# Patient Record
Sex: FEMALE | Race: WHITE | Employment: OTHER | URBAN - METROPOLITAN AREA
[De-identification: names, ages, dates, MRNs, and addresses within clinical notes are randomized per-mention and may not be internally consistent; named-entity substitution may affect disease eponyms.]

---

## 2017-01-14 ENCOUNTER — HOSPITAL ENCOUNTER (OUTPATIENT)
Dept: MAMMOGRAPHY | Age: 67
Discharge: HOME | End: 2017-01-14
Attending: FAMILY MEDICINE
Payer: MEDICARE

## 2017-01-14 DIAGNOSIS — Z12.31 ENCOUNTER FOR SCREENING MAMMOGRAM FOR MALIGNANT NEOPLASM OF BREAST: ICD-10-CM

## 2017-01-14 PROCEDURE — 77067 SCR MAMMO BI INCL CAD: CPT

## 2017-01-25 ENCOUNTER — HOSPITAL ENCOUNTER (OUTPATIENT)
Dept: MAMMOGRAPHY | Age: 67
Discharge: HOME | End: 2017-01-25
Attending: FAMILY MEDICINE
Payer: MEDICARE

## 2017-01-25 DIAGNOSIS — R92.8 ABNORMAL SCREENING MAMMOGRAM: ICD-10-CM

## 2017-01-25 PROCEDURE — 76642 ULTRASOUND BREAST LIMITED: CPT

## 2017-01-25 PROCEDURE — 77065 DX MAMMO INCL CAD UNI: CPT

## 2017-04-26 ENCOUNTER — APPOINTMENT (OUTPATIENT)
Dept: GENERAL RADIOLOGY | Age: 67
End: 2017-04-26
Attending: EMERGENCY MEDICINE
Payer: MEDICARE

## 2017-04-26 ENCOUNTER — HOSPITAL ENCOUNTER (EMERGENCY)
Age: 67
Discharge: HOME | End: 2017-04-26
Attending: EMERGENCY MEDICINE
Payer: MEDICARE

## 2017-04-26 VITALS
TEMPERATURE: 98 F | WEIGHT: 130 LBS | RESPIRATION RATE: 15 BRPM | OXYGEN SATURATION: 96 % | BODY MASS INDEX: 20.4 KG/M2 | HEART RATE: 70 BPM | HEIGHT: 67 IN | SYSTOLIC BLOOD PRESSURE: 128 MMHG | DIASTOLIC BLOOD PRESSURE: 72 MMHG

## 2017-04-26 DIAGNOSIS — R07.89 ATYPICAL CHEST PAIN: Primary | ICD-10-CM

## 2017-04-26 LAB
ALBUMIN SERPL BCP-MCNC: 3.5 G/DL (ref 3.2–4.6)
ALBUMIN/GLOB SERPL: 0.8 {RATIO} (ref 1.2–3.5)
ALP SERPL-CCNC: 82 U/L (ref 50–136)
ALT SERPL-CCNC: 24 U/L (ref 12–65)
ANION GAP BLD CALC-SCNC: 10 MMOL/L (ref 7–16)
AST SERPL W P-5'-P-CCNC: 18 U/L (ref 15–37)
ATRIAL RATE: 68 BPM
ATRIAL RATE: 88 BPM
BILIRUB SERPL-MCNC: 0.5 MG/DL (ref 0.2–1.1)
BUN SERPL-MCNC: 8 MG/DL (ref 8–23)
CALCIUM SERPL-MCNC: 8.6 MG/DL (ref 8.3–10.4)
CALCULATED P AXIS, ECG09: 73 DEGREES
CALCULATED P AXIS, ECG09: 91 DEGREES
CALCULATED R AXIS, ECG10: 78 DEGREES
CALCULATED R AXIS, ECG10: 79 DEGREES
CALCULATED T AXIS, ECG11: -3 DEGREES
CALCULATED T AXIS, ECG11: 8 DEGREES
CHLORIDE SERPL-SCNC: 102 MMOL/L (ref 98–107)
CO2 SERPL-SCNC: 28 MMOL/L (ref 21–32)
CREAT SERPL-MCNC: 0.77 MG/DL (ref 0.6–1)
DIAGNOSIS, 93000: NORMAL
DIAGNOSIS, 93000: NORMAL
ERYTHROCYTE [DISTWIDTH] IN BLOOD BY AUTOMATED COUNT: 14.5 % (ref 11.9–14.6)
GLOBULIN SER CALC-MCNC: 4.5 G/DL (ref 2.3–3.5)
GLUCOSE SERPL-MCNC: 130 MG/DL (ref 65–100)
HCT VFR BLD AUTO: 41.9 % (ref 35.8–46.3)
HGB BLD-MCNC: 14 G/DL (ref 11.7–15.4)
MCH RBC QN AUTO: 29.7 PG (ref 26.1–32.9)
MCHC RBC AUTO-ENTMCNC: 33.4 G/DL (ref 31.4–35)
MCV RBC AUTO: 88.8 FL (ref 79.6–97.8)
P-R INTERVAL, ECG05: 156 MS
P-R INTERVAL, ECG05: 160 MS
PLATELET # BLD AUTO: 269 K/UL (ref 150–450)
PMV BLD AUTO: 10.4 FL (ref 10.8–14.1)
POTASSIUM SERPL-SCNC: 3.5 MMOL/L (ref 3.5–5.1)
PROT SERPL-MCNC: 8 G/DL (ref 6.3–8.2)
Q-T INTERVAL, ECG07: 334 MS
Q-T INTERVAL, ECG07: 386 MS
QRS DURATION, ECG06: 78 MS
QRS DURATION, ECG06: 82 MS
QTC CALCULATION (BEZET), ECG08: 404 MS
QTC CALCULATION (BEZET), ECG08: 410 MS
RBC # BLD AUTO: 4.72 M/UL (ref 4.05–5.25)
SODIUM SERPL-SCNC: 140 MMOL/L (ref 136–145)
TROPONIN I BLD-MCNC: 0 NG/ML (ref 0–0.08)
TROPONIN I BLD-MCNC: 0 NG/ML (ref 0–0.08)
VENTRICULAR RATE, ECG03: 68 BPM
VENTRICULAR RATE, ECG03: 88 BPM
WBC # BLD AUTO: 6.8 K/UL (ref 4.3–11.1)

## 2017-04-26 PROCEDURE — 71010 XR CHEST PORT: CPT

## 2017-04-26 PROCEDURE — 99285 EMERGENCY DEPT VISIT HI MDM: CPT | Performed by: EMERGENCY MEDICINE

## 2017-04-26 PROCEDURE — 84484 ASSAY OF TROPONIN QUANT: CPT

## 2017-04-26 PROCEDURE — 74011250637 HC RX REV CODE- 250/637: Performed by: EMERGENCY MEDICINE

## 2017-04-26 PROCEDURE — 93005 ELECTROCARDIOGRAM TRACING: CPT | Performed by: EMERGENCY MEDICINE

## 2017-04-26 PROCEDURE — 85027 COMPLETE CBC AUTOMATED: CPT | Performed by: EMERGENCY MEDICINE

## 2017-04-26 PROCEDURE — 80053 COMPREHEN METABOLIC PANEL: CPT | Performed by: EMERGENCY MEDICINE

## 2017-04-26 RX ORDER — PREDNISONE 20 MG/1
40 TABLET ORAL DAILY
Qty: 8 TAB | Refills: 0 | Status: SHIPPED | OUTPATIENT
Start: 2017-04-26 | End: 2017-04-30

## 2017-04-26 RX ORDER — NITROGLYCERIN 0.4 MG/1
0.4 TABLET SUBLINGUAL
Status: DISCONTINUED | OUTPATIENT
Start: 2017-04-26 | End: 2017-04-26 | Stop reason: HOSPADM

## 2017-04-26 RX ORDER — GUAIFENESIN 100 MG/5ML
324 LIQUID (ML) ORAL
Status: COMPLETED | OUTPATIENT
Start: 2017-04-26 | End: 2017-04-26

## 2017-04-26 RX ADMIN — NITROGLYCERIN 0.4 MG: 0.4 TABLET SUBLINGUAL at 10:10

## 2017-04-26 RX ADMIN — ASPIRIN 81 MG 324 MG: 81 TABLET ORAL at 10:10

## 2017-04-26 NOTE — DISCHARGE INSTRUCTIONS
Chest Pain: Care Instructions  Your Care Instructions  There are many things that can cause chest pain. Some are not serious and will get better on their own in a few days. But some kinds of chest pain need more testing and treatment. Your doctor may have recommended a follow-up visit in the next 8 to 12 hours. If you are not getting better, you may need more tests or treatment. Even though your doctor has released you, you still need to watch for any problems. The doctor carefully checked you, but sometimes problems can develop later. If you have new symptoms or if your symptoms do not get better, get medical care right away. If you have worse or different chest pain or pressure that lasts more than 5 minutes or you passed out (lost consciousness), call 911 or seek other emergency help right away. A medical visit is only one step in your treatment. Even if you feel better, you still need to do what your doctor recommends, such as going to all suggested follow-up appointments and taking medicines exactly as directed. This will help you recover and help prevent future problems. How can you care for yourself at home? · Rest until you feel better. · Take your medicine exactly as prescribed. Call your doctor if you think you are having a problem with your medicine. · Do not drive after taking a prescription pain medicine. When should you call for help? Call 911 if:  · You passed out (lost consciousness). · You have severe difficulty breathing. · You have symptoms of a heart attack. These may include:  ¨ Chest pain or pressure, or a strange feeling in your chest.  ¨ Sweating. ¨ Shortness of breath. ¨ Nausea or vomiting. ¨ Pain, pressure, or a strange feeling in your back, neck, jaw, or upper belly or in one or both shoulders or arms. ¨ Lightheadedness or sudden weakness. ¨ A fast or irregular heartbeat.   After you call 911, the  may tell you to chew 1 adult-strength or 2 to 4 low-dose aspirin. Wait for an ambulance. Do not try to drive yourself. Call your doctor today if:  · You have any trouble breathing. · Your chest pain gets worse. · You are dizzy or lightheaded, or you feel like you may faint. · You are not getting better as expected. · You are having new or different chest pain. Where can you learn more? Go to http://tae-edel.info/. Enter A120 in the search box to learn more about \"Chest Pain: Care Instructions. \"  Current as of: May 27, 2016  Content Version: 11.2  © 2719-7335 [employer]. Care instructions adapted under license by [employer] (which disclaims liability or warranty for this information). If you have questions about a medical condition or this instruction, always ask your healthcare professional. [address] any warranty or liability for your use of this information. Pleurisy: Care Instructions  Your Care Instructions  Pleurisy is inflammation of the tissue that lines the inside of the chest and covers the lungs (pleura). Pleurisy is often caused by an infection, usually a virus. It also can be caused by other health problems, such as pneumonia or lupus. Pleurisy can cause sharp chest pain that gets worse when you cough or take a deep breath. You may need more tests to find out what is causing your pleurisy. Treatment depends on the cause. Pleurisy may come and go for a few days, or it may continue if the cause has not been treated. Home treatment can help ease symptoms. Follow-up care is a key part of your treatment and safety. Be sure to make and go to all appointments, and call your doctor if you are having problems. Its also a good idea to know your test results and keep a list of the medicines you take. How can you care for yourself at home? · Take an over-the-counter pain medicine, such as acetaminophen (Tylenol), ibuprofen (Advil, Motrin), or naproxen (Aleve).  Read and follow all instructions on the label. · Do not take two or more pain medicines at the same time unless the doctor told you to. Many pain medicines have acetaminophen, which is Tylenol. Too much acetaminophen (Tylenol) can be harmful. · If your doctor prescribed antibiotics, take them as directed. Do not stop taking them just because you feel better. You need to take the full course of antibiotics. · Take cough medicine as directed if your doctor recommends it. · Avoid activities that make the pain worse. When should you call for help? Call 911 anytime you think you may need emergency care. For example, call if:  · You have severe trouble breathing. · You have severe chest pain. · You passed out (lost consciousness). Call your doctor now or seek immediate medical care if:  · You have a new or higher fever. Watch closely for changes in your health, and be sure to contact your doctor if:  · You begin to cough up yellow or green mucus. · You cough up blood. · Your symptoms are not better in 3 or 4 days. Where can you learn more? Go to http://tae-edel.info/. Enter F346 in the search box to learn more about \"Pleurisy: Care Instructions. \"  Current as of: May 23, 2016  Content Version: 11.2  © 1258-1062 [employer]. Care instructions adapted under license by [employer] (which disclaims liability or warranty for this information). If you have questions about a medical condition or this instruction, always ask your healthcare professional. [place] [address] any warranty or liability for your use of this information.     Recent Results (from the past 8 hour(s))   POC TROPONIN-I    Collection Time: 04/26/17  9:15 AM   Result Value Ref Range    Troponin-I (POC) 0 0.0 - 0.08 ng/ml   CBC W/O DIFF    Collection Time: 04/26/17  9:18 AM   Result Value Ref Range    WBC 6.8 4.3 - 11.1 K/uL    RBC 4.72 4.05 - 5.25 M/uL    HGB 14.0 11.7 - 15.4 g/dL    HCT 41.9 35.8 - 46.3 %    MCV 88.8 79.6 - 97.8 FL    MCH 29.7 26.1 - 32.9 PG    MCHC 33.4 31.4 - 35.0 g/dL    RDW 14.5 11.9 - 14.6 %    PLATELET 034 762 - 733 K/uL    MPV 10.4 (L) 10.8 - 57.6 FL   METABOLIC PANEL, COMPREHENSIVE    Collection Time: 04/26/17  9:18 AM   Result Value Ref Range    Sodium 140 136 - 145 mmol/L    Potassium 3.5 3.5 - 5.1 mmol/L    Chloride 102 98 - 107 mmol/L    CO2 28 21 - 32 mmol/L    Anion gap 10 7 - 16 mmol/L    Glucose 130 (H) 65 - 100 mg/dL    BUN 8 8 - 23 MG/DL    Creatinine 0.77 0.6 - 1.0 MG/DL    GFR est AA >60 >60 ml/min/1.73m2    GFR est non-AA >60 >60 ml/min/1.73m2    Calcium 8.6 8.3 - 10.4 MG/DL    Bilirubin, total 0.5 0.2 - 1.1 MG/DL    ALT (SGPT) 24 12 - 65 U/L    AST (SGOT) 18 15 - 37 U/L    Alk. phosphatase 82 50 - 136 U/L    Protein, total 8.0 6.3 - 8.2 g/dL    Albumin 3.5 3.2 - 4.6 g/dL    Globulin 4.5 (H) 2.3 - 3.5 g/dL    A-G Ratio 0.8 (L) 1.2 - 3.5     EKG, 12 LEAD, INITIAL    Collection Time: 04/26/17  9:18 AM   Result Value Ref Range    Ventricular Rate 88 BPM    Atrial Rate 88 BPM    P-R Interval 156 ms    QRS Duration 78 ms    Q-T Interval 334 ms    QTC Calculation (Bezet) 404 ms    Calculated P Axis 91 degrees    Calculated R Axis 79 degrees    Calculated T Axis 8 degrees    Diagnosis       !! AGE AND GENDER SPECIFIC ECG ANALYSIS !!   Normal sinus rhythm  Biatrial enlargement  Cannot rule out Anterior infarct (cited on or before 19-AUG-2013)  ST & T wave abnormality, consider inferior ischemia  Abnormal ECG  When compared with ECG of 27-DEC-2013 03:59,  Premature supraventricular complexes are no longer Present  T wave inversion more evident in Anterior leads     POC TROPONIN-I    Collection Time: 04/26/17 10:55 AM   Result Value Ref Range    Troponin-I (POC) 0 0.0 - 0.08 ng/ml

## 2017-04-26 NOTE — ED PROVIDER NOTES
HPI     CDNotesTM Templates                            Emergency Department     Chief Complaint:  Chest pain  HPI:  41-year-old female with history of coronary disease status post stents presents to the emergency department with substernal chest pain. Patient states it feels like a goose egg in her chest.  Worse with deep breath. She also complains of pain in her jaws. She also reports that her left arm \"feels funny\"  Chest pain started yesterday, worse this morning  The location of the pain is substernal  Pain is described as a dull pain like something sitting in her chest  Severity of pain is moderate  Timing of onset of symptoms was gradual  Associated symptoms include none  Aggravating factors include none  Alleviating factors include none  Historian:     Review of Systems:  Include pertinent positives and negatives.     CONST:  Denies: fever, chills  ENT:  Denies: sore throat, nasal congestion  EYES:  Denies: vision changes  CARDIO: Chest pain, no palpitations  RESP:  No shortness of breath, no cough,  GI:  Denies: abdominal pain, nausea, vomiting, diarrhea  :  Denies: urinary symptoms  MUSC: Denies: muscle pain, joint pain  SKIN:  Denies: rash  NEURO: Denies: headache, weakness  Past Medical History:  Past Medical History:   Diagnosis Date    CAD (coronary artery disease) 2009, 8/19/2013    PCI,  Shabnam Snyder COPD     Dr. Mari Perez GERD (gastroesophageal reflux disease)     [facility] failure ([facility] [address]     Hypertension     STEMI (ST elevation myocardial infarction) ([facility] [address]     Thyroid disease      Past Surgical History:   Procedure Laterality Date    CARDIAC SURG PROCEDURE UNLIST  2009, 8/19/13    LAD    HX GYN      rebuilt cervix    HX TONSIL AND ADENOIDECTOMY       Social History   Substance Use Topics    Smoking status: Former Smoker     Packs/day: 0.25     Years: 45.00     Types: Cigarettes     Quit date: 8/19/2013    Smokeless tobacco: Never Used    Alcohol use 1.8 oz/week     1 Glasses of wine, 1 Cans of beer, 1 Shots of liquor per week      Comment: rare     Family History   Problem Relation Age of Onset    Heart Attack Father      Adoptive father    No Known Problems Mother      adopted     Previous Medications    ASPIRIN DELAYED-RELEASE 81 MG TABLET    Take  by mouth daily. ATORVASTATIN (LIPITOR) 80 MG TABLET        BUDESONIDE-FORMOTEROL (SYMBICORT) 160-4.5 MCG/ACTUATION HFA INHALER    Take 2 Puffs by inhalation two (2) times a day. CARVEDILOL (COREG) 3.125 MG TABLET    Take 1 Tab by mouth two (2) times daily (with meals). CETIRIZINE (ZYRTEC) 10 MG TABLET    Take  by mouth. CLOPIDOGREL (PLAVIX) 75 MG TABLET    Take 1 Tab by mouth daily. GUAIFENESIN ER (MUCINEX) 600 MG ER TABLET    Take 600 mg by mouth two (2) times a day. HYDROCORTISONE BUTYRATE (LOCOID) 0.1 % TOPICAL CREAM    Apply  to affected area two (2) times a day. LISINOPRIL (PRINIVIL, ZESTRIL) 2.5 MG TABLET    Take 1 Tab by mouth daily. MULTIVITAMIN (ONE A DAY) TABLET    Take 1 Tab by mouth daily. NITROGLYCERIN (NITROSTAT) 0.4 MG SL TABLET    1 Tab by SubLINGual route every five (5) minutes as needed for Chest Pain. PANTOPRAZOLE (PROTONIX) 40 MG TABLET    Take 1 Tab by mouth two (2) times a day. PROAIR HFA 90 MCG/ACTUATION INHALER    USE 2 PUFFS BY MOUTH EVERY 4 HOURS AS NEEDED FOR WHEEZING    TIOTROPIUM (SPIRIVA WITH HANDIHALER) 18 MCG INHALATION CAPSULE    Take 1 Cap by inhalation daily. Allergies as of 04/26/2017    (No Known Allergies)       Physical Exam:    Vital signs:     Vital signs were reviewed.     Pulse oximetry interpretation: 96% normal    General Appear: alert, no distress  Ears/Nose/Throat: mucous membranes moist, pharynx clear  Eyes:   PERRL, anicteric  Neck:   supple, FROM, no JVD  Cardiovascular: Regular without murmurs  Respiratory:  No wheezing rales or rhonchi  Abdomen:  soft, non-tender, non-distended, normo-active bowel sounds  Musculoskeletal: no edema  Skin:   dry, no rash  Neurologic:  alert, oriented, tongue and facial movements intact  _______________________________________________________________________    LABS/RADIOLOGY/EKG:    Labs:     Results for orders placed or performed during the hospital encounter of 04/26/17   CBC W/O DIFF   Result Value Ref Range    WBC 6.8 4.3 - 11.1 K/uL    RBC 4.72 4.05 - 5.25 M/uL    HGB 14.0 11.7 - 15.4 g/dL    HCT 41.9 35.8 - 46.3 %    MCV 88.8 79.6 - 97.8 FL    MCH 29.7 26.1 - 32.9 PG    MCHC 33.4 31.4 - 35.0 g/dL    RDW 14.5 11.9 - 14.6 %    PLATELET 681 942 - 727 K/uL    MPV 10.4 (L) 10.8 - 78.6 FL   METABOLIC PANEL, COMPREHENSIVE   Result Value Ref Range    Sodium 140 136 - 145 mmol/L    Potassium 3.5 3.5 - 5.1 mmol/L    Chloride 102 98 - 107 mmol/L    CO2 28 21 - 32 mmol/L    Anion gap 10 7 - 16 mmol/L    Glucose 130 (H) 65 - 100 mg/dL    BUN 8 8 - 23 MG/DL    Creatinine 0.77 0.6 - 1.0 MG/DL    GFR est AA >60 >60 ml/min/1.73m2    GFR est non-AA >60 >60 ml/min/1.73m2    Calcium 8.6 8.3 - 10.4 MG/DL    Bilirubin, total 0.5 0.2 - 1.1 MG/DL    ALT (SGPT) 24 12 - 65 U/L    AST (SGOT) 18 15 - 37 U/L    Alk. phosphatase 82 50 - 136 U/L    Protein, total 8.0 6.3 - 8.2 g/dL    Albumin 3.5 3.2 - 4.6 g/dL    Globulin 4.5 (H) 2.3 - 3.5 g/dL    A-G Ratio 0.8 (L) 1.2 - 3.5     POC TROPONIN-I   Result Value Ref Range    Troponin-I (POC) 0 0.0 - 0.08 ng/ml   EKG, 12 LEAD, INITIAL   Result Value Ref Range    Ventricular Rate 88 BPM    Atrial Rate 88 BPM    P-R Interval 156 ms    QRS Duration 78 ms    Q-T Interval 334 ms    QTC Calculation (Bezet) 404 ms    Calculated P Axis 91 degrees    Calculated R Axis 79 degrees    Calculated T Axis 8 degrees    Diagnosis       !! AGE AND GENDER SPECIFIC ECG ANALYSIS !!   Normal sinus rhythm  Biatrial enlargement  Cannot rule out Anterior infarct (cited on or before 19-AUG-2013)  ST & T wave abnormality, consider inferior ischemia  Abnormal ECG  When compared with ECG of 27-DEC-2013 03:59,  Premature supraventricular complexes are no longer Present  T wave inversion more evident in Anterior leads       Initial troponin 0    Labs were reviewed and interpreted by me. Radiology studies performed:    XR CHEST PORT   Final Result   IMPRESSION:    1. No acute cardiopulmonary process evident on single frontal view of the   chest.                 EKG interpretation:    Sinus rhythm, no ST or T-wave changes  _______________________________________________________________  Assessment/Plan:    89-year-old female with substernal chest pain. Differential includes pleurisy, pericarditis, pneumonia, coronary disease  ________________________________________________________________________  Progress:    Patient resting comfortably. Blood pressure came down from 180-125/71. She appears much more comfortable. She feels better. EKG at arrival and at 2 hours were unchanged. Troponins at arrival and 2 hours were 0. I do believe she is at some risk though I don't think this is an acute coronary syndrome. We'll refer her to stay cardiology for close follow-up. Aspirin: given    Nursing notes were reviewed: reviewed  [facility] cardiology referral number called and message left  _______________________________________________________________________    Condition:  improved  Disposition:  home  Diagnosis:  Atypical chest pain      Nasim Muniz M.D.      Noy Barahona; version 2.0; revised April, 2016. Review of Systems    There were no vitals filed for this visit.          Physical Exam     MDM  ED Course       Procedures

## 2017-04-27 ENCOUNTER — PATIENT OUTREACH (OUTPATIENT)
Dept: CASE MANAGEMENT | Age: 67
End: 2017-04-27

## 2017-04-27 PROBLEM — F41.9 CHRONIC ANXIETY: Status: ACTIVE | Noted: 2017-04-27

## 2017-04-28 NOTE — PROGRESS NOTES
Date/Time of Call:   04/27/2017 1:23 PM   What was the patient seen in the ED for? Patient was seen in ED for diagnosis of: Atypical chest pain   Does the patient understand his/her diagnosis and/or treatment and what happened during the ED visit? Spoke with patient who stated understanding of treatment and diagnosis. Patient states she is doing better. Did the patient receive discharge instructions from the ED? Patient stated discharge instructions were received from the ED. Review any discharge instructions (see notes in [facility]). Ask patient if they understand these. Do they have any questions? Patient and Care coordinator reviewed DC instructions. Patient stated understanding and no questions asked. Were home services ordered (nursing, PT, OT, ST, etc.)? No HH services ordered at D/C. If so, has the first visit occurred? If not, why? (Assist with coordination of services if necessary.) N/A   Was any DME ordered? No DME ordered at d/c. If so, has it been received? If not, why?  (Assist with coordination of arranging DME orders if necessary.) N/A   Complete a review of all medications (new, continued and discontinued meds per the D/C instructions and medication tab in [facility]). Review of medications has been completed. Prednisone prescribed at d/c. Were all new prescriptions filled? If not, why?  (Assist with obtainment of medications if necessary.) Yes. Does the patient understand the purpose and dosing instructions for all medications? (If patient has questions, provide explanation and education.) Patient stated understanding and purpose for instructions for medications. Does the patient have any problems in performing ADLs? (If patient is unable to perform ADLs  what is the limiting factor(s)?   Do they have a support system that can assist? If no support system is present, discuss possible assistance that they may be able to obtain.) Patient states she is independent with all ADLs. Does the patient have all follow-up appointments scheduled? Has transportation been arranged? [facility] follow-up should be within 7 days of discharge; all others should have PCP follow-up within 7 days of discharge; follow-ups with other specialists as appropriate or ordered.) Patient was advised, educated, and encouraged to schedule f/u appt. within 7 days of d/c. Patient declined assistance in scheduling states she will call to do so today. Patient states she will have transportation. Cardiology appt. Is 5/9/17. Any other questions or concerns expressed by the patient? No further questions asked or needs identified at this time. Engagement call #1 scheduled for 5/2/17. Will f/u with appt. scheduling at that time. JAVAN Call Completed By: Marilyn Sherman LPN   Care Coordinator  [facility]          This note will not be viewable in [address]

## 2017-05-02 ENCOUNTER — PATIENT OUTREACH (OUTPATIENT)
Dept: CASE MANAGEMENT | Age: 67
End: 2017-05-02

## 2017-05-02 NOTE — PROGRESS NOTES
Attempted call to patient no answer left vmail requesting return call for ff/u #1. Scheduled f/u #2 for  5/3/17. María Osorio LPN/ Care Coordinator  [address] [place] [address] / [place], [address] [facility]  [email or web address]. [facility] note will not be viewable in [address]

## 2017-05-03 ENCOUNTER — PATIENT OUTREACH (OUTPATIENT)
Dept: CASE MANAGEMENT | Age: 67
End: 2017-05-03

## 2017-05-03 NOTE — PROGRESS NOTES
Spoke to patient for f/u 2. Again advised, encouraged, and educated patient on importance of f/u appt. Patient stated understanding and gratitude for call. Will close case. María Osorio LPN/ Care Coordinator  [address], [address] / [place], [address]  [email or web address]. [facility] note will not be viewable in [address]

## 2017-05-09 PROBLEM — I49.3 VENTRICULAR ECTOPY: Status: ACTIVE | Noted: 2017-05-09

## 2018-09-19 ENCOUNTER — HOSPITAL ENCOUNTER (OUTPATIENT)
Dept: MAMMOGRAPHY | Age: 68
Discharge: HOME | End: 2018-09-19
Attending: FAMILY MEDICINE
Payer: MEDICARE

## 2018-09-19 DIAGNOSIS — Z12.39 SCREENING FOR BREAST CANCER: ICD-10-CM

## 2018-09-19 PROCEDURE — 77067 SCR MAMMO BI INCL CAD: CPT

## 2018-12-07 ENCOUNTER — HOSPITAL ENCOUNTER (OUTPATIENT)
Dept: GENERAL RADIOLOGY | Age: 68
Discharge: HOME | End: 2018-12-07
Payer: MEDICARE

## 2018-12-07 DIAGNOSIS — R63.4 WEIGHT LOSS: ICD-10-CM

## 2018-12-07 DIAGNOSIS — R05.9 COUGH: ICD-10-CM

## 2018-12-07 PROCEDURE — 71046 X-RAY EXAM CHEST 2 VIEWS: CPT

## 2019-04-17 ENCOUNTER — HOSPITAL ENCOUNTER (OUTPATIENT)
Dept: GENERAL RADIOLOGY | Age: 69
Discharge: HOME | End: 2019-04-17
Attending: FAMILY MEDICINE
Payer: MEDICARE

## 2019-04-17 DIAGNOSIS — M79.652 LEFT THIGH PAIN: ICD-10-CM

## 2019-04-17 PROCEDURE — 73552 X-RAY EXAM OF FEMUR 2/>: CPT

## 2019-04-20 ENCOUNTER — APPOINTMENT (OUTPATIENT)
Dept: GENERAL RADIOLOGY | Age: 69
End: 2019-04-20
Attending: EMERGENCY MEDICINE
Payer: MEDICARE

## 2019-04-20 ENCOUNTER — HOSPITAL ENCOUNTER (EMERGENCY)
Age: 69
Discharge: HOME | End: 2019-04-20
Attending: EMERGENCY MEDICINE
Payer: MEDICARE

## 2019-04-20 VITALS
HEIGHT: 67 IN | RESPIRATION RATE: 16 BRPM | OXYGEN SATURATION: 95 % | BODY MASS INDEX: 19.46 KG/M2 | DIASTOLIC BLOOD PRESSURE: 80 MMHG | WEIGHT: 124 LBS | TEMPERATURE: 98.2 F | SYSTOLIC BLOOD PRESSURE: 161 MMHG | HEART RATE: 99 BPM

## 2019-04-20 DIAGNOSIS — R09.82 POST-NASAL DRIP: ICD-10-CM

## 2019-04-20 DIAGNOSIS — J44.1 COPD EXACERBATION (HCC): Primary | ICD-10-CM

## 2019-04-20 DIAGNOSIS — S51.812A SKIN TEAR OF FOREARM WITHOUT COMPLICATION, LEFT, INITIAL ENCOUNTER: ICD-10-CM

## 2019-04-20 LAB — DEPRECATED S PYO AG THROAT QL EIA: NEGATIVE

## 2019-04-20 PROCEDURE — 96374 THER/PROPH/DIAG INJ IV PUSH: CPT | Performed by: EMERGENCY MEDICINE

## 2019-04-20 PROCEDURE — 71046 X-RAY EXAM CHEST 2 VIEWS: CPT

## 2019-04-20 PROCEDURE — 94640 AIRWAY INHALATION TREATMENT: CPT

## 2019-04-20 PROCEDURE — 87880 STREP A ASSAY W/OPTIC: CPT

## 2019-04-20 PROCEDURE — 87081 CULTURE SCREEN ONLY: CPT

## 2019-04-20 PROCEDURE — 74011000250 HC RX REV CODE- 250: Performed by: EMERGENCY MEDICINE

## 2019-04-20 PROCEDURE — 74011250636 HC RX REV CODE- 250/636: Performed by: EMERGENCY MEDICINE

## 2019-04-20 PROCEDURE — 99283 EMERGENCY DEPT VISIT LOW MDM: CPT | Performed by: EMERGENCY MEDICINE

## 2019-04-20 PROCEDURE — 94664 DEMO&/EVAL PT USE INHALER: CPT

## 2019-04-20 RX ORDER — PREDNISONE 50 MG/1
50 TABLET ORAL DAILY
Qty: 5 TAB | Refills: 0 | Status: SHIPPED | OUTPATIENT
Start: 2019-04-20 | End: 2019-04-25

## 2019-04-20 RX ORDER — MONTELUKAST SODIUM 10 MG/1
10 TABLET ORAL DAILY
Qty: 30 TAB | Refills: 0 | Status: SHIPPED | OUTPATIENT
Start: 2019-04-20 | End: 2019-05-06 | Stop reason: SDUPTHER

## 2019-04-20 RX ORDER — IPRATROPIUM BROMIDE AND ALBUTEROL SULFATE 2.5; .5 MG/3ML; MG/3ML
3 SOLUTION RESPIRATORY (INHALATION)
Status: COMPLETED | OUTPATIENT
Start: 2019-04-20 | End: 2019-04-20

## 2019-04-20 RX ADMIN — IPRATROPIUM BROMIDE AND ALBUTEROL SULFATE 3 ML: .5; 3 SOLUTION RESPIRATORY (INHALATION) at 16:01

## 2019-04-20 RX ADMIN — METHYLPREDNISOLONE SODIUM SUCCINATE 125 MG: 125 INJECTION, POWDER, FOR SOLUTION INTRAMUSCULAR; INTRAVENOUS at 15:36

## 2019-04-20 NOTE — ED PROVIDER NOTES
The patient is a 67-year-old female who presents the emergency department today complaining of shortness of breath which has progressively worsened over the last 2-3 days. Patient does have a history of allergic rhinitis with weather change. She has a history of COPD but is not currently on oxygen at home and does not have any nebulizers. She does use a rescue inhaler on an as-needed basis. The patient notes that she also started having a sore throat 2-3 days ago as well. It is painful to swallow. Patient denied any esophageal obstructions. The patient has not been having any fevers or chills. She is also waiting of an abrasion to the left forearm. Patient states any kind of trauma causes significant skin tear because of her Plavix. She said that on Thursday her dog scratched her trying to get her attention and she put a bandage over it that time and has not taken it off since. The patient is concerned that the wound may be getting infected. Past Medical History:  
Diagnosis Date  CAD (coronary artery disease) 2009, 8/19/2013 PCI,  [facility]  Chronic anxiety 4/27/2017  COPD Dr. Eduard Rodrigez  GERD (gastroesophageal reflux disease)  Heart failure ([facility]  Hypertension  Thyroid disease Past Surgical History:  
Procedure Laterality Date  CARDIAC SURG PROCEDURE UNLIST  2009, 8/19/13 LAD  HX GYN    
 rebuilt cervix  HX TONSIL AND ADENOIDECTOMY Family History:  
Problem Relation Age of Onset  Heart Attack Father Adoptive father  No Known Problems Mother   
     adopted Social History Socioeconomic History  Marital status:  Spouse name: Not on file  Number of children: Not on file  Years of education: Not on file  Highest education level: Not on file Occupational History  Occupation: [employer] Employer: SELF EMPLOYED Comment: home Social Needs  Financial resource strain: Not on file  Food insecurity:  
  Worry: Not on file Inability: Not on file  Transportation needs:  
  Medical: Not on file Non-medical: Not on file Tobacco Use  Smoking status: Former Smoker Packs/day: 0.25 Years: 45.00 Pack years: 11.25 Types: Cigarettes Last attempt to quit: 2013 Years since quittin.6  Smokeless tobacco: Never Used Substance and Sexual Activity  Alcohol use: Yes Alcohol/week: 1.8 oz Types: 1 Glasses of wine, 1 Cans of beer, 1 Shots of liquor per week Comment: rare  Drug use: No  
 Sexual activity: Not on file Lifestyle  Physical activity:  
  Days per week: Not on file Minutes per session: Not on file  Stress: Not on file Relationships  Social connections:  
  Talks on phone: Not on file Gets together: Not on file Attends [employer] service: Not on file Active member of club or organization: Not on file Attends meetings of clubs or organizations: Not on file Relationship status: Not on file  Intimate partner violence:  
  Fear of current or ex partner: Not on file Emotionally abused: Not on file Physically abused: Not on file Forced sexual activity: Not on file Other Topics Concern  Not on file Social History Narrative , has 2 children, 1 step child. 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 ALLERGIES: Patient has no known allergies. Review of Systems All other systems reviewed and are negative. Vitals:  
 19 1520 19 1558 BP: 158/80 Pulse: 100 Resp: 22 Temp: 98 °F (36.7 °C) SpO2: 91% 93% Weight: 56.2 kg (124 lb) Height: 5' 7\" (1.702 m) Physical Exam  
 
GENERAL:The patient is thin, and well-hydrated. VITAL SIGNS: Heart rate, blood pressure, respiratory rate reviewed as recorded in 
nurse's notes EYES: Pupils reactive. Extraocular motion intact.  No conjunctival redness or drainage. EARS: No external masses or lesions. Tympanic membranes are clear with no erythema or external auditory canal irritation. NOSE: clear nasal drainage appreciated without obstruction or epistaxis noted. MOUTH/THROAT: cobblestoning noted in the posterior pharynx without any enlargement of the tonsils. Uvula is midline and there are no exudates appreciated. For the mouth is soft. NECK: Supple, no meningeal signs. Trachea midline. Mild lymphadenopathy in the right submandibular region. LUNGS: Breath sounds clear and equal bilaterally no accessory muscle use CHEST: No deformity CARDIOVASCULAR: Regular rate and rhythm EXTREMITIES: patient is a skin tear to the dorsal aspect of the left forearm. It is certainly by some Bruising but no signs of cellulitis or erythema appreciated. NEUROLOGIC: Sensation is grossly intact. Cranial nerve exam reveals face is 
symmetrical, tongue is midline speech is clear. SKIN: No rash or petechiae. Good skin turgor palpated. PSYCHIATRIC: Alert and oriented. Appropriate behavior and judgment. MDM Number of Diagnoses or Management Options Diagnosis management comments: MEDICAL Skin tear, cellulitis, DVT, Thrombophlebitis, venous stasis, varicose veins, peripheral vascular disease, peripheral edema, peripheral arterial disease, arterial occlusion, peripheral neuropathy. .. TRAUMA Sprain, strain, tendon injury, contusion, Abrasion, laceration, neurovascular injury, foreign body Fracture, open fracture, dislocation, joint separation, articular surface injury, 
 
acute exacerbation asthma, COPD, CHF, Bronchitis, aspiration pneumonia, pneumonia, 
 
PE, rib fracture, rib dysfunction, pleurisy, pneumothorax, aspiration of foreign body Amount and/or Complexity of Data Reviewed Clinical lab tests: ordered and reviewed Tests in the radiology section of CPT®: ordered and reviewed Tests in the medicine section of CPT®: ordered and reviewed Obtain history from someone other than the patient: yes Review and summarize past medical records: yes Independent visualization of images, tracings, or specimens: yes ED Course as of Apr 20 1724 Sat Apr 20, 2019  
1605 IMPRESSION: 
No acute cardiopulmonary process. XR CHEST PA LAT [KH] 0224 The patient is feeling much better after the breathing treatment was given her in the emergency department. She does not have a nebulizer at home. I encouraged her to talk to her family doctor about possible use of a nebulizer if her symptoms persist.  She does have the Symbicort inhaler at home which I also encouraged her to start using up to 4 times a day as needed. I also encouraged her to use the Zyrtec daily for the next 7-10 days. Errol Kim 1722 Constipation with findings in the emergency department and wound care instructions. She was given additional supplies to change her dressing. Errol Kim ED Course User Index [KH] Marvel Hatfield, DO  
 
 
Procedures

## 2019-04-20 NOTE — ED NOTES
I have reviewed discharge instructions with the patient. The patient verbalized understanding. Patient left ED via Discharge Method: ambulatory to Home with family. Opportunity for questions and clarification provided. Patient given 2 scripts. To continue your aftercare when you leave the hospital, you may receive an automated call from our care team to check in on how you are doing. This is a free service and part of our promise to provide the best care and service to meet your aftercare needs.  If you have questions, or wish to unsubscribe from this service please call [phone]. Thank you for Choosing our [facility] Emergency Department.

## 2019-04-20 NOTE — ED TRIAGE NOTES
Pt presents to the ED with multiple complaints,  Reports SOB over the last week with sore throat,  Reports hx of COPD, also reports her own dog scratched her x 3 days ago on L arm,  Large abrasion noted.

## 2019-04-20 NOTE — DISCHARGE INSTRUCTIONS
Use your rescue inhaler as needed. Start taking Singulair one daily along with Zyrtec to see if this helps improve symptoms. Change your dressing every 24 hours and keep the area clean with soap and water.

## 2019-04-23 LAB
BACTERIA SPEC CULT: NORMAL
SERVICE CMNT-IMP: NORMAL

## 2019-05-01 ENCOUNTER — HOSPITAL ENCOUNTER (OUTPATIENT)
Dept: PHYSICAL THERAPY | Age: 69
Discharge: HOME | End: 2019-05-01
Attending: FAMILY MEDICINE
Payer: MEDICARE

## 2019-05-01 DIAGNOSIS — M79.652 LEFT THIGH PAIN: ICD-10-CM

## 2019-05-01 PROCEDURE — 97161 PT EVAL LOW COMPLEX 20 MIN: CPT

## 2019-05-01 PROCEDURE — 97110 THERAPEUTIC EXERCISES: CPT

## 2019-05-01 NOTE — THERAPY EVALUATION
Phil Rutherford  : 1950  [place] / [place] / Product Type: Medicare /  [id number] at [address] [facility]. [address] [place] [address], [facility], [address] [place] [address]  [phone]   [phone]         OUTPATIENT PHYSICAL THERAPY:Initial Assessment 2019   ICD-10: Treatment Diagnosis:   Pain in left hip (M25.552)            Precautions/Allergies:   Patient has no known allergies. MD Orders: Eval and Treat. MEDICAL/REFERRING DIAGNOSIS:  Left thigh pain [M79.652]   DATE OF ONSET: 19  REFERRING PHYSICIAN: Norm Pedraza MD  RETURN PHYSICIAN APPOINTMENT: Tmrw. INITIAL ASSESSMENT:  Ms. Pravin Carbajal presents with decreased postural and hip/core strength, ROM, joint mobility, flexibility, functional mobility, and increased pain. These S/S are consistent with *positive linh test .  Symptoms elicited/exacerbated with prone quad stretch and deep squat. She will benefit from hip flexor stretching and glute/core strengthening. Eval was somewhat limited as she was experiencing some chest discomfort. Phil Rutherford will benefit from skilled physical therapy (medically necessary) to address above deficits affecting participation in basic ADLs and functional mobility/tolerance. Patient will benefit from manual therapeutic techniques (stretching, joint mobilizations, soft tissue mobilization/myofascial release), therapeutic exercises and activities, postural strengthening/education, and comprehensive home exercises program to address current impairments and functional limitations. PROBLEM LIST (Impacting functional limitations):  1. Decreased Strength  2. Decreased ADL/Functional Activities  3. Decreased Ambulation Ability/Technique  4. Decreased Balance  5. Increased Pain  6. Decreased Activity Tolerance  7. Increased Fatigue  8. Decreased Flexibility/Joint Mobility  9.  Decreased [place] with Home Exercise Program INTERVENTIONS PLANNED: (Treatment may consist of any combination of the following)  1. Balance Exercise  2. Cold  3. Gait Training  4. Heat  5. Home Exercise Program (HEP)  6. Manual Therapy  7. Neuromuscular Re-education/Strengthening  8. Range of Motion (ROM)  9. Therapeutic Activites  10. Therapeutic Exercise/Strengthening  11. Transcutaneous Electrical Nerve Stimulation (TENS)  12. Ultrasound (US)   TREATMENT PLAN:  Effective Dates: 5/1/2019 TO 7/30/2019 (90 days). Frequency/Duration: 1 time a week for 90 Day(s)  GOALS: (Goals have been discussed and agreed upon with patient.)  Short Term Goals 6 weeks              1. Haley Cortes will be independent with HEP for strength and ROM              2. Haley Cortes will participate in LE strengthening exercises for hip, knee, ankle with weight as appropriate for 3 sets of [address] will report <=0/10 pain with transition from sit to stand and demonstrate minimal to no difficulty           4. Haley Cortes will demonstrate >45 deg on ham90/90 with stretch. 5. Haley Cortes will be able to negotiate stairs without difficulty. 6.           Haley Cortes to increase lower extremity functional scale by 10 points to show improvement in areas of difficulty      OUTCOME MEASURE:   Tool Used: Lower Extremity Functional Scale (LEFS)  Score:  Initial: 58/80 Most Recent: X/80 (Date: -- )   Interpretation of Score: 20 questions each scored on a 5 point scale with 0 representing \"extreme difficulty or unable to perform\" and 4 representing \"no difficulty\". The lower the score, the greater the functional disability. 80/80 represents no disability. Minimal detectable change is 9 points. MEDICAL NECESSITY:   · Skilled intervention continues to be required due to above deficits affecting participation in ADLs and overall QOL.   REASON FOR SERVICES/OTHER COMMENTS:  · Patient continues to require skilled intervention due to patient unable to attend/participate in therapy as expected  · . Total Duration:  PT Patient Time In/Time Out  Time In: 1430  Time Out: 1520    Rehabilitation Potential For Stated Goals: Good  Regarding Anand Simms therapy, I certify that the treatment plan above will be carried out by a therapist or under their direction. Thank you for this referral,  Karl Sofia DPT     Referring Physician Signature: Michaela Suarez MD _______________________________ Date _____________      PAIN/SUBJECTIVE:   Initial:   0/10 Post Session:  0/10   HISTORY:   History of Injury/Illness (Reason for Referral):  *I had thigh pain that started a couple of months ago. The pain hasn't resolved. THe pain isn't there all the time. \"I can be sleeping with legs slightly bent and I go to straighten my legs out and it's like rabago-haw. Getting in and out of the car. I'm not do any stretches at home. I used to be so limber. Past Medical History/Comorbidities:   Ms. Fidencio Bobby  has a past medical history of CAD (coronary artery disease) (2009, 8/19/2013), Chronic anxiety (4/27/2017), COPD, GERD (gastroesophageal reflux disease), Heart failure ([address] Hypertension, and Thyroid disease. Ms. Fidencio Bobby  has a past surgical history that includes pr cardiac surg procedure unlist (2009, 8/19/13); hx gyn; and hx tonsil and adenoidectomy.     Active Ambulatory Problems     Diagnosis Date Noted    CAD (coronary artery disease) 08/19/2013    Tobacco abuse 08/19/2013    History of MI (myocardial infarction) 08/19/2013    Pulmonary hemorrhage 08/20/2013    COPD (chronic obstructive pulmonary disease) ([address] 08/20/2013    Hypoxemia 08/25/2013    Hyperlipidemia 10/10/2016    Essential hypertension, benign 10/10/2016    Coronary artery disease involving native coronary artery of native heart without angina pectoris 10/10/2016    Chronic anxiety 04/27/2017    Ventricular ectopy 05/09/2017     Resolved Ambulatory Problems     Diagnosis Date Noted    [facility]ch[facility] disease) ([facility] 2013    Acute respiratory failure ([facility]     Acute systolic heart failure ([facility] 2013    Chronic obstructive pulmonary disease ([facility] 10/10/2016     Past Medical History:   Diagnosis Date    CAD (coronary artery disease) , 2013    Chronic anxiety 2017    COPD     GERD (gastroesophageal reflux disease)     Heart failure (HCC)     Hypertension     Thyroid disease        Social History/Living Environment:        Social History     Socioeconomic History    Marital status:      Spouse name: Not on file    Number of children: Not on file    Years of education: Not on file    Highest education level: Not on file   Occupational History    Occupation: [employer]     Employer: SELF EMPLOYED     Comment: home   Social Needs    Financial resource strain: Not on file    Food insecurity:     Worry: Not on file     Inability: Not on file    Transportation needs:     Medical: Not on file     Non-medical: Not on file   Tobacco Use    Smoking status: Former Smoker     Packs/day: 0.25     Years: 45.00     Pack years: 11.25     Types: Cigarettes     Last attempt to quit: 2013     Years since quittin.7    Smokeless tobacco: Never Used   Substance and Sexual Activity    Alcohol use:  Yes     Alcohol/week: 1.8 oz     Types: 1 Glasses of wine, 1 Cans of beer, 1 Shots of liquor per week     Comment: rare    Drug use: No    Sexual activity: Not on file   Lifestyle    Physical activity:     Days per week: Not on file     Minutes per session: Not on file    Stress: Not on file   Relationships    Social connections:     Talks on phone: Not on file     Gets together: Not on file     Attends Baptism service: Not on file     Active member of club or organization: Not on file     Attends meetings of clubs or organizations: Not on file     Relationship status: Not on file    Intimate partner violence:     Fear of current or ex partner: Not on file     Emotionally abused: Not on file     Physically abused: Not on file     Forced sexual activity: Not on file   Other Topics Concern    Not on file   Social History Narrative    , has 2 children, 1 step child. 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       Prior Level of Function/Work/Activity:  Limited activity due to COPD and breathing difficulty. Hamstrings limited 45 deg Bilaterally. Personal Factors:          Sex:  female        Age:  70 y.o. Ambulatory/Rehab Services H2 Model Falls Risk Assessment   Risk Factors:       No Risk Factors Identified Ability to Rise from Chair:       (0)  Ability to rise in a single movement   Falls Prevention Plan:       No modifications necessary   Total: (5 or greater = High Risk): 0   ©2010 [facility] [employer]. All Rights Reserved. [facility] Patent #[id number]. Federal Law prohibits the replication, distribution or use without written permission from [facility] [employer]   Current Medications:       Current Outpatient Medications:     montelukast (SINGULAIR) 10 mg tablet, Take 1 Tab by mouth daily. , Disp: 30 Tab, Rfl: 0    carvedilol (COREG) 3.125 mg tablet, TAKE 1 TABLET BY MOUTH TWICE DAILY WITH MEALS, Disp: 180 Tab, Rfl: 12    budesonide-formoterol (SYMBICORT) 160-4.5 mcg/actuation HFAA, Take 2 Puffs by inhalation two (2) times a day., Disp: , Rfl:     nitroglycerin (NITROSTAT) 0.4 mg SL tablet, 1 Tab by SubLINGual route every five (5) minutes as needed for Chest Pain., Disp: 1 Bottle, Rfl: 12    hydrocortisone butyrate (LOCOID) 0.1 % oint, Apply daily as directed, Disp: 45 g, Rfl: 12    tiotropium (SPIRIVA WITH HANDIHALER) 18 mcg inhalation capsule, Take 1 Cap by inhalation daily. , Disp: 90 Cap, Rfl: 3    albuterol (PROVENTIL HFA, VENTOLIN HFA, PROAIR HFA) 90 mcg/actuation inhaler, Take 1 Puff by inhalation every four (4) hours as needed for Wheezing., Disp: 3 Inhaler, Rfl: 3    lisinopril (PRINIVIL, ZESTRIL) 2.5 mg tablet, Take 1 Tab by mouth daily. , Disp: 90 Tab, Rfl: 3    atorvastatin (LIPITOR) 80 mg tablet, Take 1 Tab by mouth daily. , Disp: 90 Tab, Rfl: 3    clopidogrel (PLAVIX) 75 mg tab, Take 1 Tab by mouth daily. , Disp: 90 Tab, Rfl: 3    pantoprazole (PROTONIX) 40 mg tablet, Take 1 Tab by mouth two (2) times a day., Disp: 180 Tab, Rfl: 3    cetirizine (ZYRTEC) 10 mg tablet, Take  by mouth daily as needed. , Disp: , Rfl:     multivitamin (ONE A DAY) tablet, Take 1 Tab by mouth daily. , Disp: , Rfl:     guaiFENesin ER (MUCINEX) 600 mg ER tablet, Take 600 mg by mouth two (2) times daily as needed. , Disp: , Rfl:     aspirin delayed-release 81 mg tablet, Take  by mouth daily. , Disp: , Rfl:    Date Last Reviewed:  5/1/2019    Number of Personal Factors/Comorbidities that affect the Plan of Care: 1-2: MODERATE COMPLEXITY   EXAMINATION:   Observation/Orthostatic Postural Assessment:  No issues  · Gait= Slow gait, but normal  Palpation:  Assessed @ Initial Visit: Tenderness to hip flexor.      Special Tests: Assessed @ Initial Visit   · Michael's Test  · Scouring Test  · KLAUDIA  · SLR    Lower Quarter Screen Eval Date:  Re-Assess Date:  Re-Assess Date:    Active ROM of Lumbar Spine  RIGHT LEFT RIGHT LEFT RIGHT LEFT          Flexion WFL               Extension WFL               Side flexion [facility] WFL              Rotation [facility] WF       Deep Squat (for general function)         Single Leg Stance (Bilateral) or Sidelying Hip Abduction (L5, S1) Normal        Toe Walking (S1) Normal        Heel walking (L4,5) Normal        Resisted Hip Flexion (L1,2) 5/5 5/5       Resisted Knee Extension or Unilateral Sit to Stand (L3,4) 5/5 5/5       Great Toe Extension (L5)         Sensory Examination (EYES SHUT; dermatomes, use cotton ball on bare skin; L2 top of thigh, L3 Medial Knee; L4 Lateral Thigh, L5 Web space of great toe, S1 outside of foot, S2 inside of foot) Normal Normal       Reflex testing (0, 1+, 2+, 3+, 4+)              Patella 2+ 2+            Achilles 2+ 2+                       Neurological Screen: Patient demonstrates/reports no loss in sensation  Functional Mobility:  Affecting participation in ambulation and standing     Balance:    Single Leg Stance: R= >20/ L= >20   Body Structures Involved:  1. Nerves  2. Bones  3. Joints  4. Muscles  5. Ligaments Body Functions Affected:  1. Sensory/Pain  2. Cardio  3. Neuromusculoskeletal  4. Movement Related  5. Skin Related Activities and Participation Affected:  1. Learning and Applying Knowledge  2. General Tasks and Demands  3. Communication  4. Mobility  5.  Self Care   Number of elements (examined above) that affect the Plan of Care: 4+: HIGH COMPLEXITY   CLINICAL PRESENTATION:   Presentation: Stable and uncomplicated: LOW COMPLEXITY   CLINICAL DECISION MAKING:   Use of outcome tool(s) and clinical judgement create a POC that gives a: Clear prediction of patient's progress: LOW COMPLEXITY     Gib Chain, DPT   PT Patient Time In/Time Out  Time In: 1430  Time Out: 1520

## 2019-05-01 NOTE — PROGRESS NOTES
Blanca Huddleston  : 1950  [place] / [place] / Product Type: Medicare /  Maria L Tyrone at [address] [place] [address]. [facility], [address] [facility], [address] [place] [address]  [phone]   [phone]                                                          Mike Natarajan MD      OUTPATIENT PHYSICAL THERAPY: Daily Treatment Note 2019 Visit Count:  1    Pre-treatment Symptoms/Complaints:  L hip flexor tightness   Pain: Initial:   *0/10 Post Session:  0/10   Medications Last Reviewed:  2019   Updated Objective Findings:  Limited hip flexion (hamstring tightness), SOB in session. TREATMENT:   THERAPEUTIC EXERCISE: (25 minutes):  Exercises per grid below to improve mobility, strength and balance. Required minimal visual, verbal and manual cues to promote proper body alignment and promote proper body posture. Progressed resistance and complexity of movement as indicated. Date:  19 Date:   Date:     Activity/Exercise Parameters Parameters Parameters   Prone hip extension 2x10     Prone hip flexion/quad stretch 30\"x3     Hamstring stretch 30\"3                                   MANUAL THERAPY: (- minutes): Joint mobilization and Soft tissue mobilization was utilized and necessary because of the patient's restricted joint motion and restricted motion of soft tissue. [employer]  Treatment/Session Summary:    · Response to Treatment:  No increase in pain or adverse reactions  · Communication/Consultation:  Faxed initial evaluation to MD  · Equipment provided today:  HEP. Recommendations/Intent for next treatment session: Next visit will focus on flexibility and strength. Treatment Plan of Care Effective Dates: 19 TO 2019 (90 days).   Frequency/Duration: 2 times a week for 90 Days  Total Treatment Billable Duration:  25 Rx  PT Patient Time In/Time Out  Time In: 1430  Time Out: Abundio Lara DPT    Future Appointments Date Time Provider Jorgito White   5/2/2019  3:15 PM MD BAY Ross [facility]   5/8/2019  1:00 PM Roger Burger DPT [facility]   5/15/2019 10:45 AM Roger Burger DPT [facility]   5/22/2019 11:00 AM Roger Burger DPT [facility]   5/29/2019  2:30 PM [facility]   9/20/2019  9:00 AM Garry Beyer MD [facility]

## 2019-05-08 ENCOUNTER — APPOINTMENT (OUTPATIENT)
Dept: PHYSICAL THERAPY | Age: 69
End: 2019-05-08
Attending: FAMILY MEDICINE
Payer: MEDICARE

## 2019-05-22 ENCOUNTER — HOSPITAL ENCOUNTER (OUTPATIENT)
Dept: PHYSICAL THERAPY | Age: 69
Discharge: HOME | End: 2019-05-22
Attending: FAMILY MEDICINE
Payer: MEDICARE

## 2019-05-22 NOTE — THERAPY EVALUATION
Chetan Nielson  : 1950  [place] / [place] / Product Type: Medicare /  [id number] at [address] [facility]. [facility] [address] [place] [address], [facility], [address] [place] [address]  [phone]   [phone]         OUTPATIENT PHYSICAL THERAPY:Discharge Summary 2019   ICD-10: Treatment Diagnosis:   Pain in left hip (M25.552)            Precautions/Allergies:   Patient has no known allergies. MD Orders: Eval and Treat. MEDICAL/REFERRING DIAGNOSIS:  No admission diagnoses are documented for this encounter. DATE OF ONSET: 19  REFERRING PHYSICIAN: Jesusita Reynoso MD  RETURN PHYSICIAN APPOINTMENT: Tmrw. Chetan Nielson called office today and asked for case to be DC'd. She only came for 1 visit, . PROBLEM LIST (Impacting functional limitations):  1. Decreased Strength  2. Decreased ADL/Functional Activities  3. Decreased Ambulation Ability/Technique  4. Decreased Balance  5. Increased Pain  6. Decreased Activity Tolerance  7. Increased Fatigue  8. Decreased Flexibility/Joint Mobility  9. Decreased [place] with Home Exercise Program INTERVENTIONS PLANNED: (Treatment may consist of any combination of the following)  1. Balance Exercise  2. Cold  3. Gait Training  4. Heat  5. Home Exercise Program (HEP)  6. Manual Therapy  7. Neuromuscular Re-education/Strengthening  8. Range of Motion (ROM)  9. Therapeutic Activites  10. Therapeutic Exercise/Strengthening  11. Transcutaneous Electrical Nerve Stimulation (TENS)  12. Ultrasound (US)   TREATMENT PLAN:  Effective Dates: 2019 TO 2019 (90 days).   Frequency/Duration: 1 time a week for 90 Day(s)  NOT MET  GOALS: (Goals have been discussed and agreed upon with patient.)  Short Term Goals 6 weeks              1. Chetan Nielson will be independent with HEP for strength and ROM              2. Chetan Nielson will participate in LE strengthening exercises for hip, knee, ankle with weight as appropriate for 3 sets of 10.              3. Stacy Font will report <=0/10 pain with transition from sit to stand and demonstrate minimal to no difficulty           4. Stacy Font will demonstrate >45 deg on ham90/90 with stretch. 5. Stacy Font will be able to negotiate stairs without difficulty. 6.           Stacy Font to increase lower extremity functional scale by 10 points to show improvement in areas of difficulty      OUTCOME MEASURE:   Tool Used: Lower Extremity Functional Scale (LEFS)  Score:  Initial: 58/80 Most Recent: X/80 (Date: -- )   Interpretation of Score: 20 questions each scored on a 5 point scale with 0 representing \"extreme difficulty or unable to perform\" and 4 representing \"no difficulty\". The lower the score, the greater the functional disability. 80/80 represents no disability. Minimal detectable change is 9 points. MEDICAL NECESSITY:   · Skilled intervention continues to be required due to above deficits affecting participation in ADLs and overall QOL. REASON FOR SERVICES/OTHER COMMENTS:  · Patient continues to require skilled intervention due to patient unable to attend/participate in therapy as expected  · . Total Duration:       Rehabilitation Potential For Stated Goals: Good  Regarding Stacy Font therapy, I certify that the treatment plan above will be carried out by a therapist or under their direction.   Thank you for this referral,  Ashish Edouard DPT     Referring Physician Signature:         Ashish Edouard DPT

## 2019-05-29 ENCOUNTER — APPOINTMENT (OUTPATIENT)
Dept: PHYSICAL THERAPY | Age: 69
End: 2019-05-29
Attending: FAMILY MEDICINE
Payer: MEDICARE

## 2019-05-29 ENCOUNTER — HOSPITAL ENCOUNTER (OUTPATIENT)
Dept: LAB | Age: 69
Discharge: HOME | End: 2019-05-29
Payer: MEDICARE

## 2019-05-29 DIAGNOSIS — I25.10 CORONARY ARTERY DISEASE INVOLVING NATIVE CORONARY ARTERY OF NATIVE HEART WITHOUT ANGINA PECTORIS: ICD-10-CM

## 2019-05-29 LAB
ANION GAP SERPL CALC-SCNC: 7 MMOL/L (ref 7–16)
BASOPHILS # BLD: 0 K/UL (ref 0–0.2)
BASOPHILS NFR BLD: 1 % (ref 0–2)
BUN SERPL-MCNC: 12 MG/DL (ref 8–23)
CALCIUM SERPL-MCNC: 9.1 MG/DL (ref 8.3–10.4)
CHLORIDE SERPL-SCNC: 104 MMOL/L (ref 98–107)
CO2 SERPL-SCNC: 27 MMOL/L (ref 21–32)
CREAT SERPL-MCNC: 0.8 MG/DL (ref 0.6–1)
DIFFERENTIAL METHOD BLD: ABNORMAL
EOSINOPHIL # BLD: 0.1 K/UL (ref 0–0.8)
EOSINOPHIL NFR BLD: 2 % (ref 0.5–7.8)
ERYTHROCYTE [DISTWIDTH] IN BLOOD BY AUTOMATED COUNT: 15.5 % (ref 11.9–14.6)
GLUCOSE SERPL-MCNC: 61 MG/DL (ref 65–100)
HCT VFR BLD AUTO: 36.9 % (ref 35.8–46.3)
HGB BLD-MCNC: 12 G/DL (ref 11.7–15.4)
IMM GRANULOCYTES # BLD AUTO: 0 K/UL (ref 0–0.5)
IMM GRANULOCYTES NFR BLD AUTO: 0 % (ref 0–5)
INR PPP: 1.1
LYMPHOCYTES # BLD: 0.8 K/UL (ref 0.5–4.6)
LYMPHOCYTES NFR BLD: 13 % (ref 13–44)
MCH RBC QN AUTO: 29.1 PG (ref 26.1–32.9)
MCHC RBC AUTO-ENTMCNC: 32.5 G/DL (ref 31.4–35)
MCV RBC AUTO: 89.6 FL (ref 79.6–97.8)
MONOCYTES # BLD: 0.6 K/UL (ref 0.1–1.3)
MONOCYTES NFR BLD: 11 % (ref 4–12)
NEUTS SEG # BLD: 4.4 K/UL (ref 1.7–8.2)
NEUTS SEG NFR BLD: 74 % (ref 43–78)
NRBC # BLD: 0 K/UL (ref 0–0.2)
PLATELET # BLD AUTO: 239 K/UL (ref 150–450)
PMV BLD AUTO: 10.2 FL (ref 9.4–12.3)
POTASSIUM SERPL-SCNC: 4.2 MMOL/L (ref 3.5–5.1)
PROTHROMBIN TIME: 13.4 SEC (ref 11.7–14.5)
RBC # BLD AUTO: 4.12 M/UL (ref 4.05–5.2)
SODIUM SERPL-SCNC: 138 MMOL/L (ref 136–145)
WBC # BLD AUTO: 5.9 K/UL (ref 4.3–11.1)

## 2019-05-29 PROCEDURE — 85025 COMPLETE CBC W/AUTO DIFF WBC: CPT

## 2019-05-29 PROCEDURE — 36415 COLL VENOUS BLD VENIPUNCTURE: CPT

## 2019-05-29 PROCEDURE — 80048 BASIC METABOLIC PNL TOTAL CA: CPT

## 2019-05-29 PROCEDURE — 85610 PROTHROMBIN TIME: CPT

## 2019-05-30 NOTE — PROGRESS NOTES
Called to pre-assess for [facility] poss with Dr Tab Wu , Scheduled 5/31/19. No answer & message left.

## 2019-05-31 ENCOUNTER — HOSPITAL ENCOUNTER (OUTPATIENT)
Dept: CARDIAC CATH/INVASIVE PROCEDURES | Age: 69
Discharge: HOME | End: 2019-06-01
Attending: INTERNAL MEDICINE | Admitting: INTERNAL MEDICINE
Payer: MEDICARE

## 2019-05-31 PROBLEM — R07.9 CHEST PAIN: Status: ACTIVE | Noted: 2019-05-31

## 2019-05-31 PROBLEM — Z98.61 CAD S/P PERCUTANEOUS CORONARY ANGIOPLASTY: Status: ACTIVE | Noted: 2019-05-31

## 2019-05-31 PROBLEM — I25.10 CAD S/P PERCUTANEOUS CORONARY ANGIOPLASTY: Status: ACTIVE | Noted: 2019-05-31

## 2019-05-31 LAB
ACT BLD: 307 SECS (ref 70–128)
ATRIAL RATE: 69 BPM
ATRIAL RATE: 74 BPM
CALCULATED P AXIS, ECG09: 79 DEGREES
CALCULATED P AXIS, ECG09: 84 DEGREES
CALCULATED R AXIS, ECG10: 55 DEGREES
CALCULATED R AXIS, ECG10: 79 DEGREES
CALCULATED T AXIS, ECG11: 135 DEGREES
CALCULATED T AXIS, ECG11: 37 DEGREES
DIAGNOSIS, 93000: NORMAL
DIAGNOSIS, 93000: NORMAL
GLUCOSE BLD STRIP.AUTO-MCNC: 76 MG/DL (ref 65–100)
P-R INTERVAL, ECG05: 160 MS
P-R INTERVAL, ECG05: 172 MS
Q-T INTERVAL, ECG07: 384 MS
Q-T INTERVAL, ECG07: 430 MS
QRS DURATION, ECG06: 84 MS
QRS DURATION, ECG06: 88 MS
QTC CALCULATION (BEZET), ECG08: 426 MS
QTC CALCULATION (BEZET), ECG08: 460 MS
VENTRICULAR RATE, ECG03: 69 BPM
VENTRICULAR RATE, ECG03: 74 BPM

## 2019-05-31 PROCEDURE — C1894 INTRO/SHEATH, NON-LASER: HCPCS

## 2019-05-31 PROCEDURE — C1887 CATHETER, GUIDING: HCPCS

## 2019-05-31 PROCEDURE — 99152 MOD SED SAME PHYS/QHP 5/>YRS: CPT

## 2019-05-31 PROCEDURE — 99153 MOD SED SAME PHYS/QHP EA: CPT

## 2019-05-31 PROCEDURE — 92928 PRQ TCAT PLMT NTRAC ST 1 LES: CPT

## 2019-05-31 PROCEDURE — C1725 CATH, TRANSLUMIN NON-LASER: HCPCS

## 2019-05-31 PROCEDURE — 93458 L HRT ARTERY/VENTRICLE ANGIO: CPT

## 2019-05-31 PROCEDURE — 82962 GLUCOSE BLOOD TEST: CPT

## 2019-05-31 PROCEDURE — C1769 GUIDE WIRE: HCPCS

## 2019-05-31 PROCEDURE — 85347 COAGULATION TIME ACTIVATED: CPT

## 2019-05-31 PROCEDURE — 74011250637 HC RX REV CODE- 250/637: Performed by: INTERNAL MEDICINE

## 2019-05-31 PROCEDURE — 77030004534 HC CATH ANGI DX INFN CARD -A

## 2019-05-31 PROCEDURE — 93308 TTE F-UP OR LMTD: CPT

## 2019-05-31 PROCEDURE — C1753 CATH, INTRAVAS ULTRASOUND: HCPCS

## 2019-05-31 PROCEDURE — 74011250636 HC RX REV CODE- 250/636: Performed by: INTERNAL MEDICINE

## 2019-05-31 PROCEDURE — 77030029997 HC DEV COM RDL R BND TELE -B

## 2019-05-31 PROCEDURE — 74011250636 HC RX REV CODE- 250/636

## 2019-05-31 PROCEDURE — 74011000250 HC RX REV CODE- 250: Performed by: INTERNAL MEDICINE

## 2019-05-31 PROCEDURE — 93005 ELECTROCARDIOGRAM TRACING: CPT | Performed by: INTERNAL MEDICINE

## 2019-05-31 PROCEDURE — 92978 ENDOLUMINL IVUS OCT C 1ST: CPT

## 2019-05-31 PROCEDURE — C1874 STENT, COATED/COV W/DEL SYS: HCPCS

## 2019-05-31 PROCEDURE — 74011250637 HC RX REV CODE- 250/637

## 2019-05-31 PROCEDURE — 74011250637 HC RX REV CODE- 250/637: Performed by: NURSE PRACTITIONER

## 2019-05-31 PROCEDURE — 74011250636 HC RX REV CODE- 250/636: Performed by: NURSE PRACTITIONER

## 2019-05-31 PROCEDURE — 77010033678 HC OXYGEN DAILY

## 2019-05-31 PROCEDURE — 93005 ELECTROCARDIOGRAM TRACING: CPT | Performed by: NURSE PRACTITIONER

## 2019-05-31 PROCEDURE — 74011636320 HC RX REV CODE- 636/320: Performed by: INTERNAL MEDICINE

## 2019-05-31 PROCEDURE — 94760 N-INVAS EAR/PLS OXIMETRY 1: CPT

## 2019-05-31 RX ORDER — MIDAZOLAM HYDROCHLORIDE 1 MG/ML
.5-2 INJECTION, SOLUTION INTRAMUSCULAR; INTRAVENOUS
Status: DISCONTINUED | OUTPATIENT
Start: 2019-05-31 | End: 2019-05-31 | Stop reason: HOSPADM

## 2019-05-31 RX ORDER — PANTOPRAZOLE SODIUM 40 MG/1
40 TABLET, DELAYED RELEASE ORAL 2 TIMES DAILY
Status: DISCONTINUED | OUTPATIENT
Start: 2019-05-31 | End: 2019-06-01 | Stop reason: HOSPADM

## 2019-05-31 RX ORDER — NITROGLYCERIN 0.4 MG/1
TABLET SUBLINGUAL
Status: COMPLETED
Start: 2019-05-31 | End: 2019-05-31

## 2019-05-31 RX ORDER — ACETAMINOPHEN 325 MG/1
650 TABLET ORAL
Status: DISCONTINUED | OUTPATIENT
Start: 2019-05-31 | End: 2019-06-01 | Stop reason: HOSPADM

## 2019-05-31 RX ORDER — HEPARIN SODIUM 10000 [USP'U]/ML
1000-10000 INJECTION, SOLUTION INTRAVENOUS; SUBCUTANEOUS
Status: DISCONTINUED | OUTPATIENT
Start: 2019-05-31 | End: 2019-05-31 | Stop reason: HOSPADM

## 2019-05-31 RX ORDER — SODIUM CHLORIDE 9 MG/ML
75 INJECTION, SOLUTION INTRAVENOUS CONTINUOUS
Status: DISPENSED | OUTPATIENT
Start: 2019-05-31 | End: 2019-05-31

## 2019-05-31 RX ORDER — HEPARIN SODIUM 200 [USP'U]/100ML
3 INJECTION, SOLUTION INTRAVENOUS CONTINUOUS
Status: DISCONTINUED | OUTPATIENT
Start: 2019-05-31 | End: 2019-05-31 | Stop reason: HOSPADM

## 2019-05-31 RX ORDER — CLOPIDOGREL BISULFATE 75 MG/1
75 TABLET ORAL DAILY
Status: DISCONTINUED | OUTPATIENT
Start: 2019-06-01 | End: 2019-06-01 | Stop reason: HOSPADM

## 2019-05-31 RX ORDER — GUAIFENESIN 100 MG/5ML
81-324 LIQUID (ML) ORAL ONCE
Status: DISCONTINUED | OUTPATIENT
Start: 2019-05-31 | End: 2019-05-31

## 2019-05-31 RX ORDER — HYDROMORPHONE HYDROCHLORIDE 2 MG/ML
1 INJECTION, SOLUTION INTRAMUSCULAR; INTRAVENOUS; SUBCUTANEOUS
Status: DISCONTINUED | OUTPATIENT
Start: 2019-05-31 | End: 2019-05-31

## 2019-05-31 RX ORDER — ASPIRIN 81 MG/1
81 TABLET ORAL DAILY
Status: DISCONTINUED | OUTPATIENT
Start: 2019-06-01 | End: 2019-06-01 | Stop reason: HOSPADM

## 2019-05-31 RX ORDER — HYDROCODONE BITARTRATE AND ACETAMINOPHEN 5; 325 MG/1; MG/1
1 TABLET ORAL
Status: DISCONTINUED | OUTPATIENT
Start: 2019-05-31 | End: 2019-06-01 | Stop reason: HOSPADM

## 2019-05-31 RX ORDER — DIAZEPAM 5 MG/1
5 TABLET ORAL ONCE
Status: DISCONTINUED | OUTPATIENT
Start: 2019-05-31 | End: 2019-05-31 | Stop reason: HOSPADM

## 2019-05-31 RX ORDER — LIDOCAINE HYDROCHLORIDE 10 MG/ML
1-5 INJECTION INFILTRATION; PERINEURAL ONCE
Status: COMPLETED | OUTPATIENT
Start: 2019-05-31 | End: 2019-05-31

## 2019-05-31 RX ORDER — CLOPIDOGREL BISULFATE 75 MG/1
300 TABLET ORAL ONCE
Status: COMPLETED | OUTPATIENT
Start: 2019-05-31 | End: 2019-05-31

## 2019-05-31 RX ORDER — NITROGLYCERIN 0.4 MG/1
0.4 TABLET SUBLINGUAL AS NEEDED
Status: DISCONTINUED | OUTPATIENT
Start: 2019-05-31 | End: 2019-06-01 | Stop reason: HOSPADM

## 2019-05-31 RX ORDER — CARVEDILOL 3.12 MG/1
3.12 TABLET ORAL 2 TIMES DAILY WITH MEALS
Status: DISCONTINUED | OUTPATIENT
Start: 2019-05-31 | End: 2019-06-01 | Stop reason: HOSPADM

## 2019-05-31 RX ORDER — MORPHINE SULFATE 2 MG/ML
2 INJECTION, SOLUTION INTRAMUSCULAR; INTRAVENOUS
Status: DISCONTINUED | OUTPATIENT
Start: 2019-05-31 | End: 2019-06-01 | Stop reason: HOSPADM

## 2019-05-31 RX ORDER — ONDANSETRON 2 MG/ML
4 INJECTION INTRAMUSCULAR; INTRAVENOUS ONCE
Status: COMPLETED | OUTPATIENT
Start: 2019-05-31 | End: 2019-05-31

## 2019-05-31 RX ORDER — ONDANSETRON 2 MG/ML
4 INJECTION INTRAMUSCULAR; INTRAVENOUS AS NEEDED
Status: DISCONTINUED | OUTPATIENT
Start: 2019-05-31 | End: 2019-05-31 | Stop reason: HOSPADM

## 2019-05-31 RX ORDER — ATORVASTATIN CALCIUM 40 MG/1
80 TABLET, FILM COATED ORAL DAILY
Status: DISCONTINUED | OUTPATIENT
Start: 2019-06-01 | End: 2019-06-01 | Stop reason: HOSPADM

## 2019-05-31 RX ORDER — SODIUM CHLORIDE 9 MG/ML
75 INJECTION, SOLUTION INTRAVENOUS CONTINUOUS
Status: DISCONTINUED | OUTPATIENT
Start: 2019-05-31 | End: 2019-05-31

## 2019-05-31 RX ADMIN — MIDAZOLAM HYDROCHLORIDE 2 MG: 1 INJECTION, SOLUTION INTRAMUSCULAR; INTRAVENOUS at 11:28

## 2019-05-31 RX ADMIN — PANTOPRAZOLE SODIUM 40 MG: 40 TABLET, DELAYED RELEASE ORAL at 18:11

## 2019-05-31 RX ADMIN — IOPAMIDOL 260 ML: 755 INJECTION, SOLUTION INTRAVENOUS at 13:09

## 2019-05-31 RX ADMIN — HYDROMORPHONE HYDROCHLORIDE 1 MG: 2 INJECTION INTRAMUSCULAR; INTRAVENOUS; SUBCUTANEOUS at 12:07

## 2019-05-31 RX ADMIN — NITROGLYCERIN: 0.4 TABLET, ORALLY DISINTEGRATING SUBLINGUAL at 16:38

## 2019-05-31 RX ADMIN — ONDANSETRON 4 MG: 2 INJECTION INTRAMUSCULAR; INTRAVENOUS at 13:44

## 2019-05-31 RX ADMIN — HEPARIN SODIUM 2 ML: 10000 INJECTION INTRAVENOUS; SUBCUTANEOUS at 11:10

## 2019-05-31 RX ADMIN — ACETAMINOPHEN 650 MG: 325 TABLET, FILM COATED ORAL at 22:07

## 2019-05-31 RX ADMIN — HEPARIN SODIUM 2000 UNITS: 10000 INJECTION INTRAVENOUS; SUBCUTANEOUS at 12:49

## 2019-05-31 RX ADMIN — HYDROCODONE BITARTRATE AND ACETAMINOPHEN 1 TABLET: 5; 325 TABLET ORAL at 18:12

## 2019-05-31 RX ADMIN — SODIUM CHLORIDE 75 ML/HR: 900 INJECTION, SOLUTION INTRAVENOUS at 16:17

## 2019-05-31 RX ADMIN — MIDAZOLAM HYDROCHLORIDE 2 MG: 1 INJECTION, SOLUTION INTRAMUSCULAR; INTRAVENOUS at 11:09

## 2019-05-31 RX ADMIN — MIDAZOLAM HYDROCHLORIDE 2 MG: 1 INJECTION, SOLUTION INTRAMUSCULAR; INTRAVENOUS at 11:49

## 2019-05-31 RX ADMIN — SODIUM CHLORIDE 75 ML/HR: 900 INJECTION, SOLUTION INTRAVENOUS at 10:06

## 2019-05-31 RX ADMIN — ONDANSETRON 4 MG: 2 INJECTION INTRAMUSCULAR; INTRAVENOUS at 17:08

## 2019-05-31 RX ADMIN — HEPARIN SODIUM 3 ML/HR: 5000 INJECTION, SOLUTION INTRAVENOUS; SUBCUTANEOUS at 11:08

## 2019-05-31 RX ADMIN — CLOPIDOGREL BISULFATE 300 MG: 75 TABLET ORAL at 11:25

## 2019-05-31 RX ADMIN — LIDOCAINE HYDROCHLORIDE 3 ML: 10 INJECTION, SOLUTION INFILTRATION; PERINEURAL at 11:09

## 2019-05-31 RX ADMIN — HEPARIN SODIUM 5500 UNITS: 10000 INJECTION INTRAVENOUS; SUBCUTANEOUS at 11:20

## 2019-05-31 RX ADMIN — ONDANSETRON 4 MG: 2 INJECTION INTRAMUSCULAR; INTRAVENOUS at 13:18

## 2019-05-31 RX ADMIN — HEPARIN SODIUM 2000 UNITS: 10000 INJECTION INTRAVENOUS; SUBCUTANEOUS at 12:06

## 2019-05-31 NOTE — PROGRESS NOTES
Transported to room 2232. Remains slightly nauseated after transport. Right wrist slightly bruised with no active bleeding noted. TR band intact.

## 2019-05-31 NOTE — PROGRESS NOTES
Paged and spoke with Larry Aguila NP about patient having chest pain, nausea.   Ordered STAT EKG, nitro SQx3 as needed, and 4mg of Zofran

## 2019-05-31 NOTE — PROGRESS NOTES
TRANSFER - OUT REPORT:    Verbal report given to ALINE Ragsdale(name) on Renae Luna  being transferred to 223(unit) for routine post - op       Report consisted of patients Situation, Background, Assessment and   Recommendations(SBAR). Information from the following report(s) SBAR, Procedure Summary, Intake/Output, MAR and Cardiac Rhythm SB was reviewed with the receiving nurse. Lines:   Peripheral IV 05/31/19 Anterior;Proximal;Right Forearm (Active)   Site Assessment Clean, dry, & intact; Clean;Dry 5/31/2019  2:30 PM   Phlebitis Assessment 0 5/31/2019  2:30 PM   Infiltration Assessment 0 5/31/2019  2:30 PM   Dressing Status Clean, dry, & intact; Clean;Dry 5/31/2019  2:30 PM   Dressing Type Tape;Transparent 5/31/2019  2:30 PM   Hub Color/Line Status Patent;Capped;Pink 5/31/2019  2:30 PM        Opportunity for questions and clarification was provided.       Patient transported with:   Monitor  Registered Nurse

## 2019-05-31 NOTE — PROGRESS NOTES
TRANSFER - OUT REPORT:    R radial [facility] with Dr Abbie Weeks 6 mg  Dilaudid 1 mg  Plavix 300 mg  Heparin 11,000 units total  Stent to the RCA  TR band 10 ml at 1310  No bleeding or hematoma noted at site. Site soft    Verbal report given to Sophia(name) on Browning Minks  being transferred to CPRU(unit) for routine progression of care       Report consisted of patients Situation, Background, Assessment and   Recommendations(SBAR). Information from the following report(s) Procedure Summary was reviewed with the receiving nurse. Lines:   Peripheral IV 05/31/19 Anterior;Proximal;Right Forearm (Active)        Opportunity for questions and clarification was provided.       Patient transported with:   Registered Nurse

## 2019-05-31 NOTE — PROGRESS NOTES
Patient received to [address] [place] [address] # 11  Ambulatory from [facility]. Patient scheduled for [facility] today with Dr Travon Alston. Procedure reviewed & questions answered, voiced good understanding consent obtained & placed on chart. All medications and medical history reviewed. Will prep patient per orders. Patient & family updated on plan of care. The patient has a fraility score of 3-MANAGING WELL, based on patient A&Ox3, patient able to ambulate to room without difficulty.

## 2019-05-31 NOTE — PROGRESS NOTES
Report received from [address] [place] [address]. Procedural findings communicated. Intra procedural  medication administration reviewed. Progression of care discussed.      Patient received into [address] [facility] 1 post sheath removal.     Access site without bleeding or swelling yes    Dressing dry and intact yes    Patient instructed to limit movement to right upper extremity    Routine post procedural vital signs and site assessment initiated yes

## 2019-05-31 NOTE — PROCEDURES
Brief Cardiac Procedure Note    Patient: Ambrosio Bird MRN: [id number]  SSN: [id number]    YOB: 1950  Age: 70 y.o. Sex: female      Date of Procedure: 5/31/2019     Pre-procedure Diagnosis: Unstable Angina    Post-procedure Diagnosis: Unstable Angina    Reason for Procedure: Worsening Angina    Procedure: Left Heart Catheterization with Percutaneous Coronary Intervention    Brief Description of Procedure: rra    Performed By: Sudheer Case MD     Assistants:     Anesthesia: Moderate Sedation    Estimated Blood Loss: Less than 10 mL      Specimens: None    Implants: None    Findings:   Ef nml  Lm ok  Lad stents ok  lcx 100  rca stents 99% mid  Pci:  0% rca 26, 4.0 Aron      + heparin  Via rra    Complications: None    Recommendations: Continue medical therapy.     Signed By: Sudheer Case MD     May 31, 2019

## 2019-05-31 NOTE — PROGRESS NOTES
Problem: Falls - Risk of  Goal: *Absence of Falls  Description  Document Callum Alvarez Fall Risk and appropriate interventions in the flowsheet.   Outcome: Progressing Towards Goal  Note:   Fall Risk Interventions:  Mobility Interventions: Communicate number of staff needed for ambulation/transfer, Patient to call before getting OOB         Medication Interventions: Patient to call before getting OOB, Teach patient to arise slowly    Elimination Interventions: Call light in reach, Patient to call for help with toileting needs

## 2019-05-31 NOTE — PROGRESS NOTES
TRANSFER - IN REPORT:    Verbal report received from Sophia(name) on [facility]  being received from Cath Lab(unit) for routine progression of care      Report consisted of patients Situation, Background, Assessment and   Recommendations(SBAR). Information from the following report(s) SBAR, Kardex, Procedure Summary, Intake/Output, MAR, Recent Results and Cardiac Rhythm Sinus Bradycardia was reviewed with the receiving nurse. Opportunity for questions and clarification was provided. Assessment completed upon patients arrival to unit and care assumed. Dual skin assessment completed with Aleshia Burrows RN. No redness or breakdown noted. R radial cath site TR band in place with 10ml air. Slight sanguinous oozing with small hematoma. Fingers distal to right radial site purple and cool. Cap refill less than 3 seconds.

## 2019-05-31 NOTE — PROGRESS NOTES
Patient took Aspirin 324mg today at 0700 prior to arrival. Patient took plavix 75mg at 0700 prior to arrival

## 2019-05-31 NOTE — PROGRESS NOTES
Bedside shift report given to Allegra Duran, [facility]. Patient's R radial site has mild bruising, not currently bleeding. 2 ml left in TR band. BPs going Q 30. Pt resting quietly in bed, respirations present, no needs stated.

## 2019-05-31 NOTE — PROGRESS NOTES
After 1 nitro, patient's BP is 85/52, returned to laying position and BP is 77/47. Byron Phillips NP ordered 250ml bolus of 0.9% normal saline. Patient's BP is now 92/50.

## 2019-06-01 VITALS
BODY MASS INDEX: 21.83 KG/M2 | WEIGHT: 139.1 LBS | SYSTOLIC BLOOD PRESSURE: 134 MMHG | HEIGHT: 67 IN | DIASTOLIC BLOOD PRESSURE: 58 MMHG | OXYGEN SATURATION: 91 % | HEART RATE: 85 BPM | TEMPERATURE: 97.5 F | RESPIRATION RATE: 18 BRPM

## 2019-06-01 LAB
ANION GAP SERPL CALC-SCNC: 10 MMOL/L (ref 7–16)
BASOPHILS # BLD: 0 K/UL (ref 0–0.2)
BASOPHILS NFR BLD: 0 % (ref 0–2)
BUN SERPL-MCNC: 13 MG/DL (ref 8–23)
CALCIUM SERPL-MCNC: 8.2 MG/DL (ref 8.3–10.4)
CHLORIDE SERPL-SCNC: 109 MMOL/L (ref 98–107)
CO2 SERPL-SCNC: 21 MMOL/L (ref 21–32)
CREAT SERPL-MCNC: 0.61 MG/DL (ref 0.6–1)
DIFFERENTIAL METHOD BLD: ABNORMAL
EOSINOPHIL # BLD: 0 K/UL (ref 0–0.8)
EOSINOPHIL NFR BLD: 0 % (ref 0.5–7.8)
ERYTHROCYTE [DISTWIDTH] IN BLOOD BY AUTOMATED COUNT: 15.6 % (ref 11.9–14.6)
GLUCOSE SERPL-MCNC: 134 MG/DL (ref 65–100)
HCT VFR BLD AUTO: 31.3 % (ref 35.8–46.3)
HGB BLD-MCNC: 10.1 G/DL (ref 11.7–15.4)
IMM GRANULOCYTES # BLD AUTO: 0 K/UL (ref 0–0.5)
IMM GRANULOCYTES NFR BLD AUTO: 0 % (ref 0–5)
LYMPHOCYTES # BLD: 0.8 K/UL (ref 0.5–4.6)
LYMPHOCYTES NFR BLD: 9 % (ref 13–44)
MCH RBC QN AUTO: 29.2 PG (ref 26.1–32.9)
MCHC RBC AUTO-ENTMCNC: 32.3 G/DL (ref 31.4–35)
MCV RBC AUTO: 90.5 FL (ref 79.6–97.8)
MONOCYTES # BLD: 0.6 K/UL (ref 0.1–1.3)
MONOCYTES NFR BLD: 7 % (ref 4–12)
NEUTS SEG # BLD: 7.6 K/UL (ref 1.7–8.2)
NEUTS SEG NFR BLD: 84 % (ref 43–78)
NRBC # BLD: 0 K/UL (ref 0–0.2)
PLATELET # BLD AUTO: 228 K/UL (ref 150–450)
PMV BLD AUTO: 10.3 FL (ref 9.4–12.3)
POTASSIUM SERPL-SCNC: 3.8 MMOL/L (ref 3.5–5.1)
RBC # BLD AUTO: 3.46 M/UL (ref 4.05–5.2)
SODIUM SERPL-SCNC: 140 MMOL/L (ref 136–145)
WBC # BLD AUTO: 9.1 K/UL (ref 4.3–11.1)

## 2019-06-01 PROCEDURE — 74011250637 HC RX REV CODE- 250/637: Performed by: INTERNAL MEDICINE

## 2019-06-01 PROCEDURE — 80048 BASIC METABOLIC PNL TOTAL CA: CPT

## 2019-06-01 PROCEDURE — 85025 COMPLETE CBC W/AUTO DIFF WBC: CPT

## 2019-06-01 PROCEDURE — 74011250637 HC RX REV CODE- 250/637: Performed by: NURSE PRACTITIONER

## 2019-06-01 PROCEDURE — 36415 COLL VENOUS BLD VENIPUNCTURE: CPT

## 2019-06-01 RX ADMIN — PANTOPRAZOLE SODIUM 40 MG: 40 TABLET, DELAYED RELEASE ORAL at 08:43

## 2019-06-01 RX ADMIN — ACETAMINOPHEN 650 MG: 325 TABLET, FILM COATED ORAL at 03:18

## 2019-06-01 RX ADMIN — CLOPIDOGREL BISULFATE 75 MG: 75 TABLET ORAL at 08:44

## 2019-06-01 RX ADMIN — ASPIRIN 81 MG: 81 TABLET ORAL at 08:43

## 2019-06-01 RX ADMIN — HYDROCODONE BITARTRATE AND ACETAMINOPHEN 1 TABLET: 5; 325 TABLET ORAL at 00:37

## 2019-06-01 RX ADMIN — HYDROCODONE BITARTRATE AND ACETAMINOPHEN 1 TABLET: 5; 325 TABLET ORAL at 08:51

## 2019-06-01 NOTE — PROCEDURES
[facility]  CARDIAC CATH    Name:  Lang Pang  MR#:  [id number]  :  1950  ACCOUNT #:  [de-identified]  DATE OF SERVICE:  2019      PROCEDURES PERFORMED:  1. Left heart catheterization, left ventriculography, and coronary angiography. 2.  PCI, right coronary artery. PREOPERATIVE DIAGNOSES:  cad. POSTOPERATIVE DIAGNOSES:  cad. SURGEON:  Garo Puckett MD    ASSISTANT:  0    ESTIMATED BLOOD LOSS:  0.    SPECIMENS REMOVED:  0.    COMPLICATIONS:  0.    IMPLANTS: coronary stent. ANESTHESIA:  Conscious sedation. HISTORY:  This is a 34-year-old lady with coronary disease. She has had prior remote PCI. She has done well through the years until recently when she has had problems with worsening angina pectoris - new onset. A cardiac catheterization, possible angioplasty is recommended. PROCEDURE AND FINDINGS:  Via the right radial artery, a 5-Belgian multipurpose was advanced to the ascending aorta. Injection of the left coronary was then performed. This revealed normal left main. It divides into LAD and left circumflex. The LAD has been previously stented through its proximal segment with no significant restenosis. There is a jailed diagonal branch which has some ostial narrowing with no severe stenosis. Further middle and distal portion of the vessel had mild disease. The left circumflex has been stented from its proximal through its middle and into its distal segment. The stent is occluded near its origin in the proximal segment. There is not much in the way of distal collateral filling from the left coronary artery. The multipurpose was then placed in the left ventricle. Left ventriculography was performed. The overall left ventricular size is normal.  The ejection fraction is 65%. Left ventricular pressure was 10 mmHg and there was no gradient on pullback across the aortic valve. The right coronary artery was then injected with a 5-Belgian JR-5.   This reveals the right coronary artery had been stented from its proximal through its middle into its distal segment to the crux and into the posterior descending branch. The right coronary artery has high-grade subtotal occlusion over 99% in 2 spots in the middle segment. The distal vessel fills by antegrade filling. A PCI is embarked upon. She is anticoagulated with heparin. Using a sheathless technique, a 6-Lebanese AL-1 guide was advanced to the right coronary artery. The total occlusion was not easily crossed. It required an ultimate employment of a SuperCross catheter and a Confianza Pro. The Confianza Pro got through the total occlusion. The distal vessel was then navigated with a William wire. With the wire in a safe distal position and with the help of GuideLiner extra support, a 1.25 balloon was able to be advanced through the total occlusion inflated, followed by a 2.0 balloon. This William wire was then changed out for the Runthrough wire. IVUS was attempted, but would not cross until the stented segments received high-pressure 4.0 balloon inflation. Following this and following IVUS, the mid right coronary artery stenosis was stented with a 4.0 x 26 Aron stent postdilated high pressure 4.0 mm. The entire stented segment from the crux to the proximal segment received high-pressure 4.0 inflation. Final angiography showed a good result. There was no perforation. No complication. The final angiographic result showed no residual narrowing. The total occlusion has now gone. The distal vessels are atherosclerotic. There is diffuse calcium. There is now better filling of the collateral flow to the left circumflex. IMPRESSION:  1. Normal left ventricular function. 2.  Coronary artery disease as described. The left anterior descending has been previously stented through its proximal to mid left anterior descending segment, no restenosis.   The left circumflex has been previously stented from its proximal to the distal segment and it is chronically occluded. The right coronary artery has been previously stented from its proximal to its distal segment. There is a high-grade subtotal occlusion in the mid segment. 3.  Successful percutaneous coronary intervention with stenting of the right coronary artery performed as described above. Good result was obtained with aggressive 4.0 high-pressure balloon expansion along the entire stented segment and deployment of a 4.0 x 26 Michael stent in the area of occlusion.       Yuli Zimmer MD      [id number]  D:  05/31/2019 13:30  T:  05/31/2019 13:42  JOB #:  [id number]

## 2019-06-01 NOTE — PROGRESS NOTES
Bedside shift change report given to Venecia Rivera (oncoming nurse) by Janet Palacios (offgoing nurse). Report included the following information SBAR, Kardex, Intake/Output, MAR, Recent Results and Cardiac Rhythm NSR.

## 2019-06-01 NOTE — PROGRESS NOTES
[facility] CARDIOLOGY PROGRESS NOTE           6/1/2019 7:32 AM    Admit Date: 5/31/2019      Subjective:   Some chest soreness. ROS:  Cardiovascular:  As noted above    Objective:      Vitals:    05/31/19 2011 05/31/19 2041 05/31/19 2211 06/01/19 0315   BP: 121/61 120/61 114/54 119/57   Pulse: 73 79 82 91   Resp:   20 18   Temp:   98.2 °F (36.8 °C) 98.9 °F (37.2 °C)   SpO2:   95% 93%   Weight:    63.1 kg (139 lb 1.6 oz)   Height:           Physical Exam:  General-No Acute Distress  Neck- supple, no JVD  CV- regular rate and rhythm no MRG  Lung- clear bilaterally  Abd- soft, nontender, nondistended  Ext- no edema bilaterally. Skin- warm and dry    Data Review:   Recent Labs     06/01/19  0440 05/29/19  0852    138   K 3.8 4.2   BUN 13 12   CREA 0.61 0.80   * 61*   WBC 9.1 5.9   HGB 10.1* 12.0   HCT 31.3* 36.9    239   INR  --  1.1       Assessment/Plan:     Active Problems:    Chest pain (5/31/2019)          CAD S/P percutaneous coronary angioplasty (5/31/2019)      ///  OK post pci. Home today.           Kenny Vazquez MD  6/1/2019 7:32 AM

## 2019-06-01 NOTE — DISCHARGE SUMMARY
[facility] Cardiology Discharge Summary     Patient ID:   Saul Galvan  [id number]  70 y.o.  1950    Admit date: 5/31/2019    Discharge date:  6/1/2019    Admitting Physician: Deb Martins MD     Discharge Physician: Dr. Jacinto Weaver    Admission Diagnoses: CAD (coronary artery disease) [I25.10]  Chest pain [R07.9]  CAD S/P percutaneous coronary angioplasty [I25.10, Z98.61]    Discharge Diagnoses:    Diagnosis    Chest pain    CAD S/P percutaneous coronary angioplasty    Ventricular ectopy    Chronic anxiety    Hyperlipidemia    Essential hypertension, benign    COPD (chronic obstructive pulmonary disease)     CAD (coronary artery disease)    Tobacco abuse    History of MI (myocardial infarction)       Cardiology Procedures this admission:  Left heart catheterization with PCI  Consults: None    Hospital Course: Patient was seen at the office of [facility] Cardiology by Dr. Jacinto Weaver for complaints of worsening angina and was scheduled [place] [facility] [place] [facility] [place]. Patient underwent cardiac catheterization by Dr. Jacinto Weaver. Patient was found to have a 99% stenosis of the mRCA that was stented with a 4.0 x 26 mm [employer] ANNITA with 0% residual stenosis. Patient tolerated the procedure well and was taken to the telemetry floor for recovery. The following morning patient was up feeling well without any complaints of shortness of breath, but some c/o soreness in chest. Patient's right radial cath site was clean, dry and intact without hematoma or bruit. Patient's labs were stable, Hgb 10.1. Patient was seen and examined by Dr. Jacinto Weaver and determined stable and ready for discharge. Patient was instructed on the importance of medication compliance including taking Aspirin and Plavix everyday without missing a dose. After receiving drug eluting stents, the patient will remain on dual anti-platelet therapy for 1 year. For maximized medical therapy for CAD, patient will continue BB, ACE-I, and statin as well.  The patient will follow up with [facility] Cardiology and has been referred to cardiac rehab. DISPOSITION: The patient is being discharged home in stable condition on a low saturated fat, low cholesterol and low salt diet. The patient is instructed to advance activities as tolerated to the limit of fatigue or shortness of breath. The patient is instructed to avoid all heavy lifting, straining, stooping or squatting for 3-5 days. The patient is instructed to watch the cath site for bleeding/oozing; if seen, the patient is instructed to apply firm pressure with a clean cloth and call [facility] Cardiology at [phone]. The patient is instructed to watch for signs of infection which include: increasing area of redness, fever/hot to touch or purulent drainage at the catheterization site. The patient is instructed not to soak in a bathtub for 7-10 days, but is cleared to shower. The patient is instructed to call the office or return to the ER for immediate evaluation for any shortness of breath or chest pain not relieved by NTG. Discharge Exam:   Visit Vitals  /61   Pulse 79   Temp 97.7 °F (36.5 °C)   Resp 16   Ht 5' 7\" (1.702 m)   Wt 56.2 kg (124 lb)   SpO2 95%   Breastfeeding? No   BMI 19.42 kg/m²       Patient has been seen by Dr. Rosy Kelley: see his progress note for exam details.     Recent Results (from the past 24 hour(s))   EKG, 12 LEAD, INITIAL    Collection Time: 05/31/19  9:59 AM   Result Value Ref Range    Ventricular Rate 74 BPM    Atrial Rate 74 BPM    P-R Interval 160 ms    QRS Duration 88 ms    Q-T Interval 384 ms    QTC Calculation (Bezet) 426 ms    Calculated P Axis 84 degrees    Calculated R Axis 79 degrees    Calculated T Axis 37 degrees    Diagnosis       Normal sinus rhythm  Nonspecific ST abnormality  Abnormal ECG  When compared with ECG of 26-APR-2017 11:02,  Fusion complexes are no longer Present  Premature ventricular complexes are no longer Present  Premature supraventricular complexes are no longer Present  ST now depressed in Inferior leads  Nonspecific T wave abnormality no longer evident in Anterolateral leads  Confirmed by ISAMAR ZAMORA (), Wanda Grimes ([id number]) on 5/31/2019 11:32:21 AM     GLUCOSE, POC    Collection Time: 05/31/19 10:09 AM   Result Value Ref Range    Glucose (POC) 76 65 - 100 mg/dL   POC ACTIVATED CLOTTING TIME    Collection Time: 05/31/19 11:32 AM   Result Value Ref Range    Activated Clotting Time (POC) 307 (H) 70 - 128 SECS   EKG, 12 LEAD, INITIAL    Collection Time: 05/31/19  5:01 PM   Result Value Ref Range    Ventricular Rate 69 BPM    Atrial Rate 69 BPM    P-R Interval 172 ms    QRS Duration 84 ms    Q-T Interval 430 ms    QTC Calculation (Bezet) 460 ms    Calculated P Axis 79 degrees    Calculated R Axis 55 degrees    Calculated T Axis 135 degrees    Diagnosis       Normal sinus rhythm  T wave abnormality, consider anterolateral ischemia  Abnormal ECG  When compared with ECG of 31-MAY-2019 09:59,  T wave inversion now evident in Anterolateral leads  Confirmed by ST NEIL MAYES MD (), JULIA HINTON ([id number]) on 5/31/2019 5:57:17 PM           Patient Instructions:   Current Discharge Medication List      CONTINUE these medications which have NOT CHANGED    Details   penicillin v potassium (VEETID) 500 mg tablet Take 1,000 mg by mouth two (2) times a day. tiotropium (SPIRIVA WITH HANDIHALER) 18 mcg inhalation capsule Take 1 Cap by inhalation daily. Qty: 90 Cap, Refills: 3      lisinopril (PRINIVIL, ZESTRIL) 2.5 mg tablet Take 1 Tab by mouth daily. Qty: 80 Tab, Refills: 3    Associated Diagnoses: Coronary artery disease involving native coronary artery without angina pectoris      atorvastatin (LIPITOR) 80 mg tablet Take 1 Tab by mouth daily. Qty: 90 Tab, Refills: 3    Associated Diagnoses: Coronary artery disease involving native coronary artery without angina pectoris; Hyperlipidemia      clopidogrel (PLAVIX) 75 mg tab Take 1 Tab by mouth daily.   Qty: 90 Tab, Refills: 3    Associated Diagnoses: Coronary artery disease involving native coronary artery without angina pectoris      pantoprazole (PROTONIX) 40 mg tablet Take 1 Tab by mouth two (2) times a day. Qty: 180 Tab, Refills: 3      carvedilol (COREG) 3.125 mg tablet Take 1 Tab by mouth two (2) times daily (with meals). Qty: 180 Tab, Refills: 12    Comments: **Patient requests 90 days supply**      montelukast (SINGULAIR) 10 mg tablet Take 1 Tab by mouth daily. Qty: 90 Tab, Refills: 3      budesonide-formoterol (SYMBICORT) 160-4.5 mcg/actuation HFAA Take 2 Puffs by inhalation two (2) times a day. multivitamin (ONE A DAY) tablet Take 1 Tab by mouth daily. guaiFENesin ER (MUCINEX) 600 mg ER tablet Take 600 mg by mouth two (2) times daily as needed. aspirin delayed-release 81 mg tablet Take  by mouth daily. nitroglycerin (NITROSTAT) 0.4 mg SL tablet 1 Tab by SubLINGual route every five (5) minutes as needed for Chest Pain. Qty: 1 Bottle, Refills: 12    Associated Diagnoses: Coronary artery disease involving native coronary artery without angina pectoris      albuterol (PROVENTIL HFA, VENTOLIN HFA, PROAIR HFA) 90 mcg/actuation inhaler Take 1 Puff by inhalation every four (4) hours as needed for Wheezing. Qty: 3 Inhaler, Refills: 3      cetirizine (ZYRTEC) 10 mg tablet Take  by mouth daily as needed.          Anh Chacon PA-C

## 2019-06-01 NOTE — PROGRESS NOTES
Discharge instructions, follow up appointments and prescriptions reviewed with patient and family. Both verbalize understanding. All personal belongings taken with patient. Family member will drive patient home. Patient escorted to discharge area via wheelchair. Patient is stable at discharge. Patient's R radial site has small slow oozing. Tegaderm and gauze is still on area. Instructed patient to remove tomorrow and given extra sterile gauze if it's still oozing. Instructed to call 911 if it starts bleeding, hold arm in air and hold pressure right above site. MDs are also aware of chest soreness. Instructed patient to call 911 if worsens or to call office on Monday if soreness remains the same and does not dissipate. Removed IV and monitor.

## 2019-06-02 ENCOUNTER — APPOINTMENT (OUTPATIENT)
Dept: GENERAL RADIOLOGY | Age: 69
End: 2019-06-02
Attending: EMERGENCY MEDICINE
Payer: MEDICARE

## 2019-06-02 ENCOUNTER — HOSPITAL ENCOUNTER (EMERGENCY)
Age: 69
Discharge: HOME | End: 2019-06-02
Attending: EMERGENCY MEDICINE
Payer: MEDICARE

## 2019-06-02 VITALS
HEIGHT: 67 IN | TEMPERATURE: 98.1 F | SYSTOLIC BLOOD PRESSURE: 153 MMHG | HEART RATE: 92 BPM | WEIGHT: 124 LBS | RESPIRATION RATE: 18 BRPM | DIASTOLIC BLOOD PRESSURE: 73 MMHG | BODY MASS INDEX: 19.46 KG/M2 | OXYGEN SATURATION: 94 %

## 2019-06-02 DIAGNOSIS — R06.02 SOB (SHORTNESS OF BREATH): Primary | ICD-10-CM

## 2019-06-02 LAB
ALBUMIN SERPL-MCNC: 3.6 G/DL (ref 3.2–4.6)
ALBUMIN/GLOB SERPL: 0.8 {RATIO} (ref 1.2–3.5)
ALP SERPL-CCNC: 63 U/L (ref 50–136)
ALT SERPL-CCNC: 30 U/L (ref 12–65)
ANION GAP SERPL CALC-SCNC: 8 MMOL/L (ref 7–16)
AST SERPL-CCNC: 49 U/L (ref 15–37)
BASOPHILS # BLD: 0 K/UL (ref 0–0.2)
BASOPHILS NFR BLD: 1 % (ref 0–2)
BILIRUB SERPL-MCNC: 0.5 MG/DL (ref 0.2–1.1)
BUN SERPL-MCNC: 9 MG/DL (ref 8–23)
CALCIUM SERPL-MCNC: 9.2 MG/DL (ref 8.3–10.4)
CHLORIDE SERPL-SCNC: 105 MMOL/L (ref 98–107)
CO2 SERPL-SCNC: 27 MMOL/L (ref 21–32)
CREAT SERPL-MCNC: 0.64 MG/DL (ref 0.6–1)
DIFFERENTIAL METHOD BLD: ABNORMAL
EOSINOPHIL # BLD: 0.1 K/UL (ref 0–0.8)
EOSINOPHIL NFR BLD: 1 % (ref 0.5–7.8)
ERYTHROCYTE [DISTWIDTH] IN BLOOD BY AUTOMATED COUNT: 15.9 % (ref 11.9–14.6)
GLOBULIN SER CALC-MCNC: 4.5 G/DL (ref 2.3–3.5)
GLUCOSE SERPL-MCNC: 95 MG/DL (ref 65–100)
HCT VFR BLD AUTO: 31 % (ref 35.8–46.3)
HGB BLD-MCNC: 10.1 G/DL (ref 11.7–15.4)
IMM GRANULOCYTES # BLD AUTO: 0.1 K/UL (ref 0–0.5)
IMM GRANULOCYTES NFR BLD AUTO: 1 % (ref 0–5)
LYMPHOCYTES # BLD: 1 K/UL (ref 0.5–4.6)
LYMPHOCYTES NFR BLD: 11 % (ref 13–44)
MCH RBC QN AUTO: 29.6 PG (ref 26.1–32.9)
MCHC RBC AUTO-ENTMCNC: 32.6 G/DL (ref 31.4–35)
MCV RBC AUTO: 90.9 FL (ref 79.6–97.8)
MONOCYTES # BLD: 0.8 K/UL (ref 0.1–1.3)
MONOCYTES NFR BLD: 9 % (ref 4–12)
NEUTS SEG # BLD: 6.8 K/UL (ref 1.7–8.2)
NEUTS SEG NFR BLD: 78 % (ref 43–78)
NRBC # BLD: 0 K/UL (ref 0–0.2)
PLATELET # BLD AUTO: 247 K/UL (ref 150–450)
PMV BLD AUTO: 10.4 FL (ref 9.4–12.3)
POTASSIUM SERPL-SCNC: 4.3 MMOL/L (ref 3.5–5.1)
PROT SERPL-MCNC: 8.1 G/DL (ref 6.3–8.2)
RBC # BLD AUTO: 3.41 M/UL (ref 4.05–5.2)
SODIUM SERPL-SCNC: 140 MMOL/L (ref 136–145)
TROPONIN I SERPL-MCNC: 2.61 NG/ML (ref 0.02–0.05)
TROPONIN I SERPL-MCNC: 2.97 NG/ML (ref 0.02–0.05)
WBC # BLD AUTO: 8.7 K/UL (ref 4.3–11.1)

## 2019-06-02 PROCEDURE — 74011000250 HC RX REV CODE- 250: Performed by: EMERGENCY MEDICINE

## 2019-06-02 PROCEDURE — 93005 ELECTROCARDIOGRAM TRACING: CPT | Performed by: EMERGENCY MEDICINE

## 2019-06-02 PROCEDURE — 80053 COMPREHEN METABOLIC PANEL: CPT

## 2019-06-02 PROCEDURE — 74011250637 HC RX REV CODE- 250/637: Performed by: EMERGENCY MEDICINE

## 2019-06-02 PROCEDURE — 84484 ASSAY OF TROPONIN QUANT: CPT

## 2019-06-02 PROCEDURE — 94640 AIRWAY INHALATION TREATMENT: CPT

## 2019-06-02 PROCEDURE — 85025 COMPLETE CBC W/AUTO DIFF WBC: CPT

## 2019-06-02 PROCEDURE — 99284 EMERGENCY DEPT VISIT MOD MDM: CPT | Performed by: EMERGENCY MEDICINE

## 2019-06-02 PROCEDURE — 71046 X-RAY EXAM CHEST 2 VIEWS: CPT

## 2019-06-02 RX ORDER — GUAIFENESIN 100 MG/5ML
324 LIQUID (ML) ORAL ONCE
Status: COMPLETED | OUTPATIENT
Start: 2019-06-02 | End: 2019-06-02

## 2019-06-02 RX ORDER — IPRATROPIUM BROMIDE AND ALBUTEROL SULFATE 2.5; .5 MG/3ML; MG/3ML
3 SOLUTION RESPIRATORY (INHALATION)
Status: COMPLETED | OUTPATIENT
Start: 2019-06-02 | End: 2019-06-02

## 2019-06-02 RX ADMIN — ASPIRIN 81 MG 243 MG: 81 TABLET ORAL at 20:54

## 2019-06-02 RX ADMIN — IPRATROPIUM BROMIDE AND ALBUTEROL SULFATE 3 ML: .5; 3 SOLUTION RESPIRATORY (INHALATION) at 21:28

## 2019-06-02 RX ADMIN — NITROGLYCERIN 1 INCH: 20 OINTMENT TOPICAL at 20:54

## 2019-06-02 NOTE — ED TRIAGE NOTES
Patient complains of SHOB that began on Friday. States was here Friday and had cardiac stent placed.  Dr. Summer Avendano is her cardiologist.

## 2019-06-03 LAB
ATRIAL RATE: 94 BPM
CALCULATED P AXIS, ECG09: 80 DEGREES
CALCULATED R AXIS, ECG10: 83 DEGREES
CALCULATED T AXIS, ECG11: -143 DEGREES
DIAGNOSIS, 93000: NORMAL
P-R INTERVAL, ECG05: 164 MS
Q-T INTERVAL, ECG07: 242 MS
QRS DURATION, ECG06: 66 MS
QTC CALCULATION (BEZET), ECG08: 302 MS
VENTRICULAR RATE, ECG03: 94 BPM

## 2019-06-03 NOTE — ED PROVIDER NOTES
69-year-old lady presents with concerns about shortness of breath and chest soreness that has gradually gotten worse over the past couple of days. Patient says she was asked to seen here on Friday and had stents placed. She says she started to get some pain and shortness of breath at that time and over the past 48 hours it has worsened. She denies any fevers or chills and says she just feels that she cannot take a deep breath. She does have a history of previous stents but also history of COPD. Elements of this note were created using speech recognition software. As such, errors of speech recognition may be present.            Past Medical History:   Diagnosis Date    CAD (coronary artery disease) , 2013    PCI,  Chanelle Soto    Chronic anxiety 2017    COPD     Dr. Bakari Yang GERD (gastroesophageal reflux disease)     Hypertension     Nausea & vomiting     Thyroid disease        Past Surgical History:   Procedure Laterality Date    CARDIAC SURG PROCEDURE UNLIST  , 13    LAD    HX GYN      rebuilt cervix    HX TONSIL AND ADENOIDECTOMY           Family History:   Problem Relation Age of Onset    Heart Attack Father         Adoptive father    No Known Problems Mother         adopted       Social History     Socioeconomic History    Marital status:      Spouse name: Not on file    Number of children: Not on file    Years of education: Not on file    Highest education level: Not on file   Occupational History    Occupation: [employer]     Employer: SELF EMPLOYED     Comment: home   Social Needs    Financial resource strain: Not on file    Food insecurity:     Worry: Not on file     Inability: Not on file    Transportation needs:     Medical: Not on file     Non-medical: Not on file   Tobacco Use    Smoking status: Former Smoker     Years: 45.00     Types: Cigarettes     Last attempt to quit: 2013     Years since quittin.7    Smokeless tobacco: Never Used   Substance and Sexual Activity    Alcohol use: Yes     Alcohol/week: 1.8 oz     Types: 1 Glasses of wine, 1 Cans of beer, 1 Shots of liquor per week     Comment: rare    Drug use: No    Sexual activity: Not on file   Lifestyle    Physical activity:     Days per week: Not on file     Minutes per session: Not on file    Stress: Not on file   Relationships    Social connections:     Talks on phone: Not on file     Gets together: Not on file     Attends Mormon service: Not on file     Active member of club or organization: Not on file     Attends meetings of clubs or organizations: Not on file     Relationship status: Not on file    Intimate partner violence:     Fear of current or ex partner: Not on file     Emotionally abused: Not on file     Physically abused: Not on file     Forced sexual activity: Not on file   Other Topics Concern    Not on file   Social History Narrative    , has 2 children, 1 step child. 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          ALLERGIES: Phenergan [promethazine]    Review of Systems   Constitutional: Negative for chills, diaphoresis and fever. HENT: Negative for congestion, rhinorrhea and sore throat. Eyes: Negative for redness and visual disturbance. Respiratory: Positive for shortness of breath. Negative for cough, chest tightness and wheezing. Cardiovascular: Positive for chest pain. Negative for palpitations. Gastrointestinal: Negative for abdominal pain, blood in stool, diarrhea, nausea and vomiting. Endocrine: Negative for polydipsia and polyuria. Genitourinary: Negative for dysuria and hematuria. Musculoskeletal: Negative for arthralgias, myalgias and neck stiffness. Skin: Negative for rash. Allergic/Immunologic: Negative for environmental allergies and food allergies. Neurological: Negative for dizziness, weakness and headaches. Hematological: Negative for adenopathy. Does not bruise/bleed easily. Psychiatric/Behavioral: Negative for confusion and sleep disturbance. The patient is not nervous/anxious. Vitals:    06/02/19 1938   BP: 158/76   Pulse: (!) 106   Resp: 26   Temp: 98.1 °F (36.7 °C)   SpO2: 92%   Weight: 56.2 kg (124 lb)   Height: 5' 7\" (1.702 m)            Physical Exam   Constitutional: She is oriented to person, place, and time. She appears well-developed and well-nourished. HENT:   Head: Normocephalic and atraumatic. Mouth/Throat: Oropharynx is clear and moist.   Eyes: Pupils are equal, round, and reactive to light. Conjunctivae are normal. Right eye exhibits no discharge. Left eye exhibits no discharge. Neck: No thyromegaly present. Cardiovascular: Normal rate, regular rhythm and normal heart sounds. No murmur heard. Pulmonary/Chest: Effort normal. She has wheezes. Mild scattered wheezes   Abdominal: Soft. Bowel sounds are normal. There is no tenderness. There is no rebound and no guarding. Musculoskeletal: Normal range of motion. She exhibits no edema. Neurological: She is alert and oriented to person, place, and time. She exhibits normal muscle tone. Coordination normal.   Skin: Skin is warm and dry. Psychiatric: She has a normal mood and affect. Her behavior is normal.        MDM  Number of Diagnoses or Management Options  Diagnosis management comments: I will discuss the case with on-call for cardiology    9:12 PM  I discussed the case with Dr. Abel Warren from cardiology who advised that this could potentially be related to the stenting of the 99% occlusion. He did advise repeating it after an hour and a half or 2 hours to see how it is trending. In the meantime given her shortness of breath the patient has requested a breathing treatment because she does have a history of COPD. I think that is a reasonable treatment to try to see if that improves her symptoms and then we will recheck her troponin to see how it is trending.     10:09 PM  Patient said overall she is feeling much better after the breathing treatment. I will recheck the troponin. 11:31 PM  Patient troponin is trending down. He continues to feel much better. I will discharge her home.          Procedures

## 2019-06-03 NOTE — DISCHARGE INSTRUCTIONS
Return with any fevers, anginal pain, difficulty breathing, worsening symptoms, or additional concerns. Follow up with your cardiologist in 2 or 3 days for reevaluation.

## 2019-06-03 NOTE — ED NOTES
I have reviewed discharge instructions with the patient and spouse. The patient and spouse verbalized understanding. Patient left ED via Discharge Method: ambulatory to home with spouse    Opportunity for questions and clarification provided. Patient given 0 scripts. To continue your aftercare when you leave the hospital, you may receive an automated call from our care team to check in on how you are doing. This is a free service and part of our promise to provide the best care and service to meet your aftercare needs.  If you have questions, or wish to unsubscribe from this service please call [phone]. Thank you for Choosing our [facility] Emergency Department.

## 2019-06-06 ENCOUNTER — APPOINTMENT (OUTPATIENT)
Dept: GENERAL RADIOLOGY | Age: 69
DRG: 191 | End: 2019-06-06
Attending: EMERGENCY MEDICINE
Payer: MEDICARE

## 2019-06-06 ENCOUNTER — HOSPITAL ENCOUNTER (INPATIENT)
Age: 69
LOS: 1 days | Discharge: HOME | DRG: 191 | End: 2019-06-08
Attending: EMERGENCY MEDICINE | Admitting: INTERNAL MEDICINE
Payer: MEDICARE

## 2019-06-06 DIAGNOSIS — J44.9 CHRONIC OBSTRUCTIVE PULMONARY DISEASE, UNSPECIFIED COPD TYPE (HCC): Chronic | ICD-10-CM

## 2019-06-06 DIAGNOSIS — R07.9 CHEST PAIN, UNSPECIFIED TYPE: Primary | ICD-10-CM

## 2019-06-06 DIAGNOSIS — J96.11 CHRONIC RESPIRATORY FAILURE WITH HYPOXIA (HCC): ICD-10-CM

## 2019-06-06 LAB
ALBUMIN SERPL-MCNC: 3.5 G/DL (ref 3.2–4.6)
ALBUMIN/GLOB SERPL: 0.7 {RATIO} (ref 1.2–3.5)
ALP SERPL-CCNC: 69 U/L (ref 50–136)
ALT SERPL-CCNC: 22 U/L (ref 12–65)
ANION GAP SERPL CALC-SCNC: 6 MMOL/L (ref 7–16)
AST SERPL-CCNC: 21 U/L (ref 15–37)
BASOPHILS # BLD: 0 K/UL (ref 0–0.2)
BASOPHILS NFR BLD: 0 % (ref 0–2)
BILIRUB SERPL-MCNC: 0.7 MG/DL (ref 0.2–1.1)
BNP SERPL-MCNC: 387 PG/ML
BUN SERPL-MCNC: 12 MG/DL (ref 8–23)
CALCIUM SERPL-MCNC: 9.6 MG/DL (ref 8.3–10.4)
CHLORIDE SERPL-SCNC: 106 MMOL/L (ref 98–107)
CO2 SERPL-SCNC: 25 MMOL/L (ref 21–32)
CREAT SERPL-MCNC: 0.63 MG/DL (ref 0.6–1)
DIFFERENTIAL METHOD BLD: ABNORMAL
EOSINOPHIL # BLD: 0.2 K/UL (ref 0–0.8)
EOSINOPHIL NFR BLD: 2 % (ref 0.5–7.8)
ERYTHROCYTE [DISTWIDTH] IN BLOOD BY AUTOMATED COUNT: 15.6 % (ref 11.9–14.6)
GLOBULIN SER CALC-MCNC: 4.8 G/DL (ref 2.3–3.5)
GLUCOSE SERPL-MCNC: 85 MG/DL (ref 65–100)
HCT VFR BLD AUTO: 30.6 % (ref 35.8–46.3)
HGB BLD-MCNC: 10 G/DL (ref 11.7–15.4)
IMM GRANULOCYTES # BLD AUTO: 0 K/UL (ref 0–0.5)
IMM GRANULOCYTES NFR BLD AUTO: 0 % (ref 0–5)
LACTATE BLD-SCNC: 0.93 MMOL/L (ref 0.5–1.9)
LYMPHOCYTES # BLD: 0.9 K/UL (ref 0.5–4.6)
LYMPHOCYTES NFR BLD: 11 % (ref 13–44)
MCH RBC QN AUTO: 29.3 PG (ref 26.1–32.9)
MCHC RBC AUTO-ENTMCNC: 32.7 G/DL (ref 31.4–35)
MCV RBC AUTO: 89.7 FL (ref 79.6–97.8)
MONOCYTES # BLD: 0.6 K/UL (ref 0.1–1.3)
MONOCYTES NFR BLD: 8 % (ref 4–12)
NEUTS SEG # BLD: 6.2 K/UL (ref 1.7–8.2)
NEUTS SEG NFR BLD: 79 % (ref 43–78)
NRBC # BLD: 0 K/UL (ref 0–0.2)
PLATELET # BLD AUTO: 313 K/UL (ref 150–450)
PMV BLD AUTO: 9.7 FL (ref 9.4–12.3)
POTASSIUM SERPL-SCNC: 4.1 MMOL/L (ref 3.5–5.1)
PROCALCITONIN SERPL-MCNC: <0.1 NG/ML
PROT SERPL-MCNC: 8.3 G/DL (ref 6.3–8.2)
RBC # BLD AUTO: 3.41 M/UL (ref 4.05–5.2)
SODIUM SERPL-SCNC: 137 MMOL/L (ref 136–145)
TROPONIN I BLD-MCNC: 0.16 NG/ML (ref 0.02–0.05)
TROPONIN I BLD-MCNC: 0.17 NG/ML (ref 0.02–0.05)
WBC # BLD AUTO: 7.9 K/UL (ref 4.3–11.1)

## 2019-06-06 PROCEDURE — 93005 ELECTROCARDIOGRAM TRACING: CPT | Performed by: EMERGENCY MEDICINE

## 2019-06-06 PROCEDURE — 80053 COMPREHEN METABOLIC PANEL: CPT

## 2019-06-06 PROCEDURE — 71046 X-RAY EXAM CHEST 2 VIEWS: CPT

## 2019-06-06 PROCEDURE — 74011000250 HC RX REV CODE- 250: Performed by: EMERGENCY MEDICINE

## 2019-06-06 PROCEDURE — 85025 COMPLETE CBC W/AUTO DIFF WBC: CPT

## 2019-06-06 PROCEDURE — 99285 EMERGENCY DEPT VISIT HI MDM: CPT | Performed by: EMERGENCY MEDICINE

## 2019-06-06 PROCEDURE — 84145 PROCALCITONIN (PCT): CPT

## 2019-06-06 PROCEDURE — 74011250637 HC RX REV CODE- 250/637: Performed by: EMERGENCY MEDICINE

## 2019-06-06 PROCEDURE — 83605 ASSAY OF LACTIC ACID: CPT

## 2019-06-06 PROCEDURE — 99218 HC RM OBSERVATION: CPT

## 2019-06-06 PROCEDURE — 87040 BLOOD CULTURE FOR BACTERIA: CPT

## 2019-06-06 PROCEDURE — 84484 ASSAY OF TROPONIN QUANT: CPT

## 2019-06-06 PROCEDURE — 83880 ASSAY OF NATRIURETIC PEPTIDE: CPT

## 2019-06-06 PROCEDURE — 94640 AIRWAY INHALATION TREATMENT: CPT

## 2019-06-06 RX ORDER — IPRATROPIUM BROMIDE AND ALBUTEROL SULFATE 2.5; .5 MG/3ML; MG/3ML
3 SOLUTION RESPIRATORY (INHALATION)
Status: COMPLETED | OUTPATIENT
Start: 2019-06-06 | End: 2019-06-06

## 2019-06-06 RX ADMIN — NITROGLYCERIN 1 INCH: 20 OINTMENT TOPICAL at 20:11

## 2019-06-06 RX ADMIN — IPRATROPIUM BROMIDE AND ALBUTEROL SULFATE 3 ML: .5; 3 SOLUTION RESPIRATORY (INHALATION) at 20:02

## 2019-06-06 NOTE — ED TRIAGE NOTES
Pt is having midline chest pain that radiates to behind both ears since around 1700. States she has taken 2 nitro. Also having shob and bilateral lower extremity edema. States she had 1 stent placed on Friday but has \"quite a few others\". Sees Dr Radha Mariano. Denies n/v.  Sees Dr Nora Pisano for COPD.

## 2019-06-06 NOTE — ED PROVIDER NOTES
50-year-old female with history of CAD status post stent placement presented with complaint of midline chest pressure with radiation to the left jaw that started around 1700. Patient reports that his shortness of breath. Patient states she has taken 2 nitroglycerin. Denies nausea, vomiting, fever, chills, productive cough, hemoptysis, dizziness, weakness. Patient underwent stent placement on 5/31. The history is provided by the patient. No  was used. Chest Pain (Angina)    This is a new problem. The current episode started 3 to 5 hours ago. The problem has not changed since onset. The problem occurs daily. The quality of the pain is described as pressure-like. The pain radiates to the left jaw. Associated symptoms include lower extremity edema and shortness of breath. Pertinent negatives include no abdominal pain, no back pain, no claudication, no cough, no diaphoresis, no dizziness, no exertional chest pressure, no fever, no headaches, no hemoptysis, no irregular heartbeat, no leg pain, no malaise/fatigue, no nausea, no near-syncope, no numbness, no orthopnea, no palpitations, no PND, no sputum production, no vomiting and no weakness. She has tried nothing for the symptoms. The treatment provided no relief. Risk factors include hypertension and cardiac disease.         Past Medical History:   Diagnosis Date    CAD (coronary artery disease) 2009, 8/19/2013    PCI,  Moshe Martel    Chronic anxiety 4/27/2017    COPD     Dr. Barry Farias GERD (gastroesophageal reflux disease)     Hypertension     Nausea & vomiting     Thyroid disease        Past Surgical History:   Procedure Laterality Date    CARDIAC SURG PROCEDURE UNLIST  2009, 8/19/13    LAD    HX GYN      rebuilt cervix    HX TONSIL AND ADENOIDECTOMY           Family History:   Problem Relation Age of Onset    Heart Attack Father         Adoptive father    No Known Problems Mother         adopted       Social History Socioeconomic History    Marital status:      Spouse name: Not on file    Number of children: Not on file    Years of education: Not on file    Highest education level: Not on file   Occupational History    Occupation: [employer]     Employer: SELF EMPLOYED     Comment: home   Social Needs    Financial resource strain: Not on file    Food insecurity:     Worry: Not on file     Inability: Not on file    Transportation needs:     Medical: Not on file     Non-medical: Not on file   Tobacco Use    Smoking status: Former Smoker     Years: 45.00     Types: Cigarettes     Last attempt to quit: 2013     Years since quittin.8    Smokeless tobacco: Never Used   Substance and Sexual Activity    Alcohol use: Yes     Alcohol/week: 1.8 oz     Types: 1 Glasses of wine, 1 Cans of beer, 1 Shots of liquor per week     Comment: rare    Drug use: No    Sexual activity: Not on file   Lifestyle    Physical activity:     Days per week: Not on file     Minutes per session: Not on file    Stress: Not on file   Relationships    Social connections:     Talks on phone: Not on file     Gets together: Not on file     Attends Muslim service: Not on file     Active member of club or organization: Not on file     Attends meetings of clubs or organizations: Not on file     Relationship status: Not on file    Intimate partner violence:     Fear of current or ex partner: Not on file     Emotionally abused: Not on file     Physically abused: Not on file     Forced sexual activity: Not on file   Other Topics Concern    Not on file   Social History Narrative    , has 2 children, 1 step child. 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          ALLERGIES: Phenergan [promethazine]    Review of Systems   Constitutional: Negative for diaphoresis, fever and malaise/fatigue. HENT: Negative for congestion and rhinorrhea. Respiratory: Positive for shortness of breath.  Negative for cough, hemoptysis and sputum production. Cardiovascular: Positive for chest pain. Negative for palpitations, orthopnea, claudication, PND and near-syncope. Gastrointestinal: Negative for abdominal pain, nausea and vomiting. Genitourinary: Negative for dysuria and flank pain. Musculoskeletal: Negative for back pain and myalgias. Skin: Negative for rash and wound. Neurological: Negative for dizziness, weakness, numbness and headaches. Vitals:    06/06/19 1841   BP: (!) 193/91   Pulse: (!) 102   Resp: 20   Temp: 99.5 °F (37.5 °C)   SpO2: 95%   Weight: 54.4 kg (120 lb)   Height: 5' 7\" (1.702 m)            Physical Exam   Constitutional: She is oriented to person, place, and time. She appears well-developed. Patient well-appearing and in no acute distress. HENT:   Head: Normocephalic and atraumatic. MMM. No tonsillar erythema or exudate noted. Uvula midline. Eyes: Pupils are equal, round, and reactive to light. Conjunctivae and EOM are normal.   Neck: Neck supple. No JVD present. Cardiovascular: Normal rate, regular rhythm and normal heart sounds. Radial pulses 2+ and equal bilaterally. Pulmonary/Chest: Effort normal.   Mild wheezes noted bilaterally   Abdominal: Soft. Bowel sounds are normal. She exhibits no distension. There is no tenderness. Soft, NTND. No CVAT. No rebound or guarding. Musculoskeletal: Normal range of motion. Right lower leg: She exhibits edema. Left lower leg: She exhibits edema. 1+ pitting edema to bilateral LE. Neurological: She is alert and oriented to person, place, and time. No cranial nerve deficit or sensory deficit. Strength 5/5 throughout. Normal sensory exam. No facial droop. Skin: Skin is warm and dry. No rash. Psychiatric: She has a normal mood and affect. Nursing note and vitals reviewed.        MDM  Number of Diagnoses or Management Options  Chest pain, unspecified type: new and requires workup  Diagnosis management comments: 8:00 pm- cardiology consulted. Recommends repeat troponin.  =================  Repeat troponin 0.17. Patient with 100.2 F. Lactate normal.  White blood cell count normal.  Blood cultures obtained. Procal normal.  Cards called back in regards to troponin. States they will present to the bedside.  =====================  Cardiology requests hospitalist admission. State that they think the etiology of her symptoms is noncardiac. Hospitalist consulted for admission. Amount and/or Complexity of Data Reviewed  Clinical lab tests: ordered and reviewed  Tests in the radiology section of CPT®: ordered and reviewed  Tests in the medicine section of CPT®: ordered and reviewed  Review and summarize past medical records: yes  Discuss the patient with other providers: yes  Independent visualization of images, tracings, or specimens: yes    Risk of Complications, Morbidity, and/or Mortality  Presenting problems: moderate  Diagnostic procedures: moderate  Management options: moderate    Patient Progress  Patient progress: stable    ED Course as of Jun 06 1958   Thu Jun 06, 2019 1947 CXR IMPRESSION: Chronic interstitial changes. No acute abnormality evident. [DF]      ED Course User Index  [DF] Berta Puente MD       EKG  Date/Time: 6/6/2019 8:07 PM  Performed by: Berta Puente MD  Authorized by:  Berta Puente MD     ECG reviewed by ED Physician in the absence of a cardiologist: yes    Rate:     ECG rate:  91    ECG rate assessment: normal    Rhythm:     Rhythm: sinus rhythm    Ectopy:     Ectopy: none    QRS:     QRS axis:  Normal    QRS intervals:  Normal  Conduction:     Conduction: normal    ST segments:     ST segments:  Normal  T waves:     T waves: inverted      Inverted:  V4, V5 and V6            Results Include:    Recent Results (from the past 24 hour(s))   EKG, 12 LEAD, INITIAL    Collection Time: 06/06/19  6:46 PM   Result Value Ref Range    Ventricular Rate 91 BPM Atrial Rate 91 BPM    P-R Interval 154 ms    QRS Duration 88 ms    Q-T Interval 320 ms    QTC Calculation (Bezet) 393 ms    Calculated P Axis 88 degrees    Calculated R Axis 79 degrees    Calculated T Axis -143 degrees    Diagnosis       !! AGE AND GENDER SPECIFIC ECG ANALYSIS !! Sinus rhythm with Premature atrial complexes  Possible Anterior infarct , age undetermined  ST & T wave abnormality, consider lateral ischemia  Abnormal ECG  When compared with ECG of 02-JUN-2019 19:46,  Premature atrial complexes are now Present  T wave inversion more evident in Lateral leads  QT has lengthened     CBC WITH AUTOMATED DIFF    Collection Time: 06/06/19  6:57 PM   Result Value Ref Range    WBC 7.9 4.3 - 11.1 K/uL    RBC 3.41 (L) 4.05 - 5.2 M/uL    HGB 10.0 (L) 11.7 - 15.4 g/dL    HCT 30.6 (L) 35.8 - 46.3 %    MCV 89.7 79.6 - 97.8 FL    MCH 29.3 26.1 - 32.9 PG    MCHC 32.7 31.4 - 35.0 g/dL    RDW 15.6 (H) 11.9 - 14.6 %    PLATELET 144 752 - 088 K/uL    MPV 9.7 9.4 - 12.3 FL    ABSOLUTE NRBC 0.00 0.0 - 0.2 K/uL    DF AUTOMATED      NEUTROPHILS 79 (H) 43 - 78 %    LYMPHOCYTES 11 (L) 13 - 44 %    MONOCYTES 8 4.0 - 12.0 %    EOSINOPHILS 2 0.5 - 7.8 %    BASOPHILS 0 0.0 - 2.0 %    IMMATURE GRANULOCYTES 0 0.0 - 5.0 %    ABS. NEUTROPHILS 6.2 1.7 - 8.2 K/UL    ABS. LYMPHOCYTES 0.9 0.5 - 4.6 K/UL    ABS. MONOCYTES 0.6 0.1 - 1.3 K/UL    ABS. EOSINOPHILS 0.2 0.0 - 0.8 K/UL    ABS. BASOPHILS 0.0 0.0 - 0.2 K/UL    ABS. IMM.  GRANS. 0.0 0.0 - 0.5 K/UL   METABOLIC PANEL, COMPREHENSIVE    Collection Time: 06/06/19  6:57 PM   Result Value Ref Range    Sodium 137 136 - 145 mmol/L    Potassium 4.1 3.5 - 5.1 mmol/L    Chloride 106 98 - 107 mmol/L    CO2 25 21 - 32 mmol/L    Anion gap 6 (L) 7 - 16 mmol/L    Glucose 85 65 - 100 mg/dL    BUN 12 8 - 23 MG/DL    Creatinine 0.63 0.6 - 1.0 MG/DL    GFR est AA >60 >60 ml/min/1.73m2    GFR est non-AA >60 >60 ml/min/1.73m2    Calcium 9.6 8.3 - 10.4 MG/DL    Bilirubin, total 0.7 0.2 - 1.1 MG/DL    ALT (SGPT) 22 12 - 65 U/L    AST (SGOT) 21 15 - 37 U/L    Alk. phosphatase 69 50 - 136 U/L    Protein, total 8.3 (H) 6.3 - 8.2 g/dL    Albumin 3.5 3.2 - 4.6 g/dL    Globulin 4.8 (H) 2.3 - 3.5 g/dL    A-G Ratio 0.7 (L) 1.2 - 3.5     POC TROPONIN-I    Collection Time: 06/06/19  7:02 PM   Result Value Ref Range    Troponin-I (POC) 0.16 (H) 0.02 - 0.05 ng/ml              Khalif Peres MD; 6/6/2019 @8:05 PM Voice dictation software was used during the making of this note. This software is not perfect and grammatical and other typographical errors may be present.   This note has not been proofread for errors.  ===================================================================

## 2019-06-07 ENCOUNTER — APPOINTMENT (OUTPATIENT)
Dept: CT IMAGING | Age: 69
DRG: 191 | End: 2019-06-07
Attending: INTERNAL MEDICINE
Payer: MEDICARE

## 2019-06-07 LAB
ANION GAP SERPL CALC-SCNC: 9 MMOL/L (ref 7–16)
APPEARANCE UR: ABNORMAL
ATRIAL RATE: 91 BPM
ATRIAL RATE: 93 BPM
BACTERIA URNS QL MICRO: 0 /HPF
BASOPHILS # BLD: 0 K/UL (ref 0–0.2)
BASOPHILS NFR BLD: 0 % (ref 0–2)
BILIRUB UR QL: NEGATIVE
BUN SERPL-MCNC: 11 MG/DL (ref 8–23)
CALCIUM SERPL-MCNC: 8.4 MG/DL (ref 8.3–10.4)
CALCULATED P AXIS, ECG09: 69 DEGREES
CALCULATED P AXIS, ECG09: 88 DEGREES
CALCULATED R AXIS, ECG10: 67 DEGREES
CALCULATED R AXIS, ECG10: 79 DEGREES
CALCULATED T AXIS, ECG11: -143 DEGREES
CALCULATED T AXIS, ECG11: 83 DEGREES
CASTS URNS QL MICRO: ABNORMAL /LPF
CHLORIDE SERPL-SCNC: 105 MMOL/L (ref 98–107)
CO2 SERPL-SCNC: 22 MMOL/L (ref 21–32)
COLOR UR: YELLOW
CREAT SERPL-MCNC: 0.62 MG/DL (ref 0.6–1)
DIAGNOSIS, 93000: NORMAL
DIAGNOSIS, 93000: NORMAL
DIFFERENTIAL METHOD BLD: ABNORMAL
EOSINOPHIL # BLD: 0 K/UL (ref 0–0.8)
EOSINOPHIL NFR BLD: 1 % (ref 0.5–7.8)
EPI CELLS #/AREA URNS HPF: ABNORMAL /HPF
ERYTHROCYTE [DISTWIDTH] IN BLOOD BY AUTOMATED COUNT: 15.1 % (ref 11.9–14.6)
GLUCOSE SERPL-MCNC: 96 MG/DL (ref 65–100)
GLUCOSE UR STRIP.AUTO-MCNC: NEGATIVE MG/DL
HCT VFR BLD AUTO: 26.1 % (ref 35.8–46.3)
HGB BLD-MCNC: 8.6 G/DL (ref 11.7–15.4)
HGB UR QL STRIP: NEGATIVE
IMM GRANULOCYTES # BLD AUTO: 0 K/UL (ref 0–0.5)
IMM GRANULOCYTES NFR BLD AUTO: 0 % (ref 0–5)
KETONES UR QL STRIP.AUTO: 15 MG/DL
LEUKOCYTE ESTERASE UR QL STRIP.AUTO: ABNORMAL
LYMPHOCYTES # BLD: 0.8 K/UL (ref 0.5–4.6)
LYMPHOCYTES NFR BLD: 10 % (ref 13–44)
MCH RBC QN AUTO: 29.1 PG (ref 26.1–32.9)
MCHC RBC AUTO-ENTMCNC: 33 G/DL (ref 31.4–35)
MCV RBC AUTO: 88.2 FL (ref 79.6–97.8)
MONOCYTES # BLD: 0.7 K/UL (ref 0.1–1.3)
MONOCYTES NFR BLD: 9 % (ref 4–12)
NEUTS SEG # BLD: 6.6 K/UL (ref 1.7–8.2)
NEUTS SEG NFR BLD: 80 % (ref 43–78)
NITRITE UR QL STRIP.AUTO: NEGATIVE
NRBC # BLD: 0 K/UL (ref 0–0.2)
P-R INTERVAL, ECG05: 132 MS
P-R INTERVAL, ECG05: 154 MS
PH UR STRIP: 5.5 [PH] (ref 5–9)
PLATELET # BLD AUTO: 273 K/UL (ref 150–450)
PMV BLD AUTO: 10 FL (ref 9.4–12.3)
POTASSIUM SERPL-SCNC: 3.4 MMOL/L (ref 3.5–5.1)
PROT UR STRIP-MCNC: NEGATIVE MG/DL
Q-T INTERVAL, ECG07: 320 MS
Q-T INTERVAL, ECG07: 346 MS
QRS DURATION, ECG06: 82 MS
QRS DURATION, ECG06: 88 MS
QTC CALCULATION (BEZET), ECG08: 393 MS
QTC CALCULATION (BEZET), ECG08: 430 MS
RBC # BLD AUTO: 2.96 M/UL (ref 4.05–5.2)
RBC #/AREA URNS HPF: ABNORMAL /HPF
SODIUM SERPL-SCNC: 136 MMOL/L (ref 136–145)
SP GR UR REFRACTOMETRY: 1.02 (ref 1–1.02)
TROPONIN I SERPL-MCNC: 0.16 NG/ML (ref 0.02–0.05)
TROPONIN I SERPL-MCNC: 0.21 NG/ML (ref 0.02–0.05)
TROPONIN I SERPL-MCNC: 0.25 NG/ML (ref 0.02–0.05)
UROBILINOGEN UR QL STRIP.AUTO: 0.2 EU/DL (ref 0.2–1)
VENTRICULAR RATE, ECG03: 91 BPM
VENTRICULAR RATE, ECG03: 93 BPM
WBC # BLD AUTO: 8.2 K/UL (ref 4.3–11.1)
WBC URNS QL MICRO: ABNORMAL /HPF

## 2019-06-07 PROCEDURE — 77030013140 HC MSK NEB VYRM -A

## 2019-06-07 PROCEDURE — 74011250637 HC RX REV CODE- 250/637: Performed by: INTERNAL MEDICINE

## 2019-06-07 PROCEDURE — 94640 AIRWAY INHALATION TREATMENT: CPT

## 2019-06-07 PROCEDURE — 99218 HC RM OBSERVATION: CPT

## 2019-06-07 PROCEDURE — 74011000258 HC RX REV CODE- 258: Performed by: INTERNAL MEDICINE

## 2019-06-07 PROCEDURE — 74011250636 HC RX REV CODE- 250/636: Performed by: INTERNAL MEDICINE

## 2019-06-07 PROCEDURE — 74011250636 HC RX REV CODE- 250/636: Performed by: NURSE PRACTITIONER

## 2019-06-07 PROCEDURE — 81001 URINALYSIS AUTO W/SCOPE: CPT

## 2019-06-07 PROCEDURE — 84484 ASSAY OF TROPONIN QUANT: CPT

## 2019-06-07 PROCEDURE — 65660000000 HC RM CCU STEPDOWN

## 2019-06-07 PROCEDURE — 74011250637 HC RX REV CODE- 250/637: Performed by: NURSE PRACTITIONER

## 2019-06-07 PROCEDURE — 74011000250 HC RX REV CODE- 250: Performed by: INTERNAL MEDICINE

## 2019-06-07 PROCEDURE — 36415 COLL VENOUS BLD VENIPUNCTURE: CPT

## 2019-06-07 PROCEDURE — 74011636320 HC RX REV CODE- 636/320: Performed by: INTERNAL MEDICINE

## 2019-06-07 PROCEDURE — 71260 CT THORAX DX C+: CPT

## 2019-06-07 PROCEDURE — 85025 COMPLETE CBC W/AUTO DIFF WBC: CPT

## 2019-06-07 PROCEDURE — 77030020263 HC SOL INJ SOD CL0.9% LFCR 1000ML

## 2019-06-07 PROCEDURE — 93308 TTE F-UP OR LMTD: CPT

## 2019-06-07 PROCEDURE — 94760 N-INVAS EAR/PLS OXIMETRY 1: CPT

## 2019-06-07 PROCEDURE — 77010033678 HC OXYGEN DAILY

## 2019-06-07 PROCEDURE — 80048 BASIC METABOLIC PNL TOTAL CA: CPT

## 2019-06-07 RX ORDER — HYDROCODONE BITARTRATE AND ACETAMINOPHEN 5; 325 MG/1; MG/1
1 TABLET ORAL
Status: DISCONTINUED | OUTPATIENT
Start: 2019-06-07 | End: 2019-06-08 | Stop reason: HOSPADM

## 2019-06-07 RX ORDER — ALBUTEROL SULFATE 0.83 MG/ML
2.5 SOLUTION RESPIRATORY (INHALATION)
Status: DISCONTINUED | OUTPATIENT
Start: 2019-06-07 | End: 2019-06-08 | Stop reason: HOSPADM

## 2019-06-07 RX ORDER — LISINOPRIL 5 MG/1
10 TABLET ORAL DAILY
Status: DISCONTINUED | OUTPATIENT
Start: 2019-06-07 | End: 2019-06-07

## 2019-06-07 RX ORDER — SODIUM CHLORIDE 0.9 % (FLUSH) 0.9 %
5-40 SYRINGE (ML) INJECTION AS NEEDED
Status: DISCONTINUED | OUTPATIENT
Start: 2019-06-07 | End: 2019-06-08 | Stop reason: HOSPADM

## 2019-06-07 RX ORDER — MORPHINE SULFATE 2 MG/ML
2 INJECTION, SOLUTION INTRAMUSCULAR; INTRAVENOUS
Status: DISCONTINUED | OUTPATIENT
Start: 2019-06-07 | End: 2019-06-08 | Stop reason: HOSPADM

## 2019-06-07 RX ORDER — ATORVASTATIN CALCIUM 40 MG/1
80 TABLET, FILM COATED ORAL DAILY
Status: DISCONTINUED | OUTPATIENT
Start: 2019-06-07 | End: 2019-06-07

## 2019-06-07 RX ORDER — BUDESONIDE 0.5 MG/2ML
500 INHALANT ORAL
Status: DISCONTINUED | OUTPATIENT
Start: 2019-06-07 | End: 2019-06-08 | Stop reason: HOSPADM

## 2019-06-07 RX ORDER — ASPIRIN 81 MG/1
81 TABLET ORAL DAILY
Status: DISCONTINUED | OUTPATIENT
Start: 2019-06-07 | End: 2019-06-08 | Stop reason: HOSPADM

## 2019-06-07 RX ORDER — ISOSORBIDE MONONITRATE 30 MG/1
15 TABLET, EXTENDED RELEASE ORAL DAILY
Status: DISCONTINUED | OUTPATIENT
Start: 2019-06-08 | End: 2019-06-08 | Stop reason: HOSPADM

## 2019-06-07 RX ORDER — MONTELUKAST SODIUM 10 MG/1
10 TABLET ORAL DAILY
Status: DISCONTINUED | OUTPATIENT
Start: 2019-06-07 | End: 2019-06-08 | Stop reason: HOSPADM

## 2019-06-07 RX ORDER — HEPARIN SODIUM 5000 [USP'U]/ML
5000 INJECTION, SOLUTION INTRAVENOUS; SUBCUTANEOUS EVERY 8 HOURS
Status: DISCONTINUED | OUTPATIENT
Start: 2019-06-07 | End: 2019-06-08 | Stop reason: HOSPADM

## 2019-06-07 RX ORDER — CARVEDILOL 3.12 MG/1
3.12 TABLET ORAL 2 TIMES DAILY WITH MEALS
Status: DISCONTINUED | OUTPATIENT
Start: 2019-06-07 | End: 2019-06-07

## 2019-06-07 RX ORDER — KETOROLAC TROMETHAMINE 15 MG/ML
15 INJECTION, SOLUTION INTRAMUSCULAR; INTRAVENOUS
Status: COMPLETED | OUTPATIENT
Start: 2019-06-07 | End: 2019-06-07

## 2019-06-07 RX ORDER — ACETAMINOPHEN 325 MG/1
650 TABLET ORAL
Status: DISCONTINUED | OUTPATIENT
Start: 2019-06-07 | End: 2019-06-08 | Stop reason: HOSPADM

## 2019-06-07 RX ORDER — HYDRALAZINE HYDROCHLORIDE 20 MG/ML
10 INJECTION INTRAMUSCULAR; INTRAVENOUS
Status: DISCONTINUED | OUTPATIENT
Start: 2019-06-07 | End: 2019-06-08 | Stop reason: HOSPADM

## 2019-06-07 RX ORDER — CLOPIDOGREL BISULFATE 75 MG/1
75 TABLET ORAL DAILY
Status: DISCONTINUED | OUTPATIENT
Start: 2019-06-07 | End: 2019-06-08 | Stop reason: HOSPADM

## 2019-06-07 RX ORDER — CARVEDILOL 6.25 MG/1
6.25 TABLET ORAL 2 TIMES DAILY WITH MEALS
Status: DISCONTINUED | OUTPATIENT
Start: 2019-06-07 | End: 2019-06-07

## 2019-06-07 RX ORDER — SODIUM CHLORIDE 0.9 % (FLUSH) 0.9 %
10 SYRINGE (ML) INJECTION
Status: COMPLETED | OUTPATIENT
Start: 2019-06-07 | End: 2019-06-07

## 2019-06-07 RX ORDER — PANTOPRAZOLE SODIUM 40 MG/1
40 TABLET, DELAYED RELEASE ORAL 2 TIMES DAILY
Status: DISCONTINUED | OUTPATIENT
Start: 2019-06-07 | End: 2019-06-08 | Stop reason: HOSPADM

## 2019-06-07 RX ORDER — SODIUM CHLORIDE 0.9 % (FLUSH) 0.9 %
5-40 SYRINGE (ML) INJECTION EVERY 8 HOURS
Status: DISCONTINUED | OUTPATIENT
Start: 2019-06-07 | End: 2019-06-08 | Stop reason: HOSPADM

## 2019-06-07 RX ORDER — CARVEDILOL 6.25 MG/1
TABLET ORAL
Status: ACTIVE
Start: 2019-06-07 | End: 2019-06-07

## 2019-06-07 RX ORDER — NITROGLYCERIN 0.4 MG/1
0.4 TABLET SUBLINGUAL
Status: DISCONTINUED | OUTPATIENT
Start: 2019-06-07 | End: 2019-06-08 | Stop reason: HOSPADM

## 2019-06-07 RX ORDER — ATORVASTATIN CALCIUM 40 MG/1
80 TABLET, FILM COATED ORAL
Status: DISCONTINUED | OUTPATIENT
Start: 2019-06-07 | End: 2019-06-08 | Stop reason: HOSPADM

## 2019-06-07 RX ORDER — CARVEDILOL 6.25 MG/1
6.25 TABLET ORAL 2 TIMES DAILY WITH MEALS
Status: DISCONTINUED | OUTPATIENT
Start: 2019-06-08 | End: 2019-06-08 | Stop reason: HOSPADM

## 2019-06-07 RX ORDER — ISOSORBIDE MONONITRATE 30 MG/1
30 TABLET, EXTENDED RELEASE ORAL DAILY
Status: DISCONTINUED | OUTPATIENT
Start: 2019-06-07 | End: 2019-06-07

## 2019-06-07 RX ORDER — SODIUM CHLORIDE 9 MG/ML
50 INJECTION, SOLUTION INTRAVENOUS CONTINUOUS
Status: DISCONTINUED | OUTPATIENT
Start: 2019-06-07 | End: 2019-06-08

## 2019-06-07 RX ORDER — LISINOPRIL 5 MG/1
5 TABLET ORAL DAILY
Status: DISCONTINUED | OUTPATIENT
Start: 2019-06-08 | End: 2019-06-08 | Stop reason: HOSPADM

## 2019-06-07 RX ORDER — LISINOPRIL 5 MG/1
2.5 TABLET ORAL DAILY
Status: DISCONTINUED | OUTPATIENT
Start: 2019-06-07 | End: 2019-06-07

## 2019-06-07 RX ADMIN — CARVEDILOL 6.25 MG: 6.25 TABLET, FILM COATED ORAL at 02:28

## 2019-06-07 RX ADMIN — IOPAMIDOL 100 ML: 755 INJECTION, SOLUTION INTRAVENOUS at 19:34

## 2019-06-07 RX ADMIN — ATORVASTATIN CALCIUM 80 MG: 40 TABLET, FILM COATED ORAL at 22:29

## 2019-06-07 RX ADMIN — Medication 10 ML: at 19:34

## 2019-06-07 RX ADMIN — Medication 5 ML: at 02:29

## 2019-06-07 RX ADMIN — ALBUTEROL SULFATE 2.5 MG: 2.5 SOLUTION RESPIRATORY (INHALATION) at 16:07

## 2019-06-07 RX ADMIN — KETOROLAC TROMETHAMINE 15 MG: 15 INJECTION, SOLUTION INTRAMUSCULAR; INTRAVENOUS at 02:28

## 2019-06-07 RX ADMIN — Medication 5 ML: at 14:37

## 2019-06-07 RX ADMIN — ALBUTEROL SULFATE 2.5 MG: 2.5 SOLUTION RESPIRATORY (INHALATION) at 02:00

## 2019-06-07 RX ADMIN — ACETAMINOPHEN 650 MG: 325 TABLET, FILM COATED ORAL at 16:26

## 2019-06-07 RX ADMIN — BUDESONIDE 500 MCG: 0.5 INHALANT RESPIRATORY (INHALATION) at 07:01

## 2019-06-07 RX ADMIN — PANTOPRAZOLE SODIUM 40 MG: 40 TABLET, DELAYED RELEASE ORAL at 08:41

## 2019-06-07 RX ADMIN — HYDROCODONE BITARTRATE AND ACETAMINOPHEN 1 TABLET: 5; 325 TABLET ORAL at 10:41

## 2019-06-07 RX ADMIN — CARVEDILOL 6.25 MG: 6.25 TABLET, FILM COATED ORAL at 08:41

## 2019-06-07 RX ADMIN — PANTOPRAZOLE SODIUM 40 MG: 40 TABLET, DELAYED RELEASE ORAL at 17:27

## 2019-06-07 RX ADMIN — ISOSORBIDE MONONITRATE 30 MG: 30 TABLET, EXTENDED RELEASE ORAL at 08:40

## 2019-06-07 RX ADMIN — Medication 10 ML: at 22:29

## 2019-06-07 RX ADMIN — TIOTROPIUM BROMIDE 18 MCG: 18 CAPSULE ORAL; RESPIRATORY (INHALATION) at 07:03

## 2019-06-07 RX ADMIN — SODIUM CHLORIDE 50 ML/HR: 900 INJECTION, SOLUTION INTRAVENOUS at 14:37

## 2019-06-07 RX ADMIN — BUDESONIDE 500 MCG: 0.5 INHALANT RESPIRATORY (INHALATION) at 20:02

## 2019-06-07 RX ADMIN — SODIUM CHLORIDE 100 ML: 900 INJECTION, SOLUTION INTRAVENOUS at 19:34

## 2019-06-07 RX ADMIN — ASPIRIN 81 MG: 81 TABLET, COATED ORAL at 08:41

## 2019-06-07 RX ADMIN — ATORVASTATIN CALCIUM 80 MG: 40 TABLET, FILM COATED ORAL at 02:59

## 2019-06-07 RX ADMIN — LISINOPRIL 10 MG: 5 TABLET ORAL at 08:40

## 2019-06-07 RX ADMIN — ALBUTEROL SULFATE 2.5 MG: 2.5 SOLUTION RESPIRATORY (INHALATION) at 07:01

## 2019-06-07 RX ADMIN — ALBUTEROL SULFATE 2.5 MG: 2.5 SOLUTION RESPIRATORY (INHALATION) at 20:02

## 2019-06-07 RX ADMIN — CLOPIDOGREL BISULFATE 75 MG: 75 TABLET ORAL at 08:40

## 2019-06-07 NOTE — ED NOTES
TRANSFER - OUT REPORT:    Verbal report given to Radha Moore RN on [address]  being transferred to [facility] for routine progression of care       Report consisted of patients Situation, Background, Assessment and   Recommendations(SBAR). Information from the following report(s) SBAR, ED Summary, [facility] and Recent Results was reviewed with the receiving nurse. Lines:   Peripheral IV 06/06/19 Left Forearm (Active)   Site Assessment Clean, dry, & intact 6/6/2019  8:33 PM   Phlebitis Assessment 0 6/6/2019  8:33 PM   Infiltration Assessment 0 6/6/2019  8:33 PM   Dressing Status Clean, dry, & intact 6/6/2019  8:33 PM        Opportunity for questions and clarification was provided.       Patient transported with:   Monitor  O2 @ 2 liters  Registered Nurse

## 2019-06-07 NOTE — PROGRESS NOTES
Respiratory Care Services     Policy Number: -[id number]    Title: Oxygen Protocol    Effective Date: 01/1996    Revised Date: 06/2013, 02/29/2016    Reviewed Date: 05/2014, 03/2015, 06/2017     I. Policy: The Oxygen Protocol will be initiated for all patients upon written order from physician for administration of oxygen therapy or if patient is found to have an oxygen saturation of 88% or less. II. Purpose: To provide protocol driven respiratory therapy for the administration of oxygen at concentrations greater than that in ambient air with the intent of treating or preventing the symptoms and manifestations of hypoxia. III. Responsibility: Director Respiratory Care Services, all Respiratory Care Practitioners     IV. Indications:   A. Implement this protocol for all patients when physician orders oxygen to be administered or when patient is found to have an oxygen saturation of 88% or less. B. To assure routine monitoring of patient's oxygen saturation b.i.d. and to make appropriate adjustments in accordance with ordered oxygen saturation parameters. C. To assure continuity of respiratory care that meets [facility] Clinical Practice Guidelines. V. Assessment:  Assess the following parameters to determine the need to adjust oxygen:  A. Measurement of patient's oxygen saturation via pulse oximetry. B. Observation of patient's color, respiratory effort, and responsiveness. C. Measurement of heart rate and respiratory rate. VI. Initiation:  Upon receipt of an order for oxygen, the RCP will:   A. Verify order in the patient's EMR, which should include the desired oxygen saturation to be maintained. B. In the event that no saturation is specified, a saturation of 90% will be maintained for all patients with the exception of cardiac patients who will be maintained at 92%. C.  The patient will be placed on oxygen with humidity as ordered by the physician to achieve the prescribed oxygen saturation. D. Patients, who are found to have a SaO2 of 88% or less, may be started on supplemental oxygen as described above. E. The patient will be informed of the \"no smoking policy\" and instructed in the proper use of oxygen therapy. F. Once desired oxygen saturation has been achieved, the P will document FIO2 and oxygen saturation in the respiratory section of the patients EMR. VII. Maintenance:   A. 30-second oxygen saturation check will be taken to maintain the saturation ordered by the physician each day. B. Patients will be assessed each shift by pulse oximetry to determine if oxygen needs to be decreased, increased or discontinued. C. If changes in FIO2 are indicated, all changes will be documented in the respiratory section of the patients EMR. D. If no changes in FIO2 are required, the patient's oxygen flow rate and saturation will be recorded in the respiratory section of the patients EMR. E. Per [facility], patients who are receiving oxygen therapy but are not on oxygen at home, should be weaned off oxygen as soon as possible or when anticipated discharge becomes evident. [place] will be discontinued after oxygen saturation has been maintained for 24 hours on room air and documented in the patients EMR. G. Patients on the Inpatient Rehabilitation area on 9th floor will be exempt from having their oxygen discontinued per protocol. Oxygen may be weaned but will be changed to prn to meet the needs of the patient when exercising and participating in physical therapy. VIII. Safety: [facility] will address the following safety issues:  A. Identify patient using the two patient identifiers name and birth date via ID bracelet. B. Perform hand hygiene per hospital policy utilizing Standard Precautions for all patients and following transmission-based isolation as indicated per hospital policy. C. For cardiac patients, oxygen will be discontinued by order of physician. D.  If a patients FIO2 requirements necessitate changing oxygen delivery devices to a high concentration of oxygen, the ordering physician will be notified. E. If a patient has a hemoglobin level <8 mg. RCP will consult physician before discontinuing oxygen. F. Patients who have an oxygen saturation >95% may be weaned by increments of 10%. G. Patients who have an oxygen saturation <95% may be weaned by increments of 5%. IX. Interventions:   A. RCP will assess patient for signs of respiratory distress or suspicion of CO2 retention. B. An ABG may be obtained for patients exhibiting respiratory distress. C. An order should be entered into patients EMR for ABG under per protocol. X. Documentation  A. Document assessment findings in the respiratory section of the patients EMR. B. Document changes in therapy per protocol in the respiratory orders section and in the care plan section of the patients EMR. C. Document patient education in the patient education section of the patients EMR. XI. Reportable Conditions:  Report to the physician immediately:  A. Acute changes in patient's respiratory status. B. An oxygen saturation <85%. C. A change in oxygen delivery device to provide a high concentration of oxygen. XII. Patient Instructions: Review with Patient  A. Purpose of oxygen therapy  B. Proper technique for using oxygen  C.  No smoking policy    Approval: Pulmonary Committee (1-25-96)  Revision: Chest Committee (4-28-05)

## 2019-06-07 NOTE — H&P
Hospitalist H&P/Consult Note     Admit Date:  2019  6:37 PM   Name:  Lisa Christianson   Age:  70 y.o.  :  1950   MRN:  [id number]   PCP:  Colonel Bishop MD  Treatment Team: Primary Nurse: Rick Flores RN    HPI:   70years old F with PMH of CAD, HTN, anxiety, COPD presented to the hospital complaining of chest pain for 1 day. Patient reported pain is located in the middle of the chest, sharp in nature, radiated to her neck, alleviated by nitro,  not having chest pain at the moment. Patient reported she was recently admitted to the hospital for chest pain few days ago. Patient had stent placement and was discharged home. At home patient had chest pain and returned to the ED on . Patient reported he was discharged from ED but at home was still having symptoms. Patient denies dysuria, cough, rash, sick contacts at home, N/V/D. In the ED patient had temp 100.2. WBC 7.9. PCT <0.1. Troponin was elevated to 0.16. EKG TWI on lateral leads that resolved on repeated EKG. ED physician contacted Cardiology who recommended admission to hospitalist service, no acute intervention recommended. 10 systems reviewed and negative except as noted in HPI. Past Medical History:   Diagnosis Date    CAD (coronary artery disease) , 2013    PCI,  Dayanna Crandall    Chronic anxiety 2017    COPD     Dr. Mitali Vaughn GERD (gastroesophageal reflux disease)     Hypertension     Nausea & vomiting     Thyroid disease       Past Surgical History:   Procedure Laterality Date    CARDIAC SURG PROCEDURE UNLIST  , 13    LAD    HX GYN      rebuilt cervix    HX TONSIL AND ADENOIDECTOMY        Prior to Admission Medications   Prescriptions Last Dose Informant Patient Reported? Taking? albuterol (PROVENTIL HFA, VENTOLIN HFA, PROAIR HFA) 90 mcg/actuation inhaler   No No   Sig: Take 1 Puff by inhalation every four (4) hours as needed for Wheezing.    aspirin delayed-release 81 mg tablet Yes No   Sig: Take  by mouth daily. atorvastatin (LIPITOR) 80 mg tablet   No No   Sig: Take 1 Tab by mouth daily. budesonide-formoterol (SYMBICORT) 160-4.5 mcg/actuation HFAA   Yes No   Sig: Take 2 Puffs by inhalation two (2) times a day. carvedilol (COREG) 3.125 mg tablet   No No   Sig: Take 1 Tab by mouth two (2) times daily (with meals). cetirizine (ZYRTEC) 10 mg tablet   Yes No   Sig: Take  by mouth daily as needed. clopidogrel (PLAVIX) 75 mg tab   No No   Sig: Take 1 Tab by mouth daily. guaiFENesin ER (MUCINEX) 600 mg ER tablet   Yes No   Sig: Take 600 mg by mouth two (2) times daily as needed. lisinopril (PRINIVIL, ZESTRIL) 2.5 mg tablet   No No   Sig: Take 1 Tab by mouth daily. montelukast (SINGULAIR) 10 mg tablet   No No   Sig: Take 1 Tab by mouth daily. multivitamin (ONE A DAY) tablet   Yes No   Sig: Take 1 Tab by mouth daily. nitroglycerin (NITROSTAT) 0.4 mg SL tablet   No No   Si Tab by SubLINGual route every five (5) minutes as needed for Chest Pain.   pantoprazole (PROTONIX) 40 mg tablet   No No   Sig: Take 1 Tab by mouth two (2) times a day. penicillin v potassium (VEETID) 500 mg tablet   Yes No   Sig: Take 1,000 mg by mouth two (2) times a day. tiotropium (SPIRIVA WITH HANDIHALER) 18 mcg inhalation capsule   No No   Sig: Take 1 Cap by inhalation daily. Facility-Administered Medications: None     Allergies   Allergen Reactions    Phenergan [Promethazine] Nausea and Vomiting     Severe vomiting       Social History     Tobacco Use    Smoking status: Former Smoker     Years: 45.00     Types: Cigarettes     Last attempt to quit: 2013     Years since quittin.8    Smokeless tobacco: Never Used   Substance Use Topics    Alcohol use:  Yes     Alcohol/week: 1.8 oz     Types: 1 Glasses of wine, 1 Cans of beer, 1 Shots of liquor per week     Comment: rare      Family History   Problem Relation Age of Onset    Heart Attack Father         Adoptive father   Renee Griggs No Known Problems Mother         adopted      Immunization History   Administered Date(s) Administered    Influenza High Dose Vaccine PF 09/23/2014, 10/08/2015, 10/10/2016, 09/26/2017, 09/25/2018    Influenza Vaccine 10/01/2012, 09/27/2013    Pneumococcal Conjugate (PCV-13) 09/23/2014    Pneumococcal Polysaccharide (PPSV-23) 01/01/2008, 08/30/2018    Tdap 11/07/2018    Zoster Recombinant 04/11/2019       Objective:     Patient Vitals for the past 24 hrs:   Temp Pulse Resp BP SpO2   06/06/19 2231  91 26 161/70 94 %   06/06/19 2208     94 %   06/06/19 2131  87  158/76 93 %   06/06/19 2130 100.2 °F (37.9 °C)       06/06/19 2101  95 21 181/75 94 %   06/06/19 2030  85 30 160/76 95 %   06/06/19 2011  93  166/76    06/06/19 2002     96 %   06/06/19 1841 99.5 °F (37.5 °C) (!) 102 20 (!) 193/91 95 %     Oxygen Therapy  O2 Sat (%): 94 % (06/06/19 2231)  Pulse via Oximetry: 91 beats per minute (06/06/19 2231)  O2 Device: Nasal cannula (06/06/19 2208)  O2 Flow Rate (L/min): 2 l/min (06/06/19 2208)  No intake or output data in the 24 hours ending 06/06/19 2341    Physical Exam:  General:    Well nourished. Alert. Eyes:   Normal sclera. Extraocular movements intact. ENT:  Normocephalic, atraumatic. Moist mucous membranes  CV:   RRR. No murmur, rub, or gallop. Lungs:  CTAB. No wheezing, rhonchi, or rales. Abdomen: Soft, nontender, nondistended. Bowel sounds normal.   Extremities: Warm and dry. No cyanosis or edema. Neurologic: CN II-XII grossly intact. No gross focal deficits  Skin:     No rashes or jaundice. No wounds. Psych:  Normal mood and affect. I reviewed the labs,EKGs and other studies done this admission.   Data Review:   Recent Results (from the past 24 hour(s))   EKG, 12 LEAD, INITIAL    Collection Time: 06/06/19  6:46 PM   Result Value Ref Range    Ventricular Rate 91 BPM    Atrial Rate 91 BPM    P-R Interval 154 ms    QRS Duration 88 ms    Q-T Interval 320 ms    QTC Calculation (Bezet) 393 ms    Calculated P Axis 88 degrees    Calculated R Axis 79 degrees    Calculated T Axis -143 degrees    Diagnosis       !! AGE AND GENDER SPECIFIC ECG ANALYSIS !! Sinus rhythm with Premature atrial complexes  Possible Anterior infarct , age undetermined  ST & T wave abnormality, consider lateral ischemia  Abnormal ECG  When compared with ECG of 02-JUN-2019 19:46,  Premature atrial complexes are now Present  T wave inversion more evident in Lateral leads  QT has lengthened     CBC WITH AUTOMATED DIFF    Collection Time: 06/06/19  6:57 PM   Result Value Ref Range    WBC 7.9 4.3 - 11.1 K/uL    RBC 3.41 (L) 4.05 - 5.2 M/uL    HGB 10.0 (L) 11.7 - 15.4 g/dL    HCT 30.6 (L) 35.8 - 46.3 %    MCV 89.7 79.6 - 97.8 FL    MCH 29.3 26.1 - 32.9 PG    MCHC 32.7 31.4 - 35.0 g/dL    RDW 15.6 (H) 11.9 - 14.6 %    PLATELET 016 992 - 296 K/uL    MPV 9.7 9.4 - 12.3 FL    ABSOLUTE NRBC 0.00 0.0 - 0.2 K/uL    DF AUTOMATED      NEUTROPHILS 79 (H) 43 - 78 %    LYMPHOCYTES 11 (L) 13 - 44 %    MONOCYTES 8 4.0 - 12.0 %    EOSINOPHILS 2 0.5 - 7.8 %    BASOPHILS 0 0.0 - 2.0 %    IMMATURE GRANULOCYTES 0 0.0 - 5.0 %    ABS. NEUTROPHILS 6.2 1.7 - 8.2 K/UL    ABS. LYMPHOCYTES 0.9 0.5 - 4.6 K/UL    ABS. MONOCYTES 0.6 0.1 - 1.3 K/UL    ABS. EOSINOPHILS 0.2 0.0 - 0.8 K/UL    ABS. BASOPHILS 0.0 0.0 - 0.2 K/UL    ABS. IMM. GRANS. 0.0 0.0 - 0.5 K/UL   METABOLIC PANEL, COMPREHENSIVE    Collection Time: 06/06/19  6:57 PM   Result Value Ref Range    Sodium 137 136 - 145 mmol/L    Potassium 4.1 3.5 - 5.1 mmol/L    Chloride 106 98 - 107 mmol/L    CO2 25 21 - 32 mmol/L    Anion gap 6 (L) 7 - 16 mmol/L    Glucose 85 65 - 100 mg/dL    BUN 12 8 - 23 MG/DL    Creatinine 0.63 0.6 - 1.0 MG/DL    GFR est AA >60 >60 ml/min/1.73m2    GFR est non-AA >60 >60 ml/min/1.73m2    Calcium 9.6 8.3 - 10.4 MG/DL    Bilirubin, total 0.7 0.2 - 1.1 MG/DL    ALT (SGPT) 22 12 - 65 U/L    AST (SGOT) 21 15 - 37 U/L    Alk.  phosphatase 69 50 - 136 U/L    Protein, total 8.3 (H) 6.3 - 8.2 g/dL    Albumin 3.5 3.2 - 4.6 g/dL    Globulin 4.8 (H) 2.3 - 3.5 g/dL    A-G Ratio 0.7 (L) 1.2 - 3.5     BNP    Collection Time: 06/06/19  6:57 PM   Result Value Ref Range     (H) 0 pg/mL   PROCALCITONIN    Collection Time: 06/06/19  6:57 PM   Result Value Ref Range    Procalcitonin <0.1 ng/mL   POC TROPONIN-I    Collection Time: 06/06/19  7:02 PM   Result Value Ref Range    Troponin-I (POC) 0.16 (H) 0.02 - 0.05 ng/ml   POC LACTIC ACID    Collection Time: 06/06/19 10:00 PM   Result Value Ref Range    Lactic Acid (POC) 0.93 0.5 - 1.9 mmol/L   POC TROPONIN-I    Collection Time: 06/06/19 10:20 PM   Result Value Ref Range    Troponin-I (POC) 0.17 (H) 0.02 - 0.05 ng/ml   EKG, 12 LEAD, SUBSEQUENT    Collection Time: 06/06/19 10:39 PM   Result Value Ref Range    Ventricular Rate 93 BPM    Atrial Rate 93 BPM    P-R Interval 132 ms    QRS Duration 82 ms    Q-T Interval 346 ms    QTC Calculation (Bezet) 430 ms    Calculated P Axis 69 degrees    Calculated R Axis 67 degrees    Calculated T Axis 83 degrees    Diagnosis       !! AGE AND GENDER SPECIFIC ECG ANALYSIS !! Normal sinus rhythm  Possible Anterior infarct (cited on or before 06-JUN-2019)  Abnormal ECG  When compared with ECG of 06-JUN-2019 18:46,  Premature atrial complexes are no longer Present  Nonspecific T wave abnormality no longer evident in Inferior leads  T wave inversion no longer evident in Anterolateral leads         Imaging Studies:  CXR Results  (Last 48 hours)               06/06/19 1908  XR CHEST PA LAT Final result    Impression:  IMPRESSION: Chronic interstitial changes. No acute abnormality evident. Narrative:  PA AND LATERAL CHEST X-RAY. Clinical Indication: Chest pain       Comparison: Chest x-ray dated 6/2/2019       Findings: 2 views of the chest submitted demonstrate the cardiac silhouette and   mediastinum to be unremarkable. There is no pleural effusion or pneumothorax.    Chronic interstitial changes are noted throughout both lungs unchanged from the   prior exam. No confluent, dense airspace opacity appreciated. No pleural   effusion or pneumothorax. The cardiac silhouette and mediastinum are   unremarkable. CT Results  (Last 48 hours)    None          Assessment and Plan:     Hospital Problems as of 6/6/2019 Date Reviewed: 5/2/2019          Codes Class Noted - Resolved POA    * (Principal) Chest pain ICD-10-CM: R07.9  ICD-9-CM: 786.50  5/31/2019 - Present Unknown        CAD S/P percutaneous coronary angioplasty ICD-10-CM: I25.10, Z98.61  ICD-9-CM: 414.01, V45.82  5/31/2019 - Present Yes        Chronic anxiety ICD-10-CM: F41.9  ICD-9-CM: 300.00  4/27/2017 - Present Yes        Essential hypertension, benign ICD-10-CM: I10  ICD-9-CM: 401.1  10/10/2016 - Present Yes        COPD (chronic obstructive pulmonary disease) (HCC) (Chronic) ICD-10-CM: J44.9  ICD-9-CM: 496  8/20/2013 - Present Yes    Overview Signed 2/5/2014  9:08 AM by Ofelia Noble NP     Spirometry 2/5/14 fvc 2.28L, 67%; fev1 0.90L, 35%; FEV1/FVC 40%; severe obstruction with low FVC                     A/P:    -Chest pain  Slightly elevated troponin  Repeated EKG with resolution of TWI on lateral leads  CXR with no acute disease    Plan  Cont DAPT, statins and BB  Nitro PRN  Trend troponins  Get echocardiogram to evaluate for pericardial effusions  Monitor on telemetry floor  Cardiology evaluation    -elevated temperature  Temp 100.2  WBC 7.9 and PCT <0.1  CXR with no acute disease  Check UA   Hold on abxs for now as not infectious etiology identified so far  Follow up blood cultures    -COPD  No active wheezing  Restart home medications    Code status:Full  DVT ppx: heparin          Signed:   Angel Byrne MD

## 2019-06-07 NOTE — PROGRESS NOTES
Care Management Interventions  PCP Verified by CM: Yes(Last seen May 2019)  Palliative Care Criteria Met (RRAT>21 & CHF Dx)?: No(RRAT 21 Dx ANGELA )  Transition of Care Consult (CM Consult): Discharge Planning  Discharge Durable Medical Equipment: No(none)  Physical Therapy Consult: No  Occupational Therapy Consult: No  Speech Therapy Consult: No  Current Support Network: Lives Alone  Confirm Follow Up Transport: Self  Plan discussed with Pt/Family/Caregiver: Yes  Freedom of Choice Offered: Yes  Discharge Location  Discharge Placement: Home  Met with patient for d/c planning. Patient alert and oriented x 3, independent of ADL's and lives alone. She has 2 children that live in [place], [place] and step son in [place]. She requires no DME and has needed transportation and is able to drive. She has Medicare and [employer] and is able to obtain her medications without difficulty at [facility] on [address]. Current d/c plan is home when medically stable.

## 2019-06-07 NOTE — PROGRESS NOTES
TRANSFER - IN REPORT:    Verbal report received from Simin Batista RN on [address] being received from ED for routine progression of care      Report consisted of patients Situation, Background, Assessment and Recommendations(SBAR). Information from the following report(s) SBAR, [facility], [facility] and Recent Results was reviewed with the receiving nurse. Opportunity for questions and clarification was provided. Assessment completed upon patients arrival to unit and care assumed.

## 2019-06-07 NOTE — PROGRESS NOTES
In accordance with Medicare guidelines, a copy of the Important Letter from Medicare was presented to the patient. She was changed from observation to inpatient. One copy was given to the patient, and a signed copy was placed in the patients chart.

## 2019-06-07 NOTE — PROGRESS NOTES
Initial visit to assess spiritual needs. Pt was alone at time of visit and mention that she had family support during her hospital stay.  assured pt of spiritual support. Kippie Simmie Fleischer, M,Div.

## 2019-06-07 NOTE — ED NOTES
Report given to ALISTAIR MITCHELL [facility], RN and care transferred.

## 2019-06-07 NOTE — PROGRESS NOTES
Patient has complained of headache since Imdur was started. Tylenol nor Norco has relived her pain. Troponins have continued to decrease. Attempting to obtain IV site for CT scan but have been unsuccessful.

## 2019-06-07 NOTE — PROGRESS NOTES
Bedside and verbal report received from [facility], [facility]. Patient off floor for CT.

## 2019-06-07 NOTE — PROGRESS NOTES
Hospitalist Progress Note    2019  Admit Date: 2019  6:37 PM   NAME: Jamal Hubbard   :  1950   MRN:  [id number]   Attending: Mary Carmen Howard MD  PCP:  Kolby Yoder MD    SUBJECTIVE:   Patient is a 69 yo F admitted with CAD s/p recent PCI to RCA, COPD, and hypertension, who was placed in observation for ongoing chest pain and malignant hypertension. Troponin slightly elevated but decreased from recent admission for L heart cath. Cardiology evaluated and increased lisinopril (2.5--> 10 mg), carvedilol (5--> 10 mg), and started Imdur 30 mg. Now BP has dropped to low normal.  She denies significant shortness of breath at present but she states that gets short of breath when she has the chest pain. Chest pain described as sharp and starts under each arm and radiates up back. No further low grade fever and infectious work up negative.       Review of Systems negative with exception of pertinent positives noted above  PHYSICAL EXAM     Visit Vitals  BP 97/48 (BP 1 Location: Left arm, BP Patient Position: At rest;Sitting)   Pulse 82   Temp 98.3 °F (36.8 °C)   Resp 18   Ht 5' 7\" (1.702 m)   Wt 57.7 kg (127 lb 3.2 oz)   SpO2 97%   BMI 19.92 kg/m²      Temp (24hrs), Av.1 °F (37.3 °C), Min:98.3 °F (36.8 °C), Max:100.2 °F (37.9 °C)    Patient Vitals for the past 24 hrs:   Temp Pulse Resp BP SpO2   19 1340 98.3 °F (36.8 °C) 82 18 97/48 97 %   19 0916 98.5 °F (36.9 °C) 82 17 103/53 98 %   19 0704     94 %   19 0538 98.6 °F (37 °C) 79 18 106/59 97 %   19 0200     96 %   19 0106    191/88    19 0105 98.7 °F (37.1 °C) (!) 119 20 (!) 193/104 94 %   19 0043 100.2 °F (37.9 °C) 96 20 153/70 94 %   19 2231  91 26 161/70 94 %   19 2208     94 %   19 2131  87  158/76 93 %   19 2130 100.2 °F (37.9 °C)       19 2101  95 21 181/75 94 %   19  85 30 160/76 95 %   19  166/76    06/06/19 2002     96 %   06/06/19 1841 99.5 °F (37.5 °C) (!) 102 20 (!) 193/91 95 %       Oxygen Therapy  O2 Sat (%): 97 % (06/07/19 1340)  Pulse via Oximetry: 79 beats per minute (06/07/19 0704)  O2 Device: Nasal cannula (06/07/19 1210)  O2 Flow Rate (L/min): 2 l/min (06/07/19 1210)  No intake or output data in the 24 hours ending 06/07/19 1356   General: No acute distress    Lungs:  CTA Bilaterally. Heart:  Regular rate and rhythm,  No murmur, rub, or gallop  Abdomen: Soft, Non distended, Non tender, Positive bowel sounds  Extremities: No cyanosis, clubbing or edema  Neurologic:  No focal deficits    Recent Results (from the past 24 hour(s))   EKG, 12 LEAD, INITIAL    Collection Time: 06/06/19  6:46 PM   Result Value Ref Range    Ventricular Rate 91 BPM    Atrial Rate 91 BPM    P-R Interval 154 ms    QRS Duration 88 ms    Q-T Interval 320 ms    QTC Calculation (Bezet) 393 ms    Calculated P Axis 88 degrees    Calculated R Axis 79 degrees    Calculated T Axis -143 degrees    Diagnosis       !! AGE AND GENDER SPECIFIC ECG ANALYSIS !!   Sinus rhythm with Premature atrial complexes  Possible Anterior infarct , age undetermined  ST & T wave abnormality, consider lateral ischemia  Abnormal ECG  When compared with ECG of 02-JUN-2019 19:46,  Premature atrial complexes are now Present  T wave inversion more evident in Lateral leads  QT has lengthened  Confirmed by Eli Ash MD (), Bella VELASQUEZ ([id number]) on 6/7/2019 7:30:24 AM     CBC WITH AUTOMATED DIFF    Collection Time: 06/06/19  6:57 PM   Result Value Ref Range    WBC 7.9 4.3 - 11.1 K/uL    RBC 3.41 (L) 4.05 - 5.2 M/uL    HGB 10.0 (L) 11.7 - 15.4 g/dL    HCT 30.6 (L) 35.8 - 46.3 %    MCV 89.7 79.6 - 97.8 FL    MCH 29.3 26.1 - 32.9 PG    MCHC 32.7 31.4 - 35.0 g/dL    RDW 15.6 (H) 11.9 - 14.6 %    PLATELET 968 043 - 920 K/uL    MPV 9.7 9.4 - 12.3 FL    ABSOLUTE NRBC 0.00 0.0 - 0.2 K/uL    DF AUTOMATED      NEUTROPHILS 79 (H) 43 - 78 %    LYMPHOCYTES 11 (L) 13 - 44 %    MONOCYTES 8 4.0 - 12.0 %    EOSINOPHILS 2 0.5 - 7.8 %    BASOPHILS 0 0.0 - 2.0 %    IMMATURE GRANULOCYTES 0 0.0 - 5.0 %    ABS. NEUTROPHILS 6.2 1.7 - 8.2 K/UL    ABS. LYMPHOCYTES 0.9 0.5 - 4.6 K/UL    ABS. MONOCYTES 0.6 0.1 - 1.3 K/UL    ABS. EOSINOPHILS 0.2 0.0 - 0.8 K/UL    ABS. BASOPHILS 0.0 0.0 - 0.2 K/UL    ABS. IMM. GRANS. 0.0 0.0 - 0.5 K/UL   METABOLIC PANEL, COMPREHENSIVE    Collection Time: 06/06/19  6:57 PM   Result Value Ref Range    Sodium 137 136 - 145 mmol/L    Potassium 4.1 3.5 - 5.1 mmol/L    Chloride 106 98 - 107 mmol/L    CO2 25 21 - 32 mmol/L    Anion gap 6 (L) 7 - 16 mmol/L    Glucose 85 65 - 100 mg/dL    BUN 12 8 - 23 MG/DL    Creatinine 0.63 0.6 - 1.0 MG/DL    GFR est AA >60 >60 ml/min/1.73m2    GFR est non-AA >60 >60 ml/min/1.73m2    Calcium 9.6 8.3 - 10.4 MG/DL    Bilirubin, total 0.7 0.2 - 1.1 MG/DL    ALT (SGPT) 22 12 - 65 U/L    AST (SGOT) 21 15 - 37 U/L    Alk.  phosphatase 69 50 - 136 U/L    Protein, total 8.3 (H) 6.3 - 8.2 g/dL    Albumin 3.5 3.2 - 4.6 g/dL    Globulin 4.8 (H) 2.3 - 3.5 g/dL    A-G Ratio 0.7 (L) 1.2 - 3.5     BNP    Collection Time: 06/06/19  6:57 PM   Result Value Ref Range     (H) 0 pg/mL   PROCALCITONIN    Collection Time: 06/06/19  6:57 PM   Result Value Ref Range    Procalcitonin <0.1 ng/mL   POC TROPONIN-I    Collection Time: 06/06/19  7:02 PM   Result Value Ref Range    Troponin-I (POC) 0.16 (H) 0.02 - 0.05 ng/ml   POC LACTIC ACID    Collection Time: 06/06/19 10:00 PM   Result Value Ref Range    Lactic Acid (POC) 0.93 0.5 - 1.9 mmol/L   CULTURE, BLOOD    Collection Time: 06/06/19 10:16 PM   Result Value Ref Range    Special Requests: FOREARM      Culture result: NO GROWTH AFTER 8 HOURS     CULTURE, BLOOD    Collection Time: 06/06/19 10:16 PM   Result Value Ref Range    Special Requests: LEFT  FOREARM        Culture result: NO GROWTH AFTER 8 HOURS     POC TROPONIN-I    Collection Time: 06/06/19 10:20 PM   Result Value Ref Range    Troponin-I (POC) 0.17 (H) 0.02 - 0.05 ng/ml   EKG, 12 LEAD, SUBSEQUENT    Collection Time: 06/06/19 10:39 PM   Result Value Ref Range    Ventricular Rate 93 BPM    Atrial Rate 93 BPM    P-R Interval 132 ms    QRS Duration 82 ms    Q-T Interval 346 ms    QTC Calculation (Bezet) 430 ms    Calculated P Axis 69 degrees    Calculated R Axis 67 degrees    Calculated T Axis 83 degrees    Diagnosis       !! AGE AND GENDER SPECIFIC ECG ANALYSIS !!   Normal sinus rhythm  Possible Anterior infarct (cited on or before 06-JUN-2019)  Abnormal ECG  When compared with ECG of 06-JUN-2019 18:46,  Premature atrial complexes are no longer Present  Nonspecific T wave abnormality no longer evident in Inferior leads  T wave inversion no longer evident in Anterolateral leads  Confirmed by [facility]  MD (), Bella VELASQUEZ ([id number]) on 6/7/2019 7:30:27 AM     URINALYSIS W/ RFLX MICROSCOPIC    Collection Time: 06/07/19  1:20 AM   Result Value Ref Range    Color YELLOW      Appearance CLOUDY      Specific gravity 1.016 1.001 - 1.023      pH (UA) 5.5 5.0 - 9.0      Protein NEGATIVE  NEG mg/dL    Glucose NEGATIVE  mg/dL    Ketone 15 (A) NEG mg/dL    Bilirubin NEGATIVE  NEG      Blood NEGATIVE  NEG      Urobilinogen 0.2 0.2 - 1.0 EU/dL    Nitrites NEGATIVE  NEG      Leukocyte Esterase SMALL (A) NEG      WBC 3-5 0 /hpf    RBC 0-3 0 /hpf    Epithelial cells 0-3 0 /hpf    Bacteria 0 0 /hpf    Casts 0-3 0 /lpf   METABOLIC PANEL, BASIC    Collection Time: 06/07/19  4:52 AM   Result Value Ref Range    Sodium 136 136 - 145 mmol/L    Potassium 3.4 (L) 3.5 - 5.1 mmol/L    Chloride 105 98 - 107 mmol/L    CO2 22 21 - 32 mmol/L    Anion gap 9 7 - 16 mmol/L    Glucose 96 65 - 100 mg/dL    BUN 11 8 - 23 MG/DL    Creatinine 0.62 0.6 - 1.0 MG/DL    GFR est AA >60 >60 ml/min/1.73m2    GFR est non-AA >60 >60 ml/min/1.73m2    Calcium 8.4 8.3 - 10.4 MG/DL   CBC WITH AUTOMATED DIFF    Collection Time: 06/07/19  4:52 AM   Result Value Ref Range    WBC 8.2 4.3 - 11.1 K/uL    RBC 2.96 (L) 4.05 - 5.2 M/uL    HGB 8.6 (L) 11.7 - 15.4 g/dL    HCT 26.1 (L) 35.8 - 46.3 %    MCV 88.2 79.6 - 97.8 FL    MCH 29.1 26.1 - 32.9 PG    MCHC 33.0 31.4 - 35.0 g/dL    RDW 15.1 (H) 11.9 - 14.6 %    PLATELET 924 297 - 108 K/uL    MPV 10.0 9.4 - 12.3 FL    ABSOLUTE NRBC 0.00 0.0 - 0.2 K/uL    DF AUTOMATED      NEUTROPHILS 80 (H) 43 - 78 %    LYMPHOCYTES 10 (L) 13 - 44 %    MONOCYTES 9 4.0 - 12.0 %    EOSINOPHILS 1 0.5 - 7.8 %    BASOPHILS 0 0.0 - 2.0 %    IMMATURE GRANULOCYTES 0 0.0 - 5.0 %    ABS. NEUTROPHILS 6.6 1.7 - 8.2 K/UL    ABS. LYMPHOCYTES 0.8 0.5 - 4.6 K/UL    ABS. MONOCYTES 0.7 0.1 - 1.3 K/UL    ABS. EOSINOPHILS 0.0 0.0 - 0.8 K/UL    ABS. BASOPHILS 0.0 0.0 - 0.2 K/UL    ABS. IMM. GRANS. 0.0 0.0 - 0.5 K/UL   TROPONIN I    Collection Time: 19  4:52 AM   Result Value Ref Range    Troponin-I, Qt. 0.25 (HH) 0.02 - 0.05 NG/ML   TROPONIN I    Collection Time: 19 10:46 AM   Result Value Ref Range    Troponin-I, Qt. 0.21 (HH) 0.02 - 0.05 NG/ML     Imaging:    XR CHEST PA LAT   Final Result   IMPRESSION: Chronic interstitial changes. No acute abnormality evident. CT CHEST W CONT    (Results Pending)     Results for orders placed or performed during the hospital encounter of 19   2D ECHO LIMTED ADULT W OR WO CONTR    Narrative    [facility]  [address] [facility], [address] [facility]  [phone]    Transthoracic Echocardiogram  2D and Doppler    Patient: Yeison Cueto  MR #: [id number]  : 1950  Age: 70 years  Gender: Female  Study date: 2019  Account #: [de-identified]  Height: 67 in  Weight: 127 lb  BSA: 1.67 mï¾²  Status:Routine  Location: 304  BP: 106/ 59    Allergies: PROMETHAZINE    Sonographer:  Kyle Smith RDCS  Group:  [facility]  Referring Physician:  Joy Cook, NP  Reading Physician:  Efra Hazel. Vesna Pacheco MD [facility]    INDICATIONS: R/o Pericardial Effusion. PROCEDURE: This was a routine study.  A transthoracic echocardiogram was  performed. The study included limited 2D imaging and limited spectral   Doppler. Image quality was adequate. LEFT VENTRICLE: Systolic function was normal. Ejection fraction was estimated  in the range of 55 % to 60 %. PERICARDIUM: There was no pericardial effusion. SUMMARY:    -  Left ventricle: Systolic function was normal. Ejection fraction was  estimated in the range of 55 % to 60 %. Prepared and signed by    Edwardo Runner. Cleotilde Mcburney, MD [facility]  Signed 07-Jun-2019 12:28:15         ASSESSMENT      Hospital Problems as of 6/7/2019 Date Reviewed: 5/2/2019          Codes Class Noted - Resolved POA    * (Principal) Chest pain ICD-10-CM: R07.9  ICD-9-CM: 786.50  5/31/2019 - Present Unknown        CAD S/P percutaneous coronary angioplasty ICD-10-CM: I25.10, Z98.61  ICD-9-CM: 414.01, V45.82  5/31/2019 - Present Yes        Chronic anxiety ICD-10-CM: F41.9  ICD-9-CM: 300.00  4/27/2017 - Present Yes        Essential hypertension, benign ICD-10-CM: I10  ICD-9-CM: 401.1  10/10/2016 - Present Yes        COPD (chronic obstructive pulmonary disease) ([facility] [address] (Chronic) ICD-10-CM: J44.9  ICD-9-CM: 496  8/20/2013 - Present Yes    Overview Signed 2/5/2014  9:08 AM by Estuardo Rawls NP     Spirometry 2/5/14 fvc 2.28L, 67%; fev1 0.90L, 35%; FEV1/FVC 40%; severe obstruction with low FVC                   Plan:  · Malignant hypertension- improved with adjustment of bp meds, now low. · Hold evening dose of carvedilol  · Reduce lisinopril to 5 mg  · Reduce Imdur to 15 mg for tomorrow. · Chest pain-   · Obtain CT chest to rule out PE given sharp nature associated with shortness of breath and echo with reduce RV function on 5/31. · Imdur per cardiology    Given significant drop in blood pressure with med changes, will change to inpatient as she will require 2 midnight stay for close monitoring. DVT Prophylaxis: Heparin    Signed By: Eulalia Marshall MD     June 7, 2019

## 2019-06-07 NOTE — CONSULTS
[facility] Cardiology Consult                Date of  Admission: 6/6/2019  6:37 PM     Primary Care Physician: Dr. Breann Moore  Primary Cardiologist: Dr. Summer Avendano  Referring Physician: Dr. Mari Caballero Physician: Dr. Kaci Orozco    CC/Reason for consult: chest pain, shortness of breath      Nam Mclaughlin is a 70 y.o. female with past medical history CAD with remote PCI to LAD and recent PCI to [facility], tobacco abuse, COPD, dyslipidemia and HTN who presented to the ER with complaint of chest soreness and shortness of breath. Associated symptoms include radiation into both sides of her neck behind bilateral ears. Patient underwent LHC on 5/31 that showed patent previously placed stent in LAD, occluded left circumflex and 99% stenosis of mRCA that received a 4.0 x 26-mm Resolute [place] ANNITA. The following morning, patient continued to complain of chest soreness prior to being discharged home on 6/1. She returned to the ER on 6/2 with continued complaints of SHOB and chest soreness that continued to become worse despite recent stent placement. Serial cardiac enzymes were found to be 2.97 and 2.61. This was felt to be due to recent LHC and patient was discharged home. She called the office on 6/3 and was instructed to take 400mg Advil to see if this improved her soreness. She reports improvement in soreness but states that it do not help her \"anginal pain\". Tonight upon arrival to the ER, she was found to be significantly hypertensive with BP of 193/91 and . Temp also noted to be mildly elevated with T-max of 100.2. CXR shows chronic interstitial changes without acute abnormalities. Serial cardiac enzymes tonight were noted to be 0.16 and 0.17. She was given topical nitrates and duo-neb treatment while in the ER. She reports improvement with [place] after initial treatment as well. She was admitted to telemetry by hospital medicine.  We have been asked to see patient in consultation regarding continued chest soreness and elevated troponin. Social -- admits to smoking cessation approximately a year ago. Reports smoking 1 pack/week at the time of cessation.      Patient Active Problem List   Diagnosis Code    CAD (coronary artery disease) I25.10    Tobacco abuse Z72.0    History of MI (myocardial infarction) I25.2    Pulmonary hemorrhage R04.89    COPD (chronic obstructive pulmonary disease) ([facility] [address] J44.9    Hypoxemia R09.02    Hyperlipidemia E78.5    Essential hypertension, benign I10    Coronary artery disease involving native coronary artery of native heart without angina pectoris I25.10    Chronic anxiety F41.9    Ventricular ectopy I49.3    Chest pain R07.9    CAD S/P percutaneous coronary angioplasty I25.10, Z98.61       Past Medical History:   Diagnosis Date    CAD (coronary artery disease) 2009, 8/19/2013    PCI,  Javier Christina    Chronic anxiety 4/27/2017    COPD     Dr. Mica Mcleod GERD (gastroesophageal reflux disease)     Hypertension     Nausea & vomiting     Thyroid disease       Past Surgical History:   Procedure Laterality Date    CARDIAC SURG PROCEDURE UNLIST  2009, 8/19/13    LAD    HX GYN      rebuilt cervix    HX TONSIL AND ADENOIDECTOMY       Allergies   Allergen Reactions    Phenergan [Promethazine] Nausea and Vomiting     Severe vomiting       Family History   Problem Relation Age of Onset    Heart Attack Father         Adoptive father    No Known Problems Mother         adopted        Current Facility-Administered Medications   Medication Dose Route Frequency    sodium chloride (NS) flush 5-40 mL  5-40 mL IntraVENous Q8H    sodium chloride (NS) flush 5-40 mL  5-40 mL IntraVENous PRN    nitroglycerin (NITROSTAT) tablet 0.4 mg  0.4 mg SubLINGual Q5MIN PRN    morphine injection 2 mg  2 mg IntraVENous Q4H PRN    acetaminophen (TYLENOL) tablet 650 mg  650 mg Oral Q4H PRN    HYDROcodone-acetaminophen (NORCO) 5-325 mg per tablet 1 Tab  1 Tab Oral Q6H PRN    heparin (porcine) injection 5,000 Units  5,000 Units SubCUTAneous Q8H    albuterol (PROVENTIL VENTOLIN) nebulizer solution 2.5 mg  2.5 mg Nebulization Q6H PRN    aspirin delayed-release tablet 81 mg  81 mg Oral DAILY    atorvastatin (LIPITOR) tablet 80 mg  80 mg Oral DAILY    clopidogrel (PLAVIX) tablet 75 mg  75 mg Oral DAILY    montelukast (SINGULAIR) tablet 10 mg  10 mg Oral DAILY    pantoprazole (PROTONIX) tablet 40 mg  40 mg Oral BID    tiotropium (SPIRIVA) inhalation capsule 18 mcg  1 Cap Inhalation DAILY    budesonide (PULMICORT) 500 mcg/2 ml nebulizer suspension  500 mcg Nebulization BID RT    And    albuterol (PROVENTIL VENTOLIN) nebulizer solution 2.5 mg  2.5 mg Nebulization Q6HWA RT    carvedilol (COREG) tablet 6.25 mg  6.25 mg Oral BID WITH MEALS    lisinopril (PRINIVIL, ZESTRIL) tablet 10 mg  10 mg Oral DAILY    ketorolac (TORADOL) injection 15 mg  15 mg IntraVENous NOW    isosorbide mononitrate ER (IMDUR) tablet 30 mg  30 mg Oral DAILY    hydrALAZINE (APRESOLINE) 20 mg/mL injection 10 mg  10 mg IntraVENous Q6H PRN       Review of Systems   Respiratory: Positive for shortness of breath. Cardiovascular: Positive for chest pain. Gastrointestinal: Negative for nausea and vomiting. Musculoskeletal: Positive for neck pain. Physical Exam  Vitals:    06/06/19 2231 06/07/19 0043 06/07/19 0105 06/07/19 0106   BP: 161/70 153/70 (!) 193/104 191/88   Pulse: 91 96 (!) 119    Resp: 26 20 20    Temp:  100.2 °F (37.9 °C) 98.7 °F (37.1 °C)    SpO2: 94% 94% 94%    Weight:    57.7 kg (127 lb 3.2 oz)   Height:    5' 7\" (1.702 m)       Physical Exam:  Physical Exam   Constitutional: She is oriented to person, place, and time and well-developed, well-nourished, and in no distress. Cardiovascular: Regular rhythm. Pulmonary/Chest: She has no wheezes. + tachypnea. Does not appear to be moving much air from mid lungs down bilaterally. Abdominal: Soft. Bowel sounds are normal.   Musculoskeletal: Normal range of motion. Neurological: She is alert and oriented to person, place, and time. Skin: Skin is warm and dry. Psychiatric: Affect normal.       Cardiographics    Telemetry: normal sinus rhythm  ECG: normal EKG, normal sinus rhythm, unchanged from previous tracings  Echocardiogram: limited echo ordered for AM    Labs:   Recent Labs     06/06/19  1857      K 4.1   BUN 12   CREA 0.63   GLU 85   WBC 7.9   HGB 10.0*   HCT 30.6*           Assessment/Plan:     Assessment:        Chest pain -- soreness possibly due to recent coronary stretching with previous stent. Give 15mg IV toradol now. Not consistent with angina pain. No significant improvement after stenting. Start Imdur 30mg in the AM. No indication for repeat LHC at this time. COPD (chronic obstructive pulmonary disease) -- has not been seen by Dr. Juli Kincaid in many years. Schedule duo-neb treatment. Consider pulmonary consult this admission for outpatient planning. Overview: Spirometry 2/5/14 fvc 2.28L, 67%; fev1 0.90L, 35%; FEV1/FVC 40%; severe obstruction with low FVC          Essential hypertension, benign -- uncontrolled on this admission. Will increased Coreg to 6.25mg BID and lisinopril to 10mg daily. Add IV hydralazine 10mg Q6prn for Bp control. Monitor BP closely. Titrate medications as needed. Chronic anxiety -- per primary team      CAD S/P percutaneous coronary angioplasty -- recent LHC on 5/31 showed patent previous LAD stent, occluded left circumflex, and 99% stenosis mRCA with stent placement. Continue ASA, Plavix, BB, ACEi and statin. Thank you very much for this referral. We appreciate the opportunity to participate in this patient's care. We will follow along with above stated plan.     Allie Garcia NP  Consulting MD: Tammy Phillips

## 2019-06-08 VITALS
HEART RATE: 75 BPM | WEIGHT: 131 LBS | RESPIRATION RATE: 17 BRPM | BODY MASS INDEX: 20.56 KG/M2 | OXYGEN SATURATION: 98 % | TEMPERATURE: 97.5 F | HEIGHT: 67 IN | DIASTOLIC BLOOD PRESSURE: 69 MMHG | SYSTOLIC BLOOD PRESSURE: 118 MMHG

## 2019-06-08 PROBLEM — I16.0 HYPERTENSIVE URGENCY: Status: ACTIVE | Noted: 2019-06-08

## 2019-06-08 PROBLEM — J96.11 CHRONIC RESPIRATORY FAILURE WITH HYPOXIA (HCC): Status: ACTIVE | Noted: 2019-06-08

## 2019-06-08 LAB
ANION GAP SERPL CALC-SCNC: 7 MMOL/L (ref 7–16)
BUN SERPL-MCNC: 12 MG/DL (ref 8–23)
CALCIUM SERPL-MCNC: 8.7 MG/DL (ref 8.3–10.4)
CHLORIDE SERPL-SCNC: 107 MMOL/L (ref 98–107)
CO2 SERPL-SCNC: 23 MMOL/L (ref 21–32)
CREAT SERPL-MCNC: 0.62 MG/DL (ref 0.6–1)
GLUCOSE SERPL-MCNC: 85 MG/DL (ref 65–100)
POTASSIUM SERPL-SCNC: 3.3 MMOL/L (ref 3.5–5.1)
SODIUM SERPL-SCNC: 137 MMOL/L (ref 136–145)

## 2019-06-08 PROCEDURE — 94760 N-INVAS EAR/PLS OXIMETRY 1: CPT

## 2019-06-08 PROCEDURE — 94640 AIRWAY INHALATION TREATMENT: CPT

## 2019-06-08 PROCEDURE — 36415 COLL VENOUS BLD VENIPUNCTURE: CPT

## 2019-06-08 PROCEDURE — 77010033678 HC OXYGEN DAILY

## 2019-06-08 PROCEDURE — 99222 1ST HOSP IP/OBS MODERATE 55: CPT | Performed by: INTERNAL MEDICINE

## 2019-06-08 PROCEDURE — 74011000250 HC RX REV CODE- 250: Performed by: INTERNAL MEDICINE

## 2019-06-08 PROCEDURE — 74011250637 HC RX REV CODE- 250/637: Performed by: INTERNAL MEDICINE

## 2019-06-08 PROCEDURE — 80048 BASIC METABOLIC PNL TOTAL CA: CPT

## 2019-06-08 RX ORDER — CARVEDILOL 6.25 MG/1
6.25 TABLET ORAL 2 TIMES DAILY WITH MEALS
Qty: 180 TAB | Refills: 0 | Status: SHIPPED | OUTPATIENT
Start: 2019-06-08 | End: 2019-06-20

## 2019-06-08 RX ORDER — ALBUTEROL SULFATE 0.83 MG/ML
2.5 SOLUTION RESPIRATORY (INHALATION)
Qty: 120 EACH | Refills: 1 | Status: SHIPPED | OUTPATIENT
Start: 2019-06-08 | End: 2020-09-08 | Stop reason: SDUPTHER

## 2019-06-08 RX ORDER — ISOSORBIDE MONONITRATE 30 MG/1
15 TABLET, EXTENDED RELEASE ORAL DAILY
Qty: 30 TAB | Refills: 0 | Status: SHIPPED | OUTPATIENT
Start: 2019-06-09 | End: 2019-06-20

## 2019-06-08 RX ADMIN — BUDESONIDE 500 MCG: 0.5 INHALANT RESPIRATORY (INHALATION) at 07:14

## 2019-06-08 RX ADMIN — ASPIRIN 81 MG: 81 TABLET, COATED ORAL at 08:16

## 2019-06-08 RX ADMIN — CLOPIDOGREL BISULFATE 75 MG: 75 TABLET ORAL at 08:16

## 2019-06-08 RX ADMIN — Medication 5 ML: at 06:15

## 2019-06-08 RX ADMIN — ALBUTEROL SULFATE 2.5 MG: 2.5 SOLUTION RESPIRATORY (INHALATION) at 07:13

## 2019-06-08 RX ADMIN — CARVEDILOL 6.25 MG: 6.25 TABLET, FILM COATED ORAL at 08:16

## 2019-06-08 RX ADMIN — ACETAMINOPHEN 650 MG: 325 TABLET, FILM COATED ORAL at 00:59

## 2019-06-08 RX ADMIN — LISINOPRIL 5 MG: 5 TABLET ORAL at 08:17

## 2019-06-08 RX ADMIN — PANTOPRAZOLE SODIUM 40 MG: 40 TABLET, DELAYED RELEASE ORAL at 08:16

## 2019-06-08 RX ADMIN — TIOTROPIUM BROMIDE 18 MCG: 18 CAPSULE ORAL; RESPIRATORY (INHALATION) at 07:14

## 2019-06-08 RX ADMIN — MONTELUKAST SODIUM 10 MG: 10 TABLET, FILM COATED ORAL at 08:16

## 2019-06-08 RX ADMIN — ISOSORBIDE MONONITRATE 15 MG: 30 TABLET, EXTENDED RELEASE ORAL at 08:16

## 2019-06-08 NOTE — DISCHARGE INSTRUCTIONS
Patient Education        Learning About Emphysema  What is emphysema? Emphysema is damage to the air sacs in your lungs. In a healthy person, the tiny air sacs in the lungs are like balloons. As you breathe in and out, they get bigger and smaller to move air through your lungs. With emphysema, these air sacs lose their stretch. Less oxygen gets into your blood and you feel short of breath. Emphysema is a form of COPD (chronic obstructive pulmonary disease). Emphysema is usually caused by smoking. But chemical fumes, dust, or air pollution also can cause it over time. People who get it in their 35s or 45s may have a disorder that runs in families, called alpha-1 antitrypsin deficiency. But this is rare. What can you expect when you have emphysema? Emphysema gets worse over time. You cannot undo the damage to your lungs. Over time, you may find that:  · You get short of breath even when you do simple things like get dressed or fix a meal.  · It is hard to eat or exercise. · You lose weight and feel weaker. But there are things you can do to prevent more damage and feel better. What are the symptoms? The main symptoms of emphysema are:  · A cough that will not go away. · Mucus that comes up when you cough. · Shortness of breath that gets worse when you exercise. At times, your symptoms may suddenly flare up and get much worse. This is a called an exacerbation (say \"egg-ELVIS--BAY-ciera\"). When this happens, your usual symptoms quickly get worse and stay bad. This can be dangerous. You may have to go to the hospital.  How can you keep emphysema from getting worse? Don't smoke. That is the best way to keep emphysema from getting worse. If you already smoke, it is never too late to stop. If you need help quitting, talk to your doctor about stop-smoking programs and medicines. These can increase your chances of quitting for good.   You can do other things to keep emphysema from getting worse:  · Avoid bad air. Air pollution, chemical fumes, and dust also can make emphysema worse. · Get a flu shot every year. A shot may keep the flu from turning into something more serious, like pneumonia. A flu shot also may lower your chances of having a flare-up. · Get a pneumococcal shot. A shot can prevent some of the serious complications of pneumonia. Ask your doctor how often you should get this shot. How is emphysema treated? Emphysema is treated with medicines and oxygen. You also can take steps at home to stay healthy and keep your condition from getting worse. Medicines and oxygen therapy  · You may be taking medicines such as:  ? Bronchodilators. These help open your airways and make breathing easier. Bronchodilators are either short-acting (work for 6 to 9 hours) or long-acting (work for 24 hours). You inhale most bronchodilators, so they start to act quickly. Always carry your quick-relief inhaler with you in case you need it while you are away from home. ? Corticosteroids. These reduce airway inflammation. They come in pill or inhaled form. You must take these medicines every day for them to work well. ? Antibiotics. These medicines are used when you have a bacterial lung infection. · Take your medicines exactly as prescribed. Call your doctor if you think you are having a problem with your medicine. · Oxygen therapy boosts the amount of oxygen in your blood and helps you breathe easier. Use the flow rate your doctor has recommended, and do not change it without talking to your doctor first.  Other care at home  · If your doctor recommends it, get more exercise. Walking is a good choice. Bit by bit, increase the amount you walk every day. Try for at least 30 minutes on most days of the week. · Learn breathing methods--such as breathing through pursed lips--to help you become less short of breath.   · If your doctor has not set you up with a pulmonary rehabilitation program, talk to him or her about whether rehab is right for you. Rehab includes exercise programs, education about your disease and how to manage it, help with diet and other changes, and emotional support. · Eat regular, healthy meals. Use bronchodilators about 1 hour before you eat to make it easier to eat. Eat several small meals instead of three large ones. Drink beverages at the end of the meal. Avoid foods that are hard to chew. Follow-up care is a key part of your treatment and safety. Be sure to make and go to all appointments, and call your doctor if you are having problems. It's also a good idea to know your test results and keep a list of the medicines you take. Where can you learn more? Go to http://taeQuantiaMDedel.info/. Enter F664 in the search box to learn more about \"Learning About Emphysema. \"  Current as of: September 5, 2018  Content Version: 11.9  © 9616-4820 [employer]. Care instructions adapted under license by [employer] (which disclaims liability or warranty for this information). If you have questions about a medical condition or this instruction, always ask your healthcare professional. [place] [address] any warranty or liability for your use of this information. Patient Education        Oxygen Therapy: Care Instructions  Your Care Instructions    Oxygen therapy helps you get more oxygen into your lungs and bloodstream. You may use it if you have a disease that makes it hard to breathe, such as COPD, pulmonary fibrosis (scarring of the lungs), or heart failure. Oxygen therapy can make it easier for you to breathe and can reduce your heart's workload. Some people need extra oxygen all the time. Others need it from time to time throughout the day or overnight. A doctor will prescribe how much oxygen you need and how often to use it. To breathe the oxygen, most people use a nasal cannula (say \"SYLVIA-yuh-bárbara\").  This is a thin tube with two prongs that fit just inside your nose. People who need a lot of oxygen may need to use a mask that fits over the nose and mouth. Follow-up care is a key part of your treatment and safety. Be sure to make and go to all appointments, and call your doctor if you are having problems. It's also a good idea to know your test results and keep a list of the medicines you take. How can you care for yourself at home? To help yourself  · Using oxygen may dry out your nose or lips. Use water-based lubricants on your lips or nostrils. Do not use an oil-based product like petroleum jelly. · If you use a nasal cannula, the tubing may rub under your nostrils and around your ears. To keep your skin from getting sore, tuck some gauze under the tubing. Use a water-based lotion on rubbed areas. · Do not use alcohol or take drugs that relax you, because they will slow your breathing rate. · Keep track of how much oxygen is in the tank, and reorder before it runs out. If a holiday is coming up or you expect bad weather, order in advance or make your regular order larger. · You may need extra oxygen when you travel to high altitudes or travel by plane. Ask your doctor about this. · If you are getting oxygen directly to your windpipe through an opening in your neck, your doctor will teach you how to care for the equipment. To make sure oxygen is flowing  · Check the flow by holding your mask or cannula up to your ear and listening for the \"hiss\" of airflow. · If you have a nasal cannula, dip the prongs in a glass of water. If you see bubbles, oxygen is coming through. · Check your pressure gauge or contents indicator. · If you use an oxygen concentrator, make sure it is turned on and plugged in. If you use a cylinder, make sure the valve is open. · Look for kinks, blockages, or water in the tubing. Be sure the tubing is connected to the oxygen source. · Do not change your oxygen flow rate. Your doctor sets this at the correct level.  Higher flow rates usually do not help and can increase the risk of harmful carbon dioxide buildup in the blood. To be safe  · Do not leave cords or tubing running across an area where you or someone else may trip on it. · Do not let oxygen containers get hot. Store them in a cool place where there is airflow. Do not leave them in a car trunk or a hot vehicle. · Keep oxygen containers upright. Make sure they do not fall over and get damaged. Try securing the tanks in a sturdy container or securing them with a rope or a chain. · Watch for signs of oxygen leaks. If you hear a loud hissing from your container or if it empties too fast, stay away from the container. Open windows right away and call the company that brought the oxygen system to your home. · Do not use oxygen around anything that could spark or easily cause a fire. ? Do not smoke or let others smoke while you are using oxygen. Put up \"no smoking\" signs in your home. ? Do not use oxygen near open flames, such as candles, fireplaces, gas stoves, or hot water heaters. Do not use it near electric razors, hair dryers, heating pads, or anything that may spark. ? Keep a working fire extinguisher in your home where it is easy to get to.  ? If a fire starts, turn off the oxygen right away and leave the house. ? If you have an oxygen concentrator, do not use it if the cord looks damaged. Do not use an extension cord to plug it in. Do not plug it into an outlet that has other appliances plugged into it. To care for the equipment  · Follow the directions that come with the equipment for using and caring for it. · Wash your cannula or mask with a liquid soap and warm water 1 or 2 times a week. Replace them every 2 to 4 weeks. · If you have a cold, change the nasal prongs when your cold symptoms are done. · If you have an oxygen concentrator, unplug the unit and wipe down the cabinet with a damp cloth daily. Clean the air filter at least 2 times a week.   Where can you learn more?  Go to http://tae-edel.info/. Enter E117 in the search box to learn more about \"Oxygen Therapy: Care Instructions. \"  Current as of: September 5, 2018  Content Version: 11.9  © 0358-9844 [employer]. Care instructions adapted under license by [employer] (which disclaims liability or warranty for this information). If you have questions about a medical condition or this instruction, always ask your healthcare professional. [place] [address] any warranty or liability for your use of this information. Patient Education   Patient Education      Isosorbide Mononitrate (Imdur, Imdur ER, Ismo) - (By mouth)   Why this medicine is used:   Prevents angina. Contact a nurse or doctor right away if you have:  · Severe or ongoing dizziness, lightheadedness, fainting  · Slow heartbeat, new or increased chest pain     Common side effects:  · Mild dizziness, lightheadedness  · Headache  · Feeling of warmth, redness of the face, neck, arms, and upper chest  © 2017 [facility] Information is for End User's use only and may not be sold, redistributed or otherwise used for commercial purposes. Albuterol (By breathing)   Albuterol (al-BUE-ter-ol)  Treats or prevents bronchospasm. Brand Name(s): AccuNeb, ProAir HFA, ProAir RespiClick, Proventil HFA, Ventolin HFA   There may be other brand names for this medicine. When This Medicine Should Not Be Used: This medicine is not right for everyone. Do not use it if you had an allergic reaction to albuterol or milk proteins. How to Use This Medicine:   Aerosol, Powder Under Pressure, Suspension, Solution, Powder  · Your doctor will tell you how much medicine to use. Do not use more than directed. Ask your doctor or pharmacist if you have questions about how to use your inhaler, nebulizer, or medicine. · Solution:   ¨ You will use this medicine with an inhaler device called a nebulizer.  The nebulizer turns the medicine into a fine mist that you breathe in through your mouth and to your lungs. Your caregiver will show you how to use your nebulizer. ¨ Store unopened vials of this medicine at room temperature, away from heat and direct light. Do not freeze. An open vial of medicine must be used right away. · Aerosol inhaler:   ¨ Shake the inhaler well just before each use. Avoid spraying this medicine into your eyes. ¨ Test spray in the air before using for the first time or if the inhaler has not been used for a while. ¨ If you are supposed to use more than one puff, wait 1 to 2 minutes before inhaling the second puff. Repeat these steps for the next puff, starting with shaking the inhaler. ¨ Clean the inhaler mouthpiece at least once a week with warm running water for 30 seconds. Let it air dry completely. ¨ Store the canister at room temperature, away from heat and direct light. Do not freeze. Do not keep this medicine inside a car where it could be exposed to extreme heat or cold. Do not poke holes in the canister or throw it into a fire, even if the canister is empty. · ProAir® Respiclick® inhaler:   ¨ Make sure the cap is closed. Do not open the cap unless you are going to use the medicine. ¨ Hold the inhaler upright. Open the cap fully until you hear a click. Your inhaler is now ready to use. ¨ Close the cap firmly over the mouthpiece after each use. ¨ Keep the inhaler clean and dry at all times. Do not wash or put any part of the inhaler in water. ¨ To clean the mouthpiece, wipe it gently with a dry cloth or tissue. ¨ Keep the medicine in the foil pouch until you are ready to use it. Store at room temperature, away from heat and direct light. Do not freeze. · Read and follow the patient instructions that come with this medicine. Talk to your doctor or pharmacist if you have any questions. · Missed dose: Take a dose as soon as you remember.  If it is almost time for your next dose, wait until then and take a regular dose. Do not take extra medicine to make up for a missed dose. Drugs and Foods to Avoid:   Ask your doctor or pharmacist before using any other medicine, including over-the-counter medicines, vitamins, and herbal products. · Some foods and medicines can affect how albuterol works. Tell your doctor if you are using any of the following:  ¨ Digoxin  ¨ Beta-blocker medicine  ¨ Diuretic (water pill)  ¨ Medicine for depression or an MAO inhibitor within the past 2 weeks  Warnings While Using This Medicine:   · Tell your doctor if you are pregnant or breastfeeding, or if you have kidney disease, diabetes, heart disease, heart rhythm problems, high blood pressure, overactive thyroid, or a history of seizures. · This medicine may cause the following problems:   ¨ Paradoxical bronchospasm (increased trouble breathing right after use)  ¨ Low potassium levels in the blood  · If you use a corticosteroid medicine to control your asthma, keep using it as instructed by your doctor. · Call your doctor if your symptoms do not improve or if they get worse. · If any of your asthma medicines do not seem to be working as well as usual, call your doctor right away. Do not change your doses or stop using your medicines without asking your doctor. · Your doctor will check your progress and the effects of this medicine at regular visits. Keep all appointments. · Keep all medicine out of the reach of children. Never share your medicine with anyone.   Possible Side Effects While Using This Medicine:   Call your doctor right away if you notice any of these side effects:  · Allergic reaction: Itching or hives, swelling in your face or hands, swelling or tingling in your mouth or throat, chest tightness, trouble breathing  · Dry mouth, increased thirst, muscle cramps, nausea, vomiting  · Fast, pounding, or uneven heartbeat, chest pain  · Lightheadedness, dizziness, or fainting  · Trouble breathing, increased wheezing, cough, chest tightness  If you notice these less serious side effects, talk with your doctor:   · Cough, sore throat, runny or stuffy nose  · Headache  · Tremors, nervousness  If you notice other side effects that you think are caused by this medicine, tell your doctor. Call your doctor for medical advice about side effects. You may report side effects to FDA at 5-673-SSI-5772  © 2017 [facility] Information is for End User's use only and may not be sold, redistributed or otherwise used for commercial purposes. The above information is an  only. It is not intended as medical advice for individual conditions or treatments. Talk to your doctor, nurse or pharmacist before following any medical regimen to see if it is safe and effective for you. DISCHARGE SUMMARY from Nurse    PATIENT INSTRUCTIONS:    After general anesthesia or intravenous sedation, for 24 hours or while taking prescription Narcotics:  · Limit your activities  · Do not drive and operate hazardous machinery  · Do not make important personal or business decisions  · Do  not drink alcoholic beverages  · If you have not urinated within 8 hours after discharge, please contact your surgeon on call. Report the following to your surgeon:  · Excessive pain, swelling, redness or odor of or around the surgical area  · Temperature over 100.5  · Nausea and vomiting lasting longer than 4 hours or if unable to take medications  · Any signs of decreased circulation or nerve impairment to extremity: change in color, persistent  numbness, tingling, coldness or increase pain  · Any questions    What to do at Home:  Recommended activity: Activity as tolerated, and ambulate with oxygen    If you experience any of the following symptoms shortness of breath, please follow up with [facility] pulmonology. *  Please give a list of your current medications to your Primary Care Provider.     *  Please update this list whenever your medications are discontinued, doses are      changed, or new medications (including over-the-counter products) are added. *  Please carry medication information at all times in case of emergency situations. These are general instructions for a healthy lifestyle:    No smoking/ No tobacco products/ Avoid exposure to second hand smoke  Surgeon General's Warning:  Quitting smoking now greatly reduces serious risk to your health. Obesity, smoking, and sedentary lifestyle greatly increases your risk for illness    A healthy diet, regular physical exercise & weight monitoring are important for maintaining a healthy lifestyle    You may be retaining fluid if you have a history of heart failure or if you experience any of the following symptoms:  Weight gain of 3 pounds or more overnight or 5 pounds in a week, increased swelling in our hands or feet or shortness of breath while lying flat in bed. Please call your doctor as soon as you notice any of these symptoms; do not wait until your next office visit. Recognize signs and symptoms of STROKE:    F-face looks uneven    A-arms unable to move or move unevenly    S-speech slurred or non-existent    T-time-call 911 as soon as signs and symptoms begin-DO NOT go       Back to bed or wait to see if you get better-TIME IS BRAIN. Warning Signs of HEART ATTACK     Call 911 if you have these symptoms:   Chest discomfort. Most heart attacks involve discomfort in the center of the chest that lasts more than a few minutes, or that goes away and comes back. It can feel like uncomfortable pressure, squeezing, fullness, or pain.  Discomfort in other areas of the upper body. Symptoms can include pain or discomfort in one or both arms, the back, neck, jaw, or stomach.  Shortness of breath with or without chest discomfort.  Other signs may include breaking out in a cold sweat, nausea, or lightheadedness.   Don't wait more than five minutes to call 911 - MINUTES MATTER! Fast action can save your life. Calling 911 is almost always the fastest way to get lifesaving treatment. Emergency Medical Services staff can begin treatment when they arrive -- up to an hour sooner than if someone gets to the hospital by car. The discharge information has been reviewed with the patient. The patient verbalized understanding. Discharge medications reviewed with the patient and appropriate educational materials and side effects teaching were provided.   ___________________________________________________________________________________________________________________________________

## 2019-06-08 NOTE — PROGRESS NOTES
Pt is discharged home today with family as planned. CM made referral to [facility] for new home O2 set up and a nebulizer. No other needs expressed or orders received at this time.   Care Management Interventions  PCP Verified by CM: Yes(Last seen May 2019)  Palliative Care Criteria Met (RRAT>21 & CHF Dx)?: No(RRAT 21 Dx ANGELA )  Transition of Care Consult (CM Consult): Discharge Planning  Discharge Durable Medical Equipment: No(none)  Physical Therapy Consult: No  Occupational Therapy Consult: No  Speech Therapy Consult: No  Current Support Network: Lives Alone  Confirm Follow Up Transport: Self  Plan discussed with Pt/Family/Caregiver: Yes  Freedom of Choice Offered: Yes  Discharge Location  Discharge Placement: Home

## 2019-06-08 NOTE — DISCHARGE SUMMARY
Hospitalist Discharge Summary     Patient ID:  Lisa Christianson  [id number]  70 y.o.  1950  Admit date: 6/6/2019  6:37 PM  Discharge date and time: 6/8/2019  Attending: Gabino Favre, MD  PCP:  Colonel Bishop MD  Treatment Team: Attending Provider: Gabino Favre, MD; Consulting Provider: Yoly Meyers MD; Consulting Provider: Mayela Clement MD; Utilization Review: Oc Harris RN; Care Manager: Kenzie Seay RN    Principal Diagnosis Chest pain   Hospital Problems as of 6/8/2019 Date Reviewed: 5/2/2019          Codes Class Noted - Resolved POA    * (Principal) Chest pain ICD-10-CM: R07.9  ICD-9-CM: 786.50  5/31/2019 - Present Unknown        Hypertensive urgency ICD-10-CM: I16.0  ICD-9-CM: 401.9  6/8/2019 - Present Yes        Chronic respiratory failure with hypoxia ([facility] ICD-10-CM: J96.11  ICD-9-CM: 518.83, 799.02  6/8/2019 - Present Unknown        CAD S/P percutaneous coronary angioplasty ICD-10-CM: I25.10, Z98.61  ICD-9-CM: 414.01, V45.82  5/31/2019 - Present Yes        Chronic anxiety ICD-10-CM: F41.9  ICD-9-CM: 300.00  4/27/2017 - Present Yes        Essential hypertension, benign ICD-10-CM: I10  ICD-9-CM: 401.1  10/10/2016 - Present Yes        COPD (chronic obstructive pulmonary disease) ([facility] (Chronic) ICD-10-CM: J44.9  ICD-9-CM: 723  8/20/2013 - Present Yes    Overview Signed 2/5/2014  9:08 AM by Toma Delgado NP     Spirometry 2/5/14 fvc 2.28L, 67%; fev1 0.90L, 35%; FEV1/FVC 40%; severe obstruction with low FVC                        HPI:  '70years old F with PMH of CAD, HTN, anxiety, COPD presented to the hospital complaining of chest pain for 1 day. Patient reported pain is located in the middle of the chest, sharp in nature, radiated to her neck, alleviated by nitro,  not having chest pain at the moment. Patient reported she was recently admitted to the hospital for chest pain few days ago. Patient had stent placement and was discharged home. At home patient had chest pain and returned to the ED on 06/02. Patient reported he was discharged from ED but at home was still having symptoms. Patient denies dysuria, cough, rash, sick contacts at home, N/V/D.   In the ED patient had temp 100.2. WBC 7.9. PCT <0.1. Troponin was elevated to 0.16. EKG TWI on lateral leads that resolved on repeated EKG. ED physician contacted Cardiology who recommended admission to hospitalist service, no acute intervention recommended.'      Hospital Course:  Please refer to the admission H&P for details of presentation. In summary, the patient is a 69 yo F who was admitted for hypertensive urgency, chest pain, and dyspnea. She had recent RCA stent. Troponins were noted to be trending down from previous admission. Her bp meds titrated with carvedilol being increased to 6.25 mg and Imdur 15 mg started. BP came under great control. Due to chest pain and shortness of breath, CT chest obtained and showed no PE, but advanced emphysema. She had ambulating oxygen qualifier and she needs oxygen as below results:   Oxygen Qualifier          Room air: SpO2 with O2 and liter flow   Resting SpO2  99%  na   Ambulating SpO2  82% (after 30 seconds of ambulating) 80% on 1L  83% on 2L  85% on 3L  88% on 4L  90% on 5L       Due to subjective shortness of breath at rest, recommend 1-2 LPM at rest and 4-5 LPM with activity. On day of discharge, she is deemed stable to return home with oxygen being started.     Significant Diagnostic Studies:       Labs: Results:       Chemistry Recent Labs     06/08/19  0444 06/07/19 0452 06/06/19 1857   GLU 85 96 85    136 137   K 3.3* 3.4* 4.1    105 106   CO2 23 22 25   BUN 12 11 12   CREA 0.62 0.62 0.63   CA 8.7 8.4 9.6   AGAP 7 9 6*   AP  --   --  69   TP  --   --  8.3*   ALB  --   --  3.5   GLOB  --   --  4.8*   AGRAT  --   --  0.7*      CBC w/Diff Recent Labs     06/07/19 0452 06/06/19  1857   WBC 8.2 7.9   RBC 2.96* 3.41*   HGB 8.6* 10.0* HCT 26.1* 30.6*    313   GRANS 80* 79*   LYMPH 10* 11*   EOS 1 2      Cardiac Enzymes No results for input(s): CPK, CKND1, JOCE in the last 72 hours. No lab exists for component: CKRMB, TROIP   Coagulation No results for input(s): PTP, INR, APTT in the last 72 hours. No lab exists for component: INREXT, INREXT    Lipid Panel Lab Results   Component Value Date/Time    Cholesterol, total 143 2018 10:13 AM    HDL Cholesterol 50 2018 10:13 AM    LDL, calculated 75 2018 10:13 AM    VLDL, calculated 18 2018 10:13 AM    Triglyceride 90 2018 10:13 AM    CHOL/HDL Ratio 6.0 2013 04:31 PM      BNP No results for input(s): BNPP in the last 72 hours. Liver Enzymes Recent Labs     19  1857   TP 8.3*   ALB 3.5   AP 69   SGOT 21      Thyroid Studies Lab Results   Component Value Date/Time    TSH 1.500 2018 10:13 AM          Imaging:    CT CHEST W CONT   Final Result   IMPRESSION:     1. No evidence for pulmonary embolism. 2. Trace dependent left pleural effusion which can suggest early or mild fluid   overload. 3. Advanced emphysema. XR CHEST PA LAT   Final Result   IMPRESSION: Chronic interstitial changes. No acute abnormality evident. Results for orders placed or performed during the hospital encounter of 19   2D ECHO LIMTED ADULT W OR WO CONTR    Narrative    [facility]  [address], [facility] [address] [facility]  [phone]    Transthoracic Echocardiogram  2D and Doppler    Patient: Wanda Browne  MR #: [id number]  : 1950  Age: 70 years  Gender: Female  Study date: 2019  Account #: [de-identified]  Height: 67 in  Weight: 127 lb  BSA: 1.67 mï¾²  Status:Routine  Location: 304  BP: 106/ 59    Allergies: PROMETHAZINE    Sonographer:  Mini Jay RD  Group:  [facility]  Referring Physician:  Roseanna Lamas NP  Reading Physician:  Daron Luna.  Alejandra Salazar MD [facility]    INDICATIONS: R/o Pericardial Effusion. PROCEDURE: This was a routine study. A transthoracic echocardiogram was  performed. The study included limited 2D imaging and limited spectral   Doppler. Image quality was adequate. LEFT VENTRICLE: Systolic function was normal. Ejection fraction was estimated  in the range of 55 % to 60 %. PERICARDIUM: There was no pericardial effusion. SUMMARY:    -  Left ventricle: Systolic function was normal. Ejection fraction was  estimated in the range of 55 % to 60 %. Prepared and signed by    Marlee Iqbal. Meghan Villalta MD [facility]  Signed 07-Jun-2019 12:28:15       Discharge Exam:  Visit Vitals  /52 (BP 1 Location: Left arm, BP Patient Position: Sitting)   Pulse 73   Temp 98.5 °F (36.9 °C)   Resp 16   Ht 5' 7\" (1.702 m)   Wt 59.4 kg (131 lb)   SpO2 95%   BMI 20.52 kg/m²     Patient Vitals for the past 24 hrs:   Temp Pulse Resp BP SpO2   06/08/19 0916 98.5 °F (36.9 °C) 73 16 107/52 95 %   06/08/19 0816  77  117/51    06/08/19 0714     96 %   06/08/19 0544 97.8 °F (36.6 °C) 75 18 92/44 97 %   06/08/19 0047 99.3 °F (37.4 °C) 74 18 104/49 94 %   06/07/19 2100 98.6 °F (37 °C) 75 18 105/51 96 %   06/07/19 2002     96 %   06/07/19 1721 99.6 °F (37.6 °C) 80 19 106/54 97 %   06/07/19 1607     94 %   06/07/19 1340 98.3 °F (36.8 °C) 82 18 97/48 97 %       General appearance: alert, cooperative, no distress, appears stated age  Lungs: clear to auscultation bilaterally, breath sounds slightly distant but no wheeze  Heart: regular rate and rhythm, S1, S2 normal, no murmur, click, rub or gallop  Abdomen: soft, non-tender. Bowel sounds normal. No masses,  no organomegaly  Extremities: no cyanosis or edema  Neurologic: Grossly normal    Disposition: home  Discharge Condition: stable  Patient Instructions:   Current Discharge Medication List      START taking these medications    Details   isosorbide mononitrate ER (IMDUR) 30 mg tablet Take 0.5 Tabs by mouth daily. Indications: prevention of anginal chest pain associated with coronary artery disease  Qty: 30 Tab, Refills: 0      albuterol (PROVENTIL VENTOLIN) 2.5 mg /3 mL (0.083 %) nebulizer solution 3 mL by Nebulization route every six (6) hours as needed for Shortness of Breath. Indications: chronic obstructive lung disease  Qty: 120 Each, Refills: 1         CONTINUE these medications which have CHANGED    Details   carvedilol (COREG) 6.25 mg tablet Take 1 Tab by mouth two (2) times daily (with meals). Indications: high blood pressure  Qty: 180 Tab, Refills: 0    Comments: **Patient requests 90 days supply**         CONTINUE these medications which have NOT CHANGED    Details   nitroglycerin (NITROSTAT) 0.4 mg SL tablet 1 Tab by SubLINGual route every five (5) minutes as needed for Chest Pain. Qty: 1 Bottle, Refills: 12    Associated Diagnoses: Coronary artery disease involving native coronary artery without angina pectoris      tiotropium (SPIRIVA WITH HANDIHALER) 18 mcg inhalation capsule Take 1 Cap by inhalation daily. Qty: 90 Cap, Refills: 3      lisinopril (PRINIVIL, ZESTRIL) 2.5 mg tablet Take 1 Tab by mouth daily. Qty: 90 Tab, Refills: 3    Associated Diagnoses: Coronary artery disease involving native coronary artery without angina pectoris      clopidogrel (PLAVIX) 75 mg tab Take 1 Tab by mouth daily. Qty: 90 Tab, Refills: 3    Associated Diagnoses: Coronary artery disease involving native coronary artery without angina pectoris      pantoprazole (PROTONIX) 40 mg tablet Take 1 Tab by mouth two (2) times a day. Qty: 180 Tab, Refills: 3      albuterol (PROVENTIL HFA, VENTOLIN HFA, PROAIR HFA) 90 mcg/actuation inhaler Take 1 Puff by inhalation every four (4) hours as needed for Wheezing. Qty: 3 Inhaler, Refills: 3      cetirizine (ZYRTEC) 10 mg tablet Take  by mouth daily as needed. guaiFENesin ER (MUCINEX) 600 mg ER tablet Take 600 mg by mouth two (2) times daily as needed.       aspirin delayed-release 81 mg tablet Take  by mouth daily. atorvastatin (LIPITOR) 80 mg tablet Take 1 Tab by mouth daily. Qty: 90 Tab, Refills: 3    Associated Diagnoses: Coronary artery disease involving native coronary artery without angina pectoris; Hyperlipidemia      budesonide-formoterol (SYMBICORT) 160-4.5 mcg/actuation HFAA Take 2 Puffs by inhalation two (2) times a day. multivitamin (ONE A DAY) tablet Take 1 Tab by mouth daily. STOP taking these medications       montelukast (SINGULAIR) 10 mg tablet Comments:   Reason for Stopping:               Activity: Activity as tolerated  Diet: Cardiac Diet    Discharge plan discussed with patient and her daughter, Amanda Garner, at bedside. Follow-up      Follow-up Appointments   Procedures    FOLLOW UP VISIT Appointment in: Other ([facility]) 1. As scheduled with [facility] 2. [facility] (Dr. Eduard Rodrigez or partner) in 3-4 weeks (patient seen by [facility] in past and needs to re-establish) 3. Dr. Grayson Garcia (PCP) within one week for. .. 1. As scheduled with [facility]  2. [facility] (Dr. Eduard Rodrigez or partner) in 3-4 weeks (patient seen by [facility] in past and needs to re-establish)  3. Dr. Grayson Garcia (PCP) within one week for hospital follow up     Standing Status:   Standing     Number of Occurrences:   1     Order Specific Question:   Appointment in     Answer: Other (Specify)     ·   Time spent to discharge patient 31 minutes  Signed:  Dominique Ann.  Mirta Florez MD  6/8/2019  12:27 PM

## 2019-06-08 NOTE — PROGRESS NOTES
Problem: Falls - Risk of  Goal: *Absence of Falls  Description  Document Callum Miles Fall Risk and appropriate interventions in the flowsheet.   Outcome: Progressing Towards Goal     Problem: Patient Education: Go to Patient Education Activity  Goal: Patient/Family Education  Outcome: Progressing Towards Goal

## 2019-06-08 NOTE — PROGRESS NOTES
[facility] CARDIOLOGY PROGRESS NOTE           6/8/2019 8:11 AM    Admit Date: 6/6/2019         Subjective: presented with CP in the setting of elevated BP, now controlled    ROS:  Cardiovascular:  As noted above    Objective:      Vitals:    06/08/19 0047 06/08/19 0416 06/08/19 0544 06/08/19 0714   BP: 104/49  92/44    Pulse: 74  75    Resp: 18  18    Temp: 99.3 °F (37.4 °C)  97.8 °F (36.6 °C)    SpO2: 94%  97% 96%   Weight:  59.4 kg (131 lb)     Height:           Physical Exam:  General: Well Developed, Well Nourished, No Acute Distress, Alert & Oriented x 3, Appropriate mood  Neck: supple, no JVD  Heart: S1S2 with RRR without murmurs or gallops  Lungs: Clear throughout auscultation bilaterally without adventitious sounds  Abd: soft, nontender, nondistended, with good bowel sounds  Ext: no edema bilaterally  Skin: warm and dry      Data Review:   Recent Labs     06/08/19  0444 06/07/19  0452 06/06/19  1857    136 137   K 3.3* 3.4* 4.1   BUN 12 11 12   CREA 0.62 0.62 0.63   GLU 85 96 85   WBC  --  8.2 7.9   HGB  --  8.6* 10.0*   HCT  --  26.1* 30.6*   PLT  --  273 313       Recent Labs     06/07/19  1552 06/07/19  1046 06/07/19  0452 06/06/19  2220 06/06/19  1902   TNIPOC  --   --   --  0.17* 0.16*   TROIQ 0.16* 0.21* 0.25*  --   --          Assessment/Plan:     Principal Problem:    Chest pain (5/31/2019)    Active Problems:    COPD (chronic obstructive pulmonary disease) (H[facility]) (8/20/2013)      Overview: Spirometry 2/5/14 fvc 2.28L, 67%; fev1 0.90L, 35%; FEV1/FVC 40%; severe       obstruction with low FVC            Essential hypertension, benign (10/10/2016)      Chronic anxiety (4/27/2017)      CAD S/P percutaneous coronary angioplasty (5/31/2019)    A/P  1) BP better control now continue BB/ACE  2) CAD - DAPT, statin  3) Anxiety - per primary team      Anjelica Lopes MD  6/8/2019 8:11 AM

## 2019-06-08 NOTE — PROGRESS NOTES
Referral to [facility] for new home O2 set up and a nebulizer for home use in anticipation of pt discharge. Portable tank provided for pt transport home.

## 2019-06-08 NOTE — PROGRESS NOTES
Oxygen Qualifier       Room air: SpO2 with O2 and liter flow   Resting SpO2  99%  na   Ambulating SpO2  82% (after 30 seconds of ambulating) 80% on 1L  83% on 2L  85% on 3L  88% on 4L  90% on 5L       Completed by:    Lela Stnaley, RT

## 2019-06-08 NOTE — CONSULTS
CONSULT NOTE    Jeana Madrid    6/8/2019    Date of Admission:  6/6/2019    The patient's chart is reviewed and the patient is discussed with the staff. Subjective:     Patient is a 70 y.o.  female seen and evaluated at the request of Dr. Az Chatterjee. Patient has a history of anxiety, tobacco abuse, COPD, HTN, HL, and CAD (previous stents). She is followed by [facility] as an outpatient and was admitted 5/31/19 / 6/1/19 for [facility] with findings of 99% stenosis of the mRCA which was stented with 0% residual. She returned to the ER the following day with worsening chest pain and sob. Her case was discussed with Cardiology, troponin was trended, given neb tx. She improved, troponin trended downward and she was discharged home. She returned to the ER on 6/6 with c/o ongoing chest pain improved with NTG, sob, and BLE swelling. She was admitted by the Hospitalist, Cardiology was consulted with no further plans for intervention as pain not consistent with angina. BP meds were adjusted. Patient has been seen by [facility] in the past but not since 2014. She is currently maintained on Albuterol / symbicort / spiriva. Ambulating RA sat was monitored today and patient required O2 at 5 lpm to maintain sat in the 90s (did not previously require). We are consulted to see patient for hypoxia and to establish care for outpatient follow up. Review of Systems  A comprehensive review of systems was negative except for that written in the HPI.     Patient Active Problem List   Diagnosis Code    CAD (coronary artery disease) I25.10    Tobacco abuse Z72.0    History of MI (myocardial infarction) I25.2    Pulmonary hemorrhage R04.89    COPD (chronic obstructive pulmonary disease) ([address] J44.9    Hypoxemia R09.02    Hyperlipidemia E78.5    Essential hypertension, benign I10    Coronary artery disease involving native coronary artery of native heart without angina pectoris I25.10    Chronic anxiety F41.9    Ventricular ectopy I49.3    Chest pain R07.9    CAD S/P percutaneous coronary angioplasty I25.10, Z98.61    Hypertensive urgency I16.0    Chronic respiratory failure with hypoxia (HCC) J96.11           Prior to Admission Medications   Prescriptions Last Dose Informant Patient Reported? Taking? albuterol (PROVENTIL HFA, VENTOLIN HFA, PROAIR HFA) 90 mcg/actuation inhaler 2019 at Unknown time  No Yes   Sig: Take 1 Puff by inhalation every four (4) hours as needed for Wheezing. aspirin delayed-release 81 mg tablet 2019 at Unknown time  Yes Yes   Sig: Take  by mouth daily. atorvastatin (LIPITOR) 80 mg tablet 2019  No No   Sig: Take 1 Tab by mouth daily. Patient taking differently: Take 80 mg by mouth nightly. budesonide-formoterol (SYMBICORT) 160-4.5 mcg/actuation HFAA 2019  Yes No   Sig: Take 2 Puffs by inhalation two (2) times a day. carvedilol (COREG) 3.125 mg tablet 2019 at Unknown time  No No   Sig: Take 1 Tab by mouth two (2) times daily (with meals). cetirizine (ZYRTEC) 10 mg tablet 2019 at Unknown time  Yes Yes   Sig: Take  by mouth daily as needed. clopidogrel (PLAVIX) 75 mg tab 2019 at Unknown time  No Yes   Sig: Take 1 Tab by mouth daily. guaiFENesin ER (MUCINEX) 600 mg ER tablet 2019 at Unknown time  Yes Yes   Sig: Take 600 mg by mouth two (2) times daily as needed. lisinopril (PRINIVIL, ZESTRIL) 2.5 mg tablet 2019 at Unknown time  No Yes   Sig: Take 1 Tab by mouth daily. multivitamin (ONE A DAY) tablet   Yes No   Sig: Take 1 Tab by mouth daily. nitroglycerin (NITROSTAT) 0.4 mg SL tablet 2019 at Unknown time  No Yes   Si Tab by SubLINGual route every five (5) minutes as needed for Chest Pain.   pantoprazole (PROTONIX) 40 mg tablet 2019 at morning  No Yes   Sig: Take 1 Tab by mouth two (2) times a day.    tiotropium (SPIRIVA WITH HANDIHALER) 18 mcg inhalation capsule 2019 at Unknown time No Yes   Sig: Take 1 Cap by inhalation daily. Facility-Administered Medications: None       Past Medical History:   Diagnosis Date    CAD (coronary artery disease) , 2013    PCI,  Papi Abide    Chronic anxiety 2017    COPD     Dr. Roselyn Wolff GERD (gastroesophageal reflux disease)     Hypertension     Nausea & vomiting     Thyroid disease      Past Surgical History:   Procedure Laterality Date    CARDIAC SURG PROCEDURE UNLIST  , 13    LAD    HX GYN      rebuilt cervix    HX TONSIL AND ADENOIDECTOMY       Social History     Socioeconomic History    Marital status:      Spouse name: Not on file    Number of children: Not on file    Years of education: Not on file    Highest education level: Not on file   Occupational History    Occupation: [employer]     Employer: SELF EMPLOYED     Comment: home   Social Needs    Financial resource strain: Not on file    Food insecurity:     Worry: Not on file     Inability: Not on file    Transportation needs:     Medical: Not on file     Non-medical: Not on file   Tobacco Use    Smoking status: Former Smoker     Years: 45.00     Types: Cigarettes     Last attempt to quit: 2013     Years since quittin.8    Smokeless tobacco: Never Used   Substance and Sexual Activity    Alcohol use:  Yes     Alcohol/week: 1.8 oz     Types: 1 Glasses of wine, 1 Cans of beer, 1 Shots of liquor per week     Comment: rare    Drug use: No    Sexual activity: Not on file   Lifestyle    Physical activity:     Days per week: Not on file     Minutes per session: Not on file    Stress: Not on file   Relationships    Social connections:     Talks on phone: Not on file     Gets together: Not on file     Attends Holiness service: Not on file     Active member of club or organization: Not on file     Attends meetings of clubs or organizations: Not on file     Relationship status: Not on file    Intimate partner violence: Fear of current or ex partner: Not on file     Emotionally abused: Not on file     Physically abused: Not on file     Forced sexual activity: Not on file   Other Topics Concern    Not on file   Social History Narrative    , has 2 children, 1 step child. Lives alone.   Works as an analyst.      Family History   Problem Relation Age of Onset    Heart Attack Father         Adoptive father    No Known Problems Mother         adopted     Allergies   Allergen Reactions    Phenergan [Promethazine] Nausea and Vomiting     Severe vomiting        Current Facility-Administered Medications   Medication Dose Route Frequency    sodium chloride (NS) flush 5-40 mL  5-40 mL IntraVENous Q8H    sodium chloride (NS) flush 5-40 mL  5-40 mL IntraVENous PRN    nitroglycerin (NITROSTAT) tablet 0.4 mg  0.4 mg SubLINGual Q5MIN PRN    morphine injection 2 mg  2 mg IntraVENous Q4H PRN    acetaminophen (TYLENOL) tablet 650 mg  650 mg Oral Q4H PRN    HYDROcodone-acetaminophen (NORCO) 5-325 mg per tablet 1 Tab  1 Tab Oral Q6H PRN    heparin (porcine) injection 5,000 Units  5,000 Units SubCUTAneous Q8H    albuterol (PROVENTIL VENTOLIN) nebulizer solution 2.5 mg  2.5 mg Nebulization Q6H PRN    aspirin delayed-release tablet 81 mg  81 mg Oral DAILY    clopidogrel (PLAVIX) tablet 75 mg  75 mg Oral DAILY    montelukast (SINGULAIR) tablet 10 mg  10 mg Oral DAILY    pantoprazole (PROTONIX) tablet 40 mg  40 mg Oral BID    tiotropium (SPIRIVA) inhalation capsule 18 mcg  1 Cap Inhalation DAILY    budesonide (PULMICORT) 500 mcg/2 ml nebulizer suspension  500 mcg Nebulization BID RT    And    albuterol (PROVENTIL VENTOLIN) nebulizer solution 2.5 mg  2.5 mg Nebulization Q6HWA RT    hydrALAZINE (APRESOLINE) 20 mg/mL injection 10 mg  10 mg IntraVENous Q6H PRN    atorvastatin (LIPITOR) tablet 80 mg  80 mg Oral QHS    isosorbide mononitrate ER (IMDUR) tablet 15 mg  15 mg Oral DAILY    carvedilol (COREG) tablet 6.25 mg  6.25 mg Oral BID WITH MEALS    lisinopril (PRINIVIL, ZESTRIL) tablet 5 mg  5 mg Oral DAILY         Objective:     Vitals:    06/08/19 0714 06/08/19 0816 06/08/19 0916 06/08/19 1305   BP:  117/51 107/52 118/69   Pulse:  77 73 75   Resp:   16 17   Temp:   98.5 °F (36.9 °C) 97.5 °F (36.4 °C)   SpO2: 96%  95% 98%   Weight:       Height:           PHYSICAL EXAM     Constitutional:  the patient is well developed and in no acute distress  EENMT:  Sclera clear, pupils equal, oral mucosa moist  Respiratory: clear with decreased breath sounds  Cardiovascular:  RRR without M,G,R  Gastrointestinal: soft and non-tender; with positive bowel sounds. Musculoskeletal: warm without cyanosis. There is no lower extremity edema. Skin:  no jaundice or rashes, no wounds   Neurologic: no gross neuro deficits     Psychiatric:  alert and oriented x 3    CXR:    6/6/19            6/7      2/5/14        Recent Labs     06/07/19  0452 06/06/19  1857   WBC 8.2 7.9   HGB 8.6* 10.0*   HCT 26.1* 30.6*    313     Recent Labs     06/08/19  0444 06/07/19  1552 06/07/19  1046 06/07/19  0452 06/06/19  1857     --   --  136 137   K 3.3*  --   --  3.4* 4.1     --   --  105 106   GLU 85  --   --  96 85   CO2 23  --   --  22 25   BUN 12  --   --  11 12   CREA 0.62  --   --  0.62 0.63   CA 8.7  --   --  8.4 9.6   TROIQ  --  0.16* 0.21* 0.25*  --    ALB  --   --   --   --  3.5   TBILI  --   --   --   --  0.7   ALT  --   --   --   --  22   SGOT  --   --   --   --  21     No results for input(s): PH, PCO2, PO2, HCO3 in the last 72 hours. No results for input(s): LCAD, LAC in the last 72 hours.     Assessment:  (Medical Decision Making)     Hospital Problems  Date Reviewed: 6/8/2019          Codes Class Noted POA    * (Principal) Chest pain ICD-10-CM: R07.9  ICD-9-CM: 786.50  5/31/2019 Unknown    S/P mRCA stent placement 5/31/19  No plans for further intervention per Cardiology  BP control      Hypertensive urgency ICD-10-CM: I16.0  ICD-9-CM: 401.9 6/8/2019 Yes    meds adjusted      Chronic respiratory failure with hypoxia [facility]) ICD-10-CM: J96.11  ICD-9-CM: 518.83, 799.02  6/8/2019 Unknown    Ambulatory sat requiring O2 at 5 lpm      CAD S/P percutaneous coronary angioplasty ICD-10-CM: I25.10, Z98.61  ICD-9-CM: 414.01, V45.82  5/31/2019 Yes        Chronic anxiety ICD-10-CM: F41.9  ICD-9-CM: 300.00  4/27/2017 Yes        Essential hypertension, benign ICD-10-CM: I10  ICD-9-CM: 401.1  10/10/2016 Yes        COPD (chronic obstructive pulmonary disease) (HCC) (Chronic) ICD-10-CM: J44.9  ICD-9-CM: 496  8/20/2013 Yes     Spirometry 2/5/14 fvc 2.28L, 67%; fev1 0.90L, 35%; FEV1/FVC 40%; severe obstruction with low FVC                Plan:  (Medical Decision Making)     --Albuterol / Nga Borders / Seleta Lint / Deisy Cocks  --discharge home with o2 - required 5 lpm to maintain sats in the 90s with ambulation  --Follow up with [facility] in 2 weeks with PFTs and ambulating sat    More than 50% of the time documented was spent in face-to-face contact with the patient and in the care of the patient on the floor/unit where the patient is located. Thank you very much for this referral.  We appreciate the opportunity to participate in this patient's care. Will follow along with above stated plan. Dmitri Arana NP  Lungs:  Clear with decreased breath sounds  Heart:  RRR with no Murmur/Rubs/Gallops    Additional Comments: Will follow up in 2 weeks and reassess O2 needs,  Check pfts, and consider trial of trelegy    I have spoken with and examined the patient. I agree with the above assessment and plan as documented.     Lucina De La Torre MD

## 2019-06-11 LAB
BACTERIA SPEC CULT: NORMAL
BACTERIA SPEC CULT: NORMAL
SERVICE CMNT-IMP: NORMAL
SERVICE CMNT-IMP: NORMAL

## 2019-06-20 ENCOUNTER — HOSPITAL ENCOUNTER (OUTPATIENT)
Dept: LAB | Age: 69
Discharge: HOME | End: 2019-06-20
Payer: MEDICARE

## 2019-06-20 DIAGNOSIS — I25.10 CORONARY ARTERY DISEASE INVOLVING NATIVE CORONARY ARTERY OF NATIVE HEART WITHOUT ANGINA PECTORIS: Chronic | ICD-10-CM

## 2019-06-20 LAB
BASOPHILS # BLD: 0 K/UL (ref 0–0.2)
BASOPHILS NFR BLD: 1 % (ref 0–2)
DIFFERENTIAL METHOD BLD: ABNORMAL
EOSINOPHIL # BLD: 0.2 K/UL (ref 0–0.8)
EOSINOPHIL NFR BLD: 3 % (ref 0.5–7.8)
ERYTHROCYTE [DISTWIDTH] IN BLOOD BY AUTOMATED COUNT: 16.6 % (ref 11.9–14.6)
HCT VFR BLD AUTO: 32.3 % (ref 35.8–46.3)
HGB BLD-MCNC: 10 G/DL (ref 11.7–15.4)
IMM GRANULOCYTES # BLD AUTO: 0 K/UL (ref 0–0.5)
IMM GRANULOCYTES NFR BLD AUTO: 0 % (ref 0–5)
LYMPHOCYTES # BLD: 1 K/UL (ref 0.5–4.6)
LYMPHOCYTES NFR BLD: 15 % (ref 13–44)
MCH RBC QN AUTO: 28.7 PG (ref 26.1–32.9)
MCHC RBC AUTO-ENTMCNC: 31 G/DL (ref 31.4–35)
MCV RBC AUTO: 92.6 FL (ref 79.6–97.8)
MONOCYTES # BLD: 0.7 K/UL (ref 0.1–1.3)
MONOCYTES NFR BLD: 11 % (ref 4–12)
NEUTS SEG # BLD: 4.4 K/UL (ref 1.7–8.2)
NEUTS SEG NFR BLD: 71 % (ref 43–78)
NRBC # BLD: 0 K/UL (ref 0–0.2)
PLATELET # BLD AUTO: 374 K/UL (ref 150–450)
PMV BLD AUTO: 9.9 FL (ref 9.4–12.3)
RBC # BLD AUTO: 3.49 M/UL (ref 4.05–5.2)
WBC # BLD AUTO: 6.2 K/UL (ref 4.3–11.1)

## 2019-06-20 PROCEDURE — 36415 COLL VENOUS BLD VENIPUNCTURE: CPT

## 2019-06-20 PROCEDURE — 85025 COMPLETE CBC W/AUTO DIFF WBC: CPT

## 2019-06-26 ENCOUNTER — HOSPITAL ENCOUNTER (OUTPATIENT)
Dept: CARDIAC REHAB | Age: 69
Discharge: HOME | End: 2019-06-26

## 2019-06-26 RX ORDER — ATORVASTATIN CALCIUM 80 MG/1
80 TABLET, FILM COATED ORAL DAILY
COMMUNITY
End: 2019-06-27

## 2019-06-26 NOTE — PROGRESS NOTES
Dear Dr. Gwyn Hinojosa you for referring your patient, Jayne Lane ( 1950), to the Cardiopulmonary Rehabilitation Program at [facility]. She is a good candidate for the Cardiac Rehab Program and should see improvements with regular participation. We will be addressing appropriate interventions for modifiable risk factors with your patient during the next 12 weeks. We will contact you with any issues or concerns that may arise, or you can follow your patient's progress through [address] [place] [address] at any time. A final summary will be sent to you when the program is completed. Again, thank you for the referral. If we can be of further assistance, please feel free to contact the Cardiopulmonary Rehab staff at [phone].     Sincerely,    APRYL ZengN, RN  Cardiopulmonary Rehabilitation Nurse Liaison  [facility]

## 2019-07-01 ENCOUNTER — HOSPITAL ENCOUNTER (OUTPATIENT)
Dept: LAB | Age: 69
Discharge: HOME | End: 2019-07-01
Payer: MEDICARE

## 2019-07-01 DIAGNOSIS — D50.9 IRON DEFICIENCY ANEMIA, UNSPECIFIED IRON DEFICIENCY ANEMIA TYPE: ICD-10-CM

## 2019-07-01 DIAGNOSIS — I25.10 CORONARY ARTERY DISEASE INVOLVING NATIVE CORONARY ARTERY OF NATIVE HEART WITHOUT ANGINA PECTORIS: ICD-10-CM

## 2019-07-01 LAB
CHOLEST SERPL-MCNC: 151 MG/DL
FERRITIN SERPL-MCNC: 67 NG/ML (ref 8–388)
HDLC SERPL-MCNC: 65 MG/DL (ref 40–60)
HDLC SERPL: 2.3 {RATIO}
IRON SATN MFR SERPL: 19 %
IRON SERPL-MCNC: 55 UG/DL (ref 35–150)
LDLC SERPL CALC-MCNC: 72.8 MG/DL
LIPID PROFILE,FLP: ABNORMAL
TIBC SERPL-MCNC: 293 UG/DL (ref 250–450)
TRIGL SERPL-MCNC: 66 MG/DL (ref 35–150)
VLDLC SERPL CALC-MCNC: 13.2 MG/DL (ref 6–23)

## 2019-07-01 PROCEDURE — 80061 LIPID PANEL: CPT

## 2019-07-01 PROCEDURE — 36415 COLL VENOUS BLD VENIPUNCTURE: CPT

## 2019-07-01 PROCEDURE — 82728 ASSAY OF FERRITIN: CPT

## 2019-07-01 PROCEDURE — 83540 ASSAY OF IRON: CPT

## 2019-07-10 ENCOUNTER — HOSPITAL ENCOUNTER (OUTPATIENT)
Dept: CARDIAC REHAB | Age: 69
Discharge: HOME | End: 2019-07-10
Payer: MEDICARE

## 2019-07-10 VITALS — BODY MASS INDEX: 19.64 KG/M2 | HEIGHT: 68 IN | WEIGHT: 129.6 LBS

## 2019-07-10 PROCEDURE — 93798 PHYS/QHP OP CAR RHAB W/ECG: CPT

## 2019-07-12 ENCOUNTER — APPOINTMENT (OUTPATIENT)
Dept: CARDIAC REHAB | Age: 69
End: 2019-07-12
Payer: MEDICARE

## 2019-07-17 ENCOUNTER — HOSPITAL ENCOUNTER (OUTPATIENT)
Dept: CARDIAC REHAB | Age: 69
Discharge: HOME | End: 2019-07-17
Payer: MEDICARE

## 2019-07-17 VITALS — WEIGHT: 130 LBS | BODY MASS INDEX: 19.77 KG/M2

## 2019-07-17 PROCEDURE — 93798 PHYS/QHP OP CAR RHAB W/ECG: CPT

## 2019-07-19 ENCOUNTER — HOSPITAL ENCOUNTER (OUTPATIENT)
Dept: CARDIAC REHAB | Age: 69
Discharge: HOME | End: 2019-07-19
Payer: MEDICARE

## 2019-07-19 PROCEDURE — 93798 PHYS/QHP OP CAR RHAB W/ECG: CPT

## 2019-07-24 ENCOUNTER — HOSPITAL ENCOUNTER (OUTPATIENT)
Dept: CARDIAC REHAB | Age: 69
Discharge: HOME | End: 2019-07-24
Payer: MEDICARE

## 2019-07-24 VITALS — BODY MASS INDEX: 20.07 KG/M2 | WEIGHT: 132 LBS

## 2019-07-24 PROCEDURE — 93798 PHYS/QHP OP CAR RHAB W/ECG: CPT

## 2019-07-26 ENCOUNTER — HOSPITAL ENCOUNTER (OUTPATIENT)
Dept: CARDIAC REHAB | Age: 69
Discharge: HOME | End: 2019-07-26
Payer: MEDICARE

## 2019-07-26 PROCEDURE — 93798 PHYS/QHP OP CAR RHAB W/ECG: CPT

## 2019-07-31 ENCOUNTER — APPOINTMENT (OUTPATIENT)
Dept: CARDIAC REHAB | Age: 69
End: 2019-07-31
Payer: MEDICARE

## 2019-08-02 ENCOUNTER — HOSPITAL ENCOUNTER (OUTPATIENT)
Dept: CARDIAC REHAB | Age: 69
Discharge: HOME | End: 2019-08-02
Payer: MEDICARE

## 2019-08-02 PROCEDURE — 93798 PHYS/QHP OP CAR RHAB W/ECG: CPT

## 2019-08-07 ENCOUNTER — HOSPITAL ENCOUNTER (OUTPATIENT)
Dept: CARDIAC REHAB | Age: 69
Discharge: HOME | End: 2019-08-07
Payer: MEDICARE

## 2019-08-07 VITALS — BODY MASS INDEX: 20.31 KG/M2 | WEIGHT: 133.6 LBS

## 2019-08-07 PROCEDURE — 93798 PHYS/QHP OP CAR RHAB W/ECG: CPT

## 2019-08-09 ENCOUNTER — HOSPITAL ENCOUNTER (OUTPATIENT)
Dept: CARDIAC REHAB | Age: 69
Discharge: HOME | End: 2019-08-09
Payer: MEDICARE

## 2019-08-09 PROCEDURE — 93798 PHYS/QHP OP CAR RHAB W/ECG: CPT

## 2019-08-14 ENCOUNTER — HOSPITAL ENCOUNTER (OUTPATIENT)
Dept: CARDIAC REHAB | Age: 69
Discharge: HOME | End: 2019-08-14
Payer: MEDICARE

## 2019-08-14 VITALS — WEIGHT: 131.4 LBS | BODY MASS INDEX: 19.98 KG/M2

## 2019-08-14 PROCEDURE — 93798 PHYS/QHP OP CAR RHAB W/ECG: CPT

## 2019-08-16 ENCOUNTER — HOSPITAL ENCOUNTER (OUTPATIENT)
Dept: CARDIAC REHAB | Age: 69
Discharge: HOME | End: 2019-08-16
Payer: MEDICARE

## 2019-08-16 PROCEDURE — 93798 PHYS/QHP OP CAR RHAB W/ECG: CPT

## 2019-08-16 NOTE — PROGRESS NOTES
70year old female s/p PCI  enrolled in cardiac rehabilitation, seen today for initial nutrition counseling. States she fatigues easily, today exercise was difficult. No concerns with appetite, but has a difficult time eating the volume of food she needs to. Stated Nutrition Goals: To learn to cook healthy for one. Medical History: COPD, GERD, HTN, Anxiety, Anemia  Nutrition related medications/supplements: MVT, statin, betablocker, plavix, spiriva. Nutrition Related Labs: reviewed. BS/lipid WNL. Social History: Employed, works from home, she is  and lives alone. [facility] Physical Activity: Rehabilitation 2x per week, off days pt is not engaging in formal physical activity. Food and Nutrition Intake History:   Has received cardiac diet instructions in the past and has not made any significant changes. Presently consumes 0-1 cups of f/v per day, rarely consider sodium in her diet, and eats out 3x per week. Snacks include potatoe chips and cheese/crackers. She prefers fresh fruits and minimally processed meats and snack. States she had dental issues and avoids nuts and seeds. Stated 1 day diet recall includes   Breakfast: 1 egg, toast with butter \"of course\", + coffee  Lunch: Frozen meatloaf with mashed potatoes, completed 100% + coffee  Dinner: 1/2 tuna fish sandwich, with dill pickles. Beverages: milk, coffee, water  Alcohol use: daily 1 glass/drink. GI Hx: /Digestive Issues:     Anthropometric Data:  BMI:  19.98 kg/m² (boarderline underweight)  Height: 5' 8\" (1.727 m). Weight: 59.6 kg (131 lb 6.4 oz).; IBW = 140 (-10%)=126 pounds. Estimated Nutritional Needs:  Calories: MSJ x 1.2 (sedentary)  for weight maintenance: 1400kcal/day   Protein: 70-75 grams (20-25% of estimated energy low end needs)-  Stage of Behavior Change: preparation     Nutrition Diagnosis:    Inadequate intake of nutrients related to food choices/ food knowledge evidenced by recall.      Nutrition Intervention:  1. Nutrition Education: Educated patient and spouse on cardioprotective meal pattern/Mediterranean meal pattern including   Guidelines for saturated fat (<7% total calories), sodium ( < 2000mg/day or follow MD recommendations), and added sugars ( < 25 grams for women/<35 grams for men) and high sources of each reviewed   Meal planning- supported barriers to preparing meals at home. Reviewed tips to reduce convenience eating.  Reviewed heart healthy protein sources, sampled hemp seeds today. 2. Goals Setting: Set specific, proximal goals in collaboration with patient, including personalized plan that patient can achieve his/her goals during cardiac rehabilitation. 3  Optimize nutrition to maintain current body weight: Total Energy needs, and protein needs provided, protein food sources and adequacy reviewed to improve stamina and support rehab goals. Handouts provided for home use:    Heart healthy eating pattern with 5 day sample menu.  Heart healthy recipes, recipe modification tips.  Meal planning guide- low sodium ready to eat list of foods. Nutrition Goals:    Improve exercise tolerance by meeting nutritional needs for protein and calories x1 month. Monitoring/Evaluation: RD to follow up with participant during rehab sessions for questions and assessment of progression toward goals. Compliance: Pt agreeable to increasing protein and f/v intake.    Barriers: None identified at this time     Cris Riley, MS, RD, LD  Cardiac/Pulmonary Rehab Dietitian

## 2019-08-21 ENCOUNTER — HOSPITAL ENCOUNTER (OUTPATIENT)
Dept: CARDIAC REHAB | Age: 69
Discharge: HOME | End: 2019-08-21
Payer: MEDICARE

## 2019-08-21 VITALS — WEIGHT: 134 LBS | BODY MASS INDEX: 20.37 KG/M2

## 2019-08-21 PROCEDURE — 93798 PHYS/QHP OP CAR RHAB W/ECG: CPT

## 2019-08-23 ENCOUNTER — HOSPITAL ENCOUNTER (OUTPATIENT)
Dept: CARDIAC REHAB | Age: 69
Discharge: HOME | End: 2019-08-23
Payer: MEDICARE

## 2019-08-23 PROCEDURE — 93798 PHYS/QHP OP CAR RHAB W/ECG: CPT

## 2019-08-28 ENCOUNTER — HOSPITAL ENCOUNTER (OUTPATIENT)
Dept: CARDIAC REHAB | Age: 69
Discharge: HOME | End: 2019-08-28
Payer: MEDICARE

## 2019-08-28 VITALS — WEIGHT: 134.8 LBS | BODY MASS INDEX: 20.5 KG/M2

## 2019-08-28 PROCEDURE — 93798 PHYS/QHP OP CAR RHAB W/ECG: CPT

## 2019-08-30 ENCOUNTER — HOSPITAL ENCOUNTER (OUTPATIENT)
Dept: CARDIAC REHAB | Age: 69
Discharge: HOME | End: 2019-08-30
Payer: MEDICARE

## 2019-08-30 PROCEDURE — 93798 PHYS/QHP OP CAR RHAB W/ECG: CPT

## 2019-09-04 ENCOUNTER — HOSPITAL ENCOUNTER (OUTPATIENT)
Dept: CARDIAC REHAB | Age: 69
Discharge: HOME | End: 2019-09-04
Payer: MEDICARE

## 2019-09-04 VITALS — BODY MASS INDEX: 20.53 KG/M2 | WEIGHT: 135 LBS

## 2019-09-04 PROCEDURE — 93798 PHYS/QHP OP CAR RHAB W/ECG: CPT

## 2019-09-06 ENCOUNTER — HOSPITAL ENCOUNTER (OUTPATIENT)
Dept: CARDIAC REHAB | Age: 69
Discharge: HOME | End: 2019-09-06
Payer: MEDICARE

## 2019-09-06 PROCEDURE — 93798 PHYS/QHP OP CAR RHAB W/ECG: CPT

## 2019-09-11 ENCOUNTER — HOSPITAL ENCOUNTER (OUTPATIENT)
Dept: CARDIAC REHAB | Age: 69
Discharge: HOME | End: 2019-09-11
Payer: MEDICARE

## 2019-09-11 VITALS — WEIGHT: 136.6 LBS | BODY MASS INDEX: 20.77 KG/M2

## 2019-09-11 PROCEDURE — 93798 PHYS/QHP OP CAR RHAB W/ECG: CPT

## 2019-09-13 ENCOUNTER — HOSPITAL ENCOUNTER (OUTPATIENT)
Dept: CARDIAC REHAB | Age: 69
Discharge: HOME | End: 2019-09-13
Payer: MEDICARE

## 2019-09-13 PROCEDURE — 93798 PHYS/QHP OP CAR RHAB W/ECG: CPT

## 2019-09-18 ENCOUNTER — HOSPITAL ENCOUNTER (OUTPATIENT)
Dept: CARDIAC REHAB | Age: 69
Discharge: HOME | End: 2019-09-18
Payer: MEDICARE

## 2019-09-18 VITALS — WEIGHT: 135.4 LBS | BODY MASS INDEX: 20.59 KG/M2

## 2019-09-18 PROCEDURE — 93798 PHYS/QHP OP CAR RHAB W/ECG: CPT

## 2019-09-20 ENCOUNTER — APPOINTMENT (OUTPATIENT)
Dept: CARDIAC REHAB | Age: 69
End: 2019-09-20
Payer: MEDICARE

## 2019-09-25 ENCOUNTER — HOSPITAL ENCOUNTER (OUTPATIENT)
Dept: CARDIAC REHAB | Age: 69
Discharge: HOME | End: 2019-09-25
Payer: MEDICARE

## 2019-09-25 VITALS — WEIGHT: 135 LBS | BODY MASS INDEX: 20.53 KG/M2

## 2019-09-25 PROCEDURE — 93798 PHYS/QHP OP CAR RHAB W/ECG: CPT

## 2019-09-27 ENCOUNTER — HOSPITAL ENCOUNTER (OUTPATIENT)
Dept: CARDIAC REHAB | Age: 69
Discharge: HOME | End: 2019-09-27
Payer: MEDICARE

## 2019-09-27 PROCEDURE — 93798 PHYS/QHP OP CAR RHAB W/ECG: CPT

## 2019-10-02 ENCOUNTER — HOSPITAL ENCOUNTER (OUTPATIENT)
Dept: CARDIAC REHAB | Age: 69
Discharge: HOME | End: 2019-10-02
Payer: MEDICARE

## 2019-10-02 VITALS — WEIGHT: 135.2 LBS | BODY MASS INDEX: 20.56 KG/M2

## 2019-10-02 PROCEDURE — 93798 PHYS/QHP OP CAR RHAB W/ECG: CPT

## 2019-10-04 ENCOUNTER — HOSPITAL ENCOUNTER (OUTPATIENT)
Dept: CARDIAC REHAB | Age: 69
Discharge: HOME | End: 2019-10-04
Payer: MEDICARE

## 2019-10-04 PROCEDURE — 93798 PHYS/QHP OP CAR RHAB W/ECG: CPT

## 2019-10-09 ENCOUNTER — HOSPITAL ENCOUNTER (OUTPATIENT)
Dept: CARDIAC REHAB | Age: 69
Discharge: HOME | End: 2019-10-09
Payer: MEDICARE

## 2019-10-09 VITALS — BODY MASS INDEX: 20.65 KG/M2 | WEIGHT: 135.8 LBS

## 2019-10-09 PROCEDURE — 93798 PHYS/QHP OP CAR RHAB W/ECG: CPT

## 2019-10-11 ENCOUNTER — HOSPITAL ENCOUNTER (OUTPATIENT)
Dept: CARDIAC REHAB | Age: 69
Discharge: HOME | End: 2019-10-11
Payer: MEDICARE

## 2019-10-11 PROCEDURE — 93798 PHYS/QHP OP CAR RHAB W/ECG: CPT

## 2019-10-16 ENCOUNTER — HOSPITAL ENCOUNTER (OUTPATIENT)
Dept: CARDIAC REHAB | Age: 69
Discharge: HOME | End: 2019-10-16
Payer: MEDICARE

## 2019-10-16 VITALS — BODY MASS INDEX: 20.59 KG/M2 | WEIGHT: 135.4 LBS

## 2019-10-16 PROCEDURE — 93798 PHYS/QHP OP CAR RHAB W/ECG: CPT

## 2019-10-18 ENCOUNTER — HOSPITAL ENCOUNTER (OUTPATIENT)
Dept: CARDIAC REHAB | Age: 69
Discharge: HOME | End: 2019-10-18
Payer: MEDICARE

## 2019-10-18 PROCEDURE — 93798 PHYS/QHP OP CAR RHAB W/ECG: CPT

## 2019-10-23 ENCOUNTER — HOSPITAL ENCOUNTER (OUTPATIENT)
Dept: ULTRASOUND IMAGING | Age: 69
Discharge: HOME | End: 2019-10-23
Attending: FAMILY MEDICINE
Payer: MEDICARE

## 2019-10-23 ENCOUNTER — HOSPITAL ENCOUNTER (OUTPATIENT)
Dept: CARDIAC REHAB | Age: 69
End: 2019-10-23
Payer: MEDICARE

## 2019-10-23 DIAGNOSIS — I71.40 ABDOMINAL AORTIC ANEURYSM (AAA) WITHOUT RUPTURE: ICD-10-CM

## 2019-10-23 DIAGNOSIS — Z13.6 ENCOUNTER FOR SCREENING FOR CARDIOVASCULAR DISORDERS: ICD-10-CM

## 2019-10-23 DIAGNOSIS — R09.89 LEFT CAROTID BRUIT: ICD-10-CM

## 2019-10-23 PROCEDURE — 93978 VASCULAR STUDY: CPT

## 2019-10-23 PROCEDURE — 93880 EXTRACRANIAL BILAT STUDY: CPT

## 2019-10-23 NOTE — PROGRESS NOTES
Indeed there is significant blockages of the arteries in the neck placing you at increased risk of stroke. We need to refer you to vascular surgeons to discuss management.  I will initiate the referral.

## 2019-10-24 ENCOUNTER — PATIENT OUTREACH (OUTPATIENT)
Dept: CASE MANAGEMENT | Age: 69
End: 2019-10-24

## 2019-10-24 NOTE — PROGRESS NOTES
Complex Case Management  Initial Assessment  Addendum to Connect Care  Note  Utilize questions pertinent to patient    Referral Source & Reason CCM assignment list      Primary concerns per patient and/or pts family/caregiver patient needing some assistance with about who she should call for bad carotid screening      Recent Hospitalization(s)/ED Visits No last hospital visit was in June for chest pain    Current with Home Health?  - Agency? No     Social Needs:  - Able to afford medications? - Financial assessment?  - Access to care? Transportation? Yes   Medicare   No issues with transport  drives self    Nutritional Assessment  - Appetite? - Obesity?  - Failure to Thrive? - Bowels ? Denies any issues   58 136 pounds BMI 20.68  No   Denies any issues at this time   Cognitive Assessment  - History of dementia  - Health literacy    No    Appears to be very knowledgeable about diagnoses      Mobility/Activity Assessment  - Bed/chair bound? - Make use of assistive devices? - Does patient still drive? No   n/a  no    Plan/Interventions/Education  - Follow up Appointments  -   - Referrals   Patient states that cardiac rehab is going great  She stated that a nurse  called her about a bad carotid test she had yesterday and is upset that the PCP has not called her back yet  Instructed patient to call cardiologist and get further recommendations due to patients hx  Very appreciative of my call and stated she would phone her cardiologist  Will f/u with patient tomorrow to make sure she got assistance and advice about carotid test      This note will not be viewable in [address]

## 2019-10-25 ENCOUNTER — HOSPITAL ENCOUNTER (OUTPATIENT)
Dept: CARDIAC REHAB | Age: 69
Discharge: HOME | End: 2019-10-25
Payer: MEDICARE

## 2019-10-25 ENCOUNTER — PATIENT OUTREACH (OUTPATIENT)
Dept: CASE MANAGEMENT | Age: 69
End: 2019-10-25

## 2019-10-25 PROCEDURE — 93798 PHYS/QHP OP CAR RHAB W/ECG: CPT

## 2019-10-25 NOTE — PROGRESS NOTES
Outreached to f/u with patient   Patient stated that Dr. Eve Armenta did return her phone call yesterday afternoon   Patient reported that she went to cardiac rehab this morning  Very happy that Dr. Eve Armenta returned her call yesterday afternoon  Denies any issues at this time  PLAN:  Will outreach to patient again next week  This note will not be viewable in [address]

## 2019-10-29 ENCOUNTER — PATIENT OUTREACH (OUTPATIENT)
Dept: CASE MANAGEMENT | Age: 69
End: 2019-10-29

## 2019-10-29 NOTE — PROGRESS NOTES
Patient received referral to vascular surgeon from Dr. J Luis Trevino related to bad carotid duplex screening  Patient continuing with cardiac rehab  PLAN:  Will f/u with patient next for CCM  This note will not be viewable in [address]

## 2019-10-30 ENCOUNTER — HOSPITAL ENCOUNTER (OUTPATIENT)
Dept: CARDIAC REHAB | Age: 69
Discharge: HOME | End: 2019-10-30
Payer: MEDICARE

## 2019-10-30 VITALS — WEIGHT: 133.2 LBS | BODY MASS INDEX: 20.25 KG/M2

## 2019-10-30 PROCEDURE — 93798 PHYS/QHP OP CAR RHAB W/ECG: CPT

## 2019-11-01 ENCOUNTER — HOSPITAL ENCOUNTER (OUTPATIENT)
Dept: CARDIAC REHAB | Age: 69
Discharge: HOME | End: 2019-11-01
Payer: MEDICARE

## 2019-11-01 VITALS — WEIGHT: 133.2 LBS | BODY MASS INDEX: 20.25 KG/M2

## 2019-11-01 PROCEDURE — 93798 PHYS/QHP OP CAR RHAB W/ECG: CPT

## 2019-11-06 ENCOUNTER — HOSPITAL ENCOUNTER (OUTPATIENT)
Dept: CARDIAC REHAB | Age: 69
Discharge: HOME | End: 2019-11-06
Payer: MEDICARE

## 2019-11-06 ENCOUNTER — HOSPITAL ENCOUNTER (OUTPATIENT)
Dept: CT IMAGING | Age: 69
Discharge: HOME | End: 2019-11-06
Attending: FAMILY MEDICINE
Payer: MEDICARE

## 2019-11-06 VITALS — WEIGHT: 133 LBS | BODY MASS INDEX: 20.16 KG/M2 | HEIGHT: 68 IN

## 2019-11-06 VITALS — BODY MASS INDEX: 20.34 KG/M2 | WEIGHT: 133.8 LBS

## 2019-11-06 DIAGNOSIS — I65.23 BILATERAL CAROTID ARTERY STENOSIS: ICD-10-CM

## 2019-11-06 LAB — CREAT BLD-MCNC: 0.9 MG/DL (ref 0.8–1.5)

## 2019-11-06 PROCEDURE — 74011636320 HC RX REV CODE- 636/320: Performed by: FAMILY MEDICINE

## 2019-11-06 PROCEDURE — 93798 PHYS/QHP OP CAR RHAB W/ECG: CPT

## 2019-11-06 PROCEDURE — 82565 ASSAY OF CREATININE: CPT

## 2019-11-06 PROCEDURE — 74011000258 HC RX REV CODE- 258: Performed by: FAMILY MEDICINE

## 2019-11-06 PROCEDURE — 70498 CT ANGIOGRAPHY NECK: CPT

## 2019-11-06 RX ORDER — SODIUM CHLORIDE 0.9 % (FLUSH) 0.9 %
10 SYRINGE (ML) INJECTION
Status: COMPLETED | OUTPATIENT
Start: 2019-11-06 | End: 2019-11-06

## 2019-11-06 RX ADMIN — SODIUM CHLORIDE 100 ML: 900 INJECTION, SOLUTION INTRAVENOUS at 14:24

## 2019-11-06 RX ADMIN — IOPAMIDOL 80 ML: 755 INJECTION, SOLUTION INTRAVENOUS at 14:23

## 2019-11-06 RX ADMIN — Medication 10 ML: at 14:24

## 2019-11-07 NOTE — PROGRESS NOTES
Mrs. Ari Sep,  As anticipated the additional artery study confirms significant obstruction in the arteries to the brain. Surgery is not always required. I will try the referral to vascular specialist again. Ty Jeff, this requirement of a CTA before being seen is new. The referral is still active so I do not know how we make sure the patient is seen. Does your office have a mechanism in place to follow up on the referrals when the CTA was required?    Thanks,  Tlaita Vallecillo

## 2019-11-08 ENCOUNTER — PATIENT OUTREACH (OUTPATIENT)
Dept: CASE MANAGEMENT | Age: 69
End: 2019-11-08

## 2019-11-08 ENCOUNTER — HOSPITAL ENCOUNTER (OUTPATIENT)
Dept: CARDIAC REHAB | Age: 69
Discharge: HOME | End: 2019-11-08
Payer: MEDICARE

## 2019-11-08 PROCEDURE — 93798 PHYS/QHP OP CAR RHAB W/ECG: CPT

## 2019-11-08 NOTE — PROGRESS NOTES
Attempted outreach to f/u with patient - UTR at this time   Per connect care patient has apt with cardiac rehab and pulmonary today   PLAN:  Will attempt outreach to patient again next week  This note will not be viewable in [address]

## 2019-11-13 ENCOUNTER — HOSPITAL ENCOUNTER (OUTPATIENT)
Dept: CARDIAC REHAB | Age: 69
End: 2019-11-13
Payer: MEDICARE

## 2019-11-15 ENCOUNTER — PATIENT OUTREACH (OUTPATIENT)
Dept: CASE MANAGEMENT | Age: 69
End: 2019-11-15

## 2019-11-15 ENCOUNTER — HOSPITAL ENCOUNTER (OUTPATIENT)
Dept: CARDIAC REHAB | Age: 69
Discharge: HOME | End: 2019-11-15
Payer: MEDICARE

## 2019-11-15 VITALS — BODY MASS INDEX: 20.1 KG/M2 | WEIGHT: 132.2 LBS

## 2019-11-15 PROCEDURE — 93798 PHYS/QHP OP CAR RHAB W/ECG: CPT

## 2019-11-20 ENCOUNTER — HOSPITAL ENCOUNTER (OUTPATIENT)
Dept: CARDIAC REHAB | Age: 69
Discharge: HOME | End: 2019-11-20
Payer: MEDICARE

## 2019-11-20 VITALS — BODY MASS INDEX: 20.04 KG/M2 | WEIGHT: 131.8 LBS

## 2019-11-20 PROCEDURE — 93798 PHYS/QHP OP CAR RHAB W/ECG: CPT

## 2019-11-22 ENCOUNTER — HOSPITAL ENCOUNTER (OUTPATIENT)
Dept: CARDIAC REHAB | Age: 69
Discharge: HOME | End: 2019-11-22
Payer: MEDICARE

## 2019-11-22 PROCEDURE — 93798 PHYS/QHP OP CAR RHAB W/ECG: CPT

## 2019-11-25 ENCOUNTER — ANESTHESIA EVENT (OUTPATIENT)
Dept: SURGERY | Age: 69
DRG: 039 | End: 2019-11-25
Payer: MEDICARE

## 2019-11-25 ENCOUNTER — HOSPITAL ENCOUNTER (OUTPATIENT)
Dept: SURGERY | Age: 69
Discharge: HOME | End: 2019-11-25
Payer: MEDICARE

## 2019-11-25 VITALS
HEART RATE: 74 BPM | TEMPERATURE: 98.1 F | RESPIRATION RATE: 18 BRPM | SYSTOLIC BLOOD PRESSURE: 91 MMHG | DIASTOLIC BLOOD PRESSURE: 47 MMHG | WEIGHT: 133.19 LBS | OXYGEN SATURATION: 98 % | HEIGHT: 68 IN | BODY MASS INDEX: 20.18 KG/M2

## 2019-11-25 LAB
ANION GAP SERPL CALC-SCNC: 5 MMOL/L (ref 7–16)
BASOPHILS # BLD: 0 K/UL (ref 0–0.2)
BASOPHILS NFR BLD: 1 % (ref 0–2)
BUN SERPL-MCNC: 15 MG/DL (ref 8–23)
CALCIUM SERPL-MCNC: 9.3 MG/DL (ref 8.3–10.4)
CHLORIDE SERPL-SCNC: 103 MMOL/L (ref 98–107)
CO2 SERPL-SCNC: 30 MMOL/L (ref 21–32)
CREAT SERPL-MCNC: 0.84 MG/DL (ref 0.6–1)
DIFFERENTIAL METHOD BLD: ABNORMAL
EOSINOPHIL # BLD: 0.1 K/UL (ref 0–0.8)
EOSINOPHIL NFR BLD: 2 % (ref 0.5–7.8)
ERYTHROCYTE [DISTWIDTH] IN BLOOD BY AUTOMATED COUNT: 14.6 % (ref 11.9–14.6)
GLUCOSE SERPL-MCNC: 90 MG/DL (ref 65–100)
HCT VFR BLD AUTO: 37.8 % (ref 35.8–46.3)
HGB BLD-MCNC: 12.2 G/DL (ref 11.7–15.4)
IMM GRANULOCYTES # BLD AUTO: 0 K/UL (ref 0–0.5)
IMM GRANULOCYTES NFR BLD AUTO: 0 % (ref 0–5)
LYMPHOCYTES # BLD: 0.7 K/UL (ref 0.5–4.6)
LYMPHOCYTES NFR BLD: 12 % (ref 13–44)
MCH RBC QN AUTO: 29.2 PG (ref 26.1–32.9)
MCHC RBC AUTO-ENTMCNC: 32.3 G/DL (ref 31.4–35)
MCV RBC AUTO: 90.4 FL (ref 79.6–97.8)
MONOCYTES # BLD: 0.7 K/UL (ref 0.1–1.3)
MONOCYTES NFR BLD: 12 % (ref 4–12)
NEUTS SEG # BLD: 4.4 K/UL (ref 1.7–8.2)
NEUTS SEG NFR BLD: 74 % (ref 43–78)
NRBC # BLD: 0 K/UL (ref 0–0.2)
PLATELET # BLD AUTO: 263 K/UL (ref 150–450)
PMV BLD AUTO: 9.8 FL (ref 9.4–12.3)
POTASSIUM SERPL-SCNC: 4.7 MMOL/L (ref 3.5–5.1)
RBC # BLD AUTO: 4.18 M/UL (ref 4.05–5.2)
SODIUM SERPL-SCNC: 138 MMOL/L (ref 136–145)
WBC # BLD AUTO: 6 K/UL (ref 4.3–11.1)

## 2019-11-25 PROCEDURE — 85025 COMPLETE CBC W/AUTO DIFF WBC: CPT

## 2019-11-25 PROCEDURE — 80048 BASIC METABOLIC PNL TOTAL CA: CPT

## 2019-11-25 RX ORDER — MONTELUKAST SODIUM 10 MG/1
10 TABLET ORAL DAILY
COMMUNITY
End: 2020-04-22 | Stop reason: ALTCHOICE

## 2019-11-25 NOTE — PERIOP NOTES
Patient verified name and . Patient provided medical/health information and PTA medications to the best of their ability. TYPE  CASE:3  Order for consent  found in EHR and matches case posting. Labs per surgeon:cbc,bmp. Results: pending  Labs per anesthesia protocol: cbc,bmp T/S DOS. Results pending  EKG  :  19  Dr Alma Delia Frances in to see pt. Chart reviewed and pt approved for surgery. Patient provided with and instructed on education handouts including Guide to Surgery, blood transfusions, pain management, and hand hygiene for the family and community, and [facility] brochure. Hibiclens and instructions given per hospital policy. Instructed patient to continue previous medications as prescribed prior to surgery unless otherwise directed and to take the following medications the day of surgery according to anesthesia guidelines : Albuterol, ASA 81 mg, symbicort, metoprolol, Singular, Pantoprazole, diliresp, Spiriva . Instructed patient to hold  the following medications: Plavix per Dr Iain Hutner, multivitamin. .    Original medication prescription bottles not visualized during patient appointment. Patient teach back successful and patient demonstrates knowledge of instruction.

## 2019-11-25 NOTE — PERIOP NOTES
Recent Results (from the past 12 hour(s))   CBC WITH AUTOMATED DIFF    Collection Time: 11/25/19 12:04 PM   Result Value Ref Range    WBC 6.0 4.3 - 11.1 K/uL    RBC 4.18 4.05 - 5.2 M/uL    HGB 12.2 11.7 - 15.4 g/dL    HCT 37.8 35.8 - 46.3 %    MCV 90.4 79.6 - 97.8 FL    MCH 29.2 26.1 - 32.9 PG    MCHC 32.3 31.4 - 35.0 g/dL    RDW 14.6 11.9 - 14.6 %    PLATELET 871 546 - 820 K/uL    MPV 9.8 9.4 - 12.3 FL    ABSOLUTE NRBC 0.00 0.0 - 0.2 K/uL    DF AUTOMATED      NEUTROPHILS 74 43 - 78 %    LYMPHOCYTES 12 (L) 13 - 44 %    MONOCYTES 12 4.0 - 12.0 %    EOSINOPHILS 2 0.5 - 7.8 %    BASOPHILS 1 0.0 - 2.0 %    IMMATURE GRANULOCYTES 0 0.0 - 5.0 %    ABS. NEUTROPHILS 4.4 1.7 - 8.2 K/UL    ABS. LYMPHOCYTES 0.7 0.5 - 4.6 K/UL    ABS. MONOCYTES 0.7 0.1 - 1.3 K/UL    ABS. EOSINOPHILS 0.1 0.0 - 0.8 K/UL    ABS. BASOPHILS 0.0 0.0 - 0.2 K/UL    ABS. IMM.  GRANS. 0.0 0.0 - 0.5 K/UL   METABOLIC PANEL, BASIC    Collection Time: 11/25/19 12:04 PM   Result Value Ref Range    Sodium 138 136 - 145 mmol/L    Potassium 4.7 3.5 - 5.1 mmol/L    Chloride 103 98 - 107 mmol/L    CO2 30 21 - 32 mmol/L    Anion gap 5 (L) 7 - 16 mmol/L    Glucose 90 65 - 100 mg/dL    BUN 15 8 - 23 MG/DL    Creatinine 0.84 0.6 - 1.0 MG/DL    GFR est AA >60 >60 ml/min/1.73m2    GFR est non-AA >60 >60 ml/min/1.73m2    Calcium 9.3 8.3 - 10.4 MG/DL   Reviewed

## 2019-11-25 NOTE — ANESTHESIA PREPROCEDURE EVALUATION
Relevant Problems   No relevant active problems       Anesthetic History     PONV          Review of Systems / Medical History  Patient summary reviewed and pertinent labs reviewed    Pulmonary    COPD: moderate          Pertinent negatives: No smoker (prior)     Neuro/Psych         Psychiatric history     Cardiovascular    Hypertension          Past MI, CAD, PAD (carotid stenosis found by hearing a bruit) and cardiac stents    Exercise tolerance: >4 METS  Comments: Reported normal LV function with cath 5/31/19, new RCA stent then, prior >10 stents, occluded LCx, open LAD and RCA with multiple stents   GI/Hepatic/Renal                Endo/Other      Hypothyroidism       Other Findings            Physical Exam    Airway  Mallampati: II  TM Distance: 4 - 6 cm  Neck ROM: normal range of motion   Mouth opening: Normal     Cardiovascular    Rhythm: regular  Rate: normal         Dental    Dentition: Caps/crowns and Bridges     Pulmonary  Breath sounds clear to auscultation               Abdominal         Other Findings            Anesthetic Plan    ASA: 3  Anesthesia type: general    Monitoring Plan: Arterial line      Induction: Intravenous  Anesthetic plan and risks discussed with: Patient

## 2019-12-02 RX ORDER — OXYCODONE HYDROCHLORIDE 5 MG/1
10 TABLET ORAL
Status: CANCELLED | OUTPATIENT
Start: 2019-12-02 | End: 2019-12-03

## 2019-12-02 RX ORDER — HYDROMORPHONE HYDROCHLORIDE 2 MG/ML
0.5 INJECTION, SOLUTION INTRAMUSCULAR; INTRAVENOUS; SUBCUTANEOUS
Status: CANCELLED | OUTPATIENT
Start: 2019-12-02

## 2019-12-02 RX ORDER — SODIUM CHLORIDE, SODIUM LACTATE, POTASSIUM CHLORIDE, CALCIUM CHLORIDE 600; 310; 30; 20 MG/100ML; MG/100ML; MG/100ML; MG/100ML
100 INJECTION, SOLUTION INTRAVENOUS CONTINUOUS
Status: CANCELLED | OUTPATIENT
Start: 2019-12-02 | End: 2019-12-02

## 2019-12-03 ENCOUNTER — ANESTHESIA (OUTPATIENT)
Dept: SURGERY | Age: 69
DRG: 039 | End: 2019-12-03
Payer: MEDICARE

## 2019-12-03 ENCOUNTER — HOSPITAL ENCOUNTER (INPATIENT)
Age: 69
LOS: 1 days | Discharge: HOME | DRG: 039 | End: 2019-12-04
Attending: SURGERY | Admitting: SURGERY
Payer: MEDICARE

## 2019-12-03 DIAGNOSIS — Z98.890 S/P CAROTID ENDARTERECTOMY: Primary | ICD-10-CM

## 2019-12-03 PROBLEM — I65.21 CAROTID STENOSIS, RIGHT: Status: ACTIVE | Noted: 2019-12-03

## 2019-12-03 PROBLEM — I65.29 CAROTID STENOSIS: Status: ACTIVE | Noted: 2019-12-03

## 2019-12-03 LAB
ABO + RH BLD: NORMAL
ACT BLD: 235 SECS (ref 70–128)
BLOOD GROUP ANTIBODIES SERPL: NORMAL
SPECIMEN EXP DATE BLD: NORMAL

## 2019-12-03 PROCEDURE — 74011000250 HC RX REV CODE- 250

## 2019-12-03 PROCEDURE — 76060000037 HC ANESTHESIA 3 TO 3.5 HR: Performed by: SURGERY

## 2019-12-03 PROCEDURE — 77030002986 HC SUT PROL J&J -A: Performed by: SURGERY

## 2019-12-03 PROCEDURE — 74011250637 HC RX REV CODE- 250/637: Performed by: PHYSICIAN ASSISTANT

## 2019-12-03 PROCEDURE — 77030020245 HC SOL INJ 5% D/0.9%NACL

## 2019-12-03 PROCEDURE — 74011250636 HC RX REV CODE- 250/636: Performed by: SURGERY

## 2019-12-03 PROCEDURE — 77030018673: Performed by: SURGERY

## 2019-12-03 PROCEDURE — 77030040506 HC DRN WND MDII -A: Performed by: SURGERY

## 2019-12-03 PROCEDURE — 77030037088 HC TUBE ENDOTRACH ORAL NSL COVD-A: Performed by: ANESTHESIOLOGY

## 2019-12-03 PROCEDURE — 74011250637 HC RX REV CODE- 250/637: Performed by: SURGERY

## 2019-12-03 PROCEDURE — 77030010512 HC APPL CLP LIG J&J -C: Performed by: SURGERY

## 2019-12-03 PROCEDURE — 85347 COAGULATION TIME ACTIVATED: CPT

## 2019-12-03 PROCEDURE — 74011000250 HC RX REV CODE- 250: Performed by: SURGERY

## 2019-12-03 PROCEDURE — 77030018836 HC SOL IRR NACL ICUM -A: Performed by: SURGERY

## 2019-12-03 PROCEDURE — 77030040361 HC SLV COMPR DVT MDII -B: Performed by: SURGERY

## 2019-12-03 PROCEDURE — 77030002996 HC SUT SLK J&J -A: Performed by: SURGERY

## 2019-12-03 PROCEDURE — 77030002519 HC INSRT CLMP FIBRA STLTH AMR -B: Performed by: SURGERY

## 2019-12-03 PROCEDURE — 77030039425 HC BLD LARYNG TRULITE DISP TELE -A: Performed by: ANESTHESIOLOGY

## 2019-12-03 PROCEDURE — 74011250636 HC RX REV CODE- 250/636: Performed by: NURSE ANESTHETIST, CERTIFIED REGISTERED

## 2019-12-03 PROCEDURE — 76010000172 HC OR TIME 2.5 TO 3 HR INTENSV-TIER 1: Performed by: SURGERY

## 2019-12-03 PROCEDURE — 03UK0JZ SUPPLEMENT RIGHT INTERNAL CAROTID ARTERY WITH SYNTHETIC SUBSTITUTE, OPEN APPROACH: ICD-10-PCS | Performed by: SURGERY

## 2019-12-03 PROCEDURE — 77030039266 HC ADH SKN EXOFIN S2SG -A: Performed by: SURGERY

## 2019-12-03 PROCEDURE — 74011250636 HC RX REV CODE- 250/636: Performed by: ANESTHESIOLOGY

## 2019-12-03 PROCEDURE — 77030040922 HC BLNKT HYPOTHRM STRY -A: Performed by: ANESTHESIOLOGY

## 2019-12-03 PROCEDURE — 74011000258 HC RX REV CODE- 258: Performed by: SURGERY

## 2019-12-03 PROCEDURE — C1768 GRAFT, VASCULAR: HCPCS | Performed by: SURGERY

## 2019-12-03 PROCEDURE — 86900 BLOOD TYPING SEROLOGIC ABO: CPT

## 2019-12-03 PROCEDURE — 77030034888 HC SUT PROL 2 J&J -B: Performed by: SURGERY

## 2019-12-03 PROCEDURE — 03CK0ZZ EXTIRPATION OF MATTER FROM RIGHT INTERNAL CAROTID ARTERY, OPEN APPROACH: ICD-10-PCS | Performed by: SURGERY

## 2019-12-03 PROCEDURE — 77030019908 HC STETH ESOPH SIMS -A: Performed by: ANESTHESIOLOGY

## 2019-12-03 PROCEDURE — 77030031139 HC SUT VCRL2 J&J -A: Performed by: SURGERY

## 2019-12-03 PROCEDURE — 74011000250 HC RX REV CODE- 250: Performed by: NURSE ANESTHETIST, CERTIFIED REGISTERED

## 2019-12-03 PROCEDURE — 77030005401 HC CATH RAD ARRO -A: Performed by: ANESTHESIOLOGY

## 2019-12-03 PROCEDURE — 77030018824 HC SHNT CAR ARGY COVD -B: Performed by: SURGERY

## 2019-12-03 PROCEDURE — 77030013794 HC KT TRNSDUC BLD EDWD -B: Performed by: ANESTHESIOLOGY

## 2019-12-03 PROCEDURE — 77030040356 HC CORD BPLR FRCP COVD -A: Performed by: SURGERY

## 2019-12-03 PROCEDURE — 77030012407 HC DRN WND BARD -B: Performed by: SURGERY

## 2019-12-03 PROCEDURE — 77030020407 HC IV BLD WRMR ST 3M -A: Performed by: ANESTHESIOLOGY

## 2019-12-03 PROCEDURE — 77030013292 HC BOWL MX PRSM J&J -A: Performed by: ANESTHESIOLOGY

## 2019-12-03 PROCEDURE — 65610000006 HC RM INTENSIVE CARE

## 2019-12-03 DEVICE — XENOSURE BIOLOGIC PATCH, 0.8CM X 8CM, EIFU
Type: IMPLANTABLE DEVICE | Site: CAROTID | Status: FUNCTIONAL
Brand: XENOSURE BIOLOGIC PATCH

## 2019-12-03 RX ORDER — DEXTROSE MONOHYDRATE AND SODIUM CHLORIDE 5; .9 G/100ML; G/100ML
50 INJECTION, SOLUTION INTRAVENOUS CONTINUOUS
Status: DISCONTINUED | OUTPATIENT
Start: 2019-12-03 | End: 2019-12-04 | Stop reason: HOSPADM

## 2019-12-03 RX ORDER — PANTOPRAZOLE SODIUM 40 MG/1
40 TABLET, DELAYED RELEASE ORAL 2 TIMES DAILY
Status: DISCONTINUED | OUTPATIENT
Start: 2019-12-03 | End: 2019-12-04 | Stop reason: HOSPADM

## 2019-12-03 RX ORDER — OXYCODONE AND ACETAMINOPHEN 5; 325 MG/1; MG/1
1 TABLET ORAL
Status: DISCONTINUED | OUTPATIENT
Start: 2019-12-03 | End: 2019-12-03

## 2019-12-03 RX ORDER — CEFAZOLIN SODIUM/WATER 2 G/20 ML
2 SYRINGE (ML) INTRAVENOUS ONCE
Status: COMPLETED | OUTPATIENT
Start: 2019-12-03 | End: 2019-12-03

## 2019-12-03 RX ORDER — SODIUM CHLORIDE 0.9 % (FLUSH) 0.9 %
5-40 SYRINGE (ML) INJECTION EVERY 8 HOURS
Status: DISCONTINUED | OUTPATIENT
Start: 2019-12-03 | End: 2019-12-03 | Stop reason: SDUPTHER

## 2019-12-03 RX ORDER — DOPAMINE HYDROCHLORIDE 320 MG/100ML
5 INJECTION, SOLUTION INTRAVENOUS
Status: DISCONTINUED | OUTPATIENT
Start: 2019-12-03 | End: 2019-12-04 | Stop reason: HOSPADM

## 2019-12-03 RX ORDER — SODIUM CHLORIDE 0.9 % (FLUSH) 0.9 %
5-40 SYRINGE (ML) INJECTION AS NEEDED
Status: DISCONTINUED | OUTPATIENT
Start: 2019-12-03 | End: 2019-12-04 | Stop reason: HOSPADM

## 2019-12-03 RX ORDER — FENTANYL CITRATE 50 UG/ML
INJECTION, SOLUTION INTRAMUSCULAR; INTRAVENOUS AS NEEDED
Status: DISCONTINUED | OUTPATIENT
Start: 2019-12-03 | End: 2019-12-03 | Stop reason: HOSPADM

## 2019-12-03 RX ORDER — FENTANYL CITRATE 50 UG/ML
100 INJECTION, SOLUTION INTRAMUSCULAR; INTRAVENOUS ONCE
Status: ACTIVE | OUTPATIENT
Start: 2019-12-03 | End: 2019-12-03

## 2019-12-03 RX ORDER — ALBUTEROL SULFATE 0.83 MG/ML
2.5 SOLUTION RESPIRATORY (INHALATION)
Status: DISCONTINUED | OUTPATIENT
Start: 2019-12-03 | End: 2019-12-04 | Stop reason: HOSPADM

## 2019-12-03 RX ORDER — ONDANSETRON 2 MG/ML
4 INJECTION INTRAMUSCULAR; INTRAVENOUS
Status: DISCONTINUED | OUTPATIENT
Start: 2019-12-03 | End: 2019-12-04 | Stop reason: HOSPADM

## 2019-12-03 RX ORDER — LIDOCAINE HYDROCHLORIDE 20 MG/ML
INJECTION, SOLUTION EPIDURAL; INFILTRATION; INTRACAUDAL; PERINEURAL AS NEEDED
Status: DISCONTINUED | OUTPATIENT
Start: 2019-12-03 | End: 2019-12-03 | Stop reason: HOSPADM

## 2019-12-03 RX ORDER — CEFAZOLIN SODIUM/WATER 2 G/20 ML
2 SYRINGE (ML) INTRAVENOUS EVERY 8 HOURS
Status: COMPLETED | OUTPATIENT
Start: 2019-12-03 | End: 2019-12-04

## 2019-12-03 RX ORDER — ASPIRIN 81 MG/1
81 TABLET ORAL DAILY
Status: DISCONTINUED | OUTPATIENT
Start: 2019-12-03 | End: 2019-12-04 | Stop reason: HOSPADM

## 2019-12-03 RX ORDER — HEPARIN SODIUM 5000 [USP'U]/ML
INJECTION, SOLUTION INTRAVENOUS; SUBCUTANEOUS AS NEEDED
Status: DISCONTINUED | OUTPATIENT
Start: 2019-12-03 | End: 2019-12-03 | Stop reason: HOSPADM

## 2019-12-03 RX ORDER — LIDOCAINE HYDROCHLORIDE 10 MG/ML
INJECTION INFILTRATION; PERINEURAL AS NEEDED
Status: DISCONTINUED | OUTPATIENT
Start: 2019-12-03 | End: 2019-12-03 | Stop reason: HOSPADM

## 2019-12-03 RX ORDER — GLYCOPYRROLATE 0.2 MG/ML
INJECTION INTRAMUSCULAR; INTRAVENOUS AS NEEDED
Status: DISCONTINUED | OUTPATIENT
Start: 2019-12-03 | End: 2019-12-03 | Stop reason: HOSPADM

## 2019-12-03 RX ORDER — PROTAMINE SULFATE 10 MG/ML
INJECTION, SOLUTION INTRAVENOUS AS NEEDED
Status: DISCONTINUED | OUTPATIENT
Start: 2019-12-03 | End: 2019-12-03 | Stop reason: HOSPADM

## 2019-12-03 RX ORDER — NEOSTIGMINE METHYLSULFATE 1 MG/ML
INJECTION, SOLUTION INTRAVENOUS AS NEEDED
Status: DISCONTINUED | OUTPATIENT
Start: 2019-12-03 | End: 2019-12-03 | Stop reason: HOSPADM

## 2019-12-03 RX ORDER — EPHEDRINE SULFATE/0.9% NACL/PF 50 MG/5 ML
SYRINGE (ML) INTRAVENOUS AS NEEDED
Status: DISCONTINUED | OUTPATIENT
Start: 2019-12-03 | End: 2019-12-03 | Stop reason: HOSPADM

## 2019-12-03 RX ORDER — SODIUM CHLORIDE 9 MG/ML
INJECTION, SOLUTION INTRAVENOUS
Status: DISCONTINUED | OUTPATIENT
Start: 2019-12-03 | End: 2019-12-03 | Stop reason: HOSPADM

## 2019-12-03 RX ORDER — NICARDIPINE HYDROCHLORIDE 0.1 MG/ML
INJECTION INTRAVENOUS
Status: DISCONTINUED | OUTPATIENT
Start: 2019-12-03 | End: 2019-12-03 | Stop reason: HOSPADM

## 2019-12-03 RX ORDER — SODIUM CHLORIDE, SODIUM LACTATE, POTASSIUM CHLORIDE, CALCIUM CHLORIDE 600; 310; 30; 20 MG/100ML; MG/100ML; MG/100ML; MG/100ML
100 INJECTION, SOLUTION INTRAVENOUS CONTINUOUS
Status: DISCONTINUED | OUTPATIENT
Start: 2019-12-03 | End: 2019-12-03

## 2019-12-03 RX ORDER — MORPHINE SULFATE 10 MG/ML
5 INJECTION, SOLUTION INTRAMUSCULAR; INTRAVENOUS
Status: DISCONTINUED | OUTPATIENT
Start: 2019-12-03 | End: 2019-12-04 | Stop reason: HOSPADM

## 2019-12-03 RX ORDER — ATORVASTATIN CALCIUM 40 MG/1
80 TABLET, FILM COATED ORAL
Status: DISCONTINUED | OUTPATIENT
Start: 2019-12-03 | End: 2019-12-04 | Stop reason: HOSPADM

## 2019-12-03 RX ORDER — ACETAMINOPHEN 325 MG/1
650 TABLET ORAL
Status: DISCONTINUED | OUTPATIENT
Start: 2019-12-03 | End: 2019-12-04 | Stop reason: HOSPADM

## 2019-12-03 RX ORDER — PROPOFOL 10 MG/ML
INJECTION, EMULSION INTRAVENOUS AS NEEDED
Status: DISCONTINUED | OUTPATIENT
Start: 2019-12-03 | End: 2019-12-03 | Stop reason: HOSPADM

## 2019-12-03 RX ORDER — LIDOCAINE HYDROCHLORIDE 10 MG/ML
0.3 INJECTION INFILTRATION; PERINEURAL ONCE
Status: DISCONTINUED | OUTPATIENT
Start: 2019-12-03 | End: 2019-12-03

## 2019-12-03 RX ORDER — TRAMADOL HYDROCHLORIDE 50 MG/1
50 TABLET ORAL
Status: DISCONTINUED | OUTPATIENT
Start: 2019-12-03 | End: 2019-12-04 | Stop reason: HOSPADM

## 2019-12-03 RX ORDER — HEPARIN SODIUM 1000 [USP'U]/ML
INJECTION, SOLUTION INTRAVENOUS; SUBCUTANEOUS AS NEEDED
Status: DISCONTINUED | OUTPATIENT
Start: 2019-12-03 | End: 2019-12-03 | Stop reason: HOSPADM

## 2019-12-03 RX ORDER — DEXAMETHASONE SODIUM PHOSPHATE 4 MG/ML
INJECTION, SOLUTION INTRA-ARTICULAR; INTRALESIONAL; INTRAMUSCULAR; INTRAVENOUS; SOFT TISSUE AS NEEDED
Status: DISCONTINUED | OUTPATIENT
Start: 2019-12-03 | End: 2019-12-03 | Stop reason: HOSPADM

## 2019-12-03 RX ORDER — SODIUM CHLORIDE 0.9 % (FLUSH) 0.9 %
5-40 SYRINGE (ML) INJECTION AS NEEDED
Status: DISCONTINUED | OUTPATIENT
Start: 2019-12-03 | End: 2019-12-03 | Stop reason: SDUPTHER

## 2019-12-03 RX ORDER — BUPIVACAINE HYDROCHLORIDE 2.5 MG/ML
INJECTION, SOLUTION EPIDURAL; INFILTRATION; INTRACAUDAL AS NEEDED
Status: DISCONTINUED | OUTPATIENT
Start: 2019-12-03 | End: 2019-12-03 | Stop reason: HOSPADM

## 2019-12-03 RX ORDER — NALOXONE HYDROCHLORIDE 0.4 MG/ML
0.4 INJECTION, SOLUTION INTRAMUSCULAR; INTRAVENOUS; SUBCUTANEOUS AS NEEDED
Status: DISCONTINUED | OUTPATIENT
Start: 2019-12-03 | End: 2019-12-04 | Stop reason: HOSPADM

## 2019-12-03 RX ORDER — SODIUM CHLORIDE 0.9 % (FLUSH) 0.9 %
5-40 SYRINGE (ML) INJECTION EVERY 8 HOURS
Status: DISCONTINUED | OUTPATIENT
Start: 2019-12-03 | End: 2019-12-04 | Stop reason: HOSPADM

## 2019-12-03 RX ORDER — ONDANSETRON 2 MG/ML
INJECTION INTRAMUSCULAR; INTRAVENOUS
Status: ACTIVE
Start: 2019-12-03 | End: 2019-12-03

## 2019-12-03 RX ORDER — ROCURONIUM BROMIDE 10 MG/ML
INJECTION, SOLUTION INTRAVENOUS AS NEEDED
Status: DISCONTINUED | OUTPATIENT
Start: 2019-12-03 | End: 2019-12-03 | Stop reason: HOSPADM

## 2019-12-03 RX ORDER — ONDANSETRON 2 MG/ML
INJECTION INTRAMUSCULAR; INTRAVENOUS AS NEEDED
Status: DISCONTINUED | OUTPATIENT
Start: 2019-12-03 | End: 2019-12-03 | Stop reason: HOSPADM

## 2019-12-03 RX ADMIN — ATORVASTATIN CALCIUM 80 MG: 40 TABLET, FILM COATED ORAL at 20:58

## 2019-12-03 RX ADMIN — PROPOFOL 150 MG: 10 INJECTION, EMULSION INTRAVENOUS at 08:04

## 2019-12-03 RX ADMIN — NICARDIPINE HYDROCHLORIDE 5 MG/HR: 0.1 INJECTION INTRAVENOUS at 10:09

## 2019-12-03 RX ADMIN — ONDANSETRON 4 MG: 2 INJECTION INTRAMUSCULAR; INTRAVENOUS at 10:58

## 2019-12-03 RX ADMIN — Medication 10 ML: at 15:19

## 2019-12-03 RX ADMIN — Medication 2 G: at 08:29

## 2019-12-03 RX ADMIN — PANTOPRAZOLE SODIUM 40 MG: 40 TABLET, DELAYED RELEASE ORAL at 20:58

## 2019-12-03 RX ADMIN — DEXTRAN 40 50 ML/HR: 10 INJECTION, SOLUTION INTRAVENOUS at 08:20

## 2019-12-03 RX ADMIN — PHENYLEPHRINE HYDROCHLORIDE 10 MCG/MIN: 10 INJECTION INTRAVENOUS at 08:27

## 2019-12-03 RX ADMIN — FENTANYL CITRATE 50 MCG: 50 INJECTION INTRAMUSCULAR; INTRAVENOUS at 08:04

## 2019-12-03 RX ADMIN — DEXTROSE MONOHYDRATE AND SODIUM CHLORIDE 50 ML/HR: 5; .9 INJECTION, SOLUTION INTRAVENOUS at 10:52

## 2019-12-03 RX ADMIN — HEPARIN SODIUM 7000 UNITS: 1000 INJECTION INTRAVENOUS; SUBCUTANEOUS at 08:51

## 2019-12-03 RX ADMIN — ACETAMINOPHEN 650 MG: 325 TABLET, FILM COATED ORAL at 15:18

## 2019-12-03 RX ADMIN — DEXAMETHASONE SODIUM PHOSPHATE 4 MG: 4 INJECTION, SOLUTION INTRAMUSCULAR; INTRAVENOUS at 08:30

## 2019-12-03 RX ADMIN — SODIUM CHLORIDE, SODIUM LACTATE, POTASSIUM CHLORIDE, AND CALCIUM CHLORIDE 100 ML/HR: 600; 310; 30; 20 INJECTION, SOLUTION INTRAVENOUS at 06:30

## 2019-12-03 RX ADMIN — ROCURONIUM BROMIDE 10 MG: 10 INJECTION, SOLUTION INTRAVENOUS at 08:53

## 2019-12-03 RX ADMIN — SODIUM CHLORIDE: 900 INJECTION, SOLUTION INTRAVENOUS at 08:20

## 2019-12-03 RX ADMIN — LIDOCAINE HYDROCHLORIDE 60 MG: 20 INJECTION, SOLUTION EPIDURAL; INFILTRATION; INTRACAUDAL; PERINEURAL at 08:04

## 2019-12-03 RX ADMIN — Medication 10 MG: at 08:26

## 2019-12-03 RX ADMIN — ROCURONIUM BROMIDE 10 MG: 10 INJECTION, SOLUTION INTRAVENOUS at 08:30

## 2019-12-03 RX ADMIN — ACETAMINOPHEN 650 MG: 325 TABLET, FILM COATED ORAL at 11:20

## 2019-12-03 RX ADMIN — GLYCOPYRROLATE 0.4 MG: 0.2 INJECTION, SOLUTION INTRAMUSCULAR; INTRAVENOUS at 10:14

## 2019-12-03 RX ADMIN — Medication 10 ML: at 21:04

## 2019-12-03 RX ADMIN — TRAMADOL HYDROCHLORIDE 50 MG: 50 TABLET, FILM COATED ORAL at 20:58

## 2019-12-03 RX ADMIN — Medication 3 MG: at 10:14

## 2019-12-03 RX ADMIN — ONDANSETRON 4 MG: 2 INJECTION INTRAMUSCULAR; INTRAVENOUS at 10:14

## 2019-12-03 RX ADMIN — FENTANYL CITRATE 50 MCG: 50 INJECTION INTRAMUSCULAR; INTRAVENOUS at 08:33

## 2019-12-03 RX ADMIN — HEPARIN SODIUM 4000 UNITS: 1000 INJECTION INTRAVENOUS; SUBCUTANEOUS at 09:45

## 2019-12-03 RX ADMIN — SODIUM CHLORIDE, SODIUM LACTATE, POTASSIUM CHLORIDE, AND CALCIUM CHLORIDE: 600; 310; 30; 20 INJECTION, SOLUTION INTRAVENOUS at 08:41

## 2019-12-03 RX ADMIN — Medication 2 G: at 15:18

## 2019-12-03 RX ADMIN — PROTAMINE SULFATE 50 MG: 10 INJECTION, SOLUTION INTRAVENOUS at 09:57

## 2019-12-03 RX ADMIN — ROCURONIUM BROMIDE 40 MG: 10 INJECTION, SOLUTION INTRAVENOUS at 08:04

## 2019-12-03 NOTE — PROGRESS NOTES
TRANSFER - IN REPORT:    Verbal report received from 50 Camryn Mcmahon RN(name) on [facility]  being received from OR(unit) for routine post - op      Report consisted of patients Situation, Background, Assessment and   Recommendations(SBAR). Information from the following report(s) SBAR, Kardex, OR Summary, Procedure Summary, Intake/Output, MAR, Recent Results, Med Rec Status and Cardiac Rhythm NSR was reviewed with the receiving nurse. Opportunity for questions and clarification was provided. Assessment completed upon patients arrival to unit and care assumed.

## 2019-12-03 NOTE — BRIEF OP NOTE
BRIEF OPERATIVE NOTE    Date of Procedure: 12/3/2019   Preoperative Diagnosis: Carotid stenosis, right [I65.21]  Postoperative Diagnosis: Carotid stenosis, right [I65.21]    Procedure(s):  RIGHT CAROTID ARTERY ENDARTERECTOMY  Surgeon(s) and Role:     * Shirley Valdivia MD - Primary         Surgical Assistant:     Surgical Staff:  Circ-1: Jerrica Howell RN  Circ-Relief: Anderson Peguero RN  Scrub Tech-1: Jose A Luz  Scrub Tech-2: Destinee Narvaez  Scrub RN-1: Melba Bernard RN  Event Time In Time Out   Incision Start  8:36 AM    Incision Close       Anesthesia: General   Estimated Blood Loss: 50cc  Specimens: * No specimens in log *   Findings:    Complications: None  Implants:   Implant Name Type Inv.  Item Serial No.  Lot No. LRB No. Used [facility] VASC PERIPATCH [id number] -- Chinmay Jeffries - [id number]  PATCH VASC PERIPATCH 0.8X8CM -- [employer] [id number] Right 1 Implanted

## 2019-12-03 NOTE — PROGRESS NOTES
Dual skin assessment performed. Sacrum without injury or redness. Heels are without injury. Right neck incision CDI no breakthrough drainage or swelling noted. ABAD drain in place, Charged.  Overall benign skin assessment

## 2019-12-03 NOTE — ANESTHESIA PROCEDURE NOTES
Arterial Line Placement    Start time: 12/3/2019 7:54 AM  End time: 12/3/2019 8:00 AM  Performed by: Mitesh Ornelas CRNA  Authorized by: Mitesh Ornelas CRNA     Pre-Procedure  Indications:  Arterial pressure monitoring and blood sampling  Preanesthetic Checklist: patient identified, risks and benefits discussed, anesthesia consent, site marked, patient being monitored, timeout performed and patient being monitored    Timeout Time: 07:54        Procedure:   Prep:  Chlorhexidine  Seldinger Technique?: No    Orientation:  Left  Location:  Radial artery  Catheter size:  20 G  Number of attempts:  1  Cont Cardiac Output Sensor: No      Assessment:   Post-procedure:  Line secured and sterile dressing applied  Patient Tolerance:  Patient tolerated the procedure well with no immediate complications  Comment:   Fingers cold bilateral baseline. Doppler Ulnar flow and palmar flow after art line placed.

## 2019-12-04 VITALS
HEIGHT: 68 IN | BODY MASS INDEX: 19.94 KG/M2 | SYSTOLIC BLOOD PRESSURE: 118 MMHG | HEART RATE: 73 BPM | OXYGEN SATURATION: 96 % | RESPIRATION RATE: 28 BRPM | TEMPERATURE: 97.2 F | WEIGHT: 131.56 LBS | DIASTOLIC BLOOD PRESSURE: 58 MMHG

## 2019-12-04 PROCEDURE — 74011250637 HC RX REV CODE- 250/637: Performed by: SURGERY

## 2019-12-04 PROCEDURE — 94640 AIRWAY INHALATION TREATMENT: CPT

## 2019-12-04 PROCEDURE — 74011250636 HC RX REV CODE- 250/636: Performed by: SURGERY

## 2019-12-04 PROCEDURE — 74011000250 HC RX REV CODE- 250: Performed by: SURGERY

## 2019-12-04 RX ORDER — TRAMADOL HYDROCHLORIDE 50 MG/1
50 TABLET ORAL
Qty: 20 TAB | Refills: 0 | Status: SHIPPED | OUTPATIENT
Start: 2019-12-04 | End: 2019-12-09

## 2019-12-04 RX ADMIN — ALBUTEROL SULFATE 2.5 MG: 2.5 SOLUTION RESPIRATORY (INHALATION) at 06:41

## 2019-12-04 RX ADMIN — ACETAMINOPHEN 650 MG: 325 TABLET, FILM COATED ORAL at 07:13

## 2019-12-04 RX ADMIN — ACETAMINOPHEN 650 MG: 325 TABLET, FILM COATED ORAL at 01:30

## 2019-12-04 RX ADMIN — ASPIRIN 81 MG: 81 TABLET ORAL at 08:06

## 2019-12-04 RX ADMIN — PANTOPRAZOLE SODIUM 40 MG: 40 TABLET, DELAYED RELEASE ORAL at 08:06

## 2019-12-04 RX ADMIN — Medication 10 ML: at 06:00

## 2019-12-04 RX ADMIN — Medication 2 G: at 01:35

## 2019-12-04 NOTE — ANESTHESIA POSTPROCEDURE EVALUATION
Procedure(s):  RIGHT CAROTID ARTERY ENDARTERECTOMY. general    Anesthesia Post Evaluation      Multimodal analgesia: multimodal analgesia used between 6 hours prior to anesthesia start to PACU discharge  Patient location during evaluation: ICU  Patient participation: complete - patient participated  Level of consciousness: awake  Pain management: adequate  Airway patency: patent  Anesthetic complications: no  Cardiovascular status: acceptable  Respiratory status: acceptable  Hydration status: acceptable  Post anesthesia nausea and vomiting:  none      Vitals Value Taken Time   /60 12/3/2019  7:30 PM   Temp     Pulse 57 12/3/2019  7:33 PM   Resp 18 12/3/2019  7:33 PM   SpO2 93 % 12/3/2019  7:33 PM   Vitals shown include unvalidated device data.

## 2019-12-04 NOTE — PROGRESS NOTES
Problem: Falls - Risk of  Goal: *Absence of Falls  Description  Document [facility] Fall Risk and appropriate interventions in the flowsheet. Outcome: Progressing Towards Goal  Note: Fall Risk Interventions:  Mobility Interventions: Assess mobility with egress test, Patient to call before getting OOB         Medication Interventions: Bed/chair exit alarm, Evaluate medications/consider consulting pharmacy, Patient to call before getting OOB, Teach patient to arise slowly    Elimination Interventions: Bed/chair exit alarm, Call light in reach, Elevated toilet seat, Patient to call for help with toileting needs, Stay With Me (per policy), Toilet paper/wipes in reach, Toileting schedule/hourly rounds              Problem: Pressure Injury - Risk of  Goal: *Prevention of pressure injury  Description  Document Bobby Scale and appropriate interventions in the flowsheet. Outcome: Progressing Towards Goal  Note: Pressure Injury Interventions:             Activity Interventions: Increase time out of bed, Pressure redistribution bed/mattress(bed type), PT/OT evaluation    Mobility Interventions: Float heels, HOB 30 degrees or less, Pressure redistribution bed/mattress (bed type)    Nutrition Interventions: Document food/fluid/supplement intake                     Problem: Carotid Endarterectomy Pathway: Day of Surgery ([facility])  Goal: Activity/Safety  Outcome: Progressing Towards Goal  Goal: Nutrition/Diet  Outcome: Progressing Towards Goal  Goal: Medications  Outcome: Progressing Towards Goal  Goal: Respiratory  Outcome: Progressing Towards Goal  Goal: Treatments/Interventions/Procedures  Outcome: Progressing Towards Goal  Goal: *No signs and symptoms of infection or wound complications  Outcome: Progressing Towards Goal  Goal: *Optimal pain control at patient's stated goal  Outcome: Progressing Towards Goal  Goal: *Adequate urinary output (equal to or greater than 30 milliliters/hour)  Outcome: Progressing Towards Goal  Goal: *Hemodynamically stable  Outcome: Progressing Towards Goal  Goal: *Tolerating diet  Outcome: Progressing Towards Goal  Goal: *Demonstrates progressive activity  Outcome: Progressing Towards Goal

## 2019-12-04 NOTE — DISCHARGE INSTRUCTIONS
MD Instructions:  Wound care:  - Remove surgical bandaging on Thursday, 12/5/2019.  - Wash neck wounds daily with liquid antibacterial soap (for example, Dial); use fingers or soft washcloth gently then pat area dry. - Keep incision clean and dry, may cover with gauze. - Do not apply lotions, creams or ointments to incisions. - Tissue adhesive (\"skin glue\") used over incision will flake or peel off on its own. Diet:  - As tolerated before surgery. Activity:  - Don't overdo your activity throughout the day for the next 10-14 days - no prolonged standing, no lifting more than 8 lb. - Keep legs propped up when not walking.  - Do not drive or drink alcohol while taking narcotics. - Starting Thursday, you may shower - no tub baths, soaking or swimming. Medications:  - Use prescription pain medications if needed; if you do not wish to use narcotic pain medication or require less pain control, you may take acetaminophen (Tylenol, for example) or naproxen (Aleve, for example) following instructions on the label.  - Resume other home medications. Follow up in the office on 12/26/2019. You will first have a post-operative ultrasound at 9:00 AM then you will see Dr. Janneth Sandy at 9:30 AM.    If problems or questions arise, please call our office at [phone]. DISCHARGE SUMMARY from Nurse    PATIENT INSTRUCTIONS:    After general anesthesia or intravenous sedation, for 24 hours or while taking prescription Narcotics:  · Limit your activities  · Do not drive and operate hazardous machinery  · Do not make important personal or business decisions  · Do  not drink alcoholic beverages  · If you have not urinated within 8 hours after discharge, please contact your surgeon on call.     Report the following to your surgeon:  · Excessive pain, swelling, redness or odor of or around the surgical area  · Temperature over 100.5  · Nausea and vomiting lasting longer than 4 hours or if unable to take medications  · Any signs of decreased circulation or nerve impairment to extremity: change in color, persistent  numbness, tingling, coldness or increase pain  · Any questions    What to do at Home:  Recommended activity: Activity as tolerated and No driving for 2 weeks      *  Please give a list of your current medications to your Primary Care Provider. *  Please update this list whenever your medications are discontinued, doses are      changed, or new medications (including over-the-counter products) are added. *  Please carry medication information at all times in case of emergency situations. These are general instructions for a healthy lifestyle:    No smoking/ No tobacco products/ Avoid exposure to second hand smoke  Surgeon General's Warning:  Quitting smoking now greatly reduces serious risk to your health. Obesity, smoking, and sedentary lifestyle greatly increases your risk for illness    A healthy diet, regular physical exercise & weight monitoring are important for maintaining a healthy lifestyle    You may be retaining fluid if you have a history of heart failure or if you experience any of the following symptoms:  Weight gain of 3 pounds or more overnight or 5 pounds in a week, increased swelling in our hands or feet or shortness of breath while lying flat in bed. Please call your doctor as soon as you notice any of these symptoms; do not wait until your next office visit. The discharge information has been reviewed with the patient. The patient verbalized understanding. Discharge medications reviewed with the patient and appropriate educational materials and side effects teaching were provided.   ___________________________________________________________________________________________________________________________________

## 2019-12-04 NOTE — OP NOTES
[facility]  OPERATIVE REPORT    Name:  Michael Betts  MR#:  [id number]  :  1950  ACCOUNT #:  [de-identified]  DATE OF SERVICE:  2019    PREOPERATIVE DIAGNOSIS:  Right carotid stenosis. POSTOPERATIVE DIAGNOSIS:  Right carotid stenosis. PROCEDURE PERFORMED:  Right carotid endarterectomy with bovine pericardial patch angioplasty. SURGEON:  Marietta Doll MD    ASSISTANT:  None. ANESTHESIA:  General endotracheal.    COMPLICATIONS:  None. SPECIMENS REMOVED:  None. IMPLANTS:  See chart. ESTIMATED BLOOD LOSS:  50 mL. DRAINS:  #15 ABAD. DESCRIPTION OF THE PROCEDURE:  The patient was brought to the operating room and placed on the operating table in supine position. Following adequate general endotracheal anesthesia and time-out procedure, the right neck was draped and prepped in sterile fashion. An oblique incision was made along the anterior border of the sternocleidomastoid muscle. The wound was deepened using the Bovie to control bleeding. The platysma was divided throughout the length of the wound. The dissection was carried down to the level of the internal jugular vein. The facial branch of the internal jugular vein was then divided between silk ties. Next, the common carotid, internal carotid and external carotid arteries were identified, mobilized and encircled with vessel loops. The patient was then systemically heparinized. Next, the internal and external common carotid arteries were occluded sequentially. A longitudinal arteriotomy was performed and the common carotid artery extended across the bifurcation onto the internal carotid artery beyond the area of high-grade stenosis. A 10-Upper [place] shunt was then placed in the internal carotid artery and then proximally in the common carotid artery. Flow was confirmed. The plaque was then mobilized circumferentially at the proximal end of the arteriotomy and then divided.   The plaque was then elevated and mobilized from proximal to distal.  The plaque extending into the external carotid artery was removed via eversion technique. The remainder of the plaque extending into the internal carotid artery was removed with downward traction and smooth tapered endpoint was achieved. Three 7-0 tacking sutures were placed. Next, the endarterectomy site was debrided of all movable debris. With this completed, a bovine pericardial patch was sewn in position with a running 6-0 Prolene suture brought out from either corner. Prior to completion of the patch closure, the shunt was removed and the native vessels were flushed and backbled. The closure was then complete and flow was restored to the external carotid artery and then to the internal carotid artery. Flow was confirmed by Doppler. Protamine was administered to reverse the heparin effect. A 15 ABAD drain was placed. The wound was then closed in layers with Vicryl suture. Sterile dry dressings were applied. The patient was awakened from anesthesia and was able to move all extremities on command. The patient was then transported to the ICU in stable condition. No complications. All needle and sponge counts were correct.       MD PRITI [id number]  D:  12/03/2019 10:37  T:  12/03/2019 20:47  JOB #:  [id number]

## 2019-12-04 NOTE — PROGRESS NOTES
Chart screened by  for discharge planning. No CM needs identified at time of discharge. Milestones met.      Care Management Interventions  Transition of Care Consult (CM Consult): Discharge Planning  Discharge Location  Discharge Placement: Home

## 2019-12-04 NOTE — PROGRESS NOTES
Bedside verbal report received from shruti Adler RN. Incision site benign. Assisted pt to [facility] and back to bed. No needs at present.

## 2019-12-04 NOTE — PROGRESS NOTES
Progress Note    Patient: Kenny See MRN: [id number]  SSN: [id number]    YOB: 1950  Age: 78 y.o. Sex: female      Admission Date: 12/3/2019    LOS: 1 day     1 Day Post-Op    PROCEDURE(S):  Right carotid endarterectomy with bovine pericardial patch angioplasty    Subjective:     Patient has appropriate incisional pain, mild pain with swallowing, productive cough. Objective:     Vitals:    12/04/19 0600 12/04/19 0641 12/04/19 0700 12/04/19 0800   BP: 127/60  123/59 128/63   Pulse: (!) 58  66 71   Resp: 15  13 14   Temp:   97.2 °F (36.2 °C)    SpO2: 97% 97% 96%    Weight:       Height:            Physical Exam:   GENERAL: alert, cooperative, no distress  HEENT: facial symmetry noted, EOM intact, tongue midline  LUNG: coarse breath sounds bilaterally, normal respiratory effort  HEART: regular rate and rhythm  ABDOMEN: soft, nontender, nondistended, bowel sounds normoactive  EXTREMITIES: warm, normal ROM and strength, full and symmetric     Lab/Data Review: All lab results for the last 24 hours reviewed. Assessment / Plan / Recommendations / Medical Decision Making:     Principal Problem:    Carotid stenosis, right (12/3/2019)    Active Problems:    Carotid stenosis (12/3/2019)        D/C home  Follow up in office in 1 mo      Signed By: Leroy Kraus PA-C    December 4, 2019      Physician Assistant with [facility]  [facility].  Abdifatah Cooper MD / Marietta Doll MD

## 2019-12-04 NOTE — PROGRESS NOTES
Discharge instructions reviewed with patient. Prescriptions given for Ultram and med info sheets provided for all new medications. Opportunity for questions provided. Patient voiced understanding of all discharge instructions.    [facility] completed score 0   Dr. Elva Delacruz at bedside - OK to discharge

## 2019-12-10 ENCOUNTER — PATIENT OUTREACH (OUTPATIENT)
Dept: CASE MANAGEMENT | Age: 69
End: 2019-12-10

## 2019-12-10 NOTE — PROGRESS NOTES
· Outreached to f/u with patient and review chart   · Patient had RIGHT CAROTID ARTERY ENDARTERECTOMY. on 12/3/19 and is being followed closely by vascular surgeon and cardiology   · Patient was instructed today to wait another 2 weeks before getting her hair dyed (to allow time for incision to completely heal)  · Denies any issued with med or transport  PLAN:  · Will f/u with patient in 2 weeks  · Patient has my contact information   This note will not be viewable in [address]

## 2019-12-17 ENCOUNTER — TELEPHONE (OUTPATIENT)
Dept: CARDIAC REHAB | Age: 69
End: 2019-12-17

## 2019-12-20 ENCOUNTER — HOSPITAL ENCOUNTER (OUTPATIENT)
Dept: GENERAL RADIOLOGY | Age: 69
Discharge: HOME | End: 2019-12-20
Payer: MEDICARE

## 2019-12-20 DIAGNOSIS — R07.9 CHEST PAIN, UNSPECIFIED TYPE: ICD-10-CM

## 2019-12-20 PROBLEM — J43.2 CENTRILOBULAR EMPHYSEMA (HCC): Status: ACTIVE | Noted: 2019-12-20

## 2019-12-20 PROCEDURE — 71046 X-RAY EXAM CHEST 2 VIEWS: CPT

## 2020-01-22 NOTE — H&P (VIEW-ONLY)
[address] [place], [address] [facility] 
[phone] Patient Name:  Carmine Carrillo YOB: 1950 Office Visit 1/22/2020 CHIEF COMPLAINT:   
Chief Complaint Patient presents with  COPD HISTORY OF PRESENT ILLNESS:   
The patient is a 50-year-old white female who is seen for follow-up of very severe COPD with centrilobular emphysema. She recently had community-acquired pneumonia of left upper lobe and was treated with Levaquin and steroids. States her chest wall pain, cough, and wheezing have resolved. SPAULDING is still not quite back to baseline. She remains on O2 at 1 lpm with sleep. She has appointment at [facility] on 2/18/20 for lung transplant evaluation. Past Medical History:  
Diagnosis Date  CAD (coronary artery disease) 2009, 8/19/2013 PCI,  [facility]  Chronic anxiety 4/27/2017  COPD Dr. Ryan Garcia  GERD (gastroesophageal reflux disease)  Hypertension  Nausea & vomiting  Thyroid disease   
 pt denies Problem List  Date Reviewed: 12/26/2019 Codes Class Noted Chest pain ICD-10-CM: R07.9 ICD-9-CM: 786.50  5/31/2019 Centrilobular emphysema ([facility] [address] ICD-10-CM: J43.2 ICD-9-CM: 492.8  12/20/2019 Carotid stenosis ICD-10-CM: I65.29 ICD-9-CM: 433.10  12/3/2019 Carotid stenosis, right ICD-10-CM: L53.67 ICD-9-CM: 433.10  12/3/2019 Hypertensive urgency ICD-10-CM: I16.0 ICD-9-CM: 401.9  6/8/2019 Chronic respiratory failure with hypoxia [facility]) ICD-10-CM: J96.11 
ICD-9-CM: 518.83, 799.02  6/8/2019 CAD S/P percutaneous coronary angioplasty ICD-10-CM: I25.10, Z98.61 ICD-9-CM: 414.01, V45.82  5/31/2019 Ventricular ectopy ICD-10-CM: I49.3 ICD-9-CM: 427.69  5/9/2017 Chronic anxiety ICD-10-CM: F41.9 ICD-9-CM: 300.00  4/27/2017 Hyperlipidemia ICD-10-CM: E78.5 ICD-9-CM: 272.4  10/10/2016  Essential hypertension, benign ICD-10-CM: I10 
 ICD-9-CM: 401.1  10/10/2016 Coronary artery disease involving native coronary artery of native heart without angina pectoris ICD-10-CM: I25.10 ICD-9-CM: 414.01  10/10/2016 Hypoxemia ICD-10-CM: R09.02 
ICD-9-CM: 799.02  8/25/2013 Overview Signed 2/5/2014  9:08 AM by Jhonny Herrera NP  
  Sofia Reason 8/2013. Pt had over 2 hours 1/2 hours of desaturations at night. O2 was ordered for her to start at 2L in September, but she stated she was feeling better than when FILIBERTO was done and refused O2. FILIBERTO 12/2013: not performed secondary to patient factors. COPD, very severe ([address] ICD-10-CM: J44.9 ICD-9-CM: 560  8/20/2013 CAD (coronary artery disease) (Chronic) ICD-10-CM: I25.10 ICD-9-CM: 414.00  8/19/2013 Personal history of tobacco use ICD-10-CM: Z87.891 ICD-9-CM: V15.82  8/19/2013 History of MI (myocardial infarction) ICD-10-CM: I25.2 ICD-9-CM: 900  8/19/2013 Overview Addendum 8/30/2018  9:20 AM by Rylie Espinosa MD  
  STEMI 8/19/13:  Angioplasty for in-stent stenosis to LAD Past Surgical History:  
Procedure Laterality Date  CARDIAC SURG PROCEDURE UNLIST  HX GYN    
 rebuilt cervix  HX TONSIL AND ADENOIDECTOMY No flowsheet data found. Social History Socioeconomic History  Marital status:  Spouse name: Not on file  Number of children: Not on file  Years of education: Not on file  Highest education level: Not on file Occupational History  Occupation: [employer] Employer: SELF EMPLOYED Comment: home Social Needs  Financial resource strain: Not on file  Food insecurity:  
  Worry: Not on file Inability: Not on file  Transportation needs:  
  Medical: Not on file Non-medical: Not on file Tobacco Use  Smoking status: Former Smoker Packs/day: 0.75 Years: 53.00 Pack years: 39.75 Types: Cigarettes Last attempt to quit: 2018 Years since quittin.0  Smokeless tobacco: Never Used Substance and Sexual Activity  Alcohol use: Yes Alcohol/week: 3.0 standard drinks Types: 1 Glasses of wine, 1 Cans of beer, 1 Shots of liquor per week Comment: 3 drinks a week  Drug use: No  
 Sexual activity: Not on file Lifestyle  Physical activity:  
  Days per week: Not on file Minutes per session: Not on file  Stress: Not on file Relationships  Social connections:  
  Talks on phone: Not on file Gets together: Not on file Attends Druze service: Not on file Active member of club or organization: Not on file Attends meetings of clubs or organizations: Not on file Relationship status: Not on file  Intimate partner violence:  
  Fear of current or ex partner: Not on file Emotionally abused: Not on file Physically abused: Not on file Forced sexual activity: Not on file Other Topics Concern [address] Service Not Asked  Blood Transfusions Not Asked  Caffeine Concern Not Asked  Occupational Exposure Not Asked [place] Hazards Not Asked  Sleep Concern Not Asked  Stress Concern Not Asked  Weight Concern Not Asked  Special Diet Not Asked  Back Care Not Asked  Exercise Not Asked  Bike Helmet Not Asked  Seat Belt Not Asked  Self-Exams Not Asked Social History Narrative , has 2 children, 1 step child. 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   
 
 
 
Family History Problem Relation Age of Onset  Heart Attack Father Adoptive father  No Known Problems Mother   
     adopted Allergies Allergen Reactions  Phenergan [Promethazine] Nausea and Vomiting Severe vomiting Current Outpatient Medications Medication Sig  
 montelukast (SINGULAIR) 10 mg tablet Take 10 mg by mouth daily.  SPIRIVA WITH HANDIHALER 18 mcg inhalation capsule INHALE THE CONTENTS OF 1 CAPSULE VIA INHALATION DEVICE DAILY  albuterol (PROVENTIL HFA, VENTOLIN HFA, PROAIR HFA) 90 mcg/actuation inhaler Take 2 Puffs by inhalation every four (4) hours as needed for Wheezing or Shortness of Breath.  torsemide (DEMADEX) 10 mg tablet Take 1 Tab by mouth daily.  budesonide-formoterol (SYMBICORT) 160-4.5 mcg/actuation HFAA Take 2 Puffs by inhalation two (2) times a day.  roflumilast (DALIRESP) 250 mcg tab Take 250 mcg by mouth every other day.  ramipril (ALTACE) 5 mg capsule Take 1 Cap by mouth daily.  pantoprazole (PROTONIX) 40 mg tablet TAKE 1 TABLET BY MOUTH TWICE DAILY (Patient taking differently: 40 mg.)  clopidogrel (PLAVIX) 75 mg tab TAKE 1 TABLET BY MOUTH DAILY (Patient taking differently: Take 75 mg by mouth daily.)  atorvastatin (LIPITOR) 80 mg tablet TAKE 1 TABLET BY MOUTH DAILY (Patient taking differently: Take 80 mg by mouth daily.)  metoprolol succinate (TOPROL-XL) 100 mg tablet Take 1 Tab by mouth daily.  OXYGEN-AIR DELIVERY SYSTEMS by Does Not Apply route. 1lpm qhs  
 albuterol (PROVENTIL VENTOLIN) 2.5 mg /3 mL (0.083 %) nebulizer solution 3 mL by Nebulization route every six (6) hours as needed for Shortness of Breath. Indications: chronic obstructive lung disease  nitroglycerin (NITROSTAT) 0.4 mg SL tablet 1 Tab by SubLINGual route every five (5) minutes as needed for Chest Pain.  cetirizine (ZYRTEC) 10 mg tablet Take  by mouth daily as needed.  multivitamin (ONE A DAY) tablet Take 1 Tab by mouth daily.  guaiFENesin ER (MUCINEX) 600 mg ER tablet Take 600 mg by mouth two (2) times daily as needed.  aspirin delayed-release 81 mg tablet Take  by mouth daily. No current facility-administered medications for this visit. REVIEW OF SYSTEMS: 
Review of Systems Constitutional: Negative for chills, fever, malaise/fatigue and weight loss. Respiratory: Positive for shortness of breath. Cardiovascular: Negative for chest pain, palpitations and leg swelling. Gastrointestinal: Negative for abdominal pain, constipation, diarrhea, heartburn, nausea and vomiting. Neurological: Negative for dizziness, tremors, seizures, weakness and headaches. PHYSICAL EXAM: 
 
Vitals:  
 01/22/20 0825 BP: 118/57 BP 1 Location: Left arm BP Patient Position: Sitting Pulse: 62 Resp: 18 Temp: 97.5 °F (36.4 °C) TempSrc: Temporal  
SpO2: 96% Comment: room air Weight: 136 lb 6.4 oz (61.9 kg) Height: 5' 7.99\" (1.727 m) Body mass index is 20.74 kg/m². GENERAL APPEARANCE: 
 The patient is normal weight and in no respiratory distress. HEENT: 
 PERRL. Conjunctivae unremarkable. Nasal mucosa is without epistaxis, exudate, or polyps. Gums and dentition are unremarkable. There is no oropharyngeal narrowing. TMs are clear. NECK/LYMPHATIC: 
 Symmetrical with no elevation of jugular venous pulsation. Trachea midline. No thyroid enlargement. No cervical adenopathy. LUNGS: 
 Normal respiratory effort with symmetrical lung expansion. Breath sounds decreased bilaterally, but clear. HEART: 
 There is a regular rate and rhythm. No murmur, rub, or gallop. There is no edema in the lower extremities. ABDOMEN: 
 Soft and non-tender. No hepatosplenomegaly. Bowel sounds are normal.   
 SKIN: 
 There are no rashes, cyanosis, jaundice, or ecchymosis present. EXTREMITIES: 
 The extremities are unremarkable without clubbing, cyanosis, joint inflammation, degenerative, or ischemic change. MUSCULOSKELETAL: 
 There is no abnormal tone, muscle atrophy, or abnormal movement present. NEURO: 
 The patient is alert and oriented to person, place, and time. Memory appears intact and mood is normal.  No gross sensorimotor deficits are present. DIAGNOSTIC TESTS: 
 PCXR:  
Results for orders placed during the hospital encounter of 04/26/17 XR CHEST PORT Narrative CHEST X-RAY, single portable view  4/26/2017 History: Moderate chest pain and jaw pain since last night. Technique: Single frontal view of the chest. 
 
Comparison: Chest x-ray 12/27/2013 Findings: The cardiac silhouette is normal in respect to size. The lungs are expanded 
without evidence for pneumothorax. No consolidative airspace process or 
pleural effusion is seen. Impression IMPRESSION:  
1. No acute cardiopulmonary process evident on single frontal view of the 
chest. 
 
 
  
 
 
 CXR PA and lateral:   
Results for orders placed during the hospital encounter of 12/20/19 XR CHEST PA LAT Narrative PA LATERAL CHEST  12/20/2019 8:44 AM  
 
HISTORY:  sob, chest wall pain COMPARISON: June 6, 2019 FINDINGS:  The heart size is within normal limits. Consolidation is present in 
the left upper lobe. .  The lungs are hyperinflated with a flattened diaphragm. Coronary artery stents are present. Impression IMPRESSION:  
 
1. COPD. 2. Left upper lobe consolidation. CT of chest with contrast:   
Results for orders placed during the hospital encounter of 06/06/19 CT CHEST W CONT Narrative CT OF THE CHEST WITH INTRAVENOUS CONTRAST, 6/7/2019 Indication: Chest pain, and shortness of breath. Comparison: None Technique:   2.5 mm axial scans from above the aortic arch to the lung bases 
following the uneventful administration of 70 mL of Isovue-370. Intravenous 
contrast was given to evaluate for pulmonary embolism. All CT scans performed at 
this facility use one or all of the following: Automated exposure control, 
adjustment of the mA and/or kVp according to patient's size, iterative 
reconstruction. Findings: The base of the neck is unremarkable in appearance. No axillary, mediastinal, 
or hilar lymphadenopathy is seen. The thoracic aorta is normal in caliber. Opacification of the pulmonary arteries is good. No abnormal filling defects 
are seen to suggest pulmonary embolism. Evaluation with lung windows demonstrates advanced emphysematous changes of the 
lungs. No definite focal infiltrate is seen. A trace dependent left pleural 
effusion is seen. No pleural effusion is seen. Lungs are expanded without 
evidence for pneumothorax. No acute osseous abnormality is seen. Limited evaluation of the upper abdomen demonstrates no acute abnormality. Impression IMPRESSION:   
1. No evidence for pulmonary embolism. 2. Trace dependent left pleural effusion which can suggest early or mild fluid 
overload. 3. Advanced emphysema. Spirometry:  01/22/2020 ASSESSMENT:  (Medical Decision Making) ICD-10-CM ICD-9-CM 1. COPD, very severe ([address] GOLD stage 4--symptomatically stable J44.9 496 AMB POC SPIROMETRY  
   RT--OVERNIGHT OXIMETRY  
   CT CHEST WO CONT 2. Community acquired pneumonia of left upper lobe of lung ([address] Symptomatically improving J18.1 481 CT CHEST WO CONT  
   CANCELED: XR CHEST PA LAT 3. Nocturnal hypoxemia Unchanged G47.34 327.24 RT--OVERNIGHT OXIMETRY 4. Pre-transplant evaluation for lung transplant Z01.818 V72.83 CT CHEST WO CONT PLAN: 
Her [employer] company has not performed FILIBERTO yet as requested in November. We will re-submit the order. Discussed need for CXR in 2 weeks for follow up of pneumonia versus obtaining CT of chest for lung transplant evaluation to take with her to [facility]. She wishes to proceed with CT of chest and this will be arranged. The current medical regimen is effective;  continue present plan and medications. Orders Placed This Encounter  AMB POC SPIROMETRY  CT CHEST WO CONT  RT--OVERNIGHT OXIMETRY Follow up with us in 4 months. Collaborating physician is Dr. Usha Anderson. NT Over 50% of today's office visit was spent in face to face time reviewing test results/records, prognosis, importance of compliance, education about disease process, benefits of medications, instructions for management of acute flare-ups, and follow up plans. Total time spent was 27 minutes. Jamal Pike, NP Electronically signed Dictated using voice recognition software.   Proof read but unrecognized errors may exist.

## 2020-01-29 ENCOUNTER — HOSPITAL ENCOUNTER (OUTPATIENT)
Dept: CT IMAGING | Age: 70
Discharge: HOME | End: 2020-01-29
Attending: NURSE PRACTITIONER
Payer: MEDICARE

## 2020-01-29 DIAGNOSIS — Z01.818 PRE-TRANSPLANT EVALUATION FOR LUNG TRANSPLANT: ICD-10-CM

## 2020-01-29 DIAGNOSIS — J44.9 COPD, VERY SEVERE (HCC): ICD-10-CM

## 2020-01-29 DIAGNOSIS — J18.9 COMMUNITY ACQUIRED PNEUMONIA OF LEFT UPPER LOBE OF LUNG: ICD-10-CM

## 2020-01-29 PROCEDURE — 71250 CT THORAX DX C-: CPT

## 2020-01-30 NOTE — PROGRESS NOTES
Reviewed with Dr. Kavita Boone. Recommends PET scan and navigational bronchoscopy. Spoke with patient regarding CT results and plan. She agrees. Please arrange.

## 2020-02-09 ENCOUNTER — ANESTHESIA EVENT (OUTPATIENT)
Dept: ENDOSCOPY | Age: 70
End: 2020-02-09
Payer: MEDICARE

## 2020-02-10 RX ORDER — LIDOCAINE HYDROCHLORIDE 40 MG/ML
SOLUTION TOPICAL AS NEEDED
Status: CANCELLED | OUTPATIENT
Start: 2020-02-10

## 2020-02-10 RX ORDER — LIDOCAINE HYDROCHLORIDE 20 MG/ML
JELLY TOPICAL AS NEEDED
Status: CANCELLED | OUTPATIENT
Start: 2020-02-10

## 2020-02-10 NOTE — PROGRESS NOTES
Pt contacted regarding procedure time of 1000 with an arrival time of 0830. Pt verbalized understanding.

## 2020-02-11 ENCOUNTER — APPOINTMENT (OUTPATIENT)
Dept: GENERAL RADIOLOGY | Age: 70
End: 2020-02-11
Attending: INTERNAL MEDICINE
Payer: MEDICARE

## 2020-02-11 ENCOUNTER — PATIENT OUTREACH (OUTPATIENT)
Dept: CASE MANAGEMENT | Age: 70
End: 2020-02-11

## 2020-02-11 ENCOUNTER — HOSPITAL ENCOUNTER (OUTPATIENT)
Age: 70
Setting detail: OUTPATIENT SURGERY
Discharge: HOME | End: 2020-02-11
Attending: INTERNAL MEDICINE | Admitting: INTERNAL MEDICINE
Payer: MEDICARE

## 2020-02-11 ENCOUNTER — ANESTHESIA (OUTPATIENT)
Dept: ENDOSCOPY | Age: 70
End: 2020-02-11
Payer: MEDICARE

## 2020-02-11 VITALS
SYSTOLIC BLOOD PRESSURE: 124 MMHG | TEMPERATURE: 97.9 F | DIASTOLIC BLOOD PRESSURE: 64 MMHG | HEART RATE: 70 BPM | OXYGEN SATURATION: 99 % | RESPIRATION RATE: 15 BRPM

## 2020-02-11 DIAGNOSIS — R91.1 LUNG NODULE: ICD-10-CM

## 2020-02-11 PROCEDURE — 88305 TISSUE EXAM BY PATHOLOGIST: CPT

## 2020-02-11 PROCEDURE — 88112 CYTOPATH CELL ENHANCE TECH: CPT

## 2020-02-11 PROCEDURE — 31628 BRONCHOSCOPY/LUNG BX EACH: CPT | Performed by: INTERNAL MEDICINE

## 2020-02-11 PROCEDURE — 77030031404 HC PTCH SENS SD DISP SPDM -A: Performed by: INTERNAL MEDICINE

## 2020-02-11 PROCEDURE — 77030039425 HC BLD LARYNG TRULITE DISP TELE -A: Performed by: ANESTHESIOLOGY

## 2020-02-11 PROCEDURE — 77030028571 HC CATH ENDOBRNCH DISP BIOP KT SPMD -G1: Performed by: INTERNAL MEDICINE

## 2020-02-11 PROCEDURE — 94640 AIRWAY INHALATION TREATMENT: CPT

## 2020-02-11 PROCEDURE — 76040000026: Performed by: INTERNAL MEDICINE

## 2020-02-11 PROCEDURE — 74011000250 HC RX REV CODE- 250

## 2020-02-11 PROCEDURE — 31629 BRONCHOSCOPY/NEEDLE BX EACH: CPT | Performed by: INTERNAL MEDICINE

## 2020-02-11 PROCEDURE — 77030021922 HC FCPS BIOP SD DISP SPDM -D: Performed by: INTERNAL MEDICINE

## 2020-02-11 PROCEDURE — 31623 DX BRONCHOSCOPE/BRUSH: CPT | Performed by: INTERNAL MEDICINE

## 2020-02-11 PROCEDURE — 31654 BRONCH EBUS IVNTJ PERPH LES: CPT | Performed by: INTERNAL MEDICINE

## 2020-02-11 PROCEDURE — 76060000033 HC ANESTHESIA 1 TO 1.5 HR: Performed by: INTERNAL MEDICINE

## 2020-02-11 PROCEDURE — 77030037088 HC TUBE ENDOTRACH ORAL NSL COVD-A: Performed by: ANESTHESIOLOGY

## 2020-02-11 PROCEDURE — 77030031420 HC NDL TIP BRSH ASP SD SPDM -B: Performed by: INTERNAL MEDICINE

## 2020-02-11 PROCEDURE — 77030012699 HC VLV SUC CNTRL OCOA -A: Performed by: INTERNAL MEDICINE

## 2020-02-11 PROCEDURE — 74011250636 HC RX REV CODE- 250/636: Performed by: ANESTHESIOLOGY

## 2020-02-11 PROCEDURE — 74011250637 HC RX REV CODE- 250/637: Performed by: NURSE ANESTHETIST, CERTIFIED REGISTERED

## 2020-02-11 PROCEDURE — 31627 NAVIGATIONAL BRONCHOSCOPY: CPT | Performed by: INTERNAL MEDICINE

## 2020-02-11 PROCEDURE — 74011000250 HC RX REV CODE- 250: Performed by: NURSE ANESTHETIST, CERTIFIED REGISTERED

## 2020-02-11 PROCEDURE — 74011250636 HC RX REV CODE- 250/636: Performed by: NURSE ANESTHETIST, CERTIFIED REGISTERED

## 2020-02-11 RX ORDER — FLUMAZENIL 0.1 MG/ML
0.2 INJECTION INTRAVENOUS
Status: DISCONTINUED | OUTPATIENT
Start: 2020-02-11 | End: 2020-02-11 | Stop reason: HOSPADM

## 2020-02-11 RX ORDER — SODIUM CHLORIDE, SODIUM LACTATE, POTASSIUM CHLORIDE, CALCIUM CHLORIDE 600; 310; 30; 20 MG/100ML; MG/100ML; MG/100ML; MG/100ML
75 INJECTION, SOLUTION INTRAVENOUS CONTINUOUS
Status: DISCONTINUED | OUTPATIENT
Start: 2020-02-11 | End: 2020-02-11 | Stop reason: HOSPADM

## 2020-02-11 RX ORDER — SODIUM CHLORIDE, SODIUM LACTATE, POTASSIUM CHLORIDE, CALCIUM CHLORIDE 600; 310; 30; 20 MG/100ML; MG/100ML; MG/100ML; MG/100ML
100 INJECTION, SOLUTION INTRAVENOUS CONTINUOUS
Status: DISCONTINUED | OUTPATIENT
Start: 2020-02-11 | End: 2020-02-11 | Stop reason: HOSPADM

## 2020-02-11 RX ORDER — FENTANYL CITRATE 50 UG/ML
INJECTION, SOLUTION INTRAMUSCULAR; INTRAVENOUS AS NEEDED
Status: DISCONTINUED | OUTPATIENT
Start: 2020-02-11 | End: 2020-02-11 | Stop reason: HOSPADM

## 2020-02-11 RX ORDER — IPRATROPIUM BROMIDE AND ALBUTEROL SULFATE 2.5; .5 MG/3ML; MG/3ML
3 SOLUTION RESPIRATORY (INHALATION)
Status: COMPLETED | OUTPATIENT
Start: 2020-02-11 | End: 2020-02-11

## 2020-02-11 RX ORDER — NEOSTIGMINE METHYLSULFATE 1 MG/ML
INJECTION, SOLUTION INTRAVENOUS AS NEEDED
Status: DISCONTINUED | OUTPATIENT
Start: 2020-02-11 | End: 2020-02-11 | Stop reason: HOSPADM

## 2020-02-11 RX ORDER — ALBUTEROL SULFATE 90 UG/1
AEROSOL, METERED RESPIRATORY (INHALATION) AS NEEDED
Status: DISCONTINUED | OUTPATIENT
Start: 2020-02-11 | End: 2020-02-11 | Stop reason: HOSPADM

## 2020-02-11 RX ORDER — LIDOCAINE HYDROCHLORIDE 20 MG/ML
INJECTION, SOLUTION EPIDURAL; INFILTRATION; INTRACAUDAL; PERINEURAL AS NEEDED
Status: DISCONTINUED | OUTPATIENT
Start: 2020-02-11 | End: 2020-02-11 | Stop reason: HOSPADM

## 2020-02-11 RX ORDER — HYDROMORPHONE HYDROCHLORIDE 1 MG/ML
0.2 INJECTION, SOLUTION INTRAMUSCULAR; INTRAVENOUS; SUBCUTANEOUS
Status: DISCONTINUED | OUTPATIENT
Start: 2020-02-11 | End: 2020-02-11 | Stop reason: HOSPADM

## 2020-02-11 RX ORDER — LIDOCAINE HYDROCHLORIDE 10 MG/ML
0.1 INJECTION INFILTRATION; PERINEURAL AS NEEDED
Status: DISCONTINUED | OUTPATIENT
Start: 2020-02-11 | End: 2020-02-11 | Stop reason: HOSPADM

## 2020-02-11 RX ORDER — OXYCODONE HYDROCHLORIDE 5 MG/1
5 TABLET ORAL
Status: DISCONTINUED | OUTPATIENT
Start: 2020-02-11 | End: 2020-02-11 | Stop reason: HOSPADM

## 2020-02-11 RX ORDER — GLYCOPYRROLATE 0.2 MG/ML
INJECTION INTRAMUSCULAR; INTRAVENOUS AS NEEDED
Status: DISCONTINUED | OUTPATIENT
Start: 2020-02-11 | End: 2020-02-11 | Stop reason: HOSPADM

## 2020-02-11 RX ORDER — ONDANSETRON 2 MG/ML
INJECTION INTRAMUSCULAR; INTRAVENOUS AS NEEDED
Status: DISCONTINUED | OUTPATIENT
Start: 2020-02-11 | End: 2020-02-11 | Stop reason: HOSPADM

## 2020-02-11 RX ORDER — DEXAMETHASONE SODIUM PHOSPHATE 4 MG/ML
INJECTION, SOLUTION INTRA-ARTICULAR; INTRALESIONAL; INTRAMUSCULAR; INTRAVENOUS; SOFT TISSUE AS NEEDED
Status: DISCONTINUED | OUTPATIENT
Start: 2020-02-11 | End: 2020-02-11 | Stop reason: HOSPADM

## 2020-02-11 RX ORDER — ROCURONIUM BROMIDE 10 MG/ML
INJECTION, SOLUTION INTRAVENOUS AS NEEDED
Status: DISCONTINUED | OUTPATIENT
Start: 2020-02-11 | End: 2020-02-11 | Stop reason: HOSPADM

## 2020-02-11 RX ORDER — EPHEDRINE SULFATE 50 MG/ML
INJECTION, SOLUTION INTRAVENOUS AS NEEDED
Status: DISCONTINUED | OUTPATIENT
Start: 2020-02-11 | End: 2020-02-11 | Stop reason: HOSPADM

## 2020-02-11 RX ORDER — IPRATROPIUM BROMIDE AND ALBUTEROL SULFATE 2.5; .5 MG/3ML; MG/3ML
SOLUTION RESPIRATORY (INHALATION)
Status: COMPLETED
Start: 2020-02-11 | End: 2020-02-11

## 2020-02-11 RX ORDER — DIPHENHYDRAMINE HYDROCHLORIDE 50 MG/ML
12.5 INJECTION, SOLUTION INTRAMUSCULAR; INTRAVENOUS
Status: DISCONTINUED | OUTPATIENT
Start: 2020-02-11 | End: 2020-02-11 | Stop reason: HOSPADM

## 2020-02-11 RX ORDER — PROPOFOL 10 MG/ML
INJECTION, EMULSION INTRAVENOUS AS NEEDED
Status: DISCONTINUED | OUTPATIENT
Start: 2020-02-11 | End: 2020-02-11 | Stop reason: HOSPADM

## 2020-02-11 RX ORDER — NALOXONE HYDROCHLORIDE 0.4 MG/ML
0.1 INJECTION, SOLUTION INTRAMUSCULAR; INTRAVENOUS; SUBCUTANEOUS AS NEEDED
Status: DISCONTINUED | OUTPATIENT
Start: 2020-02-11 | End: 2020-02-11 | Stop reason: HOSPADM

## 2020-02-11 RX ADMIN — LIDOCAINE HYDROCHLORIDE 100 MG: 20 INJECTION, SOLUTION EPIDURAL; INFILTRATION; INTRACAUDAL; PERINEURAL at 10:31

## 2020-02-11 RX ADMIN — PHENYLEPHRINE HYDROCHLORIDE 50 MCG: 10 INJECTION INTRAVENOUS at 10:51

## 2020-02-11 RX ADMIN — Medication 3 MG: at 11:12

## 2020-02-11 RX ADMIN — IPRATROPIUM BROMIDE AND ALBUTEROL SULFATE 3 ML: .5; 3 SOLUTION RESPIRATORY (INHALATION) at 13:10

## 2020-02-11 RX ADMIN — EPHEDRINE SULFATE 10 MG: 50 INJECTION, SOLUTION INTRAVENOUS at 11:04

## 2020-02-11 RX ADMIN — ROCURONIUM BROMIDE 20 MG: 10 INJECTION, SOLUTION INTRAVENOUS at 10:32

## 2020-02-11 RX ADMIN — GLYCOPYRROLATE 0.4 MG: 0.2 INJECTION, SOLUTION INTRAMUSCULAR; INTRAVENOUS at 11:12

## 2020-02-11 RX ADMIN — SODIUM CHLORIDE, SODIUM LACTATE, POTASSIUM CHLORIDE, AND CALCIUM CHLORIDE: 600; 310; 30; 20 INJECTION, SOLUTION INTRAVENOUS at 10:21

## 2020-02-11 RX ADMIN — SODIUM CHLORIDE, SODIUM LACTATE, POTASSIUM CHLORIDE, AND CALCIUM CHLORIDE: 600; 310; 30; 20 INJECTION, SOLUTION INTRAVENOUS at 11:14

## 2020-02-11 RX ADMIN — ALBUTEROL SULFATE 6 PUFF: 90 AEROSOL, METERED RESPIRATORY (INHALATION) at 10:38

## 2020-02-11 RX ADMIN — PROPOFOL 160 MG: 10 INJECTION, EMULSION INTRAVENOUS at 10:31

## 2020-02-11 RX ADMIN — EPHEDRINE SULFATE 5 MG: 50 INJECTION, SOLUTION INTRAVENOUS at 10:53

## 2020-02-11 RX ADMIN — DEXAMETHASONE SODIUM PHOSPHATE 10 MG: 4 INJECTION, SOLUTION INTRAMUSCULAR; INTRAVENOUS at 10:38

## 2020-02-11 RX ADMIN — SODIUM CHLORIDE, SODIUM LACTATE, POTASSIUM CHLORIDE, AND CALCIUM CHLORIDE 75 ML/HR: 600; 310; 30; 20 INJECTION, SOLUTION INTRAVENOUS at 08:57

## 2020-02-11 RX ADMIN — PHENYLEPHRINE HYDROCHLORIDE 100 MCG: 10 INJECTION INTRAVENOUS at 11:04

## 2020-02-11 RX ADMIN — FENTANYL CITRATE 50 MCG: 50 INJECTION INTRAMUSCULAR; INTRAVENOUS at 11:19

## 2020-02-11 RX ADMIN — FENTANYL CITRATE 50 MCG: 50 INJECTION INTRAMUSCULAR; INTRAVENOUS at 10:31

## 2020-02-11 RX ADMIN — IPRATROPIUM BROMIDE AND ALBUTEROL SULFATE 3 ML: 2.5; .5 SOLUTION RESPIRATORY (INHALATION) at 13:10

## 2020-02-11 RX ADMIN — PHENYLEPHRINE HYDROCHLORIDE 50 MCG: 10 INJECTION INTRAVENOUS at 10:53

## 2020-02-11 RX ADMIN — EPHEDRINE SULFATE 5 MG: 50 INJECTION, SOLUTION INTRAVENOUS at 10:51

## 2020-02-11 RX ADMIN — ONDANSETRON 4 MG: 2 INJECTION INTRAMUSCULAR; INTRAVENOUS at 10:38

## 2020-02-11 NOTE — PROCEDURES
PROCEDURE  Bronchoscopy with endobronchial ultrasound guided fine needle aspiration of hilar/mediastinal lymph nodes and airway inspection and EMN aided transbronchial lung biopsy of peripheral lung nodule. EQUIPEMENT  Olympus T180 bronchoscope  Super Dimension EMN system  90 deg stearable sheath  Olympus [id number] EBUS scope  UM 17/20 Radial probe  Super D forceps, needle brush    INDICATION   Peripheral nodule suspicious for malignancy /staging of presumed malignancy    IMAGING  CT Chest 1/29/20        POST OP DIAGNOSIS:  Super Dimension EMN system was employed to identify and biopsy the CANDELARIO nodule seen on CT.  8 transbronchial lung biopsies, 1 brushings, 1 needle biopsies, and 1 BAL were performed with touch preparations revealing no malignant cells on CONNER. Histology from obtained biopsies is pending. Without diagnosis of cancer and no targets seen on CT no mediastinal EBUS was performed. Based on imaging and biopsies, pt is suspected of having a STAGE [id number] (IA2) non-small cell lung cancer. ANESTHESIA  General anesthesia and intubation provided by anesthesiology      AIRWAY INSPECTION  After obtaining informed consent, using a bite block, an Olympus Q 180 video bronchoscope was introduced into the trachea through the vocal cords without complication. RIGHT  LOCATION NORM/ABNORMAL DESCRIPTION   VOCAL CORDS ETT    TRACHEA NL    ANNA NL    RMSB NL    RUL NL    BI NL    RML NL    RLL NL    SUP SEGM RLL NL    MED BASAL NL    ANTERIOR BASAL NL    LATERAL BASAL NL    POSTERIOR BASAL NL            LEFT  LOCATION NORMAL/ABNORMAL TYPE   LMSB NL    CANDELARIO NL    LINGULA NL    LLL NL    SUPERIOR SEG LLL NL    KARMEN-MEDIAL LLL NL    LATERAL LLL NL    POSTERIOR LLL NL             Navigation    Olympus T 180 bronchoscope was introduced into the airways to identify and biopsy the CANDELARIO nodule with the aid of Super D EMN system and radial probe US.     CT images acquired with Super D protocol were used to plan the pathway to target lesion planned using Super D software. Planning data was then used to navigate to the nodule, with verification of location using radial US. Visibility with radial US was: moderate - abnormality seen to the side of the radial probe despite excellent location with Super D.   8 transbronchial lung biopsies were obtained and evaluated via touch prep and CONNER. Touch preps revealed no malignant cells. Histology from obtained tissue is currently pending. TOUCH PREP BIOPSY# MALIGNANT ATYPIA/SUSPICIOUS DIAGNOSIS   CANDELARIO 1 - - -    2 - - -    3 - - -    4 - - -    5 - - -    6 - - -    7 - - -    8 - - -       ADDITIONAL BIOPSIES  Needle aspiration from CANDELARIO nodule - cytology  Brushing from CANDELARIO nodule - cytology  Mini BAL from CANDELARIO nodule - cytology        EBL: <07YR    Complications: None    Diagnosis: CANDELARIO nodule suspicious for IA2 lung cancer, not yet proven. Plan: Will continue with plan to obtain PET scan to further evaluate this area. Will then present at tumor board to discuss next steps in case today's biopsies are non diagnostic. Had discussed placement of fiducial markers at this location with her prior to procedure but had agreed only to place them if CONNER showed malignant cells which it did not. No fiducial markers were placed. If final path does show malignancy may need to place under separate procedure given her severely reduced lung function.        Lola Bertrand MD

## 2020-02-11 NOTE — PROGRESS NOTES
Patient currently direct admit to hospital for endoscopy/bronchoscopy for pulmonary nodule   PLAN:  Will outreach to patient for JAVAN/CCM when discharged from hospital to home  This note will not be viewable in [address]

## 2020-02-11 NOTE — PROGRESS NOTES
Pt c/o short of breath when up to w/c for discharge, placed on pulse ox, RA SATS 86-91%. Lungs with exp wheeze noted. Dr Heena Eid notified and received order for duoneb treatment.   FIO2 placed @ 2L/NC and SATS to greater than 94%+

## 2020-02-11 NOTE — INTERVAL H&P NOTE
H&P Update:  Leon Dupree was seen and examined. History and physical has been reviewed. The patient has been examined.  There have been no significant clinical changes since the completion of the originally dated History and Physical.

## 2020-02-11 NOTE — DISCHARGE INSTRUCTIONS
RESPIRATORY CARE - BRONCHOSCOPY - DISCHARGE INSTRUCTIONS      You received a lot of numbing medication for your throat and nose, and you also received medication to make you sleepy during your procedure. Because of this and because the bronchoscopy may have irritated your airways, we ask that you follow these directions:    1. Do not eat or drink until  12:30 . After that, you may have what you please. You may want to try some liquids first, because your throat may be a little sore. 2. Medication may cause drowsiness for several hours, therefore:  · Do not drive or operate machinery for remainder of the day. · No alcohol today  · Do not make any important or legal decisions for 24 hours  · Do not sign any legal documents for 24 hours    3. You may cough up more mucus than usual and you may see some blood, but this is expected and should subside by the following day. 4. If severe throat irritation, coughing, or bleeding continue, call your doctor. 5.         If you run a fever greater than 102, call [facility] at [phone]. 6.         Dr. Javid Campo has asked that you:                A. Call the doctor's office at [phone] for any questions or concerns related to your procedure today. Demetrice Gutierrez follow up appointment with [facility] Pulmonary and the PET SCAN. The office will call you with final results from today. Discharge Medications:  Restart PLAVIX tomorrow, Resume all other medications and take caution in any medications that cause drowsiness.           Instructions given to [facility] and other family members    After general anesthesia or intravenous sedation, for 24 hours or while taking prescription Narcotics:  · Limit your activities  · A responsible adult needs to be with you for the next 24 hours  · Do not drive and operate hazardous machinery  · Do not make important personal or business decisions  · Do  not drink alcoholic beverages  · If you have not urinated within 8 hours after discharge, please contact your surgeon on call. · If you have sleep apnea and have a CPAP machine, please use it for all naps and sleeping. · Please use caution when taking narcotics and any of your home medications that may cause drowsiness. *  Please give a list of your current medications to your Primary Care Provider. *  Please update this list whenever your medications are discontinued, doses are      changed, or new medications (including over-the-counter products) are added. *  Please carry medication information at all times in case of emergency situations. These are general instructions for a healthy lifestyle:  No smoking/ No tobacco products/ Avoid exposure to second hand smoke  Surgeon General's Warning:  Quitting smoking now greatly reduces serious risk to your health. Obesity, smoking, and sedentary lifestyle greatly increases your risk for illness  A healthy diet, regular physical exercise & weight monitoring are important for maintaining a healthy lifestyle    You may be retaining fluid if you have a history of heart failure or if you experience any of the following symptoms:  Weight gain of 3 pounds or more overnight or 5 pounds in a week, increased swelling in our hands or feet or shortness of breath while lying flat in bed. Please call your doctor as soon as you notice any of these symptoms; do not wait until your next office visit.

## 2020-02-11 NOTE — PERIOP NOTES
Notified Dr Garcias Conception pt states feeling much better after neb treatment. Pt denies shortness of breath, RA SATS 99%, lungs clear. Received ok to d/c pt home.

## 2020-02-11 NOTE — ANESTHESIA PREPROCEDURE EVALUATION
Relevant Problems   No relevant active problems       Anesthetic History     PONV          Review of Systems / Medical History  Patient summary reviewed and pertinent labs reviewed    Pulmonary    COPD: moderate            Comments: Wears O2 at night     Lung nodule for bronch    Neuro/Psych         Psychiatric history (anxiety )    Comments: Carotid stenosis s/p CEA Cardiovascular    Hypertension          Past MI, CAD, PAD (carotid stenosis s/p CEA) and cardiac stents    Exercise tolerance: >4 METS  Comments: Reported normal LV function with cath 5/31/19, new RCA stent then, prior >10 stents, occluded LCx, open LAD and RCA with multiple stents    Echo 6/2019 - EF 55-60%   GI/Hepatic/Renal  Within defined limits              Endo/Other        Arthritis     Other Findings              Physical Exam    Airway  Mallampati: II  TM Distance: 4 - 6 cm  Neck ROM: normal range of motion   Mouth opening: Normal    Comments: Short chin    Prior Grade 2 view with DL Cardiovascular    Rhythm: regular  Rate: normal         Dental    Dentition: Caps/crowns and Bridges     Pulmonary  Breath sounds clear to auscultation               Abdominal  GI exam deferred       Other Findings            Anesthetic Plan    ASA: 3  Anesthesia type: general  8.5 ETT        Induction: Intravenous  Anesthetic plan and risks discussed with: Patient and Spouse

## 2020-02-11 NOTE — ANESTHESIA POSTPROCEDURE EVALUATION
Procedure(s):  BRONCHOSCOPY WITH RADIAL PROBE  BRONCHOSCOPY  BRONCHOSCOPY NAVIGATIONAL W C-ARM  TRANSBRONCHIAL BIOPSY  ENDOSCOPIC BRUSHING  FINE NEEDLE ASPIRATION. general    Anesthesia Post Evaluation        Patient location during evaluation: PACU  Patient participation: complete - patient participated  Level of consciousness: awake and alert  Pain management: adequate  Airway patency: patent  Anesthetic complications: no  Cardiovascular status: acceptable  Respiratory status: acceptable  Hydration status: acceptable  Post anesthesia nausea and vomiting:  controlled      Vitals Value Taken Time   /61 2/11/2020 12:01 PM   Temp 36.6 °C (97.9 °F) 2/11/2020 12:01 PM   Pulse 73 2/11/2020 12:02 PM   Resp 15 2/11/2020 12:01 PM   SpO2 99 % 2/11/2020 12:02 PM   Vitals shown include unvalidated device data.

## 2020-02-13 ENCOUNTER — PATIENT OUTREACH (OUTPATIENT)
Dept: CASE MANAGEMENT | Age: 70
End: 2020-02-13

## 2020-02-13 NOTE — PROGRESS NOTES
· Outreached to f/u with patient -s/p lung nodule biopsy on 2/11/20   · Patient had RIGHT CAROTID ARTERY ENDARTERECTOMY. on 12/3/19 and is being followed closely by vascular surgeon and cardiology   · Patient states that the biopsy was negative for malignancy  · She is waiting on further test results before proceeding with a referral to [facility]   · States that she is doing good at this time   · Independent with all ADL's and still drives and works  · Denies any issued with meds  PLAN:  · Will f/u with patient next week  · Patient has my contact information  This note will not be viewable in [address]

## 2020-02-20 ENCOUNTER — HOSPITAL ENCOUNTER (OUTPATIENT)
Dept: PET IMAGING | Age: 70
Discharge: HOME | End: 2020-02-20
Payer: MEDICARE

## 2020-02-20 ENCOUNTER — PATIENT OUTREACH (OUTPATIENT)
Dept: CASE MANAGEMENT | Age: 70
End: 2020-02-20

## 2020-02-20 VITALS — BODY MASS INDEX: 20.16 KG/M2 | HEIGHT: 68 IN | WEIGHT: 133 LBS

## 2020-02-20 DIAGNOSIS — R91.1 LUNG NODULE: ICD-10-CM

## 2020-02-20 PROCEDURE — 74011636320 HC RX REV CODE- 636/320: Performed by: NURSE PRACTITIONER

## 2020-02-20 PROCEDURE — A9552 F18 FDG: HCPCS

## 2020-02-20 RX ORDER — SODIUM CHLORIDE 0.9 % (FLUSH) 0.9 %
10 SYRINGE (ML) INJECTION
Status: COMPLETED | OUTPATIENT
Start: 2020-02-20 | End: 2020-02-20

## 2020-02-20 RX ADMIN — Medication 10 ML: at 10:50

## 2020-02-20 RX ADMIN — DIATRIZOATE MEGLUMINE AND DIATRIZOATE SODIUM 10 ML: 660; 100 LIQUID ORAL; RECTAL at 10:50

## 2020-02-20 NOTE — PROGRESS NOTES
Attempted outreach to f/u with patient - UTR at this time  Per [facility] care patient may possibly be at PET/CT scan  PLAN:  Will attempt outreach again tomorrow   This note will not be viewable in [address]

## 2020-02-20 NOTE — PROGRESS NOTES
Dr. Jr Gracia, is there anything to biopsy on the CANDELARIO? Also, the breast density appears to have been present since 2017. Alisa, OK to call patient with results once Dr. Jr Gracia reviews and makes recommendations. Thank you.

## 2020-02-21 ENCOUNTER — PATIENT OUTREACH (OUTPATIENT)
Dept: CASE MANAGEMENT | Age: 70
End: 2020-02-21

## 2020-02-21 NOTE — PROGRESS NOTES
· Outreached to f/u with patient -s/p lung nodule biopsy on 2/11/20   · Patient had RIGHT CAROTID ARTERY ENDARTERECTOMY. on 12/3/19 and is being followed closely by vascular surgeon and cardiology   · Patient states that the biopsy was negative for malignancy  · Patient is waiting for results from PET/CT scan on 2/20/20 before proceeding with a referral to [facility]   · States that she is doing good at this time   · Independent with all ADL's and still drives and works  · Denies any issued with meds  PLAN:  · Will f/u with patient next week  · Patient has my contact information  This note will not be viewable in [address]

## 2020-02-24 NOTE — PROGRESS NOTES
Patient has already had biopsy which was negative. She also has had evaluation at [facility]--recommendations in [facility]. I spoke with patient about results and recommendations--will further address at appt on 3/11/2020 (40 minute appt).

## 2020-03-11 ENCOUNTER — HOSPITAL ENCOUNTER (OUTPATIENT)
Dept: LAB | Age: 70
Discharge: HOME | End: 2020-03-11
Payer: MEDICARE

## 2020-03-11 LAB
ARTERIAL PATENCY WRIST A: YES
BASE DEFICIT BLD-SCNC: 1 MMOL/L
BDY SITE: ABNORMAL
CO2 BLD-SCNC: 23 MMOL/L
COLLECT TIME,HTIME: 915
GAS FLOW.O2 O2 DELIVERY SYS: ABNORMAL L/MIN
HCO3 BLD-SCNC: 22 MMOL/L (ref 22–26)
PCO2 BLD: 31.1 MMHG (ref 35–45)
PH BLD: 7.46 [PH] (ref 7.35–7.45)
PO2 BLD: 87 MMHG (ref 75–100)
SAO2 % BLD: 97 % (ref 95–98)
SERVICE CMNT-IMP: ABNORMAL
SERVICE CMNT-IMP: ABNORMAL
SPECIMEN TYPE: ABNORMAL

## 2020-03-11 PROCEDURE — 82803 BLOOD GASES ANY COMBINATION: CPT

## 2020-03-11 PROCEDURE — 36600 WITHDRAWAL OF ARTERIAL BLOOD: CPT

## 2020-03-12 NOTE — PROGRESS NOTES
Patient was notified of the results. She was told blood gas is normal. She verbalized understanding.

## 2020-03-18 ENCOUNTER — HOSPITAL ENCOUNTER (OUTPATIENT)
Dept: ULTRASOUND IMAGING | Age: 70
Discharge: HOME | End: 2020-03-18
Attending: NURSE PRACTITIONER
Payer: MEDICARE

## 2020-03-18 DIAGNOSIS — R09.89 BRUIT OF LEFT CAROTID ARTERY: ICD-10-CM

## 2020-03-18 PROCEDURE — 93880 EXTRACRANIAL BILAT STUDY: CPT

## 2020-03-18 PROCEDURE — 93882 EXTRACRANIAL UNI/LTD STUDY: CPT

## 2020-06-03 ENCOUNTER — HOSPITAL ENCOUNTER (OUTPATIENT)
Dept: NON INVASIVE DIAGNOSTICS | Age: 70
Discharge: HOME | End: 2020-06-03
Attending: NURSE PRACTITIONER
Payer: MEDICARE

## 2020-06-03 DIAGNOSIS — R06.09 DYSPNEA ON EXERTION: ICD-10-CM

## 2020-06-03 PROCEDURE — 93306 TTE W/DOPPLER COMPLETE: CPT

## 2020-06-04 NOTE — PROGRESS NOTES
Patient was notified that Echo has improved. She was encouraged to continue torsemide. She verbalized understanding.

## 2020-06-25 ENCOUNTER — HOSPITAL ENCOUNTER (OUTPATIENT)
Dept: CT IMAGING | Age: 70
Discharge: HOME | End: 2020-06-25
Attending: INTERNAL MEDICINE

## 2020-06-25 DIAGNOSIS — R91.1 PULMONARY NODULE: ICD-10-CM

## 2020-07-08 ENCOUNTER — PATIENT OUTREACH (OUTPATIENT)
Dept: CASE MANAGEMENT | Age: 70
End: 2020-07-08

## 2020-07-08 NOTE — PROGRESS NOTES
CCM case closed at this time due to patient is being case managed by [facility] and [facility]  This note will not be viewable in [address]

## 2020-09-21 PROBLEM — H92.01 RIGHT EAR PAIN: Status: ACTIVE | Noted: 2020-09-21

## 2020-09-21 PROBLEM — Z98.890 H/O CAROTID ENDARTERECTOMY: Status: ACTIVE | Noted: 2020-09-21

## 2020-09-25 ENCOUNTER — HOSPITAL ENCOUNTER (OUTPATIENT)
Dept: CT IMAGING | Age: 70
Discharge: HOME | End: 2020-09-25
Attending: INTERNAL MEDICINE
Payer: MEDICARE

## 2020-09-25 DIAGNOSIS — R91.1 PULMONARY NODULE: ICD-10-CM

## 2020-09-25 PROCEDURE — 71250 CT THORAX DX C-: CPT

## 2020-10-23 ENCOUNTER — TRANSCRIBE ORDER (OUTPATIENT)
Dept: SCHEDULING | Age: 70
End: 2020-10-23

## 2020-10-23 DIAGNOSIS — Z12.31 VISIT FOR SCREENING MAMMOGRAM: Primary | ICD-10-CM

## 2020-10-26 ENCOUNTER — HOSPITAL ENCOUNTER (OUTPATIENT)
Dept: MAMMOGRAPHY | Age: 70
Discharge: HOME | End: 2020-10-26
Attending: FAMILY MEDICINE
Payer: MEDICARE

## 2020-10-26 DIAGNOSIS — Z12.31 VISIT FOR SCREENING MAMMOGRAM: ICD-10-CM

## 2020-10-26 PROCEDURE — 77067 SCR MAMMO BI INCL CAD: CPT

## 2020-11-06 ENCOUNTER — HOSPITAL ENCOUNTER (OUTPATIENT)
Dept: MAMMOGRAPHY | Age: 70
Discharge: HOME | End: 2020-11-06
Attending: FAMILY MEDICINE
Payer: MEDICARE

## 2020-11-06 DIAGNOSIS — R92.8 ABNORMAL SCREENING MAMMOGRAM: ICD-10-CM

## 2020-11-06 PROCEDURE — 76642 ULTRASOUND BREAST LIMITED: CPT

## 2020-11-06 PROCEDURE — 77065 DX MAMMO INCL CAD UNI: CPT

## 2021-01-04 ENCOUNTER — HOSPITAL ENCOUNTER (OUTPATIENT)
Dept: CARDIAC REHAB | Age: 71
Discharge: HOME | End: 2021-01-04

## 2021-01-05 NOTE — CARDIO/PULMONARY
January 4, 2021 Dear Breanne Cantu: Thank you for referring your patient, Alan Alcaraz to the Pulmonary Rehabilitation Program at [facility]. Ms. Sri Urias is a good candidate for the program and should see improvements with regular participation. We will be working to increase your patient's endurance and self care over the next 12 weeks. We will contact you with any issues or concerns that may arise, or you can follow your patient's progress through [address] [place] [address] at any time. We will send you a final summary report when the program is completed. Again, thank you for your referral. If we can be of further assistance, please feel free to contact the Cardiopulmonary Rehab staff at [phone]. Sincerely, Alfredo Araiza, RRT, RCP Pulmonary Rehab Specialist  
[facility] CC: File

## 2021-01-12 ENCOUNTER — HOSPITAL ENCOUNTER (OUTPATIENT)
Dept: CARDIAC REHAB | Age: 71
End: 2021-01-12

## 2021-01-14 ENCOUNTER — APPOINTMENT (OUTPATIENT)
Dept: CARDIAC REHAB | Age: 71
End: 2021-01-14

## 2021-01-19 ENCOUNTER — APPOINTMENT (OUTPATIENT)
Dept: CARDIAC REHAB | Age: 71
End: 2021-01-19

## 2021-01-21 ENCOUNTER — APPOINTMENT (OUTPATIENT)
Dept: CARDIAC REHAB | Age: 71
End: 2021-01-21

## 2021-01-26 ENCOUNTER — APPOINTMENT (OUTPATIENT)
Dept: CARDIAC REHAB | Age: 71
End: 2021-01-26

## 2021-01-28 ENCOUNTER — APPOINTMENT (OUTPATIENT)
Dept: CARDIAC REHAB | Age: 71
End: 2021-01-28

## 2021-02-02 ENCOUNTER — APPOINTMENT (OUTPATIENT)
Dept: CARDIAC REHAB | Age: 71
End: 2021-02-02
Payer: MEDICARE

## 2021-02-04 ENCOUNTER — APPOINTMENT (OUTPATIENT)
Dept: CARDIAC REHAB | Age: 71
End: 2021-02-04
Payer: MEDICARE

## 2021-02-09 ENCOUNTER — APPOINTMENT (OUTPATIENT)
Dept: CARDIAC REHAB | Age: 71
End: 2021-02-09
Payer: MEDICARE

## 2021-02-09 ENCOUNTER — HOSPITAL ENCOUNTER (OUTPATIENT)
Dept: CARDIAC REHAB | Age: 71
Discharge: HOME | End: 2021-02-09
Payer: MEDICARE

## 2021-02-09 VITALS — HEIGHT: 68 IN | WEIGHT: 151 LBS | BODY MASS INDEX: 22.88 KG/M2

## 2021-02-09 PROCEDURE — G0239 OTH RESP PROC, GROUP: HCPCS

## 2021-02-11 ENCOUNTER — APPOINTMENT (OUTPATIENT)
Dept: CARDIAC REHAB | Age: 71
End: 2021-02-11
Payer: MEDICARE

## 2021-02-11 ENCOUNTER — HOSPITAL ENCOUNTER (OUTPATIENT)
Dept: CARDIAC REHAB | Age: 71
Discharge: HOME | End: 2021-02-11
Payer: MEDICARE

## 2021-02-11 PROCEDURE — G0239 OTH RESP PROC, GROUP: HCPCS

## 2021-02-16 ENCOUNTER — APPOINTMENT (OUTPATIENT)
Dept: CARDIAC REHAB | Age: 71
End: 2021-02-16
Payer: MEDICARE

## 2021-02-16 ENCOUNTER — HOSPITAL ENCOUNTER (OUTPATIENT)
Dept: CARDIAC REHAB | Age: 71
Discharge: HOME | End: 2021-02-16
Payer: MEDICARE

## 2021-02-16 VITALS — BODY MASS INDEX: 22.87 KG/M2 | WEIGHT: 150.4 LBS

## 2021-02-16 PROCEDURE — G0239 OTH RESP PROC, GROUP: HCPCS

## 2021-02-18 ENCOUNTER — APPOINTMENT (OUTPATIENT)
Dept: CARDIAC REHAB | Age: 71
End: 2021-02-18
Payer: MEDICARE

## 2021-02-23 ENCOUNTER — HOSPITAL ENCOUNTER (OUTPATIENT)
Dept: CARDIAC REHAB | Age: 71
Discharge: HOME | End: 2021-02-23
Payer: MEDICARE

## 2021-02-23 ENCOUNTER — APPOINTMENT (OUTPATIENT)
Dept: CARDIAC REHAB | Age: 71
End: 2021-02-23
Payer: MEDICARE

## 2021-02-23 VITALS — BODY MASS INDEX: 22.69 KG/M2 | WEIGHT: 149.2 LBS

## 2021-02-23 PROCEDURE — G0239 OTH RESP PROC, GROUP: HCPCS

## 2021-02-24 PROBLEM — I25.9 ISCHEMIC HEART DISEASE: Status: ACTIVE | Noted: 2021-02-24

## 2021-02-25 ENCOUNTER — APPOINTMENT (OUTPATIENT)
Dept: CARDIAC REHAB | Age: 71
End: 2021-02-25
Payer: MEDICARE

## 2021-02-25 ENCOUNTER — HOSPITAL ENCOUNTER (OUTPATIENT)
Dept: CARDIAC REHAB | Age: 71
Discharge: HOME | End: 2021-02-25
Payer: MEDICARE

## 2021-02-25 PROCEDURE — G0239 OTH RESP PROC, GROUP: HCPCS

## 2021-03-01 ENCOUNTER — HOSPITAL ENCOUNTER (OUTPATIENT)
Dept: CT IMAGING | Age: 71
Discharge: HOME | End: 2021-03-01
Attending: INTERNAL MEDICINE

## 2021-03-01 DIAGNOSIS — R91.1 LUNG NODULE: ICD-10-CM

## 2021-03-02 ENCOUNTER — APPOINTMENT (OUTPATIENT)
Dept: CARDIAC REHAB | Age: 71
End: 2021-03-02
Payer: MEDICARE

## 2021-03-04 ENCOUNTER — APPOINTMENT (OUTPATIENT)
Dept: CARDIAC REHAB | Age: 71
End: 2021-03-04
Payer: MEDICARE

## 2021-03-04 ENCOUNTER — HOSPITAL ENCOUNTER (OUTPATIENT)
Dept: CARDIAC REHAB | Age: 71
Discharge: HOME | End: 2021-03-04
Payer: MEDICARE

## 2021-03-04 VITALS — WEIGHT: 151.4 LBS | BODY MASS INDEX: 23.02 KG/M2

## 2021-03-04 PROCEDURE — G0239 OTH RESP PROC, GROUP: HCPCS

## 2021-03-09 ENCOUNTER — APPOINTMENT (OUTPATIENT)
Dept: CARDIAC REHAB | Age: 71
End: 2021-03-09
Payer: MEDICARE

## 2021-03-09 ENCOUNTER — HOSPITAL ENCOUNTER (OUTPATIENT)
Dept: CARDIAC REHAB | Age: 71
Discharge: HOME | End: 2021-03-09
Payer: MEDICARE

## 2021-03-09 VITALS — BODY MASS INDEX: 22.84 KG/M2 | WEIGHT: 150.2 LBS

## 2021-03-09 PROCEDURE — G0239 OTH RESP PROC, GROUP: HCPCS

## 2021-03-11 ENCOUNTER — APPOINTMENT (OUTPATIENT)
Dept: CARDIAC REHAB | Age: 71
End: 2021-03-11
Payer: MEDICARE

## 2021-03-11 ENCOUNTER — HOSPITAL ENCOUNTER (OUTPATIENT)
Dept: CARDIAC REHAB | Age: 71
Discharge: HOME | End: 2021-03-11
Payer: MEDICARE

## 2021-03-11 PROCEDURE — G0239 OTH RESP PROC, GROUP: HCPCS

## 2021-03-16 ENCOUNTER — HOSPITAL ENCOUNTER (OUTPATIENT)
Dept: CARDIAC REHAB | Age: 71
Discharge: HOME | End: 2021-03-16
Payer: MEDICARE

## 2021-03-16 ENCOUNTER — APPOINTMENT (OUTPATIENT)
Dept: CARDIAC REHAB | Age: 71
End: 2021-03-16
Payer: MEDICARE

## 2021-03-16 VITALS — BODY MASS INDEX: 22.53 KG/M2 | WEIGHT: 148.2 LBS

## 2021-03-16 PROCEDURE — G0239 OTH RESP PROC, GROUP: HCPCS

## 2021-03-18 ENCOUNTER — HOSPITAL ENCOUNTER (OUTPATIENT)
Dept: CARDIAC REHAB | Age: 71
Discharge: HOME | End: 2021-03-18
Payer: MEDICARE

## 2021-03-18 ENCOUNTER — APPOINTMENT (OUTPATIENT)
Dept: CARDIAC REHAB | Age: 71
End: 2021-03-18
Payer: MEDICARE

## 2021-03-18 PROCEDURE — G0239 OTH RESP PROC, GROUP: HCPCS

## 2021-03-23 ENCOUNTER — HOSPITAL ENCOUNTER (OUTPATIENT)
Dept: CARDIAC REHAB | Age: 71
Discharge: HOME | End: 2021-03-23
Payer: MEDICARE

## 2021-03-23 ENCOUNTER — APPOINTMENT (OUTPATIENT)
Dept: CARDIAC REHAB | Age: 71
End: 2021-03-23
Payer: MEDICARE

## 2021-03-23 VITALS — WEIGHT: 150 LBS | BODY MASS INDEX: 22.81 KG/M2

## 2021-03-23 PROCEDURE — G0239 OTH RESP PROC, GROUP: HCPCS

## 2021-03-25 ENCOUNTER — HOSPITAL ENCOUNTER (OUTPATIENT)
Dept: CARDIAC REHAB | Age: 71
Discharge: HOME | End: 2021-03-25
Payer: MEDICARE

## 2021-03-25 ENCOUNTER — APPOINTMENT (OUTPATIENT)
Dept: CARDIAC REHAB | Age: 71
End: 2021-03-25
Payer: MEDICARE

## 2021-03-25 PROCEDURE — G0239 OTH RESP PROC, GROUP: HCPCS

## 2021-03-30 ENCOUNTER — APPOINTMENT (OUTPATIENT)
Dept: CARDIAC REHAB | Age: 71
End: 2021-03-30
Payer: MEDICARE

## 2021-04-01 ENCOUNTER — APPOINTMENT (OUTPATIENT)
Dept: CARDIAC REHAB | Age: 71
End: 2021-04-01
Payer: MEDICARE

## 2021-04-02 ENCOUNTER — HOSPITAL ENCOUNTER (OUTPATIENT)
Dept: GENERAL RADIOLOGY | Age: 71
Discharge: HOME | End: 2021-04-02
Payer: MEDICARE

## 2021-04-02 DIAGNOSIS — R06.02 SOB (SHORTNESS OF BREATH): ICD-10-CM

## 2021-04-02 PROCEDURE — 71046 X-RAY EXAM CHEST 2 VIEWS: CPT

## 2021-04-06 ENCOUNTER — APPOINTMENT (OUTPATIENT)
Dept: CARDIAC REHAB | Age: 71
End: 2021-04-06
Payer: MEDICARE

## 2021-04-08 ENCOUNTER — APPOINTMENT (OUTPATIENT)
Dept: CARDIAC REHAB | Age: 71
End: 2021-04-08
Payer: MEDICARE

## 2021-04-13 ENCOUNTER — HOSPITAL ENCOUNTER (OUTPATIENT)
Dept: CARDIAC REHAB | Age: 71
Discharge: HOME | End: 2021-04-13
Payer: MEDICARE

## 2021-04-13 VITALS — BODY MASS INDEX: 22.87 KG/M2 | WEIGHT: 150.4 LBS

## 2021-04-13 PROCEDURE — G0239 OTH RESP PROC, GROUP: HCPCS

## 2021-04-15 ENCOUNTER — HOSPITAL ENCOUNTER (OUTPATIENT)
Dept: CARDIAC REHAB | Age: 71
Discharge: HOME | End: 2021-04-15
Payer: MEDICARE

## 2021-04-15 PROCEDURE — G0239 OTH RESP PROC, GROUP: HCPCS

## 2021-04-20 ENCOUNTER — HOSPITAL ENCOUNTER (OUTPATIENT)
Dept: CARDIAC REHAB | Age: 71
Discharge: HOME | End: 2021-04-20
Payer: MEDICARE

## 2021-04-20 VITALS — BODY MASS INDEX: 23.17 KG/M2 | WEIGHT: 152.4 LBS

## 2021-04-20 PROCEDURE — G0239 OTH RESP PROC, GROUP: HCPCS

## 2021-04-22 ENCOUNTER — HOSPITAL ENCOUNTER (OUTPATIENT)
Dept: CARDIAC REHAB | Age: 71
Discharge: HOME | End: 2021-04-22
Payer: MEDICARE

## 2021-04-22 PROCEDURE — G0239 OTH RESP PROC, GROUP: HCPCS

## 2021-04-27 ENCOUNTER — HOSPITAL ENCOUNTER (OUTPATIENT)
Dept: CARDIAC REHAB | Age: 71
Discharge: HOME | End: 2021-04-27
Payer: MEDICARE

## 2021-04-27 VITALS — BODY MASS INDEX: 22.69 KG/M2 | WEIGHT: 149.2 LBS

## 2021-04-27 PROCEDURE — G0239 OTH RESP PROC, GROUP: HCPCS

## 2021-04-29 ENCOUNTER — HOSPITAL ENCOUNTER (OUTPATIENT)
Dept: CARDIAC REHAB | Age: 71
Discharge: HOME | End: 2021-04-29
Payer: MEDICARE

## 2021-04-29 PROCEDURE — G0239 OTH RESP PROC, GROUP: HCPCS

## 2021-05-04 ENCOUNTER — HOSPITAL ENCOUNTER (OUTPATIENT)
Dept: CARDIAC REHAB | Age: 71
Discharge: HOME | End: 2021-05-04
Payer: MEDICARE

## 2021-05-04 VITALS — BODY MASS INDEX: 22.56 KG/M2 | WEIGHT: 148.4 LBS

## 2021-05-04 PROCEDURE — G0239 OTH RESP PROC, GROUP: HCPCS

## 2021-05-11 ENCOUNTER — HOSPITAL ENCOUNTER (OUTPATIENT)
Dept: CARDIAC REHAB | Age: 71
Discharge: HOME | End: 2021-05-11
Payer: MEDICARE

## 2021-05-11 PROCEDURE — G0239 OTH RESP PROC, GROUP: HCPCS

## 2021-05-13 ENCOUNTER — HOSPITAL ENCOUNTER (OUTPATIENT)
Dept: CARDIAC REHAB | Age: 71
Discharge: HOME | End: 2021-05-13
Payer: MEDICARE

## 2021-05-13 VITALS — BODY MASS INDEX: 23.14 KG/M2 | WEIGHT: 152.2 LBS

## 2021-05-13 PROCEDURE — G0239 OTH RESP PROC, GROUP: HCPCS

## 2021-05-18 ENCOUNTER — HOSPITAL ENCOUNTER (OUTPATIENT)
Dept: CARDIAC REHAB | Age: 71
Discharge: HOME | End: 2021-05-18
Payer: MEDICARE

## 2021-05-18 VITALS — WEIGHT: 149.2 LBS | BODY MASS INDEX: 22.69 KG/M2

## 2021-05-18 PROCEDURE — G0239 OTH RESP PROC, GROUP: HCPCS

## 2021-05-25 ENCOUNTER — APPOINTMENT (OUTPATIENT)
Dept: CARDIAC REHAB | Age: 71
End: 2021-05-25
Payer: MEDICARE

## 2021-05-27 ENCOUNTER — HOSPITAL ENCOUNTER (OUTPATIENT)
Dept: CARDIAC REHAB | Age: 71
Discharge: HOME | End: 2021-05-27
Payer: MEDICARE

## 2021-05-27 VITALS — BODY MASS INDEX: 22.9 KG/M2 | WEIGHT: 150.6 LBS

## 2021-05-27 PROCEDURE — G0239 OTH RESP PROC, GROUP: HCPCS

## 2021-06-01 ENCOUNTER — HOSPITAL ENCOUNTER (OUTPATIENT)
Dept: NON INVASIVE DIAGNOSTICS | Age: 71
Discharge: HOME | End: 2021-06-01
Attending: INTERNAL MEDICINE
Payer: MEDICARE

## 2021-06-01 DIAGNOSIS — R06.09 DOE (DYSPNEA ON EXERTION): ICD-10-CM

## 2021-06-01 PROCEDURE — 93306 TTE W/DOPPLER COMPLETE: CPT

## 2021-06-03 ENCOUNTER — HOSPITAL ENCOUNTER (OUTPATIENT)
Dept: CARDIAC REHAB | Age: 71
Discharge: HOME | End: 2021-06-03

## 2021-07-06 ENCOUNTER — HOSPITAL ENCOUNTER (OUTPATIENT)
Dept: GENERAL RADIOLOGY | Age: 71
Discharge: HOME | End: 2021-07-06
Payer: MEDICARE

## 2021-07-06 DIAGNOSIS — R06.02 SOB (SHORTNESS OF BREATH): ICD-10-CM

## 2021-07-06 DIAGNOSIS — R07.9 CHEST PAIN, UNSPECIFIED TYPE: ICD-10-CM

## 2021-07-06 PROCEDURE — 71046 X-RAY EXAM CHEST 2 VIEWS: CPT

## 2021-07-14 PROBLEM — Z51.5 PALLIATIVE CARE PATIENT: Status: ACTIVE | Noted: 2021-07-14

## 2021-09-03 ENCOUNTER — APPOINTMENT (OUTPATIENT)
Dept: GENERAL RADIOLOGY | Age: 71
End: 2021-09-03
Attending: EMERGENCY MEDICINE
Payer: MEDICARE

## 2021-09-03 ENCOUNTER — HOSPITAL ENCOUNTER (EMERGENCY)
Age: 71
Discharge: HOME | End: 2021-09-04
Attending: EMERGENCY MEDICINE
Payer: MEDICARE

## 2021-09-03 ENCOUNTER — APPOINTMENT (OUTPATIENT)
Dept: CT IMAGING | Age: 71
End: 2021-09-03
Attending: EMERGENCY MEDICINE
Payer: MEDICARE

## 2021-09-03 DIAGNOSIS — J44.1 ACUTE EXACERBATION OF CHRONIC OBSTRUCTIVE PULMONARY DISEASE (COPD) (HCC): Primary | ICD-10-CM

## 2021-09-03 LAB
ALBUMIN SERPL-MCNC: 3.6 G/DL (ref 3.2–4.6)
ALBUMIN/GLOB SERPL: 0.7 {RATIO} (ref 1.2–3.5)
ALP SERPL-CCNC: 69 U/L (ref 50–136)
ALT SERPL-CCNC: 27 U/L (ref 12–65)
ANION GAP SERPL CALC-SCNC: 6 MMOL/L (ref 7–16)
AST SERPL-CCNC: 26 U/L (ref 15–37)
BASOPHILS # BLD: 0.1 K/UL (ref 0–0.2)
BASOPHILS NFR BLD: 1 % (ref 0–2)
BILIRUB SERPL-MCNC: 0.2 MG/DL (ref 0.2–1.1)
BNP SERPL-MCNC: 682 PG/ML (ref 5–125)
BUN SERPL-MCNC: 28 MG/DL (ref 8–23)
CALCIUM SERPL-MCNC: 9.7 MG/DL (ref 8.3–10.4)
CHLORIDE SERPL-SCNC: 99 MMOL/L (ref 98–107)
CO2 SERPL-SCNC: 25 MMOL/L (ref 21–32)
CREAT SERPL-MCNC: 1.36 MG/DL (ref 0.6–1)
DIFFERENTIAL METHOD BLD: ABNORMAL
EOSINOPHIL # BLD: 0.1 K/UL (ref 0–0.8)
EOSINOPHIL NFR BLD: 1 % (ref 0.5–7.8)
ERYTHROCYTE [DISTWIDTH] IN BLOOD BY AUTOMATED COUNT: 14.1 % (ref 11.9–14.6)
GLOBULIN SER CALC-MCNC: 5.2 G/DL (ref 2.3–3.5)
GLUCOSE SERPL-MCNC: 107 MG/DL (ref 65–100)
HCT VFR BLD AUTO: 40.8 % (ref 35.8–46.3)
HGB BLD-MCNC: 13.3 G/DL (ref 11.7–15.4)
IMM GRANULOCYTES # BLD AUTO: 0 K/UL (ref 0–0.5)
IMM GRANULOCYTES NFR BLD AUTO: 0 % (ref 0–5)
LYMPHOCYTES # BLD: 0.5 K/UL (ref 0.5–4.6)
LYMPHOCYTES NFR BLD: 6 % (ref 13–44)
MCH RBC QN AUTO: 29.6 PG (ref 26.1–32.9)
MCHC RBC AUTO-ENTMCNC: 32.6 G/DL (ref 31.4–35)
MCV RBC AUTO: 90.9 FL (ref 79.6–97.8)
MONOCYTES # BLD: 0.4 K/UL (ref 0.1–1.3)
MONOCYTES NFR BLD: 5 % (ref 4–12)
NEUTS SEG # BLD: 8.2 K/UL (ref 1.7–8.2)
NEUTS SEG NFR BLD: 88 % (ref 43–78)
NRBC # BLD: 0 K/UL (ref 0–0.2)
PLATELET # BLD AUTO: 319 K/UL (ref 150–450)
PMV BLD AUTO: 9.3 FL (ref 9.4–12.3)
POTASSIUM SERPL-SCNC: 4.3 MMOL/L (ref 3.5–5.1)
PROT SERPL-MCNC: 8.8 G/DL (ref 6.3–8.2)
RBC # BLD AUTO: 4.49 M/UL (ref 4.05–5.2)
SODIUM SERPL-SCNC: 130 MMOL/L (ref 136–145)
WBC # BLD AUTO: 9.4 K/UL (ref 4.3–11.1)

## 2021-09-03 PROCEDURE — 74011000258 HC RX REV CODE- 258: Performed by: EMERGENCY MEDICINE

## 2021-09-03 PROCEDURE — 96360 HYDRATION IV INFUSION INIT: CPT

## 2021-09-03 PROCEDURE — 71260 CT THORAX DX C+: CPT

## 2021-09-03 PROCEDURE — 71046 X-RAY EXAM CHEST 2 VIEWS: CPT

## 2021-09-03 PROCEDURE — 96361 HYDRATE IV INFUSION ADD-ON: CPT

## 2021-09-03 PROCEDURE — 93005 ELECTROCARDIOGRAM TRACING: CPT | Performed by: EMERGENCY MEDICINE

## 2021-09-03 PROCEDURE — 80053 COMPREHEN METABOLIC PANEL: CPT

## 2021-09-03 PROCEDURE — 85025 COMPLETE CBC W/AUTO DIFF WBC: CPT

## 2021-09-03 PROCEDURE — 83880 ASSAY OF NATRIURETIC PEPTIDE: CPT

## 2021-09-03 PROCEDURE — 96374 THER/PROPH/DIAG INJ IV PUSH: CPT

## 2021-09-03 PROCEDURE — 84484 ASSAY OF TROPONIN QUANT: CPT

## 2021-09-03 PROCEDURE — 99285 EMERGENCY DEPT VISIT HI MDM: CPT

## 2021-09-03 PROCEDURE — 74011000636 HC RX REV CODE- 636: Performed by: EMERGENCY MEDICINE

## 2021-09-03 PROCEDURE — 74011250636 HC RX REV CODE- 250/636: Performed by: EMERGENCY MEDICINE

## 2021-09-03 RX ORDER — SODIUM CHLORIDE 0.9 % (FLUSH) 0.9 %
10 SYRINGE (ML) INJECTION
Status: COMPLETED | OUTPATIENT
Start: 2021-09-03 | End: 2021-09-03

## 2021-09-03 RX ADMIN — Medication 10 ML: at 23:47

## 2021-09-03 RX ADMIN — SODIUM CHLORIDE 100 ML: 900 INJECTION, SOLUTION INTRAVENOUS at 23:47

## 2021-09-03 RX ADMIN — SODIUM CHLORIDE 1000 ML: 900 INJECTION, SOLUTION INTRAVENOUS at 22:15

## 2021-09-03 RX ADMIN — IOPAMIDOL 75 ML: 755 INJECTION, SOLUTION INTRAVENOUS at 23:46

## 2021-09-03 NOTE — ED TRIAGE NOTES
Pt reports she had a D dimer drawn at her pCP and it was greater than 2. States increased shortness of breath for a couple of weeks. Also reports bilateral leg swelling for a couple of weeks as well. Pt in NAD in triage. Reports takes plavix.

## 2021-09-04 VITALS
WEIGHT: 165 LBS | TEMPERATURE: 98.1 F | OXYGEN SATURATION: 96 % | SYSTOLIC BLOOD PRESSURE: 124 MMHG | RESPIRATION RATE: 16 BRPM | BODY MASS INDEX: 25.01 KG/M2 | HEIGHT: 68 IN | DIASTOLIC BLOOD PRESSURE: 67 MMHG | HEART RATE: 115 BPM

## 2021-09-04 LAB
ATRIAL RATE: 86 BPM
CALCULATED P AXIS, ECG09: 88 DEGREES
CALCULATED R AXIS, ECG10: 83 DEGREES
CALCULATED T AXIS, ECG11: 83 DEGREES
DIAGNOSIS, 93000: NORMAL
P-R INTERVAL, ECG05: 148 MS
Q-T INTERVAL, ECG07: 362 MS
QRS DURATION, ECG06: 84 MS
QTC CALCULATION (BEZET), ECG08: 433 MS
TROPONIN-HIGH SENSITIVITY: 11.6 PG/ML (ref 0–14)
TROPONIN-HIGH SENSITIVITY: 11.8 PG/ML (ref 0–14)
VENTRICULAR RATE, ECG03: 86 BPM

## 2021-09-04 PROCEDURE — 74011250636 HC RX REV CODE- 250/636

## 2021-09-04 PROCEDURE — 74011000250 HC RX REV CODE- 250

## 2021-09-04 RX ORDER — IPRATROPIUM BROMIDE AND ALBUTEROL SULFATE 2.5; .5 MG/3ML; MG/3ML
3 SOLUTION RESPIRATORY (INHALATION)
Status: COMPLETED | OUTPATIENT
Start: 2021-09-04 | End: 2021-09-04

## 2021-09-04 RX ADMIN — METHYLPREDNISOLONE SODIUM SUCCINATE 125 MG: 125 INJECTION, POWDER, FOR SOLUTION INTRAMUSCULAR; INTRAVENOUS at 02:49

## 2021-09-04 RX ADMIN — IPRATROPIUM BROMIDE AND ALBUTEROL SULFATE 3 ML: .5; 3 SOLUTION RESPIRATORY (INHALATION) at 02:47

## 2021-09-04 NOTE — PROGRESS NOTES
Triage RN aware that pt needs a 20g iv in the [facility] [place] and needs fluids for Hydration due to labs for ct scan.

## 2021-09-04 NOTE — ED PROVIDER NOTES
2year-old female presents to the ER with concerns over elevated D-dimer. Patient has had several were weeks of increasing shortness of breath. Known history of COPD. Also has multiple stents. She is followed by [facility] pulmonary and [facility] cardiology for these issues. Patient saw her family doctor today lab work was done including a D-dimer. The D-dimer resulted around 2-1/2 today. Patient sent here to the ER for further evaluation    The history is provided by the patient. Shortness of Breath  This is a recurrent problem. The problem occurs frequently. The current episode started more than 1 week ago. The problem has been gradually worsening. Associated symptoms include cough and wheezing. Pertinent negatives include no fever, no headaches, no rhinorrhea, no ear pain, no neck pain, no hemoptysis, no chest pain, no vomiting, no abdominal pain, no rash and no leg pain. It is unknown what precipitated the problem. She has tried beta-agonist inhalers and ipratropium inhalers for the symptoms. The treatment provided mild relief. She has had prior hospitalizations. She has had prior ED visits. Associated medical issues include COPD and CAD. Associated medical issues do not include heart failure.         Past Medical History:   Diagnosis Date    Arrhythmia     Arthritis     CAD (coronary artery disease) 2009, 8/19/2013    PCI,  Jason Ambrose     Carotid stenosis, right     endarterectomy 11/25/19    Chronic anxiety 4/27/2017    COPD     recent referral to [address] for lung transplant    Emphy[facility] lung ([facility] [address]     GERD (gastroesophageal reflux disease)     controlled with medication    History of echocardiogram 06/07/2019    History of echocardiogram     echo 06/07/19 LVEF 55-60%    Hypertension     Nausea & vomiting     Oxygen dependent     1 liter at night    Pleurisy     Thyroid disease     pt denies       Past Surgical History:   Procedure Laterality Date    HX GYN      rebuilt cervix    HX HEART CATHETERIZATION  06/06/2019    last stent- per patient- 10 stents total    HX OTHER SURGICAL      HX TONSIL AND ADENOIDECTOMY      VASCULAR SURGERY PROCEDURE UNLIST Right 11/25/2019    carotid endarterectomy         Family History:   Problem Relation Age of Onset    No Known Problems Mother         adopted       Social History     Socioeconomic History    Marital status:      Spouse name: Not on file    Number of children: Not on file    Years of education: Not on file    Highest education level: Not on file   Occupational History    Occupation: [employer]     Employer: SELF EMPLOYED     Comment: home   Tobacco Use    Smoking status: Former Smoker     Packs/day: 0.75     Years: 53.00     Pack years: 39.75     Types: Cigarettes     Quit date: 2018     Years since quitting: 3.6    Smokeless tobacco: Never Used   Substance and Sexual Activity    Alcohol use: Yes     Comment: rarely    Drug use: No    Sexual activity: Not on file   Other Topics Concern     Service Not Asked    Blood Transfusions Not Asked    Caffeine Concern Not Asked    Occupational Exposure Not Asked    Hobby Hazards Not Asked    Sleep Concern Not Asked    Stress Concern Not Asked    Weight Concern Not Asked    Special Diet Not Asked    Back Care Not Asked    Exercise Not Asked    Bike Helmet Not Asked   [address] [facility] Not Asked    Self-Exams Not Asked   Social History Narrative    , has 2 children, 1 step child. 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      [facility] Strain:     Difficulty of Paying Living Expenses:    Food Insecurity:     Worried About [facility] in the Last Year:    [address] in the Last Year:    Transportation Needs:     Lack of Transportation (Medical):      Lack of Transportation (Non-Medical):    Physical Activity:     Days of Exercise per Week:     Minutes of Exercise per Session:    Stress:     Feeling of Stress :    Social Connections:     Frequency of Communication with Friends and Family:     Frequency of Social Gatherings with Friends and Family:     Attends Shinto Services:     Active Member of Clubs or Organizations:     Attends Club or Organization Meetings:     Marital Status:    Intimate Partner Violence:     Fear of Current or Ex-Partner:     Emotionally Abused:     Physically Abused:     Sexually Abused: ALLERGIES: Promethazine    Review of Systems   Constitutional: Negative for chills and fever. HENT: Negative for congestion, ear pain and rhinorrhea. Eyes: Negative for photophobia and discharge. Respiratory: Positive for cough, shortness of breath and wheezing. Negative for hemoptysis. Cardiovascular: Negative for chest pain and palpitations. Gastrointestinal: Negative for abdominal pain, constipation, diarrhea and vomiting. Endocrine: Negative for cold intolerance and heat intolerance. Genitourinary: Negative for dysuria and flank pain. Musculoskeletal: Positive for arthralgias. Negative for myalgias and neck pain. Skin: Negative for rash and wound. Allergic/Immunologic: Negative for environmental allergies and food allergies. Neurological: Negative for syncope and headaches. Hematological: Negative for adenopathy. Does not bruise/bleed easily. Psychiatric/Behavioral: Negative for dysphoric mood. The patient is not nervous/anxious. All other systems reviewed and are negative. Vitals:    09/03/21 1859 09/03/21 2056 09/03/21 2057   BP: (!) 117/55 (!) 128/59    Pulse: 85 80 80   Resp: 16 16    Temp: 98.1 °F (36.7 °C)     SpO2: 95% 96% 96%   Weight: 74.8 kg (165 lb)     Height: 5' 8\" (1.727 m)              Physical Exam  Vitals and nursing note reviewed. Constitutional:       General: She is in acute distress. Appearance: Normal appearance. She is well-developed and normal weight. HENT:      Head: Normocephalic and atraumatic. Right Ear: External ear normal.      Left Ear: External ear normal.      Mouth/Throat:      Pharynx: No oropharyngeal exudate. Eyes:      Extraocular Movements: Extraocular movements intact. Conjunctiva/sclera: Conjunctivae normal.      Pupils: Pupils are equal, round, and reactive to light. Neck:      Vascular: No JVD. Cardiovascular:      Rate and Rhythm: Normal rate and regular rhythm. Heart sounds: Normal heart sounds. No murmur heard. No friction rub. No gallop. Pulmonary:      Effort: Pulmonary effort is normal.      Breath sounds: Normal breath sounds. No decreased breath sounds or wheezing. Abdominal:      General: Bowel sounds are normal. There is no distension. Palpations: Abdomen is soft. There is no mass. Tenderness: There is no abdominal tenderness. Musculoskeletal:         General: No deformity. Normal range of motion. Cervical back: Normal range of motion and neck supple. Skin:     General: Skin is warm and dry. Capillary Refill: Capillary refill takes less than 2 seconds. Findings: No rash. Neurological:      General: No focal deficit present. Mental Status: She is alert and oriented to person, place, and time. Cranial Nerves: No cranial nerve deficit. Sensory: No sensory deficit. Gait: Gait normal.   Psychiatric:         Mood and Affect: Mood normal.         Speech: Speech normal.         Behavior: Behavior normal.         Thought Content: Thought content normal.         Judgment: Judgment normal.          MDM  Number of Diagnoses or Management Options  Diagnosis management comments: Outpatient labs reviewed    EKG reviewed      Patient did have an elevated D-dimer. She does have some mild renal insufficiency on today's labs as well.  We will start IV fluids obtain AC IV and proceed to CT PE protocol    CT returns no acute process No PE    Delay in getting second troponin but it is normal.    Patient SaO2 staying at 100% on 1 L of oxygen. Patient has oxygen at home which she can use. Patient denies fevers or chills. Patient recently was started on diuretics Aldactone she is only taken a few days. Appears on her lab test with elevated BNP this could be adding to her COPD causing to have more exertional dyspnea. We discussed adding a additional or increasing her diuretics however she states she just started on them and they have not had time to really take effect yet. We will allow her cardiologist to decide any further medication changes. Amount and/or Complexity of Data Reviewed  Clinical lab tests: ordered and reviewed  Tests in the radiology section of CPT®: ordered  Tests in the medicine section of CPT®: ordered and reviewed  Decide to obtain previous medical records or to obtain history from someone other than the patient: yes  Review and summarize past medical records: yes  Independent visualization of images, tracings, or specimens: yes    Risk of Complications, Morbidity, and/or Mortality  Presenting problems: moderate  Diagnostic procedures: moderate  Management options: moderate  General comments: Elements of this note have been dictated via voice recognition software. Text and phrases may be limited by the accuracy of the software. The chart has been reviewed, but errors may still be present.              EKG    Date/Time: 9/3/2021 9:59 PM  Performed by: Rodolfo Galaviz MD  Authorized by: Rodolfo Galaviz MD     ECG reviewed by ED Physician in the absence of a cardiologist: yes    Previous ECG:     Previous ECG:  Compared to current    Similarity:  No change  Interpretation:     Interpretation: non-specific    Rate:     ECG rate assessment: normal    Rhythm:     Rhythm: sinus rhythm    Ectopy:     Ectopy: none    QRS:     QRS axis:  Normal    QRS intervals:  Normal  Conduction:     Conduction: normal    ST segments:     ST segments:  Normal  T waves:     T waves: normal    Comments:      No acute ischemic changes  Pulmonary strain pattern

## 2021-09-04 NOTE — ED NOTES
I have reviewed discharge instructions with the patient. The patient verbalized understanding. Patient left ED via Discharge Method: ambulatory to Home with self. Opportunity for questions and clarification provided. Patient given 0 scripts. To continue your aftercare when you leave the hospital, you may receive an automated call from our care team to check in on how you are doing. This is a free service and part of our promise to provide the best care and service to meet your aftercare needs.  If you have questions, or wish to unsubscribe from this service please call [phone]. Thank you for Choosing our [facility] Emergency Department.

## 2021-09-04 NOTE — ED NOTES
Call received from CT requesting AC IV on pt. Unable to obtain with initial attempt in triage. Only able to place 22g to left forearm. CT also requesting administration of IVFs prior to CT. Charge nurse charan aware.

## 2021-10-08 ENCOUNTER — TRANSCRIBE ORDER (OUTPATIENT)
Dept: SCHEDULING | Age: 71
End: 2021-10-08

## 2021-10-08 ENCOUNTER — HOSPITAL ENCOUNTER (OUTPATIENT)
Dept: CT IMAGING | Age: 71
Discharge: HOME | End: 2021-10-08
Attending: INTERNAL MEDICINE

## 2021-10-08 DIAGNOSIS — I71.40 ABDOMINAL ANEURYSM: Primary | ICD-10-CM

## 2021-10-08 DIAGNOSIS — E28.319 ASYMPTOMATIC PREMATURE MENOPAUSE: ICD-10-CM

## 2021-10-08 DIAGNOSIS — Z13.820 SCREENING FOR OSTEOPOROSIS: Primary | ICD-10-CM

## 2021-10-08 DIAGNOSIS — Z87.891 PERSONAL HISTORY OF TOBACCO USE: ICD-10-CM

## 2021-10-08 DIAGNOSIS — Z12.31 ENCOUNTER FOR SCREENING MAMMOGRAM FOR MALIGNANT NEOPLASM OF BREAST: Primary | ICD-10-CM

## 2021-10-08 DIAGNOSIS — R91.1 PULMONARY NODULE: ICD-10-CM

## 2021-11-09 PROBLEM — M54.50 ACUTE BILATERAL LOW BACK PAIN WITHOUT SCIATICA: Status: ACTIVE | Noted: 2021-11-09

## 2021-11-09 PROBLEM — Z66 DNR (DO NOT RESUSCITATE): Status: ACTIVE | Noted: 2021-11-09

## 2022-01-01 ENCOUNTER — TELEPHONE (OUTPATIENT)
Dept: ONCOLOGY | Age: 72
End: 2022-01-01

## 2022-01-01 ENCOUNTER — HOSPITAL ENCOUNTER (INPATIENT)
Age: 72
LOS: 2 days | DRG: 871 | End: 2023-01-01
Attending: EMERGENCY MEDICINE | Admitting: INTERNAL MEDICINE
Payer: MEDICARE

## 2022-01-01 ENCOUNTER — HOSPITAL ENCOUNTER (OUTPATIENT)
Dept: INFUSION THERAPY | Age: 72
End: 2022-01-01

## 2022-01-01 ENCOUNTER — TELEPHONE (OUTPATIENT)
Dept: PULMONOLOGY | Age: 72
End: 2022-01-01

## 2022-01-01 ENCOUNTER — APPOINTMENT (OUTPATIENT)
Dept: GENERAL RADIOLOGY | Age: 72
DRG: 871 | End: 2022-01-01
Payer: MEDICARE

## 2022-01-01 DIAGNOSIS — J44.1 COPD EXACERBATION (HCC): Primary | ICD-10-CM

## 2022-01-01 DIAGNOSIS — J96.21 ACUTE ON CHRONIC RESPIRATORY FAILURE WITH HYPOXIA (HCC): ICD-10-CM

## 2022-01-01 DIAGNOSIS — R06.03 ACUTE RESPIRATORY DISTRESS: ICD-10-CM

## 2022-01-01 LAB
ALBUMIN SERPL-MCNC: 3.1 G/DL (ref 3.2–4.6)
ALBUMIN/GLOB SERPL: 0.7 (ref 0.4–1.6)
ALP SERPL-CCNC: 62 U/L (ref 50–136)
ALT SERPL-CCNC: 23 U/L (ref 12–65)
ANION GAP SERPL CALC-SCNC: 6 MMOL/L (ref 2–11)
ANION GAP SERPL CALC-SCNC: 7 MMOL/L (ref 2–11)
APPEARANCE UR: ABNORMAL
ARTERIAL PATENCY WRIST A: ABNORMAL
ARTERIAL PATENCY WRIST A: POSITIVE
AST SERPL-CCNC: 20 U/L (ref 15–37)
BACTERIA URNS QL MICRO: 0 /HPF
BASE EXCESS BLD CALC-SCNC: 3.8 MMOL/L
BASE EXCESS BLDV CALC-SCNC: 1.5 MMOL/L
BASE EXCESS BLDV CALC-SCNC: 3.7 MMOL/L
BASOPHILS # BLD: 0 K/UL (ref 0–0.2)
BASOPHILS # BLD: 0 K/UL (ref 0–0.2)
BASOPHILS NFR BLD: 0 % (ref 0–2)
BASOPHILS NFR BLD: 0 % (ref 0–2)
BDY SITE: ABNORMAL
BDY SITE: ABNORMAL
BILIRUB SERPL-MCNC: 0.5 MG/DL (ref 0.2–1.1)
BILIRUB UR QL: ABNORMAL
BUN SERPL-MCNC: 30 MG/DL (ref 8–23)
BUN SERPL-MCNC: 9 MG/DL (ref 8–23)
CALCIUM SERPL-MCNC: 8.9 MG/DL (ref 8.3–10.4)
CALCIUM SERPL-MCNC: 9.2 MG/DL (ref 8.3–10.4)
CHLORIDE SERPL-SCNC: 100 MMOL/L (ref 101–110)
CHLORIDE SERPL-SCNC: 102 MMOL/L (ref 101–110)
CO2 SERPL-SCNC: 29 MMOL/L (ref 21–32)
CO2 SERPL-SCNC: 29 MMOL/L (ref 21–32)
COLOR UR: ABNORMAL
CREAT SERPL-MCNC: 0.7 MG/DL (ref 0.6–1)
CREAT SERPL-MCNC: 1.2 MG/DL (ref 0.6–1)
DIFFERENTIAL METHOD BLD: ABNORMAL
DIFFERENTIAL METHOD BLD: ABNORMAL
EKG ATRIAL RATE: 104 BPM
EKG ATRIAL RATE: 144 BPM
EKG DIAGNOSIS: NORMAL
EKG DIAGNOSIS: NORMAL
EKG P AXIS: 104 DEGREES
EKG P-R INTERVAL: 128 MS
EKG Q-T INTERVAL: 342 MS
EKG Q-T INTERVAL: 372 MS
EKG QRS DURATION: 78 MS
EKG QRS DURATION: 84 MS
EKG QTC CALCULATION (BAZETT): 449 MS
EKG QTC CALCULATION (BAZETT): 525 MS
EKG R AXIS: 51 DEGREES
EKG R AXIS: 74 DEGREES
EKG T AXIS: 103 DEGREES
EKG T AXIS: 91 DEGREES
EKG VENTRICULAR RATE: 104 BPM
EKG VENTRICULAR RATE: 120 BPM
EOSINOPHIL # BLD: 0 K/UL (ref 0–0.8)
EOSINOPHIL # BLD: 0 K/UL (ref 0–0.8)
EOSINOPHIL NFR BLD: 0 % (ref 0.5–7.8)
EOSINOPHIL NFR BLD: 0 % (ref 0.5–7.8)
EPI CELLS #/AREA URNS HPF: ABNORMAL /HPF
ERYTHROCYTE [DISTWIDTH] IN BLOOD BY AUTOMATED COUNT: 17 % (ref 11.9–14.6)
ERYTHROCYTE [DISTWIDTH] IN BLOOD BY AUTOMATED COUNT: 17.2 % (ref 11.9–14.6)
GAS FLOW.O2 O2 DELIVERY SYS: ABNORMAL
GAS FLOW.O2 O2 DELIVERY SYS: ABNORMAL
GLOBULIN SER CALC-MCNC: 4.2 G/DL (ref 2.8–4.5)
GLUCOSE SERPL-MCNC: 136 MG/DL (ref 65–100)
GLUCOSE SERPL-MCNC: 92 MG/DL (ref 65–100)
GLUCOSE UR STRIP.AUTO-MCNC: NEGATIVE MG/DL
HCO3 BLD-SCNC: 31.8 MMOL/L (ref 22–26)
HCO3 BLDV-SCNC: 30.8 MMOL/L (ref 23–28)
HCO3 BLDV-SCNC: 32 MMOL/L (ref 23–28)
HCT VFR BLD AUTO: 29.8 % (ref 35.8–46.3)
HCT VFR BLD AUTO: 32.6 % (ref 35.8–46.3)
HGB BLD-MCNC: 10.5 G/DL (ref 11.7–15.4)
HGB BLD-MCNC: 9.1 G/DL (ref 11.7–15.4)
HGB UR QL STRIP: ABNORMAL
IMM GRANULOCYTES # BLD AUTO: 0 K/UL (ref 0–0.5)
IMM GRANULOCYTES # BLD AUTO: 0 K/UL (ref 0–0.5)
IMM GRANULOCYTES NFR BLD AUTO: 0 % (ref 0–5)
IMM GRANULOCYTES NFR BLD AUTO: 0 % (ref 0–5)
INSPIRATION.DURATION SETTING TIME VENT: 0.85 SEC
IPAP/PIP/HIGH PEEP: 16
KETONES UR QL STRIP.AUTO: NEGATIVE MG/DL
LACTATE SERPL-SCNC: 1.1 MMOL/L (ref 0.4–2)
LEUKOCYTE ESTERASE UR QL STRIP.AUTO: ABNORMAL
LYMPHOCYTES # BLD: 0.1 K/UL (ref 0.5–4.6)
LYMPHOCYTES # BLD: 0.2 K/UL (ref 0.5–4.6)
LYMPHOCYTES NFR BLD: 1 % (ref 13–44)
LYMPHOCYTES NFR BLD: 2 % (ref 13–44)
MAGNESIUM SERPL-MCNC: 2.1 MG/DL (ref 1.8–2.4)
MCH RBC QN AUTO: 32.4 PG (ref 26.1–32.9)
MCH RBC QN AUTO: 32.7 PG (ref 26.1–32.9)
MCHC RBC AUTO-ENTMCNC: 30.5 G/DL (ref 31.4–35)
MCHC RBC AUTO-ENTMCNC: 32.2 G/DL (ref 31.4–35)
MCV RBC AUTO: 101.6 FL (ref 82–102)
MCV RBC AUTO: 106 FL (ref 82–102)
MONOCYTES # BLD: 0.3 K/UL (ref 0.1–1.3)
MONOCYTES # BLD: 0.7 K/UL (ref 0.1–1.3)
MONOCYTES NFR BLD: 3 % (ref 4–12)
MONOCYTES NFR BLD: 8 % (ref 4–12)
NEUTS SEG # BLD: 8.5 K/UL (ref 1.7–8.2)
NEUTS SEG # BLD: 9.4 K/UL (ref 1.7–8.2)
NEUTS SEG NFR BLD: 90 % (ref 43–78)
NEUTS SEG NFR BLD: 96 % (ref 43–78)
NITRITE UR QL STRIP.AUTO: POSITIVE
NRBC # BLD: 0 K/UL (ref 0–0.2)
NRBC # BLD: 0 K/UL (ref 0–0.2)
NT PRO BNP: ABNORMAL PG/ML (ref 5–125)
O2/TOTAL GAS SETTING VFR VENT: 100 %
O2/TOTAL GAS SETTING VFR VENT: 40 %
OTHER OBSERVATIONS: ABNORMAL
PCO2 BLD: 63.8 MMHG (ref 35–45)
PCO2 BLDV: 67.9 MMHG (ref 41–51)
PCO2 BLDV: 74.2 MMHG (ref 41–51)
PEEP RESPIRATORY: 6 CMH2O
PH BLD: 7.31 (ref 7.35–7.45)
PH BLDV: 7.23 (ref 7.32–7.42)
PH BLDV: 7.28 (ref 7.32–7.42)
PH UR STRIP: 5.5 (ref 5–9)
PLATELET # BLD AUTO: 200 K/UL (ref 150–450)
PLATELET # BLD AUTO: 224 K/UL (ref 150–450)
PMV BLD AUTO: 9.6 FL (ref 9.4–12.3)
PMV BLD AUTO: 9.7 FL (ref 9.4–12.3)
PO2 BLD: 558 MMHG (ref 75–100)
PO2 BLDV: 40 MMHG
PO2 BLDV: 47 MMHG
POTASSIUM SERPL-SCNC: 4 MMOL/L (ref 3.5–5.1)
POTASSIUM SERPL-SCNC: 4.7 MMOL/L (ref 3.5–5.1)
PRESSURE SUPPORT SETTING VENT: 10 CMH2O
PROCALCITONIN SERPL-MCNC: <0.05 NG/ML (ref 0–0.49)
PROT SERPL-MCNC: 7.3 G/DL (ref 6.3–8.2)
PROT UR STRIP-MCNC: 300 MG/DL
RBC # BLD AUTO: 2.81 M/UL (ref 4.05–5.2)
RBC # BLD AUTO: 3.21 M/UL (ref 4.05–5.2)
RBC #/AREA URNS HPF: >100 /HPF
RESPIRATORY RATE, POC: 18 (ref 5–40)
SAO2 % BLD: 100 % (ref 95–98)
SAO2 % BLDV: 65.5 % (ref 65–88)
SAO2 % BLDV: 71.9 % (ref 65–88)
SERVICE CMNT-IMP: 8.7
SERVICE CMNT-IMP: ABNORMAL
SODIUM SERPL-SCNC: 135 MMOL/L (ref 133–143)
SODIUM SERPL-SCNC: 138 MMOL/L (ref 133–143)
SP GR UR REFRACTOMETRY: 1.01 (ref 1–1.02)
SPECIMEN TYPE: ABNORMAL
TROPONIN I SERPL HS-MCNC: 70 PG/ML (ref 0–14)
UROBILINOGEN UR QL STRIP.AUTO: 0.2 EU/DL (ref 0.2–1)
VENTILATION MODE VENT: ABNORMAL
VT SETTING VENT: 674 ML
WBC # BLD AUTO: 9.5 K/UL (ref 4.3–11.1)
WBC # BLD AUTO: 9.8 K/UL (ref 4.3–11.1)
WBC URNS QL MICRO: ABNORMAL /HPF

## 2022-01-01 PROCEDURE — 6360000002 HC RX W HCPCS: Performed by: INTERNAL MEDICINE

## 2022-01-01 PROCEDURE — 84484 ASSAY OF TROPONIN QUANT: CPT

## 2022-01-01 PROCEDURE — 94660 CPAP INITIATION&MGMT: CPT

## 2022-01-01 PROCEDURE — 6360000002 HC RX W HCPCS

## 2022-01-01 PROCEDURE — 6370000000 HC RX 637 (ALT 250 FOR IP): Performed by: INTERNAL MEDICINE

## 2022-01-01 PROCEDURE — 2580000003 HC RX 258: Performed by: INTERNAL MEDICINE

## 2022-01-01 PROCEDURE — 5A09457 ASSISTANCE WITH RESPIRATORY VENTILATION, 24-96 CONSECUTIVE HOURS, CONTINUOUS POSITIVE AIRWAY PRESSURE: ICD-10-PCS | Performed by: INTERNAL MEDICINE

## 2022-01-01 PROCEDURE — 99285 EMERGENCY DEPT VISIT HI MDM: CPT

## 2022-01-01 PROCEDURE — 96366 THER/PROPH/DIAG IV INF ADDON: CPT

## 2022-01-01 PROCEDURE — 96375 TX/PRO/DX INJ NEW DRUG ADDON: CPT

## 2022-01-01 PROCEDURE — 99223 1ST HOSP IP/OBS HIGH 75: CPT | Performed by: INTERNAL MEDICINE

## 2022-01-01 PROCEDURE — 2100000000 HC CCU R&B

## 2022-01-01 PROCEDURE — 83605 ASSAY OF LACTIC ACID: CPT

## 2022-01-01 PROCEDURE — 80053 COMPREHEN METABOLIC PANEL: CPT

## 2022-01-01 PROCEDURE — 2580000003 HC RX 258

## 2022-01-01 PROCEDURE — 96365 THER/PROPH/DIAG IV INF INIT: CPT

## 2022-01-01 PROCEDURE — 2500000003 HC RX 250 WO HCPCS

## 2022-01-01 PROCEDURE — 93005 ELECTROCARDIOGRAM TRACING: CPT

## 2022-01-01 PROCEDURE — 87040 BLOOD CULTURE FOR BACTERIA: CPT

## 2022-01-01 PROCEDURE — 94640 AIRWAY INHALATION TREATMENT: CPT

## 2022-01-01 PROCEDURE — 87086 URINE CULTURE/COLONY COUNT: CPT

## 2022-01-01 PROCEDURE — 6370000000 HC RX 637 (ALT 250 FOR IP)

## 2022-01-01 PROCEDURE — 36600 WITHDRAWAL OF ARTERIAL BLOOD: CPT

## 2022-01-01 PROCEDURE — 99291 CRITICAL CARE FIRST HOUR: CPT | Performed by: INTERNAL MEDICINE

## 2022-01-01 PROCEDURE — 80048 BASIC METABOLIC PNL TOTAL CA: CPT

## 2022-01-01 PROCEDURE — 96376 TX/PRO/DX INJ SAME DRUG ADON: CPT

## 2022-01-01 PROCEDURE — 85025 COMPLETE CBC W/AUTO DIFF WBC: CPT

## 2022-01-01 PROCEDURE — 84145 PROCALCITONIN (PCT): CPT

## 2022-01-01 PROCEDURE — 83880 ASSAY OF NATRIURETIC PEPTIDE: CPT

## 2022-01-01 PROCEDURE — 36415 COLL VENOUS BLD VENIPUNCTURE: CPT

## 2022-01-01 PROCEDURE — 83735 ASSAY OF MAGNESIUM: CPT

## 2022-01-01 PROCEDURE — 82803 BLOOD GASES ANY COMBINATION: CPT

## 2022-01-01 PROCEDURE — 81001 URINALYSIS AUTO W/SCOPE: CPT

## 2022-01-01 PROCEDURE — 71045 X-RAY EXAM CHEST 1 VIEW: CPT

## 2022-01-01 PROCEDURE — 94664 DEMO&/EVAL PT USE INHALER: CPT

## 2022-01-01 RX ORDER — GLYCOPYRROLATE 0.2 MG/ML
0.1 INJECTION INTRAMUSCULAR; INTRAVENOUS EVERY 6 HOURS PRN
Status: DISCONTINUED | OUTPATIENT
Start: 2022-01-01 | End: 2023-01-02 | Stop reason: HOSPADM

## 2022-01-01 RX ORDER — LORAZEPAM 2 MG/ML
2 INJECTION INTRAMUSCULAR
Status: DISCONTINUED | OUTPATIENT
Start: 2022-01-01 | End: 2023-01-02 | Stop reason: HOSPADM

## 2022-01-01 RX ORDER — ENOXAPARIN SODIUM 100 MG/ML
40 INJECTION SUBCUTANEOUS EVERY 24 HOURS
Status: DISCONTINUED | OUTPATIENT
Start: 2022-01-01 | End: 2022-01-01

## 2022-01-01 RX ORDER — ALBUTEROL SULFATE 2.5 MG/3ML
2.5 SOLUTION RESPIRATORY (INHALATION) EVERY 4 HOURS PRN
Status: DISCONTINUED | OUTPATIENT
Start: 2022-01-01 | End: 2023-01-02 | Stop reason: HOSPADM

## 2022-01-01 RX ORDER — CLOPIDOGREL BISULFATE 75 MG/1
75 TABLET ORAL DAILY
Status: DISCONTINUED | OUTPATIENT
Start: 2022-01-01 | End: 2022-01-01

## 2022-01-01 RX ORDER — ESCITALOPRAM OXALATE 10 MG/1
10 TABLET ORAL DAILY
Status: DISCONTINUED | OUTPATIENT
Start: 2022-01-01 | End: 2022-01-01

## 2022-01-01 RX ORDER — METHIMAZOLE 5 MG/1
5 TABLET ORAL DAILY
Status: DISCONTINUED | OUTPATIENT
Start: 2022-01-01 | End: 2022-01-01

## 2022-01-01 RX ORDER — MORPHINE SULFATE 2 MG/ML
2 INJECTION, SOLUTION INTRAMUSCULAR; INTRAVENOUS ONCE
Status: COMPLETED | OUTPATIENT
Start: 2022-01-01 | End: 2022-01-01

## 2022-01-01 RX ORDER — METOPROLOL SUCCINATE 50 MG/1
100 TABLET, EXTENDED RELEASE ORAL DAILY
Status: DISCONTINUED | OUTPATIENT
Start: 2022-01-01 | End: 2022-01-01

## 2022-01-01 RX ORDER — SODIUM CHLORIDE FOR INHALATION 3 %
4 VIAL, NEBULIZER (ML) INHALATION 2 TIMES DAILY
Qty: 240 ML | Refills: 11 | Status: SHIPPED | OUTPATIENT
Start: 2022-01-01

## 2022-01-01 RX ORDER — EZETIMIBE 10 MG/1
10 TABLET ORAL NIGHTLY
Status: DISCONTINUED | OUTPATIENT
Start: 2022-01-01 | End: 2022-01-01

## 2022-01-01 RX ORDER — METOPROLOL TARTRATE 5 MG/5ML
5 INJECTION INTRAVENOUS
Status: COMPLETED | OUTPATIENT
Start: 2022-01-01 | End: 2022-01-01

## 2022-01-01 RX ORDER — 0.9 % SODIUM CHLORIDE 0.9 %
500 INTRAVENOUS SOLUTION INTRAVENOUS ONCE
Status: COMPLETED | OUTPATIENT
Start: 2022-01-01 | End: 2022-01-01

## 2022-01-01 RX ORDER — ROFLUMILAST 500 UG/1
500 TABLET ORAL NIGHTLY
Status: DISCONTINUED | OUTPATIENT
Start: 2022-01-01 | End: 2022-01-01

## 2022-01-01 RX ORDER — ONDANSETRON 2 MG/ML
4 INJECTION INTRAMUSCULAR; INTRAVENOUS EVERY 6 HOURS PRN
Status: DISCONTINUED | OUTPATIENT
Start: 2022-01-01 | End: 2023-01-02 | Stop reason: HOSPADM

## 2022-01-01 RX ORDER — DOCUSATE SODIUM 100 MG/1
200 CAPSULE, LIQUID FILLED ORAL NIGHTLY
Status: DISCONTINUED | OUTPATIENT
Start: 2022-01-01 | End: 2022-01-01

## 2022-01-01 RX ORDER — LORAZEPAM 1 MG/1
1 TABLET ORAL EVERY 6 HOURS PRN
Status: DISCONTINUED | OUTPATIENT
Start: 2022-01-01 | End: 2022-01-01

## 2022-01-01 RX ORDER — ACETAMINOPHEN 650 MG/1
650 SUPPOSITORY RECTAL EVERY 6 HOURS PRN
Status: DISCONTINUED | OUTPATIENT
Start: 2022-01-01 | End: 2022-01-01

## 2022-01-01 RX ORDER — MORPHINE SULFATE 4 MG/ML
4 INJECTION INTRAVENOUS ONCE
Status: COMPLETED | OUTPATIENT
Start: 2022-01-01 | End: 2022-01-01

## 2022-01-01 RX ORDER — PREDNISONE 20 MG/1
40 TABLET ORAL DAILY
Status: DISCONTINUED | OUTPATIENT
Start: 2023-01-01 | End: 2022-01-01

## 2022-01-01 RX ORDER — FUROSEMIDE 10 MG/ML
20 INJECTION INTRAMUSCULAR; INTRAVENOUS ONCE
Status: COMPLETED | OUTPATIENT
Start: 2022-01-01 | End: 2022-01-01

## 2022-01-01 RX ORDER — PANTOPRAZOLE SODIUM 40 MG/1
40 TABLET, DELAYED RELEASE ORAL 2 TIMES DAILY
Status: DISCONTINUED | OUTPATIENT
Start: 2022-01-01 | End: 2022-01-01

## 2022-01-01 RX ORDER — SCOLOPAMINE TRANSDERMAL SYSTEM 1 MG/1
1 PATCH, EXTENDED RELEASE TRANSDERMAL
Status: DISCONTINUED | OUTPATIENT
Start: 2022-01-01 | End: 2023-01-02 | Stop reason: HOSPADM

## 2022-01-01 RX ORDER — MORPHINE SULFATE 2 MG/ML
2 INJECTION, SOLUTION INTRAMUSCULAR; INTRAVENOUS
Status: DISCONTINUED | OUTPATIENT
Start: 2022-01-01 | End: 2023-01-02 | Stop reason: HOSPADM

## 2022-01-01 RX ORDER — SODIUM CHLORIDE 0.9 % (FLUSH) 0.9 %
5-40 SYRINGE (ML) INJECTION EVERY 12 HOURS SCHEDULED
Status: DISCONTINUED | OUTPATIENT
Start: 2022-01-01 | End: 2023-01-02 | Stop reason: HOSPADM

## 2022-01-01 RX ORDER — SODIUM CHLORIDE 9 MG/ML
INJECTION, SOLUTION INTRAVENOUS PRN
Status: DISCONTINUED | OUTPATIENT
Start: 2022-01-01 | End: 2022-01-01

## 2022-01-01 RX ORDER — SODIUM CHLORIDE 0.9 % (FLUSH) 0.9 %
5-40 SYRINGE (ML) INJECTION PRN
Status: DISCONTINUED | OUTPATIENT
Start: 2022-01-01 | End: 2023-01-02 | Stop reason: HOSPADM

## 2022-01-01 RX ORDER — ACETAMINOPHEN 325 MG/1
650 TABLET ORAL EVERY 6 HOURS PRN
Status: DISCONTINUED | OUTPATIENT
Start: 2022-01-01 | End: 2022-01-01

## 2022-01-01 RX ORDER — IPRATROPIUM BROMIDE AND ALBUTEROL SULFATE 2.5; .5 MG/3ML; MG/3ML
1 SOLUTION RESPIRATORY (INHALATION)
Status: COMPLETED | OUTPATIENT
Start: 2022-01-01 | End: 2022-01-01

## 2022-01-01 RX ORDER — IPRATROPIUM BROMIDE AND ALBUTEROL SULFATE 2.5; .5 MG/3ML; MG/3ML
1 SOLUTION RESPIRATORY (INHALATION)
Status: DISCONTINUED | OUTPATIENT
Start: 2022-01-01 | End: 2022-01-01

## 2022-01-01 RX ORDER — LOSARTAN POTASSIUM 50 MG/1
25 TABLET ORAL DAILY
Status: DISCONTINUED | OUTPATIENT
Start: 2022-01-01 | End: 2022-01-01

## 2022-01-01 RX ORDER — DEXMEDETOMIDINE HYDROCHLORIDE 4 UG/ML
.1-1.5 INJECTION, SOLUTION INTRAVENOUS CONTINUOUS
Status: DISCONTINUED | OUTPATIENT
Start: 2022-01-01 | End: 2022-01-01

## 2022-01-01 RX ORDER — BUDESONIDE 0.5 MG/2ML
0.5 INHALANT ORAL 2 TIMES DAILY
Status: DISCONTINUED | OUTPATIENT
Start: 2022-01-01 | End: 2022-01-01

## 2022-01-01 RX ORDER — ATORVASTATIN CALCIUM 40 MG/1
80 TABLET, FILM COATED ORAL NIGHTLY
Status: DISCONTINUED | OUTPATIENT
Start: 2022-01-01 | End: 2022-01-01

## 2022-01-01 RX ORDER — MORPHINE SULFATE 2 MG/ML
2 INJECTION, SOLUTION INTRAMUSCULAR; INTRAVENOUS
Status: DISCONTINUED | OUTPATIENT
Start: 2022-01-01 | End: 2022-01-01

## 2022-01-01 RX ORDER — ONDANSETRON 4 MG/1
4 TABLET, ORALLY DISINTEGRATING ORAL EVERY 8 HOURS PRN
Status: DISCONTINUED | OUTPATIENT
Start: 2022-01-01 | End: 2023-01-02 | Stop reason: HOSPADM

## 2022-01-01 RX ORDER — POLYETHYLENE GLYCOL 3350 17 G/17G
17 POWDER, FOR SOLUTION ORAL DAILY PRN
Status: DISCONTINUED | OUTPATIENT
Start: 2022-01-01 | End: 2022-01-01

## 2022-01-01 RX ORDER — NOREPINEPHRINE BIT/0.9 % NACL 16MG/250ML
1-100 INFUSION BOTTLE (ML) INTRAVENOUS CONTINUOUS
Status: DISCONTINUED | OUTPATIENT
Start: 2022-01-01 | End: 2022-01-01

## 2022-01-01 RX ORDER — ASPIRIN 81 MG/1
81 TABLET, CHEWABLE ORAL DAILY
Status: DISCONTINUED | OUTPATIENT
Start: 2022-01-01 | End: 2022-01-01

## 2022-01-01 RX ORDER — METHYLPREDNISOLONE SODIUM SUCCINATE 40 MG/ML
40 INJECTION, POWDER, LYOPHILIZED, FOR SOLUTION INTRAMUSCULAR; INTRAVENOUS EVERY 6 HOURS
Status: DISCONTINUED | OUTPATIENT
Start: 2022-01-01 | End: 2022-01-01

## 2022-01-01 RX ADMIN — AMIODARONE HYDROCHLORIDE 0.5 MG/MIN: 50 INJECTION, SOLUTION INTRAVENOUS at 07:59

## 2022-01-01 RX ADMIN — MORPHINE SULFATE 2 MG: 2 INJECTION, SOLUTION INTRAMUSCULAR; INTRAVENOUS at 16:51

## 2022-01-01 RX ADMIN — BUDESONIDE 500 MCG: 0.5 INHALANT RESPIRATORY (INHALATION) at 07:54

## 2022-01-01 RX ADMIN — BUDESONIDE 500 MCG: 0.5 INHALANT RESPIRATORY (INHALATION) at 19:32

## 2022-01-01 RX ADMIN — MORPHINE SULFATE 2 MG: 2 INJECTION, SOLUTION INTRAMUSCULAR; INTRAVENOUS at 21:23

## 2022-01-01 RX ADMIN — AMIODARONE HYDROCHLORIDE 1 MG/MIN: 50 INJECTION, SOLUTION INTRAVENOUS at 23:46

## 2022-01-01 RX ADMIN — BUDESONIDE 500 MCG: 0.5 INHALANT RESPIRATORY (INHALATION) at 08:36

## 2022-01-01 RX ADMIN — MORPHINE SULFATE 2 MG: 2 INJECTION, SOLUTION INTRAMUSCULAR; INTRAVENOUS at 00:40

## 2022-01-01 RX ADMIN — AZITHROMYCIN MONOHYDRATE 500 MG: 500 INJECTION, POWDER, LYOPHILIZED, FOR SOLUTION INTRAVENOUS at 13:00

## 2022-01-01 RX ADMIN — MORPHINE SULFATE 2 MG: 2 INJECTION, SOLUTION INTRAMUSCULAR; INTRAVENOUS at 07:30

## 2022-01-01 RX ADMIN — PANTOPRAZOLE SODIUM 40 MG: 40 TABLET, DELAYED RELEASE ORAL at 13:21

## 2022-01-01 RX ADMIN — CEFTRIAXONE SODIUM 1000 MG: 1 INJECTION, POWDER, FOR SOLUTION INTRAMUSCULAR; INTRAVENOUS at 08:52

## 2022-01-01 RX ADMIN — IPRATROPIUM BROMIDE AND ALBUTEROL SULFATE 1 AMPULE: .5; 3 SOLUTION RESPIRATORY (INHALATION) at 19:32

## 2022-01-01 RX ADMIN — METHYLPREDNISOLONE SODIUM SUCCINATE 40 MG: 40 INJECTION, POWDER, FOR SOLUTION INTRAMUSCULAR; INTRAVENOUS at 11:53

## 2022-01-01 RX ADMIN — MORPHINE SULFATE 2 MG: 2 INJECTION, SOLUTION INTRAMUSCULAR; INTRAVENOUS at 16:26

## 2022-01-01 RX ADMIN — METHYLPREDNISOLONE SODIUM SUCCINATE 40 MG: 40 INJECTION, POWDER, FOR SOLUTION INTRAMUSCULAR; INTRAVENOUS at 00:40

## 2022-01-01 RX ADMIN — ONDANSETRON 4 MG: 2 INJECTION INTRAMUSCULAR; INTRAVENOUS at 21:30

## 2022-01-01 RX ADMIN — CLOPIDOGREL BISULFATE 75 MG: 75 TABLET ORAL at 13:21

## 2022-01-01 RX ADMIN — DEXMEDETOMIDINE 0.6 MCG/KG/HR: 100 INJECTION, SOLUTION INTRAVENOUS at 11:54

## 2022-01-01 RX ADMIN — METOPROLOL TARTRATE 5 MG: 5 INJECTION, SOLUTION INTRAVENOUS at 22:30

## 2022-01-01 RX ADMIN — ESCITALOPRAM OXALATE 10 MG: 10 TABLET ORAL at 13:20

## 2022-01-01 RX ADMIN — METHIMAZOLE 5 MG: 5 TABLET ORAL at 13:21

## 2022-01-01 RX ADMIN — FUROSEMIDE 20 MG: 10 INJECTION, SOLUTION INTRAMUSCULAR; INTRAVENOUS at 06:30

## 2022-01-01 RX ADMIN — AZITHROMYCIN MONOHYDRATE 500 MG: 500 INJECTION, POWDER, LYOPHILIZED, FOR SOLUTION INTRAVENOUS at 12:00

## 2022-01-01 RX ADMIN — SODIUM CHLORIDE, PRESERVATIVE FREE 10 ML: 5 INJECTION INTRAVENOUS at 08:22

## 2022-01-01 RX ADMIN — METHYLPREDNISOLONE SODIUM SUCCINATE 40 MG: 40 INJECTION, POWDER, FOR SOLUTION INTRAMUSCULAR; INTRAVENOUS at 06:02

## 2022-01-01 RX ADMIN — MORPHINE SULFATE 2 MG: 2 INJECTION, SOLUTION INTRAMUSCULAR; INTRAVENOUS at 14:17

## 2022-01-01 RX ADMIN — LORAZEPAM 2 MG: 2 INJECTION INTRAMUSCULAR; INTRAVENOUS at 18:15

## 2022-01-01 RX ADMIN — MORPHINE SULFATE 2 MG: 2 INJECTION, SOLUTION INTRAMUSCULAR; INTRAVENOUS at 18:15

## 2022-01-01 RX ADMIN — CEFTRIAXONE SODIUM 1000 MG: 1 INJECTION, POWDER, FOR SOLUTION INTRAMUSCULAR; INTRAVENOUS at 08:11

## 2022-01-01 RX ADMIN — ASPIRIN 81 MG: 81 TABLET, CHEWABLE ORAL at 13:21

## 2022-01-01 RX ADMIN — MORPHINE SULFATE 2 MG: 2 INJECTION, SOLUTION INTRAMUSCULAR; INTRAVENOUS at 09:07

## 2022-01-01 RX ADMIN — LORAZEPAM 2 MG: 2 INJECTION INTRAMUSCULAR; INTRAVENOUS at 19:19

## 2022-01-01 RX ADMIN — METOPROLOL SUCCINATE 100 MG: 50 TABLET, EXTENDED RELEASE ORAL at 13:21

## 2022-01-01 RX ADMIN — MORPHINE SULFATE 2 MG: 2 INJECTION, SOLUTION INTRAMUSCULAR; INTRAVENOUS at 12:04

## 2022-01-01 RX ADMIN — MORPHINE SULFATE 2 MG: 2 INJECTION, SOLUTION INTRAMUSCULAR; INTRAVENOUS at 03:10

## 2022-01-01 RX ADMIN — IPRATROPIUM BROMIDE AND ALBUTEROL SULFATE 1 AMPULE: .5; 3 SOLUTION RESPIRATORY (INHALATION) at 07:54

## 2022-01-01 RX ADMIN — SODIUM CHLORIDE 500 ML: 9 INJECTION, SOLUTION INTRAVENOUS at 22:52

## 2022-01-01 RX ADMIN — LORAZEPAM 2 MG: 2 INJECTION INTRAMUSCULAR; INTRAVENOUS at 22:30

## 2022-01-01 RX ADMIN — IPRATROPIUM BROMIDE AND ALBUTEROL SULFATE 1 AMPULE: .5; 3 SOLUTION RESPIRATORY (INHALATION) at 12:03

## 2022-01-01 RX ADMIN — MORPHINE SULFATE 2 MG: 2 INJECTION, SOLUTION INTRAMUSCULAR; INTRAVENOUS at 06:40

## 2022-01-01 RX ADMIN — METHYLPREDNISOLONE SODIUM SUCCINATE 40 MG: 40 INJECTION, POWDER, FOR SOLUTION INTRAMUSCULAR; INTRAVENOUS at 13:19

## 2022-01-01 RX ADMIN — IPRATROPIUM BROMIDE AND ALBUTEROL SULFATE 1 AMPULE: .5; 3 SOLUTION RESPIRATORY (INHALATION) at 15:09

## 2022-01-01 RX ADMIN — IPRATROPIUM BROMIDE AND ALBUTEROL SULFATE 1 AMPULE: 2.5; .5 SOLUTION RESPIRATORY (INHALATION) at 03:05

## 2022-01-01 RX ADMIN — ENOXAPARIN SODIUM 40 MG: 100 INJECTION SUBCUTANEOUS at 13:22

## 2022-01-01 RX ADMIN — IPRATROPIUM BROMIDE AND ALBUTEROL SULFATE 1 AMPULE: .5; 3 SOLUTION RESPIRATORY (INHALATION) at 15:15

## 2022-01-01 RX ADMIN — ONDANSETRON 4 MG: 2 INJECTION INTRAMUSCULAR; INTRAVENOUS at 16:24

## 2022-01-01 RX ADMIN — METHYLPREDNISOLONE SODIUM SUCCINATE 40 MG: 40 INJECTION, POWDER, FOR SOLUTION INTRAMUSCULAR; INTRAVENOUS at 18:25

## 2022-01-01 RX ADMIN — NOREPINEPHRINE BITARTRATE 5 MCG/MIN: 1 SOLUTION INTRAVENOUS at 02:05

## 2022-01-01 RX ADMIN — LORAZEPAM 2 MG: 2 INJECTION INTRAMUSCULAR; INTRAVENOUS at 18:36

## 2022-01-01 RX ADMIN — MORPHINE SULFATE 2 MG: 2 INJECTION, SOLUTION INTRAMUSCULAR; INTRAVENOUS at 18:35

## 2022-01-01 RX ADMIN — MORPHINE SULFATE 4 MG: 4 INJECTION INTRAVENOUS at 04:43

## 2022-01-01 RX ADMIN — PIPERACILLIN AND TAZOBACTAM 4500 MG: 4; .5 INJECTION, POWDER, LYOPHILIZED, FOR SOLUTION INTRAVENOUS at 04:45

## 2022-01-01 RX ADMIN — DEXMEDETOMIDINE 0.2 MCG/KG/HR: 100 INJECTION, SOLUTION INTRAVENOUS at 01:16

## 2022-01-01 RX ADMIN — MORPHINE SULFATE 2 MG: 2 INJECTION, SOLUTION INTRAMUSCULAR; INTRAVENOUS at 22:30

## 2022-01-01 RX ADMIN — LOSARTAN POTASSIUM 25 MG: 50 TABLET, FILM COATED ORAL at 13:21

## 2022-01-01 RX ADMIN — ENOXAPARIN SODIUM 40 MG: 100 INJECTION SUBCUTANEOUS at 14:30

## 2022-01-01 RX ADMIN — SODIUM CHLORIDE, PRESERVATIVE FREE 10 ML: 5 INJECTION INTRAVENOUS at 21:52

## 2022-01-01 RX ADMIN — MORPHINE SULFATE 2 MG: 2 INJECTION, SOLUTION INTRAMUSCULAR; INTRAVENOUS at 09:55

## 2022-01-01 ASSESSMENT — ENCOUNTER SYMPTOMS
EYE REDNESS: 0
SORE THROAT: 0
EYE DISCHARGE: 0
TROUBLE SWALLOWING: 0
SPUTUM PRODUCTION: 1
BACK PAIN: 0
NAUSEA: 0
ABDOMINAL PAIN: 0
SINUS PAIN: 0
COUGH: 0
RHINORRHEA: 0
SHORTNESS OF BREATH: 1
EYE PAIN: 0
VOMITING: 0
COLOR CHANGE: 0
CHEST TIGHTNESS: 1
SINUS PRESSURE: 0

## 2022-01-01 ASSESSMENT — PAIN DESCRIPTION - LOCATION
LOCATION: BACK;GROIN
LOCATION: BACK
LOCATION: BACK;GROIN
LOCATION: BACK

## 2022-01-01 ASSESSMENT — PAIN SCALES - GENERAL
PAINLEVEL_OUTOF10: 10
PAINLEVEL_OUTOF10: 0
PAINLEVEL_OUTOF10: 6
PAINLEVEL_OUTOF10: 7
PAINLEVEL_OUTOF10: 7
PAINLEVEL_OUTOF10: 1

## 2022-01-01 ASSESSMENT — PAIN DESCRIPTION - DESCRIPTORS
DESCRIPTORS: BURNING;SHARP
DESCRIPTORS: ACHING
DESCRIPTORS: ACHING;BURNING
DESCRIPTORS: ACHING

## 2022-01-01 ASSESSMENT — PAIN - FUNCTIONAL ASSESSMENT: PAIN_FUNCTIONAL_ASSESSMENT: NONE - DENIES PAIN

## 2022-01-26 ENCOUNTER — TRANSCRIBE ORDER (OUTPATIENT)
Dept: SCHEDULING | Age: 72
End: 2022-01-26

## 2022-01-26 DIAGNOSIS — M46.1 SACROILIAC INFLAMMATION (HCC): ICD-10-CM

## 2022-01-26 DIAGNOSIS — G89.29 CHRONIC LOW BACK PAIN WITH SCIATICA, SCIATICA LATERALITY UNSPECIFIED, UNSPECIFIED BACK PAIN LATERALITY: Primary | ICD-10-CM

## 2022-01-26 DIAGNOSIS — M54.40 CHRONIC LOW BACK PAIN WITH SCIATICA, SCIATICA LATERALITY UNSPECIFIED, UNSPECIFIED BACK PAIN LATERALITY: Primary | ICD-10-CM

## 2022-01-27 ENCOUNTER — HOSPITAL ENCOUNTER (OUTPATIENT)
Dept: MRI IMAGING | Age: 72
Discharge: HOME | End: 2022-01-27
Attending: FAMILY MEDICINE
Payer: MEDICARE

## 2022-01-27 DIAGNOSIS — M46.1 SACROILIAC INFLAMMATION (HCC): ICD-10-CM

## 2022-01-27 DIAGNOSIS — M54.40 CHRONIC LOW BACK PAIN WITH SCIATICA, SCIATICA LATERALITY UNSPECIFIED, UNSPECIFIED BACK PAIN LATERALITY: ICD-10-CM

## 2022-01-27 DIAGNOSIS — G89.29 CHRONIC LOW BACK PAIN WITH SCIATICA, SCIATICA LATERALITY UNSPECIFIED, UNSPECIFIED BACK PAIN LATERALITY: ICD-10-CM

## 2022-01-27 PROCEDURE — 72148 MRI LUMBAR SPINE W/O DYE: CPT

## 2022-02-03 ENCOUNTER — HOSPITAL ENCOUNTER (INPATIENT)
Age: 72
LOS: 6 days | Discharge: NURSING, REHAB OR LONG-TERM CARE | DRG: 480 | End: 2022-02-10
Attending: EMERGENCY MEDICINE | Admitting: HOSPITALIST
Payer: MEDICARE

## 2022-02-03 ENCOUNTER — APPOINTMENT (OUTPATIENT)
Dept: GENERAL RADIOLOGY | Age: 72
DRG: 480 | End: 2022-02-03
Attending: EMERGENCY MEDICINE
Payer: MEDICARE

## 2022-02-03 DIAGNOSIS — Z87.891 PERSONAL HISTORY OF TOBACCO USE: ICD-10-CM

## 2022-02-03 DIAGNOSIS — R91.8 PULMONARY MASS: ICD-10-CM

## 2022-02-03 DIAGNOSIS — Z66 DNR (DO NOT RESUSCITATE): ICD-10-CM

## 2022-02-03 DIAGNOSIS — I25.10 CAD S/P PERCUTANEOUS CORONARY ANGIOPLASTY: ICD-10-CM

## 2022-02-03 DIAGNOSIS — I10 ESSENTIAL HYPERTENSION, BENIGN: ICD-10-CM

## 2022-02-03 DIAGNOSIS — J44.9 COPD, VERY SEVERE (HCC): ICD-10-CM

## 2022-02-03 DIAGNOSIS — E78.2 MIXED HYPERLIPIDEMIA: ICD-10-CM

## 2022-02-03 DIAGNOSIS — R07.9 CHEST PAIN, UNSPECIFIED TYPE: ICD-10-CM

## 2022-02-03 DIAGNOSIS — E78.5 HYPERLIPIDEMIA, UNSPECIFIED HYPERLIPIDEMIA TYPE: ICD-10-CM

## 2022-02-03 DIAGNOSIS — D50.0 IRON DEFICIENCY ANEMIA DUE TO CHRONIC BLOOD LOSS: Chronic | ICD-10-CM

## 2022-02-03 DIAGNOSIS — J96.11 CHRONIC RESPIRATORY FAILURE WITH HYPOXIA (HCC): ICD-10-CM

## 2022-02-03 DIAGNOSIS — J96.01 ACUTE RESPIRATORY FAILURE WITH HYPOXIA (HCC): ICD-10-CM

## 2022-02-03 DIAGNOSIS — Z98.61 CAD S/P PERCUTANEOUS CORONARY ANGIOPLASTY: ICD-10-CM

## 2022-02-03 DIAGNOSIS — I34.0 NONRHEUMATIC MITRAL VALVE REGURGITATION: Chronic | ICD-10-CM

## 2022-02-03 DIAGNOSIS — S72.001A CLOSED FRACTURE OF RIGHT HIP, INITIAL ENCOUNTER (HCC): Primary | ICD-10-CM

## 2022-02-03 DIAGNOSIS — M54.50 ACUTE BILATERAL LOW BACK PAIN WITHOUT SCIATICA: ICD-10-CM

## 2022-02-03 LAB
ERYTHROCYTE [DISTWIDTH] IN BLOOD BY AUTOMATED COUNT: 14.1 % (ref 11.9–14.6)
HCT VFR BLD AUTO: 35.8 % (ref 35.8–46.3)
HGB BLD-MCNC: 11.5 G/DL (ref 11.7–15.4)
MCH RBC QN AUTO: 29.6 PG (ref 26.1–32.9)
MCHC RBC AUTO-ENTMCNC: 32.1 G/DL (ref 31.4–35)
MCV RBC AUTO: 92.3 FL (ref 79.6–97.8)
NRBC # BLD: 0 K/UL (ref 0–0.2)
PLATELET # BLD AUTO: 221 K/UL (ref 150–450)
PMV BLD AUTO: 9.7 FL (ref 9.4–12.3)
RBC # BLD AUTO: 3.88 M/UL (ref 4.05–5.2)
WBC # BLD AUTO: 7.2 K/UL (ref 4.3–11.1)

## 2022-02-03 PROCEDURE — 4A023N7 MEASUREMENT OF CARDIAC SAMPLING AND PRESSURE, LEFT HEART, PERCUTANEOUS APPROACH: ICD-10-PCS | Performed by: INTERNAL MEDICINE

## 2022-02-03 PROCEDURE — 73502 X-RAY EXAM HIP UNI 2-3 VIEWS: CPT

## 2022-02-03 PROCEDURE — 80053 COMPREHEN METABOLIC PANEL: CPT

## 2022-02-03 PROCEDURE — 71045 X-RAY EXAM CHEST 1 VIEW: CPT

## 2022-02-03 PROCEDURE — 0QS606Z REPOSITION RIGHT UPPER FEMUR WITH INTRAMEDULLARY INTERNAL FIXATION DEVICE, OPEN APPROACH: ICD-10-PCS | Performed by: ORTHOPAEDIC SURGERY

## 2022-02-03 PROCEDURE — 85027 COMPLETE CBC AUTOMATED: CPT

## 2022-02-03 PROCEDURE — 99285 EMERGENCY DEPT VISIT HI MDM: CPT

## 2022-02-03 PROCEDURE — 73552 X-RAY EXAM OF FEMUR 2/>: CPT

## 2022-02-03 PROCEDURE — B2111ZZ FLUOROSCOPY OF MULTIPLE CORONARY ARTERIES USING LOW OSMOLAR CONTRAST: ICD-10-PCS | Performed by: INTERNAL MEDICINE

## 2022-02-03 PROCEDURE — 84484 ASSAY OF TROPONIN QUANT: CPT

## 2022-02-03 PROCEDURE — 87635 SARS-COV-2 COVID-19 AMP PRB: CPT

## 2022-02-03 PROCEDURE — 027035Z DILATION OF CORONARY ARTERY, ONE ARTERY WITH TWO DRUG-ELUTING INTRALUMINAL DEVICES, PERCUTANEOUS APPROACH: ICD-10-PCS | Performed by: INTERNAL MEDICINE

## 2022-02-03 PROCEDURE — 93005 ELECTROCARDIOGRAM TRACING: CPT | Performed by: EMERGENCY MEDICINE

## 2022-02-03 PROCEDURE — B2151ZZ FLUOROSCOPY OF LEFT HEART USING LOW OSMOLAR CONTRAST: ICD-10-PCS | Performed by: INTERNAL MEDICINE

## 2022-02-03 PROCEDURE — 02703ZZ DILATION OF CORONARY ARTERY, ONE ARTERY, PERCUTANEOUS APPROACH: ICD-10-PCS | Performed by: INTERNAL MEDICINE

## 2022-02-03 RX ORDER — ONDANSETRON 2 MG/ML
4 INJECTION INTRAMUSCULAR; INTRAVENOUS
Status: DISCONTINUED | OUTPATIENT
Start: 2022-02-03 | End: 2022-02-10 | Stop reason: HOSPADM

## 2022-02-03 NOTE — Clinical Note
TRANSFER - OUT REPORT:     Verbal report given to: CPRU RN. Report consisted of patient's Situation, Background, Assessment and   Recommendations(SBAR). Opportunity for questions and clarification was provided. Patient transported to: recovery.

## 2022-02-03 NOTE — Clinical Note
Balloon catheter removed. Balloon/Stent status: undeployed. Suspected rupture, unsuccessful angioplasty.

## 2022-02-04 ENCOUNTER — APPOINTMENT (OUTPATIENT)
Dept: GENERAL RADIOLOGY | Age: 72
DRG: 480 | End: 2022-02-04
Attending: STUDENT IN AN ORGANIZED HEALTH CARE EDUCATION/TRAINING PROGRAM
Payer: MEDICARE

## 2022-02-04 ENCOUNTER — ANESTHESIA EVENT (OUTPATIENT)
Dept: SURGERY | Age: 72
DRG: 480 | End: 2022-02-04
Payer: MEDICARE

## 2022-02-04 ENCOUNTER — ANESTHESIA (OUTPATIENT)
Dept: SURGERY | Age: 72
DRG: 480 | End: 2022-02-04
Payer: MEDICARE

## 2022-02-04 ENCOUNTER — APPOINTMENT (OUTPATIENT)
Dept: GENERAL RADIOLOGY | Age: 72
DRG: 480 | End: 2022-02-04
Attending: ORTHOPAEDIC SURGERY
Payer: MEDICARE

## 2022-02-04 PROBLEM — S72.001A CLOSED RIGHT HIP FRACTURE (HCC): Status: ACTIVE | Noted: 2022-02-04

## 2022-02-04 PROBLEM — S72.8X1A OTHER FRACTURE OF RIGHT FEMUR, INITIAL ENCOUNTER FOR CLOSED FRACTURE (HCC): Status: ACTIVE | Noted: 2022-02-04

## 2022-02-04 LAB
ABO + RH BLD: NORMAL
ALBUMIN SERPL-MCNC: 3.2 G/DL (ref 3.2–4.6)
ALBUMIN/GLOB SERPL: 0.9 {RATIO} (ref 1.2–3.5)
ALP SERPL-CCNC: 79 U/L (ref 50–136)
ALT SERPL-CCNC: 26 U/L (ref 12–65)
ANION GAP SERPL CALC-SCNC: 7 MMOL/L (ref 7–16)
AST SERPL-CCNC: 29 U/L (ref 15–37)
ATRIAL RATE: 67 BPM
BACTERIA SPEC CULT: NORMAL
BILIRUB SERPL-MCNC: 0.3 MG/DL (ref 0.2–1.1)
BLOOD GROUP ANTIBODIES SERPL: NORMAL
BUN SERPL-MCNC: 15 MG/DL (ref 8–23)
CALCIUM SERPL-MCNC: 8.9 MG/DL (ref 8.3–10.4)
CALCULATED P AXIS, ECG09: 86 DEGREES
CALCULATED R AXIS, ECG10: 75 DEGREES
CHLORIDE SERPL-SCNC: 105 MMOL/L (ref 98–107)
CO2 SERPL-SCNC: 28 MMOL/L (ref 21–32)
COVID-19 RAPID TEST, COVR: NOT DETECTED
CREAT SERPL-MCNC: 0.9 MG/DL (ref 0.6–1)
DIAGNOSIS, 93000: NORMAL
GLOBULIN SER CALC-MCNC: 3.4 G/DL (ref 2.3–3.5)
GLUCOSE SERPL-MCNC: 97 MG/DL (ref 65–100)
HGB BLD-MCNC: 9.7 G/DL (ref 11.7–15.4)
P-R INTERVAL, ECG05: 172 MS
POTASSIUM SERPL-SCNC: 3.6 MMOL/L (ref 3.5–5.1)
PROT SERPL-MCNC: 6.6 G/DL (ref 6.3–8.2)
Q-T INTERVAL, ECG07: 383 MS
QRS DURATION, ECG06: 91 MS
QTC CALCULATION (BEZET), ECG08: 405 MS
SERVICE CMNT-IMP: NORMAL
SODIUM SERPL-SCNC: 140 MMOL/L (ref 136–145)
SOURCE, COVRS: NORMAL
SPECIMEN EXP DATE BLD: NORMAL
TROPONIN-HIGH SENSITIVITY: 22.2 PG/ML (ref 0–14)
TROPONIN-HIGH SENSITIVITY: 42.3 PG/ML (ref 0–14)
VENTRICULAR RATE, ECG03: 67 BPM

## 2022-02-04 PROCEDURE — 77030021107 HC SHFT RMR MOD DISP STRY -D: Performed by: ORTHOPAEDIC SURGERY

## 2022-02-04 PROCEDURE — 77030040830 HC CATH URETH FOL MDII -A

## 2022-02-04 PROCEDURE — 77030002933 HC SUT MCRYL J&J -A: Performed by: ORTHOPAEDIC SURGERY

## 2022-02-04 PROCEDURE — 86580 TB INTRADERMAL TEST: CPT | Performed by: HOSPITALIST

## 2022-02-04 PROCEDURE — 2709999900 HC NON-CHARGEABLE SUPPLY

## 2022-02-04 PROCEDURE — 74011250636 HC RX REV CODE- 250/636: Performed by: ORTHOPAEDIC SURGERY

## 2022-02-04 PROCEDURE — 27245 TREAT THIGH FRACTURE: CPT | Performed by: ORTHOPAEDIC SURGERY

## 2022-02-04 PROCEDURE — 74011000250 HC RX REV CODE- 250: Performed by: ORTHOPAEDIC SURGERY

## 2022-02-04 PROCEDURE — 86900 BLOOD TYPING SEROLOGIC ABO: CPT

## 2022-02-04 PROCEDURE — 73552 X-RAY EXAM OF FEMUR 2/>: CPT

## 2022-02-04 PROCEDURE — 36415 COLL VENOUS BLD VENIPUNCTURE: CPT

## 2022-02-04 PROCEDURE — 74011636637 HC RX REV CODE- 636/637: Performed by: HOSPITALIST

## 2022-02-04 PROCEDURE — 77030018673: Performed by: ORTHOPAEDIC SURGERY

## 2022-02-04 PROCEDURE — 74011250636 HC RX REV CODE- 250/636: Performed by: HOSPITALIST

## 2022-02-04 PROCEDURE — 77030008846 HC WRE K STRY -C: Performed by: ORTHOPAEDIC SURGERY

## 2022-02-04 PROCEDURE — 74011250637 HC RX REV CODE- 250/637: Performed by: ORTHOPAEDIC SURGERY

## 2022-02-04 PROCEDURE — C1713 ANCHOR/SCREW BN/BN,TIS/BN: HCPCS | Performed by: ORTHOPAEDIC SURGERY

## 2022-02-04 PROCEDURE — 76210000006 HC OR PH I REC 0.5 TO 1 HR: Performed by: ORTHOPAEDIC SURGERY

## 2022-02-04 PROCEDURE — 77030040922 HC BLNKT HYPOTHRM STRY -A: Performed by: ANESTHESIOLOGY

## 2022-02-04 PROCEDURE — 94640 AIRWAY INHALATION TREATMENT: CPT

## 2022-02-04 PROCEDURE — 94760 N-INVAS EAR/PLS OXIMETRY 1: CPT

## 2022-02-04 PROCEDURE — 74011250636 HC RX REV CODE- 250/636: Performed by: ANESTHESIOLOGY

## 2022-02-04 PROCEDURE — 77030010509 HC AIRWY LMA MSK TELE -A: Performed by: ANESTHESIOLOGY

## 2022-02-04 PROCEDURE — 87641 MR-STAPH DNA AMP PROBE: CPT

## 2022-02-04 PROCEDURE — 74011250637 HC RX REV CODE- 250/637: Performed by: HOSPITALIST

## 2022-02-04 PROCEDURE — 76060000033 HC ANESTHESIA 1 TO 1.5 HR: Performed by: ORTHOPAEDIC SURGERY

## 2022-02-04 PROCEDURE — 74011250636 HC RX REV CODE- 250/636: Performed by: NURSE ANESTHETIST, CERTIFIED REGISTERED

## 2022-02-04 PROCEDURE — 77030017016 HC DSG ANTIMIC BARR2 S&N -B: Performed by: ORTHOPAEDIC SURGERY

## 2022-02-04 PROCEDURE — 77030027138 HC INCENT SPIROMETER -A

## 2022-02-04 PROCEDURE — 77030036696 HC BOOT TRACT BUCKS S2SG -A

## 2022-02-04 PROCEDURE — 74011250636 HC RX REV CODE- 250/636: Performed by: EMERGENCY MEDICINE

## 2022-02-04 PROCEDURE — 77030019908 HC STETH ESOPH SIMS -A: Performed by: ANESTHESIOLOGY

## 2022-02-04 PROCEDURE — 74011250637 HC RX REV CODE- 250/637: Performed by: PHYSICIAN ASSISTANT

## 2022-02-04 PROCEDURE — 76010000161 HC OR TIME 1 TO 1.5 HR INTENSV-TIER 1: Performed by: ORTHOPAEDIC SURGERY

## 2022-02-04 PROCEDURE — 94664 DEMO&/EVAL PT USE INHALER: CPT

## 2022-02-04 PROCEDURE — 85018 HEMOGLOBIN: CPT

## 2022-02-04 PROCEDURE — 77010033678 HC OXYGEN DAILY

## 2022-02-04 PROCEDURE — 65270000029 HC RM PRIVATE

## 2022-02-04 PROCEDURE — 74011000250 HC RX REV CODE- 250: Performed by: ANESTHESIOLOGY

## 2022-02-04 PROCEDURE — 72170 X-RAY EXAM OF PELVIS: CPT

## 2022-02-04 PROCEDURE — 77030040361 HC SLV COMPR DVT MDII -B

## 2022-02-04 PROCEDURE — 99222 1ST HOSP IP/OBS MODERATE 55: CPT | Performed by: ORTHOPAEDIC SURGERY

## 2022-02-04 PROCEDURE — 74011000250 HC RX REV CODE- 250: Performed by: HOSPITALIST

## 2022-02-04 PROCEDURE — C1769 GUIDE WIRE: HCPCS | Performed by: ORTHOPAEDIC SURGERY

## 2022-02-04 PROCEDURE — 2709999900 HC NON-CHARGEABLE SUPPLY: Performed by: ORTHOPAEDIC SURGERY

## 2022-02-04 DEVICE — TROCHANTERIC NAIL KIT, TI
Type: IMPLANTABLE DEVICE | Site: HIP | Status: FUNCTIONAL
Brand: GAMMA

## 2022-02-04 DEVICE — LOCKING SCREW, FULLY THREADED: Type: IMPLANTABLE DEVICE | Site: HIP | Status: FUNCTIONAL

## 2022-02-04 DEVICE — LAG SCREW, TI
Type: IMPLANTABLE DEVICE | Site: HIP | Status: FUNCTIONAL
Brand: GAMMA

## 2022-02-04 RX ORDER — SODIUM CHLORIDE, SODIUM LACTATE, POTASSIUM CHLORIDE, CALCIUM CHLORIDE 600; 310; 30; 20 MG/100ML; MG/100ML; MG/100ML; MG/100ML
100 INJECTION, SOLUTION INTRAVENOUS CONTINUOUS
Status: DISCONTINUED | OUTPATIENT
Start: 2022-02-04 | End: 2022-02-04 | Stop reason: HOSPADM

## 2022-02-04 RX ORDER — FENTANYL CITRATE 50 UG/ML
100 INJECTION, SOLUTION INTRAMUSCULAR; INTRAVENOUS ONCE
Status: DISCONTINUED | OUTPATIENT
Start: 2022-02-04 | End: 2022-02-04 | Stop reason: HOSPADM

## 2022-02-04 RX ORDER — SODIUM CHLORIDE 0.9 % (FLUSH) 0.9 %
5-40 SYRINGE (ML) INJECTION AS NEEDED
Status: DISCONTINUED | OUTPATIENT
Start: 2022-02-04 | End: 2022-02-10 | Stop reason: HOSPADM

## 2022-02-04 RX ORDER — HYDROMORPHONE HYDROCHLORIDE 1 MG/ML
0.5 INJECTION, SOLUTION INTRAMUSCULAR; INTRAVENOUS; SUBCUTANEOUS
Status: DISCONTINUED | OUTPATIENT
Start: 2022-02-04 | End: 2022-02-04 | Stop reason: HOSPADM

## 2022-02-04 RX ORDER — CEFAZOLIN SODIUM/WATER 2 G/20 ML
2 SYRINGE (ML) INTRAVENOUS ONCE
Status: DISCONTINUED | OUTPATIENT
Start: 2022-02-04 | End: 2022-02-04 | Stop reason: HOSPADM

## 2022-02-04 RX ORDER — MIDAZOLAM HYDROCHLORIDE 1 MG/ML
2 INJECTION, SOLUTION INTRAMUSCULAR; INTRAVENOUS ONCE
Status: DISCONTINUED | OUTPATIENT
Start: 2022-02-04 | End: 2022-02-04 | Stop reason: HOSPADM

## 2022-02-04 RX ORDER — NALOXONE HYDROCHLORIDE 0.4 MG/ML
0.1 INJECTION, SOLUTION INTRAMUSCULAR; INTRAVENOUS; SUBCUTANEOUS AS NEEDED
Status: DISCONTINUED | OUTPATIENT
Start: 2022-02-04 | End: 2022-02-04 | Stop reason: HOSPADM

## 2022-02-04 RX ORDER — PREDNISONE 10 MG/1
10 TABLET ORAL
Status: DISCONTINUED | OUTPATIENT
Start: 2022-02-04 | End: 2022-02-07

## 2022-02-04 RX ORDER — ONDANSETRON 2 MG/ML
4 INJECTION INTRAMUSCULAR; INTRAVENOUS ONCE
Status: DISCONTINUED | OUTPATIENT
Start: 2022-02-04 | End: 2022-02-04 | Stop reason: HOSPADM

## 2022-02-04 RX ORDER — SODIUM CHLORIDE 0.9 % (FLUSH) 0.9 %
5-40 SYRINGE (ML) INJECTION AS NEEDED
Status: DISCONTINUED | OUTPATIENT
Start: 2022-02-04 | End: 2022-02-04 | Stop reason: HOSPADM

## 2022-02-04 RX ORDER — BUDESONIDE AND FORMOTEROL FUMARATE DIHYDRATE 160; 4.5 UG/1; UG/1
2 AEROSOL RESPIRATORY (INHALATION)
Status: DISCONTINUED | OUTPATIENT
Start: 2022-02-04 | End: 2022-02-10 | Stop reason: HOSPADM

## 2022-02-04 RX ORDER — ALBUTEROL SULFATE 0.83 MG/ML
2.5 SOLUTION RESPIRATORY (INHALATION) AS NEEDED
Status: DISCONTINUED | OUTPATIENT
Start: 2022-02-04 | End: 2022-02-04 | Stop reason: HOSPADM

## 2022-02-04 RX ORDER — SODIUM CHLORIDE 0.9 % (FLUSH) 0.9 %
5-40 SYRINGE (ML) INJECTION EVERY 8 HOURS
Status: DISCONTINUED | OUTPATIENT
Start: 2022-02-04 | End: 2022-02-04 | Stop reason: HOSPADM

## 2022-02-04 RX ORDER — FENTANYL CITRATE 50 UG/ML
INJECTION, SOLUTION INTRAMUSCULAR; INTRAVENOUS AS NEEDED
Status: DISCONTINUED | OUTPATIENT
Start: 2022-02-04 | End: 2022-02-04 | Stop reason: HOSPADM

## 2022-02-04 RX ORDER — METOPROLOL SUCCINATE 100 MG/1
100 TABLET, EXTENDED RELEASE ORAL DAILY
Status: DISCONTINUED | OUTPATIENT
Start: 2022-02-04 | End: 2022-02-07

## 2022-02-04 RX ORDER — NALOXONE HYDROCHLORIDE 0.4 MG/ML
.2-.4 INJECTION, SOLUTION INTRAMUSCULAR; INTRAVENOUS; SUBCUTANEOUS
Status: DISCONTINUED | OUTPATIENT
Start: 2022-02-04 | End: 2022-02-10 | Stop reason: HOSPADM

## 2022-02-04 RX ORDER — OXYCODONE HYDROCHLORIDE 5 MG/1
10 TABLET ORAL
Status: DISCONTINUED | OUTPATIENT
Start: 2022-02-04 | End: 2022-02-09

## 2022-02-04 RX ORDER — SODIUM CHLORIDE 0.9 % (FLUSH) 0.9 %
5-40 SYRINGE (ML) INJECTION EVERY 8 HOURS
Status: DISCONTINUED | OUTPATIENT
Start: 2022-02-04 | End: 2022-02-07

## 2022-02-04 RX ORDER — ACETAMINOPHEN 325 MG/1
975 TABLET ORAL ONCE
Status: COMPLETED | OUTPATIENT
Start: 2022-02-04 | End: 2022-02-04

## 2022-02-04 RX ORDER — LIDOCAINE HYDROCHLORIDE 20 MG/ML
INJECTION, SOLUTION EPIDURAL; INFILTRATION; INTRACAUDAL; PERINEURAL AS NEEDED
Status: DISCONTINUED | OUTPATIENT
Start: 2022-02-04 | End: 2022-02-04 | Stop reason: HOSPADM

## 2022-02-04 RX ORDER — TORSEMIDE 20 MG/1
10 TABLET ORAL DAILY
Status: DISCONTINUED | OUTPATIENT
Start: 2022-02-04 | End: 2022-02-06

## 2022-02-04 RX ORDER — OXYCODONE HYDROCHLORIDE 5 MG/1
10 TABLET ORAL
Status: DISCONTINUED | OUTPATIENT
Start: 2022-02-04 | End: 2022-02-04 | Stop reason: HOSPADM

## 2022-02-04 RX ORDER — DIPHENHYDRAMINE HYDROCHLORIDE 50 MG/ML
12.5 INJECTION, SOLUTION INTRAMUSCULAR; INTRAVENOUS
Status: DISCONTINUED | OUTPATIENT
Start: 2022-02-04 | End: 2022-02-04 | Stop reason: HOSPADM

## 2022-02-04 RX ORDER — PANTOPRAZOLE SODIUM 40 MG/1
40 TABLET, DELAYED RELEASE ORAL 2 TIMES DAILY
Status: DISCONTINUED | OUTPATIENT
Start: 2022-02-04 | End: 2022-02-10 | Stop reason: HOSPADM

## 2022-02-04 RX ORDER — DEXAMETHASONE SODIUM PHOSPHATE 100 MG/10ML
10 INJECTION INTRAMUSCULAR; INTRAVENOUS ONCE
Status: COMPLETED | OUTPATIENT
Start: 2022-02-05 | End: 2022-02-05

## 2022-02-04 RX ORDER — SODIUM CHLORIDE 9 MG/ML
75 INJECTION, SOLUTION INTRAVENOUS CONTINUOUS
Status: DISCONTINUED | OUTPATIENT
Start: 2022-02-04 | End: 2022-02-04 | Stop reason: HOSPADM

## 2022-02-04 RX ORDER — HYDROMORPHONE HYDROCHLORIDE 2 MG/ML
0.5 INJECTION, SOLUTION INTRAMUSCULAR; INTRAVENOUS; SUBCUTANEOUS
Status: DISCONTINUED | OUTPATIENT
Start: 2022-02-04 | End: 2022-02-04 | Stop reason: HOSPADM

## 2022-02-04 RX ORDER — AMOXICILLIN 250 MG
2 CAPSULE ORAL DAILY
Status: DISCONTINUED | OUTPATIENT
Start: 2022-02-05 | End: 2022-02-10 | Stop reason: HOSPADM

## 2022-02-04 RX ORDER — ACETAMINOPHEN 650 MG/1
650 SUPPOSITORY RECTAL ONCE
Status: COMPLETED | OUTPATIENT
Start: 2022-02-04 | End: 2022-02-04

## 2022-02-04 RX ORDER — ACETAMINOPHEN 500 MG
1000 TABLET ORAL EVERY 6 HOURS
Status: DISCONTINUED | OUTPATIENT
Start: 2022-02-05 | End: 2022-02-07

## 2022-02-04 RX ORDER — LIDOCAINE HYDROCHLORIDE 10 MG/ML
0.1 INJECTION INFILTRATION; PERINEURAL AS NEEDED
Status: DISCONTINUED | OUTPATIENT
Start: 2022-02-04 | End: 2022-02-04 | Stop reason: HOSPADM

## 2022-02-04 RX ORDER — LISINOPRIL 5 MG/1
5 TABLET ORAL DAILY
Status: DISCONTINUED | OUTPATIENT
Start: 2022-02-04 | End: 2022-02-07

## 2022-02-04 RX ORDER — ONDANSETRON 2 MG/ML
INJECTION INTRAMUSCULAR; INTRAVENOUS AS NEEDED
Status: DISCONTINUED | OUTPATIENT
Start: 2022-02-04 | End: 2022-02-04 | Stop reason: HOSPADM

## 2022-02-04 RX ORDER — EPHEDRINE SULFATE/0.9% NACL/PF 50 MG/5 ML
SYRINGE (ML) INTRAVENOUS AS NEEDED
Status: DISCONTINUED | OUTPATIENT
Start: 2022-02-04 | End: 2022-02-04 | Stop reason: HOSPADM

## 2022-02-04 RX ORDER — SODIUM CHLORIDE 9 MG/ML
100 INJECTION, SOLUTION INTRAVENOUS CONTINUOUS
Status: DISPENSED | OUTPATIENT
Start: 2022-02-04 | End: 2022-02-06

## 2022-02-04 RX ORDER — ACETAMINOPHEN 325 MG/1
650 TABLET ORAL EVERY 8 HOURS
Status: DISCONTINUED | OUTPATIENT
Start: 2022-02-04 | End: 2022-02-04 | Stop reason: HOSPADM

## 2022-02-04 RX ORDER — MORPHINE SULFATE 4 MG/ML
4 INJECTION INTRAVENOUS ONCE
Status: COMPLETED | OUTPATIENT
Start: 2022-02-04 | End: 2022-02-04

## 2022-02-04 RX ORDER — NITROGLYCERIN 0.4 MG/1
0.4 TABLET SUBLINGUAL
Qty: 60 EACH | Refills: 11 | Status: SHIPPED | OUTPATIENT
Start: 2022-02-04

## 2022-02-04 RX ORDER — MIDAZOLAM HYDROCHLORIDE 1 MG/ML
2 INJECTION, SOLUTION INTRAMUSCULAR; INTRAVENOUS
Status: DISCONTINUED | OUTPATIENT
Start: 2022-02-04 | End: 2022-02-04 | Stop reason: HOSPADM

## 2022-02-04 RX ORDER — CEFAZOLIN SODIUM/WATER 2 G/20 ML
SYRINGE (ML) INTRAVENOUS AS NEEDED
Status: DISCONTINUED | OUTPATIENT
Start: 2022-02-04 | End: 2022-02-04 | Stop reason: HOSPADM

## 2022-02-04 RX ORDER — ASPIRIN 81 MG/1
81 TABLET ORAL DAILY
Status: DISCONTINUED | OUTPATIENT
Start: 2022-02-04 | End: 2022-02-10 | Stop reason: HOSPADM

## 2022-02-04 RX ORDER — PROPOFOL 10 MG/ML
INJECTION, EMULSION INTRAVENOUS AS NEEDED
Status: DISCONTINUED | OUTPATIENT
Start: 2022-02-04 | End: 2022-02-04 | Stop reason: HOSPADM

## 2022-02-04 RX ORDER — OXYCODONE HYDROCHLORIDE 5 MG/1
5 TABLET ORAL
Status: DISCONTINUED | OUTPATIENT
Start: 2022-02-04 | End: 2022-02-04 | Stop reason: HOSPADM

## 2022-02-04 RX ADMIN — SODIUM CHLORIDE, PRESERVATIVE FREE 10 ML: 5 INJECTION INTRAVENOUS at 14:00

## 2022-02-04 RX ADMIN — OXYCODONE HYDROCHLORIDE 10 MG: 5 TABLET ORAL at 08:20

## 2022-02-04 RX ADMIN — BUDESONIDE AND FORMOTEROL FUMARATE DIHYDRATE 2 PUFF: 160; 4.5 AEROSOL RESPIRATORY (INHALATION) at 07:21

## 2022-02-04 RX ADMIN — SODIUM CHLORIDE 75 ML/HR: 900 INJECTION, SOLUTION INTRAVENOUS at 04:33

## 2022-02-04 RX ADMIN — SODIUM CHLORIDE, SODIUM LACTATE, POTASSIUM CHLORIDE, AND CALCIUM CHLORIDE 100 ML/HR: 600; 310; 30; 20 INJECTION, SOLUTION INTRAVENOUS at 10:17

## 2022-02-04 RX ADMIN — BUDESONIDE AND FORMOTEROL FUMARATE DIHYDRATE 2 PUFF: 160; 4.5 AEROSOL RESPIRATORY (INHALATION) at 19:43

## 2022-02-04 RX ADMIN — TIOTROPIUM BROMIDE INHALATION SPRAY 2 PUFF: 3.12 SPRAY, METERED RESPIRATORY (INHALATION) at 07:21

## 2022-02-04 RX ADMIN — FENTANYL CITRATE 50 MCG: 50 INJECTION INTRAMUSCULAR; INTRAVENOUS at 10:51

## 2022-02-04 RX ADMIN — Medication 10 MG: at 11:35

## 2022-02-04 RX ADMIN — SODIUM CHLORIDE, PRESERVATIVE FREE 10 ML: 5 INJECTION INTRAVENOUS at 20:20

## 2022-02-04 RX ADMIN — PANTOPRAZOLE SODIUM 40 MG: 40 TABLET, DELAYED RELEASE ORAL at 08:20

## 2022-02-04 RX ADMIN — MORPHINE SULFATE 4 MG: 4 INJECTION INTRAVENOUS at 02:41

## 2022-02-04 RX ADMIN — HYDROMORPHONE HYDROCHLORIDE 0.5 MG: 2 INJECTION, SOLUTION INTRAMUSCULAR; INTRAVENOUS; SUBCUTANEOUS at 12:10

## 2022-02-04 RX ADMIN — LISINOPRIL 5 MG: 5 TABLET ORAL at 08:29

## 2022-02-04 RX ADMIN — Medication 10 MG: at 11:40

## 2022-02-04 RX ADMIN — Medication 10 MG: at 11:52

## 2022-02-04 RX ADMIN — Medication 1 AMPULE: at 10:17

## 2022-02-04 RX ADMIN — LIDOCAINE HYDROCHLORIDE 40 MG: 20 INJECTION, SOLUTION EPIDURAL; INFILTRATION; INTRACAUDAL; PERINEURAL at 10:51

## 2022-02-04 RX ADMIN — ACETAMINOPHEN 975 MG: 325 TABLET ORAL at 14:50

## 2022-02-04 RX ADMIN — PHENYLEPHRINE HYDROCHLORIDE 120 MCG: 10 INJECTION INTRAVENOUS at 10:55

## 2022-02-04 RX ADMIN — Medication 10 MG: at 11:10

## 2022-02-04 RX ADMIN — Medication 2 G: at 11:04

## 2022-02-04 RX ADMIN — PANTOPRAZOLE SODIUM 40 MG: 40 TABLET, DELAYED RELEASE ORAL at 17:00

## 2022-02-04 RX ADMIN — TUBERCULIN PURIFIED PROTEIN DERIVATIVE 5 UNITS: 5 INJECTION INTRADERMAL at 03:01

## 2022-02-04 RX ADMIN — Medication 1 AMPULE: at 20:19

## 2022-02-04 RX ADMIN — ACETAMINOPHEN 650 MG: 325 TABLET ORAL at 05:57

## 2022-02-04 RX ADMIN — SODIUM CHLORIDE 100 ML/HR: 900 INJECTION, SOLUTION INTRAVENOUS at 21:36

## 2022-02-04 RX ADMIN — HYDROMORPHONE HYDROCHLORIDE 0.5 MG: 1 INJECTION, SOLUTION INTRAMUSCULAR; INTRAVENOUS; SUBCUTANEOUS at 04:33

## 2022-02-04 RX ADMIN — Medication 10 MG: at 11:53

## 2022-02-04 RX ADMIN — ONDANSETRON 4 MG: 2 INJECTION INTRAMUSCULAR; INTRAVENOUS at 12:16

## 2022-02-04 RX ADMIN — Medication 1 AMPULE: at 14:53

## 2022-02-04 RX ADMIN — ONDANSETRON 4 MG: 2 INJECTION INTRAMUSCULAR; INTRAVENOUS at 11:17

## 2022-02-04 RX ADMIN — TORSEMIDE 10 MG: 20 TABLET ORAL at 08:28

## 2022-02-04 RX ADMIN — METOPROLOL SUCCINATE 100 MG: 100 TABLET, EXTENDED RELEASE ORAL at 08:20

## 2022-02-04 RX ADMIN — PROPOFOL 100 MG: 10 INJECTION, EMULSION INTRAVENOUS at 10:51

## 2022-02-04 RX ADMIN — ASPIRIN 81 MG: 81 TABLET ORAL at 08:20

## 2022-02-04 RX ADMIN — OXYCODONE HYDROCHLORIDE 10 MG: 5 TABLET ORAL at 20:19

## 2022-02-04 RX ADMIN — Medication 10 MG: at 10:57

## 2022-02-04 RX ADMIN — Medication 10 MG: at 11:30

## 2022-02-04 RX ADMIN — ONDANSETRON 4 MG: 2 INJECTION INTRAMUSCULAR; INTRAVENOUS at 16:02

## 2022-02-04 NOTE — PROGRESS NOTES
Consult Received:       Closed right intertrochanteric hip fracture. I have notified Dr. Dee Rausch. Will tentatively plan for surgery today. Keep NPO. Hold blood thinners.

## 2022-02-04 NOTE — PROGRESS NOTES
Problem: Pressure Injury - Risk of  Goal: *Prevention of pressure injury  Description: Document Bobby Scale and appropriate interventions in the flowsheet. Outcome: Progressing Towards Goal  Note: Pressure Injury Interventions:             Activity Interventions: Chair cushion,Pressure redistribution bed/mattress(bed type),PT/OT evaluation    Mobility Interventions: Pressure redistribution bed/mattress (bed type),PT/OT evaluation    Nutrition Interventions: Document food/fluid/supplement intake    Friction and Shear Interventions: Foam dressings/transparent film/skin sealants,HOB 30 degrees or less,Sit at 90-degree angle

## 2022-02-04 NOTE — PROGRESS NOTES
Problem: Falls - Risk of  Goal: *Absence of Falls  Description: Document Darby Marking Fall Risk and appropriate interventions in the flowsheet. Outcome: Progressing Towards Goal  Note: Fall Risk Interventions:            Medication Interventions: Bed/chair exit alarm    Elimination Interventions: Call light in reach    History of Falls Interventions: Bed/chair exit alarm,Investigate reason for fall         Problem: Patient Education: Go to Patient Education Activity  Goal: Patient/Family Education  Outcome: Progressing Towards Goal     Problem: Pressure Injury - Risk of  Goal: *Prevention of pressure injury  Description: Document Bobby Scale and appropriate interventions in the flowsheet. Outcome: Progressing Towards Goal  Note: Pressure Injury Interventions: Activity Interventions: Assess need for specialty bed,Increase time out of bed,Pressure redistribution bed/mattress(bed type)    Mobility Interventions: Assess need for specialty bed,Pressure redistribution bed/mattress (bed type),Turn and reposition approx.  every two hours(pillow and wedges),PT/OT evaluation,HOB 30 degrees or less    Nutrition Interventions: Document food/fluid/supplement intake    Friction and Shear Interventions: Foam dressings/transparent film/skin sealants,HOB 30 degrees or less,Sit at 90-degree angle                Problem: Patient Education: Go to Patient Education Activity  Goal: Patient/Family Education  Outcome: Progressing Towards Goal

## 2022-02-04 NOTE — PROGRESS NOTES
Physical Therapy Note:    Holding PT per RN request as pt just arrived to floor and has low BP. Will try back at later time/ date pending pt status and availability.      Thank Ashley Alvarado PT, DPT, CSCS

## 2022-02-04 NOTE — PROGRESS NOTES
02/04/22 0433   Dual Skin Pressure Injury Assessment   Dual Skin Pressure Injury Assessment X   Reddness to outter L big toe  Reddness to elbows  Raised area to back  Scattered brusies    Done with [facility] RN

## 2022-02-04 NOTE — ANESTHESIA PREPROCEDURE EVALUATION
Relevant Problems   RESPIRATORY SYSTEM   (+) COPD, very severe (HCC)   (+) Centrilobular emphysema (HCC)      CARDIOVASCULAR   (+) CAD S/P percutaneous coronary angioplasty   (+) Coronary artery disease involving native coronary artery of native heart without angina pectoris   (+) Essential hypertension, benign   (+) Hypertensive urgency   (+) Ventricular ectopy       Anesthetic History     PONV          Review of Systems / Medical History  Patient summary reviewed and pertinent labs reviewed    Pulmonary    COPD: moderate            Comments: Wears O2 at night     Lung nodule for bronch    Neuro/Psych         Psychiatric history (anxiety )    Comments: Carotid stenosis s/p CEA Cardiovascular    Hypertension          Past MI, CAD, PAD (carotid stenosis s/p CEA) and cardiac stents    Exercise tolerance: >4 METS  Comments: Reported normal LV function with cath 5/31/19, new RCA stent then, prior >10 stents, occluded LCx, open LAD and RCA with multiple stents    Echo 6/2019 - EF 55-60%   GI/Hepatic/Renal  Within defined limits              Endo/Other        Arthritis     Other Findings              Physical Exam    Airway  Mallampati: II  TM Distance: 4 - 6 cm  Neck ROM: normal range of motion   Mouth opening: Normal    Comments: Short chin    Prior Grade 2 view with DL Cardiovascular    Rhythm: regular  Rate: normal         Dental    Dentition: Caps/crowns and Bridges     Pulmonary  Breath sounds clear to auscultation               Abdominal  GI exam deferred       Other Findings            Anesthetic Plan    ASA: 3  Anesthesia type: general  8.5 ETT        Induction: Intravenous  Anesthetic plan and risks discussed with: Patient and Spouse      Off plavix 2 days

## 2022-02-04 NOTE — DISCHARGE INSTRUCTIONS
[facility]      Patient Discharge Instructions    Booker Randall / [id number] : 1950    Admitted 2/3/2022 Discharged: 2022     IF YOU HAVE ANY PROBLEMS ONCE YOU ARE  [address] FOLLOWING NUMBERS:   Main office number: [phone] ask for Lesa Gaffney (medical assistant with Dr. Nima Haas)  Office Address: [facility] [address] [facility], [address] [facility]        Weight bearing status: As tolerated with walker and assistance        Diet  · Resume regular diet  · If experiencing nausea and vomiting, call your doctor. Medications    · The medications you are to continue on are listed on the medication reconciliation sheet. · Narcotic pain medications as well as supplemental iron can cause constipation. If this occurs try stopping the narcotic pain medication and/or the iron. · It is important that you take the medication exactly as they are prescribed. · Keep your medication in the bottles provided by the pharmacist and keep a list of the medication names, dosages, and times to be taken in your wallet. · Do not take other medications without consulting your doctor. Other Important Information    Do NOT smoke as this will greatly increase your risk of infection! Do not drive and operate hazardous machinery until being cleared to do so by your doctor     Swelling and warmth is normal for 6 months after surgery. If you experience swelling in your leg elevate you leg while laying down with your toes above your heart. If you have sudden onset severe swelling with leg pain call our office. The stitches deep inside take approximately 6 months to dissolve. There will be sharp shooting, stinging and burning pain. This is normal and will resolve between 3-6 months after surgery. Difficulty sleeping is normal following surgery. You may try melatonin, an over-the-counter sleep aid or benadryl to help with sleep.  Most patients will resume sleeping through the night 5-8 weeks after surgery. Home Physical Therapy is arranged. Home Health will contact you within 48 hrs of discharge that you have chosen. If you have not received a call within this time frame please contact that provider you chose. You should be given this information before you leave the hospital.     You are at a risk for falls. Use the rolling walker when walking. Patients should not drive if they are still taking narcotic pain mediation during the daytime hours. Most patients wean themselves off of pain medication within 2-5 weeks after surgery. Please give a list of your current medications to your Primary Care Provider and update this list whenever your medications are discontinued, doses are changed, or new medications (including over-the-counter products) are added. Please carry medication information at all times in case of emergency situations. You may be retaining fluid if you have a history of heart failure or if you experience any of the following symptoms:  Weight gain of 3 pounds or more overnight or 5 pounds in a week, increased swelling in our hands or feet or shortness of breath while lying flat in bed. Please call your doctor as soon as you notice any of these symptoms; do not wait until your next office visit. If you have sleep apnea and have a CPAP machine, please use it for all naps and sleeping. When to Call the office    - If you have a temperature greater then 101  - Excessive bleeding that does not stop after holding pressure over the area  - Excessive redness, swelling or bruising, and/ or green or yellow, smelly discharge from incision  - Uncontrolled vomiting   - Loose control of your bladder or bowel function  - Need a pain medication refill   - Need to change your follow up appointment     Information obtained by :  I understand that if any problems occur once I am at home I am to contact my physician.     I understand and acknowledge receipt of the instructions indicated above.                                                                                                                                            Physician's or R.N.'s Signature                                                                  Date/Time                                                                                                                                              Patient or Representative Signature                                                          Date/Time

## 2022-02-04 NOTE — H&P
[facility] Initial Cardiac Evaluation                 Date of  Admission: 2/3/2022 10:44 PM     Primary Care Physician: Renzo Banks MD  Primary Cardiologist: Dr Miguelina Valdes  Referring Physician: Dr Tri Chanel  Attending Physician: Dr Suh Older: elevated troponin      Jere Ordoñez is a 66 y.o. female admitted for Other fracture of right femur, initial encounter for closed fracture ([address] Joe Younger. She has a h/o COPD on 1L O2 at night (smoked 1/2 ppd from age 11 until quit 2019),lymph edema (resolved w lymph massage) and CAD w multiple prior stents, [facility] 5-2019 w patent stents in LAD, chronically occluded L circ, subtotal occlusion of stents in RCA s/p PCI. She was admitted 2-3 after a fall w a R hip fracture. Pt has not missed any meds. She has a T12 cx fx that is very painful to her, particularly with bending over. The pain from her back radiates to her axilla, is a \"cold feeling\", without SOB, n/v or diaphoresis, not exertional, but gets better w nitro which she is using once a day. She has rare dizziness w standing, better when she bends her head forward. No syncope. No symptoms at the time of fall, she was bending over to  a coffee cup and lurched when she almost dropped it and fell on her R leg. No LE edema or weight gain. In ER WBC 7, hgb 11.5, , K 3.6, cr .90, HS trop 22 and 42, /103, rapid covid negative, CXR COPD and CAD, EKG NSR w rate 85 w NSST changes unchanged from prior EKG. Currently on prednisone for COPD. .    Soc: From [place], [place], worked in [employer], smoked 1/2 ppd from age 11 until quit 2019  FH: Adopted- unknown  COVID: vax x 2 w booster     Patient Active Problem List   Diagnosis Code    Personal history of tobacco use Z87.891    History of MI (myocardial infarction) I25.2    COPD, very severe ([address] J44.9    Hypoxemia R09.02    Hyperlipidemia E78.5    Essential hypertension, benign I10    Coronary artery disease involving native coronary artery of native heart without angina pectoris I25.10    Chronic anxiety F41.9    Ventricular ectopy I49.3    Chest pain R07.9    CAD S/P percutaneous coronary angioplasty I25.10, Z98.61    Hypertensive urgency I16.0    Chronic respiratory failure with hypoxia (HCC) J96.11    Carotid stenosis I65.29    Carotid stenosis, right I65.21    Centrilobular emphysema ([facility]) J43.2    Lung nodule R91.1    H/O carotid endarterectomy Z98.890    Right ear pain H92.01    Ischemic heart disease, hx pci, cath/pci last 5/2019 I25.9    Palliative care patient Z51.5    DNR (do not resuscitate) Z66    Acute bilateral low back pain without sciatica M54.50    Closed right hip fracture ([facility]) S72.001A    Other fracture of right femur, initial encounter for closed fracture ([facility] [address] S17.4H4M       Past Medical History:   Diagnosis Date    Arrhythmia     Arthritis     CAD (coronary artery disease) 2009, 8/19/2013    PCI,  Marsha Shepherdff     Carotid stenosis, right     endarterectomy 11/25/19    Chronic anxiety 4/27/2017    COPD     recent referral to [facility] for lung transplant    Emphysema lung ([facility] [address]     GERD (gastroesophageal reflux disease)     controlled with medication    History of echocardiogram 06/07/2019    History of echocardiogram     echo 06/07/19 LVEF 55-60%    Hypertension     Nausea & vomiting     Oxygen dependent     1 liter at night    Pleurisy     Thyroid disease     pt denies      Past Surgical History:   Procedure Laterality Date    HX GYN      rebuilt cervix    HX HEART CATHETERIZATION  06/06/2019    last stent- per patient- 10 stents total    HX OTHER SURGICAL      HX TONSIL AND ADENOIDECTOMY      VASCULAR SURGERY PROCEDURE UNLIST Right 11/25/2019    carotid endarterectomy     Allergies   Allergen Reactions    Promethazine Nausea and Vomiting and Other (comments)     Severe vomiting       Family History   Problem Relation Age of Onset    No Known Problems Mother         adopted Social History     Tobacco Use    Smoking status: Former Smoker     Packs/day: 0.75     Years: 53.00     Pack years: 39.75     Types: Cigarettes     Quit date: 2018     Years since quittin.0    Smokeless tobacco: Never Used   Substance Use Topics    Alcohol use: Yes     Comment: rarely        Current Facility-Administered Medications   Medication Dose Route Frequency    0.9% sodium chloride infusion  75 mL/hr IntraVENous CONTINUOUS    sodium chloride 0.9 % bolus infusion 250 mL  250 mL IntraVENous CONTINUOUS    sodium chloride (NS) flush 5-40 mL  5-40 mL IntraVENous Q8H    sodium chloride (NS) flush 5-40 mL  5-40 mL IntraVENous PRN    acetaminophen (TYLENOL) tablet 650 mg  650 mg Oral Q8H    tuberculin injection 5 Units  5 Units IntraDERMal ONCE    oxyCODONE IR (ROXICODONE) tablet 10 mg  10 mg Oral Q3H PRN    HYDROmorphone (DILAUDID) injection 0.5 mg  0.5 mg IntraVENous Q6H PRN    aspirin delayed-release tablet 81 mg  81 mg Oral DAILY    budesonide-formoteroL (SYMBICORT) 160-4.5 mcg/actuation HFA inhaler 2 Puff  2 Puff Inhalation BID RT    metoprolol succinate (TOPROL-XL) XL tablet 100 mg  100 mg Oral DAILY    pantoprazole (PROTONIX) tablet 40 mg  40 mg Oral BID    lisinopriL (PRINIVIL, ZESTRIL) tablet 5 mg  5 mg Oral DAILY    tiotropium bromide (SPIRIVA RESPIMAT) 2.5 mcg /actuation  2 Puff Inhalation DAILY    torsemide (DEMADEX) tablet 10 mg  10 mg Oral DAILY    predniSONE (DELTASONE) tablet 10 mg  10 mg Oral DAILY WITH BREAKFAST    ondansetron (ZOFRAN) injection 4 mg  4 mg IntraVENous Q4H PRN       Review of Symptoms:  General: no weight change,  no weakness, fever or chills  Skin: no rashes, lumps, or other skin changes  HEENT: no headache, dizziness rarely w standing   Neck: no swollen glands, goiter, pain or stiffness  Respiratory: no cough, sputum, hemoptysis, no dyspnea, wheezing  Cardiovascular: + as per HPI  Gastrointestinal: no GERD, constipation, diarrhea, liver problems, or h/o GI bleed  Urinary: no frequency, urgency , hematuria, burning/pain with urination, recent flank pain, polyuria, nocturia, or difficulty urinating  Peripheral Vascular: no claudication, leg cramps, prior DVTs, swelling of calves, legs, or feet, color change, or swelling with redness or tenderness  Musculoskeletal: + back pain w compression fx  Psychiatric: no depression or excessive stress  Neurological: no sensory or motor loss, seizures, syncope, tremors, numbness, no dementia  Hematologic: no anemia, easy bruising or bleeding  Endocrine: no thyroid problems, heat or cold intolerance, excessive sweating, polyuria, polydipsia, no  diabetes.        Physical Exam  Vitals:    02/04/22 0104 02/04/22 0245 02/04/22 0430 02/04/22 0724   BP:  (!) 117/52 129/64    Pulse:  66 72    Resp:  15 16    Temp:   97.4 °F (36.3 °C)    SpO2: 97% 98% 96% 98%   Weight:       Height:           Physical Exam:  General: Well Developed, Well Nourished, No Acute Distress  Head: normocephalic atraumatic   ENT: pupils equal and round, no abnormalities noted  Neck: supple, no JVD, no carotid bruits  Heart: S1S2 with RRR without murmurs   Lungs: Clear throughout auscultation bilaterally without adventitious sounds  Abd: soft, nontender, nondistended, with good bowel sounds  Ext: warm, no edema LLE, RLE in hip stabilizer   Skin: warm and dry  Psychiatric: Normal mood and affect  Neurologic: Alert and oriented X 3    Labs:   Recent Labs     02/03/22  2300      K 3.6   BUN 15   CREA 0.90   GLU 97   WBC 7.2   HGB 11.5*   HCT 35.8           Assessment/Plan:     Assessment:   Other fracture of right femur, initial encounter for closed fracture (HCC) (2/4/2022)- mild risk from CV standpoint for complication with non elective surgery due to underlying CAD, no further evaluation needed prior to proceeding with hip surgery    COPD, very severe ([facility] [address] (8/20/2013)- 1L O2 at night, cont current meds    Hyperlipidemia (10/10/2016)- statin    Essential hypertension, benign (10/10/2016)- ACE, BB    CAD S/P percutaneous coronary angioplasty (5/31/2019)- [facility] 5-2019 w patent stents in LAD, chronically occluded L circ, subtotal occlusion of stents in RCA s/p PCI   - ok to hold plavix, cont ASA   - cont BB, ACE, statin   - needs refill on nitro on d/c     Thank you very much for this referral. We appreciate the opportunity to participate in this patient's care. We will follow along with above stated plan.     Trinh Ferrell PA-C  Consulting MD: [place]

## 2022-02-04 NOTE — H&P
[facility]ist History and Physical       Name:  Ehsan Dominguez  Age:71 y.o. Sex:female   :  1950    MRN:  [id number]   PCP:  Grzegorz Bustos MD      Admit Date:  2/3/2022 10:44 PM   Chief Complaint: \"Pt arrives to ED via EMS coming from home. Pt reports bending over to to  water and lost her balance falling to the ground injuring her the middle to lower part of right leg. Pt denies any LOC or dizziness. Pt also denies SOB, CP, N/V. EMS administered 2 doses of 5mg morphine and one dose of 4mg Zofran\"    Reason for Admission:     Closed right hip fracture ([facility] [address] () 65 yo woman, h/o oxygen and steroid dependent COPD, CAD s/p PCI, HTN, presents after fall at home, now with right hip fracture    Assessment & Plan:     Principal Problem:    Closed right hip fracture ([address] (2022)        Active Problems:    COPD, very severe ([address] (2013)          Hyperlipidemia (10/10/2016)          Essential hypertension, benign (10/10/2016)          CAD S/P percutaneous coronary angioplasty (2019)            PLAN:  1) Admit Med Bed, Inpatient status due to medical complexity, need for close cardiopulmonary monitoring given multiple comorbidities, & need for services/meds not readily available   2) Will consult Orthopedic NP/MD in AM  3) Will keep NPO after MN in anticipation of surgery  4) resume home meds as appropriate. Resume plavix post op when ok with ortho  5) SCDs for DVT prophy until post op, then lovenox  6) plan of care discussed with patient and family   7) Place on remote telemetry due to h/o CAD. Her Cardiologist is Dr. Saji Bashir if needed. Closely monitor cardiac status  8) Continue with home oxygen requirements, steroids, and nebs/MDIs. Dr. Oracio Urban is her pulmonologist if needed.  Closely monitor pulmonary status          Disposition/Expected LOS: 3  Diet: DIET NPO  VTE ppx: SCD  GI ppx:   Code status: Prior  Surrogate decision-maker:       History of Presenting Illness: Pernell Begum is a 66 y.o. female with medical history of oxygen and steroid dependent COPD, CAD s/p PCI in 2019 (stable cardiac status since that time),  HTN, who presented to ED after she bent over and lost her balance. She ended up falling backwards, hitting her hip. Patient has severe right hip and thigh pain. She was unable to ambulate. EMS has brought her for evaluation and is given her a total of 10 of morphine prior to arrival.  Patient denies headache head injury neck pain or back pain. Isolated injury to right hip and thigh area. Workup in ER revealed impacted right femur intertrochanteric fracture. Other labs look stable  She denies any other symptoms at this time, specifically denies CP or SOB. NO kidney or liver disease. She does use oxygen especially at night. She is dependent on steroids    Guevara -->Pulm  Shipman-- > Card    Review of Systems:  A 14 point review of systems was taken and pertinent positive as per HPI.         Past Medical History:   Diagnosis Date    Arrhythmia     Arthritis     CAD (coronary artery disease) 2009, 8/19/2013    PCI,  Bong Hannah     Carotid stenosis, right     endarterectomy 11/25/19    Chronic anxiety 4/27/2017    COPD     recent referral to [address] for lung transplant    Emphysema lung ([facility] [address]     GERD (gastroesophageal reflux disease)     controlled with medication    History of echocardiogram 06/07/2019    History of echocardiogram     echo 06/07/19 LVEF 55-60%    Hypertension     Nausea & vomiting     Oxygen dependent     1 liter at night    Pleurisy     Thyroid disease     pt denies       Past Surgical History:   Procedure Laterality Date    HX GYN      rebuilt cervix    HX HEART CATHETERIZATION  06/06/2019    last stent- per patient- 10 stents total    HX OTHER SURGICAL      HX TONSIL AND ADENOIDECTOMY      VASCULAR SURGERY PROCEDURE UNLIST Right 11/25/2019    carotid endarterectomy       Family History : reviewed  Family History Problem Relation Age of Onset    No Known Problems Mother         adopted        Social History     Tobacco Use    Smoking status: Former Smoker     Packs/day: 0.75     Years: 53.00     Pack years: 39.75     Types: Cigarettes     Quit date:      Years since quittin.0    Smokeless tobacco: Never Used   Substance Use Topics    Alcohol use: Yes     Comment: rarely       Allergies   Allergen Reactions    Promethazine Nausea and Vomiting and Other (comments)     Severe vomiting        Immunization History   Administered Date(s) Administered    COVID-19, Pfizer Purple top, DILUTE for use, 12+ yrs, 30mcg/0.3mL dose 2021, 2021, 2021    Influenza High Dose Vaccine PF 2014, 10/08/2015, 10/10/2016, 2017, 2018    Influenza Vaccine 10/01/2012, 2013    Influenza Vaccine (Tri) Adjuvanted (>65 Yrs FLUAD TRI 35192) 2019    Influenza, High-dose, Quadrivalent (>65 Yrs Fluzone High Dose Quad 54252) 10/06/2020, 2021    Pneumococcal Conjugate (PCV-13) 2014    Pneumococcal Polysaccharide (PPSV-23) 2008, 2014, 2018    Tdap 2018    Zoster Recombinant 2019, 2019         PTA Medications:  Current Outpatient Medications   Medication Instructions    albuterol (PROVENTIL HFA, VENTOLIN HFA, PROAIR HFA) 90 mcg/actuation inhaler 2 Puffs, Inhalation, EVERY 4 HOURS AS NEEDED    albuterol (PROVENTIL VENTOLIN) 2.5 mg, Nebulization, EVERY 6 HOURS AS NEEDED, ICD-10-J44.9, please bill to Med B    aspirin delayed-release 81 mg tablet DAILY    atorvastatin (LIPITOR) 80 mg tablet TAKE 1 TABLET BY MOUTH DAILY    azithromycin (ZITHROMAX) 500 mg tab TAKE 1 TABLET BY MOUTH EVERY MONDAY, WEDNESDAY, AND FRIDAY    budesonide-formoteroL (Symbicort) 160-4.5 mcg/actuation HFAA 2 Puffs, Inhalation, 2 TIMES DAILY    calcium citrate-vitamin D3 (Calcitrate-Vitamin D) tablet Oral, 2 TIMES DAILY    cetirizine (ZYRTEC) 10 mg tablet Oral, DAILY AS NEEDED    clopidogreL (PLAVIX) 75 mg tab TAKE 1 TABLET BY MOUTH DAILY    ezetimibe (ZETIA) 10 mg, Oral, DAILY    guaiFENesin ER (MUCINEX) 600 mg, Oral, 2 TIMES DAILY AS NEEDED    metoprolol succinate (TOPROL-XL) 100 mg, Oral, DAILY    multivitamin (ONE A DAY) tablet 1 Tablet, DAILY    multivitamin/folic acid/biotin (HAIR-SKIN-NAILS, MV-FA-BIOTIN, PO) Oral    nitroglycerin (NITROSTAT) 0.4 mg, SubLINGual, EVERY 5 MIN AS NEEDED    nitroglycerin (NITROSTAT) 0.4 mg, SubLINGual, 3 TIMES DAILY AS NEEDED, Up to 3 doses.  OTHER Handicap placard    OXYGEN-AIR DELIVERY SYSTEMS Does Not Apply, 1lpm qhs    pantoprazole (PROTONIX) 40 mg tablet TAKE 1 TABLET BY MOUTH TWICE DAILY    predniSONE (DELTASONE) 20 mg tablet Prednisone 40 mg every day x 3 days, 30 mg every day x 3 days, 20 mg every day x 3 days, 10 mg every day x 3 days, and then 10 mg every other day x 1 week    predniSONE (DELTASONE) 20 mg tablet 2 tabs po qd x 3 days, 1 and 1/2 tabs po qd x 3 days, 1 tab po qd x 3 days, 1/2 tab po qd x 3 days, or as directed.  predniSONE (DELTASONE) 5 mg, Oral, 2 TIMES DAILY    ramipriL (ALTACE) 5 mg, Oral, DAILY    roflumilast (DALIRESP) 500 mcg, Oral, DAILY    spironolactone (ALDACTONE) 25 mg, Oral, DAILY    tiotropium (SPIRIVA WITH HANDIHALER) 18 mcg, Inhalation, DAILY    torsemide (DEMADEX) 10 mg, Oral, DAILY       Objective:     Patient Vitals for the past 24 hrs:   Temp Pulse Resp BP SpO2   02/04/22 0104 -- -- -- -- 97 %   02/04/22 0102 -- 62 -- -- --   02/04/22 0100 -- -- 17 (!) 135/50 --   02/04/22 0000 -- 65 14 (!) 126/55 96 %   02/03/22 2335 -- -- -- -- (!) 87 %   02/03/22 2249 98.7 °F (37.1 °C) 76 16 (!) 140/103 95 %       Oxygen Therapy  O2 Sat (%): 97 % (02/04/22 0104)  Pulse via Oximetry: 59 beats per minute (02/04/22 0104)  O2 Device: Nasal cannula (02/03/22 2337)  O2 Flow Rate (L/min): 2 l/min (02/03/22 2337)    Body mass index is 22.81 kg/m².     Physical Exam:    General:  Alert and oriented x 3, Mild acute distress due to pain, speaking in full sentences  HEENT:  Pupils equal and reactive to light and accommodation, oropharynx is clear   Neck:   Supple, no lymphadenopathy, no JVD   Lungs:  Clear to auscultation bilaterally   CV:   Regular rate, regular rhythm, with normal S1 and S2   Abdomen:  Soft, nontender, nondistended, normoactive bowel sounds   Extremities:  No cyanosis clubbing or edema. Right leg in splint, shortened and rotated  Neuro:  Nonfocal, A&O x3   Psych:  Normal mood and affect       Data Reviewed: I have reviewed all labs, meds, and studies. Recent Results (from the past 24 hour(s))   CBC W/O DIFF    Collection Time: 02/03/22 11:00 PM   Result Value Ref Range    WBC 7.2 4.3 - 11.1 K/uL    RBC 3.88 (L) 4.05 - 5.2 M/uL    HGB 11.5 (L) 11.7 - 15.4 g/dL    HCT 35.8 35.8 - 46.3 %    MCV 92.3 79.6 - 97.8 FL    MCH 29.6 26.1 - 32.9 PG    MCHC 32.1 31.4 - 35.0 g/dL    RDW 14.1 11.9 - 14.6 %    PLATELET 884 217 - 917 K/uL    MPV 9.7 9.4 - 12.3 FL    ABSOLUTE NRBC 0.00 0.0 - 0.2 K/uL   METABOLIC PANEL, COMPREHENSIVE    Collection Time: 02/03/22 11:00 PM   Result Value Ref Range    Sodium 140 136 - 145 mmol/L    Potassium 3.6 3.5 - 5.1 mmol/L    Chloride 105 98 - 107 mmol/L    CO2 28 21 - 32 mmol/L    Anion gap 7 7 - 16 mmol/L    Glucose 97 65 - 100 mg/dL    BUN 15 8 - 23 MG/DL    Creatinine 0.90 0.6 - 1.0 MG/DL    GFR est AA >60 >60 ml/min/1.73m2    GFR est non-AA >60 >60 ml/min/1.73m2    Calcium 8.9 8.3 - 10.4 MG/DL    Bilirubin, total 0.3 0.2 - 1.1 MG/DL    ALT (SGPT) 26 12 - 65 U/L    AST (SGOT) 29 15 - 37 U/L    Alk.  phosphatase 79 50 - 136 U/L    Protein, total 6.6 6.3 - 8.2 g/dL    Albumin 3.2 3.2 - 4.6 g/dL    Globulin 3.4 2.3 - 3.5 g/dL    A-G Ratio 0.9 (L) 1.2 - 3.5     TROPONIN-HIGH SENSITIVITY    Collection Time: 02/03/22 11:00 PM   Result Value Ref Range    Troponin-High Sensitivity 22.2 (H) 0 - 14 pg/mL   COVID-19 RAPID TEST    Collection Time: 02/03/22 11:43 PM   Result Value Ref Range    Specimen source NASAL      COVID-19 rapid test Not detected NOTD         EKG Results     Procedure 720 Value Units Date/Time    EKG, 12 LEAD, INITIAL [[id number]]     Order Status: Completed           All Micro Results     Procedure Component Value Units Date/Time    COVID-19 RAPID TEST [[id number]] Collected: 02/03/22 2343    Order Status: Completed Specimen: Nasopharyngeal Updated: 02/04/22 0037     Specimen source NASAL        COVID-19 rapid test Not detected        Comment:      The specimen is NEGATIVE for SARS-CoV-2, the novel coronavirus associated with COVID-19. A negative result does not rule out COVID-19. This test has been authorized by the FDA under an Emergency Use Authorization (EUA) for use by authorized laboratories. Fact sheet for Healthcare Providers: ConventionAlsy[employer] Technologiesdate.co.nz  Fact sheet for Patients: ConventionUpdate.co.nz       Methodology: Isothermal Nucleic Acid Amplification               Other Studies:  XR CHEST SNGL V    Result Date: 2/3/2022  EXAM: Chest x-ray. INDICATION: Presurgical evaluation. Hip fracture. COMPARISON: Prior CT chest on October 8, 2021. TECHNIQUE: 2 frontal views of the chest were obtained. FINDINGS: Again noted are emphysematous changes in the lungs with stable scarring or nodularity in the left midlung. No acute infiltrate is seen. The heart size, mediastinal contour and pulmonary vasculature are normal. There are coronary artery calcifications or stents. No pneumothorax, pleural effusion or acute displaced fracture is seen. 1. No acute process. 2. Emphysema and coronary artery disease. XR HIP RT W OR WO PELV 2-3 VWS    Result Date: 2/3/2022  EXAM: Pelvis and right hip x-rays. INDICATION: Pain, injury. COMPARISON: None. TECHNIQUE: A frontal view of the pelvis was supplemented with 2 dedicated views of the right hip joint. FINDINGS: There is an impacted right femur intertrochanteric fracture.  No other fractures are identified. The pelvic ring is intact. There is moderate right and mild left hip joint osteoarthritis. Right femur intertrochanteric fracture. XR FEMUR RT 2 VS    Result Date: 2/3/2022  EXAM: Right femur x-rays. INDICATION: Pain, injury. COMPARISON: Subsequent dedicated right hip x-rays. TECHNIQUE: 4 views. FINDINGS: As noted on hip x-ray report, there is an impacted right femur intertrochanteric fracture. No fractures are seen in the more distal right femur. Vascular calcifications are noted in the soft tissues. As above.          Medications:  Medications Administered            Signed By: Balwinder Cordero MD   [facility] Hospitalist [facility]    February 4, 2022   1:35 AM

## 2022-02-04 NOTE — PROGRESS NOTES
Report called to this RN at 1240. Pt arrived to room 732, via bed at 1345. Pt c/o mild nausea. Gingerale given to pt at this time, blood pressure low, IV fluids resumed. Dressing clean, dry and intact to right posterior hip.

## 2022-02-04 NOTE — ANESTHESIA POSTPROCEDURE EVALUATION
Procedure(s):  RIGHT GAMMA NAIL INSERTION. general    Anesthesia Post Evaluation      Multimodal analgesia: multimodal analgesia used between 6 hours prior to anesthesia start to PACU discharge  Patient location during evaluation: PACU  Patient participation: complete - patient participated  Level of consciousness: awake and awake and alert  Pain management: adequate  Airway patency: patent  Anesthetic complications: no  Cardiovascular status: acceptable  Respiratory status: acceptable  Hydration status: acceptable  Post anesthesia nausea and vomiting:  controlled      INITIAL Post-op Vital signs:   Vitals Value Taken Time   BP 85/49 02/04/22 1310   Temp 37.1 °C (98.7 °F) 02/04/22 1231   Pulse 80 02/04/22 1318   Resp 18 02/04/22 1255   SpO2 99 % 02/04/22 1318   Vitals shown include unvalidated device data.

## 2022-02-04 NOTE — OP NOTES
Operative Report    Patient: Teresa Nath MRN: [id number]  SSN: [id number]    YOB: 1950  Age: 66 y.o. Sex: female       Date of Surgery: 2/4/2022     Preoperative Diagnosis: RIGHT HIP FX     Postoperative Diagnosis: Right intertrochanteric femur fracture     Surgeon(s) and Role:     Gino Sanchez MD - Primary    Anesthesia: Spinal     Procedure: Procedure(s):  RIGHT GAMMA NAIL INSERTION     Procedure in Detail: Patient was taken to the operating room and placed under general anesthesia. He was placed supine on the fracture table. The left foot placed in the well-leg mancia. The right foot placed in the traction device. The right intertrochanteric hip fracture was then reduced under fluoroscopy. After adequate reduction was performed we prepped and draped the right hip in sterile fashion. I then made a centimeter incision longitudinally just proximal to the greater trochanter of the right hip. Size down to the fascia. I then split the fascia digitally. I then used the awl to make our starting point on the greater trochanter for the gamma nail. The awl was inserted we placed the guidewire through the awl across the fracture down the canal of the femur. This was confirmed on AP and lateral fluoroscopy. The awl was then removed. We reamed distally just to 12 mm to accommodate the gamma nail distally. We then reamed proximally to open the canal.  We then placed the 11 mm x 180 mm gamma nail over the guidewire. Using the insertion handle we placed the lag screw across the fracture into the femoral head in standard fashion. Again this was confirmed on AP and lateral fluoroscopy. We then used the insertion handle for the distal locking screw. Gave us excellent fixation and excellent reduction. Therefore we placed the set screw in the superior portion of the nail. Irrigated the wounds closed this with 2-0 Vicryls and staples.   This was covered with a sterile dressing. The patient was then taken to the operating room in stable condition. There were no complications. Estimated Blood Loss: Minimal    Tourniquet Time: * No tourniquets in log *      Implants:   Implant Name Type Inv. Item Serial No.  Lot No. LRB No. Used Action   NAIL KT TROCH 125D [id number] TI -- STRL GAMMA 3 - [id number]  NAIL KT TROCH 125D [id number] TI -- STRL GAMMA 3  [employer] [id number] Right 1 Implanted   SCR BNE LAG GAMMA 3 [id number] -- TI STRL - [id number]  SCR BNE LAG GAMMA 3 [id number] -- TI STRL  [employer] Prism [employer]yl[employer] [id number] Right 1 Implanted   SCR BNE LCK T2 FT 5X32.5MM TI -- STRL - [id number]  SCR BNE LCK T2 FT 5X32.5MM TI -- STRL  [employer] [id number] Right 1 Implanted               Specimens: * No specimens in log *        Drains: None                Complications: None    Counts: Sponge and needle counts were correct times two.     Signed By:  Chris Rivera MD     February 4, 2022

## 2022-02-04 NOTE — PROGRESS NOTES
Hospitalist Progress Note   Admit Date:  2/3/2022 10:44 PM   Name:  Booker Randall   Age:  66 y.o. Sex:  female  :  1950   MRN:  [id number]   Room:  2/    Presenting Complaint: Hip Injury    Reason(s) for Admission: Other fracture of right femur, initial encounter for closed fracture [facility]) [address] Interval History:   Booker Randall is a 66 y.o. female with medical history of oxygen and steroid dependent COPD, CAD s/p PCI in 2019 (stable cardiac status since that time),  HTN, who presented to ED after she bent over and lost her balance. She ended up falling backwards, hitting her hip. Patient has severe right hip and thigh pain.  She was unable to ambulate.  EMS has brought her for evaluation and is given her a total of 10 of morphine prior to arrival.  Patient denies headache head injury neck pain or back pain.  Isolated injury to right hip and thigh area.   Workup in ER revealed impacted right femur intertrochanteric fracture. Other labs look stable  She denies any other symptoms at this time, specifically denies CP or SOB. NO kidney or liver disease. She does use oxygen especially at night. She is dependent on steroids    Subjective (22): Patient was seen and examined at the bedside. Patient expected to go to the OR today for right femur fracture.     Assessment & Plan:   Other fracture of right femur, initial encounter for closed fracture [facility]) (2022)  -Patient admitted to medical bed due to medical complexity  -Cardiology consulted for clearance for surgery  -Orthopedic consulted  -N.p.o. after midnight  -Resume Plavix postop per Ortho recommendations  -SCDs for DVT prophylaxis  -Remote telemetry due to history of coronary artery disease    COPD  -Continue with home oxygen requirements  -Continue steroids  -Continue nebs  -Dr. Jr Gracia pulmonologist if needed    Hyperlipidemia  -Continue home medication    Essential hypertension  -Continue home medication    Coronary artery disease status post percutaneous coronary angioplasty  -Plavix on hold, continue aspirin  -Continue beta-blocker, ACE, statin      Dispo/Discharge Planning:    Pending clinical course. PT/OT.   Possible discharge next 24 hours    Diet:  ADULT DIET Regular  DVT PPx: Per Ortho  Code status: Full Code    Hospital Problems as of 2/4/2022 Date Reviewed: 11/9/2021          Codes Class Noted - Resolved POA    Closed right hip fracture (RUST 75.) ICD-10-CM: S72.001A  ICD-9-CM: 820.8  2/4/2022 - Present Unknown        * (Principal) Other fracture of right femur, initial encounter for closed fracture [facility]) ICD-10-CM: O01.3K9B  ICD-9-CM: 821.00  2/4/2022 - Present Unknown        CAD S/P percutaneous coronary angioplasty ICD-10-CM: I25.10, Z98.61  ICD-9-CM: 414.01, V45.82  5/31/2019 - Present Yes        Hyperlipidemia ICD-10-CM: E78.5  ICD-9-CM: 272.4  10/10/2016 - Present Yes        Essential hypertension, benign ICD-10-CM: I10  ICD-9-CM: 401.1  10/10/2016 - Present Yes        COPD, very severe (RUST 75.) ICD-10-CM: J44.9  ICD-9-CM: 235  8/20/2013 - Present Yes              Objective:     Patient Vitals for the past 24 hrs:   Temp Pulse Resp BP SpO2   02/04/22 1410 -- 73 -- (!) 93/40 97 %   02/04/22 1405 97.6 °F (36.4 °C) 77 15 (!) 84/45 97 %   02/04/22 1255 -- 75 18 (!) 100/49 100 %   02/04/22 1251 -- 77 18 (!) 95/46 100 %   02/04/22 1246 -- 77 18 (!) 102/51 100 %   02/04/22 1241 -- 81 16 (!) 98/50 99 %   02/04/22 1236 -- 74 18 (!) 95/49 99 %   02/04/22 1231 98.7 °F (37.1 °C) 80 18 (!) 96/48 99 %   02/04/22 1226 -- 91 18 (!) 98/50 99 %   02/04/22 1221 -- 87 18 (!) 98/50 99 %   02/04/22 1216 -- 90 16 (!) 100/51 99 %   02/04/22 1211 -- 88 16 (!) 111/53 99 %   02/04/22 1204 -- 90 15 (!) 113/56 97 %   02/04/22 1159 -- 82 15 (!) 112/54 94 %   02/04/22 1157 98.8 °F (37.1 °C) 80 16 (!) 112/54 98 %   02/04/22 0954 98.1 °F (36.7 °C) 65 18 (!) 106/53 99 %   02/04/22 0853 98.8 °F (37.1 °C) 69 16 (!) 97/52 100 % 02/04/22 0724 -- -- -- -- 98 %   02/04/22 0430 97.4 °F (36.3 °C) 72 16 129/64 96 %   02/04/22 0245 -- 66 15 (!) 117/52 98 %   02/04/22 0104 -- -- -- -- 97 %   02/04/22 0102 -- 62 -- -- --   02/04/22 0100 -- -- 17 (!) 135/50 --   02/04/22 0000 -- 65 14 (!) 126/55 96 %   02/03/22 2335 -- -- -- -- (!) 87 %   02/03/22 2249 98.7 °F (37.1 °C) 76 16 (!) 140/103 95 %     Oxygen Therapy  O2 Sat (%): 97 % (02/04/22 1410)  Pulse via Oximetry: 77 beats per minute (02/04/22 1405)  O2 Device: Nasal cannula (02/04/22 1231)  O2 Flow Rate (L/min): 3 l/min (02/04/22 1231)    Estimated body mass index is 22.81 kg/m² as calculated from the following:    Height as of this encounter: 5' 8\" (1.727 m). Weight as of this encounter: 68 kg (150 lb). Intake/Output Summary (Last 24 hours) at 2/4/2022 1532  Last data filed at 2/4/2022 1150  Gross per 24 hour   Intake 900 ml   Output 810 ml   Net 90 ml         Physical Exam:   Blood pressure (!) 93/40, pulse 73, temperature 97.6 °F (36.4 °C), resp. rate 15, height 5' 8\" (1.727 m), weight 68 kg (150 lb), SpO2 97 %. General:    Well nourished. No overt distress  Head:  Normocephalic, atraumatic  Eyes:  Sclerae appear normal.  Pupils equally round. ENT:  Nares appear normal, no drainage. Moist oral mucosa  Neck:  No restricted ROM. Trachea midline   CV:   RRR. No m/r/g. No jugular venous distension. Lungs:   CTAB. No wheezing, rhonchi, or rales. Respirations even, unlabored  Abdomen: Bowel sounds present. Soft, nontender, nondistended. Extremities: No cyanosis or clubbing. No edema  Skin:     No rashes and normal coloration. Warm and dry. Neuro:  CN II-XII grossly intact. Sensation intact. A&Ox3  Psych:  Normal mood and affect.       I have reviewed ordered lab tests and independently visualized imaging below:    Recent Labs:  Recent Results (from the past 48 hour(s))   CBC W/O DIFF    Collection Time: 02/03/22 11:00 PM   Result Value Ref Range    WBC 7.2 4.3 - 11.1 K/uL    RBC 3.88 (L) 4.05 - 5.2 M/uL    HGB 11.5 (L) 11.7 - 15.4 g/dL    HCT 35.8 35.8 - 46.3 %    MCV 92.3 79.6 - 97.8 FL    MCH 29.6 26.1 - 32.9 PG    MCHC 32.1 31.4 - 35.0 g/dL    RDW 14.1 11.9 - 14.6 %    PLATELET 690 475 - 670 K/uL    MPV 9.7 9.4 - 12.3 FL    ABSOLUTE NRBC 0.00 0.0 - 0.2 K/uL   METABOLIC PANEL, COMPREHENSIVE    Collection Time: 02/03/22 11:00 PM   Result Value Ref Range    Sodium 140 136 - 145 mmol/L    Potassium 3.6 3.5 - 5.1 mmol/L    Chloride 105 98 - 107 mmol/L    CO2 28 21 - 32 mmol/L    Anion gap 7 7 - 16 mmol/L    Glucose 97 65 - 100 mg/dL    BUN 15 8 - 23 MG/DL    Creatinine 0.90 0.6 - 1.0 MG/DL    GFR est AA >60 >60 ml/min/1.73m2    GFR est non-AA >60 >60 ml/min/1.73m2    Calcium 8.9 8.3 - 10.4 MG/DL    Bilirubin, total 0.3 0.2 - 1.1 MG/DL    ALT (SGPT) 26 12 - 65 U/L    AST (SGOT) 29 15 - 37 U/L    Alk.  phosphatase 79 50 - 136 U/L    Protein, total 6.6 6.3 - 8.2 g/dL    Albumin 3.2 3.2 - 4.6 g/dL    Globulin 3.4 2.3 - 3.5 g/dL    A-G Ratio 0.9 (L) 1.2 - 3.5     TROPONIN-HIGH SENSITIVITY    Collection Time: 02/03/22 11:00 PM   Result Value Ref Range    Troponin-High Sensitivity 22.2 (H) 0 - 14 pg/mL   COVID-19 RAPID TEST    Collection Time: 02/03/22 11:43 PM   Result Value Ref Range    Specimen source NASAL      COVID-19 rapid test Not detected NOTD     EKG, 12 LEAD, INITIAL    Collection Time: 02/03/22 11:47 PM   Result Value Ref Range    Ventricular Rate 67 BPM    Atrial Rate 67 BPM    P-R Interval 172 ms    QRS Duration 91 ms    Q-T Interval 383 ms    QTC Calculation (Bezet) 405 ms    Calculated P Axis 86 degrees    Calculated R Axis 75 degrees    Diagnosis       Sinus rhythm  EDUARDO, consider biatrial enlargement  Anteroseptal infarct, old  Repol abnrm, severe global ischemia (LM/MVD)     TROPONIN-HIGH SENSITIVITY    Collection Time: 02/04/22  1:53 AM   Result Value Ref Range    Troponin-High Sensitivity 42.3 (H) 0 - 14 pg/mL   MSSA/MRSA SC BY PCR, NASAL SWAB    Collection Time: 02/04/22  3:15 AM    Specimen: Nasal swab   Result Value Ref Range    Special Requests: NO SPECIAL REQUESTS      Culture result:        SA target not detected. A MRSA NEGATIVE, SA NEGATIVE test result does not preclude MRSA or SA nasal colonization. TYPE & SCREEN    Collection Time: 02/04/22  6:14 AM   Result Value Ref Range    Crossmatch Expiration 02/07/2022,2359     ABO/Rh(D) A NEGATIVE     Antibody screen NEG        All Micro Results     Procedure Component Value Units Date/Time    MSSA/MRSA SC BY PCR, NASAL SWAB [[id number]] Collected: 02/04/22 0315    Order Status: Completed Specimen: Nasal swab Updated: 02/04/22 0433     Special Requests: NO SPECIAL REQUESTS        Culture result:       SA target not detected. A MRSA NEGATIVE, SA NEGATIVE test result does not preclude MRSA or SA nasal colonization. COVID-19 RAPID TEST [[id number]] Collected: 02/03/22 2343    Order Status: Completed Specimen: Nasopharyngeal Updated: 02/04/22 0037     Specimen source NASAL        COVID-19 rapid test Not detected        Comment:      The specimen is NEGATIVE for SARS-CoV-2, the novel coronavirus associated with COVID-19. A negative result does not rule out COVID-19. This test has been authorized by the FDA under an Emergency Use Authorization (EUA) for use by authorized laboratories. Fact sheet for Healthcare Providers: ConventionUpdate.co.nz  Fact sheet for Patients: ConventionUpdate.co.nz       Methodology: Isothermal Nucleic Acid Amplification               Other Studies:  XR CHEST SNGL V    Result Date: 2/3/2022  EXAM: Chest x-ray. INDICATION: Presurgical evaluation. Hip fracture. COMPARISON: Prior CT chest on October 8, 2021. TECHNIQUE: 2 frontal views of the chest were obtained. FINDINGS: Again noted are emphysematous changes in the lungs with stable scarring or nodularity in the left midlung.  No acute infiltrate is seen. The heart size, mediastinal contour and pulmonary vasculature are normal. There are coronary artery calcifications or stents. No pneumothorax, pleural effusion or acute displaced fracture is seen. 1. No acute process. 2. Emphysema and coronary artery disease. XR PELV AP ONLY    Result Date: 2/4/2022  SINGLE VIEW PORTABLE PELVIS, February 4, 2022 at 1232 hours: CLINICAL HISTORY:  Followup right hip repair today. COMPARISON:  Intraoperative images today and injury yesterday. FINDINGS:  AP radiograph of the lower pelvis and hips demonstrates right lateral skin staples and soft tissue gas. Intramedullary nathaniel and compression screw transfix the right intertrochanteric fracture. Multiple fragments are in adequate alignment in the AP projection. No new fracture, malalignment, or aggressive bone destruction is evident. RIGHT INTERTROCHANTERIC FRACTURE STATUS POST ORIF. XR HIP RT W OR WO PELV 2-3 VWS    Result Date: 2/3/2022  EXAM: Pelvis and right hip x-rays. INDICATION: Pain, injury. COMPARISON: None. TECHNIQUE: A frontal view of the pelvis was supplemented with 2 dedicated views of the right hip joint. FINDINGS: There is an impacted right femur intertrochanteric fracture. No other fractures are identified. The pelvic ring is intact. There is moderate right and mild left hip joint osteoarthritis. Right femur intertrochanteric fracture. XR FEMUR RT 2 VS    Result Date: 2/4/2022  4 VIEW PORTABLE RIGHT FEMUR[de-identified] CLINICAL HISTORY:  Intraoperative evaluation. TECHNIQUE:  Examination was monitored in the OR by Dr. Yareli Blanchard. Fluoroscopy time was 32 seconds with 4 spot images presented. COMPARISON:  Right hip and femur yesterday. Sukh Allison FINDINGS:  These images are presented for review at 1250 hours. They demonstrate placement of an intramedullary nathaniel and compression screw transfixing the intertrochanteric fracture. Principal fragments are in adequate alignment.  Fine bony detail is not rendered on these portable images. INTERTROCHANTERIC FRACTURE STATUS POST ORIF. XR FEMUR RT 2 VS    Result Date: 2/3/2022  EXAM: Right femur x-rays. INDICATION: Pain, injury. COMPARISON: Subsequent dedicated right hip x-rays. TECHNIQUE: 4 views. FINDINGS: As noted on hip x-ray report, there is an impacted right femur intertrochanteric fracture. No fractures are seen in the more distal right femur. Vascular calcifications are noted in the soft tissues. As above.        Current Meds:  Current Facility-Administered Medications   Medication Dose Route Frequency    tuberculin injection 5 Units  5 Units IntraDERMal ONCE    aspirin delayed-release tablet 81 mg  81 mg Oral DAILY    budesonide-formoteroL (SYMBICORT) 160-4.5 mcg/actuation HFA inhaler 2 Puff  2 Puff Inhalation BID RT    metoprolol succinate (TOPROL-XL) XL tablet 100 mg  100 mg Oral DAILY    pantoprazole (PROTONIX) tablet 40 mg  40 mg Oral BID    lisinopriL (PRINIVIL, ZESTRIL) tablet 5 mg  5 mg Oral DAILY    tiotropium bromide (SPIRIVA RESPIMAT) 2.5 mcg /actuation  2 Puff Inhalation DAILY    torsemide (DEMADEX) tablet 10 mg  10 mg Oral DAILY    predniSONE (DELTASONE) tablet 10 mg  10 mg Oral DAILY WITH BREAKFAST    alcohol 62% (NOZIN) nasal  1 Ampule  1 Ampule Topical Q12H    0.9% sodium chloride infusion  100 mL/hr IntraVENous CONTINUOUS    sodium chloride (NS) flush 5-40 mL  5-40 mL IntraVENous Q8H    sodium chloride (NS) flush 5-40 mL  5-40 mL IntraVENous PRN    [START ON 2/5/2022] acetaminophen (TYLENOL) tablet 1,000 mg  1,000 mg Oral Q6H    oxyCODONE IR (ROXICODONE) tablet 10 mg  10 mg Oral Q4H PRN    naloxone (NARCAN) injection 0.2-0.4 mg  0.2-0.4 mg IntraVENous Q10MIN PRN    [START ON 2/5/2022] senna-docusate (PERICOLACE) 8.6-50 mg per tablet 2 Tablet  2 Tablet Oral DAILY    [START ON 2/5/2022] dexamethasone (DECADRON) 10 mg/mL injection 10 mg  10 mg IntraVENous ONCE    ondansetron (ZOFRAN) injection 4 mg  4 mg IntraVENous Q4H PRN       Signed:  Jacob Contreras MD    Part of this note may have been written by using a voice dictation software. The note has been proof read but may still contain some grammatical/other typographical errors.

## 2022-02-04 NOTE — ED PROVIDER NOTES
Patient bent over and lost her balance and ended up falling backwards and ending up with a severe right hip and thigh pain. She was unable to ambulate. EMS has brought her for evaluation and is given her a total of 10 of morphine prior to arrival.  Patient denies headache head injury neck pain or back pain. Isolated injury to right hip and thigh area. The history is provided by the patient. Hip Injury   This is a new problem. The current episode started less than 1 hour ago. The problem occurs constantly. The problem has not changed since onset. The pain is present in the right hip. The pain is severe. Pertinent negatives include no numbness, no back pain and no neck pain. The symptoms are aggravated by contact and movement. She has tried nothing for the symptoms. There has been a history of trauma.         Past Medical History:   Diagnosis Date    Arrhythmia     Arthritis     CAD (coronary artery disease) 2009, 8/19/2013    PCI,  Meera Roberts     Carotid stenosis, right     endarterectomy 11/25/19    Chronic anxiety 4/27/2017    COPD     recent referral to [facility]ay[facility] for lung transplant    Emphysema lung ([facility] [address]     GERD (gastroesophageal reflux disease)     controlled with medication    History of echocardiogram 06/07/2019    History of echocardiogram     echo 06/07/19 LVEF 55-60%    Hypertension     Nausea & vomiting     Oxygen dependent     1 liter at night    Pleurisy     Thyroid disease     pt denies       Past Surgical History:   Procedure Laterality Date    HX GYN      rebuilt cervix    HX HEART CATHETERIZATION  06/06/2019    last stent- per patient- 10 stents total    HX OTHER SURGICAL      HX TONSIL AND ADENOIDECTOMY      VASCULAR SURGERY PROCEDURE UNLIST Right 11/25/2019    carotid endarterectomy         Family History:   Problem Relation Age of Onset    No Known Problems Mother         adopted       Social History     Socioeconomic History    Marital status:      Spouse name: Not on file    Number of children: Not on file    Years of education: Not on file    Highest education level: Not on file   Occupational History    Occupation: [employer]     Employer: SELF EMPLOYED     Comment: home   Tobacco Use    Smoking status: Former Smoker     Packs/day: 0.75     Years: 53.00     Pack years: 39.75     Types: Cigarettes     Quit date:      Years since quittin.0    Smokeless tobacco: Never Used   Substance and Sexual Activity    Alcohol use: Yes     Comment: rarely    Drug use: No    Sexual activity: Not on file   Other Topics Concern   [address] Service Not Asked    Blood Transfusions Not Asked    Caffeine Concern Not Asked    Occupational Exposure Not Asked    Hobby Hazards Not Asked    Sleep Concern Not Asked    Stress Concern Not Asked    Weight Concern Not Asked    Special Diet Not Asked    Back Care Not Asked    Exercise Not Asked    Bike Helmet Not Asked    [address] Not Asked    Self-Exams Not Asked   Social History Narrative    , has 2 children, 1 step child. 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      Social Determinants of Health     Financial Resource Strain:     Difficulty of Paying Living Expenses: Not on file   Food Insecurity:     Worried About [address] in the Last Year: Not on file    [address] in the Last Year: Not on file   Transportation Needs:     Lack of Transportation (Medical): Not on file    Lack of Transportation (Non-Medical):  Not on file   Physical Activity:     Days of Exercise per Week: Not on file    Minutes of Exercise per Session: Not on file   Stress:     Feeling of Stress : Not on file   Social Connections:     Frequency of Communication with Friends and Family: Not on file    Frequency of Social Gatherings with Friends and Family: Not on file    Attends Confucianism Services: Not on file    Active Member of Clubs or Organizations: Not on file    Attends Club or Organization Meetings: Not on file    Marital Status: Not on file   Intimate Partner Violence:     Fear of Current or Ex-Partner: Not on file    Emotionally Abused: Not on file    Physically Abused: Not on file    Sexually Abused: Not on file   Housing Stability:     Unable to Pay for Housing in the Last Year: Not on file    Number of [place] in the Last Year: Not on file    Unstable Housing in the Last Year: Not on file         ALLERGIES: Promethazine    Review of Systems   Constitutional: Negative for chills and fever. HENT: Negative for congestion and rhinorrhea. Respiratory: Negative for cough and shortness of breath. Cardiovascular: Positive for leg swelling. Negative for chest pain. Gastrointestinal: Negative for abdominal pain, diarrhea, nausea and vomiting. Genitourinary: Negative for dysuria and hematuria. Musculoskeletal: Negative for back pain and neck pain. Neurological: Negative for weakness and numbness. Psychiatric/Behavioral: Negative for confusion. All other systems reviewed and are negative. Vitals:    02/03/22 2249   BP: (!) 140/103   Pulse: 76   Resp: 16   Temp: 98.7 °F (37.1 °C)   SpO2: 95%   Weight: 68 kg (150 lb)   Height: 5' 8\" (1.727 m)            Physical Exam  Vitals and nursing note reviewed. Constitutional:       General: She is not in acute distress. Appearance: She is well-developed. HENT:      Head: Normocephalic. Mouth/Throat:      Mouth: Mucous membranes are dry. Eyes:      Pupils: Pupils are equal, round, and reactive to light. Neck:      Comments: No midline cervical spine tenderness or pain with range of motion  Cardiovascular:      Rate and Rhythm: Normal rate and regular rhythm. Heart sounds: Normal heart sounds. Pulmonary:      Effort: Pulmonary effort is normal.      Breath sounds: Normal breath sounds. Abdominal:      General: There is no distension. Palpations: Abdomen is soft. There is no mass. Tenderness:  There is no abdominal tenderness. There is no guarding or rebound. Musculoskeletal:         General: Tenderness ( Tenderness right proximal anterior thigh and right lateral thigh proximally, no obvious deformity. Neurovascularly intact) present. Normal range of motion. Cervical back: No tenderness. Comments: Pelvis stable and nontender   Lymphadenopathy:      Cervical: No cervical adenopathy. Skin:     General: Skin is warm and dry. Neurological:      Mental Status: She is alert. MDM  Number of Diagnoses or Management Options  Diagnosis management comments: X-rays to evaluate for fracture. Basic labs for now. EKG if fracture found. Patient is vaccinated and boosted for Covid and has no Covid symptoms    11:34 PM  EKG ordered and chest x-ray reviewed as patient does have a right intertrochanteric fracture.   Plan admission to the hospitalist with orthopedic consult       Amount and/or Complexity of Data Reviewed  Clinical lab tests: ordered and reviewed (Results for orders placed or performed during the hospital encounter of 02/03/22  -COVID-19 RAPID TEST:        Result                      Value             Ref Range           Specimen source             NASAL                                 COVID-19 rapid test         Not detected      NOTD           -CBC W/O DIFF:        Result                      Value             Ref Range           WBC                         7.2               4.3 - 11.1 K*       RBC                         3.88 (L)          4.05 - 5.2 M*       HGB                         11.5 (L)          11.7 - 15.4 *       HCT                         35.8              35.8 - 46.3 %       MCV                         92.3              79.6 - 97.8 *       MCH                         29.6              26.1 - 32.9 *       MCHC                        32.1              31.4 - 35.0 *       RDW                         14.1              11.9 - 14.6 %       PLATELET                    221 150 - 450 K/*       MPV                         9.7               9.4 - 12.3 FL       ABSOLUTE NRBC               0.00              0.0 - 0.2 K/*  -METABOLIC PANEL, COMPREHENSIVE:        Result                      Value             Ref Range           Sodium                      140               136 - 145 mm*       Potassium                   3.6               3.5 - 5.1 mm*       Chloride                    105               98 - 107 mmo*       CO2                         28                21 - 32 mmol*       Anion gap                   7                 7 - 16 mmol/L       Glucose                     97                65 - 100 mg/*       BUN                         15                8 - 23 MG/DL        Creatinine                  0.90              0.6 - 1.0 MG*       GFR est AA                  >60               >60 ml/min/1*       GFR est non-AA              >60               >60 ml/min/1*       Calcium                     8.9               8.3 - 10.4 M*       Bilirubin, total            0.3               0.2 - 1.1 MG*       ALT (SGPT)                  26                12 - 65 U/L         AST (SGOT)                  29                15 - 37 U/L         Alk. phosphatase            79                50 - 136 U/L        Protein, total              6.6               6.3 - 8.2 g/*       Albumin                     3.2               3.2 - 4.6 g/*       Globulin                    3.4               2.3 - 3.5 g/*       A-G Ratio                   0.9 (L)           1.2 - 3.5      -TROPONIN-HIGH SENSITIVITY:        Result                      Value             Ref Range           Troponin-High Sensitiv*     22.2 (H)          0 - 14 pg/mL   )  Tests in the radiology section of CPT®: ordered and reviewed (XR CHEST SNGL V    Result Date: 2/3/2022  EXAM: Chest x-ray. INDICATION: Presurgical evaluation. Hip fracture. COMPARISON: Prior CT chest on October 8, 2021. TECHNIQUE: 2 frontal views of the chest were obtained. FINDINGS: Again noted are emphysematous changes in the lungs with stable scarring or nodularity in the left midlung. No acute infiltrate is seen. The heart size, mediastinal contour and pulmonary vasculature are normal. There are coronary artery calcifications or stents. No pneumothorax, pleural effusion or acute displaced fracture is seen. 1. No acute process. 2. Emphysema and coronary artery disease. XR HIP RT W OR WO PELV 2-3 VWS    Result Date: 2/3/2022  EXAM: Pelvis and right hip x-rays. INDICATION: Pain, injury. COMPARISON: None. TECHNIQUE: A frontal view of the pelvis was supplemented with 2 dedicated views of the right hip joint. FINDINGS: There is an impacted right femur intertrochanteric fracture. No other fractures are identified. The pelvic ring is intact. There is moderate right and mild left hip joint osteoarthritis. Right femur intertrochanteric fracture. XR FEMUR RT 2 VS    Result Date: 2/3/2022  EXAM: Right femur x-rays. INDICATION: Pain, injury. COMPARISON: Subsequent dedicated right hip x-rays. TECHNIQUE: 4 views. FINDINGS: As noted on hip x-ray report, there is an impacted right femur intertrochanteric fracture. No fractures are seen in the more distal right femur. Vascular calcifications are noted in the soft tissues.      As above.     )  Tests in the medicine section of CPT®: ordered and reviewed  Independent visualization of images, tracings, or specimens: yes (EKG interpretation in absence of cardiology  EKG shows normal sinus rhythm rate of 67, normal axis, biatrial enlargement, lateral and inferior ST depression which is unchanged from an EKG from 3 September in 2021  Consider cardiac ischemia  Interpreted by ED physician Dr. Sultana Jimenez)    Risk of Complications, Morbidity, and/or Mortality  Presenting problems: moderate  Diagnostic procedures: low  Management options: moderate    Patient Progress  Patient progress: stable         Procedures

## 2022-02-04 NOTE — PROGRESS NOTES
Pt to Pre-op via bed accompanied by transport. Report called to Bonifacio using SBAR out. TRANSFER - OUT REPORT:    Verbal report given to Bonifacio RN(name) on [facility]  being transferred to Pre-op(unit) for routine progression of care       Report consisted of patients Situation, Background, Assessment and   Recommendations(SBAR). Information from the following report(s) Kardex, MAR, Recent Results and Med Rec Status was reviewed with the receiving nurse. Lines:   Peripheral IV 02/03/22 Right Antecubital (Active)   Site Assessment Clean, dry, & intact 02/04/22 1157   Phlebitis Assessment 0 02/04/22 1157   Infiltration Assessment 0 02/04/22 1157   Dressing Status Clean, dry, & intact 02/04/22 1157   Dressing Type Tape;Transparent 02/04/22 1157   Hub Color/Line Status Patent 02/04/22 1157        Opportunity for questions and clarification was provided.       Patient transported with:   O2 @ 2 liters

## 2022-02-04 NOTE — ED NOTES
TRANSFER - OUT REPORT:    Verbal report given to [facility] CHUY RN on [address]  being transferred to 6th floor bed 732 for routine progression of care       Report consisted of patients Situation, Background, Assessment and   Recommendations(SBAR). Information from the following report(s) SBAR and ED Summary was reviewed with the receiving nurse. Lines:   Peripheral IV 02/03/22 Right Antecubital (Active)        Opportunity for questions and clarification was provided.       Patient transported with:   O2 @ 2 liters  Tech

## 2022-02-04 NOTE — PERIOP NOTES
TRANSFER - OUT REPORT:    Verbal report given to Ale RN on [facility] [facility]  being transferred to [phone] for routine post - op       Report consisted of patients Situation, Background, Assessment and   Recommendations(SBAR). Information from the following report(s) SBAR, OR Summary, Procedure Summary, Intake/Output and MAR was reviewed with the receiving nurse. Lines:   Peripheral IV 02/03/22 Right Antecubital (Active)   Site Assessment Clean, dry, & intact 02/04/22 1157   Phlebitis Assessment 0 02/04/22 1157   Infiltration Assessment 0 02/04/22 1157   Dressing Status Clean, dry, & intact 02/04/22 1157   Dressing Type Tape;Transparent 02/04/22 1157   Hub Color/Line Status Patent 02/04/22 1157        Opportunity for questions and clarification was provided. Patient transported with:   O2 @ 3 liters    VTE prophylaxis orders have been written for [address] [facility]. Patient and family given floor number and nurses name. Family updated re: pt status after security code verified.

## 2022-02-04 NOTE — PROGRESS NOTES
visit attempted. Patient went to surgery this morning according to nurse. No emotional/spiriitula needs are known. Chaplains remain available as needed.      Nissa Darnell MDiv, [facility]

## 2022-02-04 NOTE — CONSULTS
Patient was interviewed and examined by me personally. She has a minimally displaced right intertrochanteric hip fracture. I plan a gamma nail insertion. She is aware of the risks and benefits and wishes to proceed. Samreen Rebolledo MD          [facility] [place]/[facility]  Consultation Note    Patient ID:  Name: Tiago Curry  MRN: [id number]  AGE: 66 y.o.  : 1950    Date of Consultation:  2022  Referring Physician: Hospitalist    Subjective: Pt complains of right thigh pain after sustaining a ground-level fall last night attempting to reach her cup of coffee offered table. She has been recovering from a T12 compression fracture that she thinks may have a occurred in November and has been seeing a spine provider at [facility] for this. They presented to the [facility] Emergency Dept last night. They were found to have closed right intertrochanteric hip fracture. They were admitted by the hospitalist. They localizes their pain to the right thigh. They have no other orthopedic concerns at this time. She takes Plavix for heart stents and has seen her cardiologist this morning but her Plavix has been held since yesterday.      Past Medical History Includes:   Past Medical History:   Diagnosis Date    Arrhythmia     Arthritis     CAD (coronary artery disease) , 2013    PCI,  Radha Carmona     Carotid stenosis, right     endarterectomy 19    Chronic anxiety 2017    COPD     recent referral to [address] for lung transplant    Emphysema lung ([facility] [address]     GERD (gastroesophageal reflux disease)     controlled with medication    History of echocardiogram 2019    History of echocardiogram     echo 19 LVEF 55-60%    Hypertension     Nausea & vomiting     Oxygen dependent     1 liter at night    Pleurisy     Thyroid disease     pt denies   ,   Past Surgical History:   Procedure Laterality Date    HX GYN      rebuilt cervix    HX HEART CATHETERIZATION  2019    last stent- per patient- 10 stents total    HX OTHER SURGICAL      HX TONSIL AND ADENOIDECTOMY      VASCULAR SURGERY PROCEDURE UNLIST Right 2019    carotid endarterectomy     Family History:   Family History   Problem Relation Age of Onset    No Known Problems Mother         adopted      Social History:   Social History     Tobacco Use    Smoking status: Former Smoker     Packs/day: 0.75     Years: 53.00     Pack years: 39.75     Types: Cigarettes     Quit date:      Years since quittin.0    Smokeless tobacco: Never Used   Substance Use Topics    Alcohol use: Yes     Comment: rarely       ALLERGIES:   Allergies   Allergen Reactions    Promethazine Nausea and Vomiting and Other (comments)     Severe vomiting         Patient Medications    Current Facility-Administered Medications   Medication Dose Route Frequency    0.9% sodium chloride infusion  75 mL/hr IntraVENous CONTINUOUS    sodium chloride 0.9 % bolus infusion 250 mL  250 mL IntraVENous CONTINUOUS    sodium chloride (NS) flush 5-40 mL  5-40 mL IntraVENous Q8H    sodium chloride (NS) flush 5-40 mL  5-40 mL IntraVENous PRN    acetaminophen (TYLENOL) tablet 650 mg  650 mg Oral Q8H    tuberculin injection 5 Units  5 Units IntraDERMal ONCE    oxyCODONE IR (ROXICODONE) tablet 10 mg  10 mg Oral Q3H PRN    HYDROmorphone (DILAUDID) injection 0.5 mg  0.5 mg IntraVENous Q6H PRN    aspirin delayed-release tablet 81 mg  81 mg Oral DAILY    budesonide-formoteroL (SYMBICORT) 160-4.5 mcg/actuation HFA inhaler 2 Puff  2 Puff Inhalation BID RT    metoprolol succinate (TOPROL-XL) XL tablet 100 mg  100 mg Oral DAILY    pantoprazole (PROTONIX) tablet 40 mg  40 mg Oral BID    lisinopriL (PRINIVIL, ZESTRIL) tablet 5 mg  5 mg Oral DAILY    tiotropium bromide (SPIRIVA RESPIMAT) 2.5 mcg /actuation  2 Puff Inhalation DAILY    torsemide [facility]) tablet 10 mg  10 mg Oral DAILY    predniSONE (DELTASONE) tablet 10 mg  10 mg Oral DAILY WITH BREAKFAST    ondansetron (ZOFRAN) injection 4 mg  4 mg IntraVENous Q4H PRN         Review of Systems:  A comprehensive review of systems was negative except for that written in the HPI. Physical Exam:      General: NAD, Alert, Oriented x 3   Mental Status: Appropriate   Psych: Normal Affect, Normal Mood    HEENT: Normal Cephalic/Atraumatic, PERRL   Lungs: Respirations even and unlabored, Breath Sounds were clear, no respiratory distress   Heart: Regular Rate and Rhythm   Vascular: Distal pulses intact, good capillary refill   Skin: No redness, No Rashes, Skin is dry. No wounds. Musculoskeletal: Guarded right lower extremity. Leg lengths appear equal.  Range of motion not assessed due to fracture. No distal edema.   Lymphatic: No lympahdenopathy, No distal edema   Neuro: No gross deficits   Abdomen: Soft, Non tender, No distension      VITALS:   Patient Vitals for the past 8 hrs:   BP Temp Pulse Resp SpO2   22 0853 (!) 97/52 98.8 °F (37.1 °C) 69 16 100 %   22 0724 -- -- -- -- 98 %   22 0430 129/64 97.4 °F (36.3 °C) 72 16 96 %   22 0245 (!) 117/52 -- 66 15 98 %    , Temp (24hrs), Av.3 °F (36.8 °C), Min:97.4 °F (36.3 °C), Max:98.8 °F (37.1 °C)           Diagnosis   Patient Active Problem List   Diagnosis Code    Personal history of tobacco use Z87.891    History of MI (myocardial infarction) I25.2    COPD, very severe (HCC) J44.9    Hypoxemia R09.02    Hyperlipidemia E78.5    Essential hypertension, benign I10    Coronary artery disease involving native coronary artery of native heart without angina pectoris I25.10    Chronic anxiety F41.9    Ventricular ectopy I49.3    Chest pain R07.9    CAD S/P percutaneous coronary angioplasty I25.10, Z98.61    Hypertensive urgency I16.0    Chronic respiratory failure with hypoxia (HCC) J96.11    Carotid stenosis I65.29    Carotid stenosis, right I65.21    Centrilobular emphysema (HCC) J43.2    Lung nodule R91.1    H/O carotid endarterectomy Z98.890    Right ear pain H92.01    Ischemic heart disease, hx pci, cath/pci last 5/2019 I25.9    Palliative care patient Z51.5    DNR (do not resuscitate) Z66    Acute bilateral low back pain without sciatica M54.50    Closed right hip fracture ([address] S72.001A    Other fracture of right femur, initial encounter for closed fracture [facility]) S72.8X1A          Assessment     Closed right intertrochanteric hip fracture    Medical Decision Making:     X-rays and chart have been reviewed. The patient was discussed with Dr. Cherylene Latus. I discussed treatment option with the patient and briefly explained explained the surgical plan. She is ready to proceed with surgery. Dr. Cherylene Latus will discuss this further with the patient. We will plan for surgery this morning. Keep NPO. Continue to hold blood thinners.                REKHA Flores  2/4/2022,  9:32 AM

## 2022-02-05 LAB
ALBUMIN SERPL-MCNC: 2.6 G/DL (ref 3.2–4.6)
ALBUMIN/GLOB SERPL: 0.8 {RATIO} (ref 1.2–3.5)
ALP SERPL-CCNC: 60 U/L (ref 50–136)
ALT SERPL-CCNC: 18 U/L (ref 12–65)
ANION GAP SERPL CALC-SCNC: 5 MMOL/L (ref 7–16)
AST SERPL-CCNC: 18 U/L (ref 15–37)
BILIRUB SERPL-MCNC: 0.4 MG/DL (ref 0.2–1.1)
BUN SERPL-MCNC: 11 MG/DL (ref 8–23)
CALCIUM SERPL-MCNC: 8.6 MG/DL (ref 8.3–10.4)
CHLORIDE SERPL-SCNC: 106 MMOL/L (ref 98–107)
CO2 SERPL-SCNC: 28 MMOL/L (ref 21–32)
CREAT SERPL-MCNC: 0.8 MG/DL (ref 0.6–1)
ERYTHROCYTE [DISTWIDTH] IN BLOOD BY AUTOMATED COUNT: 14.2 % (ref 11.9–14.6)
GLOBULIN SER CALC-MCNC: 3.3 G/DL (ref 2.3–3.5)
GLUCOSE SERPL-MCNC: 109 MG/DL (ref 65–100)
HCT VFR BLD AUTO: 28.1 % (ref 35.8–46.3)
HGB BLD-MCNC: 8.8 G/DL (ref 11.7–15.4)
MCH RBC QN AUTO: 29 PG (ref 26.1–32.9)
MCHC RBC AUTO-ENTMCNC: 31.3 G/DL (ref 31.4–35)
MCV RBC AUTO: 92.7 FL (ref 79.6–97.8)
MM INDURATION POC: 0 MM (ref 0–5)
NRBC # BLD: 0 K/UL (ref 0–0.2)
PLATELET # BLD AUTO: 172 K/UL (ref 150–450)
PMV BLD AUTO: 9.9 FL (ref 9.4–12.3)
POTASSIUM SERPL-SCNC: 3.2 MMOL/L (ref 3.5–5.1)
PPD POC: NEGATIVE NEGATIVE
PROT SERPL-MCNC: 5.9 G/DL (ref 6.3–8.2)
RBC # BLD AUTO: 3.03 M/UL (ref 4.05–5.2)
SODIUM SERPL-SCNC: 139 MMOL/L (ref 136–145)
WBC # BLD AUTO: 7.6 K/UL (ref 4.3–11.1)

## 2022-02-05 PROCEDURE — 36415 COLL VENOUS BLD VENIPUNCTURE: CPT

## 2022-02-05 PROCEDURE — 74011250637 HC RX REV CODE- 250/637: Performed by: INTERNAL MEDICINE

## 2022-02-05 PROCEDURE — 2709999900 HC NON-CHARGEABLE SUPPLY

## 2022-02-05 PROCEDURE — 99232 SBSQ HOSP IP/OBS MODERATE 35: CPT | Performed by: INTERNAL MEDICINE

## 2022-02-05 PROCEDURE — 77030019905 HC CATH URETH INTMIT MDII -A

## 2022-02-05 PROCEDURE — 51798 US URINE CAPACITY MEASURE: CPT

## 2022-02-05 PROCEDURE — 97535 SELF CARE MNGMENT TRAINING: CPT

## 2022-02-05 PROCEDURE — 74011000250 HC RX REV CODE- 250: Performed by: ORTHOPAEDIC SURGERY

## 2022-02-05 PROCEDURE — 80053 COMPREHEN METABOLIC PANEL: CPT

## 2022-02-05 PROCEDURE — 97161 PT EVAL LOW COMPLEX 20 MIN: CPT

## 2022-02-05 PROCEDURE — 97530 THERAPEUTIC ACTIVITIES: CPT

## 2022-02-05 PROCEDURE — 97112 NEUROMUSCULAR REEDUCATION: CPT

## 2022-02-05 PROCEDURE — 74011250636 HC RX REV CODE- 250/636: Performed by: ORTHOPAEDIC SURGERY

## 2022-02-05 PROCEDURE — 65270000029 HC RM PRIVATE

## 2022-02-05 PROCEDURE — 77010033678 HC OXYGEN DAILY

## 2022-02-05 PROCEDURE — 94760 N-INVAS EAR/PLS OXIMETRY 1: CPT

## 2022-02-05 PROCEDURE — 74011250637 HC RX REV CODE- 250/637: Performed by: ORTHOPAEDIC SURGERY

## 2022-02-05 PROCEDURE — 85027 COMPLETE CBC AUTOMATED: CPT

## 2022-02-05 PROCEDURE — 74011250637 HC RX REV CODE- 250/637: Performed by: HOSPITALIST

## 2022-02-05 PROCEDURE — 94640 AIRWAY INHALATION TREATMENT: CPT

## 2022-02-05 PROCEDURE — 74011636637 HC RX REV CODE- 636/637: Performed by: HOSPITALIST

## 2022-02-05 PROCEDURE — 97165 OT EVAL LOW COMPLEX 30 MIN: CPT

## 2022-02-05 PROCEDURE — 83735 ASSAY OF MAGNESIUM: CPT

## 2022-02-05 RX ORDER — ATORVASTATIN CALCIUM 40 MG/1
40 TABLET, FILM COATED ORAL
Status: DISCONTINUED | OUTPATIENT
Start: 2022-02-05 | End: 2022-02-05

## 2022-02-05 RX ORDER — ATORVASTATIN CALCIUM 80 MG/1
80 TABLET, FILM COATED ORAL
Status: DISCONTINUED | OUTPATIENT
Start: 2022-02-05 | End: 2022-02-10 | Stop reason: HOSPADM

## 2022-02-05 RX ORDER — POTASSIUM CHLORIDE 20 MEQ/1
40 TABLET, EXTENDED RELEASE ORAL
Status: COMPLETED | OUTPATIENT
Start: 2022-02-05 | End: 2022-02-05

## 2022-02-05 RX ORDER — NITROGLYCERIN 0.4 MG/1
0.4 TABLET SUBLINGUAL AS NEEDED
Status: DISCONTINUED | OUTPATIENT
Start: 2022-02-05 | End: 2022-02-10 | Stop reason: HOSPADM

## 2022-02-05 RX ADMIN — BUDESONIDE AND FORMOTEROL FUMARATE DIHYDRATE 2 PUFF: 160; 4.5 AEROSOL RESPIRATORY (INHALATION) at 19:46

## 2022-02-05 RX ADMIN — Medication 1 AMPULE: at 21:20

## 2022-02-05 RX ADMIN — Medication 1 AMPULE: at 10:01

## 2022-02-05 RX ADMIN — NITROGLYCERIN 0.4 MG: 0.4 TABLET SUBLINGUAL at 15:44

## 2022-02-05 RX ADMIN — ACETAMINOPHEN 1000 MG: 500 TABLET, FILM COATED ORAL at 00:38

## 2022-02-05 RX ADMIN — DEXAMETHASONE SODIUM PHOSPHATE 10 MG: 10 INJECTION INTRAMUSCULAR; INTRAVENOUS at 12:20

## 2022-02-05 RX ADMIN — TIOTROPIUM BROMIDE INHALATION SPRAY 2 PUFF: 3.12 SPRAY, METERED RESPIRATORY (INHALATION) at 07:31

## 2022-02-05 RX ADMIN — ACETAMINOPHEN 1000 MG: 500 TABLET, FILM COATED ORAL at 05:04

## 2022-02-05 RX ADMIN — PREDNISONE 10 MG: 10 TABLET ORAL at 10:02

## 2022-02-05 RX ADMIN — ACETAMINOPHEN 1000 MG: 500 TABLET, FILM COATED ORAL at 12:20

## 2022-02-05 RX ADMIN — NITROGLYCERIN 0.4 MG: 0.4 TABLET SUBLINGUAL at 12:53

## 2022-02-05 RX ADMIN — BUDESONIDE AND FORMOTEROL FUMARATE DIHYDRATE 2 PUFF: 160; 4.5 AEROSOL RESPIRATORY (INHALATION) at 07:31

## 2022-02-05 RX ADMIN — PANTOPRAZOLE SODIUM 40 MG: 40 TABLET, DELAYED RELEASE ORAL at 18:04

## 2022-02-05 RX ADMIN — POTASSIUM CHLORIDE 40 MEQ: 20 TABLET, EXTENDED RELEASE ORAL at 12:20

## 2022-02-05 RX ADMIN — OXYCODONE HYDROCHLORIDE 10 MG: 5 TABLET ORAL at 04:01

## 2022-02-05 RX ADMIN — SODIUM CHLORIDE, PRESERVATIVE FREE 10 ML: 5 INJECTION INTRAVENOUS at 13:09

## 2022-02-05 RX ADMIN — ASPIRIN 81 MG: 81 TABLET ORAL at 10:02

## 2022-02-05 RX ADMIN — SODIUM CHLORIDE, PRESERVATIVE FREE 10 ML: 5 INJECTION INTRAVENOUS at 21:21

## 2022-02-05 RX ADMIN — NITROGLYCERIN 0.4 MG: 0.4 TABLET SUBLINGUAL at 18:29

## 2022-02-05 RX ADMIN — PANTOPRAZOLE SODIUM 40 MG: 40 TABLET, DELAYED RELEASE ORAL at 10:01

## 2022-02-05 RX ADMIN — ATORVASTATIN CALCIUM 80 MG: 80 TABLET, FILM COATED ORAL at 21:20

## 2022-02-05 RX ADMIN — SENNOSIDES AND DOCUSATE SODIUM 2 TABLET: 8.6; 5 TABLET ORAL at 10:00

## 2022-02-05 RX ADMIN — ACETAMINOPHEN 1000 MG: 500 TABLET, FILM COATED ORAL at 18:04

## 2022-02-05 NOTE — PROGRESS NOTES
Pt reports relief of nausea at this time, and relief of headache. Report given to oncoming RN, pt to have cano removed. Pt didn't want to have it removed until nausea subsided. Denies pain at this time. Right hip dressing remains clean, dry and intact.

## 2022-02-05 NOTE — PROGRESS NOTES
Pt is a 67 yo female admitted for surgical repair of a right intertroch hip fracture she sustained in a fall. Pt underwent an ORIF/TFN on 2/4/22. PT/OT evals complete with the recommendation for STR at dc. SW attempted to meet with pt to discuss rehab options at dc but she was sleeping. SW did not attempt to awaken the pt. No family at bedside. Per PT, pt is agreeable to rehab at [facility]. Physiatrist consulted to evaluate for admission to [facility]. Dr. Pavan Garcia notified of consult. PPD placed 2/4/22 in the event SNF rehab is needed. CM will request a COVID PCR test to be administered on Sunday for possible dc on Monday. CM following to assist with pt's dc needs. Care Management Interventions  PCP Verified by CM: Yes  Last Visit to PCP: 01/04/22  Mode of Transport at Discharge:  Other (see comment) (family)  Transition of Care Consult (CM Consult): Discharge Planning  Discharge Durable Medical Equipment: No  Physical Therapy Consult: Yes  Occupational Therapy Consult: Yes  Speech Therapy Consult: No  Support Systems: Spouse/Significant Other,Child(raghavendra),Friend/Neighbor  Confirm Follow Up Transport: Family  The Plan for Transition of Care is Related to the Following Treatment Goals : STR to improve pt's strength and functional abilities for a safe transition to home  The Patient and/or Patient Representative was Provided with a Choice of Provider and Agrees with the Discharge Plan?: Yes  Freedom of Choice List was Provided with Basic Dialogue that Supports the Patient's Individualized Plan of Care/Goals, Treatment Preferences and Shares the Quality Data Associated with the Providers?: Yes  Discharge Location  Patient Expects to be Discharged to[de-identified] Unable to determine at this time

## 2022-02-05 NOTE — PROGRESS NOTES
ACUTE PHYSICAL THERAPY GOALS:  (Developed with and agreed upon by patient and/or caregiver.)  1. Pt will perform bed mobility Mod (I) c use of rails PRN in 7 therapy sessions. 2. Pt will perform sit-to-stand/ stand-to-sit transfers Mod (I) c use of LRAD in 7 therapy sessions. 3. Pt will ambulate 100 ft under (S) with use of LRAD and breaks as needed in 7 therapy sessions. 4. Pt will perform standing balance activities with minimal postural sway in 7 therapy sessions. 5. Pt will tolerate multiple sets and reps of BLE exercises in 7 therapy sessions. PHYSICAL THERAPY: Daily Note and PM Treatment Day # 1     WBAT RLE    Shaquille Teixeira is a 66 y.o. female   PRIMARY DIAGNOSIS: Other fracture of right femur, initial encounter for closed fracture ([address]  Other fracture of right femur, initial encounter for closed fracture ([facility] [address] [S72.8X1A]  Procedure(s) (LRB):  RIGHT GAMMA NAIL INSERTION (Right)  1 Day Post-Op    ASSESSMENT:     REHAB RECOMMENDATIONS: CURRENT LEVEL OF FUNCTION:  (Most Recently Demonstrated)   Recommendation to date pending progress:  [address] [place] [address]  Equipment:    Rolling Walker Bed Mobility:   Moderate Assistance  Sit to Stand:   Moderate Assistance  Transfers:   Minimal Assistance  Gait/Mobility:   Minimal Assistance     ASSESSMENT:  Ms. Waldemar Hodgkins was seated in chair on 2 LNC upon entering the room and agreeable to therapy. This PM, pt demonstrates slow progress toward established goals. Pt required Mod (A) for sit-to-supine/ sit-to-stand and Min (A) c use of RW for transfer to bed/ brief 3-4 steps. Upon standing, pt reporting dizziness that was unchanged c inc time in standing; pt able to remain steady within RW and RN present at time of transfer. This session, pt was able to take 3-4 steps although she again elected to use heel-to-toe pivot strategy vs taking actual steps. She remains significantly limited in her ability to tolerate WB on RLE d/t pain.  Pt requiring intermittent Min (A) for AD negotiation when turning to sit on EOB. Pt then (A) back to supine c mod (A) at BLE. At this time, pt remains a good candidate for skilled PT and will continue to benefit from advancing POC. At end of session, pt was positioned to comfort in bed with all needs in reach and alarm activated. RN was made aware of pt performance.         SUBJECTIVE:   Ms. Vanegas Bearded states, \"Thank you\"    SOCIAL HISTORY/ LIVING ENVIRONMENT: See Eval  Home Environment: Private residence  One/Two Story Residence: One story  Living Alone: No  Support Systems: Spouse/Significant Other,Child(raghavendra),Friend/Neighbor  OBJECTIVE:     PAIN: VITAL SIGNS: LINES/DRAINS:   Pre Treatment: Pain Screen  Pain Scale 1: Numeric (0 - 10)  Pain Intensity 1: 0  Pain Onset 1: post op  Pain Location 1: Hip  Pain Orientation 1: Right  Pain Description 1: Aching  Pain Intervention(s) 1: Ambulation/Increased Activity  Post Treatment: 0 Vital Signs  O2 Sat (%): 95 %  O2 Device: Nasal cannula  O2 Flow Rate (L/min): 2 l/min IV  O2 Device: Nasal cannula     MOBILITY: I Mod I S SBA CGA Min Mod Max Total  NT x2 Comments:   Bed Mobility    Rolling [] [] [] [] [] [] [] [] [] [x] []    Supine to Sit [] [] [] [] [] [] [] [] [] [x] []    Scooting [] [] [] [] [] [x] [] [] [] [] []    Sit to Supine [] [] [] [] [] [] [x] [] [] [] []    Transfers    Sit to Stand [] [] [] [] [] [] [x] [] [] [] []    Bed to Chair [] [] [] [] [] [x] [] [] [] [] []    Stand to Sit [] [] [] [] [] [] [x] [] [] [] []    I=Independent, Mod I=Modified Independent, S=Supervision, SBA=Standby Assistance, CGA=Contact Guard Assistance,   Min=Minimal Assistance, Mod=Moderate Assistance, Max=Maximal Assistance, Total=Total Assistance, NT=Not Tested    BALANCE: Good Fair+ Fair Fair- Poor NT Comments   Sitting Static [] [x] [] [] [] []    Sitting Dynamic [] [] [x] [] [] []              Standing Static [] [] [x] [] [] []    Standing Dynamic [] [] [] [x] [] []      GAIT: I Mod I S SBA CGA Min Mod Max Total  NT x2 Comments:   Level of Assistance [] [] [] [] [] [x] [] [] [] [] []    Distance 3-4 steps    DME Rolling Walker    Gait Quality Heel-toe-pivot; slow     Weightbearing  Status WBAT, RLE     I=Independent, Mod I=Modified Independent, S=Supervision, SBA=Standby Assistance, CGA=Contact Guard Assistance,   Min=Minimal Assistance, Mod=Moderate Assistance, Max=Maximal Assistance, Total=Total Assistance, NT=Not Tested    PLAN:   FREQUENCY/DURATION: PT Plan of Care: BID for duration of hospital stay or until stated goals are met, whichever comes first.  TREATMENT:     TREATMENT:   ($$ Therapeutic Activity: 8-22 mins    )  Therapeutic Activity (13 Minutes): Therapeutic activity included Scooting, Transfer Training, Ambulation on level ground, Sitting balance  and Standing balance to improve functional Mobility, Strength, ROM and Activity tolerance.     TREATMENT GRID:  N/A    AFTER TREATMENT POSITION/PRECAUTIONS:  Alarm Activated, Bed, Needs within reach and RN notified    INTERDISCIPLINARY COLLABORATION:  RN/PCT and PT/PTA    TOTAL TREATMENT DURATION:  PT Patient Time In/Time Out  Time In: 1321  Time Out: 220 Marge Cain, PT

## 2022-02-05 NOTE — PROGRESS NOTES
[facility] FRACTURE PROGRESS NOTE    2022  Admit Date:   2/3/2022    Post Op day: 1 Day Post-Op    Subjective:     Ramirez Porter      PT/OT:   Gait:                    Vital Signs:    Patient Vitals for the past 8 hrs:   BP Temp Pulse Resp SpO2   22 0359 (!) 109/49 98.1 °F (36.7 °C) 84 18 97 %   22 0106 -- -- 82 -- --   22 0004 (!) 101/48 -- -- -- --   22 2340 (!) 88/53 98.3 °F (36.8 °C) 84 -- 97 %     Temp (24hrs), Av.3 °F (36.8 °C), Min:97.6 °F (36.4 °C), Max:98.8 °F (37.1 °C)      Pain Control:   Pain Assessment  Pain Scale 1: FLACC  Pain Intensity 1: 0  Pain Onset 1: post op  Pain Location 1: Hip  Pain Orientation 1: Right  Pain Description 1: Constant  Pain Intervention(s) 1: Medication (see MAR)    Meds:    Current Facility-Administered Medications   Medication Dose Route Frequency    aspirin delayed-release tablet 81 mg  81 mg Oral DAILY    budesonide-formoteroL (SYMBICORT) 160-4.5 mcg/actuation HFA inhaler 2 Puff  2 Puff Inhalation BID RT    metoprolol succinate (TOPROL-XL) XL tablet 100 mg  100 mg Oral DAILY    pantoprazole (PROTONIX) tablet 40 mg  40 mg Oral BID    lisinopriL (PRINIVIL, ZESTRIL) tablet 5 mg  5 mg Oral DAILY    tiotropium bromide (SPIRIVA RESPIMAT) 2.5 mcg /actuation  2 Puff Inhalation DAILY    torsemide (DEMADEX) tablet 10 mg  10 mg Oral DAILY    predniSONE (DELTASONE) tablet 10 mg  10 mg Oral DAILY WITH BREAKFAST    alcohol 62% (NOZIN) nasal  1 Ampule  1 Ampule Topical Q12H    0.9% sodium chloride infusion  100 mL/hr IntraVENous CONTINUOUS    sodium chloride (NS) flush 5-40 mL  5-40 mL IntraVENous Q8H    sodium chloride (NS) flush 5-40 mL  5-40 mL IntraVENous PRN    acetaminophen (TYLENOL) tablet 1,000 mg  1,000 mg Oral Q6H    oxyCODONE IR (ROXICODONE) tablet 10 mg  10 mg Oral Q4H PRN    naloxone (NARCAN) injection 0.2-0.4 mg  0.2-0.4 mg IntraVENous Q10MIN PRN    senna-docusate (PERICOLACE) 8.6-50 mg per tablet 2 Tablet 2 Tablet Oral DAILY    dexamethasone (DECADRON) 10 mg/mL injection 10 mg  10 mg IntraVENous ONCE    ondansetron (ZOFRAN) injection 4 mg  4 mg IntraVENous Q4H PRN       LAB:    Recent Labs     02/05/22  0453   HCT 28.1*   HGB 8.8*       24 Hour Assessment Issues:    Oriented    Discharge Planning: SNF    Transfuse PRBC's:      Assessment & Physician's Comment:  Neurovascular checks within normal limits    Principal Problem:    Other fracture of right femur, initial encounter for closed fracture ([address] (2/4/2022)    Active Problems:    COPD, very severe ([address] (8/20/2013)      Hyperlipidemia (10/10/2016)      Essential hypertension, benign (10/10/2016)      CAD S/P percutaneous coronary angioplasty (5/31/2019)      Closed right hip fracture ([address] (2/4/2022)        Plan:  PT today as tolerated    Raina Bailey MD

## 2022-02-05 NOTE — PROGRESS NOTES
ACUTE OT GOALS:  (Developed with and agreed upon by patient and/or caregiver.)  1. Patient will complete upper body bathing and dressing with SET UP and adaptive equipment as needed. 2. Patient will complete lower body bathing and dressing with MIN A and adaptive equipment as needed. 2. Patient will complete toileting with MIN A.   3. Patient will complete grooming ADL standing at sink with CGA. 4. Patient will tolerate 25 minutes of OT treatment with 1-2 rest breaks to increase activity tolerance for ADLs. 5. Patient will complete functional transfers with SBA and adaptive equipment as needed. 6. Patient will tolerate 10 minutes BUE exercises to increase strength for safe, functional transfers.      Timeframe: 7 visits   WBAT RLE  OCCUPATIONAL THERAPY ASSESSMENT: Initial Assessment and Daily Note OT Treatment Day # 1    Johanna Ferrer is a 66 y.o. female   PRIMARY DIAGNOSIS: Other fracture of right femur, initial encounter for closed fracture ([facility]  Other fracture of right femur, initial encounter for closed fracture ([facility] [address] [S72.8X1A]  Procedure(s) (LRB):  RIGHT GAMMA NAIL INSERTION (Right)  1 Day Post-Op  Reason for Referral:    ICD-10: Treatment Diagnosis: Pain in Right Hip (M25.551)  Stiffness of Right Hip, Not elsewhere classified (M25.651)  Difficulty in walking, Not elsewhere classified (R26.2)  Other abnormalities of gait and mobility (R26.89)  History of falling (Z91.81)  INPATIENT: [place] / [place] / Product Type: Medicare /   ASSESSMENT:     REHAB RECOMMENDATIONS:   Recommendation to date pending progress:  [address] [place] [address]  Equipment:    3 in 1 Bedside Commode   Rolling Walker     PRIOR LEVEL OF FUNCTION:  (Prior to Hospitalization)  INITIAL/CURRENT LEVEL OF FUNCTION:  (Based on today's evaluation)   Bathing:   Modified Independent  Dressing:   Independent  Feeding/Grooming:   Independent  Toileting:   Independent  Functional Mobility:   Independent Bathing:   Moderate Assistance  Dressing:   Moderate Assistance  Feeding/Grooming:   Minimal Assistance  Toileting:   Maximal Assistance  Functional Mobility:   Minimal Assistance x 2     ASSESSMENT:  Ms. Romina Morrissey is a 65 y/o female presents with R hip fx after a fall and is POD 1 R gamma nail insertion. Pt is WBAT RLE. Today pt presents with decreased activity tolerance, balance and mobility impacting ADLs. Pt had previous T12 fx prior to this hospital admission and still c/o back pain today. Pt overall min A x2 RW and increased time for functional transfers in prep for ADLs. Pt max A to don socks and brief EOB and in standing. Pt is currently limited by pain and fear of putting weight through RLE Pt requires max encouragement for every step and movement taken while in standing with RW. Pt is currently functioning below baseline and would benefit from skilled OT services to address OT goals and plan of care. Rec STR at d/c.      SUBJECTIVE:   Ms. Romina Morrissey states, \"Give me time\"    SOCIAL HISTORY/LIVING ENVIRONMENT: lives with friend, one level home, 1 KADY, 1 recent fall that brought her here, walk in shower w/ [place], drives, has [facility] at home but does not use it  Home Environment: Private residence  One/Two Story Residence: One story  Living Alone: No  Support Systems: Spouse/Significant Other,Child(raghavendra),Friend/Neighbor    OBJECTIVE:     PAIN: VITAL SIGNS: LINES/DRAINS:   Pre Treatment: Pain Screen  Pain Scale 1: Numeric (0 - 10)  Pain Intensity 1: 5  Pain Onset 1: post op  Pain Location 1: Hip  Pain Orientation 1: Right  Pain Description 1: Aching  Pain Intervention(s) 1: Repositioned; Ambulation/Increased Activity  Post Treatment: 0 Vital Signs  O2 Sat (%): 90 %  O2 Device: None (Room air) IV  O2 Device: None (Room air)     GROSS EVALUATION:  BUE Within Functional Limits Abnormal/ Functional Abnormal/ Non-Functional (see comments) Not Tested Comments:   AROM [x] [] [] []    PROM [] [] [] [x]    Strength [x] [] [] []    Balance [] [x] [] [] Sitting: fair+  Standing: fair   Posture [x] [] [] []    Sensation [x] [] [] []    Coordination [x] [] [] []    Tone [] [] [] [x]    Edema [] [] [] [x]    Activity Tolerance [] [x] [] []     [] [] [] []      COGNITION/  PERCEPTION: Intact Impaired   (see comments) Comments:   Orientation [x] []    Vision [x] []    Hearing [x] []    Judgment/ Insight [x] []    Attention [x] []    Memory [x] []    Command Following [x] []    Emotional Regulation [x] []     [] []      ACTIVITIES OF DAILY LIVING: I Mod I S SBA CGA Min Mod Max Total NT Comments   BASIC ADLs:              Bathing/ Showering [] [] [] [] [] [] [] [] [] [x]    Toileting [] [] [] [] [] [] [] [] [] [x]    Dressing [] [] [] [] [] [] [] [x] [] [] Donning socks and brief   Feeding [] [] [] [] [] [] [] [] [] [x]    Grooming [] [] [] [] [] [] [] [] [] [x]    Personal Device Care [] [] [] [] [] [] [] [] [] [x]    Functional Mobility [] [] [] [] [] [x] [] [] [] [] x2 RW and increased time   I=Independent, Mod I=Modified Independent, S=Supervision, SBA=Standby Assistance, CGA=Contact Guard Assistance,   Min=Minimal Assistance, Mod=Moderate Assistance, Max=Maximal Assistance, Total=Total Assistance, NT=Not Tested    MOBILITY: I Mod I S SBA CGA Min Mod Max Total  NT x2 Comments:   Supine to sit [] [] [] [] [] [] [] [x] [] [] [x]    Sit to supine [] [] [] [] [] [] [] [] [] [x] []    Sit to stand [] [] [] [] [] [x] [] [] [] [] [x] RW and increased time   Bed to chair [] [] [] [] [] [x] [] [] [] [] [x] RW and increased time   I=Independent, Mod I=Modified Independent, S=Supervision, SBA=Standby Assistance, CGA=Contact Guard Assistance,   Min=Minimal Assistance, Mod=Moderate Assistance, Max=Maximal Assistance, Total=Total Assistance, NT=Not Tested    [address] [facility] [address] [address] AM-PAC 6 Clicks   Daily Activity Inpatient Short Form        How much help from another person does the patient currently need. ..  Total A Lot A Little None   1. Putting on and taking off regular lower body clothing? [] 1   [x] 2   [] 3   [] 4   2. Bathing (including washing, rinsing, drying)? [] 1   [x] 2   [] 3   [] 4   3. Toileting, which includes using toilet, bedpan or urinal?   [] 1   [x] 2   [] 3   [] 4   4. Putting on and taking off regular upper body clothing? [] 1   [] 2   [x] 3   [] 4   5. Taking care of personal grooming such as brushing teeth? [] 1   [] 2   [x] 3   [] 4   6. Eating meals? [] 1   [] 2   [] 3   [x] 4   © 2007, Trustees of [address] [place] [address] [address] [address], under license to [employer]. All rights reserved     Score:  Initial: 16 Most Recent: X (Date: -- )   Interpretation of Tool:  Represents activities that are increasingly more difficult (i.e. Bed mobility, Transfers, Gait). PLAN:   FREQUENCY/DURATION: OT Plan of Care: 5 times/week for duration of hospital stay or until stated goals are met, whichever comes first.    PROBLEM LIST:   (Skilled intervention is medically necessary to address:)  1. Decreased ADL/Functional Activities  2. Decreased Activity Tolerance  3. Decreased Balance  4. Decreased Gait Ability  5. Decreased Transfer Abilities  6. Increased Pain   INTERVENTIONS PLANNED:   (Benefits and precautions of occupational therapy have been discussed with the patient.)  1. Self Care Training  2. Therapeutic Activity  3. Therapeutic Exercise/HEP  4. Neuromuscular Re-education  5.  Education     TREATMENT:     EVALUATION: Low Complexity : (Untimed Charge)    TREATMENT:   ($$ Self Care/Home Management: 8-22 mins$$ Neuromuscular Re-Education: 8-22 mins   )  Co-Treatment PT/OT necessary due to patient's decreased overall endurance/tolerance levels, as well as need for high level skilled assistance to complete functional transfers/mobility and functional tasks  Self Care (10 Minutes): Self care including Lower Body Dressing, ADL Adaptive Equipment Training, Energy Conservation Training and functional transfers in prep for ADLs to increase independence and decrease level of assistance required. Neuromuscular Re-education (13 Minutes): Neuromuscular Re-education included Balance Training, Functional mobility with facilitation, Postural training, Sitting balance training and Standing balance training to improve Balance, Functional Mobility and Postural Control.     TREATMENT GRID:  N/A    AFTER TREATMENT POSITION/PRECAUTIONS:  Alarm Activated, Chair, Needs within reach and RN notified    INTERDISCIPLINARY COLLABORATION:  RN/PCT, PT/PTA and OT/YOUNG    TOTAL TREATMENT DURATION:  OT Patient Time In/Time Out  Time In: 1117  Time Out: [facility], OT

## 2022-02-05 NOTE — PROGRESS NOTES
[facility] CARDIOLOGY PROGRESS NOTE           2/5/2022 11:39 AM    Admit Date: 2/3/2022      Subjective:     No chest discomfort. Underwent right hip fracture surgery on 7/3/3215 with no complications. ROS:  Cardiovascular:  As noted above    Objective:      Vitals:    02/05/22 0803 02/05/22 0906 02/05/22 0907 02/05/22 1139   BP: (!) 90/45   (!) 92/51   Pulse: 77   90   Resp: 18   18   Temp: 98.2 °F (36.8 °C)   97.7 °F (36.5 °C)   SpO2: 93% 90% 90% 93%   Weight:       Height:           Physical Exam:  General-No Acute Distress  Neck- supple, no JVD  CV- regular rate and rhythm no MRG  Lung- clear bilaterally  Abd- soft, nontender, nondistended  Ext- no edema bilaterally. Skin- warm and dry    Data Review:   Recent Labs     02/05/22  0453 02/04/22 2025 02/03/22  2300 02/03/22  2300     --   --  140   K 3.2*  --   --  3.6   BUN 11  --   --  15   CREA 0.80  --   --  0.90   *  --   --  97   WBC 7.6  --   --  7.2   HGB 8.8* 9.7*   < > 11.5*   HCT 28.1*  --   --  35.8     --   --  221    < > = values in this interval not displayed. Assessment/Plan:     Principal Problem:    Other fracture of right femur, initial encounter for closed fracture ([facility] [address] (2/4/2022)  -Management per orthopedic surgery    Active Problems:    COPD, very severe ([address] (8/20/2013)  -Per primary      Hyperlipidemia (10/10/2016)  -resume statin therapy      Essential hypertension, benign (10/10/2016)  -controlled        Hypokalemia  -Replete      CAD S/P percutaneous coronary angioplasty (5/31/2019)  -Noted PCI to the RCA from 5/2019; chronically occluded left circumflex with collaterals and patent LAD. -Continue aspirin. Okay to hold off restarting Plavix with PCI from over 1 yr ago  -Resume high intensity statin; appears normally on Lipitor 80 mg daily at home    Not much further to add from a cardiac standpoint.   Will sign off      Tip Rasmussen MD  2/5/2022 11:39 AM

## 2022-02-05 NOTE — PROGRESS NOTES
Problem: Pressure Injury - Risk of  Goal: *Prevention of pressure injury  Description: Document Bobby Scale and appropriate interventions in the flowsheet. Outcome: Progressing Towards Goal  Note: Pressure Injury Interventions:             Activity Interventions: Assess need for specialty bed    Mobility Interventions: Assess need for specialty bed    Nutrition Interventions: Document food/fluid/supplement intake,Offer support with meals,snacks and hydration    Friction and Shear Interventions: HOB 30 degrees or less,Sit at 90-degree angle,Foam dressings/transparent film/skin sealants                Problem: Patient Education: Go to Patient Education Activity  Goal: Patient/Family Education  Outcome: Progressing Towards Goal

## 2022-02-05 NOTE — PROGRESS NOTES
Hospitalist Progress Note   Admit Date:  2/3/2022 10:44 PM   Name:  Dolores Loza   Age:  66 y.o. Sex:  female  :  1950   MRN:  [id number]   Room:  2/    Presenting Complaint: Hip Injury    Reason(s) for Admission: Other fracture of right femur, initial encounter for closed fracture [facility]) [address] Interval History:   Dolores Loza is a 66 y.o. female with medical history of oxygen and steroid dependent COPD, CAD s/p PCI in 2019 (stable cardiac status since that time),  HTN, who presented to ED after she bent over and lost her balance. She ended up falling backwards, hitting her hip. Patient has severe right hip and thigh pain.  She was unable to ambulate.  EMS has brought her for evaluation and is given her a total of 10 of morphine prior to arrival.  Patient denies headache head injury neck pain or back pain.  Isolated injury to right hip and thigh area.   Workup in ER revealed impacted right femur intertrochanteric fracture. Other labs look stable  She denies any other symptoms at this time, specifically denies CP or SOB. NO kidney or liver disease. She does use oxygen especially at night. She is dependent on steroids    Subjective (22): Patient was seen and examined at the bedside. Patient expected to go to the OR today for right femur fracture.     Assessment & Plan:   Other fracture of right femur, initial encounter for closed fracture [facility]) (2022)  2022  Patient is status post surgery on 2022  Further management per orthopedics  Patient being evaluated by PT and OT already  A PPD has been placed  Will consult case management for discharge planning  Resume Plavix postop per Ortho recommendations  Continue remote telemetry due to history of coronary artery disease    COPD  2022  Continue with home oxygen requirements  Continue steroids  Continue nebs  Dr. Raven Fink pulmonologist if needed    Hyperlipidemia  2/5/2022  Continue home medication    Essential hypertension  2/5/2022  Continue home medication    Coronary artery disease status post percutaneous coronary angioplasty  2/5/2022  Plavix on hold, continue aspirin  Reinstate Plavix when okay per Ortho  Continue beta-blocker, ACE, statin  Restart her home as needed nitroglycerin      Dispo/Discharge Planning:    Pending clinical course. PT/OT. Discharge to rehab per the recommendations.     Diet:  ADULT DIET Regular  DVT PPx: Per Ortho  Code status: Full Code    Hospital Problems as of 2/5/2022 Date Reviewed: 11/9/2021          Codes Class Noted - Resolved POA    Closed right hip fracture [facility]) ICD-10-CM: S72.001A  ICD-9-CM: 820.8  2/4/2022 - Present Unknown        * (Principal) Other fracture of right femur, initial encounter for closed fracture [facility]) ICD-10-CM: U74.8Y5F  ICD-9-CM: 821.00  2/4/2022 - Present Unknown        CAD S/P percutaneous coronary angioplasty ICD-10-CM: I25.10, Z98.61  ICD-9-CM: 414.01, V45.82  5/31/2019 - Present Yes        Hyperlipidemia ICD-10-CM: E78.5  ICD-9-CM: 272.4  10/10/2016 - Present Yes        Essential hypertension, benign ICD-10-CM: I10  ICD-9-CM: 401.1  10/10/2016 - Present Yes        COPD, very severe ([facility] [address] ICD-10-CM: J44.9  ICD-9-CM: 501  8/20/2013 - Present Yes              Objective:     Patient Vitals for the past 24 hrs:   Temp Pulse Resp BP SpO2   02/05/22 0733 -- -- -- -- 98 %   02/05/22 0359 98.1 °F (36.7 °C) 84 18 (!) 109/49 97 %   02/05/22 0106 -- 82 -- -- --   02/05/22 0004 -- -- -- (!) 101/48 --   02/04/22 2340 98.3 °F (36.8 °C) 84 -- (!) 88/53 97 %   02/04/22 1943 -- -- -- -- 98 %   02/04/22 1932 98.5 °F (36.9 °C) 80 18 (!) 116/50 97 %   02/04/22 1648 98.2 °F (36.8 °C) 77 (!) 7 (!) 98/50 100 %   02/04/22 1410 -- 73 -- (!) 93/40 97 %   02/04/22 1405 97.6 °F (36.4 °C) 77 15 (!) 84/45 97 %   02/04/22 1255 -- 75 18 (!) 100/49 100 %   02/04/22 1251 -- 77 18 (!) 95/46 100 %   02/04/22 1246 -- 77 18 (!) 102/51 100 %   02/04/22 1241 -- 81 16 (!) 98/50 99 %   02/04/22 1236 -- 74 18 (!) 95/49 99 %   02/04/22 1231 98.7 °F (37.1 °C) 80 18 (!) 96/48 99 %   02/04/22 1226 -- 91 18 (!) 98/50 99 %   02/04/22 1221 -- 87 18 (!) 98/50 99 %   02/04/22 1216 -- 90 16 (!) 100/51 99 %   02/04/22 1211 -- 88 16 (!) 111/53 99 %   02/04/22 1204 -- 90 15 (!) 113/56 97 %   02/04/22 1159 -- 82 15 (!) 112/54 94 %   02/04/22 1157 98.8 °F (37.1 °C) 80 16 (!) 112/54 98 %   02/04/22 0954 98.1 °F (36.7 °C) 65 18 (!) 106/53 99 %   02/04/22 0853 98.8 °F (37.1 °C) 69 16 (!) 97/52 100 %     Oxygen Therapy  O2 Sat (%): 98 % (02/05/22 0733)  Pulse via Oximetry: 84 beats per minute (02/05/22 0733)  O2 Device: None (Room air) (02/05/22 0733)  O2 Flow Rate (L/min): 0 l/min (02/05/22 0733)    Estimated body mass index is 22.81 kg/m² as calculated from the following:    Height as of this encounter: 5' 8\" (1.727 m). Weight as of this encounter: 68 kg (150 lb). Intake/Output Summary (Last 24 hours) at 2/5/2022 0738  Last data filed at 2/4/2022 1907  Gross per 24 hour   Intake 900 ml   Output 1910 ml   Net -1010 ml         Physical Exam:   Blood pressure (!) 109/49, pulse 84, temperature 98.1 °F (36.7 °C), resp. rate 18, height 5' 8\" (1.727 m), weight 68 kg (150 lb), SpO2 98 %. General:    Well nourished. No overt distress  Head:  Normocephalic, atraumatic  Eyes:  Sclerae appear normal.  Pupils equally round. ENT:  Nares appear normal, no drainage. Moist oral mucosa  Neck:  No restricted ROM. Trachea midline   CV:   RRR. No m/r/g. No jugular venous distension. Lungs:   CTAB. No wheezing, rhonchi, or rales. Respirations even, unlabored  Abdomen: Bowel sounds present. Soft, nontender, nondistended. Extremities: No cyanosis or clubbing. No edema  Skin:     No rashes and normal coloration. Warm and dry. Neuro:  CN II-XII grossly intact. Sensation intact. A&Ox3  Psych:  Normal mood and affect.       I have reviewed ordered lab tests and independently visualized imaging below:    Recent Labs:  Recent Results (from the past 48 hour(s))   CBC W/O DIFF    Collection Time: 02/03/22 11:00 PM   Result Value Ref Range    WBC 7.2 4.3 - 11.1 K/uL    RBC 3.88 (L) 4.05 - 5.2 M/uL    HGB 11.5 (L) 11.7 - 15.4 g/dL    HCT 35.8 35.8 - 46.3 %    MCV 92.3 79.6 - 97.8 FL    MCH 29.6 26.1 - 32.9 PG    MCHC 32.1 31.4 - 35.0 g/dL    RDW 14.1 11.9 - 14.6 %    PLATELET 496 811 - 249 K/uL    MPV 9.7 9.4 - 12.3 FL    ABSOLUTE NRBC 0.00 0.0 - 0.2 K/uL   METABOLIC PANEL, COMPREHENSIVE    Collection Time: 02/03/22 11:00 PM   Result Value Ref Range    Sodium 140 136 - 145 mmol/L    Potassium 3.6 3.5 - 5.1 mmol/L    Chloride 105 98 - 107 mmol/L    CO2 28 21 - 32 mmol/L    Anion gap 7 7 - 16 mmol/L    Glucose 97 65 - 100 mg/dL    BUN 15 8 - 23 MG/DL    Creatinine 0.90 0.6 - 1.0 MG/DL    GFR est AA >60 >60 ml/min/1.73m2    GFR est non-AA >60 >60 ml/min/1.73m2    Calcium 8.9 8.3 - 10.4 MG/DL    Bilirubin, total 0.3 0.2 - 1.1 MG/DL    ALT (SGPT) 26 12 - 65 U/L    AST (SGOT) 29 15 - 37 U/L    Alk.  phosphatase 79 50 - 136 U/L    Protein, total 6.6 6.3 - 8.2 g/dL    Albumin 3.2 3.2 - 4.6 g/dL    Globulin 3.4 2.3 - 3.5 g/dL    A-G Ratio 0.9 (L) 1.2 - 3.5     TROPONIN-HIGH SENSITIVITY    Collection Time: 02/03/22 11:00 PM   Result Value Ref Range    Troponin-High Sensitivity 22.2 (H) 0 - 14 pg/mL   COVID-19 RAPID TEST    Collection Time: 02/03/22 11:43 PM   Result Value Ref Range    Specimen source NASAL      COVID-19 rapid test Not detected NOTD     EKG, 12 LEAD, INITIAL    Collection Time: 02/03/22 11:47 PM   Result Value Ref Range    Ventricular Rate 67 BPM    Atrial Rate 67 BPM    P-R Interval 172 ms    QRS Duration 91 ms    Q-T Interval 383 ms    QTC Calculation (Bezet) 405 ms    Calculated P Axis 86 degrees    Calculated R Axis 75 degrees    Diagnosis       Sinus rhythm  EDUARDO, consider biatrial enlargement  Anteroseptal infarct, old  ST depression in inferolateral leads- consider ischemia    Confirmed by Dave Vallecillo MD (), RJ HOOKS ([id number]) on 2/4/2022 4:53:47 PM     TROPONIN-HIGH SENSITIVITY    Collection Time: 02/04/22  1:53 AM   Result Value Ref Range    Troponin-High Sensitivity 42.3 (H) 0 - 14 pg/mL   MSSA/MRSA SC BY PCR, NASAL SWAB    Collection Time: 02/04/22  3:15 AM    Specimen: Nasal swab   Result Value Ref Range    Special Requests: NO SPECIAL REQUESTS      Culture result:        SA target not detected. A MRSA NEGATIVE, SA NEGATIVE test result does not preclude MRSA or SA nasal colonization.    TYPE & SCREEN    Collection Time: 02/04/22  6:14 AM   Result Value Ref Range    Crossmatch Expiration 02/07/2022,2359     ABO/Rh(D) A NEGATIVE     Antibody screen NEG    HEMOGLOBIN    Collection Time: 02/04/22  8:25 PM   Result Value Ref Range    HGB 9.7 (L) 11.7 - 15.4 g/dL   PLEASE READ & DOCUMENT PPD TEST IN 24 HRS    Collection Time: 02/05/22  3:01 AM   Result Value Ref Range    PPD Negative Negative    mm Induration 0 0 - 5 mm   CBC W/O DIFF    Collection Time: 02/05/22  4:53 AM   Result Value Ref Range    WBC 7.6 4.3 - 11.1 K/uL    RBC 3.03 (L) 4.05 - 5.2 M/uL    HGB 8.8 (L) 11.7 - 15.4 g/dL    HCT 28.1 (L) 35.8 - 46.3 %    MCV 92.7 79.6 - 97.8 FL    MCH 29.0 26.1 - 32.9 PG    MCHC 31.3 (L) 31.4 - 35.0 g/dL    RDW 14.2 11.9 - 14.6 %    PLATELET 572 079 - 056 K/uL    MPV 9.9 9.4 - 12.3 FL    ABSOLUTE NRBC 0.00 0.0 - 0.2 K/uL   METABOLIC PANEL, COMPREHENSIVE    Collection Time: 02/05/22  4:53 AM   Result Value Ref Range    Sodium 139 136 - 145 mmol/L    Potassium 3.2 (L) 3.5 - 5.1 mmol/L    Chloride 106 98 - 107 mmol/L    CO2 28 21 - 32 mmol/L    Anion gap 5 (L) 7 - 16 mmol/L    Glucose 109 (H) 65 - 100 mg/dL    BUN 11 8 - 23 MG/DL    Creatinine 0.80 0.6 - 1.0 MG/DL    GFR est AA >60 >60 ml/min/1.73m2    GFR est non-AA >60 >60 ml/min/1.73m2    Calcium 8.6 8.3 - 10.4 MG/DL    Bilirubin, total 0.4 0.2 - 1.1 MG/DL    ALT (SGPT) 18 12 - 65 U/L    AST (SGOT) 18 15 - 37 U/L    Alk. phosphatase 60 50 - 136 U/L    Protein, total 5.9 (L) 6.3 - 8.2 g/dL    Albumin 2.6 (L) 3.2 - 4.6 g/dL    Globulin 3.3 2.3 - 3.5 g/dL    A-G Ratio 0.8 (L) 1.2 - 3.5         All Micro Results     Procedure Component Value Units Date/Time    MSSA/MRSA SC BY PCR, NASAL SWAB [[id number]] Collected: 02/04/22 0315    Order Status: Completed Specimen: Nasal swab Updated: 02/04/22 2778     Special Requests: NO SPECIAL REQUESTS        Culture result:       SA target not detected. A MRSA NEGATIVE, SA NEGATIVE test result does not preclude MRSA or SA nasal colonization. COVID-19 RAPID TEST [[id number]] Collected: 02/03/22 2343    Order Status: Completed Specimen: Nasopharyngeal Updated: 02/04/22 0037     Specimen source NASAL        COVID-19 rapid test Not detected        Comment:      The specimen is NEGATIVE for SARS-CoV-2, the novel coronavirus associated with COVID-19. A negative result does not rule out COVID-19. This test has been authorized by the FDA under an Emergency Use Authorization (EUA) for use by authorized laboratories. Fact sheet for Healthcare Providers: ConventionUpdate.co.nz  Fact sheet for Patients: ConventionUpdate.co.nz       Methodology: Isothermal Nucleic Acid Amplification               Other Studies:  XR PELV AP ONLY    Result Date: 2/4/2022  SINGLE VIEW PORTABLE PELVIS, February 4, 2022 at 1232 hours: CLINICAL HISTORY:  Followup right hip repair today. COMPARISON:  Intraoperative images today and injury yesterday. FINDINGS:  AP radiograph of the lower pelvis and hips demonstrates right lateral skin staples and soft tissue gas. Intramedullary nathaniel and compression screw transfix the right intertrochanteric fracture. Multiple fragments are in adequate alignment in the AP projection. No new fracture, malalignment, or aggressive bone destruction is evident.      RIGHT INTERTROCHANTERIC FRACTURE STATUS POST ORIF. XR FEMUR RT 2 VS    Result Date: 2/4/2022  4 VIEW PORTABLE RIGHT FEMUR[de-identified] CLINICAL HISTORY:  Intraoperative evaluation. TECHNIQUE:  Examination was monitored in the OR by Dr. Nina Sosa. Fluoroscopy time was 32 seconds with 4 spot images presented. COMPARISON:  Right hip and femur yesterday. Colleen Ward FINDINGS:  These images are presented for review at 1250 hours. They demonstrate placement of an intramedullary nathaniel and compression screw transfixing the intertrochanteric fracture. Principal fragments are in adequate alignment. Fine bony detail is not rendered on these portable images. INTERTROCHANTERIC FRACTURE STATUS POST ORIF. NC XR TECHNOLOGIST SERVICE    Result Date: 2/4/2022  Administrative Report Fluoroscopy was provided in the operating room. Images may have been saved as a reference for the surgical procedure. The operative report can be viewed in [address] [facility] and/or retrieved by the Medical Records department.      A Radiologist has not reviewed images associated with this exam.      Current Meds:  Current Facility-Administered Medications   Medication Dose Route Frequency    aspirin delayed-release tablet 81 mg  81 mg Oral DAILY    budesonide-formoteroL (SYMBICORT) 160-4.5 mcg/actuation HFA inhaler 2 Puff  2 Puff Inhalation BID RT    metoprolol succinate (TOPROL-XL) XL tablet 100 mg  100 mg Oral DAILY    pantoprazole (PROTONIX) tablet 40 mg  40 mg Oral BID    lisinopriL (PRINIVIL, ZESTRIL) tablet 5 mg  5 mg Oral DAILY    tiotropium bromide (SPIRIVA RESPIMAT) 2.5 mcg /actuation  2 Puff Inhalation DAILY    torsemide (DEMADEX) tablet 10 mg  10 mg Oral DAILY    predniSONE (DELTASONE) tablet 10 mg  10 mg Oral DAILY WITH BREAKFAST    alcohol 62% (NOZIN) nasal  1 Ampule  1 Ampule Topical Q12H    0.9% sodium chloride infusion  100 mL/hr IntraVENous CONTINUOUS    sodium chloride (NS) flush 5-40 mL  5-40 mL IntraVENous Q8H    sodium chloride (NS) flush 5-40 mL  5-40 mL IntraVENous PRN    acetaminophen (TYLENOL) tablet 1,000 mg  1,000 mg Oral Q6H    oxyCODONE IR (ROXICODONE) tablet 10 mg  10 mg Oral Q4H PRN    naloxone (NARCAN) injection 0.2-0.4 mg  0.2-0.4 mg IntraVENous Q10MIN PRN    senna-docusate (PERICOLACE) 8.6-50 mg per tablet 2 Tablet  2 Tablet Oral DAILY    dexamethasone (DECADRON) 10 mg/mL injection 10 mg  10 mg IntraVENous ONCE    ondansetron (ZOFRAN) injection 4 mg  4 mg IntraVENous Q4H PRN       Signed:  Waqar Dillon MD    Part of this note may have been written by using a voice dictation software. The note has been proof read but may still contain some grammatical/other typographical errors.

## 2022-02-05 NOTE — PROGRESS NOTES
ACUTE PHYSICAL THERAPY GOALS:  (Developed with and agreed upon by patient and/or caregiver.)  1. Pt will perform bed mobility Mod (I) c use of rails PRN in 7 therapy sessions. 2. Pt will perform sit-to-stand/ stand-to-sit transfers Mod (I) c use of LRAD in 7 therapy sessions. 3. Pt will ambulate 100 ft under (S) with use of LRAD and breaks as needed in 7 therapy sessions. 4. Pt will perform standing balance activities with minimal postural sway in 7 therapy sessions. 5. Pt will tolerate multiple sets and reps of BLE exercises in 7 therapy sessions. PHYSICAL THERAPY ASSESSMENT: Initial Assessment and AM PT Treatment Day # 1      Mariana Benson is a 66 y.o. female   PRIMARY DIAGNOSIS: Other fracture of right femur, initial encounter for closed fracture ([facility] [address]  Other fracture of right femur, initial encounter for closed fracture ([facility] [address] [S72.8X1A]  Procedure(s) (LRB):  RIGHT GAMMA NAIL INSERTION (Right)  1 Day Post-Op  Reason for Referral:    ICD-10: Treatment Diagnosis: Pain in Right Hip (M25.551)  Stiffness of Right Hip, Not elsewhere classified (M25.651)  [place]PAT[place]: [place] / [place] / Product Type: Medicare /     ASSESSMENT:     REHAB RECOMMENDATIONS:   Recommendation to date pending progress:  [address] [place] [address]  Equipment:    Rolling Walker     PRIOR LEVEL OF FUNCTION:  (Prior to Hospitalization) INITIAL/CURRENT LEVEL OF FUNCTION:  (Most Recently Demonstrated)   Bed Mobility:   Independent  Sit to Stand:   Independent  Transfers:   Independent  Gait/Mobility:   Independent Bed Mobility:   Moderate Assistance  Sit to Stand:   Moderate Assistance x 2  Transfers:   Minimal Assistance x 2  Gait/Mobility:   Minimal Assistance x 2     ASSESSMENT:  Ms. Priya Gonzales Is a 66 y.o F presenting to PT POD 1 s/p R gamma nail insertion. PTA pt lives with her friend Warner Oneil in a single-level home and she is functionally (I) at baseline for all needs.  At time of initial evaluation, pt presents below baseline LOF with deficits in bed mobility, transfers, balance, gait and activity tolerance limiting her overall functional mobility. Today, pt performed bed mobility with Mod (A) while requiring Mod (A)x2 for sit-to-stand and Min (A)x2 c RW for transfers/ ambulation. Of note, pt moving extremely slow d/t pain in R hip and requires constant encouragement to continue mobility. Pt was intially able to take 2-3 steps within RW but then switched to heel-toe pivot strategy on LLE in order to offload RLE while advancing L foot. Pt showed good ability to use BUE on RW but did require intermittent min (A) in order to properly negotiate RW. Ultimately, chair was moved closer to pt as she was not able to tolerate ambulating the full 5' distance to chair. Pt's gait is slow, antalgic, shuffling and somewhat unsteady; she is primarily limited by pain. At this time, pt is an appropriate candidate for skilled PT and will benefit from POC designed to address the aforementioned deficits. Upon completion of treatment, pt was positioned to comfort in chair with needs in reach josseline alarm activated. RN was made aware of pt performance. DC Recommendation: IRC      SUBJECTIVE:   Ms. Dano Fuller states, \"I'm just being a baby\"    SOCIAL HISTORY/LIVING ENVIRONMENT:  lives with her friend Josiah Oscar in a single-level home and she is functionally (I) at baseline for all needs.  Owns cane  Home Environment: Private residence  One/Two Story Residence: One story  Living Alone: No  Support Systems: Spouse/Significant Other,Child(raghavendra),Friend/Neighbor  OBJECTIVE:     PAIN: VITAL SIGNS: LINES/DRAINS:   Pre Treatment: Pain Screen  Pain Scale 1: Numeric (0 - 10)  Pain Intensity 1: 5  Pain Onset 1: post op  Pain Location 1: Hip  Pain Orientation 1: Right  Pain Description 1: Aching  Pain Intervention(s) 1: Ambulation/Increased Activity  Post Treatment: 5 Vital Signs  O2 Sat (%): 90 %  O2 Device: None (Room air) None  O2 Device: None (Room air)     GROSS EVALUATION:  BLE Within Functional Limits Abnormal/ Functional Abnormal/ Non-Functional (see comments) Not Tested Comments:   AROM [] [] [x] [] RLE limited    PROM [] [] [] [x]    Strength [] [x] [] [] RLE deficits    Balance [] [x] [] []    Posture [x] [] [] []    Sensation [x] [] [] []    Coordination [x] [] [] []    Tone [] [] [] []    Edema [] [] [] []    Activity Tolerance [] [x] [] [] Significantly below baseline     [] [] [] []      COGNITION/  PERCEPTION: Intact Impaired   (see comments) Comments:   Orientation [] [] Mixing up the days   Vision [x] []    Hearing [x] []    Command Following [x] []    Safety Awareness [x] []     [] []      MOBILITY: I Mod I S SBA CGA Min Mod Max Total  NT x2 Comments:   Bed Mobility    Rolling [] [] [] [] [] [] [] [] [] [x] []    Supine to Sit [] [] [] [] [] [] [x] [] [] [] []    Scooting [] [] [] [] [] [] [x] [] [] [] []    Sit to Supine [] [] [] [] [] [] [] [] [] [x] []    Transfers    Sit to Stand [] [] [] [] [] [] [x] [] [] [] [x]    Bed to Chair [] [] [] [] [] [x] [] [] [] [] [x]    Stand to Sit [] [] [] [] [] [] [x] [] [] [] [x]    I=Independent, Mod I=Modified Independent, S=Supervision, SBA=Standby Assistance, CGA=Contact Guard Assistance,   Min=Minimal Assistance, Mod=Moderate Assistance, Max=Maximal Assistance, Total=Total Assistance, NT=Not Tested  GAIT: I Mod I S SBA CGA Min Mod Max Total  NT x2 Comments:   Level of Assistance [] [] [] [] [] [x] [] [] [] [] [x]    Distance 3'    DME Rolling Walker    Gait Quality SLOW, shuffling, minimal WB tolerance on RLE, unsteady    Weightbearing Status WBAT, RLE     I=Independent, Mod I=Modified Independent, S=Supervision, SBA=Standby Assistance, CGA=Contact Guard Assistance,   Min=Minimal Assistance, Mod=Moderate Assistance, Max=Maximal Assistance, Total=Total Assistance, NT=Not Tested    [address] [facility] [address] [id number] AM-PAC 6 Clicks   Basic Mobility Inpatient Short Form       How much difficulty does the patient currently have. .. Unable A Lot A Little None   1. Turning over in bed (including adjusting bedclothes, sheets and blankets)? [] 1   [] 2   [x] 3   [] 4   2. Sitting down on and standing up from a chair with arms ( e.g., wheelchair, bedside commode, etc.)   [] 1   [x] 2   [] 3   [] 4   3. Moving from lying on back to sitting on the side of the bed? [] 1   [x] 2   [] 3   [] 4   How much help from another person does the patient currently need. .. Total A Lot A Little None   4. Moving to and from a bed to a chair (including a wheelchair)? [] 1   [x] 2   [] 3   [] 4   5. Need to walk in hospital room? [] 1   [x] 2   [] 3   [] 4   6. Climbing 3-5 steps with a railing? [x] 1   [] 2   [] 3   [] 4   © 2007, Trustees of [facility] [employer], under license to [employer]. All rights reserved     Score:  Initial: 14 Most Recent: X (Date: -- )    Interpretation of Tool:  Represents activities that are increasingly more difficult (i.e. Bed mobility, Transfers, Gait). PLAN:   FREQUENCY/DURATION: PT Plan of Care: BID for duration of hospital stay or until stated goals are met, whichever comes first.    PROBLEM LIST:   (Skilled intervention is medically necessary to address:)  1. Decreased ADL/Functional Activities  2. Decreased Activity Tolerance  3. Decreased AROM/PROM  4. Decreased Balance  5. Decreased Gait Ability  6. Decreased Strength  7. Decreased Transfer Abilities  8. Increased Pain   INTERVENTIONS PLANNED:   (Benefits and precautions of physical therapy have been discussed with the patient.)  1. Therapeutic Activity  2. Therapeutic Exercise/HEP  3. Neuromuscular Re-education  4. Gait Training  5. Manual Therapy  6. Education     TREATMENT:     EVALUATION: Low Complexity : (Untimed Charge)    TREATMENT:   ($$ Therapeutic Activity: 23-37 mins    )  Therapeutic Activity (25 Minutes):  Therapeutic activity included Supine to Sit, Scooting, Transfer Training, Ambulation on level ground, Sitting balance  and Standing balance to improve functional Mobility, Strength and Activity tolerance.     TREATMENT GRID:  N/A    AFTER TREATMENT POSITION/PRECAUTIONS:  Alarm Activated, Chair, Needs within reach and RN notified    INTERDISCIPLINARY COLLABORATION:  RN/PCT, PT/PTA and OT/YOUNG    TOTAL TREATMENT DURATION:  PT Patient Time In/Time Out  Time In: 8118  Time Out: 2460 [facility],

## 2022-02-06 ENCOUNTER — APPOINTMENT (OUTPATIENT)
Dept: NON INVASIVE DIAGNOSTICS | Age: 72
DRG: 480 | End: 2022-02-06
Attending: INTERNAL MEDICINE
Payer: MEDICARE

## 2022-02-06 ENCOUNTER — APPOINTMENT (OUTPATIENT)
Dept: GENERAL RADIOLOGY | Age: 72
DRG: 480 | End: 2022-02-06
Attending: NURSE PRACTITIONER
Payer: MEDICARE

## 2022-02-06 ENCOUNTER — APPOINTMENT (OUTPATIENT)
Dept: CT IMAGING | Age: 72
DRG: 480 | End: 2022-02-06
Attending: INTERNAL MEDICINE
Payer: MEDICARE

## 2022-02-06 PROBLEM — R91.8 PULMONARY MASS: Status: ACTIVE | Noted: 2020-02-11

## 2022-02-06 LAB
ALBUMIN SERPL-MCNC: 2.8 G/DL (ref 3.2–4.6)
ALBUMIN/GLOB SERPL: 0.8 {RATIO} (ref 1.2–3.5)
ALP SERPL-CCNC: 66 U/L (ref 50–136)
ALT SERPL-CCNC: 17 U/L (ref 12–65)
ANION GAP SERPL CALC-SCNC: 10 MMOL/L (ref 7–16)
ANION GAP SERPL CALC-SCNC: 6 MMOL/L (ref 7–16)
APTT PPP: 28.8 SEC (ref 24.1–35.1)
ARTERIAL PATENCY WRIST A: POSITIVE
AST SERPL-CCNC: 27 U/L (ref 15–37)
ATRIAL RATE: 112 BPM
ATRIAL RATE: 154 BPM
B PERT DNA SPEC QL NAA+PROBE: NOT DETECTED
BASE DEFICIT BLD-SCNC: 2.4 MMOL/L
BASOPHILS # BLD: 0 K/UL (ref 0–0.2)
BASOPHILS NFR BLD: 0 % (ref 0–2)
BDY SITE: ABNORMAL
BILIRUB SERPL-MCNC: 0.4 MG/DL (ref 0.2–1.1)
BNP SERPL-MCNC: ABNORMAL PG/ML (ref 5–125)
BORDETELLA PARAPERTUSSIS PCR, BORPAR: NOT DETECTED
BUN SERPL-MCNC: 10 MG/DL (ref 8–23)
BUN SERPL-MCNC: 9 MG/DL (ref 8–23)
C PNEUM DNA SPEC QL NAA+PROBE: NOT DETECTED
CALCIUM SERPL-MCNC: 8.8 MG/DL (ref 8.3–10.4)
CALCIUM SERPL-MCNC: 9 MG/DL (ref 8.3–10.4)
CALCULATED P AXIS, ECG09: 77 DEGREES
CALCULATED P AXIS, ECG09: 85 DEGREES
CALCULATED R AXIS, ECG10: 59 DEGREES
CALCULATED R AXIS, ECG10: 60 DEGREES
CALCULATED T AXIS, ECG11: -130 DEGREES
CALCULATED T AXIS, ECG11: -134 DEGREES
CHLORIDE SERPL-SCNC: 108 MMOL/L (ref 98–107)
CHLORIDE SERPL-SCNC: 109 MMOL/L (ref 98–107)
CO2 SERPL-SCNC: 24 MMOL/L (ref 21–32)
CO2 SERPL-SCNC: 26 MMOL/L (ref 21–32)
CREAT SERPL-MCNC: 0.7 MG/DL (ref 0.6–1)
CREAT SERPL-MCNC: 0.8 MG/DL (ref 0.6–1)
D DIMER PPP FEU-MCNC: 7.02 UG/ML(FEU)
DIAGNOSIS, 93000: NORMAL
DIAGNOSIS, 93000: NORMAL
DIFFERENTIAL METHOD BLD: ABNORMAL
ECHO AO ASC DIAM: 3 CM
ECHO AO ASCENDING AORTA INDEX: 1.66 CM/M2
ECHO AO ROOT DIAM: 3 CM
ECHO AO ROOT INDEX: 1.66 CM/M2
ECHO AR MAX VEL PISA: 3.7 M/S
ECHO AV AREA PEAK VELOCITY: 2 CM2
ECHO AV AREA VTI: 2.1 CM2
ECHO AV AREA/BSA PEAK VELOCITY: 1.1 CM2/M2
ECHO AV AREA/BSA VTI: 1.2 CM2/M2
ECHO AV MEAN GRADIENT: 3 MMHG
ECHO AV MEAN VELOCITY: 0.8 M/S
ECHO AV PEAK GRADIENT: 6 MMHG
ECHO AV PEAK VELOCITY: 1.2 M/S
ECHO AV REGURGITANT PHT: 244 MS
ECHO AV VELOCITY RATIO: 0.75
ECHO AV VTI: 21.5 CM
ECHO EST RA PRESSURE: 3 MMHG
ECHO LA AREA 2C: 15.6 CM2
ECHO LA AREA 4C: 13 CM2
ECHO LA DIAMETER INDEX: 1.38 CM/M2
ECHO LA DIAMETER: 2.5 CM
ECHO LA MAJOR AXIS: 4.3 CM
ECHO LA MINOR AXIS: 5.1 CM
ECHO LA TO AORTIC ROOT RATIO: 0.83
ECHO LA VOL 2C: 38 ML (ref 22–52)
ECHO LA VOL 4C: 31 ML (ref 22–52)
ECHO LA VOL BP: 37 ML (ref 22–52)
ECHO LA VOL/BSA BIPLANE: 20 ML/M2 (ref 16–34)
ECHO LA VOLUME INDEX A2C: 21 ML/M2 (ref 16–34)
ECHO LA VOLUME INDEX A4C: 17 ML/M2 (ref 16–34)
ECHO LV E' LATERAL VELOCITY: 7 CM/S
ECHO LV E' SEPTAL VELOCITY: 6 CM/S
ECHO LV EDV A2C: 66 ML
ECHO LV EDV A4C: 66 ML
ECHO LV EDV BP: 66 ML (ref 56–104)
ECHO LV EDV INDEX A4C: 36 ML/M2
ECHO LV EDV INDEX BP: 36 ML/M2
ECHO LV EDV NDEX A2C: 36 ML/M2
ECHO LV EJECTION FRACTION A2C: 51 %
ECHO LV EJECTION FRACTION A4C: 55 %
ECHO LV EJECTION FRACTION BIPLANE: 52 % (ref 55–100)
ECHO LV ESV A2C: 33 ML
ECHO LV ESV A4C: 30 ML
ECHO LV ESV INDEX A2C: 18 ML/M2
ECHO LV ESV INDEX A4C: 17 ML/M2
ECHO LV FRACTIONAL SHORTENING: 30 % (ref 28–44)
ECHO LV INTERNAL DIMENSION DIASTOLE INDEX: 2.38 CM/M2
ECHO LV INTERNAL DIMENSION DIASTOLIC: 4.3 CM (ref 3.9–5.3)
ECHO LV INTERNAL DIMENSION SYSTOLIC INDEX: 1.66 CM/M2
ECHO LV INTERNAL DIMENSION SYSTOLIC: 3 CM
ECHO LV IVSD: 0.8 CM (ref 0.6–0.9)
ECHO LV MASS 2D: 132.7 G (ref 67–162)
ECHO LV MASS INDEX 2D: 73.3 G/M2 (ref 43–95)
ECHO LV POSTERIOR WALL DIASTOLIC: 1.1 CM (ref 0.6–0.9)
ECHO LV RELATIVE WALL THICKNESS RATIO: 0.51
ECHO LVOT AREA: 2.8 CM2
ECHO LVOT AV VTI INDEX: 0.75
ECHO LVOT DIAM: 1.9 CM
ECHO LVOT MEAN GRADIENT: 1 MMHG
ECHO LVOT PEAK GRADIENT: 3 MMHG
ECHO LVOT PEAK VELOCITY: 0.9 M/S
ECHO LVOT STROKE VOLUME INDEX: 25.4 ML/M2
ECHO LVOT SV: 45.9 ML
ECHO LVOT VTI: 16.2 CM
ECHO MV A VELOCITY: 0.52 M/S
ECHO MV E DECELERATION TIME (DT): 114 MS
ECHO MV E VELOCITY: 1.07 M/S
ECHO MV E/A RATIO: 2.06
ECHO MV E/E' LATERAL: 15.29
ECHO MV E/E' RATIO (AVERAGED): 16.56
ECHO MV E/E' SEPTAL: 17.83
ECHO MV EROA PISA: 7 CM2
ECHO MV REGURGITANT ALIASING (NYQUIST) VELOCITY: 37 CM/S
ECHO MV REGURGITANT PEAK GRADIENT: 112 MMHG
ECHO MV REGURGITANT PEAK VELOCITY: 5.3 M/S
ECHO MV REGURGITANT RADIUS PISA: 0.4 CM
ECHO MV REGURGITANT VOLUME PISA: 1129.36 ML
ECHO MV REGURGITANT VTIA: 161 CM
ECHO RIGHT VENTRICULAR SYSTOLIC PRESSURE (RVSP): 26 MMHG
ECHO RV INTERNAL DIMENSION: 2 CM
ECHO RV TAPSE: 1.8 CM (ref 1.5–2)
ECHO TV REGURGITANT MAX VELOCITY: 2.38 M/S
ECHO TV REGURGITANT PEAK GRADIENT: 23 MMHG
EOSINOPHIL # BLD: 0 K/UL (ref 0–0.8)
EOSINOPHIL # BLD: 0.2 K/UL (ref 0–0.8)
EOSINOPHIL # BLD: 0.2 K/UL (ref 0–0.8)
EOSINOPHIL NFR BLD: 0 % (ref 0.5–7.8)
EOSINOPHIL NFR BLD: 2 % (ref 0.5–7.8)
EOSINOPHIL NFR BLD: 2 % (ref 0.5–7.8)
ERYTHROCYTE [DISTWIDTH] IN BLOOD BY AUTOMATED COUNT: 14.2 % (ref 11.9–14.6)
ERYTHROCYTE [DISTWIDTH] IN BLOOD BY AUTOMATED COUNT: 14.3 % (ref 11.9–14.6)
FLUAV SUBTYP SPEC NAA+PROBE: NOT DETECTED
FLUBV RNA SPEC QL NAA+PROBE: NOT DETECTED
GAS FLOW.O2 O2 DELIVERY SYS: ABNORMAL L/MIN
GLOBULIN SER CALC-MCNC: 3.7 G/DL (ref 2.3–3.5)
GLUCOSE SERPL-MCNC: 157 MG/DL (ref 65–100)
GLUCOSE SERPL-MCNC: 195 MG/DL (ref 65–100)
HADV DNA SPEC QL NAA+PROBE: NOT DETECTED
HCO3 BLD-SCNC: 23.8 MMOL/L (ref 22–26)
HCOV 229E RNA SPEC QL NAA+PROBE: NOT DETECTED
HCOV HKU1 RNA SPEC QL NAA+PROBE: NOT DETECTED
HCOV NL63 RNA SPEC QL NAA+PROBE: NOT DETECTED
HCOV OC43 RNA SPEC QL NAA+PROBE: NOT DETECTED
HCT VFR BLD AUTO: 24.5 % (ref 35.8–46.3)
HCT VFR BLD AUTO: 24.6 % (ref 35.8–46.3)
HCT VFR BLD AUTO: 26.7 % (ref 35.8–46.3)
HCT VFR BLD AUTO: 28.3 % (ref 35.8–46.3)
HGB BLD-MCNC: 7.8 G/DL (ref 11.7–15.4)
HGB BLD-MCNC: 7.8 G/DL (ref 11.7–15.4)
HGB BLD-MCNC: 8.4 G/DL (ref 11.7–15.4)
HGB BLD-MCNC: 9 G/DL (ref 11.7–15.4)
HGB BLD-MCNC: 9 G/DL (ref 11.7–15.4)
HMPV RNA SPEC QL NAA+PROBE: NOT DETECTED
HPIV1 RNA SPEC QL NAA+PROBE: NOT DETECTED
HPIV2 RNA SPEC QL NAA+PROBE: NOT DETECTED
HPIV3 RNA SPEC QL NAA+PROBE: NOT DETECTED
HPIV4 RNA SPEC QL NAA+PROBE: NOT DETECTED
IMM GRANULOCYTES # BLD AUTO: 0 K/UL (ref 0–0.5)
IMM GRANULOCYTES # BLD AUTO: 0 K/UL (ref 0–0.5)
IMM GRANULOCYTES # BLD AUTO: 0.1 K/UL (ref 0–0.5)
IMM GRANULOCYTES NFR BLD AUTO: 0 % (ref 0–5)
IMM GRANULOCYTES NFR BLD AUTO: 0 % (ref 0–5)
IMM GRANULOCYTES NFR BLD AUTO: 1 % (ref 0–5)
LACTATE SERPL-SCNC: 2.6 MMOL/L (ref 0.4–2)
LYMPHOCYTES # BLD: 0.2 K/UL (ref 0.5–4.6)
LYMPHOCYTES # BLD: 0.3 K/UL (ref 0.5–4.6)
LYMPHOCYTES # BLD: 0.5 K/UL (ref 0.5–4.6)
LYMPHOCYTES NFR BLD: 2 % (ref 13–44)
LYMPHOCYTES NFR BLD: 3 % (ref 13–44)
LYMPHOCYTES NFR BLD: 4 % (ref 13–44)
M PNEUMO DNA SPEC QL NAA+PROBE: NOT DETECTED
MAGNESIUM SERPL-MCNC: 1.7 MG/DL (ref 1.6–2.3)
MCH RBC QN AUTO: 29.3 PG (ref 26.1–32.9)
MCH RBC QN AUTO: 29.4 PG (ref 26.1–32.9)
MCHC RBC AUTO-ENTMCNC: 31.5 G/DL (ref 31.4–35)
MCHC RBC AUTO-ENTMCNC: 31.7 G/DL (ref 31.4–35)
MCHC RBC AUTO-ENTMCNC: 31.8 G/DL (ref 31.4–35)
MCHC RBC AUTO-ENTMCNC: 31.8 G/DL (ref 31.4–35)
MCV RBC AUTO: 92.1 FL (ref 79.6–97.8)
MCV RBC AUTO: 92.5 FL (ref 79.6–97.8)
MCV RBC AUTO: 92.8 FL (ref 79.6–97.8)
MCV RBC AUTO: 93.4 FL (ref 79.6–97.8)
MM INDURATION POC: 0 MM (ref 0–5)
MONOCYTES # BLD: 0.3 K/UL (ref 0.1–1.3)
MONOCYTES # BLD: 0.3 K/UL (ref 0.1–1.3)
MONOCYTES # BLD: 0.8 K/UL (ref 0.1–1.3)
MONOCYTES NFR BLD: 3 % (ref 4–12)
MONOCYTES NFR BLD: 3 % (ref 4–12)
MONOCYTES NFR BLD: 8 % (ref 4–12)
NEUTS SEG # BLD: 8.5 K/UL (ref 1.7–8.2)
NEUTS SEG # BLD: 8.8 K/UL (ref 1.7–8.2)
NEUTS SEG # BLD: 9.3 K/UL (ref 1.7–8.2)
NEUTS SEG NFR BLD: 87 % (ref 43–78)
NEUTS SEG NFR BLD: 92 % (ref 43–78)
NEUTS SEG NFR BLD: 93 % (ref 43–78)
NRBC # BLD: 0 K/UL (ref 0–0.2)
O2/TOTAL GAS SETTING VFR VENT: 100 %
P-R INTERVAL, ECG05: 150 MS
P-R INTERVAL, ECG05: 162 MS
PCO2 BLD: 46.3 MMHG (ref 35–45)
PH BLD: 7.32 [PH] (ref 7.35–7.45)
PLATELET # BLD AUTO: 180 K/UL (ref 150–450)
PLATELET # BLD AUTO: 187 K/UL (ref 150–450)
PLATELET # BLD AUTO: 189 K/UL (ref 150–450)
PLATELET # BLD AUTO: 214 K/UL (ref 150–450)
PMV BLD AUTO: 10 FL (ref 9.4–12.3)
PMV BLD AUTO: 10.4 FL (ref 9.4–12.3)
PMV BLD AUTO: 9.8 FL (ref 9.4–12.3)
PMV BLD AUTO: 9.9 FL (ref 9.4–12.3)
PO2 BLD: 95 MMHG (ref 75–100)
POTASSIUM SERPL-SCNC: 3.6 MMOL/L (ref 3.5–5.1)
POTASSIUM SERPL-SCNC: 3.7 MMOL/L (ref 3.5–5.1)
PPD POC: NEGATIVE NEGATIVE
PROCALCITONIN SERPL-MCNC: 1.06 NG/ML (ref 0–0.49)
PROT SERPL-MCNC: 6.5 G/DL (ref 6.3–8.2)
Q-T INTERVAL, ECG07: 288 MS
Q-T INTERVAL, ECG07: 354 MS
QRS DURATION, ECG06: 82 MS
QRS DURATION, ECG06: 94 MS
QTC CALCULATION (BEZET), ECG08: 461 MS
QTC CALCULATION (BEZET), ECG08: 483 MS
RBC # BLD AUTO: 2.65 M/UL (ref 4.05–5.2)
RBC # BLD AUTO: 2.66 M/UL (ref 4.05–5.2)
RBC # BLD AUTO: 2.86 M/UL (ref 4.05–5.2)
RBC # BLD AUTO: 3.06 M/UL (ref 4.05–5.2)
RSV RNA SPEC QL NAA+PROBE: NOT DETECTED
RV+EV RNA SPEC QL NAA+PROBE: NOT DETECTED
SAO2 % BLD: 96.7 % (ref 95–98)
SARS-COV-2 PCR, COVPCR: NOT DETECTED
SERVICE CMNT-IMP: ABNORMAL
SERVICE CMNT-IMP: ABNORMAL
SODIUM SERPL-SCNC: 141 MMOL/L (ref 136–145)
SODIUM SERPL-SCNC: 142 MMOL/L (ref 136–145)
SPECIMEN TYPE: ABNORMAL
TOTAL RESP. RATE, ITRR: 20
TROPONIN-HIGH SENSITIVITY: 676.4 PG/ML (ref 0–14)
TROPONIN-HIGH SENSITIVITY: 7394 PG/ML (ref 0–14)
TROPONIN-HIGH SENSITIVITY: ABNORMAL PG/ML (ref 0–14)
VENTRICULAR RATE, ECG03: 112 BPM
VENTRICULAR RATE, ECG03: 154 BPM
WBC # BLD AUTO: 10.7 K/UL (ref 4.3–11.1)
WBC # BLD AUTO: 12.1 K/UL (ref 4.3–11.1)
WBC # BLD AUTO: 9.2 K/UL (ref 4.3–11.1)
WBC # BLD AUTO: 9.5 K/UL (ref 4.3–11.1)

## 2022-02-06 PROCEDURE — 99223 1ST HOSP IP/OBS HIGH 75: CPT | Performed by: INTERNAL MEDICINE

## 2022-02-06 PROCEDURE — 84484 ASSAY OF TROPONIN QUANT: CPT

## 2022-02-06 PROCEDURE — 77030040830 HC CATH URETH FOL MDII -A

## 2022-02-06 PROCEDURE — 93306 TTE W/DOPPLER COMPLETE: CPT

## 2022-02-06 PROCEDURE — 99222 1ST HOSP IP/OBS MODERATE 55: CPT | Performed by: PHYSICIAN ASSISTANT

## 2022-02-06 PROCEDURE — 83735 ASSAY OF MAGNESIUM: CPT

## 2022-02-06 PROCEDURE — 85730 THROMBOPLASTIN TIME PARTIAL: CPT

## 2022-02-06 PROCEDURE — 87040 BLOOD CULTURE FOR BACTERIA: CPT

## 2022-02-06 PROCEDURE — 74011250636 HC RX REV CODE- 250/636: Performed by: INTERNAL MEDICINE

## 2022-02-06 PROCEDURE — 74011000250 HC RX REV CODE- 250: Performed by: NURSE PRACTITIONER

## 2022-02-06 PROCEDURE — 36600 WITHDRAWAL OF ARTERIAL BLOOD: CPT

## 2022-02-06 PROCEDURE — 85379 FIBRIN DEGRADATION QUANT: CPT

## 2022-02-06 PROCEDURE — 74011250637 HC RX REV CODE- 250/637: Performed by: INTERNAL MEDICINE

## 2022-02-06 PROCEDURE — 84145 PROCALCITONIN (PCT): CPT

## 2022-02-06 PROCEDURE — 93005 ELECTROCARDIOGRAM TRACING: CPT | Performed by: INTERNAL MEDICINE

## 2022-02-06 PROCEDURE — 71260 CT THORAX DX C+: CPT

## 2022-02-06 PROCEDURE — 51798 US URINE CAPACITY MEASURE: CPT

## 2022-02-06 PROCEDURE — 74011250637 HC RX REV CODE- 250/637: Performed by: HOSPITALIST

## 2022-02-06 PROCEDURE — 74011000258 HC RX REV CODE- 258: Performed by: INTERNAL MEDICINE

## 2022-02-06 PROCEDURE — 94640 AIRWAY INHALATION TREATMENT: CPT

## 2022-02-06 PROCEDURE — 85025 COMPLETE CBC W/AUTO DIFF WBC: CPT

## 2022-02-06 PROCEDURE — 94761 N-INVAS EAR/PLS OXIMETRY MLT: CPT

## 2022-02-06 PROCEDURE — 85027 COMPLETE CBC AUTOMATED: CPT

## 2022-02-06 PROCEDURE — 0202U NFCT DS 22 TRGT SARS-COV-2: CPT

## 2022-02-06 PROCEDURE — 83605 ASSAY OF LACTIC ACID: CPT

## 2022-02-06 PROCEDURE — 83880 ASSAY OF NATRIURETIC PEPTIDE: CPT

## 2022-02-06 PROCEDURE — 65660000000 HC RM CCU STEPDOWN

## 2022-02-06 PROCEDURE — 94760 N-INVAS EAR/PLS OXIMETRY 1: CPT

## 2022-02-06 PROCEDURE — 80053 COMPREHEN METABOLIC PANEL: CPT

## 2022-02-06 PROCEDURE — 74011000250 HC RX REV CODE- 250: Performed by: ORTHOPAEDIC SURGERY

## 2022-02-06 PROCEDURE — 74011250637 HC RX REV CODE- 250/637: Performed by: ORTHOPAEDIC SURGERY

## 2022-02-06 PROCEDURE — 74011000250 HC RX REV CODE- 250: Performed by: INTERNAL MEDICINE

## 2022-02-06 PROCEDURE — 94660 CPAP INITIATION&MGMT: CPT

## 2022-02-06 PROCEDURE — 2709999900 HC NON-CHARGEABLE SUPPLY

## 2022-02-06 PROCEDURE — 74011250636 HC RX REV CODE- 250/636: Performed by: NURSE PRACTITIONER

## 2022-02-06 PROCEDURE — 74011250637 HC RX REV CODE- 250/637: Performed by: NURSE PRACTITIONER

## 2022-02-06 PROCEDURE — 82803 BLOOD GASES ANY COMBINATION: CPT

## 2022-02-06 PROCEDURE — 77030038269 HC DRN EXT URIN PURWCK BARD -A

## 2022-02-06 PROCEDURE — 71045 X-RAY EXAM CHEST 1 VIEW: CPT

## 2022-02-06 PROCEDURE — 76937 US GUIDE VASCULAR ACCESS: CPT

## 2022-02-06 PROCEDURE — 77010033678 HC OXYGEN DAILY

## 2022-02-06 PROCEDURE — 36415 COLL VENOUS BLD VENIPUNCTURE: CPT

## 2022-02-06 PROCEDURE — 85018 HEMOGLOBIN: CPT

## 2022-02-06 PROCEDURE — 77010033711 HC HIGH FLOW OXYGEN

## 2022-02-06 PROCEDURE — 74011000636 HC RX REV CODE- 636: Performed by: INTERNAL MEDICINE

## 2022-02-06 PROCEDURE — 77030041974 HC CATH SYS PERIPH TELE -B

## 2022-02-06 PROCEDURE — C1751 CATH, INF, PER/CENT/MIDLINE: HCPCS

## 2022-02-06 PROCEDURE — 74011636637 HC RX REV CODE- 636/637: Performed by: HOSPITALIST

## 2022-02-06 RX ORDER — HEPARIN SODIUM 1000 [USP'U]/ML
60 INJECTION, SOLUTION INTRAVENOUS; SUBCUTANEOUS ONCE
Status: COMPLETED | OUTPATIENT
Start: 2022-02-06 | End: 2022-02-06

## 2022-02-06 RX ORDER — ASPIRIN 325 MG
325 TABLET ORAL ONCE
Status: COMPLETED | OUTPATIENT
Start: 2022-02-06 | End: 2022-02-06

## 2022-02-06 RX ORDER — FUROSEMIDE 10 MG/ML
40 INJECTION INTRAMUSCULAR; INTRAVENOUS ONCE
Status: COMPLETED | OUTPATIENT
Start: 2022-02-07 | End: 2022-02-07

## 2022-02-06 RX ORDER — DILTIAZEM HYDROCHLORIDE 5 MG/ML
10 INJECTION INTRAVENOUS ONCE
Status: COMPLETED | OUTPATIENT
Start: 2022-02-06 | End: 2022-02-06

## 2022-02-06 RX ORDER — SODIUM CHLORIDE 0.9 % (FLUSH) 0.9 %
10 SYRINGE (ML) INJECTION
Status: COMPLETED | OUTPATIENT
Start: 2022-02-06 | End: 2022-02-06

## 2022-02-06 RX ORDER — LORAZEPAM 2 MG/ML
1 INJECTION INTRAMUSCULAR ONCE
Status: COMPLETED | OUTPATIENT
Start: 2022-02-06 | End: 2022-02-06

## 2022-02-06 RX ORDER — ALBUTEROL SULFATE 0.83 MG/ML
2.5 SOLUTION RESPIRATORY (INHALATION)
Status: DISCONTINUED | OUTPATIENT
Start: 2022-02-06 | End: 2022-02-07

## 2022-02-06 RX ORDER — IPRATROPIUM BROMIDE AND ALBUTEROL SULFATE 2.5; .5 MG/3ML; MG/3ML
3 SOLUTION RESPIRATORY (INHALATION)
Status: DISCONTINUED | OUTPATIENT
Start: 2022-02-06 | End: 2022-02-10 | Stop reason: HOSPADM

## 2022-02-06 RX ORDER — GUAIFENESIN 600 MG/1
1200 TABLET, EXTENDED RELEASE ORAL EVERY 12 HOURS
Status: DISCONTINUED | OUTPATIENT
Start: 2022-02-06 | End: 2022-02-10 | Stop reason: HOSPADM

## 2022-02-06 RX ORDER — CLONAZEPAM 1 MG/1
0.5 TABLET ORAL
Status: DISCONTINUED | OUTPATIENT
Start: 2022-02-06 | End: 2022-02-09

## 2022-02-06 RX ORDER — HEPARIN SODIUM 5000 [USP'U]/100ML
12-25 INJECTION, SOLUTION INTRAVENOUS
Status: DISCONTINUED | OUTPATIENT
Start: 2022-02-06 | End: 2022-02-07

## 2022-02-06 RX ORDER — LEVOFLOXACIN 5 MG/ML
750 INJECTION, SOLUTION INTRAVENOUS EVERY 24 HOURS
Status: DISCONTINUED | OUTPATIENT
Start: 2022-02-06 | End: 2022-02-08

## 2022-02-06 RX ORDER — MORPHINE SULFATE 4 MG/ML
2 INJECTION INTRAVENOUS ONCE
Status: COMPLETED | OUTPATIENT
Start: 2022-02-06 | End: 2022-02-06

## 2022-02-06 RX ORDER — FUROSEMIDE 10 MG/ML
40 INJECTION INTRAMUSCULAR; INTRAVENOUS ONCE
Status: COMPLETED | OUTPATIENT
Start: 2022-02-06 | End: 2022-02-06

## 2022-02-06 RX ORDER — METOPROLOL SUCCINATE 50 MG/1
50 TABLET, EXTENDED RELEASE ORAL ONCE
Status: COMPLETED | OUTPATIENT
Start: 2022-02-06 | End: 2022-02-06

## 2022-02-06 RX ORDER — IPRATROPIUM BROMIDE AND ALBUTEROL SULFATE 2.5; .5 MG/3ML; MG/3ML
3 SOLUTION RESPIRATORY (INHALATION)
Status: DISCONTINUED | OUTPATIENT
Start: 2022-02-06 | End: 2022-02-07

## 2022-02-06 RX ORDER — BUDESONIDE 0.5 MG/2ML
1000 INHALANT ORAL
Status: DISCONTINUED | OUTPATIENT
Start: 2022-02-06 | End: 2022-02-10

## 2022-02-06 RX ORDER — ASPIRIN 325 MG
325 TABLET ORAL DAILY
Status: DISCONTINUED | OUTPATIENT
Start: 2022-02-06 | End: 2022-02-06

## 2022-02-06 RX ORDER — LORAZEPAM 1 MG/1
1 TABLET ORAL ONCE
Status: COMPLETED | OUTPATIENT
Start: 2022-02-06 | End: 2022-02-06

## 2022-02-06 RX ORDER — LEVALBUTEROL INHALATION SOLUTION 1.25 MG/3ML
1.25 SOLUTION RESPIRATORY (INHALATION)
Status: COMPLETED | OUTPATIENT
Start: 2022-02-06 | End: 2022-02-06

## 2022-02-06 RX ADMIN — PANTOPRAZOLE SODIUM 40 MG: 40 TABLET, DELAYED RELEASE ORAL at 09:26

## 2022-02-06 RX ADMIN — SODIUM CHLORIDE, PRESERVATIVE FREE 10 ML: 5 INJECTION INTRAVENOUS at 22:41

## 2022-02-06 RX ADMIN — HEPARIN SODIUM 12 UNITS/KG/HR: 5000 INJECTION, SOLUTION INTRAVENOUS at 22:55

## 2022-02-06 RX ADMIN — NITROGLYCERIN 0.4 MG: 0.4 TABLET SUBLINGUAL at 04:56

## 2022-02-06 RX ADMIN — SODIUM CHLORIDE, PRESERVATIVE FREE 10 ML: 5 INJECTION INTRAVENOUS at 13:50

## 2022-02-06 RX ADMIN — SODIUM CHLORIDE 100 ML: 900 INJECTION, SOLUTION INTRAVENOUS at 12:19

## 2022-02-06 RX ADMIN — LEVOFLOXACIN 750 MG: 5 INJECTION, SOLUTION INTRAVENOUS at 16:43

## 2022-02-06 RX ADMIN — NITROGLYCERIN 0.4 MG: 0.4 TABLET SUBLINGUAL at 12:49

## 2022-02-06 RX ADMIN — PREDNISONE 10 MG: 10 TABLET ORAL at 09:26

## 2022-02-06 RX ADMIN — FUROSEMIDE 40 MG: 10 INJECTION, SOLUTION INTRAMUSCULAR; INTRAVENOUS at 06:36

## 2022-02-06 RX ADMIN — LEVALBUTEROL HYDROCHLORIDE 1.25 MG: 1.25 SOLUTION RESPIRATORY (INHALATION) at 06:14

## 2022-02-06 RX ADMIN — METHYLPREDNISOLONE SODIUM SUCCINATE 40 MG: 40 INJECTION, POWDER, FOR SOLUTION INTRAMUSCULAR; INTRAVENOUS at 13:50

## 2022-02-06 RX ADMIN — LORAZEPAM 1 MG: 2 INJECTION INTRAMUSCULAR; INTRAVENOUS at 06:22

## 2022-02-06 RX ADMIN — GUAIFENESIN 1200 MG: 600 TABLET ORAL at 22:33

## 2022-02-06 RX ADMIN — Medication 1 AMPULE: at 22:33

## 2022-02-06 RX ADMIN — HEPARIN SODIUM 4080 UNITS: 1000 INJECTION INTRAVENOUS; SUBCUTANEOUS at 06:43

## 2022-02-06 RX ADMIN — DILTIAZEM HYDROCHLORIDE 10 MG: 5 INJECTION INTRAVENOUS at 06:52

## 2022-02-06 RX ADMIN — LORAZEPAM 1 MG: 1 TABLET ORAL at 01:15

## 2022-02-06 RX ADMIN — ACETAMINOPHEN 1000 MG: 500 TABLET, FILM COATED ORAL at 13:50

## 2022-02-06 RX ADMIN — NITROGLYCERIN 0.4 MG: 0.4 TABLET SUBLINGUAL at 02:18

## 2022-02-06 RX ADMIN — IOPAMIDOL 100 ML: 755 INJECTION, SOLUTION INTRAVENOUS at 12:19

## 2022-02-06 RX ADMIN — ASPIRIN 325 MG ORAL TABLET 325 MG: 325 PILL ORAL at 05:53

## 2022-02-06 RX ADMIN — METOPROLOL SUCCINATE 50 MG: 50 TABLET, FILM COATED, EXTENDED RELEASE ORAL at 09:24

## 2022-02-06 RX ADMIN — Medication 1 AMPULE: at 09:26

## 2022-02-06 RX ADMIN — NITROGLYCERIN 0.4 MG: 0.4 TABLET SUBLINGUAL at 13:39

## 2022-02-06 RX ADMIN — GUAIFENESIN 1200 MG: 600 TABLET ORAL at 13:50

## 2022-02-06 RX ADMIN — NITROGLYCERIN 0.4 MG: 0.4 TABLET SUBLINGUAL at 00:42

## 2022-02-06 RX ADMIN — SODIUM CHLORIDE, PRESERVATIVE FREE 5 ML: 5 INJECTION INTRAVENOUS at 05:23

## 2022-02-06 RX ADMIN — MORPHINE SULFATE 2 MG: 4 INJECTION INTRAVENOUS at 05:23

## 2022-02-06 RX ADMIN — ALBUTEROL SULFATE 2.5 MG: 2.5 SOLUTION RESPIRATORY (INHALATION) at 14:41

## 2022-02-06 RX ADMIN — CLONAZEPAM 0.5 MG: 0.5 TABLET ORAL at 13:50

## 2022-02-06 RX ADMIN — IPRATROPIUM BROMIDE AND ALBUTEROL SULFATE 3 ML: .5; 3 SOLUTION RESPIRATORY (INHALATION) at 01:10

## 2022-02-06 RX ADMIN — BUDESONIDE 1000 MCG: 0.5 INHALANT RESPIRATORY (INHALATION) at 19:53

## 2022-02-06 RX ADMIN — ATORVASTATIN CALCIUM 80 MG: 80 TABLET, FILM COATED ORAL at 22:34

## 2022-02-06 RX ADMIN — OXYCODONE HYDROCHLORIDE 10 MG: 5 TABLET ORAL at 04:49

## 2022-02-06 RX ADMIN — IPRATROPIUM BROMIDE AND ALBUTEROL SULFATE 3 ML: .5; 3 SOLUTION RESPIRATORY (INHALATION) at 19:53

## 2022-02-06 RX ADMIN — Medication 10 ML: at 12:19

## 2022-02-06 RX ADMIN — ACETAMINOPHEN 1000 MG: 500 TABLET, FILM COATED ORAL at 00:41

## 2022-02-06 RX ADMIN — METHYLPREDNISOLONE SODIUM SUCCINATE 40 MG: 40 INJECTION, POWDER, FOR SOLUTION INTRAMUSCULAR; INTRAVENOUS at 22:34

## 2022-02-06 NOTE — CONSULTS
Sulaiman Valdez MD  Medical Director  [address] [place] [address], [address] [facility]  Tel: [phone]       Physical Medicine & Rehabilitation Consult    Subjective:     Date of Consultation:  February 6, 2022  Referring Provider: Peace Galaviz MD / Hospitalist service    Shaquille Teixeira is a 66 y.o. white female who is being evaluated at the request of Hospitalist service for consideration for inpatient rehabilitation following femur fracture. HPI: The patient is a right-hand dominant, previously functionally independent 66yo WF with PMH including CAD s/p PCI, HTN, O2- and steroid-dependent COPD, GERD, DDD with T12 compression fracture (11/2021), past tobacco use. She presented to the ED 2/3/2022 after mechanical fall with subsequent right hip pain and inability to bear weight. XR noted right femur intertrochanteric fracture. Cardiology cleared patient for surgery. On 2/4, she underwent gamma nail insertion by Adalberto Lea MD (WBAT). She was evaluated by PT / OT on POD #1 and was found to have mobility and self-care deficits. Early POD #2, she experienced respiratory distress, tachycardia and substernal chest discomfort initially improved by NTG and BiPAP. CXR unrevealing, ECG not consistent with acute STEMI. Cardiology opined likely supply-demand mismatch in setting of tachycardia and hypoxemic respiratory failure and recommended TTE and PE work-up (ongoing).      PLOF: independent with ADLS; mobilizes without AD in home, community ambulation limited first by COPD, then by pain from T12 compression fracture  Per PT: mod A for bed mobility and sit-to-stand, min A for transfers and gait / mobility, able to take 3-4 heel-to-toe pivot steps with RW   Per OT: min A for feeding / grooming, mod A for bathing and UB dressing, max A for LB dressing and toileting  Home environment: lives with significant other, Ayde Estrada, in 1-level home with 1 KADY      Principal Problem:    Other fracture of right femur, initial encounter for closed fracture ([facility] (2022)    Active Problems:    COPD, very severe ([address] (2013)      Hyperlipidemia (10/10/2016)      Essential hypertension, benign (10/10/2016)      CAD S/P percutaneous coronary angioplasty (2019)      Closed right hip fracture ([facility] (2022)      Past Medical History:   Diagnosis Date    Arrhythmia     Arthritis     CAD (coronary artery disease) , 2013    PCI,  Bianca Townsend     Carotid stenosis, right     endarterectomy 19    Chronic anxiety 2017    COPD     recent referral to [address] for lung transplant    [facility]     GERD (gastroesophageal reflux disease)     controlled with medication    History of echocardiogram 2019    History of echocardiogram     echo 19 LVEF 55-60%    Hypertension     Nausea & vomiting     Oxygen dependent     1 liter at night    Pleurisy     Thyroid disease     pt denies      Past Surgical History:   Procedure Laterality Date    HX GYN      rebuilt cervix    HX HEART CATHETERIZATION  2019    last stent- per patient- 10 stents total    HX OTHER SURGICAL      HX TONSIL AND ADENOIDECTOMY      VASCULAR SURGERY PROCEDURE UNLIST Right 2019    carotid endarterectomy      Family History   Problem Relation Age of Onset    No Known Problems Mother         adopted      Social History     Tobacco Use    Smoking status: Former Smoker     Packs/day: 0.75     Years: 53.00     Pack years: 39.75     Types: Cigarettes     Quit date:      Years since quittin.1    Smokeless tobacco: Never Used   Substance Use Topics    Alcohol use: Yes     Comment: rarely     Prior to Admission medications    Medication Sig Start Date End Date Taking? Authorizing Provider   nitroglycerin (NITROSTAT) 0.4 mg SL tablet 1 Tablet by SubLINGual route three (3) times daily as needed for Chest Pain. Up to 3 doses.  22  Yes REKHA Ocampo   azithromycin (ZITHROMAX) 500 mg tab TAKE 1 TABLET BY MOUTH EVERY MONDAY, WEDNESDAY, AND FRIDAY 1/9/22  Yes Art Brady NP   roflumilast (Daliresp) 500 mcg tab tablet Take 1 Tablet by mouth daily. 11/30/21  Yes Art Brady NP   ezetimibe (ZETIA) 10 mg tablet Take 1 Tablet by mouth daily. 10/7/21  Yes West Thayer MD   budesonide-formoteroL (Symbicort) 160-4.5 mcg/actuation HFAA Take 2 Puffs by inhalation two (2) times a day. 10/7/21  Yes Art Brady NP   metoprolol succinate (TOPROL-XL) 100 mg tablet Take 1 Tablet by mouth daily. 9/27/21  Yes West Thayer MD   clopidogreL (PLAVIX) 75 mg tab TAKE 1 TABLET BY MOUTH DAILY 9/27/21  Yes West Thayer MD   torsemide [facility]) 10 mg tablet Take 1 Tablet by mouth daily. 6/4/21  Yes Pedro Davis MD   tiotropium (Spiriva with HandiHaler) 18 mcg inhalation capsule Take 1 Cap by inhalation daily. 2/24/21  Yes Art Brady NP   multivitamin/folic acid/biotin (HAIR-SKIN-NAILS, MV-FA-BIOTIN, PO) Take  by mouth. Yes Provider, Historical   pantoprazole (PROTONIX) 40 mg tablet TAKE 1 TABLET BY MOUTH TWICE DAILY 9/26/19  Yes Kayleigh Lim MD   nitroglycerin (NITROSTAT) 0.4 mg SL tablet 1 Tab by SubLINGual route every five (5) minutes as needed for Chest Pain. 5/6/19  Yes Kayleigh Lim MD   cetirizine (ZYRTEC) 10 mg tablet Take  by mouth daily as needed. Yes Provider, Historical   multivitamin (ONE A DAY) tablet Take 1 Tablet by mouth daily. Yes Provider, Historical   guaiFENesin ER (MUCINEX) 600 mg ER tablet Take 600 mg by mouth two (2) times daily as needed. Yes Provider, Historical   aspirin delayed-release 81 mg tablet Take  by mouth daily. Yes Provider, Historical   predniSONE (DELTASONE) 20 mg tablet 2 tabs po qd x 3 days, 1 and 1/2 tabs po qd x 3 days, 1 tab po qd x 3 days, 1/2 tab po qd x 3 days, or as directed.   Patient taking differently: 2 tabs po qd x 3 days, 1 and 1/2 tabs po qd x 3 days, 1 tab po qd x 3 days, 1/2 tab po qd x 3 days, or as directed. Pt is taking ten mg a day 12/17/21   Etelvina Flannery MD   atorvastatin (LIPITOR) 80 mg tablet TAKE 1 TABLET BY MOUTH DAILY 9/27/21   Mary Avendaño MD   ramipriL (ALTACE) 5 mg capsule Take 1 Capsule by mouth daily. 9/27/21   Mary Avendaño MD   albuterol (PROVENTIL HFA, VENTOLIN HFA, PROAIR HFA) 90 mcg/actuation inhaler Take 2 Puffs by inhalation every four (4) hours as needed for Wheezing or Shortness of Breath. 8/1/21   Arlyn Loo NP   OTHER Handicap placard 1/25/21   Arlyn Loo NP   albuterol (PROVENTIL VENTOLIN) 2.5 mg /3 mL (0.083 %) nebu 3 mL by Nebulization route every six (6) hours as needed for Shortness of Breath. ICD-10-J44.9, please bill to Med B 9/8/20   Arlyn Loo NP   OXYGEN-AIR DELIV[employer] by Does Not Apply route.  1lpm qhs     Provider, Historical     Allergies   Allergen Reactions    Promethazine Nausea and Vomiting and Other (comments)     Severe vomiting         Review of Systems:   Constitutional: positive for fatigue, negative for fevers, chills and malaise  Eyes: positive for cataract (left), negative for visual disturbance  Ears, nose, mouth, throat, and face: negative for hearing loss, epistaxis, sore throat and hoarseness  Respiratory: positive for dyspnea on exertion, negative for cough or hemoptysis  Cardiovascular: positive for chest pain, dyspnea on exertion, negative for palpitations, near-syncope  Gastrointestinal: positive for nausea, negative for dysphagia, odynophagia, vomiting, change in bowel habits and abdominal pain  Genitourinary: negative for frequency, dysuria, hesitancy and hematuria  Musculoskeletal: positive for arthralgias, back pain and muscle weakness, negative for neck pain  Neurological: positive for coordination problems, gait problems and weakness, negative for headaches, dizziness, seizures, memory problems, speech problems and paresthesia    Objective:     Vitals:  Blood pressure (!) 110/56, pulse (!) 102, temperature 98.3 °F (36.8 °C), resp. rate 19, height 5' 8\" (1.727 m), weight 150 lb (68 kg), SpO2 95 %.   Temp (24hrs), Av.8 °F (36.6 °C), Min:97.4 °F (36.3 °C), Max:98.3 °F (36.8 °C)    Intake and Output:   1901 -  0700  In: -   Out: 1500 [Urine:1500]    Physical Exam:  General: Drowsy but cooperative WF in no acute distress   Skin:  Warm, moist with texture appropriate for age, numerous ecchymoses on B UE  Eyes:  PERRL, sclera are clear, extraocular muscles intact  HENT:  Normocephalic; nares patent, oral mucosa moist, neck supple; trachea midline  Respiratory: Limited breath sounds bilaterally, breathing is non-labored   CV:  Rapid rate, regular underlying rhythm, no appreciable murmur  Abdomen: Soft, non-tender, not distended; bowel sounds normoactive   Extremities: UE strength at biceps, triceps and  5/5 and symmetric; R LE MMT limited by pain and much apprehension; patient lifts L LE off bed against gravity, strength at hip flexion 3/5, knee flexion 3+/5 (R), 4+/5 (L), ankle DF/PF 4+/5 (R), 5/5 (L); no edema, cyanosis, clubbing  Neurological: Oriented to person, year, city, president; drowsy from eventful night and minimal sleep but fair attention and focus; no dysarthria or facial weakness; visual fields are full to finger confrontation; symmetric shoulder shrug; able to read, perform simple math, identify common object and state its use      Labs/Studies:  Recent Results (from the past 72 hour(s))   CBC W/O DIFF    Collection Time: 22 11:00 PM   Result Value Ref Range    WBC 7.2 4.3 - 11.1 K/uL    RBC 3.88 (L) 4.05 - 5.2 M/uL    HGB 11.5 (L) 11.7 - 15.4 g/dL    HCT 35.8 35.8 - 46.3 %    MCV 92.3 79.6 - 97.8 FL    MCH 29.6 26.1 - 32.9 PG    MCHC 32.1 31.4 - 35.0 g/dL    RDW 14.1 11.9 - 14.6 %    PLATELET 022 502 - 009 K/uL    MPV 9.7 9.4 - 12.3 FL    ABSOLUTE NRBC 0.00 0.0 - 0.2 K/uL   METABOLIC PANEL, COMPREHENSIVE    Collection Time: 22 11:00 PM Result Value Ref Range    Sodium 140 136 - 145 mmol/L    Potassium 3.6 3.5 - 5.1 mmol/L    Chloride 105 98 - 107 mmol/L    CO2 28 21 - 32 mmol/L    Anion gap 7 7 - 16 mmol/L    Glucose 97 65 - 100 mg/dL    BUN 15 8 - 23 MG/DL    Creatinine 0.90 0.6 - 1.0 MG/DL    GFR est AA >60 >60 ml/min/1.73m2    GFR est non-AA >60 >60 ml/min/1.73m2    Calcium 8.9 8.3 - 10.4 MG/DL    Bilirubin, total 0.3 0.2 - 1.1 MG/DL    ALT (SGPT) 26 12 - 65 U/L    AST (SGOT) 29 15 - 37 U/L    Alk. phosphatase 79 50 - 136 U/L    Protein, total 6.6 6.3 - 8.2 g/dL    Albumin 3.2 3.2 - 4.6 g/dL    Globulin 3.4 2.3 - 3.5 g/dL    A-G Ratio 0.9 (L) 1.2 - 3.5     TROPONIN-HIGH SENSITIVITY    Collection Time: 02/03/22 11:00 PM   Result Value Ref Range    Troponin-High Sensitivity 22.2 (H) 0 - 14 pg/mL   COVID-19 RAPID TEST    Collection Time: 02/03/22 11:43 PM   Result Value Ref Range    Specimen source NASAL      COVID-19 rapid test Not detected NOTD     EKG, 12 LEAD, INITIAL    Collection Time: 02/03/22 11:47 PM   Result Value Ref Range    Ventricular Rate 67 BPM    Atrial Rate 67 BPM    P-R Interval 172 ms    QRS Duration 91 ms    Q-T Interval 383 ms    QTC Calculation (Bezet) 405 ms    Calculated P Axis 86 degrees    Calculated R Axis 75 degrees    Diagnosis       Sinus rhythm  EDUARDO, consider biatrial enlargement  Anteroseptal infarct, old  ST depression in inferolateral leads- consider ischemia    Confirmed by [facility]  MD ()RJ ([id number]) on 2/4/2022 4:53:47 PM     TROPONIN-HIGH SENSITIVITY    Collection Time: 02/04/22  1:53 AM   Result Value Ref Range    Troponin-High Sensitivity 42.3 (H) 0 - 14 pg/mL   MSSA/MRSA SC BY PCR, NASAL SWAB    Collection Time: 02/04/22  3:15 AM    Specimen: Nasal swab   Result Value Ref Range    Special Requests: NO SPECIAL REQUESTS      Culture result:        SA target not detected. A MRSA NEGATIVE, SA NEGATIVE test result does not preclude MRSA or SA nasal colonization. TYPE & SCREEN    Collection Time: 02/04/22  6:14 AM   Result Value Ref Range    Crossmatch Expiration 02/07/2022,2359     ABO/Rh(D) A NEGATIVE     Antibody screen NEG    HEMOGLOBIN    Collection Time: 02/04/22  8:25 PM   Result Value Ref Range    HGB 9.7 (L) 11.7 - 15.4 g/dL   PLEASE READ & DOCUMENT PPD TEST IN 24 HRS    Collection Time: 02/05/22  3:01 AM   Result Value Ref Range    PPD Negative Negative    mm Induration 0 0 - 5 mm   CBC W/O DIFF    Collection Time: 02/05/22  4:53 AM   Result Value Ref Range    WBC 7.6 4.3 - 11.1 K/uL    RBC 3.03 (L) 4.05 - 5.2 M/uL    HGB 8.8 (L) 11.7 - 15.4 g/dL    HCT 28.1 (L) 35.8 - 46.3 %    MCV 92.7 79.6 - 97.8 FL    MCH 29.0 26.1 - 32.9 PG    MCHC 31.3 (L) 31.4 - 35.0 g/dL    RDW 14.2 11.9 - 14.6 %    PLATELET 552 591 - 827 K/uL    MPV 9.9 9.4 - 12.3 FL    ABSOLUTE NRBC 0.00 0.0 - 0.2 K/uL   METABOLIC PANEL, COMPREHENSIVE    Collection Time: 02/05/22  4:53 AM   Result Value Ref Range    Sodium 139 136 - 145 mmol/L    Potassium 3.2 (L) 3.5 - 5.1 mmol/L    Chloride 106 98 - 107 mmol/L    CO2 28 21 - 32 mmol/L    Anion gap 5 (L) 7 - 16 mmol/L    Glucose 109 (H) 65 - 100 mg/dL    BUN 11 8 - 23 MG/DL    Creatinine 0.80 0.6 - 1.0 MG/DL    GFR est AA >60 >60 ml/min/1.73m2    GFR est non-AA >60 >60 ml/min/1.73m2    Calcium 8.6 8.3 - 10.4 MG/DL    Bilirubin, total 0.4 0.2 - 1.1 MG/DL    ALT (SGPT) 18 12 - 65 U/L    AST (SGOT) 18 15 - 37 U/L    Alk.  phosphatase 60 50 - 136 U/L    Protein, total 5.9 (L) 6.3 - 8.2 g/dL    Albumin 2.6 (L) 3.2 - 4.6 g/dL    Globulin 3.3 2.3 - 3.5 g/dL    A-G Ratio 0.8 (L) 1.2 - 3.5     PLEASE READ & DOCUMENT PPD TEST IN 48 HRS    Collection Time: 02/06/22  3:50 AM   Result Value Ref Range    PPD Negative Negative    mm Induration 0 0 - 5 mm   HEMOGLOBIN    Collection Time: 02/06/22  4:09 AM   Result Value Ref Range    HGB 9.0 (L) 11.7 - 48.0 g/dL   METABOLIC PANEL, COMPREHENSIVE    Collection Time: 02/06/22  4:09 AM   Result Value Ref Range    Sodium 142 136 - 145 mmol/L    Potassium 3.6 3.5 - 5.1 mmol/L    Chloride 108 (H) 98 - 107 mmol/L    CO2 24 21 - 32 mmol/L    Anion gap 10 7 - 16 mmol/L    Glucose 157 (H) 65 - 100 mg/dL    BUN 9 8 - 23 MG/DL    Creatinine 0.70 0.6 - 1.0 MG/DL    GFR est AA >60 >60 ml/min/1.73m2    GFR est non-AA >60 >60 ml/min/1.73m2    Calcium 8.8 8.3 - 10.4 MG/DL    Bilirubin, total 0.4 0.2 - 1.1 MG/DL    ALT (SGPT) 17 12 - 65 U/L    AST (SGOT) 27 15 - 37 U/L    Alk.  phosphatase 66 50 - 136 U/L    Protein, total 6.5 6.3 - 8.2 g/dL    Albumin 2.8 (L) 3.2 - 4.6 g/dL    Globulin 3.7 (H) 2.3 - 3.5 g/dL    A-G Ratio 0.8 (L) 1.2 - 3.5     TROPONIN-HIGH SENSITIVITY    Collection Time: 02/06/22  5:29 AM   Result Value Ref Range    Troponin-High Sensitivity 676.4 (HH) 0 - 14 pg/mL   EKG, 12 LEAD, INITIAL    Collection Time: 02/06/22  5:31 AM   Result Value Ref Range    Ventricular Rate 154 BPM    Atrial Rate 154 BPM    P-R Interval 162 ms    QRS Duration 94 ms    Q-T Interval 288 ms    QTC Calculation (Bezet) 461 ms    Calculated P Axis 77 degrees    Calculated R Axis 59 degrees    Calculated T Axis -130 degrees    Diagnosis       Sinus tachycardia  ST depression, consider subendocardial injury  Abnormal ECG    Confirmed by Alejandrina Garza ([id number]) on 2/6/2022 8:24:42 AM     CBC W/O DIFF    Collection Time: 02/06/22  6:36 AM   Result Value Ref Range    WBC 12.1 (H) 4.3 - 11.1 K/uL    RBC 3.06 (L) 4.05 - 5.2 M/uL    HGB 9.0 (L) 11.7 - 15.4 g/dL    HCT 28.3 (L) 35.8 - 46.3 %    MCV 92.5 79.6 - 97.8 FL    MCH 29.4 26.1 - 32.9 PG    MCHC 31.8 31.4 - 35.0 g/dL    RDW 14.2 11.9 - 14.6 %    PLATELET 031 531 - 824 K/uL    MPV 9.9 9.4 - 12.3 FL    ABSOLUTE NRBC 0.00 0.0 - 0.2 K/uL   D DIMER    Collection Time: 02/06/22  6:36 AM   Result Value Ref Range    D DIMER 7.02 (H) <0.56 ug/ml(FEU)   METABOLIC PANEL, BASIC    Collection Time: 02/06/22  6:36 AM   Result Value Ref Range    Sodium 141 136 - 145 mmol/L    Potassium 3.7 3.5 - 5.1 mmol/L    Chloride 109 (H) 98 - 107 mmol/L    CO2 26 21 - 32 mmol/L    Anion gap 6 (L) 7 - 16 mmol/L    Glucose 195 (H) 65 - 100 mg/dL    BUN 10 8 - 23 MG/DL    Creatinine 0.80 0.6 - 1.0 MG/DL    GFR est AA >60 >60 ml/min/1.73m2    GFR est non-AA >60 >60 ml/min/1.73m2    Calcium 9.0 8.3 - 10.4 MG/DL   BLOOD GAS, ARTERIAL POC    Collection Time: 02/06/22  6:54 AM   Result Value Ref Range    Device: Non rebreather      FIO2 (POC) 100 %    pH (POC) 7.32 (L) 7.35 - 7.45      pCO2 (POC) 46.3 (H) 35 - 45 MMHG    pO2 (POC) 95 75 - 100 MMHG    HCO3 (POC) 23.8 22 - 26 MMOL/L    sO2 (POC) 96.7 95 - 98 %    Base deficit (POC) 2.4 mmol/L    Allens test (POC) Positive      Total resp.  rate 20      Site RIGHT RADIAL      Specimen type (POC) ARTERIAL      Performed by [employer]     Respiratory comment: 15L    EKG, 12 LEAD, SUBSEQUENT    Collection Time: 02/06/22  7:24 AM   Result Value Ref Range    Ventricular Rate 112 BPM    Atrial Rate 112 BPM    P-R Interval 150 ms    QRS Duration 82 ms    Q-T Interval 354 ms    QTC Calculation (Bezet) 483 ms    Calculated P Axis 85 degrees    Calculated R Axis 60 degrees    Calculated T Axis -134 degrees    Diagnosis       Sinus tachycardia  Marked ST abnormality, possible inferior subendocardial injury  Abnormal ECG    Confirmed by Connor Mejia ([id number]) on 2/6/2022 8:25:13 AM     ECHO ADULT COMPLETE    Collection Time: 02/06/22  9:02 AM   Result Value Ref Range    LV EDV A2C 66 mL    LV EDV A4C 66 mL    LV EDV BP 66 56 - 104 mL    LV ESV A2C 33 mL    LV ESV A4C 30 mL    IVSd 0.8 0.6 - 0.9 cm    LVIDd 4.3 3.9 - 5.3 cm    LVIDs 3.0 cm    LVOT Diameter 1.9 cm    LVOT Mean Gradient 1 mmHg    LVOT VTI 16.2 cm    LVOT Peak Velocity 0.9 m/s    LVOT Peak Gradient 3 mmHg    LVPWd 1.1 (A) 0.6 - 0.9 cm    LV E' Lateral Velocity 7 cm/s    LV E' Septal Velocity 6 cm/s    LV Ejection Fraction A2C 51 %    LV Ejection Fraction A4C 55 %    EF BP 52 (A) 55 - 100 %    LVOT Area 2.8 cm2    LVOT SV 45.9 ml    LA Minor Axis 5.1 cm    LA Major [place] 4.3 cm    LA Area 2C 15.6 cm2    LA Area 4C 13.0 cm2    LA Volume BP 37 22 - 52 mL    LA Diameter 2.5 cm    AR .0 ms    AR Max Velocity PISA 3.7 m/s    AV Peak Velocity 1.2 m/s    AV Peak Gradient 6 mmHg    AV Mean Velocity 0.8 m/s    AV Mean Gradient 3 mmHg    AV VTI 21.5 cm    AV Area by VTI 2.1 cm2    AV Area by Peak Velocity 2.0 cm2    Aortic Root 3.0 cm    Ascending Aorta 3.0 cm    MV Nyquist Velocity 37 cm/s    MR Radius PISA 0.40 cm    MR .0 cm    MR Peak Velocity 5.3 m/s    MR Peak Gradient 112 mmHg    MV E Wave Deceleration Time 114.0 ms    MV A Velocity 0.52 m/s    MV E Velocity 1.07 m/s    MV EROA PISA 7.0 cm2    RVIDd 2.0 cm    TAPSE 1.8 1.5 - 2.0 cm    TR Max Velocity 2.38 m/s    TR Peak Gradient 23 mmHg    Fractional Shortening 2D 30 28 - 44 %    LV EDV Index BP 36 mL/m2    LV ESV Index A4C 17 mL/m2    LV EDV Index A4C 36 mL/m2    LV ESV Index A2C 18 mL/m2    LV EDV Index A2C 36 mL/m2    LVIDd Index 2.38 cm/m2    LVIDs Index 1.66 cm/m2    LV RWT Ratio 0.51     LV Mass 2D 132.7 67 - 162 g    LV Mass 2D Index 73.3 43 - 95 g/m2    MV E/A 2.06     E/E' Ratio (Averaged) 16.56     E/E' Lateral 15.29     E/E' Septal 17.83     LA Volume Index BP 20 16 - 34 ml/m2    LVOT Stroke Volume Index 25.4 mL/m2    LA Size Index 1.38 cm/m2    LA/AO Root Ratio 0.83     Ao Root Index 1.66 cm/m2    Ascending Aorta Index 1.66 cm/m2    AV Velocity Ratio 0.75     LVOT:AV VTI Index 0.75     CHITO/BSA VTI 1.2 cm2/m2    CHITO/BSA Peak Velocity 1.1 cm2/m2    MR Regurg Volume PISA 1,129.36 mL    Est. RA Pressure 3 mmHg    RVSP 26 mmHg    LA Volume 2C 38 22 - 52 mL    LA Volume Index 2C 21 16 - 34 mL/m2    LA Volume 4C 31 22 - 52 mL    LA Volume Index 4C 17 16 - 34 mL/m2       Functional Exam:   Per PT: Pt required Mod (A) for sit-to-supine/ sit-to-stand and Min (A) c use of RW for transfer to bed/ brief 3-4 steps.  Upon standing, pt reporting dizziness that was unchanged c inc time in standing; pt able to remain steady within RW and RN present at time of transfer. This session, pt was able to take 3-4 steps although she again elected to use heel-to-toe pivot strategy vs taking actual steps. She remains significantly limited in her ability to tolerate WB on R LE d/t pain. Pt requiring intermittent Min (A) for AD negotiation when turning to sit on EOB. Pt then (A) back to supine c mod (A) at B LE. Per OT: Pt overall min A x2 RW and increased time for functional transfers in prep for ADLs. Pt max A to don socks and brief EOB and in standing. Pt is currently limited by pain and fear of putting weight through R LE. Pt requires max encouragement for every step and movement taken while in standing with RW. Functional Assessment:  Functional Assessment  Baseline Functional Status: Ambulated independently  Fall in Past 12 Months: Yes  Fall With Injury: Yes (Describe)  Number of Falls Within 12 Months: 1  Decline in Gait/Transfer/Balance: Yes (comment) (compression fx, sob)  Decline in Capacity to Feed/Dress/Bathe: No  Developmental Delay: No  Chewing/Swallowing Problems: No  Difficulty with Secretions: No  Speech Slurred/Thick/Garbled: No     Ambulation:  Activity and Safety  Activity Level:  Up with Assistance  Ambulate: No (Comment)  Activity: In bed,Resting quietly  Activity Assistance: Partial (two people)  Weight Bearing Status: WBAT (Weight Bearing as Tolerated)  Mode of Transportation: Stretcher  Patient Turned: Turns self  Head of Bed Elevated: Self regulated  Assistive Device: Fall prevention device  Safety Measures: Bed/Chair alarm on,Bed/Chair-Wheels locked,Bed in low position,Call light within reach,Fall prevention (comment),Gripper socks,Side rails X2     Impression / Assessment:     Principal Problem:    Other fracture of right femur, initial encounter for closed fracture ([address] (2/4/2022)    Active Problems:    COPD, very severe ([address] (8/20/2013)      Hyperlipidemia (10/10/2016)      Essential hypertension, benign (10/10/2016)      CAD S/P percutaneous coronary angioplasty (5/31/2019)      Closed right hip fracture ([address] (2/4/2022)      Plan / Recommendations / Medical Decision Making:     Recommendations:   Continue Acute Rehab Program  Coordination of rehab / medical care  Counseling of PM&R care issues management  Monitoring and management of medical conditions per plan of care / orders     · Patient with right femur intertrochanteric fracture now s/p gamma nail insertion (2/4/2022, WBAT) on background of CAD s/p PCI, O2- and steroid-dependent COPD, HTN and DDD with recent T12 compression fracture  · Ongoing work-up for POD #2 symptoms of respiratory distress, tachycardia and substernal chest discomfort; pending TTE, PE evaluation  · Will discuss patient with Jennifer  Director and admissions coordinators    Discussion with Family / Caregiver / Staff    Reviewed Therapies / Mazin Elias / Mehran Alba / Records    Thank you very much for this referral. We appreciate the opportunity to participate in this patient's care. We will continue to follow. Chantel Davidson PA-C  Physician Assistant with [facility]  Anh Burrell MD, Medical Director

## 2022-02-06 NOTE — PROGRESS NOTES
Pt. Requested nitroglycerin - entered room to find pt. Stating, \"I'm having trouble breathing. \" O2 86-88% on 1L, turned up to 10L and O2 now 94. HR in 120's. /75. Nitro given at 3941. Pt. Requesting breathing treatment. Pt. states, \"albuterol should do the trick. \" Pt. Encouraged to take deep breaths as anxiety seems to currently be a factor. Albuterol 2.5mg ordered per Errol Hdez MD order. Respiratory at bedside to administer. 1mg ativan PO one time dose given for anxiety.

## 2022-02-06 NOTE — PROGRESS NOTES
Physical Therapy Note:     AM: PT on hold per RN as patient is currently requiring BIPAP at rest. Will try back at later time/ date pending pt status and availability.      PM: Continuing to hold PT at time of PM attempt d/t ongoing chest pain and discomfort at rest.    Thank You,  Trip Brown PT, DPT, CSCS

## 2022-02-06 NOTE — PROGRESS NOTES
OT attempted to see pt in am.  Pt was on bi-pap and having chest pain. OT attempted in pm. Pt was put on hold due to chest pain. Will attempt at another date/time as schedule permits.      Thank you   HANNAH Huertas/MARIUSZ

## 2022-02-06 NOTE — CONSULTS
History of present illness:71 y.o. female history of coronary artery disease COPD status post right hip fracture with right gamma nail insertion 2/4/2022. Patient began to have respiratory distress as well as complaints of substernal chest discomfort 2/6/2022. She was assessed by her primary team with ECG showing sinus tachycardia with ST depression. Chest radiograph revealing no significant infiltrate. Patient was placed on noninvasive ventilation with improvement of symptoms and tachycardia. At the time of exam patient on BiPAP therapy. 1. History of PCI to RCA 2019  2. Echocardiogram 6/2021 ejection fraction 50 to 55% no major valvular disease  3. EKG 2/6/2022 sinus tachycardia horizontal ST depression noted in lateral leads        Assessment  Hypoxemic respiratory failure   1. patient with improvement on BiPAP therapy. Initially there was concern for acute coronary syndrome at the time of patient's initial presentation this evening. She was given aspirin weight-based bolus of heparin therapy with additional IV Lasix. 2. ECG reviewed with ST depression noted. EKG not consistent with acute ST elevation MI.  3. Discussed differential diagnosis with primary team of concern is pulmonary embolus due to relatively normal chest radiograph and hypoxia tachycardia etc.   4. ASAP echocardiogram will be performed as well. Coronary disease  1. ECG abnormalities described above likely supply demand mismatch in the setting of tachycardia and hypoxemic respiratory failure  2. Patient will be monitored closely echocardiogram as above  3. No indications for urgent cardiac catheterization at this time    Sinus tachycardia  1. ECG with no evidence of atrial fibrillation or atrial flutter at this time  2. Rates have improved with initiation of positive pressure ventilation                  Review of Systems   Constitutional: Negative for fever.      Past Medical History:   Diagnosis Date    Arrhythmia     Arthritis  CAD (coronary artery disease) , 2013    PCI,  Mary Galindo     Carotid stenosis, right     endarterectomy 19    Chronic anxiety 2017    COPD     recent referral to [facility] Dr Uli Zayas for lung transplant    Emphysema lung ([address]     GERD (gastroesophageal reflux disease)     controlled with medication    History of echocardiogram 2019    History of echocardiogram     echo 19 LVEF 55-60%    Hypertension     Nausea & vomiting     Oxygen dependent     1 liter at night    Pleurisy     Thyroid disease     pt denies     Past Surgical History:   Procedure Laterality Date    HX GYN      rebuilt cervix    HX HEART CATHETERIZATION  2019    last stent- per patient- 10 stents total    HX OTHER SURGICAL      HX TONSIL AND ADENOIDECTOMY      VASCULAR SURGERY PROCEDURE UNLIST Right 2019    carotid endarterectomy     Family History   Problem Relation Age of Onset    No Known Problems Mother         adopted     Social History     Socioeconomic History    Marital status:      Spouse name: Not on file    Number of children: Not on file    Years of education: Not on file    Highest education level: Not on file   Occupational History    Occupation: [employer]     Employer: SELF EMPLOYED     Comment: home   Tobacco Use    Smoking status: Former Smoker     Packs/day: 0.75     Years: 53.00     Pack years: 39.75     Types: Cigarettes     Quit date: 2018     Years since quittin.1    Smokeless tobacco: Never Used   Substance and Sexual Activity    Alcohol use: Yes     Comment: rarely    Drug use: No    Sexual activity: Not on file   Other Topics Concern     Service Not Asked    Blood Transfusions Not Asked    Caffeine Concern Not Asked    Occupational Exposure Not Asked    Hobby Hazards Not Asked    Sleep Concern Not Asked    Stress Concern Not Asked    Weight Concern Not Asked    Special Diet Not Asked    Back Care Not Asked    Exercise Not Asked    Bike Helmet Not Asked   [address] Not Asked    Self-Exams Not Asked   Social History Narrative    , has 2 children, 1 step child. 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      Social Determinants of Health     Financial Resource Strain:     Difficulty of Paying Living Expenses: Not on file   Food Insecurity:     Worried About [address] in the Last Year: Not on file    [address] in the Last Year: Not on file   Transportation Needs:     Lack of Transportation (Medical): Not on file    Lack of Transportation (Non-Medical):  Not on file   Physical Activity:     Days of Exercise per Week: Not on file    Minutes of Exercise per Session: Not on file   Stress:     Feeling of Stress : Not on file   Social Connections:     Frequency of Communication with Friends and Family: Not on file    Frequency of Social Gatherings with Friends and Family: Not on file    Attends Hoahaoism Services: Not on file    Active Member of [address] [place] [address] or Organizations: Not on file    Attends Club or Organization Meetings: Not on file    Marital Status: Not on file   Intimate Partner Violence:     Fear of Current or Ex-Partner: Not on file    Emotionally Abused: Not on file    Physically Abused: Not on file    Sexually Abused: Not on file   Housing Stability:     Unable to Pay for Housing in the Last Year: Not on file    Number of Places Lived in the Last Year: Not on file    Unstable Housing in the Last Year: Not on file     Current Facility-Administered Medications   Medication Dose Route Frequency    albuterol-ipratropium (DUO-NEB) 2.5 MG-0.5 MG/3 ML  3 mL Nebulization Q6H PRN    nitroglycerin (NITROSTAT) tablet 0.4 mg  0.4 mg SubLINGual PRN    atorvastatin (LIPITOR) tablet 80 mg  80 mg Oral QHS    aspirin delayed-release tablet 81 mg  81 mg Oral DAILY    budesonide-formoteroL (SYMBICORT) 160-4.5 mcg/actuation HFA inhaler 2 Puff  2 Puff Inhalation BID RT    metoprolol succinate (TOPROL-XL) XL tablet 100 mg  100 mg Oral DAILY    pantoprazole (PROTONIX) tablet 40 mg  40 mg Oral BID    lisinopriL (PRINIVIL, ZESTRIL) tablet 5 mg  5 mg Oral DAILY    tiotropium bromide (SPIRIVA RESPIMAT) 2.5 mcg /actuation  2 Puff Inhalation DAILY    torsemide (DEMADEX) tablet 10 mg  10 mg Oral DAILY    predniSONE (DELTASONE) tablet 10 mg  10 mg Oral DAILY WITH BREAKFAST    alcohol 62% (NOZIN) nasal  1 Ampule  1 Ampule Topical Q12H    sodium chloride (NS) flush 5-40 mL  5-40 mL IntraVENous Q8H    sodium chloride (NS) flush 5-40 mL  5-40 mL IntraVENous PRN    acetaminophen (TYLENOL) tablet 1,000 mg  1,000 mg Oral Q6H    oxyCODONE IR (ROXICODONE) tablet 10 mg  10 mg Oral Q4H PRN    naloxone (NARCAN) injection 0.2-0.4 mg  0.2-0.4 mg IntraVENous Q10MIN PRN    senna-docusate (PERICOLACE) 8.6-50 mg per tablet 2 Tablet  2 Tablet Oral DAILY    ondansetron (ZOFRAN) injection 4 mg  4 mg IntraVENous Q4H PRN     Visit Vitals  BP (!) 120/58   Pulse (!) 110   Temp 98.3 °F (36.8 °C)   Resp 18   Ht 5' 8\" (1.727 m)   Wt 150 lb (68 kg)   SpO2 100%   BMI 22.81 kg/m²     Physical Exam  Constitutional:       Appearance: She is well-developed. She is not diaphoretic. HENT:      Head: Normocephalic and atraumatic. Mouth/Throat:      Pharynx: No oropharyngeal exudate. Eyes:      General: No scleral icterus. Right eye: No discharge. Left eye: No discharge. Conjunctiva/sclera: Conjunctivae normal.      Pupils: Pupils are equal, round, and reactive to light. Neck:      Vascular: No JVD. Trachea: No tracheal deviation. Cardiovascular:      Rate and Rhythm: Tachycardia present. Heart sounds: Normal heart sounds. No murmur heard. Pulmonary:      Effort: Pulmonary effort is normal.      Breath sounds: No wheezing. Abdominal:      General: There is no distension. Palpations: There is no mass. Tenderness: There is no abdominal tenderness.  There is no guarding or rebound. Musculoskeletal:         General: No tenderness. Normal range of motion. Cervical back: Normal range of motion. Skin:     General: Skin is warm and dry. Coloration: Skin is not pale. Findings: No rash. Neurological:      General: No focal deficit present. Mental Status: She is alert. Cranial Nerves: No cranial nerve deficit. Intake/Output Summary (Last 24 hours) at 2/6/2022 0719  Last data filed at 2/5/2022 1549  Gross per 24 hour   Intake --   Output 400 ml   Net -400 ml     Lab Results   Component Value Date/Time    Sodium 142 02/06/2022 04:09 AM    Potassium 3.6 02/06/2022 04:09 AM    Chloride 108 (H) 02/06/2022 04:09 AM    CO2 24 02/06/2022 04:09 AM    Anion gap 10 02/06/2022 04:09 AM    Glucose 157 (H) 02/06/2022 04:09 AM    BUN 9 02/06/2022 04:09 AM    Creatinine 0.70 02/06/2022 04:09 AM    BUN/Creatinine ratio 12 08/30/2018 10:13 AM    GFR est AA >60 02/06/2022 04:09 AM    GFR est non-AA >60 02/06/2022 04:09 AM    Calcium 8.8 02/06/2022 04:09 AM     Lab Results   Component Value Date/Time    WBC 12.1 (H) 02/06/2022 06:36 AM    HGB (POC) 12.7 10/03/2016 08:44 AM    HGB 9.0 (L) 02/06/2022 06:36 AM    HCT (POC) 38.9 10/03/2016 08:44 AM    HCT 28.3 (L) 02/06/2022 06:36 AM    PLATELET 921 68/91/5053 06:36 AM    MCV 92.5 02/06/2022 06:36 AM     Lab Results   Component Value Date/Time    Troponin-I, Qt. 0.16 (HH) 06/07/2019 03:52 PM     (H) 06/06/2019 06:57 PM     08/21/2013 06:25 PM     No results found for: HBA1C, NFG4BLUC, [id number]      Assessment:    ICD-10-CM ICD-9-CM    1. Closed fracture of right [facility]  S72.001A 820.8    2. CAD S/P percutaneous coronary angioplasty  I25.10 414.01     Z98.61 V45.82    3. Essential hypertension, benign  I10 401.1    4. Mixed hyperlipidemia  E78.2 272.2    5.  COPD, very severe ([facility]  J44.9 496        Recommendations:

## 2022-02-06 NOTE — PROGRESS NOTES
Hospitalist Progress Note   Admit Date:  2/3/2022 10:44 PM   Name:  Jimmy Sarabia   Age:  66 y.o. Sex:  female  :  1950   MRN:  [id number]   Room:  732/01    Presenting Complaint: Hip Injury    Reason(s) for Admission: Other fracture of right femur, initial encounter for closed fracture [facility]) [address] Interval History:   Jimmy Sarabia is a 66 y.o. female with medical history of oxygen and steroid dependent COPD, CAD s/p PCI in 2019 (stable cardiac status since that time),  HTN, who presented to ED after she bent over and lost her balance. She ended up falling backwards, hitting her hip. Patient has severe right hip and thigh pain.  She was unable to ambulate.  EMS has brought her for evaluation and is given her a total of 10 of morphine prior to arrival.  Patient denies headache head injury neck pain or back pain.  Isolated injury to right hip and thigh area.   Workup in ER revealed impacted right femur intertrochanteric fracture. Other labs look stable  She denies any other symptoms at this time, specifically denies CP or SOB. NO kidney or liver disease. She does use oxygen especially at night. She is dependent on steroids    Subjective (22): Patient seen and evaluated. Overnight events noted. Patient is currently doing well on BiPAP  Lethargic and sedated from Ativan &  Morphine  Per the patient she been having chest pain all night long. EKG was done by the on-call showed ST depression that is not new. Seen by cardiology and suspect demand ischemia possible concern for PE as well given the dyspnea. She was placed on oxygen for the work of breathing. Concern for anxiety as well. Troponins are elevated and being trended  Her blood pressure medications were held yesterday secondary to soft blood pressures per nursing.     Assessment & Plan:   Other fracture of right femur, initial encounter for closed fracture ([address] (2/4/2022)  2/6/2022  Patient is status post surgery on February 4, 2022  Further management per orthopedics  Patient being evaluated by PT and OT already  A PPD has been placed  Will consult case management for discharge planning  Resume Plavix postop per Ortho recommendations  Continue remote telemetry due to history of coronary artery disease    COPD  2/6/2022  Continue with home oxygen requirements  Continue steroids  Continue nebs  Dr. Queen Garcia pulmonologist if needed    Hyperlipidemia  2/6/2022  Continue home medication    Essential hypertension  2/6/2022  Continue home medication    Coronary artery disease status post percutaneous coronary angioplasty  2/6/2022  Plavix on hold, continue aspirin  Reinstate Plavix when okay per Ortho  Continue beta-blocker, ACE, statin  Restart her home as needed nitroglycerin      Chest pain this a.m. with concern for acute coronary syndrome versus a PE versus anxiety  Patient seen and evaluated by cardiology-there are less concern for ACS as opposed to PE or underlying anxiety  Continue on remote telemetry for now  Continue to monitor her troponins  Continue current medical management-patient got Cardizem this a.m.; blood pressures are soft-so Toprol 50 mg p.o. daily given instead of 100mg p.o. daily  Continue BiPAP and wean off as tolerated-the BiPAP was started because of her work of breathing not secondary to actual hypoxia. CT chest for PE pending  Echocardiogram is pending    Sinus tachycardia  Patient was given Cardizem  Home Lopressor reinstated          Dispo/Discharge Planning:    Pending clinical course. PT/OT. Discharge to rehab per the recommendations.     Diet:  ADULT DIET Regular  DVT PPx: Per Ortho  Code status: Full Code    Hospital Problems as of 2/6/2022 Date Reviewed: 11/9/2021          Codes Class Noted - Resolved POA    Closed right hip fracture [facility]) ICD-10-CM: S72.001A  ICD-9-CM: 820.8  2/4/2022 - Present Unknown        * (Principal) Other fracture of right femur, initial encounter for closed fracture [facility]) ICD-10-CM: J88.7Q9E  ICD-9-CM: 821.00  2/4/2022 - Present Unknown        CAD S/P percutaneous coronary angioplasty ICD-10-CM: I25.10, Z98.61  ICD-9-CM: 414.01, V45.82  5/31/2019 - Present Yes        Hyperlipidemia ICD-10-CM: E78.5  ICD-9-CM: 272.4  10/10/2016 - Present Yes        Essential hypertension, benign ICD-10-CM: I10  ICD-9-CM: 401.1  10/10/2016 - Present Yes        COPD, very severe ([facility] [address] ICD-10-CM: J44.9  ICD-9-CM: 009  8/20/2013 - Present Yes              Objective:     Patient Vitals for the past 24 hrs:   Temp Pulse Resp BP SpO2   02/06/22 1059 97.9 °F (36.6 °C) 96 22 (!) 90/59 100 %   02/06/22 0946 -- -- -- -- 95 %   02/06/22 0901 -- -- -- (!) 110/56 --   02/06/22 0829 -- (!) 102 19 (!) 110/56 100 %   02/06/22 0707 -- -- -- -- 100 %   02/06/22 0659 -- (!) 110 -- (!) 120/58 --   02/06/22 0656 -- (!) 108 -- (!) 105/55 --   02/06/22 0652 -- (!) 125 -- 118/70 --   02/06/22 0614 -- -- -- -- 100 %   02/06/22 0553 -- -- -- -- 100 %   02/06/22 0456 -- (!) 146 -- (!) 118/99 --   02/06/22 0331 98.3 °F (36.8 °C) (!) 107 18 134/81 96 %   02/06/22 0116 -- -- -- 138/78 98 %   02/06/22 0110 -- -- -- -- 99 %   02/06/22 0000 -- (!) 111 -- (!) 147/75 --   02/05/22 2344 97.4 °F (36.3 °C) 96 16 124/66 96 %   02/05/22 2000 -- 100 -- -- --   02/05/22 1946 -- -- -- -- 98 %   02/05/22 1927 97.5 °F (36.4 °C) 93 16 114/60 99 %   02/05/22 1827 -- 98 -- (!) 151/79 100 %   02/05/22 1554 98.2 °F (36.8 °C) 98 18 (!) 99/55 94 %   02/05/22 1321 -- -- -- -- 95 %   02/05/22 1241 -- 89 -- (!) 127/58 100 %   02/05/22 1139 97.7 °F (36.5 °C) 90 18 (!) 92/51 93 %     Oxygen Therapy  O2 Sat (%): 100 % (02/06/22 1059)  Pulse via Oximetry: 87 beats per minute (02/06/22 0946)  O2 Device: Hi flow nasal cannula (02/06/22 0946)  Skin Assessment: Clean, dry, & intact (02/06/22 0946)  O2 Flow Rate (L/min): 4 l/min (02/06/22 0946)  FIO2 (%): 70 % (02/06/22 0707)    Estimated body mass index is 22.81 kg/m² as calculated from the following:    Height as of this encounter: 5' 8\" (1.727 m). Weight as of this encounter: 68 kg (150 lb). Intake/Output Summary (Last 24 hours) at 2/6/2022 1118  Last data filed at 2/5/2022 1549  Gross per 24 hour   Intake --   Output 400 ml   Net -400 ml         Physical Exam:   Blood pressure (!) 90/59, pulse 96, temperature 97.9 °F (36.6 °C), resp. rate 22, height 5' 8\" (1.727 m), weight 68 kg (150 lb), SpO2 100 %. General:    Well nourished. No overt distress  Head:  Normocephalic, atraumatic  Eyes:  Sclerae appear normal.  Pupils equally round. ENT:  Nares appear normal, no drainage. Moist oral mucosa  Neck:  No restricted ROM. Trachea midline   CV:   RRR. No m/r/g. No jugular venous distension. Lungs:   CTAB. No wheezing, rhonchi, or rales. Respirations even, unlabored  Abdomen: Bowel sounds present. Soft, nontender, nondistended. Extremities: No cyanosis or clubbing. No edema  Skin:     No rashes and normal coloration. Warm and dry. Neuro:  CN II-XII grossly intact. Sensation intact. A&Ox3  Psych:  Normal mood and affect.       I have reviewed ordered lab tests and independently visualized imaging below:    Recent Labs:  Recent Results (from the past 48 hour(s))   HEMOGLOBIN    Collection Time: 02/04/22  8:25 PM   Result Value Ref Range    HGB 9.7 (L) 11.7 - 15.4 g/dL   PLEASE READ & DOCUMENT PPD TEST IN 24 HRS    Collection Time: 02/05/22  3:01 AM   Result Value Ref Range    PPD Negative Negative    mm Induration 0 0 - 5 mm   CBC W/O DIFF    Collection Time: 02/05/22  4:53 AM   Result Value Ref Range    WBC 7.6 4.3 - 11.1 K/uL    RBC 3.03 (L) 4.05 - 5.2 M/uL    HGB 8.8 (L) 11.7 - 15.4 g/dL    HCT 28.1 (L) 35.8 - 46.3 %    MCV 92.7 79.6 - 97.8 FL    MCH 29.0 26.1 - 32.9 PG    MCHC 31.3 (L) 31.4 - 35.0 g/dL    RDW 14.2 11.9 - 14.6 %    PLATELET 521 644 - 721 K/uL    MPV 9.9 9.4 - 12.3 FL    ABSOLUTE NRBC 0.00 0.0 - 0.2 K/uL   METABOLIC PANEL, COMPREHENSIVE    Collection Time: 02/05/22  4:53 AM   Result Value Ref Range    Sodium 139 136 - 145 mmol/L    Potassium 3.2 (L) 3.5 - 5.1 mmol/L    Chloride 106 98 - 107 mmol/L    CO2 28 21 - 32 mmol/L    Anion gap 5 (L) 7 - 16 mmol/L    Glucose 109 (H) 65 - 100 mg/dL    BUN 11 8 - 23 MG/DL    Creatinine 0.80 0.6 - 1.0 MG/DL    GFR est AA >60 >60 ml/min/1.73m2    GFR est non-AA >60 >60 ml/min/1.73m2    Calcium 8.6 8.3 - 10.4 MG/DL    Bilirubin, total 0.4 0.2 - 1.1 MG/DL    ALT (SGPT) 18 12 - 65 U/L    AST (SGOT) 18 15 - 37 U/L    Alk. phosphatase 60 50 - 136 U/L    Protein, total 5.9 (L) 6.3 - 8.2 g/dL    Albumin 2.6 (L) 3.2 - 4.6 g/dL    Globulin 3.3 2.3 - 3.5 g/dL    A-G Ratio 0.8 (L) 1.2 - 3.5     PLEASE READ & DOCUMENT PPD TEST IN 48 HRS    Collection Time: 02/06/22  3:50 AM   Result Value Ref Range    PPD Negative Negative    mm Induration 0 0 - 5 mm   HEMOGLOBIN    Collection Time: 02/06/22  4:09 AM   Result Value Ref Range    HGB 9.0 (L) 11.7 - 40.5 g/dL   METABOLIC PANEL, COMPREHENSIVE    Collection Time: 02/06/22  4:09 AM   Result Value Ref Range    Sodium 142 136 - 145 mmol/L    Potassium 3.6 3.5 - 5.1 mmol/L    Chloride 108 (H) 98 - 107 mmol/L    CO2 24 21 - 32 mmol/L    Anion gap 10 7 - 16 mmol/L    Glucose 157 (H) 65 - 100 mg/dL    BUN 9 8 - 23 MG/DL    Creatinine 0.70 0.6 - 1.0 MG/DL    GFR est AA >60 >60 ml/min/1.73m2    GFR est non-AA >60 >60 ml/min/1.73m2    Calcium 8.8 8.3 - 10.4 MG/DL    Bilirubin, total 0.4 0.2 - 1.1 MG/DL    ALT (SGPT) 17 12 - 65 U/L    AST (SGOT) 27 15 - 37 U/L    Alk.  phosphatase 66 50 - 136 U/L    Protein, total 6.5 6.3 - 8.2 g/dL    Albumin 2.8 (L) 3.2 - 4.6 g/dL    Globulin 3.7 (H) 2.3 - 3.5 g/dL    A-G Ratio 0.8 (L) 1.2 - 3.5     TROPONIN-HIGH SENSITIVITY    Collection Time: 02/06/22  5:29 AM   Result Value Ref Range    Troponin-High Sensitivity 676.4 (HH) 0 - 14 pg/mL   EKG, 12 LEAD, INITIAL    Collection Time: 02/06/22  5:31 AM   Result Value Ref Range    Ventricular Rate 154 BPM    Atrial Rate 154 BPM    P-R Interval 162 ms    QRS Duration 94 ms    Q-T Interval 288 ms    QTC Calculation (Bezet) 461 ms    Calculated P Axis 77 degrees    Calculated R Axis 59 degrees    Calculated T Axis -130 degrees    Diagnosis       Sinus tachycardia  ST depression, consider subendocardial injury  Abnormal ECG    Confirmed by Praveen Alfaro ([id number]) on 2/6/2022 8:24:42 AM     CBC W/O DIFF    Collection Time: 02/06/22  6:36 AM   Result Value Ref Range    WBC 12.1 (H) 4.3 - 11.1 K/uL    RBC 3.06 (L) 4.05 - 5.2 M/uL    HGB 9.0 (L) 11.7 - 15.4 g/dL    HCT 28.3 (L) 35.8 - 46.3 %    MCV 92.5 79.6 - 97.8 FL    MCH 29.4 26.1 - 32.9 PG    MCHC 31.8 31.4 - 35.0 g/dL    RDW 14.2 11.9 - 14.6 %    PLATELET 371 736 - 989 K/uL    MPV 9.9 9.4 - 12.3 FL    ABSOLUTE NRBC 0.00 0.0 - 0.2 K/uL   D DIMER    Collection Time: 02/06/22  6:36 AM   Result Value Ref Range    D DIMER 7.02 (H) <0.56 ug/ml(FEU)   METABOLIC PANEL, BASIC    Collection Time: 02/06/22  6:36 AM   Result Value Ref Range    Sodium 141 136 - 145 mmol/L    Potassium 3.7 3.5 - 5.1 mmol/L    Chloride 109 (H) 98 - 107 mmol/L    CO2 26 21 - 32 mmol/L    Anion gap 6 (L) 7 - 16 mmol/L    Glucose 195 (H) 65 - 100 mg/dL    BUN 10 8 - 23 MG/DL    Creatinine 0.80 0.6 - 1.0 MG/DL    GFR est AA >60 >60 ml/min/1.73m2    GFR est non-AA >60 >60 ml/min/1.73m2    Calcium 9.0 8.3 - 10.4 MG/DL   BLOOD GAS, ARTERIAL POC    Collection Time: 02/06/22  6:54 AM   Result Value Ref Range    Device: Non rebreather      FIO2 (POC) 100 %    pH (POC) 7.32 (L) 7.35 - 7.45      pCO2 (POC) 46.3 (H) 35 - 45 MMHG    pO2 (POC) 95 75 - 100 MMHG    HCO3 (POC) 23.8 22 - 26 MMOL/L    sO2 (POC) 96.7 95 - 98 %    Base deficit (POC) 2.4 mmol/L    Allens test (POC) Positive      Total resp.  rate 20      Site RIGHT RADIAL      Specimen type (POC) ARTERIAL      Performed by Joel Cedillo     Respiratory comment: 15L    EKG, 12 LEAD, SUBSEQUENT    Collection Time: 02/06/22  7:24 AM   Result Value Ref Range    Ventricular Rate 112 BPM    Atrial Rate 112 BPM    P-R Interval 150 ms    QRS Duration 82 ms    Q-T Interval 354 ms    QTC Calculation (Bezet) 483 ms    Calculated P Axis 85 degrees    Calculated R Axis 60 degrees    Calculated T Axis -134 degrees    Diagnosis       Sinus tachycardia  Marked ST abnormality, possible inferior subendocardial injury  Abnormal ECG    Confirmed by Ana M Mendoza ([id number]) on 2/6/2022 8:25:13 AM     ECHO ADULT COMPLETE    Collection Time: 02/06/22  9:02 AM   Result Value Ref Range    LV EDV A2C 66 mL    LV EDV A4C 66 mL    LV EDV BP 66 56 - 104 mL    LV ESV A2C 33 mL    LV ESV A4C 30 mL    IVSd 0.8 0.6 - 0.9 cm    LVIDd 4.3 3.9 - 5.3 cm    LVIDs 3.0 cm    LVOT Diameter 1.9 cm    LVOT Mean Gradient 1 mmHg    LVOT VTI 16.2 cm    LVOT Peak Velocity 0.9 m/s    LVOT Peak Gradient 3 mmHg    LVPWd 1.1 (A) 0.6 - 0.9 cm    LV E' Lateral Velocity 7 cm/s    LV E' Septal Velocity 6 cm/s    LV Ejection Fraction A2C 51 %    LV Ejection Fraction A4C 55 %    EF BP 52 (A) 55 - 100 %    LVOT Area 2.8 cm2    LVOT SV 45.9 ml    LA Minor Axis 5.1 cm    LA Major [place] 4.3 cm    LA Area 2C 15.6 cm2    LA Area 4C 13.0 cm2    LA Volume BP 37 22 - 52 mL    LA Diameter 2.5 cm    AR .0 ms    AR Max Velocity PISA 3.7 m/s    AV Peak Velocity 1.2 m/s    AV Peak Gradient 6 mmHg    AV Mean Velocity 0.8 m/s    AV Mean Gradient 3 mmHg    AV VTI 21.5 cm    AV Area by VTI 2.1 cm2    AV Area by Peak Velocity 2.0 cm2    Aortic Root 3.0 cm    Ascending Aorta 3.0 cm    MV Nyquist Velocity 37 cm/s    MR Radius PISA 0.40 cm    MR .0 cm    MR Peak Velocity 5.3 m/s    MR Peak Gradient 112 mmHg    MV E Wave Deceleration Time 114.0 ms    MV A Velocity 0.52 m/s    MV E Velocity 1.07 m/s    MV EROA PISA 7.0 cm2    RVIDd 2.0 cm    TAPSE 1.8 1.5 - 2.0 cm    TR Max Velocity 2.38 m/s    TR Peak Gradient 23 mmHg    Fractional Shortening 2D 30 28 - 44 %    LV EDV Index BP 36 mL/m2    LV ESV Index A4C 17 mL/m2    LV EDV Index A4C 36 mL/m2    LV ESV Index A2C 18 mL/m2    LV EDV Index A2C 36 mL/m2    LVIDd Index 2.38 cm/m2    LVIDs Index 1.66 cm/m2    LV RWT Ratio 0.51     LV Mass 2D 132.7 67 - 162 g    LV Mass 2D Index 73.3 43 - 95 g/m2    MV E/A 2.06     E/E' Ratio (Averaged) 16.56     E/E' Lateral 15.29     E/E' Septal 17.83     LA Volume Index BP 20 16 - 34 ml/m2    LVOT Stroke Volume Index 25.4 mL/m2    LA Size Index 1.38 cm/m2    LA/AO Root Ratio 0.83     Ao Root Index 1.66 cm/m2    Ascending Aorta Index 1.66 cm/m2    AV Velocity Ratio 0.75     LVOT:AV VTI Index 0.75     CHITO/BSA VTI 1.2 cm2/m2    CHITO/BSA Peak Velocity 1.1 cm2/m2    MR Regurg Volume PISA 1,129.36 mL    Est. RA Pressure 3 mmHg    RVSP 26 mmHg    LA Volume 2C 38 22 - 52 mL    LA Volume Index 2C 21 16 - 34 mL/m2    LA Volume 4C 31 22 - 52 mL    LA Volume Index 4C 17 16 - 34 mL/m2       All Micro Results     Procedure Component Value Units Date/Time    MSSA/MRSA SC BY PCR, NASAL SWAB [[id number]] Collected: 02/04/22 0315    Order Status: Completed Specimen: Nasal swab Updated: 02/04/22 9993     Special Requests: NO SPECIAL REQUESTS        Culture result:       SA target not detected. A MRSA NEGATIVE, SA NEGATIVE test result does not preclude MRSA or SA nasal colonization. COVID-19 RAPID TEST [[id number]] Collected: 02/03/22 2343    Order Status: Completed Specimen: Nasopharyngeal Updated: 02/04/22 0037     Specimen source NASAL        COVID-19 rapid test Not detected        Comment:      The specimen is NEGATIVE for SARS-CoV-2, the novel coronavirus associated with COVID-19. A negative result does not rule out COVID-19. This test has been authorized by the FDA under an Emergency Use Authorization (EUA) for use by authorized laboratories.         Fact sheet for Healthcare Providers: ConventionUpdate.co.nz  Fact sheet for Patients: ConventionUpdate.co.nz       Methodology: Isothermal Nucleic Acid Amplification               Other Studies:  XR CHEST SNGL V    Result Date: 2/6/2022   Portable view of the chest COMPARISON: February. CLINICAL HISTORY: Wheezing. FINDINGS: There are bilateral interstitial densities, appearing chronic. There is small nodular density in the left midlung. There is emphysema. No pneumothorax or large pleural effusion. Cardiac mediastinal silhouette is within normal limits. Surrounding bones are intact. 1. Emphysema. No evidence of pneumonia or pulmonary edema. 2. Increased interstitial prominence, appearing chronic. 3. Nodular density in the left midlung, appearing similar to CT of October 8, 2021. Consider follow-up chest CT study.       Current Meds:  Current Facility-Administered Medications   Medication Dose Route Frequency    albuterol-ipratropium (DUO-NEB) 2.5 MG-0.5 MG/3 ML  3 mL Nebulization Q6H PRN    nitroglycerin (NITROSTAT) tablet 0.4 mg  0.4 mg SubLINGual PRN    atorvastatin (LIPITOR) tablet 80 mg  80 mg Oral QHS    aspirin delayed-release tablet 81 mg  81 mg Oral DAILY    budesonide-formoteroL (SYMBICORT) 160-4.5 mcg/actuation HFA inhaler 2 Puff  2 Puff Inhalation BID RT    metoprolol succinate (TOPROL-XL) XL tablet 100 mg  100 mg Oral DAILY    pantoprazole (PROTONIX) tablet 40 mg  40 mg Oral BID    lisinopriL (PRINIVIL, ZESTRIL) tablet 5 mg  5 mg Oral DAILY    tiotropium bromide (SPIRIVA RESPIMAT) 2.5 mcg /actuation  2 Puff Inhalation DAILY    torsemide (DEMADEX) tablet 10 mg  10 mg Oral DAILY    predniSONE (DELTASONE) tablet 10 mg  10 mg Oral DAILY WITH BREAKFAST    alcohol 62% (NOZIN) nasal  1 Ampule  1 Ampule Topical Q12H    sodium chloride (NS) flush 5-40 mL  5-40 mL IntraVENous Q8H    sodium chloride (NS) flush 5-40 mL  5-40 mL IntraVENous PRN    acetaminophen (TYLENOL) tablet 1,000 mg  1,000 mg Oral Q6H    oxyCODONE IR (ROXICODONE) tablet 10 mg  10 mg Oral Q4H PRN    naloxone (NARCAN) injection 0.2-0.4 mg  0.2-0.4 mg IntraVENous Q10MIN PRN    senna-docusate (PERICOLACE) 8.6-50 mg per tablet 2 Tablet  2 Tablet Oral DAILY    ondansetron (ZOFRAN) injection 4 mg  4 mg IntraVENous Q4H PRN       Signed:  Frantz Thomas MD    Part of this note may have been written by using a voice dictation software. The note has been proof read but may still contain some grammatical/other typographical errors.

## 2022-02-06 NOTE — PROGRESS NOTES
TRANSFER - OUT REPORT:    Verbal report given to ALINE Burton(name) on Cristian Caraballo  being transferred to [facility](unit) for routine progression of care       Report consisted of patients Situation, Background, Assessment and   Recommendations(SBAR). Information from the following report(s) SBAR was reviewed with the receiving nurse. Lines:   Peripheral IV 02/03/22 Right Antecubital (Active)   Site Assessment Clean, dry, & intact 02/06/22 0722   Phlebitis Assessment 0 02/06/22 0722   Infiltration Assessment 0 02/06/22 0722   Dressing Status Clean, dry, & intact 02/06/22 0722   Dressing Type Transparent;Tape 02/06/22 0722   Hub Color/Line Status Pink;Capped 02/06/22 0722   Alcohol Cap Used No 02/04/22 1231        Opportunity for questions and clarification was provided.

## 2022-02-06 NOTE — CONSULTS
History and Physical Initial Visit NOTE           2/6/2022    Jami Madrid                        Date of Admission:  2/3/2022    The patient's chart is reviewed and the patient is discussed with the staff. Subjective:     Patient is a 66 y.o.  female seen and evaluated at the request of Dr. Sudhir Sanchez for hypoxia and concerns for PE. Pt just had CT scan completed, which was negative for PE, but does show some irregular spiculated 2.8 cm lesion in LLL. Pt is known to us in the office for very severe COPD with centrilobular emphysema. She was referred to [facility] for possible lung transplant but was denied. She had a navigation bronchoscopy with attempts at biopsy of a CANDELARIO nodule 2/11/20 which was difficult to visualize with radial probe and ultimately was non diagnostic. PET scan showed this area to have an SUV of 2.1. She was to have a bronchoscopy attempt at [facility] in July but repeat imaging performed here showed improvement in this area so the bronchoscopy has been cancelled. Using symbicort, spiriva, daliresp and azithromycin OP. Has completed pulmonary rehab. She wears O2 at night on 1 L. Recently completed steroid taper. She was admitted for R hip fracture and underwent surgery for repair on 2/4/22  This morning had an acute change in her O2 needs requiring high FiO2 on bipap. She was given 40mg lasix IV and sent for CTA chest due to concerns for PE. Pt has now been weaned to 4L NC, but still having increased work of breathing. She is wheezing and coughing, although not productive. Denies fevers. Per nursing, cano removed several days ago and has had issues with retention vs low UOP since. Bladder scans with up to 500ml in bladder requiring in and out caths. Review of Systems  A comprehensive review of systems was negative except for that written in the HPI. Prior to Admission Medications   Prescriptions Last Dose Informant Patient Reported? Taking? OTHER   No No   Sig: Handicap placard   OXYGEN-AIR DELIVERY SYSTEMS   Yes No   Sig: by Does Not Apply route. 1lpm qhs    albuterol (PROVENTIL HFA, VENTOLIN HFA, PROAIR HFA) 90 mcg/actuation inhaler   No No   Sig: Take 2 Puffs by inhalation every four (4) hours as needed for Wheezing or Shortness of Breath. albuterol (PROVENTIL VENTOLIN) 2.5 mg /3 mL (0.083 %) nebu Unknown at Unknown time  No No   Sig: 3 mL by Nebulization route every six (6) hours as needed for Shortness of Breath. ICD-10-J44.9, please bill to Med B   aspirin delayed-release 81 mg tablet 2/3/2022 at Unknown time  Yes Yes   Sig: Take  by mouth daily. atorvastatin (LIPITOR) 80 mg tablet Unknown at Unknown time  No No   Sig: TAKE 1 TABLET BY MOUTH DAILY   azithromycin (ZITHROMAX) 500 mg tab 2022 at Unknown time  No Yes   Sig: TAKE 1 TABLET BY MOUTH EVERY MONDAY, WEDNESDAY, AND FRIDAY   budesonide-formoteroL (Symbicort) 160-4.5 mcg/actuation HFAA 2/3/2022 at Unknown time  No Yes   Sig: Take 2 Puffs by inhalation two (2) times a day. cetirizine (ZYRTEC) 10 mg tablet 2/3/2022 at Unknown time  Yes Yes   Sig: Take  by mouth daily as needed. clopidogreL (PLAVIX) 75 mg tab 2/3/2022 at Unknown time  No Yes   Sig: TAKE 1 TABLET BY MOUTH DAILY   ezetimibe (ZETIA) 10 mg tablet 2022 at Unknown time  No Yes   Sig: Take 1 Tablet by mouth daily. guaiFENesin ER (MUCINEX) 600 mg ER tablet 2022 at Unknown time  Yes Yes   Sig: Take 600 mg by mouth two (2) times daily as needed. metoprolol succinate (TOPROL-XL) 100 mg tablet 2/3/2022 at Unknown time  No Yes   Sig: Take 1 Tablet by mouth daily. multivitamin (ONE A DAY) tablet 2/3/2022 at Unknown time  Yes Yes   Sig: Take 1 Tablet by mouth daily. multivitamin/folic acid/biotin (HAIR-SKIN-NAILS, MV-FA-BIOTIN, PO) 2/3/2022 at Unknown time  Yes Yes   Sig: Take  by mouth.    nitroglycerin (NITROSTAT) 0.4 mg SL tablet 2/3/2022 at Unknown time  No Yes   Si Tab by SubLINGual route every five (5) minutes as needed for Chest Pain. nitroglycerin (NITROSTAT) 0.4 mg SL tablet   No No   Si Tablet by SubLINGual route three (3) times daily as needed for Chest Pain. Up to 3 doses. pantoprazole (PROTONIX) 40 mg tablet 2/3/2022 at Unknown time  No Yes   Sig: TAKE 1 TABLET BY MOUTH TWICE DAILY   predniSONE (DELTASONE) 20 mg tablet   No No   Si tabs po qd x 3 days, 1 and 1/2 tabs po qd x 3 days, 1 tab po qd x 3 days, 1/2 tab po qd x 3 days, or as directed. Patient taking differently: 2 tabs po qd x 3 days, 1 and 1/2 tabs po qd x 3 days, 1 tab po qd x 3 days, 1/2 tab po qd x 3 days, or as directed. Pt is taking ten mg a day   ramipriL (ALTACE) 5 mg capsule   No No   Sig: Take 1 Capsule by mouth daily. roflumilast (Daliresp) 500 mcg tab tablet 2022 at Unknown time  No Yes   Sig: Take 1 Tablet by mouth daily. tiotropium (Spiriva with HandiHaler) 18 mcg inhalation capsule 2/3/2022 at Unknown time  No Yes   Sig: Take 1 Cap by inhalation daily. torsemide (DEMADEX) 10 mg tablet 2/3/2022 at Unknown time  No Yes   Sig: Take 1 Tablet by mouth daily.       Facility-Administered Medications: None     Past Medical History:   Diagnosis Date    Arrhythmia     Arthritis     CAD (coronary artery disease) , 2013    PCI,  Elizabeth Carias     Carotid stenosis, right     endarterectomy 19    Chronic anxiety 2017    COPD     recent referral to [facility] for lung transplant    Emphyse[facility] lung ([facility] [address]     GERD (gastroesophageal reflux disease)     controlled with medication    History of echocardiogram 2019    History of echocardiogram     echo 19 LVEF 55-60%    Hypertension     Nausea & vomiting     Oxygen dependent     1 liter at night    Pleurisy     Thyroid disease     pt denies     Past Surgical History:   Procedure Laterality Date    HX GYN      rebuilt cervix    HX HEART CATHETERIZATION  2019    last stent- per patient- 10 stents total    HX OTHER SURGICAL      HX TONSIL AND ADENOIDECTOMY      VASCULAR SURGERY PROCEDURE UNLIST Right 2019    carotid endarterectomy     Social History     Socioeconomic History    Marital status:      Spouse name: Not on file    Number of children: Not on file    Years of education: Not on file    Highest education level: Not on file   Occupational History    Occupation: [employer]     Employer: SELF EMPLOYED     Comment: home   Tobacco Use    Smoking status: Former Smoker     Packs/day: 0.75     Years: 53.00     Pack years: 39.75     Types: Cigarettes     Quit date:      Years since quittin.1    Smokeless tobacco: Never Used   Substance and Sexual Activity    Alcohol use: Yes     Comment: rarely    Drug use: No    Sexual activity: Not on file   Other Topics Concern   [address] Service Not Asked    Blood Transfusions Not Asked    Caffeine Concern Not Asked    Occupational Exposure Not Asked    Hobby Hazards Not Asked    Sleep Concern Not Asked    Stress Concern Not Asked    Weight Concern Not Asked    Special Diet Not Asked    Back Care Not Asked    Exercise Not Asked    Bike Helmet Not Asked    [address] Not Asked    Self-Exams Not Asked   Social History Narrative    , has 2 children, 1 step child. 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      Social Determinants of Health     Financial Resource Strain:     Difficulty of Paying Living Expenses: Not on file   Food Insecurity:     Worried About [address] in the Last Year: Not on file    [address] in the Last Year: Not on file   Transportation Needs:     Lack of Transportation (Medical): Not on file    Lack of Transportation (Non-Medical):  Not on file   Physical Activity:     Days of Exercise per Week: Not on file    Minutes of Exercise per Session: Not on file   Stress:     Feeling of Stress : Not on file   Social Connections:     Frequency of Communication with Friends and Family: Not on file    Frequency of Social Gatherings with Friends and Family: Not on file    Attends Rastafarian Services: Not on file    Active Member of Clubs or Organizations: Not on file    Attends Club or Organization Meetings: Not on file    Marital Status: Not on file   Intimate Partner Violence:     Fear of Current or Ex-Partner: Not on file    Emotionally Abused: Not on file    Physically Abused: Not on file    Sexually Abused: Not on file   Housing Stability:     Unable to Pay for Housing in the Last Year: Not on file    Number of Places Lived in the Last Year: Not on file    Unstable Housing in the Last Year: Not on file     Family History   Problem Relation Age of Onset    No Known Problems Mother         adopted     Allergies   Allergen Reactions    Promethazine Nausea and Vomiting and Other (comments)     Severe vomiting      Current Facility-Administered Medications   Medication Dose Route Frequency    albuterol-ipratropium (DUO-NEB) 2.5 MG-0.5 MG/3 ML  3 mL Nebulization Q6H PRN    nitroglycerin (NITROSTAT) tablet 0.4 mg  0.4 mg SubLINGual PRN    atorvastatin (LIPITOR) tablet 80 mg  80 mg Oral QHS    aspirin delayed-release tablet 81 mg  81 mg Oral DAILY    budesonide-formoteroL (SYMBICORT) 160-4.5 mcg/actuation HFA inhaler 2 Puff  2 Puff Inhalation BID RT    metoprolol succinate (TOPROL-XL) XL tablet 100 mg  100 mg Oral DAILY    pantoprazole (PROTONIX) tablet 40 mg  40 mg Oral BID    lisinopriL (PRINIVIL, ZESTRIL) tablet 5 mg  5 mg Oral DAILY    tiotropium bromide (SPIRIVA RESPIMAT) 2.5 mcg /actuation  2 Puff Inhalation DAILY    torsemide (DEMADEX) tablet 10 mg  10 mg Oral DAILY    predniSONE (DELTASONE) tablet 10 mg  10 mg Oral DAILY WITH BREAKFAST    alcohol 62% (NOZIN) nasal  1 Ampule  1 Ampule Topical Q12H    sodium chloride (NS) flush 5-40 mL  5-40 mL IntraVENous Q8H    sodium chloride (NS) flush 5-40 mL  5-40 mL IntraVENous PRN    acetaminophen (TYLENOL) tablet 1,000 mg  1,000 mg Oral Q6H    oxyCODONE IR (ROXICODONE) tablet 10 mg  10 mg Oral Q4H PRN    naloxone (NARCAN) injection 0.2-0.4 mg  0.2-0.4 mg IntraVENous Q10MIN PRN    senna-docusate (PERICOLACE) 8.6-50 mg per tablet 2 Tablet  2 Tablet Oral DAILY    ondansetron (ZOFRAN) injection 4 mg  4 mg IntraVENous Q4H PRN     Objective:   Blood pressure 133/68, pulse (!) 112, temperature 97.9 °F (36.6 °C), resp. rate 22, height 5' 8\" (1.727 m), weight 150 lb (68 kg), SpO2 90 %. Intake/Output Summary (Last 24 hours) at 2/6/2022 1254  Last data filed at 2/5/2022 1549  Gross per 24 hour   Intake --   Output 400 ml   Net -400 ml     PHYSICAL EXAM   Constitutional:  the patient is well developed and in no acute distress  EENMT:  Sclera clear, pupils equal, oral mucosa moist  Respiratory: coarse bilaterally on 4L NC; increased work of breathing and use of accessory muscles. Cardiovascular:  RRR without M,G,R  Gastrointestinal: soft and non-tender; with positive bowel sounds. Musculoskeletal: warm without cyanosis. There is 1+ lower extremity edema.   Skin:  no jaundice or rashes, right hip surgical wound  Neurologic: no gross neuro deficits     Psychiatric:  alert and oriented x 4    CT Chest:   2/6/22              Recent Labs     02/06/22  1032 02/06/22  0636 02/06/22  0529 02/06/22  0409 02/05/22  0453 02/05/22  0453 02/04/22  2025 02/04/22  0153 02/03/22  2300 02/03/22  2300   WBC 10.7 12.1*  --   --   --  7.6  --   --    < > 7.2   HGB 8.4* 9.0*  --  9.0*   < > 8.8*   < >  --   --  11.5*   HCT 26.7* 28.3*  --   --   --  28.1*  --   --    < > 35.8    214  --   --   --  172  --   --    < > 221   NA  --  141  --  142  --  139  --   --    < > 140   K  --  3.7  --  3.6  --  3.2*  --   --    < > 3.6   CL  --  109*  --  108*  --  106  --   --    < > 105   CO2  --  26  --  24  --  28  --   --    < > 28   GLU  --  195*  --  157*  --  109*  --   --    < > 97   BUN  --  10  --  9  --  11  --   --    < > 15   CREA  --  0.80  --  0.70  --  0.80 --   --    < > 0.90   CA  --  9.0  --  8.8  --  8.6  --   --    < > 8.9   ALB  --   --   --  2.8*  --  2.6*  --   --   --  3.2   AST  --   --   --  27  --  18  --   --   --  29   ALT  --   --   --  17  --  18  --   --   --  26   AP  --   --   --  66  --  60  --   --   --  79   TROPHS 7,394.0*  --  676.4*  --   --   --   --  42.3*   < > 22.2*    < > = values in this interval not displayed. ECHO: Results from [place] encounter on 02/03/22    ECHO ADULT COMPLETE    Interpretation Summary    Left Ventricle: Left ventricle size is normal. Normal wall thickness. Normal wall motion. Low normal left ventricular systolic function. EF by 2D Simpsons Biplane is 52%. Normal diastolic function.   Aortic Valve: Mildly thickened cusps.   Mitral Valve: Mildly thickened leaflets. Mildly calcified anterior leaflet. Moderate to severe transvalvular regurgitation with an eccentrically directed jet and may underestimate severity. Transesophageal echo  recommended for further evaluation of potentially severe mitral regurgitation.   Aorta: Dilated annulus. Results     Procedure Component Value Units Date/Time    MSSA/MRSA SC BY PCR, NASAL SWAB [[id number]] Collected: 02/04/22 0315    Order Status: Completed Specimen: Nasal swab Updated: 02/04/22 3307     Special Requests: NO SPECIAL REQUESTS        Culture result:       SA target not detected. A MRSA NEGATIVE, SA NEGATIVE test result does not preclude MRSA or SA nasal colonization. COVID-19 RAPID TEST [[id number]] Collected: 02/03/22 2343    Order Status: Completed Specimen: Nasopharyngeal Updated: 02/04/22 0037     Specimen source NASAL        COVID-19 rapid test Not detected        Comment:      The specimen is NEGATIVE for SARS-CoV-2, the novel coronavirus associated with COVID-19. A negative result does not rule out COVID-19.        This test has been authorized by the FDA under an Emergency Use Authorization (EUA) for use by authorized laboratories. Fact sheet for Healthcare Providers: ConventionUpdate.co.nz  Fact sheet for Patients: ConventionUpdate.co.nz       Methodology: Isothermal Nucleic Acid Amplification             Inpat Anti-Infectives (From admission, onward)    None        Assessment and Plan:  (Medical Decision Making)   Principal Problem:    Other fracture of right femur, [facility] (2/4/2022)   --per ortho    Acute Respiratory failure with hypoxia  --on 4L NC with increased work of breathing.  --ct shows newer nodules, but this doesn't account for the acute change in her breathing. Does show increased interstitial prominence which could be edema post surgery and bc she had IVF infusing. Could likely tolerate another dose of lasix. Add BNP to labs  --could also be infectious concerns in RLL, so add procal and lactic to blood work and add sputum and blood cultures; Negative WBC and afebrile. --spiculated lesion can be evaluated further once acute respiratory issues resolved, but last CT in Sept 2021 without lesion in this area, so likely not cancerous given significant growth in short period. --now with elevated troponins and cardiology following. EKG with ST depression. Repeat echo shows EF 52%, but mod to severe transvalvular regurg on mitral valve. Active Problems:    COPD, very severe ([facility] (8/20/2013)   --Increased work of breathing currently. On 4 L NC; can use bipap if needed for work of breathing.  --with wheezing, will change PO prednisone to solumedrol 40 mg IV q8.    --change albuterol to q6 hours       Hyperlipidemia (10/10/2016)    Per hospitalist/cardiology      Essential hypertension, benign (10/10/2016)    --had episode of hypotension this am and torsemide was held.       CAD S/P percutaneous coronary angioplasty (5/31/2019)       Full Code    More than 50% of the time documented was spent in face-to-face contact with the patient and in the care of the patient on the floor/unit where the patient is located. Thank you very much for this referral.  We appreciate the opportunity to participate in this patient's care. Will follow along with above stated plan. Rylan Angel NP       I have spoken with and examined the patient. I agree with the above assessment and plan as documented. Gen: NAD, sitting up in bed  Lungs:  Decreased but clear  Heart:  RRR with no Murmur/Rubs/Gallops  Abd: soft, nt, nd, nabs  Ext: no le edema    Patient with known very severe COPD evaluated and turned down from 2 transplant centers. Also with history of CAD s/p stenting in 2013. This morning developed chest pain. She states she has had bouts of this type of pain in the past and that nitro resolves it. However this morning did not get nitro quickly and it progressed. Does not sound like she had any wheezing at the time. Is being seen by cardiology but I think it is possible this was cardiac related rather than pulmonary driven. Her pulmonary status otherwise seems at baseline. TTE showed moderate to severe mitral regurgitation. Would not be opposed to cath if it would be considered. CT without PE. She has a history of poorly defined lung nodules that were previously evaluated by navigation bronchoscopy which did not show signs of cancer and have been followed radiographically with stability or reduction in size. Now she has a new, similar area in the lower left lung. Last scan was just 4 months ago so suspicion for malignancy here is low. Will add levaquin to cover possible PNA and already getting steroids and would repeat a CT scan in 6-8 weeks. Will change inhalers to nebulized forms for ease of use. Pulmonary will continue to follow.      Sadie Byers MD

## 2022-02-06 NOTE — PROGRESS NOTES
[facility] CRITICAL CARE OUTREACH NURSE PROGRESS REPORT      SUBJECTIVE: Called to assess patient secondary to respiratory distress as well as complaints of chest discomfort. MEWS Score: 2 (02/06/22 0331)  Vitals:    02/06/22 0707 02/06/22 0829 02/06/22 0901 02/06/22 0946   BP:  (!) 110/56 (!) 110/56    Pulse:  (!) 102     Resp:  19     Temp:       SpO2: 100% 100%  95%   Weight:   68 kg (150 lb)    Height:   5' 8\" (1.727 m)       EKG: Sinus tach with ST depression. LAB DATA:    Recent Labs     02/06/22  0636 02/06/22 0409 02/05/22 0453 02/03/22 2300 02/03/22 2300    142 139   < > 140   K 3.7 3.6 3.2*   < > 3.6   * 108* 106   < > 105   CO2 26 24 28   < > 28   AGAP 6* 10 5*   < > 7   * 157* 109*   < > 97   BUN 10 9 11   < > 15   CREA 0.80 0.70 0.80   < > 0.90   GFRAA >60 >60 >60   < > >60   GFRNA >60 >60 >60   < > >60   CA 9.0 8.8 8.6   < > 8.9   ALB  --  2.8* 2.6*  --  3.2   TP  --  6.5 5.9*  --  6.6   GLOB  --  3.7* 3.3  --  3.4   AGRAT  --  0.8* 0.8*  --  0.9*   ALT  --  17 18  --  26    < > = values in this interval not displayed. Recent Labs     02/06/22 0636 02/06/22 0409 02/05/22 0453 02/04/22 2025 02/03/22 2300   WBC 12.1*  --  7.6  --  7.2   HGB 9.0* 9.0* 8.8*   < > 11.5*   HCT 28.3*  --  28.1*  --  35.8     --  172  --  221    < > = values in this interval not displayed. OBJECTIVE: On arrival to room, I found patient to be resting on NRB with O2 sats showing 96. HR sinus tach in 130s on lifepack confirmed on 12 lead. MAP stable at 75-80. Pain Assessment  Pain Intensity 1: 0 (02/05/22 2120)  Pain Location 1: Chest  Pain Intervention(s) 1: Medication (see MAR)  Patient Stated Pain Goal: 0                                 ASSESSMENT:  Bilateral lung sounds coarse. Patient responds to voice and states she does not feel well. PLAN:  Per Cardiology NP 10mg IV cardizem push now, and 40 of IV lasix.  Patient to be placed on BIPAP for increased work of breathing for 2 hours. Primary RN to call with any concerns. HR down to 110s following IV cardizem. PT states she feels much better following cardizem and BIPAP.

## 2022-02-06 NOTE — PROGRESS NOTES
Torrie [facility] MIDLINE Placement Note    PRE-PROCEDURE VERIFICATION  PROCEDURE DETAIL  Time out completed with Fatmata Garcia RN and everyone in agreement with procedure. A single lumen Midline was attempted in the left basilic for vascular access but was unsuccessful so 20g 8cm EDC lot [id number] was placed in the left brachial vein.  The following documentation is in addition to the Midline properties in the lines/airways flowsheet :  Lot #: [id number]  Xylocaine used: yes  Mid-Arm Circumference: 30.5 (cm)

## 2022-02-06 NOTE — PROGRESS NOTES
Problem: Falls - Risk of  Goal: *Absence of Falls  Description: Document Lettie Duverney Fall Risk and appropriate interventions in the flowsheet. Outcome: Progressing Towards Goal  Note: Fall Risk Interventions:  Mobility Interventions: Bed/chair exit alarm,Utilize walker, cane, or other assistive device,Patient to call before getting OOB         Medication Interventions: Bed/chair exit alarm,Patient to call before getting OOB,Teach patient to arise slowly    Elimination Interventions: Bed/chair exit alarm,Call light in reach,Patient to call for help with toileting needs    History of Falls Interventions: Bed/chair exit alarm         Problem: Patient Education: Go to Patient Education Activity  Goal: Patient/Family Education  Outcome: Progressing Towards Goal     Problem: Pressure Injury - Risk of  Goal: *Prevention of pressure injury  Description: Document Bobby Scale and appropriate interventions in the flowsheet.   Outcome: Progressing Towards Goal  Note: Pressure Injury Interventions:  Sensory Interventions: Assess changes in LOC    Moisture Interventions: Absorbent underpads,Limit adult briefs,Maintain skin hydration (lotion/cream)    Activity Interventions: PT/OT evaluation,Increase time out of bed,Pressure redistribution bed/mattress(bed type)    Mobility Interventions: PT/OT evaluation,HOB 30 degrees or less,Pressure redistribution bed/mattress (bed type)    Nutrition Interventions: Offer support with meals,snacks and hydration,Document food/fluid/supplement intake    Friction and Shear Interventions: HOB 30 degrees or less,Minimize layers                Problem: Patient Education: Go to Patient Education Activity  Goal: Patient/Family Education  Outcome: Progressing Towards Goal     Problem: Patient Education: Go to Patient Education Activity  Goal: Patient/Family Education  Outcome: Progressing Towards Goal     Problem: Patient Education: Go to Patient Education Activity  Goal: Patient/Family Education  Outcome: Progressing Towards Goal

## 2022-02-06 NOTE — PROGRESS NOTES
ORTHO PROGRESS NOTE    2022  Admit Date:   2/3/2022    Post Op day: 2 Days Post-Op    Subjective: Siri Porter status post a right hip gamma nail on 2022. Resting quietly in bed. Denies any significant pain. There was some issues with shortness of breath and being placed on BiPAP along with tachycardia. Looks as though cardiology has been consulted. Getting a echo and work-up for PE today.     PT/OT:   Gait:   Progressing                 Vital Signs:    Patient Vitals for the past 8 hrs:   BP Temp Pulse Resp SpO2 Height Weight   22 0946 -- -- -- -- 95 % -- --   22 0901 (!) 110/56 -- -- -- -- 5' 8\" (1.727 m) 150 lb (68 kg)   22 0829 (!) 110/56 -- (!) 102 19 100 % -- --   22 0707 -- -- -- -- 100 % -- --   22 0659 (!) 120/58 -- (!) 110 -- -- -- --   22 0656 (!) 105/55 -- (!) 108 -- -- -- --   22 0652 118/70 -- (!) 125 -- -- -- --   22 0614 -- -- -- -- 100 % -- --   22 0553 -- -- -- -- 100 % -- --   22 0456 (!) 118/99 -- (!) 146 -- -- -- --   22 0331 134/81 98.3 °F (36.8 °C) (!) 107 18 96 % -- --     Temp (24hrs), Av.8 °F (36.6 °C), Min:97.4 °F (36.3 °C), Max:98.3 °F (36.8 °C)      Pain Control:   Pain Assessment  Pain Scale 1: Numeric (0 - 10)  Pain Intensity 1: 0  Pain Onset 1: acute  Pain Location 1: Chest  Pain Orientation 1: Mid  Pain Description 1: Sharp  Pain Intervention(s) 1: Medication (see MAR)    Meds:    Current Facility-Administered Medications   Medication Dose Route Frequency    albuterol-ipratropium (DUO-NEB) 2.5 MG-0.5 MG/3 ML  3 mL Nebulization Q6H PRN    nitroglycerin (NITROSTAT) tablet 0.4 mg  0.4 mg SubLINGual PRN    atorvastatin (LIPITOR) tablet 80 mg  80 mg Oral QHS    aspirin delayed-release tablet 81 mg  81 mg Oral DAILY    budesonide-formoteroL (SYMBICORT) 160-4.5 mcg/actuation HFA inhaler 2 Puff  2 Puff Inhalation BID RT    metoprolol succinate (TOPROL-XL) XL tablet 100 mg  100 mg Oral DAILY    pantoprazole (PROTONIX) tablet 40 mg  40 mg Oral BID    lisinopriL (PRINIVIL, ZESTRIL) tablet 5 mg  5 mg Oral DAILY    tiotropium bromide (SPIRIVA RESPIMAT) 2.5 mcg /actuation  2 Puff Inhalation DAILY    torsemide (DEMADEX) tablet 10 mg  10 mg Oral DAILY    predniSONE (DELTASONE) tablet 10 mg  10 mg Oral DAILY WITH BREAKFAST    alcohol 62% (NOZIN) nasal  1 Ampule  1 Ampule Topical Q12H    sodium chloride (NS) flush 5-40 mL  5-40 mL IntraVENous Q8H    sodium chloride (NS) flush 5-40 mL  5-40 mL IntraVENous PRN    acetaminophen (TYLENOL) tablet 1,000 mg  1,000 mg Oral Q6H    oxyCODONE IR (ROXICODONE) tablet 10 mg  10 mg Oral Q4H PRN    naloxone (NARCAN) injection 0.2-0.4 mg  0.2-0.4 mg IntraVENous Q10MIN PRN    senna-docusate (PERICOLACE) 8.6-50 mg per tablet 2 Tablet  2 Tablet Oral DAILY    ondansetron (ZOFRAN) injection 4 mg  4 mg IntraVENous Q4H PRN       LAB:    Recent Labs     02/06/22  0636   HCT 28.3*   HGB 9.0*       24 Hour Assessment Issues:    Oriented    Discharge Planning: SNF    Transfuse PRBC's:      Assessment & Physician's Comment:  Dressing is clean, dry, and intact  Neurovascular checks within normal limits  Orientation:  Oriented    Principal Problem:    Other fracture of right femur, initial encounter for closed fracture ([facility] [address] (2/4/2022)    Active Problems:    COPD, very severe ([address] (8/20/2013)      Hyperlipidemia (10/10/2016)      Essential hypertension, benign (10/10/2016)      CAD S/P percutaneous coronary angioplasty (5/31/2019)      Closed right hip fracture ([address] (2/4/2022)        Plan:  Continue PT/OT.     Medical issues per hospitalist and cardiology    Harrison Dillon NP

## 2022-02-06 NOTE — PROGRESS NOTES
Date of Outreach Update:  Gabbi Fajardo was seen and assessed. MEWS Score: 3 (02/06/22 1059)  Vitals:    02/06/22 1059 02/06/22 1249 02/06/22 1339 02/06/22 1441   BP: (!) 90/59 133/68 (!) 123/100    Pulse: 96 (!) 112 (!) 130    Resp: 22      Temp: 97.9 °F (36.6 °C)      SpO2: 100% 90%  100%   Weight:       Height:             Pain Assessment  Pain Intensity 1: 0 (02/06/22 0722)  Pain Location 1: Chest  Pain Intervention(s) 1: Medication (see MAR)  Patient Stated Pain Goal: 0      Previous Outreach assessment has been reviewed. There have been no significant clinical changes since the completion of the last dated Outreach assessment.  Patients HR     Signed ByNayely Villalta Standard    February 6, 2022 3:54 PM

## 2022-02-06 NOTE — PROGRESS NOTES
1. Patient with elevated troponin respiratory event this am. Echo with possible severe MR. Heparin infusion. 2. Move to 3rd floor telemetry.

## 2022-02-06 NOTE — PROGRESS NOTES
I got perfect served by RN that patient is complaining of chest pain. EKG obtained showing ST depressions on multiple leads. Possible ST elevations on aVR and V1. Nitroglycerin as needed given. Aspirin 325 mg p.o. given. Troponin pending. Called STEMI MD but no answer. Spoke to cardiology NP Tani Fearing. She will go see the patient. Will consider heparin gtt pending troponin and stat evaluation by cardiology.

## 2022-02-07 LAB
25(OH)D3 SERPL-MCNC: 30.1 NG/ML (ref 30–100)
ACT BLD: 267 SECS (ref 70–128)
ACT BLD: 279 SECS (ref 70–128)
ALBUMIN SERPL-MCNC: 2.5 G/DL (ref 3.2–4.6)
ALBUMIN/GLOB SERPL: 0.7 {RATIO} (ref 1.2–3.5)
ALP SERPL-CCNC: 64 U/L (ref 50–136)
ALT SERPL-CCNC: 29 U/L (ref 12–65)
ANION GAP SERPL CALC-SCNC: 4 MMOL/L (ref 7–16)
AST SERPL-CCNC: 147 U/L (ref 15–37)
BASOPHILS # BLD: 0 K/UL (ref 0–0.2)
BASOPHILS NFR BLD: 0 % (ref 0–2)
BILIRUB SERPL-MCNC: 0.3 MG/DL (ref 0.2–1.1)
BUN SERPL-MCNC: 11 MG/DL (ref 8–23)
CALCIUM SERPL-MCNC: 9.1 MG/DL (ref 8.3–10.4)
CHLORIDE SERPL-SCNC: 106 MMOL/L (ref 98–107)
CO2 SERPL-SCNC: 29 MMOL/L (ref 21–32)
CREAT SERPL-MCNC: 0.7 MG/DL (ref 0.6–1)
DIFFERENTIAL METHOD BLD: ABNORMAL
EOSINOPHIL # BLD: 0.3 K/UL (ref 0–0.8)
EOSINOPHIL NFR BLD: 3 % (ref 0.5–7.8)
ERYTHROCYTE [DISTWIDTH] IN BLOOD BY AUTOMATED COUNT: 14.3 % (ref 11.9–14.6)
GLOBULIN SER CALC-MCNC: 3.7 G/DL (ref 2.3–3.5)
GLUCOSE SERPL-MCNC: 185 MG/DL (ref 65–100)
HCT VFR BLD AUTO: 23.7 % (ref 35.8–46.3)
HGB BLD-MCNC: 7.6 G/DL (ref 11.7–15.4)
IMM GRANULOCYTES # BLD AUTO: 0 K/UL (ref 0–0.5)
IMM GRANULOCYTES NFR BLD AUTO: 0 % (ref 0–5)
LYMPHOCYTES # BLD: 0.3 K/UL (ref 0.5–4.6)
LYMPHOCYTES NFR BLD: 3 % (ref 13–44)
MCH RBC QN AUTO: 29.2 PG (ref 26.1–32.9)
MCHC RBC AUTO-ENTMCNC: 32.1 G/DL (ref 31.4–35)
MCV RBC AUTO: 91.2 FL (ref 79.6–97.8)
MM INDURATION POC: 0 MM (ref 0–5)
MONOCYTES # BLD: 0.4 K/UL (ref 0.1–1.3)
MONOCYTES NFR BLD: 4 % (ref 4–12)
NEUTS SEG # BLD: 7.9 K/UL (ref 1.7–8.2)
NEUTS SEG NFR BLD: 90 % (ref 43–78)
NRBC # BLD: 0 K/UL (ref 0–0.2)
PLATELET # BLD AUTO: 201 K/UL (ref 150–450)
PMV BLD AUTO: 10.4 FL (ref 9.4–12.3)
POTASSIUM SERPL-SCNC: 3.9 MMOL/L (ref 3.5–5.1)
PPD POC: NEGATIVE NEGATIVE
PROT SERPL-MCNC: 6.2 G/DL (ref 6.3–8.2)
RBC # BLD AUTO: 2.6 M/UL (ref 4.05–5.2)
SODIUM SERPL-SCNC: 139 MMOL/L (ref 136–145)
T4 FREE SERPL-MCNC: 2.4 NG/DL (ref 0.78–1.46)
TSH SERPL DL<=0.005 MIU/L-ACNC: 0.1 UIU/ML (ref 0.36–3.74)
UFH PPP CHRO-ACNC: 0.51 IU/ML (ref 0.3–0.7)
WBC # BLD AUTO: 8.9 K/UL (ref 4.3–11.1)

## 2022-02-07 PROCEDURE — C1887 CATHETER, GUIDING: HCPCS | Performed by: INTERNAL MEDICINE

## 2022-02-07 PROCEDURE — 74011250636 HC RX REV CODE- 250/636: Performed by: NURSE PRACTITIONER

## 2022-02-07 PROCEDURE — 74011000250 HC RX REV CODE- 250: Performed by: INTERNAL MEDICINE

## 2022-02-07 PROCEDURE — 93458 L HRT ARTERY/VENTRICLE ANGIO: CPT | Performed by: INTERNAL MEDICINE

## 2022-02-07 PROCEDURE — 92928 PRQ TCAT PLMT NTRAC ST 1 LES: CPT | Performed by: INTERNAL MEDICINE

## 2022-02-07 PROCEDURE — 74011250636 HC RX REV CODE- 250/636: Performed by: INTERNAL MEDICINE

## 2022-02-07 PROCEDURE — 74011250637 HC RX REV CODE- 250/637: Performed by: INTERNAL MEDICINE

## 2022-02-07 PROCEDURE — 84443 ASSAY THYROID STIM HORMONE: CPT

## 2022-02-07 PROCEDURE — 92920 PRQ TRLUML C ANGIOP 1ART&/BR: CPT | Performed by: INTERNAL MEDICINE

## 2022-02-07 PROCEDURE — 74011250637 HC RX REV CODE- 250/637: Performed by: NURSE PRACTITIONER

## 2022-02-07 PROCEDURE — C1894 INTRO/SHEATH, NON-LASER: HCPCS | Performed by: INTERNAL MEDICINE

## 2022-02-07 PROCEDURE — C1769 GUIDE WIRE: HCPCS | Performed by: INTERNAL MEDICINE

## 2022-02-07 PROCEDURE — C1725 CATH, TRANSLUMIN NON-LASER: HCPCS | Performed by: INTERNAL MEDICINE

## 2022-02-07 PROCEDURE — 74011250637 HC RX REV CODE- 250/637: Performed by: ORTHOPAEDIC SURGERY

## 2022-02-07 PROCEDURE — 99152 MOD SED SAME PHYS/QHP 5/>YRS: CPT | Performed by: INTERNAL MEDICINE

## 2022-02-07 PROCEDURE — C1874 STENT, COATED/COV W/DEL SYS: HCPCS | Performed by: INTERNAL MEDICINE

## 2022-02-07 PROCEDURE — 92921 HC PTCA SNGL MAJOR COR VESL/BRNCH EA ADD RC: CPT | Performed by: INTERNAL MEDICINE

## 2022-02-07 PROCEDURE — 94640 AIRWAY INHALATION TREATMENT: CPT

## 2022-02-07 PROCEDURE — 77030042317 HC BND COMPR HEMSTAT -B: Performed by: INTERNAL MEDICINE

## 2022-02-07 PROCEDURE — 94760 N-INVAS EAR/PLS OXIMETRY 1: CPT

## 2022-02-07 PROCEDURE — 74011000250 HC RX REV CODE- 250: Performed by: ORTHOPAEDIC SURGERY

## 2022-02-07 PROCEDURE — 74011250637 HC RX REV CODE- 250/637: Performed by: HOSPITALIST

## 2022-02-07 PROCEDURE — 82306 VITAMIN D 25 HYDROXY: CPT

## 2022-02-07 PROCEDURE — 77010033678 HC OXYGEN DAILY

## 2022-02-07 PROCEDURE — 99153 MOD SED SAME PHYS/QHP EA: CPT | Performed by: INTERNAL MEDICINE

## 2022-02-07 PROCEDURE — 80053 COMPREHEN METABOLIC PANEL: CPT

## 2022-02-07 PROCEDURE — 77030016699 HC CATH ANGI DX INFN1 CARD -A: Performed by: INTERNAL MEDICINE

## 2022-02-07 PROCEDURE — 85520 HEPARIN ASSAY: CPT

## 2022-02-07 PROCEDURE — 65660000000 HC RM CCU STEPDOWN

## 2022-02-07 PROCEDURE — 83735 ASSAY OF MAGNESIUM: CPT

## 2022-02-07 PROCEDURE — 99232 SBSQ HOSP IP/OBS MODERATE 35: CPT | Performed by: INTERNAL MEDICINE

## 2022-02-07 PROCEDURE — 92941 PRQ TRLML REVSC TOT OCCL AMI: CPT | Performed by: INTERNAL MEDICINE

## 2022-02-07 PROCEDURE — 77030012468 HC VLV BLEEDBK CNTRL ABBT -B: Performed by: INTERNAL MEDICINE

## 2022-02-07 PROCEDURE — 74011000636 HC RX REV CODE- 636: Performed by: INTERNAL MEDICINE

## 2022-02-07 PROCEDURE — 85025 COMPLETE CBC W/AUTO DIFF WBC: CPT

## 2022-02-07 PROCEDURE — 85347 COAGULATION TIME ACTIVATED: CPT

## 2022-02-07 PROCEDURE — 77030011222 HC DEV INFL PTCA BSC -B: Performed by: INTERNAL MEDICINE

## 2022-02-07 PROCEDURE — 84439 ASSAY OF FREE THYROXINE: CPT

## 2022-02-07 DEVICE — EVEROLIMUS-ELUTING PLATINUM CHROMIUM CORONARY STENT SYSTEM
Type: IMPLANTABLE DEVICE | Site: HEART | Status: FUNCTIONAL
Brand: SYNERGY™ XD

## 2022-02-07 RX ORDER — MIDAZOLAM HYDROCHLORIDE 1 MG/ML
INJECTION, SOLUTION INTRAMUSCULAR; INTRAVENOUS AS NEEDED
Status: DISCONTINUED | OUTPATIENT
Start: 2022-02-07 | End: 2022-02-07 | Stop reason: HOSPADM

## 2022-02-07 RX ORDER — PRASUGREL 10 MG/1
60 TABLET, FILM COATED ORAL ONCE
Status: DISCONTINUED | OUTPATIENT
Start: 2022-02-07 | End: 2022-02-07

## 2022-02-07 RX ORDER — MORPHINE SULFATE 4 MG/ML
2 INJECTION INTRAVENOUS
Status: DISCONTINUED | OUTPATIENT
Start: 2022-02-07 | End: 2022-02-10 | Stop reason: HOSPADM

## 2022-02-07 RX ORDER — LIDOCAINE 4 G/100G
1 PATCH TOPICAL EVERY 24 HOURS
Status: DISCONTINUED | OUTPATIENT
Start: 2022-02-07 | End: 2022-02-10 | Stop reason: HOSPADM

## 2022-02-07 RX ORDER — ALBUTEROL SULFATE 0.83 MG/ML
2.5 SOLUTION RESPIRATORY (INHALATION)
Status: DISCONTINUED | OUTPATIENT
Start: 2022-02-07 | End: 2022-02-10

## 2022-02-07 RX ORDER — HEPARIN SODIUM 10000 [USP'U]/ML
INJECTION, SOLUTION INTRAVENOUS; SUBCUTANEOUS AS NEEDED
Status: DISCONTINUED | OUTPATIENT
Start: 2022-02-07 | End: 2022-02-07 | Stop reason: HOSPADM

## 2022-02-07 RX ORDER — METOPROLOL TARTRATE 50 MG/1
50 TABLET ORAL EVERY 6 HOURS
Status: DISCONTINUED | OUTPATIENT
Start: 2022-02-07 | End: 2022-02-10 | Stop reason: HOSPADM

## 2022-02-07 RX ORDER — PRASUGREL 10 MG/1
TABLET, FILM COATED ORAL AS NEEDED
Status: DISCONTINUED | OUTPATIENT
Start: 2022-02-07 | End: 2022-02-07 | Stop reason: HOSPADM

## 2022-02-07 RX ORDER — ACETAMINOPHEN 325 MG/1
650 TABLET ORAL EVERY 8 HOURS
Status: DISCONTINUED | OUTPATIENT
Start: 2022-02-07 | End: 2022-02-10 | Stop reason: HOSPADM

## 2022-02-07 RX ORDER — ALBUTEROL SULFATE 0.83 MG/ML
2.5 SOLUTION RESPIRATORY (INHALATION)
Status: DISCONTINUED | OUTPATIENT
Start: 2022-02-07 | End: 2022-02-10 | Stop reason: HOSPADM

## 2022-02-07 RX ORDER — HYDROMORPHONE HYDROCHLORIDE 2 MG/ML
INJECTION, SOLUTION INTRAMUSCULAR; INTRAVENOUS; SUBCUTANEOUS AS NEEDED
Status: DISCONTINUED | OUTPATIENT
Start: 2022-02-07 | End: 2022-02-07 | Stop reason: HOSPADM

## 2022-02-07 RX ORDER — METOPROLOL TARTRATE 5 MG/5ML
INJECTION INTRAVENOUS AS NEEDED
Status: DISCONTINUED | OUTPATIENT
Start: 2022-02-07 | End: 2022-02-07 | Stop reason: HOSPADM

## 2022-02-07 RX ORDER — LIDOCAINE HYDROCHLORIDE 10 MG/ML
INJECTION INFILTRATION; PERINEURAL AS NEEDED
Status: DISCONTINUED | OUTPATIENT
Start: 2022-02-07 | End: 2022-02-07 | Stop reason: HOSPADM

## 2022-02-07 RX ORDER — PRASUGREL 10 MG/1
10 TABLET, FILM COATED ORAL DAILY
Status: DISCONTINUED | OUTPATIENT
Start: 2022-02-08 | End: 2022-02-10 | Stop reason: HOSPADM

## 2022-02-07 RX ADMIN — NITROGLYCERIN 0.4 MG: 0.4 TABLET SUBLINGUAL at 07:41

## 2022-02-07 RX ADMIN — NITROGLYCERIN 1 INCH: 20 OINTMENT TOPICAL at 09:02

## 2022-02-07 RX ADMIN — METOPROLOL SUCCINATE 100 MG: 100 TABLET, EXTENDED RELEASE ORAL at 08:29

## 2022-02-07 RX ADMIN — SENNOSIDES AND DOCUSATE SODIUM 2 TABLET: 8.6; 5 TABLET ORAL at 08:29

## 2022-02-07 RX ADMIN — SODIUM CHLORIDE, PRESERVATIVE FREE 10 ML: 5 INJECTION INTRAVENOUS at 21:02

## 2022-02-07 RX ADMIN — METHYLPREDNISOLONE SODIUM SUCCINATE 40 MG: 40 INJECTION, POWDER, FOR SOLUTION INTRAMUSCULAR; INTRAVENOUS at 05:10

## 2022-02-07 RX ADMIN — GUAIFENESIN 1200 MG: 600 TABLET ORAL at 08:29

## 2022-02-07 RX ADMIN — LEVOFLOXACIN 750 MG: 5 INJECTION, SOLUTION INTRAVENOUS at 17:09

## 2022-02-07 RX ADMIN — Medication 1 AMPULE: at 21:03

## 2022-02-07 RX ADMIN — IPRATROPIUM BROMIDE AND ALBUTEROL SULFATE 3 ML: .5; 3 SOLUTION RESPIRATORY (INHALATION) at 13:20

## 2022-02-07 RX ADMIN — PANTOPRAZOLE SODIUM 40 MG: 40 TABLET, DELAYED RELEASE ORAL at 17:09

## 2022-02-07 RX ADMIN — Medication 1 AMPULE: at 08:29

## 2022-02-07 RX ADMIN — ACETAMINOPHEN 1000 MG: 500 TABLET, FILM COATED ORAL at 05:08

## 2022-02-07 RX ADMIN — SODIUM CHLORIDE, PRESERVATIVE FREE 10 ML: 5 INJECTION INTRAVENOUS at 17:11

## 2022-02-07 RX ADMIN — OXYCODONE HYDROCHLORIDE 10 MG: 5 TABLET ORAL at 23:54

## 2022-02-07 RX ADMIN — SODIUM CHLORIDE, PRESERVATIVE FREE 30 ML: 5 INJECTION INTRAVENOUS at 05:10

## 2022-02-07 RX ADMIN — METOPROLOL TARTRATE 50 MG: 50 TABLET, FILM COATED ORAL at 17:09

## 2022-02-07 RX ADMIN — GUAIFENESIN 1200 MG: 600 TABLET ORAL at 21:03

## 2022-02-07 RX ADMIN — ASPIRIN 81 MG: 81 TABLET ORAL at 08:29

## 2022-02-07 RX ADMIN — MORPHINE SULFATE 2 MG: 4 INJECTION INTRAVENOUS at 09:49

## 2022-02-07 RX ADMIN — NITROGLYCERIN 0.4 MG: 0.4 TABLET SUBLINGUAL at 08:28

## 2022-02-07 RX ADMIN — ALBUTEROL SULFATE 2.5 MG: 2.5 SOLUTION RESPIRATORY (INHALATION) at 16:36

## 2022-02-07 RX ADMIN — ATORVASTATIN CALCIUM 80 MG: 80 TABLET, FILM COATED ORAL at 21:03

## 2022-02-07 RX ADMIN — ACETAMINOPHEN 650 MG: 325 TABLET ORAL at 21:03

## 2022-02-07 RX ADMIN — ACETAMINOPHEN 1000 MG: 500 TABLET, FILM COATED ORAL at 00:56

## 2022-02-07 RX ADMIN — ALBUTEROL SULFATE 2.5 MG: 2.5 SOLUTION RESPIRATORY (INHALATION) at 20:16

## 2022-02-07 RX ADMIN — NITROGLYCERIN 0.4 MG: 0.4 TABLET SUBLINGUAL at 08:51

## 2022-02-07 RX ADMIN — LISINOPRIL 5 MG: 5 TABLET ORAL at 08:29

## 2022-02-07 RX ADMIN — FUROSEMIDE 40 MG: 10 INJECTION, SOLUTION INTRAMUSCULAR; INTRAVENOUS at 08:28

## 2022-02-07 RX ADMIN — METHYLPREDNISOLONE SODIUM SUCCINATE 30 MG: 40 INJECTION, POWDER, FOR SOLUTION INTRAMUSCULAR; INTRAVENOUS at 21:03

## 2022-02-07 RX ADMIN — PANTOPRAZOLE SODIUM 40 MG: 40 TABLET, DELAYED RELEASE ORAL at 08:29

## 2022-02-07 RX ADMIN — BUDESONIDE 500 MCG: 0.5 INHALANT RESPIRATORY (INHALATION) at 20:16

## 2022-02-07 NOTE — PROGRESS NOTES
0740    pt complains of cp and requesting sublingual nitro. Nitroglycerin given x3 per order. VSS. Pt currently on 2L nasal cannula. Alison Severance NP notified    0900 Pt continues to complain of chest pain. Orders given for nitrobid BID    0940 Pt still complaining of cp 10/10. Orders received for morphine 2mg. Pt complaining of nausea as well . Stat ekg ordered and cath lab notified.     1005 cath lab to take pt down for procedure

## 2022-02-07 NOTE — PROGRESS NOTES
Hospitalist Progress Note   Admit Date:  2/3/2022 10:44 PM   Name:  Corby Vasquez   Age:  66 y.o. Sex:  female  :  1950   MRN:  [id number]   Room:  325/01    Presenting Complaint: Hip Injury    Reason(s) for Admission: Other fracture of right femur, initial encounter for closed fracture [facility]) [address] Interval History:   Corby Vasquez is a 66 y.o. female with medical history of oxygen and steroid dependent COPD, CAD s/p PCI in 2019 (stable cardiac status since that time),  HTN, who presented to ED after she bent over and lost her balance. She ended up falling backwards, hitting her hip. Patient has severe right hip and thigh pain.  She was unable to ambulate.  EMS has brought her for evaluation and is given her a total of 10 of morphine prior to arrival.  Patient denies headache head injury neck pain or back pain.  Isolated injury to right hip and thigh area.   Workup in ER revealed impacted right femur intertrochanteric fracture. Other labs look stable  She denies any other symptoms at this time, specifically denies CP or SOB. NO kidney or liver disease. She does use oxygen especially at night. She is dependent on steroids    Subjective    :  Patient seen and evaluated. Overnight events noted. Patient is currently doing well on BiPAP  Lethargic and sedated from Ativan &  Morphine  Per the patient she been having chest pain all night long. EKG was done by the on-call showed ST depression that is not new. Seen by cardiology and suspect demand ischemia possible concern for PE as well given the dyspnea. She was placed on oxygen for the work of breathing. Concern for anxiety as well. Troponins are elevated and being trended  Her blood pressure medications were held yesterday secondary to soft blood pressures per nursing. : Patient having chest pain since morning. Was given nitro and currently on Nitropatch.   Cardiology managing chest pain at bedside and plan is to do cardiac catheterization today. Other than that per patient she is feeling all right. She is still has hip pain after surgery. Denies any nausea, vomiting, palpitations. As per her, her pain is sharp and is in the center of the chest and she feels cold radiation to bilateral hands    Assessment & Plan:   Other fracture of right femur, initial encounter for closed fracture ([address] (2/4/2022)  Patient is status post surgery on February 4, 2022  Further management per orthopedics  Patient being evaluated by PT and OT already  A PPD has been placed  Will consult case management for discharge planning  Resume Plavix postop per Ortho recommendations  Continue remote telemetry due to history of coronary artery disease    February 7: TSH and vitamin D level ordered. Decrease the dose of Tylenol to keep it less than 3 g in 24 hours. Lidocaine patch ordered. Defer resumption of Plavix to cardiology. Chest pain: Unclear etiology. Plan for cardiac catheterization today. CT negative for pulmonary embolism. Abnormal CT/pneumonia: Patient has new spot on her lung but timing does not correlate with malignancy. Pulmonary on board. On levofloxacin for pneumonia treatment. COPD exacerbation  Continue with home oxygen requirements  Continue steroids  Continue nebs  Dr. Mariah Aranda pulmonologist if needed    February 7: Appreciate pulmonary recs. Continue steroid and levofloxacin. Hyperlipidemia  Continue home medication    Essential hypertension  Continue home medication    Coronary artery disease status post percutaneous coronary angioplasty  Plavix on hold, continue aspirin  Reinstate Plavix when okay per Ortho  Continue beta-blocker, ACE, statin  Restart her home as needed nitroglycerin    Sinus tachycardia  Patient was given Cardizem  Home Lopressor reinstated          Dispo/Discharge Planning:    Pending clinical course. PT/OT.   Discharge to rehab per the recommendations. Diet:  DIET NPO  DVT PPx: On heparin drip at present. Code status: Full Code     Patient is AOx3. She wants Vick Hind significant other to be power of  and wants to be full code. I offered and per her, her family will be here shortly. She wants to update family by herself and she will asked her nurse to page me if she or her family has any questions. She verbalized understanding that her condition meditated in spite of treatment. I have left my card with contact information and goals  during hospitalization for the patient.     Hospital Problems as of 2/7/2022 Date Reviewed: 11/9/2021          Codes Class Noted - Resolved POA    Closed right hip fracture [facility]) ICD-10-CM: S72.001A  ICD-9-CM: 820.8  2/4/2022 - Present Unknown        * (Principal) Other fracture of right femur, initial encounter for closed fracture [facility]) ICD-10-CM: X02.6O1V  ICD-9-CM: 821.00  2/4/2022 - Present Unknown        Pulmonary mass ICD-10-CM: R91.8  ICD-9-CM: 786.6  2/11/2020 - Present Unknown        CAD S/P percutaneous coronary angioplasty ICD-10-CM: I25.10, Z98.61  ICD-9-CM: 414.01, V45.82  5/31/2019 - Present Yes        Hyperlipidemia ICD-10-CM: E78.5  ICD-9-CM: 272.4  10/10/2016 - Present Yes        Essential hypertension, benign ICD-10-CM: I10  ICD-9-CM: 401.1  10/10/2016 - Present Yes        Acute respiratory failure with hypoxia [facility]) ICD-10-CM: J96.01  ICD-9-CM: 518.81  8/22/2013 - Present Unknown        COPD, very severe ([facility] ICD-10-CM: J44.9  ICD-9-CM: 917  8/20/2013 - Present Yes              Objective:     Patient Vitals for the past 24 hrs:   Temp Pulse Resp BP SpO2   02/07/22 0937 -- (!) 110 -- (!) 144/81 95 %   02/07/22 0851 -- (!) 114 -- 130/79 --   02/07/22 0741 98.1 °F (36.7 °C) (!) 113 18 127/83 99 %   02/07/22 0405 98.2 °F (36.8 °C) 93 16 (!) 122/48 99 %   02/07/22 0016 98 °F (36.7 °C) 94 18 116/68 99 %   02/06/22 1955 -- -- -- -- 90 %   02/06/22 1906 97.5 °F (36.4 °C) (!) 104 20 128/61 97 %   02/06/22 1718 99.1 °F (37.3 °C) (!) 102 22 (!) 113/58 96 %   02/06/22 1441 -- -- -- -- 100 %   02/06/22 1339 -- (!) 130 -- (!) 123/100 --   02/06/22 1249 -- (!) 112 -- 133/68 90 %   02/06/22 1059 97.9 °F (36.6 °C) 96 22 (!) 90/59 100 %     Oxygen Therapy  O2 Sat (%): 95 % (02/07/22 0937)  Pulse via Oximetry: 113 beats per minute (02/07/22 0741)  O2 Device: Nasal cannula (02/07/22 0741)  Skin Assessment: Clean, dry, & intact (02/07/22 0741)  O2 Flow Rate (L/min): 3 l/min (02/07/22 0741)  FIO2 (%): 70 % (02/06/22 0707)    Estimated body mass index is 22.94 kg/m² as calculated from the following:    Height as of this encounter: 5' 8\" (1.727 m). Weight as of this encounter: 68.4 kg (150 lb 14.4 oz). Intake/Output Summary (Last 24 hours) at 2/7/2022 0947  Last data filed at 2/7/2022 0405  Gross per 24 hour   Intake 826 ml   Output 1200 ml   Net -374 ml         Physical Exam:   Blood pressure (!) 144/81, pulse (!) 110, temperature 98.1 °F (36.7 °C), resp. rate 18, height 5' 8\" (1.727 m), weight 68.4 kg (150 lb 14.4 oz), SpO2 95 %. General:    Chronically ill-appearing. Tachypneic. Head:  Normocephalic, atraumatic  Eyes:  Sclerae appear normal.  Pupils equally round. ENT:     Moist oral mucosa  Neck:   Supple  CV:   RRR. No m/r/g. No jugular venous distension. Lungs:   Coarse breath sound otherwise CTAB. No wheezing, rhonchi, or rales. Respirations even, unlabored  Abdomen: Bowel sounds present. Soft, nontender, nondistended. Extremities: No cyanosis or clubbing. No edema  Skin:     No rashes and normal coloration. Warm and dry. Neuro:  AOx3, moving all 4 extremities, speech normal  Psych:  Normal mood and affect.       I have reviewed ordered lab tests and independently visualized imaging below:    Recent Labs:    Procedures done this admission:  Procedure(s):  RIGHT GAMMA NAIL INSERTION    All Micro Results     Procedure Component Value Units Date/Time    CULTURE, BLOOD [[id number]] Collected: 02/06/22 1400    Order Status: Completed Specimen: Blood Updated: 02/07/22 0656     Special Requests: --        RIGHT  HAND       Culture result: NO GROWTH AFTER 15 HOURS       CULTURE, BLOOD [[id number]] Collected: 02/06/22 1954    Order Status: Completed Specimen: Blood Updated: 02/07/22 0656     Special Requests: LEFT PICC LINE        Culture result: NO GROWTH AFTER 9 HOURS       RESPIRATORY VIRUS PANEL W/COVID-19, PCR [[id number]] Collected: 02/06/22 1844    Order Status: Completed Specimen: Nasopharyngeal Updated: 02/06/22 2135     Adenovirus NOT DETECTED        Coronavirus 229E NOT DETECTED        Coronavirus HKU1 NOT DETECTED        Coronavirus CVNL63 NOT DETECTED        Coronavirus OC43 NOT DETECTED        SARS-CoV-2, PCR NOT DETECTED        Metapneumovirus NOT DETECTED        Rhinovirus and Enterovirus NOT DETECTED        Influenza A NOT DETECTED        Influenza B NOT DETECTED        Parainfluenza 1 NOT DETECTED        Parainfluenza 2 NOT DETECTED        Parainfluenza 3 NOT DETECTED        Parainfluenza virus 4 NOT DETECTED        RSV by PCR NOT DETECTED        B. parapertussis, PCR NOT DETECTED        Bordetella pertussis - PCR NOT DETECTED        Chlamydophila pneumoniae DNA, QL, PCR NOT DETECTED        Mycoplasma pneumoniae DNA, QL, PCR NOT DETECTED       CULTURE, RESPIRATORY/SPUTUM/BRONCH Reynaldo Cap [[id number]]     Order Status: Sent Specimen: Sputum     MSSA/MRSA SC BY PCR, NASAL SWAB [[id number]] Collected: 02/04/22 0315    Order Status: Completed Specimen: Nasal swab Updated: 02/04/22 0640     Special Requests: NO SPECIAL REQUESTS        Culture result:       SA target not detected. A MRSA NEGATIVE, SA NEGATIVE test result does not preclude MRSA or SA nasal colonization.           COVID-19 RAPID TEST [[id number]] Collected: 02/03/22 2343    Order Status: Completed Specimen: Nasopharyngeal Updated: 02/04/22 0037     Specimen source NASAL        COVID-19 rapid test Not detected        Comment:      The specimen is NEGATIVE for SARS-CoV-2, the novel coronavirus associated with COVID-19. A negative result does not rule out COVID-19. This test has been authorized by the FDA under an Emergency Use Authorization (EUA) for use by authorized laboratories. Fact sheet for Healthcare Providers: Convention[employer] Uniqdate.co.nz  Fact sheet for Patients: [employer] Uniq[employer]co.nz       Methodology: Isothermal Nucleic Acid Amplification               SARS-CoV-2 Lab Results  \"Novel Coronavirus\" Test: No results found for: COV2NT   \"Emergent Disease\" Test: No results found for: EDPR  \"SARS-COV-2\" Test: No results found for: XGCOVT  \"Precision Labs\" Test: No results found for: RSLT  Rapid Test:   Lab Results   Component Value Date/Time    COVR Not detected 02/03/2022 11:43 PM            Labs: Results:       BMP, Mg, Phos Recent Labs     02/07/22  0501 02/06/22  0636 02/06/22  0409 02/05/22  0453 02/05/22  0453    141 142   < > 139   K 3.9 3.7 3.6   < > 3.2*    109* 108*   < > 106   CO2 29 26 24   < > 28   AGAP 4* 6* 10   < > 5*   BUN 11 10 9   < > 11   CREA 0.70 0.80 0.70   < > 0.80   CA 9.1 9.0 8.8   < > 8.6   * 195* 157*   < > 109*   MG  --   --   --   --  1.7    < > = values in this interval not displayed.       CBC Recent Labs     02/07/22  0501 02/06/22 1954 02/06/22  1846   WBC 8.9 9.2 9.5   RBC 2.60* 2.66* 2.65*   HGB 7.6* 7.8* 7.8*   HCT 23.7* 24.5* 24.6*    187 180   GRANS 90* 92* 93*   LYMPH 3* 3* 2*   EOS 3 2 2   MONOS 4 3* 3*   BASOS 0 0 0   IG 0 0 0   ANEU 7.9 8.5* 8.8*   ABL 0.3* 0.3* 0.2*   SANDHYA 0.3 0.2 0.2   ABM 0.4 0.3 0.3   ABB 0.0 0.0 0.0   AIG 0.0 0.0 0.0      LFT Recent Labs     02/07/22  0501 02/06/22  0409 02/05/22  0453   ALT 29 17 18   AP 64 66 60   TP 6.2* 6.5 5.9*   ALB 2.5* 2.8* 2.6*   GLOB 3.7* 3.7* 3.3   AGRAT 0.7* 0.8* 0.8*      Cardiac Testing Lab Results   Component Value Date/Time     (H) 06/06/2019 06:57 PM     08/21/2013 06:25 PM    BNP 59 08/19/2013 04:31 PM    BNP 24 08/19/2013 10:50 AM    Troponin-I, Qt. 0.16 () 06/07/2019 03:52 PM    Troponin-I, Qt. 0.21 () 06/07/2019 10:46 AM    Troponin-I, Qt. 0.25 () 06/07/2019 04:52 AM      Coagulation Tests Lab Results   Component Value Date/Time    Prothrombin time 13.4 05/29/2019 08:52 AM    INR 1.1 05/29/2019 08:52 AM    aPTT 28.8 02/06/2022 06:46 PM      A1c No results found for: HBA1C, [id number]   Lipid Panel Lab Results   Component Value Date/Time    Cholesterol, total 151 07/01/2019 07:20 AM    HDL Cholesterol 65 (H) 07/01/2019 07:20 AM    LDL, calculated 72.8 07/01/2019 07:20 AM    VLDL, calculated 13.2 07/01/2019 07:20 AM    Triglyceride 66 07/01/2019 07:20 AM    CHOL/HDL Ratio 2.3 07/01/2019 07:20 AM      Thyroid Panel Lab Results   Component Value Date/Time    TSH 1.500 08/30/2018 10:13 AM    TSH 2.740 10/03/2016 08:44 AM    T4, Free 1.35 10/08/2015 12:03 PM        Most Recent UA Lab Results   Component Value Date/Time    Color YELLOW 06/07/2019 01:20 AM    Appearance CLOUDY 06/07/2019 01:20 AM    Specific gravity 1.016 06/07/2019 01:20 AM    pH (UA) 5.5 06/07/2019 01:20 AM    Protein NEGATIVE  06/07/2019 01:20 AM    Glucose NEGATIVE  06/07/2019 01:20 AM    Ketone 15 (A) 06/07/2019 01:20 AM    Bilirubin NEGATIVE  06/07/2019 01:20 AM    Blood NEGATIVE  06/07/2019 01:20 AM    Urobilinogen 0.2 06/07/2019 01:20 AM    Nitrites NEGATIVE  06/07/2019 01:20 AM    Leukocyte Esterase SMALL (A) 06/07/2019 01:20 AM    WBC 3-5 06/07/2019 01:20 AM    RBC 0-3 06/07/2019 01:20 AM    Epithelial cells 0-3 06/07/2019 01:20 AM    Bacteria 0 06/07/2019 01:20 AM    Casts 0-3 06/07/2019 01:20 AM            All Micro Results     Procedure Component Value Units Date/Time    CULTURE, BLOOD [[id number]] Collected: 02/06/22 1400    Order Status: Completed Specimen: Blood Updated: 02/07/22 0656     Special Requests: --        RIGHT  HAND       Culture result: NO GROWTH AFTER 15 HOURS       CULTURE, BLOOD [[id number]] Collected: 02/06/22 1954    Order Status: Completed Specimen: Blood Updated: 02/07/22 0656     Special Requests: LEFT PICC LINE        Culture result: NO GROWTH AFTER 9 HOURS       RESPIRATORY VIRUS PANEL W/COVID-19, PCR [[id number]] Collected: 02/06/22 1844    Order Status: Completed Specimen: Nasopharyngeal Updated: 02/06/22 2135     Adenovirus NOT DETECTED        Coronavirus 229E NOT DETECTED        Coronavirus HKU1 NOT DETECTED        Coronavirus CVNL63 NOT DETECTED        Coronavirus OC43 NOT DETECTED        SARS-CoV-2, PCR NOT DETECTED        Metapneumovirus NOT DETECTED        Rhinovirus and Enterovirus NOT DETECTED        Influenza A NOT DETECTED        Influenza B NOT DETECTED        Parainfluenza 1 NOT DETECTED        Parainfluenza 2 NOT DETECTED        Parainfluenza 3 NOT DETECTED        Parainfluenza virus 4 NOT DETECTED        RSV by PCR NOT DETECTED        B. parapertussis, PCR NOT DETECTED        Bordetella pertussis - PCR NOT DETECTED        Chlamydophila pneumoniae DNA, QL, PCR NOT DETECTED        Mycoplasma pneumoniae DNA, QL, PCR NOT DETECTED       CULTURE, RESPIRATORY/SPUTUM/BRONCH Alisa Booze [[id number]]     Order Status: Sent Specimen: Sputum     MSSA/MRSA SC BY PCR, NASAL SWAB [[id number]] Collected: 02/04/22 0315    Order Status: Completed Specimen: Nasal swab Updated: 02/04/22 0466     Special Requests: NO SPECIAL REQUESTS        Culture result:       SA target not detected. A MRSA NEGATIVE, SA NEGATIVE test result does not preclude MRSA or SA nasal colonization. COVID-19 RAPID TEST [[id number]] Collected: 02/03/22 2343    Order Status: Completed Specimen: Nasopharyngeal Updated: 02/04/22 0037     Specimen source NASAL        COVID-19 rapid test Not detected        Comment:      The specimen is NEGATIVE for SARS-CoV-2, the novel coronavirus associated with COVID-19.   A negative result does not rule out COVID-19. This test has been authorized by the FDA under an Emergency Use Authorization (EUA) for use by authorized laboratories. Fact sheet for Healthcare Providers: ConventionUpdate.co.nz  Fact sheet for Patients: ConventionUpdate.co.nz       Methodology: Isothermal Nucleic Acid Amplification               Other Studies:  CT CHEST PULMONARY EMBOLISM    Result Date: 2/7/2022  CT CHEST WITH CONTRAST  2/6/2022 12:25 PM HISTORY:  ? PE; Recent hip surgery -Sudden shortness of breath and chest pain TECHNIQUE: The patient received 100 mL Isovue-370 nonionic IV contrast and axial images were obtained through the chest. Coronal reformatted images were generated. All CT scans at this facility used dose modulation, interactive reconstruction and/or weight based dosing when appropriate to reduce radiation dose to as low as reasonably achievable. COMPARISON:  10/8/2021 FINDINGS: There are no filling defects within the main or proximal segmental pulmonary artery suggestive of emboli. The thoracic aorta is well-opacified without dissection. There is no thoracic adenopathy. Small pleural effusions are present. There is lower lobe atelectasis. Diffuse centrilobular emphysematous changes are present. There is an irregular parenchymal opacity in the left lower lobe with tenting of the adjacent pleura. This has an aggregate diameter of 2.8 cm today (image 69). A small airway extends up to this lesion. A second irregular opacity in the left upper lobe (image 45) measures 13 mm in diameter and is similar in appearance to a October 2021 CT. A third indeterminate irregular lesion in the left lower lobe measures 12 mm in diameter (image 56) compared to approximate 7 mm at a comparable level on the previous study. Included portions of the upper abdomen demonstrate normal-appearing adrenal glands. Bone windows demonstrate no aggressive osseous lesions.  Age-indeterminate lower thoracic and upper lumbar compression deformities are new compared to the October 2021 scan. 1. No pulmonary embolism. 2. Irregular spiculated 2.8 cm lesion in the left lower lobe. The appearance is concerning for a primary lung neoplasm. Pulmonology consultation is recommended. Further evaluation with PET/CT or biopsy is recommended. 3. COPD 4. Irregular opacities in left upper lobe and left lower lobe in addition to the lesion identified is impression number 2. These are indeterminate lesions. Notification was sent to the ordering provider by secure text. This study was referred to the radiology navigator for follow-up assistance.        Current Meds:  Current Facility-Administered Medications   Medication Dose Route Frequency    nitroglycerin (NITROBID) 2 % ointment 1 Inch  1 Inch Topical BID    morphine injection 2 mg  2 mg IntraVENous Q4H PRN    albuterol-ipratropium (DUO-NEB) 2.5 MG-0.5 MG/3 ML  3 mL Nebulization Q6H PRN    albuterol (PROVENTIL VENTOLIN) nebulizer solution 2.5 mg  2.5 mg Nebulization Q6H RT    clonazePAM (KlonoPIN) tablet 0.5 mg  0.5 mg Oral Q6H PRN    methylPREDNISolone (PF) (SOLU-MEDROL) injection 40 mg  40 mg IntraVENous Q8H    guaiFENesin ER (MUCINEX) tablet 1,200 mg  1,200 mg Oral Q12H    budesonide (PULMICORT) 500 mcg/2 ml nebulizer suspension  1,000 mcg Nebulization BID RT    albuterol-ipratropium (DUO-NEB) 2.5 MG-0.5 MG/3 ML  3 mL Nebulization QID RT    levoFLOXacin (LEVAQUIN) 750 mg in D5W IVPB  750 mg IntraVENous Q24H    heparin 25,000 units in dextrose 500 mL infusion  12-25 Units/kg/hr IntraVENous TITRATE    nitroglycerin (NITROSTAT) tablet 0.4 mg  0.4 mg SubLINGual PRN    atorvastatin (LIPITOR) tablet 80 mg  80 mg Oral QHS    aspirin delayed-release tablet 81 mg  81 mg Oral DAILY    [Held by provider] budesonide-formoteroL (SYMBICORT) 160-4.5 mcg/actuation HFA inhaler 2 Puff  2 Puff Inhalation BID RT    metoprolol succinate (TOPROL-XL) XL tablet 100 mg  100 mg Oral DAILY  pantoprazole (PROTONIX) tablet 40 mg  40 mg Oral BID    lisinopriL (PRINIVIL, ZESTRIL) tablet 5 mg  5 mg Oral DAILY    [Held by provider] tiotropium bromide (SPIRIVA RESPIMAT) 2.5 mcg /actuation  2 Puff Inhalation DAILY    [Held by provider] predniSONE (DELTASONE) tablet 10 mg  10 mg Oral DAILY WITH BREAKFAST    alcohol 62% (NOZIN) nasal  1 Ampule  1 Ampule Topical Q12H    sodium chloride (NS) flush 5-40 mL  5-40 mL IntraVENous Q8H    sodium chloride (NS) flush 5-40 mL  5-40 mL IntraVENous PRN    acetaminophen (TYLENOL) tablet 1,000 mg  1,000 mg Oral Q6H    oxyCODONE IR (ROXICODONE) tablet 10 mg  10 mg Oral Q4H PRN    naloxone (NARCAN) injection 0.2-0.4 mg  0.2-0.4 mg IntraVENous Q10MIN PRN    senna-docusate (PERICOLACE) 8.6-50 mg per tablet 2 Tablet  2 Tablet Oral DAILY    ondansetron (ZOFRAN) injection 4 mg  4 mg IntraVENous Q4H PRN       Signed:  Markos Garcia MD    Part of this note may have been written by using a voice dictation software. The note has been proof read but may still contain some grammatical/other typographical errors.

## 2022-02-07 NOTE — PROGRESS NOTES
CM attempted to see pt for rounds but pt off the floor in CV lab. CM will continue to follow pt for discharge needs.

## 2022-02-07 NOTE — PROGRESS NOTES
OT daily note    Patient remains a HOLD this AM due to continued complaint of chest pain and heart cath scheduled for later today. Will check back on patient at a later date/time as schedule allows and is appropriate.         Thank you   HANNAH Nance/MARIUSZ

## 2022-02-07 NOTE — PROCEDURES
Stat cardiac catheterization with PCI to the LAD diagonal and to the distal right coronary artery was performed. See the [facility] report for details.

## 2022-02-07 NOTE — PROGRESS NOTES
February 7, 2022         Post Op day: 3 Days Post-Op Procedure(s) (LRB):  RIGHT GAMMA NAIL INSERTION (Right)      Admit Date: 2/3/2022  Admit Diagnosis: Other fracture of right femur, initial encounter for closed fracture ([facility] [address] [S72.8X1A]       Principle Problem: Other fracture of right femur, initial encounter for closed fracture ([address] Subjective: Doing ok, says she is scheduled for a heart cath today.  She says she has a history of stage IV emphysema and is always SOB, No new orthopedic complaints     Objective:   Vital Signs are Stable, No Acute Distress, Alert,  Dressing is Dry,  Neurovascular exam is normal.     Assessment / Plan :  Patient Active Problem List   Diagnosis Code    Personal history of tobacco use Z87.891    History of MI (myocardial infarction) I25.2    COPD, very severe ([address] J44.9    Acute respiratory failure with hypoxia ([address] J96.01    Hypoxemia R09.02    Hyperlipidemia E78.5    Essential hypertension, benign I10    Coronary artery disease involving native coronary artery of native heart without angina pectoris I25.10    Chronic anxiety F41.9    Ventricular ectopy I49.3    Chest pain R07.9    CAD S/P percutaneous coronary angioplasty I25.10, Z98.61    Hypertensive urgency I16.0    Chronic respiratory failure with hypoxia (HCC) J96.11    Carotid stenosis I65.29    Carotid stenosis, right I65.21    Centrilobular emphysema (HCC) J43.2    Pulmonary mass R91.8    H/O carotid endarterectomy Z98.890    Right ear pain H92.01    Ischemic heart disease, hx pci, cath/pci last 5/2019 I25.9    Palliative care patient Z51.5    DNR (do not resuscitate) Z66    Acute bilateral low back pain without sciatica M54.50    Closed right hip fracture ([address] S72.001A    Other fracture of right femur, initial encounter for closed fracture ([address] S72.8X1A      Patient Vitals for the past 8 hrs:   BP Temp Pulse Resp SpO2   02/07/22 0851 130/79 -- (!) 114 -- --   02/07/22 0741 127/83 98.1 °F (36.7 °C) (!) 113 18 99 %   22 0405 (!) 122/48 98.2 °F (36.8 °C) 93 16 99 %    Temp (24hrs), Av.1 °F (36.7 °C), Min:97.5 °F (36.4 °C), Max:99.1 °F (37.3 °C)    Body mass index is 22.94 kg/m².     Lab Results   Component Value Date/Time    HGB 7.6 (L) 2022 05:01 AM          S/P Procedure(s) (LRB):  RIGHT GAMMA NAIL INSERTION (Right)     Pain control right hip: consider changing pain medication after heart cath     Awaiting heart cath for today   Medical Mgmt per hospitalist  Anticoagulation plan: on heparin now  Continue PT  Fall Precautions  DC disp: per SW   F/U: 2 weeks postop for wound check and staple removal        Signed By: REKHA Beltran  2022,  9:15 AM

## 2022-02-07 NOTE — ROUTINE PROCESS
TRANSFER - IN REPORT:    Verbal report received from [facility] on Robertha Scales being received from [address] [place] [address] for routine progression of care      Report consisted of patients Situation, Background, Assessment and Recommendations(SBAR). Information from the following report(s) Procedure Summary was reviewed with the receiving nurse. Opportunity for questions and clarification was provided. Assessment completed upon patients arrival to unit and care assumed.

## 2022-02-07 NOTE — PROGRESS NOTES
Report received from [facility] RN. Procedural findings communicated. Intra procedural  medication administration reviewed. Progression of care discussed. Patient received into [id number] [facility] 2 post sheath removal.     Access site without bleeding or swelling yes    Dressing dry and intact yes    Patient instructed to limit movement to right upper extremity    Routine post procedural vital signs and site assessment initiated yes      Patient audibly wheezing, RT called to get a respiratory tx.

## 2022-02-07 NOTE — ROUTINE PROCESS
Bedside and Verbal shift change report given to self (oncoming nurse) by Mary Deal RN and [employer], RN (offgoing nurse). Report included the following information SBAR, Kardex, Intake/Output, MAR and Recent Results.

## 2022-02-07 NOTE — PROGRESS NOTES
PT note:     Patient remains a HOLD this AM due to continued complaint of chest pain and heart cath scheduled for later today. Will check back on patient at a later date/time as schedule allows and is appropriate. PM: Attempted to again to see patient per BID plan of care. Patient was still off the floor at time of attempt.      Biju Dorsey, PTA

## 2022-02-07 NOTE — PROGRESS NOTES
Luba Shirin [address]  Admission Date: 2/3/2022         Daily Progress Note: 2/7/2022    The patient's chart is reviewed and the patient is discussed with the staff. Background:     Patient is a 66 y.o.  female seen and evaluated at the request of Dr. Amairani Wood for hypoxia and concerns for PE. Pt just had CT scan completed, which was negative for PE, but does show some irregular spiculated 2.8 cm lesion in LLL. Pt is known to us in the office for very severe COPD with centrilobular emphysema. She was referred to [facility] for possible lung transplant but was denied. She had a navigation bronchoscopy with attempts at biopsy of a CANDELARIO nodule 2/11/20 which was difficult to visualize with radial probe and ultimately was non diagnostic. PET scan showed this area to have an SUV of 2.1. She was to have a bronchoscopy attempt at [facility] in July but repeat imaging performed here showed improvement in this area so the bronchoscopy has been cancelled. Using symbicort, spiriva, daliresp and azithromycin OP. Has completed pulmonary rehab. She wears O2 at night on 1 L. Recently completed steroid taper. She was admitted for R hip fracture and underwent surgery for repair on 2/4/22  This morning had an acute change in her O2 needs requiring high FiO2 on bipap. She was given 40mg lasix IV and sent for CTA chest due to concerns for PE. Pt has now been weaned to 4L NC, but still having increased work of breathing. She is wheezing and coughing, although not productive. Denies fevers. Per nursing, cano removed several days ago and has had issues with retention vs low UOP since. Bladder scans with up to 500ml in bladder requiring in and out caths. Subjective:     Currently on 5L with sat of 100%. NT-pro BNP elevated yesterday. PCT also mildly elevated and LVQ added. Had PCI today with LAD and distal RCA fixed. Feels much better post PCI.      Current Facility-Administered Medications   Medication Dose Route Frequency    nitroglycerin (NITROBID) 2 % ointment 1 Inch  1 Inch Topical BID    morphine injection 2 mg  2 mg IntraVENous Q4H PRN    acetaminophen (TYLENOL) tablet 650 mg  650 mg Oral Q8H    lidocaine 4 % patch 1 Patch  1 Patch TransDERmal Q24H    prasugreL (EFFIENT) tablet 60 mg  60 mg Oral ONCE    albuterol-ipratropium (DUO-NEB) 2.5 MG-0.5 MG/3 ML  3 mL Nebulization Q6H PRN    albuterol (PROVENTIL VENTOLIN) nebulizer solution 2.5 mg  2.5 mg Nebulization Q6H RT    clonazePAM (KlonoPIN) tablet 0.5 mg  0.5 mg Oral Q6H PRN    methylPREDNISolone (PF) (SOLU-MEDROL) injection 40 mg  40 mg IntraVENous Q8H    guaiFENesin ER (MUCINEX) tablet 1,200 mg  1,200 mg Oral Q12H    budesonide (PULMICORT) 500 mcg/2 ml nebulizer suspension  1,000 mcg Nebulization BID RT    albuterol-ipratropium (DUO-NEB) 2.5 MG-0.5 MG/3 ML  3 mL Nebulization QID RT    levoFLOXacin (LEVAQUIN) 750 mg in D5W IVPB  750 mg IntraVENous Q24H    heparin 25,000 units in dextrose 500 mL infusion  12-25 Units/kg/hr IntraVENous TITRATE    nitroglycerin (NITROSTAT) tablet 0.4 mg  0.4 mg SubLINGual PRN    atorvastatin (LIPITOR) tablet 80 mg  80 mg Oral QHS    aspirin delayed-release tablet 81 mg  81 mg Oral DAILY    [Held by provider] budesonide-formoteroL (SYMBICORT) 160-4.5 mcg/actuation HFA inhaler 2 Puff  2 Puff Inhalation BID RT    metoprolol succinate (TOPROL-XL) XL tablet 100 mg  100 mg Oral DAILY    pantoprazole (PROTONIX) tablet 40 mg  40 mg Oral BID    lisinopriL (PRINIVIL, ZESTRIL) tablet 5 mg  5 mg Oral DAILY    [Held by provider] tiotropium bromide (SPIRIVA RESPIMAT) 2.5 mcg /actuation  2 Puff Inhalation DAILY    [Held by provider] predniSONE (DELTASONE) tablet 10 mg  10 mg Oral DAILY WITH BREAKFAST    alcohol 62% (NOZIN) nasal  1 Ampule  1 Ampule Topical Q12H    sodium chloride (NS) flush 5-40 mL  5-40 mL IntraVENous Q8H    sodium chloride (NS) flush 5-40 mL  5-40 mL IntraVENous PRN    oxyCODONE IR (ROXICODONE) tablet 10 mg  10 mg Oral Q4H PRN    naloxone (NARCAN) injection 0.2-0.4 mg  0.2-0.4 mg IntraVENous Q10MIN PRN    senna-docusate (PERICOLACE) 8.6-50 mg per tablet 2 Tablet  2 Tablet Oral DAILY    ondansetron (ZOFRAN) injection 4 mg  4 mg IntraVENous Q4H PRN     Review of Systems    Constitutional: negative for fever, chills, sweats  Cardiovascular: negative for chest pain, palpitations, syncope, edema  Gastrointestinal:  negative for dysphagia, reflux, vomiting, diarrhea, abdominal pain, or melena  Neurologic:  negative for focal weakness, numbness, headache    Objective:     Vitals:    02/07/22 1315 02/07/22 1330 02/07/22 1345 02/07/22 1400   BP:  103/62 99/68 103/64   Pulse: 95 94 91 90   Resp:       Temp:       SpO2: 100% 100% 98% 100%   Weight:       Height:           Intake/Output Summary (Last 24 hours) at 2/7/2022 1413  Last data filed at 2/7/2022 0959  Gross per 24 hour   Intake 276 ml   Output 1900 ml   Net -1624 ml     Physical Exam:   Constitution:  the patient is well developed and in no acute distress  HEENT:  Sclera clear, pupils equal, oral mucosa moist  Respiratory: few scattered crackles  Cardiovascular:  RRR without M,G,R  Gastrointestinal: soft and non-tender; with positive bowel sounds. Musculoskeletal: warm without cyanosis. There is +1 lower extremity edema. Skin:  no jaundice or rashes  Neurologic: no gross neuro deficits     Psychiatric:  alert and oriented x 3    CXR:         IMPRESSION     1. Emphysema. No evidence of pneumonia or pulmonary edema.     2. Increased interstitial prominence, appearing chronic.     3. Nodular density in the left midlung, appearing similar to CT of October 8, 2021. Consider follow-up chest CT study. Chest CT 2/6/22: IMPRESSION        1. No pulmonary embolism.     2. Irregular spiculated 2.8 cm lesion in the left lower lobe. The appearance is  concerning for a primary lung neoplasm. Pulmonology consultation is recommended.   Further evaluation with PET/CT or biopsy is recommended.     3. COPD     4. Irregular opacities in left upper lobe and left lower lobe in addition to the  lesion identified is impression number 2. These are indeterminate lesions. LAB:  Recent Labs     02/07/22  0501 02/06/22 1954 02/06/22  1846   WBC 8.9 9.2 9.5   HGB 7.6* 7.8* 7.8*   HCT 23.7* 24.5* 24.6*    187 180   PCT  --   --  1.06*   LAC  --   --  2.6*     Recent Labs     02/07/22  0501 02/06/22  0636 02/06/22  0409 02/05/22  0453 02/05/22  0453    141 142   < > 139   K 3.9 3.7 3.6   < > 3.2*    109* 108*   < > 106   CO2 29 26 24   < > 28   * 195* 157*   < > 109*   BUN 11 10 9   < > 11   CREA 0.70 0.80 0.70   < > 0.80   MG  --   --   --   --  1.7   CA 9.1 9.0 8.8   < > 8.6   ALB 2.5*  --  2.8*  --  2.6*   *  --  27  --  18   ALT 29  --  17  --  18   AP 64  --  66  --  60    < > = values in this interval not displayed. Recent Labs     02/06/22  1846 02/06/22  1303 02/06/22  1032 02/06/22  0529   LAC 2.6*  --   --   --    TROPHS  --  11,008.4* 7,394.0* 676.4*   BNPNT 10,504*  --   --   --      Recent Labs     02/06/22  0654   PHI 7.32*   PCO2I 46.3*   PO2I 95   HCO3I 23.8     Recent Labs     02/06/22 1954 02/06/22  1400   CULT NO GROWTH AFTER 9 HOURS NO GROWTH AFTER 15 HOURS     Assessment and Plan:  (Medical Decision Making)   Principal Problem:    Other fracture of right femur, initial encounter for closed fracture ([facility] (2/4/2022)    Per primary    Active Problems:    COPD, very severe ([facility] (8/20/2013)    No bronchospasm at present. Acute respiratory failure with hypoxia ([address] (8/22/2013)    Can wean O2. Hyperlipidemia (10/10/2016)    Per primary      Essential hypertension, benign (10/10/2016)    Per primary      CAD S/P percutaneous coronary angioplasty (5/31/2019)    Now post PCI to LAD and RCA      Pulmonary mass (2/11/2020)    Will continue to follow. On LVQ for possible infection. Follow.       Closed right hip fracture ([facility] [address] (2/4/2022)    Per primary      More than 50% of the time documented was spent in face-to-face contact with the patient and in the care of the patient on the floor/unit where the patient is located.     Ernesto Noyola MD

## 2022-02-07 NOTE — PROGRESS NOTES
TRANSFER - OUT REPORT:    Verbal report given to [facility] RN on Corby Vasquez  being transferred to [facility](unit) for routine progression of care       Report consisted of patients Situation, Background, Assessment and   Recommendations(SBAR). Information from the following report(s) Procedure Summary was reviewed with the receiving nurse. Lines:   Peripheral IV 02/03/22 Right Antecubital (Active)   Site Assessment Clean, dry, & intact 02/07/22 0741   Phlebitis Assessment 0 02/07/22 0741   Infiltration Assessment 0 02/07/22 0741   Dressing Status Clean, dry, & intact 02/07/22 0741   Dressing Type Transparent;Tape 02/07/22 0741   Hub Color/Line Status Patent 02/07/22 0741   Alcohol Cap Used No 02/07/22 0741       Peripheral IV 02/06/22 Left;Upper Arm (Active)   Site Assessment Clean, dry, & intact 02/07/22 0741   Phlebitis Assessment 0 02/07/22 0741   Infiltration Assessment 0 02/07/22 0741   Dressing Status Clean, dry, & intact 02/07/22 0741   Dressing Type Transparent;Tape 02/07/22 0741   Hub Color/Line Status Patent; Infusing 02/07/22 0741        Opportunity for questions and clarification was provided.       Patient transported with:   Registered Nurse

## 2022-02-07 NOTE — PROGRESS NOTES
TRANSFER - OUT REPORT:    [facility]  RRA  2 stents Diag, POBA RCA  Versed 6 mg  Dilaudid 1 mg  Heparin 10,000 units  Effient 60 mg  Lopressor 10 mg  Band 15 ml  No bleeding/hematoma    Verbal report given to Juana(name) on Booker Randall  being transferred to [facility](unit) for routine progression of care       Report consisted of patients Situation, Background, Assessment and   Recommendations(SBAR). Information from the following report(s) SBAR and Procedure Summary was reviewed with the receiving nurse. Lines:   Peripheral IV 02/03/22 Right Antecubital (Active)   Site Assessment Clean, dry, & intact 02/07/22 0741   Phlebitis Assessment 0 02/07/22 0741   Infiltration Assessment 0 02/07/22 0741   Dressing Status Clean, dry, & intact 02/07/22 0741   Dressing Type Transparent;Tape 02/07/22 0741   Hub Color/Line Status Patent 02/07/22 0741   Alcohol Cap Used No 02/07/22 0741       Peripheral IV 02/06/22 Left;Upper Arm (Active)   Site Assessment Clean, dry, & intact 02/07/22 0741   Phlebitis Assessment 0 02/07/22 0741   Infiltration Assessment 0 02/07/22 0741   Dressing Status Clean, dry, & intact 02/07/22 0741   Dressing Type Transparent;Tape 02/07/22 0741   Hub Color/Line Status Patent; Infusing 02/07/22 0741        Opportunity for questions and clarification was provided.

## 2022-02-07 NOTE — PROGRESS NOTES
Radial compression band removed at 1815 after slowly reducing air from 15 cc to zero as per hospital protocol. No bleeding or hematoma noted. 2 x 2 gauze with tegaderm placed over puncture site. The affected extremity is warm and dry to the touch. Frequent vital signs printed and placed on bedside chart. Patient instructed to call if any bleeding noted on gauze. Patient verbalized understanding the nursing instructions.

## 2022-02-08 ENCOUNTER — APPOINTMENT (OUTPATIENT)
Dept: ULTRASOUND IMAGING | Age: 72
DRG: 480 | End: 2022-02-08
Attending: INTERNAL MEDICINE
Payer: MEDICARE

## 2022-02-08 PROBLEM — I34.0 NONRHEUMATIC MITRAL VALVE REGURGITATION: Chronic | Status: ACTIVE | Noted: 2022-02-08

## 2022-02-08 PROBLEM — D50.0 IRON DEFICIENCY ANEMIA DUE TO CHRONIC BLOOD LOSS: Chronic | Status: ACTIVE | Noted: 2022-02-08

## 2022-02-08 LAB
ALBUMIN SERPL-MCNC: 2.5 G/DL (ref 3.2–4.6)
ALBUMIN/GLOB SERPL: 0.7 {RATIO} (ref 1.2–3.5)
ALP SERPL-CCNC: 57 U/L (ref 50–136)
ALT SERPL-CCNC: 29 U/L (ref 12–65)
ANION GAP SERPL CALC-SCNC: 5 MMOL/L (ref 7–16)
AST SERPL-CCNC: 94 U/L (ref 15–37)
BILIRUB DIRECT SERPL-MCNC: <0.1 MG/DL
BILIRUB SERPL-MCNC: 0.3 MG/DL (ref 0.2–1.1)
BUN SERPL-MCNC: 18 MG/DL (ref 8–23)
CALCIUM SERPL-MCNC: 9.2 MG/DL (ref 8.3–10.4)
CHLORIDE SERPL-SCNC: 105 MMOL/L (ref 98–107)
CO2 SERPL-SCNC: 30 MMOL/L (ref 21–32)
CREAT SERPL-MCNC: 0.8 MG/DL (ref 0.6–1)
ERYTHROCYTE [DISTWIDTH] IN BLOOD BY AUTOMATED COUNT: 14.4 % (ref 11.9–14.6)
GLOBULIN SER CALC-MCNC: 3.5 G/DL (ref 2.3–3.5)
GLUCOSE SERPL-MCNC: 152 MG/DL (ref 65–100)
HCT VFR BLD AUTO: 22.8 % (ref 35.8–46.3)
HCT VFR BLD AUTO: 27.7 % (ref 35.8–46.3)
HGB BLD-MCNC: 7.1 G/DL (ref 11.7–15.4)
HGB BLD-MCNC: 8.9 G/DL (ref 11.7–15.4)
HISTORY CHECKED?,CKHIST: NORMAL
MAGNESIUM SERPL-MCNC: 1.9 MG/DL (ref 1.6–2.3)
MCH RBC QN AUTO: 29 PG (ref 26.1–32.9)
MCHC RBC AUTO-ENTMCNC: 31.1 G/DL (ref 31.4–35)
MCV RBC AUTO: 93.1 FL (ref 79.6–97.8)
NRBC # BLD: 0 K/UL (ref 0–0.2)
PLATELET # BLD AUTO: 214 K/UL (ref 150–450)
PMV BLD AUTO: 10 FL (ref 9.4–12.3)
POTASSIUM SERPL-SCNC: 4 MMOL/L (ref 3.5–5.1)
PROT SERPL-MCNC: 6 G/DL (ref 6.3–8.2)
RBC # BLD AUTO: 2.45 M/UL (ref 4.05–5.2)
SODIUM SERPL-SCNC: 140 MMOL/L (ref 136–145)
WBC # BLD AUTO: 10 K/UL (ref 4.3–11.1)

## 2022-02-08 PROCEDURE — 97110 THERAPEUTIC EXERCISES: CPT

## 2022-02-08 PROCEDURE — 74011250636 HC RX REV CODE- 250/636: Performed by: INTERNAL MEDICINE

## 2022-02-08 PROCEDURE — 77010033678 HC OXYGEN DAILY

## 2022-02-08 PROCEDURE — 74011250637 HC RX REV CODE- 250/637: Performed by: INTERNAL MEDICINE

## 2022-02-08 PROCEDURE — 86902 BLOOD TYPE ANTIGEN DONOR EA: CPT

## 2022-02-08 PROCEDURE — 85018 HEMOGLOBIN: CPT

## 2022-02-08 PROCEDURE — 80076 HEPATIC FUNCTION PANEL: CPT

## 2022-02-08 PROCEDURE — 99232 SBSQ HOSP IP/OBS MODERATE 35: CPT | Performed by: INTERNAL MEDICINE

## 2022-02-08 PROCEDURE — 99231 SBSQ HOSP IP/OBS SF/LOW 25: CPT | Performed by: PHYSICAL MEDICINE & REHABILITATION

## 2022-02-08 PROCEDURE — 74011000250 HC RX REV CODE- 250: Performed by: INTERNAL MEDICINE

## 2022-02-08 PROCEDURE — 94760 N-INVAS EAR/PLS OXIMETRY 1: CPT

## 2022-02-08 PROCEDURE — 85027 COMPLETE CBC AUTOMATED: CPT

## 2022-02-08 PROCEDURE — 80048 BASIC METABOLIC PNL TOTAL CA: CPT

## 2022-02-08 PROCEDURE — 36430 TRANSFUSION BLD/BLD COMPNT: CPT

## 2022-02-08 PROCEDURE — 86922 COMPATIBILITY TEST ANTIGLOB: CPT

## 2022-02-08 PROCEDURE — 82746 ASSAY OF FOLIC ACID SERUM: CPT

## 2022-02-08 PROCEDURE — 94640 AIRWAY INHALATION TREATMENT: CPT

## 2022-02-08 PROCEDURE — 83540 ASSAY OF IRON: CPT

## 2022-02-08 PROCEDURE — 97530 THERAPEUTIC ACTIVITIES: CPT

## 2022-02-08 PROCEDURE — 82607 VITAMIN B-12: CPT

## 2022-02-08 PROCEDURE — 65660000000 HC RM CCU STEPDOWN

## 2022-02-08 PROCEDURE — 97535 SELF CARE MNGMENT TRAINING: CPT

## 2022-02-08 PROCEDURE — 82728 ASSAY OF FERRITIN: CPT

## 2022-02-08 PROCEDURE — 86921 COMPATIBILITY TEST INCUBATE: CPT

## 2022-02-08 PROCEDURE — 86900 BLOOD TYPING SEROLOGIC ABO: CPT

## 2022-02-08 PROCEDURE — 76536 US EXAM OF HEAD AND NECK: CPT

## 2022-02-08 PROCEDURE — 87070 CULTURE OTHR SPECIMN AEROBIC: CPT

## 2022-02-08 PROCEDURE — 97116 GAIT TRAINING THERAPY: CPT

## 2022-02-08 PROCEDURE — 36592 COLLECT BLOOD FROM PICC: CPT

## 2022-02-08 PROCEDURE — P9016 RBC LEUKOCYTES REDUCED: HCPCS

## 2022-02-08 PROCEDURE — 86920 COMPATIBILITY TEST SPIN: CPT

## 2022-02-08 PROCEDURE — 97112 NEUROMUSCULAR REEDUCATION: CPT

## 2022-02-08 RX ORDER — HEPARIN SODIUM 5000 [USP'U]/ML
5000 INJECTION, SOLUTION INTRAVENOUS; SUBCUTANEOUS EVERY 8 HOURS
Status: DISCONTINUED | OUTPATIENT
Start: 2022-02-08 | End: 2022-02-10 | Stop reason: HOSPADM

## 2022-02-08 RX ORDER — ALBUTEROL SULFATE 90 UG/1
2 AEROSOL, METERED RESPIRATORY (INHALATION)
Status: DISCONTINUED | OUTPATIENT
Start: 2022-02-08 | End: 2022-02-10 | Stop reason: HOSPADM

## 2022-02-08 RX ORDER — METHIMAZOLE 5 MG/1
10 TABLET ORAL DAILY
Status: DISCONTINUED | OUTPATIENT
Start: 2022-02-08 | End: 2022-02-10 | Stop reason: HOSPADM

## 2022-02-08 RX ORDER — SODIUM CHLORIDE 9 MG/ML
250 INJECTION, SOLUTION INTRAVENOUS AS NEEDED
Status: DISCONTINUED | OUTPATIENT
Start: 2022-02-08 | End: 2022-02-10 | Stop reason: HOSPADM

## 2022-02-08 RX ADMIN — HEPARIN SODIUM 5000 UNITS: 5000 INJECTION INTRAVENOUS; SUBCUTANEOUS at 18:08

## 2022-02-08 RX ADMIN — METHYLPREDNISOLONE SODIUM SUCCINATE 30 MG: 40 INJECTION, POWDER, FOR SOLUTION INTRAMUSCULAR; INTRAVENOUS at 05:44

## 2022-02-08 RX ADMIN — OXYCODONE HYDROCHLORIDE 10 MG: 5 TABLET ORAL at 10:27

## 2022-02-08 RX ADMIN — BUDESONIDE 1000 MCG: 0.5 INHALANT RESPIRATORY (INHALATION) at 09:01

## 2022-02-08 RX ADMIN — ALBUTEROL SULFATE 2.5 MG: 2.5 SOLUTION RESPIRATORY (INHALATION) at 12:42

## 2022-02-08 RX ADMIN — ACETAMINOPHEN 650 MG: 325 TABLET ORAL at 13:41

## 2022-02-08 RX ADMIN — ASPIRIN 81 MG: 81 TABLET ORAL at 09:20

## 2022-02-08 RX ADMIN — CLONAZEPAM 0.5 MG: 0.5 TABLET ORAL at 21:07

## 2022-02-08 RX ADMIN — GUAIFENESIN 1200 MG: 600 TABLET ORAL at 21:07

## 2022-02-08 RX ADMIN — GUAIFENESIN 1200 MG: 600 TABLET ORAL at 09:20

## 2022-02-08 RX ADMIN — HEPARIN SODIUM 5000 UNITS: 5000 INJECTION INTRAVENOUS; SUBCUTANEOUS at 23:52

## 2022-02-08 RX ADMIN — ACETAMINOPHEN 650 MG: 325 TABLET ORAL at 21:07

## 2022-02-08 RX ADMIN — METOPROLOL TARTRATE 50 MG: 50 TABLET, FILM COATED ORAL at 00:00

## 2022-02-08 RX ADMIN — METOPROLOL TARTRATE 50 MG: 50 TABLET, FILM COATED ORAL at 05:44

## 2022-02-08 RX ADMIN — ALBUTEROL SULFATE 2.5 MG: 2.5 SOLUTION RESPIRATORY (INHALATION) at 15:38

## 2022-02-08 RX ADMIN — SODIUM CHLORIDE, PRESERVATIVE FREE 10 ML: 5 INJECTION INTRAVENOUS at 21:08

## 2022-02-08 RX ADMIN — METHYLPREDNISOLONE SODIUM SUCCINATE 30 MG: 40 INJECTION, POWDER, FOR SOLUTION INTRAMUSCULAR; INTRAVENOUS at 13:41

## 2022-02-08 RX ADMIN — METOPROLOL TARTRATE 50 MG: 50 TABLET, FILM COATED ORAL at 13:41

## 2022-02-08 RX ADMIN — METHYLPREDNISOLONE SODIUM SUCCINATE 30 MG: 40 INJECTION, POWDER, FOR SOLUTION INTRAMUSCULAR; INTRAVENOUS at 21:07

## 2022-02-08 RX ADMIN — TIOTROPIUM BROMIDE INHALATION SPRAY 2 PUFF: 3.12 SPRAY, METERED RESPIRATORY (INHALATION) at 09:01

## 2022-02-08 RX ADMIN — ALBUTEROL SULFATE 2.5 MG: 2.5 SOLUTION RESPIRATORY (INHALATION) at 05:51

## 2022-02-08 RX ADMIN — LEVOFLOXACIN 750 MG: 500 TABLET, FILM COATED ORAL at 18:08

## 2022-02-08 RX ADMIN — METHIMAZOLE 10 MG: 5 TABLET ORAL at 09:19

## 2022-02-08 RX ADMIN — ALBUTEROL SULFATE 2.5 MG: 2.5 SOLUTION RESPIRATORY (INHALATION) at 09:01

## 2022-02-08 RX ADMIN — PRASUGREL 10 MG: 10 TABLET, FILM COATED ORAL at 13:56

## 2022-02-08 RX ADMIN — ATORVASTATIN CALCIUM 80 MG: 80 TABLET, FILM COATED ORAL at 21:07

## 2022-02-08 RX ADMIN — BUDESONIDE 500 MCG: 0.5 INHALANT RESPIRATORY (INHALATION) at 19:52

## 2022-02-08 RX ADMIN — ALBUTEROL SULFATE 2.5 MG: 2.5 SOLUTION RESPIRATORY (INHALATION) at 19:51

## 2022-02-08 RX ADMIN — PANTOPRAZOLE SODIUM 40 MG: 40 TABLET, DELAYED RELEASE ORAL at 18:08

## 2022-02-08 RX ADMIN — METOPROLOL TARTRATE 50 MG: 50 TABLET, FILM COATED ORAL at 23:52

## 2022-02-08 RX ADMIN — Medication 1 AMPULE: at 21:07

## 2022-02-08 RX ADMIN — METOPROLOL TARTRATE 50 MG: 50 TABLET, FILM COATED ORAL at 18:08

## 2022-02-08 RX ADMIN — PANTOPRAZOLE SODIUM 40 MG: 40 TABLET, DELAYED RELEASE ORAL at 09:20

## 2022-02-08 RX ADMIN — Medication 1 AMPULE: at 09:20

## 2022-02-08 RX ADMIN — ACETAMINOPHEN 650 MG: 325 TABLET ORAL at 05:44

## 2022-02-08 NOTE — ROUTINE PROCESS
Bedside and Verbal shift change report given to Monroe Alcazar, RN and Melissa Shukla RN (oncoming nurse) by [facility]). Report included the following information SBAR, Kardex, Intake/Output, MAR and Recent Results.

## 2022-02-08 NOTE — ROUTINE PROCESS
Bedside and Verbal shift change report given to self (oncoming nurse) by Rahul Quevedo RN (offgoing nurse). Report included the following information SBAR, Kardex, Procedure Summary, Intake/Output, MAR, Recent Results and Cardiac Rhythm SR/ST.

## 2022-02-08 NOTE — PROGRESS NOTES
ACUTE OT GOALS:  (Developed with and agreed upon by patient and/or caregiver.)  1. Patient will complete upper body bathing and dressing with SET UP and adaptive equipment as needed. 2. Patient will complete lower body bathing and dressing with MIN A and adaptive equipment as needed. 2. Patient will complete toileting with MIN A.   3. Patient will complete grooming ADL standing at sink with CGA. 4. Patient will tolerate 25 minutes of OT treatment with 1-2 rest breaks to increase activity tolerance for ADLs. 5. Patient will complete functional transfers with SBA and adaptive equipment as needed. 6. Patient will tolerate 10 minutes BUE exercises to increase strength for safe, functional transfers.      Timeframe: 7 visits     OCCUPATIONAL THERAPY: Daily Note OT Treatment Day # 2    Dolores Loza is a 66 y.o. female   PRIMARY DIAGNOSIS: Other fracture of right femur, initial encounter for closed fracture ([facility] [address]  Other fracture of right femur, initial encounter for closed fracture ([facility] [address] [S72.8X1A]  Procedure(s) (LRB):  LEFT HEART CATH / CORONARY ANGIOGRAPHY (N/A)  PERCUTANEOUS CORONARY INTERVENTION (N/A)  1 Day Post-Op  [place] / [place] / Product Type: Medicare /   ASSESSMENT:     REHAB RECOMMENDATIONS: CURRENT LEVEL OF FUNCTION:  (Most Recently Demonstrated)   Recommendation to date pending progress:  Setting:   Short-term Rehab  Equipment:    To Be Determined Bathing:   Not tested  Dressing:   Not tested  Feeding/Grooming:   Standby Assistance  Toileting:   Maximal Assistance  Functional Mobility:   Minimal Assistance x 2 x RW     ASSESSMENT:  Ms. Johnny Bates continues to present with deficits in overall strength, activity tolerance, ADL performance, and functional mobility s/p R hip fracture. WBAT in RLE. Presents sitting on [facility] upon arrival. Patient perseverating on needing to be able to void today despite additional time required.  Required max to total A for antoine-care in static standing. Patient demonstrating good standing balance while antoine-care was performed. Min A x 2 x RW x additional verbal cues for steps to bedside chair. Patient is slow moving and anxious. Slowly progressing towards goals. Will continue OT efforts as indicated. SUBJECTIVE:   Ms. [facility] states, \"I need to concentrate.  \"    SOCIAL HISTORY/LIVING ENVIRONMENT:   Home Environment: Private residence  One/Two Story Residence: One story  Living Alone: No  Support Systems: Spouse/Significant Other,Child(raghavendra),Friend/Neighbor    OBJECTIVE:     PAIN: VITAL SIGNS: LINES/DRAINS:   Pre Treatment: Pain Screen  Pain Scale 1: Numeric (0 - 10)  Pain Intensity 1: 0  Post Treatment: 0   none  O2 Device: Nasal cannula     ACTIVITIES OF DAILY LIVING: I Mod I S SBA CGA Min Mod Max Total NT Comments   BASIC ADLs:              Bathing/ Showering [] [] [] [] [] [] [] [] [] [x]    Toileting [] [] [] [] [] [] [] [] [x] [] In static standing   Dressing [] [] [] [] [] [] [] [] [] [x]    Feeding [] [] [] [] [] [] [] [] [] [x]    Grooming [] [] [] [x] [] [] [] [] [] []    Personal Device Care [] [] [] [] [] [] [] [] [] []    Functional Mobility [] [] [] [] [] [x] [x] [] [] [] X 2 x RW   I=Independent, Mod I=Modified Independent, S=Supervision, SBA=Standby Assistance, CGA=Contact Guard Assistance,   Min=Minimal Assistance, Mod=Moderate Assistance, Max=Maximal Assistance, Total=Total Assistance, NT=Not Tested    MOBILITY: I Mod I S SBA CGA Min Mod Max Total  NT x2 Comments:   Supine to sit [] [] [] [] [] [] [] [] [] [x] []    Sit to supine [] [] [] [] [] [] [] [] [] [x] []    Sit to stand [] [] [] [] [] [x] [] [] [] [] [x]    Bed to chair [] [] [] [] [] [x] [] [] [] [] [x] X RW   I=Independent, Mod I=Modified Independent, S=Supervision, SBA=Standby Assistance, CGA=Contact Guard Assistance,   Min=Minimal Assistance, Mod=Moderate Assistance, Max=Maximal Assistance, Total=Total Assistance, NT=Not Tested    BALANCE: Good Fair+ Fair Fair- Poor NT Comments   Sitting Static [x] [] [] [] [] []    Sitting Dynamic [] [x] [] [] [] []              Standing Static [] [] [x] [] [] []    Standing Dynamic [] [] [x] [] [] []      PLAN:   FREQUENCY/DURATION: OT Plan of Care: 5 times/week for duration of hospital stay or until stated goals are met, whichever comes first.    TREATMENT:   TREATMENT:   ($$ Self Care/Home Management: 23-37 mins$$ Neuromuscular Re-Education: 8-22 mins   )  Co-Treatment PT/OT necessary due to patient's decreased overall endurance/tolerance levels, as well as need for high level skilled assistance to complete functional transfers/mobility and functional tasks  Self Care (25 Minutes): Self care including Toileting, Grooming and Energy Conservation Training to increase independence and decrease level of assistance required. Neuromuscular Re-education (15 Minutes): Neuromuscular Re-education included Balance Training, Coordination training, Postural training, Sitting balance training and Standing balance training to improve Balance, Coordination and Postural Control.   W/ transfers and steps from [facility] to bedside chair  TREATMENT GRID:  N/A    AFTER TREATMENT POSITION/PRECAUTIONS:  Chair, Needs within reach and RN notified    INTERDISCIPLINARY COLLABORATION:  RN/PCT, PT/PTA and OT/YOUNG    TOTAL TREATMENT DURATION:  OT Patient Time In/Time Out  Time In: 1041  Time Out: [address], OT

## 2022-02-08 NOTE — PROGRESS NOTES
Hospitalist Progress Note   Admit Date:  2/3/2022 10:44 PM   Name:  Jodie Mayo   Age:  66 y.o. Sex:  female  :  1950   MRN:  [id number]   Room:  [facility]/01    Presenting Complaint: Hip Injury    Reason(s) for Admission: Other fracture of right femur, initial encounter for closed fracture [facility]) [address] Interval History:   Jodie Mayo is a 66 y.o. female with medical history of oxygen and steroid dependent COPD, CAD s/p PCI in 2019 (stable cardiac status since that time),  HTN, who presented to ED after she bent over and lost her balance. She ended up falling backwards, hitting her hip. Patient has severe right hip and thigh pain.  She was unable to ambulate.  EMS has brought her for evaluation and is given her a total of 10 of morphine prior to arrival.  Patient denies headache head injury neck pain or back pain.  Isolated injury to right hip and thigh area.   Workup in ER revealed impacted right femur intertrochanteric fracture. Other labs look stable  She denies any other symptoms at this time, specifically denies CP or SOB. NO kidney or liver disease. She does use oxygen especially at night. She is dependent on steroids    Brief Hospital course: 65-year-old female with history of COPD, chronic hypoxic respiratory failure, coronary artery disease, hypertension admitted after mechanical fall and found to have hip fracture. She is status post surgery. After surgery, she started having chest pain. Ultimately she underwent cardiac catheterization on  and she is status post PCI. Also, her CT PE was negative for pulmonary embolism but showed pulmonary lesions. Given the timing of pulmonary reason, there is concern about pneumonia and she was started on Levaquin. Given patient fracture and ongoing anxiety/chest pain, thyroid profile was checked and she was found to have hyperthyroidism. : Feeling much better than yesterday.   Chest pain improved. Denies any shortness of breath, palpitation. Assessment & Plan:   Other fracture of right femur, initial encounter for closed fracture ([address] (2/4/2022)  Patient is status post surgery on February 4, 2022  On pain control. Rehab placement. .       Chest pain: Secondary to coronary artery disease status post PCI. On dual antiplatelet therapy and high-intensive statin. Appreciate cardiology recs. Abnormal CT/pneumonia: Patient has new spot on her lung but timing does not correlate with malignancy. Pulmonary on board. On levofloxacin for pneumonia treatment. COPD exacerbation  Continue steroid and Levaquin as per pulmonary provider. Anemia: No active bleeding. Given her recent acute coronary syndrome and PCI yesterday, 1 unit of PRBC ordered. Side effects benefit discussed with the patient. Patient is aware that it may take days for her to get blood transfusion. Will assess on daily basis based on patient clinical course. Iron studies, B59 and folic acid level ordered. Hyperthyroidism : Low TSH with elevated free T4 consistent with hyperthyroidism. As per patient she has history of intermittent hyperthyroidism. TSI and thyroid ultrasound ordered. Side effects and benefit discussed with the patient started patient on methimazole and she is aware of side effects of that medication as she has taken it in the past.  Will need endocrinology follow-up as outpatient         Essential hypertension  Continue home medication           Dispo/Discharge Planning:    Pending clinical course. PT/OT. Discharge to rehab per the recommendations. Diet:  ADULT DIET Regular; No Salt Added (3-4 gm)  DVT PPx: On heparin drip at present. Code status: Full Code     Patient is AOx3. She wants Lynnette significant other to be power of  and wants to be full code. I offered and per her, her family will be here shortly.   She wants to update family by herself and she will asked her nurse to page me if she or her family has any questions. She verbalized understanding that her condition meditated in spite of treatment. She already has my card provided during hospitalization.      Hospital Problems as of 2/8/2022 Date Reviewed: 11/9/2021          Codes Class Noted - Resolved POA    Nonrheumatic mitral valve regurgitation (Chronic) ICD-10-CM: I34.0  ICD-9-CM: 424.0  2/8/2022 - Present Unknown        Iron deficiency anemia due to chronic blood loss (Chronic) ICD-10-CM: D50.0  ICD-9-CM: 280.0  2/8/2022 - Present Unknown        Closed right hip fracture (HCC) ICD-10-CM: S72.001A  ICD-9-CM: 820.8  2/4/2022 - Present Unknown        * (Principal) Other fracture of right femur, initial encounter for closed fracture [facility]) ICD-10-CM: S66.7T3Y  ICD-9-CM: 821.00  2/4/2022 - Present Unknown        Pulmonary mass ICD-10-CM: R91.8  ICD-9-CM: 786.6  2/11/2020 - Present Unknown        CAD S/P percutaneous coronary angioplasty ICD-10-CM: I25.10, Z98.61  ICD-9-CM: 414.01, V45.82  5/31/2019 - Present Yes        Hyperlipidemia ICD-10-CM: E78.5  ICD-9-CM: 272.4  10/10/2016 - Present Yes        Essential hypertension, benign ICD-10-CM: I10  ICD-9-CM: 401.1  10/10/2016 - Present Yes        Acute respiratory failure with hypoxia [facility]) ICD-10-CM: J96.01  ICD-9-CM: 518.81  8/22/2013 - Present Unknown        COPD, very severe ([facility] [address] ICD-10-CM: J44.9  ICD-9-CM: 709  8/20/2013 - Present Yes              Objective:     Patient Vitals for the past 24 hrs:   Temp Pulse Resp BP SpO2   02/08/22 1358 -- -- -- (!) 107/52 --   02/08/22 1243 -- -- -- -- 98 %   02/08/22 0902 -- -- -- -- 97 %   02/08/22 0717 97.1 °F (36.2 °C) 87 20 (!) 96/57 98 %   02/08/22 0552 -- -- -- -- 94 %   02/08/22 0544 -- 89 -- (!) 101/55 --   02/08/22 0426 98.2 °F (36.8 °C) 83 20 (!) 100/54 95 %   02/08/22 0348 -- 86 -- -- --   02/07/22 2355 98 °F (36.7 °C) 63 22 (!) 113/58 98 %   02/07/22 2350 -- -- -- -- 99 %   02/07/22 2016 97.4 °F (36.3 °C) 94 20 (!) 104/58 97 % 02/07/22 2012 -- -- -- -- 99 %   02/07/22 2000 -- 94 -- -- --   02/07/22 1637 -- -- -- -- 100 %   02/07/22 1615 97.9 °F (36.6 °C) 85 20 (!) 102/54 100 %     Oxygen Therapy  O2 Sat (%): 98 % (02/08/22 1243)  Pulse via Oximetry: 86 beats per minute (02/08/22 1243)  O2 Device: Nasal cannula (02/08/22 1243)  Skin Assessment: Clean, dry, & intact (02/08/22 0845)  O2 Flow Rate (L/min): 2 l/min (02/08/22 1243)  FIO2 (%): 70 % (02/06/22 0707)    Estimated body mass index is 22.94 kg/m² as calculated from the following:    Height as of this encounter: 5' 8\" (1.727 m). Weight as of this encounter: 68.4 kg (150 lb 14.4 oz). Intake/Output Summary (Last 24 hours) at 2/8/2022 1459  Last data filed at 2/8/2022 0348  Gross per 24 hour   Intake 440 ml   Output 1025 ml   Net -585 ml         Physical Exam:   Blood pressure (!) 107/52, pulse 87, temperature 97.1 °F (36.2 °C), resp. rate 20, height 5' 8\" (1.727 m), weight 68.4 kg (150 lb 14.4 oz), SpO2 98 %. General:    Chronically ill-appearing. Tachypneic. Head:  Normocephalic, atraumatic  Eyes:  Sclerae appear normal.  Pupils equally round. ENT:     Moist oral mucosa  Neck:   Supple  CV:   RRR. No m/r/g. No jugular venous distension. Lungs:   Coarse breath sound otherwise CTAB. No wheezing, rhonchi, or rales. Respirations even, unlabored  Abdomen: Bowel sounds present. Soft, nontender, nondistended. Extremities: No cyanosis or clubbing. No edema  Skin:     No rashes and normal coloration. Warm and dry. Neuro:  AOx3, moving all 4 extremities, speech normal  Psych:  Normal mood and affect.       I have reviewed ordered lab tests and independently visualized imaging below:    Recent Labs:    Procedures done this admission:  Procedure(s):  LEFT HEART CATH / CORONARY ANGIOGRAPHY  PERCUTANEOUS CORONARY INTERVENTION    All Micro Results     Procedure Component Value Units Date/Time    CULTURE, RESPIRATORY/SPUTUM/BRONCH Loreto Reyes [[id number]] Collected: 02/08/22 7278    Order Status: Completed Specimen: Sputum Updated: 02/08/22 1228     Special Requests: NO SPECIAL REQUESTS        GRAM STAIN 0 TO 3 WBCS PER OIF      0 TO 1  EPITHELIAL CELLS SEEN            MODERATE GRAM POSITIVE COCCI            4+ MUCUS PRESENT        Culture result:       NO GROWTH AFTER SHORT PERIOD OF INCUBATION. FURTHER RESULTS TO FOLLOW AFTER OVERNIGHT INCUBATION. CULTURE, BLOOD [[id number]] Collected: 02/06/22 1400    Order Status: Completed Specimen: Blood Updated: 02/08/22 0628     Special Requests: --        RIGHT  HAND       Culture result: NO GROWTH 2 DAYS       CULTURE, BLOOD [[id number]] Collected: 02/06/22 1954    Order Status: Completed Specimen: Blood Updated: 02/08/22 0628     Special Requests: LEFT PICC LINE        Culture result: NO GROWTH 2 DAYS       RESPIRATORY VIRUS PANEL W/COVID-19, PCR [[id number]] Collected: 02/06/22 1844    Order Status: Completed Specimen: Nasopharyngeal Updated: 02/06/22 2135     Adenovirus NOT DETECTED        Coronavirus 229E NOT DETECTED        Coronavirus HKU1 NOT DETECTED        Coronavirus CVNL63 NOT DETECTED        Coronavirus OC43 NOT DETECTED        SARS-CoV-2, PCR NOT DETECTED        Metapneumovirus NOT DETECTED        Rhinovirus and Enterovirus NOT DETECTED        Influenza A NOT DETECTED        Influenza B NOT DETECTED        Parainfluenza 1 NOT DETECTED        Parainfluenza 2 NOT DETECTED        Parainfluenza 3 NOT DETECTED        Parainfluenza virus 4 NOT DETECTED        RSV by PCR NOT DETECTED        B. parapertussis, PCR NOT DETECTED        Bordetella pertussis - PCR NOT DETECTED        Chlamydophila pneumoniae DNA, QL, PCR NOT DETECTED        Mycoplasma pneumoniae DNA, QL, PCR NOT DETECTED       MSSA/MRSA SC BY PCR, NASAL SWAB [[id number]] Collected: 02/04/22 0315    Order Status: Completed Specimen: Nasal swab Updated: 02/04/22 0648     Special Requests: NO SPECIAL REQUESTS        Culture result:       SA target not detected. A MRSA NEGATIVE, SA NEGATIVE test result does not preclude MRSA or SA nasal colonization. COVID-19 RAPID TEST [[id number]] Collected: 02/03/22 2343    Order Status: Completed Specimen: Nasopharyngeal Updated: 02/04/22 0037     Specimen source NASAL        COVID-19 rapid test Not detected        Comment:      The specimen is NEGATIVE for SARS-CoV-2, the novel coronavirus associated with COVID-19. A negative result does not rule out COVID-19. This test has been authorized by the FDA under an Emergency Use Authorization (EUA) for use by authorized laboratories. Fact sheet for Healthcare Providers: [employer]  Fact sheet for Patients: [employer]       Methodology: Isothermal Nucleic Acid Amplification               SARS-CoV-2 Lab Results  \"Novel Coronavirus\" Test: No results found for: COV2NT   \"Emergent Disease\" Test: No results found for: EDPR  \"SARS-COV-2\" Test: No results found for: XGCOVT  \"Precision Labs\" Test: No results found for: RSLT  Rapid Test:   Lab Results   Component Value Date/Time    COVR Not detected 02/03/2022 11:43 PM            Labs: Results:       BMP, Mg, Phos Recent Labs     02/08/22 0422 02/07/22  0501 02/06/22  0636 02/06/22  0409 02/06/22  0409    139 141   < > 142   K 4.0 3.9 3.7   < > 3.6    106 109*   < > 108*   CO2 30 29 26   < > 24   AGAP 5* 4* 6*   < > 10   BUN 18 11 10   < > 9   CREA 0.80 0.70 0.80   < > 0.70   CA 9.2 9.1 9.0   < > 8.8   * 185* 195*   < > 157*   MG  --   --   --   --  1.9    < > = values in this interval not displayed.       CBC Recent Labs     02/08/22 0422 02/07/22  0501 02/06/22 1954 02/06/22  1846 02/06/22  1846   WBC 10.0 8.9 9.2   < > 9.5   RBC 2.45* 2.60* 2.66*   < > 2.65*   HGB 7.1* 7.6* 7.8*   < > 7.8*   HCT 22.8* 23.7* 24.5*   < > 24.6*    201 187   < > 180   GRANS  --  90* 92*  --  93*   LYMPH  --  3* 3*  --  2*   EOS  --  3 2  -- 2   MONOS  --  4 3*  --  3*   BASOS  --  0 0  --  0   IG  --  0 0  --  0   ANEU  --  7.9 8.5*  --  8.8*   ABL  --  0.3* 0.3*  --  0.2*   SANDHYA  --  0.3 0.2  --  0.2   ABM  --  0.4 0.3  --  0.3   ABB  --  0.0 0.0  --  0.0   AIG  --  0.0 0.0  --  0.0    < > = values in this interval not displayed.       LFT Recent Labs     02/08/22  0422 02/07/22  0501 02/06/22  0409   ALT 29 29 17   AP 57 64 66   TP 6.0* 6.2* 6.5   ALB 2.5* 2.5* 2.8*   GLOB 3.5 3.7* 3.7*   AGRAT 0.7* 0.7* 0.8*      Cardiac Testing Lab Results   Component Value Date/Time     (H) 06/06/2019 06:57 PM     08/21/2013 06:25 PM    BNP 59 08/19/2013 04:31 PM    BNP 24 08/19/2013 10:50 AM    Troponin-I, Qt. 0.16 () 06/07/2019 03:52 PM    Troponin-I, Qt. 0.21 () 06/07/2019 10:46 AM    Troponin-I, Qt. 0.25 () 06/07/2019 04:52 AM      Coagulation Tests Lab Results   Component Value Date/Time    Prothrombin time 13.4 05/29/2019 08:52 AM    INR 1.1 05/29/2019 08:52 AM    aPTT 28.8 02/06/2022 06:46 PM      A1c No results found for: HBA1C, USH9VRRJ, BEW1SWRS   Lipid Panel Lab Results   Component Value Date/Time    Cholesterol, total 151 07/01/2019 07:20 AM    HDL Cholesterol 65 (H) 07/01/2019 07:20 AM    LDL, calculated 72.8 07/01/2019 07:20 AM    VLDL, calculated 13.2 07/01/2019 07:20 AM    Triglyceride 66 07/01/2019 07:20 AM    CHOL/HDL Ratio 2.3 07/01/2019 07:20 AM      Thyroid Panel Lab Results   Component Value Date/Time    TSH 0.101 (L) 02/07/2022 05:07 PM    TSH 1.500 08/30/2018 10:13 AM    T4, Free 2.4 (H) 02/07/2022 05:07 PM    T4, Free 1.35 10/08/2015 12:03 PM        Most Recent UA Lab Results   Component Value Date/Time    Color YELLOW 06/07/2019 01:20 AM    Appearance CLOUDY 06/07/2019 01:20 AM    Specific gravity 1.016 06/07/2019 01:20 AM    pH (UA) 5.5 06/07/2019 01:20 AM    Protein NEGATIVE  06/07/2019 01:20 AM    Glucose NEGATIVE  06/07/2019 01:20 AM    Ketone 15 (A) 06/07/2019 01:20 AM    Bilirubin NEGATIVE  06/07/2019 01:20 AM Blood NEGATIVE  06/07/2019 01:20 AM    Urobilinogen 0.2 06/07/2019 01:20 AM    Nitrites NEGATIVE  06/07/2019 01:20 AM    Leukocyte Esterase SMALL (A) 06/07/2019 01:20 AM    WBC 3-5 06/07/2019 01:20 AM    RBC 0-3 06/07/2019 01:20 AM    Epithelial cells 0-3 06/07/2019 01:20 AM    Bacteria 0 06/07/2019 01:20 AM    Casts 0-3 06/07/2019 01:20 AM            All Micro Results     Procedure Component Value Units Date/Time    CULTURE, RESPIRATORY/SPUTUM/BRONCH Erby Davey STAIN [[id number]] Collected: 02/08/22 0233    Order Status: Completed Specimen: Sputum Updated: 02/08/22 1228     Special Requests: NO SPECIAL REQUESTS        GRAM STAIN 0 TO 3 WBCS PER OIF      0 TO 1  EPITHELIAL CELLS SEEN            MODERATE GRAM POSITIVE COCCI            4+ MUCUS PRESENT        Culture result:       NO GROWTH AFTER SHORT PERIOD OF INCUBATION. FURTHER RESULTS TO FOLLOW AFTER OVERNIGHT INCUBATION.           CULTURE, BLOOD [[id number]] Collected: 02/06/22 1400    Order Status: Completed Specimen: Blood Updated: 02/08/22 0628     Special Requests: --        RIGHT  HAND       Culture result: NO GROWTH 2 DAYS       CULTURE, BLOOD [[id number]] Collected: 02/06/22 1954    Order Status: Completed Specimen: Blood Updated: 02/08/22 0628     Special Requests: LEFT PICC LINE        Culture result: NO GROWTH 2 DAYS       RESPIRATORY VIRUS PANEL W/COVID-19, PCR [[id number]] Collected: 02/06/22 1844    Order Status: Completed Specimen: Nasopharyngeal Updated: 02/06/22 2135     Adenovirus NOT DETECTED        Coronavirus 229E NOT DETECTED        Coronavirus HKU1 NOT DETECTED        Coronavirus CVNL63 NOT DETECTED        Coronavirus OC43 NOT DETECTED        SARS-CoV-2, PCR NOT DETECTED        Metapneumovirus NOT DETECTED        Rhinovirus and Enterovirus NOT DETECTED        Influenza A NOT DETECTED        Influenza B NOT DETECTED        Parainfluenza 1 NOT DETECTED        Parainfluenza 2 NOT DETECTED        Parainfluenza 3 NOT DETECTED        Parainfluenza virus 4 NOT DETECTED        RSV by PCR NOT DETECTED        B. parapertussis, PCR NOT DETECTED        Bordetella pertussis - PCR NOT DETECTED        Chlamydophila pneumoniae DNA, QL, PCR NOT DETECTED        Mycoplasma pneumoniae DNA, QL, PCR NOT DETECTED       MSSA/MRSA SC BY PCR, NASAL SWAB [[id number]] Collected: 02/04/22 0315    Order Status: Completed Specimen: Nasal swab Updated: 02/04/22 1960     Special Requests: NO SPECIAL REQUESTS        Culture result:       SA target not detected. A MRSA NEGATIVE, SA NEGATIVE test result does not preclude MRSA or SA nasal colonization. COVID-19 RAPID TEST [[id number]] Collected: 02/03/22 2343    Order Status: Completed Specimen: Nasopharyngeal Updated: 02/04/22 0037     Specimen source NASAL        COVID-19 rapid test Not detected        Comment:      The specimen is NEGATIVE for SARS-CoV-2, the novel coronavirus associated with COVID-19. A negative result does not rule out COVID-19. This test has been authorized by the FDA under an Emergency Use Authorization (EUA) for use by authorized laboratories. Fact sheet for Healthcare Providers: [employer]  Fact sheet for Patients: [employer]       Methodology: Isothermal Nucleic Acid Amplification               Other Studies:  US THYROID/PARATHYROID/SOFT TISS    Result Date: 2/8/2022  Exam: Thyroid ultrasound. Indication: Hyperthyroidism Comparison: None. Findings: Right Lobe: The right lobe of the thyroid is normal in echogenicity and measures 3.8 x 1.1 x 1.6 cm. Subcentimeter spongiform benign nodule in the upper pole measuring up to 0.3 cm. No additional thyroid nodule in the right lobe. Left Lobe: The left lobe of the thyroid is normal in echogenicity and measures 4.1 x 1.4 x 2.0 cm. No thyroid nodule identified. Isthmus: The isthmus measure 0.3 cm. No thyroid nodule identified.      Subcentimeter benign spongiform nodule in the right lobe measuring up to 0.3 cm. Otherwise, unremarkable thyroid ultrasound.        Current Meds:  Current Facility-Administered Medications   Medication Dose Route Frequency    0.9% sodium chloride infusion 250 mL  250 mL IntraVENous PRN    methIMAzole (TAPAZOLE) tablet 10 mg  10 mg Oral DAILY    albuterol (PROVENTIL HFA, VENTOLIN HFA, PROAIR HFA) inhaler 2 Puff  2 Puff Inhalation Q6H PRN    levoFLOXacin (LEVAQUIN) tablet 750 mg  750 mg Oral Q24H    nitroglycerin (NITROBID) 2 % ointment 1 Inch  1 Inch Topical BID    morphine injection 2 mg  2 mg IntraVENous Q4H PRN    acetaminophen (TYLENOL) tablet 650 mg  650 mg Oral Q8H    lidocaine 4 % patch 1 Patch  1 Patch TransDERmal Q24H    metoprolol tartrate (LOPRESSOR) tablet 50 mg  50 mg Oral Q6H    prasugreL (EFFIENT) tablet 10 mg  10 mg Oral DAILY    methylPREDNISolone (PF) (SOLU-MEDROL) injection 30 mg  30 mg IntraVENous Q8H    albuterol (PROVENTIL VENTOLIN) nebulizer solution 2.5 mg  2.5 mg Nebulization QID RT    albuterol (PROVENTIL VENTOLIN) nebulizer solution 2.5 mg  2.5 mg Nebulization Q4H PRN    albuterol-ipratropium (DUO-NEB) 2.5 MG-0.5 MG/3 ML  3 mL Nebulization Q6H PRN    clonazePAM (KlonoPIN) tablet 0.5 mg  0.5 mg Oral Q6H PRN    guaiFENesin ER (MUCINEX) tablet 1,200 mg  1,200 mg Oral Q12H    budesonide (PULMICORT) 500 mcg/2 ml nebulizer suspension  1,000 mcg Nebulization BID RT    nitroglycerin (NITROSTAT) tablet 0.4 mg  0.4 mg SubLINGual PRN    atorvastatin (LIPITOR) tablet 80 mg  80 mg Oral QHS    aspirin delayed-release tablet 81 mg  81 mg Oral DAILY    [Held by provider] budesonide-formoteroL (SYMBICORT) 160-4.5 mcg/actuation HFA inhaler 2 Puff  2 Puff Inhalation BID RT    pantoprazole (PROTONIX) tablet 40 mg  40 mg Oral BID    tiotropium bromide (SPIRIVA RESPIMAT) 2.5 mcg /actuation  2 Puff Inhalation DAILY    alcohol 62% (NOZIN) nasal  1 Ampule  1 Ampule Topical Q12H    sodium chloride (NS) flush 5-40 mL  5-40 mL IntraVENous PRN    oxyCODONE IR (ROXICODONE) tablet 10 mg  10 mg Oral Q4H PRN    naloxone (NARCAN) injection 0.2-0.4 mg  0.2-0.4 mg IntraVENous Q10MIN PRN    senna-docusate (PERICOLACE) 8.6-50 mg per tablet 2 Tablet  2 Tablet Oral DAILY    ondansetron (ZOFRAN) injection 4 mg  4 mg IntraVENous Q4H PRN       Signed:  Kezia Siegel MD    Part of this note may have been written by using a voice dictation software. The note has been proof read but may still contain some grammatical/other typographical errors.

## 2022-02-08 NOTE — ROUTINE PROCESS
Bedside shift change report received from [facility], [facility].

## 2022-02-08 NOTE — PROGRESS NOTES
[facility] CARDIOLOGY PROGRESS NOTE           2/8/2022 10:03 AM    Admit Date: 2/3/2022      Subjective:   Patient remains frail and weak on high flow O2. She is status post PCI and stenting of the LAD and right coronary artery. ROS:  Cardiovascular:  As noted above    Objective:      Vitals:    02/08/22 0544 02/08/22 0552 02/08/22 0717 02/08/22 0902   BP: (!) 101/55  (!) 96/57    Pulse: 89  87    Resp:   20    Temp:   97.1 °F (36.2 °C)    SpO2:  94% 98% 97%   Weight:       Height:           Physical Exam:  General-No Acute Distress  Neck- supple, no JVD  CV- regular rate and rhythm 3/6 at apex. Lung-diminished bilaterally  Abd- soft, nontender, nondistended  Ext- no edema bilaterally. Skin- warm and dry    Data Review:   Recent Labs     02/08/22  0422 02/07/22  0501 02/06/22  0636 02/06/22  0409    139   < > 142   K 4.0 3.9   < > 3.6   MG  --   --   --  1.9   BUN 18 11   < > 9   CREA 0.80 0.70   < > 0.70   * 185*   < > 157*   WBC 10.0 8.9   < >  --    HGB 7.1* 7.6*   < > 9.0*   HCT 22.8* 23.7*   < >  --     201   < >  --     < > = values in this interval not displayed. Interpretation Summary         Left Ventricle: Left ventricle size is normal. Normal wall thickness. Normal wall motion. Low normal left ventricular systolic function. EF by 2D Simpsons Biplane is 52%. Normal diastolic function.   Aortic Valve: Mildly thickened cusps.   Mitral Valve: Mildly thickened leaflets. Mildly calcified anterior leaflet. Moderate to severe transvalvular regurgitation with an eccentrically directed jet and may underestimate severity. Transesophageal echo  recommended for further evaluation of potentially severe mitral regurgitation.   Aorta: Dilated annulus.       Assessment/Plan:     Principal Problem:    Other fracture of right femur, initial encounter for closed fracture ([address] (2/4/2022)  Managed per primary team     Active Problems:    COPD, very severe ([address] (8/20/201)  Patient on 2-5 L with nasal cannula oxygen. Management of COPD per pulmonary      Acute respiratory failure with hypoxia ([address] (8/22/2013)  Management per pulmonary. Patient does not appear markedly volume overloaded. She does have likely severe eccentric mitral regurgitation. This will need to be evaluated at some point time when respiratory status is stable. Hyperlipidemia (10/10/2016)  Stable on atorvastatin. Essential hypertension, benign (10/10/2016)  Blood pressure stable with metoprolol 50 mg every 6 hours. Will change to 100 mg every 12 hours. We will not add any additional therapy as patient's blood pressure is well controlled. CAD S/P percutaneous coronary angioplasty (5/31/2019)  Patient status post stenting yesterday of LAD and right coronary artery. She is on aspirin and Effient. Hemoglobin remains severely reduced and will need to monitor closely on ongoing dual antiplatelet therapy. Pulmonary mass (2/11/2020)  Managed per pulmonary      Closed right hip fracture (HCC) (2/4/2022)  Managed per primary team      Nonrheumatic mitral valve regurgitation (2/8/2022)  Moderate to severe mitral regurgitation on surface echocardiogram.  Would consider transesophageal echocardiogram either prior to discharge or as outpatient once patient's respiratory status has improved. Given her multiple medical conditions I doubt she is a surgical candidate. Iron deficiency anemia due to chronic blood loss (2/8/2022)  Hemoglobin 7.0. Given patient's hypoxia, CAD and mitral vegetation could consider transfusion to keep hemoglobin greater than 9. Likely require Lasix between units if this is planned.           Raheem Smart MD  2/8/2022 10:03 AM

## 2022-02-08 NOTE — PROGRESS NOTES
Respiratory Care Services     Policy Number:  [id number]    Title: Respiratory Care COPD Protocol    Effective Date: 1/12/14    Reviewed Date: 05/2018, 11/2020    Revised: 07/2019      I. Policy:   1. All patients who are ordered oxygen, inhaled or nebulized bronchodilators may be reviewed for a history of Chronic Obstructive Pulmonary Disease (COPD) and smoking status. The Respiratory Education Team will provide comprehensive COPD education to patients with a COPD diagnosis, and smoking cessation education to any patient who is a current smoker. 2.  Respiratory Care Services (RCS) will establish a standard of care for patients admitted with an exacerbation of COPD by creating a chronic disease management program to improve patient outcomes and reduce healthcare utilization. This shall include the use of heated high flow aerosol therapy for patients admitted with an acute exacerbation of COPD for the first 24-48 hours of admission. 3.  If the patient has a history of COPD, the physician should order the COPD protocol. The COPD protocol will allow the RCP (Respiratory Care Practitioner) to perform a detailed assessment of the patient and review medications to manage their disease. The goals of COPD assessment are to determine the severity of the disease, its impact on the patient's health status and the risk of future events (such as exacerbations, hospital admissions or death), in order to eventually, guide therapy. 4. RCS will follow best clinical practice guidelines using the LOLLY. GAURAV beltrán Ponaimee Guidelines from the [facility] for Chronic Obstructive Lung Disease (GOLD). II. Definition: An Acute Exacerbation of COPD is an acute event characterized by a worsening of the patient's respiratory symptoms that is beyond normal day-to-day variations and leads to a change in medication. Currently, the diagnosis of an exacerbation relies exclusively on the clinical presentation. III. Responsibility: Director of [address] [place] [address], Respiratory Care Practitioners and Respiratory Education Team.    IV.  Procedure for patients admitted with an Exacerbation of COPD  A. Responsibilities for Respiratory care practitioners assigned to the floors:   1. Once the COPD protocol is ordered through [address] [place] [address] will acknowledge and respond to the order. 2. The RCP shall place the patient on the heated high flow oxygen therapy unit to maintain SpO2 of > 90% for a period of 24-48 hours as tolerated. 3. Wean FIO2 and flow as appropriate based on above oxygen saturation maintenance and patient requirements. Perform daily room air oxygen saturation checks for all COPD patients who are currently receiving oxygen and are not on home oxygen therapy. 4. The RCP will next complete the Patient Assessment tool in [facility] to determine the appropriate frequency of the bronchodilator therapy. Changes will be made to the bronchodilator frequency based on the patient's results. Refer to Policy #[id number] for procedure. 5. A three step ambulatory oxygen saturation test will be completed within 24-48 hours of discharge as ordered. B. Responsibilities for Respiratory Education Team  1. The RCP will perform an FEV1 on the patient to determine the severity of the COPD. (See Table 2.4)    2. The RCP will complete a COPD Assessment Test (CAT). a. The CAT score is done once per admission. b.  The COPD Assessment Test is an 8-item one-dimensional measure of health impairment in COPD. (See Table 2.3). 3. The RCP will ask the patient the number of COPD exacerbations the patient has had within the previous 12 months and the RCP will perform a chart review as well. 4. The RCP will take the score from the CAT test and the number of exacerbation within the previous 12 months to determine the proper group placement for medication administration.    5. An understanding of the impact of COPD on an individual patient combines the symptomatic assessment (CAT) the patient's spirometric classification and/or exacerbation history. (This approach of combined assessment is illustrated in Figure 2.4). 6. The Patient Category Status is based on the number of COPD exacerbations in one year's time that which would correlate with a CAT Score level. (See Figure 4). 7. As illustrated in Figure 7, the GOLD- Initial Pharmacologic Management of COPD guideline outline suggests medications for each patient category broken down into 3 categories;    a. Recommended First Choice  b. Alternative Choice  c. Other Possible Treatments   8. RCP will review patient's ordered respiratory medications to verify consistency with GOLD guidelines. a. The inpatient regimen will be evaluated and compared to their outpatient     home regimen. b. Recommendations for adjustments to any of these medications for appropriate treatment at patient's stage of COPD will be discussed with patient's physician.  c. Reasons for recommendations may include patient's inability to properly perform inhalation device technique as identified in education sessions with RCP and/or inconsistencies between stage of COPD and therapy based on GOLD guidelines (2017). 9. The Respiratory Education Team will provide comprehensive COPD education to patients with a diagnosis of COPD to:   a. Increase awareness among COPD patients   b. Improve their abilities to better control COPD symptoms  c. Recognize COPD exacerbations   d. Prevent/reduce hospitalization  10. The following topics will be addressed:  a. Disease Pathophysiology   b. Medication Management  c. Identification and Treatment of AECOPD  d. Breathing Retraining  e. Risk Factor Reduction  f. Improving Energy and Pacing Activity   g. Controlling stress  h. Oxygen Use  i. Smoking cessation  11.  Education will be provided using tools such as:   a. A COPD booklet identifying the above topics with questions at the end of each section to assess learning.   b. A COPD DVD will be shown to reinforce material covered in the booklet. c. Patients will be provided an aerochamber and instructed in its use if they are ordered MDI therapy while in the hospital or at home. d. The RCP shall instruct the patient on their respiratory discharge medications prior to discharge if needed. 12. Smoking cessation education will be provided to patients as needed covering the following topics:  a. Preparing to quit  b. Planning for challenges  c. Available resources for help  d. Quitting worksheet  13. DVD Follow-up  a. A DVD will be shown to the patient that gives powerful tips to help work towards a quit day and dispels myths and explains withdrawal.   b. Strategies to help a patient quit smoking will be addressed using the Gold Guidelines. (See Figure 8). c. If patient is ready to quit smoking and agrees, an order will be requested for a Nicoderm patch. 14. Prior to discharge, the patient will be given a COPD Action Plan for these purposes:  a. To help them manage their COPD   b. To enhance medication adherence by patient   c. To address any issues with medication administration technique. d. To know when to call for help.                   Figure 3: GOLD Combined Assessment of COPD   Symptoms:  Less Symptoms    (CAT less than 10): patient is (A) or (C)  More Symptoms (CAT greater than or equal to 10): patient is (B) or (D)     Airflow Limitation:  Low Risk (GOLD 1 or 2): patient is (A) or (B)  High Risk (GOLD 3 or 4): patient is (C) or (D)     Exacerbations:  Low Risk (less than or equal to 1 per year): patient is (A) or (B)  High Risk (greater than or equal to 2 per year): patient is (C) or (D)    Figure 4  Patient Category Exacerbations / Year CAT Score     A Less than or equal to 1 Less than 10     B Less than or equal to 1 Greater than or equal to 10     C Greater than or equal to 2 Less than 10     D Greater than or equal to 2 Greater than or equal to 10       Figure 5                                       The CJ Index  Variable Points on CJ  Index    0 1 2 3   FEV1 ( %  Predicted) > 65 50-64 36-49 <35   6-Minute Walk Test (meters) >350 250-349 150-249 <149   MMRC Dyspnea Scale 0-1 2 3 4   Body Mass Index >21 <21     Figure 6  Results:     Total Criteria Point Count:  _______   Approximate 4 Year Survival Interpretation  0-2 points:             80%   3-4 Points:             67%   5-6 Points:             57%   7-10 Points:           18%           Figure 7: GOLD-Initial Pharmacologic Management of COPD*    Patient Group Recommended   First Choice Alternative Choice Other Possible Treatments**       A   Short -acting anticholinergic prn  or  Short-acting beta2-agonist prn Long-acting anticholinergic  or  Long-acting beta2-agonist  or   Short-acting beta2-agonist and short-acting anticholinergic          Theophylline     B  Long-acting anticholinergic   or  Long-acting beta2-agonist Long-acting anticholinergic and long-acting beta2-agonist Short-acting beta2-agonist  and/or Short-acting anticholinergic  Theophylline       C       Inhaled corticosteroid +   long-acting beta2-agonist  or  Long-acting anticholinergic Long-acting anticholinergic and long-acting beta2-agonist  or  Long-acting anticholinergic and phosphodiesterase-4 inhibitor  or   Long-acting beta2-agonist and phosphodiesterase-4 inhibitor   Short-acting beta2-agonist  and/or  Short-acting anticholinergic    Theophylline           D         Inhaled corticosteroid + long-acting beta2-agonist  and/or  Long-acting anticholinergic Inhaled corticosteroid + long-acting beta2-agonist and long-acting anticholinergic  or  Inhaled corticosteroid + long-acting beta2-agonist and phosphodiesterase-4 inhibitor  or  Long-acting anticholinergic and long-acting beta2-agonist  or   Long-acting anticholinergic and phosphodiesterase- inhibitor             Carbocysteine    Short-acting beta2-agonist  and/or  Short-acting anticholinergic    Theophylline      *Medications in each box are mentioned in alphabetical order, and therefore not necessarily in order of preference. ** Medications in this column can be used alone or in combination with other options in the Recommended First Choice and Alternative Choice columns. Figure 8. Brief Strategies to Help the Patient Willing to Quit Smoking     1. ASK: Systematically identify all tobacco users at every visit. Implement a hospital wide system that ensures that for every patient a tobacco-use status is identified and documented. 2. ADVISE: Strongly urge all tobacco users to quit. In a clear, strong, and personalized manner, urge every tobacco user to quit. 3. ASSESS: Determine willingness to make a quit attempt. Ask every tobacco user if he or she is willing to make a quit attempt at this time. (Eg.Within the next 30 days). 4. ASSIST: Aid the patient in quitting. Help the patient with a quit plan, provide practical counseling, provide intra-treatment, social support, and help the patient obtain extra-treatment social support, recommend use of approved pharmacotherapy except in special circumstances, and provide supplementary materials. 5. ARRANGE: Schedule follow-up contact either in person or via telephone. References:  [place] [facility] Best Practices. AromatherapyCrystals.be. COPD GOLD Initiative  Global Initiative for Chronic Obstructive Lung Disease. Global Strategy for the Diagnosis, Management, and Prevention of Chronic Obstructive Pulmonary Disease. Revised 2017. Respiratory Care Services   Policy Number:  [id number]-    Title: Asthma Treatment Protocol ER    Effective Date: 04/09/2012    Revised Date: 06/06/12, 11/15/2017    Reviewed Date: 06/13, 05/12/14, 03/10/15, 03/16, 06/17, 05/18, 07/19, 11/2020   I.   Policy: The respiratory care practitioner (RCP) shall utilize the following protocol to initiate therapy to the patient presenting with asthmatic symptoms in the ER.    II. Purpose: The Asthma Treatment Protocol ER is designed to allow for identification of the severity of the patient's asthma exacerbation, to expedite the delivery of pulmonary medications to break the exacerbation and prevent the need for hospitalization, and to identify those patients who will require hospitalization for further treatment. III. Responsibility: Director of Emergency Room, Director of [address] [place] [address],         ER Nurses and [address] [place] [address]. The Respiratory Care Practitioner (RCP) will be primarily responsible for managing the protocol.  .   IV. Indications:  Patients 15years of age and older that have been triaged or diagnosed with   asthma exacerbation shall be indicated for the ER Asthma Treatment Protocol. A physician order will be required to initiate the protocol. V. Hazards / Complications: Any patient adverse reaction during treatment shall be reported to the physician who will decide whether to continue the protocol. VI. Initiation of Protocol:   A. The Nurse, RCP or Physician will enter an order for RT Consult with a comment that reads Asthma Protocol  B. The RCP will assess the patient in Triage and implement the correct plan of action. Assessment shall include the followin. brief history and physical examination  2. level of consciousness  3. auscultation of breath sounds   4. use of respiratory accessory muscles   5. color  6. heart rate   7. SpO2 on room air   8. respiratory rate and pattern  9. FEV1- use nomogram to calculate the patient's predicted  10. determination of last asthma exacerbation  C. Document the pre and post FEV1 in the Progress Notes. D. The order for Albuterol and appropriate dose will be entered per protocol by the RCP and the order choice should be Albuterol Concentrate for asthma protocol. VII. Treatment Procedure:    A.  Initial Treatment is based on FEV1- use nomogram to calculate patient's predicted FEV1.    1.  Patients with FEV1 > 50% predicted with mild to moderate symptoms shall receive:  a. Oxygen to maintain SpO2 >90%. b. 5 mg. diluted albuterol in the AeroEclipse or Aerogen nebulizer up to 3 doses in first hour. c. Consider heliox therapy. d. Assess patient response 10 - 15 minutes after each treatment including:  i. FEV1  ii. Vital signs  iii. Breath sounds  iv. Accessory muscle use  e. SpO2  2. Patients with FEV1 < 50% predicted   a. Oxygen to maintain SpO2 >90%. b.   administer Heliox UA  therapy  c.   UA with high dose (15 mg) diluted albuterol in AeroEclipse or Aerogen           nebulizer. d.   oral prednisone 60 mg. (by RN)   e. Notify physician if condition worsens. f.   Assess patient response 10-15 minutes after treatment:  i.  FEV1  ii. vital signs  iii. breath sounds  iv. accessory muscle use  v. SpO2  3. Patients assessed to be in impending or actual respiratory failure:  a. Notify physician and RN immediately. b. Resuscitate with ACLS Guidelines. c. Give high dose albuterol (15 mg) in Aeroeclipse or Aerogen nebulizer. d. Likely ICU admission  e. Treat per physician order. B. Repeat assessment in 1 hour after last treatment. 1. FEV1  2. breath sounds  3. accessory muscle use  4. SpO2  5. Communicate findings to team  6. Time to decide Admit or Discharge. C. A Good Response would include the followin. FEV1 >70% predicted  2. Response is sustained 1 hour after last therapy. 3. No distress and normal physical exam.  D. Report condition  7. Discuss with physician and nurse. 8. Develop plan of care. 9. Time to [address] - Discharge  E. An Incomplete Response would include the followin. FEV1 > 50% but <70% predicted   2. mild to moderate symptoms  F. Repeat Treatment. 1.  Administer 5 mg undiluted albuterol in Aeroeclipse or Aerogen up to 3 doses.   G. Assess patient response 10-15 minutes after each treatment. a. FEV1  b. breath sounds  c. accessory muscle use  d. SpO2  e. HR, RR  H. Report condition to physician or RN  1. Discuss and develop plan of care. 2. Time to [address] or Discharge  H. A Poor Response would include the followin. FEV1 < 50%  and PCO2 >44 mm hg (unless patient is CO2 retainer)  3. severe persistent symptoms  4. confused or drowsy  5. Report condition to physician and nurse  6. Discuss and develop plan of care. 7. Time to [address] I. Plan of Care  5. If patient is being admitted, discuss treatment plan with team and communicate with patient/family. 6. If patient is to be discharged, RT will complete Patient Education process and instruct the patient in the use of their respiratory medications and therapy. VIII. Documentation  A. Document the following in the patient's EMR in [facility]:  1. Document Pre and Post FEV1 in the Progress notes section. 2.   Document UA therapy in the respiratory procedure section. 7. Document Patient and Family education in the Patient Education section. References:     1. National Asthma Education and Prevention Program - Expert Panel Report III, 2007.   _

## 2022-02-08 NOTE — PROGRESS NOTES
Problem: Falls - Risk of  Goal: *Absence of Falls  Description: Document Josestevie Croft Fall Risk and appropriate interventions in the flowsheet. Outcome: Progressing Towards Goal  Note: Fall Risk Interventions:  Mobility Interventions: Bed/chair exit alarm,Communicate number of staff needed for ambulation/transfer,Patient to call before getting OOB         Medication Interventions: Assess postural VS orthostatic hypotension,Teach patient to arise slowly,Patient to call before getting OOB    Elimination Interventions: Call light in reach,Patient to call for help with toileting needs,Toileting schedule/hourly rounds    History of Falls Interventions: Investigate reason for fall,Room close to nurse's station,Bed/chair exit alarm         Problem: Pressure Injury - Risk of  Goal: *Prevention of pressure injury  Description: Document Bobby Scale and appropriate interventions in the flowsheet.   Outcome: Progressing Towards Goal  Note: Pressure Injury Interventions:  Sensory Interventions: Assess changes in LOC,Minimize linen layers,Pressure redistribution bed/mattress (bed type)    Moisture Interventions: Absorbent underpads,Minimize layers,Internal/External urinary devices    Activity Interventions: Pressure redistribution bed/mattress(bed type),Increase time out of bed,PT/OT evaluation    Mobility Interventions: Pressure redistribution bed/mattress (bed type)    Nutrition Interventions: Document food/fluid/supplement intake    Friction and Shear Interventions: Lift team/patient mobility team,Minimize layers

## 2022-02-08 NOTE — PROGRESS NOTES
Luba Ripper [address]  Admission Date: 2/3/2022         Daily Progress Note: 2/8/2022    The patient's chart is reviewed and the patient is discussed with the staff. Background:   Patient is a 66 y.o.  female seen and evaluated at the request of Dr. Amairani Wood for hypoxia and concerns for PE. Pt just had CT scan completed, which was negative for PE, but does show some irregular spiculated 2.8 cm lesion in LLL. Pt is known to us in the office for very severe COPD with centrilobular emphysema. She was referred to [facility] for possible lung transplant but was denied. She had a navigation bronchoscopy with attempts at biopsy of a CANDELARIO nodule 2/11/20 which was difficult to visualize with radial probe and ultimately was non diagnostic. PET scan showed this area to have an SUV of 2.1. She was to have a bronchoscopy attempt at [facility] in July but repeat imaging performed here showed improvement in this area so the bronchoscopy has been cancelled. Using symbicort, spiriva, daliresp and azithromycin OP. Has completed pulmonary rehab. She wears O2 at night on 1 L. Recently completed steroid taper. She was admitted for R hip fracture and underwent surgery for repair on 2/4/22  This morning had an acute change in her O2 needs requiring high FiO2 on bipap. She was given 40mg lasix IV and sent for CTA chest due to concerns for PE. Pt has now been weaned to 4L NC, but still having increased work of breathing. She is wheezing and coughing, although not productive. Denies fevers. Per nursing, cano removed several days ago and has had issues with retention vs low UOP since. Bladder scans with up to 500ml in bladder requiring in and out caths. Subjective:     Patient had cath performed yesterday with PCI and POBA performed. This morning she is on 2L O2 and states that she has not had any further chest pains since her cath. About to go for AYALA.      Current Facility-Administered Medications Medication Dose Route Frequency    0.9% sodium chloride infusion 250 mL  250 mL IntraVENous PRN    methIMAzole (TAPAZOLE) tablet 10 mg  10 mg Oral DAILY    nitroglycerin (NITROBID) 2 % ointment 1 Inch  1 Inch Topical BID    morphine injection 2 mg  2 mg IntraVENous Q4H PRN    acetaminophen (TYLENOL) tablet 650 mg  650 mg Oral Q8H    lidocaine 4 % patch 1 Patch  1 Patch TransDERmal Q24H    metoprolol tartrate (LOPRESSOR) tablet 50 mg  50 mg Oral Q6H    prasugreL (EFFIENT) tablet 10 mg  10 mg Oral DAILY    methylPREDNISolone (PF) (SOLU-MEDROL) injection 30 mg  30 mg IntraVENous Q8H    albuterol (PROVENTIL VENTOLIN) nebulizer solution 2.5 mg  2.5 mg Nebulization QID RT    albuterol (PROVENTIL VENTOLIN) nebulizer solution 2.5 mg  2.5 mg Nebulization Q4H PRN    albuterol-ipratropium (DUO-NEB) 2.5 MG-0.5 MG/3 ML  3 mL Nebulization Q6H PRN    clonazePAM (KlonoPIN) tablet 0.5 mg  0.5 mg Oral Q6H PRN    guaiFENesin ER (MUCINEX) tablet 1,200 mg  1,200 mg Oral Q12H    budesonide (PULMICORT) 500 mcg/2 ml nebulizer suspension  1,000 mcg Nebulization BID RT    levoFLOXacin (LEVAQUIN) 750 mg in D5W IVPB  750 mg IntraVENous Q24H    nitroglycerin (NITROSTAT) tablet 0.4 mg  0.4 mg SubLINGual PRN    atorvastatin (LIPITOR) tablet 80 mg  80 mg Oral QHS    aspirin delayed-release tablet 81 mg  81 mg Oral DAILY    [Held by provider] budesonide-formoteroL (SYMBICORT) 160-4.5 mcg/actuation HFA inhaler 2 Puff  2 Puff Inhalation BID RT    pantoprazole (PROTONIX) tablet 40 mg  40 mg Oral BID    tiotropium bromide (SPIRIVA RESPIMAT) 2.5 mcg /actuation  2 Puff Inhalation DAILY    alcohol 62% (NOZIN) nasal  1 Ampule  1 Ampule Topical Q12H    sodium chloride (NS) flush 5-40 mL  5-40 mL IntraVENous PRN    oxyCODONE IR (ROXICODONE) tablet 10 mg  10 mg Oral Q4H PRN    naloxone (NARCAN) injection 0.2-0.4 mg  0.2-0.4 mg IntraVENous Q10MIN PRN    senna-docusate (PERICOLACE) 8.6-50 mg per tablet 2 Tablet  2 Tablet Oral DAILY    ondansetron (ZOFRAN) injection 4 mg  4 mg IntraVENous Q4H PRN     Review of Systems  Constitutional: negative for fever, chills, sweats  Cardiovascular: negative for chest pain, palpitations, syncope, edema  Gastrointestinal:  negative for dysphagia, reflux, vomiting, diarrhea, abdominal pain, or melena  Neurologic:  negative for focal weakness, numbness, headache  Objective:     Vitals:    02/08/22 0544 02/08/22 0552 02/08/22 0717 02/08/22 0902   BP: (!) 101/55  (!) 96/57    Pulse: 89  87    Resp:   20    Temp:   97.1 °F (36.2 °C)    SpO2:  94% 98% 97%   Weight:       Height:           Intake/Output Summary (Last 24 hours) at 2/8/2022 1138  Last data filed at 2/8/2022 0348  Gross per 24 hour   Intake 440 ml   Output 1025 ml   Net -585 ml     Physical Exam:   Constitution:  the patient is well developed and in no acute distress  HEENT:  Sclera clear, pupils equal, oral mucosa moist  Respiratory: Decreased but clear bilaterally  Cardiovascular:  RRR without M,G,R  Gastrointestinal: soft and non-tender; with positive bowel sounds. Musculoskeletal: warm without cyanosis. There is trace to 1+ lower extremity edema. Skin:  no jaundice or rashes, no wounds   Neurologic: no gross neuro deficits     Psychiatric:  alert and oriented x ppt    CXR:   No new imaging      LAB:  Recent Labs     02/08/22  0422 02/07/22  0501 02/06/22  1954 02/06/22  1846 02/06/22  1846   WBC 10.0 8.9 9.2   < > 9.5   HGB 7.1* 7.6* 7.8*   < > 7.8*   HCT 22.8* 23.7* 24.5*   < > 24.6*    201 187   < > 180   PCT  --   --   --   --  1.06*   LAC  --   --   --   --  2.6*    < > = values in this interval not displayed.      Recent Labs     02/08/22  0422 02/07/22  0501 02/06/22  0636 02/06/22  0409 02/06/22  0409    139 141   < > 142   K 4.0 3.9 3.7   < > 3.6    106 109*   < > 108*   CO2 30 29 26   < > 24   * 185* 195*   < > 157*   BUN 18 11 10   < > 9   CREA 0.80 0.70 0.80   < > 0.70   MG  --   --   --   --  1.9 CA 9.2 9.1 9.0   < > 8.8   ALB 2.5* 2.5*  --   --  2.8*   AST 94* 147*  --   --  27   ALT 29 29  --   --  17   AP 57 64  --   --  66    < > = values in this interval not displayed. Recent Labs     02/06/22  1846 02/06/22  1303 02/06/22  1032 02/06/22  0529   LAC 2.6*  --   --   --    TROPHS  --  11,008.4* 7,394.0* 676.4*   BNPNT 10,504*  --   --   --      Recent Labs     02/06/22  0654   PHI 7.32*   PCO2I 46.3*   PO2I 95   HCO3I 23.8     Recent Labs     02/08/22  0233 02/06/22  1954 02/06/22  1400   CULT NO GROWTH AFTER SHORT PERIOD OF INCUBATION. FURTHER RESULTS TO FOLLOW AFTER OVERNIGHT INCUBATION. NO GROWTH 2 DAYS NO GROWTH 2 DAYS     Assessment and Plan:  (Medical Decision Making)   Principal Problem:    Other fracture of right femur, initial encounter for closed fracture ([address] (2/4/2022)        Active Problems:    COPD, very severe ([address] (8/20/2013)    Known and turned down from transplant. Does not seem to be in exacerbation at present. Acute respiratory failure with hypoxia ([address] (8/22/2013)    Currently on 2L O2. Wean as sats allow. Hyperlipidemia (10/10/2016)          Essential hypertension, benign (10/10/2016)          CAD S/P percutaneous coronary angioplasty (5/31/2019)          Pulmonary mass (2/11/2020)    Infiltrative appearing nodules. Has had similar ones in the past eval with whit bronch and benign. Treating with levaquin and outpatient follow up imaging. Closed right hip fracture ([address] (2/4/2022)          Nonrheumatic mitral valve regurgitation (2/8/2022)          Iron deficiency anemia due to chronic blood loss (2/8/2022)          More than 50% of the time documented was spent in face-to-face contact with the patient and in the care of the patient on the floor/unit where the patient is located.     Ling Herrmann MD

## 2022-02-08 NOTE — PROGRESS NOTES
Diane Mcwilliams MD  Medical Director  [address] [facility], [address]  Tel: [phone]       Physical Medicine & Rehab Consult Progress Note      Patient: Teresa Nath  Admit Date: 2/3/2022  Admit Diagnosis: Other fracture of right femur, initial encounter for closed fracture [facility]) [S72.8X1A]    Recommendations:   Maria R Alejandre 1696, Continue acute rehabilitation program, Coordination of rehab / medical care, Counseling of PM&R care issues management  -Plans for [facility] Wed pending medical clearance. No cp since stents placed. AYALA today. Down to 2L O2 per NC with sats> 92%. Participating and progressing with Therapies  -remains anxious and perseverative which are barriers to progress. Max assist for all mobility today per PT due to anxiety and SOB. History / Subjective / Complaint:     Patient seen and examined, interim EMR reviewed. Sitting on bsc. \"come join the party!  We are getting margaritas\"     Allergies   Allergen Reactions    Promethazine Nausea and Vomiting and Other (comments)     Severe vomiting      Current Facility-Administered Medications   Medication Dose Route Frequency    0.9% sodium chloride infusion 250 mL  250 mL IntraVENous PRN    methIMAzole (TAPAZOLE) tablet 10 mg  10 mg Oral DAILY    albuterol (PROVENTIL HFA, VENTOLIN HFA, PROAIR HFA) inhaler 2 Puff  2 Puff Inhalation Q6H PRN    levoFLOXacin (LEVAQUIN) tablet 750 mg  750 mg Oral Q24H    nitroglycerin (NITROBID) 2 % ointment 1 Inch  1 Inch Topical BID    morphine injection 2 mg  2 mg IntraVENous Q4H PRN    acetaminophen (TYLENOL) tablet 650 mg  650 mg Oral Q8H    lidocaine 4 % patch 1 Patch  1 Patch TransDERmal Q24H    metoprolol tartrate (LOPRESSOR) tablet 50 mg  50 mg Oral Q6H    prasugreL (EFFIENT) tablet 10 mg  10 mg Oral DAILY    methylPREDNISolone (PF) (SOLU-MEDROL) injection 30 mg  30 mg IntraVENous Q8H    albuterol (PROVENTIL VENTOLIN) nebulizer solution 2.5 mg  2.5 mg Nebulization QID RT    albuterol (PROVENTIL VENTOLIN) nebulizer solution 2.5 mg  2.5 mg Nebulization Q4H PRN    albuterol-ipratropium (DUO-NEB) 2.5 MG-0.5 MG/3 ML  3 mL Nebulization Q6H PRN    clonazePAM (KlonoPIN) tablet 0.5 mg  0.5 mg Oral Q6H PRN    guaiFENesin ER (MUCINEX) tablet 1,200 mg  1,200 mg Oral Q12H    budesonide (PULMICORT) 500 mcg/2 ml nebulizer suspension  1,000 mcg Nebulization BID RT    nitroglycerin (NITROSTAT) tablet 0.4 mg  0.4 mg SubLINGual PRN    atorvastatin (LIPITOR) tablet 80 mg  80 mg Oral QHS    aspirin delayed-release tablet 81 mg  81 mg Oral DAILY    [Held by provider] budesonide-formoteroL (SYMBICORT) 160-4.5 mcg/actuation HFA inhaler 2 Puff  2 Puff Inhalation BID RT    pantoprazole (PROTONIX) tablet 40 mg  40 mg Oral BID    tiotropium bromide (SPIRIVA RESPIMAT) 2.5 mcg /actuation  2 Puff Inhalation DAILY    alcohol 62% (NOZIN) nasal  1 Ampule  1 Ampule Topical Q12H    sodium chloride (NS) flush 5-40 mL  5-40 mL IntraVENous PRN    oxyCODONE IR (ROXICODONE) tablet 10 mg  10 mg Oral Q4H PRN    naloxone (NARCAN) injection 0.2-0.4 mg  0.2-0.4 mg IntraVENous Q10MIN PRN    senna-docusate (PERICOLACE) 8.6-50 mg per tablet 2 Tablet  2 Tablet Oral DAILY    ondansetron (ZOFRAN) injection 4 mg  4 mg IntraVENous Q4H PRN       Objective:     Vitals:  Patient Vitals for the past 8 hrs:   BP Temp Pulse Resp SpO2   02/08/22 1358 (!) 107/52 -- -- -- --   02/08/22 1243 -- -- -- -- 98 %   02/08/22 0902 -- -- -- -- 97 %   02/08/22 0717 (!) 96/57 97.1 °F (36.2 °C) 87 20 98 %      Intake and Output:  02/06 1901 - 02/08 0700  In: 440 [P.O.:440]  Out: 2125 [Urine:2125]    Physical Exam:  No significant changes    Incision(s)/Wound(s):   Incision 02/04/22 Leg Right (Active)   Dressing Status Clean;Dry; Intact 02/06/22 0722   Number of days: 4          Pain 1  Pain Scale 1: Numeric (0 - 10) (02/08/22 1042)  Pain Intensity 1: 0 (02/08/22 1042)  Patient Stated Pain Goal: 0 (02/08/22 0845)  Pain Reassessment 1: Yes (02/08/22 0348)  Pain Onset 1: post op (02/08/22 0022)  Pain Location 1: Hip (02/08/22 0022)  Pain Orientation 1: Right (02/08/22 0022)  Pain Description 1: Aching (02/08/22 0022)  Pain Intervention(s) 1: Repositioned (02/08/22 0022)     Functional Assessment:                                                  Labs/Studies:  Recent Results (from the past 67 hour(s))   PLEASE READ & DOCUMENT PPD TEST IN 48 HRS    Collection Time: 02/06/22  3:50 AM   Result Value Ref Range    PPD Negative Negative    mm Induration 0 0 - 5 mm   HEMOGLOBIN    Collection Time: 02/06/22  4:09 AM   Result Value Ref Range    HGB 9.0 (L) 11.7 - 46.4 g/dL   METABOLIC PANEL, COMPREHENSIVE    Collection Time: 02/06/22  4:09 AM   Result Value Ref Range    Sodium 142 136 - 145 mmol/L    Potassium 3.6 3.5 - 5.1 mmol/L    Chloride 108 (H) 98 - 107 mmol/L    CO2 24 21 - 32 mmol/L    Anion gap 10 7 - 16 mmol/L    Glucose 157 (H) 65 - 100 mg/dL    BUN 9 8 - 23 MG/DL    Creatinine 0.70 0.6 - 1.0 MG/DL    GFR est AA >60 >60 ml/min/1.73m2    GFR est non-AA >60 >60 ml/min/1.73m2    Calcium 8.8 8.3 - 10.4 MG/DL    Bilirubin, total 0.4 0.2 - 1.1 MG/DL    ALT (SGPT) 17 12 - 65 U/L    AST (SGOT) 27 15 - 37 U/L    Alk.  phosphatase 66 50 - 136 U/L    Protein, total 6.5 6.3 - 8.2 g/dL    Albumin 2.8 (L) 3.2 - 4.6 g/dL    Globulin 3.7 (H) 2.3 - 3.5 g/dL    A-G Ratio 0.8 (L) 1.2 - 3.5     MAGNESIUM    Collection Time: 02/06/22  4:09 AM   Result Value Ref Range    Magnesium 1.9 1.6 - 2.3 mg/dL   TROPONIN-HIGH SENSITIVITY    Collection Time: 02/06/22  5:29 AM   Result Value Ref Range    Troponin-High Sensitivity 676.4 (HH) 0 - 14 pg/mL   EKG, 12 LEAD, INITIAL    Collection Time: 02/06/22  5:31 AM   Result Value Ref Range    Ventricular Rate 154 BPM    Atrial Rate 154 BPM    P-R Interval 162 ms    QRS Duration 94 ms    Q-T Interval 288 ms    QTC Calculation (Bezet) 461 ms    Calculated P Axis 77 degrees    Calculated R Axis 59 degrees    Calculated T Axis -130 degrees    Diagnosis       Sinus tachycardia  ST depression, consider subendocardial injury  Abnormal ECG    Confirmed by Nicko Wheat ([id number]) on 2/6/2022 8:24:42 AM     CBC W/O DIFF    Collection Time: 02/06/22  6:36 AM   Result Value Ref Range    WBC 12.1 (H) 4.3 - 11.1 K/uL    RBC 3.06 (L) 4.05 - 5.2 M/uL    HGB 9.0 (L) 11.7 - 15.4 g/dL    HCT 28.3 (L) 35.8 - 46.3 %    MCV 92.5 79.6 - 97.8 FL    MCH 29.4 26.1 - 32.9 PG    MCHC 31.8 31.4 - 35.0 g/dL    RDW 14.2 11.9 - 14.6 %    PLATELET 845 417 - 764 K/uL    MPV 9.9 9.4 - 12.3 FL    ABSOLUTE NRBC 0.00 0.0 - 0.2 K/uL   D DIMER    Collection Time: 02/06/22  6:36 AM   Result Value Ref Range    D DIMER 7.02 (H) <0.56 ug/ml(FEU)   METABOLIC PANEL, BASIC    Collection Time: 02/06/22  6:36 AM   Result Value Ref Range    Sodium 141 136 - 145 mmol/L    Potassium 3.7 3.5 - 5.1 mmol/L    Chloride 109 (H) 98 - 107 mmol/L    CO2 26 21 - 32 mmol/L    Anion gap 6 (L) 7 - 16 mmol/L    Glucose 195 (H) 65 - 100 mg/dL    BUN 10 8 - 23 MG/DL    Creatinine 0.80 0.6 - 1.0 MG/DL    GFR est AA >60 >60 ml/min/1.73m2    GFR est non-AA >60 >60 ml/min/1.73m2    Calcium 9.0 8.3 - 10.4 MG/DL   BLOOD GAS, ARTERIAL POC    Collection Time: 02/06/22  6:54 AM   Result Value Ref Range    Device: Non rebreather      FIO2 (POC) 100 %    pH (POC) 7.32 (L) 7.35 - 7.45      pCO2 (POC) 46.3 (H) 35 - 45 MMHG    pO2 (POC) 95 75 - 100 MMHG    HCO3 (POC) 23.8 22 - 26 MMOL/L    sO2 (POC) 96.7 95 - 98 %    Base deficit (POC) 2.4 mmol/L    Allens test (POC) Positive      Total resp.  rate 20      Site RIGHT RADIAL      Specimen type (POC) ARTERIAL      Performed by Davida Anne     Respiratory comment: 15L    EKG, 12 LEAD, SUBSEQUENT    Collection Time: 02/06/22  7:24 AM   Result Value Ref Range    Ventricular Rate 112 BPM    Atrial Rate 112 BPM    P-R Interval 150 ms    QRS Duration 82 ms    Q-T Interval 354 ms    QTC Calculation (Bezet) 483 ms    Calculated P Axis 85 degrees    Calculated R Axis 60 degrees    Calculated T Axis -134 degrees    Diagnosis       Sinus tachycardia  Marked ST abnormality, possible inferior subendocardial injury  Abnormal ECG    Confirmed by Sangeeta Damon ([id number]) on 2/6/2022 8:25:13 AM     ECHO ADULT COMPLETE    Collection Time: 02/06/22  9:02 AM   Result Value Ref Range    LV EDV A2C 66 mL    LV EDV A4C 66 mL    LV EDV BP 66 56 - 104 mL    LV ESV A2C 33 mL    LV ESV A4C 30 mL    IVSd 0.8 0.6 - 0.9 cm    LVIDd 4.3 3.9 - 5.3 cm    LVIDs 3.0 cm    LVOT Diameter 1.9 cm    LVOT Mean Gradient 1 mmHg    LVOT VTI 16.2 cm    LVOT Peak Velocity 0.9 m/s    LVOT Peak Gradient 3 mmHg    LVPWd 1.1 (A) 0.6 - 0.9 cm    LV E' Lateral Velocity 7 cm/s    LV E' Septal Velocity 6 cm/s    LV Ejection Fraction A2C 51 %    LV Ejection Fraction A4C 55 %    EF BP 52 (A) 55 - 100 %    LVOT Area 2.8 cm2    LVOT SV 45.9 ml    LA Minor Axis 5.1 cm    LA Major Cecil 4.3 cm    LA Area 2C 15.6 cm2    LA Area 4C 13.0 cm2    LA Volume BP 37 22 - 52 mL    LA Diameter 2.5 cm    AR .0 ms    AR Max Velocity PISA 3.7 m/s    AV Peak Velocity 1.2 m/s    AV Peak Gradient 6 mmHg    AV Mean Velocity 0.8 m/s    AV Mean Gradient 3 mmHg    AV VTI 21.5 cm    AV Area by VTI 2.1 cm2    AV Area by Peak Velocity 2.0 cm2    Aortic Root 3.0 cm    Ascending Aorta 3.0 cm    MV Nyquist Velocity 37 cm/s    MR Radius PISA 0.40 cm    MR .0 cm    MR Peak Velocity 5.3 m/s    MR Peak Gradient 112 mmHg    MV E Wave Deceleration Time 114.0 ms    MV A Velocity 0.52 m/s    MV E Velocity 1.07 m/s    MV EROA PISA 7.0 cm2    RVIDd 2.0 cm    TAPSE 1.8 1.5 - 2.0 cm    TR Max Velocity 2.38 m/s    TR Peak Gradient 23 mmHg    Fractional Shortening 2D 30 28 - 44 %    LV EDV Index BP 36 mL/m2    LV ESV Index A4C 17 mL/m2    LV EDV Index A4C 36 mL/m2    LV ESV Index A2C 18 mL/m2    LV EDV Index A2C 36 mL/m2    LVIDd Index 2.38 cm/m2    LVIDs Index 1.66 cm/m2    LV RWT Ratio 0.51     LV Mass 2D 132.7 67 - 162 g LV Mass 2D Index 73.3 43 - 95 g/m2    MV E/A 2.06     E/E' Ratio (Averaged) 16.56     E/E' Lateral 15.29     E/E' Septal 17.83     LA Volume Index BP 20 16 - 34 ml/m2    LVOT Stroke Volume Index 25.4 mL/m2    LA Size Index 1.38 cm/m2    LA/AO Root Ratio 0.83     Ao Root Index 1.66 cm/m2    Ascending Aorta Index 1.66 cm/m2    AV Velocity Ratio 0.75     LVOT:AV VTI Index 0.75     CHITO/BSA VTI 1.2 cm2/m2    CHITO/BSA Peak Velocity 1.1 cm2/m2    MR Regurg Volume PISA 1,129.36 mL    Est. RA Pressure 3 mmHg    RVSP 26 mmHg    LA Volume 2C 38 22 - 52 mL    LA Volume Index 2C 21 16 - 34 mL/m2    LA Volume 4C 31 22 - 52 mL    LA Volume Index 4C 17 16 - 34 mL/m2   CBC WITH AUTOMATED DIFF    Collection Time: 02/06/22 10:32 AM   Result Value Ref Range    WBC 10.7 4.3 - 11.1 K/uL    RBC 2.86 (L) 4.05 - 5.2 M/uL    HGB 8.4 (L) 11.7 - 15.4 g/dL    HCT 26.7 (L) 35.8 - 46.3 %    MCV 93.4 79.6 - 97.8 FL    MCH 29.4 26.1 - 32.9 PG    MCHC 31.5 31.4 - 35.0 g/dL    RDW 14.2 11.9 - 14.6 %    PLATELET 648 813 - 954 K/uL    MPV 10.4 9.4 - 12.3 FL    ABSOLUTE NRBC 0.00 0.0 - 0.2 K/uL    DF AUTOMATED      NEUTROPHILS 87 (H) 43 - 78 %    LYMPHOCYTES 4 (L) 13 - 44 %    MONOCYTES 8 4.0 - 12.0 %    EOSINOPHILS 0 (L) 0.5 - 7.8 %    BASOPHILS 0 0.0 - 2.0 %    IMMATURE GRANULOCYTES 1 0.0 - 5.0 %    ABS. NEUTROPHILS 9.3 (H) 1.7 - 8.2 K/UL    ABS. LYMPHOCYTES 0.5 0.5 - 4.6 K/UL    ABS. MONOCYTES 0.8 0.1 - 1.3 K/UL    ABS. EOSINOPHILS 0.0 0.0 - 0.8 K/UL    ABS. BASOPHILS 0.0 0.0 - 0.2 K/UL    ABS. IMM.  GRANS. 0.1 0.0 - 0.5 K/UL   TROPONIN-HIGH SENSITIVITY    Collection Time: 02/06/22 10:32 AM   Result Value Ref Range    Troponin-High Sensitivity 7,394.0 (HH) 0 - 14 pg/mL   TROPONIN-HIGH SENSITIVITY    Collection Time: 02/06/22  1:03 PM   Result Value Ref Range    Troponin-High Sensitivity 11,008.4 (HH) 0 - 14 pg/mL   CULTURE, BLOOD    Collection Time: 02/06/22  2:00 PM    Specimen: Blood   Result Value Ref Range    Special Requests: RIGHT  HAND Culture result: NO GROWTH 2 DAYS     RESPIRATORY VIRUS PANEL W/COVID-19, PCR    Collection Time: 02/06/22  6:44 PM    Specimen: Nasopharyngeal   Result Value Ref Range    Adenovirus NOT DETECTED NOTDET      Coronavirus 229E NOT DETECTED NOTDET      Coronavirus HKU1 NOT DETECTED NOTDET      Coronavirus CVNL63 NOT DETECTED NOTDET      Coronavirus OC43 NOT DETECTED NOTDET      SARS-CoV-2, PCR NOT DETECTED NOTDET      Metapneumovirus NOT DETECTED NOTDET      Rhinovirus and Enterovirus NOT DETECTED NOTDET      Influenza A NOT DETECTED NOTDET      Influenza B NOT DETECTED NOTDET      Parainfluenza 1 NOT DETECTED NOTDET      Parainfluenza 2 NOT DETECTED NOTDET      Parainfluenza 3 NOT DETECTED NOTDET      Parainfluenza virus 4 NOT DETECTED NOTDET      RSV by PCR NOT DETECTED NOTDET      B. parapertussis, PCR NOT DETECTED NOTDET      Bordetella pertussis - PCR NOT DETECTED NOTDET      Chlamydophila pneumoniae DNA, QL, PCR NOT DETECTED NOTDET      Mycoplasma pneumoniae DNA, QL, PCR NOT DETECTED NOTDET     NT-PRO BNP    Collection Time: 02/06/22  6:46 PM   Result Value Ref Range    NT pro-BNP 10,504 (H) 5 - 125 PG/ML   LACTIC ACID    Collection Time: 02/06/22  6:46 PM   Result Value Ref Range    Lactic acid 2.6 (H) 0.4 - 2.0 MMOL/L   PROCALCITONIN    Collection Time: 02/06/22  6:46 PM   Result Value Ref Range    Procalcitonin 1.06 (H) 0.00 - 0.49 ng/mL   PTT    Collection Time: 02/06/22  6:46 PM   Result Value Ref Range    aPTT 28.8 24.1 - 35.1 SEC   CBC WITH AUTOMATED DIFF    Collection Time: 02/06/22  6:46 PM   Result Value Ref Range    WBC 9.5 4.3 - 11.1 K/uL    RBC 2.65 (L) 4.05 - 5.2 M/uL    HGB 7.8 (L) 11.7 - 15.4 g/dL    HCT 24.6 (L) 35.8 - 46.3 %    MCV 92.8 79.6 - 97.8 FL    MCH 29.4 26.1 - 32.9 PG    MCHC 31.7 31.4 - 35.0 g/dL    RDW 14.2 11.9 - 14.6 %    PLATELET 473 497 - 621 K/uL    MPV 9.8 9.4 - 12.3 FL    ABSOLUTE NRBC 0.00 0.0 - 0.2 K/uL    DF AUTOMATED      NEUTROPHILS 93 (H) 43 - 78 %    LYMPHOCYTES 2 (L) 13 - 44 %    MONOCYTES 3 (L) 4.0 - 12.0 %    EOSINOPHILS 2 0.5 - 7.8 %    BASOPHILS 0 0.0 - 2.0 %    IMMATURE GRANULOCYTES 0 0.0 - 5.0 %    ABS. NEUTROPHILS 8.8 (H) 1.7 - 8.2 K/UL    ABS. LYMPHOCYTES 0.2 (L) 0.5 - 4.6 K/UL    ABS. MONOCYTES 0.3 0.1 - 1.3 K/UL    ABS. EOSINOPHILS 0.2 0.0 - 0.8 K/UL    ABS. BASOPHILS 0.0 0.0 - 0.2 K/UL    ABS. IMM. GRANS. 0.0 0.0 - 0.5 K/UL   CULTURE, BLOOD    Collection Time: 02/06/22  7:54 PM    Specimen: Blood   Result Value Ref Range    Special Requests: LEFT PICC LINE      Culture result: NO GROWTH 2 DAYS     CBC WITH AUTOMATED DIFF    Collection Time: 02/06/22  7:54 PM   Result Value Ref Range    WBC 9.2 4.3 - 11.1 K/uL    RBC 2.66 (L) 4.05 - 5.2 M/uL    HGB 7.8 (L) 11.7 - 15.4 g/dL    HCT 24.5 (L) 35.8 - 46.3 %    MCV 92.1 79.6 - 97.8 FL    MCH 29.3 26.1 - 32.9 PG    MCHC 31.8 31.4 - 35.0 g/dL    RDW 14.3 11.9 - 14.6 %    PLATELET 051 612 - 245 K/uL    MPV 10.0 9.4 - 12.3 FL    ABSOLUTE NRBC 0.00 0.0 - 0.2 K/uL    DF AUTOMATED      NEUTROPHILS 92 (H) 43 - 78 %    LYMPHOCYTES 3 (L) 13 - 44 %    MONOCYTES 3 (L) 4.0 - 12.0 %    EOSINOPHILS 2 0.5 - 7.8 %    BASOPHILS 0 0.0 - 2.0 %    IMMATURE GRANULOCYTES 0 0.0 - 5.0 %    ABS. NEUTROPHILS 8.5 (H) 1.7 - 8.2 K/UL    ABS. LYMPHOCYTES 0.3 (L) 0.5 - 4.6 K/UL    ABS. MONOCYTES 0.3 0.1 - 1.3 K/UL    ABS. EOSINOPHILS 0.2 0.0 - 0.8 K/UL    ABS. BASOPHILS 0.0 0.0 - 0.2 K/UL    ABS. IMM.  GRANS. 0.0 0.0 - 0.5 K/UL   PLEASE READ & DOCUMENT PPD TEST IN 72 HRS    Collection Time: 02/07/22  3:10 AM   Result Value Ref Range    PPD Negative Negative    mm Induration 0 0 - 5 mm   METABOLIC PANEL, COMPREHENSIVE    Collection Time: 02/07/22  5:01 AM   Result Value Ref Range    Sodium 139 136 - 145 mmol/L    Potassium 3.9 3.5 - 5.1 mmol/L    Chloride 106 98 - 107 mmol/L    CO2 29 21 - 32 mmol/L    Anion gap 4 (L) 7 - 16 mmol/L    Glucose 185 (H) 65 - 100 mg/dL    BUN 11 8 - 23 MG/DL    Creatinine 0.70 0.6 - 1.0 MG/DL    GFR est AA >60 >60 ml/min/1.73m2 GFR est non-AA >60 >60 ml/min/1.73m2    Calcium 9.1 8.3 - 10.4 MG/DL    Bilirubin, total 0.3 0.2 - 1.1 MG/DL    ALT (SGPT) 29 12 - 65 U/L    AST (SGOT) 147 (H) 15 - 37 U/L    Alk. phosphatase 64 50 - 136 U/L    Protein, total 6.2 (L) 6.3 - 8.2 g/dL    Albumin 2.5 (L) 3.2 - 4.6 g/dL    Globulin 3.7 (H) 2.3 - 3.5 g/dL    A-G Ratio 0.7 (L) 1.2 - 3.5     CBC WITH AUTOMATED DIFF    Collection Time: 02/07/22  5:01 AM   Result Value Ref Range    WBC 8.9 4.3 - 11.1 K/uL    RBC 2.60 (L) 4.05 - 5.2 M/uL    HGB 7.6 (L) 11.7 - 15.4 g/dL    HCT 23.7 (L) 35.8 - 46.3 %    MCV 91.2 79.6 - 97.8 FL    MCH 29.2 26.1 - 32.9 PG    MCHC 32.1 31.4 - 35.0 g/dL    RDW 14.3 11.9 - 14.6 %    PLATELET 263 526 - 813 K/uL    MPV 10.4 9.4 - 12.3 FL    ABSOLUTE NRBC 0.00 0.0 - 0.2 K/uL    DF AUTOMATED      NEUTROPHILS 90 (H) 43 - 78 %    LYMPHOCYTES 3 (L) 13 - 44 %    MONOCYTES 4 4.0 - 12.0 %    EOSINOPHILS 3 0.5 - 7.8 %    BASOPHILS 0 0.0 - 2.0 %    IMMATURE GRANULOCYTES 0 0.0 - 5.0 %    ABS. NEUTROPHILS 7.9 1.7 - 8.2 K/UL    ABS. LYMPHOCYTES 0.3 (L) 0.5 - 4.6 K/UL    ABS. MONOCYTES 0.4 0.1 - 1.3 K/UL    ABS. EOSINOPHILS 0.3 0.0 - 0.8 K/UL    ABS. BASOPHILS 0.0 0.0 - 0.2 K/UL    ABS. IMM.  GRANS. 0.0 0.0 - 0.5 K/UL   HEPARIN XA UFH    Collection Time: 02/07/22  5:01 AM   Result Value Ref Range    Heparin Xa UFH 0.51 0.3 - 0.7 IU/mL   POC ACTIVATED CLOTTING TIME    Collection Time: 02/07/22 10:40 AM   Result Value Ref Range    Activated Clotting Time (POC) 279 (H) 70 - 128 SECS   POC ACTIVATED CLOTTING TIME    Collection Time: 02/07/22 11:57 AM   Result Value Ref Range    Activated Clotting Time (POC) 267 (H) 70 - 128 SECS   TSH 3RD GENERATION    Collection Time: 02/07/22  5:07 PM   Result Value Ref Range    TSH 0.101 (L) 0.358 - 3.740 uIU/mL   T4, FREE    Collection Time: 02/07/22  5:07 PM   Result Value Ref Range    T4, Free 2.4 (H) 0.78 - 1.46 NG/DL   VITAMIN D, 25 HYDROXY    Collection Time: 02/07/22  5:07 PM   Result Value Ref Range    Vitamin D 25-Hydroxy 30.1 30.0 - 100.0 ng/mL   CULTURE, RESPIRATORY/SPUTUM/BRONCH W GRAM STAIN    Collection Time: 02/08/22  2:33 AM    Specimen: Sputum   Result Value Ref Range    Special Requests: NO SPECIAL REQUESTS      GRAM STAIN 0 TO 3 WBCS PER OIF     GRAM STAIN 0 TO 1  EPITHELIAL CELLS SEEN     GRAM STAIN MODERATE GRAM POSITIVE COCCI      GRAM STAIN 4+ MUCUS PRESENT      Culture result:        NO GROWTH AFTER SHORT PERIOD OF INCUBATION. FURTHER RESULTS TO FOLLOW AFTER OVERNIGHT INCUBATION. METABOLIC PANEL, BASIC    Collection Time: 02/08/22  4:22 AM   Result Value Ref Range    Sodium 140 136 - 145 mmol/L    Potassium 4.0 3.5 - 5.1 mmol/L    Chloride 105 98 - 107 mmol/L    CO2 30 21 - 32 mmol/L    Anion gap 5 (L) 7 - 16 mmol/L    Glucose 152 (H) 65 - 100 mg/dL    BUN 18 8 - 23 MG/DL    Creatinine 0.80 0.6 - 1.0 MG/DL    GFR est AA >60 >60 ml/min/1.73m2    GFR est non-AA >60 >60 ml/min/1.73m2    Calcium 9.2 8.3 - 10.4 MG/DL   CBC W/O DIFF    Collection Time: 02/08/22  4:22 AM   Result Value Ref Range    WBC 10.0 4.3 - 11.1 K/uL    RBC 2.45 (L) 4.05 - 5.2 M/uL    HGB 7.1 (L) 11.7 - 15.4 g/dL    HCT 22.8 (L) 35.8 - 46.3 %    MCV 93.1 79.6 - 97.8 FL    MCH 29.0 26.1 - 32.9 PG    MCHC 31.1 (L) 31.4 - 35.0 g/dL    RDW 14.4 11.9 - 14.6 %    PLATELET 758 797 - 155 K/uL    MPV 10.0 9.4 - 12.3 FL    ABSOLUTE NRBC 0.00 0.0 - 0.2 K/uL   HEPATIC FUNCTION PANEL    Collection Time: 02/08/22  4:22 AM   Result Value Ref Range    Protein, total 6.0 (L) 6.3 - 8.2 g/dL    Albumin 2.5 (L) 3.2 - 4.6 g/dL    Globulin 3.5 2.3 - 3.5 g/dL    A-G Ratio 0.7 (L) 1.2 - 3.5      Bilirubin, total 0.3 0.2 - 1.1 MG/DL    Bilirubin, direct <0.1 <0.4 MG/DL    Alk. phosphatase 57 50 - 136 U/L    AST (SGOT) 94 (H) 15 - 37 U/L    ALT (SGPT) 29 12 - 65 U/L   RBC, ALLOCATE    Collection Time: 02/08/22  7:45 AM   Result Value Ref Range    HISTORY CHECKED?  Historical check performed    TYPE & SCREEN    Collection Time: 02/08/22 11:36 AM   Result Value Ref Range    Crossmatch Expiration 02/11/2022,2359     ABO/Rh(D) A NEGATIVE     Antibody screen NEG         Assessment:     Principal Problem:    Other fracture of right femur, initial encounter for closed fracture ([address] (2/4/2022)    Active Problems:    COPD, very severe ([address] (8/20/2013)      Acute respiratory failure with hypoxia ([address] (8/22/2013)      Hyperlipidemia (10/10/2016)      Essential hypertension, benign (10/10/2016)      CAD S/P percutaneous coronary angioplasty (5/31/2019)      Pulmonary mass (2/11/2020)      Closed right hip fracture ([facility] (2/4/2022)      Nonrheumatic mitral valve regurgitation (2/8/2022)      Iron deficiency anemia due to chronic blood loss (2/8/2022)        Plan / Recommendations / Medical Decision Making:     Recommendations:   Continue acute rehabilitation program  Coordination of rehab / medical care  Counseling of PM&R care issues management  Monitoring and management of medical conditions per plan of care / orders    Discussion with Family / Caregiver / Staff    Reviewed Flori Costa / Kaiden Culp / Medications / Records

## 2022-02-08 NOTE — PROGRESS NOTES
ACUTE PHYSICAL THERAPY GOALS:  (Developed with and agreed upon by patient and/or caregiver.)  1. Pt will perform bed mobility Mod (I) c use of rails PRN in 7 therapy sessions. 2. Pt will perform sit-to-stand/ stand-to-sit transfers Mod (I) c use of LRAD in 7 therapy sessions. 3. Pt will ambulate 100 ft under (S) with use of LRAD and breaks as needed in 7 therapy sessions. 4. Pt will perform standing balance activities with minimal postural sway in 7 therapy sessions. 5. Pt will tolerate multiple sets and reps of BLE exercises in 7 therapy sessions. PHYSICAL THERAPY: Daily Note and PM Treatment Day # 2     WBAT ISATU More is a 66 y.o. female   PRIMARY DIAGNOSIS: Other fracture of right femur, initial encounter for closed fracture ([facility] [address]  Other fracture of right femur, initial encounter for closed fracture ([facility] [address] [S72.8X1A]  Procedure(s) (LRB):  LEFT HEART CATH / CORONARY ANGIOGRAPHY (N/A)  PERCUTANEOUS CORONARY INTERVENTION (N/A)  1 Day Post-Op    ASSESSMENT:     REHAB RECOMMENDATIONS: CURRENT LEVEL OF FUNCTION:  (Most Recently Demonstrated)   Recommendation to date pending progress:  [address] [place] [address]  Equipment:    Rolling Walker Bed Mobility:   Moderate Assistance x 2  Sit to Stand:   Minimal Assistance  Transfers:   Minimal Assistance x 2  Gait/Mobility:   Minimal Assistance x 2     ASSESSMENT:  Ms. Max Elder has just gotten settled back in bed upon contact, but is agreeable to supine exercises. Max additional time for exercises with cueing and assist for almost all mobility, including initiation on the non-surgical side. Good command following and participation, but limited by pain and the fear of pain. Slow progress toward goals.         SUBJECTIVE:   Ms. Max Elder states, \"It wasn't too bad\"    SOCIAL HISTORY/ LIVING ENVIRONMENT: See Eval  Home Environment: Private residence  One/Two Story Residence: One story  Living Alone: No  Support Systems: Spouse/Significant Other,Child(raghavendra),Friend/Neighbor  OBJECTIVE:     PAIN: VITAL SIGNS: LINES/DRAINS:   Pre Treatment: Pain Screen  Pain Scale 1: Numeric (0 - 10)  Pain Intensity 1: 0  Post Treatment: 0      O2 Device: Nasal cannula     MOBILITY: I Mod I S SBA CGA Min Mod Max Total  NT x2 Comments:   Bed Mobility    Rolling [] [] [] [] [] [] [] [] [] [x] []    Supine to Sit [] [] [] [] [] [] [] [] [] [x] []    Scooting [] [] [] [] [] [] [] [] [] [x] []    Sit to Supine [] [] [] [] [] [] [] [] [] [x] []    Transfers    Sit to Stand [] [] [] [] [] [] [] [] [] [x] []    Bed to Chair [] [] [] [] [] [] [] [] [] [x] []    Stand to Sit [] [] [] [] [] [] [] [] [] [x] []    I=Independent, Mod I=Modified Independent, S=Supervision, SBA=Standby Assistance, CGA=Contact Guard Assistance,   Min=Minimal Assistance, Mod=Moderate Assistance, Max=Maximal Assistance, Total=Total Assistance, NT=Not Tested    BALANCE: Good Fair+ Fair Fair- Poor NT Comments   Sitting Static [] [] [] [] [] [x]    Sitting Dynamic [] [] [] [] [] [x]              Standing Static [] [] [] [] [] [x]    Standing Dynamic [] [] [] [] [] [x]      GAIT: I Mod I S SBA CGA Min Mod Max Total  NT x2 Comments:   Level of Assistance [] [] [] [] [] [] [] [] [] [x] []    Distance     DME Rolling Walker    Gait Quality Heel-toe-pivot; slow     Weightbearing  Status WBAT, RLE     I=Independent, Mod I=Modified Independent, S=Supervision, SBA=Standby Assistance, CGA=Contact Guard Assistance,   Min=Minimal Assistance, Mod=Moderate Assistance, Max=Maximal Assistance, Total=Total Assistance, NT=Not Tested    PLAN:   FREQUENCY/DURATION: PT Plan of Care: BID for duration of hospital stay or until stated goals are met, whichever comes first.  TREATMENT:     TREATMENT:   ($$ Gait Trainin-22 mins  $$ Therapeutic Activity: 38-52 mins  $$ Therapeutic Exercises: 53-67 mins    )  Therapeutic Exercise (54 Minutes):  Therapeutic exercises noted below to improve functional activity tolerance, AROM, strength, mobility and anxiety.      TREATMENT GRID:   Date:  2/8 Date:   Date:     Activity/Exercise Parameters Parameters Parameters   Ankle pumps x10 B     Heel slides x10 AAB     abduction x10 AAB     Glut sets x10     Quad sets x10     SLR x10 AAB                 AFTER TREATMENT POSITION/PRECAUTIONS:  Bed, Needs within reach and RN notified    INTERDISCIPLINARY COLLABORATION:  RN/PCT and PT/PTA    TOTAL TREATMENT DURATION:  PT Patient Time In/Time Out  Time In: 1407  Time Out: [address] [facility],  [id number], PTA

## 2022-02-08 NOTE — ROUTINE PROCESS
Cardiac Rehab: Spoke with patient regarding referral to cardiac rehab. Patient meets admission criteria based on NSTEMI with PCI  (2/7/22). Written information about Cardiac Rehab given and reviewed with patient. Discussed lifestyle modifications to promote cardiac wellness. Pt states she has participated and enjoyed Cardiac Rehab in the past. Patient indicated that she may want to participate in the cardiac rehab program when appropriate. She is currently recovering from a hip fracture repair. She states she will call to schedule orientation when appropriate. Her Cardiologist is Dr. Tosin Bullock.       Thank you,  APRYL GleasonN, RN  Cardiopulmonary Rehabilitation Nurse Liaison  [facility]

## 2022-02-08 NOTE — PROGRESS NOTES
ACUTE PHYSICAL THERAPY GOALS:  (Developed with and agreed upon by patient and/or caregiver.)  1. Pt will perform bed mobility Mod (I) c use of rails PRN in 7 therapy sessions. 2. Pt will perform sit-to-stand/ stand-to-sit transfers Mod (I) c use of LRAD in 7 therapy sessions. 3. Pt will ambulate 100 ft under (S) with use of LRAD and breaks as needed in 7 therapy sessions. 4. Pt will perform standing balance activities with minimal postural sway in 7 therapy sessions. 5. Pt will tolerate multiple sets and reps of BLE exercises in 7 therapy sessions. PHYSICAL THERAPY: Daily Note and AM Treatment Day # 2     WBAT ISATU Dominguez is a 66 y.o. female   PRIMARY DIAGNOSIS: Other fracture of right femur, initial encounter for closed fracture ([facility] [address]  Other fracture of right femur, initial encounter for closed fracture ([facility] [address] [S72.8X1A]  Procedure(s) (LRB):  LEFT HEART CATH / CORONARY ANGIOGRAPHY (N/A)  PERCUTANEOUS CORONARY INTERVENTION (N/A)  1 Day Post-Op    ASSESSMENT:     REHAB RECOMMENDATIONS: CURRENT LEVEL OF FUNCTION:  (Most Recently Demonstrated)   Recommendation to date pending progress:  [address] [place] [address]  Equipment:    Rolling Walker Bed Mobility:   Moderate Assistance x 2  Sit to Stand:   Minimal Assistance  Transfers:   Minimal Assistance x 2  Gait/Mobility:   Minimal Assistance x 2     ASSESSMENT:  Ms. Aubrey Thomas is making slow progress toward goals. Today she required max additional time for all mobility due to increased anxiety and SOB. Bed mobility with mod Ax2 for mobilizing LE and trunk. Improved once EOB with good sitting balance and STS/SPT to the [facility] and then to the recliner. Gait is slow and patient is continuing to use pivoting technique instead of taking true steps despite cueing and instruction. Comfortable in the recliner post treatment with needs in reach.         SUBJECTIVE:   Ms. Aubrey Thomas states, \"You all did such a good job\"    SOCIAL HISTORY/ LIVING ENVIRONMENT: See Eval  Home Environment: Private residence  One/Two Story Residence: One story  Living Alone: No  Support Systems: Spouse/Significant Other,Child(raghavendra),Friend/Neighbor  OBJECTIVE:     PAIN: VITAL SIGNS: LINES/DRAINS:   Pre Treatment: Pain Screen  Pain Scale 1: Numeric (0 - 10)  Pain Intensity 1: 0 (att rest)  Post Treatment: 0      O2 Device: Nasal cannula     MOBILITY: I Mod I S SBA CGA Min Mod Max Total  NT x2 Comments:   Bed Mobility    Rolling [] [] [] [] [] [] [] [] [] [x] []    Supine to Sit [] [] [] [] [] [] [x] [] [] [] [x]    Scooting [] [] [] [] [] [x] [] [] [] [] [x]    Sit to Supine [] [] [] [] [] [] [] [] [] [x] []    Transfers    Sit to Stand [] [] [] [] [] [x] [] [] [] [] [x]    Bed to Chair [] [] [] [] [] [x] [] [] [] [] [x]    Stand to Sit [] [] [] [] [] [x] [] [] [] [] [x]    I=Independent, Mod I=Modified Independent, S=Supervision, SBA=Standby Assistance, CGA=Contact Guard Assistance,   Min=Minimal Assistance, Mod=Moderate Assistance, Max=Maximal Assistance, Total=Total Assistance, NT=Not Tested    BALANCE: Good Fair+ Fair Fair- Poor NT Comments   Sitting Static [] [x] [] [] [] []    Sitting Dynamic [] [] [x] [] [] []              Standing Static [] [] [x] [] [] []    Standing Dynamic [] [] [x] [] [] []      GAIT: I Mod I S SBA CGA Min Mod Max Total  NT x2 Comments:   Level of Assistance [] [] [] [] [] [x] [] [] [] [] [x]    Distance 3-4 steps x2    DME Rolling Walker    Gait Quality Heel-toe-pivot; slow     Weightbearing  Status WBAT, RLE     I=Independent, Mod I=Modified Independent, S=Supervision, SBA=Standby Assistance, CGA=Contact Guard Assistance,   Min=Minimal Assistance, Mod=Moderate Assistance, Max=Maximal Assistance, Total=Total Assistance, NT=Not Tested    PLAN:   FREQUENCY/DURATION: PT Plan of Care: BID for duration of hospital stay or until stated goals are met, whichever comes first.  TREATMENT:     TREATMENT:   ($$ Gait Trainin-22 mins  $$ Therapeutic Activity: 38-52 mins    )  Co-Treatment PT/OT necessary due to patient's decreased overall endurance/tolerance levels, as well as need for high level skilled assistance to complete functional transfers/mobility and functional tasks  Therapeutic Activity (50 Minutes): Therapeutic activity included Supine to Sit, Scooting, Transfer Training, Sitting balance , Standing balance and sitting tolerance to improve functional Mobility, Strength and Activity tolerance. Gait Training (17 Minutes): Gait training for 5'x2 feet utilizing [address] [facility]. Patient required Manual, Tactile and Verbal cueing to improve Activity Pacing, Assistive Device Utilization, Dynamic Standing Balance and Gait Mechanics.      TREATMENT GRID:  N/A    AFTER TREATMENT POSITION/PRECAUTIONS:  Alarm Activated, Chair, Needs within reach and RN notified    INTERDISCIPLINARY COLLABORATION:  RN/PCT, PT/PTA and OT/YOUNG    TOTAL TREATMENT DURATION:  PT Patient Time In/Time Out  Time In: 1015  Time Out: 60 [facility], [facility]

## 2022-02-09 ENCOUNTER — APPOINTMENT (OUTPATIENT)
Dept: GENERAL RADIOLOGY | Age: 72
DRG: 480 | End: 2022-02-09
Attending: INTERNAL MEDICINE
Payer: MEDICARE

## 2022-02-09 LAB
ABO + RH BLD: NORMAL
ANION GAP SERPL CALC-SCNC: 6 MMOL/L (ref 7–16)
ANTIGENS PRESENT RBC DONR: NORMAL
ATRIAL RATE: 97 BPM
BASOPHILS # BLD: 0 K/UL (ref 0–0.2)
BASOPHILS NFR BLD: 0 % (ref 0–2)
BLD PROD TYP BPU: NORMAL
BLOOD GROUP ANTIBODIES SERPL: NORMAL
BNP SERPL-MCNC: ABNORMAL PG/ML (ref 5–125)
BPU ID: NORMAL
BUN SERPL-MCNC: 19 MG/DL (ref 8–23)
CALCIUM SERPL-MCNC: 9.4 MG/DL (ref 8.3–10.4)
CALCULATED P AXIS, ECG09: 86 DEGREES
CALCULATED R AXIS, ECG10: 69 DEGREES
CALCULATED T AXIS, ECG11: 92 DEGREES
CHLORIDE SERPL-SCNC: 103 MMOL/L (ref 98–107)
CO2 SERPL-SCNC: 28 MMOL/L (ref 21–32)
CREAT SERPL-MCNC: 0.8 MG/DL (ref 0.6–1)
CROSSMATCH RESULT,%XM: NORMAL
DIAGNOSIS, 93000: NORMAL
DIFFERENTIAL METHOD BLD: ABNORMAL
EOSINOPHIL # BLD: 0 K/UL (ref 0–0.8)
EOSINOPHIL NFR BLD: 0 % (ref 0.5–7.8)
ERYTHROCYTE [DISTWIDTH] IN BLOOD BY AUTOMATED COUNT: 15 % (ref 11.9–14.6)
FERRITIN SERPL-MCNC: 228 NG/ML (ref 8–388)
FOLATE SERPL-MCNC: 16.2 NG/ML (ref 3.1–17.5)
GLUCOSE SERPL-MCNC: 182 MG/DL (ref 65–100)
HCT VFR BLD AUTO: 28.1 % (ref 35.8–46.3)
HGB BLD-MCNC: 9.2 G/DL (ref 11.7–15.4)
IMM GRANULOCYTES # BLD AUTO: 0.1 K/UL (ref 0–0.5)
IMM GRANULOCYTES NFR BLD AUTO: 1 % (ref 0–5)
IRON SATN MFR SERPL: 21 %
IRON SERPL-MCNC: 49 UG/DL (ref 35–150)
LYMPHOCYTES # BLD: 0.5 K/UL (ref 0.5–4.6)
LYMPHOCYTES NFR BLD: 5 % (ref 13–44)
MAGNESIUM SERPL-MCNC: 2.1 MG/DL (ref 1.6–2.3)
MCH RBC QN AUTO: 29.4 PG (ref 26.1–32.9)
MCHC RBC AUTO-ENTMCNC: 32.7 G/DL (ref 31.4–35)
MCV RBC AUTO: 89.8 FL (ref 79.6–97.8)
MONOCYTES # BLD: 0.5 K/UL (ref 0.1–1.3)
MONOCYTES NFR BLD: 5 % (ref 4–12)
NEUTS SEG # BLD: 8.9 K/UL (ref 1.7–8.2)
NEUTS SEG NFR BLD: 89 % (ref 43–78)
NRBC # BLD: 0.04 K/UL (ref 0–0.2)
P-R INTERVAL, ECG05: 150 MS
PLATELET # BLD AUTO: 242 K/UL (ref 150–450)
PMV BLD AUTO: 10.1 FL (ref 9.4–12.3)
POTASSIUM SERPL-SCNC: 4 MMOL/L (ref 3.5–5.1)
Q-T INTERVAL, ECG07: 348 MS
QRS DURATION, ECG06: 78 MS
QTC CALCULATION (BEZET), ECG08: 441 MS
RBC # BLD AUTO: 3.13 M/UL (ref 4.05–5.2)
SODIUM SERPL-SCNC: 137 MMOL/L (ref 136–145)
SPECIMEN EXP DATE BLD: NORMAL
STATUS OF UNIT,%ST: NORMAL
TIBC SERPL-MCNC: 235 UG/DL (ref 250–450)
TROPONIN-HIGH SENSITIVITY: 3869.5 PG/ML (ref 0–14)
UNIT DIVISION, %UDIV: 0
VENTRICULAR RATE, ECG03: 97 BPM
VIT B12 SERPL-MCNC: 663 PG/ML (ref 193–986)
WBC # BLD AUTO: 9.9 K/UL (ref 4.3–11.1)

## 2022-02-09 PROCEDURE — 74011000250 HC RX REV CODE- 250: Performed by: INTERNAL MEDICINE

## 2022-02-09 PROCEDURE — 74011250636 HC RX REV CODE- 250/636: Performed by: INTERNAL MEDICINE

## 2022-02-09 PROCEDURE — 74011250637 HC RX REV CODE- 250/637: Performed by: INTERNAL MEDICINE

## 2022-02-09 PROCEDURE — 36592 COLLECT BLOOD FROM PICC: CPT

## 2022-02-09 PROCEDURE — 84484 ASSAY OF TROPONIN QUANT: CPT

## 2022-02-09 PROCEDURE — 94640 AIRWAY INHALATION TREATMENT: CPT

## 2022-02-09 PROCEDURE — 74011250637 HC RX REV CODE- 250/637: Performed by: NURSE PRACTITIONER

## 2022-02-09 PROCEDURE — 97110 THERAPEUTIC EXERCISES: CPT

## 2022-02-09 PROCEDURE — 93005 ELECTROCARDIOGRAM TRACING: CPT | Performed by: INTERNAL MEDICINE

## 2022-02-09 PROCEDURE — 2709999900 HC NON-CHARGEABLE SUPPLY

## 2022-02-09 PROCEDURE — 97530 THERAPEUTIC ACTIVITIES: CPT

## 2022-02-09 PROCEDURE — 71045 X-RAY EXAM CHEST 1 VIEW: CPT

## 2022-02-09 PROCEDURE — 65660000000 HC RM CCU STEPDOWN

## 2022-02-09 PROCEDURE — 85025 COMPLETE CBC W/AUTO DIFF WBC: CPT

## 2022-02-09 PROCEDURE — 77010033678 HC OXYGEN DAILY

## 2022-02-09 PROCEDURE — 74011636637 HC RX REV CODE- 636/637: Performed by: INTERNAL MEDICINE

## 2022-02-09 PROCEDURE — 94760 N-INVAS EAR/PLS OXIMETRY 1: CPT

## 2022-02-09 PROCEDURE — 80048 BASIC METABOLIC PNL TOTAL CA: CPT

## 2022-02-09 PROCEDURE — 99231 SBSQ HOSP IP/OBS SF/LOW 25: CPT | Performed by: PHYSICAL MEDICINE & REHABILITATION

## 2022-02-09 PROCEDURE — 99232 SBSQ HOSP IP/OBS MODERATE 35: CPT | Performed by: INTERNAL MEDICINE

## 2022-02-09 PROCEDURE — 83880 ASSAY OF NATRIURETIC PEPTIDE: CPT

## 2022-02-09 RX ORDER — HYDRALAZINE HYDROCHLORIDE 20 MG/ML
20 INJECTION INTRAMUSCULAR; INTRAVENOUS
Status: DISCONTINUED | OUTPATIENT
Start: 2022-02-09 | End: 2022-02-09

## 2022-02-09 RX ORDER — LORAZEPAM 0.5 MG/1
0.5 TABLET ORAL ONCE
Status: COMPLETED | OUTPATIENT
Start: 2022-02-09 | End: 2022-02-09

## 2022-02-09 RX ORDER — LORAZEPAM 0.5 MG/1
0.5 TABLET ORAL
Status: DISCONTINUED | OUTPATIENT
Start: 2022-02-09 | End: 2022-02-10 | Stop reason: HOSPADM

## 2022-02-09 RX ORDER — LABETALOL HYDROCHLORIDE 5 MG/ML
20 INJECTION, SOLUTION INTRAVENOUS
Status: DISCONTINUED | OUTPATIENT
Start: 2022-02-09 | End: 2022-02-10 | Stop reason: HOSPADM

## 2022-02-09 RX ORDER — FUROSEMIDE 10 MG/ML
40 INJECTION INTRAMUSCULAR; INTRAVENOUS 2 TIMES DAILY
Status: DISCONTINUED | OUTPATIENT
Start: 2022-02-09 | End: 2022-02-09

## 2022-02-09 RX ORDER — LISINOPRIL 5 MG/1
10 TABLET ORAL DAILY
Status: DISCONTINUED | OUTPATIENT
Start: 2022-02-09 | End: 2022-02-10 | Stop reason: HOSPADM

## 2022-02-09 RX ORDER — PREDNISONE 20 MG/1
40 TABLET ORAL
Status: DISCONTINUED | OUTPATIENT
Start: 2022-02-09 | End: 2022-02-10 | Stop reason: HOSPADM

## 2022-02-09 RX ADMIN — ACETAMINOPHEN 650 MG: 325 TABLET ORAL at 13:50

## 2022-02-09 RX ADMIN — ONDANSETRON 4 MG: 2 INJECTION INTRAMUSCULAR; INTRAVENOUS at 03:42

## 2022-02-09 RX ADMIN — NITROGLYCERIN 1 INCH: 20 OINTMENT TOPICAL at 05:30

## 2022-02-09 RX ADMIN — OXYCODONE HYDROCHLORIDE 10 MG: 5 TABLET ORAL at 08:05

## 2022-02-09 RX ADMIN — LORAZEPAM 0.5 MG: 0.5 TABLET ORAL at 21:33

## 2022-02-09 RX ADMIN — METOPROLOL TARTRATE 50 MG: 50 TABLET, FILM COATED ORAL at 17:01

## 2022-02-09 RX ADMIN — METOPROLOL TARTRATE 50 MG: 50 TABLET, FILM COATED ORAL at 23:33

## 2022-02-09 RX ADMIN — HEPARIN SODIUM 5000 UNITS: 5000 INJECTION INTRAVENOUS; SUBCUTANEOUS at 08:06

## 2022-02-09 RX ADMIN — FUROSEMIDE 40 MG: 10 INJECTION, SOLUTION INTRAMUSCULAR; INTRAVENOUS at 14:55

## 2022-02-09 RX ADMIN — BUDESONIDE 500 MCG: 0.5 INHALANT RESPIRATORY (INHALATION) at 23:22

## 2022-02-09 RX ADMIN — GUAIFENESIN 1200 MG: 600 TABLET ORAL at 21:30

## 2022-02-09 RX ADMIN — ACETAMINOPHEN 650 MG: 325 TABLET ORAL at 21:30

## 2022-02-09 RX ADMIN — LISINOPRIL 10 MG: 5 TABLET ORAL at 12:12

## 2022-02-09 RX ADMIN — IPRATROPIUM BROMIDE AND ALBUTEROL SULFATE 3 ML: .5; 3 SOLUTION RESPIRATORY (INHALATION) at 02:02

## 2022-02-09 RX ADMIN — METHYLPREDNISOLONE SODIUM SUCCINATE 30 MG: 40 INJECTION, POWDER, FOR SOLUTION INTRAMUSCULAR; INTRAVENOUS at 05:30

## 2022-02-09 RX ADMIN — ATORVASTATIN CALCIUM 80 MG: 80 TABLET, FILM COATED ORAL at 21:30

## 2022-02-09 RX ADMIN — NITROGLYCERIN 0.4 MG: 0.4 TABLET SUBLINGUAL at 04:27

## 2022-02-09 RX ADMIN — NITROGLYCERIN 1 INCH: 20 OINTMENT TOPICAL at 17:02

## 2022-02-09 RX ADMIN — METOPROLOL TARTRATE 50 MG: 50 TABLET, FILM COATED ORAL at 05:30

## 2022-02-09 RX ADMIN — LEVOFLOXACIN 750 MG: 500 TABLET, FILM COATED ORAL at 17:01

## 2022-02-09 RX ADMIN — ACETAMINOPHEN 650 MG: 325 TABLET ORAL at 05:30

## 2022-02-09 RX ADMIN — PRASUGREL 10 MG: 10 TABLET, FILM COATED ORAL at 09:00

## 2022-02-09 RX ADMIN — LORAZEPAM 0.5 MG: 0.5 TABLET ORAL at 12:12

## 2022-02-09 RX ADMIN — Medication 1 AMPULE: at 08:21

## 2022-02-09 RX ADMIN — SODIUM CHLORIDE, PRESERVATIVE FREE 5 ML: 5 INJECTION INTRAVENOUS at 21:30

## 2022-02-09 RX ADMIN — MORPHINE SULFATE 2 MG: 4 INJECTION INTRAVENOUS at 04:47

## 2022-02-09 RX ADMIN — ASPIRIN 81 MG: 81 TABLET ORAL at 08:06

## 2022-02-09 RX ADMIN — METHIMAZOLE 10 MG: 5 TABLET ORAL at 08:21

## 2022-02-09 RX ADMIN — MORPHINE SULFATE 2 MG: 4 INJECTION INTRAVENOUS at 08:12

## 2022-02-09 RX ADMIN — PREDNISONE 40 MG: 20 TABLET ORAL at 12:12

## 2022-02-09 RX ADMIN — CLONAZEPAM 0.5 MG: 0.5 TABLET ORAL at 08:05

## 2022-02-09 RX ADMIN — ALBUTEROL SULFATE 2 PUFF: 90 AEROSOL, METERED RESPIRATORY (INHALATION) at 09:09

## 2022-02-09 RX ADMIN — NITROGLYCERIN 1 INCH: 20 OINTMENT TOPICAL at 08:21

## 2022-02-09 RX ADMIN — Medication 1 AMPULE: at 21:30

## 2022-02-09 RX ADMIN — ALBUTEROL SULFATE 2.5 MG: 2.5 SOLUTION RESPIRATORY (INHALATION) at 23:22

## 2022-02-09 RX ADMIN — HEPARIN SODIUM 5000 UNITS: 5000 INJECTION INTRAVENOUS; SUBCUTANEOUS at 23:33

## 2022-02-09 RX ADMIN — TIOTROPIUM BROMIDE INHALATION SPRAY 2 PUFF: 3.12 SPRAY, METERED RESPIRATORY (INHALATION) at 08:57

## 2022-02-09 RX ADMIN — GUAIFENESIN 1200 MG: 600 TABLET ORAL at 08:06

## 2022-02-09 RX ADMIN — HEPARIN SODIUM 5000 UNITS: 5000 INJECTION INTRAVENOUS; SUBCUTANEOUS at 17:01

## 2022-02-09 RX ADMIN — SENNOSIDES AND DOCUSATE SODIUM 2 TABLET: 8.6; 5 TABLET ORAL at 08:06

## 2022-02-09 RX ADMIN — NITROGLYCERIN 0.4 MG: 0.4 TABLET SUBLINGUAL at 04:22

## 2022-02-09 RX ADMIN — PANTOPRAZOLE SODIUM 40 MG: 40 TABLET, DELAYED RELEASE ORAL at 08:06

## 2022-02-09 RX ADMIN — METOPROLOL TARTRATE 50 MG: 50 TABLET, FILM COATED ORAL at 12:13

## 2022-02-09 RX ADMIN — SODIUM CHLORIDE, PRESERVATIVE FREE 10 ML: 5 INJECTION INTRAVENOUS at 05:31

## 2022-02-09 RX ADMIN — ONDANSETRON 4 MG: 2 INJECTION INTRAMUSCULAR; INTRAVENOUS at 08:15

## 2022-02-09 RX ADMIN — PANTOPRAZOLE SODIUM 40 MG: 40 TABLET, DELAYED RELEASE ORAL at 17:01

## 2022-02-09 NOTE — PROGRESS NOTES
Pt complaining of nausea and later CP 10/10 squeezing midsternal. 2 SL nitros administered and pt reports pain 8/10. 2mg morphine given and pt reports pain 8/10. 12-lead ECG completed. Pt already on 2L oxygen. Pt had LHC 2/7 with stents to RCA and diag and on Effient. Pt has been anxious all night and unable to sleep despite 0.5mg clonazepam.     MD Blank and NP Guicho Power notified. Nitropaste ordered per NP Guicho Power. Will continue to  Monitor.

## 2022-02-09 NOTE — PROGRESS NOTES
Problem: Anxiety  Goal: *Alleviation of anxiety  Outcome: Progressing Towards Goal  Goal: *Alleviation of anxiety (Palliative Care)  Outcome: Progressing Towards Goal     Problem: Patient Education: Go to Patient Education Activity  Goal: Patient/Family Education  Outcome: Progressing Towards Goal     Problem: Patient Education: Go to Patient Education Activity  Goal: Patient/Family Education  Outcome: Progressing Towards Goal     Problem: Hip Fracture:Day of Admission Pre-op  Goal: Off Pathway (Use only if patient is Off Pathway)  Outcome: Progressing Towards Goal  Goal: Activity/Safety  Outcome: Progressing Towards Goal  Goal: Consults, if ordered  Outcome: Progressing Towards Goal  Goal: Diagnostic Test/Procedures  Outcome: Progressing Towards Goal  Goal: Nutrition/Diet  Outcome: Progressing Towards Goal  Goal: Medications  Outcome: Progressing Towards Goal  Goal: Respiratory  Outcome: Progressing Towards Goal  Goal: Treatments/Interventions/Procedures  Outcome: Progressing Towards Goal  Goal: Psychosocial  Outcome: Progressing Towards Goal  Goal: *Labs/Tests Within Defined Limits in Preparation for Surgery  Outcome: Progressing Towards Goal  Goal: *Optimal pain control at patient's stated goal  Outcome: Progressing Towards Goal  Goal: *Hemodynamically stable  Outcome: Progressing Towards Goal     Problem: Hip Fracture: Day of Surgery Post-op Care  Goal: Off Pathway (Use only if patient is Off Pathway)  Outcome: Progressing Towards Goal  Goal: Activity/Safety  Outcome: Progressing Towards Goal  Goal: Consults, if ordered  Outcome: Progressing Towards Goal  Goal: Diagnostic Test/Procedures  Outcome: Progressing Towards Goal  Goal: Nutrition/Diet  Outcome: Progressing Towards Goal  Goal: Medications  Outcome: Progressing Towards Goal  Goal: Respiratory  Outcome: Progressing Towards Goal  Goal: Treatments/Interventions/Procedures  Outcome: Progressing Towards Goal  Goal: Psychosocial  Outcome: Progressing Towards Goal  Goal: *Absence of skin breakdown  Outcome: Progressing Towards Goal  Goal: *Optimal pain control at patient's stated goal  Outcome: Progressing Towards Goal  Goal: *Hemodynamically stable  Outcome: Progressing Towards Goal     Problem: Hip Fracture: Post-Op Day 1  Goal: Off Pathway (Use only if patient is Off Pathway)  Outcome: Progressing Towards Goal  Goal: Activity/Safety  Outcome: Progressing Towards Goal  Goal: Diagnostic Test/Procedures  Outcome: Progressing Towards Goal  Goal: Nutrition/Diet  Outcome: Progressing Towards Goal  Goal: Medications  Outcome: Progressing Towards Goal  Goal: Respiratory  Outcome: Progressing Towards Goal  Goal: Treatments/Interventions/Procedures  Outcome: Progressing Towards Goal  Goal: Psychosocial  Outcome: Progressing Towards Goal  Goal: Discharge Planning  Outcome: Progressing Towards Goal  Goal: *Demonstrates progressive activity  Outcome: Progressing Towards Goal  Goal: *Optimal pain control at patient's stated goal  Outcome: Progressing Towards Goal  Goal: *Hemodynamically stable  Outcome: Progressing Towards Goal  Goal: *Discharge plan identified  Outcome: Progressing Towards Goal  Goal: *Absence of skin breakdown  Outcome: Progressing Towards Goal     Problem: Hip Fracture: Post-Op Day 2  Goal: Off Pathway (Use only if patient is Off Pathway)  Outcome: Progressing Towards Goal  Goal: Activity/Safety  Outcome: Progressing Towards Goal  Goal: Diagnostic Test/Procedures  Outcome: Progressing Towards Goal  Goal: Nutrition/Diet  Outcome: Progressing Towards Goal  Goal: Medications  Outcome: Progressing Towards Goal  Goal: Respiratory  Outcome: Progressing Towards Goal  Goal: Treatments/Interventions/Procedures  Outcome: Progressing Towards Goal  Goal: Psychosocial  Outcome: Progressing Towards Goal  Goal: Discharge Planning  Outcome: Progressing Towards Goal  Goal: *Optimal pain control at patient's stated goal  Outcome: Progressing Towards Goal  Goal: *Hemodynamically stable  Outcome: Progressing Towards Goal  Goal: *Adequate oxygenation  Outcome: Progressing Towards Goal  Goal: *Tolerates increased activity  Outcome: Progressing Towards Goal  Goal: *Tolerates nutrition therapy  Outcome: Progressing Towards Goal  Goal: *Absence of skin breakdown  Outcome: Progressing Towards Goal     Problem: Hip Fracture: Post-Op Day 3  Goal: Off Pathway (Use only if patient is Off Pathway)  Outcome: Progressing Towards Goal  Goal: Activity/Safety  Outcome: Progressing Towards Goal  Goal: Diagnostic Test/Procedures  Outcome: Progressing Towards Goal  Goal: Nutrition/Diet  Outcome: Progressing Towards Goal  Goal: Medications  Outcome: Progressing Towards Goal  Goal: Respiratory  Outcome: Progressing Towards Goal  Goal: Treatments/Interventions/Procedures  Outcome: Progressing Towards Goal  Goal: Psychosocial  Outcome: Progressing Towards Goal  Goal: Discharge Planning  Outcome: Progressing Towards Goal  Goal: *Met physical therapy criteria for discharge to next level of care  Outcome: Progressing Towards Goal  Goal: *Optimal pain control at patient's stated goal  Outcome: Progressing Towards Goal  Goal: *Hemodynamically stable  Outcome: Progressing Towards Goal  Goal: *Tolerating diet  Outcome: Progressing Towards Goal  Goal: *Active bowel function  Outcome: Progressing Towards Goal  Goal: *Adequate urinary output  Outcome: Progressing Towards Goal  Goal: *Absence of skin breakdown  Outcome: Progressing Towards Goal  Goal: *Patient verbalizes understanding of discharge instructions  Outcome: Progressing Towards Goal     Problem: Hip Fracture: Post-Op Day 4  Goal: Off Pathway (Use only if patient is Off Pathway)  Outcome: Progressing Towards Goal  Goal: Activity/Safety  Outcome: Progressing Towards Goal  Goal: Diagnostic Test/Procedures  Outcome: Progressing Towards Goal  Goal: Nutrition/Diet  Outcome: Progressing Towards Goal  Goal: Medications  Outcome: Progressing Towards Goal  Goal: Respiratory  Outcome: Progressing Towards Goal  Goal: Treatments/Interventions/Procedures  Outcome: Progressing Towards Goal  Goal: Psychosocial  Outcome: Progressing Towards Goal  Goal: Discharge Planning  Outcome: Progressing Towards Goal  Goal: *Met physical therapy criteria for discharge to next level of care  Outcome: Progressing Towards Goal  Goal: *Optimal pain control at patient's stated goal  Outcome: Progressing Towards Goal  Goal: *Hemodynamically stable  Outcome: Progressing Towards Goal  Goal: *Tolerating diet  Outcome: Progressing Towards Goal  Goal: *Active bowel function  Outcome: Progressing Towards Goal  Goal: *Adequate urinary output  Outcome: Progressing Towards Goal  Goal: *Absence of skin breakdown  Outcome: Progressing Towards Goal  Goal: *Patient verbalizes understanding of discharge instructions  Outcome: Progressing Towards Goal     Problem: Falls - Risk of  Goal: *Absence of Falls  Description: Document Joyce Fall Risk and appropriate interventions in the flowsheet.   Outcome: Progressing Towards Goal  Note: Fall Risk Interventions:  Mobility Interventions: Bed/chair exit alarm,Communicate number of staff needed for ambulation/transfer,PT Consult for mobility concerns,PT Consult for assist device competence,Utilize walker, cane, or other assistive device,Patient to call before getting OOB         Medication Interventions: Bed/chair exit alarm,Evaluate medications/consider consulting pharmacy,Patient to call before getting OOB,Teach patient to arise slowly    Elimination Interventions: Bed/chair exit alarm,Call light in reach,Patient to call for help with toileting needs    History of Falls Interventions: Bed/chair exit alarm,Evaluate medications/consider consulting pharmacy

## 2022-02-09 NOTE — PROGRESS NOTES
Hospitalist Progress Note   Admit Date:  2/3/2022 10:44 PM   Name:  Tiago Curry   Age:  66 y.o. Sex:  female  :  1950   MRN:  [id number]   Room:  [facility]/01    Presenting Complaint: Hip Injury    Reason(s) for Admission: Other fracture of right femur, initial encounter for closed fracture [facility]) [address] Interval History:   Brief Hospital course: 67-year-old female with history of COPD, chronic hypoxic respiratory failure, coronary artery disease, hypertension admitted after mechanical fall and found to have hip fracture. She is status post surgery. After surgery, she started having chest pain. Ultimately she underwent cardiac catheterization on  and she is status post PCI. Also, her CT PE was negative for pulmonary embolism but showed pulmonary lesions. Given the timing of pulmonary reason, there is concern about pneumonia and she was started on Levaquin. Given patient fracture and ongoing anxiety/chest pain, thyroid profile was checked and she was found to have hyperthyroidism. Subjective/24hr Events (22):  Pt describing some pain undernearth left ribs, described in her words as \"encroachment\" type pain. Not worse with deep inspiration. I doubt this is cardiac. No wheezing, lungs sound clear but poor insp effort. Heart sounds normal on exam.    Assessment & Plan: Will check CXR, cardiac enzymes, BNP. May need lasix     Need better BP control; may be driving symptoms. Start lisinopril and PRN labetalol    Will give low dose ativan x1 for possible anxiety component also. Can go to [facility] for rehab when life-threatening issues ruled out. Also oxygenation lneeds improvement    I spent 35 minutes of time caring for this patient at bedside or nearby on the unit, more than 50 percent of which was spent on coordination of care and/or counseling regarding the disease process, treatment options, and treatment plan.     Diet:  ADULT DIET Regular; No Salt Added (3-4 gm)  DVT PPx: heparin  Code status: Full Code    Hospital Problems as of 2/9/2022 Date Reviewed: 11/9/2021          Codes Class Noted - Resolved POA    Nonrheumatic mitral valve regurgitation (Chronic) ICD-10-CM: I34.0  ICD-9-CM: 424.0  2/8/2022 - Present Unknown        Iron deficiency anemia due to chronic blood loss (Chronic) ICD-10-CM: D50.0  ICD-9-CM: 280.0  2/8/2022 - Present Unknown        Closed right hip fracture ([facility] ICD-10-CM: S72.001A  ICD-9-CM: 820.8  2/4/2022 - Present Unknown        * (Principal) Other fracture of right femur, initial encounter for closed fracture [facility]) ICD-10-CM: Y46.1L6P  ICD-9-CM: 821.00  2/4/2022 - Present Unknown        Pulmonary mass ICD-10-CM: R91.8  ICD-9-CM: 786.6  2/11/2020 - Present Unknown        CAD S/P percutaneous coronary angioplasty ICD-10-CM: I25.10, Z98.61  ICD-9-CM: 414.01, V45.82  5/31/2019 - Present Yes        Hyperlipidemia ICD-10-CM: E78.5  ICD-9-CM: 272.4  10/10/2016 - Present Yes        Essential hypertension, benign ICD-10-CM: I10  ICD-9-CM: 401.1  10/10/2016 - Present Yes        Acute respiratory failure with hypoxia [facility]) ICD-10-CM: J96.01  ICD-9-CM: 518.81  8/22/2013 - Present Unknown        COPD, very severe ([facility] ICD-10-CM: J44.9  ICD-9-CM: 553  8/20/2013 - Present Yes              Objective:     Patient Vitals for the past 24 hrs:   Temp Pulse Resp BP SpO2   02/09/22 0909 -- -- -- -- 93 %   02/09/22 0805 97.9 °F (36.6 °C) 100 20 (!) 176/104 93 %   02/09/22 0443 97.5 °F (36.4 °C) (!) 111 20 (!) 170/85 98 %   02/09/22 0433 -- 86 -- (!) 156/91 --   02/09/22 0427 -- 96 -- (!) 152/80 --   02/09/22 0425 -- -- -- (!) 168/103 --   02/09/22 0420 -- 99 -- (!) 171/78 --   02/09/22 0203 -- -- -- -- 94 %   02/09/22 0023 98 °F (36.7 °C) 88 20 (!) 164/90 97 %   02/08/22 2352 -- 89 -- -- --   02/08/22 2351 -- -- -- (!) 164/90 --   02/08/22 1952 -- -- -- -- 97 %   02/08/22 1902 97.8 °F (36.6 °C) 89 17 138/85 96 %   02/08/22 1820 98.6 °F (37 °C) 90 19 (!) 141/74 100 %   02/08/22 1720 98.2 °F (36.8 °C) 92 19 (!) 142/77 97 %   02/08/22 1620 98.4 °F (36.9 °C) 83 20 132/77 98 %   02/08/22 1600 97.5 °F (36.4 °C) 90 20 123/78 94 %   02/08/22 1539 -- -- -- -- 98 %   02/08/22 1520 97.9 °F (36.6 °C) 84 19 125/64 98 %   02/08/22 1505 97.8 °F (36.6 °C) 95 20 116/64 97 %   02/08/22 1358 -- -- -- (!) 107/52 --   02/08/22 1243 -- -- -- -- 98 %     Oxygen Therapy  O2 Sat (%): 93 % (02/09/22 0909)  Pulse via Oximetry: 93 beats per minute (02/09/22 0909)  O2 Device: Nasal cannula (02/09/22 0909)  Skin Assessment: Clean, dry, & intact (02/09/22 0400)  Skin Protection for O2 Device: No (02/09/22 0400)  O2 Flow Rate (L/min): 3 l/min (02/09/22 0909)  FIO2 (%): 70 % (02/06/22 0707)    Estimated body mass index is 23.31 kg/m² as calculated from the following:    Height as of this encounter: 5' 8\" (1.727 m). Weight as of this encounter: 69.5 kg (153 lb 4.8 oz). Intake/Output Summary (Last 24 hours) at 2/9/2022 1143  Last data filed at 2/9/2022 0400  Gross per 24 hour   Intake 597.5 ml   Output 650 ml   Net -52.5 ml         Physical Exam:   Blood pressure (!) 176/104, pulse 100, temperature 97.9 °F (36.6 °C), resp. rate 20, height 5' 8\" (1.727 m), weight 69.5 kg (153 lb 4.8 oz), SpO2 93 %. General:    Well nourished. No overt distress  Head:  Normocephalic, atraumatic  Eyes:  Sclerae appear normal.  Pupils equally round. ENT:  Nares appear normal, no drainage. Moist oral mucosa  Neck:  No restricted ROM. Trachea midline   CV:   RRR. No m/r/g. No jugular venous distension. Lungs:   CTAB. No wheezing, rhonchi, or rales. Respirations even, unlabored  Abdomen:   nondistended. Extremities: No cyanosis or clubbing. No edema  Skin:     No rashes and normal coloration. Warm and dry. Neuro:  CN II-XII grossly intact. Sensation intact. A&Ox3  Psych:  Normal mood and affect.       I have reviewed ordered lab tests and independently visualized imaging below:    Recent Labs:  Recent Results (from the past 48 hour(s))   POC ACTIVATED CLOTTING TIME    Collection Time: 02/07/22 11:57 AM   Result Value Ref Range    Activated Clotting Time (POC) 267 (H) 70 - 128 SECS   TSH 3RD GENERATION    Collection Time: 02/07/22  5:07 PM   Result Value Ref Range    TSH 0.101 (L) 0.358 - 3.740 uIU/mL   T4, FREE    Collection Time: 02/07/22  5:07 PM   Result Value Ref Range    T4, Free 2.4 (H) 0.78 - 1.46 NG/DL   VITAMIN D, 25 HYDROXY    Collection Time: 02/07/22  5:07 PM   Result Value Ref Range    Vitamin D 25-Hydroxy 30.1 30.0 - 100.0 ng/mL   MAGNESIUM    Collection Time: 02/07/22  5:07 PM   Result Value Ref Range    Magnesium 2.1 1.6 - 2.3 mg/dL   CULTURE, RESPIRATORY/SPUTUM/BRONCH W GRAM STAIN    Collection Time: 02/08/22  2:33 AM    Specimen: Sputum   Result Value Ref Range    Special Requests: NO SPECIAL REQUESTS      GRAM STAIN 0 TO 3 WBCS PER OIF     GRAM STAIN 0 TO 1  EPITHELIAL CELLS SEEN     GRAM STAIN MODERATE GRAM POSITIVE COCCI      GRAM STAIN 4+ MUCUS PRESENT      Culture result: MODERATE NORMAL RESPIRATORY KELLY     METABOLIC PANEL, BASIC    Collection Time: 02/08/22  4:22 AM   Result Value Ref Range    Sodium 140 136 - 145 mmol/L    Potassium 4.0 3.5 - 5.1 mmol/L    Chloride 105 98 - 107 mmol/L    CO2 30 21 - 32 mmol/L    Anion gap 5 (L) 7 - 16 mmol/L    Glucose 152 (H) 65 - 100 mg/dL    BUN 18 8 - 23 MG/DL    Creatinine 0.80 0.6 - 1.0 MG/DL    GFR est AA >60 >60 ml/min/1.73m2    GFR est non-AA >60 >60 ml/min/1.73m2    Calcium 9.2 8.3 - 10.4 MG/DL   CBC W/O DIFF    Collection Time: 02/08/22  4:22 AM   Result Value Ref Range    WBC 10.0 4.3 - 11.1 K/uL    RBC 2.45 (L) 4.05 - 5.2 M/uL    HGB 7.1 (L) 11.7 - 15.4 g/dL    HCT 22.8 (L) 35.8 - 46.3 %    MCV 93.1 79.6 - 97.8 FL    MCH 29.0 26.1 - 32.9 PG    MCHC 31.1 (L) 31.4 - 35.0 g/dL    RDW 14.4 11.9 - 14.6 %    PLATELET 856 871 - 711 K/uL    MPV 10.0 9.4 - 12.3 FL    ABSOLUTE NRBC 0.00 0.0 - 0.2 K/uL   HEPATIC FUNCTION PANEL    Collection Time: 02/08/22  4:22 AM   Result Value Ref Range    Protein, total 6.0 (L) 6.3 - 8.2 g/dL    Albumin 2.5 (L) 3.2 - 4.6 g/dL    Globulin 3.5 2.3 - 3.5 g/dL    A-G Ratio 0.7 (L) 1.2 - 3.5      Bilirubin, total 0.3 0.2 - 1.1 MG/DL    Bilirubin, direct <0.1 <0.4 MG/DL    Alk. phosphatase 57 50 - 136 U/L    AST (SGOT) 94 (H) 15 - 37 U/L    ALT (SGPT) 29 12 - 65 U/L   RBC, ALLOCATE    Collection Time: 02/08/22  7:45 AM   Result Value Ref Range    HISTORY CHECKED?  Historical check performed    TYPE & SCREEN    Collection Time: 02/08/22 11:36 AM   Result Value Ref Range    Crossmatch Expiration 02/11/2022,2359     ABO/Rh(D) A NEGATIVE     Antibody screen NEG     Unit number [id number]     Blood component type [facility]     Unit division 00     Status of unit TRANSFUSED     ANTIGEN/ANTIBODY INFO Jk(A) NEGATIVE,  KIMI NEGATIVE,       Crossmatch result Compatible    FERRITIN    Collection Time: 02/08/22  3:15 PM   Result Value Ref Range    Ferritin 228 8 - 388 NG/ML   TRANSFERRIN SATURATION    Collection Time: 02/08/22  3:15 PM   Result Value Ref Range    Iron 49 35 - 150 ug/dL    TIBC 235 (L) 250 - 450 ug/dL    Transferrin Saturation 21 >20 %   VITAMIN B12    Collection Time: 02/08/22  3:15 PM   Result Value Ref Range    Vitamin B12 663 193 - 986 pg/mL   FOLATE    Collection Time: 02/08/22  3:15 PM   Result Value Ref Range    Folate 16.2 3.1 - 17.5 ng/mL   HGB & HCT    Collection Time: 02/08/22 10:44 PM   Result Value Ref Range    HGB 8.9 (L) 11.7 - 15.4 g/dL    HCT 27.7 (L) 35.8 - 46.3 %   CBC WITH AUTOMATED DIFF    Collection Time: 02/09/22  3:55 AM   Result Value Ref Range    WBC 9.9 4.3 - 11.1 K/uL    RBC 3.13 (L) 4.05 - 5.2 M/uL    HGB 9.2 (L) 11.7 - 15.4 g/dL    HCT 28.1 (L) 35.8 - 46.3 %    MCV 89.8 79.6 - 97.8 FL    MCH 29.4 26.1 - 32.9 PG    MCHC 32.7 31.4 - 35.0 g/dL    RDW 15.0 (H) 11.9 - 14.6 %    PLATELET 447 959 - 492 K/uL    MPV 10.1 9.4 - 12.3 FL    ABSOLUTE NRBC 0.04 0.0 - 0.2 K/uL    DF AUTOMATED      NEUTROPHILS 89 (H) 43 - 78 %    LYMPHOCYTES 5 (L) 13 - 44 %    MONOCYTES 5 4.0 - 12.0 %    EOSINOPHILS 0 (L) 0.5 - 7.8 %    BASOPHILS 0 0.0 - 2.0 %    IMMATURE GRANULOCYTES 1 0.0 - 5.0 %    ABS. NEUTROPHILS 8.9 (H) 1.7 - 8.2 K/UL    ABS. LYMPHOCYTES 0.5 0.5 - 4.6 K/UL    ABS. MONOCYTES 0.5 0.1 - 1.3 K/UL    ABS. EOSINOPHILS 0.0 0.0 - 0.8 K/UL    ABS. BASOPHILS 0.0 0.0 - 0.2 K/UL    ABS. IMM.  GRANS. 0.1 0.0 - 0.5 K/UL   METABOLIC PANEL, BASIC    Collection Time: 02/09/22  3:55 AM   Result Value Ref Range    Sodium 137 136 - 145 mmol/L    Potassium 4.0 3.5 - 5.1 mmol/L    Chloride 103 98 - 107 mmol/L    CO2 28 21 - 32 mmol/L    Anion gap 6 (L) 7 - 16 mmol/L    Glucose 182 (H) 65 - 100 mg/dL    BUN 19 8 - 23 MG/DL    Creatinine 0.80 0.6 - 1.0 MG/DL    GFR est AA >60 >60 ml/min/1.73m2    GFR est non-AA >60 >60 ml/min/1.73m2    Calcium 9.4 8.3 - 10.4 MG/DL   EKG, 12 LEAD, INITIAL    Collection Time: 02/09/22  4:45 AM   Result Value Ref Range    Ventricular Rate 97 BPM    Atrial Rate 97 BPM    P-R Interval 150 ms    QRS Duration 78 ms    Q-T Interval 348 ms    QTC Calculation (Bezet) 441 ms    Calculated P Axis 86 degrees    Calculated R Axis 69 degrees    Calculated T Axis 92 degrees    Diagnosis       Sinus rhythm with Premature atrial complexes  Abnormal ECG  When compared with ECG of 09-FEB-2022 04:43,  Fusion complexes are no longer Present  Premature ventricular complexes are no longer Present  Premature atrial complexes are now Present  Confirmed by Shereen Mahan ([id number]) on 2/9/2022 7:05:33 AM         All Micro Results     Procedure Component Value Units Date/Time    CULTURE, RESPIRATORY/SPUTUM/BRONCH [address] STAIN [[id number]] Collected: 02/08/22 023    Order Status: Completed Specimen: Sputum Updated: 02/09/22 7458     Special Requests: NO SPECIAL REQUESTS        GRAM STAIN 0 TO 3 WBCS PER OIF      0 TO 1  EPITHELIAL CELLS SEEN            MODERATE GRAM POSITIVE COCCI            4+ MUCUS PRESENT Culture result:       MODERATE NORMAL RESPIRATORY KELLY          CULTURE, BLOOD [[id number]] Collected: 02/06/22 1400    Order Status: Completed Specimen: Blood Updated: 02/09/22 0727     Special Requests: --        RIGHT  HAND       Culture result: NO GROWTH 3 DAYS       CULTURE, BLOOD [[id number]] Collected: 02/06/22 1954    Order Status: Completed Specimen: Blood Updated: 02/09/22 0727     Special Requests: LEFT PICC LINE        Culture result: NO GROWTH 3 DAYS       RESPIRATORY VIRUS PANEL W/COVID-19, PCR [[id number]] Collected: 02/06/22 1844    Order Status: Completed Specimen: Nasopharyngeal Updated: 02/06/22 2135     Adenovirus NOT DETECTED        Coronavirus 229E NOT DETECTED        Coronavirus HKU1 NOT DETECTED        Coronavirus CVNL63 NOT DETECTED        Coronavirus OC43 NOT DETECTED        SARS-CoV-2, PCR NOT DETECTED        Metapneumovirus NOT DETECTED        Rhinovirus and Enterovirus NOT DETECTED        Influenza A NOT DETECTED        Influenza B NOT DETECTED        Parainfluenza 1 NOT DETECTED        Parainfluenza 2 NOT DETECTED        Parainfluenza 3 NOT DETECTED        Parainfluenza virus 4 NOT DETECTED        RSV by PCR NOT DETECTED        B. parapertussis, PCR NOT DETECTED        Bordetella pertussis - PCR NOT DETECTED        Chlamydophila pneumoniae DNA, QL, PCR NOT DETECTED        Mycoplasma pneumoniae DNA, QL, PCR NOT DETECTED       MSSA/MRSA SC BY PCR, NASAL SWAB [[id number]] Collected: 02/04/22 0315    Order Status: Completed Specimen: Nasal swab Updated: 02/04/22 0648     Special Requests: NO SPECIAL REQUESTS        Culture result:       SA target not detected. A MRSA NEGATIVE, SA NEGATIVE test result does not preclude MRSA or SA nasal colonization.           COVID-19 RAPID TEST [[id number]] Collected: 02/03/22 2343    Order Status: Completed Specimen: Nasopharyngeal Updated: 02/04/22 0037     Specimen source NASAL        COVID-19 rapid test Not detected Comment:      The specimen is NEGATIVE for SARS-CoV-2, the novel coronavirus associated with COVID-19. A negative result does not rule out COVID-19. This test has been authorized by the FDA under an Emergency Use Authorization (EUA) for use by authorized laboratories. Fact sheet for Healthcare Providers: Conventionrelocalitydate.co.nz  Fact sheet for Patients: [facility]       Methodology: Isothermal Nucleic Acid Amplification               Other Studies:  US THYROID/PARATHYROID/SOFT TISS    Result Date: 2/8/2022  Exam: Thyroid ultrasound. Indication: Hyperthyroidism Comparison: None. Findings: Right Lobe: The right lobe of the thyroid is normal in echogenicity and measures 3.8 x 1.1 x 1.6 cm. Subcentimeter spongiform benign nodule in the upper pole measuring up to 0.3 cm. No additional thyroid nodule in the right lobe. Left Lobe: The left lobe of the thyroid is normal in echogenicity and measures 4.1 x 1.4 x 2.0 cm. No thyroid nodule identified. Isthmus: The isthmus measure 0.3 cm. No thyroid nodule identified. Subcentimeter benign spongiform nodule in the right lobe measuring up to 0.3 cm. Otherwise, unremarkable thyroid ultrasound.        Current Meds:  Current Facility-Administered Medications   Medication Dose Route Frequency    hydrALAZINE (APRESOLINE) 20 mg/mL injection 20 mg  20 mg IntraVENous Q4H PRN    predniSONE (DELTASONE) tablet 40 mg  40 mg Oral DAILY WITH LUNCH    lisinopriL (PRINIVIL, ZESTRIL) tablet 10 mg  10 mg Oral DAILY    LORazepam (ATIVAN) tablet 0.5 mg  0.5 mg Oral Q4H PRN    LORazepam (ATIVAN) tablet 0.5 mg  0.5 mg Oral ONCE    0.9% sodium chloride infusion 250 mL  250 mL IntraVENous PRN    methIMAzole (TAPAZOLE) tablet 10 mg  10 mg Oral DAILY    albuterol (PROVENTIL HFA, VENTOLIN HFA, PROAIR HFA) inhaler 2 Puff  2 Puff Inhalation Q6H PRN    levoFLOXacin (LEVAQUIN) tablet 750 mg  750 mg Oral Q24H    heparin (porcine) injection 5,000 Units  5,000 Units SubCUTAneous Q8H    nitroglycerin (NITROBID) 2 % ointment 1 Inch  1 Inch Topical BID    morphine injection 2 mg  2 mg IntraVENous Q4H PRN    acetaminophen (TYLENOL) tablet 650 mg  650 mg Oral Q8H    lidocaine 4 % patch 1 Patch  1 Patch TransDERmal Q24H    metoprolol tartrate (LOPRESSOR) tablet 50 mg  50 mg Oral Q6H    prasugreL (EFFIENT) tablet 10 mg  10 mg Oral DAILY    albuterol (PROVENTIL VENTOLIN) nebulizer solution 2.5 mg  2.5 mg Nebulization QID RT    albuterol (PROVENTIL VENTOLIN) nebulizer solution 2.5 mg  2.5 mg Nebulization Q4H PRN    albuterol-ipratropium (DUO-NEB) 2.5 MG-0.5 MG/3 ML  3 mL Nebulization Q6H PRN    guaiFENesin ER (MUCINEX) tablet 1,200 mg  1,200 mg Oral Q12H    budesonide (PULMICORT) 500 mcg/2 ml nebulizer suspension  1,000 mcg Nebulization BID RT    nitroglycerin (NITROSTAT) tablet 0.4 mg  0.4 mg SubLINGual PRN    atorvastatin (LIPITOR) tablet 80 mg  80 mg Oral QHS    aspirin delayed-release tablet 81 mg  81 mg Oral DAILY    [Held by provider] budesonide-formoteroL (SYMBICORT) 160-4.5 mcg/actuation HFA inhaler 2 Puff  2 Puff Inhalation BID RT    pantoprazole (PROTONIX) tablet 40 mg  40 mg Oral BID    tiotropium bromide (SPIRIVA RESPIMAT) 2.5 mcg /actuation  2 Puff Inhalation DAILY    alcohol 62% (NOZIN) nasal  1 Ampule  1 Ampule Topical Q12H    sodium chloride (NS) flush 5-40 mL  5-40 mL IntraVENous PRN    naloxone (NARCAN) injection 0.2-0.4 mg  0.2-0.4 mg IntraVENous Q10MIN PRN    senna-docusate (PERICOLACE) 8.6-50 mg per tablet 2 Tablet  2 Tablet Oral DAILY    ondansetron (ZOFRAN) injection 4 mg  4 mg IntraVENous Q4H PRN       Signed:  Ibis Hermosillo MD    Part of this note may have been written by using a voice dictation software. The note has been proof read but may still contain some grammatical/other typographical errors.

## 2022-02-09 NOTE — PROGRESS NOTES
Pt complaining of nausea and CP 10/10. 2 SL nitros administered 5 minutes apart and pt reports pain 8/10. 12-lead ECG done at bedside and placed in chart with charge RN and 2 RNs assistingh. Morphine administered and pt reports pain 8/10. Pt already on 2L oxygen. NP Sandra Carranza notified. Will continue to monitor.

## 2022-02-09 NOTE — PROGRESS NOTES
CM met with pt this AM. Pt had complained of SOB/pain. O2 at 3L. B/P medications adjusted. Labs drawn. CM spoke with Patricia Rincon with [facility]. Pt not medically stable to discharge this day. CM will continue to follow to update DCP.

## 2022-02-09 NOTE — PROGRESS NOTES
[facility] [address]  Admission Date: 2/3/2022         Daily Progress Note: 2/9/2022    The patient's chart is reviewed and the patient is discussed with the staff. Background:   Patient is a 66 y.o.  female seen and evaluated at the request of Dr. Madisyn Alan for hypoxia and concerns for PE. Pt just had CT scan completed, which was negative for PE, but does show some irregular spiculated 2.8 cm lesion in LLL. Pt is known to us in the office for very severe COPD with centrilobular emphysema. She was referred to [facility] for possible lung transplant but was denied. She had a navigation bronchoscopy with attempts at biopsy of a CANDELARIO nodule 2/11/20 which was difficult to visualize with radial probe and ultimately was non diagnostic. PET scan showed this area to have an SUV of 2.1. She was to have a bronchoscopy attempt at [facility] in July but repeat imaging performed here showed improvement in this area so the bronchoscopy has been cancelled. Using symbicort, spiriva, daliresp and azithromycin OP. Has completed pulmonary rehab. She wears O2 at night on 1 L. Recently completed steroid taper. She was admitted for R hip fracture and underwent surgery for repair on 2/4/22  This morning had an acute change in her O2 needs requiring high FiO2 on bipap. She was given 40mg lasix IV and sent for CTA chest due to concerns for PE. Pt has now been weaned to 4L NC, but still having increased work of breathing. She is wheezing and coughing, although not productive. Denies fevers. Per nursing, cano removed several days ago and has had issues with retention vs low UOP since. Bladder scans with up to 500ml in bladder requiring in and out caths.     Subjective:     S/P PCI on 2/7/22  Now on 2L O2 NC  Having issues with chest pain    Current Facility-Administered Medications   Medication Dose Route Frequency    predniSONE (DELTASONE) tablet 40 mg  40 mg Oral DAILY WITH LUNCH    lisinopriL (PRINIVIL, ZESTRIL) tablet 10 mg  10 mg Oral DAILY    LORazepam (ATIVAN) tablet 0.5 mg  0.5 mg Oral Q4H PRN    LORazepam (ATIVAN) tablet 0.5 mg  0.5 mg Oral ONCE    labetaloL (NORMODYNE;TRANDATE) injection 20 mg  20 mg IntraVENous Q4H PRN    0.9% sodium chloride infusion 250 mL  250 mL IntraVENous PRN    methIMAzole (TAPAZOLE) tablet 10 mg  10 mg Oral DAILY    albuterol (PROVENTIL HFA, VENTOLIN HFA, PROAIR HFA) inhaler 2 Puff  2 Puff Inhalation Q6H PRN    levoFLOXacin (LEVAQUIN) tablet 750 mg  750 mg Oral Q24H    heparin (porcine) injection 5,000 Units  5,000 Units SubCUTAneous Q8H    nitroglycerin (NITROBID) 2 % ointment 1 Inch  1 Inch Topical BID    morphine injection 2 mg  2 mg IntraVENous Q4H PRN    acetaminophen (TYLENOL) tablet 650 mg  650 mg Oral Q8H    lidocaine 4 % patch 1 Patch  1 Patch TransDERmal Q24H    metoprolol tartrate (LOPRESSOR) tablet 50 mg  50 mg Oral Q6H    prasugreL (EFFIENT) tablet 10 mg  10 mg Oral DAILY    albuterol (PROVENTIL VENTOLIN) nebulizer solution 2.5 mg  2.5 mg Nebulization QID RT    albuterol (PROVENTIL VENTOLIN) nebulizer solution 2.5 mg  2.5 mg Nebulization Q4H PRN    albuterol-ipratropium (DUO-NEB) 2.5 MG-0.5 MG/3 ML  3 mL Nebulization Q6H PRN    guaiFENesin ER (MUCINEX) tablet 1,200 mg  1,200 mg Oral Q12H    budesonide (PULMICORT) 500 mcg/2 ml nebulizer suspension  1,000 mcg Nebulization BID RT    nitroglycerin (NITROSTAT) tablet 0.4 mg  0.4 mg SubLINGual PRN    atorvastatin (LIPITOR) tablet 80 mg  80 mg Oral QHS    aspirin delayed-release tablet 81 mg  81 mg Oral DAILY    [Held by provider] budesonide-formoteroL (SYMBICORT) 160-4.5 mcg/actuation HFA inhaler 2 Puff  2 Puff Inhalation BID RT    pantoprazole (PROTONIX) tablet 40 mg  40 mg Oral BID    tiotropium bromide (SPIRIVA RESPIMAT) 2.5 mcg /actuation  2 Puff Inhalation DAILY    alcohol 62% (NOZIN) nasal  1 Ampule  1 Ampule Topical Q12H    sodium chloride (NS) flush 5-40 mL  5-40 mL IntraVENous PRN    naloxone (NARCAN) injection 0.2-0.4 mg  0.2-0.4 mg IntraVENous Q10MIN PRN    senna-docusate (PERICOLACE) 8.6-50 mg per tablet 2 Tablet  2 Tablet Oral DAILY    ondansetron (ZOFRAN) injection 4 mg  4 mg IntraVENous Q4H PRN     Review of Systems  Constitutional: negative for fever, chills, sweats  Cardiovascular: negative for chest pain, palpitations, syncope, edema  Gastrointestinal:  negative for dysphagia, reflux, vomiting, diarrhea, abdominal pain, or melena  Neurologic:  negative for focal weakness, numbness, headache    Objective:     Vitals:    02/09/22 0443 02/09/22 0805 02/09/22 0909 02/09/22 1157   BP: (!) 170/85 (!) 176/104  (!) 163/85   Pulse: (!) 111 100  91   Resp: 20 20  20   Temp: 97.5 °F (36.4 °C) 97.9 °F (36.6 °C)  98.8 °F (37.1 °C)   SpO2: 98% 93% 93% 93%   Weight: 153 lb 4.8 oz (69.5 kg)      Height:           Intake/Output Summary (Last 24 hours) at 2/9/2022 1203  Last data filed at 2/9/2022 0400  Gross per 24 hour   Intake 597.5 ml   Output 650 ml   Net -52.5 ml     Physical Exam:   Constitution:  the patient is well developed and in no acute distress  HEENT:  Sclera clear, pupils equal, oral mucosa moist  Respiratory: On 2L NC, no wheezing  Cardiovascular:  RRR without M,G,R  Gastrointestinal: soft and non-tender; with positive bowel sounds. Musculoskeletal: warm without cyanosis. There is trace to 1+ lower extremity edema.   Skin:  no jaundice or rashes, no wounds   Neurologic: no gross neuro deficits     Psychiatric:  alert and oriented x ppt    CXR:  Pending      LAB:  Recent Labs     02/09/22  0355 02/08/22  2244 02/08/22  0422 02/07/22  0501 02/07/22  0501 02/06/22  1954 02/06/22  1846   WBC 9.9  --  10.0  --  8.9   < > 9.5   HGB 9.2* 8.9* 7.1*   < > 7.6*   < > 7.8*   HCT 28.1* 27.7* 22.8*   < > 23.7*   < > 24.6*     --  214  --  201   < > 180   PCT  --   --   --   --   --   --  1.06*   LAC  --   --   --   --   --   --  2.6*    < > = values in this interval not displayed. Recent Labs     02/09/22  0355 02/08/22  0422 02/07/22  1707 02/07/22  0501    140  --  139   K 4.0 4.0  --  3.9    105  --  106   CO2 28 30  --  29   * 152*  --  185*   BUN 19 18  --  11   CREA 0.80 0.80  --  0.70   MG  --   --  2.1  --    CA 9.4 9.2  --  9.1   ALB  --  2.5*  --  2.5*   AST  --  94*  --  147*   ALT  --  29  --  29   AP  --  57  --  64     Recent Labs     02/06/22  1846 02/06/22  1303   LAC 2.6*  --    TROPHS  --  11,008.4*   BNPNT 10,504*  --      No results for input(s): PH, PCO2, PO2, HCO3, PHI, PCO2I, PO2I, HCO3I in the last 72 hours. Recent Labs     02/08/22  0233 02/06/22  1954 02/06/22  1400   CULT MODERATE NORMAL RESPIRATORY KELLY NO GROWTH 3 DAYS NO GROWTH 3 DAYS     Assessment and Plan:  (Medical Decision Making)   Principal Problem:    Other fracture of right femur, initial encounter for closed fracture ([address] (2/4/2022)        Active Problems:    COPD, very severe ([address] (8/20/2013)    Known and turned down from transplant. Does not seem to be in exacerbation at present. Continue budesonide nebulizers and Spiriva inhaler. Acute respiratory failure with hypoxia ([address] (8/22/2013)    Currently on 2L O2. Wean as tolerated. Hyperlipidemia (10/10/2016)          Essential hypertension, benign (10/10/2016)          CAD S/P percutaneous coronary angioplasty (5/31/2019)          Pulmonary mass (2/11/2020)    Infiltrative appearing nodules. Has had similar ones in the past eval with whit bronch and benign. Treating with levaquin and outpatient follow up imaging. Closed right hip fracture ([address] (2/4/2022)          Nonrheumatic mitral valve regurgitation (2/8/2022)          Iron deficiency anemia due to chronic blood loss (2/8/2022)          More than 50% of the time documented was spent in face-to-face contact with the patient and in the care of the patient on the floor/unit where the patient is located.     Carlos Malcolm NP    I have spoken with and examined the patient. I agree with the above assessment and plan as documented.     Gen: awake  Lungs:  diminihsed  Heart:  RRR with no Murmur/Rubs/Gallops  Abd:soft, NT  Ext: plus 1 edema    Continue LEvaquin ,needs follow up Ct in 6-8 weeks with follow up  Appointment     Andrew Parisi MD

## 2022-02-09 NOTE — PROGRESS NOTES
ACUTE PHYSICAL THERAPY GOALS:  (Developed with and agreed upon by patient and/or caregiver.)  1. Pt will perform bed mobility Mod (I) c use of rails PRN in 7 therapy sessions. 2. Pt will perform sit-to-stand/ stand-to-sit transfers Mod (I) c use of LRAD in 7 therapy sessions. 3. Pt will ambulate 100 ft under (S) with use of LRAD and breaks as needed in 7 therapy sessions. 4. Pt will perform standing balance activities with minimal postural sway in 7 therapy sessions. 5. Pt will tolerate multiple sets and reps of BLE exercises in 7 therapy sessions. PHYSICAL THERAPY: Daily Note and PM Treatment Day # 3     WBAT ISATU More is a 66 y.o. female   PRIMARY DIAGNOSIS: Other fracture of right femur, initial encounter for closed fracture ([facility]  Other fracture of right femur, initial encounter for closed fracture ([facility] [S72.8X1A]  Procedure(s) (LRB):  LEFT HEART CATH / CORONARY ANGIOGRAPHY (N/A)  PERCUTANEOUS CORONARY INTERVENTION (N/A)  2 Days Post-Op    ASSESSMENT:     REHAB RECOMMENDATIONS: CURRENT LEVEL OF FUNCTION:  (Most Recently Demonstrated)   Recommendation to date pending progress:  [address] [place] [address]  Equipment:    Rolling Walker Bed Mobility:   Not tested  Sit to Stand:   Not tested  Transfers:   Not tested  Gait/Mobility:   Not tested     ASSESSMENT:  Ms. Max Elder has recently gotten up to the chair with OT. She is very tired and falling asleep during conversation. She did perform LE exercises as below with increased cues to stay awake and for technique. She was left up in chair with friend attending. Will continue with POC.       SUBJECTIVE:   Ms. Max Elder states, \"I'm just so tired\"    SOCIAL HISTORY/ LIVING ENVIRONMENT: See Eval  Home Environment: Private residence  One/Two Story Residence: One story  Living Alone: No  Support Systems: Spouse/Significant Other,Child(raghavendra),Friend/Neighbor  OBJECTIVE:     PAIN: VITAL SIGNS: LINES/DRAINS:   Pre Treatment:    Post Treatment: 0      O2 Device: Nasal cannula     MOBILITY: I Mod I S SBA CGA Min Mod Max Total  NT x2 Comments:   Bed Mobility    Rolling [] [] [] [] [] [] [] [] [] [x] []    Supine to Sit [] [] [] [] [] [] [] [] [] [x] []    Scooting [] [] [] [] [] [] [] [] [] [x] []    Sit to Supine [] [] [] [] [] [] [] [] [] [x] []    Transfers    Sit to Stand [] [] [] [] [] [] [] [] [] [x] []    Bed to Chair [] [] [] [] [] [] [] [] [] [x] []    Stand to Sit [] [] [] [] [] [] [] [] [] [x] []    I=Independent, Mod I=Modified Independent, S=Supervision, SBA=Standby Assistance, CGA=Contact Guard Assistance,   Min=Minimal Assistance, Mod=Moderate Assistance, Max=Maximal Assistance, Total=Total Assistance, NT=Not Tested    BALANCE: Good Fair+ Fair Fair- Poor NT Comments   Sitting Static [] [] [] [] [] [x]    Sitting Dynamic [] [] [] [] [] [x]              Standing Static [] [] [] [] [] [x]    Standing Dynamic [] [] [] [] [] [x]      GAIT: I Mod I S SBA CGA Min Mod Max Total  NT x2 Comments:   Level of Assistance [] [] [] [] [] [] [] [] [] [x] []    Distance     DME Rolling Walker    Gait Quality Heel-toe-pivot; slow     Weightbearing  Status WBAT, RLE     I=Independent, Mod I=Modified Independent, S=Supervision, SBA=Standby Assistance, CGA=Contact Guard Assistance,   Min=Minimal Assistance, Mod=Moderate Assistance, Max=Maximal Assistance, Total=Total Assistance, NT=Not Tested    PLAN:   FREQUENCY/DURATION: PT Plan of Care: BID for duration of hospital stay or until stated goals are met, whichever comes first.  TREATMENT:     TREATMENT:   ($$ Therapeutic Exercises: 8-22 mins    )  Therapeutic Exercise (15 Minutes): Therapeutic exercises noted below to improve functional activity tolerance, AROM, strength, mobility and anxiety.      TREATMENT GRID:   Date:  2/8 Date:  2/9/22 Date:     Activity/Exercise Parameters Parameters Parameters   Ankle pumps x10 B X 15 B    Heel slides x10 AAB     abduction x10 AAB X 10 B AA Glut sets x10     Quad sets x10 X 10 B    SLR x10 AAB     LAQ  X 10 B AA-R    Hip flexion  X 10 B AA          AFTER TREATMENT POSITION/PRECAUTIONS:  Chair, Needs within reach, RN notified and Visitors at bedside    INTERDISCIPLINARY COLLABORATION:  RN/PCT and PT/PTA    TOTAL TREATMENT DURATION:  PT Patient Time In/Time Out  Time In: 0345  Time Out: [id number]  [address], PTA

## 2022-02-09 NOTE — PROGRESS NOTES
Erika Tong MD  Medical Director  [address] [facility], [address]  Tel: [phone]       Physical Medicine & Rehab Consult Progress Note      Patient: Jimmy Dorado  Admit Date: 2/3/2022  Admit Diagnosis: Other fracture of right femur, initial encounter for closed fracture [facility]) [S72.8X1A]    Recommendations:   Hospital Inpatient Rehab, Continue acute rehabilitation program, Coordination of rehab / medical care, Counseling of PM&R care issues management  -SOB with some cp earlier req nitro paste. EKG with no acute changes. Trending troponins. -will cont to follow. History / Subjective / Complaint:     Patient seen and examined, interim EMR reviewed. Appears SOB in bed. Cannot lay down bc of SOB. O2 sats stable on 2L O2 per NC  Pain was beneath rib margin on the left ant chest, now left of the midline of sternum. No nausea.  No radiating pain  Allergies   Allergen Reactions    Promethazine Nausea and Vomiting and Other (comments)     Severe vomiting      Current Facility-Administered Medications   Medication Dose Route Frequency    hydrALAZINE (APRESOLINE) 20 mg/mL injection 20 mg  20 mg IntraVENous Q4H PRN    0.9% sodium chloride infusion 250 mL  250 mL IntraVENous PRN    methIMAzole (TAPAZOLE) tablet 10 mg  10 mg Oral DAILY    albuterol (PROVENTIL HFA, VENTOLIN HFA, PROAIR HFA) inhaler 2 Puff  2 Puff Inhalation Q6H PRN    levoFLOXacin (LEVAQUIN) tablet 750 mg  750 mg Oral Q24H    heparin (porcine) injection 5,000 Units  5,000 Units SubCUTAneous Q8H    nitroglycerin (NITROBID) 2 % ointment 1 Inch  1 Inch Topical BID    morphine injection 2 mg  2 mg IntraVENous Q4H PRN    acetaminophen (TYLENOL) tablet 650 mg  650 mg Oral Q8H    lidocaine 4 % patch 1 Patch  1 Patch TransDERmal Q24H    metoprolol tartrate (LOPRESSOR) tablet 50 mg  50 mg Oral Q6H    prasugreL (EFFIENT) tablet 10 mg  10 mg Oral DAILY    methylPREDNISolone (PF) (SOLU-MEDROL) injection 30 mg  30 mg IntraVENous Q8H    albuterol (PROVENTIL VENTOLIN) nebulizer solution 2.5 mg  2.5 mg Nebulization QID RT    albuterol (PROVENTIL VENTOLIN) nebulizer solution 2.5 mg  2.5 mg Nebulization Q4H PRN    albuterol-ipratropium (DUO-NEB) 2.5 MG-0.5 MG/3 ML  3 mL Nebulization Q6H PRN    guaiFENesin ER (MUCINEX) tablet 1,200 mg  1,200 mg Oral Q12H    budesonide (PULMICORT) 500 mcg/2 ml nebulizer suspension  1,000 mcg Nebulization BID RT    nitroglycerin (NITROSTAT) tablet 0.4 mg  0.4 mg SubLINGual PRN    atorvastatin (LIPITOR) tablet 80 mg  80 mg Oral QHS    aspirin delayed-release tablet 81 mg  81 mg Oral DAILY    [Held by provider] budesonide-formoteroL (SYMBICORT) 160-4.5 mcg/actuation HFA inhaler 2 Puff  2 Puff Inhalation BID RT    pantoprazole (PROTONIX) tablet 40 mg  40 mg Oral BID    tiotropium bromide (SPIRIVA RESPIMAT) 2.5 mcg /actuation  2 Puff Inhalation DAILY    alcohol 62% (NOZIN) nasal  1 Ampule  1 Ampule Topical Q12H    sodium chloride (NS) flush 5-40 mL  5-40 mL IntraVENous PRN    naloxone (NARCAN) injection 0.2-0.4 mg  0.2-0.4 mg IntraVENous Q10MIN PRN    senna-docusate (PERICOLACE) 8.6-50 mg per tablet 2 Tablet  2 Tablet Oral DAILY    ondansetron (ZOFRAN) injection 4 mg  4 mg IntraVENous Q4H PRN       Objective:     Vitals:  Patient Vitals for the past 8 hrs:   BP Temp Pulse Resp SpO2 Weight   02/09/22 0909 -- -- -- -- 93 % --   02/09/22 0805 (!) 176/104 97.9 °F (36.6 °C) 100 20 93 % --   02/09/22 0443 (!) 170/85 97.5 °F (36.4 °C) (!) 111 20 98 % 153 lb 4.8 oz (69.5 kg)   02/09/22 0433 (!) 156/91 -- 86 -- -- --   02/09/22 0427 (!) 152/80 -- 96 -- -- --   02/09/22 0425 (!) 168/103 -- -- -- -- --   02/09/22 0420 (!) 171/78 -- 99 -- -- --      Intake and Output:  02/07 1901 - 02/09 0700  In: 1037.5 [P.O.:840]  Out: 925 [Urine:925]    Physical Exam:  No significant changes    Incision(s)/Wound(s):   Incision 02/04/22 Leg Right (Active)   Dressing Status Clean;Dry; Intact 02/09/22 0400   Number of days: 5          Pain 1  Pain Scale 1: Visual (02/09/22 0855)  Pain Intensity 1: 0 (02/09/22 0855)  Patient Stated Pain Goal: 0 (02/09/22 0855)  Pain Reassessment 1: Yes (02/09/22 0855)  Pain Onset 1: acute (02/09/22 0805)  Pain Location 1: Chest (02/09/22 0805)  Pain Orientation 1: Anterior;Mid (02/09/22 0805)  Pain Description 1: Pressure; Sharp (02/09/22 0805)  Pain Intervention(s) 1: Medication (see MAR);MD notified (comment) (02/09/22 0805)     Functional Assessment:                                                  Labs/Studies:  Recent Results (from the past 67 hour(s))   TROPONIN-HIGH SENSITIVITY    Collection Time: 02/06/22  1:03 PM   Result Value Ref Range    Troponin-High Sensitivity 11,008.4 (HH) 0 - 14 pg/mL   CULTURE, BLOOD    Collection Time: 02/06/22  2:00 PM    Specimen: Blood   Result Value Ref Range    Special Requests: RIGHT  HAND        Culture result: NO GROWTH 3 DAYS     RESPIRATORY VIRUS PANEL W/COVID-19, PCR    Collection Time: 02/06/22  6:44 PM    Specimen: Nasopharyngeal   Result Value Ref Range    Adenovirus NOT DETECTED NOTDET      Coronavirus 229E NOT DETECTED NOTDET      Coronavirus HKU1 NOT DETECTED NOTDET      Coronavirus CVNL63 NOT DETECTED NOTDET      Coronavirus OC43 NOT DETECTED NOTDET      SARS-CoV-2, PCR NOT DETECTED NOTDET      Metapneumovirus NOT DETECTED NOTDET      Rhinovirus and Enterovirus NOT DETECTED NOTDET      Influenza A NOT DETECTED NOTDET      Influenza B NOT DETECTED NOTDET      Parainfluenza 1 NOT DETECTED NOTDET      Parainfluenza 2 NOT DETECTED NOTDET      Parainfluenza 3 NOT DETECTED NOTDET      Parainfluenza virus 4 NOT DETECTED NOTDET      RSV by PCR NOT DETECTED NOTDET      B. parapertussis, PCR NOT DETECTED NOTDET      Bordetella pertussis - PCR NOT DETECTED NOTDET      Chlamydophila pneumoniae DNA, QL, PCR NOT DETECTED NOTDET      Mycoplasma pneumoniae DNA, QL, PCR NOT DETECTED NOTDET     NT-PRO BNP    Collection Time: 02/06/22  6:46 PM   Result Value Ref Range    NT pro-BNP 10,504 (H) 5 - 125 PG/ML   LACTIC ACID    Collection Time: 02/06/22  6:46 PM   Result Value Ref Range    Lactic acid 2.6 (H) 0.4 - 2.0 MMOL/L   PROCALCITONIN    Collection Time: 02/06/22  6:46 PM   Result Value Ref Range    Procalcitonin 1.06 (H) 0.00 - 0.49 ng/mL   PTT    Collection Time: 02/06/22  6:46 PM   Result Value Ref Range    aPTT 28.8 24.1 - 35.1 SEC   CBC WITH AUTOMATED DIFF    Collection Time: 02/06/22  6:46 PM   Result Value Ref Range    WBC 9.5 4.3 - 11.1 K/uL    RBC 2.65 (L) 4.05 - 5.2 M/uL    HGB 7.8 (L) 11.7 - 15.4 g/dL    HCT 24.6 (L) 35.8 - 46.3 %    MCV 92.8 79.6 - 97.8 FL    MCH 29.4 26.1 - 32.9 PG    MCHC 31.7 31.4 - 35.0 g/dL    RDW 14.2 11.9 - 14.6 %    PLATELET 466 972 - 018 K/uL    MPV 9.8 9.4 - 12.3 FL    ABSOLUTE NRBC 0.00 0.0 - 0.2 K/uL    DF AUTOMATED      NEUTROPHILS 93 (H) 43 - 78 %    LYMPHOCYTES 2 (L) 13 - 44 %    MONOCYTES 3 (L) 4.0 - 12.0 %    EOSINOPHILS 2 0.5 - 7.8 %    BASOPHILS 0 0.0 - 2.0 %    IMMATURE GRANULOCYTES 0 0.0 - 5.0 %    ABS. NEUTROPHILS 8.8 (H) 1.7 - 8.2 K/UL    ABS. LYMPHOCYTES 0.2 (L) 0.5 - 4.6 K/UL    ABS. MONOCYTES 0.3 0.1 - 1.3 K/UL    ABS. EOSINOPHILS 0.2 0.0 - 0.8 K/UL    ABS. BASOPHILS 0.0 0.0 - 0.2 K/UL    ABS. IMM.  GRANS. 0.0 0.0 - 0.5 K/UL   CULTURE, BLOOD    Collection Time: 02/06/22  7:54 PM    Specimen: Blood   Result Value Ref Range    Special Requests: LEFT PICC LINE      Culture result: NO GROWTH 3 DAYS     CBC WITH AUTOMATED DIFF    Collection Time: 02/06/22  7:54 PM   Result Value Ref Range    WBC 9.2 4.3 - 11.1 K/uL    RBC 2.66 (L) 4.05 - 5.2 M/uL    HGB 7.8 (L) 11.7 - 15.4 g/dL    HCT 24.5 (L) 35.8 - 46.3 %    MCV 92.1 79.6 - 97.8 FL    MCH 29.3 26.1 - 32.9 PG    MCHC 31.8 31.4 - 35.0 g/dL    RDW 14.3 11.9 - 14.6 %    PLATELET 017 706 - 636 K/uL    MPV 10.0 9.4 - 12.3 FL    ABSOLUTE NRBC 0.00 0.0 - 0.2 K/uL    DF AUTOMATED      NEUTROPHILS 92 (H) 43 - 78 %    LYMPHOCYTES 3 (L) 13 - 44 %    MONOCYTES 3 (L) 4.0 - 12.0 %    EOSINOPHILS 2 0.5 - 7.8 %    BASOPHILS 0 0.0 - 2.0 %    IMMATURE GRANULOCYTES 0 0.0 - 5.0 %    ABS. NEUTROPHILS 8.5 (H) 1.7 - 8.2 K/UL    ABS. LYMPHOCYTES 0.3 (L) 0.5 - 4.6 K/UL    ABS. MONOCYTES 0.3 0.1 - 1.3 K/UL    ABS. EOSINOPHILS 0.2 0.0 - 0.8 K/UL    ABS. BASOPHILS 0.0 0.0 - 0.2 K/UL    ABS. IMM. GRANS. 0.0 0.0 - 0.5 K/UL   PLEASE READ & DOCUMENT PPD TEST IN 72 HRS    Collection Time: 02/07/22  3:10 AM   Result Value Ref Range    PPD Negative Negative    mm Induration 0 0 - 5 mm   METABOLIC PANEL, COMPREHENSIVE    Collection Time: 02/07/22  5:01 AM   Result Value Ref Range    Sodium 139 136 - 145 mmol/L    Potassium 3.9 3.5 - 5.1 mmol/L    Chloride 106 98 - 107 mmol/L    CO2 29 21 - 32 mmol/L    Anion gap 4 (L) 7 - 16 mmol/L    Glucose 185 (H) 65 - 100 mg/dL    BUN 11 8 - 23 MG/DL    Creatinine 0.70 0.6 - 1.0 MG/DL    GFR est AA >60 >60 ml/min/1.73m2    GFR est non-AA >60 >60 ml/min/1.73m2    Calcium 9.1 8.3 - 10.4 MG/DL    Bilirubin, total 0.3 0.2 - 1.1 MG/DL    ALT (SGPT) 29 12 - 65 U/L    AST (SGOT) 147 (H) 15 - 37 U/L    Alk. phosphatase 64 50 - 136 U/L    Protein, total 6.2 (L) 6.3 - 8.2 g/dL    Albumin 2.5 (L) 3.2 - 4.6 g/dL    Globulin 3.7 (H) 2.3 - 3.5 g/dL    A-G Ratio 0.7 (L) 1.2 - 3.5     CBC WITH AUTOMATED DIFF    Collection Time: 02/07/22  5:01 AM   Result Value Ref Range    WBC 8.9 4.3 - 11.1 K/uL    RBC 2.60 (L) 4.05 - 5.2 M/uL    HGB 7.6 (L) 11.7 - 15.4 g/dL    HCT 23.7 (L) 35.8 - 46.3 %    MCV 91.2 79.6 - 97.8 FL    MCH 29.2 26.1 - 32.9 PG    MCHC 32.1 31.4 - 35.0 g/dL    RDW 14.3 11.9 - 14.6 %    PLATELET 295 774 - 092 K/uL    MPV 10.4 9.4 - 12.3 FL    ABSOLUTE NRBC 0.00 0.0 - 0.2 K/uL    DF AUTOMATED      NEUTROPHILS 90 (H) 43 - 78 %    LYMPHOCYTES 3 (L) 13 - 44 %    MONOCYTES 4 4.0 - 12.0 %    EOSINOPHILS 3 0.5 - 7.8 %    BASOPHILS 0 0.0 - 2.0 %    IMMATURE GRANULOCYTES 0 0.0 - 5.0 %    ABS. NEUTROPHILS 7.9 1.7 - 8.2 K/UL    ABS.  LYMPHOCYTES 0.3 (L) 0.5 - 4.6 K/UL    ABS. MONOCYTES 0.4 0.1 - 1.3 K/UL    ABS. EOSINOPHILS 0.3 0.0 - 0.8 K/UL    ABS. BASOPHILS 0.0 0.0 - 0.2 K/UL    ABS. IMM.  GRANS. 0.0 0.0 - 0.5 K/UL   HEPARIN XA UFH    Collection Time: 02/07/22  5:01 AM   Result Value Ref Range    Heparin Xa UFH 0.51 0.3 - 0.7 IU/mL   POC ACTIVATED CLOTTING TIME    Collection Time: 02/07/22 10:40 AM   Result Value Ref Range    Activated Clotting Time (POC) 279 (H) 70 - 128 SECS   POC ACTIVATED CLOTTING TIME    Collection Time: 02/07/22 11:57 AM   Result Value Ref Range    Activated Clotting Time (POC) 267 (H) 70 - 128 SECS   TSH 3RD GENERATION    Collection Time: 02/07/22  5:07 PM   Result Value Ref Range    TSH 0.101 (L) 0.358 - 3.740 uIU/mL   T4, FREE    Collection Time: 02/07/22  5:07 PM   Result Value Ref Range    T4, Free 2.4 (H) 0.78 - 1.46 NG/DL   VITAMIN D, 25 HYDROXY    Collection Time: 02/07/22  5:07 PM   Result Value Ref Range    Vitamin D 25-Hydroxy 30.1 30.0 - 100.0 ng/mL   MAGNESIUM    Collection Time: 02/07/22  5:07 PM   Result Value Ref Range    Magnesium 2.1 1.6 - 2.3 mg/dL   CULTURE, RESPIRATORY/SPUTUM/BRONCH W GRAM STAIN    Collection Time: 02/08/22  2:33 AM    Specimen: Sputum   Result Value Ref Range    Special Requests: NO SPECIAL REQUESTS      GRAM STAIN 0 TO 3 WBCS PER OIF     GRAM STAIN 0 TO 1  EPITHELIAL CELLS SEEN     GRAM STAIN MODERATE GRAM POSITIVE COCCI      GRAM STAIN 4+ MUCUS PRESENT      Culture result: MODERATE NORMAL RESPIRATORY KELLY     METABOLIC PANEL, BASIC    Collection Time: 02/08/22  4:22 AM   Result Value Ref Range    Sodium 140 136 - 145 mmol/L    Potassium 4.0 3.5 - 5.1 mmol/L    Chloride 105 98 - 107 mmol/L    CO2 30 21 - 32 mmol/L    Anion gap 5 (L) 7 - 16 mmol/L    Glucose 152 (H) 65 - 100 mg/dL    BUN 18 8 - 23 MG/DL    Creatinine 0.80 0.6 - 1.0 MG/DL    GFR est AA >60 >60 ml/min/1.73m2    GFR est non-AA >60 >60 ml/min/1.73m2    Calcium 9.2 8.3 - 10.4 MG/DL   CBC W/O DIFF    Collection Time: 02/08/22  4:22 AM Result Value Ref Range    WBC 10.0 4.3 - 11.1 K/uL    RBC 2.45 (L) 4.05 - 5.2 M/uL    HGB 7.1 (L) 11.7 - 15.4 g/dL    HCT 22.8 (L) 35.8 - 46.3 %    MCV 93.1 79.6 - 97.8 FL    MCH 29.0 26.1 - 32.9 PG    MCHC 31.1 (L) 31.4 - 35.0 g/dL    RDW 14.4 11.9 - 14.6 %    PLATELET 910 842 - 582 K/uL    MPV 10.0 9.4 - 12.3 FL    ABSOLUTE NRBC 0.00 0.0 - 0.2 K/uL   HEPATIC FUNCTION PANEL    Collection Time: 02/08/22  4:22 AM   Result Value Ref Range    Protein, total 6.0 (L) 6.3 - 8.2 g/dL    Albumin 2.5 (L) 3.2 - 4.6 g/dL    Globulin 3.5 2.3 - 3.5 g/dL    A-G Ratio 0.7 (L) 1.2 - 3.5      Bilirubin, total 0.3 0.2 - 1.1 MG/DL    Bilirubin, direct <0.1 <0.4 MG/DL    Alk. phosphatase 57 50 - 136 U/L    AST (SGOT) 94 (H) 15 - 37 U/L    ALT (SGPT) 29 12 - 65 U/L   RBC, ALLOCATE    Collection Time: 02/08/22  7:45 AM   Result Value Ref Range    HISTORY CHECKED?  Historical check performed    TYPE & SCREEN    Collection Time: 02/08/22 11:36 AM   Result Value Ref Range    Crossmatch Expiration 02/11/2022,2359     ABO/Rh(D) A NEGATIVE     Antibody screen NEG     Unit number [id number]     Blood component type [facility]     Unit division 00     Status of unit TRANSFUSED     ANTIGEN/ANTIBODY INFO Jk(A) NEGATIVE,  KIMI NEGATIVE,       Crossmatch result Compatible    FERRITIN    Collection Time: 02/08/22  3:15 PM   Result Value Ref Range    Ferritin 228 8 - 388 NG/ML   TRANSFERRIN SATURATION    Collection Time: 02/08/22  3:15 PM   Result Value Ref Range    Iron 49 35 - 150 ug/dL    TIBC 235 (L) 250 - 450 ug/dL    Transferrin Saturation 21 >20 %   VITAMIN B12    Collection Time: 02/08/22  3:15 PM   Result Value Ref Range    Vitamin B12 663 193 - 986 pg/mL   FOLATE    Collection Time: 02/08/22  3:15 PM   Result Value Ref Range    Folate 16.2 3.1 - 17.5 ng/mL   HGB & HCT    Collection Time: 02/08/22 10:44 PM   Result Value Ref Range    HGB 8.9 (L) 11.7 - 15.4 g/dL    HCT 27.7 (L) 35.8 - 46.3 %   CBC WITH AUTOMATED DIFF    Collection Time: 02/09/22  3:55 AM   Result Value Ref Range    WBC 9.9 4.3 - 11.1 K/uL    RBC 3.13 (L) 4.05 - 5.2 M/uL    HGB 9.2 (L) 11.7 - 15.4 g/dL    HCT 28.1 (L) 35.8 - 46.3 %    MCV 89.8 79.6 - 97.8 FL    MCH 29.4 26.1 - 32.9 PG    MCHC 32.7 31.4 - 35.0 g/dL    RDW 15.0 (H) 11.9 - 14.6 %    PLATELET 892 049 - 797 K/uL    MPV 10.1 9.4 - 12.3 FL    ABSOLUTE NRBC 0.04 0.0 - 0.2 K/uL    DF AUTOMATED      NEUTROPHILS 89 (H) 43 - 78 %    LYMPHOCYTES 5 (L) 13 - 44 %    MONOCYTES 5 4.0 - 12.0 %    EOSINOPHILS 0 (L) 0.5 - 7.8 %    BASOPHILS 0 0.0 - 2.0 %    IMMATURE GRANULOCYTES 1 0.0 - 5.0 %    ABS. NEUTROPHILS 8.9 (H) 1.7 - 8.2 K/UL    ABS. LYMPHOCYTES 0.5 0.5 - 4.6 K/UL    ABS. MONOCYTES 0.5 0.1 - 1.3 K/UL    ABS. EOSINOPHILS 0.0 0.0 - 0.8 K/UL    ABS. BASOPHILS 0.0 0.0 - 0.2 K/UL    ABS. IMM.  GRANS. 0.1 0.0 - 0.5 K/UL   METABOLIC PANEL, BASIC    Collection Time: 02/09/22  3:55 AM   Result Value Ref Range    Sodium 137 136 - 145 mmol/L    Potassium 4.0 3.5 - 5.1 mmol/L    Chloride 103 98 - 107 mmol/L    CO2 28 21 - 32 mmol/L    Anion gap 6 (L) 7 - 16 mmol/L    Glucose 182 (H) 65 - 100 mg/dL    BUN 19 8 - 23 MG/DL    Creatinine 0.80 0.6 - 1.0 MG/DL    GFR est AA >60 >60 ml/min/1.73m2    GFR est non-AA >60 >60 ml/min/1.73m2    Calcium 9.4 8.3 - 10.4 MG/DL   EKG, 12 LEAD, INITIAL    Collection Time: 02/09/22  4:45 AM   Result Value Ref Range    Ventricular Rate 97 BPM    Atrial Rate 97 BPM    P-R Interval 150 ms    QRS Duration 78 ms    Q-T Interval 348 ms    QTC Calculation (Bezet) 441 ms    Calculated P Axis 86 degrees    Calculated R Axis 69 degrees    Calculated T Axis 92 degrees    Diagnosis       Sinus rhythm with Premature atrial complexes  Abnormal ECG  When compared with ECG of 09-FEB-2022 04:43,  Fusion complexes are no longer Present  Premature ventricular complexes are no longer Present  Premature atrial complexes are now Present  Confirmed by Ayde Beatty ([id number]) on 2/9/2022 7:05:33 AM          Assessment:     Principal Problem:    Other fracture of right femur, initial encounter for closed fracture ([address] (2/4/2022)    Active Problems:    COPD, very severe ([address] (8/20/2013)      Acute respiratory failure with hypoxia ([address] (8/22/2013)      Hyperlipidemia (10/10/2016)      Essential hypertension, benign (10/10/2016)      CAD S/P percutaneous coronary angioplasty (5/31/2019)      Pulmonary mass (2/11/2020)      Closed right hip fracture ([address] (2/4/2022)      Nonrheumatic mitral valve regurgitation (2/8/2022)      Iron deficiency anemia due to chronic blood loss (2/8/2022)        Plan / Recommendations / Medical Decision Making:     Recommendations:   Continue acute rehabilitation program  Coordination of rehab / medical care  Counseling of PM&R care issues management  Monitoring and management of medical conditions per plan of care / orders    Discussion with Family / Caregiver / Staff    Reviewed Bk Smoke / Eugune Reyna / Medications / Records

## 2022-02-09 NOTE — ROUTINE PROCESS
Bedside and Verbal shift change report given to Comfort Alcala RN (oncoming nurse) by self Lynne Holliday nurseJourdan. Report included the following information SBAR, Kardex, Intake/Output, MAR, Recent Results and Cardiac Rhythm SR/ST.

## 2022-02-09 NOTE — PROGRESS NOTES
Patient having chest pain mid sternal and under left rib 10/10, described as pressure and sharp. Dr. Nevaeh Howard notified. See MAR for medication administration.

## 2022-02-09 NOTE — PROGRESS NOTES
PT NOTE:    Will hold PT this am.  Pt has been having chest pain, being checked out by MD.  Will check back this afternoon.     Esmer Kraus, PTA

## 2022-02-09 NOTE — PROGRESS NOTES
ACUTE OT GOALS:  (Developed with and agreed upon by patient and/or caregiver.)  1. Patient will complete upper body bathing and dressing with SET UP and adaptive equipment as needed. 2. Patient will complete lower body bathing and dressing with MIN A and adaptive equipment as needed. 2. Patient will complete toileting with MIN A.   3. Patient will complete grooming ADL standing at sink with CGA. 4. Patient will tolerate 25 minutes of OT treatment with 1-2 rest breaks to increase activity tolerance for ADLs. 5. Patient will complete functional transfers with SBA and adaptive equipment as needed. 6. Patient will tolerate 10 minutes BUE exercises to increase strength for safe, functional transfers.      Timeframe: 7 visits     OCCUPATIONAL THERAPY: Daily Note OT Treatment Day # 3    Teresa Nath is a 66 y.o. female   PRIMARY DIAGNOSIS: Other fracture of right femur, initial encounter for closed fracture ([facility]  Other fracture of right femur, initial encounter for closed fracture ([facility] [S72.8X1A]  Procedure(s) (LRB):  LEFT HEART CATH / CORONARY ANGIOGRAPHY (N/A)  PERCUTANEOUS CORONARY INTERVENTION (N/A)  2 Days Post-Op  [place] / [place] / Product Type: Medicare /   ASSESSMENT:     REHAB RECOMMENDATIONS: CURRENT LEVEL OF FUNCTION:  (Most Recently Demonstrated)   Recommendation to date pending progress:  Setting:   Short-term Rehab  Equipment:    To Be Determined Bathing:   Not tested  Dressing:   Not tested  Feeding/Grooming:   Set Up  Toileting:   Not tested  Functional Mobility:   Minimal Assistance x RW     ASSESSMENT:  Ms. Jean Claude Yost continues to present with deficits in overall strength, activity tolerance, ADL performance, and functional mobility s/p R hip fracture. WBAT in RLE. Presents supine in bed upon arrival. Patient very anxious about her breathing despite being on 2L 02 with Sp02 WFL.  Very slow moving today and particular on how she wants to move. Min to mod A for bed mobility; Fair + EOB sitting balance. Worked a lot on pursed lip breathing and relaxation techniques while sitting EOB. Min A x RW for steps to chair. Set-up for self-grooming and feeding tasks today. Progressing slowly towards goals. Will continue OT efforts as indicated. SUBJECTIVE:   Ms. Isa Reynolds states, \"I need to concentrate.  \"    SOCIAL HISTORY/LIVING ENVIRONMENT:   Home Environment: Private residence  One/Two Story Residence: One story  Living Alone: No  Support Systems: Spouse/Significant Other,Child(raghavendra),Friend/Neighbor    OBJECTIVE:     PAIN: VITAL SIGNS: LINES/DRAINS:   Pre Treatment: Pain Screen  Pain Scale 1: Numeric (0 - 10)  Pain Intensity 1: 0  Post Treatment: 0   none  O2 Device: Nasal cannula     ACTIVITIES OF DAILY LIVING: I Mod I S SBA CGA Min Mod Max Total NT Comments   BASIC ADLs:              Bathing/ Showering [] [] [] [] [] [] [] [] [] [x]    Toileting [] [] [] [] [] [] [] [] [] [x]    Dressing [] [] [] [] [] [] [] [] [] [x]    Feeding [] [] [] [] [] [] [] [] [] [x]    Grooming [] [] [x] [] [] [] [] [] [] []    Personal Device Care [] [] [] [] [] [] [] [] [] []    Functional Mobility [] [] [] [] [] [x] [] [] [] []  x RW   I=Independent, Mod I=Modified Independent, S=Supervision, SBA=Standby Assistance, CGA=Contact Guard Assistance,   Min=Minimal Assistance, Mod=Moderate Assistance, Max=Maximal Assistance, Total=Total Assistance, NT=Not Tested    MOBILITY: I Mod I S SBA CGA Min Mod Max Total  NT x2 Comments:   Supine to sit [] [] [] [] [] [x] [x] [] [] [] []    Sit to supine [] [] [] [] [] [] [] [] [] [x] []    Sit to stand [] [] [] [] [] [x] [x] [] [] [] []    Bed to chair [] [] [] [] [] [x] [] [] [] [] [] X RW   I=Independent, Mod I=Modified Independent, S=Supervision, SBA=Standby Assistance, CGA=Contact Guard Assistance,   Min=Minimal Assistance, Mod=Moderate Assistance, Max=Maximal Assistance, Total=Total Assistance, NT=Not Tested    BALANCE: Good Fair+ Fair Fair- Poor NT Comments   Sitting Static [x] [] [] [] [] []    Sitting Dynamic [] [x] [] [] [] []              Standing Static [] [] [x] [] [] []    Standing Dynamic [] [] [x] [] [] []      PLAN:   FREQUENCY/DURATION: OT Plan of Care: 5 times/week for duration of hospital stay or until stated goals are met, whichever comes first.    TREATMENT:   TREATMENT:   ( $$ Therapeutic Activity: 23-37 mins   )  Co-Treatment PT/OT necessary due to patient's decreased overall endurance/tolerance levels, as well as need for high level skilled assistance to complete functional transfers/mobility and functional tasks  Therapeutic Activity (23 Minutes): Therapeutic activity included Rolling, Supine to Sit, Sit to Supine, Scooting, Transfer Training, Ambulation on level ground, Sitting balance  and Standing balance to improve functional Mobility, Strength and Activity tolerance.     TREATMENT GRID:  N/A    AFTER TREATMENT POSITION/PRECAUTIONS:  Chair, Needs within reach and RN notified    INTERDISCIPLINARY COLLABORATION:  RN/PCT, PT/PTA and OT/YOUNG    TOTAL TREATMENT DURATION:  OT Patient Time In/Time Out  Time In: 7478  Time Out: [address], OT

## 2022-02-09 NOTE — PROGRESS NOTES
[facility] [[email or web address]] CARDIOLOGY PROGRESS NOTE           2/9/2022 8:23 AM    Admit Date: 2/3/2022      Subjective:   She does not feel well. Lots of pain initially low substernal then now has settled left lower chest mid axillary line. Objective:      Vitals:    02/09/22 0425 02/09/22 0427 02/09/22 0433 02/09/22 0443   BP: (!) 168/103 (!) 152/80 (!) 156/91 (!) 170/85   Pulse:  96 86 (!) 111   Resp:    20   Temp:    97.5 °F (36.4 °C)   SpO2:    98%   Weight:    153 lb 4.8 oz (69.5 kg)   Height:           Physical Exam:  General- in distress from pain  Neck- supple, no JVD  CV- regular rate and rhythm no MRG  Lung- no wheezes, +tachypnea  Abd- soft, nontender, nondistended  Ext- no edema bilaterally. Skin- warm and dry    Data Review:   Recent Labs     02/09/22  0355 02/08/22 2244 02/08/22 0422 02/08/22 0422     --   --  140   K 4.0  --   --  4.0   BUN 19  --   --  18   CREA 0.80  --   --  0.80   *  --   --  152*   WBC 9.9  --   --  10.0   HGB 9.2* 8.9*   < > 7.1*   HCT 28.1* 27.7*   < > 22.8*     --   --  214    < > = values in this interval not displayed. Ekg: No acute change    Assessment/Plan:     Principal Problem:    Other fracture of right femur, initial encounter for closed fracture ([address] (2/4/2022)        Active Problems:    COPD, very severe ([address] (8/20/2013)          Acute respiratory failure with hypoxia ([address] (8/22/2013)          Hyperlipidemia (10/10/2016)          Essential hypertension, benign (10/10/2016)          CAD S/P percutaneous coronary angioplasty (5/31/2019)          Pulmonary mass (2/11/2020)          Closed right hip fracture ([address] (2/4/2022)          Nonrheumatic mitral valve regurgitation (2/8/2022)          Iron deficiency anemia due to chronic blood loss (2/8/2022)      ////    She is in a lot of pain, I doubt cardiac but is possible. I called her nurse and we discussed pain management. Will repeat cardiac biomarkers.           [address], MD  2/9/2022 8:23 AM

## 2022-02-09 NOTE — PROGRESS NOTES
Follow up. Reviewed CXR (no acute pathology), BNP (doubled from prior), troponin (down from prior)    Will defer to cardiology on lasix and the above results.   I told nursing this just recently

## 2022-02-10 ENCOUNTER — HOSPITAL ENCOUNTER (INPATIENT)
Age: 72
LOS: 15 days | Discharge: HOME HEALTH | DRG: 560 | End: 2022-02-25
Attending: PHYSICAL MEDICINE & REHABILITATION | Admitting: PHYSICAL MEDICINE & REHABILITATION
Payer: MEDICARE

## 2022-02-10 VITALS
WEIGHT: 155.4 LBS | HEIGHT: 68 IN | BODY MASS INDEX: 23.55 KG/M2 | HEART RATE: 76 BPM | DIASTOLIC BLOOD PRESSURE: 66 MMHG | SYSTOLIC BLOOD PRESSURE: 112 MMHG | OXYGEN SATURATION: 93 % | RESPIRATION RATE: 20 BRPM | TEMPERATURE: 97.5 F

## 2022-02-10 DIAGNOSIS — Z98.61 CAD S/P PERCUTANEOUS CORONARY ANGIOPLASTY: ICD-10-CM

## 2022-02-10 DIAGNOSIS — E78.2 MIXED HYPERLIPIDEMIA: Chronic | ICD-10-CM

## 2022-02-10 DIAGNOSIS — S72.001A CLOSED FRACTURE OF RIGHT HIP, INITIAL ENCOUNTER (HCC): ICD-10-CM

## 2022-02-10 DIAGNOSIS — J96.11 CHRONIC RESPIRATORY FAILURE WITH HYPOXIA (HCC): Chronic | ICD-10-CM

## 2022-02-10 DIAGNOSIS — S72.001S CLOSED FRACTURE OF RIGHT HIP, SEQUELA: ICD-10-CM

## 2022-02-10 DIAGNOSIS — J96.01 ACUTE RESPIRATORY FAILURE WITH HYPOXIA (HCC): ICD-10-CM

## 2022-02-10 DIAGNOSIS — J43.2 CENTRILOBULAR EMPHYSEMA (HCC): ICD-10-CM

## 2022-02-10 DIAGNOSIS — R07.89 OTHER CHEST PAIN: ICD-10-CM

## 2022-02-10 DIAGNOSIS — S72.91XA CLOSED FRACTURE OF RIGHT FEMUR, UNSPECIFIED FRACTURE MORPHOLOGY, UNSPECIFIED PORTION OF FEMUR, INITIAL ENCOUNTER (HCC): Primary | ICD-10-CM

## 2022-02-10 DIAGNOSIS — I20.8 STABLE ANGINA PECTORIS (HCC): ICD-10-CM

## 2022-02-10 DIAGNOSIS — I25.10 CAD S/P PERCUTANEOUS CORONARY ANGIOPLASTY: ICD-10-CM

## 2022-02-10 DIAGNOSIS — M54.50 ACUTE BILATERAL LOW BACK PAIN WITHOUT SCIATICA: ICD-10-CM

## 2022-02-10 DIAGNOSIS — I25.10 CORONARY ARTERY DISEASE INVOLVING NATIVE CORONARY ARTERY OF NATIVE HEART WITHOUT ANGINA PECTORIS: ICD-10-CM

## 2022-02-10 DIAGNOSIS — F41.9 CHRONIC ANXIETY: ICD-10-CM

## 2022-02-10 PROBLEM — I34.0 MODERATE TO SEVERE MITRAL REGURGITATION: Status: ACTIVE | Noted: 2022-02-08

## 2022-02-10 PROBLEM — J44.0 COPD WITH ACUTE LOWER RESPIRATORY INFECTION (HCC): Status: ACTIVE | Noted: 2022-02-10

## 2022-02-10 LAB
ANION GAP SERPL CALC-SCNC: 1 MMOL/L (ref 7–16)
ATRIAL RATE: 84 BPM
BACTERIA SPEC CULT: NORMAL
BASOPHILS # BLD: 0 K/UL (ref 0–0.2)
BASOPHILS NFR BLD: 0 % (ref 0–2)
BUN SERPL-MCNC: 24 MG/DL (ref 8–23)
CALCIUM SERPL-MCNC: 9 MG/DL (ref 8.3–10.4)
CALCULATED P AXIS, ECG09: 83 DEGREES
CALCULATED R AXIS, ECG10: 71 DEGREES
CALCULATED T AXIS, ECG11: 90 DEGREES
CHLORIDE SERPL-SCNC: 102 MMOL/L (ref 98–107)
CO2 SERPL-SCNC: 32 MMOL/L (ref 21–32)
COVID-19 RAPID TEST, COVR: NOT DETECTED
CREAT SERPL-MCNC: 0.8 MG/DL (ref 0.6–1)
DIAGNOSIS, 93000: NORMAL
DIFFERENTIAL METHOD BLD: ABNORMAL
EOSINOPHIL # BLD: 0 K/UL (ref 0–0.8)
EOSINOPHIL NFR BLD: 0 % (ref 0.5–7.8)
ERYTHROCYTE [DISTWIDTH] IN BLOOD BY AUTOMATED COUNT: 14.9 % (ref 11.9–14.6)
GLUCOSE SERPL-MCNC: 121 MG/DL (ref 65–100)
GRAM STN SPEC: NORMAL
HCT VFR BLD AUTO: 28.1 % (ref 35.8–46.3)
HGB BLD-MCNC: 8.8 G/DL (ref 11.7–15.4)
IMM GRANULOCYTES # BLD AUTO: 0.1 K/UL (ref 0–0.5)
IMM GRANULOCYTES NFR BLD AUTO: 1 % (ref 0–5)
LYMPHOCYTES # BLD: 0.5 K/UL (ref 0.5–4.6)
LYMPHOCYTES NFR BLD: 6 % (ref 13–44)
MCH RBC QN AUTO: 28.9 PG (ref 26.1–32.9)
MCHC RBC AUTO-ENTMCNC: 31.3 G/DL (ref 31.4–35)
MCV RBC AUTO: 92.1 FL (ref 79.6–97.8)
MONOCYTES # BLD: 0.8 K/UL (ref 0.1–1.3)
MONOCYTES NFR BLD: 9 % (ref 4–12)
NEUTS SEG # BLD: 6.7 K/UL (ref 1.7–8.2)
NEUTS SEG NFR BLD: 83 % (ref 43–78)
NRBC # BLD: 0.07 K/UL (ref 0–0.2)
P-R INTERVAL, ECG05: 146 MS
PLATELET # BLD AUTO: 252 K/UL (ref 150–450)
PMV BLD AUTO: 10.1 FL (ref 9.4–12.3)
POTASSIUM SERPL-SCNC: 4.3 MMOL/L (ref 3.5–5.1)
Q-T INTERVAL, ECG07: 380 MS
QRS DURATION, ECG06: 82 MS
QTC CALCULATION (BEZET), ECG08: 449 MS
RBC # BLD AUTO: 3.05 M/UL (ref 4.05–5.2)
SERVICE CMNT-IMP: NORMAL
SODIUM SERPL-SCNC: 135 MMOL/L (ref 136–145)
SOURCE, COVRS: NORMAL
VENTRICULAR RATE, ECG03: 84 BPM
WBC # BLD AUTO: 8 K/UL (ref 4.3–11.1)

## 2022-02-10 PROCEDURE — 93010 ELECTROCARDIOGRAM REPORT: CPT | Performed by: INTERNAL MEDICINE

## 2022-02-10 PROCEDURE — 74011250636 HC RX REV CODE- 250/636: Performed by: INTERNAL MEDICINE

## 2022-02-10 PROCEDURE — 74011250637 HC RX REV CODE- 250/637: Performed by: INTERNAL MEDICINE

## 2022-02-10 PROCEDURE — 94640 AIRWAY INHALATION TREATMENT: CPT

## 2022-02-10 PROCEDURE — 97110 THERAPEUTIC EXERCISES: CPT

## 2022-02-10 PROCEDURE — 99223 1ST HOSP IP/OBS HIGH 75: CPT | Performed by: PHYSICAL MEDICINE & REHABILITATION

## 2022-02-10 PROCEDURE — 74011000250 HC RX REV CODE- 250: Performed by: INTERNAL MEDICINE

## 2022-02-10 PROCEDURE — 97530 THERAPEUTIC ACTIVITIES: CPT

## 2022-02-10 PROCEDURE — 93005 ELECTROCARDIOGRAM TRACING: CPT | Performed by: INTERNAL MEDICINE

## 2022-02-10 PROCEDURE — 94760 N-INVAS EAR/PLS OXIMETRY 1: CPT

## 2022-02-10 PROCEDURE — 74011250637 HC RX REV CODE- 250/637: Performed by: PHYSICAL MEDICINE & REHABILITATION

## 2022-02-10 PROCEDURE — 2709999900 HC NON-CHARGEABLE SUPPLY

## 2022-02-10 PROCEDURE — 99232 SBSQ HOSP IP/OBS MODERATE 35: CPT | Performed by: INTERNAL MEDICINE

## 2022-02-10 PROCEDURE — 77010033678 HC OXYGEN DAILY

## 2022-02-10 PROCEDURE — 85025 COMPLETE CBC W/AUTO DIFF WBC: CPT

## 2022-02-10 PROCEDURE — 80048 BASIC METABOLIC PNL TOTAL CA: CPT

## 2022-02-10 PROCEDURE — 87635 SARS-COV-2 COVID-19 AMP PRB: CPT

## 2022-02-10 PROCEDURE — 65310000000 HC RM PRIVATE REHAB

## 2022-02-10 PROCEDURE — 74011636637 HC RX REV CODE- 636/637: Performed by: INTERNAL MEDICINE

## 2022-02-10 RX ORDER — METOPROLOL TARTRATE 50 MG/1
50 TABLET ORAL EVERY 6 HOURS
Status: DISCONTINUED | OUTPATIENT
Start: 2022-02-11 | End: 2022-02-23

## 2022-02-10 RX ORDER — ALBUTEROL SULFATE 90 UG/1
2 AEROSOL, METERED RESPIRATORY (INHALATION)
Status: DISCONTINUED | OUTPATIENT
Start: 2022-02-10 | End: 2022-02-25 | Stop reason: HOSPADM

## 2022-02-10 RX ORDER — PRASUGREL 10 MG/1
10 TABLET, FILM COATED ORAL DAILY
Status: CANCELLED | OUTPATIENT
Start: 2022-02-11

## 2022-02-10 RX ORDER — NITROGLYCERIN 0.4 MG/1
0.4 TABLET SUBLINGUAL AS NEEDED
Status: DISCONTINUED | OUTPATIENT
Start: 2022-02-10 | End: 2022-02-25 | Stop reason: HOSPADM

## 2022-02-10 RX ORDER — GUAIFENESIN 600 MG/1
1200 TABLET, EXTENDED RELEASE ORAL EVERY 12 HOURS
Status: DISCONTINUED | OUTPATIENT
Start: 2022-02-10 | End: 2022-02-25 | Stop reason: HOSPADM

## 2022-02-10 RX ORDER — ACETAMINOPHEN 325 MG/1
650 TABLET ORAL EVERY 8 HOURS
Status: DISCONTINUED | OUTPATIENT
Start: 2022-02-10 | End: 2022-02-25 | Stop reason: HOSPADM

## 2022-02-10 RX ORDER — ASPIRIN 81 MG/1
81 TABLET ORAL DAILY
Status: CANCELLED | OUTPATIENT
Start: 2022-02-11

## 2022-02-10 RX ORDER — NITROGLYCERIN 0.4 MG/1
0.4 TABLET SUBLINGUAL AS NEEDED
Status: CANCELLED | OUTPATIENT
Start: 2022-02-10

## 2022-02-10 RX ORDER — ALBUTEROL SULFATE 0.83 MG/ML
2.5 SOLUTION RESPIRATORY (INHALATION)
Status: CANCELLED | OUTPATIENT
Start: 2022-02-10

## 2022-02-10 RX ORDER — METHIMAZOLE 5 MG/1
10 TABLET ORAL DAILY
Status: DISCONTINUED | OUTPATIENT
Start: 2022-02-11 | End: 2022-02-25 | Stop reason: HOSPADM

## 2022-02-10 RX ORDER — PREDNISONE 20 MG/1
40 TABLET ORAL
Status: CANCELLED | OUTPATIENT
Start: 2022-02-11

## 2022-02-10 RX ORDER — GUAIFENESIN 600 MG/1
1200 TABLET, EXTENDED RELEASE ORAL EVERY 12 HOURS
Status: CANCELLED | OUTPATIENT
Start: 2022-02-10

## 2022-02-10 RX ORDER — POLYETHYLENE GLYCOL 3350 17 G/17G
17 POWDER, FOR SOLUTION ORAL DAILY
Status: DISCONTINUED | OUTPATIENT
Start: 2022-02-11 | End: 2022-02-25 | Stop reason: HOSPADM

## 2022-02-10 RX ORDER — HEPARIN SODIUM 5000 [USP'U]/ML
5000 INJECTION, SOLUTION INTRAVENOUS; SUBCUTANEOUS EVERY 8 HOURS
Status: CANCELLED | OUTPATIENT
Start: 2022-02-10

## 2022-02-10 RX ORDER — SODIUM CHLORIDE 9 MG/ML
250 INJECTION, SOLUTION INTRAVENOUS AS NEEDED
Status: CANCELLED | OUTPATIENT
Start: 2022-02-10

## 2022-02-10 RX ORDER — METHIMAZOLE 5 MG/1
10 TABLET ORAL DAILY
Status: CANCELLED | OUTPATIENT
Start: 2022-02-11

## 2022-02-10 RX ORDER — LIDOCAINE 4 G/100G
1 PATCH TOPICAL EVERY 24 HOURS
Status: DISCONTINUED | OUTPATIENT
Start: 2022-02-11 | End: 2022-02-25 | Stop reason: HOSPADM

## 2022-02-10 RX ORDER — LIDOCAINE 4 G/100G
1 PATCH TOPICAL EVERY 24 HOURS
Status: CANCELLED | OUTPATIENT
Start: 2022-02-11

## 2022-02-10 RX ORDER — SODIUM CHLORIDE 0.9 % (FLUSH) 0.9 %
5-40 SYRINGE (ML) INJECTION AS NEEDED
Status: DISCONTINUED | OUTPATIENT
Start: 2022-02-10 | End: 2022-02-25 | Stop reason: HOSPADM

## 2022-02-10 RX ORDER — ALBUTEROL SULFATE 0.83 MG/ML
2.5 SOLUTION RESPIRATORY (INHALATION)
Status: DISCONTINUED | OUTPATIENT
Start: 2022-02-10 | End: 2022-02-11

## 2022-02-10 RX ORDER — PRASUGREL 10 MG/1
10 TABLET, FILM COATED ORAL DAILY
Qty: 30 TABLET | Refills: 1 | Status: ON HOLD
Start: 2022-02-11 | End: 2022-02-24 | Stop reason: SDUPTHER

## 2022-02-10 RX ORDER — FACIAL-BODY WIPES
10 EACH TOPICAL DAILY PRN
Status: DISCONTINUED | OUTPATIENT
Start: 2022-02-10 | End: 2022-02-25 | Stop reason: HOSPADM

## 2022-02-10 RX ORDER — LEVOFLOXACIN 750 MG/1
750 TABLET ORAL EVERY 24 HOURS
Qty: 3 TABLET | Refills: 0 | Status: SHIPPED
Start: 2022-02-10 | End: 2022-02-25

## 2022-02-10 RX ORDER — METHIMAZOLE 10 MG/1
10 TABLET ORAL DAILY
Qty: 30 TABLET | Refills: 1 | Status: ON HOLD
Start: 2022-02-11 | End: 2022-02-24 | Stop reason: SDUPTHER

## 2022-02-10 RX ORDER — AMOXICILLIN 250 MG
2 CAPSULE ORAL DAILY
Status: DISCONTINUED | OUTPATIENT
Start: 2022-02-11 | End: 2022-02-25 | Stop reason: HOSPADM

## 2022-02-10 RX ORDER — METOPROLOL TARTRATE 50 MG/1
50 TABLET ORAL EVERY 6 HOURS
Status: CANCELLED | OUTPATIENT
Start: 2022-02-10

## 2022-02-10 RX ORDER — ASPIRIN 81 MG/1
81 TABLET ORAL DAILY
Status: DISCONTINUED | OUTPATIENT
Start: 2022-02-11 | End: 2022-02-25 | Stop reason: HOSPADM

## 2022-02-10 RX ORDER — OXYCODONE HYDROCHLORIDE 5 MG/1
10 TABLET ORAL
Status: DISCONTINUED | OUTPATIENT
Start: 2022-02-10 | End: 2022-02-10 | Stop reason: HOSPADM

## 2022-02-10 RX ORDER — TRAZODONE HYDROCHLORIDE 50 MG/1
25 TABLET ORAL
Status: DISCONTINUED | OUTPATIENT
Start: 2022-02-10 | End: 2022-02-25 | Stop reason: HOSPADM

## 2022-02-10 RX ORDER — PREDNISONE 20 MG/1
40 TABLET ORAL
Status: DISCONTINUED | OUTPATIENT
Start: 2022-02-11 | End: 2022-02-18

## 2022-02-10 RX ORDER — AMOXICILLIN 250 MG
1 CAPSULE ORAL
Status: CANCELLED | OUTPATIENT
Start: 2022-02-10

## 2022-02-10 RX ORDER — HEPARIN SODIUM 5000 [USP'U]/ML
5000 INJECTION, SOLUTION INTRAVENOUS; SUBCUTANEOUS EVERY 8 HOURS
Status: DISCONTINUED | OUTPATIENT
Start: 2022-02-11 | End: 2022-02-11

## 2022-02-10 RX ORDER — ONDANSETRON 4 MG/1
4 TABLET, ORALLY DISINTEGRATING ORAL
Status: DISCONTINUED | OUTPATIENT
Start: 2022-02-10 | End: 2022-02-25 | Stop reason: HOSPADM

## 2022-02-10 RX ORDER — ATORVASTATIN CALCIUM 80 MG/1
80 TABLET, FILM COATED ORAL
Status: DISCONTINUED | OUTPATIENT
Start: 2022-02-10 | End: 2022-02-25 | Stop reason: HOSPADM

## 2022-02-10 RX ORDER — PREDNISONE 20 MG/1
TABLET ORAL
Qty: 10 TABLET | Refills: 0 | Status: SHIPPED
Start: 2022-02-10 | End: 2022-02-25

## 2022-02-10 RX ORDER — ATORVASTATIN CALCIUM 80 MG/1
80 TABLET, FILM COATED ORAL
Status: CANCELLED | OUTPATIENT
Start: 2022-02-10

## 2022-02-10 RX ORDER — PRASUGREL 10 MG/1
10 TABLET, FILM COATED ORAL DAILY
Status: DISCONTINUED | OUTPATIENT
Start: 2022-02-11 | End: 2022-02-25 | Stop reason: HOSPADM

## 2022-02-10 RX ORDER — PANTOPRAZOLE SODIUM 40 MG/1
40 TABLET, DELAYED RELEASE ORAL 2 TIMES DAILY
Status: CANCELLED | OUTPATIENT
Start: 2022-02-10

## 2022-02-10 RX ORDER — PANTOPRAZOLE SODIUM 40 MG/1
40 TABLET, DELAYED RELEASE ORAL 2 TIMES DAILY
Status: DISCONTINUED | OUTPATIENT
Start: 2022-02-11 | End: 2022-02-25 | Stop reason: HOSPADM

## 2022-02-10 RX ORDER — FUROSEMIDE 40 MG/1
40 TABLET ORAL ONCE
Status: DISCONTINUED | OUTPATIENT
Start: 2022-02-10 | End: 2022-02-10

## 2022-02-10 RX ORDER — ALBUTEROL SULFATE 90 UG/1
2 AEROSOL, METERED RESPIRATORY (INHALATION)
Status: CANCELLED | OUTPATIENT
Start: 2022-02-10

## 2022-02-10 RX ORDER — LORAZEPAM 0.5 MG/1
0.5 TABLET ORAL
Status: CANCELLED | OUTPATIENT
Start: 2022-02-10

## 2022-02-10 RX ORDER — SODIUM CHLORIDE 9 MG/ML
250 INJECTION, SOLUTION INTRAVENOUS AS NEEDED
Status: DISCONTINUED | OUTPATIENT
Start: 2022-02-10 | End: 2022-02-25 | Stop reason: HOSPADM

## 2022-02-10 RX ORDER — LORAZEPAM 0.5 MG/1
0.5 TABLET ORAL
Status: DISCONTINUED | OUTPATIENT
Start: 2022-02-10 | End: 2022-02-25 | Stop reason: HOSPADM

## 2022-02-10 RX ORDER — FACIAL-BODY WIPES
10 EACH TOPICAL DAILY PRN
Status: CANCELLED | OUTPATIENT
Start: 2022-02-10

## 2022-02-10 RX ORDER — AMOXICILLIN 250 MG
1 CAPSULE ORAL
Status: DISCONTINUED | OUTPATIENT
Start: 2022-02-10 | End: 2022-02-25 | Stop reason: HOSPADM

## 2022-02-10 RX ORDER — OXYCODONE HYDROCHLORIDE 5 MG/1
10 TABLET ORAL
Status: DISCONTINUED | OUTPATIENT
Start: 2022-02-10 | End: 2022-02-11

## 2022-02-10 RX ORDER — ACETAMINOPHEN 325 MG/1
650 TABLET ORAL EVERY 8 HOURS
Status: CANCELLED | OUTPATIENT
Start: 2022-02-10

## 2022-02-10 RX ORDER — TRAZODONE HYDROCHLORIDE 50 MG/1
25 TABLET ORAL
Status: CANCELLED | OUTPATIENT
Start: 2022-02-10

## 2022-02-10 RX ORDER — IPRATROPIUM BROMIDE AND ALBUTEROL SULFATE 2.5; .5 MG/3ML; MG/3ML
3 SOLUTION RESPIRATORY (INHALATION)
Status: DISCONTINUED | OUTPATIENT
Start: 2022-02-10 | End: 2022-02-25 | Stop reason: HOSPADM

## 2022-02-10 RX ORDER — POLYETHYLENE GLYCOL 3350 17 G/17G
17 POWDER, FOR SOLUTION ORAL DAILY
Status: CANCELLED | OUTPATIENT
Start: 2022-02-11

## 2022-02-10 RX ORDER — SODIUM CHLORIDE 0.9 % (FLUSH) 0.9 %
5-40 SYRINGE (ML) INJECTION AS NEEDED
Status: CANCELLED | OUTPATIENT
Start: 2022-02-10

## 2022-02-10 RX ORDER — IPRATROPIUM BROMIDE AND ALBUTEROL SULFATE 2.5; .5 MG/3ML; MG/3ML
3 SOLUTION RESPIRATORY (INHALATION)
Status: CANCELLED | OUTPATIENT
Start: 2022-02-10

## 2022-02-10 RX ORDER — ONDANSETRON 8 MG/1
4 TABLET, ORALLY DISINTEGRATING ORAL
Status: CANCELLED | OUTPATIENT
Start: 2022-02-10

## 2022-02-10 RX ORDER — AMOXICILLIN 250 MG
2 CAPSULE ORAL DAILY
Status: CANCELLED | OUTPATIENT
Start: 2022-02-11

## 2022-02-10 RX ADMIN — LISINOPRIL 10 MG: 5 TABLET ORAL at 08:04

## 2022-02-10 RX ADMIN — PANTOPRAZOLE SODIUM 40 MG: 40 TABLET, DELAYED RELEASE ORAL at 08:04

## 2022-02-10 RX ADMIN — ASPIRIN 81 MG: 81 TABLET ORAL at 08:04

## 2022-02-10 RX ADMIN — PRASUGREL 10 MG: 10 TABLET, FILM COATED ORAL at 09:00

## 2022-02-10 RX ADMIN — ACETAMINOPHEN 650 MG: 325 TABLET ORAL at 14:12

## 2022-02-10 RX ADMIN — METOPROLOL TARTRATE 50 MG: 50 TABLET, FILM COATED ORAL at 14:03

## 2022-02-10 RX ADMIN — GUAIFENESIN 1200 MG: 600 TABLET ORAL at 08:04

## 2022-02-10 RX ADMIN — ACETAMINOPHEN 650 MG: 325 TABLET ORAL at 21:32

## 2022-02-10 RX ADMIN — GUAIFENESIN 1200 MG: 600 TABLET ORAL at 21:32

## 2022-02-10 RX ADMIN — LEVOFLOXACIN 750 MG: 500 TABLET, FILM COATED ORAL at 17:28

## 2022-02-10 RX ADMIN — ACETAMINOPHEN 650 MG: 325 TABLET ORAL at 05:20

## 2022-02-10 RX ADMIN — ATORVASTATIN CALCIUM 80 MG: 80 TABLET, FILM COATED ORAL at 21:31

## 2022-02-10 RX ADMIN — BUDESONIDE 1000 MCG: 0.5 INHALANT RESPIRATORY (INHALATION) at 09:03

## 2022-02-10 RX ADMIN — SODIUM CHLORIDE, PRESERVATIVE FREE 10 ML: 5 INJECTION INTRAVENOUS at 05:21

## 2022-02-10 RX ADMIN — HEPARIN SODIUM 5000 UNITS: 5000 INJECTION INTRAVENOUS; SUBCUTANEOUS at 08:02

## 2022-02-10 RX ADMIN — METHIMAZOLE 10 MG: 5 TABLET ORAL at 08:03

## 2022-02-10 RX ADMIN — Medication 1 AMPULE: at 08:03

## 2022-02-10 RX ADMIN — NITROGLYCERIN 1 INCH: 20 OINTMENT TOPICAL at 08:03

## 2022-02-10 RX ADMIN — HEPARIN SODIUM 5000 UNITS: 5000 INJECTION INTRAVENOUS; SUBCUTANEOUS at 17:28

## 2022-02-10 RX ADMIN — ONDANSETRON 4 MG: 2 INJECTION INTRAMUSCULAR; INTRAVENOUS at 10:21

## 2022-02-10 RX ADMIN — PANTOPRAZOLE SODIUM 40 MG: 40 TABLET, DELAYED RELEASE ORAL at 17:28

## 2022-02-10 RX ADMIN — MORPHINE SULFATE 2 MG: 4 INJECTION INTRAVENOUS at 09:22

## 2022-02-10 RX ADMIN — ALBUTEROL SULFATE 2.5 MG: 2.5 SOLUTION RESPIRATORY (INHALATION) at 09:03

## 2022-02-10 RX ADMIN — METOPROLOL TARTRATE 50 MG: 50 TABLET, FILM COATED ORAL at 05:20

## 2022-02-10 RX ADMIN — PREDNISONE 40 MG: 20 TABLET ORAL at 14:04

## 2022-02-10 RX ADMIN — SENNOSIDES AND DOCUSATE SODIUM 2 TABLET: 8.6; 5 TABLET ORAL at 08:04

## 2022-02-10 RX ADMIN — LORAZEPAM 0.5 MG: 0.5 TABLET ORAL at 21:32

## 2022-02-10 RX ADMIN — ALBUTEROL SULFATE 2 PUFF: 90 AEROSOL, METERED RESPIRATORY (INHALATION) at 21:41

## 2022-02-10 RX ADMIN — Medication 1 AMPULE: at 21:32

## 2022-02-10 RX ADMIN — SODIUM CHLORIDE, PRESERVATIVE FREE 10 ML: 5 INJECTION INTRAVENOUS at 13:17

## 2022-02-10 NOTE — ROUTINE PROCESS
Gave report to Shahbaz Irizarry RN on 9th floor. Will put transport in for patient to be taken to 9th.

## 2022-02-10 NOTE — ROUTINE PROCESS
Bedside shift change report given to Dl Garcia RN (oncoming nurse) by Bud Cummings RN (offgoing nurse). Report included the following information SBAR, Kardex, ED Summary, Procedure Summary, Intake/Output, MAR and Recent Results.

## 2022-02-10 NOTE — PROGRESS NOTES
Problem: Falls - Risk of  Goal: *Absence of Falls  Description: Document Leila Serrano Fall Risk and appropriate interventions in the flowsheet. Outcome: Progressing Towards Goal  Note: Fall Risk Interventions:  Mobility Interventions: Bed/chair exit alarm,Communicate number of staff needed for ambulation/transfer,Patient to call before getting OOB,PT Consult for mobility concerns,PT Consult for assist device competence,Utilize walker, cane, or other assistive device         Medication Interventions: Bed/chair exit alarm,Evaluate medications/consider consulting pharmacy,Patient to call before getting OOB,Teach patient to arise slowly    Elimination Interventions: Bed/chair exit alarm,Call light in reach,Patient to call for help with toileting needs,Toilet paper/wipes in reach    History of Falls Interventions: Bed/chair exit alarm,Door open when patient unattended,Evaluate medications/consider consulting pharmacy         Problem: Patient Education: Go to Patient Education Activity  Goal: Patient/Family Education  Outcome: Progressing Towards Goal     Problem: Falls - Risk of  Goal: *Absence of Falls  Description: Document Leila Serrano Fall Risk and appropriate interventions in the flowsheet.   Outcome: Progressing Towards Goal  Note: Fall Risk Interventions:  Mobility Interventions: Bed/chair exit alarm,Communicate number of staff needed for ambulation/transfer,Patient to call before getting OOB,PT Consult for mobility concerns,PT Consult for assist device competence,Utilize walker, cane, or other assistive device         Medication Interventions: Bed/chair exit alarm,Evaluate medications/consider consulting pharmacy,Patient to call before getting OOB,Teach patient to arise slowly    Elimination Interventions: Bed/chair exit alarm,Call light in reach,Patient to call for help with toileting needs,Toilet paper/wipes in reach    History of Falls Interventions: Bed/chair exit alarm,Door open when patient unattended,Evaluate medications/consider consulting pharmacy         Problem: Patient Education: Go to Patient Education Activity  Goal: Patient/Family Education  Outcome: Progressing Towards Goal     Problem: Pressure Injury - Risk of  Goal: *Prevention of pressure injury  Description: Document Bobby Scale and appropriate interventions in the flowsheet.   Outcome: Progressing Towards Goal  Note: Pressure Injury Interventions:  Sensory Interventions: Assess changes in LOC,Keep linens dry and wrinkle-free    Moisture Interventions: Absorbent underpads,Minimize layers    Activity Interventions: Increase time out of bed,Pressure redistribution bed/mattress(bed type),PT/OT evaluation    Mobility Interventions: Pressure redistribution bed/mattress (bed type),PT/OT evaluation    Nutrition Interventions: Document food/fluid/supplement intake    Friction and Shear Interventions: Minimize layers                Problem: Patient Education: Go to Patient Education Activity  Goal: Patient/Family Education  Outcome: Progressing Towards Goal     Problem: Patient Education: Go to Patient Education Activity  Goal: Patient/Family Education  Outcome: Progressing Towards Goal     Problem: Patient Education: Go to Patient Education Activity  Goal: Patient/Family Education  Outcome: Progressing Towards Goal     Problem: Patient Education: Go to Patient Education Activity  Goal: Patient/Family Education  Outcome: Progressing Towards Goal     Problem: Cath Lab Procedures: Pre-Procedure  Goal: Off Pathway (Use only if patient is Off Pathway)  Outcome: Progressing Towards Goal  Goal: Activity/Safety  Outcome: Progressing Towards Goal  Goal: Consults, if ordered  Outcome: Progressing Towards Goal  Goal: Diagnostic Test/Procedures  Outcome: Progressing Towards Goal  Goal: Nutrition/Diet  Outcome: Progressing Towards Goal  Goal: Discharge Planning  Outcome: Progressing Towards Goal  Goal: Medications  Outcome: Progressing Towards Goal  Goal: Respiratory  Outcome: Progressing Towards Goal  Goal: Treatments/Interventions/Procedures  Outcome: Progressing Towards Goal  Goal: Psychosocial  Outcome: Progressing Towards Goal  Goal: *Verbalize description of procedure  Outcome: Progressing Towards Goal  Goal: *Consent signed  Outcome: Progressing Towards Goal     Problem: Cath Lab Procedures: Post-Cath Day of Procedure (Initiate SCIP Measures for Post-Op Care)  Goal: Off Pathway (Use only if patient is Off Pathway)  Outcome: Progressing Towards Goal  Goal: Activity/Safety  Outcome: Progressing Towards Goal  Goal: Consults, if ordered  Outcome: Progressing Towards Goal  Goal: Diagnostic Test/Procedures  Outcome: Progressing Towards Goal  Goal: Nutrition/Diet  Outcome: Progressing Towards Goal  Goal: Discharge Planning  Outcome: Progressing Towards Goal  Goal: Medications  Outcome: Progressing Towards Goal  Goal: Respiratory  Outcome: Progressing Towards Goal  Goal: Treatments/Interventions/Procedures  Outcome: Progressing Towards Goal  Goal: Psychosocial  Outcome: Progressing Towards Goal  Goal: *Procedure site is without bleeding and signs of infection six hours post sheath removal  Outcome: Progressing Towards Goal  Goal: *Hemodynamically stable  Outcome: Progressing Towards Goal  Goal: *Optimal pain control at patient's stated goal  Outcome: Progressing Towards Goal     Problem: Cath Lab Procedures: Post-Cath Day 1  Goal: Off Pathway (Use only if patient is Off Pathway)  Outcome: Progressing Towards Goal  Goal: Activity/Safety  Outcome: Progressing Towards Goal  Goal: Diagnostic Test/Procedures  Outcome: Progressing Towards Goal  Goal: Nutrition/Diet  Outcome: Progressing Towards Goal  Goal: Discharge Planning  Outcome: Progressing Towards Goal  Goal: Medications  Outcome: Progressing Towards Goal  Goal: Respiratory  Outcome: Progressing Towards Goal  Goal: Treatments/Interventions/Procedures  Outcome: Progressing Towards Goal  Goal: Psychosocial  Outcome: Progressing Towards Goal     Problem: Cath Lab Procedures: Discharge Outcomes  Goal: *Stable cardiac rhythm  Outcome: Progressing Towards Goal  Goal: *Hemodynamically stable  Outcome: Progressing Towards Goal  Goal: *Optimal pain control at patient's stated goal  Outcome: Progressing Towards Goal  Goal: *Pulses palpable, skin color within defined limits, skin temperature warm  Outcome: Progressing Towards Goal  Goal: *Lungs clear or at baseline  Outcome: Progressing Towards Goal  Goal: *Demonstrates ability to perform prescribed activity without shortness of breath or discomfort  Outcome: Progressing Towards Goal  Goal: *Verbalizes home exercise program, activity guidelines, cardiac precautions  Outcome: Progressing Towards Goal  Goal: *Verbalizes understanding and describes prescribed diet  Outcome: Progressing Towards Goal  Goal: *Verbalizes understanding and describes medication purposes and frequencies  Outcome: Progressing Towards Goal  Goal: *Identifies cardiac risk factors  Outcome: Progressing Towards Goal  Goal: *No signs and symptoms of infection or wound complications  Outcome: Progressing Towards Goal  Goal: *Anxiety reduced or absent  Outcome: Progressing Towards Goal  Goal: *Verbalizes and demonstrates incision care  Outcome: Progressing Towards Goal  Goal: *Understands and describes signs and symptoms to report to providers(Stroke Metric)  Outcome: Progressing Towards Goal  Goal: *Describes follow-up/return visits to physicians  Outcome: Progressing Towards Goal  Goal: *Describes available resources and support systems  Outcome: Progressing Towards Goal  Goal: *Influenza immunization  Outcome: Progressing Towards Goal  Goal: *Pneumococcal immunization  Outcome: Progressing Towards Goal     Problem: Impaired Skin Integrity/Pressure Injury Treatment  Goal: *Improvement of Existing Pressure Injury  Outcome: Progressing Towards Goal  Goal: *Prevention of pressure injury  Description: Document Bobby Scale and appropriate interventions in the flowsheet.   Outcome: Progressing Towards Goal  Note: Pressure Injury Interventions:  Sensory Interventions: Assess changes in LOC,Keep linens dry and wrinkle-free    Moisture Interventions: Absorbent underpads,Minimize layers    Activity Interventions: Increase time out of bed,Pressure redistribution bed/mattress(bed type),PT/OT evaluation    Mobility Interventions: Pressure redistribution bed/mattress (bed type),PT/OT evaluation    Nutrition Interventions: Document food/fluid/supplement intake    Friction and Shear Interventions: Minimize layers                Problem: Patient Education: Go to Patient Education Activity  Goal: Patient/Family Education  Outcome: Progressing Towards Goal

## 2022-02-10 NOTE — PROGRESS NOTES
[address] [address]  Admission Date: 2/3/2022         Daily Progress Note: 2/10/2022    The patient's chart is reviewed and the patient is discussed with the staff. Background:   Patient is a 66 y.o.  female seen and evaluated at the request of Dr. Rafaela Charles for hypoxia and concerns for PE. Pt just had CT scan completed, which was negative for PE, but does show some irregular spiculated 2.8 cm lesion in LLL. Pt is known to us in the office for very severe COPD with centrilobular emphysema. She was referred to [facility] for possible lung transplant but was denied. She had a navigation bronchoscopy with attempts at biopsy of a CANDELARIO nodule 2/11/20 which was difficult to visualize with radial probe and ultimately was non diagnostic. PET scan showed this area to have an SUV of 2.1. She was to have a bronchoscopy attempt at [facility] in July but repeat imaging performed here showed improvement in this area so the bronchoscopy has been cancelled. Using symbicort, spiriva, daliresp and azithromycin OP. Has completed pulmonary rehab. She wears O2 at night on 1 L. Recently completed steroid taper. She was admitted for R hip fracture and underwent surgery for repair on 2/4/22  This morning had an acute change in her O2 needs requiring high FiO2 on bipap. She was given 40mg lasix IV and sent for CTA chest due to concerns for PE. Pt has now been weaned to 4L NC, but still having increased work of breathing. She is wheezing and coughing, although not productive. Denies fevers. Per nursing, cano removed several days ago and has had issues with retention vs low UOP since. Bladder scans with up to 500ml in bladder requiring in and out caths.     Subjective:     Going to rehab today     Current Facility-Administered Medications   Medication Dose Route Frequency    furosemide (LASIX) tablet 40 mg  40 mg Oral ONCE    predniSONE (DELTASONE) tablet 40 mg  40 mg Oral DAILY WITH LUNCH    lisinopriL (PRINIVIL, ZESTRIL) tablet 10 mg  10 mg Oral DAILY    LORazepam (ATIVAN) tablet 0.5 mg  0.5 mg Oral Q4H PRN    labetaloL (NORMODYNE;TRANDATE) injection 20 mg  20 mg IntraVENous Q4H PRN    0.9% sodium chloride infusion 250 mL  250 mL IntraVENous PRN    methIMAzole (TAPAZOLE) tablet 10 mg  10 mg Oral DAILY    albuterol (PROVENTIL HFA, VENTOLIN HFA, PROAIR HFA) inhaler 2 Puff  2 Puff Inhalation Q6H PRN    levoFLOXacin (LEVAQUIN) tablet 750 mg  750 mg Oral Q24H    heparin (porcine) injection 5,000 Units  5,000 Units SubCUTAneous Q8H    nitroglycerin (NITROBID) 2 % ointment 1 Inch  1 Inch Topical BID    morphine injection 2 mg  2 mg IntraVENous Q4H PRN    acetaminophen (TYLENOL) tablet 650 mg  650 mg Oral Q8H    lidocaine 4 % patch 1 Patch  1 Patch TransDERmal Q24H    metoprolol tartrate (LOPRESSOR) tablet 50 mg  50 mg Oral Q6H    prasugreL (EFFIENT) tablet 10 mg  10 mg Oral DAILY    albuterol (PROVENTIL VENTOLIN) nebulizer solution 2.5 mg  2.5 mg Nebulization QID RT    albuterol (PROVENTIL VENTOLIN) nebulizer solution 2.5 mg  2.5 mg Nebulization Q4H PRN    albuterol-ipratropium (DUO-NEB) 2.5 MG-0.5 MG/3 ML  3 mL Nebulization Q6H PRN    guaiFENesin ER (MUCINEX) tablet 1,200 mg  1,200 mg Oral Q12H    budesonide (PULMICORT) 500 mcg/2 ml nebulizer suspension  1,000 mcg Nebulization BID RT    nitroglycerin (NITROSTAT) tablet 0.4 mg  0.4 mg SubLINGual PRN    atorvastatin (LIPITOR) tablet 80 mg  80 mg Oral QHS    aspirin delayed-release tablet 81 mg  81 mg Oral DAILY    [Held by provider] budesonide-formoteroL (SYMBICORT) 160-4.5 mcg/actuation HFA inhaler 2 Puff  2 Puff Inhalation BID RT    pantoprazole (PROTONIX) tablet 40 mg  40 mg Oral BID    tiotropium bromide (SPIRIVA RESPIMAT) 2.5 mcg /actuation  2 Puff Inhalation DAILY    alcohol 62% (NOZIN) nasal  1 Ampule  1 Ampule Topical Q12H    sodium chloride (NS) flush 5-40 mL  5-40 mL IntraVENous PRN    naloxone (NARCAN) injection 0.2-0.4 mg 0.2-0.4 mg IntraVENous Q10MIN PRN    senna-docusate (PERICOLACE) 8.6-50 mg per tablet 2 Tablet  2 Tablet Oral DAILY    ondansetron (ZOFRAN) injection 4 mg  4 mg IntraVENous Q4H PRN     Review of Systems  Constitutional: negative for fever, chills, sweats  Cardiovascular: negative for chest pain, palpitations, syncope, edema  Gastrointestinal:  negative for dysphagia, reflux, vomiting, diarrhea, abdominal pain, or melena  Neurologic:  negative for focal weakness, numbness, headache    Objective:     Vitals:    02/10/22 0439 02/10/22 0732 02/10/22 0903 02/10/22 1146   BP: 131/70 132/74  128/60   Pulse: 78 74  78   Resp: 20 20  20   Temp: 97.4 °F (36.3 °C) 97.4 °F (36.3 °C)  97.7 °F (36.5 °C)   SpO2: 96% 99% 94% 96%   Weight: 155 lb 6.4 oz (70.5 kg)      Height:           Intake/Output Summary (Last 24 hours) at 2/10/2022 1309  Last data filed at 2/10/2022 1020  Gross per 24 hour   Intake 370 ml   Output 400 ml   Net -30 ml     Physical Exam:   Constitution:  the patient is well developed and in no acute distress  HEENT:  Sclera clear, pupils equal, oral mucosa moist  Respiratory: On 2L NC, no wheezing  Cardiovascular:  RRR without M,G,R  Gastrointestinal: soft and non-tender; with positive bowel sounds. Musculoskeletal: warm without cyanosis. There is trace to 1+ lower extremity edema. Skin:  no jaundice or rashes, no wounds   Neurologic: no gross neuro deficits     Psychiatric:  alert and oriented x ppt    CXR:  Pending      LAB:  Recent Labs     02/10/22  0306 02/09/22  0355 02/08/22  2244 02/08/22 0422 02/08/22 0422   WBC 8.0 9.9  --   --  10.0   HGB 8.8* 9.2* 8.9*   < > 7.1*   HCT 28.1* 28.1* 27.7*   < > 22.8*    242  --   --  214    < > = values in this interval not displayed.      Recent Labs     02/10/22  0306 02/09/22  0355 02/08/22  0422 02/07/22  1707   * 137 140  --    K 4.3 4.0 4.0  --     103 105  --    CO2 32 28 30  --    * 182* 152*  --    BUN 24* 19 18  --    CREA 0.80 0. 80 0.80  --    MG  --   --   --  2.1   CA 9.0 9.4 9.2  --    ALB  --   --  2.5*  --    AST  --   --  94*  --    ALT  --   --  29  --    AP  --   --  57  --      Recent Labs     02/09/22  1111   TROPHS 3,869.5*   BNPNT 21,522*     No results for input(s): PH, PCO2, PO2, HCO3, PHI, PCO2I, PO2I, HCO3I in the last 72 hours. Recent Labs     02/08/22  0233   CULT MODERATE NORMAL RESPIRATORY KELLY     Assessment and Plan:  (Medical Decision Making)   Principal Problem:    Other fracture of right femur, initial encounter for closed fracture ([address] (2/4/2022)        Active Problems:    COPD, very severe ([address] (8/20/2013)    Known and turned down from transplant. Does not seem to be in exacerbation at present. Continue budesonide nebulizers and Spiriva inhaler. Acute respiratory failure with hypoxia ([address] (8/22/2013)    Currently on 2L O2. Wean as tolerated. Hyperlipidemia (10/10/2016)          Essential hypertension, benign (10/10/2016)          CAD S/P percutaneous coronary angioplasty (5/31/2019)          Pulmonary mass (2/11/2020)    Infiltrative appearing nodules. Has had similar ones in the past eval with whit bronch and benign. Treating with levaquin and outpatient follow up imaging. Closed right hip fracture ([address] (2/4/2022)          Nonrheumatic mitral valve regurgitation (2/8/2022)          Iron deficiency anemia due to chronic blood loss (2/8/2022)        Finish levaquin 7 days, follow up CT scan in 6-8 weeks with follow up appointment       More than 50% of the time documented was spent in face-to-face contact with the patient and in the care of the patient on the floor/unit where the patient is located.     Jaron Michael MD

## 2022-02-10 NOTE — ROUTINE PROCESS
Bedside and Verbal shift change report given to myself (oncoming nurse) by [id number] (offgoing nurse). Report included the following information SBAR, Kardex, Intake/Output, MAR, Recent Results and Med Rec Status.

## 2022-02-10 NOTE — ROUTINE PROCESS
Patient c/o CP again. EKG done by this RN. Showed NSR w/ PAC, as it did yesterday. REKHA Roberts, verified EKG with me. Patient was given Morphine and is on 1.5L of oxygen. Will continue to monitor. Family at bedside.

## 2022-02-10 NOTE — PROGRESS NOTES
2/10/2022 3:22 PM    Admit Date: 2/3/2022    Admit Diagnosis: Other fracture of right femur, initial encounter for closed fracture ([facility] [S72.8X1A]      Subjective:   No further cp- feels better      Objective:      Visit Vitals  BP (!) 88/47 (BP 1 Location: Right upper arm, BP Patient Position: Sitting)   Pulse 85   Temp 97.7 °F (36.5 °C)   Resp 20   Ht 5' 8\" (1.727 m)   Wt 155 lb 6.4 oz (70.5 kg)   SpO2 96%   BMI 23.63 kg/m²       Physical Exam:  [place], Well Nourished, No Acute Distress, Alert & Oriented x 3, appropriate mood. Neck- supple, no JVD  CV- regular rate and rhythm no MRG  Lung- clear bilaterally  Abd- soft, nontender, nondistended  Ext- no edema bilaterally. Skin- warm and dry        Data Review:   Recent Labs     02/10/22  0306   *   K 4.3   BUN 24*   CREA 0.80   WBC 8.0   HGB 8.8*   HCT 28.1*          Assessment/Plan:     Principal Problem:    Other fracture of right femur, initial encounter for closed fracture ([facility] (2/4/2022)    Active Problems:    COPD, very severe ([address] (8/20/2013)      Acute respiratory failure with hypoxia ([address] (8/22/2013)      Hyperlipidemia (10/10/2016)      Essential hypertension, benign (10/10/2016)      CAD S/P percutaneous coronary angioplasty (5/31/2019)Improved with current therapy.  Will continue medications  Trop trending down      Chronic respiratory failure with hypoxia ([address] (6/8/2019)      Pulmonary mass (2/11/2020)      Closed right hip fracture (HCC) (2/4/2022)      Moderate to severe mitral regurgitation (2/8/2022)      Iron deficiency anemia due to chronic blood loss (2/8/2022)      COPD with acute lower respiratory infection ([address] (2/10/2022)     Hypotension- bp low - stop ntg paste and hold ace

## 2022-02-10 NOTE — H&P
Umer Solorio MD  Medical Director  [address] [facility], [address] [facility]  Tel: [phone]       Admission History and Physical Exam  Mackenzie Lopez Date: 2/101022  Surgeon: Dr Jyothi Mon  Primary Care Provider: Josh Herring MD  Specialty Group / Referring Service: Hospitalist, Cardiology, Pulmonary    Chief Complaint : \" I feel funky \"  Admitting Diagnosis:   rt hip fracture    Active Problems  S/p Right gamma nail insertion for a right IT femur fx  COPD with acute lower resp infxn and exacerbation  Chronic respiratory failure with hypoxia  CAD s/p PCA stent x 2  YULISA   Moderate to severe MV regurgitation  Severe COPD; O2 and Steroid dependent  Orthostatic hypotension      Acute Rehab Dx:  Gait impairment / gait dysfunction  Debility  Deconditioning  Mobility and ambulation deficits  Self care / ADL deficits    Medical Dx:  Past Medical History:   Diagnosis Date    Arrhythmia     Arthritis     CAD (coronary artery disease) 2009, 8/19/2013    PCI,  Bala Varinder     Carotid stenosis, right     endarterectomy 11/25/19    Chronic anxiety 4/27/2017    COPD     recent referral to [facility] Uli Schro[facility] for lung transplant    Emphysema lung ([facility] [address]     GERD (gastroesophageal reflux disease)     controlled with medication    History of echocardiogram 06/07/2019    History of echocardiogram     echo 06/07/19 LVEF 55-60%    Hypertension     Nausea & vomiting     Oxygen dependent     1 liter at night    Pleurisy     Thyroid disease     pt denies       Date of Evaluation: February 10, 2022    Andrew Carrillo is a 66 y.o. female patient at [address] [place] [address] who was admitted to [address] [place] [id number] (Not on file) by Umer Solorio MD of Physical Medicine and Rehab service with below-mentioned medical history.     HPI: The patient is a right-hand dominant, previously functionally independent 66yo WF with PMH including CAD s/p PCI, HTN, O2- and steroid-dependent COPD, GERD, DDD with T12 compression fracture (11/2021), past tobacco use. She presented to the ED 2/3/2022 after mechanical fall with subsequent right hip pain and inability to bear weight. XR noted right femur intertrochanteric fracture. Cardiology cleared patient for surgery. On 2/4, she underwent gamma nail insertion by Wing Parveen MD ([facility]). She was evaluated by PT / OT on POD #1 and was found to have mobility and self-care deficits. Early POD #2, she experienced respiratory distress, tachycardia and substernal chest discomfort initially improved by NTG and BiPAP. CXR unrevealing, ECG not consistent with acute STEMI. Cardiology opined likely supply-demand mismatch in setting of tachycardia and hypoxemic respiratory failure and recommended TTE and PE work-up (ongoing). 2/6 she had an acute change in her O2 needs requiring high FiO2 on bipap. She was given 40mg lasix IV and sent for CTA chest due to concerns for PE. Pt had been weaned to 4L NC, but still having increased work of breathing. She was wheezing and coughing, although not productive. Denied fevers.  ct showed newer nodules, but this didn't  account for the acute change in her breathing. It did show increased interstitial prominence which could be edema post surgery and bc she had IVF infusing. Pulmonary was consulted. Po Pred was changed to IV solumedrol. They added BNP to labs, pro carlos and lactic acid to r/o infxn as well as spt and blood cxs. She was  Found to have elevated troponins. Repeat ECHO with EF of 52% but mod to severe mitral regurgitation. EKG then showed ST depression. She was placed on BiPaP and improved. On 2/7 she had severe cp and underwent a Stat cardiac catheterization with PCI to the LAD diagonal and to the distal right coronary artery was performed. The following day, she had no cp and was back on 2L O2.  She had further chest pain on 2/9 . troponins were 3869.5 and NT ProBNp 21,522. ntg paste added but not thought to be an acute cardiac event. Today, 2/10, she is on 2L O2. Without cp. He had hyptension with sbp in the 80s after transferring from chair back to be. ACE inhibitor and nitro paste discontinued. Physical Medicine and Rehab service was consulted, and patient was initially evaluated by Dr. Arnaldo Vazquez / REKHA Patel on 2/6     During reevaluation earlier today, patient shows improvement from initial consult but still shows significant functional deficits. We recommend inpatient hospital rehabilitation as reasonable and necessary due to ongoing acute medical issues which have not changed since initial pre-admission evaluation. We reviewed the preadmission screening and have approved the patient for admission to the [facility]. Attending service cleared patient for transfer to rehab today. Patient continues to have ongoing medical issues outlined above requiring physician / PA medical supervision and has functional deficits which would benefit from continued intensive therapies. Current Level of Function: (evaluated by acute therapy staff, with bed mobility, transfers, balance personally observed post-admission in the [facility] setting minutes prior to submission of document)   PT ; max assist with bed mobility , min/mod assist STS, min assist transfers, min assist for walking 3ft with RW.   OT; no recent ADLs recorded x set up for grooming and feeding. Prior Home Situation:   Home Environment: Private residence  One/Two Story Residence: One story with one step to enter  Living Alone: No  Support Systems: Significant other Timmy Other,Child(raghavendra),Friend/Neighbor      Previous Level of Function / Work / Activity:   Premorbidly functionally independent without AD.  O2 dependent; community ambulation limited first by COPD, then by pain from T12 compression fracture        Past Medical History:   Diagnosis Date    Arrhythmia     Arthritis     CAD (coronary artery disease) , 2013    PCIJeffrey     Carotid stenosis, right     endarterectomy 19    Chronic anxiety 2017    COPD     recent referral to [facility] Dr Uli Zayas for lung transplant    Emphysema lung ([facility] [address]     GERD (gastroesophageal reflux disease)     controlled with medication    History of echocardiogram 2019    History of echocardiogram     echo 19 LVEF 55-60%    Hypertension     Nausea & vomiting     Oxygen dependent     1 liter at night    Pleurisy     Thyroid disease     pt denies      Past Surgical History:   Procedure Laterality Date    HX GYN      rebuilt cervix    HX HEART CATHETERIZATION  2019    last stent- per patient- 10 stents total    HX OTHER SURGICAL      HX TONSIL AND ADENOIDECTOMY      VASCULAR SURGERY PROCEDURE UNLIST Right 2019    carotid endarterectomy     Allergies   Allergen Reactions    Promethazine Nausea and Vomiting and Other (comments)     Severe vomiting       Family History   Problem Relation Age of Onset    No Known Problems Mother         adopted      Social History     Tobacco Use    Smoking status: Former Smoker     Packs/day: 0.75     Years: 53.00     Pack years: 39.75     Types: Cigarettes     Quit date:      Years since quittin.1    Smokeless tobacco: Never Used   Substance Use Topics    Alcohol use: Yes     Comment: rarely      Cannot display prior to admission medications because the patient has not been admitted in this contact.      Current Facility-Administered Medications on File Prior to Encounter   Medication Dose Route Frequency Provider Last Rate Last Admin    predniSONE (DELTASONE) tablet 40 mg  40 mg Oral DAILY WITH LUNCH Vista Bamberger, MD   40 mg at 02/10/22 1404    [Held by provider] lisinopriL (PRINIVIL, ZESTRIL) tablet 10 mg  10 mg Oral DAILY Vista Bamberger, MD   10 mg at 02/10/22 0804    LORazepam (ATIVAN) tablet 0.5 mg  0.5 mg Oral Q4H PRN Vista Bamberger, MD   0.5 mg at 22 0585  labetaloL (NORMODYNE;TRANDATE) injection 20 mg  20 mg IntraVENous Q4H PRN Shital Waterman MD        0.9% sodium chloride infusion 250 mL  250 mL IntraVENous PRN Malu Haile MD        methIMAzole (TAPAZOLE) tablet 10 mg  10 mg Oral DAILY Malu Haile MD   10 mg at 02/10/22 0803    albuterol (PROVENTIL HFA, VENTOLIN HFA, PROAIR HFA) inhaler 2 Puff  2 Puff Inhalation Q6H PRN Alisa Zepeda MD   2 Puff at 02/09/22 0909    levoFLOXacin (LEVAQUIN) tablet 750 mg  750 mg Oral Q24H Faustino Orr MD   750 mg at 02/10/22 1728    heparin (porcine) injection 5,000 Units  5,000 Units SubCUTAneous Q8H Malu Haile MD   5,000 Units at 02/10/22 1728    morphine injection 2 mg  2 mg IntraVENous Q4H PRN Steffanie Lieberman MD   2 mg at 02/10/22 5810    acetaminophen (TYLENOL) tablet 650 mg  650 mg Oral Q8H Steffanie Lieberman MD   650 mg at 02/10/22 1412    lidocaine 4 % patch 1 Patch  1 Patch TransDERmal Q24H Steffanie Lieberman MD   1 Patch at 02/10/22 1021    metoprolol tartrate (LOPRESSOR) tablet 50 mg  50 mg Oral Q6H Steffanie Lieberman MD   50 mg at 02/10/22 1403    prasugreL (EFFIENT) tablet 10 mg  10 mg Oral DAILY Steffanie Lieberman MD   10 mg at 02/10/22 0900    albuterol (PROVENTIL VENTOLIN) nebulizer solution 2.5 mg  2.5 mg Nebulization Q4H PRN Micah Delgadillo MD   2.5 mg at 02/08/22 0551    albuterol-ipratropium (DUO-NEB) 2.5 MG-0.5 MG/3 ML  3 mL Nebulization Q6H PRN Steffanie Lieberman MD   3 mL at 02/09/22 0202    guaiFENesin ER (MUCINEX) tablet 1,200 mg  1,200 mg Oral Q12H Steffanie Lieberman MD   1,200 mg at 02/10/22 0804    nitroglycerin (NITROSTAT) tablet 0.4 mg  0.4 mg SubLINGual PRN Steffanie Lieberman MD   0.4 mg at 02/09/22 0427    atorvastatin (LIPITOR) tablet 80 mg  80 mg Oral QHS Steffanie Lieberman MD   80 mg at 02/09/22 2130    aspirin delayed-release tablet 81 mg  81 mg Oral DAILY Steffanie Lieberman MD   81 mg at 02/10/22 0804    budesonide-formoteroL (SYMBICORT) 160-4.5 mcg/actuation HFA inhaler 2 Puff  2 Puff Inhalation BID RT Steffanie Lieberman MD   2 Puff at 02/05/22 1946    pantoprazole (PROTONIX) tablet 40 mg  40 mg Oral BID Steffanie Lieberman MD   40 mg at 02/10/22 1728    tiotropium bromide (SPIRIVA RESPIMAT) 2.5 mcg /actuation  2 Puff Inhalation DAILY Steffanie Lieberman MD   2 Puff at 02/09/22 0857    alcohol 62% (NOZIN) nasal  1 Ampule  1 Ampule Topical Q12H Steffanie Lieberman MD   1 Ampule at 02/10/22 0803    sodium chloride (NS) flush 5-40 mL  5-40 mL IntraVENous PRN Steffanie Lieberman MD   10 mL at 02/10/22 1317    naloxone [facility]) injection 0.2-0.4 mg  0.2-0.4 mg IntraVENous Q10MIN PRN Steffanie Lieberman MD        senna-docusate (PERICOLACE) 8.6-50 mg per tablet 2 Tablet  2 Tablet Oral DAILY Steffanie Lieberman MD   2 Tablet at 02/10/22 0804    ondansetron [facility]) injection 4 mg  4 mg IntraVENous Q4H PRN Steffanie Lieberman MD   4 mg at 02/10/22 1021     Current Outpatient Medications on File Prior to Encounter   Medication Sig Dispense Refill    [START ON 2/11/2022] methIMAzole (TAPAZOLE) 10 mg tablet Take 1 Tablet by mouth daily. Indications: overactive thyroid gland 30 Tablet 1    levoFLOXacin (LEVAQUIN) 750 mg tablet Take 1 Tablet by mouth every twenty-four (24) hours for 3 doses. 3 Tablet 0    predniSONE (DELTASONE) 20 mg tablet Take 40mg qday x3d, then 20mg qday x3d, then 10mg x2d, then stop. 10 Tablet 0    [START ON 2/11/2022] prasugreL (EFFIENT) 10 mg tablet Take 1 Tablet by mouth daily. 30 Tablet 1    nitroglycerin (NITROSTAT) 0.4 mg SL tablet 1 Tablet by SubLINGual route three (3) times daily as needed for Chest Pain. Up to 3 doses. 60 Each 11    azithromycin (ZITHROMAX) 500 mg tab TAKE 1 TABLET BY MOUTH EVERY MONDAY, WEDNESDAY, AND FRIDAY 12 Tablet 5    predniSONE (DELTASONE) 20 mg tablet 2 tabs po qd x 3 days, 1 and 1/2 tabs po qd x 3 days, 1 tab po qd x 3 days, 1/2 tab po qd x 3 days, or as directed.  (Patient not taking: Reported on 2/8/2022) 15 Tablet 0    roflumilast (Daliresp) 500 mcg tab tablet Take 1 Tablet by mouth daily. 90 Tablet 3    ezetimibe (ZETIA) 10 mg tablet Take 1 Tablet by mouth daily. 90 Tablet 3    budesonide-formoteroL (Symbicort) 160-4.5 mcg/actuation HFAA Take 2 Puffs by inhalation two (2) times a day. 3 Each 3    metoprolol succinate (TOPROL-XL) 100 mg tablet Take 1 Tablet by mouth daily. 90 Tablet 3    clopidogreL (PLAVIX) 75 mg tab TAKE 1 TABLET BY MOUTH DAILY (Patient not taking: Reported on 2/9/2022) 90 Tablet 3    atorvastatin (LIPITOR) 80 mg tablet TAKE 1 TABLET BY MOUTH DAILY 90 Tablet 3    ramipriL (ALTACE) 5 mg capsule Take 1 Capsule by mouth daily. 90 Capsule 3    albuterol (PROVENTIL HFA, VENTOLIN HFA, PROAIR HFA) 90 mcg/actuation inhaler Take 2 Puffs by inhalation every four (4) hours as needed for Wheezing or Shortness of Breath. 3 Inhaler 3    torsemide (DEMADEX) 10 mg tablet Take 1 Tablet by mouth daily. 30 Tablet 5    tiotropium (Spiriva with HandiHaler) 18 mcg inhalation capsule Take 1 Cap by inhalation daily. 90 Cap 3    OTHER Handicap placard 1 Each 0    albuterol (PROVENTIL VENTOLIN) 2.5 mg /3 mL (0.083 %) nebu 3 mL by Nebulization route every six (6) hours as needed for Shortness of Breath. ICD-10-J44.9, please bill to Med B 360 mL 11    multivitamin/folic acid/biotin (HAIR-SKIN-NAILS, MV-FA-BIOTIN, PO) Take  by mouth.  pantoprazole (PROTONIX) 40 mg tablet TAKE 1 TABLET BY MOUTH TWICE DAILY 180 Tab 0    OXYGEN-AIR DELIVERY SYSTEMS by Does Not Apply route. 1lpm qhs       nitroglycerin (NITROSTAT) 0.4 mg SL tablet 1 Tab by SubLINGual route every five (5) minutes as needed for Chest Pain. 1 Bottle 12    cetirizine (ZYRTEC) 10 mg tablet Take  by mouth daily as needed.  multivitamin (ONE A DAY) tablet Take 1 Tablet by mouth daily.  guaiFENesin ER (MUCINEX) 600 mg ER tablet Take 600 mg by mouth two (2) times daily as needed.  aspirin delayed-release 81 mg tablet Take  by mouth daily.        No current facility-administered medications for this encounter. Current Outpatient Medications   Medication Sig    [START ON 2/11/2022] methIMAzole (TAPAZOLE) 10 mg tablet Take 1 Tablet by mouth daily. Indications: overactive thyroid gland    levoFLOXacin (LEVAQUIN) 750 mg tablet Take 1 Tablet by mouth every twenty-four (24) hours for 3 doses.  predniSONE (DELTASONE) 20 mg tablet Take 40mg qday x3d, then 20mg qday x3d, then 10mg x2d, then stop.  [START ON 2/11/2022] prasugreL (EFFIENT) 10 mg tablet Take 1 Tablet by mouth daily.  nitroglycerin (NITROSTAT) 0.4 mg SL tablet 1 Tablet by SubLINGual route three (3) times daily as needed for Chest Pain. Up to 3 doses.      Facility-Administered Medications Ordered in Other Encounters   Medication Dose Route Frequency    predniSONE (DELTASONE) tablet 40 mg  40 mg Oral DAILY WITH LUNCH    [Held by provider] lisinopriL (PRINIVIL, ZESTRIL) tablet 10 mg  10 mg Oral DAILY    LORazepam (ATIVAN) tablet 0.5 mg  0.5 mg Oral Q4H PRN    labetaloL (NORMODYNE;TRANDATE) injection 20 mg  20 mg IntraVENous Q4H PRN    0.9% sodium chloride infusion 250 mL  250 mL IntraVENous PRN    methIMAzole (TAPAZOLE) tablet 10 mg  10 mg Oral DAILY    albuterol (PROVENTIL HFA, VENTOLIN HFA, PROAIR HFA) inhaler 2 Puff  2 Puff Inhalation Q6H PRN    levoFLOXacin (LEVAQUIN) tablet 750 mg  750 mg Oral Q24H    heparin (porcine) injection 5,000 Units  5,000 Units SubCUTAneous Q8H    morphine injection 2 mg  2 mg IntraVENous Q4H PRN    acetaminophen (TYLENOL) tablet 650 mg  650 mg Oral Q8H    lidocaine 4 % patch 1 Patch  1 Patch TransDERmal Q24H    metoprolol tartrate (LOPRESSOR) tablet 50 mg  50 mg Oral Q6H    prasugreL (EFFIENT) tablet 10 mg  10 mg Oral DAILY    albuterol (PROVENTIL VENTOLIN) nebulizer solution 2.5 mg  2.5 mg Nebulization Q4H PRN    albuterol-ipratropium (DUO-NEB) 2.5 MG-0.5 MG/3 ML  3 mL Nebulization Q6H PRN    guaiFENesin ER (MUCINEX) tablet 1,200 mg  1,200 mg Oral Q12H    nitroglycerin (NITROSTAT) tablet 0.4 mg  0.4 mg SubLINGual PRN    atorvastatin (LIPITOR) tablet 80 mg  80 mg Oral QHS    aspirin delayed-release tablet 81 mg  81 mg Oral DAILY    budesonide-formoteroL (SYMBICORT) 160-4.5 mcg/actuation HFA inhaler 2 Puff  2 Puff Inhalation BID RT    pantoprazole (PROTONIX) tablet 40 mg  40 mg Oral BID    tiotropium bromide (SPIRIVA RESPIMAT) 2.5 mcg /actuation  2 Puff Inhalation DAILY    alcohol 62% (NOZIN) nasal  1 Ampule  1 Ampule Topical Q12H    sodium chloride (NS) flush 5-40 mL  5-40 mL IntraVENous PRN    naloxone (NARCAN) injection 0.2-0.4 mg  0.2-0.4 mg IntraVENous Q10MIN PRN    senna-docusate (PERICOLACE) 8.6-50 mg per tablet 2 Tablet  2 Tablet Oral DAILY    ondansetron (ZOFRAN) injection 4 mg  4 mg IntraVENous Q4H PRN       Review of Systems:  General: Denies: fevers, chills, sweats,  +fatigue, malaise, anorexia, weight loss   Eyes:  Denies: blurry vision, loss of vision, eye pain, photophobia   HENT:  Denies: hearing loss, tinnitus, earache, epistaxis, sore throat, hoarseness  Lungs: Denies: cough, wheeze, asthma, hemoptysis+ O2 dependent and steroid dependent COPD, + dyspnea on exertion  CV:  Denies: chest pain, palpitations, syncope, orthopnea, paroxysmal nocturnal dyspnea, claudication + recent cp s/p cath with stent  GI:  Denies: dysphagia, odynophagia, nausea, vomiting, diarrhea, constipation, abdominal pain, jaundice, melena   :  Denies: frequency, dysuria, nocturia, urinary incontinence, stones, hematuria + retention  Endocrine: Denies: polydipsia/polyuria, skin changes, temperature intolerance, unexpected weight gain or loss  MSK:  Denies: back pain, joint pain, joint swelling, + muscle pain, muscle weakness   Heme:  Denies: bleeding problems, blood transfusions, bruising, pallor, swollen lymph nodes + chronic anemia  Neuro:  Denies: headache, dysarthria, blurred vision, diplopia, seizure, memory problems, speech problems, +gait problems, coordination problems      Objective: There were no vitals taken for this visit. Intake and Output:  No intake/output data recorded. Physical Exam:   General:  Alert, appears stated age. Fatigued appearing. anxious  No acute distress. HEENT:  Normocephalic, EOM intact, facial symmetry noted. Nares patent, oral mucosa moist without lesions. Lungs:  Clear to auscultation bilaterally. No adventitious bs  Respirations even and unlabored. Heart:  Regular rate and underlying rhythm, S1, S2. No obvious murmur, click, rub or gallop. Genitourinary: Deferred. Abdomen:  Soft, non-tender, not distended. Bowel sounds normoactive. No obvious masses or organomegaly palpated. Neuromuscular:  Generalized weakness. Right hip flexion hindered by pain     Skin:  Intact, dry, good skin turgor, age related changes present   Edema: trace   Incision:  Right hip inc c/d/i     Psych: Patient was oriented to person, place, and time. Without hallucinations, abnormal affect or abnormal behaviors during the examination.       Recent Results (from the past 72 hour(s))   TSH 3RD GENERATION    Collection Time: 02/07/22  5:07 PM   Result Value Ref Range    TSH 0.101 (L) 0.358 - 3.740 uIU/mL   T4, FREE    Collection Time: 02/07/22  5:07 PM   Result Value Ref Range    T4, Free 2.4 (H) 0.78 - 1.46 NG/DL   VITAMIN D, 25 HYDROXY    Collection Time: 02/07/22  5:07 PM   Result Value Ref Range    Vitamin D 25-Hydroxy 30.1 30.0 - 100.0 ng/mL   MAGNESIUM    Collection Time: 02/07/22  5:07 PM   Result Value Ref Range    Magnesium 2.1 1.6 - 2.3 mg/dL   CULTURE, RESPIRATORY/SPUTUM/BRONCH W GRAM STAIN    Collection Time: 02/08/22  2:33 AM    Specimen: Sputum   Result Value Ref Range    Special Requests: NO SPECIAL REQUESTS      GRAM STAIN 0 TO 3 WBCS PER OIF     GRAM STAIN 0 TO 1  EPITHELIAL CELLS SEEN     GRAM STAIN MODERATE GRAM POSITIVE COCCI      GRAM STAIN 4+ MUCUS PRESENT      Culture result: MODERATE NORMAL RESPIRATORY KELLY     METABOLIC PANEL, BASIC    Collection Time: 02/08/22  4:22 AM   Result Value Ref Range    Sodium 140 136 - 145 mmol/L    Potassium 4.0 3.5 - 5.1 mmol/L    Chloride 105 98 - 107 mmol/L    CO2 30 21 - 32 mmol/L    Anion gap 5 (L) 7 - 16 mmol/L    Glucose 152 (H) 65 - 100 mg/dL    BUN 18 8 - 23 MG/DL    Creatinine 0.80 0.6 - 1.0 MG/DL    GFR est AA >60 >60 ml/min/1.73m2    GFR est non-AA >60 >60 ml/min/1.73m2    Calcium 9.2 8.3 - 10.4 MG/DL   CBC W/O DIFF    Collection Time: 02/08/22  4:22 AM   Result Value Ref Range    WBC 10.0 4.3 - 11.1 K/uL    RBC 2.45 (L) 4.05 - 5.2 M/uL    HGB 7.1 (L) 11.7 - 15.4 g/dL    HCT 22.8 (L) 35.8 - 46.3 %    MCV 93.1 79.6 - 97.8 FL    MCH 29.0 26.1 - 32.9 PG    MCHC 31.1 (L) 31.4 - 35.0 g/dL    RDW 14.4 11.9 - 14.6 %    PLATELET 291 022 - 066 K/uL    MPV 10.0 9.4 - 12.3 FL    ABSOLUTE NRBC 0.00 0.0 - 0.2 K/uL   HEPATIC FUNCTION PANEL    Collection Time: 02/08/22  4:22 AM   Result Value Ref Range    Protein, total 6.0 (L) 6.3 - 8.2 g/dL    Albumin 2.5 (L) 3.2 - 4.6 g/dL    Globulin 3.5 2.3 - 3.5 g/dL    A-G Ratio 0.7 (L) 1.2 - 3.5      Bilirubin, total 0.3 0.2 - 1.1 MG/DL    Bilirubin, direct <0.1 <0.4 MG/DL    Alk. phosphatase 57 50 - 136 U/L    AST (SGOT) 94 (H) 15 - 37 U/L    ALT (SGPT) 29 12 - 65 U/L   RBC, ALLOCATE    Collection Time: 02/08/22  7:45 AM   Result Value Ref Range    HISTORY CHECKED?  Historical check performed    TYPE & SCREEN    Collection Time: 02/08/22 11:36 AM   Result Value Ref Range    Crossmatch Expiration 02/11/2022,1918     ABO/Rh(D) A NEGATIVE     Antibody screen NEG     Unit number [id number]     Blood component type [facility]     Unit division 00     Status of unit TRANSFUSED     ANTIGEN/ANTIBODY INFO Rita(A) NEGATIVE,  KIMI NEGATIVE,       Crossmatch result Compatible    FERRITIN    Collection Time: 02/08/22  3:15 PM   Result Value Ref Range    Ferritin 228 8 - 388 NG/ML   TRANSFERRIN SATURATION    Collection Time: 02/08/22  3:15 PM   Result Value Ref Range    Iron 49 35 - 150 ug/dL    TIBC 235 (L) 250 - 450 ug/dL    Transferrin Saturation 21 >20 %   VITAMIN B12    Collection Time: 02/08/22  3:15 PM   Result Value Ref Range    Vitamin B12 663 193 - 986 pg/mL   FOLATE    Collection Time: 02/08/22  3:15 PM   Result Value Ref Range    Folate 16.2 3.1 - 17.5 ng/mL   HGB & HCT    Collection Time: 02/08/22 10:44 PM   Result Value Ref Range    HGB 8.9 (L) 11.7 - 15.4 g/dL    HCT 27.7 (L) 35.8 - 46.3 %   CBC WITH AUTOMATED DIFF    Collection Time: 02/09/22  3:55 AM   Result Value Ref Range    WBC 9.9 4.3 - 11.1 K/uL    RBC 3.13 (L) 4.05 - 5.2 M/uL    HGB 9.2 (L) 11.7 - 15.4 g/dL    HCT 28.1 (L) 35.8 - 46.3 %    MCV 89.8 79.6 - 97.8 FL    MCH 29.4 26.1 - 32.9 PG    MCHC 32.7 31.4 - 35.0 g/dL    RDW 15.0 (H) 11.9 - 14.6 %    PLATELET 735 855 - 732 K/uL    MPV 10.1 9.4 - 12.3 FL    ABSOLUTE NRBC 0.04 0.0 - 0.2 K/uL    DF AUTOMATED      NEUTROPHILS 89 (H) 43 - 78 %    LYMPHOCYTES 5 (L) 13 - 44 %    MONOCYTES 5 4.0 - 12.0 %    EOSINOPHILS 0 (L) 0.5 - 7.8 %    BASOPHILS 0 0.0 - 2.0 %    IMMATURE GRANULOCYTES 1 0.0 - 5.0 %    ABS. NEUTROPHILS 8.9 (H) 1.7 - 8.2 K/UL    ABS. LYMPHOCYTES 0.5 0.5 - 4.6 K/UL    ABS. MONOCYTES 0.5 0.1 - 1.3 K/UL    ABS. EOSINOPHILS 0.0 0.0 - 0.8 K/UL    ABS. BASOPHILS 0.0 0.0 - 0.2 K/UL    ABS. IMM.  GRANS. 0.1 0.0 - 0.5 K/UL   METABOLIC PANEL, BASIC    Collection Time: 02/09/22  3:55 AM   Result Value Ref Range    Sodium 137 136 - 145 mmol/L    Potassium 4.0 3.5 - 5.1 mmol/L    Chloride 103 98 - 107 mmol/L    CO2 28 21 - 32 mmol/L    Anion gap 6 (L) 7 - 16 mmol/L    Glucose 182 (H) 65 - 100 mg/dL    BUN 19 8 - 23 MG/DL    Creatinine 0.80 0.6 - 1.0 MG/DL    GFR est AA >60 >60 ml/min/1.73m2    GFR est non-AA >60 >60 ml/min/1.73m2    Calcium 9.4 8.3 - 10.4 MG/DL   EKG, 12 LEAD, INITIAL    Collection Time: 02/09/22  4:45 AM   Result Value Ref Range    Ventricular Rate 97 BPM    Atrial Rate 97 BPM    P-R Interval 150 ms    QRS Duration 78 ms    Q-T Interval 348 ms    QTC Calculation (Bezet) 441 ms    Calculated P Axis 86 degrees    Calculated R Axis 69 degrees    Calculated T Axis 92 degrees    Diagnosis       Sinus rhythm with Premature atrial complexes  Abnormal ECG  When compared with ECG of 09-FEB-2022 04:43,  Fusion complexes are no longer Present  Premature ventricular complexes are no longer Present  Premature atrial complexes are now Present  Confirmed by Mohamud Sands [id number]) on 2/9/2022 7:05:33 AM     TROPONIN-HIGH SENSITIVITY    Collection Time: 02/09/22 11:11 AM   Result Value Ref Range    Troponin-High Sensitivity 3,869.5 (HH) 0 - 14 pg/mL   NT-PRO BNP    Collection Time: 02/09/22 11:11 AM   Result Value Ref Range    NT pro-BNP 21,522 (H) 5 - 125 PG/ML   CBC WITH AUTOMATED DIFF    Collection Time: 02/10/22  3:06 AM   Result Value Ref Range    WBC 8.0 4.3 - 11.1 K/uL    RBC 3.05 (L) 4.05 - 5.2 M/uL    HGB 8.8 (L) 11.7 - 15.4 g/dL    HCT 28.1 (L) 35.8 - 46.3 %    MCV 92.1 79.6 - 97.8 FL    MCH 28.9 26.1 - 32.9 PG    MCHC 31.3 (L) 31.4 - 35.0 g/dL    RDW 14.9 (H) 11.9 - 14.6 %    PLATELET 050 036 - 863 K/uL    MPV 10.1 9.4 - 12.3 FL    ABSOLUTE NRBC 0.07 0.0 - 0.2 K/uL    DF AUTOMATED      NEUTROPHILS 83 (H) 43 - 78 %    LYMPHOCYTES 6 (L) 13 - 44 %    MONOCYTES 9 4.0 - 12.0 %    EOSINOPHILS 0 (L) 0.5 - 7.8 %    BASOPHILS 0 0.0 - 2.0 %    IMMATURE GRANULOCYTES 1 0.0 - 5.0 %    ABS. NEUTROPHILS 6.7 1.7 - 8.2 K/UL    ABS. LYMPHOCYTES 0.5 0.5 - 4.6 K/UL    ABS. MONOCYTES 0.8 0.1 - 1.3 K/UL    ABS. EOSINOPHILS 0.0 0.0 - 0.8 K/UL    ABS. BASOPHILS 0.0 0.0 - 0.2 K/UL    ABS. IMM.  GRANS. 0.1 0.0 - 0.5 K/UL   METABOLIC PANEL, BASIC    Collection Time: 02/10/22  3:06 AM   Result Value Ref Range    Sodium 135 (L) 136 - 145 mmol/L    Potassium 4.3 3.5 - 5.1 mmol/L    Chloride 102 98 - 107 mmol/L    CO2 32 21 - 32 mmol/L    Anion gap 1 (L) 7 - 16 mmol/L    Glucose 121 (H) 65 - 100 mg/dL    BUN 24 (H) 8 - 23 MG/DL    Creatinine 0.80 0.6 - 1.0 MG/DL    GFR est AA >60 >60 ml/min/1.73m2 GFR est non-AA >60 >60 ml/min/1.73m2    Calcium 9.0 8.3 - 10.4 MG/DL   EKG, 12 LEAD, INITIAL    Collection Time: 02/10/22  9:31 AM   Result Value Ref Range    Ventricular Rate 84 BPM    Atrial Rate 84 BPM    P-R Interval 146 ms    QRS Duration 82 ms    Q-T Interval 380 ms    QTC Calculation (Bezet) 449 ms    Calculated P Axis 83 degrees    Calculated R Axis 71 degrees    Calculated T Axis 90 degrees    Diagnosis       Sinus rhythm with Premature atrial complexes  Marked ST abnormality, possible inferior subendocardial injury  Abnormal ECG  When compared with ECG of 09-FEB-2022 04:45,  No significant change was found  Confirmed by Karolyn Cobb ([id number]) on 2/10/2022 10:52:43 AM     COVID-19 RAPID TEST    Collection Time: 02/10/22  2:09 PM   Result Value Ref Range    Specimen source NASAL      COVID-19 rapid test Not detected NOTD           Condition on Admission: good        Assessment:     The Post Assessment Physician Evaluation (JEANNIE) found the current functional status to be comparable with the pre-admission screening. The patient is a good candidate for acute inpatient rehabilitation. Nothing since the pre-admission screen has changed that determination. Rehabilitation Plan  The patient has shown the ability to tolerate and benefit from 3 hours of therapy daily and is being admitted to a comprehensive acute inpatient rehabilitation program consisting of at least 3 hours of combined physical and occupational therapies. - Begin intensive Physical Therapy for a minimum of 1.5 hours a day, at least 5 out of 7 days per week to address bed mobility, transfers, ambulation, strengthening, balance, and endurance. - Begin intensive Occupational Therapy for a minimum of 1.5 hours a day, at least 5 out of 7 days per week to address ADLs (bathing, LE dressing, toileting) and adaptive equipment as needed. -  therapy schedule may be adjusted by MD based on patient's needs.   Each of these therapies will be continued as above for the duration of the inpatient rehab stay. The patient will also require 24-hour skilled rehabilitation nursing for bowel and bladder management, skin care for decubitus ulcer prevention, pain management and ongoing medication administration. S/p right IT fx s/p Gamma nail fixation with post op acute hypoxic respiratory failure , CAD s/p cardiac cath and stent  Plan / Recommendations / Medical Decision Making:     Daily physician / PA medical management:    rt hip fracture - PT/OT /wbat    thyroid profile was checked and she was found to have hyperthyroidism. Started on methimazole. CAD with acute chest pain; cardiac catheterization on February 7 and she is status post PCI to the LAD diagonal and to the distal right coronary artery. severe MR; follow up with Cardiology; cont Effient    Chronic anemia with ABLA; hgb 8.8< 9.2>8.9>7.1; stable    HLD ;cont zetia/ lipitor 80 mg daily     Severe copd; cont steroids ( 40 mg x 3d, then 20mg daily x 3d, then 10mg x 2d then stop ) and O2 supplementation; cont nebs; cont mucines    Hypertension - BP fluctuating, managed medically. Hypotensive earlier today; nitro past and ace inhibitor discontinued. Pain management - . Will require regular pain assessment and comprehensive pain management. Pneumonia prophylaxis - incentive spirometer every hour while awake. On Levaquin until 2/13; CXR 2/9 without acute findings. DVT risk / DVT prophylaxis - daily physician / PA exam to assess as patient is at increased risk for of thromboembolism. Mobilize as tolerated. Sequential pneumatic compression devices (SCDs) when in bed; thigh-high or knee-high thromboembolic deterrent hose when out of bed. On Effient    GI prophylaxis - resume PPI. At times may need additional antacids, Maalox prn. Depression - ? Pt with anxiety that is apparent.  Prn benzo    General skin care / wound prevention - monitor right incision and general skin wound status daily per staff and physician / PA. At risk for failure due to impaired mobility    Bladder program / urinary retention / neurogenic bladder - schedule voids q 6-8 hrs. Check post-void residual every shift and as needed; in-and-out catheter if post-void residual is more than 400ml. Bowel program - at risk for constipation as a side effect of opioids, other medications, impaired mobility, etc. MiraLAX daily for regularity, Marisela-Colace for stool softener. PRN MOM, bisacodyl suppository or tablets for constipation. Disposition: The patient's prognosis for significant practical improvement within a reasonable period of time appears good and the estimated length of stay is 14 days; patient is expected to return home with spouse / family supervision and continued rehabilitation with home health therapy. Given the patient's complex neurologic / medical condition, risks of further medical complications include but are not limited to: thromboembolism / pulmonary embolism, skin breakdown, pneumonia due to decreased mobility, CVA, MI, cardiac arrhythmias due to HTN, postural hypotension. For these ongoing medical issues, rehabilitation services could not be safely provided at a lower level of care such as a skilled nursing facility or nursing home.         Signed By: Harris Vaughn MD     February 10, 2022

## 2022-02-10 NOTE — DISCHARGE SUMMARY
Hospitalist Discharge Summary   Admit Date:  2/3/2022 10:44 PM   DC Note date: 2/10/2022  Name:  Damian Castillo   Age:  66 y.o.   Sex:  female  :  1950   MRN:  [id number]   Room:  [facility]  PCP:  Christiano Varner MD    Presenting Complaint: Hip Injury    Initial Admission Diagnosis: Other fracture of right femur, initial encounter for closed fracture [facility]) [S72.8X1A]     Problem List for this Hospitalization:  Hospital Problems as of 2/10/2022 Date Reviewed: 2022          Codes Class Noted - Resolved POA    COPD with acute lower respiratory infection ([facility] ICD-10-CM: J44.0  ICD-9-CM: 496, 519.8  2/10/2022 - Present Yes        Moderate to severe mitral regurgitation ICD-10-CM: I34.0  ICD-9-CM: 424.0  2022 - Present Yes        Iron deficiency anemia due to chronic blood loss (Chronic) ICD-10-CM: D50.0  ICD-9-CM: 280.0  2022 - Present Yes        Closed right hip fracture ([facility] ICD-10-CM: S72.001A  ICD-9-CM: 820.8  2022 - Present Yes        * (Principal) Other fracture of right femur, initial encounter for closed fracture ([facility] ICD-10-CM: W35.8J6B  ICD-9-CM: 821.00  2022 - Present Yes        Pulmonary mass ICD-10-CM: R91.8  ICD-9-CM: 786.6  2020 - Present Yes        Chronic respiratory failure with hypoxia (HCC) (Chronic) ICD-10-CM: J96.11  ICD-9-CM: 518.83, 799.02  2019 - Present Yes        CAD S/P percutaneous coronary angioplasty ICD-10-CM: I25.10, Z98.61  ICD-9-CM: 414.01, V45.82  2019 - Present Yes        Hyperlipidemia (Chronic) ICD-10-CM: E78.5  ICD-9-CM: 272.4  10/10/2016 - Present Yes        Essential hypertension, benign (Chronic) ICD-10-CM: I10  ICD-9-CM: 401.1  10/10/2016 - Present Yes        Acute respiratory failure with hypoxia (HCC) ICD-10-CM: J96.01  ICD-9-CM: 518.81  2013 - Present Yes        COPD, very severe ([facility] (Chronic) ICD-10-CM: J44.9  ICD-9-CM: 496  2013 - Present Yes            Did Patient have Sepsis (YES OR NO): no    Admission HPI from 2/3/2022:  \" Andre Wood is a 66 y.o. female admitted for Other fracture of right femur, initial encounter for closed fracture ([address] [place]. She has a h/o COPD on 1L O2 at night (smoked 1/2 ppd from age 11 until quit 2019),lymph edema (resolved w lymph massage) and CAD w multiple prior stents, [facility] 5-2019 w patent stents in LAD, chronically occluded L circ, subtotal occlusion of stents in RCA s/p PCI. She was admitted 2-3 after a fall w a R hip fracture. Pt has not missed any meds. She has a T12 cx fx that is very painful to her, particularly with bending over. The pain from her back radiates to her axilla, is a \"cold feeling\", without SOB, n/v or diaphoresis, not exertional, but gets better w nitro which she is using once a day. She has rare dizziness w standing, better when she bends her head forward. No syncope. No symptoms at the time of fall, she was bending over to  a coffee cup and lurched when she almost dropped it and fell on her R leg. No LE edema or weight gain.      In ER WBC 7, hgb 11.5, , K 3.6, cr .90, HS trop 22 and 42, /103, rapid covid negative, CXR COPD and CAD, EKG NSR w rate 85 w NSST changes unchanged from prior EKG. Currently on prednisone for COPD. .    Soc: From [place], [place], worked in [employer], smoked 1/2 ppd from age 11 until quit 2019  FH: Adopted- unknown  COVID: vax x 2 w booster  \"    Hospital Course:  admitted after mechanical fall and found to have hip fracture. After surgery, she started having chest pain. Ultimately she underwent cardiac catheterization on February 7 and she is status post PCI to the LAD diagonal and to the distal right coronary artery. CT PE was negative for pulmonary embolism but showed pulmonary lesions. Given the timing of pulmonary reason, there is concern about pneumonia and she was started on Levaquin. She has had some productive cough supporting this.        thyroid profile was checked and she was found to have hyperthyroidism. Started on methimazole. She had some SOB yesterday which delayed discharge. Repeat CXR clear. Troponin downtrending. BNP elevated but sometimes this does not correlate clinically. She does not appear overloaded. No CHF on echo, just significant MR. I wonder if her episode was triggered by her elevated BP (better after lisinopril 10mg given; on altace at home), or perhaps by anxiety. She is feeling better today and o2 weaned down to baseline without lasix or other intervention. She does take demadex at home; will resume that and give her a dose of lasix 40mg here before going up to 9th floor rehab. Would repeat CT scan of chest in 1-2 months to re-assess possible pulmonary mass, after resolution of current PNA and discharge from rehab    Disposition: Rehab Facility  Diet: ADULT DIET Regular; No Salt Added (3-4 gm)  Code Status: Full Code    Follow Up Orders: Follow-up Appointments   Procedures    FOLLOW UP VISIT Appointment in: Two Weeks Staple removal in office. Call  [phone] for appointment     Staple removal in office. Call  [phone] for appointment     Standing Status:   Standing     Number of Occurrences:   1     Order Specific Question:   Appointment in     Answer: Two Weeks       Follow-up Information     Follow up With Specialties Details Why Contact Info    [facility]  Please call to schedule your orientation to the [place] [id number] program when you determine you can participate. [address] [place] [address], [address], [address]   [address] [facility]  [phone]            Follow up labs/diagnostics (ultimately defer to outpatient provider):  CBC, BMP, Mg    Time spent in patient discharge and coordination 35 minutes. Plan was discussed with pt. All questions answered.   Patient was stable at time of discharge. Instructions given to call a physician or return if any concerns. Discharge Info:   Current Discharge Medication List      START taking these medications    Details   methIMAzole (TAPAZOLE) 10 mg tablet Take 1 Tablet by mouth daily. Indications: overactive thyroid gland  Qty: 30 Tablet, Refills: 1  Start date: 2/11/2022      levoFLOXacin (LEVAQUIN) 750 mg tablet Take 1 Tablet by mouth every twenty-four (24) hours for 3 doses. Qty: 3 Tablet, Refills: 0  Start date: 2/10/2022, End date: 2/13/2022      prasugreL (EFFIENT) 10 mg tablet Take 1 Tablet by mouth daily. Qty: 30 Tablet, Refills: 1  Start date: 2/11/2022         CONTINUE these medications which have CHANGED    Details   predniSONE (DELTASONE) 20 mg tablet Take 40mg qday x3d, then 20mg qday x3d, then 10mg x2d, then stop. Qty: 10 Tablet, Refills: 0  Start date: 2/10/2022      !! nitroglycerin (NITROSTAT) 0.4 mg SL tablet 1 Tablet by SubLINGual route three (3) times daily as needed for Chest Pain. Up to 3 doses. Qty: 61 Each, Refills: 11  Start date: 2/4/2022       !! - Potential duplicate medications found. Please discuss with provider. CONTINUE these medications which have NOT CHANGED    Details   roflumilast (Daliresp) 500 mcg tab tablet Take 1 Tablet by mouth daily. Qty: 90 Tablet, Refills: 3      ezetimibe (ZETIA) 10 mg tablet Take 1 Tablet by mouth daily. Qty: 90 Tablet, Refills: 3    Associated Diagnoses: Ischemic heart disease      budesonide-formoteroL (Symbicort) 160-4.5 mcg/actuation HFAA Take 2 Puffs by inhalation two (2) times a day. Qty: 3 Each, Refills: 3      metoprolol succinate (TOPROL-XL) 100 mg tablet Take 1 Tablet by mouth daily. Qty: 90 Tablet, Refills: 3    Associated Diagnoses: Ischemic heart disease      ramipriL (ALTACE) 5 mg capsule Take 1 Capsule by mouth daily.   Qty: 90 Capsule, Refills: 3    Associated Diagnoses: Ischemic heart disease      albuterol (PROVENTIL HFA, VENTOLIN HFA, PROAIR HFA) 90 mcg/actuation inhaler Take 2 Puffs by inhalation every four (4) hours as needed for Wheezing or Shortness of Breath. Qty: 3 Inhaler, Refills: 3      torsemide (DEMADEX) 10 mg tablet Take 1 Tablet by mouth daily. Qty: 30 Tablet, Refills: 5      tiotropium (Spiriva with HandiHaler) 18 mcg inhalation capsule Take 1 Cap by inhalation daily. Qty: 90 Cap, Refills: 3      OTHER Handicap placard  Qty: 1 Each, Refills: 0      multivitamin/folic acid/biotin (HAIR-SKIN-NAILS, MV-FA-BIOTIN, PO) Take  by mouth.      pantoprazole (PROTONIX) 40 mg tablet TAKE 1 TABLET BY MOUTH TWICE DAILY  Qty: 180 Tab, Refills: 0      OXYGEN-AIR DELIVERY SYSTEMS by Does Not Apply route. 1lpm qhs       !! nitroglycerin (NITROSTAT) 0.4 mg SL tablet 1 Tab by SubLINGual route every five (5) minutes as needed for Chest Pain. Qty: 1 Bottle, Refills: 12    Associated Diagnoses: Coronary artery disease involving native coronary artery without angina pectoris      multivitamin (ONE A DAY) tablet Take 1 Tablet by mouth daily. guaiFENesin ER (MUCINEX) 600 mg ER tablet Take 600 mg by mouth two (2) times daily as needed. aspirin delayed-release 81 mg tablet Take  by mouth daily. atorvastatin (LIPITOR) 80 mg tablet TAKE 1 TABLET BY MOUTH DAILY  Qty: 90 Tablet, Refills: 3    Associated Diagnoses: Ischemic heart disease      albuterol (PROVENTIL VENTOLIN) 2.5 mg /3 mL (0.083 %) nebu 3 mL by Nebulization route every six (6) hours as needed for Shortness of Breath. ICD-10-J44.9, please bill to Med B  Qty: 360 mL, Refills: 11      cetirizine (ZYRTEC) 10 mg tablet Take  by mouth daily as needed. !! - Potential duplicate medications found. Please discuss with provider.       STOP taking these medications       azithromycin (ZITHROMAX) 500 mg tab Comments:   Reason for Stopping:         clopidogreL (PLAVIX) 75 mg tab Comments:   Reason for Stopping:               Procedures done this admission:  Procedure(s):  LEFT HEART CATH / CORONARY ANGIOGRAPHY  PERCUTANEOUS CORONARY INTERVENTION    Consults this admission:  IP CONSULT TO ORTHOPEDIC SURGERY  IP CONSULT TO CARDIOLOGY  IP CONSULT TO CARDIOLOGY  IP CONSULT TO PHYSIATRIST(REHAB MEDICINE)  IP CONSULT TO PULMONOLOGY    Echocardiogram/EKG results:  Results from Hospital Encounter encounter on 02/03/22    ECHO ADULT COMPLETE    Interpretation Summary    Left Ventricle: Left ventricle size is normal. Normal wall thickness. Normal wall motion. Low normal left ventricular systolic function. EF by 2D Simpsons Biplane is 52%. Normal diastolic function.   Aortic Valve: Mildly thickened cusps.   Mitral Valve: Mildly thickened leaflets. Mildly calcified anterior leaflet. Moderate to severe transvalvular regurgitation with an eccentrically directed jet and may underestimate severity. Transesophageal echo  recommended for further evaluation of potentially severe mitral regurgitation.   Aorta: Dilated annulus.        EKG Results     Procedure 720 Value Units Date/Time    EKG, 12 LEAD, INITIAL [[id number]] Collected: 02/10/22 0931    Order Status: Completed Updated: 02/10/22 1052     Ventricular Rate 84 BPM      Atrial Rate 84 BPM      P-R Interval 146 ms      QRS Duration 82 ms      Q-T Interval 380 ms      QTC Calculation (Bezet) 449 ms      Calculated P Axis 83 degrees      Calculated R Axis 71 degrees      Calculated T Axis 90 degrees      Diagnosis --     Sinus rhythm with Premature atrial complexes  Marked ST abnormality, possible inferior subendocardial injury  Abnormal ECG  When compared with ECG of 09-FEB-2022 04:45,  No significant change was found  Confirmed by Katie Cervantes ([id number]) on 2/10/2022 10:52:43 AM      EKG, 12 LEAD, INITIAL [[id number]] Collected: 02/09/22 0445    Order Status: Completed Updated: 02/09/22 0705     Ventricular Rate 97 BPM      Atrial Rate 97 BPM      P-R Interval 150 ms      QRS Duration 78 ms      Q-T Interval 348 ms      QTC Calculation (Bezet) 441 ms Calculated P Axis 86 degrees      Calculated R Axis 69 degrees      Calculated T Axis 92 degrees      Diagnosis --     Sinus rhythm with Premature atrial complexes  Abnormal ECG  When compared with ECG of 09-FEB-2022 04:43,  Fusion complexes are no longer Present  Premature ventricular complexes are no longer Present  Premature atrial complexes are now Present  Confirmed by Alma Dias ([id number]) on 2/9/2022 7:05:33 AM      EKG, 12 LEAD, SUBSEQUENT [[id number]]     Order Status: Canceled     EKG, 12 LEAD, SUBSEQUENT [[id number]] Collected: 02/06/22 0724    Order Status: Completed Updated: 02/06/22 0825     Ventricular Rate 112 BPM      Atrial Rate 112 BPM      P-R Interval 150 ms      QRS Duration 82 ms      Q-T Interval 354 ms      QTC Calculation (Bezet) 483 ms      Calculated P Axis 85 degrees      Calculated R Axis 60 degrees      Calculated T Axis -134 degrees      Diagnosis --     Sinus tachycardia  Marked ST abnormality, possible inferior subendocardial injury  Abnormal ECG    Confirmed by Alma Dias ([id number]) on 2/6/2022 8:25:13 AM      EKG, 12 LEAD, INITIAL [[id number]] Collected: 02/06/22 0531    Order Status: Completed Updated: 02/06/22 0824     Ventricular Rate 154 BPM      Atrial Rate 154 BPM      P-R Interval 162 ms      QRS Duration 94 ms      Q-T Interval 288 ms      QTC Calculation (Bezet) 461 ms      Calculated P Axis 77 degrees      Calculated R Axis 59 degrees      Calculated T Axis -130 degrees      Diagnosis --     Sinus tachycardia  ST depression, consider subendocardial injury  Abnormal ECG    Confirmed by Alma Dias ([id number]) on 2/6/2022 8:24:42 AM      EKG, 12 LEAD, INITIAL [[id number]] Collected: 02/03/22 2347    Order Status: Completed Updated: 02/04/22 1653     Ventricular Rate 67 BPM      Atrial Rate 67 BPM      P-R Interval 172 ms      QRS Duration 91 ms      Q-T Interval 383 ms      QTC Calculation (Bezet) 405 ms      Calculated P Axis 86 degrees      Calculated R Axis 75 degrees Diagnosis --     Sinus rhythm  EDUARDO, consider biatrial enlargement  Anteroseptal infarct, old  ST depression in inferolateral leads- consider ischemia    Confirmed by Sander Hernandez MD (), RJ HOOKS ([id number]) on 2/4/2022 4:53:47 PM            Diagnostic Imaging/Tests:   XR CHEST SNGL V    Result Date: 2/3/2022  EXAM: Chest x-ray. INDICATION: Presurgical evaluation. Hip fracture. COMPARISON: Prior CT chest on October 8, 2021. TECHNIQUE: 2 frontal views of the chest were obtained. FINDINGS: Again noted are emphysematous changes in the lungs with stable scarring or nodularity in the left midlung. No acute infiltrate is seen. The heart size, mediastinal contour and pulmonary vasculature are normal. There are coronary artery calcifications or stents. No pneumothorax, pleural effusion or acute displaced fracture is seen. 1. No acute process. 2. Emphysema and coronary artery disease. XR PELV AP ONLY    Result Date: 2/4/2022  SINGLE VIEW PORTABLE PELVIS, February 4, 2022 at 1232 hours: CLINICAL HISTORY:  Followup right hip repair today. COMPARISON:  Intraoperative images today and injury yesterday. FINDINGS:  AP radiograph of the lower pelvis and hips demonstrates right lateral skin staples and soft tissue gas. Intramedullary nathaniel and compression screw transfix the right intertrochanteric fracture. Multiple fragments are in adequate alignment in the AP projection. No new fracture, malalignment, or aggressive bone destruction is evident. RIGHT INTERTROCHANTERIC FRACTURE STATUS POST ORIF. XR HIP RT W OR WO PELV 2-3 VWS    Result Date: 2/3/2022  EXAM: Pelvis and right hip x-rays. INDICATION: Pain, injury. COMPARISON: None. TECHNIQUE: A frontal view of the pelvis was supplemented with 2 dedicated views of the right hip joint. FINDINGS: There is an impacted right femur intertrochanteric fracture. No other fractures are identified. The pelvic ring is intact.  There is moderate right and mild left hip joint osteoarthritis. Right femur intertrochanteric fracture. XR FEMUR RT 2 VS    Result Date: 2/4/2022  4 VIEW PORTABLE RIGHT FEMUR[de-identified] CLINICAL HISTORY:  Intraoperative evaluation. TECHNIQUE:  Examination was monitored in the OR by Dr. Kristina Gomez. Fluoroscopy time was 32 seconds with 4 spot images presented. COMPARISON:  Right hip and femur yesterday. Carl Orellana FINDINGS:  These images are presented for review at 1250 hours. They demonstrate placement of an intramedullary nathaniel and compression screw transfixing the intertrochanteric fracture. Principal fragments are in adequate alignment. Fine bony detail is not rendered on these portable images. INTERTROCHANTERIC FRACTURE STATUS POST ORIF. XR FEMUR RT 2 VS    Result Date: 2/3/2022  EXAM: Right femur x-rays. INDICATION: Pain, injury. COMPARISON: Subsequent dedicated right hip x-rays. TECHNIQUE: 4 views. FINDINGS: As noted on hip x-ray report, there is an impacted right femur intertrochanteric fracture. No fractures are seen in the more distal right femur. Vascular calcifications are noted in the soft tissues. As above. [place] XR TECHNOLOGIST SERVICE    Result Date: 2/4/2022  Administrative Report Fluoroscopy was provided in the operating room. Images may have been saved as a reference for the surgical procedure. The operative report can be viewed in [address] [place] [address] and/or retrieved by the Medical Records department.      A Radiologist has not reviewed images associated with this exam.      All Micro Results     Procedure Component Value Units Date/Time    CULTURE, BLOOD [[id number]] Collected: 02/06/22 1954    Order Status: Completed Specimen: Blood Updated: 02/10/22 0742     Special Requests: LEFT PICC LINE        Culture result: NO GROWTH 4 DAYS       CULTURE, BLOOD [[id number]] Collected: 02/06/22 1400    Order Status: Completed Specimen: Blood Updated: 02/10/22 0742     Special Requests: --        RIGHT  HAND       Culture result: NO GROWTH 4 DAYS       CULTURE, RESPIRATORY/SPUTUM/BRONCH [address] STAIN [[id number]] Collected: 02/08/22 0233    Order Status: Completed Specimen: Sputum Updated: 02/10/22 0731     Special Requests: NO SPECIAL REQUESTS        GRAM STAIN 0 TO 3 WBCS PER OIF      0 TO 1  EPITHELIAL CELLS SEEN            MODERATE GRAM POSITIVE COCCI            4+ MUCUS PRESENT        Culture result:       MODERATE NORMAL RESPIRATORY KELLY          RESPIRATORY VIRUS PANEL W/COVID-19, PCR [[id number]] Collected: 02/06/22 1844    Order Status: Completed Specimen: Nasopharyngeal Updated: 02/06/22 2135     Adenovirus NOT DETECTED        Coronavirus 229E NOT DETECTED        Coronavirus HKU1 NOT DETECTED        Coronavirus CVNL63 NOT DETECTED        Coronavirus OC43 NOT DETECTED        SARS-CoV-2, PCR NOT DETECTED        Metapneumovirus NOT DETECTED        Rhinovirus and Enterovirus NOT DETECTED        Influenza A NOT DETECTED        Influenza B NOT DETECTED        Parainfluenza 1 NOT DETECTED        Parainfluenza 2 NOT DETECTED        Parainfluenza 3 NOT DETECTED        Parainfluenza virus 4 NOT DETECTED        RSV by PCR NOT DETECTED        B. parapertussis, PCR NOT DETECTED        Bordetella pertussis - PCR NOT DETECTED        Chlamydophila pneumoniae DNA, QL, PCR NOT DETECTED        Mycoplasma pneumoniae DNA, QL, PCR NOT DETECTED       MSSA/MRSA SC BY PCR, NASAL SWAB [[id number]] Collected: 02/04/22 0315    Order Status: Completed Specimen: Nasal swab Updated: 02/04/22 4851     Special Requests: NO SPECIAL REQUESTS        Culture result:       SA target not detected. A MRSA NEGATIVE, SA NEGATIVE test result does not preclude MRSA or SA nasal colonization.           COVID-19 RAPID TEST [[id number]] Collected: 02/03/22 2343    Order Status: Completed Specimen: Nasopharyngeal Updated: 02/04/22 0037     Specimen source NASAL        COVID-19 rapid test Not detected        Comment:      The specimen is NEGATIVE for SARS-CoV-2, the novel coronavirus associated with COVID-19. A negative result does not rule out COVID-19. This test has been authorized by the FDA under an Emergency Use Authorization (EUA) for use by authorized laboratories. Fact sheet for Healthcare Providers: ConventionUpdate.co.nz  Fact sheet for Patients: ConventionUpdate.co.nz       Methodology: Isothermal Nucleic Acid Amplification               Labs: Results:       BMP, Mg, Phos Recent Labs     02/10/22  0306 02/09/22  0355 02/08/22  0422 02/07/22  1707   * 137 140  --    K 4.3 4.0 4.0  --     103 105  --    CO2 32 28 30  --    AGAP 1* 6* 5*  --    BUN 24* 19 18  --    CREA 0.80 0.80 0.80  --    CA 9.0 9.4 9.2  --    * 182* 152*  --    MG  --   --   --  2.1      CBC Recent Labs     02/10/22  0306 02/09/22  0355 02/08/22  2244 02/08/22  0422 02/08/22  0422   WBC 8.0 9.9  --   --  10.0   RBC 3.05* 3.13*  --   --  2.45*   HGB 8.8* 9.2* 8.9*   < > 7.1*   HCT 28.1* 28.1* 27.7*   < > 22.8*    242  --   --  214   GRANS 83* 89*  --   --   --    LYMPH 6* 5*  --   --   --    EOS 0* 0*  --   --   --    MONOS 9 5  --   --   --    BASOS 0 0  --   --   --    IG 1 1  --   --   --    ANEU 6.7 8.9*  --   --   --    ABL 0.5 0.5  --   --   --    SANDHYA 0.0 0.0  --   --   --    ABM 0.8 0.5  --   --   --    ABB 0.0 0.0  --   --   --    AIG 0.1 0.1  --   --   --     < > = values in this interval not displayed.       LFT Recent Labs     02/08/22 0422   ALT 29   AP 57   TP 6.0*   ALB 2.5*   GLOB 3.5   AGRAT 0.7*      Cardiac Testing Lab Results   Component Value Date/Time     (H) 06/06/2019 06:57 PM     08/21/2013 06:25 PM    BNP 59 08/19/2013 04:31 PM    BNP 24 08/19/2013 10:50 AM    Troponin-I, Qt. 0.16 () 06/07/2019 03:52 PM    Troponin-I, Qt. 0.21 () 06/07/2019 10:46 AM    Troponin-I, Qt. 0.25 () 06/07/2019 04:52 AM      Coagulation Tests Lab Results   Component Value Date/Time    Prothrombin time 13.4 05/29/2019 08:52 AM    INR 1.1 05/29/2019 08:52 AM    aPTT 28.8 02/06/2022 06:46 PM      A1c No results found for: HBA1C, TPV8ADTT, JCH2ZQVY   Lipid Panel Lab Results   Component Value Date/Time    Cholesterol, total 151 07/01/2019 07:20 AM    HDL Cholesterol 65 (H) 07/01/2019 07:20 AM    LDL, calculated 72.8 07/01/2019 07:20 AM    VLDL, calculated 13.2 07/01/2019 07:20 AM    Triglyceride 66 07/01/2019 07:20 AM    CHOL/HDL Ratio 2.3 07/01/2019 07:20 AM      Thyroid Panel Lab Results   Component Value Date/Time    TSH 0.101 (L) 02/07/2022 05:07 PM    TSH 1.500 08/30/2018 10:13 AM    T4, Free 2.4 (H) 02/07/2022 05:07 PM    T4, Free 1.35 10/08/2015 12:03 PM        Most Recent UA Lab Results   Component Value Date/Time    Color YELLOW 06/07/2019 01:20 AM    Appearance CLOUDY 06/07/2019 01:20 AM    Specific gravity 1.016 06/07/2019 01:20 AM    pH (UA) 5.5 06/07/2019 01:20 AM    Protein NEGATIVE  06/07/2019 01:20 AM    Glucose NEGATIVE  06/07/2019 01:20 AM    Ketone 15 (A) 06/07/2019 01:20 AM    Bilirubin NEGATIVE  06/07/2019 01:20 AM    Blood NEGATIVE  06/07/2019 01:20 AM    Urobilinogen 0.2 06/07/2019 01:20 AM    Nitrites NEGATIVE  06/07/2019 01:20 AM    Leukocyte Esterase SMALL (A) 06/07/2019 01:20 AM    WBC 3-5 06/07/2019 01:20 AM    RBC 0-3 06/07/2019 01:20 AM    Epithelial cells 0-3 06/07/2019 01:20 AM    Bacteria 0 06/07/2019 01:20 AM    Casts 0-3 06/07/2019 01:20 AM          All Labs from Last 24 Hrs:  Recent Results (from the past 24 hour(s))   CBC WITH AUTOMATED DIFF    Collection Time: 02/10/22  3:06 AM   Result Value Ref Range    WBC 8.0 4.3 - 11.1 K/uL    RBC 3.05 (L) 4.05 - 5.2 M/uL    HGB 8.8 (L) 11.7 - 15.4 g/dL    HCT 28.1 (L) 35.8 - 46.3 %    MCV 92.1 79.6 - 97.8 FL    MCH 28.9 26.1 - 32.9 PG    MCHC 31.3 (L) 31.4 - 35.0 g/dL    RDW 14.9 (H) 11.9 - 14.6 %    PLATELET 041 896 - 550 K/uL    MPV 10.1 9.4 - 12.3 FL    ABSOLUTE NRBC 0.07 0.0 - 0.2 K/uL    DF AUTOMATED      NEUTROPHILS 83 (H) 43 - 78 %    LYMPHOCYTES 6 (L) 13 - 44 %    MONOCYTES 9 4.0 - 12.0 %    EOSINOPHILS 0 (L) 0.5 - 7.8 %    BASOPHILS 0 0.0 - 2.0 %    IMMATURE GRANULOCYTES 1 0.0 - 5.0 %    ABS. NEUTROPHILS 6.7 1.7 - 8.2 K/UL    ABS. LYMPHOCYTES 0.5 0.5 - 4.6 K/UL    ABS. MONOCYTES 0.8 0.1 - 1.3 K/UL    ABS. EOSINOPHILS 0.0 0.0 - 0.8 K/UL    ABS. BASOPHILS 0.0 0.0 - 0.2 K/UL    ABS. IMM.  GRANS. 0.1 0.0 - 0.5 K/UL   METABOLIC PANEL, BASIC    Collection Time: 02/10/22  3:06 AM   Result Value Ref Range    Sodium 135 (L) 136 - 145 mmol/L    Potassium 4.3 3.5 - 5.1 mmol/L    Chloride 102 98 - 107 mmol/L    CO2 32 21 - 32 mmol/L    Anion gap 1 (L) 7 - 16 mmol/L    Glucose 121 (H) 65 - 100 mg/dL    BUN 24 (H) 8 - 23 MG/DL    Creatinine 0.80 0.6 - 1.0 MG/DL    GFR est AA >60 >60 ml/min/1.73m2    GFR est non-AA >60 >60 ml/min/1.73m2    Calcium 9.0 8.3 - 10.4 MG/DL   EKG, 12 LEAD, INITIAL    Collection Time: 02/10/22  9:31 AM   Result Value Ref Range    Ventricular Rate 84 BPM    Atrial Rate 84 BPM    P-R Interval 146 ms    QRS Duration 82 ms    Q-T Interval 380 ms    QTC Calculation (Bezet) 449 ms    Calculated P Axis 83 degrees    Calculated R Axis 71 degrees    Calculated T Axis 90 degrees    Diagnosis       Sinus rhythm with Premature atrial complexes  Marked ST abnormality, possible inferior subendocardial injury  Abnormal ECG  When compared with ECG of 09-FEB-2022 04:45,  No significant change was found  Confirmed by Deanna Pryor ([id number]) on 2/10/2022 10:52:43 AM         Current Med List in Hospital:   Current Facility-Administered Medications   Medication Dose Route Frequency    furosemide (LASIX) tablet 40 mg  40 mg Oral ONCE    predniSONE (DELTASONE) tablet 40 mg  40 mg Oral DAILY WITH LUNCH    lisinopriL (PRINIVIL, ZESTRIL) tablet 10 mg  10 mg Oral DAILY    LORazepam (ATIVAN) tablet 0.5 mg  0.5 mg Oral Q4H PRN    labetaloL (NORMODYNE;TRANDATE) injection 20 mg  20 mg IntraVENous Q4H PRN    0.9% sodium chloride infusion 250 mL  250 mL IntraVENous PRN    methIMAzole (TAPAZOLE) tablet 10 mg  10 mg Oral DAILY    albuterol (PROVENTIL HFA, VENTOLIN HFA, PROAIR HFA) inhaler 2 Puff  2 Puff Inhalation Q6H PRN    levoFLOXacin (LEVAQUIN) tablet 750 mg  750 mg Oral Q24H    heparin (porcine) injection 5,000 Units  5,000 Units SubCUTAneous Q8H    nitroglycerin (NITROBID) 2 % ointment 1 Inch  1 Inch Topical BID    morphine injection 2 mg  2 mg IntraVENous Q4H PRN    acetaminophen (TYLENOL) tablet 650 mg  650 mg Oral Q8H    lidocaine 4 % patch 1 Patch  1 Patch TransDERmal Q24H    metoprolol tartrate (LOPRESSOR) tablet 50 mg  50 mg Oral Q6H    prasugreL (EFFIENT) tablet 10 mg  10 mg Oral DAILY    albuterol (PROVENTIL VENTOLIN) nebulizer solution 2.5 mg  2.5 mg Nebulization QID RT    albuterol (PROVENTIL VENTOLIN) nebulizer solution 2.5 mg  2.5 mg Nebulization Q4H PRN    albuterol-ipratropium (DUO-NEB) 2.5 MG-0.5 MG/3 ML  3 mL Nebulization Q6H PRN    guaiFENesin ER (MUCINEX) tablet 1,200 mg  1,200 mg Oral Q12H    budesonide (PULMICORT) 500 mcg/2 ml nebulizer suspension  1,000 mcg Nebulization BID RT    nitroglycerin (NITROSTAT) tablet 0.4 mg  0.4 mg SubLINGual PRN    atorvastatin (LIPITOR) tablet 80 mg  80 mg Oral QHS    aspirin delayed-release tablet 81 mg  81 mg Oral DAILY    [Held by provider] budesonide-formoteroL (SYMBICORT) 160-4.5 mcg/actuation HFA inhaler 2 Puff  2 Puff Inhalation BID RT    pantoprazole (PROTONIX) tablet 40 mg  40 mg Oral BID    tiotropium bromide (SPIRIVA RESPIMAT) 2.5 mcg /actuation  2 Puff Inhalation DAILY    alcohol 62% (NOZIN) nasal  1 Ampule  1 Ampule Topical Q12H    sodium chloride (NS) flush 5-40 mL  5-40 mL IntraVENous PRN    naloxone (NARCAN) injection 0.2-0.4 mg  0.2-0.4 mg IntraVENous Q10MIN PRN    senna-docusate (PERICOLACE) 8.6-50 mg per tablet 2 Tablet  2 Tablet Oral DAILY    ondansetron (ZOFRAN) injection 4 mg  4 mg IntraVENous Q4H PRN       Allergies   Allergen Reactions    Promethazine Nausea and Vomiting and Other (comments)     Severe vomiting      Immunization History   Administered Date(s) Administered    COVID-19, Pfizer Purple top, DILUTE for use, 12+ yrs, 30mcg/0.3mL dose 01/28/2021, 02/18/2021, 09/25/2021    Influenza High Dose Vaccine PF 09/23/2014, 10/08/2015, 10/10/2016, 09/26/2017, 09/25/2018    Influenza Vaccine 10/01/2012, 09/27/2013    Influenza Vaccine (Tri) Adjuvanted (>65 Yrs FLUAD TRI 65014) 09/20/2019    Influenza, High-dose, Quadrivalent (>65 Yrs Fluzone High Dose Quad 51837) 10/06/2020, 09/14/2021    Pneumococcal Conjugate (PCV-13) 09/23/2014    Pneumococcal Polysaccharide (PPSV-23) 01/01/2008, 09/23/2014, 08/30/2018    TB Skin Test (PPD) Intradermal 02/04/2022    Tdap 11/07/2018    Zoster Recombinant 03/27/2019, 06/18/2019       Recent Vital Data:  Patient Vitals for the past 24 hrs:   Temp Pulse Resp BP SpO2   02/10/22 1146 97.7 °F (36.5 °C) 78 20 128/60 96 %   02/10/22 0903 -- -- -- -- 94 %   02/10/22 0732 97.4 °F (36.3 °C) 74 20 132/74 99 %   02/10/22 0439 97.4 °F (36.3 °C) 78 20 131/70 96 %   02/10/22 0040 97.6 °F (36.4 °C) 80 20 126/66 100 %   02/09/22 2333 -- 74 -- 130/76 --   02/09/22 2328 -- -- -- -- 100 %   02/09/22 2035 97.8 °F (36.6 °C) 74 20 124/77 99 %   02/09/22 1745 97.7 °F (36.5 °C) 76 20 (!) 140/80 99 %     Oxygen Therapy  O2 Sat (%): 96 % (02/10/22 1146)  Pulse via Oximetry: 90 beats per minute (02/10/22 0903)  O2 Device: Nasal cannula (02/10/22 0903)  Skin Assessment: Clean, dry, & intact (02/10/22 0453)  Skin Protection for O2 Device: No (02/10/22 0453)  O2 Flow Rate (L/min): 2 l/min (02/10/22 0903)  FIO2 (%): 32 % (02/09/22 8177)    Estimated body mass index is 23.63 kg/m² as calculated from the following:    Height as of this encounter: 5' 8\" (1.727 m). Weight as of this encounter: 70.5 kg (155 lb 6.4 oz).     Intake/Output Summary (Last 24 hours) at 2/10/2022 1212  Last data filed at 2/10/2022 1020  Gross per 24 hour   Intake 370 ml   Output 400 ml   Net -30 ml         Physical Exam:  General:    Well nourished. No overt distress  Head:  Normocephalic, atraumatic  Eyes:  Sclerae appear normal.  Pupils equally round. HENT:  Nares appear normal, no drainage. Moist mucous membranes  Neck:  No restricted ROM. Trachea midline  CV:   RRR. No JVD  Lungs:   Diminished bilat. No wheezing. Even, unlabored  Abdomen:   Nondistended. Extremities: No cyanosis or clubbing. No edema. Skin:     No rashes. Normal coloration  Neuro:  CN II-XII grossly intact. Psych:  Normal mood and affect. Signed:  Nav Gold MD    Part of this note may have been written by using a voice dictation software. The note has been proof read but may still contain some grammatical/other typographical errors.

## 2022-02-10 NOTE — PROGRESS NOTES
ACUTE PHYSICAL THERAPY GOALS:  (Developed with and agreed upon by patient and/or caregiver.)  1. Pt will perform bed mobility Mod (I) c use of rails PRN in 7 therapy sessions. 2. Pt will perform sit-to-stand/ stand-to-sit transfers Mod (I) c use of LRAD in 7 therapy sessions. 3. Pt will ambulate 100 ft under (S) with use of LRAD and breaks as needed in 7 therapy sessions. 4. Pt will perform standing balance activities with minimal postural sway in 7 therapy sessions. 5. Pt will tolerate multiple sets and reps of BLE exercises in 7 therapy sessions. PHYSICAL THERAPY: Daily Note and AM Treatment Day # 4     WBAT ISATU Skinner is a 66 y.o. female   PRIMARY DIAGNOSIS: Other fracture of right femur, initial encounter for closed fracture ([facility] [address]  Other fracture of right femur, initial encounter for closed fracture ([facility] [address] [S72.8X1A]  Procedure(s) (LRB):  LEFT HEART CATH / CORONARY ANGIOGRAPHY (N/A)  PERCUTANEOUS CORONARY INTERVENTION (N/A)  3 Days Post-Op    ASSESSMENT:     REHAB RECOMMENDATIONS: CURRENT LEVEL OF FUNCTION:  (Most Recently Demonstrated)   Recommendation to date pending progress:  [address] [place] [address]  Equipment:    Rolling Walker Bed Mobility:   Maximal Assistance for R LE only  Sit to Stand:   Minimal Assistance to mod assist  Transfers:   Minimal Assistance  Gait/Mobility:   Minimal Assistance     ASSESSMENT:  Ms. Steph Carnes presents supine in bed, family present, and agrees to therapy. She performed bed mobility with min assist for R LE. She stood with min/mod assist and was able to stand a couple minutes then sat back down. She stood again and ambulated 3' to chair using rolling walker and min assist.  Cues for pushing thru UEs and pt was able to take a few steps vs shuffling/pivoting her foot. Slow pace for ambulation with rest break. She sat in chair and performed ankle pumps and LAQ. She requires additional time for all mobility.   She was left up in chair with needs in reach, family attending. She is making progress towards goals. Will continue with POC.       SUBJECTIVE:   Ms. Steph Carnes states, \"It feels good to sit up\"    SOCIAL HISTORY/ LIVING ENVIRONMENT: See [address]: Private residence  One/Two Story Residence: One story  Living Alone: No  Support Systems: Spouse/Significant Other,Child(raghavendra),Friend/Neighbor  OBJECTIVE:     PAIN: VITAL SIGNS: LINES/DRAINS:   Pre Treatment:    Post Treatment: 0      O2 Device: Nasal cannula     MOBILITY: I Mod I S SBA CGA Min Mod Max Total  NT x2 Comments:   Bed Mobility    Rolling [] [] [] [] [] [] [] [] [] [] []    Supine to Sit [] [] [] [] [] [x] [] [] [] [] []    Scooting [] [] [] [] [] [x] [] [] [] [] []    Sit to Supine [] [] [] [] [] [] [] [] [] [] []    Transfers    Sit to Stand [] [] [] [] [] [x] [x] [] [] [] []    Bed to Chair [] [] [] [] [] [x] [x] [] [] [] []    Stand to Sit [] [] [] [] [] [x] [x] [] [] [] []    I=Independent, Mod I=Modified Independent, S=Supervision, SBA=Standby Assistance, CGA=Contact Guard Assistance,   Min=Minimal Assistance, Mod=Moderate Assistance, Max=Maximal Assistance, Total=Total Assistance, NT=Not Tested    BALANCE: Good Fair+ Fair Fair- Poor NT Comments   Sitting Static [x] [] [] [] [] []    Sitting Dynamic [] [x] [] [] [] []              Standing Static [] [x] [] [] [] []    Standing Dynamic [] [x] [x] [] [] []      GAIT: I Mod I S SBA CGA Min Mod Max Total  NT x2 Comments:   Level of Assistance [] [] [] [] [] [x] [] [] [] [] []    Distance 3'    DME Rolling Walker    Gait Quality Slow pace, cues for pushing thru UEs, walker management      Weightbearing  Status WBAT, RLE     I=Independent, Mod I=Modified Independent, S=Supervision, SBA=Standby Assistance, CGA=Contact Guard Assistance,   Min=Minimal Assistance, Mod=Moderate Assistance, Max=Maximal Assistance, Total=Total Assistance, NT=Not Tested    PLAN:   FREQUENCY/DURATION: PT Plan of Care: BID for duration of hospital stay or until stated goals are met, whichever comes first.  TREATMENT:     TREATMENT:   ($$ Therapeutic Activity: 23-37 mins    )  Therapeutic Activity (27 Minutes): Therapeutic activity included Supine to Sit, Scooting, Transfer Training, Ambulation on level ground, Sitting balance , Standing balance and LE exs to improve functional Mobility, Strength, ROM and Activity tolerance.     TREATMENT GRID:   Date:  2/8 Date:  2/9/22 Date:  2/10/22   Activity/Exercise Parameters Parameters Parameters   Ankle pumps x10 B X 15 B X 15 B   Heel slides x10 AAB     abduction x10 AAB X 10 B AA    Glut sets x10     Quad sets x10 X 10 B    SLR x10 AAB     LAQ  X 10 B AA-R X 10 B   Hip flexion  X 10 B AA          AFTER TREATMENT POSITION/PRECAUTIONS:  Chair, Needs within reach, RN notified and Visitors at bedside    INTERDISCIPLINARY COLLABORATION:  RN/PCT and PT/PTA    TOTAL TREATMENT DURATION:  PT Patient Time In/Time Out  Time In: 1005  Time Out: [facility], PTA

## 2022-02-10 NOTE — PROGRESS NOTES
CM attempted to meet with pt but pt indisposed with therapy. CM will f/u therapy recommendations. Pt's primary states pt continues to c/o of chest discomfort. Addendum:  1300 CM received a call from Roger Later of [address]. Pt is accepted to [address] today after covid testing. Pt has requested family to be educated by Chris Argueta with [address] when they arrive. CM ordered rapid covid for placement. [phone] Pt and family met with Donita from [address]. Family toured the facility. Pt is agreeable to discharge to [address]. Covid test pending in lab. DCP: EYAD.

## 2022-02-11 PROBLEM — S72.91XA FEMUR FRACTURE, RIGHT (HCC): Status: ACTIVE | Noted: 2022-02-11

## 2022-02-11 LAB
ALBUMIN SERPL-MCNC: 2.6 G/DL (ref 3.2–4.6)
ALBUMIN/GLOB SERPL: 0.8 {RATIO} (ref 1.2–3.5)
ALP SERPL-CCNC: 56 U/L (ref 50–136)
ALT SERPL-CCNC: 25 U/L (ref 12–65)
ANION GAP SERPL CALC-SCNC: 2 MMOL/L (ref 7–16)
AST SERPL-CCNC: 32 U/L (ref 15–37)
BACTERIA SPEC CULT: NORMAL
BACTERIA SPEC CULT: NORMAL
BILIRUB SERPL-MCNC: 0.6 MG/DL (ref 0.2–1.1)
BUN SERPL-MCNC: 18 MG/DL (ref 8–23)
CALCIUM SERPL-MCNC: 8.7 MG/DL (ref 8.3–10.4)
CHLORIDE SERPL-SCNC: 104 MMOL/L (ref 98–107)
CO2 SERPL-SCNC: 29 MMOL/L (ref 21–32)
CREAT SERPL-MCNC: 0.5 MG/DL (ref 0.6–1)
ERYTHROCYTE [DISTWIDTH] IN BLOOD BY AUTOMATED COUNT: 14.7 % (ref 11.9–14.6)
GLOBULIN SER CALC-MCNC: 3.3 G/DL (ref 2.3–3.5)
GLUCOSE SERPL-MCNC: 97 MG/DL (ref 65–100)
HCT VFR BLD AUTO: 28.1 % (ref 35.8–46.3)
HGB BLD-MCNC: 8.8 G/DL (ref 11.7–15.4)
MCH RBC QN AUTO: 29.2 PG (ref 26.1–32.9)
MCHC RBC AUTO-ENTMCNC: 31.3 G/DL (ref 31.4–35)
MCV RBC AUTO: 93.4 FL (ref 79.6–97.8)
NRBC # BLD: 0 K/UL (ref 0–0.2)
PLATELET # BLD AUTO: 258 K/UL (ref 150–450)
PMV BLD AUTO: 9.9 FL (ref 9.4–12.3)
POTASSIUM SERPL-SCNC: 3.5 MMOL/L (ref 3.5–5.1)
PROT SERPL-MCNC: 5.9 G/DL (ref 6.3–8.2)
RBC # BLD AUTO: 3.01 M/UL (ref 4.05–5.2)
SERVICE CMNT-IMP: NORMAL
SERVICE CMNT-IMP: NORMAL
SODIUM SERPL-SCNC: 135 MMOL/L (ref 136–145)
WBC # BLD AUTO: 9 K/UL (ref 4.3–11.1)

## 2022-02-11 PROCEDURE — 97162 PT EVAL MOD COMPLEX 30 MIN: CPT

## 2022-02-11 PROCEDURE — 85027 COMPLETE CBC AUTOMATED: CPT

## 2022-02-11 PROCEDURE — 74011636637 HC RX REV CODE- 636/637: Performed by: PHYSICAL MEDICINE & REHABILITATION

## 2022-02-11 PROCEDURE — 99232 SBSQ HOSP IP/OBS MODERATE 35: CPT | Performed by: PHYSICAL MEDICINE & REHABILITATION

## 2022-02-11 PROCEDURE — 65310000000 HC RM PRIVATE REHAB

## 2022-02-11 PROCEDURE — 80053 COMPREHEN METABOLIC PANEL: CPT

## 2022-02-11 PROCEDURE — 74011250636 HC RX REV CODE- 250/636: Performed by: PHYSICIAN ASSISTANT

## 2022-02-11 PROCEDURE — 74011000250 HC RX REV CODE- 250: Performed by: PHYSICAL MEDICINE & REHABILITATION

## 2022-02-11 PROCEDURE — 94640 AIRWAY INHALATION TREATMENT: CPT

## 2022-02-11 PROCEDURE — 74011250637 HC RX REV CODE- 250/637: Performed by: PHYSICAL MEDICINE & REHABILITATION

## 2022-02-11 PROCEDURE — 97530 THERAPEUTIC ACTIVITIES: CPT

## 2022-02-11 PROCEDURE — 36415 COLL VENOUS BLD VENIPUNCTURE: CPT

## 2022-02-11 PROCEDURE — 92610 EVALUATE SWALLOWING FUNCTION: CPT

## 2022-02-11 PROCEDURE — 94760 N-INVAS EAR/PLS OXIMETRY 1: CPT

## 2022-02-11 PROCEDURE — 83735 ASSAY OF MAGNESIUM: CPT

## 2022-02-11 PROCEDURE — 97116 GAIT TRAINING THERAPY: CPT

## 2022-02-11 PROCEDURE — 77010033678 HC OXYGEN DAILY

## 2022-02-11 PROCEDURE — 97166 OT EVAL MOD COMPLEX 45 MIN: CPT

## 2022-02-11 PROCEDURE — 97110 THERAPEUTIC EXERCISES: CPT

## 2022-02-11 PROCEDURE — 97535 SELF CARE MNGMENT TRAINING: CPT

## 2022-02-11 PROCEDURE — 51798 US URINE CAPACITY MEASURE: CPT

## 2022-02-11 RX ORDER — NYSTATIN 100000 [USP'U]/ML
500000 SUSPENSION ORAL 4 TIMES DAILY
Status: DISCONTINUED | OUTPATIENT
Start: 2022-02-11 | End: 2022-02-25 | Stop reason: HOSPADM

## 2022-02-11 RX ORDER — TRAMADOL HYDROCHLORIDE 50 MG/1
50 TABLET ORAL
Status: DISCONTINUED | OUTPATIENT
Start: 2022-02-11 | End: 2022-02-25 | Stop reason: HOSPADM

## 2022-02-11 RX ORDER — OXYCODONE HYDROCHLORIDE 5 MG/1
10 TABLET ORAL
Status: DISCONTINUED | OUTPATIENT
Start: 2022-02-11 | End: 2022-02-14

## 2022-02-11 RX ORDER — HEPARIN SODIUM 5000 [USP'U]/ML
5000 INJECTION, SOLUTION INTRAVENOUS; SUBCUTANEOUS EVERY 8 HOURS
Status: DISCONTINUED | OUTPATIENT
Start: 2022-02-11 | End: 2022-02-14

## 2022-02-11 RX ORDER — ALBUTEROL SULFATE 0.83 MG/ML
2.5 SOLUTION RESPIRATORY (INHALATION)
Status: DISCONTINUED | OUTPATIENT
Start: 2022-02-11 | End: 2022-02-25 | Stop reason: HOSPADM

## 2022-02-11 RX ADMIN — PRASUGREL 10 MG: 10 TABLET, FILM COATED ORAL at 09:00

## 2022-02-11 RX ADMIN — METOPROLOL TARTRATE 50 MG: 50 TABLET, FILM COATED ORAL at 18:27

## 2022-02-11 RX ADMIN — LORAZEPAM 0.5 MG: 0.5 TABLET ORAL at 12:33

## 2022-02-11 RX ADMIN — HEPARIN SODIUM 5000 UNITS: 5000 INJECTION INTRAVENOUS; SUBCUTANEOUS at 06:29

## 2022-02-11 RX ADMIN — OXYCODONE 10 MG: 5 TABLET ORAL at 13:27

## 2022-02-11 RX ADMIN — ASPIRIN 81 MG: 81 TABLET ORAL at 10:09

## 2022-02-11 RX ADMIN — ATORVASTATIN CALCIUM 80 MG: 80 TABLET, FILM COATED ORAL at 21:44

## 2022-02-11 RX ADMIN — LEVOFLOXACIN 750 MG: 500 TABLET, FILM COATED ORAL at 17:44

## 2022-02-11 RX ADMIN — LORAZEPAM 0.5 MG: 0.5 TABLET ORAL at 17:58

## 2022-02-11 RX ADMIN — PREDNISONE 40 MG: 20 TABLET ORAL at 12:33

## 2022-02-11 RX ADMIN — METOPROLOL TARTRATE 50 MG: 50 TABLET, FILM COATED ORAL at 00:18

## 2022-02-11 RX ADMIN — HEPARIN SODIUM 5000 UNITS: 5000 INJECTION INTRAVENOUS; SUBCUTANEOUS at 21:44

## 2022-02-11 RX ADMIN — TIOTROPIUM BROMIDE INHALATION SPRAY 2 PUFF: 3.12 SPRAY, METERED RESPIRATORY (INHALATION) at 07:24

## 2022-02-11 RX ADMIN — LORAZEPAM 0.5 MG: 0.5 TABLET ORAL at 21:53

## 2022-02-11 RX ADMIN — PANTOPRAZOLE SODIUM 40 MG: 40 TABLET, DELAYED RELEASE ORAL at 10:09

## 2022-02-11 RX ADMIN — GUAIFENESIN 1200 MG: 600 TABLET ORAL at 10:09

## 2022-02-11 RX ADMIN — METOPROLOL TARTRATE 50 MG: 50 TABLET, FILM COATED ORAL at 06:29

## 2022-02-11 RX ADMIN — ACETAMINOPHEN 650 MG: 325 TABLET ORAL at 21:44

## 2022-02-11 RX ADMIN — ACETAMINOPHEN 650 MG: 325 TABLET ORAL at 13:27

## 2022-02-11 RX ADMIN — ALBUTEROL SULFATE 2.5 MG: 2.5 SOLUTION RESPIRATORY (INHALATION) at 19:46

## 2022-02-11 RX ADMIN — GUAIFENESIN 1200 MG: 600 TABLET ORAL at 21:44

## 2022-02-11 RX ADMIN — ACETAMINOPHEN 650 MG: 325 TABLET ORAL at 06:29

## 2022-02-11 RX ADMIN — NYSTATIN 500000 UNITS: 100000 SUSPENSION ORAL at 17:44

## 2022-02-11 RX ADMIN — TRAMADOL HYDROCHLORIDE 50 MG: 50 TABLET, COATED ORAL at 10:15

## 2022-02-11 RX ADMIN — HEPARIN SODIUM 5000 UNITS: 5000 INJECTION INTRAVENOUS; SUBCUTANEOUS at 13:27

## 2022-02-11 RX ADMIN — SENNOSIDES AND DOCUSATE SODIUM 2 TABLET: 8.6; 5 TABLET ORAL at 10:08

## 2022-02-11 RX ADMIN — METOPROLOL TARTRATE 50 MG: 50 TABLET, FILM COATED ORAL at 12:33

## 2022-02-11 RX ADMIN — Medication 1 AMPULE: at 10:09

## 2022-02-11 RX ADMIN — PANTOPRAZOLE SODIUM 40 MG: 40 TABLET, DELAYED RELEASE ORAL at 17:44

## 2022-02-11 RX ADMIN — METHIMAZOLE 10 MG: 5 TABLET ORAL at 10:08

## 2022-02-11 NOTE — PROGRESS NOTES
STG: Pt will tolerate easy to chew consistencies/thin liquids without overt signs/sx of aspiration with 100% accuracy  STG: Pt will demonstrate understanding and demonstration of safe swallowing strategies Shasha  STG: Pt will participate with trials of regular textures as respiratory status improves and 02 demands decrease  LTG: Pt will tolerate the least restrictive diet at discharge without respiratory compromise      INITIAL BEDSIDE SWALLOWING ASSESSMENT     02/11/22 1154   Time Spent With Patient   Time In 1123   Time Out 1140      02/11/22 1152   Mental Status   Neurologic State Alert   Orientation Level Oriented X4   Cognition Follows commands   Safety/Judgement Awareness of environment;Home safety   Oral Assessment   Labial No impairment   Dentition Intact   Lingual No impairment  (mild thrush)   Velum No impairment   PO Trials   Assessment Method(s) Observation   Vocal Quality No impairment   Consistency Presented Solid; Thin liquid   How Presented Self-fed/presented;Spoon   Bolus Acceptance No impairment   Bolus Formation/Control Impaired   Type of Impairment Delayed;Mastication   Oral Residue None   Initiation of Swallow No impairment   Aspiration Signs/Symptoms None   Pharyngeal Phase Characteristics Easily fatigued ; Poor endurance   Effective Modifications Alternate liquids/solids;  (slow rate)     Patient presents with mild oropharyngeal dysphagia mostly related to decreased breath/swallow coordination with COPD exacerbation. Attests to easily fatiguing with meals and decreased overall endurance. Complains of xerostomia and sensation of stasis with drier foods. No overt signs/sx of aspiration with limited trials assessed during lunch. Patient reported it was early at she had a few things she wanted to do before eating lunch but was agreeable to attempt a few bites/sips for assessment.   Mild thrush noted on oral motor exam.  Patient is very cognizant of need to fully masticate food with increased oral prep with solid trials. Tolerated thin liquids via the straw without overt signs/sx of aspiration. Recommend downgrade diet to easy to chew consistencies/thin liquids. Slow rate. Alternate solids/liquids. Pills one at a time with a liquid wash. Added orders for nutritional supplements; patient was very receptive to trying.     Valeri White MS, CCC-SLP

## 2022-02-11 NOTE — PROGRESS NOTES
PHYSICAL THERAPY EXAMINATION    Patient Name: Cristian Caraballo  Patient Age: 66 y.o. Past Medical History:   Past Medical History:   Diagnosis Date    Arrhythmia     Arthritis     CAD (coronary artery disease) 2009, 8/19/2013    PCI,  Mary Galindo     Carotid stenosis, right     endarterectomy 11/25/19    Chronic anxiety 4/27/2017    COPD     recent [facility] to [facility]    Emphyse[facility] lung ([facility] [address]     GERD (gastroesophageal reflux disease)     controlled with medication    History of echocardiogram 06/07/2019    History of echocardiogram     echo 06/07/19 LVEF 55-60%    Hypertension     Nausea & vomiting     Oxygen dependent     1 liter at night    Pleurisy     Thyroid disease     pt denies       Medical Diagnosis:  Femur fracture, right ([facility] [address] [S72.91XA] <principal problem not specified>    Precautions at Admission: Other (comment) (respiratory and hip)    Therapy Diagnosis:   Difficulty with bed mobility  [x]     Difficulty with functional transfers  [x]     Difficulty with ambulation  [x]     Difficulty with stair negotiations  [x]       Problem List:    Decreased strength B LE  [x]     Decreased strength trunk/core  [x]     Decreased AROM   [x]     Decreased PROM  [x]    Decreased endurance  [x]     Decreased balance sitting  [x]     Decreased balance standing  [x]     Pain   [x]     Slow ambulation velocity  [x]    Decreased coordination  [x]    Decreased safety awareness  [x]      Functional Limitations:   Decreased independence with bed mobility  [x]     Decreased independence with functional transfers  [x]     Decreased independence with ambulation  [x]     Decreased independence with stair negotiation  [x]       Previous Functional Level:  Independent with her mobility without an assistive device    Home Environment: Support Systems: Spouse/Significant Other  Patient Expects to be Discharged to[de-identified] Home with home health (anticipated plan )         Outcome Measures: Vital Signs: Pain level: Patient complained of RLE hip pain and generalized pain to touch and movement. She had pain medication on board. Patient education: Importance of moving her RLE and fall prevention during mobility    Interdisciplinary Communication: Discussed LOS with OT    Cognition: Alert, oriented and able to follow commands. Anxious about movement and her breathing. Engages in conversation appropriately and stays on task during therapy.       MMT Initial Asssessment   Right Lower Extremity Left Lower Extremity   Hip Flexion 3- 4+   Knee Extension 3 4+   Knee Flexion 3 4+   Ankle Dorsiflexion 3+ 4+   0/5 No palpable muscle contraction  1/5 Palpable muscle contraction, no joint movement  2-/5 Less than full range of motion in gravity eliminated position  2/5 Able to complete full range of motion in gravity eliminated position  2+/5 Able to initiate movement against gravity  3-/5 More than half but not full range of motion against gravity  3/5 Able to complete full range of motion against gravity  3+/5 Completes full range of motion against gravity with minimal resistance  4-/5 Completes full range of motion against gravity with minimal-moderate resistance  4/5 Completes full range of motion against gravity with moderate resistance  4+/5 Completes full range of motion against gravity with moderate-maximum resistance  5/5 Completes full range of motion against gravity with maximum resistance     AROM: BLE AROM is WFLs but her RLE is weaker and more painful with movement secondary to her right hip fracture    PRIMARY MODE OF LOCOMOTION: Ambulatory for short distances and w/c for community secondary to her respiratory status    BED/CHAIR/WHEELCHAIR TRANSFERS Initial Assessment   Rolling Right 0 (Not tested)   Rolling Left 0 (Not tested)   Supine to Sit 3 (Moderate assistance)   Sit to Stand Minimal assistance   Sit to Supine 0 (Not tested)   Transfer Type SPT with walker   Comments  additional time and effort needed to complete the task. Verbal cues for pushing up to stand and stepping rather than shuffle/scooting her RLE during transition.    Car Transfer Not tested   Car Type       WHEELCHAIR MOBILITY/MANAGEMENT Initial Assessment   Able to Propel     W/C Assistance     Curbs/ramps assistance required 0 (Not tested)   Wheelchair set up assistance required     Wheelchair management       WALKING INDEPENDENCE Initial Assessment   Assistive device Other (comment) (Parallel bars)   Ambulation assistance - level surface 4 (Minimal assistance)   WBAT on the RLE   Distance 3 Feet (ft)   Comments  Additional time and effort needed to manage 3 steps in the parallel bars with improved RLE step while in the bars   Ambulation assistance - unlevel surface 0 (Not tested)       STEPS/STAIRS Initial Assessment   Steps/Stairs ambulated 0   Stairs Assistance     Rail Use     Comments     Curbs/Ramps         QUALITY INDICATOR ASSIST COMMENTS   Roll right (&return to back) Not Tested: Not attempted due to medical concerns    Roll left (& return to back) Not Tested: Not attempted due to medical concerns    Supine to sit 3: Partial/Moderate A    Sit to stand 3: Partial/Moderate A    Chair/bed-to-chair transfer Not Tested: Not attempted due to medical concerns and safety concerns    Walk 10 feet Not Tested: Not attempted due to medical concerns and safety concerns    Walk 50 feet with 2 turns Not Tested: Not attempted due to medical concerns and safety concerns    Walk 150 feet Not Tested: Not attempted due to medical concerns and safety concerns    Walk 10 feet on uneven  Not Tested: Not attempted due to medical concerns and safety concerns    1 step/curb Not Tested: Not attempted due to medical concerns and safety concerns    4 steps Not Tested: Not attempted due to medical concerns and safety concerns    12 steps Not Tested: Not attempted due to medical concerns and safety concerns     object Not Tested: Not attempted due to medical concerns and safety concerns    Wheel 48' w/2 turns Not Tested: Not attempted due to medical concerns and safety concerns    Wheel 150' Not Tested: Not attempted due to medical concerns and safety concerns    Car Transfer Not Tested: Not attempted due to medical concerns and safety concerns             PHYSICAL THERAPY PLAN OF CARE    Therapy Diagnosis:   Please see table above    Order received from MD for physical therapy services and chart reviewed. Pt to be seen at least 5 times per week for at least 1.5 hours of physical therapy per day for 2 weeks. Thank you for the referral.    LTGs:  Patient will transfer supine to sit with modified independence in 2 weeks  Patient will transfer sit to stand and SPT with RW with supervision in 2 weeks  Patient will ambulate 150' with RW with supervision in 2 weeks  Patient will ambulated up/down 4 steps with bilateral rails with supervision in 2 weeks  Patient will propel her w/c 150' with Reny Bent with supervision in 2 weeks      Pt would benefit from skilled physical therapy in order to improve independent functional mobility within the home. Interventions may include range of motion (AROM, PROM B LE/trunk), motor function (B LE/trunk strengthening/coordination), activity tolerance (vitals, oxygen saturation levels), bed mobility training, balance activities, gait training (progressive ambulation program), and functional transfer training. Please see IRC; Interdisciplinary Eval, Care Plan, and Patient Education for further information regarding physical therapy examination and plan of care.      Mic Saavedra  2/11/2022          ;

## 2022-02-11 NOTE — PROGRESS NOTES
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 She remains sensitive to touch and movement    Other (comment) (respiratory and hip)    COGNITION Daily Assessment    Alert, oriented and able to follow commands. Engages in conversation and was less anxious this afternoon after having pain and anxiety medications. She was kind and cooperative with therapy. TRANSFERS Daily Assessment   Verbal cues and for bending forward and additional time for set up needed. Transfer Type: SPT with walker  Sit to Stand Assistance: Minimal assistance  Car Transfers: Not tested     GAIT Daily Assessment   Managed only 5 steps with rolling walker which was an improvement from her wiggle steps on initial eval.  Low confidence with gait and increased anxiety with gait. Amount of Assistance: 4 (Contact guard assistance)  Distance (ft): 5 Feet (ft)  Assistive Device: Walker, rolling     BALANCE Daily Assessment    Sitting - Static: Good (unsupported)  Sitting - Dynamic: Good (unsupported)  Standing - Static: Fair;Constant support  Standing - Dynamic : Impaired     LOWER EXTREMITY EXERCISES Daily Assessment    Extremity: Both  Exercise Type #1: Seated lower extremity strengthening  Sets Performed: 2  Reps Performed: 10  Level of Assist: Supervision  Exercise Type #2: Seated lower extremity strengthening (motomed x 10 level 0)  Level of Assist: Supervision          Assessment: Patient participated and tolerated therapy better this afternoon. Less anxious with medication on board. She was on 2L of O2 and continues to require frequent rest breaks throughout the session. She tolerated the motomed better than anticipated.  She self limits her mobility out of fear and anxiety. 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 Her call light was left in reach. Plan of Care: Continue to treat and progress as indicated.      Thao Gaitan [place]  2/11/2022

## 2022-02-11 NOTE — PROGRESS NOTES
BSN, CM met with pt at bedside wearing the appropriate PPE with social distance. Pt sitting in w/c paying bills online. Pt alert and oriented to person, place, time, and situation. Pt verified demographic information. PCP verified as Dr. Cade Padgett and pt was last seen by PCP within last 6 months. Pt lives in 1 story home with ex-spouse, 1 steps to enter into the home. Pt reports being independent with ADLs but having problems with bathing r/t SOB. Pt has to take frequent breaks. Pt driving prior to admission. Pt reports having DMEs such as cane and oxygen (portable and concentrator) from [employer]. Pt uses 1L oxygen via NC at home at night but in recent week using oxygen during the day. Pt family able to transport home, to doctor apt,  medications, and get groceries. Pt primary pharmacy is [facility] on [address]. Pt reports being able to afford medications. Pt reports no availability for family to be at home with pt 24 hours. Pt ex-spouse works from home 3-11 pm but has to go to office DT for double shift on Thursdays. Pt reports plans to discharge home. Pt has never used HH or been to SNF. Pt has used outpatient therapy with [facility] in the past. Pt aware of Wednesday Team meetings and would like family updated about meeting. CM to continue to follow and monitor for any needs that may occur. Care Management Interventions  PCP Verified by CM: Yes (Dr. Cade Padgett)  Mode of Transport at Discharge:  Other (see comment) (family )  Transition of Care Consult (CM Consult): Discharge Planning  Discharge Durable Medical Equipment: No  Physical Therapy Consult: Yes  Occupational Therapy Consult: Yes  Speech Therapy Consult: Yes  Support Systems: Spouse/Significant Other  Confirm Follow Up Transport: Family  The Plan for Transition of Care is Related to the Following Treatment Goals : Return to baseline  [employer] Provided?: No  Discharge Location  Patient Expects to be Discharged to[de-identified] Home with home health (anticipated plan )

## 2022-02-11 NOTE — PROGRESS NOTES
[facility] OCCUPATIONAL THERAPY INITIAL EVALUATION    Time In: 0826  Time Out: 1000    Precautions: Falls and Oxygen and Anxiety    Pain: Pt had some pain in L hip that was addressed with repositioning. History of Presenting Illness (per previous reports): The patient is a right-hand dominant, previously functionally independent 66yo WF with PMH including CAD s/p PCI, HTN, O2- and steroid-dependent COPD, GERD, DDD with T12 compression fracture (11/2021), past tobacco use. She presented to the ED 2/3/2022 after mechanical fall with subsequent right hip pain and inability to bear weight. XR noted right femur intertrochanteric fracture. Cardiology cleared patient for surgery. On 2/4, she underwent gamma nail insertion by Theodore Hurley MD ([facility]). She was evaluated by PT / OT on POD #1 and was found to have mobility and self-care deficits. Early POD #2, she experienced respiratory distress, tachycardia and substernal chest discomfort initially improved by NTG and BiPAP. CXR unrevealing, ECG not consistent with acute STEMI. Cardiology opined likely supply-demand mismatch in setting of tachycardia and hypoxemic respiratory failure and recommended TTE and PE work-up (ongoing). 2/6 she had an acute change in her O2 needs requiring high FiO2 on bipap.  She was given 40mg lasix IV and sent for CTA chest due to concerns for PE.  Pt had been weaned to 4L NC, but still having increased work of breathing. Belynda Hum was wheezing and coughing, although not productive.  Denied fevers.  ct showed newer nodules, but this didn't  account for the acute change in her breathing. It did show increased interstitial prominence which could be edema post surgery and bc she had IVF infusing. Pulmonary was consulted. Po Pred was changed to IV solumedrol. They added BNP to labs, pro carlos and lactic acid to r/o infxn as well as spt and blood cxs. She was  Found to have elevated troponins.  Repeat ECHO with EF of 52% but mod to severe mitral regurgitation. EKG then showed ST depression. She was placed on BiPaP and improved. On 2/7 she had severe cp and underwent a Stat cardiac catheterization with PCI to the LAD diagonal and to the distal right coronary artery was performed. The following day, she had no cp and was back on 2L O2. She had further chest pain on 2/9 . troponins were 3869.5 and NT ProBNp 21,522. ntg paste added but not thought to be an acute cardiac event. Today, 2/10, she is on 2L O2. Without cp. He had hyptension with sbp in the 80s after transferring from chair back to be. ACE inhibitor and nitro paste discontinued. Past Medical History/ Co-morbidities:   Past Medical History:   Diagnosis Date    Arrhythmia     Arthritis     CAD (coronary artery disease) 2009, 8/19/2013    PCI,  Jacobson Saadwarner     Carotid stenosis, right     endarterectomy 11/25/19    Chronic anxiety 4/27/2017    COPD     recent referral to [facility] for lung transplant    Emphysema lung ([facility] [address]     GERD (gastroesophageal reflux disease)     controlled with medication    History of echocardiogram 06/07/2019    History of echocardiogram     echo 06/07/19 LVEF 55-60%    Hypertension     Nausea & vomiting     Oxygen dependent     1 liter at night    Pleurisy     Thyroid disease     pt denies       Patient's Goal: \"to be functional\"    Previous Level of Function: Pt was I with self-care including socks and shoes, but was struggling with bathing due to breathing. Pt was driving and not using oxygen during the day. Pt did not use DME to walk. [address] Situation Private residence   Lives Alone No   Support Systems Spouse/Significant Other   Current DME Shower chair,Cane, straight   Stairs to Enter 1     Upper Extremity Function    Not formally tested secondary to time limitations, but no issues noted.        Functional Mobility   Score Comments   Supine to Sit 3: Partial/Moderate A A for RLE   Sit to Supine 3: Partial/Moderate A A for RLE Sit to Stand 4: Supervision or touching A CGA   Transfer Assist 4: Supervision or touching A Transfer Type: SPT   Equipment: N/A   Comments: CGA     Activities of Daily Living    Score Comments   Eating 4: Supervision or touching A Pt reported she was choking when therapist entered   Bathing 4: Supervision or touching A Type of Shower: Bath Pack  Position: Standing PRN and Unsupported Sitting   Adaptive  Equipment: Walker  Comments: Cueing to get all parts   Upper Body  Dressing 5: S/U or clean-up assist Items Applied: Pullover  Position: Unsupported Sitting  Comments: S/U   Lower Body Dressing 2: Substantial/Maximal A Items Applied: Underwear and Elastic pants  Position: Standing PRN and Unsupported Sitting  Adaptive Equipment: RW  Comments: Pt threaded underwear   Donning/[place] Footwear 3: Partial/Moderate A Items Applied: Socks  Adaptive Equipment: N/A  Comments: Pt donned L sock   Toilet Transfer 4: Supervision or touching A Transfer Type: SPT   Equipment: Lorena Fleischer   Comments: [facility] Hygiene 4: Supervision or touching A Output: Urine and BM  Comments: CGA     Cognition: Formal testing needs to be completed. No memory issues noted. Vision/Perception: No deficits noted. Session: Pt was in bed and agreeable to tx. Pt's performance with ADL is reflected in above chart. Session took increased time secondary to pt reporting she was having trouble breathing. O2 saturation was between 92-94% every time when checked, but vitals did not appear to comfort patient. Pt was left with ALINE Goldberg. Interdisciplinary Communication: PT Raghavendra Lyles on LOS     Patient/Family Education: Patient was/were educated On the role of OT, On POC and On IRC expectations.      Problem List: Activity Tolerance, Safety Awareness, Strength, Standing Balance and Cognition    Functional Limitations: ADL, IADL, Functional Transfers and Functional Mobility    Goals:     STG 1: Pt will be set-up with toileting by 2/18/22 to prevent skin breakdown. LTG 1: Pt will be independent with toileting by 2/25/22 to prevent skin breakdown. STG 2: Pt will be moderate A with LB dressing by 2/18/22 to reduce risk of falls. LTG 2: Pt will be set-up with LB dressing by 2/25/22 to reduce risk of falls. STG 3: Pt will be set-up with bathing by 2/18/22 to promote good skin integrity. LTG 3: Pt will be independent with bathing by 2/25/22 to promote good skin integrity. STG 4: Pt will be able to get a snack from the fridge with touching A by 2/18/22 to promote proper nutrition and hydration. LTG 4: Pt will be able to get a snack from the fridge with independent by 2/25/22 to promote proper nutrition and hydration. LTG 5: Pt/caregiver will verbalize  understanding of OT recommendations regarding ADL status, functional transfer status, home safety, DME, AE, energy conservation techniques, safety awareness, activity tolerance, and/or follow-up therapy to increase safety with functional tasks upon discharge. OT order received and chart reviewed. OT orders have been acknowledged. Patient will benefit from skilled OT services to address ADL, functional transfers, UE strength, balance, cognition and activity tolerance to maximize functional performance with daily self-care tasks and functional mobility. Patient will be seen for 1.5-2 hours of skilled OT services 5-6 days a week as appropriate. Initate POC.      Carmela Falcon OT   2/11/2022

## 2022-02-11 NOTE — ROUTINE PROCESS
Called daughter, Luciano Coreas, and informed her that patient is being discharged and moved into room 909. She verbalized understanding.

## 2022-02-11 NOTE — PROGRESS NOTES
Sandy Varela MD  Medical Director  [address] [facility], [address] [facility] [place]  Tel: [phone]       [facility] PROGRESS NOTE    Ram[facility]mez [facility]  Admit Date: 2/10/2022  Admit Diagnosis:   Femur fracture, right ([address] [S72.91XA]    Subjective     Patient seen and examined. Doing fine. \"it is what it is\" . Comfortable breathing right now. Less anxious. Tells me she has felt for awhile now that she is declining in health. No cp, cough.  Slept on and off  Objective:     Current Facility-Administered Medications   Medication Dose Route Frequency    heparin (porcine) injection 5,000 Units  5,000 Units SubCUTAneous Q8H    oxyCODONE IR (ROXICODONE) tablet 10 mg  10 mg Oral Q4H PRN    0.9% sodium chloride infusion 250 mL  250 mL IntraVENous PRN    acetaminophen (TYLENOL) tablet 650 mg  650 mg Oral Q8H    senna-docusate (PERICOLACE) 8.6-50 mg per tablet 2 Tablet  2 Tablet Oral DAILY    sodium chloride (NS) flush 5-40 mL  5-40 mL IntraVENous PRN    alcohol 62% (NOZIN) nasal  1 Ampule  1 Ampule Topical Q12H    albuterol (PROVENTIL HFA, VENTOLIN HFA, PROAIR HFA) inhaler 2 Puff  2 Puff Inhalation Q6H PRN    albuterol (PROVENTIL VENTOLIN) nebulizer solution 2.5 mg  2.5 mg Nebulization Q4H PRN    albuterol-ipratropium (DUO-NEB) 2.5 MG-0.5 MG/3 ML  3 mL Nebulization Q6H PRN    aspirin delayed-release tablet 81 mg  81 mg Oral DAILY    atorvastatin (LIPITOR) tablet 80 mg  80 mg Oral QHS    bisacodyL (DULCOLAX) suppository 10 mg  10 mg Rectal DAILY PRN    guaiFENesin ER (MUCINEX) tablet 1,200 mg  1,200 mg Oral Q12H    levoFLOXacin (LEVAQUIN) tablet 750 mg  750 mg Oral Q24H    lidocaine 4 % patch 1 Patch  1 Patch TransDERmal Q24H    LORazepam (ATIVAN) tablet 0.5 mg  0.5 mg Oral Q4H PRN    methIMAzole (TAPAZOLE) tablet 10 mg  10 mg Oral DAILY    metoprolol tartrate (LOPRESSOR) tablet 50 mg  50 mg Oral Q6H    nitroglycerin (NITROSTAT) tablet 0.4 mg  0.4 mg SubLINGual PRN    ondansetron (ZOFRAN ODT) tablet 4 mg  4 mg Oral Q6H PRN    pantoprazole (PROTONIX) tablet 40 mg  40 mg Oral BID    polyethylene glycol (MIRALAX) packet 17 g  17 g Oral DAILY    prasugreL (EFFIENT) tablet 10 mg  10 mg Oral DAILY    predniSONE (DELTASONE) tablet 40 mg  40 mg Oral DAILY WITH LUNCH    senna-docusate (PERICOLACE) 8.6-50 mg per tablet 1 Tablet  1 Tablet Oral BID PRN    tiotropium bromide (SPIRIVA RESPIMAT) 2.5 mcg /actuation  2 Puff Inhalation DAILY    traZODone (DESYREL) tablet 25 mg  25 mg Oral QHS PRN       Review of Systems:   Denies chest pain, shortness of breath, cough, headache, visual problems, abdominal pain, dysuria, calf pain. Pertinent positives are as noted in the HPI, ROS unremarkable otherwise. Visit Vitals  /68   Pulse 77   Temp 98.2 °F (36.8 °C)   Resp 18   SpO2 96%        Physical Exam:   General: Alert and age appropriately oriented. No acute cardiorespiratory distress. HEENT: Normocephalic, no scleral icterus. Oral mucosa moist without cyanosis. Lungs: Clear to auscultation bilaterally dec at bases. Shallow breaths  Respiration even and unlabored. No adventitious breath sounds   Heart: Regular rate and rhythm, S1, S2. No murmurs, clicks, rub or gallops. Abdomen: Soft, non-tender, not distended. Bowel sounds normoactive. No organomegaly. Genitourinary: Deferred. Neuromuscular:      No gross focal motor deficits noted. Generalized prox>distal weakness   Skin/extremity: No rashes, no erythema. No calf tenderness B LE. No edema; right hip inc healing well.                                                                                  Joyce Fall Risk Assessment:  Magen Smith Fall Risk  Mobility: Ambulates or transfers with assist devices or assistance (02/10/22 2030)  Mobility Interventions: Patient to call before getting OOB (02/10/22 2030)  Mentation: Alert, oriented x 3 (02/10/22 2030)  Medication: Patient receiving anticonvulsants, sedatives(tranquilizers), psychotropics or hypnotics, hypoglycemics, narcotics, sleep aids, antihypertensives, laxatives, or diuretics (02/10/22 2030)  Medication Interventions: Patient to call before getting OOB (02/10/22 2030)  Elimination: Needs assistance with toileting (02/10/22 2030)  Elimination Interventions: Call light in reach (02/10/22 2030)  Prior Fall History: Before admission in past 12 months _home or previous inpatient care) (02/10/22 2030)  History of Falls Interventions: Door open when patient unattended (02/10/22 2030)  Total Score: 4 (02/10/22 2030)  High Fall Risk: Yes (02/10/22 2030)     Labs/Studies:  Recent Results (from the past 72 hour(s))   RBC, ALLOCATE    Collection Time: 02/08/22  7:45 AM   Result Value Ref Range    HISTORY CHECKED?  Historical check performed    TYPE & SCREEN    Collection Time: 02/08/22 11:36 AM   Result Value Ref Range    Crossmatch Expiration 02/11/2022,2359     ABO/Rh(D) A NEGATIVE     Antibody screen NEG     Unit number [id number]     Blood component type  LR     Unit division 00     Status of unit TRANSFUSED     ANTIGEN/ANTIBODY INFO Jk(A) NEGATIVE,  KIMI NEGATIVE,       Crossmatch result Compatible    FERRITIN    Collection Time: 02/08/22  3:15 PM   Result Value Ref Range    Ferritin 228 8 - 388 NG/ML   TRANSFERRIN SATURATION    Collection Time: 02/08/22  3:15 PM   Result Value Ref Range    Iron 49 35 - 150 ug/dL    TIBC 235 (L) 250 - 450 ug/dL    Transferrin Saturation 21 >20 %   VITAMIN B12    Collection Time: 02/08/22  3:15 PM   Result Value Ref Range    Vitamin B12 663 193 - 986 pg/mL   FOLATE    Collection Time: 02/08/22  3:15 PM   Result Value Ref Range    Folate 16.2 3.1 - 17.5 ng/mL   HGB & HCT    Collection Time: 02/08/22 10:44 PM   Result Value Ref Range    HGB 8.9 (L) 11.7 - 15.4 g/dL    HCT 27.7 (L) 35.8 - 46.3 %   CBC WITH AUTOMATED DIFF    Collection Time: 02/09/22  3:55 AM   Result Value Ref Range    WBC 9.9 4.3 - 11.1 K/uL RBC 3.13 (L) 4.05 - 5.2 M/uL    HGB 9.2 (L) 11.7 - 15.4 g/dL    HCT 28.1 (L) 35.8 - 46.3 %    MCV 89.8 79.6 - 97.8 FL    MCH 29.4 26.1 - 32.9 PG    MCHC 32.7 31.4 - 35.0 g/dL    RDW 15.0 (H) 11.9 - 14.6 %    PLATELET 403 214 - 310 K/uL    MPV 10.1 9.4 - 12.3 FL    ABSOLUTE NRBC 0.04 0.0 - 0.2 K/uL    DF AUTOMATED      NEUTROPHILS 89 (H) 43 - 78 %    LYMPHOCYTES 5 (L) 13 - 44 %    MONOCYTES 5 4.0 - 12.0 %    EOSINOPHILS 0 (L) 0.5 - 7.8 %    BASOPHILS 0 0.0 - 2.0 %    IMMATURE GRANULOCYTES 1 0.0 - 5.0 %    ABS. NEUTROPHILS 8.9 (H) 1.7 - 8.2 K/UL    ABS. LYMPHOCYTES 0.5 0.5 - 4.6 K/UL    ABS. MONOCYTES 0.5 0.1 - 1.3 K/UL    ABS. EOSINOPHILS 0.0 0.0 - 0.8 K/UL    ABS. BASOPHILS 0.0 0.0 - 0.2 K/UL    ABS. IMM.  GRANS. 0.1 0.0 - 0.5 K/UL   METABOLIC PANEL, BASIC    Collection Time: 02/09/22  3:55 AM   Result Value Ref Range    Sodium 137 136 - 145 mmol/L    Potassium 4.0 3.5 - 5.1 mmol/L    Chloride 103 98 - 107 mmol/L    CO2 28 21 - 32 mmol/L    Anion gap 6 (L) 7 - 16 mmol/L    Glucose 182 (H) 65 - 100 mg/dL    BUN 19 8 - 23 MG/DL    Creatinine 0.80 0.6 - 1.0 MG/DL    GFR est AA >60 >60 ml/min/1.73m2    GFR est non-AA >60 >60 ml/min/1.73m2    Calcium 9.4 8.3 - 10.4 MG/DL   EKG, 12 LEAD, INITIAL    Collection Time: 02/09/22  4:45 AM   Result Value Ref Range    Ventricular Rate 97 BPM    Atrial Rate 97 BPM    P-R Interval 150 ms    QRS Duration 78 ms    Q-T Interval 348 ms    QTC Calculation (Bezet) 441 ms    Calculated P Axis 86 degrees    Calculated R Axis 69 degrees    Calculated T Axis 92 degrees    Diagnosis       Sinus rhythm with Premature atrial complexes  Abnormal ECG  When compared with ECG of 09-FEB-2022 04:43,  Fusion complexes are no longer Present  Premature ventricular complexes are no longer Present  Premature atrial complexes are now Present  Confirmed by Mike Lantigua ([id number]) on 2/9/2022 7:05:33 AM     TROPONIN-HIGH SENSITIVITY    Collection Time: 02/09/22 11:11 AM   Result Value Ref Range Troponin-High Sensitivity 3,869.5 (HH) 0 - 14 pg/mL   NT-PRO BNP    Collection Time: 02/09/22 11:11 AM   Result Value Ref Range    NT pro-BNP 21,522 (H) 5 - 125 PG/ML   CBC WITH AUTOMATED DIFF    Collection Time: 02/10/22  3:06 AM   Result Value Ref Range    WBC 8.0 4.3 - 11.1 K/uL    RBC 3.05 (L) 4.05 - 5.2 M/uL    HGB 8.8 (L) 11.7 - 15.4 g/dL    HCT 28.1 (L) 35.8 - 46.3 %    MCV 92.1 79.6 - 97.8 FL    MCH 28.9 26.1 - 32.9 PG    MCHC 31.3 (L) 31.4 - 35.0 g/dL    RDW 14.9 (H) 11.9 - 14.6 %    PLATELET 944 389 - 548 K/uL    MPV 10.1 9.4 - 12.3 FL    ABSOLUTE NRBC 0.07 0.0 - 0.2 K/uL    DF AUTOMATED      NEUTROPHILS 83 (H) 43 - 78 %    LYMPHOCYTES 6 (L) 13 - 44 %    MONOCYTES 9 4.0 - 12.0 %    EOSINOPHILS 0 (L) 0.5 - 7.8 %    BASOPHILS 0 0.0 - 2.0 %    IMMATURE GRANULOCYTES 1 0.0 - 5.0 %    ABS. NEUTROPHILS 6.7 1.7 - 8.2 K/UL    ABS. LYMPHOCYTES 0.5 0.5 - 4.6 K/UL    ABS. MONOCYTES 0.8 0.1 - 1.3 K/UL    ABS. EOSINOPHILS 0.0 0.0 - 0.8 K/UL    ABS. BASOPHILS 0.0 0.0 - 0.2 K/UL    ABS. IMM.  GRANS. 0.1 0.0 - 0.5 K/UL   METABOLIC PANEL, BASIC    Collection Time: 02/10/22  3:06 AM   Result Value Ref Range    Sodium 135 (L) 136 - 145 mmol/L    Potassium 4.3 3.5 - 5.1 mmol/L    Chloride 102 98 - 107 mmol/L    CO2 32 21 - 32 mmol/L    Anion gap 1 (L) 7 - 16 mmol/L    Glucose 121 (H) 65 - 100 mg/dL    BUN 24 (H) 8 - 23 MG/DL    Creatinine 0.80 0.6 - 1.0 MG/DL    GFR est AA >60 >60 ml/min/1.73m2    GFR est non-AA >60 >60 ml/min/1.73m2    Calcium 9.0 8.3 - 10.4 MG/DL   EKG, 12 LEAD, INITIAL    Collection Time: 02/10/22  9:31 AM   Result Value Ref Range    Ventricular Rate 84 BPM    Atrial Rate 84 BPM    P-R Interval 146 ms    QRS Duration 82 ms    Q-T Interval 380 ms    QTC Calculation (Bezet) 449 ms    Calculated P Axis 83 degrees    Calculated R Axis 71 degrees    Calculated T Axis 90 degrees    Diagnosis       Sinus rhythm with Premature atrial complexes  Marked ST abnormality, possible inferior subendocardial injury  Abnormal ECG  When compared with ECG of 09-FEB-2022 04:45,  No significant change was found  Confirmed by Ples Manisha ([id number]) on 2/10/2022 10:52:43 AM     COVID-19 RAPID TEST    Collection Time: 02/10/22  2:09 PM   Result Value Ref Range    Specimen source NASAL      COVID-19 rapid test Not detected NOTD     CBC W/O DIFF    Collection Time: 02/11/22  5:25 AM   Result Value Ref Range    WBC 9.0 4.3 - 11.1 K/uL    RBC 3.01 (L) 4.05 - 5.2 M/uL    HGB 8.8 (L) 11.7 - 15.4 g/dL    HCT 28.1 (L) 35.8 - 46.3 %    MCV 93.4 79.6 - 97.8 FL    MCH 29.2 26.1 - 32.9 PG    MCHC 31.3 (L) 31.4 - 35.0 g/dL    RDW 14.7 (H) 11.9 - 14.6 %    PLATELET 822 612 - 316 K/uL    MPV 9.9 9.4 - 12.3 FL    ABSOLUTE NRBC 0.00 0.0 - 0.2 K/uL   METABOLIC PANEL, COMPREHENSIVE    Collection Time: 02/11/22  5:25 AM   Result Value Ref Range    Sodium 135 (L) 136 - 145 mmol/L    Potassium 3.5 3.5 - 5.1 mmol/L    Chloride 104 98 - 107 mmol/L    CO2 29 21 - 32 mmol/L    Anion gap 2 (L) 7 - 16 mmol/L    Glucose 97 65 - 100 mg/dL    BUN 18 8 - 23 MG/DL    Creatinine 0.50 (L) 0.6 - 1.0 MG/DL    GFR est AA >60 >60 ml/min/1.73m2    GFR est non-AA >60 >60 ml/min/1.73m2    Calcium 8.7 8.3 - 10.4 MG/DL    Bilirubin, total 0.6 0.2 - 1.1 MG/DL    ALT (SGPT) 25 12 - 65 U/L    AST (SGOT) 32 15 - 37 U/L    Alk.  phosphatase 56 50 - 136 U/L    Protein, total 5.9 (L) 6.3 - 8.2 g/dL    Albumin 2.6 (L) 3.2 - 4.6 g/dL    Globulin 3.3 2.3 - 3.5 g/dL    A-G Ratio 0.8 (L) 1.2 - 3.5         Assessment:     Problem List as of 2/11/2022 Date Reviewed: 2/9/2022          Codes Class Noted - Resolved    Chest pain ICD-10-CM: R07.9  ICD-9-CM: 786.50  5/31/2019 - Present        Femur fracture, right ([facility] ICD-10-CM: F74.48HO  ICD-9-CM: 821.00  2/11/2022 - Present        COPD with acute lower respiratory infection ([facility] ICD-10-CM: J44.0  ICD-9-CM: 496, 519.8  2/10/2022 - Present        Moderate to severe mitral regurgitation ICD-10-CM: I34.0  ICD-9-CM: 424.0  2/8/2022 - Present        Iron deficiency anemia due to chronic blood loss (Chronic) ICD-10-CM: D50.0  ICD-9-CM: 280.0  2/8/2022 - Present        Closed right hip fracture ([facility] [address] [address] ICD-10-CM: S72.001A  ICD-9-CM: 820.8  2/4/2022 - Present        Other fracture of right femur, initial encounter for closed fracture [facility]) ICD-10-CM: O38.7Y0A  ICD-9-CM: 821.00  2/4/2022 - Present        DNR (do not resuscitate) ICD-10-CM: Z66  ICD-9-CM: V49.86  11/9/2021 - Present    Overview Signed 11/9/2021 12:25 PM by Vijaya Strickland NP     POST form completed - DNR but full treatment, including intubation. No TRACH. No PEG.               Acute bilateral low back pain without sciatica ICD-10-CM: M54.50  ICD-9-CM: 724.2, 338.19  11/9/2021 - Present        Palliative care patient ICD-10-CM: Z51.5  ICD-9-CM: V66.7  7/14/2021 - Present        Ischemic heart disease, hx pci, cath/pci last 5/2019 ICD-10-CM: I25.9  ICD-9-CM: 414.9  2/24/2021 - Present        H/O carotid endarterectomy ICD-10-CM: Z98.890  ICD-9-CM: V45.89  9/21/2020 - Present        Right ear pain ICD-10-CM: H92.01  ICD-9-CM: 388.70  9/21/2020 - Present        Pulmonary mass ICD-10-CM: R91.8  ICD-9-CM: 786.6  2/11/2020 - Present        Centrilobular emphysema ([facility] [address] ICD-10-CM: J43.2  ICD-9-CM: 492.8  12/20/2019 - Present        Carotid stenosis ICD-10-CM: I65.29  ICD-9-CM: 433.10  12/3/2019 - Present        Carotid stenosis, right ICD-10-CM: I65.21  ICD-9-CM: 433.10  12/3/2019 - Present        Hypertensive urgency ICD-10-CM: I16.0  ICD-9-CM: 401.9  6/8/2019 - Present        Chronic respiratory failure with hypoxia (HCC) (Chronic) ICD-10-CM: J96.11  ICD-9-CM: 518.83, 799.02  6/8/2019 - Present        CAD S/P percutaneous coronary angioplasty ICD-10-CM: I25.10, Z98.61  ICD-9-CM: 414.01, V45.82  5/31/2019 - Present        Ventricular ectopy ICD-10-CM: I49.3  ICD-9-CM: 427.69  5/9/2017 - Present        Chronic anxiety ICD-10-CM: F41.9  ICD-9-CM: 300.00  4/27/2017 - Present        Hyperlipidemia (Chronic) ICD-10-CM: E78.5  ICD-9-CM: 272.4  10/10/2016 - Present        Essential hypertension, benign (Chronic) ICD-10-CM: I10  ICD-9-CM: 401.1  10/10/2016 - Present        Coronary artery disease involving native coronary artery of native heart without angina pectoris ICD-10-CM: I25.10  ICD-9-CM: 414.01  10/10/2016 - Present        Hypoxemia ICD-10-CM: R09.02  ICD-9-CM: 799.02  8/25/2013 - Present    Overview Signed 2/5/2014  9:08 AM by Susan Copeland NP     FILIBERTO 8/2013. Pt had over 2 hours 1/2 hours of desaturations at night. O2 was ordered for her to start at 2L in September, but she stated she was feeling better than when FILIBERTO was done and refused O2. FILIBERTO 12/2013: not performed secondary to patient factors.               Acute respiratory failure with hypoxia (HCC) ICD-10-CM: J96.01  ICD-9-CM: 518.81  8/22/2013 - Present        COPD, very severe (HCC) (Chronic) ICD-10-CM: J44.9  ICD-9-CM: 496  8/20/2013 - Present        Personal history of tobacco use ICD-10-CM: K50.904  ICD-9-CM: V15.82  8/19/2013 - Present        History of MI (myocardial infarction) ICD-10-CM: I25.2  ICD-9-CM: 208  8/19/2013 - Present    Overview Addendum 8/30/2018  9:20 AM by Grzegorz Bustos MD     STEMI 8/19/13:  Angioplasty for in-stent stenosis to LAD             RESOLVED: Chronic obstructive pulmonary disease ([facility] [address] ICD-10-CM: J44.9  ICD-9-CM: 496  10/10/2016 - 8/30/2018        RESOLVED: Acute systolic heart failure ([facility] [address] ICD-10-CM: I50.21  ICD-9-CM: 428.21  8/22/2013 - 8/30/2018        RESOLVED: Pulmonary hemorrhage ICD-10-CM: R04.89  ICD-9-CM: 786.30  8/20/2013 - 6/25/2019        RESOLVED: CAD (coronary artery disease) (Chronic) ICD-10-CM: I25.10  ICD-9-CM: 414.00  8/19/2013 - 8/3/2021        RESOLVED: COPD (chronic obstructive pulmonary disease) (HCC) (Chronic) ICD-10-CM: J44.9  ICD-9-CM: 496  8/19/2013 - 8/20/2013              S/p right IT fx s/p Gamma nail fixation with post op acute hypoxic respiratory failure , CAD s/p cardiac cath and stent  Plan / Recommendations / Medical Decision Making:      Daily physician / PA medical management:     rt hip fracture - PT/OT /wbat     thyroid profile was checked and she was found to have hyperthyroidism.  Started on methimazole.       CAD with acute chest pain; cardiac catheterization on  and she is status post PCI to the LAD diagonal and to the distal right coronary artery.  severe MR; follow up with Cardiology; cont Effient  - remote tele x 72hrs; if no cp and no events, will dc tele     Chronic anemia with ABLA; hgb 8.8< 9.2>8.9>7.1; stable; iron studies wnl.      HLD ;cont zetia/ lipitor 80 mg daily      Severe copd; cont steroids ( 40 mg x 3d, then 20mg daily x 3d, then 10mg x 2d then stop ) and O2 supplementation; cont nebs; cont mucinex  - on 1.5L O2 this a.m. Sats 96% at rest     Hypertension - BP fluctuating, managed medically. Hypotensive earlier today; nitro past and ace inhibitor discontinued.      Pain management - . Will require regular pain assessment and comprehensive pain management.   -tylenol and louis prn     Pneumonia prophylaxis - incentive spirometer every hour while awake. On Levaquin until ; CXR  without acute findings.      DVT risk / DVT prophylaxis - daily physician / PA exam to assess as patient is at increased risk for of thromboembolism. Mobilize as tolerated. Sequential pneumatic compression devices (SCDs) when in bed; thigh-high or knee-high thromboembolic deterrent hose when out of bed. On Effient     GI prophylaxis - resume PPI. At times may need additional antacids, Maalox prn.     Depression - ? Pt with anxiety that is apparent. Prn benzo  - pt is very realistic about her health and decline. She speaks calmly about having her  arrangements made and that is \"is what it is\". Very much at peace.  Encouraged her to participate and allow self to heal in the best way possible     General skin care / wound prevention - monitor right incision and general skin wound status daily per staff and physician / PA. At risk for failure due to impaired mobility  -2/11 POD #7 inc c/d/i     Bladder program / urinary retention / neurogenic bladder - schedule voids q 6-8 hrs. Check post-void residual every shift and as needed; in-and-out catheter if post-void residual is more than 400ml.     Bowel program - at risk for constipation as a side effect of opioids, other medications, impaired mobility, etc. MiraLAX daily for regularity, Marisela-Colace for stool softener. PRN MOM, bisacodyl suppository or tablets for constipation.                   Time spent was 25 minutes with over 1/2 in direct patient care/examination, consultation and coordination of care.      Signed By: Ryan Hassan MD     February 11, 2022

## 2022-02-12 LAB — MAGNESIUM SERPL-MCNC: 2.1 MG/DL (ref 1.6–2.3)

## 2022-02-12 PROCEDURE — 97116 GAIT TRAINING THERAPY: CPT

## 2022-02-12 PROCEDURE — 65310000000 HC RM PRIVATE REHAB

## 2022-02-12 PROCEDURE — 77010033678 HC OXYGEN DAILY

## 2022-02-12 PROCEDURE — 94640 AIRWAY INHALATION TREATMENT: CPT

## 2022-02-12 PROCEDURE — 97530 THERAPEUTIC ACTIVITIES: CPT

## 2022-02-12 PROCEDURE — 97112 NEUROMUSCULAR REEDUCATION: CPT

## 2022-02-12 PROCEDURE — 94760 N-INVAS EAR/PLS OXIMETRY 1: CPT

## 2022-02-12 PROCEDURE — 2709999900 HC NON-CHARGEABLE SUPPLY

## 2022-02-12 PROCEDURE — 74011250637 HC RX REV CODE- 250/637: Performed by: PHYSICAL MEDICINE & REHABILITATION

## 2022-02-12 PROCEDURE — 74011250636 HC RX REV CODE- 250/636: Performed by: PHYSICIAN ASSISTANT

## 2022-02-12 PROCEDURE — 74011636637 HC RX REV CODE- 636/637: Performed by: PHYSICAL MEDICINE & REHABILITATION

## 2022-02-12 PROCEDURE — 99232 SBSQ HOSP IP/OBS MODERATE 35: CPT | Performed by: PHYSICAL MEDICINE & REHABILITATION

## 2022-02-12 PROCEDURE — 74011000250 HC RX REV CODE- 250: Performed by: PHYSICAL MEDICINE & REHABILITATION

## 2022-02-12 PROCEDURE — 97110 THERAPEUTIC EXERCISES: CPT

## 2022-02-12 PROCEDURE — 97535 SELF CARE MNGMENT TRAINING: CPT

## 2022-02-12 RX ADMIN — METOPROLOL TARTRATE 50 MG: 50 TABLET, FILM COATED ORAL at 18:07

## 2022-02-12 RX ADMIN — PRASUGREL 10 MG: 10 TABLET, FILM COATED ORAL at 09:00

## 2022-02-12 RX ADMIN — SENNOSIDES AND DOCUSATE SODIUM 2 TABLET: 8.6; 5 TABLET ORAL at 09:02

## 2022-02-12 RX ADMIN — LEVOFLOXACIN 750 MG: 500 TABLET, FILM COATED ORAL at 18:07

## 2022-02-12 RX ADMIN — Medication 1 AMPULE: at 21:00

## 2022-02-12 RX ADMIN — HEPARIN SODIUM 5000 UNITS: 5000 INJECTION INTRAVENOUS; SUBCUTANEOUS at 05:50

## 2022-02-12 RX ADMIN — TRAMADOL HYDROCHLORIDE 50 MG: 50 TABLET, COATED ORAL at 20:55

## 2022-02-12 RX ADMIN — LORAZEPAM 0.5 MG: 0.5 TABLET ORAL at 21:34

## 2022-02-12 RX ADMIN — ONDANSETRON 4 MG: 4 TABLET, ORALLY DISINTEGRATING ORAL at 10:22

## 2022-02-12 RX ADMIN — GUAIFENESIN 1200 MG: 600 TABLET ORAL at 20:57

## 2022-02-12 RX ADMIN — HEPARIN SODIUM 5000 UNITS: 5000 INJECTION INTRAVENOUS; SUBCUTANEOUS at 21:36

## 2022-02-12 RX ADMIN — NITROGLYCERIN 0.4 MG: 0.4 TABLET SUBLINGUAL at 06:39

## 2022-02-12 RX ADMIN — OXYCODONE 10 MG: 5 TABLET ORAL at 09:00

## 2022-02-12 RX ADMIN — NYSTATIN 500000 UNITS: 100000 SUSPENSION ORAL at 22:00

## 2022-02-12 RX ADMIN — PANTOPRAZOLE SODIUM 40 MG: 40 TABLET, DELAYED RELEASE ORAL at 09:01

## 2022-02-12 RX ADMIN — TIOTROPIUM BROMIDE INHALATION SPRAY 2 PUFF: 3.12 SPRAY, METERED RESPIRATORY (INHALATION) at 07:49

## 2022-02-12 RX ADMIN — METHIMAZOLE 10 MG: 5 TABLET ORAL at 09:01

## 2022-02-12 RX ADMIN — ALBUTEROL SULFATE 2.5 MG: 2.5 SOLUTION RESPIRATORY (INHALATION) at 07:49

## 2022-02-12 RX ADMIN — PANTOPRAZOLE SODIUM 40 MG: 40 TABLET, DELAYED RELEASE ORAL at 18:07

## 2022-02-12 RX ADMIN — PREDNISONE 40 MG: 20 TABLET ORAL at 13:24

## 2022-02-12 RX ADMIN — ASPIRIN 81 MG: 81 TABLET ORAL at 09:02

## 2022-02-12 RX ADMIN — ACETAMINOPHEN 650 MG: 325 TABLET ORAL at 05:48

## 2022-02-12 RX ADMIN — LORAZEPAM 0.5 MG: 0.5 TABLET ORAL at 01:52

## 2022-02-12 RX ADMIN — ACETAMINOPHEN 650 MG: 325 TABLET ORAL at 20:57

## 2022-02-12 RX ADMIN — ACETAMINOPHEN 650 MG: 325 TABLET ORAL at 13:24

## 2022-02-12 RX ADMIN — METOPROLOL TARTRATE 50 MG: 50 TABLET, FILM COATED ORAL at 05:48

## 2022-02-12 RX ADMIN — METOPROLOL TARTRATE 50 MG: 50 TABLET, FILM COATED ORAL at 13:24

## 2022-02-12 RX ADMIN — ALBUTEROL SULFATE 2.5 MG: 2.5 SOLUTION RESPIRATORY (INHALATION) at 18:30

## 2022-02-12 RX ADMIN — NITROGLYCERIN 0.4 MG: 0.4 TABLET SUBLINGUAL at 05:53

## 2022-02-12 RX ADMIN — Medication 1 AMPULE: at 09:03

## 2022-02-12 RX ADMIN — ATORVASTATIN CALCIUM 80 MG: 80 TABLET, FILM COATED ORAL at 20:57

## 2022-02-12 RX ADMIN — HEPARIN SODIUM 5000 UNITS: 5000 INJECTION INTRAVENOUS; SUBCUTANEOUS at 13:23

## 2022-02-12 RX ADMIN — GUAIFENESIN 1200 MG: 600 TABLET ORAL at 09:02

## 2022-02-12 NOTE — PROGRESS NOTES
Dat Echeverria MD  Medical Director  [address] [facility], [address] [facility]  Tel: [phone]       [facility] PROGRESS NOTE    Ani Li [facility]  Admit Date: 2/10/2022  Admit Diagnosis:   Femur fracture, right ([address] [S72.91XA]    Subjective     Patient seen and examined. Complained of CP early this a.m. resolved with 2 sublinqual NTG . Thinks it may have been heartburn. hadnt received her protonix yet. A bit tearful this morning. Feels like she is just a burden on her family. Feels as if she is declining quickly and will die soon. Stated \" when I fell and broke my hip, I wish I would have hit my head and be done with it\". Denies suicidal ideation or plan, \"just really tired of not being able to breath\". Affect flat.  Frail appearing    Objective:     Current Facility-Administered Medications   Medication Dose Route Frequency    heparin (porcine) injection 5,000 Units  5,000 Units SubCUTAneous Q8H    albuterol (PROVENTIL VENTOLIN) nebulizer solution 2.5 mg  2.5 mg Nebulization BID RT    traMADoL (ULTRAM) tablet 50 mg  50 mg Oral Q6H PRN    oxyCODONE IR (ROXICODONE) tablet 10 mg  10 mg Oral Q4H PRN    nystatin (MYCOSTATIN) 100,000 unit/mL oral suspension 500,000 Units  500,000 Units Oral QID    0.9% sodium chloride infusion 250 mL  250 mL IntraVENous PRN    acetaminophen (TYLENOL) tablet 650 mg  650 mg Oral Q8H    senna-docusate (PERICOLACE) 8.6-50 mg per tablet 2 Tablet  2 Tablet Oral DAILY    sodium chloride (NS) flush 5-40 mL  5-40 mL IntraVENous PRN    alcohol 62% (NOZIN) nasal  1 Ampule  1 Ampule Topical Q12H    albuterol (PROVENTIL HFA, VENTOLIN HFA, PROAIR HFA) inhaler 2 Puff  2 Puff Inhalation Q6H PRN    albuterol-ipratropium (DUO-NEB) 2.5 MG-0.5 MG/3 ML  3 mL Nebulization Q6H PRN    aspirin delayed-release tablet 81 mg  81 mg Oral DAILY    atorvastatin (LIPITOR) tablet 80 mg  80 mg Oral QHS    bisacodyL (DULCOLAX) suppository 10 mg  10 mg Rectal DAILY PRN    guaiFENesin ER (MUCINEX) tablet 1,200 mg  1,200 mg Oral Q12H    levoFLOXacin (LEVAQUIN) tablet 750 mg  750 mg Oral Q24H    lidocaine 4 % patch 1 Patch  1 Patch TransDERmal Q24H    LORazepam (ATIVAN) tablet 0.5 mg  0.5 mg Oral Q4H PRN    methIMAzole (TAPAZOLE) tablet 10 mg  10 mg Oral DAILY    metoprolol tartrate (LOPRESSOR) tablet 50 mg  50 mg Oral Q6H    nitroglycerin (NITROSTAT) tablet 0.4 mg  0.4 mg SubLINGual PRN    ondansetron (ZOFRAN ODT) tablet 4 mg  4 mg Oral Q6H PRN    pantoprazole (PROTONIX) tablet 40 mg  40 mg Oral BID    polyethylene glycol (MIRALAX) packet 17 g  17 g Oral DAILY    prasugreL (EFFIENT) tablet 10 mg  10 mg Oral DAILY    predniSONE (DELTASONE) tablet 40 mg  40 mg Oral DAILY WITH LUNCH    senna-docusate (PERICOLACE) 8.6-50 mg per tablet 1 Tablet  1 Tablet Oral BID PRN    tiotropium bromide (SPIRIVA RESPIMAT) 2.5 mcg /actuation  2 Puff Inhalation DAILY    traZODone (DESYREL) tablet 25 mg  25 mg Oral QHS PRN       Review of Systems:   Denies  headache, visual problems, abdominal pain, dysuria, calf pain. Pertinent positives are as noted in the HPI, ROS unremarkable otherwise.   + cough, sob; cp resolved; c/o sore mouth but refuses Magic mw or nystatin bc they taste bad. Visit Vitals  BP (!) 141/76   Pulse 80   Temp 97.6 °F (36.4 °C)   Resp 16   SpO2 98%        Physical Exam:   General: Alert and age appropriately oriented. No acute cardiorespiratory distress. Shallow breathing. Chronically ill appearing. HEENT: Normocephalic, no scleral icterus. Oral mucosa dry, white coated   Lungs: Clear to auscultation bilaterally. Respiration even and unlabored but poor inspiratory effort,prolonged expiration. No adventitious bs   Heart: Regular rate and rhythm, S1, S2. No murmurs, clicks, rub or gallops. Abdomen: Soft, non-tender, not distended. Bowel sounds normoactive. No organomegaly. Genitourinary: Deferred. Neuromuscular:      Generalized , diffuse weakness. Right hip flexion hindered by pain  Sensation intact   Skin/extremity: No rashes, no erythema. No calf tenderness B LE.   No edema                                                                              Functional Assessment:          Balance  Sitting - Static: Good (unsupported) (02/11/22 1700)  Sitting - Dynamic: Good (unsupported) (02/11/22 1700)  Standing - Static: Fair;Constant support (02/11/22 1700)  Standing - Dynamic : Impaired (02/11/22 1700)                     Magen Smith Fall Risk Assessment:  Magen Smith Fall Risk  Mobility: Ambulates or transfers with assist devices or assistance (02/12/22 0758)  Mobility Interventions: Patient to call before getting OOB (02/10/22 2030)  Mentation: Alert, oriented x 3 (02/10/22 2030)  Medication: Patient receiving anticonvulsants, sedatives(tranquilizers), psychotropics or hypnotics, hypoglycemics, narcotics, sleep aids, antihypertensives, laxatives, or diuretics (02/10/22 2030)  Medication Interventions: Patient to call before getting OOB (02/10/22 2030)  Elimination: Needs assistance with toileting (02/10/22 2030)  Elimination Interventions: Call light in reach (02/10/22 2030)  Prior Fall History: Before admission in past 12 months _home or previous inpatient care) (02/10/22 2030)  History of Falls Interventions: Door open when patient unattended (02/10/22 2030)  Total Score: 4 (02/10/22 2030)  High Fall Risk: Yes (02/10/22 2030)     Speech Assessment:  Aspiration Signs/Symptoms: None (02/11/22 1152)      Ambulation:  Gait  Distance (ft): 5 Feet (ft) (02/11/22 1700)  Assistive Device: Walker, rolling (02/11/22 1700)     Labs/Studies:  Recent Results (from the past 72 hour(s))   TROPONIN-HIGH SENSITIVITY    Collection Time: 02/09/22 11:11 AM   Result Value Ref Range    Troponin-High Sensitivity 3,869.5 (HH) 0 - 14 pg/mL   NT-PRO BNP    Collection Time: 02/09/22 11:11 AM   Result Value Ref Range    NT pro-BNP 21,522 (H) 5 - 125 PG/ML   CBC WITH AUTOMATED DIFF    Collection Time: 02/10/22  3:06 AM   Result Value Ref Range    WBC 8.0 4.3 - 11.1 K/uL    RBC 3.05 (L) 4.05 - 5.2 M/uL    HGB 8.8 (L) 11.7 - 15.4 g/dL    HCT 28.1 (L) 35.8 - 46.3 %    MCV 92.1 79.6 - 97.8 FL    MCH 28.9 26.1 - 32.9 PG    MCHC 31.3 (L) 31.4 - 35.0 g/dL    RDW 14.9 (H) 11.9 - 14.6 %    PLATELET 718 925 - 055 K/uL    MPV 10.1 9.4 - 12.3 FL    ABSOLUTE NRBC 0.07 0.0 - 0.2 K/uL    DF AUTOMATED      NEUTROPHILS 83 (H) 43 - 78 %    LYMPHOCYTES 6 (L) 13 - 44 %    MONOCYTES 9 4.0 - 12.0 %    EOSINOPHILS 0 (L) 0.5 - 7.8 %    BASOPHILS 0 0.0 - 2.0 %    IMMATURE GRANULOCYTES 1 0.0 - 5.0 %    ABS. NEUTROPHILS 6.7 1.7 - 8.2 K/UL    ABS. LYMPHOCYTES 0.5 0.5 - 4.6 K/UL    ABS. MONOCYTES 0.8 0.1 - 1.3 K/UL    ABS. EOSINOPHILS 0.0 0.0 - 0.8 K/UL    ABS. BASOPHILS 0.0 0.0 - 0.2 K/UL    ABS. IMM.  GRANS. 0.1 0.0 - 0.5 K/UL   METABOLIC PANEL, BASIC    Collection Time: 02/10/22  3:06 AM   Result Value Ref Range    Sodium 135 (L) 136 - 145 mmol/L    Potassium 4.3 3.5 - 5.1 mmol/L    Chloride 102 98 - 107 mmol/L    CO2 32 21 - 32 mmol/L    Anion gap 1 (L) 7 - 16 mmol/L    Glucose 121 (H) 65 - 100 mg/dL    BUN 24 (H) 8 - 23 MG/DL    Creatinine 0.80 0.6 - 1.0 MG/DL    GFR est AA >60 >60 ml/min/1.73m2    GFR est non-AA >60 >60 ml/min/1.73m2    Calcium 9.0 8.3 - 10.4 MG/DL   EKG, 12 LEAD, INITIAL    Collection Time: 02/10/22  9:31 AM   Result Value Ref Range    Ventricular Rate 84 BPM    Atrial Rate 84 BPM    P-R Interval 146 ms    QRS Duration 82 ms    Q-T Interval 380 ms    QTC Calculation (Bezet) 449 ms    Calculated P Axis 83 degrees    Calculated R Axis 71 degrees    Calculated T Axis 90 degrees    Diagnosis       Sinus rhythm with Premature atrial complexes  Marked ST abnormality, possible inferior subendocardial injury  Abnormal ECG  When compared with ECG of 09-FEB-2022 04:45,  No significant change was found  Confirmed by Fritz Ayoub ([id number]) on 2/10/2022 10:52:43 AM COVID-19 RAPID TEST    Collection Time: 02/10/22  2:09 PM   Result Value Ref Range    Specimen source NASAL      COVID-19 rapid test Not detected NOTD     CBC W/O DIFF    Collection Time: 02/11/22  5:25 AM   Result Value Ref Range    WBC 9.0 4.3 - 11.1 K/uL    RBC 3.01 (L) 4.05 - 5.2 M/uL    HGB 8.8 (L) 11.7 - 15.4 g/dL    HCT 28.1 (L) 35.8 - 46.3 %    MCV 93.4 79.6 - 97.8 FL    MCH 29.2 26.1 - 32.9 PG    MCHC 31.3 (L) 31.4 - 35.0 g/dL    RDW 14.7 (H) 11.9 - 14.6 %    PLATELET 899 475 - 802 K/uL    MPV 9.9 9.4 - 12.3 FL    ABSOLUTE NRBC 0.00 0.0 - 0.2 K/uL   METABOLIC PANEL, COMPREHENSIVE    Collection Time: 02/11/22  5:25 AM   Result Value Ref Range    Sodium 135 (L) 136 - 145 mmol/L    Potassium 3.5 3.5 - 5.1 mmol/L    Chloride 104 98 - 107 mmol/L    CO2 29 21 - 32 mmol/L    Anion gap 2 (L) 7 - 16 mmol/L    Glucose 97 65 - 100 mg/dL    BUN 18 8 - 23 MG/DL    Creatinine 0.50 (L) 0.6 - 1.0 MG/DL    GFR est AA >60 >60 ml/min/1.73m2    GFR est non-AA >60 >60 ml/min/1.73m2    Calcium 8.7 8.3 - 10.4 MG/DL    Bilirubin, total 0.6 0.2 - 1.1 MG/DL    ALT (SGPT) 25 12 - 65 U/L    AST (SGOT) 32 15 - 37 U/L    Alk.  phosphatase 56 50 - 136 U/L    Protein, total 5.9 (L) 6.3 - 8.2 g/dL    Albumin 2.6 (L) 3.2 - 4.6 g/dL    Globulin 3.3 2.3 - 3.5 g/dL    A-G Ratio 0.8 (L) 1.2 - 3.5     MAGNESIUM    Collection Time: 02/11/22  5:25 AM   Result Value Ref Range    Magnesium 2.1 1.6 - 2.3 mg/dL       Assessment:     Problem List as of 2/12/2022 Date Reviewed: 2/9/2022          Codes Class Noted - Resolved    Chest pain ICD-10-CM: R07.9  ICD-9-CM: 786.50  5/31/2019 - Present        Femur fracture, right ([facility] ICD-10-CM: F14.00JS  ICD-9-CM: 821.00  2/11/2022 - Present        COPD with acute lower respiratory infection ([facility] ICD-10-CM: J44.0  ICD-9-CM: 496, 519.8  2/10/2022 - Present        Moderate to severe mitral regurgitation ICD-10-CM: I34.0  ICD-9-CM: 424.0  2/8/2022 - Present        Iron deficiency anemia due to chronic blood loss (Chronic) ICD-10-CM: D50.0  ICD-9-CM: 280.0  2/8/2022 - Present        Closed right hip fracture ([facility] ICD-10-CM: S72.001A  ICD-9-CM: 820.8  2/4/2022 - Present        Other fracture of right femur, initial encounter for closed fracture [facility]) ICD-10-CM: H46.7T7Z  ICD-9-CM: 821.00  2/4/2022 - Present        DNR (do not resuscitate) ICD-10-CM: Z66  ICD-9-CM: V49.86  11/9/2021 - Present    Overview Signed 11/9/2021 12:25 PM by Bhavya Salazar NP     POST form completed - DNR but full treatment, including intubation. No TRACH. No PEG.               Acute bilateral low back pain without sciatica ICD-10-CM: M54.50  ICD-9-CM: 724.2, 338.19  11/9/2021 - Present        Palliative care patient ICD-10-CM: Z51.5  ICD-9-CM: V66.7  7/14/2021 - Present        Ischemic heart disease, hx pci, cath/pci last 5/2019 ICD-10-CM: I25.9  ICD-9-CM: 414.9  2/24/2021 - Present        H/O carotid endarterectomy ICD-10-CM: Z98.890  ICD-9-CM: V45.89  9/21/2020 - Present        Right ear pain ICD-10-CM: H92.01  ICD-9-CM: 388.70  9/21/2020 - Present        Pulmonary mass ICD-10-CM: R91.8  ICD-9-CM: 786.6  2/11/2020 - Present        Centrilobular emphysema ([facility] ICD-10-CM: J43.2  ICD-9-CM: 492.8  12/20/2019 - Present        Carotid stenosis ICD-10-CM: I65.29  ICD-9-CM: 433.10  12/3/2019 - Present        Carotid stenosis, right ICD-10-CM: I65.21  ICD-9-CM: 433.10  12/3/2019 - Present        Hypertensive urgency ICD-10-CM: I16.0  ICD-9-CM: 401.9  6/8/2019 - Present        Chronic respiratory failure with hypoxia (HCC) (Chronic) ICD-10-CM: J96.11  ICD-9-CM: 518.83, 799.02  6/8/2019 - Present        CAD S/P percutaneous coronary angioplasty ICD-10-CM: I25.10, Z98.61  ICD-9-CM: 414.01, V45.82  5/31/2019 - Present        Ventricular ectopy ICD-10-CM: I49.3  ICD-9-CM: 427.69  5/9/2017 - Present        Chronic anxiety ICD-10-CM: F41.9  ICD-9-CM: 300.00  4/27/2017 - Present        Hyperlipidemia (Chronic) ICD-10-CM: E78.5  ICD-9-CM: 272.4  10/10/2016 - Present        Essential hypertension, benign (Chronic) ICD-10-CM: I10  ICD-9-CM: 401.1  10/10/2016 - Present        Coronary artery disease involving native coronary artery of native heart without angina pectoris ICD-10-CM: I25.10  ICD-9-CM: 414.01  10/10/2016 - Present        Hypoxemia ICD-10-CM: R09.02  ICD-9-CM: 799.02  8/25/2013 - Present    Overview Signed 2/5/2014  9:08 AM by Akua Ghosh NP     FILIBERTO 8/2013. Pt had over 2 hours 1/2 hours of desaturations at night. O2 was ordered for her to start at 2L in September, but she stated she was feeling better than when FILIBERTO was done and refused O2. FILIBERTO 12/2013: not performed secondary to patient factors.               Acute respiratory failure with hypoxia (HCC) ICD-10-CM: J96.01  ICD-9-CM: 518.81  8/22/2013 - Present        COPD, very severe (HCC) (Chronic) ICD-10-CM: J44.9  ICD-9-CM: 496  8/20/2013 - Present        Personal history of tobacco use ICD-10-CM: B53.953  ICD-9-CM: V15.82  8/19/2013 - Present        History of MI (myocardial infarction) ICD-10-CM: I25.2  ICD-9-CM: 479  8/19/2013 - Present    Overview Addendum 8/30/2018  9:20 AM by James Marerro MD     STEMI 8/19/13:  Angioplasty for in-stent stenosis to LAD             RESOLVED: Chronic obstructive pulmonary disease ([facility] [address] ICD-10-CM: J44.9  ICD-9-CM: 496  10/10/2016 - 8/30/2018        RESOLVED: Acute systolic heart failure ([facility] [address] ICD-10-CM: I50.21  ICD-9-CM: 428.21  8/22/2013 - 8/30/2018        RESOLVED: Pulmonary hemorrhage ICD-10-CM: R04.89  ICD-9-CM: 786.30  8/20/2013 - 6/25/2019        RESOLVED: CAD (coronary artery disease) (Chronic) ICD-10-CM: I25.10  ICD-9-CM: 414.00  8/19/2013 - 8/3/2021        RESOLVED: COPD (chronic obstructive pulmonary disease) (HCC) (Chronic) ICD-10-CM: J44.9  ICD-9-CM: 496  8/19/2013 - 8/20/2013                S/p right IT fx s/p Gamma nail fixation with post op acute hypoxic respiratory failure , CAD s/p cardiac cath and stent  Plan / Recommendations / Medical Decision Making:      Daily physician / PA medical management:     rt hip fracture - PT/OT /wbat; SOB is barrier to progress. Poor endurance     thyroid profile was checked and she was found to have hyperthyroidism.  Started on methimazole.       CAD with acute chest pain; cardiac catheterization on  and she is status post PCI to the LAD diagonal and to the distal right coronary artery.  severe MR; follow up with Cardiology; cont Effient  - remote tele x 72hrs; if no cp and no events, will dc tele  -212 cp this a.m, monitor did not  a change. ? Indigestion. ? ranexa     Chronic anemia with ABLA; hgb 8.8< 9.2>8.9>7.1; stable; iron studies wnl.      HLD ;cont zetia/ lipitor 80 mg daily      Severe copd; cont steroids ( 40 mg x 3d, then 20mg daily x 3d, then 10mg x 2d then stop ) and O2 supplementation; cont nebs; cont mucinex  - on 1.5L O2 this a.m. Sats 96% at rest;  stable     Hypertension - BP fluctuating, managed medically. Hypotensive earlier today; nitro past and ace inhibitor discontinued.    /76 - 151/75 consider low dose ACE inhib     Pain management - . Will require regular pain assessment and comprehensive pain management.   -tylenol and louis prn     Pneumonia prophylaxis - incentive spirometer every hour while awake. On Levaquin until ; CXR  without acute findings.      DVT risk / DVT prophylaxis - daily physician / PA exam to assess as patient is at increased risk for of thromboembolism. Mobilize as tolerated. Sequential pneumatic compression devices (SCDs) when in bed; thigh-high or knee-high thromboembolic deterrent hose when out of bed. On Effient     GI prophylaxis - resume PPI. At times may need additional antacids, Maalox prn.     Depression - ? Pt with anxiety that is apparent. Prn benzo  - pt is very realistic about her health and decline. She speaks calmly about having her  arrangements made and that is \"is what it is\". Very much at peace. Encouraged her to participate and allow self to heal in the best way possible     General skin care / wound prevention - monitor right incision and general skin wound status daily per staff and physician / PA. At risk for failure due to impaired mobility  -2/11 POD #7 inc c/d/i     Bladder program / urinary retention / neurogenic bladder - schedule voids q 6-8 hrs. Check post-void residual every shift and as needed; in-and-out catheter if post-void residual is more than 400ml.     Bowel program - at risk for constipation as a side effect of opioids, other medications, impaired mobility, etc. MiraLAX daily for regularity, Marisela-Colace for stool softener. PRN MOM, bisacodyl suppository or tablets for constipation.             Time spent was 25 minutes with over 1/2 in direct patient care/examination, consultation and coordination of care.      Signed By: Sandy Varela MD     February 12, 2022

## 2022-02-12 NOTE — PROGRESS NOTES
PHYSICAL THERAPY DAILY NOTE  Time In: 0845  Time Out: 0940  Patient Seen For: AM;Balance activities;Gait training;Patient education; Therapeutic exercise;Transfer training; Wheelchair mobility    Subjective: Patient agreeable to physical therapy treatment today. Has C/O pain to the generalized body area. Pain rating 6/10 before treatment and 6/10 with activities. Medication taken about 1 hour ago per RN. Objective:  Patient up sitting in wc. SPT from  to [facility] with RW and CGA/Mark. Orientation Assessment :   Alert and age appropriately oriented. Affect/Behavior:   Appropriate and Cooperative. Basic Command Following:   intact. Safety/Judgment:   Intact. Pain Present:   Yes. O2 98%    Other (comment) (respiratory and hip)  GROSS ASSESSMENT Daily Assessment   BLEs generally decreased functionally         BED/MAT MOBILITY Daily Assessment   Verbal cues and additional time needed. Supine to Sit : 3 (Moderate assistance)  Sit to Supine : 0 (Not tested)       TRANSFERS Daily Assessment   Verbal cues and additional time needed. Transfer Type: SPT with walker  Transfer Assistance : 4 (Minimal assistance)  Sit to Stand Assistance: Minimal assistance  Car Transfers: Not tested       GAIT Daily Assessment   Base of Support: Narrowed; Center of Gravity altered. Speed/Deepika: Slow;Shuffled;Fluctuations; delayed. Step Length: Right shortened;Left shortened  Gait Abnormalities: Trunk sway increased; Decreased step clearance; Path deviations; Step to gait. Distance (ft): 10 Feet (ft). Assistive Device: Gait belt;Walker, rolling (WBAT). Ambulation - Level of assistance: CGA/Mark. Interventions: Safety awareness training; Tactile cues; Verbal cues Amount of Assistance: 4 (Contact guard assistance) (Mark RW control)  Distance (ft): 10 Feet (ft)  Assistive Device: Walker, rolling       COGNITION Daily Assessment   Communication: extra time needed to understand requests.   Social Interaction: patient presents appropriate, cooperating and participates in treatment. Problem Solving: Verbal cues to teach techniques for completing tasks. Memory: Executing requests without distractions. Communication:  Social Interaction:  Problem Solving:  Memory:     STEPS or STAIRS Daily Assessment   NT Steps/Stairs Ambulated (#): 0       BALANCE Daily Assessment   Standing balance activities of various stance positions, eyes open/closed, 15 seconds each with CGA. Sitting - Static: Good (unsupported)  Sitting - Dynamic: Good (unsupported)  Standing - Static: Fair;Poor  Standing - Dynamic : Impaired       WHEELCHAIR MOBILITY Daily Assessment    Curbs/Ramps Assist Required (FIM Score): 0 (Not tested)  Wheelchair Setup Assist Required : 3 (Moderate assistance)  Wheelchair Management: Manages left brake;Manages right brake       LOWER EXTREMITY EXERCISES Daily Assessment    Extremity: Both  Exercise Type #1: Seated lower extremity strengthening  Sets Performed: 1  Reps Performed: 10  Level of Assist: Supervision  Exercise Type #2: Other (comment) ([employer])  Sets Performed:  (level 0)  Reps Performed:  (10 minutes)  Level of Assist: Supervision          Assessment: Education:  Teaching Method:   Demonstration, Explanation. PT Impairments or Limitations:   Ambulation deficits continue with a 5 ft distance increase, Balance deficits mainly dynamically, Endurance deficits, Pain limiting function, Strength and coordination deficits, Transfer deficits. Rehab Potential Physical Therapy:   Good. Grossly no focal motor deficits noted. No rashes, no erythema. No calf tenderness BLE. Patient performed all given exercises listed. Patient was taken back to room. Left in sitting position in wc, call bell and necessities in reach. Nurse notified of completion of treatment. Plan of Care:  The patient has shown the ability to tolerate and benefit from physical therapy daily in a comprehensive inpatient rehabilitation program.  Continue intensive Physical Therapy  to address bed mobility, transfers, ambulation, strengthening, balance, and endurance. Continue with plan of care and focus on goals.      Ethan Maravilla, PTA  2/12/2022

## 2022-02-12 NOTE — PROGRESS NOTES
02/12/22 1400   Time Spent With Patient   Time In 1038   Time Out 1157   Patient Seen For: AM;ADLs     Time In 1038   Time Out 1157     Mobility   Score Comments   Sit to Stand 4: Supervision or touching A Min A    Transfer Assist 4: Supervision or touching A Transfer Type: LPT  Equipment: Grab Bars   Comments: min A for hand placement      Activities of Daily Living    Score Comments   Oral Hyigene 5: S/U or clean-up assist Set up      Bathing 3: Partial/Moderate A Type of Shower: Bath Pack  Position: Supported sitting and Standing PRN   Adaptive  Equipment: N/A  Comments: washed at sink. Initially much prompting need. LB completed with mod A. Mod A for sequencing of activity   Upper Body  Dressing 4: Supervision or touching A Items Applied: Pullover  Position: Supported Sitting  Comments: assist for pull down and assist over arm with IV    Lower Body Dressing 3: Partial/Moderate A Items Applied: Underwear and Elastic pants  Position: Supported sitting and Standing PRN  Adaptive Equipment: N/A  Comments: Assist with threading. Patient able to pull up with assistance over buttocks    Donning/[place] Footwear 1: Dependent Items Applied: Socks  Adaptive Equipment: N/A  Comments: dependent due to time and patient being extremely fatigued   Education  Placed many ADL on shelf so patient can initiate completing activities      Patient need much coaxing to wash up but did agree to take a sink bath and stated she felt much better. Did not want to wash hair with shampoo hat. Did comb. Patient needed several breaks for rest.   Plan:  Continue OT POC with focus on ADL/IADL skills, functional transfers, functional mobility, coordination, strength, static and dynamic balance, and activity tolerance to maximize independence with ADLs and functional transfers.      CARLOS Paris  2/12/2022

## 2022-02-12 NOTE — PROGRESS NOTES
Pt complained of difficulty breathing o2 stats at 96% pt used inhaler and seemed to breathing easier.   HOB up and call light at hand daughter at bedside

## 2022-02-12 NOTE — PROGRESS NOTES
Pt complained of chest pain and requested nitro. one 0.4 mg tablet given sublingual.  See flow sheet for vital signs. Call light at hand.

## 2022-02-13 PROCEDURE — 94660 CPAP INITIATION&MGMT: CPT

## 2022-02-13 PROCEDURE — 77010033678 HC OXYGEN DAILY

## 2022-02-13 PROCEDURE — 94640 AIRWAY INHALATION TREATMENT: CPT

## 2022-02-13 PROCEDURE — 74011636637 HC RX REV CODE- 636/637: Performed by: PHYSICAL MEDICINE & REHABILITATION

## 2022-02-13 PROCEDURE — 74011250637 HC RX REV CODE- 250/637: Performed by: PHYSICAL MEDICINE & REHABILITATION

## 2022-02-13 PROCEDURE — 74011250636 HC RX REV CODE- 250/636: Performed by: PHYSICIAN ASSISTANT

## 2022-02-13 PROCEDURE — 74011000250 HC RX REV CODE- 250: Performed by: PHYSICAL MEDICINE & REHABILITATION

## 2022-02-13 PROCEDURE — 65310000000 HC RM PRIVATE REHAB

## 2022-02-13 PROCEDURE — 94760 N-INVAS EAR/PLS OXIMETRY 1: CPT

## 2022-02-13 RX ADMIN — NITROGLYCERIN 0.4 MG: 0.4 TABLET SUBLINGUAL at 06:21

## 2022-02-13 RX ADMIN — LEVOFLOXACIN 750 MG: 500 TABLET, FILM COATED ORAL at 17:50

## 2022-02-13 RX ADMIN — ALBUTEROL SULFATE 2.5 MG: 2.5 SOLUTION RESPIRATORY (INHALATION) at 19:24

## 2022-02-13 RX ADMIN — Medication 1 AMPULE: at 09:03

## 2022-02-13 RX ADMIN — ACETAMINOPHEN 650 MG: 325 TABLET ORAL at 05:54

## 2022-02-13 RX ADMIN — PREDNISONE 40 MG: 20 TABLET ORAL at 14:19

## 2022-02-13 RX ADMIN — ATORVASTATIN CALCIUM 80 MG: 80 TABLET, FILM COATED ORAL at 21:52

## 2022-02-13 RX ADMIN — METOPROLOL TARTRATE 50 MG: 50 TABLET, FILM COATED ORAL at 00:04

## 2022-02-13 RX ADMIN — NYSTATIN 500000 UNITS: 100000 SUSPENSION ORAL at 21:52

## 2022-02-13 RX ADMIN — LORAZEPAM 0.5 MG: 0.5 TABLET ORAL at 21:52

## 2022-02-13 RX ADMIN — METOPROLOL TARTRATE 50 MG: 50 TABLET, FILM COATED ORAL at 14:18

## 2022-02-13 RX ADMIN — GUAIFENESIN 1200 MG: 600 TABLET ORAL at 21:49

## 2022-02-13 RX ADMIN — PANTOPRAZOLE SODIUM 40 MG: 40 TABLET, DELAYED RELEASE ORAL at 09:04

## 2022-02-13 RX ADMIN — NYSTATIN 500000 UNITS: 100000 SUSPENSION ORAL at 09:14

## 2022-02-13 RX ADMIN — ACETAMINOPHEN 650 MG: 325 TABLET ORAL at 21:52

## 2022-02-13 RX ADMIN — METOPROLOL TARTRATE 50 MG: 50 TABLET, FILM COATED ORAL at 06:00

## 2022-02-13 RX ADMIN — NYSTATIN 500000 UNITS: 100000 SUSPENSION ORAL at 17:50

## 2022-02-13 RX ADMIN — ASPIRIN 81 MG: 81 TABLET ORAL at 09:04

## 2022-02-13 RX ADMIN — GUAIFENESIN 1200 MG: 600 TABLET ORAL at 09:03

## 2022-02-13 RX ADMIN — HEPARIN SODIUM 5000 UNITS: 5000 INJECTION INTRAVENOUS; SUBCUTANEOUS at 05:54

## 2022-02-13 RX ADMIN — NITROGLYCERIN 0.4 MG: 0.4 TABLET SUBLINGUAL at 06:14

## 2022-02-13 RX ADMIN — METOPROLOL TARTRATE 50 MG: 50 TABLET, FILM COATED ORAL at 17:50

## 2022-02-13 RX ADMIN — HEPARIN SODIUM 5000 UNITS: 5000 INJECTION INTRAVENOUS; SUBCUTANEOUS at 14:18

## 2022-02-13 RX ADMIN — ALBUTEROL SULFATE 2.5 MG: 2.5 SOLUTION RESPIRATORY (INHALATION) at 07:40

## 2022-02-13 RX ADMIN — METHIMAZOLE 10 MG: 5 TABLET ORAL at 09:04

## 2022-02-13 RX ADMIN — TRAMADOL HYDROCHLORIDE 50 MG: 50 TABLET, COATED ORAL at 06:08

## 2022-02-13 RX ADMIN — SENNOSIDES AND DOCUSATE SODIUM 2 TABLET: 8.6; 5 TABLET ORAL at 09:03

## 2022-02-13 RX ADMIN — NYSTATIN 500000 UNITS: 100000 SUSPENSION ORAL at 14:18

## 2022-02-13 RX ADMIN — PANTOPRAZOLE SODIUM 40 MG: 40 TABLET, DELAYED RELEASE ORAL at 17:50

## 2022-02-13 RX ADMIN — Medication 1 AMPULE: at 21:53

## 2022-02-13 RX ADMIN — PRASUGREL 10 MG: 10 TABLET, FILM COATED ORAL at 09:00

## 2022-02-13 RX ADMIN — HEPARIN SODIUM 5000 UNITS: 5000 INJECTION INTRAVENOUS; SUBCUTANEOUS at 21:52

## 2022-02-13 RX ADMIN — ACETAMINOPHEN 650 MG: 325 TABLET ORAL at 14:19

## 2022-02-13 NOTE — PROGRESS NOTES
Problem: Falls - Risk of  Goal: *Absence of Falls  Description: Document Ace Croft Fall Risk and appropriate interventions in the flowsheet.   Outcome: Progressing Towards Goal  Note: Fall Risk Interventions:  Mobility Interventions: Bed/chair exit alarm,Communicate number of staff needed for ambulation/transfer,Patient to call before getting OOB,Strengthening exercises (ROM-active/passive),Utilize walker, cane, or other assistive device         Medication Interventions: Evaluate medications/consider consulting pharmacy,Patient to call before getting OOB,Teach patient to arise slowly    Elimination Interventions: Call light in reach,Patient to call for help with toileting needs,Toilet paper/wipes in reach    History of Falls Interventions: Consult care management for discharge planning,Door open when patient unattended,Evaluate medications/consider consulting pharmacy

## 2022-02-14 LAB
ANION GAP SERPL CALC-SCNC: 5 MMOL/L (ref 7–16)
BUN SERPL-MCNC: 10 MG/DL (ref 8–23)
CALCIUM SERPL-MCNC: 8.8 MG/DL (ref 8.3–10.4)
CHLORIDE SERPL-SCNC: 103 MMOL/L (ref 98–107)
CO2 SERPL-SCNC: 28 MMOL/L (ref 21–32)
CREAT SERPL-MCNC: 0.5 MG/DL (ref 0.6–1)
ERYTHROCYTE [DISTWIDTH] IN BLOOD BY AUTOMATED COUNT: 15.9 % (ref 11.9–14.6)
GLUCOSE SERPL-MCNC: 99 MG/DL (ref 65–100)
HCT VFR BLD AUTO: 28.2 % (ref 35.8–46.3)
HGB BLD-MCNC: 8.9 G/DL (ref 11.7–15.4)
MCH RBC QN AUTO: 29.3 PG (ref 26.1–32.9)
MCHC RBC AUTO-ENTMCNC: 31.6 G/DL (ref 31.4–35)
MCV RBC AUTO: 92.8 FL (ref 79.6–97.8)
NRBC # BLD: 0 K/UL (ref 0–0.2)
PLATELET # BLD AUTO: 317 K/UL (ref 150–450)
PMV BLD AUTO: 9.9 FL (ref 9.4–12.3)
POTASSIUM SERPL-SCNC: 3.8 MMOL/L (ref 3.5–5.1)
RBC # BLD AUTO: 3.04 M/UL (ref 4.05–5.2)
SODIUM SERPL-SCNC: 136 MMOL/L (ref 136–145)
WBC # BLD AUTO: 7.7 K/UL (ref 4.3–11.1)

## 2022-02-14 PROCEDURE — 74011250637 HC RX REV CODE- 250/637: Performed by: PHYSICIAN ASSISTANT

## 2022-02-14 PROCEDURE — 36415 COLL VENOUS BLD VENIPUNCTURE: CPT

## 2022-02-14 PROCEDURE — 74011250636 HC RX REV CODE- 250/636: Performed by: PHYSICIAN ASSISTANT

## 2022-02-14 PROCEDURE — 65310000000 HC RM PRIVATE REHAB

## 2022-02-14 PROCEDURE — 97535 SELF CARE MNGMENT TRAINING: CPT

## 2022-02-14 PROCEDURE — 85027 COMPLETE CBC AUTOMATED: CPT

## 2022-02-14 PROCEDURE — 92523 SPEECH SOUND LANG COMPREHEN: CPT

## 2022-02-14 PROCEDURE — 92526 ORAL FUNCTION THERAPY: CPT

## 2022-02-14 PROCEDURE — 97116 GAIT TRAINING THERAPY: CPT

## 2022-02-14 PROCEDURE — 97110 THERAPEUTIC EXERCISES: CPT

## 2022-02-14 PROCEDURE — 80048 BASIC METABOLIC PNL TOTAL CA: CPT

## 2022-02-14 PROCEDURE — 97530 THERAPEUTIC ACTIVITIES: CPT

## 2022-02-14 PROCEDURE — 94760 N-INVAS EAR/PLS OXIMETRY 1: CPT

## 2022-02-14 PROCEDURE — 74011000250 HC RX REV CODE- 250: Performed by: PHYSICAL MEDICINE & REHABILITATION

## 2022-02-14 PROCEDURE — 99232 SBSQ HOSP IP/OBS MODERATE 35: CPT | Performed by: PHYSICAL MEDICINE & REHABILITATION

## 2022-02-14 PROCEDURE — 74011250637 HC RX REV CODE- 250/637: Performed by: PHYSICAL MEDICINE & REHABILITATION

## 2022-02-14 PROCEDURE — 77010033678 HC OXYGEN DAILY

## 2022-02-14 PROCEDURE — 74011636637 HC RX REV CODE- 636/637: Performed by: PHYSICAL MEDICINE & REHABILITATION

## 2022-02-14 PROCEDURE — 94640 AIRWAY INHALATION TREATMENT: CPT

## 2022-02-14 RX ORDER — OXYCODONE HYDROCHLORIDE 5 MG/1
5-10 TABLET ORAL
Status: DISCONTINUED | OUTPATIENT
Start: 2022-02-14 | End: 2022-02-25 | Stop reason: HOSPADM

## 2022-02-14 RX ADMIN — PREDNISONE 40 MG: 20 TABLET ORAL at 12:32

## 2022-02-14 RX ADMIN — PANTOPRAZOLE SODIUM 40 MG: 40 TABLET, DELAYED RELEASE ORAL at 17:12

## 2022-02-14 RX ADMIN — METOPROLOL TARTRATE 50 MG: 50 TABLET, FILM COATED ORAL at 17:12

## 2022-02-14 RX ADMIN — ALBUTEROL SULFATE 2 PUFF: 90 AEROSOL, METERED RESPIRATORY (INHALATION) at 20:11

## 2022-02-14 RX ADMIN — PRASUGREL 10 MG: 10 TABLET, FILM COATED ORAL at 09:00

## 2022-02-14 RX ADMIN — IPRATROPIUM BROMIDE AND ALBUTEROL SULFATE 3 ML: .5; 3 SOLUTION RESPIRATORY (INHALATION) at 04:44

## 2022-02-14 RX ADMIN — NYSTATIN 500000 UNITS: 100000 SUSPENSION ORAL at 08:55

## 2022-02-14 RX ADMIN — LORAZEPAM 0.5 MG: 0.5 TABLET ORAL at 21:21

## 2022-02-14 RX ADMIN — LORAZEPAM 0.5 MG: 0.5 TABLET ORAL at 09:34

## 2022-02-14 RX ADMIN — NYSTATIN 500000 UNITS: 100000 SUSPENSION ORAL at 12:34

## 2022-02-14 RX ADMIN — METOPROLOL TARTRATE 50 MG: 50 TABLET, FILM COATED ORAL at 12:32

## 2022-02-14 RX ADMIN — ACETAMINOPHEN 650 MG: 325 TABLET ORAL at 05:01

## 2022-02-14 RX ADMIN — ACETAMINOPHEN 650 MG: 325 TABLET ORAL at 14:22

## 2022-02-14 RX ADMIN — GUAIFENESIN 1200 MG: 600 TABLET ORAL at 21:16

## 2022-02-14 RX ADMIN — HEPARIN SODIUM 5000 UNITS: 5000 INJECTION INTRAVENOUS; SUBCUTANEOUS at 05:00

## 2022-02-14 RX ADMIN — SENNOSIDES AND DOCUSATE SODIUM 2 TABLET: 8.6; 5 TABLET ORAL at 08:55

## 2022-02-14 RX ADMIN — ASPIRIN 81 MG: 81 TABLET ORAL at 08:55

## 2022-02-14 RX ADMIN — TRAMADOL HYDROCHLORIDE 50 MG: 50 TABLET, COATED ORAL at 04:51

## 2022-02-14 RX ADMIN — METOPROLOL TARTRATE 50 MG: 50 TABLET, FILM COATED ORAL at 05:01

## 2022-02-14 RX ADMIN — Medication 1 AMPULE: at 21:16

## 2022-02-14 RX ADMIN — Medication 1 AMPULE: at 08:57

## 2022-02-14 RX ADMIN — ALBUTEROL SULFATE 2.5 MG: 2.5 SOLUTION RESPIRATORY (INHALATION) at 09:10

## 2022-02-14 RX ADMIN — METOPROLOL TARTRATE 50 MG: 50 TABLET, FILM COATED ORAL at 01:00

## 2022-02-14 RX ADMIN — TRAMADOL HYDROCHLORIDE 50 MG: 50 TABLET, COATED ORAL at 14:22

## 2022-02-14 RX ADMIN — GUAIFENESIN 1200 MG: 600 TABLET ORAL at 08:55

## 2022-02-14 RX ADMIN — OXYCODONE HYDROCHLORIDE 5 MG: 5 TABLET ORAL at 11:40

## 2022-02-14 RX ADMIN — PANTOPRAZOLE SODIUM 40 MG: 40 TABLET, DELAYED RELEASE ORAL at 08:56

## 2022-02-14 RX ADMIN — ATORVASTATIN CALCIUM 80 MG: 80 TABLET, FILM COATED ORAL at 21:16

## 2022-02-14 RX ADMIN — ACETAMINOPHEN 650 MG: 325 TABLET ORAL at 21:16

## 2022-02-14 RX ADMIN — METHIMAZOLE 10 MG: 5 TABLET ORAL at 08:55

## 2022-02-14 NOTE — PROGRESS NOTES
Time In 0919   Time Out 1047     Mobility   Score Comments   Supine to Sit 4: Supervision or touching A S   Sit to Stand 4: Supervision or touching A CGA   Transfer Assist 4: Supervision or touching A Transfer Type: SPT   Equipment: Rolling Walker   Comments: CGA     Activities of Daily Living    Score Comments   Oral Hygiene 6: Independent I   Bathing 4: Supervision or touching A Type of Shower: Bath Pack  Position: Standing PRN and Unsupported Sitting   Adaptive  Equipment: Walker  Comments: Cueing to get all parts   Upper Body  Dressing 5: S/U or clean-up assist Items Applied: Pullover  Position: Unsupported Sitting  Comments: S/U   Lower Body Dressing 4: Supervision or touching A Items Applied: Underwear and Elastic pants  Position: Standing PRN and Unsupported Sitting  Adaptive Equipment: RW  Comments: Cueing for techniques    [place] Footwear 1: Dependent Items Applied: Socks and Shoes with fasteners   Adaptive Equipment: N/A  Comments: A for all parts   Education  Dressing strategies     Pt was in bed and agreeable to tx. Pt's performance with ADL is reflected in above chart. Pt demonstrated increased speed and decreased anxiety today, but still significant anxiety. Pt needs to work on downward reach to work on socks and shoes. Pt reports trouble with L knee, but unknown reasons. Pt responds well to distraction. Pt worked on downward reach x35 with fair performance. More practice is recommended. Pt completed 5 minutes on the ergometer backwards with moderate resistance to increase UB strength and activity tolerance for integration into ADL. Pt reported pain in R thigh and ice pack was applied. Pt was left in w/c with all needs within reach.        Isael Carpenter OT   2/14/2022

## 2022-02-14 NOTE — PROGRESS NOTES
Nela Horan MD  Medical Director  [address] [facility], [address] [facility]  Tel: [phone]       [facility] PROGRESS NOTE    [facility]  Admit Date: 2/10/2022  Admit Diagnosis:   Femur fracture, right ([address] [S72.91XA]    Subjective     Patient seen and examined before AM therapies. Had CP yesterday AM, none today. Very anxious, which seems to negatively overshadow all of her other health issues. Inquired about any past treatments; patient states she was possibly on Lexapro in distant past but unsure of effects. Discussed team approach to her care in [facility]; will review her med list, observe her in therapy and discuss any plans before making changes. Attempted to validate her concerns and reassure her that she is safe and monitored. AVSS, O2 at 1.5L NC.  Hgb 8.9, low but stable, BMP normal.    Objective:     Current Facility-Administered Medications   Medication Dose Route Frequency    heparin (porcine) injection 5,000 Units  5,000 Units SubCUTAneous Q8H    albuterol (PROVENTIL VENTOLIN) nebulizer solution 2.5 mg  2.5 mg Nebulization BID RT    traMADoL (ULTRAM) tablet 50 mg  50 mg Oral Q6H PRN    oxyCODONE IR (ROXICODONE) tablet 10 mg  10 mg Oral Q4H PRN    nystatin (MYCOSTATIN) 100,000 unit/mL oral suspension 500,000 Units  500,000 Units Oral QID    0.9% sodium chloride infusion 250 mL  250 mL IntraVENous PRN    acetaminophen (TYLENOL) tablet 650 mg  650 mg Oral Q8H    senna-docusate (PERICOLACE) 8.6-50 mg per tablet 2 Tablet  2 Tablet Oral DAILY    sodium chloride (NS) flush 5-40 mL  5-40 mL IntraVENous PRN    alcohol 62% (NOZIN) nasal  1 Ampule  1 Ampule Topical Q12H    albuterol (PROVENTIL HFA, VENTOLIN HFA, PROAIR HFA) inhaler 2 Puff  2 Puff Inhalation Q6H PRN    albuterol-ipratropium (DUO-NEB) 2.5 MG-0.5 MG/3 ML  3 mL Nebulization Q6H PRN    aspirin delayed-release tablet 81 mg  81 mg Oral DAILY    atorvastatin (LIPITOR) tablet 80 mg  80 mg Oral QHS    bisacodyL (DULCOLAX) suppository 10 mg  10 mg Rectal DAILY PRN    guaiFENesin ER (MUCINEX) tablet 1,200 mg  1,200 mg Oral Q12H    lidocaine 4 % patch 1 Patch  1 Patch TransDERmal Q24H    LORazepam (ATIVAN) tablet 0.5 mg  0.5 mg Oral Q4H PRN    methIMAzole (TAPAZOLE) tablet 10 mg  10 mg Oral DAILY    metoprolol tartrate (LOPRESSOR) tablet 50 mg  50 mg Oral Q6H    nitroglycerin (NITROSTAT) tablet 0.4 mg  0.4 mg SubLINGual PRN    ondansetron (ZOFRAN ODT) tablet 4 mg  4 mg Oral Q6H PRN    pantoprazole (PROTONIX) tablet 40 mg  40 mg Oral BID    polyethylene glycol (MIRALAX) packet 17 g  17 g Oral DAILY    prasugreL (EFFIENT) tablet 10 mg  10 mg Oral DAILY    predniSONE (DELTASONE) tablet 40 mg  40 mg Oral DAILY WITH LUNCH    senna-docusate (PERICOLACE) 8.6-50 mg per tablet 1 Tablet  1 Tablet Oral BID PRN    tiotropium bromide (SPIRIVA RESPIMAT) 2.5 mcg /actuation  2 Puff Inhalation DAILY    traZODone (DESYREL) tablet 25 mg  25 mg Oral QHS PRN       Review of Systems:   Admits to baseline dyspnea, worse with anxiety; admits to cough. Denies chest pain (today), headache, visual problems, abdominal pain, dysuria, calf pain. Pertinent positives are as noted in the HPI, ROS unremarkable otherwise. Visit Vitals  /72 (BP 1 Location: Left upper arm, BP Patient Position: Sitting)   Pulse 73   Temp 97.5 °F (36.4 °C)   Resp 18   SpO2 98%        Physical Exam:   General: Dozing but alerts easily, appropriately oriented. Sitting up in bed after partial breakfast.    HEENT: Normocephalic, EOM intact. Oral mucosa moist.   Lungs: Quiet breath sounds to auscultation bilaterally. Respiration even but prolonged expiration. Heart: Regular rate and rhythm, normal S1, S2. No appreciable murmur. Abdomen: Soft, non-tender, not distended. Bowel sounds normoactive, no organomegaly. Genitourinary: Deferred.    Neuromuscular:      UE strength at biceps, triceps and  full and symmetric. LE with proximal weakness, R>>L, right HF limited by direct pain, left HF impaired by pain referred to right side; improved strength distally, L>>R. Sensation intact. Skin/extremity: No rashes, no erythema. Calves soft, non-tender B LE.  Mild pedal edema to distal leg, R>>L. Functional Assessment:  Balance  Sitting - Static: Good (unsupported) (02/12/22 1200)  Sitting - Dynamic: Good (unsupported) (02/12/22 1200)  Standing - Static: Fair;Poor (02/12/22 1200)  Standing - Dynamic : Impaired (02/12/22 1200)       Abbey Khalil Fall Risk Assessment:  Abbey Khalil Fall Risk  Mobility: Ambulates or transfers with assist devices or assistance (02/13/22 1858)  Mobility Interventions: Bed/chair exit alarm; Patient to call before getting OOB;Utilize walker, cane, or other assistive device (02/13/22 1858)  Mentation: Alert, oriented x 3 (02/13/22 1858)  Medication: Patient receiving anticonvulsants, sedatives(tranquilizers), psychotropics or hypnotics, hypoglycemics, narcotics, sleep aids, antihypertensives, laxatives, or diuretics (02/13/22 1858)  Medication Interventions: Patient to call before getting OOB; Teach patient to arise slowly (02/13/22 1858)  Elimination: Needs assistance with toileting (02/13/22 1858)  Elimination Interventions: Call light in reach; Patient to call for help with toileting needs; Toileting schedule/hourly rounds (02/13/22 1858)  Prior Fall History: Before admission in past 12 months _home or previous inpatient care) (02/13/22 1858)  History of Falls Interventions: Consult care management for discharge planning (02/13/22 1858)  Total Score: 4 (02/13/22 1858)  High Fall Risk: Yes (02/13/22 1858)     Speech Assessment:  Aspiration Signs/Symptoms: None (02/11/22 1152)      Ambulation:  Gait  Distance (ft): 10 Feet (ft) (02/12/22 1200)  Assistive Device: Walker, rolling (02/12/22 1200)     Labs/Studies:  Recent Results (from the past 72 hour(s))   CBC W/O DIFF Collection Time: 02/14/22  3:32 AM   Result Value Ref Range    WBC 7.7 4.3 - 11.1 K/uL    RBC 3.04 (L) 4.05 - 5.2 M/uL    HGB 8.9 (L) 11.7 - 15.4 g/dL    HCT 28.2 (L) 35.8 - 46.3 %    MCV 92.8 79.6 - 97.8 FL    MCH 29.3 26.1 - 32.9 PG    MCHC 31.6 31.4 - 35.0 g/dL    RDW 15.9 (H) 11.9 - 14.6 %    PLATELET 813 856 - 967 K/uL    MPV 9.9 9.4 - 12.3 FL    ABSOLUTE NRBC 0.00 0.0 - 0.2 K/uL   METABOLIC PANEL, BASIC    Collection Time: 02/14/22  3:32 AM   Result Value Ref Range    Sodium 136 136 - 145 mmol/L    Potassium 3.8 3.5 - 5.1 mmol/L    Chloride 103 98 - 107 mmol/L    CO2 28 21 - 32 mmol/L    Anion gap 5 (L) 7 - 16 mmol/L    Glucose 99 65 - 100 mg/dL    BUN 10 8 - 23 MG/DL    Creatinine 0.50 (L) 0.6 - 1.0 MG/DL    GFR est AA >60 >60 ml/min/1.73m2    GFR est non-AA >60 >60 ml/min/1.73m2    Calcium 8.8 8.3 - 10.4 MG/DL       Assessment:     Problem List as of 2/14/2022 Date Reviewed: 2/14/2022          Codes Class Noted - Resolved    Chest pain ICD-10-CM: R07.9  ICD-9-CM: 786.50  5/31/2019 - Present        * (Principal) Femur fracture, right ([facility] ICD-10-CM: C50.17FR  ICD-9-CM: 821.00  2/11/2022 - Present        COPD with acute lower respiratory infection ([facility] ICD-10-CM: J44.0  ICD-9-CM: 496, 519.8  2/10/2022 - Present        Moderate to severe mitral regurgitation ICD-10-CM: I34.0  ICD-9-CM: 424.0  2/8/2022 - Present        Iron deficiency anemia due to chronic blood loss (Chronic) ICD-10-CM: D50.0  ICD-9-CM: 280.0  2/8/2022 - Present        Closed right hip fracture (HCC) ICD-10-CM: S72.001A  ICD-9-CM: 820.8  2/4/2022 - Present        Other fracture of right femur, initial encounter for closed fracture ([facility] ICD-10-CM: C11.9B8I  ICD-9-CM: 821.00  2/4/2022 - Present        DNR (do not resuscitate) ICD-10-CM: Z66  ICD-9-CM: V49.86  11/9/2021 - Present    Overview Signed 11/9/2021 12:25 PM by Shmuel Moser NP     POST form completed - DNR but full treatment, including intubation. No TRACH. No PEG. Acute bilateral low back pain without sciatica ICD-10-CM: M54.50  ICD-9-CM: 724.2, 338.19  11/9/2021 - Present        Palliative care patient ICD-10-CM: Z51.5  ICD-9-CM: V66.7  7/14/2021 - Present        Ischemic heart disease, hx pci, cath/pci last 5/2019 ICD-10-CM: I25.9  ICD-9-CM: 414.9  2/24/2021 - Present        H/O carotid endarterectomy ICD-10-CM: Z98.890  ICD-9-CM: V45.89  9/21/2020 - Present        Right ear pain ICD-10-CM: H92.01  ICD-9-CM: 388.70  9/21/2020 - Present        Pulmonary mass ICD-10-CM: R91.8  ICD-9-CM: 786.6  2/11/2020 - Present        Centrilobular emphysema ([address] ICD-10-CM: J43.2  ICD-9-CM: 492.8  12/20/2019 - Present        Carotid stenosis ICD-10-CM: I65.29  ICD-9-CM: 433.10  12/3/2019 - Present        Carotid stenosis, right ICD-10-CM: I65.21  ICD-9-CM: 433.10  12/3/2019 - Present        Hypertensive urgency ICD-10-CM: I16.0  ICD-9-CM: 401.9  6/8/2019 - Present        Chronic respiratory failure with hypoxia (HCC) (Chronic) ICD-10-CM: J96.11  ICD-9-CM: 518.83, 799.02  6/8/2019 - Present        CAD S/P percutaneous coronary angioplasty ICD-10-CM: I25.10, Z98.61  ICD-9-CM: 414.01, V45.82  5/31/2019 - Present        Ventricular ectopy ICD-10-CM: I49.3  ICD-9-CM: 427.69  5/9/2017 - Present        Chronic anxiety ICD-10-CM: F41.9  ICD-9-CM: 300.00  4/27/2017 - Present        Hyperlipidemia (Chronic) ICD-10-CM: E78.5  ICD-9-CM: 272.4  10/10/2016 - Present        Essential hypertension, benign (Chronic) ICD-10-CM: I10  ICD-9-CM: 401.1  10/10/2016 - Present        Coronary artery disease involving native coronary artery of native heart without angina pectoris ICD-10-CM: I25.10  ICD-9-CM: 414.01  10/10/2016 - Present        Hypoxemia ICD-10-CM: R09.02  ICD-9-CM: 799.02  8/25/2013 - Present    Overview Signed 2/5/2014  9:08 AM by Jaclyn Garcia NP     FILBIERTO 8/2013. Pt had over 2 hours 1/2 hours of desaturations at night.  O2 was ordered for her to start at 2L in September, but she stated she was feeling better than when FILIBERTO was done and refused O2. FILIBERTO 12/2013: not performed secondary to patient factors. Acute respiratory failure with hypoxia (HCC) ICD-10-CM: J96.01  ICD-9-CM: 518.81  8/22/2013 - Present        COPD, very severe (HCC) (Chronic) ICD-10-CM: J44.9  ICD-9-CM: 496  8/20/2013 - Present        Personal history of tobacco use ICD-10-CM: K64.425  ICD-9-CM: V15.82  8/19/2013 - Present        History of MI (myocardial infarction) ICD-10-CM: I25.2  ICD-9-CM: 008  8/19/2013 - Present    Overview Addendum 8/30/2018  9:20 AM by Kendra Ryder MD     STEMI 8/19/13:  Angioplasty for in-stent stenosis to LAD             RESOLVED: Chronic obstructive pulmonary disease ([facility] ICD-10-CM: J44.9  ICD-9-CM: 496  10/10/2016 - 8/30/2018        RESOLVED: Acute systolic heart failure ([facility] ICD-10-CM: I50.21  ICD-9-CM: 428.21  8/22/2013 - 8/30/2018        RESOLVED: Pulmonary hemorrhage ICD-10-CM: R04.89  ICD-9-CM: 786.30  8/20/2013 - 6/25/2019        RESOLVED: CAD (coronary artery disease) (Chronic) ICD-10-CM: I25.10  ICD-9-CM: 414.00  8/19/2013 - 8/3/2021        RESOLVED: COPD (chronic obstructive pulmonary disease) (HCC) (Chronic) ICD-10-CM: J44.9  ICD-9-CM: 496  8/19/2013 - 8/20/2013          Right IT fracture s/p Gamma nail fixation (2/4/2022, Chelsea Hinojosa MD // Daniela Xiong) with post-op acute hypoxic respiratory failure, CAD s/p cardiac catheterization and stent placement    Plan / Recommendations / Medical Decision Making:      Daily physician / PA medical management:     Right hip fracture - PT / OT // WBAT; dyspnea is barrier to progress. Poor endurance.     Hyperthyroidism - thyroid profile checked 2/7, found to be hyperthyroid. Started on methimazole.     CAD with acute chest pain - cardiac catheterization 2/7/2022, she is s/p PCI to LAD diagonal and to distal RCA. Severe MR; follow up with Cardiology.  Continue Effient.  -2/11 remote telemetry x 72hrs; if no chest pain and no events, will d/c telemetry  - chest pain this AM, tele monitor did not  a change ? indigestion ? Ranexa?  - no chest pain this AM, recognizes CP likely anxiety-related, no events on telemetry --> d/c telemetry    Chronic anemia with ABLA - Hgb 8.8< 9.2>8.9>7.1; stable; iron studies wnl.  - Hgb 8.9, low but stable    COPD, severe - continue PO steroids (40 mg x 3d, then 20mg daily x 3d, then 10mg x 2d then stop) and O2 supplementation; continue nebs; continue Mucinex  - on 1.5L O2 this AM, sats 96% at rest  - stable     Hypertension - BP fluctuating, managed medically. Hypotensive earlier today; nitro past and ACE inhibitor discontinued.   - /76 - 151/75, consider low dose ACE inhibitor  - normotensive x 48h, no changes     Hyperlipidemia - continue Zetia / Lipitor 80mg daily. Pain management - will require regular pain assessment and comprehensive pain management. Has Tylenol and oxycodone PRN.    Pneumonia prophylaxis - incentive spirometer every hour while awake. On Levaquin until ; CXR  without acute findings.      DVT risk / DVT prophylaxis - daily physician / PA exam to assess as patient is at increased risk for of thromboembolism. Mobilize as tolerated. Sequential pneumatic compression devices (SCDs) when in bed; thigh-high or knee-high thromboembolic deterrent hose when out of bed. On Effient.     GI prophylaxis - resume PPI. At times may need additional antacids, Maalox prn.     Depression - ? Pt with anxiety that is apparent. PRN benzodiazepines.  - pt is very realistic about her health and decline. She speaks calmly about having her  arrangements made and that it \"is what it is. \" Very much at peace.  Encouraged her to participate and allow self to heal in the best way possible  - anxiety overshadowing her health overall, open to trying antidepressant / anxiolytic, has used Lexapro in past but does not recall +/- effects; revisit tomorrow     General skin care / wound prevention - monitor right incision and general skin wound status daily per staff and physician / PA. At risk for failure due to impaired mobility  -2/11 POD #7 incision c/d/i     Bladder program / urinary retention - schedule voids q 6-8 hrs. Check post-void residual every shift and as needed; in-and-out catheter if post-void residual is more than 400ml.     Bowel program - at risk for constipation as a side effect of opioids, other medications, impaired mobility, etc. MiraLAX daily for regularity, Marisela-Colace for stool softener. PRN MOM, bisacodyl suppository or tablets for constipation.             Time spent was 25 minutes with over 1/2 in direct patient care/examination, consultation and coordination of care. Signed By: Anum Carroll PA-C    February 14, 2022      Physician Assistant with [facility]  Anh Germain MD, Medical Director

## 2022-02-14 NOTE — PROGRESS NOTES
INITIAL COGNITIVE ASSESSMENT AND DYSPHAGIA TREATMENT     02/14/22 1327   Time Spent With Patient   Time In [address]   Time Out 1322   Mental Status   Neurologic State Alert   Orientation Level Oriented X4   Cognition Follows commands; Appropriate decision making   Perception Appears intact   Perseveration No perseveration noted   Safety/Judgement Awareness of environment   Psychosocial   Purposeful Interaction Yes        02/14/22 1328   Verbal Expression   Primary Mode of Expression Verbal   Initiation No impairment   Repetition No impairment   Naming No impairment   Sentence Formulation No impairment   Conversation No impairment   Neuro-Linguistics/Cognitive Function   Reasoning  No impairment   Problem Solving No impairment   Mathematics No impairment  (required additional time)   Memory  Short-term   Attention  No impairment   Pragmatics   Pragmatics Impairment No impairment          02/14/22 1330   PO Trials   Consistency Presented Thin liquid; Solid; Regular   How Presented Self-fed/presented   Bolus Formation/Control No impairment   Propulsion No impairment   Oral Residue None   Initiation of Swallow No impairment   Aspiration Signs/Symptoms None   Pharyngeal Phase Characteristics No impairment, issues, or problems      Patient was observed with her lunch. Still with decreased appetite but improved breath/swallow coordination without becoming so easily fatigued and reports decreased discomfort when swallowing as thrush improves. Recommend advancement to regular textures/thin liquids. Continue pills whole one at a time. Reports she has been drinking the supplements after her meals. Patient was assessed with the ACEIII to determine need for additional cognitive therapy. She denies cognitive changes overall but does attest to the fact that she gets \"foggy\" after taking pain medications. Overall score of 91/100 with a norm score greater than 88 and breakdown as follows:   Attention: 18/18, Memory 21/26, Fluency 13/14, Language 26/26, Visuospatial 13/16. Patient near cognitive baseline with some mild fluctuations reported related to administration of pain medications. No further ST indicated at this time.       Sandra Hernandez MS, CCC-SLP

## 2022-02-14 NOTE — PROGRESS NOTES
PHYSICAL THERAPY DAILY NOTE  Time In: 6663  Time Out: 4119  Patient Seen For: PM;Gait training;Patient education; Therapeutic exercise;Transfer training; Other (see progress notes)    Subjective: Patient reporting she is hurting and does not think she can walk much. Reports she has 02 at home but does not use it all the time. Objective:Vital Signs:02 at 1 lpm during treatment. 02 sat 92 to 97%  Patient Vitals for the past 12 hrs:   Temp Pulse Resp BP SpO2   02/14/22 1602 98.8 °F (37.1 °C) 74 -- 107/78 100 %   02/14/22 1231 -- 88 -- 130/76 --   02/14/22 0910 -- -- -- -- 94 %   02/14/22 0803 97.5 °F (36.4 °C) 73 18 133/72 98 %     Pain level: 4 to 8 out of 10  Pain location:right hip  Pain interventions:Medication per order, rest, positioning,relaxation, WBAT RLE, body mechanics      Patient education:,transfer training, gait training, balance training,fall precautions, body mechanics,activity pacing, WBAT RLE precautions,Patient verbalizing understanding and demonstrating partial understanding of patient education. Recommend follow up education.         Interdisciplinary Communication:spoke with RN regarding pain level and patient request for pain medication    Other (comment) (respiratory and hip)  GROSS ASSESSMENT Daily Assessment     NA       COGNITION Daily Assessment    Alert, able to follow commands,cooperating,participating, motivated, anxious regarding pain in right hip         BED/MAT MOBILITY Daily Assessment      Rolling Right : 0 (Not tested)  Rolling Left : 0 (Not tested)  Supine to Sit : 0 (Not tested)  Sit to Supine : 0 (Not tested)       TRANSFERS Daily Assessment   Increased time and effort to complete with cues for body mechanics   Transfer Type: SPT with walker  Transfer Assistance : 4 (Minimal assistance)  Sit to Stand Assistance: Minimal assistance  Car Transfers: Not tested       GAIT Daily Assessment   Distance limited due to fatigue and pain in right hip Amount of Assistance: 4 (Minimal assistance)  Distance (ft): 10 Feet (ft) (10ft x 2 in II bars with 10 minute rest break)  Assistive Device: Other (comment) (II [facility])   Gait training in II [facility] demonstrating slow start/stop step to antalgic gait pattern with decreased stance phase R LE, decreased step length, decreased step clearance and flexed posture. Verbal and visual cues to correct gait deviations and for posture correction      STEPS or STAIRS Daily Assessment    Steps/Stairs Ambulated (#): 0  Level of Assist : 0 (Not tested)       BALANCE Daily Assessment   CGA to min assist for balance control during SPT and gait in II [facility]   Sitting - Static: Good (unsupported)  Sitting - Dynamic: Good (unsupported)  Standing - Static: Fair;Constant support (in II [facility])  Standing - Dynamic : Impaired       WHEELCHAIR MOBILITY Daily Assessment    Curbs/Ramps Assist Required (FIM Score): 0 (Not tested)  Wheelchair Setup Assist Required : 0 (Not tested)       LOWER EXTREMITY EXERCISES Daily Assessment   Increased time and effort to complete with multiple and frequent rest breaks. Cues for correct form   Extremity: Both  Exercise Type #1: Seated lower extremity strengthening (ankle DF, ankle PF, hip add isometric,hip ABD with t-band)  Sets Performed: 3  Reps Performed: 10  Level of Assist: Minimal assistance              Assessment: Progress with gait will be slow due to pain level and lung status with decreased functional endurance. Patient return to room at end of treatment and remained up in wheelchair with needs in reach, 02 at 1.5 lpm    Plan of Care: Continue with POC and progress as tolerated.      Emmie Funk, PT  2/14/2022

## 2022-02-14 NOTE — PROGRESS NOTES
PHYSICAL THERAPY DAILY NOTE  Time In: 6674  Time Out: 9681  Patient Seen For: AM;Balance activities;Gait training;Patient education; Therapeutic exercise;Transfer training; Other (see progress notes)    Subjective: Patient reporting her hip is hurting more this AM. Reports she has an ice pack on her right hip. Requesting pain medication         Objective:Vital Signs:02 at 1 lpm during treatment. 02 sat 93 to 100%  Patient Vitals for the past 12 hrs:   Temp Pulse Resp BP SpO2   02/14/22 1231 -- 88 -- 130/76 --   02/14/22 0910 -- -- -- -- 94 %   02/14/22 0803 97.5 °F (36.4 °C) 73 18 133/72 98 %   02/14/22 0444 -- 74 18 134/72 95 %   02/14/22 0100 -- 85 -- 110/60 --     Pain level: 4 to 10 out of 10  Pain location:right hip  Pain interventions:Medication per order, rest, positioning,relaxation, WBAT RLE, body mechanics      Patient education:Bed mobility training,transfer training, gait training, balance training,fall precautions, body mechanics,activity pacing, WBAT RLE precautions,Patient verbalizing understanding and demonstrating partial understanding of patient education. Recommend follow up education. Interdisciplinary Communication:spoke with RN regarding pain level and patient request for pain medication    Other (comment) (respiratory and hip)  GROSS ASSESSMENT Daily Assessment     NA       COGNITION Daily Assessment    Alert, able to follow commands,cooperating,participating, motivated, anxious regarding pain in right hip         BED/MAT MOBILITY Daily Assessment   Increased time and effort to complete with cues for body mechanics   Rolling Right : 0 (Not tested)  Rolling Left : 0 (Not tested)  Supine to Sit : 3 (Moderate assistance)  Sit to Supine : 3 (Moderate assistance)       TRANSFERS Daily Assessment   Increased time and effort to complete with cues for body mechanics   Transfer Type: SPT with walker  Transfer Assistance : 4 (Minimal assistance)  Sit to Stand Assistance:  Moderate assistance  Car Transfers: Not tested       GAIT Daily Assessment   Distance limited due to fatigue and pain in right hip Amount of Assistance: 4 (Minimal assistance)  Distance (ft): 5 Feet (ft) (5 ft x 2)  Assistive Device: Walker, rolling   Gait training with RW demonstrating slow start/stop step to antalgic gait pattern with decreased stance phase R LE, decreased step length, decreased step clearance and flexed posture. Verbal and visual cues to correct gait deviations and for posture correction      STEPS or STAIRS Daily Assessment    Steps/Stairs Ambulated (#): 0  Level of Assist : 0 (Not tested)       BALANCE Daily Assessment   CGA to min assist for balance control during SPT and gait with RW. CGA for balance control while managing clothing during toileting  Sitting - Static: Good (unsupported)  Sitting - Dynamic: Good (unsupported)  Standing - Static: Fair;Constant support (with RW)  Standing - Dynamic : Impaired       WHEELCHAIR MOBILITY Daily Assessment    Curbs/Ramps Assist Required (FIM Score): 0 (Not tested)  Wheelchair Setup Assist Required : 0 (Not tested)       LOWER EXTREMITY EXERCISES Daily Assessment   Increased time and effort to complete with multiple and frequent rest breaks. Cues for correct form   Extremity: Right  Exercise Type #1: Supine lower extremity strengthening  Sets Performed: 2  Reps Performed: 10  Level of Assist: Moderate assistance     SUPINE EXERCISES Sets Reps Comments   Ankle Pumps 2 10    Quad Sets 2 10    Glut Sets 2 10    Heel Slides 2 10    Hip Abduction 2 10 With skate   Short Arc Quad 2 10             Assessment: Progressing towards goals slowly. Body mechanics with supine<>sit<>stand slowly improving. Requires multiple and frequent rest breaks to complete a functionala ctivity due to S[place]ULD[place]. 02 sat stable on 02 at 1.5 lpm during treatment.  Tolerating TDWB to [employer] [facility] RLE       Patient return to room at end of treatment and remained up in wheelchair with needs in reach, 02 at 1.5 lpm    Plan of Care: Continue with POC and progress as tolerated.      Juan F Villalta, PT  2/14/2022

## 2022-02-15 PROBLEM — R94.31 EKG, ABNORMAL: Status: ACTIVE | Noted: 2022-02-15

## 2022-02-15 LAB
ATRIAL RATE: 75 BPM
ATRIAL RATE: 90 BPM
CALCULATED P AXIS, ECG09: 79 DEGREES
CALCULATED R AXIS, ECG10: -39 DEGREES
CALCULATED R AXIS, ECG10: 47 DEGREES
CALCULATED T AXIS, ECG11: 122 DEGREES
CALCULATED T AXIS, ECG11: 83 DEGREES
DIAGNOSIS, 93000: NORMAL
DIAGNOSIS, 93000: NORMAL
P-R INTERVAL, ECG05: 158 MS
Q-T INTERVAL, ECG07: 334 MS
Q-T INTERVAL, ECG07: 408 MS
QRS DURATION, ECG06: 68 MS
QRS DURATION, ECG06: 78 MS
QTC CALCULATION (BEZET), ECG08: 415 MS
QTC CALCULATION (BEZET), ECG08: 455 MS
VENTRICULAR RATE, ECG03: 75 BPM
VENTRICULAR RATE, ECG03: 93 BPM

## 2022-02-15 PROCEDURE — 93005 ELECTROCARDIOGRAM TRACING: CPT | Performed by: PHYSICAL MEDICINE & REHABILITATION

## 2022-02-15 PROCEDURE — 77010033678 HC OXYGEN DAILY

## 2022-02-15 PROCEDURE — 94760 N-INVAS EAR/PLS OXIMETRY 1: CPT

## 2022-02-15 PROCEDURE — 97116 GAIT TRAINING THERAPY: CPT

## 2022-02-15 PROCEDURE — 97110 THERAPEUTIC EXERCISES: CPT

## 2022-02-15 PROCEDURE — 97535 SELF CARE MNGMENT TRAINING: CPT

## 2022-02-15 PROCEDURE — 99232 SBSQ HOSP IP/OBS MODERATE 35: CPT | Performed by: PHYSICAL MEDICINE & REHABILITATION

## 2022-02-15 PROCEDURE — 74011250637 HC RX REV CODE- 250/637: Performed by: PHYSICAL MEDICINE & REHABILITATION

## 2022-02-15 PROCEDURE — 94640 AIRWAY INHALATION TREATMENT: CPT

## 2022-02-15 PROCEDURE — 65310000000 HC RM PRIVATE REHAB

## 2022-02-15 PROCEDURE — 74011636637 HC RX REV CODE- 636/637: Performed by: PHYSICAL MEDICINE & REHABILITATION

## 2022-02-15 PROCEDURE — 97530 THERAPEUTIC ACTIVITIES: CPT

## 2022-02-15 PROCEDURE — 74011250637 HC RX REV CODE- 250/637: Performed by: PHYSICIAN ASSISTANT

## 2022-02-15 PROCEDURE — 99233 SBSQ HOSP IP/OBS HIGH 50: CPT | Performed by: INTERNAL MEDICINE

## 2022-02-15 PROCEDURE — 2709999900 HC NON-CHARGEABLE SUPPLY

## 2022-02-15 PROCEDURE — 74011000250 HC RX REV CODE- 250: Performed by: PHYSICAL MEDICINE & REHABILITATION

## 2022-02-15 RX ADMIN — NYSTATIN 500000 UNITS: 100000 SUSPENSION ORAL at 08:36

## 2022-02-15 RX ADMIN — PRASUGREL 10 MG: 10 TABLET, FILM COATED ORAL at 09:00

## 2022-02-15 RX ADMIN — SENNOSIDES AND DOCUSATE SODIUM 2 TABLET: 8.6; 5 TABLET ORAL at 12:19

## 2022-02-15 RX ADMIN — METOPROLOL TARTRATE 50 MG: 50 TABLET, FILM COATED ORAL at 00:25

## 2022-02-15 RX ADMIN — LORAZEPAM 0.5 MG: 0.5 TABLET ORAL at 21:56

## 2022-02-15 RX ADMIN — TIOTROPIUM BROMIDE INHALATION SPRAY 2 PUFF: 3.12 SPRAY, METERED RESPIRATORY (INHALATION) at 09:00

## 2022-02-15 RX ADMIN — NYSTATIN 500000 UNITS: 100000 SUSPENSION ORAL at 12:21

## 2022-02-15 RX ADMIN — NITROGLYCERIN 0.4 MG: 0.4 TABLET SUBLINGUAL at 07:25

## 2022-02-15 RX ADMIN — ALBUTEROL SULFATE 2.5 MG: 2.5 SOLUTION RESPIRATORY (INHALATION) at 23:14

## 2022-02-15 RX ADMIN — IPRATROPIUM BROMIDE AND ALBUTEROL SULFATE 3 ML: .5; 3 SOLUTION RESPIRATORY (INHALATION) at 04:42

## 2022-02-15 RX ADMIN — ACETAMINOPHEN 650 MG: 325 TABLET ORAL at 05:50

## 2022-02-15 RX ADMIN — ACETAMINOPHEN 650 MG: 325 TABLET ORAL at 21:55

## 2022-02-15 RX ADMIN — METOPROLOL TARTRATE 50 MG: 50 TABLET, FILM COATED ORAL at 12:18

## 2022-02-15 RX ADMIN — METOPROLOL TARTRATE 50 MG: 50 TABLET, FILM COATED ORAL at 17:01

## 2022-02-15 RX ADMIN — GUAIFENESIN 1200 MG: 600 TABLET ORAL at 08:38

## 2022-02-15 RX ADMIN — TRAMADOL HYDROCHLORIDE 50 MG: 50 TABLET, COATED ORAL at 17:01

## 2022-02-15 RX ADMIN — ATORVASTATIN CALCIUM 80 MG: 80 TABLET, FILM COATED ORAL at 21:55

## 2022-02-15 RX ADMIN — METOPROLOL TARTRATE 50 MG: 50 TABLET, FILM COATED ORAL at 21:58

## 2022-02-15 RX ADMIN — GUAIFENESIN 1200 MG: 600 TABLET ORAL at 21:56

## 2022-02-15 RX ADMIN — PREDNISONE 40 MG: 20 TABLET ORAL at 12:18

## 2022-02-15 RX ADMIN — Medication 1 AMPULE: at 08:36

## 2022-02-15 RX ADMIN — PANTOPRAZOLE SODIUM 40 MG: 40 TABLET, DELAYED RELEASE ORAL at 17:01

## 2022-02-15 RX ADMIN — PANTOPRAZOLE SODIUM 40 MG: 40 TABLET, DELAYED RELEASE ORAL at 08:37

## 2022-02-15 RX ADMIN — ASPIRIN 81 MG: 81 TABLET ORAL at 08:37

## 2022-02-15 RX ADMIN — Medication 1 AMPULE: at 21:55

## 2022-02-15 RX ADMIN — METHIMAZOLE 10 MG: 5 TABLET ORAL at 08:37

## 2022-02-15 RX ADMIN — OXYCODONE HYDROCHLORIDE 5 MG: 5 TABLET ORAL at 08:44

## 2022-02-15 RX ADMIN — ACETAMINOPHEN 650 MG: 325 TABLET ORAL at 14:28

## 2022-02-15 RX ADMIN — OXYCODONE HYDROCHLORIDE 5 MG: 5 TABLET ORAL at 12:27

## 2022-02-15 RX ADMIN — METOPROLOL TARTRATE 50 MG: 50 TABLET, FILM COATED ORAL at 05:50

## 2022-02-15 RX ADMIN — NYSTATIN 500000 UNITS: 100000 SUSPENSION ORAL at 21:55

## 2022-02-15 RX ADMIN — ALBUTEROL SULFATE 2.5 MG: 2.5 SOLUTION RESPIRATORY (INHALATION) at 09:14

## 2022-02-15 NOTE — PROGRESS NOTES
AM Physical Therapy: Patient on medical hold this AM. Check status in PM    Frantz Wilkerson, PT  2/15/2022

## 2022-02-15 NOTE — PROGRESS NOTES
Bedside report received from Pia Samson RN. No distress on 1L via NC. Resp even and unlabored with eyes closed.   Call light within reach, bed alarm on at present, SR up x 3, bed low locked,

## 2022-02-15 NOTE — PROGRESS NOTES
Ismael Camargo MD  Medical Director  [address] [facility], [address] [facility]  Tel: [phone]       [facility] PROGRESS NOTE    [facility]  Admit Date: 2/10/2022  Admit Diagnosis:   Femur fracture, right ([address] [S72.91XA]    Subjective     Patient seen and examined. Upset about her spiriva being discontinued. I told her that I would look into it. Says that when she leans forward she has burning mid sternal; refuses anything for indigestion besides protonix bid. Did well with OT and when on commode, started complaining of cp. BP elevated. Pt appeared anxious but atypical cp. Rn gave her Ntg with immediate relief. Since recent cardiac cath requiring stents,pt with hi anxiety with cp.  No sob. sats 98% 2 L    Objective:     Current Facility-Administered Medications   Medication Dose Route Frequency    oxyCODONE IR (ROXICODONE) tablet 5-10 mg  5-10 mg Oral Q4H PRN    albuterol (PROVENTIL VENTOLIN) nebulizer solution 2.5 mg  2.5 mg Nebulization BID RT    traMADoL (ULTRAM) tablet 50 mg  50 mg Oral Q6H PRN    nystatin (MYCOSTATIN) 100,000 unit/mL oral suspension 500,000 Units  500,000 Units Oral QID    0.9% sodium chloride infusion 250 mL  250 mL IntraVENous PRN    acetaminophen (TYLENOL) tablet 650 mg  650 mg Oral Q8H    senna-docusate (PERICOLACE) 8.6-50 mg per tablet 2 Tablet  2 Tablet Oral DAILY    sodium chloride (NS) flush 5-40 mL  5-40 mL IntraVENous PRN    alcohol 62% (NOZIN) nasal  1 Ampule  1 Ampule Topical Q12H    albuterol (PROVENTIL HFA, VENTOLIN HFA, PROAIR HFA) inhaler 2 Puff  2 Puff Inhalation Q6H PRN    albuterol-ipratropium (DUO-NEB) 2.5 MG-0.5 MG/3 ML  3 mL Nebulization Q6H PRN    aspirin delayed-release tablet 81 mg  81 mg Oral DAILY    atorvastatin (LIPITOR) tablet 80 mg  80 mg Oral QHS    bisacodyL (DULCOLAX) suppository 10 mg  10 mg Rectal DAILY PRN    guaiFENesin ER (MUCINEX) tablet 1,200 mg 1,200 mg Oral Q12H    lidocaine 4 % patch 1 Patch  1 Patch TransDERmal Q24H    LORazepam (ATIVAN) tablet 0.5 mg  0.5 mg Oral Q4H PRN    methIMAzole (TAPAZOLE) tablet 10 mg  10 mg Oral DAILY    metoprolol tartrate (LOPRESSOR) tablet 50 mg  50 mg Oral Q6H    nitroglycerin (NITROSTAT) tablet 0.4 mg  0.4 mg SubLINGual PRN    ondansetron (ZOFRAN ODT) tablet 4 mg  4 mg Oral Q6H PRN    pantoprazole (PROTONIX) tablet 40 mg  40 mg Oral BID    polyethylene glycol (MIRALAX) packet 17 g  17 g Oral DAILY    prasugreL (EFFIENT) tablet 10 mg  10 mg Oral DAILY    predniSONE (DELTASONE) tablet 40 mg  40 mg Oral DAILY WITH LUNCH    senna-docusate (PERICOLACE) 8.6-50 mg per tablet 1 Tablet  1 Tablet Oral BID PRN    tiotropium bromide (SPIRIVA RESPIMAT) 2.5 mcg /actuation  2 Puff Inhalation DAILY    traZODone (DESYREL) tablet 25 mg  25 mg Oral QHS PRN       Review of Systems:   Denies chest pain, shortness of breath, cough, headache, visual problems, abdominal pain, dysuria, calf pain. Pertinent positives are as noted in the HPI, ROS unremarkable otherwise. Visit Vitals  /70   Pulse 71   Temp 98.5 °F (36.9 °C)   Resp 17   SpO2 98%        Physical Exam:   General: Alert and age appropriately oriented. Affect blunted  No acute cardiorespiratory distress. HEENT: Normocephalic, no scleral icterus. Oral mucosa moist without cyanosis. Lungs: Clear to auscultation bilaterally. Respiration even and unlabored. Heart: Regular rate and rhythm, S1, S2. No murmurs, clicks, rub or gallops. Abdomen: Soft, non-tender, not distended. Bowel sounds normoactive. No organomegaly. Genitourinary: Deferred. Neuromuscular:      No gross focal motor deficits noted. Generalized prox>distal weakness  Effort dependent   Skin/extremity: No rashes, no erythema.  No calf tenderness B LE.  Mild pedal edema r>L                                                                              Functional Assessment: Balance  Sitting - Static: Good (unsupported) (02/14/22 1600)  Sitting - Dynamic: Good (unsupported) (02/14/22 1600)  Standing - Static: Fair;Constant support (in II bars) (02/14/22 1600)  Standing - Dynamic : Impaired (02/14/22 1600)                     Maykel Golden Fall Risk Assessment:  Maykel Golden Fall Risk  Mobility: Ambulates or transfers with assist devices or assistance (02/15/22 0700)  Mobility Interventions: Patient to call before getting OOB;Utilize walker, cane, or other assistive device (02/15/22 0700)  Mentation: Alert, oriented x 3 (02/15/22 0700)  Medication: Patient receiving anticonvulsants, sedatives(tranquilizers), psychotropics or hypnotics, hypoglycemics, narcotics, sleep aids, antihypertensives, laxatives, or diuretics (02/15/22 0700)  Medication Interventions: Patient to call before getting OOB (02/15/22 0700)  Elimination: Needs assistance with toileting (02/15/22 0700)  Elimination Interventions: Call light in reach; Patient to call for help with toileting needs (02/15/22 0700)  Prior Fall History: Before admission in past 12 months _home or previous inpatient care) (02/15/22 0700)  History of Falls Interventions: Consult care management for discharge planning;Door open when patient unattended;Evaluate medications/consider consulting pharmacy (02/15/22 0700)  Total Score: 4 (02/15/22 0700)  High Fall Risk: Yes (02/15/22 0700)     Speech Assessment:  Aspiration Signs/Symptoms: None (02/14/22 1330)      Ambulation:  Gait  Distance (ft): 10 Feet (ft) (10ft x 2 in II bars with 10 minute rest break) (02/14/22 1600)  Assistive Device: Other (comment) (II bars) (02/14/22 1600)     Labs/Studies:  Recent Results (from the past 72 hour(s))   CBC W/O DIFF    Collection Time: 02/14/22  3:32 AM   Result Value Ref Range    WBC 7.7 4.3 - 11.1 K/uL    RBC 3.04 (L) 4.05 - 5.2 M/uL    HGB 8.9 (L) 11.7 - 15.4 g/dL    HCT 28.2 (L) 35.8 - 46.3 %    MCV 92.8 79.6 - 97.8 FL    MCH 29.3 26.1 - 32.9 PG    MCHC 31.6 31.4 - 35.0 g/dL    RDW 15.9 (H) 11.9 - 14.6 %    PLATELET 457 053 - 185 K/uL    MPV 9.9 9.4 - 12.3 FL    ABSOLUTE NRBC 0.00 0.0 - 0.2 K/uL   METABOLIC PANEL, BASIC    Collection Time: 02/14/22  3:32 AM   Result Value Ref Range    Sodium 136 136 - 145 mmol/L    Potassium 3.8 3.5 - 5.1 mmol/L    Chloride 103 98 - 107 mmol/L    CO2 28 21 - 32 mmol/L    Anion gap 5 (L) 7 - 16 mmol/L    Glucose 99 65 - 100 mg/dL    BUN 10 8 - 23 MG/DL    Creatinine 0.50 (L) 0.6 - 1.0 MG/DL    GFR est AA >60 >60 ml/min/1.73m2    GFR est non-AA >60 >60 ml/min/1.73m2    Calcium 8.8 8.3 - 10.4 MG/DL   EKG, 12 LEAD, INITIAL    Collection Time: 02/15/22  7:43 AM   Result Value Ref Range    Ventricular Rate 93 BPM    Atrial Rate 90 BPM    QRS Duration 68 ms    Q-T Interval 334 ms    QTC Calculation (Bezet) 415 ms    Calculated R Axis -39 degrees    Calculated T Axis 83 degrees    Diagnosis       Probable sinus rhyhtm with PACs-significant artifact  Left axis deviation  Cannot rule out Anterior infarct , age undetermined  Abnormal ECG  When compared with ECG of 10-FEB-2022 09:31,  significant artifact  Confirmed by Deirdre Ibarra MD (), KITTY ([id number]) on 2/15/2022 10:47:30 AM     EKG, 12 LEAD, INITIAL    Collection Time: 02/15/22 11:02 AM   Result Value Ref Range    Ventricular Rate 75 BPM    Atrial Rate 75 BPM    P-R Interval 158 ms    QRS Duration 78 ms    Q-T Interval 408 ms    QTC Calculation (Bezet) 455 ms    Calculated P Axis 79 degrees    Calculated R Axis 47 degrees    Calculated T Axis 122 degrees    Diagnosis       Normal sinus rhythm  Nonspecific T wave abnormality  Abnormal ECG  When compared with ECG of 15-FEB-2022 07:43,  Previous ECG has undetermined rhythm, needs review  Minimal criteria for Anterior infarct are no longer Present  ST no longer depressed in Inferior leads  T wave inversion no longer evident in Inferior leads  Nonspecific T wave abnormality now evident in Lateral leads         Assessment:     Problem List as of 2/15/2022 Date Reviewed: 2/14/2022          Codes Class Noted - Resolved    Chest pain ICD-10-CM: R07.9  ICD-9-CM: 786.50  5/31/2019 - Present        EKG, abnormal ICD-10-CM: R94.31  ICD-9-CM: 794.31  2/15/2022 - Present        * (Principal) Femur fracture, right ([facility] ICD-10-CM: Z50.92EB  ICD-9-CM: 821.00  2/11/2022 - Present        COPD with acute lower respiratory infection ([facility] ICD-10-CM: J44.0  ICD-9-CM: 496, 519.8  2/10/2022 - Present        Moderate to severe mitral regurgitation ICD-10-CM: I34.0  ICD-9-CM: 424.0  2/8/2022 - Present        Iron deficiency anemia due to chronic blood loss (Chronic) ICD-10-CM: D50.0  ICD-9-CM: 280.0  2/8/2022 - Present        Closed right hip fracture ([facility] ICD-10-CM: S72.001A  ICD-9-CM: 820.8  2/4/2022 - Present        Other fracture of right femur, initial encounter for closed fracture ([facility] ICD-10-CM: Z76.0I7A  ICD-9-CM: 821.00  2/4/2022 - Present        DNR (do not resuscitate) ICD-10-CM: Z66  ICD-9-CM: V49.86  11/9/2021 - Present    Overview Signed 11/9/2021 12:25 PM by Verner Plum, NP     POST form completed - DNR but full treatment, including intubation. No TRACH. No PEG.               Acute bilateral low back pain without sciatica ICD-10-CM: M54.50  ICD-9-CM: 724.2, 338.19  11/9/2021 - Present        Palliative care patient ICD-10-CM: Z51.5  ICD-9-CM: V66.7  7/14/2021 - Present        Ischemic heart disease, hx pci, cath/pci last 5/2019 ICD-10-CM: I25.9  ICD-9-CM: 414.9  2/24/2021 - Present        H/O carotid endarterectomy ICD-10-CM: Z98.890  ICD-9-CM: V45.89  9/21/2020 - Present        Right ear pain ICD-10-CM: H92.01  ICD-9-CM: 388.70  9/21/2020 - Present        Pulmonary mass ICD-10-CM: R91.8  ICD-9-CM: 786.6  2/11/2020 - Present        Centrilobular emphysema ([facility] [address] ICD-10-CM: J43.2  ICD-9-CM: 492.8  12/20/2019 - Present        Carotid stenosis ICD-10-CM: I65.29  ICD-9-CM: 433.10  12/3/2019 - Present        Carotid stenosis, right ICD-10-CM: M92.67  ICD-9-CM: 433.10 12/3/2019 - Present        Hypertensive urgency ICD-10-CM: I16.0  ICD-9-CM: 401.9  6/8/2019 - Present        Chronic respiratory failure with hypoxia (HCC) (Chronic) ICD-10-CM: J96.11  ICD-9-CM: 518.83, 799.02  6/8/2019 - Present        CAD S/P percutaneous coronary angioplasty ICD-10-CM: I25.10, Z98.61  ICD-9-CM: 414.01, V45.82  5/31/2019 - Present        Ventricular ectopy ICD-10-CM: I49.3  ICD-9-CM: 427.69  5/9/2017 - Present        Chronic anxiety ICD-10-CM: F41.9  ICD-9-CM: 300.00  4/27/2017 - Present        Hyperlipidemia (Chronic) ICD-10-CM: E78.5  ICD-9-CM: 272.4  10/10/2016 - Present        Essential hypertension, benign (Chronic) ICD-10-CM: I10  ICD-9-CM: 401.1  10/10/2016 - Present        Coronary artery disease involving native coronary artery of native heart without angina pectoris ICD-10-CM: I25.10  ICD-9-CM: 414.01  10/10/2016 - Present        Hypoxemia ICD-10-CM: R09.02  ICD-9-CM: 799.02  8/25/2013 - Present    Overview Signed 2/5/2014  9:08 AM by Yuly Dykes NP     FILIBERTO 8/2013. Pt had over 2 hours 1/2 hours of desaturations at night. O2 was ordered for her to start at 2L in September, but she stated she was feeling better than when FILIBERTO was done and refused O2. FILIBERTO 12/2013: not performed secondary to patient factors.               Acute respiratory failure with hypoxia (HCC) ICD-10-CM: J96.01  ICD-9-CM: 518.81  8/22/2013 - Present        COPD, very severe (HCC) (Chronic) ICD-10-CM: J44.9  ICD-9-CM: 496  8/20/2013 - Present        Personal history of tobacco use ICD-10-CM: N62.250  ICD-9-CM: V15.82  8/19/2013 - Present        History of MI (myocardial infarction) ICD-10-CM: I25.2  ICD-9-CM: 563  8/19/2013 - Present    Overview Addendum 8/30/2018  9:20 AM by Vitor Clinton MD     STEMI 8/19/13:  Angioplasty for in-stent stenosis to LAD             RESOLVED: Chronic obstructive pulmonary disease ([facility] [address] ICD-10-CM: J44.9  ICD-9-CM: 496  10/10/2016 - 8/30/2018        RESOLVED: Acute systolic heart failure ([facility] ICD-10-CM: I50.21  ICD-9-CM: 428.21  8/22/2013 - 8/30/2018        RESOLVED: Pulmonary hemorrhage ICD-10-CM: R04.89  ICD-9-CM: 786.30  8/20/2013 - 6/25/2019        RESOLVED: CAD (coronary artery disease) (Chronic) ICD-10-CM: I25.10  ICD-9-CM: 414.00  8/19/2013 - 8/3/2021        RESOLVED: COPD (chronic obstructive pulmonary disease) (HCC) (Chronic) ICD-10-CM: J44.9  ICD-9-CM: 496  8/19/2013 - 8/20/2013               Right IT fracture s/p Gamma nail fixation (2/4/2022, Elías Morrison MD // Linda Montez) with post-op acute hypoxic respiratory failure, CAD s/p cardiac catheterization and stent placement     Plan / Recommendations / Medical Decision Making:      Daily physician / PA medical management:     Right hip fracture - PT / OT // WBAT; dyspnea is barrier to progress. Poor endurance.     Hyperthyroidism - thyroid profile checked 2/7, found to be hyperthyroid. Started on methimazole.     CAD with acute chest pain - cardiac catheterization 2/7/2022, she is s/p PCI to LAD diagonal and to distal RCA. Severe MR; follow up with Cardiology. Continue Effient.  -2/11 remote telemetry x 72hrs; if no chest pain and no events, will d/c telemetry  -2/12 chest pain this AM, tele monitor did not  a change ? indigestion ? Ranexa?  -2/14 no chest pain this AM, recognizes CP likely anxiety-related, no events on telemetry --> d/c telemetry  --2/15 cp did not appear cardiac in nature. Prior to episode she was having indigestion and wanted something other than what pharmacy provided. On Protonix bid already. EKG stable at 11 am, earlier pt was hypertensive, anxious.  Artifact on a.m. ekg.      Chronic anemia with ABLA - Hgb 8.8< 9.2>8.9>7.1; stable; iron studies wnl.  -2/14 Hgb 8.9, low but stable     COPD, severe - continue PO steroids (40 mg x 3d, then 20mg daily x 3d, then 10mg x 2d then stop) and O2 supplementation; continue nebs; continue Mucinex  -2/11 on 1.5L O2 this AM, sats 96% at rest  -2/12 stable     Hypertension - BP fluctuating, managed medically. Hypotensive earlier today; nitro past and ACE inhibitor discontinued.   - /76 - 151/75, consider low dose ACE inhibitor  - normotensive x 48h, no changes  -2/15 /103; at time pt anxious. Repeated 110/70     Hyperlipidemia - continue Zetia / Lipitor 80mg daily.     Pain management - will require regular pain assessment and comprehensive pain management. Has Tylenol and oxycodone PRN.    Pneumonia prophylaxis - incentive spirometer every hour while awake. On Levaquin until ; CXR  without acute findings.      DVT risk / DVT prophylaxis - daily physician / PA exam to assess as patient is at increased risk for of thromboembolism. Mobilize as tolerated. Sequential pneumatic compression devices (SCDs) when in bed; thigh-high or knee-high thromboembolic deterrent hose when out of bed. On Effient.     GI prophylaxis - resume PPI. At times may need additional antacids, Maalox prn.     Depression - ? Pt with anxiety that is apparent. PRN benzodiazepines.  - pt is very realistic about her health and decline. She speaks calmly about having her  arrangements made and that it \"is what it is. \" Very much at peace. Encouraged her to participate and allow self to heal in the best way possible  - anxiety overshadowing her health overall, open to trying antidepressant / anxiolytic, has used Lexapro in past but does not recall +/- effects; revisit tomorrow     General skin care / wound prevention - monitor right incision and general skin wound status daily per staff and physician / PA. At risk for failure due to impaired mobility  - POD #7 incision c/d/i     Bladder program / urinary retention - schedule voids q 6-8 hrs.  Check post-void residual every shift and as needed; in-and-out catheter if post-void residual is more than 400ml.     Bowel program - at risk for constipation as a side effect of opioids, other medications, impaired mobility, etc. MiraLAX daily for regularity, Marisela-Colace for stool softener. PRN MOM, bisacodyl suppository or tablets for constipation.       Time spent was 25 minutes with over 1/2 in direct patient care/examination, consultation and coordination of care.      Signed By: Oc Call MD     February 15, 2022

## 2022-02-15 NOTE — H&P
[facility] Cardiology Initial Cardiac Evaluation                 Date of  Admission: 2/10/2022  7:50 PM     Primary Care Physician: Dr. General Hernandez  Primary Cardiologist: Dr. Leonora Rodas   Referring Physician:  Dr. Anderson Trujillo Cardiologist: Dr. Benjamín Kraus     Reason for cardiac evaluation: chest pain and abnormal EKG      Cristian Caraballo is a 66 y.o. female with prior h/o COPD on 1L O2 at night (smoked 1/2 ppd from age 11 until quit 2019),lymph edema (resolved w lymph massage) and CAD w multiple prior stents, LHC 5-2019 w patent stents in LAD, chronically occluded L circ, subtotal occlusion of stents in RCA s/p PCI. She was admitted 2/3/2022 after a fall w a R hip fracture. She underwent gamma nail insertion on 2/4. Cardiology was consulted for elevated trop/EKG changes prior to surgery. It was felt likely demand ischemia but her troponin increased to over 11K. It was felt likely NSTEMI. She underwent LHC on 2/7 (diagonal dissection had to be covered with the two 5 x 22 stent. Leading edge of the stent into the LAD a 3.0 x 12 and POBA to dRCA by Dr. Leonora Rodas. This am she had a stressful event and B/P went to 164/103. She developed sharp mid sternal chest pain lasting about 10-15 minutes. Nurse gave NTG x1 and few seconds later chest pain resolved. An EKG was ordered and very poor tracing. Her EKG improved to 125/73 as well. Cardiology was consulted for chest pain and change in EKG. Labs reviewed today and hgb 8.9. Currently sitting up in chair, chest pain free. She reports chronic LE edema just right leg worse now since hip repair.        Patient Active Problem List   Diagnosis Code    Personal history of tobacco use Z87.891    History of MI (myocardial infarction) I25.2    COPD, very severe ([address] J44.9    Acute respiratory failure with hypoxia ([facility] [address] J96.01    Hypoxemia R09.02    Hyperlipidemia E78.5    Essential hypertension, benign I10    Coronary artery disease involving native coronary artery of native heart without angina pectoris I25.10    Chronic anxiety F41.9    Ventricular ectopy I49.3    Chest pain R07.9    CAD S/P percutaneous coronary angioplasty I25.10, Z98.61    Hypertensive urgency I16.0    Chronic respiratory failure with hypoxia ([facility]) J96.11    Carotid stenosis I65.29    Carotid stenosis, right I65.21    Centrilobular emphysema ([facility]) J43.2    Pulmonary mass R91.8    H/O carotid endarterectomy Z98.890    Right ear pain H92.01    Ischemic heart disease, hx pci, cath/pci last 5/2019 I25.9    Palliative care patient Z51.5    DNR (do not resuscitate) Z66    Acute bilateral low back pain without sciatica M54.50    Closed right hip fracture ([facility]) S72.001A    Other fracture of right femur, initial encounter for closed fracture ([facility] [address] S72.8X1A    Moderate to severe mitral regurgitation I34.0    Iron deficiency anemia due to chronic blood loss D50.0    COPD with acute lower respiratory infection ([facility]) J44.0    Femur fracture, right ([facility]) S72.91XA    EKG, abnormal R94.31       Past Medical History:   Diagnosis Date    Arrhythmia     Arthritis     CAD (coronary artery disease) 2009, 8/19/2013    PCI,  Radha Carmona     Carotid stenosis, right     endarterectomy 11/25/19    Chronic anxiety 4/27/2017    COPD     recent referral to [facility] for lung transplant    Emphysema lung ([facility] [address]     GERD (gastroesophageal reflux disease)     controlled with medication    History of echocardiogram 06/07/2019    History of echocardiogram     echo 06/07/19 LVEF 55-60%    Hypertension     Nausea & vomiting     Oxygen dependent     1 liter at night    Pleurisy     Thyroid disease     pt denies      Past Surgical History:   Procedure Laterality Date    HX GYN      rebuilt cervix    HX HEART CATHETERIZATION  06/06/2019    last stent- per patient- 10 stents total    HX OTHER SURGICAL      HX TONSIL AND ADENOIDECTOMY      VASCULAR SURGERY PROCEDURE UNLIST Right 11/25/2019    carotid endarterectomy     Allergies   Allergen Reactions    Promethazine Nausea and Vomiting and Other (comments)     Severe vomiting       Family History   Problem Relation Age of Onset    No Known Problems Mother         adopted        Current Facility-Administered Medications   Medication Dose Route Frequency    oxyCODONE IR (ROXICODONE) tablet 5-10 mg  5-10 mg Oral Q4H PRN    albuterol (PROVENTIL VENTOLIN) nebulizer solution 2.5 mg  2.5 mg Nebulization BID RT    traMADoL (ULTRAM) tablet 50 mg  50 mg Oral Q6H PRN    nystatin (MYCOSTATIN) 100,000 unit/mL oral suspension 500,000 Units  500,000 Units Oral QID    0.9% sodium chloride infusion 250 mL  250 mL IntraVENous PRN    acetaminophen (TYLENOL) tablet 650 mg  650 mg Oral Q8H    senna-docusate (PERICOLACE) 8.6-50 mg per tablet 2 Tablet  2 Tablet Oral DAILY    sodium chloride (NS) flush 5-40 mL  5-40 mL IntraVENous PRN    alcohol 62% (NOZIN) nasal  1 Ampule  1 Ampule Topical Q12H    albuterol (PROVENTIL HFA, VENTOLIN HFA, PROAIR HFA) inhaler 2 Puff  2 Puff Inhalation Q6H PRN    albuterol-ipratropium (DUO-NEB) 2.5 MG-0.5 MG/3 ML  3 mL Nebulization Q6H PRN    aspirin delayed-release tablet 81 mg  81 mg Oral DAILY    atorvastatin (LIPITOR) tablet 80 mg  80 mg Oral QHS    bisacodyL (DULCOLAX) suppository 10 mg  10 mg Rectal DAILY PRN    guaiFENesin ER (MUCINEX) tablet 1,200 mg  1,200 mg Oral Q12H    lidocaine 4 % patch 1 Patch  1 Patch TransDERmal Q24H    LORazepam (ATIVAN) tablet 0.5 mg  0.5 mg Oral Q4H PRN    methIMAzole (TAPAZOLE) tablet 10 mg  10 mg Oral DAILY    metoprolol tartrate (LOPRESSOR) tablet 50 mg  50 mg Oral Q6H    nitroglycerin (NITROSTAT) tablet 0.4 mg  0.4 mg SubLINGual PRN    ondansetron (ZOFRAN ODT) tablet 4 mg  4 mg Oral Q6H PRN    pantoprazole (PROTONIX) tablet 40 mg  40 mg Oral BID    polyethylene glycol (MIRALAX) packet 17 g  17 g Oral DAILY    prasugreL (EFFIENT) tablet 10 mg  10 mg Oral DAILY    predniSONE (DELTASONE) tablet 40 mg  40 mg Oral DAILY WITH LUNCH    senna-docusate (PERICOLACE) 8.6-50 mg per tablet 1 Tablet  1 Tablet Oral BID PRN    tiotropium bromide (SPIRIVA RESPIMAT) 2.5 mcg /actuation  2 Puff Inhalation DAILY    traZODone (DESYREL) tablet 25 mg  25 mg Oral QHS PRN       Review of Systems   Constitutional: Negative for diaphoresis and malaise/fatigue. HENT: Negative for congestion. Cardiovascular: Positive for chest pain and leg swelling (chronic ). Negative for claudication, cyanosis, dyspnea on exertion, irregular heartbeat, near-syncope, orthopnea, palpitations, paroxysmal nocturnal dyspnea and syncope. Respiratory: Negative for cough, shortness of breath and wheezing. Endocrine: Negative for cold intolerance and heat intolerance. Hematologic/Lymphatic: Does not bruise/bleed easily. Skin: Negative for nail changes. Neurological: Negative for dizziness, headaches and weakness. Physical Exam  Vitals:    02/15/22 0442 02/15/22 0726 02/15/22 0853 02/15/22 0915   BP:  (!) 164/103 125/73    Pulse:  84 78    Resp:   18    Temp:   97.8 °F (36.6 °C)    SpO2: 98%  93% 90%       Physical Exam:  General: Well Developed, Well Nourished, No Acute Distress  HEENT: pupils equal and round, no abnormalities noted  Neck: supple, no JVD, no carotid bruits  Heart: S1S2 with RRR without murmurs or gallops  Lungs: Clear throughout auscultation bilaterally without adventitious sounds  Abd: soft, nontender, nondistended, with good bowel sounds  Ext: warm, right LE with 3+ and left LE 2+ edema, calves supple/nontender, pulses 2+ bilaterally  Skin: warm and dry  Psychiatric: Normal mood and affect  Neurologic: Alert and oriented X 3    Cardiographics    Telemetry: SR  ECG: EKG post PCIs showed improvement in ST depression. This am EKG repeat and poor tracing. Echocardiogram: 2/6 showed  Left Ventricle: Left ventricle size is normal. Normal wall thickness. Normal wall motion.  Low normal left ventricular systolic function. EF by 2D [employer]psons Biplane is 52%. Normal diastolic function.   Aortic Valve: Mildly thickened cusps.   Mitral Valve: Mildly thickened leaflets. Mildly calcified anterior leaflet. Moderate to severe transvalvular regurgitation with an eccentrically directed jet and may underestimate severity. Transesophageal echo  recommended for further evaluation of potentially severe mitral regurgitation.   Aorta: Dilated annulus. Labs:   Recent Labs     02/14/22  0332      K 3.8   BUN 10   CREA 0.50*   GLU 99   WBC 7.7   HGB 8.9*   HCT 28.2*           Assessment/Plan:     Assessment:      Principal Problem:    Femur fracture, right (HCC) (2/11/2022)- POD # 11; per primary team     Active Problems:    Chest pain (5/31/2019)- very atypical noted after a stressful event with extremely elevated B/P. CP and B/P improved post NTG x1. Currently chest pain free. Continue ASA/effient, BB and high intensity statin. Repeat EKG showed SR with no acute ischemia. Will follow again tomorrow. Personal history of tobacco use (8/19/2013)      History of MI (myocardial infarction) (8/19/2013)      Overview: STEMI 8/19/13:  Angioplasty for in-stent stenosis to LAD      Acute respiratory failure with hypoxia ([facility] [address] (8/22/2013)      Essential hypertension, benign (10/10/2016)      Chronic anxiety (4/27/2017)      Hypertensive urgency (6/8/2019)      Centrilobular emphysema ([facility] [address] (12/20/2019)      Acute bilateral low back pain without sciatica (11/9/2021)      Iron deficiency anemia due to chronic blood loss (2/8/2022)      EKG, abnormal (2/15/2022)- repeat EKG showed no acute ischemia. NSTEMI on 2/7 s/p LHC showing (diagonal dissection had to be covered with the two 5 x 22 stent. Leading edge of the stent into the LAD a 3.0 x 12 and POBA to dRCA by Dr. Mohsen Pierson. Continue ASA/effient, high intensity statin, BB.ACE held on 2/10 due to hypotension.        We appreciate the opportunity to participate in this patient's care.      Yariel Malik NP  Supervising MD: Dr. Ahsan Del Real

## 2022-02-15 NOTE — PROGRESS NOTES
OT Daily Note  Time In 0916   Time Out 0944     Pain: Patient had no complaint of pain. Self Care   Pt donned shirt and bra and completed grooming. During the course of the session therapist reviewed EKG results and discussed with Dr. Batool Mata and pt was decided to be on therapy hold. Session was terminated. Education   Therapy hold     Interdisciplinary Communication: Dr. Batool Mata on EKG results    Plan: Continue with POC. Pt was left in w/c with all needs within reach.      Lincoln Alvarenga OTR/L  2/15/2022

## 2022-02-15 NOTE — PROGRESS NOTES
Time In 0659   Time Out 0735     Mobility   Score Comments   Supine to Sit 4: Supervision or touching A S with encouragement   Sit to Stand 4: Supervision or touching A S   Transfer Assist 4: Supervision or touching A Transfer Type: SPT   Equipment: Rolling Walker   Comments: S     Activities of Daily Living    Score Comments   Bathing 4: Supervision or touching A Type of Shower: Bath Pack  Position: Supported sitting   Adaptive  Equipment: N/A  Comments: Just washed legs   Lower Body Dressing 4: Supervision or touching A Items Applied: Underwear and Elastic pants  Position: Standing PRN and Unsupported Sitting  Adaptive Equipment: RW  Comments: S   Toilet Transfer 3: Partial/Moderate A Transfer Type: SPT   Equipment: Grab Bars   Comments: Lifting A   Toileting Hygiene 4: Supervision or touching A Output: Urine  Comments: CGA   Education  Improvements made     Pt was in bed and agreeable to tx. Pt's performance with ADL is reflected in above chart. Pt was on toilet and she c/o chest pain. ALINE Stapleton was notified and brought a nitroglycerin pill. Session was terminated early secondary to stat EKG. Pt was left with EKG tech.      Wes Rogers OT   2/15/2022

## 2022-02-15 NOTE — PROGRESS NOTES
Report given to Ramy Enciso RN. No distress. Pt call light within reach. Aware to use call light should needs arise.

## 2022-02-16 PROCEDURE — 74011000250 HC RX REV CODE- 250: Performed by: PHYSICAL MEDICINE & REHABILITATION

## 2022-02-16 PROCEDURE — 97530 THERAPEUTIC ACTIVITIES: CPT

## 2022-02-16 PROCEDURE — 97535 SELF CARE MNGMENT TRAINING: CPT

## 2022-02-16 PROCEDURE — 99232 SBSQ HOSP IP/OBS MODERATE 35: CPT | Performed by: PHYSICAL MEDICINE & REHABILITATION

## 2022-02-16 PROCEDURE — 74011250637 HC RX REV CODE- 250/637: Performed by: PHYSICIAN ASSISTANT

## 2022-02-16 PROCEDURE — 74011250637 HC RX REV CODE- 250/637: Performed by: INTERNAL MEDICINE

## 2022-02-16 PROCEDURE — 99232 SBSQ HOSP IP/OBS MODERATE 35: CPT | Performed by: INTERNAL MEDICINE

## 2022-02-16 PROCEDURE — 74011250637 HC RX REV CODE- 250/637: Performed by: PHYSICAL MEDICINE & REHABILITATION

## 2022-02-16 PROCEDURE — 74011636637 HC RX REV CODE- 636/637: Performed by: PHYSICAL MEDICINE & REHABILITATION

## 2022-02-16 PROCEDURE — 97110 THERAPEUTIC EXERCISES: CPT

## 2022-02-16 PROCEDURE — 94760 N-INVAS EAR/PLS OXIMETRY 1: CPT

## 2022-02-16 PROCEDURE — 65310000000 HC RM PRIVATE REHAB

## 2022-02-16 PROCEDURE — 94640 AIRWAY INHALATION TREATMENT: CPT

## 2022-02-16 PROCEDURE — 77010033678 HC OXYGEN DAILY

## 2022-02-16 PROCEDURE — 97116 GAIT TRAINING THERAPY: CPT

## 2022-02-16 RX ORDER — AMLODIPINE BESYLATE 5 MG/1
5 TABLET ORAL DAILY
Status: DISCONTINUED | OUTPATIENT
Start: 2022-02-16 | End: 2022-02-16

## 2022-02-16 RX ORDER — AMLODIPINE BESYLATE 5 MG/1
7.5 TABLET ORAL DAILY
Status: DISCONTINUED | OUTPATIENT
Start: 2022-02-17 | End: 2022-02-23

## 2022-02-16 RX ORDER — BUDESONIDE AND FORMOTEROL FUMARATE DIHYDRATE 80; 4.5 UG/1; UG/1
2 AEROSOL RESPIRATORY (INHALATION)
Status: DISCONTINUED | OUTPATIENT
Start: 2022-02-16 | End: 2022-02-25 | Stop reason: HOSPADM

## 2022-02-16 RX ORDER — ESCITALOPRAM OXALATE 10 MG/1
10 TABLET ORAL DAILY
Status: DISCONTINUED | OUTPATIENT
Start: 2022-02-17 | End: 2022-02-25 | Stop reason: HOSPADM

## 2022-02-16 RX ADMIN — LORAZEPAM 0.5 MG: 0.5 TABLET ORAL at 21:42

## 2022-02-16 RX ADMIN — METOPROLOL TARTRATE 50 MG: 50 TABLET, FILM COATED ORAL at 07:00

## 2022-02-16 RX ADMIN — METHIMAZOLE 10 MG: 5 TABLET ORAL at 08:35

## 2022-02-16 RX ADMIN — METOPROLOL TARTRATE 50 MG: 50 TABLET, FILM COATED ORAL at 23:00

## 2022-02-16 RX ADMIN — ALBUTEROL SULFATE 2.5 MG: 2.5 SOLUTION RESPIRATORY (INHALATION) at 08:20

## 2022-02-16 RX ADMIN — GUAIFENESIN 1200 MG: 600 TABLET ORAL at 08:35

## 2022-02-16 RX ADMIN — ASPIRIN 81 MG: 81 TABLET ORAL at 08:35

## 2022-02-16 RX ADMIN — SENNOSIDES AND DOCUSATE SODIUM 2 TABLET: 8.6; 5 TABLET ORAL at 08:35

## 2022-02-16 RX ADMIN — GUAIFENESIN 1200 MG: 600 TABLET ORAL at 20:20

## 2022-02-16 RX ADMIN — PANTOPRAZOLE SODIUM 40 MG: 40 TABLET, DELAYED RELEASE ORAL at 08:35

## 2022-02-16 RX ADMIN — METOPROLOL TARTRATE 50 MG: 50 TABLET, FILM COATED ORAL at 12:09

## 2022-02-16 RX ADMIN — ACETAMINOPHEN 650 MG: 325 TABLET ORAL at 21:42

## 2022-02-16 RX ADMIN — ATORVASTATIN CALCIUM 80 MG: 80 TABLET, FILM COATED ORAL at 21:42

## 2022-02-16 RX ADMIN — ACETAMINOPHEN 650 MG: 325 TABLET ORAL at 07:00

## 2022-02-16 RX ADMIN — NYSTATIN 500000 UNITS: 100000 SUSPENSION ORAL at 21:42

## 2022-02-16 RX ADMIN — TIOTROPIUM BROMIDE INHALATION SPRAY 2 PUFF: 3.12 SPRAY, METERED RESPIRATORY (INHALATION) at 08:20

## 2022-02-16 RX ADMIN — PREDNISONE 40 MG: 20 TABLET ORAL at 12:08

## 2022-02-16 RX ADMIN — OXYCODONE HYDROCHLORIDE 5 MG: 5 TABLET ORAL at 14:09

## 2022-02-16 RX ADMIN — PRASUGREL 10 MG: 10 TABLET, FILM COATED ORAL at 09:00

## 2022-02-16 RX ADMIN — Medication 1 AMPULE: at 20:20

## 2022-02-16 RX ADMIN — AMLODIPINE BESYLATE 5 MG: 5 TABLET ORAL at 09:24

## 2022-02-16 RX ADMIN — OXYCODONE HYDROCHLORIDE 5 MG: 5 TABLET ORAL at 09:24

## 2022-02-16 RX ADMIN — ALBUTEROL SULFATE 2.5 MG: 2.5 SOLUTION RESPIRATORY (INHALATION) at 19:50

## 2022-02-16 RX ADMIN — METOPROLOL TARTRATE 50 MG: 50 TABLET, FILM COATED ORAL at 17:23

## 2022-02-16 RX ADMIN — Medication 1 AMPULE: at 08:35

## 2022-02-16 RX ADMIN — PANTOPRAZOLE SODIUM 40 MG: 40 TABLET, DELAYED RELEASE ORAL at 17:23

## 2022-02-16 NOTE — PROGRESS NOTES
Problem: Falls - Risk of  Goal: *Absence of Falls  Description: Document [place] Fall Risk and appropriate interventions in the flowsheet.   Outcome: Progressing Towards Goal  Note: Fall Risk Interventions:  Mobility Interventions: Utilize walker, cane, or other assistive device         Medication Interventions: Patient to call before getting OOB    Elimination Interventions: Call light in reach    History of Falls Interventions: Door open when patient unattended         Problem: Patient Education: Go to Patient Education Activity  Goal: Patient/Family Education  Outcome: Progressing Towards Goal

## 2022-02-16 NOTE — PROGRESS NOTES
2/16/2022 12:23 PM    Admit Date: 2/10/2022    Admit Diagnosis: Femur fracture, right (HCC) [S72.91XA]      Subjective:   No further cp- no sob      Objective:      Visit Vitals  BP (!) 167/81 (BP 1 Location: Left lower arm, BP Patient Position: At rest)   Pulse 80   Temp 98.1 °F (36.7 °C)   Resp 18   SpO2 95%       Physical Exam:  [facility], Well Nourished, No Acute Distress, Alert & Oriented x 3, appropriate mood. Neck- supple, no JVD  CV- regular rate and rhythm no MRG  Lung- clear bilaterally  Abd- soft, nontender, nondistended  Ext- no edema bilaterally. Skin- warm and dry        Data Review:   Recent Labs     02/14/22  0332      K 3.8   BUN 10   CREA 0.50*   WBC 7.7   HGB 8.9*   HCT 28.2*          Assessment/Plan:     Principal Problem:    Femur fracture, right ([address] (2/11/2022)    Active Problems:    Chest pain (5/31/2019) no further chest pain symptoms. Protonix added yesterday. Will work on improved blood pressure control.       Personal history of tobacco use (8/19/2013)      History of MI (myocardial infarction) (8/19/2013)      Overview: STEMI 8/19/13:  Angioplasty for in-stent stenosis to LAD      Acute respiratory failure with hypoxia ([address] (8/22/2013)      Essential hypertension, benign (10/10/2016)worse- add norvasc      Chronic anxiety (4/27/2017)      Hypertensive urgency (6/8/2019)      Centrilobular emphysema ([address] (12/20/2019)      Acute bilateral low back pain without sciatica (11/9/2021)      Iron deficiency anemia due to chronic blood loss (2/8/2022)      EKG, abnormal (2/15/2022)

## 2022-02-16 NOTE — PROGRESS NOTES
PHYSICAL THERAPY DAILY NOTE  Time In: 1102  Time Out: 1902  Patient Seen For: AM;Gait training;Patient education;Transfer training; Other (see progress notes)    Subjective: Patient reporting she sleeps in a recliner at home. Reports she is thinking about getting an adjustable bed at home. Reports she is having difficulty getting comfortable in the bed at night. Reports she has not been able to get onto her left side. Objective:Vital Signs:02 at 1.5 lpm.   Patient Vitals for the past 12 hrs:   Temp Pulse Resp BP SpO2   02/16/22 0820 -- -- -- -- 95 %   02/16/22 0700 98.1 °F (36.7 °C) 80 18 (!) 167/81 97 %     Pain level:4 to 8 out of 10  Pain location:right hip, increases with supine<>sit<>stand and in left side lying  Pain interventions:Medication per order, rest, positioning,relaxation, body mechanics      Patient education:Bed mobility training,transfer training, gait training, balance training,fall precautions, body mechanics,activity pacing, energy conservation,WBAT RLE precautions,Patient verbalizing understanding and demonstrating understanding of patient education. Recommend follow up education. Interdisciplinary Communication:spoke with OT regarding D/C plans    Other (comment) (respiratory and hip)  GROSS ASSESSMENT Daily Assessment     NA       COGNITION Daily Assessment    Alert, able to follow commands,cooperating,participating, motivated, anxious regarding pain in RLE         BED/MAT MOBILITY Daily Assessment   Increased time and effort to complete with cues for body mechanics  Using bed rail for rolling and using UEs to assist RLE    Instructed patient on how to position in left sidelying with pillows between knees to keep hip in neutral rotation and pillow behind back. Able to tolerate left sidelying x 10 minutes.  Rolling Right : 0 (Not tested)  Rolling Left : 4 (Minimal assistance)  Supine to Sit : 5 (Stand-by assistance)  Sit to Supine : 4 (Minimal assistance)       TRANSFERS Daily Assessment   Increased time and effort to complete with cues for body mechanics    BSC transfers with RW and CGA   Transfer Type: SPT with walker  Transfer Assistance : 4 (Contact guard assistance)  Sit to Stand Assistance: Stand-by assistance (from w/c, bed and recliner with ROHO in recliner,BS)  Car Transfers: Not tested       GAIT Daily Assessment    Amount of Assistance: 4 (Contact guard assistance)  Distance (ft): 10 Feet (ft) (10ft x 1)  Assistive Device: Walker, rolling   Gait training with RW demonstrating slow start/stop step to antalgic gait pattern with decreased stance phase  RLE, decreased step length, decreased step clearance and flexed posture. Verbal and visual cues to correct gait deviations. No loss of balance      STEPS or STAIRS Daily Assessment    Steps/Stairs Ambulated (#): 0  Level of Assist : 0 (Not tested)       BALANCE Daily Assessment   Static standing without a device while managing clothing during toileting. No loss of balance. Able to manage lower body clothing with cues. CGA to SBA with gait balance and SPT Sitting - Static: Good (unsupported)  Sitting - Dynamic: Good (unsupported)  Standing - Static: Fair (with RW)  Standing - Dynamic : Impaired       WHEELCHAIR MOBILITY Daily Assessment    Curbs/Ramps Assist Required (FIM Score): 0 (Not tested)  Wheelchair Setup Assist Required : 0 (Not tested)       LOWER EXTREMITY EXERCISES Daily Assessment     NA          Assessment: Bed mobility and transfers slowly improving. Gait distance slow to improve due to respiratory status. Body mechanics with SPT and bed mobility improving. Remains anxious regarding functional mobility requiring multiple and frequent rest breaks during mobility training. Patient remained in  room at end of treatment and remained up in recliner with LEs elevated and with needs in reach. Plan of Care: Continue with POC and progress as tolerated.      Zhanna Crump, PT  2/16/2022

## 2022-02-16 NOTE — PROGRESS NOTES
Time In 0837   Time Out 0958     Mobility   Score Comments   Supine to Sit 6: Independent I   Sit to Stand 4: Supervision or touching A CGA   Transfer Assist 4: Supervision or touching A Transfer Type: SPT   Equipment: Rolling Walker   Comments: S     Activities of Daily Living    Score Comments   Oral Hygiene 6: Independent I   Bathing 4: Supervision or touching A Type of Shower: Bath Pack  Position: Standing PRN and Unsupported Sitting   Adaptive  Equipment: Tub Transfer Bench, Grab Bars and Walker  Comments: S for safety   Upper Body  Dressing 5: S/U or clean-up assist Items Applied: Pullover  Position: Unsupported Sitting  Comments: S/U   Lower Body Dressing 4: Supervision or touching A Items Applied: Underwear and Elastic pants  Position: Standing PRN and Unsupported Sitting  Adaptive Equipment: RW  Comments: S   Donning/Archie Footwear 3: Partial/Moderate A Items Applied: Socks and Shoes with fasteners   Adaptive Equipment: N/A  Comments: Cueing for strategies   Education  Dressing strategies      Pt was in bed and agreeable to tx. Pt's performance with ADL is reflected in above chart. Pt requires multiple rest breaks. Worked on downward reach in various ways to improve donning shoes and socks in the gym with fair performance, but limited by anxiety. Pt was left in room with all needs within reach.      Leandro Jesus, OT   2/16/2022

## 2022-02-16 NOTE — PROGRESS NOTES
Alyse Velázquez MD  Medical Director  [address] [facility], [address] [facility]  Tel: [phone]       [facility] PROGRESS NOTE    Mohit Clifford [facility]  Admit Date: 2/10/2022  Admit Diagnosis:   Femur fracture, right ([address] [S72.91XA]    Subjective     Patient seen and examined. Not sleeping well. Seems anxious this morning about team conferences. \" I am not ready to go home. Please dont send me this week or even next week. \" many questions about meds. She is willing to start Lexapro. Has been on it before and feels she needs it now. No chest pain since yesterday a.m   Pt states that she has had lymphedema treatment in the past and feels she needs it now. Wants manual retrograde massage but doesn't want compression wraps.    Objective:     Current Facility-Administered Medications   Medication Dose Route Frequency    amLODIPine (NORVASC) tablet 5 mg  5 mg Oral DAILY    oxyCODONE IR (ROXICODONE) tablet 5-10 mg  5-10 mg Oral Q4H PRN    albuterol (PROVENTIL VENTOLIN) nebulizer solution 2.5 mg  2.5 mg Nebulization BID RT    traMADoL (ULTRAM) tablet 50 mg  50 mg Oral Q6H PRN    nystatin (MYCOSTATIN) 100,000 unit/mL oral suspension 500,000 Units  500,000 Units Oral QID    0.9% sodium chloride infusion 250 mL  250 mL IntraVENous PRN    acetaminophen (TYLENOL) tablet 650 mg  650 mg Oral Q8H    senna-docusate (PERICOLACE) 8.6-50 mg per tablet 2 Tablet  2 Tablet Oral DAILY    sodium chloride (NS) flush 5-40 mL  5-40 mL IntraVENous PRN    alcohol 62% (NOZIN) nasal  1 Ampule  1 Ampule Topical Q12H    albuterol (PROVENTIL HFA, VENTOLIN HFA, PROAIR HFA) inhaler 2 Puff  2 Puff Inhalation Q6H PRN    albuterol-ipratropium (DUO-NEB) 2.5 MG-0.5 MG/3 ML  3 mL Nebulization Q6H PRN    aspirin delayed-release tablet 81 mg  81 mg Oral DAILY    atorvastatin (LIPITOR) tablet 80 mg  80 mg Oral QHS    bisacodyL (DULCOLAX) suppository 10 mg  10 mg Rectal DAILY PRN    guaiFENesin ER (MUCINEX) tablet 1,200 mg  1,200 mg Oral Q12H    lidocaine 4 % patch 1 Patch  1 Patch TransDERmal Q24H    LORazepam (ATIVAN) tablet 0.5 mg  0.5 mg Oral Q4H PRN    methIMAzole (TAPAZOLE) tablet 10 mg  10 mg Oral DAILY    metoprolol tartrate (LOPRESSOR) tablet 50 mg  50 mg Oral Q6H    nitroglycerin (NITROSTAT) tablet 0.4 mg  0.4 mg SubLINGual PRN    ondansetron (ZOFRAN ODT) tablet 4 mg  4 mg Oral Q6H PRN    pantoprazole (PROTONIX) tablet 40 mg  40 mg Oral BID    polyethylene glycol (MIRALAX) packet 17 g  17 g Oral DAILY    prasugreL (EFFIENT) tablet 10 mg  10 mg Oral DAILY    predniSONE (DELTASONE) tablet 40 mg  40 mg Oral DAILY WITH LUNCH    senna-docusate (PERICOLACE) 8.6-50 mg per tablet 1 Tablet  1 Tablet Oral BID PRN    tiotropium bromide (SPIRIVA RESPIMAT) 2.5 mcg /actuation  2 Puff Inhalation DAILY    traZODone (DESYREL) tablet 25 mg  25 mg Oral QHS PRN       Review of Systems:   Denies chest pain, cough, headache, visual problems, abdominal pain, dysuria, calf pain. Pertinent positives are as noted in the HPI, ROS unremarkable otherwise.   + SPAULDING  Visit Vitals  BP (!) 167/81 (BP 1 Location: Left lower arm, BP Patient Position: At rest)   Pulse 80   Temp 98.1 °F (36.7 °C)   Resp 18   SpO2 95%        Physical Exam:   General: Alert and age appropriately oriented. No acute cardiorespiratory distress. HEENT: Normocephalic, no scleral icterus. Oral mucosa moist without cyanosis. Lungs: Clear to auscultation bilaterally, dec bases . No adventitious bs  Respiration even and unlabored. Heart: Regular rate and rhythm, S1, S2. No murmurs, clicks, rub or gallops. Abdomen: Soft, non-tender, not distended. Bowel sounds normoactive. No organomegaly. Genitourinary: Deferred. Neuromuscular:      No gross focal motor deficits noted. Generalized prox>distal weakness  Effort dependent   Skin/extremity: No rashes, no erythema. No calf tenderness B LE.   Pitting edema r> L 1+ . Functional Assessment:          Balance  Sitting - Static: Good (unsupported) (02/15/22 2100)  Sitting - Dynamic: Good (unsupported) (02/15/22 2100)  Standing - Static: Fair (with RW) (02/15/22 2100)  Standing - Dynamic : Impaired (02/15/22 2100)                     Darby Marking Fall Risk Assessment:  Darby Marking Fall Risk  Mobility: Ambulates or transfers with assist devices or assistance (02/16/22 0844)  Mobility Interventions: Communicate number of staff needed for ambulation/transfer;Patient to call before getting OOB;Utilize walker, cane, or other assistive device (02/16/22 0844)  Mentation: Alert, oriented x 3 (02/16/22 0844)  Medication: Patient receiving anticonvulsants, sedatives(tranquilizers), psychotropics or hypnotics, hypoglycemics, narcotics, sleep aids, antihypertensives, laxatives, or diuretics (02/16/22 0844)  Medication Interventions: Patient to call before getting OOB; Teach patient to arise slowly (02/16/22 5807)  Elimination: Needs assistance with toileting (02/16/22 0844)  Elimination Interventions: Call light in reach; Patient to call for help with toileting needs; Toileting schedule/hourly rounds (02/16/22 0844)  Prior Fall History: Before admission in past 12 months _home or previous inpatient care) (02/16/22 0844)  History of Falls Interventions:  Investigate reason for fall;Room close to nurse's station (02/16/22 0844)  Total Score: 4 (02/16/22 0844)  Standard Fall Precautions: Yes (02/16/22 0844)  High Fall Risk: Yes (02/16/22 0844)     Speech Assessment:  Aspiration Signs/Symptoms: None (02/14/22 1330)      Ambulation:  Gait  Distance (ft): 10 Feet (ft) (02/15/22 2100)  Assistive Device: Waynetta Bears, rolling (02/15/22 2100)     Labs/Studies:  Recent Results (from the past 72 hour(s))   CBC W/O DIFF    Collection Time: 02/14/22  3:32 AM   Result Value Ref Range    WBC 7.7 4.3 - 11.1 K/uL    RBC 3.04 (L) 4.05 - 5.2 M/uL HGB 8.9 (L) 11.7 - 15.4 g/dL    HCT 28.2 (L) 35.8 - 46.3 %    MCV 92.8 79.6 - 97.8 FL    MCH 29.3 26.1 - 32.9 PG    MCHC 31.6 31.4 - 35.0 g/dL    RDW 15.9 (H) 11.9 - 14.6 %    PLATELET 740 371 - 823 K/uL    MPV 9.9 9.4 - 12.3 FL    ABSOLUTE NRBC 0.00 0.0 - 0.2 K/uL   METABOLIC PANEL, BASIC    Collection Time: 02/14/22  3:32 AM   Result Value Ref Range    Sodium 136 136 - 145 mmol/L    Potassium 3.8 3.5 - 5.1 mmol/L    Chloride 103 98 - 107 mmol/L    CO2 28 21 - 32 mmol/L    Anion gap 5 (L) 7 - 16 mmol/L    Glucose 99 65 - 100 mg/dL    BUN 10 8 - 23 MG/DL    Creatinine 0.50 (L) 0.6 - 1.0 MG/DL    GFR est AA >60 >60 ml/min/1.73m2    GFR est non-AA >60 >60 ml/min/1.73m2    Calcium 8.8 8.3 - 10.4 MG/DL   EKG, 12 LEAD, INITIAL    Collection Time: 02/15/22  7:43 AM   Result Value Ref Range    Ventricular Rate 93 BPM    Atrial Rate 90 BPM    QRS Duration 68 ms    Q-T Interval 334 ms    QTC Calculation (Bezet) 415 ms    Calculated R Axis -39 degrees    Calculated T Axis 83 degrees    Diagnosis       Probable sinus rhyhtm with PACs-significant artifact  Left axis deviation  Cannot rule out Anterior infarct , age undetermined  Abnormal ECG  When compared with ECG of 10-FEB-2022 09:31,  significant artifact  Confirmed by Rosey Phelps MD (), KITTY ([id number]) on 2/15/2022 10:47:30 AM     EKG, 12 LEAD, INITIAL    Collection Time: 02/15/22 11:02 AM   Result Value Ref Range    Ventricular Rate 75 BPM    Atrial Rate 75 BPM    P-R Interval 158 ms    QRS Duration 78 ms    Q-T Interval 408 ms    QTC Calculation (Bezet) 455 ms    Calculated P Axis 79 degrees    Calculated R Axis 47 degrees    Calculated T Axis 122 degrees    Diagnosis       Normal sinus rhythm  Nonspecific T wave abnormality  Abnormal ECG  Confirmed by ST NEIL MAYES MD ()JULIA ([id number]) on 2/15/2022 12:14:27 PM         Assessment:     Problem List as of 2/16/2022 Date Reviewed: 2/14/2022          Codes Class Noted - Resolved    Chest pain ICD-10-CM: R07.9  ICD-9-CM: 786.50 5/31/2019 - Present        EKG, abnormal ICD-10-CM: R94.31  ICD-9-CM: 794.31  2/15/2022 - Present        * (Principal) Femur fracture, right ([facility] ICD-10-CM: K17.24GS  ICD-9-CM: 821.00  2/11/2022 - Present        COPD with acute lower respiratory infection ([facility] [address] ICD-10-CM: J44.0  ICD-9-CM: 496, 519.8  2/10/2022 - Present        Moderate to severe mitral regurgitation ICD-10-CM: I34.0  ICD-9-CM: 424.0  2/8/2022 - Present        Iron deficiency anemia due to chronic blood loss (Chronic) ICD-10-CM: D50.0  ICD-9-CM: 280.0  2/8/2022 - Present        Closed right hip fracture ([facility] [address] ICD-10-CM: S72.001A  ICD-9-CM: 820.8  2/4/2022 - Present        Other fracture of right femur, initial encounter for closed fracture ([facility] [address] ICD-10-CM: P03.5X0E  ICD-9-CM: 821.00  2/4/2022 - Present        DNR (do not resuscitate) ICD-10-CM: Z66  ICD-9-CM: V49.86  11/9/2021 - Present    Overview Signed 11/9/2021 12:25 PM by Felicita Scheuermann, NP     POST form completed - DNR but full treatment, including intubation. No TRACH. No PEG.               Acute bilateral low back pain without sciatica ICD-10-CM: M54.50  ICD-9-CM: 724.2, 338.19  11/9/2021 - Present        Palliative care patient ICD-10-CM: Z51.5  ICD-9-CM: V66.7  7/14/2021 - Present        Ischemic heart disease, hx pci, cath/pci last 5/2019 ICD-10-CM: I25.9  ICD-9-CM: 414.9  2/24/2021 - Present        H/O carotid endarterectomy ICD-10-CM: Z98.890  ICD-9-CM: V45.89  9/21/2020 - Present        Right ear pain ICD-10-CM: H92.01  ICD-9-CM: 388.70  9/21/2020 - Present        Pulmonary mass ICD-10-CM: R91.8  ICD-9-CM: 786.6  2/11/2020 - Present        Centrilobular emphysema ([facility] [address] ICD-10-CM: J43.2  ICD-9-CM: 492.8  12/20/2019 - Present        Carotid stenosis ICD-10-CM: I65.29  ICD-9-CM: 433.10  12/3/2019 - Present        Carotid stenosis, right ICD-10-CM: I65.21  ICD-9-CM: 433.10  12/3/2019 - Present        Hypertensive urgency ICD-10-CM: I16.0  ICD-9-CM: 401.9  6/8/2019 - Present Chronic respiratory failure with hypoxia (HCC) (Chronic) ICD-10-CM: J96.11  ICD-9-CM: 518.83, 799.02  6/8/2019 - Present        CAD S/P percutaneous coronary angioplasty ICD-10-CM: I25.10, Z98.61  ICD-9-CM: 414.01, V45.82  5/31/2019 - Present        Ventricular ectopy ICD-10-CM: I49.3  ICD-9-CM: 427.69  5/9/2017 - Present        Chronic anxiety ICD-10-CM: F41.9  ICD-9-CM: 300.00  4/27/2017 - Present        Hyperlipidemia (Chronic) ICD-10-CM: E78.5  ICD-9-CM: 272.4  10/10/2016 - Present        Essential hypertension, benign (Chronic) ICD-10-CM: I10  ICD-9-CM: 401.1  10/10/2016 - Present        Coronary artery disease involving native coronary artery of native heart without angina pectoris ICD-10-CM: I25.10  ICD-9-CM: 414.01  10/10/2016 - Present        Hypoxemia ICD-10-CM: R09.02  ICD-9-CM: 799.02  8/25/2013 - Present    Overview Signed 2/5/2014  9:08 AM by Jaclyn Garcia NP     FILIBERTO 8/2013. Pt had over 2 hours 1/2 hours of desaturations at night. O2 was ordered for her to start at 2L in September, but she stated she was feeling better than when FILIBERTO was done and refused O2. FILIBERTO 12/2013: not performed secondary to patient factors.               Acute respiratory failure with hypoxia (HCC) ICD-10-CM: J96.01  ICD-9-CM: 518.81  8/22/2013 - Present        COPD, very severe (HCC) (Chronic) ICD-10-CM: J44.9  ICD-9-CM: 496  8/20/2013 - Present        Personal history of tobacco use ICD-10-CM: Y79.064  ICD-9-CM: V15.82  8/19/2013 - Present        History of MI (myocardial infarction) ICD-10-CM: I25.2  ICD-9-CM: 362  8/19/2013 - Present    Overview Addendum 8/30/2018  9:20 AM by Jesica Chong MD     STEMI 8/19/13:  Angioplasty for in-stent stenosis to LAD             RESOLVED: Chronic obstructive pulmonary disease ([facility] ICD-10-CM: J44.9  ICD-9-CM: 496  10/10/2016 - 8/30/2018        RESOLVED: Acute systolic heart failure ([facility] ICD-10-CM: I50.21  ICD-9-CM: 428.21  8/22/2013 - 8/30/2018        RESOLVED: Pulmonary hemorrhage ICD-10-CM: R04.89  ICD-9-CM: 786.30  8/20/2013 - 6/25/2019        RESOLVED: CAD (coronary artery disease) (Chronic) ICD-10-CM: I25.10  ICD-9-CM: 414.00  8/19/2013 - 8/3/2021        RESOLVED: COPD (chronic obstructive pulmonary disease) (HCC) (Chronic) ICD-10-CM: J44.9  ICD-9-CM: 496  8/19/2013 - 8/20/2013            Right IT fracture s/p Gamma nail fixation (2/4/2022, Kaushik Edwards MD // Yohana Peraza post-op acute hypoxic respiratory failure, CAD s/p cardiac catheterization and stent placement     Plan / Recommendations / Medical Decision Making:      Daily physician / PA medical management:     Right hip fracture - PT / OT // WBAT; dyspnea is barrier to progress. Poor endurance.     Hyperthyroidism - thyroid profile checked 2/7, found to be hyperthyroid. Started on methimazole.     CAD with acute chest pain - cardiac catheterization 2/7/2022, she is s/p PCI to LAD diagonal and to distal RCA. Severe MR; follow up with Cardiology. Continue Effient.  -2/11 remote telemetry x 72hrs; if no chest pain and no events, will d/c telemetry  -2/12 chest pain this AM, tele monitor did not  a change ? indigestion ? Ranexa?  -2/14 no chest pain this AM, recognizes CP likely anxiety-related, no events on telemetry --> d/c telemetry  --2/15 cp did not appear cardiac in nature. Prior to episode she was having indigestion and wanted something other than what pharmacy provided. On Protonix bid already. EKG stable at 11 am, earlier pt was hypertensive, anxious.  Artifact on a.m. ekg.      Chronic anemia with ABLA - Hgb 8.8< 9.2>8.9>7.1; stable; iron studies wnl.  -2/14 Hgb 8.9, low but stable     COPD, severe - continue PO steroids (40 mg x 3d, then 20mg daily x 3d, then 10mg x 2d then stop) and O2 supplementation; continue nebs; continue Mucinex  -2/11 on 1.5L O2 this AM, sats 96% at rest  -2/16 stable; insistent on having Symbicort; decrease deltasone to 20 mg; was not weaning as planned      Hypertension - BP fluctuating, managed medically. Hypotensive earlier today; nitro past and ACE inhibitor discontinued.   - /76 - 151/75, consider low dose ACE inhibitor  - normotensive x 48h, no changes  -2/15 /103; at time pt anxious. Repeated 110/70  - /81 with anxiety. On Norvasc 5mg, Lopressor 50mg q 6hrs. Inc norvasc to 7.5 mg     Hyperlipidemia - continue Zetia / Lipitor 80mg daily.     Pain management - will require regular pain assessment and comprehensive pain management. Has Tylenol and oxycodone PRN.    Pneumonia prophylaxis - incentive spirometer every hour while awake. On Levaquin until ; CXR  without acute findings.      DVT risk / DVT prophylaxis - daily physician / PA exam to assess as patient is at increased risk for of thromboembolism. Mobilize as tolerated. Sequential pneumatic compression devices (SCDs) when in bed; thigh-high or knee-high thromboembolic deterrent hose when out of bed. On Effient.     GI prophylaxis - resume PPI. At times may need additional antacids, Maalox prn.     Depression - ? Pt with anxiety that is apparent. PRN benzodiazepines.  - pt is very realistic about her health and decline. She speaks calmly about having her  arrangements made and that it \"is what it is. \" Very much at peace. Encouraged her to participate and allow self to heal in the best way possible  - anxiety overshadowing her health overall, open to trying antidepressant / anxiolytic, has used Lexapro in past but does not recall +/- effects; revisit tomorrow  - agreeable to Lexapro 10mg daily     General skin care / wound prevention - monitor right incision and general skin wound status daily per staff and physician / PA. At risk for failure due to impaired mobility  - POD #7 incision c/d/i;  Dc staples      Bladder program / urinary retention - schedule voids q 6-8 hrs.  Check post-void residual every shift and as needed; in-and-out catheter if post-void residual is more than 400ml.     Bowel program - at risk for constipation as a side effect of opioids, other medications, impaired mobility, etc. MiraLAX daily for regularity, Marisela-Colace for stool softener. PRN MOM, bisacodyl suppository or tablets for constipation.     Time spent was 25 minutes with over 1/2 in direct patient care/examination, consultation and coordination of care.      Signed By: Ha Perera MD     February 16, 2022

## 2022-02-16 NOTE — PROGRESS NOTES
PHYSICAL THERAPY DAILY NOTE  Time In: 6721  Time Out: 8702  Patient Seen For: PM;Balance activities;Gait training;Patient education; Therapeutic exercise;Transfer training; Other (see progress notes)    Subjective: Patient reporting she is frustrated regarding D/C date of next week. Reports she thinks she needs more time in rehab. Reports she will not have much help at home          Objective:Vital Signs:02 at 1.5 lpm.   Patient Vitals for the past 12 hrs:   Temp Pulse Resp BP SpO2   02/16/22 0820 -- -- -- -- 95 %   02/16/22 0700 98.1 °F (36.7 °C) 80 18 (!) 167/81 97 %     Pain level:4 to 8 out of 10  Pain location:right hip  Pain interventions:Medication per order, rest, positioning,relaxation, body mechanics      Patient education:Bed mobility training,transfer training, gait training, balance training,fall precautions, body mechanics,activity pacing, energy conservation,WBAT RLE precautions,Patient verbalizing understanding and demonstrating understanding of patient education. Recommend follow up education. Interdisciplinary Communication:spoke with RN regarding pain medication schedule    Other (comment) (respiratory and hip)  GROSS ASSESSMENT Daily Assessment     NA       COGNITION Daily Assessment    Alert, able to follow commands,cooperating,participating, motivated, anxious regarding pain in RLE         BED/MAT MOBILITY Daily Assessment   Increased time and effort to complete with cues for body mechanics      Instructed patient on how to use leg  for RLE for supine<>sit Rolling Right : 0 (Not tested)  Rolling Left : 0 (Not tested)  Supine to Sit : 5 (Stand-by assistance)  Sit to Supine : 4 (Minimal assistance)       TRANSFERS Daily Assessment   Increased time and effort to complete with cues for body mechanics     Transfer Type: SPT with walker  Other: SPT with RW recliner to [facility] with CGA.  Able to manage clothing and hygiene  Transfer Assistance : 4 (Contact guard assistance)  Sit to Stand Assistance: Stand-by assistance (with RW)  Car Transfers: Not tested       GAIT Daily Assessment    Amount of Assistance: 4 (Contact guard assistance)  Distance (ft): 20 Feet (ft)  Assistive Device: Walker, rolling   Gait training with RW demonstrating slow start/stop step to antalgic gait pattern with decreased stance phase  RLE, decreased step length, decreased step clearance and flexed posture. Verbal and visual cues to correct gait deviations. No loss of balance      STEPS or STAIRS Daily Assessment    Steps/Stairs Ambulated (#): 0  Level of Assist : 0 (Not tested)       BALANCE Daily Assessment   Static standing without a device while managing clothing during toileting. No loss of balance. Able to manage lower body clothing with cues. CGA to SBA with gait balance and SPT Sitting - Static: Good (unsupported)  Sitting - Dynamic: Good (unsupported)  Standing - Static: Fair (with RW)  Standing - Dynamic : Impaired       WHEELCHAIR MOBILITY Daily Assessment    Curbs/Ramps Assist Required (FIM Score): 0 (Not tested)  Wheelchair Setup Assist Required : 0 (Not tested)       LOWER EXTREMITY EXERCISES Daily Assessment   Increased time and effort to complete with multiple and frequent rest breaks. Cues for correct form   Extremity: Right  Exercise Type #1: Supine lower extremity strengthening  Sets Performed: 3  Reps Performed: 10  Level of Assist: Moderate assistance     SUPINE EXERCISES Sets Reps Comments   Ankle Pumps 3 10    Quad Sets 3 10    Glut Sets 3 10    Heel Slides 3 10    Hip Abduction 3 10    Short Arc Quad 3 10             Assessment: Gait distance improved this PM with improved functional endurance. Tolerance to supine LE exercises improving       Patient remained in  room at end of treatment and remained up in recliner with LEs elevated and with needs in reach. Plan of Care: Continue with POC and progress as tolerated.      Frantz Wilkerson, PT  2/16/2022

## 2022-02-16 NOTE — PROGRESS NOTES
PHYSICAL THERAPY DAILY NOTE  Time In: 1302  Time Out: 5747  Patient Seen For: PM;Gait training;Patient education; Therapeutic exercise;Transfer training; Other (see progress notes)    Subjective: Patient reporting cardiologist said everything was OK. Reports she had pain medication this AM and again at lunch. Reports pain is a little better. Objective:Vital Signs:02 at 1 lpm during treatment. 02 sat 92 to 98%  Patient Vitals for the past 12 hrs:   Temp Pulse Resp BP SpO2   02/15/22 1916 97.8 °F (36.6 °C) 74 18 139/83 99 %   02/15/22 1529 -- 79 18 (!) 127/93 96 %   02/15/22 1136 98.5 °F (36.9 °C) 71 17 110/70 98 %     Pain level: 4 to 8 out of 10  Pain location:right hip  Pain interventions:Medication per order, rest, positioning,relaxation, WBAT RLE, body mechanics      Patient education:Bed mobility training,transfer training, gait training, balance training,fall precautions, body mechanics,activity pacing, WBAT RLE precautions,Patient verbalizing understanding and demonstrating partial understanding of patient education. Recommend follow up education.         Interdisciplinary Communication:spoke with RN regarding cardiologist assessment    Other (comment) (respiratory and hip)  GROSS ASSESSMENT Daily Assessment     NA       COGNITION Daily Assessment    Alert, able to follow commands,cooperating,participating, motivated, anxious regarding pain in right hip         BED/MAT MOBILITY Daily Assessment   Increased time and effort to complete with cues for body mechanics   Rolling Right : 0 (Not tested)  Rolling Left : 0 (Not tested)  Supine to Sit : 4 (Minimal assistance)  Sit to Supine : 3(moderate assist)       TRANSFERS Daily Assessment   Increased time and effort to complete with cues for body mechanics   Transfer Type: SPT with walker  Transfer Assistance : 4 (Minimal assistance)  Sit to Stand Assistance: Minimal assistance  Car Transfers: Not tested       GAIT Daily Assessment   Distance limited due to fatigue and pain in right hip Amount of Assistance: 4 (Minimal assistance)  Distance (ft): 10 Feet (ft)  Assistive Device: Walker, rolling   Gait training with RW demonstrating slow start/stop step to antalgic gait pattern with decreased stance phase R LE, decreased step length, decreased step clearance and flexed posture. Verbal and visual cues to correct gait deviations and for posture correction      STEPS or STAIRS Daily Assessment    Steps/Stairs Ambulated (#): 0  Level of Assist : 0 (Not tested)       BALANCE Daily Assessment   CGA to min assist for balance control during SPT and gait with RW. Sitting - Static: Good (unsupported)  Sitting - Dynamic: Good (unsupported)  Standing - Static: Fair (with RW)  Standing - Dynamic : Impaired       WHEELCHAIR MOBILITY Daily Assessment    Curbs/Ramps Assist Required (FIM Score): 0 (Not tested)  Wheelchair Setup Assist Required : 0 (Not tested)       LOWER EXTREMITY EXERCISES Daily Assessment   Increased time and effort to complete with multiple and frequent rest breaks. Cues for correct form   Extremity: Right  Exercise Type #1: Supine lower extremity strengthening  Sets Performed: 3  Reps Performed: 10  Level of Assist: Moderate assistance     SUPINE EXERCISES Sets Reps Comments   Ankle Pumps 3 10    Quad Sets 3 10    Glut Sets 3 10    Heel Slides 3 10    Hip Abduction 3 10 With skate   Short Arc Quad 3 10             Assessment: Improved tolerance to treatment this PM with less muscle guarding during RLE exercises and improved tolerance to wt bearing on RLE during gait training. Gait distance limited due to right hip pain, SOB and fatigue after 10 ft       Patient return to room at end of treatment and remained up in wheelchair with needs in reach, 02 at 1.0 lpm    Plan of Care: Continue with POC and progress as tolerated.      Morenita Cervantes, PT  2/15/2022

## 2022-02-17 PROCEDURE — 74011636637 HC RX REV CODE- 636/637: Performed by: PHYSICAL MEDICINE & REHABILITATION

## 2022-02-17 PROCEDURE — 77010033678 HC OXYGEN DAILY

## 2022-02-17 PROCEDURE — 97530 THERAPEUTIC ACTIVITIES: CPT

## 2022-02-17 PROCEDURE — 2709999900 HC NON-CHARGEABLE SUPPLY

## 2022-02-17 PROCEDURE — 97116 GAIT TRAINING THERAPY: CPT

## 2022-02-17 PROCEDURE — 94760 N-INVAS EAR/PLS OXIMETRY 1: CPT

## 2022-02-17 PROCEDURE — 65310000000 HC RM PRIVATE REHAB

## 2022-02-17 PROCEDURE — 74011250637 HC RX REV CODE- 250/637: Performed by: PHYSICIAN ASSISTANT

## 2022-02-17 PROCEDURE — 99232 SBSQ HOSP IP/OBS MODERATE 35: CPT | Performed by: PHYSICAL MEDICINE & REHABILITATION

## 2022-02-17 PROCEDURE — 97110 THERAPEUTIC EXERCISES: CPT

## 2022-02-17 PROCEDURE — 74011000250 HC RX REV CODE- 250: Performed by: PHYSICAL MEDICINE & REHABILITATION

## 2022-02-17 PROCEDURE — 94640 AIRWAY INHALATION TREATMENT: CPT

## 2022-02-17 PROCEDURE — 97535 SELF CARE MNGMENT TRAINING: CPT

## 2022-02-17 PROCEDURE — 74011250637 HC RX REV CODE- 250/637: Performed by: PHYSICAL MEDICINE & REHABILITATION

## 2022-02-17 RX ORDER — TORSEMIDE 20 MG/1
10 TABLET ORAL DAILY
Status: DISCONTINUED | OUTPATIENT
Start: 2022-02-17 | End: 2022-02-20

## 2022-02-17 RX ORDER — LOPERAMIDE HYDROCHLORIDE 2 MG/1
4 CAPSULE ORAL
Status: DISCONTINUED | OUTPATIENT
Start: 2022-02-17 | End: 2022-02-25 | Stop reason: HOSPADM

## 2022-02-17 RX ADMIN — GUAIFENESIN 1200 MG: 600 TABLET ORAL at 08:39

## 2022-02-17 RX ADMIN — TRAMADOL HYDROCHLORIDE 50 MG: 50 TABLET, COATED ORAL at 22:19

## 2022-02-17 RX ADMIN — OXYCODONE HYDROCHLORIDE 10 MG: 5 TABLET ORAL at 11:12

## 2022-02-17 RX ADMIN — SENNOSIDES AND DOCUSATE SODIUM 2 TABLET: 8.6; 5 TABLET ORAL at 08:38

## 2022-02-17 RX ADMIN — METOPROLOL TARTRATE 50 MG: 50 TABLET, FILM COATED ORAL at 14:23

## 2022-02-17 RX ADMIN — ALBUTEROL SULFATE 2.5 MG: 2.5 SOLUTION RESPIRATORY (INHALATION) at 07:50

## 2022-02-17 RX ADMIN — ATORVASTATIN CALCIUM 80 MG: 80 TABLET, FILM COATED ORAL at 22:20

## 2022-02-17 RX ADMIN — GUAIFENESIN 1200 MG: 600 TABLET ORAL at 22:19

## 2022-02-17 RX ADMIN — OXYCODONE HYDROCHLORIDE 10 MG: 5 TABLET ORAL at 15:05

## 2022-02-17 RX ADMIN — LOPERAMIDE HYDROCHLORIDE 4 MG: 2 CAPSULE ORAL at 14:30

## 2022-02-17 RX ADMIN — PANTOPRAZOLE SODIUM 40 MG: 40 TABLET, DELAYED RELEASE ORAL at 18:11

## 2022-02-17 RX ADMIN — IPRATROPIUM BROMIDE AND ALBUTEROL SULFATE 3 ML: .5; 3 SOLUTION RESPIRATORY (INHALATION) at 14:35

## 2022-02-17 RX ADMIN — ESCITALOPRAM OXALATE 10 MG: 10 TABLET, FILM COATED ORAL at 08:39

## 2022-02-17 RX ADMIN — ALBUTEROL SULFATE 2.5 MG: 2.5 SOLUTION RESPIRATORY (INHALATION) at 19:38

## 2022-02-17 RX ADMIN — BUDESONIDE AND FORMOTEROL FUMARATE DIHYDRATE 2 PUFF: 80; 4.5 AEROSOL RESPIRATORY (INHALATION) at 07:49

## 2022-02-17 RX ADMIN — TORSEMIDE 10 MG: 20 TABLET ORAL at 08:48

## 2022-02-17 RX ADMIN — AMLODIPINE BESYLATE 7.5 MG: 5 TABLET ORAL at 08:39

## 2022-02-17 RX ADMIN — ONDANSETRON 4 MG: 4 TABLET, ORALLY DISINTEGRATING ORAL at 14:29

## 2022-02-17 RX ADMIN — ACETAMINOPHEN 650 MG: 325 TABLET ORAL at 14:23

## 2022-02-17 RX ADMIN — METOPROLOL TARTRATE 50 MG: 50 TABLET, FILM COATED ORAL at 18:11

## 2022-02-17 RX ADMIN — PREDNISONE 40 MG: 20 TABLET ORAL at 14:23

## 2022-02-17 RX ADMIN — ACETAMINOPHEN 650 MG: 325 TABLET ORAL at 05:13

## 2022-02-17 RX ADMIN — PRASUGREL 10 MG: 10 TABLET, FILM COATED ORAL at 09:00

## 2022-02-17 RX ADMIN — BUDESONIDE AND FORMOTEROL FUMARATE DIHYDRATE 2 PUFF: 80; 4.5 AEROSOL RESPIRATORY (INHALATION) at 19:38

## 2022-02-17 RX ADMIN — ACETAMINOPHEN 650 MG: 325 TABLET ORAL at 22:19

## 2022-02-17 RX ADMIN — PANTOPRAZOLE SODIUM 40 MG: 40 TABLET, DELAYED RELEASE ORAL at 08:39

## 2022-02-17 RX ADMIN — TIOTROPIUM BROMIDE INHALATION SPRAY 2 PUFF: 3.12 SPRAY, METERED RESPIRATORY (INHALATION) at 09:00

## 2022-02-17 RX ADMIN — METHIMAZOLE 10 MG: 5 TABLET ORAL at 08:38

## 2022-02-17 RX ADMIN — LORAZEPAM 0.5 MG: 0.5 TABLET ORAL at 22:19

## 2022-02-17 RX ADMIN — ASPIRIN 81 MG: 81 TABLET ORAL at 08:38

## 2022-02-17 RX ADMIN — METOPROLOL TARTRATE 50 MG: 50 TABLET, FILM COATED ORAL at 05:13

## 2022-02-17 NOTE — PROGRESS NOTES
Spiritual Care Visit, initial visit. Visited with patient at bedside. Assisted with completion of Advance Directive. Patient's daughter had downloaded document and had essentially completed it;  obtained witnesses and patient signed the document. Daughter was to give RN a copy for patient's chart. Prayed for patient's healing and health. Visit by Clarissa Nagy, Staff .  Joleen, Faisal.MONSTER., B.A.

## 2022-02-17 NOTE — PROGRESS NOTES
Daughter requesting to see CM. Contacted Dr. René Rey and she reports discussion with pt and pt will d/c home on 2/25. After discussion pt agreeable per Dr. René Rey.  aware of POA request and referral placed. Update 0930: Met with daughter and she is agreeable to d/c home after Dr. René Rey spoke to pt. Daughter has purchased RW for pt. Discussed hospital bed and pt does not have room but plans to continue using her recliner. Pt will need w/c and will order once measurement received from therapy. Discussed family training and daughter going out of town Saturday but she report pt's ex-spouse, Alpesh Hollins, will come.  in room to assist with POA. Update [id number]: Alpesh Hollins and family training set for Thursday, 2/24 at 0830.

## 2022-02-17 NOTE — PROGRESS NOTES
[facility] CARDIOLOGY PROGRESS NOTE           2/17/2022 6:33 PM    Admit Date: 2/10/2022      Subjective:   No cp or inc sob. Some diarrhea. Objective:      Vitals:    02/17/22 1110 02/17/22 1300 02/17/22 1435 02/17/22 1810   BP: 118/75 (!) 155/77  129/64   Pulse: 75 74  74   Resp:    18   Temp:       SpO2: 96% 94% 98% 92%       Physical Exam:  General-No Acute Distress    Data Review:   No results for input(s): NA, K, MG, BUN, CREA, GLU, WBC, HGB, HCT, PLT, INR, TROIQ, CHOL, LDLC, HDL, HGBEXT, HCTEXT, PLTEXT, INREXT in the last 72 hours. No lab exists for component: TROIP, TGL, HDLC, TRP    Assessment/Plan:     Principal Problem:    Femur fracture, right ([address] (2/11/2022)        Active Problems:    Chest pain (5/31/2019)          Personal history of tobacco use (8/19/2013)          History of MI (myocardial infarction) (8/19/2013)          Acute respiratory failure with hypoxia ([address] (8/22/2013)          Essential hypertension, benign (10/10/2016)          Chronic anxiety (4/27/2017)          Hypertensive urgency (6/8/2019)          Centrilobular emphysema ([address] (12/20/2019)          Acute bilateral low back pain without sciatica (11/9/2021)          Iron deficiency anemia due to chronic blood loss (2/8/2022)        EKG, abnormal (2/15/2022)      ////    Her CV status is stable. Will follow from a distance.           Hang Adams MD  2/17/2022 6:33 PM

## 2022-02-17 NOTE — PROGRESS NOTES
OT Daily Note  Time In 1350   Time Out 1428     Pain: Pt reported no pain at the moment. Functional Mobility      Score Comments   Sit to Stand 4: Supervision or touching A S   Transfer Assist 4: Supervision or touching A Transfer Type: SPT   Equipment: Rolling Walker   Comments: S        Activity Tolerance   Pt stood for 3.5 minutes while engaging before having to stop due to toileting. Self-Care   Pt toileted with S. Pt required encouragement. Pt had runny stool. Pt walked across room with S, but poor quality gait. Education   People have good and bad days     Interdisciplinary Communication: RN Diaz [place] on [employer]    Plan: Continue with POC. Pt was left in recliner with all needs within reach.      Lauren More OTR/L  2/17/2022

## 2022-02-17 NOTE — PROGRESS NOTES
PHYSICAL THERAPY DAILY NOTE  Time In: 4683  Time Out: 1753  Patient Seen For: AM;Gait training;Patient education; Therapeutic exercise;Transfer training; Other (see progress notes)    Subjective: Patient reporting she is not doing as well this AM. Reports she is dizzy in standing, her pain level is 9 out of 10 and she is tired from AM ADLs. Reports she is not sure if she can walk         Objective:Vital Signs:02 at 1 lpm.   Patient Vitals for the past 12 hrs:   Temp Pulse Resp BP SpO2   02/17/22 1110 -- 75 -- 118/75 96 %   02/17/22 0855 97.7 °F (36.5 °C) 86 18 (!) 159/84 96 %   02/17/22 0751 -- -- -- -- 98 %     Pain level:4 to 9 out of 10  Pain location:right hip  Pain interventions:Medication per order, rest, positioning,relaxation, body mechanics      Patient education:,transfer training, gait training, balance training,fall precautions, body mechanics,activity pacing, energy conservation,WBAT RLE precautions,Patient verbalizing understanding and demonstrating understanding of patient education. Recommend follow up education.     Interdisciplinary Communication:spoke with RN regarding pain medication schedule    Other (comment) (respiratory and hip)  GROSS ASSESSMENT Daily Assessment     NA       COGNITION Daily Assessment    Alert, able to follow commands,cooperating,participating, motivated, anxious regarding pain in RLE         BED/MAT MOBILITY Daily Assessment   Increased time and effort to complete with cues for body mechanics       Rolling Right : 0 (Not tested)  Rolling Left : 0 (Not tested)  Supine to Sit : 0 (Not tested)  Sit to Supine : 0 (Not tested)       TRANSFERS Daily Assessment   Increased time and effort to complete with cues for body mechanics     Transfer Type: SPT with walker  Transfer Assistance : 4 (Contact guard assistance)  Sit to Stand Assistance: Stand-by assistance  Car Transfers: Not tested       GAIT Daily Assessment    Amount of Assistance: 4 (Contact guard assistance)  Distance (ft): 12 Feet (ft)  Assistive Device: Walker, rolling   Gait training with RW demonstrating slow start/stop step to antalgic gait pattern with decreased stance phase  RLE, decreased step length, decreased step clearance and flexed posture. Verbal and visual cues to correct gait deviations. No loss of balance      STEPS or STAIRS Daily Assessment    Steps/Stairs Ambulated (#): 0  Level of Assist : 0 (Not tested)       BALANCE Daily Assessment   Static standing without a device while managing clothing during toileting. No loss of balance. Able to manage lower body clothing with cues. CGA to SBA with gait balance and SPT Sitting - Static: Good (unsupported)  Sitting - Dynamic: Good (unsupported)  Standing - Static: Fair (with RW)  Standing - Dynamic : Impaired       WHEELCHAIR MOBILITY Daily Assessment    Curbs/Ramps Assist Required (FIM Score): 0 (Not tested)  Wheelchair Setup Assist Required : 0 (Not tested)       LOWER EXTREMITY EXERCISES Daily Assessment   Increased time and effort to complete with multiple and frequent rest breaks. Cues for correct form   Extremity: Both  Exercise Type #1: Seated lower extremity strengthening  Sets Performed: 3  Reps Performed: 10  Level of Assist: Minimal assistance     SEATED EXERCISES Sets Reps Comments   Ankle Pumps 3 10    Long Arc Quads 3 10    Hip Adduction/Ball Squeeze 3 10    Hip Abduction with yellow Theraband 1 10    Hamstring Curls with yellow Theraband 3 10               Assessment: Decline in tolerance to treatment due to patient's comments above. BP HR and 02 sat stable. Patient with nausea but no vomiting. Pain schedule did not allow for patient to get adequate pain relief prior to PT treatment. May need to have pain medication scheduled to allow for improved pain relief prior to PT. Patient cont to be anxious during treatment limiting ability to participate with exercises and functional mobility training.        Patient remained in  room at end of treatment and remained up in recliner with LEs elevated and with needs in reach. 02 at 1 lpm    Plan of Care: Continue with POC and progress as tolerated.      Carlos Villela, PT  2/17/2022

## 2022-02-17 NOTE — PROGRESS NOTES
Umer Solorio MD  Medical Director  [address] [facility], [address] [facility]  Tel: [phone]       [facility] PROGRESS NOTE    Deion Eid [facility]  Admit Date: 2/10/2022  Admit Diagnosis:   Femur fracture, right ([address] [S72.91XA]    Subjective     Patient seen and examined. Reports being on Demadex at home which helped with her edema. Wanting to be back on that. Pt reports that she doesn't feel ready to dc2/25, she is SBA/CGA and should be mod I by then. She worries bc her  works . Wants to stay until 3/5 when her dtg comes back in town. Told her that I could not justify that.      Objective:     Current Facility-Administered Medications   Medication Dose Route Frequency    torsemide (DEMADEX) tablet 10 mg  10 mg Oral DAILY    budesonide-formoterol (SYMBICORT) 80-4.5 mcg inhaler  2 Puff Inhalation BID RT    escitalopram oxalate (LEXAPRO) tablet 10 mg  10 mg Oral DAILY    amLODIPine (NORVASC) tablet 7.5 mg  7.5 mg Oral DAILY    oxyCODONE IR (ROXICODONE) tablet 5-10 mg  5-10 mg Oral Q4H PRN    albuterol (PROVENTIL VENTOLIN) nebulizer solution 2.5 mg  2.5 mg Nebulization BID RT    traMADoL (ULTRAM) tablet 50 mg  50 mg Oral Q6H PRN    nystatin (MYCOSTATIN) 100,000 unit/mL oral suspension 500,000 Units  500,000 Units Oral QID    0.9% sodium chloride infusion 250 mL  250 mL IntraVENous PRN    acetaminophen (TYLENOL) tablet 650 mg  650 mg Oral Q8H    senna-docusate (PERICOLACE) 8.6-50 mg per tablet 2 Tablet  2 Tablet Oral DAILY    sodium chloride (NS) flush 5-40 mL  5-40 mL IntraVENous PRN    alcohol 62% (NOZIN) nasal  1 Ampule  1 Ampule Topical Q12H    albuterol (PROVENTIL HFA, VENTOLIN HFA, PROAIR HFA) inhaler 2 Puff  2 Puff Inhalation Q6H PRN    albuterol-ipratropium (DUO-NEB) 2.5 MG-0.5 MG/3 ML  3 mL Nebulization Q6H PRN    aspirin delayed-release tablet 81 mg  81 mg Oral DAILY    atorvastatin (LIPITOR) tablet 80 mg  80 mg Oral QHS    bisacodyL (DULCOLAX) suppository 10 mg  10 mg Rectal DAILY PRN    guaiFENesin ER (MUCINEX) tablet 1,200 mg  1,200 mg Oral Q12H    lidocaine 4 % patch 1 Patch  1 Patch TransDERmal Q24H    LORazepam (ATIVAN) tablet 0.5 mg  0.5 mg Oral Q4H PRN    methIMAzole (TAPAZOLE) tablet 10 mg  10 mg Oral DAILY    metoprolol tartrate (LOPRESSOR) tablet 50 mg  50 mg Oral Q6H    nitroglycerin (NITROSTAT) tablet 0.4 mg  0.4 mg SubLINGual PRN    ondansetron (ZOFRAN ODT) tablet 4 mg  4 mg Oral Q6H PRN    pantoprazole (PROTONIX) tablet 40 mg  40 mg Oral BID    polyethylene glycol (MIRALAX) packet 17 g  17 g Oral DAILY    prasugreL (EFFIENT) tablet 10 mg  10 mg Oral DAILY    predniSONE (DELTASONE) tablet 40 mg  40 mg Oral DAILY WITH LUNCH    senna-docusate (PERICOLACE) 8.6-50 mg per tablet 1 Tablet  1 Tablet Oral BID PRN    tiotropium bromide (SPIRIVA RESPIMAT) 2.5 mcg /actuation  2 Puff Inhalation DAILY    traZODone (DESYREL) tablet 25 mg  25 mg Oral QHS PRN       Review of Systems:   Denies chest pain, shortness of breath, cough, headache, visual problems, abdominal pain, dysuria, calf pain. Pertinent positives are as noted in the HPI, ROS unremarkable otherwise. Visit Vitals  BP (!) 148/80 (BP 1 Location: Right lower arm, BP Patient Position: Reclining)   Pulse 80   Temp 97.3 °F (36.3 °C)   Resp 16   SpO2 98%        Physical Exam:   General: Alert and age appropriately oriented. No acute cardiorespiratory distress. HEENT: Normocephalic, no scleral icterus. Oral mucosa moist without cyanosis. Lungs: Clear to auscultation bilaterally, decreased at bases. No wheezing  Respiration even and unlabored. Heart: Regular rate and rhythm, S1, S2. No murmurs, clicks, rub or gallops. Abdomen: Soft, non-tender, not distended. Bowel sounds normoactive. No organomegaly. Genitourinary: Deferred. Neuromuscular:      No gross focal motor deficits noted.   Generalized prox>distal weakness  Effort dependent   Skin/extremity: No rashes, no erythema. No calf tenderness B LE. No edema                                                                              Functional Assessment:          Balance  Sitting - Static: Good (unsupported) (02/16/22 1600)  Sitting - Dynamic: Good (unsupported) (02/16/22 1600)  Standing - Static: Fair (with RW) (02/16/22 1600)  Standing - Dynamic : Impaired (02/16/22 1600)                     Lettie Duverney Fall Risk Assessment:  Lettie Duverney Fall Risk  Mobility: Ambulates or transfers with assist devices or assistance (02/17/22 0854)  Mobility Interventions: Communicate number of staff needed for ambulation/transfer;Patient to call before getting OOB;Utilize walker, cane, or other assistive device (02/16/22 0844)  Mentation: Alert, oriented x 3 (02/16/22 0844)  Medication: Patient receiving anticonvulsants, sedatives(tranquilizers), psychotropics or hypnotics, hypoglycemics, narcotics, sleep aids, antihypertensives, laxatives, or diuretics (02/16/22 0844)  Medication Interventions: Patient to call before getting OOB; Teach patient to arise slowly (02/16/22 2022)  Elimination: Needs assistance with toileting (02/16/22 0844)  Elimination Interventions: Call light in reach; Patient to call for help with toileting needs; Toileting schedule/hourly rounds (02/16/22 0844)  Prior Fall History: Before admission in past 12 months _home or previous inpatient care) (02/16/22 0844)  History of Falls Interventions:  Investigate reason for fall;Room close to nurse's station (02/16/22 0844)  Total Score: 4 (02/16/22 0844)  Standard Fall Precautions: Yes (02/16/22 0844)  High Fall Risk: Yes (02/16/22 0844)     Speech Assessment:  Aspiration Signs/Symptoms: None (02/14/22 1330)      Ambulation:  Gait  Distance (ft): 20 Feet (ft) (02/16/22 1600)  Assistive Device: Walker, rolling (02/16/22 1600)     Labs/Studies:  Recent Results (from the past 72 hour(s))   EKG, 12 LEAD, INITIAL    Collection Time: 02/15/22  7:43 AM Result Value Ref Range    Ventricular Rate 93 BPM    Atrial Rate 90 BPM    QRS Duration 68 ms    Q-T Interval 334 ms    QTC Calculation (Bezet) 415 ms    Calculated R Axis -39 degrees    Calculated T Axis 83 degrees    Diagnosis       Probable sinus rhyhtm with PACs-significant artifact  Left axis deviation  Cannot rule out Anterior infarct , age undetermined  Abnormal ECG  When compared with ECG of 10-FEB-2022 09:31,  significant artifact  Confirmed by Grisel Hankins MD (), KITTY ([id number]) on 2/15/2022 10:47:30 AM     EKG, 12 LEAD, INITIAL    Collection Time: 02/15/22 11:02 AM   Result Value Ref Range    Ventricular Rate 75 BPM    Atrial Rate 75 BPM    P-R Interval 158 ms    QRS Duration 78 ms    Q-T Interval 408 ms    QTC Calculation (Bezet) 455 ms    Calculated P Axis 79 degrees    Calculated R Axis 47 degrees    Calculated T Axis 122 degrees    Diagnosis       Normal sinus rhythm  Nonspecific T wave abnormality  Abnormal ECG  Confirmed by ST NEIL MAYES MD (), JULIA HINTON ([id number]) on 2/15/2022 12:14:27 PM         Assessment:     Problem List as of 2/17/2022 Date Reviewed: 2/14/2022          Codes Class Noted - Resolved    Chest pain ICD-10-CM: R07.9  ICD-9-CM: 786.50  5/31/2019 - Present        EKG, abnormal ICD-10-CM: R94.31  ICD-9-CM: 794.31  2/15/2022 - Present        * (Principal) Femur fracture, right (HCC) ICD-10-CM: Z77.50XL  ICD-9-CM: 821.00  2/11/2022 - Present        COPD with acute lower respiratory infection (HCC) ICD-10-CM: J44.0  ICD-9-CM: 496, 519.8  2/10/2022 - Present        Moderate to severe mitral regurgitation ICD-10-CM: I34.0  ICD-9-CM: 424.0  2/8/2022 - Present        Iron deficiency anemia due to chronic blood loss (Chronic) ICD-10-CM: D50.0  ICD-9-CM: 280.0  2/8/2022 - Present        Closed right hip fracture (HCC) ICD-10-CM: S72.001A  ICD-9-CM: 820.8  2/4/2022 - Present        Other fracture of right femur, initial encounter for closed fracture ([facility] ICD-10-CM: I41.0X1F  ICD-9-CM: 821.00 2/4/2022 - Present        DNR (do not resuscitate) ICD-10-CM: Z66  ICD-9-CM: V49.86  11/9/2021 - Present    Overview Signed 11/9/2021 12:25 PM by Mira Marroquin NP     POST form completed - DNR but full treatment, including intubation. No TRACH. No PEG.               Acute bilateral low back pain without sciatica ICD-10-CM: M54.50  ICD-9-CM: 724.2, 338.19  11/9/2021 - Present        Palliative care patient ICD-10-CM: Z51.5  ICD-9-CM: V66.7  7/14/2021 - Present        Ischemic heart disease, hx pci, cath/pci last 5/2019 ICD-10-CM: I25.9  ICD-9-CM: 414.9  2/24/2021 - Present        H/O carotid endarterectomy ICD-10-CM: Z98.890  ICD-9-CM: V45.89  9/21/2020 - Present        Right ear pain ICD-10-CM: H92.01  ICD-9-CM: 388.70  9/21/2020 - Present        Pulmonary mass ICD-10-CM: R91.8  ICD-9-CM: 786.6  2/11/2020 - Present        Centrilobular emphysema ([address] ICD-10-CM: J43.2  ICD-9-CM: 492.8  12/20/2019 - Present        Carotid stenosis ICD-10-CM: I65.29  ICD-9-CM: 433.10  12/3/2019 - Present        Carotid stenosis, right ICD-10-CM: I65.21  ICD-9-CM: 433.10  12/3/2019 - Present        Hypertensive urgency ICD-10-CM: I16.0  ICD-9-CM: 401.9  6/8/2019 - Present        Chronic respiratory failure with hypoxia (HCC) (Chronic) ICD-10-CM: J96.11  ICD-9-CM: 518.83, 799.02  6/8/2019 - Present        CAD S/P percutaneous coronary angioplasty ICD-10-CM: I25.10, Z98.61  ICD-9-CM: 414.01, V45.82  5/31/2019 - Present        Ventricular ectopy ICD-10-CM: I49.3  ICD-9-CM: 427.69  5/9/2017 - Present        Chronic anxiety ICD-10-CM: F41.9  ICD-9-CM: 300.00  4/27/2017 - Present        Hyperlipidemia (Chronic) ICD-10-CM: E78.5  ICD-9-CM: 272.4  10/10/2016 - Present        Essential hypertension, benign (Chronic) ICD-10-CM: I10  ICD-9-CM: 401.1  10/10/2016 - Present        Coronary artery disease involving native coronary artery of native heart without angina pectoris ICD-10-CM: I25.10  ICD-9-CM: 414.01  10/10/2016 - Present Hypoxemia ICD-10-CM: R09.02  ICD-9-CM: 799.02  8/25/2013 - Present    Overview Signed 2/5/2014  9:08 AM by Kashmir Woods NP     FILIBERTO 8/2013. Pt had over 2 hours 1/2 hours of desaturations at night. O2 was ordered for her to start at 2L in September, but she stated she was feeling better than when FILIBERTO was done and refused O2. FILIBERTO 12/2013: not performed secondary to patient factors. Acute respiratory failure with hypoxia (HCC) ICD-10-CM: J96.01  ICD-9-CM: 518.81  8/22/2013 - Present        COPD, very severe (HCC) (Chronic) ICD-10-CM: J44.9  ICD-9-CM: 496  8/20/2013 - Present        Personal history of tobacco use ICD-10-CM: A11.221  ICD-9-CM: V15.82  8/19/2013 - Present        History of MI (myocardial infarction) ICD-10-CM: I25.2  ICD-9-CM: 686  8/19/2013 - Present    Overview Addendum 8/30/2018  9:20 AM by Venice Arora MD     STEMI 8/19/13:  Angioplasty for in-stent stenosis to LAD             RESOLVED: Chronic obstructive pulmonary disease ([facility] ICD-10-CM: J44.9  ICD-9-CM: 496  10/10/2016 - 8/30/2018        RESOLVED: Acute systolic heart failure ([facility] [address] ICD-10-CM: I50.21  ICD-9-CM: 428.21  8/22/2013 - 8/30/2018        RESOLVED: Pulmonary hemorrhage ICD-10-CM: R04.89  ICD-9-CM: 786.30  8/20/2013 - 6/25/2019        RESOLVED: CAD (coronary artery disease) (Chronic) ICD-10-CM: I25.10  ICD-9-CM: 414.00  8/19/2013 - 8/3/2021        RESOLVED: COPD (chronic obstructive pulmonary disease) (HCC) (Chronic) ICD-10-CM: J44.9  ICD-9-CM: 496  8/19/2013 - 8/20/2013               Right IT fracture s/p Gamma nail fixation (2/4/2022, Stan Magaña MD // Elin Linton post-op acute hypoxic respiratory failure, CAD s/p cardiac catheterization and stent placement     Plan / Recommendations / Medical Decision Making:      Daily physician / PA medical management:     Right hip fracture - PT / OT // WBAT; dyspnea is barrier to progress.  Poor endurance.     Hyperthyroidism - thyroid profile checked 2/7, found to be hyperthyroid. Started on methimazole.     CAD with acute chest pain - cardiac catheterization 2/7/2022, she is s/p PCI to LAD diagonal and to distal RCA. Severe MR; follow up with Cardiology. Continue Effient.  -2/11 remote telemetry x 72hrs; if no chest pain and no events, will d/c telemetry  -2/12 chest pain this AM, tele monitor did not  a change ? indigestion ? Ranexa?  -2/14 no chest pain this AM, recognizes CP likely anxiety-related, no events on telemetry --> d/c telemetry  --2/15 cp did not appear cardiac in nature. Prior to episode she was having indigestion and wanted something other than what pharmacy provided. On Protonix bid already. EKG stable at 11 am, earlier pt was hypertensive, anxious. Artifact on a.m. ekg.  -2/17 no further cp      Chronic anemia with ABLA - Hgb 8.8< 9.2>8.9>7.1; stable; iron studies wnl.  -2/14 Hgb 8.9, low but stable     COPD, severe - continue PO steroids (40 mg x 3d, then 20mg daily x 3d, then 10mg x 2d then stop) and O2 supplementation; continue nebs; continue Mucinex  -2/11 on 1.5L O2 this AM, sats 96% at rest  -2/16 stable; insistent on having Symbicort; decrease deltasone to 20 mg; was not weaning as planned      Hypertension - BP fluctuating, managed medically. Hypotensive earlier today; nitro past and ACE inhibitor discontinued.   -2/12 /76 - 151/75, consider low dose ACE inhibitor  -2/14 normotensive x 48h, no changes  -2/15 /103; at time pt anxious. Repeated 110/70  -2/16 /81 with anxiety. On Norvasc 5mg, Lopressor 50mg q 6hrs. Inc norvasc to 7.5 mg  -2/17 BP poorly controlled. Dr Nancy Blum following . Iconsider increasing norvasc to 10mg daily; pt reports being on 10 mg demadex at home due to her edema; will restart     Hyperlipidemia - continue Zetia / Lipitor 80mg daily.     Pain management - will require regular pain assessment and comprehensive pain management. Has Tylenol and oxycodone PRN.      Pneumonia prophylaxis - incentive spirometer every hour while awake. On Levaquin until ; CXR  without acute findings.      DVT risk / DVT prophylaxis - daily physician / PA exam to assess as patient is at increased risk for of thromboembolism. Mobilize as tolerated. Sequential pneumatic compression devices (SCDs) when in bed; thigh-high or knee-high thromboembolic deterrent hose when out of bed. On Effient.     GI prophylaxis - resume PPI. At times may need additional antacids, Maalox prn.     Depression - ? Pt with anxiety that is apparent. PRN benzodiazepines.  - pt is very realistic about her health and decline. She speaks calmly about having her  arrangements made and that it \"is what it is. \" Very much at peace. Encouraged her to participate and allow self to heal in the best way possible  - anxiety overshadowing her health overall, open to trying antidepressant / anxiolytic, has used Lexapro in past but does not recall +/- effects; revisit tomorrow  - agreeable to Lexapro 10mg daily     General skin care / wound prevention - monitor right incision and general skin wound status daily per staff and physician / PA. At risk for failure due to impaired mobility  - POD #7 incision c/d/i;  Dc staples      Bladder program / urinary retention - schedule voids q 6-8 hrs. Check post-void residual every shift and as needed; in-and-out catheter if post-void residual is more than 400ml.     Bowel program - at risk for constipation as a side effect of opioids, other medications, impaired mobility, etc. MiraLAX daily for regularity, Marisela-Colace for stool softener. PRN MOM, bisacodyl suppository or tablets for constipation. Time spent was 25 minutes with over 1/2 in direct patient care/examination, consultation and coordination of care.      Signed By: Marcie Smith MD     2022

## 2022-02-17 NOTE — PROGRESS NOTES
Team conference today with discharge set for Friday, 2/25. BSN, CM met with pt at bedside and updated on d/c date. HH needs include RN/PT/OT/aide. DME needs include hospital bed, RW, and w/c. Pt has home oxygen. Pt reports she does not want to go home as daughter is going back to [place] on Saturday to have surgery and will not return till 3/2/2022. Pt aware [facility] cannot hold her till this date. Pt refusing to return home without daughter. Pt requested SNF. Discussed options and possible out of pocket needs pending length of stay. Pt aware if SNF pursued will likely d/c to SNF sooner then 2/25. Pt reports being anxious about d/c home with ex-spouse and wants daughters support. Pt would like [facility] but wants daughter to be contacted first. Daughter contacted CM shortly after visit with pt. Daughter would like pt to stay till 3/2. Discussed pt current progress with daughter. Agreed to discuss with Dr. Raina Mathews. Daughter also wants to complete POA and living will. Daughter aware  can asisst with POA but living will cannot be complete by hospital staff. Daughter plans to arrive around 1000 tomorrow. Contacted Dr. Raina Mathews and she plans to meet with pt in am to discuss. CM to continue to follow and monitor for any further needs.

## 2022-02-17 NOTE — PROGRESS NOTES
OT WEEKLY PROGRESS NOTE    Time In: 0830  Time Out: 0916    Goals:      STG 1: Pt will be set-up with toileting by 2/18/22 to prevent skin breakdown. 2/17/2022 Pt is CGA; extend goal until d/c  LTG 1: Pt will be independent with toileting by 2/25/22 to prevent skin breakdown.       STG 2: Pt will be moderate A with LB dressing by 2/18/22 to reduce risk of falls. 2/17/2022 Goal met   LTG 2: Pt will be set-up with LB dressing by 2/25/22 to reduce risk of falls.       STG 3: Pt will be set-up with bathing by 2/18/22 to promote good skin integrity. 2/17/2022 Goal met   LTG 3: Pt will be independent with bathing by 2/25/22 to promote good skin integrity.       STG 4: Pt will be able to get a snack from the fridge with touching A by 2/18/22 to promote proper nutrition and hydration. 2/17/2022 Goal met   LTG 4: Pt will be able to get a snack from the fridge with independent by 2/25/22 to promote proper nutrition and hydration.       LTG 5: Pt/caregiver will verbalize  understanding of OT recommendations regarding ADL status, functional transfer status, home safety, DME, AE, energy conservation techniques, safety awareness, activity tolerance, and/or follow-up therapy to increase safety with functional tasks upon discharge. 2/17/2022 Goal ongoing    Mobility   Score Comments   Supine to Sit 6: Independent I   Sit to Stand 4: Supervision or touching A S   Transfer Assist 4: Supervision or touching A Transfer Type: SPT   Equipment: Rolling Walker   Comments: S     Activities of Daily Living    Score Comments   Eating 6: Independent I   Oral Hyigene 6: Independent I   Bathing 4: Supervision or touching A Type of Shower: Bath Pack  Position: Standing PRN and Unsupported Sitting   Adaptive  Equipment: Walker  Comments: Cueing to wash all body parts   Upper Body  Dressing 5: S/U or clean-up assist Items Applied: Pullover  Position: Unsupported Sitting  Comments: S/U   Lower Body Dressing 4: Supervision or touching A Items Applied: Underwear and Elastic pants  Position: Standing PRN and Unsupported Sitting  Adaptive Equipment: N/A  Comments: S   Donning/[place] Footwear 3: Partial/Moderate A Items Applied: Socks and Shoes with fasteners   Adaptive Equipment: N/A  Comments: Usually A for R side; today A for all parts secondary to time limitations   Toilet Transfer 4: Supervision or touching A Transfer Type: SPT   Equipment: Rolling Walker   Comments: [facility] 4: Supervision or touching A Output: Urine and BM  Comments: CGA   Education  Discharge date   Family Training: Not indicated at this time    Summary of Progress: Pt is making improvements in pain management, compensatory strategies, and balance allowing for improvements in bathing, dressing, and transfers. Pt's barriers are anxiety. Pt would benefit from continued skilled occupational therapy. Summary of Session: Pt was in bed and agreeable to tx. Pt's performance with ADL is reflected in above chart. Pt had a good performance this morning. Pt was left in recliner with all needs within reach.      Beatrice Connor OT  2/17/2022

## 2022-02-18 PROCEDURE — 97530 THERAPEUTIC ACTIVITIES: CPT

## 2022-02-18 PROCEDURE — 97150 GROUP THERAPEUTIC PROCEDURES: CPT

## 2022-02-18 PROCEDURE — 74011250637 HC RX REV CODE- 250/637: Performed by: PHYSICAL MEDICINE & REHABILITATION

## 2022-02-18 PROCEDURE — 2709999900 HC NON-CHARGEABLE SUPPLY

## 2022-02-18 PROCEDURE — 99232 SBSQ HOSP IP/OBS MODERATE 35: CPT | Performed by: PHYSICIAN ASSISTANT

## 2022-02-18 PROCEDURE — 77030008031

## 2022-02-18 PROCEDURE — 94760 N-INVAS EAR/PLS OXIMETRY 1: CPT

## 2022-02-18 PROCEDURE — 94640 AIRWAY INHALATION TREATMENT: CPT

## 2022-02-18 PROCEDURE — 97535 SELF CARE MNGMENT TRAINING: CPT

## 2022-02-18 PROCEDURE — 65310000000 HC RM PRIVATE REHAB

## 2022-02-18 PROCEDURE — 77010033678 HC OXYGEN DAILY

## 2022-02-18 PROCEDURE — 74011636637 HC RX REV CODE- 636/637: Performed by: PHYSICIAN ASSISTANT

## 2022-02-18 PROCEDURE — 97110 THERAPEUTIC EXERCISES: CPT

## 2022-02-18 PROCEDURE — 74011250637 HC RX REV CODE- 250/637: Performed by: PHYSICIAN ASSISTANT

## 2022-02-18 PROCEDURE — 74011000250 HC RX REV CODE- 250: Performed by: PHYSICAL MEDICINE & REHABILITATION

## 2022-02-18 PROCEDURE — 97116 GAIT TRAINING THERAPY: CPT

## 2022-02-18 RX ADMIN — BUDESONIDE AND FORMOTEROL FUMARATE DIHYDRATE 2 PUFF: 80; 4.5 AEROSOL RESPIRATORY (INHALATION) at 07:22

## 2022-02-18 RX ADMIN — METOPROLOL TARTRATE 50 MG: 50 TABLET, FILM COATED ORAL at 17:51

## 2022-02-18 RX ADMIN — PREDNISONE 30 MG: 20 TABLET ORAL at 12:38

## 2022-02-18 RX ADMIN — TORSEMIDE 10 MG: 20 TABLET ORAL at 09:18

## 2022-02-18 RX ADMIN — AMLODIPINE BESYLATE 7.5 MG: 5 TABLET ORAL at 09:17

## 2022-02-18 RX ADMIN — LORAZEPAM 0.5 MG: 0.5 TABLET ORAL at 21:55

## 2022-02-18 RX ADMIN — METHIMAZOLE 10 MG: 5 TABLET ORAL at 09:18

## 2022-02-18 RX ADMIN — PANTOPRAZOLE SODIUM 40 MG: 40 TABLET, DELAYED RELEASE ORAL at 06:30

## 2022-02-18 RX ADMIN — ALBUTEROL SULFATE 2.5 MG: 2.5 SOLUTION RESPIRATORY (INHALATION) at 22:36

## 2022-02-18 RX ADMIN — BUDESONIDE AND FORMOTEROL FUMARATE DIHYDRATE 2 PUFF: 80; 4.5 AEROSOL RESPIRATORY (INHALATION) at 22:37

## 2022-02-18 RX ADMIN — OXYCODONE HYDROCHLORIDE 5 MG: 5 TABLET ORAL at 06:15

## 2022-02-18 RX ADMIN — ASPIRIN 81 MG: 81 TABLET ORAL at 09:18

## 2022-02-18 RX ADMIN — PANTOPRAZOLE SODIUM 40 MG: 40 TABLET, DELAYED RELEASE ORAL at 17:51

## 2022-02-18 RX ADMIN — METOPROLOL TARTRATE 50 MG: 50 TABLET, FILM COATED ORAL at 23:34

## 2022-02-18 RX ADMIN — TIOTROPIUM BROMIDE INHALATION SPRAY 2 PUFF: 3.12 SPRAY, METERED RESPIRATORY (INHALATION) at 07:22

## 2022-02-18 RX ADMIN — METOPROLOL TARTRATE 50 MG: 50 TABLET, FILM COATED ORAL at 12:39

## 2022-02-18 RX ADMIN — GUAIFENESIN 1200 MG: 600 TABLET ORAL at 09:18

## 2022-02-18 RX ADMIN — ESCITALOPRAM OXALATE 10 MG: 10 TABLET, FILM COATED ORAL at 09:18

## 2022-02-18 RX ADMIN — ALBUTEROL SULFATE 2.5 MG: 2.5 SOLUTION RESPIRATORY (INHALATION) at 07:19

## 2022-02-18 RX ADMIN — ACETAMINOPHEN 650 MG: 325 TABLET ORAL at 14:06

## 2022-02-18 RX ADMIN — ACETAMINOPHEN 650 MG: 325 TABLET ORAL at 06:15

## 2022-02-18 RX ADMIN — ACETAMINOPHEN 650 MG: 325 TABLET ORAL at 21:55

## 2022-02-18 RX ADMIN — GUAIFENESIN 1200 MG: 600 TABLET ORAL at 21:55

## 2022-02-18 RX ADMIN — ATORVASTATIN CALCIUM 80 MG: 80 TABLET, FILM COATED ORAL at 21:55

## 2022-02-18 RX ADMIN — METOPROLOL TARTRATE 50 MG: 50 TABLET, FILM COATED ORAL at 06:15

## 2022-02-18 RX ADMIN — PRASUGREL 10 MG: 10 TABLET, FILM COATED ORAL at 09:00

## 2022-02-18 RX ADMIN — OXYCODONE HYDROCHLORIDE 10 MG: 5 TABLET ORAL at 14:05

## 2022-02-18 NOTE — PROGRESS NOTES
Problem: Falls - Risk of  Goal: *Absence of Falls  Description: Document Ace Croft Fall Risk and appropriate interventions in the flowsheet.   Outcome: Progressing Towards Goal  Note: Fall Risk Interventions:  Mobility Interventions: Patient to call before getting OOB,Utilize walker, cane, or other assistive device         Medication Interventions: Patient to call before getting OOB,Teach patient to arise slowly    Elimination Interventions: Call light in reach,Patient to call for help with toileting needs,Toileting schedule/hourly rounds    History of Falls Interventions: Consult care management for discharge planning,Door open when patient unattended         Problem: Patient Education: Go to Patient Education Activity  Goal: Patient/Family Education  Outcome: Progressing Towards Goal

## 2022-02-18 NOTE — PROGRESS NOTES
PHYSICAL THERAPY WEEKLY PROGRESS NOTE  Time In: 0193  Time Out: 3363  Patient Seen For: AM;Gait training;Patient education; Therapeutic exercise;Transfer training; Other (see progress notes)    Subjective: Patient reporting she is still hurting despite having pain medication. Reports she walked a long distance with OT and she is sore         Objective:Vital Signs:02 at 1.5 lpm.   Patient Vitals for the past 12 hrs:   Temp Pulse Resp BP SpO2   02/18/22 0745 98.3 °F (36.8 °C) 64 18 107/61 92 %   02/18/22 0724 -- -- -- -- 97 %   02/18/22 0615 98.1 °F (36.7 °C) 75 18 132/64 97 %     Pain level:4 to 8 out of 10  Pain location:right hip  Pain interventions:Medication per order, rest, positioning,relaxation, body mechanics      Patient education:bed mobility training,transfer training, gait training, balance training,fall precautions, body mechanics,activity pacing, energy conservation,WBAT RLE precautions,Patient verbalizing understanding and demonstrating understanding of patient education. Recommend follow up education. Interdisciplinary Communication:spoke with RN regarding pain medication schedule    Other (comment) (respiratory and hip)  GROSS ASSESSMENT Daily Assessment     NA       COGNITION Daily Assessment    Alert, able to follow commands,cooperating,participating, motivated, anxious regarding pain in RLE         BED/MAT MOBILITY Daily Assessment   Increased time and effort to complete with cues for body mechanics       Rolling Right : 0 (Not tested)  Rolling Left : 0 (Not tested)  Supine to Sit : 5 (Stand-by assistance)  Sit to Supine : 4 (Minimal assistance) (with RLE)       TRANSFERS Daily Assessment   Increased time and effort to complete with cues for body mechanics     Transfer Type: SPT with walker  Other: SPT with RW to [facility] and BSC to recliner with SBA and cues.  Independent with hygiene and clothing management during toileting   Transfer Assistance : 5 (Stand-by assistance)  Sit to Stand Assistance: Stand-by assistance  Car Transfers: Not tested       GAIT Daily Assessment    Amount of Assistance: 5 (Stand-by assistance)  Distance (ft): 30 Feet (ft)  Assistive Device: Walker, rolling   Gait training with RW demonstrating slow start/stop step to antalgic gait pattern with decreased stance phase  RLE, decreased step length, decreased step clearance and flexed posture. Verbal and visual cues to correct gait deviations. No loss of balance      STEPS or STAIRS Daily Assessment    Steps/Stairs Ambulated (#): 0  Level of Assist : 0 (Not tested)       BALANCE Daily Assessment   Static standing without a device while managing clothing during toileting. No loss of balance. Able to manage lower body clothing with cues. CGA to SBA with gait balance and SPT Sitting - Static: Good (unsupported)  Sitting - Dynamic: Good (unsupported)  Standing - Static: Fair (with RW)  Standing - Dynamic : Impaired       WHEELCHAIR MOBILITY Daily Assessment    Curbs/Ramps Assist Required (FIM Score): 0 (Not tested)  Wheelchair Setup Assist Required : 0 (Not tested)       LOWER EXTREMITY EXERCISES Daily Assessment   Increased time and effort to complete with multiple and frequent rest breaks. Cues for correct form   Extremity: Both  Exercise Type #1: Seated lower extremity strengthening  Sets Performed: 3  Reps Performed: 10  Level of Assist: Minimal assistance     SEATED EXERCISES Sets Reps Comments   Ankle Pumps 3 10    Long Arc Quads 3 10    Hip Adduction/Ball Squeeze 3 10    Hip Abduction with yellow Theraband 1 10    Hamstring Curls with yellow Theraband 3 10               Assessment: Gait distance and functional endurance improving slowly. Improved scheduling of pain medication prior to PT treatment with improved tolerance to treatment       Patient remained in  room at end of treatment and remained up in recliner with LEs elevated and with needs in reach. 02 at 1 lpm    Plan of Care: Continue with POC and progress as tolerated. Laura Springer, PT  2/18/2022

## 2022-02-18 NOTE — PROGRESS NOTES
[facility] Physical Therapy: Attempted to see patient for scheduled treatment time. Patient reporting onset of diarrhea during OT treatment. Reports she is now having nausea and feels cold and shaky. Reports she is afraid to participate with PT due to diarrhea. Requesting to defer treatment. Treatment deferred per patient request. Attempt to resume treatment tomorrow.   Mandi Sprague, PT  2/17/2022

## 2022-02-18 NOTE — PROGRESS NOTES
PHYSICAL THERAPY DAILY NOTE  Time In: 4777  Time Out: 1761  Patient Seen For: PM;Gait training;Patient education; Therapeutic exercise;Transfer training; Other (see progress notes)    Subjective: Patient reporting the exercises are getting a little easier. Reports she does not think she can walk that far         Objective:Vital Signs:02 at 1.5 lpm.   Patient Vitals for the past 12 hrs:   Temp Pulse Resp BP SpO2   02/18/22 1612 97.6 °F (36.4 °C) 66 16 119/61 93 %   02/18/22 1237 -- 74 18 (!) 127/58 97 %   02/18/22 0745 98.3 °F (36.8 °C) 64 18 107/61 92 %   02/18/22 0724 -- -- -- -- 97 %   02/18/22 0615 98.1 °F (36.7 °C) 75 18 132/64 97 %     Pain level:4 to 8 out of 10  Pain location:right hip  Pain interventions:Medication per order, rest, positioning,relaxation, body mechanics      Patient education:Bed mobility training,transfer training, gait training, balance training,fall precautions, body mechanics,activity pacing, energy conservation,WBAT RLE precautions,Patient verbalizing understanding and demonstrating understanding of patient education. Recommend follow up education.     Interdisciplinary Communication:spoke with RN regarding pain medication schedule    Other (comment) (respiratory and hip)  GROSS ASSESSMENT Daily Assessment     NA       COGNITION Daily Assessment    Alert, able to follow commands,cooperating,participating, motivated, anxious regarding pain in RLE         BED/MAT MOBILITY Daily Assessment   Increased time and effort to complete with cues for body mechanics     Rolling Right : 0 (Not tested)  Rolling Left : 0 (Not tested)  Supine to Sit : 5 (Stand-by assistance)  Sit to Supine : 4 (Minimal assistance) (with RLE)       TRANSFERS Daily Assessment   Increased time and effort to complete with cues for body mechanics     Transfer Type: SPT with walker  Transfer Assistance : 5 (Stand-by assistance)  Sit to Stand Assistance: Stand-by assistance  Car Transfers: Not tested       GAIT Daily Assessment Amount of Assistance: 5 (Stand-by assistance)  Distance (ft): 40 Feet (ft)  Assistive Device: Walker, rolling   Gait training with RW demonstrating slow start/stop step to antalgic gait pattern with decreased stance phase  RLE, decreased step length, decreased step clearance and flexed posture. Verbal and visual cues to correct gait deviations. No loss of balance      STEPS or STAIRS Daily Assessment    Steps/Stairs Ambulated (#): 0  Level of Assist : 0 (Not tested)       BALANCE Daily Assessment   No loss of balance during gait training or transfer training Sitting - Static: Good (unsupported)  Sitting - Dynamic: Good (unsupported)  Standing - Static: Fair (wi RW)  Standing - Dynamic : Impaired       WHEELCHAIR MOBILITY Daily Assessment    Curbs/Ramps Assist Required (FIM Score): 0 (Not tested)  Wheelchair Setup Assist Required : 0 (Not tested)       LOWER EXTREMITY EXERCISES Daily Assessment   Increased time and effort to complete with multiple and frequent rest breaks. Cues for correct form   Extremity: Right  Exercise Type #1: Supine lower extremity strengthening  Sets Performed: 3  Reps Performed: 10  Level of Assist: Minimal assistance     SUPINE EXERCISES Sets Reps Comments   Ankle Pumps 3 10    Quad Sets 3 10    Glut Sets 3 10    Heel Slides 3 10    Hip Abduction 3 10    Short Arc Quad 3 10             Assessment: Gait distance improved this PM with improved functional endurance. Tolerance to supine LE exercises improving       Patient remained in  room at end of treatment and remained up in recliner with LEs elevated and with needs in reach. Plan of Care: Continue with POC and progress as tolerated.      Laura Springer, PT  2/18/2022

## 2022-02-18 NOTE — PROGRESS NOTES
Ismael Camargo MD  Medical Director  [address] [facility], [address] [facility]  Tel: [phone] [facility] PROGRESS NOTE    [facility]  Admit Date: 2/10/2022  Admit Diagnosis:   Femur fracture, right ([address] [S72.91XA]    Subjective     Patient seen and examined before AM therapies, just now eating breakfast, as she was dozing when tray delivered. Diarrhea has resolved. Pleased that B LE edema is much less after diuretic + compression stockings. No medical complaints.     Objective:     Current Facility-Administered Medications   Medication Dose Route Frequency    torsemide (DEMADEX) tablet 10 mg  10 mg Oral DAILY    loperamide (IMODIUM) capsule 4 mg  4 mg Oral QID PRN    budesonide-formoterol (SYMBICORT) 80-4.5 mcg inhaler  2 Puff Inhalation BID RT    escitalopram oxalate (LEXAPRO) tablet 10 mg  10 mg Oral DAILY    amLODIPine (NORVASC) tablet 7.5 mg  7.5 mg Oral DAILY    oxyCODONE IR (ROXICODONE) tablet 5-10 mg  5-10 mg Oral Q4H PRN    albuterol (PROVENTIL VENTOLIN) nebulizer solution 2.5 mg  2.5 mg Nebulization BID RT    traMADoL (ULTRAM) tablet 50 mg  50 mg Oral Q6H PRN    nystatin (MYCOSTATIN) 100,000 unit/mL oral suspension 500,000 Units  500,000 Units Oral QID    0.9% sodium chloride infusion 250 mL  250 mL IntraVENous PRN    acetaminophen (TYLENOL) tablet 650 mg  650 mg Oral Q8H    senna-docusate (PERICOLACE) 8.6-50 mg per tablet 2 Tablet  2 Tablet Oral DAILY    sodium chloride (NS) flush 5-40 mL  5-40 mL IntraVENous PRN    alcohol 62% (NOZIN) nasal  1 Ampule  1 Ampule Topical Q12H    albuterol (PROVENTIL HFA, VENTOLIN HFA, PROAIR HFA) inhaler 2 Puff  2 Puff Inhalation Q6H PRN    albuterol-ipratropium (DUO-NEB) 2.5 MG-0.5 MG/3 ML  3 mL Nebulization Q6H PRN    aspirin delayed-release tablet 81 mg  81 mg Oral DAILY    atorvastatin (LIPITOR) tablet 80 mg  80 mg Oral QHS    bisacodyL (DULCOLAX) suppository 10 mg  10 mg Rectal DAILY PRN    guaiFENesin ER (MUCINEX) tablet 1,200 mg  1,200 mg Oral Q12H    lidocaine 4 % patch 1 Patch  1 Patch TransDERmal Q24H    LORazepam (ATIVAN) tablet 0.5 mg  0.5 mg Oral Q4H PRN    methIMAzole (TAPAZOLE) tablet 10 mg  10 mg Oral DAILY    metoprolol tartrate (LOPRESSOR) tablet 50 mg  50 mg Oral Q6H    nitroglycerin (NITROSTAT) tablet 0.4 mg  0.4 mg SubLINGual PRN    ondansetron (ZOFRAN ODT) tablet 4 mg  4 mg Oral Q6H PRN    pantoprazole (PROTONIX) tablet 40 mg  40 mg Oral BID    polyethylene glycol (MIRALAX) packet 17 g  17 g Oral DAILY    prasugreL (EFFIENT) tablet 10 mg  10 mg Oral DAILY    predniSONE (DELTASONE) tablet 40 mg  40 mg Oral DAILY WITH LUNCH    senna-docusate (PERICOLACE) 8.6-50 mg per tablet 1 Tablet  1 Tablet Oral BID PRN    tiotropium bromide (SPIRIVA RESPIMAT) 2.5 mcg /actuation  2 Puff Inhalation DAILY    traZODone (DESYREL) tablet 25 mg  25 mg Oral QHS PRN       Review of Systems:   Denies chest pain, shortness of breath, cough, headache, visual problems, abdominal pain, dysuria, calf pain. Pertinent positives are as noted in the HPI, ROS unremarkable otherwise. Visit Vitals  /61   Pulse 64   Temp 98.3 °F (36.8 °C)   Resp 18   SpO2 92%        Physical Exam:   General: Alert, appropriately oriented. Sitting up in bed with breakfast.   HEENT: Normocephalic, EOM intact. Oral mucosa moist.   Lungs: Decreased breath sounds at bases, otherwise clear to auscultation. Respirations even and unlabored on 1L NC, prolonged expiration. Heart: Regular rate and rhythm, normal S1, S2. No appreciable murmur. Abdomen: Soft, non-tender, not distended. Bowel sounds normoactive, no organomegaly. Genitourinary: Deferred. Neuromuscular:      UE strength and ROM appear full and unencumbered.   Unable to lift R LE off bed against gravity due to pain and weakness, strength improved distally; L LE weak proximally, distal strength intact. Sensation intact B LE. Skin/extremity: No rashes, no erythema. Calves soft, non-tender B LE. Trace right pedal edema, none on left. Functional Assessment:  Balance  Sitting - Static: Good (unsupported) (02/17/22 1200)  Sitting - Dynamic: Good (unsupported) (02/17/22 1200)  Standing - Static: Fair (with RW) (02/17/22 1200)  Standing - Dynamic : Impaired (02/17/22 1200)       Salma Kaur Fall Risk Assessment:  Salma Kaur Fall Risk  Mobility: Ambulates or transfers with assist devices or assistance (02/18/22 0736)  Mobility Interventions: Patient to call before getting OOB;Utilize walker, cane, or other assistive device (02/17/22 0854)  Mentation: Alert, oriented x 3 (02/17/22 2020)  Medication: Patient receiving anticonvulsants, sedatives(tranquilizers), psychotropics or hypnotics, hypoglycemics, narcotics, sleep aids, antihypertensives, laxatives, or diuretics (02/17/22 2020)  Medication Interventions: Patient to call before getting OOB; Teach patient to arise slowly (02/17/22 2100)  Elimination: Needs assistance with toileting (02/17/22 2020)  Elimination Interventions: Call light in reach; Patient to call for help with toileting needs; Toileting schedule/hourly rounds (02/17/22 0854)  Prior Fall History: Before admission in past 12 months _home or previous inpatient care) (02/17/22 2020)  History of Falls Interventions: Consult care management for discharge planning;Door open when patient unattended (02/17/22 0854)  Total Score: 4 (02/17/22 2020)  Standard Fall Precautions: Yes (02/16/22 0844)  High Fall Risk: Yes (02/17/22 2020)     Speech Assessment:  Aspiration Signs/Symptoms: None (02/14/22 1330)      Ambulation:  Gait  Distance (ft): 12 Feet (ft) (02/17/22 1200)  Assistive Device: Walker, rolling (02/17/22 1200)     Labs/Studies:  Recent Results (from the past 72 hour(s))   EKG, 12 LEAD, INITIAL    Collection Time: 02/15/22 11:02 AM   Result Value Ref Range    Ventricular Rate 75 BPM    Atrial Rate 75 BPM    P-R Interval 158 ms    QRS Duration 78 ms    Q-T Interval 408 ms    QTC Calculation (Bezet) 455 ms    Calculated P Axis 79 degrees    Calculated R Axis 47 degrees    Calculated T Axis 122 degrees    Diagnosis       Normal sinus rhythm  Nonspecific T wave abnormality  Abnormal ECG  Confirmed by ST NEIL MAYES MD (), JULIA HINTON ([id number]) on 2/15/2022 12:14:27 PM         Assessment:     Problem List as of 2/18/2022 Date Reviewed: 2/14/2022          Codes Class Noted - Resolved    Chest pain ICD-10-CM: R07.9  ICD-9-CM: 786.50  5/31/2019 - Present        EKG, abnormal ICD-10-CM: R94.31  ICD-9-CM: 794.31  2/15/2022 - Present        * (Principal) Femur fracture, right ([facility] ICD-10-CM: L18.10NN  ICD-9-CM: 821.00  2/11/2022 - Present        COPD with acute lower respiratory infection ([facility] ICD-10-CM: J44.0  ICD-9-CM: 496, 519.8  2/10/2022 - Present        Moderate to severe mitral regurgitation ICD-10-CM: I34.0  ICD-9-CM: 424.0  2/8/2022 - Present        Iron deficiency anemia due to chronic blood loss (Chronic) ICD-10-CM: D50.0  ICD-9-CM: 280.0  2/8/2022 - Present        Closed right hip fracture ([facility] ICD-10-CM: S72.001A  ICD-9-CM: 820.8  2/4/2022 - Present        Other fracture of right femur, initial encounter for closed fracture ([facility] ICD-10-CM: N61.9B8K  ICD-9-CM: 821.00  2/4/2022 - Present        DNR (do not resuscitate) ICD-10-CM: Z66  ICD-9-CM: V49.86  11/9/2021 - Present    Overview Signed 11/9/2021 12:25 PM by Verner Plum, NP     POST form completed - DNR but full treatment, including intubation. No TRACH. No PEG.               Acute bilateral low back pain without sciatica ICD-10-CM: M54.50  ICD-9-CM: 724.2, 338.19  11/9/2021 - Present        Palliative care patient ICD-10-CM: Z51.5  ICD-9-CM: V66.7  7/14/2021 - Present        Ischemic heart disease, hx pci, cath/pci last 5/2019 ICD-10-CM: I25.9  ICD-9-CM: 414.9  2/24/2021 - Present        H/O carotid endarterectomy ICD-10-CM: Z98.890  ICD-9-CM: V45.89 9/21/2020 - Present        Right ear pain ICD-10-CM: H92.01  ICD-9-CM: 388.70  9/21/2020 - Present        Pulmonary mass ICD-10-CM: R91.8  ICD-9-CM: 786.6  2/11/2020 - Present        Centrilobular emphysema ([address] ICD-10-CM: J43.2  ICD-9-CM: 492.8  12/20/2019 - Present        Carotid stenosis ICD-10-CM: I65.29  ICD-9-CM: 433.10  12/3/2019 - Present        Carotid stenosis, right ICD-10-CM: I65.21  ICD-9-CM: 433.10  12/3/2019 - Present        Hypertensive urgency ICD-10-CM: I16.0  ICD-9-CM: 401.9  6/8/2019 - Present        Chronic respiratory failure with hypoxia (HCC) (Chronic) ICD-10-CM: J96.11  ICD-9-CM: 518.83, 799.02  6/8/2019 - Present        CAD S/P percutaneous coronary angioplasty ICD-10-CM: I25.10, Z98.61  ICD-9-CM: 414.01, V45.82  5/31/2019 - Present        Ventricular ectopy ICD-10-CM: I49.3  ICD-9-CM: 427.69  5/9/2017 - Present        Chronic anxiety ICD-10-CM: F41.9  ICD-9-CM: 300.00  4/27/2017 - Present        Hyperlipidemia (Chronic) ICD-10-CM: E78.5  ICD-9-CM: 272.4  10/10/2016 - Present        Essential hypertension, benign (Chronic) ICD-10-CM: I10  ICD-9-CM: 401.1  10/10/2016 - Present        Coronary artery disease involving native coronary artery of native heart without angina pectoris ICD-10-CM: I25.10  ICD-9-CM: 414.01  10/10/2016 - Present        Hypoxemia ICD-10-CM: R09.02  ICD-9-CM: 799.02  8/25/2013 - Present    Overview Signed 2/5/2014  9:08 AM by Oniel Bravo NP     FILIBERTO 8/2013. Pt had over 2 hours 1/2 hours of desaturations at night. O2 was ordered for her to start at 2L in September, but she stated she was feeling better than when FILIBERTO was done and refused O2. FILIBERTO 12/2013: not performed secondary to patient factors.               Acute respiratory failure with hypoxia [facility]) ICD-10-CM: J96.01  ICD-9-CM: 518.81  8/22/2013 - Present        COPD, very severe (HCC) (Chronic) ICD-10-CM: J44.9  ICD-9-CM: 496  8/20/2013 - Present        Personal history of tobacco use ICD-10-CM: I62.423  ICD-9-CM: V15.82  8/19/2013 - Present        History of MI (myocardial infarction) ICD-10-CM: I25.2  ICD-9-CM: 111  8/19/2013 - Present    Overview Addendum 8/30/2018  9:20 AM by Elias Gallegos MD     STEMI 8/19/13:  Angioplasty for in-stent stenosis to LAD             RESOLVED: Chronic obstructive pulmonary disease ([facility] ICD-10-CM: J44.9  ICD-9-CM: 496  10/10/2016 - 8/30/2018        RESOLVED: Acute systolic heart failure ([facility] ICD-10-CM: I50.21  ICD-9-CM: 428.21  8/22/2013 - 8/30/2018        RESOLVED: Pulmonary hemorrhage ICD-10-CM: R04.89  ICD-9-CM: 786.30  8/20/2013 - 6/25/2019        RESOLVED: CAD (coronary artery disease) (Chronic) ICD-10-CM: I25.10  ICD-9-CM: 414.00  8/19/2013 - 8/3/2021        RESOLVED: COPD (chronic obstructive pulmonary disease) (HCC) (Chronic) ICD-10-CM: J44.9  ICD-9-CM: 496  8/19/2013 - 8/20/2013            Right IT fracture s/p Gamma nail fixation (2/4/2022, Samreen Rebolledo MD // Radha Stokes post-op acute hypoxic respiratory failure, CAD s/p cardiac catheterization and stent placement     Plan / Recommendations / Medical Decision Making:      Daily physician / PA medical management:     Right hip fracture - PT / OT // WBAT; dyspnea is barrier to progress. Poor endurance.     Hyperthyroidism - thyroid profile checked 2/7, found to be hyperthyroid. Started on methimazole.     CAD with acute chest pain - cardiac catheterization 2/7/2022, she is s/p PCI to LAD diagonal and to distal RCA. Severe MR; follow up with Cardiology.  Continue Effient.  -2/11 remote telemetry x 72hrs; if no chest pain and no events, will d/c telemetry  -2/12 chest pain this AM, tele monitor did not  a change ? indigestion ? Ranexa?  -2/14 no chest pain this AM, recognizes CP likely anxiety-related, no events on telemetry --> d/c telemetry  -2/15 CP did not appear cardiac in nature; prior to episode, she was having indigestion and wanted something other than what pharmacy provided; on Protonix BID already; ECG stable at 11 am, earlier patient was hypertensive, anxious; artifact on AM ECG  -2/17 no further CP     Chronic anemia with ABLA - Hgb 8.8< 9.2>8.9>7.1; stable; iron studies wnl.  -2/14 Hgb 8.9, low but stable     COPD, severe - continue PO steroids (40 mg x 3d, then 20mg daily x 3d, then 10mg x 2d then stop) and O2 supplementation; continue nebs; continue Mucinex  -2/11 on 1.5L O2 this AM, sats 96% at rest  -2/16 stable; insistent on having Symbicort; decrease prednisone to 20 mg; was not weaning as planned   -2/18 wean not yet initiated; decrease prednisone to 30mg at lunch x 3d then 20mg x 3d, etc,. starting today     Hypertension - BP fluctuating, managed medically. Hypotensive earlier today; nitro past and ACE inhibitor discontinued.   -2/12 /76 - 151/75, consider low dose ACE inhibitor  -2/14 normotensive x 48h, no changes  -2/15 /103, patient anxious at time; repeated 110/70  -2/16 /81 with anxiety; on Norvasc 5mg, Lopressor 50mg q6h; increase Norvasc to 7.5mg  -2/17 BP poorly controlled, Dr. Sam Miller following; consider increasing Norvasc to 10mg daily; patient reports being on 10mg Demadex at home due to her edema; will restart  -2/18 /61 this AM, much better BP controlled with diuretic on board     Hyperlipidemia - continue Zetia / Lipitor 80mg daily.     Pain management - will require regular pain assessment and comprehensive pain management. Has Tylenol and oxycodone PRN.    Pneumonia prophylaxis - incentive spirometer every hour while awake. On Levaquin until 2/13; CXR 2/9 without acute findings.      DVT risk / DVT prophylaxis - daily physician / PA exam to assess as patient is at increased risk for of thromboembolism. Mobilize as tolerated. Sequential pneumatic compression devices (SCDs) when in bed; thigh-high or knee-high thromboembolic deterrent hose when out of bed. On Effient.     GI prophylaxis - resume PPI.  At times may need additional antacids, Maalox prn.     Depression - ? Pt with anxiety that is apparent. PRN benzodiazepines.  - pt is very realistic about her health and decline. She speaks calmly about having her  arrangements made and that it \"is what it is. \" Very much at peace. Encouraged her to participate and allow self to heal in the best way possible  - anxiety overshadowing her health overall, open to trying antidepressant / anxiolytic, has used Lexapro in past but does not recall +/- effects; revisit tomorrow  - agreeable to Lexapro 10mg daily     General skin care / wound prevention - monitor right incision and general skin wound status daily per staff and physician / PA. At risk for failure due to impaired mobility  - POD #7 incision c/d/i  - d/c staples      Bladder program / urinary retention - schedule voids q 6-8 hrs. Check post-void residual every shift and as needed; in-and-out catheter if post-void residual is more than 400ml.     Bowel program - at risk for constipation as a side effect of opioids, other medications, impaired mobility, etc. MiraLAX daily for regularity, Marisela-Colace for stool softener. PRN MOM, bisacodyl suppository or tablets for constipation. Time spent was 25 minutes with over 1/2 in direct patient care/examination, consultation and coordination of care. Signed By: Sara Zaragoza PA-C    2022      Physician Assistant with [facility]  Anh Sinclair MD, Medical Director

## 2022-02-18 NOTE — PROGRESS NOTES
Chart reviewed. No new needs noted. Pt continues to be anxious about d/c home with out daughter. Will follow up next week to assure plan remain the same. CM to continue to follow and monitor for any further needs.

## 2022-02-18 NOTE — PROGRESS NOTES
Time In 0833   Time Out 1003     Mobility   Score Comments   Supine to Sit 6: Independent I   Sit to Stand 4: Supervision or touching A S   Transfer Assist 4: Supervision or touching A Transfer Type: SPT   Equipment: Rolling Walker   Comments: S for safety     Activities of Daily Living    Score Comments   Eating 6: Independent I   Oral Hygiene 6: Independent I   Bathing 4: Supervision or touching A Type of Shower: Bath Pack  Position: Standing PRN and Unsupported Sitting   Adaptive  Equipment: N/A  Comments: Pt only washed upper half; cueing to wash entire body   Upper Body  Dressing 5: S/U or clean-up assist Items Applied: Pullover  Position: Unsupported Sitting  Comments: S/U   Lower Body Dressing 4: Supervision or touching A Items Applied: Underwear and Elastic pants  Position: Standing PRN and Unsupported Sitting  Adaptive Equipment: RW  Comments: S for safety   Donning/[place] Footwear 4: Supervision or touching A Items Applied: Socks and Shoes with fasteners   Adaptive Equipment: N/A  Comments: Cueing for strategies    Education  Improvements made     Pt was in bed and agreeable to tx. Pt's performance with ADL is reflected in above chart. Pt stood for 16 minutes while putting a puzzle together. Pt walked approximately 36' with RW and S. Pt reported dizziness in standing. BP was 145/54 when taken with pt reporting resolving of the symptoms. Pt was left in room with all needs within reach.      Wes Rogers OT   2/18/2022

## 2022-02-19 PROCEDURE — 74011000250 HC RX REV CODE- 250: Performed by: PHYSICAL MEDICINE & REHABILITATION

## 2022-02-19 PROCEDURE — 97530 THERAPEUTIC ACTIVITIES: CPT

## 2022-02-19 PROCEDURE — 77010033678 HC OXYGEN DAILY

## 2022-02-19 PROCEDURE — 2709999900 HC NON-CHARGEABLE SUPPLY

## 2022-02-19 PROCEDURE — 74011250637 HC RX REV CODE- 250/637: Performed by: PHYSICAL MEDICINE & REHABILITATION

## 2022-02-19 PROCEDURE — 74011250637 HC RX REV CODE- 250/637: Performed by: PHYSICIAN ASSISTANT

## 2022-02-19 PROCEDURE — 65310000000 HC RM PRIVATE REHAB

## 2022-02-19 PROCEDURE — 97535 SELF CARE MNGMENT TRAINING: CPT

## 2022-02-19 PROCEDURE — 94760 N-INVAS EAR/PLS OXIMETRY 1: CPT

## 2022-02-19 PROCEDURE — 74011636637 HC RX REV CODE- 636/637: Performed by: PHYSICIAN ASSISTANT

## 2022-02-19 PROCEDURE — 99231 SBSQ HOSP IP/OBS SF/LOW 25: CPT | Performed by: PHYSICAL MEDICINE & REHABILITATION

## 2022-02-19 PROCEDURE — 97116 GAIT TRAINING THERAPY: CPT

## 2022-02-19 PROCEDURE — 97110 THERAPEUTIC EXERCISES: CPT

## 2022-02-19 PROCEDURE — 94640 AIRWAY INHALATION TREATMENT: CPT

## 2022-02-19 RX ADMIN — ALBUTEROL SULFATE 2 PUFF: 90 AEROSOL, METERED RESPIRATORY (INHALATION) at 08:13

## 2022-02-19 RX ADMIN — ASPIRIN 81 MG: 81 TABLET ORAL at 08:15

## 2022-02-19 RX ADMIN — BUDESONIDE AND FORMOTEROL FUMARATE DIHYDRATE 2 PUFF: 80; 4.5 AEROSOL RESPIRATORY (INHALATION) at 21:45

## 2022-02-19 RX ADMIN — ESCITALOPRAM OXALATE 10 MG: 10 TABLET, FILM COATED ORAL at 08:15

## 2022-02-19 RX ADMIN — ALBUTEROL SULFATE 2.5 MG: 2.5 SOLUTION RESPIRATORY (INHALATION) at 21:45

## 2022-02-19 RX ADMIN — GUAIFENESIN 1200 MG: 600 TABLET ORAL at 08:16

## 2022-02-19 RX ADMIN — PANTOPRAZOLE SODIUM 40 MG: 40 TABLET, DELAYED RELEASE ORAL at 17:13

## 2022-02-19 RX ADMIN — ACETAMINOPHEN 650 MG: 325 TABLET ORAL at 14:10

## 2022-02-19 RX ADMIN — ACETAMINOPHEN 650 MG: 325 TABLET ORAL at 21:59

## 2022-02-19 RX ADMIN — PRASUGREL 10 MG: 10 TABLET, FILM COATED ORAL at 09:00

## 2022-02-19 RX ADMIN — METOPROLOL TARTRATE 50 MG: 50 TABLET, FILM COATED ORAL at 17:13

## 2022-02-19 RX ADMIN — TRAMADOL HYDROCHLORIDE 50 MG: 50 TABLET, COATED ORAL at 13:35

## 2022-02-19 RX ADMIN — METHIMAZOLE 10 MG: 5 TABLET ORAL at 08:15

## 2022-02-19 RX ADMIN — TORSEMIDE 10 MG: 20 TABLET ORAL at 08:16

## 2022-02-19 RX ADMIN — BUDESONIDE AND FORMOTEROL FUMARATE DIHYDRATE 2 PUFF: 80; 4.5 AEROSOL RESPIRATORY (INHALATION) at 08:33

## 2022-02-19 RX ADMIN — OXYCODONE HYDROCHLORIDE 5 MG: 5 TABLET ORAL at 09:57

## 2022-02-19 RX ADMIN — PREDNISONE 30 MG: 20 TABLET ORAL at 12:27

## 2022-02-19 RX ADMIN — METOPROLOL TARTRATE 50 MG: 50 TABLET, FILM COATED ORAL at 12:27

## 2022-02-19 RX ADMIN — METOPROLOL TARTRATE 50 MG: 50 TABLET, FILM COATED ORAL at 22:12

## 2022-02-19 RX ADMIN — ACETAMINOPHEN 650 MG: 325 TABLET ORAL at 06:04

## 2022-02-19 RX ADMIN — ALBUTEROL SULFATE 2.5 MG: 2.5 SOLUTION RESPIRATORY (INHALATION) at 08:00

## 2022-02-19 RX ADMIN — LORAZEPAM 0.5 MG: 0.5 TABLET ORAL at 21:59

## 2022-02-19 RX ADMIN — AMLODIPINE BESYLATE 7.5 MG: 5 TABLET ORAL at 08:15

## 2022-02-19 RX ADMIN — PANTOPRAZOLE SODIUM 40 MG: 40 TABLET, DELAYED RELEASE ORAL at 08:16

## 2022-02-19 RX ADMIN — METOPROLOL TARTRATE 50 MG: 50 TABLET, FILM COATED ORAL at 06:04

## 2022-02-19 RX ADMIN — NYSTATIN 500000 UNITS: 100000 SUSPENSION ORAL at 22:11

## 2022-02-19 RX ADMIN — ATORVASTATIN CALCIUM 80 MG: 80 TABLET, FILM COATED ORAL at 21:59

## 2022-02-19 RX ADMIN — GUAIFENESIN 1200 MG: 600 TABLET ORAL at 21:59

## 2022-02-19 RX ADMIN — SENNOSIDES AND DOCUSATE SODIUM 2 TABLET: 8.6; 5 TABLET ORAL at 08:15

## 2022-02-19 RX ADMIN — TIOTROPIUM BROMIDE INHALATION SPRAY 2 PUFF: 3.12 SPRAY, METERED RESPIRATORY (INHALATION) at 08:34

## 2022-02-19 RX ADMIN — OXYCODONE HYDROCHLORIDE 5 MG: 5 TABLET ORAL at 22:00

## 2022-02-19 NOTE — PROGRESS NOTES
02/19/22 1300   Time Spent With Patient   Time In 0816   Time Out 0856   Patient Seen For: AM;ADLs   OT Daily Note  Subjective: I am not going to take a shower. I will not go to the bathroom but use a bedside chair   Pain: none reported  Interdisciplinary Communication: discuss with PT use of bedside commode  Precautions: Other (comment) (respiratory and hip)  Location on arrival: patient sitting edge of bed. Self-Care   Patient refused to take a shower and refused to go to the bathroom using toilet in the bathroom instead of BSC. Patient transferred to [facility] with minimal assist and able to clean self after urine output,    Patient was able to wash self and dress self with CGA for most items including LE pants       Assessment: Patient stated that she only had four more days to be \"Babied\" so she was going to take advantage of it. See above   Plan: Continue OT POC with focus on ADL/IADL skills, functional transfers, functional mobility, coordination, strength, static and dynamic balance, and activity tolerance to maximize safety and independence with ADLs and functional transfers. Patient was left in bed with call light/all needs in reach and in no distress.      Tremayne Phillips  2/19/2022

## 2022-02-19 NOTE — PROGRESS NOTES
Milka Barney MD  Medical Director  [address] [facility], [address] [facility]  Tel: [phone] PROGRESS NOTE    [facility]  Admit Date: 2/10/2022  Admit Diagnosis:   Femur fracture, right ([address] [S72.91XA]    Subjective     Patient seen and examined before AM therapies, just now eating breakfast, as she was dozing when tray delivered. Diarrhea has resolved. Pleased that B LE edema is much less after diuretic + compression stockings. No medical complaints. Patient seen and examined. R hip incision healing with steri-strips. No pain rest. Decreased LE edema. Denies SOB, lightheadedness or nausea. Participating in therapy.     Objective:     Current Facility-Administered Medications   Medication Dose Route Frequency    predniSONE (DELTASONE) tablet 30 mg  30 mg Oral DAILY WITH LUNCH    torsemide (DEMADEX) tablet 10 mg  10 mg Oral DAILY    loperamide (IMODIUM) capsule 4 mg  4 mg Oral QID PRN    budesonide-formoterol (SYMBICORT) 80-4.5 mcg inhaler  2 Puff Inhalation BID RT    escitalopram oxalate (LEXAPRO) tablet 10 mg  10 mg Oral DAILY    amLODIPine (NORVASC) tablet 7.5 mg  7.5 mg Oral DAILY    oxyCODONE IR (ROXICODONE) tablet 5-10 mg  5-10 mg Oral Q4H PRN    albuterol (PROVENTIL VENTOLIN) nebulizer solution 2.5 mg  2.5 mg Nebulization BID RT    traMADoL (ULTRAM) tablet 50 mg  50 mg Oral Q6H PRN    nystatin (MYCOSTATIN) 100,000 unit/mL oral suspension 500,000 Units  500,000 Units Oral QID    0.9% sodium chloride infusion 250 mL  250 mL IntraVENous PRN    acetaminophen (TYLENOL) tablet 650 mg  650 mg Oral Q8H    senna-docusate (PERICOLACE) 8.6-50 mg per tablet 2 Tablet  2 Tablet Oral DAILY    sodium chloride (NS) flush 5-40 mL  5-40 mL IntraVENous PRN    alcohol 62% (NOZIN) nasal  1 Ampule  1 Ampule Topical Q12H    albuterol (PROVENTIL HFA, VENTOLIN HFA, PROAIR HFA) inhaler 2 Puff  2 Puff Inhalation Q6H PRN    albuterol-ipratropium (DUO-NEB) 2.5 MG-0.5 MG/3 ML  3 mL Nebulization Q6H PRN    aspirin delayed-release tablet 81 mg  81 mg Oral DAILY    atorvastatin (LIPITOR) tablet 80 mg  80 mg Oral QHS    bisacodyL (DULCOLAX) suppository 10 mg  10 mg Rectal DAILY PRN    guaiFENesin ER (MUCINEX) tablet 1,200 mg  1,200 mg Oral Q12H    lidocaine 4 % patch 1 Patch  1 Patch TransDERmal Q24H    LORazepam (ATIVAN) tablet 0.5 mg  0.5 mg Oral Q4H PRN    methIMAzole (TAPAZOLE) tablet 10 mg  10 mg Oral DAILY    metoprolol tartrate (LOPRESSOR) tablet 50 mg  50 mg Oral Q6H    nitroglycerin (NITROSTAT) tablet 0.4 mg  0.4 mg SubLINGual PRN    ondansetron (ZOFRAN ODT) tablet 4 mg  4 mg Oral Q6H PRN    pantoprazole (PROTONIX) tablet 40 mg  40 mg Oral BID    polyethylene glycol (MIRALAX) packet 17 g  17 g Oral DAILY    prasugreL (EFFIENT) tablet 10 mg  10 mg Oral DAILY    senna-docusate (PERICOLACE) 8.6-50 mg per tablet 1 Tablet  1 Tablet Oral BID PRN    tiotropium bromide (SPIRIVA RESPIMAT) 2.5 mcg /actuation  2 Puff Inhalation DAILY    traZODone (DESYREL) tablet 25 mg  25 mg Oral QHS PRN       Review of Systems:   Denies chest pain, shortness of breath, cough, headache, visual problems, abdominal pain, dysuria, calf pain. Pertinent positives are as noted in the HPI, ROS unremarkable otherwise. Visit Vitals  /82 (BP 1 Location: Left lower arm)   Pulse 69   Temp 98.3 °F (36.8 °C)   Resp 16   SpO2 97%      Physical Exam:   General: Alert, appropriately oriented. Sitting up in bed with breakfast.   HEENT: Normocephalic, EOM intact. Oral mucosa moist.   Lungs: Decreased breath sounds at bases, otherwise clear to auscultation. Respirations even and unlabored on 1L NC, prolonged expiration. Heart: Regular rate and rhythm, normal S1, S2. No appreciable murmur. Abdomen: Soft, non-tender, not distended. Bowel sounds normoactive, no organomegaly. Genitourinary: Deferred.    Neuromuscular: UE strength and ROM appear full and unencumbered. Unable to lift R LE off bed against gravity due to pain and weakness, strength improved distally; L LE weak proximally, distal strength intact. Sensation intact B LE. Skin/extremity: No rashes, no erythema. Calves soft, non-tender B LE. Trace right pedal edema, none on left. Functional Assessment:  Balance  Sitting - Static: Good (unsupported) (02/18/22 1600)  Sitting - Dynamic: Good (unsupported) (02/18/22 1600)  Standing - Static: Fair (wih RW) (02/18/22 1600)  Standing - Dynamic : Impaired (02/18/22 1600)       Clearence Skates Fall Risk Assessment:  Clearence Skates Fall Risk  Mobility: Ambulates or transfers with assist devices or assistance (02/19/22 6480)  Mobility Interventions: Patient to call before getting OOB;Utilize walker, cane, or other assistive device (02/19/22 0728)  Mentation: Alert, oriented x 3 (02/19/22 0728)  Medication: Patient receiving anticonvulsants, sedatives(tranquilizers), psychotropics or hypnotics, hypoglycemics, narcotics, sleep aids, antihypertensives, laxatives, or diuretics (02/19/22 6882)  Medication Interventions: Evaluate medications/consider consulting pharmacy; Patient to call before getting OOB (02/19/22 6191)  Elimination: Needs assistance with toileting (02/19/22 5416)  Elimination Interventions: Call light in reach; Patient to call for help with toileting needs (02/19/22 0728)  Prior Fall History: Before admission in past 12 months _home or previous inpatient care) (02/19/22 2982)  History of Falls Interventions: Consult care management for discharge planning;Door open when patient unattended;Evaluate medications/consider consulting pharmacy (02/19/22 0728)  Total Score: 4 (02/19/22 0728)  Standard Fall Precautions: Yes (02/18/22 2044)  High Fall Risk: Yes (02/19/22 0728)     Speech Assessment:  Aspiration Signs/Symptoms: None (02/14/22 1330)      Ambulation:  Gait  Distance (ft): 40 Feet (ft) (02/18/22 1600)  Assistive Device: jeevan Marley (02/18/22 1600)     Labs/Studies:  No results found for this or any previous visit (from the past 72 hour(s)). Assessment:     Problem List as of 2/19/2022 Date Reviewed: 2/14/2022          Codes Class Noted - Resolved    Chest pain ICD-10-CM: R07.9  ICD-9-CM: 786.50  5/31/2019 - Present        EKG, abnormal ICD-10-CM: R94.31  ICD-9-CM: 794.31  2/15/2022 - Present        * (Principal) Femur fracture, right ([facility] [address] ICD-10-CM: M73.03MU  ICD-9-CM: 821.00  2/11/2022 - Present        COPD with acute lower respiratory infection ([facility] [address] ICD-10-CM: J44.0  ICD-9-CM: 496, 519.8  2/10/2022 - Present        Moderate to severe mitral regurgitation ICD-10-CM: I34.0  ICD-9-CM: 424.0  2/8/2022 - Present        Iron deficiency anemia due to chronic blood loss (Chronic) ICD-10-CM: D50.0  ICD-9-CM: 280.0  2/8/2022 - Present        Closed right hip fracture ([facility] [address] ICD-10-CM: S72.001A  ICD-9-CM: 820.8  2/4/2022 - Present        Other fracture of right femur, initial encounter for closed fracture ([facility] [address] ICD-10-CM: B83.2W6U  ICD-9-CM: 821.00  2/4/2022 - Present        DNR (do not resuscitate) ICD-10-CM: Z66  ICD-9-CM: V49.86  11/9/2021 - Present    Overview Signed 11/9/2021 12:25 PM by Paty Mortensen NP     POST form completed - DNR but full treatment, including intubation. No TRACH. No PEG.               Acute bilateral low back pain without sciatica ICD-10-CM: M54.50  ICD-9-CM: 724.2, 338.19  11/9/2021 - Present        Palliative care patient ICD-10-CM: Z51.5  ICD-9-CM: V66.7  7/14/2021 - Present        Ischemic heart disease, hx pci, cath/pci last 5/2019 ICD-10-CM: I25.9  ICD-9-CM: 414.9  2/24/2021 - Present        H/O carotid endarterectomy ICD-10-CM: Z98.890  ICD-9-CM: V45.89  9/21/2020 - Present        Right ear pain ICD-10-CM: H92.01  ICD-9-CM: 388.70  9/21/2020 - Present        Pulmonary mass ICD-10-CM: R91.8  ICD-9-CM: 786.6  2/11/2020 - Present        Centrilobular emphysema ([facility] [address] ICD-10-CM: J43.2  ICD-9-CM: 492.8  12/20/2019 - Present        Carotid stenosis ICD-10-CM: I65.29  ICD-9-CM: 433.10  12/3/2019 - Present        Carotid stenosis, right ICD-10-CM: I65.21  ICD-9-CM: 433.10  12/3/2019 - Present        Hypertensive urgency ICD-10-CM: I16.0  ICD-9-CM: 401.9  6/8/2019 - Present        Chronic respiratory failure with hypoxia (HCC) (Chronic) ICD-10-CM: J96.11  ICD-9-CM: 518.83, 799.02  6/8/2019 - Present        CAD S/P percutaneous coronary angioplasty ICD-10-CM: I25.10, Z98.61  ICD-9-CM: 414.01, V45.82  5/31/2019 - Present        Ventricular ectopy ICD-10-CM: I49.3  ICD-9-CM: 427.69  5/9/2017 - Present        Chronic anxiety ICD-10-CM: F41.9  ICD-9-CM: 300.00  4/27/2017 - Present        Hyperlipidemia (Chronic) ICD-10-CM: E78.5  ICD-9-CM: 272.4  10/10/2016 - Present        Essential hypertension, benign (Chronic) ICD-10-CM: I10  ICD-9-CM: 401.1  10/10/2016 - Present        Coronary artery disease involving native coronary artery of native heart without angina pectoris ICD-10-CM: I25.10  ICD-9-CM: 414.01  10/10/2016 - Present        Hypoxemia ICD-10-CM: R09.02  ICD-9-CM: 799.02  8/25/2013 - Present    Overview Signed 2/5/2014  9:08 AM by Veronica Terry NP     FILIBERTO 8/2013. Pt had over 2 hours 1/2 hours of desaturations at night. O2 was ordered for her to start at 2L in September, but she stated she was feeling better than when FILIBERTO was done and refused O2. FILIBERTO 12/2013: not performed secondary to patient factors.               Acute respiratory failure with hypoxia [facility]) ICD-10-CM: J96.01  ICD-9-CM: 518.81  8/22/2013 - Present        COPD, very severe (HCC) (Chronic) ICD-10-CM: J44.9  ICD-9-CM: 496  8/20/2013 - Present        Personal history of tobacco use ICD-10-CM: A81.639  ICD-9-CM: V15.82  8/19/2013 - Present        History of MI (myocardial infarction) ICD-10-CM: I25.2  ICD-9-CM: 412  8/19/2013 - Present    Overview Addendum 8/30/2018  9:20 AM by Anirudh Galindo MD     STEMI 8/19/13:  Angioplasty for in-stent stenosis to LAD             RESOLVED: Chronic obstructive pulmonary disease (HCC) ICD-10-CM: J44.9  ICD-9-CM: 496  10/10/2016 - 8/30/2018        RESOLVED: Acute systolic heart failure ([address] ICD-10-CM: I50.21  ICD-9-CM: 428.21  8/22/2013 - 8/30/2018        RESOLVED: Pulmonary hemorrhage ICD-10-CM: R04.89  ICD-9-CM: 786.30  8/20/2013 - 6/25/2019        RESOLVED: CAD (coronary artery disease) (Chronic) ICD-10-CM: I25.10  ICD-9-CM: 414.00  8/19/2013 - 8/3/2021        RESOLVED: COPD (chronic obstructive pulmonary disease) (HCC) (Chronic) ICD-10-CM: J44.9  ICD-9-CM: 496  8/19/2013 - 8/20/2013            Right IT fracture s/p Gamma nail fixation (2/4/2022, Adalberto Lea MD // Wilfredo Gallagher post-op acute hypoxic respiratory failure, CAD s/p cardiac catheterization and stent placement     Plan / Recommendations / Medical Decision Making:      Daily physician / PA medical management:     Right hip fracture - PT / OT // WBAT; dyspnea is barrier to progress. Poor endurance.     Hyperthyroidism - thyroid profile checked 2/7, found to be hyperthyroid. Started on methimazole.     CAD with acute chest pain - cardiac catheterization 2/7/2022, she is s/p PCI to LAD diagonal and to distal RCA. Severe MR; follow up with Cardiology.  Continue Effient.  -2/11 remote telemetry x 72hrs; if no chest pain and no events, will d/c telemetry  -2/12 chest pain this AM, tele monitor did not  a change ? indigestion ? Ranexa?  -2/14 no chest pain this AM, recognizes CP likely anxiety-related, no events on telemetry --> d/c telemetry  -2/15 CP did not appear cardiac in nature; prior to episode, she was having indigestion and wanted something other than what pharmacy provided; on Protonix BID already; ECG stable at 11 am, earlier patient was hypertensive, anxious; artifact on AM ECG  -2/17 no further CP     Chronic anemia with ABLA - Hgb 8.8< 9.2>8.9>7.1; stable; iron studies wnl.  -2/14 Hgb 8.9, low but stable     COPD, severe - continue PO steroids (40 mg x 3d, then 20mg daily x 3d, then 10mg x 2d then stop) and O2 supplementation; continue nebs; continue Mucinex  - on 1.5L O2 this AM, sats 96% at rest  - stable; insistent on having Symbicort; decrease prednisone to 20 mg; was not weaning as planned   - wean not yet initiated; decrease prednisone to 30mg at lunch x 3d then 20mg x 3d, etc,. starting today     Hypertension - BP fluctuating, managed medically. Hypotensive earlier today; nitro past and ACE inhibitor discontinued.   - /76 - 151/75, consider low dose ACE inhibitor  - normotensive x 48h, no changes  -2/15 /103, patient anxious at time; repeated 110/70  - /81 with anxiety; on Norvasc 5mg, Lopressor 50mg q6h; increase Norvasc to 7.5mg  - BP poorly controlled, Dr. Dre Irving following; consider increasing Norvasc to 10mg daily; patient reports being on 10mg Demadex at home due to her edema; will restart  - /61 this AM, much better BP controlled with diuretic on board  -  HR and BP acceptable, 131/82     Hyperlipidemia - continue Zetia / Lipitor 80mg daily.     Pain management - will require regular pain assessment and comprehensive pain management. Has Tylenol and oxycodone PRN.    Pneumonia prophylaxis - incentive spirometer every hour while awake. On Levaquin until ; CXR  without acute findings.      DVT risk / DVT prophylaxis - daily physician / PA exam to assess as patient is at increased risk for of thromboembolism. Mobilize as tolerated. Sequential pneumatic compression devices (SCDs) when in bed; thigh-high or knee-high thromboembolic deterrent hose when out of bed. On Effient.     GI prophylaxis - resume PPI. At times may need additional antacids, Maalox prn.     Depression - ? Pt with anxiety that is apparent. PRN benzodiazepines.  - pt is very realistic about her health and decline.  She speaks calmly about having her  arrangements made and that it \"is what it is. \" Very much at peace. Encouraged her to participate and allow self to heal in the best way possible  -2/14 anxiety overshadowing her health overall, open to trying antidepressant / anxiolytic, has used Lexapro in past but does not recall +/- effects; revisit tomorrow  -2/16 agreeable to Lexapro 10mg daily     General skin care / wound prevention - monitor right incision and general skin wound status daily per staff and physician / PA. At risk for failure due to impaired mobility  -2/11 POD #7 incision c/d/i  -2/16 d/c staples 2/18     Bladder program / urinary retention - schedule voids q 6-8 hrs. Check post-void residual every shift and as needed; in-and-out catheter if post-void residual is more than 400ml.     Bowel program - at risk for constipation as a side effect of opioids, other medications, impaired mobility, etc. MiraLAX daily for regularity, Marisela-Colace for stool softener. PRN MOM, bisacodyl suppository or tablets for constipation. Time spent was 15 minutes with over 1/2 in direct patient care/examination, consultation and coordination of care.      Signed By: SHANON Tafoya    February 19, 2022

## 2022-02-19 NOTE — PROGRESS NOTES
PHYSICAL THERAPY DAILY NOTE  Time In: 2841  Time Out: 1106  Patient Seen For: AM;Gait training;Patient education; Therapeutic exercise;Transfer training; Other (see progress notes)    Subjective: Patient reporting she feels OK. Reports her right knee is stiff and sore. Reports he legs are still swollen but are improving. Objective:Vital Signs:02 at 1.5 lpm.   Patient Vitals for the past 12 hrs:   Temp Pulse Resp BP SpO2   02/19/22 1226 -- 69 -- (!) 146/56 97 %   02/19/22 0834 -- -- -- -- 97 %   02/19/22 0812 98.3 °F (36.8 °C) 69 16 131/82 96 %   02/19/22 0600 -- 68 16 127/71 --     Pain level:3to 7 out of 10  Pain location:right hip  Pain interventions:Medication per order, rest, positioning,relaxation, body mechanics, WBAT RLE      Patient education:Bed mobility training,transfer training, gait training, balance training,fall precautions, body mechanics,activity pacing, energy conservation,WBAT RLE precautions,Patient verbalizing understanding and demonstrating understanding of patient education. Recommend follow up education.     Interdisciplinary Communication:spoke with RN regarding pain medication schedule    Other (comment) (respiratory and hip)  GROSS ASSESSMENT Daily Assessment     NA       COGNITION Daily Assessment    Alert, able to follow commands,cooperating,participating, motivated, anxious regarding pain in RLE         BED/MAT MOBILITY Daily Assessment   Increased time and effort to complete with cues for body mechanics       Rolling Right : 0 (Not tested)  Rolling Left : 0 (Not tested)  Supine to Sit : 5 (Stand-by assistance)  Sit to Supine : 4 (Minimal assistance) (with RLE)       TRANSFERS Daily Assessment   Increased time and effort to complete with cues for body mechanics     Transfer Type: SPT with walker  Transfer Assistance : 5 (Stand-by assistance)  Sit to Stand Assistance: Stand-by assistance  Car Transfers: Not tested       GAIT Daily Assessment    Amount of Assistance: 5 (Stand-by assistance)  Distance (ft): 50 Feet (ft)  Assistive Device: Walker, rolling   Gait training with RW demonstrating slow start/stop step to antalgic gait pattern with decreased stance phase  RLE, decreased step length, decreased step clearance and flexed posture. Verbal and visual cues to correct gait deviations. No loss of balance      STEPS or STAIRS Daily Assessment    Steps/Stairs Ambulated (#): 0  Level of Assist : 0 (Not tested)       BALANCE Daily Assessment   No loss of balance during gait or transfer training Sitting - Static: Good (unsupported)  Sitting - Dynamic: Good (unsupported)  Standing - Static: Fair (with RW WBAT RLE)  Standing - Dynamic : Impaired       WHEELCHAIR MOBILITY Daily Assessment    Curbs/Ramps Assist Required (FIM Score): 0 (Not tested)  Wheelchair Setup Assist Required : 0 (Not tested)       LOWER EXTREMITY EXERCISES Daily Assessment   Increased time and effort to complete with multiple and frequent rest breaks. Cues for correct form   Extremity: Right  Exercise Type #1: Supine lower extremity strengthening  Sets Performed: 3  Reps Performed: 10  Level of Assist: Minimal assistance     SUPINE EXERCISES Sets Reps Comments   Ankle Pumps 3 10    Quad Sets 3 10    Glut Sets 3 10    Heel Slides 3 10    Hip Abduction 3 10    Short Arc Quad 3 10             Assessment: Gait distance improving daily with improved tolerance to weight bearing on RLE. Functional endurance improving. Body mechanics with bed mobility and transfers improving. Patient remained in  room at end of treatment and remained up in recliner with LEs elevated and with needs in reach. Plan of Care: Continue with POC and progress as tolerated.      Bridgette Hernandez, PT  2/19/2022

## 2022-02-19 NOTE — PROGRESS NOTES
Purposeful rounding completed during this shift. Needs met. No acute changes noted. Safety precautions remains in place. Bed low and locked. Call bell within reach. Problem: Falls - Risk of  Goal: *Absence of Falls  Description: Document Mica Martins Fall Risk and appropriate interventions in the flowsheet.   Outcome: Progressing Towards Goal  Note: Fall Risk Interventions:  Mobility Interventions: Communicate number of staff needed for ambulation/transfer,Patient to call before getting OOB,Utilize walker, cane, or other assistive device         Medication Interventions: Teach patient to arise slowly,Patient to call before getting OOB    Elimination Interventions: Call light in reach,Patient to call for help with toileting needs    History of Falls Interventions: Consult care management for discharge planning,Door open when patient unattended         Problem: Patient Education: Go to Patient Education Activity  Goal: Patient/Family Education  Outcome: Progressing Towards Goal

## 2022-02-20 PROCEDURE — 94760 N-INVAS EAR/PLS OXIMETRY 1: CPT

## 2022-02-20 PROCEDURE — 74011636637 HC RX REV CODE- 636/637: Performed by: PHYSICIAN ASSISTANT

## 2022-02-20 PROCEDURE — 99231 SBSQ HOSP IP/OBS SF/LOW 25: CPT | Performed by: PHYSICAL MEDICINE & REHABILITATION

## 2022-02-20 PROCEDURE — 2709999900 HC NON-CHARGEABLE SUPPLY

## 2022-02-20 PROCEDURE — 74011250637 HC RX REV CODE- 250/637: Performed by: PHYSICIAN ASSISTANT

## 2022-02-20 PROCEDURE — 77010033678 HC OXYGEN DAILY

## 2022-02-20 PROCEDURE — 74011000250 HC RX REV CODE- 250: Performed by: PHYSICAL MEDICINE & REHABILITATION

## 2022-02-20 PROCEDURE — 65310000000 HC RM PRIVATE REHAB

## 2022-02-20 PROCEDURE — 94640 AIRWAY INHALATION TREATMENT: CPT

## 2022-02-20 PROCEDURE — 74011250637 HC RX REV CODE- 250/637: Performed by: PHYSICAL MEDICINE & REHABILITATION

## 2022-02-20 RX ORDER — TORSEMIDE 20 MG/1
5 TABLET ORAL DAILY
Status: DISCONTINUED | OUTPATIENT
Start: 2022-02-21 | End: 2022-02-25 | Stop reason: HOSPADM

## 2022-02-20 RX ADMIN — LORAZEPAM 0.5 MG: 0.5 TABLET ORAL at 21:59

## 2022-02-20 RX ADMIN — METOPROLOL TARTRATE 50 MG: 50 TABLET, FILM COATED ORAL at 21:58

## 2022-02-20 RX ADMIN — GUAIFENESIN 1200 MG: 600 TABLET ORAL at 08:45

## 2022-02-20 RX ADMIN — ACETAMINOPHEN 650 MG: 325 TABLET ORAL at 12:57

## 2022-02-20 RX ADMIN — METHIMAZOLE 10 MG: 5 TABLET ORAL at 08:45

## 2022-02-20 RX ADMIN — PREDNISONE 30 MG: 20 TABLET ORAL at 12:41

## 2022-02-20 RX ADMIN — TIOTROPIUM BROMIDE INHALATION SPRAY 2 PUFF: 3.12 SPRAY, METERED RESPIRATORY (INHALATION) at 08:45

## 2022-02-20 RX ADMIN — BUDESONIDE AND FORMOTEROL FUMARATE DIHYDRATE 2 PUFF: 80; 4.5 AEROSOL RESPIRATORY (INHALATION) at 21:42

## 2022-02-20 RX ADMIN — TRAMADOL HYDROCHLORIDE 50 MG: 50 TABLET, COATED ORAL at 08:45

## 2022-02-20 RX ADMIN — METOPROLOL TARTRATE 50 MG: 50 TABLET, FILM COATED ORAL at 17:33

## 2022-02-20 RX ADMIN — GUAIFENESIN 1200 MG: 600 TABLET ORAL at 21:58

## 2022-02-20 RX ADMIN — ASPIRIN 81 MG: 81 TABLET ORAL at 08:46

## 2022-02-20 RX ADMIN — ACETAMINOPHEN 650 MG: 325 TABLET ORAL at 21:57

## 2022-02-20 RX ADMIN — ALBUTEROL SULFATE 2.5 MG: 2.5 SOLUTION RESPIRATORY (INHALATION) at 21:43

## 2022-02-20 RX ADMIN — METOPROLOL TARTRATE 50 MG: 50 TABLET, FILM COATED ORAL at 06:33

## 2022-02-20 RX ADMIN — OXYCODONE HYDROCHLORIDE 5 MG: 5 TABLET ORAL at 12:57

## 2022-02-20 RX ADMIN — ACETAMINOPHEN 650 MG: 325 TABLET ORAL at 06:33

## 2022-02-20 RX ADMIN — ESCITALOPRAM OXALATE 10 MG: 10 TABLET, FILM COATED ORAL at 08:46

## 2022-02-20 RX ADMIN — ALBUTEROL SULFATE 2.5 MG: 2.5 SOLUTION RESPIRATORY (INHALATION) at 08:45

## 2022-02-20 RX ADMIN — ATORVASTATIN CALCIUM 80 MG: 80 TABLET, FILM COATED ORAL at 21:57

## 2022-02-20 RX ADMIN — AMLODIPINE BESYLATE 7.5 MG: 5 TABLET ORAL at 08:46

## 2022-02-20 RX ADMIN — SENNOSIDES AND DOCUSATE SODIUM 2 TABLET: 8.6; 5 TABLET ORAL at 08:45

## 2022-02-20 RX ADMIN — TORSEMIDE 10 MG: 20 TABLET ORAL at 08:45

## 2022-02-20 RX ADMIN — BUDESONIDE AND FORMOTEROL FUMARATE DIHYDRATE 2 PUFF: 80; 4.5 AEROSOL RESPIRATORY (INHALATION) at 08:45

## 2022-02-20 RX ADMIN — PANTOPRAZOLE SODIUM 40 MG: 40 TABLET, DELAYED RELEASE ORAL at 08:46

## 2022-02-20 RX ADMIN — PANTOPRAZOLE SODIUM 40 MG: 40 TABLET, DELAYED RELEASE ORAL at 17:33

## 2022-02-20 RX ADMIN — PRASUGREL 10 MG: 10 TABLET, FILM COATED ORAL at 09:00

## 2022-02-20 RX ADMIN — TRAMADOL HYDROCHLORIDE 50 MG: 50 TABLET, COATED ORAL at 21:59

## 2022-02-20 NOTE — PROGRESS NOTES
Simi Rivas MD  Medical Director  [address] [facility], [address]  Tel: [phone] J[address] Elan PROGRESS NOTE    Jami Gastelum [facility]  Admit Date: 2/10/2022  Admit Diagnosis:   Femur fracture, right ([address] [S72.91XA]    Subjective     Patient seen and examined before AM therapies, just now eating breakfast, as she was dozing when tray delivered. Diarrhea has resolved. Pleased that B LE edema is much less after diuretic + compression stockings. No medical complaints. Patient seen and examined. Denies pain at rest, lightheadedness, SOB or nausea. Participating in therapy.     Objective:     Current Facility-Administered Medications   Medication Dose Route Frequency    predniSONE (DELTASONE) tablet 30 mg  30 mg Oral DAILY WITH LUNCH    torsemide (DEMADEX) tablet 10 mg  10 mg Oral DAILY    loperamide (IMODIUM) capsule 4 mg  4 mg Oral QID PRN    budesonide-formoterol (SYMBICORT) 80-4.5 mcg inhaler  2 Puff Inhalation BID RT    escitalopram oxalate (LEXAPRO) tablet 10 mg  10 mg Oral DAILY    amLODIPine (NORVASC) tablet 7.5 mg  7.5 mg Oral DAILY    oxyCODONE IR (ROXICODONE) tablet 5-10 mg  5-10 mg Oral Q4H PRN    albuterol (PROVENTIL VENTOLIN) nebulizer solution 2.5 mg  2.5 mg Nebulization BID RT    traMADoL (ULTRAM) tablet 50 mg  50 mg Oral Q6H PRN    nystatin (MYCOSTATIN) 100,000 unit/mL oral suspension 500,000 Units  500,000 Units Oral QID    0.9% sodium chloride infusion 250 mL  250 mL IntraVENous PRN    acetaminophen (TYLENOL) tablet 650 mg  650 mg Oral Q8H    senna-docusate (PERICOLACE) 8.6-50 mg per tablet 2 Tablet  2 Tablet Oral DAILY    sodium chloride (NS) flush 5-40 mL  5-40 mL IntraVENous PRN    alcohol 62% (NOZIN) nasal  1 Ampule  1 Ampule Topical Q12H    albuterol (PROVENTIL HFA, VENTOLIN HFA, PROAIR HFA) inhaler 2 Puff  2 Puff Inhalation Q6H PRN    albuterol-ipratropium (DUO-NEB) 2.5 MG-0.5 MG/3 ML  3 mL Nebulization Q6H PRN    aspirin delayed-release tablet 81 mg  81 mg Oral DAILY    atorvastatin (LIPITOR) tablet 80 mg  80 mg Oral QHS    bisacodyL (DULCOLAX) suppository 10 mg  10 mg Rectal DAILY PRN    guaiFENesin ER (MUCINEX) tablet 1,200 mg  1,200 mg Oral Q12H    lidocaine 4 % patch 1 Patch  1 Patch TransDERmal Q24H    LORazepam (ATIVAN) tablet 0.5 mg  0.5 mg Oral Q4H PRN    methIMAzole (TAPAZOLE) tablet 10 mg  10 mg Oral DAILY    metoprolol tartrate (LOPRESSOR) tablet 50 mg  50 mg Oral Q6H    nitroglycerin (NITROSTAT) tablet 0.4 mg  0.4 mg SubLINGual PRN    ondansetron (ZOFRAN ODT) tablet 4 mg  4 mg Oral Q6H PRN    pantoprazole (PROTONIX) tablet 40 mg  40 mg Oral BID    polyethylene glycol (MIRALAX) packet 17 g  17 g Oral DAILY    prasugreL (EFFIENT) tablet 10 mg  10 mg Oral DAILY    senna-docusate (PERICOLACE) 8.6-50 mg per tablet 1 Tablet  1 Tablet Oral BID PRN    tiotropium bromide (SPIRIVA RESPIMAT) 2.5 mcg /actuation  2 Puff Inhalation DAILY    traZODone (DESYREL) tablet 25 mg  25 mg Oral QHS PRN       Review of Systems:   Denies chest pain, shortness of breath, cough, headache, visual problems, abdominal pain, dysuria, calf pain. Pertinent positives are as noted in the HPI, ROS unremarkable otherwise. Visit Vitals  /70 (BP 1 Location: Left upper arm, BP Patient Position: Sitting)   Pulse 63   Temp 98.3 °F (36.8 °C)   Resp 18   SpO2 98%      Physical Exam:   General: Alert, appropriately oriented. Sitting up in bed with breakfast.   HEENT: Normocephalic, EOM intact. Oral mucosa moist.   Lungs: Decreased breath sounds at bases, otherwise clear to auscultation. Respirations even and unlabored on 1L NC, prolonged expiration. Heart: Regular rate and rhythm, normal S1, S2. No appreciable murmur. Abdomen: Soft, non-tender, not distended. Bowel sounds normoactive, no organomegaly. Genitourinary: Deferred.    Neuromuscular:      UE strength and ROM appear full and unencumbered. Unable to lift R LE off bed against gravity due to pain and weakness, strength improved distally; L LE weak proximally, distal strength intact. Sensation intact B LE. Skin/extremity: No rashes, no erythema. Calves soft, non-tender B LE. Trace right pedal edema, none on left. Functional Assessment:  Balance  Sitting - Static: Good (unsupported) (02/19/22 1300)  Sitting - Dynamic: Good (unsupported) (02/19/22 1300)  Standing - Static: Fair (with RW WBAT RLE) (02/19/22 1300)  Standing - Dynamic : Impaired (02/19/22 1300)       Clearence Skates Fall Risk Assessment:  Clearence SkAskem Fall Risk  Mobility: Ambulates or transfers with assist devices or assistance (02/20/22 0730)  Mobility Interventions: Patient to call before getting OOB;Utilize walker, cane, or other assistive device (02/20/22 0730)  Mentation: Alert, oriented x 3 (02/20/22 0730)  Medication: Patient receiving anticonvulsants, sedatives(tranquilizers), psychotropics or hypnotics, hypoglycemics, narcotics, sleep aids, antihypertensives, laxatives, or diuretics (02/20/22 0730)  Medication Interventions: Evaluate medications/consider consulting pharmacy; Patient to call before getting OOB; Teach patient to arise slowly (02/20/22 0730)  Elimination: Needs assistance with toileting (02/20/22 0730)  Elimination Interventions: Call light in reach; Patient to call for help with toileting needs (02/20/22 0730)  Prior Fall History: Before admission in past 12 months _home or previous inpatient care) (02/20/22 0730)  History of Falls Interventions: Consult care management for discharge planning;Door open when patient unattended;Evaluate medications/consider consulting pharmacy (02/20/22 0730)  Total Score: 4 (02/20/22 0730)  Standard Fall Precautions: Yes (02/18/22 2044)  High Fall Risk: Yes (02/20/22 0730)     Speech Assessment:  Aspiration Signs/Symptoms: None (02/14/22 1330)      Ambulation:  Gait  Distance (ft): 50 Feet (ft) (02/19/22 1300)  Assistive Device: jeevan Marley (02/19/22 1300)     Labs/Studies:  No results found for this or any previous visit (from the past 67 hour(s)). Assessment:     Problem List as of 2/20/2022 Date Reviewed: 2/14/2022          Codes Class Noted - Resolved    Chest pain ICD-10-CM: R07.9  ICD-9-CM: 786.50  5/31/2019 - Present        EKG, abnormal ICD-10-CM: R94.31  ICD-9-CM: 794.31  2/15/2022 - Present        * (Principal) Femur fracture, right ([facility] [address] ICD-10-CM: H17.71OW  ICD-9-CM: 821.00  2/11/2022 - Present        COPD with acute lower respiratory infection ([facility] [address] ICD-10-CM: J44.0  ICD-9-CM: 496, 519.8  2/10/2022 - Present        Moderate to severe mitral regurgitation ICD-10-CM: I34.0  ICD-9-CM: 424.0  2/8/2022 - Present        Iron deficiency anemia due to chronic blood loss (Chronic) ICD-10-CM: D50.0  ICD-9-CM: 280.0  2/8/2022 - Present        Closed right hip fracture ([facility] [address] ICD-10-CM: S72.001A  ICD-9-CM: 820.8  2/4/2022 - Present        Other fracture of right femur, initial encounter for closed fracture ([facility] [address] ICD-10-CM: Y33.0Y0A  ICD-9-CM: 821.00  2/4/2022 - Present        DNR (do not resuscitate) ICD-10-CM: Z66  ICD-9-CM: V49.86  11/9/2021 - Present    Overview Signed 11/9/2021 12:25 PM by Paola Damico NP     POST form completed - DNR but full treatment, including intubation. No TRACH. No PEG.               Acute bilateral low back pain without sciatica ICD-10-CM: M54.50  ICD-9-CM: 724.2, 338.19  11/9/2021 - Present        Palliative care patient ICD-10-CM: Z51.5  ICD-9-CM: V66.7  7/14/2021 - Present        Ischemic heart disease, hx pci, cath/pci last 5/2019 ICD-10-CM: I25.9  ICD-9-CM: 414.9  2/24/2021 - Present        H/O carotid endarterectomy ICD-10-CM: Z98.890  ICD-9-CM: V45.89  9/21/2020 - Present        Right ear pain ICD-10-CM: H92.01  ICD-9-CM: 388.70  9/21/2020 - Present        Pulmonary mass ICD-10-CM: R91.8  ICD-9-CM: 786.6  2/11/2020 - Present        Centrilobular emphysema ([facility] [address] ICD-10-CM: J43.2  ICD-9-CM: 492.8  12/20/2019 - Present        Carotid stenosis ICD-10-CM: I65.29  ICD-9-CM: 433.10  12/3/2019 - Present        Carotid stenosis, right ICD-10-CM: I65.21  ICD-9-CM: 433.10  12/3/2019 - Present        Hypertensive urgency ICD-10-CM: I16.0  ICD-9-CM: 401.9  6/8/2019 - Present        Chronic respiratory failure with hypoxia (HCC) (Chronic) ICD-10-CM: J96.11  ICD-9-CM: 518.83, 799.02  6/8/2019 - Present        CAD S/P percutaneous coronary angioplasty ICD-10-CM: I25.10, Z98.61  ICD-9-CM: 414.01, V45.82  5/31/2019 - Present        Ventricular ectopy ICD-10-CM: I49.3  ICD-9-CM: 427.69  5/9/2017 - Present        Chronic anxiety ICD-10-CM: F41.9  ICD-9-CM: 300.00  4/27/2017 - Present        Hyperlipidemia (Chronic) ICD-10-CM: E78.5  ICD-9-CM: 272.4  10/10/2016 - Present        Essential hypertension, benign (Chronic) ICD-10-CM: I10  ICD-9-CM: 401.1  10/10/2016 - Present        Coronary artery disease involving native coronary artery of native heart without angina pectoris ICD-10-CM: I25.10  ICD-9-CM: 414.01  10/10/2016 - Present        Hypoxemia ICD-10-CM: R09.02  ICD-9-CM: 799.02  8/25/2013 - Present    Overview Signed 2/5/2014  9:08 AM by Will Ayoub NP     FILIBERTO 8/2013. Pt had over 2 hours 1/2 hours of desaturations at night. O2 was ordered for her to start at 2L in September, but she stated she was feeling better than when FILIBERTO was done and refused O2. FILIBERTO 12/2013: not performed secondary to patient factors.               Acute respiratory failure with hypoxia [facility]) ICD-10-CM: J96.01  ICD-9-CM: 518.81  8/22/2013 - Present        COPD, very severe (HCC) (Chronic) ICD-10-CM: J44.9  ICD-9-CM: 496  8/20/2013 - Present        Personal history of tobacco use ICD-10-CM: Z95.577  ICD-9-CM: V15.82  8/19/2013 - Present        History of MI (myocardial infarction) ICD-10-CM: I25.2  ICD-9-CM: 412  8/19/2013 - Present    Overview Addendum 8/30/2018  9:20 AM by Deepthi Burton MD     STEMI 8/19/13:  Angioplasty for in-stent stenosis to LAD             RESOLVED: Chronic obstructive pulmonary disease (HCC) ICD-10-CM: J44.9  ICD-9-CM: 496  10/10/2016 - 8/30/2018        RESOLVED: Acute systolic heart failure ([address] ICD-10-CM: I50.21  ICD-9-CM: 428.21  8/22/2013 - 8/30/2018        RESOLVED: Pulmonary hemorrhage ICD-10-CM: R04.89  ICD-9-CM: 786.30  8/20/2013 - 6/25/2019        RESOLVED: CAD (coronary artery disease) (Chronic) ICD-10-CM: I25.10  ICD-9-CM: 414.00  8/19/2013 - 8/3/2021        RESOLVED: COPD (chronic obstructive pulmonary disease) (HCC) (Chronic) ICD-10-CM: J44.9  ICD-9-CM: 496  8/19/2013 - 8/20/2013            Right IT fracture s/p Gamma nail fixation (2/4/2022, Zechariah Lucio MD // Stiven Mendoza post-op acute hypoxic respiratory failure, CAD s/p cardiac catheterization and stent placement     Plan / Recommendations / Medical Decision Making:      Daily physician / PA medical management:     Right hip fracture - PT / OT // WBAT; dyspnea is barrier to progress. Poor endurance.      Hyperthyroidism - thyroid profile checked 2/7, found to be hyperthyroid. Started on methimazole.     CAD with acute chest pain - cardiac catheterization 2/7/2022, she is s/p PCI to LAD diagonal and to distal RCA. Severe MR; follow up with Cardiology.  Continue Effient.  -2/11 remote telemetry x 72hrs; if no chest pain and no events, will d/c telemetry  -2/12 chest pain this AM, tele monitor did not  a change ? indigestion ? Ranexa?  -2/14 no chest pain this AM, recognizes CP likely anxiety-related, no events on telemetry --> d/c telemetry  -2/15 CP did not appear cardiac in nature; prior to episode, she was having indigestion and wanted something other than what pharmacy provided; on Protonix BID already; ECG stable at 11 am, earlier patient was hypertensive, anxious; artifact on AM ECG  -2/17 no further CP     Chronic anemia with ABLA - Hgb 8.8< 9.2>8.9>7.1; stable; iron studies wnl.  -2/14 Hgb 8.9, low but stable     COPD, severe - continue PO steroids (40 mg x 3d, then 20mg daily x 3d, then 10mg x 2d then stop) and O2 supplementation; continue nebs; continue Mucinex  - on 1.5L O2 this AM, sats 96% at rest  - stable; insistent on having Symbicort; decrease prednisone to 20 mg; was not weaning as planned   - wean not yet initiated; decrease prednisone to 30mg at lunch x 3d then 20mg x 3d, etc,. starting today     Hypertension - BP fluctuating, managed medically. Hypotensive earlier today; nitro past and ACE inhibitor discontinued.   - /76 - 151/75, consider low dose ACE inhibitor  - normotensive x 48h, no changes  -2/15 /103, patient anxious at time; repeated 110/70  - /81 with anxiety; on Norvasc 5mg, Lopressor 50mg q6h; increase Norvasc to 7.5mg  - BP poorly controlled, Dr. Jenni Jiménez following; consider increasing Norvasc to 10mg daily; patient reports being on 10mg Demadex at home due to her edema; will restart  - /61 this AM, much better BP controlled with diuretic on board  -  HR and BP acceptable, 112/70     Hyperlipidemia - continue Zetia / Lipitor 80mg daily.     Pain management - will require regular pain assessment and comprehensive pain management. Has Tylenol and oxycodone PRN.    Pneumonia prophylaxis - incentive spirometer every hour while awake. On Levaquin until ; CXR  without acute findings.      DVT risk / DVT prophylaxis - daily physician / PA exam to assess as patient is at increased risk for of thromboembolism. Mobilize as tolerated. Sequential pneumatic compression devices (SCDs) when in bed; thigh-high or knee-high thromboembolic deterrent hose when out of bed. On Effient.     GI prophylaxis - resume PPI. At times may need additional antacids, Maalox prn.     Depression - ? Pt with anxiety that is apparent. PRN benzodiazepines.  - pt is very realistic about her health and decline.  She speaks calmly about having her  arrangements made and that it \"is what it is. \" Very much at peace. Encouraged her to participate and allow self to heal in the best way possible  -2/14 anxiety overshadowing her health overall, open to trying antidepressant / anxiolytic, has used Lexapro in past but does not recall +/- effects; revisit tomorrow  -2/16 agreeable to Lexapro 10mg daily     General skin care / wound prevention - monitor right incision and general skin wound status daily per staff and physician / PA. At risk for failure due to impaired mobility  -2/11 POD #7 incision c/d/i  -2/16 d/c staples 2/18  - 2/20 R hip incision healing with steri-strips    Bladder program / urinary retention - schedule voids q 6-8 hrs. Check post-void residual every shift and as needed; in-and-out catheter if post-void residual is more than 400ml.     Bowel program - at risk for constipation as a side effect of opioids, other medications, impaired mobility, etc. MiraLAX daily for regularity, Marisela-Colace for stool softener. PRN MOM, bisacodyl suppository or tablets for constipation. Time spent was 15 minutes with over 1/2 in direct patient care/examination, consultation and coordination of care.      Signed By: SHANON Hylton    February 20, 2022

## 2022-02-20 NOTE — PROGRESS NOTES
Problem: Falls - Risk of  Goal: *Absence of Falls  Description: Document Darby Marking Fall Risk and appropriate interventions in the flowsheet.   Outcome: Progressing Towards Goal  Note: Fall Risk Interventions:  Mobility Interventions: Patient to call before getting OOB,Utilize walker, cane, or other assistive device         Medication Interventions: Evaluate medications/consider consulting pharmacy,Patient to call before getting OOB    Elimination Interventions: Call light in reach,Patient to call for help with toileting needs    History of Falls Interventions: Consult care management for discharge planning,Door open when patient unattended,Evaluate medications/consider consulting pharmacy         Problem: Patient Education: Go to Patient Education Activity  Goal: Patient/Family Education  Outcome: Progressing Towards Goal

## 2022-02-21 ENCOUNTER — HOME HEALTH ADMISSION (OUTPATIENT)
Dept: HOME HEALTH SERVICES | Facility: HOME HEALTH | Age: 72
End: 2022-02-21
Payer: MEDICARE

## 2022-02-21 PROCEDURE — 74011250637 HC RX REV CODE- 250/637: Performed by: PHYSICAL MEDICINE & REHABILITATION

## 2022-02-21 PROCEDURE — 99232 SBSQ HOSP IP/OBS MODERATE 35: CPT | Performed by: PHYSICAL MEDICINE & REHABILITATION

## 2022-02-21 PROCEDURE — 77010033678 HC OXYGEN DAILY

## 2022-02-21 PROCEDURE — 97535 SELF CARE MNGMENT TRAINING: CPT

## 2022-02-21 PROCEDURE — 74011000250 HC RX REV CODE- 250: Performed by: PHYSICAL MEDICINE & REHABILITATION

## 2022-02-21 PROCEDURE — 74011636637 HC RX REV CODE- 636/637: Performed by: PHYSICAL MEDICINE & REHABILITATION

## 2022-02-21 PROCEDURE — 97530 THERAPEUTIC ACTIVITIES: CPT

## 2022-02-21 PROCEDURE — 97150 GROUP THERAPEUTIC PROCEDURES: CPT

## 2022-02-21 PROCEDURE — 94640 AIRWAY INHALATION TREATMENT: CPT

## 2022-02-21 PROCEDURE — 94760 N-INVAS EAR/PLS OXIMETRY 1: CPT

## 2022-02-21 PROCEDURE — 65310000000 HC RM PRIVATE REHAB

## 2022-02-21 PROCEDURE — 74011250637 HC RX REV CODE- 250/637: Performed by: PHYSICIAN ASSISTANT

## 2022-02-21 PROCEDURE — 97110 THERAPEUTIC EXERCISES: CPT

## 2022-02-21 PROCEDURE — 97116 GAIT TRAINING THERAPY: CPT

## 2022-02-21 RX ORDER — PREDNISONE 20 MG/1
20 TABLET ORAL
Status: COMPLETED | OUTPATIENT
Start: 2022-02-21 | End: 2022-02-23

## 2022-02-21 RX ORDER — PREDNISONE 10 MG/1
10 TABLET ORAL
Status: DISCONTINUED | OUTPATIENT
Start: 2022-02-21 | End: 2022-02-21

## 2022-02-21 RX ORDER — PREDNISONE 10 MG/1
10 TABLET ORAL
Status: DISCONTINUED | OUTPATIENT
Start: 2022-02-24 | End: 2022-02-25 | Stop reason: HOSPADM

## 2022-02-21 RX ORDER — PREDNISONE 20 MG/1
20 TABLET ORAL
Status: DISCONTINUED | OUTPATIENT
Start: 2022-02-21 | End: 2022-02-21

## 2022-02-21 RX ADMIN — ESCITALOPRAM OXALATE 10 MG: 10 TABLET, FILM COATED ORAL at 08:01

## 2022-02-21 RX ADMIN — PANTOPRAZOLE SODIUM 40 MG: 40 TABLET, DELAYED RELEASE ORAL at 08:02

## 2022-02-21 RX ADMIN — ACETAMINOPHEN 650 MG: 325 TABLET ORAL at 20:43

## 2022-02-21 RX ADMIN — ASPIRIN 81 MG: 81 TABLET ORAL at 08:02

## 2022-02-21 RX ADMIN — METOPROLOL TARTRATE 50 MG: 50 TABLET, FILM COATED ORAL at 11:43

## 2022-02-21 RX ADMIN — BUDESONIDE AND FORMOTEROL FUMARATE DIHYDRATE 2 PUFF: 80; 4.5 AEROSOL RESPIRATORY (INHALATION) at 21:00

## 2022-02-21 RX ADMIN — GUAIFENESIN 1200 MG: 600 TABLET ORAL at 20:45

## 2022-02-21 RX ADMIN — ALBUTEROL SULFATE 2.5 MG: 2.5 SOLUTION RESPIRATORY (INHALATION) at 21:00

## 2022-02-21 RX ADMIN — ACETAMINOPHEN 650 MG: 325 TABLET ORAL at 05:29

## 2022-02-21 RX ADMIN — OXYCODONE HYDROCHLORIDE 5 MG: 5 TABLET ORAL at 14:16

## 2022-02-21 RX ADMIN — ATORVASTATIN CALCIUM 80 MG: 80 TABLET, FILM COATED ORAL at 20:45

## 2022-02-21 RX ADMIN — PREDNISONE 20 MG: 20 TABLET ORAL at 11:42

## 2022-02-21 RX ADMIN — TORSEMIDE 5 MG: 20 TABLET ORAL at 08:01

## 2022-02-21 RX ADMIN — BUDESONIDE AND FORMOTEROL FUMARATE DIHYDRATE 2 PUFF: 80; 4.5 AEROSOL RESPIRATORY (INHALATION) at 08:00

## 2022-02-21 RX ADMIN — ACETAMINOPHEN 650 MG: 325 TABLET ORAL at 14:13

## 2022-02-21 RX ADMIN — PANTOPRAZOLE SODIUM 40 MG: 40 TABLET, DELAYED RELEASE ORAL at 17:07

## 2022-02-21 RX ADMIN — AMLODIPINE BESYLATE 7.5 MG: 5 TABLET ORAL at 08:02

## 2022-02-21 RX ADMIN — TIOTROPIUM BROMIDE INHALATION SPRAY 2 PUFF: 3.12 SPRAY, METERED RESPIRATORY (INHALATION) at 08:04

## 2022-02-21 RX ADMIN — METOPROLOL TARTRATE 50 MG: 50 TABLET, FILM COATED ORAL at 17:07

## 2022-02-21 RX ADMIN — OXYCODONE HYDROCHLORIDE 5 MG: 5 TABLET ORAL at 09:41

## 2022-02-21 RX ADMIN — TRAMADOL HYDROCHLORIDE 50 MG: 50 TABLET, COATED ORAL at 07:58

## 2022-02-21 RX ADMIN — METOPROLOL TARTRATE 50 MG: 50 TABLET, FILM COATED ORAL at 05:30

## 2022-02-21 RX ADMIN — LORAZEPAM 0.5 MG: 0.5 TABLET ORAL at 22:20

## 2022-02-21 RX ADMIN — PRASUGREL 10 MG: 10 TABLET, FILM COATED ORAL at 09:00

## 2022-02-21 RX ADMIN — METHIMAZOLE 10 MG: 5 TABLET ORAL at 08:01

## 2022-02-21 RX ADMIN — ALBUTEROL SULFATE 2 PUFF: 90 AEROSOL, METERED RESPIRATORY (INHALATION) at 08:04

## 2022-02-21 RX ADMIN — GUAIFENESIN 1200 MG: 600 TABLET ORAL at 08:02

## 2022-02-21 NOTE — PROGRESS NOTES
PHYSICAL THERAPY DAILY NOTE  Time In: 7911  Time Out: 8861  Patient Seen For: PM;Gait training;Patient education; Therapeutic exercise;Transfer training; Other (see progress notes)    Subjective: Patient reporting she had pain medication after lunch and was able to rest for over 3 hours. Reports pain is better for now         Objective:Vital Signs:02 at 1.5 lpm.  02 sat 94 to 98%  Patient Vitals for the past 12 hrs:   Temp Pulse Resp BP SpO2   02/21/22 1604 97.2 °F (36.2 °C) (!) 59 16 (!) 114/59 98 %   02/21/22 0819 97.4 °F (36.3 °C) 64 16 128/61 97 %   02/21/22 0809 -- -- -- -- 96 %   02/21/22 0529 -- 66 -- (!) 140/56 --     Pain level:2 to 5 out of 10  Pain location:right hip  Pain interventions:Medication per order, rest, positioning,relaxation, body mechanics, WBAT RLE      Patient education:Bed mobility training,transfer training, gait training, balance training,fall precautions, body mechanics,activity pacing, energy conservation,WBAT RLE precautions,Patient verbalizing understanding and demonstrating understanding of patient education. Recommend follow up education.     Interdisciplinary Communication:spoke with RN regarding pain medication schedule    Other (comment) (respiratory and hip)  GROSS ASSESSMENT Daily Assessment     NA       COGNITION Daily Assessment    Alert, able to follow commands,cooperating,participating, motivated, anxious regarding pain in RLE         BED/MAT MOBILITY Daily Assessment   Increased time and effort to complete with cues for body mechanics       Rolling Right : 0 (Not tested)  Rolling Left : 0 (Not tested)  Supine to Sit : 5 (Supervision)  Sit to Supine : 5 (Supervision)       TRANSFERS Daily Assessment   Increased time and effort to complete with cues for body mechanics     Transfer Type: SPT with walker  Transfer Assistance : 5 (Supervision/setup)  Sit to Stand Assistance: Supervision  Car Transfers: Not tested       GAIT Daily Assessment    Amount of Assistance: 5 (Supervision/setup)  Distance (ft): 60 Feet (ft)  Assistive Device: Walker, rolling   Gait training with RW demonstrating slow start/stop step to antalgic gait pattern with decreased stance phase  RLE, decreased step length, decreased step clearance and flexed posture. Verbal and visual cues to correct gait deviations. No loss of balance. Progressing towards partial step through gait pattern with improved stance phase RLE and improved step length LLE      STEPS or STAIRS Daily Assessment    NT       BALANCE Daily Assessment   No loss of balance during gait or transfer training Sitting - Static: Good (unsupported)  Sitting - Dynamic: Good (unsupported)  Standing - Static: Fair (with RW)  Standing - Dynamic : Impaired       WHEELCHAIR MOBILITY Daily Assessment    Curbs/Ramps Assist Required (FIM Score): 0 (Not tested)  Wheelchair Setup Assist Required : 0 (Not tested)       LOWER EXTREMITY EXERCISES Daily Assessment   Increased time and effort to complete with multiple and frequent rest breaks. Cues for correct form   Extremity: Right  Exercise Type #1: Supine lower extremity strengthening  Sets Performed: 3  Reps Performed: 10  Level of Assist: Minimal assistance     SUPINE EXERCISES Sets Reps Comments   Ankle Pumps 3 10    Quad Sets 3 10    Glut Sets 3 10    Heel Slides 3 10    Hip Abduction 3 10    Short Arc Quad 3 10             Assessment: Gait distance improving daily with improved tolerance to weight bearing on RLE. Functional endurance improving. Body mechanics with bed mobility and transfers improving. Progressing towards cont step through gait pattern. Patient remained in  room at end of treatment and remained up in recliner with LEs elevated and with needs in reach. Plan of Care: Continue with POC and progress as tolerated.      Vanessa Dumont, PT  2/21/2022

## 2022-02-21 NOTE — PROGRESS NOTES
Spiritual Care Visit, initial visit. Visited with patient at bedside to clarify issues about advance directive. .      Visit by Clair Roman, Staff .  Hailey., Faisal.MONSTER., B.A.

## 2022-02-21 NOTE — PROGRESS NOTES
PHYSICAL THERAPY DAILY NOTE  Time In: 1046  Time Out: 1133  Patient Seen For: AM;Family training;Gait training;Patient education; Therapeutic exercise;Transfer training; Other (see progress notes)    Subjective: Patient reporting she feels OK. Reports her right knee is stiff and sore. Reports he legs are still swollen but are improving. Objective:Vital Signs:02 at 1.5 lpm.   Patient Vitals for the past 12 hrs:   Temp Pulse Resp BP SpO2   02/21/22 0819 97.4 °F (36.3 °C) 64 16 128/61 97 %   02/21/22 0809 -- -- -- -- 96 %   02/21/22 0529 -- 66 -- (!) 140/56 --     Pain level:3to 7 out of 10  Pain location:right hip  Pain interventions:Medication per order, rest, positioning,relaxation, body mechanics, WBAT RLE      Patient education:Bed mobility training,transfer training, gait training, balance training,fall precautions, body mechanics,activity pacing, energy conservation,WBAT RLE precautions,Patient verbalizing understanding and demonstrating understanding of patient education. Recommend follow up education.     Interdisciplinary Communication:spoke with RN regarding pain medication schedule    Other (comment) (respiratory and hip)  GROSS ASSESSMENT Daily Assessment     NA       COGNITION Daily Assessment    Alert, able to follow commands,cooperating,participating, motivated, anxious regarding pain in RLE         BED/MAT MOBILITY Daily Assessment   Increased time and effort to complete with cues for body mechanics       Rolling Right : 0 (Not tested)  Rolling Left : 0 (Not tested)  Supine to Sit : 5 (Supervision)  Sit to Supine : 5 (Supervision)       TRANSFERS Daily Assessment   Increased time and effort to complete with cues for body mechanics     Transfer Type: SPT with walker  Transfer Assistance : 5 (Supervision/setup)  Sit to Stand Assistance: Supervision  Car Transfers: Not tested       GAIT Daily Assessment    Amount of Assistance: 5 (Supervision/setup)  Distance (ft): 50 Feet (ft)  Assistive Device: Walker, rolling   Gait training with RW demonstrating slow start/stop step to antalgic gait pattern with decreased stance phase  RLE, decreased step length, decreased step clearance and flexed posture. Verbal and visual cues to correct gait deviations. No loss of balance      STEPS or STAIRS Daily Assessment    Steps/Stairs Ambulated (#): 0  Level of Assist : 0 (Not tested)       BALANCE Daily Assessment   No loss of balance during gait or transfer training Sitting - Static: Good (unsupported)  Sitting - Dynamic: Good (unsupported)  Standing - Static: Fair (with RW)  Standing - Dynamic : Impaired       WHEELCHAIR MOBILITY Daily Assessment    Curbs/Ramps Assist Required (FIM Score): 0 (Not tested)  Wheelchair Setup Assist Required : 0 (Not tested)       LOWER EXTREMITY EXERCISES Daily Assessment   Increased time and effort to complete with multiple and frequent rest breaks. Cues for correct form   Extremity: Right  Exercise Type #1: Supine lower extremity strengthening  Sets Performed: 3  Reps Performed: 10  Level of Assist: Minimal assistance     SUPINE EXERCISES Sets Reps Comments   Ankle Pumps 3 10    Quad Sets 3 10    Glut Sets 3 10    Heel Slides 3 10    Hip Abduction 3 10    Short Arc Quad 3 10             Assessment: Gait distance improving daily with improved tolerance to weight bearing on RLE. Functional endurance improving. Body mechanics with bed mobility and transfers improving. Patient remained in  room at end of treatment and remained up in recliner with LEs elevated and with needs in reach. Plan of Care: Continue with POC and progress as tolerated.      Chelle Dobbins, PT  2/21/2022

## 2022-02-21 NOTE — PROGRESS NOTES
Problem: Falls - Risk of  Goal: *Absence of Falls  Description: Document Mirta Vasques Fall Risk and appropriate interventions in the flowsheet.   Outcome: Progressing Towards Goal  Note: Fall Risk Interventions:  Mobility Interventions: Communicate number of staff needed for ambulation/transfer,OT consult for ADLs,Patient to call before getting OOB,PT Consult for mobility concerns,PT Consult for assist device competence,Strengthening exercises (ROM-active/passive),Utilize walker, cane, or other assistive device         Medication Interventions: Assess postural VS orthostatic hypotension,Evaluate medications/consider consulting pharmacy,Patient to call before getting OOB,Teach patient to arise slowly    Elimination Interventions: Call light in reach,Elevated toilet seat,Patient to call for help with toileting needs,Stay With Me (per policy)    History of Falls Interventions: Consult care management for discharge planning,Door open when patient unattended,Evaluate medications/consider consulting pharmacy

## 2022-02-21 NOTE — ACP (ADVANCE CARE PLANNING)
Advance Care Planning     Advance Care Planning (ACP) Physician/NP/PA Conversation      Date of Conversation: 2/10/2022  Conducted with: Laurie Johnston and Cyndi Dillon.      Healthcare Decision Maker:     Primary: Orlando Hardy - [phone]    Secondary: Cyndi Dillon - [phone]          Diana Vaughn

## 2022-02-21 NOTE — PROGRESS NOTES
Heike Morales MD  Medical Director  [address] [facility], [place], [address] [facility]  Tel: [phone]       [facility] PROGRESS NOTE    [facility]  Admit Date: 2/10/2022  Admit Diagnosis:   Femur fracture, right ([address] [S72.91XA]    Subjective     Patient seen and examined before lunch, relieved that steroid taper issue has been cleared up. Reports more right hip soreness today but attributes that to dramatic change in activity (states, \"I pretty much slept all day yesterday\"). Pleased with decreased LE edema, notes she is able to don compression socks on her own. Admits to being both excited and anxious about discharge Friday. No medical complaints.     Objective:     Current Facility-Administered Medications   Medication Dose Route Frequency    predniSONE (DELTASONE) tablet 20 mg  20 mg Oral DAILY WITH LUNCH    [START ON 2/24/2022] predniSONE (DELTASONE) tablet 10 mg  10 mg Oral DAILY WITH LUNCH    torsemide (DEMADEX) tablet 5 mg  5 mg Oral DAILY    loperamide (IMODIUM) capsule 4 mg  4 mg Oral QID PRN    budesonide-formoterol (SYMBICORT) 80-4.5 mcg inhaler  2 Puff Inhalation BID RT    escitalopram oxalate (LEXAPRO) tablet 10 mg  10 mg Oral DAILY    amLODIPine (NORVASC) tablet 7.5 mg  7.5 mg Oral DAILY    oxyCODONE IR (ROXICODONE) tablet 5-10 mg  5-10 mg Oral Q4H PRN    albuterol (PROVENTIL VENTOLIN) nebulizer solution 2.5 mg  2.5 mg Nebulization BID RT    traMADoL (ULTRAM) tablet 50 mg  50 mg Oral Q6H PRN    nystatin (MYCOSTATIN) 100,000 unit/mL oral suspension 500,000 Units  500,000 Units Oral QID    0.9% sodium chloride infusion 250 mL  250 mL IntraVENous PRN    acetaminophen (TYLENOL) tablet 650 mg  650 mg Oral Q8H    senna-docusate (PERICOLACE) 8.6-50 mg per tablet 2 Tablet  2 Tablet Oral DAILY    sodium chloride (NS) flush 5-40 mL  5-40 mL IntraVENous PRN    alcohol 62% (NOZIN) nasal  1 Ampule  1 Ampule Topical Q12H    albuterol (PROVENTIL HFA, VENTOLIN HFA, PROAIR HFA) inhaler 2 Puff  2 Puff Inhalation Q6H PRN    albuterol-ipratropium (DUO-NEB) 2.5 MG-0.5 MG/3 ML  3 mL Nebulization Q6H PRN    aspirin delayed-release tablet 81 mg  81 mg Oral DAILY    atorvastatin (LIPITOR) tablet 80 mg  80 mg Oral QHS    bisacodyL (DULCOLAX) suppository 10 mg  10 mg Rectal DAILY PRN    guaiFENesin ER (MUCINEX) tablet 1,200 mg  1,200 mg Oral Q12H    lidocaine 4 % patch 1 Patch  1 Patch TransDERmal Q24H    LORazepam (ATIVAN) tablet 0.5 mg  0.5 mg Oral Q4H PRN    methIMAzole (TAPAZOLE) tablet 10 mg  10 mg Oral DAILY    metoprolol tartrate (LOPRESSOR) tablet 50 mg  50 mg Oral Q6H    nitroglycerin (NITROSTAT) tablet 0.4 mg  0.4 mg SubLINGual PRN    ondansetron (ZOFRAN ODT) tablet 4 mg  4 mg Oral Q6H PRN    pantoprazole (PROTONIX) tablet 40 mg  40 mg Oral BID    polyethylene glycol (MIRALAX) packet 17 g  17 g Oral DAILY    prasugreL (EFFIENT) tablet 10 mg  10 mg Oral DAILY    senna-docusate (PERICOLACE) 8.6-50 mg per tablet 1 Tablet  1 Tablet Oral BID PRN    tiotropium bromide (SPIRIVA RESPIMAT) 2.5 mcg /actuation  2 Puff Inhalation DAILY    traZODone (DESYREL) tablet 25 mg  25 mg Oral QHS PRN       Review of Systems:   Denies chest pain, shortness of breath, cough, headache, visual problems, abdominal pain, dysuria, calf pain. Pertinent positives are as noted in the HPI, ROS unremarkable otherwise. Visit Vitals  /61 (BP 1 Location: Right upper arm, BP Patient Position: Sitting)   Pulse 64   Temp 97.4 °F (36.3 °C)   Resp 16   SpO2 97%      Physical Exam:   General: Alert, appropriately oriented. OOB in recliner with B LE elevated. HEENT: Normocephalic, EOM intact. Oral mucosa moist.   Lungs: Decreased breath sounds, otherwise clear to auscultation. Respirations even and unlabored on 1L NC, prolonged expiration. Heart: Regular rate and rhythm, normal S1, S2. No appreciable murmur.    Abdomen: Soft, non-tender, not distended. Bowel sounds normoactive, no organomegaly. Genitourinary: Deferred. Neuromuscular:      UE strength and ROM appear full and unencumbered. Unable to lift R LE off bed against gravity due to pain and weakness, strength improved distally; L LE weak proximally, distal strength intact. Sensation intact B LE. Skin/extremity: No rashes, no erythema. Calves soft, non-tender B LE. No pedal edema. Functional Assessment:  Balance  Sitting - Static: Good (unsupported) (02/19/22 1300)  Sitting - Dynamic: Good (unsupported) (02/19/22 1300)  Standing - Static: Fair (with RW WBAT RLE) (02/19/22 1300)  Standing - Dynamic : Impaired (02/19/22 1300)       Sadi Storey Fall Risk Assessment:  Sadi Storey Fall Risk  Mobility: Ambulates or transfers with assist devices or assistance (02/21/22 0809)  Mobility Interventions: Communicate number of staff needed for ambulation/transfer;OT consult for ADLs; Patient to call before getting OOB;PT Consult for mobility concerns;PT Consult for assist device competence;Strengthening exercises (ROM-active/passive); Utilize walker, cane, or other assistive device (02/20/22 2340)  Mentation: Alert, oriented x 3 (02/21/22 0809)  Medication: Patient receiving anticonvulsants, sedatives(tranquilizers), psychotropics or hypnotics, hypoglycemics, narcotics, sleep aids, antihypertensives, laxatives, or diuretics (02/21/22 0809)  Medication Interventions: Assess postural VS orthostatic hypotension; Evaluate medications/consider consulting pharmacy; Patient to call before getting OOB; Teach patient to arise slowly (02/20/22 2340)  Elimination: Needs assistance with toileting (02/21/22 0809)  Elimination Interventions: Call light in reach;Elevated toilet seat;Patient to call for help with toileting needs;Stay With Me (per policy) (33/03/94 3718)  Prior Fall History: Before admission in past 12 months _home or previous inpatient care) (02/21/22 0809)  History of Falls Interventions: Consult care management for discharge planning;Door open when patient unattended;Evaluate medications/consider consulting pharmacy (02/20/22 2340)  Total Score: 4 (02/21/22 0809)  Standard Fall Precautions: Yes (02/21/22 0809)  High Fall Risk: Yes (02/21/22 0809)     Speech Assessment:  Aspiration Signs/Symptoms: None (02/14/22 1330)      Ambulation:  Gait  Distance (ft): 50 Feet (ft) (02/19/22 1300)  Assistive Device: Walker, rolling (02/19/22 1300)     Labs/Studies:  No results found for this or any previous visit (from the past 72 hour(s)). Assessment:     Problem List as of 2/21/2022 Date Reviewed: 2/14/2022          Codes Class Noted - Resolved    Chest pain ICD-10-CM: R07.9  ICD-9-CM: 786.50  5/31/2019 - Present        EKG, abnormal ICD-10-CM: R94.31  ICD-9-CM: 794.31  2/15/2022 - Present        * (Principal) Femur fracture, right ([facility] ICD-10-CM: J30.68CU  ICD-9-CM: 821.00  2/11/2022 - Present        COPD with acute lower respiratory infection ([facility] ICD-10-CM: J44.0  ICD-9-CM: 496, 519.8  2/10/2022 - Present        Moderate to severe mitral regurgitation ICD-10-CM: I34.0  ICD-9-CM: 424.0  2/8/2022 - Present        Iron deficiency anemia due to chronic blood loss (Chronic) ICD-10-CM: D50.0  ICD-9-CM: 280.0  2/8/2022 - Present        Closed right hip fracture ([facility] ICD-10-CM: S72.001A  ICD-9-CM: 820.8  2/4/2022 - Present        Other fracture of right femur, initial encounter for closed fracture ([facility] ICD-10-CM: J61.6H5X  ICD-9-CM: 821.00  2/4/2022 - Present        DNR (do not resuscitate) ICD-10-CM: Z66  ICD-9-CM: V49.86  11/9/2021 - Present    Overview Signed 11/9/2021 12:25 PM by Zoya Uribe, NP     POST form completed - DNR but full treatment, including intubation. No TRACH. No PEG.               Acute bilateral low back pain without sciatica ICD-10-CM: M54.50  ICD-9-CM: 724.2, 338.19  11/9/2021 - Present        Palliative care patient ICD-10-CM: Z51.5  ICD-9-CM: V66.7  7/14/2021 - Present        Ischemic heart disease, hx pci, cath/pci last 5/2019 ICD-10-CM: I25.9  ICD-9-CM: 414.9  2/24/2021 - Present        H/O carotid endarterectomy ICD-10-CM: Z98.890  ICD-9-CM: V45.89  9/21/2020 - Present        Right ear pain ICD-10-CM: H92.01  ICD-9-CM: 388.70  9/21/2020 - Present        Pulmonary mass ICD-10-CM: R91.8  ICD-9-CM: 786.6  2/11/2020 - Present        Centrilobular emphysema ([address] ICD-10-CM: J43.2  ICD-9-CM: 492.8  12/20/2019 - Present        Carotid stenosis ICD-10-CM: I65.29  ICD-9-CM: 433.10  12/3/2019 - Present        Carotid stenosis, right ICD-10-CM: I65.21  ICD-9-CM: 433.10  12/3/2019 - Present        Hypertensive urgency ICD-10-CM: I16.0  ICD-9-CM: 401.9  6/8/2019 - Present        Chronic respiratory failure with hypoxia (HCC) (Chronic) ICD-10-CM: J96.11  ICD-9-CM: 518.83, 799.02  6/8/2019 - Present        CAD S/P percutaneous coronary angioplasty ICD-10-CM: I25.10, Z98.61  ICD-9-CM: 414.01, V45.82  5/31/2019 - Present        Ventricular ectopy ICD-10-CM: I49.3  ICD-9-CM: 427.69  5/9/2017 - Present        Chronic anxiety ICD-10-CM: F41.9  ICD-9-CM: 300.00  4/27/2017 - Present        Hyperlipidemia (Chronic) ICD-10-CM: E78.5  ICD-9-CM: 272.4  10/10/2016 - Present        Essential hypertension, benign (Chronic) ICD-10-CM: I10  ICD-9-CM: 401.1  10/10/2016 - Present        Coronary artery disease involving native coronary artery of native heart without angina pectoris ICD-10-CM: I25.10  ICD-9-CM: 414.01  10/10/2016 - Present        Hypoxemia ICD-10-CM: R09.02  ICD-9-CM: 799.02  8/25/2013 - Present    Overview Signed 2/5/2014  9:08 AM by Charles Johnson NP     FILIBERTO 8/2013. Pt had over 2 hours 1/2 hours of desaturations at night. O2 was ordered for her to start at 2L in September, but she stated she was feeling better than when FILIBERTO was done and refused O2. FILIBERTO 12/2013: not performed secondary to patient factors.               Acute respiratory failure with hypoxia ([facility] ICD-10-CM: J96.01  ICD-9-CM: 518.81  8/22/2013 - Present        COPD, very severe ([facility] (Chronic) ICD-10-CM: J44.9  ICD-9-CM: 355  8/20/2013 - Present        Personal history of tobacco use ICD-10-CM: Z87.891  ICD-9-CM: V15.82  8/19/2013 - Present        History of MI (myocardial infarction) ICD-10-CM: I25.2  ICD-9-CM: 354  8/19/2013 - Present    Overview Addendum 8/30/2018  9:20 AM by Jeimy Miller MD     STEMI 8/19/13:  Angioplasty for in-stent stenosis to LAD             RESOLVED: Chronic obstructive pulmonary disease ([facility] ICD-10-CM: J44.9  ICD-9-CM: 496  10/10/2016 - 8/30/2018        RESOLVED: Acute systolic heart failure ([facility] ICD-10-CM: I50.21  ICD-9-CM: 428.21  8/22/2013 - 8/30/2018        RESOLVED: Pulmonary hemorrhage ICD-10-CM: R04.89  ICD-9-CM: 786.30  8/20/2013 - 6/25/2019        RESOLVED: CAD (coronary artery disease) (Chronic) ICD-10-CM: I25.10  ICD-9-CM: 414.00  8/19/2013 - 8/3/2021        RESOLVED: COPD (chronic obstructive pulmonary disease) (HCC) (Chronic) ICD-10-CM: J44.9  ICD-9-CM: 496  8/19/2013 - 8/20/2013            Right IT fracture s/p Gamma nail fixation (2/4/2022, Mica Chacon MD // [facility] post-op acute hypoxic respiratory failure, CAD s/p cardiac catheterization and stent placement     Plan / Recommendations / Medical Decision Making:      Daily physician / PA medical management:     Right hip fracture - PT / OT // WBAT; dyspnea is barrier to progress. Poor endurance.   -2/21 very slow but steady improvement     Hyperthyroidism - thyroid profile checked 2/7, found to be hyperthyroid. Started on methimazole.     CAD with acute chest pain - cardiac catheterization 2/7/2022, she is s/p PCI to LAD diagonal and to distal RCA. Severe MR; follow up with Cardiology.  Continue Effient.  -2/11 remote telemetry x 72hrs; if no chest pain and no events, will d/c telemetry  -2/12 chest pain this AM, tele monitor did not  a change ? indigestion ? Ranexa?  -2/14 no chest pain this AM, recognizes CP likely anxiety-related, no events on telemetry --> d/c telemetry  -2/15 CP did not appear cardiac in nature; prior to episode, she was having indigestion and wanted something other than what pharmacy provided; on Protonix BID already; ECG stable at 11 am, earlier patient was hypertensive, anxious; artifact on AM ECG  -2/17 no further CP     Chronic anemia with ABLA - Hgb 8.8< 9.2>8.9>7.1; stable; iron studies wnl.  -2/14 Hgb 8.9, low but stable     COPD, severe - continue PO steroids (40 mg x 3d, then 20mg daily x 3d, then 10mg x 2d then stop) and O2 supplementation; continue nebs; continue Mucinex  -2/11 on 1.5L O2 this AM, sats 96% at rest  -2/16 stable; insistent on having Symbicort; decrease prednisone to 20 mg; was not weaning as planned   -2/18 wean not yet initiated; decrease prednisone to 30mg at lunch x 3d then 20mg x 3d, etc, starting today  -2/21 starting prednisone 20mg daily x 3d today     Hypertension - BP fluctuating, managed medically. Hypotensive earlier today; nitro past and ACE inhibitor discontinued.   -2/12 /76 - 151/75, consider low dose ACE inhibitor  -2/14 normotensive x 48h, no changes  -2/15 /103, patient anxious at time; repeated 110/70  -2/16 /81 with anxiety; on Norvasc 5mg, Lopressor 50mg q6h; increase Norvasc to 7.5mg  -2/17 BP poorly controlled, Dr. Alexandro Ortiz following; consider increasing Norvasc to 10mg daily; patient reports being on 10mg Demadex at home due to her edema; will restart  -2/18 /61 this AM, much better BP controlled with diuretic on board  -2/20 HR and BP acceptable, 112/70  -2/21 /61, HR stays in 60s; continue current regimen     Hyperlipidemia - continue Zetia / Lipitor 80mg daily.     Pain management - will require regular pain assessment and comprehensive pain management. Has Tylenol and oxycodone PRN.      Pneumonia prophylaxis - incentive spirometer every hour while awake. On Levaquin until 2/13; CXR 2/9 without acute findings.      DVT risk / DVT prophylaxis - daily physician / PA exam to assess as patient is at increased risk for of thromboembolism. Mobilize as tolerated. Sequential pneumatic compression devices (SCDs) when in bed; thigh-high or knee-high thromboembolic deterrent hose when out of bed. On Effient.     GI prophylaxis - resume PPI. At times may need additional antacids, Maalox prn.     Depression - ? patient with anxiety that is apparent. PRN benzodiazepines.  - pt is very realistic about her health and decline. She speaks calmly about having her  arrangements made and that it \"is what it is. \" Very much at peace. Encouraged her to participate and allow self to heal in the best way possible  - anxiety overshadowing her health overall, open to trying antidepressant / anxiolytic, has used Lexapro in past but does not recall +/- effects; revisit tomorrow  - agreeable to Lexapro 10mg daily     General skin care / wound prevention - monitor right incision and general skin wound status daily per staff and physician / PA. At risk for failure due to impaired mobility  - POD #7 incision c/d/i  - d/c staples   - right hip incision healing with steri-strips    Bladder program / urinary retention - schedule voids q 6-8 hrs. Check post-void residual every shift and as needed; in-and-out catheter if post-void residual is more than 400ml.     Bowel program - at risk for constipation as a side effect of opioids, other medications, impaired mobility, etc. MiraLAX daily for regularity, Marisela-Colace for stool softener. PRN MOM, bisacodyl suppository or tablets for constipation. Time spent was 25 minutes with over 1/2 in direct patient care/examination, consultation and coordination of care. Signed By: Domenic Wolf PA-C    2022      Physician Assistant with [facility]  Anh Cano MD, Medical Director

## 2022-02-21 NOTE — PROGRESS NOTES
Chart reviewed. Met with pt and ex- at bedside. HH choices discussed and pt would like to use [facility]. Referral placed and liaison aware. DME needs for wheelchair order with [employer] and requested delivery by Thursday 2/24. CM to continue to follow and monitor for any further needs.

## 2022-02-21 NOTE — PROGRESS NOTES
Time In 0658   Time Out 0745     Mobility   Score Comments   Supine to Sit 6: Independent I   Sit to Stand 6: Independent I   Transfer Assist 4: Supervision or touching A Transfer Type: SPT   Equipment: Rolling Walker   Comments: S     Activities of Daily Living    Score Comments   Bathing 5: S/U or clean-up assist Type of Shower: Bath Pack  Position: Standing PRN and Unsupported Sitting   Adaptive  Equipment: Walker  Comments: S/U   Upper Body  Dressing 5: S/U or clean-up assist Items Applied: Pullover  Position: Unsupported Sitting  Comments: S/U   Lower Body Dressing 5: S/U or clean-up assist Items Applied: Underwear and Elastic pants  Position: Standing PRN and Unsupported Sitting  Adaptive Equipment: RW  Comments: S/U   Donning/[employer] Footwear 3: Partial/Moderate A Items Applied: Socks  Adaptive Equipment: N/A  Comments: A for L compression sock   Toilet Transfer 4: Supervision or touching A Transfer Type: SPT   Equipment: Rolling Walker   Comments: [id number] [facility] 6: Independent Output: None  Comments: I   Education  Toileting equipment for home; S/O issued handout so he can order one off [address] [facility] [address]. Pt was in bed and agreeable to tx. Pt's performance with ADL is reflected in above chart. Pt was able to apply one compression sock, but needed A for L side still. Pt was left at EOB with all needs within reach.      Elías Harris OT   2/21/2022

## 2022-02-21 NOTE — PROGRESS NOTES
OT Daily Note  Time In 1302   Time Out 1345     Pain: Patient had no complaint of pain. Functional Mobility      Score Comments   Sit to Stand 4: Supervision or touching A S   Transfer Assist 4: Supervision or touching A Transfer Type: SPT   Equipment: Rolling Walker   Comments: CGA   Pt walked across room. Pt c/o dizziness and started shaking while walking, but demonstrated good quality with sitting. Activity Tolerance   Pt stood 2x once for 13 minutes and once for approximately 4 minutes while engaging in one handed activity with no dizziness or LOB. Education   Showering     Interdisciplinary Communication: PT Marly Median on pt's DME    Plan: Continue with POC. Pt was left with PT Marly Median.      Zev Keller OTR/L  2/21/2022

## 2022-02-22 PROCEDURE — 97110 THERAPEUTIC EXERCISES: CPT

## 2022-02-22 PROCEDURE — 74011250637 HC RX REV CODE- 250/637: Performed by: PHYSICAL MEDICINE & REHABILITATION

## 2022-02-22 PROCEDURE — 74011636637 HC RX REV CODE- 636/637: Performed by: PHYSICAL MEDICINE & REHABILITATION

## 2022-02-22 PROCEDURE — 97535 SELF CARE MNGMENT TRAINING: CPT

## 2022-02-22 PROCEDURE — 97530 THERAPEUTIC ACTIVITIES: CPT

## 2022-02-22 PROCEDURE — 94640 AIRWAY INHALATION TREATMENT: CPT

## 2022-02-22 PROCEDURE — 65310000000 HC RM PRIVATE REHAB

## 2022-02-22 PROCEDURE — 74011000250 HC RX REV CODE- 250: Performed by: PHYSICAL MEDICINE & REHABILITATION

## 2022-02-22 PROCEDURE — 99232 SBSQ HOSP IP/OBS MODERATE 35: CPT | Performed by: PHYSICAL MEDICINE & REHABILITATION

## 2022-02-22 PROCEDURE — 97116 GAIT TRAINING THERAPY: CPT

## 2022-02-22 PROCEDURE — 2709999900 HC NON-CHARGEABLE SUPPLY

## 2022-02-22 PROCEDURE — 74011250637 HC RX REV CODE- 250/637: Performed by: PHYSICIAN ASSISTANT

## 2022-02-22 RX ADMIN — METOPROLOL TARTRATE 50 MG: 50 TABLET, FILM COATED ORAL at 00:00

## 2022-02-22 RX ADMIN — ATORVASTATIN CALCIUM 80 MG: 80 TABLET, FILM COATED ORAL at 20:36

## 2022-02-22 RX ADMIN — SENNOSIDES AND DOCUSATE SODIUM 2 TABLET: 8.6; 5 TABLET ORAL at 08:25

## 2022-02-22 RX ADMIN — OXYCODONE HYDROCHLORIDE 5 MG: 5 TABLET ORAL at 09:52

## 2022-02-22 RX ADMIN — PRASUGREL 10 MG: 10 TABLET, FILM COATED ORAL at 08:27

## 2022-02-22 RX ADMIN — TORSEMIDE 5 MG: 20 TABLET ORAL at 08:24

## 2022-02-22 RX ADMIN — PREDNISONE 20 MG: 20 TABLET ORAL at 13:02

## 2022-02-22 RX ADMIN — BUDESONIDE AND FORMOTEROL FUMARATE DIHYDRATE 2 PUFF: 80; 4.5 AEROSOL RESPIRATORY (INHALATION) at 08:03

## 2022-02-22 RX ADMIN — TIOTROPIUM BROMIDE INHALATION SPRAY 2 PUFF: 3.12 SPRAY, METERED RESPIRATORY (INHALATION) at 08:03

## 2022-02-22 RX ADMIN — ALBUTEROL SULFATE 2.5 MG: 2.5 SOLUTION RESPIRATORY (INHALATION) at 19:56

## 2022-02-22 RX ADMIN — BUDESONIDE AND FORMOTEROL FUMARATE DIHYDRATE 2 PUFF: 80; 4.5 AEROSOL RESPIRATORY (INHALATION) at 19:57

## 2022-02-22 RX ADMIN — GUAIFENESIN 1200 MG: 600 TABLET ORAL at 20:36

## 2022-02-22 RX ADMIN — PANTOPRAZOLE SODIUM 40 MG: 40 TABLET, DELAYED RELEASE ORAL at 17:34

## 2022-02-22 RX ADMIN — AMLODIPINE BESYLATE 7.5 MG: 5 TABLET ORAL at 08:24

## 2022-02-22 RX ADMIN — ESCITALOPRAM OXALATE 10 MG: 10 TABLET, FILM COATED ORAL at 08:25

## 2022-02-22 RX ADMIN — LORAZEPAM 0.5 MG: 0.5 TABLET ORAL at 20:41

## 2022-02-22 RX ADMIN — ASPIRIN 81 MG: 81 TABLET ORAL at 08:25

## 2022-02-22 RX ADMIN — NYSTATIN 500000 UNITS: 100000 SUSPENSION ORAL at 08:24

## 2022-02-22 RX ADMIN — ALBUTEROL SULFATE 2.5 MG: 2.5 SOLUTION RESPIRATORY (INHALATION) at 08:02

## 2022-02-22 RX ADMIN — ACETAMINOPHEN 650 MG: 325 TABLET ORAL at 05:55

## 2022-02-22 RX ADMIN — OXYCODONE HYDROCHLORIDE 5 MG: 5 TABLET ORAL at 15:33

## 2022-02-22 RX ADMIN — TRAMADOL HYDROCHLORIDE 50 MG: 50 TABLET, COATED ORAL at 20:41

## 2022-02-22 RX ADMIN — ACETAMINOPHEN 650 MG: 325 TABLET ORAL at 13:01

## 2022-02-22 RX ADMIN — METHIMAZOLE 10 MG: 5 TABLET ORAL at 08:25

## 2022-02-22 RX ADMIN — Medication 1 AMPULE: at 20:40

## 2022-02-22 RX ADMIN — GUAIFENESIN 1200 MG: 600 TABLET ORAL at 08:24

## 2022-02-22 RX ADMIN — POLYETHYLENE GLYCOL 3350 17 G: 17 POWDER, FOR SOLUTION ORAL at 08:26

## 2022-02-22 RX ADMIN — METOPROLOL TARTRATE 50 MG: 50 TABLET, FILM COATED ORAL at 05:55

## 2022-02-22 RX ADMIN — ACETAMINOPHEN 650 MG: 325 TABLET ORAL at 20:38

## 2022-02-22 RX ADMIN — METOPROLOL TARTRATE 50 MG: 50 TABLET, FILM COATED ORAL at 23:00

## 2022-02-22 RX ADMIN — PANTOPRAZOLE SODIUM 40 MG: 40 TABLET, DELAYED RELEASE ORAL at 08:24

## 2022-02-22 RX ADMIN — NYSTATIN 500000 UNITS: 100000 SUSPENSION ORAL at 20:41

## 2022-02-22 NOTE — PROGRESS NOTES
PHYSICAL THERAPY DAILY NOTE  Time In: 2296  Time Out: 5214  Patient Seen For: AM;Gait training;Patient education; Therapeutic exercise;Transfer training; Other (see progress notes)    Subjective: Patient reporting she is tired from AM ADLs. Reports she had her pain medication 1 hour before PT but she is still hurting. Reports her dizziness ins standing was worse. Objective:Vital Signs:02 at 1.5 lpm.  02 sat 94 to 98%  Patient Vitals for the past 12 hrs:   Temp Pulse Resp BP SpO2   02/22/22 0904 97.5 °F (36.4 °C) 96 20 (!) 119/42 95 %   02/22/22 0810 -- -- -- -- 95 %     Pain level:2 to 7 out of 10  Pain location:right hip  Pain interventions:Medication per order, rest, positioning,relaxation, body mechanics, WBAT RLE      Patient education:Bed mobility training,transfer training, gait training, balance training,fall precautions, body mechanics,activity pacing, energy conservation,WBAT RLE precautions,Patient verbalizing understanding and demonstrating understanding of patient education. Recommend follow up education.     Interdisciplinary Communication:spoke with RN regarding pain medication schedule    Other (comment) (respiratory and hip)  GROSS ASSESSMENT Daily Assessment     NA       COGNITION Daily Assessment    Alert, able to follow commands,cooperating,participating, motivated, anxious regarding pain in RLE         BED/MAT MOBILITY Daily Assessment   Increased time and effort to complete with cues for body mechanics       Rolling Right : 0 (Not tested)  Rolling Left : 0 (Not tested)  Supine to Sit : 5 (Supervision)  Sit to Supine : 5 (Supervision)       TRANSFERS Daily Assessment   Increased time and effort to complete with cues for body mechanics     Transfer Type: SPT with walker  Transfer Assistance : 5 (Supervision/setup)  Sit to Stand Assistance: Supervision  Car Transfers: Not tested       GAIT Daily Assessment    Amount of Assistance: 5 (Supervision/setup)  Distance (ft): 60 Feet (ft)  Assistive Device: Walker, rolling   Gait training with RW demonstrating slow start/stop step to antalgic gait pattern with decreased stance phase  RLE, decreased step length, decreased step clearance and flexed posture. Verbal and visual cues to correct gait deviations. No loss of balance. Progressing towards partial step through gait pattern with improved stance phase RLE and improved step length LLE      STEPS or STAIRS Daily Assessment    NT       BALANCE Daily Assessment   No loss of balance during gait or transfer training Sitting - Static: Good (unsupported)  Sitting - Dynamic: Good (unsupported)  Standing - Static: Fair (with RW)  Standing - Dynamic : Impaired       WHEELCHAIR MOBILITY Daily Assessment    Curbs/Ramps Assist Required (FIM Score): 0 (Not tested)  Wheelchair Setup Assist Required : 0 (Not tested)       LOWER EXTREMITY EXERCISES Daily Assessment   Increased time and effort to complete with multiple and frequent rest breaks. Cues for correct form   Extremity: Right  Exercise Type #1: Supine lower extremity strengthening  Sets Performed: 3  Reps Performed: 10  Level of Assist: Minimal assistance     SUPINE EXERCISES Sets Reps Comments   Ankle Pumps 3 10    Quad Sets 3 10    Glut Sets 3 10    Heel Slides 3 10    Hip Abduction 3 10    Short Arc Quad 3 10             Assessment: progressing towards a cont step through gait pattern with improved tolerance to wt bearing on RLE. Progressing towards set up assist with LE HEP. RLE AROM and strength improving. Patient remained in  room at end of treatment and remained up in recliner with LEs elevated and with needs in reach. 02 at 1.5 lpm    Plan of Care: Continue with POC and progress as tolerated.      Brett Menard, PT  2/22/2022

## 2022-02-22 NOTE — PROGRESS NOTES
Time In 0826   Time Out 0958     Mobility   Score Comments   Sit to Stand 4: Supervision or touching A S   Transfer Assist 4: Supervision or touching A Transfer Type: SPT   Equipment: Rolling Walker   Comments: S to manage O2     Activities of Daily Living    Score Comments   Oral Hygiene 6: Independent I   Bathing 4: Supervision or touching A Type of Shower: Shower  Position: Supported sitting and Standing PRN   Adaptive  Equipment: Tub Transfer Bench, Grab Bars and Walker  Comments: Only washed bottom and hair; CGA in standing   Upper Body  Dressing 5: S/U or clean-up assist Items Applied: Pullover  Position: Unsupported Sitting  Comments: S/U   Lower Body Dressing 5: S/U or clean-up assist Items Applied: Underwear and Elastic pants  Position: Unsupported Sitting  Adaptive Equipment: RW  Comments: S/U   Donning/[place] Footwear 4: Supervision or touching A Items Applied: Socks and Shoes with fasteners   Adaptive Equipment: N/A  Comments: Cueing   Education  Improvements made     Pt was in bed and agreeable to tx. Pt's performance with ADL is reflected in above chart. Pt got anxious in the shower and was only able to wash hair and bottom. Pt was able to don compression socks for the first time. Pt completed chair yoga exercises to hopefully decrease back pain. Pt was left in recliner with all needs within reach.      Susan Cortes, OT   2/22/2022

## 2022-02-22 NOTE — PROGRESS NOTES
PHYSICAL THERAPY DAILY NOTE  Time In: 1601  Time Out: 0571  Patient Seen For: PM;Gait training;Patient education; Therapeutic exercise;Transfer training; Other (see progress notes)    Subjective: Patient reporting she had her pain medication about 25 minutes ago. Reports she is hurting more this PM and she is still having dizziness in standing. Objective:Vital Signs:02 at 1.5 lpm.  02 sat 95 to 98%  Patient Vitals for the past 12 hrs:   Temp Pulse Resp BP SpO2   02/22/22 1550 98.1 °F (36.7 °C) 65 18 (!) 117/46 97 %   02/22/22 1308 -- -- -- (!) 126/57 --   02/22/22 0904 97.5 °F (36.4 °C) 96 20 (!) 119/42 95 %   02/22/22 0810 -- -- -- -- 95 %     Pain level:2 to 7 out of 10  Pain location:right hip  Pain interventions:Medication per order, rest, positioning,relaxation, body mechanics, WBAT RLE      Patient education:Bed mobility training,transfer training, gait training, balance training,fall precautions, body mechanics,activity pacing, energy conservation,WBAT RLE precautions,Patient verbalizing understanding and demonstrating understanding of patient education. Recommend follow up education.     Interdisciplinary Communication:spoke with RN regarding pain medication schedule    Other (comment) (respiratory and hip)  GROSS ASSESSMENT Daily Assessment     NA       COGNITION Daily Assessment    Alert, able to follow commands,cooperating,participating, motivated, anxious regarding pain in RLE         BED/MAT MOBILITY Daily Assessment   Increased time and effort to complete with cues for body mechanics       Rolling Right : 0 (Not tested)  Rolling Left : 0 (Not tested)  Supine to Sit : 5 (Supervision)  Sit to Supine : 5 (Supervision)       TRANSFERS Daily Assessment   Increased time and effort to complete with cues for body mechanics     Transfer Type: SPT with walker  Transfer Assistance : 5 (Supervision/setup)  Sit to Stand Assistance: Supervision  Car Transfers: Not tested       GAIT Daily Assessment    Amount of Assistance: 5 (Supervision/setup)  Distance (ft): 60 Feet (ft)  Assistive Device: Walker, rolling   Gait training with RW demonstrating slow start/stop step to antalgic gait pattern with decreased stance phase  RLE, decreased step length, decreased step clearance and flexed posture. Verbal and visual cues to correct gait deviations. No loss of balance. Progressing towards partial step through gait pattern with improved stance phase RLE and improved step length LLE      STEPS or STAIRS Daily Assessment    NT       BALANCE Daily Assessment   No loss of balance during gait or transfer training Sitting - Static: Good (unsupported)  Sitting - Dynamic: Good (unsupported)  Standing - Static: Fair (with RW)  Standing - Dynamic : Impaired       WHEELCHAIR MOBILITY Daily Assessment    Curbs/Ramps Assist Required (FIM Score): 0 (Not tested)  Wheelchair Setup Assist Required : 0 (Not tested)       LOWER EXTREMITY EXERCISES Daily Assessment   Increased time and effort to complete with multiple and frequent rest breaks. Cues for correct form   Extremity: Right  Exercise Type #1: Supine lower extremity strengthening  Sets Performed: 3  Reps Performed: 10  Level of Assist: Minimal assistance     SUPINE EXERCISES Sets Reps Comments   Ankle Pumps 3 10    Quad Sets 3 10    Glut Sets 3 10    Heel Slides 3 10    Hip Abduction 3 10    Short Arc Quad 3 10             Assessment: Cont to have orthostatic hypotension limiting standing time. Gait distance limited due to fatigue. Tolerance to wt bearing on RLE slowly improving. Progressing towards a cont step through gait pattern       Patient remained in  room at end of treatment and remained up in recliner with LEs elevated and with needs in reach. 02 at 1.5 lpm    Plan of Care: Continue with POC and progress as tolerated.      Laura Springer, PT  2/22/2022

## 2022-02-22 NOTE — PROGRESS NOTES
Patient sitting in recliner watching tv. She did get up to use the restroom while I was in there. She ambulates well with her walker. She has no complaints at this time. Continue to monitor.

## 2022-02-22 NOTE — PROGRESS NOTES
Inge Garcia MD  Medical Director  [address] [facility], [address] [facility]  Tel: [phone]       [facility] PROGRESS NOTE    [facility]  Admit Date: 2/10/2022  Admit Diagnosis:   Femur fracture, right ([address] [S72.91XA]    Subjective     Patient seen and examined. Looks good. Feeling well. Affect so much brighter. Much less anxiety. Denies cp, sob, therapies are going well.  Feeling MUCH more confident about dc late this week    Objective:     Current Facility-Administered Medications   Medication Dose Route Frequency    predniSONE (DELTASONE) tablet 20 mg  20 mg Oral DAILY WITH LUNCH    [START ON 2/24/2022] predniSONE (DELTASONE) tablet 10 mg  10 mg Oral DAILY WITH LUNCH    torsemide (DEMADEX) tablet 5 mg  5 mg Oral DAILY    loperamide (IMODIUM) capsule 4 mg  4 mg Oral QID PRN    budesonide-formoterol (SYMBICORT) 80-4.5 mcg inhaler  2 Puff Inhalation BID RT    escitalopram oxalate (LEXAPRO) tablet 10 mg  10 mg Oral DAILY    amLODIPine (NORVASC) tablet 7.5 mg  7.5 mg Oral DAILY    oxyCODONE IR (ROXICODONE) tablet 5-10 mg  5-10 mg Oral Q4H PRN    albuterol (PROVENTIL VENTOLIN) nebulizer solution 2.5 mg  2.5 mg Nebulization BID RT    traMADoL (ULTRAM) tablet 50 mg  50 mg Oral Q6H PRN    nystatin (MYCOSTATIN) 100,000 unit/mL oral suspension 500,000 Units  500,000 Units Oral QID    0.9% sodium chloride infusion 250 mL  250 mL IntraVENous PRN    acetaminophen (TYLENOL) tablet 650 mg  650 mg Oral Q8H    senna-docusate (PERICOLACE) 8.6-50 mg per tablet 2 Tablet  2 Tablet Oral DAILY    sodium chloride (NS) flush 5-40 mL  5-40 mL IntraVENous PRN    alcohol 62% (NOZIN) nasal  1 Ampule  1 Ampule Topical Q12H    albuterol (PROVENTIL HFA, VENTOLIN HFA, PROAIR HFA) inhaler 2 Puff  2 Puff Inhalation Q6H PRN    albuterol-ipratropium (DUO-NEB) 2.5 MG-0.5 MG/3 ML  3 mL Nebulization Q6H PRN    aspirin delayed-release tablet 81 mg  81 mg Oral DAILY    atorvastatin (LIPITOR) tablet 80 mg  80 mg Oral QHS    bisacodyL (DULCOLAX) suppository 10 mg  10 mg Rectal DAILY PRN    guaiFENesin ER (MUCINEX) tablet 1,200 mg  1,200 mg Oral Q12H    lidocaine 4 % patch 1 Patch  1 Patch TransDERmal Q24H    LORazepam (ATIVAN) tablet 0.5 mg  0.5 mg Oral Q4H PRN    methIMAzole (TAPAZOLE) tablet 10 mg  10 mg Oral DAILY    metoprolol tartrate (LOPRESSOR) tablet 50 mg  50 mg Oral Q6H    nitroglycerin (NITROSTAT) tablet 0.4 mg  0.4 mg SubLINGual PRN    ondansetron (ZOFRAN ODT) tablet 4 mg  4 mg Oral Q6H PRN    pantoprazole (PROTONIX) tablet 40 mg  40 mg Oral BID    polyethylene glycol (MIRALAX) packet 17 g  17 g Oral DAILY    prasugreL (EFFIENT) tablet 10 mg  10 mg Oral DAILY    senna-docusate (PERICOLACE) 8.6-50 mg per tablet 1 Tablet  1 Tablet Oral BID PRN    tiotropium bromide (SPIRIVA RESPIMAT) 2.5 mcg /actuation  2 Puff Inhalation DAILY    traZODone (DESYREL) tablet 25 mg  25 mg Oral QHS PRN       Review of Systems:   Denies chest pain, shortness of breath, cough, headache, visual problems, abdominal pain, dysuria, calf pain. Pertinent positives are as noted in the HPI, ROS unremarkable otherwise. Visit Vitals  BP (!) 119/42 (BP 1 Location: Right arm)   Pulse 96   Temp 97.5 °F (36.4 °C)   Resp 20   SpO2 95%    On 1L O2    Physical Exam:   General: Alert and age appropriately oriented. No acute cardiorespiratory distress. HEENT: Normocephalic, no scleral icterus. Oral mucosa moist without cyanosis. Lungs: Clear to auscultation bilaterally. Respiration even and unlabored. Heart: Regular rate and rhythm, S1, S2. No murmurs, clicks, rub or gallops. Abdomen: Soft, non-tender, not distended. Bowel sounds normoactive. No organomegaly. Genitourinary: Deferred. Neuromuscular:      Proximal generalized weakness  RLE hip flexion 2 due to hip pain   Skin/extremity: No rashes, no erythema. No calf tenderness B LE.   Right hip healing well.  Edema in LEs resolved                                                                              Functional Assessment:          Balance  Sitting - Static: Good (unsupported) (02/21/22 1700)  Sitting - Dynamic: Good (unsupported) (02/21/22 1700)  Standing - Static: Fair (with RW) (02/21/22 1700)  Standing - Dynamic : Impaired (02/21/22 1700)                     [facility] Fall Risk Assessment:  [facility] Fall Risk  Mobility: Ambulates or transfers with assist devices or assistance (02/22/22 1023)  Mobility Interventions: Communicate number of staff needed for ambulation/transfer (02/22/22 1023)  Mentation: Alert, oriented x 3 (02/22/22 1023)  Medication: Patient receiving anticonvulsants, sedatives(tranquilizers), psychotropics or hypnotics, hypoglycemics, narcotics, sleep aids, antihypertensives, laxatives, or diuretics (02/22/22 1023)  Medication Interventions: Patient to call before getting OOB (02/22/22 1023)  Elimination: Needs assistance with toileting (02/22/22 1023)  Elimination Interventions: Call light in reach (02/22/22 1023)  Prior Fall History: Before admission in past 12 months _home or previous inpatient care) (02/22/22 1023)  History of Falls Interventions: Consult care management for discharge planning (02/22/22 1023)  Total Score: 4 (02/22/22 1023)  Standard Fall Precautions: Yes (02/22/22 1023)  High Fall Risk: Yes (02/22/22 1023)     Speech Assessment:  Aspiration Signs/Symptoms: None (02/14/22 1330)      Ambulation:  Gait  Distance (ft): 60 Feet (ft) (02/21/22 1700)  Assistive Device: Walker, rolling (02/21/22 1700)  Rail Use:  (with RW) (02/21/22 1700)     Labs/Studies:  No results found for this or any previous visit (from the past 72 hour(s)).     Assessment:     Problem List as of 2/22/2022 Date Reviewed: 2/14/2022          Codes Class Noted - Resolved    Chest pain ICD-10-CM: R07.9  ICD-9-CM: 786.50  5/31/2019 - Present        EKG, abnormal ICD-10-CM: R94.31  ICD-9-CM: 794.31  2/15/2022 - Present        * (Principal) Femur fracture, right ([facility] [address] ICD-10-CM: F64.51EQ  ICD-9-CM: 821.00  2/11/2022 - Present        COPD with acute lower respiratory infection ([facility] ICD-10-CM: J44.0  ICD-9-CM: 496, 519.8  2/10/2022 - Present        Moderate to severe mitral regurgitation ICD-10-CM: I34.0  ICD-9-CM: 424.0  2/8/2022 - Present        Iron deficiency anemia due to chronic blood loss (Chronic) ICD-10-CM: D50.0  ICD-9-CM: 280.0  2/8/2022 - Present        Closed right hip fracture ([facility] ICD-10-CM: S72.001A  ICD-9-CM: 820.8  2/4/2022 - Present        Other fracture of right femur, initial encounter for closed fracture ([facility] ICD-10-CM: F94.5D2Y  ICD-9-CM: 821.00  2/4/2022 - Present        DNR (do not resuscitate) ICD-10-CM: Z66  ICD-9-CM: V49.86  11/9/2021 - Present    Overview Signed 11/9/2021 12:25 PM by Valery Rivera NP     POST form completed - DNR but full treatment, including intubation. No TRACH. No PEG.               Acute bilateral low back pain without sciatica ICD-10-CM: M54.50  ICD-9-CM: 724.2, 338.19  11/9/2021 - Present        Palliative care patient ICD-10-CM: Z51.5  ICD-9-CM: V66.7  7/14/2021 - Present        Ischemic heart disease, hx pci, cath/pci last 5/2019 ICD-10-CM: I25.9  ICD-9-CM: 414.9  2/24/2021 - Present        H/O carotid endarterectomy ICD-10-CM: Z98.890  ICD-9-CM: V45.89  9/21/2020 - Present        Right ear pain ICD-10-CM: H92.01  ICD-9-CM: 388.70  9/21/2020 - Present        Pulmonary mass ICD-10-CM: R91.8  ICD-9-CM: 786.6  2/11/2020 - Present        Centrilobular emphysema ([address] ICD-10-CM: J43.2  ICD-9-CM: 492.8  12/20/2019 - Present        Carotid stenosis ICD-10-CM: I65.29  ICD-9-CM: 433.10  12/3/2019 - Present        Carotid stenosis, right ICD-10-CM: I65.21  ICD-9-CM: 433.10  12/3/2019 - Present        Hypertensive urgency ICD-10-CM: I16.0  ICD-9-CM: 401.9  6/8/2019 - Present        Chronic respiratory failure with hypoxia (HCC) (Chronic) ICD-10-CM: J96.11  ICD-9-CM: 518.83, 799.02  6/8/2019 - Present        CAD S/P percutaneous coronary angioplasty ICD-10-CM: I25.10, Z98.61  ICD-9-CM: 414.01, V45.82  5/31/2019 - Present        Ventricular ectopy ICD-10-CM: I49.3  ICD-9-CM: 427.69  5/9/2017 - Present        Chronic anxiety ICD-10-CM: F41.9  ICD-9-CM: 300.00  4/27/2017 - Present        Hyperlipidemia (Chronic) ICD-10-CM: E78.5  ICD-9-CM: 272.4  10/10/2016 - Present        Essential hypertension, benign (Chronic) ICD-10-CM: I10  ICD-9-CM: 401.1  10/10/2016 - Present        Coronary artery disease involving native coronary artery of native heart without angina pectoris ICD-10-CM: I25.10  ICD-9-CM: 414.01  10/10/2016 - Present        Hypoxemia ICD-10-CM: R09.02  ICD-9-CM: 799.02  8/25/2013 - Present    Overview Signed 2/5/2014  9:08 AM by Lisbeth Bradley NP     FILIBERTO 8/2013. Pt had over 2 hours 1/2 hours of desaturations at night. O2 was ordered for her to start at 2L in September, but she stated she was feeling better than when FILIBERTO was done and refused O2. FILIBERTO 12/2013: not performed secondary to patient factors.               Acute respiratory failure with hypoxia (HCC) ICD-10-CM: J96.01  ICD-9-CM: 518.81  8/22/2013 - Present        COPD, very severe (HCC) (Chronic) ICD-10-CM: J44.9  ICD-9-CM: 496  8/20/2013 - Present        Personal history of tobacco use ICD-10-CM: B08.019  ICD-9-CM: V15.82  8/19/2013 - Present        History of MI (myocardial infarction) ICD-10-CM: I25.2  ICD-9-CM: 412  8/19/2013 - Present    Overview Addendum 8/30/2018  9:20 AM by Neli Streeter MD     STEMI 8/19/13:  Angioplasty for in-stent stenosis to LAD             RESOLVED: Chronic obstructive pulmonary disease ([facility] ICD-10-CM: J44.9  ICD-9-CM: 496  10/10/2016 - 8/30/2018        RESOLVED: Acute systolic heart failure ([facility] ICD-10-CM: I50.21  ICD-9-CM: 428.21  8/22/2013 - 8/30/2018        RESOLVED: Pulmonary hemorrhage ICD-10-CM: R04.89  ICD-9-CM: 786.30  8/20/2013 - 6/25/2019        RESOLVED: CAD (coronary artery disease) (Chronic) ICD-10-CM: I25.10  ICD-9-CM: 414.00  8/19/2013 - 8/3/2021        RESOLVED: COPD (chronic obstructive pulmonary disease) (HCC) (Chronic) ICD-10-CM: J44.9  ICD-9-CM: 496  8/19/2013 - 8/20/2013            Right IT fracture s/p Gamma nail fixation (2/4/2022, Wing Parveen MD // Dae Perez post-op acute hypoxic respiratory failure, CAD s/p cardiac catheterization and stent placement     Plan / Recommendations / Medical Decision Making:      Daily physician / PA medical management:     Right hip fracture - PT / OT // WBAT; dyspnea is barrier to progress. Poor endurance.   -2/21 very slow but steady improvement     Hyperthyroidism - thyroid profile checked 2/7, found to be hyperthyroid. Started on methimazole.     CAD with acute chest pain - cardiac catheterization 2/7/2022, she is s/p PCI to LAD diagonal and to distal RCA. Severe MR; follow up with Cardiology.  Continue Effient.  -2/11 remote telemetry x 72hrs; if no chest pain and no events, will d/c telemetry  -2/12 chest pain this AM, tele monitor did not  a change ? indigestion ? Ranexa?  -2/14 no chest pain this AM, recognizes CP likely anxiety-related, no events on telemetry --> d/c telemetry  -2/15 CP did not appear cardiac in nature; prior to episode, she was having indigestion and wanted something other than what pharmacy provided; on Protonix BID already; ECG stable at 11 am, earlier patient was hypertensive, anxious; artifact on AM ECG  -2/17 no further CP     Chronic anemia with ABLA - Hgb 8.8< 9.2>8.9>7.1; stable; iron studies wnl.  -2/14 Hgb 8.9, low but stable  -2/22 recheck labs in a.m.     COPD, severe - continue PO steroids (40 mg x 3d, then 20mg daily x 3d, then 10mg x 2d then stop) and O2 supplementation; continue nebs; continue Mucinex  -2/11 on 1.5L O2 this AM, sats 96% at rest  -2/16 stable; insistent on having Symbicort; decrease prednisone to 20 mg; was not weaning as planned   -2/18 wean not yet initiated; decrease prednisone to 30mg at lunch x 3d then 20mg x 3d, etc, starting today  - starting prednisone 20mg daily x 3d today     Hypertension - BP fluctuating, managed medically. Hypotensive earlier today; nitro past and ACE inhibitor discontinued.   - /76 - 151/75, consider low dose ACE inhibitor  - normotensive x 48h, no changes  -2/15 /103, patient anxious at time; repeated 110/70  - /81 with anxiety; on Norvasc 5mg, Lopressor 50mg q6h; increase Norvasc to 7.5mg  - BP poorly controlled, Dr. Jana Tello following; consider increasing Norvasc to 10mg daily; patient reports being on 10mg Demadex at home due to her edema; will restart  - /61 this AM, much better BP controlled with diuretic on board  - HR and BP acceptable, 112/70  - /61, HR stays in 60s; continue current regimen  -VSS     Hyperlipidemia - continue Zetia / Lipitor 80mg daily.     Pain management - will require regular pain assessment and comprehensive pain management. Has Tylenol and oxycodone PRN.    Pneumonia prophylaxis - incentive spirometer every hour while awake. On Levaquin until ; CXR  without acute findings.      DVT risk / DVT prophylaxis - daily physician / PA exam to assess as patient is at increased risk for of thromboembolism. Mobilize as tolerated. Sequential pneumatic compression devices (SCDs) when in bed; thigh-high or knee-high thromboembolic deterrent hose when out of bed. On Effient.     GI prophylaxis - resume PPI. At times may need additional antacids, Maalox prn.     Depression - ? patient with anxiety that is apparent. PRN benzodiazepines.  - pt is very realistic about her health and decline. She speaks calmly about having her  arrangements made and that it \"is what it is. \" Very much at peace.  Encouraged her to participate and allow self to heal in the best way possible  - anxiety overshadowing her health overall, open to trying antidepressant / anxiolytic, has used Lexapro in past but does not recall +/- effects; revisit tomorrow  -2/16 agreeable to Lexapro 10mg daily  -2/22 doing so much better, decreased anxiety     General skin care / wound prevention - monitor right incision and general skin wound status daily per staff and physician / PA. At risk for failure due to impaired mobility  -2/11 POD #7 incision c/d/i  -2/16 d/c staples 2/18  -2/20 right hip incision healing with steri-strips     Bladder program / urinary retention - schedule voids q 6-8 hrs. Check post-void residual every shift and as needed; in-and-out catheter if post-void residual is more than 400ml.     Bowel program - at risk for constipation as a side effect of opioids, other medications, impaired mobility, etc. MiraLAX daily for regularity, Marisela-Colace for stool softener. PRN MOM, bisacodyl suppository or tablets for constipation.            Time spent was 25 minutes with over 1/2 in direct patient care/examination, consultation and coordination of care.      Signed By: Oc Call MD     February 22, 2022

## 2022-02-23 LAB
ANION GAP SERPL CALC-SCNC: 5 MMOL/L (ref 7–16)
BUN SERPL-MCNC: 13 MG/DL (ref 8–23)
CALCIUM SERPL-MCNC: 9.1 MG/DL (ref 8.3–10.4)
CHLORIDE SERPL-SCNC: 94 MMOL/L (ref 98–107)
CO2 SERPL-SCNC: 33 MMOL/L (ref 21–32)
CREAT SERPL-MCNC: 0.7 MG/DL (ref 0.6–1)
ERYTHROCYTE [DISTWIDTH] IN BLOOD BY AUTOMATED COUNT: 19.2 % (ref 11.9–14.6)
GLUCOSE SERPL-MCNC: 85 MG/DL (ref 65–100)
HCT VFR BLD AUTO: 31.1 % (ref 35.8–46.3)
HGB BLD-MCNC: 10.1 G/DL (ref 11.7–15.4)
MCH RBC QN AUTO: 30.4 PG (ref 26.1–32.9)
MCHC RBC AUTO-ENTMCNC: 32.5 G/DL (ref 31.4–35)
MCV RBC AUTO: 93.7 FL (ref 79.6–97.8)
NRBC # BLD: 0 K/UL (ref 0–0.2)
PLATELET # BLD AUTO: 173 K/UL (ref 150–450)
PMV BLD AUTO: 10.4 FL (ref 9.4–12.3)
POTASSIUM SERPL-SCNC: 3.2 MMOL/L (ref 3.5–5.1)
RBC # BLD AUTO: 3.32 M/UL (ref 4.05–5.2)
SODIUM SERPL-SCNC: 132 MMOL/L (ref 136–145)
WBC # BLD AUTO: 6.8 K/UL (ref 4.3–11.1)

## 2022-02-23 PROCEDURE — 99232 SBSQ HOSP IP/OBS MODERATE 35: CPT | Performed by: PHYSICAL MEDICINE & REHABILITATION

## 2022-02-23 PROCEDURE — 77010033678 HC OXYGEN DAILY

## 2022-02-23 PROCEDURE — 94760 N-INVAS EAR/PLS OXIMETRY 1: CPT

## 2022-02-23 PROCEDURE — 97530 THERAPEUTIC ACTIVITIES: CPT

## 2022-02-23 PROCEDURE — 97116 GAIT TRAINING THERAPY: CPT

## 2022-02-23 PROCEDURE — 74011250637 HC RX REV CODE- 250/637: Performed by: PHYSICIAN ASSISTANT

## 2022-02-23 PROCEDURE — 74011000250 HC RX REV CODE- 250: Performed by: PHYSICAL MEDICINE & REHABILITATION

## 2022-02-23 PROCEDURE — 97535 SELF CARE MNGMENT TRAINING: CPT

## 2022-02-23 PROCEDURE — 80048 BASIC METABOLIC PNL TOTAL CA: CPT

## 2022-02-23 PROCEDURE — 74011250637 HC RX REV CODE- 250/637: Performed by: PHYSICAL MEDICINE & REHABILITATION

## 2022-02-23 PROCEDURE — 97110 THERAPEUTIC EXERCISES: CPT

## 2022-02-23 PROCEDURE — 74011636637 HC RX REV CODE- 636/637: Performed by: PHYSICAL MEDICINE & REHABILITATION

## 2022-02-23 PROCEDURE — 94640 AIRWAY INHALATION TREATMENT: CPT

## 2022-02-23 PROCEDURE — 85027 COMPLETE CBC AUTOMATED: CPT

## 2022-02-23 PROCEDURE — 36415 COLL VENOUS BLD VENIPUNCTURE: CPT

## 2022-02-23 PROCEDURE — 65310000000 HC RM PRIVATE REHAB

## 2022-02-23 RX ORDER — AMLODIPINE BESYLATE 5 MG/1
5 TABLET ORAL DAILY
Status: DISCONTINUED | OUTPATIENT
Start: 2022-02-23 | End: 2022-02-25 | Stop reason: HOSPADM

## 2022-02-23 RX ORDER — METOPROLOL TARTRATE 50 MG/1
50 TABLET ORAL EVERY 8 HOURS
Status: DISCONTINUED | OUTPATIENT
Start: 2022-02-23 | End: 2022-02-25 | Stop reason: HOSPADM

## 2022-02-23 RX ADMIN — ALBUTEROL SULFATE 2.5 MG: 2.5 SOLUTION RESPIRATORY (INHALATION) at 08:27

## 2022-02-23 RX ADMIN — GUAIFENESIN 1200 MG: 600 TABLET ORAL at 20:13

## 2022-02-23 RX ADMIN — PRASUGREL 10 MG: 10 TABLET, FILM COATED ORAL at 10:51

## 2022-02-23 RX ADMIN — TRAMADOL HYDROCHLORIDE 50 MG: 50 TABLET, COATED ORAL at 20:13

## 2022-02-23 RX ADMIN — ACETAMINOPHEN 650 MG: 325 TABLET ORAL at 05:22

## 2022-02-23 RX ADMIN — ATORVASTATIN CALCIUM 80 MG: 80 TABLET, FILM COATED ORAL at 22:00

## 2022-02-23 RX ADMIN — BUDESONIDE AND FORMOTEROL FUMARATE DIHYDRATE 2 PUFF: 80; 4.5 AEROSOL RESPIRATORY (INHALATION) at 20:00

## 2022-02-23 RX ADMIN — ESCITALOPRAM OXALATE 10 MG: 10 TABLET, FILM COATED ORAL at 08:32

## 2022-02-23 RX ADMIN — GUAIFENESIN 1200 MG: 600 TABLET ORAL at 08:33

## 2022-02-23 RX ADMIN — PREDNISONE 20 MG: 20 TABLET ORAL at 13:27

## 2022-02-23 RX ADMIN — ALBUTEROL SULFATE 2.5 MG: 2.5 SOLUTION RESPIRATORY (INHALATION) at 20:00

## 2022-02-23 RX ADMIN — Medication 1 AMPULE: at 22:29

## 2022-02-23 RX ADMIN — ACETAMINOPHEN 650 MG: 325 TABLET ORAL at 13:27

## 2022-02-23 RX ADMIN — PANTOPRAZOLE SODIUM 40 MG: 40 TABLET, DELAYED RELEASE ORAL at 08:33

## 2022-02-23 RX ADMIN — TRAMADOL HYDROCHLORIDE 50 MG: 50 TABLET, COATED ORAL at 05:27

## 2022-02-23 RX ADMIN — METOPROLOL TARTRATE 50 MG: 50 TABLET, FILM COATED ORAL at 05:22

## 2022-02-23 RX ADMIN — AMLODIPINE BESYLATE 5 MG: 5 TABLET ORAL at 08:33

## 2022-02-23 RX ADMIN — POLYETHYLENE GLYCOL 3350 17 G: 17 POWDER, FOR SOLUTION ORAL at 08:31

## 2022-02-23 RX ADMIN — BUDESONIDE AND FORMOTEROL FUMARATE DIHYDRATE 2 PUFF: 80; 4.5 AEROSOL RESPIRATORY (INHALATION) at 08:28

## 2022-02-23 RX ADMIN — METOPROLOL TARTRATE 50 MG: 50 TABLET, FILM COATED ORAL at 23:07

## 2022-02-23 RX ADMIN — ACETAMINOPHEN 650 MG: 325 TABLET ORAL at 23:06

## 2022-02-23 RX ADMIN — SENNOSIDES AND DOCUSATE SODIUM 2 TABLET: 8.6; 5 TABLET ORAL at 08:32

## 2022-02-23 RX ADMIN — TORSEMIDE 5 MG: 20 TABLET ORAL at 08:33

## 2022-02-23 RX ADMIN — ASPIRIN 81 MG: 81 TABLET ORAL at 08:33

## 2022-02-23 RX ADMIN — METHIMAZOLE 10 MG: 5 TABLET ORAL at 10:52

## 2022-02-23 RX ADMIN — OXYCODONE HYDROCHLORIDE 5 MG: 5 TABLET ORAL at 23:40

## 2022-02-23 RX ADMIN — PANTOPRAZOLE SODIUM 40 MG: 40 TABLET, DELAYED RELEASE ORAL at 17:24

## 2022-02-23 RX ADMIN — TIOTROPIUM BROMIDE INHALATION SPRAY 2 PUFF: 3.12 SPRAY, METERED RESPIRATORY (INHALATION) at 08:28

## 2022-02-23 RX ADMIN — OXYCODONE HYDROCHLORIDE 5 MG: 5 TABLET ORAL at 13:27

## 2022-02-23 RX ADMIN — OXYCODONE HYDROCHLORIDE 5 MG: 5 TABLET ORAL at 08:32

## 2022-02-23 RX ADMIN — LORAZEPAM 0.5 MG: 0.5 TABLET ORAL at 23:06

## 2022-02-23 RX ADMIN — METOPROLOL TARTRATE 50 MG: 50 TABLET, FILM COATED ORAL at 13:27

## 2022-02-23 NOTE — PROGRESS NOTES
Bassem Horowitz MD  Medical Director  [address] [facility], [address] [facility]  Tel: [phone]       [facility] PROGRESS NOTE    [facility]  Admit Date: 2/10/2022  Admit Diagnosis:   Femur fracture, right ([address] [S72.91XA]    Subjective     Patient seen and examined. Reporting a few day hx of back pain. Specifically , right flank area. Lidocaine patch not as effective. Hurts most with forward flexion. No radiation of pain. Orthostatic in therapies yesterday. 142/64 supine/ 90/58 standing and symptomatic , dizziness.  No significant change in HR 66-79    Objective:     Current Facility-Administered Medications   Medication Dose Route Frequency    amLODIPine (NORVASC) tablet 5 mg  5 mg Oral DAILY    metoprolol tartrate (LOPRESSOR) tablet 50 mg  50 mg Oral Q8H    predniSONE (DELTASONE) tablet 20 mg  20 mg Oral DAILY WITH LUNCH    [START ON 2/24/2022] predniSONE (DELTASONE) tablet 10 mg  10 mg Oral DAILY WITH LUNCH    torsemide (DEMADEX) tablet 5 mg  5 mg Oral DAILY    loperamide (IMODIUM) capsule 4 mg  4 mg Oral QID PRN    budesonide-formoterol (SYMBICORT) 80-4.5 mcg inhaler  2 Puff Inhalation BID RT    escitalopram oxalate (LEXAPRO) tablet 10 mg  10 mg Oral DAILY    oxyCODONE IR (ROXICODONE) tablet 5-10 mg  5-10 mg Oral Q4H PRN    albuterol (PROVENTIL VENTOLIN) nebulizer solution 2.5 mg  2.5 mg Nebulization BID RT    traMADoL (ULTRAM) tablet 50 mg  50 mg Oral Q6H PRN    nystatin (MYCOSTATIN) 100,000 unit/mL oral suspension 500,000 Units  500,000 Units Oral QID    0.9% sodium chloride infusion 250 mL  250 mL IntraVENous PRN    acetaminophen (TYLENOL) tablet 650 mg  650 mg Oral Q8H    senna-docusate (PERICOLACE) 8.6-50 mg per tablet 2 Tablet  2 Tablet Oral DAILY    sodium chloride (NS) flush 5-40 mL  5-40 mL IntraVENous PRN    alcohol 62% (NOZIN) nasal  1 Ampule  1 Ampule Topical Q12H    albuterol (PROVENTIL HFA, VENTOLIN HFA, PROAIR HFA) inhaler 2 Puff  2 Puff Inhalation Q6H PRN    albuterol-ipratropium (DUO-NEB) 2.5 MG-0.5 MG/3 ML  3 mL Nebulization Q6H PRN    aspirin delayed-release tablet 81 mg  81 mg Oral DAILY    atorvastatin (LIPITOR) tablet 80 mg  80 mg Oral QHS    bisacodyL (DULCOLAX) suppository 10 mg  10 mg Rectal DAILY PRN    guaiFENesin ER (MUCINEX) tablet 1,200 mg  1,200 mg Oral Q12H    lidocaine 4 % patch 1 Patch  1 Patch TransDERmal Q24H    LORazepam (ATIVAN) tablet 0.5 mg  0.5 mg Oral Q4H PRN    methIMAzole (TAPAZOLE) tablet 10 mg  10 mg Oral DAILY    nitroglycerin (NITROSTAT) tablet 0.4 mg  0.4 mg SubLINGual PRN    ondansetron (ZOFRAN ODT) tablet 4 mg  4 mg Oral Q6H PRN    pantoprazole (PROTONIX) tablet 40 mg  40 mg Oral BID    polyethylene glycol (MIRALAX) packet 17 g  17 g Oral DAILY    prasugreL (EFFIENT) tablet 10 mg  10 mg Oral DAILY    senna-docusate (PERICOLACE) 8.6-50 mg per tablet 1 Tablet  1 Tablet Oral BID PRN    tiotropium bromide (SPIRIVA RESPIMAT) 2.5 mcg /actuation  2 Puff Inhalation DAILY    traZODone (DESYREL) tablet 25 mg  25 mg Oral QHS PRN       Review of Systems:   Denies chest pain, shortness of breath, cough, headache, visual problems, abdominal pain, dysuria, calf pain. Pertinent positives are as noted in the HPI, ROS unremarkable otherwise. Visit Vitals  BP (!) 121/47 (BP 1 Location: Left upper arm)   Pulse 68   Temp 98.3 °F (36.8 °C)   Resp 18   SpO2 97%        Physical Exam:   General: Alert and age appropriately oriented. No acute cardiorespiratory distress. HEENT: Normocephalic, no scleral icterus. Oral mucosa moist without cyanosis. Lungs: Clear to auscultation bilaterally. Respiration even and unlabored. Heart: Regular rate and rhythm, S1, S2. No murmurs, clicks, rub or gallops. Abdomen: Soft, non-tender, not distended. Bowel sounds normoactive. No organomegaly. Genitourinary: Deferred.    Neuromuscular:      No gross focal motor deficits noted. Hip flexion 2+ due to pain from fx  msk right flank pain. Myofacial band palpated and very tender. Didn't tolerate manual massage to break m fibers up. No pain with UE movements but forward flexion very limited due to pain   Skin/extremity: No rashes, no erythema. No calf tenderness B LE. Right hip inc healing well  No edema                                                                              Functional Assessment:          Balance  Sitting - Static: Good (unsupported) (02/22/22 1800)  Sitting - Dynamic: Good (unsupported) (02/22/22 1800)  Standing - Static: Fair (with RW) (02/22/22 1800)  Standing - Dynamic : Impaired (02/22/22 1800)                     Obdulio Fortunato Fall Risk Assessment:  Mona Fortunato Fall Risk  Mobility: Ambulates or transfers with assist devices or assistance (02/23/22 0300)  Mobility Interventions: Communicate number of staff needed for ambulation/transfer;Patient to call before getting OOB; Strengthening exercises (ROM-active/passive); Utilize walker, cane, or other assistive device (02/23/22 0300)  Mentation: Alert, oriented x 3 (02/23/22 0300)  Medication: Patient receiving anticonvulsants, sedatives(tranquilizers), psychotropics or hypnotics, hypoglycemics, narcotics, sleep aids, antihypertensives, laxatives, or diuretics (02/23/22 0300)  Medication Interventions: Patient to call before getting OOB; Teach patient to arise slowly (02/23/22 0300)  Elimination: Needs assistance with toileting (02/23/22 0300)  Elimination Interventions: Call light in reach; Patient to call for help with toileting needs; Toilet paper/wipes in reach (02/23/22 0300)  Prior Fall History: Before admission in past 12 months _home or previous inpatient care) (02/23/22 0300)  History of Falls Interventions: Consult care management for discharge planning;Door open when patient unattended;Evaluate medications/consider consulting pharmacy (02/23/22 0300)  Total Score: 4 (02/23/22 0300)  Standard Fall Precautions:  Yes (02/23/22 0300)  High Fall Risk: Yes (02/23/22 0300)     Speech Assessment:  Aspiration Signs/Symptoms: None (02/14/22 1330)      Ambulation:  Gait  Distance (ft): 60 Feet (ft) (02/22/22 1800)  Assistive Device: Walker, rolling (02/22/22 1800)  Rail Use:  (with RW) (02/21/22 1700)     Labs/Studies:  No results found for this or any previous visit (from the past 67 hour(s)). Assessment:     Problem List as of 2/23/2022 Date Reviewed: 2/14/2022          Codes Class Noted - Resolved    Chest pain ICD-10-CM: R07.9  ICD-9-CM: 786.50  5/31/2019 - Present        EKG, abnormal ICD-10-CM: R94.31  ICD-9-CM: 794.31  2/15/2022 - Present        * (Principal) Femur fracture, right ([facility] ICD-10-CM: H11.51XV  ICD-9-CM: 821.00  2/11/2022 - Present        COPD with acute lower respiratory infection ([facility] ICD-10-CM: J44.0  ICD-9-CM: 496, 519.8  2/10/2022 - Present        Moderate to severe mitral regurgitation ICD-10-CM: I34.0  ICD-9-CM: 424.0  2/8/2022 - Present        Iron deficiency anemia due to chronic blood loss (Chronic) ICD-10-CM: D50.0  ICD-9-CM: 280.0  2/8/2022 - Present        Closed right hip fracture ([facility] ICD-10-CM: S72.001A  ICD-9-CM: 820.8  2/4/2022 - Present        Other fracture of right femur, initial encounter for closed fracture ([facility] ICD-10-CM: J72.1K0X  ICD-9-CM: 821.00  2/4/2022 - Present        DNR (do not resuscitate) ICD-10-CM: Z66  ICD-9-CM: V49.86  11/9/2021 - Present    Overview Signed 11/9/2021 12:25 PM by John Roman NP     POST form completed - DNR but full treatment, including intubation. No TRACH. No PEG.               Acute bilateral low back pain without sciatica ICD-10-CM: M54.50  ICD-9-CM: 724.2, 338.19  11/9/2021 - Present        Palliative care patient ICD-10-CM: Z51.5  ICD-9-CM: V66.7  7/14/2021 - Present        Ischemic heart disease, hx pci, cath/pci last 5/2019 ICD-10-CM: I25.9  ICD-9-CM: 414.9  2/24/2021 - Present        H/O carotid endarterectomy ICD-10-CM: Z98.890  ICD-9-CM: V45.89  9/21/2020 - Present        Right ear pain ICD-10-CM: H92.01  ICD-9-CM: 388.70  9/21/2020 - Present        Pulmonary mass ICD-10-CM: R91.8  ICD-9-CM: 786.6  2/11/2020 - Present        Centrilobular emphysema ([address] ICD-10-CM: J43.2  ICD-9-CM: 492.8  12/20/2019 - Present        Carotid stenosis ICD-10-CM: I65.29  ICD-9-CM: 433.10  12/3/2019 - Present        Carotid stenosis, right ICD-10-CM: I65.21  ICD-9-CM: 433.10  12/3/2019 - Present        Hypertensive urgency ICD-10-CM: I16.0  ICD-9-CM: 401.9  6/8/2019 - Present        Chronic respiratory failure with hypoxia (HCC) (Chronic) ICD-10-CM: J96.11  ICD-9-CM: 518.83, 799.02  6/8/2019 - Present        CAD S/P percutaneous coronary angioplasty ICD-10-CM: I25.10, Z98.61  ICD-9-CM: 414.01, V45.82  5/31/2019 - Present        Ventricular ectopy ICD-10-CM: I49.3  ICD-9-CM: 427.69  5/9/2017 - Present        Chronic anxiety ICD-10-CM: F41.9  ICD-9-CM: 300.00  4/27/2017 - Present        Hyperlipidemia (Chronic) ICD-10-CM: E78.5  ICD-9-CM: 272.4  10/10/2016 - Present        Essential hypertension, benign (Chronic) ICD-10-CM: I10  ICD-9-CM: 401.1  10/10/2016 - Present        Coronary artery disease involving native coronary artery of native heart without angina pectoris ICD-10-CM: I25.10  ICD-9-CM: 414.01  10/10/2016 - Present        Hypoxemia ICD-10-CM: R09.02  ICD-9-CM: 799.02  8/25/2013 - Present    Overview Signed 2/5/2014  9:08 AM by Lonny Rangel NP     FILIBERTO 8/2013. Pt had over 2 hours 1/2 hours of desaturations at night. O2 was ordered for her to start at 2L in September, but she stated she was feeling better than when FILIBERTO was done and refused O2. FILIBERTO 12/2013: not performed secondary to patient factors.               Acute respiratory failure with hypoxia [facility]) ICD-10-CM: J96.01  ICD-9-CM: 518.81  8/22/2013 - Present        COPD, very severe (HCC) (Chronic) ICD-10-CM: J44.9  ICD-9-CM: 496  8/20/2013 - Present        Personal history of tobacco use ICD-10-CM: L91.796  ICD-9-CM: V15.82  8/19/2013 - Present        History of MI (myocardial infarction) ICD-10-CM: I25.2  ICD-9-CM: 845  8/19/2013 - Present    Overview Addendum 8/30/2018  9:20 AM by Aishwarya Cortes MD     STEMI 8/19/13:  Angioplasty for in-stent stenosis to LAD             RESOLVED: Chronic obstructive pulmonary disease ([address] ICD-10-CM: J44.9  ICD-9-CM: 496  10/10/2016 - 8/30/2018        RESOLVED: Acute systolic heart failure ([address] ICD-10-CM: I50.21  ICD-9-CM: 428.21  8/22/2013 - 8/30/2018        RESOLVED: Pulmonary hemorrhage ICD-10-CM: R04.89  ICD-9-CM: 786.30  8/20/2013 - 6/25/2019        RESOLVED: CAD (coronary artery disease) (Chronic) ICD-10-CM: I25.10  ICD-9-CM: 414.00  8/19/2013 - 8/3/2021        RESOLVED: COPD (chronic obstructive pulmonary disease) (HCC) (Chronic) ICD-10-CM: J44.9  ICD-9-CM: 496  8/19/2013 - 8/20/2013              Right IT fracture s/p Gamma nail fixation (2/4/2022, Zechariah Lucio MD // St[facility] Mendoza post-op acute hypoxic respiratory failure, CAD s/p cardiac catheterization and stent placement     Plan / Recommendations / Medical Decision Making:      Daily physician / PA medical management:     Right hip fracture - PT / OT // WBAT; dyspnea is barrier to progress. Poor endurance.   -2/21 very slow but steady improvement     Hyperthyroidism - thyroid profile checked 2/7, found to be hyperthyroid. Started on methimazole.     CAD with acute chest pain - cardiac catheterization 2/7/2022, she is s/p PCI to LAD diagonal and to distal RCA. Severe MR; follow up with Cardiology.  Continue Effient.  -2/11 remote telemetry x 72hrs; if no chest pain and no events, will d/c telemetry  -2/12 chest pain this AM, tele monitor did not  a change ? indigestion ? Ranexa?  -2/14 no chest pain this AM, recognizes CP likely anxiety-related, no events on telemetry --> d/c telemetry  -2/15 CP did not appear cardiac in nature; prior to episode, she was having indigestion and wanted something other than what pharmacy provided; on Protonix BID already; ECG stable at 11 am, earlier patient was hypertensive, anxious; artifact on AM ECG  -2/17 no further CP     Chronic anemia with ABLA - Hgb 8.8< 9.2>8.9>7.1; stable; iron studies wnl.  -2/14 Hgb 8.9, low but stable  -2/22 recheck labs in a.m.; 2/23 pending     COPD, severe - continue PO steroids (40 mg x 3d, then 20mg daily x 3d, then 10mg x 2d then stop) and O2 supplementation; continue nebs; continue Mucinex  -2/11 on 1.5L O2 this AM, sats 96% at rest  -2/16 stable; insistent on having Symbicort; decrease prednisone to 20 mg; was not weaning as planned   -2/18 wean not yet initiated; decrease prednisone to 30mg at lunch x 3d then 20mg x 3d, etc, starting today  -2/21 starting prednisone 20mg daily x 3d today     Hypertension - BP fluctuating, managed medically. Hypotensive earlier today; nitro past and ACE inhibitor discontinued.   -2/12 /76 - 151/75, consider low dose ACE inhibitor  -2/14 normotensive x 48h, no changes  -2/15 /103, patient anxious at time; repeated 110/70  -2/16 /81 with anxiety; on Norvasc 5mg, Lopressor 50mg q6h; increase Norvasc to 7.5mg  -2/17 BP poorly controlled, Dr. Byron Coburn following; consider increasing Norvasc to 10mg daily; patient reports being on 10mg Demadex at home due to her edema; will restart  -2/18 /61 this AM, much better BP controlled with diuretic on board  -2/20 HR and BP acceptable, 112/70  -2/21 /61, HR stays in 60s; continue current regimen  -2/23 orthostatic hypotension yesterday. Has diuresed significantly. Likely dec in vascular pressure. Will dec Norvasc to 5mg from 7.5 and dec Lopressor to 50 mg q 8hrs rather than q 6. Cont low dose demadex     Hyperlipidemia - continue Zetia / Lipitor 80mg daily.     Pain management - will require regular pain assessment and comprehensive pain management. Has Tylenol and oxycodone PRN. -2/23 chronic back pain. Worse past few days.  Has louis 5-10mg q 4hrs ordered. Using lidocaine patch but biofreeze may be more effective given msk component. Cont modalities, consider dry needling      Pneumonia prophylaxis - incentive spirometer every hour while awake. On Levaquin until ; CXR  without acute findings.      DVT risk / DVT prophylaxis - daily physician / PA exam to assess as patient is at increased risk for of thromboembolism. Mobilize as tolerated. Sequential pneumatic compression devices (SCDs) when in bed; thigh-high or knee-high thromboembolic deterrent hose when out of bed. On Effient.     GI prophylaxis - resume PPI. At times may need additional antacids, Maalox prn.     Depression - ? patient with anxiety that is apparent. PRN benzodiazepines.  - pt is very realistic about her health and decline. She speaks calmly about having her  arrangements made and that it \"is what it is. \" Very much at peace. Encouraged her to participate and allow self to heal in the best way possible  - anxiety overshadowing her health overall, open to trying antidepressant / anxiolytic, has used Lexapro in past but does not recall +/- effects; revisit tomorrow  - agreeable to Lexapro 10mg daily  - doing so much better, decreased anxiety     General skin care / wound prevention - monitor right incision and general skin wound status daily per staff and physician / PA. At risk for failure due to impaired mobility  - POD #7 incision c/d/i  - d/c staples   - right hip incision healing with steri-strips     Bladder program / urinary retention - schedule voids q 6-8 hrs. Check post-void residual every shift and as needed; in-and-out catheter if post-void residual is more than 400ml.     Bowel program - at risk for constipation as a side effect of opioids, other medications, impaired mobility, etc. MiraLAX daily for regularity, Marisela-Colace for stool softener.  PRN MOM, bisacodyl suppository or tablets for constipation.       Time spent was 25 minutes with over 1/2 in direct patient care/examination, consultation and coordination of care.      Signed By: Cuate Bass MD     February 23, 2022

## 2022-02-23 NOTE — PROGRESS NOTES
PHYSICAL THERAPY DAILY NOTE  Time In: 3149  Time Out: 1275  Patient Seen For: PM;Gait training;Patient education; Therapeutic exercise;Transfer training; Other (see progress notes)    Subjective: Patient reporting she feels a little better. Reports her low back was stopped hurting but reporting at end of treatment slow onset of low back pain. Reports right hip pain is less. Reports she took pain medication about an hour before PT         Objective:Vital Signs:  Patient Vitals for the past 12 hrs:   Temp Pulse Resp BP SpO2   02/23/22 1629 98.4 °F (36.9 °C) 61 16 (!) 107/53 97 %   02/23/22 1246 97.3 °F (36.3 °C) 63 16 (!) 106/55 96 %   02/23/22 1150 -- 63 -- (!) 130/56 --   02/23/22 0831 -- 65 -- 134/63 --   02/23/22 0828 -- -- -- -- 95 %   02/23/22 0521 98.3 °F (36.8 °C) 68 -- (!) 121/47 --     Pain level:3 to 7out of 10  Pain location:right hip, right side of low back  Pain interventions:Medication per order, rest, positioning,relaxation, body mechanics, WBAT RLE, biofreeze massage to low back      Patient education:Bed mobility training,transfer training, gait training, balance training,fall precautions, body mechanics,activity pacing, energy conservation,WBAT RLE precautions,Patient verbalizing understanding and demonstrating understanding of patient education. Recommend follow up education.     Interdisciplinary Communication:spoke with RN regarding pain medication schedule    Other (comment) (respiratory and hip)  GROSS ASSESSMENT Daily Assessment     NA       COGNITION Daily Assessment    Alert, able to follow commands,cooperating,participating, motivated, anxious regarding pain in RLE         BED/MAT MOBILITY Daily Assessment   Increased time and effort to complete        Rolling Right : 0 (Not tested)  Rolling Left : 0 (Not tested)  Supine to Sit : 6 (Modified independent)  Sit to Supine : 6 (Modified independent)       TRANSFERS Daily Assessment   Increased time and effort to complete     Transfer Type: SPT with walker  Transfer Assistance : 6 (Modified independent)  Sit to Stand Assistance: Modified independent  Car Transfers: Not tested       GAIT Daily Assessment    Amount of Assistance: 5 (Supervision/setup)  Distance (ft): 60 Feet (ft)  Assistive Device: Walker, rolling   Gait training with RW demonstrating slow start/stop step to antalgic gait pattern with decreased stance phase  RLE, decreased step length, decreased step clearance and flexed posture. Verbal and visual cues to correct gait deviations. No loss of balance. Progressing towards partial step through gait pattern with improved stance phase RLE and improved step length LLE      STEPS or STAIRS Daily Assessment    NT       BALANCE Daily Assessment   No loss of balance during gait or transfer training Sitting - Static: Good (unsupported)  Sitting - Dynamic: Good (unsupported)  Standing - Static: Fair (with RW)  Standing - Dynamic : Impaired       WHEELCHAIR MOBILITY Daily Assessment    Curbs/Ramps Assist Required (FIM Score): 0 (Not tested)  Wheelchair Setup Assist Required : 0 (Not tested)       LOWER EXTREMITY EXERCISES Daily Assessment   Increased time and effort to complete with multiple and frequent rest breaks. Cues for correct form   Extremity: Right  Exercise Type #1: Supine lower extremity strengthening  Sets Performed: 3  Reps Performed: 10  Level of Assist: Supervision (set up assist with cues)     SUPINE EXERCISES Sets Reps Comments   Ankle Pumps 3 10    Quad Sets 3 10    Glut Sets 3 10    Heel Slides 3 10    Hip Abduction 3 10 With skate   Short Arc Quad 3 10             Assessment: Improved tolerance to treatment with no signs of orthostatic hypotension. Gait pattern and gait distance slowly improving. Gait distance limited due to decreased endurance and right hip pain. Patient remained in  room at end of treatment and remained up in recliner with LEs elevated and with needs in reach. 02 at 1.5 lpm    Plan of Care: Continue with POC and progress as tolerated.      Mary Carr, PT  2/23/2022

## 2022-02-23 NOTE — PROGRESS NOTES
Team conference today with [address] set for Friday, 2/25. BSN, CM met with pt at bedside and pt agreeable with date. HH needs include PT/OT/aide, referral already placed as requested to [facility] and liaison aware no RN service needed now per Dr. Hever Motta. DME needs include w/c and provided by Chris already at bedside. Pt purchased RW own own. Pt declined hospital bed and plans to continue using recliner at home. Family training set for tomorrow at 0830.  CM to continue to follow and monitor for any further needs.

## 2022-02-23 NOTE — PROGRESS NOTES
OT Daily Note  Time In 0751   Time Out 0826     Pain: Pt had more pain in lower back. Pt was able to tolerate activity. Functional Mobility      Score Comments   Sit to Stand 4: Supervision or touching A S   Transfer Assist 4: Supervision or touching A Transfer Type: SPT   Equipment: Rolling Walker   Comments: S        Self-Care   Pt brushed teeth with I.      Strengthening   Pt completed 5 minutes on the ergometer frontwards and backwards with moderate resistance to increase UB strength and activity tolerance for integration into functional mobility. Attempted to isolate back pain, but was unsuccessful. Education   Improvements made     Interdisciplinary Communication: PT Roslyn Cloud on pt's care    Plan: Continue with POC. Pt was left in recliner with all needs within reach.      Adria Mohan OTR/L  2/23/2022

## 2022-02-23 NOTE — PROGRESS NOTES
OT Daily Note  Time In 1311   Time Out 1345     Pain: Pt reported 10/10 pain. ALINE Goldberg adminster lidocaine patch and pain medication. Pt refused ice pack. Pt's daughter was having a procedure done which may have produced anxiety that expressed as pain. Functional Mobility      Score Comments   Sit to Stand 4: Supervision or touching A S   Transfer Assist 4: Supervision or touching A Transfer Type: SPT   Equipment: N/A   Comments: S   Pt walked across room 2x with cueing for O2 management. Pt stood for 5 minutes with a one handed activity. Education   Pain management strategies. Interdisciplinary Communication: ALINE Goldberg on pain    Plan: Continue with POC. Pt was left in recliner with all needs within reach.      Gabriel Martinez OTR/L  2/23/2022

## 2022-02-23 NOTE — PROGRESS NOTES
PHYSICAL THERAPY DAILY NOTE  Time In: 1116  Time Out: 4915  Patient Seen For: AM;Gait training;Patient education; Therapeutic exercise;Transfer training; Other (see progress notes)    Subjective: Patient reporting she had her pain medication about 60 minutes ago. Reports she is hurting more this AM and she is still having dizziness in standing. Reports her low back is hurting more than her right hip         Objective:Vital Signs:02 at 1.5 lpm.  02 sat 95 to 98%  Patient Vitals for the past 12 hrs:   Temp Pulse Resp BP SpO2   02/23/22 1246 97.3 °F (36.3 °C) 63 16 (!) 106/55 96 %   02/23/22 0831 -- 65 -- 134/63 --   02/23/22 0828 -- -- -- -- 95 %   02/23/22 0521 98.3 °F (36.8 °C) 68 -- (!) 121/47 --     Pain level:4 to 9 out of 10  Pain location:right hip, right side of low back  Pain interventions:Medication per order, rest, positioning,relaxation, body mechanics, WBAT RLE, biofreeze massage to low back      Patient education:Bed mobility training,transfer training, gait training, balance training,fall precautions, body mechanics,activity pacing, energy conservation,WBAT RLE precautions,Patient verbalizing understanding and demonstrating understanding of patient education. Recommend follow up education.     Interdisciplinary Communication:spoke with RN regarding pain medication schedule    Other (comment) (respiratory and hip)  GROSS ASSESSMENT Daily Assessment     NA       COGNITION Daily Assessment    Alert, able to follow commands,cooperating,participating, motivated, anxious regarding pain in RLE         BED/MAT MOBILITY Daily Assessment   Increased time and effort to complete with cues for body mechanics       Rolling Right : 0 (Not tested)  Rolling Left : 0 (Not tested)  Supine to Sit : 5 (Supervision)  Sit to Supine : 5 (Supervision)       TRANSFERS Daily Assessment   Increased time and effort to complete with cues for body mechanics     Transfer Type: SPT with walker  Transfer Assistance : 5 (Supervision/setup)  Sit to Stand Assistance: Supervision  Car Transfers: Not tested       GAIT Daily Assessment    Amount of Assistance: 5 (Supervision/setup)  Distance (ft): 50 Feet (ft)  Assistive Device: Walker, rolling   Gait training with RW demonstrating slow start/stop step to antalgic gait pattern with decreased stance phase  RLE, decreased step length, decreased step clearance and flexed posture. Verbal and visual cues to correct gait deviations. No loss of balance. Progressing towards partial step through gait pattern with improved stance phase RLE and improved step length LLE      STEPS or STAIRS Daily Assessment    NT       BALANCE Daily Assessment   No loss of balance during gait or transfer training Sitting - Static: Good (unsupported)  Sitting - Dynamic: Good (unsupported)  Standing - Static: Fair (with RW)  Standing - Dynamic : Impaired       WHEELCHAIR MOBILITY Daily Assessment    Curbs/Ramps Assist Required (FIM Score): 0 (Not tested)  Wheelchair Setup Assist Required : 0 (Not tested)       LOWER EXTREMITY EXERCISES Daily Assessment   Increased time and effort to complete with multiple and frequent rest breaks. Cues for correct form   Extremity: Right  Exercise Type #1: Supine lower extremity strengthening  Sets Performed: 3  Reps Performed: 10  Level of Assist: Supervision (set up assist with cues)     SUPINE EXERCISES Sets Reps Comments   Ankle Pumps 3 10    Quad Sets 3 10    Glut Sets 3 10    Heel Slides 3 10    Hip Abduction 3 10 With skate   Short Arc Quad 3 10             Assessment: Cont to have orthostatic hypotension limiting but improved side yesterday. Increased pain with decreased tolerance to treatment requiring multiple and frequent rest breaks for pain control       Patient remained in  room at end of treatment and remained up in recliner with LEs elevated and with needs in reach. 02 at 1.5 lpm    Plan of Care: Continue with POC and progress as tolerated.      Laura Springer, PT  2/23/2022

## 2022-02-24 PROCEDURE — 74011250637 HC RX REV CODE- 250/637: Performed by: PHYSICAL MEDICINE & REHABILITATION

## 2022-02-24 PROCEDURE — 97535 SELF CARE MNGMENT TRAINING: CPT

## 2022-02-24 PROCEDURE — 94640 AIRWAY INHALATION TREATMENT: CPT

## 2022-02-24 PROCEDURE — 97530 THERAPEUTIC ACTIVITIES: CPT

## 2022-02-24 PROCEDURE — 97110 THERAPEUTIC EXERCISES: CPT

## 2022-02-24 PROCEDURE — 77010033678 HC OXYGEN DAILY

## 2022-02-24 PROCEDURE — 97116 GAIT TRAINING THERAPY: CPT

## 2022-02-24 PROCEDURE — 74011250637 HC RX REV CODE- 250/637: Performed by: PHYSICIAN ASSISTANT

## 2022-02-24 PROCEDURE — 74011636637 HC RX REV CODE- 636/637: Performed by: PHYSICAL MEDICINE & REHABILITATION

## 2022-02-24 PROCEDURE — 65310000000 HC RM PRIVATE REHAB

## 2022-02-24 PROCEDURE — 94760 N-INVAS EAR/PLS OXIMETRY 1: CPT

## 2022-02-24 PROCEDURE — 99232 SBSQ HOSP IP/OBS MODERATE 35: CPT | Performed by: PHYSICAL MEDICINE & REHABILITATION

## 2022-02-24 PROCEDURE — 74011000250 HC RX REV CODE- 250: Performed by: PHYSICAL MEDICINE & REHABILITATION

## 2022-02-24 RX ORDER — OXYCODONE HYDROCHLORIDE 5 MG/1
5-10 TABLET ORAL
Qty: 36 TABLET | Refills: 0 | Status: SHIPPED | OUTPATIENT
Start: 2022-02-24 | End: 2022-02-27

## 2022-02-24 RX ORDER — TRAMADOL HYDROCHLORIDE 50 MG/1
50 TABLET ORAL
Qty: 20 TABLET | Refills: 0 | Status: SHIPPED | OUTPATIENT
Start: 2022-02-24 | End: 2022-02-28

## 2022-02-24 RX ORDER — ACETAMINOPHEN 325 MG/1
650 TABLET ORAL EVERY 8 HOURS
Qty: 40 TABLET | Status: SHIPPED | OUTPATIENT
Start: 2022-02-24 | End: 2022-03-31

## 2022-02-24 RX ORDER — LORAZEPAM 0.5 MG/1
0.5 TABLET ORAL
Qty: 15 TABLET | Refills: 0 | Status: ON HOLD | OUTPATIENT
Start: 2022-02-24 | End: 2022-02-28 | Stop reason: SDUPTHER

## 2022-02-24 RX ORDER — AMLODIPINE BESYLATE 5 MG/1
5 TABLET ORAL DAILY
Qty: 30 TABLET | Refills: 0 | Status: SHIPPED | OUTPATIENT
Start: 2022-02-25 | End: 2022-02-28

## 2022-02-24 RX ORDER — PRASUGREL 10 MG/1
10 TABLET, FILM COATED ORAL DAILY
Qty: 30 TABLET | Refills: 0 | Status: SHIPPED | OUTPATIENT
Start: 2022-02-24 | End: 2022-02-28

## 2022-02-24 RX ORDER — METHIMAZOLE 10 MG/1
10 TABLET ORAL DAILY
Qty: 30 TABLET | Refills: 0 | Status: ON HOLD | OUTPATIENT
Start: 2022-02-24 | End: 2022-03-30 | Stop reason: SDUPTHER

## 2022-02-24 RX ORDER — POTASSIUM CHLORIDE 20 MEQ/1
20 TABLET, EXTENDED RELEASE ORAL 2 TIMES DAILY
Qty: 6 TABLET | Refills: 0 | Status: SHIPPED | OUTPATIENT
Start: 2022-02-24 | End: 2022-02-28

## 2022-02-24 RX ORDER — AMOXICILLIN 250 MG
2 CAPSULE ORAL DAILY
Qty: 60 TABLET | Status: SHIPPED | OUTPATIENT
Start: 2022-02-25 | End: 2022-03-31

## 2022-02-24 RX ORDER — LIDOCAINE 4 G/100G
PATCH TOPICAL
Qty: 30 PATCH | Refills: 0 | Status: SHIPPED | OUTPATIENT
Start: 2022-02-24 | End: 2022-03-18

## 2022-02-24 RX ORDER — ESCITALOPRAM OXALATE 10 MG/1
10 TABLET ORAL DAILY
Qty: 30 TABLET | Refills: 1 | Status: ON HOLD | OUTPATIENT
Start: 2022-02-25 | End: 2022-03-30 | Stop reason: SDUPTHER

## 2022-02-24 RX ORDER — TRAZODONE HYDROCHLORIDE 50 MG/1
25 TABLET ORAL
Qty: 15 TABLET | Refills: 0 | Status: SHIPPED | OUTPATIENT
Start: 2022-02-24 | End: 2022-03-31

## 2022-02-24 RX ORDER — PREDNISONE 10 MG/1
10 TABLET ORAL
Qty: 1 TABLET | Refills: 0 | Status: SHIPPED | OUTPATIENT
Start: 2022-02-25 | End: 2022-02-26

## 2022-02-24 RX ORDER — POTASSIUM CHLORIDE 20 MEQ/1
40 TABLET, EXTENDED RELEASE ORAL DAILY
Status: DISCONTINUED | OUTPATIENT
Start: 2022-02-24 | End: 2022-02-25 | Stop reason: HOSPADM

## 2022-02-24 RX ORDER — POLYETHYLENE GLYCOL 3350 17 G/17G
17 POWDER, FOR SOLUTION ORAL DAILY
Qty: 289 G | Status: SHIPPED
Start: 2022-02-24 | End: 2022-03-31

## 2022-02-24 RX ADMIN — ACETAMINOPHEN 650 MG: 325 TABLET ORAL at 16:13

## 2022-02-24 RX ADMIN — SENNOSIDES AND DOCUSATE SODIUM 2 TABLET: 8.6; 5 TABLET ORAL at 08:17

## 2022-02-24 RX ADMIN — POTASSIUM CHLORIDE 40 MEQ: 20 TABLET, EXTENDED RELEASE ORAL at 12:09

## 2022-02-24 RX ADMIN — OXYCODONE HYDROCHLORIDE 5 MG: 5 TABLET ORAL at 16:13

## 2022-02-24 RX ADMIN — POLYETHYLENE GLYCOL 3350 17 G: 17 POWDER, FOR SOLUTION ORAL at 08:18

## 2022-02-24 RX ADMIN — METOPROLOL TARTRATE 50 MG: 50 TABLET, FILM COATED ORAL at 16:13

## 2022-02-24 RX ADMIN — ALBUTEROL SULFATE 2.5 MG: 2.5 SOLUTION RESPIRATORY (INHALATION) at 08:12

## 2022-02-24 RX ADMIN — ALBUTEROL SULFATE 2.5 MG: 2.5 SOLUTION RESPIRATORY (INHALATION) at 20:00

## 2022-02-24 RX ADMIN — PRASUGREL 10 MG: 10 TABLET, FILM COATED ORAL at 09:00

## 2022-02-24 RX ADMIN — ATORVASTATIN CALCIUM 80 MG: 80 TABLET, FILM COATED ORAL at 22:15

## 2022-02-24 RX ADMIN — ALBUTEROL SULFATE 2.5 MG: 2.5 SOLUTION RESPIRATORY (INHALATION) at 14:50

## 2022-02-24 RX ADMIN — AMLODIPINE BESYLATE 5 MG: 5 TABLET ORAL at 08:16

## 2022-02-24 RX ADMIN — GUAIFENESIN 1200 MG: 600 TABLET ORAL at 22:16

## 2022-02-24 RX ADMIN — TRAMADOL HYDROCHLORIDE 50 MG: 50 TABLET, COATED ORAL at 13:31

## 2022-02-24 RX ADMIN — GUAIFENESIN 1200 MG: 600 TABLET ORAL at 08:16

## 2022-02-24 RX ADMIN — ASPIRIN 81 MG: 81 TABLET ORAL at 08:16

## 2022-02-24 RX ADMIN — OXYCODONE HYDROCHLORIDE 5 MG: 5 TABLET ORAL at 08:14

## 2022-02-24 RX ADMIN — PANTOPRAZOLE SODIUM 40 MG: 40 TABLET, DELAYED RELEASE ORAL at 08:16

## 2022-02-24 RX ADMIN — PANTOPRAZOLE SODIUM 40 MG: 40 TABLET, DELAYED RELEASE ORAL at 18:27

## 2022-02-24 RX ADMIN — METHIMAZOLE 10 MG: 5 TABLET ORAL at 08:15

## 2022-02-24 RX ADMIN — LORAZEPAM 0.5 MG: 0.5 TABLET ORAL at 22:15

## 2022-02-24 RX ADMIN — ACETAMINOPHEN 650 MG: 325 TABLET ORAL at 06:25

## 2022-02-24 RX ADMIN — OXYCODONE HYDROCHLORIDE 5 MG: 5 TABLET ORAL at 12:09

## 2022-02-24 RX ADMIN — ESCITALOPRAM OXALATE 10 MG: 10 TABLET, FILM COATED ORAL at 08:16

## 2022-02-24 RX ADMIN — TIOTROPIUM BROMIDE INHALATION SPRAY 2 PUFF: 3.12 SPRAY, METERED RESPIRATORY (INHALATION) at 08:13

## 2022-02-24 RX ADMIN — OXYCODONE HYDROCHLORIDE 5 MG: 5 TABLET ORAL at 22:21

## 2022-02-24 RX ADMIN — BUDESONIDE AND FORMOTEROL FUMARATE DIHYDRATE 2 PUFF: 80; 4.5 AEROSOL RESPIRATORY (INHALATION) at 08:12

## 2022-02-24 RX ADMIN — TORSEMIDE 5 MG: 20 TABLET ORAL at 08:16

## 2022-02-24 RX ADMIN — ACETAMINOPHEN 650 MG: 325 TABLET ORAL at 22:16

## 2022-02-24 RX ADMIN — PREDNISONE 10 MG: 10 TABLET ORAL at 12:09

## 2022-02-24 NOTE — PROGRESS NOTES
PHYSICAL THERAPY DAILY NOTE  Time In: 1974  Time Out: 0011  Patient Seen For: AM;Family training;Gait training;Patient education;Transfer training; Other (see progress notes)    Subjective: Patient reporting her significant other is here for family training again. Objective:Vital Signs:  Patient Vitals for the past 12 hrs:   Temp Pulse Resp BP SpO2   02/24/22 0813 -- -- -- -- 95 %   02/24/22 0738 98.1 °F (36.7 °C) 60 18 128/66 95 %   02/24/22 0649 98.3 °F (36.8 °C) 61 16 (!) 124/49 97 %     Pain level:2 to 4 out of 10  Pain location:right hip  Pain interventions:Medication per order, rest, positioning,relaxation, WBAT RLE, body mechanics      Patient education:,transfer training, gait training, balance training,fall precautions, body mechanics,activity pacing,  w/c parts management, Patient verbalizing understanding and demonstrating understanding of patient education. Recommend follow up education. Interdisciplinary Communication:spoke with OT regarding family training    Other (comment) (respiratory and hip)  GROSS ASSESSMENT Daily Assessment     NA       COGNITION Daily Assessment    Alert, able to follow commands,cooperating,participating, motivated,          BED/MAT MOBILITY Daily Assessment    Rolling Right : 0 (Not tested)  Rolling Left : 0 (Not tested)  Supine to Sit : 0 (Not tested)  Sit to Supine : 0 (Not tested)       TRANSFERS Daily Assessment   Increased time and effort to complete with cues for body mechanics  Instructed patient's family member on how to assist patient with SPT with RW and car transfers as noted.      Transfer Type: SPT with walker  Transfer Assistance : 6 (Modified independent)  Sit to Stand Assistance: Modified independent (with RW)  Car Transfers:  (SBA with SPT with RW)  Car Type: patient's [employer]       GAIT Daily Assessment    Amount of Assistance: 5 (Supervision/setup)  Distance (ft): 60 Feet (ft)  Assistive Device: Walker, rolling   Gait training facilitating cont partial step through gait pattern with improved stance phase RLE with improved step length LLE. STEPS or STAIRS Daily Assessment    Steps/Stairs Ambulated (#): 0  Level of Assist : 0 (Not tested)       BALANCE Daily Assessment   Instructed patient's family member on how to assist patient with balance during SPT with RW     Sitting - Static: Good (unsupported)  Sitting - Dynamic: Good (unsupported)  Standing - Static: Fair (with RW)  Standing - Dynamic : Impaired       WHEELCHAIR MOBILITY Daily Assessment   Instructed patient's family member on how to assist patient with w/c parts management including getting w/c in and out of car. Made recommendations for Using blanket method for getting w/c in and out of car. Family member able to complete loading/unloading w/c with min assist and cues from PT   Curbs/Ramps Assist Required (FIM Score): 0 (Not tested)  Wheelchair Setup Assist Required : 5 (Supervision/setup)  Wheelchair Management: Manages left brake;Manages right brake;Manages left footrest;Manages right footrest       LOWER EXTREMITY EXERCISES Daily Assessment     NA          Assessment: Patient and family member verbalizing and demonstrating understanding of patient /family education/training noted above         Patient returned to room at end of treatment and remained up in recliner with LEs elevated and with needs in reach. 02 at 1.5 lpm  Plan of Care: Continue with POC and progress as tolerated.      Morenita Cervantes, PT  2/24/2022

## 2022-02-24 NOTE — PROGRESS NOTES
Yoan Solano MD  Medical Director  [address] [facility], [address] [facility]  Tel: [phone]       [facility] PROGRESS NOTE    [facility]  Admit Date: 2/10/2022  Admit Diagnosis:   Femur fracture, right ([address] [S72.91XA]    Subjective     Patient seen and examined. Doing well. Back pain persist. No cough. No sob. States that her diaphragm hurts.     Objective:     Current Facility-Administered Medications   Medication Dose Route Frequency    amLODIPine (NORVASC) tablet 5 mg  5 mg Oral DAILY    metoprolol tartrate (LOPRESSOR) tablet 50 mg  50 mg Oral Q8H    predniSONE (DELTASONE) tablet 10 mg  10 mg Oral DAILY WITH LUNCH    torsemide (DEMADEX) tablet 5 mg  5 mg Oral DAILY    loperamide (IMODIUM) capsule 4 mg  4 mg Oral QID PRN    budesonide-formoterol (SYMBICORT) 80-4.5 mcg inhaler  2 Puff Inhalation BID RT    escitalopram oxalate (LEXAPRO) tablet 10 mg  10 mg Oral DAILY    oxyCODONE IR (ROXICODONE) tablet 5-10 mg  5-10 mg Oral Q4H PRN    albuterol (PROVENTIL VENTOLIN) nebulizer solution 2.5 mg  2.5 mg Nebulization BID RT    traMADoL (ULTRAM) tablet 50 mg  50 mg Oral Q6H PRN    nystatin (MYCOSTATIN) 100,000 unit/mL oral suspension 500,000 Units  500,000 Units Oral QID    0.9% sodium chloride infusion 250 mL  250 mL IntraVENous PRN    acetaminophen (TYLENOL) tablet 650 mg  650 mg Oral Q8H    senna-docusate (PERICOLACE) 8.6-50 mg per tablet 2 Tablet  2 Tablet Oral DAILY    sodium chloride (NS) flush 5-40 mL  5-40 mL IntraVENous PRN    alcohol 62% (NOZIN) nasal  1 Ampule  1 Ampule Topical Q12H    albuterol (PROVENTIL HFA, VENTOLIN HFA, PROAIR HFA) inhaler 2 Puff  2 Puff Inhalation Q6H PRN    albuterol-ipratropium (DUO-NEB) 2.5 MG-0.5 MG/3 ML  3 mL Nebulization Q6H PRN    aspirin delayed-release tablet 81 mg  81 mg Oral DAILY    atorvastatin (LIPITOR) tablet 80 mg  80 mg Oral QHS    bisacodyL (DULCOLAX) suppository 10 mg  10 mg Rectal DAILY PRN    guaiFENesin ER (MUCINEX) tablet 1,200 mg  1,200 mg Oral Q12H    lidocaine 4 % patch 1 Patch  1 Patch TransDERmal Q24H    LORazepam (ATIVAN) tablet 0.5 mg  0.5 mg Oral Q4H PRN    methIMAzole (TAPAZOLE) tablet 10 mg  10 mg Oral DAILY    nitroglycerin (NITROSTAT) tablet 0.4 mg  0.4 mg SubLINGual PRN    ondansetron (ZOFRAN ODT) tablet 4 mg  4 mg Oral Q6H PRN    pantoprazole (PROTONIX) tablet 40 mg  40 mg Oral BID    polyethylene glycol (MIRALAX) packet 17 g  17 g Oral DAILY    prasugreL (EFFIENT) tablet 10 mg  10 mg Oral DAILY    senna-docusate (PERICOLACE) 8.6-50 mg per tablet 1 Tablet  1 Tablet Oral BID PRN    tiotropium bromide (SPIRIVA RESPIMAT) 2.5 mcg /actuation  2 Puff Inhalation DAILY    traZODone (DESYREL) tablet 25 mg  25 mg Oral QHS PRN       Review of Systems:   Denies chest pain, shortness of breath, cough, headache, visual problems, abdominal pain, dysuria, calf pain. Pertinent positives are as noted in the HPI, ROS unremarkable otherwise. Visit Vitals  /66   Pulse 60   Temp 98.1 °F (36.7 °C)   Resp 18   Ht 5' 8\" (1.727 m)   SpO2 95%   BMI 23.63 kg/m²        Physical Exam:   General: Alert and age appropriately oriented. No acute cardiorespiratory distress. HEENT: Normocephalic, no scleral icterus. Oral mucosa moist without cyanosis. Lungs: Clear to auscultation bilaterally. Respiration even and unlabored. Heart: Regular rate and rhythm, S1, S2. No murmurs, clicks, rub or gallops. Abdomen: Soft, non-tender, not distended. Bowel sounds normoactive. No organomegaly. Genitourinary: Deferred. Neuromuscular:      No gross focal motor deficits noted. Hip flexion 2+ due to pain from fx  msk right flank pain; less m spasm this morning     Skin/extremity: No rashes, no erythema. No calf tenderness B LE.   No edema                                                                              Functional Assessment: Balance  Sitting - Static: Good (unsupported) (02/23/22 1700)  Sitting - Dynamic: Good (unsupported) (02/23/22 1700)  Standing - Static: Fair (with RW) (02/23/22 1700)  Standing - Dynamic : Impaired (02/23/22 1700)                     Clearence Skates Fall Risk Assessment:  Clearence Skhakeem Fall Risk  Mobility: Ambulates or transfers with assist devices or assistance (02/24/22 0739)  Mobility Interventions: Utilize walker, cane, or other assistive device (02/23/22 2001)  Mentation: Alert, oriented x 3 (02/23/22 2001)  Medication: Patient receiving anticonvulsants, sedatives(tranquilizers), psychotropics or hypnotics, hypoglycemics, narcotics, sleep aids, antihypertensives, laxatives, or diuretics (02/23/22 2001)  Medication Interventions: Evaluate medications/consider consulting pharmacy; Patient to call before getting OOB (02/23/22 0756)  Elimination: Needs assistance with toileting (02/23/22 2001)  Elimination Interventions: Call light in reach; Patient to call for help with toileting needs (02/23/22 0756)  Prior Fall History: Before admission in past 12 months _home or previous inpatient care) (02/23/22 2001)  History of Falls Interventions: Consult care management for discharge planning;Door open when patient unattended;Evaluate medications/consider consulting pharmacy (02/23/22 0756)  Total Score: 4 (02/23/22 2001)  Standard Fall Precautions: Yes (02/23/22 0300)  High Fall Risk: Yes (02/23/22 2001)     Speech Assessment:  Aspiration Signs/Symptoms: None (02/14/22 1330)      Ambulation:  Gait  Distance (ft): 60 Feet (ft) (02/23/22 1700)  Assistive Device: Walker, rolling (02/23/22 1700)  Rail Use:  (with RW) (02/21/22 1700)     Labs/Studies:  Recent Results (from the past 72 hour(s))   CBC W/O DIFF    Collection Time: 02/23/22  8:49 AM   Result Value Ref Range    WBC 6.8 4.3 - 11.1 K/uL    RBC 3.32 (L) 4.05 - 5.2 M/uL    HGB 10.1 (L) 11.7 - 15.4 g/dL    HCT 31.1 (L) 35.8 - 46.3 %    MCV 93.7 79.6 - 97.8 FL    MCH 30.4 26.1 - 32.9 PG    MCHC 32.5 31.4 - 35.0 g/dL    RDW 19.2 (H) 11.9 - 14.6 %    PLATELET 160 885 - 847 K/uL    MPV 10.4 9.4 - 12.3 FL    ABSOLUTE NRBC 0.00 0.0 - 0.2 K/uL   METABOLIC PANEL, BASIC    Collection Time: 02/23/22  8:49 AM   Result Value Ref Range    Sodium 132 (L) 136 - 145 mmol/L    Potassium 3.2 (L) 3.5 - 5.1 mmol/L    Chloride 94 (L) 98 - 107 mmol/L    CO2 33 (H) 21 - 32 mmol/L    Anion gap 5 (L) 7 - 16 mmol/L    Glucose 85 65 - 100 mg/dL    BUN 13 8 - 23 MG/DL    Creatinine 0.70 0.6 - 1.0 MG/DL    GFR est AA >60 >60 ml/min/1.73m2    GFR est non-AA >60 >60 ml/min/1.73m2    Calcium 9.1 8.3 - 10.4 MG/DL       Assessment:     Problem List as of 2/24/2022 Date Reviewed: 2/14/2022          Codes Class Noted - Resolved    Chest pain ICD-10-CM: R07.9  ICD-9-CM: 786.50  5/31/2019 - Present        EKG, abnormal ICD-10-CM: R94.31  ICD-9-CM: 794.31  2/15/2022 - Present        * (Principal) Femur fracture, right (HCC) ICD-10-CM: J55.45YD  ICD-9-CM: 821.00  2/11/2022 - Present        COPD with acute lower respiratory infection (HCC) ICD-10-CM: J44.0  ICD-9-CM: 496, 519.8  2/10/2022 - Present        Moderate to severe mitral regurgitation ICD-10-CM: I34.0  ICD-9-CM: 424.0  2/8/2022 - Present        Iron deficiency anemia due to chronic blood loss (Chronic) ICD-10-CM: D50.0  ICD-9-CM: 280.0  2/8/2022 - Present        Closed right hip fracture (HCC) ICD-10-CM: S72.001A  ICD-9-CM: 820.8  2/4/2022 - Present        Other fracture of right femur, initial encounter for closed fracture ([facility] ICD-10-CM: K90.5M4A  ICD-9-CM: 821.00  2/4/2022 - Present        DNR (do not resuscitate) ICD-10-CM: Z66  ICD-9-CM: V49.86  11/9/2021 - Present    Overview Signed 11/9/2021 12:25 PM by Jamir Amaro NP     POST form completed - DNR but full treatment, including intubation. No TRACH. No PEG.               Acute bilateral low back pain without sciatica ICD-10-CM: M54.50  ICD-9-CM: 724.2, 338.19  11/9/2021 - Present        Palliative care patient ICD-10-CM: Z51.5  ICD-9-CM: V66.7  7/14/2021 - Present        Ischemic heart disease, hx pci, cath/pci last 5/2019 ICD-10-CM: I25.9  ICD-9-CM: 414.9  2/24/2021 - Present        H/O carotid endarterectomy ICD-10-CM: Z98.890  ICD-9-CM: V45.89  9/21/2020 - Present        Right ear pain ICD-10-CM: H92.01  ICD-9-CM: 388.70  9/21/2020 - Present        Pulmonary mass ICD-10-CM: R91.8  ICD-9-CM: 786.6  2/11/2020 - Present        Centrilobular emphysema ([address] ICD-10-CM: J43.2  ICD-9-CM: 492.8  12/20/2019 - Present        Carotid stenosis ICD-10-CM: I65.29  ICD-9-CM: 433.10  12/3/2019 - Present        Carotid stenosis, right ICD-10-CM: I65.21  ICD-9-CM: 433.10  12/3/2019 - Present        Hypertensive urgency ICD-10-CM: I16.0  ICD-9-CM: 401.9  6/8/2019 - Present        Chronic respiratory failure with hypoxia (HCC) (Chronic) ICD-10-CM: J96.11  ICD-9-CM: 518.83, 799.02  6/8/2019 - Present        CAD S/P percutaneous coronary angioplasty ICD-10-CM: I25.10, Z98.61  ICD-9-CM: 414.01, V45.82  5/31/2019 - Present        Ventricular ectopy ICD-10-CM: I49.3  ICD-9-CM: 427.69  5/9/2017 - Present        Chronic anxiety ICD-10-CM: F41.9  ICD-9-CM: 300.00  4/27/2017 - Present        Hyperlipidemia (Chronic) ICD-10-CM: E78.5  ICD-9-CM: 272.4  10/10/2016 - Present        Essential hypertension, benign (Chronic) ICD-10-CM: I10  ICD-9-CM: 401.1  10/10/2016 - Present        Coronary artery disease involving native coronary artery of native heart without angina pectoris ICD-10-CM: I25.10  ICD-9-CM: 414.01  10/10/2016 - Present        Hypoxemia ICD-10-CM: R09.02  ICD-9-CM: 799.02  8/25/2013 - Present    Overview Signed 2/5/2014  9:08 AM by Lisbeth Bradley NP     FILIBERTO 8/2013. Pt had over 2 hours 1/2 hours of desaturations at night. O2 was ordered for her to start at 2L in September, but she stated she was feeling better than when FILIBERTO was done and refused O2. FILIBERTO 12/2013: not performed secondary to patient factors.               Acute respiratory failure with hypoxia ([facility] ICD-10-CM: J96.01  ICD-9-CM: 518.81  8/22/2013 - Present        COPD, very severe (HCC) (Chronic) ICD-10-CM: J44.9  ICD-9-CM: 496  8/20/2013 - Present        Personal history of tobacco use ICD-10-CM: O43.847  ICD-9-CM: V15.82  8/19/2013 - Present        History of MI (myocardial infarction) ICD-10-CM: I25.2  ICD-9-CM: 170  8/19/2013 - Present    Overview Addendum 8/30/2018  9:20 AM by Angela Kincaid MD     STEMI 8/19/13:  Angioplasty for in-stent stenosis to LAD             RESOLVED: Chronic obstructive pulmonary disease ([facility] ICD-10-CM: J44.9  ICD-9-CM: 496  10/10/2016 - 8/30/2018        RESOLVED: Acute systolic heart failure ([facility] ICD-10-CM: I50.21  ICD-9-CM: 428.21  8/22/2013 - 8/30/2018        RESOLVED: Pulmonary hemorrhage ICD-10-CM: R04.89  ICD-9-CM: 786.30  8/20/2013 - 6/25/2019        RESOLVED: CAD (coronary artery disease) (Chronic) ICD-10-CM: I25.10  ICD-9-CM: 414.00  8/19/2013 - 8/3/2021        RESOLVED: COPD (chronic obstructive pulmonary disease) (HCC) (Chronic) ICD-10-CM: J44.9  ICD-9-CM: 496  8/19/2013 - 8/20/2013              Right IT fracture s/p Gamma nail fixation (2/4/2022, Adalberto Lea MD // Wilfredo Gallagher post-op acute hypoxic respiratory failure, CAD s/p cardiac catheterization and stent placement     Plan / Recommendations / Medical Decision Making:      Daily physician / PA medical management:     Right hip fracture - PT / OT // WBAT; dyspnea is barrier to progress. Poor endurance.   -2/21 very slow but steady improvement     Hyperthyroidism - thyroid profile checked 2/7, found to be hyperthyroid. Started on methimazole.     CAD with acute chest pain - cardiac catheterization 2/7/2022, she is s/p PCI to LAD diagonal and to distal RCA. Severe MR; follow up with Cardiology.  Continue Effient.  -2/11 remote telemetry x 72hrs; if no chest pain and no events, will d/c telemetry  -2/12 chest pain this AM, tele monitor did not  a change ? indigestion ? Ranexa?  -2/14 no chest pain this AM, recognizes CP likely anxiety-related, no events on telemetry --> d/c telemetry  -2/15 CP did not appear cardiac in nature; prior to episode, she was having indigestion and wanted something other than what pharmacy provided; on Protonix BID already; ECG stable at 11 am, earlier patient was hypertensive, anxious; artifact on AM ECG  -2/17 no further CP     Chronic anemia with ABLA - Hgb 8.8< 9.2>8.9>7.1; stable; iron studies wnl.  -2/14 Hgb 8.9, low but stable  -2/22 recheck labs in a.m.; 2/23 10.1    Hypokalemia 2/24 K 3.2 ; klor con 40meq daily x 3d     COPD, severe - continue PO steroids (40 mg x 3d, then 20mg daily x 3d, then 10mg x 2d then stop) and O2 supplementation; continue nebs; continue Mucinex  -2/11 on 1.5L O2 this AM, sats 96% at rest  -2/16 stable; insistent on having Symbicort; decrease prednisone to 20 mg; was not weaning as planned   -2/18 wean not yet initiated; decrease prednisone to 30mg at lunch x 3d then 20mg x 3d, etc, starting today  -2/21 starting prednisone 20mg daily x 3d today  -2/24 drop prednisone to 10 mg daily x 3d, then stop     Hypertension - BP fluctuating, managed medically. Hypotensive earlier today; nitro past and ACE inhibitor discontinued.   -2/12 /76 - 151/75, consider low dose ACE inhibitor  -2/14 normotensive x 48h, no changes  -2/15 /103, patient anxious at time; repeated 110/70  -2/16 /81 with anxiety; on Norvasc 5mg, Lopressor 50mg q6h; increase Norvasc to 7.5mg  -2/17 BP poorly controlled, Dr. Rebolledo [facility] following; consider increasing Norvasc to 10mg daily; patient reports being on 10mg Demadex at home due to her edema; will restart  -2/18 /61 this AM, much better BP controlled with diuretic on board  -2/20 HR and BP acceptable, 112/70  -2/21 /61, HR stays in 60s; continue current regimen  -2/23 orthostatic hypotension yesterday. Has diuresed significantly. Likely dec in vascular pressure. Will dec Norvasc to 5mg from 7.5 and dec Lopressor to 50 mg q 8hrs rather than q 6. Cont low dose demadex  - still orthostatic in PT but improved. -134/55-66; cont current meds     Hyperlipidemia - continue Zetia / Lipitor 80mg daily.     Pain management - will require regular pain assessment and comprehensive pain management. Has Tylenol and oxycodone PRN. - chronic back pain. Worse past few days. Has louis 5-10mg q 4hrs ordered. Using lidocaine patch but biofreeze may be more effective given msk component. Cont modalities, consider dry needling ;  a bit better today     Pneumonia prophylaxis - incentive spirometer every hour while awake. On Levaquin until ; CXR  without acute findings.      DVT risk / DVT prophylaxis - daily physician / PA exam to assess as patient is at increased risk for of thromboembolism. Mobilize as tolerated. Sequential pneumatic compression devices (SCDs) when in bed; thigh-high or knee-high thromboembolic deterrent hose when out of bed. On Effient.     GI prophylaxis - resume PPI. At times may need additional antacids, Maalox prn.     Depression - ? patient with anxiety that is apparent. PRN benzodiazepines.  - pt is very realistic about her health and decline. She speaks calmly about having her  arrangements made and that it \"is what it is. \" Very much at peace. Encouraged her to participate and allow self to heal in the best way possible  - anxiety overshadowing her health overall, open to trying antidepressant / anxiolytic, has used Lexapro in past but does not recall +/- effects; revisit tomorrow  - agreeable to Lexapro 10mg daily  - doing so much better, decreased anxiety     General skin care / wound prevention - monitor right incision and general skin wound status daily per staff and physician / PA.  At risk for failure due to impaired mobility  - POD #7 incision c/d/i  - d/c staples   - right hip incision healing with steri-strips     Bladder program / urinary retention - schedule voids q 6-8 hrs. Check post-void residual every shift and as needed; in-and-out catheter if post-void residual is more than 400ml.     Bowel program - at risk for constipation as a side effect of opioids, other medications, impaired mobility, etc. MiraLAX daily for regularity, Marisela-Colace for stool softener. PRN MOM, bisacodyl suppository or tablets for constipation.       Time spent was 25 minutes with over 1/2 in direct patient care/examination, consultation and coordination of care.      Signed By: Saida Larson MD     February 24, 2022

## 2022-02-24 NOTE — PROGRESS NOTES
OT Daily Note  Time In 1320   Time Out 1400   Pain: Pt c/o pain 8/10. RN Grace Wolf adminstered medication. Functional Mobility      Score Comments   Sit to Stand 4: Supervision or touching A S   Transfer Assist 4: Supervision or touching A Transfer Type: SPT   Equipment: Rolling Walker   Comments: CGA   Pt got dizzy. Self-Care   Pt transferred on toilet with S. Activity Tolerance   Pt did 48 pc puzzle with 1 lb B distal wrist weights to improve shoulder stabilization for walking. Pt did not get to finish due to time limitations, but was demonstrating good progress. Pt completed 5 minutes on the ergometer frontwards and backwards with moderate resistance to increase UB strength and activity tolerance for integration into functional mobility. Pt initially had pain with movement, but wanted to keep doing it to stretch out. Interdisciplinary Communication: PT Nancy Manrique on pt's performance    Plan: Continue with POC. Pt was left in bathroom with PT Nancy Manrique.      Selene Counts OTR/L  2/24/2022

## 2022-02-24 NOTE — PROGRESS NOTES
OT DISCHARGE REPORT    Time In: 0827  Time Out: 0915    Goals:      STG 1: Pt will be set-up with toileting by 2/18/22 to prevent skin breakdown. 2/17/2022 Pt is CGA; extend goal until d/c  LTG 1: Pt will be independent with toileting by 2/25/22 to prevent skin breakdown.    2/24/2022 Pt is S secondary to poor line management     STG 2: Pt will be moderate A with LB dressing by 2/18/22 to reduce risk of falls. 2/17/2022 Goal met   LTG 2: Pt will be set-up with LB dressing by 2/25/22 to reduce risk of falls.    2/24/2022 Goal met      STG 3: Pt will be set-up with bathing by 2/18/22 to promote good skin integrity. 2/17/2022 Goal met   LTG 3: Pt will be independent with bathing by 2/25/22 to promote good skin integrity. 2/24/2022 Pt is S/U      STG 4: Pt will be able to get a snack from the fridge with touching A by 2/18/22 to promote proper nutrition and hydration. 2/17/2022 Goal met   LTG 4: Pt will be able to get a snack from the fridge with independent by 2/25/22 to promote proper nutrition and hydration.    2/24/2022 Pt is S due to safety     LTG 5: Pt/caregiver will verbalize  understanding of OT recommendations regarding ADL status, functional transfer status, home safety, DME, AE, energy conservation techniques, safety awareness, activity tolerance, and/or follow-up therapy to increase safety with functional tasks upon discharge. 2/17/2022 Goal ongoing  2/24/2022 Goal met   Mobility   Usual Performance Score Comments   Supine to Sit 6: Independent I   Sit to Stand 6: Independent I   Transfer Assist 4: Supervision or touching A Transfer Type: SPT   Equipment: Rolling Walker   Comments: S with RW     Activities of Daily Living    Usual Performance Score Comments   Eating 6: Independent I   Oral Hygiene 6: Independent I   Bathing 5: S/U or clean-up assist Type of Shower: Bath Pack  Position: Supported sitting and Standing PRN   Adaptive  Equipment: Walker  Comments: S/U   Upper Body  Dressing 5: S/U or clean-up assist Items Applied: Pullover  Position: Unsupported Sitting  Comments: S/U   Lower Body Dressing 4: Supervision or touching A Items Applied: Underwear and Elastic pants  Position: Standing PRN and Unsupported Sitting  Adaptive Equipment: N/A  Comments: S   Donning/Matheny Footwear 4: Supervision or touching A Items Applied: Socks and Shoes with fasteners   Adaptive Equipment: N/A  Comments: Cueing   Toilet Transfer 4: Supervision or touching A Transfer Type: SPT   Equipment: Rolling Walker   Comments: [address] [place] [address] 4: Supervision or touching A Output: N/A  Comments: S   Education  Dressing strategies     Plan of Care: Please see Care Plan for progress towards goals during stay  Precautions at Discharge: Falls  Discharge Location: Home with family  Safety/Supervision Recommendations/Functional Level: Pt needs occasional A with ADL and IADL. Family Training: Completed with S/O    Recommended Continuing Therapy: HH OT  Residual Deficits: Balance, activity tolerance, and anxiety  Progress over LOS: Pt made improvements in balance, strength, activity tolerance, and compensatory strategies allowing pt to decrease level of assist in bathing, dressing, and toileting allowing the pt to return home with S/O. Pt's biggest barrier was the biggest barrier to recovery. Summary of Session: Pt was in bed and agreeable to tx. Pt's performance with ADL is reflected in above chart. S/O Tayo Mac was present for family training and verbalized understanding and no concerns.      Grey Walker, OT   2/24/2022

## 2022-02-24 NOTE — PROGRESS NOTES
Comprehensive Nutrition Assessment    Type and Reason for Visit: Initial,RD nutrition re-screen/LOS    Nutrition Recommendations/Plan:   Meals and Snacks:  Continue current diet. Offer foods per preferences. Nutrition Supplement Therapy:   Medical food supplement therapy:  Continue Ensure Enlive three times per day (this provides 350 kcal and 20 grams protein per bottle)- no strawberry     Malnutrition Assessment:  Malnutrition Status: At risk for malnutrition (specify) (prefers foods at home, not hospital foods)    Nutrition Assessment:   Nutrition History: Pt with variable appetite. Cooks for self and s/o at home eating 2 meals per day with snacks. States in adulthood was always thin, but has gained 15 pounds over the past several years. Is now in a healthful BMI range. Pt would prefer to lose a \"few pounds\". Nutrition Background: Admitted to acute rehab with R femur fracture. Recent dx mitral valve regurgitation, iron deficiency anemia and COPD. Nutrition Interval:  Pt seen sitting in chair after lunch. Pt able to order foods off menu. Is excited to go home soon and prepare meals- or have significant other prepare meals with her instructions.      Nutrition Related Findings:   No overt s/s wasting      Current Nutrition Therapies:  ADULT ORAL NUTRITION SUPPLEMENT Breakfast, Lunch, Dinner; Standard High Calorie/High Protein  ADULT DIET Regular    Current Intake:   Average Meal Intake: 51-75% Average Supplement Intake: Unable to assess (no intake documented, pt states she drinks some. )      Anthropometric Measures:  Height: 5' 8\" (172.7 cm)  Current Body Wt: 70.5 kg (155 lb 6.8 oz) (2/10), Weight source: Bed scale  BMI: 23.6, Normal weight (BMI 22.0-24.9) age over 72  Admission Body Weight: 150 lb 14.4 oz (2/6/22 via bed)  Ideal Body Weight (lbs) (Calculated): 140 lbs (64 kg), 111 %  Usual Body Wt:  (150-155# for past year per EMR), Percent weight change:            Edema: No data recorded   Estimated Daily Nutrient Needs:  Energy (kcal/day): 5146-7023 (Kcal/kg (20-25), Weight Used: Current (70.5 kg))  Protein (g/day): 71-85 (1-1.2) Weight Used: (Current)  Fluid (ml/day):   (1 ml/kcal)    Nutrition Diagnosis:   · Inadequate oral intake related to  (decreased appetite, foods not the same as home) as evidenced by intake 51-75%    Nutrition Interventions:   Food and/or Nutrient Delivery: Continue current diet,Continue oral nutrition supplement  Nutrition Education/Counseling: Survival skills/brief education completed (Discussed benefit of having extra protein stores/wt when ill)  Coordination of Nutrition Care: Continue to monitor while inpatient  Plan of Care discussed with patient, Brittany Martinez RN. Goals:       Active Goal: Meet at least 75% of estimated needs by next RD assessment    Nutrition Monitoring and Evaluation:      Food/Nutrient Intake Outcomes: Food and nutrient intake,Supplement intake       Discharge Planning:    Continue oral nutrition supplement (If unable to consume adequate orally at home. )    Faustino Smith, [address] [place] [address],   Contact: [phone]

## 2022-02-24 NOTE — PROGRESS NOTES
Problem: Falls - Risk of  Goal: *Absence of Falls  Description: Document Ace Croft Fall Risk and appropriate interventions in the flowsheet.   Outcome: Progressing Towards Goal  Note: Fall Risk Interventions:  Mobility Interventions: Utilize walker, cane, or other assistive device         Medication Interventions: Evaluate medications/consider consulting pharmacy,Patient to call before getting OOB    Elimination Interventions: Call light in reach,Patient to call for help with toileting needs    History of Falls Interventions: Consult care management for discharge planning,Door open when patient unattended,Evaluate medications/consider consulting pharmacy         Problem: Patient Education: Go to Patient Education Activity  Goal: Patient/Family Education  Outcome: Progressing Towards Goal

## 2022-02-25 VITALS
TEMPERATURE: 97.7 F | RESPIRATION RATE: 16 BRPM | HEIGHT: 68 IN | DIASTOLIC BLOOD PRESSURE: 62 MMHG | SYSTOLIC BLOOD PRESSURE: 133 MMHG | BODY MASS INDEX: 23.63 KG/M2 | OXYGEN SATURATION: 98 % | HEART RATE: 80 BPM

## 2022-02-25 PROCEDURE — 74011250637 HC RX REV CODE- 250/637: Performed by: PHYSICAL MEDICINE & REHABILITATION

## 2022-02-25 PROCEDURE — 74011000250 HC RX REV CODE- 250: Performed by: PHYSICAL MEDICINE & REHABILITATION

## 2022-02-25 PROCEDURE — 94640 AIRWAY INHALATION TREATMENT: CPT

## 2022-02-25 PROCEDURE — 97530 THERAPEUTIC ACTIVITIES: CPT

## 2022-02-25 PROCEDURE — 99239 HOSP IP/OBS DSCHRG MGMT >30: CPT | Performed by: PHYSICAL MEDICINE & REHABILITATION

## 2022-02-25 PROCEDURE — 94760 N-INVAS EAR/PLS OXIMETRY 1: CPT

## 2022-02-25 PROCEDURE — 97116 GAIT TRAINING THERAPY: CPT

## 2022-02-25 PROCEDURE — 74011250637 HC RX REV CODE- 250/637: Performed by: PHYSICIAN ASSISTANT

## 2022-02-25 PROCEDURE — 74011636637 HC RX REV CODE- 636/637: Performed by: PHYSICAL MEDICINE & REHABILITATION

## 2022-02-25 PROCEDURE — 97110 THERAPEUTIC EXERCISES: CPT

## 2022-02-25 PROCEDURE — 77010033678 HC OXYGEN DAILY

## 2022-02-25 RX ORDER — NYSTATIN 100000 [USP'U]/ML
500000 SUSPENSION ORAL 4 TIMES DAILY
Qty: 473 ML | Refills: 0 | Status: SHIPPED | OUTPATIENT
Start: 2022-02-25 | End: 2022-03-18

## 2022-02-25 RX ADMIN — POTASSIUM CHLORIDE 40 MEQ: 20 TABLET, EXTENDED RELEASE ORAL at 08:43

## 2022-02-25 RX ADMIN — PREDNISONE 10 MG: 10 TABLET ORAL at 11:40

## 2022-02-25 RX ADMIN — OXYCODONE HYDROCHLORIDE 5 MG: 5 TABLET ORAL at 11:40

## 2022-02-25 RX ADMIN — ALBUTEROL SULFATE 2.5 MG: 2.5 SOLUTION RESPIRATORY (INHALATION) at 08:00

## 2022-02-25 RX ADMIN — ASPIRIN 81 MG: 81 TABLET ORAL at 08:43

## 2022-02-25 RX ADMIN — IPRATROPIUM BROMIDE AND ALBUTEROL SULFATE 3 ML: .5; 3 SOLUTION RESPIRATORY (INHALATION) at 06:18

## 2022-02-25 RX ADMIN — PRASUGREL 10 MG: 10 TABLET, FILM COATED ORAL at 09:00

## 2022-02-25 RX ADMIN — PANTOPRAZOLE SODIUM 40 MG: 40 TABLET, DELAYED RELEASE ORAL at 08:43

## 2022-02-25 RX ADMIN — GUAIFENESIN 1200 MG: 600 TABLET ORAL at 08:43

## 2022-02-25 RX ADMIN — SENNOSIDES AND DOCUSATE SODIUM 2 TABLET: 8.6; 5 TABLET ORAL at 08:45

## 2022-02-25 RX ADMIN — ESCITALOPRAM OXALATE 10 MG: 10 TABLET, FILM COATED ORAL at 08:45

## 2022-02-25 RX ADMIN — BUDESONIDE AND FORMOTEROL FUMARATE DIHYDRATE 2 PUFF: 80; 4.5 AEROSOL RESPIRATORY (INHALATION) at 08:00

## 2022-02-25 RX ADMIN — METHIMAZOLE 10 MG: 5 TABLET ORAL at 08:43

## 2022-02-25 RX ADMIN — OXYCODONE HYDROCHLORIDE 5 MG: 5 TABLET ORAL at 06:13

## 2022-02-25 RX ADMIN — TIOTROPIUM BROMIDE INHALATION SPRAY 2 PUFF: 3.12 SPRAY, METERED RESPIRATORY (INHALATION) at 09:00

## 2022-02-25 RX ADMIN — AMLODIPINE BESYLATE 5 MG: 5 TABLET ORAL at 08:45

## 2022-02-25 RX ADMIN — ACETAMINOPHEN 650 MG: 325 TABLET ORAL at 05:58

## 2022-02-25 NOTE — PROGRESS NOTES
Hourly rounding completed on this shift. PRN pain medication given and RT aware pt requesting breathing tx. No respiratory stress noted. All needs met. Pt is currently resting in bed. Will give report to oncoming nurse.

## 2022-02-25 NOTE — PROGRESS NOTES
All discharge paperwork reviewed with pt and pt's spouse including appointments and medications. Pt verbalized understanding about all discharge instructions. Pt discharged to private vehicle with spouse.

## 2022-02-25 NOTE — PROGRESS NOTES
PHYSICAL THERAPY DAILY NOTE  Time In: 0926  Time Out: 6022  Patient Seen For: AM;Gait training;Patient education; Therapeutic exercise;Transfer training; Other (see progress notes)    Subjective: Patient reporting she is going home today         Objective:Vital Signs:  Patient Vitals for the past 12 hrs:   Temp Pulse Resp BP SpO2   02/25/22 0722 97.7 °F (36.5 °C) 80 16 133/62 98 %   02/25/22 0713 -- -- -- (!) 116/50 --   02/25/22 0709 97.9 °F (36.6 °C) 78 16 (!) 121/37 96 %   02/25/22 0621 -- -- -- -- 98 %   02/25/22 0557 97.9 °F (36.6 °C) 80 18 (!) 112/44 98 %     Pain level:3 to 7 out of 10  Pain location:right hip, right side of low back  Pain interventions:Medication per order, rest, positioning,relaxation, body mechanics, WBAT RLE, lidocaine       Patient education:Bed mobility training,transfer training, gait training, balance training,fall precautions, body mechanics,activity pacing, energy conservation,WBAT RLE precautions,Patient verbalizing understanding and demonstrating understanding of patient education.  Recommend follow up education with [facility] PT    Interdisciplinary Communication:spoke with RN regarding pain medication schedule    Other (comment) (respiratory and hip)  GROSS ASSESSMENT Daily Assessment     NA       COGNITION Daily Assessment    Alert, able to follow commands,cooperating,participating, motivated, anxious regarding pain in RLE, low back and SOB with gait         BED/MAT MOBILITY Daily Assessment   Increased time and effort to complete        Rolling Right : 0 (Not tested)  Rolling Left : 0 (Not tested)  Supine to Sit : 6 (Modified independent)  Sit to Supine : 6 (Modified independent)       TRANSFERS Daily Assessment   Increased time and effort to complete     Transfer Type: SPT with walker  Transfer Assistance : 6 (Modified independent)  Sit to Stand Assistance: Modified independent (with RW)  Car Transfers: Not tested       GAIT Daily Assessment    Amount of Assistance: 5 (Supervision/setup)  Distance (ft): 70 Feet (ft)  Assistive Device: Walker, rolling   Gait training with RW demonstrating slow start/stop step to antalgic gait pattern with decreased stance phase  RLE, decreased step length, decreased step clearance and flexed posture. Verbal and visual cues to correct gait deviations. No loss of balance. Progressing towards partial step through gait pattern with improved stance phase RLE and improved step length LLE      STEPS or STAIRS Daily Assessment    NT       BALANCE Daily Assessment   No loss of balance during gait or transfer training Sitting - Static: Good (unsupported)  Sitting - Dynamic: Good (unsupported)  Standing - Static: Fair (with RW)  Standing - Dynamic : Impaired       WHEELCHAIR MOBILITY Daily Assessment    Curbs/Ramps Assist Required (FIM Score): 0 (Not tested)  Wheelchair Setup Assist Required : 5 (Supervision/setup)  Wheelchair Management: Manages left brake;Manages right brake;Manages left footrest;Manages right footrest       LOWER EXTREMITY EXERCISES Daily Assessment   Increased time and effort to complete with multiple and frequent rest breaks. Cues for correct form   Extremity: Right  Exercise Type #1: Supine lower extremity strengthening  Sets Performed: 3  Reps Performed: 10  Level of Assist: Minimal assistance (with hip ABD with skate)     SUPINE EXERCISES Sets Reps Comments   Ankle Pumps 3 10    Quad Sets 3 10    Glut Sets 3 10    Heel Slides 3 10    Hip Abduction 3 10 With skate   Short Arc Quad 3 10             Assessment:Gait distance and gait pattern cont to slowly improve       Patient remained in  room at end of treatment and remained up in recliner with LEs elevated and with needs in reach. 02 at 1.5 lpm    Plan of Care: D/C home with family    Polo Moser, PT  2/25/2022

## 2022-02-25 NOTE — PROGRESS NOTES
PHYSICAL THERAPY DISCHARGE SUMMARY   TIME IN 1517  TIME OUT 1605   Precautions at discharge: Other (comment); Limited or no weight-bearing (specify) (Fall precautions, WBAT RLE)    Problem List:    Decreased strength B LE  [x]     Decreased strength trunk/core  [x]     Decreased AROM   [x]     Decreased PROM  [x]     Decreased balance sitting  []     Decreased balance standing  [x]     Decreased endurance  [x]     Pain  [x]       Functional Limitations:   Decreased independence with bed mobility  [x]     Decreased independence with functional transfers  [x]     Decreased independence with ambulation  [x]     Decreased independence with stair negotiation  [x]            Outcome Measures: Vital Signs:02 at 1.5 lpm, 02 sat 92 to 97% during assessment  Patient Vitals for the past 12 hrs:   Temp Pulse Resp BP SpO2   02/24/22 2108 98.1 °F (36.7 °C) 68 18 (!) 104/57 94 %   02/24/22 2050 -- -- -- -- 96 %   02/24/22 1506 97.7 °F (36.5 °C) 74 18 (!) 141/63 95 %   02/24/22 1450 -- -- -- -- 98 %     Pain level:2 to 8 out of 10  Pain location:right hip, right side of low back  Pain interventions:Medication per order, rest, positioning,relaxation, WBAT RLE, body mechanics    Patient education:Bed mobility training,transfer training, gait training, balance training,fall precautions, body mechanics,activity pacing, WBAT RLE precautions, w/c parts management, Patient verbalizing understanding and demonstrating understanding of patient education.  Recommend follow up education with [facility] PT      Interdisciplinary Communication:spoke with RN case manager regarding D/C plans      Cognition: Alert, able to follow commands,cooperating,participating, motivated, anxious during functional mobility secondary to COPD status       MMT Initial Assessment   Right Lower Extremity Left Lower Extremity   Hip Flexion 3- 4+   Knee Extension 3 4+   Knee Flexion 3 4+   Ankle Dorsiflexion 3+ 4+      MMT Discharge Assessment   Right Lower Extremity Left Lower Extremity   Hip Flexion 2+ 4   Knee Extension 4 5   Knee Flexion 4- 4+   Ankle Dorsiflexion 4+ 5   0/5 No palpable muscle contraction  1/5 Palpable muscle contraction, no joint movement  2-/5 Less than full range of motion in gravity eliminated position  2/5 Able to complete full range of motion in gravity eliminated position  2+/5 Able to initiate movement against gravity  3-/5 More than half but not full range of motion against gravity  3/5 Able to complete full range of motion against gravity  3+/5 Completes full range of motion against gravity with minimal resistance  4-/5 Completes full range of motion against gravity with minimal-moderate resistance  4/5 Completes full range of motion against gravity with moderate resistance  4+/5 Completes full range of motion against gravity with moderate-maximum resistance  5/5 Completes full range of motion against gravity with maximum resistance     AROM: LLE generally decreased, functional, RLE ankle WFL, knee 0 to 95, hip ABD 10, flex 60, ext 0    PRIMARY MODE OF LOCOMOTION: ambulation  Please see [facility] Interdisciplinary Eval: Coordination/Balance Section for details regarding FIM score description.     BED/CHAIR/WHEELCHAIR TRANSFERS Initial Assessment Discharge Assessment   Rolling Right 0 (Not tested) 6 (Modified independent)   Rolling Left 0 (Not tested) 6 (Modified independent)   Supine to Sit 3 (Moderate assistance) 6 (Modified independent)   Sit to Stand Minimal assistance Modified independent (with RW)   Sit to Supine 0 (Not tested) 6 (Modified independent)   Transfer Type SPT with walker SPT with walker   Comments Increased time and effort to complete bed mobility and transfers, altered body mechanics secondary to right hip pain, weakness and limited ROM Increased time and effort to complete bed mobility and transfers, altered body mechanics secondary to right hip pain, weakness and limited ROM   Car Transfer Not tested  (SBA with SPT with RW in/out of car)   Car Type patient's Deven Versa patient's Deven Versa       WHEELCHAIR MOBILITY/MANAGEMENT Initial Assessment Discharge Assessment   Able to Propel       Functional Level       Curbs/ramps assistance required 0 (Not tested) 0 (Not tested)   Wheelchair set up assistance required 3 (Moderate assistance) 5 (Supervision/setup)   Wheelchair management Manages left brake,Manages right brake Manages left brake;Manages right brake;Manages left footrest;Manages right footrest       WALKING INDEPENDENCE Initial Assessment Discharge Assessment   Assistive device Other (comment) (Parallel bars) Walker, rolling   Ambulation assistance - level surface 4 (Minimal assistance) 5 (Supervision)   Distance 3 Feet (ft) 60 Feet (ft)   Comments slow start/stop partial step through gait pattern progressing to slow cont partial step through gait pattern. Decreased stance phase RLE with decreased step length LLE slow start/stop partial step through gait pattern progressing to slow cont partial step through gait pattern. Decreased stance phase RLE with decreased step length LLE   Ambulation assistance - unlevel surface 0 (Not tested) 0 (Not tested)       STEPS/STAIRS Initial Assessment Discharge Assessment   Steps/Stairs ambulated 0 1 (3 inch step)   Rail Use  (with RW)  (with RW)   Functional Level       Comments Up/down 3 inch step with RW and SBA, leading up with LLE and down with RLE. Increased time and effort to complete with cues for gait sequence Up/down 3 inch step with RW and SBA, leading up with LLE and down with RLE. Increased time and effort to complete with cues for gait sequence   Curbs/Ramps 0 (Not tested) 0 (Not tested)       QUALITY INDICATOR ASSIST COMMENTS   Roll right (&return to back) 6: Independent    Roll left (& return to back) 6:  Independent    Supine to sit 6: Independent    Sit to stand 6: Independent    Chair/bed-to-chair transfer 6: Independent    Walk 10 feet 4: Supervision or touching A    Walk 50 feet with 2 turns 4: Supervision or touching A    Walk 150 feet Not Tested: Not attempted due to decreased endurance with SOB and fatigue after 60ft    Walk 10 feet on uneven  Not Tested: Not attempted due to right hip pain and impaired balance    1 step/curb 4: Supervision or touching A    4 steps Not Tested: Not attempted due to right hip pain, RLE weakness, decreased endurance    12 steps Not Tested: Not attempted due to decreased endurance, right hip pain and weakness     object 4: Supervision or touching A Using reacher   Wheel 50' w/2 turns Not Tested: Not applicable secondary to pt not completing activity previously    Wheel 150' Not Tested: Not applicable secondary to pt not completing activity previously    Car Transfer 4: Supervision or touching A             PHYSICAL THERAPY PLAN OF CARE    LTGs: patient met 3 out of 6 goals per eval. Refer to care plan for details    Pt would benefit from continued skilled physical therapy in order to improve independent functional mobility within the home with use of least restrictive device. Interventions may include range of motion (AROM, PROM B LE/trunk), motor function (B LE/trunk strengthening/coordination), activity tolerance (vitals, oxygen saturation levels), bed mobility training, balance activities, gait training (progressive ambulation program), and functional transfer training. HEP handout:issued written LE HEP. Min assist and cues to complete  SUPINE EXERCISES Sets Reps Comments   Ankle Pumps 1 30    Quad Sets 1 30    Glut Sets 1 30    Heel Slides 3 10    Hip Abduction 3 10 Min assist   Short Arc Quad 3 10          Pt to be discharged 02/25/22 with assistance provided by significant other. family training completed this AM with patient and her . Refer to AM note  Therapy Recommendations upon discharge: Andry Lan needs at discharge: SUZANNELOLLY IZQUIERDO [facility] provided wheelchair, Patient purchased a RW      Please see IRC;  Interdisciplinary Eval, Care Plan, and Patient Education for further information regarding physical therapy discharge summary and plan of care. Patient returned to room at end of treatment and remained up in recliner with LEs elevated and with needs in reach. 02 at 1.5 lpm    Bridgette Hernandez, PT  2/24/2022

## 2022-02-25 NOTE — DISCHARGE SUMMARY
Chema Jones MD  Medical Director  [facility], [address] [facility]  Tel: [phone]       PHYSICAL MEDICINE & REHABILITATION DISCHARGE SUMMARY     Date: 2/25/2022  Admission Date: 2/10/2022  Discharge Date: 2/25/2022    Surgeon: Dr Mohini Alas  Primary Care Provider: James Marrero MD    Admission Condition: good  Discharged Condition: good    Active Problems  S/p Right gamma nail insertion for a right IT femur fx  COPD with acute lower resp infxn and exacerbation  Chronic respiratory failure with hypoxia  CAD s/p PCA stent x 2  YULISA   Moderate to severe MV regurgitation  Severe COPD; O2 and Steroid dependent  Orthostatic hypotension        Acute Rehab Dx:  Gait impairment / gait dysfunction  Debility  Deconditioning  Mobility and ambulation deficits  Self care / ADL deficits    Hospital Course: The patient was admitted to [address] [place] [address] by Anh De La Rosa MD on 2/10/2022 s/p Right IT fx s/p gamma nail insertion with post op NSTEMI and COPD exacerbation    HPI: The patient is a right-hand dominant, previously functionally independent 64yo WF with PMH including CAD s/p PCI, HTN, O2- and steroid-dependent COPD, GERD, DDD with T12 compression fracture (11/2021), past tobacco use. She presented to the ED 2/3/2022 after mechanical fall with subsequent right hip pain and inability to bear weight. XR noted right femur intertrochanteric fracture. Cardiology cleared patient for surgery. On 2/4, she underwent gamma nail insertion by Theodore Hurley MD (WBAT). She was evaluated by PT / OT on POD #1 and was found to have mobility and self-care deficits. Early POD #2, she experienced respiratory distress, tachycardia and substernal chest discomfort initially improved by NTG and BiPAP. CXR unrevealing, ECG not consistent with acute STEMI.  Cardiology opined likely supply-demand mismatch in setting of tachycardia and hypoxemic respiratory failure and recommended TTE and PE work-up (ongoing). 2/6 she had an acute change in her O2 needs requiring high FiO2 on bipap.  She was given 40mg lasix IV and sent for CTA chest due to concerns for PE.  Pt had been weaned to 4L NC, but still having increased work of breathing. Jackson Sullivan was wheezing and coughing, although not productive.  Denied fevers.  ct showed newer nodules, but this didn't  account for the acute change in her breathing. It did show increased interstitial prominence which could be edema post surgery and bc she had IVF infusing. Pulmonary was consulted. Po Pred was changed to IV solumedrol. They added BNP to labs, pro carlos and lactic acid to r/o infxn as well as spt and blood cxs. She was  Found to have elevated troponins. Repeat ECHO with EF of 52% but mod to severe mitral regurgitation. EKG then showed ST depression. She was placed on BiPaP and improved. On 2/7 she had severe cp and underwent a Stat cardiac catheterization with PCI to the LAD diagonal and to the distal right coronary artery was performed. The following day, she had no cp and was back on 2L O2. She had further chest pain on 2/9 . troponins were 3869.5 and NT ProBNp 21,522. ntg paste added but not thought to be an acute cardiac event. Today, 2/10, she is on 2L O2. Without cp. He had hyptension with sbp in the 80s after transferring from chair back to be. ACE inhibitor and nitro paste discontinued. Physical Medicine and Rehab service was consulted, and patient was initially evaluated by Dr. Simi Clarke / REKHA Lehman on 2/6        REHABILITATION COURSE:   Right IT fracture s/p Gamma nail fixation (2/4/2022, Barry Conner MD // Yuki Jimenez post-op acute hypoxic respiratory failure, CAD s/p cardiac catheterization and stent placement     Plan / Recommendations / Medical Decision Making:      Daily physician / PA medical management:     Right hip fracture - PT / OT // WBAT; dyspnea is barrier to progress.  Poor endurance.   -2/21 very slow but steady improvement     Hyperthyroidism - thyroid profile checked 2/7, found to be hyperthyroid. Started on methimazole.     CAD with acute chest pain - cardiac catheterization 2/7/2022, she is s/p PCI to LAD diagonal and to distal RCA. Severe MR; follow up with Cardiology.  Continue Effient.  -2/11 remote telemetry x 72hrs; if no chest pain and no events, will d/c telemetry  -2/12 chest pain this AM, tele monitor did not  a change ? indigestion ? Ranexa?  -2/14 no chest pain this AM, recognizes CP likely anxiety-related, no events on telemetry --> d/c telemetry  -2/15 CP did not appear cardiac in nature; prior to episode, she was having indigestion and wanted something other than what pharmacy provided; on Protonix BID already; ECG stable at 11 am, earlier patient was hypertensive, anxious; artifact on AM ECG  -2/17 no further CP     Chronic anemia with ABLA - Hgb 8.8< 9.2>8.9>7.1; stable; iron studies wnl.  -2/14 Hgb 8.9, low but stable  -2/22 recheck labs in a.m.; 2/23 10.1     Hypokalemia 2/24 K 3.2 ; klor con 40meq daily x 3d     COPD, severe - continue PO steroids (40 mg x 3d, then 20mg daily x 3d, then 10mg x 2d then stop) and O2 supplementation; continue nebs; continue Mucinex  -2/11 on 1.5L O2 this AM, sats 96% at rest  -2/16 stable; insistent on having Symbicort; decrease prednisone to 20 mg; was not weaning as planned   -2/18 wean not yet initiated; decrease prednisone to 30mg at lunch x 3d then 20mg x 3d, etc, starting today  -2/21 starting prednisone 20mg daily x 3d today  -2/24 drop prednisone to 10 mg daily x 3d, then stop     Hypertension - BP fluctuating, managed medically. Hypotensive earlier today; nitro past and ACE inhibitor discontinued.   -2/12 /76 - 151/75, consider low dose ACE inhibitor  -2/14 normotensive x 48h, no changes  -2/15 /103, patient anxious at time; repeated 110/70  -2/16 /81 with anxiety; on Norvasc 5mg, Lopressor 50mg q6h; increase Norvasc to 7.5mg  - BP poorly controlled, Dr. Fritz Linker following; consider increasing Norvasc to 10mg daily; patient reports being on 10mg Demadex at home due to her edema; will restart  - /61 this AM, much better BP controlled with diuretic on board  - HR and BP acceptable, 112/70  - /61, HR stays in 60s; continue current regimen  - orthostatic hypotension yesterday. Has diuresed significantly. Likely dec in vascular pressure. Will dec Norvasc to 5mg from 7.5 and dec Lopressor to 50 mg q 8hrs rather than q 6. Cont low dose demadex  - still orthostatic in PT but improved. -134/55-66; cont current meds  - [phone]; f/u with Cardiology; Dr Emerson Arellano      Hyperlipidemia -Zuri Pravin / Lipitor 80mg daily.     Pain management - will require regular pain assessment and comprehensive pain management. Has Tylenol and oxycodone PRN. - chronic back pain. Worse past few days. Has louis 5-10mg q 4hrs ordered. Using lidocaine patch but biofreeze may be more effective given msk component. Cont modalities, consider dry needling ;  a bit better today     Pneumonia prophylaxis - incentive spirometer every hour while awake. On Levaquin until ; CXR  without acute findings.      DVT risk / DVT prophylaxis - daily physician / PA exam to assess as patient is at increased risk for of thromboembolism. Mobilize as tolerated. Sequential pneumatic compression devices (SCDs) when in bed; thigh-high or knee-high thromboembolic deterrent hose when out of bed. On Effient.     GI prophylaxis - resume PPI. At times may need additional antacids, Maalox prn.     Depression - ? patient with anxiety that is apparent. PRN benzodiazepines.  - pt is very realistic about her health and decline. She speaks calmly about having her  arrangements made and that it \"is what it is. \" Very much at peace.  Encouraged her to participate and allow self to heal in the best way possible  -2/14 anxiety overshadowing her health overall, open to trying antidepressant / anxiolytic, has used Lexapro in past but does not recall +/- effects; revisit tomorrow  -2/16 agreeable to Lexapro 10mg daily  -2/22 doing so much better, decreased anxiety     General skin care / wound prevention - monitor right incision and general skin wound status daily per staff and physician / PA. At risk for failure due to impaired mobility  -2/11 POD #7 incision c/d/i  -2/16 d/c staples 2/18  -2/20 right hip incision healing with steri-strips     Bladder program / urinary retention - schedule voids q 6-8 hrs. Check post-void residual every shift and as needed; in-and-out catheter if post-void residual is more than 400ml.     Bowel program - at risk for constipation as a side effect of opioids, other medications, impaired mobility, etc. MiraLAX daily for regularity, Marisela-Colace for stool softener. PRN MOM, bisacodyl suppository or tablets for constipation. FUNCTIONAL LEVEL ON ADMISSION:  PT ; max assist with bed mobility , min/mod assist STS, min assist transfers, min assist for walking 3ft with RW.   OT; no recent ADLs recorded x set up for grooming and feeding. FUNCTIONAL LEVEL AT DISCHARGE:  PHYSICAL THERAPY DISCHARGE SUMMARY   TIME IN 1517  TIME OUT 1605   Precautions at discharge: Other (comment); Limited or no weight-bearing (specify) (Fall precautions, WBAT RLE)     Problem List:    Decreased strength B LE  [x]? Decreased strength trunk/core  [x]? Decreased AROM   [x]? Decreased PROM  [x]? Decreased balance sitting  []? Decreased balance standing  [x]? Decreased endurance  [x]? Pain  [x]?        Functional Limitations:   Decreased independence with bed mobility  [x]? Decreased independence with functional transfers  [x]? Decreased independence with ambulation  [x]? Decreased independence with stair negotiation  [x]?                Outcome Measures: Vital Signs:02 at 1.5 lpm, 02 sat 92 to 97% during assessment  Patient Vitals for the past 12 hrs:    Temp Pulse Resp BP SpO2   02/24/22 2108 98.1 °F (36.7 °C) 68 18 (!) 104/57 94 %   02/24/22 2050 -- -- -- -- 96 %   02/24/22 1506 97.7 °F (36.5 °C) 74 18 (!) 141/63 95 %   02/24/22 1450 -- -- -- -- 98 %      Pain level:2 to 8 out of 10  Pain location:right hip, right side of low back  Pain interventions:Medication per order, rest, positioning,relaxation, WBAT RLE, body mechanics     Patient education:Bed mobility training,transfer training, gait training, balance training,fall precautions, body mechanics,activity pacing, WBAT RLE precautions, w/c parts management, Patient verbalizing understanding and demonstrating understanding of patient education.  Recommend follow up education with [facility] PT        Interdisciplinary Communication:spoke with RN case manager regarding D/C plans        Cognition: Alert, able to follow commands,cooperating,participating, motivated, anxious during functional mobility secondary to COPD status                    MMT Initial Assessment    Right Lower Extremity Left Lower Extremity   Hip Flexion 3- 4+   Knee Extension 3 4+   Knee Flexion 3 4+   Ankle Dorsiflexion 3+ 4+                   MMT Discharge Assessment    Right Lower Extremity Left Lower Extremity   Hip Flexion 2+ 4   Knee Extension 4 5   Knee Flexion 4- 4+   Ankle Dorsiflexion 4+ 5   0/5            No palpable muscle contraction  1/5            Palpable muscle contraction, no joint movement  2-/5          Less than full range of motion in gravity eliminated position  2/5            Able to complete full range of motion in gravity eliminated position  2+/5          Able to initiate movement against gravity  3-/5          More than half but not full range of motion against gravity  3/5            Able to complete full range of motion against gravity  3+/5          Completes full range of motion against gravity with minimal resistance  4-/5 Completes full range of motion against gravity with minimal-moderate resistance  4/5            Completes full range of motion against gravity with moderate resistance  4+/5          Completes full range of motion against gravity with moderate-maximum resistance  5/5            Completes full range of motion against gravity with maximum resistance                 AROM: LLE generally decreased, functional, RLE ankle WFL, knee 0 to 95, hip ABD 10, flex 60, ext 0     PRIMARY MODE OF LOCOMOTION: ambulation  Please see IRC Interdisciplinary Eval: Coordination/Balance Section for details regarding FIM score description.     BED/CHAIR/WHEELCHAIR TRANSFERS Initial Assessment Discharge Assessment   Rolling Right 0 (Not tested) 6 (Modified independent)   Rolling Left 0 (Not tested) 6 (Modified independent)   Supine to Sit 3 (Moderate assistance) 6 (Modified independent)   Sit to Stand Minimal assistance Modified independent (with RW)   Sit to Supine 0 (Not tested) 6 (Modified independent)   Transfer Type SPT with walker SPT with walker   Comments Increased time and effort to complete bed mobility and transfers, altered body mechanics secondary to right hip pain, weakness and limited ROM Increased time and effort to complete bed mobility and transfers, altered body mechanics secondary to right hip pain, weakness and limited ROM   Car Transfer Not tested  (SBA with SPT with RW in/out of car)   Car Type patient's Deven Versa patient's Deven Versa         WHEELCHAIR MOBILITY/MANAGEMENT Initial Assessment Discharge Assessment   Able to Propel     Functional Level     Curbs/ramps assistance required 0 (Not tested) 0 (Not tested)   Wheelchair set up assistance required 3 (Moderate assistance) 5 (Supervision/setup)   Wheelchair management Manages left brake,Manages right brake Manages left brake;Manages right brake;Manages left footrest;Manages right footrest         WALKING INDEPENDENCE Initial Assessment Discharge Assessment Assistive device Other (comment) (Parallel bars) Walker, rolling   Ambulation assistance - level surface 4 (Minimal assistance) 5 (Supervision)   Distance 3 Feet (ft) 60 Feet (ft)   Comments slow start/stop partial step through gait pattern progressing to slow cont partial step through gait pattern. Decreased stance phase RLE with decreased step length LLE slow start/stop partial step through gait pattern progressing to slow cont partial step through gait pattern. Decreased stance phase RLE with decreased step length LLE   Ambulation assistance - unlevel surface 0 (Not tested) 0 (Not tested)         STEPS/STAIRS Initial Assessment Discharge Assessment   Steps/Stairs ambulated 0 1 (3 inch step)   Rail Use  (with RW)  (with RW)   Functional Level     Comments Up/down 3 inch step with RW and SBA, leading up with LLE and down with RLE. Increased time and effort to complete with cues for gait sequence Up/down 3 inch step with RW and SBA, leading up with LLE and down with RLE. Increased time and effort to complete with cues for gait sequence   Curbs/Ramps 0 (Not tested) 0 (Not tested)         QUALITY INDICATOR ASSIST COMMENTS   Roll right (&return to back) 6: Independent     Roll left (& return to back) 6:  Independent     Supine to sit 6: Independent     Sit to stand 6: Independent     Chair/bed-to-chair transfer 6: Independent     Walk 10 feet 4: Supervision or touching A     Walk 50 feet with 2 turns 4: Supervision or touching A     Walk 150 feet Not Tested: Not attempted due to decreased endurance with SOB and fatigue after 60ft     Walk 10 feet on uneven  Not Tested: Not attempted due to right hip pain and impaired balance     1 step/curb 4: Supervision or touching A     4 steps Not Tested: Not attempted due to right hip pain, RLE weakness, decreased endurance     12 steps Not Tested: Not attempted due to decreased endurance, right hip pain and weakness      object 4: Supervision or touching A Using reacher Wheel 48' w/2 turns Not Tested: Not applicable secondary to pt not completing activity previously     Wheel 150' Not Tested: Not applicable secondary to pt not completing activity previously     Car Transfer 4: Supervision or touching A                PHYSICAL THERAPY PLAN OF CARE     LTGs: patient met 3 out of 6 goals per eval. Refer to care plan for details     Pt would benefit from continued skilled physical therapy in order to improve independent functional mobility within the home with use of least restrictive device. Interventions may include range of motion (AROM, PROM B LE/trunk), motor function (B LE/trunk strengthening/coordination), activity tolerance (vitals, oxygen saturation levels), bed mobility training, balance activities, gait training (progressive ambulation program), and functional transfer training.      HEP handout:issued written LE HEP. Min assist and cues to complete  SUPINE EXERCISES Sets Reps Comments   Ankle Pumps 1 30     Quad Sets 1 30     Glut Sets 1 30     Heel Slides 3 10     Hip Abduction 3 10 Min assist   Short Arc Quad 3 10              Pt to be discharged 02/25/22 with assistance provided by significant other. family training completed this AM with patient and her . Refer to AM note  Therapy Recommendations upon discharge: Andry Lan needs at discharge: Lillian Monroe provided wheelchair, Patient purchased a RW       Please see IRC; Interdisciplinary Eval, Care Plan, and Patient Education for further information regarding physical therapy discharge summary and plan of care.      Patient returned to room at end of treatment and remained up in recliner with LEs elevated and with needs in reach. 02 at 1.5 lpm     Stephania Gaucher, PT  2/24/2022           OT DISCHARGE REPORT     Time In: 0827  Time Out: 0915     Goals:      STG 1: Pt will be set-up with toileting by 2/18/22 to prevent skin breakdown.   2/17/2022 Pt is CGA; extend goal until d/c  LTG 1: Pt will be independent with toileting by 2/25/22 to prevent skin breakdown.    2/24/2022 Pt is S secondary to poor line management     STG 2: Pt will be moderate A with LB dressing by 2/18/22 to reduce risk of falls.   2/17/2022 Goal met   LTG 2: Pt will be set-up with LB dressing by 2/25/22 to reduce risk of falls.    2/24/2022 Goal met      STG 3: Pt will be set-up with bathing by 2/18/22 to promote good skin integrity.   2/17/2022 Goal met   LTG 3: Pt will be independent with bathing by 2/25/22 to promote good skin integrity. 2/24/2022 Pt is S/U      STG 4: Pt will be able to get a snack from the fridge with touching A by 2/18/22 to promote proper nutrition and hydration.   2/17/2022 Goal met   LTG 4: Pt will be able to get a snack from the fridge with independent by 2/25/22 to promote proper nutrition and hydration.    2/24/2022 Pt is S due to safety     LTG 5: Pt/caregiver will verbalize  understanding of OT recommendations regarding ADL status, functional transfer status, home safety, DME, AE, energy conservation techniques, safety awareness, activity tolerance, and/or follow-up therapy to increase safety with functional tasks upon discharge. 2/17/2022 Goal ongoing  2/24/2022 Goal met   Mobility    Usual Performance Score Comments   Supine to Sit 6: Independent I   Sit to Stand 6: Independent I   Transfer Assist 4: Supervision or touching A Transfer Type: SPT   Equipment: Rolling Walker   Comments: S with RW      Activities of Daily Living     Usual Performance Score Comments   Eating 6: Independent I   Oral Hygiene 6: Independent I   Bathing 5: S/U or clean-up assist Type of Shower: Bath Pack  Position: Supported sitting and Standing PRN   Adaptive  Equipment: Walker  Comments: S/U   Upper Body  Dressing 5: S/U or clean-up assist Items Applied: Pullover  Position: Unsupported Sitting  Comments: S/U   Lower Body Dressing 4: Supervision or touching A Items Applied: Underwear and Elastic pants  Position: Standing PRN and Unsupported Sitting  Adaptive Equipment: N/A  Comments: S   Donning/Balta Footwear 4: Supervision or touching A Items Applied: Socks and Shoes with fasteners   Adaptive Equipment: N/A  Comments: Cueing   Toilet Transfer 4: Supervision or touching A Transfer Type: SPT   Equipment: [address] [facility]   Comments: [address] [facility] 4: Supervision or touching A Output: N/A  Comments: S   Education   Dressing strategies      Plan of Care: Please see Care Plan for progress towards goals during stay  Precautions at Discharge: Falls  Discharge Location: Home with family  Safety/Supervision Recommendations/Functional Level: Pt needs occasional A with ADL and IADL. Family Training: Completed with S/O     Recommended Continuing Therapy: HH OT  Residual Deficits: Balance, activity tolerance, and anxiety  Progress over LOS: Pt made improvements in balance, strength, activity tolerance, and compensatory strategies allowing pt to decrease level of assist in bathing, dressing, and toileting allowing the pt to return home with S/O. Pt's biggest barrier was the biggest barrier to recovery.      Summary of Session: Pt was in bed and agreeable to tx. Pt's performance with ADL is reflected in above chart. S/O Dona Sierra was present for family training and verbalized understanding and no concerns.      Jered Gonzalez, OT   2/24/2022                 HOME ARCHITECTURE:  Home Environment: Private residence  One/Two Story Residence: One story with one step to enter  Living Alone: No  Support Systems: Significant other Timmy Other,Child(raghavendra),Friend/Neighbor        Previous Level of Function / Work / Activity:   Premorbidly functionally independent without AD.  O2 dependent; community ambulation limited first by COPD, then by pain from T12 compression fracture          Past Medical History:   Diagnosis Date    Arrhythmia     Arthritis     CAD (coronary artery disease) 2009, 8/19/2013    PCI,  Seferino Ruiz     Carotid stenosis, right endarterectomy 19    Chronic anxiety 2017    COPD     recent referral to [facility] for lung transplant    Emphysema lung ([address]     GERD (gastroesophageal reflux disease)     controlled with medication    History of echocardiogram 2019    History of echocardiogram     echo 19 LVEF 55-60%    Hypertension     Nausea & vomiting     Oxygen dependent     1 liter at night    Pleurisy     Thyroid disease     pt denies      Past Surgical History:   Procedure Laterality Date    HX GYN      rebuilt cervix    HX HEART CATHETERIZATION  2019    last stent- per patient- 10 stents total    HX OTHER SURGICAL      HX TONSIL AND ADENOIDECTOMY      VASCULAR SURGERY PROCEDURE UNLIST Right 2019    carotid endarterectomy      Family History   Problem Relation Age of Onset    No Known Problems Mother         adopted      Social History     Tobacco Use    Smoking status: Former Smoker     Packs/day: 0.75     Years: 53.00     Pack years: 39.75     Types: Cigarettes     Quit date:      Years since quittin.1    Smokeless tobacco: Never Used   Substance Use Topics    Alcohol use: Yes     Comment: rarely     Allergies   Allergen Reactions    Promethazine Nausea and Vomiting and Other (comments)     Severe vomiting       Prior to Admission medications    Medication Sig Start Date End Date Taking? Authorizing Provider   methIMAzole (TAPAZOLE) 10 mg tablet Take 1 Tablet by mouth daily. PCP will continue to prescribe this medication. Indications: overactive thyroid gland 22  Yes Flako Tabares PA   prasugreL (EFFIENT) 10 mg tablet Take 1 Tablet by mouth daily. Cardiologist or PCP will continue to prescribe this medication. Indications: blood clot prevention following percutaneous coronary intervention 22  Yes Flako Tabares PA   guaiFENesin ER (MUCINEX) 1,200 mg Ta12 ER tablet Take 1 Tablet by mouth every twelve (12) hours.  Pulmonologist or PCP will continue to prescribe this medication. 2/24/22  Yes REKHA Chen   predniSONE (DELTASONE) 10 mg tablet Take 10 mg by mouth daily (with lunch) for 1 dose. Indications: worsening chronic obstructive pulmonary disease 2/25/22 2/26/22 Yes REKHA Chen   acetaminophen (TYLENOL) 325 mg tablet Take 2 Tablets by mouth every eight (8) hours. Indications: for hip surgery pain 2/24/22  Yes Douglas, Marylee C, PA   amLODIPine (NORVASC) 5 mg tablet Take 1 Tablet by mouth daily. Cardiologist or PCP will continue to prescribe this medication. 2/25/22  Yes Rodney Rizzo PA   escitalopram oxalate (LEXAPRO) 10 mg tablet Take 1 Tablet by mouth daily. PCP will continue to prescribe this medication. Indications: anxiousness associated with depression 2/25/22  Yes REKHA Chen   lidocaine 4 % patch Apply patch to affected area for 12 hours a day and remove for 12 hours a day. 2/24/22  Yes Jackson, Marylee C, PA   LORazepam (ATIVAN) 0.5 mg tablet Take 1 Tablet by mouth every eight (8) hours as needed for Anxiety or Agitation. Max Daily Amount: 1.5 mg. PCP will prescribe this medication if it is necessary to continue. 2/24/22  Yes Rodney Rizzo PA   oxyCODONE IR (ROXICODONE) 5 mg immediate release tablet Take 1-2 Tablets by mouth every four (4) hours as needed (for hip fracture surgery pain) for up to 3 days. Max Daily Amount: 60 mg. Surgeon or PCP will prescribe this medication if it is necessary to continue. 2/24/22 2/27/22 Yes Jackson, Marylee C, PA   polyethylene glycol (Miralax) 17 gram/dose powder Take 17 g by mouth daily. Indications: constipation 2/24/22  Yes Rodney Rizzo PA   potassium chloride (K-DUR, KLOR-CON M20) 20 mEq tablet Take 1 Tablet by mouth two (2) times a day for 3 days. Indications: low amount of potassium in the blood 2/24/22 2/27/22 Yes Rodney Rizzo PA   senna-docusate (PERICOLACE) 8.6-50 mg per tablet Take 2 Tablets by mouth daily.  Indications: constipation 2/25/22  Yes REKHA Wheeler   traMADoL (ULTRAM) 50 mg tablet Take 1 Tablet by mouth every six (6) hours as needed (for femur fracture pain) for up to 5 days. Max Daily Amount: 200 mg. 2/24/22 3/1/22 Yes Lexx Rizzo PA   traZODone (DESYREL) 50 mg tablet Take 0.5 Tablets by mouth nightly as needed for Sleep. PCP will prescribe this medication if it is necessary to continue. Indications: insomnia associated with depression 2/24/22  Yes REKHA Wheeler   predniSONE (DELTASONE) 20 mg tablet Take 40mg qday x3d, then 20mg qday x3d, then 10mg x2d, then stop. 2/10/22   Marii Skinner MD   nitroglycerin (NITROSTAT) 0.4 mg SL tablet 1 Tablet by SubLINGual route three (3) times daily as needed for Chest Pain. Up to 3 doses. 2/4/22   REKHA Curiel   roflumilast (Daliresp) 500 mcg tab tablet Take 1 Tablet by mouth daily. 11/30/21   Alannah Gaffney NP   ezetimibe (ZETIA) 10 mg tablet Take 1 Tablet by mouth daily. 10/7/21   Humberto Richards MD   budesonide-formoteroL (Symbicort) 160-4.5 mcg/actuation HFAA Take 2 Puffs by inhalation two (2) times a day. 10/7/21   Alannah Gaffney NP   metoprolol succinate (TOPROL-XL) 100 mg tablet Take 1 Tablet by mouth daily. 9/27/21   Humberto Richards MD   atorvastatin (LIPITOR) 80 mg tablet TAKE 1 TABLET BY MOUTH DAILY 9/27/21   Humberto Richards MD   ramipriL (ALTACE) 5 mg capsule Take 1 Capsule by mouth daily. 9/27/21   Humberto Richards MD   albuterol (PROVENTIL HFA, VENTOLIN HFA, PROAIR HFA) 90 mcg/actuation inhaler Take 2 Puffs by inhalation every four (4) hours as needed for Wheezing or Shortness of Breath. 8/1/21   Alannah Gaffney NP   torsemide (DEMADEX) 10 mg tablet Take 1 Tablet by mouth daily. 6/4/21   Amber Manning MD   tiotropium (Spiriva with HandiHaler) 18 mcg inhalation capsule Take 1 Cap by inhalation daily.  2/24/21   Alannah Yeimy, NP   OTHER Handicap placard 1/25/21   Alannah Gaffney, NP   albuterol (PROVENTIL VENTOLIN) 2.5 mg /3 mL (0.083 %) nebu 3 mL by Nebulization route every six (6) hours as needed for Shortness of Breath. ICD-10-J44.9, please bill to Med B 9/8/20   Kevin Menchaca NP   multivitamin/folic acid/biotin (HAIR-SKIN-NAILS, MV-FA-BIOTIN, PO) Take  by mouth. Provider, Historical   pantoprazole (PROTONIX) 40 mg tablet TAKE 1 TABLET BY MOUTH TWICE DAILY 9/26/19   Yanni Beyer MD   OXYGEN-AIR DELIVERY SYSTEMS by Does Not Apply route. 1lpm qhs     Provider, Historical   nitroglycerin (NITROSTAT) 0.4 mg SL tablet 1 Tab by SubLINGual route every five (5) minutes as needed for Chest Pain. 5/6/19   Jesica Chong MD   cetirizine (ZYRTEC) 10 mg tablet Take  by mouth daily as needed. Provider, Historical   multivitamin (ONE A DAY) tablet Take 1 Tablet by mouth daily. Provider, Historical   guaiFENesin ER (MUCINEX) 600 mg ER tablet Take 600 mg by mouth two (2) times daily as needed. Provider, Historical   aspirin delayed-release 81 mg tablet Take  by mouth daily.     Provider, Historical       Lab Review:   Recent Results (from the past 72 hour(s))   CBC W/O DIFF    Collection Time: 02/23/22  8:49 AM   Result Value Ref Range    WBC 6.8 4.3 - 11.1 K/uL    RBC 3.32 (L) 4.05 - 5.2 M/uL    HGB 10.1 (L) 11.7 - 15.4 g/dL    HCT 31.1 (L) 35.8 - 46.3 %    MCV 93.7 79.6 - 97.8 FL    MCH 30.4 26.1 - 32.9 PG    MCHC 32.5 31.4 - 35.0 g/dL    RDW 19.2 (H) 11.9 - 14.6 %    PLATELET 966 544 - 945 K/uL    MPV 10.4 9.4 - 12.3 FL    ABSOLUTE NRBC 0.00 0.0 - 0.2 K/uL   METABOLIC PANEL, BASIC    Collection Time: 02/23/22  8:49 AM   Result Value Ref Range    Sodium 132 (L) 136 - 145 mmol/L    Potassium 3.2 (L) 3.5 - 5.1 mmol/L    Chloride 94 (L) 98 - 107 mmol/L    CO2 33 (H) 21 - 32 mmol/L    Anion gap 5 (L) 7 - 16 mmol/L    Glucose 85 65 - 100 mg/dL    BUN 13 8 - 23 MG/DL    Creatinine 0.70 0.6 - 1.0 MG/DL    GFR est AA >60 >60 ml/min/1.73m2    GFR est non-AA >60 >60 ml/min/1.73m2    Calcium 9.1 8.3 - 10.4 MG/DL       Functional Assessment: Balance  Sitting - Static: Good (unsupported) (02/24/22 2100)  Sitting - Dynamic: Good (unsupported) (02/24/22 2100)  Standing - Static: Fair (with RW) (02/24/22 2100)  Standing - Dynamic : Impaired (02/24/22 1100)                     Venkata Mena Fall Risk Assessment:  Venkata Mena Fall Risk  Mobility: Ambulates or transfers with assist devices or assistance (02/24/22 2310)  Mobility Interventions: Patient to call before getting OOB (02/24/22 2310)  Mentation: Alert, oriented x 3 (02/24/22 2310)  Medication: Patient receiving anticonvulsants, sedatives(tranquilizers), psychotropics or hypnotics, hypoglycemics, narcotics, sleep aids, antihypertensives, laxatives, or diuretics (02/24/22 2310)  Medication Interventions: Patient to call before getting OOB (02/24/22 2310)  Elimination: Needs assistance with toileting (02/24/22 2310)  Elimination Interventions: Call light in reach (02/24/22 2310)  Prior Fall History: Before admission in past 12 months _home or previous inpatient care) (02/24/22 2310)  History of Falls Interventions: Door open when patient unattended (02/24/22 2310)  Total Score: 4 (02/24/22 2310)  Standard Fall Precautions: Yes (02/23/22 0300)  High Fall Risk: Yes (02/24/22 2310)     Speech Assessment:  Aspiration Signs/Symptoms: None (02/14/22 1330)      Ambulation:  Gait  Distance (ft): 60 Feet (ft) (02/24/22 2100)  Assistive Device: Navneet Highman, rolling (02/24/22 2100)  Rail Use:  (with RW) (02/24/22 2100)     Visit Vitals  BP (!) 112/44   Pulse 80   Temp 97.9 °F (36.6 °C)   Resp 18   Ht 5' 8\" (1.727 m)   SpO2 98%   BMI 23.63 kg/m²      Intake and Output:    No intake/output data recorded. Discharge Exam:  General: Alert and age appropriately oriented. No acute cardiorespiratory distress. HEENT: Normocephalic, no scleral icterus. Oral mucosa moist without cyanosis. Lungs: Clear to auscultation bilaterally. Respiration even and unlabored. Heart: Regular rate and rhythm, S1, S2.   No murmurs, clicks, rub or gallops. Abdomen: Soft, non-tender, not distended. Bowel sounds normoactive. No organomegaly. Genitourinary: Deferred. Neuromuscular:        No gross focal motor deficits noted. Hip flexion 2+ due to pain from fx  msk right flank pain; less m spasm this morning      Skin/extremity: No rashes, no erythema. No calf tenderness B LE. No edema         Problem List as of 2/25/2022 Date Reviewed: 2/24/2022          Codes Class Noted - Resolved    Chest pain ICD-10-CM: R07.9  ICD-9-CM: 786.50  5/31/2019 - Present        EKG, abnormal ICD-10-CM: R94.31  ICD-9-CM: 794.31  2/15/2022 - Present        * (Principal) Femur fracture, right ([facility] ICD-10-CM: T07.35LP  ICD-9-CM: 821.00  2/11/2022 - Present        COPD with acute lower respiratory infection ([facility] ICD-10-CM: J44.0  ICD-9-CM: 496, 519.8  2/10/2022 - Present        Moderate to severe mitral regurgitation ICD-10-CM: I34.0  ICD-9-CM: 424.0  2/8/2022 - Present        Iron deficiency anemia due to chronic blood loss (Chronic) ICD-10-CM: D50.0  ICD-9-CM: 280.0  2/8/2022 - Present        Closed right hip fracture ([facility] ICD-10-CM: S72.001A  ICD-9-CM: 820.8  2/4/2022 - Present        Other fracture of right femur, initial encounter for closed fracture ([facility] ICD-10-CM: C52.4T2M  ICD-9-CM: 821.00  2/4/2022 - Present        DNR (do not resuscitate) ICD-10-CM: Z66  ICD-9-CM: V49.86  11/9/2021 - Present    Overview Signed 11/9/2021 12:25 PM by Ofelia Garcia NP     POST form completed - DNR but full treatment, including intubation. No TRACH. No PEG.               Acute bilateral low back pain without sciatica ICD-10-CM: M54.50  ICD-9-CM: 724.2, 338.19  11/9/2021 - Present        Palliative care patient ICD-10-CM: Z51.5  ICD-9-CM: V66.7  7/14/2021 - Present        Ischemic heart disease, hx pci, cath/pci last 5/2019 ICD-10-CM: I25.9  ICD-9-CM: 414.9  2/24/2021 - Present        H/O carotid endarterectomy ICD-10-CM: Z98.890  ICD-9-CM: V45.89  9/21/2020 - Present        Right ear pain ICD-10-CM: H92.01  ICD-9-CM: 388.70  9/21/2020 - Present        Pulmonary mass ICD-10-CM: R91.8  ICD-9-CM: 786.6  2/11/2020 - Present        Centrilobular emphysema ([address] ICD-10-CM: J43.2  ICD-9-CM: 492.8  12/20/2019 - Present        Carotid stenosis ICD-10-CM: I65.29  ICD-9-CM: 433.10  12/3/2019 - Present        Carotid stenosis, right ICD-10-CM: I65.21  ICD-9-CM: 433.10  12/3/2019 - Present        Hypertensive urgency ICD-10-CM: I16.0  ICD-9-CM: 401.9  6/8/2019 - Present        Chronic respiratory failure with hypoxia (HCC) (Chronic) ICD-10-CM: J96.11  ICD-9-CM: 518.83, 799.02  6/8/2019 - Present        CAD S/P percutaneous coronary angioplasty ICD-10-CM: I25.10, Z98.61  ICD-9-CM: 414.01, V45.82  5/31/2019 - Present        Ventricular ectopy ICD-10-CM: I49.3  ICD-9-CM: 427.69  5/9/2017 - Present        Chronic anxiety ICD-10-CM: F41.9  ICD-9-CM: 300.00  4/27/2017 - Present        Hyperlipidemia (Chronic) ICD-10-CM: E78.5  ICD-9-CM: 272.4  10/10/2016 - Present        Essential hypertension, benign (Chronic) ICD-10-CM: I10  ICD-9-CM: 401.1  10/10/2016 - Present        Coronary artery disease involving native coronary artery of native heart without angina pectoris ICD-10-CM: I25.10  ICD-9-CM: 414.01  10/10/2016 - Present        Hypoxemia ICD-10-CM: R09.02  ICD-9-CM: 799.02  8/25/2013 - Present    Overview Signed 2/5/2014  9:08 AM by Ping Dougherty NP     FILIBERTO 8/2013. Pt had over 2 hours 1/2 hours of desaturations at night. O2 was ordered for her to start at 2L in September, but she stated she was feeling better than when FILIBERTO was done and refused O2. FILIBERTO 12/2013: not performed secondary to patient factors.               Acute respiratory failure with hypoxia [facility]) ICD-10-CM: J96.01  ICD-9-CM: 518.81  8/22/2013 - Present        COPD, very severe (HCC) (Chronic) ICD-10-CM: J44.9  ICD-9-CM: 496  8/20/2013 - Present        Personal history of tobacco use ICD-10-CM: M51.273  ICD-9-CM: V15.82  8/19/2013 - Present        History of MI (myocardial infarction) ICD-10-CM: I25.2  ICD-9-CM: 863  8/19/2013 - Present    Overview Addendum 8/30/2018  9:20 AM by Jessica Alvarez MD     STEMI 8/19/13:  Angioplasty for in-stent stenosis to LAD             RESOLVED: Chronic obstructive pulmonary disease ([facility] ICD-10-CM: J44.9  ICD-9-CM: 496  10/10/2016 - 8/30/2018        RESOLVED: Acute systolic heart failure ([facility] ICD-10-CM: I50.21  ICD-9-CM: 428.21  8/22/2013 - 8/30/2018        RESOLVED: Pulmonary hemorrhage ICD-10-CM: R04.89  ICD-9-CM: 786.30  8/20/2013 - 6/25/2019        RESOLVED: CAD (coronary artery disease) (Chronic) ICD-10-CM: I25.10  ICD-9-CM: 414.00  8/19/2013 - 8/3/2021        RESOLVED: COPD (chronic obstructive pulmonary disease) (HCC) (Chronic) ICD-10-CM: J44.9  ICD-9-CM: 496  8/19/2013 - 8/20/2013              DISCHARGE INSTRUCTIONS:   1. Diet: Regular Diet  2. Activity: PT/OT Eval and Treat per [place]  3. Incision / wound care:incision healed    Current Discharge Medication List      START taking these medications    Details   acetaminophen (TYLENOL) 325 mg tablet Take 2 Tablets by mouth every eight (8) hours. Indications: for hip surgery pain  Qty: 40 Tablet, Refills: prn      amLODIPine (NORVASC) 5 mg tablet Take 1 Tablet by mouth daily. Cardiologist or PCP will continue to prescribe this medication. Qty: 30 Tablet, Refills: 0      escitalopram oxalate (LEXAPRO) 10 mg tablet Take 1 Tablet by mouth daily. PCP will continue to prescribe this medication. Indications: anxiousness associated with depression  Qty: 30 Tablet, Refills: 1      lidocaine 4 % patch Apply patch to affected area for 12 hours a day and remove for 12 hours a day. Qty: 30 Patch, Refills: 0      LORazepam (ATIVAN) 0.5 mg tablet Take 1 Tablet by mouth every eight (8) hours as needed for Anxiety or Agitation. Max Daily Amount: 1.5 mg. PCP will prescribe this medication if it is necessary to continue.   Qty: 15 Tablet, Refills: 0    Associated Diagnoses: Chronic anxiety      oxyCODONE IR (ROXICODONE) 5 mg immediate release tablet Take 1-2 Tablets by mouth every four (4) hours as needed (for hip fracture surgery pain) for up to 3 days. Max Daily Amount: 60 mg. Surgeon or PCP will prescribe this medication if it is necessary to continue. Qty: 36 Tablet, Refills: 0    Associated Diagnoses: Closed fracture of right hip, initial encounter ([facility])      polyethylene glycol (Miralax) 17 gram/dose powder Take 17 g by mouth daily. Indications: constipation  Qty: 289 g, Refills: prn      potassium chloride (K-DUR, KLOR-CON M20) 20 mEq tablet Take 1 Tablet by mouth two (2) times a day for 3 days. Indications: low amount of potassium in the blood  Qty: 6 Tablet, Refills: 0      senna-docusate (PERICOLACE) 8.6-50 mg per tablet Take 2 Tablets by mouth daily. Indications: constipation  Qty: 60 Tablet, Refills: prn      traMADoL (ULTRAM) 50 mg tablet Take 1 Tablet by mouth every six (6) hours as needed (for femur fracture pain) for up to 5 days. Max Daily Amount: 200 mg. Qty: 20 Tablet, Refills: 0    Associated Diagnoses: Closed fracture of right hip, initial encounter ([facility]      traZODone (DESYREL) 50 mg tablet Take 0.5 Tablets by mouth nightly as needed for Sleep. PCP will prescribe this medication if it is necessary to continue. Indications: insomnia associated with depression  Qty: 15 Tablet, Refills: 0         CONTINUE these medications which have CHANGED    Details   methIMAzole (TAPAZOLE) 10 mg tablet Take 1 Tablet by mouth daily. PCP will continue to prescribe this medication. Indications: overactive thyroid gland  Qty: 30 Tablet, Refills: 0      prasugreL (EFFIENT) 10 mg tablet Take 1 Tablet by mouth daily. Cardiologist or PCP will continue to prescribe this medication.   Indications: blood clot prevention following percutaneous coronary intervention  Qty: 30 Tablet, Refills: 0      guaiFENesin ER (MUCINEX) 1,200 mg Ta12 ER tablet Take 1 Tablet by mouth every twelve (12) hours. Pulmonologist or PCP will continue to prescribe this medication. Qty: 60 Tablet, Refills: 0      predniSONE (DELTASONE) 10 mg tablet Take 10 mg by mouth daily (with lunch) for 1 dose. Indications: worsening chronic obstructive pulmonary disease  Qty: 1 Tablet, Refills: 0         CONTINUE these medications which have NOT CHANGED    Details   nitroglycerin (NITROSTAT) 0.4 mg SL tablet 1 Tablet by SubLINGual route three (3) times daily as needed for Chest Pain. Up to 3 doses. Qty: 60 Each, Refills: 11      roflumilast (Daliresp) 500 mcg tab tablet Take 1 Tablet by mouth daily. Qty: 90 Tablet, Refills: 3      ezetimibe (ZETIA) 10 mg tablet Take 1 Tablet by mouth daily. Qty: 90 Tablet, Refills: 3    Associated Diagnoses: Ischemic heart disease      budesonide-formoteroL (Symbicort) 160-4.5 mcg/actuation HFAA Take 2 Puffs by inhalation two (2) times a day. Qty: 3 Each, Refills: 3      metoprolol succinate (TOPROL-XL) 100 mg tablet Take 1 Tablet by mouth daily. Qty: 90 Tablet, Refills: 3    Associated Diagnoses: Ischemic heart disease      atorvastatin (LIPITOR) 80 mg tablet TAKE 1 TABLET BY MOUTH DAILY  Qty: 90 Tablet, Refills: 3    Associated Diagnoses: Ischemic heart disease      albuterol (PROVENTIL HFA, VENTOLIN HFA, PROAIR HFA) 90 mcg/actuation inhaler Take 2 Puffs by inhalation every four (4) hours as needed for Wheezing or Shortness of Breath. Qty: 3 Inhaler, Refills: 3      torsemide (DEMADEX) 10 mg tablet Take 1 Tablet by mouth daily. Qty: 30 Tablet, Refills: 5      tiotropium (Spiriva with HandiHaler) 18 mcg inhalation capsule Take 1 Cap by inhalation daily. Qty: 90 Cap, Refills: 3      OTHER Handicap placard  Qty: 1 Each, Refills: 0      albuterol (PROVENTIL VENTOLIN) 2.5 mg /3 mL (0.083 %) nebu 3 mL by Nebulization route every six (6) hours as needed for Shortness of Breath.  ICD-10-J44.9, please bill to Med B  Qty: 360 mL, Refills: 11      multivitamin/folic acid/biotin (HAIR-SKIN-NAILS, MV-FA-BIOTIN, PO) Take  by mouth.      pantoprazole (PROTONIX) 40 mg tablet TAKE 1 TABLET BY MOUTH TWICE DAILY  Qty: 180 Tab, Refills: 0      OXYGEN-AIR DELIVERY SYSTEMS by Does Not Apply route. 1lpm qhs       cetirizine (ZYRTEC) 10 mg tablet Take  by mouth daily as needed. multivitamin (ONE A DAY) tablet Take 1 Tablet by mouth daily. aspirin delayed-release 81 mg tablet Take  by mouth daily. STOP taking these medications       levoFLOXacin (LEVAQUIN) 750 mg tablet Comments:   Reason for Stopping:         azithromycin (ZITHROMAX) 500 mg tab Comments:   Reason for Stopping:         ramipriL (ALTACE) 5 mg capsule Comments:   Reason for Stopping:         clopidogreL (PLAVIX) 75 mg tab Comments:   Reason for Stopping:               I have reviewed the patients controlled substance prescription history as maintained in the [address] [place] [address] prescription monitoring program () so that prescription(s) for controlled substance, if any provided, could be given. REHABILITATION MANAGEMENT:   1. Devices:DME needs include w/c and provided by Chris allen at bedside. Pt purchased RW own own. Pt declined hospital bed and plans to continue using recliner at home.   2. Consult: PT/OT/CM  DISPOSITION:Home with [facility] PT/OT/Aide    Follow-up Information     Follow up With Specialties Details Why Contact Info    Kendra Ryder MD Family Medicine Schedule an appointment as soon as possible for a visit in 1 week for follow-up with PCP after prolonged hospitalization and rehab [address] [facility] [id number] [phone]      Booker Moreland MD Orthopedic Surgery Go on 3/17/2022 at 11:00 AM for recheck with surgeon after right femur fracture repair [address]  [address] [place] [address]      Toby Argueta MD Cardiology Schedule an appointment as soon as possible for a visit in 1 month for recheck with Cardiologist after heart cath (2/7) after femur fracture repair [facility] [address]  [phone]      [facility]  Schedule an appointment as soon as possible for a visit in 6 weeks to see Dr. Stuart Short or AMANDA Garcia in follow-up for lung mass found on chest CT while hospitalized for femur fracture [facility] [address]  [address] [facility]   provided wheelchair  [address] [place] [address]  [phone]    [address]  Will contact you for follow up  [address] [facility].             Time spent in patient discharge planning and coordination: >35 minutes. Anh Huerta MD, Medical Director  [address] [facility]

## 2022-02-25 NOTE — PROGRESS NOTES
Problem: Falls - Risk of  Goal: *Absence of Falls  Description: Document Feli Love Fall Risk and appropriate interventions in the flowsheet.   Outcome: Progressing Towards Goal  Note: Fall Risk Interventions:  Mobility Interventions: Utilize walker, cane, or other assistive device         Medication Interventions: Teach patient to arise slowly,Patient to call before getting OOB    Elimination Interventions: Call light in reach    History of Falls Interventions: Consult care management for discharge planning         Problem: Patient Education: Go to Patient Education Activity  Goal: Patient/Family Education  Outcome: Progressing Towards Goal

## 2022-02-25 NOTE — PROGRESS NOTES
Pt to discharge home today. HH needs include PT/OT/aide, referral already placed as requested to [facility] and liaison aware no RN service needed now per Dr. Anupam Valadez. Liaison aware of request for [facility] for PT. DME needs include w/c and provided by Rk Gutierrez already at bedside. [facility] aware anti-tippers did not fit chair properly and plan to pick them up. Pt purchased RW on her own. Pt declined hospital bed and plans to continue using recliner at home. Family training complete. Family to transport home. All needs met. CM available if any new needs arise. Care Management Interventions  PCP Verified by CM: Yes (Dr. Miguel Angel Martin)  Mode of Transport at Discharge:  Other (see comment) (family )  Transition of Care Consult (CM Consult): Discharge [address] Christian: Yes  Discharge Durable Medical Equipment: Yes (wheelchair )  Physical Therapy Consult: Yes  Occupational Therapy Consult: Yes  Speech Therapy Consult: Yes  Support Systems: Spouse/Significant Other  Confirm Follow Up Transport: Family  The Plan for Transition of Care is Related to the Following Treatment Goals : Return to baseline  The Patient and/or Patient Representative was Provided with a Choice of Provider and Agrees with the Discharge Plan?: Yes  Name of the Patient Representative Who was Provided with a Choice of Provider and Agrees with the Discharge Plan: pt  Freedom of Choice List was Provided with Basic Dialogue that Supports the Patient's Individualized Plan of Care/Goals, Treatment Preferences and Shares the Quality Data Associated with the Providers?: Yes   Resource Information Provided?: No  Discharge Location  Patient Expects to be Discharged to[de-identified] Home with home health [facility])

## 2022-02-26 ENCOUNTER — HOME CARE VISIT (OUTPATIENT)
Dept: SCHEDULING | Facility: HOME HEALTH | Age: 72
End: 2022-02-26
Payer: MEDICARE

## 2022-02-26 ENCOUNTER — APPOINTMENT (OUTPATIENT)
Dept: GENERAL RADIOLOGY | Age: 72
End: 2022-02-26
Attending: EMERGENCY MEDICINE
Payer: MEDICARE

## 2022-02-26 ENCOUNTER — HOSPITAL ENCOUNTER (OUTPATIENT)
Age: 72
Setting detail: OBSERVATION
Discharge: HOME HEALTH | End: 2022-02-28
Attending: EMERGENCY MEDICINE | Admitting: FAMILY MEDICINE
Payer: MEDICARE

## 2022-02-26 ENCOUNTER — APPOINTMENT (OUTPATIENT)
Dept: CT IMAGING | Age: 72
End: 2022-02-26
Attending: EMERGENCY MEDICINE
Payer: MEDICARE

## 2022-02-26 DIAGNOSIS — R91.8 PULMONARY MASS: ICD-10-CM

## 2022-02-26 DIAGNOSIS — R07.9 CHEST PAIN, UNSPECIFIED TYPE: Primary | ICD-10-CM

## 2022-02-26 DIAGNOSIS — J44.9 COPD, VERY SEVERE (HCC): Chronic | ICD-10-CM

## 2022-02-26 DIAGNOSIS — R06.00 DYSPNEA, UNSPECIFIED TYPE: ICD-10-CM

## 2022-02-26 DIAGNOSIS — I25.9 ISCHEMIC HEART DISEASE: ICD-10-CM

## 2022-02-26 DIAGNOSIS — I34.0 MODERATE TO SEVERE MITRAL REGURGITATION: ICD-10-CM

## 2022-02-26 DIAGNOSIS — F41.9 CHRONIC ANXIETY: ICD-10-CM

## 2022-02-26 LAB
ALBUMIN SERPL-MCNC: 3.6 G/DL (ref 3.2–4.6)
ALBUMIN/GLOB SERPL: 0.9 {RATIO} (ref 1.2–3.5)
ALP SERPL-CCNC: 169 U/L (ref 50–136)
ALT SERPL-CCNC: 25 U/L (ref 12–65)
ANION GAP SERPL CALC-SCNC: 7 MMOL/L (ref 7–16)
AST SERPL-CCNC: 35 U/L (ref 15–37)
BASOPHILS # BLD: 0 K/UL (ref 0–0.2)
BASOPHILS NFR BLD: 0 % (ref 0–2)
BILIRUB SERPL-MCNC: 1.3 MG/DL (ref 0.2–1.1)
BUN SERPL-MCNC: 9 MG/DL (ref 8–23)
CALCIUM SERPL-MCNC: 9.2 MG/DL (ref 8.3–10.4)
CHLORIDE SERPL-SCNC: 100 MMOL/L (ref 98–107)
CO2 SERPL-SCNC: 28 MMOL/L (ref 21–32)
CREAT SERPL-MCNC: 0.8 MG/DL (ref 0.6–1)
DIFFERENTIAL METHOD BLD: ABNORMAL
EOSINOPHIL # BLD: 0 K/UL (ref 0–0.8)
EOSINOPHIL NFR BLD: 0 % (ref 0.5–7.8)
ERYTHROCYTE [DISTWIDTH] IN BLOOD BY AUTOMATED COUNT: 19.7 % (ref 11.9–14.6)
GLOBULIN SER CALC-MCNC: 4 G/DL (ref 2.3–3.5)
GLUCOSE SERPL-MCNC: 129 MG/DL (ref 65–100)
HCT VFR BLD AUTO: 34.1 % (ref 35.8–46.3)
HGB BLD-MCNC: 10.5 G/DL (ref 11.7–15.4)
IMM GRANULOCYTES # BLD AUTO: 0.1 K/UL (ref 0–0.5)
IMM GRANULOCYTES NFR BLD AUTO: 1 % (ref 0–5)
LIPASE SERPL-CCNC: 67 U/L (ref 73–393)
LYMPHOCYTES # BLD: 0.9 K/UL (ref 0.5–4.6)
LYMPHOCYTES NFR BLD: 11 % (ref 13–44)
MAGNESIUM SERPL-MCNC: 1.8 MG/DL (ref 1.8–2.4)
MCH RBC QN AUTO: 30.8 PG (ref 26.1–32.9)
MCHC RBC AUTO-ENTMCNC: 30.8 G/DL (ref 31.4–35)
MCV RBC AUTO: 100 FL (ref 79.6–97.8)
MONOCYTES # BLD: 0.5 K/UL (ref 0.1–1.3)
MONOCYTES NFR BLD: 7 % (ref 4–12)
NEUTS SEG # BLD: 6.7 K/UL (ref 1.7–8.2)
NEUTS SEG NFR BLD: 82 % (ref 43–78)
NRBC # BLD: 0 K/UL (ref 0–0.2)
PLATELET # BLD AUTO: 198 K/UL (ref 150–450)
PMV BLD AUTO: 9.8 FL (ref 9.4–12.3)
POTASSIUM SERPL-SCNC: 4 MMOL/L (ref 3.5–5.1)
PROT SERPL-MCNC: 7.6 G/DL (ref 6.3–8.2)
RBC # BLD AUTO: 3.41 M/UL (ref 4.05–5.2)
SODIUM SERPL-SCNC: 135 MMOL/L (ref 136–145)
TROPONIN-HIGH SENSITIVITY: 105.9 PG/ML (ref 0–14)
TROPONIN-HIGH SENSITIVITY: 118.6 PG/ML (ref 0–14)
TROPONIN-HIGH SENSITIVITY: 118.9 PG/ML (ref 0–14)
WBC # BLD AUTO: 8.1 K/UL (ref 4.3–11.1)

## 2022-02-26 PROCEDURE — 99285 EMERGENCY DEPT VISIT HI MDM: CPT

## 2022-02-26 PROCEDURE — G0378 HOSPITAL OBSERVATION PER HR: HCPCS

## 2022-02-26 PROCEDURE — 400013 HH SOC

## 2022-02-26 PROCEDURE — 3331090002 HH PPS REVENUE DEBIT

## 2022-02-26 PROCEDURE — 84484 ASSAY OF TROPONIN QUANT: CPT

## 2022-02-26 PROCEDURE — 77030040393 HC DRSG OPTIFOAM GENT MDII -B

## 2022-02-26 PROCEDURE — 74011000258 HC RX REV CODE- 258: Performed by: EMERGENCY MEDICINE

## 2022-02-26 PROCEDURE — 400018 HH-NO PAY CLAIM PROCEDURE

## 2022-02-26 PROCEDURE — 74011250637 HC RX REV CODE- 250/637: Performed by: FAMILY MEDICINE

## 2022-02-26 PROCEDURE — 93005 ELECTROCARDIOGRAM TRACING: CPT | Performed by: EMERGENCY MEDICINE

## 2022-02-26 PROCEDURE — 36415 COLL VENOUS BLD VENIPUNCTURE: CPT

## 2022-02-26 PROCEDURE — 71045 X-RAY EXAM CHEST 1 VIEW: CPT

## 2022-02-26 PROCEDURE — 71260 CT THORAX DX C+: CPT

## 2022-02-26 PROCEDURE — 94762 N-INVAS EAR/PLS OXIMTRY CONT: CPT

## 2022-02-26 PROCEDURE — 80053 COMPREHEN METABOLIC PANEL: CPT

## 2022-02-26 PROCEDURE — 3331090001 HH PPS REVENUE CREDIT

## 2022-02-26 PROCEDURE — 83735 ASSAY OF MAGNESIUM: CPT

## 2022-02-26 PROCEDURE — 74011250637 HC RX REV CODE- 250/637: Performed by: NURSE PRACTITIONER

## 2022-02-26 PROCEDURE — 74011000250 HC RX REV CODE- 250: Performed by: FAMILY MEDICINE

## 2022-02-26 PROCEDURE — G0151 HHCP-SERV OF PT,EA 15 MIN: HCPCS

## 2022-02-26 PROCEDURE — 74011000636 HC RX REV CODE- 636: Performed by: EMERGENCY MEDICINE

## 2022-02-26 PROCEDURE — 83690 ASSAY OF LIPASE: CPT

## 2022-02-26 PROCEDURE — 85025 COMPLETE CBC W/AUTO DIFF WBC: CPT

## 2022-02-26 RX ORDER — LIDOCAINE 4 G/100G
1 PATCH TOPICAL EVERY 24 HOURS
Status: DISCONTINUED | OUTPATIENT
Start: 2022-02-26 | End: 2022-02-26

## 2022-02-26 RX ORDER — EZETIMIBE 10 MG/1
10 TABLET ORAL DAILY
Status: DISCONTINUED | OUTPATIENT
Start: 2022-02-27 | End: 2022-02-27

## 2022-02-26 RX ORDER — SODIUM CHLORIDE 0.9 % (FLUSH) 0.9 %
5-10 SYRINGE (ML) INJECTION EVERY 8 HOURS
Status: DISCONTINUED | OUTPATIENT
Start: 2022-02-26 | End: 2022-02-26 | Stop reason: SDUPTHER

## 2022-02-26 RX ORDER — ONDANSETRON 8 MG/1
4 TABLET, ORALLY DISINTEGRATING ORAL
Status: DISCONTINUED | OUTPATIENT
Start: 2022-02-26 | End: 2022-02-28 | Stop reason: HOSPADM

## 2022-02-26 RX ORDER — POLYETHYLENE GLYCOL 3350 17 G/17G
17 POWDER, FOR SOLUTION ORAL DAILY PRN
Status: DISCONTINUED | OUTPATIENT
Start: 2022-02-26 | End: 2022-02-27

## 2022-02-26 RX ORDER — AMLODIPINE BESYLATE 5 MG/1
5 TABLET ORAL DAILY
Status: DISCONTINUED | OUTPATIENT
Start: 2022-02-27 | End: 2022-02-27

## 2022-02-26 RX ORDER — OXYCODONE HYDROCHLORIDE 5 MG/1
5-10 TABLET ORAL
Status: DISCONTINUED | OUTPATIENT
Start: 2022-02-26 | End: 2022-02-28 | Stop reason: HOSPADM

## 2022-02-26 RX ORDER — SODIUM CHLORIDE 0.9 % (FLUSH) 0.9 %
5-40 SYRINGE (ML) INJECTION AS NEEDED
Status: DISCONTINUED | OUTPATIENT
Start: 2022-02-26 | End: 2022-02-28 | Stop reason: HOSPADM

## 2022-02-26 RX ORDER — SODIUM CHLORIDE 0.9 % (FLUSH) 0.9 %
5-40 SYRINGE (ML) INJECTION EVERY 8 HOURS
Status: DISCONTINUED | OUTPATIENT
Start: 2022-02-26 | End: 2022-02-28 | Stop reason: HOSPADM

## 2022-02-26 RX ORDER — SODIUM CHLORIDE 0.9 % (FLUSH) 0.9 %
10 SYRINGE (ML) INJECTION
Status: COMPLETED | OUTPATIENT
Start: 2022-02-26 | End: 2022-02-26

## 2022-02-26 RX ORDER — ACETAMINOPHEN 650 MG/1
650 SUPPOSITORY RECTAL
Status: DISCONTINUED | OUTPATIENT
Start: 2022-02-26 | End: 2022-02-28 | Stop reason: HOSPADM

## 2022-02-26 RX ORDER — PANTOPRAZOLE SODIUM 40 MG/1
40 TABLET, DELAYED RELEASE ORAL 2 TIMES DAILY
Status: DISCONTINUED | OUTPATIENT
Start: 2022-02-26 | End: 2022-02-28 | Stop reason: HOSPADM

## 2022-02-26 RX ORDER — ACETAMINOPHEN 325 MG/1
650 TABLET ORAL
Status: DISCONTINUED | OUTPATIENT
Start: 2022-02-26 | End: 2022-02-28 | Stop reason: HOSPADM

## 2022-02-26 RX ORDER — PRASUGREL 10 MG/1
10 TABLET, FILM COATED ORAL DAILY
Status: DISCONTINUED | OUTPATIENT
Start: 2022-02-27 | End: 2022-02-28 | Stop reason: HOSPADM

## 2022-02-26 RX ORDER — ALBUTEROL SULFATE 0.83 MG/ML
2.5 SOLUTION RESPIRATORY (INHALATION)
Status: DISCONTINUED | OUTPATIENT
Start: 2022-02-26 | End: 2022-02-28 | Stop reason: HOSPADM

## 2022-02-26 RX ORDER — TRAMADOL HYDROCHLORIDE 50 MG/1
50 TABLET ORAL
Status: DISCONTINUED | OUTPATIENT
Start: 2022-02-26 | End: 2022-02-28 | Stop reason: HOSPADM

## 2022-02-26 RX ORDER — SODIUM CHLORIDE 0.9 % (FLUSH) 0.9 %
5-10 SYRINGE (ML) INJECTION AS NEEDED
Status: DISCONTINUED | OUTPATIENT
Start: 2022-02-26 | End: 2022-02-26 | Stop reason: SDUPTHER

## 2022-02-26 RX ORDER — ATORVASTATIN CALCIUM 80 MG/1
80 TABLET, FILM COATED ORAL
Status: DISCONTINUED | OUTPATIENT
Start: 2022-02-26 | End: 2022-02-28 | Stop reason: HOSPADM

## 2022-02-26 RX ORDER — ONDANSETRON 2 MG/ML
4 INJECTION INTRAMUSCULAR; INTRAVENOUS
Status: DISCONTINUED | OUTPATIENT
Start: 2022-02-26 | End: 2022-02-28 | Stop reason: HOSPADM

## 2022-02-26 RX ORDER — TORSEMIDE 20 MG/1
10 TABLET ORAL DAILY
Status: DISCONTINUED | OUTPATIENT
Start: 2022-02-27 | End: 2022-02-28 | Stop reason: HOSPADM

## 2022-02-26 RX ORDER — BUDESONIDE AND FORMOTEROL FUMARATE DIHYDRATE 160; 4.5 UG/1; UG/1
2 AEROSOL RESPIRATORY (INHALATION) 2 TIMES DAILY
Status: DISCONTINUED | OUTPATIENT
Start: 2022-02-26 | End: 2022-02-26

## 2022-02-26 RX ORDER — METOPROLOL SUCCINATE 100 MG/1
100 TABLET, EXTENDED RELEASE ORAL DAILY
Status: DISCONTINUED | OUTPATIENT
Start: 2022-02-27 | End: 2022-02-27

## 2022-02-26 RX ORDER — ASPIRIN 81 MG/1
81 TABLET ORAL DAILY
Status: DISCONTINUED | OUTPATIENT
Start: 2022-02-27 | End: 2022-02-28 | Stop reason: HOSPADM

## 2022-02-26 RX ORDER — GUAIFENESIN 100 MG/5ML
324 LIQUID (ML) ORAL
Status: COMPLETED | OUTPATIENT
Start: 2022-02-26 | End: 2022-02-26

## 2022-02-26 RX ORDER — GUAIFENESIN 600 MG/1
1200 TABLET, EXTENDED RELEASE ORAL EVERY 12 HOURS
Status: DISCONTINUED | OUTPATIENT
Start: 2022-02-26 | End: 2022-02-27

## 2022-02-26 RX ORDER — POLYETHYLENE GLYCOL 3350 17 G/17G
17 POWDER, FOR SOLUTION ORAL DAILY
Status: DISCONTINUED | OUTPATIENT
Start: 2022-02-27 | End: 2022-02-27

## 2022-02-26 RX ORDER — ESCITALOPRAM OXALATE 10 MG/1
10 TABLET ORAL DAILY
Status: DISCONTINUED | OUTPATIENT
Start: 2022-02-27 | End: 2022-02-28 | Stop reason: HOSPADM

## 2022-02-26 RX ORDER — NYSTATIN 100000 [USP'U]/ML
500000 SUSPENSION ORAL 4 TIMES DAILY
Status: DISCONTINUED | OUTPATIENT
Start: 2022-02-26 | End: 2022-02-28 | Stop reason: HOSPADM

## 2022-02-26 RX ORDER — BUDESONIDE AND FORMOTEROL FUMARATE DIHYDRATE 160; 4.5 UG/1; UG/1
2 AEROSOL RESPIRATORY (INHALATION)
Status: DISCONTINUED | OUTPATIENT
Start: 2022-02-27 | End: 2022-02-28 | Stop reason: HOSPADM

## 2022-02-26 RX ORDER — LIDOCAINE 4 G/100G
1 PATCH TOPICAL EVERY 24 HOURS
Status: DISCONTINUED | OUTPATIENT
Start: 2022-02-26 | End: 2022-02-28 | Stop reason: HOSPADM

## 2022-02-26 RX ORDER — TRAZODONE HYDROCHLORIDE 50 MG/1
25 TABLET ORAL
Status: DISCONTINUED | OUTPATIENT
Start: 2022-02-26 | End: 2022-02-28 | Stop reason: HOSPADM

## 2022-02-26 RX ORDER — LORAZEPAM 0.5 MG/1
0.5 TABLET ORAL
Status: DISCONTINUED | OUTPATIENT
Start: 2022-02-26 | End: 2022-02-28 | Stop reason: HOSPADM

## 2022-02-26 RX ORDER — PRASUGREL 10 MG/1
10 TABLET, FILM COATED ORAL ONCE
Status: COMPLETED | OUTPATIENT
Start: 2022-02-26 | End: 2022-02-26

## 2022-02-26 RX ORDER — AMOXICILLIN 250 MG
2 CAPSULE ORAL DAILY
Status: DISCONTINUED | OUTPATIENT
Start: 2022-02-27 | End: 2022-02-27

## 2022-02-26 RX ORDER — METHIMAZOLE 10 MG/1
10 TABLET ORAL DAILY
Status: DISCONTINUED | OUTPATIENT
Start: 2022-02-27 | End: 2022-02-28 | Stop reason: HOSPADM

## 2022-02-26 RX ADMIN — GUAIFENESIN 1200 MG: 600 TABLET ORAL at 20:35

## 2022-02-26 RX ADMIN — Medication 10 ML: at 17:05

## 2022-02-26 RX ADMIN — ASPIRIN 324 MG: 81 TABLET, CHEWABLE ORAL at 16:09

## 2022-02-26 RX ADMIN — PRASUGREL 10 MG: 10 TABLET, FILM COATED ORAL at 21:00

## 2022-02-26 RX ADMIN — ATORVASTATIN CALCIUM 80 MG: 80 TABLET, FILM COATED ORAL at 22:50

## 2022-02-26 RX ADMIN — SODIUM CHLORIDE, PRESERVATIVE FREE 10 ML: 5 INJECTION INTRAVENOUS at 22:00

## 2022-02-26 RX ADMIN — SODIUM CHLORIDE 50 ML: 900 INJECTION, SOLUTION INTRAVENOUS at 17:05

## 2022-02-26 RX ADMIN — PANTOPRAZOLE SODIUM 40 MG: 40 TABLET, DELAYED RELEASE ORAL at 20:36

## 2022-02-26 RX ADMIN — OXYCODONE 10 MG: 5 TABLET ORAL at 20:35

## 2022-02-26 RX ADMIN — IOPAMIDOL 100 ML: 755 INJECTION, SOLUTION INTRAVENOUS at 17:04

## 2022-02-26 RX ADMIN — LORAZEPAM 0.5 MG: 0.5 TABLET ORAL at 20:36

## 2022-02-26 NOTE — H&P
Hospitalist Admission History and Physical     NAME:  Hayley Solis   Age:  66 y.o.  :   1950   MRN:   [id number]  PCP: Kayleigh Lim MD  Consulting MD:  Treatment Team: Attending Provider: Debbie Cobb DO; Nurse Practitioner: Ryan Motley NP; Primary Nurse: Toy Toussaint RN    Chief Complaint   Patient presents with    Chest Pain         HPI:   Patient is a 66 y.o. female who presented to the ED for cc chest pain. Hx of CAD s/p PCI, COPD on chronic steroids wearing 1L NC at home, anxiety, GERD, HTN, hyperthyroidism, left mid lung mass--Follows Dr. Patricio Jewell pulmonology. Was just discharged from rehab on  after she had right hip repair on  with post op NSTEMI s/p PCI to LAD and distal right coronary artery on , and COPD exacerbation. ECHO showed no CHF but does have significant mitral regurg. EF 52%. Chest pain centrally located and does not radiate. Nothing seems to make better or worse. Feels palpitations. Admits to increased anxiety. Vitals - , BP elevated. Labs- troponin 118 but was as high as 11,000 earlier this month. CT PE protocol showed 2 ill-defined masslike areas in the left lung, concerning for malignancy.  T12 and L1 compression fractures, likely subacute, and NO PE    EKG with ST depressions at inferior/lateral leads  Past Medical History:   Diagnosis Date    Arrhythmia     Arthritis     CAD (coronary artery disease) , 2013    PCI,  Bowen Brown     Carotid stenosis, right     endarterectomy 19    Chronic anxiety 2017    COPD     recent referral to [address] for lung transplant    Emphysema lung ([facility] [address]     GERD (gastroesophageal reflux disease)     controlled with medication    History of echocardiogram 2019    History of echocardiogram     echo 19 LVEF 55-60%    Hypertension     Nausea & vomiting     Oxygen dependent     1 liter at night    Pleurisy     Thyroid disease     pt denies        Past Surgical History:   Procedure Laterality Date    HX GYN      rebuilt cervix    HX HEART CATHETERIZATION  2019    last stent- per patient- 10 stents total    HX OTHER SURGICAL      HX TONSIL AND ADENOIDECTOMY      VASCULAR SURGERY PROCEDURE UNLIST Right 2019    carotid endarterectomy        Family History   Problem Relation Age of Onset    No Known Problems Mother         adopted       Social History     Social History Narrative    , has 2 children, 1 step child. 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         Social History     Tobacco Use    Smoking status: Former Smoker     Packs/day: 0.75     Years: 53.00     Pack years: 39.75     Types: Cigarettes     Quit date:      Years since quittin.1    Smokeless tobacco: Never Used   Substance Use Topics    Alcohol use: Yes     Comment: rarely        Social History     Substance and Sexual Activity   Drug Use No         Allergies   Allergen Reactions    Promethazine Nausea and Vomiting and Other (comments)     Severe vomiting        Prior to Admission medications    Medication Sig Start Date End Date Taking? Authorizing Provider   nystatin (MYCOSTATIN) 100,000 unit/mL suspension Take 5 mL by mouth four (4) times daily. swish and spit  Indications: thrush 22   REKHA Leos   methIMAzole (TAPAZOLE) 10 mg tablet Take 1 Tablet by mouth daily. PCP will continue to prescribe this medication. Indications: overactive thyroid gland 22   REKHA Leos   prasugreL (EFFIENT) 10 mg tablet Take 1 Tablet by mouth daily. Cardiologist or PCP will continue to prescribe this medication. Indications: blood clot prevention following percutaneous coronary intervention 22   REKHA Leos   guaiFENesin ER (MUCINEX) 1,200 mg Ta12 ER tablet Take 1 Tablet by mouth every twelve (12) hours. Pulmonologist or PCP will continue to prescribe this medication.  22   REKHA Leos   predniSONE (DELTASONE) 10 mg tablet Take 10 mg by mouth daily (with lunch) for 1 dose. Indications: worsening chronic obstructive pulmonary disease 2/25/22 2/26/22  REKHA Lewis   acetaminophen (TYLENOL) 325 mg tablet Take 2 Tablets by mouth every eight (8) hours. Indications: for hip surgery pain 2/24/22   REKHA Lewis   amLODIPine (NORVASC) 5 mg tablet Take 1 Tablet by mouth daily. Cardiologist or PCP will continue to prescribe this medication. 2/25/22   REKHA Lewis   escitalopram oxalate (LEXAPRO) 10 mg tablet Take 1 Tablet by mouth daily. PCP will continue to prescribe this medication. Indications: anxiousness associated with depression 2/25/22   REKHA Lewis   lidocaine 4 % patch Apply patch to affected area for 12 hours a day and remove for 12 hours a day. 2/24/22   REKHA Lewis   LORazepam (ATIVAN) 0.5 mg tablet Take 1 Tablet by mouth every eight (8) hours as needed for Anxiety or Agitation. Max Daily Amount: 1.5 mg. PCP will prescribe this medication if it is necessary to continue. 2/24/22   REKHA Lewis   oxyCODONE IR (ROXICODONE) 5 mg immediate release tablet Take 1-2 Tablets by mouth every four (4) hours as needed (for hip fracture surgery pain) for up to 3 days. Max Daily Amount: 60 mg. Surgeon or PCP will prescribe this medication if it is necessary to continue. 2/24/22 2/27/22  REKHA Lewis   polyethylene glycol (Miralax) 17 gram/dose powder Take 17 g by mouth daily. Indications: constipation 2/24/22   REKHA Lewis   potassium chloride (K-DUR, KLOR-CON M20) 20 mEq tablet Take 1 Tablet by mouth two (2) times a day for 3 days. Indications: low amount of potassium in the blood 2/24/22 2/27/22  REKHA Lewis   senna-docusate (PERICOLACE) 8.6-50 mg per tablet Take 2 Tablets by mouth daily.  Indications: constipation 2/25/22   REKHA Lewis   traMADoL Loman Mettle) 50 mg tablet Take 1 Tablet by mouth every six (6) hours as needed (for femur fracture pain) for up to 5 days. Max Daily Amount: 200 mg. 2/24/22 3/1/22  REKHA Leos   traZODone (DESYREL) 50 mg tablet Take 0.5 Tablets by mouth nightly as needed for Sleep. PCP will prescribe this medication if it is necessary to continue. Indications: insomnia associated with depression 2/24/22   REKHA Leos   nitroglycerin (NITROSTAT) 0.4 mg SL tablet 1 Tablet by SubLINGual route three (3) times daily as needed for Chest Pain. Up to 3 doses. 2/4/22   REKHA De La Rosa   roflumilast (Daliresp) 500 mcg tab tablet Take 1 Tablet by mouth daily. 11/30/21   Ryan Du NP   ezetimibe (ZETIA) 10 mg tablet Take 1 Tablet by mouth daily. 10/7/21   Hernesto Ro MD   budesonide-formoteroL (Symbicort) 160-4.5 mcg/actuation HFAA Take 2 Puffs by inhalation two (2) times a day. 10/7/21   Ryan Du NP   metoprolol succinate (TOPROL-XL) 100 mg tablet Take 1 Tablet by mouth daily. 9/27/21   Hernesto Ro MD   atorvastatin (LIPITOR) 80 mg tablet TAKE 1 TABLET BY MOUTH DAILY 9/27/21   Hernesto Ro MD   albuterol (PROVENTIL HFA, VENTOLIN HFA, PROAIR HFA) 90 mcg/actuation inhaler Take 2 Puffs by inhalation every four (4) hours as needed for Wheezing or Shortness of Breath. 8/1/21   Ryan Du NP   torsemide (DEMADEX) 10 mg tablet Take 1 Tablet by mouth daily. 6/4/21   Craig Naranjo MD   tiotropium (Spiriva with HandiHaler) 18 mcg inhalation capsule Take 1 Cap by inhalation daily. 2/24/21   Ryan Du NP   OTHER Handicap placard 1/25/21   Ryan Du NP   albuterol (PROVENTIL VENTOLIN) 2.5 mg /3 mL (0.083 %) nebu 3 mL by Nebulization route every six (6) hours as needed for Shortness of Breath. ICD-10-J44.9, please bill to Med B 9/8/20   Ryan Plants, NP   multivitamin/folic acid/biotin (HAIR-SKIN-NAILS, MV-FA-BIOTIN, PO) Take  by mouth.     Provider, Historical   pantoprazole (PROTONIX) 40 mg tablet TAKE 1 TABLET BY MOUTH TWICE DAILY 9/26/19   Felipe Kincaid MD OXYGEN-AIR DELIVERY SYSTEMS by Does Not Apply route. 1lpm qhs     Provider, Historical   cetirizine (ZYRTEC) 10 mg tablet Take  by mouth daily as needed. Provider, Historical   multivitamin (ONE A DAY) tablet Take 1 Tablet by mouth daily. Provider, Historical   aspirin delayed-release 81 mg tablet Take  by mouth daily. Provider, Historical           Review of Systems    Constitutional: well nourished male in NAD  Eyes:  no change in visual acuity, no photophobia  Ears, nose, mouth, throat, and face: no  Odynphagia, dysphagia, no thrush or exudate, negative for chronic sinus congestion, recurrent headaches  Respiratory: Chronic SOB  Cardiovascular: central chest pain, palpitations   Gastrointestinal:mild diarrhea today  Genitourinary: no urgency, frequency, or dysuria, no nocturia  Integument/breast: negative for skin rash or skin lesions  Hematologic/lymphatic: negative for known bleeding disorder  Musculoskeletal:difficulty with ambulation due to recent hip fracture  Neurological: negative for lightheadedness, syncope or presyncopal events, no seizure or CVA history  Behavioral/Psych: increased anxiety,   Endocrine: negative for polydyspia, polyuria or intolerance to heat or cold  Allergic/Immunologic: negative for chronic allergic rhinitis, or known connective tissue disorder      Objective:     Visit Vitals  BP (!) 114/54   Pulse 82   Temp 97.8 °F (36.6 °C)   Resp 16   Ht 5' 8\" (1.727 m)   Wt 68 kg (150 lb)   SpO2 98%   BMI 22.81 kg/m²        No intake/output data recorded. No intake/output data recorded.     Data Review:   Recent Results (from the past 24 hour(s))   TROPONIN-HIGH SENSITIVITY    Collection Time: 02/26/22  3:39 PM   Result Value Ref Range    Troponin-High Sensitivity 118.6 (HH) 0 - 14 pg/mL   CBC WITH AUTOMATED DIFF    Collection Time: 02/26/22  3:39 PM   Result Value Ref Range    WBC 8.1 4.3 - 11.1 K/uL    RBC 3.41 (L) 4.05 - 5.2 M/uL    HGB 10.5 (L) 11.7 - 15.4 g/dL    HCT 34.1 (L) 35.8 - 46.3 %    .0 (H) 79.6 - 97.8 FL    MCH 30.8 26.1 - 32.9 PG    MCHC 30.8 (L) 31.4 - 35.0 g/dL    RDW 19.7 (H) 11.9 - 14.6 %    PLATELET 140 749 - 254 K/uL    MPV 9.8 9.4 - 12.3 FL    ABSOLUTE NRBC 0.00 0.0 - 0.2 K/uL    DF AUTOMATED      NEUTROPHILS 82 (H) 43 - 78 %    LYMPHOCYTES 11 (L) 13 - 44 %    MONOCYTES 7 4.0 - 12.0 %    EOSINOPHILS 0 (L) 0.5 - 7.8 %    BASOPHILS 0 0.0 - 2.0 %    IMMATURE GRANULOCYTES 1 0.0 - 5.0 %    ABS. NEUTROPHILS 6.7 1.7 - 8.2 K/UL    ABS. LYMPHOCYTES 0.9 0.5 - 4.6 K/UL    ABS. MONOCYTES 0.5 0.1 - 1.3 K/UL    ABS. EOSINOPHILS 0.0 0.0 - 0.8 K/UL    ABS. BASOPHILS 0.0 0.0 - 0.2 K/UL    ABS. IMM. GRANS. 0.1 0.0 - 0.5 K/UL   METABOLIC PANEL, COMPREHENSIVE    Collection Time: 02/26/22  3:39 PM   Result Value Ref Range    Sodium 135 (L) 136 - 145 mmol/L    Potassium 4.0 3.5 - 5.1 mmol/L    Chloride 100 98 - 107 mmol/L    CO2 28 21 - 32 mmol/L    Anion gap 7 7 - 16 mmol/L    Glucose 129 (H) 65 - 100 mg/dL    BUN 9 8 - 23 MG/DL    Creatinine 0.80 0.6 - 1.0 MG/DL    GFR est AA >60 >60 ml/min/1.73m2    GFR est non-AA >60 >60 ml/min/1.73m2    Calcium 9.2 8.3 - 10.4 MG/DL    Bilirubin, total 1.3 (H) 0.2 - 1.1 MG/DL    ALT (SGPT) 25 12 - 65 U/L    AST (SGOT) 35 15 - 37 U/L    Alk. phosphatase 169 (H) 50 - 136 U/L    Protein, total 7.6 6.3 - 8.2 g/dL    Albumin 3.6 3.2 - 4.6 g/dL    Globulin 4.0 (H) 2.3 - 3.5 g/dL    A-G Ratio 0.9 (L) 1.2 - 3.5     LIPASE    Collection Time: 02/26/22  3:39 PM   Result Value Ref Range    Lipase 67 (L) 73 - 393 U/L   MAGNESIUM    Collection Time: 02/26/22  3:39 PM   Result Value Ref Range    Magnesium 1.8 1.8 - 2.4 mg/dL   TROPONIN-HIGH SENSITIVITY    Collection Time: 02/26/22  5:44 PM   Result Value Ref Range    Troponin-High Sensitivity 118.9 (HH) 0 - 14 pg/mL       Physical Exam:     General:  Alert, cooperative, anxious. Eyes:  Conjunctivae/corneas clear. PERRL   Ears:  Normal TMs and external ear canals both ears.    Nose: Wearing  mask Mouth/Throat: Wearing mask   Neck: no JVD. Back:   deferred   Lungs:   Clear to auscultation bilaterally. Heart:  Sinus tachycardia    Abdomen:   Soft, non-tender. Bowel sounds normal. No masses,  No organomegaly. Extremities: Extremities normal, atraumatic, no cyanosis or edema. Pulses: 2+ and symmetric all extremities. Skin: Right lateral hip incision with good healing   Lymph nodes: Cervical, supraclavicular, and axillary nodes normal.   Neurologic: CNII-XII intact. Limited ROM in bed. Assessment and Plan     Principal Problem:    Chest pain (5/31/2019)    Active Problems:    COPD, very severe ([address] (8/20/2013)      Chronic anxiety (4/27/2017)      Pulmonary mass (2/11/2020)      H/O carotid endarterectomy (9/21/2020)      Other fracture of right femur, initial encounter for closed fracture ([facility] [address] (2/4/2022)      Moderate to severe mitral regurgitation (2/8/2022)      EKG, abnormal (2/15/2022)    Chest pain - I do not think this is cardiac etiology, but due to recent PCIs and ST depressions on EKG, will admit and have cardiology to see. She did miss one dose of Effient. Continue ASA and Effient. Trend troponin, cardiac monitor. CAD s/p recent PCI - ASA/statin/Effient    COPD with known pulmonary mass - Does not seem in acute exacerbation. Symbicort. Mucinex. Prednisone. Spiriva  Daliresp. Follow up with Dr. Sterling Long after discharge. Hyperthyroidsim - Methimazole just started on last admission. HTN- BB    Anxiety - PRN ativan. Recent right hip fracture - PT/OT. PRN oxycodone     tachycardia likely reactive. Control pain and anxiety     Place as obs.  Likely can dc on 2/27    DVT prophylaxis - SCDs    Signed By: Erin Wilson DO   February 26, 2022

## 2022-02-26 NOTE — ED TRIAGE NOTES
Pt arrived via EMS from home c/o cp X2 hours. Pt had 2 stents placed 2 weeks ago when she was in the hospital for a right hip replacement. Today, about 2 hours ago, pt c/o cp and nausea. EMS states that pt's HR was 140bp on arrival and pt was given 500ml NS.  Pt denies cp at time of arrival

## 2022-02-26 NOTE — ED PROVIDER NOTES
HPI   65-year-old female significant for CAD, stent placement 2 weeks ago, COPD, GERD, hypertension, thyroid disease, arrhythmia; presents to the emergency department with complaint of chest pain and shortness of breath, beginning approximately 1 hour prior to ED arrival.  Patient states that she was seated in her wheelchair when she began feeling substernal chest pain and pain beneath both axilla. States that shortness of breath increased from her baseline to accompany the chest pain. Reports that she noted heart palpitations that she described as \"like a freight train driving to my chest.\"  EMS was called, they report patient was tachycardic between 130 and 140, they provided IV fluids. On arrival in the ED, patient heart rate improved though tachycardic at 110. States that chest pain has completely resolved and she is no longer short of breath. Endorses pain at the right posterior ribs at this time, no other pain complaint. Denies dizziness, diaphoresis, nausea or vomiting, syncope or near syncope, injury or activity change in all other complaint. No other therapeutic measures known, knows of nothing to make symptoms worse or better. Patient was discharged from this hospital yesterday, where she had a lengthy admission after fall with right hip fracture. Had surgical repair and cardiac stents placed x2. Patient is conversant at length without increased effort.       Past Medical History:   Diagnosis Date    Arrhythmia     Arthritis     CAD (coronary artery disease) 2009, 8/19/2013    PCI,  Yash Sawyer     Carotid stenosis, right     endarterectomy 11/25/19    Chronic anxiety 4/27/2017    COPD     recent referral to [address] for lung transplant    Emphysema lung ([facility] [address]     GERD (gastroesophageal reflux disease)     controlled with medication    History of echocardiogram 06/07/2019    History of echocardiogram     echo 06/07/19 LVEF 55-60%    Hypertension     Nausea & vomiting     Oxygen dependent 1 liter at night    Pleurisy     Thyroid disease     pt denies       Past Surgical History:   Procedure Laterality Date    HX GYN      rebuilt cervix    HX HEART CATHETERIZATION  2019    last stent- per patient- 10 stents total    HX OTHER SURGICAL      HX TONSIL AND ADENOIDECTOMY      VASCULAR SURGERY PROCEDURE UNLIST Right 2019    carotid endarterectomy         Family History:   Problem Relation Age of Onset    No Known Problems Mother         adopted       Social History     Socioeconomic History    Marital status:      Spouse name: Not on file    Number of children: Not on file    Years of education: Not on file    Highest education level: Not on file   Occupational History    Occupation: [employer]     Employer: SELF EMPLOYED     Comment: home   Tobacco Use    Smoking status: Former Smoker     Packs/day: 0.75     Years: 53.00     Pack years: 39.75     Types: Cigarettes     Quit date:      Years since quittin.1    Smokeless tobacco: Never Used   Substance and Sexual Activity    Alcohol use: Yes     Comment: rarely    Drug use: No    Sexual activity: Not on file   Other Topics Concern     Service Not Asked    Blood Transfusions Not Asked    Caffeine Concern Not Asked    Occupational Exposure Not Asked    Hobby Hazards Not Asked    Sleep Concern Not Asked    Stress Concern Not Asked    Weight Concern Not Asked    Special Diet Not Asked    Back Care Not Asked    Exercise Not Asked    Bike Helmet Not Asked    [facility] Not Asked    Self-Exams Not Asked   Social History Narrative    , has 2 children, 1 step child. Lives alone.   Works as an analyst.      Social Determinants of Health     Financial Resource Strain:     Difficulty of Paying Living Expenses: Not on file   Food Insecurity:     Worried About [address] in the Last Year: Not on file    Vishal of Food in the Last Year: Not on XIOMARA Loyd Needs:     Lack of Transportation (Medical): Not on file    Lack of Transportation (Non-Medical): Not on file   Physical Activity:     Days of Exercise per Week: Not on file    Minutes of Exercise per Session: Not on file   Stress:     Feeling of Stress : Not on file   Social Connections:     Frequency of Communication with Friends and Family: Not on file    Frequency of Social Gatherings with Friends and Family: Not on file    Attends Evangelical Services: Not on file    Active Member of [address] [place] [address] or Organizations: Not on file    Attends Club or Organization Meetings: Not on file    Marital Status: Not on file   Intimate Partner Violence:     Fear of Current or Ex-Partner: Not on file    Emotionally Abused: Not on file    Physically Abused: Not on file    Sexually Abused: Not on file   Housing Stability:     Unable to Pay for Housing in the Last Year: Not on file    Number of [place] in the Last Year: Not on file    Unstable Housing in the Last Year: Not on file         ALLERGIES: Promethazine    Review of Systems  Constitutional: Negative for fever. Negative for appetite change, chills, diaphoresis and unexpected weight change. HENT: Negative     Eyes: Negative   Respiratory: As in HPI  Cardiovascular: As in HPI  Musculoskeletal: As in hpi  skin: Negative     Allergic/Immunologic: Negative  Neurological: Negative                          Vitals:    02/26/22 1532 02/26/22 1533   BP:  (!) 145/61   Pulse:  (!) 106   Resp: 20 20   Temp:  97.8 °F (36.6 °C)   SpO2:  97%   Weight: 68 kg (150 lb)    Height: 5' 8\" (1.727 m)             Physical Exam   Constitutional: Oriented to person, place, and time. Appears well-developed and well-nourished. No distress. HENT:    Head: Normocephalic and atraumatic   Right Ear: External ear normal.    Left Ear: External ear normal.     Nose: Nose normal.   Mouth/Throat: Mouth normal.    Eyes: Conjunctivae are normal.   Neck: Supple. No tracheal deviation. Cardiovascular: Normal rate, intact distal pulses. Brisk capillary refill intact, less than 2 seconds. Regular rhythm present. No pitting edema. Pulmonary/Chest: Lungs are clear & equal bilaterally. No adventitious sounds. No respiratory distress. Anterior chest wall tenderness, no increased pain with respiration. Abdominal: Soft. There is no tenderness, tension, guarding, rebound or rigidity. Normoactive bowel sounds. Musculoskeletal: No obvious deformity, erythema, edema. Neurological: Alert and oriented to person, place, and time. No numbness/tingling. No loss of sensation. Positive PMS ×4. GCS= 15. Skin: Skin is warm and dry. Capillary refill takes less than 2 seconds. No abrasion, no lesion, no petechiae and no rash noted. Not diaphoretic. No cyanosis, erythema, or pallor. Psychiatric: Normal mood and affect. Behavior is normal.    Nursing note and vitals reviewed. MDM   69-year-old female in the ED with chest pain and difficulty breathing. As in HPI. Recent stent placements, orthopedic surgery. Discharged home from hospital yesterday, returning today for abrupt onset of chest pain while at rest that was accompanied by increased dyspnea. Free from chest pain on arrival, the reports some posterior right rib pain. Mildly tachycardic on arrival, improved from elevated rate reported by EMS. EKG was obtained upon arrival, compared with prior and there are significant changes noted today with diffuse T wave depression. Will obtain troponins, labs. Will obtain CT angiogram of the chest with concern for pulmonary embolism. Low clinical suspicion of pneumothorax, dissection. Patient remains without chest pain while in the ED. Does endorse shortness of breath when toileting. There are T wave changes on EKG, interpreted with attending, who compared to prior tracings. Plan for admission, will consult cardiology.   I discussed the patient with on-call Cardiologist, Dr. Maranda Morris, who took report. States he is quite familiar with this patient, having evaluated her for a similar episode while she was admitted hospital earlier this month. States that having had such recent heart cath and stent placement does not feel further intervention will be necessary at this time, states that he attributed her prior and likely current EKG changes to COPD exacerbation. Recommends hospitalist admission and states his service will assist as needed. Initial troponin elevated, though significantly lower than those measurements obtained at recent admission. CT angiogram findings reassuring for no PE or other acute emergent process, left lung lesion noted as in prior imaging. I have consulted the hospitalist, discussed the patient, and they will admit. thank you. Procedures  EKG: Interpreted by myself. Interpreted separately by ED attending in absence of cardiologist.  Sinus rhythm. Diffuse T wave depression.

## 2022-02-26 NOTE — ED NOTES
TRANSFER - OUT REPORT:    Verbal report given to omer Quintana(name) on Emmy Chahal  being transferred to [facility](unit) for routine progression of care       Report consisted of patients Situation, Background, Assessment and   Recommendations(SBAR). Information from the following report(s) SBAR was reviewed with the receiving nurse. Lines:   Peripheral IV 02/26/22 Right Forearm (Active)   Site Assessment Clean, dry, & intact 02/26/22 1538   Phlebitis Assessment 0 02/26/22 1538   Infiltration Assessment 0 02/26/22 1538   Dressing Status Clean, dry, & intact 02/26/22 1538       Peripheral IV 02/26/22 Left Antecubital (Active)   Site Assessment Clean, dry, & intact 02/26/22 1619   Phlebitis Assessment 0 02/26/22 1619   Infiltration Assessment 0 02/26/22 1619   Dressing Status Clean, dry, & intact 02/26/22 1619        Opportunity for questions and clarification was provided.       Patient transported with:   [employer]

## 2022-02-26 NOTE — ACP (ADVANCE CARE PLANNING)
[facility] Hospitalist Service  At the heart of better care     Advance Care Planning   Admit Date:  2022  3:29 PM   Name:  Andre Wood   Age:  66 y.o. Sex:  female  :  1950   MRN:  [id number]   Room:  [place] [address] Camryn Madrid is able to make her own decisions: Yes    Patient / surrogate decision-maker directed:  Code Status: FULL CODE -full aggressive medical and surgical interventions, including intubations, resuscitations, pressors, artificial tube feeding      Patient or surrogate consented to discussion of the current conditions, workup, management plans, prognosis, and understand the risk for further deterioration. Time spent: 17 minutes in direct discussion (face to face and/or over phone).     Signed:  Yary Mccray DO

## 2022-02-26 NOTE — ED NOTES
Pt assisted to bedside commode and c/o cp and more sob. RA sat at 91% and work of breathing increased.  Pt placed on 2L NC for comfort

## 2022-02-27 VITALS
OXYGEN SATURATION: 98 % | RESPIRATION RATE: 18 BRPM | DIASTOLIC BLOOD PRESSURE: 80 MMHG | TEMPERATURE: 98.4 F | HEART RATE: 120 BPM | SYSTOLIC BLOOD PRESSURE: 148 MMHG

## 2022-02-27 LAB
ALBUMIN SERPL-MCNC: 3.1 G/DL (ref 3.2–4.6)
ALBUMIN/GLOB SERPL: 0.9 {RATIO} (ref 1.2–3.5)
ALP SERPL-CCNC: 141 U/L (ref 50–136)
ALT SERPL-CCNC: 23 U/L (ref 12–65)
ANION GAP SERPL CALC-SCNC: 6 MMOL/L (ref 7–16)
AST SERPL-CCNC: 31 U/L (ref 15–37)
ATRIAL RATE: 75 BPM
BILIRUB SERPL-MCNC: 1 MG/DL (ref 0.2–1.1)
BUN SERPL-MCNC: 9 MG/DL (ref 8–23)
CALCIUM SERPL-MCNC: 9.1 MG/DL (ref 8.3–10.4)
CALCULATED P AXIS, ECG09: 83 DEGREES
CALCULATED R AXIS, ECG10: 63 DEGREES
CALCULATED T AXIS, ECG11: -119 DEGREES
CHLORIDE SERPL-SCNC: 103 MMOL/L (ref 98–107)
CO2 SERPL-SCNC: 29 MMOL/L (ref 21–32)
CREAT SERPL-MCNC: 0.5 MG/DL (ref 0.6–1)
DIAGNOSIS, 93000: NORMAL
ERYTHROCYTE [DISTWIDTH] IN BLOOD BY AUTOMATED COUNT: 20 % (ref 11.9–14.6)
GLOBULIN SER CALC-MCNC: 3.4 G/DL (ref 2.3–3.5)
GLUCOSE SERPL-MCNC: 81 MG/DL (ref 65–100)
HCT VFR BLD AUTO: 29.3 % (ref 35.8–46.3)
HGB BLD-MCNC: 9.1 G/DL (ref 11.7–15.4)
MCH RBC QN AUTO: 30.8 PG (ref 26.1–32.9)
MCHC RBC AUTO-ENTMCNC: 31.1 G/DL (ref 31.4–35)
MCV RBC AUTO: 99.3 FL (ref 79.6–97.8)
NRBC # BLD: 0 K/UL (ref 0–0.2)
P-R INTERVAL, ECG05: 130 MS
PLATELET # BLD AUTO: 177 K/UL (ref 150–450)
PMV BLD AUTO: 10 FL (ref 9.4–12.3)
POTASSIUM SERPL-SCNC: 4.1 MMOL/L (ref 3.5–5.1)
PROT SERPL-MCNC: 6.5 G/DL (ref 6.3–8.2)
Q-T INTERVAL, ECG07: 398 MS
QRS DURATION, ECG06: 82 MS
QTC CALCULATION (BEZET), ECG08: 444 MS
RBC # BLD AUTO: 2.95 M/UL (ref 4.05–5.2)
SODIUM SERPL-SCNC: 138 MMOL/L (ref 136–145)
VENTRICULAR RATE, ECG03: 75 BPM
WBC # BLD AUTO: 4.9 K/UL (ref 4.3–11.1)

## 2022-02-27 PROCEDURE — 85027 COMPLETE CBC AUTOMATED: CPT

## 2022-02-27 PROCEDURE — 99213 OFFICE O/P EST LOW 20 MIN: CPT | Performed by: INTERNAL MEDICINE

## 2022-02-27 PROCEDURE — 36415 COLL VENOUS BLD VENIPUNCTURE: CPT

## 2022-02-27 PROCEDURE — 74011636637 HC RX REV CODE- 636/637: Performed by: FAMILY MEDICINE

## 2022-02-27 PROCEDURE — 94760 N-INVAS EAR/PLS OXIMETRY 1: CPT

## 2022-02-27 PROCEDURE — 94640 AIRWAY INHALATION TREATMENT: CPT

## 2022-02-27 PROCEDURE — 77010033678 HC OXYGEN DAILY

## 2022-02-27 PROCEDURE — 93005 ELECTROCARDIOGRAM TRACING: CPT | Performed by: PHYSICIAN ASSISTANT

## 2022-02-27 PROCEDURE — A9270 NON-COVERED ITEM OR SERVICE: HCPCS | Performed by: FAMILY MEDICINE

## 2022-02-27 PROCEDURE — G0378 HOSPITAL OBSERVATION PER HR: HCPCS

## 2022-02-27 PROCEDURE — 3331090001 HH PPS REVENUE CREDIT

## 2022-02-27 PROCEDURE — 97162 PT EVAL MOD COMPLEX 30 MIN: CPT

## 2022-02-27 PROCEDURE — 3331090002 HH PPS REVENUE DEBIT

## 2022-02-27 PROCEDURE — 74011250637 HC RX REV CODE- 250/637: Performed by: INTERNAL MEDICINE

## 2022-02-27 PROCEDURE — 74011250637 HC RX REV CODE- 250/637: Performed by: FAMILY MEDICINE

## 2022-02-27 PROCEDURE — 94664 DEMO&/EVAL PT USE INHALER: CPT

## 2022-02-27 PROCEDURE — 74011250637 HC RX REV CODE- 250/637: Performed by: HOSPITALIST

## 2022-02-27 PROCEDURE — 80053 COMPREHEN METABOLIC PANEL: CPT

## 2022-02-27 PROCEDURE — 74011000250 HC RX REV CODE- 250: Performed by: FAMILY MEDICINE

## 2022-02-27 PROCEDURE — 86580 TB INTRADERMAL TEST: CPT | Performed by: FAMILY MEDICINE

## 2022-02-27 PROCEDURE — 97530 THERAPEUTIC ACTIVITIES: CPT

## 2022-02-27 RX ORDER — AMOXICILLIN 250 MG
2 CAPSULE ORAL
Status: DISCONTINUED | OUTPATIENT
Start: 2022-02-27 | End: 2022-02-28 | Stop reason: HOSPADM

## 2022-02-27 RX ORDER — GUAIFENESIN 600 MG/1
1200 TABLET, EXTENDED RELEASE ORAL
Status: DISCONTINUED | OUTPATIENT
Start: 2022-02-27 | End: 2022-02-28 | Stop reason: HOSPADM

## 2022-02-27 RX ORDER — POLYETHYLENE GLYCOL 3350 17 G/17G
17 POWDER, FOR SOLUTION ORAL DAILY PRN
Status: DISCONTINUED | OUTPATIENT
Start: 2022-02-27 | End: 2022-02-28 | Stop reason: HOSPADM

## 2022-02-27 RX ORDER — METOPROLOL SUCCINATE 25 MG/1
50 TABLET, EXTENDED RELEASE ORAL DAILY
Status: DISCONTINUED | OUTPATIENT
Start: 2022-02-27 | End: 2022-02-28 | Stop reason: HOSPADM

## 2022-02-27 RX ADMIN — SODIUM CHLORIDE, PRESERVATIVE FREE 10 ML: 5 INJECTION INTRAVENOUS at 21:57

## 2022-02-27 RX ADMIN — PREDNISONE 15 MG: 5 TABLET ORAL at 11:55

## 2022-02-27 RX ADMIN — METOPROLOL SUCCINATE 50 MG: 25 TABLET, EXTENDED RELEASE ORAL at 09:03

## 2022-02-27 RX ADMIN — TIOTROPIUM BROMIDE INHALATION SPRAY 2 PUFF: 3.12 SPRAY, METERED RESPIRATORY (INHALATION) at 08:45

## 2022-02-27 RX ADMIN — METHIMAZOLE 10 MG: 10 TABLET ORAL at 09:03

## 2022-02-27 RX ADMIN — PANTOPRAZOLE SODIUM 40 MG: 40 TABLET, DELAYED RELEASE ORAL at 09:03

## 2022-02-27 RX ADMIN — BUDESONIDE AND FORMOTEROL FUMARATE DIHYDRATE 2 PUFF: 160; 4.5 AEROSOL RESPIRATORY (INHALATION) at 08:44

## 2022-02-27 RX ADMIN — OXYCODONE 10 MG: 5 TABLET ORAL at 02:32

## 2022-02-27 RX ADMIN — OXYCODONE 5 MG: 5 TABLET ORAL at 08:44

## 2022-02-27 RX ADMIN — PANTOPRAZOLE SODIUM 40 MG: 40 TABLET, DELAYED RELEASE ORAL at 17:25

## 2022-02-27 RX ADMIN — OXYCODONE 5 MG: 5 TABLET ORAL at 17:25

## 2022-02-27 RX ADMIN — GUAIFENESIN 1200 MG: 600 TABLET ORAL at 13:03

## 2022-02-27 RX ADMIN — BUDESONIDE AND FORMOTEROL FUMARATE DIHYDRATE 2 PUFF: 160; 4.5 AEROSOL RESPIRATORY (INHALATION) at 20:26

## 2022-02-27 RX ADMIN — ATORVASTATIN CALCIUM 80 MG: 80 TABLET, FILM COATED ORAL at 21:53

## 2022-02-27 RX ADMIN — OXYCODONE 10 MG: 5 TABLET ORAL at 21:53

## 2022-02-27 RX ADMIN — SODIUM CHLORIDE, PRESERVATIVE FREE 10 ML: 5 INJECTION INTRAVENOUS at 06:07

## 2022-02-27 RX ADMIN — LORAZEPAM 0.5 MG: 0.5 TABLET ORAL at 21:56

## 2022-02-27 RX ADMIN — PRASUGREL 10 MG: 10 TABLET, FILM COATED ORAL at 09:09

## 2022-02-27 RX ADMIN — ASPIRIN 81 MG: 81 TABLET ORAL at 09:03

## 2022-02-27 RX ADMIN — TORSEMIDE 10 MG: 20 TABLET ORAL at 09:03

## 2022-02-27 RX ADMIN — TUBERCULIN PURIFIED PROTEIN DERIVATIVE 5 UNITS: 5 INJECTION, SOLUTION INTRADERMAL at 09:05

## 2022-02-27 RX ADMIN — ESCITALOPRAM OXALATE 10 MG: 10 TABLET ORAL at 09:03

## 2022-02-27 RX ADMIN — OXYCODONE 5 MG: 5 TABLET ORAL at 13:05

## 2022-02-27 NOTE — PROGRESS NOTES
ACUTE PHYSICAL THERAPY GOALS:  (Developed with and agreed upon by patient and/or caregiver. )  LTG:  (1.)Ms. Hansa Luke will move from supine to sit and sit to supine , scoot up and down and roll side to side in bed with INDEPENDENT within 7 treatment day(s). (2.)Ms. Hansa Luke will transfer from bed to chair and chair to bed with MODIFIED INDEPENDENCE using the least restrictive device within 7 treatment day(s). (3.)Ms. Porter will ambulate with MODIFIED INDEPENDENCE for 250+ feet with the least restrictive device within 7 treatment day(s) while maintaining normal vital signs. (4.)Ms. Hansa Luke will be independent with HEP for R LE there ex within 7 treatment days for increased AROM and strength.   ________________________________________________________________________________________________       PHYSICAL THERAPY ASSESSMENT: Initial Assessment and AM PT Treatment Day # 1      Keith Huitron is a 66 y.o. female   PRIMARY DIAGNOSIS: Chest pain  Chest pain [R07.9]       Reason for Referral:    ICD-10: Treatment Diagnosis: Difficulty in walking, Not elsewhere classified (R26.2)  OBSERVATION: [place] / [place] PART A [place] B / Product Type: Medicare /     ASSESSMENT:     REHAB RECOMMENDATIONS:   Recommendation to date pending progress:  [address] [place] [address]  Equipment:    None     PRIOR LEVEL OF FUNCTION:  (Prior to Hospitalization) INITIAL/CURRENT LEVEL OF FUNCTION:  (Most Recently Demonstrated)   Bed Mobility:   Modified Independent  Sit to Stand:   Modified Independent  Transfers:   Modified Independent  Gait/Mobility:   Modified Independent Bed Mobility:   Modified Independent  Sit to Stand:  [facility] [employer] Assistance  Transfers:   Contact Guard Assistance  Gait/Mobility:   Contact Guard Assistance     ASSESSMENT:  Ms. Hansa Luke presents with decreased transfers, ambulation, balance, activity tolerance and overall general functional mobility s/p hospital admission with CP at home, now resolved per pt. Pt familiar to PT from previous hospital admission, R hip surgery on 2/4/22 WBAT, rehab at [facility], recently discharged home 2/25/22. Pt to ED on 2/26/22 with CP. Pt on RA, states was doing well in rehab, ambulating with RW ~70-80'. Pt today on RA on arrival, stats 93%, HR 77, no pain. Pt MOD I to sit to EOB, SBA transfers. Once standing, pt increased HR to 90s, states dizzy, needs to sit, O2 low 80s. Pt placed on 2 L O2, stats immediately increased to 94% once pt settled. Pt then able to ambulate with RW for 50' on 1 L O2 for comfort, O2 stats >90%. Pt anxious with mobility, requires additional time for activity but overall MOD I to CGA. Pt should be ok to return home with HHPT and family assist at discharge.       SUBJECTIVE:   Ms. Juana Wilson states, \"I know I have a problem with anxiety\"    SOCIAL HISTORY/LIVING ENVIRONMENT: lives with significant other; RW since R hip sx, WBAT; some assist ADLs, mod I toileting, 1 L O2 for comfort  Home Environment: Private residence  # Steps to Enter: 1  One/Two Story Residence: One story  Living Alone: No  Support Systems: Spouse/Significant Other  OBJECTIVE:     PAIN: VITAL SIGNS: LINES/DRAINS:   Pre Treatment: Pain Screen  Pain Scale 1: Numeric (0 - 10)  Pain Intensity 1: 0  Post Treatment: 0 Vital Signs  O2 Sat (%): 94 %  O2 Device: None (Room air) none  O2 Device: None (Room air)     GROSS EVALUATION:  B LE Within Functional Limits Abnormal/ Functional Abnormal/ Non-Functional (see comments) Not Tested Comments:   AROM [] [x] [] [] R LE   PROM [x] [] [] []    Strength [] [x] [] [] R LE s/p hip sx 2/4   Balance [x] [] [] []    Posture [x] [] [] []    Sensation [x] [] [] []    Coordination [x] [] [] []    Tone [x] [] [] []    Edema [] [x] [] []    Activity Tolerance [] [x] [] []     [] [] [] []      COGNITION/  PERCEPTION: Intact Impaired   (see comments) Comments:   Orientation [x] []    Vision [x] []    Hearing [x] []    Command Following [x] []    Safety Awareness [x] []     [] []      MOBILITY: I Mod I S SBA CGA Min Mod Max Total  NT x2 Comments:   Bed Mobility    Rolling [] [] [] [] [] [] [] [] [] [x] []    Supine to Sit [] [x] [] [] [] [] [] [] [] [] []    Scooting [] [x] [] [] [] [] [] [] [] [] []    Sit to Supine [] [] [] [] [] [] [] [] [] [x] [] chair   Transfers    Sit to Stand [] [] [] [x] [] [] [] [] [] [] []    Bed to Chair [] [] [] [] [x] [] [] [] [] [] []    Stand to Sit [] [] [] [x] [] [] [] [] [] [] []    I=Independent, Mod I=Modified Independent, S=Supervision, SBA=Standby Assistance, CGA=Contact Guard Assistance,   Min=Minimal Assistance, Mod=Moderate Assistance, Max=Maximal Assistance, Total=Total Assistance, NT=Not Tested  GAIT: I Mod I S SBA CGA Min Mod Max Total  NT x2 Comments:   Level of Assistance [] [] [] [x] [x] [] [] [] [] [] []    Distance 50    DME Rolling Walker    Gait Quality Slow sushant, cues for breathing    Weightbearing Status WBAT     I=Independent, Mod I=Modified Independent, S=Supervision, SBA=Standby Assistance, CGA=Contact Guard Assistance,   Min=Minimal Assistance, Mod=Moderate Assistance, Max=Maximal Assistance, Total=Total Assistance, NT=Not Tested    [address] [place] [address] [address] AM-PAC 6 Clicks   Basic Mobility Inpatient Short Form       How much difficulty does the patient currently have. .. Unable A Lot A Little None   1. Turning over in bed (including adjusting bedclothes, sheets and blankets)? [] 1   [] 2   [x] 3   [] 4   2. Sitting down on and standing up from a chair with arms ( e.g., wheelchair, bedside commode, etc.)   [] 1   [] 2   [] 3   [x] 4   3. Moving from lying on back to sitting on the side of the bed? [] 1   [] 2   [] 3   [x] 4   How much help from another person does the patient currently need. .. Total A Lot A Little None   4. Moving to and from a bed to a chair (including a wheelchair)? [] 1   [] 2   [x] 3   [] 4   5. Need to walk in hospital room?    [] 1   [] 2   [x] 3   [] 4   6.  Climbing 3-5 steps with a railing? [] 1   [] 2   [x] 3   [] 4   © 2007, Trustees of [address] [place] [address] [address] [address], under license to [employer]. All rights reserved     Score:  Initial: 20 Most Recent: X (Date: -- )    Interpretation of Tool:  Represents activities that are increasingly more difficult (i.e. Bed mobility, Transfers, Gait). PLAN:   FREQUENCY/DURATION: PT Plan of Care: 3 times/week for duration of hospital stay or until stated goals are met, whichever comes first.    PROBLEM LIST:   (Skilled intervention is medically necessary to address:)  1. Decreased ADL/Functional Activities  2. Decreased Activity Tolerance  3. Decreased AROM/PROM  4. Decreased Balance  5. Decreased Gait Ability  6. Decreased Strength  7. Decreased Transfer Abilities   INTERVENTIONS PLANNED:   (Benefits and precautions of physical therapy have been discussed with the patient.)  1. Therapeutic Activity  2. Therapeutic Exercise/HEP  3. Neuromuscular Re-education  4. Gait Training  5. Manual Therapy  6. Education     TREATMENT:     EVALUATION: Moderate Complexity : (Untimed Charge)    TREATMENT:   (     )  Therapeutic Activity (23 Minutes): Therapeutic activity included Supine to Sit, Scooting, Transfer Training, Ambulation on level ground, Sitting balance  and Standing balance to improve functional Mobility, Strength, Activity tolerance and balance.     TREATMENT GRID:  N/A    AFTER TREATMENT POSITION/PRECAUTIONS:  Chair, Needs within reach and RN notified    INTERDISCIPLINARY COLLABORATION:  RN/PCT and PT/PTA    TOTAL TREATMENT DURATION:  PT Patient Time In/Time Out  Time In: 1050  Time Out: [address], PT

## 2022-02-27 NOTE — PROGRESS NOTES
Hospitalist    NAME:  Andre Wood   Age:  66 y.o.  :   1950   MRN:   [id number]  PCP: Jesica Chong MD  Consulting MD:  Treatment Team: Attending Provider: Charlean Olszewski, MD; Hospitalist: Charlean Olszewski, MD; Consulting Provider: Nick Norman MD; Utilization Review: Jareth Solorio RN      HPI:     t73 y.o. female who presented to the ED for cc chest pain. Hx of CAD s/p PCI, COPD on chronic steroids wearing 1L NC at home, anxiety, GERD, HTN, hyperthyroidism, left mid lung mass--Follows Dr. Shaye Martinez pulmonology. Was just discharged from rehab on  after she had right hip repair on  with post op NSTEMI s/p PCI to LAD and distal right coronary artery on , and COPD exacerbation. ECHO showed no CHF but does have significant mitral regurg. EF 52%. Chest pain centrally located and does not radiate. Nothing seems to make better or worse. Feels palpitations. Admits to increased anxiety. Vitals - , BP elevated. Labs- troponin 118 but was as high as 11,000 earlier this month. CT PE protocol showed 2 ill-defined masslike areas in the left lung, concerning for malignancy.  T12 and L1 compression fractures, likely subacute, and NO PE  EKG with ST depressions at inferior/lateral leads    Today, no CP/SOB  Or cough, sit allan chair, anxious    Objective:     Visit Vitals  BP (!) 95/50 (BP 1 Location: Left upper arm, BP Patient Position: Sitting)   Pulse 79   Temp 98.3 °F (36.8 °C)   Resp 18   Ht 5' 8\" (1.727 m)   Wt 68 kg (150 lb)   SpO2 95%   BMI 22.81 kg/m²      Data Review:   Recent Results (from the past 24 hour(s))   TROPONIN-HIGH SENSITIVITY    Collection Time: 22  3:39 PM   Result Value Ref Range    Troponin-High Sensitivity 118.6 (HH) 0 - 14 pg/mL   CBC WITH AUTOMATED DIFF    Collection Time: 22  3:39 PM   Result Value Ref Range    WBC 8.1 4.3 - 11.1 K/uL    RBC 3.41 (L) 4.05 - 5.2 M/uL    HGB 10.5 (L) 11.7 - 15.4 g/dL    HCT 34.1 (L) 35.8 - 46.3 %    .0 (H) 79.6 - 97.8 FL    MCH 30.8 26.1 - 32.9 PG    MCHC 30.8 (L) 31.4 - 35.0 g/dL    RDW 19.7 (H) 11.9 - 14.6 %    PLATELET 599 363 - 569 K/uL    MPV 9.8 9.4 - 12.3 FL    ABSOLUTE NRBC 0.00 0.0 - 0.2 K/uL    DF AUTOMATED      NEUTROPHILS 82 (H) 43 - 78 %    LYMPHOCYTES 11 (L) 13 - 44 %    MONOCYTES 7 4.0 - 12.0 %    EOSINOPHILS 0 (L) 0.5 - 7.8 %    BASOPHILS 0 0.0 - 2.0 %    IMMATURE GRANULOCYTES 1 0.0 - 5.0 %    ABS. NEUTROPHILS 6.7 1.7 - 8.2 K/UL    ABS. LYMPHOCYTES 0.9 0.5 - 4.6 K/UL    ABS. MONOCYTES 0.5 0.1 - 1.3 K/UL    ABS. EOSINOPHILS 0.0 0.0 - 0.8 K/UL    ABS. BASOPHILS 0.0 0.0 - 0.2 K/UL    ABS. IMM. GRANS. 0.1 0.0 - 0.5 K/UL   METABOLIC PANEL, COMPREHENSIVE    Collection Time: 02/26/22  3:39 PM   Result Value Ref Range    Sodium 135 (L) 136 - 145 mmol/L    Potassium 4.0 3.5 - 5.1 mmol/L    Chloride 100 98 - 107 mmol/L    CO2 28 21 - 32 mmol/L    Anion gap 7 7 - 16 mmol/L    Glucose 129 (H) 65 - 100 mg/dL    BUN 9 8 - 23 MG/DL    Creatinine 0.80 0.6 - 1.0 MG/DL    GFR est AA >60 >60 ml/min/1.73m2    GFR est non-AA >60 >60 ml/min/1.73m2    Calcium 9.2 8.3 - 10.4 MG/DL    Bilirubin, total 1.3 (H) 0.2 - 1.1 MG/DL    ALT (SGPT) 25 12 - 65 U/L    AST (SGOT) 35 15 - 37 U/L    Alk.  phosphatase 169 (H) 50 - 136 U/L    Protein, total 7.6 6.3 - 8.2 g/dL    Albumin 3.6 3.2 - 4.6 g/dL    Globulin 4.0 (H) 2.3 - 3.5 g/dL    A-G Ratio 0.9 (L) 1.2 - 3.5     LIPASE    Collection Time: 02/26/22  3:39 PM   Result Value Ref Range    Lipase 67 (L) 73 - 393 U/L   MAGNESIUM    Collection Time: 02/26/22  3:39 PM   Result Value Ref Range    Magnesium 1.8 1.8 - 2.4 mg/dL   TROPONIN-HIGH SENSITIVITY    Collection Time: 02/26/22  5:44 PM   Result Value Ref Range    Troponin-High Sensitivity 118.9 (HH) 0 - 14 pg/mL   TROPONIN-HIGH SENSITIVITY    Collection Time: 02/26/22  8:23 PM   Result Value Ref Range    Troponin-High Sensitivity 105.9 (HH) 0 - 14 pg/mL   METABOLIC PANEL, COMPREHENSIVE    Collection Time: 02/27/22  5:04 AM   Result Value Ref Range    Sodium 138 136 - 145 mmol/L    Potassium 4.1 3.5 - 5.1 mmol/L    Chloride 103 98 - 107 mmol/L    CO2 29 21 - 32 mmol/L    Anion gap 6 (L) 7 - 16 mmol/L    Glucose 81 65 - 100 mg/dL    BUN 9 8 - 23 MG/DL    Creatinine 0.50 (L) 0.6 - 1.0 MG/DL    GFR est AA >60 >60 ml/min/1.73m2    GFR est non-AA >60 >60 ml/min/1.73m2    Calcium 9.1 8.3 - 10.4 MG/DL    Bilirubin, total 1.0 0.2 - 1.1 MG/DL    ALT (SGPT) 23 12 - 65 U/L    AST (SGOT) 31 15 - 37 U/L    Alk.  phosphatase 141 (H) 50 - 136 U/L    Protein, total 6.5 6.3 - 8.2 g/dL    Albumin 3.1 (L) 3.2 - 4.6 g/dL    Globulin 3.4 2.3 - 3.5 g/dL    A-G Ratio 0.9 (L) 1.2 - 3.5     CBC W/O DIFF    Collection Time: 02/27/22  5:04 AM   Result Value Ref Range    WBC 4.9 4.3 - 11.1 K/uL    RBC 2.95 (L) 4.05 - 5.2 M/uL    HGB 9.1 (L) 11.7 - 15.4 g/dL    HCT 29.3 (L) 35.8 - 46.3 %    MCV 99.3 (H) 79.6 - 97.8 FL    MCH 30.8 26.1 - 32.9 PG    MCHC 31.1 (L) 31.4 - 35.0 g/dL    RDW 20.0 (H) 11.9 - 14.6 %    PLATELET 081 744 - 044 K/uL    MPV 10.0 9.4 - 12.3 FL    ABSOLUTE NRBC 0.00 0.0 - 0.2 K/uL   EKG, 12 LEAD, SUBSEQUENT    Collection Time: 02/27/22 10:03 AM   Result Value Ref Range    Ventricular Rate 75 BPM    Atrial Rate 75 BPM    P-R Interval 130 ms    QRS Duration 82 ms    Q-T Interval 398 ms    QTC Calculation (Bezet) 444 ms    Calculated P Axis 83 degrees    Calculated R Axis 63 degrees    Calculated T Axis -119 degrees    Diagnosis       Normal sinus rhythm  ST & T wave abnormality, consider anterolateral ischemia  When compared with ECG of 15-FEB-2022 11:02,  Nonspecific T wave abnormality now evident in Inferior leads  T wave inversion now evident in Anterior leads  Confirmed by JULIA BANKS ([id number]) on 2/27/2022 1:46:11 PM         Physical Exam:     Gen: AAO3, anxious, pale  CV: RRR, S1S2, no murmurs  Lungs: CTA, no wheez  Abd: soft nt nd, +bs  Neuro grossly intact      Assessment and Plan       Chest pain, resolved, a/w chronic anxiety, seen by Cardiology, hx of CAD s/p recent PCI - ASA/statin/Effient      Hx of COPD, very severe ([address] (8/20/2013), stable, use 1L O2 in pm      Hx of carotid endarterectomy (9/21/2020)      Moderate to severe mitral regurgitation (2/8/2022)    Rx:  Continue medical Rx  Telemetry, in NSR  Benzo prn, work well per patient    Dispo: home, likely tomorrow    Signed By: Stefanie Martinez MD   February 27, 2022

## 2022-02-27 NOTE — PROGRESS NOTES
Problem: Falls - Risk of  Goal: *Absence of Falls  Description: Document [place] Fall Risk and appropriate interventions in the flowsheet. Outcome: Progressing Towards Goal  Note: Fall Risk Interventions:  Mobility Interventions: Patient to call before getting OOB         Medication Interventions: Patient to call before getting OOB,Teach patient to arise slowly    Elimination Interventions: Call light in reach,Patient to call for help with toileting needs    History of Falls Interventions:  Investigate reason for fall

## 2022-02-27 NOTE — PROGRESS NOTES
TRANSFER - IN REPORT:    Verbal report received from [place], [facility] (name) on Mariana Benson  being received from ED (unit) for routine progression of care      Report consisted of patients Situation, Background, Assessment and   Recommendations(SBAR). Information from the following report(s) SBAR, Kardex, ED Summary, Intake/Output, MAR and Recent Results was reviewed with the receiving nurse. Opportunity for questions and clarification was provided. Assessment completed upon patients arrival to unit and care assumed. Pt. Skin is warm, dry, and flaky. Pt has scattered bruises to Bilateral Upper Extremities and chest area. Pt has excoriation to L butt cheek Allevyn was placed on sacrum. Heels are intact bilaterally no redness.

## 2022-02-27 NOTE — PROGRESS NOTES
Bedside and Verbal shift change report given to Mingo Escudero RN (oncoming nurse) by self (offgoing nurse). Report included the following information SBAR, Kardex, ED Summary, Intake/Output, MAR and Recent Results.

## 2022-02-27 NOTE — H&P
[facility] Cardiology Initial Cardiac Evaluation      Date of  Admission: 2/26/2022  3:29 PM     Primary Care Physician: Kendra Ryder MD  Primary Cardiologist: Dr Cole Nelson  Referring Physician: Dr Melly Benavidez  Supervising Physician: Dr eBcca Aragon    CC/Reason for evaluation: chest pain with recent PCI 2/7, missed last dose of Effient    HPI:  Lev Palumbo is a 66 y.o. female with PMH of CAD s/p multiple stents, [facility] 2/7/22 with two vessel complex PCI (diagonal dissection had to be covered with two 5 x 22 stent. Leading edge of the stent into the LAD a 3.0 x 12 and POBA to dRCA by Dr. Cole Nelson), moderate to severe MR, HTN, HLD, AAA, bilateral carotid stenosis s/p right carotid endarterectomy 12/2019, severe COPD on 1L O2 at night (smoked 1/2 ppd from age 11 until quit 2019) and former smoker who presented to [facility] ED on 2/26 with complaint of palpitations and chest pain. Patient recently admitted to [facility] for right hip fracture s/p repair. During that admission cardiology was consulted for elevated trop/EKG changes prior to surgery. It was felt likely demand ischemia but her troponin increased to over 11K and was then felt likely NSTEMI. Patient underwent complex PCI by Dr Cole Nelson on Feb 7th. Patient was discharged to rehab on 2/10. Stayed in rehab from 2/10-2/25/2022. On Friday evening after returning home from rehab patient noticed sudden onset of heart racing. Reports heart rate was running in 120's. No aggravating or alleviating factors. On the following day patient was seen by home health nurse who felt heart rate was irregular and tachycardic therefore EMS was called. Patient reports elevated heart rate constant since Fridayevening. Had 3-4 episodes of chest discomfort. Describes chest pain as substernal burning sensation that lasted less than one minute. No aggravating or alleviating factors. Admitted to increased [facility]. EMS reported heart rate running 130-140's. Patient was treated with 500 NS bolus by EMS.  Upon arrival to ED, patient was mildly tachycardic with heart rate of 106. Labs showed WBC 8.1, H/H 10.5/34.1, Ptl 198, Na 135, K 4.0, BUN/Cr 9/0.80, and hs trop 118 -118. CXR showed left mid lung mass. CT chest showed 2 ill defined masslike areas in left lung concerning for malignancy, T12 and L1 compression fractures likely subacute, and no evidence of pulmonary embolus. Patient was admitted to medicine service for chest pain. Cardiology consulted for further evaluation. Patient reports no chest pain since arrival to ED. Patient reports not getting to pharmacy yet to  Effient. Patient missed Effient dose Saturday morning but received it in ED.       Past Medical History:   Diagnosis Date    Arrhythmia     Arthritis     CAD (coronary artery disease) 2009, 8/19/2013    PCI,  [place]     Carotid stenosis, right     endarterectomy 11/25/19    Chronic anxiety 4/27/2017    COPD     recent referral to [address] for lung transplant    Emphysema lung ([facility] [address]     GERD (gastroesophageal reflux disease)     controlled with medication    History of echocardiogram 06/07/2019    History of echocardiogram     echo 06/07/19 LVEF 55-60%    Hypertension     Nausea & vomiting     Oxygen dependent     1 liter at night    Pleurisy     Thyroid disease     pt denies      Past Surgical History:   Procedure Laterality Date    HX GYN      rebuilt cervix    HX HEART CATHETERIZATION  06/06/2019    last stent- per patient- 10 stents total    HX OTHER SURGICAL      HX TONSIL AND ADENOIDECTOMY      VASCULAR SURGERY PROCEDURE UNLIST Right 11/25/2019    carotid endarterectomy       Allergies   Allergen Reactions    Promethazine Nausea and Vomiting and Other (comments)     Severe vomiting       Social History     Socioeconomic History    Marital status:      Spouse name: Not on file    Number of children: Not on file    Years of education: Not on file    Highest education level: Not on file   Occupational History    Occupation: [employer]     Employer: SELF EMPLOYED     Comment: home   Tobacco Use    Smoking status: Former Smoker     Packs/day: 0.75     Years: 53.00     Pack years: 39.75     Types: Cigarettes     Quit date: 2018     Years since quittin.1    Smokeless tobacco: Never Used   Substance and Sexual Activity    Alcohol use: Yes     Comment: rarely    Drug use: No    Sexual activity: Not on file   Other Topics Concern     Service Not Asked    Blood Transfusions Not Asked    Caffeine Concern Not Asked    Occupational Exposure Not Asked    Hobby Hazards Not Asked    Sleep Concern Not Asked    Stress Concern Not Asked    Weight Concern Not Asked    Special Diet Not Asked    Back Care Not Asked    Exercise Not Asked    Bike Helmet Not Asked    [address] Not Asked    Self-Exams Not Asked   Social History Narrative    , has 2 children, 1 step child. 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      Social Determinants of Health     Financial Resource Strain:     Difficulty of Paying Living Expenses: Not on file   Food Insecurity:     Worried About [address] in the Last Year: Not on file    [address] in the Last Year: Not on file   Transportation Needs:     Lack of Transportation (Medical): Not on file    Lack of Transportation (Non-Medical):  Not on file   Physical Activity:     Days of Exercise per Week: Not on file    Minutes of Exercise per Session: Not on file   Stress:     Feeling of Stress : Not on file   Social Connections:     Frequency of Communication with Friends and Family: Not on file    Frequency of Social Gatherings with Friends and Family: Not on file    Attends Muslim Services: Not on file    Active Member of Clubs or Organizations: Not on file    Attends Club or Organization Meetings: Not on file    Marital Status: Not on file   Intimate Partner Violence:     Fear of Current or Ex-Partner: Not on file   [employer] Abused: Not on file    Physically Abused: Not on file    Sexually Abused: Not on file   Housing Stability:     Unable to Pay for Housing in the Last Year: Not on file    Number of Places Lived in the Last Year: Not on file    Unstable Housing in the Last Year: Not on file     Family History   Problem Relation Age of Onset    No Known Problems Mother         adopted        Current Facility-Administered Medications   Medication Dose Route Frequency    guaiFENesin ER (MUCINEX) tablet 1,200 mg  1,200 mg Oral BID PRN    metoprolol succinate (TOPROL-XL) XL tablet 50 mg  50 mg Oral DAILY    polyethylene glycol (MIRALAX) packet 17 g  17 g Oral DAILY PRN    senna-docusate (PERICOLACE) 8.6-50 mg per tablet 2 Tablet  2 Tablet Oral BID PRN    sodium chloride (NS) flush 5-40 mL  5-40 mL IntraVENous Q8H    sodium chloride (NS) flush 5-40 mL  5-40 mL IntraVENous PRN    acetaminophen (TYLENOL) tablet 650 mg  650 mg Oral Q6H PRN    Or    acetaminophen (TYLENOL) suppository 650 mg  650 mg Rectal Q6H PRN    ondansetron (ZOFRAN ODT) tablet 4 mg  4 mg Oral Q8H PRN    Or    ondansetron (ZOFRAN) injection 4 mg  4 mg IntraVENous Q6H PRN    albuterol (PROVENTIL VENTOLIN) nebulizer solution 2.5 mg  2.5 mg Nebulization Q4H PRN    aspirin delayed-release tablet 81 mg  81 mg Oral DAILY    atorvastatin (LIPITOR) tablet 80 mg  80 mg Oral QHS    escitalopram oxalate (LEXAPRO) tablet 10 mg  10 mg Oral DAILY    LORazepam (ATIVAN) tablet 0.5 mg  0.5 mg Oral Q8H PRN    methIMAzole (TAPAZOLE) tablet 10 mg  10 mg Oral DAILY    nystatin (MYCOSTATIN) 100,000 unit/mL oral suspension 500,000 Units  500,000 Units Oral QID    oxyCODONE IR (ROXICODONE) tablet 5-10 mg  5-10 mg Oral Q4H PRN    pantoprazole (PROTONIX) tablet 40 mg  40 mg Oral BID    prasugreL (EFFIENT) tablet 10 mg  10 mg Oral DAILY    predniSONE (DELTASONE) tablet 15 mg  15 mg Oral DAILY WITH LUNCH    roflumilast (DALIRESP) tablet 500 mcg- pt to supply (Patient Supplied)  500 mcg Oral DAILY    tiotropium bromide (SPIRIVA RESPIMAT) 2.5 mcg /actuation  2 Puff Inhalation DAILY    torsemide (DEMADEX) tablet 10 mg  10 mg Oral DAILY    traMADoL (ULTRAM) tablet 50 mg  50 mg Oral Q6H PRN    traZODone (DESYREL) tablet 25 mg  25 mg Oral QHS PRN    tuberculin injection 5 Units  5 Units IntraDERMal ONCE    budesonide-formoteroL (SYMBICORT) 160-4.5 mcg/actuation HFA inhaler 2 Puff  2 Puff Inhalation BID RT    lidocaine 4 % patch 1 Patch  1 Patch TransDERmal Q24H     Review of Symptoms:  General: No weight changes, weakness, fever or chills  Skin: no rashes, lumps, or other skin changes  HEENT: no headache, dizziness, lightheadedness, vision changes, hearing changes, tinnitus, vertigo, sinus pressure/pain, bleeding gums, sore throat, or hoarseness  Neck: no swollen glands, goiter, pain or stiffness  Respiratory: Positive for dyspnea. No cough, sputum, hemoptysis, wheezing  Cardiovascular: + as per HPI  Gastrointestinal: Positive for GERD, diarrhea. No constipation, liver problems, or h/o GI bleed  Urinary: no frequency, urgency , hematuria, burning/pain with urination, recent flank pain, polyuria, nocturia, or difficulty urinating  Peripheral Vascular: no claudication, leg cramps, prior DVTs, swelling of calves, legs, or feet, color change, or swelling with redness or tenderness  Musculoskeletal: no muscle or joint pain/stiffness, joint swelling, erythema of joints, or back pain  Psychiatric: no depression or excessive stress  Neurological: no sensory or motor loss, seizures, syncope, tremors, numbness, no dementia  Hematologic: no anemia, easy bruising or bleeding  Endocrine: Positive for thyroid problems. No heat or cold intolerance, excessive sweating, polyuria, polydipsia, diabetes.        Subjective:     Physical Exam:    Vitals:    02/27/22 0029 02/27/22 0528 02/27/22 0804 02/27/22 0845   BP: (!) 124/55 (!) 117/55 129/71    Pulse: 69 66 78    Resp: 20 16 18    Temp: 98.3 °F (36.8 °C) 98 °F (36.7 °C) 97.9 °F (36.6 °C)    SpO2: 98% 98% 96% 96%   Weight:       Height:         General: Well Developed, Well Nourished, No Acute Distress  HEENT: pupils equal and round, no abnormalities noted  Neck: supple, no JVD, no carotid bruits  Heart: S1S2 with RRR without murmurs or gallops  Lungs: Clear throughout auscultation bilaterally without adventitious sounds  Abd: soft, nontender, nondistended, with good bowel sounds  Ext: warm, no edema, calves supple/nontender, pulses 2+ bilaterally  Skin: warm and dry  Psychiatric: Normal mood and affect  Neurologic: Alert and oriented X 3    Cardiographics    Telemetry: normal sinus rhythm  ECG: ordered   Echocardiogram: 2/6/2022  Left Ventricle Left ventricle size is normal. Normal wall thickness. Normal wall motion. Low normal left ventricular systolic function. EF by 2D [employer]psons Biplane is 52%. Normal diastolic function. Left Atrium Left atrium size is normal.   Right Ventricle Right ventricle size is normal. Normal systolic function. Right Atrium Right atrium size is normal.   Aortic Valve Mildly thickened cusps. No transvalvular regurgitation. No stenosis. Mitral Valve Mildly thickened leaflets. Mildly calcified anterior leaflet. Moderate to severe transvalvular regurgitation with an eccentrically directed jet and may underestimate severity. Transesophageal echo  recommended for further evaluation of potentially severe mitral regurgitation. No stenosis. Tricuspid Valve Valve structure is normal. Mild transvalvular regurgitation. No stenosis. Pulmonic Valve The pulmonic valve was not well visualized. Mild transvalvular regurgitation. No stenosis. Aorta Dilated annulus. IVC/Hepatic Veins IVC diameter is less than or equal to 21 mm and decreases greater than 50% during inspiration; therefore the estimated right atrial pressure is normal (~3 mmHg). Pericardium No pericardial effusion.      Labs:   Recent Results (from the past 24 hour(s)) TROPONIN-HIGH SENSITIVITY    Collection Time: 02/26/22  3:39 PM   Result Value Ref Range    Troponin-High Sensitivity 118.6 (HH) 0 - 14 pg/mL   CBC WITH AUTOMATED DIFF    Collection Time: 02/26/22  3:39 PM   Result Value Ref Range    WBC 8.1 4.3 - 11.1 K/uL    RBC 3.41 (L) 4.05 - 5.2 M/uL    HGB 10.5 (L) 11.7 - 15.4 g/dL    HCT 34.1 (L) 35.8 - 46.3 %    .0 (H) 79.6 - 97.8 FL    MCH 30.8 26.1 - 32.9 PG    MCHC 30.8 (L) 31.4 - 35.0 g/dL    RDW 19.7 (H) 11.9 - 14.6 %    PLATELET 137 889 - 825 K/uL    MPV 9.8 9.4 - 12.3 FL    ABSOLUTE NRBC 0.00 0.0 - 0.2 K/uL    DF AUTOMATED      NEUTROPHILS 82 (H) 43 - 78 %    LYMPHOCYTES 11 (L) 13 - 44 %    MONOCYTES 7 4.0 - 12.0 %    EOSINOPHILS 0 (L) 0.5 - 7.8 %    BASOPHILS 0 0.0 - 2.0 %    IMMATURE GRANULOCYTES 1 0.0 - 5.0 %    ABS. NEUTROPHILS 6.7 1.7 - 8.2 K/UL    ABS. LYMPHOCYTES 0.9 0.5 - 4.6 K/UL    ABS. MONOCYTES 0.5 0.1 - 1.3 K/UL    ABS. EOSINOPHILS 0.0 0.0 - 0.8 K/UL    ABS. BASOPHILS 0.0 0.0 - 0.2 K/UL    ABS. IMM. GRANS. 0.1 0.0 - 0.5 K/UL   METABOLIC PANEL, COMPREHENSIVE    Collection Time: 02/26/22  3:39 PM   Result Value Ref Range    Sodium 135 (L) 136 - 145 mmol/L    Potassium 4.0 3.5 - 5.1 mmol/L    Chloride 100 98 - 107 mmol/L    CO2 28 21 - 32 mmol/L    Anion gap 7 7 - 16 mmol/L    Glucose 129 (H) 65 - 100 mg/dL    BUN 9 8 - 23 MG/DL    Creatinine 0.80 0.6 - 1.0 MG/DL    GFR est AA >60 >60 ml/min/1.73m2    GFR est non-AA >60 >60 ml/min/1.73m2    Calcium 9.2 8.3 - 10.4 MG/DL    Bilirubin, total 1.3 (H) 0.2 - 1.1 MG/DL    ALT (SGPT) 25 12 - 65 U/L    AST (SGOT) 35 15 - 37 U/L    Alk.  phosphatase 169 (H) 50 - 136 U/L    Protein, total 7.6 6.3 - 8.2 g/dL    Albumin 3.6 3.2 - 4.6 g/dL    Globulin 4.0 (H) 2.3 - 3.5 g/dL    A-G Ratio 0.9 (L) 1.2 - 3.5     LIPASE    Collection Time: 02/26/22  3:39 PM   Result Value Ref Range    Lipase 67 (L) 73 - 393 U/L   MAGNESIUM    Collection Time: 02/26/22  3:39 PM   Result Value Ref Range    Magnesium 1.8 1.8 - 2.4 mg/dL TROPONIN-HIGH SENSITIVITY    Collection Time: 02/26/22  5:44 PM   Result Value Ref Range    Troponin-High Sensitivity 118.9 (HH) 0 - 14 pg/mL   TROPONIN-HIGH SENSITIVITY    Collection Time: 02/26/22  8:23 PM   Result Value Ref Range    Troponin-High Sensitivity 105.9 (HH) 0 - 14 pg/mL   METABOLIC PANEL, COMPREHENSIVE    Collection Time: 02/27/22  5:04 AM   Result Value Ref Range    Sodium 138 136 - 145 mmol/L    Potassium 4.1 3.5 - 5.1 mmol/L    Chloride 103 98 - 107 mmol/L    CO2 29 21 - 32 mmol/L    Anion gap 6 (L) 7 - 16 mmol/L    Glucose 81 65 - 100 mg/dL    BUN 9 8 - 23 MG/DL    Creatinine 0.50 (L) 0.6 - 1.0 MG/DL    GFR est AA >60 >60 ml/min/1.73m2    GFR est non-AA >60 >60 ml/min/1.73m2    Calcium 9.1 8.3 - 10.4 MG/DL    Bilirubin, total 1.0 0.2 - 1.1 MG/DL    ALT (SGPT) 23 12 - 65 U/L    AST (SGOT) 31 15 - 37 U/L    Alk.  phosphatase 141 (H) 50 - 136 U/L    Protein, total 6.5 6.3 - 8.2 g/dL    Albumin 3.1 (L) 3.2 - 4.6 g/dL    Globulin 3.4 2.3 - 3.5 g/dL    A-G Ratio 0.9 (L) 1.2 - 3.5     CBC W/O DIFF    Collection Time: 02/27/22  5:04 AM   Result Value Ref Range    WBC 4.9 4.3 - 11.1 K/uL    RBC 2.95 (L) 4.05 - 5.2 M/uL    HGB 9.1 (L) 11.7 - 15.4 g/dL    HCT 29.3 (L) 35.8 - 46.3 %    MCV 99.3 (H) 79.6 - 97.8 FL    MCH 30.8 26.1 - 32.9 PG    MCHC 31.1 (L) 31.4 - 35.0 g/dL    RDW 20.0 (H) 11.9 - 14.6 %    PLATELET 110 913 - 473 K/uL    MPV 10.0 9.4 - 12.3 FL    ABSOLUTE NRBC 0.00 0.0 - 0.2 K/uL   EKG, 12 LEAD, SUBSEQUENT    Collection Time: 02/27/22 10:03 AM   Result Value Ref Range    Ventricular Rate 75 BPM    Atrial Rate 75 BPM    P-R Interval 130 ms    QRS Duration 82 ms    Q-T Interval 398 ms    QTC Calculation (Bezet) 444 ms    Calculated P Axis 83 degrees    Calculated R Axis 63 degrees    Calculated T Axis -119 degrees    Diagnosis       Normal sinus rhythm  ST & T wave abnormality, consider anterolateral ischemia  Abnormal ECG  When compared with ECG of 15-FEB-2022 11:02,  Nonspecific T wave abnormality now evident in Inferior leads  T wave inversion now evident in Anterior leads       Pt has been seen and examined by Dr. Becca Aragon. He agrees with the following assessment and plan. Assessment/Plan:      Principal Problem:    Chest pain (5/31/2019)  - chest pain free since admission   - hs trop (118 - 118 -105) flat, was 01630 on 2/6  - continue current medication   - monitor on telemetry for arrhythmia     Active Problems:    CAD with hx multiple PCI  - complex PCI 2/7 to LAD diagonal and dRCA  - continue ASA, Effient, BB, atorvastatin      COPD, very severe ([address] (8/20/2013)  - on 1L  - per primary       Chronic anxiety (4/27/2017)  - per primary       Pulmonary mass (2/11/2020)  - followed outpatient by pulmonary   - per primary       H/O carotid endarterectomy (9/21/2020)  - per primary       Moderate to severe mitral regurgitation (2/8/2022)  - found on prior admission ECHO. Transesophageal ECHO recommended for further evaluation. Not done prior admission due to respiratory status. Thank you for requesting cardiac evaluation and allowing us to participate in the care of this patient. We will continue to follow along with you. Kim Sanz PA-C  Supervising Physician: Dr Becca Aragon     Attending Addendum    Patient independently seen and examined by me. Agree with above note by physician extender with the following additions and exceptions:  66 y.o. female with PMH of CAD s/p multiple stents, [facility] 2/7/22 with two vessel complex PCI, moderate to severe MR, HTN, HLD, AAA, bilateral carotid stenosis s/p right carotid endarterectomy 12/2019, severe COPD on 1L O2 at night admitted with chest pain and complaints of tachycardia.     Key findings are:  No CP or SPAULDING  CV- RRR without murmur  Lungs- decreased BS diffusely  Ext- no edema    Plan: chest pain not consistent with ACS, currently pain free       --cont telemetry to evaluate for arrhythmia       --hypoxic with PT today, may need to consider more home O2       --cont meds, further plan as noted above and pending clinical course    [place] HOOK.  [address] [facility]

## 2022-02-28 ENCOUNTER — HOME CARE VISIT (OUTPATIENT)
Dept: HOME HEALTH SERVICES | Facility: HOME HEALTH | Age: 72
End: 2022-02-28
Payer: MEDICARE

## 2022-02-28 VITALS
RESPIRATION RATE: 18 BRPM | HEIGHT: 68 IN | BODY MASS INDEX: 22.73 KG/M2 | TEMPERATURE: 97.9 F | SYSTOLIC BLOOD PRESSURE: 132 MMHG | WEIGHT: 150 LBS | DIASTOLIC BLOOD PRESSURE: 47 MMHG | HEART RATE: 83 BPM | OXYGEN SATURATION: 97 %

## 2022-02-28 LAB
ANION GAP SERPL CALC-SCNC: 9 MMOL/L (ref 7–16)
BASOPHILS # BLD: 0 K/UL (ref 0–0.2)
BASOPHILS NFR BLD: 0 % (ref 0–2)
BUN SERPL-MCNC: 16 MG/DL (ref 8–23)
CALCIUM SERPL-MCNC: 8.8 MG/DL (ref 8.3–10.4)
CHLORIDE SERPL-SCNC: 100 MMOL/L (ref 98–107)
CO2 SERPL-SCNC: 27 MMOL/L (ref 21–32)
CREAT SERPL-MCNC: 0.6 MG/DL (ref 0.6–1)
DIFFERENTIAL METHOD BLD: ABNORMAL
EOSINOPHIL # BLD: 0.1 K/UL (ref 0–0.8)
EOSINOPHIL NFR BLD: 1 % (ref 0.5–7.8)
ERYTHROCYTE [DISTWIDTH] IN BLOOD BY AUTOMATED COUNT: 19.5 % (ref 11.9–14.6)
GLUCOSE SERPL-MCNC: 85 MG/DL (ref 65–100)
HCT VFR BLD AUTO: 29.9 % (ref 35.8–46.3)
HGB BLD-MCNC: 9.7 G/DL (ref 11.7–15.4)
IMM GRANULOCYTES # BLD AUTO: 0.1 K/UL (ref 0–0.5)
IMM GRANULOCYTES NFR BLD AUTO: 1 % (ref 0–5)
LYMPHOCYTES # BLD: 1.1 K/UL (ref 0.5–4.6)
LYMPHOCYTES NFR BLD: 19 % (ref 13–44)
MCH RBC QN AUTO: 31.2 PG (ref 26.1–32.9)
MCHC RBC AUTO-ENTMCNC: 32.4 G/DL (ref 31.4–35)
MCV RBC AUTO: 96.1 FL (ref 79.6–97.8)
MM INDURATION POC: NORMAL (ref 0–5)
MONOCYTES # BLD: 0.7 K/UL (ref 0.1–1.3)
MONOCYTES NFR BLD: 12 % (ref 4–12)
NEUTS SEG # BLD: 3.8 K/UL (ref 1.7–8.2)
NEUTS SEG NFR BLD: 67 % (ref 43–78)
NRBC # BLD: 0 K/UL (ref 0–0.2)
PLATELET # BLD AUTO: 215 K/UL (ref 150–450)
PMV BLD AUTO: 9.5 FL (ref 9.4–12.3)
POTASSIUM SERPL-SCNC: 3.4 MMOL/L (ref 3.5–5.1)
PPD POC: NORMAL
RBC # BLD AUTO: 3.11 M/UL (ref 4.05–5.2)
SODIUM SERPL-SCNC: 136 MMOL/L (ref 136–145)
WBC # BLD AUTO: 5.7 K/UL (ref 4.3–11.1)

## 2022-02-28 PROCEDURE — 99231 SBSQ HOSP IP/OBS SF/LOW 25: CPT | Performed by: INTERNAL MEDICINE

## 2022-02-28 PROCEDURE — 77010033678 HC OXYGEN DAILY

## 2022-02-28 PROCEDURE — 97530 THERAPEUTIC ACTIVITIES: CPT

## 2022-02-28 PROCEDURE — 94640 AIRWAY INHALATION TREATMENT: CPT

## 2022-02-28 PROCEDURE — 74011250637 HC RX REV CODE- 250/637: Performed by: HOSPITALIST

## 2022-02-28 PROCEDURE — 97112 NEUROMUSCULAR REEDUCATION: CPT

## 2022-02-28 PROCEDURE — 74011250637 HC RX REV CODE- 250/637: Performed by: INTERNAL MEDICINE

## 2022-02-28 PROCEDURE — 85025 COMPLETE CBC W/AUTO DIFF WBC: CPT

## 2022-02-28 PROCEDURE — 3331090002 HH PPS REVENUE DEBIT

## 2022-02-28 PROCEDURE — 3331090001 HH PPS REVENUE CREDIT

## 2022-02-28 PROCEDURE — 94760 N-INVAS EAR/PLS OXIMETRY 1: CPT

## 2022-02-28 PROCEDURE — 94664 DEMO&/EVAL PT USE INHALER: CPT

## 2022-02-28 PROCEDURE — 97165 OT EVAL LOW COMPLEX 30 MIN: CPT

## 2022-02-28 PROCEDURE — G0378 HOSPITAL OBSERVATION PER HR: HCPCS

## 2022-02-28 PROCEDURE — 36415 COLL VENOUS BLD VENIPUNCTURE: CPT

## 2022-02-28 PROCEDURE — 74011000250 HC RX REV CODE- 250: Performed by: FAMILY MEDICINE

## 2022-02-28 PROCEDURE — 74011250637 HC RX REV CODE- 250/637: Performed by: FAMILY MEDICINE

## 2022-02-28 PROCEDURE — 80048 BASIC METABOLIC PNL TOTAL CA: CPT

## 2022-02-28 RX ORDER — LORAZEPAM 0.5 MG/1
0.5 TABLET ORAL
Qty: 20 TABLET | Refills: 0 | Status: ON HOLD | OUTPATIENT
Start: 2022-02-28 | End: 2022-03-30 | Stop reason: SDUPTHER

## 2022-02-28 RX ORDER — POTASSIUM CHLORIDE 20 MEQ/1
40 TABLET, EXTENDED RELEASE ORAL DAILY
Status: DISCONTINUED | OUTPATIENT
Start: 2022-02-28 | End: 2022-02-28 | Stop reason: HOSPADM

## 2022-02-28 RX ORDER — LANOLIN ALCOHOL/MO/W.PET/CERES
400 CREAM (GRAM) TOPICAL DAILY
Qty: 20 TABLET | Refills: 0 | Status: SHIPPED | OUTPATIENT
Start: 2022-03-01 | End: 2022-03-31

## 2022-02-28 RX ORDER — CLOPIDOGREL BISULFATE 75 MG/1
75 TABLET ORAL DAILY
Qty: 90 TABLET | Refills: 0 | Status: SHIPPED | OUTPATIENT
Start: 2022-02-28 | End: 2022-03-15 | Stop reason: SINTOL

## 2022-02-28 RX ORDER — METOPROLOL SUCCINATE 100 MG/1
100 TABLET, EXTENDED RELEASE ORAL DAILY
Qty: 90 TABLET | Refills: 0 | Status: ON HOLD | OUTPATIENT
Start: 2022-02-28 | End: 2022-03-30 | Stop reason: SDUPTHER

## 2022-02-28 RX ORDER — LANOLIN ALCOHOL/MO/W.PET/CERES
400 CREAM (GRAM) TOPICAL DAILY
Status: DISCONTINUED | OUTPATIENT
Start: 2022-02-28 | End: 2022-02-28 | Stop reason: HOSPADM

## 2022-02-28 RX ADMIN — SODIUM CHLORIDE, PRESERVATIVE FREE 10 ML: 5 INJECTION INTRAVENOUS at 06:08

## 2022-02-28 RX ADMIN — METOPROLOL SUCCINATE 50 MG: 25 TABLET, EXTENDED RELEASE ORAL at 08:18

## 2022-02-28 RX ADMIN — BUDESONIDE AND FORMOTEROL FUMARATE DIHYDRATE 2 PUFF: 160; 4.5 AEROSOL RESPIRATORY (INHALATION) at 07:15

## 2022-02-28 RX ADMIN — TIOTROPIUM BROMIDE INHALATION SPRAY 2 PUFF: 3.12 SPRAY, METERED RESPIRATORY (INHALATION) at 07:15

## 2022-02-28 RX ADMIN — METHIMAZOLE 10 MG: 10 TABLET ORAL at 08:19

## 2022-02-28 RX ADMIN — POTASSIUM CHLORIDE 40 MEQ: 20 TABLET, EXTENDED RELEASE ORAL at 09:01

## 2022-02-28 RX ADMIN — ALBUTEROL SULFATE 2.5 MG: 2.5 SOLUTION RESPIRATORY (INHALATION) at 04:25

## 2022-02-28 RX ADMIN — ASPIRIN 81 MG: 81 TABLET ORAL at 08:19

## 2022-02-28 RX ADMIN — Medication 400 MG: at 09:01

## 2022-02-28 RX ADMIN — TORSEMIDE 10 MG: 20 TABLET ORAL at 08:21

## 2022-02-28 RX ADMIN — OXYCODONE 10 MG: 5 TABLET ORAL at 04:11

## 2022-02-28 RX ADMIN — PRASUGREL 10 MG: 10 TABLET, FILM COATED ORAL at 09:00

## 2022-02-28 RX ADMIN — IVABRADINE 5 MG: 5 TABLET, FILM COATED ORAL at 08:19

## 2022-02-28 RX ADMIN — PANTOPRAZOLE SODIUM 40 MG: 40 TABLET, DELAYED RELEASE ORAL at 08:18

## 2022-02-28 RX ADMIN — ESCITALOPRAM OXALATE 10 MG: 10 TABLET ORAL at 08:19

## 2022-02-28 RX ADMIN — OXYCODONE 10 MG: 5 TABLET ORAL at 09:01

## 2022-02-28 NOTE — ROUTINE PROCESS
Walking oxygen assessment performed. Pt started at 97% sitting in the chair on 1L. Pt walked from the chair to the door way and by the time we got to the door her oxygen was 89% with the oxygen. She felt dizzy and too tired to go on. We walked back to the bad and she sat there for several minutes. Her O2 went back up to 98%. I then had her take her oxygen off and stay sitting. Her oxygen began dropping and was 89%, after standing it dropped down to 85%. She sat back down and that concluded the assessment.

## 2022-02-28 NOTE — DISCHARGE SUMMARY
Physician Discharge Summary     Patient ID:  Zoey Samuels  [id number]  77 y.o.  1950    Admit date: 2/26/2022    Discharge date: 2-    Diagnosis:  1- Chest pain, resolved, associated with chronic anxiety, seen by Cardiology, hx of CAD s/p recent PCI - ASA/statin/Effient. Added higher dose of BBL and Ativan low dose as needed. 2- Hx of COPD, severe, stable, steroid dependent, use 1L O2 in pm  3- Hx of carotid endarterectomy, hypertension, moderate to severe mitral regurgitation. Stable. 4- T12-L1 compression fractures, no history of recent injuries, pain controlled. CT Chest:  - LUNGS: There is a stable 2.8 cm spiculated mass in the left perihilar lower  lobe, likely primary lung cancer. There is also an ill-defined density in the  posterior left lower lobe which is stable compared to the most recent CT,  slightly more prominent than on 10/08/2021. It could also represent malignancy. There is stable scarring in the lung apices. No new lung masses or  infiltrates. - HEART/VESSELS: Normal enhancement of pulmonary arteries and aorta. There is  extensive vascular calcification. Heart size is normal.  - MEDIASTINUM/AXILLA: No significant adenopathy.  - CHEST WALL/BONES: There are T12 and L1 compression fractures which are present  in the most recent study, but are new compared with 10/08/2021. There is also  an old T7 compression fracture. - UPPER ABDOMEN: No acute findings. IMPRESSION  1.  2 ill-defined masslike areas in the left lung, concerning for malignancy. 2.  T12 and L1 compression fractures, likely subacute. 3.  No evidence of pulmonary embolus. Hospital course:   66 y.o. female who presented to the ED for cc chest pain. Hx of CAD s/p PCI, COPD on chronic steroids wearing 1L NC at home, anxiety, GERD, HTN, hyperthyroidism, left mid lung mass--Follows Dr. Dafne Nunn pulmonology.  Was just discharged from rehab on 2/25 after she had right hip repair on 2/4 with post op NSTEMI s/p PCI to LAD and distal right coronary artery on 2/7, and COPD exacerbation. ECHO showed no CHF but does have significant mitral regurg. EF 52%. Chest pain centrally located and does not radiate. Nothing seems to make better or worse. Feels palpitations. Admits to increased anxiety. Vitals - , BP elevated. Labs- troponin 118 but was as high as 11,000 earlier this month. CT PE protocol showed 2 ill-defined masslike areas in the left lung, concerning for malignancy. T12 and L1 compression fractures, likely subacute, and NO PE  EKG with ST depressions at inferior/lateral leads  Patient admitted and treated as above. On day of discharge, patient exam showed no chest pain, sob or cough. Back pain well controlled. PCP: Amy Acosta MD    Patient Instructions:   Current Discharge Medication List      START taking these medications    Details   magnesium oxide (MAG-OX) 400 mg tablet Take 1 Tablet by mouth daily for 20 doses. Qty: 20 Tablet, Refills: 0         clopidogreL (Plavix) 75 mg tab Take 1 Tablet by mouth daily. Qty: 90 Tablet, Refills: 0         CONTINUE these medications which have CHANGED    Details   LORazepam (ATIVAN) 0.5 mg tablet Take 1 Tablet by mouth every eight (8) hours as needed for Anxiety or Agitation. Max Daily Amount: 1.5 mg. PCP will prescribe this medication if it is necessary to continue. Qty: 20 Tablet, Refills: 0    Associated Diagnoses: Chronic anxiety         CONTINUE these medications which have NOT CHANGED    Details   methIMAzole (TAPAZOLE) 10 mg tablet Take 1 Tablet by mouth daily. PCP will continue to prescribe this medication. Indications: overactive thyroid gland  Qty: 30 Tablet, Refills: 0      guaiFENesin ER (MUCINEX) 1,200 mg Ta12 ER tablet Take 1 Tablet by mouth every twelve (12) hours. Pulmonologist or PCP will continue to prescribe this medication.   Qty: 60 Tablet, Refills: 0      !! acetaminophen (TYLENOL) 325 mg tablet Take 2 Tablets by mouth every eight (8) hours. Indications: for hip surgery pain  Qty: 40 Tablet, Refills: prn      escitalopram oxalate (LEXAPRO) 10 mg tablet Take 1 Tablet by mouth daily. PCP will continue to prescribe this medication. Indications: anxiousness associated with depression  Qty: 30 Tablet, Refills: 1      polyethylene glycol (Miralax) 17 gram/dose powder Take 17 g by mouth daily. Indications: constipation  Qty: 289 g, Refills: prn      senna-docusate (PERICOLACE) 8.6-50 mg per tablet Take 2 Tablets by mouth daily. Indications: constipation  Qty: 60 Tablet, Refills: prn      nitroglycerin (NITROSTAT) 0.4 mg SL tablet 1 Tablet by SubLINGual route three (3) times daily as needed for Chest Pain. Up to 3 doses. Qty: 60 Each, Refills: 11      ezetimibe (ZETIA) 10 mg tablet Take 1 Tablet by mouth daily. Qty: 90 Tablet, Refills: 3    Associated Diagnoses: Ischemic heart disease      budesonide-formoteroL (Symbicort) 160-4.5 mcg/actuation HFAA Take 2 Puffs by inhalation two (2) times a day. Qty: 3 Each, Refills: 3      atorvastatin (LIPITOR) 80 mg tablet TAKE 1 TABLET BY MOUTH DAILY  Qty: 90 Tablet, Refills: 3    Associated Diagnoses: Ischemic heart disease      pantoprazole (PROTONIX) 40 mg tablet TAKE 1 TABLET BY MOUTH TWICE DAILY  Qty: 180 Tab, Refills: 0      cetirizine (ZYRTEC) 10 mg tablet Take 10 mg by mouth daily as needed for Allergies. aspirin delayed-release 81 mg tablet Take  by mouth daily. !! acetaminophen (TYLENOL) 500 mg tablet Take 1,000 mg by mouth every eight (8) hours as needed for Pain. predniSONE (DELTASONE) 5 mg tablet Take 15 mg by mouth daily. nystatin (MYCOSTATIN) 100,000 unit/mL suspension Take 5 mL by mouth four (4) times daily. swish and spit  Indications: thrush  Qty: 473 mL, Refills: 0      lidocaine 4 % patch Apply patch to affected area for 12 hours a day and remove for 12 hours a day.   Qty: 30 Patch, Refills: 0      traZODone (DESYREL) 50 mg tablet Take 0.5 Tablets by mouth nightly as needed for Sleep. PCP will prescribe this medication if it is necessary to continue. Indications: insomnia associated with depression  Qty: 15 Tablet, Refills: 0      roflumilast (Daliresp) 500 mcg tab tablet Take 1 Tablet by mouth daily. Qty: 90 Tablet, Refills: 3      tiotropium (Spiriva with HandiHaler) 18 mcg inhalation capsule Take 1 Cap by inhalation daily. Qty: 90 Cap, Refills: 3      OTHER Handicap placard  Qty: 1 Each, Refills: 0      albuterol (PROVENTIL VENTOLIN) 2.5 mg /3 mL (0.083 %) nebu 3 mL by Nebulization route every six (6) hours as needed for Shortness of Breath. ICD-10-J44.9, please bill to Med B  Qty: 360 mL, Refills: 11      OXYGEN-AIR DELIVERY SYSTEMS 1 L/min by Nasal route continuous. multivitamin (ONE A DAY) tablet Take 1 Tablet by mouth daily. !! - Potential duplicate medications found. Please discuss with provider. STOP taking these medications       amLODIPine (NORVASC) 5 mg tablet Comments:   Reason for Stopping:         oxyCODONE IR (ROXICODONE) 5 mg immediate release tablet Comments:   Reason for Stopping:         traMADoL (ULTRAM) 50 mg tablet Comments:   Reason for Stopping:         metoprolol succinate (TOPROL-XL) 100 mg tablet Comments:   Reason for Stopping:         albuterol (PROVENTIL HFA, VENTOLIN HFA, PROAIR HFA) 90 mcg/actuation inhaler Comments:   Reason for Stopping:         torsemide (DEMADEX) 10 mg tablet Comments:   Reason for Stopping:         prasugreL (EFFIENT) 10 mg tablet Comments:   Reason for Stopping:         potassium chloride (K-DUR, KLOR-CON M20) 20 mEq tablet Comments:   Reason for Stopping:         multivitamin/folic acid/biotin (HAIR-SKIN-NAILS, MV-FA-BIOTIN, PO) Comments:   Reason for Stopping:               Instructions:     Cardiology as set up, PCP in 1-2 weeks, cardiac diet, activity as tolerated    Time 35 min  Please send copy to primary physician.     Signed:  Adonis Favre, MD  2/28/2022  10:44 AM

## 2022-02-28 NOTE — PROGRESS NOTES
Bedside and Verbal shift change report given to self (oncoming nurse) by Lesvia Saha RN (offgoing nurse). Report included the following information SBAR, Kardex, Intake/Output, MAR and Recent Results.

## 2022-02-28 NOTE — PROGRESS NOTES
Spiritual Care visit, Follow up visit. Patient was happy--said that she was to be discharged soon.     Visit by Masoud Navarro M.Ed., Th.B. ,Staff

## 2022-02-28 NOTE — PROGRESS NOTES
Problem: Falls - Risk of  Goal: *Absence of Falls  Description: Document Aniyah Hernandez Fall Risk and appropriate interventions in the flowsheet. Outcome: Progressing Towards Goal  Note: Fall Risk Interventions:  Mobility Interventions: Communicate number of staff needed for ambulation/transfer,Patient to call before getting OOB         Medication Interventions: Teach patient to arise slowly,Patient to call before getting OOB    Elimination Interventions: Call light in reach,Patient to call for help with toileting needs    History of Falls Interventions:  Investigate reason for fall         Problem: Patient Education: Go to Patient Education Activity  Goal: Patient/Family Education  Outcome: Progressing Towards Goal     Problem: Patient Education: Go to Patient Education Activity  Goal: Patient/Family Education  Outcome: Progressing Towards Goal

## 2022-02-28 NOTE — PROGRESS NOTES
[facility] CARDIOLOGY PROGRESS NOTE           2/28/2022 7:42 AM    Admit Date: 2/26/2022      Subjective:   No further cp or inc sob. Objective:      Vitals:    02/28/22 0036 02/28/22 0425 02/28/22 0459 02/28/22 0717   BP: 122/60  (!) 153/64    Pulse: 74  80    Resp: 19  19    Temp: 97.2 °F (36.2 °C)  97.3 °F (36.3 °C)    SpO2: 99% 96% 97% 98%   Weight:       Height:           Physical Exam:  General-No Acute Distress  Neck- supple, no JVD  CV- regular rate and rhythm no MRG  Lung- dec bilaterally  Abd- soft, nontender, nondistended  Ext- no edema bilaterally. Skin- warm and dry    Data Review:   Recent Labs     02/28/22  0602 02/27/22  0504 02/26/22  1539 02/26/22  1539    138   < > 135*   K 3.4* 4.1   < > 4.0   MG  --   --   --  1.8   BUN 16 9   < > 9   CREA 0.60 0.50*   < > 0.80   GLU 85 81   < > 129*   WBC 5.7 4.9   < > 8.1   HGB 9.7* 9.1*   < > 10.5*   HCT 29.9* 29.3*   < > 34.1*    177   < > 198    < > = values in this interval not displayed. Assessment/Plan:     Principal Problem:    Chest pain (5/31/2019)        Active Problems:    COPD, very severe ([address] (8/20/2013)          Chronic anxiety (4/27/2017)          Pulmonary mass (2/11/2020)          H/O carotid endarterectomy (9/21/2020)          Other fracture of right femur, initial encounter for closed fracture ([address] (2/4/2022)          Moderate to severe mitral regurgitation (2/8/2022)          EKG, abnormal (2/15/2022)      ////  Corlanor 5 bid is added to block her tendency to symptomatic sinus tachycardia. Increasing her beta blocker may worsen her COPD  She is stable  OK for discharge.           Reanna Vieyra MD  2/28/2022 7:42 AM

## 2022-02-28 NOTE — PROGRESS NOTES
Discharge order is in. Pt presented to ED c/o chest pain with recent PCI on 2/7/22. Pt was discharged on 2/25 from [facility] with [facility]. BHASKAR sent to [facility] for RN/PT/OT. Pt currently on 1L supplemental; she reported that she wears it QHS. However, pts nurse performed a walk test:    Walking oxygen assessment performed. Pt started at 97% sitting in the chair on 1L. Pt walked from the chair to the door way and by the time we got to the door her oxygen was 89% with the oxygen. She felt dizzy and too tired to go on. We walked back to the bad and she sat there for several minutes. Her O2 went back up to 98%. I then had her take her oxygen off and stay sitting. Her oxygen began dropping and was 89%, after standing it dropped down to 85%. She sat back down and that concluded the assessment. She will require continuous oxygen. CM sent O2 qualifier to [facility] and placed portable tank at bedside. She has a concentrator already set up through them. No additional discharge needs identified. Tx goals have been met. Care Management Interventions  PCP Verified by CM: Yes (Kayleen)  Mode of Transport at Discharge:  Other (see comment) (Family)  Transition of Care Consult (CM Consult): Discharge [address] Yevgeniy Christian: Yes  Discharge Durable Medical Equipment: No  Physical Therapy Consult: Yes  Occupational Therapy Consult: Yes  Speech Therapy Consult: No  Support Systems: Spouse/Significant Other,Child(raghavendra),Other Family Member(s),[employer],Friend/Neighbor  Confirm Follow Up Transport: Family  The Plan for Transition of Care is Related to the Following Treatment Goals : Return home and back to her baseline  The Patient and/or Patient Representative was Provided with a Choice of Provider and Agrees with the Discharge Plan?: Yes  Name of the Patient Representative Who was Provided with a Choice of Provider and Agrees with the Discharge Plan: Patient  Freedom of Choice List was Provided with Basic Dialogue that Supports the Patient's Individualized Plan of Care/Goals, Treatment Preferences and Shares the Quality Data Associated with the Providers?: Yes   Resource Information Provided?: No  Discharge Location  Patient Expects to be Discharged to[de-identified] Home with home health ([facility])

## 2022-02-28 NOTE — PROGRESS NOTES
ACUTE PHYSICAL THERAPY GOALS:  (Developed with and agreed upon by patient and/or caregiver. )  LTG:  (1.)Ms. Evelin Eason will move from supine to sit and sit to supine , scoot up and down and roll side to side in bed with INDEPENDENT within 7 treatment day(s). (2.)Ms. Evelin Eason will transfer from bed to chair and chair to bed with MODIFIED INDEPENDENCE using the least restrictive device within 7 treatment day(s). (3.)Ms. Porter will ambulate with MODIFIED INDEPENDENCE for 250+ feet with the least restrictive device within 7 treatment day(s) while maintaining normal vital signs. (4.)Ms. Evelin Eason will be independent with HEP for R LE there ex within 7 treatment days for increased AROM and strength. PHYSICAL THERAPY: Daily Note and AM Treatment Day # 2    Cristian Caraballo is a 66 y.o. female   PRIMARY DIAGNOSIS: Chest pain  Chest pain [R07.9]         ASSESSMENT:     REHAB RECOMMENDATIONS: CURRENT LEVEL OF FUNCTION:  (Most Recently Demonstrated)   Recommendation to date pending progress:  [address] [place] [address]  Equipment:    To Be Determined Bed Mobility:   Standby Assistance  Sit to Stand:   Standby Assistance  Transfers:   Standby Assistance  Gait/Mobility:   Standby Assistance     ASSESSMENT:  Ms. Evelin Eason is making steady progress towards PT goals. Patient ambulated 100' with rolling walker, SBA and several short standing rest breaks due to fatigue and shortness of breath. O2 sats 92% throughout treatment. Will continue efforts.      SUBJECTIVE:   Ms. Evelin Eason states, \"When I talk in 1 word sentences then you know I'm short of breath\"    SOCIAL HISTORY/ LIVING ENVIRONMENT: See initial evaluation  Home Environment: Private residence  # Steps to Enter: 1  One/Two Story Residence: One story  Living Alone: No  Support Systems: Spouse/Significant Other  OBJECTIVE:     PAIN: VITAL SIGNS: LINES/DRAINS:   Pre Treatment:   0Post Treatment: 0   None  O2 Device: Nasal cannula MOBILITY: I Mod I S SBA CGA Min Mod Max Total  NT x2 Comments:   Bed Mobility    Rolling [] [] [] [] [] [] [] [] [] [] []    Supine to Sit [] [] [] [x] [] [] [] [] [] [] []    Scooting [] [] [] [] [] [] [] [] [] [] []    Sit to Supine [] [] [] [] [] [] [] [] [] [] []    Transfers    Sit to Stand [] [] [] [x] [] [] [] [] [] [] []    Bed to Chair [] [] [] [x] [] [] [] [] [] [] []    Stand to Sit [] [] [] [x] [] [] [] [] [] [] []    I=Independent, Mod I=Modified Independent, S=Supervision, SBA=Standby Assistance, CGA=Contact Guard Assistance,   Min=Minimal Assistance, Mod=Moderate Assistance, Max=Maximal Assistance, Total=Total Assistance, NT=Not Tested    BALANCE: Good Fair+ Fair Fair- Poor NT Comments   Sitting Static [x] [] [] [] [] []    Sitting Dynamic [x] [] [] [] [] []              Standing Static [x] [] [] [] [] []    Standing Dynamic [] [x] [] [] [] []      GAIT: I Mod I S SBA CGA Min Mod Max Total  NT x2 Comments:   Level of Assistance [] [] [] [x] [] [] [] [] [] [] []    Distance 100'    DME Rolling Walker    Gait Quality     Weightbearing  Status WBAT     I=Independent, Mod I=Modified Independent, S=Supervision, SBA=Standby Assistance, CGA=Contact Guard Assistance,   Min=Minimal Assistance, Mod=Moderate Assistance, Max=Maximal Assistance, Total=Total Assistance, NT=Not Tested    PLAN:   FREQUENCY/DURATION: PT Plan of Care: 3 times/week for duration of hospital stay or until stated goals are met, whichever comes first.  TREATMENT:     TREATMENT:   (     )  Co-Treatment PT/OT necessary due to patient's decreased overall endurance/tolerance levels, as well as need for high level skilled assistance to complete functional transfers/mobility and functional tasks  Therapeutic Activity (25 Minutes):  Therapeutic activity included Supine to Sit, Scooting, Transfer Training, Ambulation on level ground, Sitting balance , Standing balance and review of pursed lipped breathing and LE exercises to improve functional Mobility, Strength and Activity tolerance.     TREATMENT GRID:  N/A    AFTER TREATMENT POSITION/PRECAUTIONS:  Chair, Needs within reach, RN notified and Visitors at bedside    INTERDISCIPLINARY COLLABORATION:  RN/PCT, PT/PTA and OT/YOUNG    TOTAL TREATMENT DURATION:  PT Patient Time In/Time Out  Time In: 1025  Time Out: [id number]  27 PANCHO Patel

## 2022-02-28 NOTE — PROGRESS NOTES
ACUTE OT GOALS:  (Developed with and agreed upon by patient and/or caregiver.)  1. Patient will complete lower body bathing and dressing with MOD I and adaptive equipment as needed. 2. Patient will complete toileting with MOD I.   3. Patient will tolerate 30 minutes of OT treatment with 1-2 rest breaks to increase activity tolerance for ADLs. 4. Patient will complete functional transfers with MOD I and adaptive equipment as needed. 5. Patient will complete functional mobility for household distances with MOD I and adaptive equipment as needed. 6. Patient will complete self-grooming while standing edge of sink with MOD I and adaptive equipment as needed. Timeframe: 7 visits         OCCUPATIONAL THERAPY ASSESSMENT: Initial Assessment and Daily Note OT Treatment Day # 1    Elis Solorzano is a 66 y.o. female   PRIMARY DIAGNOSIS: Chest pain  Chest pain [R07.9]       Reason for Referral:   ICD-10: Treatment Diagnosis: Generalized Muscle Weakness (M62.81)  OBSERVATION: [place] / [place] B / Product Type: Medicare /   ASSESSMENT:     REHAB RECOMMENDATIONS:   Recommendation to date pending progress:  [address] [place] [address]  (wants to resume her [facility] services which she was current with prior to hospitalization)  Equipment:    None     PRIOR LEVEL OF FUNCTION:  (Prior to Hospitalization)  INITIAL/CURRENT LEVEL OF FUNCTION:  (Based on today's evaluation)   Bathing:   Supervision  Dressing:   Supervision  Feeding/Grooming:   Set Up  Toileting:   Supervision  Functional Mobility:   Standby Assistance x RW Bathing:   Minimal Assistance  Dressing:   Minimal Assistance  Feeding/Grooming:   Set Up  Toileting:   Minimal Assistance  Functional Mobility:   Contact Guard Assistance x RW     ASSESSMENT:  Ms. Isa Reynolds presents with deficits in overall strength, activity tolerance, ADL performance and functional mobility.  Patient was recently discharged for R hip repair from [address] and was home for 1 day before being re-admitted for elevated HR. BUE AROM and strength (4/5) are generally decreased but WFL. SBA for functional bed mobility/transfers; good EOB sitting balance. CGA for sit <> stand; CGA x RW for functional mobility for household distances. Ambulated on 1.5L 02; SOB with exertion. OOB to chair. At this time, Cristian Caraballo is functioning below baseline for ADLs and functional mobility. Pt would benefit from skilled OT services to address OT goals and and plan of care. .     SUBJECTIVE:   Ms. Evelin Eason states, \"I am like a different person now then when I was here last.\"    SOCIAL HISTORY/LIVING ENVIRONMENT: Recently discharged from [address] for 1 day before being readmitted for elevated HR. Has been using RW for mobility and mostly completing her ADLs with supervision.    Home Environment: Private residence  # Steps to Enter: 1  One/Two Story Residence: One story  Living Alone: No  Support Systems: Spouse/Significant Other    OBJECTIVE:     PAIN: VITAL SIGNS: LINES/DRAINS:   Pre Treatment: Pain Screen  Pain Scale 1: Numeric (0 - 10)  Pain Intensity 1: 0  Post Treatment: 0   None  O2 Device: Nasal cannula     GROSS EVALUATION:  BUEs Within Functional Limits Abnormal/ Functional Abnormal/ Non-Functional (see comments) Not Tested Comments:   AROM [x] [] [] []    PROM [] [] [] []    Strength [x] [] [] []    Balance [] [x] [] []    Posture [x] [] [] []    Sensation [x] [] [] []    Coordination [x] [] [] []    Tone [x] [] [] []    Edema [x] [] [] []    Activity Tolerance [] [x] [] []  Dyspnea on exertion; on 1.5 L 02    [] [] [] []      COGNITION/  PERCEPTION: Intact Impaired   (see comments) Comments:   Orientation [x] []    Vision [x] []    Hearing [x] []    Judgment/ Insight [x] []    Attention [x] []    Memory [x] []    Command Following [x] []    Emotional Regulation [x] []     [] []      ACTIVITIES OF DAILY LIVING: I Mod I S SBA CGA Min Mod Max Total NT Comments   BASIC ADLs:              Bathing/ Showering [] [] [] [] [] [] [] [] [] [x]    Toileting [] [] [] [] [] [] [] [] [] [x]    Dressing [] [] [] [] [] [] [] [] [] [x]    Feeding [] [] [] [] [] [] [] [] [] [x]    Grooming [] [] [] [] [] [] [] [] [] [x]    Personal Device Care [] [] [] [] [] [] [] [] [] [x]    Functional Mobility [] [] [] [x] [] [] [] [] [] []  x RW   I=Independent, Mod I=Modified Independent, S=Supervision, SBA=Standby Assistance, CGA=Contact Guard Assistance,   Min=Minimal Assistance, Mod=Moderate Assistance, Max=Maximal Assistance, Total=Total Assistance, NT=Not Tested    MOBILITY: I Mod I S SBA CGA Min Mod Max Total  NT x2 Comments:   Supine to sit [] [] [] [x] [] [] [] [] [] [] []    Sit to supine [] [] [] [] [] [] [] [] [] [x] []    Sit to stand [] [] [] [x] [x] [] [] [] [] [] []    Bed to chair [] [] [] [] [x] [] [] [] [] [] [] X RW   I=Independent, Mod I=Modified Independent, S=Supervision, SBA=Standby Assistance, CGA=Contact Guard Assistance,   Min=Minimal Assistance, Mod=Moderate Assistance, Max=Maximal Assistance, Total=Total Assistance, NT=Not Tested    [address]\"" [address] [id number] AM-PAC 6 Clicks   Daily Activity Inpatient Short Form        How much help from another person does the patient currently need. .. Total A Lot A Little None   1. Putting on and taking off regular lower body clothing? [] 1   [] 2   [x] 3   [] 4   2. Bathing (including washing, rinsing, drying)? [] 1   [] 2   [x] 3   [] 4   3. Toileting, which includes using toilet, bedpan or urinal?   [] 1   [] 2   [x] 3   [] 4   4. Putting on and taking off regular upper body clothing? [] 1   [] 2   [x] 3   [] 4   5. Taking care of personal grooming such as brushing teeth? [] 1   [] 2   [x] 3   [] 4   6. Eating meals? [] 1   [] 2   [x] 3   [] 4   © 2007, Trustees of [address] [place] [address] [address] [address], under license to [employer].  All rights reserved     Score:  Initial: 18 Most Recent: X (Date: -- )   Interpretation of Tool: Represents activities that are increasingly more difficult (i.e. Bed mobility, Transfers, Gait). PLAN:   FREQUENCY/DURATION: OT Plan of Care: 3 times/week for duration of hospital stay or until stated goals are met, whichever comes first.    PROBLEM LIST:   (Skilled intervention is medically necessary to address:)  1. Decreased ADL/Functional Activities  2. Decreased Activity Tolerance  3. Decreased AROM/PROM  4. Decreased Balance  5. Decreased Coordination  6. Decreased Gait Ability  7. Decreased Strength  8. Decreased Transfer Abilities   INTERVENTIONS PLANNED:   (Benefits and precautions of occupational therapy have been discussed with the patient.)  1. Self Care Training  2. Therapeutic Activity  3. Therapeutic Exercise/HEP  4. Neuromuscular Re-education  5. Education     TREATMENT:     EVALUATION: Low Complexity : (Untimed Charge)    TREATMENT:   (     ) - charge capture  Neuromuscular Re-education (23 Minutes): Neuromuscular Re-education included Balance Training, Coordination training, Postural training, Sitting balance training and Standing balance training to improve Balance, Coordination and Postural Control.     TREATMENT GRID:  N/A    AFTER TREATMENT POSITION/PRECAUTIONS:  Chair, Needs within reach, RN notified and Visitors at bedside    INTERDISCIPLINARY COLLABORATION:  RN/PCT, PT/PTA and OT/YOUNG    TOTAL TREATMENT DURATION:  OT Patient Time In/Time Out  Time In: 1025  Time Out: [facility]

## 2022-02-28 NOTE — PROGRESS NOTES
Problem: Falls - Risk of  Goal: *Absence of Falls  Description: Document Huong Embs Fall Risk and appropriate interventions in the flowsheet. Outcome: Resolved/Met  Note: Fall Risk Interventions:  Mobility Interventions: Patient to call before getting OOB         Medication Interventions: Teach patient to arise slowly,Patient to call before getting OOB    Elimination Interventions: Call light in reach    History of Falls Interventions:  Investigate reason for fall         Problem: Patient Education: Go to Patient Education Activity  Goal: Patient/Family Education  Outcome: Resolved/Met     Problem: Patient Education: Go to Patient Education Activity  Goal: Patient/Family Education  Outcome: Resolved/Met     Problem: Patient Education: Go to Patient Education Activity  Goal: Patient/Family Education  Outcome: Resolved/Met     Problem: Unstable angina/NSTEMI: Day of Admission/Day 1  Goal: Off Pathway (Use only if patient is Off Pathway)  Outcome: Resolved/Met  Goal: Activity/Safety  Outcome: Resolved/Met  Goal: Consults, if ordered  Outcome: Resolved/Met  Goal: Diagnostic Test/Procedures  Outcome: Resolved/Met  Goal: Nutrition/Diet  Outcome: Resolved/Met  Goal: Discharge Planning  Outcome: Resolved/Met  Goal: Medications  Outcome: Resolved/Met  Goal: Respiratory  Outcome: Resolved/Met  Goal: Treatments/Interventions/Procedures  Outcome: Resolved/Met  Goal: Psychosocial  Outcome: Resolved/Met  Goal: *Hemodynamically stable  Outcome: Resolved/Met  Goal: *Optimal pain control at patient's stated goal  Outcome: Resolved/Met  Goal: *Lungs clear or at baseline  Outcome: Resolved/Met     Problem: Unstable angina/NSTEMI: Day 2  Goal: Off Pathway (Use only if patient is Off Pathway)  Outcome: Resolved/Met  Goal: Activity/Safety  Outcome: Resolved/Met  Goal: Consults, if ordered  Outcome: Resolved/Met  Goal: Diagnostic Test/Procedures  Outcome: Resolved/Met  Goal: Nutrition/Diet  Outcome: Resolved/Met  Goal: Discharge Planning  Outcome: Resolved/Met  Goal: Medications  Outcome: Resolved/Met  Goal: Respiratory  Outcome: Resolved/Met  Goal: Treatments/Interventions/Procedures  Outcome: Resolved/Met  Goal: Psychosocial  Outcome: Resolved/Met  Goal: *Hemodynamically stable  Outcome: Resolved/Met  Goal: *Optimal pain control at patient's stated goal  Outcome: Resolved/Met  Goal: *Lungs clear or at baseline  Outcome: Resolved/Met     Problem: Unstable Angina/NSTEMI: Discharge Outcomes  Goal: *Hemodynamically stable  Outcome: Resolved/Met  Goal: *Stable cardiac rhythm  Outcome: Resolved/Met  Goal: *Lungs clear or at baseline  Outcome: Resolved/Met  Goal: *Optimal pain control at patient's stated goal  Outcome: Resolved/Met  Goal: *Identifies cardiac risk factors  Outcome: Resolved/Met  Goal: *Verbalizes home exercise program, activity guidelines, cardiac precautions  Outcome: Resolved/Met  Goal: *Verbalizes understanding and describes prescribed diet  Outcome: Resolved/Met  Goal: *Verbalizes name, dosage, time, side effects, and number of days to continue medications  Outcome: Resolved/Met  Goal: *Anxiety reduced or absent  Outcome: Resolved/Met  Goal: *Understands and describes signs and symptoms to report to providers(Stroke Metric)  Outcome: Resolved/Met  Goal: *Describes follow-up/return visits to physicians  Outcome: Resolved/Met  Goal: *Describes available resources and support systems  Outcome: Resolved/Met  Goal: *Influenza immunization  Outcome: Resolved/Met  Goal: *Pneumococcal immunization  Outcome: Resolved/Met  Goal: *Describes smoking cessation resources  Outcome: Resolved/Met

## 2022-03-01 PROCEDURE — 3331090001 HH PPS REVENUE CREDIT

## 2022-03-01 PROCEDURE — 3331090002 HH PPS REVENUE DEBIT

## 2022-03-02 ENCOUNTER — HOME CARE VISIT (OUTPATIENT)
Dept: SCHEDULING | Facility: HOME HEALTH | Age: 72
End: 2022-03-02
Payer: MEDICARE

## 2022-03-02 VITALS
RESPIRATION RATE: 18 BRPM | SYSTOLIC BLOOD PRESSURE: 124 MMHG | DIASTOLIC BLOOD PRESSURE: 68 MMHG | HEART RATE: 78 BPM | OXYGEN SATURATION: 97 % | TEMPERATURE: 97.3 F

## 2022-03-02 PROCEDURE — G0157 HHC PT ASSISTANT EA 15: HCPCS

## 2022-03-02 PROCEDURE — 3331090002 HH PPS REVENUE DEBIT

## 2022-03-02 PROCEDURE — 3331090001 HH PPS REVENUE CREDIT

## 2022-03-03 PROCEDURE — 3331090001 HH PPS REVENUE CREDIT

## 2022-03-03 PROCEDURE — 3331090002 HH PPS REVENUE DEBIT

## 2022-03-04 ENCOUNTER — HOME CARE VISIT (OUTPATIENT)
Dept: SCHEDULING | Facility: HOME HEALTH | Age: 72
End: 2022-03-04
Payer: MEDICARE

## 2022-03-04 VITALS
OXYGEN SATURATION: 97 % | RESPIRATION RATE: 19 BRPM | TEMPERATURE: 97.3 F | HEART RATE: 85 BPM | DIASTOLIC BLOOD PRESSURE: 68 MMHG | SYSTOLIC BLOOD PRESSURE: 126 MMHG

## 2022-03-04 PROCEDURE — 3331090002 HH PPS REVENUE DEBIT

## 2022-03-04 PROCEDURE — 3331090001 HH PPS REVENUE CREDIT

## 2022-03-04 PROCEDURE — G0157 HHC PT ASSISTANT EA 15: HCPCS

## 2022-03-05 PROCEDURE — 3331090001 HH PPS REVENUE CREDIT

## 2022-03-05 PROCEDURE — 3331090002 HH PPS REVENUE DEBIT

## 2022-03-06 PROCEDURE — 3331090002 HH PPS REVENUE DEBIT

## 2022-03-06 PROCEDURE — 3331090001 HH PPS REVENUE CREDIT

## 2022-03-07 PROCEDURE — 3331090002 HH PPS REVENUE DEBIT

## 2022-03-07 PROCEDURE — 3331090001 HH PPS REVENUE CREDIT

## 2022-03-08 ENCOUNTER — HOME CARE VISIT (OUTPATIENT)
Dept: SCHEDULING | Facility: HOME HEALTH | Age: 72
End: 2022-03-08
Payer: MEDICARE

## 2022-03-08 VITALS
OXYGEN SATURATION: 96 % | DIASTOLIC BLOOD PRESSURE: 70 MMHG | SYSTOLIC BLOOD PRESSURE: 128 MMHG | RESPIRATION RATE: 18 BRPM | TEMPERATURE: 97.5 F | HEART RATE: 88 BPM

## 2022-03-08 PROCEDURE — 3331090001 HH PPS REVENUE CREDIT

## 2022-03-08 PROCEDURE — 3331090002 HH PPS REVENUE DEBIT

## 2022-03-08 PROCEDURE — G0157 HHC PT ASSISTANT EA 15: HCPCS

## 2022-03-09 PROCEDURE — 3331090001 HH PPS REVENUE CREDIT

## 2022-03-09 PROCEDURE — 3331090002 HH PPS REVENUE DEBIT

## 2022-03-10 ENCOUNTER — HOME CARE VISIT (OUTPATIENT)
Dept: SCHEDULING | Facility: HOME HEALTH | Age: 72
End: 2022-03-10
Payer: MEDICARE

## 2022-03-10 VITALS
TEMPERATURE: 97.2 F | HEART RATE: 80 BPM | OXYGEN SATURATION: 97 % | RESPIRATION RATE: 18 BRPM | SYSTOLIC BLOOD PRESSURE: 130 MMHG | DIASTOLIC BLOOD PRESSURE: 70 MMHG

## 2022-03-10 PROCEDURE — 3331090002 HH PPS REVENUE DEBIT

## 2022-03-10 PROCEDURE — G0157 HHC PT ASSISTANT EA 15: HCPCS

## 2022-03-10 PROCEDURE — 3331090001 HH PPS REVENUE CREDIT

## 2022-03-11 PROCEDURE — 3331090002 HH PPS REVENUE DEBIT

## 2022-03-11 PROCEDURE — 3331090001 HH PPS REVENUE CREDIT

## 2022-03-12 PROCEDURE — 3331090001 HH PPS REVENUE CREDIT

## 2022-03-12 PROCEDURE — 3331090002 HH PPS REVENUE DEBIT

## 2022-03-13 PROCEDURE — 3331090002 HH PPS REVENUE DEBIT

## 2022-03-13 PROCEDURE — 3331090001 HH PPS REVENUE CREDIT

## 2022-03-14 PROCEDURE — 3331090001 HH PPS REVENUE CREDIT

## 2022-03-14 PROCEDURE — 3331090002 HH PPS REVENUE DEBIT

## 2022-03-15 ENCOUNTER — HOSPITAL ENCOUNTER (INPATIENT)
Age: 72
LOS: 3 days | Discharge: NURSING, REHAB OR LONG-TERM CARE | DRG: 086 | End: 2022-03-18
Attending: EMERGENCY MEDICINE | Admitting: FAMILY MEDICINE
Payer: MEDICARE

## 2022-03-15 ENCOUNTER — APPOINTMENT (OUTPATIENT)
Dept: GENERAL RADIOLOGY | Age: 72
DRG: 086 | End: 2022-03-15
Attending: EMERGENCY MEDICINE
Payer: MEDICARE

## 2022-03-15 ENCOUNTER — HOME CARE VISIT (OUTPATIENT)
Dept: SCHEDULING | Facility: HOME HEALTH | Age: 72
End: 2022-03-15
Payer: MEDICARE

## 2022-03-15 ENCOUNTER — APPOINTMENT (OUTPATIENT)
Dept: CT IMAGING | Age: 72
DRG: 086 | End: 2022-03-15
Attending: EMERGENCY MEDICINE
Payer: MEDICARE

## 2022-03-15 VITALS
OXYGEN SATURATION: 97 % | DIASTOLIC BLOOD PRESSURE: 68 MMHG | RESPIRATION RATE: 18 BRPM | TEMPERATURE: 97.2 F | HEART RATE: 84 BPM | SYSTOLIC BLOOD PRESSURE: 126 MMHG

## 2022-03-15 DIAGNOSIS — J43.2 CENTRILOBULAR EMPHYSEMA (HCC): ICD-10-CM

## 2022-03-15 DIAGNOSIS — S72.001S CLOSED FRACTURE OF RIGHT HIP, SEQUELA: ICD-10-CM

## 2022-03-15 DIAGNOSIS — I25.10 CAD S/P PERCUTANEOUS CORONARY ANGIOPLASTY: ICD-10-CM

## 2022-03-15 DIAGNOSIS — S06.5XAA SDH (SUBDURAL HEMATOMA): Primary | ICD-10-CM
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
## 2022-03-15 DIAGNOSIS — Z98.61 CAD S/P PERCUTANEOUS CORONARY ANGIOPLASTY: ICD-10-CM

## 2022-03-15 DIAGNOSIS — E87.6 HYPOKALEMIA: ICD-10-CM

## 2022-03-15 DIAGNOSIS — M25.551 RIGHT HIP PAIN: ICD-10-CM

## 2022-03-15 DIAGNOSIS — J96.11 CHRONIC RESPIRATORY FAILURE WITH HYPOXIA (HCC): Chronic | ICD-10-CM

## 2022-03-15 PROBLEM — E87.3 HYPOKALEMIC ALKALOSIS: Status: ACTIVE | Noted: 2022-03-15

## 2022-03-15 PROBLEM — F41.9 ANXIETY: Status: ACTIVE | Noted: 2021-05-06

## 2022-03-15 PROBLEM — M51.36 DDD (DEGENERATIVE DISC DISEASE), LUMBAR: Status: ACTIVE | Noted: 2022-02-02

## 2022-03-15 PROBLEM — S32.82XA MULTIPLE CLOSED ANTERIOR-POSTERIOR COMPRESSION FRACTURES OF PELVIS (HCC): Status: ACTIVE | Noted: 2022-02-02

## 2022-03-15 PROBLEM — F32.A DEPRESSION: Status: ACTIVE | Noted: 2021-05-06

## 2022-03-15 LAB
ALBUMIN SERPL-MCNC: 3.5 G/DL (ref 3.2–4.6)
ALBUMIN/GLOB SERPL: 0.9 {RATIO} (ref 1.2–3.5)
ALP SERPL-CCNC: 136 U/L (ref 50–136)
ALT SERPL-CCNC: 22 U/L (ref 12–65)
ANION GAP SERPL CALC-SCNC: 7 MMOL/L (ref 7–16)
APTT PPP: 27.8 SEC (ref 24.1–35.1)
AST SERPL-CCNC: 18 U/L (ref 15–37)
BASOPHILS # BLD: 0 K/UL (ref 0–0.2)
BASOPHILS NFR BLD: 0 % (ref 0–2)
BILIRUB SERPL-MCNC: 0.6 MG/DL (ref 0.2–1.1)
BUN SERPL-MCNC: 13 MG/DL (ref 8–23)
CALCIUM SERPL-MCNC: 9.4 MG/DL (ref 8.3–10.4)
CHLORIDE SERPL-SCNC: 97 MMOL/L (ref 98–107)
CO2 SERPL-SCNC: 34 MMOL/L (ref 21–32)
CREAT SERPL-MCNC: 0.9 MG/DL (ref 0.6–1)
DIFFERENTIAL METHOD BLD: ABNORMAL
EOSINOPHIL # BLD: 0 K/UL (ref 0–0.8)
EOSINOPHIL NFR BLD: 0 % (ref 0.5–7.8)
ERYTHROCYTE [DISTWIDTH] IN BLOOD BY AUTOMATED COUNT: 15.4 % (ref 11.9–14.6)
ERYTHROCYTE [DISTWIDTH] IN BLOOD BY AUTOMATED COUNT: 15.5 % (ref 11.9–14.6)
GLOBULIN SER CALC-MCNC: 3.9 G/DL (ref 2.3–3.5)
GLUCOSE SERPL-MCNC: 112 MG/DL (ref 65–100)
HCT VFR BLD AUTO: 31.5 % (ref 35.8–46.3)
HCT VFR BLD AUTO: 33.6 % (ref 35.8–46.3)
HGB BLD-MCNC: 10.6 G/DL (ref 11.7–15.4)
HGB BLD-MCNC: 9.9 G/DL (ref 11.7–15.4)
IMM GRANULOCYTES # BLD AUTO: 0 K/UL (ref 0–0.5)
IMM GRANULOCYTES NFR BLD AUTO: 1 % (ref 0–5)
INR PPP: 1
LYMPHOCYTES # BLD: 0.6 K/UL (ref 0.5–4.6)
LYMPHOCYTES NFR BLD: 7 % (ref 13–44)
MAGNESIUM SERPL-MCNC: 1.8 MG/DL (ref 1.8–2.4)
MCH RBC QN AUTO: 30.5 PG (ref 26.1–32.9)
MCH RBC QN AUTO: 30.7 PG (ref 26.1–32.9)
MCHC RBC AUTO-ENTMCNC: 31.4 G/DL (ref 31.4–35)
MCHC RBC AUTO-ENTMCNC: 31.5 G/DL (ref 31.4–35)
MCV RBC AUTO: 96.8 FL (ref 79.6–97.8)
MCV RBC AUTO: 97.5 FL (ref 79.6–97.8)
MONOCYTES # BLD: 0.3 K/UL (ref 0.1–1.3)
MONOCYTES NFR BLD: 4 % (ref 4–12)
NEUTS SEG # BLD: 7.9 K/UL (ref 1.7–8.2)
NEUTS SEG NFR BLD: 89 % (ref 43–78)
NRBC # BLD: 0 K/UL (ref 0–0.2)
NRBC # BLD: 0 K/UL (ref 0–0.2)
PLATELET # BLD AUTO: 245 K/UL (ref 150–450)
PLATELET # BLD AUTO: 272 K/UL (ref 150–450)
PMV BLD AUTO: 9.4 FL (ref 9.4–12.3)
PMV BLD AUTO: 9.7 FL (ref 9.4–12.3)
POTASSIUM SERPL-SCNC: 2.6 MMOL/L (ref 3.5–5.1)
PROT SERPL-MCNC: 7.4 G/DL (ref 6.3–8.2)
PROTHROMBIN TIME: 13 SEC (ref 12.6–14.5)
RBC # BLD AUTO: 3.23 M/UL (ref 4.05–5.2)
RBC # BLD AUTO: 3.47 M/UL (ref 4.05–5.2)
SODIUM SERPL-SCNC: 138 MMOL/L (ref 136–145)
WBC # BLD AUTO: 11.3 K/UL (ref 4.3–11.1)
WBC # BLD AUTO: 8.8 K/UL (ref 4.3–11.1)

## 2022-03-15 PROCEDURE — 65610000001 HC ROOM ICU GENERAL

## 2022-03-15 PROCEDURE — 85025 COMPLETE CBC W/AUTO DIFF WBC: CPT

## 2022-03-15 PROCEDURE — 83735 ASSAY OF MAGNESIUM: CPT

## 2022-03-15 PROCEDURE — 70450 CT HEAD/BRAIN W/O DYE: CPT

## 2022-03-15 PROCEDURE — 99285 EMERGENCY DEPT VISIT HI MDM: CPT

## 2022-03-15 PROCEDURE — 85027 COMPLETE CBC AUTOMATED: CPT

## 2022-03-15 PROCEDURE — 73502 X-RAY EXAM HIP UNI 2-3 VIEWS: CPT

## 2022-03-15 PROCEDURE — 85610 PROTHROMBIN TIME: CPT

## 2022-03-15 PROCEDURE — 72125 CT NECK SPINE W/O DYE: CPT

## 2022-03-15 PROCEDURE — 94762 N-INVAS EAR/PLS OXIMTRY CONT: CPT

## 2022-03-15 PROCEDURE — 74011250636 HC RX REV CODE- 250/636: Performed by: EMERGENCY MEDICINE

## 2022-03-15 PROCEDURE — 74011250636 HC RX REV CODE- 250/636: Performed by: FAMILY MEDICINE

## 2022-03-15 PROCEDURE — 36415 COLL VENOUS BLD VENIPUNCTURE: CPT

## 2022-03-15 PROCEDURE — 93005 ELECTROCARDIOGRAM TRACING: CPT | Performed by: EMERGENCY MEDICINE

## 2022-03-15 PROCEDURE — 85730 THROMBOPLASTIN TIME PARTIAL: CPT

## 2022-03-15 PROCEDURE — 3331090001 HH PPS REVENUE CREDIT

## 2022-03-15 PROCEDURE — 3331090002 HH PPS REVENUE DEBIT

## 2022-03-15 PROCEDURE — 74011250637 HC RX REV CODE- 250/637: Performed by: FAMILY MEDICINE

## 2022-03-15 PROCEDURE — 80053 COMPREHEN METABOLIC PANEL: CPT

## 2022-03-15 PROCEDURE — 77030040393 HC DRSG OPTIFOAM GENT MDII -B

## 2022-03-15 PROCEDURE — G0157 HHC PT ASSISTANT EA 15: HCPCS

## 2022-03-15 PROCEDURE — 94640 AIRWAY INHALATION TREATMENT: CPT

## 2022-03-15 PROCEDURE — 2709999900 HC NON-CHARGEABLE SUPPLY

## 2022-03-15 RX ORDER — ACETAMINOPHEN 325 MG/1
650 TABLET ORAL
Status: DISCONTINUED | OUTPATIENT
Start: 2022-03-15 | End: 2022-03-18 | Stop reason: HOSPADM

## 2022-03-15 RX ORDER — MAGNESIUM SULFATE HEPTAHYDRATE 40 MG/ML
2 INJECTION, SOLUTION INTRAVENOUS ONCE
Status: COMPLETED | OUTPATIENT
Start: 2022-03-15 | End: 2022-03-16

## 2022-03-15 RX ORDER — POTASSIUM CHLORIDE 20 MEQ/1
40 TABLET, EXTENDED RELEASE ORAL EVERY 6 HOURS
Status: COMPLETED | OUTPATIENT
Start: 2022-03-15 | End: 2022-03-16

## 2022-03-15 RX ORDER — POLYETHYLENE GLYCOL 3350 17 G/17G
17 POWDER, FOR SOLUTION ORAL DAILY
Status: DISCONTINUED | OUTPATIENT
Start: 2022-03-16 | End: 2022-03-18 | Stop reason: HOSPADM

## 2022-03-15 RX ORDER — ONDANSETRON 2 MG/ML
4 INJECTION INTRAMUSCULAR; INTRAVENOUS
Status: DISCONTINUED | OUTPATIENT
Start: 2022-03-15 | End: 2022-03-18 | Stop reason: HOSPADM

## 2022-03-15 RX ORDER — TRAZODONE HYDROCHLORIDE 50 MG/1
50 TABLET ORAL
Status: DISCONTINUED | OUTPATIENT
Start: 2022-03-15 | End: 2022-03-18 | Stop reason: HOSPADM

## 2022-03-15 RX ORDER — PREDNISONE 10 MG/1
15 TABLET ORAL
Status: DISCONTINUED | OUTPATIENT
Start: 2022-03-16 | End: 2022-03-18 | Stop reason: HOSPADM

## 2022-03-15 RX ORDER — GABAPENTIN 100 MG/1
100 CAPSULE ORAL 3 TIMES DAILY
Status: DISCONTINUED | OUTPATIENT
Start: 2022-03-15 | End: 2022-03-18 | Stop reason: HOSPADM

## 2022-03-15 RX ORDER — IPRATROPIUM BROMIDE AND ALBUTEROL SULFATE 2.5; .5 MG/3ML; MG/3ML
3 SOLUTION RESPIRATORY (INHALATION)
Status: DISCONTINUED | OUTPATIENT
Start: 2022-03-15 | End: 2022-03-18 | Stop reason: HOSPADM

## 2022-03-15 RX ORDER — POTASSIUM CHLORIDE 14.9 MG/ML
20 INJECTION INTRAVENOUS ONCE
Status: COMPLETED | OUTPATIENT
Start: 2022-03-15 | End: 2022-03-15

## 2022-03-15 RX ORDER — LORAZEPAM 0.5 MG/1
0.5 TABLET ORAL
Status: DISCONTINUED | OUTPATIENT
Start: 2022-03-15 | End: 2022-03-18 | Stop reason: HOSPADM

## 2022-03-15 RX ORDER — ACETAMINOPHEN 650 MG/1
650 SUPPOSITORY RECTAL
Status: DISCONTINUED | OUTPATIENT
Start: 2022-03-15 | End: 2022-03-18 | Stop reason: HOSPADM

## 2022-03-15 RX ORDER — DILTIAZEM HYDROCHLORIDE 30 MG/1
30 TABLET, FILM COATED ORAL EVERY 6 HOURS
Status: DISPENSED | OUTPATIENT
Start: 2022-03-15 | End: 2022-03-17

## 2022-03-15 RX ORDER — SODIUM CHLORIDE 9 MG/ML
75 INJECTION, SOLUTION INTRAVENOUS CONTINUOUS
Status: DISPENSED | OUTPATIENT
Start: 2022-03-15 | End: 2022-03-16

## 2022-03-15 RX ORDER — METOPROLOL SUCCINATE 50 MG/1
100 TABLET, EXTENDED RELEASE ORAL DAILY
Status: DISCONTINUED | OUTPATIENT
Start: 2022-03-16 | End: 2022-03-18 | Stop reason: HOSPADM

## 2022-03-15 RX ORDER — ATORVASTATIN CALCIUM 80 MG/1
80 TABLET, FILM COATED ORAL
Status: DISCONTINUED | OUTPATIENT
Start: 2022-03-15 | End: 2022-03-18 | Stop reason: HOSPADM

## 2022-03-15 RX ORDER — ATORVASTATIN CALCIUM 40 MG/1
40 TABLET, FILM COATED ORAL
Status: DISCONTINUED | OUTPATIENT
Start: 2022-03-15 | End: 2022-03-15

## 2022-03-15 RX ORDER — AMOXICILLIN 250 MG
2 CAPSULE ORAL
Status: DISCONTINUED | OUTPATIENT
Start: 2022-03-15 | End: 2022-03-18 | Stop reason: HOSPADM

## 2022-03-15 RX ORDER — NICARDIPINE HYDROCHLORIDE 0.1 MG/ML
5-15 INJECTION INTRAVENOUS
Status: DISCONTINUED | OUTPATIENT
Start: 2022-03-15 | End: 2022-03-15 | Stop reason: SDUPTHER

## 2022-03-15 RX ORDER — LABETALOL HYDROCHLORIDE 5 MG/ML
20 INJECTION, SOLUTION INTRAVENOUS
Status: DISCONTINUED | OUTPATIENT
Start: 2022-03-15 | End: 2022-03-18 | Stop reason: HOSPADM

## 2022-03-15 RX ORDER — POTASSIUM CHLORIDE 29.8 MG/ML
20 INJECTION INTRAVENOUS
Status: DISCONTINUED | OUTPATIENT
Start: 2022-03-15 | End: 2022-03-15 | Stop reason: SDUPTHER

## 2022-03-15 RX ORDER — NITROGLYCERIN 0.4 MG/1
0.4 TABLET SUBLINGUAL AS NEEDED
Status: DISCONTINUED | OUTPATIENT
Start: 2022-03-15 | End: 2022-03-18 | Stop reason: HOSPADM

## 2022-03-15 RX ORDER — ESCITALOPRAM OXALATE 10 MG/1
10 TABLET ORAL DAILY
Status: DISCONTINUED | OUTPATIENT
Start: 2022-03-16 | End: 2022-03-18 | Stop reason: HOSPADM

## 2022-03-15 RX ORDER — OXYCODONE HYDROCHLORIDE 5 MG/1
5 TABLET ORAL
Status: DISCONTINUED | OUTPATIENT
Start: 2022-03-15 | End: 2022-03-18 | Stop reason: HOSPADM

## 2022-03-15 RX ORDER — EZETIMIBE 10 MG/1
10 TABLET ORAL DAILY
Status: DISCONTINUED | OUTPATIENT
Start: 2022-03-16 | End: 2022-03-18 | Stop reason: HOSPADM

## 2022-03-15 RX ORDER — BISACODYL 5 MG
5 TABLET, DELAYED RELEASE (ENTERIC COATED) ORAL DAILY PRN
Status: DISCONTINUED | OUTPATIENT
Start: 2022-03-15 | End: 2022-03-18 | Stop reason: HOSPADM

## 2022-03-15 RX ORDER — PANTOPRAZOLE SODIUM 40 MG/1
40 TABLET, DELAYED RELEASE ORAL
Status: DISCONTINUED | OUTPATIENT
Start: 2022-03-15 | End: 2022-03-18 | Stop reason: HOSPADM

## 2022-03-15 RX ORDER — BUDESONIDE AND FORMOTEROL FUMARATE DIHYDRATE 160; 4.5 UG/1; UG/1
2 AEROSOL RESPIRATORY (INHALATION)
Status: DISCONTINUED | OUTPATIENT
Start: 2022-03-15 | End: 2022-03-18 | Stop reason: HOSPADM

## 2022-03-15 RX ORDER — METHIMAZOLE 5 MG/1
10 TABLET ORAL DAILY
Status: DISCONTINUED | OUTPATIENT
Start: 2022-03-16 | End: 2022-03-18 | Stop reason: HOSPADM

## 2022-03-15 RX ORDER — AZITHROMYCIN 250 MG/1
500 TABLET, FILM COATED ORAL
Status: DISCONTINUED | OUTPATIENT
Start: 2022-03-16 | End: 2022-03-18 | Stop reason: HOSPADM

## 2022-03-15 RX ADMIN — MAGNESIUM SULFATE HEPTAHYDRATE 2 G: 40 INJECTION, SOLUTION INTRAVENOUS at 23:36

## 2022-03-15 RX ADMIN — SODIUM CHLORIDE 75 ML/HR: 900 INJECTION, SOLUTION INTRAVENOUS at 17:25

## 2022-03-15 RX ADMIN — ATORVASTATIN CALCIUM 80 MG: 80 TABLET, FILM COATED ORAL at 21:01

## 2022-03-15 RX ADMIN — GABAPENTIN 100 MG: 100 CAPSULE ORAL at 21:01

## 2022-03-15 RX ADMIN — BUDESONIDE AND FORMOTEROL FUMARATE DIHYDRATE 2 PUFF: 160; 4.5 AEROSOL RESPIRATORY (INHALATION) at 20:08

## 2022-03-15 RX ADMIN — DILTIAZEM HYDROCHLORIDE 30 MG: 30 TABLET, FILM COATED ORAL at 18:18

## 2022-03-15 RX ADMIN — POTASSIUM CHLORIDE 40 MEQ: 20 TABLET, EXTENDED RELEASE ORAL at 17:26

## 2022-03-15 RX ADMIN — OXYCODONE 5 MG: 5 TABLET ORAL at 22:09

## 2022-03-15 RX ADMIN — PANTOPRAZOLE SODIUM 40 MG: 40 TABLET, DELAYED RELEASE ORAL at 17:26

## 2022-03-15 RX ADMIN — POTASSIUM CHLORIDE 20 MEQ: 14.9 INJECTION, SOLUTION INTRAVENOUS at 17:26

## 2022-03-15 NOTE — PROGRESS NOTES
NEUROSURGERY PLAN OF CARE NOTE:     Chart and Imaging Reviewed: Patient Discussed with Dr. Beth Alarcon 66 y.o. female presented to [facility] following fell forward after tripping over her oxygen tubing. I have independently reviewed and interpreted the CT Head WO Contrast which demonstrates a small left temporal convexity subdural hematoma on the right without any evidence of brain compression or mid-line shift. No acute neurosurgical intervention necessary at this time. Please hold anti-coagulation and anti-platelets for now. Recommend repeat CT Head WO contrast in the am. Please call with questions or concerns. I spent a total of 5-10 minutes of medical consultative discussion and review. Brianna Kulkarni.  Christopher Cotto [address]

## 2022-03-15 NOTE — H&P
Hospitalist History and Physical   Admit Date:  3/15/2022  2:01 PM   Name:  Ehsan Dominguez   Age:  66 y.o. Sex:  female  :  1950   MRN:  [id number]   Room:  [facility]    Presenting Complaint: Fall (Pt reports fall today at home on hardwood, tripped over 02 cord. Hit head. Denies any LOC. AAOX4. PERRLA. Pain to R Hip. No bleeding. Pt does take blood thinner. )    Reason(s) for Admission: Subdural hemorrhage following injury ([facility] [S06.5X9A]     History of Present Illness:   Ehsan Dominguez is a 66 y.o. female with medical history of  of R femur fracture s/p repair in 2022, HTN, CAD, COPD who presents right hip pain and right scalp hematoma s/p mechanical fall and hit right side of head and right hip. Fall caused by tripping over 02 tubing. She is on plavix for CAD. Per ED note, thought to be on apixaban OP but per record review this medicine is not found. Work up significant for  CT [facility] which demonstrates a small left temporal convexity subdural hematoma on the right without any evidence of brain compression or mid-line shift abd Hip XRPostoperative changes from repair of subacute right hip fracture and Medial displacement of lesser trochanter. No acute neuro intervention necessary at this time per NSGY recommend repeat CTHw/o contrast in AM and hold all AC/antiplatents. ED d/w hip XR w/ ortho who suspect findings are chronic and not new. Labs significant for Labs: K 2.6 HCO3 34 Scr 0.9 INR 1.0. rec'd 40 meq IV kcl in ED. Hospitalist consulted to admit. She c/o mild HA, severe back pain. She denies focal weakness, sensory changes, vision changes or new neuro deficits. She reports taking oxycodone 5 mg @ home for back pain with relief. Is being referred to pain management for further refills per her report. Denies h/o n/v or diarrhea. Has been taking lasix for ankle swelling which is now improved.      Review of Systems:  10 systems reviewed and negative except as noted in HPI. Assessment & Plan:     Post-traumatic SDH 2/2 mechanical fall on Apixaban   Admit to ICU   - hold AC and antiplatelet   - CT brain non-contrast in AM   - SBP goal 160-140. IV labetalol 10 mg or nicardipine gtt to titration of blood pressure. BP soft so will hold off on initiation PO meds. - airway protection as noted below  - neurochekcs per ICU protocol  - head of bed at 30 degrees and elevated to decrease arterial pressure and promote venous drainage  - neurosurgery on consult; appreciate assistance and recommendations    Hypokalemic alkalosis - diuretic induced. K 2.6. s/p kcl 40 IV in ED. Start PO 40 meq q6h x 4 doses. Add on mag to labs. Monitor on telemetry. Very severe COPD // Chronic hypoxic respiratory failure - stable on home o2, 1 L. Chronic prednisone 15 mg daily. LAMA/LABA/ICS. Prn duonebs. Azithromycin MWF. R femur fracture s/p repair in Feb 2022 - ortho consulted by ED for medial displacement noted on XR. PT/OT. left mid lung mass--Follows Dr. Kelsea Albert pulmonology    Ischemic CM / CAD - s/p PCI x 2 stents on 2/7/22. Hold antiplatelets. Cont statin, BB. Severe MR - cardiology FU OP     Osteoporosis of multiple sites // chronic back pain - T7, T12 and L1 compression fractures, recent femur fracture s/p surgery. Pain medicine. Trial low do se gabapentin     Left lung mass // pulmonary nodules - concerning for malignancy. previously underwent PET 2/2020 and bronch negative for malignant cells. Hyperthyroidism - TSH low, FT4 elevated 2/2022 repeat TSH. Resume methimazole     Anemia - chronic. Monitor h/h     Patient is critically ill. Without intervention, there is a high probability of acute organ impairment or life-threatening deterioration in the patient's condition from: ICH, severe hypokalemia, seizures, respiratory failure     Total critical care time spent: 44 minutes. Time is indicative of direct patient attendance at bedside and on the patient's floor nearby. Includes time spent at bedside performing history and exam, performing chart review, discussing findings and treatment plan with patient and/or family, discussing patient with consultants and colleagues, ordering and reviewing pertinent laboratory and radiographic evaluations, and discussing patient with nursing staff. Time excludes procedures. Dispo/Discharge Planning:     Admit ICU     Diet: ADULT DIET Regular  VTE ppx: SCDs  Code status: DNR    Hospital Problems as of 3/15/2022 Date Reviewed: 2/24/2022          Codes Class Noted - Resolved POA    * (Principal) Post[facility]traumatic subd[facility] hematoma ([facility] ICD-10-CM: N79.7J8P  ICD-9-CM: 852.20  3/15/2022 - Present Yes        Hypokalemic alkalosis ICD-10-CM: E87.3  ICD-9-CM: 276.3  3/15/2022 - Present Yes        Closed right hip fracture ([facility] ICD-10-CM: S72.001A  ICD-9-CM: 820.8  2/4/2022 - Present Yes        DNR (do not resuscitate) ICD-10-CM: Z66  ICD-9-CM: V49.86  11/9/2021 - Present Yes    Overview Signed 11/9/2021 12:25 PM by Sri Rodriguez NP     POST form completed - DNR but full treatment, including intubation. No TRACH. No PEG.               Centrilobular emphysema ([facility] ICD-10-CM: J43.2  ICD-9-CM: 492.8  12/20/2019 - Present Yes        Chronic respiratory failure with hypoxia (HCC) (Chronic) ICD-10-CM: J96.11  ICD-9-CM: 518.83, 799.02  6/8/2019 - Present Yes        CAD S/P percutaneous coronary angioplasty ICD-10-CM: I25.10, Z98.61  ICD-9-CM: 414.01, V45.82  5/31/2019 - Present Yes        COPD, very severe ([facility] (Chronic) ICD-10-CM: J44.9  ICD-9-CM: 496  8/20/2013 - Present Yes              Past History:  Past Medical History:   Diagnosis Date    Arrhythmia     Arthritis     CAD (coronary artery disease) 2009, 8/19/2013    PCI,  Nolan Nolan     Carotid stenosis, right     endarterectomy 11/25/19    Chronic anxiety 4/27/2017    COPD     recent referral to [address] for lung transplant    Depression 5/6/2021    Emphysema lung ([facility]     GERD (gastroesophageal reflux disease)     controlled with medication    History of echocardiogram 2019    History of echocardiogram     echo 19 LVEF 55-60%    Hypertension     Nausea & vomiting     Oxygen dependent     1 liter at night    Pleurisy     Thyroid disease     pt denies     Past Surgical History:   Procedure Laterality Date    HX GYN      rebuilt cervix    HX HEART CATHETERIZATION  2019    last stent- per patient- 10 stents total    HX OTHER SURGICAL      HX TONSIL AND ADENOIDECTOMY      VASCULAR SURGERY PROCEDURE UNLIST Right 2019    carotid endarterectomy      Allergies   Allergen Reactions    Promethazine Nausea and Vomiting and Other (comments)     Severe vomiting       Social History     Tobacco Use    Smoking status: Former Smoker     Packs/day: 0.75     Years: 53.00     Pack years: 39.75     Types: Cigarettes     Quit date:      Years since quittin.2    Smokeless tobacco: Never Used   Substance Use Topics    Alcohol use: Yes     Comment: rarely      Family History   Problem Relation Age of Onset    No Known Problems Mother         adopted      Family history reviewed and negative except as noted above.     Immunization History   Administered Date(s) Administered    COVID-19, Pfizer Purple top, DILUTE for use, 12+ yrs, 30mcg/0.3mL dose 2021, 2021, 2021    Influenza High Dose Vaccine PF 2014, 10/08/2015, 10/10/2016, 2017, 2018    Influenza Vaccine 10/01/2012, 2013    Influenza Vaccine (Tri) Adjuvanted (>65 Yrs FLUAD TRI [id number]) 2019    Influenza, High-dose, Quadrivalent (>65 Yrs Fluzone High Dose Quad 18914) 10/06/2020, 2021    Pneumococcal Conjugate (PCV-13) 2014    Pneumococcal Polysaccharide (PPSV-23) 2008, 2014, 2018    TB Skin Test (PPD) Intradermal 2022, 2022    Tdap 2018    Zoster Recombinant 2019, 2019     Prior to Admit Medications:  Current Outpatient Medications   Medication Instructions    acetaminophen (TYLENOL) 650 mg, Oral, EVERY 8 HOURS    acetaminophen (TYLENOL) 1,000 mg, Oral, EVERY 8 HOURS AS NEEDED    albuterol (PROVENTIL VENTOLIN) 2.5 mg, Nebulization, EVERY 6 HOURS AS NEEDED, ICD-10-J44.9, please bill to Med B    aspirin delayed-release 81 mg, Oral, DAILY    atorvastatin (LIPITOR) 80 mg tablet TAKE 1 TABLET BY MOUTH DAILY    azithromycin (ZITHROMAX) 500 mg, Oral, 3 TIMES A WEEK (MON,WED & FRI)    budesonide-formoteroL (Symbicort) 160-4.5 mcg/actuation HFAA 2 Puffs, Inhalation, 2 TIMES DAILY    cetirizine (ZYRTEC) 10 mg, Oral, DAILY AS NEEDED    escitalopram oxalate (LEXAPRO) 10 mg, Oral, DAILY, PCP will continue to prescribe this medication.  ezetimibe (ZETIA) 10 mg, Oral, DAILY    guaiFENesin (MUCINEX) 1,200 mg, Oral, EVERY 12 HOURS, Pulmonologist or PCP will continue to prescribe this medication.  lidocaine 4 % patch Apply patch to affected area for 12 hours a day and remove for 12 hours a day.  LORazepam (ATIVAN) 0.5 mg, Oral, EVERY 8 HOURS AS NEEDED, PCP will prescribe this medication if it is necessary to continue.  magnesium oxide (MAG-OX) 400 mg, Oral, DAILY    methIMAzole (TAPAZOLE) 10 mg, Oral, DAILY, PCP will continue to prescribe this medication.  metoprolol succinate (TOPROL-XL) 100 mg, Oral, DAILY    multivitamin (ONE A DAY) tablet 1 Tablet, Oral, DAILY    nitroglycerin (NITROSTAT) 0.4 mg, SubLINGual, 3 TIMES DAILY AS NEEDED, Up to 3 doses.     nystatin (MYCOSTATIN) 500,000 Units, Oral, 4 TIMES DAILY, swish and spit    OTHER Handicap placard    OXYGEN-AIR DELIVERY SYSTEMS 1 L/min, Nasal, CONTINUOUS    pantoprazole (PROTONIX) 40 mg tablet TAKE 1 TABLET BY MOUTH TWICE DAILY    polyethylene glycol (MIRALAX) 17 g, Oral, DAILY    predniSONE (DELTASONE) 15 mg, Oral, DAILY    roflumilast (DALIRESP) 500 mcg, Oral, DAILY    senna-docusate (PERICOLACE) 8.6-50 mg per tablet 2 Tablets, Oral, DAILY    tiotropium (SPIRIVA WITH HANDIHALER) 18 mcg, Inhalation, DAILY    traZODone (DESYREL) 25 mg, Oral, BEDTIME PRN, PCP will prescribe this medication if it is necessary to continue. Objective:     Patient Vitals for the past 24 hrs:   Temp Pulse Resp BP SpO2   03/15/22 1845  68 (!) 33  99 %   03/15/22 1830  66 (!) 44 (!) 128/58 100 %   03/15/22 1815  65 (!) 33  99 %   03/15/22 1800  72 21 (!) 121/57 99 %   03/15/22 1730  70 19 129/78 99 %   03/15/22 1715  69 18 (!) 123/56 99 %   03/15/22 1700  64 18 (!) 130/58 99 %   03/15/22 1545  67 17 (!) 117/57 99 %   03/15/22 1515  67 18 122/60 98 %   03/15/22 1409     97 %   03/15/22 1405 98.5 °F (36.9 °C) 72 16 (!) 119/54 97 %     Oxygen Therapy  O2 Sat (%): 99 % (03/15/22 1845)  Pulse via Oximetry: 69 beats per minute (03/15/22 1845)  O2 Device: Nasal cannula (03/15/22 1715)  Skin Assessment: Clean, dry, & intact (03/15/22 1409)  Skin Protection for O2 Device: No (03/15/22 1409)  O2 Flow Rate (L/min): 1 l/min (03/15/22 1715)    Estimated body mass index is 20.63 kg/m² as calculated from the following:    Height as of this encounter: 5' 8\" (1.727 m). Weight as of this encounter: 61.6 kg (135 lb 11.2 oz). No intake or output data in the 24 hours ending 03/15/22 1936      Physical Exam:    Blood pressure (!) 128/58, pulse 68, temperature 98.5 °F (36.9 °C), resp. rate (!) 33, height 5' 8\" (1.727 m), weight 61.6 kg (135 lb 11.2 oz), SpO2 99 %. General:    NAD. Chronically ill appearing   Head:  Normocephalic, atraumatic  Eyes:  Sclerae appear normal.  Pupils equally round. EOMI   ENT:  Nares appear normal, no drainage. Moist oral mucosa  Neck:  No restricted ROM. Trachea midline   CV:   RRR. No m/r/g. No jugular venous distension. Lungs:   No increased Wob; diminished breath sounds throughout   Abdomen: Bowel sounds present. Soft, nontender, nondistended. Extremities: No cyanosis or clubbing.   No edema  Skin:     No rashes and normal coloration. Warm and dry. Neuro:  CN II-XII grossly intact. Moving all extremities Sensation intact. A&Ox3  Psych:  Normal mood and affect. I have reviewed ordered lab tests and independently visualized imaging below:    Last 24hr Labs:  Recent Results (from the past 24 hour(s))   CBC WITH AUTOMATED DIFF    Collection Time: 03/15/22  3:02 PM   Result Value Ref Range    WBC 8.8 4.3 - 11.1 K/uL    RBC 3.47 (L) 4.05 - 5.2 M/uL    HGB 10.6 (L) 11.7 - 15.4 g/dL    HCT 33.6 (L) 35.8 - 46.3 %    MCV 96.8 79.6 - 97.8 FL    MCH 30.5 26.1 - 32.9 PG    MCHC 31.5 31.4 - 35.0 g/dL    RDW 15.5 (H) 11.9 - 14.6 %    PLATELET 754 524 - 835 K/uL    MPV 9.7 9.4 - 12.3 FL    ABSOLUTE NRBC 0.00 0.0 - 0.2 K/uL    DF AUTOMATED      NEUTROPHILS 89 (H) 43 - 78 %    LYMPHOCYTES 7 (L) 13 - 44 %    MONOCYTES 4 4.0 - 12.0 %    EOSINOPHILS 0 (L) 0.5 - 7.8 %    BASOPHILS 0 0.0 - 2.0 %    IMMATURE GRANULOCYTES 1 0.0 - 5.0 %    ABS. NEUTROPHILS 7.9 1.7 - 8.2 K/UL    ABS. LYMPHOCYTES 0.6 0.5 - 4.6 K/UL    ABS. MONOCYTES 0.3 0.1 - 1.3 K/UL    ABS. EOSINOPHILS 0.0 0.0 - 0.8 K/UL    ABS. BASOPHILS 0.0 0.0 - 0.2 K/UL    ABS. IMM. GRANS. 0.0 0.0 - 0.5 K/UL   METABOLIC PANEL, COMPREHENSIVE    Collection Time: 03/15/22  3:02 PM   Result Value Ref Range    Sodium 138 136 - 145 mmol/L    Potassium 2.6 (L) 3.5 - 5.1 mmol/L    Chloride 97 (L) 98 - 107 mmol/L    CO2 34 (H) 21 - 32 mmol/L    Anion gap 7 7 - 16 mmol/L    Glucose 112 (H) 65 - 100 mg/dL    BUN 13 8 - 23 MG/DL    Creatinine 0.90 0.6 - 1.0 MG/DL    GFR est AA >60 >60 ml/min/1.73m2    GFR est non-AA >60 >60 ml/min/1.73m2    Calcium 9.4 8.3 - 10.4 MG/DL    Bilirubin, total 0.6 0.2 - 1.1 MG/DL    ALT (SGPT) 22 12 - 65 U/L    AST (SGOT) 18 15 - 37 U/L    Alk.  phosphatase 136 50 - 136 U/L    Protein, total 7.4 6.3 - 8.2 g/dL    Albumin 3.5 3.2 - 4.6 g/dL    Globulin 3.9 (H) 2.3 - 3.5 g/dL    A-G Ratio 0.9 (L) 1.2 - 3.5     PROTHROMBIN TIME + INR    Collection Time: 03/15/22  3:02 PM Result Value Ref Range    Prothrombin time 13.0 12.6 - 14.5 sec    INR 1.0     PTT    Collection Time: 03/15/22  3:02 PM   Result Value Ref Range    aPTT 27.8 24.1 - 35.1 SEC   MAGNESIUM    Collection Time: 03/15/22  3:02 PM   Result Value Ref Range    Magnesium 1.8 1.8 - 2.4 mg/dL       All Micro Results     None          Other Studies:  XR HIP RT W OR WO PELV 2-3 VWS    Result Date: 3/15/2022  RIGHT HIP SERIES HISTORY:  Hip pain after fall COMPARISON: 2/4/2022 AP pelvis FINDINGS: Hardware secures a subacute intertrochanteric fracture of the right hip. There is medial displacement of the lesser trochanter since the prior postoperative film. Callus formation is present. There is no new displaced pelvic fracture. Postoperative changes from repair of subacute right hip fracture. Medial displacement of lesser trochanter. CT HEAD WO CONT    Result Date: 3/15/2022  HEAD CT WITHOUT CONTRAST  3/15/2022 HISTORY:   Fall; closed head injury  Fall hit right forehead-Denies dizziness at the time -Neck pain on right side TECHNIQUE: Noncontrast axial images were obtained through the brain. All CT scans at this facility used dose modulation, interactive reconstruction and/or weight based dosing when appropriate to reduce radiation dose to as low as reasonably achievable. COMPARISON: None FINDINGS: A thin linear hyperdensity is present along the right temporal convexity. This may be a focal area of dural thickening or a thin smear subdural hematoma. There is no appreciable mass effect 1 the brain. . A right frontal scalp hematoma is present. There is no hydrocephalus , intra-axial mass. There are no displaced skull fractures. The mastoid air cells and paranasal sinuses are clear where imaged. 1. Right frontal scalp hematoma. 2. Thin extra-axial structure along the right frontal convexity measuring approximately 2 mm in thickness. This may be a very small subdural hematoma.  The findings were called to Dr. Chang Ruelas on 3/15/2022 at 2:48 PM by Dr. Shawn[facility] Maria. [facility]    Result Date: 3/15/2022  CT CERVICAL SPINE WITHOUT CONTRAST HISTORY:  s/p fall; Fall hit right forehead-Denies dizziness at the time -Neck pain on right side TECHNIQUE: Noncontrast axial images were obtained from the craniocervical junction through T2. Multiplanar reformatted images were generated. All CT scans at this facility used dose modulation, iterative reconstruction and/or weight based dosing when appropriate to reduce radiation dose to as low as reasonably achievable. COMPARISON:  None FINDINGS:  The cervical vertebrae are normal in height and alignment. There is no prevertebral soft tissue swelling. The articular facets overlap appropriately. Axial images demonstrate no displaced cervical spine fracture. Included portions of the lung  apices demonstrate centrilobular emphysematous disease. Biapical pleural parenchymal scarring is present. Degenerative spondylosis is present with uncovertebral osteophytes. Foraminal stenosis is present at the C5-C6 and C6-C7 levels. 1. No acute findings in the cervical spine. 2. Foraminal stenosis and degenerative spondylosis at C5-C6 and C6-C7. Signed:  Nga Dias DO    Part of this note may have been written by using a voice dictation software. The note has been proof read but may still contain some grammatical/other typographical errors.

## 2022-03-15 NOTE — PROGRESS NOTES
Pt arrived from the ER. A&O X 4, NIHSS 0. Skin clean, dry, and intact, with scattered bruising. NS and potassium infusing.

## 2022-03-15 NOTE — ED PROVIDER NOTES
18-year-old female with extensive medical history including of CAD s/p PCI, COPD on chronic steroids wearing 1L NC at home, anxiety, GERD, HTN, hyperthyroidism, left mid lung mass, history of right femur fracture status post gamma nail insertion on 2/4/22 tenths with report of mechanical trip and fall tripping over her O2 tubing landing on her head and her right hip. Reports hitting right forehead on unknown object. Denies LOC. States moderate to severe pain localized to right hip. Reports increased swelling and tenderness to palpation. States that she has been unable to ambulate on affected extremity without assistance. Denies numbness, tingling, weakness. Denies neck pain, back pain, chest pain, shortness of breath, nausea, vomiting, fever, chills, urinary complaints. States that she previously was anticoagulated on Plavix but is currently taking Eliquis. The history is provided by the patient and the EMS personnel. No  was used. Fall  The accident occurred less than 1 hour ago. The fall occurred while walking. She fell from a height of ground level. Impact surface: unknown  There was no blood loss. The point of impact was the head and right hip. The pain is present in the head and right hip. The pain is at a severity of 6/10. The pain is moderate. She was not ambulatory at the scene. There was no entrapment after the fall. There was no drug use involved in the accident. There was no alcohol use involved in the accident. Associated symptoms include headaches. Pertinent negatives include no fever, no numbness, no abdominal pain, no nausea, no vomiting, no extremity weakness, no loss of consciousness, no tingling and no laceration.         Past Medical History:   Diagnosis Date    Arrhythmia     Arthritis     CAD (coronary artery disease) 2009, 8/19/2013    PCI,  Nettie Collins     Carotid stenosis, right     endarterectomy 11/25/19    Chronic anxiety 4/27/2017    COPD     recent referral to [facility] for lung transplant    Emphysema lung ([facility] [address]     GERD (gastroesophageal reflux disease)     controlled with medication    History of echocardiogram 2019    History of echocardiogram     echo 19 LVEF 55-60%    Hypertension     Nausea & vomiting     Oxygen dependent     1 liter at night    Pleurisy     Thyroid disease     pt denies       Past Surgical History:   Procedure Laterality Date    HX GYN      rebuilt cervix    HX HEART CATHETERIZATION  2019    last stent- per patient- 10 stents total    HX OTHER SURGICAL      HX TONSIL AND ADENOIDECTOMY      VASCULAR SURGERY PROCEDURE UNLIST Right 2019    carotid endarterectomy         Family History:   Problem Relation Age of Onset    No Known Problems Mother         adopted       Social History     Socioeconomic History    Marital status:      Spouse name: Not on file    Number of children: Not on file    Years of education: Not on file    Highest education level: Not on file   Occupational History    Occupation: [employer]     Employer: SELF EMPLOYED     Comment: home   Tobacco Use    Smoking status: Former Smoker     Packs/day: 0.75     Years: 53.00     Pack years: 39.75     Types: Cigarettes     Quit date:      Years since quittin.2    Smokeless tobacco: Never Used   Substance and Sexual Activity    Alcohol use: Yes     Comment: rarely    Drug use: No    Sexual activity: Not on file   Other Topics Concern     Service Not Asked    Blood Transfusions Not Asked    Caffeine Concern Not Asked    Occupational Exposure Not Asked    Hobby Hazards Not Asked    Sleep Concern Not Asked    Stress Concern Not Asked    Weight Concern Not Asked    Special Diet Not Asked    Back Care Not Asked    Exercise Not Asked    Bike Helmet Not Asked    [address] Not Asked    Self-Exams Not Asked   Social History Narrative    , has 2 children, 1 step child.   Lives alone.  Works as an analyst.      Social Determinants of Health     Financial Resource Strain:     Difficulty of Paying Living Expenses: Not on file   Food Insecurity:     Worried About [address] in the Last Year: Not on file    [address] in the Last Year: Not on file   Transportation Needs:     Lack of Transportation (Medical): Not on file    Lack of Transportation (Non-Medical): Not on file   Physical Activity:     Days of Exercise per Week: Not on file    Minutes of Exercise per Session: Not on file   Stress:     Feeling of Stress : Not on file   Social Connections:     Frequency of Communication with Friends and Family: Not on file    Frequency of Social Gatherings with Friends and Family: Not on file    Attends Mu-ism Services: Not on file    Active Member of [address] [place] [address] or Organizations: Not on file    Attends Club or Organization Meetings: Not on file    Marital Status: Not on file   Intimate Partner Violence:     Fear of Current or Ex-Partner: Not on file    Emotionally Abused: Not on file    Physically Abused: Not on file    Sexually Abused: Not on file   Housing Stability:     Unable to Pay for Housing in the Last Year: Not on file    Number of [place] in the Last Year: Not on file    Unstable Housing in the Last Year: Not on file         ALLERGIES: Promethazine    Review of Systems   Constitutional: Negative for chills, diaphoresis, fatigue and fever. HENT: Negative for congestion and nosebleeds. Eyes: Negative for redness and visual disturbance. Respiratory: Negative for cough and shortness of breath. Cardiovascular: Negative for chest pain and palpitations. Gastrointestinal: Negative for abdominal pain, constipation, diarrhea, nausea and vomiting. Genitourinary: Negative for dysuria and flank pain. Musculoskeletal: Positive for arthralgias, gait problem and joint swelling.  Negative for back pain, extremity weakness, myalgias, neck pain and neck stiffness. Skin: Negative for color change and pallor. Neurological: Positive for headaches. Negative for dizziness, tingling, seizures, loss of consciousness, syncope, weakness, light-headedness and numbness. Hematological: Does not bruise/bleed easily. Psychiatric/Behavioral: Negative for confusion. Vitals:    03/15/22 1405 03/15/22 1409   BP: (!) 119/54    Pulse: 72    Resp: 16    Temp: 98.5 °F (36.9 °C)    SpO2: 97% 97%   Weight: 61.6 kg (135 lb 11.2 oz)    Height: 5' 8\" (1.727 m)             Physical Exam  Vitals and nursing note reviewed. HENT:      Head: Normocephalic. Comments: Right forehead scalp hematoma. No laceration present. No findings suggestive of basilar skull fracture. Nose: Nose normal.      Mouth/Throat:      Mouth: Mucous membranes are moist.   Eyes:      Extraocular Movements: Extraocular movements intact. Pupils: Pupils are equal, round, and reactive to light. Neck:      Comments: FROM. No midline C-spine TTP. Cardiovascular:      Rate and Rhythm: Normal rate. Pulses: Normal pulses. Heart sounds: Normal heart sounds. Pulmonary:      Effort: Pulmonary effort is normal.      Breath sounds: Normal breath sounds. Abdominal:      General: Bowel sounds are normal.      Palpations: Abdomen is soft. Tenderness: There is no abdominal tenderness. There is no guarding or rebound. Musculoskeletal:         General: Swelling and tenderness present. Cervical back: Normal range of motion. Comments: No midline T-spine, L-spine tenderness palpation. No step-off. Moderate swelling and tenderness localized to right hip. S/p R gamma nail insertion; surgical site c/d/i. Skin:     General: Skin is warm. Findings: No erythema, laceration or rash. Neurological:      General: No focal deficit present. Mental Status: She is alert and oriented to person, place, and time. Cranial Nerves: No cranial nerve deficit.       Sensory: No sensory deficit. Motor: No weakness. Comments: No focal deficits. Strength 5 out of 5 throughout. Normal sensory exam.  No saddle anesthesia. MDM  Number of Diagnoses or Management Options  Fall, initial encounter: new and requires workup  Hypokalemia: new and requires workup  Right hip pain  SDH (subdural hematoma) [facility]): new and requires workup  Diagnosis management comments: Discussed x-ray with Ortho. States lesser trochanteric displacement is likely chronically displaced. CT head with right frontal scalp hematoma and findings concerning for small subdural hematoma. Patient is anticoagulated on Eliquis at this time. Neurosurgery consulted. Awaiting callback  ===============================  Dr. Rico Dasilva recommends hospitalist admit for observation overnight and repeat scan in a.m. Recommends holding antiplatelet and anticoagulation at this time. Will consult hospitalist for admission. Labs unremarkable with exception of potassium of 2.6. Will replete IV.        Amount and/or Complexity of Data Reviewed  Clinical lab tests: ordered and reviewed  Tests in the radiology section of CPT®: ordered and reviewed  Tests in the medicine section of CPT®: ordered and reviewed  Review and summarize past medical records: yes  Discuss the patient with other providers: yes  Independent visualization of images, tracings, or specimens: yes    Risk of Complications, Morbidity, and/or Mortality  Presenting problems: high  Diagnostic procedures: high  Management options: high  General comments: Results Include:    Recent Results (from the past 24 hour(s))  -CBC WITH AUTOMATED DIFF:   Collection Time: 03/15/22  3:02 PM       Result                      Value             Ref Range           WBC                         8.8               4.3 - 11.1 K*       RBC                         3.47 (L)          4.05 - 5.2 M*       HGB                         10.6 (L)          11.7 - 15.4 *       HCT 33.6 (L)          35.8 - 46.3 %       MCV                         96.8              79.6 - 97.8 *       MCH                         30.5              26.1 - 32.9 *       MCHC                        31.5              31.4 - 35.0 *       RDW                         15.5 (H)          11.9 - 14.6 %       PLATELET                    272               150 - 450 K/*       MPV                         9.7               9.4 - 12.3 FL       ABSOLUTE NRBC               0.00              0.0 - 0.2 K/*       DF                          AUTOMATED                             NEUTROPHILS                 89 (H)            43 - 78 %           LYMPHOCYTES                 7 (L)             13 - 44 %           MONOCYTES                   4                 4.0 - 12.0 %        EOSINOPHILS                 0 (L)             0.5 - 7.8 %         BASOPHILS                   0                 0.0 - 2.0 %         IMMATURE GRANULOCYTES       1                 0.0 - 5.0 %         ABS. NEUTROPHILS            7.9               1.7 - 8.2 K/*       ABS. LYMPHOCYTES            0.6               0.5 - 4.6 K/*       ABS. MONOCYTES              0.3               0.1 - 1.3 K/*       ABS. EOSINOPHILS            0.0               0.0 - 0.8 K/*       ABS. BASOPHILS              0.0               0.0 - 0.2 K/*       ABS. IMM.  GRANS.            0.0               0.0 - 0.5 K/*  -METABOLIC PANEL, COMPREHENSIVE:   Collection Time: 03/15/22  3:02 PM       Result                      Value             Ref Range           Sodium                      138               136 - 145 mm*       Potassium                   2.6 (L)           3.5 - 5.1 mm*       Chloride                    97 (L)            98 - 107 mmo*       CO2                         34 (H)            21 - 32 mmol*       Anion gap                   7                 7 - 16 mmol/L       Glucose                     112 (H)           65 - 100 mg/*       BUN                         13                8 - 23 MG/DL        Creatinine                  0.90              0.6 - 1.0 MG*       GFR est AA                  >60               >60 ml/min/1*       GFR est non-AA              >60               >60 ml/min/1*       Calcium                     9.4               8.3 - 10.4 M*       Bilirubin, total            0.6               0.2 - 1.1 MG*       ALT (SGPT)                  22                12 - 65 U/L         AST (SGOT)                  18                15 - 37 U/L         Alk. phosphatase            136               50 - 136 U/L        Protein, total              7.4               6.3 - 8.2 g/*       Albumin                     3.5               3.2 - 4.6 g/*       Globulin                    3.9 (H)           2.3 - 3.5 g/*       A-G Ratio                   0.9 (L)           1.2 - 3.5      -PROTHROMBIN TIME + INR:   Collection Time: 03/15/22  3:02 PM       Result                      Value             Ref Range           Prothrombin time            13.0              12.6 - 14.5 *       INR                         1.0                              -PTT:   Collection Time: 03/15/22  3:02 PM       Result                      Value             Ref Range           aPTT                        27.8              24.1 - 35.1 *      Patient Progress  Patient progress: stable    ED Course as of 03/15/22 1604   Tue Mar 15, 2022   1448 XR R hip FINDINGS: Hardware secures a subacute intertrochanteric fracture of the right  hip. There is medial displacement of the lesser trochanter since the prior  postoperative film. Callus formation is present. There is no new displaced  pelvic fracture.     IMPRESSION  Postoperative changes from repair of subacute right hip fracture. Medial displacement of lesser trochanter. [DF]   1489 CT C-spine   FINDINGS:  The cervical vertebrae are normal in height and alignment. There is  no prevertebral soft tissue swelling. The articular facets overlap  appropriately.  Axial images demonstrate no displaced cervical spine fracture. Included portions of the lung  apices demonstrate centrilobular emphysematous  disease. Biapical pleural parenchymal scarring is present.     Degenerative spondylosis is present with uncovertebral osteophytes. Foraminal  stenosis is present at the C5-C6 and C6-C7 levels.     IMPRESSION     1. No acute findings in the cervical spine.     2. Foraminal stenosis and degenerative spondylosis at C5-C6 and C6-C7. [DF]   3369 CT head FINDINGS: A thin linear hyperdensity is present along the right temporal  convexity. This may be a focal area of dural thickening or a thin smear subdural  hematoma. There is no appreciable mass effect 1 the brain. .     A right frontal scalp hematoma is present.     There is no hydrocephalus , intra-axial mass. There are no displaced skull  fractures. The mastoid air cells and paranasal sinuses are clear where imaged.     IMPRESSION     1. Right frontal scalp hematoma.     2. Thin extra-axial structure along the right frontal convexity measuring  approximately 2 mm in thickness. This may be a very small subdural hematoma. [DF]   4872 Potassium(!): 2.6 [DF]      ED Course User Index  [DF] Octavio Barrera MD       Critical Care  Performed by: Octavio Barrera MD  Authorized by:  Octavio Barrera MD     Critical care provider statement:     Critical care time (minutes):  35    Critical care time was exclusive of:  Separately billable procedures and treating other patients    Critical care was necessary to treat or prevent imminent or life-threatening deterioration of the following conditions:  CNS failure or compromise and metabolic crisis    Critical care was time spent personally by me on the following activities:  Blood draw for specimens, development of treatment plan with patient or surrogate, discussions with primary provider, discussions with consultants, evaluation of patient's response to treatment, examination of patient, ordering and performing treatments and interventions, ordering and review of laboratory studies, ordering and review of radiographic studies, pulse oximetry, re-evaluation of patient's condition, review of old charts and obtaining history from patient or surrogate    I assumed direction of critical care for this patient from another provider in my specialty: yes    Comments:      Patient with subdural hematoma, hypokalemia    EKG    Date/Time: 3/15/2022 4:09 PM  Performed by: Polo Sharma MD  Authorized by: Polo Sharma MD     ECG reviewed by ED Physician in the absence of a cardiologist: yes    Rate:     ECG rate:  66    ECG rate assessment: normal    Rhythm:     Rhythm: sinus rhythm    QRS:     QRS axis:  Normal    QRS intervals:  Normal  Conduction:     Conduction: normal    ST segments:     ST segments:  Normal  T waves:     T waves: inverted      Inverted:  II and III                   Khalif Butts MD; 3/15/2022 @2:33 PM Voice dictation software was used during the making of this note. This software is not perfect and grammatical and other typographical errors may be present.   This note has not been proofread for errors.  ===================================================================

## 2022-03-16 ENCOUNTER — APPOINTMENT (OUTPATIENT)
Dept: CT IMAGING | Age: 72
DRG: 086 | End: 2022-03-16
Attending: FAMILY MEDICINE
Payer: MEDICARE

## 2022-03-16 LAB
ANION GAP SERPL CALC-SCNC: 5 MMOL/L (ref 7–16)
BUN SERPL-MCNC: 12 MG/DL (ref 8–23)
CALCIUM SERPL-MCNC: 9.3 MG/DL (ref 8.3–10.4)
CHLORIDE SERPL-SCNC: 105 MMOL/L (ref 98–107)
CO2 SERPL-SCNC: 27 MMOL/L (ref 21–32)
CREAT SERPL-MCNC: 0.7 MG/DL (ref 0.6–1)
ERYTHROCYTE [DISTWIDTH] IN BLOOD BY AUTOMATED COUNT: 15.6 % (ref 11.9–14.6)
GLUCOSE SERPL-MCNC: 117 MG/DL (ref 65–100)
HCT VFR BLD AUTO: 30.6 % (ref 35.8–46.3)
HGB BLD-MCNC: 9.4 G/DL (ref 11.7–15.4)
MAGNESIUM SERPL-MCNC: 2.6 MG/DL (ref 1.8–2.4)
MCH RBC QN AUTO: 30.6 PG (ref 26.1–32.9)
MCHC RBC AUTO-ENTMCNC: 30.7 G/DL (ref 31.4–35)
MCV RBC AUTO: 99.7 FL (ref 79.6–97.8)
NRBC # BLD: 0 K/UL (ref 0–0.2)
PLATELET # BLD AUTO: 238 K/UL (ref 150–450)
PMV BLD AUTO: 9.9 FL (ref 9.4–12.3)
POTASSIUM SERPL-SCNC: 5 MMOL/L (ref 3.5–5.1)
RBC # BLD AUTO: 3.07 M/UL (ref 4.05–5.2)
SODIUM SERPL-SCNC: 137 MMOL/L (ref 136–145)
TSH SERPL DL<=0.005 MIU/L-ACNC: 0.02 UIU/ML (ref 0.36–3.74)
WBC # BLD AUTO: 5.9 K/UL (ref 4.3–11.1)

## 2022-03-16 PROCEDURE — 77010033678 HC OXYGEN DAILY

## 2022-03-16 PROCEDURE — 97112 NEUROMUSCULAR REEDUCATION: CPT

## 2022-03-16 PROCEDURE — 97165 OT EVAL LOW COMPLEX 30 MIN: CPT

## 2022-03-16 PROCEDURE — 97161 PT EVAL LOW COMPLEX 20 MIN: CPT

## 2022-03-16 PROCEDURE — 74011636637 HC RX REV CODE- 636/637: Performed by: FAMILY MEDICINE

## 2022-03-16 PROCEDURE — 97530 THERAPEUTIC ACTIVITIES: CPT

## 2022-03-16 PROCEDURE — 3331090001 HH PPS REVENUE CREDIT

## 2022-03-16 PROCEDURE — 80048 BASIC METABOLIC PNL TOTAL CA: CPT

## 2022-03-16 PROCEDURE — 77030040361 HC SLV COMPR DVT MDII -B

## 2022-03-16 PROCEDURE — 2709999900 HC NON-CHARGEABLE SUPPLY

## 2022-03-16 PROCEDURE — 77030040393 HC DRSG OPTIFOAM GENT MDII -B

## 2022-03-16 PROCEDURE — 3331090002 HH PPS REVENUE DEBIT

## 2022-03-16 PROCEDURE — 99221 1ST HOSP IP/OBS SF/LOW 40: CPT | Performed by: PHYSICAL MEDICINE & REHABILITATION

## 2022-03-16 PROCEDURE — 94640 AIRWAY INHALATION TREATMENT: CPT

## 2022-03-16 PROCEDURE — 97535 SELF CARE MNGMENT TRAINING: CPT

## 2022-03-16 PROCEDURE — 85027 COMPLETE CBC AUTOMATED: CPT

## 2022-03-16 PROCEDURE — 70450 CT HEAD/BRAIN W/O DYE: CPT

## 2022-03-16 PROCEDURE — 36415 COLL VENOUS BLD VENIPUNCTURE: CPT

## 2022-03-16 PROCEDURE — 83735 ASSAY OF MAGNESIUM: CPT

## 2022-03-16 PROCEDURE — 65660000000 HC RM CCU STEPDOWN

## 2022-03-16 PROCEDURE — 74011250637 HC RX REV CODE- 250/637: Performed by: FAMILY MEDICINE

## 2022-03-16 PROCEDURE — 99223 1ST HOSP IP/OBS HIGH 75: CPT | Performed by: INTERNAL MEDICINE

## 2022-03-16 PROCEDURE — 84443 ASSAY THYROID STIM HORMONE: CPT

## 2022-03-16 PROCEDURE — 92610 EVALUATE SWALLOWING FUNCTION: CPT

## 2022-03-16 RX ADMIN — POTASSIUM CHLORIDE 40 MEQ: 20 TABLET, EXTENDED RELEASE ORAL at 06:21

## 2022-03-16 RX ADMIN — LORAZEPAM 0.5 MG: 1 TABLET ORAL at 20:05

## 2022-03-16 RX ADMIN — PANTOPRAZOLE SODIUM 40 MG: 40 TABLET, DELAYED RELEASE ORAL at 16:12

## 2022-03-16 RX ADMIN — OXYCODONE 5 MG: 5 TABLET ORAL at 09:39

## 2022-03-16 RX ADMIN — AZITHROMYCIN 500 MG: 250 TABLET, FILM COATED ORAL at 20:05

## 2022-03-16 RX ADMIN — METOPROLOL SUCCINATE 100 MG: 25 TABLET, EXTENDED RELEASE ORAL at 08:36

## 2022-03-16 RX ADMIN — PREDNISONE 15 MG: 5 TABLET ORAL at 08:36

## 2022-03-16 RX ADMIN — GABAPENTIN 100 MG: 100 CAPSULE ORAL at 21:43

## 2022-03-16 RX ADMIN — DILTIAZEM HYDROCHLORIDE 30 MG: 30 TABLET, FILM COATED ORAL at 00:21

## 2022-03-16 RX ADMIN — OXYCODONE 5 MG: 5 TABLET ORAL at 05:45

## 2022-03-16 RX ADMIN — DILTIAZEM HYDROCHLORIDE 30 MG: 30 TABLET, FILM COATED ORAL at 23:52

## 2022-03-16 RX ADMIN — EZETIMIBE 10 MG: 10 TABLET ORAL at 08:36

## 2022-03-16 RX ADMIN — METHIMAZOLE 10 MG: 10 TABLET ORAL at 08:36

## 2022-03-16 RX ADMIN — OXYCODONE 5 MG: 5 TABLET ORAL at 19:34

## 2022-03-16 RX ADMIN — BUDESONIDE AND FORMOTEROL FUMARATE DIHYDRATE 2 PUFF: 160; 4.5 AEROSOL RESPIRATORY (INHALATION) at 08:40

## 2022-03-16 RX ADMIN — ESCITALOPRAM OXALATE 10 MG: 10 TABLET ORAL at 08:36

## 2022-03-16 RX ADMIN — PANTOPRAZOLE SODIUM 40 MG: 40 TABLET, DELAYED RELEASE ORAL at 08:37

## 2022-03-16 RX ADMIN — BUDESONIDE AND FORMOTEROL FUMARATE DIHYDRATE 2 PUFF: 160; 4.5 AEROSOL RESPIRATORY (INHALATION) at 19:20

## 2022-03-16 RX ADMIN — DILTIAZEM HYDROCHLORIDE 30 MG: 30 TABLET, FILM COATED ORAL at 06:21

## 2022-03-16 RX ADMIN — GABAPENTIN 100 MG: 100 CAPSULE ORAL at 08:36

## 2022-03-16 RX ADMIN — POTASSIUM CHLORIDE 40 MEQ: 20 TABLET, EXTENDED RELEASE ORAL at 00:21

## 2022-03-16 RX ADMIN — OXYCODONE 5 MG: 5 TABLET ORAL at 23:52

## 2022-03-16 RX ADMIN — DILTIAZEM HYDROCHLORIDE 30 MG: 30 TABLET, FILM COATED ORAL at 17:29

## 2022-03-16 RX ADMIN — OXYCODONE 5 MG: 5 TABLET ORAL at 14:53

## 2022-03-16 RX ADMIN — ATORVASTATIN CALCIUM 80 MG: 80 TABLET, FILM COATED ORAL at 21:43

## 2022-03-16 RX ADMIN — TIOTROPIUM BROMIDE INHALATION SPRAY 2 PUFF: 3.12 SPRAY, METERED RESPIRATORY (INHALATION) at 08:40

## 2022-03-16 RX ADMIN — DILTIAZEM HYDROCHLORIDE 30 MG: 30 TABLET, FILM COATED ORAL at 12:29

## 2022-03-16 RX ADMIN — POTASSIUM CHLORIDE 40 MEQ: 20 TABLET, EXTENDED RELEASE ORAL at 12:29

## 2022-03-16 NOTE — PROGRESS NOTES
Bedside and VERBAL SHIFT change report given to ALINE ALMONTE (oncoming nurse) by Lorna Prieto RN (offgoing nurse). Report included the following information SBAR, Kardex, ED Summary, OR Summary, Procedure Summary, Intake/Output, MAR, Cardiac Rhythm NSR and Dual Neuro Assessment.

## 2022-03-16 NOTE — PROGRESS NOTES
completed initial visit with patient.  was present and supportive. Patient and  expressed positive affect and positive outlook.  provided pastoral presence, prayer and empathetic listening. Signed by  Diego Woodward M.Div.

## 2022-03-16 NOTE — CONSULTS
Stefany Stevens MD  Medical Director  [address] [place] [address], [address] [facility]  Tel: [phone]       Physical Medicine & Rehabilitation Consult    Subjective:     Date of Consultation:  March 16, 2022  Referring Provider: Dr Lorri Mcdaniel is a 66 y.o. female who is being evaluated at the request of the hospitalist service for consideration for inpatient rehabilitation following admission with a small SDH.     HPI: The patient is a r-hand dominant, previously modified indep 66yo female known to me from [facility] stay in Feb s/p hip fx. She has a PMH of severe O2 dependent COPD,CAD status post PCI, chronic respiratory failure with hypoxia, centrilobular emphysema, hypertension, dependent at 1 L at night, pleurisy, thyroid disease, anxiety, arthritis, GERD, fall status post right femur fracture February 2022 and right carotid stenosis status post atherectomy in 2019.  Patient had a fall and presents to the emergency department with a right scalp hematoma.  The fall was reported as mechanical hitting the right side of the head and right hip.  Patient states she fell while on oxygen and tripped over tubing. CT of the head without contrast showed a small left temporal convexity with a subdural hematoma in the right without any evidence of brain compression or midline shift.  Right hip shows repaired subacute right hip fracture and a medial displacement of the lesser trochanter.  Neurosurgery was consulted with no acute intervention necessary with recommendation of repeat head CTs without contrast and to hold all anticoagulant antiplatelets.  Patient denied any neuro changes at that time.  Repeat CT of the head this morning showed no change from previous CT.    Cardiology was consulted due to being on Plavix and ASA s/p recent NSTEM with complicated stenting to the LAD.  They recommended cont ASA and only holding Plavix for less than 3-4d.   F/u Head CT; Unchanged thin right subdural hematoma, with no mass effect or  midline shift. Pt receiving K for hypokalemia with a K of 2. 6.   hgb 9.8 WBC 11.3, creat . 9    Principal Problem:    Post-traumatic subdural hematoma ([address] (3/15/2022)    Active Problems:    COPD, very severe ([address] (8/20/2013)      CAD S/P percutaneous coronary angioplasty (5/31/2019)      Chronic respiratory failure with hypoxia ([address] (6/8/2019)      Centrilobular emphysema ([address] (12/20/2019)      DNR (do not resuscitate) (11/9/2021)      Overview: POST form completed - DNR but full treatment, including intubation. No       TRACH. No PEG.        Closed right hip fracture ([address] (2/4/2022)      Hypokalemic alkalosis (3/15/2022)      Past Medical History:   Diagnosis Date    Arrhythmia     Arthritis     CAD (coronary artery disease) 2009, 8/19/2013    PCI,  Abisai Chou     Carotid stenosis, right     endarterectomy 11/25/19    Chronic anxiety 4/27/2017    COPD     recent referral to [address] for lung transplant    Depression 5/6/2021    Emphysema lung ([address]     GERD (gastroesophageal reflux disease)     controlled with medication    History of echocardiogram 06/07/2019    History of echocardiogram     echo 06/07/19 LVEF 55-60%    Hypertension     Nausea & vomiting     Oxygen dependent     1 liter at night    Pleurisy     Thyroid disease     pt denies      Past Surgical History:   Procedure Laterality Date    HX GYN      rebuilt cervix    HX HEART CATHETERIZATION  06/06/2019    last stent- per patient- 10 stents total    HX OTHER SURGICAL      HX TONSIL AND ADENOIDECTOMY      VASCULAR SURGERY PROCEDURE UNLIST Right 11/25/2019    carotid endarterectomy      Family History   Problem Relation Age of Onset    No Known Problems Mother         adopted      Social History     Tobacco Use    Smoking status: Former Smoker     Packs/day: 0.75     Years: 53.00     Pack years: 39.75     Types: Cigarettes     Quit date: 2018     Years since quittin.2    Smokeless tobacco: Never Used   Substance Use Topics    Alcohol use: Yes     Comment: rarely     Prior to Admission medications    Medication Sig Start Date End Date Taking? Authorizing Provider   azithromycin (ZITHROMAX) 500 mg tab Take 1 Tablet by mouth every Monday, Wednesday, Friday. 3/9/22   David Montero MD   LORazepam (ATIVAN) 0.5 mg tablet Take 1 Tablet by mouth every eight (8) hours as needed for Anxiety or Agitation. Max Daily Amount: 1.5 mg. PCP will prescribe this medication if it is necessary to continue. 22   Jonas Post MD   magnesium oxide (MAG-OX) 400 mg tablet Take 1 Tablet by mouth daily for 20 doses. 3/1/22 3/21/22  Jonas Post MD   metoprolol succinate (TOPROL-XL) 100 mg tablet Take 1 Tablet by mouth daily. 22   Jonas Post MD   acetaminophen (TYLENOL) 500 mg tablet Take 1,000 mg by mouth every eight (8) hours as needed for Pain. Provider, Historical   predniSONE (DELTASONE) 5 mg tablet Take 15 mg by mouth daily. Provider, Historical   nystatin (MYCOSTATIN) 100,000 unit/mL suspension Take 5 mL by mouth four (4) times daily. swish and spit  Indications: thrush  Patient not taking: Reported on 2022   REKHA Beauchamp   methIMAzole (TAPAZOLE) 10 mg tablet Take 1 Tablet by mouth daily. PCP will continue to prescribe this medication. Indications: overactive thyroid gland 22   REKHA Christiansen   guaiFENesin ER (MUCINEX) 1,200 mg Ta12 ER tablet Take 1 Tablet by mouth every twelve (12) hours. Pulmonologist or PCP will continue to prescribe this medication. 22   REKHA Christiansen   acetaminophen (TYLENOL) 325 mg tablet Take 2 Tablets by mouth every eight (8) hours. Indications: for hip surgery pain  Patient not taking: Reported on 2022   REKHA Christiansen   escitalopram oxalate (LEXAPRO) 10 mg tablet Take 1 Tablet by mouth daily. PCP will continue to prescribe this medication.   Indications: anxiousness associated with depression 2/25/22   REKHA Bro   lidocaine 4 % patch Apply patch to affected area for 12 hours a day and remove for 12 hours a day. Patient not taking: Reported on 2/26/2022 2/24/22   REKHA Bro   polyethylene glycol (Miralax) 17 gram/dose powder Take 17 g by mouth daily. Indications: constipation  Patient not taking: Reported on 2/26/2022 2/24/22   REKHA Bro   senna-docusate (PERICOLACE) 8.6-50 mg per tablet Take 2 Tablets by mouth daily. Indications: constipation  Patient not taking: Reported on 2/26/2022 2/25/22   REKHA Bro   traZODone (DESYREL) 50 mg tablet Take 0.5 Tablets by mouth nightly as needed for Sleep. PCP will prescribe this medication if it is necessary to continue. Indications: insomnia associated with depression 2/24/22   REKHA Bro   nitroglycerin (NITROSTAT) 0.4 mg SL tablet 1 Tablet by SubLINGual route three (3) times daily as needed for Chest Pain. Up to 3 doses. Patient taking differently: 1 Tablet by SubLINGual route three (3) times daily as needed for Chest Pain. Up to 3 doses for chest pain. then call 911 2/4/22   REKHA Smith   roflumilast (Daliresp) 500 mcg tab tablet Take 1 Tablet by mouth daily. 11/30/21   Arlyn Loo NP   ezetimibe (ZETIA) 10 mg tablet Take 1 Tablet by mouth daily. 10/7/21   Mary Avendaño MD   budesonide-formoteroL (Symbicort) 160-4.5 mcg/actuation HFAA Take 2 Puffs by inhalation two (2) times a day. 10/7/21   Arlyn Loo NP   atorvastatin (LIPITOR) 80 mg tablet TAKE 1 TABLET BY MOUTH DAILY 9/27/21   Mary Avendaño MD   tiotropium (Spiriva with HandiHaler) 18 mcg inhalation capsule Take 1 Cap by inhalation daily. 2/24/21   Arlyn Loo NP   OTHER Handicap placard 1/25/21   Arlyn Loo NP   albuterol (PROVENTIL VENTOLIN) 2.5 mg /3 mL (0.083 %) nebu 3 mL by Nebulization route every six (6) hours as needed for Shortness of Breath.  ICD-10-J44.9, please bill to Med B 20   Isaac Guevara, AMANDA   pantoprazole (PROTONIX) 40 mg tablet TAKE 1 TABLET BY MOUTH TWICE DAILY 19   Renzo Banks MD   OXYGEN-AIR DELIVERY SYSTEMS 1 L/min by Nasal route continuous. Provider, Historical   cetirizine (ZYRTEC) 10 mg tablet Take 10 mg by mouth daily as needed for Allergies. Provider, Historical   multivitamin (ONE A DAY) tablet Take 1 Tablet by mouth daily. Provider, Historical   aspirin delayed-release 81 mg tablet Take 81 mg by mouth daily. Provider, Historical     Allergies   Allergen Reactions    Promethazine Nausea and Vomiting and Other (comments)     Severe vomiting         Review of Systems:  A comprehensive review of systems was negative except for: Constitutional: positive for fatigue  Ears, nose, mouth, throat, and face: positive for nasal congestion  Respiratory: positive for cough, wheezing, dyspnea on exertion or emphysema  Cardiovascular: positive for chest pressure/discomfort, fatigue, dyspnea on exertion  Musculoskeletal: positive for stiff joints, back pain and muscle weakness    Objective:     Vitals:  Blood pressure (!) 117/58, pulse 64, temperature 98.1 °F (36.7 °C), resp. rate 24, height 5' 8\" (1.727 m), weight 135 lb 11.2 oz (61.6 kg), SpO2 100 %. Temp (24hrs), Av.1 °F (36.7 °C), Min:97.7 °F (36.5 °C), Max:98.6 °F (37 °C)      Intake and Output:   1901 -  0700  In: 1156.3 [P.O.:200; I.V.:956.3]  Out: -     Physical Exam:  General:  Alert, oriented and mood affect appropriate   Lungs:   Clear to auscultation bilaterally. Heart:  Regular rate and rhythm, S1, S2 stable, no murmur, click, rub or gallop. Abdomen:   Soft, non-tender. Bowel sounds present. No masses,  No organomegaly. Genitourinary: defer   Neuro Muscular: Non focal. prox >distal weakness  Sensation intact  PERRLA EOMI, visual field full  No nystagmus. Skin:  No rashes, lesions, or signs/symptoms or infection.        Labs/Studies:  Recent Results (from the past 72 hour(s))   CBC WITH AUTOMATED DIFF    Collection Time: 03/15/22  3:02 PM   Result Value Ref Range    WBC 8.8 4.3 - 11.1 K/uL    RBC 3.47 (L) 4.05 - 5.2 M/uL    HGB 10.6 (L) 11.7 - 15.4 g/dL    HCT 33.6 (L) 35.8 - 46.3 %    MCV 96.8 79.6 - 97.8 FL    MCH 30.5 26.1 - 32.9 PG    MCHC 31.5 31.4 - 35.0 g/dL    RDW 15.5 (H) 11.9 - 14.6 %    PLATELET 969 343 - 571 K/uL    MPV 9.7 9.4 - 12.3 FL    ABSOLUTE NRBC 0.00 0.0 - 0.2 K/uL    DF AUTOMATED      NEUTROPHILS 89 (H) 43 - 78 %    LYMPHOCYTES 7 (L) 13 - 44 %    MONOCYTES 4 4.0 - 12.0 %    EOSINOPHILS 0 (L) 0.5 - 7.8 %    BASOPHILS 0 0.0 - 2.0 %    IMMATURE GRANULOCYTES 1 0.0 - 5.0 %    ABS. NEUTROPHILS 7.9 1.7 - 8.2 K/UL    ABS. LYMPHOCYTES 0.6 0.5 - 4.6 K/UL    ABS. MONOCYTES 0.3 0.1 - 1.3 K/UL    ABS. EOSINOPHILS 0.0 0.0 - 0.8 K/UL    ABS. BASOPHILS 0.0 0.0 - 0.2 K/UL    ABS. IMM. GRANS. 0.0 0.0 - 0.5 K/UL   METABOLIC PANEL, COMPREHENSIVE    Collection Time: 03/15/22  3:02 PM   Result Value Ref Range    Sodium 138 136 - 145 mmol/L    Potassium 2.6 (L) 3.5 - 5.1 mmol/L    Chloride 97 (L) 98 - 107 mmol/L    CO2 34 (H) 21 - 32 mmol/L    Anion gap 7 7 - 16 mmol/L    Glucose 112 (H) 65 - 100 mg/dL    BUN 13 8 - 23 MG/DL    Creatinine 0.90 0.6 - 1.0 MG/DL    GFR est AA >60 >60 ml/min/1.73m2    GFR est non-AA >60 >60 ml/min/1.73m2    Calcium 9.4 8.3 - 10.4 MG/DL    Bilirubin, total 0.6 0.2 - 1.1 MG/DL    ALT (SGPT) 22 12 - 65 U/L    AST (SGOT) 18 15 - 37 U/L    Alk.  phosphatase 136 50 - 136 U/L    Protein, total 7.4 6.3 - 8.2 g/dL    Albumin 3.5 3.2 - 4.6 g/dL    Globulin 3.9 (H) 2.3 - 3.5 g/dL    A-G Ratio 0.9 (L) 1.2 - 3.5     PROTHROMBIN TIME + INR    Collection Time: 03/15/22  3:02 PM   Result Value Ref Range    Prothrombin time 13.0 12.6 - 14.5 sec    INR 1.0     PTT    Collection Time: 03/15/22  3:02 PM   Result Value Ref Range    aPTT 27.8 24.1 - 35.1 SEC   MAGNESIUM    Collection Time: 03/15/22  3:02 PM   Result Value Ref Range    Magnesium 1.8 1.8 - 2.4 mg/dL   CBC W/O DIFF    Collection Time: 03/15/22  7:13 PM   Result Value Ref Range    WBC 11.3 (H) 4.3 - 11.1 K/uL    RBC 3.23 (L) 4.05 - 5.2 M/uL    HGB 9.9 (L) 11.7 - 15.4 g/dL    HCT 31.5 (L) 35.8 - 46.3 %    MCV 97.5 79.6 - 97.8 FL    MCH 30.7 26.1 - 32.9 PG    MCHC 31.4 31.4 - 35.0 g/dL    RDW 15.4 (H) 11.9 - 14.6 %    PLATELET 314 806 - 325 K/uL    MPV 9.4 9.4 - 12.3 FL    ABSOLUTE NRBC 0.00 0.0 - 0.2 K/uL         Functional Assessment:  Functional Assessment  Baseline Functional Status: Ambulated with assistive devices  Fall in Past 12 Months: Yes  Fall With Injury: Yes (Describe)  Number of Falls Within 12 Months:  (2)  Approximate Date of Fall:  (03/15/2022)  Decline in Gait/Transfer/Balance: Yes (comment)  Developmental Delay: No  Chewing/Swallowing Problems: No  Difficulty with Secretions: No  Speech Slurred/Thick/Garbled: No     Jeffrey Score:        Speech Assessment:  Dysphagia Screening  Vocal Quality/Secretions: Normal  History of Dysphagia: No  O2 Saturation: Normal  Alertness: Normal  Pre-Swallow Assessment Score: 0  Purees: No difficulty noted  Water by Cup: No difficulty noted  Water by Straw: No difficulty noted                Ambulation:  Activity and Safety  Activity Level: Bed Rest  Ambulate: No (Comment)  Lucy's Egress Test: Pass  Activity: In bed,Watching t.v.  Activity Assistance: Partial (one person)  Weight Bearing Status: WBAT (Weight Bearing as Tolerated)  Mode of Transportation: Stretcher,Oxygen,IV equipment  Repositioned: Head of bed elevated (degrees)  Patient Turned: Up in Chair  Head of Bed Elevated: Self regulated  Activity Response:  Tolerated well  Assistive Device: Fall prevention device  Safety Measures: Bed/Chair alarm on,Bed/Chair-Wheels locked,Bed in low position,Call light within reach     Impression / Assessment:     Principal Problem:    Post-traumatic subdural hematoma (HCC) (3/15/2022)    Active Problems:    COPD, very severe ([address] (8/20/2013)      CAD S/P percutaneous coronary angioplasty (5/31/2019)      Chronic respiratory failure with hypoxia ([facility] (6/8/2019)      Centrilobular emphysema ([facility] (12/20/2019)      DNR (do not resuscitate) (11/9/2021)      Overview: POST form completed - DNR but full treatment, including intubation. No       TRACH. No PEG. Closed right hip fracture (HCC) (2/4/2022)      Hypokalemic alkalosis (3/15/2022)    Small traumatic SDH s/p recent right hip fx and NSTEMI s/p stent Feb 2022    Plan / Recommendations / Medical Decision Making:     Recommendations:   Continue Acute Rehab Program  Coordination of rehab / medical care  Counseling of PM&R care issues management  Monitoring and management of medical conditions per plan of care / orders  -will plan on a second [facility] admission . She if functioning below baseline. Was still receiving HH PT at time of this admission. I think she would benefit from some reconditioning. She has severe COPD, O2 dependent and rehab would be beneficial to inc endurance  -SDH; small. F/u CT stable  -CAD s/p stent; cont ASA, restart Plavix as soon as deemed safe to do so; defer to cardiology and NS  Discussion with Family / Caregiver / Staff    Reviewed Therapies / Abraham Tang / Patria Bey / Records      Thank you very much for this referral. We appreciate the opportunity to participate in this patient's care. We will continue to follow.

## 2022-03-16 NOTE — CONSULTS
Comprehensive Nutrition Assessment    Type and Reason for Visit: Initial,Consult  General nutrition management/ other reason Suppplements (Hospitalist)    Nutrition Recommendations/Plan:    Continue current diet. Meal preferences obtained. Malnutrition Assessment:  Malnutrition Status: At risk for malnutrition (specify) (poor PO intake inpt, low BMI for age)    Nutrition Assessment:   Nutrition History: Patient states she eats 2 meals at baseline and denies any changes to PO prior to intake. She states that her weight varies from ~128-148# due to fluid status. Nutrition Background: Patient with PMH significant for CAD, COPD, HTN, recent femur fracture (Feb 2022). She presented presented with hip pain and right scalp hematoma s/p fall. She was admitted to ICU. Nutrition Interval:  Patient seen sitting up in chair with lunch tray present. Observed and only 1 bite taken of dessert item. She states that she eats well when she is home, but typically does not eat well inpatient. She declines nutrition supplements stating that she does not like them. She asks if she could have a  salad with no ham, [place] dressing, crackers, and dessert for dinner. Already ordered in [address] when RD went to order. Nutrition Related Findings:   No lew wasting, thin appearing.       Current Nutrition Therapies:  ADULT DIET Regular    Current Intake:   Average Meal Intake: 1-25% Average Supplement Intake: None ordered      Anthropometric Measures:  Height: 5' 8\" (172.7 cm)  Current Body Wt: 61.6 kg (135 lb 12.9 oz) (3/15), Weight source: Bed scale  BMI: 20.7, Underweight (BMI less than 22) age over 72  Admission Body Weight: 135 lb 12.9 oz (3/15)  Ideal Body Weight (lbs) (Calculated): 140 lbs (64 kg), 97 %  Usual Body Wt:  (128-148# per pt, consistent with EMR), Percent weight change:            Edema: No data recorded   Estimated Daily Nutrient Needs:  Energy (kcal/day): 1379-1630 (Kcal/kg (25-30), Weight Used: Current (64.6 kg (3/15)))  Protein (g/day): 73-80 (1.2-1.3 g/kg) Weight Used: (Current)  Fluid (ml/day):   (1 ml/kcal)    Nutrition Diagnosis:   · Inadequate oral intake related to  (food preferences) as evidenced by  (reported barrier to PO in hospital)    Nutrition Interventions:   Food and/or Nutrient Delivery: Continue current diet     Coordination of Nutrition Care: Continue to monitor while inpatient    Goals:       Active Goal: Meet at least 50% nutrition needs by RD follow-up    Nutrition Monitoring and Evaluation:      Food/Nutrient Intake Outcomes: Food and nutrient intake  Physical Signs/Symptoms Outcomes: Fluid status or edema,Meal time behavior,Skin    Discharge Planning:    Continue current diet    [address], , LD on 3/16/2022 at 2:25 PM  Contact: [phone]

## 2022-03-16 NOTE — PROGRESS NOTES
SPEECH LANGUAGE PATHOLOGY: DYSPHAGIA  Initial Assessment    NAME/AGE/GENDER: Betty Woods is a 66 y.o. female  DATE: 3/16/2022  PRIMARY DIAGNOSIS: Subdural hemorrhage following injury ([address] [S06.5X9A]      ICD-10: Treatment Diagnosis: R13.12 Dysphagia, Oropharyngeal Phase    RECOMMENDATIONS   DIET:    Regular Consistency   Thin Liquids    MEDICATIONS: With liquid     ASPIRATION PRECAUTIONS  · Slow rate of intake  · Small bites/sips  · Upright at 90 degrees during meal     COMPENSATORY STRATEGIES/MODIFICATIONS  · None     EDUCATION:  · Recommendations discussed with Nursing  · Patient     RECOMMENDATIONS for CONTINUED SPEECH THERAPY: YES: Anticipate need for ongoing speech therapy for possible cognitive-linguistic evaluation pending clinicial course. .     ASSESSMENT   Patient presents with oropharyngeal swallow function that is within normal limits. No s/sx of dysphagia identified with thin liquids, puree, or solid textures. Recommend continue regular diet/thin liquids. Medications with liquid wash. No dysphagia treatment indicated. Patient's speech 100% intelligible at the conversational level. Appropriate word findings and command following with unstructured task. Good recall of recent and remote events. She denies any acute speech, language, or cognitive linguistic changes. Possible cognitive-linguistic evaluation pending clinical course. Do not anticipate need for ongoing speech therapy upon discharge. REHABILITATION POTENTIAL FOR STATED GOALS: Excellent    PLAN    FREQUENCY/DURATION: Possible cognitive-linguistic assessment pending clinical coarse and imaging results      SUBJECTIVE   Patient upright in bed eating AM meal. Cooperative.  A/O x4  History of Present Injury/Illness: Ms. Keyon Hunt  has a past medical history of Arrhythmia, Arthritis, CAD (coronary artery disease) (2009, 8/19/2013), Carotid stenosis, right, Chronic anxiety (4/27/2017), COPD, Depression (5/6/2021), Emphysema lung ([address] GERD (gastroesophageal reflux disease), History of echocardiogram (06/07/2019), History of echocardiogram, Hypertension, Nausea & vomiting, Oxygen dependent, Pleurisy, and Thyroid disease. She has no past medical history of Aneurysm ([address] Asthma, Autoimmune disease ([address] Cancer ([address] Chronic kidney disease, Chronic pain, Coagulation disorder ([address] Diabetes ([address] Endocarditis, Heart failure ([address] Ill-defined condition, Liver disease, Morbid obesity ([address] PUD (peptic ulcer disease), Rheumatic fever, Seizures ([address] Sleep apnea, Stroke ([facility] [address] [facility] or Thromboembolus ([address] . She also  has a past surgical history that includes hx tonsil and adenoidectomy; hx gyn; vascular surgery procedure unlist (Right, 11/25/2019); hx heart catheterization (06/06/2019); and hx other surgical.     Problem List:  (Impairments causing functional limitations):  1. Oropharyngeal dysphagia- No symptoms identified  2. Previous Dysphagia: Brief need for easy to chew diet during recent hospitalization. She was able to advance to regular diet  Diet Prior to Evaluation: Regular diet/thin liquids    Orientation:   Person  Place  Time  Situation    Pain: Pain Scale 1: Numeric (0 - 10)  Pain Intensity 1: 0  Pain Location 1: Head  Pain Intervention(s) 1: Medication (see MAR)    OBJECTIVE   Oral Motor:   · Labial: No impairment  · Dentition: Intact  · Oral Hygiene: Adequate  · Lingual: No impairment    Swallow evaluation:  Patient presented with thin liquid via cup and straw, puree, mixed, and solid consistencies. Appropriate oral prep with all textures. Timely swallow initiation, and single swallows upon palpation. Adequate oral clearing. No overt signs or symptoms of airway compromise observed with liquid or solid textures.        INTERDISCIPLINARY COLLABORATION: Registered Nurse  PRECAUTIONS/ALLERGIES: Promethazine     Tool Used: Dysphagia Outcome and Severity Scale (BENNY)    Score Comments   Normal Diet  [] 7 With no strategies or extra time needed   Functional Swallow  [] 6 May have mild oral or pharyngeal delay   Mild Dysphagia  [] 5 Which may require one diet consistency restricted    Mild-Moderate Dysphagia  [] 4 With 1-2 diet consistencies restricted   Moderate Dysphagia  [] 3 With 2 or more diet consistencies restricted   Moderate-Severe Dysphagia  [] 2 With partial PO strategies (trials with ST only)   Severe Dysphagia  [] 1 With inability to tolerate any PO safely      Score:  Initial: 7 Most Recent: x (Date 03/16/22 )   Interpretation of Tool: The Dysphagia Outcome and Severity Scale (BENNY) is a simple, easy-to-use, 7-point scale developed to systematically rate the functional severity of dysphagia based on objective assessment and make recommendations for diet level, independence level, and type of nutrition. Current Medications:   No current facility-administered medications on file prior to encounter. Current Outpatient Medications on File Prior to Encounter   Medication Sig Dispense Refill    azithromycin (ZITHROMAX) 500 mg tab Take 1 Tablet by mouth every Monday, Wednesday, Friday. 36 Tablet 3    LORazepam (ATIVAN) 0.5 mg tablet Take 1 Tablet by mouth every eight (8) hours as needed for Anxiety or Agitation. Max Daily Amount: 1.5 mg. PCP will prescribe this medication if it is necessary to continue. 20 Tablet 0    magnesium oxide (MAG-OX) 400 mg tablet Take 1 Tablet by mouth daily for 20 doses. 20 Tablet 0    metoprolol succinate (TOPROL-XL) 100 mg tablet Take 1 Tablet by mouth daily. 90 Tablet 0    acetaminophen (TYLENOL) 500 mg tablet Take 1,000 mg by mouth every eight (8) hours as needed for Pain.  predniSONE (DELTASONE) 5 mg tablet Take 15 mg by mouth daily.  nystatin (MYCOSTATIN) 100,000 unit/mL suspension Take 5 mL by mouth four (4) times daily.  swish and spit  Indications: thrush (Patient not taking: Reported on 2/26/2022) 473 mL 0    methIMAzole (TAPAZOLE) 10 mg tablet Take 1 Tablet by mouth daily. PCP will continue to prescribe this medication. Indications: overactive thyroid gland 30 Tablet 0    guaiFENesin ER (MUCINEX) 1,200 mg Ta12 ER tablet Take 1 Tablet by mouth every twelve (12) hours. Pulmonologist or PCP will continue to prescribe this medication. 60 Tablet 0    acetaminophen (TYLENOL) 325 mg tablet Take 2 Tablets by mouth every eight (8) hours. Indications: for hip surgery pain (Patient not taking: Reported on 2/26/2022) 40 Tablet prn    escitalopram oxalate (LEXAPRO) 10 mg tablet Take 1 Tablet by mouth daily. PCP will continue to prescribe this medication. Indications: anxiousness associated with depression 30 Tablet 1    lidocaine 4 % patch Apply patch to affected area for 12 hours a day and remove for 12 hours a day. (Patient not taking: Reported on 2/26/2022) 30 Patch 0    polyethylene glycol (Miralax) 17 gram/dose powder Take 17 g by mouth daily. Indications: constipation (Patient not taking: Reported on 2/26/2022) 289 g prn    senna-docusate (PERICOLACE) 8.6-50 mg per tablet Take 2 Tablets by mouth daily. Indications: constipation (Patient not taking: Reported on 2/26/2022) 60 Tablet prn    traZODone (DESYREL) 50 mg tablet Take 0.5 Tablets by mouth nightly as needed for Sleep. PCP will prescribe this medication if it is necessary to continue. Indications: insomnia associated with depression 15 Tablet 0    nitroglycerin (NITROSTAT) 0.4 mg SL tablet 1 Tablet by SubLINGual route three (3) times daily as needed for Chest Pain. Up to 3 doses. (Patient taking differently: 1 Tablet by SubLINGual route three (3) times daily as needed for Chest Pain. Up to 3 doses for chest pain. then call 911) 60 Each 11    roflumilast (Daliresp) 500 mcg tab tablet Take 1 Tablet by mouth daily. 90 Tablet 3    ezetimibe (ZETIA) 10 mg tablet Take 1 Tablet by mouth daily. 90 Tablet 3    budesonide-formoteroL (Symbicort) 160-4.5 mcg/actuation HFAA Take 2 Puffs by inhalation two (2) times a day.  3 Each 3    atorvastatin (LIPITOR) 80 mg tablet TAKE 1 TABLET BY MOUTH DAILY 90 Tablet 3    tiotropium (Spiriva with HandiHaler) 18 mcg inhalation capsule Take 1 Cap by inhalation daily. 90 Cap 3    OTHER Handicap placard 1 Each 0    albuterol (PROVENTIL VENTOLIN) 2.5 mg /3 mL (0.083 %) nebu 3 mL by Nebulization route every six (6) hours as needed for Shortness of Breath. ICD-10-J44.9, please bill to Med B 360 mL 11    pantoprazole (PROTONIX) 40 mg tablet TAKE 1 TABLET BY MOUTH TWICE DAILY 180 Tab 0    OXYGEN-AIR DELIVERY SYSTEMS 1 L/min by Nasal route continuous.  cetirizine (ZYRTEC) 10 mg tablet Take 10 mg by mouth daily as needed for Allergies.  multivitamin (ONE A DAY) tablet Take 1 Tablet by mouth daily.  aspirin delayed-release 81 mg tablet Take 81 mg by mouth daily.          SAFETY:  After treatment position/precautions:  · Upright in bed  · RN notified  · Call light within reach    Total Treatment Duration:   Time In: 0705  Time Out: [address] [place] [address] 43., CCC-SLP  Speech Language Pathologist  Acute Rehabilitation Services  Contact: Tee

## 2022-03-16 NOTE — PROGRESS NOTES
ACUTE OT GOALS:  (Developed with and agreed upon by patient and/or caregiver.)  1. Patient will complete lower body bathing and dressing with MOD I and adaptive equipment as needed. 2. Patient will complete toileting with MOD I.   3. Patient will tolerate 30 minutes of OT treatment with 1-2 rest breaks to increase activity tolerance for ADLs. 4. Patient will complete functional transfers with MOD I and adaptive equipment as needed. 5. Patient will complete functional mobility for household distances with MOD I and adaptive equipment as needed. 6. Patient will complete self-grooming while standing edge of sink with MOD I and adaptive equipment as needed. Timeframe: 7 visits         OCCUPATIONAL THERAPY ASSESSMENT: Initial Assessment and Daily Note OT Treatment Day # 1    Johanna Ferrer is a 66 y.o. female   PRIMARY DIAGNOSIS: Post-traumatic subdural hematoma (HCC)  Subdural hemorrhage following injury ([facility] [address] [S06.5X9A]       Reason for Referral:   ICD-10: Treatment Diagnosis: Generalized Muscle Weakness (M62.81)  [place] / [place] / Product Type: Medicare /   ASSESSMENT:     REHAB RECOMMENDATIONS:   Recommendation to date pending progress:  Setting:   Inpatient Rehab Facility  Equipment:    To Be Determined     PRIOR LEVEL OF FUNCTION:  (Prior to Hospitalization)  INITIAL/CURRENT LEVEL OF FUNCTION:  (Based on today's evaluation)   Bathing:   Independent  Dressing:   Independent  Feeding/Grooming:   Independent  Toileting:   Independent  Functional Mobility:   Modified Independent x RW Bathing:   Moderate Assistance  Dressing:   Moderate Assistance  Feeding/Grooming:   Standby Assistance  Toileting:   Minimal Assistance  Functional Mobility:   Minimal Assistance x RW     ASSESSMENT:  Ms. Jarod Ham presents with deficits in overall strength, activity tolerance, ADL performance and functional mobility.  Presents in ICU for subdural hematoma s/p fall in the home. Patient with multiple hospitalizations over the last few months with hip fracture requiring operative repair and elevated HR. Has since been home and doing well per patient report. BUE AROM and strength (4/5) are generally decreased but WFL; coordination and sensation are intact. No visual deficits noted today. Min A for functional bed mobility/transfers. Min A for sit <> stand; use of RW for mobility in room. Min A for bathroom mobility; SBA for toileting hygiene. Noted decrease in activity tolerance while performing functional tasks today with Multiple rest breaks provided during session. Cues for pacing and safety awareness. At this time, Александр Lange is functioning below baseline for ADLs and functional mobility. Pt would benefit from skilled OT services to address OT goals and and plan of care. .     SUBJECTIVE:   Ms. Amarilis Nunez states, \"I just can't get away from this place. .\"    SOCIAL HISTORY/LIVING ENVIRONMENT: Lives with friend in [facility] with 1 KADY. Independent at baseline; however had hip fracture requiring operative repair in February. Has since been home and doing well; using RW for mobility at home. Home Environment: Private residence  # Steps to Enter: 1  One/Two Story Residence: One story  Living Alone: No  Support Systems: Spouse/Significant Other    OBJECTIVE:     PAIN: VITAL SIGNS: LINES/DRAINS:   Pre Treatment: Pain Screen  Pain Scale 1: Numeric (0 - 10)  Pain Intensity 1: 0  Post Treatment: 0   IV  O2 Device: Nasal cannula     GROSS EVALUATION:  BUEs Within Functional Limits Abnormal/ Functional Abnormal/ Non-Functional (see comments) Not Tested Comments:   AROM [] [x] [] []    PROM [] [] [] []    Strength [] [x] [] [] Generally weak    Balance [] [x] [] [] Intact sitting balance. Fair (+) standing balance with use of RW.    Posture [x] [] [] []    Sensation [x] [] [] []    Coordination [x] [] [] []    Tone [x] [] [] []    Edema [x] [] [] []    Activity Tolerance [] [x] [] [] Deconditioned; resting on 2L     [] [] [] []      COGNITION/  PERCEPTION: Intact Impaired   (see comments) Comments:   Orientation [x] []    Vision [x] []    Hearing [x] []    Judgment/ Insight [x] []    Attention [x] []    Memory [x] []    Command Following [x] []    Emotional Regulation [x] []     [] []      ACTIVITIES OF DAILY LIVING: I Mod I S SBA CGA Min Mod Max Total NT Comments   BASIC ADLs:              Bathing/ Showering [] [] [] [] [] [] [] [] [] [x]    Toileting [] [] [] [x] [] [] [] [] [] []    Dressing [] [] [] [] [] [] [] [] [] [x]    Feeding [] [] [] [] [] [] [] [] [] [x]    Grooming [] [] [] [x] [] [] [] [] [] []    Personal Device Care [] [] [] [] [] [] [] [] [] [x]    Functional Mobility [] [] [] [] [] [x] [] [] [] [] X RW   I=Independent, Mod I=Modified Independent, S=Supervision, SBA=Standby Assistance, CGA=Contact Guard Assistance,   Min=Minimal Assistance, Mod=Moderate Assistance, Max=Maximal Assistance, Total=Total Assistance, NT=Not Tested    MOBILITY: I Mod I S SBA CGA Min Mod Max Total  NT x2 Comments:   Supine to sit [] [] [] [] [] [x] [] [] [] [] []    Sit to supine [] [] [] [] [] [] [] [] [] [x] []    Sit to stand [] [] [] [] [] [x] [] [] [] [] []    Bed to chair [] [] [] [] [] [x] [] [] [] [] [] X RW   I=Independent, Mod I=Modified Independent, S=Supervision, SBA=Standby Assistance, CGA=Contact Guard Assistance,   Min=Minimal Assistance, Mod=Moderate Assistance, Max=Maximal Assistance, Total=Total Assistance, NT=Not [facility] 6 Clicks   Daily Activity Inpatient Short Form        How much help from another person does the patient currently need. .. Total A Lot A Little None   1. Putting on and taking off regular lower body clothing? [] 1   [x] 2   [] 3   [] 4   2. Bathing (including washing, rinsing, drying)? [] 1   [x] 2   [] 3   [] 4   3. Toileting, which includes using toilet, bedpan or urinal?   [] 1   [] 2   [x] 3   [] 4   4. Putting on and taking off regular upper body clothing? [] 1   [] 2   [x] 3   [] 4   5. Taking care of personal grooming such as brushing teeth? [] 1   [] 2   [x] 3   [] 4   6. Eating meals? [] 1   [] 2   [x] 3   [] 4   © 2007, Trustees of [address] [place] [address] [address] [id number], under license to [employer]. All rights reserved     Score:  Initial: 16 Most Recent: X (Date: -- )   Interpretation of Tool:  Represents activities that are increasingly more difficult (i.e. Bed mobility, Transfers, Gait). PLAN:   FREQUENCY/DURATION: OT Plan of Care: 3 times/week for duration of hospital stay or until stated goals are met, whichever comes first.    PROBLEM LIST:   (Skilled intervention is medically necessary to address:)  1. Decreased ADL/Functional Activities  2. Decreased Activity Tolerance  3. Decreased AROM/PROM  4. Decreased Balance  5. Decreased Coordination  6. Decreased Gait Ability  7. Decreased Strength  8. Decreased Transfer Abilities   INTERVENTIONS PLANNED:   (Benefits and precautions of occupational therapy have been discussed with the patient.)  1. Self Care Training  2. Therapeutic Activity  3. Therapeutic Exercise/HEP  4. Neuromuscular Re-education  5. Education     TREATMENT:     EVALUATION: Low Complexity : (Untimed Charge)    TREATMENT:   ($$ Self Care/Home Management: 8-22 mins$$ Neuromuscular Re-Education: 8-22 mins   )  Self Care (15 Minutes): Self care including Toileting, Grooming and Energy Conservation Training to increase independence and decrease level of assistance required. Neuromuscular Re-education (10 Minutes): Neuromuscular Re-education included Balance Training, Coordination training, Postural training, Sitting balance training and Standing balance training to improve Balance, Coordination and Postural Control.     TREATMENT GRID:  N/A    AFTER TREATMENT POSITION/PRECAUTIONS:  Chair, Needs within reach, RN notified and Visitors at bedside    INTERDISCIPLINARY COLLABORATION:  RN/PCT, PT/PTA and OT/YOUNG    TOTAL TREATMENT DURATION:  OT Patient Time In/Time Out  Time In: 1008  Time Out: [address],

## 2022-03-16 NOTE — PROGRESS NOTES
Bedside, Verbal and Written shift change report given to Naa Walton [facility] (oncoming nurse) by Vianey Palm RN (offgoing nurse). Report included the following information SBAR, Kardex, Intake/Output, MAR, Accordion, Recent Results, Cardiac Rhythm NSR, Alarm Parameters  and Dual Neuro Assessment. Dual neuro assessment performed, NIH of zero. NS running at 75. Dual skin assessment performed, no pressure injury noted.

## 2022-03-16 NOTE — H&P
[facility] Cardiology Initial Cardiac Evaluation                                                             Date of  Admission: 3/15/2022  2:01 PM             PCP: Rishi Jamison MD  Requested by: Dr Ashkan Ham  Primary Cardiologist: Dr Brennen Kay Attending: Dr Marco A Herring     Reason for Evaluation: On DAPT        Rivera Joe is a 66 y.o. female with hx of COPD, CAD status post PCI, chronic respiratory failure with hypoxia, centrilobular emphysema, hypertension, dependent at 1 L at night, pleurisy, thyroid disease, anxiety, arthritis, GERD, fall status post right femur fracture February 2022 and right carotid stenosis status post atherectomy in 2019. Patient had a fall and presents to the emergency department with a right scalp hematoma. The fall was reported as mechanical hitting the right side of the head and right hip. Patient states she fell while on oxygen and tripped over tubing. Patient last had a PCI on 2/7/2022 for non-STEMI requiring multiple stents after a fall with a femur fracture requiring repair. Patient had a repeat admission 02/26/2022 for chest pain and was changed from Effient to Plavix at discharge. CT of the head without contrast showed a small left temporal convexity with a subdural hematoma in the right without any evidence of brain compression or midline shift. Right hip shows repaired subacute right hip fracture and a medial displacement of the lesser trochanter. Neurosurgery was consulted with no acute intervention necessary with recommendation of repeat head CTs without contrast and to hold all anticoagulant antiplatelets. Patient denied any neuro changes at that time. Repeat CT of the head this morning showed no change from previous CT. Recent Cardiac Synopsis  LHC/NST: 2/7/2022 1. Normal left ventricular systolic function  2. Coronary artery disease as described below  3. Successful two-vessel complex PCI is performed as described above.   A good result was obtained on the large LAD diagonal with 2 Synergy stents and a high-pressure 2.5/3.0 culotte postdilatation. .  The distal right coronary artery was successfully treated with 2.5 mm POBA. Echo: 2/6/2022   Left Ventricle: Left ventricle size is normal. Normal wall thickness. Normal wall motion. Low normal left ventricular systolic function. EF by 2D Simpsons Biplane is 52%. Normal diastolic function. Aortic Valve: Mildly thickened cusps. Mitral Valve: Mildly thickened leaflets. Mildly calcified anterior leaflet. Moderate to severe transvalvular regurgitation with an eccentrically directed jet and may underestimate severity. Transesophageal echo  recommended for further evaluation of potentially severe mitral regurgitation. Aorta: Dilated annulus.      EKG: NSR with new T wave changes with inversions in the lateral Leads             Past Medical History:   Diagnosis Date    Arrhythmia      Arthritis      CAD (coronary artery disease) 2009, 8/19/2013     PCI,  Bianca Townsend     Carotid stenosis, right       endarterectomy 11/25/19    Chronic anxiety 4/27/2017    COPD       recent referral to [facility] Dr Uli Zayas for lung transplant    Depression 5/6/2021    Emphysema lung ([address]      GERD (gastroesophageal reflux disease)       controlled with medication    History of echocardiogram 06/07/2019    History of echocardiogram       echo 06/07/19 LVEF 55-60%    Hypertension      Nausea & vomiting      Oxygen dependent       1 liter at night    Pleurisy      Thyroid disease       pt denies            Past Surgical History:   Procedure Laterality Date    HX GYN         rebuilt cervix    HX HEART CATHETERIZATION   06/06/2019     last stent- per patient- 10 stents total    HX OTHER SURGICAL        HX TONSIL AND ADENOIDECTOMY        VASCULAR SURGERY PROCEDURE UNLIST Right 11/25/2019     carotid endarterectomy            Allergies   Allergen Reactions    Promethazine Nausea and Vomiting and Other (comments)       Severe vomiting Family History   Problem Relation Age of Onset    No Known Problems Mother           adopted      Social History   Social History            Socioeconomic History    Marital status:        Spouse name: Not on file    Number of children: Not on file    Years of education: Not on file    Highest education level: Not on file   Occupational History    Occupation: [employer]       Employer: SELF EMPLOYED       Comment: home   Tobacco Use    Smoking status: Former Smoker       Packs/day: 0.75       Years: 53.00       Pack years: 39.75       Types: Cigarettes       Quit date:        Years since quittin.2    Smokeless tobacco: Never Used   Substance and Sexual Activity    Alcohol use: Yes       Comment: rarely    Drug use: No    Sexual activity: Not on file   Other Topics Concern     Service Not Asked    Blood Transfusions Not Asked    Caffeine Concern Not Asked    Occupational Exposure Not Asked    Hobby Hazards Not Asked    Sleep Concern Not Asked    Stress Concern Not Asked    Weight Concern Not Asked    Special Diet Not Asked    Back Care Not Asked    Exercise Not Asked    Bike Helmet Not Asked    Seat Belt Not Asked    Self-Exams Not Asked   Social History Narrative     , has 2 children, 1 step child. 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       Social Determinants of Health          Financial Resource Strain:     Difficulty of Paying Living Expenses: Not on file   Food Insecurity:     Worried About [address] in the Last Year: Not on file    Vishal of Food in the Last Year: Not on file   Transportation Needs:     Lack of Transportation (Medical): Not on file    Lack of Transportation (Non-Medical):  Not on file   Physical Activity:     Days of Exercise per Week: Not on file    Minutes of Exercise per Session: Not on file   Stress:     Feeling of Stress : Not on file   Social Connections:     Frequency of Communication with Friends and Family: Not on file Frequency of Social Gatherings with Friends and Family: Not on file    Attends Anglican Services: Not on file    Active Member of Clubs or Organizations: Not on file    Attends Club or Organization Meetings: Not on file    Marital Status: Not on file   Intimate Partner Violence:     Fear of Current or Ex-Partner: Not on file    Emotionally Abused: Not on file    Physically Abused: Not on file    Sexually Abused: Not on file   Housing Stability:     Unable to Pay for Housing in the Last Year: Not on file    Number of [place] in the Last Year: Not on file    Unstable Housing in the Last Year: Not on file            Prior to Admission Medications   Prescriptions Last Dose Informant Patient Reported? Taking? LORazepam (ATIVAN) 0.5 mg tablet     No No   Sig: Take 1 Tablet by mouth every eight (8) hours as needed for Anxiety or Agitation. Max Daily Amount: 1.5 mg. PCP will prescribe this medication if it is necessary to continue. OTHER     No No   Sig: Handicap placard   OXYGEN-AIR DELIVERY SYSTEMS     Yes No   Si L/min by Nasal route continuous. acetaminophen (TYLENOL) 325 mg tablet     No No   Sig: Take 2 Tablets by mouth every eight (8) hours. Indications: for hip surgery pain   Patient not taking: Reported on 2022   acetaminophen (TYLENOL) 500 mg tablet     Yes No   Sig: Take 1,000 mg by mouth every eight (8) hours as needed for Pain. albuterol (PROVENTIL VENTOLIN) 2.5 mg /3 mL (0.083 %) nebu     No No   Sig: 3 mL by Nebulization route every six (6) hours as needed for Shortness of Breath. ICD-10-J44.9, please bill to Med B   aspirin delayed-release 81 mg tablet     Yes No   Sig: Take 81 mg by mouth daily. atorvastatin (LIPITOR) 80 mg tablet     No No   Sig: TAKE 1 TABLET BY MOUTH DAILY   azithromycin (ZITHROMAX) 500 mg tab     No No   Sig: Take 1 Tablet by mouth every Monday, Wednesday, Friday.    budesonide-formoteroL (Symbicort) 160-4.5 mcg/actuation HFAA     No No   Sig: Take 2 Puffs by inhalation two (2) times a day. cetirizine (ZYRTEC) 10 mg tablet     Yes No   Sig: Take 10 mg by mouth daily as needed for Allergies. escitalopram oxalate (LEXAPRO) 10 mg tablet     No No   Sig: Take 1 Tablet by mouth daily. PCP will continue to prescribe this medication. Indications: anxiousness associated with depression   ezetimibe (ZETIA) 10 mg tablet     No No   Sig: Take 1 Tablet by mouth daily. guaiFENesin ER (MUCINEX) 1,200 mg Ta12 ER tablet     No No   Sig: Take 1 Tablet by mouth every twelve (12) hours. Pulmonologist or PCP will continue to prescribe this medication. lidocaine 4 % patch     No No   Sig: Apply patch to affected area for 12 hours a day and remove for 12 hours a day. Patient not taking: Reported on 2022   magnesium oxide (MAG-OX) 400 mg tablet     No No   Sig: Take 1 Tablet by mouth daily for 20 doses. methIMAzole (TAPAZOLE) 10 mg tablet     No No   Sig: Take 1 Tablet by mouth daily. PCP will continue to prescribe this medication. Indications: overactive thyroid gland   metoprolol succinate (TOPROL-XL) 100 mg tablet     No No   Sig: Take 1 Tablet by mouth daily. multivitamin (ONE A DAY) tablet     Yes No   Sig: Take 1 Tablet by mouth daily. nitroglycerin (NITROSTAT) 0.4 mg SL tablet     No No   Si Tablet by SubLINGual route three (3) times daily as needed for Chest Pain. Up to 3 doses. Patient taking differently: 1 Tablet by SubLINGual route three (3) times daily as needed for Chest Pain. Up to 3 doses for chest pain. then call 911   nystatin (MYCOSTATIN) 100,000 unit/mL suspension     No No   Sig: Take 5 mL by mouth four (4) times daily. swish and spit  Indications: thrush   Patient not taking: Reported on 2022   pantoprazole (PROTONIX) 40 mg tablet     No No   Sig: TAKE 1 TABLET BY MOUTH TWICE DAILY   polyethylene glycol (Miralax) 17 gram/dose powder     No No   Sig: Take 17 g by mouth daily.  Indications: constipation   Patient not taking: Reported on 2/26/2022   predniSONE (DELTASONE) 5 mg tablet     Yes No   Sig: Take 15 mg by mouth daily. roflumilast (Daliresp) 500 mcg tab tablet     No No   Sig: Take 1 Tablet by mouth daily. senna-docusate (PERICOLACE) 8.6-50 mg per tablet     No No   Sig: Take 2 Tablets by mouth daily. Indications: constipation   Patient not taking: Reported on 2/26/2022   tiotropium (Spiriva with HandiHaler) 18 mcg inhalation capsule     No No   Sig: Take 1 Cap by inhalation daily. traZODone (DESYREL) 50 mg tablet     No No   Sig: Take 0.5 Tablets by mouth nightly as needed for Sleep. PCP will prescribe this medication if it is necessary to continue.   Indications: insomnia associated with depression      Facility-Administered Medications: None                Current Facility-Administered Medications   Medication Dose Route Frequency    potassium chloride (K-DUR, KLOR-CON M20) SR tablet 40 mEq  40 mEq Oral Q6H    PHARMACY INFORMATION NOTE - Nursing IV Fluid Modifications Based On Serum Potassium  Lab Results  1 Each Other DAILY    ondansetron (ZOFRAN) injection 4 mg  4 mg IntraVENous Q6H PRN    acetaminophen (TYLENOL) tablet 650 mg  650 mg Oral Q4H PRN    acetaminophen (TYLENOL) suppository 650 mg  650 mg Rectal Q4H PRN    senna-docusate (PERICOLACE) 8.6-50 mg per tablet 2 Tablet  2 Tablet Oral QHS    bisacodyL (DULCOLAX) tablet 5 mg  5 mg Oral DAILY PRN    labetaloL (NORMODYNE;TRANDATE) injection 20 mg  20 mg IntraVENous Q4H PRN    0.9% sodium chloride infusion  75 mL/hr IntraVENous CONTINUOUS    LORazepam (ATIVAN) tablet 0.5 mg  0.5 mg Oral Q8H PRN    metoprolol succinate (TOPROL-XL) XL tablet 100 mg  100 mg Oral DAILY    polyethylene glycol (MIRALAX) packet 17 g  17 g Oral DAILY    nitroglycerin (NITROSTAT) tablet 0.4 mg  0.4 mg SubLINGual PRN    traZODone (DESYREL) tablet 50 mg  50 mg Oral QHS PRN    ezetimibe (ZETIA) tablet 10 mg  10 mg Oral DAILY    albuterol-ipratropium (DUO-NEB) 2.5 MG-0.5 MG/3 ML  3 mL Nebulization Q4H PRN tiotropium bromide (SPIRIVA RESPIMAT) 2.5 mcg /actuation  2 Puff Inhalation DAILY    budesonide-formoteroL (SYMBICORT) 160-4.5 mcg/actuation HFA inhaler 2 Puff  2 Puff Inhalation BID RT    atorvastatin (LIPITOR) tablet 80 mg  80 mg Oral QHS    escitalopram oxalate (LEXAPRO) tablet 10 mg  10 mg Oral DAILY    pantoprazole (PROTONIX) tablet 40 mg  40 mg Oral ACB&D    oxyCODONE IR (ROXICODONE) tablet 5 mg  5 mg Oral Q4H PRN    gabapentin (NEURONTIN) capsule 100 mg  100 mg Oral TID    dilTIAZem IR (CARDIZEM) tablet 30 mg  30 mg Oral Q6H    predniSONE (DELTASONE) tablet 15 mg  15 mg Oral DAILY WITH BREAKFAST    methIMAzole (TAPAZOLE) tablet 10 mg  10 mg Oral DAILY    azithromycin (ZITHROMAX) tablet 500 mg  500 mg Oral Q MON, WED & FRI    niCARdipine (CARDENE) 25 mg in 0.9% sodium chloride 250 mL ([id number])  5-15 mg/hr IntraVENous TITRATE         Review of Systems   Constitutional: Negative for weight gain and weight loss. HENT: Negative. Eyes: Negative. Cardiovascular: Negative for chest pain (currently pain free), claudication, cyanosis, dyspnea on exertion, irregular heartbeat, leg swelling, near-syncope, orthopnea, palpitations, paroxysmal nocturnal dyspnea and syncope. Respiratory: Negative for cough, shortness of breath and wheezing. Endocrine: Negative. Skin: Negative. Musculoskeletal: Positive for falls. Gastrointestinal: Negative for nausea and vomiting. Genitourinary: Negative. Neurological: Negative for dizziness. Psychiatric/Behavioral: Negative. Allergic/Immunologic: Negative.            Physical Exam         Vitals:     03/16/22 0621 03/16/22 0630 03/16/22 0645 03/16/22 0700   BP: (!) 108/54 (!) 103/51 (!) 113/57     Pulse: 68 66 72 66   Resp:   21 (!) 43 22   Temp:           SpO2:   98% 95% 96%   Weight:           Height:                     Last 3 Recorded Weights in this Encounter     03/15/22 1405   Weight: 61.6 kg (135 lb 11.2 oz)            Intake/Output Summary (Last 24 hours) at 3/16/2022 0840  Last data filed at 3/16/2022 0530      Gross per 24 hour   Intake 1156.25 ml   Output --   Net 1156.25 ml         Net IO Since Admission: 1,156.25 mL [03/16/22 0840]        Physical Exam:  General: Well Developed, Well Nourished, No Acute Distress  HEENT: pupils equal and round, no abnormalities noted  Neck: supple, no JVD, no carotid bruits  Heart: S1S2 with RRR without murmurs or gallops  Lungs: Clear throughout auscultation bilaterally without adventitious sounds  Abd: soft, nontender, nondistended, with good bowel sounds  Ext: warm, no edema, calves supple/nontender, pulses 2+ bilaterally  Skin: warm and dry  Psychiatric: Normal mood and affect  Neurologic: Alert and oriented X 3        Recent Results         Recent Results (from the past 24 hour(s))   CBC WITH AUTOMATED DIFF     Collection Time: 03/15/22  3:02 PM   Result Value Ref Range     WBC 8.8 4.3 - 11.1 K/uL     RBC 3.47 (L) 4.05 - 5.2 M/uL     HGB 10.6 (L) 11.7 - 15.4 g/dL     HCT 33.6 (L) 35.8 - 46.3 %     MCV 96.8 79.6 - 97.8 FL     MCH 30.5 26.1 - 32.9 PG     MCHC 31.5 31.4 - 35.0 g/dL     RDW 15.5 (H) 11.9 - 14.6 %     PLATELET 901 230 - 748 K/uL     MPV 9.7 9.4 - 12.3 FL     ABSOLUTE NRBC 0.00 0.0 - 0.2 K/uL     DF AUTOMATED       NEUTROPHILS 89 (H) 43 - 78 %     LYMPHOCYTES 7 (L) 13 - 44 %     MONOCYTES 4 4.0 - 12.0 %     EOSINOPHILS 0 (L) 0.5 - 7.8 %     BASOPHILS 0 0.0 - 2.0 %     IMMATURE GRANULOCYTES 1 0.0 - 5.0 %     ABS. NEUTROPHILS 7.9 1.7 - 8.2 K/UL     ABS. LYMPHOCYTES 0.6 0.5 - 4.6 K/UL     ABS. MONOCYTES 0.3 0.1 - 1.3 K/UL     ABS. EOSINOPHILS 0.0 0.0 - 0.8 K/UL     ABS. BASOPHILS 0.0 0.0 - 0.2 K/UL     ABS. IMM.  GRANS. 0.0 0.0 - 0.5 K/UL   METABOLIC PANEL, COMPREHENSIVE     Collection Time: 03/15/22  3:02 PM   Result Value Ref Range     Sodium 138 136 - 145 mmol/L     Potassium 2.6 (L) 3.5 - 5.1 mmol/L     Chloride 97 (L) 98 - 107 mmol/L     CO2 34 (H) 21 - 32 mmol/L     Anion gap 7 7 - 16 mmol/L     Glucose 112 (H) 65 - 100 mg/dL     BUN 13 8 - 23 MG/DL     Creatinine 0.90 0.6 - 1.0 MG/DL     GFR est AA >60 >60 ml/min/1.73m2     GFR est non-AA >60 >60 ml/min/1.73m2     Calcium 9.4 8.3 - 10.4 MG/DL     Bilirubin, total 0.6 0.2 - 1.1 MG/DL     ALT (SGPT) 22 12 - 65 U/L     AST (SGOT) 18 15 - 37 U/L     Alk. phosphatase 136 50 - 136 U/L     Protein, total 7.4 6.3 - 8.2 g/dL     Albumin 3.5 3.2 - 4.6 g/dL     Globulin 3.9 (H) 2.3 - 3.5 g/dL     A-G Ratio 0.9 (L) 1.2 - 3.5     PROTHROMBIN TIME + INR     Collection Time: 03/15/22  3:02 PM   Result Value Ref Range     Prothrombin time 13.0 12.6 - 14.5 sec     INR 1.0     PTT     Collection Time: 03/15/22  3:02 PM   Result Value Ref Range     aPTT 27.8 24.1 - 35.1 SEC   MAGNESIUM     Collection Time: 03/15/22  3:02 PM   Result Value Ref Range     Magnesium 1.8 1.8 - 2.4 mg/dL   CBC W/O DIFF     Collection Time: 03/15/22  7:13 PM   Result Value Ref Range     WBC 11.3 (H) 4.3 - 11.1 K/uL     RBC 3.23 (L) 4.05 - 5.2 M/uL     HGB 9.9 (L) 11.7 - 15.4 g/dL     HCT 31.5 (L) 35.8 - 46.3 %     MCV 97.5 79.6 - 97.8 FL     MCH 30.7 26.1 - 32.9 PG     MCHC 31.4 31.4 - 35.0 g/dL     RDW 15.4 (H) 11.9 - 14.6 %     PLATELET 131 016 - 316 K/uL     MPV 9.4 9.4 - 12.3 FL     ABSOLUTE NRBC 0.00 0.0 - 0.2 K/uL            XR HIP RT W OR WO PELV 2-3 VWS     Result Date: 3/15/2022  Postoperative changes from repair of subacute right hip fracture. Medial displacement of lesser trochanter. CT HEAD WO CONT     Result Date: 3/16/2022  Unchanged thin right subdural hematoma, with no mass effect or midline shift. CT HEAD WO CONT     Result Date: 3/15/2022  1. Right frontal scalp hematoma. 2. Thin extra-axial structure along the right frontal convexity measuring approximately 2 mm in thickness. This may be a very small subdural hematoma. The findings were called to Dr. Bee Reilly on 3/15/2022 at 2:48 PM by Dr. Cynthia Barragan. [address] WO CONT     Result Date: 3/15/2022  1.  No acute findings in the cervical spine. 2. Foraminal stenosis and degenerative spondylosis at C5-C6 and C6-C7. Echo Results  (Last 48 hours)     None                Initial Recommendations:      Cardiac Problems:     Falls: Patient may need home health assessment with 2 recent falls resulting in fractured femur and now subdural hematoma. Subdural hematoma: Secondary to mechanical fall per primary team,     CAD: Patient's recently post non-STEMI with complicated stenting to the LAD. Would continue 81 mg aspirin daily if okay with neurosurgery. Probably would only hold Plavix less than 3 to 4 days but restart as soon as possible in the setting of subdural hematoma. Review of imaging by radiology shows unchanged subdural hematoma from the previous day. Thank you very much for requesting a Cardiology Evaluation. We appreciate the opportunity to participate in this patient's care. We will follow along with above stated plan. This is initial management plan but please see attendings consult note for full plan of care. Jerilyn Rosas NP AGACNP-BC    ATTENDING ADDENDUM:    Patient seen and examined by me. Agree with above note by physician extender. Key findings are:  No CP, CHF, or arrhythmia symptoms at present. She unfortunately is in a difficult situation with recent complex stenting of the LAD/diagonal bifurcation with synergy drug-eluting stent culotte and balloon angioplasty to restenosis in the distal right coronary 5 weeks ago. She now has a mechanical induced subdural hematoma after a fall, tripping over her oxygen cable. Had multiple falls in the past including a recent fall resulting in a hip fracture. She is debilitated and weak and most likely needs longer term rehab and potentially assisted living/nursing home placement. We will defer to primary physicians for disposition.   From an antiplatelet standpoint, it is fine to hold aspirin for the next 48 to 72 hours but ideally I would like to resume this as soon as possible from a neurosurgical/intracranial bleeding standpoint. Her last Effient was yesterday morning. I would not resume this, but I would resume clopidogrel 75 mg daily as soon as possible from a cardiac perspective to prevent LAD/diagonal stent thrombosis. Ideally I would like to start back dual antiplatelet therapy (using aspirin and clopidogrel) in 3 to 4 days if possible, but we will wait to see what serial CT scanning shows for enlargement of the subdural hematoma and follow her clinical status closely. She understands her risk for progression of intracranial bleeding with dual antiplatelet therapy, and also understands the risk for acute stent thrombosis and anterior myocardial infarction or inferior infarction with cessation of dual antiplatelet therapy. We will follow closely with you. CV- RRR without murmur, no S3, no JVD at 45 degrees  Lungs- Clear bilaterally apically, decreased bibasilar but no crackles or wheezing, superficial bruises on all 4 extremities  Abd- soft, nontender, nondistended  Ext- no edema    Assessment and plan:    Recent non-STEMI/CAD--recent balloon dilatation to the distal right coronary and carotid bifurcation stenting with synergy drug-eluting stents to the LAD/diagonal bifurcation 5 weeks ago. See above regarding recommendations for dual antiplatelet therapy. Ideally would like to start back low-dose aspirin and 75 mg clopidogrel as soon as is safe from a neurosurgical/neurologic standpoint. Her last Effient was yesterday. We will not resume this but convert over to clopidogrel when okay with neurosurgery. Subdural hematoma-stable clinically after a fall yesterday, tripped over oxygen cable. Follow closely with serial CT imaging per neurosurgery recommendations. Lucid and appropriate at the present time. Debility-recurrent falls, fractured femur, now with intracranial hemorrhage. Needs disposition to a nursing home or assisted living/rehab. Defer to primary physicians. Dyslipidemia-max dose statin as tolerated    Hypertension-stable, continue meds. Permissive hypertension okay with me to prevent postural hypotension/falls    COPD-stable at the present time with no increased work of breathing and clear lungs without wheezing or crackles. Follow clinically on home meds.       Luda See MD  [facility] Cardiology  Pager [phone]

## 2022-03-16 NOTE — PROGRESS NOTES
CM reviewed patient medical chart. Patient was just resently d/c from [facility] in February. Patient lives in [place] home with ex-spouse, with one step to enter into the home. Pt reports being independent with ADLs but having problems with bathing r/t SOB. Pt has to take frequent breaks. Pt driving prior to admission. Pt reports having DMEs such as cane and oxygen (portable and concentrator) from [facility]. Pt uses 1L oxygen via [place] at home at night but in recent week using oxygen during the day. Pt family able to transport home, to doctor apt,  medications, and get groceries. Pt primary pharmacy is [facility] on [address]. Pt reports being able to afford medications. Pt reports no availability for family to be at home with pt 25 hours. Pt ex-spouse works from home 3-11 pm but has to go to office DT for double shift on Thursdays.  CM to continue to follow and monitor for any needs that may occur.       Care Management Interventions  PCP Verified by CM: Yes  Mode of Transport at Discharge:  Other (see comment)  Transition of Care Consult (CM Consult): Discharge Planning  Discharge Durable Medical Equipment: No  Physical Therapy Consult: Yes  Occupational Therapy Consult: Yes  Speech Therapy Consult: No  Support Systems: Spouse/Significant Other  Confirm Follow Up Transport: Family  The Patient and/or Patient Representative was Provided with a Choice of Provider and Agrees with the Discharge Plan?: No  Freedom of Choice List was Provided with Basic Dialogue that Supports the Patient's Individualized Plan of Care/Goals, Treatment Preferences and Shares the Quality Data Associated with the Providers?: No  [place] Resource Information Provided?: No  Discharge Location  Patient Expects to be Discharged to[de-identified] Unable to determine at this time

## 2022-03-16 NOTE — PROGRESS NOTES
Hospitalist Progress Note   Admit Date:  3/15/2022  2:01 PM   Name:  Linda Palumbo   Age:  66 y.o. Sex:  female  :  1950   MRN:  [id number]   Room:  [facility]    Presenting Complaint: Fall (Pt reports fall today at home on hardwood, tripped over 02 cord. Hit head. Denies any LOC. AAOX4. PERRLA. Pain to R Hip. No bleeding. Pt does take blood thinner. )    Reason(s) for Admission: Subdural hemorrhage following injury [facility]) [facility] Course & Interval History:     Ms. Karolyn Osman is a 65 yo female with PMH of CAD on asa, effient ([facility] with stent/ PCI ), right hip fracture repair, COPD on home O2 at 1 L NC, admitted s/p trip over O2 tubing and fall. CT head shows  \"    IMPRESSION     1. Right frontal scalp hematoma.     2. Thin extra-axial structure along the right frontal convexity measuring  approximately 2 mm in thickness. This may be a very small subdural hematoma.:    CT cspine shows DJD. followup CT head no changes. Per neurosurgery, no intervention needed. Xray right hip shows   \"  IMPRESSION  Postoperative changes from repair of subacute right hip fracture. Medial displacement of lesser trochanter. \"      No intervention per orthopedics. She has been admitted to ICU for neuro checks. She is requesting 9th floor rehab.        Subjective/24hr Events (22):      Up to chair, has right hip pain s/p fall, ate ok, no BM, no dyspnea or cough,       Assessment & Plan:     Principal Problem:    Post-traumatic subdural hematoma (HCC)   ·  24 hours of ICU monitoring   · Goal BP < 140/90  · PT,OT  · 9th floor [facility] consult       Active Problems:    COPD, very severe (HCC)     Chronic respiratory failure with hypoxia (HCC)  · Stable   · On chronic azithromycin, prednisone      HTN:  · Continued metoprolol, cardizem       Closed right hip fracture (HCC) s/p repair:  · Ortho to evaluate per her request with abnormal xray      Anemia:  · Trend HGB      CAD:  · plavix on hold  · Cardiology consult  · Continued lipitor, zetia      Hypomagnesemia:  · Replaced and repeat lab      Dispo/Discharge Planning:      Pending     Diet:  ADULT DIET Regular  DVT PPx:  SCD  Code status: DNR    Hospital Problems as of 3/16/2022 Date Reviewed: 2/24/2022          Codes Class Noted - Resolved POA    * (Principal) Post-traumatic subdural hematoma ([facility] ICD-10-CM: T64.6W4J  ICD-9-CM: 852.20  3/15/2022 - Present Yes        Hypokalemic alkalosis ICD-10-CM: E87.3  ICD-9-CM: 276.3  3/15/2022 - Present Yes        Closed right hip fracture ([facility] [address] ICD-10-CM: S72.001A  ICD-9-CM: 820.8  2/4/2022 - Present Yes        DNR (do not resuscitate) ICD-10-CM: Z66  ICD-9-CM: V49.86  11/9/2021 - Present Yes    Overview Signed 11/9/2021 12:25 PM by Janine Esquivel NP     POST form completed - DNR but full treatment, including intubation. No TRACH. No PEG.               [facility]rilobular emphysema ([facility] ICD-10-CM: J43.2  ICD-9-CM: 492.8  12/20/2019 - Present Yes        Chronic respiratory failure with hypoxia (HCC) (Chronic) ICD-10-CM: J96.11  ICD-9-CM: 518.83, 799.02  6/8/2019 - Present Yes        CAD S/P percutaneous coronary angioplasty ICD-10-CM: I25.10, Z98.61  ICD-9-CM: 414.01, V45.82  5/31/2019 - Present Yes        COPD, very severe (HCC) (Chronic) ICD-10-CM: J44.9  ICD-9-CM: 496  8/20/2013 - Present Yes              Objective:     Patient Vitals for the past 24 hrs:   Temp Pulse Resp BP SpO2   03/16/22 1415  64   100 %   03/16/22 1400  67  (!) 117/58 99 %   03/16/22 1345  63 24  100 %   03/16/22 1330  62 (!) 44 (!) 117/56 100 %   03/16/22 1315  65 16  100 %   03/16/22 1300  66 19 119/60 100 %   03/16/22 1245  73 26  100 %   03/16/22 1230  72 (!) 37 (!) 104/54 99 %   03/16/22 1215  70 24  100 %   03/16/22 1200  65 18 (!) 116/56 100 %   03/16/22 1145  66 20  100 %   03/16/22 1130  68 18 (!) 114/56 100 %   03/16/22 1115  74 24  100 %   03/16/22 1100 98.1 °F (36.7 °C) 72 19 (!) 99/48 99 %   03/16/22 1045  78 25 (!) 156/67 99 %   03/16/22 1015  77 24  97 %   03/16/22 1000  71 22 118/60 96 %   03/16/22 0945  70 28  95 %   03/16/22 0930  71 28  97 %   03/16/22 0915  75 23  96 %   03/16/22 0900  69 24 120/63 96 %   03/16/22 0845  69 26  96 %   03/16/22 0840     97 %   03/16/22 0830  71 (!) 34  97 %   03/16/22 0815  67 24  98 %   03/16/22 0800  64 24 (!) 115/58 98 %   03/16/22 0745  66 21  98 %   03/16/22 0730  65 (!) 34  98 %   03/16/22 0715  65 28  97 %   03/16/22 0700 97.9 °F (36.6 °C) 66 22 (!) 113/57 96 %   03/16/22 0645  72 (!) 43 (!) 113/57 95 %   03/16/22 0630  66 21 (!) 103/51 98 %   03/16/22 0621  68  (!) 108/54    03/16/22 0615  65 21 (!) 108/54 99 %   03/16/22 0530  63 (!) 34 (!) 111/56 99 %   03/16/22 0515  63 20  98 %   03/16/22 0500  66 19 (!) 108/53 95 %   03/16/22 0445  67 21  97 %   03/16/22 0430  68 13 (!) 118/58 98 %   03/16/22 0415  68 17  98 %   03/16/22 0400  62 17 (!) 110/56 99 %   03/16/22 0345  61 18  100 %   03/16/22 0330  66 (!) 31 (!) 113/56 100 %   03/16/22 0315  62 25  100 %   03/16/22 0300 98 °F (36.7 °C) 63 (!) 33 (!) 117/58 100 %   03/16/22 0245  62 (!) 32  100 %   03/16/22 0230  62 (!) 31 (!) 114/58 99 %   03/16/22 0215  61 (!) 39  100 %   03/16/22 0200  61 (!) 35 135/64 100 %   03/16/22 0145  60 27  100 %   03/16/22 0130  (!) 58 26 124/60 100 %   03/16/22 0115  (!) 59 (!) 33  99 %   03/16/22 0100  60 (!) 32 (!) 110/56 98 %   03/16/22 0045  64 17  99 %   03/16/22 0030  67 29 (!) 112/52 99 %   03/16/22 0021  68  (!) 112/52    03/16/22 0015  62 27 (!) 117/57 99 %   03/16/22 0000  61 (!) 36 (!) 118/59 100 %   03/15/22 2333  62 (!) 35 (!) 115/58 100 %   03/15/22 2303 97.7 °F (36.5 °C) 65 18 (!) 107/53 99 %   03/15/22 2233  67 24 (!) 108/53 98 %   03/15/22 2209  74 (!) 35  98 %   03/15/22 2203  71 30 (!) 100/50 98 %   03/15/22 2133  69 22 (!) 92/44 99 %   03/15/22 2103  64 (!) 34 (!) 113/56 99 %   03/15/22 2033  69 (!) 34 (!) 108/51 100 %   03/15/22 2008     100 %   03/15/22 2003  68 (!) 40 (!) 123/58 99 %   03/15/22 1904   17     03/15/22 1903 98.6 °F (37 °C) 66 18 (!) 119/56 100 %   03/15/22 1900  69  (!) 108/51 99 %   03/15/22 1859   20     03/15/22 1845  68 (!) 33  99 %   03/15/22 1830  66 (!) 44 (!) 128/58 100 %   03/15/22 1815  65 (!) 33  99 %   03/15/22 1800  72 21 (!) 121/57 99 %   03/15/22 1730  70 19 129/78 99 %   03/15/22 1715  69 18 (!) 123/56 99 %   03/15/22 1700  64 18 (!) 130/58 99 %   03/15/22 1545  67 17 (!) 117/57 99 %     Oxygen Therapy  O2 Sat (%): 100 % (03/16/22 1415)  Pulse via Oximetry: 64 beats per minute (03/16/22 1415)  O2 Device: Nasal cannula (03/16/22 1100)  Skin Assessment: Clean, dry, & intact (03/16/22 1100)  Skin Protection for O2 Device: No (03/16/22 1100)  O2 Flow Rate (L/min): 2 l/min (03/16/22 1100)    Estimated body mass index is 20.63 kg/m² as calculated from the following:    Height as of this encounter: 5' 8\" (1.727 m). Weight as of this encounter: 61.6 kg (135 lb 11.2 oz). Intake/Output Summary (Last 24 hours) at 3/16/2022 1544  Last data filed at 3/16/2022 0956  Gross per 24 hour   Intake 1281.25 ml   Output    Net 1281.25 ml         Physical Exam:     Blood pressure (!) 117/58, pulse 64, temperature 98.1 °F (36.7 °C), resp. rate 24, height 5' 8\" (1.727 m), weight 61.6 kg (135 lb 11.2 oz), SpO2 100 %. General:    Well nourished. No overt distress  CV:   RRR. No m/r/g. No jugular venous distension. No edema   Lungs:   CTAB. No wheezing, rhonchi, or rales. Respirations even, unlabored anterior   Abdomen: Bowel sounds present. Soft, nontender, nondistended. Extremities: No cyanosis or clubbing. No edema  Skin:     No rashes and normal coloration. Warm and dry. Neuro:  grossly intact. Psych:  Normal mood and affect.       I have reviewed ordered lab tests and independently visualized imaging below:    Recent Labs:  Recent Results (from the past 48 hour(s))   CBC WITH AUTOMATED DIFF    Collection Time: 03/15/22  3:02 PM   Result Value Ref Range    WBC 8.8 4.3 - 11.1 K/uL    RBC 3.47 (L) 4.05 - 5.2 M/uL    HGB 10.6 (L) 11.7 - 15.4 g/dL    HCT 33.6 (L) 35.8 - 46.3 %    MCV 96.8 79.6 - 97.8 FL    MCH 30.5 26.1 - 32.9 PG    MCHC 31.5 31.4 - 35.0 g/dL    RDW 15.5 (H) 11.9 - 14.6 %    PLATELET 191 606 - 851 K/uL    MPV 9.7 9.4 - 12.3 FL    ABSOLUTE NRBC 0.00 0.0 - 0.2 K/uL    DF AUTOMATED      NEUTROPHILS 89 (H) 43 - 78 %    LYMPHOCYTES 7 (L) 13 - 44 %    MONOCYTES 4 4.0 - 12.0 %    EOSINOPHILS 0 (L) 0.5 - 7.8 %    BASOPHILS 0 0.0 - 2.0 %    IMMATURE GRANULOCYTES 1 0.0 - 5.0 %    ABS. NEUTROPHILS 7.9 1.7 - 8.2 K/UL    ABS. LYMPHOCYTES 0.6 0.5 - 4.6 K/UL    ABS. MONOCYTES 0.3 0.1 - 1.3 K/UL    ABS. EOSINOPHILS 0.0 0.0 - 0.8 K/UL    ABS. BASOPHILS 0.0 0.0 - 0.2 K/UL    ABS. IMM. GRANS. 0.0 0.0 - 0.5 K/UL   METABOLIC PANEL, COMPREHENSIVE    Collection Time: 03/15/22  3:02 PM   Result Value Ref Range    Sodium 138 136 - 145 mmol/L    Potassium 2.6 (L) 3.5 - 5.1 mmol/L    Chloride 97 (L) 98 - 107 mmol/L    CO2 34 (H) 21 - 32 mmol/L    Anion gap 7 7 - 16 mmol/L    Glucose 112 (H) 65 - 100 mg/dL    BUN 13 8 - 23 MG/DL    Creatinine 0.90 0.6 - 1.0 MG/DL    GFR est AA >60 >60 ml/min/1.73m2    GFR est non-AA >60 >60 ml/min/1.73m2    Calcium 9.4 8.3 - 10.4 MG/DL    Bilirubin, total 0.6 0.2 - 1.1 MG/DL    ALT (SGPT) 22 12 - 65 U/L    AST (SGOT) 18 15 - 37 U/L    Alk.  phosphatase 136 50 - 136 U/L    Protein, total 7.4 6.3 - 8.2 g/dL    Albumin 3.5 3.2 - 4.6 g/dL    Globulin 3.9 (H) 2.3 - 3.5 g/dL    A-G Ratio 0.9 (L) 1.2 - 3.5     PROTHROMBIN TIME + INR    Collection Time: 03/15/22  3:02 PM   Result Value Ref Range    Prothrombin time 13.0 12.6 - 14.5 sec    INR 1.0     PTT    Collection Time: 03/15/22  3:02 PM   Result Value Ref Range    aPTT 27.8 24.1 - 35.1 SEC   MAGNESIUM    Collection Time: 03/15/22  3:02 PM   Result Value Ref Range    Magnesium 1.8 1.8 - 2.4 mg/dL   CBC W/O DIFF    Collection Time: 03/15/22  7:13 PM   Result Value Ref Range    WBC 11.3 (H) 4.3 - 11.1 K/uL    RBC 3.23 (L) 4.05 - 5.2 M/uL    HGB 9.9 (L) 11.7 - 15.4 g/dL    HCT 31.5 (L) 35.8 - 46.3 %    MCV 97.5 79.6 - 97.8 FL    MCH 30.7 26.1 - 32.9 PG    MCHC 31.4 31.4 - 35.0 g/dL    RDW 15.4 (H) 11.9 - 14.6 %    PLATELET 401 842 - 612 K/uL    MPV 9.4 9.4 - 12.3 FL    ABSOLUTE NRBC 0.00 0.0 - 0.2 K/uL       All Micro Results     None          Other Studies:  CT HEAD WO CONT    Result Date: 3/16/2022  EXAM: Noncontrast CT head. INDICATION: Follow-up intracranial hemorrhage. COMPARISON: Yesterday's CT head. TECHNIQUE: Axial noncontrast CT images of the head were obtained. Radiation dose reduction techniques were used for this study. Our CT scanners use one or all of the following:  Automated exposure control, adjustment of the mA and/or kV according to patient size, iterative reconstruction. FINDINGS: A 2 mm thick subdural hematoma along the outer margin of the right frontal lobe is unchanged. No new hemorrhage is identified. There is no acute infarct, mass effect or midline shift. The skull and skull base are intact. There is a persistent right scalp hematoma. Unchanged thin right subdural hematoma, with no mass effect or midline shift.        Current Meds:  Current Facility-Administered Medications   Medication Dose Route Frequency    PHARMACY INFORMATION NOTE - Nursing IV Fluid Modifications Based On Serum Potassium  Lab Results  1 Each Other DAILY    ondansetron (ZOFRAN) injection 4 mg  4 mg IntraVENous Q6H PRN    acetaminophen (TYLENOL) tablet 650 mg  650 mg Oral Q4H PRN    acetaminophen (TYLENOL) suppository 650 mg  650 mg Rectal Q4H PRN    senna-docusate (PERICOLACE) 8.6-50 mg per tablet 2 Tablet  2 Tablet Oral QHS    bisacodyL (DULCOLAX) tablet 5 mg  5 mg Oral DAILY PRN    labetaloL (NORMODYNE;TRANDATE) injection 20 mg  20 mg IntraVENous Q4H PRN    0.9% sodium chloride infusion  75 mL/hr IntraVENous CONTINUOUS    LORazepam (ATIVAN) tablet 0.5 mg  0.5 mg Oral Q8H PRN    metoprolol succinate (TOPROL-XL) XL tablet 100 mg  100 mg Oral DAILY    polyethylene glycol (MIRALAX) packet 17 g  17 g Oral DAILY    nitroglycerin (NITROSTAT) tablet 0.4 mg  0.4 mg SubLINGual PRN    traZODone (DESYREL) tablet 50 mg  50 mg Oral QHS PRN    ezetimibe (ZETIA) tablet 10 mg  10 mg Oral DAILY    albuterol-ipratropium (DUO-NEB) 2.5 MG-0.5 MG/3 ML  3 mL Nebulization Q4H PRN    tiotropium bromide (SPIRIVA RESPIMAT) 2.5 mcg /actuation  2 Puff Inhalation DAILY    budesonide-formoteroL (SYMBICORT) 160-4.5 mcg/actuation HFA inhaler 2 Puff  2 Puff Inhalation BID RT    atorvastatin (LIPITOR) tablet 80 mg  80 mg Oral QHS    escitalopram oxalate (LEXAPRO) tablet 10 mg  10 mg Oral DAILY    pantoprazole (PROTONIX) tablet 40 mg  40 mg Oral ACB&D    oxyCODONE IR (ROXICODONE) tablet 5 mg  5 mg Oral Q4H PRN    gabapentin (NEURONTIN) capsule 100 mg  100 mg Oral TID    dilTIAZem IR (CARDIZEM) tablet 30 mg  30 mg Oral Q6H    predniSONE (DELTASONE) tablet 15 mg  15 mg Oral DAILY WITH BREAKFAST    methIMAzole (TAPAZOLE) tablet 10 mg  10 mg Oral DAILY    azithromycin (ZITHROMAX) tablet 500 mg  500 mg Oral Q MON, WED & FRI    niCARdipine (CARDENE) 25 mg in 0.9% sodium chloride 250 mL ([id number])  5-15 mg/hr IntraVENous TITRATE       Signed:  Barbie Birmingham MD    Part of this note may have been written by using a voice dictation software. The note has been proof read but may still contain some grammatical/other typographical errors.

## 2022-03-16 NOTE — DISCHARGE SUMMARY
[facility] Cardiology Initial Cardiac Evaluation                Date of  Admission: 3/15/2022  2:01 PM     PCP: Jesica Chong MD  Requested by: Dr Reddy Mary Beth  Primary Cardiologist: Dr Isabel Posey Attending: Dr Paolo Mathews    Reason for Evaluation: On DAPT      Andre Wood is a 66 y.o. female with hx of COPD, CAD status post PCI, chronic respiratory failure with hypoxia, centrilobular emphysema, hypertension, dependent at 1 L at night, pleurisy, thyroid disease, anxiety, arthritis, GERD, fall status post right femur fracture February 2022 and right carotid stenosis status post atherectomy in 2019. Patient had a fall and presents to the emergency department with a right scalp hematoma. The fall was reported as mechanical hitting the right side of the head and right hip. Patient states she fell while on oxygen and tripped over tubing. Patient last had a PCI on 2/7/2022 for non-STEMI requiring multiple stents after a fall with a femur fracture requiring repair. Patient had a repeat admission 02/26/2022 for chest pain and was changed from Effient to Plavix at discharge. CT of the head without contrast showed a small left temporal convexity with a subdural hematoma in the right without any evidence of brain compression or midline shift. Right hip shows repaired subacute right hip fracture and a medial displacement of the lesser trochanter. Neurosurgery was consulted with no acute intervention necessary with recommendation of repeat head CTs without contrast and to hold all anticoagulant antiplatelets. Patient denied any neuro changes at that time. Repeat CT of the head this morning showed no change from previous CT. Recent Cardiac Synopsis  LHC/NST: 2/7/2022 1. Normal left ventricular systolic function  2. Coronary artery disease as described below  3. Successful two-vessel complex PCI is performed as described above.   A good result was obtained on the large LAD diagonal with 2 Synergy stents and a high-pressure 2.5/3.0 culotte postdilatation. .  The distal right coronary artery was successfully treated with 2.5 mm POBA. Echo: 2/6/2022   Left Ventricle: Left ventricle size is normal. Normal wall thickness. Normal wall motion. Low normal left ventricular systolic function. EF by 2D Simpsons Biplane is 52%. Normal diastolic function.   Aortic Valve: Mildly thickened cusps.   Mitral Valve: Mildly thickened leaflets. Mildly calcified anterior leaflet. Moderate to severe transvalvular regurgitation with an eccentrically directed jet and may underestimate severity. Transesophageal echo  recommended for further evaluation of potentially severe mitral regurgitation.   Aorta: Dilated annulus.     EKG: NSR with new T wave changes with inversions in the lateral Leads       Past Medical History:   Diagnosis Date    Arrhythmia     Arthritis     CAD (coronary artery disease) 2009, 8/19/2013    PCI,  Sveta Main     Carotid stenosis, right     endarterectomy 11/25/19    Chronic anxiety 4/27/2017    COPD     recent referral to [address] for lung transplant    Depression 5/6/2021    Emphysema lung ([address]     GERD (gastroesophageal reflux disease)     controlled with medication    History of echocardiogram 06/07/2019    History of echocardiogram     echo 06/07/19 LVEF 55-60%    Hypertension     Nausea & vomiting     Oxygen dependent     1 liter at night    Pleurisy     Thyroid disease     pt denies      Past Surgical History:   Procedure Laterality Date    HX GYN      rebuilt cervix    HX HEART CATHETERIZATION  06/06/2019    last stent- per patient- 10 stents total    HX OTHER SURGICAL      HX TONSIL AND ADENOIDECTOMY      VASCULAR SURGERY PROCEDURE UNLIST Right 11/25/2019    carotid endarterectomy     Allergies   Allergen Reactions    Promethazine Nausea and Vomiting and Other (comments)     Severe vomiting       Family History   Problem Relation Age of Onset    No Known Problems Mother         adopted Social History     Socioeconomic History    Marital status:      Spouse name: Not on file    Number of children: Not on file    Years of education: Not on file    Highest education level: Not on file   Occupational History    Occupation: [employer]     Employer: SELF EMPLOYED     Comment: home   Tobacco Use    Smoking status: Former Smoker     Packs/day: 0.75     Years: 53.00     Pack years: 39.75     Types: Cigarettes     Quit date: 2018     Years since quittin.2    Smokeless tobacco: Never Used   Substance and Sexual Activity    Alcohol use: Yes     Comment: rarely    Drug use: No    Sexual activity: Not on file   Other Topics Concern   [email or web address] Service Not Asked    Blood Transfusions Not Asked    Caffeine Concern Not Asked    Occupational Exposure Not Asked    Hobby Hazards Not Asked    Sleep Concern Not Asked    Stress Concern Not Asked    Weight Concern Not Asked    Special Diet Not Asked    Back Care Not Asked    Exercise Not Asked    Bike Helmet Not Asked    [address] Not Asked    Self-Exams Not Asked   Social History Narrative    , has 2 children, 1 step child. 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      Social Determinants of Health     Financial Resource Strain:     Difficulty of Paying Living Expenses: Not on file   Food Insecurity:     Worried About [address] in the Last Year: Not on file    [address] in the Last Year: Not on file   Transportation Needs:     Lack of Transportation (Medical): Not on file    Lack of Transportation (Non-Medical):  Not on file   Physical Activity:     Days of Exercise per Week: Not on file    Minutes of Exercise per Session: Not on file   Stress:     Feeling of Stress : Not on file   Social Connections:     Frequency of Communication with Friends and Family: Not on file    Frequency of Social Gatherings with Friends and Family: Not on file    Attends Religion Services: Not on file  Active Member of Clubs or Organizations: Not on file    Attends Club or Organization Meetings: Not on file    Marital Status: Not on file   Intimate Partner Violence:     Fear of Current or Ex-Partner: Not on file    Emotionally Abused: Not on file    Physically Abused: Not on file    Sexually Abused: Not on file   Housing Stability:     Unable to Pay for Housing in the Last Year: Not on file    Number of [place] in the Last Year: Not on file    Unstable Housing in the Last Year: Not on file       Prior to Admission Medications   Prescriptions Last Dose Informant Patient Reported? Taking? LORazepam (ATIVAN) 0.5 mg tablet   No No   Sig: Take 1 Tablet by mouth every eight (8) hours as needed for Anxiety or Agitation. Max Daily Amount: 1.5 mg. PCP will prescribe this medication if it is necessary to continue. OTHER   No No   Sig: Handicap placard   OXYGEN-AIR DELIVERY SYSTEMS   Yes No   Si L/min by Nasal route continuous. acetaminophen (TYLENOL) 325 mg tablet   No No   Sig: Take 2 Tablets by mouth every eight (8) hours. Indications: for hip surgery pain   Patient not taking: Reported on 2022   acetaminophen (TYLENOL) 500 mg tablet   Yes No   Sig: Take 1,000 mg by mouth every eight (8) hours as needed for Pain. albuterol (PROVENTIL VENTOLIN) 2.5 mg /3 mL (0.083 %) nebu   No No   Sig: 3 mL by Nebulization route every six (6) hours as needed for Shortness of Breath. ICD-10-J44.9, please bill to Med B   aspirin delayed-release 81 mg tablet   Yes No   Sig: Take 81 mg by mouth daily. atorvastatin (LIPITOR) 80 mg tablet   No No   Sig: TAKE 1 TABLET BY MOUTH DAILY   azithromycin (ZITHROMAX) 500 mg tab   No No   Sig: Take 1 Tablet by mouth every Monday, Wednesday, Friday. budesonide-formoteroL (Symbicort) 160-4.5 mcg/actuation HFAA   No No   Sig: Take 2 Puffs by inhalation two (2) times a day.    cetirizine (ZYRTEC) 10 mg tablet   Yes No   Sig: Take 10 mg by mouth daily as needed for Allergies. escitalopram oxalate (LEXAPRO) 10 mg tablet   No No   Sig: Take 1 Tablet by mouth daily. PCP will continue to prescribe this medication. Indications: anxiousness associated with depression   ezetimibe (ZETIA) 10 mg tablet   No No   Sig: Take 1 Tablet by mouth daily. guaiFENesin ER (MUCINEX) 1,200 mg Ta12 ER tablet   No No   Sig: Take 1 Tablet by mouth every twelve (12) hours. Pulmonologist or PCP will continue to prescribe this medication. lidocaine 4 % patch   No No   Sig: Apply patch to affected area for 12 hours a day and remove for 12 hours a day. Patient not taking: Reported on 2022   magnesium oxide (MAG-OX) 400 mg tablet   No No   Sig: Take 1 Tablet by mouth daily for 20 doses. methIMAzole (TAPAZOLE) 10 mg tablet   No No   Sig: Take 1 Tablet by mouth daily. PCP will continue to prescribe this medication. Indications: overactive thyroid gland   metoprolol succinate (TOPROL-XL) 100 mg tablet   No No   Sig: Take 1 Tablet by mouth daily. multivitamin (ONE A DAY) tablet   Yes No   Sig: Take 1 Tablet by mouth daily. nitroglycerin (NITROSTAT) 0.4 mg SL tablet   No No   Si Tablet by SubLINGual route three (3) times daily as needed for Chest Pain. Up to 3 doses. Patient taking differently: 1 Tablet by SubLINGual route three (3) times daily as needed for Chest Pain. Up to 3 doses for chest pain. then call 911   nystatin (MYCOSTATIN) 100,000 unit/mL suspension   No No   Sig: Take 5 mL by mouth four (4) times daily. swish and spit  Indications: thrush   Patient not taking: Reported on 2022   pantoprazole (PROTONIX) 40 mg tablet   No No   Sig: TAKE 1 TABLET BY MOUTH TWICE DAILY   polyethylene glycol (Miralax) 17 gram/dose powder   No No   Sig: Take 17 g by mouth daily. Indications: constipation   Patient not taking: Reported on 2022   predniSONE (DELTASONE) 5 mg tablet   Yes No   Sig: Take 15 mg by mouth daily.    roflumilast (Daliresp) 500 mcg tab tablet   No No   Sig: Take 1 Tablet by mouth daily. senna-docusate (PERICOLACE) 8.6-50 mg per tablet   No No   Sig: Take 2 Tablets by mouth daily. Indications: constipation   Patient not taking: Reported on 2/26/2022   tiotropium (Spiriva with HandiHaler) 18 mcg inhalation capsule   No No   Sig: Take 1 Cap by inhalation daily. traZODone (DESYREL) 50 mg tablet   No No   Sig: Take 0.5 Tablets by mouth nightly as needed for Sleep. PCP will prescribe this medication if it is necessary to continue.   Indications: insomnia associated with depression      Facility-Administered Medications: None       Current Facility-Administered Medications   Medication Dose Route Frequency    potassium chloride (K-DUR, KLOR-CON M20) SR tablet 40 mEq  40 mEq Oral Q6H    PHARMACY INFORMATION NOTE - Nursing IV Fluid Modifications Based On Serum Potassium  Lab Results  1 Each Other DAILY    ondansetron (ZOFRAN) injection 4 mg  4 mg IntraVENous Q6H PRN    acetaminophen (TYLENOL) tablet 650 mg  650 mg Oral Q4H PRN    acetaminophen (TYLENOL) suppository 650 mg  650 mg Rectal Q4H PRN    senna-docusate (PERICOLACE) 8.6-50 mg per tablet 2 Tablet  2 Tablet Oral QHS    bisacodyL (DULCOLAX) tablet 5 mg  5 mg Oral DAILY PRN    labetaloL (NORMODYNE;TRANDATE) injection 20 mg  20 mg IntraVENous Q4H PRN    0.9% sodium chloride infusion  75 mL/hr IntraVENous CONTINUOUS    LORazepam (ATIVAN) tablet 0.5 mg  0.5 mg Oral Q8H PRN    metoprolol succinate (TOPROL-XL) XL tablet 100 mg  100 mg Oral DAILY    polyethylene glycol (MIRALAX) packet 17 g  17 g Oral DAILY    nitroglycerin (NITROSTAT) tablet 0.4 mg  0.4 mg SubLINGual PRN    traZODone (DESYREL) tablet 50 mg  50 mg Oral QHS PRN    ezetimibe (ZETIA) tablet 10 mg  10 mg Oral DAILY    albuterol-ipratropium (DUO-NEB) 2.5 MG-0.5 MG/3 ML  3 mL Nebulization Q4H PRN    tiotropium bromide (SPIRIVA RESPIMAT) 2.5 mcg /actuation  2 Puff Inhalation DAILY    budesonide-formoteroL (SYMBICORT) 160-4.5 mcg/actuation HFA inhaler 2 Puff  2 Puff Inhalation BID RT    atorvastatin (LIPITOR) tablet 80 mg  80 mg Oral QHS    escitalopram oxalate (LEXAPRO) tablet 10 mg  10 mg Oral DAILY    pantoprazole (PROTONIX) tablet 40 mg  40 mg Oral ACB&D    oxyCODONE IR (ROXICODONE) tablet 5 mg  5 mg Oral Q4H PRN    gabapentin (NEURONTIN) capsule 100 mg  100 mg Oral TID    dilTIAZem IR (CARDIZEM) tablet 30 mg  30 mg Oral Q6H    predniSONE (DELTASONE) tablet 15 mg  15 mg Oral DAILY WITH BREAKFAST    methIMAzole (TAPAZOLE) tablet 10 mg  10 mg Oral DAILY    azithromycin (ZITHROMAX) tablet 500 mg  500 mg Oral Q MON, WED & FRI    niCARdipine (CARDENE) 25 mg in 0.9% sodium chloride 250 mL ([id number])  5-15 mg/hr IntraVENous TITRATE       Review of Systems   Constitutional: Negative for weight gain and weight loss. HENT: Negative. Eyes: Negative. Cardiovascular: Negative for chest pain (currently pain free), claudication, cyanosis, dyspnea on exertion, irregular heartbeat, leg swelling, near-syncope, orthopnea, palpitations, paroxysmal nocturnal dyspnea and syncope. Respiratory: Negative for cough, shortness of breath and wheezing. Endocrine: Negative. Skin: Negative. Musculoskeletal: Positive for falls. Gastrointestinal: Negative for nausea and vomiting. Genitourinary: Negative. Neurological: Negative for dizziness. Psychiatric/Behavioral: Negative. Allergic/Immunologic: Negative.          Physical Exam  Vitals:    03/16/22 0621 03/16/22 0630 03/16/22 0645 03/16/22 0700   BP: (!) 108/54 (!) 103/51 (!) 113/57    Pulse: 68 66 72 66   Resp:  21 (!) 43 22   Temp:       SpO2:  98% 95% 96%   Weight:       Height:           Last 3 Recorded Weights in this Encounter    03/15/22 1405   Weight: 61.6 kg (135 lb 11.2 oz)         Intake/Output Summary (Last 24 hours) at 3/16/2022 0840  Last data filed at 3/16/2022 0530  Gross per 24 hour   Intake 1156.25 ml   Output    Net 1156.25 ml       Net IO Since Admission: 1,156.25 mL [03/16/22 0840]      Physical Exam:  General: Well Developed, Well Nourished, No Acute Distress  HEENT: pupils equal and round, no abnormalities noted  Neck: supple, no JVD, no carotid bruits  Heart: S1S2 with RRR without murmurs or gallops  Lungs: Clear throughout auscultation bilaterally without adventitious sounds  Abd: soft, nontender, nondistended, with good bowel sounds  Ext: warm, no edema, calves supple/nontender, pulses 2+ bilaterally  Skin: warm and dry  Psychiatric: Normal mood and affect  Neurologic: Alert and oriented X 3      Recent Results (from the past 24 hour(s))   CBC WITH AUTOMATED DIFF    Collection Time: 03/15/22  3:02 PM   Result Value Ref Range    WBC 8.8 4.3 - 11.1 K/uL    RBC 3.47 (L) 4.05 - 5.2 M/uL    HGB 10.6 (L) 11.7 - 15.4 g/dL    HCT 33.6 (L) 35.8 - 46.3 %    MCV 96.8 79.6 - 97.8 FL    MCH 30.5 26.1 - 32.9 PG    MCHC 31.5 31.4 - 35.0 g/dL    RDW 15.5 (H) 11.9 - 14.6 %    PLATELET 932 976 - 617 K/uL    MPV 9.7 9.4 - 12.3 FL    ABSOLUTE NRBC 0.00 0.0 - 0.2 K/uL    DF AUTOMATED      NEUTROPHILS 89 (H) 43 - 78 %    LYMPHOCYTES 7 (L) 13 - 44 %    MONOCYTES 4 4.0 - 12.0 %    EOSINOPHILS 0 (L) 0.5 - 7.8 %    BASOPHILS 0 0.0 - 2.0 %    IMMATURE GRANULOCYTES 1 0.0 - 5.0 %    ABS. NEUTROPHILS 7.9 1.7 - 8.2 K/UL    ABS. LYMPHOCYTES 0.6 0.5 - 4.6 K/UL    ABS. MONOCYTES 0.3 0.1 - 1.3 K/UL    ABS. EOSINOPHILS 0.0 0.0 - 0.8 K/UL    ABS. BASOPHILS 0.0 0.0 - 0.2 K/UL    ABS. IMM.  GRANS. 0.0 0.0 - 0.5 K/UL   METABOLIC PANEL, COMPREHENSIVE    Collection Time: 03/15/22  3:02 PM   Result Value Ref Range    Sodium 138 136 - 145 mmol/L    Potassium 2.6 (L) 3.5 - 5.1 mmol/L    Chloride 97 (L) 98 - 107 mmol/L    CO2 34 (H) 21 - 32 mmol/L    Anion gap 7 7 - 16 mmol/L    Glucose 112 (H) 65 - 100 mg/dL    BUN 13 8 - 23 MG/DL    Creatinine 0.90 0.6 - 1.0 MG/DL    GFR est AA >60 >60 ml/min/1.73m2    GFR est non-AA >60 >60 ml/min/1.73m2    Calcium 9.4 8.3 - 10.4 MG/DL    Bilirubin, total 0.6 0.2 - 1.1 MG/DL    ALT (SGPT) 22 12 - 65 U/L    AST (SGOT) 18 15 - 37 U/L    Alk. phosphatase 136 50 - 136 U/L    Protein, total 7.4 6.3 - 8.2 g/dL    Albumin 3.5 3.2 - 4.6 g/dL    Globulin 3.9 (H) 2.3 - 3.5 g/dL    A-G Ratio 0.9 (L) 1.2 - 3.5     PROTHROMBIN TIME + INR    Collection Time: 03/15/22  3:02 PM   Result Value Ref Range    Prothrombin time 13.0 12.6 - 14.5 sec    INR 1.0     PTT    Collection Time: 03/15/22  3:02 PM   Result Value Ref Range    aPTT 27.8 24.1 - 35.1 SEC   MAGNESIUM    Collection Time: 03/15/22  3:02 PM   Result Value Ref Range    Magnesium 1.8 1.8 - 2.4 mg/dL   CBC W/O DIFF    Collection Time: 03/15/22  7:13 PM   Result Value Ref Range    WBC 11.3 (H) 4.3 - 11.1 K/uL    RBC 3.23 (L) 4.05 - 5.2 M/uL    HGB 9.9 (L) 11.7 - 15.4 g/dL    HCT 31.5 (L) 35.8 - 46.3 %    MCV 97.5 79.6 - 97.8 FL    MCH 30.7 26.1 - 32.9 PG    MCHC 31.4 31.4 - 35.0 g/dL    RDW 15.4 (H) 11.9 - 14.6 %    PLATELET 285 126 - 813 K/uL    MPV 9.4 9.4 - 12.3 FL    ABSOLUTE NRBC 0.00 0.0 - 0.2 K/uL        XR HIP RT W OR WO PELV 2-3 VWS    Result Date: 3/15/2022  Postoperative changes from repair of subacute right hip fracture. Medial displacement of lesser trochanter. CT HEAD WO CONT    Result Date: 3/16/2022  Unchanged thin right subdural hematoma, with no mass effect or midline shift. CT HEAD WO CONT    Result Date: 3/15/2022  1. Right frontal scalp hematoma. 2. Thin extra-axial structure along the right frontal convexity measuring approximately 2 mm in thickness. This may be a very small subdural hematoma. The findings were called to Dr. Jose Huitron on 3/15/2022 at 2:48 PM by Dr. Sumi Toledo. [facility] WO CONT    Result Date: 3/15/2022  1. No acute findings in the cervical spine. 2. Foraminal stenosis and degenerative spondylosis at C5-C6 and C6-C7.       Echo Results  (Last 48 hours)    None            Initial Recommendations:      Cardiac Problems:    Falls: Patient may need home health assessment with 2 recent falls resulting in fractured femur and now subdural hematoma. Subdural hematoma: Secondary to mechanical fall per primary team,    CAD: Patient's recently post non-STEMI with complicated stenting to the LAD. Would continue 81 mg aspirin daily if okay with neurosurgery. Probably would only hold Plavix less than 3 to 4 days but restart as soon as possible in the setting of subdural hematoma. Review of imaging by radiology shows unchanged subdural hematoma from the previous day. Thank you very much for requesting a Cardiology Evaluation. We appreciate the opportunity to participate in this patient's care. We will follow along with above stated plan. This is initial management plan but please see attendings consult note for full plan of care.     Collette Fuller NP AGACNP-BC

## 2022-03-16 NOTE — PROGRESS NOTES
Consult Received:     Avulsion of the lesser trochanter in patient with prior ORIF w/ short IM nail. X-rays reviewed. Patient may be WBAT with assistance. Will see patient tomorrow morning to discuss.

## 2022-03-16 NOTE — PROGRESS NOTES
ACUTE PHYSICAL THERAPY GOALS:  (Developed with and agreed upon by patient and/or caregiver. )  LTG:  (1.)Ms. Steph Carnes will move from supine to sit and sit to supine , scoot up and down and roll side to side in bed with MODIFIED INDEPENDENCE within 7 treatment day(s). (2.)Ms. Steph Carnes will transfer from bed to chair and chair to bed with MODIFIED INDEPENDENCE using the least restrictive device within 7 treatment day(s). (3.)Ms. Steph Carnes will ambulate with SUPERVISION for 500 feet with the least restrictive device within 7 treatment day(s). (4.)Ms. Steph Carnes will participate in therapeutic activity/exercises x 25 minutes for increased strength within 7 treatment days.     ________________________________________________________________________________________________          PHYSICAL THERAPY ASSESSMENT: Initial Assessment and AM PT Treatment Day # 1      Micah Skinner is a 66 y.o. female   PRIMARY DIAGNOSIS: Post-traumatic subdural hematoma (HCC)  Subdural hemorrhage following [facility] ([facility] [S06.5X9A]       Reason for Referral:    ICD-10: Treatment Diagnosis: Generalized Muscle Weakness (M62.81)  Difficulty in walking, Not elsewhere classified (R26.2)  History of falling (Z91.81)  [place]PAT[place]: [place] / [place] / Product Type: Medicare /     ASSESSMENT:     REHAB RECOMMENDATIONS:   Recommendation to date pending progress:  [address] [place] [address]  Equipment:    To Be Determined     PRIOR LEVEL OF FUNCTION:  (Prior to Hospitalization) INITIAL/CURRENT LEVEL OF FUNCTION:  (Most Recently Demonstrated)   Bed Mobility:   Independent  Sit to Stand:   Modified Independent  Transfers:   Modified Independent  Gait/Mobility:   Modified Independent Bed Mobility:   Minimal Assistance  Sit to Stand:   Minimal Assistance x 2  Transfers:   Minimal Assistance x 2  Gait/Mobility:   Contact Guard Assistance     ASSESSMENT:  Ms. Steph Carnes presents to PT with WFL AROM and decreased strength in B LEs. Pt was here recently for hip fracture and is back after tripping on O2 cord at home falling. Pt with SBP parameters today of 140-160. She was given Mark for bed mobility and Mark x 2 for STS with RW. Pt ambulated around room with CGA/RW and decreased gait speed. Pt fatigued quickly with activity and SpO2 noted at 88%. RN alerted and pt placed on 2 L/min after session. Ms. Eduar Marie could benefit from skilled PT as she is currently functioning below her baseline.         SUBJECTIVE:   Ms. Eduar Marie states, \"I'm back\"    SOCIAL HISTORY/LIVING ENVIRONMENT: Lives with friend and PTA using RW for ambulating; recent falls  Home Environment: Private residence  # Steps to Enter: 1  One/Two Story Residence: One story  Living Alone: No  Support Systems: Spouse/Significant Other  OBJECTIVE:     PAIN: VITAL SIGNS: LINES/DRAINS:   Pre Treatment: Pain Screen  Pain Scale 1: Numeric (0 - 10)  Pain Intensity 1: 0  Post Treatment: 6   Continuous Pulse Oximetry and IV  O2 Device: Nasal cannula     GROSS EVALUATION:  B LE Within Functional Limits Abnormal/ Functional Abnormal/ Non-Functional (see comments) Not Tested Comments:   AROM [x] [] [] []    PROM [] [] [] []    Strength [] [x] [] []    Balance [] [x] [] []    Posture [] [] [] []    Sensation [] [] [] []    Coordination [] [] [] []    Tone [] [] [] []    Edema [] [] [] []    Activity Tolerance [] [x] [] []     [] [] [] []      COGNITION/  PERCEPTION: Intact Impaired   (see comments) Comments:   Orientation [x] []    Vision [] []    Hearing [] []    Command Following [x] []    Safety Awareness [x] []     [] []      MOBILITY: I Mod I S SBA CGA Min Mod Max Total  NT x2 Comments:   Bed Mobility    Rolling [] [] [] [] [] [x] [] [] [] [] []    Supine to Sit [] [] [] [] [] [x] [] [] [] [] []    Scooting [] [] [] [] [] [] [] [] [] [] []    Sit to Supine [] [] [] [] [] [] [] [] [] [] []    Transfers    Sit to Stand [] [] [] [] [] [x] [] [] [] [] [x]    Bed to Chair [] [] [] [] [x] [x] [] [] [] [] []    Stand to Sit [] [] [] [] [] [x] [] [] [] [] [x]    I=Independent, Mod I=Modified Independent, S=Supervision, SBA=Standby Assistance, CGA=Contact Guard Assistance,   Min=Minimal Assistance, Mod=Moderate Assistance, Max=Maximal Assistance, Total=Total Assistance, NT=Not Tested  GAIT: I Mod I S SBA CGA Min Mod Max Total  NT x2 Comments:   Level of Assistance [] [] [] [] [x] [x] [] [] [] [] []    Distance 15 ft    DME Rolling Walker    Gait Quality Decreased gait speed    Weightbearing Status N/A     I=Independent, Mod I=Modified Independent, S=Supervision, SBA=Standby Assistance, CGA=Contact Guard Assistance,   Min=Minimal Assistance, Mod=Moderate Assistance, Max=Maximal Assistance, Total=Total Assistance, NT=Not Tested    [address] [place] [address] AM-[facility]       How much difficulty does the patient currently have. .. Unable A Lot A Little None   1. Turning over in bed (including adjusting bedclothes, sheets and blankets)? [] 1   [] 2   [x] 3   [] 4   2. Sitting down on and standing up from a chair with arms ( e.g., wheelchair, bedside commode, etc.)   [] 1   [] 2   [x] 3   [] 4   3. Moving from lying on back to sitting on the side of the bed? [] 1   [] 2   [x] 3   [] 4   How much help from another person does the patient currently need. .. Total A Lot A Little None   4. Moving to and from a bed to a chair (including a wheelchair)? [] 1   [] 2   [x] 3   [] 4   5. Need to walk in hospital room? [] 1   [] 2   [x] 3   [] 4   6. Climbing 3-5 steps with a railing? [] 1   [x] 2   [] 3   [] 4   © 2007, Trustees of [address] [place] [address], under license to [employer]. All rights reserved     Score:  Initial: 17 Most Recent: X (Date: -- )    Interpretation of Tool:  Represents activities that are increasingly more difficult (i.e. Bed mobility, Transfers, Gait).     PLAN:   FREQUENCY/DURATION: PT Plan of Care: 3 times/week for duration of hospital stay or until stated goals are met, whichever comes first.    PROBLEM LIST:   (Skilled intervention is medically necessary to address:)  1. Decreased ADL/Functional Activities  2. Decreased Activity Tolerance  3. Decreased Balance  4. Decreased Gait Ability  5. Decreased Strength  6. Decreased Transfer Abilities  7. Increased Pain   INTERVENTIONS PLANNED:   (Benefits and precautions of physical therapy have been discussed with the patient.)  1. Therapeutic Activity  2. Therapeutic Exercise/HEP  3. Neuromuscular Re-education  4. Gait Training  5. Manual Therapy  6. Education     TREATMENT:     EVALUATION: Low Complexity : (Untimed Charge)    TREATMENT:   ($$ Therapeutic Activity: 23-37 mins    )  Co-Treatment PT/OT necessary due to patient's decreased overall endurance/tolerance levels, as well as need for high level skilled assistance to complete functional transfers/mobility and functional tasks  Therapeutic Activity (23 Minutes): Therapeutic activity included Rolling, Supine to Sit, Scooting, Transfer Training, Ambulation on level ground, Sitting balance  and Standing balance to improve functional Mobility, Strength and Activity tolerance.     TREATMENT GRID:  N/A    AFTER TREATMENT POSITION/PRECAUTIONS:  Chair, Needs within reach, RN notified and Visitors at bedside    INTERDISCIPLINARY COLLABORATION:  RN/PCT, PT/PTA and OT/YOUNG    TOTAL TREATMENT DURATION:  PT Patient Time In/Time Out  Time In: 1008  Time Out: [address], PT, DPT

## 2022-03-17 ENCOUNTER — HOME CARE VISIT (OUTPATIENT)
Dept: SCHEDULING | Facility: HOME HEALTH | Age: 72
End: 2022-03-17
Payer: MEDICARE

## 2022-03-17 LAB
ERYTHROCYTE [DISTWIDTH] IN BLOOD BY AUTOMATED COUNT: 15.4 % (ref 11.9–14.6)
HCT VFR BLD AUTO: 29.8 % (ref 35.8–46.3)
HGB BLD-MCNC: 9.3 G/DL (ref 11.7–15.4)
MCH RBC QN AUTO: 30.7 PG (ref 26.1–32.9)
MCHC RBC AUTO-ENTMCNC: 31.2 G/DL (ref 31.4–35)
MCV RBC AUTO: 98.3 FL (ref 79.6–97.8)
NRBC # BLD: 0 K/UL (ref 0–0.2)
PLATELET # BLD AUTO: 240 K/UL (ref 150–450)
PMV BLD AUTO: 9.8 FL (ref 9.4–12.3)
RBC # BLD AUTO: 3.03 M/UL (ref 4.05–5.2)
WBC # BLD AUTO: 7.1 K/UL (ref 4.3–11.1)

## 2022-03-17 PROCEDURE — 99222 1ST HOSP IP/OBS MODERATE 55: CPT | Performed by: PHYSICIAN ASSISTANT

## 2022-03-17 PROCEDURE — 3331090001 HH PPS REVENUE CREDIT

## 2022-03-17 PROCEDURE — 36415 COLL VENOUS BLD VENIPUNCTURE: CPT

## 2022-03-17 PROCEDURE — 94640 AIRWAY INHALATION TREATMENT: CPT

## 2022-03-17 PROCEDURE — 3331090002 HH PPS REVENUE DEBIT

## 2022-03-17 PROCEDURE — 94760 N-INVAS EAR/PLS OXIMETRY 1: CPT

## 2022-03-17 PROCEDURE — 92523 SPEECH SOUND LANG COMPREHEN: CPT

## 2022-03-17 PROCEDURE — 99232 SBSQ HOSP IP/OBS MODERATE 35: CPT | Performed by: INTERNAL MEDICINE

## 2022-03-17 PROCEDURE — 77030040393 HC DRSG OPTIFOAM GENT MDII -B

## 2022-03-17 PROCEDURE — 85027 COMPLETE CBC AUTOMATED: CPT

## 2022-03-17 PROCEDURE — 77010033678 HC OXYGEN DAILY

## 2022-03-17 PROCEDURE — 74011636637 HC RX REV CODE- 636/637: Performed by: FAMILY MEDICINE

## 2022-03-17 PROCEDURE — 74011250637 HC RX REV CODE- 250/637: Performed by: FAMILY MEDICINE

## 2022-03-17 PROCEDURE — 99231 SBSQ HOSP IP/OBS SF/LOW 25: CPT | Performed by: PHYSICAL MEDICINE & REHABILITATION

## 2022-03-17 PROCEDURE — 65660000000 HC RM CCU STEPDOWN

## 2022-03-17 RX ADMIN — BUDESONIDE AND FORMOTEROL FUMARATE DIHYDRATE 2 PUFF: 160; 4.5 AEROSOL RESPIRATORY (INHALATION) at 18:35

## 2022-03-17 RX ADMIN — ATORVASTATIN CALCIUM 80 MG: 80 TABLET, FILM COATED ORAL at 21:18

## 2022-03-17 RX ADMIN — EZETIMIBE 10 MG: 10 TABLET ORAL at 08:59

## 2022-03-17 RX ADMIN — TIOTROPIUM BROMIDE INHALATION SPRAY 2 PUFF: 3.12 SPRAY, METERED RESPIRATORY (INHALATION) at 08:00

## 2022-03-17 RX ADMIN — LORAZEPAM 0.5 MG: 1 TABLET ORAL at 11:28

## 2022-03-17 RX ADMIN — PANTOPRAZOLE SODIUM 40 MG: 40 TABLET, DELAYED RELEASE ORAL at 05:26

## 2022-03-17 RX ADMIN — DILTIAZEM HYDROCHLORIDE 30 MG: 30 TABLET, FILM COATED ORAL at 11:28

## 2022-03-17 RX ADMIN — OXYCODONE 5 MG: 5 TABLET ORAL at 17:13

## 2022-03-17 RX ADMIN — METHIMAZOLE 10 MG: 10 TABLET ORAL at 08:58

## 2022-03-17 RX ADMIN — LORAZEPAM 0.5 MG: 1 TABLET ORAL at 21:30

## 2022-03-17 RX ADMIN — GABAPENTIN 100 MG: 100 CAPSULE ORAL at 17:13

## 2022-03-17 RX ADMIN — METOPROLOL SUCCINATE 100 MG: 25 TABLET, EXTENDED RELEASE ORAL at 08:59

## 2022-03-17 RX ADMIN — GABAPENTIN 100 MG: 100 CAPSULE ORAL at 21:18

## 2022-03-17 RX ADMIN — PREDNISONE 15 MG: 5 TABLET ORAL at 08:59

## 2022-03-17 RX ADMIN — ESCITALOPRAM OXALATE 10 MG: 10 TABLET ORAL at 09:00

## 2022-03-17 RX ADMIN — BUDESONIDE AND FORMOTEROL FUMARATE DIHYDRATE 2 PUFF: 160; 4.5 AEROSOL RESPIRATORY (INHALATION) at 08:00

## 2022-03-17 RX ADMIN — PANTOPRAZOLE SODIUM 40 MG: 40 TABLET, DELAYED RELEASE ORAL at 17:13

## 2022-03-17 RX ADMIN — GABAPENTIN 100 MG: 100 CAPSULE ORAL at 08:59

## 2022-03-17 RX ADMIN — OXYCODONE 5 MG: 5 TABLET ORAL at 22:46

## 2022-03-17 NOTE — PROGRESS NOTES
03/17/22 1910   NIH Stroke Scale   Interval Handoff/Transfer  (Dual [facility] w/ [facility] Orta, ALINE)   Level of Conciousness (1a) 0   LOC Questions (1b) 0   LOC Commands (1c) 0   Best Gaze (2) 0   Visual (3) 0   Facial Palsy (4) 0   Motor Arm, Left (5a) 0   Motor Arm, Right (5b) 0   Motor Leg, Left (6a) 0   Motor Leg, Right (6b) 0   Limb Ataxia (7) 0   Sensory (8) 0   Best Language (9) 0   Dysarthria (10) 0   Extinction and Inattention (11) 0   Total 0

## 2022-03-17 NOTE — CONSULTS
[id number] [facility]  Consultation Note    Patient ID:  Name: Teresa Nath  MRN: [id number]  AGE: 66 y.o.  : 1950    Date of Consultation:  2022  Referring Physician:  Hospitalist    Subjective: Pt complains of right hip pain after sustaining a ground level fall tripping over her oxygen tubing on 3/15 . They presented to the [facility] Emergency Dept. They were found to have a sub dural hematoma. They were admitted by the hospitalist.     During her admission she reported hip pain. X-rays were obtained that demonstrated an avulsion of the lesser trochanter. The patient had a right hip gamma nail placed by Dr. Cherylene Latus on 22. The patient has started PT/OT and is walking with assistnace. They have no other orthopedic concerns at this time.       Past Medical History Includes:   Past Medical History:   Diagnosis Date    Arrhythmia     Arthritis     CAD (coronary artery disease) , 2013    PCI,  Mario Laguerress     Carotid stenosis, right     endarterectomy 19    Chronic anxiety 2017    COPD     recent referral to [facility] for lung transplant    Depression 2021    Emphysema lung ([address]     GERD (gastroesophageal reflux disease)     controlled with medication    History of echocardiogram 2019    History of echocardiogram     echo 19 LVEF 55-60%    Hypertension     Nausea & vomiting     Oxygen dependent     1 liter at night    Pleurisy     Thyroid disease     pt denies   ,   Past Surgical History:   Procedure Laterality Date    HX GYN      rebuilt cervix    HX HEART CATHETERIZATION  2019    last stent- per patient- 10 stents total    HX OTHER SURGICAL      HX TONSIL AND ADENOIDECTOMY      VASCULAR SURGERY PROCEDURE UNLIST Right 2019    carotid endarterectomy     Family History:   Family History   Problem Relation Age of Onset    No Known Problems Mother adopted      Social History:   Social History     Tobacco Use    Smoking status: Former Smoker     Packs/day: 0.75     Years: 53.00     Pack years: 39.75     Types: Cigarettes     Quit date: 2018     Years since quittin.2    Smokeless tobacco: Never Used   Substance Use Topics    Alcohol use: Yes     Comment: rarely       ALLERGIES:   Allergies   Allergen Reactions    Promethazine Nausea and Vomiting and Other (comments)     Severe vomiting         Patient Medications    Current Facility-Administered Medications   Medication Dose Route Frequency    ondansetron (ZOFRAN) injection 4 mg  4 mg IntraVENous Q6H PRN    acetaminophen (TYLENOL) tablet 650 mg  650 mg Oral Q4H PRN    acetaminophen (TYLENOL) suppository 650 mg  650 mg Rectal Q4H PRN    senna-docusate (PERICOLACE) 8.6-50 mg per tablet 2 Tablet  2 Tablet Oral QHS    bisacodyL (DULCOLAX) tablet 5 mg  5 mg Oral DAILY PRN    labetaloL (NORMODYNE;TRANDATE) injection 20 mg  20 mg IntraVENous Q4H PRN    LORazepam (ATIVAN) tablet 0.5 mg  0.5 mg Oral Q8H PRN    metoprolol succinate (TOPROL-XL) XL tablet 100 mg  100 mg Oral DAILY    polyethylene glycol (MIRALAX) packet 17 g  17 g Oral DAILY    nitroglycerin (NITROSTAT) tablet 0.4 mg  0.4 mg SubLINGual PRN    traZODone (DESYREL) tablet 50 mg  50 mg Oral QHS PRN    ezetimibe (ZETIA) tablet 10 mg  10 mg Oral DAILY    albuterol-ipratropium (DUO-NEB) 2.5 MG-0.5 MG/3 ML  3 mL Nebulization Q4H PRN    tiotropium bromide (SPIRIVA RESPIMAT) 2.5 mcg /actuation  2 Puff Inhalation DAILY    budesonide-formoteroL (SYMBICORT) 160-4.5 mcg/actuation HFA inhaler 2 Puff  2 Puff Inhalation BID RT    atorvastatin (LIPITOR) tablet 80 mg  80 mg Oral QHS    escitalopram oxalate (LEXAPRO) tablet 10 mg  10 mg Oral DAILY    pantoprazole (PROTONIX) tablet 40 mg  40 mg Oral ACB&D    oxyCODONE IR (ROXICODONE) tablet 5 mg  5 mg Oral Q4H PRN    gabapentin (NEURONTIN) capsule 100 mg  100 mg Oral TID    dilTIAZem IR (CARDIZEM) tablet 30 mg  30 mg Oral Q6H    predniSONE (DELTASONE) tablet 15 mg  15 mg Oral DAILY WITH BREAKFAST    methIMAzole (TAPAZOLE) tablet 10 mg  10 mg Oral DAILY    azithromycin (ZITHROMAX) tablet 500 mg  500 mg Oral Q MON, WED & FRI    niCARdipine (CARDENE) 25 mg in 0.9% sodium chloride 250 mL ([id number])  5-15 mg/hr IntraVENous TITRATE         Review of Systems:  Pertinent items are noted in HPI. Physical Exam:      General: NAD, Alert, Oriented x 3   Mental Status: Appropriate   Psych: Normal Affect, Normal Mood    HEENT: Normal Cephalic/Atraumatic, PERRL   Lungs: Respirations even and unlabored, Breath Sounds were clear, no respiratory distress   Heart: Regular Rate and Rhythm   Vascular: Distal pulses intact, good capillary refill   Skin: No redness, No Rashes, Skin is dry   Musculoskeletal: tenderness over the lateral right hip. Fullness consistent with a hematoma. NV intact.  Calfs are soft and non-tender  Lymphatic: No lympahdenopathy, No distal edema   Neuro: No gross deficits   Abdomen: Soft, Non tender, No distension      VITALS:   Patient Vitals for the past 8 hrs:   BP Temp Pulse Resp SpO2   22 0803 121/60 97.6 °F (36.4 °C) 68 18 97 %   22 0800     (!) 87 %   22 0526   (!) 62     22 0400 (!) 127/57 97.3 °F (36.3 °C) 66 17 98 %    , Temp (24hrs), Av.7 °F (36.5 °C), Min:97.3 °F (36.3 °C), Max:98.1 °F (36.7 °C)           Diagnosis   Patient Active Problem List   Diagnosis Code    Personal history of tobacco use Z87.891    History of MI (myocardial infarction) I25.2    COPD, very severe (HCC) J44.9    Acute respiratory failure with hypoxia (HCC) J96.01    Hypoxemia R09.02    Hyperlipidemia E78.5    Essential hypertension, benign I10    Coronary artery disease involving native coronary artery of native heart without angina pectoris I25.10    Chronic anxiety F41.9    Ventricular ectopy I49.3    Chest pain R07.9    CAD S/P percutaneous coronary angioplasty I25.10, Z98.61    Hypertensive urgency I16.0    Chronic respiratory failure with hypoxia ([facility]) J96.11    Carotid stenosis I65.29    Carotid stenosis, right I65.21    Centrilobular emphysema ([facility]) J43.2    Pulmonary mass R91.8    H/O carotid endarterectomy Z98.890    Right ear pain H92.01    Ischemic heart disease, hx pci, cath/pci last 5/2019 I25.9    Palliative care patient Z51.5    DNR (do not resuscitate) Z66    Acute bilateral low back pain without sciatica M54.50    Closed right hip fracture ([facility]) S72.001A    Other fracture of right femur, initial encounter for closed fracture ([facility] S72.8X1A    Moderate to severe mitral regurgitation I34.0    Iron deficiency anemia due to chronic blood loss D50.0    COPD with acute lower respiratory infection ([facility]) J44.0    Femur fracture, right ([facility]) S72.91XA    EKG, abnormal R94.31    Post-traumatic subdural hematoma ([facility]) S06.5X9A    Hypokalemic alkalosis E87.3    Multiple closed anterior-posterior compression fractures of pelvis ([facility]) S32.82XA    Depression F32. A    DDD (degenerative disc disease), lumbar M51.36    Anxiety F41.9          Assessment      Right hip hematoma   Avulsion fracture of the lesser trochanter of the right hip  Hx of recent right hip gamma nail    Medical Decision Making:     X-rays and chart have been reviewed. The patient has a hematoma of the right hip and  Avulsion fracture of the lesser trochanter of the right hip. This is a stable fracture and she may be WBAT with walker and assistance. She may ice right hip as needed. Her pain is under control. We will see her for follow up in 2 weeks in the office or sooner if needed.            REKHA Shah  3/17/2022,  9:26 AM

## 2022-03-17 NOTE — PROGRESS NOTES
03/16/22 2110   NIH Stroke Scale   Interval Handoff/Transfer  (Dual [facility] with ALINE Hernandez )   Level of Conciousness (1a) 0   LOC Questions (1b) 0   LOC Commands (1c) 0   Best Gaze (2) 0   Visual (3) 0   Facial Palsy (4) 0   Motor Arm, Left (5a) 0   Motor Arm, Right (5b) 0   Motor Leg, Left (6a) 0   Motor Leg, Right (6b) 0   Limb Ataxia (7) 0   Sensory (8) 0   Best Language (9) 0   Dysarthria (10) 0   Extinction and Inattention (11) 0   Total 0

## 2022-03-17 NOTE — PROGRESS NOTES
Ismael Camargo MD  Medical Director  [address] [facility], [address] [facility]  Tel: [phone]       Physical Medicine & Rehab Consult Progress Note      Patient: Jere Ordoñez  Admit Date: 3/15/2022  Admit Diagnosis: Subdural hemorrhage following injury ([facility] [address] [S06.5X9A]  SDH s/p fall  Recommendations:   Hospital Inpatient Rehab, Continue acute rehabilitation program, Coordination of rehab / medical care, Counseling of PM&R care issues management  -[facility] admission Friday 3/18 MORNING. -no plans for any surgical intervention of the avulsion of the right lesser trochanter . Remains WBAT  -neurologically intact  -do not anticipate a long rehab stay    History / Subjective / Complaint:     Patient seen and examined, interim EMR reviewed. Pt asleep when I entered. Was talking in her sleep and waving. When she awoke, she stated that she was dreaming that she was sitting at a table that her family was at and she was greeting them.  Pt somewhat confused upon awakening but oriented  Allergies   Allergen Reactions    Promethazine Nausea and Vomiting and Other (comments)     Severe vomiting      Current Facility-Administered Medications   Medication Dose Route Frequency    ondansetron (ZOFRAN) injection 4 mg  4 mg IntraVENous Q6H PRN    acetaminophen (TYLENOL) tablet 650 mg  650 mg Oral Q4H PRN    acetaminophen (TYLENOL) suppository 650 mg  650 mg Rectal Q4H PRN    senna-docusate (PERICOLACE) 8.6-50 mg per tablet 2 Tablet  2 Tablet Oral QHS    bisacodyL (DULCOLAX) tablet 5 mg  5 mg Oral DAILY PRN    labetaloL (NORMODYNE;TRANDATE) injection 20 mg  20 mg IntraVENous Q4H PRN    LORazepam (ATIVAN) tablet 0.5 mg  0.5 mg Oral Q8H PRN    metoprolol succinate (TOPROL-XL) XL tablet 100 mg  100 mg Oral DAILY    polyethylene glycol (MIRALAX) packet 17 g  17 g Oral DAILY    nitroglycerin (NITROSTAT) tablet 0.4 mg  0.4 mg SubLINGual PRN    traZODone (DESYREL) tablet 50 mg  50 mg Oral QHS PRN    ezetimibe (ZETIA) tablet 10 mg  10 mg Oral DAILY    albuterol-ipratropium (DUO-NEB) 2.5 MG-0.5 MG/3 ML  3 mL Nebulization Q4H PRN    tiotropium bromide (SPIRIVA RESPIMAT) 2.5 mcg /actuation  2 Puff Inhalation DAILY    budesonide-formoteroL (SYMBICORT) 160-4.5 mcg/actuation HFA inhaler 2 Puff  2 Puff Inhalation BID RT    atorvastatin (LIPITOR) tablet 80 mg  80 mg Oral QHS    escitalopram oxalate (LEXAPRO) tablet 10 mg  10 mg Oral DAILY    pantoprazole (PROTONIX) tablet 40 mg  40 mg Oral ACB&D    oxyCODONE IR (ROXICODONE) tablet 5 mg  5 mg Oral Q4H PRN    gabapentin (NEURONTIN) capsule 100 mg  100 mg Oral TID    dilTIAZem IR (CARDIZEM) tablet 30 mg  30 mg Oral Q6H    predniSONE (DELTASONE) tablet 15 mg  15 mg Oral DAILY WITH BREAKFAST    methIMAzole (TAPAZOLE) tablet 10 mg  10 mg Oral DAILY    azithromycin (ZITHROMAX) tablet 500 mg  500 mg Oral Q MON, WED & FRI       Objective:     Vitals:  Patient Vitals for the past 8 hrs:   BP Temp Pulse Resp SpO2   03/17/22 1600 113/60 98 °F (36.7 °C) 72 18 96 %   03/17/22 1200 (!) 112/54 97.3 °F (36.3 °C) 67 18 98 %   03/17/22 1140    18 98 %      Intake and Output:  03/15 1901 - 03/17 0700  In: 8905 [P.O.:685; I.V.:1050]  Out: -     Physical Exam:  No significant changes    Incision(s)/Wound(s):   Incision 02/04/22 Leg Right (Active)   Dressing/Treatment Steri-strips 03/17/22 0800   Closure Steri-strips 03/15/22 1005   Margins Approximated 03/15/22 1005   Drainage Amount None 03/15/22 1005   Wound Odor None 03/15/22 1005   Number of days: 41          Pain 1  Pain Scale 1: Numeric (0 - 10) (03/17/22 1713)  Pain Intensity 1: 4 (03/17/22 1713)  Patient Stated Pain Goal: 0 (03/17/22 1713)  Pain Reassessment 1: Yes (03/17/22 0038)  Pain Onset 1: chronic/acute (03/17/22 1713)  Pain Location 1: Back;Hip (03/17/22 1713)  Pain Orientation 1: Right (03/17/22 1713)  Pain Description 1: Aching (03/17/22 1713)  Pain Intervention(s) 1: Medication (see MAR) (03/17/22 6785)     Functional Assessment:                                                  Labs/Studies:  Recent Results (from the past 72 hour(s))   CBC WITH AUTOMATED DIFF    Collection Time: 03/15/22  3:02 PM   Result Value Ref Range    WBC 8.8 4.3 - 11.1 K/uL    RBC 3.47 (L) 4.05 - 5.2 M/uL    HGB 10.6 (L) 11.7 - 15.4 g/dL    HCT 33.6 (L) 35.8 - 46.3 %    MCV 96.8 79.6 - 97.8 FL    MCH 30.5 26.1 - 32.9 PG    MCHC 31.5 31.4 - 35.0 g/dL    RDW 15.5 (H) 11.9 - 14.6 %    PLATELET 065 035 - 338 K/uL    MPV 9.7 9.4 - 12.3 FL    ABSOLUTE NRBC 0.00 0.0 - 0.2 K/uL    DF AUTOMATED      NEUTROPHILS 89 (H) 43 - 78 %    LYMPHOCYTES 7 (L) 13 - 44 %    MONOCYTES 4 4.0 - 12.0 %    EOSINOPHILS 0 (L) 0.5 - 7.8 %    BASOPHILS 0 0.0 - 2.0 %    IMMATURE GRANULOCYTES 1 0.0 - 5.0 %    ABS. NEUTROPHILS 7.9 1.7 - 8.2 K/UL    ABS. LYMPHOCYTES 0.6 0.5 - 4.6 K/UL    ABS. MONOCYTES 0.3 0.1 - 1.3 K/UL    ABS. EOSINOPHILS 0.0 0.0 - 0.8 K/UL    ABS. BASOPHILS 0.0 0.0 - 0.2 K/UL    ABS. IMM. GRANS. 0.0 0.0 - 0.5 K/UL   METABOLIC PANEL, COMPREHENSIVE    Collection Time: 03/15/22  3:02 PM   Result Value Ref Range    Sodium 138 136 - 145 mmol/L    Potassium 2.6 (L) 3.5 - 5.1 mmol/L    Chloride 97 (L) 98 - 107 mmol/L    CO2 34 (H) 21 - 32 mmol/L    Anion gap 7 7 - 16 mmol/L    Glucose 112 (H) 65 - 100 mg/dL    BUN 13 8 - 23 MG/DL    Creatinine 0.90 0.6 - 1.0 MG/DL    GFR est AA >60 >60 ml/min/1.73m2    GFR est non-AA >60 >60 ml/min/1.73m2    Calcium 9.4 8.3 - 10.4 MG/DL    Bilirubin, total 0.6 0.2 - 1.1 MG/DL    ALT (SGPT) 22 12 - 65 U/L    AST (SGOT) 18 15 - 37 U/L    Alk.  phosphatase 136 50 - 136 U/L    Protein, total 7.4 6.3 - 8.2 g/dL    Albumin 3.5 3.2 - 4.6 g/dL    Globulin 3.9 (H) 2.3 - 3.5 g/dL    A-G Ratio 0.9 (L) 1.2 - 3.5     PROTHROMBIN TIME + INR    Collection Time: 03/15/22  3:02 PM   Result Value Ref Range    Prothrombin time 13.0 12.6 - 14.5 sec    INR 1.0     PTT    Collection Time: 03/15/22  3:02 PM   Result Value Ref Range    aPTT 27.8 24.1 - 35.1 SEC   MAGNESIUM    Collection Time: 03/15/22  3:02 PM   Result Value Ref Range    Magnesium 1.8 1.8 - 2.4 mg/dL   CBC W/O DIFF    Collection Time: 03/15/22  7:13 PM   Result Value Ref Range    WBC 11.3 (H) 4.3 - 11.1 K/uL    RBC 3.23 (L) 4.05 - 5.2 M/uL    HGB 9.9 (L) 11.7 - 15.4 g/dL    HCT 31.5 (L) 35.8 - 46.3 %    MCV 97.5 79.6 - 97.8 FL    MCH 30.7 26.1 - 32.9 PG    MCHC 31.4 31.4 - 35.0 g/dL    RDW 15.4 (H) 11.9 - 14.6 %    PLATELET 279 655 - 886 K/uL    MPV 9.4 9.4 - 12.3 FL    ABSOLUTE NRBC 0.00 0.0 - 0.2 K/uL   METABOLIC PANEL, BASIC    Collection Time: 03/16/22 12:15 PM   Result Value Ref Range    Sodium 137 136 - 145 mmol/L    Potassium 5.0 3.5 - 5.1 mmol/L    Chloride 105 98 - 107 mmol/L    CO2 27 21 - 32 mmol/L    Anion gap 5 (L) 7 - 16 mmol/L    Glucose 117 (H) 65 - 100 mg/dL    BUN 12 8 - 23 MG/DL    Creatinine 0.70 0.6 - 1.0 MG/DL    GFR est AA >60 >60 ml/min/1.73m2    GFR est non-AA >60 >60 ml/min/1.73m2    Calcium 9.3 8.3 - 10.4 MG/DL   MAGNESIUM    Collection Time: 03/16/22 12:15 PM   Result Value Ref Range    Magnesium 2.6 (H) 1.8 - 2.4 mg/dL   TSH 3RD GENERATION    Collection Time: 03/16/22 12:15 PM   Result Value Ref Range    TSH 0.017 (L) 0.358 - 3.740 uIU/mL   CBC W/O DIFF    Collection Time: 03/16/22  8:17 PM   Result Value Ref Range    WBC 5.9 4.3 - 11.1 K/uL    RBC 3.07 (L) 4.05 - 5.2 M/uL    HGB 9.4 (L) 11.7 - 15.4 g/dL    HCT 30.6 (L) 35.8 - 46.3 %    MCV 99.7 (H) 79.6 - 97.8 FL    MCH 30.6 26.1 - 32.9 PG    MCHC 30.7 (L) 31.4 - 35.0 g/dL    RDW 15.6 (H) 11.9 - 14.6 %    PLATELET 380 676 - 532 K/uL    MPV 9.9 9.4 - 12.3 FL    ABSOLUTE NRBC 0.00 0.0 - 0.2 K/uL        Assessment:     Principal Problem:    Post-traumatic subdural hematoma (HCC) (3/15/2022)    Active Problems:    COPD, very severe (HCC) (8/20/2013)      CAD S/P percutaneous coronary angioplasty (5/31/2019)      Chronic respiratory failure with hypoxia ([facility] [address] (6/8/2019) Centrilobular emphysema ([facility] [address] (12/20/2019)      DNR (do not resuscitate) (11/9/2021)      Overview: POST form completed - DNR but full treatment, including intubation. No       TRACH. No PEG.        Closed right hip fracture ([facility] [address] (2/4/2022)      Hypokalemic alkalosis (3/15/2022)        Plan / Recommendations / Medical Decision Making:     Recommendations:   Continue acute rehabilitation program  Coordination of rehab / medical care  Counseling of PM&R care issues management  Monitoring and management of medical conditions per plan of care / orders    Discussion with Family / Caregiver / Staff    Reviewed Therapies / Nicole Busby / Medications / Records

## 2022-03-17 NOTE — PROGRESS NOTES
Problem: Falls - Risk of  Goal: *Absence of Falls  Description: Document Darmirtha Columbia Fall Risk and appropriate interventions in the flowsheet.   Outcome: Progressing Towards Goal  Note: Fall Risk Interventions:  Mobility Interventions: Bed/chair exit alarm,OT consult for ADLs,Patient to call before getting OOB,PT Consult for mobility concerns         Medication Interventions: Bed/chair exit alarm,Evaluate medications/consider consulting pharmacy,Patient to call before getting OOB,Teach patient to arise slowly    Elimination Interventions: Call light in reach,Bed/chair exit alarm,Patient to call for help with toileting needs,Stay With Me (per policy)    History of Falls Interventions: Bed/chair exit alarm,Door open when patient unattended,Investigate reason for fall         Problem: Patient Education: Go to Patient Education Activity  Goal: Patient/Family Education  Outcome: Progressing Towards Goal     Problem: Patient Education: Go to Patient Education Activity  Goal: Patient/Family Education  Outcome: Progressing Towards Goal     Problem: TIA/CVA Stroke: 0-24 hours  Goal: Activity/Safety  Outcome: Progressing Towards Goal  Goal: Consults, if ordered  Outcome: Progressing Towards Goal  Goal: Diagnostic Test/Procedures  Outcome: Progressing Towards Goal  Goal: Nutrition/Diet  Outcome: Progressing Towards Goal  Goal: Discharge Planning  Outcome: Progressing Towards Goal  Goal: Medications  Outcome: Progressing Towards Goal  Goal: Respiratory  Outcome: Progressing Towards Goal  Goal: Treatments/Interventions/Procedures  Outcome: Progressing Towards Goal  Goal: Minimize risk of bleeding post-thrombolytic infusion  Outcome: Progressing Towards Goal  Goal: Monitor for complications post-thrombolytic infusion  Outcome: Progressing Towards Goal  Goal: Psychosocial  Outcome: Progressing Towards Goal  Goal: *Hemodynamically stable  Outcome: Progressing Towards Goal  Goal: *Neurologically stable  Description: Absence of additional neurological deficits    Outcome: Progressing Towards Goal  Goal: *Verbalizes anxiety and depression are reduced or absent  Outcome: Progressing Towards Goal  Goal: *Absence of Signs of Aspiration on Current Diet  Outcome: Progressing Towards Goal  Goal: *Absence of deep venous thrombosis signs and symptoms(Stroke Metric)  Outcome: Progressing Towards Goal  Goal: *Ability to perform ADLs and demonstrates progressive mobility and function  Outcome: Progressing Towards Goal  Goal: *Stroke education started(Stroke Metric)  Outcome: Progressing Towards Goal  Goal: *Dysphagia screen performed(Stroke Metric)  Outcome: Progressing Towards Goal  Goal: *Rehab consulted(Stroke Metric)  Outcome: Progressing Towards Goal

## 2022-03-17 NOTE — PROGRESS NOTES
Breanna [facility] CRITICAL CARE OUTREACH NURSE PROGRESS REPORT      SUBJECTIVE: Called to assess patient secondary to transfer from critical care. MEWS Score: 1 (03/17/22 0000)  Vitals:    03/16/22 2000 03/16/22 2030 03/16/22 2116 03/17/22 0000   BP: (!) 122/59 (!) 105/53 127/60 134/60   Pulse: 61 (!) 58 65 72   Resp: 16 16 18 19   Temp:   97.6 °F (36.4 °C) 97.7 °F (36.5 °C)   SpO2: 99% 100% 98% 99%   Weight:       Height:          LAB DATA:    Recent Labs     03/16/22  1215 03/15/22  1502    138   K 5.0 2.6*    97*   CO2 27 34*   AGAP 5* 7   * 112*   BUN 12 13   CREA 0.70 0.90   GFRAA >60 >60   GFRNA >60 >60   CA 9.3 9.4   MG 2.6* 1.8   ALB  --  3.5   TP  --  7.4   GLOB  --  3.9*   AGRAT  --  0.9*   ALT  --  22        Recent Labs     03/16/22  2017 03/15/22  1913 03/15/22  1502   WBC 5.9 11.3* 8.8   HGB 9.4* 9.9* 10.6*   HCT 30.6* 31.5* 33.6*    245 272          OBJECTIVE: On arrival to room, I found patient to be in bed, watching television. ASSESSMENT:  Pt alert and oriented, denies any changes in vision or sensation. NIH 0. C/O periodic h/a but states that her PRN pain medicine gives her relief. VS, labs, and progress notes reviewed. Primary RN without any additional needs/concerns at this time. PLAN:  Will continue to follow per outreach protocol.

## 2022-03-17 NOTE — PROGRESS NOTES
03/16/22 2110   Dual Skin Pressure Injury Assessment   Dual Skin Pressure Injury Assessment X   Second Care Provider (Based on [address] [place] [address]) Kathy Kidd RN    Buttocks/Ishium  Bilateral   Skin Integumentary   Skin Integumentary (WDL) X    Pressure  Injury Documentation Pressure Injury Noted-See Wound LDA to Document   Skin Color Appropriate for ethnicity; Ecchymosis (comment); Red  (R heel; sacrum )   Skin Condition/Temp Warm;Dry;Flaky   Skin Integrity Abrasion; Excoriation  (R elbow; sacrum )   Wound Prevention and Protection Methods   Orientation of Wound Prevention Posterior   Location of Wound Prevention Sacrum/Coccyx   Dressing Present  Yes   Dressing Status Changed   Wound Offloading (Prevention Methods) Bed, pressure reduction mattress;Repositioning;Pillows; Foam silicone     Pt's right heel with small red area, covered with mepilex. R elbow covered with gauze and hydrocolloid. Sacrum reddened with excoriation, peeling bilaterally.

## 2022-03-17 NOTE — PROGRESS NOTES
Bedside and verbal shift change report received from  [facility], [facility] (offgoing nurse). Report included the following information SBAR, Kardex, ED Summary, Procedure Summary, Intake/Output, MAR, Recent Results, Med Rec Status, Cardiac Rhythm NSR, Alarm Parameters , Pre Procedure Checklist, Procedure Verification, Quality Measures and Dual Neuro Assessment.      Dual skin assessment completed at bedside: right heel blanchable redness and right elbow abrasion (list pertinent skin assessment findings)    Dual verification of gtts completed (name of gtts verified): NA

## 2022-03-17 NOTE — PROGRESS NOTES
TRANSFER - IN REPORT:    Verbal report received from ALINE Hernandez (name) on Andre Wood  being received from ICU (unit) for routine progression of care      Report consisted of patients Situation, Background, Assessment and   Recommendations(SBAR). Information from the following report(s) SBAR, Kardex, Intake/Output, MAR and Recent Results was reviewed with the receiving nurse. Opportunity for questions and clarification was provided. Assessment to be completed upon patients arrival to unit and care assumed.

## 2022-03-17 NOTE — PROGRESS NOTES
Hospitalist Progress Note   Admit Date:  3/15/2022  2:01 PM   Name:  Pernell Begum   Age:  66 y.o. Sex:  female  :  1950   MRN:  [id number]   Room:  [facility]/    Presenting Complaint: Fall (Pt reports fall today at home on hardwood, tripped over 02 cord. Hit head. Denies any LOC. AAOX4. PERRLA. Pain to R Hip. No bleeding. Pt does take blood thinner. )    Reason(s) for Admission: Subdural hemorrhage following injury [facility]) [place] [facility] Course & Interval History:   Ms. Loki Allen is a 67 yo female with PMH of CAD on asa, effient ([facility] with stent/ PCI ), right hip fracture repair, COPD on home O2 at 1 L NC, admitted s/p trip over O2 tubing and fall. CT Head WO Contrast demonstrated a small left temporal convexity subdural hematoma on the right without any evidence of brain compression or mid-line shift. Hip XR showing postoperative changes from repair of subacute right hip fracture and medial displacement of lesser trochanter. Labs: K 2.6 HCO3 34 Scr 0.9 INR 1.0. rec'd 40 meq IV kcl in ED       Subjective/24hr Events (22): Denies HA, visual disturbances, dizziness, N/V, AMS, slurred speech     ROS:  10 systems reviewed and negative except as noted above. Assessment & Plan:     Principal Problem:  Post-traumatic subdural hematoma ([address] (3/15/2022)  3/17/22  -CT Head with unchanged thin right subdural hematoma, with no mass effect or midline shift. 3/16/22  -Continue holding blood thinners, plavix for now.  Reintroduce per neurosurgery  -Neurosurgery following; appreciate assistance   - neurochekcs   - head of bed at least 30 degrees to decrease arterial pressure and promote venous drainage    Active Problems:  CAD S/P percutaneous coronary angioplasty (2019)  Recent non-STEMI   Severe MR  3/17/22  -Recent balloon dilatation to the distal right coronary and carotid bifurcation stenting with synergy drug-eluting stents to the LAD/diagonal bifurcation 5 weeks ago -Cardiology following   -Continue statin/BB; holding plavix/ASA pending neurosurgery recs    Chronic respiratory failure with hypoxia ([facility] (6/8/2019)  -On 2L     Centrilobular emphysema ([facility] (12/20/2019)  Lung Mass  -Stable; Continue home meds   -Follows Dr. Irvin Canales           Closed right hip fracture ([facility] [facility] (2/4/2022)  3/17/22  -S/P repair 2/2022  -Stable avulsion of the lesser trochanter on XR 3/15/22   -WBAT with walker for assistance    Hypertension  -Stable; continue current meds     Debility   3/17/22  -Therapy with recs for IP rehab     Hypothyroid  -Methimazole           Dispo/Discharge Planning:      TBD    Diet:  ADULT DIET Regular  DVT PPx: SCD  Code status: DNR    Hospital Problems as of 3/17/2022 Date Reviewed: 2/24/2022          Codes Class Noted - Resolved POA    * (Principal) Post-traumatic subdural hematoma ([facility] [address] ICD-10-CM: M22.5K9N  ICD-9-CM: 852.20  3/15/2022 - Present Yes        Hypokalemic alkalosis ICD-10-CM: E87.3  ICD-9-CM: 276.3  3/15/2022 - Present Yes        Closed right hip fracture ([facility] ICD-10-CM: S72.001A  ICD-9-CM: 820.8  2/4/2022 - Present Yes        DNR (do not resuscitate) ICD-10-CM: Z66  ICD-9-CM: V49.86  11/9/2021 - Present Yes    Overview Signed 11/9/2021 12:25 PM by Paty Mortensen NP     POST form completed - DNR but full treatment, including intubation. No TRACH. No PEG.               Centrilobular emphysema ([facility] ICD-10-CM: J43.2  ICD-9-CM: 492.8  12/20/2019 - Present Yes        Chronic respiratory failure with hypoxia (HCC) (Chronic) ICD-10-CM: J96.11  ICD-9-CM: 518.83, 799.02  6/8/2019 - Present Yes        CAD S/P percutaneous coronary angioplasty ICD-10-CM: I25.10, Z98.61  ICD-9-CM: 414.01, V45.82  5/31/2019 - Present Yes        COPD, very severe ([facility] (Chronic) ICD-10-CM: J44.9  ICD-9-CM: 496  8/20/2013 - Present Yes              Objective:     Patient Vitals for the past 24 hrs:   Temp Pulse Resp BP SpO2   03/17/22 1200 97.3 °F (36.3 °C) 67 18 (!) 112/54 98 %   03/17/22 2000 [facility] 18  98 %   03/17/22 0800 97.6 °F (36.4 °C) 68 18 121/60 97 %   03/17/22 0526  (!) 57      03/17/22 0400 97.3 °F (36.3 °C) 66 17 (!) 127/57 98 %   03/17/22 0000 97.7 °F (36.5 °C) 72 19 134/60 99 %   03/16/22 2116 97.6 °F (36.4 °C) 65 18 127/60 98 %   03/16/22 2030  (!) 58 16 (!) 105/53 100 %   03/16/22 2000  61 16 (!) 122/59 99 %   03/16/22 1930  66 17 (!) 107/53 99 %   03/16/22 1920     100 %   03/16/22 1900 98 °F (36.7 °C) 60 (!) 31 (!) 109/55 100 %   03/16/22 1830  62 (!) 31 (!) 117/56 100 %   03/16/22 1815  71 (!) 33  99 %   03/16/22 1800  73 (!) 41 (!) 143/64 98 %   03/16/22 1745  69 (!) 34  100 %   03/16/22 1730  (!) 55 27 (!) 117/56 100 %   03/16/22 1729  (!) 54  (!) 116/55    03/16/22 1715  (!) 55 25  100 %   03/16/22 1700  (!) 53 22 (!) 116/58 100 %   03/16/22 1645  (!) 57 26  100 %   03/16/22 1630  (!) 59 22 (!) 116/58 100 %   03/16/22 1615  64 27  100 %   03/16/22 1600  (!) 57 22 (!) 123/58 100 %   03/16/22 1545  68 20  98 %   03/16/22 1530  60 20 (!) 104/52 100 %   03/16/22 1515  (!) 58 20  100 %   03/16/22 1500 97.5 °F (36.4 °C) 63 26 (!) 106/53 96 %     Oxygen Therapy  O2 Sat (%): 98 % (03/17/22 1200)  Pulse via Oximetry: 70 beats per minute (03/17/22 0800)  O2 Device: Nasal cannula (03/17/22 1140)  Skin Assessment: Clean, dry, & intact (03/16/22 1900)  Skin Protection for O2 Device: N/A (03/16/22 1900)  O2 Flow Rate (L/min): 2 l/min (weaned to 1L) (03/17/22 1140)    Estimated body mass index is 20.63 kg/m² as calculated from the following:    Height as of this encounter: 5' 8\" (1.727 m). Weight as of this encounter: 61.6 kg (135 lb 11.2 oz). Intake/Output Summary (Last 24 hours) at 3/17/2022 1446  Last data filed at 3/16/2022 1705  Gross per 24 hour   Intake 453.75 ml   Output    Net 453.75 ml         Physical Exam:     Blood pressure (!) 112/54, pulse 67, temperature 97.3 °F (36.3 °C), resp.  rate 18, height 5' 8\" (1.727 m), weight 61.6 kg (135 lb 11.2 oz), SpO2 98 %. General:    Well nourished. No overt distress  Head:  Normocephalic, atraumatic  Eyes:  Sclerae appear normal.  Pupils equally round. ENT:  Nares appear normal, no drainage. Moist oral mucosa  Neck:  No restricted ROM. Trachea midline   CV:   RRR. No m/r/g. No jugular venous distension. Lungs:   CTAB. No wheezing, rhonchi, or rales. Respirations even, unlabored  Abdomen: Bowel sounds present. Soft, nontender, nondistended. Extremities: No cyanosis or clubbing. No edema  Skin:     No rashes and normal coloration. Warm and dry. Neuro:  CN II-XII grossly intact. A&Ox3  Psych:  Normal mood and affect. I have reviewed ordered lab tests and independently visualized imaging below:    Recent Labs:  Recent Results (from the past 48 hour(s))   CBC WITH AUTOMATED DIFF    Collection Time: 03/15/22  3:02 PM   Result Value Ref Range    WBC 8.8 4.3 - 11.1 K/uL    RBC 3.47 (L) 4.05 - 5.2 M/uL    HGB 10.6 (L) 11.7 - 15.4 g/dL    HCT 33.6 (L) 35.8 - 46.3 %    MCV 96.8 79.6 - 97.8 FL    MCH 30.5 26.1 - 32.9 PG    MCHC 31.5 31.4 - 35.0 g/dL    RDW 15.5 (H) 11.9 - 14.6 %    PLATELET 774 807 - 446 K/uL    MPV 9.7 9.4 - 12.3 FL    ABSOLUTE NRBC 0.00 0.0 - 0.2 K/uL    DF AUTOMATED      NEUTROPHILS 89 (H) 43 - 78 %    LYMPHOCYTES 7 (L) 13 - 44 %    MONOCYTES 4 4.0 - 12.0 %    EOSINOPHILS 0 (L) 0.5 - 7.8 %    BASOPHILS 0 0.0 - 2.0 %    IMMATURE GRANULOCYTES 1 0.0 - 5.0 %    ABS. NEUTROPHILS 7.9 1.7 - 8.2 K/UL    ABS. LYMPHOCYTES 0.6 0.5 - 4.6 K/UL    ABS. MONOCYTES 0.3 0.1 - 1.3 K/UL    ABS. EOSINOPHILS 0.0 0.0 - 0.8 K/UL    ABS. BASOPHILS 0.0 0.0 - 0.2 K/UL    ABS. IMM.  GRANS. 0.0 0.0 - 0.5 K/UL   METABOLIC PANEL, COMPREHENSIVE    Collection Time: 03/15/22  3:02 PM   Result Value Ref Range    Sodium 138 136 - 145 mmol/L    Potassium 2.6 (L) 3.5 - 5.1 mmol/L    Chloride 97 (L) 98 - 107 mmol/L    CO2 34 (H) 21 - 32 mmol/L    Anion gap 7 7 - 16 mmol/L    Glucose 112 (H) 65 - 100 mg/dL    BUN 13 8 - 23 MG/DL    Creatinine 0.90 0.6 - 1.0 MG/DL    GFR est AA >60 >60 ml/min/1.73m2    GFR est non-AA >60 >60 ml/min/1.73m2    Calcium 9.4 8.3 - 10.4 MG/DL    Bilirubin, total 0.6 0.2 - 1.1 MG/DL    ALT (SGPT) 22 12 - 65 U/L    AST (SGOT) 18 15 - 37 U/L    Alk.  phosphatase 136 50 - 136 U/L    Protein, total 7.4 6.3 - 8.2 g/dL    Albumin 3.5 3.2 - 4.6 g/dL    Globulin 3.9 (H) 2.3 - 3.5 g/dL    A-G Ratio 0.9 (L) 1.2 - 3.5     PROTHROMBIN TIME + INR    Collection Time: 03/15/22  3:02 PM   Result Value Ref Range    Prothrombin time 13.0 12.6 - 14.5 sec    INR 1.0     PTT    Collection Time: 03/15/22  3:02 PM   Result Value Ref Range    aPTT 27.8 24.1 - 35.1 SEC   MAGNESIUM    Collection Time: 03/15/22  3:02 PM   Result Value Ref Range    Magnesium 1.8 1.8 - 2.4 mg/dL   CBC W/O DIFF    Collection Time: 03/15/22  7:13 PM   Result Value Ref Range    WBC 11.3 (H) 4.3 - 11.1 K/uL    RBC 3.23 (L) 4.05 - 5.2 M/uL    HGB 9.9 (L) 11.7 - 15.4 g/dL    HCT 31.5 (L) 35.8 - 46.3 %    MCV 97.5 79.6 - 97.8 FL    MCH 30.7 26.1 - 32.9 PG    MCHC 31.4 31.4 - 35.0 g/dL    RDW 15.4 (H) 11.9 - 14.6 %    PLATELET 016 169 - 232 K/uL    MPV 9.4 9.4 - 12.3 FL    ABSOLUTE NRBC 0.00 0.0 - 0.2 K/uL   METABOLIC PANEL, BASIC    Collection Time: 03/16/22 12:15 PM   Result Value Ref Range    Sodium 137 136 - 145 mmol/L    Potassium 5.0 3.5 - 5.1 mmol/L    Chloride 105 98 - 107 mmol/L    CO2 27 21 - 32 mmol/L    Anion gap 5 (L) 7 - 16 mmol/L    Glucose 117 (H) 65 - 100 mg/dL    BUN 12 8 - 23 MG/DL    Creatinine 0.70 0.6 - 1.0 MG/DL    GFR est AA >60 >60 ml/min/1.73m2    GFR est non-AA >60 >60 ml/min/1.73m2    Calcium 9.3 8.3 - 10.4 MG/DL   MAGNESIUM    Collection Time: 03/16/22 12:15 PM   Result Value Ref Range    Magnesium 2.6 (H) 1.8 - 2.4 mg/dL   TSH 3RD GENERATION    Collection Time: 03/16/22 12:15 PM   Result Value Ref Range    TSH 0.017 (L) 0.358 - 3.740 uIU/mL   CBC W/O DIFF    Collection Time: 03/16/22  8:17 PM   Result Value Ref Range    WBC 5.9 4.3 - 11.1 K/uL    RBC 3.07 (L) 4.05 - 5.2 M/uL    HGB 9.4 (L) 11.7 - 15.4 g/dL    HCT 30.6 (L) 35.8 - 46.3 %    MCV 99.7 (H) 79.6 - 97.8 FL    MCH 30.6 26.1 - 32.9 PG    MCHC 30.7 (L) 31.4 - 35.0 g/dL    RDW 15.6 (H) 11.9 - 14.6 %    PLATELET 374 471 - 048 K/uL    MPV 9.9 9.4 - 12.3 FL    ABSOLUTE NRBC 0.00 0.0 - 0.2 K/uL       All Micro Results     None          Other Studies:  No results found.     Current Meds:  Current Facility-Administered Medications   Medication Dose Route Frequency    ondansetron (ZOFRAN) injection 4 mg  4 mg IntraVENous Q6H PRN    acetaminophen (TYLENOL) tablet 650 mg  650 mg Oral Q4H PRN    acetaminophen (TYLENOL) suppository 650 mg  650 mg Rectal Q4H PRN    senna-docusate (PERICOLACE) 8.6-50 mg per tablet 2 Tablet  2 Tablet Oral QHS    bisacodyL (DULCOLAX) tablet 5 mg  5 mg Oral DAILY PRN    labetaloL (NORMODYNE;TRANDATE) injection 20 mg  20 mg IntraVENous Q4H PRN    LORazepam (ATIVAN) tablet 0.5 mg  0.5 mg Oral Q8H PRN    metoprolol succinate (TOPROL-XL) XL tablet 100 mg  100 mg Oral DAILY    polyethylene glycol (MIRALAX) packet 17 g  17 g Oral DAILY    nitroglycerin (NITROSTAT) tablet 0.4 mg  0.4 mg SubLINGual PRN    traZODone (DESYREL) tablet 50 mg  50 mg Oral QHS PRN    ezetimibe (ZETIA) tablet 10 mg  10 mg Oral DAILY    albuterol-ipratropium (DUO-NEB) 2.5 MG-0.5 MG/3 ML  3 mL Nebulization Q4H PRN    tiotropium bromide (SPIRIVA RESPIMAT) 2.5 mcg /actuation  2 Puff Inhalation DAILY    budesonide-formoteroL (SYMBICORT) 160-4.5 mcg/actuation HFA inhaler 2 Puff  2 Puff Inhalation BID RT    atorvastatin (LIPITOR) tablet 80 mg  80 mg Oral QHS    escitalopram oxalate (LEXAPRO) tablet 10 mg  10 mg Oral DAILY    pantoprazole (PROTONIX) tablet 40 mg  40 mg Oral ACB&D    oxyCODONE IR (ROXICODONE) tablet 5 mg  5 mg Oral Q4H PRN    gabapentin (NEURONTIN) capsule 100 mg  100 mg Oral TID    dilTIAZem IR (CARDIZEM) tablet 30 mg  30 mg Oral Q6H    predniSONE (DELTASONE) tablet 15 mg  15 mg Oral DAILY WITH BREAKFAST    methIMAzole (TAPAZOLE) tablet 10 mg  10 mg Oral DAILY    azithromycin (ZITHROMAX) tablet 500 mg  500 mg Oral Q MON, WED & FRI       Signed:  Jaja Friday, AMANDA    Part of this note may have been written by using a voice dictation software. The note has been proof read but may still contain some grammatical/other typographical errors.

## 2022-03-17 NOTE — PROGRESS NOTES
03/17/22 0709   NIH Stroke Scale   Interval Handoff/Transfer  ([facility] with [address], RN )   Level of Conciousness (1a) 0   LOC Questions (1b) 0   LOC Commands (1c) 0   Best Gaze (2) 0   Visual (3) 0   Facial Palsy (4) 0   Motor Arm, Left (5a) 0   Motor Arm, Right (5b) 0   Motor Leg, Left (6a) 0   Motor Leg, Right (6b) 0   Limb Ataxia (7) 0   Sensory (8) 0   Best Language (9) 0   Dysarthria (10) 0   Extinction and Inattention (11) 0   Total 0

## 2022-03-17 NOTE — PROGRESS NOTES
Date of Outreach Update:  Dolores Loza was seen and assessed. MEWS Score: 1 (03/17/22 0400)  Vitals:    03/16/22 2116 03/17/22 0000 03/17/22 0400 03/17/22 0526   BP: 127/60 134/60 (!) 127/57    Pulse:  72 66 (!) 57   Resp: 18 19 17    Temp: 97.6 °F (36.4 °C) 97.7 °F (36.5 °C) 97.3 °F (36.3 °C)    SpO2: 98% 99% 98%    Weight:       Height:             Pain Assessment  Pain Intensity 1: 0 (03/17/22 0038)  Pain Location 1: Back,Head  Pain Intervention(s) 1: Medication (see MAR)  Patient Stated Pain Goal: 0      Pt resting quietly in bed, respirations even and unlabored. Previous Outreach assessment has been reviewed. There have been no significant clinical changes since the completion of the last dated Outreach assessment. Will continue to follow up per outreach protocol.     Signed By:   Sampson Mendosa    March 17, 2022 6:39 AM '

## 2022-03-17 NOTE — PROGRESS NOTES
[facility] CARDIOLOGY PROGRESS NOTE    3/17/2022 9:06 AM    Admit Date: 3/15/2022        Subjective:   Stable overnight without angina, CHF, or palpitations. Vitals stable and controlled. Mild headache overnight but no change compared to yesterday, CT head yesterday unchanged. No other complaints overnight. Tolerating meds well. Objective:      Vitals:    03/17/22 0400 03/17/22 0526 03/17/22 0800 03/17/22 0803   BP: (!) 127/57   121/60   Pulse: 66 (!) 57  68   Resp: 17   18   Temp: 97.3 °F (36.3 °C)   97.6 °F (36.4 °C)   SpO2: 98%  (!) 87% 97%   Weight:       Height:           Physical Exam:  Neck- supple, no JVD  CV- regular rate and rhythm no MRG  Lung- clear bilaterally  Abd- soft, nontender, nondistended  Ext- no edema  Skin- warm and dry    Data Review:   Recent Labs     03/16/22 2017 03/16/22  1215 03/15/22  1913 03/15/22  1502 03/15/22  1502   NA  --  137  --   --  138   K  --  5.0  --   --  2.6*   MG  --  2.6*  --   --  1.8   BUN  --  12  --   --  13   CREA  --  0.70  --   --  0.90   GLU  --  117*  --   --  112*   WBC 5.9  --  11.3*   < > 8.8   HGB 9.4*  --  9.9*   < > 10.6*   HCT 30.6*  --  31.5*   < > 33.6*     --  245   < > 272   INR  --   --   --   --  1.0    < > = values in this interval not displayed. Assessment and Plan:       Recent non-STEMI/CAD--recent balloon dilatation to the distal right coronary and carotid bifurcation stenting with synergy drug-eluting stents to the LAD/diagonal bifurcation 5 weeks ago. See above regarding recommendations for dual antiplatelet therapy. Ideally would like to start back low-dose aspirin and 75 mg clopidogrel as soon as is safe from a neurosurgical/neurologic standpoint. Her last Effient was 2 days ago. We will not resume this but convert over to clopidogrel/low dose ASA only when okay with neurosurgery.     Subdural hematoma-stable clinically after a fall yesterday, tripped over oxygen cable.   Follow closely with serial CT imaging per neurosurgery recommendations. Lucid and appropriate at the present time.     Debility-recurrent falls, fractured femur, now with intracranial hemorrhage. Needs disposition to a nursing home or assisted living/rehab. Defer to primary physicians.     Dyslipidemia-max dose statin as tolerated     Hypertension-stable, continue meds. Permissive hypertension okay with me to prevent postural hypotension/falls     COPD-stable at the present time with no increased work of breathing and clear lungs without wheezing or crackles. Follow clinically on home meds    AMelchor Jolly MD  [facility] Cardiology  Pager [phone]

## 2022-03-17 NOTE — PROGRESS NOTES
TRANSFER - OUT REPORT:    Verbal report given to Melinda Tan RN (name) on Damian Castillo  being transferred to [facility] (unit) for routine progression of care       Report consisted of patients Situation, Background, Assessment and   Recommendations(SBAR). Information from the following report(s) SBAR, Kardex, ED Summary, Procedure Summary, Intake/Output, MAR, Recent Results, Med Rec Status, Cardiac Rhythm NSR, Alarm Parameters , Pre Procedure Checklist, Procedure Verification, Quality Measures and Dual Neuro Assessment was reviewed with the receiving nurse. Lines:   Peripheral IV 03/15/22 Right Antecubital (Active)   Site Assessment Clean, dry, & intact 03/16/22 1900   Phlebitis Assessment 0 03/16/22 1900   Infiltration Assessment 0 03/16/22 1900   Dressing Status Clean, dry, & intact 03/16/22 1900   Dressing Type Transparent;Tape 03/16/22 1900   Hub Color/Line Status Patent; Flushed 03/16/22 1900   Action Taken Open ports on tubing capped 03/16/22 0700   Alcohol Cap Used Yes 03/16/22 1900        Opportunity for questions and clarification was provided.       Patient transported with:   Monitor  O2 @ 2 liters  Registered Nurse

## 2022-03-17 NOTE — PROGRESS NOTES
SPEECH LANGUAGE PATHOLOGY: SPEECH-LANGUAGE/COGNITION: Initial Assessment and Discharge    NAME/AGE/GENDER: Tiago Curry is a 66 y.o. female  DATE: 3/17/2022  PRIMARY DIAGNOSIS: Subdural hemorrhage following injury ([facility] [address] [S06.5X9A]      ICD-10: Treatment Diagnosis: R41.841 Cognitive-Communication Deficit    RECOMMENDATIONS   TREATMENT RECOMMENDATIONS: none     STRATEGIES:    Compensatory memory strategies: Write it down     EDUCATION: Recommendations discussed with Patient     Continuation of Skilled Services/Medical Necessity:No cognitive linguistic treatment indicated    RECOMMENDATIONS for CONTINUED SPEECH THERAPY: No further speech therapy indicated at this time. ASSESSMENT   Patient presents with functional cognitive linguistic abilities. Overall score 29/30 on SLUMS. Reports has been taking \"prevagen\" for memory and feels it helps. Recommend no further speech therapy as patient functioning near cognitive baseline. REHABILITATION POTENTIAL FOR STATED GOALS: n/a    PLAN    FREQUENCY/DURATION: No further speech therapy indicated at this time as oropharyngeal swallow function is within normal limits. - Recommendations for next treatment session: No additional speech therapy indicated at this time. SUBJECTIVE   Upright in bed, alert and agreeable to participate. History of Present Injury/Illness: Ms. Cristina Oneal  has a past medical history of Arrhythmia, Arthritis, CAD (coronary artery disease) (2009, 8/19/2013), Carotid stenosis, right, Chronic anxiety (4/27/2017), COPD, Depression (5/6/2021), Emphysema lung ([address] GERD (gastroesophageal reflux disease), History of echocardiogram (06/07/2019), History of echocardiogram, Hypertension, Nausea & vomiting, Oxygen dependent, Pleurisy, and Thyroid disease. She has no past medical history of Aneurysm ([address] Asthma, Autoimmune disease ([address] Cancer ([address] Chronic kidney disease, Chronic pain, Coagulation disorder ([address] Diabetes ([address] Endocarditis, Heart failure ([facility] [address] Ill-defined condition, Liver disease, Morbid obesity ([facility] [address] PUD (peptic ulcer disease), Rheumatic fever, Seizures ([facility] [address] Sleep apnea, Stroke ([facility] [address] or Thromboembolus ([facility] [address] . She also  has a past surgical history that includes hx tonsil and adenoidectomy; hx gyn; vascular surgery procedure unlist (Right, 2019); hx heart catheterization (2019); and hx other surgical.     Problem List:  (Impairments causing functional limitations):  1. Subdural hematoma     Previous Speech Therapy: 22 during inpatient rehab stay cognitive linguistic evaluation completed  Patient was assessed with the ACEIII to determine need for additional cognitive therapy. She denies cognitive changes overall but does attest to the fact that she gets \"foggy\" after taking pain medications. Overall score of 91/100 with a norm score greater than 88 and breakdown as follows: Attention: , Memory , Fluency , Language , Visuospatial . Patient near cognitive baseline with some mild fluctuations reported related to administration of pain medications. No further ST indicated at this time. Orientation:   Person  Place  Time  Situation    Pain: Pain Scale 1: Numeric (0 - 10)  Pain Intensity 1: 0  Pain Location 1: Back;Head  Pain Intervention(s) 1: Medication (see MAR)    OBJECTIVE   Patient seen for language/cognitive linguistic evaluation:  SLUMS and other measures completed to evaluate cognitive linguistic functioning: Total score: 29/30  Highest level of education: \"some college\"  Orientation-3/3  Recall(5 words)- immediate: /5; delayed:5 (/5 with category cue)  Problem solving:3/3  Divergent naming(concrete category): 3/3 (16 words in 1 minute, repeated 2 words)  Digit manipulation: 2/2  Visuospatial: 2/2  Clock drawin/4  Immediate recall (passage)-    Patient reports she writes down information, appointments to aid in recall.    Very good insight and independently verbalized compensatory memory strategies she uses at home. Tool Used: MODIFIED SANDRA SCALE (mRS)   Score   No Symptoms  [] 0   No significant disability despite symptoms; able to carry out all usual duties and activities  [] 1   Slight disability; unable to carry out all previous activities but able to look after own affairs without assistance. [] 2   Moderate disability; requiring some help but able to walk without assistance  [] 3   Moderately severe disability; unable to walk without assistance and unable to attend to own bodily needs without assistance  [] 4   Severe disability; bedridden, incontinent, and requiring constant nursing care and attention  [] 5      Score:  Initial: 1    Interpretation of Tool: The Modified Gautier Scale is a 7-point scaled used to quantify level of disability as it relates to a patient's functional abilities. Current Medications:   No current facility-administered medications on file prior to encounter. Current Outpatient Medications on File Prior to Encounter   Medication Sig Dispense Refill    azithromycin (ZITHROMAX) 500 mg tab Take 1 Tablet by mouth every Monday, Wednesday, Friday. 36 Tablet 3    LORazepam (ATIVAN) 0.5 mg tablet Take 1 Tablet by mouth every eight (8) hours as needed for Anxiety or Agitation. Max Daily Amount: 1.5 mg. PCP will prescribe this medication if it is necessary to continue. 20 Tablet 0    magnesium oxide (MAG-OX) 400 mg tablet Take 1 Tablet by mouth daily for 20 doses. 20 Tablet 0    metoprolol succinate (TOPROL-XL) 100 mg tablet Take 1 Tablet by mouth daily. 90 Tablet 0    predniSONE (DELTASONE) 5 mg tablet Take 15 mg by mouth daily.  nystatin (MYCOSTATIN) 100,000 unit/mL suspension Take 5 mL by mouth four (4) times daily. swish and spit  Indications: thrush 473 mL 0    methIMAzole (TAPAZOLE) 10 mg tablet Take 1 Tablet by mouth daily. PCP will continue to prescribe this medication.   Indications: overactive thyroid gland 30 Tablet 0    guaiFENesin ER (MUCINEX) 1,200 mg Ta12 ER tablet Take 1 Tablet by mouth every twelve (12) hours. Pulmonologist or PCP will continue to prescribe this medication. (Patient taking differently: Take 1,200 mg by mouth every twelve (12) hours as needed. Pulmonologist or PCP will continue to prescribe this medication.) 60 Tablet 0    escitalopram oxalate (LEXAPRO) 10 mg tablet Take 1 Tablet by mouth daily. PCP will continue to prescribe this medication. Indications: anxiousness associated with depression 30 Tablet 1    roflumilast (Daliresp) 500 mcg tab tablet Take 1 Tablet by mouth daily. 90 Tablet 3    ezetimibe (ZETIA) 10 mg tablet Take 1 Tablet by mouth daily. 90 Tablet 3    budesonide-formoteroL (Symbicort) 160-4.5 mcg/actuation HFAA Take 2 Puffs by inhalation two (2) times a day. 3 Each 3    atorvastatin (LIPITOR) 80 mg tablet TAKE 1 TABLET BY MOUTH DAILY 90 Tablet 3    tiotropium (Spiriva with HandiHaler) 18 mcg inhalation capsule Take 1 Cap by inhalation daily. 90 Cap 3    albuterol (PROVENTIL VENTOLIN) 2.5 mg /3 mL (0.083 %) nebu 3 mL by Nebulization route every six (6) hours as needed for Shortness of Breath. ICD-10-J44.9, please bill to Med B 360 mL 11    pantoprazole (PROTONIX) 40 mg tablet TAKE 1 TABLET BY MOUTH TWICE DAILY 180 Tab 0    cetirizine (ZYRTEC) 10 mg tablet Take 10 mg by mouth daily as needed for Allergies.  multivitamin (ONE A DAY) tablet Take 1 Tablet by mouth daily.  aspirin delayed-release 81 mg tablet Take 81 mg by mouth daily.  acetaminophen (TYLENOL) 500 mg tablet Take 1,000 mg by mouth every eight (8) hours as needed for Pain.  acetaminophen (TYLENOL) 325 mg tablet Take 2 Tablets by mouth every eight (8) hours. Indications: for hip surgery pain (Patient not taking: Reported on 3/16/2022) 40 Tablet prn    lidocaine 4 % patch Apply patch to affected area for 12 hours a day and remove for 12 hours a day.  (Patient not taking: Reported on 2/26/2022) 30 Patch 0    polyethylene glycol (Miralax) 17 gram/dose powder Take 17 g by mouth daily. Indications: constipation (Patient not taking: Reported on 2/26/2022) 289 g prn    senna-docusate (PERICOLACE) 8.6-50 mg per tablet Take 2 Tablets by mouth daily. Indications: constipation (Patient not taking: Reported on 2/26/2022) 60 Tablet prn    traZODone (DESYREL) 50 mg tablet Take 0.5 Tablets by mouth nightly as needed for Sleep. PCP will prescribe this medication if it is necessary to continue. Indications: insomnia associated with depression (Patient not taking: Reported on 3/16/2022) 15 Tablet 0    nitroglycerin (NITROSTAT) 0.4 mg SL tablet 1 Tablet by SubLINGual route three (3) times daily as needed for Chest Pain. Up to 3 doses. (Patient taking differently: 1 Tablet by SubLINGual route three (3) times daily as needed for Chest Pain. Up to 3 doses for chest pain. then call 911) 60 Each 11    OTHER Handicap placard 1 Each 0    OXYGEN-AIR DELIVERY SYSTEMS 1 L/min by Nasal route continuous.          INTERDISCIPLINARY COLLABORATION: Registered Nurse  PRECAUTIONS/ALLERGIES: Promethazine     SAFETY:  After treatment position/precautions:  · Upright in bed  · Call light within reach    Total Treatment Duration:     Time In: 1050  Time Out: 4[address] [facility], [facility] [place], [address] [facility]

## 2022-03-18 ENCOUNTER — HOME CARE VISIT (OUTPATIENT)
Dept: HOME HEALTH SERVICES | Facility: HOME HEALTH | Age: 72
End: 2022-03-18
Payer: MEDICARE

## 2022-03-18 ENCOUNTER — HOSPITAL ENCOUNTER (INPATIENT)
Age: 72
LOS: 13 days | Discharge: HOME HEALTH | DRG: 949 | End: 2022-03-31
Attending: PHYSICAL MEDICINE & REHABILITATION | Admitting: PHYSICAL MEDICINE & REHABILITATION
Payer: MEDICARE

## 2022-03-18 VITALS
HEIGHT: 68 IN | BODY MASS INDEX: 20.72 KG/M2 | DIASTOLIC BLOOD PRESSURE: 73 MMHG | HEART RATE: 89 BPM | RESPIRATION RATE: 18 BRPM | TEMPERATURE: 97.6 F | OXYGEN SATURATION: 96 % | WEIGHT: 136.7 LBS | SYSTOLIC BLOOD PRESSURE: 148 MMHG

## 2022-03-18 DIAGNOSIS — J44.9 COPD, VERY SEVERE (HCC): Chronic | ICD-10-CM

## 2022-03-18 DIAGNOSIS — F41.9 CHRONIC ANXIETY: ICD-10-CM

## 2022-03-18 DIAGNOSIS — M54.50 ACUTE BILATERAL LOW BACK PAIN WITHOUT SCIATICA: ICD-10-CM

## 2022-03-18 DIAGNOSIS — I65.22 STENOSIS OF LEFT CAROTID ARTERY: ICD-10-CM

## 2022-03-18 DIAGNOSIS — I25.9 ISCHEMIC HEART DISEASE: ICD-10-CM

## 2022-03-18 DIAGNOSIS — S06.5XAA SDH (SUBDURAL HEMATOMA): ICD-10-CM

## 2022-03-18 DIAGNOSIS — R07.89 OTHER CHEST PAIN: ICD-10-CM

## 2022-03-18 DIAGNOSIS — J96.01 ACUTE RESPIRATORY FAILURE WITH HYPOXIA (HCC): ICD-10-CM

## 2022-03-18 DIAGNOSIS — I25.10 CAD S/P PERCUTANEOUS CORONARY ANGIOPLASTY: ICD-10-CM

## 2022-03-18 DIAGNOSIS — Z98.61 CAD S/P PERCUTANEOUS CORONARY ANGIOPLASTY: ICD-10-CM

## 2022-03-18 DIAGNOSIS — F41.9 ANXIETY: ICD-10-CM

## 2022-03-18 PROBLEM — I49.3 VENTRICULAR ECTOPY: Status: ACTIVE | Noted: 2017-05-09

## 2022-03-18 PROBLEM — R07.9 CHEST PAIN: Status: ACTIVE | Noted: 2019-05-31

## 2022-03-18 PROBLEM — D50.0 IRON DEFICIENCY ANEMIA DUE TO CHRONIC BLOOD LOSS: Status: ACTIVE | Noted: 2022-02-08

## 2022-03-18 PROBLEM — I65.21 CAROTID STENOSIS, RIGHT: Status: ACTIVE | Noted: 2019-12-03

## 2022-03-18 LAB
ANION GAP SERPL CALC-SCNC: 4 MMOL/L (ref 7–16)
BUN SERPL-MCNC: 11 MG/DL (ref 8–23)
CALCIUM SERPL-MCNC: 9.3 MG/DL (ref 8.3–10.4)
CHLORIDE SERPL-SCNC: 105 MMOL/L (ref 98–107)
CO2 SERPL-SCNC: 29 MMOL/L (ref 21–32)
COVID-19 RAPID TEST, COVR: NOT DETECTED
CREAT SERPL-MCNC: 0.6 MG/DL (ref 0.6–1)
GLUCOSE SERPL-MCNC: 123 MG/DL (ref 65–100)
POTASSIUM SERPL-SCNC: 4.1 MMOL/L (ref 3.5–5.1)
SODIUM SERPL-SCNC: 138 MMOL/L (ref 136–145)
SOURCE, COVRS: NORMAL

## 2022-03-18 PROCEDURE — 65310000000 HC RM PRIVATE REHAB

## 2022-03-18 PROCEDURE — 74011636637 HC RX REV CODE- 636/637: Performed by: FAMILY MEDICINE

## 2022-03-18 PROCEDURE — 87635 SARS-COV-2 COVID-19 AMP PRB: CPT

## 2022-03-18 PROCEDURE — 97161 PT EVAL LOW COMPLEX 20 MIN: CPT

## 2022-03-18 PROCEDURE — 97116 GAIT TRAINING THERAPY: CPT

## 2022-03-18 PROCEDURE — 3331090002 HH PPS REVENUE DEBIT

## 2022-03-18 PROCEDURE — 94640 AIRWAY INHALATION TREATMENT: CPT

## 2022-03-18 PROCEDURE — 97530 THERAPEUTIC ACTIVITIES: CPT

## 2022-03-18 PROCEDURE — 94760 N-INVAS EAR/PLS OXIMETRY 1: CPT

## 2022-03-18 PROCEDURE — 99223 1ST HOSP IP/OBS HIGH 75: CPT | Performed by: PHYSICAL MEDICINE & REHABILITATION

## 2022-03-18 PROCEDURE — 36415 COLL VENOUS BLD VENIPUNCTURE: CPT

## 2022-03-18 PROCEDURE — 77010033678 HC OXYGEN DAILY

## 2022-03-18 PROCEDURE — 80048 BASIC METABOLIC PNL TOTAL CA: CPT

## 2022-03-18 PROCEDURE — 74011250637 HC RX REV CODE- 250/637: Performed by: FAMILY MEDICINE

## 2022-03-18 PROCEDURE — 3331090001 HH PPS REVENUE CREDIT

## 2022-03-18 PROCEDURE — 74011250637 HC RX REV CODE- 250/637: Performed by: PHYSICAL MEDICINE & REHABILITATION

## 2022-03-18 RX ORDER — METHIMAZOLE 5 MG/1
10 TABLET ORAL DAILY
Status: CANCELLED | OUTPATIENT
Start: 2022-03-19

## 2022-03-18 RX ORDER — AMOXICILLIN 250 MG
2 CAPSULE ORAL
Status: DISCONTINUED | OUTPATIENT
Start: 2022-03-18 | End: 2022-03-28

## 2022-03-18 RX ORDER — POLYETHYLENE GLYCOL 3350 17 G/17G
17 POWDER, FOR SOLUTION ORAL DAILY
Status: CANCELLED | OUTPATIENT
Start: 2022-03-19

## 2022-03-18 RX ORDER — PANTOPRAZOLE SODIUM 40 MG/1
40 TABLET, DELAYED RELEASE ORAL
Status: DISCONTINUED | OUTPATIENT
Start: 2022-03-18 | End: 2022-03-31 | Stop reason: HOSPADM

## 2022-03-18 RX ORDER — GABAPENTIN 100 MG/1
100 CAPSULE ORAL 3 TIMES DAILY
Status: CANCELLED | OUTPATIENT
Start: 2022-03-18

## 2022-03-18 RX ORDER — PREDNISONE 10 MG/1
15 TABLET ORAL
Status: CANCELLED | OUTPATIENT
Start: 2022-03-19

## 2022-03-18 RX ORDER — GUAIFENESIN 600 MG/1
600 TABLET, EXTENDED RELEASE ORAL EVERY 12 HOURS
Status: DISCONTINUED | OUTPATIENT
Start: 2022-03-18 | End: 2022-03-31 | Stop reason: HOSPADM

## 2022-03-18 RX ORDER — AZITHROMYCIN 250 MG/1
500 TABLET, FILM COATED ORAL
Status: DISCONTINUED | OUTPATIENT
Start: 2022-03-18 | End: 2022-03-31 | Stop reason: HOSPADM

## 2022-03-18 RX ORDER — TRAZODONE HYDROCHLORIDE 50 MG/1
50 TABLET ORAL
Status: CANCELLED | OUTPATIENT
Start: 2022-03-18

## 2022-03-18 RX ORDER — NITROGLYCERIN 0.4 MG/1
0.4 TABLET SUBLINGUAL AS NEEDED
Status: DISCONTINUED | OUTPATIENT
Start: 2022-03-18 | End: 2022-03-31 | Stop reason: HOSPADM

## 2022-03-18 RX ORDER — ACETAMINOPHEN 325 MG/1
650 TABLET ORAL
Status: DISCONTINUED | OUTPATIENT
Start: 2022-03-18 | End: 2022-03-31 | Stop reason: HOSPADM

## 2022-03-18 RX ORDER — NITROGLYCERIN 0.4 MG/1
0.4 TABLET SUBLINGUAL AS NEEDED
Status: CANCELLED | OUTPATIENT
Start: 2022-03-18

## 2022-03-18 RX ORDER — PANTOPRAZOLE SODIUM 40 MG/1
40 TABLET, DELAYED RELEASE ORAL
Status: CANCELLED | OUTPATIENT
Start: 2022-03-18

## 2022-03-18 RX ORDER — METOPROLOL SUCCINATE 50 MG/1
100 TABLET, EXTENDED RELEASE ORAL DAILY
Status: DISCONTINUED | OUTPATIENT
Start: 2022-03-19 | End: 2022-03-31 | Stop reason: HOSPADM

## 2022-03-18 RX ORDER — EZETIMIBE 10 MG/1
10 TABLET ORAL DAILY
Status: DISCONTINUED | OUTPATIENT
Start: 2022-03-19 | End: 2022-03-31 | Stop reason: HOSPADM

## 2022-03-18 RX ORDER — ONDANSETRON 4 MG/1
4 TABLET, ORALLY DISINTEGRATING ORAL
Status: DISCONTINUED | OUTPATIENT
Start: 2022-03-18 | End: 2022-03-31 | Stop reason: HOSPADM

## 2022-03-18 RX ORDER — BISACODYL 5 MG
5 TABLET, DELAYED RELEASE (ENTERIC COATED) ORAL DAILY PRN
Status: DISCONTINUED | OUTPATIENT
Start: 2022-03-18 | End: 2022-03-31 | Stop reason: HOSPADM

## 2022-03-18 RX ORDER — GABAPENTIN 100 MG/1
100 CAPSULE ORAL 3 TIMES DAILY
Status: DISCONTINUED | OUTPATIENT
Start: 2022-03-18 | End: 2022-03-31 | Stop reason: HOSPADM

## 2022-03-18 RX ORDER — PREDNISONE 10 MG/1
15 TABLET ORAL
Status: DISCONTINUED | OUTPATIENT
Start: 2022-03-19 | End: 2022-03-31 | Stop reason: HOSPADM

## 2022-03-18 RX ORDER — AMOXICILLIN 250 MG
2 CAPSULE ORAL
Status: CANCELLED | OUTPATIENT
Start: 2022-03-18

## 2022-03-18 RX ORDER — METHIMAZOLE 5 MG/1
10 TABLET ORAL DAILY
Status: DISCONTINUED | OUTPATIENT
Start: 2022-03-19 | End: 2022-03-31 | Stop reason: HOSPADM

## 2022-03-18 RX ORDER — LORAZEPAM 0.5 MG/1
0.5 TABLET ORAL
Status: CANCELLED | OUTPATIENT
Start: 2022-03-18

## 2022-03-18 RX ORDER — ATORVASTATIN CALCIUM 80 MG/1
80 TABLET, FILM COATED ORAL
Status: CANCELLED | OUTPATIENT
Start: 2022-03-18

## 2022-03-18 RX ORDER — EZETIMIBE 10 MG/1
10 TABLET ORAL DAILY
Status: CANCELLED | OUTPATIENT
Start: 2022-03-19

## 2022-03-18 RX ORDER — ATORVASTATIN CALCIUM 80 MG/1
80 TABLET, FILM COATED ORAL
Status: DISCONTINUED | OUTPATIENT
Start: 2022-03-18 | End: 2022-03-31 | Stop reason: HOSPADM

## 2022-03-18 RX ORDER — TRAZODONE HYDROCHLORIDE 50 MG/1
50 TABLET ORAL
Status: DISCONTINUED | OUTPATIENT
Start: 2022-03-18 | End: 2022-03-31 | Stop reason: HOSPADM

## 2022-03-18 RX ORDER — ACETAMINOPHEN 325 MG/1
650 TABLET ORAL
Status: CANCELLED | OUTPATIENT
Start: 2022-03-18

## 2022-03-18 RX ORDER — GUAIFENESIN 100 MG/5ML
81 LIQUID (ML) ORAL DAILY
Status: DISCONTINUED | OUTPATIENT
Start: 2022-03-19 | End: 2022-03-31 | Stop reason: HOSPADM

## 2022-03-18 RX ORDER — BUDESONIDE AND FORMOTEROL FUMARATE DIHYDRATE 160; 4.5 UG/1; UG/1
2 AEROSOL RESPIRATORY (INHALATION)
Status: CANCELLED | OUTPATIENT
Start: 2022-03-18

## 2022-03-18 RX ORDER — OXYCODONE HYDROCHLORIDE 5 MG/1
5 TABLET ORAL
Status: CANCELLED | OUTPATIENT
Start: 2022-03-18

## 2022-03-18 RX ORDER — LORAZEPAM 0.5 MG/1
0.5 TABLET ORAL
Status: DISCONTINUED | OUTPATIENT
Start: 2022-03-18 | End: 2022-03-31 | Stop reason: HOSPADM

## 2022-03-18 RX ORDER — POLYETHYLENE GLYCOL 3350 17 G/17G
17 POWDER, FOR SOLUTION ORAL DAILY
Status: DISCONTINUED | OUTPATIENT
Start: 2022-03-19 | End: 2022-03-31 | Stop reason: HOSPADM

## 2022-03-18 RX ORDER — BUDESONIDE AND FORMOTEROL FUMARATE DIHYDRATE 160; 4.5 UG/1; UG/1
2 AEROSOL RESPIRATORY (INHALATION)
Status: DISCONTINUED | OUTPATIENT
Start: 2022-03-18 | End: 2022-03-31 | Stop reason: HOSPADM

## 2022-03-18 RX ORDER — OXYCODONE HYDROCHLORIDE 5 MG/1
5 TABLET ORAL
Status: DISCONTINUED | OUTPATIENT
Start: 2022-03-18 | End: 2022-03-31 | Stop reason: HOSPADM

## 2022-03-18 RX ORDER — GABAPENTIN 100 MG/1
100 CAPSULE ORAL 3 TIMES DAILY
Qty: 90 CAPSULE | Refills: 0 | Status: ON HOLD
Start: 2022-03-18 | End: 2022-03-30 | Stop reason: SDUPTHER

## 2022-03-18 RX ORDER — ONDANSETRON 4 MG/1
4 TABLET, ORALLY DISINTEGRATING ORAL
Status: CANCELLED | OUTPATIENT
Start: 2022-03-18

## 2022-03-18 RX ORDER — BISACODYL 5 MG
5 TABLET, DELAYED RELEASE (ENTERIC COATED) ORAL DAILY PRN
Status: CANCELLED | OUTPATIENT
Start: 2022-03-18

## 2022-03-18 RX ORDER — ESCITALOPRAM OXALATE 10 MG/1
10 TABLET ORAL DAILY
Status: CANCELLED | OUTPATIENT
Start: 2022-03-19

## 2022-03-18 RX ORDER — ESCITALOPRAM OXALATE 10 MG/1
10 TABLET ORAL DAILY
Status: DISCONTINUED | OUTPATIENT
Start: 2022-03-19 | End: 2022-03-31 | Stop reason: HOSPADM

## 2022-03-18 RX ORDER — AZITHROMYCIN 250 MG/1
500 TABLET, FILM COATED ORAL
Status: CANCELLED | OUTPATIENT
Start: 2022-03-18

## 2022-03-18 RX ORDER — METOPROLOL SUCCINATE 50 MG/1
100 TABLET, EXTENDED RELEASE ORAL DAILY
Status: CANCELLED | OUTPATIENT
Start: 2022-03-19

## 2022-03-18 RX ADMIN — OXYCODONE 5 MG: 5 TABLET ORAL at 11:53

## 2022-03-18 RX ADMIN — TIOTROPIUM BROMIDE INHALATION SPRAY 2 PUFF: 3.12 SPRAY, METERED RESPIRATORY (INHALATION) at 08:26

## 2022-03-18 RX ADMIN — METHIMAZOLE 10 MG: 10 TABLET ORAL at 08:06

## 2022-03-18 RX ADMIN — OXYCODONE HYDROCHLORIDE 5 MG: 5 TABLET ORAL at 19:51

## 2022-03-18 RX ADMIN — GABAPENTIN 100 MG: 100 CAPSULE ORAL at 08:11

## 2022-03-18 RX ADMIN — ATORVASTATIN CALCIUM 80 MG: 80 TABLET, FILM COATED ORAL at 20:42

## 2022-03-18 RX ADMIN — EZETIMIBE 10 MG: 10 TABLET ORAL at 08:10

## 2022-03-18 RX ADMIN — BUDESONIDE AND FORMOTEROL FUMARATE DIHYDRATE 2 PUFF: 160; 4.5 AEROSOL RESPIRATORY (INHALATION) at 19:55

## 2022-03-18 RX ADMIN — ESCITALOPRAM OXALATE 10 MG: 10 TABLET ORAL at 08:06

## 2022-03-18 RX ADMIN — GABAPENTIN 100 MG: 100 CAPSULE ORAL at 16:24

## 2022-03-18 RX ADMIN — LORAZEPAM 0.5 MG: 1 TABLET ORAL at 08:06

## 2022-03-18 RX ADMIN — PANTOPRAZOLE SODIUM 40 MG: 40 TABLET, DELAYED RELEASE ORAL at 16:23

## 2022-03-18 RX ADMIN — BUDESONIDE AND FORMOTEROL FUMARATE DIHYDRATE 2 PUFF: 160; 4.5 AEROSOL RESPIRATORY (INHALATION) at 08:26

## 2022-03-18 RX ADMIN — LORAZEPAM 0.5 MG: 0.5 TABLET ORAL at 19:51

## 2022-03-18 RX ADMIN — METOPROLOL SUCCINATE 100 MG: 25 TABLET, EXTENDED RELEASE ORAL at 08:06

## 2022-03-18 RX ADMIN — PANTOPRAZOLE SODIUM 40 MG: 40 TABLET, DELAYED RELEASE ORAL at 05:53

## 2022-03-18 RX ADMIN — AZITHROMYCIN MONOHYDRATE 500 MG: 250 TABLET ORAL at 20:40

## 2022-03-18 RX ADMIN — GABAPENTIN 100 MG: 100 CAPSULE ORAL at 20:42

## 2022-03-18 RX ADMIN — PREDNISONE 15 MG: 5 TABLET ORAL at 08:06

## 2022-03-18 RX ADMIN — GUAIFENESIN 600 MG: 600 TABLET ORAL at 20:40

## 2022-03-18 NOTE — PROGRESS NOTES
Andi [facility] CRITICAL CARE OUTREACH NURSE PROGRESS REPORT      SUBJECTIVE: Called to assess patient secondary to transfer from critical care. MEWS Score: 1 (03/18/22 0400)  Vitals:    03/18/22 0400 03/18/22 0756 03/18/22 0800 03/18/22 0826   BP: 122/60 127/60 127/60    Pulse: 71 79 79    Resp: 18  18    Temp: 97.7 °F (36.5 °C) 97.4 °F (36.3 °C) 97.4 °F (36.3 °C)    SpO2: 97% 95% 95% 96%   Weight:       Height:            LAB DATA:    Recent Labs     03/18/22  0500 03/16/22  1215 03/15/22  1502    137 138   K 4.1 5.0 2.6*    105 97*   CO2 29 27 34*   AGAP 4* 5* 7   * 117* 112*   BUN 11 12 13   CREA 0.60 0.70 0.90   GFRAA >60 >60 >60   GFRNA >60 >60 >60   CA 9.3 9.3 9.4   MG  --  2.6* 1.8   ALB  --   --  3.5   TP  --   --  7.4   GLOB  --   --  3.9*   AGRAT  --   --  0.9*   ALT  --   --  22        Recent Labs     03/17/22  1716 03/16/22  2017 03/15/22  1913   WBC 7.1 5.9 11.3*   HGB 9.3* 9.4* 9.9*   HCT 29.8* 30.6* 31.5*    238 245          OBJECTIVE: On arrival to room, I found patient to be in bed resting quietly. ASSESSMENT:  Patient easily awakes upon entry to the room. Patient is alert and oriented. NIH 0, GCS 15. States she continues to have some residual hip and back pain. Denies SOB. Respirations even and unlabored. Denies any needs or concerns at this time. PLAN: VS, labs, and progress notes reviewed. Primary RN to call with concerns. Will continue to follow per outreach protocol.

## 2022-03-18 NOTE — PROGRESS NOTES
PHYSICAL THERAPY EXAMINATION  TIME IN 1509  TIME OUT 1551  [facility]  Patient Age: 66 y.o. Past Medical History:   Past Medical History:   Diagnosis Date    Arrhythmia     Arthritis     CAD (coronary artery disease) 2009, 8/19/2013    PCI,  Lauren Wilson     Carotid stenosis, right     endarterectomy 11/25/19    Chronic anxiety 4/27/2017    COPD     recent referral to [address] for lung transplant    Depression 5/6/2021    Emphysema lung ([facility] [address]     GERD (gastroesophageal reflux disease)     controlled with medication    History of echocardiogram 06/07/2019    History of echocardiogram     echo 06/07/19 LVEF 55-60%    Hypertension     Nausea & vomiting     Oxygen dependent     1 liter at night    Pleurisy     Thyroid disease     pt denies       Medical Diagnosis:  SDH (subdural hematoma) ([facility] [address] [S06.5X9A] <principal problem not specified>    Precautions at Admission: Other (comment) (Fall precautions, WBAT RLE)    Therapy Diagnosis:   Difficulty with bed mobility  [x]     Difficulty with functional transfers  [x]     Difficulty with ambulation  [x]     Difficulty with stair negotiations  [x]       Problem List:    Decreased strength R LE  [x]     Decreased strength trunk/core  [x]     Decreased AROM   [x]     Decreased PROM  []    Decreased endurance  [x]     Decreased balance sitting  []     Decreased balance standing  [x]     Pain   []     Slow ambulation velocity  [x]    Decreased coordination  [x]    Decreased safety awareness  []      Functional Limitations:   Decreased independence with bed mobility  [x]     Decreased independence with functional transfers  [x]     Decreased independence with ambulation  [x]     Decreased independence with stair negotiation  [x]       Previous Functional Level: Patient reporting she was ambulating short distances with RW inside home. Reports ambulation was limited due to COPD.  Reports significant other was assisting her with ADLs with compression stockings and set up assist with supervision for bathing and dressing    Home Environment: Home Environment: Private residence  # Steps to Enter: 1  Rails to Enter: No  One/Two Story Residence: One story  Living Alone: No  Support Systems: Spouse/Significant Other  Patient Expects to be Discharged to[de-identified] Home with family assistance  Current DME Used/Available at Home: Walker,Wheelchair  Tub or Shower Type: Shower (door. No bars)         Outcome Measures: Vital Signs:Sitting /57 HR 83 02 sat 95% on 02 at 1 lpm, 02 sat 87% HR 94 after ambulating 60 ft on 02 at 1 lpm  Patient Vitals for the past 12 hrs:   Temp Pulse Resp BP SpO2   03/18/22 1556 97.9 °F (36.6 °C) 72 16 (!) 119/58 98 %   03/18/22 1356 98 °F (36.7 °C) 82 24 (!) 121/46 97 %     Pain level:No c/o pain during treatment  Pain location:NA  Pain interventions:NA    Patient education:PT POC and goals, Bed mobility training,transfer training, gait training, balance training,fall precautions, body mechanics,activity pacing, energy conservation, Patient verbalizing understanding and demonstrating understanding of patient education. Recommend follow up education.     Interdisciplinary Communication:spoke with RN regarding functional status      Cognition: Alert, able to follow commands,cooperating,participating, motivated,        MMT Initial Asssessment   Right Lower Extremity Left Lower Extremity   Hip Flexion 4- 4+   Knee Extension 5 5   Knee Flexion 5 5   Ankle Dorsiflexion 5 5   0/5 No palpable muscle contraction  1/5 Palpable muscle contraction, no joint movement  2-/5 Less than full range of motion in gravity eliminated position  2/5 Able to complete full range of motion in gravity eliminated position  2+/5 Able to initiate movement against gravity  3-/5 More than half but not full range of motion against gravity  3/5 Able to complete full range of motion against gravity  3+/5 Completes full range of motion against gravity with minimal resistance  4-/5 Completes full range of motion against gravity with minimal-moderate resistance  4/5 Completes full range of motion against gravity with moderate resistance  4+/5 Completes full range of motion against gravity with moderate-maximum resistance  5/5 Completes full range of motion against gravity with maximum resistance     AROM: bilateral LE generally decreased functional, RLE hip AROM slower movements due to soreness from bruising    PRIMARY MODE OF LOCOMOTION: ambulation      BED/CHAIR/WHEELCHAIR TRANSFERS Initial Assessment   Rolling Right 5 (Supervision)   Rolling Left 5 (Supervision)   Supine to Sit 5 (Supervision)   Sit to Stand Contact guard assistance   Sit to Supine 5 (Supervision)   Transfer Type SPT with walker   Comments Increased time and effort to complete bed mobility and transfers. Requires time to sit on side of bed after transitioning to sitting   Car Transfer Not tested   Car Type         WHEELCHAIR MOBILITY/MANAGEMENT Initial Assessment   Able to Propel     W/C Assistance     Curbs/ramps assistance required 0 (Not tested)   Wheelchair set up assistance required 0 (Not tested)   Wheelchair management         WALKING INDEPENDENCE Initial Assessment   Assistive device Walker, rolling,Gait belt   Ambulation assistance - level surface 4 (Contact guard assistance)   Distance 60 Feet (ft)   Comments slow cont step through gait pattern with mild decrease in step length. SLower pace to control RR and 02 sat, 2 standing rest breaks due to SOB.    Ambulation assistance - unlevel surface 0 (Not tested)       STEPS/STAIRS Initial Assessment   Steps/Stairs ambulated 0   Stairs Assistance     Rail Use     Comments Unable to ambulate up/down steps due to fatigue   Curbs/Ramps 0 (Not tested)       QUALITY INDICATOR ASSIST COMMENTS   Roll right (&return to back) 4: Supervision or touching A    Roll left (& return to back) 4: Supervision or touching A    Supine to sit 4: Supervision or touching A Sit to stand 4: Supervision or touching A    Chair/bed-to-chair transfer 4: Supervision or touching A    Walk 10 feet 4: Supervision or touching A    Walk 50 feet with 2 turns 4: Supervision or touching A    Walk 150 feet Not Tested: Not attempted due to fatigue SOB with drop in 02 sat after 60ft    Walk 10 feet on uneven  Not Tested: Not attempted due to fatigue, impaired balance    1 step/curb Not Tested: Not attempted due to fatigue, impaired balance    4 steps Not Tested: Not attempted due to fatigue, impaired balance    12 steps Not Tested: Not attempted due to fatigue, impaired balance     object Not Tested: Not attempted due to impaired balance    Wheel 50' w/2 turns Not Tested: Not applicable secondary to pt not completing activity previously    Wheel 150' Not Tested: Not applicable secondary to pt not completing activity previously    Car Transfer Not Tested: Not attempted due to fatigue             PHYSICAL THERAPY PLAN OF CARE    Therapy Diagnosis:   Please see table above    Order received from MD for physical therapy services and chart reviewed. Pt to be seen at least 5 times per week for at least 1.5 hours of physical therapy per day for 1 week. Thank you for the referral.    LTGs: 1 week  1. Patient roll left and right and transfer supine<>sit independently without bed rail and without HOB elevation   2. Patient transfer sit<>stand and perform stand pivot transfer with RW and modified independence   3. Patient ambulate 80 ft with RW managing 02 line and modified independence   4. Patient ambulate up/down 3 inch step with RW and modified independence    Pt would benefit from skilled physical therapy in order to improve independent functional mobility within the home.  Interventions may include range of motion (AROM, PROM B LE/trunk), motor function (B LE/trunk strengthening/coordination), activity tolerance (vitals, oxygen saturation levels), bed mobility training, balance activities, gait training (progressive ambulation program), and functional transfer training. Please see IRC; Interdisciplinary Eval, Care Plan, and Patient Education for further information regarding physical therapy examination and plan of care. Patient returned to room at end of treatment. Patient supine in bed with head of bed elevated and bed rails up x 2. Needs placed in reach of patient.  02 at 1 lpm, 02 sat 92%    Alicia Christianson, PT  3/18/2022

## 2022-03-18 NOTE — PROGRESS NOTES
Problem: Falls - Risk of  Goal: *Absence of Falls  Description: Document Shukri Martin Fall Risk and appropriate interventions in the flowsheet.   Outcome: Progressing Towards Goal  Note: Fall Risk Interventions:  Mobility Interventions: Bed/chair exit alarm,OT consult for ADLs,Patient to call before getting OOB,Utilize walker, cane, or other assistive device         Medication Interventions: Bed/chair exit alarm,Evaluate medications/consider consulting pharmacy,Patient to call before getting OOB,Teach patient to arise slowly    Elimination Interventions: Call light in reach,Patient to call for help with toileting needs,Toileting schedule/hourly rounds    History of Falls Interventions: Bed/chair exit alarm,Consult care management for discharge planning,Evaluate medications/consider consulting pharmacy

## 2022-03-18 NOTE — PROGRESS NOTES
03/18/22 0736   NIH Stroke Scale   Interval Handoff/Transfer  ([facility] with ALINE Sotelo )   Level of Conciousness (1a) 0   LOC Questions (1b) 0   LOC Commands (1c) 0   Best Gaze (2) 0   Visual (3) 0   Facial Palsy (4) 0   Motor Arm, Left (5a) 0   Motor Arm, Right (5b) 0   Motor Leg, Left (6a) 0   Motor Leg, Right (6b) 0   Limb Ataxia (7) 0   Sensory (8) 0   Best Language (9) 0   Dysarthria (10) 0   Extinction and Inattention (11) 0   Total 0

## 2022-03-18 NOTE — PROGRESS NOTES
Date of Outreach Update:  Cristian Caraballo was seen and assessed. Patient alert and oriented in no acute distress at this time. Patient talking on telephone at this time with family. Denies any pain or concerns at this time. MEWS Score: 1 (03/17/22 2000)  Vitals:    03/17/22 1600 03/17/22 1837 03/17/22 2000 03/17/22 2117   BP: 113/60  (!) 110/53    Pulse: 72  77    Resp: 18  18    Temp: 98 °F (36.7 °C)  98.2 °F (36.8 °C)    SpO2: 96% 94% 96%    Weight:    62 kg (136 lb 11.2 oz)   Height:            Previous Outreach assessment has been reviewed. There have been no significant clinical changes since the completion of the last dated Outreach assessment. Will continue to follow up per outreach protocol.     Signed By:   Alison Nichols RN    March 17, 2022 11:24 PM

## 2022-03-18 NOTE — PROGRESS NOTES
Problem: Falls - Risk of  Goal: *Absence of Falls  Description: Document Salma Kaur Fall Risk and appropriate interventions in the flowsheet.   Outcome: Progressing Towards Goal  Note: Fall Risk Interventions:  Mobility Interventions: Bed/chair exit alarm,OT consult for ADLs,Patient to call before getting OOB,PT Consult for mobility concerns         Medication Interventions: Bed/chair exit alarm,Evaluate medications/consider consulting pharmacy,Patient to call before getting OOB,Teach patient to arise slowly    Elimination Interventions: Call light in reach,Bed/chair exit alarm,Patient to call for help with toileting needs,Stay With Me (per policy)    History of Falls Interventions: Bed/chair exit alarm,Door open when patient unattended,Investigate reason for fall         Problem: Patient Education: Go to Patient Education Activity  Goal: Patient/Family Education  Outcome: Progressing Towards Goal     Problem: Patient Education: Go to Patient Education Activity  Goal: Patient/Family Education  Outcome: Progressing Towards Goal     Problem: TIA/CVA Stroke: Day 2 Until Discharge  Goal: Activity/Safety  Outcome: Progressing Towards Goal  Goal: Diagnostic Test/Procedures  Outcome: Progressing Towards Goal  Goal: Nutrition/Diet  Outcome: Progressing Towards Goal  Goal: Discharge Planning  Outcome: Progressing Towards Goal  Goal: Medications  Outcome: Progressing Towards Goal  Goal: Respiratory  Outcome: Progressing Towards Goal  Goal: Treatments/Interventions/Procedures  Outcome: Progressing Towards Goal  Goal: Psychosocial  Outcome: Progressing Towards Goal  Goal: *Verbalizes anxiety and depression are reduced or absent  Outcome: Progressing Towards Goal  Goal: *Absence of aspiration  Outcome: Progressing Towards Goal  Goal: *Absence of deep venous thrombosis signs and symptoms(Stroke Metric)  Outcome: Progressing Towards Goal  Goal: *Optimal pain control at patient's stated goal  Outcome: Progressing Towards Goal  Goal: *Tolerating diet  Outcome: Progressing Towards Goal  Goal: *Ability to perform ADLs and demonstrates progressive mobility and function  Outcome: Progressing Towards Goal  Goal: *Stroke education continued(Stroke Metric)  Outcome: Progressing Towards Goal

## 2022-03-18 NOTE — PROGRESS NOTES
03/18/22 1200   NIH Stroke Scale   Interval Reassessment   Level of Conciousness (1a) 0   LOC Questions (1b) 0   LOC Commands (1c) 0   Best Gaze (2) 0   Visual (3) 0   Facial Palsy (4) 0   Motor Arm, Left (5a) 0   Motor Arm, Right (5b) 0   Motor Leg, Left (6a) 0   Motor Leg, Right (6b) 0   Limb Ataxia (7) 0   Sensory (8) 0   Dysarthria (10) 0   Extinction and Inattention (11) 0     Discharge

## 2022-03-18 NOTE — DISCHARGE SUMMARY
Hospitalist Discharge Summary   Admit Date:  3/15/2022  2:01 PM   DC Note date: 3/18/2022  Name:  Emmy Chahal   Age:  66 y.o. Sex:  female  :  1950   MRN:  [id number]   Room:  [facility]  PCP:  Kenny Tobar MD    Presenting Complaint: Fall (Pt reports fall today at home on hardwood, tripped over 02 cord. Hit head. Denies any LOC. AAOX4. PERRLA. Pain to R Hip. No bleeding. Pt does take blood thinner. )    Initial Admission Diagnosis: Subdural hemorrhage following injury ([facility] [S06.5X9A]     Problem List for this Hospitalization:  Hospital Problems as of 3/18/2022 Date Reviewed: 2022          Codes Class Noted - Resolved POA    * (Principal) Post-traumatic subdural hematoma ([facility] [address] ICD-10-CM: J93.9E5I  ICD-9-CM: 852.20  3/15/2022 - Present Yes        Hypokalemic alkalosis ICD-10-CM: E87.3  ICD-9-CM: 276.3  3/15/2022 - Present Yes        Closed right hip fracture ([facility] [facility] ICD-10-CM: S72.001A  ICD-9-CM: 820.8  2022 - Present Yes        DNR (do not resuscitate) ICD-10-CM: Z66  ICD-9-CM: V49.86  2021 - Present Yes    Overview Signed 2021 12:25 PM by Verner Plum, NP     POST form completed - DNR but full treatment, including intubation. No TRACH. No PEG. Centrilobular emphysema ([facility] [facility] ICD-10-CM: J43.2  ICD-9-CM: 492.8  2019 - Present Yes        Chronic respiratory failure with hypoxia (HCC) (Chronic) ICD-10-CM: J96.11  ICD-9-CM: 518.83, 799.02  2019 - Present Yes        CAD S/P percutaneous coronary angioplasty ICD-10-CM: I25.10, Z98.61  ICD-9-CM: 414.01, V45.82  2019 - Present Yes        COPD, very severe (HCC) (Chronic) ICD-10-CM: J44.9  ICD-9-CM: 496  2013 - Present Yes            Did Patient have Sepsis (YES OR NO): No     Hospital Course:  Ms. Zenaida Saucedo is a 67 yo female with PMH of CAD on asa, effient ([facility] with stent/ PCI ), right hip fracture repair, COPD on home O2 at 1 L NC, admitted s/p trip over O2 tubing and fall.  CT Head WO Contrast demonstrated a small left temporal convexity subdural hematoma on the right without any evidence of brain compression or mid-line shift. Hip XR showing postoperative changes from repair of subacute right hip fracture and medial displacement of lesser trochanter. Labs: K 2.6 HCO3 34 Scr 0.9 INR 1.0. rec'd 40 meq IV kcl in ED  Repeat CT Head 3/17/22 showing stable right SDH with no mass effect or midline shift. ASA/Plavix held per neurosurgery recs with no plans for surgical intervention at this time. Cardiology consulted/following due to her recent balloon dilation with drug eluding stent. Ortho consulted for avulsion of the lesser trochanter seen on XR with no plans for surgical intervention. Patient is WBAT with walker assistance per Ortho recs, with 2 week follow-up. Patient alert and oriented x 3, conversive with congruent conversation. Denies, HA,N/V, dizziness, visual changes, SOB, CP, AMS. Patient/labs stable for discharge and patient discharged to [facility] for further therapies and medical management     Disposition: Rehab Facility  Diet: ADULT DIET Regular  Code Status: DNR    Follow Up Orders: Follow-up Appointments   Procedures    FOLLOW UP VISIT Appointment in: Two Weeks Recheck. Call  [phone] for appointment     Recheck. Call  [phone] for appointment     Standing Status:   Standing     Number of Occurrences:   1     Order Specific Question:   Appointment in     Answer: Two Weeks       Follow-up Information     Follow up With Specialties Details Why Contact Info    Grzegorz Bustos MD Family Medicine   [address]  [address]  [phone]            Follow up labs/diagnostics (ultimately defer to outpatient provider):  Ortho, Neurosurgery    Time spent in patient discharge and coordination 38 minutes. Plan was discussed with patient. All questions answered. Patient was stable at time of discharge.   Instructions given to call a physician or return if any concerns. Discharge Info:   Current Discharge Medication List      START taking these medications    Details   gabapentin (NEURONTIN) 100 mg capsule Take 1 Capsule by mouth three (3) times daily for 30 days. Qty: 90 Capsule, Refills: 0  Start date: 3/18/2022, End date: 4/17/2022         CONTINUE these medications which have NOT CHANGED    Details   LORazepam (ATIVAN) 0.5 mg tablet Take 1 Tablet by mouth every eight (8) hours as needed for Anxiety or Agitation. Max Daily Amount: 1.5 mg. PCP will prescribe this medication if it is necessary to continue. Qty: 20 Tablet, Refills: 0    Associated Diagnoses: Chronic anxiety      magnesium oxide (MAG-OX) 400 mg tablet Take 1 Tablet by mouth daily for 20 doses. Qty: 20 Tablet, Refills: 0      metoprolol succinate (TOPROL-XL) 100 mg tablet Take 1 Tablet by mouth daily. Qty: 90 Tablet, Refills: 0    Associated Diagnoses: Ischemic heart disease      predniSONE (DELTASONE) 5 mg tablet Take 15 mg by mouth daily. methIMAzole (TAPAZOLE) 10 mg tablet Take 1 Tablet by mouth daily. PCP will continue to prescribe this medication. Indications: overactive thyroid gland  Qty: 30 Tablet, Refills: 0      escitalopram oxalate (LEXAPRO) 10 mg tablet Take 1 Tablet by mouth daily. PCP will continue to prescribe this medication. Indications: anxiousness associated with depression  Qty: 30 Tablet, Refills: 1      roflumilast (Daliresp) 500 mcg tab tablet Take 1 Tablet by mouth daily. Qty: 90 Tablet, Refills: 3      ezetimibe (ZETIA) 10 mg tablet Take 1 Tablet by mouth daily. Qty: 90 Tablet, Refills: 3    Associated Diagnoses: Ischemic heart disease      budesonide-formoteroL (Symbicort) 160-4.5 mcg/actuation HFAA Take 2 Puffs by inhalation two (2) times a day.   Qty: 3 Each, Refills: 3      atorvastatin (LIPITOR) 80 mg tablet TAKE 1 TABLET BY MOUTH DAILY  Qty: 90 Tablet, Refills: 3    Associated Diagnoses: Ischemic heart disease      tiotropium (Spiriva with HandiHaler) 18 mcg inhalation capsule Take 1 Cap by inhalation daily. Qty: 90 Cap, Refills: 3      albuterol (PROVENTIL VENTOLIN) 2.5 mg /3 mL (0.083 %) nebu 3 mL by Nebulization route every six (6) hours as needed for Shortness of Breath. ICD-10-J44.9, please bill to Med B  Qty: 360 mL, Refills: 11      pantoprazole (PROTONIX) 40 mg tablet TAKE 1 TABLET BY MOUTH TWICE DAILY  Qty: 180 Tab, Refills: 0      cetirizine (ZYRTEC) 10 mg tablet Take 10 mg by mouth daily as needed for Allergies. multivitamin (ONE A DAY) tablet Take 1 Tablet by mouth daily. acetaminophen (TYLENOL) 325 mg tablet Take 2 Tablets by mouth every eight (8) hours. Indications: for hip surgery pain  Qty: 40 Tablet, Refills: prn      polyethylene glycol (Miralax) 17 gram/dose powder Take 17 g by mouth daily. Indications: constipation  Qty: 289 g, Refills: prn      senna-docusate (PERICOLACE) 8.6-50 mg per tablet Take 2 Tablets by mouth daily. Indications: constipation  Qty: 60 Tablet, Refills: prn      traZODone (DESYREL) 50 mg tablet Take 0.5 Tablets by mouth nightly as needed for Sleep. PCP will prescribe this medication if it is necessary to continue. Indications: insomnia associated with depression  Qty: 15 Tablet, Refills: 0      nitroglycerin (NITROSTAT) 0.4 mg SL tablet 1 Tablet by SubLINGual route three (3) times daily as needed for Chest Pain. Up to 3 doses. Qty: 60 Each, Refills: 11      OTHER Handicap placard  Qty: 1 Each, Refills: 0      OXYGEN-AIR DELIVERY SYSTEMS 1 L/min by Nasal route continuous.          STOP taking these medications       azithromycin (ZITHROMAX) 500 mg tab Comments:   Reason for Stopping:         nystatin (MYCOSTATIN) 100,000 unit/mL suspension Comments:   Reason for Stopping:         guaiFENesin ER (MUCINEX) 1,200 mg Ta12 ER tablet Comments:   Reason for Stopping:         aspirin delayed-release 81 mg tablet Comments:   Reason for Stopping:         clopidogreL (Plavix) 75 mg tab Comments:   Reason for Stopping:         lidocaine 4 % patch Comments:   Reason for Stopping:               Procedures done this admission:  * No surgery found *    Consults this admission:  IP CONSULT TO PHYSIATRIST(REHAB MEDICINE)  IP CONSULT TO PHYSIATRIST(REHAB MEDICINE)  IP CONSULT TO CARDIOLOGY  IP CONSULT TO ORTHOPEDIC SURGERY    Echocardiogram/EKG results:  Results from Hospital Encounter encounter on 02/03/22    ECHO ADULT COMPLETE    Interpretation Summary    Left Ventricle: Left ventricle size is normal. Normal wall thickness. Normal wall motion. Low normal left ventricular systolic function. EF by 2D Simpsons Biplane is 52%. Normal diastolic function.   Aortic Valve: Mildly thickened cusps.   Mitral Valve: Mildly thickened leaflets. Mildly calcified anterior leaflet. Moderate to severe transvalvular regurgitation with an eccentrically directed jet and may underestimate severity. Transesophageal echo  recommended for further evaluation of potentially severe mitral regurgitation.   Aorta: Dilated annulus. EKG Results     Procedure 720 Value Units Date/Time    EKG, 12 LEAD, INITIAL [[id number]]     Order Status: Completed           Diagnostic Imaging/Tests:   XR HIP RT W OR WO PELV 2-3 VWS    Result Date: 3/15/2022  RIGHT HIP SERIES HISTORY:  Hip pain after fall COMPARISON: 2/4/2022 AP pelvis FINDINGS: Hardware secures a subacute intertrochanteric fracture of the right hip. There is medial displacement of the lesser trochanter since the prior postoperative film. Callus formation is present. There is no new displaced pelvic fracture. Postoperative changes from repair of subacute right hip fracture. Medial displacement of lesser trochanter. CT HEAD WO CONT    Result Date: 3/16/2022  EXAM: Noncontrast CT head. INDICATION: Follow-up intracranial hemorrhage. COMPARISON: Yesterday's CT head. TECHNIQUE: Axial noncontrast CT images of the head were obtained.   Radiation dose reduction techniques were used for this study.  Our CT scanners use one or all of the following:  Automated exposure control, adjustment of the mA and/or kV according to patient size, iterative reconstruction. FINDINGS: A 2 mm thick subdural hematoma along the outer margin of the right frontal lobe is unchanged. No new hemorrhage is identified. There is no acute infarct, mass effect or midline shift. The skull and skull base are intact. There is a persistent right scalp hematoma. Unchanged thin right subdural hematoma, with no mass effect or midline shift. CT HEAD WO CONT    Result Date: 3/15/2022  HEAD CT WITHOUT CONTRAST  3/15/2022 HISTORY:   Fall; closed head injury  Fall hit right forehead-Denies dizziness at the time -Neck pain on right side TECHNIQUE: Noncontrast axial images were obtained through the brain. All CT scans at this facility used dose modulation, interactive reconstruction and/or weight based dosing when appropriate to reduce radiation dose to as low as reasonably achievable. COMPARISON: None FINDINGS: A thin linear hyperdensity is present along the right temporal convexity. This may be a focal area of dural thickening or a thin smear subdural hematoma. There is no appreciable mass effect 1 the brain. . A right frontal scalp hematoma is present. There is no hydrocephalus , intra-axial mass. There are no displaced skull fractures. The mastoid air cells and paranasal sinuses are clear where imaged. 1. Right frontal scalp hematoma. 2. Thin extra-axial structure along the right frontal convexity measuring approximately 2 mm in thickness. This may be a very small subdural hematoma. The findings were called to Dr. Deb Cullen on 3/15/2022 at 2:48 PM by Dr. Isaac Travis. [facility] WO CONT    Result Date: 3/15/2022  CT CERVICAL SPINE WITHOUT CONTRAST HISTORY:  s/p fall;  Fall hit right forehead-Denies dizziness at the time -Neck pain on right side TECHNIQUE: Noncontrast axial images were obtained from the craniocervical junction through T2. Multiplanar reformatted images were generated. All CT scans at this facility used dose modulation, iterative reconstruction and/or weight based dosing when appropriate to reduce radiation dose to as low as reasonably achievable. COMPARISON:  None FINDINGS:  The cervical vertebrae are normal in height and alignment. There is no prevertebral soft tissue swelling. The articular facets overlap appropriately. Axial images demonstrate no displaced cervical spine fracture. Included portions of the lung  apices demonstrate centrilobular emphysematous disease. Biapical pleural parenchymal scarring is present. Degenerative spondylosis is present with uncovertebral osteophytes. Foraminal stenosis is present at the C5-C6 and C6-C7 levels. 1. No acute findings in the cervical spine. 2. Foraminal stenosis and degenerative spondylosis at C5-C6 and C6-C7.       All Micro Results     Procedure Component Value Units Date/Time    COVID-19 RAPID TEST [[id number]]     Order Status: Sent           Labs: Results:       BMP, Mg, Phos Recent Labs     03/18/22  0500 03/16/22  1215 03/15/22  1502    137 138   K 4.1 5.0 2.6*    105 97*   CO2 29 27 34*   AGAP 4* 5* 7   BUN 11 12 13   CREA 0.60 0.70 0.90   CA 9.3 9.3 9.4   * 117* 112*   MG  --  2.6* 1.8      CBC Recent Labs     03/17/22  1716 03/16/22  2017 03/15/22  1913 03/15/22  1502 03/15/22  1502   WBC 7.1 5.9 11.3*   < > 8.8   RBC 3.03* 3.07* 3.23*   < > 3.47*   HGB 9.3* 9.4* 9.9*   < > 10.6*   HCT 29.8* 30.6* 31.5*   < > 33.6*    238 245   < > 272   GRANS  --   --   --   --  89*   LYMPH  --   --   --   --  7*   EOS  --   --   --   --  0*   MONOS  --   --   --   --  4   BASOS  --   --   --   --  0   IG  --   --   --   --  1   ANEU  --   --   --   --  7.9   ABL  --   --   --   --  0.6   SANDHYA  --   --   --   --  0.0   ABM  --   --   --   --  0.3   ABB  --   --   --   --  0.0   AIG  --   --   --   --  0.0    < > = values in this interval not displayed.       LFT Recent Labs     03/15/22  1502   ALT 22      TP 7.4   ALB 3.5   GLOB 3.9*   AGRAT 0.9*      Cardiac Testing Lab Results   Component Value Date/Time     (H) 06/06/2019 06:57 PM     08/21/2013 06:25 PM    BNP 59 08/19/2013 04:31 PM    BNP 24 08/19/2013 10:50 AM    Troponin-I, Qt. 0.16 () 06/07/2019 03:52 PM    Troponin-I, Qt. 0.21 () 06/07/2019 10:46 AM    Troponin-I, Qt. 0.25 () 06/07/2019 04:52 AM      Coagulation Tests Lab Results   Component Value Date/Time    Prothrombin time 13.0 03/15/2022 03:02 PM    Prothrombin time 13.4 05/29/2019 08:52 AM    INR 1.0 03/15/2022 03:02 PM    INR 1.1 05/29/2019 08:52 AM    aPTT 27.8 03/15/2022 03:02 PM    aPTT 28.8 02/06/2022 06:46 PM      A1c No results found for: HBA1C, VMU4OTIA   Lipid Panel Lab Results   Component Value Date/Time    Cholesterol, total 151 07/01/2019 07:20 AM    HDL Cholesterol 65 (H) 07/01/2019 07:20 AM    LDL, calculated 72.8 07/01/2019 07:20 AM    VLDL, calculated 13.2 07/01/2019 07:20 AM    Triglyceride 66 07/01/2019 07:20 AM    CHOL/HDL Ratio 2.3 07/01/2019 07:20 AM      Thyroid Panel Lab Results   Component Value Date/Time    TSH 0.017 (L) 03/16/2022 12:15 PM    TSH 0.101 (L) 02/07/2022 05:07 PM    T4, Free 2.4 (H) 02/07/2022 05:07 PM    T4, Free 1.35 10/08/2015 12:03 PM        Most Recent UA Lab Results   Component Value Date/Time    Color YELLOW 06/07/2019 01:20 AM    Appearance CLOUDY 06/07/2019 01:20 AM    Specific gravity 1.016 06/07/2019 01:20 AM    pH (UA) 5.5 06/07/2019 01:20 AM    Protein NEGATIVE  06/07/2019 01:20 AM    Glucose NEGATIVE  06/07/2019 01:20 AM    Ketone 15 (A) 06/07/2019 01:20 AM    Bilirubin NEGATIVE  06/07/2019 01:20 AM    Blood NEGATIVE  06/07/2019 01:20 AM    Urobilinogen 0.2 06/07/2019 01:20 AM    Nitrites NEGATIVE  06/07/2019 01:20 AM    Leukocyte Esterase SMALL (A) 06/07/2019 01:20 AM    WBC 3-5 06/07/2019 01:20 AM    RBC 0-3 06/07/2019 01:20 AM    Epithelial cells 0-3 06/07/2019 01:20 AM    Bacteria 0 06/07/2019 01:20 AM    Casts 0-3 06/07/2019 01:20 AM          All Labs from Last 24 Hrs:  Recent Results (from the past 24 hour(s))   CBC W/O DIFF    Collection Time: 03/17/22  5:16 PM   Result Value Ref Range    WBC 7.1 4.3 - 11.1 K/uL    RBC 3.03 (L) 4.05 - 5.2 M/uL    HGB 9.3 (L) 11.7 - 15.4 g/dL    HCT 29.8 (L) 35.8 - 46.3 %    MCV 98.3 (H) 79.6 - 97.8 FL    MCH 30.7 26.1 - 32.9 PG    MCHC 31.2 (L) 31.4 - 35.0 g/dL    RDW 15.4 (H) 11.9 - 14.6 %    PLATELET 415 240 - 653 K/uL    MPV 9.8 9.4 - 12.3 FL    ABSOLUTE NRBC 0.00 0.0 - 0.2 K/uL   METABOLIC PANEL, BASIC    Collection Time: 03/18/22  5:00 AM   Result Value Ref Range    Sodium 138 136 - 145 mmol/L    Potassium 4.1 3.5 - 5.1 mmol/L    Chloride 105 98 - 107 mmol/L    CO2 29 21 - 32 mmol/L    Anion gap 4 (L) 7 - 16 mmol/L    Glucose 123 (H) 65 - 100 mg/dL    BUN 11 8 - 23 MG/DL    Creatinine 0.60 0.6 - 1.0 MG/DL    GFR est AA >60 >60 ml/min/1.73m2    GFR est non-AA >60 >60 ml/min/1.73m2    Calcium 9.3 8.3 - 10.4 MG/DL       Current Med List in Hospital:   Current Facility-Administered Medications   Medication Dose Route Frequency    ondansetron (ZOFRAN) injection 4 mg  4 mg IntraVENous Q6H PRN    acetaminophen (TYLENOL) tablet 650 mg  650 mg Oral Q4H PRN    acetaminophen (TYLENOL) suppository 650 mg  650 mg Rectal Q4H PRN    senna-docusate (PERICOLACE) 8.6-50 mg per tablet 2 Tablet  2 Tablet Oral QHS    bisacodyL (DULCOLAX) tablet 5 mg  5 mg Oral DAILY PRN    labetaloL (NORMODYNE;TRANDATE) injection 20 mg  20 mg IntraVENous Q4H PRN    LORazepam (ATIVAN) tablet 0.5 mg  0.5 mg Oral Q8H PRN    metoprolol succinate (TOPROL-XL) XL tablet 100 mg  100 mg Oral DAILY    polyethylene glycol (MIRALAX) packet 17 g  17 g Oral DAILY    nitroglycerin (NITROSTAT) tablet 0.4 mg  0.4 mg SubLINGual PRN    traZODone (DESYREL) tablet 50 mg  50 mg Oral QHS PRN    ezetimibe (ZETIA) tablet 10 mg  10 mg Oral DAILY    albuterol-ipratropium (DUO-NEB) 2.5 MG-0.5 MG/3 ML  3 mL Nebulization Q4H PRN    tiotropium bromide (SPIRIVA RESPIMAT) 2.5 mcg /actuation  2 Puff Inhalation DAILY    budesonide-formoteroL (SYMBICORT) 160-4.5 mcg/actuation HFA inhaler 2 Puff  2 Puff Inhalation BID RT    atorvastatin (LIPITOR) tablet 80 mg  80 mg Oral QHS    escitalopram oxalate (LEXAPRO) tablet 10 mg  10 mg Oral DAILY    pantoprazole (PROTONIX) tablet 40 mg  40 mg Oral ACB&D    oxyCODONE IR (ROXICODONE) tablet 5 mg  5 mg Oral Q4H PRN    gabapentin (NEURONTIN) capsule 100 mg  100 mg Oral TID    predniSONE (DELTASONE) tablet 15 mg  15 mg Oral DAILY WITH BREAKFAST    methIMAzole (TAPAZOLE) tablet 10 mg  10 mg Oral DAILY    azithromycin (ZITHROMAX) tablet 500 mg  500 mg Oral Q MON, WED & FRI       Allergies   Allergen Reactions    Promethazine Nausea and Vomiting and Other (comments)     Severe vomiting      Immunization History   Administered Date(s) Administered    COVID-19, Pfizer Purple top, DILUTE for use, 12+ yrs, 30mcg/0.3mL dose 01/28/2021, 02/18/2021, 09/25/2021    Influenza High Dose Vaccine PF 09/23/2014, 10/08/2015, 10/10/2016, 09/26/2017, 09/25/2018    Influenza Vaccine 10/01/2012, 09/27/2013    Influenza Vaccine (Tri) Adjuvanted (>65 Yrs FLUAD TRI [id number]) 09/20/2019    Influenza, High-dose, Quadrivalent (>65 Yrs Fluzone High Dose Quad [id number]) 10/06/2020, 09/14/2021    Pneumococcal Conjugate (PCV-13) 09/23/2014    Pneumococcal Polysaccharide (PPSV-23) 01/01/2008, 09/23/2014, 08/30/2018    TB Skin Test (PPD) Intradermal 02/04/2022, 02/27/2022    Tdap 11/07/2018    Zoster Recombinant 03/27/2019, 06/18/2019       Recent Vital Data:  Patient Vitals for the past 24 hrs:   Temp Pulse Resp BP SpO2   03/18/22 0826     96 %   03/18/22 0800 97.4 °F (36.3 °C) 79 18 127/60 95 %   03/18/22 0756 97.4 °F (36.3 °C) 79  127/60 95 %   03/18/22 0400 97.7 °F (36.5 °C) 71 18 122/60 97 %   03/18/22 0000 97.5 °F (36.4 °C) 70 17 136/72 97 %   03/17/22 2000 98.2 °F (36.8 °C) 77 18 (!) 110/53 96 %   03/17/22 1837     94 %   03/17/22 1600 98 °F (36.7 °C) 72 18 113/60 96 %   03/17/22 1200 97.3 °F (36.3 °C) 67 18 (!) 112/54 98 %   03/17/22 1140   18  98 %     Oxygen Therapy  O2 Sat (%): 96 % (03/18/22 0826)  Pulse via Oximetry: 88 beats per minute (03/18/22 0826)  O2 Device: Nasal cannula (03/18/22 0826)  Skin Assessment: Clean, dry, & intact (03/16/22 1900)  Skin Protection for O2 Device: N/A (03/16/22 1900)  O2 Flow Rate (L/min): 1 l/min (03/18/22 0826)    Estimated body mass index is 20.79 kg/m² as calculated from the following:    Height as of this encounter: 5' 8\" (1.727 m). Weight as of this encounter: 62 kg (136 lb 11.2 oz). No intake or output data in the 24 hours ending 03/18/22 0906      Physical Exam:    General:    Well nourished. No overt distress  Head:  Normocephalic, atraumatic  Eyes:  Sclerae appear normal.  Pupils equally round. HENT:  Nares appear normal, no drainage. Moist mucous membranes  Neck:  No restricted ROM. Trachea midline  CV:   RRR. No m/r/g. No JVD  Lungs:   CTAB. No wheezing, rhonchi, or rales. Even, unlabored  Abdomen:   Soft, nontender, nondistended. Extremities: Warm and dry. No cyanosis or clubbing. No edema. Skin:     No rashes. Normal coloration  Neuro:  CN II-XII grossly intact. Psych:  Normal mood and affect. Signed:  Priicla Felix NP    Part of this note may have been written by using a voice dictation software. The note has been proof read but may still contain some grammatical/other typographical errors.

## 2022-03-18 NOTE — H&P
Bree Quarles MD  Medical Director  [address] [facility], [address] [facility]  Tel: [phone]       Admission History and Physical Exam  Mackenzie Lopez Date: 3/18/2022    Primary Care Provider: Le Winn MD  Specialty Group / Referring Service: Hospitalist    Chief Complaint : \"How did I get myself back here? \"  Admitting Diagnosis:   SDH (subdural hematoma) ([facility] [address] [S06.5X9A]    Active Problems:    SDH (subdural hematoma) ([facility]) (3/18/2022)    -O2  Dependent severe COPD  -S/p recent right femur fx   -Chronic respiratory failure with hypoxia  -Centrilobular emphysema  -HTN  -Thyroid disease  -Subacute medial displacement of the right lesser trochanter s/p fall  -hx of falls  -NSTEMI s/p stent to the LAD  -Hypokalemia  -anemia    Acute Rehab Dx:  Gait impairment / gait dysfunction  Debility  Deconditioning  Mobility and ambulation deficits  Self care / ADL deficits    Medical Dx:  Past Medical History:   Diagnosis Date    Arrhythmia     Arthritis     CAD (coronary artery disease) 2009, 8/19/2013    PCI,  Orpha Alert     Carotid stenosis, right     endarterectomy 11/25/19    Chronic anxiety 4/27/2017    COPD     recent referral to [address] for lung transplant    Depression 5/6/2021    Emphysema lung ([facility] [address]     GERD (gastroesophageal reflux disease)     controlled with medication    History of echocardiogram 06/07/2019    History of echocardiogram     echo 06/07/19 LVEF 55-60%    Hypertension     Nausea & vomiting     Oxygen dependent     1 liter at night    Pleurisy     Thyroid disease     pt denies       Date of Evaluation: March 18, 2022    Pavan Torres is a 66 y.o. female patient at [address] [place] [address] who was admitted to [address] [place] [address] on 3/18/2022 by Bree Quarles MD of Physical Medicine and Rehab service with below-mentioned medical history.     HPI: The patient is a r-hand dominant, previously modified indep 66yo female known to me from [facility] stay in Feb s/p hip fx. She has a PMH of severe O2 dependent COPD,CAD status post PCI, chronic respiratory failure with hypoxia, centrilobular emphysema, hypertension, dependent at 1 L at night, pleurisy, thyroid disease, anxiety, arthritis, GERD, fall status post right femur fracture February 2022 and right carotid stenosis status post atherectomy in 2019.  Patient had a fall and presents to the emergency department with a right scalp hematoma.  The fall was reported as mechanical hitting the right side of the head and right hip.  Patient states she fell while on oxygen and tripped over tubing. CT of the head without contrast showed a small left temporal convexity with a subdural hematoma in the right without any evidence of brain compression or midline shift.  Right hip shows repaired subacute right hip fracture and a medial displacement of the lesser trochanter. She was seen by ortho and can remain WBAT with no surgical intervention required. Neurosurgery was consulted with no acute intervention necessary with recommendation of repeat head CTs without contrast and to hold all anticoagulant antiplatelets.  Patient denied any neuro changes at that time.  Repeat CT of the head this morning showed no change from previous CT.    Cardiology was consulted due to being on Plavix and ASA s/p recent NSTEM with complicated stenting to the LAD. They recommended cont ASA and only holding Plavix for less than 3-4d.   F/u Head CT; Unchanged thin right subdural hematoma, with no mass effect or  midline shift. Pt receiving K for hypokalemia with a K of 2. 6.   hgb 9.8 WBC 11.3, creat .9  3/18 K 4.1, creat 0.6, Na 138, hbg 9.3    Physical Medicine and Rehab service was consulted, and patient was initially evaluated by Dr. Jhonny Almanza on 3/16/2022    During reevaluation earlier today, patient shows improvement from initial consult but still shows significant functional deficits. We recommend inpatient hospital rehabilitation as reasonable and necessary due to ongoing acute medical issues which have not changed since initial pre-admission evaluation. We reviewed the preadmission screening and have approved the patient for admission to the [facility]. Attending service cleared patient for transfer to rehab today. Rapid COVID was negative. Patient continues to have ongoing medical issues outlined above requiring physician / PA medical supervision and has functional deficits which would benefit from continued intensive therapies. Current Level of Function: (evaluated by acute therapy staff, with bed mobility, transfers, balance personally observed post-admission in the [facility] setting minutes prior to submission of document)       Prior Home Situation:   Private residence  # Steps to Enter: 1  Rails to [employer]: No  One/Two Story Residence: One story  Living Alone: No  Support Systems: Spouse/Significant Other  Patient Expects to be Discharged to[de-identified] Home with family assistance  Current DME Used/Available at Home: Walker,Wheelchair  Tub or Shower Type: Shower (door. No bars)    Previous Level of Function / Work / Activity:    Patient reporting she was ambulating short distances with RW inside home. Reports ambulation was limited due to COPD.  Reports significant other was assisting her with ADLs with compression stockings and set up assist with supervision for bathing and dressing        Past Medical History:   Diagnosis Date    Arrhythmia     Arthritis     CAD (coronary artery disease) 2009, 8/19/2013    PCI,  Sriram Willis     Carotid stenosis, right     endarterectomy 11/25/19    Chronic anxiety 4/27/2017    COPD     recent referral to [facility] for lung transplant    Depression 5/6/2021    Emphysema lung ([facility] [address]     GERD (gastroesophageal reflux disease)     controlled with medication    History of echocardiogram 06/07/2019    History of echocardiogram     echo 06/07/19 LVEF 55-60%  Hypertension     Nausea & vomiting     Oxygen dependent     1 liter at night    Pleurisy     Thyroid disease     pt denies      Past Surgical History:   Procedure Laterality Date    HX GYN      rebuilt cervix    HX HEART CATHETERIZATION  2019    last stent- per patient- 10 stents total    HX OTHER SURGICAL      HX TONSIL AND ADENOIDECTOMY      VASCULAR SURGERY PROCEDURE UNLIST Right 2019    carotid endarterectomy     Allergies   Allergen Reactions    Promethazine Nausea and Vomiting and Other (comments)     Severe vomiting       Family History   Problem Relation Age of Onset    No Known Problems Mother         adopted      Social History     Tobacco Use    Smoking status: Former Smoker     Packs/day: 0.75     Years: 53.00     Pack years: 39.75     Types: Cigarettes     Quit date:      Years since quittin.2    Smokeless tobacco: Never Used   Substance Use Topics    Alcohol use: Yes     Comment: rarely      Prior to Admission Medications   Prescriptions Last Dose Informant Patient Reported? Taking? LORazepam (ATIVAN) 0.5 mg tablet   No No   Sig: Take 1 Tablet by mouth every eight (8) hours as needed for Anxiety or Agitation. Max Daily Amount: 1.5 mg. PCP will prescribe this medication if it is necessary to continue. OTHER   No No   Sig: Handicap placard   OXYGEN-AIR DELIVERY SYSTEMS   Yes No   Si L/min by Nasal route continuous. acetaminophen (TYLENOL) 325 mg tablet   No No   Sig: Take 2 Tablets by mouth every eight (8) hours. Indications: for hip surgery pain   Patient not taking: Reported on 3/16/2022   albuterol (PROVENTIL VENTOLIN) 2.5 mg /3 mL (0.083 %) nebu   No No   Sig: 3 mL by Nebulization route every six (6) hours as needed for Shortness of Breath.  ICD-10-J44.9, please bill to Med B   atorvastatin (LIPITOR) 80 mg tablet   No No   Sig: TAKE 1 TABLET BY MOUTH DAILY   budesonide-formoteroL (Symbicort) 160-4.5 mcg/actuation HFAA   No No   Sig: Take 2 Puffs by inhalation two (2) times a day. cetirizine (ZYRTEC) 10 mg tablet   Yes No   Sig: Take 10 mg by mouth daily as needed for Allergies. escitalopram oxalate (LEXAPRO) 10 mg tablet   No No   Sig: Take 1 Tablet by mouth daily. PCP will continue to prescribe this medication. Indications: anxiousness associated with depression   ezetimibe (ZETIA) 10 mg tablet   No No   Sig: Take 1 Tablet by mouth daily. gabapentin (NEURONTIN) 100 mg capsule   No No   Sig: Take 1 Capsule by mouth three (3) times daily for 30 days. magnesium oxide (MAG-OX) 400 mg tablet   No No   Sig: Take 1 Tablet by mouth daily for 20 doses. methIMAzole (TAPAZOLE) 10 mg tablet   No No   Sig: Take 1 Tablet by mouth daily. PCP will continue to prescribe this medication. Indications: overactive thyroid gland   metoprolol succinate (TOPROL-XL) 100 mg tablet   No No   Sig: Take 1 Tablet by mouth daily. multivitamin (ONE A DAY) tablet   Yes No   Sig: Take 1 Tablet by mouth daily. nitroglycerin (NITROSTAT) 0.4 mg SL tablet   No No   Si Tablet by SubLINGual route three (3) times daily as needed for Chest Pain. Up to 3 doses. Patient taking differently: 1 Tablet by SubLINGual route three (3) times daily as needed for Chest Pain. Up to 3 doses for chest pain. then call 911   pantoprazole (PROTONIX) 40 mg tablet   No No   Sig: TAKE 1 TABLET BY MOUTH TWICE DAILY   polyethylene glycol (Miralax) 17 gram/dose powder   No No   Sig: Take 17 g by mouth daily. Indications: constipation   Patient not taking: Reported on 2022   predniSONE (DELTASONE) 5 mg tablet   Yes No   Sig: Take 15 mg by mouth daily. roflumilast (Daliresp) 500 mcg tab tablet   No No   Sig: Take 1 Tablet by mouth daily. senna-docusate (PERICOLACE) 8.6-50 mg per tablet   No No   Sig: Take 2 Tablets by mouth daily.  Indications: constipation   Patient not taking: Reported on 2022   tiotropium (Spiriva with HandiHaler) 18 mcg inhalation capsule   No No   Sig: Take 1 Cap by inhalation daily. traZODone (DESYREL) 50 mg tablet   No No   Sig: Take 0.5 Tablets by mouth nightly as needed for Sleep. PCP will prescribe this medication if it is necessary to continue.   Indications: insomnia associated with depression   Patient not taking: Reported on 3/16/2022      Facility-Administered Medications: None     Current Facility-Administered Medications   Medication Dose Route Frequency    azithromycin (ZITHROMAX) tablet 500 mg  500 mg Oral Q MON, WED & FRI    budesonide-formoteroL (SYMBICORT) 160-4.5 mcg/actuation HFA inhaler 2 Puff  2 Puff Inhalation BID RT    [START ON 3/19/2022] escitalopram oxalate (LEXAPRO) tablet 10 mg  10 mg Oral DAILY    [START ON 3/19/2022] ezetimibe (ZETIA) tablet 10 mg  10 mg Oral DAILY    gabapentin (NEURONTIN) capsule 100 mg  100 mg Oral TID    LORazepam (ATIVAN) tablet 0.5 mg  0.5 mg Oral Q8H PRN    [START ON 3/19/2022] methIMAzole (TAPAZOLE) tablet 10 mg  10 mg Oral DAILY    [START ON 3/19/2022] metoprolol succinate (TOPROL-XL) XL tablet 100 mg  100 mg Oral DAILY    nitroglycerin (NITROSTAT) tablet 0.4 mg  0.4 mg SubLINGual PRN    ondansetron (ZOFRAN ODT) tablet 4 mg  4 mg Oral Q6H PRN    oxyCODONE IR (ROXICODONE) tablet 5 mg  5 mg Oral Q4H PRN    pantoprazole (PROTONIX) tablet 40 mg  40 mg Oral ACB&D    [START ON 3/19/2022] polyethylene glycol (MIRALAX) packet 17 g  17 g Oral DAILY    [START ON 3/19/2022] predniSONE (DELTASONE) tablet 15 mg  15 mg Oral DAILY WITH BREAKFAST    [START ON 3/19/2022] tiotropium bromide (SPIRIVA RESPIMAT) 2.5 mcg /actuation  2 Puff Inhalation DAILY    traZODone (DESYREL) tablet 50 mg  50 mg Oral QHS PRN    acetaminophen (TYLENOL) tablet 650 mg  650 mg Oral Q4H PRN    atorvastatin (LIPITOR) tablet 80 mg  80 mg Oral QHS    bisacodyL (DULCOLAX) tablet 5 mg  5 mg Oral DAILY PRN    senna-docusate (PERICOLACE) 8.6-50 mg per tablet 2 Tablet  2 Tablet Oral QHS       Review of Systems:  General: Denies: fevers, chills, sweats,  malaise, anorexia, weight loss +fatigue  Eyes:  Denies: blurry vision, loss of vision, eye pain, photophobia   HENT:  Denies: hearing loss, tinnitus, earache, epistaxis, sore throat, hoarseness  Lungs: Denies: cough, wheeze, asthma, hemoptysis, dyspnea +severe COPD, O2 dependent, SPAULDING  CV:  Denies: chest pain, palpitations, syncope, orthopnea, paroxysmal nocturnal dyspnea, claudication + NSTEMI Feb 2022 LAD stent  GI:  Denies: dysphagia, odynophagia, nausea, vomiting, diarrhea, constipation, abdominal pain, jaundice, melena   :  Denies: frequency, dysuria, nocturia, urinary incontinence, stones, hematuria   Endocrine: Denies: polydipsia/polyuria, skin changes, temperature intolerance, unexpected weight gain or loss  MSK:  Denies:  joint pain, joint swelling, muscle pain, + back pain and muscle weakness   Heme:  Denies: bleeding problems, blood transfusions, bruising, pallor, swollen lymph nodes   Neuro:  Denies: headache, dysarthria, blurred vision, diplopia, seizure, memory problems, speech problems, gait problems, coordination problems      Objective: There were no vitals taken for this visit. Intake and Output:  No intake/output data recorded. Physical Exam:   General:  Alert, appears stated age. No acute distress. HEENT:  Normocephalic, EOM intact, facial symmetry noted. Nares patent, oral mucosa moist without lesions. Lungs:  Clear to auscultation bilaterally. Respirations even and unlabored. Heart:  Regular rate and underlying rhythm, S1, S2. No obvious murmur, click, rub or gallop. Genitourinary: Deferred. Abdomen:  Soft, non-tender, not distended. Bowel sounds normoactive. No obvious masses or organomegaly palpated.     Neuromuscular:  Non focal prox>distal weakness x right hip flexion 4-/5, 4+ on the left  DF/PF 5/5  Sensation intact  Thought processing slightly delayed  PERRLA EOMI  Speech and conv appropriate     Skin:  Intact, dry, good skin turgor, age related changes present   Edema: none  Well healed right hip inc  Multiple bruises on UEs         Psych: Patient was oriented to person, place, and time. Without hallucinations, abnormal affect or abnormal behaviors during the examination. Recent Results (from the past 72 hour(s))   CBC WITH AUTOMATED DIFF    Collection Time: 03/15/22  3:02 PM   Result Value Ref Range    WBC 8.8 4.3 - 11.1 K/uL    RBC 3.47 (L) 4.05 - 5.2 M/uL    HGB 10.6 (L) 11.7 - 15.4 g/dL    HCT 33.6 (L) 35.8 - 46.3 %    MCV 96.8 79.6 - 97.8 FL    MCH 30.5 26.1 - 32.9 PG    MCHC 31.5 31.4 - 35.0 g/dL    RDW 15.5 (H) 11.9 - 14.6 %    PLATELET 104 240 - 022 K/uL    MPV 9.7 9.4 - 12.3 FL    ABSOLUTE NRBC 0.00 0.0 - 0.2 K/uL    DF AUTOMATED      NEUTROPHILS 89 (H) 43 - 78 %    LYMPHOCYTES 7 (L) 13 - 44 %    MONOCYTES 4 4.0 - 12.0 %    EOSINOPHILS 0 (L) 0.5 - 7.8 %    BASOPHILS 0 0.0 - 2.0 %    IMMATURE GRANULOCYTES 1 0.0 - 5.0 %    ABS. NEUTROPHILS 7.9 1.7 - 8.2 K/UL    ABS. LYMPHOCYTES 0.6 0.5 - 4.6 K/UL    ABS. MONOCYTES 0.3 0.1 - 1.3 K/UL    ABS. EOSINOPHILS 0.0 0.0 - 0.8 K/UL    ABS. BASOPHILS 0.0 0.0 - 0.2 K/UL    ABS. IMM. GRANS. 0.0 0.0 - 0.5 K/UL   METABOLIC PANEL, COMPREHENSIVE    Collection Time: 03/15/22  3:02 PM   Result Value Ref Range    Sodium 138 136 - 145 mmol/L    Potassium 2.6 (L) 3.5 - 5.1 mmol/L    Chloride 97 (L) 98 - 107 mmol/L    CO2 34 (H) 21 - 32 mmol/L    Anion gap 7 7 - 16 mmol/L    Glucose 112 (H) 65 - 100 mg/dL    BUN 13 8 - 23 MG/DL    Creatinine 0.90 0.6 - 1.0 MG/DL    GFR est AA >60 >60 ml/min/1.73m2    GFR est non-AA >60 >60 ml/min/1.73m2    Calcium 9.4 8.3 - 10.4 MG/DL    Bilirubin, total 0.6 0.2 - 1.1 MG/DL    ALT (SGPT) 22 12 - 65 U/L    AST (SGOT) 18 15 - 37 U/L    Alk.  phosphatase 136 50 - 136 U/L    Protein, total 7.4 6.3 - 8.2 g/dL    Albumin 3.5 3.2 - 4.6 g/dL    Globulin 3.9 (H) 2.3 - 3.5 g/dL    A-G Ratio 0.9 (L) 1.2 - 3.5     PROTHROMBIN TIME + INR    Collection Time: 03/15/22  3:02 PM   Result Value Ref Range Prothrombin time 13.0 12.6 - 14.5 sec    INR 1.0     PTT    Collection Time: 03/15/22  3:02 PM   Result Value Ref Range    aPTT 27.8 24.1 - 35.1 SEC   MAGNESIUM    Collection Time: 03/15/22  3:02 PM   Result Value Ref Range    Magnesium 1.8 1.8 - 2.4 mg/dL   CBC W/O DIFF    Collection Time: 03/15/22  7:13 PM   Result Value Ref Range    WBC 11.3 (H) 4.3 - 11.1 K/uL    RBC 3.23 (L) 4.05 - 5.2 M/uL    HGB 9.9 (L) 11.7 - 15.4 g/dL    HCT 31.5 (L) 35.8 - 46.3 %    MCV 97.5 79.6 - 97.8 FL    MCH 30.7 26.1 - 32.9 PG    MCHC 31.4 31.4 - 35.0 g/dL    RDW 15.4 (H) 11.9 - 14.6 %    PLATELET 564 199 - 876 K/uL    MPV 9.4 9.4 - 12.3 FL    ABSOLUTE NRBC 0.00 0.0 - 0.2 K/uL   METABOLIC PANEL, BASIC    Collection Time: 03/16/22 12:15 PM   Result Value Ref Range    Sodium 137 136 - 145 mmol/L    Potassium 5.0 3.5 - 5.1 mmol/L    Chloride 105 98 - 107 mmol/L    CO2 27 21 - 32 mmol/L    Anion gap 5 (L) 7 - 16 mmol/L    Glucose 117 (H) 65 - 100 mg/dL    BUN 12 8 - 23 MG/DL    Creatinine 0.70 0.6 - 1.0 MG/DL    GFR est AA >60 >60 ml/min/1.73m2    GFR est non-AA >60 >60 ml/min/1.73m2    Calcium 9.3 8.3 - 10.4 MG/DL   MAGNESIUM    Collection Time: 03/16/22 12:15 PM   Result Value Ref Range    Magnesium 2.6 (H) 1.8 - 2.4 mg/dL   TSH 3RD GENERATION    Collection Time: 03/16/22 12:15 PM   Result Value Ref Range    TSH 0.017 (L) 0.358 - 3.740 uIU/mL   CBC W/O DIFF    Collection Time: 03/16/22  8:17 PM   Result Value Ref Range    WBC 5.9 4.3 - 11.1 K/uL    RBC 3.07 (L) 4.05 - 5.2 M/uL    HGB 9.4 (L) 11.7 - 15.4 g/dL    HCT 30.6 (L) 35.8 - 46.3 %    MCV 99.7 (H) 79.6 - 97.8 FL    MCH 30.6 26.1 - 32.9 PG    MCHC 30.7 (L) 31.4 - 35.0 g/dL    RDW 15.6 (H) 11.9 - 14.6 %    PLATELET 858 359 - 850 K/uL    MPV 9.9 9.4 - 12.3 FL    ABSOLUTE NRBC 0.00 0.0 - 0.2 K/uL   CBC W/O DIFF    Collection Time: 03/17/22  5:16 PM   Result Value Ref Range    WBC 7.1 4.3 - 11.1 K/uL    RBC 3.03 (L) 4.05 - 5.2 M/uL    HGB 9.3 (L) 11.7 - 15.4 g/dL    HCT 29.8 (L) 35.8 - 46.3 %    MCV 98.3 (H) 79.6 - 97.8 FL    MCH 30.7 26.1 - 32.9 PG    MCHC 31.2 (L) 31.4 - 35.0 g/dL    RDW 15.4 (H) 11.9 - 14.6 %    PLATELET 907 964 - 129 K/uL    MPV 9.8 9.4 - 12.3 FL    ABSOLUTE NRBC 0.00 0.0 - 0.2 K/uL   METABOLIC PANEL, BASIC    Collection Time: 03/18/22  5:00 AM   Result Value Ref Range    Sodium 138 136 - 145 mmol/L    Potassium 4.1 3.5 - 5.1 mmol/L    Chloride 105 98 - 107 mmol/L    CO2 29 21 - 32 mmol/L    Anion gap 4 (L) 7 - 16 mmol/L    Glucose 123 (H) 65 - 100 mg/dL    BUN 11 8 - 23 MG/DL    Creatinine 0.60 0.6 - 1.0 MG/DL    GFR est AA >60 >60 ml/min/1.73m2    GFR est non-AA >60 >60 ml/min/1.73m2    Calcium 9.3 8.3 - 10.4 MG/DL   COVID-19 RAPID TEST    Collection Time: 03/18/22 10:37 AM   Result Value Ref Range    Specimen source NASAL      COVID-19 rapid test Not detected NOTD           Condition on Admission: GOOD      S/p traumatic SDH  Assessment:     The Post Assessment Physician Evaluation (JEANNIE) found the current functional status to be comparable with the pre-admission screening. The patient is a good candidate for acute inpatient rehabilitation. Nothing since the pre-admission screen has changed that determination. Rehabilitation Plan  The patient has shown the ability to tolerate and benefit from 3 hours of therapy daily and is being admitted to a comprehensive acute inpatient rehabilitation program consisting of at least 3 hours of combined physical and occupational therapies. - Begin intensive Physical Therapy for a minimum of 1.5 hours a day, at least 5 out of 7 days per week to address bed mobility, transfers, ambulation, strengthening, balance, and endurance. - Begin intensive Occupational Therapy for a minimum of 1.5 hours a day, at least 5 out of 7 days per week to address ADLs (bathing, LE dressing, toileting) and adaptive equipment as needed.   - Continue Speech Therapy for:higher level cognition; therapy schedule may be adjusted by MD based on patient's needs. Each of these therapies will be continued as above for the duration of the inpatient rehab stay. The patient will also require 24-hour skilled rehabilitation nursing for bowel and bladder management, skin care for decubitus ulcer prevention, pain management and ongoing medication administration. S/p traumatic SDH  Plan / Recommendations / Medical Decision Making:     Daily physician / PA medical management:    SDH (subdural hematoma) ([facility] [address] [S06.5X9A] - PT/OT ; good rehab potential . Will ask ST to see as well to assess higher level cognitive functioning. S/p Right IT fx Feb 2022 now s/p fall with fx of the lesser trochanter; WBAT. No surgery required per Dr Maggy Light ;cont lipitor and Zetia    COPD; O2 and steroid dependent. 1-2 L O2. Cont to enc IS. Cont sybicort, 15mg prednisone and Spiriva; there was concern when hospitalized at the end of Feb 2022 of pulmonary malignancy with a 2.8cm spiclated mass in the left perihilar lower lobe. This is per CT chest 2/26 2022. She is on prophylactic azithromycin for inflammation due to COPD/emphysema  MWF    Hypokalemia ; resolved; monitor and replace if needed    Hypertension - BP fluctuating, managed medically. Cont toprol XL    Anemia; hgb 9.3 and stable    Hx of MI s/p stent to the LAD and distal right coronary artery  Feb 7,2022; cont statin, was on Effient at home. Plans to restart ASA and begin Plavix; will wait 2 weeks post SDH. This was a very small bleed. Will restart ASA now , 81mg. Monitor clinically. Seen by Dr Stacy Gallegos 3/16. Pain management -fairly recent right hip fx, now. Will require regular pain assessment and comprehensive pain management. Prn louis and tylenol; hx of T12-L1 compression fxs    Pneumonia prophylaxis - incentive spirometer every hour while awake. DVT risk / DVT prophylaxis - daily physician / PA exam to assess as patient is at increased risk for of thromboembolism. Mobilize as tolerated.  Sequential pneumatic compression devices (SCDs) when in bed; thigh-high or knee-high thromboembolic deterrent hose when out of bed. no anticoag due to SDH    GERD - resume PPI bid. At times may need additional antacids, Maalox prn. Depression - continue on Lexapro. At risk of depression exacerbation due to re hospitalization and decline in functional mobility and independence. Add back prn ativan for anxiety    General skin care / wound prevention - monitor  i general skin  status daily per staff and physician / PA. At risk for failure due to impaired mobility. Pt with multiple bruises and friable skin    Bladder program; voiding without difficulty    Bowel program - at risk for constipation as a side effect of opioids, other medications, impaired mobility, etc. MiraLAX daily for regularity, Marisela-Colace for stool softener. PRN MOM, bisacodyl suppository or tablets for constipation. Hyperthyroidism; cont Tapazole          Disposition: The patient's prognosis for significant practical improvement within a reasonable period of time appears good and the estimated length of stay is 10 days; patient is expected to return home with ex spouse / family supervision and continued rehabilitation with home health therapy. Given the patient's complex neurologic / medical condition, risks of further medical complications include but are not limited to: thromboembolism / pulmonary embolism, skin breakdown, pneumonia due to decreased mobility, CVA, MI, cardiac arrhythmias due to HTN, postural hypotension. For these ongoing medical issues, rehabilitation services could not be safely provided at a lower level of care such as a skilled nursing facility or nursing home.         Signed By: Eileen Villafuerte MD     March 18, 2022

## 2022-03-18 NOTE — PROGRESS NOTES
Chart reviewed. Pt known to CM from resent admission to [facility] in February 2022. BSN, CM met with pt at bedside wearing the appropriate PPE with social distance. Pt in bed. Pt alert and oriented to person, place, time, and situation. Pt verified demographic information. PCP verified as Dr. Beyer and pt was last seen by PCP on 3/3/2022. Pt lives in [place] with ex-spouse, 1 steps to enter into the home. Pt reports being independent with ADLs but having problems with bathing r/t SOB. Pt has to take frequent breaks and ex-spouse Yue Arias will help when needed). Pt reports she feels like she was more independent then last admission before fall that had pt return to the hospital. Pt driving prior to admission. Pt reports having DMEs such as RW, wheelchair, cane and oxygen (portable and concentrator) from [facility]. Pt uses 1L oxygen at all times. Pt family able to transport home, to doctor apt,  medications, and get groceries. Pt primary pharmacy is [facility] on [address]. Pt reports being able to afford medications. Pt reports no availability for family to be at home with pt 24 hours. Pt ex-spouse works from home 3-11 pm but has to go to office DT for double shift on Thursdays. Pt has daughter in [place] that helps when visiting but not here often. Pt reports plans to discharge home. Pt has used [facility] and would like to resume their services at d/c if needed. Pt prefers to just use PT services. Pt has never been to SNF. Pt has used outpatient therapy with [facility] in the past. Pt aware of Wednesday Team meetings and would like family (ex-spouse, Yue Arias) updated about meeting. CM to continue to follow and monitor for any needs that may occur. Care Management Interventions  PCP Verified by CM: Yes (Dr. Darshan Conte)  Mode of Transport at Discharge:  Other (see comment) (family )  Transition of Care Consult (CM Consult): Discharge Planning  Discharge Durable Medical Equipment: No  Physical Therapy Consult: Yes  Occupational Therapy Consult: Yes  Speech Therapy Consult: No  Support Systems: Spouse/Significant Other,Child(raghavendra)  Confirm Follow Up Transport: Family  The Plan for Transition of Care is Related to the Following Treatment Goals : Return to baseline  The Patient and/or Patient Representative was Provided with a Choice of Provider and Agrees with the Discharge Plan?: Yes  Name of the Patient Representative Who was Provided with a Choice of Provider and Agrees with the Discharge Plan: pt  Freedom of Choice List was Provided with Basic Dialogue that Supports the Patient's Individualized Plan of Care/Goals, Treatment Preferences and Shares the Quality Data Associated with the Providers?: Yes   Resource Information Provided?: No  Discharge Location  Patient Expects to be Discharged to[de-identified] Home with home health (anticipated plan )

## 2022-03-19 PROBLEM — S72.001A CLOSED RIGHT HIP FRACTURE (HCC): Status: ACTIVE | Noted: 2022-02-04

## 2022-03-19 PROBLEM — F41.9 CHRONIC ANXIETY: Status: ACTIVE | Noted: 2017-04-27

## 2022-03-19 PROBLEM — I25.10 CAD S/P PERCUTANEOUS CORONARY ANGIOPLASTY: Status: ACTIVE | Noted: 2019-05-31

## 2022-03-19 PROBLEM — R91.8 PULMONARY MASS: Status: ACTIVE | Noted: 2020-02-11

## 2022-03-19 PROBLEM — J96.11 CHRONIC RESPIRATORY FAILURE WITH HYPOXIA (HCC): Status: ACTIVE | Noted: 2019-06-08

## 2022-03-19 PROBLEM — J43.2 CENTRILOBULAR EMPHYSEMA (HCC): Status: ACTIVE | Noted: 2019-12-20

## 2022-03-19 PROBLEM — S72.91XA FEMUR FRACTURE, RIGHT (HCC): Status: ACTIVE | Noted: 2022-02-11

## 2022-03-19 PROBLEM — Z66 DNR (DO NOT RESUSCITATE): Status: ACTIVE | Noted: 2021-11-09

## 2022-03-19 PROBLEM — Z98.61 CAD S/P PERCUTANEOUS CORONARY ANGIOPLASTY: Status: ACTIVE | Noted: 2019-05-31

## 2022-03-19 PROBLEM — J44.0 COPD WITH ACUTE LOWER RESPIRATORY INFECTION (HCC): Status: ACTIVE | Noted: 2022-01-01

## 2022-03-19 PROBLEM — M54.50 ACUTE BILATERAL LOW BACK PAIN WITHOUT SCIATICA: Status: ACTIVE | Noted: 2021-11-09

## 2022-03-19 PROBLEM — Z98.890 H/O CAROTID ENDARTERECTOMY: Status: ACTIVE | Noted: 2020-09-21

## 2022-03-19 PROBLEM — I65.29 CAROTID STENOSIS: Status: ACTIVE | Noted: 2019-12-03

## 2022-03-19 PROBLEM — H92.01 RIGHT EAR PAIN: Status: ACTIVE | Noted: 2020-09-21

## 2022-03-19 PROBLEM — S72.8X1A OTHER FRACTURE OF RIGHT FEMUR, INITIAL ENCOUNTER FOR CLOSED FRACTURE (HCC): Status: ACTIVE | Noted: 2022-01-01

## 2022-03-19 PROBLEM — Z51.5 PALLIATIVE CARE PATIENT: Status: ACTIVE | Noted: 2021-07-14

## 2022-03-19 PROBLEM — R94.31 EKG, ABNORMAL: Status: ACTIVE | Noted: 2022-02-15

## 2022-03-19 PROCEDURE — 97110 THERAPEUTIC EXERCISES: CPT

## 2022-03-19 PROCEDURE — 97116 GAIT TRAINING THERAPY: CPT

## 2022-03-19 PROCEDURE — 77010033678 HC OXYGEN DAILY

## 2022-03-19 PROCEDURE — 97165 OT EVAL LOW COMPLEX 30 MIN: CPT

## 2022-03-19 PROCEDURE — 94760 N-INVAS EAR/PLS OXIMETRY 1: CPT

## 2022-03-19 PROCEDURE — 97530 THERAPEUTIC ACTIVITIES: CPT

## 2022-03-19 PROCEDURE — 3331090002 HH PPS REVENUE DEBIT

## 2022-03-19 PROCEDURE — 99232 SBSQ HOSP IP/OBS MODERATE 35: CPT | Performed by: PHYSICAL MEDICINE & REHABILITATION

## 2022-03-19 PROCEDURE — 74011250637 HC RX REV CODE- 250/637: Performed by: PHYSICAL MEDICINE & REHABILITATION

## 2022-03-19 PROCEDURE — 2709999900 HC NON-CHARGEABLE SUPPLY

## 2022-03-19 PROCEDURE — 97535 SELF CARE MNGMENT TRAINING: CPT

## 2022-03-19 PROCEDURE — 74011636637 HC RX REV CODE- 636/637: Performed by: PHYSICAL MEDICINE & REHABILITATION

## 2022-03-19 PROCEDURE — 65310000000 HC RM PRIVATE REHAB

## 2022-03-19 PROCEDURE — 94640 AIRWAY INHALATION TREATMENT: CPT

## 2022-03-19 PROCEDURE — 3331090001 HH PPS REVENUE CREDIT

## 2022-03-19 RX ADMIN — ATORVASTATIN CALCIUM 80 MG: 80 TABLET, FILM COATED ORAL at 21:51

## 2022-03-19 RX ADMIN — GUAIFENESIN 600 MG: 600 TABLET ORAL at 21:51

## 2022-03-19 RX ADMIN — ESCITALOPRAM OXALATE 10 MG: 10 TABLET ORAL at 08:27

## 2022-03-19 RX ADMIN — BUDESONIDE AND FORMOTEROL FUMARATE DIHYDRATE 2 PUFF: 160; 4.5 AEROSOL RESPIRATORY (INHALATION) at 07:25

## 2022-03-19 RX ADMIN — PANTOPRAZOLE SODIUM 40 MG: 40 TABLET, DELAYED RELEASE ORAL at 16:26

## 2022-03-19 RX ADMIN — OXYCODONE HYDROCHLORIDE 5 MG: 5 TABLET ORAL at 21:52

## 2022-03-19 RX ADMIN — OXYCODONE HYDROCHLORIDE 5 MG: 5 TABLET ORAL at 08:27

## 2022-03-19 RX ADMIN — GABAPENTIN 100 MG: 100 CAPSULE ORAL at 21:52

## 2022-03-19 RX ADMIN — GABAPENTIN 100 MG: 100 CAPSULE ORAL at 08:27

## 2022-03-19 RX ADMIN — LORAZEPAM 0.5 MG: 0.5 TABLET ORAL at 16:40

## 2022-03-19 RX ADMIN — METOPROLOL SUCCINATE 100 MG: 50 TABLET, EXTENDED RELEASE ORAL at 08:27

## 2022-03-19 RX ADMIN — ASPIRIN 81 MG: 81 TABLET, CHEWABLE ORAL at 08:27

## 2022-03-19 RX ADMIN — GUAIFENESIN 600 MG: 600 TABLET ORAL at 08:27

## 2022-03-19 RX ADMIN — PREDNISONE 15 MG: 10 TABLET ORAL at 08:27

## 2022-03-19 RX ADMIN — LORAZEPAM 0.5 MG: 0.5 TABLET ORAL at 04:55

## 2022-03-19 RX ADMIN — PANTOPRAZOLE SODIUM 40 MG: 40 TABLET, DELAYED RELEASE ORAL at 05:00

## 2022-03-19 RX ADMIN — EZETIMIBE 10 MG: 10 TABLET ORAL at 08:27

## 2022-03-19 RX ADMIN — TIOTROPIUM BROMIDE INHALATION SPRAY 2 PUFF: 3.12 SPRAY, METERED RESPIRATORY (INHALATION) at 07:25

## 2022-03-19 RX ADMIN — TRAZODONE HYDROCHLORIDE 50 MG: 50 TABLET ORAL at 21:54

## 2022-03-19 RX ADMIN — METHIMAZOLE 10 MG: 5 TABLET ORAL at 08:27

## 2022-03-19 RX ADMIN — OXYCODONE HYDROCHLORIDE 5 MG: 5 TABLET ORAL at 16:29

## 2022-03-19 RX ADMIN — BUDESONIDE AND FORMOTEROL FUMARATE DIHYDRATE 2 PUFF: 160; 4.5 AEROSOL RESPIRATORY (INHALATION) at 20:02

## 2022-03-19 RX ADMIN — POLYETHYLENE GLYCOL 3350 17 G: 17 POWDER, FOR SOLUTION ORAL at 08:30

## 2022-03-19 RX ADMIN — GABAPENTIN 100 MG: 100 CAPSULE ORAL at 16:26

## 2022-03-19 RX ADMIN — ACETAMINOPHEN 650 MG: 325 TABLET ORAL at 04:52

## 2022-03-19 NOTE — PROGRESS NOTES
Physical Medicine and Rehabilitation Progress Note    [facility] [address]  Admit Date: 3/18/2022  Admit Diagnosis: [facility] [S06.5X9A]    Subjective:Patient seen face-to-face. No pain complaints. BM 2 days ago. Denies lightheadedness, SOB at rest or nausea. Participating in therapy.     Objective:     Current Facility-Administered Medications   Medication Dose Route Frequency    azithromycin (ZITHROMAX) tablet 500 mg  500 mg Oral Q MON, WED & FRI    budesonide-formoteroL (SYMBICORT) 160-4.5 mcg/actuation HFA inhaler 2 Puff  2 Puff Inhalation BID RT    escitalopram oxalate (LEXAPRO) tablet 10 mg  10 mg Oral DAILY    ezetimibe (ZETIA) tablet 10 mg  10 mg Oral DAILY    gabapentin (NEURONTIN) capsule 100 mg  100 mg Oral TID    LORazepam (ATIVAN) tablet 0.5 mg  0.5 mg Oral Q8H PRN    methIMAzole (TAPAZOLE) tablet 10 mg  10 mg Oral DAILY    metoprolol succinate (TOPROL-XL) XL tablet 100 mg  100 mg Oral DAILY    nitroglycerin (NITROSTAT) tablet 0.4 mg  0.4 mg SubLINGual PRN    ondansetron (ZOFRAN ODT) tablet 4 mg  4 mg Oral Q6H PRN    oxyCODONE IR (ROXICODONE) tablet 5 mg  5 mg Oral Q4H PRN    pantoprazole (PROTONIX) tablet 40 mg  40 mg Oral ACB&D    polyethylene glycol (MIRALAX) packet 17 g  17 g Oral DAILY    predniSONE (DELTASONE) tablet 15 mg  15 mg Oral DAILY WITH BREAKFAST    tiotropium bromide (SPIRIVA RESPIMAT) 2.5 mcg /actuation  2 Puff Inhalation DAILY    traZODone (DESYREL) tablet 50 mg  50 mg Oral QHS PRN    acetaminophen (TYLENOL) tablet 650 mg  650 mg Oral Q4H PRN    atorvastatin (LIPITOR) tablet 80 mg  80 mg Oral QHS    bisacodyL (DULCOLAX) tablet 5 mg  5 mg Oral DAILY PRN    senna-docusate (PERICOLACE) 8.6-50 mg per tablet 2 Tablet  2 Tablet Oral QHS    guaiFENesin ER (MUCINEX) tablet 600 mg  600 mg Oral Q12H    aspirin chewable tablet 81 mg  81 mg Oral DAILY     Allergies   Allergen Reactions    Promethazine Nausea and Vomiting and Other (comments)     Severe vomiting        Visit Vitals  /63 (BP 1 Location: Right upper arm, BP Patient Position: Sitting)   Pulse 73   Temp 98.3 °F (36.8 °C)   Resp 20   SpO2 96%      Intake and Output:  No intake/output data recorded. Physical Exam:   General:  Alert, NAD, resting comfortably in bed    HEENT:  Normocephalic, EOM intact, facial symmetry noted. Nares patent, oral mucosa moist without lesions. Lungs:  Clear to auscultation bilaterally    Heart:  Regular rate and underlying rhythm, No murmur        Abdomen:  Soft, Bowel sounds present     Neuromuscular:  Non focal prox>distal weakness x right hip flexion 4-/5, 4+ on the left  DF/PF 5/5  Sensation intact  Thought processing slightly delayed  Speech and conv appropriate      Skin:  Intact, dry, good skin turgor, age related changes present   Edema: none  Well healed right hip inc  Multiple bruises on UEs     Functional Assessment:          Balance  Sitting - Static: Good (unsupported) (03/18/22 1500)  Sitting - Dynamic: Good (unsupported) (03/18/22 1500)  Standing - Static: Fair (03/18/22 1500)       Terese Grimes Fall Risk Assessment:  Terese Grimes Fall Risk  Mobility: Ambulates or transfers with assist devices or assistance (03/19/22 7382)  Mobility Interventions: Bed/chair exit alarm;OT consult for ADLs; Patient to call before getting OOB;Utilize walker, cane, or other assistive device (03/19/22 0048)  Mentation: Alert, oriented x 3 (03/19/22 3414)  Medication: Patient receiving anticonvulsants, sedatives(tranquilizers), psychotropics or hypnotics, hypoglycemics, narcotics, sleep aids, antihypertensives, laxatives, or diuretics (03/19/22 8264)  Medication Interventions: Bed/chair exit alarm; Patient to call before getting OOB (03/19/22 4017)  Elimination: Needs assistance with toileting (03/19/22 3350)  Elimination Interventions: Call light in reach; Patient to call for help with toileting needs; Toileting schedule/hourly rounds (03/19/22 4285)  Prior Fall History: Before admission in past 12 months _home or previous inpatient care) (03/19/22 3260)  History of Falls Interventions: Bed/chair exit alarm (03/19/22 0728)  Total Score: 4 (03/19/22 0728)  Standard Fall Precautions: Yes (03/18/22 1340)  High Fall Risk: Yes (03/19/22 0728)     Ambulation:  Gait  Distance (ft): 60 Feet (ft) (03/18/22 1500)  Assistive Device: [place], rolling;Gait belt (03/18/22 1500)     Labs/Studies:  Recent Results (from the past 72 hour(s))   METABOLIC PANEL, BASIC    Collection Time: 03/16/22 12:15 PM   Result Value Ref Range    Sodium 137 136 - 145 mmol/L    Potassium 5.0 3.5 - 5.1 mmol/L    Chloride 105 98 - 107 mmol/L    CO2 27 21 - 32 mmol/L    Anion gap 5 (L) 7 - 16 mmol/L    Glucose 117 (H) 65 - 100 mg/dL    BUN 12 8 - 23 MG/DL    Creatinine 0.70 0.6 - 1.0 MG/DL    GFR est AA >60 >60 ml/min/1.73m2    GFR est non-AA >60 >60 ml/min/1.73m2    Calcium 9.3 8.3 - 10.4 MG/DL   MAGNESIUM    Collection Time: 03/16/22 12:15 PM   Result Value Ref Range    Magnesium 2.6 (H) 1.8 - 2.4 mg/dL   TSH 3RD GENERATION    Collection Time: 03/16/22 12:15 PM   Result Value Ref Range    TSH 0.017 (L) 0.358 - 3.740 uIU/mL   CBC W/O DIFF    Collection Time: 03/16/22  8:17 PM   Result Value Ref Range    WBC 5.9 4.3 - 11.1 K/uL    RBC 3.07 (L) 4.05 - 5.2 M/uL    HGB 9.4 (L) 11.7 - 15.4 g/dL    HCT 30.6 (L) 35.8 - 46.3 %    MCV 99.7 (H) 79.6 - 97.8 FL    MCH 30.6 26.1 - 32.9 PG    MCHC 30.7 (L) 31.4 - 35.0 g/dL    RDW 15.6 (H) 11.9 - 14.6 %    PLATELET 931 225 - 907 K/uL    MPV 9.9 9.4 - 12.3 FL    ABSOLUTE NRBC 0.00 0.0 - 0.2 K/uL   CBC W/O DIFF    Collection Time: 03/17/22  5:16 PM   Result Value Ref Range    WBC 7.1 4.3 - 11.1 K/uL    RBC 3.03 (L) 4.05 - 5.2 M/uL    HGB 9.3 (L) 11.7 - 15.4 g/dL    HCT 29.8 (L) 35.8 - 46.3 %    MCV 98.3 (H) 79.6 - 97.8 FL    MCH 30.7 26.1 - 32.9 PG    MCHC 31.2 (L) 31.4 - 35.0 g/dL    RDW 15.4 (H) 11.9 - 14.6 %    PLATELET 477 630 - 206 K/uL    MPV 9.8 9.4 - 12.3 FL    ABSOLUTE NRBC 0.00 0.0 - 0.2 K/uL   METABOLIC PANEL, BASIC    Collection Time: 03/18/22  5:00 AM   Result Value Ref Range    Sodium 138 136 - 145 mmol/L    Potassium 4.1 3.5 - 5.1 mmol/L    Chloride 105 98 - 107 mmol/L    CO2 29 21 - 32 mmol/L    Anion gap 4 (L) 7 - 16 mmol/L    Glucose 123 (H) 65 - 100 mg/dL    BUN 11 8 - 23 MG/DL    Creatinine 0.60 0.6 - 1.0 MG/DL    GFR est AA >60 >60 ml/min/1.73m2    GFR est non-AA >60 >60 ml/min/1.73m2    Calcium 9.3 8.3 - 10.4 MG/DL   COVID-19 RAPID TEST    Collection Time: 03/18/22 10:37 AM   Result Value Ref Range    Specimen source NASAL      COVID-19 rapid test Not detected NOTD       Assessment: This is a R handed 65 YO well known to [facility] s/p hip fx last month. Briefly, she has a PMH of severe O2 dependent COPD,CAD s/p PCI, chronic respiratory failure with hypoxia, HTN, now admitted after a fall sustaining a R SDH. She has ongoing mobility and self care impairments.      Rehabilitation Plan  Continue PT for a minimum of 1.5 hours a day, at least 5 out of 7 days per week to address bed mobility, transfers, ambulation, strengthening, balance, and endurance. Continue OT for a minimum of 1.5 hours a day, at least 5 out of 7 days per week to address ADLs (bathing, LE dressing, toileting) and adaptive equipment as needed. Continue ST for:higher level cognition; therapy schedule may be adjusted by MD based on patient's needs.     Continue 24-hour skilled rehabilitation nursing for bowel and bladder management, skin care for decubitus ulcer prevention, pain management and ongoing medication administration. S/p traumatic SDH    Daily physician / PA medical management:     SDH - PT/OT ; good rehab potential .      S/p Right IT fx Feb 2022 now s/p fall with fx of the lesser trochanter; WBAT. No surgery required per Dr Edd Ellis ;cont lipitor and Zetia     COPD; O2 and steroid dependent. 1-2 L O2. Cont to enc IS.  Cont sybicort, 15mg prednisone and Spiriva; there was concern when hospitalized at the end of Feb 2022 of pulmonary malignancy with a 2.8cm spiclated mass in the left perihilar lower lobe. This is per CT chest 2/26 2022. She is on prophylactic azithromycin for inflammation due to COPD/emphysema  MWF     Hypokalemia ; resolved; monitor and replace if needed     Hypertension - BP fluctuating, managed medically. Cont toprol XL  -3/19 HR and BP acceptable, 106/63     Anemia; hgb 9.3 and stable     Hx of MI s/p stent to the LAD and distal right coronary artery  Feb 7,2022; cont statin, was on Effient at home. Plans to restart ASA and begin Plavix; will wait 2 weeks post SDH. This was a very small bleed. ASA 81mg restarted on [facility] admission. Monitor clinically. Seen by Dr Stacy Gallegos 3/16.      Pain management -fairly recent right hip fx, now. Will require regular pain assessment and comprehensive pain management. Prn louis and tylenol; hx of T12-L1 compression fxs     Pneumonia prophylaxis - incentive spirometer every hour while awake.     DVT risk / DVT prophylaxis - daily physician / PA exam to assess as patient is at increased risk for of thromboembolism. Mobilize as tolerated. Sequential pneumatic compression devices (SCDs) when in bed; thigh-high or knee-high thromboembolic deterrent hose when out of bed. no anticoag due to SDH     GERD - resume PPI bid. At times may need additional antacids, Maalox prn.     Depression - continue on Lexapro. At risk of depression exacerbation due to re hospitalization and decline in functional mobility and independence. Add back prn ativan for anxiety     General skin care / wound prevention - monitor  i general skin  status daily per staff and physician / PA. At risk for failure due to impaired mobility. Pt with multiple bruises and friable skin     Bladder program; voiding without difficulty     Bowel program - at risk for constipation as a side effect of opioids, other medications, impaired mobility, etc. MiraLAX daily for regularity, Marisela-Colace for stool softener. PRN MOM, bisacodyl suppository or tablets for constipation.     Hyperthyroidism; cont Tapazole     Time spent was 25 minutes with over 1/2 in direct patient care/examination, consultation and coordination of care.     Signed By: Jack Peabody, MD     March 19, 2022

## 2022-03-19 NOTE — PROGRESS NOTES
PHYSICAL THERAPY DAILY NOTE  Time In: 1036  Time Out: 9145  Patient Seen For: AM;Gait training;Patient education; Therapeutic exercise;Transfer training    Subjective:  \"Im doing better lets do some therapy\"       Objective: pt rates 2/10 pain in RLE before and after therapy  Other (comment) (Fall precautions, WBAT RLE)  GROSS ASSESSMENT Daily Assessment            COGNITION Daily Assessment           BED/MAT MOBILITY Daily Assessment    Rolling Right : 5 (Supervision)  Rolling Left : 5 (Supervision)  Supine to Sit : 5 (Supervision)  Sit to Supine : 5 (Supervision)       TRANSFERS Daily Assessment   2x5 sit to stands from edge of mat table controlled eccentrics Transfer Type: SPT with walker  Sit to Stand Assistance: Stand-by assistance  Car Transfers: Not tested       GAIT Daily Assessment   additional 100ft using FWW reciprocal steps Amount of Assistance: 5 (Stand-by assistance)  Distance (ft): 150 Feet (ft)  Assistive Device: Walker, rolling;Gait belt       STEPS or STAIRS Daily Assessment    Steps/Stairs Ambulated (#): 0       BALANCE Daily Assessment            WHEELCHAIR MOBILITY Daily Assessment            LOWER EXTREMITY EXERCISES Daily Assessment   FWW for UE support Extremity: Both  Exercise Type #1: Standing lower extremity strengthening  Sets Performed: 1  Reps Performed: 10  Level of Assist: Stand-by assistance          Assessment: pt progressing standing exercises to build balance and coordination for improved stability RLE         Plan of Care: continue with current PANCHO Jeffers  3/19/2022

## 2022-03-19 NOTE — PROGRESS NOTES
Problem: Falls - Risk of  Goal: *Absence of Falls  Description: Document Yumiko Stoner Fall Risk and appropriate interventions in the flowsheet.   Outcome: Progressing Towards Goal  Note: Fall Risk Interventions:  Mobility Interventions: Bed/chair exit alarm,OT consult for ADLs,Patient to call before getting OOB,Utilize walker, cane, or other assistive device         Medication Interventions: Bed/chair exit alarm,Patient to call before getting OOB    Elimination Interventions: Call light in reach,Patient to call for help with toileting needs,Toileting schedule/hourly rounds    History of Falls Interventions: Bed/chair exit alarm         Problem: Patient Education: Go to Patient Education Activity  Goal: Patient/Family Education  Outcome: Progressing Towards Goal

## 2022-03-19 NOTE — PROGRESS NOTES
[facility] OCCUPATIONAL THERAPY INITIAL EVALUATION    Time In: 6230  Time Out: 0913    Precautions: Falls    Pain: Patient reported pain 8/10 at beginning of session, nurse notified, pain medication provided, patient agreeable to participate in therapy    History of Presenting Illness (per previous reports): Lorenzo Morrison is a 66 y.o. female patient at [facility] who was admitted to [address] [place] [address] on 3/18/2022 by Caity Dobson MD of Physical Medicine and Rehab service with below-mentioned medical history.     HPI: The patient is a r-hand dominant, previously modified indep 66yo female known to me from [facility] stay in Feb s/p hip fx. She has a PMH of severe O2 dependent COPD,CAD status post PCI, chronic respiratory failure with hypoxia, centrilobular emphysema, hypertension, dependent at 1 L at night, pleurisy, thyroid disease, anxiety, arthritis, GERD, fall status post right femur fracture February 2022 and right carotid stenosis status post atherectomy in 2019.  Patient had a fall and presents to the emergency department with a right scalp hematoma.  The fall was reported as mechanical hitting the right side of the head and right hip.  Patient states she fell while on oxygen and tripped over tubing. CT of the head without contrast showed a small left temporal convexity with a subdural hematoma in the right without any evidence of brain compression or midline shift.  Right hip shows repaired subacute right hip fracture and a medial displacement of the lesser trochanter. She was seen by ortho and can remain WBAT with no surgical intervention required.    Neurosurgery was consulted with no acute intervention necessary with recommendation of repeat head CTs without contrast and to hold all anticoagulant antiplatelets.  Patient denied any neuro changes at that time.  Repeat CT of the head this morning showed no change from previous CT.    Cardiology was consulted due to being on Plavix and ASA s/p recent NSTEM with complicated stenting to the LAD. They recommended cont ASA and only holding Plavix for less than 3-4d.   F/u Head CT; Unchanged thin right subdural hematoma, with no mass effect or  midline shift. Pt receiving K for hypokalemia with a K of 2. 6.   hgb 9.8 WBC 11.3, creat .9  3/18 K 4.1, creat 0.6, Na 138, hbg 9.3     Physical Medicine and Rehab service was consulted, and patient was initially evaluated by Dr. Armand Waters on 3/16/2022    Past Medical History/ Co-morbidities:   Past Medical History:   Diagnosis Date    Arrhythmia     Arthritis     CAD (coronary artery disease) 2009, 8/19/2013    PCI,  Equilla Dace     Carotid stenosis, right     endarterectomy 11/25/19    Chronic anxiety 4/27/2017    COPD     recent referral to [address] for lung transplant    Depression 5/6/2021    Emphysema lung ([facility]     GERD (gastroesophageal reflux disease)     controlled with medication    History of echocardiogram 06/07/2019    History of echocardiogram     echo 06/07/19 LVEF 55-60%    Hypertension     Nausea & vomiting     Oxygen dependent     1 liter at night    Pleurisy     Thyroid disease     pt denies       Patient's Goal: Patient reported she would like to get her endurance back    Previous Level of Function: Patient reported she had set up assistance for bathing, dressing, grooming; using a FWW for ambulating distances in home, portable o2 [address] [facility] residence   Lives Alone No   [address] (walk in; no GB)   Current DME Commode, bedside,Cane, straight,Walker, rolling,Oxygen, portable,Shower chair,Wheelchair   Lift Chair  No   Stairs to Enter 1      Rails  No      W/C Ramp No   Interior Steps  N\o     Upper Extremity Function   LUE RUE   FMC Intact Intact   GMC Intact Intact   Light Touch No apparent deficit No apparent deficit   Sharp/Dull Discrimination No apparent deficit No apparent deficit   Hot/Cold No apparent deficit No apparent deficit   Proprioception No apparent deficit No apparent deficit   Stereognosis No apparent deficit No apparent deficit   9 Hole Peg Test          CAIN LEAL   General Evalutaion       Shoulder Flexion 4+ 4+   Shoulder Extension  4+ 4+   Shoulder Abduction 4+ 4+   Shoulder Adduction 4+ 4+   Elbow Flexion 4+     Elbow Extension  4+        4+   0/5 No palpable muscle contraction  1/5 Palpable muscle contraction, no joint movement  2-/5 Less than full range of motion in gravity eliminated position  2/5 Able to complete full range of motion in gravity eliminated position  2+/5 Able to initiate movement against gravity  3-/5 More than half but not full range of motion against gravity  3/5 Able to complete full range of motion against gravity  3+/5 Completes full range of motion against gravity with minimal resistance  4-/5 Completes full range of motion against gravity with minimal-moderate resistance  4/5 Completes full range of motion against gravity with moderate resistance  4+/5 Completes full range of motion against gravity with moderate-maximum resistance  5/5 Completes full range of motion against gravity with maximum resistance      Functional Mobility   Score Comments   Rolling 4: Supervision or touching A Side: Left  supervision   Supine to Sit 4: Supervision or touching A supervision   Sit to Supine 4: Supervision or touching A supervision   Sit to Stand 4: Supervision or touching A CGA   Transfer Assist 4: Supervision or touching A Transfer Type: SPT   Equipment: Rolling Walker   Comments: CGA     Activities of Daily Living    Score Comments   Eating 6: Independent    Oral Hyigene 4: Supervision or touching A Setup assistance, CGA/SBA while standing at sink   Bathing 4: Supervision or touching A Type of Shower: Shower  Position: Supported sitting and Standing PRN   Adaptive  Equipment: Tub Transfer Bench, Grab Bars and Walker  Comments: CGA/SBA while standing   Upper Body  Dressing 5: S/U or clean-up assist Items Applied: Pullover and Bra  Position: Unsupported Sitting  Comments: set up assistance   Lower Body Dressing 3: Partial/Moderate A Items Applied: Underwear and Elastic pants  Position: Standing PRN and Unsupported Sitting  Adaptive Equipment: RW  Comments: minimal assistance adjusting pants on RLE   Donning/Thorp Footwear 3: Partial/Moderate A Items Applied: Socks  Adaptive Equipment: N/A  Comments: donned sock on RLE due to decreased ROM, patient donned sock on LLE   Toilet Transfer 4: Supervision or touching A Transfer Type: SPT   Equipment: Rolling Walker and Grab Bars   Comments: [address] 4: Supervision or touching A Output: urine  Comments: CGA while standing     Cognition: Oriented x4; 15/15 IRF MELINDA    Vision/Perception: No deficits noted. Patient does need glasses to see, reported she has a new prescription for lenses but doesn't have them yet. Session: Patient is a 66 y.o female who presented with decreased functional endurance, strength, and mobility. Patient completed ADLs listed above, requiring increased time due to limited endurance. Patient remained on 1.5 L o2 via NC throughout duration of session, typically on 1L o2 at home. Patient reported pain, but still able to participate. She will benefit from skilled OT services to facilitate improved functional strength, mobility, endurance to safely return home. Interdisciplinary Communication: discussed session with PTADemar and nurse, Toya Du    Patient/Family Education: Patient was/were educated On the role of OT, On POC and On IRC expectations.      Problem List: [facility], [address], Activity Tolerance, Decreased ROM UE/LE, Safety Awareness, Strength, Pain, Sitting Balance and Standing Balance    Functional Limitations: ADL, IADL, Functional Transfers and Functional Mobility    Goals:   LTG 1: Patient will be modified independent with lower body dressing using AE/DME PRN within 7 days. LTG 2: Patient will be modified independent with bathing using AE/DME PRN within 7 days  LTG 3: Patient will demonstrate supervision/SBA with toilet transfer using AE/DME PRN within 7 days  LTG 4: Patient will be modified independent with toilet hygiene using AE/DME PRN within 7 days  LTG 5: Patient will demonstrate SBA/Supervision with shower transfer using AE/DME PRN within 7 days      OT order received and chart reviewed. OT orders have been acknowledged. Patient will benefit from skilled OT services to address ADL, functional transfers, UE strength, UE coordination, balance, cognition and activity tolerance to maximize functional performance with daily self-care tasks and functional mobility. Patient will be seen for 1.5-2 hours of skilled OT services 5-6 days a week as appropriate. Initate POC.      Donney Mcburney, DENNY, OTR/L  3/19/2022

## 2022-03-20 PROBLEM — I25.9 ISCHEMIC HEART DISEASE: Status: ACTIVE | Noted: 2021-02-24

## 2022-03-20 PROBLEM — I34.0 MODERATE TO SEVERE MITRAL REGURGITATION: Status: ACTIVE | Noted: 2022-01-01

## 2022-03-20 PROBLEM — I16.0 HYPERTENSIVE URGENCY: Status: ACTIVE | Noted: 2019-06-08

## 2022-03-20 LAB
ATRIAL RATE: 82 BPM
CALCULATED P AXIS, ECG09: 88 DEGREES
CALCULATED R AXIS, ECG10: 60 DEGREES
CALCULATED T AXIS, ECG11: 110 DEGREES
DIAGNOSIS, 93000: NORMAL
P-R INTERVAL, ECG05: 152 MS
Q-T INTERVAL, ECG07: 394 MS
QRS DURATION, ECG06: 70 MS
QTC CALCULATION (BEZET), ECG08: 460 MS
TROPONIN-HIGH SENSITIVITY: 31.1 PG/ML (ref 0–14)
VENTRICULAR RATE, ECG03: 82 BPM

## 2022-03-20 PROCEDURE — 3331090001 HH PPS REVENUE CREDIT

## 2022-03-20 PROCEDURE — 74011250637 HC RX REV CODE- 250/637: Performed by: PHYSICAL MEDICINE & REHABILITATION

## 2022-03-20 PROCEDURE — 74011636637 HC RX REV CODE- 636/637: Performed by: PHYSICAL MEDICINE & REHABILITATION

## 2022-03-20 PROCEDURE — 94640 AIRWAY INHALATION TREATMENT: CPT

## 2022-03-20 PROCEDURE — 77010033678 HC OXYGEN DAILY

## 2022-03-20 PROCEDURE — 99231 SBSQ HOSP IP/OBS SF/LOW 25: CPT | Performed by: PHYSICAL MEDICINE & REHABILITATION

## 2022-03-20 PROCEDURE — 3331090002 HH PPS REVENUE DEBIT

## 2022-03-20 PROCEDURE — 84484 ASSAY OF TROPONIN QUANT: CPT

## 2022-03-20 PROCEDURE — 93005 ELECTROCARDIOGRAM TRACING: CPT | Performed by: PHYSICAL MEDICINE & REHABILITATION

## 2022-03-20 PROCEDURE — 36415 COLL VENOUS BLD VENIPUNCTURE: CPT

## 2022-03-20 PROCEDURE — 94760 N-INVAS EAR/PLS OXIMETRY 1: CPT

## 2022-03-20 PROCEDURE — 65310000000 HC RM PRIVATE REHAB

## 2022-03-20 RX ADMIN — PANTOPRAZOLE SODIUM 40 MG: 40 TABLET, DELAYED RELEASE ORAL at 06:31

## 2022-03-20 RX ADMIN — GUAIFENESIN 600 MG: 600 TABLET ORAL at 08:14

## 2022-03-20 RX ADMIN — BUDESONIDE AND FORMOTEROL FUMARATE DIHYDRATE 2 PUFF: 160; 4.5 AEROSOL RESPIRATORY (INHALATION) at 19:38

## 2022-03-20 RX ADMIN — LORAZEPAM 0.5 MG: 0.5 TABLET ORAL at 08:42

## 2022-03-20 RX ADMIN — METOPROLOL SUCCINATE 100 MG: 50 TABLET, EXTENDED RELEASE ORAL at 08:14

## 2022-03-20 RX ADMIN — OXYCODONE HYDROCHLORIDE 5 MG: 5 TABLET ORAL at 08:43

## 2022-03-20 RX ADMIN — PREDNISONE 15 MG: 10 TABLET ORAL at 08:14

## 2022-03-20 RX ADMIN — NITROGLYCERIN 0.4 MG: 0.4 TABLET SUBLINGUAL at 09:05

## 2022-03-20 RX ADMIN — GABAPENTIN 100 MG: 100 CAPSULE ORAL at 16:49

## 2022-03-20 RX ADMIN — EZETIMIBE 10 MG: 10 TABLET ORAL at 08:14

## 2022-03-20 RX ADMIN — OXYCODONE HYDROCHLORIDE 5 MG: 5 TABLET ORAL at 20:13

## 2022-03-20 RX ADMIN — NITROGLYCERIN 0.4 MG: 0.4 TABLET SUBLINGUAL at 16:39

## 2022-03-20 RX ADMIN — GUAIFENESIN 600 MG: 600 TABLET ORAL at 20:13

## 2022-03-20 RX ADMIN — GABAPENTIN 100 MG: 100 CAPSULE ORAL at 20:13

## 2022-03-20 RX ADMIN — LORAZEPAM 0.5 MG: 0.5 TABLET ORAL at 16:50

## 2022-03-20 RX ADMIN — METHIMAZOLE 10 MG: 5 TABLET ORAL at 08:14

## 2022-03-20 RX ADMIN — ACETAMINOPHEN 650 MG: 325 TABLET ORAL at 19:22

## 2022-03-20 RX ADMIN — ATORVASTATIN CALCIUM 80 MG: 80 TABLET, FILM COATED ORAL at 20:13

## 2022-03-20 RX ADMIN — BUDESONIDE AND FORMOTEROL FUMARATE DIHYDRATE 2 PUFF: 160; 4.5 AEROSOL RESPIRATORY (INHALATION) at 07:28

## 2022-03-20 RX ADMIN — PANTOPRAZOLE SODIUM 40 MG: 40 TABLET, DELAYED RELEASE ORAL at 16:49

## 2022-03-20 RX ADMIN — ASPIRIN 81 MG: 81 TABLET, CHEWABLE ORAL at 08:14

## 2022-03-20 RX ADMIN — ESCITALOPRAM OXALATE 10 MG: 10 TABLET ORAL at 08:14

## 2022-03-20 RX ADMIN — TIOTROPIUM BROMIDE INHALATION SPRAY 2 PUFF: 3.12 SPRAY, METERED RESPIRATORY (INHALATION) at 07:28

## 2022-03-20 RX ADMIN — GABAPENTIN 100 MG: 100 CAPSULE ORAL at 08:14

## 2022-03-20 NOTE — PROGRESS NOTES
Patient taken to the bathroom and after returning to the recliner complaining of chest pain. Vitals taken and nitro given at 1639. Voice mail left on Dr. Faiza Andujar cell phone. Chest pain resolved within 5 minutes and vitals taken.

## 2022-03-20 NOTE — PROGRESS NOTES
Problem: Falls - Risk of  Goal: *Absence of Falls  Description: Document Akua Baker Fall Risk and appropriate interventions in the flowsheet.   Outcome: Progressing Towards Goal  Note: Fall Risk Interventions:  Mobility Interventions: Bed/chair exit alarm,Patient to call before getting OOB,Utilize walker, cane, or other assistive device         Medication Interventions: Bed/chair exit alarm,Patient to call before getting OOB    Elimination Interventions: Call light in reach,Patient to call for help with toileting needs,Toileting schedule/hourly rounds    History of Falls Interventions: Bed/chair exit alarm,Door open when patient unattended         Problem: Patient Education: Go to Patient Education Activity  Goal: Patient/Family Education  Outcome: Progressing Towards Goal

## 2022-03-20 NOTE — PROGRESS NOTES
Problem: Falls - Risk of  Goal: *Absence of Falls  Description: Document Tyrone Rudd Fall Risk and appropriate interventions in the flowsheet.   Outcome: Progressing Towards Goal  Note: Fall Risk Interventions:  Mobility Interventions: Utilize walker, cane, or other assistive device         Medication Interventions: Bed/chair exit alarm    Elimination Interventions: Call light in reach    History of Falls Interventions: Door open when patient unattended         Problem: Patient Education: Go to Patient Education Activity  Goal: Patient/Family Education  Outcome: Progressing Towards Goal

## 2022-03-20 NOTE — PROGRESS NOTES
6[facility] -  PT NOTE:  Will need new orders to resume therapy services following the surgery today (laparoscopic cholecystectomy)   Physical Medicine and Rehabilitation Progress Note    Alcon Hickey [address]  Admit Date: 3/18/2022  Admit Diagnosis: SDH (subdural hemat[facility] ([facility] [S06.5X9A]    Subjective:Patient seen face-to-face. Generalized anterior chest pain, relieved with SL NTG. Denies lightheadedness, SOB at rest or nausea. Participating in therapy.     Objective:     Current Facility-Administered Medications   Medication Dose Route Frequency    azithromycin (ZITHROMAX) tablet 500 mg  500 mg Oral Q MON, WED & FRI    budesonide-formoteroL (SYMBICORT) 160-4.5 mcg/actuation HFA inhaler 2 Puff  2 Puff Inhalation BID RT    escitalopram oxalate (LEXAPRO) tablet 10 mg  10 mg Oral DAILY    ezetimibe (ZETIA) tablet 10 mg  10 mg Oral DAILY    gabapentin (NEURONTIN) capsule 100 mg  100 mg Oral TID    LORazepam (ATIVAN) tablet 0.5 mg  0.5 mg Oral Q8H PRN    methIMAzole (TAPAZOLE) tablet 10 mg  10 mg Oral DAILY    metoprolol succinate (TOPROL-XL) XL tablet 100 mg  100 mg Oral DAILY    nitroglycerin (NITROSTAT) tablet 0.4 mg  0.4 mg SubLINGual PRN    ondansetron (ZOFRAN ODT) tablet 4 mg  4 mg Oral Q6H PRN    oxyCODONE IR (ROXICODONE) tablet 5 mg  5 mg Oral Q4H PRN    pantoprazole (PROTONIX) tablet 40 mg  40 mg Oral ACB&D    polyethylene glycol (MIRALAX) packet 17 g  17 g Oral DAILY    predniSONE (DELTASONE) tablet 15 mg  15 mg Oral DAILY WITH BREAKFAST    tiotropium bromide (SPIRIVA RESPIMAT) 2.5 mcg /actuation  2 Puff Inhalation DAILY    traZODone (DESYREL) tablet 50 mg  50 mg Oral QHS PRN    acetaminophen (TYLENOL) tablet 650 mg  650 mg Oral Q4H PRN    atorvastatin (LIPITOR) tablet 80 mg  80 mg Oral QHS    bisacodyL (DULCOLAX) tablet 5 mg  5 mg Oral DAILY PRN    senna-docusate (PERICOLACE) 8.6-50 mg per tablet 2 Tablet  2 Tablet Oral QHS    guaiFENesin ER (MUCINEX) tablet 600 mg  600 mg Oral Q12H    aspirin chewable tablet 81 mg  81 mg Oral DAILY     Allergies   Allergen Reactions    Promethazine Nausea and Vomiting and Other (comments)     Severe vomiting        Visit Vitals  BP (!) 154/74   Pulse 72   Temp 97.5 °F (36.4 °C)   Resp 18   SpO2 100%      Intake and Output:  No intake/output data recorded. Physical Exam:   General:  Alert, NAD, resting comfortably in bed    HEENT:  Normocephalic, EOM intact, facial symmetry noted. Nares patent, oral mucosa moist without lesions. Lungs:  Clear to auscultation bilaterally    Heart:  Regular rate and underlying rhythm, No murmur        Abdomen:  Soft, Bowel sounds present     Neuromuscular:  Non focal prox>distal weakness x right hip flexion 4-/5, 4+ on the left  DF/PF 5/5  Sensation intact  Thought processing slightly delayed  Speech and conv appropriate      Skin:  Intact, dry, good skin turgor, age related changes present   Edema: none  Well healed right hip inc  Multiple bruises on UEs     Functional Assessment:          Balance  Sitting - Static: Good (unsupported) (03/18/22 1500)  Sitting - Dynamic: Good (unsupported) (03/18/22 1500)  Standing - Static: Fair (03/18/22 1500)       [place] Fall Risk Assessment:  [place] Fall Risk  Mobility: Ambulates or transfers with assist devices or assistance (03/20/22 0708)  Mobility Interventions: Bed/chair exit alarm; Patient to call before getting OOB;Utilize walker, cane, or other assistive device (03/20/22 0708)  Mentation: Alert, oriented x 3 (03/20/22 0708)  Medication: Patient receiving anticonvulsants, sedatives(tranquilizers), psychotropics or hypnotics, hypoglycemics, narcotics, sleep aids, antihypertensives, laxatives, or diuretics (03/20/22 0708)  Medication Interventions: Bed/chair exit alarm; Patient to call before getting OOB (03/20/22 7282)  Elimination: Needs assistance with toileting (03/20/22 0708)  Elimination Interventions: Call light in reach; Patient to call for help with toileting needs; Toileting schedule/hourly rounds (03/20/22 0708)  Prior Fall History: Before admission in past 12 months _home or previous inpatient care) (03/20/22 0708)  History of Falls Interventions: Bed/chair exit alarm; Door open when patient unattended (03/20/22 0708)  Total Score: 4 (03/20/22 0708)  Standard Fall Precautions: Yes (03/18/22 1340)  High Fall Risk: Yes (03/20/22 0708)     Ambulation:  Gait  Distance (ft): 150 Feet (ft) (03/19/22 1200)  Assistive Device: Bagley Grapes, rolling;Gait belt (03/19/22 1200)     Labs/Studies:  Recent Results (from the past 72 hour(s))   CBC W/O DIFF    Collection Time: 03/17/22  5:16 PM   Result Value Ref Range    WBC 7.1 4.3 - 11.1 K/uL    RBC 3.03 (L) 4.05 - 5.2 M/uL    HGB 9.3 (L) 11.7 - 15.4 g/dL    HCT 29.8 (L) 35.8 - 46.3 %    MCV 98.3 (H) 79.6 - 97.8 FL    MCH 30.7 26.1 - 32.9 PG    MCHC 31.2 (L) 31.4 - 35.0 g/dL    RDW 15.4 (H) 11.9 - 14.6 %    PLATELET 572 808 - 670 K/uL    MPV 9.8 9.4 - 12.3 FL    ABSOLUTE NRBC 0.00 0.0 - 0.2 K/uL   METABOLIC PANEL, BASIC    Collection Time: 03/18/22  5:00 AM   Result Value Ref Range    Sodium 138 136 - 145 mmol/L    Potassium 4.1 3.5 - 5.1 mmol/L    Chloride 105 98 - 107 mmol/L    CO2 29 21 - 32 mmol/L    Anion gap 4 (L) 7 - 16 mmol/L    Glucose 123 (H) 65 - 100 mg/dL    BUN 11 8 - 23 MG/DL    Creatinine 0.60 0.6 - 1.0 MG/DL    GFR est AA >60 >60 ml/min/1.73m2    GFR est non-AA >60 >60 ml/min/1.73m2    Calcium 9.3 8.3 - 10.4 MG/DL   COVID-19 RAPID TEST    Collection Time: 03/18/22 10:37 AM   Result Value Ref Range    Specimen source NASAL      COVID-19 rapid test Not detected NOTD       Assessment: This is a R handed 65 YO well known to [facility] s/p hip fx last month. Briefly, she has a PMH of severe O2 dependent COPD,CAD s/p PCI, chronic respiratory failure with hypoxia, HTN, now admitted after a fall sustaining a R SDH. She has ongoing mobility and self care impairments.      Rehabilitation Plan  Continue PT for a minimum of 1.5 hours a day, at least 5 out of 7 days per week to address bed mobility, transfers, ambulation, strengthening, balance, and endurance.    Continue OT for a minimum of 1.5 hours a day, at least 5 out of 7 days per week to address ADLs (bathing, LE dressing, toileting) and adaptive equipment as needed. Continue ST for:higher level cognition; therapy schedule may be adjusted by MD based on patient's needs.     Continue 24-hour skilled rehabilitation nursing for bowel and bladder management, skin care for decubitus ulcer prevention, pain management and ongoing medication administration. S/p traumatic SDH    Daily physician / PA medical management:     SDH - PT/OT ; good rehab potential .      S/p Right IT fx Feb 2022 now s/p fall with fx of the lesser trochanter; WBAT. No surgery required per Dr Ezio Prabhakar ;cont lipitor and Zetia     COPD; O2 and steroid dependent. 1-2 L O2. Cont to enc IS. Cont sybicort, 15mg prednisone and Spiriva; there was concern when hospitalized at the end of Feb 2022 of pulmonary malignancy with a 2.8cm spiclated mass in the left perihilar lower lobe. This is per CT chest 2/26 2022. She is on prophylactic azithromycin for inflammation due to COPD/emphysema  MWF     Hypokalemia ; resolved; monitor and replace if needed     Hypertension - BP fluctuating, managed medically. Cont toprol XL, prn SL NTG  -3/20 HR - 72, BP - 154/74,      Anemia; hgb 9.3 and stable     Hx of MI s/p stent to the LAD and distal right coronary artery  Feb 7,2022; cont statin, was on Effient at home. Plans to restart ASA and begin Plavix; will wait 2 weeks post SDH. This was a very small bleed. ASA 81mg restarted on [facility] admission. Monitor clinically. Seen by Dr Galdino Castaneda 3/16.      Pain management -fairly recent right hip fx, now. Will require regular pain assessment and comprehensive pain management. Prn louis and tylenol; hx of T12-L1 compression fxs     Pneumonia prophylaxis - incentive spirometer every hour while awake.     DVT risk / DVT prophylaxis - daily physician / PA exam to assess as patient is at increased risk for of thromboembolism. Mobilize as tolerated. Sequential pneumatic compression devices (SCDs) when in bed; thigh-high or knee-high thromboembolic deterrent hose when out of bed. no anticoag due to SDH     GERD - resume PPI bid. At times may need additional antacids, Maalox prn.     Depression - continue on Lexapro. At risk of depression exacerbation due to re hospitalization and decline in functional mobility and independence. Add back prn ativan for anxiety     General skin care / wound prevention - monitor  i general skin  status daily per staff and physician / PA. At risk for failure due to impaired mobility. Pt with multiple bruises and friable skin     Bladder program; voiding without difficulty     Bowel program - at risk for constipation as a side effect of opioids, other medications, impaired mobility, etc. MiraLAX daily for regularity, Marisela-Colace for stool softener. PRN MOM, bisacodyl suppository or tablets for constipation.     Hyperthyroidism; cont Tapazole     Time spent was 25 minutes with over 1/2 in direct patient care/examination, consultation and coordination of care.     Signed By: Sudhakar Frey MD     March 20, 2022

## 2022-03-21 LAB
ALBUMIN SERPL-MCNC: 2.6 G/DL (ref 3.2–4.6)
ALBUMIN/GLOB SERPL: 0.8 {RATIO} (ref 1.2–3.5)
ALP SERPL-CCNC: 81 U/L (ref 50–136)
ALT SERPL-CCNC: 17 U/L (ref 12–65)
ANION GAP SERPL CALC-SCNC: 6 MMOL/L (ref 7–16)
AST SERPL-CCNC: 11 U/L (ref 15–37)
BILIRUB SERPL-MCNC: 0.4 MG/DL (ref 0.2–1.1)
BNP SERPL-MCNC: 4033 PG/ML (ref 5–125)
BUN SERPL-MCNC: 13 MG/DL (ref 8–23)
CALCIUM SERPL-MCNC: 8.9 MG/DL (ref 8.3–10.4)
CHLORIDE SERPL-SCNC: 105 MMOL/L (ref 98–107)
CO2 SERPL-SCNC: 29 MMOL/L (ref 21–32)
CREAT SERPL-MCNC: 0.7 MG/DL (ref 0.6–1)
GLOBULIN SER CALC-MCNC: 3.1 G/DL (ref 2.3–3.5)
GLUCOSE SERPL-MCNC: 121 MG/DL (ref 65–100)
HCT VFR BLD AUTO: 27 % (ref 35.8–46.3)
HGB BLD-MCNC: 8.3 G/DL (ref 11.7–15.4)
POTASSIUM SERPL-SCNC: 4.1 MMOL/L (ref 3.5–5.1)
PROT SERPL-MCNC: 5.7 G/DL (ref 6.3–8.2)
SODIUM SERPL-SCNC: 140 MMOL/L (ref 136–145)
TROPONIN-HIGH SENSITIVITY: 32.6 PG/ML (ref 0–14)

## 2022-03-21 PROCEDURE — 97535 SELF CARE MNGMENT TRAINING: CPT

## 2022-03-21 PROCEDURE — 97530 THERAPEUTIC ACTIVITIES: CPT

## 2022-03-21 PROCEDURE — 97110 THERAPEUTIC EXERCISES: CPT

## 2022-03-21 PROCEDURE — 97150 GROUP THERAPEUTIC PROCEDURES: CPT

## 2022-03-21 PROCEDURE — 84484 ASSAY OF TROPONIN QUANT: CPT

## 2022-03-21 PROCEDURE — 99233 SBSQ HOSP IP/OBS HIGH 50: CPT | Performed by: PHYSICIAN ASSISTANT

## 2022-03-21 PROCEDURE — 74011250637 HC RX REV CODE- 250/637: Performed by: PHYSICAL MEDICINE & REHABILITATION

## 2022-03-21 PROCEDURE — 3331090001 HH PPS REVENUE CREDIT

## 2022-03-21 PROCEDURE — 97116 GAIT TRAINING THERAPY: CPT

## 2022-03-21 PROCEDURE — 94760 N-INVAS EAR/PLS OXIMETRY 1: CPT

## 2022-03-21 PROCEDURE — 65310000000 HC RM PRIVATE REHAB

## 2022-03-21 PROCEDURE — 94664 DEMO&/EVAL PT USE INHALER: CPT

## 2022-03-21 PROCEDURE — 85018 HEMOGLOBIN: CPT

## 2022-03-21 PROCEDURE — 2709999900 HC NON-CHARGEABLE SUPPLY

## 2022-03-21 PROCEDURE — 74011636637 HC RX REV CODE- 636/637: Performed by: PHYSICAL MEDICINE & REHABILITATION

## 2022-03-21 PROCEDURE — 36415 COLL VENOUS BLD VENIPUNCTURE: CPT

## 2022-03-21 PROCEDURE — 94640 AIRWAY INHALATION TREATMENT: CPT

## 2022-03-21 PROCEDURE — 83880 ASSAY OF NATRIURETIC PEPTIDE: CPT

## 2022-03-21 PROCEDURE — 77010033678 HC OXYGEN DAILY

## 2022-03-21 PROCEDURE — 3331090002 HH PPS REVENUE DEBIT

## 2022-03-21 PROCEDURE — 80053 COMPREHEN METABOLIC PANEL: CPT

## 2022-03-21 RX ORDER — CLOPIDOGREL BISULFATE 75 MG/1
75 TABLET ORAL DAILY
Status: DISCONTINUED | OUTPATIENT
Start: 2022-03-22 | End: 2022-03-31 | Stop reason: HOSPADM

## 2022-03-21 RX ADMIN — EZETIMIBE 10 MG: 10 TABLET ORAL at 08:51

## 2022-03-21 RX ADMIN — METOPROLOL SUCCINATE 100 MG: 50 TABLET, EXTENDED RELEASE ORAL at 08:51

## 2022-03-21 RX ADMIN — PANTOPRAZOLE SODIUM 40 MG: 40 TABLET, DELAYED RELEASE ORAL at 16:03

## 2022-03-21 RX ADMIN — BUDESONIDE AND FORMOTEROL FUMARATE DIHYDRATE 2 PUFF: 160; 4.5 AEROSOL RESPIRATORY (INHALATION) at 20:14

## 2022-03-21 RX ADMIN — TIOTROPIUM BROMIDE INHALATION SPRAY 2 PUFF: 3.12 SPRAY, METERED RESPIRATORY (INHALATION) at 15:29

## 2022-03-21 RX ADMIN — OXYCODONE HYDROCHLORIDE 5 MG: 5 TABLET ORAL at 05:21

## 2022-03-21 RX ADMIN — ASPIRIN 81 MG: 81 TABLET, CHEWABLE ORAL at 08:51

## 2022-03-21 RX ADMIN — GABAPENTIN 100 MG: 100 CAPSULE ORAL at 21:15

## 2022-03-21 RX ADMIN — LORAZEPAM 0.5 MG: 0.5 TABLET ORAL at 16:07

## 2022-03-21 RX ADMIN — ESCITALOPRAM OXALATE 10 MG: 10 TABLET ORAL at 08:51

## 2022-03-21 RX ADMIN — BUDESONIDE AND FORMOTEROL FUMARATE DIHYDRATE 2 PUFF: 160; 4.5 AEROSOL RESPIRATORY (INHALATION) at 08:00

## 2022-03-21 RX ADMIN — ACETAMINOPHEN 650 MG: 325 TABLET ORAL at 13:03

## 2022-03-21 RX ADMIN — GUAIFENESIN 600 MG: 600 TABLET ORAL at 08:51

## 2022-03-21 RX ADMIN — PANTOPRAZOLE SODIUM 40 MG: 40 TABLET, DELAYED RELEASE ORAL at 05:23

## 2022-03-21 RX ADMIN — OXYCODONE HYDROCHLORIDE 5 MG: 5 TABLET ORAL at 21:16

## 2022-03-21 RX ADMIN — AZITHROMYCIN MONOHYDRATE 500 MG: 250 TABLET ORAL at 21:16

## 2022-03-21 RX ADMIN — NITROGLYCERIN 0.4 MG: 0.4 TABLET SUBLINGUAL at 19:36

## 2022-03-21 RX ADMIN — PREDNISONE 15 MG: 10 TABLET ORAL at 07:24

## 2022-03-21 RX ADMIN — GABAPENTIN 100 MG: 100 CAPSULE ORAL at 08:51

## 2022-03-21 RX ADMIN — ATORVASTATIN CALCIUM 80 MG: 80 TABLET, FILM COATED ORAL at 21:16

## 2022-03-21 RX ADMIN — METHIMAZOLE 10 MG: 5 TABLET ORAL at 08:51

## 2022-03-21 RX ADMIN — ACETAMINOPHEN 650 MG: 325 TABLET ORAL at 07:24

## 2022-03-21 RX ADMIN — GABAPENTIN 100 MG: 100 CAPSULE ORAL at 16:03

## 2022-03-21 RX ADMIN — GUAIFENESIN 600 MG: 600 TABLET ORAL at 21:16

## 2022-03-21 RX ADMIN — OXYCODONE HYDROCHLORIDE 5 MG: 5 TABLET ORAL at 13:03

## 2022-03-21 NOTE — PROGRESS NOTES
Problem: Falls - Risk of  Goal: *Absence of Falls  Description: Document Morene Hole Fall Risk and appropriate interventions in the flowsheet.   Outcome: Progressing Towards Goal  Note: Fall Risk Interventions:  Mobility Interventions: Bed/chair exit alarm,Patient to call before getting OOB,Utilize walker, cane, or other assistive device         Medication Interventions: Bed/chair exit alarm,Patient to call before getting OOB    Elimination Interventions: Call light in reach,Patient to call for help with toileting needs,Toileting schedule/hourly rounds    History of Falls Interventions: Bed/chair exit alarm,Door open when patient unattended         Problem: Patient Education: Go to Patient Education Activity  Goal: Patient/Family Education  Outcome: Progressing Towards Goal

## 2022-03-21 NOTE — PROGRESS NOTES
OT Daily Note  Time In 1308   Time Out 1356     Subjective: \"I feel better now than I did when I started. \" Pt agreeable to treatment. Pain: Pt tolerated head and hip pain well throughout session  Interdisciplinary Communication: Collaborated with PT to ensure progress towards POC and goals. Precautions: Falls, WBAT RLE     Patient Vitals for the past 8 hrs:   Temp Pulse Resp BP SpO2   03/21/22 1202  76 20 (!) 131/50 96 %   03/21/22 1036    138/68    03/21/22 1035    134/75    03/21/22 0904     95 %   03/21/22 0733  79  (!) 168/76    03/21/22 0722    (!) 168/76    03/21/22 0720  79  (!) 184/83 92 %   03/21/22 0710 97.8 °F (36.6 °C) 74 18 (!) 112/53 99 %         Functional Mobility    Score Comments   Sit to Stand 4: Supervision or touching A SBA   Transfer Assist 4: Supervision or touching A Transfer Type: SPT   Equipment: Rolling Walker   Comments: SBA, cues to manage O2 NC         Activity Tolerance and Strengthening   Pt completed 10 minutes on the ergometer, 1/2 forwards and 1/2 backwards, with mild resistance to increase UB strength and activity tolerance for integration into functional tasks. Pt cued for rest breaks and self-monitoring SpO2. Pt required frequent rest breaks throughout. Activity Tolerance, Strengthening and Balance   Pt worked on a large, 50 piece jigsaw puzzle to address overall endurance, problem solving, standing balance, and standing tolerance. Pt practiced reaching outside of base of support to gather pieces with SBA and one to two UE support on table. Min cues for posture throughout. Education   benefits of OT and safety awareness     Assessment: Pt demonstrated good participation in OT treatment. Pt continues to benefit from skilled OT services to address remaining deficits and return back to baseline with improved independence and safety during I/ADLs. Patient ended session seated in wheelchair with PT. Plan: Continue OT POC.      ALIA Chaparro/MARIUSZ 3/21/2022

## 2022-03-21 NOTE — PROGRESS NOTES
OT Daily Note  Time In 1033   Time Out 1115     Subjective: \"I'm dizzy. \" Pt agreeable to treatment. Pain: Pt reported no pain  Interdisciplinary Communication: Collaborated with PT to ensure progress towards POC and goals. Precautions: Falls, WBAT RLE     Patient Vitals for the past 8 hrs:   Temp Pulse Resp BP SpO2   03/21/22 1202  76 20 (!) 131/50 96 %   03/21/22 1036    138/68    03/21/22 1035    134/75    03/21/22 0904     95 %   03/21/22 0733  79  (!) 168/76    03/21/22 0722    (!) 168/76    03/21/22 0720  79  (!) 184/83 92 %   03/21/22 0710 97.8 °F (36.6 °C) 74 18 (!) 112/53 99 %   03/21/22 0537 97.6 °F (36.4 °C) 72 18 130/61 95 %   03/21/22 0534 97.6 °F (36.4 °C) 74 18 (!) 155/74 96 %      Functional Mobility    Score Comments   Sit to Stand 4: Supervision or touching A SBA   Transfer Assist 4: Supervision or touching A Transfer Type: SPT   Equipment: Rolling Walker    Ambulation 4: Supervision or touching A Equipment: Rolling Walker   Cues for O2 NC management/safety         Activity Tolerance and Balance   Pt participated in game of ThinkLink in standing to address standing tolerance, standing balance, posture, and overall activity tolerance. Pt required cueing throughout for posture. Pt able to stand for 23 minutes 43 seconds, required no seated rest breaks throughout. Education   benefits of OT and safety awareness     Assessment: Pt demonstrated good participation in OT treatment. Pt continues to benefit from skilled OT services to address remaining deficits and return back to baseline with improved independence and safety during I/ADLs. Patient ended session seated in wheelchair with PT. Plan: Continue OT POC.      DONALD Whatley   3/21/2022

## 2022-03-21 NOTE — PROGRESS NOTES
PHYSICAL THERAPY DAILY NOTE  Time In: 2794  Time Out: 9686  Patient Seen For: AM;Gait training;Patient education; Therapeutic exercise;Transfer training; Other (see progress notes)    Subjective: Patient reporting she feels better now. Reports earlier this AM she was having chest pain and a headache. Reports she has a 50 ft 02 line at home         Objective:Vital Signs:02 at 1 lpm, 02 sat 91 to 100% during treatment  Patient Vitals for the past 12 hrs:   Temp Pulse Resp BP SpO2   03/21/22 0904     95 %   03/21/22 0733  79  (!) 168/76    03/21/22 0722    (!) 168/76    03/21/22 0720  79  (!) 184/83 92 %   03/21/22 0710 97.8 °F (36.6 °C) 74 18 (!) 112/53 99 %   03/21/22 0537 97.6 °F (36.4 °C) 72 18 130/61 95 %   03/21/22 0534 97.6 °F (36.4 °C) 74 18 (!) 155/74 96 %     Pain level:No c/o pain during treatment  Pain location:NA  Pain interventions:NA    Patient education:,transfer training, gait training, balance training,fall precautions, body mechanics,activity pacing, energy conservation, Patient verbalizing understanding and demonstrating  understanding of patient education. Recommend follow up education.     Interdisciplinary Communication:spoke with 02 regarding functional status    Other (comment) (Fall precautions, WBAT RLE)  GROSS ASSESSMENT Daily Assessment     NA       COGNITION Daily Assessment    Alert, able to follow commands,cooperating,participating, motivated,          BED/MAT MOBILITY Daily Assessment    Rolling Right : 0 (Not tested)  Rolling Left : 0 (Not tested)  Supine to Sit : 0 (Not tested)  Sit to Supine : 0 (Not tested)       TRANSFERS Daily Assessment   Increased time and effort to complete with cues for body mechanics   Transfer Type: SPT with walker  Transfer Assistance : 5 (Stand-by assistance)  Sit to Stand Assistance: Stand-by assistance  Car Transfers: Not tested       GAIT Daily Assessment    Amount of Assistance: 5 (Stand-by assistance)  Distance (ft): 80 Feet (ft) (80ft x 2 with patient managing 02 line) making one 180 degree turn while managing 02 line  Assistive Device: Walker, rolling   Gait training with one time cue for managing 02 line and controlling gait speed with RR.    STEPS or STAIRS Daily Assessment    Steps/Stairs Ambulated (#): 0  Level of Assist : 0 (Not tested)       BALANCE Daily Assessment   No loss of balance during transfer or gait training Sitting - Static: Good (unsupported)  Sitting - Dynamic: Good (unsupported)  Standing - Static: Fair  Standing - Dynamic : Impaired       WHEELCHAIR MOBILITY Daily Assessment    Curbs/Ramps Assist Required (FIM Score): 0 (Not tested)  Wheelchair Setup Assist Required : 0 (Not tested)       LOWER EXTREMITY EXERCISES Daily Assessment   Increased time and effort to complete with multiple and frequent rest breaks. Cues for correct form   Extremity: Both  Exercise Type #1: Seated lower extremity strengthening  Sets Performed: 2  Reps Performed: 10  Level of Assist: Minimal assistance     SEATED EXERCISES Sets Reps Comments   Ankle Pumps 2 10    Hip Flexion 2 10    Long Arc Quads 2 10    Hip Adduction/Ball Squeeze 2 10    Hip Abduction with red Theraband 2 10    Hamstring Curls with red Theraband 2 10             Assessment: Progressing towards goals. 02 sat stable n 02 at 1 lpm. Demonstrating understanding of how to manage 02 line during gait with RW. Patient returned to room at end of treatment and remained up in recliner with LEs elevated and with needs in reach. 02 at 1 lpm    Plan of Care: Continue with POC and progress as tolerated.      Alicia Christianson, PT  3/21/2022

## 2022-03-21 NOTE — PROGRESS NOTES
OT Daily Note    Time In 0706   Time Out 0749     Subjective: \"My head hurts. I want to put my head through that wall. Should you tell the neurologist?\" Pt agreeable to treatment. Pain: Pt reported 8/10 pain  Interdisciplinary Communication: Communicated with RN to ensure progress towards POC and goals. Precautions: Falls, WBAT RLE     Patient Vitals for the past 8 hrs:   Temp Pulse Resp BP SpO2   03/21/22 0904     95 %   03/21/22 0733  79  (!) 168/76    03/21/22 0722    (!) 168/76    03/21/22 0720  79  (!) 184/83 92 %   03/21/22 0710 97.8 °F (36.6 °C) 74 18 (!) 112/53 99 %   03/21/22 0537 97.6 °F (36.4 °C) 72 18 130/61 95 %   03/21/22 0534 97.6 °F (36.4 °C) 74 18 (!) 155/74 96 %         Mobility   Score Comments   Sit to Stand 4: Supervision or touching A SBA   Transfer Assist 4: Supervision or touching A Transfer Type: SPT   Equipment: Rolling Walker   Comments: CGA-SBA, [place] to manage O2 NC, cues for untangling feet safely/in sitting rather than trying to step over NC in stance      Activities of Daily Living    Score Comments   Eating 6: Independent    Oral Hygiene 6: Independent    Upper Body  Dressing 5: S/U or clean-up assist Items Applied: Pullover  Position: Unsupported Sitting   Lower Body Dressing 4: Supervision or touching A Items Applied: Underwear and Elastic pants  Position: Standing PRN and Unsupported Sitting  Adaptive Equipment: RW  Comments: SBA in stance   Education benefits of OT, functional transfer training and safety awareness     Assessment: Patient limited by complaints of pain and dyspnea, RN notified of head ache. SpO2 stable. Pt continues to benefit from skilled OT services to address remaining deficits and return back to baseline with improved independence and safety during I/ADLs. Patient ended session seated in recliner with call bell and needs within reach. Plan: Continue OT POC.      Venkata Strickland OTR/L   3/21/2022

## 2022-03-21 NOTE — PROGRESS NOTES
PHYSICAL THERAPY DAILY NOTE  Time In: 1116  Time Out: 4257  Patient Seen For: AM;Gait training;Patient education; Therapeutic exercise;Transfer training; Other (see progress notes)    Subjective: Patient reporting the tylenol helps some with her headaches. Reports the pain medication helps with right hip pain         Objective:Vital Signs:02 at 1 lpm, 02 sat 92 to 100% during treatment  Patient Vitals for the past 12 hrs:   Temp Pulse Resp BP SpO2   03/21/22 0904     95 %   03/21/22 0733  79  (!) 168/76    03/21/22 0722    (!) 168/76    03/21/22 0720  79  (!) 184/83 92 %   03/21/22 0710 97.8 °F (36.6 °C) 74 18 (!) 112/53 99 %   03/21/22 0537 97.6 °F (36.4 °C) 72 18 130/61 95 %   03/21/22 0534 97.6 °F (36.4 °C) 74 18 (!) 155/74 96 %     Pain level:No c/o pain during treatment  Pain location:NA  Pain interventions:NA    Patient education:,transfer training, gait training, balance training,fall precautions, body mechanics,activity pacing, energy conservation, Patient verbalizing understanding and demonstrating  understanding of patient education. Recommend follow up education.     Interdisciplinary Communication:spoke with 02 regarding functional status    Other (comment) (Fall precautions, WBAT RLE)  GROSS ASSESSMENT Daily Assessment     NA       COGNITION Daily Assessment    Alert, able to follow commands,cooperating,participating, motivated,          BED/MAT MOBILITY Daily Assessment    Rolling Right : 0 (Not tested)  Rolling Left : 0 (Not tested)  Supine to Sit : 0 (Not tested)  Sit to Supine : 0 (Not tested)       TRANSFERS Daily Assessment   Increased time and effort to complete with cues for body mechanics   Transfer Type: SPT with walker  Transfer Assistance : 5 (Stand-by assistance)  Sit to Stand Assistance: Stand-by assistance  Car Transfers: Not tested       GAIT Daily Assessment    Amount of Assistance: 5 (Stand-by assistance)  Distance (ft): 100 Feet (ft) (100ft x 2 with patient managing 02 line) making one 180 degree turn while managing 02 line  Assistive Device: Walker, rolling   Gait training with one time cue for managing 02 line and controlling gait speed with RR.    STEPS or STAIRS Daily Assessment    Steps/Stairs Ambulated (#): 0  Level of Assist : 0 (Not tested)       BALANCE Daily Assessment   No loss of balance during transfer or gait training Sitting - Static: Good (unsupported)  Sitting - Dynamic: Good (unsupported)  Standing - Static: Fair  Standing - Dynamic : Impaired       WHEELCHAIR MOBILITY Daily Assessment    Curbs/Ramps Assist Required (FIM Score): 0 (Not tested)  Wheelchair Setup Assist Required : 0 (Not tested)       LOWER EXTREMITY EXERCISES Daily Assessment      Extremity: Both  Exercise Type #1: Other (comment) (LE motomed x 10 mins at level 1)  Sets Performed: 2  Reps Performed: 10  Level of Assist: Supervision            Assessment: Progressing towards goals. 02 sat stable on 02 at 1 lpm. Demonstrating understanding of how to manage 02 line during gait with RW. Patient returned to room at end of treatment and remained up in recliner with LEs elevated and with needs in reach. 02 at 1 lpm    Plan of Care: Continue with POC and progress as tolerated.      Rayma Cooks, PT  3/21/2022

## 2022-03-21 NOTE — PROGRESS NOTES
Bree Quarles MD  Medical Director  [address] [facility], [address] [facility]  Tel: [phone] PROGRESS NOTE    [address] [place]  Admit Date: 3/18/2022  Admit Diagnosis:   SDH (subdural hematoma) ([facility] [S06.5X9A]    Subjective     Patient seen and examined before AM therapies, OOB in recliner after breakfast. Reported chest pain and HA (pain 7/10) earlier to RN; chest pain resolved spontaneously, right-sided HA resolved with APAP. Patient also self-administered one of her bedside inhalers with good relief. Slept well. Voiding without issue, bowels moving. /53 then 168/76 (with HA, CP), recheck before PT; HR consistently in 70s. Hgb 8.3 drifting down. Will ask Neurosurgery about restarting Plavix.     Objective:     Current Facility-Administered Medications   Medication Dose Route Frequency    azithromycin (ZITHROMAX) tablet 500 mg  500 mg Oral Q MON, WED & FRI    budesonide-formoteroL (SYMBICORT) 160-4.5 mcg/actuation HFA inhaler 2 Puff  2 Puff Inhalation BID RT    escitalopram oxalate (LEXAPRO) tablet 10 mg  10 mg Oral DAILY    ezetimibe (ZETIA) tablet 10 mg  10 mg Oral DAILY    gabapentin (NEURONTIN) capsule 100 mg  100 mg Oral TID    LORazepam (ATIVAN) tablet 0.5 mg  0.5 mg Oral Q8H PRN    methIMAzole (TAPAZOLE) tablet 10 mg  10 mg Oral DAILY    metoprolol succinate (TOPROL-XL) XL tablet 100 mg  100 mg Oral DAILY    nitroglycerin (NITROSTAT) tablet 0.4 mg  0.4 mg SubLINGual PRN    ondansetron (ZOFRAN ODT) tablet 4 mg  4 mg Oral Q6H PRN    oxyCODONE IR (ROXICODONE) tablet 5 mg  5 mg Oral Q4H PRN    pantoprazole (PROTONIX) tablet 40 mg  40 mg Oral ACB&D    polyethylene glycol (MIRALAX) packet 17 g  17 g Oral DAILY    predniSONE (DELTASONE) tablet 15 mg  15 mg Oral DAILY WITH BREAKFAST    tiotropium bromide (SPIRIVA RESPIMAT) 2.5 mcg /actuation  2 Puff Inhalation DAILY    traZODone (DESYREL) tablet 50 mg 50 mg Oral QHS PRN    acetaminophen (TYLENOL) tablet 650 mg  650 mg Oral Q4H PRN    atorvastatin (LIPITOR) tablet 80 mg  80 mg Oral QHS    bisacodyL (DULCOLAX) tablet 5 mg  5 mg Oral DAILY PRN    senna-docusate (PERICOLACE) 8.6-50 mg per tablet 2 Tablet  2 Tablet Oral QHS    guaiFENesin ER (MUCINEX) tablet 600 mg  600 mg Oral Q12H    aspirin chewable tablet 81 mg  81 mg Oral DAILY       Review of Systems:   Denies cough, visual problems, abdominal pain, dysuria, calf pain. Pertinent positives are as noted in the HPI, ROS unremarkable otherwise. Visit Vitals  BP (!) 168/76   Pulse 79   Temp 97.8 °F (36.6 °C)   Resp 18   SpO2 99%        Physical Exam:   General: Alert, oriented x 4. OOB in recliner after breakfast.   HEENT: Normocephalic but with mildly tender pecan-sized hematoma at right frontotemporal skull. Oral mucosa moist.   Lungs: Coarse breath sounds without adventitious sounds. Prolonged expiration, respirations unlabored. Heart: Regular rate and rhythm, normal S1, S2. No appreciable murmur. Abdomen: Soft, non-tender, not distended. Bowel sounds normoactive, no organomegaly. Genitourinary: Deferred. Neuromuscular:      Delayed processing (mild), speech clear, thoughts cogent. PERRLA, EOM intact, visual fields full to finger confrontation. No gross focal neuro deficits. UE strength at biceps, triceps and  4+/5 and symmetric bilaterally. LE strength at HF 4-/5 (R), 4/5 (L), distally 4+/5 and surprisingly symmetric s/p right IT fracture. Sensation intact distally. Skin/extremity: No rashes, no erythema. Calves soft, non-tender B LE. Multiple resolving ecchymoses to UE, R>>L. No pedal edema.        Functional Assessment:  Balance  Sitting - Static: Good (unsupported) (03/18/22 1500)  Sitting - Dynamic: Good (unsupported) (03/18/22 1500)  Standing - Static: Fair (03/18/22 1500)       Terese Grimes Fall Risk Assessment:  Terese Grimes Fall Risk  Mobility: Ambulates or transfers with assist devices or assistance (03/21/22 0732)  Mobility Interventions: Utilize walker, cane, or other assistive device (03/20/22 2212)  Mentation: Alert, oriented x 3 (03/20/22 2212)  Medication: Patient receiving anticonvulsants, sedatives(tranquilizers), psychotropics or hypnotics, hypoglycemics, narcotics, sleep aids, antihypertensives, laxatives, or diuretics (03/20/22 2212)  Medication Interventions: Bed/chair exit alarm (03/20/22 2212)  Elimination: Needs assistance with toileting (03/20/22 2212)  Elimination Interventions: Call light in reach (03/20/22 2212)  Prior Fall History: Before admission in past 12 months _home or previous inpatient care) (03/20/22 2212)  History of Falls Interventions: Bed/chair exit alarm (03/20/22 2212)  Total Score: 4 (03/20/22 2212)  Standard Fall Precautions: Yes (03/18/22 1340)  High Fall Risk: Yes (03/20/22 2212)     Ambulation:  Gait  Distance (ft): 150 Feet (ft) (03/19/22 1200)  Assistive Device: Viviana Kwesi, rolling;Gait belt (03/19/22 1200)     Labs/Studies:  Recent Results (from the past 72 hour(s))   COVID-19 RAPID TEST    Collection Time: 03/18/22 10:37 AM   Result Value Ref Range    Specimen source NASAL      COVID-19 rapid test Not detected NOTD     TROPONIN-HIGH SENSITIVITY    Collection Time: 03/20/22  5:40 PM   Result Value Ref Range    Troponin-High Sensitivity 31.1 (H) 0 - 14 pg/mL   EKG, 12 LEAD, INITIAL    Collection Time: 03/20/22  5:45 PM   Result Value Ref Range    Ventricular Rate 82 BPM    Atrial Rate 82 BPM    P-R Interval 152 ms    QRS Duration 70 ms    Q-T Interval 394 ms    QTC Calculation (Bezet) 460 ms    Calculated P Axis 88 degrees    Calculated R Axis 60 degrees    Calculated T Axis 110 degrees    Diagnosis       Normal sinus rhythm  T wave abnormality, consider lateral ischemia  When compared with ECG of 27-FEB-2022 10:03,  No significant change was found  Confirmed by Lazaro Velasquez MD, Dick Hamilton ([id number]) on 3/20/2022 9:30:15 PM     TROPONIN-HIGH SENSITIVITY Collection Time: 03/21/22  2:05 AM   Result Value Ref Range    Troponin-High Sensitivity 32.6 (H) 0 - 14 pg/mL   HGB & HCT    Collection Time: 03/21/22  2:05 AM   Result Value Ref Range    HGB 8.3 (L) 11.7 - 15.4 g/dL    HCT 27.0 (L) 35.8 - 58.1 %   METABOLIC PANEL, COMPREHENSIVE    Collection Time: 03/21/22  2:05 AM   Result Value Ref Range    Sodium 140 136 - 145 mmol/L    Potassium 4.1 3.5 - 5.1 mmol/L    Chloride 105 98 - 107 mmol/L    CO2 29 21 - 32 mmol/L    Anion gap 6 (L) 7 - 16 mmol/L    Glucose 121 (H) 65 - 100 mg/dL    BUN 13 8 - 23 MG/DL    Creatinine 0.70 0.6 - 1.0 MG/DL    GFR est AA >60 >60 ml/min/1.73m2    GFR est non-AA >60 >60 ml/min/1.73m2    Calcium 8.9 8.3 - 10.4 MG/DL    Bilirubin, total 0.4 0.2 - 1.1 MG/DL    ALT (SGPT) 17 12 - 65 U/L    AST (SGOT) 11 (L) 15 - 37 U/L    Alk.  phosphatase 81 50 - 136 U/L    Protein, total 5.7 (L) 6.3 - 8.2 g/dL    Albumin 2.6 (L) 3.2 - 4.6 g/dL    Globulin 3.1 2.3 - 3.5 g/dL    A-G Ratio 0.8 (L) 1.2 - 3.5     NT-PRO BNP    Collection Time: 03/21/22  2:05 AM   Result Value Ref Range    NT pro-BNP 4,033 (H) 5 - 125 PG/ML       Assessment:     Problem List as of 3/21/2022 Date Reviewed: 3/21/2022          Codes Class Noted - Resolved    Chest pain ICD-10-CM: R07.9  ICD-9-CM: 786.50  5/31/2019 - Present        * (Principal) SDH (subdural hematoma) ([facility] ICD-10-CM: S36.5X4E  ICD-9-CM: 432.1  3/18/2022 - Present        Post-traumatic subdural hematoma ([facility] ICD-10-CM: O17.8C5Z  ICD-9-CM: 852.20  3/15/2022 - Present        Hypokalemic alkalosis ICD-10-CM: E87.3  ICD-9-CM: 276.3  3/15/2022 - Present        EKG, abnormal ICD-10-CM: R94.31  ICD-9-CM: 794.31  2/15/2022 - Present        Femur fracture, right ([facility] ICD-10-CM: C74.93GX  ICD-9-CM: 821.00  2/11/2022 - Present        COPD with acute lower respiratory infection ([facility] ICD-10-CM: J44.0  ICD-9-CM: 496, 519.8  2/10/2022 - Present        Moderate to severe mitral regurgitation ICD-10-CM: I34.0  ICD-9-CM: 424.0 2/8/2022 - Present        Iron deficiency anemia due to chronic blood loss (Chronic) ICD-10-CM: D50.0  ICD-9-CM: 280.0  2/8/2022 - Present        Closed right hip fracture ([facility] [address] ICD-10-CM: S72.001A  ICD-9-CM: 820.8  2/4/2022 - Present        Other fracture of right femur, initial encounter for closed fracture ([facility] [address] ICD-10-CM: Z38.6R8K  ICD-9-CM: 821.00  2/4/2022 - Present        Multiple closed anterior-posterior compression fractures of pelvis ([facility] [address] ICD-10-CM: J38.41ML  ICD-9-CM: 808.44  2/2/2022 - Present        DDD (degenerative disc disease), lumbar ICD-10-CM: M51.36  ICD-9-CM: 722.52  2/2/2022 - Present        DNR (do not resuscitate) ICD-10-CM: Z66  ICD-9-CM: V49.86  11/9/2021 - Present    Overview Signed 11/9/2021 12:25 PM by Akhil Santiago NP     POST form completed - DNR but full treatment, including intubation. No TRACH. No PEG. Acute bilateral low back pain without sciatica ICD-10-CM: M54.50  ICD-9-CM: 724.2, 338.19  11/9/2021 - Present        Palliative care patient ICD-10-CM: Z51.5  ICD-9-CM: V66.7  7/14/2021 - Present        Depression ICD-10-CM: F32. A  ICD-9-CM: 704  5/6/2021 - Present        Anxiety ICD-10-CM: F41.9  ICD-9-CM: 300.00  5/6/2021 - Present        Ischemic heart disease, hx pci, cath/pci last 5/2019 ICD-10-CM: I25.9  ICD-9-CM: 414.9  2/24/2021 - Present        H/O carotid endarterectomy ICD-10-CM: Z98.890  ICD-9-CM: V45.89  9/21/2020 - Present        Right ear pain ICD-10-CM: H92.01  ICD-9-CM: 388.70  9/21/2020 - Present        Pulmonary mass ICD-10-CM: R91.8  ICD-9-CM: 786.6  2/11/2020 - Present        Centrilobular emphysema ([address] ICD-10-CM: J43.2  ICD-9-CM: 492.8  12/20/2019 - Present        Carotid stenosis ICD-10-CM: I65.29  ICD-9-CM: 433.10  12/3/2019 - Present        Carotid stenosis, right ICD-10-CM: I65.21  ICD-9-CM: 433.10  12/3/2019 - Present        Hypertensive urgency ICD-10-CM: I16.0  ICD-9-CM: 401.9  6/8/2019 - Present        Chronic respiratory failure with hypoxia ([facility] (Chronic) ICD-10-CM: J96.11  ICD-9-CM: 518.83, 799.02  6/8/2019 - Present        CAD S/P percutaneous coronary angioplasty ICD-10-CM: I25.10, Z98.61  ICD-9-CM: 414.01, V45.82  5/31/2019 - Present        Ventricular ectopy ICD-10-CM: I49.3  ICD-9-CM: 427.69  5/9/2017 - Present        Chronic anxiety ICD-10-CM: F41.9  ICD-9-CM: 300.00  4/27/2017 - Present        Hyperlipidemia (Chronic) ICD-10-CM: E78.5  ICD-9-CM: 272.4  10/10/2016 - Present        Essential hypertension, benign (Chronic) ICD-10-CM: I10  ICD-9-CM: 401.1  10/10/2016 - Present        Coronary artery disease involving native coronary artery of native heart without angina pectoris ICD-10-CM: I25.10  ICD-9-CM: 414.01  10/10/2016 - Present        Hypoxemia ICD-10-CM: R09.02  ICD-9-CM: 799.02  8/25/2013 - Present    Overview Signed 2/5/2014  9:08 AM by Jean Horan NP     FILIBERTO 8/2013. Pt had over 2 hours 1/2 hours of desaturations at night. O2 was ordered for her to start at 2L in September, but she stated she was feeling better than when FILIBERTO was done and refused O2. FILIBERTO 12/2013: not performed secondary to patient factors.               Acute respiratory failure with hypoxia (HCC) ICD-10-CM: J96.01  ICD-9-CM: 518.81  8/22/2013 - Present        COPD, very severe (HCC) (Chronic) ICD-10-CM: J44.9  ICD-9-CM: 496  8/20/2013 - Present        Personal history of tobacco use ICD-10-CM: Y61.579  ICD-9-CM: V15.82  8/19/2013 - Present        History of MI (myocardial infarction) ICD-10-CM: I25.2  ICD-9-CM: 411  8/19/2013 - Present    Overview Addendum 8/30/2018  9:20 AM by Linda Jacobson MD     STEMI 8/19/13:  Angioplasty for in-stent stenosis to LAD             RESOLVED: Chronic obstructive pulmonary disease ([facility] ICD-10-CM: J44.9  ICD-9-CM: 496  10/10/2016 - 8/30/2018        RESOLVED: Acute systolic heart failure ([facility] ICD-10-CM: I50.21  ICD-9-CM: 428.21  8/22/2013 - 8/30/2018        RESOLVED: Pulmonary hemorrhage ICD-10-CM: R04.89  ICD-9-CM: 786.30  8/20/2013 - 6/25/2019        RESOLVED: CAD (coronary artery disease) (Chronic) ICD-10-CM: I25.10  ICD-9-CM: 414.00  8/19/2013 - 8/3/2021        RESOLVED: COPD (chronic obstructive pulmonary disease) (HCC) (Chronic) ICD-10-CM: J44.9  ICD-9-CM: 496  8/19/2013 - 8/20/2013            S/p traumatic subdural hematoma     Plan / Recommendations / Medical Decision Making:     Continue daily physician / PA medical management:    Subdural hematoma - PT/OT; good rehab potential.     S/p right IT fracture 2/2022 now s/p fall with fx of the lesser trochanter; WBAT. No surgery required, per Dr Sathya Beckwith.     Hyperlipidemia - continue Lipitor and Zetia.     COPD - O2- and steroid-dependent, 1-2 L O2. Continue Symbicort, Spiriva and prednisone 15mg. Encourage IS. She is on prophylactic azithromycin 500mg MWF for inflammation due to COPD / emphysema. There was concern during hospitalization 2/2022 of pulmonary malignancy with a 2.8cm left perihilar lower lobe spiculated mass per CT chest 2/26/2022.     Hx of MI - s/p stent to LAD and distal RCA 2/7/2022; continue statin, was on Effient at home. Plans to restart ASA and begin Plavix; will wait 2wks post SDH; this was a very small bleed. ASA 81mg restarted on [facility] admission; monitor clinically. Seen by Dr Bernabe Turner 3/16.  -3/21 per Cardiology, Sean Reynolds I would like to start back dual antiplatelet therapy (using aspirin and clopidogrel) in 3 to 4 days if possible\"; per Neurosurgery, okay to start Plavix     Hypertension - BP fluctuating, managed medically. Continue Toprol XL, PRN SL NTG.  -3/20 HR 72, /74  -3/21 /53 this AM then 168/76 with HA, CP; HR consistently in 70s; monitor during therapy     Anemia - Hgb 9.3 and stable.  -3/21 Hgb 8.3, drifting down; recheck in 2d     Hypokalemia - resolved; monitor and replace if needed. -3/21 K+ 4.1 today    Pain management - fairly recent right hip fracture.  Will require regular pain assessment and comprehensive pain management. PRN oxycodone and APAP; history of T12-L1 compression fractures.     Pneumonia prophylaxis - incentive spirometer every hour while awake.     DVT risk / DVT prophylaxis - daily physician / PA exam to assess as patient is at increased risk for of thromboembolism. Mobilize as tolerated. Sequential pneumatic compression devices (SCDs) when in bed; thigh-high or knee-high thromboembolic deterrent hose when out of bed. No anticoagulation due to SDH.  -3/21 on ASA since 3/19; okay to start Plavix per NSG     GERD - continue PPI BID. At times may need additional antacids, Maalox prn. Depression - continue on Lexapro. At risk of depression exacerbation due to re-hospitalization and decline in functional mobility and independence. Resume PRN Ativan for anxiety.     General skin care / wound prevention - monitor general skin status daily per staff and physician / PA. At risk for failure due to impaired mobility. Patient with multiple bruises and friable skin.     Bladder program - voiding without difficulty.     Bowel program - at risk for constipation as a side effect of opioids, other medications, impaired mobility, etc. MiraLAX daily for regularity, Marisela-Colace for stool softener. PRN MOM, bisacodyl suppository or tablets for constipation.     Hyperthyroidism - continue Tapazole. Time spent was 35 minutes with over 1/2 in direct patient care/examination, consultation and coordination of care. Signed By: Luz Marina Recio PA-C    March 21, 2022      Physician Assistant with [facility]  Amy R. Terrence Simmonds, MD, Medical Director

## 2022-03-22 PROCEDURE — 94640 AIRWAY INHALATION TREATMENT: CPT

## 2022-03-22 PROCEDURE — 97110 THERAPEUTIC EXERCISES: CPT

## 2022-03-22 PROCEDURE — 65310000000 HC RM PRIVATE REHAB

## 2022-03-22 PROCEDURE — 74011250637 HC RX REV CODE- 250/637: Performed by: PHYSICAL MEDICINE & REHABILITATION

## 2022-03-22 PROCEDURE — 97535 SELF CARE MNGMENT TRAINING: CPT

## 2022-03-22 PROCEDURE — 97530 THERAPEUTIC ACTIVITIES: CPT

## 2022-03-22 PROCEDURE — 3331090002 HH PPS REVENUE DEBIT

## 2022-03-22 PROCEDURE — 74011636637 HC RX REV CODE- 636/637: Performed by: PHYSICAL MEDICINE & REHABILITATION

## 2022-03-22 PROCEDURE — 97116 GAIT TRAINING THERAPY: CPT

## 2022-03-22 PROCEDURE — 3331090001 HH PPS REVENUE CREDIT

## 2022-03-22 PROCEDURE — 74011250637 HC RX REV CODE- 250/637: Performed by: PHYSICIAN ASSISTANT

## 2022-03-22 RX ORDER — CALCIUM CARBONATE/VITAMIN D3 250-3.125
1 TABLET ORAL DAILY
Status: DISCONTINUED | OUTPATIENT
Start: 2022-03-23 | End: 2022-03-31 | Stop reason: HOSPADM

## 2022-03-22 RX ORDER — TORSEMIDE 20 MG/1
10 TABLET ORAL DAILY
Status: DISCONTINUED | OUTPATIENT
Start: 2022-03-23 | End: 2022-03-31 | Stop reason: HOSPADM

## 2022-03-22 RX ADMIN — CLOPIDOGREL BISULFATE 75 MG: 75 TABLET ORAL at 08:57

## 2022-03-22 RX ADMIN — ASPIRIN 81 MG: 81 TABLET, CHEWABLE ORAL at 08:57

## 2022-03-22 RX ADMIN — LORAZEPAM 0.5 MG: 0.5 TABLET ORAL at 00:11

## 2022-03-22 RX ADMIN — BUDESONIDE AND FORMOTEROL FUMARATE DIHYDRATE 2 PUFF: 160; 4.5 AEROSOL RESPIRATORY (INHALATION) at 21:19

## 2022-03-22 RX ADMIN — OXYCODONE HYDROCHLORIDE 5 MG: 5 TABLET ORAL at 20:39

## 2022-03-22 RX ADMIN — PANTOPRAZOLE SODIUM 40 MG: 40 TABLET, DELAYED RELEASE ORAL at 05:40

## 2022-03-22 RX ADMIN — GABAPENTIN 100 MG: 100 CAPSULE ORAL at 21:57

## 2022-03-22 RX ADMIN — LORAZEPAM 0.5 MG: 0.5 TABLET ORAL at 21:57

## 2022-03-22 RX ADMIN — METOPROLOL SUCCINATE 100 MG: 50 TABLET, EXTENDED RELEASE ORAL at 08:57

## 2022-03-22 RX ADMIN — PREDNISONE 15 MG: 10 TABLET ORAL at 08:57

## 2022-03-22 RX ADMIN — ACETAMINOPHEN 650 MG: 325 TABLET ORAL at 22:26

## 2022-03-22 RX ADMIN — ACETAMINOPHEN 650 MG: 325 TABLET ORAL at 05:41

## 2022-03-22 RX ADMIN — PANTOPRAZOLE SODIUM 40 MG: 40 TABLET, DELAYED RELEASE ORAL at 17:43

## 2022-03-22 RX ADMIN — ESCITALOPRAM OXALATE 10 MG: 10 TABLET ORAL at 08:57

## 2022-03-22 RX ADMIN — ACETAMINOPHEN 650 MG: 325 TABLET ORAL at 17:47

## 2022-03-22 RX ADMIN — ATORVASTATIN CALCIUM 80 MG: 80 TABLET, FILM COATED ORAL at 21:56

## 2022-03-22 RX ADMIN — TIOTROPIUM BROMIDE INHALATION SPRAY 2 PUFF: 3.12 SPRAY, METERED RESPIRATORY (INHALATION) at 05:22

## 2022-03-22 RX ADMIN — ACETAMINOPHEN 650 MG: 325 TABLET ORAL at 08:57

## 2022-03-22 RX ADMIN — GUAIFENESIN 600 MG: 600 TABLET ORAL at 08:57

## 2022-03-22 RX ADMIN — METHIMAZOLE 10 MG: 5 TABLET ORAL at 08:57

## 2022-03-22 RX ADMIN — BUDESONIDE AND FORMOTEROL FUMARATE DIHYDRATE 2 PUFF: 160; 4.5 AEROSOL RESPIRATORY (INHALATION) at 05:24

## 2022-03-22 RX ADMIN — GUAIFENESIN 600 MG: 600 TABLET ORAL at 21:56

## 2022-03-22 RX ADMIN — OXYCODONE HYDROCHLORIDE 5 MG: 5 TABLET ORAL at 05:39

## 2022-03-22 RX ADMIN — NITROGLYCERIN 0.4 MG: 0.4 TABLET SUBLINGUAL at 09:10

## 2022-03-22 RX ADMIN — GABAPENTIN 100 MG: 100 CAPSULE ORAL at 08:57

## 2022-03-22 RX ADMIN — EZETIMIBE 10 MG: 10 TABLET ORAL at 08:57

## 2022-03-22 RX ADMIN — GABAPENTIN 100 MG: 100 CAPSULE ORAL at 17:43

## 2022-03-22 RX ADMIN — OXYCODONE HYDROCHLORIDE 5 MG: 5 TABLET ORAL at 09:44

## 2022-03-22 NOTE — PROGRESS NOTES
OT Daily Note    Time In 0839   Time Out 0924     Subjective: Pt c/o dizziness upon first standing. Pt c/o chest pain during session, reports it resolved after nitroglycerine. Objective: BP checked after c/o dizziness: sitting 134/69, standing 106/58. Seated BP after c/o chest pain 192/102. RN presents, administers nitroglycerine. /77. Pain: RN notified  Interdisciplinary Communication: Communicated with RN and PT to ensure progress towards POC and goals. Precautions: Falls, WBAT RLE     Patient Vitals for the past 8 hrs:   Temp Pulse Resp BP SpO2   03/22/22 1127 97.9 °F (36.6 °C) 63 18 (!) 111/57 98 %   03/22/22 0906    (!) 192/102    03/22/22 0842    (!) 106/58    03/22/22 0840    134/69    03/22/22 0738 98.1 °F (36.7 °C) 70  (!) 115/53 98 %   03/22/22 0527     96 %       Mobility   Score Comments   Sit to Stand 4: Supervision or touching A CGA-SBA, cues for safety, RW proximity, O2 NC management   Transfer Assist 4: Supervision or touching A Transfer Type: SPT   Equipment: Rolling Walker   Comments: CGA 2° c/o dizziness     Activities of Daily Living    Score Comments   Eating 6: Independent    Bathing Not Tested Type of Shower: Shower  Position: Unsupported Sitting   Adaptive Equipment: Tub Transfer Bench and Grab Bars  Comments: Pt completed shower transfer, c/o CP once in shower, shower terminated. Upper Body  Dressing 5: S/U or clean-up assist Items Applied: Pullover and Bra  Position: Unsupported Sitting   Lower Body Dressing 3: Partial/Moderate A Items Applied: Underwear and Elastic pants  Position: Supported sitting and Standing PRN  Adaptive Equipment: RW  Comments: Additional assist provided 2° CP and elevated BP   Donning/[place] Footwear 3: Partial/Moderate A Items Applied: Socks  Adaptive Equipment: N/A  Comments:  Independent to doff, dependent to don 2° CP and BP   Toilet Transfer 4: Supervision or touching A Transfer Type: SPT   Equipment: Rolling Walker   Comments: CGA, cues for safety   Toileting Hygiene 4: Supervision or touching A Output: urine x1  Comments: SBA   Education Activity pacing, Fall precautions, Functional transfer training, O2 nasal cannula management training, Rolling walker management and Safety awareness     Assessment: Patient with complaints of dizziness and chest pain this session limiting functional activity. Pt continues to benefit from skilled OT services to address remaining deficits and return back to baseline with improved independence and safety during I/ADLs. Patient ended session seated in wheelchair with PT. Plan: Continue OT POC.      Sherie Hernandes OTR/L   3/22/2022

## 2022-03-22 NOTE — PROGRESS NOTES
OT Daily Note  Time In 1115   Time Out 1202     Subjective: Pt c/o dizziness in sitting while trying to don jacket. Objective: /65 in sitting, HR 67.    Pain: No pain expressed  Interdisciplinary Communication: Collaborated with PT to ensure progress towards POC and goals. Precautions: Falls, WBAT RLE     Patient Vitals for the past 8 hrs:   Temp Pulse Resp BP SpO2   03/22/22 1127 97.9 °F (36.6 °C) 63 18 (!) 111/57 98 %   03/22/22 1120  63  125/65 98 %   03/22/22 0952  92 18 (!) 90/49 96 %   03/22/22 0950  92 18 120/67 96 %   03/22/22 0906    (!) 192/102    03/22/22 0842    (!) 106/58    03/22/22 0840    134/69    03/22/22 0738 98.1 °F (36.7 °C) 70  (!) 115/53 98 %         Functional Mobility    Score Comments   Sit to Stand 4: Supervision or touching A SBA   Transfer Assist 4: Supervision or touching A Transfer Type: SPT   Equipment: Rolling Walker   Comments: CGA, cues to turn toward O2 NC and keep RW close         Activity Tolerance and Strengthening   Pt completed reaching, strengthening, and activity tolerance utilizing BUE(s) to move rings L to R across, up, and down rungs of vertical ladder. Pt completed activity with bilateral 1.5 lb wrist weights on to promote shoulder strengthening. Pt cued for shoulder external rotation to neutral when reaching to/above shoulder height to prevent impingement. Pt required rest breaks throughout. Pt completed activity in sitting position at table top. Deferred standing activities this session, as PT reports pt with symptomatic hypotension during their session. Activity Tolerance and Strengthening   Pt completed BUE strengthening exercises while seated in w/c:   Pt required rest breaks throughout.    BUE Exercises  Repetitions Dowel Bar Weight   Bicep Curls 2 x 20  4 lb   Chest Press 15 x 1   4 lb   Dead Lift 10 x 1   4 lb        Education   Activity pacing and Benefits of OT     Assessment: Pt demonstrated good participation in OT treatment, reports the dowel exercises are good for her breathing. Pt continues to benefit from skilled OT services to address remaining deficits and return back to baseline with improved independence and safety during I/ADLs. Patient ended session seated in wheelchair with call bell and needs within reach and  at bedside. Plan: Continue OT POC.      Dante Piper OTR/L   3/22/2022

## 2022-03-22 NOTE — PROGRESS NOTES
PHYSICAL THERAPY DAILY NOTE  Time In: 1302  Time Out: 1334  Patient Seen For: PM;Gait training;Patient education;Transfer training; Other (see progress notes)    Subjective: Patient reporting she feels better. No c/o dizziness or chest pain. Reports she feels like she can walk further. Objective:Vital Signs:02 at 1 lpm, 02 sat 95 to 100% during treatment HR 74 to 86. Initial sitting /68 HR 78, Standing /65 HR 92  Patient Vitals for the past 12 hrs:   Temp Pulse Resp BP SpO2   03/22/22 1127 97.9 °F (36.6 °C) 63 18 (!) 111/57  98 %   03/22/22 0952  92 18 (!) 90/49standing 96 %   03/22/22 0950  92 18 120/67 sitting 96 %   03/22/22 0906    (!) 192/102    03/22/22 0842    (!) 106/58    03/22/22 0840    134/69    03/22/22 0738 98.1 °F (36.7 °C) 70  (!) 115/53 98 %   03/22/22 0527     96 %     Pain level:No c/o pain during treatment      Patient education:,transfer training, gait training, balance training,fall precautions, body mechanics,activity pacing, energy conservation, Patient verbalizing understanding and demonstrating  understanding of patient education. Recommend follow up education.     Interdisciplinary Communication:spoke with OT, PA and RN regarding orthostatic hypotension    Other (comment) (Fall precautions, WBAT RLE)  GROSS ASSESSMENT Daily Assessment     NA       COGNITION Daily Assessment    Alert, able to follow commands,cooperating,participating, motivated,          BED/MAT MOBILITY Daily Assessment    Rolling Right : 0 (Not tested)  Rolling Left : 0 (Not tested)  Supine to Sit : 0 (Not tested)  Sit to Supine : 0 (Not tested)       TRANSFERS Daily Assessment   Increased time and effort to complete with cues for body mechanics   Transfer Type: SPT with walker  Transfer Assistance : 5 (Stand-by assistance)  Sit to Stand Assistance: Stand-by assistance  Car Transfers: Not tested       GAIT Daily Assessment    Amount of Assistance: 5 (Stand-by assistance)  Distance (ft): 100 Feet (ft)   100ft x 1, 80 ft x 3 with patient managing 02 line and making one 180 degree turn after 40 ft while managing 02 line. Assistive Device: Walker, rolling   Gait training with one time cue for managing 02 line and controlling gait speed with RR.    STEPS or STAIRS Daily Assessment   Increased time and effort to complete Steps/Stairs Ambulated (#): 1 ( 3 inch step, completed 2 times)  Level of Assist : 4 (Contact guard assistance)  Rail Use:  (with RW)       BALANCE Daily Assessment   No loss of balance during transfer or gait training Sitting - Static: Good (unsupported)  Sitting - Dynamic: Good (unsupported)  Standing - Static: Fair  Standing - Dynamic : Impaired       WHEELCHAIR MOBILITY Daily Assessment    Curbs/Ramps Assist Required (FIM Score): 0 (Not tested)  Wheelchair Setup Assist Required : 0 (Not tested)       LOWER EXTREMITY EXERCISES Daily Assessment      NA              Assessment: BP stable this PM with improved tolerance to treatment. No signs of orthostatic hypotension this PM. 02 sat stable on 02 at 1 lpm. Functional endurance improving. Progress gait distance and independence as medical status stabilizes        Patient returned to room at end of treatment and remained up in recliner with LEs elevated and with needs in reach. 02 at 1 lpm    Plan of Care: Continue with POC and progress as tolerated.      Andrew Christianson, PT  3/22/2022

## 2022-03-22 NOTE — PROGRESS NOTES
Problem: Falls - Risk of  Goal: *Absence of Falls  Description: Document [place] Fall Risk and appropriate interventions in the flowsheet.   Outcome: Progressing Towards Goal  Note: Fall Risk Interventions:  Mobility Interventions: Patient to call before getting OOB,Utilize walker, cane, or other assistive device         Medication Interventions: Patient to call before getting OOB,Teach patient to arise slowly    Elimination Interventions: Call light in reach,Patient to call for help with toileting needs,Toileting schedule/hourly rounds    History of Falls Interventions: Consult care management for discharge planning         Problem: Patient Education: Go to Patient Education Activity  Goal: Patient/Family Education  Outcome: Progressing Towards Goal

## 2022-03-22 NOTE — PROGRESS NOTES
Pt c/o of chest pain. Nitroglycerin given. /102. After 5 min, pt reported the chest pain had subsided.   /77, HR 92

## 2022-03-22 NOTE — PROGRESS NOTES
PHYSICAL THERAPY DAILY NOTE  Time In: 5992  Time Out: 1004  Patient Seen For: AM;Gait training;Patient education; Therapeutic exercise;Transfer training; Other (see progress notes)    Subjective: Patient reporting she hand chest pain and felt weak this AM during ADLs. Reports she feels a little better now but is concerned about her elevated BP         Objective:Vital Signs:02 at 1 lpm, 02 sat 94 to 100% during treatment HR 84 to 93  Patient Vitals for the past 12 hrs:   Temp Pulse Resp BP SpO2   03/22/22 1127 97.9 °F (36.6 °C) 63 18 (!) 111/57  98 %   03/22/22 0952  92 18 (!) 90/49standing 96 %   03/22/22 0950  92 18 120/67 sitting 96 %   03/22/22 0906    (!) 192/102    03/22/22 0842    (!) 106/58    03/22/22 0840    134/69    03/22/22 0738 98.1 °F (36.7 °C) 70  (!) 115/53 98 %   03/22/22 0527     96 %     Pain level:4 to 7 out of 10  Pain location:right hip and femur  Pain interventions:Medication per order, rest, positioning,relaxation,       Patient education:,transfer training, gait training, balance training,fall precautions, body mechanics,activity pacing, energy conservation, Patient verbalizing understanding and demonstrating  understanding of patient education. Recommend follow up education.     Interdisciplinary Communication:spoke with OT, PA and RN regarding orthostatic hypotension    Other (comment) (Fall precautions, WBAT RLE)  GROSS ASSESSMENT Daily Assessment     NA       COGNITION Daily Assessment    Alert, able to follow commands,cooperating,participating, motivated,          BED/MAT MOBILITY Daily Assessment    Rolling Right : 0 (Not tested)  Rolling Left : 0 (Not tested)  Supine to Sit : 0 (Not tested)  Sit to Supine : 0 (Not tested)       TRANSFERS Daily Assessment   Increased time and effort to complete with cues for body mechanics   Transfer Type: SPT with walker  Transfer Assistance : 5 (Stand-by assistance)  Sit to Stand Assistance: Stand-by assistance  Car Transfers: Not tested       GAIT Daily Assessment   First attempt at gait patient had to sit down due to worsening c/o dizziness and feeling light headed. Patient assisted to sitting after 30 ft Amount of Assistance: 5 (Stand-by assistance)  Distance (ft): 30 Feet (ft) x 1  100ft x 1 ( with patient managing 02 line) making one 180 degree turn while managing 02 line. Assistive Device: Walker, rolling   Gait training with one time cue for managing 02 line and controlling gait speed with RR.    STEPS or STAIRS Daily Assessment    Steps/Stairs Ambulated (#): 0  Level of Assist : 0 (Not tested)       BALANCE Daily Assessment   No loss of balance during transfer or gait training Sitting - Static: Good (unsupported)  Sitting - Dynamic: Good (unsupported)  Standing - Static: Fair  Standing - Dynamic : Impaired       WHEELCHAIR MOBILITY Daily Assessment    Curbs/Ramps Assist Required (FIM Score): 0 (Not tested)  Wheelchair Setup Assist Required : 0 (Not tested)       LOWER EXTREMITY EXERCISES Daily Assessment   Increased time and effort to complete with multiple and frequent rest breaks. Cues for correct form   Extremity: Both  Exercise Type #1: Seated lower extremity strengthening  Sets Performed: 2  Reps Performed: 10  Level of Assist: Minimal assistance     SEATED EXERCISES Sets Reps Comments   Ankle Pumps 2 10    Hip Flexion 2 10    Long Arc Quads 2 10    Hip Adduction/Ball Squeeze 2 10    Hip Abduction with red Theraband 2 10    Hamstring Curls with red Theraband 2 10             Assessment: Decreased gait distance and gait independence due to orthostatic hypotension. Patient returned to room at end of treatment and remained up in recliner with LEs elevated and with needs in reach. 02 at 1 lpm    Plan of Care: Continue with POC and progress as tolerated.      Lida Buchanan, PT  3/22/2022

## 2022-03-22 NOTE — PROGRESS NOTES
Kelsey Real MD  Medical Director  [address] [facility], [address]  Tel: [phone] PROGRESS NOTE    [facility]  Admit Date: 3/18/2022  Admit Diagnosis:   SDH (subdural hematoma) ([facility] [address] [S06.5X9A]    Subjective     Patient seen and examined. Notes am chest pain which coincides with taking her medication. Relieved with Nitro within seconds. Cardiology following patient. Recent EKG with minimal change. Notes recurrent mild LE edema. Uses Torsemide and compression stockings at home. Currently on Prednisone for COPD. H/o compression fractures and hip fracture. Currently on no medication for osteoporosis. O2 in place via NC. No SOB. Normal B/B function.     Objective:     Current Facility-Administered Medications   Medication Dose Route Frequency    clopidogreL (PLAVIX) tablet 75 mg  75 mg Oral DAILY    azithromycin (ZITHROMAX) tablet 500 mg  500 mg Oral Q MON, WED & FRI    budesonide-formoteroL (SYMBICORT) 160-4.5 mcg/actuation HFA inhaler 2 Puff  2 Puff Inhalation BID RT    escitalopram oxalate (LEXAPRO) tablet 10 mg  10 mg Oral DAILY    ezetimibe (ZETIA) tablet 10 mg  10 mg Oral DAILY    gabapentin (NEURONTIN) capsule 100 mg  100 mg Oral TID    LORazepam (ATIVAN) tablet 0.5 mg  0.5 mg Oral Q8H PRN    methIMAzole (TAPAZOLE) tablet 10 mg  10 mg Oral DAILY    metoprolol succinate (TOPROL-XL) XL tablet 100 mg  100 mg Oral DAILY    nitroglycerin (NITROSTAT) tablet 0.4 mg  0.4 mg SubLINGual PRN    ondansetron (ZOFRAN ODT) tablet 4 mg  4 mg Oral Q6H PRN    oxyCODONE IR (ROXICODONE) tablet 5 mg  5 mg Oral Q4H PRN    pantoprazole (PROTONIX) tablet 40 mg  40 mg Oral ACB&D    polyethylene glycol (MIRALAX) packet 17 g  17 g Oral DAILY    predniSONE (DELTASONE) tablet 15 mg  15 mg Oral DAILY WITH BREAKFAST    tiotropium bromide (SPIRIVA RESPIMAT) 2.5 mcg /actuation  2 Puff Inhalation DAILY    traZODone (DESYREL) tablet 50 mg  50 mg Oral QHS PRN    acetaminophen (TYLENOL) tablet 650 mg  650 mg Oral Q4H PRN    atorvastatin (LIPITOR) tablet 80 mg  80 mg Oral QHS    bisacodyL (DULCOLAX) tablet 5 mg  5 mg Oral DAILY PRN    senna-docusate (PERICOLACE) 8.6-50 mg per tablet 2 Tablet  2 Tablet Oral QHS    guaiFENesin ER (MUCINEX) tablet 600 mg  600 mg Oral Q12H    aspirin chewable tablet 81 mg  81 mg Oral DAILY       Review of Systems:   Denies cough, visual problems, abdominal pain, dysuria, calf pain. Pertinent positives are as noted in the HPI, ROS unremarkable otherwise. Visit Vitals  BP (!) 111/57 (BP 1 Location: Left upper arm, BP Patient Position: Sitting)   Pulse 63   Temp 97.9 °F (36.6 °C)   Resp 18   SpO2 98%        Physical Exam:   General: Alert, oriented x 4. OOB in recliner. HEENT: Normocephalic but with mildly tender pecan-sized hematoma at right frontotemporal skull. Oral mucosa moist.   Lungs: CTA without R/R/R. Prolonged expiration, respirations unlabored. Heart: Regular rate and rhythm, normal S1, S2. No appreciable murmur. Abdomen: Soft, non-tender, not distended. Bowel sounds normoactive, no organomegaly. Genitourinary: Deferred. Neuromuscular:      Delayed processing (mild), speech clear, thoughts cogent. PERRLA, EOM intact, visual fields full to finger confrontation. No gross focal neuro deficits. UE strength at biceps, triceps and  4+/5 and symmetric bilaterally. LE strength at HF 4-/5 (R), 4/5 (L), distally 4+/5 and surprisingly symmetric s/p right IT fracture. Sensation intact distally. Skin/extremity: No rashes, no erythema. Calves soft, non-tender BLE. Multiple resolving ecchymoses to UE, R>>L. No 1+ edema.        Functional Assessment:  Balance  Sitting - Static: Good (unsupported) (03/22/22 1200)  Sitting - Dynamic: Good (unsupported) (03/22/22 1200)  Standing - Static: Fair (03/22/22 1200)  Standing - Dynamic : Impaired (03/22/22 1200)       Shukri Martin Fall Risk Assessment:  [place] Fall Risk  Mobility: Ambulates or transfers with assist devices or assistance (03/22/22 0758)  Mobility Interventions: Utilize walker, cane, or other assistive device (03/22/22 0758)  Mentation: Alert, oriented x 3 (03/22/22 0758)  Medication: Patient receiving anticonvulsants, sedatives(tranquilizers), psychotropics or hypnotics, hypoglycemics, narcotics, sleep aids, antihypertensives, laxatives, or diuretics (03/22/22 0758)  Medication Interventions: Patient to call before getting OOB; Teach patient to arise slowly (03/21/22 0732)  Elimination: Needs assistance with toileting (03/22/22 0758)  Elimination Interventions: Call light in reach (03/22/22 0758)  Prior Fall History: Before admission in past 12 months _home or previous inpatient care) (03/22/22 2519)  History of Falls Interventions: Door open when patient unattended (03/22/22 0758)  Total Score: 4 (03/22/22 0758)  Standard Fall Precautions: Yes (03/18/22 1340)  High Fall Risk: Yes (03/22/22 0758)     Ambulation:  Gait  Distance (ft): 100 Feet (ft) (100ft x 1, 30 ft x 1) (03/22/22 1200)  Assistive Device: Walker, rolling (03/22/22 1200)     Labs/Studies:  Recent Results (from the past 72 hour(s))   TROPONIN-HIGH SENSITIVITY    Collection Time: 03/20/22  5:40 PM   Result Value Ref Range    Troponin-High Sensitivity 31.1 (H) 0 - 14 pg/mL   EKG, 12 LEAD, INITIAL    Collection Time: 03/20/22  5:45 PM   Result Value Ref Range    Ventricular Rate 82 BPM    Atrial Rate 82 BPM    P-R Interval 152 ms    QRS Duration 70 ms    Q-T Interval 394 ms    QTC Calculation (Bezet) 460 ms    Calculated P Axis 88 degrees    Calculated R Axis 60 degrees    Calculated T Axis 110 degrees    Diagnosis       Normal sinus rhythm  T wave abnormality, consider lateral ischemia  When compared with ECG of 27-FEB-2022 10:03,  No significant change was found  Confirmed by Gardenia Garner MD, Alfredo Murillo ([id number]) on 3/20/2022 9:30:15 PM     TROPONIN-HIGH SENSITIVITY Collection Time: 03/21/22  2:05 AM   Result Value Ref Range    Troponin-High Sensitivity 32.6 (H) 0 - 14 pg/mL   HGB & HCT    Collection Time: 03/21/22  2:05 AM   Result Value Ref Range    HGB 8.3 (L) 11.7 - 15.4 g/dL    HCT 27.0 (L) 35.8 - 70.8 %   METABOLIC PANEL, COMPREHENSIVE    Collection Time: 03/21/22  2:05 AM   Result Value Ref Range    Sodium 140 136 - 145 mmol/L    Potassium 4.1 3.5 - 5.1 mmol/L    Chloride 105 98 - 107 mmol/L    CO2 29 21 - 32 mmol/L    Anion gap 6 (L) 7 - 16 mmol/L    Glucose 121 (H) 65 - 100 mg/dL    BUN 13 8 - 23 MG/DL    Creatinine 0.70 0.6 - 1.0 MG/DL    GFR est AA >60 >60 ml/min/1.73m2    GFR est non-AA >60 >60 ml/min/1.73m2    Calcium 8.9 8.3 - 10.4 MG/DL    Bilirubin, total 0.4 0.2 - 1.1 MG/DL    ALT (SGPT) 17 12 - 65 U/L    AST (SGOT) 11 (L) 15 - 37 U/L    Alk.  phosphatase 81 50 - 136 U/L    Protein, total 5.7 (L) 6.3 - 8.2 g/dL    Albumin 2.6 (L) 3.2 - 4.6 g/dL    Globulin 3.1 2.3 - 3.5 g/dL    A-G Ratio 0.8 (L) 1.2 - 3.5     NT-PRO BNP    Collection Time: 03/21/22  2:05 AM   Result Value Ref Range    NT pro-BNP 4,033 (H) 5 - 125 PG/ML       Assessment:     Problem List as of 3/22/2022 Date Reviewed: 3/21/2022          Codes Class Noted - Resolved    Chest pain ICD-10-CM: R07.9  ICD-9-CM: 786.50  5/31/2019 - Present        * (Principal) SDH (subdural hematoma) ([facility] ICD-10-CM: A74.1L5R  ICD-9-CM: 432.1  3/18/2022 - Present        Post-traumatic subdural hematoma ([facility] ICD-10-CM: U31.1E5D  ICD-9-CM: 852.20  3/15/2022 - Present        Hypokalemic alkalosis ICD-10-CM: E87.3  ICD-9-CM: 276.3  3/15/2022 - Present        EKG, abnormal ICD-10-CM: R94.31  ICD-9-CM: 794.31  2/15/2022 - Present        Femur fracture, right ([facility] ICD-10-CM: K24.87AS  ICD-9-CM: 821.00  2/11/2022 - Present        COPD with acute lower respiratory infection ([facility] ICD-10-CM: J44.0  ICD-9-CM: 496, 519.8  2/10/2022 - Present        Moderate to severe mitral regurgitation ICD-10-CM: I34.0  ICD-9-CM: 424.0 2/8/2022 - Present        Iron deficiency anemia due to chronic blood loss (Chronic) ICD-10-CM: D50.0  ICD-9-CM: 280.0  2/8/2022 - Present        Closed right hip fracture ([facility] [address] ICD-10-CM: S72.001A  ICD-9-CM: 820.8  2/4/2022 - Present        Other fracture of right femur, initial encounter for closed fracture ([facility] [address] ICD-10-CM: Y24.7K4J  ICD-9-CM: 821.00  2/4/2022 - Present        Multiple closed anterior-posterior compression fractures of pelvis ([facility] [address] ICD-10-CM: B57.50VA  ICD-9-CM: 808.44  2/2/2022 - Present        DDD (degenerative disc disease), lumbar ICD-10-CM: M51.36  ICD-9-CM: 722.52  2/2/2022 - Present        DNR (do not resuscitate) ICD-10-CM: Z66  ICD-9-CM: V49.86  11/9/2021 - Present    Overview Signed 11/9/2021 12:25 PM by Jeromy Hernandez NP     POST form completed - DNR but full treatment, including intubation. No TRACH. No PEG. Acute bilateral low back pain without sciatica ICD-10-CM: M54.50  ICD-9-CM: 724.2, 338.19  11/9/2021 - Present        Palliative care patient ICD-10-CM: Z51.5  ICD-9-CM: V66.7  7/14/2021 - Present        Depression ICD-10-CM: F32. A  ICD-9-CM: 168  5/6/2021 - Present        Anxiety ICD-10-CM: F41.9  ICD-9-CM: 300.00  5/6/2021 - Present        Ischemic heart disease, hx pci, cath/pci last 5/2019 ICD-10-CM: I25.9  ICD-9-CM: 414.9  2/24/2021 - Present        H/O carotid endarterectomy ICD-10-CM: Z98.890  ICD-9-CM: V45.89  9/21/2020 - Present        Right ear pain ICD-10-CM: H92.01  ICD-9-CM: 388.70  9/21/2020 - Present        Pulmonary mass ICD-10-CM: R91.8  ICD-9-CM: 786.6  2/11/2020 - Present        Centrilobular emphysema ([address] ICD-10-CM: J43.2  ICD-9-CM: 492.8  12/20/2019 - Present        Carotid stenosis ICD-10-CM: I65.29  ICD-9-CM: 433.10  12/3/2019 - Present        Carotid stenosis, right ICD-10-CM: I65.21  ICD-9-CM: 433.10  12/3/2019 - Present        Hypertensive urgency ICD-10-CM: I16.0  ICD-9-CM: 401.9  6/8/2019 - Present        Chronic respiratory failure with hypoxia ([facility] [address] (Chronic) ICD-10-CM: J96.11  ICD-9-CM: 518.83, 799.02  6/8/2019 - Present        CAD S/P percutaneous coronary angioplasty ICD-10-CM: I25.10, Z98.61  ICD-9-CM: 414.01, V45.82  5/31/2019 - Present        Ventricular ectopy ICD-10-CM: I49.3  ICD-9-CM: 427.69  5/9/2017 - Present        Chronic anxiety ICD-10-CM: F41.9  ICD-9-CM: 300.00  4/27/2017 - Present        Hyperlipidemia (Chronic) ICD-10-CM: E78.5  ICD-9-CM: 272.4  10/10/2016 - Present        Essential hypertension, benign (Chronic) ICD-10-CM: I10  ICD-9-CM: 401.1  10/10/2016 - Present        Coronary artery disease involving native coronary artery of native heart without angina pectoris ICD-10-CM: I25.10  ICD-9-CM: 414.01  10/10/2016 - Present        Hypoxemia ICD-10-CM: R09.02  ICD-9-CM: 799.02  8/25/2013 - Present    Overview Signed 2/5/2014  9:08 AM by Clayton Morales NP     FILIBERTO 8/2013. Pt had over 2 hours 1/2 hours of desaturations at night. O2 was ordered for her to start at 2L in September, but she stated she was feeling better than when FILIBERTO was done and refused O2. FILIBERTO 12/2013: not performed secondary to patient factors.               Acute respiratory failure with hypoxia (HCC) ICD-10-CM: J96.01  ICD-9-CM: 518.81  8/22/2013 - Present        COPD, very severe (HCC) (Chronic) ICD-10-CM: J44.9  ICD-9-CM: 496  8/20/2013 - Present        Personal history of tobacco use ICD-10-CM: C50.850  ICD-9-CM: V15.82  8/19/2013 - Present        History of MI (myocardial infarction) ICD-10-CM: I25.2  ICD-9-CM: 033  8/19/2013 - Present    Overview Addendum 8/30/2018  9:20 AM by Charbel Wild MD     STEMI 8/19/13:  Angioplasty for in-stent stenosis to LAD             RESOLVED: Chronic obstructive pulmonary disease ([facility] ICD-10-CM: J44.9  ICD-9-CM: 496  10/10/2016 - 8/30/2018        RESOLVED: Acute systolic heart failure ([facility] ICD-10-CM: I50.21  ICD-9-CM: 428.21  8/22/2013 - 8/30/2018        RESOLVED: Pulmonary hemorrhage ICD-10-CM: R04.89  ICD-9-CM: 786.30  8/20/2013 - 6/25/2019        RESOLVED: CAD (coronary artery disease) (Chronic) ICD-10-CM: I25.10  ICD-9-CM: 414.00  8/19/2013 - 8/3/2021        RESOLVED: COPD (chronic obstructive pulmonary disease) (HCC) (Chronic) ICD-10-CM: J44.9  ICD-9-CM: 496  8/19/2013 - 8/20/2013            S/p traumatic subdural hematoma     Plan / Recommendations / Medical Decision Making:     Continue daily physician / PA medical management:    Subdural hematoma - PT/OT; good rehab potential.     S/p right IT fracture 2/2022 now s/p fall with fx of the lesser trochanter; WBAT. No surgery required, per Dr Bola Urbina.     Hyperlipidemia - continue Lipitor and Zetia.     COPD - O2- and steroid-dependent, 1-2 L O2. Continue Symbicort, Spiriva and prednisone 15mg. Encourage IS. She is on prophylactic azithromycin 500mg MWF for inflammation due to COPD / emphysema. There was concern during hospitalization 2/2022 of pulmonary malignancy with a 2.8cm left perihilar lower lobe spiculated mass per CT chest 2/26/2022.  -3/22. Stable on 0.5-2.0 L O2 via NC. Lungs clear.     Hx of MI - s/p stent to LAD and distal RCA 2/7/2022; continue statin, was on Effient at home. Plans to restart ASA and begin Plavix; will wait 2wks post SDH; this was a very small bleed. ASA 81mg restarted on [facility] admission; monitor clinically. Seen by Dr Alyse Blevins 3/16.  -3/21 per CardiologyValorie I would like to start back dual antiplatelet therapy (using aspirin and clopidogrel) in 3 to 4 days if possible\"; per Neurosurgery, okay to start Plavix     Hypertension - BP fluctuating, managed medically. Continue Toprol XL, PRN SL NTG.  -3/20 HR 72, /74  -3/21 /53 this AM then 168/76 with HA, CP; HR consistently in 70s; monitor during therapy  -3/22. BP fluctuating. /. No dizziness or orthostasis. Increasing LE edema. Resume Torsemide 10 mg PO every day.  Compression garments.      Anemia - Hgb 9.3 and stable.  -3/21 Hgb 8.3, drifting down; recheck in 2d  -3/22. Check CBC on 3/25. Start B Complex.     Hypokalemia - resolved; monitor and replace if needed. -3/21 K+ 4.1 today. CHF. BNP markedly elevated at 4033.  -3/22. Resume Torsemide 10 mg PO every day. -Check BMP/BNP on 3/25. Osteoporosis. Start Calcium/Vitamin D every day. Consider the addition of medication as an outpatient in addition to resistance training and vibration plate therapy. Pain management - fairly recent right hip fracture. Will require regular pain assessment and comprehensive pain management. PRN oxycodone and APAP; history of T12-L1 compression fractures.     Pneumonia prophylaxis - incentive spirometer every hour while awake.     DVT risk / DVT prophylaxis - daily physician / PA exam to assess as patient is at increased risk for of thromboembolism. Mobilize as tolerated. Sequential pneumatic compression devices (SCDs) when in bed; thigh-high or knee-high thromboembolic deterrent hose when out of bed. No anticoagulation due to SDH.  -3/21 on ASA since 3/19; okay to start Plavix per NSG     GERD - continue PPI BID. At times may need additional antacids, Maalox prn. Depression - continue on Lexapro. At risk of depression exacerbation due to re-hospitalization and decline in functional mobility and independence. Resume PRN Ativan for anxiety.     General skin care / wound prevention - monitor general skin status daily per staff and physician / PA. At risk for failure due to impaired mobility. Patient with multiple bruises and friable skin.     Bladder program - voiding without difficulty.     Bowel program - at risk for constipation as a side effect of opioids, other medications, impaired mobility, etc. MiraLAX daily for regularity, Marisela-Colace for stool softener. PRN MOM, bisacodyl suppository or tablets for constipation.     Hyperthyroidism - continue Tapazole.           Time spent was 35 minutes with over 1/2 in direct patient care/examination, consultation and coordination of care. Signed By: Michelle Segura MD    March 22, 2022      Physician Assistant with [facility]  Anh Carpenter MD, Medical Director

## 2022-03-23 PROCEDURE — 74011636637 HC RX REV CODE- 636/637: Performed by: PHYSICAL MEDICINE & REHABILITATION

## 2022-03-23 PROCEDURE — 3331090001 HH PPS REVENUE CREDIT

## 2022-03-23 PROCEDURE — 3331090002 HH PPS REVENUE DEBIT

## 2022-03-23 PROCEDURE — 97116 GAIT TRAINING THERAPY: CPT

## 2022-03-23 PROCEDURE — 97110 THERAPEUTIC EXERCISES: CPT

## 2022-03-23 PROCEDURE — 94664 DEMO&/EVAL PT USE INHALER: CPT

## 2022-03-23 PROCEDURE — 97535 SELF CARE MNGMENT TRAINING: CPT

## 2022-03-23 PROCEDURE — 94640 AIRWAY INHALATION TREATMENT: CPT

## 2022-03-23 PROCEDURE — 65310000000 HC RM PRIVATE REHAB

## 2022-03-23 PROCEDURE — 74011250637 HC RX REV CODE- 250/637: Performed by: PHYSICAL MEDICINE & REHABILITATION

## 2022-03-23 PROCEDURE — 2709999900 HC NON-CHARGEABLE SUPPLY

## 2022-03-23 PROCEDURE — 77010033678 HC OXYGEN DAILY

## 2022-03-23 PROCEDURE — 97530 THERAPEUTIC ACTIVITIES: CPT

## 2022-03-23 PROCEDURE — 94760 N-INVAS EAR/PLS OXIMETRY 1: CPT

## 2022-03-23 PROCEDURE — 97150 GROUP THERAPEUTIC PROCEDURES: CPT

## 2022-03-23 PROCEDURE — 74011250637 HC RX REV CODE- 250/637: Performed by: PHYSICIAN ASSISTANT

## 2022-03-23 RX ADMIN — ACETAMINOPHEN 650 MG: 325 TABLET ORAL at 06:52

## 2022-03-23 RX ADMIN — BUDESONIDE AND FORMOTEROL FUMARATE DIHYDRATE 2 PUFF: 160; 4.5 AEROSOL RESPIRATORY (INHALATION) at 08:00

## 2022-03-23 RX ADMIN — TORSEMIDE 10 MG: 20 TABLET ORAL at 09:02

## 2022-03-23 RX ADMIN — ACETAMINOPHEN 650 MG: 325 TABLET ORAL at 13:13

## 2022-03-23 RX ADMIN — TIOTROPIUM BROMIDE INHALATION SPRAY 2 PUFF: 3.12 SPRAY, METERED RESPIRATORY (INHALATION) at 09:00

## 2022-03-23 RX ADMIN — LORAZEPAM 0.5 MG: 0.5 TABLET ORAL at 06:52

## 2022-03-23 RX ADMIN — METHIMAZOLE 10 MG: 5 TABLET ORAL at 09:01

## 2022-03-23 RX ADMIN — GABAPENTIN 100 MG: 100 CAPSULE ORAL at 17:35

## 2022-03-23 RX ADMIN — EZETIMIBE 10 MG: 10 TABLET ORAL at 09:05

## 2022-03-23 RX ADMIN — CLOPIDOGREL BISULFATE 75 MG: 75 TABLET ORAL at 09:05

## 2022-03-23 RX ADMIN — OXYCODONE HYDROCHLORIDE 5 MG: 5 TABLET ORAL at 17:35

## 2022-03-23 RX ADMIN — ZINC 1 TABLET: TAB ORAL at 09:05

## 2022-03-23 RX ADMIN — PANTOPRAZOLE SODIUM 40 MG: 40 TABLET, DELAYED RELEASE ORAL at 17:35

## 2022-03-23 RX ADMIN — OXYCODONE HYDROCHLORIDE 5 MG: 5 TABLET ORAL at 10:24

## 2022-03-23 RX ADMIN — GUAIFENESIN 600 MG: 600 TABLET ORAL at 21:06

## 2022-03-23 RX ADMIN — GABAPENTIN 100 MG: 100 CAPSULE ORAL at 09:04

## 2022-03-23 RX ADMIN — PREDNISONE 15 MG: 10 TABLET ORAL at 09:02

## 2022-03-23 RX ADMIN — ACETAMINOPHEN 650 MG: 325 TABLET ORAL at 17:47

## 2022-03-23 RX ADMIN — AZITHROMYCIN MONOHYDRATE 500 MG: 250 TABLET ORAL at 21:06

## 2022-03-23 RX ADMIN — CALCIUM CARBONATE-CHOLECALCIFEROL TAB 250 MG-125 UNIT 1 TABLET: 250-125 TAB at 09:05

## 2022-03-23 RX ADMIN — LORAZEPAM 0.5 MG: 0.5 TABLET ORAL at 21:12

## 2022-03-23 RX ADMIN — ESCITALOPRAM OXALATE 10 MG: 10 TABLET ORAL at 09:04

## 2022-03-23 RX ADMIN — PANTOPRAZOLE SODIUM 40 MG: 40 TABLET, DELAYED RELEASE ORAL at 06:33

## 2022-03-23 RX ADMIN — METOPROLOL SUCCINATE 100 MG: 50 TABLET, EXTENDED RELEASE ORAL at 10:03

## 2022-03-23 RX ADMIN — BUDESONIDE AND FORMOTEROL FUMARATE DIHYDRATE 2 PUFF: 160; 4.5 AEROSOL RESPIRATORY (INHALATION) at 19:11

## 2022-03-23 RX ADMIN — GABAPENTIN 100 MG: 100 CAPSULE ORAL at 21:06

## 2022-03-23 RX ADMIN — ATORVASTATIN CALCIUM 80 MG: 80 TABLET, FILM COATED ORAL at 21:06

## 2022-03-23 RX ADMIN — ASPIRIN 81 MG: 81 TABLET, CHEWABLE ORAL at 09:05

## 2022-03-23 RX ADMIN — GUAIFENESIN 600 MG: 600 TABLET ORAL at 09:01

## 2022-03-23 RX ADMIN — OXYCODONE HYDROCHLORIDE 5 MG: 5 TABLET ORAL at 06:33

## 2022-03-23 RX ADMIN — OXYCODONE HYDROCHLORIDE 5 MG: 5 TABLET ORAL at 21:10

## 2022-03-23 NOTE — PROGRESS NOTES
OT Daily Note  Time In 1350   Time Out 1430     Subjective: \"As long as we do some standing. \" Pt agreeable to treatment. Pain: No pain expressed  Interdisciplinary Communication: Team Conference  Precautions: Falls, WBAT RLE     Patient Vitals for the past 8 hrs:   Temp Pulse Resp BP SpO2   03/23/22 1608 98.5 °F (36.9 °C) 77 18 (!) 115/51 93 %   03/23/22 1003  71  (!) 148/56    03/23/22 1000     98 %   03/23/22 0955  71  (!) 148/56 100 %   03/23/22 0925  75  (!) 169/72 99 %         Functional Mobility    Score Comments   Sit to Stand 4: Supervision or touching A SBA   Transfer Assist 4: Supervision or touching A Transfer Type: SPT   Equipment: Rolling Walker   Comments: SBA         Activity Tolerance and Strengthening   Pt practiced activity tolerance activity on room air, practiced self-pacing/rest breaks and self-monitoring SpO2 throughout. Min cues for pacing. Pt completed 12 minutes on the ergometer, 1/2 forwards and 1/2 backwards, with mild resistance to increase UB strength and activity tolerance for integration into functional tasks. SpO2 88-93% on room air. Activity Tolerance and Balance   Pt participated in game of [employer] in standing to address standing tolerance, standing balance, posture, and overall activity tolerance. Pt completed activity on room air with SpO2 stable >90% throughout. Pt able to stand for 8 minutes 21 seconds. Education   Activity pacing and Breathing technique     Assessment: Discussed attempting to wean pt off of supplemental O2 during the day as tolerated with care team at team conference. Patient agreeable, reports she had only worn it at night until 2 months ago. Reports she is in the hospital this admission for SDH due to tripping over O2 NC. Pt demonstrated good participation in OT treatment. Pt continues to benefit from skilled OT services to address remaining deficits and return back to baseline with improved independence and safety during I/ADLs.  Patient ended session seated in wheelchair with PT. Plan: Continue OT POC.      Wyatt Padgett OTR/MARIUSZ   3/23/2022

## 2022-03-23 NOTE — PROGRESS NOTES
PHYSICAL THERAPY DAILY NOTE  Time In: 1429  Time Out: 1911  Patient Seen For: PM;Transfer training; Therapeutic exercise;Gait training    Subjective: \"I hope I can wean myself from using the O2 during the day all of the time\"         Objective: Vital Signs:   Visit Vitals  BP (!) 148/56   Pulse 71   Temp 97.8 °F (36.6 °C)   Resp 16   Wt 62.3 kg (137 lb 6.4 oz)   SpO2 98%   BMI 20.89 kg/m²     Pain level: Patient only had minimal complaints of pain during therapy this afternoon. Other (comment) (Fall precautions, WBAT RLE)    COGNITION Daily Assessment    Same as previous note     TRANSFERS Daily Assessment   Steady and controlled without O2  And her sats staying in the low 90s with transition movements Transfer Type: SPT with walker  Other: toilet transfers with SBA  Transfer Assistance : 5 (Stand-by assistance)  Sit to Stand Assistance: Stand-by assistance  Car Transfers: Not tested     GAIT Daily Assessment   Practiced short distance gait without O2 and her sats dropped to 87% with a quick return to 91% once seated and rested. Amount of Assistance: 5 (Stand-by assistance)  Distance (ft): 30 Feet (ft) (30' without O2)  Assistive Device: Walker, rolling     BALANCE Daily Assessment    Sitting - Static: Good (unsupported)  Sitting - Dynamic: Good (unsupported)  Standing - Static: Good  Standing - Dynamic : Impaired     LOWER EXTREMITY EXERCISES Daily Assessment    Extremity: Both  Exercise Type #1: Seated lower extremity strengthening (1.5# used )  Sets Performed: 2  Reps Performed: 10  Level of Assist: Supervision  Exercise Type #2: Standing lower extremity strengthening (Standing time at the standing frame and bathroom sink)  Level of Assist: Supervision          Assessment: Patient participated and tolerated therapy well again this afternoon. We spent time off O2 practicing short distance gait and activities with her O2 sats fluctuating between 88% and 94% depending on the task.   She had to use the toilet for a BM and only required SBA but her O2 sats dropped after toileting to 84% and she had to be seated and allowed time to recover. Patient was taken back to her room via the w/c and was assisted to the toilet with a RW and SBA. She was then assisted back to her bedside chair with SBA using the RW and left with her call light and personal items in reach. Plan of Care: Continue to treat and progress towards her goals.       Ayanna Bazan, [place]  6/62/0803

## 2022-03-23 NOTE — PROGRESS NOTES
Team conference today with discharge set for Thursday, 3/31. BSN, CM met with pt at bedside and updated on d/c date. HH needs include PT/OT and pt would like to resume with [facility]. Therapy will set family training with spouse. No DME needs as pt already has all needed DME at home. Will update spouse. CM to continue to follow and monitor for any further needs.

## 2022-03-23 NOTE — PROGRESS NOTES
Peña Casey MD  Medical Director  [address] [facility], [address] [facility] [facility]  Tel: [phone] PROGRESS NOTE    [facility]  Admit Date: 3/18/2022  Admit Diagnosis:   SDH (subdural hematoma) ([facility] [address] [S06.5X9A]    Subjective     Patient seen and examined. No CP/SOB today. Edema resolved with addition of the Torsemide. Currently on Prednisone for COPD. Progesterone not available on formulary. H/o compression fractures and hip fracture. Currently on no medication for osteoporosis. O2 in place via NC. Normal B/B function.     Objective:     Current Facility-Administered Medications   Medication Dose Route Frequency    multivitamin w ZN (STRESSTABS W ZINC) tablet  1 Tablet Oral DAILY    calcium-vitamin D (OS-DOMINGO +D3) 250 mg-125 unit per tablet 1 Tablet  1 Tablet Oral DAILY    torsemide (DEMADEX) tablet 10 mg  10 mg Oral DAILY    clopidogreL (PLAVIX) tablet 75 mg  75 mg Oral DAILY    azithromycin (ZITHROMAX) tablet 500 mg  500 mg Oral Q MON, WED & FRI    budesonide-formoteroL (SYMBICORT) 160-4.5 mcg/actuation HFA inhaler 2 Puff  2 Puff Inhalation BID RT    escitalopram oxalate (LEXAPRO) tablet 10 mg  10 mg Oral DAILY    ezetimibe (ZETIA) tablet 10 mg  10 mg Oral DAILY    gabapentin (NEURONTIN) capsule 100 mg  100 mg Oral TID    LORazepam (ATIVAN) tablet 0.5 mg  0.5 mg Oral Q8H PRN    methIMAzole (TAPAZOLE) tablet 10 mg  10 mg Oral DAILY    metoprolol succinate (TOPROL-XL) XL tablet 100 mg  100 mg Oral DAILY    nitroglycerin (NITROSTAT) tablet 0.4 mg  0.4 mg SubLINGual PRN    ondansetron (ZOFRAN ODT) tablet 4 mg  4 mg Oral Q6H PRN    oxyCODONE IR (ROXICODONE) tablet 5 mg  5 mg Oral Q4H PRN    pantoprazole (PROTONIX) tablet 40 mg  40 mg Oral ACB&D    polyethylene glycol (MIRALAX) packet 17 g  17 g Oral DAILY    predniSONE (DELTASONE) tablet 15 mg  15 mg Oral DAILY WITH BREAKFAST    tiotropium bromide (SPIRIVA RESPIMAT) 2.5 mcg /actuation  2 Puff Inhalation DAILY    traZODone (DESYREL) tablet 50 mg  50 mg Oral QHS PRN    acetaminophen (TYLENOL) tablet 650 mg  650 mg Oral Q4H PRN    atorvastatin (LIPITOR) tablet 80 mg  80 mg Oral QHS    bisacodyL (DULCOLAX) tablet 5 mg  5 mg Oral DAILY PRN    senna-docusate (PERICOLACE) 8.6-50 mg per tablet 2 Tablet  2 Tablet Oral QHS    guaiFENesin ER (MUCINEX) tablet 600 mg  600 mg Oral Q12H    aspirin chewable tablet 81 mg  81 mg Oral DAILY       Review of Systems:   Denies cough, visual problems, abdominal pain, dysuria, calf pain. Pertinent positives are as noted in the HPI, ROS unremarkable otherwise. Visit Vitals  BP (!) 148/56   Pulse 71   Temp 97.8 °F (36.6 °C)   Resp 16   Wt 62.3 kg (137 lb 6.4 oz)   SpO2 97%   BMI 20.89 kg/m²        Physical Exam:   General: Alert, oriented x 4. OOB and ambulating today with PT. HEENT: Normocephalic but with mildly tender pecan-sized hematoma at right frontotemporal skull. Improved in overall appearnce. Oral mucosa moist.   Lungs: CTA without R/R/R. Respirations unlabored. Heart: Regular rate and rhythm, normal S1, S2. No appreciable murmur. Abdomen: Soft, non-tender, not distended. Bowel sounds normoactive, no organomegaly. Genitourinary: Deferred. Neuromuscular:        PERRLA, EOM intact, visual fields full to finger confrontation. No gross focal neuro deficits. UE strength at biceps, triceps and  4+/5 and symmetric bilaterally. LE strength at HF 4-/5 (R), 4/5 (L), distally 4+/5 and symmetric s/p right IT fracture. Sensation intact distally. Skin/extremity: No rashes, no erythema. Calves soft, non-tender BLE. Multiple resolving ecchymoses to UE, R>>L. No edema today.        Functional Assessment:  Balance  Sitting - Static: Good (unsupported) (03/22/22 1700)  Sitting - Dynamic: Good (unsupported) (03/22/22 1700)  Standing - Static: Fair (03/22/22 1700)  Standing - Dynamic : Impaired (03/22/22 1700) [facility] Fall Risk Assessment:  Joselyn Carrion Fall Risk  Mobility: Ambulates or transfers with assist devices or assistance (03/23/22 0746)  Mobility Interventions: Bed/chair exit alarm (03/23/22 0427)  Mentation: Alert, oriented x 3 (03/23/22 0427)  Medication: Patient receiving anticonvulsants, sedatives(tranquilizers), psychotropics or hypnotics, hypoglycemics, narcotics, sleep aids, antihypertensives, laxatives, or diuretics (03/23/22 0427)  Medication Interventions: Teach patient to arise slowly (03/23/22 0427)  Elimination: Needs assistance with toileting (03/23/22 0427)  Elimination Interventions: Call light in reach (03/23/22 0427)  Prior Fall History: Before admission in past 12 months _home or previous inpatient care) (03/23/22 0427)  History of Falls Interventions: Bed/chair exit alarm (03/23/22 0427)  Total Score: 4 (03/23/22 0427)  Standard Fall Precautions: Yes (03/23/22 0427)  High Fall Risk: Yes (03/23/22 0427)     Ambulation:  Gait  Distance (ft): 100 Feet (ft) (100ft x 1  80 ft x 3 with 180 degree turn after 40 ft) (03/22/22 1700)  Assistive Device: Walker, rolling (03/22/22 1700)  Rail Use:  (with RW) (03/22/22 1700)     Labs/Studies:  Recent Results (from the past 72 hour(s))   TROPONIN-HIGH SENSITIVITY    Collection Time: 03/20/22  5:40 PM   Result Value Ref Range    Troponin-High Sensitivity 31.1 (H) 0 - 14 pg/mL   EKG, 12 LEAD, INITIAL    Collection Time: 03/20/22  5:45 PM   Result Value Ref Range    Ventricular Rate 82 BPM    Atrial Rate 82 BPM    P-R Interval 152 ms    QRS Duration 70 ms    Q-T Interval 394 ms    QTC Calculation (Bezet) 460 ms    Calculated P Axis 88 degrees    Calculated R Axis 60 degrees    Calculated T Axis 110 degrees    Diagnosis       Normal sinus rhythm  T wave abnormality, consider lateral ischemia  When compared with ECG of 27-FEB-2022 10:03,  No significant change was found  Confirmed by Renae Villeda MD,  Pluck ([id number]) on 3/20/2022 9:30:15 PM     TROPONIN-HIGH SENSITIVITY Collection Time: 03/21/22  2:05 AM   Result Value Ref Range    Troponin-High Sensitivity 32.6 (H) 0 - 14 pg/mL   HGB & HCT    Collection Time: 03/21/22  2:05 AM   Result Value Ref Range    HGB 8.3 (L) 11.7 - 15.4 g/dL    HCT 27.0 (L) 35.8 - 98.9 %   METABOLIC PANEL, COMPREHENSIVE    Collection Time: 03/21/22  2:05 AM   Result Value Ref Range    Sodium 140 136 - 145 mmol/L    Potassium 4.1 3.5 - 5.1 mmol/L    Chloride 105 98 - 107 mmol/L    CO2 29 21 - 32 mmol/L    Anion gap 6 (L) 7 - 16 mmol/L    Glucose 121 (H) 65 - 100 mg/dL    BUN 13 8 - 23 MG/DL    Creatinine 0.70 0.6 - 1.0 MG/DL    GFR est AA >60 >60 ml/min/1.73m2    GFR est non-AA >60 >60 ml/min/1.73m2    Calcium 8.9 8.3 - 10.4 MG/DL    Bilirubin, total 0.4 0.2 - 1.1 MG/DL    ALT (SGPT) 17 12 - 65 U/L    AST (SGOT) 11 (L) 15 - 37 U/L    Alk.  phosphatase 81 50 - 136 U/L    Protein, total 5.7 (L) 6.3 - 8.2 g/dL    Albumin 2.6 (L) 3.2 - 4.6 g/dL    Globulin 3.1 2.3 - 3.5 g/dL    A-G Ratio 0.8 (L) 1.2 - 3.5     NT-PRO BNP    Collection Time: 03/21/22  2:05 AM   Result Value Ref Range    NT pro-BNP 4,033 (H) 5 - 125 PG/ML       Assessment:     Problem List as of 3/23/2022 Date Reviewed: 3/21/2022          Codes Class Noted - Resolved    Chest pain ICD-10-CM: R07.9  ICD-9-CM: 786.50  5/31/2019 - Present        * (Principal) SDH (subdural hematoma) ([facility] ICD-10-CM: T03.3V1N  ICD-9-CM: 432.1  3/18/2022 - Present        Post-traumatic subdural hematoma ([facility] ICD-10-CM: M10.1Z6W  ICD-9-CM: 852.20  3/15/2022 - Present        Hypokalemic alkalosis ICD-10-CM: E87.3  ICD-9-CM: 276.3  3/15/2022 - Present        EKG, abnormal ICD-10-CM: R94.31  ICD-9-CM: 794.31  2/15/2022 - Present        Femur fracture, right ([facility] ICD-10-CM: C94.95XJ  ICD-9-CM: 821.00  2/11/2022 - Present        COPD with acute lower respiratory infection ([facility] ICD-10-CM: J44.0  ICD-9-CM: 496, 519.8  2/10/2022 - Present        Moderate to severe mitral regurgitation ICD-10-CM: I34.0  ICD-9-CM: 424.0 2/8/2022 - Present        Iron deficiency anemia due to chronic blood loss (Chronic) ICD-10-CM: D50.0  ICD-9-CM: 280.0  2/8/2022 - Present        Closed right hip fracture ([facility] [address] ICD-10-CM: S72.001A  ICD-9-CM: 820.8  2/4/2022 - Present        Other fracture of right femur, initial encounter for closed fracture ([facility] [address] ICD-10-CM: L51.1F0P  ICD-9-CM: 821.00  2/4/2022 - Present        Multiple closed anterior-posterior compression fractures of pelvis ([facility] [address] ICD-10-CM: U98.12OV  ICD-9-CM: 808.44  2/2/2022 - Present        DDD (degenerative disc disease), lumbar ICD-10-CM: M51.36  ICD-9-CM: 722.52  2/2/2022 - Present        DNR (do not resuscitate) ICD-10-CM: Z66  ICD-9-CM: V49.86  11/9/2021 - Present    Overview Signed 11/9/2021 12:25 PM by Nancy Urrutia NP     POST form completed - DNR but full treatment, including intubation. No TRACH. No PEG. Acute bilateral low back pain without sciatica ICD-10-CM: M54.50  ICD-9-CM: 724.2, 338.19  11/9/2021 - Present        Palliative care patient ICD-10-CM: Z51.5  ICD-9-CM: V66.7  7/14/2021 - Present        Depression ICD-10-CM: F32. A  ICD-9-CM: 617  5/6/2021 - Present        Anxiety ICD-10-CM: F41.9  ICD-9-CM: 300.00  5/6/2021 - Present        Ischemic heart disease, hx pci, cath/pci last 5/2019 ICD-10-CM: I25.9  ICD-9-CM: 414.9  2/24/2021 - Present        H/O carotid endarterectomy ICD-10-CM: Z98.890  ICD-9-CM: V45.89  9/21/2020 - Present        Right ear pain ICD-10-CM: H92.01  ICD-9-CM: 388.70  9/21/2020 - Present        Pulmonary mass ICD-10-CM: R91.8  ICD-9-CM: 786.6  2/11/2020 - Present        Centrilobular emphysema ([address] ICD-10-CM: J43.2  ICD-9-CM: 492.8  12/20/2019 - Present        Carotid stenosis ICD-10-CM: I65.29  ICD-9-CM: 433.10  12/3/2019 - Present        Carotid stenosis, right ICD-10-CM: I65.21  ICD-9-CM: 433.10  12/3/2019 - Present        Hypertensive urgency ICD-10-CM: I16.0  ICD-9-CM: 401.9  6/8/2019 - Present        Chronic respiratory failure with hypoxia ([facility] [address] (Chronic) ICD-10-CM: J96.11  ICD-9-CM: 518.83, 799.02  6/8/2019 - Present        CAD S/P percutaneous coronary angioplasty ICD-10-CM: I25.10, Z98.61  ICD-9-CM: 414.01, V45.82  5/31/2019 - Present        Ventricular ectopy ICD-10-CM: I49.3  ICD-9-CM: 427.69  5/9/2017 - Present        Chronic anxiety ICD-10-CM: F41.9  ICD-9-CM: 300.00  4/27/2017 - Present        Hyperlipidemia (Chronic) ICD-10-CM: E78.5  ICD-9-CM: 272.4  10/10/2016 - Present        Essential hypertension, benign (Chronic) ICD-10-CM: I10  ICD-9-CM: 401.1  10/10/2016 - Present        Coronary artery disease involving native coronary artery of native heart without angina pectoris ICD-10-CM: I25.10  ICD-9-CM: 414.01  10/10/2016 - Present        Hypoxemia ICD-10-CM: R09.02  ICD-9-CM: 799.02  8/25/2013 - Present    Overview Signed 2/5/2014  9:08 AM by Александр Gallegos NP     FILIBERTO 8/2013. Pt had over 2 hours 1/2 hours of desaturations at night. O2 was ordered for her to start at 2L in September, but she stated she was feeling better than when FILIBERTO was done and refused O2. FILIBERTO 12/2013: not performed secondary to patient factors.               Acute respiratory failure with hypoxia (HCC) ICD-10-CM: J96.01  ICD-9-CM: 518.81  8/22/2013 - Present        COPD, very severe (HCC) (Chronic) ICD-10-CM: J44.9  ICD-9-CM: 496  8/20/2013 - Present        Personal history of tobacco use ICD-10-CM: X35.314  ICD-9-CM: V15.82  8/19/2013 - Present        History of MI (myocardial infarction) ICD-10-CM: I25.2  ICD-9-CM: 012  8/19/2013 - Present    Overview Addendum 8/30/2018  9:20 AM by Anoop Schulz MD     STEMI 8/19/13:  Angioplasty for in-stent stenosis to LAD             RESOLVED: Chronic obstructive pulmonary disease ([facility] ICD-10-CM: J44.9  ICD-9-CM: 496  10/10/2016 - 8/30/2018        RESOLVED: Acute systolic heart failure ([facility] ICD-10-CM: I50.21  ICD-9-CM: 428.21  8/22/2013 - 8/30/2018        RESOLVED: Pulmonary hemorrhage ICD-10-CM: R04.89  ICD-9-CM: 786.30  8/20/2013 - 6/25/2019        RESOLVED: CAD (coronary artery disease) (Chronic) ICD-10-CM: I25.10  ICD-9-CM: 414.00  8/19/2013 - 8/3/2021        RESOLVED: COPD (chronic obstructive pulmonary disease) (HCC) (Chronic) ICD-10-CM: J44.9  ICD-9-CM: 496  8/19/2013 - 8/20/2013            S/p traumatic subdural hematoma     Plan / Recommendations / Medical Decision Making:     Continue daily physician / PA medical management:    Subdural hematoma - PT/OT; good rehab potential.     S/p right IT fracture 2/2022 now s/p fall with fx of the lesser trochanter; WBAT. No surgery required, per Dr Marikay Denver.     Hyperlipidemia - continue Lipitor and Zetia.     COPD - O2- and steroid-dependent, 1-2 L O2. Continue Symbicort, Spiriva and prednisone 15mg. Encourage IS. She is on prophylactic azithromycin 500mg MWF for inflammation due to COPD / emphysema. There was concern during hospitalization 2/2022 of pulmonary malignancy with a 2.8cm left perihilar lower lobe spiculated mass per CT chest 2/26/2022.  -3/22. Stable on 0.5-2.0 L O2 via NC. Lungs clear.  -3/23. Sats. 95-98% on 2 L O2 via NC.     Hx of MI - s/p stent to LAD and distal RCA 2/7/2022; continue statin, was on Effient at home. Plans to restart ASA and begin Plavix; will wait 2wks post SDH; this was a very small bleed. ASA 81mg restarted on [facility] admission; monitor clinically. Seen by Dr Samuel Peters 3/16.  -3/21 per Cardiology, Alvina Cook I would like to start back dual antiplatelet therapy (using aspirin and clopidogrel) in 3 to 4 days if possible\"; per Neurosurgery, okay to start Plavix     Hypertension - BP fluctuating, managed medically. Continue Toprol XL, PRN SL NTG.  -3/20 HR 72, /74  -3/21 /53 this AM then 168/76 with HA, CP; HR consistently in 70s; monitor during therapy  -3/22. BP fluctuating. /. No dizziness or orthostasis. Increasing LE edema. Resume Torsemide 10 mg PO every day. Compression garments.   -3/23.  BP stable a t 114-148/50-56. Toprol XL held this am. Continue to monitor. Continue Torsemide and compression garments.     Anemia - Hgb 9.3 and stable.  -3/21 Hgb 8.3, drifting down; recheck in 2d  -3/22. Check CBC on 3/25. Start B Complex.     Hypokalemia - resolved; monitor and replace if needed. -3/21 K+ 4.1 today.  -3/25. BMP/BNP scheduled for 3/25. CHF. BNP markedly elevated at 4033.  -3/22. Resume Torsemide 10 mg PO every day. -Check BMP/BNP on 3/25. Osteoporosis. Start Calcium/Vitamin D every day. Consider the addition of medication as an outpatient in addition to resistance training and vibration plate therapy. Pain management - fairly recent right hip fracture. Will require regular pain assessment and comprehensive pain management. PRN oxycodone and APAP; history of T12-L1 compression fractures. -3/23. Good pain control.     Pneumonia prophylaxis - incentive spirometer every hour while awake.     DVT risk / DVT prophylaxis - daily physician / PA exam to assess as patient is at increased risk for of thromboembolism. Mobilize as tolerated. Sequential pneumatic compression devices (SCDs) when in bed; thigh-high or knee-high thromboembolic deterrent hose when out of bed. No anticoagulation due to SDH.  -3/21 on ASA since 3/19; okay to start Plavix per NSG     GERD - continue PPI BID. At times may need additional antacids, Maalox prn. Depression - continue on Lexapro. At risk of depression exacerbation due to re-hospitalization and decline in functional mobility and independence. Resume PRN Ativan for anxiety.     General skin care / wound prevention - monitor general skin status daily per staff and physician / PA. At risk for failure due to impaired mobility.  Patient with multiple bruises and friable skin.     Bladder program - voiding without difficulty.     Bowel program - at risk for constipation as a side effect of opioids, other medications, impaired mobility, etc. MiraLAX daily for regularity, Marisela-Colace for stool softener. PRN MOM, bisacodyl suppository or tablets for constipation.     Hyperthyroidism - continue Tapazole. Time spent was 35 minutes with over 1/2 in direct patient care/examination, consultation and coordination of care. Signed By: Fuad Mai MD    March 23, 2022      Physician Assistant with [facility]  Anh Maciel MD, Medical Director

## 2022-03-23 NOTE — PROGRESS NOTES
PHYSICAL THERAPY DAILY NOTE  Time In: 1031  Time Out: 7830  Patient Seen For: AM;Transfer training; Therapeutic exercise;Gait training    Subjective: \"I am ready to get up and work on my walking\"         Objective: Vital Signs:   Visit Vitals  BP (!) 148/56   Pulse 71   Temp 97.8 °F (36.6 °C)   Resp 16   Wt 62.3 kg (137 lb 6.4 oz)   SpO2 97%   BMI 20.89 kg/m²     Pain level: Patient had minimal complains of pain during therapy this morning    Other (comment) (Fall precautions, WBAT RLE)    COGNITION Daily Assessment    Alert, oriented and able to follow commands. Engages in conversation appropriately and stays on tasks well. Likes to direct her care and can become anxious at times. TRANSFERS Daily Assessment   Steady and controlled but complains of dizziness at times upon standing Transfer Type: SPT with walker  Other: toilet transfer with SBA  Transfer Assistance : 5 (Stand-by assistance)  Sit to Stand Assistance: Stand-by assistance  Car Transfers: Not tested     GAIT Daily Assessment   Improved gait distance today and likes to set her goals for ambulation. Steady with the RW during gait. Amount of Assistance: 5 (Stand-by assistance)  Distance (ft): 125 Feet (ft) (100' 1st walk)  Assistive Device: Walker, rolling     BALANCE Daily Assessment    Sitting - Static: Good (unsupported)  Sitting - Dynamic: Good (unsupported)  Standing - Static: Fair  Standing - Dynamic : Impaired     LOWER EXTREMITY EXERCISES Daily Assessment    Extremity: Both  Exercise Type #1: Seated lower extremity strengthening (Motomed x 10 level 3)  Level of Assist: Supervision          Assessment: Patient participated and tolerated therapy well this morning. She remains on 1L of O2 with her sats at 97%. She was 95% on room air when seated in the bedside chair. She tolerated longer distance gait this morning with some SOB after longer gait distance.         Patient ambulated back to her room with the RW with SBA and returned to her bedside chair with her call light and personal items in reach. Plan of Care: Continue to treat and progress towards her goals.     Mic Alexander  2/18/8067

## 2022-03-23 NOTE — PROGRESS NOTES
OT Daily Note    Time In 0922   Time Out 1003     Subjective: \"I can feel my blood pressure going up. \" Pt agreeable to treatment. Pain: No pain expressed  Interdisciplinary Communication: Team Conference  Precautions: Falls     Patient Vitals for the past 8 hrs:   Temp Pulse Resp BP SpO2   03/23/22 1003  71  (!) 148/56    03/23/22 1000     98 %   03/23/22 0955  71  (!) 148/56 100 %   03/23/22 0925  75  (!) 169/72 99 %   03/23/22 0809 97.8 °F (36.6 °C) 68 16 (!) 114/50 97 %         Mobility   Score Comments   Supine to Sit 6: Independent    Sit to Stand 4: Supervision or touching A SBA   Transfer Assist 4: Supervision or touching A Transfer Type: SPT   Equipment: Rolling Walker   Comments: SBA, cues for O2 NC management   Ambulation 4: Supervision or touching A Equipment: Rolling Walker   Comments: SBA-CGA, cues for O2 NC management and RW proximity     Activities of Daily Living    Score Comments   Eating 6: Independent    Oral Hygiene 6: Independent Seated at sink   Lower Body Dressing 4: Supervision or touching A Items Applied: Underwear and Elastic pants  Position: Standing PRN and Unsupported Sitting  Adaptive Equipment: RW  Comments: SBA in stance, pt with loss of balance backward, able to safely sit on bed. Reiterated importance of dressing in front of a chair for safety at d/c.    Education Activity pacing, Breathing technique, O2 nasal cannula management training, Rolling walker management and Safety awareness     Assessment: Patient reports she can feel her BP rising after supine > sit, reports RN held her BP medicine this AM due to hypotension. BP monitored throughout, RN notified. SpO2 % on 1.5L O2 NC. Discussed target range of 88-92% SpO2 for individuals with COPD, pt and MD agreeable to attempting to wean pt from O2 during the day. Pt demonstrated good participation in OT treatment.  Pt continues to benefit from skilled OT services to address remaining deficits and return back to baseline with improved independence and safety during I/ADLs. Patient ended session seated in recliner with call bell and needs within reach. Plan: Continue OT POC.      Marcelino Maurice OTR/MARIUSZ   3/23/2022

## 2022-03-24 PROBLEM — E87.3 HYPOKALEMIC ALKALOSIS: Status: ACTIVE | Noted: 2022-03-15

## 2022-03-24 PROBLEM — F32.A DEPRESSION: Status: ACTIVE | Noted: 2021-05-06

## 2022-03-24 PROBLEM — M51.36 DDD (DEGENERATIVE DISC DISEASE), LUMBAR: Status: ACTIVE | Noted: 2022-02-02

## 2022-03-24 PROBLEM — S06.5XAA SDH (SUBDURAL HEMATOMA): Status: ACTIVE | Noted: 2022-01-01

## 2022-03-24 PROBLEM — S06.5XAA POST-TRAUMATIC SUBDURAL HEMATOMA: Status: ACTIVE | Noted: 2022-01-01

## 2022-03-24 PROBLEM — S32.82XA MULTIPLE CLOSED ANTERIOR-POSTERIOR COMPRESSION FRACTURES OF PELVIS (HCC): Status: ACTIVE | Noted: 2022-01-01

## 2022-03-24 PROBLEM — F41.9 ANXIETY: Status: ACTIVE | Noted: 2021-05-06

## 2022-03-24 PROBLEM — S06.5XAA SUBDURAL HEMORRHAGE FOLLOWING INJURY: Status: ACTIVE | Noted: 2022-01-01

## 2022-03-24 PROCEDURE — 74011250637 HC RX REV CODE- 250/637: Performed by: PHYSICAL MEDICINE & REHABILITATION

## 2022-03-24 PROCEDURE — 97530 THERAPEUTIC ACTIVITIES: CPT

## 2022-03-24 PROCEDURE — 74011636637 HC RX REV CODE- 636/637: Performed by: PHYSICAL MEDICINE & REHABILITATION

## 2022-03-24 PROCEDURE — 97110 THERAPEUTIC EXERCISES: CPT

## 2022-03-24 PROCEDURE — 97116 GAIT TRAINING THERAPY: CPT

## 2022-03-24 PROCEDURE — 97535 SELF CARE MNGMENT TRAINING: CPT

## 2022-03-24 PROCEDURE — 74011250637 HC RX REV CODE- 250/637: Performed by: PHYSICIAN ASSISTANT

## 2022-03-24 PROCEDURE — 2709999900 HC NON-CHARGEABLE SUPPLY

## 2022-03-24 PROCEDURE — 65310000000 HC RM PRIVATE REHAB

## 2022-03-24 PROCEDURE — 3331090002 HH PPS REVENUE DEBIT

## 2022-03-24 PROCEDURE — 3331090001 HH PPS REVENUE CREDIT

## 2022-03-24 PROCEDURE — 97150 GROUP THERAPEUTIC PROCEDURES: CPT

## 2022-03-24 RX ADMIN — ASPIRIN 81 MG: 81 TABLET, CHEWABLE ORAL at 08:04

## 2022-03-24 RX ADMIN — LORAZEPAM 0.5 MG: 0.5 TABLET ORAL at 15:18

## 2022-03-24 RX ADMIN — LORAZEPAM 0.5 MG: 0.5 TABLET ORAL at 06:16

## 2022-03-24 RX ADMIN — ACETAMINOPHEN 650 MG: 325 TABLET ORAL at 13:36

## 2022-03-24 RX ADMIN — BUDESONIDE AND FORMOTEROL FUMARATE DIHYDRATE 2 PUFF: 160; 4.5 AEROSOL RESPIRATORY (INHALATION) at 20:00

## 2022-03-24 RX ADMIN — GABAPENTIN 100 MG: 100 CAPSULE ORAL at 08:04

## 2022-03-24 RX ADMIN — ATORVASTATIN CALCIUM 80 MG: 80 TABLET, FILM COATED ORAL at 21:12

## 2022-03-24 RX ADMIN — OXYCODONE HYDROCHLORIDE 5 MG: 5 TABLET ORAL at 15:55

## 2022-03-24 RX ADMIN — TIOTROPIUM BROMIDE INHALATION SPRAY 2 PUFF: 3.12 SPRAY, METERED RESPIRATORY (INHALATION) at 07:00

## 2022-03-24 RX ADMIN — ACETAMINOPHEN 650 MG: 325 TABLET ORAL at 06:08

## 2022-03-24 RX ADMIN — ESCITALOPRAM OXALATE 10 MG: 10 TABLET ORAL at 08:04

## 2022-03-24 RX ADMIN — GABAPENTIN 100 MG: 100 CAPSULE ORAL at 16:27

## 2022-03-24 RX ADMIN — EZETIMIBE 10 MG: 10 TABLET ORAL at 08:04

## 2022-03-24 RX ADMIN — GUAIFENESIN 600 MG: 600 TABLET ORAL at 08:05

## 2022-03-24 RX ADMIN — GUAIFENESIN 600 MG: 600 TABLET ORAL at 21:12

## 2022-03-24 RX ADMIN — POLYETHYLENE GLYCOL 3350 17 G: 17 POWDER, FOR SOLUTION ORAL at 08:05

## 2022-03-24 RX ADMIN — OXYCODONE HYDROCHLORIDE 5 MG: 5 TABLET ORAL at 21:20

## 2022-03-24 RX ADMIN — TORSEMIDE 10 MG: 20 TABLET ORAL at 08:04

## 2022-03-24 RX ADMIN — CALCIUM CARBONATE-CHOLECALCIFEROL TAB 250 MG-125 UNIT 1 TABLET: 250-125 TAB at 08:03

## 2022-03-24 RX ADMIN — PANTOPRAZOLE SODIUM 40 MG: 40 TABLET, DELAYED RELEASE ORAL at 16:27

## 2022-03-24 RX ADMIN — ACETAMINOPHEN 650 MG: 325 TABLET ORAL at 21:20

## 2022-03-24 RX ADMIN — PANTOPRAZOLE SODIUM 40 MG: 40 TABLET, DELAYED RELEASE ORAL at 06:11

## 2022-03-24 RX ADMIN — LORAZEPAM 0.5 MG: 0.5 TABLET ORAL at 22:58

## 2022-03-24 RX ADMIN — BUDESONIDE AND FORMOTEROL FUMARATE DIHYDRATE 2 PUFF: 160; 4.5 AEROSOL RESPIRATORY (INHALATION) at 07:00

## 2022-03-24 RX ADMIN — GABAPENTIN 100 MG: 100 CAPSULE ORAL at 21:13

## 2022-03-24 RX ADMIN — ZINC 1 TABLET: TAB ORAL at 08:05

## 2022-03-24 RX ADMIN — METHIMAZOLE 10 MG: 5 TABLET ORAL at 08:04

## 2022-03-24 RX ADMIN — PREDNISONE 15 MG: 10 TABLET ORAL at 08:04

## 2022-03-24 RX ADMIN — CLOPIDOGREL BISULFATE 75 MG: 75 TABLET ORAL at 08:04

## 2022-03-24 RX ADMIN — METOPROLOL SUCCINATE 100 MG: 50 TABLET, EXTENDED RELEASE ORAL at 08:04

## 2022-03-24 NOTE — PROGRESS NOTES
Pt is resting quietly in chair. Respirations even and unlabored. No signs of distress noted at this time. Call light within reach. Will continue to monitor.

## 2022-03-24 NOTE — PROGRESS NOTES
Contacted spouse/ex-spouse, Estuardo Smith, and he is agreeable to discharge date. He plans to continue to work with therapy as previously doing. All questions answered. CM to continue to follow and monitor for any further needs.

## 2022-03-24 NOTE — PROGRESS NOTES
OT Daily Note    Time In 0833   Time Out 0917     Subjective: Pt reports she feels lazy this morning, wants to take a nap. Pt agreeable to treatment. Objective: SpO2 97% on room air upon arrival. SpO2 stable on room air throughout, 88-93%, however pt with short of breath with increased work of breathing during sink bath, so O2 NC replaced at 0.5L O2. After ADL SpO2 97% on 0.5L O2 NC, so returned to room air. Pain: No pain expressed  Interdisciplinary Communication: Collaborated with PT to ensure progress towards POC and goals. Precautions: Falls, WBAT RLE     Patient Vitals for the past 8 hrs:   Temp Pulse Resp BP SpO2   03/24/22 1037     90 %   03/24/22 0900     (!) 88 %   03/24/22 0833     97 %   03/24/22 0741 98.1 °F (36.7 °C) 71 17 128/62 96 %         Mobility   Score Comments   Supine to Sit 4: Supervision or touching A    Sit to Stand 4: Supervision or touching A SBA   Transfer Assist 4: Supervision or touching A Transfer Type: SPT   Equipment: Rolling Walker   Comments: SBA-CGA, cues for O2 NC management, pt tangling herself up during [facility] transfer     Activities of Daily Living    Score Comments   Bathing 4: Supervision or touching A Type of Shower: Bath Pack  Position: Standing PRN and Unsupported Sitting   Adaptive Equipment: N/A  Comments: Cues for pacing and self-monitoring SpO2. 0.5L O2 NC replaced due to SOB.    Upper Body  Dressing 3: Partial/Moderate A Items Applied: Pullover and Bra  Position: Unsupported Sitting  Comments: Saravanan to identify and fix incorrect application of bra   Lower Body Dressing 4: Supervision or touching A Items Applied: Underwear and Elastic pants  Position: Standing PRN and Unsupported Sitting  Adaptive Equipment: RW  Comments: SBA in stance   Toilet Transfer 3: Partial/Moderate A Transfer Type: SPT   Equipment: N/A   Comments: CGA-Saravanan, cues for O2 NC management and to untangle legs in sitting   Toileting Hygiene 4: Supervision or touching A Output: urine x1, BM x1     Education Activity pacing, Breathing technique, Fall precautions, Functional transfer training and O2 nasal cannula management training     Assessment: Patient attempted ADL on room air, became SOB so replaced on 0.5L O2 NC. Pt demonstrated good participation in OT treatment. Pt continues to benefit from skilled OT services to address remaining deficits and return back to baseline with improved independence and safety during I/ADLs. Patient ended session seated in wheelchair with call bell and needs within reach. Plan: Continue OT POC.      Alondra Hall OTR/L   3/24/2022

## 2022-03-24 NOTE — PROGRESS NOTES
PHYSICAL THERAPY DAILY NOTE  Time In: 3546  Time Out: 1559  Patient Seen For: PM;Therapeutic exercise;Gait training    Subjective: \"I have been feeling SOB but my O2 sats are good\"         Objective: Vital Signs:   Visit Vitals  /65 (BP 1 Location: Left arm, BP Patient Position: Sitting)   Pulse 73   Temp 97.8 °F (36.6 °C)   Resp 16   Wt 59.6 kg (131 lb 6.4 oz)   SpO2 95%   BMI 19.98 kg/m²     Pain level: Patient had no complaints of pain during therapy this afternoon. Other (comment) (Fall precautions, WBAT RLE)    COGNITION Daily Assessment    Same as previous note     TRANSFERS Daily Assessment   Slow, cautious and steady with RW Transfer Type: SPT without device  Transfer Assistance : 5 (Stand-by assistance)  Sit to Stand Assistance: Stand-by assistance  Car Transfers: Not tested     GAIT Daily Assessment   Had increased dizziness upon standing this afternoon but was able to work through it to walk to her room from the gym with the RW. Improved gait pattern with more balanced weight bearing. Amount of Assistance: 5 (Stand-by assistance)  Distance (ft): 70 Feet (ft)  Assistive Device: Walker, rolling     BALANCE Daily Assessment    Sitting - Static: Good (unsupported)  Sitting - Dynamic: Good (unsupported)  Standing - Static: Good;Constant support  Standing - Dynamic : Impaired     LOWER EXTREMITY EXERCISES Daily Assessment    Extremity: Right  Exercise Type #1: Seated lower extremity strengthening  Sets Performed: 3  Reps Performed: 10  Level of Assist: Contact guard assistance  Exercise Type #2: Seated lower extremity strengthening (motomed x 10 level 3)  Level of Assist: Supervision          Assessment: Patient participated and tolerated therapy well this afternoon. She was more SOB at the start of treatment with some stated feelings of anxiety. She was provided medication for anxiety and pain during the treatment.   She has been ambulating and functioning on room air for therapy with sats at 92% at rest and drops to 87% with exertion but recovers well with seated rest.  She appears to know her limits well. She is very kind and appears to be making steady progress in therapy. Patient ambulated to her room with SBA using the RW and was seated in the bedside chair with her call light and personal items in reach. Plan of Care: Continue to treat and progress as tolerated.       Xiagn Knowles [place]  4/94/3258

## 2022-03-24 NOTE — PROGRESS NOTES
Pt resting quietly in bed. Respirations are even and unlabored on 1L NC. No signs of distress noted or needs stated at this time. Call light in reach. Will continue to monitor. SBAR to be given to oncoming nurse.

## 2022-03-24 NOTE — PROGRESS NOTES
PHYSICAL THERAPY DAILY NOTE  Time In: (P) 3079  Time Out: (P) 1006  Patient Seen For: (P) AM;Transfer training;Gait training; Other (see progress notes)    Subjective: \" I just want this O2 off during the day when I go home. \"         Objective: O2 at 1 liter. No pain noted. During gait ambulated 70 feet with rolling walker on room air . Patient O2 sats would drop to 88% but within less than 2 minutes would return to the 90s. Instructed patient to check O2 sats. Discussed with nurse Jayne Peralta that she was on room air in her room. Other (comment) (Fall precautions, WBAT RLE)  GROSS ASSESSMENT Daily Assessment            COGNITION Daily Assessment    intact       BED/MAT MOBILITY Daily Assessment    Rolling Right : (P) 0 (Not tested)  Rolling Left : (P) 0 (Not tested)  Supine to Sit : (P) 0 (Not tested)  Sit to Supine : (P) 0 (Not tested)       TRANSFERS Daily Assessment    Transfer Type: (P) SPT with walker  Transfer Assistance : (P) 5 (Stand-by assistance)  Sit to Stand Assistance: (P) Stand-by assistance  Car Transfers: (P) Not tested       GAIT Daily Assessment   Patient required cues to pace herself with gait. Amount of Assistance: (P) 5 (Stand-by assistance)  Distance (ft): (P) 70 Feet (ft)  Assistive Device: (P) Walker, rolling       STEPS or STAIRS Daily Assessment    Level of Assist : (P) 0 (Not tested)       BALANCE Daily Assessment            WHEELCHAIR MOBILITY Daily Assessment            LOWER EXTREMITY EXERCISES Daily Assessment               Assessment: Patient is making good progress toward her goals. Plan of Care: Continue with plan of care.     Mickey Pugh, PANCHO  3/24/2022

## 2022-03-24 NOTE — PROGRESS NOTES
OT Daily Note  Time In 1346   Time Out 1438     Subjective: Pt agreeable to treatment, reports she wants to work on standing. Pain: No pain expressed  Interdisciplinary Communication: Collaborated with PT to ensure progress towards POC and goals. Precautions: Falls, WBAT RLE     Patient Vitals for the past 8 hrs:   Temp Pulse Resp BP SpO2   03/24/22 1400     (!) 88 %   03/24/22 1037     90 %   03/24/22 0910     97 %   03/24/22 0900     (!) 88 %   03/24/22 0833     97 %   03/24/22 0741 98.1 °F (36.7 °C) 71 17 128/62 96 %         Functional Mobility    Score Comments   Sit to Stand 4: Supervision or touching A SBA   Transfer Assist 4: Supervision or touching A Transfer Type: SPT   Equipment: N/A   Comments: SBA         Activity Tolerance and Strengthening   Pt completed 6 minutes on the ergometer, 1/2 forwards and 1/2 backwards, with mild resistance to increase UB strength and activity tolerance for integration into functional tasks. Focus for this activity on self-pacing, weaning O2, and self-monitoring SpO2. SpO2 88-94% on room air. Activity Tolerance and Balance   Pt practiced standing in standing frame table with SBA to address standing tolerance, upright posture, and standing balance. Pt cued to remove UE support from table to further challenge balance. SpO2 stable >90% with activity. Pt able to stand without UE support for ~1.5 minutes at a time. Education   Breathing technique and Safety awareness     Assessment: Pt demonstrated good participation in OT treatment. Pt continues to benefit from skilled OT services to address remaining deficits and return back to baseline with improved independence and safety during I/ADLs. Patient ended session seated in recliner with call bell and needs within reach. Plan: Continue OT POC.      Tj Marmolejo OTR/L   3/24/2022

## 2022-03-24 NOTE — PROGRESS NOTES
Pt given Oxycodone IR 5 mg for pain.       03/23/22 2110   Pain 1   Pain Scale 1 Numeric (0 - 10)   Pain Intensity 1 8   Patient Stated Pain Goal 0   Pain Onset 1 acute   Pain Location 1 Leg   Pain Orientation 1 Right   Pain Description 1 Aching   Pain Intervention(s) 1 Medication (see MAR)

## 2022-03-24 NOTE — PROGRESS NOTES
Problem: Falls - Risk of  Goal: *Absence of Falls  Description: Document Joaquín Salvador Fall Risk and appropriate interventions in the flowsheet.   Outcome: Progressing Towards Goal  Note: Fall Risk Interventions:  Mobility Interventions: Patient to call before getting OOB,Utilize walker, cane, or other assistive device         Medication Interventions: Patient to call before getting OOB    Elimination Interventions: Call light in reach,Patient to call for help with toileting needs,Toileting schedule/hourly rounds    History of Falls Interventions: Evaluate medications/consider consulting pharmacy         Problem: Patient Education: Go to Patient Education Activity  Goal: Patient/Family Education  Outcome: Progressing Towards Goal

## 2022-03-24 NOTE — PROGRESS NOTES
Nora Pinzon MD  Medical Director  [address] [facility], [address] [facility]  Tel: [phone] [address]an PROGRESS NOTE    Sadie Shelton [facility]  Admit Date: 3/18/2022  Admit Diagnosis:   SDH (subdural hematoma) ([facility] [address] [S06.5X9A]    Subjective     Patient seen and examined. No CP/SOB today. Normal B/B function. Trace edema today. Torsemide increasing urination and respiration. Off of O2 and saturations 95% on RA. Mild HA but continues to improve with time.     Objective:     Current Facility-Administered Medications   Medication Dose Route Frequency    multivitamin w ZN (STRESSTABS W ZINC) tablet  1 Tablet Oral DAILY    calcium-vitamin D (OS-DOMINGO +D3) 250 mg-125 unit per tablet 1 Tablet  1 Tablet Oral DAILY    torsemide (DEMADEX) tablet 10 mg  10 mg Oral DAILY    clopidogreL (PLAVIX) tablet 75 mg  75 mg Oral DAILY    azithromycin (ZITHROMAX) tablet 500 mg  500 mg Oral Q MON, WED & FRI    budesonide-formoteroL (SYMBICORT) 160-4.5 mcg/actuation HFA inhaler 2 Puff  2 Puff Inhalation BID RT    escitalopram oxalate (LEXAPRO) tablet 10 mg  10 mg Oral DAILY    ezetimibe (ZETIA) tablet 10 mg  10 mg Oral DAILY    gabapentin (NEURONTIN) capsule 100 mg  100 mg Oral TID    LORazepam (ATIVAN) tablet 0.5 mg  0.5 mg Oral Q8H PRN    methIMAzole (TAPAZOLE) tablet 10 mg  10 mg Oral DAILY    metoprolol succinate (TOPROL-XL) XL tablet 100 mg  100 mg Oral DAILY    nitroglycerin (NITROSTAT) tablet 0.4 mg  0.4 mg SubLINGual PRN    ondansetron (ZOFRAN ODT) tablet 4 mg  4 mg Oral Q6H PRN    oxyCODONE IR (ROXICODONE) tablet 5 mg  5 mg Oral Q4H PRN    pantoprazole (PROTONIX) tablet 40 mg  40 mg Oral ACB&D    polyethylene glycol (MIRALAX) packet 17 g  17 g Oral DAILY    predniSONE (DELTASONE) tablet 15 mg  15 mg Oral DAILY WITH BREAKFAST    tiotropium bromide (SPIRIVA RESPIMAT) 2.5 mcg /actuation  2 Puff Inhalation DAILY    traZODone (DESYREL) tablet 50 mg  50 mg Oral QHS PRN    acetaminophen (TYLENOL) tablet 650 mg  650 mg Oral Q4H PRN    atorvastatin (LIPITOR) tablet 80 mg  80 mg Oral QHS    bisacodyL (DULCOLAX) tablet 5 mg  5 mg Oral DAILY PRN    senna-docusate (PERICOLACE) 8.6-50 mg per tablet 2 Tablet  2 Tablet Oral QHS    guaiFENesin ER (MUCINEX) tablet 600 mg  600 mg Oral Q12H    aspirin chewable tablet 81 mg  81 mg Oral DAILY       Review of Systems:   Denies cough, visual problems, abdominal pain, dysuria, calf pain. Pertinent positives are as noted in the HPI, ROS unremarkable otherwise. Visit Vitals  /62 (BP 1 Location: Left arm, BP Patient Position: At rest)   Pulse 71   Temp 98.1 °F (36.7 °C)   Resp 17   Wt 59.6 kg (131 lb 6.4 oz)   SpO2 90%   BMI 19.98 kg/m²        Physical Exam:   General: Alert, oriented x 4. OOB and ambulating today with PT. HEENT: Normocephalic. Oral mucosa moist.   Lungs: CTA without R/R/R. Respirations unlabored. Heart: Regular rate and rhythm, normal S1, S2. No appreciable murmur. Abdomen: Soft, non-tender, not distended. Bowel sounds normoactive, no organomegaly. Genitourinary: Deferred. Neuromuscular:        PERRLA, EOM intact, visual fields full to finger confrontation. No gross focal neuro deficits. UE strength at biceps, triceps and  4+/5 and symmetric bilaterally. LE strength at HF 4-/5 (R), 4/5 (L), distally 4+/5 and symmetric s/p right IT fracture. Sensation intact distally. Skin/extremity: No rashes, no erythema. Calves soft, non-tender BLE. Multiple resolving ecchymoses to UE, R>>L. Trace edema today.        Functional Assessment:  Balance  Sitting - Static: Good (unsupported) (03/23/22 1500)  Sitting - Dynamic: Good (unsupported) (03/23/22 1500)  Standing - Static: Good (03/23/22 1500)  Standing - Dynamic : Impaired (03/23/22 1500)       Marcellecynthia Gruberer Fall Risk Assessment:  Marcellecynthia Gruberer Fall Risk  Mobility: Ambulates or transfers with assist devices or assistance (03/24/22 0992)  Mobility Interventions: Patient to call before getting OOB;Utilize walker, cane, or other assistive device (03/24/22 0726)  Mentation: Alert, oriented x 3 (03/24/22 0726)  Medication: Patient receiving anticonvulsants, sedatives(tranquilizers), psychotropics or hypnotics, hypoglycemics, narcotics, sleep aids, antihypertensives, laxatives, or diuretics (03/24/22 0726)  Medication Interventions: Patient to call before getting OOB (03/24/22 0726)  Elimination: Needs assistance with toileting (03/24/22 0726)  Elimination Interventions: Call light in reach; Patient to call for help with toileting needs; Toileting schedule/hourly rounds (03/24/22 0726)  Prior Fall History: Before admission in past 12 months _home or previous inpatient care) (03/24/22 0726)  History of Falls Interventions: Evaluate medications/consider consulting pharmacy (03/24/22 0726)  Total Score: 4 (03/24/22 0726)  Standard Fall Precautions: Yes (03/23/22 2010)  High Fall Risk: Yes (03/24/22 0726)     Ambulation:  Gait  Distance (ft): 30 Feet (ft) (30' without O2) (03/23/22 1500)  Assistive Device: Walker, rolling (03/23/22 1500)  Rail Use:  (with RW) (03/22/22 1700)     Labs/Studies:  No results found for this or any previous visit (from the past 67 hour(s)).     Assessment:     Problem List as of 3/24/2022 Date Reviewed: 3/21/2022          Codes Class Noted - Resolved    Chest pain ICD-10-CM: R07.9  ICD-9-CM: 786.50  5/31/2019 - Present        * (Principal) SDH (subdural hematoma) ([facility] ICD-10-CM: W49.3L3X  ICD-9-CM: 432.1  3/18/2022 - Present        Post-traumatic subdural hematoma ([facility] ICD-10-CM: X64.7M9E  ICD-9-CM: 852.20  3/15/2022 - Present        Hypokalemic alkalosis ICD-10-CM: E87.3  ICD-9-CM: 276.3  3/15/2022 - Present        EKG, abnormal ICD-10-CM: R94.31  ICD-9-CM: 794.31  2/15/2022 - Present        Femur fracture, right ([address] ICD-10-CM: X32.70JH  ICD-9-CM: 821.00  2/11/2022 - Present        COPD with acute lower respiratory infection [facility]) ICD-10-CM: J44.0  ICD-9-CM: 810, 519.8  2/10/2022 - Present        Moderate to severe mitral regurgitation ICD-10-CM: I34.0  ICD-9-CM: 424.0  2/8/2022 - Present        Iron deficiency anemia due to chronic blood loss (Chronic) ICD-10-CM: D50.0  ICD-9-CM: 280.0  2/8/2022 - Present        Closed right hip fracture ([facility] [address] ICD-10-CM: S72.001A  ICD-9-CM: 820.8  2/4/2022 - Present        Other fracture of right femur, initial encounter for closed fracture ([facility] [address] ICD-10-CM: A93.7D3N  ICD-9-CM: 821.00  2/4/2022 - Present        Multiple closed anterior-posterior compression fractures of pelvis ([facility] [address] ICD-10-CM: X30.43DJ  ICD-9-CM: 808.44  2/2/2022 - Present        DDD (degenerative disc disease), lumbar ICD-10-CM: M51.36  ICD-9-CM: 722.52  2/2/2022 - Present        DNR (do not resuscitate) ICD-10-CM: Z66  ICD-9-CM: V49.86  11/9/2021 - Present    Overview Signed 11/9/2021 12:25 PM by Christopher Mittal NP     POST form completed - DNR but full treatment, including intubation. No TRACH. No PEG. Acute bilateral low back pain without sciatica ICD-10-CM: M54.50  ICD-9-CM: 724.2, 338.19  11/9/2021 - Present        Palliative care patient ICD-10-CM: Z51.5  ICD-9-CM: V66.7  7/14/2021 - Present        Depression ICD-10-CM: F32. A  ICD-9-CM: 287  5/6/2021 - Present        Anxiety ICD-10-CM: F41.9  ICD-9-CM: 300.00  5/6/2021 - Present        Ischemic heart disease, hx pci, cath/pci last 5/2019 ICD-10-CM: I25.9  ICD-9-CM: 414.9  2/24/2021 - Present        H/O carotid endarterectomy ICD-10-CM: Z98.890  ICD-9-CM: V45.89  9/21/2020 - Present        Right ear pain ICD-10-CM: H92.01  ICD-9-CM: 388.70  9/21/2020 - Present        Pulmonary mass ICD-10-CM: R91.8  ICD-9-CM: 786.6  2/11/2020 - Present        Centrilobular emphysema ([facility] [address] ICD-10-CM: J43.2  ICD-9-CM: 492.8  12/20/2019 - Present        Carotid stenosis ICD-10-CM: I65.29  ICD-9-CM: 433.10  12/3/2019 - Present        Carotid stenosis, right ICD-10-CM: I65.21  ICD-9-CM: 433.10  12/3/2019 - Present        Hypertensive urgency ICD-10-CM: I16.0  ICD-9-CM: 401.9  6/8/2019 - Present        Chronic respiratory failure with hypoxia (HCC) (Chronic) ICD-10-CM: J96.11  ICD-9-CM: 518.83, 799.02  6/8/2019 - Present        CAD S/P percutaneous coronary angioplasty ICD-10-CM: I25.10, Z98.61  ICD-9-CM: 414.01, V45.82  5/31/2019 - Present        Ventricular ectopy ICD-10-CM: I49.3  ICD-9-CM: 427.69  5/9/2017 - Present        Chronic anxiety ICD-10-CM: F41.9  ICD-9-CM: 300.00  4/27/2017 - Present        Hyperlipidemia (Chronic) ICD-10-CM: E78.5  ICD-9-CM: 272.4  10/10/2016 - Present        Essential hypertension, benign (Chronic) ICD-10-CM: I10  ICD-9-CM: 401.1  10/10/2016 - Present        Coronary artery disease involving native coronary artery of native heart without angina pectoris ICD-10-CM: I25.10  ICD-9-CM: 414.01  10/10/2016 - Present        Hypoxemia ICD-10-CM: R09.02  ICD-9-CM: 799.02  8/25/2013 - Present    Overview Signed 2/5/2014  9:08 AM by Marilyn Stratton NP     FILIBERTO 8/2013. Pt had over 2 hours 1/2 hours of desaturations at night. O2 was ordered for her to start at 2L in September, but she stated she was feeling better than when FILIBERTO was done and refused O2. FILIBERTO 12/2013: not performed secondary to patient factors.               Acute respiratory failure with hypoxia [facility]) ICD-10-CM: J96.01  ICD-9-CM: 518.81  8/22/2013 - Present        COPD, very severe (HCC) (Chronic) ICD-10-CM: J44.9  ICD-9-CM: 496  8/20/2013 - Present        Personal history of tobacco use ICD-10-CM: P17.493  ICD-9-CM: V15.82  8/19/2013 - Present        History of MI (myocardial infarction) ICD-10-CM: I25.2  ICD-9-CM: 776  8/19/2013 - Present    Overview Addendum 8/30/2018  9:20 AM by Chavo Tanner MD     STEMI 8/19/13:  Angioplasty for in-stent stenosis to LAD             RESOLVED: Chronic obstructive pulmonary disease ([facility] ICD-10-CM: J44.9  ICD-9-CM: 965  10/10/2016 - 8/30/2018 RESOLVED: Acute systolic heart failure ([facility] [address] ICD-10-CM: I50.21  ICD-9-CM: 428.21  8/22/2013 - 8/30/2018        RESOLVED: Pulmonary hemorrhage ICD-10-CM: R04.89  ICD-9-CM: 786.30  8/20/2013 - 6/25/2019        RESOLVED: CAD (coronary artery disease) (Chronic) ICD-10-CM: I25.10  ICD-9-CM: 414.00  8/19/2013 - 8/3/2021        RESOLVED: COPD (chronic obstructive pulmonary disease) (HCC) (Chronic) ICD-10-CM: J44.9  ICD-9-CM: 496  8/19/2013 - 8/20/2013            S/p traumatic subdural hematoma     Plan / Recommendations / Medical Decision Making:     Continue daily physician / PA medical management:    Subdural hematoma - PT/OT; good rehab potential.     S/p right IT fracture 2/2022 now s/p fall with fx of the lesser trochanter; WBAT. No surgery required, per Dr Andreas Givens.     Hyperlipidemia - continue Lipitor and Zetia.     COPD - O2- and steroid-dependent, 1-2 L O2. Continue Symbicort, Spiriva and prednisone 15mg. Encourage IS. She is on prophylactic azithromycin 500mg MWF for inflammation due to COPD / emphysema. There was concern during hospitalization 2/2022 of pulmonary malignancy with a 2.8cm left perihilar lower lobe spiculated mass per CT chest 2/26/2022.  -3/22. Stable on 0.5-2.0 L O2 via NC. Lungs clear.  -3/23. Sats. 95-98% on 2 L O2 via NC.  -3/24. Sats. 95-96% on RA. O2 PRN.     Hx of MI - s/p stent to LAD and distal RCA 2/7/2022; continue statin, was on Effient at home. Plans to restart ASA and begin Plavix; will wait 2wks post SDH; this was a very small bleed. ASA 81mg restarted on [facility] admission; monitor clinically. Seen by Dr Dionisio Reyna 3/16.  -3/21 per Cardiology, Emilee Nunez I would like to start back dual antiplatelet therapy (using aspirin and clopidogrel) in 3 to 4 days if possible\"; per Neurosurgery, okay to start Plavix  -3/24. ASA/Plavix causing challenges with skin.  Continue to monitor.     Hypertension - BP fluctuating, managed medically. Continue Toprol XL, PRN SL NTG.  -3/20 HR 72, /74  -3/21 BP 112/53 this AM then 168/76 with HA, CP; HR consistently in 70s; monitor during therapy  -3/22. BP fluctuating. /. No dizziness or orthostasis. Increasing LE edema. Resume Torsemide 10 mg PO every day. Compression garments.   -3/23. BP stable a t 114-148/50-56. Toprol XL held this am. Continue to monitor. Continue Torsemide and compression garments.   -3/24. /62 today. Continue current regimen. Anemia - Hgb 9.3 and stable.  -3/21 Hgb 8.3, drifting down; recheck in 2d  -3/22. Check CBC on 3/25. Start B Complex.     Hypokalemia - resolved; monitor and replace if needed. -3/21 K+ 4.1 today.  -3/25. BMP/BNP scheduled for 3/25. CHF. BNP markedly elevated at 4033.  -3/22. Resume Torsemide 10 mg PO every day. -Check BMP/BNP on 3/25. Osteoporosis. Start Calcium/Vitamin D every day. Consider the addition of medication as an outpatient in addition to resistance training and vibration plate therapy. Pain management - fairly recent right hip fracture. Will require regular pain assessment and comprehensive pain management. PRN oxycodone and APAP; history of T12-L1 compression fractures. -3/23. Good pain control.     Pneumonia prophylaxis - incentive spirometer every hour while awake.     DVT risk / DVT prophylaxis - daily physician / PA exam to assess as patient is at increased risk for of thromboembolism. Mobilize as tolerated. Sequential pneumatic compression devices (SCDs) when in bed; thigh-high or knee-high thromboembolic deterrent hose when out of bed. No anticoagulation due to SDH.  -3/21 on ASA since 3/19; okay to start Plavix per NSG     GERD - continue Protonix BID. At times may need additional antacids, Maalox prn. Depression - continue on Lexapro. At risk of depression exacerbation due to re-hospitalization and decline in functional mobility and independence.  Resume PRN Ativan for anxiety.     General skin care / wound prevention - monitor general skin status daily per staff and physician / PA. At risk for failure due to impaired mobility. Patient with multiple bruises and friable skin.     Bladder program - voiding without difficulty.     Bowel program - at risk for constipation as a side effect of opioids, other medications, impaired mobility, etc. MiraLAX daily for regularity, Marisela-Colace for stool softener. PRN MOM, bisacodyl suppository or tablets for constipation.     Hyperthyroidism - continue Tapazole. Time spent was 25 minutes with over 1/2 in direct patient care/examination, consultation and coordination of care.      Signed By: Donna Madera MD    March 24, 2022

## 2022-03-25 LAB
ANION GAP SERPL CALC-SCNC: 5 MMOL/L (ref 7–16)
BNP SERPL-MCNC: 2917 PG/ML (ref 5–125)
BUN SERPL-MCNC: 16 MG/DL (ref 8–23)
CALCIUM SERPL-MCNC: 9 MG/DL (ref 8.3–10.4)
CHLORIDE SERPL-SCNC: 105 MMOL/L (ref 98–107)
CO2 SERPL-SCNC: 30 MMOL/L (ref 21–32)
CREAT SERPL-MCNC: 0.6 MG/DL (ref 0.6–1)
ERYTHROCYTE [DISTWIDTH] IN BLOOD BY AUTOMATED COUNT: 15.4 % (ref 11.9–14.6)
GLUCOSE SERPL-MCNC: 92 MG/DL (ref 65–100)
HCT VFR BLD AUTO: 29.5 % (ref 35.8–46.3)
HGB BLD-MCNC: 9 G/DL (ref 11.7–15.4)
MCH RBC QN AUTO: 29.9 PG (ref 26.1–32.9)
MCHC RBC AUTO-ENTMCNC: 30.5 G/DL (ref 31.4–35)
MCV RBC AUTO: 98 FL (ref 79.6–97.8)
NRBC # BLD: 0 K/UL (ref 0–0.2)
PLATELET # BLD AUTO: 279 K/UL (ref 150–450)
PMV BLD AUTO: 9.4 FL (ref 9.4–12.3)
POTASSIUM SERPL-SCNC: 3.6 MMOL/L (ref 3.5–5.1)
RBC # BLD AUTO: 3.01 M/UL (ref 4.05–5.2)
SODIUM SERPL-SCNC: 140 MMOL/L (ref 136–145)
WBC # BLD AUTO: 6.1 K/UL (ref 4.3–11.1)

## 2022-03-25 PROCEDURE — 74011250637 HC RX REV CODE- 250/637: Performed by: PHYSICAL MEDICINE & REHABILITATION

## 2022-03-25 PROCEDURE — 74011636637 HC RX REV CODE- 636/637: Performed by: PHYSICAL MEDICINE & REHABILITATION

## 2022-03-25 PROCEDURE — 97116 GAIT TRAINING THERAPY: CPT

## 2022-03-25 PROCEDURE — 3331090001 HH PPS REVENUE CREDIT

## 2022-03-25 PROCEDURE — 97110 THERAPEUTIC EXERCISES: CPT

## 2022-03-25 PROCEDURE — 85027 COMPLETE CBC AUTOMATED: CPT

## 2022-03-25 PROCEDURE — 83880 ASSAY OF NATRIURETIC PEPTIDE: CPT

## 2022-03-25 PROCEDURE — 97535 SELF CARE MNGMENT TRAINING: CPT

## 2022-03-25 PROCEDURE — 94760 N-INVAS EAR/PLS OXIMETRY 1: CPT

## 2022-03-25 PROCEDURE — 65310000000 HC RM PRIVATE REHAB

## 2022-03-25 PROCEDURE — 94640 AIRWAY INHALATION TREATMENT: CPT

## 2022-03-25 PROCEDURE — 74011000250 HC RX REV CODE- 250: Performed by: PHYSICIAN ASSISTANT

## 2022-03-25 PROCEDURE — 97530 THERAPEUTIC ACTIVITIES: CPT

## 2022-03-25 PROCEDURE — 3331090002 HH PPS REVENUE DEBIT

## 2022-03-25 PROCEDURE — 2709999900 HC NON-CHARGEABLE SUPPLY

## 2022-03-25 PROCEDURE — 36415 COLL VENOUS BLD VENIPUNCTURE: CPT

## 2022-03-25 PROCEDURE — 77010033678 HC OXYGEN DAILY

## 2022-03-25 PROCEDURE — 74011250637 HC RX REV CODE- 250/637: Performed by: PHYSICIAN ASSISTANT

## 2022-03-25 PROCEDURE — 80048 BASIC METABOLIC PNL TOTAL CA: CPT

## 2022-03-25 RX ORDER — LEVALBUTEROL INHALATION SOLUTION 0.63 MG/3ML
0.63 SOLUTION RESPIRATORY (INHALATION)
Status: COMPLETED | OUTPATIENT
Start: 2022-03-25 | End: 2022-03-25

## 2022-03-25 RX ADMIN — PANTOPRAZOLE SODIUM 40 MG: 40 TABLET, DELAYED RELEASE ORAL at 05:46

## 2022-03-25 RX ADMIN — TORSEMIDE 10 MG: 20 TABLET ORAL at 08:00

## 2022-03-25 RX ADMIN — ACETAMINOPHEN 650 MG: 325 TABLET ORAL at 13:37

## 2022-03-25 RX ADMIN — BUDESONIDE AND FORMOTEROL FUMARATE DIHYDRATE 2 PUFF: 160; 4.5 AEROSOL RESPIRATORY (INHALATION) at 08:00

## 2022-03-25 RX ADMIN — NITROGLYCERIN 0.4 MG: 0.4 TABLET SUBLINGUAL at 08:20

## 2022-03-25 RX ADMIN — NITROGLYCERIN 0.4 MG: 0.4 TABLET SUBLINGUAL at 22:00

## 2022-03-25 RX ADMIN — CALCIUM CARBONATE-CHOLECALCIFEROL TAB 250 MG-125 UNIT 1 TABLET: 250-125 TAB at 08:03

## 2022-03-25 RX ADMIN — ACETAMINOPHEN 650 MG: 325 TABLET ORAL at 21:47

## 2022-03-25 RX ADMIN — GUAIFENESIN 600 MG: 600 TABLET ORAL at 21:47

## 2022-03-25 RX ADMIN — ZINC 1 TABLET: TAB ORAL at 08:03

## 2022-03-25 RX ADMIN — LORAZEPAM 0.5 MG: 0.5 TABLET ORAL at 18:39

## 2022-03-25 RX ADMIN — ACETAMINOPHEN 650 MG: 325 TABLET ORAL at 06:17

## 2022-03-25 RX ADMIN — GUAIFENESIN 600 MG: 600 TABLET ORAL at 08:01

## 2022-03-25 RX ADMIN — LEVALBUTEROL HYDROCHLORIDE 0.63 MG: 0.63 SOLUTION RESPIRATORY (INHALATION) at 16:06

## 2022-03-25 RX ADMIN — METOPROLOL SUCCINATE 100 MG: 50 TABLET, EXTENDED RELEASE ORAL at 08:03

## 2022-03-25 RX ADMIN — GABAPENTIN 100 MG: 100 CAPSULE ORAL at 08:00

## 2022-03-25 RX ADMIN — PREDNISONE 15 MG: 10 TABLET ORAL at 08:01

## 2022-03-25 RX ADMIN — AZITHROMYCIN MONOHYDRATE 500 MG: 250 TABLET ORAL at 21:47

## 2022-03-25 RX ADMIN — EZETIMIBE 10 MG: 10 TABLET ORAL at 08:03

## 2022-03-25 RX ADMIN — OXYCODONE HYDROCHLORIDE 5 MG: 5 TABLET ORAL at 21:46

## 2022-03-25 RX ADMIN — ASPIRIN 81 MG: 81 TABLET, CHEWABLE ORAL at 08:01

## 2022-03-25 RX ADMIN — TIOTROPIUM BROMIDE INHALATION SPRAY 2 PUFF: 3.12 SPRAY, METERED RESPIRATORY (INHALATION) at 09:00

## 2022-03-25 RX ADMIN — LORAZEPAM 0.5 MG: 0.5 TABLET ORAL at 06:27

## 2022-03-25 RX ADMIN — PANTOPRAZOLE SODIUM 40 MG: 40 TABLET, DELAYED RELEASE ORAL at 16:07

## 2022-03-25 RX ADMIN — POLYETHYLENE GLYCOL 3350 17 G: 17 POWDER, FOR SOLUTION ORAL at 08:04

## 2022-03-25 RX ADMIN — CLOPIDOGREL BISULFATE 75 MG: 75 TABLET ORAL at 08:01

## 2022-03-25 RX ADMIN — ATORVASTATIN CALCIUM 80 MG: 80 TABLET, FILM COATED ORAL at 21:47

## 2022-03-25 RX ADMIN — ESCITALOPRAM OXALATE 10 MG: 10 TABLET ORAL at 08:01

## 2022-03-25 RX ADMIN — GABAPENTIN 100 MG: 100 CAPSULE ORAL at 21:47

## 2022-03-25 RX ADMIN — METHIMAZOLE 10 MG: 5 TABLET ORAL at 08:04

## 2022-03-25 RX ADMIN — GABAPENTIN 100 MG: 100 CAPSULE ORAL at 16:07

## 2022-03-25 RX ADMIN — BUDESONIDE AND FORMOTEROL FUMARATE DIHYDRATE 2 PUFF: 160; 4.5 AEROSOL RESPIRATORY (INHALATION) at 19:25

## 2022-03-25 NOTE — PROGRESS NOTES
OT Daily Note    Time In 0830   Time Out 0915     Subjective: Pt reports rough morning with hypertension and CP. Pt agreeable to treatment, reports she is excited to get in the shower. Pain: No pain expressed  Interdisciplinary Communication: Communicated with RN to ensure progress towards POC and goals. Precautions: Falls, WBAT RLE     Patient Vitals for the past 8 hrs:   Temp Pulse Resp BP SpO2   03/25/22 0827   18 112/62 94 %   03/25/22 0819    (!) 161/69    03/25/22 0758 97.9 °F (36.6 °C) 72 16 (!) 108/56 100 %         Mobility   Score Comments   Sit to Stand 4: Supervision or touching A SBA   Transfer Assist 4: Supervision or touching A Transfer Type: SPT   Equipment: Rolling Walker and Grab Bars   Comments: SBA-CGA, assist to manage O2 NC with shower transfer     Activities of Daily Living    Score Comments   Bathing 4: Supervision or touching A Type of Shower: Shower  Position: Standing PRN and Unsupported Sitting   Adaptive Equipment: Tub Transfer Bench and Grab Bars  Comments: CGA in stance, cues for pacing and pursed lip breathing. Wore supplemental O2 for shower. Upper Body  Dressing 3: Partial/Moderate A Items Applied: Pullover and Bra  Position: Supported Sitting  Comments: Assist to fasten bra   Lower Body Dressing 4: Supervision or touching A Items Applied: Underwear and Elastic pants  Position: Supported sitting and Standing PRN  Adaptive Equipment: N/A  Comments: SBA in stance   Donning/[place] Footwear 5: S/U or clean-up assist Items Applied: Socks  Adaptive Equipment: N/A  Comments: cues for pacing   Education Activity pacing, Breathing technique, Functional transfer training, O2 nasal cannula management training, Rolling walker management and Safety awareness     Assessment: Patient required some increased assistance today due to more taxing ADL, pt taking shower rather than sponge bath. Pt demonstrated good participation in OT treatment.  Pt continues to benefit from skilled OT services to address remaining deficits and return back to baseline with improved independence and safety during I/ADLs. Patient ended session seated in recliner with call bell and needs within reach. Plan: Continue OT POC.      Linh eYh, OTR/L   3/25/2022

## 2022-03-25 NOTE — PROGRESS NOTES
PHYSICAL THERAPY DAILY NOTE  Time In: 5112  Time Out: 2113  Patient Seen For: AM;Gait training;Patient education; Therapeutic exercise;Transfer training; Other (see progress notes)    Subjective: patient reporting she feels OK now. Reports no more chest pain. Reports she had to take nitro earlier for chest pain. Reports she wants to wean herself off 02.         Objective:Vital Signs:on room air. 02 sat 95% and HR at 73 at rest. 02 sat 88% and HR 84 after ambulating 60 ft on room air. 02 sat 92% and HR 78 after 2 minutes of rest.  Patient Vitals for the past 12 hrs:   Temp Pulse Resp BP SpO2   03/25/22 0827   18 112/62 94 %   03/25/22 0819    (!) 161/69    03/25/22 0758 97.9 °F (36.6 °C) 72 16 (!) 108/56 100 %     Pain level:No c/o pain during treatment  Pain location:NA  Pain interventions:NA    Patient education:Bed mobility training,transfer training, gait training, balance training,fall precautions, body mechanics,activity pacing,energy conservation. Importance of when to use 02. Breathing techniques during gait. Patient verbalizing understanding and demonstrating understanding of patient education. Recommend follow up education.     Interdisciplinary Communication:spoke with OT regarding progress towards goals      Other (comment) (Fall precautions, WBAT RLE)  GROSS ASSESSMENT Daily Assessment     NA       COGNITION Daily Assessment    Alert, able to follow commands,cooperating,participating, motivated, less anxiety during mobility today         BED/MAT MOBILITY Daily Assessment    Rolling Right : 0 (Not tested)  Rolling Left : 0 (Not tested)  Supine to Sit : 0 (Not tested)  Sit to Supine : 0 (Not tested)       TRANSFERS Daily Assessment   Increased time and effort to complete with cues for body mechanics   Transfer Type: SPT with walker  Transfer Assistance : 5 (Supervision/setup)  Sit to Stand Assistance: Supervision  Car Transfers: Not tested       GAIT Daily Assessment    Amount of Assistance: 5 (Stand-by assistance)  Distance (ft): 60 Feet (ft) (60ft x 1  40 ft x 1  30 ft x 1 with 5 min sitting rest break)  Assistive Device: Walker, rolling   Gait training with one time cue for controlling gait balance with RR.    STEPS or STAIRS Daily Assessment    Steps/Stairs Ambulated (#): 0  Level of Assist : 0 (Not tested)       BALANCE Daily Assessment   No loss of balance during gait or transfer training. Sitting - Static: Good (unsupported)  Sitting - Dynamic: Good (unsupported)  Standing - Static: Fair (with RW)  Standing - Dynamic : Impaired       WHEELCHAIR MOBILITY Daily Assessment    Curbs/Ramps Assist Required (FIM Score): 0 (Not tested)  Wheelchair Setup Assist Required : 0 (Not tested)       LOWER EXTREMITY EXERCISES Daily Assessment   Increased time and effort to complete with multiple and frequent rest breaks. Cues for correct form   Extremity: Both  Exercise Type #1: Seated lower extremity strengthening  Sets Performed: 3  Reps Performed: 10  Level of Assist: Minimal assistance     SEATED EXERCISES Sets Reps Comments   Ankle Pumps 2 10    Hip Flexion 2 10    Long Arc Quads 2 10    Hip Adduction/Ball Squeeze 2 10    Hip Abduction with red Theraband 2 10    Hamstring Curls with red Theraband 2 10             Assessment: Patient progressing towards goals. Functional endurance and strength improving. Able to maintain 02 sat at 88% or above during gait training on room air. Improving with ability to control RR with gait speed. Patient returned to room at end of treatment and remained up in recliner with LEs elevated and with needs in reach. On room air, 02 sat 94%    Plan of Care: Continue with POC and progress as tolerated.      Thanh Basilio, PT  3/25/2022

## 2022-03-25 NOTE — PROGRESS NOTES
Problem: Falls - Risk of  Goal: *Absence of Falls  Description: Document Lisseth Luna Fall Risk and appropriate interventions in the flowsheet.   Outcome: Progressing Towards Goal  Note: Fall Risk Interventions:  Mobility Interventions: Patient to call before getting OOB,Utilize walker, cane, or other assistive device         Medication Interventions: Patient to call before getting OOB    Elimination Interventions: Call light in reach,Patient to call for help with toileting needs,Toileting schedule/hourly rounds    History of Falls Interventions: Door open when patient unattended         Problem: Patient Education: Go to Patient Education Activity  Goal: Patient/Family Education  Outcome: Progressing Towards Goal

## 2022-03-25 NOTE — PROGRESS NOTES
Chart reviewed. Referral placed to [facility] as pt requested. Liaison aware. CM to continue to follow and monitor for any further needs.

## 2022-03-25 NOTE — PROGRESS NOTES
Tal Dunn MD  Medical Director  [address] [facility], [address] [facility]  Tel: [phone] PROGRESS NOTE    Sumeet Chavarria [facility]  Admit Date: 3/18/2022  Admit Diagnosis:   SDH (subdural hematoma) ([facility] [address] [S06.5X9A]    Subjective     Patient seen and examined. A bout of CP this am after taking morning medication. Responded well to Nitro. Normal B/B function. Trace edema today but compression hose in place. Torsemide increasing urination and respiration. Resumed O2 today. Mild HA but continues to improve with time.     Objective:     Current Facility-Administered Medications   Medication Dose Route Frequency    multivitamin w ZN (STRESSTABS W ZINC) tablet  1 Tablet Oral DAILY    calcium-vitamin D (OS-DOMINGO +D3) 250 mg-125 unit per tablet 1 Tablet  1 Tablet Oral DAILY    torsemide (DEMADEX) tablet 10 mg  10 mg Oral DAILY    clopidogreL (PLAVIX) tablet 75 mg  75 mg Oral DAILY    azithromycin (ZITHROMAX) tablet 500 mg  500 mg Oral Q MON, WED & FRI    budesonide-formoteroL (SYMBICORT) 160-4.5 mcg/actuation HFA inhaler 2 Puff  2 Puff Inhalation BID RT    escitalopram oxalate (LEXAPRO) tablet 10 mg  10 mg Oral DAILY    ezetimibe (ZETIA) tablet 10 mg  10 mg Oral DAILY    gabapentin (NEURONTIN) capsule 100 mg  100 mg Oral TID    LORazepam (ATIVAN) tablet 0.5 mg  0.5 mg Oral Q8H PRN    methIMAzole (TAPAZOLE) tablet 10 mg  10 mg Oral DAILY    metoprolol succinate (TOPROL-XL) XL tablet 100 mg  100 mg Oral DAILY    nitroglycerin (NITROSTAT) tablet 0.4 mg  0.4 mg SubLINGual PRN    ondansetron (ZOFRAN ODT) tablet 4 mg  4 mg Oral Q6H PRN    oxyCODONE IR (ROXICODONE) tablet 5 mg  5 mg Oral Q4H PRN    pantoprazole (PROTONIX) tablet 40 mg  40 mg Oral ACB&D    polyethylene glycol (MIRALAX) packet 17 g  17 g Oral DAILY    predniSONE (DELTASONE) tablet 15 mg  15 mg Oral DAILY WITH BREAKFAST    tiotropium bromide (SPIRIVA RESPIMAT) 2.5 mcg /actuation  2 Puff Inhalation DAILY    traZODone (DESYREL) tablet 50 mg  50 mg Oral QHS PRN    acetaminophen (TYLENOL) tablet 650 mg  650 mg Oral Q4H PRN    atorvastatin (LIPITOR) tablet 80 mg  80 mg Oral QHS    bisacodyL (DULCOLAX) tablet 5 mg  5 mg Oral DAILY PRN    senna-docusate (PERICOLACE) 8.6-50 mg per tablet 2 Tablet  2 Tablet Oral QHS    guaiFENesin ER (MUCINEX) tablet 600 mg  600 mg Oral Q12H    aspirin chewable tablet 81 mg  81 mg Oral DAILY       Review of Systems:   Denies cough, visual problems, abdominal pain, dysuria, calf pain. Pertinent positives are as noted in the HPI, ROS unremarkable otherwise. Visit Vitals  /62   Pulse 72   Temp 97.9 °F (36.6 °C)   Resp 18   Wt 59.6 kg (131 lb 6.4 oz)   SpO2 94%   BMI 19.98 kg/m²        Physical Exam:   General: Alert, oriented x 4. OOB and ambulating today with PT. HEENT: Normocephalic. Oral mucosa moist.   Lungs: CTA without R/R/R. Respirations unlabored. Heart: Regular rate and rhythm, normal S1, S2. No appreciable murmur. Abdomen: Soft, non-tender, not distended. Bowel sounds normoactive, no organomegaly. Genitourinary: Deferred. Neuromuscular:        PERRLA, EOM intact, visual fields full to finger confrontation. No gross focal neuro deficits. UE strength at biceps, triceps and  4+/5 and symmetric bilaterally. LE strength at HF 4-/5 (R), 4/5 (L), distally 4+/5 and symmetric s/p right IT fracture. Sensation intact distally. Skin/extremity: No rashes, no erythema. Calves soft, non-tender BLE. Multiple resolving ecchymoses to UE, R>>L. Trace edema today.        Functional Assessment:  Balance  Sitting - Static: Good (unsupported) (03/25/22 1200)  Sitting - Dynamic: Good (unsupported) (03/25/22 1200)  Standing - Static: Fair (with RW) (03/25/22 1200)  Standing - Dynamic : Impaired (03/25/22 1200)       Fabienne Harris Fall Risk Assessment:  Fabienne Harris Fall Risk  Mobility: Ambulates or transfers with assist devices or assistance (03/25/22 0745)  Mobility Interventions: Patient to call before getting OOB;Utilize walker, cane, or other assistive device (03/25/22 0745)  Mentation: Alert, oriented x 3 (03/25/22 0745)  Medication: Patient receiving anticonvulsants, sedatives(tranquilizers), psychotropics or hypnotics, hypoglycemics, narcotics, sleep aids, antihypertensives, laxatives, or diuretics (03/25/22 0745)  Medication Interventions: Patient to call before getting OOB (03/25/22 0745)  Elimination: Needs assistance with toileting (03/25/22 0745)  Elimination Interventions: Call light in reach; Patient to call for help with toileting needs; Toileting schedule/hourly rounds (03/25/22 0745)  Prior Fall History: Before admission in past 12 months _home or previous inpatient care) (03/25/22 0745)  History of Falls Interventions: Door open when patient unattended (03/25/22 0745)  Total Score: 4 (03/25/22 0745)  Standard Fall Precautions: Yes (03/23/22 2010)  High Fall Risk: Yes (03/25/22 0745)     Ambulation:  Gait  Distance (ft): 60 Feet (ft) (60ft x 1  40 ft x 1  30 ft x 1 with 5 min sitting rest break) (03/25/22 1200)  Assistive Device: Walker, rolling (03/25/22 1200)  Rail Use:  (with RW) (03/22/22 1700)     Labs/Studies:  Recent Results (from the past 72 hour(s))   CBC W/O DIFF    Collection Time: 03/25/22  6:12 AM   Result Value Ref Range    WBC 6.1 4.3 - 11.1 K/uL    RBC 3.01 (L) 4.05 - 5.2 M/uL    HGB 9.0 (L) 11.7 - 15.4 g/dL    HCT 29.5 (L) 35.8 - 46.3 %    MCV 98.0 (H) 79.6 - 97.8 FL    MCH 29.9 26.1 - 32.9 PG    MCHC 30.5 (L) 31.4 - 35.0 g/dL    RDW 15.4 (H) 11.9 - 14.6 %    PLATELET 891 752 - 037 K/uL    MPV 9.4 9.4 - 12.3 FL    ABSOLUTE NRBC 0.00 0.0 - 0.2 K/uL   METABOLIC PANEL, BASIC    Collection Time: 03/25/22  6:12 AM   Result Value Ref Range    Sodium 140 136 - 145 mmol/L    Potassium 3.6 3.5 - 5.1 mmol/L    Chloride 105 98 - 107 mmol/L    CO2 30 21 - 32 mmol/L    Anion gap 5 (L) 7 - 16 mmol/L    Glucose 92 65 - 100 mg/dL    BUN 16 8 - 23 MG/DL    Creatinine 0.60 0.6 - 1.0 MG/DL    GFR est AA >60 >60 ml/min/1.73m2    GFR est non-AA >60 >60 ml/min/1.73m2    Calcium 9.0 8.3 - 10.4 MG/DL   NT-PRO BNP    Collection Time: 03/25/22  6:12 AM   Result Value Ref Range    NT pro-BNP 2,917 (H) 5 - 125 PG/ML       Assessment:     Problem List as of 3/25/2022 Date Reviewed: 3/21/2022          Codes Class Noted - Resolved    Chest pain ICD-10-CM: R07.9  ICD-9-CM: 786.50  5/31/2019 - Present        * (Principal) SDH (subdural hematoma) ([facility] ICD-10-CM: A49.5B1I  ICD-9-CM: 432.1  3/18/2022 - Present        Post-traumatic subdural hematoma ([facility] ICD-10-CM: V18.9N9K  ICD-9-CM: 852.20  3/15/2022 - Present        Hypokalemic alkalosis ICD-10-CM: E87.3  ICD-9-CM: 276.3  3/15/2022 - Present        EKG, abnormal ICD-10-CM: R94.31  ICD-9-CM: 794.31  2/15/2022 - Present        Femur fracture, right ([facility] ICD-10-CM: F82.23ZU  ICD-9-CM: 821.00  2/11/2022 - Present        COPD with acute lower respiratory infection ([facility] ICD-10-CM: J44.0  ICD-9-CM: 496, 519.8  2/10/2022 - Present        Moderate to severe mitral regurgitation ICD-10-CM: I34.0  ICD-9-CM: 424.0  2/8/2022 - Present        Iron deficiency anemia due to chronic blood loss (Chronic) ICD-10-CM: D50.0  ICD-9-CM: 280.0  2/8/2022 - Present        Closed right hip fracture ([facility] ICD-10-CM: S72.001A  ICD-9-CM: 820.8  2/4/2022 - Present        Other fracture of right femur, initial encounter for closed fracture ([facility] [address] ICD-10-CM: W66.5P7H  ICD-9-CM: 821.00  2/4/2022 - Present        Multiple closed anterior-posterior compression fractures of pelvis ([facility] [address] ICD-10-CM: C42.88FP  ICD-9-CM: 808.44  2/2/2022 - Present        DDD (degenerative disc disease), lumbar ICD-10-CM: M51.36  ICD-9-CM: 722.52  2/2/2022 - Present        DNR (do not resuscitate) ICD-10-CM: Z66  ICD-9-CM: V49.86  11/9/2021 - Present    Overview Signed 11/9/2021 12:25 PM by Josefina Richardson NP     POST form completed - DNR but full treatment, including intubation. No TRACH. No PEG. Acute bilateral low back pain without sciatica ICD-10-CM: M54.50  ICD-9-CM: 724.2, 338.19  11/9/2021 - Present        Palliative care patient ICD-10-CM: Z51.5  ICD-9-CM: V66.7  7/14/2021 - Present        Depression ICD-10-CM: F32. A  ICD-9-CM: 048  5/6/2021 - Present        Anxiety ICD-10-CM: F41.9  ICD-9-CM: 300.00  5/6/2021 - Present        Ischemic heart disease, hx pci, cath/pci last 5/2019 ICD-10-CM: I25.9  ICD-9-CM: 414.9  2/24/2021 - Present        H/O carotid endarterectomy ICD-10-CM: Z98.890  ICD-9-CM: V45.89  9/21/2020 - Present        Right ear pain ICD-10-CM: H92.01  ICD-9-CM: 388.70  9/21/2020 - Present        Pulmonary mass ICD-10-CM: R91.8  ICD-9-CM: 786.6  2/11/2020 - Present        Centrilobular emphysema ([address] ICD-10-CM: J43.2  ICD-9-CM: 492.8  12/20/2019 - Present        Carotid stenosis ICD-10-CM: I65.29  ICD-9-CM: 433.10  12/3/2019 - Present        Carotid stenosis, right ICD-10-CM: I65.21  ICD-9-CM: 433.10  12/3/2019 - Present        Hypertensive urgency ICD-10-CM: I16.0  ICD-9-CM: 401.9  6/8/2019 - Present        Chronic respiratory failure with hypoxia (HCC) (Chronic) ICD-10-CM: J96.11  ICD-9-CM: 518.83, 799.02  6/8/2019 - Present        CAD S/P percutaneous coronary angioplasty ICD-10-CM: I25.10, Z98.61  ICD-9-CM: 414.01, V45.82  5/31/2019 - Present        Ventricular ectopy ICD-10-CM: I49.3  ICD-9-CM: 427.69  5/9/2017 - Present        Chronic anxiety ICD-10-CM: F41.9  ICD-9-CM: 300.00  4/27/2017 - Present        Hyperlipidemia (Chronic) ICD-10-CM: E78.5  ICD-9-CM: 272.4  10/10/2016 - Present        Essential hypertension, benign (Chronic) ICD-10-CM: I10  ICD-9-CM: 401.1  10/10/2016 - Present        Coronary artery disease involving native coronary artery of native heart without angina pectoris ICD-10-CM: I25.10  ICD-9-CM: 414.01  10/10/2016 - Present        Hypoxemia ICD-10-CM: R09.02  ICD-9-CM: 799.02  8/25/2013 - Present Overview Signed 2/5/2014  9:08 AM by Amber Lobo NP     Jyotiwarner Jones 8/2013. Pt had over 2 hours 1/2 hours of desaturations at night. O2 was ordered for her to start at 2L in September, but she stated she was feeling better than when FILIBERTO was done and refused O2. FILIBERTO 12/2013: not performed secondary to patient factors. Acute respiratory failure with hypoxia (HCC) ICD-10-CM: J96.01  ICD-9-CM: 518.81  8/22/2013 - Present        COPD, very severe (HCC) (Chronic) ICD-10-CM: J44.9  ICD-9-CM: 496  8/20/2013 - Present        Personal history of tobacco use ICD-10-CM: Z18.032  ICD-9-CM: V15.82  8/19/2013 - Present        History of MI (myocardial infarction) ICD-10-CM: I25.2  ICD-9-CM: 346  8/19/2013 - Present    Overview Addendum 8/30/2018  9:20 AM by Washington Patel MD     STEMI 8/19/13:  Angioplasty for in-stent stenosis to LAD             RESOLVED: Chronic obstructive pulmonary disease ([facility] [address] ICD-10-CM: J44.9  ICD-9-CM: 496  10/10/2016 - 8/30/2018        RESOLVED: Acute systolic heart failure ([facility] [address] ICD-10-CM: I50.21  ICD-9-CM: 428.21  8/22/2013 - 8/30/2018        RESOLVED: Pulmonary hemorrhage ICD-10-CM: R04.89  ICD-9-CM: 786.30  8/20/2013 - 6/25/2019        RESOLVED: CAD (coronary artery disease) (Chronic) ICD-10-CM: I25.10  ICD-9-CM: 414.00  8/19/2013 - 8/3/2021        RESOLVED: COPD (chronic obstructive pulmonary disease) (HCC) (Chronic) ICD-10-CM: J44.9  ICD-9-CM: 496  8/19/2013 - 8/20/2013            S/p traumatic subdural hematoma     Plan / Recommendations / Medical Decision Making:     Continue daily physician / PA medical management:    Subdural hematoma - PT/OT; good rehab potential.     S/p right IT fracture 2/2022 now s/p fall with fx of the lesser trochanter; WBAT. No surgery required, per Dr Dick Morejon.     Hyperlipidemia - continue Lipitor and Zetia.     COPD - O2- and steroid-dependent, 1-2 L O2. Continue Symbicort, Spiriva and prednisone 15mg. Encourage IS.  She is on prophylactic azithromycin 500mg MWF for inflammation due to COPD / emphysema. There was concern during hospitalization 2/2022 of pulmonary malignancy with a 2.8cm left perihilar lower lobe spiculated mass per CT chest 2/26/2022.  -3/22. Stable on 0.5-2.0 L O2 via NC. Lungs clear.  -3/23. Sats. 95-98% on 2 L O2 via NC.  -3/24. Sats. 95-96% on RA. O2 PRN. -3/25. Sats. % on 1-2 L of O2.      Hx of MI - s/p stent to LAD and distal RCA 2/7/2022; continue statin, was on Effient at home. Plans to restart ASA and begin Plavix; will wait 2wks post SDH; this was a very small bleed. ASA 81mg restarted on [facility] admission; monitor clinically. Seen by Dr Bear Vaca 3/16.  -3/21 per Cardiology, Sidney Hamilton I would like to start back dual antiplatelet therapy (using aspirin and clopidogrel) in 3 to 4 days if possible\"; per Neurosurgery, okay to start Plavix  -3/24. ASA/Plavix causing challenges with skin. Continue to monitor. --3/25. Bout of CP this am controlled with Nitro. Doing well.     Hypertension - BP fluctuating, managed medically. Continue Toprol XL, PRN SL NTG.  -3/20 HR 72, /74  -3/21 /53 this AM then 168/76 with HA, CP; HR consistently in 70s; monitor during therapy  -3/22. BP fluctuating. /. No dizziness or orthostasis. Increasing LE edema. Resume Torsemide 10 mg PO every day. Compression garments.   -3/23. BP stable a t 114-148/50-56. Toprol XL held this am. Continue to monitor. Continue Torsemide and compression garments.  -3/24. /62 today. Continue current regimen.  -3/25. -161/56-69. Continue current regimen. Anemia - Hgb 9.3 and stable.  -3/21 Hgb 8.3, drifting down; recheck in 2d  -3/22. Check CBC on 3/25. Start B Complex. -3/25. Hgb 9.0 and Hct 29.5. Progressing in a positive direction     Hypokalemia - resolved; monitor and replace if needed. -3/21 K+ 4.1 today.  -3/25. BMP/BNP scheduled for 3/25.  -3/25. Electrolytes stable. BNP improving. CHF. BNP markedly elevated at 4033.  -3/22.  Resume Torsemide 10 mg PO every day. -Check BMP/BNP on 3/25.  -3/25. BNP 2917 and trending downward. Continue Torsemide. Osteoporosis. Start Calcium/Vitamin D every day. Consider the addition of medication as an outpatient in addition to resistance training and vibration plate therapy. Pain management - fairly recent right hip fracture. Will require regular pain assessment and comprehensive pain management. PRN oxycodone and APAP; history of T12-L1 compression fractures. -3/23. Good pain control.  -3/25. No pain issues.     Pneumonia prophylaxis - incentive spirometer every hour while awake.     DVT risk / DVT prophylaxis - daily physician / PA exam to assess as patient is at increased risk for of thromboembolism. Mobilize as tolerated. Sequential pneumatic compression devices (SCDs) when in bed; thigh-high or knee-high thromboembolic deterrent hose when out of bed. No anticoagulation due to SDH.  -3/21 on ASA since 3/19; okay to start Plavix per NSG     GERD - continue Protonix BID. At times may need additional antacids, Maalox prn. Depression - continue on Lexapro. At risk of depression exacerbation due to re-hospitalization and decline in functional mobility and independence. Resume PRN Ativan for anxiety.     General skin care / wound prevention - monitor general skin status daily per staff and physician / PA. At risk for failure due to impaired mobility. Patient with multiple bruises and friable skin.     Bladder program - voiding without difficulty.     Bowel program - at risk for constipation as a side effect of opioids, other medications, impaired mobility, etc. MiraLAX daily for regularity, Marisela-Colace for stool softener. PRN MOM, bisacodyl suppository or tablets for constipation.     Hyperthyroidism - continue Tapazole. D/C IV from [facility]. Time spent was 25 minutes with over 1/2 in direct patient care/examination, consultation and coordination of care.      Signed By: Giovanni Villalpando MD    March 25, 2022

## 2022-03-25 NOTE — PROGRESS NOTES
PT WEEKLY PROGRESS NOTE   Time In: 7186   Time Out: 1519    Subjective: patient reporting she is tired from all of the therapy this AM. Reports she feels congested with tight chest. Reports she feels like she is coming down with something. Objective: Vital Signs:02 at 1 lpm, 02 sat 93 to 99% and HR 77 to 87 during treatment  Patient Vitals for the past 12 hrs:   Temp Pulse Resp BP SpO2   03/25/22 1606     97 %   03/25/22 1519 97.2 °F (36.2 °C) 73 18 (!) 145/58 96 %   03/25/22 0827   18 112/62 94 %   03/25/22 0819    (!) 161/69    03/25/22 0758 97.9 °F (36.6 °C) 72 16 (!) 108/56 100 %     Pain level:3 out of 10  Pain location:right hip  Pain interventions:Medication per order, rest, positioning,relaxation    Patient education:Bed mobility training,transfer training, gait training, balance training,fall precautions, body mechanics,activity pacing, energy conservation, Patient verbalizing understanding and demonstrating understanding of patient education. Recommend follow up education. Interdisciplinary Communication:spoke with OT regarding progress towards goals and D/C plans      Other (comment) (Fall precautions, WBAT RLE)    Outcome Measures:     Cognition: Alert, able to follow commands,cooperating,participating, motivated, anxiety during mobility improving. BED/CHAIR/WHEELCHAIR TRANSFERS Initial Assessment Weekly Progress Assessment 3/25/2022   Rolling Right 5 (Supervision) 6 (Modified independent)   Rolling Left 5 (Supervision) 6 (Modified independent)   Supine to Sit 5 (Supervision) 6 (Modified independent)   Sit to Stand Contact guard assistance Supervision   Sit to Supine 5 (Supervision) 6 (Modified independent)   Transfer Type SPT with walker SPT with walker   Comments Increased time and effort to complete bed mobility and transfers.  Requires time to sit on side of bed after transitioning to sitting   Increased time and effort to complete with cues for body mechanics     Car Transfer Not tested Not tested   Car Type         GROSS ASSESSMENT Weekly Progress Assessment 3/25/2022   AROM  LE generally decreased, functional   Strength  RIght hip -3/5 knee and ankle 5/5. LLE hip knee and ankle 4/5 to 5/5   Coordination  LE gross motor intact, fine motor impaired. Tone  LE normal   Sensation  LE intact     POSTURE Weekly Progress Assessment 3/25/2022   Posture (WDL) Exceptions to WDL   Posture Assessment Forward head;Rounded shoulders;Kyphosis     WHEELCHAIR MOBILITY/MANAGEMENT Initial Assessment Weekly Progress Assessment 3/25/2022   Able to Propel    NA   Curbs/ramps assistance required 0 (Not tested) 0 (Not tested)   Wheelchair set up assistance required 0 (Not tested)  NA   Wheelchair management    NA     WALKING INDEPENDENCE Initial Assessment Weekly Progress Assessment 3/25/2022   Assistive device Walker, rolling,Gait belt Walker, rolling   Ambulation assistance - level surface 4 (Contact guard assistance)  SBA to supervision    Distance 60 Feet (ft) 40 Feet (ft)   Comments slow cont step through gait pattern with mild decrease in step length. SLower pace to control RR and 02 sat One time cue for controlling gait speed with RR. Patient walking 20 ft then making 180 degree turn with patient managing 02 line     GAIT Weekly Progress Assessment 3/25/2022   Gait Description (WDL)  slow cont step through gait pattern controlling gait speed on purpose in order to control gait speed   Gait Abnormalities  No significant deviations ambulating with RW     STEPS/STAIRS Initial Assessment Weekly Progress Assessment 3/25/2022   Steps/Stairs ambulated 0 1 (3 inch step )   Rail Use  (with RW)  (with RW)   Comments Unable to ambulate up/down steps due to fatigue  Increased time and effort complete   Curbs/Ramps 0 (Not tested)  NT          Assessment: Patient progressing towards LTGs. Strength and endurance improving.02 sat and HR stable on 02 at 1 lpm during functional mobility.  Patient progressing towards modified independent functional level. Patient has potential to meet LTGs by discharge date. Recommend  follow up [facility] PT after discharge. Patient returned to room at end of treatment and remained up in recliner with LEs elevated and with needs in reach. 02 at 1 lpm    Plan of Care: Weekly assessment completed. Goals updated and revised. Please see Care Plan for updated LTGs.       Family Training:  completed during previous recent admit    lAicia Christianson, PT  3/25/2022

## 2022-03-26 PROCEDURE — 94760 N-INVAS EAR/PLS OXIMETRY 1: CPT

## 2022-03-26 PROCEDURE — 77010033678 HC OXYGEN DAILY

## 2022-03-26 PROCEDURE — 65310000000 HC RM PRIVATE REHAB

## 2022-03-26 PROCEDURE — 99232 SBSQ HOSP IP/OBS MODERATE 35: CPT | Performed by: PHYSICAL MEDICINE & REHABILITATION

## 2022-03-26 PROCEDURE — 74011250637 HC RX REV CODE- 250/637: Performed by: PHYSICAL MEDICINE & REHABILITATION

## 2022-03-26 PROCEDURE — 3331090001 HH PPS REVENUE CREDIT

## 2022-03-26 PROCEDURE — 3331090002 HH PPS REVENUE DEBIT

## 2022-03-26 PROCEDURE — 97530 THERAPEUTIC ACTIVITIES: CPT

## 2022-03-26 PROCEDURE — 94640 AIRWAY INHALATION TREATMENT: CPT

## 2022-03-26 PROCEDURE — 2709999900 HC NON-CHARGEABLE SUPPLY

## 2022-03-26 PROCEDURE — 97116 GAIT TRAINING THERAPY: CPT

## 2022-03-26 PROCEDURE — 97110 THERAPEUTIC EXERCISES: CPT

## 2022-03-26 PROCEDURE — 74011250637 HC RX REV CODE- 250/637: Performed by: PHYSICIAN ASSISTANT

## 2022-03-26 PROCEDURE — 74011636637 HC RX REV CODE- 636/637: Performed by: PHYSICAL MEDICINE & REHABILITATION

## 2022-03-26 RX ORDER — CETIRIZINE HCL 10 MG
5 TABLET ORAL DAILY
Status: DISCONTINUED | OUTPATIENT
Start: 2022-03-26 | End: 2022-03-29

## 2022-03-26 RX ADMIN — OXYCODONE HYDROCHLORIDE 5 MG: 5 TABLET ORAL at 22:33

## 2022-03-26 RX ADMIN — OXYCODONE HYDROCHLORIDE 5 MG: 5 TABLET ORAL at 10:01

## 2022-03-26 RX ADMIN — BUDESONIDE AND FORMOTEROL FUMARATE DIHYDRATE 2 PUFF: 160; 4.5 AEROSOL RESPIRATORY (INHALATION) at 19:20

## 2022-03-26 RX ADMIN — TIOTROPIUM BROMIDE INHALATION SPRAY 2 PUFF: 3.12 SPRAY, METERED RESPIRATORY (INHALATION) at 08:31

## 2022-03-26 RX ADMIN — LORAZEPAM 0.5 MG: 0.5 TABLET ORAL at 22:33

## 2022-03-26 RX ADMIN — ASPIRIN 81 MG: 81 TABLET, CHEWABLE ORAL at 08:04

## 2022-03-26 RX ADMIN — BUDESONIDE AND FORMOTEROL FUMARATE DIHYDRATE 2 PUFF: 160; 4.5 AEROSOL RESPIRATORY (INHALATION) at 08:29

## 2022-03-26 RX ADMIN — GUAIFENESIN 600 MG: 600 TABLET ORAL at 21:50

## 2022-03-26 RX ADMIN — CLOPIDOGREL BISULFATE 75 MG: 75 TABLET ORAL at 08:03

## 2022-03-26 RX ADMIN — LORAZEPAM 0.5 MG: 0.5 TABLET ORAL at 02:58

## 2022-03-26 RX ADMIN — OXYCODONE HYDROCHLORIDE 5 MG: 5 TABLET ORAL at 18:35

## 2022-03-26 RX ADMIN — TORSEMIDE 10 MG: 20 TABLET ORAL at 08:02

## 2022-03-26 RX ADMIN — METOPROLOL SUCCINATE 100 MG: 50 TABLET, EXTENDED RELEASE ORAL at 08:04

## 2022-03-26 RX ADMIN — ESCITALOPRAM OXALATE 10 MG: 10 TABLET ORAL at 08:02

## 2022-03-26 RX ADMIN — ATORVASTATIN CALCIUM 80 MG: 80 TABLET, FILM COATED ORAL at 21:50

## 2022-03-26 RX ADMIN — EZETIMIBE 10 MG: 10 TABLET ORAL at 08:03

## 2022-03-26 RX ADMIN — PANTOPRAZOLE SODIUM 40 MG: 40 TABLET, DELAYED RELEASE ORAL at 06:30

## 2022-03-26 RX ADMIN — ACETAMINOPHEN 650 MG: 325 TABLET ORAL at 15:20

## 2022-03-26 RX ADMIN — ZINC 1 TABLET: TAB ORAL at 08:04

## 2022-03-26 RX ADMIN — ACETAMINOPHEN 650 MG: 325 TABLET ORAL at 21:53

## 2022-03-26 RX ADMIN — ACETAMINOPHEN 650 MG: 325 TABLET ORAL at 08:10

## 2022-03-26 RX ADMIN — PANTOPRAZOLE SODIUM 40 MG: 40 TABLET, DELAYED RELEASE ORAL at 16:17

## 2022-03-26 RX ADMIN — GABAPENTIN 100 MG: 100 CAPSULE ORAL at 08:04

## 2022-03-26 RX ADMIN — METHIMAZOLE 10 MG: 5 TABLET ORAL at 08:04

## 2022-03-26 RX ADMIN — GABAPENTIN 100 MG: 100 CAPSULE ORAL at 16:17

## 2022-03-26 RX ADMIN — GUAIFENESIN 600 MG: 600 TABLET ORAL at 08:04

## 2022-03-26 RX ADMIN — POLYETHYLENE GLYCOL 3350 17 G: 17 POWDER, FOR SOLUTION ORAL at 08:04

## 2022-03-26 RX ADMIN — CETIRIZINE HYDROCHLORIDE 5 MG: 10 TABLET, FILM COATED ORAL at 12:50

## 2022-03-26 RX ADMIN — CALCIUM CARBONATE-CHOLECALCIFEROL TAB 250 MG-125 UNIT 1 TABLET: 250-125 TAB at 08:04

## 2022-03-26 RX ADMIN — PREDNISONE 15 MG: 10 TABLET ORAL at 08:03

## 2022-03-26 RX ADMIN — GABAPENTIN 100 MG: 100 CAPSULE ORAL at 21:50

## 2022-03-26 NOTE — PROGRESS NOTES
Kiran Quezada MD  Medical Director  [facility], [address] [facility]  Tel: [phone] Jem Ansari PROGRESS NOTE    Braulio Rivas [facility]  Admit Date: 3/18/2022  Admit Diagnosis:   SDH (subdural hematoma) ([facility] [address] [S06.5X9A]    Subjective     Patient seen and examined. Ambulating in room during therapy. Feels better today. Denies lightheadedness , SOB or nausea. Requesting zyrtec for seasonal allergies.      Objective:     Current Facility-Administered Medications   Medication Dose Route Frequency    multivitamin w ZN (STRESSTABS W ZINC) tablet  1 Tablet Oral DAILY    calcium-vitamin D (OS-DOMINGO +D3) 250 mg-125 unit per tablet 1 Tablet  1 Tablet Oral DAILY    torsemide (DEMADEX) tablet 10 mg  10 mg Oral DAILY    clopidogreL (PLAVIX) tablet 75 mg  75 mg Oral DAILY    azithromycin (ZITHROMAX) tablet 500 mg  500 mg Oral Q MON, WED & FRI    budesonide-formoteroL (SYMBICORT) 160-4.5 mcg/actuation HFA inhaler 2 Puff  2 Puff Inhalation BID RT    escitalopram oxalate (LEXAPRO) tablet 10 mg  10 mg Oral DAILY    ezetimibe (ZETIA) tablet 10 mg  10 mg Oral DAILY    gabapentin (NEURONTIN) capsule 100 mg  100 mg Oral TID    LORazepam (ATIVAN) tablet 0.5 mg  0.5 mg Oral Q8H PRN    methIMAzole (TAPAZOLE) tablet 10 mg  10 mg Oral DAILY    metoprolol succinate (TOPROL-XL) XL tablet 100 mg  100 mg Oral DAILY    nitroglycerin (NITROSTAT) tablet 0.4 mg  0.4 mg SubLINGual PRN    ondansetron (ZOFRAN ODT) tablet 4 mg  4 mg Oral Q6H PRN    oxyCODONE IR (ROXICODONE) tablet 5 mg  5 mg Oral Q4H PRN    pantoprazole (PROTONIX) tablet 40 mg  40 mg Oral ACB&D    polyethylene glycol (MIRALAX) packet 17 g  17 g Oral DAILY    predniSONE (DELTASONE) tablet 15 mg  15 mg Oral DAILY WITH BREAKFAST    tiotropium bromide (SPIRIVA RESPIMAT) 2.5 mcg /actuation  2 Puff Inhalation DAILY    traZODone (DESYREL) tablet 50 mg  50 mg Oral QHS PRN    acetaminophen (TYLENOL) tablet 650 mg  650 mg Oral Q4H PRN    atorvastatin (LIPITOR) tablet 80 mg  80 mg Oral QHS    bisacodyL (DULCOLAX) tablet 5 mg  5 mg Oral DAILY PRN    senna-docusate (PERICOLACE) 8.6-50 mg per tablet 2 Tablet  2 Tablet Oral QHS    guaiFENesin ER (MUCINEX) tablet 600 mg  600 mg Oral Q12H    aspirin chewable tablet 81 mg  81 mg Oral DAILY       Review of Systems:   Denies cough, visual problems, abdominal pain, dysuria, calf pain. Pertinent positives are as noted in the HPI, ROS unremarkable otherwise. Visit Vitals  /60 (BP 1 Location: Right lower arm, BP Patient Position: Sitting)   Pulse 74   Temp 97.8 °F (36.6 °C)   Resp 18   Wt 59.6 kg (131 lb 6.4 oz)   SpO2 97%   BMI 19.98 kg/m²        Physical Exam:   General: Alert, oriented x 4. OOB and ambulating today with PT. HEENT: Normocephalic. Oral mucosa moist.   Lungs: CTA without R/R/R. Respirations unlabored. Heart: Regular rate and rhythm, no appreciable murmur. Abdomen: Soft, non-tender, not distended. Bowel sounds normoactive        Neuromuscular:        PERRLA, EOM intact, visual fields full to finger confrontation. No gross focal neuro deficits. UE strength at biceps, triceps and  4+/5 and symmetric bilaterally. LE strength at HF 4-/5 (R), 4/5 (L), distally 4+/5 and symmetric s/p right IT fracture. Sensation intact distally. Skin/extremity: No rashes, no erythema. Calves soft, non-tender BLE. Multiple resolving ecchymoses to UE, R>>L. Trace edema today.        Functional Assessment:  Balance  Sitting - Static: Good (unsupported) (03/26/22 1200)  Sitting - Dynamic: Good (unsupported) (03/26/22 1200)  Standing - Static: Fair (with RW) (03/26/22 1200)  Standing - Dynamic : Impaired (03/26/22 1200)       Marcelle Cisse Fall Risk Assessment:  Marcelle Cisse Fall Risk  Mobility: Ambulates or transfers with assist devices or assistance (03/26/22 2984)  Mobility Interventions: Patient to call before getting OOB;Utilize walker, cane, or other assistive device (03/26/22 0735)  Mentation: Alert, oriented x 3 (03/26/22 0735)  Medication: Patient receiving anticonvulsants, sedatives(tranquilizers), psychotropics or hypnotics, hypoglycemics, narcotics, sleep aids, antihypertensives, laxatives, or diuretics (03/26/22 0735)  Medication Interventions: Patient to call before getting OOB (03/26/22 0735)  Elimination: Needs assistance with toileting (03/26/22 0735)  Elimination Interventions: Call light in reach; Patient to call for help with toileting needs; Toileting schedule/hourly rounds (03/26/22 0735)  Prior Fall History: Before admission in past 12 months _home or previous inpatient care) (03/26/22 0735)  History of Falls Interventions: Consult care management for discharge planning (03/26/22 0735)  Total Score: 4 (03/26/22 0735)  Standard Fall Precautions: Yes (03/23/22 2010)  High Fall Risk: Yes (03/26/22 0735)     Ambulation:  Gait  Distance (ft): 140 Feet (ft) (03/26/22 1200)  Assistive Device: Walker, rolling (03/26/22 1200)  Rail Use:  (with RW) (03/25/22 1600)     Labs/Studies:  Recent Results (from the past 72 hour(s))   CBC W/O DIFF    Collection Time: 03/25/22  6:12 AM   Result Value Ref Range    WBC 6.1 4.3 - 11.1 K/uL    RBC 3.01 (L) 4.05 - 5.2 M/uL    HGB 9.0 (L) 11.7 - 15.4 g/dL    HCT 29.5 (L) 35.8 - 46.3 %    MCV 98.0 (H) 79.6 - 97.8 FL    MCH 29.9 26.1 - 32.9 PG    MCHC 30.5 (L) 31.4 - 35.0 g/dL    RDW 15.4 (H) 11.9 - 14.6 %    PLATELET 671 135 - 958 K/uL    MPV 9.4 9.4 - 12.3 FL    ABSOLUTE NRBC 0.00 0.0 - 0.2 K/uL   METABOLIC PANEL, BASIC    Collection Time: 03/25/22  6:12 AM   Result Value Ref Range    Sodium 140 136 - 145 mmol/L    Potassium 3.6 3.5 - 5.1 mmol/L    Chloride 105 98 - 107 mmol/L    CO2 30 21 - 32 mmol/L    Anion gap 5 (L) 7 - 16 mmol/L    Glucose 92 65 - 100 mg/dL    BUN 16 8 - 23 MG/DL    Creatinine 0.60 0.6 - 1.0 MG/DL    GFR est AA >60 >60 ml/min/1.73m2    GFR est non-AA >60 >60 ml/min/1.73m2    Calcium 9.0 8.3 - 10.4 MG/DL   NT-PRO BNP    Collection Time: 03/25/22  6:12 AM   Result Value Ref Range    NT pro-BNP 2,917 (H) 5 - 125 PG/ML       Assessment:     Problem List as of 3/26/2022 Date Reviewed: 3/21/2022          Codes Class Noted - Resolved    Chest pain ICD-10-CM: R07.9  ICD-9-CM: 786.50  5/31/2019 - Present        * (Principal) SDH (subdural hematoma) ([place] [address] ICD-10-CM: V00.5E1D  ICD-9-CM: 432.1  3/18/2022 - Present        Post-traumatic subdural hematoma ([place] [address] ICD-10-CM: P53.6E3U  ICD-9-CM: 852.20  3/15/2022 - Present        Hypokalemic alkalosis ICD-10-CM: E87.3  ICD-9-CM: 276.3  3/15/2022 - Present        EKG, abnormal ICD-10-CM: R94.31  ICD-9-CM: 794.31  2/15/2022 - Present        Femur fracture, right ([place] [address] ICD-10-CM: S72.91XA  ICD-9-CM: 821.00  2/11/2022 - Present        COPD with acute lower respiratory infection ([place] [address] ICD-10-CM: J44.0  ICD-9-CM: 496, 519.8  2/10/2022 - Present        Moderate to severe mitral regurgitation ICD-10-CM: I34.0  ICD-9-CM: 424.0  2/8/2022 - Present        Iron deficiency anemia due to chronic blood loss (Chronic) ICD-10-CM: D50.0  ICD-9-CM: 280.0  2/8/2022 - Present        Closed right hip fracture ([facility] ICD-10-CM: S72.001A  ICD-9-CM: 820.8  2/4/2022 - Present        Other fracture of right femur, initial encounter for closed fracture ([facility] ICD-10-CM: M77.3Z8M  ICD-9-CM: 821.00  2/4/2022 - Present        Multiple closed anterior-posterior compression fractures of pelvis ([address] ICD-10-CM: B60.29VM  ICD-9-CM: 808.44  2/2/2022 - Present        DDD (degenerative disc disease), lumbar ICD-10-CM: M51.36  ICD-9-CM: 722.52  2/2/2022 - Present        DNR (do not resuscitate) ICD-10-CM: Z66  ICD-9-CM: V49.86  11/9/2021 - Present    Overview Signed 11/9/2021 12:25 PM by Kay Aguiar NP     POST form completed - DNR but full treatment, including intubation. No TRACH. No PEG.               Acute bilateral low back pain without sciatica ICD-10-CM: M54.50  ICD-9-CM: 724.2, 338.19  11/9/2021 - Present        Palliative care patient ICD-10-CM: Z51.5  ICD-9-CM: V66.7  7/14/2021 - Present        Depression ICD-10-CM: F32. A  ICD-9-CM: 796  5/6/2021 - Present        Anxiety ICD-10-CM: F41.9  ICD-9-CM: 300.00  5/6/2021 - Present        Ischemic heart disease, hx pci, cath/pci last 5/2019 ICD-10-CM: I25.9  ICD-9-CM: 414.9  2/24/2021 - Present        H/O carotid endarterectomy ICD-10-CM: Z98.890  ICD-9-CM: V45.89  9/21/2020 - Present        Right ear pain ICD-10-CM: H92.01  ICD-9-CM: 388.70  9/21/2020 - Present        Pulmonary mass ICD-10-CM: R91.8  ICD-9-CM: 786.6  2/11/2020 - Present        Centrilobular emphysema ([address] ICD-10-CM: J43.2  ICD-9-CM: 492.8  12/20/2019 - Present        Carotid stenosis ICD-10-CM: I65.29  ICD-9-CM: 433.10  12/3/2019 - Present        Carotid stenosis, right ICD-10-CM: I65.21  ICD-9-CM: 433.10  12/3/2019 - Present        Hypertensive urgency ICD-10-CM: I16.0  ICD-9-CM: 401.9  6/8/2019 - Present        Chronic respiratory failure with hypoxia (HCC) (Chronic) ICD-10-CM: J96.11  ICD-9-CM: 518.83, 799.02  6/8/2019 - Present        CAD S/P percutaneous coronary angioplasty ICD-10-CM: I25.10, Z98.61  ICD-9-CM: 414.01, V45.82  5/31/2019 - Present        Ventricular ectopy ICD-10-CM: I49.3  ICD-9-CM: 427.69  5/9/2017 - Present        Chronic anxiety ICD-10-CM: F41.9  ICD-9-CM: 300.00  4/27/2017 - Present        Hyperlipidemia (Chronic) ICD-10-CM: E78.5  ICD-9-CM: 272.4  10/10/2016 - Present        Essential hypertension, benign (Chronic) ICD-10-CM: I10  ICD-9-CM: 401.1  10/10/2016 - Present        Coronary artery disease involving native coronary artery of native heart without angina pectoris ICD-10-CM: I25.10  ICD-9-CM: 414.01  10/10/2016 - Present        Hypoxemia ICD-10-CM: R09.02  ICD-9-CM: 799.02  8/25/2013 - Present    Overview Signed 2/5/2014  9:08 AM by Abdon Underwood NP     FILIBERTO 8/2013. Pt had over 2 hours 1/2 hours of desaturations at night.  O2 was ordered for her to start at 2L in September, but she stated she was feeling better than when FILIBERTO was done and refused O2. FILIBERTO 12/2013: not performed secondary to patient factors. Acute respiratory failure with hypoxia (HCC) ICD-10-CM: J96.01  ICD-9-CM: 518.81  8/22/2013 - Present        COPD, very severe (HCC) (Chronic) ICD-10-CM: J44.9  ICD-9-CM: 496  8/20/2013 - Present        Personal history of tobacco use ICD-10-CM: N25.776  ICD-9-CM: V15.82  8/19/2013 - Present        History of MI (myocardial infarction) ICD-10-CM: I25.2  ICD-9-CM: 066  8/19/2013 - Present    Overview Addendum 8/30/2018  9:20 AM by Coleman Fabry, MD     STEMI 8/19/13:  Angioplasty for in-stent stenosis to LAD             RESOLVED: Chronic obstructive pulmonary disease ([facility] ICD-10-CM: J44.9  ICD-9-CM: 496  10/10/2016 - 8/30/2018        RESOLVED: Acute systolic heart failure ([facility] ICD-10-CM: I50.21  ICD-9-CM: 428.21  8/22/2013 - 8/30/2018        RESOLVED: Pulmonary hemorrhage ICD-10-CM: R04.89  ICD-9-CM: 786.30  8/20/2013 - 6/25/2019        RESOLVED: CAD (coronary artery disease) (Chronic) ICD-10-CM: I25.10  ICD-9-CM: 414.00  8/19/2013 - 8/3/2021        RESOLVED: COPD (chronic obstructive pulmonary disease) (HCC) (Chronic) ICD-10-CM: J44.9  ICD-9-CM: 496  8/19/2013 - 8/20/2013            S/p traumatic subdural hematoma     Plan / Recommendations / Medical Decision Making:     Continue daily physician / PA medical management:    Subdural hematoma - PT/OT; good rehab potential.     S/p right IT fracture 2/2022 now s/p fall with fx of the lesser trochanter; WBAT. No surgery required, per Dr Mauro Gamez.     Hyperlipidemia - continue Lipitor and Zetia.     COPD - O2- and steroid-dependent, 1-2 L O2. Continue Symbicort, Spiriva and prednisone 15mg. Encourage IS. She is on prophylactic azithromycin 500mg MWF for inflammation due to COPD / emphysema.  There was concern during hospitalization 2/2022 of pulmonary malignancy with a 2.8cm left perihilar lower lobe spiculated mass per CT chest 2/26/2022.  -3/26 Low to upper 90s on 1-2 L of O2.      Hx of MI - s/p stent to LAD and distal RCA 2/7/2022; continue statin, was on Effient at home. Plans to restart ASA and begin Plavix; will wait 2wks post SDH; this was a very small bleed. ASA 81mg restarted on [facility] admission; monitor clinically. Seen by Dr Ximena Warren 3/16.  -3/21 per Cardiology, Alexey Pimentel I would like to start back dual antiplatelet therapy (using aspirin and clopidogrel) in 3 to 4 days if possible\"; per Neurosurgery, okay to start Plavix  -3/24. ASA/Plavix causing challenges with skin. Continue to monitor. --3/25. Bout of CP this am controlled with Nitro. Doing well.     Hypertension - BP fluctuating, managed medically. Continue Toprol XL, PRN SL NTG.    -3/22. BP fluctuating. /. No dizziness or orthostasis. Increasing LE edema. Resume Torsemide 10 mg PO every day. Compression garments.   -3/23. BP stable a t 114-148/50-56. Toprol XL held this am. Continue to monitor. Continue Torsemide and compression garments.  -3/26 and BP acceptable, /60    Anemia - Hgb 9.3 and stable.  -3/21 Hgb 8.3, drifting down; recheck in 2d  -3/25. Hgb 9.0 and Hct 29.5. Progressing in a positive direction, B complex begun     Hypokalemia - resolved; monitor and replace if needed. -3/21 K+ 4.1 today.  -3/25. Electrolytes stable. BNP improving. CHF. BNP markedly elevated at 4033.  -3/22. Resume Torsemide 10 mg PO every day. -3/25. BNP 2917 and trending downward. Continue Torsemide. Osteoporosis. Start Calcium/Vitamin D every day. Consider the addition of medication as an outpatient in addition to resistance training and vibration plate therapy. Pain management - fairly recent right hip fracture.  Will require regular pain assessment and comprehensive pain management. PRN oxycodone and APAP; history of T12-L1 compression fractures.     Pneumonia prophylaxis - incentive spirometer every hour while awake.     DVT risk / DVT prophylaxis - daily physician / PA exam to assess as patient is at increased risk for of thromboembolism. Mobilize as tolerated. Sequential pneumatic compression devices (SCDs) when in bed; thigh-high or knee-high thromboembolic deterrent hose when out of bed. No anticoagulation due to SDH.  -3/21 on ASA since 3/19; okay to start Plavix per NSG     GERD - continue Protonix BID. At times may need additional antacids, Maalox prn. Depression - continue on Lexapro. At risk of depression exacerbation due to re-hospitalization and decline in functional mobility and independence. Resume PRN Ativan for anxiety.     General skin care / wound prevention - monitor general skin status daily per staff and physician / PA. At risk for failure due to impaired mobility. Patient with multiple bruises and friable skin.     Bladder program - voiding without difficulty.     Bowel program - at risk for constipation as a side effect of opioids, other medications, impaired mobility, etc. MiraLAX daily for regularity, Marisela-Colace for stool softener. PRN MOM, bisacodyl suppository or tablets for constipation.     Hyperthyroidism - continue Tapazole. D/C IV from [facility]. Time spent was 25 minutes with over 1/2 in direct patient care/examination, consultation and coordination of care.      Signed By: Hank Daniels MD    March 26, 2022

## 2022-03-26 NOTE — PROGRESS NOTES
Problem: Falls - Risk of  Goal: *Absence of Falls  Description: Document Bang Santana Fall Risk and appropriate interventions in the flowsheet.   Outcome: Progressing Towards Goal  Note: Fall Risk Interventions:  Mobility Interventions: Patient to call before getting OOB,Utilize walker, cane, or other assistive device         Medication Interventions: Patient to call before getting OOB    Elimination Interventions: Call light in reach,Patient to call for help with toileting needs,Toileting schedule/hourly rounds    History of Falls Interventions: Consult care management for discharge planning         Problem: Patient Education: Go to Patient Education Activity  Goal: Patient/Family Education  Outcome: Progressing Towards Goal

## 2022-03-26 NOTE — PROGRESS NOTES
Problem: Falls - Risk of  Goal: *Absence of Falls  Description: Document Joaquín Salvador Fall Risk and appropriate interventions in the flowsheet.   Outcome: Progressing Towards Goal  Note: Fall Risk Interventions:  Mobility Interventions: Utilize walker, cane, or other assistive device         Medication Interventions: Patient to call before getting OOB    Elimination Interventions: Call light in reach    History of Falls Interventions: Door open when patient unattended         Problem: Patient Education: Go to Patient Education Activity  Goal: Patient/Family Education  Outcome: Progressing Towards Goal

## 2022-03-26 NOTE — PROGRESS NOTES
PHYSICAL THERAPY DAILY NOTE  Time In: 0323  Time Out: 1011  Patient Seen For: AM;Gait training;Patient education; Therapeutic exercise;Transfer training; Other (see progress notes)    Subjective: patient reporting she feels good this AM. Reports no chest pain this AM. Reports the breathing treatment she had yesterday help with congestion. Objective:Vital Signs:on 02 at 1 lpm, 02 sat 95% and HR 87 after ambulating 140 ft . On first attempt at standing patient c/o dizziness, Sat down and /49. After exercise sitting /58. Patient Vitals for the past 12 hrs:   Temp Pulse Resp BP SpO2   03/26/22 0832     97 %   03/26/22 0730 97.8 °F (36.6 °C) 74 18 122/60 97 %     Pain level:No c/o pain during treatment  Pain location:NA  Pain interventions:NA    Patient education:,transfer training, gait training, balance training,fall precautions, body mechanics,activity pacing,energy conservation,orthostatic hypotension symptoms. Breathing techniques during gait. Patient verbalizing understanding and demonstrating understanding of patient education. Recommend follow up education.     Interdisciplinary Communication:spoke with OT regarding progress towards goals      Other (comment) (Fall precautions, WBAT RLE)  GROSS ASSESSMENT Daily Assessment     NA       COGNITION Daily Assessment    Alert, able to follow commands,cooperating,participating, motivated, less anxiety during mobility today         BED/MAT MOBILITY Daily Assessment    Rolling Right : 0 (Not tested)  Rolling Left : 0 (Not tested)  Supine to Sit : 0 (Not tested)  Sit to Supine : 0 (Not tested)       TRANSFERS Daily Assessment   Increased time and effort to complete with cues for body mechanics   Transfer Type: SPT with walker  Transfer Assistance : 5 (Supervision/setup)  Sit to Stand Assistance: Supervision  Car Transfers: Not tested       GAIT Daily Assessment    Amount of Assistance: 5 (Stand-by assistance)  Distance (ft): 140 Feet (ft)  Assistive Device: Walker, rolling   Gait training with one time cue for controlling gait balance with RR. Gait training x 40 ft x 2 with  RW, one 180 degree turn with patient managing 02 line with one time verbal cue    STEPS or STAIRS Daily Assessment    Steps/Stairs Ambulated (#): 0  Level of Assist : 0 (Not tested)       BALANCE Daily Assessment   No loss of balance during gait or transfer training. Sitting - Static: Good (unsupported)  Sitting - Dynamic: Good (unsupported)  Standing - Static: Fair (with RW)  Standing - Dynamic : Impaired       WHEELCHAIR MOBILITY Daily Assessment    Curbs/Ramps Assist Required (FIM Score): 0 (Not tested)  Wheelchair Setup Assist Required : 0 (Not tested)       LOWER EXTREMITY EXERCISES Daily Assessment   Increased time and effort to complete with multiple and frequent rest breaks. Cues for correct form   Extremity: Both  Exercise Type #1: Seated lower extremity strengthening  Sets Performed: 2  Reps Performed: 10  Level of Assist: Minimal assistance     SEATED EXERCISES Sets Reps Comments   Ankle Pumps 2 10    Hip Flexion 2 10    Long Arc Quads 2 10    Hip Adduction/Ball Squeeze 2 10    Hip Abduction with red Theraband 2 10    Hamstring Curls with red Theraband 2 10    Hip ext with red  t-band                        2         10         Assessment: Improved tolerance to treatment with no pain. Initially with dizziness in standing but resolved as treatment progressed. Improved awareness of orthostatic hypotension symptoms. Patient returned to room at end of treatment and remained up in recliner with LEs elevated and with needs in reach. 02 at 1 lpm    Plan of Care: Continue with POC and progress as tolerated.      Almita Starks, PT  3/26/2022

## 2022-03-27 PROCEDURE — 94760 N-INVAS EAR/PLS OXIMETRY 1: CPT

## 2022-03-27 PROCEDURE — 74011250637 HC RX REV CODE- 250/637: Performed by: PHYSICAL MEDICINE & REHABILITATION

## 2022-03-27 PROCEDURE — 94664 DEMO&/EVAL PT USE INHALER: CPT

## 2022-03-27 PROCEDURE — 3331090001 HH PPS REVENUE CREDIT

## 2022-03-27 PROCEDURE — 65310000000 HC RM PRIVATE REHAB

## 2022-03-27 PROCEDURE — 99231 SBSQ HOSP IP/OBS SF/LOW 25: CPT | Performed by: PHYSICAL MEDICINE & REHABILITATION

## 2022-03-27 PROCEDURE — 74011636637 HC RX REV CODE- 636/637: Performed by: PHYSICAL MEDICINE & REHABILITATION

## 2022-03-27 PROCEDURE — 74011250637 HC RX REV CODE- 250/637: Performed by: PHYSICIAN ASSISTANT

## 2022-03-27 PROCEDURE — 3331090002 HH PPS REVENUE DEBIT

## 2022-03-27 PROCEDURE — 77010033678 HC OXYGEN DAILY

## 2022-03-27 PROCEDURE — 94640 AIRWAY INHALATION TREATMENT: CPT

## 2022-03-27 RX ORDER — BUTALBITAL, ACETAMINOPHEN AND CAFFEINE 50; 325; 40 MG/1; MG/1; MG/1
1 TABLET ORAL
Status: DISCONTINUED | OUTPATIENT
Start: 2022-03-27 | End: 2022-03-31 | Stop reason: HOSPADM

## 2022-03-27 RX ADMIN — GUAIFENESIN 600 MG: 600 TABLET ORAL at 08:38

## 2022-03-27 RX ADMIN — BUDESONIDE AND FORMOTEROL FUMARATE DIHYDRATE 2 PUFF: 160; 4.5 AEROSOL RESPIRATORY (INHALATION) at 09:07

## 2022-03-27 RX ADMIN — GABAPENTIN 100 MG: 100 CAPSULE ORAL at 21:56

## 2022-03-27 RX ADMIN — LORAZEPAM 0.5 MG: 0.5 TABLET ORAL at 22:05

## 2022-03-27 RX ADMIN — ESCITALOPRAM OXALATE 10 MG: 10 TABLET ORAL at 08:37

## 2022-03-27 RX ADMIN — TORSEMIDE 10 MG: 20 TABLET ORAL at 08:33

## 2022-03-27 RX ADMIN — ASPIRIN 81 MG: 81 TABLET, CHEWABLE ORAL at 08:32

## 2022-03-27 RX ADMIN — TIOTROPIUM BROMIDE INHALATION SPRAY 2 PUFF: 3.12 SPRAY, METERED RESPIRATORY (INHALATION) at 08:42

## 2022-03-27 RX ADMIN — CALCIUM CARBONATE-CHOLECALCIFEROL TAB 250 MG-125 UNIT 1 TABLET: 250-125 TAB at 08:32

## 2022-03-27 RX ADMIN — ACETAMINOPHEN 650 MG: 325 TABLET ORAL at 22:04

## 2022-03-27 RX ADMIN — PREDNISONE 15 MG: 10 TABLET ORAL at 08:30

## 2022-03-27 RX ADMIN — BUDESONIDE AND FORMOTEROL FUMARATE DIHYDRATE 2 PUFF: 160; 4.5 AEROSOL RESPIRATORY (INHALATION) at 19:20

## 2022-03-27 RX ADMIN — ACETAMINOPHEN 650 MG: 325 TABLET ORAL at 05:59

## 2022-03-27 RX ADMIN — BUTALBITAL, ACETAMINOPHEN, AND CAFFEINE 1 TABLET: 50; 325; 40 TABLET ORAL at 16:04

## 2022-03-27 RX ADMIN — ATORVASTATIN CALCIUM 80 MG: 80 TABLET, FILM COATED ORAL at 21:56

## 2022-03-27 RX ADMIN — ACETAMINOPHEN 650 MG: 325 TABLET ORAL at 09:53

## 2022-03-27 RX ADMIN — EZETIMIBE 10 MG: 10 TABLET ORAL at 08:33

## 2022-03-27 RX ADMIN — GABAPENTIN 100 MG: 100 CAPSULE ORAL at 08:34

## 2022-03-27 RX ADMIN — CETIRIZINE HYDROCHLORIDE 5 MG: 10 TABLET, FILM COATED ORAL at 08:38

## 2022-03-27 RX ADMIN — PANTOPRAZOLE SODIUM 40 MG: 40 TABLET, DELAYED RELEASE ORAL at 05:57

## 2022-03-27 RX ADMIN — CLOPIDOGREL BISULFATE 75 MG: 75 TABLET ORAL at 08:36

## 2022-03-27 RX ADMIN — METOPROLOL SUCCINATE 100 MG: 50 TABLET, EXTENDED RELEASE ORAL at 08:35

## 2022-03-27 RX ADMIN — ZINC 1 TABLET: TAB ORAL at 09:53

## 2022-03-27 RX ADMIN — ACETAMINOPHEN 650 MG: 325 TABLET ORAL at 14:45

## 2022-03-27 RX ADMIN — METHIMAZOLE 10 MG: 5 TABLET ORAL at 08:34

## 2022-03-27 RX ADMIN — OXYCODONE HYDROCHLORIDE 5 MG: 5 TABLET ORAL at 12:59

## 2022-03-27 RX ADMIN — GABAPENTIN 100 MG: 100 CAPSULE ORAL at 16:05

## 2022-03-27 RX ADMIN — PANTOPRAZOLE SODIUM 40 MG: 40 TABLET, DELAYED RELEASE ORAL at 16:05

## 2022-03-27 RX ADMIN — GUAIFENESIN 600 MG: 600 TABLET ORAL at 21:56

## 2022-03-27 NOTE — PROGRESS NOTES
Margie Loja MD  Medical Director  [address] [facility], [address] [facility]  Tel: [phone] Jem Elan PROGRESS NOTE    [place] [facility]  Admit Date: 3/18/2022  Admit Diagnosis:   SDH (subdural hematoma) ([facility] [S06.5X9A]    Subjective     Patient seen and examined. Denies CP, lightheadedness, SOB or nausea. Participating in therapy.     Objective:     Current Facility-Administered Medications   Medication Dose Route Frequency    cetirizine (ZYRTEC) tablet 5 mg  5 mg Oral DAILY    multivitamin w ZN (STRESSTABS W ZINC) tablet  1 Tablet Oral DAILY    calcium-vitamin D (OS-DOMINGO +D3) 250 mg-125 unit per tablet 1 Tablet  1 Tablet Oral DAILY    torsemide (DEMADEX) tablet 10 mg  10 mg Oral DAILY    clopidogreL (PLAVIX) tablet 75 mg  75 mg Oral DAILY    azithromycin (ZITHROMAX) tablet 500 mg  500 mg Oral Q MON, WED & FRI    budesonide-formoteroL (SYMBICORT) 160-4.5 mcg/actuation HFA inhaler 2 Puff  2 Puff Inhalation BID RT    escitalopram oxalate (LEXAPRO) tablet 10 mg  10 mg Oral DAILY    ezetimibe (ZETIA) tablet 10 mg  10 mg Oral DAILY    gabapentin (NEURONTIN) capsule 100 mg  100 mg Oral TID    LORazepam (ATIVAN) tablet 0.5 mg  0.5 mg Oral Q8H PRN    methIMAzole (TAPAZOLE) tablet 10 mg  10 mg Oral DAILY    metoprolol succinate (TOPROL-XL) XL tablet 100 mg  100 mg Oral DAILY    nitroglycerin (NITROSTAT) tablet 0.4 mg  0.4 mg SubLINGual PRN    ondansetron (ZOFRAN ODT) tablet 4 mg  4 mg Oral Q6H PRN    oxyCODONE IR (ROXICODONE) tablet 5 mg  5 mg Oral Q4H PRN    pantoprazole (PROTONIX) tablet 40 mg  40 mg Oral ACB&D    polyethylene glycol (MIRALAX) packet 17 g  17 g Oral DAILY    predniSONE (DELTASONE) tablet 15 mg  15 mg Oral DAILY WITH BREAKFAST    tiotropium bromide (SPIRIVA RESPIMAT) 2.5 mcg /actuation  2 Puff Inhalation DAILY    traZODone (DESYREL) tablet 50 mg  50 mg Oral QHS PRN    acetaminophen (TYLENOL) tablet 650 mg  650 mg Oral Q4H PRN    atorvastatin (LIPITOR) tablet 80 mg  80 mg Oral QHS    bisacodyL (DULCOLAX) tablet 5 mg  5 mg Oral DAILY PRN    senna-docusate (PERICOLACE) 8.6-50 mg per tablet 2 Tablet  2 Tablet Oral QHS    guaiFENesin ER (MUCINEX) tablet 600 mg  600 mg Oral Q12H    aspirin chewable tablet 81 mg  81 mg Oral DAILY       Review of Systems:   Denies cough, visual problems, abdominal pain, dysuria, calf pain. Pertinent positives are as noted in the HPI, ROS unremarkable otherwise. Visit Vitals  BP (!) 125/51 (BP 1 Location: Right upper arm, BP Patient Position: At rest;Sitting)   Pulse 65   Temp 98.8 °F (37.1 °C)   Resp 17   Wt 59.6 kg (131 lb 6.4 oz)   SpO2 95%   BMI 19.98 kg/m²        Physical Exam:   General: Alert, oriented x 4. OOB and ambulating today with PT. HEENT: Normocephalic. Oral mucosa moist.   Lungs: CTA without R/R/R. Respirations unlabored. Heart: Regular rate and rhythm, no appreciable murmur. Abdomen: Soft, non-tender, not distended. Bowel sounds normoactive        Neuromuscular:      EOM intact, visual fields full to finger confrontation. No gross focal neuro deficits. UE strength at biceps, triceps and  4+/5 and symmetric bilaterally. LE strength at HF 4-/5 (R), 4/5 (L), distally 4+/5 and symmetric s/p right IT fracture. Sensation intact distally. Skin/extremity: No rashes, no erythema. Calves soft, non-tender BLE. Multiple resolving ecchymoses to UE, R>>L. Trace edema today.        Functional Assessment:  Balance  Sitting - Static: Good (unsupported) (03/26/22 1200)  Sitting - Dynamic: Good (unsupported) (03/26/22 1200)  Standing - Static: Fair (with RW) (03/26/22 1200)  Standing - Dynamic : Impaired (03/26/22 1200)       Elsie Patricio Fall Risk Assessment:  Elsie Patricio Fall Risk  Mobility: Ambulates or transfers with assist devices or assistance (03/27/22 1032)  Mobility Interventions: Patient to call before getting OOB;Utilize walker, cane, or other assistive device (03/27/22 1032)  Mentation: Alert, oriented x 3 (03/27/22 1032)  Medication: Patient receiving anticonvulsants, sedatives(tranquilizers), psychotropics or hypnotics, hypoglycemics, narcotics, sleep aids, antihypertensives, laxatives, or diuretics (03/27/22 1032)  Medication Interventions: Patient to call before getting OOB (03/27/22 1032)  Elimination: Needs assistance with toileting (03/27/22 1032)  Elimination Interventions: Call light in reach (03/27/22 1032)  Prior Fall History: Before admission in past 12 months _home or previous inpatient care) (03/27/22 1032)  History of Falls Interventions: Consult care management for discharge planning (03/27/22 1032)  Total Score: 4 (03/27/22 1032)  Standard Fall Precautions: Yes (03/23/22 2010)  High Fall Risk: Yes (03/27/22 1032)     Ambulation:  Gait  Distance (ft): 140 Feet (ft) (03/26/22 1200)  Assistive Device: Walker, rolling (03/26/22 1200)  Rail Use:  (with RW) (03/25/22 1600)     Labs/Studies:  Recent Results (from the past 72 hour(s))   CBC W/O DIFF    Collection Time: 03/25/22  6:12 AM   Result Value Ref Range    WBC 6.1 4.3 - 11.1 K/uL    RBC 3.01 (L) 4.05 - 5.2 M/uL    HGB 9.0 (L) 11.7 - 15.4 g/dL    HCT 29.5 (L) 35.8 - 46.3 %    MCV 98.0 (H) 79.6 - 97.8 FL    MCH 29.9 26.1 - 32.9 PG    MCHC 30.5 (L) 31.4 - 35.0 g/dL    RDW 15.4 (H) 11.9 - 14.6 %    PLATELET 537 983 - 613 K/uL    MPV 9.4 9.4 - 12.3 FL    ABSOLUTE NRBC 0.00 0.0 - 0.2 K/uL   METABOLIC PANEL, BASIC    Collection Time: 03/25/22  6:12 AM   Result Value Ref Range    Sodium 140 136 - 145 mmol/L    Potassium 3.6 3.5 - 5.1 mmol/L    Chloride 105 98 - 107 mmol/L    CO2 30 21 - 32 mmol/L    Anion gap 5 (L) 7 - 16 mmol/L    Glucose 92 65 - 100 mg/dL    BUN 16 8 - 23 MG/DL    Creatinine 0.60 0.6 - 1.0 MG/DL    GFR est AA >60 >60 ml/min/1.73m2    GFR est non-AA >60 >60 ml/min/1.73m2    Calcium 9.0 8.3 - 10.4 MG/DL   NT-PRO BNP    Collection Time: 03/25/22  6:12 AM   Result Value Ref Range    NT pro-BNP 2,917 (H) 5 - 125 PG/ML       Assessment:     Problem List as of 3/27/2022 Date Reviewed: 3/21/2022          Codes Class Noted - Resolved    Chest pain ICD-10-CM: R07.9  ICD-9-CM: 786.50  5/31/2019 - Present        * (Principal) SDH (subdural hematoma) ([facility] ICD-10-CM: Z44.0J5L  ICD-9-CM: 432.1  3/18/2022 - Present        Post-traumatic subdural hematoma ([facility] ICD-10-CM: L64.1V3X  ICD-9-CM: 852.20  3/15/2022 - Present        Hypokalemic alkalosis ICD-10-CM: E87.3  ICD-9-CM: 276.3  3/15/2022 - Present        EKG, abnormal ICD-10-CM: R94.31  ICD-9-CM: 794.31  2/15/2022 - Present        Femur fracture, right ([facility] ICD-10-CM: S72.91XA  ICD-9-CM: 821.00  2/11/2022 - Present        COPD with acute lower respiratory infection ([facility] ICD-10-CM: J44.0  ICD-9-CM: 496, 519.8  2/10/2022 - Present        Moderate to severe mitral regurgitation ICD-10-CM: I34.0  ICD-9-CM: 424.0  2/8/2022 - Present        Iron deficiency anemia due to chronic blood loss (Chronic) ICD-10-CM: D50.0  ICD-9-CM: 280.0  2/8/2022 - Present        Closed right hip fracture ([facility] ICD-10-CM: S72.001A  ICD-9-CM: 820.8  2/4/2022 - Present        Other fracture of right femur, initial encounter for closed fracture ([facility] ICD-10-CM: N94.0D2E  ICD-9-CM: 821.00  2/4/2022 - Present        Multiple closed anterior-posterior compression fractures of pelvis ([facility] ICD-10-CM: H55.03CJ  ICD-9-CM: 808.44  2/2/2022 - Present        DDD (degenerative disc disease), lumbar ICD-10-CM: M51.36  ICD-9-CM: 722.52  2/2/2022 - Present        DNR (do not resuscitate) ICD-10-CM: Z66  ICD-9-CM: V49.86  11/9/2021 - Present    Overview Signed 11/9/2021 12:25 PM by Oliverio Boo NP     POST form completed - DNR but full treatment, including intubation. No TRACH. No PEG.               Acute bilateral low back pain without sciatica ICD-10-CM: M54.50  ICD-9-CM: 724.2, 338.19  11/9/2021 - Present        Palliative care patient ICD-10-CM: Z51.5  ICD-9-CM: V66.7  7/14/2021 - Present Depression ICD-10-CM: F31. A  ICD-9-CM: 221  5/6/2021 - Present        Anxiety ICD-10-CM: F41.9  ICD-9-CM: 300.00  5/6/2021 - Present        Ischemic heart disease, hx pci, cath/pci last 5/2019 ICD-10-CM: I25.9  ICD-9-CM: 414.9  2/24/2021 - Present        H/O carotid endarterectomy ICD-10-CM: Z98.890  ICD-9-CM: V45.89  9/21/2020 - Present        Right ear pain ICD-10-CM: H92.01  ICD-9-CM: 388.70  9/21/2020 - Present        Pulmonary mass ICD-10-CM: R91.8  ICD-9-CM: 786.6  2/11/2020 - Present        Centrilobular emphysema ([facility] [address] ICD-10-CM: J43.2  ICD-9-CM: 492.8  12/20/2019 - Present        Carotid stenosis ICD-10-CM: I65.29  ICD-9-CM: 433.10  12/3/2019 - Present        Carotid stenosis, right ICD-10-CM: I65.21  ICD-9-CM: 433.10  12/3/2019 - Present        Hypertensive urgency ICD-10-CM: I16.0  ICD-9-CM: 401.9  6/8/2019 - Present        Chronic respiratory failure with hypoxia (HCC) (Chronic) ICD-10-CM: J96.11  ICD-9-CM: 518.83, 799.02  6/8/2019 - Present        CAD S/P percutaneous coronary angioplasty ICD-10-CM: I25.10, Z98.61  ICD-9-CM: 414.01, V45.82  5/31/2019 - Present        Ventricular ectopy ICD-10-CM: I49.3  ICD-9-CM: 427.69  5/9/2017 - Present        Chronic anxiety ICD-10-CM: F41.9  ICD-9-CM: 300.00  4/27/2017 - Present        Hyperlipidemia (Chronic) ICD-10-CM: E78.5  ICD-9-CM: 272.4  10/10/2016 - Present        Essential hypertension, benign (Chronic) ICD-10-CM: I10  ICD-9-CM: 401.1  10/10/2016 - Present        Coronary artery disease involving native coronary artery of native heart without angina pectoris ICD-10-CM: I25.10  ICD-9-CM: 414.01  10/10/2016 - Present        Hypoxemia ICD-10-CM: R09.02  ICD-9-CM: 799.02  8/25/2013 - Present    Overview Signed 2/5/2014  9:08 AM by Елена Slaughter NP     FILIBERTO 8/2013. Pt had over 2 hours 1/2 hours of desaturations at night. O2 was ordered for her to start at 2L in September, but she stated she was feeling better than when FILIBERTO was done and refused O2. FILIBERTO 12/2013: not performed secondary to patient factors. Acute respiratory failure with hypoxia (HCC) ICD-10-CM: J96.01  ICD-9-CM: 518.81  8/22/2013 - Present        COPD, very severe (HCC) (Chronic) ICD-10-CM: J44.9  ICD-9-CM: 496  8/20/2013 - Present        Personal history of tobacco use ICD-10-CM: H39.602  ICD-9-CM: V15.82  8/19/2013 - Present        History of MI (myocardial infarction) ICD-10-CM: I25.2  ICD-9-CM: 536  8/19/2013 - Present    Overview Addendum 8/30/2018  9:20 AM by Pineda Short MD     STEMI 8/19/13:  Angioplasty for in-stent stenosis to LAD             RESOLVED: Chronic obstructive pulmonary disease ([facility] ICD-10-CM: J44.9  ICD-9-CM: 496  10/10/2016 - 8/30/2018        RESOLVED: Acute systolic heart failure ([facility] ICD-10-CM: I50.21  ICD-9-CM: 428.21  8/22/2013 - 8/30/2018        RESOLVED: Pulmonary hemorrhage ICD-10-CM: R04.89  ICD-9-CM: 786.30  8/20/2013 - 6/25/2019        RESOLVED: CAD (coronary artery disease) (Chronic) ICD-10-CM: I25.10  ICD-9-CM: 414.00  8/19/2013 - 8/3/2021        RESOLVED: COPD (chronic obstructive pulmonary disease) (HCC) (Chronic) ICD-10-CM: J44.9  ICD-9-CM: 496  8/19/2013 - 8/20/2013            S/p traumatic subdural hematoma     Plan / Recommendations / Medical Decision Making:     Continue daily physician / PA medical management:  Subdural hematoma - PT/OT; good rehab potential.     S/p right IT fracture 2/2022 now s/p fall with fx of the lesser trochanter; WBAT. No surgery required, per Dr iYnka Nicole.     Hyperlipidemia - continue Lipitor and Zetia.     COPD - O2- and steroid-dependent, 1-2 L O2. Continue Symbicort, Spiriva and prednisone 15mg. Encourage IS. She is on prophylactic azithromycin 500mg MWF for inflammation due to COPD / emphysema.  There was concern during hospitalization 2/2022 of pulmonary malignancy with a 2.8cm left perihilar lower lobe spiculated mass per CT chest 2/26/2022.  -3/26 Low to upper 90s on 1-2 L of O2.      Hx of MI - s/p stent to LAD and distal RCA 2/7/2022; continue statin, was on Effient at home. Plans to restart ASA and begin Plavix; will wait 2wks post SDH; this was a very small bleed. ASA 81mg restarted on [facility] admission; monitor clinically. Seen by Dr Florentin Mathur 3/16.  -3/21 per Cardiology, Travon Trevizo I would like to start back dual antiplatelet therapy (using aspirin and clopidogrel) in 3 to 4 days if possible\"; per Neurosurgery, okay to start Plavix  -3/24. ASA/Plavix causing challenges with skin. Continue to monitor. --3/25. Bout of CP this am controlled with Nitro. Doing well.     Hypertension - BP fluctuating, managed medically. Continue Toprol XL, PRN SL NTG.    -3/22. BP fluctuating. /. No dizziness or orthostasis. Increasing LE edema. Resume Torsemide 10 mg PO every day. Compression garments.   -3/23. BP stable a t 114-148/50-56. Toprol XL held this am. Continue to monitor. Continue Torsemide and compression garments.  -3/27 BP - 125/51, asymptomatic    Anemia - Hgb 9.3 and stable.  -3/21 Hgb 8.3, drifting down; recheck in 2d  -3/25. Hgb 9.0 and Hct 29.5. Progressing in a positive direction, B complex begun     Hypokalemia - resolved; monitor and replace if needed. -3/21 K+ 4.1 today.  -3/25. Electrolytes stable. BNP improving. CHF. BNP markedly elevated at 4033.  -3/22. Resume Torsemide 10 mg PO every day. -3/25. BNP 2917 and trending downward. Continue Torsemide. Osteoporosis. Start Calcium/Vitamin D every day. Consider the addition of medication as an outpatient in addition to resistance training and vibration plate therapy. Pain management - fairly recent right hip fracture.  Will require regular pain assessment and comprehensive pain management. PRN oxycodone and APAP; history of T12-L1 compression fractures.     Pneumonia prophylaxis - incentive spirometer every hour while awake.     DVT risk / DVT prophylaxis - daily physician / PA exam to assess as patient is at increased risk for of thromboembolism. Mobilize as tolerated. Sequential pneumatic compression devices (SCDs) when in bed; thigh-high or knee-high thromboembolic deterrent hose when out of bed. No anticoagulation due to SDH.  -3/21 on ASA since 3/19; okay to start Plavix per NSG     GERD - continue Protonix BID. At times may need additional antacids, Maalox prn. Depression - continue on Lexapro. At risk of depression exacerbation due to re-hospitalization and decline in functional mobility and independence. Resume PRN Ativan for anxiety.     General skin care / wound prevention - monitor general skin status daily per staff and physician / PA. At risk for failure due to impaired mobility. Patient with multiple bruises and friable skin.     Bladder program - voiding without difficulty.     Bowel program - at risk for constipation as a side effect of opioids, other medications, impaired mobility, etc. MiraLAX daily for regularity, Marisela-Colace for stool softener. PRN MOM, bisacodyl suppository or tablets for constipation.     Hyperthyroidism - continue Tapazole. D/C IV from [facility]. Time spent was 15 minutes with over 1/2 in direct patient care/examination, consultation and coordination of care.      Signed By: Daniel Dobson MD    March 27, 2022

## 2022-03-27 NOTE — PROGRESS NOTES
Problem: Falls - Risk of  Goal: *Absence of Falls  Description: Document Shannon Mcburney Fall Risk and appropriate interventions in the flowsheet.   Outcome: Progressing Towards Goal  Note: Fall Risk Interventions:  Mobility Interventions: Patient to call before getting OOB,Utilize walker, cane, or other assistive device         Medication Interventions: Patient to call before getting OOB    Elimination Interventions: Call light in reach,Patient to call for help with toileting needs,Toileting schedule/hourly rounds    History of Falls Interventions: Consult care management for discharge planning         Problem: Pain  Goal: *Control of Pain  Outcome: Progressing Towards Goal     Problem: Patient Education: Go to Patient Education Activity  Goal: Patient/Family Education  Outcome: Progressing Towards Goal

## 2022-03-28 PROCEDURE — 65310000000 HC RM PRIVATE REHAB

## 2022-03-28 PROCEDURE — 97535 SELF CARE MNGMENT TRAINING: CPT

## 2022-03-28 PROCEDURE — 97530 THERAPEUTIC ACTIVITIES: CPT

## 2022-03-28 PROCEDURE — 97116 GAIT TRAINING THERAPY: CPT

## 2022-03-28 PROCEDURE — 3331090001 HH PPS REVENUE CREDIT

## 2022-03-28 PROCEDURE — 97110 THERAPEUTIC EXERCISES: CPT

## 2022-03-28 PROCEDURE — 99232 SBSQ HOSP IP/OBS MODERATE 35: CPT | Performed by: PHYSICAL MEDICINE & REHABILITATION

## 2022-03-28 PROCEDURE — 3331090002 HH PPS REVENUE DEBIT

## 2022-03-28 PROCEDURE — 74011250637 HC RX REV CODE- 250/637: Performed by: PHYSICAL MEDICINE & REHABILITATION

## 2022-03-28 PROCEDURE — 2709999900 HC NON-CHARGEABLE SUPPLY

## 2022-03-28 PROCEDURE — 74011250637 HC RX REV CODE- 250/637: Performed by: PHYSICIAN ASSISTANT

## 2022-03-28 PROCEDURE — 74011636637 HC RX REV CODE- 636/637: Performed by: PHYSICAL MEDICINE & REHABILITATION

## 2022-03-28 RX ADMIN — CALCIUM CARBONATE-CHOLECALCIFEROL TAB 250 MG-125 UNIT 1 TABLET: 250-125 TAB at 09:19

## 2022-03-28 RX ADMIN — METHIMAZOLE 10 MG: 5 TABLET ORAL at 09:22

## 2022-03-28 RX ADMIN — ACETAMINOPHEN 650 MG: 325 TABLET ORAL at 14:14

## 2022-03-28 RX ADMIN — GUAIFENESIN 600 MG: 600 TABLET ORAL at 21:12

## 2022-03-28 RX ADMIN — TIOTROPIUM BROMIDE INHALATION SPRAY 2 PUFF: 3.12 SPRAY, METERED RESPIRATORY (INHALATION) at 06:02

## 2022-03-28 RX ADMIN — TORSEMIDE 10 MG: 20 TABLET ORAL at 09:20

## 2022-03-28 RX ADMIN — GABAPENTIN 100 MG: 100 CAPSULE ORAL at 17:55

## 2022-03-28 RX ADMIN — METOPROLOL SUCCINATE 100 MG: 50 TABLET, EXTENDED RELEASE ORAL at 09:28

## 2022-03-28 RX ADMIN — ONDANSETRON 4 MG: 4 TABLET, ORALLY DISINTEGRATING ORAL at 22:57

## 2022-03-28 RX ADMIN — BUDESONIDE AND FORMOTEROL FUMARATE DIHYDRATE 2 PUFF: 160; 4.5 AEROSOL RESPIRATORY (INHALATION) at 06:05

## 2022-03-28 RX ADMIN — PANTOPRAZOLE SODIUM 40 MG: 40 TABLET, DELAYED RELEASE ORAL at 17:56

## 2022-03-28 RX ADMIN — ACETAMINOPHEN 650 MG: 325 TABLET ORAL at 09:36

## 2022-03-28 RX ADMIN — ACETAMINOPHEN 650 MG: 325 TABLET ORAL at 05:34

## 2022-03-28 RX ADMIN — LORAZEPAM 0.5 MG: 0.5 TABLET ORAL at 22:51

## 2022-03-28 RX ADMIN — GUAIFENESIN 600 MG: 600 TABLET ORAL at 09:28

## 2022-03-28 RX ADMIN — OXYCODONE HYDROCHLORIDE 5 MG: 5 TABLET ORAL at 20:15

## 2022-03-28 RX ADMIN — PANTOPRAZOLE SODIUM 40 MG: 40 TABLET, DELAYED RELEASE ORAL at 05:34

## 2022-03-28 RX ADMIN — BUDESONIDE AND FORMOTEROL FUMARATE DIHYDRATE 2 PUFF: 160; 4.5 AEROSOL RESPIRATORY (INHALATION) at 16:18

## 2022-03-28 RX ADMIN — ACETAMINOPHEN 650 MG: 325 TABLET ORAL at 21:12

## 2022-03-28 RX ADMIN — AZITHROMYCIN MONOHYDRATE 500 MG: 250 TABLET ORAL at 21:12

## 2022-03-28 RX ADMIN — ESCITALOPRAM OXALATE 10 MG: 10 TABLET ORAL at 09:24

## 2022-03-28 RX ADMIN — CETIRIZINE HYDROCHLORIDE 5 MG: 10 TABLET, FILM COATED ORAL at 09:23

## 2022-03-28 RX ADMIN — ASPIRIN 81 MG: 81 TABLET, CHEWABLE ORAL at 09:21

## 2022-03-28 RX ADMIN — ATORVASTATIN CALCIUM 80 MG: 80 TABLET, FILM COATED ORAL at 21:12

## 2022-03-28 RX ADMIN — CLOPIDOGREL BISULFATE 75 MG: 75 TABLET ORAL at 09:27

## 2022-03-28 RX ADMIN — PREDNISONE 15 MG: 10 TABLET ORAL at 09:25

## 2022-03-28 RX ADMIN — LORAZEPAM 0.5 MG: 0.5 TABLET ORAL at 14:18

## 2022-03-28 RX ADMIN — BUTALBITAL, ACETAMINOPHEN, AND CAFFEINE 1 TABLET: 50; 325; 40 TABLET ORAL at 00:51

## 2022-03-28 RX ADMIN — GABAPENTIN 100 MG: 100 CAPSULE ORAL at 21:12

## 2022-03-28 RX ADMIN — ZINC 1 TABLET: TAB ORAL at 09:22

## 2022-03-28 RX ADMIN — EZETIMIBE 10 MG: 10 TABLET ORAL at 09:20

## 2022-03-28 RX ADMIN — GABAPENTIN 100 MG: 100 CAPSULE ORAL at 09:26

## 2022-03-28 NOTE — PROGRESS NOTES
Problem: Falls - Risk of  Goal: *Absence of Falls  Description: Document Tyrone Rudd Fall Risk and appropriate interventions in the flowsheet.   Outcome: Progressing Towards Goal  Note: Fall Risk Interventions:  Mobility Interventions: Bed/chair exit alarm,Communicate number of staff needed for ambulation/transfer,Patient to call before getting OOB,Strengthening exercises (ROM-active/passive),Utilize walker, cane, or other assistive device         Medication Interventions: Evaluate medications/consider consulting pharmacy,Patient to call before getting OOB,Teach patient to arise slowly    Elimination Interventions: Call light in reach,Patient to call for help with toileting needs,Toilet paper/wipes in reach    History of Falls Interventions: Consult care management for discharge planning,Door open when patient unattended,Evaluate medications/consider consulting pharmacy         Problem: Patient Education: Go to Patient Education Activity  Goal: Patient/Family Education  Outcome: Progressing Towards Goal     Problem: Pain  Goal: *Control of Pain  Outcome: Progressing Towards Goal

## 2022-03-28 NOTE — PROGRESS NOTES
Makeda Chan MD  Medical Director  [address] [facility], [address] [facility]  Tel: 1926 [address] PROGRESS NOTE    Radha Tyler [facility]  Admit Date: 3/18/2022  Admit Diagnosis:   SDH (subdural hematoma) ([facility] [address] [S06.5X9A]    Subjective     Patient seen and examined after breakfast, OOB in recliner with B LE elevated. Reports same right-sided HA, sometimes responds to APAP. Discussed likelihood this will continue until SDH blood reabsorbed. She asks about timing of f/u head CT. Reports baseline dyspnea, denies CP, palpitations, dizziness.      Objective:     Current Facility-Administered Medications   Medication Dose Route Frequency    butalbital-acetaminophen-caffeine (FIORICET, ESGIC) -40 mg per tablet 1 Tablet  1 Tablet Oral Q6H PRN    cetirizine (ZYRTEC) tablet 5 mg  5 mg Oral DAILY    multivitamin w ZN (STRESSTABS W ZINC) tablet  1 Tablet Oral DAILY    calcium-vitamin D (OS-DOMINGO +D3) 250 mg-125 unit per tablet 1 Tablet  1 Tablet Oral DAILY    torsemide (DEMADEX) tablet 10 mg  10 mg Oral DAILY    clopidogreL (PLAVIX) tablet 75 mg  75 mg Oral DAILY    azithromycin (ZITHROMAX) tablet 500 mg  500 mg Oral Q MON, WED & FRI    budesonide-formoteroL (SYMBICORT) 160-4.5 mcg/actuation HFA inhaler 2 Puff  2 Puff Inhalation BID RT    escitalopram oxalate (LEXAPRO) tablet 10 mg  10 mg Oral DAILY    ezetimibe (ZETIA) tablet 10 mg  10 mg Oral DAILY    gabapentin (NEURONTIN) capsule 100 mg  100 mg Oral TID    LORazepam (ATIVAN) tablet 0.5 mg  0.5 mg Oral Q8H PRN    methIMAzole (TAPAZOLE) tablet 10 mg  10 mg Oral DAILY    metoprolol succinate (TOPROL-XL) XL tablet 100 mg  100 mg Oral DAILY    nitroglycerin (NITROSTAT) tablet 0.4 mg  0.4 mg SubLINGual PRN    ondansetron (ZOFRAN ODT) tablet 4 mg  4 mg Oral Q6H PRN    oxyCODONE IR (ROXICODONE) tablet 5 mg  5 mg Oral Q4H PRN    pantoprazole (PROTONIX) tablet 40 mg  40 mg Oral ACB&D    polyethylene glycol (MIRALAX) packet 17 g  17 g Oral DAILY    predniSONE (DELTASONE) tablet 15 mg  15 mg Oral DAILY WITH BREAKFAST    tiotropium bromide (SPIRIVA RESPIMAT) 2.5 mcg /actuation  2 Puff Inhalation DAILY    traZODone (DESYREL) tablet 50 mg  50 mg Oral QHS PRN    acetaminophen (TYLENOL) tablet 650 mg  650 mg Oral Q4H PRN    atorvastatin (LIPITOR) tablet 80 mg  80 mg Oral QHS    bisacodyL (DULCOLAX) tablet 5 mg  5 mg Oral DAILY PRN    senna-docusate (PERICOLACE) 8.6-50 mg per tablet 2 Tablet  2 Tablet Oral QHS    guaiFENesin ER (MUCINEX) tablet 600 mg  600 mg Oral Q12H    aspirin chewable tablet 81 mg  81 mg Oral DAILY       Review of Systems:   Denies cough, visual problems, abdominal pain, dysuria, calf pain. Pertinent positives are as noted in the HPI, ROS unremarkable otherwise. Visit Vitals  /63 (BP 1 Location: Left upper arm, BP Patient Position: Sitting)   Pulse 65   Temp 98 °F (36.7 °C)   Resp 18   Wt 131 lb 6.4 oz (59.6 kg)   SpO2 98%   BMI 19.98 kg/m²        Physical Exam:   General: Alert, oriented x 4. OOB in recliner with B LE elevated. HEENT: Normocephalic, less tender pecan-sized hematoma at right frontotemporal skull. EOM intact, oral mucosa moist.   Lungs: Clear to auscultation, no wheeze / crackle / rhonchi. Respirations unlabored. Heart: Regular rate and rhythm. No appreciable murmur but heart sounds are muffled. Abdomen: Soft, non-tender, not distended. Bowel sounds normoactive    Genitourinary: Deferred. Neuromuscular:      Speech clear, thoughts cogent. No gross focal neuro deficits. UE strength at biceps, triceps and  4+/5 and symmetric bilaterally. LE strength at HF 4-/5 (R), 4/5 (L), distally 4+/5 and symmetric s/p right IT fracture. Sensation intact distally. Skin/extremity: No rashes, no erythema. Calves soft, non-tender B LE. Multiple resolving ecchymoses to UE, R>>L. Trace edema today.        Functional Assessment:  Balance  Sitting - Static: Good (unsupported) (03/26/22 1200)  Sitting - Dynamic: Good (unsupported) (03/26/22 1200)  Standing - Static: Fair (with RW) (03/26/22 1200)  Standing - Dynamic : Impaired (03/26/22 1200)       Opal Prakash Fall Risk Assessment:  Opal Prakash Fall Risk  Mobility: Ambulates or transfers with assist devices or assistance (03/28/22 6493)  Mobility Interventions: Bed/chair exit alarm;Communicate number of staff needed for ambulation/transfer;Patient to call before getting OOB; Strengthening exercises (ROM-active/passive); Utilize walker, cane, or other assistive device (03/28/22 7725)  Mentation: Alert, oriented x 3 (03/28/22 0034)  Medication: Patient receiving anticonvulsants, sedatives(tranquilizers), psychotropics or hypnotics, hypoglycemics, narcotics, sleep aids, antihypertensives, laxatives, or diuretics (03/28/22 4605)  Medication Interventions: Evaluate medications/consider consulting pharmacy (03/28/22 7003)  Elimination: Needs assistance with toileting (03/28/22 6054)  Elimination Interventions: Call light in reach; Patient to call for help with toileting needs (03/28/22 0811)  Prior Fall History: Before admission in past 12 months _home or previous inpatient care) (03/28/22 2692)  History of Falls Interventions: Consult care management for discharge planning;Door open when patient unattended;Evaluate medications/consider consulting pharmacy (03/28/22 0811)  Total Score: 4 (03/28/22 0811)  Standard Fall Precautions: Yes (03/23/22 2010)  High Fall Risk: Yes (03/28/22 0811)     Ambulation:  Gait  Distance (ft): 140 Feet (ft) (03/26/22 1200)  Assistive Device: Walker, rolling (03/26/22 1200)  Rail Use:  (with RW) (03/25/22 1600)     Labs/Studies:  No results found for this or any previous visit (from the past 67 hour(s)).     Assessment:     Problem List as of 3/28/2022 Date Reviewed: 3/21/2022          Codes Class Noted - Resolved    Chest pain ICD-10-CM: R07.9  ICD-9-CM: 786.50 5/31/2019 - Present        * (Principal) SDH (subdural hematoma) (HCC) ICD-10-CM: Z38.3C8H  ICD-9-CM: 432.1  3/18/2022 - Present        Post-traumatic subdural hematoma ([facility] ICD-10-CM: V17.9T0C  ICD-9-CM: 852.20  3/15/2022 - Present        Hypokalemic alkalosis ICD-10-CM: E87.3  ICD-9-CM: 276.3  3/15/2022 - Present        EKG, abnormal ICD-10-CM: R94.31  ICD-9-CM: 794.31  2/15/2022 - Present        Femur fracture, right ([facility] ICD-10-CM: H61.81ZH  ICD-9-CM: 821.00  2/11/2022 - Present        COPD with acute lower respiratory infection ([facility] ICD-10-CM: J44.0  ICD-9-CM: 496, 519.8  2/10/2022 - Present        Moderate to severe mitral regurgitation ICD-10-CM: I34.0  ICD-9-CM: 424.0  2/8/2022 - Present        Iron deficiency anemia due to chronic blood loss (Chronic) ICD-10-CM: D50.0  ICD-9-CM: 280.0  2/8/2022 - Present        Closed right hip fracture ([facility] ICD-10-CM: S72.001A  ICD-9-CM: 820.8  2/4/2022 - Present        Other fracture of right femur, initial encounter for closed fracture ([facility] ICD-10-CM: N66.5I5T  ICD-9-CM: 821.00  2/4/2022 - Present        Multiple closed anterior-posterior compression fractures of pelvis ([facility] ICD-10-CM: B76.71FV  ICD-9-CM: 808.44  2/2/2022 - Present        DDD (degenerative disc disease), lumbar ICD-10-CM: M51.36  ICD-9-CM: 722.52  2/2/2022 - Present        DNR (do not resuscitate) ICD-10-CM: Z66  ICD-9-CM: V49.86  11/9/2021 - Present    Overview Signed 11/9/2021 12:25 PM by Zac Helton, NP     POST form completed - DNR but full treatment, including intubation. No TRACH. No PEG. Acute bilateral low back pain without sciatica ICD-10-CM: M54.50  ICD-9-CM: 724.2, 338.19  11/9/2021 - Present        Palliative care patient ICD-10-CM: Z51.5  ICD-9-CM: V66.7  7/14/2021 - Present        Depression ICD-10-CM: F32. A  ICD-9-CM: 840  5/6/2021 - Present        Anxiety ICD-10-CM: F41.9  ICD-9-CM: 300.00  5/6/2021 - Present        Ischemic heart disease, hx pci, cath/pci last 5/2019 ICD-10-CM: I25.9  ICD-9-CM: 414.9  2/24/2021 - Present        H/O carotid endarterectomy ICD-10-CM: Z98.890  ICD-9-CM: V45.89  9/21/2020 - Present        Right ear pain ICD-10-CM: H92.01  ICD-9-CM: 388.70  9/21/2020 - Present        Pulmonary mass ICD-10-CM: R91.8  ICD-9-CM: 786.6  2/11/2020 - Present        Centrilobular emphysema ([address] ICD-10-CM: J43.2  ICD-9-CM: 492.8  12/20/2019 - Present        Carotid stenosis ICD-10-CM: I65.29  ICD-9-CM: 433.10  12/3/2019 - Present        Carotid stenosis, right ICD-10-CM: I65.21  ICD-9-CM: 433.10  12/3/2019 - Present        Hypertensive urgency ICD-10-CM: I16.0  ICD-9-CM: 401.9  6/8/2019 - Present        Chronic respiratory failure with hypoxia (HCC) (Chronic) ICD-10-CM: J96.11  ICD-9-CM: 518.83, 799.02  6/8/2019 - Present        CAD S/P percutaneous coronary angioplasty ICD-10-CM: I25.10, Z98.61  ICD-9-CM: 414.01, V45.82  5/31/2019 - Present        Ventricular ectopy ICD-10-CM: I49.3  ICD-9-CM: 427.69  5/9/2017 - Present        Chronic anxiety ICD-10-CM: F41.9  ICD-9-CM: 300.00  4/27/2017 - Present        Hyperlipidemia (Chronic) ICD-10-CM: E78.5  ICD-9-CM: 272.4  10/10/2016 - Present        Essential hypertension, benign (Chronic) ICD-10-CM: I10  ICD-9-CM: 401.1  10/10/2016 - Present        Coronary artery disease involving native coronary artery of native heart without angina pectoris ICD-10-CM: I25.10  ICD-9-CM: 414.01  10/10/2016 - Present        Hypoxemia ICD-10-CM: R09.02  ICD-9-CM: 799.02  8/25/2013 - Present    Overview Signed 2/5/2014  9:08 AM by Mounika Parikh NP     FILIBERTO 8/2013. Pt had over 2 hours 1/2 hours of desaturations at night. O2 was ordered for her to start at 2L in September, but she stated she was feeling better than when FILIBERTO was done and refused O2. FILIBERTO 12/2013: not performed secondary to patient factors.               Acute respiratory failure with hypoxia [facility]) ICD-10-CM: J96.01  ICD-9-CM: 518.81  8/22/2013 - Present        COPD, very severe (HCC) (Chronic) ICD-10-CM: J44.9  ICD-9-CM: 496  8/20/2013 - Present        Personal history of tobacco use ICD-10-CM: Z87.891  ICD-9-CM: V15.82  8/19/2013 - Present        History of MI (myocardial infarction) ICD-10-CM: I25.2  ICD-9-CM: 195  8/19/2013 - Present    Overview Addendum 8/30/2018  9:20 AM by Jeffrey Wong MD     STEMI 8/19/13:  Angioplasty for in-stent stenosis to LAD             RESOLVED: Chronic obstructive pulmonary disease ([facility] [address] ICD-10-CM: J44.9  ICD-9-CM: 496  10/10/2016 - 8/30/2018        RESOLVED: Acute systolic heart failure ([facility] [address] ICD-10-CM: I50.21  ICD-9-CM: 428.21  8/22/2013 - 8/30/2018        RESOLVED: Pulmonary hemorrhage ICD-10-CM: R04.89  ICD-9-CM: 786.30  8/20/2013 - 6/25/2019        RESOLVED: CAD (coronary artery disease) (Chronic) ICD-10-CM: I25.10  ICD-9-CM: 414.00  8/19/2013 - 8/3/2021        RESOLVED: COPD (chronic obstructive pulmonary disease) (HCC) (Chronic) ICD-10-CM: J44.9  ICD-9-CM: 496  8/19/2013 - 8/20/2013            S/p traumatic subdural hematoma     Plan / Recommendations / Medical Decision Making:     Continue daily physician / PA medical management:    Subdural hematoma - PT/OT; good rehab potential.     S/p right IT fracture 2/2022 now s/p fall with fx of the lesser trochanter; WBAT. No surgery required, per Dr Marikay Denver.     Hyperlipidemia - continue Lipitor and Zetia.     COPD - O2- and steroid-dependent, 1-2 L O2. Continue Symbicort, Spiriva and prednisone 15mg. Encourage IS. She is on prophylactic azithromycin 500mg MWF for inflammation due to COPD / emphysema. There was concern during hospitalization 2/2022 of pulmonary malignancy with a 2.8cm left perihilar lower lobe spiculated mass per CT chest 2/26/2022.  -3/26 sats low to upper 90s on 1-2 L of O2     Hx of MI - s/p stent to LAD and distal RCA 2/7/2022; continue statin, was on Effient at home. Plans to restart ASA and begin Plavix; will wait 2wks post SDH; this was a very small bleed.  ASA 81mg restarted on [facility] admission; monitor clinically. Seen by Dr Joni Wu 3/16.  -3/21 per Cardiology, Simona Gentleman I would like to start back dual antiplatelet therapy (using aspirin and clopidogrel) in 3 to 4 days if possible\"; per Neurosurgery, okay to start Plavix  -3/24 ASA / Plavix causing challenges with skin, continue to monitor  -3/25 bout of CP this AM controlled with NTG, doing well     Hypertension - BP fluctuating, managed medically. Continue Toprol XL, PRN SL NTG.  -3/22 BP fluctuatin-184/, no dizziness or orthostasis; increasing LE edema, resume torsemide 10mg PO daily; compression garments  -3/23 BP stable at 114-148/50-56, Toprol XL held this AM; monitor, continue torsemide and compression garments  -3/27 /51, asymptomatic    Anemia - Hgb 9.3 and stable.  -3/21 Hgb 8.3, drifting down; recheck in 2d  -3/25 Hgb 9.0 / Hct 29.5, progressing in a positive direction, B complex begun     Hypokalemia - resolved; monitor and replace if needed. -3/21 K+ 4.1 today  -3/25 electrolytes stable, BNP improving    Congestive heart failure, CHF - BNP markedly elevated at 4033.  -3/22 resume torsemide 10mg PO daily  -3/25 BNP 2917 and trending downward, continue torsemide    Osteoporosis - start calcium+Vitamin D every day. Consider the addition of medication as an outpatient in addition to resistance training and vibration plate therapy. Pain management - fairly recent right hip fracture. Will require regular pain assessment and comprehensive pain management. PRN oxycodone and APAP; history of T12-L1 compression fractures.     Pneumonia prophylaxis - incentive spirometer every hour while awake.     DVT risk / DVT prophylaxis - daily physician / PA exam to assess as patient is at increased risk for of thromboembolism. Mobilize as tolerated. Sequential pneumatic compression devices (SCDs) when in bed; thigh-high or knee-high thromboembolic deterrent hose when out of bed.  No anticoagulation due to SDH.  -3/21 on ASA since 3/19; okay to start Plavix per NSG     GERD - continue Protonix BID. At times may need additional antacids, Maalox prn. Depression - continue on Lexapro. At risk of depression exacerbation due to re-hospitalization and decline in functional mobility and independence. Resume PRN Ativan for anxiety.     General skin care / wound prevention - monitor general skin status daily per staff and physician / PA. At risk for failure due to impaired mobility. Patient with multiple bruises and friable skin.     Bladder program - voiding without difficulty.     Bowel program - at risk for constipation as a side effect of opioids, other medications, impaired mobility, etc. MiraLAX daily for regularity, Marisela-Colace for stool softener. PRN MOM, bisacodyl suppository or tablets for constipation.     Hyperthyroidism - continue Tapazole. Time spent was 25 minutes with over 1/2 in direct patient care/examination, consultation and coordination of care. Signed By: Héctor Blackwell PA-C    March 28, 2022      Physician Assistant with [facility]  Anh Gomes MD, Medical Director

## 2022-03-28 NOTE — PROGRESS NOTES
PHYSICAL THERAPY DAILY NOTE  Time In: 3472  Time Out: 1732  Patient Seen For: PM;Gait training;Patient education; Therapeutic exercise;Transfer training; Other (see progress notes)    Subjective: patient reporting she was tired after AM therapy and took a nap this PM. Reports she is not as dizzy this PM. Reports dizziness is worse in AM         Objective:Vital Signs:on 02 at 1 lpm, 02 sat 95% and HR 88 after ambulating 80 ft. Patient Vitals for the past 12 hrs:   Temp Pulse Resp BP SpO2   03/28/22 1315 98.6 °F (37 °C) 72 18 (!) 120/50 97 %   03/28/22 0725  65  126/63 98 %   03/28/22 0659 98 °F (36.7 °C) 62 18 (!) 142/56 98 %   03/28/22 0612     96 %     Pain level:No c/o pain during treatment  Pain location:NA  Pain interventions:NA    Patient education:,transfer training, gait training, balance training,fall precautions, body mechanics,activity pacing,energy conservation,orthostatic hypotension symptoms. Breathing techniques during gait. Patient verbalizing understanding and demonstrating understanding of patient education. Recommend follow up education. Interdisciplinary Communication:spoke with OT regarding progress towards goals      Other (comment) (Fall precautions, WBAT RLE)  GROSS ASSESSMENT Daily Assessment     NA       COGNITION Daily Assessment    Alert, able to follow commands,cooperating,participating, motivated, less anxiety during mobility today         BED/MAT MOBILITY Daily Assessment    Rolling Right : 0 (Not tested)  Rolling Left : 0 (Not tested)  Supine to Sit : 0 (Not tested)  Sit to Supine : 0 (Not tested)       TRANSFERS Daily Assessment   Increased time and effort to complete with cues for body mechanics   Transfer Type: SPT with walker  Other: commode transfers with RW and supervision.  independent with hygiene and clothing management  Transfer Assistance : 5 (Supervision/setup)  Sit to Stand Assistance: Supervision  Car Transfers: Not tested       GAIT Daily Assessment    Amount of Assistance: 5 (Stand-by assistance)  Distance (ft): 80 Feet (ft) x 2 sets of 40ft x 2 with 180 degree turn w/ patient managing 02 line)  Assistive Device: Walker, rolling   Gait training with one time cue for controlling gait balance with RR.      STEPS or STAIRS Daily Assessment   Increased time and effort to complete with cues for gait sequence leading up with LLE and down with RLE Steps/Stairs Ambulated (#): 1 (6 inch step x 1, 3 inch step x 2)  Level of Assist : 5 (Stand-by assistance)  Rail Use:  (with RW)       BALANCE Daily Assessment   No loss of balance during gait or transfer training. Sitting - Static: Good (unsupported)  Sitting - Dynamic: Good (unsupported)  Standing - Static: Fair  Standing - Dynamic : Impaired       WHEELCHAIR MOBILITY Daily Assessment    Curbs/Ramps Assist Required (FIM Score): 0 (Not tested)  Wheelchair Setup Assist Required : 0 (Not tested)       LOWER EXTREMITY EXERCISES Daily Assessment      Extremity: Both  Exercise Type #1: Other (comment) (LE motomed x 8 mins at level 2)  Level of Assist: Minimal assistance              Assessment: No signs of orthostatic hypotension this PM. Improving with ability to manage 02 line during gait training. Gait distance limited due to fatigue and SOB       Patient returned to room at end of treatment and remained up in recliner with LEs elevated and with needs in reach. 02 at 1 lpm    Plan of Care: Continue with POC and progress as tolerated.      Nicole Aguilar, PT  3/28/2022

## 2022-03-28 NOTE — PROGRESS NOTES
OT Daily Note  Time In 1346   Time Out 1444     Subjective: Pt c/o HA this afternoon. Pt agreeable to treatment. Pain: RN notified, presents to administer pain meds  Interdisciplinary Communication: Collaborated with PT to ensure progress towards POC and goals. Precautions: Falls, WBAT RLE     Patient Vitals for the past 8 hrs:   Temp Pulse Resp BP SpO2   03/28/22 1315 98.6 °F (37 °C) 72 18 (!) 120/50 97 %   03/28/22 0725  65  126/63 98 %   03/28/22 0659 98 °F (36.7 °C) 62 18 (!) 142/56 98 %           Functional Mobility    Score Comments   Sit to Stand 4: Supervision or touching A SBA   Transfer Assist 4: Supervision or touching A Transfer Type: SPT   Equipment: Rolling Walker   Comments: SBA         Self-Care    Score Comments   Toilet Transfer 4: Supervision or touching A Transfer Type: SPT   Equipment: Grab Bars   Comments: S   Toileting Hygiene 4: Supervision or touching A Output: urine x1  Comments:      Pt practiced grooming in sitting at sink with S/U to wash hands. Educated on washing hands with soap and water after toileted rather than utilizing hand . Activity Tolerance and Balance   Pt engaged in standing balance/tolerance task utilizing pipe tree to follow complex visual designs while standing at standing table. Pt with increased work of breathing noted at times, SpO2 88-97% on 0.5L O2 NC. Pt cued for pacing and rest breaks. Pt able to stand for 4 minutes 40 seconds then 3 minutes 38 seconds with rest breaks in between. Pt with forward flexed posture at times, tends to rest forearms on standing table. Cued to practice with good posture and no UE support on table to further address standing balance and core strengthening. Pt then engaged in game of Matchbook in standing at standing frame table to address standing balance, standing tolerance, self-pacing, and breathing technique. Pt able to stand for 8 minutes 44 seconds before requiring seated rest break.       Education   Activity pacing, Breathing technique, Functional transfer training, O2 nasal cannula management training and Safety awareness     Assessment: Patient requiring supplemental O2 this PM. Pt demonstrated good participation in OT treatment. Pt continues to benefit from skilled OT services to address remaining deficits and return back to baseline with improved independence and safety during I/ADLs. Patient ended session seated in wheelchair with PT. Plan: Continue OT POC.      Jessica Curtis OTR/MARIUSZ   3/28/2022

## 2022-03-28 NOTE — PROGRESS NOTES
OT Daily Note    Time In 0752   Time Out 0830     Subjective: Pt agreeable to treatment. Reports she is congested with productive cough today. Pain: No pain expressed  Interdisciplinary Communication: Communicated with RN and student RN to ensure progress towards POC and goals. Precautions: Falls, WBAT RLE     SpO2 stable on room air throughout session. Patient Vitals for the past 8 hrs:   Temp Pulse Resp BP SpO2   03/28/22 0725  65  126/63 98 %   03/28/22 0659 98 °F (36.7 °C) 62 18 (!) 142/56 98 %   03/28/22 0612     96 %         Mobility   Score Comments   Sit to Stand 4: Supervision or touching A SBA   Transfer Assist 4: Supervision or touching A Transfer Type: SPT   Equipment: Rolling Walker   Comments: SBA, pt with improved safety, able to manage O2 NC without cues. Ambulation 4: Supervision or touching A Equipment: Rolling Walker   Comments: SBA-CGA, pt with improved safety, able to manage O2 NC without cues. Activities of Daily Living    Score Comments   Oral Hygiene 6: Independent Pt independent in sitting, S in stance at sink with BSC behind her   Upper Body  Dressing 5: S/U or clean-up assist Items Applied: Pullover  Position: Unsupported Sitting   Lower Body Dressing 5: S/U or clean-up assist Items Applied: Underwear and Elastic pants  Position: Standing PRN and Unsupported Sitting  Adaptive Equipment: RW   Donning/Pioneer Footwear 5: S/U or clean-up assist Items Applied: Socks, Shoes with fasteners  and compression stockings  Adaptive Equipment: N/A  Comments: supported sitting in recliner   Toilet Transfer 4: Supervision or touching A Transfer Type: SPT   Equipment: Rolling Walker   Comments: S-SBA   [address] [place] 4: Supervision or touching A Output: urine x1  Comments: S   Education Activity pacing, O2 nasal cannula management training and Safety awareness     Assessment: Patient appears in good spirits. Appears much less anxious about vitals during dressing this morning. Practiced toileting, grooming in standing, and dressing on room air with O2 NC donned but around chin to practice O2 NC management during activity. Pt demonstrated good participation in OT treatment. Pt continues to benefit from skilled OT services to address remaining deficits and return back to baseline with improved independence and safety during I/ADLs. Patient ended session seated in recliner with call bell and needs within reach. Plan: Continue OT POC.      Alvaro Fernandez OTR/L   3/28/2022

## 2022-03-28 NOTE — PROGRESS NOTES
PHYSICAL THERAPY DAILY NOTE  Time In: 6740  Time Out: 2966  Patient Seen For: AM;Gait training;Patient education; Therapeutic exercise;Transfer training; Other (see progress notes)    Subjective: patient reporting she still gets dizzy when she first stands up if she has been sitting for a while. Objective:Vital Signs:on 02 at 1 lpm, 02 sat 92% and HR 88 after ambulating 80 ft. Sitting /58  Standing /48  Patient Vitals for the past 12 hrs:   Temp Pulse Resp BP SpO2   03/28/22 1315 98.6 °F (37 °C) 72 18 (!) 120/50 97 %   03/28/22 0725  65  126/63 98 %   03/28/22 0659 98 °F (36.7 °C) 62 18 (!) 142/56 98 %   03/28/22 0612     96 %     Pain level:No c/o pain during treatment  Pain location:NA  Pain interventions:NA    Patient education:,transfer training, gait training, balance training,fall precautions, body mechanics,activity pacing,energy conservation,orthostatic hypotension symptoms. Breathing techniques during gait. Patient verbalizing understanding and demonstrating understanding of patient education. Recommend follow up education. Interdisciplinary Communication:spoke with OT regarding progress towards goals      Other (comment) (Fall precautions, WBAT RLE)  GROSS ASSESSMENT Daily Assessment     NA       COGNITION Daily Assessment    Alert, able to follow commands,cooperating,participating, motivated, less anxiety during mobility today         BED/MAT MOBILITY Daily Assessment    Rolling Right : 0 (Not tested)  Rolling Left : 0 (Not tested)  Supine to Sit : 0 (Not tested)  Sit to Supine : 0 (Not tested)       TRANSFERS Daily Assessment   Increased time and effort to complete with cues for body mechanics   Transfer Type: SPT with walker  Other: commode transfers with RW and supervision.  independent with hygiene and clothing management  Transfer Assistance : 5 (Supervision/setup)  Sit to Stand Assistance: Supervision  Car Transfers: Not tested       GAIT Daily Assessment    Amount of Assistance: 5 (Stand-by assistance)  Distance (ft): 80 Feet (ft)  Assistive Device: Walker, rolling   Gait training with one time cue for controlling gait balance with RR.      STEPS or STAIRS Daily Assessment    Steps/Stairs Ambulated (#): 0  Level of Assist : 0 (Not tested)       BALANCE Daily Assessment   No loss of balance during gait or transfer training. Sitting - Static: Good (unsupported)  Sitting - Dynamic: Good (unsupported)  Standing - Static: Fair  Standing - Dynamic : Impaired       WHEELCHAIR MOBILITY Daily Assessment    Curbs/Ramps Assist Required (FIM Score): 0 (Not tested)  Wheelchair Setup Assist Required : 0 (Not tested)       LOWER EXTREMITY EXERCISES Daily Assessment   Increased time and effort to complete with multiple and frequent rest breaks. Cues for correct form   Extremity: Both  Exercise Type #1: Seated lower extremity strengthening  Sets Performed: 2  Reps Performed: 10  Level of Assist: Minimal assistance     SEATED EXERCISES Sets Reps Comments   Ankle Pumps 2 10    Hip Flexion 2 10    Long Arc Quads 2 10    Hip Adduction/Ball Squeeze 2 10    Hip Abduction with red Theraband 2 10    Hamstring Curls with red Theraband 2 10    Hip ext with red  t-band                        2         10         Assessment: Cont to have symptoms of orthostatic hypotension. Patient more aware of symptoms and demonstrating what to do if feeling symptoms. Patient remains at risk for falling due to orthostatic hypotension. Recommending 24 hour supervision when patient returns home       Patient returned to room at end of treatment and remained up in recliner with LEs elevated and with needs in reach. 02 at 1 lpm    Plan of Care: Continue with POC and progress as tolerated.      Ursula Quarles, PT  3/28/2022

## 2022-03-29 ENCOUNTER — APPOINTMENT (OUTPATIENT)
Dept: CT IMAGING | Age: 72
DRG: 949 | End: 2022-03-29
Attending: PHYSICAL MEDICINE & REHABILITATION
Payer: MEDICARE

## 2022-03-29 PROCEDURE — 77010033678 HC OXYGEN DAILY

## 2022-03-29 PROCEDURE — 74011250637 HC RX REV CODE- 250/637: Performed by: PHYSICAL MEDICINE & REHABILITATION

## 2022-03-29 PROCEDURE — 94640 AIRWAY INHALATION TREATMENT: CPT

## 2022-03-29 PROCEDURE — 97110 THERAPEUTIC EXERCISES: CPT

## 2022-03-29 PROCEDURE — 99232 SBSQ HOSP IP/OBS MODERATE 35: CPT | Performed by: PHYSICAL MEDICINE & REHABILITATION

## 2022-03-29 PROCEDURE — 3331090001 HH PPS REVENUE CREDIT

## 2022-03-29 PROCEDURE — 3331090002 HH PPS REVENUE DEBIT

## 2022-03-29 PROCEDURE — 74011250637 HC RX REV CODE- 250/637: Performed by: PHYSICIAN ASSISTANT

## 2022-03-29 PROCEDURE — 97530 THERAPEUTIC ACTIVITIES: CPT

## 2022-03-29 PROCEDURE — 74011636637 HC RX REV CODE- 636/637: Performed by: PHYSICAL MEDICINE & REHABILITATION

## 2022-03-29 PROCEDURE — 97116 GAIT TRAINING THERAPY: CPT

## 2022-03-29 PROCEDURE — 65310000000 HC RM PRIVATE REHAB

## 2022-03-29 PROCEDURE — 94760 N-INVAS EAR/PLS OXIMETRY 1: CPT

## 2022-03-29 PROCEDURE — 97535 SELF CARE MNGMENT TRAINING: CPT

## 2022-03-29 PROCEDURE — 70450 CT HEAD/BRAIN W/O DYE: CPT

## 2022-03-29 RX ORDER — CETIRIZINE HCL 10 MG
10 TABLET ORAL DAILY
Status: DISCONTINUED | OUTPATIENT
Start: 2022-03-30 | End: 2022-03-31 | Stop reason: HOSPADM

## 2022-03-29 RX ADMIN — ZINC 1 TABLET: TAB ORAL at 08:03

## 2022-03-29 RX ADMIN — ASPIRIN 81 MG: 81 TABLET, CHEWABLE ORAL at 08:03

## 2022-03-29 RX ADMIN — CLOPIDOGREL BISULFATE 75 MG: 75 TABLET ORAL at 08:03

## 2022-03-29 RX ADMIN — EZETIMIBE 10 MG: 10 TABLET ORAL at 08:03

## 2022-03-29 RX ADMIN — PANTOPRAZOLE SODIUM 40 MG: 40 TABLET, DELAYED RELEASE ORAL at 06:32

## 2022-03-29 RX ADMIN — BUTALBITAL, ACETAMINOPHEN, AND CAFFEINE 1 TABLET: 50; 325; 40 TABLET ORAL at 14:48

## 2022-03-29 RX ADMIN — TORSEMIDE 10 MG: 20 TABLET ORAL at 08:03

## 2022-03-29 RX ADMIN — BUDESONIDE AND FORMOTEROL FUMARATE DIHYDRATE 2 PUFF: 160; 4.5 AEROSOL RESPIRATORY (INHALATION) at 18:16

## 2022-03-29 RX ADMIN — BUDESONIDE AND FORMOTEROL FUMARATE DIHYDRATE 2 PUFF: 160; 4.5 AEROSOL RESPIRATORY (INHALATION) at 06:15

## 2022-03-29 RX ADMIN — GUAIFENESIN 600 MG: 600 TABLET ORAL at 20:55

## 2022-03-29 RX ADMIN — POLYETHYLENE GLYCOL 3350 17 G: 17 POWDER, FOR SOLUTION ORAL at 08:03

## 2022-03-29 RX ADMIN — ACETAMINOPHEN 650 MG: 325 TABLET ORAL at 06:33

## 2022-03-29 RX ADMIN — CETIRIZINE HYDROCHLORIDE 5 MG: 10 TABLET, FILM COATED ORAL at 08:03

## 2022-03-29 RX ADMIN — ATORVASTATIN CALCIUM 80 MG: 80 TABLET, FILM COATED ORAL at 20:55

## 2022-03-29 RX ADMIN — GUAIFENESIN 600 MG: 600 TABLET ORAL at 08:03

## 2022-03-29 RX ADMIN — PANTOPRAZOLE SODIUM 40 MG: 40 TABLET, DELAYED RELEASE ORAL at 16:29

## 2022-03-29 RX ADMIN — GABAPENTIN 100 MG: 100 CAPSULE ORAL at 20:56

## 2022-03-29 RX ADMIN — ESCITALOPRAM OXALATE 10 MG: 10 TABLET ORAL at 08:03

## 2022-03-29 RX ADMIN — GABAPENTIN 100 MG: 100 CAPSULE ORAL at 08:03

## 2022-03-29 RX ADMIN — LORAZEPAM 0.5 MG: 0.5 TABLET ORAL at 23:12

## 2022-03-29 RX ADMIN — METOPROLOL SUCCINATE 100 MG: 50 TABLET, EXTENDED RELEASE ORAL at 08:04

## 2022-03-29 RX ADMIN — PREDNISONE 15 MG: 10 TABLET ORAL at 08:03

## 2022-03-29 RX ADMIN — GABAPENTIN 100 MG: 100 CAPSULE ORAL at 16:29

## 2022-03-29 RX ADMIN — BUTALBITAL, ACETAMINOPHEN, AND CAFFEINE 1 TABLET: 50; 325; 40 TABLET ORAL at 20:56

## 2022-03-29 RX ADMIN — CALCIUM CARBONATE-CHOLECALCIFEROL TAB 250 MG-125 UNIT 1 TABLET: 250-125 TAB at 08:03

## 2022-03-29 RX ADMIN — ACETAMINOPHEN 650 MG: 325 TABLET ORAL at 10:32

## 2022-03-29 RX ADMIN — METHIMAZOLE 10 MG: 5 TABLET ORAL at 08:02

## 2022-03-29 RX ADMIN — TIOTROPIUM BROMIDE INHALATION SPRAY 2 PUFF: 3.12 SPRAY, METERED RESPIRATORY (INHALATION) at 06:18

## 2022-03-29 NOTE — PROGRESS NOTES
PHYSICAL THERAPY DAILY NOTE  Time In: 0966  Time Out: 1521  Patient Seen For: PM;Gait training;Patient education; Therapeutic exercise;Transfer training; Other (see progress notes)    Subjective: patient reporting she still has a headache. Reports the tylenol is not working. Requesting migraine medication. Reports she is still getting dizzy when she stands up. Objective:Vital Signs:on 02 at 1 lpm, 02 sat 92% and HR 81 after ambulating 80 ft. Sitting /52 HR 74, Standing /45 HR 75  Patient Vitals for the past 12 hrs:   Temp Pulse Resp BP SpO2   03/29/22 1545 98.5 °F (36.9 °C) 72 16 (!) 128/50 97 %   03/29/22 1130     94 %   03/29/22 0701 98.1 °F (36.7 °C) 61 18 124/62 97 %   03/29/22 0617     96 %     Pain level:c/o severe headache pain  Pain location:HA  Pain interventions:medication    Patient education:,transfer training, gait training, balance training,fall precautions, body mechanics,activity pacing,energy conservation,orthostatic hypotension symptoms. Breathing techniques during gait. Patient verbalizing understanding and demonstrating understanding of patient education. Recommend follow up education. Interdisciplinary Communication:spoke with RN regarding patient comments above.  RN administered medication for HA during PT treatment      Other (comment) (Fall precautions, WBAT RLE)  GROSS ASSESSMENT Daily Assessment     NA       COGNITION Daily Assessment    Alert, able to follow commands,cooperating,participating, motivated,         BED/MAT MOBILITY Daily Assessment    Rolling Right : 0 (Not tested)  Rolling Left : 0 (Not tested)  Supine to Sit : 0 (Not tested)  Sit to Supine : 0 (Not tested)       TRANSFERS Daily Assessment   Increased time and effort to complete with cues for body mechanics   Transfer Type: SPT with walker  Transfer Assistance : 5 (Supervision/setup)  Sit to Stand Assistance: Supervision  Car Transfers: Not tested       GAIT Daily Assessment    Amount of Assistance: 5 (Supervision/setup)  Distance (ft): 80 Feet (ft) (40ft x 2 with one 180 degree turn)  Assistive Device: Walker, rolling   Gait training with one time cue for controlling gait speed with RR. One time cue for managing 02 line      STEPS or STAIRS Daily Assessment    Steps/Stairs Ambulated (#): 0  Level of Assist : 0 (Not tested)       BALANCE Daily Assessment   No loss of balance during gait or transfer training. Sitting - Static: Good (unsupported)  Sitting - Dynamic: Good (unsupported)  Standing - Static: Fair  Standing - Dynamic : Impaired       WHEELCHAIR MOBILITY Daily Assessment    Curbs/Ramps Assist Required (FIM Score): 0 (Not tested)  Wheelchair Setup Assist Required : 0 (Not tested)       LOWER EXTREMITY EXERCISES Daily Assessment   Increased time and effort to complete with multiple and frequent rest breaks. Cues for correct form   Extremity: Both  Exercise Type #1: Seated lower extremity strengthening  Sets Performed: 2  Reps Performed: 10  Level of Assist: Minimal assistance     SEATED EXERCISES Sets Reps Comments   Ankle Pumps 2 10    Hip Flexion 2 10    Long Arc Quads 2 10    Hip Adduction/Ball Squeeze 2 10    Hip Abduction with red Theraband 2 10    Hamstring Curls with red Theraband 2 10    Hip ext with green  t-band                        2         10         Assessment: Cont to have symptoms of orthostatic hypotension. Patient more aware of symptoms and demonstrating what to do if feeling symptoms. Patient remains at risk for falling due to orthostatic hypotension. Recommending 24 hour supervision when patient returns home. Patient returned to room at end of treatment and remained up in recliner with LEs elevated and with needs in reach. 02 at 1 lpm    Plan of Care: Continue with POC and progress as tolerated.      Cherelle Last, PT  3/29/2022

## 2022-03-29 NOTE — PROGRESS NOTES
OT Daily Note  Time In 1116   Time Out 1202     Subjective: \"I'm doing well. \"  Pain: No pain expressed. Education: building activity tolerance  Interdisciplinary Communication: Collaborated with PT to ensure progress towards POC and goals. Precautions: Falls and Oxygen 1 L  Patient Vitals for the past 12 hrs:   Temp Pulse Resp BP SpO2   03/29/22 0701 98.1 °F (36.7 °C) 61 18 124/62 97 %   03/29/22 0617     96 %         Mobility   Score Comments   Sit to Stand 4: Supervision or touching A S   Transfer Assist 4: Supervision or touching A Transfer Type: SPT   Equipment: Rolling Walker   Comments: S     Activity Tolerance and Strengthening Daily Assessment   Pt completed 10 minutes on the ergometer frontwards and backwards with medium light resistance to increase UB strength and activity tolerance for integration into functional transfers. Self-Care Daily Assessment    Score Comments   Toilet Transfer 4: Supervision or touching A Transfer Type: SPT   Equipment: Rolling Walker   Comments: SBA   Toileting Hygiene 5: S/U or clean-up assist Output: urine   Comments:              Standing tolerance and Activity tolerance Daily Assessment   Pt stood 2x at standing table, for 5 minutes, and 11 minutes with occasional hand support on table and with supervision while engaging in card game. Pt engaged in card game of Skip-Jamir to address visual perceptual skills, problem solving, bilateral coordination, and critical thinking. Pt was to use cards in hand (5 cards total) to build piles in [place] in sequential order from 1-12. The object of the game was to get rid of the stock pile first to win. To end the turn, Pt was to discard x1 card in hand into the discard piles. Pt managed cards in hand, in discard pile, and in building piles. Pt required min verbal cuing to complete sequence properly and when to play cards. Assessment: Pt's SpO2 level remained at and above 94% on 1 L of O2 throughout session.  Pt appropriately paces herself on arm bike and requires 2 to 3 rest breaks during 10 minute cycle. Pt progressing with standing tolerance for integration into ADLs and IADLs. Pt demonstrated good participation and engagement in OT session. Pt continues to benefit from skilled OT services to address remaining deficits and return back to baseline with improved independence and safety during ADLs and IADLs. Patient ended session seated in recliner and call remote, phone, all needs within reach  Plan: Continue OT POC with focus on ADL/IADL skills, functional transfers, functional mobility, coordination, strength, static and dynamic balance, and activity tolerance to maximize safety and independence with ADLs and functional transfers.      Diego Smith, OTR/L  3/29/2022

## 2022-03-29 NOTE — PROGRESS NOTES
Problem: Falls - Risk of  Goal: *Absence of Falls  Description: Document Melissa Jon Fall Risk and appropriate interventions in the flowsheet.   Outcome: Progressing Towards Goal  Note: Fall Risk Interventions:  Mobility Interventions: Utilize walker, cane, or other assistive device         Medication Interventions: Patient to call before getting OOB    Elimination Interventions: Call light in reach    History of Falls Interventions: Door open when patient unattended         Problem: Patient Education: Go to Patient Education Activity  Goal: Patient/Family Education  Outcome: Progressing Towards Goal

## 2022-03-29 NOTE — PROGRESS NOTES
Problem: Falls - Risk of  Goal: *Absence of Falls  Description: Document Yumiko Stoner Fall Risk and appropriate interventions in the flowsheet.   Outcome: Progressing Towards Goal  Note: Fall Risk Interventions:  Mobility Interventions: Patient to call before getting OOB,Utilize walker, cane, or other assistive device         Medication Interventions: Patient to call before getting OOB    Elimination Interventions: Call light in reach,Patient to call for help with toileting needs,Toileting schedule/hourly rounds    History of Falls Interventions: Consult care management for discharge planning         Problem: Patient Education: Go to Patient Education Activity  Goal: Patient/Family Education  Outcome: Progressing Towards Goal

## 2022-03-29 NOTE — PROGRESS NOTES
PHYSICAL THERAPY DAILY NOTE  Time In: 1030  Time Out: 1113  Patient Seen For: AM;Balance activities;Gait training;Patient education; Therapeutic exercise;Transfer training; Other (see progress notes)    Subjective: patient reporting she still gets dizzy when she first stands up if she has been sitting for a while. Reports she has a bad headache this AM. Requesting tylenol. Reports she has a headache every morning. Asking what is she going to due if she has dizziness at home when alone and needs to go to the bathroom. Recommended patient place [facility] bedside the recliner she sleeps in when at home alone and avoid walking to/from bathroom. Objective:Vital Signs:on 02 at 1 lpm, 02 sat 90% and HR 88 after ambulating 160 ft. Sitting /62   Patient Vitals for the past 12 hrs:   Temp Pulse Resp BP SpO2   03/29/22 0701 98.1 °F (36.7 °C) 61 18 124/62 97 %   03/29/22 0617     96 %     Pain level:c/o severe headache pain  Pain location:HA  Pain interventions:medication    Patient education:,transfer training, gait training, balance training,fall precautions, body mechanics,activity pacing,energy conservation,orthostatic hypotension symptoms. Breathing techniques during gait. Patient verbalizing understanding and demonstrating understanding of patient education. Recommend follow up education.     Interdisciplinary Communication:spoke with OT regarding progress towards goals      Other (comment) (Fall precautions, WBAT RLE)  GROSS ASSESSMENT Daily Assessment     NA       COGNITION Daily Assessment    Alert, able to follow commands,cooperating,participating, motivated,         BED/MAT MOBILITY Daily Assessment    Rolling Right : 0 (Not tested)  Rolling Left : 0 (Not tested)  Supine to Sit : 0 (Not tested)  Sit to Supine : 0 (Not tested)       TRANSFERS Daily Assessment   Increased time and effort to complete with cues for body mechanics   Transfer Type: SPT with walker  Transfer Assistance : 5 (Supervision/setup)  Sit to Stand Assistance: Supervision  Car Transfers: Not tested       GAIT Daily Assessment    Amount of Assistance: 5 (Stand-by assistance)  Distance (ft): 160 Feet (ft) (160ft x 1  50 ft x 1 with 5 min rest intervals)  Assistive Device: Walker, rolling   Gait training with one time cue for controlling gait speed with RR.      STEPS or STAIRS Daily Assessment    Steps/Stairs Ambulated (#): 0  Level of Assist : 0 (Not tested)       BALANCE Daily Assessment   No loss of balance during gait or transfer training. Sitting - Static: Good (unsupported)  Sitting - Dynamic: Good (unsupported)  Standing - Static: Fair  Standing - Dynamic : Impaired       WHEELCHAIR MOBILITY Daily Assessment    Curbs/Ramps Assist Required (FIM Score): 0 (Not tested)  Wheelchair Setup Assist Required : 0 (Not tested)       LOWER EXTREMITY EXERCISES Daily Assessment   Increased time and effort to complete with multiple and frequent rest breaks. Cues for correct form   Extremity: Both  Exercise Type #1: Seated lower extremity strengthening  Sets Performed: 2  Reps Performed: 10  Level of Assist: Minimal assistance     SEATED EXERCISES Sets Reps Comments   Ankle Pumps 2 10    Hip Flexion 2 10    Long Arc Quads 2 10    Hip Adduction/Ball Squeeze 2 10    Hip Abduction with red Theraband 2 10    Hamstring Curls with red Theraband 2 10    Hip ext with red  t-band                        2         10         Assessment: Cont to have symptoms of orthostatic hypotension. Patient more aware of symptoms and demonstrating what to do if feeling symptoms. Patient remains at risk for falling due to orthostatic hypotension. Recommending 24 hour supervision when patient returns home       Patient returned to room at end of treatment and remained up in recliner with LEs elevated and with needs in reach. 02 at 1 lpm    Plan of Care: Continue with POC and progress as tolerated.      Shahida Section, PT  3/29/2022

## 2022-03-29 NOTE — PROGRESS NOTES
Margie Loja MD  Medical Director  [address] [facility], [address] [facility]  Tel: [phone] J[address] Elan PROGRESS NOTE    [facility]  Admit Date: 3/18/2022  Admit Diagnosis:   SDH (subdural hematoma) ([facility] [address] [S06.5X9A]    Subjective     Patient seen and examined. Still with right frontal headache. No associated visual or neuro changes. No photophobia. Does not keep her awake at noc. Tylenol + ativan help but makes her too tired. HAs no worse but no better.      Objective:     Current Facility-Administered Medications   Medication Dose Route Frequency    [START ON 3/30/2022] cetirizine (ZYRTEC) tablet 10 mg  10 mg Oral DAILY    butalbital-acetaminophen-caffeine (FIORICET, ESGIC) -40 mg per tablet 1 Tablet  1 Tablet Oral Q6H PRN    multivitamin w ZN (STRESSTABS W ZINC) tablet  1 Tablet Oral DAILY    calcium-vitamin D (OS-DOMINGO +D3) 250 mg-125 unit per tablet 1 Tablet  1 Tablet Oral DAILY    torsemide (DEMADEX) tablet 10 mg  10 mg Oral DAILY    clopidogreL (PLAVIX) tablet 75 mg  75 mg Oral DAILY    azithromycin (ZITHROMAX) tablet 500 mg  500 mg Oral Q MON, WED & FRI    budesonide-formoteroL (SYMBICORT) 160-4.5 mcg/actuation HFA inhaler 2 Puff  2 Puff Inhalation BID RT    escitalopram oxalate (LEXAPRO) tablet 10 mg  10 mg Oral DAILY    ezetimibe (ZETIA) tablet 10 mg  10 mg Oral DAILY    gabapentin (NEURONTIN) capsule 100 mg  100 mg Oral TID    LORazepam (ATIVAN) tablet 0.5 mg  0.5 mg Oral Q8H PRN    methIMAzole (TAPAZOLE) tablet 10 mg  10 mg Oral DAILY    metoprolol succinate (TOPROL-XL) XL tablet 100 mg  100 mg Oral DAILY    nitroglycerin (NITROSTAT) tablet 0.4 mg  0.4 mg SubLINGual PRN    ondansetron (ZOFRAN ODT) tablet 4 mg  4 mg Oral Q6H PRN    oxyCODONE IR (ROXICODONE) tablet 5 mg  5 mg Oral Q4H PRN    pantoprazole (PROTONIX) tablet 40 mg  40 mg Oral ACB&D    polyethylene glycol (MIRALAX) packet 17 g  17 g Oral DAILY    predniSONE (DELTASONE) tablet 15 mg  15 mg Oral DAILY WITH BREAKFAST    tiotropium bromide (SPIRIVA RESPIMAT) 2.5 mcg /actuation  2 Puff Inhalation DAILY    traZODone (DESYREL) tablet 50 mg  50 mg Oral QHS PRN    acetaminophen (TYLENOL) tablet 650 mg  650 mg Oral Q4H PRN    atorvastatin (LIPITOR) tablet 80 mg  80 mg Oral QHS    bisacodyL (DULCOLAX) tablet 5 mg  5 mg Oral DAILY PRN    guaiFENesin ER (MUCINEX) tablet 600 mg  600 mg Oral Q12H    aspirin chewable tablet 81 mg  81 mg Oral DAILY       Review of Systems:   Denies chest pain, shortness of breath, cough,  visual problems, abdominal pain, dysuria, calf pain. Pertinent positives are as noted in the HPI, ROS unremarkable otherwise. Visit Vitals  /62 (BP 1 Location: Right upper arm, BP Patient Position: Sitting)   Pulse 61   Temp 98.1 °F (36.7 °C)   Resp 18   Wt 131 lb 6.4 oz (59.6 kg)   SpO2 97%   BMI 19.98 kg/m²        Physical Exam:   General: Alert and age appropriately oriented. No acute cardiorespiratory distress. HEENT: Normocephalic, no scleral icterus. Oral mucosa moist without cyanosis. Lungs: Clear to auscultation bilaterally. Respiration even and unlabored. Heart: Regular rate and rhythm, S1, S2. No murmurs, clicks, rub or gallops. Abdomen: Soft, non-tender, not distended. Bowel sounds normoactive. No organomegaly. Genitourinary: Deferred. Neuromuscular:      No gross focal motor deficits noted. Skin/extremity: No rashes, no erythema. No calf tenderness B LE.   No edema                                                                              Functional Assessment:          Balance  Sitting - Static: Good (unsupported) (03/28/22 1550)  Sitting - Dynamic: Good (unsupported) (03/28/22 1550)  Standing - Static: Fair (03/28/22 1550)  Standing - Dynamic : Impaired (03/28/22 1550)                     Jaja Espino Fall Risk Assessment:  Jaja Espino Fall Risk  Mobility: Ambulates or transfers with assist devices or assistance (03/29/22 5943)  Mobility Interventions: Patient to call before getting OOB;Utilize walker, cane, or other assistive device (03/29/22 1283)  Mentation: Alert, oriented x 3 (03/29/22 4396)  Medication: Patient receiving anticonvulsants, sedatives(tranquilizers), psychotropics or hypnotics, hypoglycemics, narcotics, sleep aids, antihypertensives, laxatives, or diuretics (03/29/22 1954)  Medication Interventions: Patient to call before getting OOB (03/29/22 0959)  Elimination: Needs assistance with toileting (03/29/22 8979)  Elimination Interventions: Call light in reach; Patient to call for help with toileting needs; Toileting schedule/hourly rounds (03/29/22 8052)  Prior Fall History: Before admission in past 12 months _home or previous inpatient care) (03/29/22 9665)  History of Falls Interventions: Consult care management for discharge planning (03/29/22 0712)  Total Score: 4 (03/29/22 0712)  Standard Fall Precautions: Yes (03/23/22 2010)  High Fall Risk: Yes (03/29/22 2802)     Speech Assessment:         Ambulation:  Gait  Distance (ft): 80 Feet (ft) (40ft x 2 with 180 degree turn w/ patient managing 02 line) (03/28/22 1550)  Assistive Device: Walker, rolling (03/28/22 1550)  Rail Use:  (with RW) (03/28/22 1550)     Labs/Studies:  No results found for this or any previous visit (from the past 67 hour(s)).     Assessment:     Problem List as of 3/29/2022 Date Reviewed: 3/21/2022          Codes Class Noted - Resolved    Chest pain ICD-10-CM: R07.9  ICD-9-CM: 786.50  5/31/2019 - Present        * (Principal) SDH (subdural hematoma) ([facility] ICD-10-CM: O69.6S9H  ICD-9-CM: 432.1  3/18/2022 - Present        Post-traumatic subdural hematoma ([facility] ICD-10-CM: J20.1S8Q  ICD-9-CM: 852.20  3/15/2022 - Present        Hypokalemic alkalosis ICD-10-CM: E87.3  ICD-9-CM: 276.3  3/15/2022 - Present        EKG, abnormal ICD-10-CM: R94.31  ICD-9-CM: 794.31  2/15/2022 - Present        Femur fracture, right ([address] ICD-10-CM: S72.91XA  ICD-9-CM: 821.00  2/11/2022 - Present        COPD with acute lower respiratory infection ([facility] ICD-10-CM: J44.0  ICD-9-CM: 496, 519.8  2/10/2022 - Present        Moderate to severe mitral regurgitation ICD-10-CM: I34.0  ICD-9-CM: 424.0  2/8/2022 - Present        Iron deficiency anemia due to chronic blood loss (Chronic) ICD-10-CM: D50.0  ICD-9-CM: 280.0  2/8/2022 - Present        Closed right hip fracture ([facility] [address] ICD-10-CM: S72.001A  ICD-9-CM: 820.8  2/4/2022 - Present        Other fracture of right femur, initial encounter for closed fracture ([facility] ICD-10-CM: C84.6B7M  ICD-9-CM: 821.00  2/4/2022 - Present        Multiple closed anterior-posterior compression fractures of pelvis ([facility] ICD-10-CM: S52.12TG  ICD-9-CM: 808.44  2/2/2022 - Present        DDD (degenerative disc disease), lumbar ICD-10-CM: M51.36  ICD-9-CM: 722.52  2/2/2022 - Present        DNR (do not resuscitate) ICD-10-CM: Z66  ICD-9-CM: V49.86  11/9/2021 - Present    Overview Signed 11/9/2021 12:25 PM by Crissie Estimable, NP     POST form completed - DNR but full treatment, including intubation. No TRACH. No PEG. Acute bilateral low back pain without sciatica ICD-10-CM: M54.50  ICD-9-CM: 724.2, 338.19  11/9/2021 - Present        Palliative care patient ICD-10-CM: Z51.5  ICD-9-CM: V66.7  7/14/2021 - Present        Depression ICD-10-CM: F32. A  ICD-9-CM: 090  5/6/2021 - Present        Anxiety ICD-10-CM: F41.9  ICD-9-CM: 300.00  5/6/2021 - Present        Ischemic heart disease, hx pci, cath/pci last 5/2019 ICD-10-CM: I25.9  ICD-9-CM: 414.9  2/24/2021 - Present        H/O carotid endarterectomy ICD-10-CM: Z98.890  ICD-9-CM: V45.89  9/21/2020 - Present        Right ear pain ICD-10-CM: H92.01  ICD-9-CM: 388.70  9/21/2020 - Present        Pulmonary mass ICD-10-CM: R91.8  ICD-9-CM: 786.6  2/11/2020 - Present        Centrilobular emphysema ([facility] [address] ICD-10-CM: J43.2  ICD-9-CM: 492.8  12/20/2019 - Present        Carotid stenosis ICD-10-CM: I65.29  ICD-9-CM: 433.10  12/3/2019 - Present        Carotid stenosis, right ICD-10-CM: I65.21  ICD-9-CM: 433.10  12/3/2019 - Present        Hypertensive urgency ICD-10-CM: I16.0  ICD-9-CM: 401.9  6/8/2019 - Present        Chronic respiratory failure with hypoxia (HCC) (Chronic) ICD-10-CM: J96.11  ICD-9-CM: 518.83, 799.02  6/8/2019 - Present        CAD S/P percutaneous coronary angioplasty ICD-10-CM: I25.10, Z98.61  ICD-9-CM: 414.01, V45.82  5/31/2019 - Present        Ventricular ectopy ICD-10-CM: I49.3  ICD-9-CM: 427.69  5/9/2017 - Present        Chronic anxiety ICD-10-CM: F41.9  ICD-9-CM: 300.00  4/27/2017 - Present        Hyperlipidemia (Chronic) ICD-10-CM: E78.5  ICD-9-CM: 272.4  10/10/2016 - Present        Essential hypertension, benign (Chronic) ICD-10-CM: I10  ICD-9-CM: 401.1  10/10/2016 - Present        Coronary artery disease involving native coronary artery of native heart without angina pectoris ICD-10-CM: I25.10  ICD-9-CM: 414.01  10/10/2016 - Present        Hypoxemia ICD-10-CM: R09.02  ICD-9-CM: 799.02  8/25/2013 - Present    Overview Signed 2/5/2014  9:08 AM by Clayton Morales NP     FILIBERTO 8/2013. Pt had over 2 hours 1/2 hours of desaturations at night. O2 was ordered for her to start at 2L in September, but she stated she was feeling better than when FILIBERTO was done and refused O2. FILIBERTO 12/2013: not performed secondary to patient factors.               Acute respiratory failure with hypoxia [facility]) ICD-10-CM: J96.01  ICD-9-CM: 518.81  8/22/2013 - Present        COPD, very severe (HCC) (Chronic) ICD-10-CM: J44.9  ICD-9-CM: 496  8/20/2013 - Present        Personal history of tobacco use ICD-10-CM: E14.588  ICD-9-CM: V15.82  8/19/2013 - Present        History of MI (myocardial infarction) ICD-10-CM: I25.2  ICD-9-CM: 560  8/19/2013 - Present    Overview Addendum 8/30/2018  9:20 AM by Charbel Wild MD     STEMI 8/19/13:  Angioplasty for in-stent stenosis to LAD             RESOLVED: Chronic obstructive pulmonary disease ([facility] ICD-10-CM: J44.9  ICD-9-CM: 457  10/10/2016 - 8/30/2018        RESOLVED: Acute systolic heart failure ([facility] ICD-10-CM: I50.21  ICD-9-CM: 428.21  8/22/2013 - 8/30/2018        RESOLVED: Pulmonary hemorrhage ICD-10-CM: R04.89  ICD-9-CM: 786.30  8/20/2013 - 6/25/2019        RESOLVED: CAD (coronary artery disease) (Chronic) ICD-10-CM: I25.10  ICD-9-CM: 414.00  8/19/2013 - 8/3/2021        RESOLVED: COPD (chronic obstructive pulmonary disease) (HCC) (Chronic) ICD-10-CM: J44.9  ICD-9-CM: 496  8/19/2013 - 8/20/2013                  S/p traumatic subdural hematoma     Plan / Recommendations / Medical Decision Making:      Continue daily physician / PA medical management:     Subdural hematoma - PT/OT; good rehab potential.  -3/29 intractable headaches, right frontal. Will repeat Head CT although doubt new or worsening bleed at this point. May be vasospasm but suspect post concussive headaches; encouraged her to try the [facility] [place]     S/p right IT fracture 2/2022 now s/p fall with fx of the lesser trochanter; WBAT. No surgery required, per Dr Meryl Kay.     Hyperlipidemia - continue Lipitor and Zetia.     COPD - O2- and steroid-dependent, 1-2 L O2. Continue Symbicort, Spiriva and prednisone 15mg. Encourage IS. She is on prophylactic azithromycin 500mg MWF for inflammation due to COPD / emphysema. There was concern during hospitalization 2/2022 of pulmonary malignancy with a 2.8cm left perihilar lower lobe spiculated mass per CT chest 2/26/2022.  -3/26 sats low to upper 90s on 1-2 L of O2  -3/29 was able to participate in therapy on 1/2L this a.m.     Hx of MI - s/p stent to LAD and distal RCA 2/7/2022; continue statin, was on Effient at home. Plans to restart ASA and begin Plavix; will wait 2wks post SDH; this was a very small bleed. ASA 81mg restarted on [facility] admission; monitor clinically.  Seen by Dr Stacy Gallegos 3/16.  -3/21 per Cardiology, Jessica Willis I would like to start back dual antiplatelet therapy (using aspirin and clopidogrel) in 3 to 4 days if possible\"; per Neurosurgery, okay to start Plavix  -3/24 ASA / Plavix causing challenges with skin, continue to monitor  -3/25 bout of CP this AM controlled with NTG, doing well  -3/29 no recent complaints of cp     Hypertension - BP fluctuating, managed medically. Continue Toprol XL, PRN SL NTG.  -3/22 BP fluctuatin-184/, no dizziness or orthostasis; increasing LE edema, resume torsemide 10mg PO daily; compression garments  -3/23 BP stable at 114-148/50-56, Toprol XL held this AM; monitor, continue torsemide and compression garments  -3/27 /51, asymptomatic  -3/29 /62 stable     Anemia - Hgb 9.3 and stable.  -3/21 Hgb 8.3, drifting down; recheck in 2d  -3/25 Hgb 9.0 / Hct 29.5, progressing in a positive direction, B complex begun     Hypokalemia - resolved; monitor and replace if needed. -3/21 K+ 4.1 today  -3/25 electrolytes stable, BNP improving     Congestive heart failure, CHF - BNP markedly elevated at 4033.  -3/22 resume torsemide 10mg PO daily  -3/25 BNP 2917 and trending downward, continue torsemide     Osteoporosis - start calcium+Vitamin D every day. Consider the addition of medication as an outpatient in addition to resistance training and vibration plate therapy.     Pain management - fairly recent right hip fracture. Will require regular pain assessment and comprehensive pain management. PRN oxycodone and APAP; history of T12-L1 compression fractures.     Pneumonia prophylaxis - incentive spirometer every hour while awake.     DVT risk / DVT prophylaxis - daily physician / PA exam to assess as patient is at increased risk for of thromboembolism. Mobilize as tolerated. Sequential pneumatic compression devices (SCDs) when in bed; thigh-high or knee-high thromboembolic deterrent hose when out of bed. No anticoagulation due to SDH.  -3/21 on ASA since 3/19; okay to start Plavix per NSG     GERD - continue Protonix BID.  At times may need additional antacids, Maalox prn.     Depression - continue on Lexapro. At risk of depression exacerbation due to re-hospitalization and decline in functional mobility and independence. Resume PRN Ativan for anxiety.     General skin care / wound prevention - monitor general skin status daily per staff and physician / PA. At risk for failure due to impaired mobility. Patient with multiple bruises and friable skin.     Bladder program - voiding without difficulty.     Bowel program - at risk for constipation as a side effect of opioids, other medications, impaired mobility, etc. MiraLAX daily for regularity, Marisela-Colace for stool softener. PRN MOM, bisacodyl suppository or tablets for constipation.     Hyperthyroidism - continue Tapazole.     Time spent was 25 minutes with over 1/2 in direct patient care/examination, consultation and coordination of care.      Signed By: Fabrice Garcia MD     March 29, 2022

## 2022-03-29 NOTE — PROGRESS NOTES
OT Daily Note    Time In 0701   Time Out 0745     Subjective: Pt reports she had a good night sleep. Endorses HA this morning. Pt agreeable to treatment. Pain: Pt tolerated head ache well throughout session  Interdisciplinary Communication: Communicated with RN to ensure progress towards POC and goals. Precautions: Falls, WBAT RLE     Patient Vitals for the past 8 hrs:   Temp Pulse Resp BP SpO2   03/29/22 0701 98.1 °F (36.7 °C) 61 18 124/62 97 %   03/29/22 0617     96 %     Pt required 0.5L O2 NC this morning during shower/ADL. Mobility   Score Comments   Sit to Stand 4: Supervision or touching A SBA-S   Transfer Assist 4: Supervision or touching A Transfer Type: SPT   Equipment: Rolling Walker and Grab Bars   Comments: SBA-S, pt with improving safety and O2 NC management, still needs cues for safety, pacing, and O2 NC management      Activities of Daily Living    Score Comments   Oral Hygiene 6: Independent    Bathing 5: S/U or clean-up assist Type of Shower: Shower  Position: Standing PRN and Unsupported Sitting   Adaptive Equipment: Tub Transfer Bench and Grab Bars    Upper Body  Dressing 5: S/U or clean-up assist Items Applied: Pullover and Bra  Position: Supported Sitting   Lower Body Dressing 4: Supervision or touching A Items Applied: Underwear and Elastic pants  Position: Supported sitting and Standing PRN  Adaptive Equipment: RW and W/C  Comments: S-S/U   Donning/[place] Footwear 5: S/U or clean-up assist Items Applied: Socks  Adaptive Equipment: N/A  Comments: supported sitting in recliner   Education AE/DME training, Activity pacing, Fall precautions, O2 nasal cannula management training, Safety awareness and Wheelchair mobility/management     Assessment: Patient mobilizing in w/c in bathroom, ran over her O2 NC and got it caught underneath w/c. Required mod verbal cueing to get O2 NC untangled, cues for problem solving, line management, and w/c management.  Pt reports she has w/c a home, so practiced pt untangling herself in preparation for d/c. Pt demonstrated good participation in OT treatment. Pt continues to benefit from skilled OT services to address remaining deficits and return back to baseline with improved independence and safety during I/ADLs. Patient ended session seated in recliner with call bell and needs within reach. Plan: Continue OT POC.      Ganesh Morrissey OTR/L   3/29/2022

## 2022-03-30 PROCEDURE — 97530 THERAPEUTIC ACTIVITIES: CPT

## 2022-03-30 PROCEDURE — 94640 AIRWAY INHALATION TREATMENT: CPT

## 2022-03-30 PROCEDURE — 97535 SELF CARE MNGMENT TRAINING: CPT

## 2022-03-30 PROCEDURE — 74011636637 HC RX REV CODE- 636/637: Performed by: PHYSICAL MEDICINE & REHABILITATION

## 2022-03-30 PROCEDURE — 74011250637 HC RX REV CODE- 250/637: Performed by: PHYSICAL MEDICINE & REHABILITATION

## 2022-03-30 PROCEDURE — 97116 GAIT TRAINING THERAPY: CPT

## 2022-03-30 PROCEDURE — 77010033678 HC OXYGEN DAILY

## 2022-03-30 PROCEDURE — 97110 THERAPEUTIC EXERCISES: CPT

## 2022-03-30 PROCEDURE — 94760 N-INVAS EAR/PLS OXIMETRY 1: CPT

## 2022-03-30 PROCEDURE — 3331090001 HH PPS REVENUE CREDIT

## 2022-03-30 PROCEDURE — 74011250637 HC RX REV CODE- 250/637: Performed by: PHYSICIAN ASSISTANT

## 2022-03-30 PROCEDURE — 99232 SBSQ HOSP IP/OBS MODERATE 35: CPT | Performed by: PHYSICAL MEDICINE & REHABILITATION

## 2022-03-30 PROCEDURE — 65310000000 HC RM PRIVATE REHAB

## 2022-03-30 PROCEDURE — 3331090002 HH PPS REVENUE DEBIT

## 2022-03-30 RX ORDER — PANTOPRAZOLE SODIUM 40 MG/1
40 TABLET, DELAYED RELEASE ORAL 2 TIMES DAILY
Qty: 180 TABLET | Refills: 0 | Status: SHIPPED | OUTPATIENT
Start: 2022-03-30

## 2022-03-30 RX ORDER — BUTALBITAL, ACETAMINOPHEN AND CAFFEINE 50; 325; 40 MG/1; MG/1; MG/1
1 TABLET ORAL
Qty: 60 TABLET | Refills: 1 | Status: SHIPPED | OUTPATIENT
Start: 2022-03-30

## 2022-03-30 RX ORDER — ALBUTEROL SULFATE 0.83 MG/ML
2.5 SOLUTION RESPIRATORY (INHALATION)
Qty: 360 ML | Refills: 11 | Status: SHIPPED | OUTPATIENT
Start: 2022-03-30 | End: 2022-03-31

## 2022-03-30 RX ORDER — TORSEMIDE 10 MG/1
10 TABLET ORAL DAILY
Qty: 90 TABLET | Refills: 3 | Status: SHIPPED | OUTPATIENT
Start: 2022-03-30

## 2022-03-30 RX ORDER — METOPROLOL SUCCINATE 100 MG/1
100 TABLET, EXTENDED RELEASE ORAL DAILY
Qty: 90 TABLET | Refills: 0 | Status: SHIPPED | OUTPATIENT
Start: 2022-03-30

## 2022-03-30 RX ORDER — GUAIFENESIN 100 MG/5ML
81 LIQUID (ML) ORAL DAILY
Qty: 90 TABLET | Refills: 2 | Status: SHIPPED | OUTPATIENT
Start: 2022-03-31

## 2022-03-30 RX ORDER — CETIRIZINE HCL 10 MG
10 TABLET ORAL
Qty: 90 TABLET | Refills: 1 | Status: SHIPPED | OUTPATIENT
Start: 2022-03-30

## 2022-03-30 RX ORDER — TRAZODONE HYDROCHLORIDE 50 MG/1
50 TABLET ORAL
Qty: 30 TABLET | Refills: 3 | Status: SHIPPED | OUTPATIENT
Start: 2022-03-30

## 2022-03-30 RX ORDER — GABAPENTIN 100 MG/1
100 CAPSULE ORAL 3 TIMES DAILY
Qty: 90 CAPSULE | Refills: 0 | Status: SHIPPED | OUTPATIENT
Start: 2022-03-30 | End: 2022-04-29

## 2022-03-30 RX ORDER — CALCIUM CARBONATE/VITAMIN D3 250-3.125
1 TABLET ORAL DAILY
Qty: 90 TABLET | Refills: 3 | Status: SHIPPED | OUTPATIENT
Start: 2022-03-31

## 2022-03-30 RX ORDER — METHIMAZOLE 10 MG/1
10 TABLET ORAL DAILY
Qty: 30 TABLET | Refills: 0 | Status: SHIPPED | OUTPATIENT
Start: 2022-03-30

## 2022-03-30 RX ORDER — ESCITALOPRAM OXALATE 10 MG/1
10 TABLET ORAL DAILY
Qty: 30 TABLET | Refills: 1 | Status: SHIPPED | OUTPATIENT
Start: 2022-03-30 | End: 2022-04-14 | Stop reason: SDUPTHER

## 2022-03-30 RX ORDER — LORAZEPAM 0.5 MG/1
0.5 TABLET ORAL
Qty: 20 TABLET | Refills: 0 | Status: SHIPPED | OUTPATIENT
Start: 2022-03-30

## 2022-03-30 RX ORDER — PREDNISONE 5 MG/1
15 TABLET ORAL
Qty: 90 TABLET | Refills: 1 | Status: SHIPPED | OUTPATIENT
Start: 2022-03-31

## 2022-03-30 RX ORDER — CLOPIDOGREL BISULFATE 75 MG/1
75 TABLET ORAL DAILY
Qty: 90 TABLET | Refills: 3 | Status: SHIPPED | OUTPATIENT
Start: 2022-03-31 | End: 2022-04-20 | Stop reason: SDUPTHER

## 2022-03-30 RX ADMIN — GABAPENTIN 100 MG: 100 CAPSULE ORAL at 08:12

## 2022-03-30 RX ADMIN — BUTALBITAL, ACETAMINOPHEN, AND CAFFEINE 1 TABLET: 50; 325; 40 TABLET ORAL at 11:48

## 2022-03-30 RX ADMIN — BUTALBITAL, ACETAMINOPHEN, AND CAFFEINE 1 TABLET: 50; 325; 40 TABLET ORAL at 05:42

## 2022-03-30 RX ADMIN — PANTOPRAZOLE SODIUM 40 MG: 40 TABLET, DELAYED RELEASE ORAL at 16:17

## 2022-03-30 RX ADMIN — ZINC 1 TABLET: TAB ORAL at 08:13

## 2022-03-30 RX ADMIN — CETIRIZINE HYDROCHLORIDE 10 MG: 10 TABLET, FILM COATED ORAL at 08:13

## 2022-03-30 RX ADMIN — GUAIFENESIN 600 MG: 600 TABLET ORAL at 19:50

## 2022-03-30 RX ADMIN — BUTALBITAL, ACETAMINOPHEN, AND CAFFEINE 1 TABLET: 50; 325; 40 TABLET ORAL at 17:43

## 2022-03-30 RX ADMIN — TIOTROPIUM BROMIDE INHALATION SPRAY 2 PUFF: 3.12 SPRAY, METERED RESPIRATORY (INHALATION) at 06:12

## 2022-03-30 RX ADMIN — AZITHROMYCIN MONOHYDRATE 500 MG: 250 TABLET ORAL at 19:47

## 2022-03-30 RX ADMIN — METHIMAZOLE 10 MG: 5 TABLET ORAL at 08:13

## 2022-03-30 RX ADMIN — GUAIFENESIN 600 MG: 600 TABLET ORAL at 08:13

## 2022-03-30 RX ADMIN — BUDESONIDE AND FORMOTEROL FUMARATE DIHYDRATE 2 PUFF: 160; 4.5 AEROSOL RESPIRATORY (INHALATION) at 08:00

## 2022-03-30 RX ADMIN — GABAPENTIN 100 MG: 100 CAPSULE ORAL at 16:17

## 2022-03-30 RX ADMIN — POLYETHYLENE GLYCOL 3350 17 G: 17 POWDER, FOR SOLUTION ORAL at 08:13

## 2022-03-30 RX ADMIN — TIOTROPIUM BROMIDE INHALATION SPRAY 2 PUFF: 3.12 SPRAY, METERED RESPIRATORY (INHALATION) at 09:09

## 2022-03-30 RX ADMIN — GABAPENTIN 100 MG: 100 CAPSULE ORAL at 21:58

## 2022-03-30 RX ADMIN — TORSEMIDE 10 MG: 20 TABLET ORAL at 08:13

## 2022-03-30 RX ADMIN — BUDESONIDE AND FORMOTEROL FUMARATE DIHYDRATE 2 PUFF: 160; 4.5 AEROSOL RESPIRATORY (INHALATION) at 06:13

## 2022-03-30 RX ADMIN — CALCIUM CARBONATE-CHOLECALCIFEROL TAB 250 MG-125 UNIT 1 TABLET: 250-125 TAB at 08:13

## 2022-03-30 RX ADMIN — METOPROLOL SUCCINATE 100 MG: 50 TABLET, EXTENDED RELEASE ORAL at 08:12

## 2022-03-30 RX ADMIN — ACETAMINOPHEN 650 MG: 325 TABLET ORAL at 19:47

## 2022-03-30 RX ADMIN — ASPIRIN 81 MG: 81 TABLET, CHEWABLE ORAL at 08:12

## 2022-03-30 RX ADMIN — ESCITALOPRAM OXALATE 10 MG: 10 TABLET ORAL at 08:13

## 2022-03-30 RX ADMIN — LORAZEPAM 0.5 MG: 0.5 TABLET ORAL at 23:45

## 2022-03-30 RX ADMIN — BUTALBITAL, ACETAMINOPHEN, AND CAFFEINE 1 TABLET: 50; 325; 40 TABLET ORAL at 23:42

## 2022-03-30 RX ADMIN — EZETIMIBE 10 MG: 10 TABLET ORAL at 08:13

## 2022-03-30 RX ADMIN — PREDNISONE 15 MG: 10 TABLET ORAL at 08:13

## 2022-03-30 RX ADMIN — CLOPIDOGREL BISULFATE 75 MG: 75 TABLET ORAL at 08:13

## 2022-03-30 RX ADMIN — ATORVASTATIN CALCIUM 80 MG: 80 TABLET, FILM COATED ORAL at 19:50

## 2022-03-30 RX ADMIN — ACETAMINOPHEN 650 MG: 325 TABLET ORAL at 08:19

## 2022-03-30 NOTE — PROGRESS NOTES
PHYSICAL THERAPY DISCHARGE SUMMARY   TIME IN 1300  TIME OUT 1348   Precautions at discharge: Other (comment) (fall precautions, orthostatic hypotension)    Problem List:    Decreased strength B LE  [x]     Decreased strength trunk/core  [x]     Decreased AROM   []     Decreased PROM  []     Decreased balance sitting  []     Decreased balance standing  [x]     Decreased endurance  [x]     Pain  []       Functional Limitations:   Decreased independence with bed mobility  []     Decreased independence with functional transfers  [x]     Decreased independence with ambulation  [x]     Decreased independence with stair negotiation  [x]            Outcome Measures: Vital Signs:02 at 1 lpm. Resting 02 sat 98% and HR 77. 02 sat 93% and HR 81 after ambulating 80 ft  Patient Vitals for the past 12 hrs:   Temp Pulse Resp BP SpO2   03/30/22 1130  81 18 116/62 96 %   03/30/22 0742 98.2 °F (36.8 °C) 68 18 128/68 95 %   03/30/22 0613     94 %     Pain level:midl c/o headache pain  Pain location:headache  Pain interventions:medication    Patient education:Bed mobility training,transfer training, gait training, balance training,fall precautions, body mechanics,activity pacing,energy conservation,Patient verbalizing understanding and demonstrating  understanding of patient education. Recommend follow up education with [facility] PT    Interdisciplinary Communication:spoke with RN case manager regarding D/C plans      Cognition: Alert, able to follow commands,cooperating,participating, motivated, occasional cues for safe body mechanics during functional mobility. Occasional cues to recall orthostatic hypotension precautions.       MMT Initial Assessment   Right Lower Extremity Left Lower Extremity   Hip Flexion 4- 4+   Knee Extension 5 5   Knee Flexion 5 5   Ankle Dorsiflexion 5 5      MMT Discharge Assessment   Right Lower Extremity Left Lower Extremity   Hip Flexion 4- 4+   Knee Extension 5 5   Knee Flexion 5 5   Ankle Dorsiflexion 5 5 0/5 No palpable muscle contraction  1/5 Palpable muscle contraction, no joint movement  2-/5 Less than full range of motion in gravity eliminated position  2/5 Able to complete full range of motion in gravity eliminated position  2+/5 Able to initiate movement against gravity  3-/5 More than half but not full range of motion against gravity  3/5 Able to complete full range of motion against gravity  3+/5 Completes full range of motion against gravity with minimal resistance  4-/5 Completes full range of motion against gravity with minimal-moderate resistance  4/5 Completes full range of motion against gravity with moderate resistance  4+/5 Completes full range of motion against gravity with moderate-maximum resistance  5/5 Completes full range of motion against gravity with maximum resistance     AROM: Bilateral LE generally decreased, functional    PRIMARY MODE OF LOCOMOTION: ambulation  Please see IRC Interdisciplinary Eval: Coordination/Balance Section for details regarding FIM score description. BED/CHAIR/WHEELCHAIR TRANSFERS Initial Assessment Discharge Assessment   Rolling Right 5 (Supervision) 6 (Modified independent)   Rolling Left 5 (Supervision) 6 (Modified independent)   Supine to Sit 5 (Supervision) 6 (Modified independent)   Sit to Stand Contact guard assistance Supervision   Sit to Supine 5 (Supervision) 6 (Modified independent)   Transfer Type SPT with walker SPT with walker   Comments Increased time and effort to complete bed mobility and transfers. Requires time to sit on side of bed after transitioning to sitting Increased time and effort to complete bed mobility and transfers.  Occasional loss of balance during sit to stand due to orthostatic hypotension   Car Transfer Not tested Supervision   Car Type rehab car rehab car       [facility] MOBILITY/MANAGEMENT Initial Assessment Discharge Assessment   Able to [employer]       Functional Level       Curbs/ramps assistance required 0 (Not tested) 0 (Not tested)   Wheelchair set up assistance required 0 (Not tested) 0 (Not tested)   Wheelchair management           WALKING INDEPENDENCE Initial Assessment Discharge Assessment   Assistive device Walker, rolling,Gait belt Walker, rolling   Ambulation assistance - level surface 4 (Contact guard assistance) 5 (Supervision)   Distance 60 Feet (ft) 80 Feet (ft) making one 180 degree turn after ambulating 40 ft   Comments slow cont step through gait pattern with mild decrease in step length. SLower pace to control RR and 02 sat slow cont step through gait pattern controlling gait speed order to control RR and 02 sat. Patient able to manage 02 line    Ambulation assistance - unlevel surface 0 (Not tested) 5 (Stand-by assistance) (with RW)       STEPS/STAIRS Initial Assessment Discharge Assessment   Steps/Stairs ambulated 0 4   Rail Use  (with RW) Both   Functional Level       Comments Unable to ambulate up/down steps due to fatigue SLow single step at a time leading up with LLE and down with RLE. Increased time and effort to complete with 3 min sitting rest break after going up 4 steps   Curbs/Ramps 0 (Not tested) 5 (Stand-by assistance) (up/down 10 ft ramp with RW,up/down 6 inch step with RW)       QUALITY INDICATOR ASSIST COMMENTS   Roll right (&return to back) 6: Independent    Roll left (& return to back) 6:  Independent    Supine to sit 6: Independent    Sit to stand 4: Supervision or touching A    Chair/bed-to-chair transfer 4: Supervision or touching A    Walk 10 feet 4: Supervision or touching A    Walk 50 feet with 2 turns 4: Supervision or touching A    Walk 150 feet Not Tested: Not attempted due to decreased endurance    Walk 10 feet on uneven  4: Supervision or touching A    1 step/curb 4: Supervision or touching A    4 steps 4: Supervision or touching A    12 steps Not Tested: Not attempted due to decreased endurance     object 4: Supervision or touching A    Wheel 50' w/2 turns Not Tested: Not applicable secondary to pt not completing activity previously    Wheel 150' Not Tested: Not applicable secondary to pt not completing activity previously    Car Transfer 4: Supervision or touching A             PHYSICAL THERAPY PLAN OF CARE    LTGs: patient met 2 out of 5 goals per eval. Refer to care plan for details    Pt would benefit from continued skilled physical therapy in order to improve independent functional mobility within the home with use of least restrictive device. Interventions may include range of motion (AROM, PROM B LE/trunk), motor function (B LE/trunk strengthening/coordination), activity tolerance (vitals, oxygen saturation levels), bed mobility training, balance activities, gait training (progressive ambulation program), and functional transfer training. HEP handout:Patient to cont with supine LE HEP issued during previous admit    Pt to be discharged 03/31/22 with assistance provided by family. Family training completed during previous admit  Therapy Recommendations upon discharge: [address] needs at discharge: Patient owns recommended DME, RW and w/c      Please see IRC; Interdisciplinary Eval, Care Plan, and Patient Education for further information regarding physical therapy discharge summary and plan of care. Patient returned to room at end of treatment and remained up in recliner with LEs elevated and with needs in reach. 02 at 1 lpm    Reynaldo Green, PT  3/30/2022

## 2022-03-30 NOTE — PROGRESS NOTES
OT DISCHARGE REPORT    Time In 0832   Time Out 0933     Subjective: Pt reports she feels crummy this morning. Pt agreeable to treatment. Pain: Pt reported HA, reports she has had pain medicine. Interdisciplinary Communication: Collaborated with PT to ensure progress towards POC and goals. Precautions: Falls     Patient Vitals for the past 8 hrs:   Temp Pulse Resp BP SpO2   03/30/22 0742 98.2 °F (36.8 °C) 68 18 128/68 95 %   03/30/22 0613     94 %      Mobility   Usual Performance Score Comments   Rolling 6: Independent Side: Bilateral   Supine to Sit 6: Independent    Sit to Supine 6: Independent      Sit to Stand 4: Supervision or touching A S   Transfer Assist 4: Supervision or touching A Transfer Type: SPT   Equipment: Rolling Walker   Comments: S-SBA, pt requires cues at times for O2 NC management   Ambulation 4: Supervision or touching A Equipment: Rolling Walker   Comments: SBA, pt requires cues at times for O2 NC management and safety     Activities of Daily Living    Usual Performance Score Comments   Eating 6: Independent    Oral Hygiene 6: Independent    Bathing 6: Independent Type of Shower: Shower  Position: Standing PRN and Unsupported Sitting   Adaptive  Equipment: Tub Transfer Bench, Grab Bars and Long Handled Sponge   Upper Body  Dressing 6: Independent Items Applied: Pullover and Bra  Position: Supported Sitting   Lower Body Dressing 6: Independent Items Applied: Underwear and Elastic pants  Position: Supported sitting and Standing PRN  Adaptive Equipment: RW and W/C   Donning/[place] Footwear 6:  Independent Items Applied: Socks  Adaptive Equipment: N/A   Toilet Transfer 4: Supervision or touching A Transfer Type: SPT   Equipment: Rolling Walker and Grab Bars   Comments: SBA-S   Toileting Hygiene 6: Independent Output: NA   Education Activity pacing, Breathing technique, Fall precautions, Family training, Functional transfer training, O2 nasal cannula management training and Safety awareness Education and Family Training   Pt and significant other participated in family training. Discussed and demonstrated recommendations for ambulation with SBA/RW, cues at times for O2 NC management. Discussed how pt becomes dizzy and/or orthostatic at times and fatigues quickly requiring seated rest breaks often and sometimes suddenly. Recommended SBA for mobility with w/c or chair easily available for pt to sit suddenly. Recommended 24 hour supervision, but pt's significant other works outside of the home at times. Discussed recommendations for pt not to ambulate when alone but to use the w/c and/or pull BSC out of bathroom and place next to chair for easy access when home alone. Pt resistant despite education. Discussed pt's performance with ADLs and recommendation for SBA during mobility related ADLs with options for seated rest breaks throughout bathing/dressing. Pt and significant other provided with hand out regarding grab bars and how to access them. Pt and spouse verbalized understanding of information and recommendations with no further questions for OT at this time. Plan of Care: Please see below for progress towards goals during stay:  LTG 1: Patient will be modified independent with lower body dressing using AE/DME PRN within 7 days. 3/30/22 GOAL MET. LTG 2: Patient will be modified independent with bathing using AE/DME PRN within 7 days. 3/30/22 GOAL MET. LTG 3: Patient will demonstrate supervision/SBA with toilet transfer using AE/DME PRN within 7 days. 3/30/22 GOAL MET. LTG 4: Patient will be modified independent with toilet hygiene using AE/DME PRN within 7 days. 3/30/22 GOAL MET. LTG 5: Patient will demonstrate SBA/Supervision with shower transfer using AE/DME PRN within 7 days.  3/30/22 GOAL MET.     Precautions at Discharge: Falls, dyspnea on exertion, difficulty with O2 NC management   Discharge Location: Home with ex-   Safety/Supervision Recommendations/Functional Level: Pt completes all ADLs with additional time to rest, SBA to [place] using RW, shower chair, grab bars, BSC, and long handled sponge. Pt ambulates with SBA using RW, requires cues prn to manage O2 NC for ambulation and for mobility related ADLs. Pt will require MaxA to total assistance with IADLs such as home management, cooking and cleaning. Pt will require assistance with w/c for mobility and rest breaks during community outings. Family Training: completed today with pt's significant other. Discussed recommendations for HHOT and SBA during all ambulation. Pt and family with no further questions at this time. Recommended Continuing Therapy: HHOT  Residual Deficits: Decreased Standing balance, Standing tolerance, Activity tolerance, Adherence to precautions and Functional mobility. Progress over LOS: Patient demonstrates good progress with ADL skills, UE strength, Standing balance, Standing tolerance, Activity tolerance, Adherence to precautions and Functional mobility for performance of ADL and functional transfers, see above for details. Patient continues to require skilled [address] [place] [address] services to address remaining deficits stated above. Summary of Session:   Focus of session was on morning ADL routine and discharge assessment. Patient was able to ambulate ~15 feet x2 using a RW with SBA. Collaborated with PT and confirmed patient is ready for discharge. Patient ended session in seated in recliner with call remote and needs within reach.      Ambar Rice, OTR/L   3/30/2022

## 2022-03-30 NOTE — PROGRESS NOTES
Jaylyn Collado MD  Medical Director  [address] [facility], [address] [facility]  Tel: [phone] J[address] Elan PROGRESS NOTE    Susana Alison [facility]  Admit Date: 3/18/2022  Admit Diagnosis:   SDH (subdural hematoma) ([facility] [address] [S06.5X9A]    Subjective     Patient seen and examined. Feeling well. Sitting up in recliner with LEs elevated. Denies cp or sob. No n/pain.  Much more confident about going home this time.  + HA but less so  Objective:     Current Facility-Administered Medications   Medication Dose Route Frequency    cetirizine (ZYRTEC) tablet 10 mg  10 mg Oral DAILY    butalbital-acetaminophen-caffeine (FIORICET, ESGIC) -40 mg per tablet 1 Tablet  1 Tablet Oral Q6H PRN    multivitamin w ZN (STRESSTABS W ZINC) tablet  1 Tablet Oral DAILY    calcium-vitamin D (OS-DOMINGO +D3) 250 mg-125 unit per tablet 1 Tablet  1 Tablet Oral DAILY    torsemide (DEMADEX) tablet 10 mg  10 mg Oral DAILY    clopidogreL (PLAVIX) tablet 75 mg  75 mg Oral DAILY    azithromycin (ZITHROMAX) tablet 500 mg  500 mg Oral Q MON, WED & FRI    budesonide-formoteroL (SYMBICORT) 160-4.5 mcg/actuation HFA inhaler 2 Puff  2 Puff Inhalation BID RT    escitalopram oxalate (LEXAPRO) tablet 10 mg  10 mg Oral DAILY    ezetimibe (ZETIA) tablet 10 mg  10 mg Oral DAILY    gabapentin (NEURONTIN) capsule 100 mg  100 mg Oral TID    LORazepam (ATIVAN) tablet 0.5 mg  0.5 mg Oral Q8H PRN    methIMAzole (TAPAZOLE) tablet 10 mg  10 mg Oral DAILY    metoprolol succinate (TOPROL-XL) XL tablet 100 mg  100 mg Oral DAILY    nitroglycerin (NITROSTAT) tablet 0.4 mg  0.4 mg SubLINGual PRN    ondansetron (ZOFRAN ODT) tablet 4 mg  4 mg Oral Q6H PRN    oxyCODONE IR (ROXICODONE) tablet 5 mg  5 mg Oral Q4H PRN    pantoprazole (PROTONIX) tablet 40 mg  40 mg Oral ACB&D    polyethylene glycol (MIRALAX) packet 17 g  17 g Oral DAILY    predniSONE (DELTASONE) tablet 15 mg  15 mg Oral DAILY WITH BREAKFAST    tiotropium bromide (SPIRIVA RESPIMAT) 2.5 mcg /actuation  2 Puff Inhalation DAILY    traZODone (DESYREL) tablet 50 mg  50 mg Oral QHS PRN    acetaminophen (TYLENOL) tablet 650 mg  650 mg Oral Q4H PRN    atorvastatin (LIPITOR) tablet 80 mg  80 mg Oral QHS    bisacodyL (DULCOLAX) tablet 5 mg  5 mg Oral DAILY PRN    guaiFENesin ER (MUCINEX) tablet 600 mg  600 mg Oral Q12H    aspirin chewable tablet 81 mg  81 mg Oral DAILY       Review of Systems:   Denies chest pain, shortness of breath, cough, headache, visual problems, abdominal pain, dysuria, calf pain. Pertinent positives are as noted in the HPI, ROS unremarkable otherwise. Visit Vitals  /68 (BP 1 Location: Left upper arm, BP Patient Position: Sitting)   Pulse 68   Temp 98.2 °F (36.8 °C)   Resp 18   Wt 131 lb 6.4 oz (59.6 kg)   SpO2 95%   BMI 19.98 kg/m²        Physical Exam:   General: Alert and age appropriately oriented. No acute cardiorespiratory distress. HEENT: Normocephalic, no scleral icterus. Oral mucosa moist without cyanosis. Lungs: Clear to auscultation bilaterally. Respiration even and unlabored. Heart: Regular rate and rhythm, S1, S2. No murmurs, clicks, rub or gallops. Abdomen: Soft, non-tender, not distended. Bowel sounds normoactive. No organomegaly. Genitourinary: Deferred. Neuromuscular:      No gross focal motor deficits noted. Right hip flexion still hindered by pain from recent fx   Skin/extremity: No rashes, no erythema. No calf tenderness B LE.   Trace pedal edema more so on right with scant pitting                                                                              Functional Assessment:          Balance  Sitting - Static: Good (unsupported) (03/29/22 1600)  Sitting - Dynamic: Good (unsupported) (03/29/22 1600)  Standing - Static: Fair (03/29/22 1600)  Standing - Dynamic : Impaired (03/29/22 1600)                     Reva Locket Fall Risk Assessment:  [facility] Fall Risk  Mobility: Ambulates or transfers with assist devices or assistance (03/30/22 1070)  Mobility Interventions: Patient to call before getting OOB;Utilize walker, cane, or other assistive device (03/30/22 0723)  Mentation: Alert, oriented x 3 (03/30/22 0723)  Medication: Patient receiving anticonvulsants, sedatives(tranquilizers), psychotropics or hypnotics, hypoglycemics, narcotics, sleep aids, antihypertensives, laxatives, or diuretics (03/30/22 0723)  Medication Interventions: Patient to call before getting OOB (03/30/22 9593)  Elimination: Needs assistance with toileting (03/30/22 0723)  Elimination Interventions: Call light in reach; Patient to call for help with toileting needs; Toileting schedule/hourly rounds (03/30/22 0723)  Prior Fall History: Before admission in past 12 months _home or previous inpatient care) (03/30/22 0723)  History of Falls Interventions: Consult care management for discharge planning (03/30/22 0723)  Total Score: 4 (03/30/22 0723)  Standard Fall Precautions: Yes (03/23/22 2010)  High Fall Risk: Yes (03/30/22 0723)     Speech Assessment:         Ambulation:  Gait  Distance (ft): 80 Feet (ft) (40ft x 2 with one 180 degree turn) (03/29/22 1600)  Assistive Device: Walker, rolling (03/29/22 1600)  Rail Use:  (with RW) (03/28/22 1550)     Labs/Studies:  No results found for this or any previous visit (from the past 67 hour(s)).     Assessment:     Problem List as of 3/30/2022 Date Reviewed: 3/21/2022          Codes Class Noted - Resolved    Chest pain ICD-10-CM: R07.9  ICD-9-CM: 786.50  5/31/2019 - Present        * (Principal) SDH (subdural hematoma) ([facility] ICD-10-CM: D61.0M5E  ICD-9-CM: 432.1  3/18/2022 - Present        Post-traumatic subdural hematoma ([facility] ICD-10-CM: H17.6Q4M  ICD-9-CM: 852.20  3/15/2022 - Present        Hypokalemic alkalosis ICD-10-CM: E87.3  ICD-9-CM: 276.3  3/15/2022 - Present        EKG, abnormal ICD-10-CM: R94.31  ICD-9-CM: 794.31  2/15/2022 - Present        Femur fracture, right [facility]) ICD-10-CM: W36.25JH  ICD-9-CM: 821.00  2/11/2022 - Present        COPD with acute lower respiratory infection ([facility] [address] ICD-10-CM: J44.0  ICD-9-CM: 496, 519.8  2/10/2022 - Present        Moderate to severe mitral regurgitation ICD-10-CM: I34.0  ICD-9-CM: 424.0  2/8/2022 - Present        Iron deficiency anemia due to chronic blood loss (Chronic) ICD-10-CM: D50.0  ICD-9-CM: 280.0  2/8/2022 - Present        Closed right hip fracture ([facility] [address] ICD-10-CM: S72.001A  ICD-9-CM: 820.8  2/4/2022 - Present        Other fracture of right femur, initial encounter for closed fracture ([facility] [address] ICD-10-CM: U58.0H2M  ICD-9-CM: 821.00  2/4/2022 - Present        Multiple closed anterior-posterior compression fractures of pelvis ([facility] [address] ICD-10-CM: O50.22TP  ICD-9-CM: 808.44  2/2/2022 - Present        DDD (degenerative disc disease), lumbar ICD-10-CM: M51.36  ICD-9-CM: 722.52  2/2/2022 - Present        DNR (do not resuscitate) ICD-10-CM: Z66  ICD-9-CM: V49.86  11/9/2021 - Present    Overview Signed 11/9/2021 12:25 PM by Christopher Mittal NP     POST form completed - DNR but full treatment, including intubation. No TRACH. No PEG. Acute bilateral low back pain without sciatica ICD-10-CM: M54.50  ICD-9-CM: 724.2, 338.19  11/9/2021 - Present        Palliative care patient ICD-10-CM: Z51.5  ICD-9-CM: V66.7  7/14/2021 - Present        Depression ICD-10-CM: F32. A  ICD-9-CM: 136  5/6/2021 - Present        Anxiety ICD-10-CM: F41.9  ICD-9-CM: 300.00  5/6/2021 - Present        Ischemic heart disease, hx pci, cath/pci last 5/2019 ICD-10-CM: I25.9  ICD-9-CM: 414.9  2/24/2021 - Present        H/O carotid endarterectomy ICD-10-CM: Z98.890  ICD-9-CM: V45.89  9/21/2020 - Present        Right ear pain ICD-10-CM: H92.01  ICD-9-CM: 388.70  9/21/2020 - Present        Pulmonary mass ICD-10-CM: R91.8  ICD-9-CM: 786.6  2/11/2020 - Present        Centrilobular emphysema ([address] ICD-10-CM: J43.2  ICD-9-CM: 492.8  12/20/2019 - Present        Carotid stenosis ICD-10-CM: I65.29  ICD-9-CM: 433.10  12/3/2019 - Present        Carotid stenosis, right ICD-10-CM: I65.21  ICD-9-CM: 433.10  12/3/2019 - Present        Hypertensive urgency ICD-10-CM: I16.0  ICD-9-CM: 401.9  6/8/2019 - Present        Chronic respiratory failure with hypoxia (HCC) (Chronic) ICD-10-CM: J96.11  ICD-9-CM: 518.83, 799.02  6/8/2019 - Present        CAD S/P percutaneous coronary angioplasty ICD-10-CM: I25.10, Z98.61  ICD-9-CM: 414.01, V45.82  5/31/2019 - Present        Ventricular ectopy ICD-10-CM: I49.3  ICD-9-CM: 427.69  5/9/2017 - Present        Chronic anxiety ICD-10-CM: F41.9  ICD-9-CM: 300.00  4/27/2017 - Present        Hyperlipidemia (Chronic) ICD-10-CM: E78.5  ICD-9-CM: 272.4  10/10/2016 - Present        Essential hypertension, benign (Chronic) ICD-10-CM: I10  ICD-9-CM: 401.1  10/10/2016 - Present        Coronary artery disease involving native coronary artery of native heart without angina pectoris ICD-10-CM: I25.10  ICD-9-CM: 414.01  10/10/2016 - Present        Hypoxemia ICD-10-CM: R09.02  ICD-9-CM: 799.02  8/25/2013 - Present    Overview Signed 2/5/2014  9:08 AM by Margo Contreras, NP     FILIBERTO 8/2013. Pt had over 2 hours 1/2 hours of desaturations at night. O2 was ordered for her to start at 2L in September, but she stated she was feeling better than when FILIBERTO was done and refused O2. FILIBERTO 12/2013: not performed secondary to patient factors.               Acute respiratory failure with hypoxia [facility]) ICD-10-CM: J96.01  ICD-9-CM: 518.81  8/22/2013 - Present        COPD, very severe (HCC) (Chronic) ICD-10-CM: J44.9  ICD-9-CM: 496  8/20/2013 - Present        Personal history of tobacco use ICD-10-CM: N15.588  ICD-9-CM: V15.82  8/19/2013 - Present        History of MI (myocardial infarction) ICD-10-CM: I25.2  ICD-9-CM: 269  8/19/2013 - Present    Overview Addendum 8/30/2018  9:20 AM by Daniella Harrington MD     STEMI 8/19/13:  Angioplasty for in-stent stenosis to LAD             RESOLVED: Chronic obstructive pulmonary disease ([facility] ICD-10-CM: J44.9  ICD-9-CM: 496  10/10/2016 - 8/30/2018        RESOLVED: Acute systolic heart failure ([facility] ICD-10-CM: I50.21  ICD-9-CM: 428.21  8/22/2013 - 8/30/2018        RESOLVED: Pulmonary hemorrhage ICD-10-CM: R04.89  ICD-9-CM: 786.30  8/20/2013 - 6/25/2019        RESOLVED: CAD (coronary artery disease) (Chronic) ICD-10-CM: I25.10  ICD-9-CM: 414.00  8/19/2013 - 8/3/2021        RESOLVED: COPD (chronic obstructive pulmonary disease) (HCC) (Chronic) ICD-10-CM: J44.9  ICD-9-CM: 496  8/19/2013 - 8/20/2013              S/p traumatic subdural hematoma     Plan / Recommendations / Medical Decision Making:      Continue daily physician / PA medical management:     Subdural hematoma - PT/OT; good rehab potential.  -3/29 intractable headaches, right frontal. Will repeat Head CT although doubt new or worsening bleed at this point. May be vasospasm but suspect post concussive headaches; encouraged her to try the [employer]  -3/30 reports that the fiorcet was effective     S/p right IT fracture 2/2022 now s/p fall with fx of the lesser trochanter; WBAT. No surgery required, per Dr Mile Drew.     Hyperlipidemia - continue Lipitor and Zetia.     COPD - O2- and steroid-dependent, 1-2 L O2. Continue Symbicort, Spiriva and prednisone 15mg. Encourage IS. She is on prophylactic azithromycin 500mg MWF for inflammation due to COPD / emphysema. There was concern during hospitalization 2/2022 of pulmonary malignancy with a 2.8cm left perihilar lower lobe spiculated mass per CT chest 2/26/2022.  -3/26 sats low to upper 90s on 1-2 L of O2  -3/29 was able to participate in therapy on 1/2L this a.m.  - 3/30 1-2 L norm for her. Today we spoke about energy conservation     Hx of MI - s/p stent to LAD and distal RCA 2/7/2022; continue statin, was on Effient at home. Plans to restart ASA and begin Plavix; will wait 2wks post SDH; this was a very small bleed.  ASA 81mg restarted on [facility] admission; monitor clinically. Seen by Dr Galdino Castaneda 3/16.  -3/21 per Cardiology, Bridget Posey I would like to start back dual antiplatelet therapy (using aspirin and clopidogrel) in 3 to 4 days if possible\"; per Neurosurgery, okay to start Plavix  -3/24 ASA / Plavix causing challenges with skin, continue to monitor  -3/25 bout of CP this AM controlled with NTG, doing well  -3/29 no recent complaints of cp     Hypertension - BP fluctuating, managed medically. Continue Toprol XL, PRN SL NTG.  -3/22 BP fluctuatin-184/, no dizziness or orthostasis; increasing LE edema, resume torsemide 10mg PO daily; compression garments  -3/23 BP stable at 114-148/50-56, Toprol XL held this AM; monitor, continue torsemide and compression garments  -3/27 /51, asymptomatic  -3/29 /62 stable  -VSS     Anemia - Hgb 9.3 and stable.  -3/21 Hgb 8.3, drifting down; recheck in 2d  -3/25 Hgb 9.0 / Hct 29.5, progressing in a positive direction, B complex begun     Hypokalemia - resolved; monitor and replace if needed. -3/21 K+ 4.1 today  -3/25 electrolytes stable, BNP improving     Congestive heart failure, CHF - BNP markedly elevated at 4033.  -3/22 resume torsemide 10mg PO daily  -3/25 BNP 2917 and trending downward, continue torsemide     Osteoporosis - start calcium+Vitamin D every day. Consider the addition of medication as an outpatient in addition to resistance training and vibration plate therapy.     Pain management - fairly recent right hip fracture. Will require regular pain assessment and comprehensive pain management. PRN oxycodone and APAP; history of T12-L1 compression fractures.     Pneumonia prophylaxis - incentive spirometer every hour while awake.     DVT risk / DVT prophylaxis - daily physician / PA exam to assess as patient is at increased risk for of thromboembolism. Mobilize as tolerated. Sequential pneumatic compression devices (SCDs) when in bed; thigh-high or knee-high thromboembolic deterrent hose when out of bed.  No anticoagulation due to SDH.  -3/21 on ASA since 3/19; okay to start Plavix per NSG     GERD - continue Protonix BID. At times may need additional antacids, Maalox prn.     Depression - continue on Lexapro. At risk of depression exacerbation due to re-hospitalization and decline in functional mobility and independence. Resume PRN Ativan for anxiety.     General skin care / wound prevention - monitor general skin status daily per staff and physician / PA. At risk for failure due to impaired mobility. Patient with multiple bruises and friable skin.     Bladder program - voiding without difficulty.     Bowel program - at risk for constipation as a side effect of opioids, other medications, impaired mobility, etc. MiraLAX daily for regularity, Marisela-Colace for stool softener. PRN MOM, bisacodyl suppository or tablets for constipation.     Hyperthyroidism - continue Tapazole.       Time spent was 25 minutes with over 1/2 in direct patient care/examination, consultation and coordination of care.      Signed By: Karyna Mariano MD     March 30, 2022

## 2022-03-30 NOTE — PROGRESS NOTES
PHYSICAL THERAPY DAILY NOTE  Time In: 1115  Time Out: 7586  Patient Seen For: AM;Gait training;Patient education; Therapeutic exercise;Transfer training; Other (see progress notes)    Subjective: patient reporting her headache is better today. Reports less stabbing pain. Reports she does not think she needs 24 hour supervision at home. Reports she will use her w/c to get around in if she cannot walk on her own. Objective:Vital Signs:on 02 at 1 lpm, 02 sat 92% and HR 88 after ambulating 75 ft. Standing /62 HR 81  Patient Vitals for the past 12 hrs:   Temp Pulse Resp BP SpO2   03/30/22 0742 98.2 °F (36.8 °C) 68 18 128/68 95 %   03/30/22 0613     94 %     Pain level:c/o mild headache pain  Pain location:HA  Pain interventions:medication    Patient education:,transfer training, gait training, balance training,fall precautions, body mechanics,activity pacing,energy conservation,orthostatic hypotension symptoms. Breathing techniques during gait. Patient verbalizing understanding and demonstrating understanding of patient education. Recommend follow up education. Interdisciplinary Communication:spoke with RN regarding patient comments above.  RN administered medication for HA during PT treatment      Other (comment) (Fall precautions, WBAT RLE)  GROSS ASSESSMENT Daily Assessment     NA       COGNITION Daily Assessment    Alert, able to follow commands,cooperating,participating, motivated,         BED/MAT MOBILITY Daily Assessment    Rolling Right : 0 (Not tested)  Rolling Left : 0 (Not tested)  Supine to Sit : 0 (Not tested)  Sit to Supine : 0 (Not tested)       TRANSFERS Daily Assessment   Increased time and effort to complete with cues for body mechanics   Transfer Type: SPT with walker  Other: commode transfers with supervision using RW and grab bar, Independent with clothing management and hygiene  Transfer Assistance : 5 (Supervision/setup)  Sit to Stand Assistance: Supervision  Car Transfers: Not tested       GAIT Daily Assessment    Amount of Assistance: 5 (Supervision/setup)  Distance (ft): 75 Feet (ft) (75ft x 2 with 4 min sitting rest break)  Assistive Device: Walker, rolling   Gait training with one time cue for controlling gait speed with RR. One time cue for managing 02 line      STEPS or STAIRS Daily Assessment    Steps/Stairs Ambulated (#): 0  Level of Assist : 0 (Not tested)       BALANCE Daily Assessment   No loss of balance during  transfer training. Loss of balance x 1 during gait with SBA and cue to correct Sitting - Static: Good (unsupported)  Sitting - Dynamic: Good (unsupported)  Standing - Static: Fair  Standing - Dynamic : Impaired       WHEELCHAIR MOBILITY Daily Assessment    Curbs/Ramps Assist Required (FIM Score): 0 (Not tested)  Wheelchair Setup Assist Required : 0 (Not tested)       LOWER EXTREMITY EXERCISES Daily Assessment   Increased time and effort to complete with multiple and frequent rest breaks. Cues for correct form   Extremity: Both  Exercise Type #1: Seated lower extremity strengthening  Sets Performed: 2  Reps Performed: 10  Level of Assist: Minimal assistance     SEATED EXERCISES Sets Reps Comments   Ankle Pumps 2 10    Hip Flexion 2 10    Long Arc Quads 2 10    Hip Adduction/Ball Squeeze 2 10    Hip Abduction with red Theraband 2 10    Hamstring Curls with red Theraband 2 10    Hip ext with green  t-band                        2         10         Assessment: No signs of orthostatic hypotension. Gait distance limited due to SOB and fatigue. Cont to recommend 24 hour supervision at home due to increased risk for falling       Patient returned to room at end of treatment and remained up in recliner with LEs elevated and with needs in reach.  02 at 1 lpm    Plan of Care: Complete D/C assessment this PM    Darrell Sprain, PT  3/30/2022

## 2022-03-30 NOTE — PROGRESS NOTES
Problem: Falls - Risk of  Goal: *Absence of Falls  Description: Document Rafita Bingham Fall Risk and appropriate interventions in the flowsheet.   Outcome: Progressing Towards Goal  Note: Fall Risk Interventions:  Mobility Interventions: Patient to call before getting OOB,Utilize walker, cane, or other assistive device         Medication Interventions: Patient to call before getting OOB    Elimination Interventions: Call light in reach,Patient to call for help with toileting needs,Toileting schedule/hourly rounds    History of Falls Interventions: Consult care management for discharge planning         Problem: Patient Education: Go to Patient Education Activity  Goal: Patient/Family Education  Outcome: Progressing Towards Goal

## 2022-03-31 VITALS
RESPIRATION RATE: 18 BRPM | DIASTOLIC BLOOD PRESSURE: 53 MMHG | TEMPERATURE: 98.7 F | SYSTOLIC BLOOD PRESSURE: 118 MMHG | WEIGHT: 134 LBS | BODY MASS INDEX: 20.37 KG/M2 | HEART RATE: 63 BPM | OXYGEN SATURATION: 99 %

## 2022-03-31 PROCEDURE — 3331090001 HH PPS REVENUE CREDIT

## 2022-03-31 PROCEDURE — 77010033678 HC OXYGEN DAILY

## 2022-03-31 PROCEDURE — 94760 N-INVAS EAR/PLS OXIMETRY 1: CPT

## 2022-03-31 PROCEDURE — 94640 AIRWAY INHALATION TREATMENT: CPT

## 2022-03-31 PROCEDURE — 74011250637 HC RX REV CODE- 250/637: Performed by: PHYSICAL MEDICINE & REHABILITATION

## 2022-03-31 PROCEDURE — 74011250637 HC RX REV CODE- 250/637: Performed by: PHYSICIAN ASSISTANT

## 2022-03-31 PROCEDURE — 3331090002 HH PPS REVENUE DEBIT

## 2022-03-31 PROCEDURE — 99239 HOSP IP/OBS DSCHRG MGMT >30: CPT | Performed by: PHYSICAL MEDICINE & REHABILITATION

## 2022-03-31 PROCEDURE — 74011636637 HC RX REV CODE- 636/637: Performed by: PHYSICAL MEDICINE & REHABILITATION

## 2022-03-31 RX ORDER — AZITHROMYCIN 500 MG/1
500 TABLET, FILM COATED ORAL
Qty: 36 TABLET | Refills: 3 | Status: SHIPPED | OUTPATIENT
Start: 2022-03-31

## 2022-03-31 RX ORDER — LEVALBUTEROL INHALATION SOLUTION 1.25 MG/3ML
1.25 SOLUTION RESPIRATORY (INHALATION)
Qty: 5 NEBULE | Refills: 3 | Status: SHIPPED | OUTPATIENT
Start: 2022-03-31 | End: 2022-04-04 | Stop reason: SDUPTHER

## 2022-03-31 RX ORDER — CLOPIDOGREL BISULFATE 75 MG/1
75 TABLET ORAL DAILY
Qty: 90 TABLET | Refills: 3 | Status: SHIPPED | OUTPATIENT
Start: 2022-03-31

## 2022-03-31 RX ADMIN — ZINC 1 TABLET: TAB ORAL at 08:29

## 2022-03-31 RX ADMIN — GABAPENTIN 100 MG: 100 CAPSULE ORAL at 08:30

## 2022-03-31 RX ADMIN — ASPIRIN 81 MG: 81 TABLET, CHEWABLE ORAL at 08:29

## 2022-03-31 RX ADMIN — GUAIFENESIN 600 MG: 600 TABLET ORAL at 08:30

## 2022-03-31 RX ADMIN — PREDNISONE 15 MG: 10 TABLET ORAL at 08:29

## 2022-03-31 RX ADMIN — CETIRIZINE HYDROCHLORIDE 10 MG: 10 TABLET, FILM COATED ORAL at 08:30

## 2022-03-31 RX ADMIN — TIOTROPIUM BROMIDE INHALATION SPRAY 2 PUFF: 3.12 SPRAY, METERED RESPIRATORY (INHALATION) at 05:53

## 2022-03-31 RX ADMIN — CLOPIDOGREL BISULFATE 75 MG: 75 TABLET ORAL at 08:30

## 2022-03-31 RX ADMIN — ESCITALOPRAM OXALATE 10 MG: 10 TABLET ORAL at 08:30

## 2022-03-31 RX ADMIN — CALCIUM CARBONATE-CHOLECALCIFEROL TAB 250 MG-125 UNIT 1 TABLET: 250-125 TAB at 08:29

## 2022-03-31 RX ADMIN — BUTALBITAL, ACETAMINOPHEN, AND CAFFEINE 1 TABLET: 50; 325; 40 TABLET ORAL at 06:00

## 2022-03-31 RX ADMIN — TORSEMIDE 10 MG: 20 TABLET ORAL at 08:29

## 2022-03-31 RX ADMIN — EZETIMIBE 10 MG: 10 TABLET ORAL at 08:30

## 2022-03-31 RX ADMIN — PANTOPRAZOLE SODIUM 40 MG: 40 TABLET, DELAYED RELEASE ORAL at 05:54

## 2022-03-31 RX ADMIN — BUDESONIDE AND FORMOTEROL FUMARATE DIHYDRATE 2 PUFF: 160; 4.5 AEROSOL RESPIRATORY (INHALATION) at 05:53

## 2022-03-31 RX ADMIN — METHIMAZOLE 10 MG: 5 TABLET ORAL at 08:30

## 2022-03-31 RX ADMIN — METOPROLOL SUCCINATE 100 MG: 50 TABLET, EXTENDED RELEASE ORAL at 08:29

## 2022-03-31 NOTE — PROGRESS NOTES
Pt discharged home with family member, transport to car via wheelchair. Pt transferred independently to front passenger seat of car from wheelchair.

## 2022-03-31 NOTE — DISCHARGE INSTRUCTIONS
DISCHARGE SUMMARY from Nurse    PATIENT INSTRUCTIONS:    After general anesthesia or intravenous sedation, for 24 hours or while taking prescription Narcotics:  · Limit your activities  · Do not drive and operate hazardous machinery  · Do not make important personal or business decisions  · Do  not drink alcoholic beverages  · If you have not urinated within 8 hours after discharge, please contact your surgeon on call. Report the following to your surgeon:  · Excessive pain, swelling, redness or odor of or around the surgical area  · Temperature over 100.5  · Nausea and vomiting lasting longer than 4 hours or if unable to take medications  · Any signs of decreased circulation or nerve impairment to extremity: change in color, persistent  numbness, tingling, coldness or increase pain  · Any questions    What to do at Home:  Recommended activity: Activity as tolerated. Regular diet. If you experience any of the following symptoms increased pain/nausea vomiting lasting longer than 4 hours not relieved by medication, elevated temperature greater than 100.5, increased weakness/fatigue, please follow up with Primary Care Physician. *  Please give a list of your current medications to your Primary Care Provider. *  Please update this list whenever your medications are discontinued, doses are      changed, or new medications (including over-the-counter products) are added. *  Please carry medication information at all times in case of emergency situations. These are general instructions for a healthy lifestyle:    No smoking/ No tobacco products/ Avoid exposure to second hand smoke  Surgeon General's Warning:  Quitting smoking now greatly reduces serious risk to your health.     Obesity, smoking, and sedentary lifestyle greatly increases your risk for illness    A healthy diet, regular physical exercise & weight monitoring are important for maintaining a healthy lifestyle    You may be retaining fluid if you have a history of heart failure or if you experience any of the following symptoms:  Weight gain of 3 pounds or more overnight or 5 pounds in a week, increased swelling in our hands or feet or shortness of breath while lying flat in bed. Please call your doctor as soon as you notice any of these symptoms; do not wait until your next office visit. The discharge information has been reviewed with the patient. The patient verbalized understanding. Discharge medications reviewed with the patient and appropriate educational materials and side effects teaching were provided.   ___________________________________________________________________________________________________________________________________

## 2022-03-31 NOTE — PROGRESS NOTES
Discharge instructions and discharge med list given to pt, voiced understanding, copy of discharge signed by pt, placed in paper chart. Awaiting arrival of pt's family for discharge home.

## 2022-03-31 NOTE — PROGRESS NOTES
Team conference today with discharge still set for Thursday, 3/31. BSN, CM met with pt at bedside and updated on d/c date. HH needs include PT/OT and pt would like to resume with [facility]. Referral placed. Family training complete. No DME needs as pt already has all needed DME at home. Pt will update spouse.  CM to continue to follow and monitor for any further needs.

## 2022-03-31 NOTE — PROGRESS NOTES
Pt to discharge home today. HH needs include PT/OT and pt would like to [facility] with [facility] [place]. Referral replaced and liaison aware. No DME needs as pt already has all needed DME at home. Family training complete. Family to transport pt home. Daughter plans to come in town from [address] to assist patient as well. All needs met. CM available if any new needs arise. Care Management Interventions  PCP Verified by CM: Yes (Dr. Tamara Briceño)  Mode of Transport at Discharge:  Other (see comment) (family )  Transition of Care Consult (CM Consult): Discharge [address]: Yes  Discharge Durable Medical Equipment: No  Physical Therapy Consult: Yes  Occupational Therapy Consult: Yes  Speech Therapy Consult: No  Support Systems: Spouse/Significant Other,Child(raghavendra)  Confirm Follow Up Transport: Family  The Plan for Transition of Care is Related to the Following Treatment Goals : Return to baseline  The Patient and/or Patient Representative was Provided with a Choice of Provider and Agrees with the Discharge Plan?: Yes  Name of the Patient Representative Who was Provided with a Choice of Provider and Agrees with the Discharge Plan: pt  Freedom of Choice List was Provided with Basic Dialogue that Supports the Patient's Individualized Plan of Care/Goals, Treatment Preferences and Shares the Quality Data Associated with the Providers?: Yes   Resource Information Provided?: No  Discharge Location  Patient Expects to be Discharged to[de-identified] Home with home health [facility])

## 2022-03-31 NOTE — DISCHARGE SUMMARY
Caryle Dunk, MD  Medical Director  [address], [address] [facility]  Tel: [phone]       PHYSICAL MEDICINE & REHABILITATION DISCHARGE SUMMARY     Date: 3/31/2022  Admission Date: 3/18/2022  Discharge Date: 3/31/2022      Primary Care Provider: Michelle Summers MD    Admission Condition: good  Discharged Condition: good    Admitting Diagnosis:   SDH (subdural hematoma) ([facility] [address] [S06.5X9A]     Active Problems:    SDH (subdural hematoma) ([facility]) (3/18/2022)     -O2  Dependent severe COPD  -S/p recent right femur fx   -Chronic respiratory failure with hypoxia  -Centrilobular emphysema  -HTN  -Thyroid disease  -Subacute medial displacement of the right lesser trochanter s/p fall  -hx of falls  -NSTEMI s/p stent to the LAD  -Hypokalemia  -anemia     Acute Rehab Dx:  Gait impairment / gait dysfunction  Debility  Deconditioning  Mobility and ambulation deficits  Self care / ADL deficits    Hospital Course: The patient was admitted to [address] [place] [address] by Anh Newman MD on 3/18/2022 s/p small traumatic SDH    HPI: The patient is a r-hand dominant, previously modified indep 66yo female known to me from [facility] stay in Feb s/p hip fx. She has a PMH of severe O2 dependent COPD,CAD status post PCI, chronic respiratory failure with hypoxia, centrilobular emphysema, hypertension, dependent at 1 L at night, pleurisy, thyroid disease, anxiety, arthritis, GERD, fall status post right femur fracture February 2022 and right carotid stenosis status post atherectomy in 2019.  Patient had a fall and presents to the emergency department with a right scalp hematoma.  The fall was reported as mechanical hitting the right side of the head and right hip.  Patient states she fell while on oxygen and tripped over tubing.  CT of the head without contrast showed a small left temporal convexity with a subdural hematoma in the right without any evidence of brain compression or midline shift.  Right hip shows repaired subacute right hip fracture and a medial displacement of the lesser trochanter. She was seen by ortho and can remain WBAT with no surgical intervention required. Neurosurgery was consulted with no acute intervention necessary with recommendation of repeat head CTs without contrast and to hold all anticoagulant antiplatelets.  Patient denied any neuro changes at that time.  Repeat CT of the head this morning showed no change from previous CT.    Cardiology was consulted due to being on Plavix and ASA s/p recent NSTEM with complicated stenting to the LAD. They recommended cont ASA and only holding Plavix for less than 3-4d.   F/u Head CT; Unchanged thin right subdural hematoma, with no mass effect or  midline shift. Pt receiving K for hypokalemia with a K of 2. 6.   hgb 9.8 WBC 11.3, creat .9  3/18 K 4.1, creat 0.6, Na 138, hbg 9.3    REHABILITATION COURSE:     S/p traumatic subdural hematoma     Plan / Recommendations / Medical Decision Making:      Continue daily physician / PA medical management:     Subdural hematoma - PT/OT; good rehab potential.  -3/29 intractable headaches, right frontal. Will repeat Head CT although doubt new or worsening bleed at this point. May be vasospasm but suspect post concussive headaches; encouraged her to try the [employer]  -3/30 reports that the fiorcet was effective     S/p right IT fracture 2/2022 now s/p fall with fx of the lesser trochanter; WBAT. No surgery required, per Dr Mauro Gamez.     Hyperlipidemia - continue Lipitor and Zetia.     COPD - O2- and steroid-dependent, 1-2 L O2. Continue Symbicort, Spiriva and prednisone 15mg. Encourage IS. She is on prophylactic azithromycin 500mg MWF for inflammation due to COPD / emphysema.  There was concern during hospitalization 2/2022 of pulmonary malignancy with a 2.8cm left perihilar lower lobe spiculated mass per CT chest 2/26/2022.  -3/26 sats low to upper 90s on 1-2 L of O2  -3/29 was able to participate in therapy on 1/2L this a.m.  - 3/30 1-2 L norm for her. Today we spoke about energy conservation  -3/31 wants xopenex at [place], not albuterol     Hx of MI - s/p stent to LAD and distal RCA 2022; continue statin, was on Effient at home. Plans to restart ASA and begin Plavix; will wait 2wks post SDH; this was a very small bleed. ASA 81mg restarted on [facility] admission; monitor clinically. Seen by Dr Huber Fraction 3/16.  -3/21 per Cardiology, Janice Mohr I would like to start back dual antiplatelet therapy (using aspirin and clopidogrel) in 3 to 4 days if possible\"; per Neurosurgery, okay to start Plavix  -3/24 ASA / Plavix causing challenges with skin, continue to monitor  -3/25 bout of CP this AM controlled with NTG, doing well  -3/29 no recent complaints of cp  --3/31 cont ASA and Plavix at [place]     Hypertension - BP fluctuating, managed medically. Continue Toprol XL, PRN SL NTG.  -3/22 BP fluctuatin-184/, no dizziness or orthostasis; increasing LE edema, resume torsemide 10mg PO daily; compression garments  -3/23 BP stable at 114-148/50-56, Toprol XL held this AM; monitor, continue torsemide and compression garments  -3/27 /51, asymptomatic  -3/29 /62 stable  -VSS     Anemia - Hgb 9.3 and stable.  -3/21 Hgb 8.3, drifting down; recheck in 2d  -3/25 Hgb 9.0 / Hct 29.5, progressing in a positive direction, B complex begun     Hypokalemia - resolved; monitor and replace if needed. -3/21 K+ 4.1 today  -3/25 electrolytes stable, BNP improving     Congestive heart failure, CHF - BNP markedly elevated at 4033.  -3/22 resume torsemide 10mg PO daily  -3/25 BNP 2917 and trending downward, continue torsemide     Osteoporosis - start calcium+Vitamin D every day. Consider the addition of medication as an outpatient in addition to resistance training and vibration plate therapy.     Pain management - fairly recent right hip fracture.  Will require regular pain assessment and comprehensive pain management. PRN oxycodone and APAP; history of T12-L1 compression fractures.     Pneumonia prophylaxis - incentive spirometer every hour while awake.     DVT risk / DVT prophylaxis - daily physician / PA exam to assess as patient is at increased risk for of thromboembolism. Mobilize as tolerated. Sequential pneumatic compression devices (SCDs) when in bed; thigh-high or knee-high thromboembolic deterrent hose when out of bed. No anticoagulation due to SDH.  -3/21 on ASA since 3/19; okay to start Plavix per NSG     GERD - continue Protonix BID. At times may need additional antacids, Maalox prn.     Depression - continue on Lexapro. At risk of depression exacerbation due to re-hospitalization and decline in functional mobility and independence. Resume PRN Ativan for anxiety.     General skin care / wound prevention - monitor general skin status daily per staff and physician / PA. At risk for failure due to impaired mobility. Patient with multiple bruises and friable skin.     Bladder program - voiding without difficulty.     Bowel program - at risk for constipation as a side effect of opioids, other medications, impaired mobility, etc. MiraLAX daily for regularity, Marisela-Colace for stool softener. PRN MOM, bisacodyl suppository or tablets for constipation.     Hyperthyroidism - continue Tapazole.           FUNCTIONAL LEVEL ON ADMISSION:  PT: min assist with bed mobility/transfers/STS and CGA/min assist with gait 15ft RW  OT; moderate assist with bathing/dressing ;min assist with toileting and functional mobility, SBA f/g    FUNCTIONAL LEVEL AT DISCHARGE:    PHYSICAL THERAPY DISCHARGE SUMMARY   TIME IN 1300  TIME OUT 1348   Precautions at discharge: Other (comment) (fall precautions, orthostatic hypotension)     Problem List:    Decreased strength B LE  [x]? Decreased strength trunk/core  [x]? Decreased AROM   []? Decreased PROM  []? Decreased balance sitting  []?      Decreased balance standing  [x]? Decreased endurance  [x]? Pain  []?        Functional Limitations:   Decreased independence with bed mobility  []? Decreased independence with functional transfers  [x]? Decreased independence with ambulation  [x]? Decreased independence with stair negotiation  [x]?           Outcome Measures: Vital Signs:02 at 1 lpm. Resting 02 sat 98% and HR 77. 02 sat 93% and HR 81 after ambulating 80 ft  Patient Vitals for the past 12 hrs:    Temp Pulse Resp BP SpO2   03/30/22 1130  81 18 116/62 96 %   03/30/22 0742 98.2 °F (36.8 °C) 68 18 128/68 95 %   03/30/22 0613     94 %      Pain level:midl c/o headache pain  Pain location:headache  Pain interventions:medication     Patient education:Bed mobility training,transfer training, gait training, balance training,fall precautions, body mechanics,activity pacing,energy conservation,Patient verbalizing understanding and demonstrating  understanding of patient education. Recommend follow up education with [place] PT     Interdisciplinary Communication:spoke with RN case manager regarding D/C plans        Cognition: Alert, able to follow commands,cooperating,participating, motivated, occasional cues for safe body mechanics during functional mobility.  Occasional cues to recall orthostatic hypotension precautions.                  MMT Initial Assessment    Right Lower Extremity Left Lower Extremity   Hip Flexion 4- 4+   Knee Extension 5 5   Knee Flexion 5 5   Ankle Dorsiflexion 5 5                   MMT Discharge Assessment    Right Lower Extremity Left Lower Extremity   Hip Flexion 4- 4+   Knee Extension 5 5   Knee Flexion 5 5   Ankle Dorsiflexion 5 5   0/5            No palpable muscle contraction  1/5            Palpable muscle contraction, no joint movement  2-/5          Less than full range of motion in gravity eliminated position  2/5            Able to complete full range of motion in gravity eliminated position  2+/5          Able to initiate movement against gravity  3-/5          More than half but not full range of motion against gravity  3/5            Able to complete full range of motion against gravity  3+/5          Completes full range of motion against gravity with minimal resistance  4-/5          Completes full range of motion against gravity with minimal-moderate resistance  4/5            Completes full range of motion against gravity with moderate resistance  4+/5          Completes full range of motion against gravity with moderate-maximum resistance  5/5            Completes full range of motion against gravity with maximum resistance                 AROM: Bilateral LE generally decreased, functional     PRIMARY MODE OF LOCOMOTION: ambulation  Please see IRC Interdisciplinary Eval: Coordination/Balance Section for details regarding FIM score description.     BED/CHAIR/WHEELCHAIR TRANSFERS Initial Assessment Discharge Assessment   Rolling Right 5 (Supervision) 6 (Modified independent)   Rolling Left 5 (Supervision) 6 (Modified independent)   Supine to Sit 5 (Supervision) 6 (Modified independent)   Sit to Stand Contact guard assistance Supervision   Sit to Supine 5 (Supervision) 6 (Modified independent)   Transfer Type SPT with walker SPT with walker   Comments Increased time and effort to complete bed mobility and transfers. Requires time to sit on side of bed after transitioning to sitting Increased time and effort to complete bed mobility and transfers.  Occasional loss of balance during sit to stand due to orthostatic hypotension   Car Transfer Not tested Supervision   Car Type rehab car rehab car         WHEELCHAIR MOBILITY/MANAGEMENT Initial Assessment Discharge Assessment   Able to Propel     Functional Level     Curbs/ramps assistance required 0 (Not tested) 0 (Not tested)   Wheelchair set up assistance required 0 (Not tested) 0 (Not tested)   Wheelchair management           WALKING INDEPENDENCE Initial Assessment Discharge Assessment   Assistive device Walker, rolling,Gait belt Walker, rolling   Ambulation assistance - level surface 4 (Contact guard assistance) 5 (Supervision)   Distance 60 Feet (ft) 80 Feet (ft) making one 180 degree turn after ambulating 40 ft   Comments slow cont step through gait pattern with mild decrease in step length. SLower pace to control RR and 02 sat slow cont step through gait pattern controlling gait speed order to control RR and 02 sat. Patient able to manage 02 line    Ambulation assistance - unlevel surface 0 (Not tested) 5 (Stand-by assistance) (with RW)         STEPS/STAIRS Initial Assessment Discharge Assessment   Steps/Stairs ambulated 0 4   Rail Use  (with RW) Both   Functional Level     Comments Unable to ambulate up/down steps due to fatigue SLow single step at a time leading up with LLE and down with RLE. Increased time and effort to complete with 3 min sitting rest break after going up 4 steps   Curbs/Ramps 0 (Not tested) 5 (Stand-by assistance) (up/down 10 ft ramp with RW,up/down 6 inch step with RW)         QUALITY INDICATOR ASSIST COMMENTS   Roll right (&return to back) 6: Independent     Roll left (& return to back) 6:  Independent     Supine to sit 6: Independent     Sit to stand 4: Supervision or touching A     Chair/bed-to-chair transfer 4: Supervision or touching A     Walk 10 feet 4: Supervision or touching A     Walk 50 feet with 2 turns 4: Supervision or touching A     Walk 150 feet Not Tested: Not attempted due to decreased endurance     Walk 10 feet on uneven  4: Supervision or touching A     1 step/curb 4: Supervision or touching A     4 steps 4: Supervision or touching A     12 steps Not Tested: Not attempted due to decreased endurance      object 4: Supervision or touching A     Wheel 48' w/2 turns Not Tested: Not applicable secondary to pt not completing activity previously     Wheel 150' Not Tested: Not applicable secondary to pt not completing activity previously   Car Transfer 4: Supervision or touching A                PHYSICAL THERAPY PLAN OF CARE     LTGs: patient met 2 out of 5 goals per eval. Refer to care plan for details     Pt would benefit from continued skilled physical therapy in order to improve independent functional mobility within the home with use of least restrictive device. Interventions may include range of motion (AROM, PROM B LE/trunk), motor function (B LE/trunk strengthening/coordination), activity tolerance (vitals, oxygen saturation levels), bed mobility training, balance activities, gait training (progressive ambulation program), and functional transfer training.      HEP handout:Patient to cont with supine LE HEP issued during previous admit     Pt to be discharged 03/31/22 with assistance provided by family. Family training completed during previous admit  Therapy Recommendations upon discharge: [address] needs at discharge: Patient owns recommended DME, RW and w/c       Please see IRC; Interdisciplinary Eval, Care Plan, and Patient Education for further information regarding physical therapy discharge summary and plan of care.      Patient returned to room at end of treatment and remained up in recliner with LEs elevated and with needs in reach. 02 at 1 lpm     Yessi Amaya, PT  3/30/2022           OT DISCHARGE REPORT     Time In 0832   Time Out 0933      Subjective: Pt reports she feels crummy this morning. Pt agreeable to treatment. Pain: Pt reported HA, reports she has had pain medicine. Interdisciplinary Communication: Collaborated with PT to ensure progress towards POC and goals. Precautions: Falls      Patient Vitals for the past 8 hrs:    Temp Pulse Resp BP SpO2   03/30/22 0742 98.2 °F (36.8 °C) 68 18 128/68 95 %   03/30/22 0613     94 %      Mobility    Usual Performance Score Comments   Rolling 6:  Independent Side: Bilateral   Supine to Sit 6: Independent     Sit to Supine 6: Independent        Sit to Stand 4: Supervision or touching A S   Transfer Assist 4: Supervision or touching A Transfer Type: SPT   Equipment: Rolling Walker   Comments: S-SBA, pt requires cues at times for O2 NC management   Ambulation 4: Supervision or touching A Equipment: Rolling Walker   Comments: SBA, pt requires cues at times for O2 NC management and safety      Activities of Daily Living     Usual Performance Score Comments   Eating 6: Independent     Oral Hygiene 6: Independent     Bathing 6: Independent Type of Shower: Shower  Position: Standing PRN and Unsupported Sitting   Adaptive  Equipment: Tub Transfer Bench, Grab Bars and Long Handled Sponge   Upper Body  Dressing 6: Independent Items Applied: Pullover and Bra  Position: Supported Sitting   Lower Body Dressing 6: Independent Items Applied: Underwear and Elastic pants  Position: Supported sitting and Standing PRN  Adaptive Equipment: RW and W/C   Donning/[place] Footwear 6: Independent Items Applied: Socks  Adaptive Equipment: N/A   Toilet Transfer 4: Supervision or touching A Transfer Type: SPT   Equipment: Rolling Walker and Grab Bars   Comments: SBA-S   Toileting Hygiene 6: Independent Output: NA   Education Activity pacing, Breathing technique, Fall precautions, Family training, Functional transfer training, O2 nasal cannula management training and Safety awareness      Education and Family Training   Pt and significant other participated in family training. Discussed and demonstrated recommendations for ambulation with SBA/RW, cues at times for O2 NC management. Discussed how pt becomes dizzy and/or orthostatic at times and fatigues quickly requiring seated rest breaks often and sometimes suddenly. Recommended SBA for mobility with w/c or chair easily available for pt to sit suddenly. Recommended 24 hour supervision, but pt's significant other works outside of the home at times.  Discussed recommendations for pt not to ambulate when alone but to use the w/c and/or pull BSC out of bathroom and place next to chair for easy access when home alone. Pt resistant despite education. Discussed pt's performance with ADLs and recommendation for SBA during mobility related ADLs with options for seated rest breaks throughout bathing/dressing. Pt and significant other provided with hand out regarding grab bars and how to access them. Pt and spouse verbalized understanding of information and recommendations with no further questions for OT at this time.       Plan of Care: Please see below for progress towards goals during stay:  LTG 1: Patient will be modified independent with lower body dressing using AE/DME PRN within 7 days. 3/30/22 GOAL MET. LTG 2: Patient will be modified independent with bathing using AE/DME PRN within 7 days. 3/30/22 GOAL MET. LTG 3: Patient will demonstrate supervision/SBA with toilet transfer using AE/DME PRN within 7 days. 3/30/22 GOAL MET. LTG 4: Patient will be modified independent with toilet hygiene using AE/DME PRN within 7 days. 3/30/22 GOAL MET. LTG 5: Patient will demonstrate SBA/Supervision with shower transfer using AE/DME PRN within 7 days. 3/30/22 GOAL MET.     Precautions at Discharge: Falls, dyspnea on exertion, difficulty with O2 NC management   Discharge Location: Home with ex-   Safety/Supervision Recommendations/Functional Level: Pt completes all ADLs with additional time to rest, SBA to [place] using RW, shower chair, grab bars, BSC, and long handled sponge. Pt ambulates with SBA using RW, requires cues prn to manage O2 NC for ambulation and for mobility related ADLs. Pt will require MaxA to total assistance with IADLs such as home management, cooking and cleaning. Pt will require assistance with w/c for mobility and rest breaks during community outings. Family Training: completed today with pt's significant other. Discussed recommendations for HHOT and SBA during all ambulation.  Pt and family with no further questions at this time.      Recommended Continuing Therapy: HHOT  Residual Deficits: Decreased Standing balance, Standing tolerance, Activity tolerance, Adherence to precautions and Functional mobility. Progress over LOS: Patient demonstrates good progress with ADL skills, UE strength, Standing balance, Standing tolerance, Activity tolerance, Adherence to precautions and Functional mobility for performance of ADL and functional transfers, see above for details. Patient continues to require skilled [address] [place] [address] services to address remaining deficits stated above.     Summary of Session:   Focus of session was on morning ADL routine and discharge assessment. Patient was able to ambulate ~15 feet x2 using a RW with SBA.      Collaborated with PT and confirmed patient is ready for discharge. Patient ended session in seated in recliner with call remote and needs within reach.      ALIA Keller/L   3/30/2022       HOME ARCHITECTURE:  Private residence  # Steps to Enter: 1  Rails to [employer]: No  One/Two Story Residence: One story  Living Alone: No  Support Systems: Spouse/Significant Other  Patient Expects to be Discharged to[de-identified] Home with family assistance  Current DME Used/Available at Home: Walker,Wheelchair  Tub or Shower Type: Shower (door. No bars)     Previous Level of Function / Work / Activity:    Patient reporting she was ambulating short distances with RW inside home. Reports ambulation was limited due to COPD.  Reports significant other was assisting her with ADLs with compression stockings and set up assist with supervision for bathing and dressing       Past Medical History:   Diagnosis Date    Arrhythmia     Arthritis     CAD (coronary artery disease) 2009, 8/19/2013    PCI,  Fernando Bad     Carotid stenosis, right     endarterectomy 11/25/19    Chronic anxiety 4/27/2017    COPD     recent referral to [address] for lung transplant    Depression 5/6/2021    [facility] [address]     GERD (gastroesophageal reflux disease) controlled with medication    History of echocardiogram 2019    History of echocardiogram     echo 19 LVEF 55-60%    Hypertension     Nausea & vomiting     Oxygen dependent     1 liter at night    Pleurisy     Thyroid disease     pt denies      Past Surgical History:   Procedure Laterality Date    HX GYN      rebuilt cervix    HX HEART CATHETERIZATION  2019    last stent- per patient- 10 stents total    HX OTHER SURGICAL      HX TONSIL AND ADENOIDECTOMY      VASCULAR SURGERY PROCEDURE UNLIST Right 2019    carotid endarterectomy      Family History   Problem Relation Age of Onset    No Known Problems Mother         adopted      Social History     Tobacco Use    Smoking status: Former Smoker     Packs/day: 0.75     Years: 53.00     Pack years: 39.75     Types: Cigarettes     Quit date:      Years since quittin.2    Smokeless tobacco: Never Used   Substance Use Topics    Alcohol use: Yes     Comment: rarely     Allergies   Allergen Reactions    Promethazine Nausea and Vomiting and Other (comments)     Severe vomiting       Prior to Admission medications    Medication Sig Start Date End Date Taking? Authorizing Provider   predniSONE (DELTASONE) 5 mg tablet Take 3 Tablets by mouth daily (with breakfast). 3/31/22  Yes Anh Alves MD   tiotropium bromide (SPIRIVA RESPIMAT) 2.5 mcg/actuation inhaler Take 2 Puffs by inhalation daily. 3/31/22  Yes Anh Alves MD   traZODone (DESYREL) 50 mg tablet Take 1 Tablet by mouth nightly as needed for Sleep. 3/30/22  Yes Anh Alves MD   butalbital-acetaminophen-caffeine (FIORICET, ESGIC) -40 mg per tablet Take 1 Tablet by mouth every six (6) hours as needed for Headache. 3/30/22  Yes Anh Alves MD   calcium-vitamin D (OS-DOMINGO +D3) 250 mg-125 unit per tablet Take 1 Tablet by mouth daily.  3/31/22  Yes Anh Alves MD   clopidogreL (PLAVIX) 75 mg tab Take 1 Tablet by mouth daily. 3/31/22  Yes nAh Franklin MD   multivits,Stress Formula-Zinc tablet Take 1 Tablet by mouth daily. 3/31/22  Anh Francisco MD   pantoprazole (PROTONIX) 40 mg tablet Take 1 Tablet by mouth two (2) times a day. 3/30/22  Anh Francisco MD   metoprolol succinate (TOPROL-XL) 100 mg tablet Take 1 Tablet by mouth daily. 3/30/22  Anh Francisco MD   methIMAzole (TAPAZOLE) 10 mg tablet Take 1 Tablet by mouth daily. PCP will continue to prescribe this medication. Indications: overactive thyroid gland 3/30/22  Anh Francisco MD   LORazepam (ATIVAN) 0.5 mg tablet Take 1 Tablet by mouth every eight (8) hours as needed for Anxiety or Agitation. Max Daily Amount: 1.5 mg. PCP will prescribe this medication if it is necessary to continue. 3/30/22  Anh Francisco MD   gabapentin (NEURONTIN) 100 mg capsule Take 1 Capsule by mouth three (3) times daily for 30 days. 3/30/22 4/29/22 Yes Anh Franklin MD   escitalopram oxalate (LEXAPRO) 10 mg tablet Take 1 Tablet by mouth daily. PCP will continue to prescribe this medication. Indications: anxiousness associated with depression 3/30/22  Anh Francisco MD   cetirizine (ZyrTEC) 10 mg tablet Take 1 Tablet by mouth daily as needed for Allergies. 3/30/22  Anh Francisco MD   aspirin 81 mg chewable tablet Take 1 Tablet by mouth daily. 3/31/22  Anh Francisco MD   torsemide (DEMADEX) 10 mg tablet Take 1 Tablet by mouth daily. 3/30/22  Anh Francisco MD   roflumilast (Daliresp) 500 mcg tab tablet Take 1 Tablet by mouth daily. 3/30/22  Anh Francisco MD   albuterol (PROVENTIL VENTOLIN) 2.5 mg /3 mL (0.083 %) nebu 3 mL by Nebulization route every six (6) hours as needed for Shortness of Breath.  ICD-10-J44.9, please bill to Med B 3/30/22  Yes Becky Franklin MD   predniSONE (DELTASONE) 5 mg tablet Take 15 mg by mouth daily. Provider, Historical   acetaminophen (TYLENOL) 325 mg tablet Take 2 Tablets by mouth every eight (8) hours. Indications: for hip surgery pain  Patient not taking: Reported on 3/16/2022 2/24/22   REKHA Gray   polyethylene glycol (Miralax) 17 gram/dose powder Take 17 g by mouth daily. Indications: constipation  Patient not taking: Reported on 2/26/2022 2/24/22   REKHA Gray   senna-docusate (PERICOLACE) 8.6-50 mg per tablet Take 2 Tablets by mouth daily. Indications: constipation  Patient not taking: Reported on 2/26/2022 2/25/22   REKHA Gray   traZODone (DESYREL) 50 mg tablet Take 0.5 Tablets by mouth nightly as needed for Sleep. PCP will prescribe this medication if it is necessary to continue. Indications: insomnia associated with depression  Patient not taking: Reported on 3/16/2022 2/24/22   REKHA Gray   nitroglycerin (NITROSTAT) 0.4 mg SL tablet 1 Tablet by SubLINGual route three (3) times daily as needed for Chest Pain. Up to 3 doses. Patient taking differently: 1 Tablet by SubLINGual route three (3) times daily as needed for Chest Pain. Up to 3 doses for chest pain. then call 911 2/4/22   REKHA Velasco   ezetimibe (ZETIA) 10 mg tablet Take 1 Tablet by mouth daily. 10/7/21   Francis Garcia MD   budesonide-formoteroL (Symbicort) 160-4.5 mcg/actuation HFAA Take 2 Puffs by inhalation two (2) times a day. 10/7/21   Rick Gil NP   atorvastatin (LIPITOR) 80 mg tablet TAKE 1 TABLET BY MOUTH DAILY 9/27/21   Francis Garcia MD   tiotropium (Spiriva with HandiHaler) 18 mcg inhalation capsule Take 1 Cap by inhalation daily. 2/24/21   Rick Gil NP   OTHER Handicap placard 1/25/21   Rick Gil NP   OXYGEN-AIR DELIVERY SYSTEMS 1 L/min by Nasal route continuous. Provider, Historical   multivitamin (ONE A DAY) tablet Take 1 Tablet by mouth daily.     Provider, Historical       Lab Review: No results found for this or any previous visit (from the past 67 hour(s)). Functional Assessment:          Balance  Sitting - Static: Good (unsupported) (03/30/22 1500)  Sitting - Dynamic: Good (unsupported) (03/30/22 1500)  Standing - Static: Fair (03/30/22 1500)  Standing - Dynamic : Impaired (03/30/22 1200)                     Justo Hilton Fall Risk Assessment:  Justo Hilton Fall Risk  Mobility: Ambulates or transfers with assist devices or assistance (03/30/22 1955)  Mobility Interventions: Patient to call before getting OOB; Strengthening exercises (ROM-active/passive); Utilize walker, cane, or other assistive device (03/30/22 1955)  Mentation: Alert, oriented x 3 (03/30/22 1955)  Medication: Patient receiving anticonvulsants, sedatives(tranquilizers), psychotropics or hypnotics, hypoglycemics, narcotics, sleep aids, antihypertensives, laxatives, or diuretics (03/30/22 1955)  Medication Interventions: Patient to call before getting OOB; Teach patient to arise slowly (03/30/22 1955)  Elimination: Needs assistance with toileting (03/30/22 1955)  Elimination Interventions: Call light in reach; Patient to call for help with toileting needs;Stay With Me (per policy); Toilet paper/wipes in reach; Toileting schedule/hourly rounds (03/30/22 1955)  Prior Fall History: Before admission in past 12 months _home or previous inpatient care) (03/30/22 1955)  History of Falls Interventions: Consult care management for discharge planning;Room close to nurse's station;Door open when patient unattended (03/30/22 1955)  Total Score: 4 (03/30/22 1955)  Standard Fall Precautions: Yes (03/23/22 2010)  High Fall Risk: Yes (03/30/22 1955)     Speech Assessment:         Ambulation:  Gait  Distance (ft): 80 Feet (ft) (03/30/22 1500)  Assistive Device: Walker, rolling (03/30/22 1500)  Rail Use: Both (03/30/22 1500)     Visit Vitals  BP (!) 130/58   Pulse 72   Temp 98.2 °F (36.8 °C)   Resp 18   Wt 134 lb (60.8 kg)   SpO2 94%   BMI 20.37 kg/m²      Intake and Output:    03/29 0701 - 03/30 1900  In: 5 [P.O.:720]  Out: -     Discharge Exam:  General: Alert and age appropriately oriented. No acute cardiorespiratory distress. HEENT: Normocephalic, no scleral icterus. Oral mucosa moist without cyanosis. Lungs: Clear to auscultation bilaterally. Respiration even and unlabored. Heart: Regular rate and rhythm, S1, S2. No murmurs, clicks, rub or gallops. Abdomen: Soft, non-tender, not distended. Bowel sounds normoactive. No organomegaly. Genitourinary: Deferred. Neuromuscular:        No gross focal motor deficits noted. Right hip flexion still hindered by pain from recent fx   Skin/extremity: No rashes, no erythema. No calf tenderness B LE.   Trace pedal edema more so on right with scant pitting         Problem List as of 3/31/2022 Date Reviewed: 3/21/2022          Codes Class Noted - Resolved    Chest pain ICD-10-CM: R07.9  ICD-9-CM: 786.50  5/31/2019 - Present        * (Principal) SDH (subdural hematoma) ([facility] ICD-10-CM: D23.8F7V  ICD-9-CM: 432.1  3/18/2022 - Present        Post-traumatic subdural hematoma ([facility] ICD-10-CM: E75.5G8K  ICD-9-CM: 852.20  3/15/2022 - Present        Hypokalemic alkalosis ICD-10-CM: E87.3  ICD-9-CM: 276.3  3/15/2022 - Present        EKG, abnormal ICD-10-CM: R94.31  ICD-9-CM: 794.31  2/15/2022 - Present        Femur fracture, right ([facility] ICD-10-CM: V38.41HW  ICD-9-CM: 821.00  2/11/2022 - Present        COPD with acute lower respiratory infection ([facility] ICD-10-CM: J44.0  ICD-9-CM: 496, 519.8  2/10/2022 - Present        Moderate to severe mitral regurgitation ICD-10-CM: I34.0  ICD-9-CM: 424.0  2/8/2022 - Present        Iron deficiency anemia due to chronic blood loss (Chronic) ICD-10-CM: D50.0  ICD-9-CM: 280.0  2/8/2022 - Present        Closed right hip fracture ([facility] ICD-10-CM: S72.001A  ICD-9-CM: 820.8  2/4/2022 - Present        Other fracture of right femur, initial encounter for closed fracture ([facility] ICD-10-CM: Z31.3E7E  ICD-9-CM: 821.00  2/4/2022 - Present        Multiple closed anterior-posterior compression fractures of pelvis ([facility] ICD-10-CM: Q74.19QW  ICD-9-CM: 808.44  2/2/2022 - Present        DDD (degenerative disc disease), lumbar ICD-10-CM: M51.36  ICD-9-CM: 722.52  2/2/2022 - Present        DNR (do not resuscitate) ICD-10-CM: Z66  ICD-9-CM: V49.86  11/9/2021 - Present    Overview Signed 11/9/2021 12:25 PM by April AMANDA Villalobos     POST form completed - DNR but full treatment, including intubation. No TRACH. No PEG. Acute bilateral low back pain without sciatica ICD-10-CM: M54.50  ICD-9-CM: 724.2, 338.19  11/9/2021 - Present        Palliative care patient ICD-10-CM: Z51.5  ICD-9-CM: V66.7  7/14/2021 - Present        Depression ICD-10-CM: F32. A  ICD-9-CM: 606  5/6/2021 - Present        Anxiety ICD-10-CM: F41.9  ICD-9-CM: 300.00  5/6/2021 - Present        Ischemic heart disease, hx pci, cath/pci last 5/2019 ICD-10-CM: I25.9  ICD-9-CM: 414.9  2/24/2021 - Present        H/O carotid endarterectomy ICD-10-CM: Z98.890  ICD-9-CM: V45.89  9/21/2020 - Present        Right ear pain ICD-10-CM: H92.01  ICD-9-CM: 388.70  9/21/2020 - Present        Pulmonary mass ICD-10-CM: R91.8  ICD-9-CM: 786.6  2/11/2020 - Present        Centrilobular emphysema ([facility] ICD-10-CM: J43.2  ICD-9-CM: 492.8  12/20/2019 - Present        Carotid stenosis ICD-10-CM: I65.29  ICD-9-CM: 433.10  12/3/2019 - Present        Carotid stenosis, right ICD-10-CM: I65.21  ICD-9-CM: 433.10  12/3/2019 - Present        Hypertensive urgency ICD-10-CM: I16.0  ICD-9-CM: 401.9  6/8/2019 - Present        Chronic respiratory failure with hypoxia (HCC) (Chronic) ICD-10-CM: J96.11  ICD-9-CM: 518.83, 799.02  6/8/2019 - Present        CAD S/P percutaneous coronary angioplasty ICD-10-CM: I25.10, Z98.61  ICD-9-CM: 414.01, V45.82  5/31/2019 - Present        Ventricular ectopy ICD-10-CM: I49.3  ICD-9-CM: 427.69  5/9/2017 - Present        Chronic anxiety ICD-10-CM: F41.9  ICD-9-CM: 300.00  4/27/2017 - Present        Hyperlipidemia (Chronic) ICD-10-CM: E78.5  ICD-9-CM: 272.4  10/10/2016 - Present        Essential hypertension, benign (Chronic) ICD-10-CM: I10  ICD-9-CM: 401.1  10/10/2016 - Present        Coronary artery disease involving native coronary artery of native heart without angina pectoris ICD-10-CM: I25.10  ICD-9-CM: 414.01  10/10/2016 - Present        Hypoxemia ICD-10-CM: R09.02  ICD-9-CM: 799.02  8/25/2013 - Present    Overview Signed 2/5/2014  9:08 AM by Erlin Murillo NP     FILIBERTO 8/2013. Pt had over 2 hours 1/2 hours of desaturations at night. O2 was ordered for her to start at 2L in September, but she stated she was feeling better than when FILIBERTO was done and refused O2. FILIBERTO 12/2013: not performed secondary to patient factors. Acute respiratory failure with hypoxia (HCC) ICD-10-CM: J96.01  ICD-9-CM: 518.81  8/22/2013 - Present        COPD, very severe (HCC) (Chronic) ICD-10-CM: J44.9  ICD-9-CM: 496  8/20/2013 - Present        Personal history of tobacco use ICD-10-CM: I33.194  ICD-9-CM: V15.82  8/19/2013 - Present        History of MI (myocardial infarction) ICD-10-CM: I25.2  ICD-9-CM: 727  8/19/2013 - Present    Overview Addendum 8/30/2018  9:20 AM by Dimas Piña MD     STEMI 8/19/13:  Angioplasty for in-stent stenosis to LAD             RESOLVED: Chronic obstructive pulmonary disease ([facility] ICD-10-CM: J44.9  ICD-9-CM: 496  10/10/2016 - 8/30/2018        RESOLVED: Acute systolic heart failure ([facility] ICD-10-CM: I50.21  ICD-9-CM: 428.21  8/22/2013 - 8/30/2018        RESOLVED: Pulmonary hemorrhage ICD-10-CM: R04.89  ICD-9-CM: 786.30  8/20/2013 - 6/25/2019        RESOLVED: CAD (coronary artery disease) (Chronic) ICD-10-CM: I25.10  ICD-9-CM: 414.00  8/19/2013 - 8/3/2021        RESOLVED: COPD (chronic obstructive pulmonary disease) ([facility] (Chronic) ICD-10-CM: J44.9  ICD-9-CM: 496  8/19/2013 - 8/20/2013              DISCHARGE INSTRUCTIONS:   1.  Diet: regular  2. Activity: as tolerated; no driving until cleared by MD.       Current Discharge Medication List      START taking these medications    Details   tiotropium bromide (SPIRIVA RESPIMAT) 2.5 mcg/actuation inhaler Take 2 Puffs by inhalation daily. Qty: 4 g, Refills: 2      butalbital-acetaminophen-caffeine (FIORICET, ESGIC) -40 mg per tablet Take 1 Tablet by mouth every six (6) hours as needed for Headache. Qty: 60 Tablet, Refills: 1      calcium-vitamin D (OS-DOMINGO +D3) 250 mg-125 unit per tablet Take 1 Tablet by mouth daily. Qty: 90 Tablet, Refills: 3      clopidogreL (PLAVIX) 75 mg tab Take 1 Tablet by mouth daily. Qty: 90 Tablet, Refills: 3      multivits,Stress Formula-Zinc tablet Take 1 Tablet by mouth daily. Qty: 90 Tablet, Refills: 3      aspirin 81 mg chewable tablet Take 1 Tablet by mouth daily. Qty: 90 Tablet, Refills: 2      torsemide (DEMADEX) 10 mg tablet Take 1 Tablet by mouth daily. Qty: 90 Tablet, Refills: 3         CONTINUE these medications which have CHANGED    Details   predniSONE (DELTASONE) 5 mg tablet Take 3 Tablets by mouth daily (with breakfast). Qty: 90 Tablet, Refills: 1      traZODone (DESYREL) 50 mg tablet Take 1 Tablet by mouth nightly as needed for Sleep. Qty: 30 Tablet, Refills: 3      pantoprazole (PROTONIX) 40 mg tablet Take 1 Tablet by mouth two (2) times a day. Qty: 180 Tablet, Refills: 0      metoprolol succinate (TOPROL-XL) 100 mg tablet Take 1 Tablet by mouth daily. Qty: 90 Tablet, Refills: 0    Associated Diagnoses: Ischemic heart disease      methIMAzole (TAPAZOLE) 10 mg tablet Take 1 Tablet by mouth daily. PCP will continue to prescribe this medication. Indications: overactive thyroid gland  Qty: 30 Tablet, Refills: 0      LORazepam (ATIVAN) 0.5 mg tablet Take 1 Tablet by mouth every eight (8) hours as needed for Anxiety or Agitation. Max Daily Amount: 1.5 mg. PCP will prescribe this medication if it is necessary to continue.   Qty: 20 Tablet, Refills: 0    Associated Diagnoses: Chronic anxiety      gabapentin (NEURONTIN) 100 mg capsule Take 1 Capsule by mouth three (3) times daily for 30 days. Qty: 90 Capsule, Refills: 0      escitalopram oxalate (LEXAPRO) 10 mg tablet Take 1 Tablet by mouth daily. PCP will continue to prescribe this medication. Indications: anxiousness associated with depression  Qty: 30 Tablet, Refills: 1      cetirizine (ZyrTEC) 10 mg tablet Take 1 Tablet by mouth daily as needed for Allergies. Qty: 90 Tablet, Refills: 1      roflumilast (Daliresp) 500 mcg tab tablet Take 1 Tablet by mouth daily. Qty: 90 Tablet, Refills: 3      Xopenex 1.25 mg/3ml neb q6hrs prn wheezing, brochospasm          CONTINUE these medications which have NOT CHANGED    Details   nitroglycerin (NITROSTAT) 0.4 mg SL tablet 1 Tablet by SubLINGual route three (3) times daily as needed for Chest Pain. Up to 3 doses. Qty: 60 Each, Refills: 11      ezetimibe (ZETIA) 10 mg tablet Take 1 Tablet by mouth daily. Qty: 90 Tablet, Refills: 3    Associated Diagnoses: Ischemic heart disease      budesonide-formoteroL (Symbicort) 160-4.5 mcg/actuation HFAA Take 2 Puffs by inhalation two (2) times a day. Qty: 3 Each, Refills: 3      atorvastatin (LIPITOR) 80 mg tablet TAKE 1 TABLET BY MOUTH DAILY  Qty: 90 Tablet, Refills: 3    Associated Diagnoses: Ischemic heart disease      OTHER Handicap placard  Qty: 1 Each, Refills: 0      OXYGEN-AIR DELIVERY SYSTEMS 1 L/min by Nasal route continuous. multivitamin (ONE A DAY) tablet Take 1 Tablet by mouth daily.          STOP taking these medications       azithromycin (ZITHROMAX) 500 mg tab Comments:   Reason for Stopping:         magnesium oxide (MAG-OX) 400 mg tablet Comments:   Reason for Stopping:         acetaminophen (TYLENOL) 500 mg tablet Comments:   Reason for Stopping:         nystatin (MYCOSTATIN) 100,000 unit/mL suspension Comments:   Reason for Stopping:         acetaminophen (TYLENOL) 325 mg tablet Comments:   Reason for Stopping:         polyethylene glycol (Miralax) 17 gram/dose powder Comments:   Reason for Stopping:         senna-docusate (PERICOLACE) 8.6-50 mg per tablet Comments:   Reason for Stopping:         guaiFENesin ER (MUCINEX) 1,200 mg Ta12 ER tablet Comments:   Reason for Stopping:         lidocaine 4 % patch Comments:   Reason for Stopping:         tiotropium (Spiriva with HandiHaler) 18 mcg inhalation capsule Comments:   Reason for Stopping:         aspirin delayed-release 81 mg tablet Comments:   Reason for Stopping:               I have reviewed the patients controlled substance prescription history as maintained in the [place] prescription monitoring program () so that prescription(s) for controlled substance, if any provided, could be given. REHABILITATION MANAGEMENT:   1. Devices: has all needed eqpt from prior [facility] stay  2. Consult: PT/OT/CM  DISPOSITION:home with [facility] PT    Follow-up Information     Follow up With Specialties Details Why Contact Info    Le Winn MD Family Medicine In 1 week  [address] [facility]  [phone]               Time spent in patient discharge planning and coordination: >35 minutes.

## 2022-04-01 ENCOUNTER — HOME CARE VISIT (OUTPATIENT)
Dept: SCHEDULING | Facility: HOME HEALTH | Age: 72
End: 2022-04-01
Payer: MEDICARE

## 2022-04-01 VITALS
RESPIRATION RATE: 18 BRPM | SYSTOLIC BLOOD PRESSURE: 140 MMHG | DIASTOLIC BLOOD PRESSURE: 70 MMHG | HEART RATE: 78 BPM | TEMPERATURE: 97.8 F | OXYGEN SATURATION: 95 %

## 2022-04-01 PROCEDURE — G0151 HHCP-SERV OF PT,EA 15 MIN: HCPCS

## 2022-04-01 PROCEDURE — 3331090001 HH PPS REVENUE CREDIT

## 2022-04-01 PROCEDURE — 3331090002 HH PPS REVENUE DEBIT

## 2022-04-01 PROCEDURE — 400013 HH SOC

## 2022-04-02 PROCEDURE — 3331090001 HH PPS REVENUE CREDIT

## 2022-04-02 PROCEDURE — 3331090002 HH PPS REVENUE DEBIT

## 2022-04-03 PROCEDURE — 3331090002 HH PPS REVENUE DEBIT

## 2022-04-03 PROCEDURE — 3331090001 HH PPS REVENUE CREDIT

## 2022-04-04 ENCOUNTER — HOME CARE VISIT (OUTPATIENT)
Dept: SCHEDULING | Facility: HOME HEALTH | Age: 72
End: 2022-04-04
Payer: MEDICARE

## 2022-04-04 VITALS
OXYGEN SATURATION: 97 % | TEMPERATURE: 97 F | SYSTOLIC BLOOD PRESSURE: 110 MMHG | DIASTOLIC BLOOD PRESSURE: 60 MMHG | HEART RATE: 63 BPM | RESPIRATION RATE: 18 BRPM

## 2022-04-04 VITALS
HEART RATE: 19 BPM | TEMPERATURE: 97.2 F | DIASTOLIC BLOOD PRESSURE: 68 MMHG | SYSTOLIC BLOOD PRESSURE: 126 MMHG | RESPIRATION RATE: 18 BRPM

## 2022-04-04 PROCEDURE — G0152 HHCP-SERV OF OT,EA 15 MIN: HCPCS

## 2022-04-04 PROCEDURE — 3331090001 HH PPS REVENUE CREDIT

## 2022-04-04 PROCEDURE — 3331090002 HH PPS REVENUE DEBIT

## 2022-04-04 PROCEDURE — G0157 HHC PT ASSISTANT EA 15: HCPCS

## 2022-04-05 PROCEDURE — 3331090002 HH PPS REVENUE DEBIT

## 2022-04-05 PROCEDURE — 3331090001 HH PPS REVENUE CREDIT

## 2022-04-06 PROCEDURE — 3331090001 HH PPS REVENUE CREDIT

## 2022-04-06 PROCEDURE — 3331090002 HH PPS REVENUE DEBIT

## 2022-04-07 ENCOUNTER — HOME CARE VISIT (OUTPATIENT)
Dept: SCHEDULING | Facility: HOME HEALTH | Age: 72
End: 2022-04-07
Payer: MEDICARE

## 2022-04-07 VITALS
TEMPERATURE: 97.8 F | SYSTOLIC BLOOD PRESSURE: 132 MMHG | HEART RATE: 88 BPM | RESPIRATION RATE: 24 BRPM | OXYGEN SATURATION: 91 % | DIASTOLIC BLOOD PRESSURE: 76 MMHG

## 2022-04-07 PROCEDURE — G0151 HHCP-SERV OF PT,EA 15 MIN: HCPCS

## 2022-04-07 PROCEDURE — 3331090001 HH PPS REVENUE CREDIT

## 2022-04-07 PROCEDURE — 3331090002 HH PPS REVENUE DEBIT

## 2022-04-08 PROCEDURE — 3331090001 HH PPS REVENUE CREDIT

## 2022-04-08 PROCEDURE — 3331090002 HH PPS REVENUE DEBIT

## 2022-04-09 PROCEDURE — 3331090001 HH PPS REVENUE CREDIT

## 2022-04-09 PROCEDURE — 3331090002 HH PPS REVENUE DEBIT

## 2022-04-10 PROCEDURE — 3331090002 HH PPS REVENUE DEBIT

## 2022-04-10 PROCEDURE — 3331090001 HH PPS REVENUE CREDIT

## 2022-04-11 ENCOUNTER — HOME CARE VISIT (OUTPATIENT)
Dept: SCHEDULING | Facility: HOME HEALTH | Age: 72
End: 2022-04-11
Payer: MEDICARE

## 2022-04-11 VITALS
OXYGEN SATURATION: 97 % | DIASTOLIC BLOOD PRESSURE: 64 MMHG | SYSTOLIC BLOOD PRESSURE: 124 MMHG | HEART RATE: 68 BPM | RESPIRATION RATE: 18 BRPM | TEMPERATURE: 97.3 F

## 2022-04-11 PROCEDURE — 3331090002 HH PPS REVENUE DEBIT

## 2022-04-11 PROCEDURE — G0157 HHC PT ASSISTANT EA 15: HCPCS

## 2022-04-11 PROCEDURE — 3331090001 HH PPS REVENUE CREDIT

## 2022-04-12 ENCOUNTER — HOME CARE VISIT (OUTPATIENT)
Dept: SCHEDULING | Facility: HOME HEALTH | Age: 72
End: 2022-04-12
Payer: MEDICARE

## 2022-04-12 VITALS
DIASTOLIC BLOOD PRESSURE: 58 MMHG | HEART RATE: 53 BPM | SYSTOLIC BLOOD PRESSURE: 110 MMHG | OXYGEN SATURATION: 97 % | RESPIRATION RATE: 16 BRPM | TEMPERATURE: 97.6 F

## 2022-04-12 PROCEDURE — 3331090002 HH PPS REVENUE DEBIT

## 2022-04-12 PROCEDURE — 3331090001 HH PPS REVENUE CREDIT

## 2022-04-12 PROCEDURE — G0158 HHC OT ASSISTANT EA 15: HCPCS

## 2022-04-13 ENCOUNTER — HOME CARE VISIT (OUTPATIENT)
Dept: SCHEDULING | Facility: HOME HEALTH | Age: 72
End: 2022-04-13
Payer: MEDICARE

## 2022-04-13 VITALS
RESPIRATION RATE: 19 BRPM | SYSTOLIC BLOOD PRESSURE: 124 MMHG | TEMPERATURE: 97.4 F | HEART RATE: 68 BPM | DIASTOLIC BLOOD PRESSURE: 68 MMHG | OXYGEN SATURATION: 97 %

## 2022-04-13 PROCEDURE — G0157 HHC PT ASSISTANT EA 15: HCPCS

## 2022-04-13 PROCEDURE — 3331090002 HH PPS REVENUE DEBIT

## 2022-04-13 PROCEDURE — 3331090001 HH PPS REVENUE CREDIT

## 2022-04-14 ENCOUNTER — HOME CARE VISIT (OUTPATIENT)
Dept: SCHEDULING | Facility: HOME HEALTH | Age: 72
End: 2022-04-14
Payer: MEDICARE

## 2022-04-14 VITALS
OXYGEN SATURATION: 94 % | TEMPERATURE: 97.9 F | HEART RATE: 59 BPM | DIASTOLIC BLOOD PRESSURE: 60 MMHG | SYSTOLIC BLOOD PRESSURE: 125 MMHG | RESPIRATION RATE: 15 BRPM

## 2022-04-14 PROCEDURE — G0158 HHC OT ASSISTANT EA 15: HCPCS

## 2022-04-14 PROCEDURE — 3331090002 HH PPS REVENUE DEBIT

## 2022-04-14 PROCEDURE — 3331090001 HH PPS REVENUE CREDIT

## 2022-04-15 PROCEDURE — 3331090001 HH PPS REVENUE CREDIT

## 2022-04-15 PROCEDURE — 3331090002 HH PPS REVENUE DEBIT

## 2022-04-16 PROCEDURE — 3331090001 HH PPS REVENUE CREDIT

## 2022-04-16 PROCEDURE — 3331090002 HH PPS REVENUE DEBIT

## 2022-04-17 PROCEDURE — 3331090002 HH PPS REVENUE DEBIT

## 2022-04-17 PROCEDURE — 3331090001 HH PPS REVENUE CREDIT

## 2022-04-18 ENCOUNTER — HOME CARE VISIT (OUTPATIENT)
Dept: SCHEDULING | Facility: HOME HEALTH | Age: 72
End: 2022-04-18
Payer: MEDICARE

## 2022-04-18 VITALS
SYSTOLIC BLOOD PRESSURE: 126 MMHG | RESPIRATION RATE: 18 BRPM | DIASTOLIC BLOOD PRESSURE: 70 MMHG | HEART RATE: 68 BPM | TEMPERATURE: 97.4 F

## 2022-04-18 PROCEDURE — 3331090001 HH PPS REVENUE CREDIT

## 2022-04-18 PROCEDURE — G0157 HHC PT ASSISTANT EA 15: HCPCS

## 2022-04-18 PROCEDURE — 3331090002 HH PPS REVENUE DEBIT

## 2022-04-19 PROCEDURE — 3331090002 HH PPS REVENUE DEBIT

## 2022-04-19 PROCEDURE — 3331090001 HH PPS REVENUE CREDIT

## 2022-04-20 ENCOUNTER — HOME CARE VISIT (OUTPATIENT)
Dept: SCHEDULING | Facility: HOME HEALTH | Age: 72
End: 2022-04-20
Payer: MEDICARE

## 2022-04-20 VITALS
DIASTOLIC BLOOD PRESSURE: 58 MMHG | SYSTOLIC BLOOD PRESSURE: 128 MMHG | OXYGEN SATURATION: 96 % | RESPIRATION RATE: 15 BRPM | TEMPERATURE: 98.2 F | HEART RATE: 64 BPM

## 2022-04-20 PROCEDURE — 3331090002 HH PPS REVENUE DEBIT

## 2022-04-20 PROCEDURE — 3331090001 HH PPS REVENUE CREDIT

## 2022-04-20 PROCEDURE — G0158 HHC OT ASSISTANT EA 15: HCPCS

## 2022-04-21 ENCOUNTER — HOME CARE VISIT (OUTPATIENT)
Dept: SCHEDULING | Facility: HOME HEALTH | Age: 72
End: 2022-04-21
Payer: MEDICARE

## 2022-04-21 VITALS
OXYGEN SATURATION: 98 % | DIASTOLIC BLOOD PRESSURE: 72 MMHG | HEART RATE: 76 BPM | SYSTOLIC BLOOD PRESSURE: 118 MMHG | TEMPERATURE: 98.2 F | RESPIRATION RATE: 18 BRPM

## 2022-04-21 PROCEDURE — 3331090001 HH PPS REVENUE CREDIT

## 2022-04-21 PROCEDURE — 3331090002 HH PPS REVENUE DEBIT

## 2022-04-21 PROCEDURE — G0151 HHCP-SERV OF PT,EA 15 MIN: HCPCS

## 2022-04-22 PROCEDURE — 3331090002 HH PPS REVENUE DEBIT

## 2022-04-22 PROCEDURE — 3331090001 HH PPS REVENUE CREDIT

## 2022-04-23 PROCEDURE — 3331090002 HH PPS REVENUE DEBIT

## 2022-04-23 PROCEDURE — 3331090001 HH PPS REVENUE CREDIT

## 2022-04-24 PROCEDURE — 3331090002 HH PPS REVENUE DEBIT

## 2022-04-24 PROCEDURE — 3331090001 HH PPS REVENUE CREDIT

## 2022-04-25 ENCOUNTER — HOME CARE VISIT (OUTPATIENT)
Dept: SCHEDULING | Facility: HOME HEALTH | Age: 72
End: 2022-04-25
Payer: MEDICARE

## 2022-04-25 VITALS
HEART RATE: 62 BPM | SYSTOLIC BLOOD PRESSURE: 110 MMHG | DIASTOLIC BLOOD PRESSURE: 80 MMHG | TEMPERATURE: 97.9 F | OXYGEN SATURATION: 96 % | RESPIRATION RATE: 18 BRPM

## 2022-04-25 PROCEDURE — 3331090001 HH PPS REVENUE CREDIT

## 2022-04-25 PROCEDURE — 3331090002 HH PPS REVENUE DEBIT

## 2022-04-25 PROCEDURE — G0152 HHCP-SERV OF OT,EA 15 MIN: HCPCS

## 2022-04-29 ENCOUNTER — HOSPITAL ENCOUNTER (OUTPATIENT)
Dept: PHYSICAL THERAPY | Age: 72
Discharge: HOME | End: 2022-04-29
Attending: ORTHOPAEDIC SURGERY
Payer: MEDICARE

## 2022-04-29 PROCEDURE — 97161 PT EVAL LOW COMPLEX 20 MIN: CPT

## 2022-04-29 PROCEDURE — 97110 THERAPEUTIC EXERCISES: CPT

## 2022-04-29 NOTE — PROGRESS NOTES
Sarthak Soto  : 1950  [place]  Secondary: [facility] [address], [place] [address]  [phone]   [phone]      Visit Count:  1    OUTPATIENT PHYSICAL THERAPY: Daily Treatment Note 2022  ICD-10: Treatment Diagnosis: Pain in right hip (M25.551), Muscle weakness (generalized) (M62.81)  Precautions/Allergies:   Promethazine   TREATMENT PLAN:  Effective Dates: 2022 TO 2022 (60 days). Frequency/Duration: 2 times a week for 60 Day(s) MEDICAL/REFERRING DIAGNOSIS:  Pain in right hip [M25.551]   DATE OF ONSET: 22  REFERRING PHYSICIAN: Florentino Bennett MD MD Orders: Melissa Brito and treat  Return MD Appointment: NA       Pre-treatment Subjective Report:  Patient presents with generalized weakness and R hip pain. Pain: Initial:   Pain Intensity 1: 2 /10 Post Session:   Pain:  2/10  Other:    Medications Last Reviewed:  2022  Updated Objective Findings:  See evaluation note from today   TREATMENT:   THERAPEUTIC EXERCISE: (15 minutes):     Date:  22 Date:   Date:     Activity/Exercise Parameters Parameters Parameters   Mini squats X 10      Bridges X 10      Hip abduction X 10                                  MANUAL THERAPY: ( minutes):     Manual Therapy technique and location Date:   Date:   Date:      Parameters Parameters Parameters           MODALITIES ( minutes):    [email or web address] Portal    Access Code: [email or web address]  URL: [email or web address]  Date: 2022  Prepared by: Angeles Mcclendon    Exercises  Clamshell with Resistance - 1 x daily - 7 x weekly - 3 sets - 10 reps  Supine Bridge - 1 x daily - 7 x weekly - 3 sets - 10 reps  Mini Squat with Counter Support - 1 x daily - 7 x weekly - 3 sets - 10 reps      Treatment/Session Summary:    · Response to Treatment: Patient verbalized and demonstrated understanding of HEP.     · Communication/Consultation: None today  · Equipment provided today:  None today  · Recommendations/Intent for next treatment session: Next visit will focus on progressing overall plan of care.  .  Total Treatment Billable Duration:  45 total minutes (30 minutes initial evaluation, 15 minutes therex)  PT Patient Time In/Time Out  Time In: 0915  Time Out: 1000    Future Appointments   Date Time Provider Jorgito White   5/3/2022  8:45 AM Keyonna Clutter, PT [facility]   5/5/2022  8:45 AM Sandra Benjamin Brochure, PT [facility]   5/9/2022  8:45 AM Keyonna Clutter, PT [facility]   5/12/2022  8:00 AM Keyonna Clutter, PT [facility]   5/16/2022  8:45 AM Keyonna Clutter, PT [facility]   5/20/2022  8:45 AM Keyonna Clutter, PT [facility]   10/18/2022  8:00 AM MD [facility]   10/24/2022  8:00 AM [facility]TRAS[facility] 1 [facility]   10/24/2022  8:30 AM Eric Latham NP [facility] M[facility] Pope PT

## 2022-04-29 NOTE — THERAPY EVALUATION
Dia Medina  : 1950  [place] / [place] / Product Type: Medicare /    [address]  at [facility]  [address] , [address], [place] [address]  [phone]   [phone]         OUTPATIENT PHYSICAL THERAPY:Initial Assessment 2022    ICD-10: Treatment Diagnosis: Pain in right hip (M25.551), Muscle weakness (generalized) (M62.81)  Precautions/Allergies:   Promethazine ]  TREATMENT PLAN:  Effective Dates: 2022 TO 7/3/2022 (60 days). Frequency/Duration: 2 times a week for 60 Day(s) MEDICAL/REFERRING DIAGNOSIS:  Pain in right hip [M25.551]   DATE OF ONSET: 22  REFERRING PHYSICIAN: Raf Acevedo MD MD Orders: Salena Goff and treat  Return MD Appointment: GIDEON     INITIAL ASSESSMENT:  Ms. Rosi Canela presents to PT approx. 10 weeks following a cephallomedullary nail fixation for a proximal femur fracture. Pt demonstrates decreased R hip AROM, strength, balance and antalgic gait. She is currently ambulating with a RW with decreased stance time, stride length, and foot clearance on RLE. Pt reports minimal pain during weightbearing 2/10 and scored a 28/80 on the LEFS. Pt also presented with supplemental O2 on 1 L and reports dyspnea upon exertion, O2 levels remained 90-96% throughout testing. Pt will benefit from skilled PT to address the deficits listed below. PROBLEM LIST (Impacting functional limitations):  1. Decreased Strength  2. Decreased ADL/Functional Activities  3. Decreased Transfer Abilities  4. Decreased Ambulation Ability/Technique  5. Decreased Balance  6. Increased Pain  7. Decreased Activity Tolerance  8. Increased Shortness of Breath  9. Decreased Flexibility/Joint Mobility INTERVENTIONS PLANNED:  1. Gait Training  2. Home Exercise Program (HEP)  3. Manual Therapy  4. Neuromuscular Re-education/Strengthening  5. Range of Motion (ROM)  6. Therapeutic Activites  7.  Therapeutic Exercise/Strengthening GOALS: (Goals have been discussed and agreed upon with patient.)  Short-Term Functional Goals: Time Frame: 2-4 weeks   1. Pt will be compliant and independent with HEP. 2. Pt will improve score on the LEFS by at least 10 points to increase pt's overall functional mobility. 3. Pt will improve R hip flexion AROM to 0-120deg to increase pt's ease with transfers and gait. 4. Pt will be able to increase overall weightbearing through RLE with minimal pain (>3/10) to improve overall gait mechanics. 5. Pt will be able to ambulate with a near normal gait pattern for community distances with no AD. Discharge Goals: Time Frame: 6-8 weeks  1.   Pt will improve score on the LEFS to at least a 60/80 to increase pt's overall functional mobility. 2.   Pt will improve overall R hip strength to >4/5 to increase independence with activities of daily living. 3.   Pt will be able to ascend 6-8\" steps reciprocally and descend 6\" steps reciprocally with use of a rail with good form and control. 4.   Pt will be able to perform a complete sit to stand without any assistance from UEs in order to improve functional mobility. 5.   Pt will be able to wean off of supplemental O2 in order to increase independence with activities of daily living. Outcome Measure: Tool Used: Lower Extremity Functional Scale (LEFS)  Score:  Initial: 28/80 Most Recent: X/80 (Date: -- )   Interpretation of Score: 20 questions each scored on a 5 point scale with 0 representing \"extreme difficulty or unable to perform\" and 4 representing \"no difficulty\". The lower the score, the greater the functional disability. 80/80 represents no disability. Minimal detectable change is 9 points. Medical Necessity:   · Patient is expected to demonstrate progress in strength, range of motion, balance and functional technique to increase independence with activities of daily living.  .  Reason for Services/Other Comments:  · Patient continues to demonstrate capacity to improve range of motion, strength, gait mechanics, and activity tolerance which will increase independence. Total Duration: 45 minutes (30 minutes evaluation, 15 minutes therex)  PT Patient Time In/Time Out  Time In: 0915  Time Out: 1000    Rehabilitation Potential For Stated Goals: Good  Regarding Han Porter's therapy, I certify that the treatment plan above will be carried out by a therapist or under their direction. Thank you for this referral,  Mahsa Carvalho, PT     Referring Physician Signature: Adolph Gonzalez MD              Date                    The information in this section was collected on 4/29/2022   (except where otherwise noted). HISTORY:   History of Present Injury/Illness (Reason for Referral):  Patient is about 10 weeks out from cephalomedullary nail fixation of a right proximal femur fracture. Reports that she was reaching for something on the counter and fell backwards resulting in fx. Is currently using a RW for household/community ambulation. Reports that she has recently starting using supplemental O2 at 1L due to her emphysema worsening. States that her pain is nonexistent at rest however is a 5/10 while ambulating. Reports that overall generalized weakness has worsen since surgery. Past Medical History/Comorbidities:   Ms. Shyann Wolf  has a past medical history of Arrhythmia, Arthritis, CAD (coronary artery disease) (2009, 8/19/2013), Carotid stenosis, right, Chronic anxiety (4/27/2017), COPD, Depression (5/6/2021), Emphysema lung ([address] GERD (gastroesophageal reflux disease), History of echocardiogram (06/07/2019), History of echocardiogram, Hypertension, Nausea & vomiting, Oxygen dependent, Pleurisy, and Thyroid disease.     She has no past medical history of Aneurysm ([address] Asthma, Autoimmune disease ([address] Cancer ([address] Chronic kidney disease, Chronic pain, Coagulation disorder ([address] Diabetes ([address] Endocarditis, Heart failure ([facility] Ill-defined condition, Liver disease, Morbid obesity ([facility] PUD (peptic ulcer disease), Rheumatic fever, Seizures ([facility] Sleep apnea, Stroke ([facility] or Thromboembolus ([facility] Ms. Camryn Cortes  has a past surgical history that includes hx tonsil and adenoidectomy; hx gyn; vascular surgery procedure unlist (Right, 11/25/2019); hx heart catheterization (06/06/2019); and hx other surgical.  Social History/Living Environment:    lives in a one story house with spouse that has one step to enter the house   Prior Level of Function/Work/Activity:  Independent  Dominant Side:         RIGHT      Ambulatory/Rehab Services H2 Model Falls Risk Assessment    Risk Factors:       (5)  History of Recent Falls [w/in 3 months] Ability to Rise from Chair:       (4)  Unable to rise without assistance    Falls Prevention Plan: Mobility Assistance Device (specify):  RW   Total: (5 or greater = High Risk): 9    ©2010 [facility] [address] [place] [address] Patent #[id number]. Federal Law prohibits the replication, distribution or use without written permission from [facility] [employer]     Current Medications:      Current Outpatient Medications:     traMADoL (ULTRAM) 50 mg tablet, Take 50 mg by mouth daily. , Disp: , Rfl:     acetaminophen (TylenoL) 325 mg tablet, Take  by mouth every four (4) hours as needed for Pain., Disp: , Rfl:     escitalopram oxalate (LEXAPRO) 10 mg tablet, Take 1 Tablet by mouth daily. PCP will continue to prescribe this medication. Indications: anxiousness associated with depression, Disp: 30 Tablet, Rfl: 1    oxyCODONE-acetaminophen (PERCOCET 7.5) 7.5-325 mg per tablet, Take 1 Tablet by mouth every four (4) hours as needed for Pain., Disp: , Rfl:     levalbuterol (Xopenex) 1.25 mg/3 mL nebu, 3 mL by Nebulization route every four (4) hours as needed for Wheezing or Shortness of Breath.  Indications: bronchospasm, Disp: 120 Nebule, Rfl: 11    azithromycin (ZITHROMAX) 500 mg tab, Take 1 Tablet by mouth every Monday, Wednesday, Friday., Disp: 36 Tablet, Rfl: 3    clopidogreL (Plavix) 75 mg tab, Take 1 Tablet by mouth daily. Indications: blood clot prevention following percutaneous coronary intervention, Disp: 90 Tablet, Rfl: 3    predniSONE (DELTASONE) 5 mg tablet, Take 3 Tablets by mouth daily (with breakfast). , Disp: 90 Tablet, Rfl: 1    tiotropium bromide (SPIRIVA RESPIMAT) 2.5 mcg/actuation inhaler, Take 2 Puffs by inhalation daily. , Disp: 4 g, Rfl: 2    traZODone (DESYREL) 50 mg tablet, Take 1 Tablet by mouth nightly as needed for Sleep., Disp: 30 Tablet, Rfl: 3    butalbital-acetaminophen-caffeine (FIORICET, ESGIC) -40 mg per tablet, Take 1 Tablet by mouth every six (6) hours as needed for Headache., Disp: 60 Tablet, Rfl: 1    calcium-vitamin D (OS-DOMINGO +D3) 250 mg-125 unit per tablet, Take 1 Tablet by mouth daily. , Disp: 90 Tablet, Rfl: 3    multivits,Stress Formula-Zinc tablet, Take 1 Tablet by mouth daily. , Disp: 90 Tablet, Rfl: 3    pantoprazole (PROTONIX) 40 mg tablet, Take 1 Tablet by mouth two (2) times a day., Disp: 180 Tablet, Rfl: 0    metoprolol succinate (TOPROL-XL) 100 mg tablet, Take 1 Tablet by mouth daily. , Disp: 90 Tablet, Rfl: 0    methIMAzole (TAPAZOLE) 10 mg tablet, Take 1 Tablet by mouth daily. PCP will continue to prescribe this medication. Indications: overactive thyroid gland, Disp: 30 Tablet, Rfl: 0    LORazepam (ATIVAN) 0.5 mg tablet, Take 1 Tablet by mouth every eight (8) hours as needed for Anxiety or Agitation. Max Daily Amount: 1.5 mg. PCP will prescribe this medication if it is necessary to continue., Disp: 20 Tablet, Rfl: 0    gabapentin (NEURONTIN) 100 mg capsule, Take 1 Capsule by mouth three (3) times daily for 30 days. , Disp: 90 Capsule, Rfl: 0    cetirizine (ZyrTEC) 10 mg tablet, Take 1 Tablet by mouth daily as needed for Allergies. , Disp: 90 Tablet, Rfl: 1    aspirin 81 mg chewable tablet, Take 1 Tablet by mouth daily. , Disp: 90 Tablet, Rfl: 2    torsemide (DEMADEX) 10 mg tablet, Take 1 Tablet by mouth daily. , Disp: 90 Tablet, Rfl: 3    roflumilast (Daliresp) 500 mcg tab tablet, Take 1 Tablet by mouth daily. , Disp: 90 Tablet, Rfl: 3    nitroglycerin (NITROSTAT) 0.4 mg SL tablet, 1 Tablet by SubLINGual route three (3) times daily as needed for Chest Pain. Up to 3 doses. (Patient taking differently: 1 Tablet by SubLINGual route three (3) times daily as needed for Chest Pain. Up to 3 doses for chest pain. then call 911), Disp: 60 Each, Rfl: 11    ezetimibe (ZETIA) 10 mg tablet, Take 1 Tablet by mouth daily. , Disp: 90 Tablet, Rfl: 3    budesonide-formoteroL (Symbicort) 160-4.5 mcg/actuation HFAA, Take 2 Puffs by inhalation two (2) times a day., Disp: 3 Each, Rfl: 3    atorvastatin (LIPITOR) 80 mg tablet, TAKE 1 TABLET BY MOUTH DAILY, Disp: 90 Tablet, Rfl: 3    OTHER, Handicap placard, Disp: 1 Each, Rfl: 0    OXYGEN-AIR DELIVERY SYSTEMS, 1 L/min by Nasal route continuous. , Disp: , Rfl:     multivitamin (ONE A DAY) tablet, Take 1 Tablet by mouth daily. , Disp: , Rfl:    Date Last Reviewed:  4/29/2022   Number of Personal Factors/Comorbidities that affect the Plan of Care: 1-2: MODERATE COMPLEXITY   EXAMINATION:   Observation/Orthostatic Postural Assessment:          Patient presents with RW and  holding her O2 tank. Demonstrates increased kyphosis in thoracic spine as well as significant forward head posture with rounded shoulders. Palpation:          Patient is tender in the hip flexor region of RLE. Gait:   Demonstrates decreased stance time on RLE as well as decreased foot clearance, heel strike, and hip flexion in BLE.           Joint/Muscle ROM Strength Updates   Hip flexion  104 deg on R with pain, WNL L  3+/5 on R, 4-/5 L     Hip abduction Limited R, WNL L  3+/5 BLE     Knee extension WNL 4-/5 BLE     Knee flexion Limited R, WNL L  4-/5 BLE     Hip extension Limited R, WNL L 3+/5 R, 4-/5 L Body Structures Involved:  1. Lungs  2. Bones  3. Joints  4. Muscles  5. Ligaments Body Functions Affected:  1. Sensory/Pain  2. Respiratory  3. Neuromusculoskeletal  4. Movement Related  5. Skin Related Activities and Participation Affected:  1. Mobility  2. Domestic Life  3. Interpersonal Interactions and Relationships  4.  Community, Social and [place]   Number of elements (examined above) that affect the Plan of Care: 3: MODERATE COMPLEXITY   CLINICAL PRESENTATION:   Presentation: Evolving clinical presentation with changing clinical characteristics: MODERATE COMPLEXITY   CLINICAL DECISION MAKING:      Use of outcome tool(s) and clinical judgement create a POC that gives a: Clear prediction of patient's progress: LOW COMPLEXITY              Pain: Initial:   Pain Intensity 1: 2 /10 Post Session:  2/10     [employer] Portal    Variance from POC: GIDEON Mcclendon PT

## 2022-05-02 ENCOUNTER — HOSPITAL ENCOUNTER (OUTPATIENT)
Dept: PHYSICAL THERAPY | Age: 72
Setting detail: RECURRING SERIES
Discharge: HOME | End: 2022-05-05
Payer: MEDICARE

## 2022-05-03 ENCOUNTER — HOSPITAL ENCOUNTER (OUTPATIENT)
Dept: PHYSICAL THERAPY | Age: 72
Discharge: HOME | End: 2022-05-03
Attending: ORTHOPAEDIC SURGERY
Payer: MEDICARE

## 2022-05-03 PROCEDURE — 97110 THERAPEUTIC EXERCISES: CPT

## 2022-05-03 PROCEDURE — 9990 CHARGE CONVERSION

## 2022-05-03 NOTE — PROGRESS NOTES
Flako Pressley  : 1950  [place]  Secondary: [facility]ols [facility] [address], [place] [address]  [phone]   [phone]      Visit Count:  2    OUTPATIENT PHYSICAL THERAPY: Daily Treatment Note 5/3/2022  ICD-10: Treatment Diagnosis: Pain in right hip (M25.551), Muscle weakness (generalized) (M62.81)  Precautions/Allergies:   Promethazine   TREATMENT PLAN:  Effective Dates: 2022 TO 2022 (60 days). Frequency/Duration: 2 times a week for 60 Day(s) MEDICAL/REFERRING DIAGNOSIS:  Pain in right hip [M25.551]   DATE OF ONSET: 22  REFERRING PHYSICIAN: Sanaz Posey MD MD Orders: [place] and treat  Return MD Appointment: NA       Pre-treatment Subjective Report:  Patient reports feeling increased soreness after the exercises that were completed during the evaluation. Pain: Initial:   Pain Intensity 1: 0 /10 Post Session:   Pain:  2/10  Other:    Medications Last Reviewed:  5/3/2022  Updated Objective Findings:  O2 levels remained between 93-96% while being on 1L of supplemental O2. TREATMENT:   THERAPEUTIC EXERCISE: (45 minutes):     Date:  22 Date:  5-3-22 Date:     Activity/Exercise Parameters Parameters Parameters   Mini squats X 10      Bridges X 10  X 10    Hip adduction  X 15 w/ ball           Hip abduction X 10  X 10 BLE standing w/ green TB    Hip extension   X 10 BLE     LAQ  X 10 BLE w/ 4lb     NuStep  L1 x 8'    Step ups   30'' w/ 4in step         MANUAL THERAPY: ( minutes):     Manual Therapy technique and location Date:   Date:   Date:      Parameters Parameters Parameters           MODALITIES ( minutes):    [employer] Portal    Access Code: [email or web address]  URL: [email or web address]  Date: 2022  Prepared by: Merlin Brazil    Exercises  Clamshell with Resistance - 1 x daily - 7 x weekly - 3 sets - 10 reps  Supine Bridge - 1 x daily - 7 x weekly - 3 sets - 10 reps  Mini Squat with Counter Support - 1 x daily - 7 x weekly - 3 sets - 10 reps      Treatment/Session Summary:    · Response to Treatment: Patient required frequent rest breaks throughout today's session due to increased dyspnea. · Communication/Consultation:  None today  · Equipment provided today:  None today  · Recommendations/Intent for next treatment session: Next visit will focus on progressing overall plan of care.  .  Total Treatment Billable Duration:  45 total minutes (45 minutes therex)  PT Patient Time In/Time Out  Time In: 0845  Time Out: 0930    Future Appointments   Date Time Provider Jorgito White   5/5/2022  8:45 AM [place] Gift, PT [facility]   5/9/2022  8:45 AM Sukhwinder Amos, PT [facility]   5/12/2022  8:00 AM [place] Gift, PT [facility]   5/16/2022  8:45 AM [place] Gift, PT [facility]   5/20/2022  8:45 AM [place] Gift, PT [facility]   10/18/2022  8:00 AM Jair Talbert MD [facility]   10/24/2022  8:00 AM [facility] ULTRASOUND 1 [facility]   10/24/2022  8:30 AM Tiffany Latham NP [facility]       Cony Cardona PT

## 2022-05-05 ENCOUNTER — HOSPITAL ENCOUNTER (OUTPATIENT)
Dept: PHYSICAL THERAPY | Age: 72
Discharge: HOME | End: 2022-05-05
Attending: ORTHOPAEDIC SURGERY
Payer: MEDICARE

## 2022-05-05 PROCEDURE — 97110 THERAPEUTIC EXERCISES: CPT

## 2022-05-05 PROCEDURE — 9990 CHARGE CONVERSION

## 2022-05-05 NOTE — PROGRESS NOTES
Jennifer Pierre  : 1950  [place]  Secondary: [facility] [address], [place] [address]  [phone]   [phone]      Visit Count:  3    OUTPATIENT PHYSICAL THERAPY: Daily Treatment Note 2022  ICD-10: Treatment Diagnosis: Pain in right hip (M25.551), Muscle weakness (generalized) (M62.81)  Precautions/Allergies:   Promethazine   TREATMENT PLAN:  Effective Dates: 2022 TO 2022 (60 days). Frequency/Duration: 2 times a week for 60 Day(s) MEDICAL/REFERRING DIAGNOSIS:  Pain in right hip [M25.551]   DATE OF ONSET: 22  REFERRING PHYSICIAN: Kristin Yang MD MD Orders: Eval and treat  Return MD Appointment: NA       Pre-treatment Subjective Report:  Patient reports that she was sore for the following 24 hours after last session, states that she has increased dyspnea today due to humidity outside.       Pain: Initial:   Pain Intensity 1: 2 /10 Post Session:   Pain:  2/10  Other:    Medications Last Reviewed:  2022  Updated Objective Findings:  None Today   TREATMENT:   THERAPEUTIC EXERCISE: (55 minutes):     Date:  22 Date:  5-3-22 Date:  22   Activity/Exercise Parameters Parameters Parameters   Mini squats X 10      Bridges X 10  X 10 2 x 10   Hip adduction  X 15 w/ ball     Lower trunk rotation   X 10 B    Hip abduction X 10  X 10 BLE standing w/ green TB X 10 BLE w/ 4 lb    Hip extension   X 10 BLE  X 10 BLE    LAQ  X 10 BLE w/ 4lb  X 10 BLE w/ 4lb    NuStep  L1 x 8' L1 x 5'   Step ups   30'' w/ 4in step     SLR    X 10 w/ 3lb    Lumbar stretch    X 10 forward, lateral green physioball    Seated marching    X 10 BLE w/ 4lb    Knee to chest    X 10 BLE                    MANUAL THERAPY: ( minutes):     Manual Therapy technique and location Date:   Date:   Date:      Parameters Parameters Parameters           MODALITIES ( minutes):    [employer] Portal    Access Code: [email or web address]  URL: [email or web address]  Date: 05/03/2022  Prepared by: Yakov Jordan    Exercises  Clamshell with Resistance - 1 x daily - 7 x weekly - 3 sets - 10 reps  Supine Bridge - 1 x daily - 7 x weekly - 3 sets - 10 reps  Mini Squat with Counter Support - 1 x daily - 7 x weekly - 3 sets - 10 reps      Treatment/Session Summary:    · Response to Treatment: Patient continues to require consistent rest breaks throughout session, O2 levels were 93-98%. · Communication/Consultation:  None today  · Equipment provided today:  None today  · Recommendations/Intent for next treatment session: Next visit will focus on progressing overall plan of care.  .  Total Treatment Billable Duration:  45 total minutes (45 minutes therex)  PT Patient Time In/Time Out  Time In: 0845  Time Out: 0940    Future Appointments   Date Time Provider Jorgito White   5/9/2022  8:45 AM Demond Rico, PT [facility]   5/12/2022  8:00 AM Demond Rico, PT [facility]   5/16/2022  8:45 AM Demond Rico, PT [facility]   5/20/2022  8:45 AM Demond Rico, PT [facility]   10/18/2022  8:00 AM Donovan Jesus MD [facility]   10/24/2022  8:00 AM [facility] ULTRASOUND 1 [facility]   10/24/2022  8:30 AM Standard, Suellyn Primrose, NP [facility]       Yakov Jordan, PT

## 2022-05-09 ENCOUNTER — HOSPITAL ENCOUNTER (OUTPATIENT)
Dept: PHYSICAL THERAPY | Age: 72
Discharge: HOME | End: 2022-05-09
Attending: ORTHOPAEDIC SURGERY
Payer: MEDICARE

## 2022-05-09 PROCEDURE — 97110 THERAPEUTIC EXERCISES: CPT

## 2022-05-09 PROCEDURE — 9990 CHARGE CONVERSION

## 2022-05-09 NOTE — PROGRESS NOTES
Mary Lou Arnett  : 1950  [place]  Secondary: Wil Echols at [place] [address], [address], [place] [address]  [phone]   [phone]      Visit Count:  4    OUTPATIENT PHYSICAL THERAPY: Daily Treatment Note 2022  ICD-10: Treatment Diagnosis: Pain in right hip (M25.551), Muscle weakness (generalized) (M62.81)  Precautions/Allergies:   Promethazine   TREATMENT PLAN:  Effective Dates: 2022 TO 2022 (60 days). Frequency/Duration: 2 times a week for 60 Day(s) MEDICAL/REFERRING DIAGNOSIS:  Pain in right hip [M25.551]   DATE OF ONSET: 22  REFERRING PHYSICIAN: Sherri Duncan MD MD Orders: [place] and treat  Return MD Appointment: NA       Pre-treatment Subjective Report:  Patient reports that she is feeling better than the previous session. States that she's been ambulating around her home without her RW.     Pain: Initial:   Pain Intensity 1: 2 10 Post Session:   Pain:  2/10  Other:    Medications Last Reviewed:  2022  Updated Objective Findings:  None Today   TREATMENT:   THERAPEUTIC EXERCISE: (55 minutes):     Date:  22 Date:  5-3-22 Date:  22 Date:   22   Activity/Exercise Parameters Parameters Parameters    Mini squats X 10    Sit to stands from low level mat x 10    Bridges X 10  X 10 2 x 10 X 10 w/ posterior pelvic tilt   Hip adduction  X 15 w/ ball      Lower trunk rotation   X 10 B  X 10 B    Hip abduction X 10  X 10 BLE standing w/ green TB X 10 BLE w/ 4 lb  39'' w/ yellow TB    Hip extension   X 10 BLE  X 10 BLE     LAQ  X 10 BLE w/ 4lb  X 10 BLE w/ 4lb  X 10 BLE w/ 4lb   NuStep  L1 x 8' L1 x 5' L1 for 3 bouts of 2 min   Step ups   30'' w/ 4in step   39'' w/ 6 in step in // bars, 39'' w/ high march    SLR    X 10 w/ 3lb  X 10 w/ 4lb BLE   Lumbar stretch    X 10 forward, lateral green physioball  X 10 forward, lateral green physioball    Seated marching    X 10 BLE w/ 4lb  X 10 BLE w/ 4lb    Knee to chest    X 10 BLE  X 4 w/ 8 sec holds   Lateral steps     30'' w/ 6 in box               MANUAL THERAPY: ( minutes):     Manual Therapy technique and location Date:   Date:   Date:      Parameters Parameters Parameters           MODALITIES ( minutes):    [employer]    Access Code: [email or web address]  URL: [email or web address]  Date: 05/03/2022  Prepared by: Mahsa Carvalho    Exercises  Clamshell with Resistance - 1 x daily - 7 x weekly - 3 sets - 10 reps  Supine Bridge - 1 x daily - 7 x weekly - 3 sets - 10 reps  Mini Squat with Counter Support - 1 x daily - 7 x weekly - 3 sets - 10 reps      Treatment/Session Summary:    · Response to Treatment: Patient tolerated session much better than previous sessions, able to complete increased continous therex without requiring too many rest breaks. O2 levels dropped to 92% during lateral steps. · Communication/Consultation:  None today  · Equipment provided today:  None today  · Recommendations/Intent for next treatment session: Next visit will focus on progressing overall plan of care.    Total Treatment Billable Duration:  55 total minutes (55 minutes therex)  PT Patient Time In/Time Out  Time In: 7378  Time Out: 6130    Future Appointments   Date Time Provider Jorgito White   5/12/2022  8:00 AM Mae Mccurdy, [facility]   5/16/2022  9:45 AM Александр Jalloh, PT [facility]   5/20/2022  8:45 AM Александр Jalloh, PT [facility]   10/24/2022  8:00 AM [facility] ULTRASOUND 1 [facility]       Mahsa Carvalho, PT

## 2022-05-12 ENCOUNTER — HOSPITAL ENCOUNTER (OUTPATIENT)
Dept: PHYSICAL THERAPY | Age: 72
Discharge: HOME | End: 2022-05-12
Attending: ORTHOPAEDIC SURGERY
Payer: MEDICARE

## 2022-05-12 PROCEDURE — 97110 THERAPEUTIC EXERCISES: CPT

## 2022-05-12 PROCEDURE — 9990 CHARGE CONVERSION

## 2022-05-12 NOTE — PROGRESS NOTES
Patricio Wu  : 1950  [place]  Secondary: Wil Echols at [facility]  [address], [address], [place]  [phone]   [phone]      Visit Count:  5    OUTPATIENT PHYSICAL THERAPY: Daily Treatment Note 2022  ICD-10: Treatment Diagnosis: Pain in right hip (M25.551), Muscle weakness (generalized) (M62.81)  Precautions/Allergies:   Promethazine   TREATMENT PLAN:  Effective Dates: 2022 TO 2022 (60 days). Frequency/Duration: 2 times a week for 60 Day(s) MEDICAL/REFERRING DIAGNOSIS:  Pain in right hip [M25.551]   DATE OF ONSET: 22  REFERRING PHYSICIAN: Janey Canela MD MD Orders: Eval and treat  Return MD Appointment: NA       Pre-treatment Subjective Report:  Patient presented to PT with complains of chest pain, and requested to sit down for the beginning of session, BP was taken and it was 147/76 HR: 71 O2: 98%. Patient denied any other symptoms and reported that it usually occurs due to increased anxiety, states that she was rushing into the clinic this morning and that there was a significant amount of traffic.     Pain: Initial:   Pain Intensity 1: 0 /10 Post Session:   Pain:  0/10  Other:    Medications Last Reviewed:  2022  Updated Objective Findings:  BP: 147/76, HR: 71 O2: 98%  Taken after taking short walk into clinic, 143/50 HR: 74, taken at end of session: 123/72 HR 76   TREATMENT:   THERAPEUTIC EXERCISE: (35 minutes):     Date:  22 Date:  5-3-22 Date:  22 Date:   22 Date:  22   Activity/Exercise Parameters Parameters Parameters Parameters Parameters   Mini squats X 10    Sit to stands from low level mat x 10     Bridges X 10  X 10 2 x 10 X 10 w/ posterior pelvic tilt    Hip adduction  X 15 w/ ball       Lower trunk rotation   X 10 B  X 10 B  X 15 B    Hip abduction X 10  X 10 BLE standing w/ green TB X 10 BLE w/ 4 lb  39'' w/ yellow TB  2 x 15 w/ blue TB   Hip extension   X 10 BLE  X 10 BLE      LAQ  X 10 BLE w/ 4lb  X 10 BLE w/ 4lb  X 10 BLE w/ 4lb SAQ 2 x 10 w/ 4lb ankle weight   NuStep  L1 x 8' L1 x 5' L1 for 3 bouts of 2 min    Step ups   30'' w/ 4in step   39'' w/ 6 in step in // bars, 39'' w/ high march     SLR    X 10 w/ 3lb  X 10 w/ 4lb BLE    Lumbar stretch    X 10 forward, lateral green physioball  X 10 forward, lateral green physioball  X 10 forward, lateral green physioball    Seated marching    X 10 BLE w/ 4lb  X 10 BLE w/ 4lb  2 x 45'' BLE w/ 4lb, O2 98% HR: 75   Knee to chest    X 10 BLE  X 4 w/ 8 sec holds X 8 w/ 10 sec holds   Lateral steps     30'' w/ 6 in box     Upper thoracic rotation      5 x 10'' holds       MANUAL THERAPY: ( minutes):     Manual Therapy technique and location Date:   Date:   Date:      Parameters Parameters Parameters           MODALITIES ( minutes):    [employer] Portal    Access Code: [email or web address]  URL: [email or web address]  Date: 05/03/2022  Prepared by: Shayy Aguilar    Exercises  Clamshell with Resistance - 1 x daily - 7 x weekly - 3 sets - 10 reps  Supine Bridge - 1 x daily - 7 x weekly - 3 sets - 10 reps  Mini Squat with Counter Support - 1 x daily - 7 x weekly - 3 sets - 10 reps      Treatment/Session Summary:    · Response to Treatment: Due to patient presenting with relatively high blood pressure and high anxiety most of the exercises done today were supine exercises. Patient tolerated supine exercises great and reported no complains of dyspnea or angina throughout session, reports that the chest pressure that she was feeling prior to session was because of increased anxiety. BP was taken throughout session and was WNL. · Communication/Consultation:  None today  · Equipment provided today:  None today  · Recommendations/Intent for next treatment session: Next visit will focus on progressing overall plan of care.    Total Treatment Billable Duration:  35 total minutes (35 minutes therex)  PT Patient Time In/Time Out  Time In: 0815  Time Out: 5757    Future Appointments   Date Time Provider Jorgito White   5/16/2022  9:45 AM Delonte Kincaid, PT [facility]   5/20/2022  8:45 AM Ct Angel, PT [facility]   10/24/2022  8:00 AM [facility] 1 [facility]       Vito Juliana, PT

## 2022-05-16 ENCOUNTER — HOSPITAL ENCOUNTER (OUTPATIENT)
Dept: PHYSICAL THERAPY | Age: 72
Discharge: HOME | End: 2022-05-16
Attending: ORTHOPAEDIC SURGERY
Payer: MEDICARE

## 2022-05-16 PROCEDURE — 97110 THERAPEUTIC EXERCISES: CPT

## 2022-05-16 PROCEDURE — 9990 CHARGE CONVERSION

## 2022-05-16 NOTE — PROGRESS NOTES
Tip Dominguez  : 1950  [place]  Secondary: Wil Echols at [place] [address], [address], [place] [address]  [phone]   [phone]      Visit Count:  6    OUTPATIENT PHYSICAL THERAPY: Daily Treatment Note 2022  ICD-10: Treatment Diagnosis: Pain in right hip (M25.551), Muscle weakness (generalized) (M62.81)  Precautions/Allergies:   Promethazine   TREATMENT PLAN:  Effective Dates: 2022 TO 2022 (60 days). Frequency/Duration: 2 times a week for 60 Day(s) MEDICAL/REFERRING DIAGNOSIS:  Pain in right hip [M25.551]   DATE OF ONSET: 22  REFERRING PHYSICIAN: Clayton Piña MD MD Orders: Eval and treat  Return MD Appointment: NA       Pre-treatment Subjective Report:  Patient reports that she had a difficult day yesterday due to increased dyspnea but that today she is feeling better.     Pain: Initial:   Pain Intensity 1: 0 /10 Post Session:   Pain:  0/10  Other:    Medications Last Reviewed:  2022  Updated Objective Findings:  None Today    TREATMENT:   THERAPEUTIC EXERCISE: (35 minutes):     Date:  22 Date:  5-3-22 Date:  22 Date:   22 Date:  22 Date:   22   Activity/Exercise Parameters Parameters Parameters Parameters Parameters Parameters    Mini squats X 10    Sit to stands from low level mat x 10   Sit to stands from low level mat x 10    Bridges X 10  X 10 2 x 10 X 10 w/ posterior pelvic tilt  X 10 w/ manual resistance for hip abd   Hip adduction  X 15 w/ ball        Lower trunk rotation   X 10 B  X 10 B  X 15 B     Hip abduction X 10  X 10 BLE standing w/ green TB X 10 BLE w/ 4 lb  45'' w/ yellow TB  2 x 15 w/ blue TB 2 x 15 w/ green TB   Hip extension   X 10 BLE  X 10 BLE    X 10 while standing on 6'' box w/ green TB   LAQ  X 10 BLE w/ 4lb  X 10 BLE w/ 4lb  X 10 BLE w/ 4lb SAQ 2 x 10 w/ 4lb ankle weight X 15 BLE w/ 3lb    NuStep  L1 x 8' L1 x 5' L1 for 3 bouts of 2 min  L1 for 1 bout of 4 min and another bout for 3 min   Step ups   30'' w/ 4in step   39'' w/ 6 in step in // bars, 39'' w/ high march   45'' w/ 6 in step in // bars   SLR    X 10 w/ 3lb  X 10 w/ 4lb BLE  X 15    Lumbar stretch    X 10 forward, lateral green physioball  X 10 forward, lateral green physioball  X 10 forward, lateral green physioball     Seated marching    X 10 BLE w/ 4lb  X 10 BLE w/ 4lb  2 x 45'' BLE w/ 4lb, O2 98% HR: 75 X 10 w/ 3lb ankle weights   Knee to chest    X 10 BLE  X 4 w/ 8 sec holds X 8 w/ 10 sec holds    Lateral steps     30'' w/ 6 in box   39'' w/ 6 in box    Upper thoracic rotation      5 x 10'' holds    Hip flexor stretch      5 x 10'' hold standing        MANUAL THERAPY: ( minutes):     Manual Therapy technique and location Date:   Date:   Date:      Parameters Parameters Parameters           MODALITIES ( minutes):    [employer] Portal    Access Code: [email or web address]  URL: [email or web address]  Date: 05/03/2022  Prepared by: Mirta Cabrera    Exercises  Clamshell with Resistance - 1 x daily - 7 x weekly - 3 sets - 10 reps  Supine Bridge - 1 x daily - 7 x weekly - 3 sets - 10 reps  Mini Squat with Counter Support - 1 x daily - 7 x weekly - 3 sets - 10 reps      Treatment/Session Summary:    · Response to Treatment: Patient was able to tolerate session better today, continues to require frequent seated rest breaks due to dyspnea. Vitals continue to look good throughout session with O2 staying above 93% and HR staying between 70-90, however HR did increase to 110 after completing sit to stands. · Communication/Consultation:  None today  · Equipment provided today:  None today  · Recommendations/Intent for next treatment session: Next visit will focus on progressing overall plan of care.    Total Treatment Billable Duration:  50 total minutes (50 minutes therex)  PT Patient Time In/Time Out  Time In: 0940  Time Out: 1030    Future Appointments   Date Time Provider Jorgito Cindy   5/20/2022  8:45 AM Mj Castorena, PT [facility]   10/24/2022  8:00 AM [facility] MIR Mcclendon PT

## 2022-05-20 ENCOUNTER — HOSPITAL ENCOUNTER (OUTPATIENT)
Dept: PHYSICAL THERAPY | Age: 72
Discharge: HOME | End: 2022-05-20
Attending: ORTHOPAEDIC SURGERY
Payer: MEDICARE

## 2022-05-20 PROCEDURE — 9990 CHARGE CONVERSION

## 2022-05-20 PROCEDURE — 97110 THERAPEUTIC EXERCISES: CPT

## 2022-05-20 NOTE — PROGRESS NOTES
Dimas Draft  : 1950  [place]  Secondary: Wil Echols at [place] [address], [address], [place] [address]  [phone]   [phone]      Visit Count:  7    OUTPATIENT PHYSICAL THERAPY: Daily Treatment Note 2022  ICD-10: Treatment Diagnosis: Pain in right hip (M25.551), Muscle weakness (generalized) (M62.81)  Precautions/Allergies:   Promethazine   TREATMENT PLAN:  Effective Dates: 2022 TO 2022 (60 days). Frequency/Duration: 2 times a week for 60 Day(s) MEDICAL/REFERRING DIAGNOSIS:  Pain in right hip [M25.551]   DATE OF ONSET: 22  REFERRING PHYSICIAN: Mihaela Garrido MD MD Orders: Rahel Grant and treat  Return MD Appointment: NA       Pre-treatment Subjective Report:  Patient reports that she's doing good today.     Pain: Initial:   Pain Intensity 1: 0 /10 Post Session:   Pain:  0/10  Other:    Medications Last Reviewed:  2022  Updated Objective Findings:  None Today    TREATMENT:   THERAPEUTIC EXERCISE: (45 minutes):     Date:  22 Date:  5-3-22 Date:  22 Date:   22 Date:  22 Date:   22 Date:   22   Activity/Exercise Parameters Parameters Parameters Parameters Parameters Parameters     Mini squats X 10    Sit to stands from low level mat x 10   Sit to stands from low level mat x 10  Sit to stands from low level mat x 10, x 10 w/ chest press w/ 6lb med ball   Bridges X 10  X 10 2 x 10 X 10 w/ posterior pelvic tilt  X 10 w/ manual resistance for hip abd X 10 w/ manual resistance for hip abd   Hip adduction  X 15 w/ ball         Lower trunk rotation   X 10 B  X 10 B  X 15 B      Hip abduction X 10  X 10 BLE standing w/ green TB X 10 BLE w/ 4 lb  45'' w/ yellow TB  2 x 15 w/ blue TB 2 x 15 w/ green TB 2 x 15 w/ green TB on 6'' box    Hip extension   X 10 BLE  X 10 BLE    X 10 while standing on 6'' box w/ green TB 2 x 15 w/ green TB on 6'' box    LAQ  X 10 BLE w/ 4lb  X 10 BLE w/ 4lb  X 10 BLE w/ 4lb SAQ 2 x 10 w/ 4lb ankle weight X 15 BLE w/ 3lb  X 15 w/ manual resistance   NuStep  L1 x 8' L1 x 5' L1 for 3 bouts of 2 min  L1 for 1 bout of 4 min and another bout for 3 min L1.3 for 2 bouts of 3 min   Step ups   30'' w/ 4in step   39'' w/ 6 in step in // bars, 39'' w/ high march   45'' w/ 6 in step in // bars 39'' w/ 6 in step in // bars   SLR    X 10 w/ 3lb  X 10 w/ 4lb BLE  X 15  X 15    Lumbar stretch    X 10 forward, lateral green physioball  X 10 forward, lateral green physioball  X 10 forward, lateral green physioball      Seated marching    X 10 BLE w/ 4lb  X 10 BLE w/ 4lb  2 x 45'' BLE w/ 4lb, O2 98% HR: 75 X 10 w/ 3lb ankle weights    Knee to chest    X 10 BLE  X 4 w/ 8 sec holds X 8 w/ 10 sec holds     Lateral steps     30'' w/ 6 in box   39'' w/ 6 in box     Upper thoracic rotation      5 x 10'' holds     Hip flexor stretch      5 x 10'' hold standing  5 x 10'' hold standing, 5 x 10'' side-lying        MANUAL THERAPY: ( minutes):     Manual Therapy technique and location Date:   Date:   Date:      Parameters Parameters Parameters           MODALITIES ( minutes):    [employer] Portal    Access Code: [email or web address]  URL: [email or web address]  Date: 05/03/2022  Prepared by: Olamide Ray    Exercises  Clamshell with Resistance - 1 x daily - 7 x weekly - 3 sets - 10 reps  Supine Bridge - 1 x daily - 7 x weekly - 3 sets - 10 reps  Mini Squat with Counter Support - 1 x daily - 7 x weekly - 3 sets - 10 reps      Treatment/Session Summary:    · Response to Treatment: Patient tolerated session well today, demonstrated improvement in eccentric control while completing sit to stands. · Communication/Consultation:  None today  · Equipment provided today:  None today  · Recommendations/Intent for next treatment session: Next visit will focus on progressing overall plan of care.    Total Treatment Billable Duration:  45 total minutes (45 minutes therex)  PT Patient Time In/Time Out  Time In: 0845  Time Out: 0930    Future Appointments   Date Time Provider Jorgito White   10/24/2022  8:00 AM [facility] MIR Ray PT

## 2022-05-24 ENCOUNTER — HOSPITAL ENCOUNTER (OUTPATIENT)
Dept: PHYSICAL THERAPY | Age: 72
Setting detail: RECURRING SERIES
Discharge: HOME | End: 2022-05-27
Payer: MEDICARE

## 2022-05-24 PROCEDURE — 97110 THERAPEUTIC EXERCISES: CPT

## 2022-05-24 ASSESSMENT — PAIN SCALES - GENERAL: PAINLEVEL_OUTOF10: 3

## 2022-05-24 NOTE — PROGRESS NOTES
Maria Guadalupe Riding  : 1950  Primary:   Secondary:  [facility]  [address] [place] [address]  Phone: [phone]  Fax: [phone] No data recorded  Plan of Care/Certification Expiration Date: 22      PT Visit Info: Total # of Visits to Date: 8      OUTPATIENT PHYSICAL THERAPY:OP NOTE TYPE: Treatment Note 2022       Episode   Appt Desk       Treatment Diagnosis:  Pain in right hip (M25.551), Muscle weakness (generalized) (M62.81)  Medical/Referring Diagnosis:  Pain in right hip [M25.551]  Referring Physician:  Tye Lynn MD MD Orders:  PT Eval and Treat   Date of Onset:  Onset Date: 22     Allergies:  Promethazine  Restrictions/Precautions:    Restrictions/Precautions: None  No data recorded   Interventions Planned (Treatment may consist of any combination of the following):    1. Gait Training  2. Home Exercise Program (HEP)  3. Manual Therapy  4. Neuromuscular Re-education/Strengthening  5. Range of Motion (ROM)  6. Therapeutic Activites  7. Therapeutic Exercise/Strengthening  Subjective Comments:  Patient states that she is having increased difficulty breathing today due to the humidity outside. States that she had R groin pain after last session that lasted for longer than 24 hours. Initial:     3/10 Post Session:     3/10  Medications Last Reviewed:  2022  Updated Objective Findings:  None Today  Treatment   THERAPEUTIC EXERCISE: (45 minutes):    Exercises per grid below to improve mobility, strength and coordination. Required moderate visual, verbal, manual and tactile cues to promote proper body alignment, promote proper body posture and promote proper body mechanics. Progressed resistance, range and repetitions as indicated.      Date:  22 Date:   Date:     Activity/Exercise Parameters Parameters Parameters   NuStep L1 for a 3 min bout and 1 min bout     LAQ  X 10 BLE w/ 4lb ankle weights     Marching  Seated for 30'' and 45'' w/ 4lb ankle weights     Gait 1 lap w/o RW     Hip abduction 2 x 10 green TB clamshells     Supine bridges  2 x 10     Hip flexor stretch Side-lying 6 x 20'' holds     Hip extension 2 x 10 BLE          MANUAL THERAPY: (0 minutes):   Joint mobilization, Soft tissue mobilization and Manipulation was utilized and necessary because of the patient's painful spasm, loss of articular motion and restricted motion of soft tissue. MODALITIES: (0 minutes):              Treatment/Session Summary:    · Treatment Assessment:   Patient had difficulty with today's session due to increased dyspnea. O2 levels stayed above 93% throughout entire appointment, however patient required frequent rest breaks to decrease dyspnea. · Communication/Consultation:  None today  · Equipment provided today:  None  · Recommendations/Intent for next treatment session: Next visit will focus on progressing plan of care and improving overall cardiovascular endurance.      Total Treatment Billable Duration:  45 minutes therex  Time In: 0845  Time Out: SKYLAR Mc       Post Session Pain  Charge Capture  [employer] Portal  MD Guidelines  Scanned Media  Benefits  MyChart

## 2022-05-27 ENCOUNTER — HOSPITAL ENCOUNTER (OUTPATIENT)
Dept: PHYSICAL THERAPY | Age: 72
Setting detail: RECURRING SERIES
Discharge: HOME | End: 2022-05-30
Payer: MEDICARE

## 2022-05-27 PROCEDURE — 97110 THERAPEUTIC EXERCISES: CPT

## 2022-05-27 ASSESSMENT — PAIN SCALES - GENERAL: PAINLEVEL_OUTOF10: 1

## 2022-05-27 NOTE — PROGRESS NOTES
Charbel Mortensen  : 1950  Primary:   Secondary:  [facility]  [address] [facility] [address]  Phone: [phone]  Fax: [phone] No data recorded  Plan of Care/Certification Expiration Date: 22      PT Visit Info: Total # of Visits to Date: 8      OUTPATIENT PHYSICAL THERAPY:OP NOTE TYPE: Treatment Note 2022       Episode   Appt Desk       Treatment Diagnosis:  Pain in right hip (M25.551), Muscle weakness (generalized) (M62.81)  Medical/Referring Diagnosis:  Pain in right hip [M25.551]  Referring Physician:  Leonardo Mccann MD MD Orders:  PT Eval and Treat   Date of Onset:  Onset Date: 22     Allergies:  Promethazine  Restrictions/Precautions:    Restrictions/Precautions: None  No data recorded   Interventions Planned (Treatment may consist of any combination of the following):    1. Gait Training  2. Home Exercise Program (HEP)  3. Manual Therapy  4. Neuromuscular Re-education/Strengthening  5. Range of Motion (ROM)  6. Therapeutic Activites  7. Therapeutic Exercise/Strengthening  Subjective Comments:  Patient states that she is doing okay today, however the weather has still been a factor with her breathing. Initial:     1/10 Post Session:     1/10  Medications Last Reviewed:  2022  Updated Objective Findings:  None Today  Treatment   THERAPEUTIC EXERCISE: (45 minutes):    Exercises per grid below to improve mobility, strength and coordination. Required moderate visual, verbal, manual and tactile cues to promote proper body alignment, promote proper body posture and promote proper body mechanics. Progressed resistance, range and repetitions as indicated.      Date:  22 Date:   Date:     Activity/Exercise Parameters Parameters Parameters   NuStep L1 for a 3 min bout and 1 min bout L1 for 2 bouts of 4 min    LAQ  X 10 BLE w/ 4lb ankle weights X 10 w/ manual resistance     Marching  Seated for 30'' and 45'' w/ 4lb ankle weights     Gait 1 lap w/o RW 2 laps w/o RW    Hip abduction 2 x 10 green TB clamshells 2 x 10 w/ blue TB clamshells, x 10 w/ straight leg    Supine bridges  2 x 10 X 10, x 10 w/ manual resistance    Hip flexor stretch Side-lying 6 x 20'' holds Side-lying 6 x 20'' holds    Hip extension 2 x 10 BLE      Lateral steps   4 x 100'     Adductor squeezes   X 15 w/ ball    SLR  2 x 10         MANUAL THERAPY: (0 minutes):   Joint mobilization, Soft tissue mobilization and Manipulation was utilized and necessary because of the patient's painful spasm, loss of articular motion and restricted motion of soft tissue. MODALITIES: (0 minutes):              Treatment/Session Summary:    · Treatment Assessment:   Patient did better in today's session was able to complete 2 bouts of 4 minutes on the NuStep with O2 levels staying above 95%. · Communication/Consultation:  None today  · Equipment provided today:  None  · Recommendations/Intent for next treatment session: Next visit will focus on progressing plan of care and improving overall cardiovascular endurance.      Total Treatment Billable Duration:  45 minutes therex  Time In: 0845  Time Out: SKYLAR Mc       Post Session Pain  Charge Capture  [employer] Portal  MD Guidelines  Scanned Media  Benefits  MyChart

## 2022-05-31 ENCOUNTER — HOSPITAL ENCOUNTER (OUTPATIENT)
Dept: PHYSICAL THERAPY | Age: 72
Setting detail: RECURRING SERIES
Discharge: HOME | End: 2022-06-03
Payer: MEDICARE

## 2022-05-31 PROCEDURE — 97110 THERAPEUTIC EXERCISES: CPT

## 2022-05-31 NOTE — PROGRESS NOTES
Emy Bravo  : 1950  Primary:   Secondary:  [facility]  [address] [place] [address]  Phone: [phone]  Fax: [phone] No data recorded  Plan of Care/Certification Expiration Date: 22      PT Visit Info: Total # of Visits to Date: 10      OUTPATIENT PHYSICAL THERAPY:OP NOTE TYPE: Treatment Note 2022       Episode   Appt Desk       Treatment Diagnosis:  Pain in right hip (M25.551), Muscle weakness (generalized) (M62.81)  Medical/Referring Diagnosis:  Pain in right hip [M25.551]  Referring Physician:  Gamal Thayer MD MD Orders:  PT Eval and Treat   Date of Onset:  Onset Date: 22     Allergies:  Promethazine  Restrictions/Precautions:    Restrictions/Precautions: None  No data recorded   Interventions Planned (Treatment may consist of any combination of the following):    1. Gait Training  2. Home Exercise Program (HEP)  3. Manual Therapy  4. Neuromuscular Re-education/Strengthening  5. Range of Motion (ROM)  6. Therapeutic Activites  7. Therapeutic Exercise/Strengthening  Subjective Comments:  Patient reports that she is feeling better than she was last week. States that overall breathing has improved due to it not being humid anymore. Initial:      /10 Post Session:      /10  Medications Last Reviewed:  2022  Updated Objective Findings:  None Today  Treatment   THERAPEUTIC EXERCISE: (45 minutes):    Exercises per grid below to improve mobility, strength and coordination. Required moderate visual, verbal, manual and tactile cues to promote proper body alignment, promote proper body posture and promote proper body mechanics. Progressed resistance, range and repetitions as indicated.      Date:  22 Date:  22 Date:  22   Activity/Exercise Parameters Parameters Parameters   NuStep L1 for a 3 min bout and 1 min bout L1 for 2 bouts of 4 min L1 for one bout of 5 min   LAQ  X 10 BLE w/ 4lb ankle weights X 10 w/ manual resistance     Marching  Seated for 30'' and 45'' w/ 4lb ankle weights     Gait 1 lap w/o RW 2 laps w/o RW 2 laps w/o RW   Hip abduction 2 x 10 green TB clamshells 2 x 10 w/ blue TB clamshells, x 10 w/ straight leg 2 x 10 while standing on 6'' box    Supine bridges  2 x 10 X 10, x 10 w/ manual resistance    Hip flexor stretch Side-lying 6 x 20'' holds Side-lying 6 x 20'' holds Standing 6 x 15'' holds   Hip extension 2 x 10 BLE   2 x 10 while standing on 6'' box    Lateral steps   4 x 100'  4 x 50' w/ green TB    Adductor squeezes   X 15 w/ ball    SLR  2 x 10     Step ups    2 x 30'' to 8 in box, x 45'' w/ high march    Mini Squats    2 x 10        MANUAL THERAPY: (0 minutes):   Joint mobilization, Soft tissue mobilization and Manipulation was utilized and necessary because of the patient's painful spasm, loss of articular motion and restricted motion of soft tissue. MODALITIES: (0 minutes):              Treatment/Session Summary:    · Treatment Assessment:   Patient was able to tolerate more standing therex when compared to previous sessions. O2 levels stayed above 95% throughout entirety of session. · Communication/Consultation:  None today  · Equipment provided today:  None  · Recommendations/Intent for next treatment session: Next visit will focus on progressing plan of care and improving overall cardiovascular endurance.      Total Treatment Billable Duration:  45 minutes therex  Time In: 1100  Time Out: 1301 [employer] Portal  MD Guidelines  Scanned Media  Benefits  MyChart

## 2022-06-03 ENCOUNTER — HOSPITAL ENCOUNTER (OUTPATIENT)
Dept: PHYSICAL THERAPY | Age: 72
Setting detail: RECURRING SERIES
Discharge: HOME | End: 2022-06-06
Payer: MEDICARE

## 2022-06-03 PROCEDURE — 97110 THERAPEUTIC EXERCISES: CPT

## 2022-06-03 PROCEDURE — 97140 MANUAL THERAPY 1/> REGIONS: CPT

## 2022-06-03 ASSESSMENT — PAIN SCALES - GENERAL: PAINLEVEL_OUTOF10: 6

## 2022-06-03 NOTE — PROGRESS NOTES
Fay Almanza  : 1950  Primary:   Secondary:  [facility]  [address] [facility] [address]  Phone: [phone]  Fax: [phone] Plan Frequency: 1-2 times a week    Plan of Care/Certification Expiration Date: 22      PT Visit Info: Total # of Visits to Date: 6      OUTPATIENT PHYSICAL THERAPY:OP NOTE TYPE: Treatment Note 6/3/2022       Episode   Appt Desk       Treatment Diagnosis:  Pain in right hip (M25.551), Muscle weakness (generalized) (M62.81)  Medical/Referring Diagnosis:  Pain in right hip [M25.551]  Referring Physician:  Fran Gregorio MD MD Orders:  PT Eval and Treat   Date of Onset:  Onset Date: 22     Allergies:  Promethazine  Restrictions/Precautions:    Restrictions/Precautions: None  No data recorded   Interventions Planned (Treatment may consist of any combination of the following):    1. Gait Training  2. Home Exercise Program (HEP)  3. Manual Therapy  4. Neuromuscular Re-education/Strengthening  5. Range of Motion (ROM)  6. Therapeutic Activites  7. Therapeutic Exercise/Strengthening  Subjective Comments:  Patient reports that she feels like we overdid it in the last session because her mid thoracic back has been causing a significant amount of pain. Initial:     6/10 Post Session:     2/10  Medications Last Reviewed:  6/3/2022  Updated Objective Findings:  None Today  Treatment   THERAPEUTIC EXERCISE: (30 minutes):    Exercises per grid below to improve mobility, strength and coordination. Required moderate visual, verbal, manual and tactile cues to promote proper body alignment, promote proper body posture and promote proper body mechanics. Progressed resistance, range and repetitions as indicated.      Date:  22 Date:  22 Date:  22 Date:   6/3/22   Activity/Exercise Parameters Parameters Parameters    NuStep L1 for a 3 min bout and 1 min bout L1 for 2 bouts of 4 min L1 for one bout of 5 min L1 for one bout of 3 min LAQ  X 10 BLE w/ 4lb ankle weights X 10 w/ manual resistance   X 10 w/ manual resistance   Marching  Seated for 30'' and 39'' w/ 4lb ankle weights      Gait 1 lap w/o RW 2 laps w/o RW 2 laps w/o RW 2 laps w/o RW   Hip abduction 2 x 10 green TB clamshells 2 x 10 w/ blue TB clamshells, x 10 w/ straight leg 2 x 10 while standing on 6'' box  X 10 w/ leg extended   Supine bridges  2 x 10 X 10, x 10 w/ manual resistance     Hip flexor stretch Side-lying 6 x 20'' holds Side-lying 6 x 20'' holds Standing 6 x 15'' holds    Hip extension 2 x 10 BLE   2 x 10 while standing on 6'' box     Lateral steps   4 x 100'  4 x 50' w/ green TB     Adductor squeezes   X 15 w/ ball     SLR  2 x 10      Step ups    2 x 30'' to 8 in box, x 45'' w/ high march     Mini Squats    2 x 10     Thoracic stretch     W/ doorframe 5 x 20'' holds   Thoracic extension     X 10               MANUAL THERAPY: (15 minutes):   Joint mobilization, Soft tissue mobilization and Manipulation was utilized and necessary because of the patient's painful spasm, loss of articular motion and restricted motion of soft tissue. · STM/MFR to R scapular complex in side-lying. MODALITIES: (0 minutes):              Treatment/Session Summary:    · Treatment Assessment:   Patient's re-certification was completed today. · Communication/Consultation:  None today  · Equipment provided today:  None  · Recommendations/Intent for next treatment session: Next visit will focus on progressing plan of care and improving overall cardiovascular endurance.      Total Treatment Billable Duration:  45 total minutes (30 minutes therex, 15 minutes manual therapy)  Time In: 0845  Time Out: SKYLAR Mc       Post Session Pain  Charge Capture  [facility]  MD Guidelines  Scanned Media  Benefits  MyChart

## 2022-06-03 NOTE — PLAN OF CARE
Charbel Mortensen  : 1950  Primary: Medicare Part A And B  [facility]  [address]    [address] [place] [address]  Phone: [phone]  Fax: [phone] Plan Frequency: 1-2 times a week    Plan of Care/Certification Expiration Date: 22      PT Visit Info: Total # of Visits to Date: 6      OUTPATIENT PHYSICAL THERAPY:OP NOTE TYPE: Recertification [id number]               Episode  Appt Desk         Treatment Diagnosis:  Pain in Right Hip (M25.551)  Generalized Muscle Weakness (M62.81)  * No diagnoses found *  Medical/Referring Diagnosis:  Pain in right hip [M25.551]  Referring Physician:  Leonardo Mccann MD MD Orders:  PT Eval and Treat   Return MD Appt:  NA  Date of Onset:  Onset Date: 22     Allergies:  Promethazine  Restrictions/Precautions:    Restrictions/Precautions: None  No data recorded   Medications Last Reviewed:  6/3/2022     SUBJECTIVE   History of Injury/Illness (Reason for Referral):  Patient is about 10 weeks out from cephalomedullary nail fixation of a right proximal femur fracture. Reports that she was reaching for something on the counter and fell backwards resulting in fx. Is currently using a RW for household/community ambulation. Reports that she has recently starting using supplemental O2 at 1L due to her emphysema worsening. States that her pain is nonexistent at rest however is a 5/10 while ambulating. Reports that overall generalized weakness has worsen since surgery. Patient Stated Goal(s):  \"Wants to build up her endurance\"  Initial:     6/10 Post Session:     2/10  Past Medical History/Comorbidities:   Ms. Seldon Gottron  has no past medical history on file. Ms. Seldon Gottron  has no past surgical history on file.   Social History/Living Environment:   Lives With: Spouse  Type of Home: House  Home Layout: One level  Home Access: Stairs to enter without rails  Entrance Stairs - Rails: None  Entrance Stairs - Number of Steps: 1     Prior Level of Function/Work/Activity:   No data recordedNo data recordedNo data recorded   Learning:   Does the patient/guardian have any barriers to learning?: No barriers  Will there be a co-learner?: No  What is the preferred language of the patient/guardian?: English  Is an  required?: No  How does the patient/guardian prefer to learn new concepts?: Listening; Reading; Demonstration; Pictures/Videos     Fall Risk Scale: Total Score: 30  Mcintyre Fall Risk: Medium (25-44)     Dominant Side:  right handed        OBJECTIVE   Observation/Orthostatic Postural Assessment:          Patient presents with RW and  holding her O2 tank. Demonstrates increased kyphosis in thoracic spine as well as significant forward head posture with rounded shoulders. Palpation:          Patient is tender in the hip flexor region of RLE. Gait:   Demonstrates decreased stance time on RLE as well as decreased foot clearance, heel strike, and hip flexion in BLE.       UPDATED FINDINGS: Patient is no longer tender in the hip flexor region. Demonstrating equal amount of stance time on BLE as well as not requiring the RW to ambulate.         Joint/Muscle ROM Strength Updates 6/3/22   Hip flexion  104 deg on R with pain, WNL L  3+/5 on R, 4-/5 L   110 deg with no pain R, 110 deg L, 4/5 BLE     Hip abduction Limited R, WNL L  3+/5 BLE   4-/5 BLE    Knee extension WNL 4-/5 BLE   4/5 BLE   Knee flexion Limited R, WNL L  4-/5 BLE   4/5 BLE   Hip extension Limited R, WNL L 3+/5 R, 4-/5 L   4-/5 BLE                                   Patient states that sometimes crossing her legs causes her pain     ASSESSMENT   Initial Assessment:     Ms. Arik Ocampo presents to PT approx. 10 weeks following a cephallomedullary nail fixation for a proximal femur fracture. Pt demonstrates decreased R hip AROM, strength, balance and antalgic gait.   She is currently ambulating with a RW with decreased stance time, stride length, and foot clearance on RLE. Pt reports minimal pain during weightbearing 2/10 and scored a 28/80 on the LEFS. Pt also presented with supplemental O2 on 1 L and reports dyspnea upon exertion, O2 levels remained 90-96% throughout testing. Pt will benefit from skilled PT to address the deficits listed below. UPDATED ASSESSMENT:     As of 6/3/22, Ms. Avelina Garcia has attended 11/11 scheduled visits, with 0 cancellation and 0 no shows. Since starting PT, patient has shown improvement in overall RLE ROM, strength, and activity tolerance. She is currently demonstrating improvement in overall gait mechanics with equal amount of weightbearing/stance time between LE as well as not requiring a RW for household distances. Patients main limitation is overall cardiovascular endurance and increased dyspnea upon exertion at the moment and is still requiring 1L of supplemental oxygen. Additonally patient's LEFS score improved to 46/80 and she has met 5/10 PT goals. Pt will benefit from continued skilled PT services to improve deficits and meet the rest of her goals. Problem List: (Impacting functional limitations): Body Structures, Functions, Activity Limitations Requiring Skilled Therapeutic Intervention: Decreased functional mobility ; Decreased ADL status; Decreased ROM; Decreased strength; Decreased tolerance to work activity; Decreased endurance; Decreased balance; Increased pain; Decreased posture     Therapy Prognosis:   Therapy Prognosis: Fair     Assessment Complexity:   Low Complexity  PLAN   Effective Dates: 6/3/22 TO 3/0/23  Plan of Care/Certification Expiration Date: 08/02/22     Frequency/Duration: Plan Frequency: 1-2 times a week     Interventions Planned (Treatment may consist of any combination of the following):    Current Treatment Recommendations: Strengthening; ROM; Balance training; Functional mobility training; Transfer training;  Endurance training; Gait training; Manual Therapy - Soft Tissue Mobilization; Home exercise program; Modalities     Goals: (Goals have been discussed and agreed upon with patient.)  Short-Term Functional Goals: Time Frame: 2-4 weeks   1. Pt will be compliant and independent with HEP. (MET)  2. Pt will improve score on the LEFS by at least 10 points to increase pt's overall functional mobility. (MET)  3. Pt will improve R hip flexion AROM to 0-120deg to increase pt's ease with transfers and gait. (ONGOING)  4. Pt will be able to increase overall weightbearing through RLE with minimal pain (>3/10) to improve overall gait mechanics. (MET)   5. Pt will be able to ambulate with a near normal gait pattern for community distances with no AD. (ONGOING)    Discharge Goals: Time Frame: 6-8 weeks  1.   Pt will improve score on the LEFS to at least a 60/80 to increase pt's overall functional mobility. (ONGOING)  2.   Pt will improve overall R hip strength to >4/5 to increase independence with activities of daily living. (ONGOING)  3.   Pt will be able to ascend 6-8\" steps reciprocally and descend 6\" steps reciprocally with use of a rail with good form and control. (MET)  4.   Pt will be able to perform a complete sit to stand without any assistance from UEs in order to improve functional mobility. (MET)  5. Pt will be able to wean off of supplemental O2 in order to increase independence with activities of daily living.  (ONGOING)         Outcome Measure: Tool Used: Lower Extremity Functional Scale (LEFS)  Score:  Initial: 28/80 Most Recent: 46/80 (Date: 6/3/22 )   Interpretation of Score: 20 questions each scored on a 5 point scale with 0 representing \"extreme difficulty or unable to perform\" and 4 representing \"no difficulty\". The lower the score, the greater the functional disability. 80/80 represents no disability. Minimal detectable change is 9 points.       Medical Necessity:   Patient is expected to demonstrate progress in strength, range of motion, balance, coordination and functional technique to increase independence with activities of daily living. .  Skilled intervention continues to be required due to impairments listed above. .  Reason For Services/Other Comments:  Patient continues to demonstrate capacity to improve ROM, cardiovascular endurance, balance, strength, and gait mechanics which will increase independence. Patient continues to require skilled intervention due to impairements listed above. .  Total Duration:  Time In: 0845  Time Out: 0930    Regarding Klarissa Nugent's therapy, I certify that the treatment plan above will be carried out by a therapist or under their direction.   Thank you for this referral,  Miguel Hernandez PT     Referring Physician Signature: Aiden Koch MD _______________________________ Date _____________        Post Session Pain  Charge Capture   POC Link  Treatment Note Link  MD Guidelines  MyChar

## 2022-06-07 ENCOUNTER — HOSPITAL ENCOUNTER (OUTPATIENT)
Dept: PHYSICAL THERAPY | Age: 72
Setting detail: RECURRING SERIES
End: 2022-06-07
Payer: MEDICARE

## 2022-06-08 NOTE — PROGRESS NOTES
Mark Bocanegrachment  : 1950  Primary: Medicare Part A And B  Secondary: [facility]  [address] [place] [address] [facility] [address]  Phone: [phone]  Fax: [phone] Plan Frequency: 1-2 times a week    Plan of Care/Certification Expiration Date: 22      PT Visit Info: Total # of Visits to Date: [address] THERAPY 2022     Appt Desk   Episode   MyChart      Ms. Kelsey Rebolledo was a  Same day cancellation due to being sick.      Laurie Barron, PT    Future Appointments   Date Time Provider Desiree Melendez   6/10/2022  8:45 AM Laurie Barron, PT [facility]   10/18/2022  8:00 AM MD OFELIA Back[facility]   10/24/2022  8:00 AM [facility] ULTRASOUND 1 [facility]   10/24/2022  8:30 AM Qian Gates APRN - CNP [facility]

## 2022-06-09 ENCOUNTER — APPOINTMENT (OUTPATIENT)
Dept: GENERAL RADIOLOGY | Age: 72
DRG: 190 | End: 2022-06-09
Payer: MEDICARE

## 2022-06-09 ENCOUNTER — HOSPITAL ENCOUNTER (INPATIENT)
Age: 72
LOS: 3 days | Discharge: HOME | DRG: 190 | End: 2022-06-12
Attending: EMERGENCY MEDICINE
Payer: MEDICARE

## 2022-06-09 DIAGNOSIS — J44.1 COPD EXACERBATION (HCC): Primary | ICD-10-CM

## 2022-06-09 PROBLEM — R07.9 CHEST PAIN: Status: RESOLVED | Noted: 2019-05-31 | Resolved: 2022-01-01

## 2022-06-09 PROBLEM — S32.82XA MULTIPLE CLOSED ANTERIOR-POSTERIOR COMPRESSION FRACTURES OF PELVIS (HCC): Status: RESOLVED | Noted: 2022-02-02 | Resolved: 2022-06-09

## 2022-06-09 PROBLEM — Z66 DNR (DO NOT RESUSCITATE): Status: ACTIVE | Noted: 2021-11-09

## 2022-06-09 PROBLEM — J44.0 COPD WITH ACUTE LOWER RESPIRATORY INFECTION (HCC): Status: RESOLVED | Noted: 2022-01-01 | Resolved: 2022-01-01

## 2022-06-09 PROBLEM — S72.001A CLOSED RIGHT HIP FRACTURE (HCC): Status: RESOLVED | Noted: 2022-02-04 | Resolved: 2022-06-09

## 2022-06-09 PROBLEM — D50.0 IRON DEFICIENCY ANEMIA DUE TO CHRONIC BLOOD LOSS: Status: ACTIVE | Noted: 2022-02-08

## 2022-06-09 PROBLEM — J96.11 CHRONIC RESPIRATORY FAILURE WITH HYPOXIA (HCC): Status: ACTIVE | Noted: 2019-06-08

## 2022-06-09 PROBLEM — R94.31 EKG, ABNORMAL: Status: RESOLVED | Noted: 2022-02-15 | Resolved: 2022-06-09

## 2022-06-09 PROBLEM — J96.20 ACUTE ON CHRONIC RESPIRATORY FAILURE (HCC): Status: ACTIVE | Noted: 2022-01-01

## 2022-06-09 PROBLEM — S06.5XAA SDH (SUBDURAL HEMATOMA): Status: RESOLVED | Noted: 2022-03-18 | Resolved: 2022-06-09

## 2022-06-09 PROBLEM — J43.2 CENTRILOBULAR EMPHYSEMA (HCC): Status: ACTIVE | Noted: 2019-12-20

## 2022-06-09 PROBLEM — S06.5XAA POST-TRAUMATIC SUBDURAL HEMATOMA: Status: RESOLVED | Noted: 2022-03-15 | Resolved: 2022-06-09

## 2022-06-09 PROBLEM — S72.91XA FEMUR FRACTURE, RIGHT (HCC): Status: RESOLVED | Noted: 2022-02-11 | Resolved: 2022-06-09

## 2022-06-09 PROBLEM — S72.8X1A OTHER FRACTURE OF RIGHT FEMUR, INITIAL ENCOUNTER FOR CLOSED FRACTURE (HCC): Status: RESOLVED | Noted: 2022-01-01 | Resolved: 2022-01-01

## 2022-06-09 LAB
ALBUMIN SERPL-MCNC: 3.1 G/DL (ref 3.2–4.6)
ALBUMIN/GLOB SERPL: 0.7 {RATIO} (ref 1.2–3.5)
ALP SERPL-CCNC: 78 U/L (ref 50–136)
ALT SERPL-CCNC: 15 U/L (ref 12–65)
ANION GAP SERPL CALC-SCNC: 7 MMOL/L (ref 7–16)
AST SERPL-CCNC: 18 U/L (ref 15–37)
B PERT DNA SPEC QL NAA+PROBE: NOT DETECTED
BASOPHILS # BLD: 0.1 K/UL (ref 0–0.2)
BASOPHILS NFR BLD: 1 % (ref 0–2)
BILIRUB SERPL-MCNC: 0.5 MG/DL (ref 0.2–1.1)
BORDETELLA PARAPERTUSSIS BY PCR: NOT DETECTED
BUN SERPL-MCNC: 10 MG/DL (ref 8–23)
C PNEUM DNA SPEC QL NAA+PROBE: NOT DETECTED
CALCIUM SERPL-MCNC: 8.9 MG/DL (ref 8.3–10.4)
CHLORIDE SERPL-SCNC: 103 MMOL/L (ref 98–107)
CO2 SERPL-SCNC: 26 MMOL/L (ref 21–32)
CREAT SERPL-MCNC: 0.8 MG/DL (ref 0.6–1)
DIFFERENTIAL METHOD BLD: ABNORMAL
EOSINOPHIL # BLD: 0 K/UL (ref 0–0.8)
EOSINOPHIL NFR BLD: 0 % (ref 0.5–7.8)
ERYTHROCYTE [DISTWIDTH] IN BLOOD BY AUTOMATED COUNT: 14.4 % (ref 11.9–14.6)
FLUAV SUBTYP SPEC NAA+PROBE: NOT DETECTED
FLUBV RNA SPEC QL NAA+PROBE: NOT DETECTED
GLOBULIN SER CALC-MCNC: 4.4 G/DL (ref 2.3–3.5)
GLUCOSE SERPL-MCNC: 80 MG/DL (ref 65–100)
HADV DNA SPEC QL NAA+PROBE: NOT DETECTED
HCOV 229E RNA SPEC QL NAA+PROBE: NOT DETECTED
HCOV HKU1 RNA SPEC QL NAA+PROBE: NOT DETECTED
HCOV NL63 RNA SPEC QL NAA+PROBE: NOT DETECTED
HCOV OC43 RNA SPEC QL NAA+PROBE: NOT DETECTED
HCT VFR BLD AUTO: 34.7 % (ref 35.8–46.3)
HGB BLD-MCNC: 11.1 G/DL (ref 11.7–15.4)
HMPV RNA SPEC QL NAA+PROBE: NOT DETECTED
HPIV1 RNA SPEC QL NAA+PROBE: NOT DETECTED
HPIV2 RNA SPEC QL NAA+PROBE: NOT DETECTED
HPIV3 RNA SPEC QL NAA+PROBE: NOT DETECTED
HPIV4 RNA SPEC QL NAA+PROBE: NOT DETECTED
IMM GRANULOCYTES # BLD AUTO: 0 K/UL (ref 0–0.5)
IMM GRANULOCYTES NFR BLD AUTO: 0 % (ref 0–5)
LYMPHOCYTES # BLD: 1.4 K/UL (ref 0.5–4.6)
LYMPHOCYTES NFR BLD: 20 % (ref 13–44)
M PNEUMO DNA SPEC QL NAA+PROBE: NOT DETECTED
MCH RBC QN AUTO: 28.8 PG (ref 26.1–32.9)
MCHC RBC AUTO-ENTMCNC: 32 G/DL (ref 31.4–35)
MCV RBC AUTO: 90.1 FL (ref 79.6–97.8)
MONOCYTES # BLD: 0.8 K/UL (ref 0.1–1.3)
MONOCYTES NFR BLD: 11 % (ref 4–12)
NEUTS SEG # BLD: 4.8 K/UL (ref 1.7–8.2)
NEUTS SEG NFR BLD: 68 % (ref 43–78)
NRBC # BLD: 0 K/UL (ref 0–0.2)
NT PRO BNP: 3942 PG/ML (ref 5–125)
PLATELET # BLD AUTO: 259 K/UL (ref 150–450)
PMV BLD AUTO: 9.7 FL (ref 9.4–12.3)
POTASSIUM SERPL-SCNC: 3.5 MMOL/L (ref 3.5–5.1)
PROCALCITONIN SERPL-MCNC: <0.05 NG/ML (ref 0–0.49)
PROT SERPL-MCNC: 7.5 G/DL (ref 6.3–8.2)
RBC # BLD AUTO: 3.85 M/UL (ref 4.05–5.2)
RSV RNA SPEC QL NAA+PROBE: NOT DETECTED
RV+EV RNA SPEC QL NAA+PROBE: NOT DETECTED
SARS-COV-2 RNA RESP QL NAA+PROBE: NOT DETECTED
SODIUM SERPL-SCNC: 136 MMOL/L (ref 136–145)
TROPONIN I SERPL HS-MCNC: 31.4 PG/ML (ref 0–14)
WBC # BLD AUTO: 7 K/UL (ref 4.3–11.1)

## 2022-06-09 PROCEDURE — 36415 COLL VENOUS BLD VENIPUNCTURE: CPT

## 2022-06-09 PROCEDURE — 6360000002 HC RX W HCPCS: Performed by: FAMILY MEDICINE

## 2022-06-09 PROCEDURE — 94640 AIRWAY INHALATION TREATMENT: CPT

## 2022-06-09 PROCEDURE — 87040 BLOOD CULTURE FOR BACTERIA: CPT

## 2022-06-09 PROCEDURE — 2580000003 HC RX 258: Performed by: FAMILY MEDICINE

## 2022-06-09 PROCEDURE — 96374 THER/PROPH/DIAG INJ IV PUSH: CPT

## 2022-06-09 PROCEDURE — 2700000000 HC OXYGEN THERAPY PER DAY

## 2022-06-09 PROCEDURE — 97161 PT EVAL LOW COMPLEX 20 MIN: CPT

## 2022-06-09 PROCEDURE — 0202U NFCT DS 22 TRGT SARS-COV-2: CPT

## 2022-06-09 PROCEDURE — 1100000000 HC RM PRIVATE

## 2022-06-09 PROCEDURE — 97165 OT EVAL LOW COMPLEX 30 MIN: CPT

## 2022-06-09 PROCEDURE — 97530 THERAPEUTIC ACTIVITIES: CPT

## 2022-06-09 PROCEDURE — 84145 PROCALCITONIN (PCT): CPT

## 2022-06-09 PROCEDURE — 99285 EMERGENCY DEPT VISIT HI MDM: CPT

## 2022-06-09 PROCEDURE — 80053 COMPREHEN METABOLIC PANEL: CPT

## 2022-06-09 PROCEDURE — 94760 N-INVAS EAR/PLS OXIMETRY 1: CPT

## 2022-06-09 PROCEDURE — 6370000000 HC RX 637 (ALT 250 FOR IP): Performed by: FAMILY MEDICINE

## 2022-06-09 PROCEDURE — 85025 COMPLETE CBC W/AUTO DIFF WBC: CPT

## 2022-06-09 PROCEDURE — 6360000002 HC RX W HCPCS: Performed by: EMERGENCY MEDICINE

## 2022-06-09 PROCEDURE — 84484 ASSAY OF TROPONIN QUANT: CPT

## 2022-06-09 PROCEDURE — 83880 ASSAY OF NATRIURETIC PEPTIDE: CPT

## 2022-06-09 PROCEDURE — 71045 X-RAY EXAM CHEST 1 VIEW: CPT

## 2022-06-09 PROCEDURE — 6370000000 HC RX 637 (ALT 250 FOR IP): Performed by: EMERGENCY MEDICINE

## 2022-06-09 PROCEDURE — 97535 SELF CARE MNGMENT TRAINING: CPT

## 2022-06-09 RX ORDER — ATORVASTATIN CALCIUM 80 MG/1
80 TABLET, FILM COATED ORAL DAILY
Status: DISCONTINUED | OUTPATIENT
Start: 2022-06-09 | End: 2022-06-09

## 2022-06-09 RX ORDER — IPRATROPIUM BROMIDE AND ALBUTEROL SULFATE 2.5; .5 MG/3ML; MG/3ML
1 SOLUTION RESPIRATORY (INHALATION)
Status: COMPLETED | OUTPATIENT
Start: 2022-06-09 | End: 2022-06-09

## 2022-06-09 RX ORDER — METHIMAZOLE 5 MG/1
10 TABLET ORAL DAILY
Status: DISCONTINUED | OUTPATIENT
Start: 2022-06-09 | End: 2022-06-12 | Stop reason: HOSPADM

## 2022-06-09 RX ORDER — ATORVASTATIN CALCIUM 80 MG/1
80 TABLET, FILM COATED ORAL DAILY
Status: DISCONTINUED | OUTPATIENT
Start: 2022-06-09 | End: 2022-06-12 | Stop reason: HOSPADM

## 2022-06-09 RX ORDER — CLOPIDOGREL BISULFATE 75 MG/1
75 TABLET ORAL DAILY
Status: DISCONTINUED | OUTPATIENT
Start: 2022-06-09 | End: 2022-06-12 | Stop reason: HOSPADM

## 2022-06-09 RX ORDER — ONDANSETRON 2 MG/ML
4 INJECTION INTRAMUSCULAR; INTRAVENOUS EVERY 6 HOURS PRN
Status: DISCONTINUED | OUTPATIENT
Start: 2022-06-09 | End: 2022-06-12 | Stop reason: HOSPADM

## 2022-06-09 RX ORDER — ACETAMINOPHEN 325 MG/1
650 TABLET ORAL
Status: COMPLETED | OUTPATIENT
Start: 2022-06-09 | End: 2022-06-09

## 2022-06-09 RX ORDER — METHYLPREDNISOLONE SODIUM SUCCINATE 125 MG/2ML
125 INJECTION, POWDER, LYOPHILIZED, FOR SOLUTION INTRAMUSCULAR; INTRAVENOUS
Status: COMPLETED | OUTPATIENT
Start: 2022-06-09 | End: 2022-06-09

## 2022-06-09 RX ORDER — SODIUM CHLORIDE 0.9 % (FLUSH) 0.9 %
5-40 SYRINGE (ML) INJECTION EVERY 12 HOURS SCHEDULED
Status: DISCONTINUED | OUTPATIENT
Start: 2022-06-09 | End: 2022-06-12 | Stop reason: HOSPADM

## 2022-06-09 RX ORDER — ONDANSETRON 4 MG/1
4 TABLET, ORALLY DISINTEGRATING ORAL EVERY 8 HOURS PRN
Status: DISCONTINUED | OUTPATIENT
Start: 2022-06-09 | End: 2022-06-12 | Stop reason: HOSPADM

## 2022-06-09 RX ORDER — ACETAMINOPHEN 325 MG/1
650 TABLET ORAL EVERY 6 HOURS PRN
Status: DISCONTINUED | OUTPATIENT
Start: 2022-06-09 | End: 2022-06-12 | Stop reason: HOSPADM

## 2022-06-09 RX ORDER — POLYETHYLENE GLYCOL 3350 17 G/17G
17 POWDER, FOR SOLUTION ORAL DAILY PRN
Status: DISCONTINUED | OUTPATIENT
Start: 2022-06-09 | End: 2022-06-12 | Stop reason: HOSPADM

## 2022-06-09 RX ORDER — ALBUTEROL SULFATE 2.5 MG/3ML
2.5 SOLUTION RESPIRATORY (INHALATION)
Status: DISCONTINUED | OUTPATIENT
Start: 2022-06-09 | End: 2022-06-12 | Stop reason: HOSPADM

## 2022-06-09 RX ORDER — SODIUM CHLORIDE 9 MG/ML
INJECTION, SOLUTION INTRAVENOUS PRN
Status: DISCONTINUED | OUTPATIENT
Start: 2022-06-09 | End: 2022-06-12 | Stop reason: HOSPADM

## 2022-06-09 RX ORDER — ESCITALOPRAM OXALATE 10 MG/1
10 TABLET ORAL DAILY
Status: DISCONTINUED | OUTPATIENT
Start: 2022-06-09 | End: 2022-06-12 | Stop reason: HOSPADM

## 2022-06-09 RX ORDER — PANTOPRAZOLE SODIUM 40 MG/1
40 TABLET, DELAYED RELEASE ORAL 2 TIMES DAILY
Status: DISCONTINUED | OUTPATIENT
Start: 2022-06-09 | End: 2022-06-12 | Stop reason: HOSPADM

## 2022-06-09 RX ORDER — ASPIRIN 81 MG/1
81 TABLET, CHEWABLE ORAL DAILY
Status: DISCONTINUED | OUTPATIENT
Start: 2022-06-09 | End: 2022-06-12 | Stop reason: HOSPADM

## 2022-06-09 RX ORDER — METOPROLOL SUCCINATE 100 MG/1
100 TABLET, EXTENDED RELEASE ORAL DAILY
Status: DISCONTINUED | OUTPATIENT
Start: 2022-06-09 | End: 2022-06-12 | Stop reason: HOSPADM

## 2022-06-09 RX ORDER — ALBUTEROL SULFATE 2.5 MG/.5ML
2.5 SOLUTION RESPIRATORY (INHALATION)
Status: COMPLETED | OUTPATIENT
Start: 2022-06-09 | End: 2022-06-09

## 2022-06-09 RX ORDER — SODIUM CHLORIDE 0.9 % (FLUSH) 0.9 %
5-40 SYRINGE (ML) INJECTION PRN
Status: DISCONTINUED | OUTPATIENT
Start: 2022-06-09 | End: 2022-06-12 | Stop reason: HOSPADM

## 2022-06-09 RX ORDER — LEVALBUTEROL INHALATION SOLUTION 1.25 MG/3ML
1.25 SOLUTION RESPIRATORY (INHALATION) EVERY 4 HOURS PRN
Status: DISCONTINUED | OUTPATIENT
Start: 2022-06-09 | End: 2022-06-12 | Stop reason: HOSPADM

## 2022-06-09 RX ORDER — EZETIMIBE 10 MG/1
10 TABLET ORAL DAILY
Status: DISCONTINUED | OUTPATIENT
Start: 2022-06-09 | End: 2022-06-12 | Stop reason: HOSPADM

## 2022-06-09 RX ORDER — PREDNISONE 20 MG/1
40 TABLET ORAL DAILY
Status: DISCONTINUED | OUTPATIENT
Start: 2022-06-09 | End: 2022-06-12 | Stop reason: HOSPADM

## 2022-06-09 RX ORDER — ACETAMINOPHEN 650 MG/1
650 SUPPOSITORY RECTAL EVERY 6 HOURS PRN
Status: DISCONTINUED | OUTPATIENT
Start: 2022-06-09 | End: 2022-06-12 | Stop reason: HOSPADM

## 2022-06-09 RX ORDER — ENOXAPARIN SODIUM 100 MG/ML
40 INJECTION SUBCUTANEOUS DAILY
Status: DISCONTINUED | OUTPATIENT
Start: 2022-06-09 | End: 2022-06-12 | Stop reason: HOSPADM

## 2022-06-09 RX ORDER — IPRATROPIUM BROMIDE AND ALBUTEROL SULFATE 2.5; .5 MG/3ML; MG/3ML
1 SOLUTION RESPIRATORY (INHALATION)
Status: DISCONTINUED | OUTPATIENT
Start: 2022-06-09 | End: 2022-06-09

## 2022-06-09 RX ADMIN — LEVALBUTEROL HYDROCHLORIDE 1.25 MG: 1.25 SOLUTION RESPIRATORY (INHALATION) at 23:39

## 2022-06-09 RX ADMIN — ALBUTEROL SULFATE 2.5 MG: 2.5 SOLUTION RESPIRATORY (INHALATION) at 06:53

## 2022-06-09 RX ADMIN — AZITHROMYCIN MONOHYDRATE 500 MG: 500 INJECTION, POWDER, LYOPHILIZED, FOR SOLUTION INTRAVENOUS at 11:58

## 2022-06-09 RX ADMIN — METHIMAZOLE 10 MG: 5 TABLET ORAL at 11:56

## 2022-06-09 RX ADMIN — METOPROLOL SUCCINATE 100 MG: 100 TABLET, EXTENDED RELEASE ORAL at 11:57

## 2022-06-09 RX ADMIN — IPRATROPIUM BROMIDE AND ALBUTEROL SULFATE 1 AMPULE: .5; 3 SOLUTION RESPIRATORY (INHALATION) at 12:46

## 2022-06-09 RX ADMIN — ENOXAPARIN SODIUM 40 MG: 100 INJECTION SUBCUTANEOUS at 11:56

## 2022-06-09 RX ADMIN — METHYLPREDNISOLONE SODIUM SUCCINATE 125 MG: 125 INJECTION, POWDER, FOR SOLUTION INTRAMUSCULAR; INTRAVENOUS at 06:01

## 2022-06-09 RX ADMIN — ATORVASTATIN CALCIUM 80 MG: 80 TABLET, FILM COATED ORAL at 21:50

## 2022-06-09 RX ADMIN — CLOPIDOGREL BISULFATE 75 MG: 75 TABLET ORAL at 11:57

## 2022-06-09 RX ADMIN — PREDNISONE 40 MG: 20 TABLET ORAL at 11:57

## 2022-06-09 RX ADMIN — PANTOPRAZOLE SODIUM 40 MG: 40 TABLET, DELAYED RELEASE ORAL at 21:24

## 2022-06-09 RX ADMIN — ALBUTEROL SULFATE 2.5 MG: 2.5 SOLUTION RESPIRATORY (INHALATION) at 17:08

## 2022-06-09 RX ADMIN — ESCITALOPRAM OXALATE 10 MG: 10 TABLET ORAL at 11:57

## 2022-06-09 RX ADMIN — ACETAMINOPHEN 650 MG: 325 TABLET ORAL at 07:45

## 2022-06-09 RX ADMIN — TIOTROPIUM BROMIDE INHALATION SPRAY 2 PUFF: 3.12 SPRAY, METERED RESPIRATORY (INHALATION) at 12:49

## 2022-06-09 RX ADMIN — SODIUM CHLORIDE, PRESERVATIVE FREE 10 ML: 5 INJECTION INTRAVENOUS at 23:21

## 2022-06-09 RX ADMIN — PANTOPRAZOLE SODIUM 40 MG: 40 TABLET, DELAYED RELEASE ORAL at 12:03

## 2022-06-09 RX ADMIN — SODIUM CHLORIDE, PRESERVATIVE FREE 10 ML: 5 INJECTION INTRAVENOUS at 11:58

## 2022-06-09 RX ADMIN — EZETIMIBE 10 MG: 10 TABLET ORAL at 11:56

## 2022-06-09 RX ADMIN — IPRATROPIUM BROMIDE AND ALBUTEROL SULFATE 1 AMPULE: .5; 3 SOLUTION RESPIRATORY (INHALATION) at 06:01

## 2022-06-09 RX ADMIN — ASPIRIN 81 MG: 81 TABLET, CHEWABLE ORAL at 11:57

## 2022-06-09 ASSESSMENT — ENCOUNTER SYMPTOMS
SHORTNESS OF BREATH: 1
COUGH: 0
WHEEZING: 1
SORE THROAT: 0
VOMITING: 0
SPUTUM PRODUCTION: 0
SWOLLEN GLANDS: 0
ABDOMINAL PAIN: 0
HEMOPTYSIS: 0

## 2022-06-09 ASSESSMENT — PAIN SCALES - GENERAL
PAINLEVEL_OUTOF10: 0
PAINLEVEL_OUTOF10: 4
PAINLEVEL_OUTOF10: 1
PAINLEVEL_OUTOF10: 1

## 2022-06-09 ASSESSMENT — PAIN DESCRIPTION - DESCRIPTORS: DESCRIPTORS: ACHING

## 2022-06-09 ASSESSMENT — PAIN DESCRIPTION - LOCATION: LOCATION: HEAD

## 2022-06-09 ASSESSMENT — PAIN DESCRIPTION - ORIENTATION: ORIENTATION: MID

## 2022-06-09 ASSESSMENT — PAIN - FUNCTIONAL ASSESSMENT
PAIN_FUNCTIONAL_ASSESSMENT: NONE - DENIES PAIN
PAIN_FUNCTIONAL_ASSESSMENT: ACTIVITIES ARE NOT PREVENTED

## 2022-06-09 NOTE — PROGRESS NOTES
TRANSFER - OUT REPORT:    Verbal report given to [facility] RN  on Eliezer Castañeda  being transferred to [id number] for routine progression of patient care       Report consisted of patient's Situation, Background, Assessment and   Recommendations(SBAR). Information from the following report(s) ED SBAR was reviewed with the receiving nurse. Lines:   Peripheral IV 06/09/22 Right Antecubital (Active)   Site Assessment Clean, dry & intact 06/09/22 0601   Line Status Blood return noted;Normal saline locked 06/09/22 0601   Line Care Cap changed 06/09/22 0601   Phlebitis Assessment No symptoms 06/09/22 0601   Infiltration Assessment 0 06/09/22 0601   Dressing Status Clean, dry & intact 06/09/22 0601   Dressing Type Transparent 06/09/22 0601        Opportunity for questions and clarification was provided.       Patient transported with:  [employer]

## 2022-06-09 NOTE — PROGRESS NOTES
CM met with patient to discuss d/c plan and needs. Patient alert/orient to place, self, person and situation. Patient verified demographic, no changes. Patient verified PCP and insurance. Patient states she lives with her significant other in a one level home with one step to enter into the home. Patient states she's independent with her ADL's and do has DME's in the home. Patient states she's on 1 liter of home 02 continuous, states the provider for her home 02 is Thiago Peña.  Patient states her plan is to return home and has denied any services that may be offered at this time. Not interested in [facility] / rehab. CM will continue to monitor for any other needs or recommendations.         ASSESSMENT NOTE    Attending Physician: Malu Waston MD  Admit Problem: COPD exacerbation [facility]) [J44.1]  Acute on chronic respiratory failure ([address] [J96.20]  Date/Time of Admission: 6/9/2022  4:39 AM  Problem List:  Patient Active Problem List   Diagnosis    Essential hypertension, benign    Ventricular ectopy    Iron deficiency anemia due to chronic blood loss    Carotid stenosis, right    Carotid stenosis    Right ear pain    Pulmonary mass    Chronic anxiety    Coronary artery disease involving native coronary artery of native heart without angina pectoris    Chronic respiratory failure with hypoxia ([address]    Personal history of tobacco use    COPD, very severe ([address]    History of MI (myocardial infarction)    H/O carotid endarterectomy    DNR (do not resuscitate)    Acute bilateral low back pain without sciatica    Centrilobular emphysema ([address]    CAD S/P percutaneous coronary angioplasty    Palliative care patient    Ischemic heart disease    Hypertensive urgency    Hyperlipidemia    Hypoxemia    Moderate to severe mitral regurgitation    Hypokalemic alkalosis    Depression    DDD (degenerative disc disease), lumbar    Anxiety    Acute on chronic respiratory failure ([address]       Service Assessment  Patient Orientation (P) Alert and Oriented,Person,Place,Situation,Self   Cognition (P) Alert   History Provided By (P) Patient   Primary Caregiver (P) Self   Accompanied By/Relationship     Support Systems (P) Spouse/Significant Other   Patient's Healthcare Decision Maker is:     PCP Verified by CM (P) Yes   Last Visit to PCP (P) Within last 3 months   Prior Functional Level (P) Independent in ADLs/IADLs   Current Functional Level (P) Independent in ADLs/IADLs   Can patient return to prior living arrangement (P) Yes   Ability to make needs known: (P) Good   Family able to assist with home care needs: (P) Yes   Would you like for me to discuss the discharge plan with any other family members/significant others, and if so, who? (P) Yes   Financial Resources     Community Resources     CM/SW Referral       Social/Functional History  Lives With (P) Significant other   Type of Home (P) [address] (P) One level   Home Access (P) Stairs to enter without rails   Entrance Stairs - Number of Steps (P) 1   Entrance Stairs - Rails (P) None   Bathroom Shower/Tub (P) Walk-in shower   Bathroom Toilet     Bathroom Equipment (P) Shower chair   Bathroom Accessibility (P) [address] (P) [place] [address] Help From     ADL Assistance (P) Independent   Bath     Dressing     Grooming     Feeding     Toileting     Homemaking Assistance (P) [address]     Driving     Shopping          Other (Comment)     Homemaking Responsibilities (P) Yes   Meal Prep Responsibility (P) Primary   Laundry Responsibility (P) Primary   Cleaning Responsibility (P) Primary   Bill Paying/Finance Responsibility (P) Primary   Shopping Responsibility (P) [address] (P) [address] Management (P) Primary   Other (Comment)     Ambuation Assistance (P) Independent   Transfer Assisstance (P) Independent   Active  (P) Yes   Patient's  Info     Mode of Transportation (P) Car   Education     Occupation (P) Retired   Type of Occupation       Discharge Planning   Type of Residence (P) House   Living Arrangements (P) Spouse/Significant Other   [place] (P) Spouse/Significant Other   Current Services Prior To Admission (P) Oxygen Therapy (patient on continious home 02 at 1 liter)   Potential Assistance Needed (P) N/A   DME     DME     DME Ordered? (P) No   Potential Assistance Purchasing Medications (P) No   Meds-to-Beds: Does the patient want to have any new prescriptions delivered to bedside prior to discharge? Type of Home Care Services     Patient expects to be discharged to: (P) House   Follow Up Appointment: Best Day/Time     One/Two Story Residence: (P) One story   # of Interior Steps     Height of Each Step (in)     [employer] Available     History of Falls? (P) No     Services At/After Discharge  Transition of Care Consult (CM Consult): Internal Home Health     Internal Hospice     Reason Outside Agency [address]     Partner SNF     Reason Why Partner SNF Not Chosen     Internal Comfort Care     Reason Outside [address] Discharge     [address] Provided? (P) No   Mode of Transport at Discharge (P) Other (see comment)   Hospital Transport Time of Discharge     Confirm Follow Up Transport (P) Family     Condition of Participation: Discharge Planning  The plan for Transition of Care is related to the following treatment goals: The Patient and/or Patient Representative was provided with a Choice of Provider? (P) Patient   Name of the Patient Representative who was provided with the Choice of Provider and agrees with the Discharge Plan? The Patient and/or Patient Representative Agree with the Discharge Plan?      Freedom of Choice list was provided with basic dialogue that supports the individualized plan of care/goals, treatment preferences, and shares the quality data associated with the providers?        Documentation for Discharge Appeal  Discharge Appealed by     Date notified by [facility] of appeal request:     Time notified by [facility] of appeal request:     Detailed Notice of Discharge given to:     Date Notice of Discharge given:     Time Notice of Discharge given:     Date records sent to [facility]     Time records sent to Lonny Tillman     Date Notified of Outcome     Time Notified of Outcome     Outcome of appeal           BERTA Coombs 06/09/22 4:03 PM

## 2022-06-09 NOTE — ED PROVIDER NOTES
[facility] Emergency Department Provider Note                   PCP:                Addison Rodriges MD               Age: 66 y.o. Sex: female       ICD-10-CM    1. COPD exacerbation ([facility] [address]  J44.1        DISPOSITION Decision To Admit 06/09/2022 07:43:23 AM       New Prescriptions    No medications on file       Orders Placed This Encounter   Procedures    XR CHEST PORTABLE    CBC with Auto Differential    Comprehensive Metabolic Panel    Insert peripheral IV        MDM  Number of Diagnoses or Management Options  COPD exacerbation ([facility] [address]): new, needed workup     Amount and/or Complexity of Data Reviewed  Clinical lab tests: ordered and reviewed  Tests in the radiology section of CPT®: ordered and reviewed  Tests in the medicine section of CPT®: ordered and reviewed  Review and summarize past medical records: yes    Risk of Complications, Morbidity, and/or Mortality  Presenting problems: moderate  Diagnostic procedures: moderate  Management options: moderate    Patient Progress  Patient progress: improved       Antony Alicea is a 66 y.o. female who presents to the Emergency Department with chief complaint of    Chief Complaint   Patient presents with    Shortness of Breath      65-year-old  female with history of emphysema, O2 dependent 1 L at home, presents to the emergency department complaining of worsening shortness of breath over the last 2 days. Patient denies any fever or chills, nausea or vomiting. No help with home nebulizers. The history is provided by the patient. Shortness of Breath  Severity:  Moderate  Onset quality:  Gradual  Duration:  2 days  Timing:  Constant  Progression:  Worsening  Chronicity:  Recurrent  Relieved by:  Nothing  Worsened by:   Activity  Ineffective treatments:  Oxygen (Home nebulizer treatments)  Associated symptoms: wheezing    Associated symptoms: no abdominal pain, no chest pain, no cough, no diaphoresis, no ear pain, no fever, no headaches, no hemoptysis, no neck pain, no PND, no rash, no sore throat, no sputum production, no syncope, no swollen glands and no vomiting        All other systems reviewed and are negative. Review of Systems   Constitutional: Negative for diaphoresis and fever. HENT: Negative for ear pain and sore throat. Respiratory: Positive for shortness of breath and wheezing. Negative for cough, hemoptysis and sputum production. Cardiovascular: Negative for chest pain, syncope and PND. Gastrointestinal: Negative for abdominal pain and vomiting. Musculoskeletal: Negative for neck pain. Skin: Negative for rash. Neurological: Negative for headaches. All other systems reviewed and are negative. Past Medical History:   Diagnosis Date    Asthma     CAD (coronary artery disease)         History reviewed. No pertinent surgical history. History reviewed. No pertinent family history. Social Connections:     Frequency of Communication with Friends and Family: Not on file    Frequency of Social Gatherings with Friends and Family: Not on file    Attends Zoroastrian Services: Not on file    Active Member of Clubs or Organizations: Not on file    Attends Club or Organization Meetings: Not on file    Marital Status: Not on file        Allergies   Allergen Reactions    Promethazine Nausea And Vomiting and Other (See Comments)     Severe vomiting         Vitals signs and nursing note reviewed. Patient Vitals for the past 4 hrs:   Temp Pulse Resp BP SpO2   06/09/22 0659 -- 97 20 (!) 151/69 92 %   06/09/22 0447 99.4 °F (37.4 °C) 98 22 (!) 169/82 92 %          Physical Exam  Vitals and nursing note reviewed. Constitutional:       General: She is in acute distress. HENT:      Head: Normocephalic and atraumatic. Nose: Nose normal.      Mouth/Throat:      Mouth: Mucous membranes are moist.   Eyes:      Extraocular Movements: Extraocular movements intact.       Conjunctiva/sclera: Conjunctivae normal. Pupils: Pupils are equal, round, and reactive to light. Cardiovascular:      Rate and Rhythm: Normal rate and regular rhythm. Heart sounds: No murmur heard. Pulmonary:      Effort: Tachypnea and accessory muscle usage present. Breath sounds: Wheezing (All fields) present. No rhonchi or rales. Comments: Diminished airflow throughout  Abdominal:      General: Abdomen is flat. Palpations: There is no mass. Tenderness: There is no abdominal tenderness. There is no right CVA tenderness or left CVA tenderness. Musculoskeletal:         General: Normal range of motion. Cervical back: Normal range of motion and neck supple. Skin:     General: Skin is warm and dry. Neurological:      General: No focal deficit present. Mental Status: She is alert and oriented to person, place, and time. Psychiatric:         Mood and Affect: Mood and affect normal.         Speech: Speech normal.          Procedures    The patient was observed in the ED. after steroids and breathing treatments, patient's sats dropped to 85% within a few feet of walking with 1 L per nasal cannula. Patient remains very dyspneic, with respiratory rates in the mid 20s to low 30s. Case discussed with hospitalist who will admit.     Results Reviewed:      Recent Results (from the past 24 hour(s))   CBC with Auto Differential    Collection Time: 06/09/22  6:00 AM   Result Value Ref Range    WBC 7.0 4.3 - 11.1 K/uL    RBC 3.85 (L) 4.05 - 5.2 M/uL    Hemoglobin 11.1 (L) 11.7 - 15.4 g/dL    Hematocrit 34.7 (L) 35.8 - 46.3 %    MCV 90.1 79.6 - 97.8 FL    MCH 28.8 26.1 - 32.9 PG    MCHC 32.0 31.4 - 35.0 g/dL    RDW 14.4 11.9 - 14.6 %    Platelets 636 421 - 748 K/uL    MPV 9.7 9.4 - 12.3 FL    nRBC 0.00 0.0 - 0.2 K/uL    Differential Type AUTOMATED      Seg Neutrophils 68 43 - 78 %    Lymphocytes 20 13 - 44 %    Monocytes 11 4.0 - 12.0 %    Eosinophils % 0 (L) 0.5 - 7.8 %    Basophils 1 0.0 - 2.0 %    Immature Granulocytes 0 0.0 - 5.0 %    Segs Absolute 4.8 1.7 - 8.2 K/UL    Absolute Lymph # 1.4 0.5 - 4.6 K/UL    Absolute Mono # 0.8 0.1 - 1.3 K/UL    Absolute Eos # 0.0 0.0 - 0.8 K/UL    Basophils Absolute 0.1 0.0 - 0.2 K/UL    Absolute Immature Granulocyte 0.0 0.0 - 0.5 K/UL   Comprehensive Metabolic Panel    Collection Time: 06/09/22  6:00 AM   Result Value Ref Range    Sodium 136 136 - 145 mmol/L    Potassium 3.5 3.5 - 5.1 mmol/L    Chloride 103 98 - 107 mmol/L    CO2 26 21 - 32 mmol/L    Anion Gap 7 7 - 16 mmol/L    Glucose 80 65 - 100 mg/dL    BUN 10 8 - 23 MG/DL    CREATININE 0.80 0.6 - 1.0 MG/DL    GFR African American >60 >60 ml/min/1.73m2    GFR Non- >60 >60 ml/min/1.73m2    Calcium 8.9 8.3 - 10.4 MG/DL    Total Bilirubin 0.5 0.2 - 1.1 MG/DL    ALT 15 12 - 65 U/L    AST 18 15 - 37 U/L    Alk Phosphatase 78 50 - 136 U/L    Total Protein 7.5 6.3 - 8.2 g/dL    Albumin 3.1 (L) 3.2 - 4.6 g/dL    Globulin 4.4 (H) 2.3 - 3.5 g/dL    Albumin/Globulin Ratio 0.7 (L) 1.2 - 3.5       XR CHEST PORTABLE   Final Result   1. Emphysema. 2. Coronary artery disease. 3. Unchanged nodular consolidation in the left lower lobe. Voice dictation software was used during the making of this note. This software is not perfect and grammatical and other typographical errors may be present. This note has not been completely proofread for errors.      Linda Ellison MD  06/09/22 0212       Linda Ellison MD  06/09/22 6151

## 2022-06-09 NOTE — PROGRESS NOTES
PHYSICAL THERAPY Initial Assessment, Daily Note and PM  (Link to Caseload Tracking: PT Visit Days : 1  Acknowledge Orders  Time In/Out  PT Charge Capture  Rehab Caseload Tracker    Martinez Arrington is a 66 y.o. female   PRIMARY DIAGNOSIS: Acute on chronic respiratory failure (HCC)  COPD exacerbation (HCC) [J44.1]  Acute on chronic respiratory failure ([facility] [address] [J96.20]       Reason for Referral: Generalized Muscle Weakness (M62.81)  Difficulty in walking, Not elsewhere classified (R26.2)  Other abnormalities of gait and mobility (R26.89)  Inpatient: Payor: MEDICARE / Plan: MEDICARE PART A AND B / Product Type: *No Product type* /     ASSESSMENT:     REHAB RECOMMENDATIONS:   Recommendation to date pending progress:  Setting:   Outpatient Therapy    Equipment:     To Be Determined     ASSESSMENT:  Ms. Jason Porras Is a 66 y.o. female presenting to PT after coming to ED for acute on chronic respiratory failure. PTA pt lives c her significant other in a single-level home where she is functionally (I) for all needs without use of an AD. At time of initial evaluation, pt presents slightly below baseline LOF with deficits in standing dynamic balance, gait and activity tolerance limiting her overall functional mobility. Today, pt performed all mobility with (S)/ SB (A) including ambulation of 60'x1. Of note, pt's gait shows dec german and inc postural sway but she does well to remain steady without any LOB, miss-steps or physical (A) required. Pt performed all activity on 2L NC and her SpO2 remained >92% for duration of session. At this time, pt is an appropriate candidate for skilled PT and will benefit from POC designed to address the aforementioned deficits. Upon completion of treatment, pt was positioned to comfort in chair with needs in reach. RN was made aware of pt performance.      DC Recommendation: Continue OP PT     Katelyn Chandler AM-PAC 6 [facility] Basic Mobility Inpatient Short Form  AM-PAC Mobility Inpatient   How much difficulty turning over in bed?: None  How much difficulty sitting down on / standing up from a chair with arms?: None  How much difficulty moving from lying on back to sitting on side of bed?: None  How much help from another person moving to and from a bed to a chair?: None  How much help from another person needed to walk in hospital room?: A Little  How much help from another person for climbing 3-5 steps with a railing?: A Little  AM-PAC Inpatient Mobility Raw Score : 22  AM-PAC Inpatient T-Scale Score : 53.28  Mobility Inpatient CMS 0-100% Score: 20.91  Mobility Inpatient CMS G-Code Modifier : CJ    SUBJECTIVE:   Ms. Bob Mina states, \"I go to therapy at [facility]\"     Social/Functional Lives With: Significant other  Type of Home: House  Home Layout: One level  Home Access: Stairs to enter without rails  Entrance Stairs - Number of Steps: 1  Entrance Stairs - Rails: None  Bathroom Shower/Tub: Walk-in shower  Bathroom Equipment: Shower chair  Bathroom Accessibility: Accessible  Home Equipment: Walker, rolling  ADL Assistance: Independent  Homemaking Assistance: Independent  Homemaking Responsibilities: Yes  Meal Prep Responsibility: Primary  Laundry Responsibility: Primary  Cleaning Responsibility: Primary  Bill Paying/Finance Responsibility: Primary  Shopping Responsibility: Primary  Dependent Care Responsibility: Primary  Health Care Management: Primary  Ambulation Assistance: Independent  Transfer Assistance: Independent  Active : Yes  Mode of Transportation: Car  Occupation: Retired    OBJECTIVE:     PAIN: VITALS / O2: PRECAUTION / [employer] / DRAINS:   Pre Treatment:   Pain Assessment: 0-10  Pain Level: 1      Post Treatment: 1 Vitals        Oxygen  O2 Device: Nasal cannula  O2 Flow Rate (L/min): 2 L/min  SpO2: 92 %   IV    RESTRICTIONS/PRECAUTIONS:                    GROSS EVALUATION: Intact Impaired (Comments):   AROM [x]     PROM []    Strength []  generally weak   Balance [] inc postural sway   Posture [] Forward Head   Sensation [x]  NT   Coordination [x]      Tone [x]     Edema [x]    Activity Tolerance []  below baseline level    []      COGNITION/  PERCEPTION: Intact Impaired (Comments):   Orientation [x]     Vision [x]     Hearing [x]     Cognition  [x]       MOBILITY: I Mod I S SBA CGA Min Mod Max Total  NT x2 Comments:   Bed Mobility    Rolling [] [] [] [] [] [] [] [] [] [x] []    Supine to Sit [] [] [] [] [] [] [] [] [] [x] []    Scooting [] [] [] [] [] [] [] [] [] [x] []    Sit to Supine [] [] [] [] [] [] [] [] [] [x] []    Transfers    Sit to Stand [] [] [] [x] [] [] [] [] [] [] []    Bed to Chair [] [] [] [] [] [] [] [] [] [x] []    Stand to Sit [] [] [] [x] [] [] [] [] [] [] []     [] [] [] [] [] [] [] [] [] [] []    I=Independent, Mod I=Modified Independent, S=Supervision, SBA=Standby Assistance, CGA=Contact Guard Assistance,   Min=Minimal Assistance, Mod=Moderate Assistance, Max=Maximal Assistance, Total=Total Assistance, NT=Not Tested    GAIT: I Mod I S SBA CGA Min Mod Max Total  NT x2 Comments:   Level of Assistance [] [] [] [x] [] [] [] [] [] [] []    Distance 60 feet    DME Gait Belt    Gait Quality Decreased german  and Trunk sway increased    Weightbearing Status      Stairs      I=Independent, Mod I=Modified Independent, S=Supervision, SBA=Standby Assistance, CGA=Contact Guard Assistance,   Min=Minimal Assistance, Mod=Moderate Assistance, Max=Maximal Assistance, Total=Total Assistance, NT=Not Tested    PLAN:   ACUTE PHYSICAL THERAPY GOALS:   (Developed with and agreed upon by patient and/or caregiver.)  1. Pt will ambulate 500 ft SB (A) with use of LRAD and breaks as needed in 7 therapy sessions. 2. Pt will perform standing dynamic balance activities with minimal postural sway in 7 therapy sessions. 3. Pt will tolerate multiple sets and reps of BLE exercises in 7 therapy sessions.       FREQUENCY AND DURATION: 3 times/week for duration of hospital stay or until stated goals are met, whichever comes first.    THERAPY PROGNOSIS: Good    PROBLEM LIST:   (Skilled intervention is medically necessary to address:)  Decreased Activity Tolerance  Decreased Balance  Decreased Gait Ability  Decreased Strength INTERVENTIONS PLANNED:   (Benefits and precautions of physical therapy have been discussed with the patient.)  Self Care Training  Therapeutic Activity  Therapeutic Exercise/HEP  Gait Training  Education       TREATMENT:   EVALUATION: LOW COMPLEXITY: (Untimed Charge)    TREATMENT:   Therapeutic Activity (8 Minutes): Therapeutic activity included Scooting, Ambulation on level ground, Sitting balance  and Standing balance to improve functional Activity tolerance, Balance, Mobility and Strength.     TREATMENT GRID:  N/A    AFTER TREATMENT PRECAUTIONS: Call light within reach, Chair, Needs within reach and RN notified    INTERDISCIPLINARY COLLABORATION:  RN/ PCT and PT/ PTA    EDUCATION: Education Given To: Patient  Education Provided: Role of Therapy;Plan of Care    TIME IN/OUT:  Time In: 8663  Time Out: MARCELINA Merritt 23  Minutes: [place] [address], PT

## 2022-06-09 NOTE — H&P
Hospitalist History and Physical   Admit Date:  2022  4:39 AM   Name:  Odalys Angel   Age:  66 y.o. Sex:  female  :  1950   MRN:  [id number]   Room:  02/    Presenting Complaint: Shortness of Breath     Reason(s) for Admission: Acute on chronic respiratory failure ([facility] [address] [J96.20]     History of Present Illness:   Odalys Angel is a 66 y.o. female with medical history of severe COPD, hypertension, anemia who presented with worsening shortness of breath. She started using oxygen during the day about a month ago, for comfort. She noted worsening shortness of breath about 4 days prior to presentation. She has occasional cough but it has not been worse this week. She denies other symptoms including fever, chills, headache, vision changes, palpitations, nausea, vomiting, diarrhea, peripheral edema. She was admitted for further evaluation and management. Review of Systems:  10 systems reviewed and negative except as noted in HPI. Assessment & Plan:   Acute on chronic respiratory failure (HCC)  -supportive care, maintain sat > 88  -pulmonary hygiene  -consider pulmonology consult    COPD, very severe ([facility] [address]  Follows with palliative care outpatient  -azithromycin  -prednisone  -consider azithromycin ppx    Primary hypertension  -metoprolol    Iron deficiency anemia due to chronic blood loss  -transfuse < 7    Do not resuscitate  -intubation acceptable      Dispo/Discharge Planning:     Pending clinical improvement    Diet: ADULT DIET; Regular  VTE ppx: enopxaparin  Code status: No Order    Hospital Problems:  Principal Problem:    Acute on chronic respiratory failure (HCC)  Active Problems:    Essential hypertension, benign    Iron deficiency anemia due to chronic blood loss    Chronic respiratory failure with hypoxia (HCC)    COPD, very severe (HCC)    DNR (do not resuscitate)    Centrilobular emphysema ([facility] [address]  Resolved Problems:    * No resolved hospital problems.  *       Past History:     Past Medical History:   Diagnosis Date    Acute respiratory failure with hypoxia ([facility] [address] 8/22/2013    Asthma     CAD (coronary artery disease)     Chest pain 5/31/2019    Closed right hip fracture (HCC) 2/4/2022    COPD with acute lower respiratory infection ([address] 2/10/2022    EKG, abnormal 2/15/2022    Femur fracture, right ([address] 2/11/2022    Multiple closed anterior-posterior compression fractures of pelvis ([address] 2/2/2022    Other fracture of right femur, initial encounter for closed fracture ([address] 2/4/2022    Post-traumatic subdural hematoma (HCC) 3/15/2022    [facility] 3/18/2022     History reviewed. No pertinent surgical history. Allergies   Allergen Reactions    Promethazine Nausea And Vomiting and Other (See Comments)     Severe vomiting       Social History     Tobacco Use    Smoking status: Former Smoker    Smokeless tobacco: Never Used   Substance Use Topics    Alcohol use: Not on file      Family History   Adopted: Yes   Problem Relation Age of Onset    No Known Problems Mother     No Known Problems Father       Family history reviewed and negative except as noted above.       Immunization History   Administered Date(s) Administered    COVID-19, Pfizer Purple top, DILUTE for use, 12+ yrs, 30mcg/0.3mL dose 01/28/2021, 02/18/2021, 09/25/2021    Influenza Virus Vaccine 10/01/2012, 09/27/2013, 10/10/2021    Influenza, High Dose (Fluzone 65 yrs and older) 09/23/2014, 10/08/2015, 10/10/2016, 09/26/2017, 09/25/2018    Influenza, High-dose, Bruna Olivera, 65 yrs +, IM (Fluzone) 10/06/2020, 09/14/2021    Influenza, Triv, inactivated, subunit, adjuvanted, IM (Fluad 65 yrs and older) 09/20/2019    PPD Test 02/04/2022, 02/27/2022    Pneumococcal Conjugate 13-valent ([id number]) 09/23/2014    Pneumococcal Polysaccharide ([id number]) 01/01/2008, 09/23/2014, 08/30/2018    Tdap (Boostrix, Adacel) 11/07/2018    Zoster Recombinant (Shingrix) 03/27/2019, 06/18/2019 Prior to Admit Medications:  Current Outpatient Medications   Medication Instructions    acetaminophen (TYLENOL) 325 MG tablet Oral, EVERY 4 HOURS PRN    aspirin 81 mg, Oral, DAILY    atorvastatin (LIPITOR) 80 MG tablet TAKE 1 TABLET BY MOUTH DAILY    budesonide-formoterol (SYMBICORT) 160-4.5 MCG/ACT AERO 2 puffs, Inhalation, 2 TIMES DAILY    butalbital-acetaminophen-caffeine (FIORICET, ESGIC) -40 MG per tablet 1 tablet, Oral, EVERY 6 HOURS PRN    calcium carb-cholecalciferol 250-125 MG-UNIT TABS 1 tablet, Oral, DAILY    cetirizine (ZYRTEC) 10 mg, Oral, DAILY PRN    clopidogrel (PLAVIX) 75 mg, Oral, DAILY    escitalopram (LEXAPRO) 10 mg, Oral, DAILY    ezetimibe (ZETIA) 10 mg, Oral, DAILY    levalbuterol (XOPENEX) 1.25 mg, Inhalation, EVERY 4 HOURS PRN    methIMAzole (TAPAZOLE) 10 mg, Oral, DAILY    metoprolol succinate (TOPROL XL) 100 mg, Oral, DAILY    nitroGLYCERIN (NITROSTAT) 0.4 mg, SubLINGual, 3 TIMES DAILY PRN    pantoprazole (PROTONIX) 40 mg, Oral, 2 TIMES DAILY    Roflumilast (DALIRESP) 500 mcg, Oral, DAILY    tiotropium (SPIRIVA RESPIMAT) 2.5 MCG/ACT AERS inhaler 2 puffs, Inhalation, DAILY         Objective:     Patient Vitals for the past 24 hrs:   Temp Pulse Resp BP SpO2   06/09/22 0800 -- (!) 108 27 (!) 143/68 95 %   06/09/22 0745 -- (!) 109 28 (!) 140/71 94 %   06/09/22 0659 -- 97 20 (!) 151/69 92 %   06/09/22 0447 99.4 °F (37.4 °C) 98 22 (!) 169/82 92 %       Oxygen Therapy  SpO2: 95 %  O2 Device: Nasal cannula  O2 Flow Rate (L/min): 2 L/min    Estimated body mass index is 21.29 kg/m² as calculated from the following:    Height as of this encounter: 5' 8\" (1.727 m). Weight as of this encounter: 140 lb (63.5 kg). No intake or output data in the 24 hours ending 06/09/22 0835      Blood pressure (!) 143/68, pulse (!) 108, temperature 99.4 °F (37.4 °C), temperature source Oral, resp. rate 27, height 5' 8\" (1.727 m), weight 140 lb (63.5 kg), SpO2 95 %.   Physical Exam  Vitals and nursing note reviewed. Constitutional:       General: She is not in acute distress. Appearance: She is ill-appearing. She is not diaphoretic. Eyes:      Extraocular Movements: Extraocular movements intact. Cardiovascular:      Rate and Rhythm: Normal rate. Pulmonary:      Effort: Bradypnea and accessory muscle usage present. No respiratory distress. Breath sounds: Decreased breath sounds and wheezing present. No rhonchi. Abdominal:      General: There is no distension. Musculoskeletal:         General: No deformity. Skin:     Coloration: Skin is not jaundiced or pale. Neurological:      General: No focal deficit present. Mental Status: She is alert and oriented to person, place, and time. Psychiatric:         Mood and Affect: Mood is anxious.          Behavior: Behavior normal.         Cognition and Memory: Cognition and memory normal.         I have reviewed ordered lab tests and independently visualized imaging below:    Last 24hr Labs:  Recent Results (from the past 24 hour(s))   CBC with Auto Differential    Collection Time: 06/09/22  6:00 AM   Result Value Ref Range    WBC 7.0 4.3 - 11.1 K/uL    RBC 3.85 (L) 4.05 - 5.2 M/uL    Hemoglobin 11.1 (L) 11.7 - 15.4 g/dL    Hematocrit 34.7 (L) 35.8 - 46.3 %    MCV 90.1 79.6 - 97.8 FL    MCH 28.8 26.1 - 32.9 PG    MCHC 32.0 31.4 - 35.0 g/dL    RDW 14.4 11.9 - 14.6 %    Platelets 203 608 - 007 K/uL    MPV 9.7 9.4 - 12.3 FL    nRBC 0.00 0.0 - 0.2 K/uL    Differential Type AUTOMATED      Seg Neutrophils 68 43 - 78 %    Lymphocytes 20 13 - 44 %    Monocytes 11 4.0 - 12.0 %    Eosinophils % 0 (L) 0.5 - 7.8 %    Basophils 1 0.0 - 2.0 %    Immature Granulocytes 0 0.0 - 5.0 %    Segs Absolute 4.8 1.7 - 8.2 K/UL    Absolute Lymph # 1.4 0.5 - 4.6 K/UL    Absolute Mono # 0.8 0.1 - 1.3 K/UL    Absolute Eos # 0.0 0.0 - 0.8 K/UL    Basophils Absolute 0.1 0.0 - 0.2 K/UL    Absolute Immature Granulocyte 0.0 0.0 - 0.5 K/UL   Comprehensive Metabolic Panel    Collection Time: 06/09/22  6:00 AM   Result Value Ref Range    Sodium 136 136 - 145 mmol/L    Potassium 3.5 3.5 - 5.1 mmol/L    Chloride 103 98 - 107 mmol/L    CO2 26 21 - 32 mmol/L    Anion Gap 7 7 - 16 mmol/L    Glucose 80 65 - 100 mg/dL    BUN 10 8 - 23 MG/DL    CREATININE 0.80 0.6 - 1.0 MG/DL    GFR African American >60 >60 ml/min/1.73m2    GFR Non- >60 >60 ml/min/1.73m2    Calcium 8.9 8.3 - 10.4 MG/DL    Total Bilirubin 0.5 0.2 - 1.1 MG/DL    ALT 15 12 - 65 U/L    AST 18 15 - 37 U/L    Alk Phosphatase 78 50 - 136 U/L    Total Protein 7.5 6.3 - 8.2 g/dL    Albumin 3.1 (L) 3.2 - 4.6 g/dL    Globulin 4.4 (H) 2.3 - 3.5 g/dL    Albumin/Globulin Ratio 0.7 (L) 1.2 - 3.5         Other Studies:  XR CHEST PORTABLE    Result Date: 6/9/2022  EXAM: Chest x-ray. INDICATION: Dyspnea. COMPARISON: Prior CT chest on February 26, 2022. TECHNIQUE: 2 frontal views of the chest were obtained. FINDINGS: The lungs are hyperexpanded, consistent with emphysematous changes on the prior CT chest. Areas of nodular consolidation in the left lower lobe are unchanged. No acute infiltrate or pulmonary edema is seen. The heart size, mediastinal contour and pulmonary vasculature are normal. There are coronary artery calcifications or stents. The thorax or pleural effusion is seen. 1. Emphysema. 2. Coronary artery disease. 3. Unchanged nodular consolidation in the left lower lobe. Echocardiogram:  No results found for this or any previous visit. Meds previously ordered:  Orders Placed This Encounter   Medications    ipratropium-albuterol (DUONEB) nebulizer solution 1 ampule     Order Specific Question:   Initiate RT Bronchodilator Protocol     Answer: Yes    methylPREDNISolone sodium (SOLU-MEDROL) injection 125 mg    albuterol (PROVENTIL) nebulizer solution 2.5 mg     Order Specific Question:   Initiate RT Bronchodilator Protocol     Answer:    Yes    acetaminophen (TYLENOL) tablet 650 mg Signed:  Mahad Terry MD

## 2022-06-09 NOTE — ED TRIAGE NOTES
Pt presents to the ED from home with c/o SOB x2 days. H/o Stage 4 Emphysema and had a breathing treatment once before EMS arrived. Pt denies chest pain, N/V/D, cough, fever, chills, and any other complaints.        Pt on 1 L of home oxygen via NC.

## 2022-06-09 NOTE — PROGRESS NOTES
OCCUPATIONAL THERAPY Initial Assessment and Daily Note       OT Visit Days: 1  Acknowledge Orders  Time  OT Charge Capture  Rehab Caseload Tracker      Priscila Lee is a 66 y.o. female   PRIMARY DIAGNOSIS: Acute on chronic respiratory failure ([facility] [address]  COPD exacerbation ([facility] [address] [J44.1]  Acute on chronic respiratory failure ([facility] [address] [J96.20]       Reason for Referral: Generalized Muscle Weakness (M62.81)  Inpatient: Payor: MEDICARE / Plan: MEDICARE PART A AND B / Product Type: *No Product type* /     ASSESSMENT:     REHAB RECOMMENDATIONS:   Recommendation to date pending progress:  Setting:   No further skilled therapy after discharge from hospital    Equipment:     None     ASSESSMENT:  Ms. Dorene Butcher was admitted with acute on chronic respiratory failure, COPD exacerbation. Pt presented generally weak with increased O2 needs and deficits in endurance, strength, mobility, and balance impacting ADLs. Pt demonstrated ADLs and mobility for ADLs with CGA w/o an AD. SpO2 remained 93 and above on 3L NC. Pt is below her functional baseline and would benefit from skilled OT services to address deficits. [address] [facility] [address] [id number] [facility] 6 Clicks Daily Activity Inpatient Short Form:    AM-PAC Daily Activity Inpatient   How much help for putting on and taking off regular lower body clothing?: A Little  How much help for Bathing?: A Little  How much help for Toileting?: A Little  How much help for putting on and taking off regular upper body clothing?: None  How much help for taking care of personal grooming?: None  How much help for eating meals?: None  AM-[facility] Inpatient Daily Activity Raw Score: 21  AM-PAC Inpatient ADL T-Scale Score : 44.27  ADL Inpatient CMS 0-100% Score: 32.79  ADL Inpatient CMS G-Code Modifier : CJ           SUBJECTIVE:     Ms. Dorene Butcher states, \"I've been here a few times. \"     Social/Functional Lives With: Significant other  Type of Home: House  Home Layout: One level  Bathroom Shower/Tub: Walk-in shower  Bathroom Equipment: Shower chair   Independent, drives, no AD for mobility.  Goes to OP PT 2xs/ week    OBJECTIVE:     Suman Liner / Hans Gaines / [employer]: IV    RESTRICTIONS/PRECAUTIONS:       PAIN: VITALS / O2:   Pre Treatment:   Pain Assessment: None - Denies Pain      Post Treatment: 0       Vitals          Oxygen            GROSS EVALUATION: INTACT IMPAIRED   (See Comments)   UE AROM [x] []   UE PROM [] []   Strength []   grossly decreased   Posture / Balance []  slightly decreased   Sensation []     Coordination [x]       Tone []       Edema []    Activity Tolerance [] Patient limited by fatigue     Hand Dominance R [] L []      COGNITION/  PERCEPTION: INTACT IMPAIRED   (See Comments)   Orientation [x]     Vision []  glasses   Hearing [x]     Cognition  [x]     Perception [x]       MOBILITY: I Mod I S SBA CGA Min Mod Max Total  NT x2 Comments:   Bed Mobility    Rolling [] [] [] [] [] [] [] [] [] [] []    Supine to Sit [] [] [] [] [] [] [] [] [] [] []    Scooting [] [] [] [] [] [] [] [] [] [] []    Sit to Supine [] [] [] [] [] [] [] [] [] [] []    Transfers    Sit to Stand [] [] [] [] [x] [] [] [] [] [] []    Bed to Chair [] [] [] [] [x] [] [] [] [] [] []    Stand to Sit [] [] [] [] [x] [] [] [] [] [] []    Tub/Shower [] [] [] [] [] [] [] [] [] [] []     Toilet [] [] [] [] [x] [] [] [] [] [] []      [] [] [] [] [] [] [] [] [] [] []    I=Independent, Mod I=Modified Independent, S=Supervision/Setup, SBA=Standby Assistance, CGA=Contact Guard Assistance, Min=Minimal Assistance, Mod=Moderate Assistance, Max=Maximal Assistance, Total=Total Assistance, NT=Not Tested    ACTIVITIES OF DAILY LIVING: I Mod I S SBA CGA Min Mod Max Total NT Comments   BASIC ADLs:              Upper Body Bathing  [] [] [] [] [] [] [] [] [] []    Lower Body Bathing [] [] [] [] [] [] [] [] [] []    Toileting [] [] [] [] [x] [] [] [] [] []    Upper Body Dressing [] [] [] [] [] [] [] [] [] []    Lower Body Dressing [] [] [] [] [] [] [] [] [] []    Feeding [] [] [] [] [] [] [] [] [] []    Grooming [] [] [] [] [x] [] [] [] [] []    Personal Device Care [] [] [] [] [] [] [] [] [] []    Functional Mobility [] [] [] [] [x] [] [] [] [] []    I=Independent, Mod I=Modified Independent, S=Supervision/Setup, SBA=Standby Assistance, CGA=Contact Guard Assistance, Min=Minimal Assistance, Mod=Moderate Assistance, Max=Maximal Assistance, Total=Total Assistance, NT=Not Tested    PLAN:     FREQUENCY/DURATION   OT Plan of Care: 3 times/week for duration of hospital stay or until stated goals are met, whichever comes first.    ACUTE OCCUPATIONAL THERAPY GOALS:   (Developed with and agreed upon by patient and/or caregiver.)  1. Pt will toilet with SBA   2. Pt will complete functional mobility for ADLs with SBA  3. Pt will complete lower body dressing with SBA using AE as needed  4. Pt will complete grooming and hygiene at sink with SBA  5. Pt will demonstrate independence with HEP to promote increased BUE strength and functional use for ADLs  6. Pt will tolerate 23 minutes functional activity with min or fewer rest breaks to promote increased endurance for ADLs  7. Pt will independently demonstrate/ verbalize 2+ energy conservation techniques to promote increased endurance for ADLs      Timeframe: 7 days        PROBLEM LIST:   (Skilled intervention is medically necessary to address:)  Decreased ADL/Functional Activities  Decreased Activity Tolerance  Decreased Balance  Decreased Strength  Decreased Transfer Abilities   INTERVENTIONS PLANNED:  (Benefits and precautions of occupational therapy have been discussed with the patient.)  Self Care Training  Therapeutic Activity  Therapeutic Exercise/HEP  Neuromuscular Re-education  Manual Therapy  Education         TREATMENT:     EVALUATION: LOW COMPLEXITY: (Untimed Charge)    TREATMENT:   Self Care: (10): Procedure(s) (per grid) utilized to improve and/or restore self-care/home management as related to toileting and grooming. Required no   cueing to facilitate activities of daily living skills.     TREATMENT GRID:  N/A    AFTER TREATMENT PRECAUTIONS: Bed/Chair Locked, Call light within reach, Chair, Needs within reach and RN notified    INTERDISCIPLINARY COLLABORATION:  RN/ PCT    EDUCATION:  Education Given To: Patient  Education Provided: Role of Therapy;Plan of Care;Energy Conservation    TOTAL TREATMENT DURATION AND TIME:  Time In: [address]  Time Out: [address]  Minutes: [address], OT

## 2022-06-10 ENCOUNTER — APPOINTMENT (OUTPATIENT)
Dept: CT IMAGING | Age: 72
DRG: 190 | End: 2022-06-10
Payer: MEDICARE

## 2022-06-10 ENCOUNTER — HOSPITAL ENCOUNTER (OUTPATIENT)
Dept: PHYSICAL THERAPY | Age: 72
Setting detail: RECURRING SERIES
End: 2022-06-10
Payer: MEDICARE

## 2022-06-10 LAB
ANION GAP SERPL CALC-SCNC: 9 MMOL/L (ref 7–16)
BUN SERPL-MCNC: 16 MG/DL (ref 8–23)
CALCIUM SERPL-MCNC: 9.5 MG/DL (ref 8.3–10.4)
CHLORIDE SERPL-SCNC: 106 MMOL/L (ref 98–107)
CO2 SERPL-SCNC: 25 MMOL/L (ref 21–32)
CREAT SERPL-MCNC: 0.8 MG/DL (ref 0.6–1)
EKG ATRIAL RATE: 85 BPM
EKG DIAGNOSIS: NORMAL
EKG P AXIS: 79 DEGREES
EKG P-R INTERVAL: 150 MS
EKG Q-T INTERVAL: 378 MS
EKG QRS DURATION: 78 MS
EKG QTC CALCULATION (BAZETT): 449 MS
EKG R AXIS: 59 DEGREES
EKG T AXIS: 84 DEGREES
EKG VENTRICULAR RATE: 85 BPM
GLUCOSE SERPL-MCNC: 158 MG/DL (ref 65–100)
POTASSIUM SERPL-SCNC: 4.3 MMOL/L (ref 3.5–5.1)
SODIUM SERPL-SCNC: 140 MMOL/L (ref 136–145)
TROPONIN I SERPL HS-MCNC: 14.1 PG/ML (ref 0–14)
TROPONIN I SERPL HS-MCNC: 16 PG/ML (ref 0–14)

## 2022-06-10 PROCEDURE — 2580000003 HC RX 258: Performed by: FAMILY MEDICINE

## 2022-06-10 PROCEDURE — 6370000000 HC RX 637 (ALT 250 FOR IP): Performed by: HOSPITALIST

## 2022-06-10 PROCEDURE — 97530 THERAPEUTIC ACTIVITIES: CPT

## 2022-06-10 PROCEDURE — 94760 N-INVAS EAR/PLS OXIMETRY 1: CPT

## 2022-06-10 PROCEDURE — 80048 BASIC METABOLIC PNL TOTAL CA: CPT

## 2022-06-10 PROCEDURE — 6370000000 HC RX 637 (ALT 250 FOR IP): Performed by: INTERNAL MEDICINE

## 2022-06-10 PROCEDURE — 93005 ELECTROCARDIOGRAM TRACING: CPT | Performed by: INTERNAL MEDICINE

## 2022-06-10 PROCEDURE — 6370000000 HC RX 637 (ALT 250 FOR IP): Performed by: FAMILY MEDICINE

## 2022-06-10 PROCEDURE — 2580000003 HC RX 258: Performed by: INTERNAL MEDICINE

## 2022-06-10 PROCEDURE — 2700000000 HC OXYGEN THERAPY PER DAY

## 2022-06-10 PROCEDURE — 6360000002 HC RX W HCPCS: Performed by: FAMILY MEDICINE

## 2022-06-10 PROCEDURE — 84484 ASSAY OF TROPONIN QUANT: CPT

## 2022-06-10 PROCEDURE — 36415 COLL VENOUS BLD VENIPUNCTURE: CPT

## 2022-06-10 PROCEDURE — 94640 AIRWAY INHALATION TREATMENT: CPT

## 2022-06-10 PROCEDURE — 1100000000 HC RM PRIVATE

## 2022-06-10 RX ORDER — LORAZEPAM 1 MG/1
1 TABLET ORAL ONCE
Status: COMPLETED | OUTPATIENT
Start: 2022-06-10 | End: 2022-06-10

## 2022-06-10 RX ORDER — GUAIFENESIN 600 MG/1
600 TABLET, EXTENDED RELEASE ORAL 2 TIMES DAILY
Status: DISCONTINUED | OUTPATIENT
Start: 2022-06-10 | End: 2022-06-10

## 2022-06-10 RX ORDER — LEVOFLOXACIN 500 MG/1
500 TABLET, FILM COATED ORAL DAILY
Status: DISCONTINUED | OUTPATIENT
Start: 2022-06-10 | End: 2022-06-12 | Stop reason: HOSPADM

## 2022-06-10 RX ORDER — SODIUM CHLORIDE 9 MG/ML
INJECTION, SOLUTION INTRAVENOUS CONTINUOUS
Status: ACTIVE | OUTPATIENT
Start: 2022-06-10 | End: 2022-06-10

## 2022-06-10 RX ORDER — GUAIFENESIN 600 MG/1
600 TABLET, EXTENDED RELEASE ORAL 2 TIMES DAILY PRN
Status: DISCONTINUED | OUTPATIENT
Start: 2022-06-10 | End: 2022-06-12 | Stop reason: HOSPADM

## 2022-06-10 RX ADMIN — AZITHROMYCIN MONOHYDRATE 500 MG: 500 INJECTION, POWDER, LYOPHILIZED, FOR SOLUTION INTRAVENOUS at 11:28

## 2022-06-10 RX ADMIN — ALBUTEROL SULFATE 2.5 MG: 2.5 SOLUTION RESPIRATORY (INHALATION) at 09:44

## 2022-06-10 RX ADMIN — PREDNISONE 40 MG: 20 TABLET ORAL at 09:14

## 2022-06-10 RX ADMIN — ALBUTEROL SULFATE 2.5 MG: 2.5 SOLUTION RESPIRATORY (INHALATION) at 19:52

## 2022-06-10 RX ADMIN — LORAZEPAM 1 MG: 1 TABLET ORAL at 19:53

## 2022-06-10 RX ADMIN — METOPROLOL SUCCINATE 100 MG: 100 TABLET, EXTENDED RELEASE ORAL at 09:14

## 2022-06-10 RX ADMIN — PANTOPRAZOLE SODIUM 40 MG: 40 TABLET, DELAYED RELEASE ORAL at 09:14

## 2022-06-10 RX ADMIN — EZETIMIBE 10 MG: 10 TABLET ORAL at 09:14

## 2022-06-10 RX ADMIN — PANTOPRAZOLE SODIUM 40 MG: 40 TABLET, DELAYED RELEASE ORAL at 21:17

## 2022-06-10 RX ADMIN — ALBUTEROL SULFATE 2.5 MG: 2.5 SOLUTION RESPIRATORY (INHALATION) at 12:53

## 2022-06-10 RX ADMIN — TIOTROPIUM BROMIDE INHALATION SPRAY 2 PUFF: 3.12 SPRAY, METERED RESPIRATORY (INHALATION) at 09:44

## 2022-06-10 RX ADMIN — SODIUM CHLORIDE, PRESERVATIVE FREE 10 ML: 5 INJECTION INTRAVENOUS at 21:18

## 2022-06-10 RX ADMIN — LEVOFLOXACIN 500 MG: 500 TABLET, FILM COATED ORAL at 16:37

## 2022-06-10 RX ADMIN — SODIUM CHLORIDE: 9 INJECTION, SOLUTION INTRAVENOUS at 15:54

## 2022-06-10 RX ADMIN — SODIUM CHLORIDE, PRESERVATIVE FREE 10 ML: 5 INJECTION INTRAVENOUS at 09:21

## 2022-06-10 RX ADMIN — GUAIFENESIN 600 MG: 600 TABLET ORAL at 21:20

## 2022-06-10 RX ADMIN — CLOPIDOGREL BISULFATE 75 MG: 75 TABLET ORAL at 09:14

## 2022-06-10 RX ADMIN — METHIMAZOLE 10 MG: 5 TABLET ORAL at 09:14

## 2022-06-10 RX ADMIN — ALBUTEROL SULFATE 2.5 MG: 2.5 SOLUTION RESPIRATORY (INHALATION) at 16:15

## 2022-06-10 RX ADMIN — LORAZEPAM 1 MG: 1 TABLET ORAL at 04:00

## 2022-06-10 RX ADMIN — GUAIFENESIN 600 MG: 600 TABLET ORAL at 04:00

## 2022-06-10 RX ADMIN — ATORVASTATIN CALCIUM 80 MG: 80 TABLET, FILM COATED ORAL at 21:17

## 2022-06-10 RX ADMIN — ASPIRIN 81 MG: 81 TABLET, CHEWABLE ORAL at 09:14

## 2022-06-10 RX ADMIN — ESCITALOPRAM OXALATE 10 MG: 10 TABLET ORAL at 09:14

## 2022-06-10 ASSESSMENT — PAIN SCALES - GENERAL
PAINLEVEL_OUTOF10: 0
PAINLEVEL_OUTOF10: 0

## 2022-06-10 NOTE — PROGRESS NOTES
Hospitalist Progress Note   Admit Date:  2022  4:39 AM   Name:  Rayna Dumont   Age:  66 y.o. Sex:  female  :  1950   MRN:  [id number]   Room:  721/01    Presenting Complaint: Shortness of Breath     Reason(s) for Admission: [facility] exacerbation ([facility] [J44.1]  Acute on chronic respiratory failure [facility] Course & Interval History:     Rayna Dumont is a 66 y.o. female with medical history of severe COPD, hypertension, anemia who presented with worsening shortness of breath. She started using oxygen during the day about a month ago, for comfort. She noted worsening shortness of breath about 4 days prior to presentation. She has occasional cough but it has not been worse this week. She denies other symptoms including fever, chills, headache, vision changes, palpitations, nausea, vomiting, diarrhea, peripheral edema. She was admitted for further evaluation and management. Subjective/24hr Events (06/10/22):   06/10/2022 - Pt seen and evaluated She c/o POTTS with any movement. She denies overt CP. Assessment & Plan:     Principal Problem:    Acute on chronic respiratory failure (HCC)    IMP:    1.) Acute on Chronic Resp Failure - due to Severe Prob Gold stage 4 COPD. RVP is NGTD. ? URI. Ck CTA Chest. Ck Sputum. 2.) Severe COPD - see above, CK CT chest, continue pulm toilet    3.) HTN    4.) Anemia - Hb ok, ? Chronic Blood loss    5.) CAD - ? Contribution to above, ck EKG, CE    6.) H/O SDH    7.) Hyperthyroidism - on Tapazole    8.) Debility      Plan:    Ck EKG, CE  Ck CT chest  Ck sputum  Add abx  Ck am labs  Ck CXR          Discharge Planning:      Home     Diet:  ADULT DIET;  Regular  DVT PPx: LMWH  Code status: Limited    Hospital Problems:  Principal Problem:    Acute on chronic respiratory failure (HCC)  Active Problems:    Essential hypertension, benign    Iron deficiency anemia due to chronic blood loss    Chronic respiratory failure with hypoxia ([facility]    COPD, very severe ([facility]    DNR (do not resuscitate)    Centrilobular emphysema ([facility]  Resolved Problems:    * No resolved hospital problems. *      Objective:     Patient Vitals for the past 24 hrs:   Temp Pulse Resp BP SpO2   06/10/22 1253 -- 82 18 -- 92 %   06/10/22 1200 98.1 °F (36.7 °C) 80 18 133/60 92 %   06/10/22 0947 -- 87 16 -- 93 %   06/10/22 0800 97.7 °F (36.5 °C) 87 15 127/61 93 %   06/10/22 0324 98.4 °F (36.9 °C) 89 22 139/67 93 %   06/09/22 2339 -- -- 18 -- 92 %   06/09/22 2325 97.7 °F (36.5 °C) 83 20 137/72 92 %   06/09/22 1929 97.7 °F (36.5 °C) 86 20 (!) 114/59 93 %   06/09/22 1710 -- 76 16 -- 94 %   06/09/22 1614 -- -- -- -- 92 %   06/09/22 1600 97.5 °F (36.4 °C) 77 18 (!) 135/51 95 %       Oxygen Therapy  SpO2: 92 %  Pulse Oximeter Device Mode: Intermittent  O2 Device: Nasal cannula  O2 Flow Rate (L/min): 1 L/min    Estimated body mass index is 21.29 kg/m² as calculated from the following:    Height as of this encounter: 5' 8\" (1.727 m). Weight as of this encounter: 140 lb (63.5 kg). Intake/Output Summary (Last 24 hours) at 6/10/2022 1445  Last data filed at 6/10/2022 1200  Gross per 24 hour   Intake 480 ml   Output --   Net 480 ml         Physical Exam:   Pt examined 06/10/2022   Blood pressure 133/60, pulse 82, temperature 98.1 °F (36.7 °C), temperature source Oral, resp. rate 18, height 5' 8\" (1.727 m), weight 140 lb (63.5 kg), SpO2 92 %. General:    Ill appearing   Head:  Normocephalic, atraumatic, thin  Eyes:  Sclerae appear normal.  Pupils equally round. ENT:  Nares appear normal, no drainage. Moist oral mucosa  Neck:  No restricted ROM. Trachea midline   CV:   RRR. No m/r/g. No jugular venous distension. Lungs:   Diminished with scat rhonchi. No rales. Respirations elabored with movement  Abdomen: Bowel sounds present. Soft, nontender, nondistended. Extremities: No cyanosis or clubbing. No edema  Skin:     No rashes and normal coloration. Warm and dry. Neuro:  CN II-XII grossly intact. Sensation intact. A&Ox3  Psych:  Normal mood and affect.       I have reviewed ordered lab tests and independently visualized imaging below:    Recent Labs:  Recent Results (from the past 48 hour(s))   CBC with Auto Differential    Collection Time: 06/09/22  6:00 AM   Result Value Ref Range    WBC 7.0 4.3 - 11.1 K/uL    RBC 3.85 (L) 4.05 - 5.2 M/uL    Hemoglobin 11.1 (L) 11.7 - 15.4 g/dL    Hematocrit 34.7 (L) 35.8 - 46.3 %    MCV 90.1 79.6 - 97.8 FL    MCH 28.8 26.1 - 32.9 PG    MCHC 32.0 31.4 - 35.0 g/dL    RDW 14.4 11.9 - 14.6 %    Platelets 460 929 - 006 K/uL    MPV 9.7 9.4 - 12.3 FL    nRBC 0.00 0.0 - 0.2 K/uL    Differential Type AUTOMATED      Seg Neutrophils 68 43 - 78 %    Lymphocytes 20 13 - 44 %    Monocytes 11 4.0 - 12.0 %    Eosinophils % 0 (L) 0.5 - 7.8 %    Basophils 1 0.0 - 2.0 %    Immature Granulocytes 0 0.0 - 5.0 %    Segs Absolute 4.8 1.7 - 8.2 K/UL    Absolute Lymph # 1.4 0.5 - 4.6 K/UL    Absolute Mono # 0.8 0.1 - 1.3 K/UL    Absolute Eos # 0.0 0.0 - 0.8 K/UL    Basophils Absolute 0.1 0.0 - 0.2 K/UL    Absolute Immature Granulocyte 0.0 0.0 - 0.5 K/UL   Comprehensive Metabolic Panel    Collection Time: 06/09/22  6:00 AM   Result Value Ref Range    Sodium 136 136 - 145 mmol/L    Potassium 3.5 3.5 - 5.1 mmol/L    Chloride 103 98 - 107 mmol/L    CO2 26 21 - 32 mmol/L    Anion Gap 7 7 - 16 mmol/L    Glucose 80 65 - 100 mg/dL    BUN 10 8 - 23 MG/DL    CREATININE 0.80 0.6 - 1.0 MG/DL    GFR African American >60 >60 ml/min/1.73m2    GFR Non- >60 >60 ml/min/1.73m2    Calcium 8.9 8.3 - 10.4 MG/DL    Total Bilirubin 0.5 0.2 - 1.1 MG/DL    ALT 15 12 - 65 U/L    AST 18 15 - 37 U/L    Alk Phosphatase 78 50 - 136 U/L    Total Protein 7.5 6.3 - 8.2 g/dL    Albumin 3.1 (L) 3.2 - 4.6 g/dL    Globulin 4.4 (H) 2.3 - 3.5 g/dL    Albumin/Globulin Ratio 0.7 (L) 1.2 - 3.5     proBNP, N-TERMINAL    Collection Time: 06/09/22  6:00 AM   Result Value Ref Range    NT Pro-BNP 3,942 (H) 5 - 125 PG/ML   Procalcitonin    Collection Time: 06/09/22  6:00 AM   Result Value Ref Range    Procalcitonin <0.05 0.00 - 0.49 ng/mL   Troponin    Collection Time: 06/09/22  6:00 AM   Result Value Ref Range    Troponin, High Sensitivity 31.4 (H) 0 - 14 pg/mL   Respiratory Panel, Molecular, with COVID-19 (Restricted: peds pts or suitable admitted adults)    Collection Time: 06/09/22  8:14 AM    Specimen: Nasopharyngeal   Result Value Ref Range    Adenovirus by PCR NOT DETECTED      Coronavirus 229E by PCR NOT DETECTED      Coronavirus HKU1 by PCR NOT DETECTED      Coronavirus NL63 by PCR NOT DETECTED      Coronavirus OC43 by PCR NOT DETECTED      SARS-CoV-2, PCR NOT DETECTED      Human Metapneumovirus by PCR NOT DETECTED      Rhinovirus Enterovirus PCR NOT DETECTED      Influenza A by PCR NOT DETECTED      INFLUENZA B PCR NOT DETECTED      Parainfluenza 1 PCR NOT DETECTED      Parainfluenza 2 PCR NOT DETECTED      Parainfluenza 3 PCR NOT DETECTED      Parainfluenza 4 PCR NOT DETECTED      Respiratory Syncytial Virus by PCR NOT DETECTED      Bordetella parapertussis by PCR NOT DETECTED      Bordetella pertussis by PCR NOT DETECTED      Chlamydophila Pneumonia PCR NOT DETECTED      Mycoplasma pneumo by PCR NOT DETECTED     Culture, Blood 1    Collection Time: 06/09/22  2:03 PM    Specimen: Blood   Result Value Ref Range    Special Requests LEFT  HAND        Culture NO GROWTH AFTER 17 HOURS         Other Studies:  XR CHEST PORTABLE   Final Result   1. Emphysema. 2. Coronary artery disease. 3. Unchanged nodular consolidation in the left lower lobe.           Current Meds:  Current Facility-Administered Medications   Medication Dose Route Frequency    guaiFENesin (MUCINEX) extended release tablet 600 mg  600 mg Oral BID PRN    aspirin chewable tablet 81 mg  81 mg Oral Daily    mometasone-formoterol (DULERA) 200-5 MCG/ACT inhaler 2 puff  2 puff Inhalation BID    clopidogrel (PLAVIX) tablet 75 mg 75 mg Oral Daily    escitalopram (LEXAPRO) tablet 10 mg  10 mg Oral Daily    ezetimibe (ZETIA) tablet 10 mg  10 mg Oral Daily    levalbuterol (XOPENEX) nebulizer solution 1.25 mg  1.25 mg Nebulization Q4H PRN    methIMAzole (TAPAZOLE) tablet 10 mg  10 mg Oral Daily    metoprolol succinate (TOPROL XL) extended release tablet 100 mg  100 mg Oral Daily    pantoprazole (PROTONIX) tablet 40 mg  40 mg Oral BID    Roflumilast (DALIRESP) tablet 500 mcg (Patient Supplied)  500 mcg Oral Daily    tiotropium (SPIRIVA RESPIMAT) 2.5 MCG/ACT inhaler 2 puff  2 puff Inhalation Daily    sodium chloride flush 0.9 % injection 5-40 mL  5-40 mL IntraVENous 2 times per day    sodium chloride flush 0.9 % injection 5-40 mL  5-40 mL IntraVENous PRN    0.9 % sodium chloride infusion   IntraVENous PRN    ondansetron (ZOFRAN-ODT) disintegrating tablet 4 mg  4 mg Oral Q8H PRN    Or    ondansetron (ZOFRAN) injection 4 mg  4 mg IntraVENous Q6H PRN    polyethylene glycol (GLYCOLAX) packet 17 g  17 g Oral Daily PRN    enoxaparin (LOVENOX) injection 40 mg  40 mg SubCUTAneous Daily    acetaminophen (TYLENOL) tablet 650 mg  650 mg Oral Q6H PRN    Or    acetaminophen (TYLENOL) suppository 650 mg  650 mg Rectal Q6H PRN    predniSONE (DELTASONE) tablet 40 mg  40 mg Oral Daily    azithromycin (ZITHROMAX) 500 mg in sodium chloride 0.9 % 250 mL IVPB ([id number])  500 mg IntraVENous Q24H    albuterol (PROVENTIL) nebulizer solution 2.5 mg  2.5 mg Nebulization Q4H WA    atorvastatin (LIPITOR) tablet 80 mg  80 mg Oral Daily       Signed:    Lesly Loredo. Tim Saavedra MD, [address]  Internal Medicine - Hospitalist    Part of this note may have been written by using a voice dictation software. The note has been proof read but may still contain some grammatical/other typographical errors.

## 2022-06-10 NOTE — PROGRESS NOTES
PHYSICAL THERAPY Daily Note and AM  (Link to Caseload Tracking: PT Visit Days : 2  Time In/Out PT Charge Capture  Rehab Caseload Tracker  Orders      Eric Ramirez is a 66 y.o. female   PRIMARY DIAGNOSIS: Acute on chronic respiratory failure (HCC)  COPD exacerbation ([facility] [J44.1]  Acute on chronic respiratory failure ([facility] [J96.20]       Inpatient: Payor: MEDICARE / Plan: MEDICARE PART A AND B / Product Type: *No Product type* /     ASSESSMENT:     REHAB RECOMMENDATIONS:   Recommendation to date pending progress:  Setting:   Outpatient Therapy    Equipment:     To Be Determined     ASSESSMENT:  Ms. Gerard Zamora presents sitting up on side of bed and very agreeable to therapy. She stood and ambulated 61' with standing rest break after 30'. She is on 1L O2 throughout session, sats 94-96 during standing rest break. She returned to side of bed and RT in to see pt. Will continue with POC.      SUBJECTIVE:   Ms. Gerard Zamora states, \"The more I do the better\"     Social/Functional Lives With: Significant other  Type of Home: House  Home Layout: One level  Home Access: Stairs to enter without rails  Entrance Stairs - Number of Steps: 1  Entrance Stairs - Rails: None  Bathroom Shower/Tub: Walk-in shower  Bathroom Equipment: Shower chair  Bathroom Accessibility: Accessible  Home Equipment: Walker, rolling  ADL Assistance: Independent  Homemaking Assistance: Independent  Homemaking Responsibilities: Yes  Meal Prep Responsibility: Primary  Laundry Responsibility: Primary  Cleaning Responsibility: Primary  Bill Paying/Finance Responsibility: Primary  Shopping Responsibility: Primary  Dependent Care Responsibility: Primary  Health Care Management: Primary  Ambulation Assistance: Independent  Transfer Assistance: Independent  Active : Yes  Mode of Transportation: Car  Occupation: Retired  OBJECTIVE:     PAIN: VITALS / O2: PRECAUTION / Caden Bellis / DRAINS:   Pre Treatment:   Pain Level: 0      Post Treatment: 0 Vitals        Oxygen    None    RESTRICTIONS/PRECAUTIONS:        MOBILITY: I Mod I S SBA CGA Min Mod Max Total  NT x2 Comments:   Bed Mobility    Rolling [] [] [] [] [] [] [] [] [] [] []    Supine to Sit [] [] [] [] [] [] [] [] [] [] []    Scooting [] [] [] [] [] [] [] [] [] [] []    Sit to Supine [] [] [] [] [] [] [] [] [] [] []    Transfers    Sit to Stand [] [] [] [x] [] [] [] [] [] [] []    Bed to Chair [] [] [] [] [] [] [] [] [] [] []    Stand to Sit [] [] [] [x] [] [] [] [] [] [] []     [] [] [] [] [] [] [] [] [] [] []    I=Independent, Mod I=Modified Independent, S=Supervision, SBA=Standby Assistance, CGA=Contact Guard Assistance,   Min=Minimal Assistance, Mod=Moderate Assistance, Max=Maximal Assistance, Total=Total Assistance, NT=Not Tested    BALANCE: Good Fair+ Fair Fair- Poor NT Comments   Sitting Static [x] [] [] [] [] []    Sitting Dynamic [x] [] [] [] [] []              Standing Static [x] [] [] [] [] []    Standing Dynamic [x] [x] [] [] [] []      GAIT: I Mod I S SBA CGA Min Mod Max Total  NT x2 Comments:   Level of Assistance [] [] [] [x] [] [] [] [] [] [] []    Distance 30'  x 2 feet    DME None    Gait Quality Decreased german  and Trunk sway increased    Weightbearing Status      Stairs      I=Independent, Mod I=Modified Independent, S=Supervision, SBA=Standby Assistance, CGA=Contact Guard Assistance,   Min=Minimal Assistance, Mod=Moderate Assistance, Max=Maximal Assistance, Total=Total Assistance, NT=Not Tested    PLAN:   ACUTE PHYSICAL THERAPY GOALS:   (Developed with and agreed upon by patient and/or caregiver.)  1. Pt will ambulate 500 ft SB (A) with use of LRAD and breaks as needed in 7 therapy sessions. 2. Pt will perform standing dynamic balance activities with minimal postural sway in 7 therapy sessions.   3. Pt will tolerate multiple sets and reps of BLE exercises in 7 therapy sessions.       FREQUENCY AND DURATION: 3 times/week for duration of hospital stay or until stated goals are met, whichever comes first.    TREATMENT:   TREATMENT:   Therapeutic Activity (14 Minutes): Therapeutic activity included Ambulation on level ground, Standing balance and sit to stand to improve functional Activity tolerance, Balance and Mobility.     TREATMENT GRID:  N/A    AFTER TREATMENT PRECAUTIONS: Bed, Bed/Chair Locked, Call light within reach and Needs within reach    INTERDISCIPLINARY COLLABORATION:  RN/ PCT and PT/ PTA    EDUCATION:      TIME IN/OUT:  Time In: 0930  Time Out: 1769  Minutes: 14    ISIDRO MIGUEL PTA

## 2022-06-10 NOTE — PLAN OF CARE
Problem: Discharge Planning  Goal: Discharge to home or other facility with appropriate resources  6/9/2022 2023 by Angie Maradiaga RN  Outcome: Progressing  6/9/2022 1635 by Marciano Driscoll RN  Outcome: Progressing     Problem: Pain  Goal: Verbalizes/displays adequate comfort level or baseline comfort level  6/9/2022 2023 by Angie Maradiaga RN  Outcome: Progressing  6/9/2022 1635 by Marciano Driscoll RN  Outcome: Progressing     Problem: Skin/Tissue Integrity  Goal: Absence of new skin breakdown  Description: 1. Monitor for areas of redness and/or skin breakdown  2. Assess vascular access sites hourly  3. Every 4-6 hours minimum:  Change oxygen saturation probe site  4. Every 4-6 hours:  If on nasal continuous positive airway pressure, respiratory therapy assess nares and determine need for appliance change or resting period.   6/9/2022 2023 by Angie Maradiaga RN  Outcome: Progressing  6/9/2022 1635 by Marciano Driscoll RN  Outcome: Progressing

## 2022-06-11 ENCOUNTER — APPOINTMENT (OUTPATIENT)
Dept: CT IMAGING | Age: 72
DRG: 190 | End: 2022-06-11
Payer: MEDICARE

## 2022-06-11 LAB — TROPONIN I SERPL HS-MCNC: 23.2 PG/ML (ref 0–14)

## 2022-06-11 PROCEDURE — 6370000000 HC RX 637 (ALT 250 FOR IP): Performed by: FAMILY MEDICINE

## 2022-06-11 PROCEDURE — 6360000004 HC RX CONTRAST MEDICATION

## 2022-06-11 PROCEDURE — 2580000003 HC RX 258: Performed by: FAMILY MEDICINE

## 2022-06-11 PROCEDURE — 36415 COLL VENOUS BLD VENIPUNCTURE: CPT

## 2022-06-11 PROCEDURE — 2700000000 HC OXYGEN THERAPY PER DAY

## 2022-06-11 PROCEDURE — 71260 CT THORAX DX C+: CPT

## 2022-06-11 PROCEDURE — 6360000002 HC RX W HCPCS: Performed by: STUDENT IN AN ORGANIZED HEALTH CARE EDUCATION/TRAINING PROGRAM

## 2022-06-11 PROCEDURE — 76937 US GUIDE VASCULAR ACCESS: CPT

## 2022-06-11 PROCEDURE — 6370000000 HC RX 637 (ALT 250 FOR IP): Performed by: HOSPITALIST

## 2022-06-11 PROCEDURE — 6360000002 HC RX W HCPCS: Performed by: FAMILY MEDICINE

## 2022-06-11 PROCEDURE — 84484 ASSAY OF TROPONIN QUANT: CPT

## 2022-06-11 PROCEDURE — 6370000000 HC RX 637 (ALT 250 FOR IP): Performed by: INTERNAL MEDICINE

## 2022-06-11 PROCEDURE — 94640 AIRWAY INHALATION TREATMENT: CPT

## 2022-06-11 PROCEDURE — 94760 N-INVAS EAR/PLS OXIMETRY 1: CPT

## 2022-06-11 PROCEDURE — 1100000000 HC RM PRIVATE

## 2022-06-11 RX ORDER — TORSEMIDE 20 MG/1
10 TABLET ORAL DAILY
Status: DISCONTINUED | OUTPATIENT
Start: 2022-06-11 | End: 2022-06-12 | Stop reason: HOSPADM

## 2022-06-11 RX ORDER — TORSEMIDE 10 MG/1
10 TABLET ORAL DAILY
COMMUNITY

## 2022-06-11 RX ORDER — LORAZEPAM 2 MG/ML
0.5 INJECTION INTRAMUSCULAR EVERY 6 HOURS PRN
Status: DISCONTINUED | OUTPATIENT
Start: 2022-06-11 | End: 2022-06-12 | Stop reason: HOSPADM

## 2022-06-11 RX ADMIN — ALBUTEROL SULFATE 2.5 MG: 2.5 SOLUTION RESPIRATORY (INHALATION) at 11:11

## 2022-06-11 RX ADMIN — SODIUM CHLORIDE, PRESERVATIVE FREE 10 ML: 5 INJECTION INTRAVENOUS at 08:03

## 2022-06-11 RX ADMIN — EZETIMIBE 10 MG: 10 TABLET ORAL at 07:57

## 2022-06-11 RX ADMIN — CLOPIDOGREL BISULFATE 75 MG: 75 TABLET ORAL at 07:56

## 2022-06-11 RX ADMIN — PANTOPRAZOLE SODIUM 40 MG: 40 TABLET, DELAYED RELEASE ORAL at 21:07

## 2022-06-11 RX ADMIN — ESCITALOPRAM OXALATE 10 MG: 10 TABLET ORAL at 07:57

## 2022-06-11 RX ADMIN — LORAZEPAM 0.5 MG: 2 INJECTION INTRAMUSCULAR; INTRAVENOUS at 21:08

## 2022-06-11 RX ADMIN — SODIUM CHLORIDE, PRESERVATIVE FREE 10 ML: 5 INJECTION INTRAVENOUS at 21:11

## 2022-06-11 RX ADMIN — ALBUTEROL SULFATE 2.5 MG: 2.5 SOLUTION RESPIRATORY (INHALATION) at 08:31

## 2022-06-11 RX ADMIN — METOPROLOL SUCCINATE 100 MG: 100 TABLET, EXTENDED RELEASE ORAL at 07:57

## 2022-06-11 RX ADMIN — GUAIFENESIN 600 MG: 600 TABLET ORAL at 14:45

## 2022-06-11 RX ADMIN — PANTOPRAZOLE SODIUM 40 MG: 40 TABLET, DELAYED RELEASE ORAL at 07:56

## 2022-06-11 RX ADMIN — LORAZEPAM 0.5 MG: 2 INJECTION INTRAMUSCULAR; INTRAVENOUS at 14:45

## 2022-06-11 RX ADMIN — GUAIFENESIN 600 MG: 600 TABLET ORAL at 21:07

## 2022-06-11 RX ADMIN — IOPAMIDOL 100 ML: 755 INJECTION, SOLUTION INTRAVENOUS at 11:56

## 2022-06-11 RX ADMIN — ASPIRIN 81 MG: 81 TABLET, CHEWABLE ORAL at 07:57

## 2022-06-11 RX ADMIN — ALBUTEROL SULFATE 2.5 MG: 2.5 SOLUTION RESPIRATORY (INHALATION) at 20:01

## 2022-06-11 RX ADMIN — ATORVASTATIN CALCIUM 80 MG: 80 TABLET, FILM COATED ORAL at 21:07

## 2022-06-11 RX ADMIN — PREDNISONE 40 MG: 20 TABLET ORAL at 07:57

## 2022-06-11 RX ADMIN — LEVOFLOXACIN 500 MG: 500 TABLET, FILM COATED ORAL at 07:57

## 2022-06-11 RX ADMIN — TIOTROPIUM BROMIDE INHALATION SPRAY 2 PUFF: 3.12 SPRAY, METERED RESPIRATORY (INHALATION) at 08:31

## 2022-06-11 RX ADMIN — ALBUTEROL SULFATE 2.5 MG: 2.5 SOLUTION RESPIRATORY (INHALATION) at 16:48

## 2022-06-11 RX ADMIN — METHIMAZOLE 10 MG: 5 TABLET ORAL at 07:57

## 2022-06-11 RX ADMIN — ENOXAPARIN SODIUM 40 MG: 100 INJECTION SUBCUTANEOUS at 07:56

## 2022-06-11 ASSESSMENT — PAIN SCALES - GENERAL
PAINLEVEL_OUTOF10: 0
PAINLEVEL_OUTOF10: 0

## 2022-06-11 NOTE — PROGRESS NOTES
US Guided PIV access    Called for assistance with IV access. Ultrasound was used to find the vein which was compressible and does not have any features of an artery or nerve bundle. Skin was cleaned and disinfected prior to IV puncture. Under real-time ultrasound guidance peripheral access was obtained in the left forearm using 20 G 1.75\" Peripheral IV catheter x 1 attempt. Blood return was present and IV flushed without difficulty. IV dressing applied, no immediate complications noted, and patient tolerated the procedure well.     Martinez Chun RN, PCCN

## 2022-06-11 NOTE — PROGRESS NOTES
Hospitalist Progress Note   Admit Date:  2022  4:39 AM   Name:  Jarad Christian   Age:  66 y.o. Sex:  female  :  1950   MRN:  [id number]   Room:  [facility]/    Presenting Complaint: Shortness of Breath     Reason(s) for Admission: [facility] exacerbation ([facility] [J44.1]  Acute on chronic respiratory failure [facility] Course & Interval History:   30-year-old female history of severe COPD, anemia, hypertension, pulmonary nodule presented with worsening shortness of breath. Patient has begun wearing oxygen during the day during about a month ago for comfort. She noted worsening shortness of breath 4 days prior to admission. Patient does have occasional cough but not any worse this week. Patient denied any other fevers, chills, palpitations, nausea vomiting, cardiac chest pain. Subjective/24hr Events (22): Patient was seen and examined at the bedside. No overnight events. Patient states she is feeling better today. Currently on 1 L nasal cannula which is her baseline at home. Patient denied any cardiac chest pain, abdominal pain, fever/chills. Assessment & Plan:   Acute on chronic respiratory failure  - Likely secondary to severe Gold stage IV COPD  - Respiratory viral panel nothing grown to date  - CT chest no evidence of pulmonary embolism. Looks to be like an interval increase in left lower lobe mass suspicious for bronchogenic carcinoma. Also a left hilar lymph node also enlarged concerning for metastatic adenopathy. Newly visualized interstitial opacities with a benign the upper lobes right greater than left most likely infectious. Stable left lower lobe groundglass opacity.   Interval compression fractures with mild height loss at T10 and T11.  - Continue antibiotics, Levaquin  -continue supplemental oxygen to maintain oxygen saturation greater than 88%  -Sputum cultures pending    Severe COPD  - CT chest findings as above  - Continue pulm toilet  -continue Dulera, Roflumilast  - Continue prednisone  - Consider pulm consult if respiratory status worsens    Hypertension  - Continue home medications    Coronary artery disease  - Continue aspirin, Plavix, statin    Hyperthyroidism  - Continue Tapazole    Anemia  - Hemoglobin stable  - Chronic blood loss? Discharge Planning:    Dispo pending clinical course. Likely discharge next 24 hours    Diet:  ADULT DIET; Regular  DVT PPx: Lovenox  Code status: Limited    Hospital Problems:  Principal Problem:    Acute on chronic respiratory failure (HCC)  Active Problems:    Essential hypertension, benign    Iron deficiency anemia due to chronic blood loss    Chronic respiratory failure with hypoxia (HCC)    COPD, very severe (HCC)    DNR (do not resuscitate)    Centrilobular emphysema ([facility]  Resolved Problems:    * No resolved hospital problems. *      Objective:     Patient Vitals for the past 24 hrs:   Temp Pulse Resp BP SpO2   06/11/22 1223 97.9 °F (36.6 °C) 94 16 (!) 156/79 90 %   06/11/22 1112 -- 83 17 -- 96 %   06/11/22 0831 -- 77 18 -- 97 %   06/11/22 0746 98.2 °F (36.8 °C) 75 16 (!) 155/68 96 %   06/11/22 0319 98.4 °F (36.9 °C) 78 20 139/60 95 %   06/10/22 2326 98.6 °F (37 °C) 84 20 (!) 143/63 94 %   06/10/22 1954 -- 73 20 -- 94 %   06/10/22 1926 98.1 °F (36.7 °C) 87 20 (!) 150/68 94 %   06/10/22 1615 -- 84 16 -- 95 %   06/10/22 1600 98.1 °F (36.7 °C) 86 16 (!) 156/73 93 %       Oxygen Therapy  SpO2: 90 %  Pulse Oximeter Device Mode: Intermittent  Pulse Oximeter Device Location: Finger  O2 Device: Nasal cannula  O2 Flow Rate (L/min): 1 L/min    Estimated body mass index is 21.29 kg/m² as calculated from the following:    Height as of this encounter: 5' 8\" (1.727 m). Weight as of this encounter: 140 lb (63.5 kg).     Intake/Output Summary (Last 24 hours) at 6/11/2022 1422  Last data filed at 6/11/2022 0831  Gross per 24 hour   Intake 480 ml   Output --   Net 480 ml         Physical Exam:   Blood pressure (!) 156/79, pulse 94, temperature 97.9 °F (36.6 °C), temperature source Oral, resp. rate 16, height 5' 8\" (1.727 m), weight 140 lb (63.5 kg), SpO2 90 %. General:    Well nourished. Head:  Normocephalic, atraumatic  Eyes:  Sclerae appear normal.  Pupils equally round. ENT:  Nares appear normal, no drainage. Moist oral mucosa  Neck:  No restricted ROM. Trachea midline   CV:   RRR. No m/r/g. No jugular venous distension. Lungs:   Decreased breath sounds bilaterally. No wheezing, rhonchi, or rales. Respirations even, unlabored  Abdomen: Bowel sounds present. Soft, nontender, nondistended. Extremities: No cyanosis or clubbing. No edema  Skin:     No rashes and normal coloration. Warm and dry. Neuro:  CN II-XII grossly intact. Sensation intact. A&Ox3  Psych:  Normal mood and affect.       I have reviewed ordered lab tests and independently visualized imaging below:    Recent Labs:  Recent Results (from the past 48 hour(s))   EKG 12 Lead    Collection Time: 06/10/22  3:15 PM   Result Value Ref Range    Ventricular Rate 85 BPM    Atrial Rate 85 BPM    P-R Interval 150 ms    QRS Duration 78 ms    Q-T Interval 378 ms    QTc Calculation (Bazett) 449 ms    P Axis 79 degrees    R Axis 59 degrees    T Axis 84 degrees    Diagnosis Normal sinus rhythm    Troponin    Collection Time: 06/10/22  4:57 PM   Result Value Ref Range    Troponin, High Sensitivity 14.1 (H) 0 - 14 pg/mL   Basic Metabolic Panel    Collection Time: 06/10/22  4:57 PM   Result Value Ref Range    Sodium 140 136 - 145 mmol/L    Potassium 4.3 3.5 - 5.1 mmol/L    Chloride 106 98 - 107 mmol/L    CO2 25 21 - 32 mmol/L    Anion Gap 9 7 - 16 mmol/L    Glucose 158 (H) 65 - 100 mg/dL    BUN 16 8 - 23 MG/DL    CREATININE 0.80 0.6 - 1.0 MG/DL    GFR African American >60 >60 ml/min/1.73m2    GFR Non- >60 >60 ml/min/1.73m2    Calcium 9.5 8.3 - 10.4 MG/DL   Troponin    Collection Time: 06/10/22  9:08 PM   Result Value Ref Range Troponin, High Sensitivity 16.0 (H) 0 - 14 pg/mL   Troponin    Collection Time: 06/11/22  2:56 AM   Result Value Ref Range    Troponin, High Sensitivity 23.2 (H) 0 - 14 pg/mL       Other Studies:  CT CHEST PULMONARY EMBOLISM W CONTRAST   Final Result   1. No evidence of pulmonary embolism. 2. Interval increase in left lower lobe mass suspicious for bronchogenic   carcinoma. A left hilar lymph node also is enlarged concerning for metastatic   adenopathy. 3. Newly visualized interstitial opacities within the upper lobes, right greater   than left, most likely infectious. 4. Stable left lower lobe groundglass opacity. 5. Interval compression fractures with mild height loss at T10 and T11. XR CHEST PORTABLE   Final Result   1. Emphysema. 2. Coronary artery disease. 3. Unchanged nodular consolidation in the left lower lobe.           Current Meds:  Current Facility-Administered Medications   Medication Dose Route Frequency    torsemide (DEMADEX) tablet 10 mg  10 mg Oral Daily    LORazepam (ATIVAN) injection 0.5 mg  0.5 mg IntraVENous Q6H PRN    guaiFENesin (MUCINEX) extended release tablet 600 mg  600 mg Oral BID PRN    levoFLOXacin (LEVAQUIN) tablet 500 mg  500 mg Oral Daily    aspirin chewable tablet 81 mg  81 mg Oral Daily    mometasone-formoterol (DULERA) 200-5 MCG/ACT inhaler 2 puff  2 puff Inhalation BID    clopidogrel (PLAVIX) tablet 75 mg  75 mg Oral Daily    escitalopram (LEXAPRO) tablet 10 mg  10 mg Oral Daily    ezetimibe (ZETIA) tablet 10 mg  10 mg Oral Daily    levalbuterol (XOPENEX) nebulizer solution 1.25 mg  1.25 mg Nebulization Q4H PRN    methIMAzole (TAPAZOLE) tablet 10 mg  10 mg Oral Daily    metoprolol succinate (TOPROL XL) extended release tablet 100 mg  100 mg Oral Daily    pantoprazole (PROTONIX) tablet 40 mg  40 mg Oral BID    Roflumilast (DALIRESP) tablet 500 mcg (Patient Supplied)  500 mcg Oral Daily    tiotropium (SPIRIVA RESPIMAT) 2.5 MCG/ACT inhaler 2 puff  2 puff Inhalation Daily    sodium chloride flush 0.9 % injection 5-40 mL  5-40 mL IntraVENous 2 times per day    sodium chloride flush 0.9 % injection 5-40 mL  5-40 mL IntraVENous PRN    0.9 % sodium chloride infusion   IntraVENous PRN    ondansetron (ZOFRAN-ODT) disintegrating tablet 4 mg  4 mg Oral Q8H PRN    Or    ondansetron (ZOFRAN) injection 4 mg  4 mg IntraVENous Q6H PRN    polyethylene glycol (GLYCOLAX) packet 17 g  17 g Oral Daily PRN    enoxaparin (LOVENOX) injection 40 mg  40 mg SubCUTAneous Daily    acetaminophen (TYLENOL) tablet 650 mg  650 mg Oral Q6H PRN    Or    acetaminophen (TYLENOL) suppository 650 mg  650 mg Rectal Q6H PRN    predniSONE (DELTASONE) tablet 40 mg  40 mg Oral Daily    albuterol (PROVENTIL) nebulizer solution 2.5 mg  2.5 mg Nebulization Q4H WA    atorvastatin (LIPITOR) tablet 80 mg  80 mg Oral Daily       Signed:  Leonard Lim MD    Part of this note may have been written by using a voice dictation software. The note has been proof read but may still contain some grammatical/other typographical errors. Fall with Harm Risk

## 2022-06-11 NOTE — PLAN OF CARE
Problem: Discharge Planning  Goal: Discharge to home or other facility with appropriate resources  6/11/2022 1948 by Ellen Melissa RN  Outcome: Progressing  6/11/2022 1116 by Brandy Gomez RN  Outcome: Progressing  Flowsheets (Taken 6/11/2022 0730)  Discharge to home or other facility with appropriate resources: Identify barriers to discharge with patient and caregiver     Problem: Pain  Goal: Verbalizes/displays adequate comfort level or baseline comfort level  6/11/2022 1948 by Ellen Melissa RN  Outcome: Progressing  6/11/2022 1116 by Brandy Gomez RN  Outcome: Progressing     Problem: Skin/Tissue Integrity  Goal: Absence of new skin breakdown  Description: 1. Monitor for areas of redness and/or skin breakdown  2. Assess vascular access sites hourly  3. Every 4-6 hours minimum:  Change oxygen saturation probe site  4. Every 4-6 hours:  If on nasal continuous positive airway pressure, respiratory therapy assess nares and determine need for appliance change or resting period.   6/11/2022 1948 by Ellen Melissa RN  Outcome: Progressing  6/11/2022 1116 by Brandy Gomez RN  Outcome: Progressing

## 2022-06-12 VITALS
OXYGEN SATURATION: 93 % | BODY MASS INDEX: 21.22 KG/M2 | DIASTOLIC BLOOD PRESSURE: 70 MMHG | HEIGHT: 68 IN | RESPIRATION RATE: 20 BRPM | WEIGHT: 140 LBS | SYSTOLIC BLOOD PRESSURE: 153 MMHG | TEMPERATURE: 98.4 F | HEART RATE: 81 BPM

## 2022-06-12 LAB
ALBUMIN SERPL-MCNC: 2.8 G/DL (ref 3.2–4.6)
ALBUMIN/GLOB SERPL: 0.7 {RATIO} (ref 1.2–3.5)
ALP SERPL-CCNC: 67 U/L (ref 50–136)
ALT SERPL-CCNC: 16 U/L (ref 12–65)
ANION GAP SERPL CALC-SCNC: 8 MMOL/L (ref 7–16)
AST SERPL-CCNC: 13 U/L (ref 15–37)
BASOPHILS # BLD: 0 K/UL (ref 0–0.2)
BASOPHILS NFR BLD: 0 % (ref 0–2)
BILIRUB SERPL-MCNC: 0.4 MG/DL (ref 0.2–1.1)
BUN SERPL-MCNC: 11 MG/DL (ref 8–23)
CALCIUM SERPL-MCNC: 9 MG/DL (ref 8.3–10.4)
CHLORIDE SERPL-SCNC: 106 MMOL/L (ref 98–107)
CO2 SERPL-SCNC: 26 MMOL/L (ref 21–32)
CREAT SERPL-MCNC: 0.7 MG/DL (ref 0.6–1)
DIFFERENTIAL METHOD BLD: ABNORMAL
EOSINOPHIL # BLD: 0 K/UL (ref 0–0.8)
EOSINOPHIL NFR BLD: 1 % (ref 0.5–7.8)
ERYTHROCYTE [DISTWIDTH] IN BLOOD BY AUTOMATED COUNT: 14.3 % (ref 11.9–14.6)
GLOBULIN SER CALC-MCNC: 3.8 G/DL (ref 2.3–3.5)
GLUCOSE SERPL-MCNC: 95 MG/DL (ref 65–100)
HCT VFR BLD AUTO: 32.5 % (ref 35.8–46.3)
HGB BLD-MCNC: 10 G/DL (ref 11.7–15.4)
IMM GRANULOCYTES # BLD AUTO: 0 K/UL (ref 0–0.5)
IMM GRANULOCYTES NFR BLD AUTO: 0 % (ref 0–5)
LYMPHOCYTES # BLD: 1.6 K/UL (ref 0.5–4.6)
LYMPHOCYTES NFR BLD: 20 % (ref 13–44)
MCH RBC QN AUTO: 27.4 PG (ref 26.1–32.9)
MCHC RBC AUTO-ENTMCNC: 30.8 G/DL (ref 31.4–35)
MCV RBC AUTO: 89 FL (ref 79.6–97.8)
MONOCYTES # BLD: 0.8 K/UL (ref 0.1–1.3)
MONOCYTES NFR BLD: 10 % (ref 4–12)
NEUTS SEG # BLD: 5.3 K/UL (ref 1.7–8.2)
NEUTS SEG NFR BLD: 69 % (ref 43–78)
NRBC # BLD: 0 K/UL (ref 0–0.2)
PLATELET # BLD AUTO: 279 K/UL (ref 150–450)
PMV BLD AUTO: 9.4 FL (ref 9.4–12.3)
POTASSIUM SERPL-SCNC: 3.8 MMOL/L (ref 3.5–5.1)
PROT SERPL-MCNC: 6.6 G/DL (ref 6.3–8.2)
RBC # BLD AUTO: 3.65 M/UL (ref 4.05–5.2)
SODIUM SERPL-SCNC: 140 MMOL/L (ref 136–145)
WBC # BLD AUTO: 7.7 K/UL (ref 4.3–11.1)

## 2022-06-12 PROCEDURE — 2580000003 HC RX 258: Performed by: FAMILY MEDICINE

## 2022-06-12 PROCEDURE — 94760 N-INVAS EAR/PLS OXIMETRY 1: CPT

## 2022-06-12 PROCEDURE — 6360000002 HC RX W HCPCS: Performed by: FAMILY MEDICINE

## 2022-06-12 PROCEDURE — 94640 AIRWAY INHALATION TREATMENT: CPT

## 2022-06-12 PROCEDURE — 80053 COMPREHEN METABOLIC PANEL: CPT

## 2022-06-12 PROCEDURE — 6370000000 HC RX 637 (ALT 250 FOR IP): Performed by: INTERNAL MEDICINE

## 2022-06-12 PROCEDURE — 85025 COMPLETE CBC W/AUTO DIFF WBC: CPT

## 2022-06-12 PROCEDURE — 6370000000 HC RX 637 (ALT 250 FOR IP): Performed by: FAMILY MEDICINE

## 2022-06-12 PROCEDURE — 6370000000 HC RX 637 (ALT 250 FOR IP): Performed by: STUDENT IN AN ORGANIZED HEALTH CARE EDUCATION/TRAINING PROGRAM

## 2022-06-12 PROCEDURE — 6370000000 HC RX 637 (ALT 250 FOR IP): Performed by: HOSPITALIST

## 2022-06-12 PROCEDURE — 36415 COLL VENOUS BLD VENIPUNCTURE: CPT

## 2022-06-12 PROCEDURE — 2700000000 HC OXYGEN THERAPY PER DAY

## 2022-06-12 RX ORDER — GUAIFENESIN 600 MG/1
600 TABLET, EXTENDED RELEASE ORAL 2 TIMES DAILY PRN
Qty: 14 TABLET | Refills: 0 | Status: SHIPPED | OUTPATIENT
Start: 2022-06-12 | End: 2022-06-19

## 2022-06-12 RX ORDER — PREDNISONE 20 MG/1
40 TABLET ORAL DAILY
Qty: 2 TABLET | Refills: 0 | Status: SHIPPED | OUTPATIENT
Start: 2022-06-13 | End: 2022-06-14

## 2022-06-12 RX ORDER — LEVOFLOXACIN 500 MG/1
500 TABLET, FILM COATED ORAL DAILY
Qty: 2 TABLET | Refills: 0 | Status: SHIPPED | OUTPATIENT
Start: 2022-06-13 | End: 2022-06-15

## 2022-06-12 RX ADMIN — ASPIRIN 81 MG: 81 TABLET, CHEWABLE ORAL at 08:04

## 2022-06-12 RX ADMIN — PREDNISONE 40 MG: 20 TABLET ORAL at 08:04

## 2022-06-12 RX ADMIN — ENOXAPARIN SODIUM 40 MG: 100 INJECTION SUBCUTANEOUS at 08:04

## 2022-06-12 RX ADMIN — EZETIMIBE 10 MG: 10 TABLET ORAL at 08:04

## 2022-06-12 RX ADMIN — LEVOFLOXACIN 500 MG: 500 TABLET, FILM COATED ORAL at 08:04

## 2022-06-12 RX ADMIN — TIOTROPIUM BROMIDE INHALATION SPRAY 2 PUFF: 3.12 SPRAY, METERED RESPIRATORY (INHALATION) at 07:06

## 2022-06-12 RX ADMIN — METHIMAZOLE 10 MG: 5 TABLET ORAL at 08:04

## 2022-06-12 RX ADMIN — ESCITALOPRAM OXALATE 10 MG: 10 TABLET ORAL at 08:04

## 2022-06-12 RX ADMIN — TORSEMIDE 10 MG: 20 TABLET ORAL at 08:04

## 2022-06-12 RX ADMIN — ALBUTEROL SULFATE 2.5 MG: 2.5 SOLUTION RESPIRATORY (INHALATION) at 07:02

## 2022-06-12 RX ADMIN — SODIUM CHLORIDE, PRESERVATIVE FREE 10 ML: 5 INJECTION INTRAVENOUS at 08:05

## 2022-06-12 RX ADMIN — PANTOPRAZOLE SODIUM 40 MG: 40 TABLET, DELAYED RELEASE ORAL at 08:04

## 2022-06-12 RX ADMIN — GUAIFENESIN 600 MG: 600 TABLET ORAL at 08:03

## 2022-06-12 RX ADMIN — METOPROLOL SUCCINATE 100 MG: 100 TABLET, EXTENDED RELEASE ORAL at 08:04

## 2022-06-12 RX ADMIN — CLOPIDOGREL BISULFATE 75 MG: 75 TABLET ORAL at 08:04

## 2022-06-12 RX ADMIN — ACETAMINOPHEN 650 MG: 325 TABLET ORAL at 12:04

## 2022-06-12 RX ADMIN — ALBUTEROL SULFATE 2.5 MG: 2.5 SOLUTION RESPIRATORY (INHALATION) at 11:46

## 2022-06-12 ASSESSMENT — PAIN DESCRIPTION - LOCATION: LOCATION: BACK

## 2022-06-12 ASSESSMENT — PAIN SCALES - GENERAL: PAINLEVEL_OUTOF10: 3

## 2022-06-12 ASSESSMENT — PAIN DESCRIPTION - ORIENTATION: ORIENTATION: MID

## 2022-06-12 ASSESSMENT — PAIN DESCRIPTION - DESCRIPTORS: DESCRIPTORS: ACHING

## 2022-06-12 NOTE — CARE COORDINATION
Pt is medically cleared for dc to home today. Pt has declined offer for further services at [facility]. CM remains available to assist as needed. 06/12/22 1144   Service Assessment   Patient Orientation Alert and Oriented   Cognition Alert   History Provided By Patient;Medical Record   Primary Caregiver Self   Support Systems Spouse/Significant Other   PCP Verified by CM Yes   Last Visit to PCP Within last 3 months   Prior Functional Level Independent in ADLs/IADLs   Current Functional Level Independent in ADLs/IADLs   Can patient return to prior living arrangement Yes   Ability to make needs known: Good   Family able to assist with home care needs: Yes   Social/Functional History   Lives With Significant other   Type of [address] [facility] One level   Home Access Stairs to enter without rails   Entrance Stairs - Number of Steps 1   Entrance Stairs - Rails None   Bathroom Shower/Tub Walk-in shower   Bathroom Equipment Shower chair   [facility];Oxygen   [facility] From Family   ADL Assistance Independent   Homemaking Assistance Independent   Homemaking Responsibilities Yes   Meal Prep Responsibility Primary   Laundry Responsibility Primary   Cleaning Responsibility Primary   Bill Paying/Finance Responsibility Primary   [address] Management Primary   Ambulation Assistance Independent   Transfer Assistance Independent   Active  Yes   Mode of Transportation Car   Occupation Retired   Discharge Planning   Type of [address] Prior To Admission [address]   DME Ordered?  No   Potential Assistance Purchasing Medications No   Type of Home Care Services None   Patient expects to be discharged to: [facility] Discharge   Services [facility] Discharge [facility] Resource Information Provided?  No   Mode of Transport at Discharge Other (see comment)  (family)   Confirm Follow Up Transport Family

## 2022-06-12 NOTE — PROGRESS NOTES
Discharge instructions, medication side effects sheet, follow up appointment and prescriptions reviewed and explained to the patient. Patient verbalizes understanding of instructions. A copy of discharge instructions and prescriptions  have been given to patient. Opportunity for questions provided.

## 2022-06-12 NOTE — DISCHARGE SUMMARY
Hospitalist Discharge Summary   Admit Date:  2022  4:39 AM   DC Note date: 2022  Name:  Sherrill Beatty   Age:  66 y.o. Sex:  female  :  1950   MRN:  [id number]   Room:  [facility]  PCP:  Olman Rodriguez MD    Presenting Complaint: Shortness of Breath     Initial Admission Diagnosis: COPD exacerbation ([address] [J44.1]  Acute on chronic respiratory failure ([address] [J96.20]     Problem List for this Hospitalization (present on admission):    Principal Problem:    Acute on chronic respiratory failure ([address]  Active Problems:    Essential hypertension, benign    Iron deficiency anemia due to chronic blood loss    Chronic respiratory failure with hypoxia ([address]    COPD, very severe ([address]    DNR (do not resuscitate)    Centrilobular emphysema ([address]  Resolved Problems:    * No resolved hospital problems. *    Did Patient have Sepsis (YES OR NO): No    Hospital Course:  74-year-old female history of severe COPD, anemia, hypertension, pulmonary nodule presented with worsening shortness of breath. Patient has begun wearing oxygen during the day during about a month ago for comfort. She noted worsening shortness of breath 4 days prior to admission. Patient does have occasional cough but not any worse this week. Patient denied any other fevers, chills, palpitations, nausea vomiting, cardiac chest pain. Patient presented with acute on chronic respiratory failure likely secondary to severe Gold stage IV COPD. Respiratory panel grew nothing to date. CT chest showed no evidence of pulmonary embolism. Looks to be like an interval increase in left lower lobe mass suspicious for bronchogenic carcinoma. Also left hilar lymph node also enlarged concerning for metastatic adenopathy. Newly visualized interstitial opacities with upper lobes right greater than left most likely infectious. Stable left lower lobe groundglass opacity. Interval compression fractures with mild height loss at T10 and T11. Patient will continue antibiotics, Levaquin, until end of course. Patient will need to follow-up with her primary care provider within the next 3 to 5 days discussed recent hospitalization any changes made to her medications. For patient's severe COPD CT chest as above. Continue pulm toilet, Dulera and Roflumilast, prednisone. Patient will need outpatient follow-up with her pulmonologist for CT findings and severe COPD. For patient's other comorbid conditions she was restarted back on her home medications. Patient was advised to follow-up with PCP next 3 to 5 days and pulmonology within 1 week. Caution was made with patient, patient's daughter and patient's  about CT findings. All questions answered. Disposition: DC home  Diet: ADULT DIET; Regular  Code Status: Limited    Follow Ups:  Follow-up PCP next 3 to 5 days  Follow-up with pulmonology within 1 week    Follow up labs/diagnostics (ultimately defer to outpatient provider): For to outpatient provider    Time spent in patient discharge and coordination 45 minutes. Plan was discussed with patient, patient's daughter, patient's . All questions answered. Patient was stable at time of discharge. Instructions given to call a physician or return if any concerns.     Current Discharge Medication List      START taking these medications    Details   guaiFENesin (MUCINEX) 600 MG extended release tablet Take 1 tablet by mouth 2 times daily as needed for Congestion  Qty: 14 tablet, Refills: 0      levoFLOXacin (LEVAQUIN) 500 MG tablet Take 1 tablet by mouth daily for 2 doses  Qty: 2 tablet, Refills: 0         CONTINUE these medications which have CHANGED    Details   predniSONE (DELTASONE) 20 MG tablet Take 2 tablets by mouth daily for 1 day  Qty: 2 tablet, Refills: 0         CONTINUE these medications which have NOT CHANGED    Details   torsemide (DEMADEX) 10 MG tablet Take 10 mg by mouth daily      acetaminophen (TYLENOL) 325 MG tablet Take by mouth every 4 hours as needed      aspirin 81 MG chewable tablet Take 81 mg by mouth daily      atorvastatin (LIPITOR) 80 MG tablet TAKE 1 TABLET BY MOUTH DAILY      budesonide-formoterol (SYMBICORT) 160-4.5 MCG/ACT AERO Inhale 2 puffs into the lungs 2 times daily      butalbital-acetaminophen-caffeine (FIORICET, ESGIC) -40 MG per tablet Take 1 tablet by mouth every 6 hours as needed      calcium carb-cholecalciferol 250-125 MG-UNIT TABS Take 1 tablet by mouth daily      cetirizine (ZYRTEC) 10 MG tablet Take 10 mg by mouth daily as needed      clopidogrel (PLAVIX) 75 MG tablet Take 75 mg by mouth daily      escitalopram (LEXAPRO) 10 MG tablet Take 10 mg by mouth daily      ezetimibe (ZETIA) 10 MG tablet Take 10 mg by mouth daily      levalbuterol (XOPENEX) 1.25 MG/3ML nebulizer solution Inhale 1.25 mg into the lungs every 4 hours as needed      methIMAzole (TAPAZOLE) 10 MG tablet Take 10 mg by mouth daily      metoprolol succinate (TOPROL XL) 100 MG extended release tablet Take 100 mg by mouth daily      nitroGLYCERIN (NITROSTAT) 0.4 MG SL tablet Place 0.4 mg under the tongue 3 times daily as needed      pantoprazole (PROTONIX) 40 MG tablet Take 40 mg by mouth 2 times daily      Roflumilast (DALIRESP) 500 MCG tablet Take 500 mcg by mouth daily      tiotropium (SPIRIVA RESPIMAT) 2.5 MCG/ACT AERS inhaler Inhale 2 puffs into the lungs daily         STOP taking these medications       azithromycin (ZITHROMAX) 500 MG tablet Comments:   Reason for Stopping:         LORazepam (ATIVAN) 0.5 MG tablet Comments:   Reason for Stopping:         oxyCODONE-acetaminophen (PERCOCET) 7.5-325 MG per tablet Comments:   Reason for Stopping:         traMADol (ULTRAM) 50 MG tablet Comments:   Reason for Stopping:         traZODone (DESYREL) 50 MG tablet Comments:   Reason for Stopping:               Procedures done this admission:  * No surgery found *    Consults this admission:  None    Echocardiogram results:  No results found for this or any previous visit. Diagnostic Imaging/Tests:   XR CHEST PORTABLE    Result Date: 6/9/2022  EXAM: Chest x-ray. INDICATION: Dyspnea. COMPARISON: Prior CT chest on February 26, 2022. TECHNIQUE: 2 frontal views of the chest were obtained. FINDINGS: The lungs are hyperexpanded, consistent with emphysematous changes on the prior CT chest. Areas of nodular consolidation in the left lower lobe are unchanged. No acute infiltrate or pulmonary edema is seen. The heart size, mediastinal contour and pulmonary vasculature are normal. There are coronary artery calcifications or stents. The thorax or pleural effusion is seen. 1. Emphysema. 2. Coronary artery disease. 3. Unchanged nodular consolidation in the left lower lobe.         Labs: Results:       BMP, Mg, Phos Recent Labs     06/10/22  1657 06/12/22  0359    140   K 4.3 3.8    106   CO2 25 26   BUN 16 11      CBC Recent Labs     06/12/22  0359   WBC 7.7   RBC 3.65*   HGB 10.0*   HCT 32.5*         LFT Recent Labs     06/12/22  0359   ALT 16   GLOB 3.8*      Cardiac Testing Lab Results   Component Value Date    BNP 2,917 03/25/2022    BNP 4,033 03/21/2022    BNP 21,522 02/09/2022      Coagulation Tests Lab Results   Component Value Date    INR 1.0 03/15/2022    INR 1.1 05/29/2019    APTT 27.8 03/15/2022    APTT 28.8 02/06/2022      A1c No results found for: HBA1C   Lipid Panel Lab Results   Component Value Date    CHOL 151 07/01/2019    HDL 65 07/01/2019      Thyroid Panel Lab Results   Component Value Date    TSH 0.017 03/16/2022    TSH 0.101 02/07/2022        Most Recent UA No results found for: MUCUS, UCOM       All Labs from Last 24 Hrs:  Recent Results (from the past 24 hour(s))   Comprehensive Metabolic Panel w/ Reflex to MG    Collection Time: 06/12/22  3:59 AM   Result Value Ref Range    Sodium 140 136 - 145 mmol/L    Potassium 3.8 3.5 - 5.1 mmol/L    Chloride 106 98 - 107 mmol/L    CO2 26 21 - 32 mmol/L    Anion Gap 8 7 - 16 mmol/L    Glucose 95 65 - 100 mg/dL    BUN 11 8 - 23 MG/DL    CREATININE 0.70 0.6 - 1.0 MG/DL    GFR African American >60 >60 ml/min/1.73m2    GFR Non- >60 >60 ml/min/1.73m2    Calcium 9.0 8.3 - 10.4 MG/DL    Total Bilirubin 0.4 0.2 - 1.1 MG/DL    ALT 16 12 - 65 U/L    AST 13 (L) 15 - 37 U/L    Alk Phosphatase 67 50 - 136 U/L    Total Protein 6.6 6.3 - 8.2 g/dL    Albumin 2.8 (L) 3.2 - 4.6 g/dL    Globulin 3.8 (H) 2.3 - 3.5 g/dL    Albumin/Globulin Ratio 0.7 (L) 1.2 - 3.5     CBC with Auto Differential    Collection Time: 06/12/22  3:59 AM   Result Value Ref Range    WBC 7.7 4.3 - 11.1 K/uL    RBC 3.65 (L) 4.05 - 5.2 M/uL    Hemoglobin 10.0 (L) 11.7 - 15.4 g/dL    Hematocrit 32.5 (L) 35.8 - 46.3 %    MCV 89.0 79.6 - 97.8 FL    MCH 27.4 26.1 - 32.9 PG    MCHC 30.8 (L) 31.4 - 35.0 g/dL    RDW 14.3 11.9 - 14.6 %    Platelets 902 347 - 303 K/uL    MPV 9.4 9.4 - 12.3 FL    nRBC 0.00 0.0 - 0.2 K/uL    Differential Type AUTOMATED      Seg Neutrophils 69 43 - 78 %    Lymphocytes 20 13 - 44 %    Monocytes 10 4.0 - 12.0 %    Eosinophils % 1 0.5 - 7.8 %    Basophils 0 0.0 - 2.0 %    Immature Granulocytes 0 0.0 - 5.0 %    Segs Absolute 5.3 1.7 - 8.2 K/UL    Absolute Lymph # 1.6 0.5 - 4.6 K/UL    Absolute Mono # 0.8 0.1 - 1.3 K/UL    Absolute Eos # 0.0 0.0 - 0.8 K/UL    Basophils Absolute 0.0 0.0 - 0.2 K/UL    Absolute Immature Granulocyte 0.0 0.0 - 0.5 K/UL       Allergies   Allergen Reactions    Promethazine Nausea And Vomiting and Other (See Comments)     Severe vomiting      Immunization History   Administered Date(s) Administered    COVID-19, Pfizer Purple top, DILUTE for use, 12+ yrs, 30mcg/0.3mL dose 01/28/2021, 02/18/2021, 09/25/2021    Influenza Virus Vaccine 10/01/2012, 09/27/2013, 10/10/2021    Influenza, High Dose (Fluzone 65 yrs and older) 09/23/2014, 10/08/2015, 10/10/2016, 09/26/2017, 09/25/2018    Influenza, High-dose, Charlee Galeano, 65 yrs +, IM (Fluzone) 10/06/2020, 09/14/2021    Influenza, Triv, inactivated, subunit, adjuvanted, IM (Fluad 65 yrs and older) 09/20/2019    PPD Test 02/04/2022, 02/27/2022    Pneumococcal Conjugate 13-valent ([id number]) 09/23/2014    Pneumococcal Polysaccharide ([id number]) 01/01/2008, 09/23/2014, 08/30/2018    Tdap (Boostrix, Adacel) 11/07/2018    Zoster Recombinant (Shingrix) 03/27/2019, 06/18/2019       Recent Vital Data:  Patient Vitals for the past 24 hrs:   Temp Pulse Resp BP SpO2   06/12/22 0745 99.5 °F (37.5 °C) 96 17 (!) 142/75 93 %   06/12/22 0706 -- 92 20 -- 94 %   06/12/22 0302 99 °F (37.2 °C) 73 20 131/68 96 %   06/11/22 2359 97.7 °F (36.5 °C) 72 18 (!) 140/62 97 %   06/11/22 2004 -- 74 18 -- 95 %   06/11/22 1919 98.6 °F (37 °C) 78 18 (!) 166/76 91 %   06/11/22 1435 97.9 °F (36.6 °C) 79 14 (!) 165/77 96 %   06/11/22 1223 97.9 °F (36.6 °C) 94 16 (!) 156/79 90 %   06/11/22 1112 -- 83 17 -- 96 %       Oxygen Therapy  SpO2: 93 %  Pulse Oximeter Device Mode: Intermittent  Pulse Oximeter Device Location: Finger  O2 Device: Nasal cannula  O2 Flow Rate (L/min): 1 L/min    Estimated body mass index is 21.29 kg/m² as calculated from the following:    Height as of this encounter: 5' 8\" (1.727 m). Weight as of this encounter: 140 lb (63.5 kg). Intake/Output Summary (Last 24 hours) at 6/12/2022 1041  Last data filed at 6/11/2022 1423  Gross per 24 hour   Intake 240 ml   Output --   Net 240 ml         Physical Exam:  General:    Well nourished. No overt distress  Head:  Normocephalic, atraumatic  Eyes:  Sclerae appear normal.  Pupils equally round. HENT:  Nares appear normal, no drainage. Moist mucous membranes  Neck:  No restricted ROM. Trachea midline  CV:   RRR. No m/r/g. No JVD  Lungs:   Decreased breath sounds bilaterally. No wheezing, rhonchi, or rales. Even, unlabored  Abdomen:   Soft, nontender, nondistended. Extremities: Warm and dry. No cyanosis or clubbing. No edema. Skin:     No rashes.   Normal coloration  Neuro:  CN II-XII grossly intact. Psych:  Normal mood and affect. Signed:  Simon Sen MD    Part of this note may have been written by using a voice dictation software. The note has been proof read but may still contain some grammatical/other typographical errors.

## 2022-06-13 ENCOUNTER — TELEPHONE (OUTPATIENT)
Dept: PULMONOLOGY | Age: 72
End: 2022-06-13

## 2022-06-13 RX ORDER — PREDNISONE 20 MG/1
20 TABLET ORAL DAILY
Qty: 15 TABLET | Refills: 0 | Status: SHIPPED | OUTPATIENT
Start: 2022-06-13 | End: 2022-07-18

## 2022-06-13 NOTE — TELEPHONE ENCOUNTER
Patient just was discharged from hospital 6/12/22 with copd excarbation . Says they had her on Prednisone  40mg and sent her home with no step down dosage . Has only 2 days left of antibiotics .  Had ct scan while in hospital she knows rebecca results and wants to talk with Edward Dale     #[phone]

## 2022-06-13 NOTE — TELEPHONE ENCOUNTER
Spoke with Dr. Galina Harrell directly, prescribe normal steroid taper to allow patient tapering dose. Advised patient of Rx. She requests a f/u in our office sooner than planned August f/u; would only need an hour's notice for any cancellations. Would like to know MD impression of CT scan.

## 2022-06-14 LAB
BACTERIA SPEC CULT: NORMAL
SERVICE CMNT-IMP: NORMAL

## 2022-06-15 ENCOUNTER — PATIENT MESSAGE (OUTPATIENT)
Dept: PULMONOLOGY | Age: 72
End: 2022-06-15

## 2022-06-16 NOTE — TELEPHONE ENCOUNTER
CT scan shows enlarging LLL mass that was present on scan in February (which we did not know about). Convert images to super D and then bring her in to see me on 6/30/22 in a tumor board spot at 11:30. Needs PET scan ASAP    Please have Dr. Shawna Catherine or Dr. Catarina Marvin to review images when they can to determine best approach for biopsy.

## 2022-06-16 NOTE — TELEPHONE ENCOUNTER
From: Vicki Kwan  Sent: 6/16/2022 1:31 PM EDT  To: [facility]  Subject: Recent hospitalization    Have you had a chance to review the CT scan?

## 2022-06-17 ENCOUNTER — TELEPHONE (OUTPATIENT)
Dept: PULMONOLOGY | Age: 72
End: 2022-06-17

## 2022-06-17 DIAGNOSIS — R91.8 LUNG MASS: Primary | ICD-10-CM

## 2022-06-17 NOTE — TELEPHONE ENCOUNTER
Pt requesting review of CT again. Per [facility]-n-Share Message:    \"I saw where there was pneumonia. I'm on Prednisone and my profalactive azithromycin. Do I need anything else? \"    I have explained that Ryan Ahmadi NP will not be in the office until Monday.

## 2022-06-19 NOTE — TELEPHONE ENCOUNTER
No further antibiotics are needed at this time. Needs hospital follow up with us in 3-4 weeks . Other part of my plan is unchanged. Thanks.

## 2022-06-20 NOTE — TELEPHONE ENCOUNTER
I have spoken verbally with Dr Kasey Nation who approves appointment with him on 6/28 for further discussion. PET scan is on 6/24. I have spoken with patient she is agreeable to moving appointment to 6/28 at 1340 to speak with Dr Kasey Nation directly. Mrs Moses Reap aware.

## 2022-06-23 NOTE — PROGRESS NOTES
Physician Progress Note      PATIENT:               Zo Keith  CSN #:                  [id number]  :                       1950  ADMIT DATE:       2022 4:39 AM  DISCH DATE:        2022 1:06 PM  RESPONDING  PROVIDER #:        Mayda Frank DO          QUERY TEXT:    Patient was admitted with a COPD exacerbation. Progress notes stated, \"CT   chest  Newly visualized interstitial opacities with upper lobes right greater   than left most likely infectious. Patient will continue antibiotics, Levaquin,   until end of course. \" Patient was treated with IV Zithromax then p.o. Levaquin. After study, was this patient suspected to have: The medical record reflects the following:  Risk Factors: 66 yr, Hx COPD, pulmonary nodule  Clinical Indicators:  Respiratory panel negative for growth, Ct chest showed   Newly visualized interstitial opacities with upper lobes right greater than   left most likely infectious. Stable left lower lobe groundglass opacity. Treatment: IV Zithromax, discharged on PO Levaquin, pulmonology consult        [email or web address]  Options provided:  -- Pneumonia  -- Abnormal radiology finding only  -- Other - I will add my own diagnosis  -- Disagree - Not applicable / Not valid  -- Disagree - Clinically unable to determine / Unknown  -- Refer to Clinical Documentation Reviewer    PROVIDER RESPONSE TEXT:    This patient has pneumonia. Query created by: Shaila Beard on 2022 1:53 PM      QUERY TEXT:    Patient admitted with COPD exacerbation. Noted documentation of acute   respiratory failure in H&P and d/c summary. In order to support the diagnosis   of acute respiratory failure, please include additional clinical indicators in   your documentation. Or please document if the diagnosis of acute respiratory   failure has been ruled out after further study.     The medical record reflects the following:  Risk Factors: 66 yr, Hx Severe COPD, HTn, Home oxygen at 1L n/c    Clinical Indicators: per documentation on H&P Bradypnea and accessory muscle   usage present. No respiratory distress, highest RR documented 22, lowest sat   92% on 1L n/c, per documentation  POTTS with any movement    Treatment: supplemental oxygen 1Ln/c per flowsheet,  pulmonary toilet        Acute Respiratory Failure Clinical Indicators per [employer] Training Guide and   Quick Reference Guide:  pO2 < 60 mmHg  pCO2 > 50 and pH < 7.35  P/F ratio (pO2 / FIO2) < 300  pO2 decrease or pCO2 increase by 10 mmHg from baseline (if known)  Supplemental oxygen of 40% or more  Presence of respiratory distress, tachypnea, dyspnea, shortness of breath,   wheezing  Unable to speak in complete sentences  Use of accessory muscles to breathe  Extreme anxiety and feeling of impending doom  Tripod position  Confusion/altered mental status/obtunded    [email or web address]  Options provided:  -- Acute Respiratory Failure as evidenced by, Please document evidence. -- Acute Respiratory Failure ruled out after study and Chronic Respiratory   Failure confirmed  -- Other - I will add my own diagnosis  -- Disagree - Not applicable / Not valid  -- Disagree - Clinically unable to determine / Unknown  -- Refer to Clinical Documentation Reviewer    PROVIDER RESPONSE TEXT:    Acute Respiratory Failure ruled out after study and Chronic Respiratory   Failure confirmed.     Query created by: Mone Quezada on 6/16/2022 2:09 PM      Electronically signed by:  Grupo Holley DO 6/23/2022 3:06 PM

## 2022-06-24 ENCOUNTER — TELEPHONE (OUTPATIENT)
Dept: PULMONOLOGY | Age: 72
End: 2022-06-24

## 2022-06-24 ENCOUNTER — HOSPITAL ENCOUNTER (OUTPATIENT)
Dept: PET IMAGING | Age: 72
Discharge: HOME | End: 2022-06-27
Payer: MEDICARE

## 2022-06-24 DIAGNOSIS — R91.8 LUNG MASS: ICD-10-CM

## 2022-06-24 LAB
GLUCOSE BLD STRIP.AUTO-MCNC: 98 MG/DL (ref 65–100)
SERVICE CMNT-IMP: NORMAL

## 2022-06-24 PROCEDURE — 78815 PET IMAGE W/CT SKULL-THIGH: CPT

## 2022-06-24 PROCEDURE — 2580000003 HC RX 258: Performed by: NURSE PRACTITIONER

## 2022-06-24 PROCEDURE — A9552 F18 FDG: HCPCS | Performed by: NURSE PRACTITIONER

## 2022-06-24 PROCEDURE — 6360000004 HC RX CONTRAST MEDICATION: Performed by: NURSE PRACTITIONER

## 2022-06-24 PROCEDURE — 3430000000 HC RX DIAGNOSTIC RADIOPHARMACEUTICAL: Performed by: NURSE PRACTITIONER

## 2022-06-24 PROCEDURE — 82962 GLUCOSE BLOOD TEST: CPT

## 2022-06-24 RX ORDER — SODIUM CHLORIDE 0.9 % (FLUSH) 0.9 %
10 SYRINGE (ML) INJECTION ONCE AS NEEDED
Status: COMPLETED | OUTPATIENT
Start: 2022-06-24 | End: 2022-06-24

## 2022-06-24 RX ORDER — FLUDEOXYGLUCOSE F 18 200 MCI/ML
12.28 INJECTION, SOLUTION INTRAVENOUS
Status: COMPLETED | OUTPATIENT
Start: 2022-06-24 | End: 2022-06-24

## 2022-06-24 RX ADMIN — FLUDEOXYGLUCOSE F 18 12.28 MILLICURIE: 200 INJECTION, SOLUTION INTRAVENOUS at 07:53

## 2022-06-24 RX ADMIN — SODIUM CHLORIDE, PRESERVATIVE FREE 10 ML: 5 INJECTION INTRAVENOUS at 07:53

## 2022-06-24 RX ADMIN — DIATRIZOATE MEGLUMINE AND DIATRIZOATE SODIUM 10 ML: 660; 100 LIQUID ORAL; RECTAL at 07:53

## 2022-06-24 NOTE — TELEPHONE ENCOUNTER
Patient has fever of 100.2, other symptoms include SOB, Please advise. She needs to know what she should do?

## 2022-06-24 NOTE — TELEPHONE ENCOUNTER
Mychart message from pt. Sakina Selby, YISEL - CNP Yesterday (10:08 AM)     May i please have a refill? Thank you.

## 2022-06-27 NOTE — TELEPHONE ENCOUNTER
Contacted patient regarding low grade fever & sob; fever responsive to OTC med; continues w/ sob, difficult to get to bathroom, increased O2 to 2.5L and able to keep saturations in mid 90s, taking mucinex extra strength BID. Has appt tomorrow & will keep appt in office; continues to take 10mg prednisone daiy & BID tramadol.

## 2022-06-27 NOTE — TELEPHONE ENCOUNTER
MyChart msg sent over to telephone encounter, had in subject line for tramadol. Please advise regarding refills. Has office appt 6/28/22.      Last Rx on file 8/13/21 historical provider, per  aware 2/24/22 by JAMES Vanegas    [place]  Aware hx as follows:

## 2022-06-28 ENCOUNTER — OFFICE VISIT (OUTPATIENT)
Dept: PULMONOLOGY | Age: 72
End: 2022-06-28
Payer: MEDICARE

## 2022-06-28 VITALS
SYSTOLIC BLOOD PRESSURE: 120 MMHG | HEIGHT: 67 IN | BODY MASS INDEX: 21.66 KG/M2 | DIASTOLIC BLOOD PRESSURE: 80 MMHG | OXYGEN SATURATION: 93 % | HEART RATE: 85 BPM | WEIGHT: 138 LBS | RESPIRATION RATE: 20 BRPM | TEMPERATURE: 98 F

## 2022-06-28 DIAGNOSIS — R59.0 MEDIASTINAL LYMPHADENOPATHY: ICD-10-CM

## 2022-06-28 DIAGNOSIS — J43.2 CENTRILOBULAR EMPHYSEMA (HCC): ICD-10-CM

## 2022-06-28 DIAGNOSIS — J96.11 CHRONIC RESPIRATORY FAILURE WITH HYPOXIA (HCC): ICD-10-CM

## 2022-06-28 DIAGNOSIS — R91.8 LUNG MASS: Primary | ICD-10-CM

## 2022-06-28 DIAGNOSIS — J44.9 COPD, VERY SEVERE (HCC): ICD-10-CM

## 2022-06-28 PROBLEM — R59.9 ADENOPATHY: Status: ACTIVE | Noted: 2022-01-01

## 2022-06-28 PROCEDURE — G8427 DOCREV CUR MEDS BY ELIG CLIN: HCPCS | Performed by: INTERNAL MEDICINE

## 2022-06-28 PROCEDURE — 1111F DSCHRG MED/CURRENT MED MERGE: CPT | Performed by: INTERNAL MEDICINE

## 2022-06-28 PROCEDURE — 1036F TOBACCO NON-USER: CPT | Performed by: INTERNAL MEDICINE

## 2022-06-28 PROCEDURE — 3023F SPIROM DOC REV: CPT | Performed by: INTERNAL MEDICINE

## 2022-06-28 PROCEDURE — 1090F PRES/ABSN URINE INCON ASSESS: CPT | Performed by: INTERNAL MEDICINE

## 2022-06-28 PROCEDURE — G8400 PT W/DXA NO RESULTS DOC: HCPCS | Performed by: INTERNAL MEDICINE

## 2022-06-28 PROCEDURE — 99214 OFFICE O/P EST MOD 30 MIN: CPT | Performed by: INTERNAL MEDICINE

## 2022-06-28 PROCEDURE — 3017F COLORECTAL CA SCREEN DOC REV: CPT | Performed by: INTERNAL MEDICINE

## 2022-06-28 PROCEDURE — G8420 CALC BMI NORM PARAMETERS: HCPCS | Performed by: INTERNAL MEDICINE

## 2022-06-28 PROCEDURE — 1123F ACP DISCUSS/DSCN MKR DOCD: CPT | Performed by: INTERNAL MEDICINE

## 2022-06-28 RX ORDER — TRAMADOL HYDROCHLORIDE 50 MG/1
50 TABLET ORAL EVERY 6 HOURS PRN
COMMUNITY
End: 2022-07-06 | Stop reason: SDUPTHER

## 2022-06-28 RX ORDER — TRAMADOL HYDROCHLORIDE 50 MG/1
50 TABLET ORAL EVERY 6 HOURS PRN
Status: CANCELLED | OUTPATIENT
Start: 2022-06-28

## 2022-06-28 RX ORDER — LORAZEPAM 0.5 MG/1
0.5 TABLET ORAL 3 TIMES DAILY PRN
Qty: 12 TABLET | Refills: 0 | Status: SHIPPED | OUTPATIENT
Start: 2022-06-28 | End: 2022-07-28

## 2022-06-28 ASSESSMENT — ENCOUNTER SYMPTOMS
COUGH: 0
DIARRHEA: 0
VOMITING: 0
WHEEZING: 0
ABDOMINAL PAIN: 0
CHEST TIGHTNESS: 0
CONSTIPATION: 0
SHORTNESS OF BREATH: 1
NAUSEA: 0
APNEA: 0

## 2022-06-28 NOTE — PATIENT INSTRUCTIONS
Patient Education        Bronchoscopy: Before Your Procedure  What is bronchoscopy? Bronchoscopy (say \"bron-KOSS-koh-pee\") is a type of procedure. Your doctor uses a flexible tube to look at your airway. This tube is called a bronchoscope. It lets your doctor see your throat, voice box (larynx), windpipe (trachea), andbronchial tubes. There are many reasons to have this procedure. Your doctor may look for problems with your airway. Or he or she may remove an object or growth. Maryann Page could also take a sample of tissue to study. This is called a biopsy. You will probably be awake for the procedure. But you will get medicine so you will not have pain. The doctor puts the bronchoscope into your mouth or noseand down your throat. Most people go home the same day. You will probably be able to go back to workor your normal routine in 1 or 2 days. Follow-up care is a key part of your treatment and safety. Be sure to make and go to all appointments, and call your doctor if you are having problems. It's also a good idea to know your test results and keep alist of the medicines you take. How do you prepare for the procedure? Procedures can be stressful. This information will help you understand what youcan expect. And it will help you safely prepare for your procedure. Preparing for the procedure     Your doctor will tell you how soon before the procedure to stop eating and drinking. Follow the instructions exactly about when to stop eating and drinking, or your procedure may be canceled.  Be sure you have someone to take you home. Anesthesia and pain medicine will make it unsafe for you to drive or get home on your own.      Understand exactly what procedure is planned, along with the risks, benefits, and other options.      Tell your doctor ALL the medicines, vitamins, supplements, and herbal remedies you take. Some may increase the risk of problems during your procedure.  Your doctor will tell you if you should stop taking any of them before the procedure and how soon to do it.      If you take aspirin or some other blood thinner, ask your doctor if you should stop taking it before your procedure. Make sure that you understand exactly what your doctor wants you to do. These medicines increase the risk of bleeding.      Make sure your doctor and the hospital have a copy of your advance directive. If you don't have one, you may want to prepare one. It lets others know your health care wishes. It's a good thing to have before any type of surgery or procedure. What happens on the day of the procedure?  Follow the instructions exactly about when to stop eating and drinking. If you don't, your procedure may be canceled. If your doctor told you to take your medicines on the day of the procedure, take them with only a sip of water.      Take a bath or shower before you come in for your procedure. Do not apply lotions, perfumes, deodorants, or nail polish.      Take off all jewelry and piercings. And take out contact lenses, if you wear them. At the hospital or surgery center    Bring a picture ID.      You will be kept comfortable and safe by your anesthesia provider. The anesthesia may make you sleep. Or it may just numb the area being worked on.      The procedure will take about 30 to 60 minutes.      You will be in recovery for 1 to 3 hours after the procedure. When should you call your doctor?  You have questions or concerns.      You don't understand how to prepare for your procedure.      You become ill before the procedure (such as fever, flu, or a cold).      You need to reschedule or have changed your mind about having the procedure. Where can you learn more? Go to [email or web address] and sign in to your [employer] account. Enter K372 in the [employer] box to learn more about \"Bronchoscopy: Before Your Procedure. \"     If you do not have an account, please click on the \"Sign Up Now\" link. Current as of: March 9, 2022               Content Version: 13.3  © 2006-2022 Healthwise, [employer]. Care instructions adapted under license by [facility] ([facility]). If you have questions about a medical condition or this instruction, always ask your healthcare professional. [address] any warranty or liability for your use of this information. You are scheduled for bronchoscopy on 7/13/2022 with Dr Jose Alejandro Santos. Please arrive at main admitting office at [facility] for this procedure. Address for check in is [address] Not eat or drink anything after midnight the night before procedure. You must have an adult with you to drive you home. Please hold you Plavix after 7/7/2022 dose. Call [phone] with any questions.

## 2022-06-28 NOTE — PROGRESS NOTES
[address] [facility]. [address], [address] [facility]  [phone]    Patient Name:  Arsenio De Oliveira [facility]  YOB: 1950    Office Visit 6/28/2022    CHIEF COMPLAINT:    Chief Complaint   Patient presents with    COPD         HISTORY OF PRESENT ILLNESS:    I had the pleasure of seeing Ms. Feng Burnett in our clinic today. Ms. [facility] JENNIFER is a 66 y.o. female who presents with a history of very severe COPD with centrilobular emphysema, on nocturnal O2.      She had a navigation bronchoscopy with attempts at biopsy of a JHONY nodule 2/11/20 which was difficult to visualize with radial probe and ultimately was non diagnostic. PET scan showed this area to have an SUV of 2.1. She was to have a bronchoscopy attempt at [facility] in July but repeat imaging performed here showed improvement in this area so the bronchoscopy has been cancelled. Was referred to [facility] for eval for lung transplant ultimately felt not to be a lung transplant candidate. Was then seen by Dr Jose Garcia with [address] and also felt not to be a transplant candidate. She returns today for follow up. Since her last appointment she was hospitalized from 6/9-6/12 with acute on chronic respiratory failure. During that stay CT chest was done and showed increase in LLL mass with L hilar lymph node enlargement. PET scan was done 6/24 showing 4.4 x 2.3 cm LLL mass with SUV 11.2. L hilar and subcarinal LAD was seen. She states even since discharge her breathing is still rough. Able to walk to the bathroom. Says she was not directed on how much O2 to use out of the hospital. Has been using 2L O2 at all times. On that her lowest sat at home is 88%. Using symbicort 160 2 puffs bid, spiriva, daliresp, prn albuterol. She tells me she has been running temps to 100.5.        Past Medical History:   Diagnosis Date    Acute respiratory failure with hypoxia ([address] 8/22/2013    Asthma     CAD (coronary artery disease)     Chest pain 5/31/2019    Closed right hip fracture (HCC) 2/4/2022    COPD with acute lower respiratory infection ([address] 2/10/2022    EKG, abnormal 2/15/2022    Femur fracture, right ([address] 2/11/2022    Multiple closed anterior-posterior compression fractures of pelvis ([address] 2/2/2022    Other fracture of right femur, initial encounter for closed fracture ([address] 2/4/2022    Post-traumatic subdural hematoma ([address] 3/15/2022    SDH (subdural hematoma) ([address] 3/18/2022       Patient Active Problem List   Diagnosis    Essential hypertension, benign    Ventricular ectopy    Iron deficiency anemia due to chronic blood loss    Carotid stenosis, right    Carotid stenosis    Right ear pain    Pulmonary mass    Chronic anxiety    Coronary artery disease involving native coronary artery of native heart without angina pectoris    Chronic respiratory failure with hypoxia ([address]    Personal history of tobacco use    COPD, very severe ([address]    History of MI (myocardial infarction)    H/O carotid endarterectomy    DNR (do not resuscitate)    Acute bilateral low back pain without sciatica    Centrilobular emphysema ([address]    CAD S/P percutaneous coronary angioplasty    Palliative care patient    Ischemic heart disease    Hypertensive urgency    Hyperlipidemia    Hypoxemia    Moderate to severe mitral regurgitation    Hypokalemic alkalosis    Depression    DDD (degenerative disc disease), lumbar    Anxiety    Acute on chronic respiratory failure ([address]       History reviewed. No pertinent surgical history.     [unfilled]    Social History     Socioeconomic History    Marital status:      Spouse name: Not on file    Number of children: Not on file    Years of education: Not on file    Highest education level: Not on file   Occupational History    Not on file   Tobacco Use    Smoking status: Former Smoker     Packs/day: 0.50     Years: 50.00     Pack years: 25.00     Types: Cigarettes     Quit date: 2017     Years since quittin.4    Smokeless tobacco: Never Used   Substance and Sexual Activity    Alcohol use: Not on file    Drug use: Not on file    Sexual activity: Not on file   Other Topics Concern    Not on file   Social History Narrative    Not on file     Social Determinants of Health     Financial Resource Strain:     Difficulty of Paying Living Expenses: Not on file   Food Insecurity:     Worried About Running Out of Food in the Last Year: Not on file    Rsoeline of Food in the Last Year: Not on file   Transportation Needs:     Lack of Transportation (Medical): Not on file    Lack of Transportation (Non-Medical):  Not on file   Physical Activity:     Days of Exercise per Week: Not on file    Minutes of Exercise per Session: Not on file   Stress:     Feeling of Stress : Not on file   Social Connections:     Frequency of Communication with Friends and Family: Not on file    Frequency of Social Gatherings with Friends and Family: Not on file    Attends Gnosticism Services: Not on file    Active Member of [address] [place] [address] or Organizations: Not on file    Attends Club or Organization Meetings: Not on file    Marital Status: Not on file   Intimate Partner Violence:     Fear of Current or Ex-Partner: Not on file    Emotionally Abused: Not on file    Physically Abused: Not on file    Sexually Abused: Not on file   Housing Stability:     Unable to Pay for Housing in the Last Year: Not on file    Number of [place] in the Last Year: Not on file    Unstable Housing in the Last Year: Not on file         Family History   Adopted: Yes   Problem Relation Age of Onset    No Known Problems Mother     No Known Problems Father        Allergies   Allergen Reactions    Promethazine Nausea And Vomiting and Other (See Comments)     Severe vomiting        Current Outpatient Medications   Medication Sig    traMADol (ULTRAM) 50 MG tablet Take 50 mg by mouth every 6 hours as needed wheezing. Cardiovascular: Negative for chest pain, palpitations and leg swelling. Gastrointestinal: Negative for abdominal pain, constipation, diarrhea, nausea and vomiting. Neurological: Positive for dizziness. Negative for tremors, seizures, weakness and headaches. PHYSICAL EXAM:  Vitals:    06/28/22 1355   BP: 120/80   Pulse: 85   Resp: 20   Temp: 98 °F (36.7 °C)   SpO2: 93%       GENERAL APPEARANCE:  The patient is normal weight and in no respiratory distress. HEENT:  PERRL. Conjunctivae unremarkable. NECK/LYMPHATIC:  Symmetrical with no elevation of jugular venous pulsation. Trachea midline. No thyroid enlargement. No cervical adenopathy. LUNGS:  Normal respiratory effort with symmetrical lung expansion. Breath sounds are decreased but clear. HEART:  There is a regular rate and rhythm. No murmur, rub, or gallop. ABDOMEN:  Soft and non-tender. No hepatosplenomegaly. Bowel sounds are normal.    NEURO/PSYCH:  The patient is alert and oriented to person, place, and time. Memory appears intact and mood is normal.  No gross sensorimotor deficits are present. MS/SKIN:  There is no edema in the lower extremities. No rashes, bruises, lesions, ulcers visible. DIAGNOSTIC TESTS:  CXR:    XR CHEST (2 VW) 09/03/2021    Narrative  History: [facility]    Two views chest    COMPARISON: 7/6/2021    Findings: COPD changes noted. The lungs are well expanded and clear. The cardiac  silhouette, and mediastinal contour, and osseous structures are stable. Impression  Stable exam with persistent COPD change. CT WITHOUT CONTRAST:    CT CHEST WO CONTRAST 10/08/2021    Narrative  CT CHEST without INTRAVENOUS CONTRAST. INDICATION: Pulmonary nodule, follow-up. COMPARISON: September 2021 and March 2021    TECHNIQUE:  1mm and 5 mm axial scans from the apices through the diaphragms  without contrast. Radiation dose reduction techniques were used for this study.   Our CT scanners use one or more of the following:  Automated exposure control,  adjustment of the mA and or kV according to patient size, iterative  reconstruction. FINDINGS:  LUNGS: Clustered nodules in the left lower lobe laterally, axial image number  36. These appear stable. There is no focal area of similar density left lower  lobe, axial image number 31 possibly tree in bud nodularity. More linear stable  density left upper lobe, best seen on coronal image 58 also stable No new  pulmonary nodules. Diffuse airspace dilation consistent with emphysema. No  airspace disease. AIRWAYS: Trachea and proximal bronchi grossly patent. PLEURA: No effusion or thickening or calcifications. LYMPH NODES: No enlarged axillary, hilar or mediastinal lymph nodes. HEART: Normal size. CORONARIES: Extensive dense calcifications. UPPER ABDOMEN: Normal size adrenal glands. SKELETAL/CHEST WALL: DJD, otherwise no gross bony lesions. Impression  Stable nodular densities left upper lobe and lower lobes. Emphysema. Follow-up as clinically indicated. CT WITH CONTRAST:    No results found for this or any previous visit from the past 365 days. CT HIGH RES:    No results found for this or any previous visit from the past 365 days. CT PE PROTOCOL:    CT CHEST PULMONARY EMBOLISM W CONTRAST 06/11/2022    Narrative  CT chest with contrast (pulmonary embolism protocol)    History: COPD with acute exacerbation    Technique: Helically acquired images were obtained from the lung apices to the  domes of the diaphragms reconstructed at 2.5mm intervals after the uneventful  administration of 100 cc's intravenous Isovue-370 in order to evaluate the  pulmonary arteries. Coronal and sagittal reformatted images were submitted.     Radiation dose reduction techniques were used for this study:  Our CT scanners  use one or all of the following: Automated exposure control, adjustment of the  mA and/or kVp according to patient's size, iterative reconstruction. Comparison: 02/26/2022    Findings: There is no pleural or pericardial effusion. The heart and great  vessels are unremarkable. There are no filling defects within the pulmonary  arteries to suggest pulmonary emboli. Moderate emphysematous changes are  present. Patchy interstitial opacity is present within the right upper lobe  anteriorly. Similar but less impressive findings are newly visualized within the  left upper lobe anteriorly. Again noted is the ill-defined masslike opacity within the left lower lobe  measuring approximately 2.0 x 3.6 cm previously measured at approximately 1.7 x  2.8 cm. Also is a ground glass opacity within the left upper lobe measuring 1.5  cm, unchanged when remeasured on the previous study. A left hilar lymph node  measures 1.4 cm in short axis, unchanged. There is a small left Bochdalek hernia containing fat. Imaging of the upper  abdomen is unremarkable. There are compression fractures within the thoracic and  lumbar spine with mild height loss. The fractures at T10 and T11 has occurred in  the interim. Impression  1. No evidence of pulmonary embolism. 2. Interval increase in left lower lobe mass suspicious for bronchogenic  carcinoma. A left hilar lymph node also is enlarged concerning for metastatic  adenopathy. 3. Newly visualized interstitial opacities within the upper lobes, right greater  than left, most likely infectious. 4. Stable left lower lobe groundglass opacity. 5. Interval compression fractures with mild height loss at T10 and T11. LDCT SCREENING:    No results found for this or any previous visit from the past 365 days. PET SCAN:    PET CT SKULL BASE TO MID THIGH 06/24/2022    Narrative  PET/CT: 6/24/2022    INDICATION: Initial diagnosis of lung mass.     TECHNIQUE: After oral administration of gastroview and intravenous  administration of 12.28 mCi of F18 FDG, noncontrast CT images were obtained for  attenuation correction and for fusion with emission PET images. A series of  overlapping emission PET images were then obtained beginning 60 minutes after  injection of FDG. The area imaged spanned the region from the skull base to the  mid thighs. All CT scans performed at this facility use one or all of the  following: Automated exposure control, adjustment of the mA and/or kVp according  to patient's size, iterative reconstruction. COMPARISON: CT chest 6/11/2022    FINDINGS:  NECK/CHEST:  No suspicious activity is seen in the neck. Only minimal nonfocal activity is  seen about left posterior facets in the mid cervical spine likely related to  benign degenerative changes. No clear aggressive osseous lesion is suggested on  limited CT images. The most clear suspicious activity is seen associated with a large heterogeneous  lesion in the left lower lobe demonstrating spiculated appearing components best  appreciated on axial image 92 measuring 4.4 cm x 2.3 cm in greatest transverse  dimensions and with a maximum SUV of 11.2 g/mL. Malignancy is not excluded given  the appearance. Additional focal appearing activity is seen in the left hilum on  pet/CT image 85 with a maximum SUV of 9.1 g/mL and in the subcarinal mediastinum  on axial image 84 with a maximum SUV of 6.3 g/mL concerning for left hilar and  subcarinal mediastinal diego metastases, respectively. Additional mild activity  is seen in the mediastinum and left hilum on pet/CT image 76 which does appear  to correspond to small prevascular and left hilar lymph nodes seen on axial CT  image 39 of the prior the chest dated 6/11/2022 which are suspicious for  additional early diego metastases. No clearly suspicious activity is otherwise  seen in the chest. Mild to moderate activity is seen in the medial right upper  lobe on pet/CT image 56 with a maximum SUV of 4.8 g/mL.  Although the degree of  activity is suspicious, this appears to correspond to a fluid containing cystic  structure in the right upper lobe with a CT appearance being more suggestive of  an inflammatory process. Additional soft tissue activity is seen over the  lateral scapula which is most consistent with benign muscle activity. ABDOMEN/PELVIS:  The only potentially suspicious activity is skin activity involving the left  proximal medial thigh seen on pet/CT image 257 with a maximum SUV of 6.3 g/mL  which appears to be associated with focal skin thickening. This is not felt to  be related to the patient's lung cancer. However, a dermal neoplasm or focal  inflammatory process is not excluded given the appearance. This should be  further assessed with direct visualization. No suspicious activity is otherwise  seen below the diaphragms. Activity is seen adjacent to the right femoral neck. However, this appears to correspond to heterotopic bone formation from hip  surgery at this level without aggressive features suggested therefore is not  worrisome. No focal hepatic activity is seen. No contour deforming, or  hypermetabolic adrenal lesion is seen. Advanced atherosclerotic calcification is  seen of the abdominal aorta. Impression  1. Heterogeneous left lower lobe mass demonstrating irregular spiculated  components and measuring 4.4 cm x 2.3 cm in greatest transverse dimensions and  with a maximum SUV of 11.2 g/mL. Malignancy is not excluded given the  appearance. Further evaluation as clinically indicated is recommended. 2. Additional left hilar and subcarinal mediastinal activity concerning for  diego metastases. Additional mild activity is seen within a prevascular  mediastinal lymph node an additional left hilar lymph nodes for which additional  early diego metastases are not excluded. 3. Focal activity associated with focal skin thickening in the most medial,  proximal left thigh seen on pet/CT image 257.  This is unlikely to be related to  potential lung cancer although could represent an additional dermal neoplasm or  focal abnormal inflammatory process otherwise. This should be clinically  evaluated with direct visualization. This has been marked on image to assist  with clinical correlation. 4. Mild activity associated with a cystic abnormality in the medial right upper  lobe. Although the degree of activity is suspicious (maximum SUV of 4.8 g/mL)  the CT features are more consistent with a potential inflammatory process. Attention on follow-up studies is recommended. Exercise oximetry:      Spirometry:   No flowsheet data found. Echo:  No valid procedures specified. ASSESSMENT:  (Medical Decision Making)   Diagnosis Orders   1. Lung mass     2. Mediastinal lymphadenopathy     3. Chronic respiratory failure with hypoxia (HCC)     4. COPD, very severe ([address]     5. Centrilobular emphysema (HCC)          Very severe copd. Evaluated for lung transplant EBUS due to renal CA. Completed pulmonary rehab already. On maximal inhaled therapies. Now requiring 2 to 3 L of oxygen at all times. Enlarging left lower lobe mass with mediastinal and hilar lymphadenopathy concerning for primary lung cancer. She still  PLAN:  -We will set up with bronchoscopy with EBUS elevated navigation guided sampling of the left-sided abnormalities.  -Continue current COPD regimen of Symbicort, Daliresp, Spiriva, albuterol  -f/u 2 months. Inseminated can probably find her up at tumor board follow-up spot about a month or so she says she is up about 100.5 has not been that high in the last couple of days or so can see if this is of infectious but he does have a possible positive that does not look thank you thank another yes-man    F/u in 1-2 motnhs. Portions of this note were created using voice recognition software. As such, error of speech recognition may have occurred. No orders of the defined types were placed in this encounter.       No orders of the defined types were placed in this encounter.         Lonnie Napier MD  Electronically signed

## 2022-06-28 NOTE — H&P (VIEW-ONLY)
[address] [facility] [address]. [address], [address] [facility]  [phone]    Patient Name:  Fariba Whitt [facility]  YOB: 1950    Office Visit 6/28/2022    CHIEF COMPLAINT:    Chief Complaint   Patient presents with    COPD         HISTORY OF PRESENT ILLNESS:    I had the pleasure of seeing Ms. Calvin Cardoso in our clinic today. Ms. [facility] JENNIFER is a 66 y.o. female who presents with a history of very severe COPD with centrilobular emphysema, on nocturnal O2.      She had a navigation bronchoscopy with attempts at biopsy of a JHONY nodule 2/11/20 which was difficult to visualize with radial probe and ultimately was non diagnostic. PET scan showed this area to have an SUV of 2.1. She was to have a bronchoscopy attempt at [facility] in July but repeat imaging performed here showed improvement in this area so the bronchoscopy has been cancelled. Was referred to [facility] for eval for lung transplant ultimately felt not to be a lung transplant candidate. Was then seen by Dr Olga Hanson with [address] and also felt not to be a transplant candidate. She returns today for follow up. Since her last appointment she was hospitalized from 6/9-6/12 with acute on chronic respiratory failure. During that stay CT chest was done and showed increase in LLL mass with L hilar lymph node enlargement. PET scan was done 6/24 showing 4.4 x 2.3 cm LLL mass with SUV 11.2. L hilar and subcarinal LAD was seen. She states even since discharge her breathing is still rough. Able to walk to the bathroom. Says she was not directed on how much O2 to use out of the hospital. Has been using 2L O2 at all times. On that her lowest sat at home is 88%. Using symbicort 160 2 puffs bid, spiriva, daliresp, prn albuterol. She tells me she has been running temps to 100.5.        Past Medical History:   Diagnosis Date    Acute respiratory failure with hypoxia ([address] 8/22/2013    Asthma     CAD (coronary artery disease)     Chest pain 5/31/2019    Closed right hip fracture (HCC) 2/4/2022    COPD with acute lower respiratory infection ([address] 2/10/2022    EKG, abnormal 2/15/2022    Femur fracture, right ([address] 2/11/2022    Multiple closed anterior-posterior compression fractures of pelvis ([address] 2/2/2022    Other fracture of right femur, initial encounter for closed fracture ([address] 2/4/2022    Post-traumatic subdural hematoma ([address] 3/15/2022    SDH (subdural hematoma) ([address] 3/18/2022       Patient Active Problem List   Diagnosis    Essential hypertension, benign    Ventricular ectopy    Iron deficiency anemia due to chronic blood loss    Carotid stenosis, right    Carotid stenosis    Right ear pain    Pulmonary mass    Chronic anxiety    Coronary artery disease involving native coronary artery of native heart without angina pectoris    Chronic respiratory failure with hypoxia ([address]    Personal history of tobacco use    COPD, very severe ([address]    History of MI (myocardial infarction)    H/O carotid endarterectomy    DNR (do not resuscitate)    Acute bilateral low back pain without sciatica    Centrilobular emphysema ([address]    CAD S/P percutaneous coronary angioplasty    Palliative care patient    Ischemic heart disease    Hypertensive urgency    Hyperlipidemia    Hypoxemia    Moderate to severe mitral regurgitation    Hypokalemic alkalosis    Depression    DDD (degenerative disc disease), lumbar    Anxiety    Acute on chronic respiratory failure ([address]       History reviewed. No pertinent surgical history.     [unfilled]    Social History     Socioeconomic History    Marital status:      Spouse name: Not on file    Number of children: Not on file    Years of education: Not on file    Highest education level: Not on file   Occupational History    Not on file   Tobacco Use    Smoking status: Former Smoker     Packs/day: 0.50     Years: 50.00     Pack years: 25.00     Types: for Pain.  predniSONE (DELTASONE) 20 MG tablet Take 1 tablet by mouth daily 40mg x 3days; 30mg x 3 days; 20mg x 3 days; 10mg x 3 days    torsemide (DEMADEX) 10 MG tablet Take 10 mg by mouth daily    acetaminophen (TYLENOL) 325 MG tablet Take by mouth every 4 hours as needed    aspirin 81 MG chewable tablet Take 81 mg by mouth daily    atorvastatin (LIPITOR) 80 MG tablet TAKE 1 TABLET BY MOUTH DAILY    budesonide-formoterol (SYMBICORT) 160-4.5 MCG/ACT AERO Inhale 2 puffs into the lungs 2 times daily    butalbital-acetaminophen-caffeine (FIORICET, ESGIC) -40 MG per tablet Take 1 tablet by mouth every 6 hours as needed    calcium carb-cholecalciferol 250-125 MG-UNIT TABS Take 1 tablet by mouth daily    cetirizine (ZYRTEC) 10 MG tablet Take 10 mg by mouth daily as needed    clopidogrel (PLAVIX) 75 MG tablet Take 75 mg by mouth daily    escitalopram (LEXAPRO) 10 MG tablet Take 10 mg by mouth daily    ezetimibe (ZETIA) 10 MG tablet Take 10 mg by mouth daily    methIMAzole (TAPAZOLE) 10 MG tablet Take 10 mg by mouth daily    metoprolol succinate (TOPROL XL) 100 MG extended release tablet Take 100 mg by mouth daily    nitroGLYCERIN (NITROSTAT) 0.4 MG SL tablet Place 0.4 mg under the tongue 3 times daily as needed    pantoprazole (PROTONIX) 40 MG tablet Take 40 mg by mouth 2 times daily    Roflumilast (DALIRESP) 500 MCG tablet Take 500 mcg by mouth daily    tiotropium (SPIRIVA RESPIMAT) 2.5 MCG/ACT AERS inhaler Inhale 2 puffs into the lungs daily    levalbuterol (XOPENEX) 1.25 MG/3ML nebulizer solution Inhale 1.25 mg into the lungs every 4 hours as needed (Patient not taking: Reported on 6/28/2022)     No current facility-administered medications for this visit. REVIEW OF SYSTEMS:    Review of Systems   Constitutional: Positive for fever. Negative for chills, fatigue and unexpected weight change. Respiratory: Positive for shortness of breath.  Negative for apnea, cough, chest tightness and wheezing. Cardiovascular: Negative for chest pain, palpitations and leg swelling. Gastrointestinal: Negative for abdominal pain, constipation, diarrhea, nausea and vomiting. Neurological: Positive for dizziness. Negative for tremors, seizures, weakness and headaches. PHYSICAL EXAM:  Vitals:    06/28/22 1355   BP: 120/80   Pulse: 85   Resp: 20   Temp: 98 °F (36.7 °C)   SpO2: 93%       GENERAL APPEARANCE:  The patient is normal weight and in no respiratory distress. HEENT:  PERRL. Conjunctivae unremarkable. NECK/LYMPHATIC:  Symmetrical with no elevation of jugular venous pulsation. Trachea midline. No thyroid enlargement. No cervical adenopathy. LUNGS:  Normal respiratory effort with symmetrical lung expansion. Breath sounds are decreased but clear. HEART:  There is a regular rate and rhythm. No murmur, rub, or gallop. ABDOMEN:  Soft and non-tender. No hepatosplenomegaly. Bowel sounds are normal.    NEURO/PSYCH:  The patient is alert and oriented to person, place, and time. Memory appears intact and mood is normal.  No gross sensorimotor deficits are present. MS/SKIN:  There is no edema in the lower extremities. No rashes, bruises, lesions, ulcers visible. DIAGNOSTIC TESTS:  CXR:    XR CHEST (2 VW) 09/03/2021    Narrative  History: SHOB    Two views chest    COMPARISON: 7/6/2021    Findings: COPD changes noted. The lungs are well expanded and clear. The cardiac  silhouette, and mediastinal contour, and osseous structures are stable. Impression  Stable exam with persistent COPD change. CT WITHOUT CONTRAST:    CT CHEST WO CONTRAST 10/08/2021    Narrative  CT CHEST without INTRAVENOUS CONTRAST. INDICATION: Pulmonary nodule, follow-up. COMPARISON: September 2021 and March 2021    TECHNIQUE:  1mm and 5 mm axial scans from the apices through the diaphragms  without contrast. Radiation dose reduction techniques were used for this study.   Our CT scanners use one or more of the following:  Automated exposure control,  adjustment of the mA and or kV according to patient size, iterative  reconstruction. FINDINGS:  LUNGS: Clustered nodules in the left lower lobe laterally, axial image number  36. These appear stable. There is no focal area of similar density left lower  lobe, axial image number 31 possibly tree in bud nodularity. More linear stable  density left upper lobe, best seen on coronal image 58 also stable No new  pulmonary nodules. Diffuse airspace dilation consistent with emphysema. No  airspace disease. AIRWAYS: Trachea and proximal bronchi grossly patent. PLEURA: No effusion or thickening or calcifications. LYMPH NODES: No enlarged axillary, hilar or mediastinal lymph nodes. HEART: Normal size. CORONARIES: Extensive dense calcifications. UPPER ABDOMEN: Normal size adrenal glands. SKELETAL/CHEST WALL: DJD, otherwise no gross bony lesions. Impression  Stable nodular densities left upper lobe and lower lobes. Emphysema. Follow-up as clinically indicated. CT WITH CONTRAST:    No results found for this or any previous visit from the past 365 days. CT HIGH RES:    No results found for this or any previous visit from the past 365 days. CT PE PROTOCOL:    CT CHEST PULMONARY EMBOLISM W CONTRAST 06/11/2022    Narrative  CT chest with contrast (pulmonary embolism protocol)    History: COPD with acute exacerbation    Technique: Helically acquired images were obtained from the lung apices to the  domes of the diaphragms reconstructed at 2.5mm intervals after the uneventful  administration of 100 cc's intravenous Isovue-370 in order to evaluate the  pulmonary arteries. Coronal and sagittal reformatted images were submitted.     Radiation dose reduction techniques were used for this study:  Our CT scanners  use one or all of the following: Automated exposure control, adjustment of the  mA and/or kVp according to patient's size, iterative reconstruction. Comparison: 02/26/2022    Findings: There is no pleural or pericardial effusion. The heart and great  vessels are unremarkable. There are no filling defects within the pulmonary  arteries to suggest pulmonary emboli. Moderate emphysematous changes are  present. Patchy interstitial opacity is present within the right upper lobe  anteriorly. Similar but less impressive findings are newly visualized within the  left upper lobe anteriorly. Again noted is the ill-defined masslike opacity within the left lower lobe  measuring approximately 2.0 x 3.6 cm previously measured at approximately 1.7 x  2.8 cm. Also is a ground glass opacity within the left upper lobe measuring 1.5  cm, unchanged when remeasured on the previous study. A left hilar lymph node  measures 1.4 cm in short axis, unchanged. There is a small left Bochdalek hernia containing fat. Imaging of the upper  abdomen is unremarkable. There are compression fractures within the thoracic and  lumbar spine with mild height loss. The fractures at T10 and T11 has occurred in  the interim. Impression  1. No evidence of pulmonary embolism. 2. Interval increase in left lower lobe mass suspicious for bronchogenic  carcinoma. A left hilar lymph node also is enlarged concerning for metastatic  adenopathy. 3. Newly visualized interstitial opacities within the upper lobes, right greater  than left, most likely infectious. 4. Stable left lower lobe groundglass opacity. 5. Interval compression fractures with mild height loss at T10 and T11. LDCT SCREENING:    No results found for this or any previous visit from the past 365 days. PET SCAN:    PET CT SKULL BASE TO MID THIGH 06/24/2022    Narrative  PET/CT: 6/24/2022    INDICATION: Initial diagnosis of lung mass.     TECHNIQUE: After oral administration of gastroview and intravenous  administration of 12.28 mCi of F18 FDG, noncontrast CT images were obtained for  attenuation correction and for fusion with emission PET images. A series of  overlapping emission PET images were then obtained beginning 60 minutes after  injection of FDG. The area imaged spanned the region from the skull base to the  mid thighs. All CT scans performed at this facility use one or all of the  following: Automated exposure control, adjustment of the mA and/or kVp according  to patient's size, iterative reconstruction. COMPARISON: CT chest 6/11/2022    FINDINGS:  NECK/CHEST:  No suspicious activity is seen in the neck. Only minimal nonfocal activity is  seen about left posterior facets in the mid cervical spine likely related to  benign degenerative changes. No clear aggressive osseous lesion is suggested on  limited CT images. The most clear suspicious activity is seen associated with a large heterogeneous  lesion in the left lower lobe demonstrating spiculated appearing components best  appreciated on axial image 92 measuring 4.4 cm x 2.3 cm in greatest transverse  dimensions and with a maximum SUV of 11.2 g/mL. Malignancy is not excluded given  the appearance. Additional focal appearing activity is seen in the left hilum on  pet/CT image 85 with a maximum SUV of 9.1 g/mL and in the subcarinal mediastinum  on axial image 84 with a maximum SUV of 6.3 g/mL concerning for left hilar and  subcarinal mediastinal diego metastases, respectively. Additional mild activity  is seen in the mediastinum and left hilum on pet/CT image 76 which does appear  to correspond to small prevascular and left hilar lymph nodes seen on axial CT  image 39 of the prior the chest dated 6/11/2022 which are suspicious for  additional early diego metastases. No clearly suspicious activity is otherwise  seen in the chest. Mild to moderate activity is seen in the medial right upper  lobe on pet/CT image 56 with a maximum SUV of 4.8 g/mL.  Although the degree of  activity is suspicious, this appears to correspond to a fluid containing additional dermal neoplasm or  focal abnormal inflammatory process otherwise. This should be clinically  evaluated with direct visualization. This has been marked on image to assist  with clinical correlation. 4. Mild activity associated with a cystic abnormality in the medial right upper  lobe. Although the degree of activity is suspicious (maximum SUV of 4.8 g/mL)  the CT features are more consistent with a potential inflammatory process. Attention on follow-up studies is recommended. Exercise oximetry:      Spirometry:   No flowsheet data found. Echo:  No valid procedures specified. ASSESSMENT:  (Medical Decision Making)   Diagnosis Orders   1. Lung mass     2. Mediastinal lymphadenopathy     3. Chronic respiratory failure with hypoxia (HCC)     4. COPD, very severe ([address]     5. Centrilobular emphysema (HCC)          Very severe copd. Evaluated for lung transplant EBUS due to renal CA. Completed pulmonary rehab already. On maximal inhaled therapies. Now requiring 2 to 3 L of oxygen at all times. Enlarging left lower lobe mass with mediastinal and hilar lymphadenopathy concerning for primary lung cancer. She still  PLAN:  -We will set up with bronchoscopy with EBUS elevated navigation guided sampling of the left-sided abnormalities.  -Continue current COPD regimen of Symbicort, Daliresp, Spiriva, albuterol  -f/u 2 months. Inseminated can probably find her up at tumor board follow-up spot about a month or so she says she is up about 100.5 has not been that high in the last couple of days or so can see if this is of infectious but he does have a possible positive that does not look thank you thank another yes-man    F/u in 1-2 motnhs. Portions of this note were created using voice recognition software. As such, error of speech recognition may have occurred. No orders of the defined types were placed in this encounter.       No orders of the defined types were placed in this encounter.         Florence Washington MD  Electronically signed

## 2022-07-05 ENCOUNTER — PATIENT MESSAGE (OUTPATIENT)
Dept: PULMONOLOGY | Age: 72
End: 2022-07-05

## 2022-07-05 DIAGNOSIS — R06.09 DYSPNEA ON EXERTION: Primary | ICD-10-CM

## 2022-07-05 DIAGNOSIS — Z51.5 PALLIATIVE CARE PATIENT: ICD-10-CM

## 2022-07-05 NOTE — TELEPHONE ENCOUNTER
Patient said she needs to talk someone about pain meds . Says she is out of Tramadol .  She is upset , she said she is having procedure with Dr. Branden Arce next week

## 2022-07-06 RX ORDER — TRAMADOL HYDROCHLORIDE 50 MG/1
50 TABLET ORAL DAILY
Qty: 30 TABLET | Refills: 2 | Status: SHIPPED | OUTPATIENT
Start: 2022-07-06 | End: 2022-07-18

## 2022-07-06 NOTE — TELEPHONE ENCOUNTER
I have spoke to Anny Hennessy NP regarding her Ultram--I have reviewed the patient's controlled substance prescription history, as maintained in the [facility], so that the prescription for a controlled substance can be given. Refill on Ultram 50 mg, #90, no refills.

## 2022-07-06 NOTE — TELEPHONE ENCOUNTER
Spoke with patient. Tramadol has been prescribed in the past by Palliative Care for dyspnea and has worked well according to office notes. Discussed with Paul Cortes NP, who also discussed case with Aaron Smith.  Patient notified Tramadol will be sent to pharmacy and voiced understanding.  /dd

## 2022-07-06 NOTE — TELEPHONE ENCOUNTER
Attempted to call patient, she is connected with other staff member, notes will be updated with action.

## 2022-07-12 ENCOUNTER — TELEPHONE (OUTPATIENT)
Dept: PULMONOLOGY | Age: 72
End: 2022-07-12

## 2022-07-18 ENCOUNTER — TELEPHONE (OUTPATIENT)
Dept: PULMONOLOGY | Age: 72
End: 2022-07-18

## 2022-07-18 RX ORDER — TRAMADOL HYDROCHLORIDE 50 MG/1
50 TABLET ORAL EVERY EVENING
COMMUNITY
End: 2022-08-08

## 2022-07-18 RX ORDER — PREDNISONE 10 MG/1
10 TABLET ORAL
COMMUNITY

## 2022-07-18 NOTE — TELEPHONE ENCOUNTER
Incoming call from [address] [place] [address], Dr Timothy Capellan will discuss with Dr Flower Pride and update chart.

## 2022-07-18 NOTE — TELEPHONE ENCOUNTER
Discussed plavix and hold for procedure with cardiology. Given the urgent nature of the procedure with concern for lung cancer, ok with hold but minimize the time off medication. Will restart day after procedure.      Francis Lerner MD

## 2022-07-18 NOTE — PROGRESS NOTES
Reviewed pt medical hx with Dr Garrick Reese, instructed to follow up on clearance from cardiologist prior to holding pt plavix due to last heart stent placed in 2/2022. Spoke with Benjamín Mckeon RT at [facility]- Dr Gabino Hickey to contact [facility]. And place note in EHR. Chart flagged to have charge nurse follow up clearance.

## 2022-07-18 NOTE — PROGRESS NOTES
Patient verified name, , and procedure. Type: 1a; abbreviated assessment per anesthesia guidelines  Labs per surgeon: none  Labs per anesthesia: none      Instructed pt that they will be notified by the doctor office for time of arrival to Bronchoscopy lab. Follow diet and prep instructions per office. May have clear liquids until 4 hours prior to time of arrival.    [place] Roxo or shower the night before and the am of surgery with antibacterial soap. No lotions, oils, powders, cologne on skin. No make up, eye make up or jewelry. Wear loose fitting comfortable, clean clothing. Must have adult present in building the entire time . Medications for the day of procedure Aspirin 81mg, Zyrtec, Lexapro, Tapazole,Toprol, and Protonix. Pt instructed to use  inhalers as instructed the morning of surgery. Pt continue to hold Plavix as instructed by her surgeons office. The following discharge instructions reviewed with patient: medication given during procedure may cause drowsiness for several hours, therefore, do not drive or operate machinery for remainder of the day, no alcohol on the day of your procedure, resume regular diet and activity unless otherwise directed, for mild sore throat you may use Cepacol throat lozenges or warm salt water gargles as needed, call your physician for any problems or questions. Patient verbalizes understanding.

## 2022-07-19 ENCOUNTER — ANESTHESIA EVENT (OUTPATIENT)
Dept: ENDOSCOPY | Age: 72
End: 2022-07-19
Payer: MEDICARE

## 2022-07-19 RX ORDER — LIDOCAINE HYDROCHLORIDE 10 MG/ML
1 INJECTION, SOLUTION EPIDURAL; INFILTRATION; INTRACAUDAL; PERINEURAL
Status: CANCELLED | OUTPATIENT
Start: 2022-07-19 | End: 2022-07-19

## 2022-07-19 RX ORDER — SODIUM CHLORIDE 9 MG/ML
INJECTION, SOLUTION INTRAVENOUS PRN
Status: CANCELLED | OUTPATIENT
Start: 2022-07-19

## 2022-07-19 NOTE — PROGRESS NOTES
Richard Chris MD     TG    4:05 PM  Note   Discussed plavix and hold for procedure with cardiology. Given the urgent nature of the procedure with concern for lung cancer, ok with hold but minimize the time off medication. Will restart day after procedure. Richard Chris MD                  TG    4:06 PM  Richard Chris MD routed this conversation to Bienvenido Chaudhari, [facility] JOSE Benitez    4:57 PM  Note   Noted. Encounter closed.

## 2022-07-20 ENCOUNTER — HOSPITAL ENCOUNTER (OUTPATIENT)
Age: 72
Setting detail: OUTPATIENT SURGERY
Discharge: HOME | End: 2022-07-20
Attending: INTERNAL MEDICINE | Admitting: INTERNAL MEDICINE
Payer: MEDICARE

## 2022-07-20 ENCOUNTER — ANESTHESIA (OUTPATIENT)
Dept: ENDOSCOPY | Age: 72
End: 2022-07-20
Payer: MEDICARE

## 2022-07-20 ENCOUNTER — APPOINTMENT (OUTPATIENT)
Dept: GENERAL RADIOLOGY | Age: 72
End: 2022-07-20
Attending: INTERNAL MEDICINE
Payer: MEDICARE

## 2022-07-20 VITALS
TEMPERATURE: 98 F | HEIGHT: 68 IN | RESPIRATION RATE: 15 BRPM | BODY MASS INDEX: 22.73 KG/M2 | DIASTOLIC BLOOD PRESSURE: 60 MMHG | SYSTOLIC BLOOD PRESSURE: 131 MMHG | WEIGHT: 150 LBS | HEART RATE: 64 BPM | OXYGEN SATURATION: 94 %

## 2022-07-20 DIAGNOSIS — R59.9 ADENOPATHY: ICD-10-CM

## 2022-07-20 DIAGNOSIS — R91.8 LUNG MASS: ICD-10-CM

## 2022-07-20 LAB — POTASSIUM BLD-SCNC: 3.6 MMOL/L (ref 3.5–5.1)

## 2022-07-20 PROCEDURE — 3700000000 HC ANESTHESIA ATTENDED CARE: Performed by: INTERNAL MEDICINE

## 2022-07-20 PROCEDURE — 88172 CYTP DX EVAL FNA 1ST EA SITE: CPT

## 2022-07-20 PROCEDURE — 6360000002 HC RX W HCPCS

## 2022-07-20 PROCEDURE — 6370000000 HC RX 637 (ALT 250 FOR IP): Performed by: INTERNAL MEDICINE

## 2022-07-20 PROCEDURE — 2580000003 HC RX 258

## 2022-07-20 PROCEDURE — 7100000001 HC PACU RECOVERY - ADDTL 15 MIN: Performed by: INTERNAL MEDICINE

## 2022-07-20 PROCEDURE — 6360000002 HC RX W HCPCS: Performed by: NURSE ANESTHETIST, CERTIFIED REGISTERED

## 2022-07-20 PROCEDURE — 3700000001 HC ADD 15 MINUTES (ANESTHESIA): Performed by: INTERNAL MEDICINE

## 2022-07-20 PROCEDURE — 84132 ASSAY OF SERUM POTASSIUM: CPT

## 2022-07-20 PROCEDURE — 88342 IMHCHEM/IMCYTCHM 1ST ANTB: CPT

## 2022-07-20 PROCEDURE — 2580000003 HC RX 258: Performed by: ANESTHESIOLOGY

## 2022-07-20 PROCEDURE — 2709999900 HC NON-CHARGEABLE SUPPLY: Performed by: INTERNAL MEDICINE

## 2022-07-20 PROCEDURE — 7100000010 HC PHASE II RECOVERY - FIRST 15 MIN: Performed by: INTERNAL MEDICINE

## 2022-07-20 PROCEDURE — 88173 CYTOPATH EVAL FNA REPORT: CPT

## 2022-07-20 PROCEDURE — 88305 TISSUE EXAM BY PATHOLOGIST: CPT

## 2022-07-20 PROCEDURE — 7100000000 HC PACU RECOVERY - FIRST 15 MIN: Performed by: INTERNAL MEDICINE

## 2022-07-20 PROCEDURE — 2500000003 HC RX 250 WO HCPCS: Performed by: NURSE ANESTHETIST, CERTIFIED REGISTERED

## 2022-07-20 PROCEDURE — 2720000010 HC SURG SUPPLY STERILE: Performed by: INTERNAL MEDICINE

## 2022-07-20 PROCEDURE — 31652 BRONCH EBUS SAMPLNG 1/2 NODE: CPT | Performed by: INTERNAL MEDICINE

## 2022-07-20 PROCEDURE — 7100000011 HC PHASE II RECOVERY - ADDTL 15 MIN: Performed by: INTERNAL MEDICINE

## 2022-07-20 PROCEDURE — 88177 CYTP FNA EVAL EA ADDL: CPT

## 2022-07-20 PROCEDURE — 3603165200 HC BRNCHSC EBUS GUIDED SAMPL 1/2 NODE STATION/STRUX: Performed by: INTERNAL MEDICINE

## 2022-07-20 PROCEDURE — 88341 IMHCHEM/IMCYTCHM EA ADD ANTB: CPT

## 2022-07-20 RX ORDER — HALOPERIDOL 5 MG/ML
1 INJECTION INTRAMUSCULAR
Status: DISCONTINUED | OUTPATIENT
Start: 2022-07-20 | End: 2022-07-20 | Stop reason: HOSPADM

## 2022-07-20 RX ORDER — PROPOFOL 10 MG/ML
INJECTION, EMULSION INTRAVENOUS PRN
Status: DISCONTINUED | OUTPATIENT
Start: 2022-07-20 | End: 2022-07-20 | Stop reason: SDUPTHER

## 2022-07-20 RX ORDER — SODIUM CHLORIDE, SODIUM LACTATE, POTASSIUM CHLORIDE, CALCIUM CHLORIDE 600; 310; 30; 20 MG/100ML; MG/100ML; MG/100ML; MG/100ML
INJECTION, SOLUTION INTRAVENOUS CONTINUOUS
Status: DISCONTINUED | OUTPATIENT
Start: 2022-07-20 | End: 2022-07-20 | Stop reason: HOSPADM

## 2022-07-20 RX ORDER — LIDOCAINE HYDROCHLORIDE 20 MG/ML
INJECTION, SOLUTION EPIDURAL; INFILTRATION; INTRACAUDAL; PERINEURAL PRN
Status: DISCONTINUED | OUTPATIENT
Start: 2022-07-20 | End: 2022-07-20 | Stop reason: SDUPTHER

## 2022-07-20 RX ORDER — DEXAMETHASONE SODIUM PHOSPHATE 4 MG/ML
INJECTION, SOLUTION INTRA-ARTICULAR; INTRALESIONAL; INTRAMUSCULAR; INTRAVENOUS; SOFT TISSUE PRN
Status: DISCONTINUED | OUTPATIENT
Start: 2022-07-20 | End: 2022-07-20 | Stop reason: SDUPTHER

## 2022-07-20 RX ORDER — SODIUM CHLORIDE 0.9 % (FLUSH) 0.9 %
5-40 SYRINGE (ML) INJECTION PRN
Status: DISCONTINUED | OUTPATIENT
Start: 2022-07-20 | End: 2022-07-20 | Stop reason: HOSPADM

## 2022-07-20 RX ORDER — SODIUM CHLORIDE 0.9 % (FLUSH) 0.9 %
5-40 SYRINGE (ML) INJECTION EVERY 12 HOURS SCHEDULED
Status: DISCONTINUED | OUTPATIENT
Start: 2022-07-20 | End: 2022-07-20 | Stop reason: HOSPADM

## 2022-07-20 RX ORDER — ROCURONIUM BROMIDE 10 MG/ML
INJECTION, SOLUTION INTRAVENOUS PRN
Status: DISCONTINUED | OUTPATIENT
Start: 2022-07-20 | End: 2022-07-20 | Stop reason: SDUPTHER

## 2022-07-20 RX ORDER — ONDANSETRON 2 MG/ML
4 INJECTION INTRAMUSCULAR; INTRAVENOUS
Status: DISCONTINUED | OUTPATIENT
Start: 2022-07-20 | End: 2022-07-20 | Stop reason: HOSPADM

## 2022-07-20 RX ORDER — ONDANSETRON 2 MG/ML
INJECTION INTRAMUSCULAR; INTRAVENOUS PRN
Status: DISCONTINUED | OUTPATIENT
Start: 2022-07-20 | End: 2022-07-20 | Stop reason: SDUPTHER

## 2022-07-20 RX ORDER — ASPIRIN 81 MG/1
81 TABLET, CHEWABLE ORAL ONCE
Status: CANCELLED | OUTPATIENT
Start: 2022-07-20

## 2022-07-20 RX ORDER — LIDOCAINE HYDROCHLORIDE 40 MG/ML
SOLUTION TOPICAL ONCE
Status: COMPLETED | OUTPATIENT
Start: 2022-07-20 | End: 2022-07-20

## 2022-07-20 RX ADMIN — SUGAMMADEX 200 MG: 100 INJECTION, SOLUTION INTRAVENOUS at 09:57

## 2022-07-20 RX ADMIN — LIDOCAINE HYDROCHLORIDE: 40 SOLUTION TOPICAL at 09:48

## 2022-07-20 RX ADMIN — ONDANSETRON 4 MG: 2 INJECTION INTRAMUSCULAR; INTRAVENOUS at 09:52

## 2022-07-20 RX ADMIN — PROPOFOL 150 MG: 10 INJECTION, EMULSION INTRAVENOUS at 09:32

## 2022-07-20 RX ADMIN — SODIUM CHLORIDE, SODIUM LACTATE, POTASSIUM CHLORIDE, AND CALCIUM CHLORIDE: 600; 310; 30; 20 INJECTION, SOLUTION INTRAVENOUS at 09:24

## 2022-07-20 RX ADMIN — LIDOCAINE HYDROCHLORIDE 100 MG: 20 INJECTION, SOLUTION EPIDURAL; INFILTRATION; INTRACAUDAL; PERINEURAL at 09:32

## 2022-07-20 RX ADMIN — DEXAMETHASONE SODIUM PHOSPHATE 4 MG: 4 INJECTION, SOLUTION INTRAMUSCULAR; INTRAVENOUS at 09:52

## 2022-07-20 RX ADMIN — ROCURONIUM BROMIDE 30 MG: 50 INJECTION, SOLUTION INTRAVENOUS at 09:32

## 2022-07-20 ASSESSMENT — PAIN - FUNCTIONAL ASSESSMENT
PAIN_FUNCTIONAL_ASSESSMENT: NONE - DENIES PAIN
PAIN_FUNCTIONAL_ASSESSMENT: 0-10
PAIN_FUNCTIONAL_ASSESSMENT: 0-10

## 2022-07-20 ASSESSMENT — COPD QUESTIONNAIRES: CAT_SEVERITY: SEVERE

## 2022-07-20 NOTE — ANESTHESIA PRE PROCEDURE
Department of Anesthesiology  Preprocedure Note       Name:  Farideh Pyle   Age:  66 y.o.  :  1950                                          MRN:  [id number]         Date:  2022      Surgeon: Kaye Signs):  Corinna Becerril MD    Procedure: Procedure(s):  BRONCHOSCOPY with navigational BX and EBUS    Medications prior to admission:   Prior to Admission medications    Medication Sig Start Date End Date Taking? Authorizing Provider   traMADol (ULTRAM) 50 MG tablet Take 50 mg by mouth every evening. Yes Historical Provider, MD   predniSONE (DELTASONE) 10 MG tablet Take 10 mg by mouth daily (with breakfast)   Yes Historical Provider, MD   Pseudoephedrine-guaiFENesin ([place] D PO) Take by mouth in the morning and at bedtime   Yes Historical Provider, MD   tiotropium (SPIRIVA RESPIMAT) 2.5 MCG/ACT AERS inhaler Inhale 2 puffs into the lungs daily 22   Shay Atkinson MD   LORazepam (ATIVAN) 0.5 MG tablet Take 1 tablet by mouth 3 times daily as needed for Anxiety for up to 30 days.  22  Shay Atkinson MD   torsemide (DEMADEX) 10 MG tablet Take 10 mg by mouth daily    Historical Provider, MD   acetaminophen (TYLENOL) 325 MG tablet Take by mouth every 4 hours as needed    Ar Automatic Reconciliation   aspirin 81 MG chewable tablet Take 81 mg by mouth daily 3/31/22   Ar Automatic Reconciliation   atorvastatin (LIPITOR) 80 MG tablet Take 80 mg by mouth at bedtime TAKE 1 TABLET BY MOUTH DAILY 21   Ar Automatic Reconciliation   budesonide-formoterol (SYMBICORT) 160-4.5 MCG/ACT AERO Inhale 2 puffs into the lungs 2 times daily 10/7/21   Ar Automatic Reconciliation   butalbital-acetaminophen-caffeine (FIORICET, ESGIC) -40 MG per tablet Take 1 tablet by mouth every 6 hours as needed 3/30/22   Ar Automatic Reconciliation   cetirizine (ZYRTEC) 10 MG tablet Take 10 mg by mouth daily as needed 3/30/22   Ar Automatic Reconciliation   clopidogrel (PLAVIX) 75 MG tablet Take 75 mg by mouth daily 3/31/22   Ar Automatic Reconciliation   escitalopram (LEXAPRO) 10 MG tablet Take 10 mg by mouth daily 4/14/22   Ar Automatic Reconciliation   ezetimibe (ZETIA) 10 MG tablet Take 10 mg by mouth at bedtime 10/7/21   Ar Automatic Reconciliation   methIMAzole (TAPAZOLE) 10 MG tablet Take 10 mg by mouth daily 3/30/22   Ar Automatic Reconciliation   metoprolol succinate (TOPROL XL) 100 MG extended release tablet Take 100 mg by mouth daily 3/30/22   Ar Automatic Reconciliation   nitroGLYCERIN (NITROSTAT) 0.4 MG SL tablet Place 0.4 mg under the tongue 3 times daily as needed 2/4/22   Ar Automatic Reconciliation   pantoprazole (PROTONIX) 40 MG tablet Take 40 mg by mouth 2 times daily 3/30/22   Ar Automatic Reconciliation   Roflumilast (DALIRESP) 500 MCG tablet Take 500 mcg by mouth nightly 3/30/22   Ar Automatic Reconciliation       Current medications:    No current facility-administered medications for this encounter. Allergies:     Allergies   Allergen Reactions    Promethazine Nausea And Vomiting and Other (See Comments)     Severe vomiting        Problem List:    Patient Active Problem List   Diagnosis Code    Essential hypertension, benign I10    Ventricular ectopy I49.3    Iron deficiency anemia due to chronic blood loss D50.0    Carotid stenosis, right I65.21    Carotid stenosis I65.29    Right ear pain H92.01    Pulmonary mass R91.8    Chronic anxiety F41.9    Coronary artery disease involving native coronary artery of native heart without angina pectoris I25.10    Chronic respiratory failure with hypoxia (HCC) J96.11    Personal history of tobacco use Z87.891    COPD, very severe ([facility] [address] J44.9    History of MI (myocardial infarction) I25.2    H/O carotid endarterectomy Z98.890    DNR (do not resuscitate) Z66    Acute bilateral low back pain without sciatica M54.50    Centrilobular emphysema (HCC) J43.2    CAD S/P percutaneous coronary angioplasty I25.10, Z98.61    Palliative care patient Z51.5    Ischemic heart disease I25.9    Hypertensive urgency I16.0    Hyperlipidemia E78.5    Hypoxemia R09.02    Moderate to severe mitral regurgitation I34.0    Hypokalemic alkalosis E87.3    Depression F32. A    DDD (degenerative disc disease), lumbar M51.36    Anxiety F41.9    Acute on chronic respiratory failure (HCC) J96.20    Adenopathy R59.9       Past Medical History:        Diagnosis Date    Acute respiratory failure with hypoxia ([address] 2013    Asthma     CAD (coronary artery disease)     Follow[facility].     Chest pain 2019    pt denies any reent CP or increase SOB    Closed right hip fracture ([address] 2022    COPD with acute lower respiratory infection ([address] 02/10/2022    severe COPD with centrilobular emphysema, on nocturnal O2.    EKG, abnormal 02/15/2022    Femur fracture, right ([address] 2022    Former cigarette smoker     H/O heart artery stent     several stents- last stents placed 2022    History of MI (myocardial infarction)     Lung mass     Multiple closed anterior-posterior compression fractures of pelvis ([address] 2022    Other fracture of right femur, initial encounter for closed fracture ([address] 2022    Oxygen dependent     2L NC continuous    Post-traumatic subdural hematoma ([address] 03/15/2022    SDH (subdural hematoma) ([facility] 2022    hx of    Thyroid disease        Past Surgical History:        Procedure Laterality Date    CAROTID ENDARTERECTOMY Right     CATARACT REMOVAL Bilateral     HIP SURGERY Right     WISDOM TOOTH EXTRACTION      as a teenagers       Social History:    Social History     Tobacco Use    Smoking status: Former     Packs/day: 0.50     Years: 50.00     Pack years: 25.00     Types: Cigarettes     Quit date: 2017     Years since quittin.5    Smokeless tobacco: Never   Substance Use Topics    Alcohol use: Not Currently                                Counseling given: Not Answered      Vital Signs (Current):   Vitals:    07/18/22 1429   Weight: 150 lb (68 kg)   Height: 5' 7.5\" (1.715 m)                                              BP Readings from Last 3 Encounters:   06/28/22 120/80   06/12/22 (!) 153/70   03/29/22 110/60       NPO Status:                                                                                 BMI:   Wt Readings from Last 3 Encounters:   07/18/22 150 lb (68 kg)   06/28/22 138 lb (62.6 kg)   06/09/22 140 lb (63.5 kg)     Body mass index is 23.15 kg/m². CBC:   Lab Results   Component Value Date/Time    WBC 7.7 06/12/2022 03:59 AM    RBC 3.65 06/12/2022 03:59 AM    HGB 10.0 06/12/2022 03:59 AM    HCT 32.5 06/12/2022 03:59 AM    MCV 89.0 06/12/2022 03:59 AM    RDW 14.3 06/12/2022 03:59 AM     06/12/2022 03:59 AM       CMP:   Lab Results   Component Value Date/Time     06/12/2022 03:59 AM    K 3.8 06/12/2022 03:59 AM     06/12/2022 03:59 AM    CO2 26 06/12/2022 03:59 AM    BUN 11 06/12/2022 03:59 AM    CREATININE 0.70 06/12/2022 03:59 AM    GFRAA >60 06/12/2022 03:59 AM    AGRATIO 0.8 03/21/2022 02:05 AM    LABGLOM >60 06/12/2022 03:59 AM    GLUCOSE 95 06/12/2022 03:59 AM    PROT 6.6 06/12/2022 03:59 AM    CALCIUM 9.0 06/12/2022 03:59 AM    BILITOT 0.4 06/12/2022 03:59 AM    ALKPHOS 67 06/12/2022 03:59 AM    ALKPHOS 81 03/21/2022 02:05 AM    AST 13 06/12/2022 03:59 AM    ALT 16 06/12/2022 03:59 AM       POC Tests: No results for input(s): POCGLU, POCNA, POCK, POCCL, POCBUN, POCHEMO, POCHCT in the last 72 hours.     Coags:   Lab Results   Component Value Date/Time    PROTIME 13.0 03/15/2022 03:02 PM    INR 1.0 03/15/2022 03:02 PM    APTT 27.8 03/15/2022 03:02 PM       HCG (If Applicable): No results found for: PREGTESTUR, PREGSERUM, HCG, HCGQUANT     ABGs:   Lab Results   Component Value Date/Time    PHART 7.32 02/06/2022 06:54 AM    PO2ART 95 02/06/2022 06:54 AM    LAT2JCC 46.3 02/06/2022 06:54 AM    WRS9LHX 23.8 02/06/2022 06:54 AM        Type & Screen (If Applicable):  No results found for: LABABO, LABRH    Drug/Infectious Status (If Applicable):  No results found for: HIV, HEPCAB    COVID-19 Screening (If Applicable):   Lab Results   Component Value Date/Time    COVID19 NOT DETECTED 06/09/2022 08:14 AM           Anesthesia Evaluation  Patient summary reviewed  Airway: Mallampati: II          Dental: normal exam         Pulmonary:Negative Pulmonary ROS breath sounds clear to auscultation  (+) COPD (Home O2): severe,                             Cardiovascular:  Exercise tolerance: good (>4 METS),   (+) hypertension:, valvular problems/murmurs (moderate to severe 2/2022): MR, CAD:, CABG/stent (last stent 2/2022):,     (-) past MI, murmur and peripheral edema      Rhythm: regular  Rate: normal                 ROS comment: Patient denies Chest pain or tightness, SOB at rest, and orthopnea. Neuro/Psych:   Negative Neuro/Psych ROS  (+) psychiatric history:   (-) CVA           GI/Hepatic/Renal: Neg GI/Hepatic/Renal ROS            Endo/Other: Negative Endo/Other ROS                    Abdominal:             Vascular: negative vascular ROS. Other Findings:           Anesthesia Plan      general     ASA 4     (GETA)  Induction: intravenous. Anesthetic plan and risks discussed with patient.                         Cricket Ozuna MD   7/20/2022

## 2022-07-20 NOTE — ANESTHESIA POSTPROCEDURE EVALUATION
Department of Anesthesiology  Postprocedure Note    Patient: Tasneem Coronado  MRN: [id number]  YOB: 1950  Date of evaluation: 7/20/2022      Procedure Summary     Date: 07/20/22 Room / Location: [facility] ENDO FLOURO 1 / [facility] ENDOSCOPY    Anesthesia Start: 9068 Anesthesia Stop: 3947    Procedure: BRONCHOSCOPY ENDOBRONCHIAL ULTRASOUND (Chest) Diagnosis:       Lung mass      Adenopathy      (Lung mass [R91.8])      (Adenopathy [R59.9])    Surgeons: Gage Gil MD Responsible Provider: Chad Mcclain MD    Anesthesia Type: general ASA Status: 4          Anesthesia Type: No value filed.     Chela Phase I: Chela Score: 8    Chela Phase II: Chela Score: 10      Anesthesia Post Evaluation    Patient location during evaluation: PACU  Patient participation: complete - patient participated  Level of consciousness: awake and alert  Airway patency: patent  Nausea & Vomiting: no nausea and no vomiting  Complications: no  Cardiovascular status: hemodynamically stable  Respiratory status: acceptable, nonlabored ventilation and spontaneous ventilation  Hydration status: euvolemic  Comments: /60   Pulse 64   Temp 98 °F (36.7 °C) (Temporal)   Resp 15   Ht 5' 7.5\" (1.715 m)   Wt 150 lb (68 kg)   SpO2 94%   BMI 23.15 kg/m²     Multimodal analgesia pain management approach

## 2022-07-20 NOTE — ANESTHESIA PROCEDURE NOTES
Airway  Date/Time: 7/20/2022 9:35 AM  Urgency: elective    Airway not difficult    General Information and Staff    Patient location during procedure: OR  Anesthesiologist: Shara Franklin MD  Resident/CRNA: Wilma Osgood, APRN - CRNA  Performed: resident/CRNA     Indications and Patient Condition  Indications for airway management: anesthesia  Spontaneous Ventilation: absent  Sedation level: deep  Preoxygenated: yes  Patient position: sniffing  MILS maintained throughout  Mask difficulty assessment: vent by bag mask + OA or adjuvant +/- NMBA    Final Airway Details  Final airway type: endotracheal airway      Successful airway: ETT  Cuffed: yes   Successful intubation technique: direct laryngoscopy  Facilitating devices/methods: intubating stylet and cricoid pressure  Endotracheal tube insertion site: oral  Blade: Harjinder  Blade size: #3  ETT size (mm): 8.5  Cormack-Lehane Classification: grade III - view of epiglottis only  Placement verified by: chest auscultation, bronchoscopy and capnometry   Measured from: teeth  ETT to teeth (cm): 22  Number of attempts at approach: 1  Ventilation between attempts: bag mask  Number of other approaches attempted: 0      Non-anticipated difficult airway: yes ([place])

## 2022-07-20 NOTE — DISCHARGE INSTRUCTIONS
RESPIRATORY CARE - BRONCHOSCOPY - DISCHARGE INSTRUCTIONS      You received a lot of numbing medication for your throat and nose, and you also received medication to make you sleepy during your procedure. Because of this and because the bronchoscopy may have irritated your airways, we ask that you follow these directions:    1. Do not eat or drink for 2 hours after your procedure . After that, you may have what you please. You may want to try some liquids first, because your throat may be a little sore. 2. Medication may cause drowsiness for several hours, therefore, do not drive or operate machinery for remainder of the day. No alcohol today. 3. You may cough up more mucus than usual and you may see some blood, but this is expected and should subside by the following day. 4. If severe throat irritation, coughing, or bleeding continue, call your doctor. 5.         If you run a fever greater than 102, call [facility] at [phone]. 6.                          has asked that you:    MEDICATION INTERACTION:  During your procedure you potentially received a medication or medications which may reduce the effectiveness of oral contraceptives. Please consider other forms of contraception for 1 month following your procedure if you are currently using oral contraceptives as your primary form of birth control.  In addition to this, we recommend continuing your oral contraceptive as prescribed, unless otherwise instructed by your physician, during this time    After general anesthesia or intravenous sedation, for 24 hours or while taking prescription Narcotics:  Limit your activities  A responsible adult needs to be with you for the next 24 hours  Do not drive and operate hazardous machinery  Do not make important personal or business decisions  Do not drink alcoholic beverages  If you have not urinated within 8 hours after discharge, and you are experiencing discomfort from urinary retention, please go to the nearest ED. If you have sleep apnea and have a CPAP machine, please use it for all naps and sleeping. Please use caution when taking narcotics and any of your home medications that may cause drowsiness. *  Please give a list of your current medications to your Primary Care Provider. *  Please update this list whenever your medications are discontinued, doses are      changed, or new medications (including over-the-counter products) are added. *  Please carry medication information at all times in case of emergency situations. These are general instructions for a healthy lifestyle:  No smoking/ No tobacco products/ Avoid exposure to second hand smoke  Surgeon General's Warning:  Quitting smoking now greatly reduces serious risk to your health. Obesity, smoking, and sedentary lifestyle greatly increases your risk for illness  A healthy diet, regular physical exercise & weight monitoring are important for maintaining a healthy lifestyle    You may be retaining fluid if you have a history of heart failure or if you experience any of the following symptoms:  Weight gain of 3 pounds or more overnight or 5 pounds in a week, increased swelling in our hands or feet or shortness of breath while lying flat in bed. Please call your doctor as soon as you notice any of these symptoms; do not wait until your next office visit.

## 2022-07-20 NOTE — OP NOTE
Operative Note      Patient: Agnes Douglass  YOB: 1950  MRN: [id number]    Date of Procedure: 7/20/2022    Pre-Op Diagnosis: Lung mass [R91.8]  Adenopathy [R59.9]    Post-Op Diagnosis:  Cancer       Procedure(s):  BRONCHOSCOPY ENDOBRONCHIAL ULTRASOUND    Surgeon(s):  Dinora Johnson MD    Assistant:   * No surgical staff found *    Anesthesia: General    Estimated Blood Loss (mL): Minimal    Complications: None    Specimens:   * No specimens in log *    Implants:  * No implants in log *      Drains: * No LDAs found *    Findings:     Station 7 LN was not visible due to its location and intervening lung, 11L location LN was sampled and positive for malignancy(NSCCA)    Detailed Description of Procedure:    PROCEDURE  Bronchoscopy with Endobronchial Ultrasound Guided Fine Needle Aspiration Of Medistinal Lymph nodes and airway inspection. INDICATION   Diagnosis of Mediastinal Lymphadenopathy/Staging of Lung Cancer  11L LN circled,  low station 7 LN un marked and not visible on EBUS        LLL tumor. POST OP DIAGNOSIS:  Station 7 was not visible on EBUS, station  11L  was biopsied and positive for malignancy on NELLY (NSCLCA). Based on CT chest/PET CT / and EBUS pt is a STAGE [T2b(4.4cm), N2,M0] IIIA NSC lung Cancer. Await final path and IHC, 6 dedicated samples were obtained and sent in toto for cell block. Although subcarinal  station 7LN could not be visualized and was not sampled, it is + on PET just as the tumor and 11L LN and safe to assume that it is also malignant, thus N2 stage as outlinerd above. ANESTHESIA  GETA    AIRWAY INSPECTION  After obtaining informed consent, using a bite block, an Olympus EBUS  bronchoscope was  introduced into the trachea through the vocal cords without complications.          RIGHT  LOCATION NORM/ABNORMAL DESCRIPTION   VOCAL CORDS NL    TRACHEA NL    JADE NL    RMSB NL    RUL NL    BI NL    RML NL    SUP SEGM RLL NL    MED BASAL NL features:?  Involves main bronchus regardless of distance from the leoncio but without involvement of the leoncio  Invades visceral pleura  Associated with atelectasis or obstructive pneumonitis that extends to the hilar region, involving part or all of the lung   T2a Tumor >3 cm but ?4 cm in greatest dimension   T2b Tumor >4 cm but ?5 cm in greatest dimension   T3 Tumor >5 cm but ?7 cm in greatest dimension or associated with separate tumor nodule(s) in the same lobe as the primary tumor or directly invades any of the following structures: chest wall (including the parietal pleura and superior sulcus tumors), phrenic nerve, parietal pericardium   T4 Tumor >7 cm in greatest dimension or associated with separate tumor nodule(s) in a different ipsilateral lobe than that of the primary tumor or invades any of the following structures: diaphragm, mediastinum, heart, great vessels, trachea, recurrent laryngeal nerve, esophagus, vertebral body, and leoncio   N: Regional lymph node involvement   Nx Regional lymph nodes cannot be assessed   N0 No regional lymph node metastasis   N1 Metastasis in ipsilateral peribronchial and/or ipsilateral hilar lymph nodes and intrapulmonary nodes, including involvement by direct extension   N2 Metastasis in ipsilateral mediastinal and/or subcarinal lymph node(s)   N3 Metastasis in contralateral mediastinal, contralateral hilar, ipsilateral or contralateral scalene, or supraclavicular lymph node(s)   M: Distant metastasis   M0 No distant metastasis   M1 Distant metastasis present   M1a Separate tumor nodule(s) in a contralateral lobe; tumor with pleural or pericardial nodule(s) or malignant pleural or pericardial effusion?    M1b Single extrathoracic metastasis§   M1c Multiple extrathoracic metastases in one or more organs   Stage groupings   Occult carcinoma TX N0 M0   Stage 0 Tis N0 M0   Stage IA1 T1a(mi) N0 M0    T1a N0 M0   Stage IA2 T1b N0 M0   Stage IA3 T1c N0 M0   Stage IB T2a N0 M0 Stage IIA T2b N0 M0   Stage IIB T1a to c N1 M0    T2a N1 M0    T2b N1 M0    T3 N0 M0   Stage IIIA T1a to c N2 M0    T2a to b N2 M0    T3 N1 M0    T4 N0 M0    T4 N1 M0   Stage IIIB T1a to c N3 M0    T2a to b N3 M0    T3 N2 M0    T4 N2 M0   Stage IIIC T3 N3 M0    T4 N3 M0   Stage [place] Any T Any N M1a    Any T Any N M1b   Stage IVB Any T Any N M1c   NOTE: Changes to the seventh edition are in bold. TNM: tumor, node, metastasis; Tis: carcinoma in situ; T1a(mi): minimally invasive adenocarcinoma. * The uncommon superficial spreading tumor of any size with its invasive component limited to the bronchial wall, which may extend proximal to the main bronchus, is also classified as T1a. ¶ Solitary adenocarcinoma, ?3 cm with a predominately lepidic pattern and ?5 mm invasion in any one focus. ? T2 tumors with these features are classified as T2a if ?4 cm in greatest dimension or if size cannot be determined, and T2b if >4 cm but ?5 cm in greatest dimension. ? Most pleural (pericardial) effusions with lung cancer are due to tumor. In a few patients, however, multiple microscopic examinations of pleural (pericardial) fluid are negative for tumor and the fluid is nonbloody and not an exudate. When these elements and clinical judgment dictate that the effusion is not related to the tumor, the effusion should be excluded as a staging descriptor. § This includes involvement of a single distant (nonregional) lymph node. Reproduced from: Atif Spence, et al. The IASLC Lung Cancer Staging Project: Proposals for Revision of the TNM Stage Groupings in the Forthcoming (Eighth) Edition of the TNM Classification for Lung Cancer. J Thorac Oncol 2016; 11:39. Table used with the permission of [employer]. All rights reserved. Graphic 411080 Version 1.0                 The procedure was completed without complication and the patient tolerated the procedure well.     EBL: Andreia Gates MD. Electronically signed by Ros Chaudhari MD on 7/20/2022 at 10:09 AM

## 2022-07-21 ENCOUNTER — TELEPHONE (OUTPATIENT)
Dept: PULMONOLOGY | Age: 72
End: 2022-07-21

## 2022-07-21 DIAGNOSIS — C34.90 NON-SMALL CELL LUNG CANCER, UNSPECIFIED LATERALITY (HCC): Primary | ICD-10-CM

## 2022-07-21 DIAGNOSIS — C34.90 MALIGNANT NEOPLASM OF LUNG, UNSPECIFIED LATERALITY, UNSPECIFIED PART OF LUNG (HCC): ICD-10-CM

## 2022-07-21 NOTE — TELEPHONE ENCOUNTER
Communication from Dr Gregorio Palomo that patient needs referral to medical and radiation oncology for IIIA [facility] lung Cancer. Spoke with Dr Gregorio Palomo who also gives clare permission to schedule brain Mri for staging. I have spoken with patient and she is aware that I will work to get these appointments scheduled and will call her back once able to schedule. I have left messages with both oncology and radiation oncology to assure receipt of referrals.

## 2022-07-21 NOTE — TELEPHONE ENCOUNTER
Patient is scheduled to meet Dr Nathaniel Long on 7/26. She is agreeable to brain MRI on 8/2 and is aware that I will follow along to assure that radiation oncology appointment is scheduled. Patient assures no metallic objects to prevent MRI.

## 2022-07-22 DIAGNOSIS — D53.9 NUTRITIONAL ANEMIA, UNSPECIFIED: ICD-10-CM

## 2022-07-22 DIAGNOSIS — D50.0 IRON DEFICIENCY ANEMIA DUE TO CHRONIC BLOOD LOSS: Primary | ICD-10-CM

## 2022-07-22 DIAGNOSIS — C34.90 NON-SMALL CELL LUNG CANCER, UNSPECIFIED LATERALITY (HCC): ICD-10-CM

## 2022-07-26 ENCOUNTER — CLINICAL DOCUMENTATION (OUTPATIENT)
Dept: ONCOLOGY | Age: 72
End: 2022-07-26

## 2022-07-26 ENCOUNTER — OFFICE VISIT (OUTPATIENT)
Dept: ONCOLOGY | Age: 72
End: 2022-07-26
Payer: MEDICARE

## 2022-07-26 ENCOUNTER — HOSPITAL ENCOUNTER (OUTPATIENT)
Dept: LAB | Age: 72
Discharge: HOME | End: 2022-07-29
Payer: MEDICARE

## 2022-07-26 VITALS
BODY MASS INDEX: 21.16 KG/M2 | HEIGHT: 65 IN | DIASTOLIC BLOOD PRESSURE: 60 MMHG | SYSTOLIC BLOOD PRESSURE: 128 MMHG | OXYGEN SATURATION: 99 % | TEMPERATURE: 97.9 F | RESPIRATION RATE: 22 BRPM | HEART RATE: 60 BPM | WEIGHT: 127 LBS

## 2022-07-26 DIAGNOSIS — C34.90 NON-SMALL CELL LUNG CANCER, UNSPECIFIED LATERALITY (HCC): ICD-10-CM

## 2022-07-26 DIAGNOSIS — C34.90 NON-SMALL CELL LUNG CANCER, UNSPECIFIED LATERALITY (HCC): Primary | ICD-10-CM

## 2022-07-26 DIAGNOSIS — C78.1 MEDIASTINAL METASTASIS (HCC): ICD-10-CM

## 2022-07-26 DIAGNOSIS — D53.9 NUTRITIONAL ANEMIA, UNSPECIFIED: ICD-10-CM

## 2022-07-26 DIAGNOSIS — D50.0 IRON DEFICIENCY ANEMIA DUE TO CHRONIC BLOOD LOSS: ICD-10-CM

## 2022-07-26 LAB
ALBUMIN SERPL-MCNC: 3.3 G/DL (ref 3.2–4.6)
ALBUMIN/GLOB SERPL: 0.8 {RATIO} (ref 1.2–3.5)
ALP SERPL-CCNC: 62 U/L (ref 50–136)
ALT SERPL-CCNC: 18 U/L (ref 12–65)
ANION GAP SERPL CALC-SCNC: 7 MMOL/L (ref 7–16)
AST SERPL-CCNC: 14 U/L (ref 15–37)
BASOPHILS # BLD: 0.1 K/UL (ref 0–0.2)
BASOPHILS NFR BLD: 1 % (ref 0–2)
BILIRUB SERPL-MCNC: 0.4 MG/DL (ref 0.2–1.1)
BUN SERPL-MCNC: 14 MG/DL (ref 8–23)
CALCIUM SERPL-MCNC: 9.1 MG/DL (ref 8.3–10.4)
CEA SERPL-MCNC: 3.8 NG/ML (ref 0–3)
CHLORIDE SERPL-SCNC: 105 MMOL/L (ref 98–107)
CO2 SERPL-SCNC: 27 MMOL/L (ref 21–32)
CREAT SERPL-MCNC: 0.8 MG/DL (ref 0.6–1)
DIFFERENTIAL METHOD BLD: ABNORMAL
EOSINOPHIL # BLD: 0.1 K/UL (ref 0–0.8)
EOSINOPHIL NFR BLD: 1 % (ref 0.5–7.8)
ERYTHROCYTE [DISTWIDTH] IN BLOOD BY AUTOMATED COUNT: 17.2 % (ref 11.9–14.6)
FERRITIN SERPL-MCNC: 88 NG/ML (ref 8–388)
GLOBULIN SER CALC-MCNC: 4.1 G/DL (ref 2.3–3.5)
GLUCOSE SERPL-MCNC: 88 MG/DL (ref 65–100)
HCT VFR BLD AUTO: 32.8 % (ref 35.8–46.3)
HGB BLD-MCNC: 10.2 G/DL (ref 11.7–15.4)
IMM GRANULOCYTES # BLD AUTO: 0 K/UL (ref 0–0.5)
IMM GRANULOCYTES NFR BLD AUTO: 0 % (ref 0–5)
IRON SATN MFR SERPL: 17 %
IRON SERPL-MCNC: 54 UG/DL (ref 35–150)
LYMPHOCYTES # BLD: 1.3 K/UL (ref 0.5–4.6)
LYMPHOCYTES NFR BLD: 18 % (ref 13–44)
MCH RBC QN AUTO: 27.3 PG (ref 26.1–32.9)
MCHC RBC AUTO-ENTMCNC: 31.1 G/DL (ref 31.4–35)
MCV RBC AUTO: 87.9 FL (ref 79.6–97.8)
MONOCYTES # BLD: 0.6 K/UL (ref 0.1–1.3)
MONOCYTES NFR BLD: 8 % (ref 4–12)
NEUTS SEG # BLD: 5.4 K/UL (ref 1.7–8.2)
NEUTS SEG NFR BLD: 72 % (ref 43–78)
NRBC # BLD: 0 K/UL (ref 0–0.2)
PLATELET # BLD AUTO: 280 K/UL (ref 150–450)
PMV BLD AUTO: 8.9 FL (ref 9.4–12.3)
POTASSIUM SERPL-SCNC: 3.7 MMOL/L (ref 3.5–5.1)
PROT SERPL-MCNC: 7.4 G/DL (ref 6.3–8.2)
RBC # BLD AUTO: 3.73 M/UL (ref 4.05–5.2)
SODIUM SERPL-SCNC: 139 MMOL/L (ref 136–145)
TIBC SERPL-MCNC: 309 UG/DL (ref 250–450)
WBC # BLD AUTO: 7.5 K/UL (ref 4.3–11.1)

## 2022-07-26 PROCEDURE — 85025 COMPLETE CBC W/AUTO DIFF WBC: CPT

## 2022-07-26 PROCEDURE — 80053 COMPREHEN METABOLIC PANEL: CPT

## 2022-07-26 PROCEDURE — 99205 OFFICE O/P NEW HI 60 MIN: CPT | Performed by: INTERNAL MEDICINE

## 2022-07-26 PROCEDURE — 1123F ACP DISCUSS/DSCN MKR DOCD: CPT | Performed by: INTERNAL MEDICINE

## 2022-07-26 PROCEDURE — 82728 ASSAY OF FERRITIN: CPT

## 2022-07-26 PROCEDURE — 83540 ASSAY OF IRON: CPT

## 2022-07-26 PROCEDURE — 36415 COLL VENOUS BLD VENIPUNCTURE: CPT

## 2022-07-26 PROCEDURE — 82378 CARCINOEMBRYONIC ANTIGEN: CPT

## 2022-07-26 RX ORDER — ALBUTEROL SULFATE 90 UG/1
4 AEROSOL, METERED RESPIRATORY (INHALATION) PRN
Status: CANCELLED | OUTPATIENT
Start: 2022-09-28

## 2022-07-26 RX ORDER — DIPHENHYDRAMINE HYDROCHLORIDE 50 MG/ML
50 INJECTION INTRAMUSCULAR; INTRAVENOUS
Status: CANCELLED | OUTPATIENT
Start: 2022-10-12

## 2022-07-26 RX ORDER — FAMOTIDINE 10 MG/ML
20 INJECTION, SOLUTION INTRAVENOUS ONCE
Status: CANCELLED | OUTPATIENT
Start: 2022-10-05 | End: 2022-09-06

## 2022-07-26 RX ORDER — SODIUM CHLORIDE 9 MG/ML
INJECTION, SOLUTION INTRAVENOUS CONTINUOUS
Status: CANCELLED | OUTPATIENT
Start: 2022-10-12

## 2022-07-26 RX ORDER — FAMOTIDINE 10 MG/ML
20 INJECTION, SOLUTION INTRAVENOUS ONCE
Status: CANCELLED | OUTPATIENT
Start: 2022-09-21 | End: 2022-08-23

## 2022-07-26 RX ORDER — HEPARIN SODIUM (PORCINE) LOCK FLUSH IV SOLN 100 UNIT/ML 100 UNIT/ML
500 SOLUTION INTRAVENOUS PRN
Status: CANCELLED | OUTPATIENT
Start: 2022-09-07

## 2022-07-26 RX ORDER — SODIUM CHLORIDE 9 MG/ML
5-40 INJECTION INTRAVENOUS PRN
Status: CANCELLED | OUTPATIENT
Start: 2022-09-21

## 2022-07-26 RX ORDER — EPINEPHRINE 1 MG/ML
0.3 INJECTION, SOLUTION, CONCENTRATE INTRAVENOUS PRN
Status: CANCELLED | OUTPATIENT
Start: 2022-10-12

## 2022-07-26 RX ORDER — ALBUTEROL SULFATE 90 UG/1
4 AEROSOL, METERED RESPIRATORY (INHALATION) PRN
OUTPATIENT
Start: 2022-10-19

## 2022-07-26 RX ORDER — MEPERIDINE HYDROCHLORIDE 50 MG/ML
12.5 INJECTION INTRAMUSCULAR; INTRAVENOUS; SUBCUTANEOUS PRN
OUTPATIENT
Start: 2022-10-19

## 2022-07-26 RX ORDER — DIPHENHYDRAMINE HYDROCHLORIDE 50 MG/ML
50 INJECTION INTRAMUSCULAR; INTRAVENOUS ONCE
Status: CANCELLED | OUTPATIENT
Start: 2022-09-14 | End: 2022-08-16

## 2022-07-26 RX ORDER — FAMOTIDINE 10 MG/ML
20 INJECTION, SOLUTION INTRAVENOUS ONCE
Status: CANCELLED | OUTPATIENT
Start: 2022-09-28 | End: 2022-08-30

## 2022-07-26 RX ORDER — ONDANSETRON 2 MG/ML
8 INJECTION INTRAMUSCULAR; INTRAVENOUS ONCE
Status: CANCELLED | OUTPATIENT
Start: 2022-09-21 | End: 2022-08-23

## 2022-07-26 RX ORDER — SODIUM CHLORIDE 9 MG/ML
INJECTION, SOLUTION INTRAVENOUS CONTINUOUS
Status: CANCELLED | OUTPATIENT
Start: 2022-09-14

## 2022-07-26 RX ORDER — SODIUM CHLORIDE 9 MG/ML
5-250 INJECTION, SOLUTION INTRAVENOUS PRN
Status: CANCELLED | OUTPATIENT
Start: 2022-10-05

## 2022-07-26 RX ORDER — FAMOTIDINE 10 MG/ML
20 INJECTION, SOLUTION INTRAVENOUS
Status: CANCELLED | OUTPATIENT
Start: 2022-09-14

## 2022-07-26 RX ORDER — SODIUM CHLORIDE 9 MG/ML
5-250 INJECTION, SOLUTION INTRAVENOUS PRN
Status: CANCELLED | OUTPATIENT
Start: 2022-09-07

## 2022-07-26 RX ORDER — MEPERIDINE HYDROCHLORIDE 50 MG/ML
12.5 INJECTION INTRAMUSCULAR; INTRAVENOUS; SUBCUTANEOUS PRN
Status: CANCELLED | OUTPATIENT
Start: 2022-09-07

## 2022-07-26 RX ORDER — ONDANSETRON 2 MG/ML
8 INJECTION INTRAMUSCULAR; INTRAVENOUS ONCE
Status: CANCELLED | OUTPATIENT
Start: 2022-09-14 | End: 2022-08-16

## 2022-07-26 RX ORDER — SODIUM CHLORIDE 0.9 % (FLUSH) 0.9 %
5-40 SYRINGE (ML) INJECTION PRN
OUTPATIENT
Start: 2022-10-19

## 2022-07-26 RX ORDER — SODIUM CHLORIDE 9 MG/ML
5-250 INJECTION, SOLUTION INTRAVENOUS PRN
OUTPATIENT
Start: 2022-10-19

## 2022-07-26 RX ORDER — SODIUM CHLORIDE 9 MG/ML
INJECTION, SOLUTION INTRAVENOUS CONTINUOUS
Status: CANCELLED | OUTPATIENT
Start: 2022-10-05

## 2022-07-26 RX ORDER — HEPARIN SODIUM (PORCINE) LOCK FLUSH IV SOLN 100 UNIT/ML 100 UNIT/ML
500 SOLUTION INTRAVENOUS PRN
Status: CANCELLED | OUTPATIENT
Start: 2022-09-14

## 2022-07-26 RX ORDER — SODIUM CHLORIDE 9 MG/ML
5-250 INJECTION, SOLUTION INTRAVENOUS PRN
Status: CANCELLED | OUTPATIENT
Start: 2022-09-28

## 2022-07-26 RX ORDER — EPINEPHRINE 1 MG/ML
0.3 INJECTION, SOLUTION, CONCENTRATE INTRAVENOUS PRN
OUTPATIENT
Start: 2022-10-19

## 2022-07-26 RX ORDER — DIPHENHYDRAMINE HYDROCHLORIDE 50 MG/ML
50 INJECTION INTRAMUSCULAR; INTRAVENOUS
OUTPATIENT
Start: 2022-10-19

## 2022-07-26 RX ORDER — MEPERIDINE HYDROCHLORIDE 50 MG/ML
12.5 INJECTION INTRAMUSCULAR; INTRAVENOUS; SUBCUTANEOUS PRN
Status: CANCELLED | OUTPATIENT
Start: 2022-09-14

## 2022-07-26 RX ORDER — SODIUM CHLORIDE 9 MG/ML
5-40 INJECTION INTRAVENOUS PRN
OUTPATIENT
Start: 2022-10-19

## 2022-07-26 RX ORDER — DIPHENHYDRAMINE HYDROCHLORIDE 50 MG/ML
50 INJECTION INTRAMUSCULAR; INTRAVENOUS
Status: CANCELLED | OUTPATIENT
Start: 2022-09-21

## 2022-07-26 RX ORDER — DIPHENHYDRAMINE HYDROCHLORIDE 50 MG/ML
50 INJECTION INTRAMUSCULAR; INTRAVENOUS ONCE
Status: CANCELLED | OUTPATIENT
Start: 2022-09-28 | End: 2022-08-30

## 2022-07-26 RX ORDER — SODIUM CHLORIDE 9 MG/ML
INJECTION, SOLUTION INTRAVENOUS CONTINUOUS
Status: CANCELLED | OUTPATIENT
Start: 2022-09-28

## 2022-07-26 RX ORDER — FAMOTIDINE 10 MG/ML
20 INJECTION, SOLUTION INTRAVENOUS
Status: CANCELLED | OUTPATIENT
Start: 2022-09-28

## 2022-07-26 RX ORDER — PROCHLORPERAZINE MALEATE 10 MG
10 TABLET ORAL EVERY 6 HOURS PRN
Qty: 90 TABLET | Refills: 2 | Status: SHIPPED | OUTPATIENT
Start: 2022-07-26

## 2022-07-26 RX ORDER — ONDANSETRON 2 MG/ML
8 INJECTION INTRAMUSCULAR; INTRAVENOUS ONCE
Status: CANCELLED | OUTPATIENT
Start: 2022-10-12 | End: 2022-09-13

## 2022-07-26 RX ORDER — SODIUM CHLORIDE 9 MG/ML
INJECTION, SOLUTION INTRAVENOUS CONTINUOUS
OUTPATIENT
Start: 2022-10-19

## 2022-07-26 RX ORDER — SODIUM CHLORIDE 9 MG/ML
5-250 INJECTION, SOLUTION INTRAVENOUS PRN
Status: CANCELLED | OUTPATIENT
Start: 2022-10-12

## 2022-07-26 RX ORDER — DIPHENHYDRAMINE HYDROCHLORIDE 50 MG/ML
50 INJECTION INTRAMUSCULAR; INTRAVENOUS ONCE
Status: CANCELLED | OUTPATIENT
Start: 2022-10-12 | End: 2022-09-13

## 2022-07-26 RX ORDER — ACETAMINOPHEN 325 MG/1
650 TABLET ORAL
Status: CANCELLED | OUTPATIENT
Start: 2022-10-12

## 2022-07-26 RX ORDER — EPINEPHRINE 1 MG/ML
0.3 INJECTION, SOLUTION, CONCENTRATE INTRAVENOUS PRN
Status: CANCELLED | OUTPATIENT
Start: 2022-09-21

## 2022-07-26 RX ORDER — FAMOTIDINE 10 MG/ML
20 INJECTION, SOLUTION INTRAVENOUS ONCE
OUTPATIENT
Start: 2022-10-19 | End: 2022-09-20

## 2022-07-26 RX ORDER — ONDANSETRON 2 MG/ML
8 INJECTION INTRAMUSCULAR; INTRAVENOUS
Status: CANCELLED | OUTPATIENT
Start: 2022-09-14

## 2022-07-26 RX ORDER — EPINEPHRINE 1 MG/ML
0.3 INJECTION, SOLUTION, CONCENTRATE INTRAVENOUS PRN
Status: CANCELLED | OUTPATIENT
Start: 2022-10-05

## 2022-07-26 RX ORDER — SODIUM CHLORIDE 9 MG/ML
5-40 INJECTION INTRAVENOUS PRN
Status: CANCELLED | OUTPATIENT
Start: 2022-10-12

## 2022-07-26 RX ORDER — SODIUM CHLORIDE 9 MG/ML
5-250 INJECTION, SOLUTION INTRAVENOUS PRN
Status: CANCELLED | OUTPATIENT
Start: 2022-09-21

## 2022-07-26 RX ORDER — ONDANSETRON 2 MG/ML
8 INJECTION INTRAMUSCULAR; INTRAVENOUS ONCE
OUTPATIENT
Start: 2022-10-19 | End: 2022-09-20

## 2022-07-26 RX ORDER — HEPARIN SODIUM (PORCINE) LOCK FLUSH IV SOLN 100 UNIT/ML 100 UNIT/ML
500 SOLUTION INTRAVENOUS PRN
OUTPATIENT
Start: 2022-10-19

## 2022-07-26 RX ORDER — ALBUTEROL SULFATE 90 UG/1
4 AEROSOL, METERED RESPIRATORY (INHALATION) PRN
Status: CANCELLED | OUTPATIENT
Start: 2022-09-21

## 2022-07-26 RX ORDER — ACETAMINOPHEN 325 MG/1
650 TABLET ORAL
Status: CANCELLED | OUTPATIENT
Start: 2022-09-07

## 2022-07-26 RX ORDER — SODIUM CHLORIDE 9 MG/ML
5-40 INJECTION INTRAVENOUS PRN
Status: CANCELLED | OUTPATIENT
Start: 2022-09-28

## 2022-07-26 RX ORDER — ONDANSETRON 2 MG/ML
8 INJECTION INTRAMUSCULAR; INTRAVENOUS
Status: CANCELLED | OUTPATIENT
Start: 2022-09-28

## 2022-07-26 RX ORDER — DIPHENHYDRAMINE HYDROCHLORIDE 50 MG/ML
50 INJECTION INTRAMUSCULAR; INTRAVENOUS
Status: CANCELLED | OUTPATIENT
Start: 2022-09-28

## 2022-07-26 RX ORDER — ONDANSETRON 2 MG/ML
8 INJECTION INTRAMUSCULAR; INTRAVENOUS
Status: CANCELLED | OUTPATIENT
Start: 2022-09-07

## 2022-07-26 RX ORDER — SODIUM CHLORIDE 0.9 % (FLUSH) 0.9 %
5-40 SYRINGE (ML) INJECTION PRN
Status: CANCELLED | OUTPATIENT
Start: 2022-09-14

## 2022-07-26 RX ORDER — SODIUM CHLORIDE 9 MG/ML
INJECTION, SOLUTION INTRAVENOUS CONTINUOUS
Status: CANCELLED | OUTPATIENT
Start: 2022-09-07

## 2022-07-26 RX ORDER — FAMOTIDINE 10 MG/ML
20 INJECTION, SOLUTION INTRAVENOUS
Status: CANCELLED | OUTPATIENT
Start: 2022-09-21

## 2022-07-26 RX ORDER — SODIUM CHLORIDE 9 MG/ML
INJECTION, SOLUTION INTRAVENOUS CONTINUOUS
Status: CANCELLED | OUTPATIENT
Start: 2022-09-21

## 2022-07-26 RX ORDER — ACETAMINOPHEN 325 MG/1
650 TABLET ORAL
Status: CANCELLED | OUTPATIENT
Start: 2022-09-28

## 2022-07-26 RX ORDER — SODIUM CHLORIDE 0.9 % (FLUSH) 0.9 %
5-40 SYRINGE (ML) INJECTION PRN
Status: CANCELLED | OUTPATIENT
Start: 2022-09-07

## 2022-07-26 RX ORDER — FAMOTIDINE 10 MG/ML
20 INJECTION, SOLUTION INTRAVENOUS ONCE
Status: CANCELLED | OUTPATIENT
Start: 2022-09-14 | End: 2022-08-16

## 2022-07-26 RX ORDER — ALBUTEROL SULFATE 90 UG/1
4 AEROSOL, METERED RESPIRATORY (INHALATION) PRN
Status: CANCELLED | OUTPATIENT
Start: 2022-09-14

## 2022-07-26 RX ORDER — SODIUM CHLORIDE 9 MG/ML
5-40 INJECTION INTRAVENOUS PRN
Status: CANCELLED | OUTPATIENT
Start: 2022-10-05

## 2022-07-26 RX ORDER — ACETAMINOPHEN 325 MG/1
650 TABLET ORAL
Status: CANCELLED | OUTPATIENT
Start: 2022-09-14

## 2022-07-26 RX ORDER — MEPERIDINE HYDROCHLORIDE 50 MG/ML
12.5 INJECTION INTRAMUSCULAR; INTRAVENOUS; SUBCUTANEOUS PRN
Status: CANCELLED | OUTPATIENT
Start: 2022-09-28

## 2022-07-26 RX ORDER — ONDANSETRON 2 MG/ML
8 INJECTION INTRAMUSCULAR; INTRAVENOUS ONCE
Status: CANCELLED | OUTPATIENT
Start: 2022-09-07 | End: 2022-08-09

## 2022-07-26 RX ORDER — DIPHENHYDRAMINE HYDROCHLORIDE 50 MG/ML
50 INJECTION INTRAMUSCULAR; INTRAVENOUS ONCE
OUTPATIENT
Start: 2022-10-19 | End: 2022-09-20

## 2022-07-26 RX ORDER — FAMOTIDINE 10 MG/ML
20 INJECTION, SOLUTION INTRAVENOUS
OUTPATIENT
Start: 2022-10-19

## 2022-07-26 RX ORDER — ONDANSETRON 2 MG/ML
8 INJECTION INTRAMUSCULAR; INTRAVENOUS ONCE
Status: CANCELLED | OUTPATIENT
Start: 2022-09-28 | End: 2022-08-30

## 2022-07-26 RX ORDER — ONDANSETRON 2 MG/ML
8 INJECTION INTRAMUSCULAR; INTRAVENOUS ONCE
Status: CANCELLED | OUTPATIENT
Start: 2022-10-05 | End: 2022-09-06

## 2022-07-26 RX ORDER — ALBUTEROL SULFATE 90 UG/1
4 AEROSOL, METERED RESPIRATORY (INHALATION) PRN
Status: CANCELLED | OUTPATIENT
Start: 2022-10-12

## 2022-07-26 RX ORDER — FAMOTIDINE 10 MG/ML
20 INJECTION, SOLUTION INTRAVENOUS
Status: CANCELLED | OUTPATIENT
Start: 2022-10-12

## 2022-07-26 RX ORDER — HEPARIN SODIUM (PORCINE) LOCK FLUSH IV SOLN 100 UNIT/ML 100 UNIT/ML
500 SOLUTION INTRAVENOUS PRN
Status: CANCELLED | OUTPATIENT
Start: 2022-09-21

## 2022-07-26 RX ORDER — DIPHENHYDRAMINE HYDROCHLORIDE 50 MG/ML
50 INJECTION INTRAMUSCULAR; INTRAVENOUS ONCE
Status: CANCELLED | OUTPATIENT
Start: 2022-10-05 | End: 2022-09-06

## 2022-07-26 RX ORDER — DIPHENHYDRAMINE HYDROCHLORIDE 50 MG/ML
50 INJECTION INTRAMUSCULAR; INTRAVENOUS ONCE
Status: CANCELLED | OUTPATIENT
Start: 2022-09-21 | End: 2022-08-23

## 2022-07-26 RX ORDER — SODIUM CHLORIDE 0.9 % (FLUSH) 0.9 %
5-40 SYRINGE (ML) INJECTION PRN
Status: CANCELLED | OUTPATIENT
Start: 2022-10-12

## 2022-07-26 RX ORDER — MEPERIDINE HYDROCHLORIDE 50 MG/ML
12.5 INJECTION INTRAMUSCULAR; INTRAVENOUS; SUBCUTANEOUS PRN
Status: CANCELLED | OUTPATIENT
Start: 2022-10-12

## 2022-07-26 RX ORDER — SODIUM CHLORIDE 0.9 % (FLUSH) 0.9 %
5-40 SYRINGE (ML) INJECTION PRN
Status: CANCELLED | OUTPATIENT
Start: 2022-09-28

## 2022-07-26 RX ORDER — LIDOCAINE AND PRILOCAINE 25; 25 MG/G; MG/G
CREAM TOPICAL
Qty: 5 G | Refills: 2 | Status: SHIPPED | OUTPATIENT
Start: 2022-07-26 | End: 2022-08-02 | Stop reason: DRUGHIGH

## 2022-07-26 RX ORDER — HEPARIN SODIUM (PORCINE) LOCK FLUSH IV SOLN 100 UNIT/ML 100 UNIT/ML
500 SOLUTION INTRAVENOUS PRN
Status: CANCELLED | OUTPATIENT
Start: 2022-10-12

## 2022-07-26 RX ORDER — ONDANSETRON 2 MG/ML
8 INJECTION INTRAMUSCULAR; INTRAVENOUS
Status: CANCELLED | OUTPATIENT
Start: 2022-10-05

## 2022-07-26 RX ORDER — FAMOTIDINE 10 MG/ML
20 INJECTION, SOLUTION INTRAVENOUS
Status: CANCELLED | OUTPATIENT
Start: 2022-10-05

## 2022-07-26 RX ORDER — FAMOTIDINE 10 MG/ML
20 INJECTION, SOLUTION INTRAVENOUS ONCE
Status: CANCELLED | OUTPATIENT
Start: 2022-09-07 | End: 2022-08-09

## 2022-07-26 RX ORDER — ONDANSETRON 2 MG/ML
8 INJECTION INTRAMUSCULAR; INTRAVENOUS
Status: CANCELLED | OUTPATIENT
Start: 2022-09-21

## 2022-07-26 RX ORDER — ONDANSETRON 2 MG/ML
8 INJECTION INTRAMUSCULAR; INTRAVENOUS
OUTPATIENT
Start: 2022-10-19

## 2022-07-26 RX ORDER — EPINEPHRINE 1 MG/ML
0.3 INJECTION, SOLUTION, CONCENTRATE INTRAVENOUS PRN
Status: CANCELLED | OUTPATIENT
Start: 2022-09-14

## 2022-07-26 RX ORDER — MEPERIDINE HYDROCHLORIDE 50 MG/ML
12.5 INJECTION INTRAMUSCULAR; INTRAVENOUS; SUBCUTANEOUS PRN
Status: CANCELLED | OUTPATIENT
Start: 2022-10-05

## 2022-07-26 RX ORDER — EPINEPHRINE 1 MG/ML
0.3 INJECTION, SOLUTION, CONCENTRATE INTRAVENOUS PRN
Status: CANCELLED | OUTPATIENT
Start: 2022-09-28

## 2022-07-26 RX ORDER — HEPARIN SODIUM (PORCINE) LOCK FLUSH IV SOLN 100 UNIT/ML 100 UNIT/ML
500 SOLUTION INTRAVENOUS PRN
Status: CANCELLED | OUTPATIENT
Start: 2022-10-05

## 2022-07-26 RX ORDER — SODIUM CHLORIDE 9 MG/ML
5-250 INJECTION, SOLUTION INTRAVENOUS PRN
Status: CANCELLED | OUTPATIENT
Start: 2022-09-14

## 2022-07-26 RX ORDER — FAMOTIDINE 10 MG/ML
20 INJECTION, SOLUTION INTRAVENOUS ONCE
Status: CANCELLED | OUTPATIENT
Start: 2022-10-12 | End: 2022-09-13

## 2022-07-26 RX ORDER — ALBUTEROL SULFATE 90 UG/1
4 AEROSOL, METERED RESPIRATORY (INHALATION) PRN
Status: CANCELLED | OUTPATIENT
Start: 2022-10-05

## 2022-07-26 RX ORDER — SODIUM CHLORIDE 0.9 % (FLUSH) 0.9 %
5-40 SYRINGE (ML) INJECTION PRN
Status: CANCELLED | OUTPATIENT
Start: 2022-09-21

## 2022-07-26 RX ORDER — FAMOTIDINE 10 MG/ML
20 INJECTION, SOLUTION INTRAVENOUS
Status: CANCELLED | OUTPATIENT
Start: 2022-09-07

## 2022-07-26 RX ORDER — DIPHENHYDRAMINE HYDROCHLORIDE 50 MG/ML
50 INJECTION INTRAMUSCULAR; INTRAVENOUS ONCE
Status: CANCELLED | OUTPATIENT
Start: 2022-09-07 | End: 2022-08-09

## 2022-07-26 RX ORDER — MEPERIDINE HYDROCHLORIDE 50 MG/ML
12.5 INJECTION INTRAMUSCULAR; INTRAVENOUS; SUBCUTANEOUS PRN
Status: CANCELLED | OUTPATIENT
Start: 2022-09-21

## 2022-07-26 RX ORDER — DIPHENHYDRAMINE HYDROCHLORIDE 50 MG/ML
50 INJECTION INTRAMUSCULAR; INTRAVENOUS
Status: CANCELLED | OUTPATIENT
Start: 2022-09-07

## 2022-07-26 RX ORDER — SODIUM CHLORIDE 0.9 % (FLUSH) 0.9 %
5-40 SYRINGE (ML) INJECTION PRN
Status: CANCELLED | OUTPATIENT
Start: 2022-10-05

## 2022-07-26 RX ORDER — ACETAMINOPHEN 325 MG/1
650 TABLET ORAL
OUTPATIENT
Start: 2022-10-19

## 2022-07-26 RX ORDER — SODIUM CHLORIDE 9 MG/ML
5-40 INJECTION INTRAVENOUS PRN
Status: CANCELLED | OUTPATIENT
Start: 2022-09-07

## 2022-07-26 RX ORDER — HEPARIN SODIUM (PORCINE) LOCK FLUSH IV SOLN 100 UNIT/ML 100 UNIT/ML
500 SOLUTION INTRAVENOUS PRN
Status: CANCELLED | OUTPATIENT
Start: 2022-09-28

## 2022-07-26 RX ORDER — SODIUM CHLORIDE 9 MG/ML
5-40 INJECTION INTRAVENOUS PRN
Status: CANCELLED | OUTPATIENT
Start: 2022-09-14

## 2022-07-26 RX ORDER — EPINEPHRINE 1 MG/ML
0.3 INJECTION, SOLUTION, CONCENTRATE INTRAVENOUS PRN
Status: CANCELLED | OUTPATIENT
Start: 2022-09-07

## 2022-07-26 RX ORDER — ACETAMINOPHEN 325 MG/1
650 TABLET ORAL
Status: CANCELLED | OUTPATIENT
Start: 2022-09-21

## 2022-07-26 RX ORDER — ONDANSETRON 2 MG/ML
8 INJECTION INTRAMUSCULAR; INTRAVENOUS
Status: CANCELLED | OUTPATIENT
Start: 2022-10-12

## 2022-07-26 RX ORDER — DIPHENHYDRAMINE HYDROCHLORIDE 50 MG/ML
50 INJECTION INTRAMUSCULAR; INTRAVENOUS
Status: CANCELLED | OUTPATIENT
Start: 2022-09-14

## 2022-07-26 RX ORDER — ACETAMINOPHEN 325 MG/1
650 TABLET ORAL
Status: CANCELLED | OUTPATIENT
Start: 2022-10-05

## 2022-07-26 RX ORDER — ONDANSETRON 4 MG/1
4 TABLET, ORALLY DISINTEGRATING ORAL EVERY 8 HOURS PRN
Qty: 90 TABLET | Refills: 1 | Status: SHIPPED | OUTPATIENT
Start: 2022-07-26

## 2022-07-26 RX ORDER — DIPHENHYDRAMINE HYDROCHLORIDE 50 MG/ML
50 INJECTION INTRAMUSCULAR; INTRAVENOUS
Status: CANCELLED | OUTPATIENT
Start: 2022-10-05

## 2022-07-26 RX ORDER — ALBUTEROL SULFATE 90 UG/1
4 AEROSOL, METERED RESPIRATORY (INHALATION) PRN
Status: CANCELLED | OUTPATIENT
Start: 2022-09-07

## 2022-07-26 ASSESSMENT — PATIENT HEALTH QUESTIONNAIRE - PHQ9
SUM OF ALL RESPONSES TO PHQ9 QUESTIONS 1 & 2: 0
SUM OF ALL RESPONSES TO PHQ QUESTIONS 1-9: 0
2. FEELING DOWN, DEPRESSED OR HOPELESS: 0
SUM OF ALL RESPONSES TO PHQ QUESTIONS 1-9: 0
1. LITTLE INTEREST OR PLEASURE IN DOING THINGS: 0
SUM OF ALL RESPONSES TO PHQ QUESTIONS 1-9: 0
SUM OF ALL RESPONSES TO PHQ QUESTIONS 1-9: 0

## 2022-07-26 NOTE — PROGRESS NOTES
[facility] Abstract      Reason for Referral:  Non-small cell lung cancer    Referring Provider:  Dr. Tuyet Moreno, [facility]     Primary Care Provider:  Dr. Radha Calabrese, [facility])    Family History of Cancer/Hematologic Disorders:  No documented family history    Presenting Symptoms:   Presented to [facility] ED with COPD exacerbation    Narrative with recent with Results/Procedures/Biopsies and Dates completed:  Ms. Gale Maza is a 50-year-old  female with a medical history of COPD, asthma, oxygen dependent, CAD, closed right hip and femur fracture (2/22), subdural hematoma (3/22), MI, and thyroid disease. Surgical history includes bilateral cataract removal, right hip surgery, and right carotid endarterectomy. She is a former smoker of 50 years but quit in 2017. Denies alcohol use. On 6/9/22 patient presented to the [facility] ED with increased shortness of breath over several days. Admitted with exacerbation of COPD. CXR showed unchanged consolidation of left lower lobe lung. On 6/11 CT Chest PE protocol was performed which revealed no PE but a increase in the left lower lobe lung mass. She was discharged home on 6/12 with instruction to follow up with pulmonary within 1 week. On 6/24/22 she had a PET scan which showed known lung mass and left hilar and subcarinal mediastinal activity as well. On 6/28/22 she was seen in follow up with pulmonary. PET imaging reviewed. Recommended EBUS. Procedure was performed on 7/20/22. Pathology showed malignant cells 11L lymph node. Referral to medical and radiation oncology for evaluation and treatment recommendations. MRI Brain scheduled on 8/2 and radiation oncology consult on 7/29.      6/9/22 [facility] ED CXR  FINDINGS: The lungs are hyperexpanded, consistent with emphysematous changes on the prior CT chest. Areas of nodular consolidation in the left lower lobe are unchanged.  No acute infiltrate or pulmonary edema is seen. The heart size, mediastinal contour and pulmonary vasculature are normal. There are coronary artery calcifications or stents. The thorax or pleural effusion is seen. IMPRESSION:  1. Emphysema. 2. Coronary artery disease. 3. Unchanged nodular consolidation in the left lower lobe. 6/11/22 [facility] CT Chest PE protocol  Findings: There is no pleural or pericardial effusion. The heart and great vessels are unremarkable. There are no filling defects within the pulmonary arteries to suggest pulmonary emboli. Moderate emphysematous changes are present. Patchy interstitial opacity is present within the right upper lobe  anteriorly. Similar but less impressive findings are newly visualized within the left upper lobe anteriorly. Again noted is the ill-defined masslike opacity within the left lower lobe measuring approximately 2.0 x 3.6 cm previously measured at approximately 1.7 x 2.8 cm. Also is a ground glass opacity within the left upper lobe measuring 1.5 cm, unchanged when remeasured on the previous study. A left hilar lymph node  measures 1.4 cm in short axis, unchanged. There is a small left Bochdalek hernia containing fat. Imaging of the upper abdomen is unremarkable. There are compression fractures within the thoracic and lumbar spine with mild height loss. The fractures at T10 and T11 has occurred in the interim. IMPRESSION:  1. No evidence of pulmonary embolism. 2. Interval increase in left lower lobe mass suspicious for bronchogenic carcinoma. A left hilar lymph node also is enlarged concerning for metastatic adenopathy. 3. Newly visualized interstitial opacities within the upper lobes, right greater than left, most likely infectious. 4. Stable left lower lobe groundglass opacity. 5. Interval compression fractures with mild height loss at T10 and T11.    6/24/22 [facility] PET  FINDINGS:  NECK/CHEST:  No suspicious activity is seen in the neck.  Only minimal nonfocal activity is seen about left posterior facets in the mid cervical spine likely related to benign degenerative changes. No clear aggressive osseous lesion is suggested on limited CT images. The most clear suspicious activity is seen associated with a large heterogeneous lesion in the left lower lobe demonstrating spiculated appearing components best appreciated on axial image 92 measuring 4.4 cm x 2.3 cm in greatest transverse dimensions and with a maximum SUV of 11.2 g/mL. Malignancy is not excluded given the appearance. Additional focal appearing activity is seen in the left hilum on  pet/CT image 85 with a maximum SUV of 9.1 g/mL and in the subcarinal mediastinum on axial image 84 with a maximum SUV of 6.3 g/mL concerning for left hilar and subcarinal mediastinal diego metastases, respectively. Additional mild activity is seen in the mediastinum and left hilum on pet/CT image 76 which does appear to correspond to small prevascular and left hilar lymph nodes seen on axial CT  image 39 of the prior the chest dated 6/11/2022 which are suspicious for additional early diego metastases. No clearly suspicious activity is otherwise seen in the chest. Mild to moderate activity is seen in the medial right upper lobe on pet/CT image 56 with a maximum SUV of 4.8 g/mL. Although the degree of activity is suspicious, this appears to correspond to a fluid containing cystic structure in the right upper lobe with a CT appearance being more suggestive of an inflammatory process. Additional soft tissue activity is seen over the lateral scapula which is most consistent with benign muscle activity. ABDOMEN/PELVIS:  The only potentially suspicious activity is skin activity involving the left  proximal medial thigh seen on pet/CT image 257 with a maximum SUV of 6.3 g/mL which appears to be associated with focal skin thickening. This is not felt to be related to the patient's lung cancer.  However, a dermal neoplasm or focal  inflammatory process is not excluded given the appearance. This should be further assessed with direct visualization. No suspicious activity is otherwise seen below the diaphragms. Activity is seen adjacent to the right femoral neck. However, this appears to correspond to heterotopic bone formation from hip  surgery at this level without aggressive features suggested therefore is not worrisome. No focal hepatic activity is seen. No contour deforming, or hypermetabolic adrenal lesion is seen. Advanced atherosclerotic calcification is seen of the abdominal aorta. IMPRESSION:  1. Heterogeneous left lower lobe mass demonstrating irregular spiculated components and measuring 4.4 cm x 2.3 cm in greatest transverse dimensions and with a maximum SUV of 11.2 g/mL. Malignancy is not excluded given the appearance. Further evaluation as clinically indicated is recommended. 2. Additional left hilar and subcarinal mediastinal activity concerning for diego metastases. Additional mild activity is seen within a prevascular mediastinal lymph node an additional left hilar lymph nodes for which additional early diego metastases are not excluded. 3. Focal activity associated with focal skin thickening in the most medial, proximal left thigh seen on pet/CT image 257. This is unlikely to be related to potential lung cancer although could represent an additional dermal neoplasm or focal abnormal inflammatory process otherwise. This should be clinically  evaluated with direct visualization. This has been marked on image to assist with clinical correlation. 4. Mild activity associated with a cystic abnormality in the medial right upper lobe. Although the degree of activity is suspicious (maximum SUV of 4.8 g/mL) the CT features are more consistent with a potential inflammatory process. Attention on follow-up studies is recommended.     7/20/22 [facility] Pathology EBUS  DIAGNOSIS   11L Lymph Node, Fine Needle Aspiration:

## 2022-07-26 NOTE — PROGRESS NOTES
7/26/2022 - Pt seen by Dr. Shakila Flores for initial consult for newly diagnosed: Lung adenocarcinoma. CT scan reviewed, discussed recommended tx plan: carbo/taxol weekly x6 weeks with concurrent radiation; then followed by a break for ~ 4 weeks with a CT scan, then Durvalumab every 2 or every 4 weeks x1 year. MRI scheduled for 8/2; port placement scheduled, as well as Chemo Ed/FC. Home Rx: nausea meds + EMLA cream sent to pt's pharmacy. Pt on supplemental continuous oxygen, has dx: COPD. Port notebook shown to pt. Reviewed nausea meds with pt/pt's spouse. Notification sent to [employer] for chemo approval, as well as [employer] notified of tentative start date for chemo/RT. Encouraged to call with questions. Navigation will continue to follow.

## 2022-07-26 NOTE — PATIENT INSTRUCTIONS
Patient Instructions from Today's Visit    Reason for Visit:  Initial visit for new dx: lung cancer    Diagnosis Information:  [email or web address]-[email or web address]-answers-[email or web address]    Plan:  You have Stage III Lung Adenocarcinoma, no metastasis  Reviewed your CT scan and discussed recommended treatment option: Chemotherapy (Carboplatin/Taxol) for 6 weeks and concurrent radiation, then ~ 4 week break with a CT scan; then immunotherapy (Durvalumab) every 2 or every 4 weeks for 1 year. MRI needs to be completed. Port placement needs to be scheduled. Home nausea meds will be sent to your pharmacy. Chemo education and Financial Counseling will be scheduled prior to your next OV. Follow Up:   In ~ 3 weeks    Recent Lab Results:  Hospital Outpatient Visit on 07/26/2022   Component Date Value Ref Range Status    WBC 07/26/2022 7.5  4.3 - 11.1 K/uL Final    RBC 07/26/2022 3.73 (A) 4.05 - 5.2 M/uL Final    Hemoglobin 07/26/2022 10.2 (A) 11.7 - 15.4 g/dL Final    Hematocrit 07/26/2022 32.8 (A) 35.8 - 46.3 % Final    MCV 07/26/2022 87.9  79.6 - 97.8 FL Final    MCH 07/26/2022 27.3  26.1 - 32.9 PG Final    MCHC 07/26/2022 31.1 (A) 31.4 - 35.0 g/dL Final    RDW 07/26/2022 17.2 (A) 11.9 - 14.6 % Final    Platelets 21/06/9433 280  150 - 450 K/uL Final    MPV 07/26/2022 8.9 (A) 9.4 - 12.3 FL Final    nRBC 07/26/2022 0.00  0.0 - 0.2 K/uL Final    **Note: Absolute NRBC parameter is now reported with Hemogram**    Seg Neutrophils 07/26/2022 72  43 - 78 % Final    Lymphocytes 07/26/2022 18  13 - 44 % Final    Monocytes 07/26/2022 8  4.0 - 12.0 % Final    Eosinophils % 07/26/2022 1  0.5 - 7.8 % Final    Basophils 07/26/2022 1  0.0 - 2.0 % Final    Immature Granulocytes 07/26/2022 0  0.0 - 5.0 % Final    Segs Absolute 07/26/2022 5.4  1.7 - 8.2 K/UL Final    Absolute Lymph # 07/26/2022 1.3  0.5 - 4.6 K/UL Final    Absolute Mono # 07/26/2022 0.6  0.1 - 1.3 K/UL Final    Absolute Eos # 07/26/2022 0.1  0.0 - 0.8 K/UL Final    Basophils Absolute 07/26/2022 0.1  0.0 - 0.2 K/UL Final    Absolute Immature Granulocyte 07/26/2022 0.0  0.0 - 0.5 K/UL Final    Differential Type 07/26/2022 AUTOMATED    Final     Treatment Summary has been discussed and given to patient: no    -------------------------------------------------------------------------------------------    Please call our office at [phone] if you have any  of the following symptoms:   Fever of 100.5 or greater  Chills  Shortness of breath  Swelling or pain in one leg    After office hours an answering service is available and will contact a provider for emergencies or if you are experiencing any of the above symptoms. Patient does express an interest in My Chart. My Chart log in information explained on the after visit summary printout at the . [facility] [address] desk. Diego Flores RN  Solid Tumor Nurse Navigator  [phone] cell phone   For Urgent Matters (after hours and weekends), please call Triage at [phone]. For Emergencies, please call 911. Renzo Griffith RN  Nurse Navigator  [phone]  For Urgent Matters (after hours and weekends), please call Triage at [phone]. For Emergencies, please call 911.

## 2022-07-26 NOTE — PROGRESS NOTES
[facility] Hematology & Oncology: Office Visit New Patient H/P    Chief Complaint:        History of Present Illness:  Reason for Referral:  Non-small cell lung cancer     Referring Provider:  Dr. Juan Davidson, [facility] Pulmonary and Critical Care     Primary Care Provider:  Dr. Sanjana West, [facility])     Family History of Cancer/Hematologic Disorders:  No documented family history     Presenting Symptoms:   Presented to [facility] ED with COPD exacerbation     Ms. Magui Cardoza is a 66 y.o.  female with a medical history of COPD, asthma, oxygen dependent, CAD, closed right hip and femur fracture (2/22), subdural hematoma (3/22), MI, and thyroid disease. Surgical history includes bilateral cataract removal, right hip surgery, and right carotid endarterectomy. She is a former smoker of 50 years but quit in 2017. Denies alcohol use. On 6/9/22 patient presented to the [facility] ED with increased shortness of breath over several days. Admitted with exacerbation of COPD. CXR showed unchanged consolidation of left lower lobe lung. On 6/11 CT Chest PE protocol was performed which revealed no PE but a increase in the left lower lobe lung mass. She was discharged home on 6/12 with instruction to follow up with pulmonary within 1 week. On 6/24/22 she had a PET scan which showed known lung mass and left hilar and subcarinal mediastinal activity as well. On 6/28/22 she was seen in follow up with pulmonary. PET imaging reviewed. Recommended EBUS. Procedure was performed on 7/20/22. Pathology showed malignant cells 11L lymph node. Referral to medical and radiation oncology for evaluation and treatment recommendations.       MRI Brain scheduled on 8/2 and radiation oncology consult on 7/29.       6/9/22 [facility] ED CXR  FINDINGS: The lungs are hyperexpanded, consistent with emphysematous changes on the prior CT chest. Areas of nodular consolidation in the left lower lobe are unchanged. No acute infiltrate or pulmonary edema is seen. The heart size, mediastinal contour and pulmonary vasculature are normal. There are coronary artery calcifications or stents. The thorax or pleural effusion is seen. IMPRESSION:  1. Emphysema. 2. Coronary artery disease. 3. Unchanged nodular consolidation in the left lower lobe. 6/11/22 [facility] CT Chest PE protocol  Findings: There is no pleural or pericardial effusion. The heart and great vessels are unremarkable. There are no filling defects within the pulmonary arteries to suggest pulmonary emboli. Moderate emphysematous changes are present. Patchy interstitial opacity is present within the right upper lobe  anteriorly. Similar but less impressive findings are newly visualized within the left upper lobe anteriorly. Again noted is the ill-defined masslike opacity within the left lower lobe measuring approximately 2.0 x 3.6 cm previously measured at approximately 1.7 x 2.8 cm. Also is a ground glass opacity within the left upper lobe measuring 1.5 cm, unchanged when remeasured on the previous study. A left hilar lymph node  measures 1.4 cm in short axis, unchanged. There is a small left Bochdalek hernia containing fat. Imaging of the upper abdomen is unremarkable. There are compression fractures within the thoracic and lumbar spine with mild height loss. The fractures at T10 and T11 has occurred in the interim. IMPRESSION:  1. No evidence of pulmonary embolism. 2. Interval increase in left lower lobe mass suspicious for bronchogenic carcinoma. A left hilar lymph node also is enlarged concerning for metastatic adenopathy. 3. Newly visualized interstitial opacities within the upper lobes, right greater than left, most likely infectious. 4. Stable left lower lobe groundglass opacity. 5.  Interval compression fractures with mild height loss at T10 and T11.     6/24/22 [facility] PET  FINDINGS:  NECK/CHEST:  No suspicious activity is seen in the neck. Only minimal nonfocal activity is seen about left posterior facets in the mid cervical spine likely related to benign degenerative changes. No clear aggressive osseous lesion is suggested on limited CT images. The most clear suspicious activity is seen associated with a large heterogeneous lesion in the left lower lobe demonstrating spiculated appearing components best appreciated on axial image 92 measuring 4.4 cm x 2.3 cm in greatest transverse dimensions and with a maximum SUV of 11.2 g/mL. Malignancy is not excluded given the appearance. Additional focal appearing activity is seen in the left hilum on  pet/CT image 85 with a maximum SUV of 9.1 g/mL and in the subcarinal mediastinum on axial image 84 with a maximum SUV of 6.3 g/mL concerning for left hilar and subcarinal mediastinal diego metastases, respectively. Additional mild activity is seen in the mediastinum and left hilum on pet/CT image 76 which does appear to correspond to small prevascular and left hilar lymph nodes seen on axial CT  image 39 of the prior the chest dated 6/11/2022 which are suspicious for additional early diego metastases. No clearly suspicious activity is otherwise seen in the chest. Mild to moderate activity is seen in the medial right upper lobe on pet/CT image 56 with a maximum SUV of 4.8 g/mL. Although the degree of activity is suspicious, this appears to correspond to a fluid containing cystic structure in the right upper lobe with a CT appearance being more suggestive of an inflammatory process. Additional soft tissue activity is seen over the lateral scapula which is most consistent with benign muscle activity. ABDOMEN/PELVIS:  The only potentially suspicious activity is skin activity involving the left  proximal medial thigh seen on pet/CT image 257 with a maximum SUV of 6.3 g/mL which appears to be associated with focal skin thickening. This is not felt to be related to the patient's lung cancer.  However, a dermal neoplasm or focal  inflammatory process is not excluded given the appearance. This should be further assessed with direct visualization. No suspicious activity is otherwise seen below the diaphragms. Activity is seen adjacent to the right femoral neck. However, this appears to correspond to heterotopic bone formation from hip  surgery at this level without aggressive features suggested therefore is not worrisome. No focal hepatic activity is seen. No contour deforming, or hypermetabolic adrenal lesion is seen. Advanced atherosclerotic calcification is seen of the abdominal aorta. IMPRESSION:  1. Heterogeneous left lower lobe mass demonstrating irregular spiculated components and measuring 4.4 cm x 2.3 cm in greatest transverse dimensions and with a maximum SUV of 11.2 g/mL. Malignancy is not excluded given the appearance. Further evaluation as clinically indicated is recommended. 2. Additional left hilar and subcarinal mediastinal activity concerning for diego metastases. Additional mild activity is seen within a prevascular mediastinal lymph node an additional left hilar lymph nodes for which additional early diego metastases are not excluded. 3. Focal activity associated with focal skin thickening in the most medial, proximal left thigh seen on pet/CT image 257. This is unlikely to be related to potential lung cancer although could represent an additional dermal neoplasm or focal abnormal inflammatory process otherwise. This should be clinically  evaluated with direct visualization. This has been marked on image to assist with clinical correlation. 4. Mild activity associated with a cystic abnormality in the medial right upper lobe. Although the degree of activity is suspicious (maximum SUV of 4.8 g/mL) the CT features are more consistent with a potential inflammatory process. Attention on follow-up studies is recommended.      7/20/22 [facility] Pathology EBUS  DIAGNOSIS  11L Lymph Node, Fine Needle Aspiration:      MALIGNANT CELLS PRESENT. Cell block: Malignant cells present. Immunohistochemical findings will follow in an addendum. Notes from Referring Provider:  None     Other Pertinent Information:  Covid vaccination - 1/28/21, 2/18/21, and 9/25/21     Radiation oncology consult with Dr. Venetta Severs scheduled for 7/29/22 at 1 pm.     MRI Brain is scheduled for 8/2/22 at 8 am.         Review of Systems:  Constitutional Denies fever or chills. Denies weight loss or appetite changes. Denies fatigue. Denies anorexia. HEENT Denies trauma, bluring vision, hearing loss, ear pain, nosebleeds, sore throat, neck pain and ear discharge. Skin Denies lesions or rashes. Lungs Denies shortness of breath, cough, sputum production or hemoptysis. Cardiovascular Denies chest pain, palpitations, orthopnea, claudication and leg swelling. Gastrointestinal Denies nausea, vomiting, bowel changes. Denies bloody or black stools. Denies abdominal pain.  Denies dysuria, frequency or hesitancy of urination   Neuro Denies headaches, visual changes or ataxia. Denies dizziness, tingling, tremors, sensory change, speech change, focal weakness and headaches. Hematology Denies nasal/gum bleeding, denies easy bruise   Endo Denies heat/cold intolerance, denies diabetes. MSK Denies back pain, swollen legs, myalgias and falls. Psychiatric/Behavioral Denies depression and substance abuse. The patient is not nervous/anxious. Allergies   Allergen Reactions    Promethazine Nausea And Vomiting and Other (See Comments)     Severe vomiting      Past Medical History:   Diagnosis Date    Acute respiratory failure with hypoxia (HCC) 08/22/2013    Asthma     CAD (coronary artery disease)     Followed by [facility].     Chest pain 05/31/2019    pt denies any reent CP or increase SOB    Closed right hip fracture ([address] 02/04/2022    COPD with acute lower respiratory infection ([address] 02/10/2022    severe COPD with centrilobular emphysema, on nocturnal O2.     EKG, abnormal 02/15/2022    Femur fracture, right ([address] 2022    Former cigarette smoker     H/O heart artery stent     several stents- last stents placed 2022    History of MI (myocardial infarction)     Lung mass     Multiple closed anterior-posterior compression fractures of pelvis ([address] 2022    Other fracture of right femur, initial encounter for closed fracture ([address] 2022    Oxygen dependent     2L NC continuous    Post-traumatic subdural hematoma (HCC) 03/15/2022    SDH (subdural hematoma) ([address] 2022    hx of    Thyroid disease      Past Surgical History:   Procedure Laterality Date    BRONCHOSCOPY N/A 2022    BRONCHOSCOPY ENDOBRONCHIAL ULTRASOUND performed by Lindsey Katz MD at [facility] ENDOSCOPY    CAROTID ENDARTERECTOMY Right     CATARACT REMOVAL Bilateral     HIP SURGERY Right     WISDOM TOOTH EXTRACTION      as a teenagers     Family History   Adopted: Yes   Problem Relation Age of Onset    No Known Problems Mother     No Known Problems Father      Social History     Socioeconomic History    Marital status:      Spouse name: Not on file    Number of children: Not on file    Years of education: Not on file    Highest education level: Not on file   Occupational History    Not on file   Tobacco Use    Smoking status: Former     Packs/day: 0.50     Years: 50.00     Pack years: 25.00     Types: Cigarettes     Quit date:      Years since quittin.5    Smokeless tobacco: Never   Substance and Sexual Activity    Alcohol use: Not Currently    Drug use: Not on file    Sexual activity: Not on file   Other Topics Concern    Not on file   Social History Narrative    Not on file     Social Determinants of Health     Financial Resource Strain: Not on file   Food Insecurity: Not on file   Transportation Needs: Not on file   Physical Activity: Not on file   Stress: Not on file   Social Connections: Not on file   Intimate Partner Violence: Not on file   Housing Stability: Not on file     Current Outpatient Medications   Medication Sig Dispense Refill    ondansetron (ZOFRAN ODT) 4 MG disintegrating tablet Take 1 tablet by mouth every 8 hours as needed for Nausea or Vomiting 90 tablet 1    prochlorperazine (COMPAZINE) 10 MG tablet Take 1 tablet by mouth every 6 hours as needed (chemo-induced nausea) 90 tablet 2    traMADol (ULTRAM) 50 MG tablet Take 50 mg by mouth every evening.       predniSONE (DELTASONE) 10 MG tablet Take 10 mg by mouth daily (with breakfast)      Pseudoephedrine-guaiFENesin (MUCINEX D PO) Take by mouth in the morning and at bedtime      tiotropium (SPIRIVA RESPIMAT) 2.5 MCG/ACT AERS inhaler Inhale 2 puffs into the lungs daily 1 each 11    torsemide (DEMADEX) 10 MG tablet Take 10 mg by mouth daily      acetaminophen (TYLENOL) 325 MG tablet Take by mouth every 4 hours as needed      aspirin 81 MG chewable tablet Take 81 mg by mouth daily      atorvastatin (LIPITOR) 80 MG tablet Take 80 mg by mouth at bedtime TAKE 1 TABLET BY MOUTH DAILY      budesonide-formoterol (SYMBICORT) 160-4.5 MCG/ACT AERO Inhale 2 puffs into the lungs 2 times daily      cetirizine (ZYRTEC) 10 MG tablet Take 10 mg by mouth daily as needed      clopidogrel (PLAVIX) 75 MG tablet Take 75 mg by mouth daily      escitalopram (LEXAPRO) 10 MG tablet Take 10 mg by mouth daily      ezetimibe (ZETIA) 10 MG tablet Take 10 mg by mouth at bedtime      methIMAzole (TAPAZOLE) 10 MG tablet Take 10 mg by mouth daily      metoprolol succinate (TOPROL XL) 100 MG extended release tablet Take 100 mg by mouth daily      nitroGLYCERIN (NITROSTAT) 0.4 MG SL tablet Place 0.4 mg under the tongue 3 times daily as needed      pantoprazole (PROTONIX) 40 MG tablet Take 40 mg by mouth 2 times daily      Roflumilast (DALIRESP) 500 MCG tablet Take 500 mcg by mouth nightly      lidocaine-prilocaine (EMLA) 2.5-2.5 % cream Apply topically to port 30 min prior to needle stick 5 g 2 LORazepam (ATIVAN) 0.5 MG tablet Take 1 tablet by mouth 3 times daily as needed for Anxiety for up to 30 days. (Patient not taking: Reported on 7/26/2022) 12 tablet 0    butalbital-acetaminophen-caffeine (FIORICET, ESGIC) -40 MG per tablet Take 1 tablet by mouth every 6 hours as needed (Patient not taking: Reported on 7/26/2022)       No current facility-administered medications for this visit. OBJECTIVE:  /60   Pulse 60   Temp 97.9 °F (36.6 °C) (Oral)   Resp 22   Ht 5' 5\" (1.651 m)   Wt 127 lb (57.6 kg)   SpO2 99%   BMI 21.13 kg/m²     Physical Exam:  Constitutional: Oriented to person, place, and time. Well-developed and well-nourished. HEENT: Normocephalic and atraumatic. Oropharynx is clear and moist.   Conjunctivae and EOM are normal. Pupils are equal, round, and reactive to light. No scleral icterus. Neck supple. No JVD present. No tracheal deviation present. No thyromegaly present. Lymph node No palpable submandibular, cervical, supraclavicular, axillary and inguinal lymph nodes. Skin Warm and dry. No bruising and no rash noted. No erythema. No pallor. Respiratory Effort normal and breath sounds normal.  No respiratory distress. No wheezes. No rales. No tenderness. CVS Normal rate, regular rhythm and normal heart sounds. Exam reveals no gallop, no friction and no rub. No murmur heard. Abdomen Soft. Bowel sounds are normal. Exhibits no distension. There is no tenderness. There is no rebound and no guarding. Neuro Normal reflexes. No cranial nerve deficit. Exhibits normal muscle tone, 5 of 5 strength of all extremities. MSK Normal range of motion in general.  No edema and no tenderness.    Psych Normal mood, affect, behavior, judgment and thought content      Labs:  Recent Results (from the past 24 hour(s))   CBC with Auto Differential    Collection Time: 07/26/22  1:49 PM   Result Value Ref Range    WBC 7.5 4.3 - 11.1 K/uL    RBC 3.73 (L) 4.05 - 5.2 M/uL    Hemoglobin 10.2 (L) 11.7 - 15.4 g/dL    Hematocrit 32.8 (L) 35.8 - 46.3 %    MCV 87.9 79.6 - 97.8 FL    MCH 27.3 26.1 - 32.9 PG    MCHC 31.1 (L) 31.4 - 35.0 g/dL    RDW 17.2 (H) 11.9 - 14.6 %    Platelets 180 149 - 655 K/uL    MPV 8.9 (L) 9.4 - 12.3 FL    nRBC 0.00 0.0 - 0.2 K/uL    Seg Neutrophils 72 43 - 78 %    Lymphocytes 18 13 - 44 %    Monocytes 8 4.0 - 12.0 %    Eosinophils % 1 0.5 - 7.8 %    Basophils 1 0.0 - 2.0 %    Immature Granulocytes 0 0.0 - 5.0 %    Segs Absolute 5.4 1.7 - 8.2 K/UL    Absolute Lymph # 1.3 0.5 - 4.6 K/UL    Absolute Mono # 0.6 0.1 - 1.3 K/UL    Absolute Eos # 0.1 0.0 - 0.8 K/UL    Basophils Absolute 0.1 0.0 - 0.2 K/UL    Absolute Immature Granulocyte 0.0 0.0 - 0.5 K/UL    Differential Type AUTOMATED     Comprehensive Metabolic Panel    Collection Time: 07/26/22  1:49 PM   Result Value Ref Range    Sodium 139 136 - 145 mmol/L    Potassium 3.7 3.5 - 5.1 mmol/L    Chloride 105 98 - 107 mmol/L    CO2 27 21 - 32 mmol/L    Anion Gap 7 7 - 16 mmol/L    Glucose 88 65 - 100 mg/dL    BUN 14 8 - 23 MG/DL    Creatinine 0.80 0.6 - 1.0 MG/DL    GFR African American >60 >60 ml/min/1.73m2    GFR Non- >60 >60 ml/min/1.73m2    Calcium 9.1 8.3 - 10.4 MG/DL    Total Bilirubin 0.4 0.2 - 1.1 MG/DL    ALT 18 12 - 65 U/L    AST 14 (L) 15 - 37 U/L    Alk Phosphatase 62 50 - 136 U/L    Total Protein 7.4 6.3 - 8.2 g/dL    Albumin 3.3 3.2 - 4.6 g/dL    Globulin 4.1 (H) 2.3 - 3.5 g/dL    Albumin/Globulin Ratio 0.8 (L) 1.2 - 3.5     CEA    Collection Time: 07/26/22  1:49 PM   Result Value Ref Range    CEA 3.8 (H) 0.0 - 3.0 ng/mL   Ferritin    Collection Time: 07/26/22  1:49 PM   Result Value Ref Range    Ferritin 88 8 - 388 NG/ML   Transferrin Saturation    Collection Time: 07/26/22  1:49 PM   Result Value Ref Range    Iron 54 35 - 150 ug/dL    TIBC 309 250 - 450 ug/dL    TRANSFERRIN SATURATION 17 (L) >20 %       Imaging:  No results found for this or any previous included: face-to-face time evaluating the patient as well as additional non-face-to-face time spent on: Preparing to see the patient by obtaining and reviewing previous test results, records and medical history, Performing a medically appropriate history and exam and documenting relevant clinical information for this visit, Counseling and educating patient and family, Ordering medications, Communicating with other health care professionals and Referring patient to another health care provider. Elements of this note have been dictated via voice recognition software. Text and phrases may be limited by the accuracy and autoconversion of the software. The chart has been reviewed, but errors may still be present. Chrystine Sandhoff, M.D.   [address]  Office : [phone]  Fax : [phone]

## 2022-07-26 NOTE — ONCOLOGY
START ON PATHWAY REGIMEN - Non-Small Cell Lung    [id number]        Paclitaxel (Taxol)       Carboplatin     **Always confirm dose/schedule in your pharmacy ordering system**    Patient Characteristics:  Preoperative or Nonsurgical Candidate (Clinical Staging), Stage III - Nonsurgical Candidate (Nonsquamous and Squamous), PS = 0, 1  Therapeutic Status: Preoperative or Nonsurgical Candidate (Clinical Staging)  AJCC T Category: cT2b  AJCC N Category: cN2  AJCC M Category: cM0  AJCC 8 Stage Grouping: IIIA  ECOG Performance Status: 1  Intent of Therapy:  Curative Intent, Discussed with Patient

## 2022-07-28 NOTE — CONSULTS
Patient: Seng Barron MRN: [id number]  SSN: [id number]    YOB: 1950  Age: 66 y.o. Sex: female      Other Providers:  Dr. Kaushik Marrero, Dr. Dina Kruger: Lung nodule    DIAGNOSIS: Stage III NSCLC of the JHONY    PREVIOUS RADIATION TREATMENT:  Facial radiation at the age of 24 for seborrheic keratosis    HISTORY OF PRESENT ILLNESS:  Seng Barron is a 66 y.o. female who I am seeing at the request of Dr. Kaushik Marrero. Ms. Waldemar Hodgkins has a history of very severe COPD with centrilobular emphysema on nocturnal O2. She was found to have a spiculated nodule in the JHONY on CT chest 1/30/2020 during workup for lung transplantation. PET/CT 2/20/2020 demonstrated faint uptake associated with the spiculated nodular opacity in the JHONY as well as small nodular opacities in the LLL not clearly hypermetabolic and an indeterminate 2.1cm nodular density in the R breast which was ultimately benign on diagnostic mammogram 11/6/2020. Repeat CT chest 6/25/2020 demonstrated decreased conspicuity of the JHONY nodule. On 6/11/2022 repeat CT chest was obtained during evaluation for acute on chronic respiratory failure demonstrating an interval increase in the LLL mass suspicious for bronchogenic carcinoma as well as L hilar adenopathy concerning for disease involvement. PET/CT 6/24/22 confirmed the heterogeneous LLL mass with irregular spiculated components measuring 4.4 x 2.3cm in greatest dimensions as well as L hilar and subcarinal mediastinal activity concerning for diego metastases. Focal activity in the most medial proximal L thigh was concerning for potential additional dermal neoplasm or focal abnormal inflammatory process. Mild activity was associated with a cystic abnormality in the medial RUL most consistent with an inflammatory process. On 7/20/22 she underwent EBUS and biopsy by Dr. Saray Segovia. Final pathology demonstrated adenocarcinoma of station 11L.  She is on supplemental oxygen and endorses pain in the lower back which has been intermittent. PAST MEDICAL HISTORY:    Past Medical History:   Diagnosis Date    Acute respiratory failure with hypoxia ([address] 08/22/2013    Asthma     CAD (coronary artery disease)     Followed by [facility]. Chest pain 05/31/2019    pt denies any reent CP or increase SOB    Closed right hip fracture ([address] 02/04/2022    COPD with acute lower respiratory infection ([address] 02/10/2022    severe COPD with centrilobular emphysema, on nocturnal O2. EKG, abnormal 02/15/2022    Femur fracture, right ([address] 02/11/2022    Former cigarette smoker     H/O heart artery stent     several stents- last stents placed 2/2022    History of MI (myocardial infarction)     Lung mass     Multiple closed anterior-posterior compression fractures of pelvis ([address] 02/02/2022    Other fracture of right femur, initial encounter for closed fracture ([address] 02/04/2022    Oxygen dependent     2L NC continuous    Post-traumatic subdural hematoma ([address] 03/15/2022    SDH (subdural hematoma) ([address] 03/18/2022    hx of    Thyroid disease        The patient denies history of collagen vascular diseases, pacemaker insertion, or prior chemotherapy. See HPI for details of prior radiation.      PAST SURGICAL HISTORY:   Past Surgical History:   Procedure Laterality Date    BRONCHOSCOPY N/A 7/20/2022    BRONCHOSCOPY ENDOBRONCHIAL ULTRASOUND performed by Santino Li MD at [facility]    CAROTID ENDARTERECTOMY Right     CATARACT REMOVAL Bilateral     HIP SURGERY Right     WISDOM TOOTH EXTRACTION      as a teenagers       MEDICATIONS:     Current Outpatient Medications:     ondansetron (ZOFRAN ODT) 4 MG disintegrating tablet, Take 1 tablet by mouth every 8 hours as needed for Nausea or Vomiting, Disp: 90 tablet, Rfl: 1    prochlorperazine (COMPAZINE) 10 MG tablet, Take 1 tablet by mouth every 6 hours as needed (chemo-induced nausea), Disp: 90 tablet, Rfl: 2    lidocaine-prilocaine (EMLA) 2.5-2.5 % cream, Apply topically to port 30 min prior to needle stick, Disp: 5 g, Rfl: 2    traMADol (ULTRAM) 50 MG tablet, Take 50 mg by mouth every evening., Disp: , Rfl:     predniSONE (DELTASONE) 10 MG tablet, Take 10 mg by mouth daily (with breakfast), Disp: , Rfl:     Pseudoephedrine-guaiFENesin (MUCINEX D PO), Take by mouth in the morning and at bedtime, Disp: , Rfl:     tiotropium (SPIRIVA RESPIMAT) 2.5 MCG/ACT AERS inhaler, Inhale 2 puffs into the lungs daily, Disp: 1 each, Rfl: 11    LORazepam (ATIVAN) 0.5 MG tablet, Take 1 tablet by mouth 3 times daily as needed for Anxiety for up to 30 days.  (Patient not taking: Reported on 7/26/2022), Disp: 12 tablet, Rfl: 0    torsemide (DEMADEX) 10 MG tablet, Take 10 mg by mouth daily, Disp: , Rfl:     acetaminophen (TYLENOL) 325 MG tablet, Take by mouth every 4 hours as needed, Disp: , Rfl:     aspirin 81 MG chewable tablet, Take 81 mg by mouth daily, Disp: , Rfl:     atorvastatin (LIPITOR) 80 MG tablet, Take 80 mg by mouth at bedtime TAKE 1 TABLET BY MOUTH DAILY, Disp: , Rfl:     budesonide-formoterol (SYMBICORT) 160-4.5 MCG/ACT AERO, Inhale 2 puffs into the lungs 2 times daily, Disp: , Rfl:     butalbital-acetaminophen-caffeine (FIORICET, ESGIC) -40 MG per tablet, Take 1 tablet by mouth every 6 hours as needed (Patient not taking: Reported on 7/26/2022), Disp: , Rfl:     cetirizine (ZYRTEC) 10 MG tablet, Take 10 mg by mouth daily as needed, Disp: , Rfl:     clopidogrel (PLAVIX) 75 MG tablet, Take 75 mg by mouth daily, Disp: , Rfl:     escitalopram (LEXAPRO) 10 MG tablet, Take 10 mg by mouth daily, Disp: , Rfl:     ezetimibe (ZETIA) 10 MG tablet, Take 10 mg by mouth at bedtime, Disp: , Rfl:     methIMAzole (TAPAZOLE) 10 MG tablet, Take 10 mg by mouth daily, Disp: , Rfl:     metoprolol succinate (TOPROL XL) 100 MG extended release tablet, Take 100 mg by mouth daily, Disp: , Rfl:     nitroGLYCERIN (NITROSTAT) 0.4 MG SL tablet, Place 0.4 mg under the tongue 3 times daily as needed, Disp: , Rfl:     pantoprazole (PROTONIX) 40 MG tablet, Take 40 mg by mouth 2 times daily, Disp: , Rfl:     Roflumilast (DALIRESP) 500 MCG tablet, Take 500 mcg by mouth nightly, Disp: , Rfl:     ALLERGIES:   Allergies   Allergen Reactions    Promethazine Nausea And Vomiting and Other (See Comments)     Severe vomiting        SOCIAL HISTORY:   Social History     Socioeconomic History    Marital status:      Spouse name: Not on file    Number of children: Not on file    Years of education: Not on file    Highest education level: Not on file   Occupational History    Not on file   Tobacco Use    Smoking status: Former     Packs/day: 0.50     Years: 50.00     Pack years: 25.00     Types: Cigarettes     Quit date:      Years since quittin.5    Smokeless tobacco: Never   Substance and Sexual Activity    Alcohol use: Not Currently    Drug use: Not on file    Sexual activity: Not on file   Other Topics Concern    Not on file   Social History Narrative    Not on file     Social Determinants of Health     Financial Resource Strain: Not on file   Food Insecurity: Not on file   Transportation Needs: Not on file   Physical Activity: Not on file   Stress: Not on file   Social Connections: Not on file   Intimate Partner Violence: Not on file   Housing Stability: Not on file       FAMILY HISTORY:   Family History   Adopted: Yes   Problem Relation Age of Onset    No Known Problems Mother     No Known Problems Father        REVIEW OF SYSTEMS:   A full 12-point review of systems was completed and was negative unless noted in the history of present illness. PHYSICAL EXAMINATION:   ECOG Performance status 1  VITAL SIGNS: There were no vitals filed for this visit.      General: well developed/nourished adult Female in no acute distress; appears stated age; using oxygen  HEENT: normocephalic, atraumatic; EOMI  Neck: supple with full ROM  Respiratory: normal inspiratory effort, no audible wheezes  Extremities: no cyanosis, clubbing, or edema  Musculoskeletal: mobility intact x4; normal ROM in all joints  Neuro: AOx3; sensation intact x 4; CNII-XII grossly intact  Psych: appropriate affect, insight, and judgement    PATHOLOGY:    I personally reviewed the pathology reports as documented in the HPI. LABORATORY:   Lab Results   Component Value Date/Time     07/26/2022 01:49 PM    K 3.7 07/26/2022 01:49 PM     07/26/2022 01:49 PM    CO2 27 07/26/2022 01:49 PM    BUN 14 07/26/2022 01:49 PM    GFRAA >60 07/26/2022 01:49 PM    MG 2.6 03/16/2022 12:15 PM    GLOB 4.1 07/26/2022 01:49 PM    ALT 18 07/26/2022 01:49 PM     Lab Results   Component Value Date/Time    WBC 7.5 07/26/2022 01:49 PM    HGB 10.2 07/26/2022 01:49 PM    HCT 32.8 07/26/2022 01:49 PM     07/26/2022 01:49 PM       RADIOLOGY:    I personally reviewed the images and agree with the findings as documented in the HPI. IMPRESSION:  Abundio Sepulveda is a 66 y.o. female with stage III NSCLC. I had a long discussion with Abundio Sepulveda regarding definitive chemoradiation for stage III NSCLC. I recommended 6 weeks of treatment and discussed possible acute and late toxicities including esophagitis, fatigue, and pneumonitis. I discussed the need to confirm limited stage disease on her MRI and changes which may occur in her treatment plan if metastatic disease is found, although this is unlikely. Abundio Sepulveda and her family had the opportunity to ask questions which appeared to be answered to their satisfaction and have elected to proceed. PLAN:    Consented patient for treatment with external beam radiation after discussing risk, benefits, and side effects from treatment. CT simulation to be scheduled and treatment to be coordinated with concurrent chemotherapy.     Miladys Vance MD   July 29, 2022

## 2022-07-29 ENCOUNTER — TELEPHONE (OUTPATIENT)
Dept: ONCOLOGY | Age: 72
End: 2022-07-29

## 2022-07-29 ENCOUNTER — HOSPITAL ENCOUNTER (OUTPATIENT)
Dept: RADIATION ONCOLOGY | Age: 72
Setting detail: RECURRING SERIES
Discharge: HOME | End: 2022-08-01
Payer: MEDICARE

## 2022-07-29 VITALS — TEMPERATURE: 97.6 F | HEART RATE: 74 BPM | DIASTOLIC BLOOD PRESSURE: 66 MMHG | SYSTOLIC BLOOD PRESSURE: 126 MMHG

## 2022-07-29 PROCEDURE — 99211 OFF/OP EST MAY X REQ PHY/QHP: CPT

## 2022-07-29 ASSESSMENT — PAIN SCALES - GENERAL: PAINLEVEL_OUTOF10: 3

## 2022-07-29 ASSESSMENT — PAIN DESCRIPTION - LOCATION: LOCATION: BACK

## 2022-07-29 ASSESSMENT — PAIN DESCRIPTION - ORIENTATION: ORIENTATION: LOWER;LEFT

## 2022-07-29 NOTE — TELEPHONE ENCOUNTER
Pt called requesting to speak to [facility] Nurse Lela Stated its in regard to her treatment plan. Normal rate, regular rhythm.  Heart sounds S1, S2.  No murmurs, rubs or gallops.

## 2022-07-29 NOTE — PROGRESS NOTES
Pt is here today for the initial RT consult with Dr. Jana Marinelli for LLL lung NSCLC demonstrated by the 7/20/22 EBUS and a 6/24/22 PET scan. Pt is LN positive. She is followed by Dr. Shlomo Gordon in Pulmonary, and Dr. Shakila Flores in [address] [place] [address]. The plan is for pt to receive concurrent chemo/RT for 6 weeks. Pt is c/o mild back pain, but denies chest pain or hemoptysis. An MRI Brain is scheduled for 8/2/22. She is on 2 liters of portable O2. An overview of RT was given. RT consents were signed. Pt is aware of the CT/Sim appt on 8/22/22.

## 2022-07-29 NOTE — TELEPHONE ENCOUNTER
7/29/2022 - Spoke with pt re: tx and her schedule. Gave pt instructions prior to port placement. Pt requesting to delay treatment until 9/7, instead of 8/15 for c1. Will speak with Dr. Edward Young on Monday, 8/4, about this request and will inform pt via phone. Advised pt to hold Plavix 5 days prior to port placement, per [address] IR sheet. Encouraged to call with questions. Navigation will continue to follow.

## 2022-08-02 ENCOUNTER — TELEPHONE (OUTPATIENT)
Dept: PULMONOLOGY | Age: 72
End: 2022-08-02

## 2022-08-02 ENCOUNTER — TELEPHONE (OUTPATIENT)
Dept: ONCOLOGY | Age: 72
End: 2022-08-02

## 2022-08-02 ENCOUNTER — HOSPITAL ENCOUNTER (OUTPATIENT)
Dept: MRI IMAGING | Age: 72
Discharge: HOME | End: 2022-08-05
Payer: MEDICARE

## 2022-08-02 DIAGNOSIS — C34.90 NON-SMALL CELL LUNG CANCER, UNSPECIFIED LATERALITY (HCC): ICD-10-CM

## 2022-08-02 DIAGNOSIS — C34.90 MALIGNANT NEOPLASM OF LUNG, UNSPECIFIED LATERALITY, UNSPECIFIED PART OF LUNG (HCC): ICD-10-CM

## 2022-08-02 DIAGNOSIS — C34.90 NON-SMALL CELL LUNG CANCER, UNSPECIFIED LATERALITY (HCC): Primary | ICD-10-CM

## 2022-08-02 PROCEDURE — A9579 GAD-BASE MR CONTRAST NOS,1ML: HCPCS | Performed by: INTERNAL MEDICINE

## 2022-08-02 PROCEDURE — 6360000004 HC RX CONTRAST MEDICATION: Performed by: INTERNAL MEDICINE

## 2022-08-02 PROCEDURE — 2580000003 HC RX 258: Performed by: INTERNAL MEDICINE

## 2022-08-02 PROCEDURE — 70553 MRI BRAIN STEM W/O & W/DYE: CPT

## 2022-08-02 RX ORDER — LIDOCAINE AND PRILOCAINE 25; 25 MG/G; MG/G
CREAM TOPICAL
Qty: 30 G | Refills: 1 | Status: SHIPPED | OUTPATIENT
Start: 2022-08-02

## 2022-08-02 RX ORDER — SODIUM CHLORIDE 0.9 % (FLUSH) 0.9 %
10 SYRINGE (ML) INJECTION AS NEEDED
Status: DISCONTINUED | OUTPATIENT
Start: 2022-08-02 | End: 2022-08-06 | Stop reason: HOSPADM

## 2022-08-02 RX ADMIN — GADOTERIDOL 10 ML: 279.3 INJECTION, SOLUTION INTRAVENOUS at 08:41

## 2022-08-02 RX ADMIN — SODIUM CHLORIDE, PRESERVATIVE FREE 10 ML: 5 INJECTION INTRAVENOUS at 08:41

## 2022-08-02 NOTE — PROGRESS NOTES
Received message from nurse navigator stating: \"Can we get assistance for this pt's EMLA cream. she is saying it costs $40 for a tube\"    I will initiated a PA to see if that changes anything and now awaiting determination.

## 2022-08-02 NOTE — TELEPHONE ENCOUNTER
Patient wants to know if with doing a vv is it possible to allow a third party to merge into the call .  She is to have a vv soon and wanted to know so she could make this happen

## 2022-08-02 NOTE — TELEPHONE ENCOUNTER
8/2/2022 - Notified pt via phone instructions on using GoodRx card and not to use her insurance and purchase the EMLA cream at her pharmacy choice. Pt aware. Encouraged to call with questions. Navigation will continue to follow.

## 2022-08-02 NOTE — PROGRESS NOTES
Per CMM: \" Information regarding your request  This medication or product is on your plan's list of covered drugs. Prior authorization is not required at this time. If your pharmacy has questions regarding the processing of your prescription, please have them call the [employer] pharmacy help desk at [phone]. **Please note: This request was submitted electronically. Formulary lowering, tiering exception, cost reduction and/or pre-benefit determination review (including prospective Medicare hospice reviews) requests cannot be requested using this method of submission. Providers contact us at [phone] for further assistance. \"

## 2022-08-03 ENCOUNTER — CLINICAL DOCUMENTATION (OUTPATIENT)
Dept: ONCOLOGY | Age: 72
End: 2022-08-03

## 2022-08-03 NOTE — PROGRESS NOTES
8/3/2022 - Notified pt of updated schedule. Pt verbalized understanding and will check My Chart. Encouraged to call with questions. Navigation will continue to follow.

## 2022-08-03 NOTE — TELEPHONE ENCOUNTER
Confirmed with Mrs Wagoner Garrisonadama that she approves third party on Doxy appointment on 8/8. Tested system to assure would allow.   Patient is aware and second # added to appointment note

## 2022-08-04 ENCOUNTER — ANESTHESIA EVENT (OUTPATIENT)
Dept: INTERVENTIONAL RADIOLOGY/VASCULAR | Age: 72
End: 2022-08-04

## 2022-08-04 ENCOUNTER — APPOINTMENT (OUTPATIENT)
Dept: RADIATION ONCOLOGY | Age: 72
End: 2022-08-04
Payer: MEDICARE

## 2022-08-05 ENCOUNTER — HOSPITAL ENCOUNTER (OUTPATIENT)
Dept: INTERVENTIONAL RADIOLOGY/VASCULAR | Age: 72
Discharge: HOME | End: 2022-08-08
Payer: MEDICARE

## 2022-08-05 ENCOUNTER — ANESTHESIA (OUTPATIENT)
Dept: INTERVENTIONAL RADIOLOGY/VASCULAR | Age: 72
End: 2022-08-05

## 2022-08-05 VITALS
HEIGHT: 67 IN | SYSTOLIC BLOOD PRESSURE: 127 MMHG | RESPIRATION RATE: 16 BRPM | TEMPERATURE: 97.6 F | OXYGEN SATURATION: 97 % | WEIGHT: 128 LBS | DIASTOLIC BLOOD PRESSURE: 59 MMHG | BODY MASS INDEX: 20.09 KG/M2 | HEART RATE: 69 BPM

## 2022-08-05 DIAGNOSIS — C34.90 NON-SMALL CELL LUNG CANCER, UNSPECIFIED LATERALITY (HCC): ICD-10-CM

## 2022-08-05 PROCEDURE — 76937 US GUIDE VASCULAR ACCESS: CPT

## 2022-08-05 PROCEDURE — 3700000000 HC ANESTHESIA ATTENDED CARE

## 2022-08-05 PROCEDURE — 3700000001 HC ADD 15 MINUTES (ANESTHESIA)

## 2022-08-05 PROCEDURE — 2500000003 HC RX 250 WO HCPCS: Performed by: PHYSICIAN ASSISTANT

## 2022-08-05 PROCEDURE — 6360000002 HC RX W HCPCS: Performed by: PHYSICIAN ASSISTANT

## 2022-08-05 RX ORDER — LIDOCAINE HYDROCHLORIDE 20 MG/ML
INJECTION, SOLUTION EPIDURAL; INFILTRATION; INTRACAUDAL; PERINEURAL PRN
Status: DISCONTINUED | OUTPATIENT
Start: 2022-08-05 | End: 2022-08-05 | Stop reason: SDUPTHER

## 2022-08-05 RX ORDER — ONDANSETRON 2 MG/ML
INJECTION INTRAMUSCULAR; INTRAVENOUS PRN
Status: DISCONTINUED | OUTPATIENT
Start: 2022-08-05 | End: 2022-08-05 | Stop reason: SDUPTHER

## 2022-08-05 RX ORDER — HEPARIN SODIUM 5000 [USP'U]/ML
INJECTION, SOLUTION INTRAVENOUS; SUBCUTANEOUS
Status: COMPLETED | OUTPATIENT
Start: 2022-08-05 | End: 2022-08-05

## 2022-08-05 RX ORDER — SODIUM CHLORIDE, SODIUM LACTATE, POTASSIUM CHLORIDE, CALCIUM CHLORIDE 600; 310; 30; 20 MG/100ML; MG/100ML; MG/100ML; MG/100ML
INJECTION, SOLUTION INTRAVENOUS CONTINUOUS PRN
Status: DISCONTINUED | OUTPATIENT
Start: 2022-08-05 | End: 2022-08-05 | Stop reason: SDUPTHER

## 2022-08-05 RX ORDER — EPHEDRINE SULFATE/0.9% NACL/PF 50 MG/5 ML
SYRINGE (ML) INTRAVENOUS PRN
Status: DISCONTINUED | OUTPATIENT
Start: 2022-08-05 | End: 2022-08-05 | Stop reason: SDUPTHER

## 2022-08-05 RX ORDER — PROPOFOL 10 MG/ML
INJECTION, EMULSION INTRAVENOUS CONTINUOUS PRN
Status: DISCONTINUED | OUTPATIENT
Start: 2022-08-05 | End: 2022-08-05 | Stop reason: SDUPTHER

## 2022-08-05 RX ORDER — LIDOCAINE HYDROCHLORIDE AND EPINEPHRINE BITARTRATE 20; .01 MG/ML; MG/ML
INJECTION, SOLUTION SUBCUTANEOUS
Status: COMPLETED | OUTPATIENT
Start: 2022-08-05 | End: 2022-08-05

## 2022-08-05 RX ORDER — LIDOCAINE HYDROCHLORIDE 20 MG/ML
INJECTION, SOLUTION INFILTRATION; PERINEURAL
Status: COMPLETED | OUTPATIENT
Start: 2022-08-05 | End: 2022-08-05

## 2022-08-05 RX ADMIN — SODIUM CHLORIDE, SODIUM LACTATE, POTASSIUM CHLORIDE, CALCIUM CHLORIDE: 600; 310; 30; 20 INJECTION, SOLUTION INTRAVENOUS at 11:12

## 2022-08-05 RX ADMIN — LIDOCAINE HYDROCHLORIDE 60 MG: 20 INJECTION, SOLUTION INFILTRATION; PERINEURAL at 11:30

## 2022-08-05 RX ADMIN — LIDOCAINE HYDROCHLORIDE,EPINEPHRINE BITARTRATE 100 MG: 20; .01 INJECTION, SOLUTION INFILTRATION; PERINEURAL at 11:29

## 2022-08-05 RX ADMIN — Medication 10 MG: at 11:27

## 2022-08-05 RX ADMIN — HEPARIN SODIUM 5000 UNITS: 5000 INJECTION INTRAVENOUS; SUBCUTANEOUS at 11:38

## 2022-08-05 RX ADMIN — LIDOCAINE HYDROCHLORIDE 50 MG: 20 INJECTION, SOLUTION EPIDURAL; INFILTRATION; INTRACAUDAL; PERINEURAL at 11:16

## 2022-08-05 RX ADMIN — ONDANSETRON 4 MG: 2 INJECTION INTRAMUSCULAR; INTRAVENOUS at 11:22

## 2022-08-05 RX ADMIN — PROPOFOL 300 MCG/KG/MIN: 10 INJECTION, EMULSION INTRAVENOUS at 11:16

## 2022-08-05 ASSESSMENT — COPD QUESTIONNAIRES: CAT_SEVERITY: SEVERE

## 2022-08-05 NOTE — ANESTHESIA PRE PROCEDURE
Department of Anesthesiology  Preprocedure Note       Name:  Latoya Carr   Age:  66 y.o.  :  1950                                          MRN:  [id number]         Date:  2022      Surgeon: * No surgeons listed *    Procedure: * No procedures listed *    Medications prior to admission:   Prior to Admission medications    Medication Sig Start Date End Date Taking? Authorizing Provider   lidocaine-prilocaine (EMLA) 2.5-2.5 % cream Apply topically as needed. 22   Gustavo Londono MD   ondansetron (ZOFRAN ODT) 4 MG disintegrating tablet Take 1 tablet by mouth every 8 hours as needed for Nausea or Vomiting 22   Gustavo Londono MD   prochlorperazine (COMPAZINE) 10 MG tablet Take 1 tablet by mouth every 6 hours as needed (chemo-induced nausea) 22   Gustavo Londono MD   traMADol (ULTRAM) 50 MG tablet Take 50 mg by mouth every evening.     Historical Provider, MD   predniSONE (DELTASONE) 10 MG tablet Take 10 mg by mouth daily (with breakfast)    Historical Provider, MD   Pseudoephedrine-guaiFENesin ([address] D PO) Take by mouth in the morning and at bedtime    Historical Provider, MD   tiotropium (SPIRIVA RESPIMAT) 2.5 MCG/ACT AERS inhaler Inhale 2 puffs into the lungs daily 22   Pleasant Boxer, MD   torsemide (DEMADEX) 10 MG tablet Take 10 mg by mouth daily    Historical Provider, MD   acetaminophen (TYLENOL) 325 MG tablet Take by mouth every 4 hours as needed    Ar Automatic Reconciliation   aspirin 81 MG chewable tablet Take 81 mg by mouth daily 3/31/22   Ar Automatic Reconciliation   atorvastatin (LIPITOR) 80 MG tablet Take 80 mg by mouth at bedtime TAKE 1 TABLET BY MOUTH DAILY 21   Ar Automatic Reconciliation   budesonide-formoterol (SYMBICORT) 160-4.5 MCG/ACT AERO Inhale 2 puffs into the lungs 2 times daily 10/7/21   Ar Automatic Reconciliation   butalbital-acetaminophen-caffeine (FIORICET, ESGIC) -40 MG per tablet Take 1 tablet by mouth every 6 hours as needed  Patient not taking: Reported on 7/26/2022 3/30/22   Ar Automatic Reconciliation   cetirizine (ZYRTEC) 10 MG tablet Take 10 mg by mouth daily as needed 3/30/22   Ar Automatic Reconciliation   clopidogrel (PLAVIX) 75 MG tablet Take 75 mg by mouth daily 3/31/22   Ar Automatic Reconciliation   escitalopram (LEXAPRO) 10 MG tablet Take 10 mg by mouth daily 4/14/22   Ar Automatic Reconciliation   ezetimibe (ZETIA) 10 MG tablet Take 10 mg by mouth at bedtime 10/7/21   Ar Automatic Reconciliation   methIMAzole (TAPAZOLE) 10 MG tablet Take 10 mg by mouth daily 3/30/22   Ar Automatic Reconciliation   metoprolol succinate (TOPROL XL) 100 MG extended release tablet Take 100 mg by mouth daily 3/30/22   Ar Automatic Reconciliation   nitroGLYCERIN (NITROSTAT) 0.4 MG SL tablet Place 0.4 mg under the tongue 3 times daily as needed 2/4/22   Ar Automatic Reconciliation   pantoprazole (PROTONIX) 40 MG tablet Take 40 mg by mouth 2 times daily 3/30/22   Ar Automatic Reconciliation   Roflumilast (DALIRESP) 500 MCG tablet Take 500 mcg by mouth nightly 3/30/22   Ar Automatic Reconciliation       Current medications:    Current Outpatient Medications   Medication Sig Dispense Refill    lidocaine-prilocaine (EMLA) 2.5-2.5 % cream Apply topically as needed. 30 g 1    ondansetron (ZOFRAN ODT) 4 MG disintegrating tablet Take 1 tablet by mouth every 8 hours as needed for Nausea or Vomiting 90 tablet 1    prochlorperazine (COMPAZINE) 10 MG tablet Take 1 tablet by mouth every 6 hours as needed (chemo-induced nausea) 90 tablet 2    traMADol (ULTRAM) 50 MG tablet Take 50 mg by mouth every evening.       predniSONE (DELTASONE) 10 MG tablet Take 10 mg by mouth daily (with breakfast)      Pseudoephedrine-guaiFENesin (MUCINEX D PO) Take by mouth in the morning and at bedtime      tiotropium (SPIRIVA RESPIMAT) 2.5 MCG/ACT AERS inhaler Inhale 2 puffs into the lungs daily 1 each 11    torsemide (DEMADEX) 10 MG tablet Take 10 mg by mouth daily  acetaminophen (TYLENOL) 325 MG tablet Take by mouth every 4 hours as needed      aspirin 81 MG chewable tablet Take 81 mg by mouth daily      atorvastatin (LIPITOR) 80 MG tablet Take 80 mg by mouth at bedtime TAKE 1 TABLET BY MOUTH DAILY      budesonide-formoterol (SYMBICORT) 160-4.5 MCG/ACT AERO Inhale 2 puffs into the lungs 2 times daily      butalbital-acetaminophen-caffeine (FIORICET, ESGIC) -40 MG per tablet Take 1 tablet by mouth every 6 hours as needed (Patient not taking: Reported on 7/26/2022)      cetirizine (ZYRTEC) 10 MG tablet Take 10 mg by mouth daily as needed      clopidogrel (PLAVIX) 75 MG tablet Take 75 mg by mouth daily      escitalopram (LEXAPRO) 10 MG tablet Take 10 mg by mouth daily      ezetimibe (ZETIA) 10 MG tablet Take 10 mg by mouth at bedtime      methIMAzole (TAPAZOLE) 10 MG tablet Take 10 mg by mouth daily      metoprolol succinate (TOPROL XL) 100 MG extended release tablet Take 100 mg by mouth daily      nitroGLYCERIN (NITROSTAT) 0.4 MG SL tablet Place 0.4 mg under the tongue 3 times daily as needed      pantoprazole (PROTONIX) 40 MG tablet Take 40 mg by mouth 2 times daily      Roflumilast (DALIRESP) 500 MCG tablet Take 500 mcg by mouth nightly       No current facility-administered medications for this visit. Facility-Administered Medications Ordered in Other Visits   Medication Dose Route Frequency Provider Last Rate Last Admin    sodium chloride flush 0.9 % injection 10 mL  10 mL IntraVENous PRN Adam Long MD   10 mL at 08/02/22 0841       Allergies:     Allergies   Allergen Reactions    Promethazine Nausea And Vomiting and Other (See Comments)     Severe vomiting        Problem List:    Patient Active Problem List   Diagnosis Code    Essential hypertension, benign I10    Ventricular ectopy I49.3    Iron deficiency anemia due to chronic blood loss D50.0    Carotid stenosis, right I65.21    Carotid stenosis I65.29    Right ear pain H92.01    01:49 PM    BILITOT 0.4 07/26/2022 01:49 PM    ALKPHOS 62 07/26/2022 01:49 PM    ALKPHOS 81 03/21/2022 02:05 AM    AST 14 07/26/2022 01:49 PM    ALT 18 07/26/2022 01:49 PM       POC Tests: No results for input(s): POCGLU, POCNA, POCK, POCCL, POCBUN, POCHEMO, POCHCT in the last 72 hours. Coags:   Lab Results   Component Value Date/Time    PROTIME 13.0 03/15/2022 03:02 PM    INR 1.0 03/15/2022 03:02 PM    APTT 27.8 03/15/2022 03:02 PM       HCG (If Applicable): No results found for: PREGTESTUR, PREGSERUM, HCG, HCGQUANT     ABGs:   Lab Results   Component Value Date/Time    PHART 7.32 02/06/2022 06:54 AM    PO2ART 95 02/06/2022 06:54 AM    NPN7HNM 46.3 02/06/2022 06:54 AM    OCD4YBJ 23.8 02/06/2022 06:54 AM        Type & Screen (If Applicable):  No results found for: LABABO, LABRH    Drug/Infectious Status (If Applicable):  No results found for: HIV, HEPCAB    COVID-19 Screening (If Applicable):   Lab Results   Component Value Date/Time    COVID19 NOT DETECTED 06/09/2022 08:14 AM           Anesthesia Evaluation  Patient summary reviewed  Airway: Mallampati: II  TM distance: <3 FB   Neck ROM: full  Mouth opening: > = 3 FB   Dental: normal exam         Pulmonary:Negative Pulmonary ROS breath sounds clear to auscultation  (+) COPD (Home O2 2L NC): severe,                             Cardiovascular:  Exercise tolerance: good (>4 METS),   (+) hypertension:, valvular problems/murmurs (moderate to severe 2/2022): MR, CAD:, CABG/stent (last stent 2/2022 - Plavix held x 5d, remains on bASA):,     (-) past MI, murmur and peripheral edema      Rhythm: regular  Rate: normal                 ROS comment: Patient denies Chest pain or tightness, SOB at rest, and orthopnea. Neuro/Psych:   Negative Neuro/Psych ROS  (+) psychiatric history:   (-) CVA           GI/Hepatic/Renal: Neg GI/Hepatic/Renal ROS            Endo/Other: Negative Endo/Other ROS                    Abdominal:             Vascular: negative vascular ROS.

## 2022-08-05 NOTE — OP NOTE
Department of Interventional Radiology  [phone]        Interventional Radiology Brief Procedure Note    Patient: Aurora Leo MRN: [id number]  SSN: [id number]    YOB: 1950  Age: 66 y.o.   Sex: female      Date of Procedure: 8/5/2022    Pre-Procedure Diagnosis: lung cancer    Post-Procedure Diagnosis: SAME    Procedure(s): Venous Chest Port Placement    Brief Description of Procedure: as above    Performed By: Tomás Lopez PA-C     Assistants: None    Anesthesia:TIVS/MAC    Estimated Blood Loss: Less than 10ml    Specimens:  None    Implants:  Subcutaneous Port    Findings: catheter tip in right atrium     Complications: None    Recommendations: ok to use port     Follow Up: prn    Signed By: Tomás Lopez PA-C     August 5, 2022

## 2022-08-05 NOTE — OR NURSING
Prep complete. Patient ready for procedure. Patient and male  educated on power port using [employer] supplied materials. Opportunity for questions provided. Both verbalized understanding.

## 2022-08-05 NOTE — DISCHARGE INSTRUCTIONS
[facility]     Department of Interventional Radiology     Phone Number: [phone]     Fax: [phone]         If you have any questions about your procedure, please call the Interventional Radiology department at [phone]. After business hours (5pm) and weekends, call the answering service at [phone] and ask for the Radiologist on call to be paged. Si tiene Preguntas acerca del procedimiento, por favor llame al departamento de Radiología Intervencional al [phone]. Después de horas de oficina (5 pm) y los fines de [place], llamar al [employer] al [phone] y pregunte por el Radiologo de [facility].

## 2022-08-05 NOTE — ANESTHESIA POSTPROCEDURE EVALUATION
Department of Anesthesiology  Postprocedure Note    Patient: Ivette Rubi  MRN: [id number]  YOB: 1950  Date of evaluation: 8/5/2022      Procedure Summary     Date: 08/05/22 Room / Location: [facility]    Anesthesia Start: 9082 Anesthesia Stop: 9430    Procedure: IR PORT PLACEMENT EQUAL OR GREATER THAN 5 YEARS Diagnosis: Non-small cell lung cancer, unspecified laterality ([facility] [address]    Scheduled Providers: YISEL Schmitt - KATHY; Homero Parrish MD Responsible Provider:     Anesthesia Type: TIVA ASA Status: 4          Anesthesia Type: No value filed.     Chela Phase I:      Chela Phase II:        Anesthesia Post Evaluation    Patient location during evaluation: PACU  Patient participation: complete - patient participated  Level of consciousness: awake and alert  Airway patency: patent  Nausea & Vomiting: no nausea and no vomiting  Complications: no  Cardiovascular status: hemodynamically stable  Respiratory status: acceptable, nonlabored ventilation and spontaneous ventilation  Hydration status: euvolemic  Comments: BP (!) 129/58   Pulse 74   Temp 97.6 °F (36.4 °C) (Oral)   Resp 16   Ht 5' 7\" (1.702 m)   Wt 128 lb (58.1 kg)   SpO2 100%   BMI 20.05 kg/m²     Multimodal analgesia pain management approach

## 2022-08-05 NOTE — H&P
Department of Interventional Radiology  [phone]    History and Physical    Patient:  April Reyez MRN:  [id number]  SSN:  [id number]    YOB: 1950  Age:  66 y.o. Sex:  female      Primary Care Provider:  Judi Biggs MD  Referring Physician:  Jose Luis Sifuentes MD    Subjective:     Chief Complaint: port    History of the Present Illness: The patient is a 66 y.o. female with lung cancer who presents for venous chest port placement. CAD, Plavix held. NPO. Past Medical History:   Diagnosis Date    Acute respiratory failure with hypoxia ([address] 08/22/2013    Asthma     CAD (coronary artery disease)     Followed by [facility]. Chest pain 05/31/2019    pt denies any reent CP or increase SOB    Closed right hip fracture ([address] 02/04/2022    COPD with acute lower respiratory infection ([address] 02/10/2022    severe COPD with centrilobular emphysema, on nocturnal O2. EKG, abnormal 02/15/2022    Femur fracture, right ([address] 02/11/2022    Former cigarette smoker     H/O heart artery stent     several stents- last stents placed 2/2022    History of MI (myocardial infarction)     Lung mass     Multiple closed anterior-posterior compression fractures of pelvis ([address] 02/02/2022    Other fracture of right femur, initial encounter for closed fracture ([address] 02/04/2022    Oxygen dependent     2L NC continuous    Post-traumatic subdural hematoma ([address] 03/15/2022    SDH (subdural hematoma) ([address] 03/18/2022    hx of    Thyroid disease      Past Surgical History:   Procedure Laterality Date    BRONCHOSCOPY N/A 7/20/2022    BRONCHOSCOPY ENDOBRONCHIAL ULTRASOUND performed by Travis Escobar MD at [facility] ENDOSCOPY    CAROTID ENDARTERECTOMY Right     CATARACT REMOVAL Bilateral     HIP SURGERY Right     WISDOM TOOTH EXTRACTION      as a teenagers        Review of Systems:    Pertinent items are noted in HPI.     Prior to Admission medications    Medication Sig Start Date End Date Taking? Authorizing Provider   lidocaine-prilocaine (EMLA) 2.5-2.5 % cream Apply topically as needed. 8/2/22   Charlene Simeon MD   ondansetron (ZOFRAN ODT) 4 MG disintegrating tablet Take 1 tablet by mouth every 8 hours as needed for Nausea or Vomiting 7/26/22   Charlene Simeon MD   prochlorperazine (COMPAZINE) 10 MG tablet Take 1 tablet by mouth every 6 hours as needed (chemo-induced nausea) 7/26/22   Charlene Simeon MD   traMADol (ULTRAM) 50 MG tablet Take 50 mg by mouth every evening.     Historical Provider, MD   predniSONE (DELTASONE) 10 MG tablet Take 10 mg by mouth daily (with breakfast)    Historical Provider, MD   Pseudoephedrine-guaiFENesin ([address] D PO) Take by mouth in the morning and at bedtime    Historical Provider, MD   tiotropium (SPIRIVA RESPIMAT) 2.5 MCG/ACT AERS inhaler Inhale 2 puffs into the lungs daily 6/28/22   Derrick Sheth MD   torsemide (DEMADEX) 10 MG tablet Take 10 mg by mouth daily    Historical Provider, MD   acetaminophen (TYLENOL) 325 MG tablet Take by mouth every 4 hours as needed    Ar Automatic Reconciliation   aspirin 81 MG chewable tablet Take 81 mg by mouth daily 3/31/22   Ar Automatic Reconciliation   atorvastatin (LIPITOR) 80 MG tablet Take 80 mg by mouth at bedtime TAKE 1 TABLET BY MOUTH DAILY 9/27/21   Ar Automatic Reconciliation   budesonide-formoterol (SYMBICORT) 160-4.5 MCG/ACT AERO Inhale 2 puffs into the lungs 2 times daily 10/7/21   Ar Automatic Reconciliation   butalbital-acetaminophen-caffeine (FIORICET, ESGIC) -40 MG per tablet Take 1 tablet by mouth every 6 hours as needed  Patient not taking: Reported on 7/26/2022 3/30/22   Ar Automatic Reconciliation   cetirizine (ZYRTEC) 10 MG tablet Take 10 mg by mouth daily as needed 3/30/22   Ar Automatic Reconciliation   clopidogrel (PLAVIX) 75 MG tablet Take 75 mg by mouth daily 3/31/22   Ar Automatic Reconciliation   escitalopram (LEXAPRO) 10 MG tablet Take 10 mg by mouth daily 4/14/22   Ar Automatic Reconciliation   ezetimibe (ZETIA) 10 MG tablet Take 10 mg by mouth at bedtime 10/7/21   Ar Automatic Reconciliation   methIMAzole (TAPAZOLE) 10 MG tablet Take 10 mg by mouth daily 3/30/22   Ar Automatic Reconciliation   metoprolol succinate (TOPROL XL) 100 MG extended release tablet Take 100 mg by mouth daily 3/30/22   Ar Automatic Reconciliation   nitroGLYCERIN (NITROSTAT) 0.4 MG SL tablet Place 0.4 mg under the tongue 3 times daily as needed 22   Ar Automatic Reconciliation   pantoprazole (PROTONIX) 40 MG tablet Take 40 mg by mouth 2 times daily 3/30/22   Ar Automatic Reconciliation   Roflumilast (DALIRESP) 500 MCG tablet Take 500 mcg by mouth nightly 3/30/22   Ar Automatic Reconciliation        Allergies   Allergen Reactions    Promethazine Nausea And Vomiting and Other (See Comments)     Severe vomiting        Family History   Adopted: Yes   Problem Relation Age of Onset    No Known Problems Mother     No Known Problems Father      Social History     Tobacco Use    Smoking status: Former     Packs/day: 0.50     Years: 50.00     Pack years: 25.00     Types: Cigarettes     Quit date:      Years since quittin.5    Smokeless tobacco: Never   Substance Use Topics    Alcohol use: Not Currently        Not in a hospital admission.     Objective:       Physical Examination:    Vitals:    22 0915   BP: (!) 112/53   Pulse: 66   Resp: 16   TempSrc: Oral   SpO2: 100%   Weight: 128 lb (58.1 kg)   Height: 5' 7\" (1.702 m)       Pain Assessment     denies                                                HEART: regular rate and rhythm  LUNG: clear to auscultation bilaterally  ABDOMEN: normal findings: soft, non-tender  EXTREMITIES: warm,  no edema    Laboratory:     Lab Results   Component Value Date/Time     2022 01:49 PM     2022 03:59 AM    K 3.7 2022 01:49 PM    K 3.8 2022 03:59 AM     2022 01:49 PM     2022 03:59 AM    CO2 27 2022 01:49 PM CO2 26 06/12/2022 03:59 AM    BUN 14 07/26/2022 01:49 PM    BUN 11 06/12/2022 03:59 AM    GFRAA >60 07/26/2022 01:49 PM    GFRAA >60 06/12/2022 03:59 AM    MG 2.6 03/16/2022 12:15 PM    MG 1.8 03/15/2022 03:02 PM    GLOB 4.1 07/26/2022 01:49 PM    GLOB 3.8 06/12/2022 03:59 AM    ALT 18 07/26/2022 01:49 PM    ALT 16 06/12/2022 03:59 AM     Lab Results   Component Value Date/Time    WBC 7.5 07/26/2022 01:49 PM    WBC 7.7 06/12/2022 03:59 AM    HGB 10.2 07/26/2022 01:49 PM    HGB 10.0 06/12/2022 03:59 AM    HCT 32.8 07/26/2022 01:49 PM    HCT 32.5 06/12/2022 03:59 AM     07/26/2022 01:49 PM     06/12/2022 03:59 AM     Lab Results   Component Value Date/Time    APTT 27.8 03/15/2022 03:02 PM    APTT 28.8 02/06/2022 06:46 PM    INR 1.0 03/15/2022 03:02 PM    INR 1.1 05/29/2019 08:52 AM       Assessment:     Lung cancer    [unfilled]    Plan:     Planned Procedure:  port placement    Risks, benefits, and alternatives reviewed with patient and she agrees to proceed with the procedure.       Signed By: Yadi Ramírez PA-C     August 5, 2022

## 2022-08-08 ENCOUNTER — TELEMEDICINE (OUTPATIENT)
Dept: PULMONOLOGY | Age: 72
End: 2022-08-08
Payer: MEDICARE

## 2022-08-08 ENCOUNTER — CLINICAL DOCUMENTATION (OUTPATIENT)
Dept: ONCOLOGY | Age: 72
End: 2022-08-08

## 2022-08-08 DIAGNOSIS — J44.9 COPD, VERY SEVERE (HCC): ICD-10-CM

## 2022-08-08 DIAGNOSIS — J96.21 ACUTE ON CHRONIC RESPIRATORY FAILURE WITH HYPOXIA (HCC): Primary | ICD-10-CM

## 2022-08-08 DIAGNOSIS — Z51.5 PALLIATIVE CARE PATIENT: ICD-10-CM

## 2022-08-08 DIAGNOSIS — R91.8 PULMONARY MASS: ICD-10-CM

## 2022-08-08 PROCEDURE — G8420 CALC BMI NORM PARAMETERS: HCPCS | Performed by: NURSE PRACTITIONER

## 2022-08-08 PROCEDURE — 3023F SPIROM DOC REV: CPT | Performed by: NURSE PRACTITIONER

## 2022-08-08 PROCEDURE — 99214 OFFICE O/P EST MOD 30 MIN: CPT | Performed by: NURSE PRACTITIONER

## 2022-08-08 PROCEDURE — 1036F TOBACCO NON-USER: CPT | Performed by: NURSE PRACTITIONER

## 2022-08-08 PROCEDURE — G8400 PT W/DXA NO RESULTS DOC: HCPCS | Performed by: NURSE PRACTITIONER

## 2022-08-08 PROCEDURE — 1123F ACP DISCUSS/DSCN MKR DOCD: CPT | Performed by: NURSE PRACTITIONER

## 2022-08-08 PROCEDURE — 1090F PRES/ABSN URINE INCON ASSESS: CPT | Performed by: NURSE PRACTITIONER

## 2022-08-08 PROCEDURE — G8427 DOCREV CUR MEDS BY ELIG CLIN: HCPCS | Performed by: NURSE PRACTITIONER

## 2022-08-08 PROCEDURE — 3017F COLORECTAL CA SCREEN DOC REV: CPT | Performed by: NURSE PRACTITIONER

## 2022-08-08 RX ORDER — TRAMADOL HYDROCHLORIDE 50 MG/1
50 TABLET ORAL 3 TIMES DAILY
Qty: 90 TABLET | Refills: 2 | Status: SHIPPED | OUTPATIENT
Start: 2022-08-08 | End: 2022-10-04 | Stop reason: SDUPTHER

## 2022-08-08 ASSESSMENT — ENCOUNTER SYMPTOMS
BACK PAIN: 1
SHORTNESS OF BREATH: 1
GASTROINTESTINAL NEGATIVE: 1
APNEA: 0
CHEST TIGHTNESS: 0

## 2022-08-08 NOTE — PROGRESS NOTES
Jordi Hyde (:  1950) is a Established patient, here for evaluation of the following:    Assessment & Plan   Below is the assessment and plan developed based on review of pertinent history, physical exam, labs, studies, and medications. 1. Acute on chronic respiratory failure with hypoxia (HCC) - Continues compliance with pulmonary medications/inhalers while undergoing treatments. She is utilizing her oxygen also. Would be concerned about her current weight and functional status heading into chemo/radiation. Encourage high fat/high calorie food intake given her current status. -     traMADol (ULTRAM) 50 MG tablet; Take 1 tablet by mouth in the morning, at noon, and at bedtime for 90 days. Z51.5 Palliative Care, Disp-90 tablet, R-2Normal    2. COPD, very severe ([address] - This disease is significant and will be a factor in her oncology therapy. Will continue monitoring and supporting along the way. We will escalate her tramadol dosing to TID as she heads into the oncology therapy. This med has been very effective for her thus far for dyspnea/shortness of breath management. -     traMADol (ULTRAM) 50 MG tablet; Take 1 tablet by mouth in the morning, at noon, and at bedtime for 90 days. Z51.5 Palliative Care, Disp-90 tablet, R-2Normal    3. Pulmonary mass - Under the care of [address] [place] [address] Oncology team with scheduled radiation/chemotherapy to being 9/10. Voiced some concerns about initiating this therapy. Aware of the available palliative care Nps embedded with oncology as additional resources if needed. -     traMADol (ULTRAM) 50 MG tablet; Take 1 tablet by mouth in the morning, at noon, and at bedtime for 90 days. Z51.5 Palliative Care, Disp-90 tablet, R-2Normal    4. Palliative care patient - Providing services in conjunction with the other providers inside/outside of pulmonary team.   -     traMADol (ULTRAM) 50 MG tablet;  Take 1 tablet by mouth in the morning, at noon, and at bedtime for 90 days. Z51.5 Palliative Care, Disp-90 tablet, R-2Normal    Return in about 4 weeks (around 9/5/2022) for Virtual appointment. Patient and family is aware that NP is ideally the primary provider in this office that will write for his/her controlled substances. The patient was advised that routine checks on [place]  Aware will be completed and that other controlled substances should not be written by other providers unless we have discussed. The patient and family are aware that the goal with these medications are to keep at the lowest possible dose to help control the symptom burden without causing adverse side effects or sedation. We discussed the differences between addiction & dependence. We also reviewed the risk/benefit profile of use of controlled substances with end stage pulmonary disease. As previously note, quality of life is the patient/family goal at this point. The patient has been compliant with the instructions regarding controlled substance medications and safety. Subjective   Ms. Rod Greenfield returns for ongoing support by palliative care, accompanied by her ex-. Since our last encounter, she has been found to have lung cancer and under the ongoing care of pulmonary + oncology. She is very upset today during our call about the inability to reach our pulmonary team. She expresses some anxiously about next steps with oncology treatment, and who will help if the therapy fails or she worsens. We spoke very frankly about the use/benefit of hospice services. Her major support network, her ex-, asked important questions and directed some of the conversation. She has known very severe COPD with centrilobular emphysema with progressive weight loss/decline. She has sought evaluation for lung transport at two different facilities and turned down each time, which has been disappointing to her.  She relates her COPD to smoking for many years but she also [place] congested city so pollination can be contributing factor. She is compliant with her pulmonary therapy. Her major symptom of dyspnea is well controlled with current dosing of Tramadol. No reports of side effects of adverse issues. Review of Systems   Constitutional:  Positive for appetite change. Negative for activity change. Doesn't have much of an appetite. Respiratory:  Positive for shortness of breath. Negative for apnea and chest tightness. Baseline dyspnea, primarily with activity. Cardiovascular: Negative. Gastrointestinal: Negative. Musculoskeletal:  Positive for back pain. Tramadol effective. Neurological: Negative. All other systems reviewed and are negative. Objective   Patient-Reported Vitals  No data recorded     Physical Exam  Constitutional:       Appearance: Normal appearance. HENT:      Head: Normocephalic. Mouth/Throat:      Mouth: Mucous membranes are moist.   Pulmonary:      Effort: Pulmonary effort is normal.      Breath sounds: No wheezing. Neurological:      General: No focal deficit present. Mental Status: She is alert. Psychiatric:         Mood and Affect: Mood normal.         Behavior: Behavior normal.         Thought Content:  Thought content normal.         Judgment: Judgment normal.   [INSTRUCTIONS:  \"[x]\" Indicates a positive item  \"[]\" Indicates a negative item  -- DELETE ALL ITEMS NOT EXAMINED]    Constitutional: [x] Appears well-developed and well-nourished [x] No apparent distress      [] Abnormal -     Mental status: [x] Alert and awake  [x] Oriented to person/place/time [x] Able to follow commands    [] Abnormal -     Eyes:   EOM    [x]  Normal    [] Abnormal -   Sclera  [x]  Normal    [] Abnormal -          Discharge [x]  None visible   [] Abnormal -     HENT: [x] Normocephalic, atraumatic  [] Abnormal -   [x] Mouth/Throat: Mucous membranes are moist    External Ears [x] Normal  [] Abnormal -    Neck: [x] No visualized mass [] Abnormal - Pulmonary/Chest: [x] Respiratory effort normal   [x] No visualized signs of difficulty breathing or respiratory distress        [] Abnormal -      Musculoskeletal:   [x] Normal gait with no signs of ataxia         [x] Normal range of motion of neck        [] Abnormal -     Neurological:        [x] No Facial Asymmetry (Cranial nerve 7 motor function) (limited exam due to video visit)          [x] No gaze palsy        [] Abnormal -          Skin:        [x] No significant exanthematous lesions or discoloration noted on facial skin         [] Abnormal -            Psychiatric:       [x] Normal Affect [] Abnormal -        [x] No Hallucinations    Other pertinent observable physical exam findings:-         On this date 8/8/2022 I have spent 40 minutes reviewing previous notes, test results and face to face (virtual) with the patient discussing the diagnosis and importance of compliance with the treatment plan as well as documenting on the day of the visit. [facility], was evaluated through a synchronous (real-time) audio-video encounter. The patient (or guardian if applicable) is aware that this is a billable service, which includes applicable co-pays. This Virtual Visit was conducted with patient's (and/or legal guardian's) consent. The visit was conducted pursuant to the emergency declaration under the [facility], [address] [place] [address] authority and the [employer] General Act. Patient identification was verified, and a caregiver was present when appropriate. The patient was located at Home: [address] [place] [id number].    [facility] [address], YISEL - CNP

## 2022-08-08 NOTE — PROGRESS NOTES
IV Chemotherapy/Biotherapy Education:  Juanita Gamez  is a 66y.o. year old female with lung cancer who presents for chemotherapy education for the following medication(s): Carboplatin/Paclitaxel. Schedule and frequency of chemotherapy were discussed with the patient and weekly. The patient was given handouts published by the [facility] [place] titled \"Chemotherapy and You\" and \"Eating Hints Before, During, and After Cancer Treatment\" for reference. Medication specific information was printed from [employer] and distributed to the patient. Self-care guidelines were distributed and discussed with the patient and included the followin) Potential long term and short term side effects of therapy including fertility risks for appropriate patients    2) Symptoms and side effects that require the patient to contact [address] [place] [address] or require immediate attention    3) Symptoms or events that require immediate discontinuation of oral or self-administered treatments    4) Procedures for handling medications at home, including storage, safe handling, and management of unused medications    5) Procedures for handling bodily fluids and waste in the home    6) The [address] [place] [address]'s contact information with availability and instructions on who and when to call    7) The [address] [place] [address] appointment policy and expectations for rescheduling or canceling    Patient denies any needs or referrals at this time. Prescriptions for Zofran, Compazine, and EMLA were previously sent electronically to the local pharmacy. Proper use and frequency of these meds were reviewed with patient and patient verbalized understanding of the treatment recommendations. Patient asked appropriate questions and was very involved and engaged during the educational session. There were no barriers to learning that were observed or demonstrated during the encounter. Preference in learning style assessed as visual, written and verbal.  Patient acknowledged readiness to learn by responding appropriately to open ended questions about chemo education, disease process, treatment plan and possible side effects, etc.  Patient offered feedback on learning and the educational encounter. Pt acknowledges the importance of and agrees to adhering to the treatment plan regimen. Upcoming appointments for  were reviewed with the patient. Time was provided for the patient to ask questions. Hannah Hyde verbalized understanding of the treatment plan. Vitals:    Vitals:    22 1254   BP: (!) 154/67   Pulse: 76   Resp: 22   Temp: 98.3 °F (36.8 °C)   SpO2: 95%       Informed Consent for Administration of Chemotherapy or Biotherapy for Leena Neff was signed and scanned into Epic under the Media tab. Total time spent for chemo education/counselin minutes, 100% in direct counseling.             Zoanne Hodgkin, NP  [facility]  [address] [place] [address]  Office : [phone]  Fax : [phone]

## 2022-08-08 NOTE — PROGRESS NOTES
I spoke with Coco Oconnor via the telephone regarding her Medicare and [employer] coverage, potential oral medication authorizations, enrollment in the [employer] ([employer]) and the [employer] ([address]), and assistance organization resource sheet. The patient will review the cancer programs. Next, I spoke with Coco Oconnor regarding the Oncology Care Model Notification Letter. I answered questions regarding the costs associated with Medicare Benefits. I explained to Coco Oconnor the estimated cost of treatment and services for six months under the [employer]. Coco Oconnor was advised to contact Medicare or their healthcare provider for questions or concerns related to service of care. The Oncology Care Model provides different options of contact for Coco Oconnor regarding concerns and complaints of treatments and services. The cost of 6 months of medical treatments and services can be estimated to be $7,311.00. Next, I spoke with Coco Oconnor regarding her Prescription Drug Benefits. Next, I spoke with Coco Anastasia about billing questions and treatment services. We discussed copayments, deductibles, and out of pocket maximums. Next, I spoke with Coco Anastasia regarding the Limited Power of [employer] document. After reading the form, Coco Oconnor stated she will consider signing the [address] document at her next visit. Next, we discussed costs associated with the Carbo/Taxol Medications. Next, I spoke with Coco Anastasia regarding potential oral medication authorizations. I told her that if she ever had any problems getting her oral medications filled to give the dedicated [facility]  a call. Most of the time, it is simply an authorization that needs to be done with the insurance company.   Lastly, I explained to Coco garcia form with various resource organizations that could assist with specific needs (example:  transportation, lodging, preparing meals, home cleaning)  Abhi Cherry will receive a folder with agency contact information, LPOA, cancer resources, oncology care model letter, and my business card. Abhi Cherry expressed understanding of the information above and all questions were answered to her satisfaction.

## 2022-08-09 ENCOUNTER — OFFICE VISIT (OUTPATIENT)
Dept: ONCOLOGY | Age: 72
End: 2022-08-09
Payer: MEDICARE

## 2022-08-09 VITALS
HEIGHT: 66 IN | TEMPERATURE: 98.3 F | WEIGHT: 129.6 LBS | BODY MASS INDEX: 20.83 KG/M2 | SYSTOLIC BLOOD PRESSURE: 154 MMHG | DIASTOLIC BLOOD PRESSURE: 67 MMHG | OXYGEN SATURATION: 95 % | HEART RATE: 76 BPM | RESPIRATION RATE: 22 BRPM

## 2022-08-09 DIAGNOSIS — C34.90 NON-SMALL CELL LUNG CANCER, UNSPECIFIED LATERALITY (HCC): Primary | ICD-10-CM

## 2022-08-09 DIAGNOSIS — Z71.9 HEALTH EDUCATION/COUNSELING: ICD-10-CM

## 2022-08-09 PROCEDURE — G8427 DOCREV CUR MEDS BY ELIG CLIN: HCPCS | Performed by: NURSE PRACTITIONER

## 2022-08-09 PROCEDURE — 99214 OFFICE O/P EST MOD 30 MIN: CPT | Performed by: NURSE PRACTITIONER

## 2022-08-09 PROCEDURE — 1036F TOBACCO NON-USER: CPT | Performed by: NURSE PRACTITIONER

## 2022-08-09 PROCEDURE — 3017F COLORECTAL CA SCREEN DOC REV: CPT | Performed by: NURSE PRACTITIONER

## 2022-08-09 PROCEDURE — 1123F ACP DISCUSS/DSCN MKR DOCD: CPT | Performed by: NURSE PRACTITIONER

## 2022-08-09 PROCEDURE — 1090F PRES/ABSN URINE INCON ASSESS: CPT | Performed by: NURSE PRACTITIONER

## 2022-08-09 PROCEDURE — G8420 CALC BMI NORM PARAMETERS: HCPCS | Performed by: NURSE PRACTITIONER

## 2022-08-09 PROCEDURE — G8400 PT W/DXA NO RESULTS DOC: HCPCS | Performed by: NURSE PRACTITIONER

## 2022-08-09 ASSESSMENT — PATIENT HEALTH QUESTIONNAIRE - PHQ9
SUM OF ALL RESPONSES TO PHQ QUESTIONS 1-9: 0
1. LITTLE INTEREST OR PLEASURE IN DOING THINGS: 0
SUM OF ALL RESPONSES TO PHQ9 QUESTIONS 1 & 2: 0
2. FEELING DOWN, DEPRESSED OR HOPELESS: 0
SUM OF ALL RESPONSES TO PHQ QUESTIONS 1-9: 0

## 2022-08-16 ENCOUNTER — OFFICE VISIT (OUTPATIENT)
Dept: PULMONOLOGY | Age: 72
End: 2022-08-16
Payer: MEDICARE

## 2022-08-16 VITALS
BODY MASS INDEX: 21.33 KG/M2 | SYSTOLIC BLOOD PRESSURE: 110 MMHG | HEIGHT: 65 IN | WEIGHT: 128 LBS | OXYGEN SATURATION: 100 % | TEMPERATURE: 97.6 F | HEART RATE: 63 BPM | DIASTOLIC BLOOD PRESSURE: 72 MMHG

## 2022-08-16 DIAGNOSIS — C34.90 NON-SMALL CELL LUNG CANCER, UNSPECIFIED LATERALITY (HCC): ICD-10-CM

## 2022-08-16 DIAGNOSIS — J96.11 CHRONIC RESPIRATORY FAILURE WITH HYPOXIA (HCC): ICD-10-CM

## 2022-08-16 DIAGNOSIS — J44.9 COPD, VERY SEVERE (HCC): Primary | ICD-10-CM

## 2022-08-16 PROCEDURE — 1123F ACP DISCUSS/DSCN MKR DOCD: CPT | Performed by: INTERNAL MEDICINE

## 2022-08-16 PROCEDURE — 1036F TOBACCO NON-USER: CPT | Performed by: INTERNAL MEDICINE

## 2022-08-16 PROCEDURE — 99214 OFFICE O/P EST MOD 30 MIN: CPT | Performed by: INTERNAL MEDICINE

## 2022-08-16 PROCEDURE — G8400 PT W/DXA NO RESULTS DOC: HCPCS | Performed by: INTERNAL MEDICINE

## 2022-08-16 PROCEDURE — 3023F SPIROM DOC REV: CPT | Performed by: INTERNAL MEDICINE

## 2022-08-16 PROCEDURE — G8420 CALC BMI NORM PARAMETERS: HCPCS | Performed by: INTERNAL MEDICINE

## 2022-08-16 PROCEDURE — G8427 DOCREV CUR MEDS BY ELIG CLIN: HCPCS | Performed by: INTERNAL MEDICINE

## 2022-08-16 PROCEDURE — 1090F PRES/ABSN URINE INCON ASSESS: CPT | Performed by: INTERNAL MEDICINE

## 2022-08-16 PROCEDURE — 3017F COLORECTAL CA SCREEN DOC REV: CPT | Performed by: INTERNAL MEDICINE

## 2022-08-16 RX ORDER — BUDESONIDE AND FORMOTEROL FUMARATE DIHYDRATE 160; 4.5 UG/1; UG/1
2 AEROSOL RESPIRATORY (INHALATION) 2 TIMES DAILY
Qty: 10.2 G | Refills: 11 | Status: SHIPPED | OUTPATIENT
Start: 2022-08-16

## 2022-08-16 NOTE — PROGRESS NOTES
Patient Name:  April Reyez      YOB: 1950  MRN: [id number]                       Office Visit 8/16/2022    ASSESSMENT AND PLAN:  (Medical Decision Making)    -refilled symbicort, spiriva, cont daliresp, prn albuterol.   -cont 2-3L O2.   -following with med and rad onc for lung cancer treatment. F/u in 3 months. Alycia De Luna was seen today for follow-up from hospital.    Diagnoses and all orders for this visit:    COPD, very severe ([address]    Chronic respiratory failure with hypoxia ([facility]    Non-small cell lung cancer, unspecified laterality ([facility]    Other orders  -     tiotropium (SPIRIVA RESPIMAT) 2.5 MCG/ACT AERS inhaler; Inhale 2 puffs into the lungs in the morning.  -     budesonide-formoterol (SYMBICORT) 160-4.5 MCG/ACT AERO; Inhale 2 puffs into the lungs in the morning and 2 puffs before bedtime. Derrick Sheth MD    Total time for encounter on day of encounter was 20 minutes. This time includes chart prep, review of tests/procedures, review of other provider's notes, documentation and counseling patient regarding disease process and medications. _________________________________________________________________________    HISTORY OF PRESENT ILLNESS:    Ms. Mccartney Tickfaw in our clinic today who presents with a Follow-Up from [address] [place] [address]   She has a history of very severe COPD with centrilobular emphysema, on nocturnal O2. She had a navigation bronchoscopy with attempts at biopsy of a JHONY nodule 2/11/20 which was difficult to visualize with radial probe and ultimately was non diagnostic. PET scan showed this area to have an SUV of 2.1. She was to have a bronchoscopy attempt at [facility] in July but repeat imaging performed here showed improvement in this area so the bronchoscopy has been cancelled. Was referred to [facility] for eval for lung transplant ultimately felt not to be a lung transplant candidate.   Was then seen by Dr Ania Rivera with [address] and also felt not to be a transplant candidate. June 2022 she had a CT scan with increase LLL mass and L hilar lymph node. She had bronch with EBUS and nic showing adenocarcinoma in 11R. She has been seen by heme/onc. She had a port placed and is scheduled to go on 8/22 for simulation for radiation with chemo and rad to start Sept 7. She states that her breathing feels about like usual.   On 2L O2 at rest and 3L with heavy activity. Still using symbicort, daliresp, spiriva, albuterol. She is using albuterol neb typically 4 times a day and does better with this as well. She met with palliative care last week. REVIEW OF SYSTEMS: 10 point review of systems is negative except as reported in HPI. PHYSICAL EXAM: Body mass index is 21.3 kg/m². Vitals:    08/16/22 0800   BP: 110/72   Pulse: 63   Temp: 97.6 °F (36.4 °C)   TempSrc: Skin   SpO2: 100%   Weight: 128 lb (58.1 kg)   Height: 5' 5\" (1.651 m)     PERTINENT FINDINGS:   NAD, sitting up in wheelchair. Minimal L>R rhonchi. No wheeze. RRR, no m/r/g. Trace B LE edema. DIAGNOSTIC TESTS:                                                                                    LABS:   Lab Results   Component Value Date/Time    WBC 7.5 07/26/2022 01:49 PM    HGB 10.2 07/26/2022 01:49 PM    HCT 32.8 07/26/2022 01:49 PM     07/26/2022 01:49 PM    TSH 0.017 03/16/2022 12:15 PM    Starr Mole 3,942 06/09/2022 06:00 AM     Imaging:  CXR:   XR CHEST PORTABLE 06/09/2022    Narrative  EXAM: Chest x-ray. INDICATION: Dyspnea. COMPARISON: Prior CT chest on February 26, 2022. TECHNIQUE: 2 frontal views of the chest were obtained. FINDINGS: The lungs are hyperexpanded, consistent with emphysematous changes on  the prior CT chest. Areas of nodular consolidation in the left lower lobe are  unchanged. No acute infiltrate or pulmonary edema is seen.  The heart size,  mediastinal contour and pulmonary vasculature are normal. There are coronary  artery calcifications or stents. The thorax or pleural effusion is seen. Impression  1. Emphysema. 2. Coronary artery disease. 3. Unchanged nodular consolidation in the left lower lobe. CT Chest:   CT CHEST PULMONARY EMBOLISM W CONTRAST 06/11/2022    Narrative  CT chest with contrast (pulmonary embolism protocol)    History: COPD with acute exacerbation    Technique: Helically acquired images were obtained from the lung apices to the  domes of the diaphragms reconstructed at 2.5mm intervals after the uneventful  administration of 100 cc's intravenous Isovue-370 in order to evaluate the  pulmonary arteries. Coronal and sagittal reformatted images were submitted. Radiation dose reduction techniques were used for this study:  Our CT scanners  use one or all of the following: Automated exposure control, adjustment of the  mA and/or kVp according to patient's size, iterative reconstruction. Comparison: 02/26/2022    Findings: There is no pleural or pericardial effusion. The heart and great  vessels are unremarkable. There are no filling defects within the pulmonary  arteries to suggest pulmonary emboli. Moderate emphysematous changes are  present. Patchy interstitial opacity is present within the right upper lobe  anteriorly. Similar but less impressive findings are newly visualized within the  left upper lobe anteriorly. Again noted is the ill-defined masslike opacity within the left lower lobe  measuring approximately 2.0 x 3.6 cm previously measured at approximately 1.7 x  2.8 cm. Also is a ground glass opacity within the left upper lobe measuring 1.5  cm, unchanged when remeasured on the previous study. A left hilar lymph node  measures 1.4 cm in short axis, unchanged. There is a small left Bochdalek hernia containing fat. Imaging of the upper  abdomen is unremarkable. There are compression fractures within the thoracic and  lumbar spine with mild height loss.  The fractures at T10 and T11 has occurred in  the interim. Impression  1. No evidence of pulmonary embolism. 2. Interval increase in left lower lobe mass suspicious for bronchogenic  carcinoma. A left hilar lymph node also is enlarged concerning for metastatic  adenopathy. 3. Newly visualized interstitial opacities within the upper lobes, right greater  than left, most likely infectious. 4. Stable left lower lobe groundglass opacity. 5. Interval compression fractures with mild height loss at T10 and T11. Nuclear Medicine:   PET CT SKULL BASE TO MID THIGH 06/24/2022    Narrative  PET/CT: 6/24/2022    INDICATION: Initial diagnosis of lung mass. TECHNIQUE: After oral administration of gastroview and intravenous  administration of 12.28 mCi of F18 FDG, noncontrast CT images were obtained for  attenuation correction and for fusion with emission PET images. A series of  overlapping emission PET images were then obtained beginning 60 minutes after  injection of FDG. The area imaged spanned the region from the skull base to the  mid thighs. All CT scans performed at this facility use one or all of the  following: Automated exposure control, adjustment of the mA and/or kVp according  to patient's size, iterative reconstruction. COMPARISON: CT chest 6/11/2022    FINDINGS:  NECK/CHEST:  No suspicious activity is seen in the neck. Only minimal nonfocal activity is  seen about left posterior facets in the mid cervical spine likely related to  benign degenerative changes. No clear aggressive osseous lesion is suggested on  limited CT images. The most clear suspicious activity is seen associated with a large heterogeneous  lesion in the left lower lobe demonstrating spiculated appearing components best  appreciated on axial image 92 measuring 4.4 cm x 2.3 cm in greatest transverse  dimensions and with a maximum SUV of 11.2 g/mL. Malignancy is not excluded given  the appearance.  Additional focal appearing activity is seen in the left hilum on  pet/CT image 85 with a maximum SUV of 9.1 g/mL and in the subcarinal mediastinum  on axial image 84 with a maximum SUV of 6.3 g/mL concerning for left hilar and  subcarinal mediastinal diego metastases, respectively. Additional mild activity  is seen in the mediastinum and left hilum on pet/CT image 76 which does appear  to correspond to small prevascular and left hilar lymph nodes seen on axial CT  image 39 of the prior the chest dated 6/11/2022 which are suspicious for  additional early diego metastases. No clearly suspicious activity is otherwise  seen in the chest. Mild to moderate activity is seen in the medial right upper  lobe on pet/CT image 56 with a maximum SUV of 4.8 g/mL. Although the degree of  activity is suspicious, this appears to correspond to a fluid containing cystic  structure in the right upper lobe with a CT appearance being more suggestive of  an inflammatory process. Additional soft tissue activity is seen over the  lateral scapula which is most consistent with benign muscle activity. ABDOMEN/PELVIS:  The only potentially suspicious activity is skin activity involving the left  proximal medial thigh seen on pet/CT image 257 with a maximum SUV of 6.3 g/mL  which appears to be associated with focal skin thickening. This is not felt to  be related to the patient's lung cancer. However, a dermal neoplasm or focal  inflammatory process is not excluded given the appearance. This should be  further assessed with direct visualization. No suspicious activity is otherwise  seen below the diaphragms. Activity is seen adjacent to the right femoral neck. However, this appears to correspond to heterotopic bone formation from hip  surgery at this level without aggressive features suggested therefore is not  worrisome. No focal hepatic activity is seen. No contour deforming, or  hypermetabolic adrenal lesion is seen. Advanced atherosclerotic calcification is  seen of the abdominal aorta. Impression  1.  Heterogeneous left lower lobe mass demonstrating irregular spiculated  components and measuring 4.4 cm x 2.3 cm in greatest transverse dimensions and  with a maximum SUV of 11.2 g/mL. Malignancy is not excluded given the  appearance. Further evaluation as clinically indicated is recommended. 2. Additional left hilar and subcarinal mediastinal activity concerning for  diego metastases. Additional mild activity is seen within a prevascular  mediastinal lymph node an additional left hilar lymph nodes for which additional  early diego metastases are not excluded. 3. Focal activity associated with focal skin thickening in the most medial,  proximal left thigh seen on pet/CT image 257. This is unlikely to be related to  potential lung cancer although could represent an additional dermal neoplasm or  focal abnormal inflammatory process otherwise. This should be clinically  evaluated with direct visualization. This has been marked on image to assist  with clinical correlation. 4. Mild activity associated with a cystic abnormality in the medial right upper  lobe. Although the degree of activity is suspicious (maximum SUV of 4.8 g/mL)  the CT features are more consistent with a potential inflammatory process. Attention on follow-up studies is recommended. PFTs:   No flowsheet data found. No results found for this or any previous visit. No results found for this or any previous visit. FeNO: No results found for this or any previous visit. Exercise Oximetry: No results found for this or any previous visit. Echo:   TRANSTHORACIC ECHOCARDIOGRAM (TTE) COMPLETE (CONTRAST/BUBBLE/3D PRN) 02/06/2022    Narrative  This is a summary report. The complete report is available in the patient's medical record. If you cannot access the medical record, please contact the sending organization for a detailed fax or copy. Left Ventricle: Left ventricle size is normal. Normal wall thickness. Normal wall motion.  Low normal left ventricular systolic function. EF by 2D Simpsons Biplane is 52%. Normal diastolic function. Aortic Valve: Mildly thickened cusps. Mitral Valve: Mildly thickened leaflets. Mildly calcified anterior leaflet. Moderate to severe transvalvular regurgitation with an eccentrically directed jet and may underestimate severity. Transesophageal echo  recommended for further evaluation of potentially severe mitral regurgitation. Aorta: Dilated annulus. [facility] Reference Info:                                                                                                                  Past Medical History:   Diagnosis Date    Acute respiratory failure with hypoxia ([address] 08/22/2013    Asthma     CAD (coronary artery disease)     Followed by [facility]. Chest pain 05/31/2019    pt denies any reent CP or increase SOB    Closed right hip fracture ([address] 02/04/2022    COPD with acute lower respiratory infection ([address] 02/10/2022    severe COPD with centrilobular emphysema, on nocturnal O2.     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    SDH (subdural hematoma) ([address] 03/18/2022    hx of    Thyroid disease       Tobacco Use: Medium Risk    Smoking Tobacco Use: Former    Smokeless Tobacco Use: Never     Allergies   Allergen Reactions    Promethazine Nausea And Vomiting and Other (See Comments)     Severe vomiting      Current Outpatient Medications   Medication Instructions    acetaminophen (TYLENOL) 325 MG tablet Oral, EVERY 4 HOURS PRN    aspirin 81 mg, Oral, DAILY    atorvastatin (LIPITOR) 80 mg, Oral, Nightly, TAKE 1 TABLET BY MOUTH DAILY    budesonide-formoterol (SYMBICORT) 160-4.5 MCG/ACT AERO 2 puffs, Inhalation, 2 TIMES DAILY    butalbital-acetaminophen-caffeine (FIORICET, ESGIC) -40 MG per tablet 1 tablet, EVERY 6 HOURS PRN    cetirizine (ZYRTEC) 10 mg, Oral, DAILY PRN    clopidogrel (PLAVIX) 75 mg, Oral, DAILY    escitalopram (LEXAPRO) 10 mg, Oral, DAILY    ezetimibe (ZETIA) 10 mg, Oral, Nightly    lidocaine-prilocaine (EMLA) 2.5-2.5 % cream Apply topically as needed.     methIMAzole (TAPAZOLE) 10 mg, Oral, DAILY    metoprolol succinate (TOPROL XL) 100 mg, Oral, DAILY    nitroGLYCERIN (NITROSTAT) 0.4 mg, SubLINGual, 3 TIMES DAILY PRN    ondansetron (ZOFRAN ODT) 4 mg, Oral, EVERY 8 HOURS PRN    OXYGEN Inhalation, 2 LPM    pantoprazole (PROTONIX) 40 mg, Oral, 2 TIMES DAILY    predniSONE (DELTASONE) 10 mg, Oral, DAILY WITH BREAKFAST    prochlorperazine (COMPAZINE) 10 mg, Oral, EVERY 6 HOURS PRN    Pseudoephedrine-guaiFENesin (MUCINEX D PO) Oral, 2 times daily    Roflumilast (DALIRESP) 500 mcg, Oral, NIGHTLY    tiotropium (SPIRIVA RESPIMAT) 2.5 MCG/ACT AERS inhaler 2 puffs, Inhalation, DAILY    torsemide (DEMADEX) 10 mg, Oral, DAILY    traMADol (ULTRAM) 50 mg, Oral, 3 times daily, Z51.5 Palliative Care

## 2022-08-22 ENCOUNTER — HOSPITAL ENCOUNTER (OUTPATIENT)
Dept: RADIATION ONCOLOGY | Age: 72
Setting detail: RECURRING SERIES
Discharge: HOME | End: 2022-08-25
Payer: MEDICARE

## 2022-08-22 PROCEDURE — 77470 SPECIAL RADIATION TREATMENT: CPT

## 2022-08-24 ENCOUNTER — HOSPITAL ENCOUNTER (OUTPATIENT)
Dept: RADIATION ONCOLOGY | Age: 72
Setting detail: RECURRING SERIES
Discharge: HOME | End: 2022-08-27
Payer: MEDICARE

## 2022-08-24 PROCEDURE — 77301 RADIOTHERAPY DOSE PLAN IMRT: CPT

## 2022-08-24 PROCEDURE — 77293 RESPIRATOR MOTION MGMT SIMUL: CPT

## 2022-08-24 PROCEDURE — 77399 UNLISTED PX MED RADJ PHYSICS: CPT

## 2022-08-24 PROCEDURE — 77338 DESIGN MLC DEVICE FOR IMRT: CPT

## 2022-08-24 PROCEDURE — 77300 RADIATION THERAPY DOSE PLAN: CPT

## 2022-09-05 DIAGNOSIS — Z11.59 SCREENING FOR VIRAL DISEASE: Primary | ICD-10-CM

## 2022-09-06 ENCOUNTER — CLINICAL DOCUMENTATION (OUTPATIENT)
Dept: ONCOLOGY | Age: 72
End: 2022-09-06

## 2022-09-06 ENCOUNTER — HOSPITAL ENCOUNTER (OUTPATIENT)
Dept: LAB | Age: 72
Discharge: HOME | End: 2022-09-09
Payer: MEDICARE

## 2022-09-06 ENCOUNTER — OFFICE VISIT (OUTPATIENT)
Dept: ONCOLOGY | Age: 72
End: 2022-09-06
Payer: MEDICARE

## 2022-09-06 VITALS
DIASTOLIC BLOOD PRESSURE: 75 MMHG | SYSTOLIC BLOOD PRESSURE: 128 MMHG | BODY MASS INDEX: 21.66 KG/M2 | HEART RATE: 62 BPM | TEMPERATURE: 97.7 F | HEIGHT: 66 IN | WEIGHT: 134.8 LBS | OXYGEN SATURATION: 97 % | RESPIRATION RATE: 14 BRPM

## 2022-09-06 DIAGNOSIS — Z11.59 SCREENING FOR VIRAL DISEASE: ICD-10-CM

## 2022-09-06 DIAGNOSIS — C34.90 NON-SMALL CELL LUNG CANCER, UNSPECIFIED LATERALITY (HCC): ICD-10-CM

## 2022-09-06 DIAGNOSIS — C34.90 NON-SMALL CELL LUNG CANCER, UNSPECIFIED LATERALITY (HCC): Primary | ICD-10-CM

## 2022-09-06 DIAGNOSIS — C78.1 MEDIASTINAL METASTASIS (HCC): ICD-10-CM

## 2022-09-06 DIAGNOSIS — E05.90 HYPERTHYROIDISM: ICD-10-CM

## 2022-09-06 DIAGNOSIS — Z79.899 HIGH RISK MEDICATION USE: ICD-10-CM

## 2022-09-06 LAB
ALBUMIN SERPL-MCNC: 3.5 G/DL (ref 3.2–4.6)
ALBUMIN/GLOB SERPL: 0.9 {RATIO} (ref 1.2–3.5)
ALP SERPL-CCNC: 62 U/L (ref 50–136)
ALT SERPL-CCNC: 23 U/L (ref 12–65)
ANION GAP SERPL CALC-SCNC: 6 MMOL/L (ref 4–13)
AST SERPL-CCNC: 22 U/L (ref 15–37)
BASOPHILS # BLD: 0 K/UL (ref 0–0.2)
BASOPHILS NFR BLD: 1 % (ref 0–2)
BILIRUB SERPL-MCNC: 0.3 MG/DL (ref 0.2–1.1)
BUN SERPL-MCNC: 13 MG/DL (ref 8–23)
CALCIUM SERPL-MCNC: 9 MG/DL (ref 8.3–10.4)
CEA SERPL-MCNC: 4.4 NG/ML (ref 0–3)
CHLORIDE SERPL-SCNC: 105 MMOL/L (ref 101–110)
CO2 SERPL-SCNC: 28 MMOL/L (ref 21–32)
CREAT SERPL-MCNC: 1 MG/DL (ref 0.6–1)
DIFFERENTIAL METHOD BLD: ABNORMAL
EOSINOPHIL # BLD: 0.1 K/UL (ref 0–0.8)
EOSINOPHIL NFR BLD: 1 % (ref 0.5–7.8)
ERYTHROCYTE [DISTWIDTH] IN BLOOD BY AUTOMATED COUNT: 17.8 % (ref 11.9–14.6)
GLOBULIN SER CALC-MCNC: 3.9 G/DL (ref 2.3–3.5)
GLUCOSE SERPL-MCNC: 115 MG/DL (ref 65–100)
HCT VFR BLD AUTO: 37.8 % (ref 35.8–46.3)
HGB BLD-MCNC: 11.5 G/DL (ref 11.7–15.4)
IMM GRANULOCYTES # BLD AUTO: 0 K/UL (ref 0–0.5)
IMM GRANULOCYTES NFR BLD AUTO: 0 % (ref 0–5)
LYMPHOCYTES # BLD: 1.3 K/UL (ref 0.5–4.6)
LYMPHOCYTES NFR BLD: 19 % (ref 13–44)
MAGNESIUM SERPL-MCNC: 2.3 MG/DL (ref 1.8–2.4)
MCH RBC QN AUTO: 28.6 PG (ref 26.1–32.9)
MCHC RBC AUTO-ENTMCNC: 30.4 G/DL (ref 31.4–35)
MCV RBC AUTO: 94 FL (ref 79.6–97.8)
MONOCYTES # BLD: 0.6 K/UL (ref 0.1–1.3)
MONOCYTES NFR BLD: 9 % (ref 4–12)
NEUTS SEG # BLD: 4.7 K/UL (ref 1.7–8.2)
NEUTS SEG NFR BLD: 70 % (ref 43–78)
NRBC # BLD: 0 K/UL (ref 0–0.2)
PLATELET # BLD AUTO: 271 K/UL (ref 150–450)
PMV BLD AUTO: 9.3 FL (ref 9.4–12.3)
POTASSIUM SERPL-SCNC: 4.1 MMOL/L (ref 3.5–5.1)
PROT SERPL-MCNC: 7.4 G/DL (ref 6.3–8.2)
RBC # BLD AUTO: 4.02 M/UL (ref 4.05–5.2)
SODIUM SERPL-SCNC: 139 MMOL/L (ref 136–145)
TSH, 3RD GENERATION: 11.7 UIU/ML (ref 0.36–3.74)
WBC # BLD AUTO: 6.7 K/UL (ref 4.3–11.1)

## 2022-09-06 PROCEDURE — G8427 DOCREV CUR MEDS BY ELIG CLIN: HCPCS | Performed by: INTERNAL MEDICINE

## 2022-09-06 PROCEDURE — 3017F COLORECTAL CA SCREEN DOC REV: CPT | Performed by: INTERNAL MEDICINE

## 2022-09-06 PROCEDURE — 86706 HEP B SURFACE ANTIBODY: CPT

## 2022-09-06 PROCEDURE — 99215 OFFICE O/P EST HI 40 MIN: CPT | Performed by: INTERNAL MEDICINE

## 2022-09-06 PROCEDURE — 1090F PRES/ABSN URINE INCON ASSESS: CPT | Performed by: INTERNAL MEDICINE

## 2022-09-06 PROCEDURE — 86704 HEP B CORE ANTIBODY TOTAL: CPT

## 2022-09-06 PROCEDURE — 1036F TOBACCO NON-USER: CPT | Performed by: INTERNAL MEDICINE

## 2022-09-06 PROCEDURE — 85025 COMPLETE CBC W/AUTO DIFF WBC: CPT

## 2022-09-06 PROCEDURE — 83735 ASSAY OF MAGNESIUM: CPT

## 2022-09-06 PROCEDURE — G8420 CALC BMI NORM PARAMETERS: HCPCS | Performed by: INTERNAL MEDICINE

## 2022-09-06 PROCEDURE — 82378 CARCINOEMBRYONIC ANTIGEN: CPT

## 2022-09-06 PROCEDURE — 1123F ACP DISCUSS/DSCN MKR DOCD: CPT | Performed by: INTERNAL MEDICINE

## 2022-09-06 PROCEDURE — 80053 COMPREHEN METABOLIC PANEL: CPT

## 2022-09-06 PROCEDURE — 84443 ASSAY THYROID STIM HORMONE: CPT

## 2022-09-06 PROCEDURE — G8400 PT W/DXA NO RESULTS DOC: HCPCS | Performed by: INTERNAL MEDICINE

## 2022-09-06 PROCEDURE — 36415 COLL VENOUS BLD VENIPUNCTURE: CPT

## 2022-09-06 PROCEDURE — 86803 HEPATITIS C AB TEST: CPT

## 2022-09-06 PROCEDURE — 87340 HEPATITIS B SURFACE AG IA: CPT

## 2022-09-06 RX ORDER — ALBUTEROL SULFATE 2.5 MG/3ML
SOLUTION RESPIRATORY (INHALATION)
COMMUNITY
Start: 2022-08-26

## 2022-09-06 NOTE — PROGRESS NOTES
[facility] Hematology & Oncology: Office Visit Progress Note    Chief Complaint:    Non-small cell lung cancer stage III    History of Present Illness:  Reason for Referral:  Non-small cell lung cancer     Referring Provider:  Dr. Trace Cary, [facility] Pulmonary and Critical Care     Primary Care Provider:  Dr. Inna Hernandez, [facility])     Family History of Cancer/Hematologic Disorders:  No documented family history     Presenting Symptoms:   Presented to [facility] ED with COPD exacerbation     Ms. Darnell Bundy is a 66 y.o.  female with a medical history of COPD, asthma, oxygen dependent, CAD, closed right hip and femur fracture (2/22), subdural hematoma (3/22), MI, and thyroid disease. Surgical history includes bilateral cataract removal, right hip surgery, and right carotid endarterectomy. She is a former smoker of 50 years but quit in 2017. Denies alcohol use. On 6/9/22 patient presented to the [facility] ED with increased shortness of breath over several days. Admitted with exacerbation of COPD. CXR showed unchanged consolidation of left lower lobe lung. On 6/11 CT Chest PE protocol was performed which revealed no PE but a increase in the left lower lobe lung mass. She was discharged home on 6/12 with instruction to follow up with pulmonary within 1 week. On 6/24/22 she had a PET scan which showed known lung mass and left hilar and subcarinal mediastinal activity as well. On 6/28/22 she was seen in follow up with pulmonary. PET imaging reviewed. Recommended EBUS. Procedure was performed on 7/20/22. Pathology showed malignant cells 11L lymph node. Referral to medical and radiation oncology for evaluation and treatment recommendations.       MRI Brain scheduled on 8/2 and radiation oncology consult on 7/29.       6/9/22 [facility] ED CXR  FINDINGS: The lungs are hyperexpanded, consistent with emphysematous changes on the prior CT chest. Areas of nodular consolidation in the left lower lobe are unchanged. No acute infiltrate or pulmonary edema is seen. The heart size, mediastinal contour and pulmonary vasculature are normal. There are coronary artery calcifications or stents. The thorax or pleural effusion is seen. IMPRESSION:  1. Emphysema. 2. Coronary artery disease. 3. Unchanged nodular consolidation in the left lower lobe. 6/11/22 [facility] CT Chest PE protocol  Findings: There is no pleural or pericardial effusion. The heart and great vessels are unremarkable. There are no filling defects within the pulmonary arteries to suggest pulmonary emboli. Moderate emphysematous changes are present. Patchy interstitial opacity is present within the right upper lobe  anteriorly. Similar but less impressive findings are newly visualized within the left upper lobe anteriorly. Again noted is the ill-defined masslike opacity within the left lower lobe measuring approximately 2.0 x 3.6 cm previously measured at approximately 1.7 x 2.8 cm. Also is a ground glass opacity within the left upper lobe measuring 1.5 cm, unchanged when remeasured on the previous study. A left hilar lymph node  measures 1.4 cm in short axis, unchanged. There is a small left Bochdalek hernia containing fat. Imaging of the upper abdomen is unremarkable. There are compression fractures within the thoracic and lumbar spine with mild height loss. The fractures at T10 and T11 has occurred in the interim. IMPRESSION:  1. No evidence of pulmonary embolism. 2. Interval increase in left lower lobe mass suspicious for bronchogenic carcinoma. A left hilar lymph node also is enlarged concerning for metastatic adenopathy. 3. Newly visualized interstitial opacities within the upper lobes, right greater than left, most likely infectious. 4. Stable left lower lobe groundglass opacity. 5.  Interval compression fractures with mild height loss at T10 and T11.     6/24/22 [facility] PET  FINDINGS:  NECK/CHEST:  No suspicious activity is seen in the neck. Only minimal nonfocal activity is seen about left posterior facets in the mid cervical spine likely related to benign degenerative changes. No clear aggressive osseous lesion is suggested on limited CT images. The most clear suspicious activity is seen associated with a large heterogeneous lesion in the left lower lobe demonstrating spiculated appearing components best appreciated on axial image 92 measuring 4.4 cm x 2.3 cm in greatest transverse dimensions and with a maximum SUV of 11.2 g/mL. Malignancy is not excluded given the appearance. Additional focal appearing activity is seen in the left hilum on  pet/CT image 85 with a maximum SUV of 9.1 g/mL and in the subcarinal mediastinum on axial image 84 with a maximum SUV of 6.3 g/mL concerning for left hilar and subcarinal mediastinal diego metastases, respectively. Additional mild activity is seen in the mediastinum and left hilum on pet/CT image 76 which does appear to correspond to small prevascular and left hilar lymph nodes seen on axial CT  image 39 of the prior the chest dated 6/11/2022 which are suspicious for additional early diego metastases. No clearly suspicious activity is otherwise seen in the chest. Mild to moderate activity is seen in the medial right upper lobe on pet/CT image 56 with a maximum SUV of 4.8 g/mL. Although the degree of activity is suspicious, this appears to correspond to a fluid containing cystic structure in the right upper lobe with a CT appearance being more suggestive of an inflammatory process. Additional soft tissue activity is seen over the lateral scapula which is most consistent with benign muscle activity. ABDOMEN/PELVIS:  The only potentially suspicious activity is skin activity involving the left  proximal medial thigh seen on pet/CT image 257 with a maximum SUV of 6.3 g/mL which appears to be associated with focal skin thickening.  This is not felt to be related to the patient's lung cancer. However, a dermal neoplasm or focal  inflammatory process is not excluded given the appearance. This should be further assessed with direct visualization. No suspicious activity is otherwise seen below the diaphragms. Activity is seen adjacent to the right femoral neck. However, this appears to correspond to heterotopic bone formation from hip  surgery at this level without aggressive features suggested therefore is not worrisome. No focal hepatic activity is seen. No contour deforming, or hypermetabolic adrenal lesion is seen. Advanced atherosclerotic calcification is seen of the abdominal aorta. IMPRESSION:  1. Heterogeneous left lower lobe mass demonstrating irregular spiculated components and measuring 4.4 cm x 2.3 cm in greatest transverse dimensions and with a maximum SUV of 11.2 g/mL. Malignancy is not excluded given the appearance. Further evaluation as clinically indicated is recommended. 2. Additional left hilar and subcarinal mediastinal activity concerning for diego metastases. Additional mild activity is seen within a prevascular mediastinal lymph node an additional left hilar lymph nodes for which additional early diego metastases are not excluded. 3. Focal activity associated with focal skin thickening in the most medial, proximal left thigh seen on pet/CT image 257. This is unlikely to be related to potential lung cancer although could represent an additional dermal neoplasm or focal abnormal inflammatory process otherwise. This should be clinically  evaluated with direct visualization. This has been marked on image to assist with clinical correlation. 4. Mild activity associated with a cystic abnormality in the medial right upper lobe. Although the degree of activity is suspicious (maximum SUV of 4.8 g/mL) the CT features are more consistent with a potential inflammatory process. Attention on follow-up studies is recommended. respiratory infection ([facility] [address] 02/10/2022    severe COPD with centrilobular emphysema, on nocturnal O2.     EKG, abnormal 02/15/2022    Femur fracture, right ([address] 2022    Former cigarette smoker     H/O heart artery stent     several stents- last stents placed 2022    History of MI (myocardial infarction)     Lung mass     Multiple closed anterior-posterior compression fractures of pelvis ([address] 2022    Other fracture of right femur, initial encounter for closed fracture ([address] 2022    Oxygen dependent     2L NC continuous    Post-traumatic subdural hematoma ([facility] [address] 03/15/2022    SDH (subdural hematoma) ([facility] [address] 2022    hx of    Thyroid disease      Past Surgical History:   Procedure Laterality Date    BRONCHOSCOPY N/A 2022    BRONCHOSCOPY ENDOBRONCHIAL ULTRASOUND performed by Gage Gil MD at [facility] ENDOSCOPY    CAROTID ENDARTERECTOMY Right     CATARACT REMOVAL Bilateral     HIP SURGERY Right     IR PORT PLACEMENT EQUAL OR GREATER THAN 5 YEARS  2022    IR PORT PLACEMENT EQUAL OR GREATER THAN 5 YEARS 2022 [facility] SPECIALS    WISDOM TOOTH EXTRACTION      as a teenagers     Family History   Adopted: Yes   Problem Relation Age of Onset    No Known Problems Mother     No Known Problems Father      Social History     Socioeconomic History    Marital status:      Spouse name: Not on file    Number of children: Not on file    Years of education: Not on file    Highest education level: Not on file   Occupational History    Not on file   Tobacco Use    Smoking status: Former     Packs/day: 0.50     Years: 50.00     Pack years: 25.00     Types: Cigarettes     Quit date:      Years since quittin.6    Smokeless tobacco: Never   Substance and Sexual Activity    Alcohol use: Not Currently    Drug use: Not on file    Sexual activity: Not on file   Other Topics Concern    Not on file   Social History Narrative    Not on file     Social Determinants of Health     Financial Resource Strain: Not on file   Food Insecurity: Not on file   Transportation Needs: Not on file   Physical Activity: Not on file   Stress: Not on file   Social Connections: Not on file   Intimate Partner Violence: Not on file   Housing Stability: Not on file     Current Outpatient Medications   Medication Sig Dispense Refill    albuterol (PROVENTIL) (2.5 MG/3ML) 0.083% nebulizer solution TAKE 3 ML BY NEBULIZATION EVERY 6 HOURS AS NEEDED FOR SHORTNESS OF BREATH      tiotropium (SPIRIVA RESPIMAT) 2.5 MCG/ACT AERS inhaler Inhale 2 puffs into the lungs in the morning. 1 each 11    budesonide-formoterol (SYMBICORT) 160-4.5 MCG/ACT AERO Inhale 2 puffs into the lungs in the morning and 2 puffs before bedtime. 10.2 g 11    OXYGEN Inhale into the lungs 2 LPM      traMADol (ULTRAM) 50 MG tablet Take 1 tablet by mouth in the morning, at noon, and at bedtime for 90 days. Z51.5 Palliative Care 90 tablet 2    lidocaine-prilocaine (EMLA) 2.5-2.5 % cream Apply topically as needed.  30 g 1    ondansetron (ZOFRAN ODT) 4 MG disintegrating tablet Take 1 tablet by mouth every 8 hours as needed for Nausea or Vomiting 90 tablet 1    prochlorperazine (COMPAZINE) 10 MG tablet Take 1 tablet by mouth every 6 hours as needed (chemo-induced nausea) 90 tablet 2    predniSONE (DELTASONE) 10 MG tablet Take 10 mg by mouth daily (with breakfast)      Pseudoephedrine-guaiFENesin (MUCINEX D PO) Take by mouth in the morning and at bedtime      torsemide (DEMADEX) 10 MG tablet Take 10 mg by mouth daily      acetaminophen (TYLENOL) 325 MG tablet Take by mouth every 4 hours as needed      aspirin 81 MG chewable tablet Take 81 mg by mouth daily      atorvastatin (LIPITOR) 80 MG tablet Take 80 mg by mouth at bedtime TAKE 1 TABLET BY MOUTH DAILY      cetirizine (ZYRTEC) 10 MG tablet Take 10 mg by mouth daily as needed      clopidogrel (PLAVIX) 75 MG tablet Take 75 mg by mouth daily      escitalopram (LEXAPRO) 10 MG tablet Take 10 mg by mouth daily ezetimibe (ZETIA) 10 MG tablet Take 10 mg by mouth at bedtime      methIMAzole (TAPAZOLE) 10 MG tablet Take 10 mg by mouth daily      metoprolol succinate (TOPROL XL) 100 MG extended release tablet Take 100 mg by mouth daily      nitroGLYCERIN (NITROSTAT) 0.4 MG SL tablet Place 0.4 mg under the tongue 3 times daily as needed      pantoprazole (PROTONIX) 40 MG tablet Take 40 mg by mouth 2 times daily      Roflumilast (DALIRESP) 500 MCG tablet Take 500 mcg by mouth nightly      butalbital-acetaminophen-caffeine (FIORICET, ESGIC) -40 MG per tablet Take 1 tablet by mouth every 6 hours as needed (Patient not taking: No sig reported)       No current facility-administered medications for this visit. OBJECTIVE:  /75 (Site: Right Upper Arm, Position: Standing, Cuff Size: Medium Adult)   Pulse 62   Temp 97.7 °F (36.5 °C) (Oral)   Resp 14   Ht 5' 5.75\" (1.67 m)   Wt 134 lb 12.8 oz (61.1 kg)   SpO2 97%   BMI 21.92 kg/m²     Physical Exam:  Constitutional: Oriented to person, place, and time. Well-developed and well-nourished. HEENT: Normocephalic and atraumatic. Oropharynx is clear and moist.   Conjunctivae and EOM are normal. Pupils are equal, round, and reactive to light. No scleral icterus. Neck supple. No JVD present. No tracheal deviation present. No thyromegaly present. Lymph node No palpable submandibular, cervical, supraclavicular, axillary and inguinal lymph nodes. Skin Warm and dry. No bruising and no rash noted. No erythema. No pallor. Respiratory Effort normal and breath sounds normal.  No respiratory distress. No wheezes. No rales. No tenderness. CVS Normal rate, regular rhythm and normal heart sounds. Exam reveals no gallop, no friction and no rub. No murmur heard. Abdomen Soft. Bowel sounds are normal. Exhibits no distension. There is no tenderness. There is no rebound and no guarding. Neuro Normal reflexes. No cranial nerve deficit.   Exhibits normal muscle 0.9 (L) 1.2 - 3.5         Imaging:  No results found for this or any previous visit. ASSESSMENT/PLAN:   Diagnosis Orders   1. Non-small cell lung cancer, unspecified laterality (HCC)  TSH    CEA      2. High risk medication use  TSH      3. Mediastinal metastasis ([facility] [address]        4. Hyperthyroidism            66 y.o. F consulted for lung adenocarcinoma presented to [facility] on 7/26/2022 with . She was a longtime smoker quitted 5 years ago, COPD on home oxygen, recently found left lower lobe 4.4 cm mass and PET showed avid left hilar and subcarinal lymphadenopathy, station 7 lymphadenopathy not accessible on EBUS but biopsy of station 11 L lymph node showed lung adenocarcinoma. We discussed that this was consistent with stage III lung cancer, discussed potential curative intent therapy with concurrent chemoradiation followed by immunotherapy, all questions answered and patient is interested, we will arrange port placement and radiation oncology evaluation, discussed toxicities and management, patient takes Plavix for severe CAD with multiple coronary stents, no absolute contraindication but would need careful management, return on 9/6/2022 and ready to proceed with cycle 1, monitor bruising and platelet carefully, requested to check thyroid function and reportedly being placed on methimazole for supposed hyperthyroidism when she was hospitalized months ago without any follow-ups, will clarify and refer her to endocrinology if needed, proceed to week 1 chemoradiation and follow next week. Call as needed. All questions are answered to their satisfaction. They will call for further questions and concerns. ECOG PERFORMANCE STATUS - 1- Restricted in physically strenuous activity but ambulatory and able to carry out work of a light or sedentary nature such as light house work, office work. Pain - 0 - No pain/10. None/Minimal pain - not affecting QOL     Fatigue - No flowsheet data found.   Distress - No flowsheet data found. Total time independently spent on today's visit was 60min. This time included: face-to-face time evaluating the patient as well as additional non-face-to-face time spent on: Preparing to see the patient by obtaining and reviewing previous test results, records and medical history, Performing a medically appropriate history and exam and documenting relevant clinical information for this visit, Counseling and educating patient and family, Ordering medications, Communicating with other health care professionals and Referring patient to another health care provider. Elements of this note have been dictated via voice recognition software. Text and phrases may be limited by the accuracy and autoconversion of the software. The chart has been reviewed, but errors may still be present. Chrystine Sandhoff, M.D.   [address]  Office : [phone]  Fax : [phone]

## 2022-09-06 NOTE — PATIENT INSTRUCTIONS
Patient Instructions from Today's Visit    Reason for Visit:  Follow-up visit for c1d1 Carbo/Taxol    Diagnosis Information:  [email or web address]/. net/about-us/asco-answers-patient-education-materials/yiip-evdaxkc-znee-sheets  Patient was educated and given handouts published by [employer] entitled [employer] Answers Fact Sheets about their diagnosis of lung cancer during todays office visit. Plan: Will check into your thyroid history and follow-up. We added TSH (thyroid) and CEA (tumor marker) to your labs today. Labs reviewed; OK for infusion tomorrow. Follow Up: In 1 week    Recent Lab Results:  No visits with results within 3 Day(s) from this visit.    Latest known visit with results is:   Hospital Outpatient Visit on 07/26/2022   Component Date Value Ref Range Status    WBC 07/26/2022 7.5  4.3 - 11.1 K/uL Final    RBC 07/26/2022 3.73 (A) 4.05 - 5.2 M/uL Final    Hemoglobin 07/26/2022 10.2 (A) 11.7 - 15.4 g/dL Final    Hematocrit 07/26/2022 32.8 (A) 35.8 - 46.3 % Final    MCV 07/26/2022 87.9  79.6 - 97.8 FL Final    MCH 07/26/2022 27.3  26.1 - 32.9 PG Final    MCHC 07/26/2022 31.1 (A) 31.4 - 35.0 g/dL Final    RDW 07/26/2022 17.2 (A) 11.9 - 14.6 % Final    Platelets 97/51/5451 280  150 - 450 K/uL Final    MPV 07/26/2022 8.9 (A) 9.4 - 12.3 FL Final    nRBC 07/26/2022 0.00  0.0 - 0.2 K/uL Final    **Note: Absolute NRBC parameter is now reported with Hemogram**    Seg Neutrophils 07/26/2022 72  43 - 78 % Final    Lymphocytes 07/26/2022 18  13 - 44 % Final    Monocytes 07/26/2022 8  4.0 - 12.0 % Final    Eosinophils % 07/26/2022 1  0.5 - 7.8 % Final    Basophils 07/26/2022 1  0.0 - 2.0 % Final    Immature Granulocytes 07/26/2022 0  0.0 - 5.0 % Final    Segs Absolute 07/26/2022 5.4  1.7 - 8.2 K/UL Final    Absolute Lymph # 07/26/2022 1.3  0.5 - 4.6 K/UL Final    Absolute Mono # 07/26/2022 0.6  0.1 - 1.3 K/UL Final    Absolute Eos # 07/26/2022 0.1  0.0 - 0.8 K/UL Final    Basophils Absolute 07/26/2022 0.1  0.0 - 0.2 K/UL Final    Absolute Immature Granulocyte 07/26/2022 0.0  0.0 - 0.5 K/UL Final    Differential Type 07/26/2022 AUTOMATED    Final    Sodium 07/26/2022 139  136 - 145 mmol/L Final    Potassium 07/26/2022 3.7  3.5 - 5.1 mmol/L Final    Chloride 07/26/2022 105  98 - 107 mmol/L Final    CO2 07/26/2022 27  21 - 32 mmol/L Final    Anion Gap 07/26/2022 7  7 - 16 mmol/L Final    Glucose 07/26/2022 88  65 - 100 mg/dL Final    BUN 07/26/2022 14  8 - 23 MG/DL Final    Creatinine 07/26/2022 0.80  0.6 - 1.0 MG/DL Final    GFR  07/26/2022 >60  >60 ml/min/1.73m2 Final    GFR Non- 07/26/2022 >60  >60 ml/min/1.73m2 Final    Comment:      Estimated GFR is calculated using the Modification of Diet in Renal Disease (MDRD) Study equation, reported for both  Americans (GFRAA) and non- Americans (GFRNA), and normalized to 1.73m2 body surface area. The physician must decide which value applies to the patient. The MDRD study equation should only be used in individuals age 25 or older. It has not been validated for the following: pregnant women, patients with serious comorbid conditions,or on certain medications, or persons with extremes of body size, muscle mass, or nutritional status. Calcium 07/26/2022 9.1  8.3 - 10.4 MG/DL Final    Total Bilirubin 07/26/2022 0.4  0.2 - 1.1 MG/DL Final    ALT 07/26/2022 18  12 - 65 U/L Final    AST 07/26/2022 14 (A) 15 - 37 U/L Final    Alk Phosphatase 07/26/2022 62  50 - 136 U/L Final    Total Protein 07/26/2022 7.4  6.3 - 8.2 g/dL Final    Albumin 07/26/2022 3.3  3.2 - 4.6 g/dL Final    Globulin 07/26/2022 4.1 (A) 2.3 - 3.5 g/dL Final    Albumin/Globulin Ratio 07/26/2022 0.8 (A) 1.2 - 3.5   Final    CEA 07/26/2022 3.8 (A) 0.0 - 3.0 ng/mL Final    Comment: Nonsmoker:  <3.0 ng/mL  Smoker:     <5.0 ng/mL  [employer]. Patient's results of tumor marker testing may not be comparable to labs using different manufacturers/methods. Ferritin 07/26/2022 88  8 - 388 NG/ML Final    Iron 07/26/2022 54  35 - 150 ug/dL Final    Comment: Known Interfering Substances section:  \"Iron values may be falsely elevated in  serum samples from patients with  anticoagulants (e.g., hemodialysis patients). \"  Limitations of Procedure section:  \"Turbidity resulting from precipitation of  fibrinogen in the serum of patients treated  with anticoagulants (e.g. hemodialysis  patients) may cause spuriously elevated  iron results. \"      TIBC 07/26/2022 309  250 - 450 ug/dL Final    TRANSFERRIN SATURATION 07/26/2022 17 (A) >20 % Final     Treatment Summary has been discussed and given to patient: no    -------------------------------------------------------------------------------------------------------------------  Please call our office at [phone] if you have any  of the following symptoms:   Fever of 100.5 or greater  Chills  Shortness of breath  Swelling or pain in one leg    After office hours an answering service is available and will contact a provider for emergencies or if you are experiencing any of the above symptoms. Patient does express an interest in My Chart. My Chart log in information explained on the after visit summary printout at the [employer] desk. Rochekarin Mail, RN  Solid Tumor Nurse Navigator  [phone] cell phone   For Urgent Matters (after hours and weekends), please call Triage at [phone]. For Emergencies, please call [phone]. Mouth care  Check with your doctor before having any dental work done. Chemo can cause you to develop mouth sores or tenderness  Avoid spicy, acidic, salty, crunchy foods or beverages. Eat soft foods. Use soft toothbrush and brush after every meal   Use alcohol free mouthwash and toothpaste which can be irritating to mouth  Biotene mouthwash is a good option                Warm salt water and/or baking soda rinses several times a day, especially after eating to keep mouth clean.   1/2 tsp help prevent you from getting dehydrated. Normally need at least 64 ounces of fluids each day. Now you need more to replace all fluids lost with diarrhea. Avoid foods containing roughage and bulk (bran, whole grain cereals, dried fruit, and vegetables)  Avoid milk and milk products if they make diarrhea worse  Eat BRAT diet - bananas, rice, applesauce and toast  Eat foods high in potassium (bananas, oranges, baked potatoes, broccoli, mushrooms and apricots)   Eat small frequent snack size meals  Take anti-diarrhea medications as prescribed. Over the counter imodium/loperamide, take 1 - 2 tablets after each loose stool. No more than 8 tablets in a day  If imodium is not controlling diarrhea please call, there is a prescription medication that can be taken with imodium. Chemotherapy/Agent Information:     Diagnosis: non-small cell lung cancer    Goal of Therapy:  X_Curative         Name of Chemotherapy medications: Carboplatin/Paclitaxol    Number of Cycles Planned: 6-7 cycles      Length of Cycle:  weekly      Chemotherapy plan is subject to change at your provider's discretion    A copy of this plan has been discussed and given to patient, Sherrill [place] by Magdalena Liu NP.     Chemo Education:   Scheduled  8/9/2022    Consent for therapy:   Completed on 8/9/2022 no abrasions, no jaundice, no lesions, no pruritis, and no rashes.

## 2022-09-06 NOTE — PROGRESS NOTES
9/6/2022 - Pt seen by Dr. Kaushal Skelton for c1d1 Carbo/Taxol. Pt requesting to check thyroid function, due to being placed on methimazole in hospital months ago without any follow-up; will clarify and refer her to endocrinology, if needed. Monitor bruising and PC carefully. Labs reviewed; OK to proceed to infusion tomorrow. Pt starts radiation tomorrow. Encouraged to call with questions. Navigation will continue to follow.

## 2022-09-07 ENCOUNTER — HOSPITAL ENCOUNTER (OUTPATIENT)
Dept: INFUSION THERAPY | Age: 72
Discharge: HOME | End: 2022-09-07
Payer: MEDICARE

## 2022-09-07 ENCOUNTER — HOSPITAL ENCOUNTER (OUTPATIENT)
Dept: RADIATION ONCOLOGY | Age: 72
Setting detail: RECURRING SERIES
Discharge: HOME | End: 2022-09-10
Payer: MEDICARE

## 2022-09-07 ENCOUNTER — CLINICAL DOCUMENTATION (OUTPATIENT)
Dept: ONCOLOGY | Age: 72
End: 2022-09-07

## 2022-09-07 VITALS
HEART RATE: 76 BPM | TEMPERATURE: 97.6 F | BODY MASS INDEX: 21.6 KG/M2 | SYSTOLIC BLOOD PRESSURE: 144 MMHG | WEIGHT: 132.8 LBS | OXYGEN SATURATION: 96 % | DIASTOLIC BLOOD PRESSURE: 69 MMHG | RESPIRATION RATE: 20 BRPM

## 2022-09-07 DIAGNOSIS — C34.90 NON-SMALL CELL LUNG CANCER, UNSPECIFIED LATERALITY (HCC): Primary | ICD-10-CM

## 2022-09-07 LAB
HBV CORE AB SERPL QL IA: NEGATIVE
HBV SURFACE AB SERPL IA-ACNC: <3.1 MIU/ML
HBV SURFACE AG SER QL: NONREACTIVE
HCV AB SER QL: NONREACTIVE

## 2022-09-07 PROCEDURE — 6360000002 HC RX W HCPCS: Performed by: INTERNAL MEDICINE

## 2022-09-07 PROCEDURE — 2500000003 HC RX 250 WO HCPCS: Performed by: INTERNAL MEDICINE

## 2022-09-07 PROCEDURE — 96417 CHEMO IV INFUS EACH ADDL SEQ: CPT

## 2022-09-07 PROCEDURE — 2580000003 HC RX 258: Performed by: INTERNAL MEDICINE

## 2022-09-07 PROCEDURE — 96375 TX/PRO/DX INJ NEW DRUG ADDON: CPT

## 2022-09-07 PROCEDURE — 77386 HC NTSTY MODUL RAD TX DLVR CPLX: CPT

## 2022-09-07 PROCEDURE — A4216 STERILE WATER/SALINE, 10 ML: HCPCS | Performed by: INTERNAL MEDICINE

## 2022-09-07 PROCEDURE — 96413 CHEMO IV INFUSION 1 HR: CPT

## 2022-09-07 RX ORDER — MEPERIDINE HYDROCHLORIDE 25 MG/ML
12.5 INJECTION INTRAMUSCULAR; INTRAVENOUS; SUBCUTANEOUS PRN
Status: DISCONTINUED | OUTPATIENT
Start: 2022-09-07 | End: 2022-09-08 | Stop reason: HOSPADM

## 2022-09-07 RX ORDER — ONDANSETRON 2 MG/ML
8 INJECTION INTRAMUSCULAR; INTRAVENOUS ONCE
Status: COMPLETED | OUTPATIENT
Start: 2022-09-07 | End: 2022-09-07

## 2022-09-07 RX ORDER — SODIUM CHLORIDE 9 MG/ML
5-250 INJECTION, SOLUTION INTRAVENOUS PRN
Status: DISCONTINUED | OUTPATIENT
Start: 2022-09-07 | End: 2022-09-08 | Stop reason: HOSPADM

## 2022-09-07 RX ORDER — ALBUTEROL SULFATE 90 UG/1
4 AEROSOL, METERED RESPIRATORY (INHALATION) PRN
Status: DISCONTINUED | OUTPATIENT
Start: 2022-09-07 | End: 2022-09-08 | Stop reason: HOSPADM

## 2022-09-07 RX ORDER — ONDANSETRON 2 MG/ML
8 INJECTION INTRAMUSCULAR; INTRAVENOUS
Status: DISCONTINUED | OUTPATIENT
Start: 2022-09-07 | End: 2022-09-07 | Stop reason: HOSPADM

## 2022-09-07 RX ORDER — DIPHENHYDRAMINE HYDROCHLORIDE 50 MG/ML
50 INJECTION INTRAMUSCULAR; INTRAVENOUS ONCE
Status: COMPLETED | OUTPATIENT
Start: 2022-09-07 | End: 2022-09-07

## 2022-09-07 RX ORDER — SODIUM CHLORIDE 9 MG/ML
5-40 INJECTION INTRAVENOUS PRN
Status: DISCONTINUED | OUTPATIENT
Start: 2022-09-07 | End: 2022-09-08 | Stop reason: HOSPADM

## 2022-09-07 RX ORDER — DIPHENHYDRAMINE HYDROCHLORIDE 50 MG/ML
50 INJECTION INTRAMUSCULAR; INTRAVENOUS
Status: DISCONTINUED | OUTPATIENT
Start: 2022-09-07 | End: 2022-09-07 | Stop reason: HOSPADM

## 2022-09-07 RX ADMIN — DIPHENHYDRAMINE HYDROCHLORIDE 50 MG: 50 INJECTION INTRAMUSCULAR; INTRAVENOUS at 09:03

## 2022-09-07 RX ADMIN — DEXAMETHASONE SODIUM PHOSPHATE 12 MG: 4 INJECTION, SOLUTION INTRAMUSCULAR; INTRAVENOUS at 09:06

## 2022-09-07 RX ADMIN — SODIUM CHLORIDE 20 ML/HR: 9 INJECTION, SOLUTION INTRAVENOUS at 08:20

## 2022-09-07 RX ADMIN — ONDANSETRON 8 MG: 2 INJECTION INTRAMUSCULAR; INTRAVENOUS at 08:57

## 2022-09-07 RX ADMIN — CARBOPLATIN 146.6 MG: 10 INJECTION, SOLUTION INTRAVENOUS at 11:05

## 2022-09-07 RX ADMIN — SODIUM CHLORIDE, PRESERVATIVE FREE 10 ML: 5 INJECTION INTRAVENOUS at 12:20

## 2022-09-07 RX ADMIN — FAMOTIDINE 20 MG: 10 INJECTION, SOLUTION INTRAVENOUS at 09:00

## 2022-09-07 RX ADMIN — PACLITAXEL 72 MG: 6 INJECTION, SOLUTION, CONCENTRATE INTRAVENOUS at 10:00

## 2022-09-07 ASSESSMENT — PAIN DESCRIPTION - LOCATION: LOCATION: BACK

## 2022-09-07 ASSESSMENT — PAIN DESCRIPTION - FREQUENCY: FREQUENCY: CONTINUOUS

## 2022-09-07 ASSESSMENT — PAIN DESCRIPTION - ORIENTATION: ORIENTATION: LOWER

## 2022-09-07 ASSESSMENT — PAIN DESCRIPTION - PAIN TYPE: TYPE: CHRONIC PAIN

## 2022-09-07 ASSESSMENT — PAIN SCALES - GENERAL: PAINLEVEL_OUTOF10: 2

## 2022-09-07 ASSESSMENT — PAIN DESCRIPTION - DESCRIPTORS: DESCRIPTORS: ACHING

## 2022-09-07 ASSESSMENT — PAIN - FUNCTIONAL ASSESSMENT: PAIN_FUNCTIONAL_ASSESSMENT: PREVENTS OR INTERFERES SOME ACTIVE ACTIVITIES AND ADLS

## 2022-09-07 ASSESSMENT — PAIN DESCRIPTION - ONSET: ONSET: ON-GOING

## 2022-09-07 NOTE — PROGRESS NOTES
Clinical Social Work Note  Name: Derrick Peters    : 1950    MRN: [id number]    Date of Service: 2022    Type of Service: Health and Behavior Intervention     Length of Service: 30  minutes    Patient Diagnosis: No diagnosis found. Referral Source: Infusion RN    Reason for Visit: D1C1    Subject: Seen patient with  a family member. LISW-CP provided therapeutic reassurance. LISW-CP introduced self, presented Support Services Groups, Therapeutic Art Classes and others. LISW-CP discussed Distress by using NCCN Guidelines for Patients' Distress. Gave patient information on Cancer and Mental Health. Mini Mental Status Exam: Derrick Peters was dressed properly. No abnormal psychomotor movements observed. Intellectual functioning appeared to be intact. Insight was adequate. Judgment was adequate. Patient did not report suicidal ideations, intent or plans. Speech was coherent. Thought process was clear. Patient did not report homicidal ideations, intent or plans. Patient was oriented to self, place, time and situation. Protective Factors: Current care for physical and mental illness, adequate insight and judgment, family support, cultural and Oriental orthodox beliefs and values that support self-care. Next Steps: CARIN-CP gave contact information and encouraged pt to call should any needs arise. Pt verbalized understanding. CARIN-CP intends to follow up as needed. No flowsheet data found.         Electronically Signed By:  CARIN Diallo

## 2022-09-07 NOTE — PROGRESS NOTES
Arrived to the [facility]. Assessment completed, labs reviewed. Taxol/Carboplatin completed. Patient tolerated without problems. Stayed for 30 min observation post infusion with no problems noted  Any issues or concerns during appointment: None  Patient instructed to call provider with temperature of 100.4 or greater or nausea/vomiting/ diarrhea or pain not controlled by medications  Instructed to call Dr Mohinder Wood with any side effects or other concerns  Patient aware of next infusion appointment on 9/14/22(date) at 10 AM (time).   Discharged via W/C with family and oxygen

## 2022-09-08 ENCOUNTER — HOSPITAL ENCOUNTER (OUTPATIENT)
Dept: RADIATION ONCOLOGY | Age: 72
Setting detail: RECURRING SERIES
Discharge: HOME | End: 2022-09-11
Payer: MEDICARE

## 2022-09-08 ENCOUNTER — CLINICAL DOCUMENTATION (OUTPATIENT)
Dept: ONCOLOGY | Age: 72
End: 2022-09-08

## 2022-09-08 DIAGNOSIS — E05.90 HYPERTHYROIDISM: Primary | ICD-10-CM

## 2022-09-08 PROCEDURE — 77386 HC NTSTY MODUL RAD TX DLVR CPLX: CPT

## 2022-09-08 NOTE — PROGRESS NOTES
9/8/2022 - Put in referral to Endocrinology re: new dx: hyperthyroidism while in hospital, March 2022. Pt was placed on Rx: Methimazole. Referral for hospital follow-up and monitoring. Pt notified to expect a call from Endocrinologist office; pt VU. Encouraged to call with questions. Navigation will continue to follow.

## 2022-09-09 ENCOUNTER — HOSPITAL ENCOUNTER (OUTPATIENT)
Dept: RADIATION ONCOLOGY | Age: 72
Setting detail: RECURRING SERIES
Discharge: HOME | End: 2022-09-12
Payer: MEDICARE

## 2022-09-09 DIAGNOSIS — C34.90 NON-SMALL CELL LUNG CANCER, UNSPECIFIED LATERALITY (HCC): Primary | ICD-10-CM

## 2022-09-09 PROCEDURE — 77386 HC NTSTY MODUL RAD TX DLVR CPLX: CPT

## 2022-09-11 DIAGNOSIS — I25.10 CORONARY ARTERY DISEASE INVOLVING NATIVE CORONARY ARTERY OF NATIVE HEART WITHOUT ANGINA PECTORIS: Primary | ICD-10-CM

## 2022-09-11 DIAGNOSIS — I25.10 CAD S/P PERCUTANEOUS CORONARY ANGIOPLASTY: ICD-10-CM

## 2022-09-11 DIAGNOSIS — I65.29 CAROTID STENOSIS: ICD-10-CM

## 2022-09-11 DIAGNOSIS — Z98.61 CAD S/P PERCUTANEOUS CORONARY ANGIOPLASTY: ICD-10-CM

## 2022-09-12 ENCOUNTER — HOSPITAL ENCOUNTER (OUTPATIENT)
Dept: RADIATION ONCOLOGY | Age: 72
Setting detail: RECURRING SERIES
Discharge: HOME | End: 2022-09-15
Payer: MEDICARE

## 2022-09-12 PROCEDURE — 77386 HC NTSTY MODUL RAD TX DLVR CPLX: CPT

## 2022-09-12 RX ORDER — ATORVASTATIN CALCIUM 80 MG/1
TABLET, FILM COATED ORAL
Qty: 90 TABLET | Refills: 3 | OUTPATIENT
Start: 2022-09-12

## 2022-09-12 NOTE — PROGRESS NOTES
Patient: Jarrod Maldonado MRN: [id number]  SSN: [id number]    YOB: 1950  Age: 67 y.o. Sex: female      DIAGNOSIS:  Stage III NSCLC of the JHONY    TREATMENT SITE:  Lungs    DOSE and FRACTIONATION:  4 of 30 fractions; 800 of 6000cGy    INTERVAL HISTORY:  Jarrod Maldonado is a 67 y.o. female being treated for Stage III NSCLC of the JHONY. Week 1: No complaints. OBJECTIVE:  NAD  There were no vitals filed for this visit. Lab Results   Component Value Date/Time     09/06/2022 11:08 AM    K 4.1 09/06/2022 11:08 AM     09/06/2022 11:08 AM    CO2 28 09/06/2022 11:08 AM    BUN 13 09/06/2022 11:08 AM    GFRAA >60 09/06/2022 11:08 AM    MG 2.3 09/06/2022 11:08 AM    GLOB 3.9 09/06/2022 11:08 AM    ALT 23 09/06/2022 11:08 AM     Lab Results   Component Value Date/Time    WBC 6.7 09/06/2022 11:08 AM    HGB 11.5 09/06/2022 11:08 AM    HCT 37.8 09/06/2022 11:08 AM     09/06/2022 11:08 AM     ASSESSMENT and PLAN:  Jarrod Maldonado is tolerating radiation as anticipated for the current dose and fraction. We will continue on as planned with another treatment visit anticipated next week.       Estefani Munoz MD   September 12, 2022

## 2022-09-12 NOTE — TELEPHONE ENCOUNTER
I called the pt because of the atorvastatin refill request, she stated she does not need a refill at this time. I told her that she does need a lipid panel drawn, I will put in the order. Pt understood.

## 2022-09-13 ENCOUNTER — HOSPITAL ENCOUNTER (OUTPATIENT)
Dept: RADIATION ONCOLOGY | Age: 72
Setting detail: RECURRING SERIES
Discharge: HOME | End: 2022-09-16
Payer: MEDICARE

## 2022-09-13 PROCEDURE — 77386 HC NTSTY MODUL RAD TX DLVR CPLX: CPT

## 2022-09-13 PROCEDURE — 77336 RADIATION PHYSICS CONSULT: CPT

## 2022-09-14 ENCOUNTER — OFFICE VISIT (OUTPATIENT)
Dept: ONCOLOGY | Age: 72
End: 2022-09-14
Payer: MEDICARE

## 2022-09-14 ENCOUNTER — HOSPITAL ENCOUNTER (OUTPATIENT)
Dept: INFUSION THERAPY | Age: 72
Discharge: HOME | End: 2022-09-14
Payer: MEDICARE

## 2022-09-14 ENCOUNTER — CLINICAL DOCUMENTATION (OUTPATIENT)
Dept: ONCOLOGY | Age: 72
End: 2022-09-14

## 2022-09-14 ENCOUNTER — HOSPITAL ENCOUNTER (OUTPATIENT)
Dept: RADIATION ONCOLOGY | Age: 72
Setting detail: RECURRING SERIES
Discharge: HOME | End: 2022-09-17
Payer: MEDICARE

## 2022-09-14 VITALS
RESPIRATION RATE: 22 BRPM | WEIGHT: 133.5 LBS | DIASTOLIC BLOOD PRESSURE: 58 MMHG | OXYGEN SATURATION: 99 % | SYSTOLIC BLOOD PRESSURE: 139 MMHG | BODY MASS INDEX: 21.45 KG/M2 | HEART RATE: 68 BPM | HEIGHT: 66 IN | TEMPERATURE: 99.2 F

## 2022-09-14 DIAGNOSIS — C34.90 NON-SMALL CELL LUNG CANCER, UNSPECIFIED LATERALITY (HCC): ICD-10-CM

## 2022-09-14 DIAGNOSIS — C34.90 NON-SMALL CELL LUNG CANCER, UNSPECIFIED LATERALITY (HCC): Primary | ICD-10-CM

## 2022-09-14 DIAGNOSIS — C78.1 MEDIASTINAL METASTASIS (HCC): ICD-10-CM

## 2022-09-14 DIAGNOSIS — E05.90 HYPERTHYROIDISM: ICD-10-CM

## 2022-09-14 DIAGNOSIS — Z79.899 HIGH RISK MEDICATION USE: ICD-10-CM

## 2022-09-14 LAB
ALBUMIN SERPL-MCNC: 3.4 G/DL (ref 3.2–4.6)
ALBUMIN/GLOB SERPL: 0.9 {RATIO} (ref 1.2–3.5)
ALP SERPL-CCNC: 55 U/L (ref 50–136)
ALT SERPL-CCNC: 25 U/L (ref 12–65)
ANION GAP SERPL CALC-SCNC: 6 MMOL/L (ref 4–13)
AST SERPL-CCNC: 22 U/L (ref 15–37)
BASOPHILS # BLD: 0 K/UL (ref 0–0.2)
BASOPHILS NFR BLD: 1 % (ref 0–2)
BILIRUB SERPL-MCNC: 0.4 MG/DL (ref 0.2–1.1)
BUN SERPL-MCNC: 16 MG/DL (ref 8–23)
CALCIUM SERPL-MCNC: 8.8 MG/DL (ref 8.3–10.4)
CHLORIDE SERPL-SCNC: 101 MMOL/L (ref 101–110)
CO2 SERPL-SCNC: 28 MMOL/L (ref 21–32)
CREAT SERPL-MCNC: 0.8 MG/DL (ref 0.6–1)
DIFFERENTIAL METHOD BLD: ABNORMAL
EOSINOPHIL # BLD: 0 K/UL (ref 0–0.8)
EOSINOPHIL NFR BLD: 1 % (ref 0.5–7.8)
ERYTHROCYTE [DISTWIDTH] IN BLOOD BY AUTOMATED COUNT: 17.2 % (ref 11.9–14.6)
GLOBULIN SER CALC-MCNC: 3.7 G/DL (ref 2.3–3.5)
GLUCOSE SERPL-MCNC: 96 MG/DL (ref 65–100)
HCT VFR BLD AUTO: 33.5 % (ref 35.8–46.3)
HGB BLD-MCNC: 10.7 G/DL (ref 11.7–15.4)
IMM GRANULOCYTES # BLD AUTO: 0.1 K/UL (ref 0–0.5)
IMM GRANULOCYTES NFR BLD AUTO: 1 % (ref 0–5)
LYMPHOCYTES # BLD: 0.6 K/UL (ref 0.5–4.6)
LYMPHOCYTES NFR BLD: 10 % (ref 13–44)
MAGNESIUM SERPL-MCNC: 2.2 MG/DL (ref 1.8–2.4)
MCH RBC QN AUTO: 29.3 PG (ref 26.1–32.9)
MCHC RBC AUTO-ENTMCNC: 31.9 G/DL (ref 31.4–35)
MCV RBC AUTO: 91.8 FL (ref 79.6–97.8)
MONOCYTES # BLD: 0.4 K/UL (ref 0.1–1.3)
MONOCYTES NFR BLD: 7 % (ref 4–12)
NEUTS SEG # BLD: 4.3 K/UL (ref 1.7–8.2)
NEUTS SEG NFR BLD: 80 % (ref 43–78)
NRBC # BLD: 0 K/UL (ref 0–0.2)
PLATELET # BLD AUTO: 238 K/UL (ref 150–450)
PMV BLD AUTO: 9.1 FL (ref 9.4–12.3)
POTASSIUM SERPL-SCNC: 3.7 MMOL/L (ref 3.5–5.1)
PROT SERPL-MCNC: 7.1 G/DL (ref 6.3–8.2)
RBC # BLD AUTO: 3.65 M/UL (ref 4.05–5.2)
SODIUM SERPL-SCNC: 135 MMOL/L (ref 136–145)
WBC # BLD AUTO: 5.3 K/UL (ref 4.3–11.1)

## 2022-09-14 PROCEDURE — 2580000003 HC RX 258: Performed by: INTERNAL MEDICINE

## 2022-09-14 PROCEDURE — 6360000002 HC RX W HCPCS: Performed by: INTERNAL MEDICINE

## 2022-09-14 PROCEDURE — 99215 OFFICE O/P EST HI 40 MIN: CPT | Performed by: INTERNAL MEDICINE

## 2022-09-14 PROCEDURE — 96417 CHEMO IV INFUS EACH ADDL SEQ: CPT

## 2022-09-14 PROCEDURE — 80053 COMPREHEN METABOLIC PANEL: CPT

## 2022-09-14 PROCEDURE — 96413 CHEMO IV INFUSION 1 HR: CPT

## 2022-09-14 PROCEDURE — A4216 STERILE WATER/SALINE, 10 ML: HCPCS | Performed by: INTERNAL MEDICINE

## 2022-09-14 PROCEDURE — 83735 ASSAY OF MAGNESIUM: CPT

## 2022-09-14 PROCEDURE — 96375 TX/PRO/DX INJ NEW DRUG ADDON: CPT

## 2022-09-14 PROCEDURE — 1123F ACP DISCUSS/DSCN MKR DOCD: CPT | Performed by: INTERNAL MEDICINE

## 2022-09-14 PROCEDURE — 2500000003 HC RX 250 WO HCPCS: Performed by: INTERNAL MEDICINE

## 2022-09-14 PROCEDURE — 85025 COMPLETE CBC W/AUTO DIFF WBC: CPT

## 2022-09-14 PROCEDURE — 36591 DRAW BLOOD OFF VENOUS DEVICE: CPT

## 2022-09-14 PROCEDURE — 77386 HC NTSTY MODUL RAD TX DLVR CPLX: CPT

## 2022-09-14 RX ORDER — SODIUM CHLORIDE 0.9 % (FLUSH) 0.9 %
10 SYRINGE (ML) INJECTION PRN
Status: DISCONTINUED | OUTPATIENT
Start: 2022-09-14 | End: 2022-09-15 | Stop reason: HOSPADM

## 2022-09-14 RX ORDER — DIPHENHYDRAMINE HYDROCHLORIDE 50 MG/ML
50 INJECTION INTRAMUSCULAR; INTRAVENOUS ONCE
Status: COMPLETED | OUTPATIENT
Start: 2022-09-14 | End: 2022-09-14

## 2022-09-14 RX ORDER — SODIUM CHLORIDE 9 MG/ML
5-250 INJECTION, SOLUTION INTRAVENOUS PRN
Status: DISCONTINUED | OUTPATIENT
Start: 2022-09-14 | End: 2022-09-15 | Stop reason: HOSPADM

## 2022-09-14 RX ORDER — ONDANSETRON 2 MG/ML
8 INJECTION INTRAMUSCULAR; INTRAVENOUS ONCE
Status: COMPLETED | OUTPATIENT
Start: 2022-09-14 | End: 2022-09-14

## 2022-09-14 RX ORDER — PREDNISONE 1 MG/1
TABLET ORAL
COMMUNITY
Start: 2022-08-01

## 2022-09-14 RX ORDER — SODIUM CHLORIDE 0.9 % (FLUSH) 0.9 %
5-40 SYRINGE (ML) INJECTION PRN
Status: DISCONTINUED | OUTPATIENT
Start: 2022-09-14 | End: 2022-09-15 | Stop reason: HOSPADM

## 2022-09-14 RX ADMIN — FAMOTIDINE 20 MG: 10 INJECTION, SOLUTION INTRAVENOUS at 11:00

## 2022-09-14 RX ADMIN — SODIUM CHLORIDE, PRESERVATIVE FREE 10 ML: 5 INJECTION INTRAVENOUS at 14:02

## 2022-09-14 RX ADMIN — DEXAMETHASONE SODIUM PHOSPHATE 12 MG: 4 INJECTION, SOLUTION INTRAMUSCULAR; INTRAVENOUS at 11:15

## 2022-09-14 RX ADMIN — PACLITAXEL 72 MG: 6 INJECTION, SOLUTION, CONCENTRATE INTRAVENOUS at 12:00

## 2022-09-14 RX ADMIN — DIPHENHYDRAMINE HYDROCHLORIDE 50 MG: 50 INJECTION INTRAMUSCULAR; INTRAVENOUS at 10:58

## 2022-09-14 RX ADMIN — SODIUM CHLORIDE 100 ML/HR: 9 INJECTION, SOLUTION INTRAVENOUS at 10:35

## 2022-09-14 RX ADMIN — SODIUM CHLORIDE, PRESERVATIVE FREE 10 ML: 5 INJECTION INTRAVENOUS at 10:45

## 2022-09-14 RX ADMIN — CARBOPLATIN 169.8 MG: 10 INJECTION, SOLUTION INTRAVENOUS at 13:30

## 2022-09-14 RX ADMIN — ONDANSETRON 8 MG: 2 INJECTION INTRAMUSCULAR; INTRAVENOUS at 11:14

## 2022-09-14 RX ADMIN — SODIUM CHLORIDE, PRESERVATIVE FREE 10 ML: 5 INJECTION INTRAVENOUS at 08:15

## 2022-09-14 ASSESSMENT — PATIENT HEALTH QUESTIONNAIRE - PHQ9
SUM OF ALL RESPONSES TO PHQ9 QUESTIONS 1 & 2: 0
SUM OF ALL RESPONSES TO PHQ QUESTIONS 1-9: 0
SUM OF ALL RESPONSES TO PHQ QUESTIONS 1-9: 0
2. FEELING DOWN, DEPRESSED OR HOPELESS: 0
1. LITTLE INTEREST OR PLEASURE IN DOING THINGS: 0
SUM OF ALL RESPONSES TO PHQ QUESTIONS 1-9: 0
SUM OF ALL RESPONSES TO PHQ QUESTIONS 1-9: 0

## 2022-09-14 NOTE — PROGRESS NOTES
Patient arrived to port lab for port access and lab draw. Port accessed and labs drawn per protocol   Liborio Toney remains accessed. Patient discharged from port lab via wheelchair.

## 2022-09-14 NOTE — PROGRESS NOTES
Arrived to the [facility]. Assessment completed, labs reviewed. Taxol/Carboplatin completed. Patient tolerated without problems. Any issues or concerns during appointment: None  Patient instructed to call provider with temperature of 100.4 or greater or nausea/vomiting/ diarrhea or pain not controlled by medications  Instructed to call Dr Kelly Martin with any side effects or other concerns  Patient aware of next infusion appointment on 9/21/22 @7530. Discharged via [address].

## 2022-09-14 NOTE — PROGRESS NOTES
[facility] Hematology & Oncology: Office Visit Progress Note    Chief Complaint:    Non-small cell lung cancer stage III    History of Present Illness:  Reason for Referral:  Non-small cell lung cancer     Referring Provider:  Dr. Eunice Peace, [facility] Critical Care     Primary Care Provider:  Dr. Azul Dunaway, [facility])     Family History of Cancer/Hematologic Disorders:  No documented family history     Presenting Symptoms:   Presented to [facility] ED with COPD exacerbation     Ms. Eduardo Gaitan is a 67 y.o.  female with a medical history of COPD, asthma, oxygen dependent, CAD, closed right hip and femur fracture (2/22), subdural hematoma (3/22), MI, and thyroid disease. Surgical history includes bilateral cataract removal, right hip surgery, and right carotid endarterectomy. She is a former smoker of 50 years but quit in 2017. Denies alcohol use. On 6/9/22 patient presented to the [facility] ED with increased shortness of breath over several days. Admitted with exacerbation of COPD. CXR showed unchanged consolidation of left lower lobe lung. On 6/11 CT Chest PE protocol was performed which revealed no PE but a increase in the left lower lobe lung mass. She was discharged home on 6/12 with instruction to follow up with pulmonary within 1 week. On 6/24/22 she had a PET scan which showed known lung mass and left hilar and subcarinal mediastinal activity as well. On 6/28/22 she was seen in follow up with pulmonary. PET imaging reviewed. Recommended EBUS. Procedure was performed on 7/20/22. Pathology showed malignant cells 11L lymph node. Referral to medical and radiation oncology for evaluation and treatment recommendations.       MRI Brain scheduled on 8/2 and radiation oncology consult on 7/29.       6/9/22 [facility] ED CXR  FINDINGS: The lungs are hyperexpanded, consistent with emphysematous changes on the prior CT chest. Areas of nodular not felt to be related to the patient's lung cancer. However, a dermal neoplasm or focal  inflammatory process is not excluded given the appearance. This should be further assessed with direct visualization. No suspicious activity is otherwise seen below the diaphragms. Activity is seen adjacent to the right femoral neck. However, this appears to correspond to heterotopic bone formation from hip  surgery at this level without aggressive features suggested therefore is not worrisome. No focal hepatic activity is seen. No contour deforming, or hypermetabolic adrenal lesion is seen. Advanced atherosclerotic calcification is seen of the abdominal aorta. IMPRESSION:  1. Heterogeneous left lower lobe mass demonstrating irregular spiculated components and measuring 4.4 cm x 2.3 cm in greatest transverse dimensions and with a maximum SUV of 11.2 g/mL. Malignancy is not excluded given the appearance. Further evaluation as clinically indicated is recommended. 2. Additional left hilar and subcarinal mediastinal activity concerning for diego metastases. Additional mild activity is seen within a prevascular mediastinal lymph node an additional left hilar lymph nodes for which additional early diego metastases are not excluded. 3. Focal activity associated with focal skin thickening in the most medial, proximal left thigh seen on pet/CT image 257. This is unlikely to be related to potential lung cancer although could represent an additional dermal neoplasm or focal abnormal inflammatory process otherwise. This should be clinically  evaluated with direct visualization. This has been marked on image to assist with clinical correlation. 4. Mild activity associated with a cystic abnormality in the medial right upper lobe. Although the degree of activity is suspicious (maximum SUV of 4.8 g/mL) the CT features are more consistent with a potential inflammatory process. Attention on follow-up studies is recommended. 7/20/22 [facility] Pathology EBUS  DIAGNOSIS  11L Lymph Node, Fine Needle Aspiration:      MALIGNANT CELLS PRESENT. Cell block: Malignant cells present. Immunohistochemical findings will follow in an addendum. Notes from Referring Provider:  None     Other Pertinent Information:  Covid vaccination - 1/28/21, 2/18/21, and 9/25/21     Radiation oncology consult with Dr. Ivette Sarmiento scheduled for 7/29/22 at 1 pm.     MRI Brain is scheduled for 8/2/22 at 8 am.         Review of Systems:  Constitutional Denies fever or chills. Denies weight loss or appetite changes. Denies fatigue. Denies anorexia. HEENT Denies trauma, bluring vision, hearing loss, ear pain, nosebleeds, sore throat, neck pain and ear discharge. Skin Denies lesions or rashes. Lungs Denies shortness of breath, cough, sputum production or hemoptysis. Cardiovascular Denies chest pain, palpitations, orthopnea, claudication and leg swelling. Gastrointestinal Denies nausea, vomiting, bowel changes. Denies bloody or black stools. Denies abdominal pain.  Denies dysuria, frequency or hesitancy of urination   Neuro Denies headaches, visual changes or ataxia. Denies dizziness, tingling, tremors, sensory change, speech change, focal weakness and headaches. Hematology Denies nasal/gum bleeding, denies easy bruise   Endo Denies heat/cold intolerance, denies diabetes. MSK Denies back pain, swollen legs, myalgias and falls. Psychiatric/Behavioral Denies depression and substance abuse. The patient is not nervous/anxious. Allergies   Allergen Reactions    Promethazine Nausea And Vomiting and Other (See Comments)     Severe vomiting      Past Medical History:   Diagnosis Date    Acute respiratory failure with hypoxia (HCC) 08/22/2013    Asthma     CAD (coronary artery disease)     Followed by [facility].     Chest pain 05/31/2019    pt denies any reent CP or increase SOB    Closed right hip fracture ([address] 02/04/2022    COPD with acute lower respiratory infection ([facility] 02/10/2022    severe COPD with centrilobular emphysema, on nocturnal O2.     EKG, abnormal 02/15/2022    Femur fracture, right ([address] 2022    Former cigarette smoker     H/O heart artery stent     several stents- last stents placed 2022    History of MI (myocardial infarction)     Lung mass     Multiple closed anterior-posterior compression fractures of pelvis ([address] 2022    Other fracture of right femur, initial encounter for closed fracture ([address] 2022    Oxygen dependent     2L NC continuous    Post-traumatic subdural hematoma ([address] 03/15/2022    SDH (subdural hematoma) ([facility] [address] 2022    hx of    Thyroid disease      Past Surgical History:   Procedure Laterality Date    BRONCHOSCOPY N/A 2022    BRONCHOSCOPY ENDOBRONCHIAL ULTRASOUND performed by Mata Jain MD at [facility] ENDOSCOPY    CAROTID ENDARTERECTOMY Right     CATARACT REMOVAL Bilateral     HIP SURGERY Right     IR PORT PLACEMENT EQUAL OR GREATER THAN 5 YEARS  2022    IR PORT PLACEMENT EQUAL OR GREATER THAN 5 YEARS 2022 [facility] SPECIALS    WISDOM TOOTH EXTRACTION      as a teenagers     Family History   Adopted: Yes   Problem Relation Age of Onset    No Known Problems Mother     No Known Problems Father      Social History     Socioeconomic History    Marital status:      Spouse name: Not on file    Number of children: Not on file    Years of education: Not on file    Highest education level: Not on file   Occupational History    Not on file   Tobacco Use    Smoking status: Former     Packs/day: 0.50     Years: 50.00     Pack years: 25.00     Types: Cigarettes     Quit date:      Years since quittin.7    Smokeless tobacco: Never   Substance and Sexual Activity    Alcohol use: Not Currently    Drug use: Not on file    Sexual activity: Not on file   Other Topics Concern    Not on file   Social History Narrative    Not on file     Social Determinants of Health     Financial Resource Strain: Not on file   Food Insecurity: Not on file   Transportation Needs: Not on file   Physical Activity: Not on file   Stress: Not on file   Social Connections: Not on file   Intimate Partner Violence: Not on file   Housing Stability: Not on file     Current Outpatient Medications   Medication Sig Dispense Refill    predniSONE (DELTASONE) 5 MG tablet TAKE 1 TABLET BY MOUTH TWICE DAILY      albuterol (PROVENTIL) (2.5 MG/3ML) 0.083% nebulizer solution TAKE 3 ML BY NEBULIZATION EVERY 6 HOURS AS NEEDED FOR SHORTNESS OF BREATH      tiotropium (SPIRIVA RESPIMAT) 2.5 MCG/ACT AERS inhaler Inhale 2 puffs into the lungs in the morning. 1 each 11    budesonide-formoterol (SYMBICORT) 160-4.5 MCG/ACT AERO Inhale 2 puffs into the lungs in the morning and 2 puffs before bedtime. 10.2 g 11    OXYGEN Inhale into the lungs 2 LPM      traMADol (ULTRAM) 50 MG tablet Take 1 tablet by mouth in the morning, at noon, and at bedtime for 90 days. Z51.5 Palliative Care 90 tablet 2    lidocaine-prilocaine (EMLA) 2.5-2.5 % cream Apply topically as needed.  30 g 1    ondansetron (ZOFRAN ODT) 4 MG disintegrating tablet Take 1 tablet by mouth every 8 hours as needed for Nausea or Vomiting 90 tablet 1    prochlorperazine (COMPAZINE) 10 MG tablet Take 1 tablet by mouth every 6 hours as needed (chemo-induced nausea) 90 tablet 2    Pseudoephedrine-guaiFENesin (MUCINEX D PO) Take by mouth in the morning and at bedtime      torsemide (DEMADEX) 10 MG tablet Take 10 mg by mouth daily      acetaminophen (TYLENOL) 325 MG tablet Take by mouth every 4 hours as needed      aspirin 81 MG chewable tablet Take 81 mg by mouth daily      atorvastatin (LIPITOR) 80 MG tablet Take 80 mg by mouth at bedtime TAKE 1 TABLET BY MOUTH DAILY      cetirizine (ZYRTEC) 10 MG tablet Take 10 mg by mouth daily as needed      clopidogrel (PLAVIX) 75 MG tablet Take 75 mg by mouth daily      escitalopram (LEXAPRO) 10 MG tablet Take 10 mg by mouth daily      ezetimibe (ZETIA) 10 MG tablet Take 10 mg by mouth at bedtime      methIMAzole (TAPAZOLE) 10 MG tablet Take 10 mg by mouth daily      metoprolol succinate (TOPROL XL) 100 MG extended release tablet Take 100 mg by mouth daily      nitroGLYCERIN (NITROSTAT) 0.4 MG SL tablet Place 0.4 mg under the tongue 3 times daily as needed      pantoprazole (PROTONIX) 40 MG tablet Take 40 mg by mouth 2 times daily      Roflumilast (DALIRESP) 500 MCG tablet Take 500 mcg by mouth nightly      predniSONE (DELTASONE) 10 MG tablet Take 10 mg by mouth daily (with breakfast) (Patient not taking: Reported on 9/14/2022)      butalbital-acetaminophen-caffeine (FIORICET, ESGIC) -40 MG per tablet Take 1 tablet by mouth every 6 hours as needed (Patient not taking: Reported on 9/14/2022)       No current facility-administered medications for this visit. Facility-Administered Medications Ordered in Other Visits   Medication Dose Route Frequency Provider Last Rate Last Admin    sodium chloride flush 0.9 % injection 10 mL  10 mL IntraVENous PRN Judie Poole MD   10 mL at 09/14/22 0815    0.9 % sodium chloride infusion  5-250 mL/hr IntraVENous PRN Judie Poole  mL/hr at 09/14/22 1035 100 mL/hr at 09/14/22 1035    PACLitaxel (TAXOL) 72 mg in sodium chloride 0.9 % 100 mL chemo infusion  45 mg/m2 (Treatment Plan Recorded) IntraVENous Once Judie Poole MD   Stopped at 09/14/22 1300    CARBOplatin (PARAPLATIN) 169.8 mg in sodium chloride 0.9 % 250 mL chemo IVPB  169.8 mg IntraVENous Once Judie Poole MD        sodium chloride flush 0.9 % injection 5-40 mL  5-40 mL IntraVENous PRN Judie Poole MD   10 mL at 09/14/22 1045       OBJECTIVE:  BP (!) 139/58   Pulse 68   Temp 99.2 °F (37.3 °C) (Oral)   Resp 22   Ht 5' 5.75\" (1.67 m)   Wt 133 lb 8 oz (60.6 kg)   SpO2 99%   BMI 21.71 kg/m²     Physical Exam:  Constitutional: Oriented to person, place, and time. Well-developed and well-nourished.     HEENT: Normocephalic and atraumatic. Oropharynx is clear and moist.   Conjunctivae and EOM are normal. Pupils are equal, round, and reactive to light. No scleral icterus. Neck supple. No JVD present. No tracheal deviation present. No thyromegaly present. Lymph node No palpable submandibular, cervical, supraclavicular, axillary and inguinal lymph nodes. Skin Warm and dry. No bruising and no rash noted. No erythema. No pallor. Respiratory Effort normal and breath sounds normal.  No respiratory distress. No wheezes. No rales. No tenderness. CVS Normal rate, regular rhythm and normal heart sounds. Exam reveals no gallop, no friction and no rub. No murmur heard. Abdomen Soft. Bowel sounds are normal. Exhibits no distension. There is no tenderness. There is no rebound and no guarding. Neuro Normal reflexes. No cranial nerve deficit. Exhibits normal muscle tone, 5 of 5 strength of all extremities. MSK Normal range of motion in general.  No edema and no tenderness.    Psych Normal mood, affect, behavior, judgment and thought content      Labs:  Recent Results (from the past 24 hour(s))   Magnesium    Collection Time: 09/14/22  8:09 AM   Result Value Ref Range    Magnesium 2.2 1.8 - 2.4 mg/dL   Comprehensive Metabolic Panel    Collection Time: 09/14/22  8:09 AM   Result Value Ref Range    Sodium 135 (L) 136 - 145 mmol/L    Potassium 3.7 3.5 - 5.1 mmol/L    Chloride 101 101 - 110 mmol/L    CO2 28 21 - 32 mmol/L    Anion Gap 6 4 - 13 mmol/L    Glucose 96 65 - 100 mg/dL    BUN 16 8 - 23 MG/DL    Creatinine 0.80 0.6 - 1.0 MG/DL    GFR African American >60 >60 ml/min/1.73m2    GFR Non- >60 >60 ml/min/1.73m2    Calcium 8.8 8.3 - 10.4 MG/DL    Total Bilirubin 0.4 0.2 - 1.1 MG/DL    ALT 25 12 - 65 U/L    AST 22 15 - 37 U/L    Alk Phosphatase 55 50 - 136 U/L    Total Protein 7.1 6.3 - 8.2 g/dL    Albumin 3.4 3.2 - 4.6 g/dL    Globulin 3.7 (H) 2.3 - 3.5 g/dL    Albumin/Globulin Ratio 0.9 (L) 1.2 - 3.5     CBC with Auto Differential    Collection Time: 09/14/22  8:09 AM   Result Value Ref Range    WBC 5.3 4.3 - 11.1 K/uL    RBC 3.65 (L) 4.05 - 5.2 M/uL    Hemoglobin 10.7 (L) 11.7 - 15.4 g/dL    Hematocrit 33.5 (L) 35.8 - 46.3 %    MCV 91.8 79.6 - 97.8 FL    MCH 29.3 26.1 - 32.9 PG    MCHC 31.9 31.4 - 35.0 g/dL    RDW 17.2 (H) 11.9 - 14.6 %    Platelets 946 449 - 047 K/uL    MPV 9.1 (L) 9.4 - 12.3 FL    nRBC 0.00 0.0 - 0.2 K/uL    Seg Neutrophils 80 (H) 43 - 78 %    Lymphocytes 10 (L) 13 - 44 %    Monocytes 7 4.0 - 12.0 %    Eosinophils % 1 0.5 - 7.8 %    Basophils 1 0.0 - 2.0 %    Immature Granulocytes 1 0.0 - 5.0 %    Segs Absolute 4.3 1.7 - 8.2 K/UL    Absolute Lymph # 0.6 0.5 - 4.6 K/UL    Absolute Mono # 0.4 0.1 - 1.3 K/UL    Absolute Eos # 0.0 0.0 - 0.8 K/UL    Basophils Absolute 0.0 0.0 - 0.2 K/UL    Absolute Immature Granulocyte 0.1 0.0 - 0.5 K/UL    Differential Type AUTOMATED         Imaging:  No results found for this or any previous visit. ASSESSMENT/PLAN:   Diagnosis Orders   1. Non-small cell lung cancer, unspecified laterality ([address]        2. Hyperthyroidism        3. High risk medication use        4. Mediastinal metastasis ([address]              67 y.o. F consulted for lung adenocarcinoma presented to  on 7/26/2022 with . She was a longtime smoker quitted 5 years ago, COPD on home oxygen, recently found left lower lobe 4.4 cm mass and PET showed avid left hilar and subcarinal lymphadenopathy, station 7 lymphadenopathy not accessible on EBUS but biopsy of station 11 L lymph node showed lung adenocarcinoma.   We discussed that this was consistent with stage III lung cancer, discussed potential curative intent therapy with concurrent chemoradiation followed by immunotherapy, all questions answered and patient is interested, we will arrange port placement and radiation oncology evaluation, discussed toxicities and management, patient takes Plavix for severe CAD with multiple coronary stents, no absolute contraindication but would need careful management, return on 9/6/2022 and ready to proceed with cycle 1, monitor bruising and platelet carefully, requested to check thyroid function and reportedly being placed on methimazole for supposed hyperthyroidism when she was hospitalized months ago without any follow-ups, check TSH actually elevated and refer to endocrinology to clarify the management, clarified questions regarding seafood, proceed to week to chemoradiation, follow next week but call as needed. All questions are answered to their satisfaction. They will call for further questions and concerns. ECOG PERFORMANCE STATUS - 1- Restricted in physically strenuous activity but ambulatory and able to carry out work of a light or sedentary nature such as light house work, office work. Pain - 0 - No pain/10. None/Minimal pain - not affecting QOL     Fatigue - No flowsheet data found. Distress - No flowsheet data found. Elements of this note have been dictated via voice recognition software. Text and phrases may be limited by the accuracy and autoconversion of the software. The chart has been reviewed, but errors may still be present. Luci Brunson M.D.   [address], [address] [place] [address]  Office : [phone]  Fax : [phone]

## 2022-09-14 NOTE — PATIENT INSTRUCTIONS
Patient Instructions from Today's Visit    Reason for Visit:  Follow-up visit for c2 Carbo/Taxol    Diagnosis Information:  [email or web address] net/about-us/asco-answers-patient-education-materials/shfi-imsuljl-louj-sheets    Plan: Your neuropathy is stable; will continue to monitor this. You have tolerated treatment well for your first cycle. Will send your application to [facility], [facility] [address] for assistance with wigs, scarves and protein shakes. Will call endocrinologist for scheduling an appointment to check your thyroid function. Labs reviewed; OK to proceed to infusion. Follow Up:   In 1 week    Recent Lab Results:  Hospital Outpatient Visit on 09/14/2022   Component Date Value Ref Range Status    WBC 09/14/2022 5.3  4.3 - 11.1 K/uL Final    RBC 09/14/2022 3.65 (A) 4.05 - 5.2 M/uL Final    Hemoglobin 09/14/2022 10.7 (A) 11.7 - 15.4 g/dL Final    Hematocrit 09/14/2022 33.5 (A) 35.8 - 46.3 % Final    MCV 09/14/2022 91.8  79.6 - 97.8 FL Final    MCH 09/14/2022 29.3  26.1 - 32.9 PG Final    MCHC 09/14/2022 31.9  31.4 - 35.0 g/dL Final    RDW 09/14/2022 17.2 (A) 11.9 - 14.6 % Final    Platelets 55/28/9647 238  150 - 450 K/uL Final    MPV 09/14/2022 9.1 (A) 9.4 - 12.3 FL Final    nRBC 09/14/2022 0.00  0.0 - 0.2 K/uL Final    **Note: Absolute NRBC parameter is now reported with Hemogram**    Seg Neutrophils 09/14/2022 80 (A) 43 - 78 % Final    Lymphocytes 09/14/2022 10 (A) 13 - 44 % Final    Monocytes 09/14/2022 7  4.0 - 12.0 % Final    Eosinophils % 09/14/2022 1  0.5 - 7.8 % Final    Basophils 09/14/2022 1  0.0 - 2.0 % Final    Immature Granulocytes 09/14/2022 1  0.0 - 5.0 % Final    Segs Absolute 09/14/2022 4.3  1.7 - 8.2 K/UL Final    Absolute Lymph # 09/14/2022 0.6  0.5 - 4.6 K/UL Final    Absolute Mono # 09/14/2022 0.4  0.1 - 1.3 K/UL Final    Absolute Eos # 09/14/2022 0.0  0.0 - 0.8 K/UL Final    Basophils Absolute 09/14/2022 0.0  0.0 - 0.2 K/UL Final    Absolute Immature Granulocyte 09/14/2022 0.1 0.0 - 0.5 / Final    Differential Type 09/14/2022 AUTOMATED    Final     Treatment Summary has been discussed and given to patient: no    -------------------------------------------------------------------------------------------------------------------  Please call our office at [phone] if you have any  of the following symptoms:   Fever of 100.5 or greater  Chills  Shortness of breath  Swelling or pain in one leg    After office hours an answering service is available and will contact a provider for emergencies or if you are experiencing any of the above symptoms. Patient does express an interest in My Chart. My Chart log in information explained on the after visit summary printout at the . [facility] [address] desk. Ricky Velasquez, RN  Solid Tumor Nurse Navigator  [phone] cell phone   For Urgent Matters (after hours and weekends), please call Triage at [phone]. For Emergencies, please call 911.

## 2022-09-15 ENCOUNTER — HOSPITAL ENCOUNTER (OUTPATIENT)
Dept: RADIATION ONCOLOGY | Age: 72
Setting detail: RECURRING SERIES
Discharge: HOME | End: 2022-09-18
Payer: MEDICARE

## 2022-09-15 PROCEDURE — 77386 HC NTSTY MODUL RAD TX DLVR CPLX: CPT

## 2022-09-16 ENCOUNTER — HOSPITAL ENCOUNTER (OUTPATIENT)
Dept: RADIATION ONCOLOGY | Age: 72
Setting detail: RECURRING SERIES
Discharge: HOME | End: 2022-09-19
Payer: MEDICARE

## 2022-09-16 PROCEDURE — 77386 HC NTSTY MODUL RAD TX DLVR CPLX: CPT

## 2022-09-19 ENCOUNTER — HOSPITAL ENCOUNTER (OUTPATIENT)
Dept: RADIATION ONCOLOGY | Age: 72
Setting detail: RECURRING SERIES
Discharge: HOME | End: 2022-09-22
Payer: MEDICARE

## 2022-09-19 PROCEDURE — 77386 HC NTSTY MODUL RAD TX DLVR CPLX: CPT

## 2022-09-19 NOTE — PROGRESS NOTES
Patient: Duncan Dickinson MRN: [id number]  SSN: [id number]    YOB: 1950  Age: 67 y.o. Sex: female      DIAGNOSIS:  Stage III NSCLC of the JHONY    TREATMENT SITE:  Lungs    DOSE and FRACTIONATION:  9 of 30 fractions; 1800 of 6000cGy    INTERVAL HISTORY:  Duncan Dickinson is a 67 y.o. female being treated for Stage III NSCLC of the JHONY. Week 1: No complaints. Week 2: Mild esophagitis. OBJECTIVE:  NAD  There were no vitals filed for this visit. Lab Results   Component Value Date/Time     09/14/2022 08:09 AM    K 3.7 09/14/2022 08:09 AM     09/14/2022 08:09 AM    CO2 28 09/14/2022 08:09 AM    BUN 16 09/14/2022 08:09 AM    GFRAA >60 09/14/2022 08:09 AM    MG 2.2 09/14/2022 08:09 AM    GLOB 3.7 09/14/2022 08:09 AM    ALT 25 09/14/2022 08:09 AM     Lab Results   Component Value Date/Time    WBC 5.3 09/14/2022 08:09 AM    HGB 10.7 09/14/2022 08:09 AM    HCT 33.5 09/14/2022 08:09 AM     09/14/2022 08:09 AM     ASSESSMENT and PLAN:  Duncan Dickinson is tolerating radiation as anticipated for the current dose and fraction. We will continue on as planned with another treatment visit anticipated next week. Will add MMW for esophagitis.     Hellen Subramanian MD   September 19, 2022

## 2022-09-20 ENCOUNTER — HOSPITAL ENCOUNTER (OUTPATIENT)
Dept: RADIATION ONCOLOGY | Age: 72
Setting detail: RECURRING SERIES
Discharge: HOME | End: 2022-09-23
Payer: MEDICARE

## 2022-09-20 PROCEDURE — 77386 HC NTSTY MODUL RAD TX DLVR CPLX: CPT

## 2022-09-20 PROCEDURE — 77336 RADIATION PHYSICS CONSULT: CPT

## 2022-09-21 ENCOUNTER — OFFICE VISIT (OUTPATIENT)
Dept: ONCOLOGY | Age: 72
End: 2022-09-21
Payer: MEDICARE

## 2022-09-21 ENCOUNTER — HOSPITAL ENCOUNTER (OUTPATIENT)
Dept: INFUSION THERAPY | Age: 72
Discharge: HOME | End: 2022-09-21
Payer: MEDICARE

## 2022-09-21 ENCOUNTER — HOSPITAL ENCOUNTER (OUTPATIENT)
Dept: RADIATION ONCOLOGY | Age: 72
Setting detail: RECURRING SERIES
Discharge: HOME | End: 2022-09-24
Payer: MEDICARE

## 2022-09-21 VITALS
BODY MASS INDEX: 21.65 KG/M2 | SYSTOLIC BLOOD PRESSURE: 130 MMHG | HEIGHT: 66 IN | OXYGEN SATURATION: 100 % | WEIGHT: 134.7 LBS | HEART RATE: 70 BPM | RESPIRATION RATE: 21 BRPM | DIASTOLIC BLOOD PRESSURE: 62 MMHG

## 2022-09-21 VITALS — TEMPERATURE: 98.1 F

## 2022-09-21 DIAGNOSIS — C34.90 NON-SMALL CELL LUNG CANCER, UNSPECIFIED LATERALITY (HCC): Primary | ICD-10-CM

## 2022-09-21 DIAGNOSIS — Z98.61 CAD S/P PERCUTANEOUS CORONARY ANGIOPLASTY: ICD-10-CM

## 2022-09-21 DIAGNOSIS — R53.83 FATIGUE, UNSPECIFIED TYPE: ICD-10-CM

## 2022-09-21 DIAGNOSIS — D50.0 IRON DEFICIENCY ANEMIA DUE TO CHRONIC BLOOD LOSS: ICD-10-CM

## 2022-09-21 DIAGNOSIS — C78.1 MEDIASTINAL METASTASIS (HCC): ICD-10-CM

## 2022-09-21 DIAGNOSIS — I65.29 CAROTID STENOSIS: ICD-10-CM

## 2022-09-21 DIAGNOSIS — R13.10 ODYNOPHAGIA: ICD-10-CM

## 2022-09-21 DIAGNOSIS — I25.10 CAD S/P PERCUTANEOUS CORONARY ANGIOPLASTY: ICD-10-CM

## 2022-09-21 DIAGNOSIS — I25.10 CORONARY ARTERY DISEASE INVOLVING NATIVE CORONARY ARTERY OF NATIVE HEART WITHOUT ANGINA PECTORIS: ICD-10-CM

## 2022-09-21 DIAGNOSIS — Z79.899 HIGH RISK MEDICATION USE: ICD-10-CM

## 2022-09-21 DIAGNOSIS — C34.90 NON-SMALL CELL LUNG CANCER, UNSPECIFIED LATERALITY (HCC): ICD-10-CM

## 2022-09-21 LAB
ALBUMIN SERPL-MCNC: 3.2 G/DL (ref 3.2–4.6)
ALBUMIN/GLOB SERPL: 0.9 {RATIO} (ref 1.2–3.5)
ALP SERPL-CCNC: 56 U/L (ref 50–136)
ALT SERPL-CCNC: 23 U/L (ref 12–65)
ANION GAP SERPL CALC-SCNC: 4 MMOL/L (ref 4–13)
AST SERPL-CCNC: 22 U/L (ref 15–37)
BASOPHILS # BLD: 0 K/UL (ref 0–0.2)
BASOPHILS NFR BLD: 1 % (ref 0–2)
BILIRUB SERPL-MCNC: 0.3 MG/DL (ref 0.2–1.1)
BUN SERPL-MCNC: 13 MG/DL (ref 8–23)
CALCIUM SERPL-MCNC: 9 MG/DL (ref 8.3–10.4)
CHLORIDE SERPL-SCNC: 106 MMOL/L (ref 101–110)
CHOLEST SERPL-MCNC: 142 MG/DL
CO2 SERPL-SCNC: 27 MMOL/L (ref 21–32)
CREAT SERPL-MCNC: 0.7 MG/DL (ref 0.6–1)
DIFFERENTIAL METHOD BLD: ABNORMAL
EOSINOPHIL # BLD: 0 K/UL (ref 0–0.8)
EOSINOPHIL NFR BLD: 1 % (ref 0.5–7.8)
ERYTHROCYTE [DISTWIDTH] IN BLOOD BY AUTOMATED COUNT: 17.4 % (ref 11.9–14.6)
GLOBULIN SER CALC-MCNC: 3.6 G/DL (ref 2.3–3.5)
GLUCOSE SERPL-MCNC: 96 MG/DL (ref 65–100)
HCT VFR BLD AUTO: 31.3 % (ref 35.8–46.3)
HDLC SERPL-MCNC: 89 MG/DL (ref 40–60)
HDLC SERPL: 1.6 {RATIO}
HGB BLD-MCNC: 9.9 G/DL (ref 11.7–15.4)
IMM GRANULOCYTES # BLD AUTO: 0 K/UL (ref 0–0.5)
IMM GRANULOCYTES NFR BLD AUTO: 1 % (ref 0–5)
LDLC SERPL CALC-MCNC: 42.8 MG/DL
LYMPHOCYTES # BLD: 0.4 K/UL (ref 0.5–4.6)
LYMPHOCYTES NFR BLD: 11 % (ref 13–44)
MAGNESIUM SERPL-MCNC: 2 MG/DL (ref 1.8–2.4)
MCH RBC QN AUTO: 28.9 PG (ref 26.1–32.9)
MCHC RBC AUTO-ENTMCNC: 31.6 G/DL (ref 31.4–35)
MCV RBC AUTO: 91.5 FL (ref 79.6–97.8)
MONOCYTES # BLD: 0.3 K/UL (ref 0.1–1.3)
MONOCYTES NFR BLD: 7 % (ref 4–12)
NEUTS SEG # BLD: 3 K/UL (ref 1.7–8.2)
NEUTS SEG NFR BLD: 79 % (ref 43–78)
NRBC # BLD: 0 K/UL (ref 0–0.2)
PLATELET # BLD AUTO: 232 K/UL (ref 150–450)
PMV BLD AUTO: 9.4 FL (ref 9.4–12.3)
POTASSIUM SERPL-SCNC: 3.8 MMOL/L (ref 3.5–5.1)
PROT SERPL-MCNC: 6.8 G/DL (ref 6.3–8.2)
RBC # BLD AUTO: 3.42 M/UL (ref 4.05–5.2)
SODIUM SERPL-SCNC: 137 MMOL/L (ref 136–145)
TRIGL SERPL-MCNC: 51 MG/DL (ref 35–150)
VLDLC SERPL CALC-MCNC: 10.2 MG/DL (ref 6–23)
WBC # BLD AUTO: 3.7 K/UL (ref 4.3–11.1)

## 2022-09-21 PROCEDURE — 6360000002 HC RX W HCPCS: Performed by: INTERNAL MEDICINE

## 2022-09-21 PROCEDURE — 96413 CHEMO IV INFUSION 1 HR: CPT

## 2022-09-21 PROCEDURE — 2580000003 HC RX 258: Performed by: INTERNAL MEDICINE

## 2022-09-21 PROCEDURE — 1090F PRES/ABSN URINE INCON ASSESS: CPT | Performed by: INTERNAL MEDICINE

## 2022-09-21 PROCEDURE — 1123F ACP DISCUSS/DSCN MKR DOCD: CPT | Performed by: INTERNAL MEDICINE

## 2022-09-21 PROCEDURE — 80053 COMPREHEN METABOLIC PANEL: CPT

## 2022-09-21 PROCEDURE — 77386 HC NTSTY MODUL RAD TX DLVR CPLX: CPT

## 2022-09-21 PROCEDURE — G8427 DOCREV CUR MEDS BY ELIG CLIN: HCPCS | Performed by: INTERNAL MEDICINE

## 2022-09-21 PROCEDURE — G8400 PT W/DXA NO RESULTS DOC: HCPCS | Performed by: INTERNAL MEDICINE

## 2022-09-21 PROCEDURE — A4216 STERILE WATER/SALINE, 10 ML: HCPCS | Performed by: INTERNAL MEDICINE

## 2022-09-21 PROCEDURE — 83735 ASSAY OF MAGNESIUM: CPT

## 2022-09-21 PROCEDURE — 2500000003 HC RX 250 WO HCPCS: Performed by: INTERNAL MEDICINE

## 2022-09-21 PROCEDURE — 96417 CHEMO IV INFUS EACH ADDL SEQ: CPT

## 2022-09-21 PROCEDURE — 1036F TOBACCO NON-USER: CPT | Performed by: INTERNAL MEDICINE

## 2022-09-21 PROCEDURE — 36591 DRAW BLOOD OFF VENOUS DEVICE: CPT

## 2022-09-21 PROCEDURE — 99215 OFFICE O/P EST HI 40 MIN: CPT | Performed by: INTERNAL MEDICINE

## 2022-09-21 PROCEDURE — 3017F COLORECTAL CA SCREEN DOC REV: CPT | Performed by: INTERNAL MEDICINE

## 2022-09-21 PROCEDURE — 96367 TX/PROPH/DG ADDL SEQ IV INF: CPT

## 2022-09-21 PROCEDURE — 85025 COMPLETE CBC W/AUTO DIFF WBC: CPT

## 2022-09-21 PROCEDURE — G8420 CALC BMI NORM PARAMETERS: HCPCS | Performed by: INTERNAL MEDICINE

## 2022-09-21 PROCEDURE — 96375 TX/PRO/DX INJ NEW DRUG ADDON: CPT

## 2022-09-21 PROCEDURE — 80061 LIPID PANEL: CPT

## 2022-09-21 RX ORDER — SODIUM CHLORIDE 9 MG/ML
5-250 INJECTION, SOLUTION INTRAVENOUS PRN
Status: DISCONTINUED | OUTPATIENT
Start: 2022-09-21 | End: 2022-09-22 | Stop reason: HOSPADM

## 2022-09-21 RX ORDER — DIPHENHYDRAMINE HYDROCHLORIDE 50 MG/ML
50 INJECTION INTRAMUSCULAR; INTRAVENOUS ONCE
Status: COMPLETED | OUTPATIENT
Start: 2022-09-21 | End: 2022-09-21

## 2022-09-21 RX ORDER — ONDANSETRON 2 MG/ML
8 INJECTION INTRAMUSCULAR; INTRAVENOUS ONCE
Status: COMPLETED | OUTPATIENT
Start: 2022-09-21 | End: 2022-09-21

## 2022-09-21 RX ORDER — SODIUM CHLORIDE 9 MG/ML
5-40 INJECTION INTRAVENOUS PRN
Status: DISCONTINUED | OUTPATIENT
Start: 2022-09-21 | End: 2022-09-22 | Stop reason: HOSPADM

## 2022-09-21 RX ORDER — SODIUM CHLORIDE 0.9 % (FLUSH) 0.9 %
10 SYRINGE (ML) INJECTION PRN
Status: DISCONTINUED | OUTPATIENT
Start: 2022-09-21 | End: 2022-09-22 | Stop reason: HOSPADM

## 2022-09-21 RX ADMIN — PACLITAXEL 72 MG: 6 INJECTION, SOLUTION, CONCENTRATE INTRAVENOUS at 11:45

## 2022-09-21 RX ADMIN — ONDANSETRON 8 MG: 2 INJECTION INTRAMUSCULAR; INTRAVENOUS at 10:49

## 2022-09-21 RX ADMIN — SODIUM CHLORIDE 20 ML/HR: 9 INJECTION, SOLUTION INTRAVENOUS at 10:35

## 2022-09-21 RX ADMIN — FAMOTIDINE 20 MG: 10 INJECTION, SOLUTION INTRAVENOUS at 10:52

## 2022-09-21 RX ADMIN — SODIUM CHLORIDE, PRESERVATIVE FREE 10 ML: 5 INJECTION INTRAVENOUS at 08:08

## 2022-09-21 RX ADMIN — DEXAMETHASONE SODIUM PHOSPHATE 12 MG: 4 INJECTION, SOLUTION INTRAMUSCULAR; INTRAVENOUS at 10:58

## 2022-09-21 RX ADMIN — SODIUM CHLORIDE, PRESERVATIVE FREE 10 ML: 5 INJECTION INTRAVENOUS at 13:35

## 2022-09-21 RX ADMIN — CARBOPLATIN 188 MG: 10 INJECTION, SOLUTION INTRAVENOUS at 12:51

## 2022-09-21 RX ADMIN — DIPHENHYDRAMINE HYDROCHLORIDE 50 MG: 50 INJECTION INTRAMUSCULAR; INTRAVENOUS at 10:55

## 2022-09-21 ASSESSMENT — PATIENT HEALTH QUESTIONNAIRE - PHQ9
SUM OF ALL RESPONSES TO PHQ QUESTIONS 1-9: 0
SUM OF ALL RESPONSES TO PHQ9 QUESTIONS 1 & 2: 0
1. LITTLE INTEREST OR PLEASURE IN DOING THINGS: 0
SUM OF ALL RESPONSES TO PHQ QUESTIONS 1-9: 0
SUM OF ALL RESPONSES TO PHQ QUESTIONS 1-9: 0
2. FEELING DOWN, DEPRESSED OR HOPELESS: 0
SUM OF ALL RESPONSES TO PHQ QUESTIONS 1-9: 0

## 2022-09-21 NOTE — PROGRESS NOTES
Patient arrived to port lab via wheelchair for port access and lab draw. Port accessed and labs drawn per protocol with no complications. Port remains accessed. Patient discharged from port lab via wheelchair.

## 2022-09-21 NOTE — PATIENT INSTRUCTIONS
Patient Instructions from Today's Visit    Reason for Visit:  Prechemo    Plan:  Proceed with Carbo/Taxol today  We will work on a less expensive alternative to the Carlos    Follow Up:  Office visits weekly    Recent Lab Results:  Hospital Outpatient Visit on 09/21/2022   Component Date Value Ref Range Status    Cholesterol, Total 09/21/2022 142  <200 MG/DL Final    Comment: Borderline High: 200-239 mg/dL  High: Greater than or equal to 240 mg/dL      Triglycerides 09/21/2022 51  35 - 150 MG/DL Final    Comment: Borderline High: 150-199 mg/dL, High: 200-499 mg/dL  Very High: Greater than or equal to 500 mg/dL      HDL 09/21/2022 89 (A) 40 - 60 MG/DL Final    LDL Calculated 09/21/2022 42.8  <100 MG/DL Final    Comment: Near Optimal: 100-129 mg/dL  Borderline High: 130-159, High: 160-189 mg/dL  Very High: Greater than or equal to 190 mg/dL      VLDL Cholesterol Calculated 09/21/2022 10.2  6.0 - 23.0 MG/DL Final    Chol/HDL Ratio 09/21/2022 1.6    Final    Magnesium 09/21/2022 2.0  1.8 - 2.4 mg/dL Final    WBC 09/21/2022 3.7 (A) 4.3 - 11.1 K/uL Final    RBC 09/21/2022 3.42 (A) 4.05 - 5.2 M/uL Final    Hemoglobin 09/21/2022 9.9 (A) 11.7 - 15.4 g/dL Final    Hematocrit 09/21/2022 31.3 (A) 35.8 - 46.3 % Final    MCV 09/21/2022 91.5  79.6 - 97.8 FL Final    MCH 09/21/2022 28.9  26.1 - 32.9 PG Final    MCHC 09/21/2022 31.6  31.4 - 35.0 g/dL Final    RDW 09/21/2022 17.4 (A) 11.9 - 14.6 % Final    Platelets 87/02/9177 232  150 - 450 K/uL Final    MPV 09/21/2022 9.4  9.4 - 12.3 FL Final    nRBC 09/21/2022 0.00  0.0 - 0.2 K/uL Final    **Note: Absolute NRBC parameter is now reported with Hemogram**    Seg Neutrophils 09/21/2022 79 (A) 43 - 78 % Final    Lymphocytes 09/21/2022 11 (A) 13 - 44 % Final    Monocytes 09/21/2022 7  4.0 - 12.0 % Final    Eosinophils % 09/21/2022 1  0.5 - 7.8 % Final    Basophils 09/21/2022 1  0.0 - 2.0 % Final    Immature Granulocytes 09/21/2022 1  0.0 - 5.0 % Final    Segs Absolute 09/21/2022 3.0  1.7 - 8.2 K/UL Final    Absolute Lymph # 09/21/2022 0.4 (A) 0.5 - 4.6 K/UL Final    Absolute Mono # 09/21/2022 0.3  0.1 - 1.3 K/UL Final    Absolute Eos # 09/21/2022 0.0  0.0 - 0.8 K/UL Final    Basophils Absolute 09/21/2022 0.0  0.0 - 0.2 K/UL Final    Absolute Immature Granulocyte 09/21/2022 0.0  0.0 - 0.5 K/UL Final    Differential Type 09/21/2022 AUTOMATED    Final    Sodium 09/21/2022 137  136 - 145 mmol/L Final    Potassium 09/21/2022 3.8  3.5 - 5.1 mmol/L Final    Chloride 09/21/2022 106  101 - 110 mmol/L Final    CO2 09/21/2022 27  21 - 32 mmol/L Final    Anion Gap 09/21/2022 4  4 - 13 mmol/L Final    Glucose 09/21/2022 96  65 - 100 mg/dL Final    BUN 09/21/2022 13  8 - 23 MG/DL Final    Creatinine 09/21/2022 0.70  0.6 - 1.0 MG/DL Final    GFR  09/21/2022 >60  >60 ml/min/1.73m2 Final    GFR Non- 09/21/2022 >60  >60 ml/min/1.73m2 Final    Comment:      Estimated GFR is calculated using the Modification of Diet in Renal Disease (MDRD) Study equation, reported for both  Americans (GFRAA) and non- Americans (GFRNA), and normalized to 1.73m2 body surface area. The physician must decide which value applies to the patient. The MDRD study equation should only be used in individuals age 25 or older. It has not been validated for the following: pregnant women, patients with serious comorbid conditions,or on certain medications, or persons with extremes of body size, muscle mass, or nutritional status.       Calcium 09/21/2022 9.0  8.3 - 10.4 MG/DL Final    Total Bilirubin 09/21/2022 0.3  0.2 - 1.1 MG/DL Final    ALT 09/21/2022 23  12 - 65 U/L Final    AST 09/21/2022 22  15 - 37 U/L Final    Alk Phosphatase 09/21/2022 56  50 - 136 U/L Final    Total Protein 09/21/2022 6.8  6.3 - 8.2 g/dL Final    Albumin 09/21/2022 3.2  3.2 - 4.6 g/dL Final    Globulin 09/21/2022 3.6 (A) 2.3 - 3.5 g/dL Final    Albumin/Globulin Ratio 09/21/2022 0.9 (A) 1.2 - 3.5   Final Treatment Summary has been discussed and given to patient: n/a    -------------------------------------------------------------------------------------------------------------------  Please call our office at [phone] if you have any  of the following symptoms:   Fever of 100.5 or greater  Chills  Shortness of breath  Swelling or pain in one leg    After office hours an answering service is available and will contact a provider for emergencies or if you are experiencing any of the above symptoms. Patient does express an interest in My Chart. My Chart log in information explained on the after visit summary printout at the [facility] [address] desk.     Edmundo Morrissey RN

## 2022-09-21 NOTE — PROGRESS NOTES
[facility] Hematology & Oncology: Office Visit Progress Note    Chief Complaint:    Non-small cell lung cancer stage III    History of Present Illness:  Reason for Referral:  Non-small cell lung cancer     Referring Provider:  Dr. Nelia Virgen, [facility] Pulmonary and Critical Care     Primary Care Provider:  Dr. Rolf Freeman, [facility])     Family History of Cancer/Hematologic Disorders:  No documented family history     Presenting Symptoms:   Presented to [facility] ED with COPD exacerbation     Ms. Karlene Hooks is a 67 y.o.  female with a medical history of COPD, asthma, oxygen dependent, CAD, closed right hip and femur fracture (2/22), subdural hematoma (3/22), MI, and thyroid disease. Surgical history includes bilateral cataract removal, right hip surgery, and right carotid endarterectomy. She is a former smoker of 50 years but quit in 2017. Denies alcohol use. On 6/9/22 patient presented to the [facility] ED with increased shortness of breath over several days. Admitted with exacerbation of COPD. CXR showed unchanged consolidation of left lower lobe lung. On 6/11 CT Chest PE protocol was performed which revealed no PE but a increase in the left lower lobe lung mass. She was discharged home on 6/12 with instruction to follow up with pulmonary within 1 week. On 6/24/22 she had a PET scan which showed known lung mass and left hilar and subcarinal mediastinal activity as well. On 6/28/22 she was seen in follow up with pulmonary. PET imaging reviewed. Recommended EBUS. Procedure was performed on 7/20/22. Pathology showed malignant cells 11L lymph node. Referral to medical and radiation oncology for evaluation and treatment recommendations.       MRI Brain scheduled on 8/2 and radiation oncology consult on 7/29.       6/9/22 [facility] ED CXR  FINDINGS: The lungs are hyperexpanded, consistent with emphysematous changes on the prior CT chest. Areas of nodular consolidation in the left lower lobe are unchanged. No acute infiltrate or pulmonary edema is seen. The heart size, mediastinal contour and pulmonary vasculature are normal. There are coronary artery calcifications or stents. The thorax or pleural effusion is seen. IMPRESSION:  1. Emphysema. 2. Coronary artery disease. 3. Unchanged nodular consolidation in the left lower lobe. 6/11/22 [facility] CT Chest PE protocol  Findings: There is no pleural or pericardial effusion. The heart and great vessels are unremarkable. There are no filling defects within the pulmonary arteries to suggest pulmonary emboli. Moderate emphysematous changes are present. Patchy interstitial opacity is present within the right upper lobe  anteriorly. Similar but less impressive findings are newly visualized within the left upper lobe anteriorly. Again noted is the ill-defined masslike opacity within the left lower lobe measuring approximately 2.0 x 3.6 cm previously measured at approximately 1.7 x 2.8 cm. Also is a ground glass opacity within the left upper lobe measuring 1.5 cm, unchanged when remeasured on the previous study. A left hilar lymph node  measures 1.4 cm in short axis, unchanged. There is a small left Bochdalek hernia containing fat. Imaging of the upper abdomen is unremarkable. There are compression fractures within the thoracic and lumbar spine with mild height loss. The fractures at T10 and T11 has occurred in the interim. IMPRESSION:  1. No evidence of pulmonary embolism. 2. Interval increase in left lower lobe mass suspicious for bronchogenic carcinoma. A left hilar lymph node also is enlarged concerning for metastatic adenopathy. 3. Newly visualized interstitial opacities within the upper lobes, right greater than left, most likely infectious. 4. Stable left lower lobe groundglass opacity. 5.  Interval compression fractures with mild height loss at T10 and T11.     6/24/22 [facility] PET  FINDINGS:  NECK/CHEST:  No suspicious activity is seen in the neck. Only minimal nonfocal activity is seen about left posterior facets in the mid cervical spine likely related to benign degenerative changes. No clear aggressive osseous lesion is suggested on limited CT images. The most clear suspicious activity is seen associated with a large heterogeneous lesion in the left lower lobe demonstrating spiculated appearing components best appreciated on axial image 92 measuring 4.4 cm x 2.3 cm in greatest transverse dimensions and with a maximum SUV of 11.2 g/mL. Malignancy is not excluded given the appearance. Additional focal appearing activity is seen in the left hilum on  pet/CT image 85 with a maximum SUV of 9.1 g/mL and in the subcarinal mediastinum on axial image 84 with a maximum SUV of 6.3 g/mL concerning for left hilar and subcarinal mediastinal diego metastases, respectively. Additional mild activity is seen in the mediastinum and left hilum on pet/CT image 76 which does appear to correspond to small prevascular and left hilar lymph nodes seen on axial CT  image 39 of the prior the chest dated 6/11/2022 which are suspicious for additional early diego metastases. No clearly suspicious activity is otherwise seen in the chest. Mild to moderate activity is seen in the medial right upper lobe on pet/CT image 56 with a maximum SUV of 4.8 g/mL. Although the degree of activity is suspicious, this appears to correspond to a fluid containing cystic structure in the right upper lobe with a CT appearance being more suggestive of an inflammatory process. Additional soft tissue activity is seen over the lateral scapula which is most consistent with benign muscle activity. ABDOMEN/PELVIS:  The only potentially suspicious activity is skin activity involving the left  proximal medial thigh seen on pet/CT image 257 with a maximum SUV of 6.3 g/mL which appears to be associated with focal skin thickening.  This is not felt to be related to the patient's lung cancer. However, a dermal neoplasm or focal  inflammatory process is not excluded given the appearance. This should be further assessed with direct visualization. No suspicious activity is otherwise seen below the diaphragms. Activity is seen adjacent to the right femoral neck. However, this appears to correspond to heterotopic bone formation from hip  surgery at this level without aggressive features suggested therefore is not worrisome. No focal hepatic activity is seen. No contour deforming, or hypermetabolic adrenal lesion is seen. Advanced atherosclerotic calcification is seen of the abdominal aorta. IMPRESSION:  1. Heterogeneous left lower lobe mass demonstrating irregular spiculated components and measuring 4.4 cm x 2.3 cm in greatest transverse dimensions and with a maximum SUV of 11.2 g/mL. Malignancy is not excluded given the appearance. Further evaluation as clinically indicated is recommended. 2. Additional left hilar and subcarinal mediastinal activity concerning for diego metastases. Additional mild activity is seen within a prevascular mediastinal lymph node an additional left hilar lymph nodes for which additional early diego metastases are not excluded. 3. Focal activity associated with focal skin thickening in the most medial, proximal left thigh seen on pet/CT image 257. This is unlikely to be related to potential lung cancer although could represent an additional dermal neoplasm or focal abnormal inflammatory process otherwise. This should be clinically  evaluated with direct visualization. This has been marked on image to assist with clinical correlation. 4. Mild activity associated with a cystic abnormality in the medial right upper lobe. Although the degree of activity is suspicious (maximum SUV of 4.8 g/mL) the CT features are more consistent with a potential inflammatory process. Attention on follow-up studies is recommended. 7/20/22 [facility] EBUS  DIAGNOSIS  11L Lymph Node, Fine Needle Aspiration:      MALIGNANT CELLS PRESENT. Cell block: Malignant cells present. Immunohistochemical findings will follow in an addendum. Notes from Referring Provider:  None     Other Pertinent Information:  Covid vaccination - 1/28/21, 2/18/21, and 9/25/21     Radiation oncology consult with Dr. Billie Davis scheduled for 7/29/22 at 1 pm.     MRI Brain is scheduled for 8/2/22 at 8 am.         Review of Systems:  Constitutional Fatigue. Denies fever or chills. Denies weight loss or appetite changes. Denies anorexia. HEENT Denies trauma, bluring vision, hearing loss, ear pain, nosebleeds, sore throat, neck pain and ear discharge. Skin Denies lesions or rashes. Lungs Denies shortness of breath, cough, sputum production or hemoptysis. Cardiovascular Denies chest pain, palpitations, orthopnea, claudication and leg swelling. Gastrointestinal Odynophagia. Denies nausea, vomiting, bowel changes. Denies bloody or black stools. Denies abdominal pain.  Denies dysuria, frequency or hesitancy of urination   Neuro Denies headaches, visual changes or ataxia. Denies dizziness, tingling, tremors, sensory change, speech change, focal weakness and headaches. Hematology Denies nasal/gum bleeding, denies easy bruise   Endo Denies heat/cold intolerance, denies diabetes. MSK Denies back pain, swollen legs, myalgias and falls. Psychiatric/Behavioral Denies depression and substance abuse. The patient is not nervous/anxious. Allergies   Allergen Reactions    Promethazine Nausea And Vomiting and Other (See Comments)     Severe vomiting      Past Medical History:   Diagnosis Date    Acute respiratory failure with hypoxia (HCC) 08/22/2013    Asthma     CAD (coronary artery disease)     Followed by [facility].     Chest pain 05/31/2019    pt denies any reent CP or increase SOB    Closed right hip fracture ([facility] [address] 02/04/2022    COPD with acute lower respiratory infection ([facility] [address] 02/10/2022    severe COPD with centrilobular emphysema, on nocturnal O2.     EKG, abnormal 02/15/2022    Femur fracture, right ([address] 2022    Former cigarette smoker     H/O heart artery stent     several stents- last stents placed 2022    History of MI (myocardial infarction)     Lung mass     Multiple closed anterior-posterior compression fractures of pelvis ([facility] [address] 2022    Other fracture of right femur, initial encounter for closed fracture ([facility] [address] 2022    Oxygen dependent     2L NC continuous    Post-traumatic subdural hematoma ([facility] [address] 03/15/2022    SDH (subdural hematoma) ([facility] [address] 2022    hx of    Thyroid disease      Past Surgical History:   Procedure Laterality Date    BRONCHOSCOPY N/A 2022    BRONCHOSCOPY ENDOBRONCHIAL ULTRASOUND performed by Bria Tirado MD at [facility] ENDOSCOPY    CAROTID ENDARTERECTOMY Right     CATARACT REMOVAL Bilateral     HIP SURGERY Right     IR PORT PLACEMENT EQUAL OR GREATER THAN 5 YEARS  2022    IR PORT PLACEMENT EQUAL OR GREATER THAN 5 YEARS 2022 [facility] SPECIALS    WISDOM TOOTH EXTRACTION      as a teenagers     Family History   Adopted: Yes   Problem Relation Age of Onset    No Known Problems Mother     No Known Problems Father      Social History     Socioeconomic History    Marital status:      Spouse name: Not on file    Number of children: Not on file    Years of education: Not on file    Highest education level: Not on file   Occupational History    Not on file   Tobacco Use    Smoking status: Former     Packs/day: 0.50     Years: 50.00     Pack years: 25.00     Types: Cigarettes     Quit date:      Years since quittin.7    Smokeless tobacco: Never   Substance and Sexual Activity    Alcohol use: Not Currently    Drug use: Not on file    Sexual activity: Not on file   Other Topics Concern    Not on file   Social History Narrative    Not on file     Social Determinants of Health     Financial Resource Strain: Not on file   Food Insecurity: Not on file   Transportation Needs: Not on file   Physical Activity: Not on file   Stress: Not on file   Social Connections: Not on file   Intimate Partner Violence: Not on file   Housing Stability: Not on file     Current Outpatient Medications   Medication Sig Dispense Refill    Magic Mouthwash (MIRACLE MOUTHWASH) Swish and swallow 5 mLs 4 times daily as needed for Irritation Equal parts viscous lidocaine, maalox, nystatin, benadryl 480 mL 0    predniSONE (DELTASONE) 5 MG tablet TAKE 1 TABLET BY MOUTH TWICE DAILY      albuterol (PROVENTIL) (2.5 MG/3ML) 0.083% nebulizer solution TAKE 3 ML BY NEBULIZATION EVERY 6 HOURS AS NEEDED FOR SHORTNESS OF BREATH      tiotropium (SPIRIVA RESPIMAT) 2.5 MCG/ACT AERS inhaler Inhale 2 puffs into the lungs in the morning. 1 each 11    budesonide-formoterol (SYMBICORT) 160-4.5 MCG/ACT AERO Inhale 2 puffs into the lungs in the morning and 2 puffs before bedtime. 10.2 g 11    OXYGEN Inhale into the lungs 2 LPM      traMADol (ULTRAM) 50 MG tablet Take 1 tablet by mouth in the morning, at noon, and at bedtime for 90 days. Z51.5 Palliative Care 90 tablet 2    lidocaine-prilocaine (EMLA) 2.5-2.5 % cream Apply topically as needed.  30 g 1    ondansetron (ZOFRAN ODT) 4 MG disintegrating tablet Take 1 tablet by mouth every 8 hours as needed for Nausea or Vomiting 90 tablet 1    prochlorperazine (COMPAZINE) 10 MG tablet Take 1 tablet by mouth every 6 hours as needed (chemo-induced nausea) 90 tablet 2    predniSONE (DELTASONE) 10 MG tablet Take 10 mg by mouth daily (with breakfast)      Pseudoephedrine-guaiFENesin (MUCINEX D PO) Take by mouth in the morning and at bedtime      torsemide (DEMADEX) 10 MG tablet Take 10 mg by mouth daily      acetaminophen (TYLENOL) 325 MG tablet Take by mouth every 4 hours as needed      aspirin 81 MG chewable tablet Take 81 mg by mouth daily      atorvastatin (LIPITOR) 80 MG tablet Take 80 mg by mouth at bedtime TAKE 1 TABLET BY MOUTH DAILY      cetirizine (ZYRTEC) 10 MG tablet Take 10 mg by mouth daily as needed      clopidogrel (PLAVIX) 75 MG tablet Take 75 mg by mouth daily      escitalopram (LEXAPRO) 10 MG tablet Take 10 mg by mouth daily      ezetimibe (ZETIA) 10 MG tablet Take 10 mg by mouth at bedtime      methIMAzole (TAPAZOLE) 10 MG tablet Take 10 mg by mouth daily      metoprolol succinate (TOPROL XL) 100 MG extended release tablet Take 100 mg by mouth daily      nitroGLYCERIN (NITROSTAT) 0.4 MG SL tablet Place 0.4 mg under the tongue 3 times daily as needed      pantoprazole (PROTONIX) 40 MG tablet Take 40 mg by mouth 2 times daily      Roflumilast (DALIRESP) 500 MCG tablet Take 500 mcg by mouth nightly      butalbital-acetaminophen-caffeine (FIORICET, ESGIC) -40 MG per tablet Take 1 tablet by mouth every 6 hours as needed (Patient not taking: No sig reported)       No current facility-administered medications for this visit. Facility-Administered Medications Ordered in Other Visits   Medication Dose Route Frequency Provider Last Rate Last Admin    sodium chloride flush 0.9 % injection 10 mL  10 mL IntraVENous PRN Sarah Garcia MD   10 mL at 09/21/22 0808       OBJECTIVE:  /62 (Site: Right Upper Arm, Position: Sitting, Cuff Size: Medium Adult)   Pulse 70   Resp 21   Ht 5' 5.75\" (1.67 m)   Wt 134 lb 11.2 oz (61.1 kg)   SpO2 100%   BMI 21.91 kg/m²     Physical Exam:  Constitutional: Oriented to person, place, and time. Well-developed and well-nourished. HEENT: Normocephalic and atraumatic. Oropharynx is clear and moist.   Conjunctivae and EOM are normal. Pupils are equal, round, and reactive to light. No scleral icterus. Neck supple. No JVD present. No tracheal deviation present. No thyromegaly present. Lymph node No palpable submandibular, cervical, supraclavicular, axillary and inguinal lymph nodes. Skin Warm and dry. No bruising and no rash noted. No erythema. No pallor. Respiratory Effort normal and breath sounds normal.  No respiratory distress. No wheezes. No rales. No tenderness. CVS Normal rate, regular rhythm and normal heart sounds. Exam reveals no gallop, no friction and no rub. No murmur heard. Abdomen Soft. Bowel sounds are normal. Exhibits no distension. There is no tenderness. There is no rebound and no guarding. Neuro Normal reflexes. No cranial nerve deficit. Exhibits normal muscle tone, 5 of 5 strength of all extremities. MSK Normal range of motion in general.  No edema and no tenderness.    Psych Normal mood, affect, behavior, judgment and thought content      Labs:  Recent Results (from the past 24 hour(s))   Lipid Panel    Collection Time: 09/21/22  7:55 AM   Result Value Ref Range    Cholesterol, Total 142 <200 MG/DL    Triglycerides 51 35 - 150 MG/DL    HDL 89 (H) 40 - 60 MG/DL    LDL Calculated 42.8 <100 MG/DL    VLDL Cholesterol Calculated 10.2 6.0 - 23.0 MG/DL    Chol/HDL Ratio 1.6     Magnesium    Collection Time: 09/21/22  7:55 AM   Result Value Ref Range    Magnesium 2.0 1.8 - 2.4 mg/dL   CBC with Auto Differential    Collection Time: 09/21/22  7:55 AM   Result Value Ref Range    WBC 3.7 (L) 4.3 - 11.1 K/uL    RBC 3.42 (L) 4.05 - 5.2 M/uL    Hemoglobin 9.9 (L) 11.7 - 15.4 g/dL    Hematocrit 31.3 (L) 35.8 - 46.3 %    MCV 91.5 79.6 - 97.8 FL    MCH 28.9 26.1 - 32.9 PG    MCHC 31.6 31.4 - 35.0 g/dL    RDW 17.4 (H) 11.9 - 14.6 %    Platelets 932 693 - 573 K/uL    MPV 9.4 9.4 - 12.3 FL    nRBC 0.00 0.0 - 0.2 K/uL    Seg Neutrophils 79 (H) 43 - 78 %    Lymphocytes 11 (L) 13 - 44 %    Monocytes 7 4.0 - 12.0 %    Eosinophils % 1 0.5 - 7.8 %    Basophils 1 0.0 - 2.0 %    Immature Granulocytes 1 0.0 - 5.0 %    Segs Absolute 3.0 1.7 - 8.2 K/UL    Absolute Lymph # 0.4 (L) 0.5 - 4.6 K/UL    Absolute Mono # 0.3 0.1 - 1.3 K/UL    Absolute Eos # 0.0 0.0 - 0.8 K/UL    Basophils Absolute 0.0 0.0 - 0.2 K/UL    Absolute Immature Granulocyte 0.0 0.0 - 0.5 K/UL Differential Type AUTOMATED     Comprehensive Metabolic Panel    Collection Time: 09/21/22  7:55 AM   Result Value Ref Range    Sodium 137 136 - 145 mmol/L    Potassium 3.8 3.5 - 5.1 mmol/L    Chloride 106 101 - 110 mmol/L    CO2 27 21 - 32 mmol/L    Anion Gap 4 4 - 13 mmol/L    Glucose 96 65 - 100 mg/dL    BUN 13 8 - 23 MG/DL    Creatinine 0.70 0.6 - 1.0 MG/DL    GFR African American >60 >60 ml/min/1.73m2    GFR Non- >60 >60 ml/min/1.73m2    Calcium 9.0 8.3 - 10.4 MG/DL    Total Bilirubin 0.3 0.2 - 1.1 MG/DL    ALT 23 12 - 65 U/L    AST 22 15 - 37 U/L    Alk Phosphatase 56 50 - 136 U/L    Total Protein 6.8 6.3 - 8.2 g/dL    Albumin 3.2 3.2 - 4.6 g/dL    Globulin 3.6 (H) 2.3 - 3.5 g/dL    Albumin/Globulin Ratio 0.9 (L) 1.2 - 3.5         Imaging:  No results found for this or any previous visit. ASSESSMENT/PLAN:   Diagnosis Orders   1. Non-small cell lung cancer, unspecified laterality ([facility] [address]        2. High risk medication use        3. Mediastinal metastasis ([facility] [address]        4. Iron deficiency anemia due to chronic blood loss        5. Fatigue, unspecified type        6. Odynophagia                67 y.o. F consulted for lung adenocarcinoma presented to [facility] 7[facility]26/2022 with . She was a longtime smoker quitted 5 years ago, COPD on home oxygen, recently found left lower lobe 4.4 cm mass and PET showed avid left hilar and subcarinal lymphadenopathy, station 7 lymphadenopathy not accessible on EBUS but biopsy of station 11 L lymph node showed lung adenocarcinoma.   We discussed that this was consistent with stage III lung cancer, discussed potential curative intent therapy with concurrent chemoradiation followed by immunotherapy, all questions answered and patient is interested, we will arrange port placement and radiation oncology evaluation, discussed toxicities and management, patient takes Plavix for severe CAD with multiple coronary stents, no absolute contraindication but would need careful management, return on 9/6/2022 and ready to proceed with cycle 1, monitor bruising and platelet carefully, requested to check thyroid function and reportedly being placed on methimazole for supposed hyperthyroidism when she was hospitalized months ago without any follow-ups, check TSH actually elevated and refer to endocrinology to clarify the management, clarified questions regarding seafood, hemoglobin down to 9.9, increased fatigue is multifactorial but will give blood transfusion if desired, Magic mouthwash for odynophagia and concern of price, will work with pharmacy to find out cheaper alternative options, labs reviewed and proceed to cycle 3 CarboTaxol radiation, return next week but call as needed. All questions are answered to their satisfaction. They will call for further questions and concerns. ECOG PERFORMANCE STATUS - 1- Restricted in physically strenuous activity but ambulatory and able to carry out work of a light or sedentary nature such as light house work, office work. Pain - 2/10. None/Minimal pain - not affecting QOL     Fatigue - No flowsheet data found. Distress - No flowsheet data found. Elements of this note have been dictated via voice recognition software. Text and phrases may be limited by the accuracy and autoconversion of the software. The chart has been reviewed, but errors may still be present. Carrie Kasper M.D.   [address], [address] [place] [address]  Office : [phone]  Fax : [phone]

## 2022-09-22 ENCOUNTER — HOSPITAL ENCOUNTER (OUTPATIENT)
Dept: RADIATION ONCOLOGY | Age: 72
Setting detail: RECURRING SERIES
Discharge: HOME | End: 2022-09-25
Payer: MEDICARE

## 2022-09-22 ENCOUNTER — PATIENT MESSAGE (OUTPATIENT)
Dept: ONCOLOGY | Age: 72
End: 2022-09-22

## 2022-09-22 ENCOUNTER — TELEPHONE (OUTPATIENT)
Dept: CARDIOLOGY CLINIC | Age: 72
End: 2022-09-22

## 2022-09-22 ENCOUNTER — PATIENT MESSAGE (OUTPATIENT)
Dept: CARDIOLOGY CLINIC | Age: 72
End: 2022-09-22

## 2022-09-22 PROCEDURE — 77386 HC NTSTY MODUL RAD TX DLVR CPLX: CPT

## 2022-09-22 NOTE — TELEPHONE ENCOUNTER
From: Sudhir Nugent  To: JIN Roman  Sent: 9/22/2022 8:07 AM EDT  Subject: Appointments    Good morning. Please review my appointments for Tuesday September 27th and Wednesday September 28th. It looks like Im double booked. Typically I have all of these on a Wednesday. Also, I seem to be scheduled to see a nurse that [facility] never seen. Who is that person? Thank you very much for your help.

## 2022-09-22 NOTE — TELEPHONE ENCOUNTER
From: Anastacio Nugent  To: Dr. Rod Fraction: 9/22/2022 8:16 AM EDT  Subject: Cholesterol    In a recent blood test my HDL seems excessively high. What needs to be done to bring it to a reasonable level? Thanks and I hope you are doing well.   Aime Lopez

## 2022-09-23 ENCOUNTER — HOSPITAL ENCOUNTER (OUTPATIENT)
Dept: RADIATION ONCOLOGY | Age: 72
Setting detail: RECURRING SERIES
Discharge: HOME | End: 2022-09-26
Payer: MEDICARE

## 2022-09-23 PROCEDURE — 77386 HC NTSTY MODUL RAD TX DLVR CPLX: CPT

## 2022-09-26 ENCOUNTER — HOSPITAL ENCOUNTER (OUTPATIENT)
Dept: RADIATION ONCOLOGY | Age: 72
Setting detail: RECURRING SERIES
Discharge: HOME | End: 2022-09-29
Payer: MEDICARE

## 2022-09-26 DIAGNOSIS — C34.90 NON-SMALL CELL LUNG CANCER, UNSPECIFIED LATERALITY (HCC): Primary | ICD-10-CM

## 2022-09-26 PROCEDURE — 77386 HC NTSTY MODUL RAD TX DLVR CPLX: CPT

## 2022-09-26 NOTE — PROGRESS NOTES
Patient: Rigoberto Zapata MRN: [id number]  SSN: [id number]    YOB: 1950  Age: 67 y.o. Sex: female      DIAGNOSIS:  Stage III NSCLC of the JHONY    TREATMENT SITE:  Lungs    DOSE and FRACTIONATION:  14 of 30 fractions; 2800 of 6000cGy    INTERVAL HISTORY:  Rigoberto Zapata is a 67 y.o. female being treated for Stage III NSCLC of the JHONY. Week 1: No complaints. Week 2: Mild esophagitis. Week 3: Symptoms stable. OBJECTIVE:  NAD  There were no vitals filed for this visit. Lab Results   Component Value Date/Time     09/21/2022 07:55 AM    K 3.8 09/21/2022 07:55 AM     09/21/2022 07:55 AM    CO2 27 09/21/2022 07:55 AM    BUN 13 09/21/2022 07:55 AM    GFRAA >60 09/21/2022 07:55 AM    MG 2.0 09/21/2022 07:55 AM    GLOB 3.6 09/21/2022 07:55 AM    ALT 23 09/21/2022 07:55 AM     Lab Results   Component Value Date/Time    WBC 3.7 09/21/2022 07:55 AM    HGB 9.9 09/21/2022 07:55 AM    HCT 31.3 09/21/2022 07:55 AM     09/21/2022 07:55 AM     ASSESSMENT and PLAN:  Rigoberto Zapata is tolerating radiation as anticipated for the current dose and fraction. We will continue on as planned with another treatment visit anticipated next week. Continue MMW and Tramadol as needed.     Eusebio Brooks MD   September 26, 2022

## 2022-09-27 ENCOUNTER — HOSPITAL ENCOUNTER (OUTPATIENT)
Dept: RADIATION ONCOLOGY | Age: 72
Setting detail: RECURRING SERIES
Discharge: HOME | End: 2022-09-30
Payer: MEDICARE

## 2022-09-27 ENCOUNTER — HOSPITAL ENCOUNTER (OUTPATIENT)
Dept: LAB | Age: 72
Discharge: HOME | End: 2022-09-30
Payer: MEDICARE

## 2022-09-27 ENCOUNTER — CLINICAL DOCUMENTATION (OUTPATIENT)
Dept: ONCOLOGY | Age: 72
End: 2022-09-27

## 2022-09-27 ENCOUNTER — OFFICE VISIT (OUTPATIENT)
Dept: ONCOLOGY | Age: 72
End: 2022-09-27
Payer: MEDICARE

## 2022-09-27 VITALS
RESPIRATION RATE: 16 BRPM | BODY MASS INDEX: 21.62 KG/M2 | HEIGHT: 66 IN | OXYGEN SATURATION: 98 % | DIASTOLIC BLOOD PRESSURE: 61 MMHG | WEIGHT: 134.5 LBS | TEMPERATURE: 98.1 F | HEART RATE: 74 BPM | SYSTOLIC BLOOD PRESSURE: 137 MMHG

## 2022-09-27 DIAGNOSIS — R53.83 FATIGUE DUE TO TREATMENT: ICD-10-CM

## 2022-09-27 DIAGNOSIS — C34.90 NON-SMALL CELL LUNG CANCER, UNSPECIFIED LATERALITY (HCC): ICD-10-CM

## 2022-09-27 DIAGNOSIS — C34.90 NON-SMALL CELL LUNG CANCER, UNSPECIFIED LATERALITY (HCC): Primary | ICD-10-CM

## 2022-09-27 LAB
ALBUMIN SERPL-MCNC: 3.5 G/DL (ref 3.2–4.6)
ALBUMIN/GLOB SERPL: 0.9 {RATIO} (ref 1.2–3.5)
ALP SERPL-CCNC: 61 U/L (ref 50–136)
ALT SERPL-CCNC: 27 U/L (ref 12–65)
ANION GAP SERPL CALC-SCNC: 6 MMOL/L (ref 4–13)
AST SERPL-CCNC: 23 U/L (ref 15–37)
BASOPHILS # BLD: 0 K/UL (ref 0–0.2)
BASOPHILS NFR BLD: 1 % (ref 0–2)
BILIRUB SERPL-MCNC: 0.4 MG/DL (ref 0.2–1.1)
BUN SERPL-MCNC: 16 MG/DL (ref 8–23)
CALCIUM SERPL-MCNC: 9.7 MG/DL (ref 8.3–10.4)
CHLORIDE SERPL-SCNC: 101 MMOL/L (ref 101–110)
CO2 SERPL-SCNC: 29 MMOL/L (ref 21–32)
CREAT SERPL-MCNC: 0.9 MG/DL (ref 0.6–1)
DIFFERENTIAL METHOD BLD: ABNORMAL
EOSINOPHIL # BLD: 0 K/UL (ref 0–0.8)
EOSINOPHIL NFR BLD: 0 % (ref 0.5–7.8)
ERYTHROCYTE [DISTWIDTH] IN BLOOD BY AUTOMATED COUNT: 17.2 % (ref 11.9–14.6)
GLOBULIN SER CALC-MCNC: 3.9 G/DL (ref 2.3–3.5)
GLUCOSE SERPL-MCNC: 89 MG/DL (ref 65–100)
HCT VFR BLD AUTO: 34.8 % (ref 35.8–46.3)
HGB BLD-MCNC: 10.9 G/DL (ref 11.7–15.4)
IMM GRANULOCYTES # BLD AUTO: 0 K/UL (ref 0–0.5)
IMM GRANULOCYTES NFR BLD AUTO: 1 % (ref 0–5)
LYMPHOCYTES # BLD: 0.5 K/UL (ref 0.5–4.6)
LYMPHOCYTES NFR BLD: 10 % (ref 13–44)
MAGNESIUM SERPL-MCNC: 2.1 MG/DL (ref 1.8–2.4)
MCH RBC QN AUTO: 29.2 PG (ref 26.1–32.9)
MCHC RBC AUTO-ENTMCNC: 31.3 G/DL (ref 31.4–35)
MCV RBC AUTO: 93.3 FL (ref 79.6–97.8)
MONOCYTES # BLD: 0.3 K/UL (ref 0.1–1.3)
MONOCYTES NFR BLD: 7 % (ref 4–12)
NEUTS SEG # BLD: 4 K/UL (ref 1.7–8.2)
NEUTS SEG NFR BLD: 81 % (ref 43–78)
NRBC # BLD: 0 K/UL (ref 0–0.2)
PLATELET # BLD AUTO: 237 K/UL (ref 150–450)
PMV BLD AUTO: 9.2 FL (ref 9.4–12.3)
POTASSIUM SERPL-SCNC: 4.5 MMOL/L (ref 3.5–5.1)
PROT SERPL-MCNC: 7.4 G/DL (ref 6.3–8.2)
RBC # BLD AUTO: 3.73 M/UL (ref 4.05–5.2)
SODIUM SERPL-SCNC: 136 MMOL/L (ref 136–145)
WBC # BLD AUTO: 4.9 K/UL (ref 4.3–11.1)

## 2022-09-27 PROCEDURE — 3017F COLORECTAL CA SCREEN DOC REV: CPT | Performed by: NURSE PRACTITIONER

## 2022-09-27 PROCEDURE — G8400 PT W/DXA NO RESULTS DOC: HCPCS | Performed by: NURSE PRACTITIONER

## 2022-09-27 PROCEDURE — 83735 ASSAY OF MAGNESIUM: CPT

## 2022-09-27 PROCEDURE — 1036F TOBACCO NON-USER: CPT | Performed by: NURSE PRACTITIONER

## 2022-09-27 PROCEDURE — 99214 OFFICE O/P EST MOD 30 MIN: CPT | Performed by: NURSE PRACTITIONER

## 2022-09-27 PROCEDURE — 77336 RADIATION PHYSICS CONSULT: CPT

## 2022-09-27 PROCEDURE — 1090F PRES/ABSN URINE INCON ASSESS: CPT | Performed by: NURSE PRACTITIONER

## 2022-09-27 PROCEDURE — G8427 DOCREV CUR MEDS BY ELIG CLIN: HCPCS | Performed by: NURSE PRACTITIONER

## 2022-09-27 PROCEDURE — G8420 CALC BMI NORM PARAMETERS: HCPCS | Performed by: NURSE PRACTITIONER

## 2022-09-27 PROCEDURE — 36415 COLL VENOUS BLD VENIPUNCTURE: CPT

## 2022-09-27 PROCEDURE — 77386 HC NTSTY MODUL RAD TX DLVR CPLX: CPT

## 2022-09-27 PROCEDURE — 85025 COMPLETE CBC W/AUTO DIFF WBC: CPT

## 2022-09-27 PROCEDURE — 80053 COMPREHEN METABOLIC PANEL: CPT

## 2022-09-27 PROCEDURE — 1123F ACP DISCUSS/DSCN MKR DOCD: CPT | Performed by: NURSE PRACTITIONER

## 2022-09-27 ASSESSMENT — PATIENT HEALTH QUESTIONNAIRE - PHQ9
SUM OF ALL RESPONSES TO PHQ QUESTIONS 1-9: 0
2. FEELING DOWN, DEPRESSED OR HOPELESS: 0
SUM OF ALL RESPONSES TO PHQ QUESTIONS 1-9: 0
SUM OF ALL RESPONSES TO PHQ QUESTIONS 1-9: 0
SUM OF ALL RESPONSES TO PHQ9 QUESTIONS 1 & 2: 0
SUM OF ALL RESPONSES TO PHQ QUESTIONS 1-9: 0
1. LITTLE INTEREST OR PLEASURE IN DOING THINGS: 0

## 2022-09-27 NOTE — PROGRESS NOTES
[facility] Hematology & Oncology: Office Visit Progress Note    Chief Complaint:    Non-small cell lung cancer stage III    History of Present Illness:  Reason for Referral:  Non-small cell lung cancer     Referring Provider:  Dr. Eunice Peace, [facility] Pulmonary and Critical Care     Primary Care Provider:  Dr. Azul Dunaway, [facility])     Family History of Cancer/Hematologic Disorders:  No documented family history     Presenting Symptoms:   Presented to [facility] ED with COPD exacerbation     Ms. Eduardo Gaitan is a 67 y.o.  female with a medical history of COPD, asthma, oxygen dependent, CAD, closed right hip and femur fracture (2/22), subdural hematoma (3/22), MI, and thyroid disease. Surgical history includes bilateral cataract removal, right hip surgery, and right carotid endarterectomy. She is a former smoker of 50 years but quit in 2017. Denies alcohol use. On 6/9/22 patient presented to the [facility] ED with increased shortness of breath over several days. Admitted with exacerbation of COPD. CXR showed unchanged consolidation of left lower lobe lung. On 6/11 CT Chest PE protocol was performed which revealed no PE but a increase in the left lower lobe lung mass. She was discharged home on 6/12 with instruction to follow up with pulmonary within 1 week. On 6/24/22 she had a PET scan which showed known lung mass and left hilar and subcarinal mediastinal activity as well. On 6/28/22 she was seen in follow up with pulmonary. PET imaging reviewed. Recommended EBUS. Procedure was performed on 7/20/22. Pathology showed malignant cells 11L lymph node. Referral to medical and radiation oncology for evaluation and treatment recommendations.       MRI Brain scheduled on 8/2 and radiation oncology consult on 7/29.       6/9/22 [facility] ED CXR  FINDINGS: The lungs are hyperexpanded, consistent with emphysematous changes on the prior CT chest. Areas of nodular PET  FINDINGS:  NECK/CHEST:  No suspicious activity is seen in the neck. Only minimal nonfocal activity is seen about left posterior facets in the mid cervical spine likely related to benign degenerative changes. No clear aggressive osseous lesion is suggested on limited CT images. The most clear suspicious activity is seen associated with a large heterogeneous lesion in the left lower lobe demonstrating spiculated appearing components best appreciated on axial image 92 measuring 4.4 cm x 2.3 cm in greatest transverse dimensions and with a maximum SUV of 11.2 g/mL. Malignancy is not excluded given the appearance. Additional focal appearing activity is seen in the left hilum on  pet/CT image 85 with a maximum SUV of 9.1 g/mL and in the subcarinal mediastinum on axial image 84 with a maximum SUV of 6.3 g/mL concerning for left hilar and subcarinal mediastinal diego metastases, respectively. Additional mild activity is seen in the mediastinum and left hilum on pet/CT image 76 which does appear to correspond to small prevascular and left hilar lymph nodes seen on axial CT  image 39 of the prior the chest dated 6/11/2022 which are suspicious for additional early diego metastases. No clearly suspicious activity is otherwise seen in the chest. Mild to moderate activity is seen in the medial right upper lobe on pet/CT image 56 with a maximum SUV of 4.8 g/mL. Although the degree of activity is suspicious, this appears to correspond to a fluid containing cystic structure in the right upper lobe with a CT appearance being more suggestive of an inflammatory process. Additional soft tissue activity is seen over the lateral scapula which is most consistent with benign muscle activity. ABDOMEN/PELVIS:  The only potentially suspicious activity is skin activity involving the left  proximal medial thigh seen on pet/CT image 257 with a maximum SUV of 6.3 g/mL which appears to be associated with focal skin thickening.  This is not felt to be related to the patient's lung cancer. However, a dermal neoplasm or focal  inflammatory process is not excluded given the appearance. This should be further assessed with direct visualization. No suspicious activity is otherwise seen below the diaphragms. Activity is seen adjacent to the right femoral neck. However, this appears to correspond to heterotopic bone formation from hip  surgery at this level without aggressive features suggested therefore is not worrisome. No focal hepatic activity is seen. No contour deforming, or hypermetabolic adrenal lesion is seen. Advanced atherosclerotic calcification is seen of the abdominal aorta. IMPRESSION:  1. Heterogeneous left lower lobe mass demonstrating irregular spiculated components and measuring 4.4 cm x 2.3 cm in greatest transverse dimensions and with a maximum SUV of 11.2 g/mL. Malignancy is not excluded given the appearance. Further evaluation as clinically indicated is recommended. 2. Additional left hilar and subcarinal mediastinal activity concerning for diego metastases. Additional mild activity is seen within a prevascular mediastinal lymph node an additional left hilar lymph nodes for which additional early diego metastases are not excluded. 3. Focal activity associated with focal skin thickening in the most medial, proximal left thigh seen on pet/CT image 257. This is unlikely to be related to potential lung cancer although could represent an additional dermal neoplasm or focal abnormal inflammatory process otherwise. This should be clinically  evaluated with direct visualization. This has been marked on image to assist with clinical correlation. 4. Mild activity associated with a cystic abnormality in the medial right upper lobe. Although the degree of activity is suspicious (maximum SUV of 4.8 g/mL) the CT features are more consistent with a potential inflammatory process. Attention on follow-up studies is recommended. TAKE 1 TABLET BY MOUTH DAILY      cetirizine (ZYRTEC) 10 MG tablet Take 10 mg by mouth daily as needed      clopidogrel (PLAVIX) 75 MG tablet Take 75 mg by mouth daily      escitalopram (LEXAPRO) 10 MG tablet Take 10 mg by mouth daily      ezetimibe (ZETIA) 10 MG tablet Take 10 mg by mouth at bedtime      methIMAzole (TAPAZOLE) 10 MG tablet Take 10 mg by mouth daily      metoprolol succinate (TOPROL XL) 100 MG extended release tablet Take 100 mg by mouth daily      nitroGLYCERIN (NITROSTAT) 0.4 MG SL tablet Place 0.4 mg under the tongue 3 times daily as needed      pantoprazole (PROTONIX) 40 MG tablet Take 40 mg by mouth 2 times daily      Roflumilast (DALIRESP) 500 MCG tablet Take 500 mcg by mouth nightly      butalbital-acetaminophen-caffeine (FIORICET, ESGIC) -40 MG per tablet Take 1 tablet by mouth every 6 hours as needed (Patient not taking: No sig reported)       No current facility-administered medications for this visit. OBJECTIVE:  /61 (Site: Left Upper Arm, Position: Sitting, Cuff Size: Medium Adult)   Pulse 74   Temp 98.1 °F (36.7 °C) (Oral)   Resp 16   Ht 5' 5.75\" (1.67 m)   Wt 134 lb 8 oz (61 kg)   SpO2 98%   BMI 21.87 kg/m²     Physical Exam:  Constitutional: Oriented to person, place, and time. Well-developed and well-nourished. HEENT: Normocephalic and atraumatic. Oropharynx is clear and moist.   Conjunctivae and EOM are normal. Pupils are equal, round, and reactive to light. No scleral icterus. Neck supple. No JVD present. No tracheal deviation present. No thyromegaly present. Lymph node No palpable submandibular, cervical, supraclavicular, axillary and inguinal lymph nodes. Skin Warm and dry. No bruising and no rash noted. No erythema. No pallor. Respiratory Effort normal and breath sounds normal.  No respiratory distress. No wheezes. No rales. No tenderness. CVS Normal rate, regular rhythm and normal heart sounds.   Exam reveals no gallop, no friction and no rub. No murmur heard. Abdomen Soft. Bowel sounds are normal. Exhibits no distension. There is no tenderness. There is no rebound and no guarding. Neuro Normal reflexes. No cranial nerve deficit. Exhibits normal muscle tone, 5 of 5 strength of all extremities. MSK Normal range of motion in general.  No edema and no tenderness.    Psych Normal mood, affect, behavior, judgment and thought content      Labs:  Recent Results (from the past 24 hour(s))   Magnesium    Collection Time: 09/27/22  9:13 AM   Result Value Ref Range    Magnesium 2.1 1.8 - 2.4 mg/dL   Comprehensive Metabolic Panel    Collection Time: 09/27/22  9:13 AM   Result Value Ref Range    Sodium 136 136 - 145 mmol/L    Potassium 4.5 3.5 - 5.1 mmol/L    Chloride 101 101 - 110 mmol/L    CO2 29 21 - 32 mmol/L    Anion Gap 6 4 - 13 mmol/L    Glucose 89 65 - 100 mg/dL    BUN 16 8 - 23 MG/DL    Creatinine 0.90 0.6 - 1.0 MG/DL    GFR African American >60 >60 ml/min/1.73m2    GFR Non- >60 >60 ml/min/1.73m2    Calcium 9.7 8.3 - 10.4 MG/DL    Total Bilirubin 0.4 0.2 - 1.1 MG/DL    ALT 27 12 - 65 U/L    AST 23 15 - 37 U/L    Alk Phosphatase 61 50 - 136 U/L    Total Protein 7.4 6.3 - 8.2 g/dL    Albumin 3.5 3.2 - 4.6 g/dL    Globulin 3.9 (H) 2.3 - 3.5 g/dL    Albumin/Globulin Ratio 0.9 (L) 1.2 - 3.5     CBC with Auto Differential    Collection Time: 09/27/22  9:13 AM   Result Value Ref Range    WBC 4.9 4.3 - 11.1 K/uL    RBC 3.73 (L) 4.05 - 5.2 M/uL    Hemoglobin 10.9 (L) 11.7 - 15.4 g/dL    Hematocrit 34.8 (L) 35.8 - 46.3 %    MCV 93.3 79.6 - 97.8 FL    MCH 29.2 26.1 - 32.9 PG    MCHC 31.3 (L) 31.4 - 35.0 g/dL    RDW 17.2 (H) 11.9 - 14.6 %    Platelets 294 462 - 354 K/uL    MPV 9.2 (L) 9.4 - 12.3 FL    nRBC 0.00 0.0 - 0.2 K/uL    Seg Neutrophils 81 (H) 43 - 78 %    Lymphocytes 10 (L) 13 - 44 %    Monocytes 7 4.0 - 12.0 %    Eosinophils % 0 (L) 0.5 - 7.8 %    Basophils 1 0.0 - 2.0 %    Immature Granulocytes 1 0.0 - 5.0 %    Segs Absolute 4.0 1.7 - 8.2 K/UL    Absolute Lymph # 0.5 0.5 - 4.6 K/UL    Absolute Mono # 0.3 0.1 - 1.3 K/UL    Absolute Eos # 0.0 0.0 - 0.8 K/UL    Basophils Absolute 0.0 0.0 - 0.2 K/UL    Absolute Immature Granulocyte 0.0 0.0 - 0.5 K/UL    Differential Type AUTOMATED         Imaging:  No results found for this or any previous visit. ASSESSMENT/PLAN:   Diagnosis Orders   1. Non-small cell lung cancer, unspecified laterality (HCC)  Magnesium    Comprehensive Metabolic Panel    CBC with Auto Differential              67 y.o. F consulted for lung adenocarcinoma presented to [facility] on 7/26/2022 with . She was a longtime smoker quitted 5 years ago, COPD on home oxygen, recently found left lower lobe 4.4 cm mass and PET showed avid left hilar and subcarinal lymphadenopathy, station 7 lymphadenopathy not accessible on EBUS but biopsy of station 11 L lymph node showed lung adenocarcinoma.   We discussed that this was consistent with stage III lung cancer, discussed potential curative intent therapy with concurrent chemoradiation followed by immunotherapy, all questions answered and patient is interested, we will arrange port placement and radiation oncology evaluation, discussed toxicities and management, patient takes Plavix for severe CAD with multiple coronary stents, no absolute contraindication but would need careful management, return on 9/6/2022 and ready to proceed with cycle 1, monitor bruising and platelet carefully, requested to check thyroid function and reportedly being placed on methimazole for supposed hyperthyroidism when she was hospitalized months ago without any follow-ups, check TSH actually elevated and refer to endocrinology to clarify the management, clarified questions regarding seafood, hemoglobin down to 9.9, increased fatigue is multifactorial but will give blood transfusion if desired, Magic mouthwash for odynophagia and concern of price, will work with pharmacy to find out cheaper alternative options, labs reviewed and proceed to cycle 3 CarboTaxol radiation, return next week but call as needed. 9/27/22 Here for follow-up and C4 carbo/taxol/XRT. She is tolerating well to date. Her biggest complaint is fatigue. She notes that she takes a nap in the the AM and PM but is otherwise awake the rest of the day and still able to do her ADLs. Discussed increasing activity level as tolerated to help with fatigue. She has some esophagitis and MMW continues to help. Nausea is minimal - she continues Zofran for the first 72 hrs and finds this is helpful. No GI side effects. She did develop some nosebleeds in the last couple days. She is on 2L NC but has a humidifier for her oxygen that she can use. No new pulmonary complaints or fever. She has no persistent neuropathy. Labs reviewed. Hgb improved to 10.9 this week and discussed that we don't typically transfuse for Hgb <7-8. Acceptable to proceed with treatment as planned. RTC next week or call sooner if needed. All questions are answered to their satisfaction. They will call for further questions and concerns. ECOG PERFORMANCE STATUS - 1- Restricted in physically strenuous activity but ambulatory and able to carry out work of a light or sedentary nature such as light house work, office work. Pain - 3/10. None/Minimal pain - not affecting QOL     Fatigue - No flowsheet data found. Distress - No flowsheet data found. Elements of this note have been dictated via voice recognition software. Text and phrases may be limited by the accuracy and autoconversion of the software. The chart has been reviewed, but errors may still be present.              YISEL Light - [place]  Hematology & Oncology  [address] [facility] [address]  Office : [phone]  Fax : [phone]

## 2022-09-27 NOTE — PROGRESS NOTES
9/27/2022 - Pt seen by Anju Alvarez NP, for pre-chemo visit. MMW working well with swallowing. Pt reporting slight nosebleeds; advised pt to add distilled water to oxygen concentrator. Last RT on 10/18; will have scan after last RT/chemotherapy. Will discuss at next OV with Dr. Raiza Briones. Pt asking about getting iron tx. Will discuss with Dr. Raiza Briones this afternoon and get back with pt with answer. Labs reviewed; OK to proceed with infusion tomorrow. Encouraged to call with questions. Navigation will continue to follow.

## 2022-09-27 NOTE — PATIENT INSTRUCTIONS
Patient Instructions from Today's Visit    Reason for Visit:  Pre-chemo visit    Diagnosis Information:  [email or web address]/. net/about-us/asco-answers-patient-education-materials/iwvd-uhpecke-qfxc-sheets    Plan:  Bryon Boone is helping with swallowing. Apply the distilled water to the oxygen concentrator to help with intermittent nose bleeds. Try to be more active by doing things around the house, as well as exercises you can do while you're sitting down. Encourage good protein and fluid intake. Tingling and numbing in 1 toe on the left foot; let us know if it gets any worse. You have your last radiation treatment on 10/18, as well as chemotherapy. We will schedule a CT after next treatment. Labs reviewed; OK to proceed to infusion. Follow Up: In 1 week    Recent Lab Results:  Hospital Outpatient Visit on 09/27/2022   Component Date Value Ref Range Status    Magnesium 09/27/2022 2.1  1.8 - 2.4 mg/dL Final    Sodium 09/27/2022 136  136 - 145 mmol/L Final    Potassium 09/27/2022 4.5  3.5 - 5.1 mmol/L Final    Chloride 09/27/2022 101  101 - 110 mmol/L Final    CO2 09/27/2022 29  21 - 32 mmol/L Final    Anion Gap 09/27/2022 6  4 - 13 mmol/L Final    Glucose 09/27/2022 89  65 - 100 mg/dL Final    BUN 09/27/2022 16  8 - 23 MG/DL Final    Creatinine 09/27/2022 0.90  0.6 - 1.0 MG/DL Final    GFR  09/27/2022 >60  >60 ml/min/1.73m2 Final    GFR Non- 09/27/2022 >60  >60 ml/min/1.73m2 Final    Comment:      Estimated GFR is calculated using the Modification of Diet in Renal Disease (MDRD) Study equation, reported for both  Americans (GFRAA) and non- Americans (GFRNA), and normalized to 1.73m2 body surface area. The physician must decide which value applies to the patient. The MDRD study equation should only be used in individuals age 25 or older.  It has not been validated for the following: pregnant women, patients with serious comorbid conditions,or on certain medications, or persons with extremes of body size, muscle mass, or nutritional status.       Calcium 09/27/2022 9.7  8.3 - 10.4 MG/DL Final    Total Bilirubin 09/27/2022 0.4  0.2 - 1.1 MG/DL Final    ALT 09/27/2022 27  12 - 65 U/L Final    AST 09/27/2022 23  15 - 37 U/L Final    Alk Phosphatase 09/27/2022 61  50 - 136 U/L Final    Total Protein 09/27/2022 7.4  6.3 - 8.2 g/dL Final    Albumin 09/27/2022 3.5  3.2 - 4.6 g/dL Final    Globulin 09/27/2022 3.9 (A)  2.3 - 3.5 g/dL Final    Albumin/Globulin Ratio 09/27/2022 0.9 (A)  1.2 - 3.5   Final    WBC 09/27/2022 4.9  4.3 - 11.1 K/uL Final    RBC 09/27/2022 3.73 (A)  4.05 - 5.2 M/uL Final    Hemoglobin 09/27/2022 10.9 (A)  11.7 - 15.4 g/dL Final    Hematocrit 09/27/2022 34.8 (A)  35.8 - 46.3 % Final    MCV 09/27/2022 93.3  79.6 - 97.8 FL Final    MCH 09/27/2022 29.2  26.1 - 32.9 PG Final    MCHC 09/27/2022 31.3 (A)  31.4 - 35.0 g/dL Final    RDW 09/27/2022 17.2 (A)  11.9 - 14.6 % Final    Platelets 17/54/9459 237  150 - 450 K/uL Final    MPV 09/27/2022 9.2 (A)  9.4 - 12.3 FL Final    nRBC 09/27/2022 0.00  0.0 - 0.2 K/uL Final    **Note: Absolute NRBC parameter is now reported with Hemogram**    Seg Neutrophils 09/27/2022 81 (A)  43 - 78 % Final    Lymphocytes 09/27/2022 10 (A)  13 - 44 % Final    Monocytes 09/27/2022 7  4.0 - 12.0 % Final    Eosinophils % 09/27/2022 0 (A)  0.5 - 7.8 % Final    Basophils 09/27/2022 1  0.0 - 2.0 % Final    Immature Granulocytes 09/27/2022 1  0.0 - 5.0 % Final    Segs Absolute 09/27/2022 4.0  1.7 - 8.2 K/UL Final    Absolute Lymph # 09/27/2022 0.5  0.5 - 4.6 K/UL Final    Absolute Mono # 09/27/2022 0.3  0.1 - 1.3 K/UL Final    Absolute Eos # 09/27/2022 0.0  0.0 - 0.8 K/UL Final    Basophils Absolute 09/27/2022 0.0  0.0 - 0.2 K/UL Final    Absolute Immature Granulocyte 09/27/2022 0.0  0.0 - 0.5 K/UL Final    Differential Type 09/27/2022 AUTOMATED    Final     Treatment Summary has been discussed and given to patient: no    -------------------------------------------------------------------------------------------------------------------  Please call our office at [phone] if you have any  of the following symptoms:   Fever of 100.5 or greater  Chills  Shortness of breath  Swelling or pain in one leg    After office hours an answering service is available and will contact a provider for emergencies or if you are experiencing any of the above symptoms. Patient does express an interest in My Chart. My Chart log in information explained on the after visit summary printout at the [facility] Krystle Salomon [address] desk. Minoo Reina RN  Solid Tumor Nurse Navigator  [phone] cell phone   For Urgent Matters (after hours and weekends), please call Triage at [phone]. For Emergencies, please call 911.

## 2022-09-27 NOTE — TELEPHONE ENCOUNTER
I called pt and told her that dr Murdock Agent only had concern about ldl and they can discuss issues oct 18th at her appt.

## 2022-09-28 ENCOUNTER — HOSPITAL ENCOUNTER (OUTPATIENT)
Dept: RADIATION ONCOLOGY | Age: 72
Setting detail: RECURRING SERIES
Discharge: HOME | End: 2022-10-01
Payer: MEDICARE

## 2022-09-28 ENCOUNTER — HOSPITAL ENCOUNTER (OUTPATIENT)
Dept: INFUSION THERAPY | Age: 72
Discharge: HOME | End: 2022-09-28
Payer: MEDICARE

## 2022-09-28 VITALS
RESPIRATION RATE: 16 BRPM | TEMPERATURE: 97.8 F | WEIGHT: 135 LBS | OXYGEN SATURATION: 98 % | BODY MASS INDEX: 21.96 KG/M2 | HEART RATE: 72 BPM | DIASTOLIC BLOOD PRESSURE: 78 MMHG | SYSTOLIC BLOOD PRESSURE: 134 MMHG

## 2022-09-28 DIAGNOSIS — C34.90 NON-SMALL CELL LUNG CANCER, UNSPECIFIED LATERALITY (HCC): Primary | ICD-10-CM

## 2022-09-28 PROCEDURE — 2500000003 HC RX 250 WO HCPCS: Performed by: INTERNAL MEDICINE

## 2022-09-28 PROCEDURE — 6360000002 HC RX W HCPCS: Performed by: INTERNAL MEDICINE

## 2022-09-28 PROCEDURE — 96375 TX/PRO/DX INJ NEW DRUG ADDON: CPT

## 2022-09-28 PROCEDURE — 96413 CHEMO IV INFUSION 1 HR: CPT

## 2022-09-28 PROCEDURE — A4216 STERILE WATER/SALINE, 10 ML: HCPCS | Performed by: INTERNAL MEDICINE

## 2022-09-28 PROCEDURE — 2580000003 HC RX 258: Performed by: INTERNAL MEDICINE

## 2022-09-28 PROCEDURE — 96417 CHEMO IV INFUS EACH ADDL SEQ: CPT

## 2022-09-28 PROCEDURE — 77386 HC NTSTY MODUL RAD TX DLVR CPLX: CPT

## 2022-09-28 RX ORDER — SODIUM CHLORIDE 9 MG/ML
5-250 INJECTION, SOLUTION INTRAVENOUS PRN
Status: DISCONTINUED | OUTPATIENT
Start: 2022-09-28 | End: 2022-09-29 | Stop reason: HOSPADM

## 2022-09-28 RX ORDER — DIPHENHYDRAMINE HYDROCHLORIDE 50 MG/ML
50 INJECTION INTRAMUSCULAR; INTRAVENOUS ONCE
Status: COMPLETED | OUTPATIENT
Start: 2022-09-28 | End: 2022-09-28

## 2022-09-28 RX ORDER — SODIUM CHLORIDE 0.9 % (FLUSH) 0.9 %
5-40 SYRINGE (ML) INJECTION PRN
Status: DISCONTINUED | OUTPATIENT
Start: 2022-09-28 | End: 2022-09-29 | Stop reason: HOSPADM

## 2022-09-28 RX ORDER — ONDANSETRON 2 MG/ML
8 INJECTION INTRAMUSCULAR; INTRAVENOUS ONCE
Status: COMPLETED | OUTPATIENT
Start: 2022-09-28 | End: 2022-09-28

## 2022-09-28 RX ADMIN — SODIUM CHLORIDE, PRESERVATIVE FREE 10 ML: 5 INJECTION INTRAVENOUS at 09:50

## 2022-09-28 RX ADMIN — ONDANSETRON 8 MG: 2 INJECTION INTRAMUSCULAR; INTRAVENOUS at 10:05

## 2022-09-28 RX ADMIN — PACLITAXEL 72 MG: 6 INJECTION, SOLUTION, CONCENTRATE INTRAVENOUS at 11:12

## 2022-09-28 RX ADMIN — SODIUM CHLORIDE, PRESERVATIVE FREE 10 ML: 5 INJECTION INTRAVENOUS at 12:59

## 2022-09-28 RX ADMIN — SODIUM CHLORIDE 25 ML/HR: 9 INJECTION, SOLUTION INTRAVENOUS at 09:55

## 2022-09-28 RX ADMIN — CARBOPLATIN 157.6 MG: 10 INJECTION, SOLUTION INTRAVENOUS at 12:15

## 2022-09-28 RX ADMIN — FAMOTIDINE 20 MG: 10 INJECTION, SOLUTION INTRAVENOUS at 10:02

## 2022-09-28 RX ADMIN — DEXAMETHASONE SODIUM PHOSPHATE 12 MG: 4 INJECTION, SOLUTION INTRAMUSCULAR; INTRAVENOUS at 10:26

## 2022-09-28 RX ADMIN — DIPHENHYDRAMINE HYDROCHLORIDE 50 MG: 50 INJECTION, SOLUTION INTRAMUSCULAR; INTRAVENOUS at 10:07

## 2022-09-28 ASSESSMENT — PAIN SCALES - GENERAL: PAINLEVEL_OUTOF10: 3

## 2022-09-28 ASSESSMENT — PAIN DESCRIPTION - DESCRIPTORS: DESCRIPTORS: ACHING

## 2022-09-28 ASSESSMENT — PAIN DESCRIPTION - ORIENTATION: ORIENTATION: LOWER

## 2022-09-28 ASSESSMENT — PAIN DESCRIPTION - FREQUENCY: FREQUENCY: CONTINUOUS

## 2022-09-28 ASSESSMENT — PAIN DESCRIPTION - ONSET: ONSET: ON-GOING

## 2022-09-28 ASSESSMENT — PAIN - FUNCTIONAL ASSESSMENT: PAIN_FUNCTIONAL_ASSESSMENT: PREVENTS OR INTERFERES SOME ACTIVE ACTIVITIES AND ADLS

## 2022-09-28 ASSESSMENT — PAIN DESCRIPTION - PAIN TYPE: TYPE: CHRONIC PAIN

## 2022-09-28 ASSESSMENT — PAIN DESCRIPTION - LOCATION: LOCATION: BACK

## 2022-09-28 NOTE — PROGRESS NOTES
Arrived to the [facility]. Taxol and Carboplatin completed. Patient tolerated well. Any issues or concerns during appointment: none. Patient aware of next infusion appointment on 10/5/22 (date) at 56 (time). Patient aware of next lab and [facility] office visit on 10/5/22 (date) at 0900 (time). Patient instructed to call provider with temperature of 100.4 or greater or nausea/vomiting/ diarrhea or pain not controlled by medications  Discharged via wheelchair accompanied by friend.

## 2022-09-29 ENCOUNTER — HOSPITAL ENCOUNTER (OUTPATIENT)
Dept: RADIATION ONCOLOGY | Age: 72
Setting detail: RECURRING SERIES
Discharge: HOME | End: 2022-10-02
Payer: MEDICARE

## 2022-09-29 PROCEDURE — 77386 HC NTSTY MODUL RAD TX DLVR CPLX: CPT

## 2022-09-30 ENCOUNTER — HOSPITAL ENCOUNTER (OUTPATIENT)
Dept: RADIATION ONCOLOGY | Age: 72
Setting detail: RECURRING SERIES
Discharge: HOME | End: 2022-10-03
Payer: MEDICARE

## 2022-09-30 PROCEDURE — 77386 HC NTSTY MODUL RAD TX DLVR CPLX: CPT

## 2022-10-03 ENCOUNTER — HOSPITAL ENCOUNTER (OUTPATIENT)
Dept: RADIATION ONCOLOGY | Age: 72
Setting detail: RECURRING SERIES
Discharge: HOME | End: 2022-10-06
Payer: MEDICARE

## 2022-10-03 PROCEDURE — 77386 HC NTSTY MODUL RAD TX DLVR CPLX: CPT

## 2022-10-03 NOTE — PROGRESS NOTES
Patient: Maya Phillips MRN: [id number]  SSN: [id number]    YOB: 1950  Age: 67 y.o. Sex: female      DIAGNOSIS:  Stage III NSCLC of the JHONY    TREATMENT SITE:  Lungs    DOSE and FRACTIONATION:  19 of 30 fractions; 3800 of 6000cGy    INTERVAL HISTORY:  Maya Phillips is a 67 y.o. female being treated for Stage III NSCLC of the JHONY. Week 1: No complaints. Week 2: Mild esophagitis. Week 3: Symptoms stable. Week 4: Increased esophagitis. Intermittent shooting R shoulder pain. OBJECTIVE:  NAD  There were no vitals filed for this visit. Lab Results   Component Value Date/Time     09/27/2022 09:13 AM    K 4.5 09/27/2022 09:13 AM     09/27/2022 09:13 AM    CO2 29 09/27/2022 09:13 AM    BUN 16 09/27/2022 09:13 AM    GFRAA >60 09/27/2022 09:13 AM    MG 2.1 09/27/2022 09:13 AM    GLOB 3.9 09/27/2022 09:13 AM    ALT 27 09/27/2022 09:13 AM     Lab Results   Component Value Date/Time    WBC 4.9 09/27/2022 09:13 AM    HGB 10.9 09/27/2022 09:13 AM    HCT 34.8 09/27/2022 09:13 AM     09/27/2022 09:13 AM     ASSESSMENT and PLAN:  Maya Phillips is tolerating radiation as anticipated for the current dose and fraction. We will continue on as planned with another treatment visit anticipated next week. - Continue Tylenol ES three times daily, may increase to 4 times daily  - Taking Tramadol 50mg TID prescribed by Ailyn Abad. She will contact this provider to discuss increasing to 100mg TID given she has an existing script from her. She will contact our office if she needs us to send the prescription.  - R shoulder pain likely positional from treatment, continue Tylenol and increase Tramadol prior to daily treatments.      Anni Paulino MD   October 3, 2022

## 2022-10-04 ENCOUNTER — TELEPHONE (OUTPATIENT)
Dept: PULMONOLOGY | Age: 72
End: 2022-10-04

## 2022-10-04 ENCOUNTER — HOSPITAL ENCOUNTER (OUTPATIENT)
Dept: RADIATION ONCOLOGY | Age: 72
Setting detail: RECURRING SERIES
Discharge: HOME | End: 2022-10-07
Payer: MEDICARE

## 2022-10-04 DIAGNOSIS — R07.89 OTHER CHEST PAIN: Primary | ICD-10-CM

## 2022-10-04 DIAGNOSIS — R91.8 PULMONARY MASS: ICD-10-CM

## 2022-10-04 DIAGNOSIS — T66.XXXA RADIATION THERAPY COMPLICATION, INITIAL ENCOUNTER: ICD-10-CM

## 2022-10-04 DIAGNOSIS — Z51.5 PALLIATIVE CARE PATIENT: ICD-10-CM

## 2022-10-04 DIAGNOSIS — J44.9 COPD, VERY SEVERE (HCC): ICD-10-CM

## 2022-10-04 DIAGNOSIS — J96.21 ACUTE ON CHRONIC RESPIRATORY FAILURE WITH HYPOXIA (HCC): ICD-10-CM

## 2022-10-04 PROCEDURE — 77386 HC NTSTY MODUL RAD TX DLVR CPLX: CPT

## 2022-10-04 PROCEDURE — 77336 RADIATION PHYSICS CONSULT: CPT

## 2022-10-04 RX ORDER — TRAMADOL HYDROCHLORIDE 100 MG/1
1 TABLET, COATED ORAL EVERY 6 HOURS
Qty: 120 TABLET | Refills: 2 | Status: SHIPPED | OUTPATIENT
Start: 2022-10-04 | End: 2022-10-04

## 2022-10-04 RX ORDER — TRAMADOL HYDROCHLORIDE 50 MG/1
100 TABLET ORAL 4 TIMES DAILY
Qty: 240 TABLET | Refills: 2 | Status: SHIPPED | OUTPATIENT
Start: 2022-10-04 | End: 2023-01-02

## 2022-10-04 NOTE — TELEPHONE ENCOUNTER
Received call from MsDiallo Rafi Amador regarding chest pain associated with her ongoing radiation treatments. She had been using Tramadol 50mg but the pain was not being well controlled. After discussion, placed RX for Tramadol 100mg to be used every 6 hours to control pain. Patient reports tolerating her cancer treatments good at this point. Continue to assist in her care and support through symptom management.

## 2022-10-05 ENCOUNTER — CLINICAL DOCUMENTATION (OUTPATIENT)
Dept: ONCOLOGY | Age: 72
End: 2022-10-05

## 2022-10-05 ENCOUNTER — HOSPITAL ENCOUNTER (OUTPATIENT)
Dept: INFUSION THERAPY | Age: 72
Discharge: HOME | End: 2022-10-05
Payer: MEDICARE

## 2022-10-05 ENCOUNTER — OFFICE VISIT (OUTPATIENT)
Dept: ONCOLOGY | Age: 72
End: 2022-10-05
Payer: MEDICARE

## 2022-10-05 ENCOUNTER — HOSPITAL ENCOUNTER (OUTPATIENT)
Dept: RADIATION ONCOLOGY | Age: 72
Setting detail: RECURRING SERIES
Discharge: HOME | End: 2022-10-08
Payer: MEDICARE

## 2022-10-05 VITALS
TEMPERATURE: 97.7 F | BODY MASS INDEX: 21.6 KG/M2 | DIASTOLIC BLOOD PRESSURE: 61 MMHG | OXYGEN SATURATION: 98 % | RESPIRATION RATE: 21 BRPM | HEART RATE: 70 BPM | SYSTOLIC BLOOD PRESSURE: 135 MMHG | WEIGHT: 134.4 LBS | HEIGHT: 66 IN

## 2022-10-05 DIAGNOSIS — C34.90 NON-SMALL CELL LUNG CANCER, UNSPECIFIED LATERALITY (HCC): Primary | ICD-10-CM

## 2022-10-05 DIAGNOSIS — R13.10 ODYNOPHAGIA: ICD-10-CM

## 2022-10-05 DIAGNOSIS — Z79.899 HIGH RISK MEDICATION USE: ICD-10-CM

## 2022-10-05 DIAGNOSIS — C34.90 NON-SMALL CELL LUNG CANCER, UNSPECIFIED LATERALITY (HCC): ICD-10-CM

## 2022-10-05 DIAGNOSIS — R11.0 NAUSEA: ICD-10-CM

## 2022-10-05 DIAGNOSIS — R53.83 FATIGUE DUE TO TREATMENT: ICD-10-CM

## 2022-10-05 LAB
ALBUMIN SERPL-MCNC: 3.4 G/DL (ref 3.2–4.6)
ALBUMIN/GLOB SERPL: 1 {RATIO} (ref 1.2–3.5)
ALP SERPL-CCNC: 49 U/L (ref 50–136)
ALT SERPL-CCNC: 26 U/L (ref 12–65)
ANION GAP SERPL CALC-SCNC: 5 MMOL/L (ref 4–13)
AST SERPL-CCNC: 23 U/L (ref 15–37)
BASOPHILS # BLD: 0 K/UL (ref 0–0.2)
BASOPHILS NFR BLD: 1 % (ref 0–2)
BILIRUB SERPL-MCNC: 0.3 MG/DL (ref 0.2–1.1)
BUN SERPL-MCNC: 15 MG/DL (ref 8–23)
CALCIUM SERPL-MCNC: 8.6 MG/DL (ref 8.3–10.4)
CHLORIDE SERPL-SCNC: 103 MMOL/L (ref 101–110)
CO2 SERPL-SCNC: 29 MMOL/L (ref 21–32)
CREAT SERPL-MCNC: 0.7 MG/DL (ref 0.6–1)
DIFFERENTIAL METHOD BLD: ABNORMAL
EOSINOPHIL # BLD: 0 K/UL (ref 0–0.8)
EOSINOPHIL NFR BLD: 0 % (ref 0.5–7.8)
ERYTHROCYTE [DISTWIDTH] IN BLOOD BY AUTOMATED COUNT: 17.6 % (ref 11.9–14.6)
GLOBULIN SER CALC-MCNC: 3.3 G/DL (ref 2.3–3.5)
GLUCOSE SERPL-MCNC: 92 MG/DL (ref 65–100)
HCT VFR BLD AUTO: 30.5 % (ref 35.8–46.3)
HGB BLD-MCNC: 9.6 G/DL (ref 11.7–15.4)
IMM GRANULOCYTES # BLD AUTO: 0 K/UL (ref 0–0.5)
IMM GRANULOCYTES NFR BLD AUTO: 0 % (ref 0–5)
LYMPHOCYTES # BLD: 0.4 K/UL (ref 0.5–4.6)
LYMPHOCYTES NFR BLD: 15 % (ref 13–44)
MAGNESIUM SERPL-MCNC: 2.1 MG/DL (ref 1.8–2.4)
MCH RBC QN AUTO: 29 PG (ref 26.1–32.9)
MCHC RBC AUTO-ENTMCNC: 31.5 G/DL (ref 31.4–35)
MCV RBC AUTO: 92.1 FL (ref 79.6–97.8)
MONOCYTES # BLD: 0.3 K/UL (ref 0.1–1.3)
MONOCYTES NFR BLD: 12 % (ref 4–12)
NEUTS SEG # BLD: 1.7 K/UL (ref 1.7–8.2)
NEUTS SEG NFR BLD: 71 % (ref 43–78)
NRBC # BLD: 0 K/UL (ref 0–0.2)
PLATELET # BLD AUTO: 183 K/UL (ref 150–450)
PMV BLD AUTO: 9.4 FL (ref 9.4–12.3)
POTASSIUM SERPL-SCNC: 3.7 MMOL/L (ref 3.5–5.1)
PROT SERPL-MCNC: 6.7 G/DL (ref 6.3–8.2)
RBC # BLD AUTO: 3.31 M/UL (ref 4.05–5.2)
SODIUM SERPL-SCNC: 137 MMOL/L (ref 136–145)
WBC # BLD AUTO: 2.4 K/UL (ref 4.3–11.1)

## 2022-10-05 PROCEDURE — G8420 CALC BMI NORM PARAMETERS: HCPCS | Performed by: INTERNAL MEDICINE

## 2022-10-05 PROCEDURE — 99215 OFFICE O/P EST HI 40 MIN: CPT | Performed by: INTERNAL MEDICINE

## 2022-10-05 PROCEDURE — 6360000002 HC RX W HCPCS: Performed by: INTERNAL MEDICINE

## 2022-10-05 PROCEDURE — 77386 HC NTSTY MODUL RAD TX DLVR CPLX: CPT

## 2022-10-05 PROCEDURE — 96417 CHEMO IV INFUS EACH ADDL SEQ: CPT

## 2022-10-05 PROCEDURE — 2580000003 HC RX 258: Performed by: INTERNAL MEDICINE

## 2022-10-05 PROCEDURE — 83735 ASSAY OF MAGNESIUM: CPT

## 2022-10-05 PROCEDURE — A4216 STERILE WATER/SALINE, 10 ML: HCPCS | Performed by: INTERNAL MEDICINE

## 2022-10-05 PROCEDURE — 96375 TX/PRO/DX INJ NEW DRUG ADDON: CPT

## 2022-10-05 PROCEDURE — 36591 DRAW BLOOD OFF VENOUS DEVICE: CPT

## 2022-10-05 PROCEDURE — 1123F ACP DISCUSS/DSCN MKR DOCD: CPT | Performed by: INTERNAL MEDICINE

## 2022-10-05 PROCEDURE — 2500000003 HC RX 250 WO HCPCS: Performed by: INTERNAL MEDICINE

## 2022-10-05 PROCEDURE — 3017F COLORECTAL CA SCREEN DOC REV: CPT | Performed by: INTERNAL MEDICINE

## 2022-10-05 PROCEDURE — 1090F PRES/ABSN URINE INCON ASSESS: CPT | Performed by: INTERNAL MEDICINE

## 2022-10-05 PROCEDURE — 96413 CHEMO IV INFUSION 1 HR: CPT

## 2022-10-05 PROCEDURE — 85025 COMPLETE CBC W/AUTO DIFF WBC: CPT

## 2022-10-05 PROCEDURE — 1036F TOBACCO NON-USER: CPT | Performed by: INTERNAL MEDICINE

## 2022-10-05 PROCEDURE — G8484 FLU IMMUNIZE NO ADMIN: HCPCS | Performed by: INTERNAL MEDICINE

## 2022-10-05 PROCEDURE — G8400 PT W/DXA NO RESULTS DOC: HCPCS | Performed by: INTERNAL MEDICINE

## 2022-10-05 PROCEDURE — 80053 COMPREHEN METABOLIC PANEL: CPT

## 2022-10-05 PROCEDURE — G8427 DOCREV CUR MEDS BY ELIG CLIN: HCPCS | Performed by: INTERNAL MEDICINE

## 2022-10-05 RX ORDER — SODIUM CHLORIDE 0.9 % (FLUSH) 0.9 %
10 SYRINGE (ML) INJECTION PRN
Status: DISCONTINUED | OUTPATIENT
Start: 2022-10-05 | End: 2022-10-06 | Stop reason: HOSPADM

## 2022-10-05 RX ORDER — SODIUM CHLORIDE 9 MG/ML
5-250 INJECTION, SOLUTION INTRAVENOUS PRN
Status: DISCONTINUED | OUTPATIENT
Start: 2022-10-05 | End: 2022-10-06 | Stop reason: HOSPADM

## 2022-10-05 RX ORDER — SODIUM CHLORIDE 0.9 % (FLUSH) 0.9 %
5-40 SYRINGE (ML) INJECTION PRN
Status: DISCONTINUED | OUTPATIENT
Start: 2022-10-05 | End: 2022-10-06 | Stop reason: HOSPADM

## 2022-10-05 RX ORDER — DIPHENHYDRAMINE HYDROCHLORIDE 50 MG/ML
50 INJECTION INTRAMUSCULAR; INTRAVENOUS ONCE
Status: COMPLETED | OUTPATIENT
Start: 2022-10-05 | End: 2022-10-05

## 2022-10-05 RX ORDER — ONDANSETRON 2 MG/ML
8 INJECTION INTRAMUSCULAR; INTRAVENOUS ONCE
Status: COMPLETED | OUTPATIENT
Start: 2022-10-05 | End: 2022-10-05

## 2022-10-05 RX ADMIN — SODIUM CHLORIDE, PRESERVATIVE FREE 10 ML: 5 INJECTION INTRAVENOUS at 09:33

## 2022-10-05 RX ADMIN — FAMOTIDINE 20 MG: 10 INJECTION, SOLUTION INTRAVENOUS at 10:38

## 2022-10-05 RX ADMIN — CARBOPLATIN 187.8 MG: 10 INJECTION, SOLUTION INTRAVENOUS at 12:30

## 2022-10-05 RX ADMIN — PACLITAXEL 72 MG: 6 INJECTION, SOLUTION, CONCENTRATE INTRAVENOUS at 11:30

## 2022-10-05 RX ADMIN — ONDANSETRON 8 MG: 2 INJECTION INTRAMUSCULAR; INTRAVENOUS at 10:40

## 2022-10-05 RX ADMIN — SODIUM CHLORIDE, PRESERVATIVE FREE 10 ML: 5 INJECTION INTRAVENOUS at 13:05

## 2022-10-05 RX ADMIN — SODIUM CHLORIDE, PRESERVATIVE FREE 10 ML: 5 INJECTION INTRAVENOUS at 10:34

## 2022-10-05 RX ADMIN — SODIUM CHLORIDE 50 ML/HR: 9 INJECTION, SOLUTION INTRAVENOUS at 10:35

## 2022-10-05 RX ADMIN — DIPHENHYDRAMINE HYDROCHLORIDE 50 MG: 50 INJECTION, SOLUTION INTRAMUSCULAR; INTRAVENOUS at 10:42

## 2022-10-05 RX ADMIN — DEXAMETHASONE SODIUM PHOSPHATE 12 MG: 4 INJECTION, SOLUTION INTRAMUSCULAR; INTRAVENOUS at 10:45

## 2022-10-05 ASSESSMENT — PATIENT HEALTH QUESTIONNAIRE - PHQ9
SUM OF ALL RESPONSES TO PHQ QUESTIONS 1-9: 0
2. FEELING DOWN, DEPRESSED OR HOPELESS: 0
SUM OF ALL RESPONSES TO PHQ9 QUESTIONS 1 & 2: 0
SUM OF ALL RESPONSES TO PHQ QUESTIONS 1-9: 0
1. LITTLE INTEREST OR PLEASURE IN DOING THINGS: 0

## 2022-10-05 NOTE — PATIENT INSTRUCTIONS
0.0  0.0 - 0.2 K/UL Final    Absolute Immature Granulocyte 10/05/2022 0.0  0.0 - 0.5 K/UL Final     Treatment Summary has been discussed and given to patient: no  -------------------------------------------------------------------------------------------------------------------  Please call our office at [phone] if you have any  of the following symptoms:   Fever of 100.5 or greater  Chills  Shortness of breath  Swelling or pain in one leg    After office hours an answering service is available and will contact a provider for emergencies or if you are experiencing any of the above symptoms. Patient does express an interest in My Chart. My Chart log in information explained on the after visit summary printout at the . K[address]le Sal[address] desk. Jaylon Gibbons RN  Solid Tumor Nurse Navigator  [phone] cell phone   For Urgent Matters (after hours and weekends), please call Triage at [phone]. For Emergencies, please call 911.

## 2022-10-05 NOTE — PROGRESS NOTES
10/5/2022  Pt to Infusion without complaints. Labs reviewed and pt received treatment per order, tolerated well. Patient instructed to call provider with temperature of 100.4 or greater, nausea/vomiting/diarrhea/pain not controlled by medications, or any other issues/concerns. No follow up with Infusion at this time. Discharged home with family assistance.

## 2022-10-05 NOTE — PROGRESS NOTES
10/5/2022 - Pt seen by Dr. Caesar Shaver for c5 Carbo/Taxol. Pt reported \"feeling well\". Approved to get dental work done after completion of chemotherapy. MMW is working well. Nausea this morning only. Recommended to take nausea meds @ home PRN. Survival rate discussed, per pt's request. Labs reviewed; OK to proceed to infusion. Encouraged to call with questions. Navigation will continue to follow.

## 2022-10-05 NOTE — PROGRESS NOTES
Patient arrived to port lab for port access and lab draw. Port accessed and labs drawn per protocol. *Port remains accessed. Patient discharged from port lab via wheelchair with spouse.

## 2022-10-05 NOTE — PROGRESS NOTES
3 [facility] Hematology & Oncology: Office Visit Progress Note    Chief Complaint:    Non-small cell lung cancer stage III    History of Present Illness:  Reason for Referral:  Non-small cell lung cancer     Referring Provider:  Dr. Gerry Arredondo, [facility] Pulmonary and Critical Care     Primary Care Provider:  Dr. Lelo Guo, [facility])     Family History of Cancer/Hematologic Disorders:  No documented family history     Presenting Symptoms:   Presented to [facility] ED with COPD exacerbation     Ms. Radha Sapp is a 67 y.o.  female with a medical history of COPD, asthma, oxygen dependent, CAD, closed right hip and femur fracture (2/22), subdural hematoma (3/22), MI, and thyroid disease. Surgical history includes bilateral cataract removal, right hip surgery, and right carotid endarterectomy. She is a former smoker of 50 years but quit in 2017. Denies alcohol use. On 6/9/22 patient presented to the [facility] ED with increased shortness of breath over several days. Admitted with exacerbation of COPD. CXR showed unchanged consolidation of left lower lobe lung. On 6/11 CT Chest PE protocol was performed which revealed no PE but a increase in the left lower lobe lung mass. She was discharged home on 6/12 with instruction to follow up with pulmonary within 1 week. On 6/24/22 she had a PET scan which showed known lung mass and left hilar and subcarinal mediastinal activity as well. On 6/28/22 she was seen in follow up with pulmonary. PET imaging reviewed. Recommended EBUS. Procedure was performed on 7/20/22. Pathology showed malignant cells 11L lymph node. Referral to medical and radiation oncology for evaluation and treatment recommendations.       MRI Brain scheduled on 8/2 and radiation oncology consult on 7/29.       6/9/22 [facility] ED CXR  FINDINGS: The lungs are hyperexpanded, consistent with emphysematous changes on the prior CT chest. Areas of nodular consolidation in the left lower lobe are unchanged. No acute infiltrate or pulmonary edema is seen. The heart size, mediastinal contour and pulmonary vasculature are normal. There are coronary artery calcifications or stents. The thorax or pleural effusion is seen. IMPRESSION:  1. Emphysema. 2. Coronary artery disease. 3. Unchanged nodular consolidation in the left lower lobe. 6/11/22 [facility] CT Chest PE protocol  Findings: There is no pleural or pericardial effusion. The heart and great vessels are unremarkable. There are no filling defects within the pulmonary arteries to suggest pulmonary emboli. Moderate emphysematous changes are present. Patchy interstitial opacity is present within the right upper lobe  anteriorly. Similar but less impressive findings are newly visualized within the left upper lobe anteriorly. Again noted is the ill-defined masslike opacity within the left lower lobe measuring approximately 2.0 x 3.6 cm previously measured at approximately 1.7 x 2.8 cm. Also is a ground glass opacity within the left upper lobe measuring 1.5 cm, unchanged when remeasured on the previous study. A left hilar lymph node  measures 1.4 cm in short axis, unchanged. There is a small left Bochdalek hernia containing fat. Imaging of the upper abdomen is unremarkable. There are compression fractures within the thoracic and lumbar spine with mild height loss. The fractures at T10 and T11 has occurred in the interim. IMPRESSION:  1. No evidence of pulmonary embolism. 2. Interval increase in left lower lobe mass suspicious for bronchogenic carcinoma. A left hilar lymph node also is enlarged concerning for metastatic adenopathy. 3. Newly visualized interstitial opacities within the upper lobes, right greater than left, most likely infectious. 4. Stable left lower lobe groundglass opacity. 5.  Interval compression fractures with mild height loss at T10 and T11.     6/24/22 [facility] PET  FINDINGS:  NECK/CHEST:  No suspicious activity is seen in the neck. Only minimal nonfocal activity is seen about left posterior facets in the mid cervical spine likely related to benign degenerative changes. No clear aggressive osseous lesion is suggested on limited CT images. The most clear suspicious activity is seen associated with a large heterogeneous lesion in the left lower lobe demonstrating spiculated appearing components best appreciated on axial image 92 measuring 4.4 cm x 2.3 cm in greatest transverse dimensions and with a maximum SUV of 11.2 g/mL. Malignancy is not excluded given the appearance. Additional focal appearing activity is seen in the left hilum on  pet/CT image 85 with a maximum SUV of 9.1 g/mL and in the subcarinal mediastinum on axial image 84 with a maximum SUV of 6.3 g/mL concerning for left hilar and subcarinal mediastinal diego metastases, respectively. Additional mild activity is seen in the mediastinum and left hilum on pet/CT image 76 which does appear to correspond to small prevascular and left hilar lymph nodes seen on axial CT  image 39 of the prior the chest dated 6/11/2022 which are suspicious for additional early diego metastases. No clearly suspicious activity is otherwise seen in the chest. Mild to moderate activity is seen in the medial right upper lobe on pet/CT image 56 with a maximum SUV of 4.8 g/mL. Although the degree of activity is suspicious, this appears to correspond to a fluid containing cystic structure in the right upper lobe with a CT appearance being more suggestive of an inflammatory process. Additional soft tissue activity is seen over the lateral scapula which is most consistent with benign muscle activity. ABDOMEN/PELVIS:  The only potentially suspicious activity is skin activity involving the left  proximal medial thigh seen on pet/CT image 257 with a maximum SUV of 6.3 g/mL which appears to be associated with focal skin thickening.  This is not felt to be related to the patient's lung cancer. However, a dermal neoplasm or focal  inflammatory process is not excluded given the appearance. This should be further assessed with direct visualization. No suspicious activity is otherwise seen below the diaphragms. Activity is seen adjacent to the right femoral neck. However, this appears to correspond to heterotopic bone formation from hip  surgery at this level without aggressive features suggested therefore is not worrisome. No focal hepatic activity is seen. No contour deforming, or hypermetabolic adrenal lesion is seen. Advanced atherosclerotic calcification is seen of the abdominal aorta. IMPRESSION:  1. Heterogeneous left lower lobe mass demonstrating irregular spiculated components and measuring 4.4 cm x 2.3 cm in greatest transverse dimensions and with a maximum SUV of 11.2 g/mL. Malignancy is not excluded given the appearance. Further evaluation as clinically indicated is recommended. 2. Additional left hilar and subcarinal mediastinal activity concerning for diego metastases. Additional mild activity is seen within a prevascular mediastinal lymph node an additional left hilar lymph nodes for which additional early diego metastases are not excluded. 3. Focal activity associated with focal skin thickening in the most medial, proximal left thigh seen on pet/CT image 257. This is unlikely to be related to potential lung cancer although could represent an additional dermal neoplasm or focal abnormal inflammatory process otherwise. This should be clinically  evaluated with direct visualization. This has been marked on image to assist with clinical correlation. 4. Mild activity associated with a cystic abnormality in the medial right upper lobe. Although the degree of activity is suspicious (maximum SUV of 4.8 g/mL) the CT features are more consistent with a potential inflammatory process. Attention on follow-up studies is recommended. 7/20/22 [facility] Pathology EBUS  DIAGNOSIS  11L Lymph Node, Fine Needle Aspiration:      MALIGNANT CELLS PRESENT. Cell block: Malignant cells present. Immunohistochemical findings will follow in an addendum. Notes from Referring Provider:  None     Other Pertinent Information:  Covid vaccination - 1/28/21, 2/18/21, and 9/25/21     Radiation oncology consult with Dr. Levi Finn scheduled for 7/29/22 at 1 pm.     MRI Brain is scheduled for 8/2/22 at 8 am.         Review of Systems:  Constitutional Fatigue. Denies fever or chills. Denies weight loss or appetite changes. Denies anorexia. HEENT Denies trauma, bluring vision, hearing loss, ear pain, nosebleeds, sore throat, neck pain and ear discharge. Skin Denies lesions or rashes. Lungs Denies shortness of breath, cough, sputum production or hemoptysis. Cardiovascular Denies chest pain, palpitations, orthopnea, claudication and leg swelling. Gastrointestinal Odynophagia. Denies nausea, vomiting, bowel changes. Denies bloody or black stools. Denies abdominal pain.  Denies dysuria, frequency or hesitancy of urination   Neuro Denies headaches, visual changes or ataxia. Denies dizziness, tingling, tremors, sensory change, speech change, focal weakness and headaches. Hematology Denies nasal/gum bleeding, denies easy bruise   Endo Denies heat/cold intolerance, denies diabetes. MSK Denies back pain, swollen legs, myalgias and falls. Psychiatric/Behavioral Denies depression and substance abuse. The patient is not nervous/anxious. Allergies   Allergen Reactions    Promethazine Nausea And Vomiting and Other (See Comments)     Severe vomiting      Past Medical History:   Diagnosis Date    Acute respiratory failure with hypoxia (HCC) 08/22/2013    Asthma     CAD (coronary artery disease)     Followed by [facility] Card.     Chest pain 05/31/2019    pt denies any reent CP or increase SOB    Closed right hip fracture ([address] 02/04/2022    COPD with acute lower respiratory infection ([facility] [address] 02/10/2022    severe COPD with centrilobular emphysema, on nocturnal O2.     EKG, abnormal 02/15/2022    Femur fracture, right ([facility] [address] 2022    Former cigarette smoker     H/O heart artery stent     several stents- last stents placed 2022    History of MI (myocardial infarction)     Lung mass     Multiple closed anterior-posterior compression fractures of pelvis ([facility] [address] 2022    Other fracture of right femur, initial encounter for closed fracture ([facility] [address] 2022    Oxygen dependent     2L NC continuous    Post-traumatic subdural hematoma 03/15/2022    SDH (subdural hematoma) 2022    hx of    Thyroid disease      Past Surgical History:   Procedure Laterality Date    BRONCHOSCOPY N/A 2022    BRONCHOSCOPY ENDOBRONCHIAL ULTRASOUND performed by Deepa Eaton MD at [facility] ENDOSCOPY    CAROTID ENDARTERECTOMY Right     CATARACT REMOVAL Bilateral     HIP SURGERY Right     IR PORT PLACEMENT EQUAL OR GREATER THAN 5 YEARS  2022    IR PORT PLACEMENT EQUAL OR GREATER THAN 5 YEARS 2022 [facility] RADIOLOGY SPECIALS    WISDOM TOOTH EXTRACTION      as a teenagers     Family History   Adopted: Yes   Problem Relation Age of Onset    No Known Problems Mother     No Known Problems Father      Social History     Socioeconomic History    Marital status:      Spouse name: Not on file    Number of children: Not on file    Years of education: Not on file    Highest education level: Not on file   Occupational History    Not on file   Tobacco Use    Smoking status: Former     Packs/day: 0.50     Years: 50.00     Pack years: 25.00     Types: Cigarettes     Quit date:      Years since quittin.7    Smokeless tobacco: Never   Substance and Sexual Activity    Alcohol use: Not Currently    Drug use: Not on file    Sexual activity: Not on file   Other Topics Concern    Not on file   Social History Narrative    Not on file     Social Determinants of Health     Financial Resource Strain: Not on file   Food Insecurity: Not on file   Transportation Needs: Not on file   Physical Activity: Not on file   Stress: Not on file   Social Connections: Not on file   Intimate Partner Violence: Not on file   Housing Stability: Not on file     Current Outpatient Medications   Medication Sig Dispense Refill    traMADol (ULTRAM) 50 MG tablet Take 2 tablets by mouth 4 times daily for 90 days. Z51.5 Palliative Care 240 tablet 2    Magic Mouthwash (MIRACLE MOUTHWASH) Swish and swallow 5 mLs 4 times daily as needed for Irritation Equal parts viscous lidocaine, maalox, nystatin, benadryl 480 mL 1    predniSONE (DELTASONE) 5 MG tablet TAKE 1 TABLET BY MOUTH TWICE DAILY      albuterol (PROVENTIL) (2.5 MG/3ML) 0.083% nebulizer solution TAKE 3 ML BY NEBULIZATION EVERY 6 HOURS AS NEEDED FOR SHORTNESS OF BREATH      tiotropium (SPIRIVA RESPIMAT) 2.5 MCG/ACT AERS inhaler Inhale 2 puffs into the lungs in the morning. 1 each 11    budesonide-formoterol (SYMBICORT) 160-4.5 MCG/ACT AERO Inhale 2 puffs into the lungs in the morning and 2 puffs before bedtime. 10.2 g 11    OXYGEN Inhale into the lungs 2 LPM      lidocaine-prilocaine (EMLA) 2.5-2.5 % cream Apply topically as needed.  30 g 1    ondansetron (ZOFRAN ODT) 4 MG disintegrating tablet Take 1 tablet by mouth every 8 hours as needed for Nausea or Vomiting 90 tablet 1    prochlorperazine (COMPAZINE) 10 MG tablet Take 1 tablet by mouth every 6 hours as needed (chemo-induced nausea) 90 tablet 2    predniSONE (DELTASONE) 10 MG tablet Take 10 mg by mouth daily (with breakfast)      Pseudoephedrine-guaiFENesin (MUCINEX D PO) Take by mouth in the morning and at bedtime      torsemide (DEMADEX) 10 MG tablet Take 10 mg by mouth daily      acetaminophen (TYLENOL) 325 MG tablet Take by mouth every 4 hours as needed      aspirin 81 MG chewable tablet Take 81 mg by mouth daily      atorvastatin (LIPITOR) 80 MG tablet Take 80 mg by mouth at bedtime TAKE 1 TABLET BY MOUTH DAILY cetirizine (ZYRTEC) 10 MG tablet Take 10 mg by mouth daily as needed      clopidogrel (PLAVIX) 75 MG tablet Take 75 mg by mouth daily      escitalopram (LEXAPRO) 10 MG tablet Take 10 mg by mouth daily      ezetimibe (ZETIA) 10 MG tablet Take 10 mg by mouth at bedtime      methIMAzole (TAPAZOLE) 10 MG tablet Take 10 mg by mouth daily      metoprolol succinate (TOPROL XL) 100 MG extended release tablet Take 100 mg by mouth daily      nitroGLYCERIN (NITROSTAT) 0.4 MG SL tablet Place 0.4 mg under the tongue 3 times daily as needed      pantoprazole (PROTONIX) 40 MG tablet Take 40 mg by mouth 2 times daily      Roflumilast (DALIRESP) 500 MCG tablet Take 500 mcg by mouth nightly      butalbital-acetaminophen-caffeine (FIORICET, ESGIC) -40 MG per tablet Take 1 tablet by mouth every 6 hours as needed (Patient not taking: No sig reported)       No current facility-administered medications for this visit.      Facility-Administered Medications Ordered in Other Visits   Medication Dose Route Frequency Provider Last Rate Last Admin    sodium chloride flush 0.9 % injection 10 mL  10 mL IntraVENous PRN Brock Villalobos MD   10 mL at 10/05/22 0933    0.9 % sodium chloride infusion  5-250 mL/hr IntraVENous PRN Brock Villalobos MD        dexamethasone (DECADRON) 12 mg in sodium chloride 0.9 % 50 mL IVPB  12 mg IntraVENous Once Brock Villalobos MD        ondansetron [facility]) injection 8 mg  8 mg IntraVENous Once Brock Villalobos MD        diphenhydrAMINE (BENADRYL) injection 50 mg  50 mg IntraVENous Once Brock Villalobos MD        famotidine (PEPCID) 20 mg in sodium chloride (PF) 10 mL injection  20 mg IntraVENous Once Brock Villalobos MD        PACLitaxel (TAXOL) 72 mg in sodium chloride 0.9 % 250 mL chemo infusion  45 mg/m2 (Treatment Plan Recorded) IntraVENous Once Brock Villalobos MD        CARBOplatin (PARAPLATIN) 187.8 mg in sodium chloride 0.9 % 250 mL chemo IVPB  187.8 mg IntraVENous Once Brock Villalobos MD sodium chloride flush 0.9 % injection 5-40 mL  5-40 mL IntraVENous REX Serrano MD           OBJECTIVE:  /61 (Site: Right Upper Arm, Position: Sitting, Cuff Size: Medium Adult)   Pulse 70   Temp 97.7 °F (36.5 °C) (Oral)   Resp 21   Ht 5' 5.75\" (1.67 m)   Wt 134 lb 6.4 oz (61 kg)   SpO2 98%   BMI 21.86 kg/m²     Physical Exam:  Constitutional: Oriented to person, place, and time. Well-developed and well-nourished. HEENT: Normocephalic and atraumatic. Oropharynx is clear and moist.   Conjunctivae and EOM are normal. Pupils are equal, round, and reactive to light. No scleral icterus. Neck supple. No JVD present. No tracheal deviation present. No thyromegaly present. Lymph node No palpable submandibular, cervical, supraclavicular, axillary and inguinal lymph nodes. Skin Warm and dry. No bruising and no rash noted. No erythema. No pallor. Respiratory Effort normal and breath sounds normal.  No respiratory distress. No wheezes. No rales. No tenderness. CVS Normal rate, regular rhythm and normal heart sounds. Exam reveals no gallop, no friction and no rub. No murmur heard. Abdomen Soft. Bowel sounds are normal. Exhibits no distension. There is no tenderness. There is no rebound and no guarding. Neuro Normal reflexes. No cranial nerve deficit. Exhibits normal muscle tone, 5 of 5 strength of all extremities. MSK Normal range of motion in general.  No edema and no tenderness.    Psych Normal mood, affect, behavior, judgment and thought content      Labs:  Recent Results (from the past 24 hour(s))   Magnesium    Collection Time: 10/05/22  9:12 AM   Result Value Ref Range    Magnesium 2.1 1.8 - 2.4 mg/dL   Comprehensive Metabolic Panel    Collection Time: 10/05/22  9:12 AM   Result Value Ref Range    Sodium 137 136 - 145 mmol/L    Potassium 3.7 3.5 - 5.1 mmol/L    Chloride 103 101 - 110 mmol/L    CO2 29 21 - 32 mmol/L    Anion Gap 5 4 - 13 mmol/L    Glucose 92 65 - 100 mg/dL BUN 15 8 - 23 MG/DL    Creatinine 0.70 0.6 - 1.0 MG/DL    Est, Glom Filt Rate >60 >60 ml/min/1.73m2    Calcium 8.6 8.3 - 10.4 MG/DL    Total Bilirubin 0.3 0.2 - 1.1 MG/DL    ALT 26 12 - 65 U/L    AST 23 15 - 37 U/L    Alk Phosphatase 49 (L) 50 - 136 U/L    Total Protein 6.7 6.3 - 8.2 g/dL    Albumin 3.4 3.2 - 4.6 g/dL    Globulin 3.3 2.3 - 3.5 g/dL    Albumin/Globulin Ratio 1.0 (L) 1.2 - 3.5     CBC with Auto Differential    Collection Time: 10/05/22  9:12 AM   Result Value Ref Range    WBC 2.4 (L) 4.3 - 11.1 K/uL    RBC 3.31 (L) 4.05 - 5.2 M/uL    Hemoglobin 9.6 (L) 11.7 - 15.4 g/dL    Hematocrit 30.5 (L) 35.8 - 46.3 %    MCV 92.1 79.6 - 97.8 FL    MCH 29.0 26.1 - 32.9 PG    MCHC 31.5 31.4 - 35.0 g/dL    RDW 17.6 (H) 11.9 - 14.6 %    Platelets 042 285 - 315 K/uL    MPV 9.4 9.4 - 12.3 FL    nRBC 0.00 0.0 - 0.2 K/uL    Differential Type AUTOMATED      Seg Neutrophils 71 43 - 78 %    Lymphocytes 15 13 - 44 %    Monocytes 12 4.0 - 12.0 %    Eosinophils % 0 (L) 0.5 - 7.8 %    Basophils 1 0.0 - 2.0 %    Immature Granulocytes 0 0.0 - 5.0 %    Segs Absolute 1.7 1.7 - 8.2 K/UL    Absolute Lymph # 0.4 (L) 0.5 - 4.6 K/UL    Absolute Mono # 0.3 0.1 - 1.3 K/UL    Absolute Eos # 0.0 0.0 - 0.8 K/UL    Basophils Absolute 0.0 0.0 - 0.2 K/UL    Absolute Immature Granulocyte 0.0 0.0 - 0.5 K/UL       Imaging:  No results found for this or any previous visit. ASSESSMENT/PLAN:   Diagnosis Orders   1. Non-small cell lung cancer, unspecified laterality ([facility] [address]        2. Fatigue due to treatment        3. High risk medication use        4. Odynophagia        5. Nausea                  67 y.o. F consulted for lung adenocarcinoma presented to [facility] on 7/26/2022 with .   She was a longtime smoker quitted 5 years ago, COPD on home oxygen, recently found left lower lobe 4.4 cm mass and PET showed avid left hilar and subcarinal lymphadenopathy, station 7 lymphadenopathy not accessible on EBUS but biopsy of station 11 L lymph node showed lung adenocarcinoma. We discussed that this was consistent with stage III lung cancer, discussed potential curative intent therapy with concurrent chemoradiation followed by immunotherapy, all questions answered and patient is interested, we will arrange port placement and radiation oncology evaluation, discussed toxicities and management, patient takes Plavix for severe CAD with multiple coronary stents, no absolute contraindication but would need careful management, return on 9/6/2022 and ready to proceed with cycle 1, monitor bruising and platelet carefully, requested to check thyroid function and reportedly being placed on methimazole for supposed hyperthyroidism when she was hospitalized months ago without any follow-ups, check TSH actually elevated and refer to endocrinology to clarify the management, clarified questions regarding seafood, hemoglobin down to 9.9, increased fatigue is multifactorial but will give blood transfusion if desired, odynophagia responded to Magic mouthwash, tramadol as needed, increased nausea control, labs reviewed and proceed to cycle 5, return next week but call as needed. All questions are answered to their satisfaction. They will call for further questions and concerns. ECOG PERFORMANCE STATUS - 1- Restricted in physically strenuous activity but ambulatory and able to carry out work of a light or sedentary nature such as light house work, office work. Pain - 0 - No pain/10. None/Minimal pain - not affecting QOL     Fatigue - No flowsheet data found. Distress - No flowsheet data found. Elements of this note have been dictated via voice recognition software. Text and phrases may be limited by the accuracy and autoconversion of the software. The chart has been reviewed, but errors may still be present. Armani Truong M.D.   [address]  Office : [phone]  Fax : [phone]

## 2022-10-06 ENCOUNTER — HOSPITAL ENCOUNTER (OUTPATIENT)
Dept: RADIATION ONCOLOGY | Age: 72
Setting detail: RECURRING SERIES
Discharge: HOME | End: 2022-10-09
Payer: MEDICARE

## 2022-10-06 DIAGNOSIS — C34.90 NON-SMALL CELL LUNG CANCER, UNSPECIFIED LATERALITY (HCC): Primary | ICD-10-CM

## 2022-10-06 PROCEDURE — 77386 HC NTSTY MODUL RAD TX DLVR CPLX: CPT

## 2022-10-07 ENCOUNTER — HOSPITAL ENCOUNTER (OUTPATIENT)
Dept: RADIATION ONCOLOGY | Age: 72
Setting detail: RECURRING SERIES
Discharge: HOME | End: 2022-10-10
Payer: MEDICARE

## 2022-10-07 PROCEDURE — 77386 HC NTSTY MODUL RAD TX DLVR CPLX: CPT

## 2022-10-10 ENCOUNTER — HOSPITAL ENCOUNTER (OUTPATIENT)
Dept: RADIATION ONCOLOGY | Age: 72
Setting detail: RECURRING SERIES
Discharge: HOME | End: 2022-10-13
Payer: MEDICARE

## 2022-10-10 PROCEDURE — 77386 HC NTSTY MODUL RAD TX DLVR CPLX: CPT

## 2022-10-10 NOTE — PROGRESS NOTES
Patient: Shalini Song MRN: [id number]  SSN: [id number]    YOB: 1950  Age: 67 y.o. Sex: female      DIAGNOSIS:  Stage III NSCLC of the JHONY    TREATMENT SITE:  Lungs    DOSE and FRACTIONATION:  24 of 30 fractions; 4800 of 6000cGy    INTERVAL HISTORY:  Shalini Song is a 67 y.o. female being treated for Stage III NSCLC of the JHONY. Week 1: No complaints. Week 2: Mild esophagitis. Week 3: Symptoms stable. Week 4: Increased esophagitis. Intermittent shooting R shoulder pain. Week 5: Persistent esophagitis, controlled with Tramadol and MMW    OBJECTIVE:  NAD  There were no vitals filed for this visit. Lab Results   Component Value Date/Time     10/05/2022 09:12 AM    K 3.7 10/05/2022 09:12 AM     10/05/2022 09:12 AM    CO2 29 10/05/2022 09:12 AM    BUN 15 10/05/2022 09:12 AM    GFRAA >60 09/27/2022 09:13 AM    MG 2.1 10/05/2022 09:12 AM    GLOB 3.3 10/05/2022 09:12 AM    ALT 26 10/05/2022 09:12 AM     Lab Results   Component Value Date/Time    WBC 2.4 10/05/2022 09:12 AM    HGB 9.6 10/05/2022 09:12 AM    HCT 30.5 10/05/2022 09:12 AM     10/05/2022 09:12 AM     ASSESSMENT and PLAN:  Shalini Song is tolerating radiation as anticipated for the current dose and fraction. We will continue on as planned with another treatment visit anticipated next week.    - Refilled MMW    Inez Eisenmenger, MD   October 10, 2022

## 2022-10-11 ENCOUNTER — HOSPITAL ENCOUNTER (OUTPATIENT)
Dept: RADIATION ONCOLOGY | Age: 72
Setting detail: RECURRING SERIES
Discharge: HOME | End: 2022-10-14
Payer: MEDICARE

## 2022-10-11 PROCEDURE — 77336 RADIATION PHYSICS CONSULT: CPT

## 2022-10-11 PROCEDURE — 77386 HC NTSTY MODUL RAD TX DLVR CPLX: CPT

## 2022-10-12 ENCOUNTER — HOSPITAL ENCOUNTER (OUTPATIENT)
Dept: INFUSION THERAPY | Age: 72
Discharge: HOME | End: 2022-10-12
Payer: MEDICARE

## 2022-10-12 ENCOUNTER — HOSPITAL ENCOUNTER (OUTPATIENT)
Dept: RADIATION ONCOLOGY | Age: 72
Setting detail: RECURRING SERIES
Discharge: HOME | End: 2022-10-15
Payer: MEDICARE

## 2022-10-12 ENCOUNTER — OFFICE VISIT (OUTPATIENT)
Dept: ONCOLOGY | Age: 72
End: 2022-10-12
Payer: MEDICARE

## 2022-10-12 ENCOUNTER — CLINICAL DOCUMENTATION (OUTPATIENT)
Dept: ONCOLOGY | Age: 72
End: 2022-10-12

## 2022-10-12 VITALS
SYSTOLIC BLOOD PRESSURE: 119 MMHG | TEMPERATURE: 97.8 F | OXYGEN SATURATION: 95 % | HEIGHT: 66 IN | WEIGHT: 132.6 LBS | HEART RATE: 89 BPM | DIASTOLIC BLOOD PRESSURE: 72 MMHG | RESPIRATION RATE: 18 BRPM | BODY MASS INDEX: 21.31 KG/M2

## 2022-10-12 DIAGNOSIS — C34.90 NON-SMALL CELL LUNG CANCER, UNSPECIFIED LATERALITY (HCC): Primary | ICD-10-CM

## 2022-10-12 DIAGNOSIS — R53.83 FATIGUE DUE TO TREATMENT: ICD-10-CM

## 2022-10-12 DIAGNOSIS — R11.0 NAUSEA: ICD-10-CM

## 2022-10-12 DIAGNOSIS — R13.10 ODYNOPHAGIA: ICD-10-CM

## 2022-10-12 DIAGNOSIS — C34.90 NON-SMALL CELL LUNG CANCER, UNSPECIFIED LATERALITY (HCC): ICD-10-CM

## 2022-10-12 DIAGNOSIS — Z79.899 HIGH RISK MEDICATION USE: ICD-10-CM

## 2022-10-12 LAB
ALBUMIN SERPL-MCNC: 3.4 G/DL (ref 3.2–4.6)
ALBUMIN/GLOB SERPL: 1 {RATIO} (ref 0.4–1.6)
ALP SERPL-CCNC: 49 U/L (ref 50–136)
ALT SERPL-CCNC: 25 U/L (ref 12–65)
ANION GAP SERPL CALC-SCNC: 7 MMOL/L (ref 2–11)
AST SERPL-CCNC: 21 U/L (ref 15–37)
BASOPHILS # BLD: 0 K/UL (ref 0–0.2)
BASOPHILS NFR BLD: 1 % (ref 0–2)
BILIRUB SERPL-MCNC: 0.3 MG/DL (ref 0.2–1.1)
BUN SERPL-MCNC: 22 MG/DL (ref 8–23)
CALCIUM SERPL-MCNC: 9.1 MG/DL (ref 8.3–10.4)
CEA SERPL-MCNC: 6.1 NG/ML (ref 0–3)
CHLORIDE SERPL-SCNC: 101 MMOL/L (ref 101–110)
CO2 SERPL-SCNC: 29 MMOL/L (ref 21–32)
CREAT SERPL-MCNC: 0.9 MG/DL (ref 0.6–1)
DIFFERENTIAL METHOD BLD: ABNORMAL
EOSINOPHIL # BLD: 0 K/UL (ref 0–0.8)
EOSINOPHIL NFR BLD: 0 % (ref 0.5–7.8)
ERYTHROCYTE [DISTWIDTH] IN BLOOD BY AUTOMATED COUNT: 17.2 % (ref 11.9–14.6)
GLOBULIN SER CALC-MCNC: 3.5 G/DL (ref 2.8–4.5)
GLUCOSE SERPL-MCNC: 140 MG/DL (ref 65–100)
HCT VFR BLD AUTO: 31.5 % (ref 35.8–46.3)
HGB BLD-MCNC: 10.1 G/DL (ref 11.7–15.4)
IMM GRANULOCYTES # BLD AUTO: 0 K/UL (ref 0–0.5)
IMM GRANULOCYTES NFR BLD AUTO: 1 % (ref 0–5)
LYMPHOCYTES # BLD: 0.2 K/UL (ref 0.5–4.6)
LYMPHOCYTES NFR BLD: 9 % (ref 13–44)
MAGNESIUM SERPL-MCNC: 1.9 MG/DL (ref 1.8–2.4)
MCH RBC QN AUTO: 30 PG (ref 26.1–32.9)
MCHC RBC AUTO-ENTMCNC: 32.1 G/DL (ref 31.4–35)
MCV RBC AUTO: 93.5 FL (ref 82–102)
MONOCYTES # BLD: 0.3 K/UL (ref 0.1–1.3)
MONOCYTES NFR BLD: 9 % (ref 4–12)
NEUTS SEG # BLD: 2.2 K/UL (ref 1.7–8.2)
NEUTS SEG NFR BLD: 81 % (ref 43–78)
NRBC # BLD: 0 K/UL (ref 0–0.2)
PLATELET # BLD AUTO: 155 K/UL (ref 150–450)
PMV BLD AUTO: 8.7 FL (ref 9.4–12.3)
POTASSIUM SERPL-SCNC: 3.2 MMOL/L (ref 3.5–5.1)
PROT SERPL-MCNC: 6.9 G/DL (ref 6.3–8.2)
RBC # BLD AUTO: 3.37 M/UL (ref 4.05–5.2)
SODIUM SERPL-SCNC: 137 MMOL/L (ref 133–143)
WBC # BLD AUTO: 2.8 K/UL (ref 4.3–11.1)

## 2022-10-12 PROCEDURE — 96375 TX/PRO/DX INJ NEW DRUG ADDON: CPT

## 2022-10-12 PROCEDURE — A4216 STERILE WATER/SALINE, 10 ML: HCPCS | Performed by: INTERNAL MEDICINE

## 2022-10-12 PROCEDURE — 80053 COMPREHEN METABOLIC PANEL: CPT

## 2022-10-12 PROCEDURE — 96413 CHEMO IV INFUSION 1 HR: CPT

## 2022-10-12 PROCEDURE — G8420 CALC BMI NORM PARAMETERS: HCPCS | Performed by: INTERNAL MEDICINE

## 2022-10-12 PROCEDURE — 82378 CARCINOEMBRYONIC ANTIGEN: CPT

## 2022-10-12 PROCEDURE — 2580000003 HC RX 258: Performed by: INTERNAL MEDICINE

## 2022-10-12 PROCEDURE — 1036F TOBACCO NON-USER: CPT | Performed by: INTERNAL MEDICINE

## 2022-10-12 PROCEDURE — 6360000002 HC RX W HCPCS: Performed by: INTERNAL MEDICINE

## 2022-10-12 PROCEDURE — 1090F PRES/ABSN URINE INCON ASSESS: CPT | Performed by: INTERNAL MEDICINE

## 2022-10-12 PROCEDURE — 77386 HC NTSTY MODUL RAD TX DLVR CPLX: CPT

## 2022-10-12 PROCEDURE — 2500000003 HC RX 250 WO HCPCS: Performed by: INTERNAL MEDICINE

## 2022-10-12 PROCEDURE — G8427 DOCREV CUR MEDS BY ELIG CLIN: HCPCS | Performed by: INTERNAL MEDICINE

## 2022-10-12 PROCEDURE — 96417 CHEMO IV INFUS EACH ADDL SEQ: CPT

## 2022-10-12 PROCEDURE — G8484 FLU IMMUNIZE NO ADMIN: HCPCS | Performed by: INTERNAL MEDICINE

## 2022-10-12 PROCEDURE — G8400 PT W/DXA NO RESULTS DOC: HCPCS | Performed by: INTERNAL MEDICINE

## 2022-10-12 PROCEDURE — 1123F ACP DISCUSS/DSCN MKR DOCD: CPT | Performed by: INTERNAL MEDICINE

## 2022-10-12 PROCEDURE — 83735 ASSAY OF MAGNESIUM: CPT

## 2022-10-12 PROCEDURE — 99215 OFFICE O/P EST HI 40 MIN: CPT | Performed by: INTERNAL MEDICINE

## 2022-10-12 PROCEDURE — 36591 DRAW BLOOD OFF VENOUS DEVICE: CPT

## 2022-10-12 PROCEDURE — 3017F COLORECTAL CA SCREEN DOC REV: CPT | Performed by: INTERNAL MEDICINE

## 2022-10-12 PROCEDURE — 85025 COMPLETE CBC W/AUTO DIFF WBC: CPT

## 2022-10-12 RX ORDER — MEPERIDINE HYDROCHLORIDE 25 MG/ML
12.5 INJECTION INTRAMUSCULAR; INTRAVENOUS; SUBCUTANEOUS PRN
Status: DISCONTINUED | OUTPATIENT
Start: 2022-10-12 | End: 2022-10-13 | Stop reason: HOSPADM

## 2022-10-12 RX ORDER — SODIUM CHLORIDE 9 MG/ML
5-250 INJECTION, SOLUTION INTRAVENOUS PRN
Status: DISCONTINUED | OUTPATIENT
Start: 2022-10-12 | End: 2022-10-13 | Stop reason: HOSPADM

## 2022-10-12 RX ORDER — SODIUM CHLORIDE 0.9 % (FLUSH) 0.9 %
5-40 SYRINGE (ML) INJECTION PRN
Status: DISCONTINUED | OUTPATIENT
Start: 2022-10-12 | End: 2022-10-13 | Stop reason: HOSPADM

## 2022-10-12 RX ORDER — ONDANSETRON 2 MG/ML
8 INJECTION INTRAMUSCULAR; INTRAVENOUS
Status: DISCONTINUED | OUTPATIENT
Start: 2022-10-12 | End: 2022-10-13 | Stop reason: HOSPADM

## 2022-10-12 RX ORDER — ALBUTEROL SULFATE 90 UG/1
4 AEROSOL, METERED RESPIRATORY (INHALATION) PRN
Status: DISCONTINUED | OUTPATIENT
Start: 2022-10-12 | End: 2022-10-13 | Stop reason: HOSPADM

## 2022-10-12 RX ORDER — DIPHENHYDRAMINE HYDROCHLORIDE 50 MG/ML
50 INJECTION INTRAMUSCULAR; INTRAVENOUS
Status: DISCONTINUED | OUTPATIENT
Start: 2022-10-12 | End: 2022-10-13 | Stop reason: HOSPADM

## 2022-10-12 RX ORDER — SODIUM CHLORIDE 9 MG/ML
INJECTION, SOLUTION INTRAVENOUS CONTINUOUS
Status: DISCONTINUED | OUTPATIENT
Start: 2022-10-12 | End: 2022-10-13 | Stop reason: HOSPADM

## 2022-10-12 RX ORDER — DIPHENHYDRAMINE HYDROCHLORIDE 50 MG/ML
50 INJECTION INTRAMUSCULAR; INTRAVENOUS ONCE
Status: COMPLETED | OUTPATIENT
Start: 2022-10-12 | End: 2022-10-12

## 2022-10-12 RX ORDER — ONDANSETRON 2 MG/ML
8 INJECTION INTRAMUSCULAR; INTRAVENOUS ONCE
Status: COMPLETED | OUTPATIENT
Start: 2022-10-12 | End: 2022-10-12

## 2022-10-12 RX ORDER — EPINEPHRINE 1 MG/ML
0.3 INJECTION, SOLUTION, CONCENTRATE INTRAVENOUS PRN
Status: DISCONTINUED | OUTPATIENT
Start: 2022-10-12 | End: 2022-10-13 | Stop reason: HOSPADM

## 2022-10-12 RX ORDER — SODIUM CHLORIDE 0.9 % (FLUSH) 0.9 %
10 SYRINGE (ML) INJECTION PRN
Status: DISCONTINUED | OUTPATIENT
Start: 2022-10-12 | End: 2022-10-13 | Stop reason: HOSPADM

## 2022-10-12 RX ORDER — ACETAMINOPHEN 325 MG/1
650 TABLET ORAL
Status: DISCONTINUED | OUTPATIENT
Start: 2022-10-12 | End: 2022-10-13 | Stop reason: HOSPADM

## 2022-10-12 RX ADMIN — SODIUM CHLORIDE, PRESERVATIVE FREE 10 ML: 5 INJECTION INTRAVENOUS at 10:39

## 2022-10-12 RX ADMIN — ONDANSETRON 8 MG: 2 INJECTION INTRAMUSCULAR; INTRAVENOUS at 10:43

## 2022-10-12 RX ADMIN — DIPHENHYDRAMINE HYDROCHLORIDE 50 MG: 50 INJECTION, SOLUTION INTRAMUSCULAR; INTRAVENOUS at 10:46

## 2022-10-12 RX ADMIN — PACLITAXEL 72 MG: 6 INJECTION, SOLUTION, CONCENTRATE INTRAVENOUS at 11:44

## 2022-10-12 RX ADMIN — SODIUM CHLORIDE 25 ML/HR: 9 INJECTION, SOLUTION INTRAVENOUS at 10:39

## 2022-10-12 RX ADMIN — DEXAMETHASONE SODIUM PHOSPHATE 12 MG: 4 INJECTION, SOLUTION INTRAMUSCULAR; INTRAVENOUS at 10:58

## 2022-10-12 RX ADMIN — FAMOTIDINE 20 MG: 10 INJECTION, SOLUTION INTRAVENOUS at 10:41

## 2022-10-12 RX ADMIN — Medication 10 ML: at 08:17

## 2022-10-12 RX ADMIN — CARBOPLATIN 155.6 MG: 10 INJECTION, SOLUTION INTRAVENOUS at 12:47

## 2022-10-12 ASSESSMENT — ANXIETY QUESTIONNAIRES
GAD7 TOTAL SCORE: 0
7. FEELING AFRAID AS IF SOMETHING AWFUL MIGHT HAPPEN: 0
6. BECOMING EASILY ANNOYED OR IRRITABLE: 0
4. TROUBLE RELAXING: 0
2. NOT BEING ABLE TO STOP OR CONTROL WORRYING: 0
IF YOU CHECKED OFF ANY PROBLEMS ON THIS QUESTIONNAIRE, HOW DIFFICULT HAVE THESE PROBLEMS MADE IT FOR YOU TO DO YOUR WORK, TAKE CARE OF THINGS AT HOME, OR GET ALONG WITH OTHER PEOPLE: NOT DIFFICULT AT ALL
5. BEING SO RESTLESS THAT IT IS HARD TO SIT STILL: 0
1. FEELING NERVOUS, ANXIOUS, OR ON EDGE: 0
3. WORRYING TOO MUCH ABOUT DIFFERENT THINGS: 0

## 2022-10-12 ASSESSMENT — PATIENT HEALTH QUESTIONNAIRE - PHQ9
8. MOVING OR SPEAKING SO SLOWLY THAT OTHER PEOPLE COULD HAVE NOTICED. OR THE OPPOSITE, BEING SO FIGETY OR RESTLESS THAT YOU HAVE BEEN MOVING AROUND A LOT MORE THAN USUAL: 0
5. POOR APPETITE OR OVEREATING: 0
4. FEELING TIRED OR HAVING LITTLE ENERGY: 3
7. TROUBLE CONCENTRATING ON THINGS, SUCH AS READING THE NEWSPAPER OR WATCHING TELEVISION: 0
SUM OF ALL RESPONSES TO PHQ QUESTIONS 1-9: 3
6. FEELING BAD ABOUT YOURSELF - OR THAT YOU ARE A FAILURE OR HAVE LET YOURSELF OR YOUR FAMILY DOWN: 0
1. LITTLE INTEREST OR PLEASURE IN DOING THINGS: 0
3. TROUBLE FALLING OR STAYING ASLEEP: 0
2. FEELING DOWN, DEPRESSED OR HOPELESS: 0
SUM OF ALL RESPONSES TO PHQ QUESTIONS 1-9: 3
SUM OF ALL RESPONSES TO PHQ9 QUESTIONS 1 & 2: 0
9. THOUGHTS THAT YOU WOULD BE BETTER OFF DEAD, OR OF HURTING YOURSELF: 0

## 2022-10-12 NOTE — PROGRESS NOTES
Arrived to the [facility] via wheelchair. C6D1 Carbo/Taxol infusions completed. Patient tolerated well. Any issues or concerns during appointment: none. Patient aware of next lab and [facility] office visit on 11/17/22 (date) at 12 (time). Patient instructed to call provider with temperature of 100.4 or greater or nausea/vomiting/ diarrhea or pain not controlled by medications  Discharged via wheelchair.

## 2022-10-12 NOTE — PROGRESS NOTES
[facility] Hematology & Oncology: Office Visit Progress Note    Chief Complaint:    Non-small cell lung cancer stage III    History of Present Illness:  Reason for Referral:  Non-small cell lung cancer     Referring Provider:  Dr. Sejal Dos Santos, [facility] Pulmonary and Critical Care     Primary Care Provider:  Dr. Danielito Masters, [facility])     Family History of Cancer/Hematologic Disorders:  No documented family history     Presenting Symptoms:   Presented to [facility] ED with COPD exacerbation     Ms. Jung Hughes is a 67 y.o.  female with a medical history of COPD, asthma, oxygen dependent, CAD, closed right hip and femur fracture (2/22), subdural hematoma (3/22), MI, and thyroid disease. Surgical history includes bilateral cataract removal, right hip surgery, and right carotid endarterectomy. She is a former smoker of 50 years but quit in 2017. Denies alcohol use. On 6/9/22 patient presented to the [facility] ED with increased shortness of breath over several days. Admitted with exacerbation of COPD. CXR showed unchanged consolidation of left lower lobe lung. On 6/11 CT Chest PE protocol was performed which revealed no PE but a increase in the left lower lobe lung mass. She was discharged home on 6/12 with instruction to follow up with pulmonary within 1 week. On 6/24/22 she had a PET scan which showed known lung mass and left hilar and subcarinal mediastinal activity as well. On 6/28/22 she was seen in follow up with pulmonary. PET imaging reviewed. Recommended EBUS. Procedure was performed on 7/20/22. Pathology showed malignant cells 11L lymph node. Referral to medical and radiation oncology for evaluation and treatment recommendations.       MRI Brain scheduled on 8/2 and radiation oncology consult on 7/29.       6/9/22 [facility] ED CXR  FINDINGS: The lungs are hyperexpanded, consistent with emphysematous changes on the prior CT chest. Areas of nodular consolidation in the left lower lobe are unchanged. No acute infiltrate or pulmonary edema is seen. The heart size, mediastinal contour and pulmonary vasculature are normal. There are coronary artery calcifications or stents. The thorax or pleural effusion is seen. IMPRESSION:  1. Emphysema. 2. Coronary artery disease. 3. Unchanged nodular consolidation in the left lower lobe. 6/11/22 [facility] CT Chest PE protocol  Findings: There is no pleural or pericardial effusion. The heart and great vessels are unremarkable. There are no filling defects within the pulmonary arteries to suggest pulmonary emboli. Moderate emphysematous changes are present. Patchy interstitial opacity is present within the right upper lobe  anteriorly. Similar but less impressive findings are newly visualized within the left upper lobe anteriorly. Again noted is the ill-defined masslike opacity within the left lower lobe measuring approximately 2.0 x 3.6 cm previously measured at approximately 1.7 x 2.8 cm. Also is a ground glass opacity within the left upper lobe measuring 1.5 cm, unchanged when remeasured on the previous study. A left hilar lymph node  measures 1.4 cm in short axis, unchanged. There is a small left Bochdalek hernia containing fat. Imaging of the upper abdomen is unremarkable. There are compression fractures within the thoracic and lumbar spine with mild height loss. The fractures at T10 and T11 has occurred in the interim. IMPRESSION:  1. No evidence of pulmonary embolism. 2. Interval increase in left lower lobe mass suspicious for bronchogenic carcinoma. A left hilar lymph node also is enlarged concerning for metastatic adenopathy. 3. Newly visualized interstitial opacities within the upper lobes, right greater than left, most likely infectious. 4. Stable left lower lobe groundglass opacity. 5.  Interval compression fractures with mild height loss at T10 and T11.     6/24/22 [facility] PET  FINDINGS:  NECK/CHEST:  No suspicious activity is seen in the neck. Only minimal nonfocal activity is seen about left posterior facets in the mid cervical spine likely related to benign degenerative changes. No clear aggressive osseous lesion is suggested on limited CT images. The most clear suspicious activity is seen associated with a large heterogeneous lesion in the left lower lobe demonstrating spiculated appearing components best appreciated on axial image 92 measuring 4.4 cm x 2.3 cm in greatest transverse dimensions and with a maximum SUV of 11.2 g/mL. Malignancy is not excluded given the appearance. Additional focal appearing activity is seen in the left hilum on  pet/CT image 85 with a maximum SUV of 9.1 g/mL and in the subcarinal mediastinum on axial image 84 with a maximum SUV of 6.3 g/mL concerning for left hilar and subcarinal mediastinal idego metastases, respectively. Additional mild activity is seen in the mediastinum and left hilum on pet/CT image 76 which does appear to correspond to small prevascular and left hilar lymph nodes seen on axial CT  image 39 of the prior the chest dated 6/11/2022 which are suspicious for additional early diego metastases. No clearly suspicious activity is otherwise seen in the chest. Mild to moderate activity is seen in the medial right upper lobe on pet/CT image 56 with a maximum SUV of 4.8 g/mL. Although the degree of activity is suspicious, this appears to correspond to a fluid containing cystic structure in the right upper lobe with a CT appearance being more suggestive of an inflammatory process. Additional soft tissue activity is seen over the lateral scapula which is most consistent with benign muscle activity. ABDOMEN/PELVIS:  The only potentially suspicious activity is skin activity involving the left  proximal medial thigh seen on pet/CT image 257 with a maximum SUV of 6.3 g/mL which appears to be associated with focal skin thickening.  This is not felt to be related to the patient's lung cancer. However, a dermal neoplasm or focal  inflammatory process is not excluded given the appearance. This should be further assessed with direct visualization. No suspicious activity is otherwise seen below the diaphragms. Activity is seen adjacent to the right femoral neck. However, this appears to correspond to heterotopic bone formation from hip  surgery at this level without aggressive features suggested therefore is not worrisome. No focal hepatic activity is seen. No contour deforming, or hypermetabolic adrenal lesion is seen. Advanced atherosclerotic calcification is seen of the abdominal aorta. IMPRESSION:  1. Heterogeneous left lower lobe mass demonstrating irregular spiculated components and measuring 4.4 cm x 2.3 cm in greatest transverse dimensions and with a maximum SUV of 11.2 g/mL. Malignancy is not excluded given the appearance. Further evaluation as clinically indicated is recommended. 2. Additional left hilar and subcarinal mediastinal activity concerning for diego metastases. Additional mild activity is seen within a prevascular mediastinal lymph node an additional left hilar lymph nodes for which additional early diego metastases are not excluded. 3. Focal activity associated with focal skin thickening in the most medial, proximal left thigh seen on pet/CT image 257. This is unlikely to be related to potential lung cancer although could represent an additional dermal neoplasm or focal abnormal inflammatory process otherwise. This should be clinically  evaluated with direct visualization. This has been marked on image to assist with clinical correlation. 4. Mild activity associated with a cystic abnormality in the medial right upper lobe. Although the degree of activity is suspicious (maximum SUV of 4.8 g/mL) the CT features are more consistent with a potential inflammatory process. Attention on follow-up studies is recommended. 7/20/22 [facility] Pathology EBUS  DIAGNOSIS  11L Lymph Node, Fine Needle Aspiration:      MALIGNANT CELLS PRESENT. Cell block: Malignant cells present. Immunohistochemical findings will follow in an addendum. Notes from Referring Provider:  None     Other Pertinent Information:  Covid vaccination - 1/28/21, 2/18/21, and 9/25/21     Radiation oncology consult with Dr. Mary Lou Moreno scheduled for 7/29/22 at 1 pm.     MRI Brain is scheduled for 8/2/22 at 8 am.         Review of Systems:  Constitutional Fatigue. Denies fever or chills. Denies weight loss or appetite changes. Denies anorexia. HEENT Denies trauma, bluring vision, hearing loss, ear pain, nosebleeds, sore throat, neck pain and ear discharge. Skin Denies lesions or rashes. Lungs Denies shortness of breath, cough, sputum production or hemoptysis. Cardiovascular Denies chest pain, palpitations, orthopnea, claudication and leg swelling. Gastrointestinal Odynophagia. Denies nausea, vomiting, bowel changes. Denies bloody or black stools. Denies abdominal pain.  Denies dysuria, frequency or hesitancy of urination   Neuro Denies headaches, visual changes or ataxia. Denies dizziness, tingling, tremors, sensory change, speech change, focal weakness and headaches. Hematology Denies nasal/gum bleeding, denies easy bruise   Endo Denies heat/cold intolerance, denies diabetes. MSK Denies back pain, swollen legs, myalgias and falls. Psychiatric/Behavioral Denies depression and substance abuse. The patient is not nervous/anxious. Allergies   Allergen Reactions    Promethazine Nausea And Vomiting and Other (See Comments)     Severe vomiting      Past Medical History:   Diagnosis Date    Acute respiratory failure with hypoxia (HCC) 08/22/2013    Asthma     CAD (coronary artery disease)     Followed by [address].     Chest pain 05/31/2019    pt denies any reent CP or increase SOB    Closed right hip fracture ([facility] [address] 02/04/2022    COPD with acute lower respiratory infection ([facility] [address] 02/10/2022    severe COPD with centrilobular emphysema, on nocturnal O2.     EKG, abnormal 02/15/2022    Femur fracture, right ([facility] [address] 2022    Former cigarette smoker     H/O heart artery stent     several stents- last stents placed 2022    History of MI (myocardial infarction)     Lung mass     Multiple closed anterior-posterior compression fractures of pelvis ([facility] [address] 2022    Other fracture of right femur, initial encounter for closed fracture ([facility] [address] 2022    Oxygen dependent     2L NC continuous    Post-traumatic subdural hematoma 03/15/2022    SDH (subdural hematoma) 2022    hx of    Thyroid disease      Past Surgical History:   Procedure Laterality Date    BRONCHOSCOPY N/A 2022    BRONCHOSCOPY ENDOBRONCHIAL ULTRASOUND performed by Kelly Sorto MD at [facility] ENDOSCOPY    CAROTID ENDARTERECTOMY Right     CATARACT REMOVAL Bilateral     HIP SURGERY Right     IR PORT PLACEMENT EQUAL OR GREATER THAN 5 YEARS  2022    IR PORT PLACEMENT EQUAL OR GREATER THAN 5 YEARS 2022 [facility] RADIOLOGY SPECIALS    WISDOM TOOTH EXTRACTION      as a teenagers     Family History   Adopted: Yes   Problem Relation Age of Onset    No Known Problems Mother     No Known Problems Father      Social History     Socioeconomic History    Marital status:      Spouse name: Not on file    Number of children: Not on file    Years of education: Not on file    Highest education level: Not on file   Occupational History    Not on file   Tobacco Use    Smoking status: Former     Packs/day: 0.50     Years: 50.00     Pack years: 25.00     Types: Cigarettes     Quit date:      Years since quittin.7    Smokeless tobacco: Never   Substance and Sexual Activity    Alcohol use: Not Currently    Drug use: Not on file    Sexual activity: Not on file   Other Topics Concern    Not on file   Social History Narrative    Not on file     Social Determinants of Health     Financial Resource Strain: Not on file   Food Insecurity: Not on file   Transportation Needs: Not on file   Physical Activity: Not on file   Stress: Not on file   Social Connections: Not on file   Intimate Partner Violence: Not on file   Housing Stability: Not on file     Current Outpatient Medications   Medication Sig Dispense Refill    Magic Mouthwash (MIRACLE MOUTHWASH) Swish and swallow 5 mLs 4 times daily as needed for Irritation Equal parts viscous lidocaine, maalox, nystatin, benadryl 480 mL 1    traMADol (ULTRAM) 50 MG tablet Take 2 tablets by mouth 4 times daily for 90 days. Z51.5 Palliative Care 240 tablet 2    predniSONE (DELTASONE) 5 MG tablet TAKE 1 TABLET BY MOUTH TWICE DAILY      albuterol (PROVENTIL) (2.5 MG/3ML) 0.083% nebulizer solution TAKE 3 ML BY NEBULIZATION EVERY 6 HOURS AS NEEDED FOR SHORTNESS OF BREATH      tiotropium (SPIRIVA RESPIMAT) 2.5 MCG/ACT AERS inhaler Inhale 2 puffs into the lungs in the morning. 1 each 11    budesonide-formoterol (SYMBICORT) 160-4.5 MCG/ACT AERO Inhale 2 puffs into the lungs in the morning and 2 puffs before bedtime. 10.2 g 11    OXYGEN Inhale into the lungs 2 LPM      lidocaine-prilocaine (EMLA) 2.5-2.5 % cream Apply topically as needed.  30 g 1    ondansetron (ZOFRAN ODT) 4 MG disintegrating tablet Take 1 tablet by mouth every 8 hours as needed for Nausea or Vomiting 90 tablet 1    predniSONE (DELTASONE) 10 MG tablet Take 10 mg by mouth daily (with breakfast)      Pseudoephedrine-guaiFENesin (MUCINEX D PO) Take by mouth in the morning and at bedtime      torsemide (DEMADEX) 10 MG tablet Take 10 mg by mouth daily      acetaminophen (TYLENOL) 325 MG tablet Take by mouth every 4 hours as needed      aspirin 81 MG chewable tablet Take 81 mg by mouth daily      atorvastatin (LIPITOR) 80 MG tablet Take 80 mg by mouth at bedtime TAKE 1 TABLET BY MOUTH DAILY      cetirizine (ZYRTEC) 10 MG tablet Take 10 mg by mouth daily as needed      clopidogrel (PLAVIX) 75 MG tablet Take 75 mg by mouth heard.   Abdomen Soft. Bowel sounds are normal. Exhibits no distension. There is no tenderness. There is no rebound and no guarding. Neuro Normal reflexes. No cranial nerve deficit. Exhibits normal muscle tone, 5 of 5 strength of all extremities. MSK Normal range of motion in general.  No edema and no tenderness.    Psych Normal mood, affect, behavior, judgment and thought content      Labs:  Recent Results (from the past 24 hour(s))   CEA    Collection Time: 10/12/22  8:05 AM   Result Value Ref Range    CEA 6.1 (H) 0.0 - 3.0 ng/mL   CBC with Auto Differential    Collection Time: 10/12/22  8:05 AM   Result Value Ref Range    WBC 2.8 (L) 4.3 - 11.1 K/uL    RBC 3.37 (L) 4.05 - 5.2 M/uL    Hemoglobin 10.1 (L) 11.7 - 15.4 g/dL    Hematocrit 31.5 (L) 35.8 - 46.3 %    MCV 93.5 82.0 - 102.0 FL    MCH 30.0 26.1 - 32.9 PG    MCHC 32.1 31.4 - 35.0 g/dL    RDW 17.2 (H) 11.9 - 14.6 %    Platelets 511 692 - 579 K/uL    MPV 8.7 (L) 9.4 - 12.3 FL    nRBC 0.00 0.0 - 0.2 K/uL    Differential Type AUTOMATED      Seg Neutrophils 81 (H) 43 - 78 %    Lymphocytes 9 (L) 13 - 44 %    Monocytes 9 4.0 - 12.0 %    Eosinophils % 0 (L) 0.5 - 7.8 %    Basophils 1 0.0 - 2.0 %    Immature Granulocytes 1 0.0 - 5.0 %    Segs Absolute 2.2 1.7 - 8.2 K/UL    Absolute Lymph # 0.2 (L) 0.5 - 4.6 K/UL    Absolute Mono # 0.3 0.1 - 1.3 K/UL    Absolute Eos # 0.0 0.0 - 0.8 K/UL    Basophils Absolute 0.0 0.0 - 0.2 K/UL    Absolute Immature Granulocyte 0.0 0.0 - 0.5 K/UL   Comprehensive Metabolic Panel    Collection Time: 10/12/22  8:05 AM   Result Value Ref Range    Sodium 137 133 - 143 mmol/L    Potassium 3.2 (L) 3.5 - 5.1 mmol/L    Chloride 101 101 - 110 mmol/L    CO2 29 21 - 32 mmol/L    Anion Gap 7 2 - 11 mmol/L    Glucose 140 (H) 65 - 100 mg/dL    BUN 22 8 - 23 MG/DL    Creatinine 0.90 0.6 - 1.0 MG/DL    Est, Glom Filt Rate >60 >60 ml/min/1.73m2    Calcium 9.1 8.3 - 10.4 MG/DL    Total Bilirubin 0.3 0.2 - 1.1 MG/DL    ALT 25 12 - 65 U/L    AST 21 15 - 37 U/L    Alk Phosphatase 49 (L) 50 - 136 U/L    Total Protein 6.9 6.3 - 8.2 g/dL    Albumin 3.4 3.2 - 4.6 g/dL    Globulin 3.5 2.8 - 4.5 g/dL    Albumin/Globulin Ratio 1.0 0.4 - 1.6     Magnesium    Collection Time: 10/12/22  8:05 AM   Result Value Ref Range    Magnesium 1.9 1.8 - 2.4 mg/dL       Imaging:  No results found for this or any previous visit. ASSESSMENT/PLAN:   Diagnosis Orders   1. Non-small cell lung cancer, unspecified laterality ([facility] [address]        2. Fatigue due to treatment        3. High risk medication use        4. Odynophagia        5. Nausea                    67 y.o. F consulted for lung adenocarcinoma presented to [facility] on 7/26/2022 with . She was a longtime smoker quitted 5 years ago, COPD on home oxygen, recently found left lower lobe 4.4 cm mass and PET showed avid left hilar and subcarinal lymphadenopathy, station 7 lymphadenopathy not accessible on EBUS but biopsy of station 11 L lymph node showed lung adenocarcinoma.   We discussed that this was consistent with stage III lung cancer, discussed potential curative intent therapy with concurrent chemoradiation followed by immunotherapy, all questions answered and patient is interested, we will arrange port placement and radiation oncology evaluation, discussed toxicities and management, patient takes Plavix for severe CAD with multiple coronary stents, no absolute contraindication but would need careful management, return on 9/6/2022 and ready to proceed with cycle 1, monitor bruising and platelet carefully, requested to check thyroid function and reportedly being placed on methimazole for supposed hyperthyroidism when she was hospitalized months ago without any follow-ups, check TSH actually elevated and refer to endocrinology to clarify the management, followed on 10/12/2022, still having significant odynophagia but managing with Magic mouthwash and tramadol and maintain the weight, labs reviewed and anemia is stable, proceed to cycle 6 carbo Taxol with 1 more week of radiation, Zofran as needed for nausea, arrange repeat CT chest in 5 weeks and return to discuss result, tentatively to proceed with adjuvant durvalumab unless repeat CT indicated otherwise, call as needed. All questions are answered to their satisfaction. They will call for further questions and concerns. ECOG PERFORMANCE STATUS - 1- Restricted in physically strenuous activity but ambulatory and able to carry out work of a light or sedentary nature such as light house work, office work. Pain - 0 - No pain/10. None/Minimal pain - not affecting QOL     Fatigue - No flowsheet data found. Distress - No flowsheet data found. Elements of this note have been dictated via voice recognition software. Text and phrases may be limited by the accuracy and autoconversion of the software. The chart has been reviewed, but errors may still be present. Matthew Adan M.D.   [facility] [address]  Office : [phone]  Fax : [phone]

## 2022-10-12 NOTE — PROGRESS NOTES
10/12/2022 - Pt seen by Dr. Riaz Camilo for pre-chemo visit. Today is pt's last chemo tx. This next Tuesday is her last radiation tx. Scheduled a CT in 5 weeks, then a FU with Dr. Marco Cross quickly thereafter. Labs reviewed; OK to proceed to infusion. Encouraged to call with questions. Navigation will continue to follow.

## 2022-10-12 NOTE — PATIENT INSTRUCTIONS
Patient Instructions from Today's Visit    Reason for Visit:  Follow-up visit    Diagnosis Information:  https://www.impok/. net/about-us/asco-answers-patient-education-materials/hlsa-mfzmagi-eroo-sheets    Plan: Your last radiation treatment is this coming Tuesday. Your last chemo treatment is today. We will schedule a CT approximately 5 weeks after your radiation treatment; then, a follow-up visit with Dr. Devi Srivastava. You should start regaining your energy by the time you come back to see Dr. Nichelle Doll. Follow Up: In ~ 5 weeks    Recent Lab Results:  Hospital Outpatient Visit on 10/12/2022   Component Date Value Ref Range Status    CEA 10/12/2022 6.1 (A)  0.0 - 3.0 ng/mL Final    Comment: Nonsmoker:  <3.0 ng/mL  Smoker:     <5.0 ng/mL  [employer]. Patient's results of tumor marker testing may not be comparable to labs using different manufacturers/methods.       WBC 10/12/2022 2.8 (A)  4.3 - 11.1 K/uL Final    RBC 10/12/2022 3.37 (A)  4.05 - 5.2 M/uL Final    Hemoglobin 10/12/2022 10.1 (A)  11.7 - 15.4 g/dL Final    Hematocrit 10/12/2022 31.5 (A)  35.8 - 46.3 % Final    MCV 10/12/2022 93.5  82.0 - 102.0 FL Final    MCH 10/12/2022 30.0  26.1 - 32.9 PG Final    MCHC 10/12/2022 32.1  31.4 - 35.0 g/dL Final    RDW 10/12/2022 17.2 (A)  11.9 - 14.6 % Final    Platelets 45/78/5391 155  150 - 450 K/uL Final    MPV 10/12/2022 8.7 (A)  9.4 - 12.3 FL Final    nRBC 10/12/2022 0.00  0.0 - 0.2 K/uL Final    **Note: Absolute NRBC parameter is now reported with Hemogram**    Differential Type 10/12/2022 AUTOMATED    Final    Seg Neutrophils 10/12/2022 81 (A)  43 - 78 % Final    Lymphocytes 10/12/2022 9 (A)  13 - 44 % Final    Monocytes 10/12/2022 9  4.0 - 12.0 % Final    Eosinophils % 10/12/2022 0 (A)  0.5 - 7.8 % Final    Basophils 10/12/2022 1  0.0 - 2.0 % Final    Immature Granulocytes 10/12/2022 1  0.0 - 5.0 % Final    Segs Absolute 10/12/2022 2.2  1.7 - 8.2 K/UL Final    Absolute Lymph # 10/12/2022 0.2 (A)  0.5 - 4.6 K/UL Final    Absolute Mono # 10/12/2022 0.3  0.1 - 1.3 K/UL Final    Absolute Eos # 10/12/2022 0.0  0.0 - 0.8 K/UL Final    Basophils Absolute 10/12/2022 0.0  0.0 - 0.2 K/UL Final    Absolute Immature Granulocyte 10/12/2022 0.0  0.0 - 0.5 K/UL Final    Sodium 10/12/2022 137  133 - 143 mmol/L Final    Potassium 10/12/2022 3.2 (A)  3.5 - 5.1 mmol/L Final    Chloride 10/12/2022 101  101 - 110 mmol/L Final    CO2 10/12/2022 29  21 - 32 mmol/L Final    Anion Gap 10/12/2022 7  2 - 11 mmol/L Final    Glucose 10/12/2022 140 (A)  65 - 100 mg/dL Final    BUN 10/12/2022 22  8 - 23 MG/DL Final    Creatinine 10/12/2022 0.90  0.6 - 1.0 MG/DL Final    Est, Glom Filt Rate 10/12/2022 >60  >60 ml/min/1.73m2 Final    Comment:      Pediatric calculator link: CarWashShow.at. org/professionals/kdoqi/gfr_calculatorped       Effective Oct 3, 2022       These results are not intended for use in patients <25years of age. eGFR results are calculated without a race factor using  the 2021 CKD-EPI equation. Careful clinical correlation is recommended, particularly when comparing to results calculated using previous equations. The CKD-EPI equation is less accurate in patients with extremes of muscle mass, extra-renal metabolism of creatinine, excessive creatine ingestion, or following therapy that affects renal tubular secretion.       Calcium 10/12/2022 9.1  8.3 - 10.4 MG/DL Final    Total Bilirubin 10/12/2022 0.3  0.2 - 1.1 MG/DL Final    ALT 10/12/2022 25  12 - 65 U/L Final    AST 10/12/2022 21  15 - 37 U/L Final    Alk Phosphatase 10/12/2022 49 (A)  50 - 136 U/L Final    Total Protein 10/12/2022 6.9  6.3 - 8.2 g/dL Final    Albumin 10/12/2022 3.4  3.2 - 4.6 g/dL Final    Globulin 10/12/2022 3.5  2.8 - 4.5 g/dL Final    Albumin/Globulin Ratio 10/12/2022 1.0  0.4 - 1.6   Final    Magnesium 10/12/2022 1.9  1.8 - 2.4 mg/dL Final     Treatment Summary has been discussed and given to patient: no    -------------------------------------------------------------------------------------------------------------------  Please call our office at [phone] if you have any  of the following symptoms:   Fever of 100.5 or greater  Chills  Shortness of breath  Swelling or pain in one leg    After office hours an answering service is available and will contact a provider for emergencies or if you are experiencing any of the above symptoms. Patient does express an interest in My Chart. My Chart log in information explained on the after visit summary printout at the [facility] [address] desk. Lisa Tran RN  Solid Tumor Nurse Navigator  [phone] cell phone   For Urgent Matters (after hours and weekends), please call Triage at [phone]. For Emergencies, please call 911.

## 2022-10-12 NOTE — PROGRESS NOTES
Patient arrived to port lab for port access and lab draw   [facility] [address] accessed and labs drawn per protocol   *Port remains accessed   Patient discharged from port lab ambulatory*

## 2022-10-13 ENCOUNTER — HOSPITAL ENCOUNTER (OUTPATIENT)
Dept: RADIATION ONCOLOGY | Age: 72
Setting detail: RECURRING SERIES
Discharge: HOME | End: 2022-10-16
Payer: MEDICARE

## 2022-10-13 PROCEDURE — 77386 HC NTSTY MODUL RAD TX DLVR CPLX: CPT

## 2022-10-14 ENCOUNTER — HOSPITAL ENCOUNTER (OUTPATIENT)
Dept: RADIATION ONCOLOGY | Age: 72
Setting detail: RECURRING SERIES
Discharge: HOME | End: 2022-10-17
Payer: MEDICARE

## 2022-10-14 PROCEDURE — 77386 HC NTSTY MODUL RAD TX DLVR CPLX: CPT

## 2022-10-17 ENCOUNTER — HOSPITAL ENCOUNTER (OUTPATIENT)
Dept: RADIATION ONCOLOGY | Age: 72
Setting detail: RECURRING SERIES
Discharge: HOME | End: 2022-10-20
Payer: MEDICARE

## 2022-10-17 ENCOUNTER — PATIENT MESSAGE (OUTPATIENT)
Dept: PULMONOLOGY | Age: 72
End: 2022-10-17

## 2022-10-17 PROCEDURE — 77386 HC NTSTY MODUL RAD TX DLVR CPLX: CPT

## 2022-10-17 NOTE — PROGRESS NOTES
Patient: Rich Deutsch MRN: [id number]  SSN: [id number]    YOB: 1950  Age: 67 y.o. Sex: female      DIAGNOSIS:  Stage III NSCLC of the JHONY    TREATMENT SITE:  Lungs    DOSE and FRACTIONATION:  29 of 30 fractions; 5800 of 6000cGy    INTERVAL HISTORY:  Rich Deutsch is a 67 y.o. female being treated for Stage III NSCLC of the JHONY. Week 1: No complaints. Week 2: Mild esophagitis. Week 3: Symptoms stable. Week 4: Increased esophagitis. Intermittent shooting R shoulder pain. Week 5: Persistent esophagitis, controlled with Tramadol and MMW  Week 6: Esophagitis not controlled with 100mg Tramadol every 6 hours    OBJECTIVE:  NAD  There were no vitals filed for this visit. Lab Results   Component Value Date/Time     10/12/2022 08:05 AM    K 3.2 10/12/2022 08:05 AM     10/12/2022 08:05 AM    CO2 29 10/12/2022 08:05 AM    BUN 22 10/12/2022 08:05 AM    GFRAA >60 09/27/2022 09:13 AM    MG 1.9 10/12/2022 08:05 AM    GLOB 3.5 10/12/2022 08:05 AM    ALT 25 10/12/2022 08:05 AM     Lab Results   Component Value Date/Time    WBC 2.8 10/12/2022 08:05 AM    HGB 10.1 10/12/2022 08:05 AM    HCT 31.5 10/12/2022 08:05 AM     10/12/2022 08:05 AM     ASSESSMENT and PLAN:  Rich Deutsch is tolerating radiation as anticipated for the current dose and fraction.     - She completes radiation therapy tomorrow and will follow up in rad onc as needed.   - Follow up with Dr. Satya Cavanaugh 11/17/22 with repeat CT  - Follow up with Dr. Carlos Eduardo Dow 11/16/22  - Will discuss short course of oxycodone for worsening esophagitis with Hola Sykes per patient's request    Rick Abad MD   October 17, 2022

## 2022-10-18 ENCOUNTER — TELEPHONE (OUTPATIENT)
Dept: PULMONOLOGY | Age: 72
End: 2022-10-18

## 2022-10-18 ENCOUNTER — HOSPITAL ENCOUNTER (OUTPATIENT)
Dept: RADIATION ONCOLOGY | Age: 72
Setting detail: RECURRING SERIES
Discharge: HOME | End: 2022-10-21
Payer: MEDICARE

## 2022-10-18 ENCOUNTER — OFFICE VISIT (OUTPATIENT)
Dept: CARDIOLOGY CLINIC | Age: 72
End: 2022-10-18
Payer: MEDICARE

## 2022-10-18 VITALS
WEIGHT: 132 LBS | BODY MASS INDEX: 21.21 KG/M2 | SYSTOLIC BLOOD PRESSURE: 110 MMHG | HEART RATE: 68 BPM | DIASTOLIC BLOOD PRESSURE: 60 MMHG | HEIGHT: 66 IN

## 2022-10-18 DIAGNOSIS — Z51.5 PALLIATIVE CARE PATIENT: ICD-10-CM

## 2022-10-18 DIAGNOSIS — Z01.818 PRE-OP EVALUATION: ICD-10-CM

## 2022-10-18 DIAGNOSIS — I25.9 ISCHEMIC HEART DISEASE: Primary | ICD-10-CM

## 2022-10-18 DIAGNOSIS — C34.90 NON-SMALL CELL LUNG CANCER, UNSPECIFIED LATERALITY (HCC): ICD-10-CM

## 2022-10-18 DIAGNOSIS — R07.89 CHEST WALL PAIN: ICD-10-CM

## 2022-10-18 PROCEDURE — 3017F COLORECTAL CA SCREEN DOC REV: CPT | Performed by: INTERNAL MEDICINE

## 2022-10-18 PROCEDURE — G8420 CALC BMI NORM PARAMETERS: HCPCS | Performed by: INTERNAL MEDICINE

## 2022-10-18 PROCEDURE — G8484 FLU IMMUNIZE NO ADMIN: HCPCS | Performed by: INTERNAL MEDICINE

## 2022-10-18 PROCEDURE — 1090F PRES/ABSN URINE INCON ASSESS: CPT | Performed by: INTERNAL MEDICINE

## 2022-10-18 PROCEDURE — G8400 PT W/DXA NO RESULTS DOC: HCPCS | Performed by: INTERNAL MEDICINE

## 2022-10-18 PROCEDURE — 99214 OFFICE O/P EST MOD 30 MIN: CPT | Performed by: INTERNAL MEDICINE

## 2022-10-18 PROCEDURE — 77336 RADIATION PHYSICS CONSULT: CPT

## 2022-10-18 PROCEDURE — G8428 CUR MEDS NOT DOCUMENT: HCPCS | Performed by: INTERNAL MEDICINE

## 2022-10-18 PROCEDURE — 77386 HC NTSTY MODUL RAD TX DLVR CPLX: CPT

## 2022-10-18 PROCEDURE — 1123F ACP DISCUSS/DSCN MKR DOCD: CPT | Performed by: INTERNAL MEDICINE

## 2022-10-18 PROCEDURE — 1036F TOBACCO NON-USER: CPT | Performed by: INTERNAL MEDICINE

## 2022-10-18 RX ORDER — HYDROCODONE BITARTRATE AND ACETAMINOPHEN 5; 325 MG/1; MG/1
1 TABLET ORAL 2 TIMES DAILY
Qty: 60 TABLET | Refills: 0 | Status: SHIPPED | OUTPATIENT
Start: 2022-10-18 | End: 2022-11-17

## 2022-10-18 NOTE — PROGRESS NOTES
[facility] CARDIOLOGY  [address], [address] [facility], [address] [place] [address]  PHONE: [phone]        10/18/22        NAME:  Kyrie Burns  : 1950  MRN: [id number]     CHIEF COMPLAINT:    Coronary Artery Disease         SUBJECTIVE:     Since last visit a non small cell lung ca detected on ct and pt has has had ctx and xrt which is finishing up today. No chest pain or palpitation or dizziness. Oral sx is pending. Medications were all reviewed with the patient today and updated as necessary. Current Outpatient Medications   Medication Sig    Magic Mouthwash (MIRACLE MOUTHWASH) Swish and swallow 5 mLs 4 times daily as needed for Irritation Equal parts viscous lidocaine, maalox, nystatin, benadryl    traMADol (ULTRAM) 50 MG tablet Take 2 tablets by mouth 4 times daily for 90 days. Z51.5 Palliative Care    albuterol (PROVENTIL) (2.5 MG/3ML) 0.083% nebulizer solution TAKE 3 ML BY NEBULIZATION EVERY 6 HOURS AS NEEDED FOR SHORTNESS OF BREATH    tiotropium (SPIRIVA RESPIMAT) 2.5 MCG/ACT AERS inhaler Inhale 2 puffs into the lungs in the morning. budesonide-formoterol (SYMBICORT) 160-4.5 MCG/ACT AERO Inhale 2 puffs into the lungs in the morning and 2 puffs before bedtime. OXYGEN Inhale into the lungs 2 LPM    lidocaine-prilocaine (EMLA) 2.5-2.5 % cream Apply topically as needed.     ondansetron (ZOFRAN ODT) 4 MG disintegrating tablet Take 1 tablet by mouth every 8 hours as needed for Nausea or Vomiting    prochlorperazine (COMPAZINE) 10 MG tablet Take 1 tablet by mouth every 6 hours as needed (chemo-induced nausea)    predniSONE (DELTASONE) 10 MG tablet Take 10 mg by mouth daily (with breakfast)    Pseudoephedrine-guaiFENesin (MUCINEX D PO) Take by mouth in the morning and at bedtime    torsemide (DEMADEX) 10 MG tablet Take 10 mg by mouth daily    acetaminophen (TYLENOL) 325 MG tablet Take by mouth every 4 hours as needed    aspirin 81 MG chewable tablet Take 81 mg by mouth daily atorvastatin (LIPITOR) 80 MG tablet Take 80 mg by mouth at bedtime TAKE 1 TABLET BY MOUTH DAILY    cetirizine (ZYRTEC) 10 MG tablet Take 10 mg by mouth daily as needed    clopidogrel (PLAVIX) 75 MG tablet Take 75 mg by mouth daily    escitalopram (LEXAPRO) 10 MG tablet Take 10 mg by mouth daily    ezetimibe (ZETIA) 10 MG tablet Take 10 mg by mouth at bedtime    methIMAzole (TAPAZOLE) 10 MG tablet Take 10 mg by mouth daily    metoprolol succinate (TOPROL XL) 100 MG extended release tablet Take 100 mg by mouth daily    nitroGLYCERIN (NITROSTAT) 0.4 MG SL tablet Place 0.4 mg under the tongue 3 times daily as needed    pantoprazole (PROTONIX) 40 MG tablet Take 40 mg by mouth 2 times daily    Roflumilast (DALIRESP) 500 MCG tablet Take 500 mcg by mouth nightly    HYDROcodone-acetaminophen (NORCO) 5-325 MG per tablet Take 1 tablet by mouth 2 times daily for 30 days. Z51.5 Palliative Care    predniSONE (DELTASONE) 5 MG tablet TAKE 1 TABLET BY MOUTH TWICE DAILY (Patient not taking: Reported on 10/18/2022)     No current facility-administered medications for this visit. Allergies   Allergen Reactions    Promethazine Nausea And Vomiting and Other (See Comments)     Severe vomiting            PHYSICAL EXAM:     Wt Readings from Last 3 Encounters:   10/18/22 132 lb (59.9 kg)   10/12/22 132 lb 9.6 oz (60.1 kg)   10/05/22 134 lb 6.4 oz (61 kg)     BP Readings from Last 3 Encounters:   10/18/22 110/60   10/12/22 119/72   10/05/22 135/61       /60   Pulse 68   Ht 5' 5.75\" (1.67 m)   Wt 132 lb (59.9 kg)   BMI 21.47 kg/m²     Physical Exam  Vitals reviewed. HENT:      Head: Normocephalic and atraumatic. Eyes:      Extraocular Movements: Extraocular movements intact. Pupils: Pupils are equal, round, and reactive to light. Cardiovascular:      Rate and Rhythm: Normal rate. Heart sounds: Normal heart sounds. Pulmonary:      Effort: Pulmonary effort is normal.      Breath sounds: Normal breath sounds. Abdominal:      General: Abdomen is flat. Palpations: Abdomen is soft. There is no mass. Musculoskeletal:         General: Normal range of motion. Cervical back: Normal range of motion. Skin:     General: Skin is warm and dry. Neurological:      General: No focal deficit present. Mental Status: She is alert and oriented to person, place, and time. Psychiatric:         Mood and Affect: Mood normal.         RECENT LABS AND RECORDS REVIEW    Cath/pci 2/2022:    Impression  1. Normal left ventricular systolic function  2. Coronary artery disease as described below  3. Successful two-vessel complex PCI is performed as described above. A good result was obtained on the large LAD diagonal with 2 Synergy stents and a high-pressure 2.5/3.0 culotte postdilatation. .  The distal right coronary artery was successfully treated with 2.5 mm POBA. ASSESSMENT and PLAN      Aubree Alberts was seen today for coronary artery disease. Diagnoses and all orders for this visit:    Ischemic heart disease    Non-small cell lung cancer, unspecified laterality ([facility]    Pre-op evaluation     She is cleared for oral sx. OK to hold Plavix 5 days prior to oral sx    Return in about 6 months (around 4/18/2023).        Tricia Vincent MD  10/18/2022  10:27 PM

## 2022-10-24 ENCOUNTER — OFFICE VISIT (OUTPATIENT)
Dept: VASCULAR SURGERY | Age: 72
End: 2022-10-24
Payer: MEDICARE

## 2022-10-24 VITALS
HEIGHT: 66 IN | DIASTOLIC BLOOD PRESSURE: 79 MMHG | OXYGEN SATURATION: 98 % | WEIGHT: 132 LBS | SYSTOLIC BLOOD PRESSURE: 149 MMHG | BODY MASS INDEX: 21.21 KG/M2 | TEMPERATURE: 97.2 F | HEART RATE: 77 BPM

## 2022-10-24 DIAGNOSIS — I65.23 BILATERAL CAROTID ARTERY STENOSIS: ICD-10-CM

## 2022-10-24 DIAGNOSIS — Z98.890 H/O CAROTID ENDARTERECTOMY: Primary | ICD-10-CM

## 2022-10-24 PROCEDURE — 99213 OFFICE O/P EST LOW 20 MIN: CPT | Performed by: NURSE PRACTITIONER

## 2022-10-24 PROCEDURE — G8427 DOCREV CUR MEDS BY ELIG CLIN: HCPCS | Performed by: NURSE PRACTITIONER

## 2022-10-24 PROCEDURE — 1090F PRES/ABSN URINE INCON ASSESS: CPT | Performed by: NURSE PRACTITIONER

## 2022-10-24 PROCEDURE — 1123F ACP DISCUSS/DSCN MKR DOCD: CPT | Performed by: NURSE PRACTITIONER

## 2022-10-24 PROCEDURE — G8400 PT W/DXA NO RESULTS DOC: HCPCS | Performed by: NURSE PRACTITIONER

## 2022-10-24 PROCEDURE — 1036F TOBACCO NON-USER: CPT | Performed by: NURSE PRACTITIONER

## 2022-10-24 PROCEDURE — 3017F COLORECTAL CA SCREEN DOC REV: CPT | Performed by: NURSE PRACTITIONER

## 2022-10-24 PROCEDURE — G8420 CALC BMI NORM PARAMETERS: HCPCS | Performed by: NURSE PRACTITIONER

## 2022-10-24 PROCEDURE — G8484 FLU IMMUNIZE NO ADMIN: HCPCS | Performed by: NURSE PRACTITIONER

## 2022-10-24 RX ORDER — AZITHROMYCIN 500 MG/1
TABLET, FILM COATED ORAL
COMMUNITY

## 2022-10-24 NOTE — PROGRESS NOTES
DATE OF VISIT: 10/24/2022      \[facility] [place] [facility]\""  Amina Candelario is a 67 y.o. female who follows up for her 6-month carotid duplex study. She denies any new lateralizing neurological symptoms. She has undergone a right carotid endarterectomy in 2019. She remains on aspirin, Plavix, Zetia and atorvastatin. She is undergoing treatment for lung cancer. MEDICAL HISTORY:   Past Medical History:   Diagnosis Date    Acute respiratory failure with hypoxia ([address] 08/22/2013    Asthma     CAD (coronary artery disease)     Followed by [facility]. Chest pain 05/31/2019    pt denies any reent CP or increase SOB    Closed right hip fracture ([address] 02/04/2022    COPD with acute lower respiratory infection ([address] 02/10/2022    severe COPD with centrilobular emphysema, on nocturnal O2. 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    SDH (subdural hematoma) 03/18/2022    hx of    Thyroid disease          SURGICAL HISTORY:   Past Surgical History:   Procedure Laterality Date    BRONCHOSCOPY N/A 7/20/2022    BRONCHOSCOPY ENDOBRONCHIAL ULTRASOUND performed by Wilian Rabago MD at [facility] ENDOSCOPY    CAROTID ENDARTERECTOMY Right     CATARACT REMOVAL Bilateral     HIP SURGERY Right     IR PORT PLACEMENT EQUAL OR GREATER THAN 5 YEARS  8/5/2022    IR PORT PLACEMENT EQUAL OR GREATER THAN 5 YEARS 8/5/2022 [facility] RADIOLOGY SPECIALS    WISDOM TOOTH EXTRACTION      as a teenagers       Review of Systems:  Constitutional:   Negative for fevers and unexplained weight loss.   Respiratory:   Negative for hemoptysis. Cardiovascular:   Negative except as noted in HPI. Musculoskeletal:  Negative for active, unexplained/severe joint pain. ALLERGIES:   Allergies   Allergen Reactions    Promethazine Nausea And Vomiting and Other (See Comments)     Severe vomiting        CURRENT MEDICATIONS:  Current Outpatient Medications   Medication Sig Dispense Refill    azithromycin (ZITHROMAX) 500 MG tablet Take by mouth      HYDROcodone-acetaminophen (NORCO) 5-325 MG per tablet Take 1 tablet by mouth 2 times daily for 30 days. Z51.5 Palliative Care 60 tablet 0    Magic Mouthwash (MIRACLE MOUTHWASH) Swish and swallow 5 mLs 4 times daily as needed for Irritation Equal parts viscous lidocaine, maalox, nystatin, benadryl 480 mL 1    traMADol (ULTRAM) 50 MG tablet Take 2 tablets by mouth 4 times daily for 90 days. Z51.5 Palliative Care 240 tablet 2    predniSONE (DELTASONE) 5 MG tablet       albuterol (PROVENTIL) (2.5 MG/3ML) 0.083% nebulizer solution TAKE 3 ML BY NEBULIZATION EVERY 6 HOURS AS NEEDED FOR SHORTNESS OF BREATH      tiotropium (SPIRIVA RESPIMAT) 2.5 MCG/ACT AERS inhaler Inhale 2 puffs into the lungs in the morning. 1 each 11    budesonide-formoterol (SYMBICORT) 160-4.5 MCG/ACT AERO Inhale 2 puffs into the lungs in the morning and 2 puffs before bedtime. 10.2 g 11    OXYGEN Inhale into the lungs 2 LPM      lidocaine-prilocaine (EMLA) 2.5-2.5 % cream Apply topically as needed.  30 g 1    ondansetron (ZOFRAN ODT) 4 MG disintegrating tablet Take 1 tablet by mouth every 8 hours as needed for Nausea or Vomiting 90 tablet 1    prochlorperazine (COMPAZINE) 10 MG tablet Take 1 tablet by mouth every 6 hours as needed (chemo-induced nausea) 90 tablet 2    predniSONE (DELTASONE) 10 MG tablet Take 10 mg by mouth daily (with breakfast)      Pseudoephedrine-guaiFENesin (MUCINEX D PO) Take by mouth in the morning and at bedtime      torsemide (DEMADEX) 10 MG tablet Take 10 mg by mouth daily      acetaminophen (TYLENOL) 325 MG tablet Take by mouth every 4 hours as needed      aspirin 81 MG chewable tablet Take 81 mg by mouth daily      atorvastatin (LIPITOR) 80 MG tablet Take 80 mg by mouth at bedtime TAKE 1 TABLET BY MOUTH DAILY      cetirizine (ZYRTEC) 10 MG tablet Take 10 mg by mouth daily as needed      clopidogrel (PLAVIX) 75 MG tablet Take 75 mg by mouth daily      escitalopram (LEXAPRO) 10 MG tablet Take 10 mg by mouth daily      ezetimibe (ZETIA) 10 MG tablet Take 10 mg by mouth at bedtime      methIMAzole (TAPAZOLE) 10 MG tablet Take 10 mg by mouth daily      metoprolol succinate (TOPROL XL) 100 MG extended release tablet Take 100 mg by mouth daily      nitroGLYCERIN (NITROSTAT) 0.4 MG SL tablet Place 0.4 mg under the tongue 3 times daily as needed      pantoprazole (PROTONIX) 40 MG tablet Take 40 mg by mouth 2 times daily      Roflumilast (DALIRESP) 500 MCG tablet Take 500 mcg by mouth nightly       No current facility-administered medications for this visit. IMAGING: Interpretation Summary         No stenosis in the right internal carotid artery. Mild (<50%) stenosis in the left internal carotid artery. Calcific plaque in the left internal carotid artery. Normal antegrade flow involving the right vertebral artery. Normal antegrade flow involving the left vertebral artery. Procedure Details    A gray scale, color Doppler imaging and spectral Doppler analysis ultrasound was performed. During the study longitudinal and transverse views were obtained. Pulsed wave doppler was performed. Overall the study quality was good. Cerebrovascular Findings      Right Carotid    Patient is s/p carotid endarterectomy. Right arm BP: 152 mmHg. Internal Carotid Artery: No stenosis. External Carotid Artery: Patent. Vertebral Artery: Flow is antegrade. Left Carotid    Left arm BP: 149 mmHg. Internal Carotid Artery: Mild (<50%) stenosis. Calcific plaque. External Carotid Artery: Patent.    Vertebral Artery: Flow is antegrade. The measured diameter reduction of the proximal ICA measures 42%. Carotid Measurements     Right PSV Right EDV Left PSV Left EDV   CCA Prox 72.1 cm/s        13.7 cm/s        95.8 cm/s        12.2 cm/s          CCA Dist 84.9 cm/s        14.9 cm/s        116 cm/s        20.4 cm/s          ICA Prox 70.7 cm/s        10.2 cm/s        127 cm/s        27.4 cm/s          ICA Mid 91.1 cm/s        23.1 cm/s        105 cm/s        18.9 cm/s          ICA Dist 105 cm/s        25.8 cm/s        99.5 cm/s        20.6 cm/s          ICA/CCA Ratio 1.24         1.09           ECA 99.9 cm/s        10.2 cm/s        104 cm/s        9.51 cm/s          Vertebral 73.4 cm/s        11.6 cm/s        40.2 cm/s        7.2 cm/s            Arterial Pressure Measurements     Right Left   Brachial  mmHg        149 mmHg                                    Comparison Study Information    Prior Study    The exam was compared to the study performed on 4/21/2022. Right Carotid: Patch observed in the right CCA/ICA system. Patient is post CEA. B-mode, color and pulsed Doppler imaging indicate a normal extracranial cerebrovascular study. Left Carotid: Peak systolic velocities and atherosclerotic plaque indicate a <50% stenosis in the ICA. The measured diameter reduction of the proximal ICA measures 39%. The vertebral arteries are patent with antegrade flow bilaterally.      Procedure Staff    Technologist/Clinician: Roberth Mirza  Supporting Staff: None  Performing Physician/Midlevel: None     Exam Completion Date/Time: 10/24/22  8:33 AM      PHYSICAL EXAM  VITALS: BP (!) 149/79 (Site: Left Upper Arm, Position: Sitting, Cuff Size: Medium Adult)   Pulse 77   Temp 97.2 °F (36.2 °C) (Temporal)   Ht 5' 5.75\" (1.67 m)   Wt 132 lb (59.9 kg)   SpO2 98%   BMI 21.47 kg/m²       GENERAL: Well developed, well nourished, in NAD  HEAD/NECK: normocephalic, atraumatic, neck supple  MUSCULOSKELETAL: wheelchair  NEURO: sensation and strength grossly intact and symmetrical  PSYCH: alert and oriented to person, place and time      Assessment/Plan: Patient is a 79-year-old female who follows up for her 6-month carotid duplex study. She denies any new lateralizing neurological symptoms. She has undergone a right carotid endarterectomy in 2019. Duplex study is stable with less than 50% stenosis to the left ICA. She is to continue her aspirin, Plavix, Zetia and atorvastatin. She will return in 1 year for continued ultrasound surveillance. She is to present to the emergency department with any S/S of a stroke i.e. slurred speech, facial drooping, amaurosis fugax or unilateral weakness.         YISLE Azar - CNP    20 minutes of time was spent on this encounter including chart review, assessment and evaluation

## 2022-10-25 NOTE — TELEPHONE ENCOUNTER
Spoke with the patient directly and reviewed the escalating pain issues. Completed shared decision and routed RX for hydrocodone. Advised to monitor closely and notified NP of any issues.

## 2022-10-25 NOTE — TELEPHONE ENCOUNTER
From: Magui Nugent  To: Eladio Saleem  Sent: 10/17/2022 3:03 PM EDT  Subject: Hydocodone    I saw Dr. Levi Finn, the radiology oconologist, today. I had a really rough weekend and she was going to talk to you about prescribing hydrocodone. Please let me know whether you have heard from her.   Thanks,  Hollie Said

## 2022-10-31 ENCOUNTER — OFFICE VISIT (OUTPATIENT)
Dept: ENDOCRINOLOGY | Age: 72
End: 2022-10-31
Payer: MEDICARE

## 2022-10-31 VITALS
HEART RATE: 91 BPM | WEIGHT: 128 LBS | OXYGEN SATURATION: 80 % | DIASTOLIC BLOOD PRESSURE: 76 MMHG | SYSTOLIC BLOOD PRESSURE: 118 MMHG | BODY MASS INDEX: 20.82 KG/M2

## 2022-10-31 DIAGNOSIS — E05.90 HYPERTHYROIDISM: Primary | ICD-10-CM

## 2022-10-31 DIAGNOSIS — E04.1 THYROID NODULE: ICD-10-CM

## 2022-10-31 PROCEDURE — 1123F ACP DISCUSS/DSCN MKR DOCD: CPT | Performed by: INTERNAL MEDICINE

## 2022-10-31 PROCEDURE — G8420 CALC BMI NORM PARAMETERS: HCPCS | Performed by: INTERNAL MEDICINE

## 2022-10-31 PROCEDURE — 1090F PRES/ABSN URINE INCON ASSESS: CPT | Performed by: INTERNAL MEDICINE

## 2022-10-31 PROCEDURE — G8428 CUR MEDS NOT DOCUMENT: HCPCS | Performed by: INTERNAL MEDICINE

## 2022-10-31 PROCEDURE — 99204 OFFICE O/P NEW MOD 45 MIN: CPT | Performed by: INTERNAL MEDICINE

## 2022-10-31 PROCEDURE — G8484 FLU IMMUNIZE NO ADMIN: HCPCS | Performed by: INTERNAL MEDICINE

## 2022-10-31 PROCEDURE — 3074F SYST BP LT 130 MM HG: CPT | Performed by: INTERNAL MEDICINE

## 2022-10-31 PROCEDURE — 1036F TOBACCO NON-USER: CPT | Performed by: INTERNAL MEDICINE

## 2022-10-31 PROCEDURE — 3078F DIAST BP <80 MM HG: CPT | Performed by: INTERNAL MEDICINE

## 2022-10-31 PROCEDURE — G8400 PT W/DXA NO RESULTS DOC: HCPCS | Performed by: INTERNAL MEDICINE

## 2022-10-31 PROCEDURE — 3017F COLORECTAL CA SCREEN DOC REV: CPT | Performed by: INTERNAL MEDICINE

## 2022-10-31 RX ORDER — METHIMAZOLE 10 MG/1
5 TABLET ORAL DAILY
Qty: 45 TABLET | Refills: 3 | Status: SHIPPED | OUTPATIENT
Start: 2022-10-31

## 2022-10-31 ASSESSMENT — ENCOUNTER SYMPTOMS
DIARRHEA: 0
CONSTIPATION: 0
SHORTNESS OF BREATH: 1

## 2022-10-31 NOTE — PROGRESS NOTES
Saige Emerson MD, [address] [facility]            Reason for visit: Ben Langston is referred by Brock Villalobos MD for the evaluation and management of hyperthyroidism. ASSESSMENT AND PLAN:    1. Hyperthyroidism  Ms. Tisha Baig has thyrotoxicosis (either Graves' disease or thyroiditis). She is on methimazole but is currently overtreated. I will reduce her dose as below. Reassess in 2 months. - methIMAzole (TAPAZOLE) 10 MG tablet; Take 0.5 tablets by mouth daily  Dispense: 45 tablet; Refill: 3  - TSH; Future  - T4, Free; Future  - T3; Future  - Hepatic Function Panel; Future  - Thyroid Stimulating Immunoglobulin; Future    2. Thyroid nodule  Per ACR criteria, this requires no additional evaluation. Follow-up and Dispositions    Return in about 2 months (around 2022). History of Present Illness:    THYROID DYSFUNCTION  Ben Langston is seen for new evaluation/management of thyrotoxicosis; this was diagnosed in 2022. Current symptoms:  See review of systems below    Prior treatment: Methimazole 10 mg daily was started 3/19/2022. Pertinent labs:  10/8/2015: TSH 1.400, free T4 1.35, T3 119.  10/3/2016: TSH 2.740.  2018: TSH 1.500.  2022: TSH 0.101, free T4 2.4.  3/16/2022: TSH 0.017.  2022: TSH 11.700. Imagin2022: Ultrasound (Fariha Bull)- Right lobe 3.8 x 1.1 x 1.6 cm, isthmus 0.3 cm, left lobe 4.1 x 1.4 x 2.0 cm. Homogeneous echotexture. 0.3 cm spongiform nodule at the right upper pole. Review of Systems   Constitutional:  Positive for fatigue and unexpected weight change (lost 30 pounds in 6 months). Respiratory:  Positive for shortness of breath. Cardiovascular:  Negative for palpitations. Gastrointestinal:  Negative for constipation and diarrhea. Endocrine: Negative for cold intolerance. Neurological:  Positive for tremors.    Psychiatric/Behavioral: Negative for dysphoric mood and sleep disturbance. The patient is not nervous/anxious. /76 (Site: Left Upper Arm, Position: Sitting)   Pulse 91   Wt 128 lb (58.1 kg)   SpO2 (!) 80%   BMI 20.82 kg/m²   Wt Readings from Last 3 Encounters:   10/31/22 128 lb (58.1 kg)   10/24/22 132 lb (59.9 kg)   10/24/22 132 lb 9.6 oz (60.1 kg)       Physical Exam  HENT:      Head: Normocephalic. Neck:      Thyroid: No thyroid mass or thyromegaly. Cardiovascular:      Rate and Rhythm: Normal rate. Pulmonary:      Effort: No respiratory distress. Breath sounds: Normal breath sounds. Neurological:      Mental Status: She is alert. Psychiatric:         Mood and Affect: Mood normal.         Behavior: Behavior normal.       Orders Placed This Encounter   Procedures    TSH     Standing Status:   Future     Standing Expiration Date:   10/31/2023    T4, Free     Standing Status:   Future     Standing Expiration Date:   10/31/2023    T3     Standing Status:   Future     Standing Expiration Date:   10/31/2023    Hepatic Function Panel     Standing Status:   Future     Standing Expiration Date:   10/31/2023    Thyroid Stimulating Immunoglobulin     Standing Status:   Future     Standing Expiration Date:   10/31/2023         Current Outpatient Medications   Medication Sig Dispense Refill    methIMAzole (TAPAZOLE) 10 MG tablet Take 0.5 tablets by mouth daily 45 tablet 3    azithromycin (ZITHROMAX) 500 MG tablet Take by mouth      HYDROcodone-acetaminophen (NORCO) 5-325 MG per tablet Take 1 tablet by mouth 2 times daily for 30 days. Z51.5 Palliative Care 60 tablet 0    Magic Mouthwash (MIRACLE MOUTHWASH) Swish and swallow 5 mLs 4 times daily as needed for Irritation Equal parts viscous lidocaine, maalox, nystatin, benadryl 480 mL 1    traMADol (ULTRAM) 50 MG tablet Take 2 tablets by mouth 4 times daily for 90 days.  Z51.5 Palliative Care 240 tablet 2    predniSONE (DELTASONE) 5 MG tablet       albuterol (PROVENTIL) (2.5 MG/3ML) 0.083% nebulizer solution TAKE 3 ML BY NEBULIZATION EVERY 6 HOURS AS NEEDED FOR SHORTNESS OF BREATH      tiotropium (SPIRIVA RESPIMAT) 2.5 MCG/ACT AERS inhaler Inhale 2 puffs into the lungs in the morning. 1 each 11    budesonide-formoterol (SYMBICORT) 160-4.5 MCG/ACT AERO Inhale 2 puffs into the lungs in the morning and 2 puffs before bedtime. 10.2 g 11    OXYGEN Inhale into the lungs 2 LPM      lidocaine-prilocaine (EMLA) 2.5-2.5 % cream Apply topically as needed. 30 g 1    ondansetron (ZOFRAN ODT) 4 MG disintegrating tablet Take 1 tablet by mouth every 8 hours as needed for Nausea or Vomiting 90 tablet 1    prochlorperazine (COMPAZINE) 10 MG tablet Take 1 tablet by mouth every 6 hours as needed (chemo-induced nausea) 90 tablet 2    predniSONE (DELTASONE) 10 MG tablet Take 10 mg by mouth daily (with breakfast)      Pseudoephedrine-guaiFENesin (MUCINEX D PO) Take by mouth in the morning and at bedtime      torsemide (DEMADEX) 10 MG tablet Take 10 mg by mouth daily      acetaminophen (TYLENOL) 325 MG tablet Take by mouth every 4 hours as needed      aspirin 81 MG chewable tablet Take 81 mg by mouth daily      atorvastatin (LIPITOR) 80 MG tablet Take 80 mg by mouth at bedtime TAKE 1 TABLET BY MOUTH DAILY      cetirizine (ZYRTEC) 10 MG tablet Take 10 mg by mouth daily as needed      clopidogrel (PLAVIX) 75 MG tablet Take 75 mg by mouth daily      escitalopram (LEXAPRO) 10 MG tablet Take 10 mg by mouth daily      ezetimibe (ZETIA) 10 MG tablet Take 10 mg by mouth at bedtime      metoprolol succinate (TOPROL XL) 100 MG extended release tablet Take 100 mg by mouth daily      nitroGLYCERIN (NITROSTAT) 0.4 MG SL tablet Place 0.4 mg under the tongue 3 times daily as needed      pantoprazole (PROTONIX) 40 MG tablet Take 40 mg by mouth 2 times daily      Roflumilast (DALIRESP) 500 MCG tablet Take 500 mcg by mouth nightly       No current facility-administered medications for this visit.        Dominic Swain MD, FACE      Portions of this note were generated with the assistance of voice recognition software. As such, some errors in transcription may be present.

## 2022-11-01 NOTE — TELEPHONE ENCOUNTER
Adjustments needed for pain control secondary to her radiation therapy. Patient offered her insight and we created a plan. F/U as scheduled.

## 2022-11-09 ENCOUNTER — PATIENT MESSAGE (OUTPATIENT)
Dept: ONCOLOGY | Age: 72
End: 2022-11-09

## 2022-11-16 ENCOUNTER — HOSPITAL ENCOUNTER (OUTPATIENT)
Dept: CT IMAGING | Age: 72
Discharge: HOME | End: 2022-11-19
Payer: MEDICARE

## 2022-11-16 DIAGNOSIS — C34.90 NON-SMALL CELL LUNG CANCER, UNSPECIFIED LATERALITY (HCC): ICD-10-CM

## 2022-11-16 LAB — CREAT BLD-MCNC: 0.85 MG/DL (ref 0.8–1.5)

## 2022-11-16 PROCEDURE — 2580000003 HC RX 258: Performed by: INTERNAL MEDICINE

## 2022-11-16 PROCEDURE — 6360000004 HC RX CONTRAST MEDICATION: Performed by: INTERNAL MEDICINE

## 2022-11-16 PROCEDURE — 74177 CT ABD & PELVIS W/CONTRAST: CPT

## 2022-11-16 PROCEDURE — 82565 ASSAY OF CREATININE: CPT

## 2022-11-16 RX ORDER — 0.9 % SODIUM CHLORIDE 0.9 %
100 INTRAVENOUS SOLUTION INTRAVENOUS ONCE
Status: COMPLETED | OUTPATIENT
Start: 2022-11-16 | End: 2022-11-16

## 2022-11-16 RX ORDER — SODIUM CHLORIDE 0.9 % (FLUSH) 0.9 %
10 SYRINGE (ML) INJECTION
Status: COMPLETED | OUTPATIENT
Start: 2022-11-16 | End: 2022-11-16

## 2022-11-16 RX ADMIN — SODIUM CHLORIDE 100 ML: 9 INJECTION, SOLUTION INTRAVENOUS at 11:17

## 2022-11-16 RX ADMIN — IOPAMIDOL 100 ML: 755 INJECTION, SOLUTION INTRAVENOUS at 11:17

## 2022-11-16 RX ADMIN — DIATRIZOATE MEGLUMINE AND DIATRIZOATE SODIUM 15 ML: 660; 100 LIQUID ORAL; RECTAL at 11:17

## 2022-11-16 RX ADMIN — SODIUM CHLORIDE, PRESERVATIVE FREE 10 ML: 5 INJECTION INTRAVENOUS at 11:17

## 2022-11-17 ENCOUNTER — OFFICE VISIT (OUTPATIENT)
Dept: ONCOLOGY | Age: 72
End: 2022-11-17
Payer: MEDICARE

## 2022-11-17 ENCOUNTER — HOSPITAL ENCOUNTER (OUTPATIENT)
Dept: INFUSION THERAPY | Age: 72
Discharge: HOME | End: 2022-11-17
Payer: MEDICARE

## 2022-11-17 ENCOUNTER — CLINICAL DOCUMENTATION (OUTPATIENT)
Dept: ONCOLOGY | Age: 72
End: 2022-11-17

## 2022-11-17 ENCOUNTER — RESEARCH ENCOUNTER (OUTPATIENT)
Dept: RESEARCH | Age: 72
End: 2022-11-17

## 2022-11-17 VITALS
WEIGHT: 130.4 LBS | TEMPERATURE: 97.5 F | BODY MASS INDEX: 20.96 KG/M2 | HEIGHT: 66 IN | DIASTOLIC BLOOD PRESSURE: 57 MMHG | RESPIRATION RATE: 21 BRPM | HEART RATE: 79 BPM | SYSTOLIC BLOOD PRESSURE: 105 MMHG | OXYGEN SATURATION: 94 %

## 2022-11-17 DIAGNOSIS — D64.9 ANEMIA, UNSPECIFIED TYPE: ICD-10-CM

## 2022-11-17 DIAGNOSIS — C34.90 NON-SMALL CELL LUNG CANCER, UNSPECIFIED LATERALITY (HCC): Primary | ICD-10-CM

## 2022-11-17 DIAGNOSIS — C34.90 NON-SMALL CELL LUNG CANCER, UNSPECIFIED LATERALITY (HCC): ICD-10-CM

## 2022-11-17 DIAGNOSIS — Z79.899 HIGH RISK MEDICATION USE: ICD-10-CM

## 2022-11-17 DIAGNOSIS — C78.1 MEDIASTINAL METASTASIS (HCC): ICD-10-CM

## 2022-11-17 LAB
ALBUMIN SERPL-MCNC: 3.4 G/DL (ref 3.2–4.6)
ALBUMIN/GLOB SERPL: 0.9 {RATIO} (ref 0.4–1.6)
ALP SERPL-CCNC: 53 U/L (ref 50–136)
ALT SERPL-CCNC: 20 U/L (ref 12–65)
ANION GAP SERPL CALC-SCNC: 5 MMOL/L (ref 2–11)
AST SERPL-CCNC: 17 U/L (ref 15–37)
BASOPHILS # BLD: 0.1 K/UL (ref 0–0.2)
BASOPHILS NFR BLD: 1 % (ref 0–2)
BILIRUB SERPL-MCNC: 0.3 MG/DL (ref 0.2–1.1)
BUN SERPL-MCNC: 19 MG/DL (ref 8–23)
CALCIUM SERPL-MCNC: 9.1 MG/DL (ref 8.3–10.4)
CHLORIDE SERPL-SCNC: 101 MMOL/L (ref 101–110)
CO2 SERPL-SCNC: 31 MMOL/L (ref 21–32)
CORTIS BS SERPL-MCNC: 2.3 UG/DL
CREAT SERPL-MCNC: 1 MG/DL (ref 0.6–1)
DIFFERENTIAL METHOD BLD: ABNORMAL
EOSINOPHIL # BLD: 0.1 K/UL (ref 0–0.8)
EOSINOPHIL NFR BLD: 1 % (ref 0.5–7.8)
ERYTHROCYTE [DISTWIDTH] IN BLOOD BY AUTOMATED COUNT: 18.5 % (ref 11.9–14.6)
GLOBULIN SER CALC-MCNC: 3.6 G/DL (ref 2.8–4.5)
GLUCOSE SERPL-MCNC: 110 MG/DL (ref 65–100)
HBV SURFACE AB SERPL IA-ACNC: <3.1 MIU/ML
HBV SURFACE AG SER QL: NONREACTIVE
HCT VFR BLD AUTO: 31.5 % (ref 35.8–46.3)
HGB BLD-MCNC: 10.1 G/DL (ref 11.7–15.4)
IMM GRANULOCYTES # BLD AUTO: 0 K/UL (ref 0–0.5)
IMM GRANULOCYTES NFR BLD AUTO: 1 % (ref 0–5)
LYMPHOCYTES # BLD: 0.6 K/UL (ref 0.5–4.6)
LYMPHOCYTES NFR BLD: 8 % (ref 13–44)
MAGNESIUM SERPL-MCNC: 2 MG/DL (ref 1.8–2.4)
MCH RBC QN AUTO: 31.4 PG (ref 26.1–32.9)
MCHC RBC AUTO-ENTMCNC: 32.1 G/DL (ref 31.4–35)
MCV RBC AUTO: 97.8 FL (ref 82–102)
MONOCYTES # BLD: 1.1 K/UL (ref 0.1–1.3)
MONOCYTES NFR BLD: 15 % (ref 4–12)
NEUTS SEG # BLD: 5.3 K/UL (ref 1.7–8.2)
NEUTS SEG NFR BLD: 74 % (ref 43–78)
NRBC # BLD: 0 K/UL (ref 0–0.2)
PLATELET # BLD AUTO: 273 K/UL (ref 150–450)
PMV BLD AUTO: 8.9 FL (ref 9.4–12.3)
POTASSIUM SERPL-SCNC: 3.4 MMOL/L (ref 3.5–5.1)
PROT SERPL-MCNC: 7 G/DL (ref 6.3–8.2)
RBC # BLD AUTO: 3.22 M/UL (ref 4.05–5.2)
SODIUM SERPL-SCNC: 137 MMOL/L (ref 133–143)
TSH, 3RD GENERATION: 7.21 UIU/ML (ref 0.36–3)
WBC # BLD AUTO: 7.2 K/UL (ref 4.3–11.1)

## 2022-11-17 PROCEDURE — 6360000002 HC RX W HCPCS: Performed by: INTERNAL MEDICINE

## 2022-11-17 PROCEDURE — 1090F PRES/ABSN URINE INCON ASSESS: CPT | Performed by: INTERNAL MEDICINE

## 2022-11-17 PROCEDURE — 86706 HEP B SURFACE ANTIBODY: CPT

## 2022-11-17 PROCEDURE — 2580000003 HC RX 258: Performed by: INTERNAL MEDICINE

## 2022-11-17 PROCEDURE — 36591 DRAW BLOOD OFF VENOUS DEVICE: CPT

## 2022-11-17 PROCEDURE — 3017F COLORECTAL CA SCREEN DOC REV: CPT | Performed by: INTERNAL MEDICINE

## 2022-11-17 PROCEDURE — 87340 HEPATITIS B SURFACE AG IA: CPT

## 2022-11-17 PROCEDURE — 80053 COMPREHEN METABOLIC PANEL: CPT

## 2022-11-17 PROCEDURE — 1123F ACP DISCUSS/DSCN MKR DOCD: CPT | Performed by: INTERNAL MEDICINE

## 2022-11-17 PROCEDURE — 3078F DIAST BP <80 MM HG: CPT | Performed by: INTERNAL MEDICINE

## 2022-11-17 PROCEDURE — G8427 DOCREV CUR MEDS BY ELIG CLIN: HCPCS | Performed by: INTERNAL MEDICINE

## 2022-11-17 PROCEDURE — G8420 CALC BMI NORM PARAMETERS: HCPCS | Performed by: INTERNAL MEDICINE

## 2022-11-17 PROCEDURE — 96413 CHEMO IV INFUSION 1 HR: CPT

## 2022-11-17 PROCEDURE — 84443 ASSAY THYROID STIM HORMONE: CPT

## 2022-11-17 PROCEDURE — 86704 HEP B CORE ANTIBODY TOTAL: CPT

## 2022-11-17 PROCEDURE — 83735 ASSAY OF MAGNESIUM: CPT

## 2022-11-17 PROCEDURE — 3074F SYST BP LT 130 MM HG: CPT | Performed by: INTERNAL MEDICINE

## 2022-11-17 PROCEDURE — 85025 COMPLETE CBC W/AUTO DIFF WBC: CPT

## 2022-11-17 PROCEDURE — G8484 FLU IMMUNIZE NO ADMIN: HCPCS | Performed by: INTERNAL MEDICINE

## 2022-11-17 PROCEDURE — 1036F TOBACCO NON-USER: CPT | Performed by: INTERNAL MEDICINE

## 2022-11-17 PROCEDURE — 99215 OFFICE O/P EST HI 40 MIN: CPT | Performed by: INTERNAL MEDICINE

## 2022-11-17 PROCEDURE — G8400 PT W/DXA NO RESULTS DOC: HCPCS | Performed by: INTERNAL MEDICINE

## 2022-11-17 PROCEDURE — 82533 TOTAL CORTISOL: CPT

## 2022-11-17 RX ORDER — ONDANSETRON 2 MG/ML
8 INJECTION INTRAMUSCULAR; INTRAVENOUS
Status: CANCELLED | OUTPATIENT
Start: 2022-11-17

## 2022-11-17 RX ORDER — MEPERIDINE HYDROCHLORIDE 50 MG/ML
12.5 INJECTION INTRAMUSCULAR; INTRAVENOUS; SUBCUTANEOUS PRN
Status: CANCELLED | OUTPATIENT
Start: 2022-11-17

## 2022-11-17 RX ORDER — FAMOTIDINE 10 MG/ML
20 INJECTION, SOLUTION INTRAVENOUS
Status: CANCELLED | OUTPATIENT
Start: 2022-11-17

## 2022-11-17 RX ORDER — ALBUTEROL SULFATE 90 UG/1
4 AEROSOL, METERED RESPIRATORY (INHALATION) PRN
Status: CANCELLED | OUTPATIENT
Start: 2022-11-17

## 2022-11-17 RX ORDER — MEPERIDINE HYDROCHLORIDE 25 MG/ML
12.5 INJECTION INTRAMUSCULAR; INTRAVENOUS; SUBCUTANEOUS PRN
Status: DISCONTINUED | OUTPATIENT
Start: 2022-11-17 | End: 2022-11-18 | Stop reason: HOSPADM

## 2022-11-17 RX ORDER — SODIUM CHLORIDE 9 MG/ML
INJECTION, SOLUTION INTRAVENOUS CONTINUOUS
Status: DISCONTINUED | OUTPATIENT
Start: 2022-11-17 | End: 2022-11-18 | Stop reason: HOSPADM

## 2022-11-17 RX ORDER — DIPHENHYDRAMINE HYDROCHLORIDE 50 MG/ML
50 INJECTION INTRAMUSCULAR; INTRAVENOUS
Status: DISCONTINUED | OUTPATIENT
Start: 2022-11-17 | End: 2022-11-18 | Stop reason: HOSPADM

## 2022-11-17 RX ORDER — SODIUM CHLORIDE 0.9 % (FLUSH) 0.9 %
10 SYRINGE (ML) INJECTION PRN
Status: DISCONTINUED | OUTPATIENT
Start: 2022-11-17 | End: 2022-11-18 | Stop reason: HOSPADM

## 2022-11-17 RX ORDER — SODIUM CHLORIDE 9 MG/ML
INJECTION, SOLUTION INTRAVENOUS CONTINUOUS
Status: CANCELLED | OUTPATIENT
Start: 2022-11-17

## 2022-11-17 RX ORDER — DIPHENHYDRAMINE HYDROCHLORIDE 50 MG/ML
50 INJECTION INTRAMUSCULAR; INTRAVENOUS
Status: CANCELLED | OUTPATIENT
Start: 2022-11-17

## 2022-11-17 RX ORDER — ACETAMINOPHEN 325 MG/1
650 TABLET ORAL
Status: CANCELLED | OUTPATIENT
Start: 2022-11-17

## 2022-11-17 RX ORDER — SODIUM CHLORIDE 9 MG/ML
5-250 INJECTION, SOLUTION INTRAVENOUS PRN
Status: CANCELLED | OUTPATIENT
Start: 2022-11-17

## 2022-11-17 RX ORDER — EPINEPHRINE 1 MG/ML
0.3 INJECTION, SOLUTION, CONCENTRATE INTRAVENOUS PRN
Status: CANCELLED | OUTPATIENT
Start: 2022-11-17

## 2022-11-17 RX ORDER — ONDANSETRON 2 MG/ML
8 INJECTION INTRAMUSCULAR; INTRAVENOUS
Status: DISCONTINUED | OUTPATIENT
Start: 2022-11-17 | End: 2022-11-18 | Stop reason: HOSPADM

## 2022-11-17 RX ORDER — SODIUM CHLORIDE 0.9 % (FLUSH) 0.9 %
5-40 SYRINGE (ML) INJECTION PRN
Status: DISCONTINUED | OUTPATIENT
Start: 2022-11-17 | End: 2022-11-18 | Stop reason: HOSPADM

## 2022-11-17 RX ORDER — SODIUM CHLORIDE 0.9 % (FLUSH) 0.9 %
5-40 SYRINGE (ML) INJECTION PRN
Status: CANCELLED | OUTPATIENT
Start: 2022-11-17

## 2022-11-17 RX ORDER — EPINEPHRINE 1 MG/ML
0.3 INJECTION, SOLUTION, CONCENTRATE INTRAVENOUS PRN
Status: DISCONTINUED | OUTPATIENT
Start: 2022-11-17 | End: 2022-11-18 | Stop reason: HOSPADM

## 2022-11-17 RX ORDER — HEPARIN SODIUM (PORCINE) LOCK FLUSH IV SOLN 100 UNIT/ML 100 UNIT/ML
500 SOLUTION INTRAVENOUS PRN
Status: CANCELLED | OUTPATIENT
Start: 2022-11-17

## 2022-11-17 RX ORDER — ACETAMINOPHEN 325 MG/1
650 TABLET ORAL
Status: DISCONTINUED | OUTPATIENT
Start: 2022-11-17 | End: 2022-11-18 | Stop reason: HOSPADM

## 2022-11-17 RX ORDER — SODIUM CHLORIDE 9 MG/ML
5-40 INJECTION INTRAVENOUS PRN
Status: CANCELLED | OUTPATIENT
Start: 2022-11-17

## 2022-11-17 RX ORDER — SODIUM CHLORIDE 9 MG/ML
5-250 INJECTION, SOLUTION INTRAVENOUS PRN
Status: DISCONTINUED | OUTPATIENT
Start: 2022-11-17 | End: 2022-11-18 | Stop reason: HOSPADM

## 2022-11-17 RX ADMIN — SODIUM CHLORIDE, PRESERVATIVE FREE 10 ML: 5 INJECTION INTRAVENOUS at 10:33

## 2022-11-17 RX ADMIN — SODIUM CHLORIDE, PRESERVATIVE FREE 10 ML: 5 INJECTION INTRAVENOUS at 12:31

## 2022-11-17 RX ADMIN — SODIUM CHLORIDE 100 ML/HR: 9 INJECTION, SOLUTION INTRAVENOUS at 12:32

## 2022-11-17 RX ADMIN — SODIUM CHLORIDE 590 MG: 9 INJECTION, SOLUTION INTRAVENOUS at 12:46

## 2022-11-17 ASSESSMENT — PATIENT HEALTH QUESTIONNAIRE - PHQ9
2. FEELING DOWN, DEPRESSED OR HOPELESS: 0
1. LITTLE INTEREST OR PLEASURE IN DOING THINGS: 0
SUM OF ALL RESPONSES TO PHQ9 QUESTIONS 1 & 2: 0
SUM OF ALL RESPONSES TO PHQ QUESTIONS 1-9: 0

## 2022-11-17 NOTE — ONCOLOGY
DISCONTINUE ON PATHWAY REGIMEN - Non-Small Cell Lung    [id number]        Paclitaxel (Taxol)       Carboplatin     **Always confirm dose/schedule in your pharmacy ordering system**    REASON: Continuation Of Treatment  PRIOR TREATMENT: [id number]  TREATMENT RESPONSE: Partial Response (HI)    START ON PATHWAY REGIMEN - Non-Small Cell Lung    [id number]        Durvalumab (Imfinzi)     **Always confirm dose/schedule in your pharmacy ordering system**    Patient Characteristics:  Preoperative or Nonsurgical Candidate (Clinical Staging), Stage III - Nonsurgical Candidate (Nonsquamous and Squamous), PS = 0, 1  Therapeutic Status: Preoperative or Nonsurgical Candidate (Clinical Staging)  AJCC T Category: cT2b  AJCC N Category: cN2  AJCC M Category: cM0  AJCC 8 Stage Grouping: IIIA  ECOG Performance Status: 1  Intent of Therapy:  Curative Intent, Discussed with Patient

## 2022-11-17 NOTE — RESEARCH
To med onc clinic to discuss Paradigm trial with pt. Pt accompanied by 2 family members. Dr Krystina Hernandez after educating pt on their Immunotherapy treatment plan, introduced and explained trial to pt and family. Research followed explaining trial participation requirements in more detail, and answering all questions. Pt declined to participate.

## 2022-11-17 NOTE — PATIENT INSTRUCTIONS
Patient Instructions from Today's Visit    Reason for Visit:  Pre-chemo visit, c7 Carbo/Taxol    Diagnosis Information:  [email or web address] [email or web address]-us/asco-answers-patient-education-materials/afsc-ibqrvsb-srja-sheets    Plan:  Reviewed CT scan results        Follow Up:        Recent Lab Results:  Hospital Outpatient Visit on 11/17/2022   Component Date Value Ref Range Status    Magnesium 11/17/2022 2.0  1.8 - 2.4 mg/dL Final    Sodium 11/17/2022 137  133 - 143 mmol/L Final    Potassium 11/17/2022 3.4 (A)  3.5 - 5.1 mmol/L Final    Chloride 11/17/2022 101  101 - 110 mmol/L Final    CO2 11/17/2022 31  21 - 32 mmol/L Final    Anion Gap 11/17/2022 5  2 - 11 mmol/L Final    Glucose 11/17/2022 110 (A)  65 - 100 mg/dL Final    BUN 11/17/2022 19  8 - 23 MG/DL Final    Creatinine 11/17/2022 1.00  0.6 - 1.0 MG/DL Final    Est, Glom Filt Rate 11/17/2022 59 (A)  >60 ml/min/1.73m2 Final    Comment:      Pediatric calculator link: [email or web address] [email or web address]/kdoqi/gfr_[email or web address]       Effective Oct 3, 2022       These results are not intended for use in patients <25years of age. eGFR results are calculated without a race factor using  the 2021 CKD-EPI equation. Careful clinical correlation is recommended, particularly when comparing to results calculated using previous equations. The CKD-EPI equation is less accurate in patients with extremes of muscle mass, extra-renal metabolism of creatinine, excessive creatine ingestion, or following therapy that affects renal tubular secretion.       Calcium 11/17/2022 9.1  8.3 - 10.4 MG/DL Final    Total Bilirubin 11/17/2022 0.3  0.2 - 1.1 MG/DL Final    ALT 11/17/2022 20  12 - 65 U/L Final    AST 11/17/2022 17  15 - 37 U/L Final    Alk Phosphatase 11/17/2022 53  50 - 136 U/L Final    Total Protein 11/17/2022 7.0  6.3 - 8.2 g/dL Final    Albumin 11/17/2022 3.4  3.2 - 4.6 g/dL Final    Globulin 11/17/2022 3.6  2.8 - 4.5 g/dL Final    Albumin/Globulin Ratio 11/17/2022 0.9  0.4 - 1.6   Final    WBC 11/17/2022 7.2  4.3 - 11.1 K/uL Final    RBC 11/17/2022 3.22 (A)  4.05 - 5.2 M/uL Final    Hemoglobin 11/17/2022 10.1 (A)  11.7 - 15.4 g/dL Final    Hematocrit 11/17/2022 31.5 (A)  35.8 - 46.3 % Final    MCV 11/17/2022 97.8  82.0 - 102.0 FL Final    MCH 11/17/2022 31.4  26.1 - 32.9 PG Final    MCHC 11/17/2022 32.1  31.4 - 35.0 g/dL Final    RDW 11/17/2022 18.5 (A)  11.9 - 14.6 % Final    Platelets 33/33/7502 273  150 - 450 K/uL Final    MPV 11/17/2022 8.9 (A)  9.4 - 12.3 FL Final    nRBC 11/17/2022 0.00  0.0 - 0.2 K/uL Final    **Note: Absolute NRBC parameter is now reported with Hemogram**    Seg Neutrophils 11/17/2022 74  43 - 78 % Final    Lymphocytes 11/17/2022 8 (A)  13 - 44 % Final    Monocytes 11/17/2022 15 (A)  4.0 - 12.0 % Final    Eosinophils % 11/17/2022 1  0.5 - 7.8 % Final    Basophils 11/17/2022 1  0.0 - 2.0 % Final    Immature Granulocytes 11/17/2022 1  0.0 - 5.0 % Final    Segs Absolute 11/17/2022 5.3  1.7 - 8.2 K/UL Final    Absolute Lymph # 11/17/2022 0.6  0.5 - 4.6 K/UL Final    Absolute Mono # 11/17/2022 1.1  0.1 - 1.3 K/UL Final    Absolute Eos # 11/17/2022 0.1  0.0 - 0.8 K/UL Final    Basophils Absolute 11/17/2022 0.1  0.0 - 0.2 K/UL Final    Absolute Immature Granulocyte 11/17/2022 0.0  0.0 - 0.5 K/UL Final    Differential Type 11/17/2022 AUTOMATED    Final   Hospital Outpatient Visit on 11/16/2022   Component Date Value Ref Range Status    POC Creatinine 11/16/2022 0.85  0.8 - 1.5 mg/dL Final    eGFR, POC 11/16/2022 >60  >60 ml/min/1.73m2 Final    Comment:      Pediatric calculator link: [email or web address] org/professionals/kdoqi/gfr_calculatorped       Effective Oct 3, 2022       These results are not intended for use in patients <25years of age. eGFR results are calculated without a race factor using  the 2021 CKD-EPI equation. Careful clinical correlation is recommended, particularly when comparing to results calculated using previous equations.   The CKD-EPI equation is less accurate in patients with extremes of muscle mass, extra-renal metabolism of creatinine, excessive creatine ingestion, or following therapy that affects renal tubular secretion. Treatment Summary has been discussed and given to patient: no  -------------------------------------------------------------------------------------------------------------------  Please call our office at [phone] if you have any  of the following symptoms:   Fever of 100.5 or greater  Chills  Shortness of breath  Swelling or pain in one leg    After office hours an answering service is available and will contact a provider for emergencies or if you are experiencing any of the above symptoms. Patient does express an interest in My Chart. My Chart log in information explained on the after visit summary printout at the [facility]. Farrukh Guadalupe RN  Solid Tumor Nurse Navigator  [phone] cell phone   For Urgent Matters (after hours and weekends), please call Triage at [phone]. For Emergencies, please call 911. No special premeds  Baseline TSH and free T4 at start of treatment and every 2-3 months during treatment  PORT recommended due to duration of therapy  Restaging scans about every 3 months.      Your Chemotherapy Plan      Diagnosis: Non-small cell lung cancer    Goal of Therapy:    _ X_Curative Intent        Name of Chemotherapy medications: durvalumab (imfinzi)    Number of Cycles Planned: 2 years                  Length of Cycle:  every 4 weeks      Chemotherapy plan is subject to change at your provider's discretion    A copy of this plan has been discussed and given to patient by Farrukh Guadalupe RN-Nurse Navigator    Chemo Education:   Scheduled  11/17/2022  Previously reviewed 11/17/2022    Consent for therapy:   Completed on 11/17/2022    Important things to remember about the side effects of durvalumab:  Most people will not experience all of the durvalumab side effects listed. Side effects are often predictable in terms of their onset, duration, and severity. Side effects are almost always reversible and will go away after therapy is complete. Side effects may be quite manageable. There are many options to minimize or prevent side effects of durvalumab. Side Effects may include:  Fatigue  Infection  These are less common side effects (occurring in 10-29%) for patients receiving durvalumab:  Muscle and/or bone pain  Constipation  Decreased appetite  Rash  Nausea  Fever    Will have an education session prior to starting treatment. Chemotherapy/Agent Information:     Diagnosis: Non-Small Cell Lung Cancer    Goal of Therapy: _X_Curative         Name of Chemotherapy medications: Durvalumab    Number of Cycles Planned: 25 Length of Cycle:  every 2 week      Chemotherapy plan is subject to change at your provider's discretion    A copy of this plan has been discussed and given to Jennifer Barreto by [employer].     Chemo Education:   Scheduled  11/17/2022  Previously reviewed 11/17/2022    Consent for therapy:   Completed on 11/17/2022

## 2022-11-17 NOTE — PROGRESS NOTES
[facility] Hematology & Oncology: Office Visit Progress Note    Chief Complaint:    Non-small cell lung cancer stage III    History of Present Illness:  Reason for Referral:  Non-small cell lung cancer     Referring Provider:  Dr. July Marcelo, [facility] Pulmonary and Critical Care     Primary Care Provider:  Dr. Jamilah Nunez, [facility])     Family History of Cancer/Hematologic Disorders:  No documented family history     Presenting Symptoms:   Presented to [facility] ED with COPD exacerbation     Ms. Sara Medrano is a 67 y.o.  female with a medical history of COPD, asthma, oxygen dependent, CAD, closed right hip and femur fracture (2/22), subdural hematoma (3/22), MI, and thyroid disease. Surgical history includes bilateral cataract removal, right hip surgery, and right carotid endarterectomy. She is a former smoker of 50 years but quit in 2017. Denies alcohol use. On 6/9/22 patient presented to the [facility] ED with increased shortness of breath over several days. Admitted with exacerbation of COPD. CXR showed unchanged consolidation of left lower lobe lung. On 6/11 CT Chest PE protocol was performed which revealed no PE but a increase in the left lower lobe lung mass. She was discharged home on 6/12 with instruction to follow up with pulmonary within 1 week. On 6/24/22 she had a PET scan which showed known lung mass and left hilar and subcarinal mediastinal activity as well. On 6/28/22 she was seen in follow up with pulmonary. PET imaging reviewed. Recommended EBUS. Procedure was performed on 7/20/22. Pathology showed malignant cells 11L lymph node. Referral to medical and radiation oncology for evaluation and treatment recommendations.       MRI Brain scheduled on 8/2 and radiation oncology consult on 7/29.       6/9/22 [facility] ED CXR  FINDINGS: The lungs are hyperexpanded, consistent with emphysematous changes on the prior CT chest. Areas of nodular consolidation in the left lower lobe are unchanged. No acute infiltrate or pulmonary edema is seen. The heart size, mediastinal contour and pulmonary vasculature are normal. There are coronary artery calcifications or stents. The thorax or pleural effusion is seen. IMPRESSION:  1. Emphysema. 2. Coronary artery disease. 3. Unchanged nodular consolidation in the left lower lobe. 6/11/22 [facility] CT Chest PE protocol  Findings: There is no pleural or pericardial effusion. The heart and great vessels are unremarkable. There are no filling defects within the pulmonary arteries to suggest pulmonary emboli. Moderate emphysematous changes are present. Patchy interstitial opacity is present within the right upper lobe  anteriorly. Similar but less impressive findings are newly visualized within the left upper lobe anteriorly. Again noted is the ill-defined masslike opacity within the left lower lobe measuring approximately 2.0 x 3.6 cm previously measured at approximately 1.7 x 2.8 cm. Also is a ground glass opacity within the left upper lobe measuring 1.5 cm, unchanged when remeasured on the previous study. A left hilar lymph node  measures 1.4 cm in short axis, unchanged. There is a small left Bochdalek hernia containing fat. Imaging of the upper abdomen is unremarkable. There are compression fractures within the thoracic and lumbar spine with mild height loss. The fractures at T10 and T11 has occurred in the interim. IMPRESSION:  1. No evidence of pulmonary embolism. 2. Interval increase in left lower lobe mass suspicious for bronchogenic carcinoma. A left hilar lymph node also is enlarged concerning for metastatic adenopathy. 3. Newly visualized interstitial opacities within the upper lobes, right greater than left, most likely infectious. 4. Stable left lower lobe groundglass opacity. 5.  Interval compression fractures with mild height loss at T10 and T11.     6/24/22 [facility] PET  FINDINGS:  NECK/CHEST:  No suspicious activity is seen in the neck. Only minimal nonfocal activity is seen about left posterior facets in the mid cervical spine likely related to benign degenerative changes. No clear aggressive osseous lesion is suggested on limited CT images. The most clear suspicious activity is seen associated with a large heterogeneous lesion in the left lower lobe demonstrating spiculated appearing components best appreciated on axial image 92 measuring 4.4 cm x 2.3 cm in greatest transverse dimensions and with a maximum SUV of 11.2 g/mL. Malignancy is not excluded given the appearance. Additional focal appearing activity is seen in the left hilum on  pet/CT image 85 with a maximum SUV of 9.1 g/mL and in the subcarinal mediastinum on axial image 84 with a maximum SUV of 6.3 g/mL concerning for left hilar and subcarinal mediastinal idego metastases, respectively. Additional mild activity is seen in the mediastinum and left hilum on pet/CT image 76 which does appear to correspond to small prevascular and left hilar lymph nodes seen on axial CT  image 39 of the prior the chest dated 6/11/2022 which are suspicious for additional early diego metastases. No clearly suspicious activity is otherwise seen in the chest. Mild to moderate activity is seen in the medial right upper lobe on pet/CT image 56 with a maximum SUV of 4.8 g/mL. Although the degree of activity is suspicious, this appears to correspond to a fluid containing cystic structure in the right upper lobe with a CT appearance being more suggestive of an inflammatory process. Additional soft tissue activity is seen over the lateral scapula which is most consistent with benign muscle activity. ABDOMEN/PELVIS:  The only potentially suspicious activity is skin activity involving the left  proximal medial thigh seen on pet/CT image 257 with a maximum SUV of 6.3 g/mL which appears to be associated with focal skin thickening.  This is not felt to be related to the patient's lung cancer. However, a dermal neoplasm or focal  inflammatory process is not excluded given the appearance. This should be further assessed with direct visualization. No suspicious activity is otherwise seen below the diaphragms. Activity is seen adjacent to the right femoral neck. However, this appears to correspond to heterotopic bone formation from hip  surgery at this level without aggressive features suggested therefore is not worrisome. No focal hepatic activity is seen. No contour deforming, or hypermetabolic adrenal lesion is seen. Advanced atherosclerotic calcification is seen of the abdominal aorta. IMPRESSION:  1. Heterogeneous left lower lobe mass demonstrating irregular spiculated components and measuring 4.4 cm x 2.3 cm in greatest transverse dimensions and with a maximum SUV of 11.2 g/mL. Malignancy is not excluded given the appearance. Further evaluation as clinically indicated is recommended. 2. Additional left hilar and subcarinal mediastinal activity concerning for diego metastases. Additional mild activity is seen within a prevascular mediastinal lymph node an additional left hilar lymph nodes for which additional early diego metastases are not excluded. 3. Focal activity associated with focal skin thickening in the most medial, proximal left thigh seen on pet/CT image 257. This is unlikely to be related to potential lung cancer although could represent an additional dermal neoplasm or focal abnormal inflammatory process otherwise. This should be clinically  evaluated with direct visualization. This has been marked on image to assist with clinical correlation. 4. Mild activity associated with a cystic abnormality in the medial right upper lobe. Although the degree of activity is suspicious (maximum SUV of 4.8 g/mL) the CT features are more consistent with a potential inflammatory process. Attention on follow-up studies is recommended. 7/20/22 [facility] Pathology EBUS  DIAGNOSIS  11L Lymph Node, Fine Needle Aspiration:      MALIGNANT CELLS PRESENT. Cell block: Malignant cells present. Immunohistochemical findings will follow in an addendum. Notes from Referring Provider:  None     Other Pertinent Information:  Covid vaccination - 1/28/21, 2/18/21, and 9/25/21     Radiation oncology consult with Dr. Jose Maloney scheduled for 7/29/22 at 1 pm.     MRI Brain is scheduled for 8/2/22 at 8 am.         Review of Systems:  Constitutional Fatigue. Denies fever or chills. Denies weight loss or appetite changes. Denies anorexia. HEENT Denies trauma, bluring vision, hearing loss, ear pain, nosebleeds, sore throat, neck pain and ear discharge. Skin Denies lesions or rashes. Lungs Denies shortness of breath, cough, sputum production or hemoptysis. Cardiovascular Denies chest pain, palpitations, orthopnea, claudication and leg swelling. Gastrointestinal Odynophagia. Denies nausea, vomiting, bowel changes. Denies bloody or black stools. Denies abdominal pain.  Denies dysuria, frequency or hesitancy of urination   Neuro Denies headaches, visual changes or ataxia. Denies dizziness, tingling, tremors, sensory change, speech change, focal weakness and headaches. Hematology Denies nasal/gum bleeding, denies easy bruise   Endo Denies heat/cold intolerance, denies diabetes. MSK Denies back pain, swollen legs, myalgias and falls. Psychiatric/Behavioral Denies depression and substance abuse. The patient is not nervous/anxious.        Allergies   Allergen Reactions    Promethazine Nausea And Vomiting and Other (See Comments)     Severe vomiting      Past Medical History:   Diagnosis Date    Asthma     Closed right hip fracture ([address] 02/2022    Coronary artery disease     Former cigarette smoker     Fracture of right femur ([address] 02/2022    Hyperthyroidism     Lung mass     Multiple closed anterior-posterior compression fractures of pelvis ([address] 2022    Non-small cell lung cancer (HCC)     Severe chronic obstructive pulmonary disease (HCC)     Subdural hematoma 2022    Thyroid nodule      Past Surgical History:   Procedure Laterality Date    BRONCHOSCOPY N/A 2022    BRONCHOSCOPY ENDOBRONCHIAL ULTRASOUND performed by Kolby Otero MD at [facility] ENDOSCOPY    CAROTID ENDARTERECTOMY Right     CATARACT REMOVAL Bilateral     CORONARY STENT PLACEMENT  2022    HIP FRACTURE SURGERY Right 2022    IR PORT PLACEMENT EQUAL OR GREATER THAN 5 YEARS  2022    IR PORT PLACEMENT EQUAL OR GREATER THAN 5 YEARS 2022 [facility] SPE[facility]    WISDOM TOOTH EXTRACTION      as a teenager     Family History   Adopted: Yes   Problem Relation Age of Onset    No Known Problems Mother     No Known Problems Father     Thyroid Disease Daughter      Social History     Socioeconomic History    Marital status:      Spouse name: Not on file    Number of children: Not on file    Years of education: Not on file    Highest education level: Not on file   Occupational History    Not on file   Tobacco Use    Smoking status: Former     Packs/day: 0.50     Years: 50.00     Pack years: 25.00     Types: Cigarettes     Quit date:      Years since quittin.8    Smokeless tobacco: Never   Substance and Sexual Activity    Alcohol use: Not Currently    Drug use: Not on file    Sexual activity: Not on file   Other Topics Concern    Not on file   Social History Narrative    Not on file     Social Determinants of Health     Financial Resource Strain: Not on file   Food Insecurity: Not on file   Transportation Needs: Not on file   Physical Activity: Not on file   Stress: Not on file   Social Connections: Not on file   Intimate Partner Violence: Not on file   Housing Stability: Not on file     Current Outpatient Medications   Medication Sig Dispense Refill    methIMAzole (TAPAZOLE) 10 MG tablet Take 0.5 tablets by mouth daily 45 tablet 3    azithromycin (ZITHROMAX) 500 MG tablet Take by mouth      HYDROcodone-acetaminophen (NORCO) 5-325 MG per tablet Take 1 tablet by mouth 2 times daily for 30 days. Z51.5 Palliative Care 60 tablet 0    Magic Mouthwash (MIRACLE MOUTHWASH) Swish and swallow 5 mLs 4 times daily as needed for Irritation Equal parts viscous lidocaine, maalox, nystatin, benadryl 480 mL 1    traMADol (ULTRAM) 50 MG tablet Take 2 tablets by mouth 4 times daily for 90 days. Z51.5 Palliative Care 240 tablet 2    predniSONE (DELTASONE) 5 MG tablet       albuterol (PROVENTIL) (2.5 MG/3ML) 0.083% nebulizer solution TAKE 3 ML BY NEBULIZATION EVERY 6 HOURS AS NEEDED FOR SHORTNESS OF BREATH      tiotropium (SPIRIVA RESPIMAT) 2.5 MCG/ACT AERS inhaler Inhale 2 puffs into the lungs in the morning. 1 each 11    budesonide-formoterol (SYMBICORT) 160-4.5 MCG/ACT AERO Inhale 2 puffs into the lungs in the morning and 2 puffs before bedtime. 10.2 g 11    OXYGEN Inhale into the lungs 2 LPM      lidocaine-prilocaine (EMLA) 2.5-2.5 % cream Apply topically as needed.  30 g 1    ondansetron (ZOFRAN ODT) 4 MG disintegrating tablet Take 1 tablet by mouth every 8 hours as needed for Nausea or Vomiting 90 tablet 1    prochlorperazine (COMPAZINE) 10 MG tablet Take 1 tablet by mouth every 6 hours as needed (chemo-induced nausea) 90 tablet 2    predniSONE (DELTASONE) 10 MG tablet Take 10 mg by mouth daily (with breakfast)      Pseudoephedrine-guaiFENesin (MUCINEX D PO) Take by mouth in the morning and at bedtime      torsemide (DEMADEX) 10 MG tablet Take 10 mg by mouth daily      acetaminophen (TYLENOL) 325 MG tablet Take by mouth every 4 hours as needed      aspirin 81 MG chewable tablet Take 81 mg by mouth daily      atorvastatin (LIPITOR) 80 MG tablet Take 80 mg by mouth at bedtime TAKE 1 TABLET BY MOUTH DAILY      cetirizine (ZYRTEC) 10 MG tablet Take 10 mg by mouth daily as needed      clopidogrel (PLAVIX) 75 MG tablet Take 75 mg by mouth daily      escitalopram (LEXAPRO) 10 MG tablet Take 10 mg by mouth daily      ezetimibe (ZETIA) 10 MG tablet Take 10 mg by mouth at bedtime      metoprolol succinate (TOPROL XL) 100 MG extended release tablet Take 100 mg by mouth daily      nitroGLYCERIN (NITROSTAT) 0.4 MG SL tablet Place 0.4 mg under the tongue 3 times daily as needed      pantoprazole (PROTONIX) 40 MG tablet Take 40 mg by mouth 2 times daily      Roflumilast (DALIRESP) 500 MCG tablet Take 500 mcg by mouth nightly       No current facility-administered medications for this visit.      Facility-Administered Medications Ordered in Other Visits   Medication Dose Route Frequency Provider Last Rate Last Admin    sodium chloride flush 0.9 % injection 10 mL  10 mL IntraVENous PRN Tamiko Orta MD   10 mL at 11/17/22 1033    0.9 % sodium chloride infusion  5-250 mL/hr IntraVENous PRN Tamiko Orta  mL/hr at 11/17/22 1232 100 mL/hr at 11/17/22 1232    durvalumab (IMFINZI) 590 mg in sodium chloride 0.9 % 250 mL chemo IVPB  10 mg/kg (Treatment Plan Recorded) IntraVENous Once Tamiko Orta  mL/hr at 11/17/22 1246 590 mg at 11/17/22 1246    sodium chloride flush 0.9 % injection 5-40 mL  5-40 mL IntraVENous PRN Tamiko Orta MD   10 mL at 11/17/22 1231    0.9 % sodium chloride infusion   IntraVENous Continuous Tamiko Orta MD        diphenhydrAMINE (BENADRYL) injection 50 mg  50 mg IntraVENous Once PRN Tamiko Orta MD        famotidine (PEPCID) 20 mg in sodium chloride (PF) 0.9 % 10 mL injection  20 mg IntraVENous Once PRN Tamiko Orta MD        hydrocortisone sodium succinate PF (SOLU-CORTEF) injection 100 mg  100 mg IntraVENous Once PRN Tamiko Orta MD        acetaminophen (TYLENOL) tablet 650 mg  650 mg Oral Once PRN Tamiko Orta MD        meperidine (DEMEROL) injection 12.5 mg  12.5 mg IntraVENous PRN Tamiko Orta MD        ondansetron [facility]) injection 8 mg  8 mg IntraVENous Once PRN Tamiko Orta MD        EPINEPHrine PF 1 MG/ML injection (Anaphylaxis) 0.3 mg 0.3 mg IntraMUSCular PRN Kristan Simental MD        diatrizoate meglumine-sodium (GASTROGRAFIN) 66-10 % solution 15 mL  15 mL Oral ONCE PRN Kristan Simental MD   15 mL at 11/16/22 1117       OBJECTIVE:  BP (!) 105/57 (Site: Right Upper Arm, Position: Sitting, Cuff Size: Medium Adult)   Pulse 79   Temp 97.5 °F (36.4 °C) (Oral)   Resp 21   Ht 5' 5.75\" (1.67 m)   Wt 130 lb 6.4 oz (59.1 kg)   SpO2 94%   BMI 21.21 kg/m²     Physical Exam:  Constitutional: Oriented to person, place, and time. Well-developed and well-nourished. HEENT: Normocephalic and atraumatic. Oropharynx is clear and moist.   Conjunctivae and EOM are normal. Pupils are equal, round, and reactive to light. No scleral icterus. Neck supple. No JVD present. No tracheal deviation present. No thyromegaly present. Lymph node No palpable submandibular, cervical, supraclavicular, axillary and inguinal lymph nodes. Skin Warm and dry. No bruising and no rash noted. No erythema. No pallor. Respiratory Effort normal and breath sounds normal.  No respiratory distress. No wheezes. No rales. No tenderness. CVS Normal rate, regular rhythm and normal heart sounds. Exam reveals no gallop, no friction and no rub. No murmur heard. Abdomen Soft. Bowel sounds are normal. Exhibits no distension. There is no tenderness. There is no rebound and no guarding. Neuro Normal reflexes. No cranial nerve deficit. Exhibits normal muscle tone, 5 of 5 strength of all extremities. MSK Normal range of motion in general.  No edema and no tenderness.    Psych Normal mood, affect, behavior, judgment and thought content      Labs:  Recent Results (from the past 24 hour(s))   Magnesium    Collection Time: 11/17/22 10:22 AM   Result Value Ref Range    Magnesium 2.0 1.8 - 2.4 mg/dL   Comprehensive Metabolic Panel    Collection Time: 11/17/22 10:22 AM   Result Value Ref Range    Sodium 137 133 - 143 mmol/L    Potassium 3.4 (L) 3.5 - 5.1 mmol/L    Chloride 101 101 - 110 mmol/L    CO2 31 21 - 32 mmol/L    Anion Gap 5 2 - 11 mmol/L    Glucose 110 (H) 65 - 100 mg/dL    BUN 19 8 - 23 MG/DL    Creatinine 1.00 0.6 - 1.0 MG/DL    Est, Glom Filt Rate 59 (L) >60 ml/min/1.73m2    Calcium 9.1 8.3 - 10.4 MG/DL    Total Bilirubin 0.3 0.2 - 1.1 MG/DL    ALT 20 12 - 65 U/L    AST 17 15 - 37 U/L    Alk Phosphatase 53 50 - 136 U/L    Total Protein 7.0 6.3 - 8.2 g/dL    Albumin 3.4 3.2 - 4.6 g/dL    Globulin 3.6 2.8 - 4.5 g/dL    Albumin/Globulin Ratio 0.9 0.4 - 1.6     CBC with Auto Differential    Collection Time: 11/17/22 10:22 AM   Result Value Ref Range    WBC 7.2 4.3 - 11.1 K/uL    RBC 3.22 (L) 4.05 - 5.2 M/uL    Hemoglobin 10.1 (L) 11.7 - 15.4 g/dL    Hematocrit 31.5 (L) 35.8 - 46.3 %    MCV 97.8 82.0 - 102.0 FL    MCH 31.4 26.1 - 32.9 PG    MCHC 32.1 31.4 - 35.0 g/dL    RDW 18.5 (H) 11.9 - 14.6 %    Platelets 910 956 - 470 K/uL    MPV 8.9 (L) 9.4 - 12.3 FL    nRBC 0.00 0.0 - 0.2 K/uL    Seg Neutrophils 74 43 - 78 %    Lymphocytes 8 (L) 13 - 44 %    Monocytes 15 (H) 4.0 - 12.0 %    Eosinophils % 1 0.5 - 7.8 %    Basophils 1 0.0 - 2.0 %    Immature Granulocytes 1 0.0 - 5.0 %    Segs Absolute 5.3 1.7 - 8.2 K/UL    Absolute Lymph # 0.6 0.5 - 4.6 K/UL    Absolute Mono # 1.1 0.1 - 1.3 K/UL    Absolute Eos # 0.1 0.0 - 0.8 K/UL    Basophils Absolute 0.1 0.0 - 0.2 K/UL    Absolute Immature Granulocyte 0.0 0.0 - 0.5 K/UL    Differential Type AUTOMATED     TSH without Reflex    Collection Time: 11/17/22 10:22 AM   Result Value Ref Range    TSH, 3RD GENERATION 7.210 (H) 0.358 - 3 uIU/mL       Imaging:  No results found for this or any previous visit. ASSESSMENT/PLAN:   Diagnosis Orders   1.  Non-small cell lung cancer, unspecified laterality (HCC)  CBC With Auto Differential    Comprehensive metabolic panel    TSH without Reflex    HEPATITIS B SURFACE ANTIGEN    Hepatitis B core antibody, total    Hepatitis B surface antibody    DISCONTINUED: 0.9 % sodium chloride infusion    DISCONTINUED: durvalumab (IMFINZI) 590 mg in sodium chloride 0.9 % 250 mL chemo IVPB    DISCONTINUED: sodium chloride flush 0.9 % injection 5-40 mL    DISCONTINUED: 0.9 % sodium chloride infusion    DISCONTINUED: diphenhydrAMINE (BENADRYL) injection 50 mg    DISCONTINUED: famotidine (PEPCID) injection 20 mg    DISCONTINUED: hydrocortisone sodium succinate PF (SOLU-CORTEF) injection 100 mg    DISCONTINUED: acetaminophen (TYLENOL) tablet 650 mg    DISCONTINUED: meperidine (DEMEROL) injection 12.5 mg    DISCONTINUED: ondansetron (ZOFRAN) injection 8 mg    DISCONTINUED: EPINEPHrine PF 1 MG/ML injection (Anaphylaxis) 0.3 mg      2. High risk medication use        3. Mediastinal metastasis ([address]        4. Anemia, unspecified type                      67 y.o. F consulted for lung adenocarcinoma presented to [facility] on 7/26/2022 with . She was a longtime smoker quitted 5 years ago, COPD on home oxygen, recently found left lower lobe 4.4 cm mass and PET showed avid left hilar and subcarinal lymphadenopathy, station 7 lymphadenopathy not accessible on EBUS but biopsy of station 11 L lymph node showed lung adenocarcinoma.   We discussed that this was consistent with stage III lung cancer, discussed potential curative intent therapy with concurrent chemoradiation followed by immunotherapy, all questions answered and patient is interested, we will arrange port placement and radiation oncology evaluation, discussed toxicities and management, patient takes Plavix for severe CAD with multiple coronary stents, no absolute contraindication but would need careful management, return on 9/6/2022 and ready to proceed with cycle 1, monitor bruising and platelet carefully, requested to check thyroid function and reportedly being placed on methimazole for supposed hyperthyroidism when she was hospitalized months ago without any follow-ups, check TSH actually elevated and refer to endocrinology to clarify the management, followed on 10/12/2022, still having significant odynophagia but managing with Magic mouthwash and tramadol and maintain the weight, completed 6 weeks chemoradiation and repeat CT showed good [place], mostly recovering from chemoradiation toxicity, discussed adjuvant durvalumab and motivated to pursue, educated for AE/management, proceed to cycle 1 and okay to change to every 4 week dose with pharmacy, check anemia labs, return in a month to follow for cycle 2 but call as needed. Review/visit/discussion/management over 40 minutes. All questions are answered to their satisfaction. They will call for further questions and concerns. ECOG PERFORMANCE STATUS - 1- Restricted in physically strenuous activity but ambulatory and able to carry out work of a light or sedentary nature such as light house work, office work. Pain - 0 - No pain/10. None/Minimal pain - not affecting QOL     Fatigue - No flowsheet data found. Distress - No flowsheet data found. Elements of this note have been dictated via voice recognition software. Text and phrases may be limited by the accuracy and autoconversion of the software. The chart has been reviewed, but errors may still be present. Eric Dunbar M.D.   [address]  Office : [phone]  Fax : [phone]

## 2022-11-17 NOTE — PROGRESS NOTES
Patient arrived to port lab for port access and lab draw   [address] accessed and labs drawn per protocol   *Port remains accessed   Patient discharged from port lab via w/c*

## 2022-11-17 NOTE — PROGRESS NOTES
Arrived to the [facility]. Durvalumab completed. Patient tolerated well. Any issues or concerns during appointment: none. Patient aware of next infusion appointment on 12/15/22 (date) at 1330 (time). Patient aware of next lab and [facility] office visit on 12/15/22 (date) at 200 (time). Patient instructed to call provider with temperature of 100.4 or greater or nausea/vomiting/ diarrhea or pain not controlled by medications  Discharged via wheelchair.

## 2022-11-17 NOTE — PROGRESS NOTES
2022 - Pt seen by Dr. Efraín Kearney for pre-chemo visit. Reviewed CT results, showing good respoinse. Discussed immunotherapy with pt and possible side effects. Pt ready to start treatment, Durvalumab; drug has been approved by [facility] for today's treatment. Chemo education completed and consent signed and form scanned into pt's chart. Labs reviewed; OK to proceed to infusion. Encouraged to call with questions. Navigation will continue to follow. IV Chemotherapy/Biotherapy Education:  Liz Ahn  is a 67y.o. year old female with Non-small cell lung cancer who presents for chemotherapy education for the following medication(s): (none)  Schedule and frequency of chemotherapy were discussed with the patient. The patient was given handouts published by the [employer] titled \"Chemotherapy and You\" and \"Eating Hints Before, During, and After Cancer Treatment\" for reference. Medication specific information was printed from "[employer] and distributed to the patient.     Self-care guidelines were distributed and discussed with the patient and included the followin) Potential long term and short term side effects of therapy including fertility risks for appropriate patients    2) Symptoms and side effects that require the patient to contact [address] [place] [address] or require immediate attention    3) Symptoms or events that require immediate discontinuation of oral or self-administered treatments    4) Procedures for handling medications at home, including storage, safe handling, and management of unused medications    5) Procedures for handling bodily fluids and waste in the home    6) The [address] [place] [address]'s contact information with availability and instructions on who and when to call    7) The [address] [place] [address] missed appointment policy and expectations for rescheduling or canceling    Patient asked appropriate questions and was very involved and engaged during the educational session. There were no barriers to learning that were observed or demonstrated during the encounter. Preference in learning style assessed as visual, written and verbal.  Patient acknowledged readiness to learn by responding appropriately to open ended questions about chemo education, disease process, treatment plan and possible side effects, etc.  Patient offered feedback on learning and the educational encounter. Carlos Monroe acknowledges the importance of and agrees to adhering to the treatment plan regimen. Upcoming appointments for treatment and follow-up visits with labs were reviewed with the patient. Time was provided for the patient to ask questions. Elva Hyde verbalized understanding of the treatment plan. Vitals: There were no vitals filed for this visit. Informed Consent for Administration of Chemotherapy or Biotherapy for Liyah Arteaga was signed and scanned into Epic under the Media tab. Total time spent for chemo education/counselin minutes, 100% in direct counseling. Shakila Au RN  Solid Tumor Nurse Navigator  [phone] cell phone   For Urgent Matters (after hours and weekends), please call Triage at [phone]. For Emergencies, please call 911.

## 2022-11-18 LAB — HBV CORE AB SERPL QL IA: NEGATIVE

## 2022-11-21 ENCOUNTER — CLINICAL DOCUMENTATION (OUTPATIENT)
Dept: ONCOLOGY | Age: 72
End: 2022-11-21

## 2022-11-21 ENCOUNTER — HOSPITAL ENCOUNTER (OUTPATIENT)
Dept: INFUSION THERAPY | Age: 72
Discharge: HOME | End: 2022-11-21
Payer: MEDICARE

## 2022-11-21 ENCOUNTER — HOSPITAL ENCOUNTER (EMERGENCY)
Age: 72
Discharge: HOME | End: 2022-11-21
Attending: EMERGENCY MEDICINE
Payer: MEDICARE

## 2022-11-21 ENCOUNTER — OFFICE VISIT (OUTPATIENT)
Dept: ONCOLOGY | Age: 72
End: 2022-11-21
Payer: MEDICARE

## 2022-11-21 VITALS
OXYGEN SATURATION: 94 % | HEART RATE: 91 BPM | RESPIRATION RATE: 22 BRPM | TEMPERATURE: 98.2 F | WEIGHT: 130 LBS | BODY MASS INDEX: 21.14 KG/M2 | DIASTOLIC BLOOD PRESSURE: 73 MMHG | SYSTOLIC BLOOD PRESSURE: 164 MMHG

## 2022-11-21 VITALS
HEART RATE: 93 BPM | RESPIRATION RATE: 18 BRPM | DIASTOLIC BLOOD PRESSURE: 86 MMHG | TEMPERATURE: 97.9 F | OXYGEN SATURATION: 100 % | SYSTOLIC BLOOD PRESSURE: 147 MMHG

## 2022-11-21 DIAGNOSIS — R52 PAIN: ICD-10-CM

## 2022-11-21 DIAGNOSIS — C34.90 NON-SMALL CELL LUNG CANCER, UNSPECIFIED LATERALITY (HCC): Primary | ICD-10-CM

## 2022-11-21 DIAGNOSIS — L23.2 ALLERGIC CONTACT DERMATITIS DUE TO COSMETICS: ICD-10-CM

## 2022-11-21 DIAGNOSIS — R07.9 CHEST PAIN, UNSPECIFIED TYPE: ICD-10-CM

## 2022-11-21 DIAGNOSIS — Z79.899 HIGH RISK MEDICATION USE: ICD-10-CM

## 2022-11-21 DIAGNOSIS — B02.9 HERPES ZOSTER WITHOUT COMPLICATION: Primary | ICD-10-CM

## 2022-11-21 DIAGNOSIS — B02.8 HERPES ZOSTER WITH OTHER COMPLICATION: Primary | ICD-10-CM

## 2022-11-21 DIAGNOSIS — C34.90 NON-SMALL CELL LUNG CANCER, UNSPECIFIED LATERALITY (HCC): ICD-10-CM

## 2022-11-21 LAB
ALBUMIN SERPL-MCNC: 3.2 G/DL (ref 3.2–4.6)
ALBUMIN/GLOB SERPL: 0.8 {RATIO} (ref 0.4–1.6)
ALP SERPL-CCNC: 55 U/L (ref 50–136)
ALT SERPL-CCNC: 28 U/L (ref 12–65)
ANION GAP SERPL CALC-SCNC: 5 MMOL/L (ref 2–11)
AST SERPL-CCNC: 24 U/L (ref 15–37)
BASOPHILS # BLD: 0 K/UL (ref 0–0.2)
BASOPHILS NFR BLD: 1 % (ref 0–2)
BILIRUB SERPL-MCNC: 0.3 MG/DL (ref 0.2–1.1)
BUN SERPL-MCNC: 8 MG/DL (ref 8–23)
CALCIUM SERPL-MCNC: 8.9 MG/DL (ref 8.3–10.4)
CHLORIDE SERPL-SCNC: 103 MMOL/L (ref 101–110)
CO2 SERPL-SCNC: 28 MMOL/L (ref 21–32)
CREAT SERPL-MCNC: 0.7 MG/DL (ref 0.6–1)
DIFFERENTIAL METHOD BLD: ABNORMAL
EOSINOPHIL # BLD: 0.1 K/UL (ref 0–0.8)
EOSINOPHIL NFR BLD: 3 % (ref 0.5–7.8)
ERYTHROCYTE [DISTWIDTH] IN BLOOD BY AUTOMATED COUNT: 18.3 % (ref 11.9–14.6)
GLOBULIN SER CALC-MCNC: 3.8 G/DL (ref 2.8–4.5)
GLUCOSE SERPL-MCNC: 105 MG/DL (ref 65–100)
HCT VFR BLD AUTO: 30.8 % (ref 35.8–46.3)
HGB BLD-MCNC: 9.8 G/DL (ref 11.7–15.4)
IMM GRANULOCYTES # BLD AUTO: 0 K/UL (ref 0–0.5)
IMM GRANULOCYTES NFR BLD AUTO: 1 % (ref 0–5)
LYMPHOCYTES # BLD: 0.3 K/UL (ref 0.5–4.6)
LYMPHOCYTES NFR BLD: 6 % (ref 13–44)
MAGNESIUM SERPL-MCNC: 2.1 MG/DL (ref 1.8–2.4)
MCH RBC QN AUTO: 31.4 PG (ref 26.1–32.9)
MCHC RBC AUTO-ENTMCNC: 31.8 G/DL (ref 31.4–35)
MCV RBC AUTO: 98.7 FL (ref 82–102)
MONOCYTES # BLD: 0.8 K/UL (ref 0.1–1.3)
MONOCYTES NFR BLD: 13 % (ref 4–12)
NEUTS SEG # BLD: 4.4 K/UL (ref 1.7–8.2)
NEUTS SEG NFR BLD: 76 % (ref 43–78)
NRBC # BLD: 0 K/UL (ref 0–0.2)
PLATELET # BLD AUTO: 226 K/UL (ref 150–450)
PMV BLD AUTO: 8.6 FL (ref 9.4–12.3)
POTASSIUM SERPL-SCNC: 3.8 MMOL/L (ref 3.5–5.1)
PROT SERPL-MCNC: 7 G/DL (ref 6.3–8.2)
RBC # BLD AUTO: 3.12 M/UL (ref 4.05–5.2)
SODIUM SERPL-SCNC: 136 MMOL/L (ref 133–143)
TSH, 3RD GENERATION: 12.2 UIU/ML (ref 0.36–3)
WBC # BLD AUTO: 5.7 K/UL (ref 4.3–11.1)

## 2022-11-21 PROCEDURE — 3017F COLORECTAL CA SCREEN DOC REV: CPT | Performed by: INTERNAL MEDICINE

## 2022-11-21 PROCEDURE — 1090F PRES/ABSN URINE INCON ASSESS: CPT | Performed by: INTERNAL MEDICINE

## 2022-11-21 PROCEDURE — 80053 COMPREHEN METABOLIC PANEL: CPT

## 2022-11-21 PROCEDURE — 1123F ACP DISCUSS/DSCN MKR DOCD: CPT | Performed by: INTERNAL MEDICINE

## 2022-11-21 PROCEDURE — G8427 DOCREV CUR MEDS BY ELIG CLIN: HCPCS | Performed by: INTERNAL MEDICINE

## 2022-11-21 PROCEDURE — 6360000002 HC RX W HCPCS: Performed by: EMERGENCY MEDICINE

## 2022-11-21 PROCEDURE — 99284 EMERGENCY DEPT VISIT MOD MDM: CPT

## 2022-11-21 PROCEDURE — 84443 ASSAY THYROID STIM HORMONE: CPT

## 2022-11-21 PROCEDURE — G8400 PT W/DXA NO RESULTS DOC: HCPCS | Performed by: INTERNAL MEDICINE

## 2022-11-21 PROCEDURE — 2580000003 HC RX 258: Performed by: INTERNAL MEDICINE

## 2022-11-21 PROCEDURE — 3078F DIAST BP <80 MM HG: CPT | Performed by: INTERNAL MEDICINE

## 2022-11-21 PROCEDURE — 85025 COMPLETE CBC W/AUTO DIFF WBC: CPT

## 2022-11-21 PROCEDURE — 1036F TOBACCO NON-USER: CPT | Performed by: INTERNAL MEDICINE

## 2022-11-21 PROCEDURE — 83735 ASSAY OF MAGNESIUM: CPT

## 2022-11-21 PROCEDURE — G8484 FLU IMMUNIZE NO ADMIN: HCPCS | Performed by: INTERNAL MEDICINE

## 2022-11-21 PROCEDURE — 3074F SYST BP LT 130 MM HG: CPT | Performed by: INTERNAL MEDICINE

## 2022-11-21 PROCEDURE — 96372 THER/PROPH/DIAG INJ SC/IM: CPT

## 2022-11-21 PROCEDURE — G8420 CALC BMI NORM PARAMETERS: HCPCS | Performed by: INTERNAL MEDICINE

## 2022-11-21 PROCEDURE — 36591 DRAW BLOOD OFF VENOUS DEVICE: CPT

## 2022-11-21 PROCEDURE — 99214 OFFICE O/P EST MOD 30 MIN: CPT | Performed by: INTERNAL MEDICINE

## 2022-11-21 PROCEDURE — 6370000000 HC RX 637 (ALT 250 FOR IP): Performed by: EMERGENCY MEDICINE

## 2022-11-21 RX ORDER — SODIUM CHLORIDE 0.9 % (FLUSH) 0.9 %
10 SYRINGE (ML) INJECTION PRN
Status: DISCONTINUED | OUTPATIENT
Start: 2022-11-21 | End: 2022-11-22 | Stop reason: HOSPADM

## 2022-11-21 RX ORDER — VALACYCLOVIR HYDROCHLORIDE 500 MG/1
500 TABLET, FILM COATED ORAL 3 TIMES DAILY
Qty: 21 TABLET | Refills: 0 | Status: SHIPPED | OUTPATIENT
Start: 2022-11-21 | End: 2022-11-21 | Stop reason: DRUGHIGH

## 2022-11-21 RX ORDER — VALACYCLOVIR HYDROCHLORIDE 1 G/1
1000 TABLET, FILM COATED ORAL 3 TIMES DAILY
Qty: 21 TABLET | Refills: 0 | Status: SHIPPED | OUTPATIENT
Start: 2022-11-21 | End: 2022-11-28

## 2022-11-21 RX ORDER — HYDROCODONE BITARTRATE AND ACETAMINOPHEN 5; 325 MG/1; MG/1
2 TABLET ORAL EVERY 6 HOURS PRN
Qty: 10 TABLET | Refills: 0 | Status: SHIPPED | OUTPATIENT
Start: 2022-11-21 | End: 2022-12-11

## 2022-11-21 RX ORDER — KETOROLAC TROMETHAMINE 10 MG/1
10 TABLET, FILM COATED ORAL EVERY 6 HOURS PRN
Qty: 20 TABLET | Refills: 0 | Status: SHIPPED | OUTPATIENT
Start: 2022-11-21

## 2022-11-21 RX ORDER — NALOXONE HYDROCHLORIDE 4 MG/.1ML
1 SPRAY NASAL PRN
Qty: 1 EACH | Refills: 0 | Status: SHIPPED | OUTPATIENT
Start: 2022-11-21 | End: 2022-11-22

## 2022-11-21 RX ORDER — HYDROCODONE BITARTRATE AND ACETAMINOPHEN 5; 325 MG/1; MG/1
2 TABLET ORAL ONCE
Status: COMPLETED | OUTPATIENT
Start: 2022-11-21 | End: 2022-11-21

## 2022-11-21 RX ORDER — KETOROLAC TROMETHAMINE 30 MG/ML
30 INJECTION, SOLUTION INTRAMUSCULAR; INTRAVENOUS ONCE
Status: COMPLETED | OUTPATIENT
Start: 2022-11-21 | End: 2022-11-21

## 2022-11-21 RX ADMIN — KETOROLAC TROMETHAMINE 30 MG: 30 INJECTION, SOLUTION INTRAMUSCULAR at 18:33

## 2022-11-21 RX ADMIN — SODIUM CHLORIDE, PRESERVATIVE FREE 10 ML: 5 INJECTION INTRAVENOUS at 11:22

## 2022-11-21 RX ADMIN — HYDROCODONE BITARTRATE AND ACETAMINOPHEN 2 TABLET: 5; 325 TABLET ORAL at 18:34

## 2022-11-21 ASSESSMENT — PATIENT HEALTH QUESTIONNAIRE - PHQ9
2. FEELING DOWN, DEPRESSED OR HOPELESS: 0
SUM OF ALL RESPONSES TO PHQ QUESTIONS 1-9: 0
1. LITTLE INTEREST OR PLEASURE IN DOING THINGS: 0
SUM OF ALL RESPONSES TO PHQ QUESTIONS 1-9: 0
SUM OF ALL RESPONSES TO PHQ9 QUESTIONS 1 & 2: 0

## 2022-11-21 NOTE — ED NOTES
Pt called for triage. Pt needing assistance to restroom. Pt helped to restroom by this RN. Pt instructed to use call light when finished.      Justen Martino RN  11/21/22 0507

## 2022-11-21 NOTE — ED NOTES
I have reviewed discharge instructions with the patient. The patient verbalized understanding. Patient left ED via Discharge Method: wheelchair to Home with daughter. Opportunity for questions and clarification provided. Patient given 3 scripts. To continue your aftercare when you leave the hospital, you may receive an automated call from our care team to check in on how you are doing. This is a free service and part of our promise to provide the best care and service to meet your aftercare needs.  If you have questions, or wish to unsubscribe from this service please call [phone]. Thank you for Choosing our [facility] Emergency Department.         Liv Falcon RN  11/21/22 5206

## 2022-11-21 NOTE — DISCHARGE INSTRUCTIONS
Please return immediately if you change your mind and wish to have a work-up concerning your chest pain.

## 2022-11-21 NOTE — PROGRESS NOTES
[facility] Hematology & Oncology: Office Visit Progress Note    Chief Complaint:    Non-small cell lung cancer stage III    History of Present Illness:  Reason for Referral:  Non-small cell lung cancer     Referring Provider:  Dr. Jessica Mason, [facility] Pulmonary and Critical Care     Primary Care Provider:  Dr. Elvi Sutton, [facility])     Family History of Cancer/Hematologic Disorders:  No documented family history     Presenting Symptoms:   Presented to [facility] ED with COPD exacerbation     Ms. iLsa oGmez is a 67 y.o.  female with a medical history of COPD, asthma, oxygen dependent, CAD, closed right hip and femur fracture (2/22), subdural hematoma (3/22), MI, and thyroid disease. Surgical history includes bilateral cataract removal, right hip surgery, and right carotid endarterectomy. She is a former smoker of 50 years but quit in 2017. Denies alcohol use. On 6/9/22 patient presented to the [facility] ED with increased shortness of breath over several days. Admitted with exacerbation of COPD. CXR showed unchanged consolidation of left lower lobe lung. On 6/11 CT Chest PE protocol was performed which revealed no PE but a increase in the left lower lobe lung mass. She was discharged home on 6/12 with instruction to follow up with pulmonary within 1 week. On 6/24/22 she had a PET scan which showed known lung mass and left hilar and subcarinal mediastinal activity as well. On 6/28/22 she was seen in follow up with pulmonary. PET imaging reviewed. Recommended EBUS. Procedure was performed on 7/20/22. Pathology showed malignant cells 11L lymph node. Referral to medical and radiation oncology for evaluation and treatment recommendations.       MRI Brain scheduled on 8/2 and radiation oncology consult on 7/29.       6/9/22 [facility] ED CXR  FINDINGS: The lungs are hyperexpanded, consistent with emphysematous changes on the prior CT chest. Areas of nodular consolidation in the left lower lobe are unchanged. No acute infiltrate or pulmonary edema is seen. The heart size, mediastinal contour and pulmonary vasculature are normal. There are coronary artery calcifications or stents. The thorax or pleural effusion is seen. IMPRESSION:  1. Emphysema. 2. Coronary artery disease. 3. Unchanged nodular consolidation in the left lower lobe. 6/11/22 [facility] CT Chest PE protocol  Findings: There is no pleural or pericardial effusion. The heart and great vessels are unremarkable. There are no filling defects within the pulmonary arteries to suggest pulmonary emboli. Moderate emphysematous changes are present. Patchy interstitial opacity is present within the right upper lobe  anteriorly. Similar but less impressive findings are newly visualized within the left upper lobe anteriorly. Again noted is the ill-defined masslike opacity within the left lower lobe measuring approximately 2.0 x 3.6 cm previously measured at approximately 1.7 x 2.8 cm. Also is a ground glass opacity within the left upper lobe measuring 1.5 cm, unchanged when remeasured on the previous study. A left hilar lymph node  measures 1.4 cm in short axis, unchanged. There is a small left Bochdalek hernia containing fat. Imaging of the upper abdomen is unremarkable. There are compression fractures within the thoracic and lumbar spine with mild height loss. The fractures at T10 and T11 has occurred in the interim. IMPRESSION:  1. No evidence of pulmonary embolism. 2. Interval increase in left lower lobe mass suspicious for bronchogenic carcinoma. A left hilar lymph node also is enlarged concerning for metastatic adenopathy. 3. Newly visualized interstitial opacities within the upper lobes, right greater than left, most likely infectious. 4. Stable left lower lobe groundglass opacity. 5.  Interval compression fractures with mild height loss at T10 and T11.     6/24/22 [facility] PET  FINDINGS:  NECK/CHEST:  No suspicious activity is seen in the neck. Only minimal nonfocal activity is seen about left posterior facets in the mid cervical spine likely related to benign degenerative changes. No clear aggressive osseous lesion is suggested on limited CT images. The most clear suspicious activity is seen associated with a large heterogeneous lesion in the left lower lobe demonstrating spiculated appearing components best appreciated on axial image 92 measuring 4.4 cm x 2.3 cm in greatest transverse dimensions and with a maximum SUV of 11.2 g/mL. Malignancy is not excluded given the appearance. Additional focal appearing activity is seen in the left hilum on  pet/CT image 85 with a maximum SUV of 9.1 g/mL and in the subcarinal mediastinum on axial image 84 with a maximum SUV of 6.3 g/mL concerning for left hilar and subcarinal mediastinal diego metastases, respectively. Additional mild activity is seen in the mediastinum and left hilum on pet/CT image 76 which does appear to correspond to small prevascular and left hilar lymph nodes seen on axial CT  image 39 of the prior the chest dated 6/11/2022 which are suspicious for additional early diego metastases. No clearly suspicious activity is otherwise seen in the chest. Mild to moderate activity is seen in the medial right upper lobe on pet/CT image 56 with a maximum SUV of 4.8 g/mL. Although the degree of activity is suspicious, this appears to correspond to a fluid containing cystic structure in the right upper lobe with a CT appearance being more suggestive of an inflammatory process. Additional soft tissue activity is seen over the lateral scapula which is most consistent with benign muscle activity. ABDOMEN/PELVIS:  The only potentially suspicious activity is skin activity involving the left  proximal medial thigh seen on pet/CT image 257 with a maximum SUV of 6.3 g/mL which appears to be associated with focal skin thickening.  This is not felt to be related to the patient's lung cancer. However, a dermal neoplasm or focal  inflammatory process is not excluded given the appearance. This should be further assessed with direct visualization. No suspicious activity is otherwise seen below the diaphragms. Activity is seen adjacent to the right femoral neck. However, this appears to correspond to heterotopic bone formation from hip  surgery at this level without aggressive features suggested therefore is not worrisome. No focal hepatic activity is seen. No contour deforming, or hypermetabolic adrenal lesion is seen. Advanced atherosclerotic calcification is seen of the abdominal aorta. IMPRESSION:  1. Heterogeneous left lower lobe mass demonstrating irregular spiculated components and measuring 4.4 cm x 2.3 cm in greatest transverse dimensions and with a maximum SUV of 11.2 g/mL. Malignancy is not excluded given the appearance. Further evaluation as clinically indicated is recommended. 2. Additional left hilar and subcarinal mediastinal activity concerning for diego metastases. Additional mild activity is seen within a prevascular mediastinal lymph node an additional left hilar lymph nodes for which additional early diego metastases are not excluded. 3. Focal activity associated with focal skin thickening in the most medial, proximal left thigh seen on pet/CT image 257. This is unlikely to be related to potential lung cancer although could represent an additional dermal neoplasm or focal abnormal inflammatory process otherwise. This should be clinically  evaluated with direct visualization. This has been marked on image to assist with clinical correlation. 4. Mild activity associated with a cystic abnormality in the medial right upper lobe. Although the degree of activity is suspicious (maximum SUV of 4.8 g/mL) the CT features are more consistent with a potential inflammatory process. Attention on follow-up studies is recommended. 7/20/22 [facility] Pathology EBUS  DIAGNOSIS  11L Lymph Node, Fine Needle Aspiration:      MALIGNANT CELLS PRESENT. Cell block: Malignant cells present. Immunohistochemical findings will follow in an addendum. Notes from Referring Provider:  None     Other Pertinent Information:  Covid vaccination - 1/28/21, 2/18/21, and 9/25/21     Radiation oncology consult with Dr. Concha Fulton scheduled for 7/29/22 at 1 pm.     MRI Brain is scheduled for 8/2/22 at 8 am.       Interim history update in A/P. Review of Systems:  Constitutional Fatigue. Denies fever or chills. Denies weight loss or appetite changes. Denies anorexia. HEENT Denies trauma, bluring vision, hearing loss, ear pain, nosebleeds, sore throat, neck pain and ear discharge. Skin Rash. Denies lesions. Lungs Denies shortness of breath, cough, sputum production or hemoptysis. Cardiovascular Denies chest pain, palpitations, orthopnea, claudication and leg swelling. Gastrointestinal Odynophagia. Denies nausea, vomiting, bowel changes. Denies bloody or black stools. Denies abdominal pain.  Denies dysuria, frequency or hesitancy of urination   Neuro Denies headaches, visual changes or ataxia. Denies dizziness, tingling, tremors, sensory change, speech change, focal weakness and headaches. Hematology Denies nasal/gum bleeding, denies easy bruise   Endo Denies heat/cold intolerance, denies diabetes. MSK Denies back pain, swollen legs, myalgias and falls. Psychiatric/Behavioral Denies depression and substance abuse. The patient is not nervous/anxious.        Allergies   Allergen Reactions    Promethazine Nausea And Vomiting and Other (See Comments)     Severe vomiting      Past Medical History:   Diagnosis Date    Asthma     Closed right hip fracture ([facility] [address] 02/2022    Coronary artery disease     Former cigarette smoker     Fracture of right femur ([facility] [address] 02/2022    Hyperthyroidism     Lung mass     Multiple closed anterior-posterior compression fractures of pelvis ([facility] [address] 2022    Non-small cell lung cancer (HCC)     Severe chronic obstructive pulmonary disease (HCC)     Subdural hematoma 2022    Thyroid nodule      Past Surgical History:   Procedure Laterality Date    BRONCHOSCOPY N/A 2022    BRONCHOSCOPY ENDOBRONCHIAL ULTRASOUND performed by Emily Lugo MD at [facility] ENDOSCOPY    CAROTID ENDARTERECTOMY Right     CATARACT REMOVAL Bilateral     CORONARY STENT PLACEMENT  2022    HIP FRACTURE SURGERY Right 2022    IR PORT PLACEMENT EQUAL OR GREATER THAN 5 YEARS  2022    IR PORT PLACEMENT EQUAL OR GREATER THAN 5 YEARS 2022 [facility]    WISDOM TOOTH EXTRACTION      as a teenager     Family History   Adopted: Yes   Problem Relation Age of Onset    No Known Problems Mother     No Known Problems Father     Thyroid Disease Daughter      Social History     Socioeconomic History    Marital status:      Spouse name: Not on file    Number of children: Not on file    Years of education: Not on file    Highest education level: Not on file   Occupational History    Not on file   Tobacco Use    Smoking status: Former     Packs/day: 0.50     Years: 50.00     Pack years: 25.00     Types: Cigarettes     Quit date:      Years since quittin.8    Smokeless tobacco: Never   Substance and Sexual Activity    Alcohol use: Not Currently    Drug use: Not on file    Sexual activity: Not on file   Other Topics Concern    Not on file   Social History Narrative    Not on file     Social Determinants of Health     Financial Resource Strain: Not on file   Food Insecurity: Not on file   Transportation Needs: Not on file   Physical Activity: Not on file   Stress: Not on file   Social Connections: Not on file   Intimate Partner Violence: Not on file   Housing Stability: Not on file     Current Outpatient Medications   Medication Sig Dispense Refill    valACYclovir (VALTREX) 1 g tablet Take 1 tablet by mouth 3 times daily for 7 days 21 tablet 0    methIMAzole (TAPAZOLE) 10 MG tablet Take 0.5 tablets by mouth daily 45 tablet 3    azithromycin (ZITHROMAX) 500 MG tablet Take by mouth      Magic Mouthwash (MIRACLE MOUTHWASH) Swish and swallow 5 mLs 4 times daily as needed for Irritation Equal parts viscous lidocaine, maalox, nystatin, benadryl 480 mL 1    traMADol (ULTRAM) 50 MG tablet Take 2 tablets by mouth 4 times daily for 90 days. Z51.5 Palliative Care 240 tablet 2    albuterol (PROVENTIL) (2.5 MG/3ML) 0.083% nebulizer solution TAKE 3 ML BY NEBULIZATION EVERY 6 HOURS AS NEEDED FOR SHORTNESS OF BREATH      tiotropium (SPIRIVA RESPIMAT) 2.5 MCG/ACT AERS inhaler Inhale 2 puffs into the lungs in the morning. 1 each 11    budesonide-formoterol (SYMBICORT) 160-4.5 MCG/ACT AERO Inhale 2 puffs into the lungs in the morning and 2 puffs before bedtime. 10.2 g 11    OXYGEN Inhale into the lungs 2 LPM      lidocaine-prilocaine (EMLA) 2.5-2.5 % cream Apply topically as needed.  30 g 1    ondansetron (ZOFRAN ODT) 4 MG disintegrating tablet Take 1 tablet by mouth every 8 hours as needed for Nausea or Vomiting 90 tablet 1    prochlorperazine (COMPAZINE) 10 MG tablet Take 1 tablet by mouth every 6 hours as needed (chemo-induced nausea) 90 tablet 2    predniSONE (DELTASONE) 10 MG tablet Take 10 mg by mouth daily (with breakfast)      Pseudoephedrine-guaiFENesin (MUCINEX D PO) Take by mouth in the morning and at bedtime      torsemide (DEMADEX) 10 MG tablet Take 10 mg by mouth daily      acetaminophen (TYLENOL) 325 MG tablet Take by mouth every 4 hours as needed      aspirin 81 MG chewable tablet Take 81 mg by mouth daily      atorvastatin (LIPITOR) 80 MG tablet Take 80 mg by mouth at bedtime TAKE 1 TABLET BY MOUTH DAILY      cetirizine (ZYRTEC) 10 MG tablet Take 10 mg by mouth daily as needed      clopidogrel (PLAVIX) 75 MG tablet Take 75 mg by mouth daily      escitalopram (LEXAPRO) 10 MG tablet Take 10 mg by mouth daily      ezetimibe (ZETIA) 10 MG tablet Take 10 mg by mouth at bedtime      metoprolol succinate (TOPROL XL) 100 MG extended release tablet Take 100 mg by mouth daily      nitroGLYCERIN (NITROSTAT) 0.4 MG SL tablet Place 0.4 mg under the tongue 3 times daily as needed      pantoprazole (PROTONIX) 40 MG tablet Take 40 mg by mouth 2 times daily      Roflumilast (DALIRESP) 500 MCG tablet Take 500 mcg by mouth nightly      predniSONE (DELTASONE) 5 MG tablet  (Patient not taking: Reported on 11/21/2022)       No current facility-administered medications for this visit. Facility-Administered Medications Ordered in Other Visits   Medication Dose Route Frequency Provider Last Rate Last Admin    sodium chloride flush 0.9 % injection 10 mL  10 mL IntraVENous PRN Meghann Diaz MD   10 mL at 11/21/22 1122       OBJECTIVE:  BP (!) 164/73 Comment: sitting  Pulse 91   Temp 98.2 °F (36.8 °C) (Oral)   Resp 22   Wt 130 lb (59 kg)   SpO2 94%   BMI 21.14 kg/m²     Physical Exam:  Constitutional: Oriented to person, place, and time. Well-developed and well-nourished. HEENT: Normocephalic and atraumatic. Oropharynx is clear and moist.   Conjunctivae and EOM are normal. Pupils are equal, round, and reactive to light. No scleral icterus. Neck supple. No JVD present. No tracheal deviation present. No thyromegaly present. Lymph node No palpable submandibular, cervical, supraclavicular, axillary and inguinal lymph nodes. Skin Rash. Warm and dry. No bruising noted. No erythema. No pallor. Respiratory Effort normal and breath sounds normal.  No respiratory distress. No wheezes. No rales. No tenderness. CVS Normal rate, regular rhythm and normal heart sounds. Exam reveals no gallop, no friction and no rub. No murmur heard. Abdomen Soft. Bowel sounds are normal. Exhibits no distension. There is no tenderness. There is no rebound and no guarding. Neuro Normal reflexes. No cranial nerve deficit.   Exhibits normal muscle tone, 5 of 5 strength of all extremities. MSK Normal range of motion in general.  No edema and no tenderness.    Psych Normal mood, affect, behavior, judgment and thought content      Labs:  Recent Results (from the past 24 hour(s))   Magnesium    Collection Time: 11/21/22 11:15 AM   Result Value Ref Range    Magnesium 2.1 1.8 - 2.4 mg/dL   Comprehensive Metabolic Panel    Collection Time: 11/21/22 11:15 AM   Result Value Ref Range    Sodium 136 133 - 143 mmol/L    Potassium 3.8 3.5 - 5.1 mmol/L    Chloride 103 101 - 110 mmol/L    CO2 28 21 - 32 mmol/L    Anion Gap 5 2 - 11 mmol/L    Glucose 105 (H) 65 - 100 mg/dL    BUN 8 8 - 23 MG/DL    Creatinine 0.70 0.6 - 1.0 MG/DL    Est, Glom Filt Rate >60 >60 ml/min/1.73m2    Calcium 8.9 8.3 - 10.4 MG/DL    Total Bilirubin 0.3 0.2 - 1.1 MG/DL    ALT 28 12 - 65 U/L    AST 24 15 - 37 U/L    Alk Phosphatase 55 50 - 136 U/L    Total Protein 7.0 6.3 - 8.2 g/dL    Albumin 3.2 3.2 - 4.6 g/dL    Globulin 3.8 2.8 - 4.5 g/dL    Albumin/Globulin Ratio 0.8 0.4 - 1.6     CBC with Auto Differential    Collection Time: 11/21/22 11:15 AM   Result Value Ref Range    WBC 5.7 4.3 - 11.1 K/uL    RBC 3.12 (L) 4.05 - 5.2 M/uL    Hemoglobin 9.8 (L) 11.7 - 15.4 g/dL    Hematocrit 30.8 (L) 35.8 - 46.3 %    MCV 98.7 82.0 - 102.0 FL    MCH 31.4 26.1 - 32.9 PG    MCHC 31.8 31.4 - 35.0 g/dL    RDW 18.3 (H) 11.9 - 14.6 %    Platelets 437 465 - 586 K/uL    MPV 8.6 (L) 9.4 - 12.3 FL    nRBC 0.00 0.0 - 0.2 K/uL    Seg Neutrophils 76 43 - 78 %    Lymphocytes 6 (L) 13 - 44 %    Monocytes 13 (H) 4.0 - 12.0 %    Eosinophils % 3 0.5 - 7.8 %    Basophils 1 0.0 - 2.0 %    Immature Granulocytes 1 0.0 - 5.0 %    Segs Absolute 4.4 1.7 - 8.2 K/UL    Absolute Lymph # 0.3 (L) 0.5 - 4.6 K/UL    Absolute Mono # 0.8 0.1 - 1.3 K/UL    Absolute Eos # 0.1 0.0 - 0.8 K/UL    Basophils Absolute 0.0 0.0 - 0.2 K/UL    Absolute Immature Granulocyte 0.0 0.0 - 0.5 K/UL    Differential Type AUTOMATED     TSH    Collection Time: 11/21/22 11:15 AM   Result Value Ref Range    TSH, 3RD GENERATION 12.200 (H) 0.358 - 3 uIU/mL       Imaging:  No results found for this or any previous visit. ASSESSMENT/PLAN:   Diagnosis Orders   1. Herpes zoster with other complication  valACYclovir (VALTREX) 1 g tablet    DISCONTINUED: valACYclovir (VALTREX) 500 MG tablet      2. Pain        3. Allergic contact dermatitis due to cosmetics              67 y.o. F consulted for lung adenocarcinoma presented to [facility] 7[facility]26/2022 with . She was a longtime smoker quitted 5 years ago, COPD on home oxygen, recently found left lower lobe 4.4 cm mass and PET showed avid left hilar and subcarinal lymphadenopathy, station 7 lymphadenopathy not accessible on EBUS but biopsy of station 11 L lymph node showed lung adenocarcinoma.   We discussed that this was consistent with stage III lung cancer, discussed potential curative intent therapy with concurrent chemoradiation followed by immunotherapy, all questions answered and patient is interested, we will arrange port placement and radiation oncology evaluation, discussed toxicities and management, patient takes Plavix for severe CAD with multiple coronary stents, no absolute contraindication but would need careful management, return on 9/6/2022 and ready to proceed with cycle 1, monitor bruising and platelet carefully, requested to check thyroid function and reportedly being placed on methimazole for supposed hyperthyroidism when she was hospitalized months ago without any follow-ups, check TSH actually elevated and refer to endocrinology to clarify the management, followed on 10/12/2022, still having significant odynophagia but managing with Magic mouthwash and tramadol and maintain the weight, completed 6 weeks chemoradiation and repeat CT showed good ME, mostly recovering from chemoradiation toxicity, discussed adjuvant durvalumab and motivated to pursue, educated for AE/management, proceed to cycle 1 and okay to change to every 4 week dose with pharmacy, check anemia labs, return in a month to follow for cycle 2 but call as needed. Review/visit/discussion/management over 40 minutes. She called to be working for pain and rash, found shingles of left chest and prescribed valacyclovir therapeutic for a week, refill pain medicine, also having skin rash in the scalp after she dyed her hair pink and I suspect this is contact dermatitis, prescribed cortisone cream and Benadryl gel to use topically, she has been taking prednisone 10 mg daily from pulmonology chronically and she feels need to continue, return as scheduled but call if needed. All questions are answered to their satisfaction. They will call for further questions and concerns. ECOG PERFORMANCE STATUS - 1- Restricted in physically strenuous activity but ambulatory and able to carry out work of a light or sedentary nature such as light house work, office work. Pain - 9/10. None/Minimal pain - not affecting QOL     Fatigue - No flowsheet data found. Distress - No flowsheet data found. Elements of this note have been dictated via voice recognition software. Text and phrases may be limited by the accuracy and autoconversion of the software. The chart has been reviewed, but errors may still be present. Stephen Oden M.D.   [address] [place], [address] [place] [address]  Office : [phone]  Fax : [phone]

## 2022-11-21 NOTE — PROGRESS NOTES
11/21/2022 - Pt called clinic, reporting pain to Left-middle side back area; pt has been taking Tramadol, but the pain has increased, so she is taking Hydrocodone Acet. 5-325mg and Tramadol, alternating medications and reports, \"pain is not being relieved. \" 8/10. This is a new pain, the pain area has reddened skin, 'bruisy-looking'. Also, c/o ithcing all over, especially on her head and back area, started Friday, 11/8. Also, c/o intermittent nausea, but no vomiting; taking Zofran \"which helps. \" Afebrile. Dr. Gordo Frank notified; we will bring pt in to have labs drawn and evaluate. Pt called and VU. Encouraged to call with questions. Navigation will continue to follow.

## 2022-11-21 NOTE — PATIENT INSTRUCTIONS
Patient Instructions from Today's Visit    Reason for Visit:  Sick visit    Diagnosis Information:  [email or web address]://[email or web address]/. net/about-us/asco-answers-patient-education-materials/rrxs-hteraqu-qfyo-sheets    Plan:  You have a rash on your head and back, itches. Recommend using Cortisone cream and Benadryl gel on your scalf for your rash. Your pain started on 11/18, started at your left lower back area and it is not getting any better with taking your pain medications: Tramadol/Hydrocodone, alternating both. We think it may be shingles and we will send in a Rx: for Valaciclovir, take 3 times/day x 7 days. Follow Up:   In 1 week    Recent Lab Results:  Hospital Outpatient Visit on 11/21/2022   Component Date Value Ref Range Status    WBC 11/21/2022 5.7  4.3 - 11.1 K/uL Final    RBC 11/21/2022 3.12 (A)  4.05 - 5.2 M/uL Final    Hemoglobin 11/21/2022 9.8 (A)  11.7 - 15.4 g/dL Final    Hematocrit 11/21/2022 30.8 (A)  35.8 - 46.3 % Final    MCV 11/21/2022 98.7  82.0 - 102.0 FL Final    MCH 11/21/2022 31.4  26.1 - 32.9 PG Final    MCHC 11/21/2022 31.8  31.4 - 35.0 g/dL Final    RDW 11/21/2022 18.3 (A)  11.9 - 14.6 % Final    Platelets 91/57/4023 226  150 - 450 K/uL Final    MPV 11/21/2022 8.6 (A)  9.4 - 12.3 FL Final    nRBC 11/21/2022 0.00  0.0 - 0.2 K/uL Final    **Note: Absolute NRBC parameter is now reported with Hemogram**    Seg Neutrophils 11/21/2022 76  43 - 78 % Final    Lymphocytes 11/21/2022 6 (A)  13 - 44 % Final    Monocytes 11/21/2022 13 (A)  4.0 - 12.0 % Final    Eosinophils % 11/21/2022 3  0.5 - 7.8 % Final    Basophils 11/21/2022 1  0.0 - 2.0 % Final    Immature Granulocytes 11/21/2022 1  0.0 - 5.0 % Final    Segs Absolute 11/21/2022 4.4  1.7 - 8.2 K/UL Final    Absolute Lymph # 11/21/2022 0.3 (A)  0.5 - 4.6 K/UL Final    Absolute Mono # 11/21/2022 0.8  0.1 - 1.3 K/UL Final    Absolute Eos # 11/21/2022 0.1  0.0 - 0.8 K/UL Final    Basophils Absolute 11/21/2022 0.0  0.0 - 0.2 K/UL Final    Absolute Immature Granulocyte 11/21/2022 0.0  0.0 - 0.5 K/UL Final    Differential Type 11/21/2022 AUTOMATED    Final   Hospital Outpatient Visit on 11/16/2022   Component Date Value Ref Range Status    POC Creatinine 11/16/2022 0.85  0.8 - 1.5 mg/dL Final    eGFR, POC 11/16/2022 >60  >60 ml/min/1.73m2 Final    Comment:      Pediatric calculator link: [employer].at. org/professionals/kdoqi/gfr_calculatorped       Effective Oct 3, 2022       These results are not intended for use in patients <25years of age. eGFR results are calculated without a race factor using  the 2021 CKD-EPI equation. Careful clinical correlation is recommended, particularly when comparing to results calculated using previous equations. The CKD-EPI equation is less accurate in patients with extremes of muscle mass, extra-renal metabolism of creatinine, excessive creatine ingestion, or following therapy that affects renal tubular secretion. Treatment Summary has been discussed and given to patient: no  -------------------------------------------------------------------------------------------------------------------  Please call our office at [phone] if you have any  of the following symptoms:   Fever of 100.5 or greater  Chills  Shortness of breath  Swelling or pain in one leg    After office hours an answering service is available and will contact a provider for emergencies or if you are experiencing any of the above symptoms. Patient does express an interest in My Chart. My Chart log in information explained on the after visit summary printout at the . [facility] [address] desk. Farooq Bustos RN  Solid Tumor Nurse Navigator  [phone] cell phone   For Urgent Matters (after hours and weekends), please call Triage at [phone]. For Emergencies, please call 911.

## 2022-11-21 NOTE — ED NOTES
Arrives via ems from home. Hx leukemia.    Since Thursday pt developed shingles to left rib area  Antiviral started today, although had emesis x2 and can not keep medicine down despite using zofran at home   184/82 HR 80 RR 20 O2 98 on 2L (baseline)      Sis Garcia RN  11/21/22 [id number]

## 2022-11-21 NOTE — PROGRESS NOTES
11/17/2022 - Pt seen by Dr. Jina Lynn for follow-up from chemotherapy, scan, and to discuss immunotherapy, Durvalumab. Pt ready to start and drug has been approved. Chemo education completed, with signed consent and uploaded into pt's chart. Scheduled tx for every 2 weeks, will have pt RTC in 2 for the 2nd dose and thereafter, will schedule tx for every 28 days. Pt VU. Labs reviewed; OK to proceed to infusion. Encouraged to call with questions. Navigation will continue to follow.

## 2022-11-21 NOTE — ED PROVIDER NOTES
Emergency Department Provider Note                   PCP:                Rosa Isela Robison MD               Age: 67 y.o. Sex: female       ICD-10-CM    1. Herpes zoster without complication  L15.5 HYDROcodone-acetaminophen (NORCO) 5-325 MG per tablet      2. Chest pain, unspecified type  R07.9           DISPOSITION          MDM  Number of Diagnoses or Management Options  Chest pain, unspecified type  Herpes zoster without complication  Diagnosis management comments: Patient did report chest pain while in the emergency department the last approximate 2 minutes resolved with nitroglycerin. Patient declined laboratory testing as well as EKG and chest x-ray. Patient states is prior related to her pain and declines again any additional work-up. This is witnessed by nursing staff. Patient's vitals are currently stable and patient has no fever. Patient is seeking pain control and has been referred back to her PCP as well as oncologist possibly referral to pain management for symptoms persist.  We will increase her dose of Norco to 10 mg every 6 hours as well as add Toradol 10 milligrams every 8 hours as needed pain       Amount and/or Complexity of Data Reviewed  Review and summarize past medical records: yes  Independent visualization of images, tracings, or specimens: yes    Risk of Complications, Morbidity, and/or Mortality  Presenting problems: moderate  Management options: moderate               No orders of the defined types were placed in this encounter. Medications   HYDROcodone-acetaminophen (NORCO) 5-325 MG per tablet 2 tablet (has no administration in time range)   ketorolac (TORADOL) injection 30 mg (has no administration in time range)       New Prescriptions    HYDROCODONE-ACETAMINOPHEN (NORCO) 5-325 MG PER TABLET    Take 2 tablets by mouth every 6 hours as needed for Pain for up to 20 days. Intended supply: 3 days.  Take lowest dose possible to manage pain    KETOROLAC (TORADOL) 10 MG TABLET    Take 1 tablet by mouth every 6 hours as needed for Pain    NALOXONE (NARCAN) 4 MG/0.1ML LIQD NASAL SPRAY    1 spray by Nasal route as needed for Opioid Reversal        Billie Perdue is a 67 y.o. female who presents to the Emergency Department with chief complaint of  No chief complaint on file. Patient is a 79-year-old female with a history of CAD, non-small cell lung cancer, COPD who presents with increasing pain in the left lateral and posterior thoracic area secondary to her recent diagnosis of shingles. Patient states she is taking Valtrex 3 times a day and she has been given a 4 Norco 5 mg as well as tramadol and it has not significantly improved her pain. Patient denies any fevers or chills and denies any additional redness to the rash area. Patient also stated she did have chest pain lasting proxy 2 minutes resolved with nitroglycerin nonradiating. Patient does have a significant cardiac history. Patient is on home O2. Patient denies a cough or congestion denies any wheezing. The history is provided by the patient. Rash  Location: Left lateral and posterior thoracic pain. Quality: painful    Pain details:     Quality:  Aching and burning    Severity:  Moderate    Onset quality:  Gradual    Duration:  5 days    Timing:  Constant    Progression:  Worsening  Severity:  Moderate  Onset quality:  Gradual  Duration:  5 days  Timing:  Constant  Context: not animal contact, not chemical exposure and not insect bite/sting        Review of Systems   Skin:  Positive for rash. All other systems reviewed and are negative.     Past Medical History:   Diagnosis Date    Asthma     Closed right hip fracture ([facility] [address] 02/2022    Coronary artery disease     Former cigarette smoker     Fracture of right femur ([facility] [address] 02/2022    Hyperthyroidism     Lung mass     Multiple closed anterior-posterior compression fractures of pelvis ([facility] [address] 02/2022    Non-small cell lung cancer (HCC)     Severe chronic obstructive pulmonary disease ([facility] [address]     Subdural hematoma 2022    Thyroid nodule         Past Surgical History:   Procedure Laterality Date    BRONCHOSCOPY N/A 2022    BRONCHOSCOPY ENDOBRONCHIAL ULTRASOUND performed by Kolby Otero MD at [facility] ENDOSCOPY    CAROTID ENDARTERECTOMY Right     CATARACT REMOVAL Bilateral     CORONARY STENT PLACEMENT  2022    HIP FRACTURE SURGERY Right 2022    IR PORT PLACEMENT EQUAL OR GREATER THAN 5 YEARS  2022    IR PORT PLACEMENT EQUAL OR GREATER THAN 5 YEARS 2022 [facility]    WISDOM TOOTH EXTRACTION      as a teenager        Family History   Adopted: Yes   Problem Relation Age of Onset    No Known Problems Mother     No Known Problems Father     Thyroid Disease Daughter         Social History     Socioeconomic History    Marital status:    Tobacco Use    Smoking status: Former     Packs/day: 0.50     Years: 50.00     Pack years: 25.00     Types: Cigarettes     Quit date:      Years since quittin.8    Smokeless tobacco: Never   Substance and Sexual Activity    Alcohol use: Not Currently         Promethazine     Previous Medications    ACETAMINOPHEN (TYLENOL) 325 MG TABLET    Take by mouth every 4 hours as needed    ALBUTEROL (PROVENTIL) (2.5 MG/3ML) 0.083% NEBULIZER SOLUTION    TAKE 3 ML BY NEBULIZATION EVERY 6 HOURS AS NEEDED FOR SHORTNESS OF BREATH    ASPIRIN 81 MG CHEWABLE TABLET    Take 81 mg by mouth daily    ATORVASTATIN (LIPITOR) 80 MG TABLET    Take 80 mg by mouth at bedtime TAKE 1 TABLET BY MOUTH DAILY    AZITHROMYCIN (ZITHROMAX) 500 MG TABLET    Take by mouth    BUDESONIDE-FORMOTEROL (SYMBICORT) 160-4.5 MCG/ACT AERO    Inhale 2 puffs into the lungs in the morning and 2 puffs before bedtime.     CETIRIZINE (ZYRTEC) 10 MG TABLET    Take 10 mg by mouth daily as needed    CLOPIDOGREL (PLAVIX) 75 MG TABLET    Take 75 mg by mouth daily    ESCITALOPRAM (LEXAPRO) 10 MG TABLET    Take 10 mg by mouth daily    EZETIMIBE (ZETIA) 10 MG TABLET    Take 10 mg by mouth at bedtime    LIDOCAINE-PRILOCAINE (EMLA) 2.5-2.5 % CREAM    Apply topically as needed. MAGIC MOUTHWASH (MIRACLE MOUTHWASH)    Swish and swallow 5 mLs 4 times daily as needed for Irritation Equal parts viscous lidocaine, maalox, nystatin, benadryl    METHIMAZOLE (TAPAZOLE) 10 MG TABLET    Take 0.5 tablets by mouth daily    METOPROLOL SUCCINATE (TOPROL XL) 100 MG EXTENDED RELEASE TABLET    Take 100 mg by mouth daily    NITROGLYCERIN (NITROSTAT) 0.4 MG SL TABLET    Place 0.4 mg under the tongue 3 times daily as needed    ONDANSETRON (ZOFRAN ODT) 4 MG DISINTEGRATING TABLET    Take 1 tablet by mouth every 8 hours as needed for Nausea or Vomiting    OXYGEN    Inhale into the lungs 2 LPM    PANTOPRAZOLE (PROTONIX) 40 MG TABLET    Take 40 mg by mouth 2 times daily    PREDNISONE (DELTASONE) 10 MG TABLET    Take 10 mg by mouth daily (with breakfast)    PREDNISONE (DELTASONE) 5 MG TABLET        PROCHLORPERAZINE (COMPAZINE) 10 MG TABLET    Take 1 tablet by mouth every 6 hours as needed (chemo-induced nausea)    PSEUDOEPHEDRINE-GUAIFENESIN (MUCINEX D PO)    Take by mouth in the morning and at bedtime    ROFLUMILAST (DALIRESP) 500 MCG TABLET    Take 500 mcg by mouth nightly    TIOTROPIUM (SPIRIVA RESPIMAT) 2.5 MCG/ACT AERS INHALER    Inhale 2 puffs into the lungs in the morning. TORSEMIDE (DEMADEX) 10 MG TABLET    Take 10 mg by mouth daily    TRAMADOL (ULTRAM) 50 MG TABLET    Take 2 tablets by mouth 4 times daily for 90 days. Z51.5 Palliative Care    VALACYCLOVIR (VALTREX) 1 G TABLET    Take 1 tablet by mouth 3 times daily for 7 days        Vitals signs and nursing note reviewed. Patient Vitals for the past 4 hrs:   Temp Pulse Resp BP SpO2   11/21/22 1737 97.9 °F (36.6 °C) 93 18 (!) 147/86 100 %          Physical Exam  Vitals and nursing note reviewed. Constitutional:       Appearance: Normal appearance. HENT:      Head: Normocephalic and atraumatic.       Nose: Nose normal. Mouth/Throat:      Mouth: Mucous membranes are moist.      Pharynx: Oropharynx is clear. Eyes:      Extraocular Movements: Extraocular movements intact. Conjunctiva/sclera: Conjunctivae normal.      Pupils: Pupils are equal, round, and reactive to light. Cardiovascular:      Rate and Rhythm: Normal rate. Pulses: Normal pulses. Pulmonary:      Effort: Pulmonary effort is normal.   Abdominal:      General: Abdomen is flat. Bowel sounds are normal.      Palpations: Abdomen is soft. Musculoskeletal:         General: Normal range of motion. Cervical back: Normal range of motion and neck supple. Skin:     General: Skin is warm. Capillary Refill: Capillary refill takes less than 2 seconds. Comments: Left posterior and lateral thoracic area redness and blistering consistent with shingles. No obvious secondary infection present. Neurological:      General: No focal deficit present. Mental Status: She is alert and oriented to person, place, and time. Mental status is at baseline. Psychiatric:         Mood and Affect: Mood normal.         Behavior: Behavior normal.         Thought Content:  Thought content normal.         Judgment: Judgment normal.        Procedures    Results for orders placed or performed during the hospital encounter of 11/21/22   Magnesium   Result Value Ref Range    Magnesium 2.1 1.8 - 2.4 mg/dL   Comprehensive Metabolic Panel   Result Value Ref Range    Sodium 136 133 - 143 mmol/L    Potassium 3.8 3.5 - 5.1 mmol/L    Chloride 103 101 - 110 mmol/L    CO2 28 21 - 32 mmol/L    Anion Gap 5 2 - 11 mmol/L    Glucose 105 (H) 65 - 100 mg/dL    BUN 8 8 - 23 MG/DL    Creatinine 0.70 0.6 - 1.0 MG/DL    Est, Glom Filt Rate >60 >60 ml/min/1.73m2    Calcium 8.9 8.3 - 10.4 MG/DL    Total Bilirubin 0.3 0.2 - 1.1 MG/DL    ALT 28 12 - 65 U/L    AST 24 15 - 37 U/L    Alk Phosphatase 55 50 - 136 U/L    Total Protein 7.0 6.3 - 8.2 g/dL    Albumin 3.2 3.2 - 4.6 g/dL    Globulin 3.8 2.8 - 4.5 g/dL    Albumin/Globulin Ratio 0.8 0.4 - 1.6     CBC with Auto Differential   Result Value Ref Range    WBC 5.7 4.3 - 11.1 K/uL    RBC 3.12 (L) 4.05 - 5.2 M/uL    Hemoglobin 9.8 (L) 11.7 - 15.4 g/dL    Hematocrit 30.8 (L) 35.8 - 46.3 %    MCV 98.7 82.0 - 102.0 FL    MCH 31.4 26.1 - 32.9 PG    MCHC 31.8 31.4 - 35.0 g/dL    RDW 18.3 (H) 11.9 - 14.6 %    Platelets 273 617 - 232 K/uL    MPV 8.6 (L) 9.4 - 12.3 FL    nRBC 0.00 0.0 - 0.2 K/uL    Seg Neutrophils 76 43 - 78 %    Lymphocytes 6 (L) 13 - 44 %    Monocytes 13 (H) 4.0 - 12.0 %    Eosinophils % 3 0.5 - 7.8 %    Basophils 1 0.0 - 2.0 %    Immature Granulocytes 1 0.0 - 5.0 %    Segs Absolute 4.4 1.7 - 8.2 K/UL    Absolute Lymph # 0.3 (L) 0.5 - 4.6 K/UL    Absolute Mono # 0.8 0.1 - 1.3 K/UL    Absolute Eos # 0.1 0.0 - 0.8 K/UL    Basophils Absolute 0.0 0.0 - 0.2 K/UL    Absolute Immature Granulocyte 0.0 0.0 - 0.5 K/UL    Differential Type AUTOMATED     TSH   Result Value Ref Range    TSH, 3RD GENERATION 12.200 (H) 0.358 - 3 uIU/mL        No orders to display                       Voice dictation software was used during the making of this note. This software is not perfect and grammatical and other typographical errors may be present. This note has not been completely proofread for errors.       Jose Francisco Shafer MD  11/21/22 1476 [facility] MD [place]  11/21/22 [id number]

## 2022-11-21 NOTE — ED NOTES
Pt denying need for chest pain workup. Pt believes chest discomfort was related to generalized shingles pain. Pt refusing desired EKG & lab work offered by MD Willie Koenig.      Samia Tamayo RN  11/21/22 [id number]

## 2022-11-21 NOTE — PROGRESS NOTES
11/21/2022 - Pt seen by Dr. Elen Mcbride, as a sick visit. C/o pain to left lower back area and rash on scalp that itches. Recommend using Cortisone cream and Benadryl gel for rash/  itching. Pain started on 11/18, started at left lower back area and it is not getting any better with taking pain medications: Tramadol/Hydrocodone, alternating both. Will treat for shingles will send in a Rx: for Valaciclovir, take 3 times/day x 7 days. Pt has enough Tramadol; does not need refilled. Pt will RTC in 1 week for 2nd cycle of Durvalumab. Appt. Dates and times given to pt; pt verbalized understanding. Rx sent to [facility] pharmacy; pt VU. Encouraged to call with questions. Navigation will continue to follow.       t

## 2022-11-22 ENCOUNTER — TELEPHONE (OUTPATIENT)
Dept: PULMONOLOGY | Age: 72
End: 2022-11-22

## 2022-11-22 DIAGNOSIS — M79.2 NEUROPATHIC PAIN: Primary | ICD-10-CM

## 2022-11-22 DIAGNOSIS — Z51.5 PALLIATIVE CARE PATIENT: ICD-10-CM

## 2022-11-22 DIAGNOSIS — B02.9 HERPES ZOSTER WITHOUT COMPLICATION: ICD-10-CM

## 2022-11-22 DIAGNOSIS — R11.0 NAUSEA: ICD-10-CM

## 2022-11-22 RX ORDER — GABAPENTIN 100 MG/1
300 CAPSULE ORAL 3 TIMES DAILY
Qty: 90 CAPSULE | Refills: 2 | Status: SHIPPED | OUTPATIENT
Start: 2022-11-22 | End: 2022-12-22

## 2022-11-22 RX ORDER — ONDANSETRON HYDROCHLORIDE 8 MG/1
8 TABLET, FILM COATED ORAL EVERY 8 HOURS PRN
Qty: 30 TABLET | Refills: 2 | Status: SHIPPED | OUTPATIENT
Start: 2022-11-22 | End: 2022-12-22

## 2022-11-22 NOTE — TELEPHONE ENCOUNTER
Lengthy conversation with the patient's matthew daughter regarding this escalation in pain associated with zoster. The onset of the symptoms was last Thursday with sensitivity, pain, and discomfort. Currently, the pain is at a 10+ without any relief using hydrocodone, tylenol, nor tramadol. No prior history of shingles. We had tried gabapentin in the past for some pain control (not nerved related). She has accompanying nausea and loss of appetite secondary to the pain. We developed a plan to address immediate pain crisis:  Zofran dosing + Benadryl (needs some sedation for the next few days to sleep) - Compazine doesn't seem to be controlling her nausea at this point. Start Gabapentin 100mg NOW, repeat in 2 hours if not effective, repeat in 2 hours again to reach 300mg dosing. Then, use 300mg TID for at least 10 days. Can use hydrocodone or/and tramadol but most likely will not be effective for neuropathetic pain secondary to zoster. Currently has a supply of these meds. Routed RX for Zofran & Gabapentin. The goal is to break the pain crisis and restore sleep.

## 2022-11-28 NOTE — TELEPHONE ENCOUNTER
Pt called in regards to 11/30/22 @ 215pm appt and 12/1/22 @ 715am infusion/PT wanting to know if apps can be on same day

## 2022-11-30 ENCOUNTER — OFFICE VISIT (OUTPATIENT)
Dept: ONCOLOGY | Age: 72
End: 2022-11-30
Payer: MEDICARE

## 2022-11-30 ENCOUNTER — HOSPITAL ENCOUNTER (OUTPATIENT)
Dept: INFUSION THERAPY | Age: 72
Discharge: HOME | End: 2022-11-30
Payer: MEDICARE

## 2022-11-30 ENCOUNTER — OFFICE VISIT (OUTPATIENT)
Dept: PULMONOLOGY | Age: 72
End: 2022-11-30
Payer: MEDICARE

## 2022-11-30 VITALS
SYSTOLIC BLOOD PRESSURE: 120 MMHG | RESPIRATION RATE: 20 BRPM | TEMPERATURE: 98 F | HEIGHT: 66 IN | HEART RATE: 81 BPM | DIASTOLIC BLOOD PRESSURE: 80 MMHG | WEIGHT: 130 LBS | BODY MASS INDEX: 20.89 KG/M2 | OXYGEN SATURATION: 95 %

## 2022-11-30 VITALS
HEIGHT: 66 IN | DIASTOLIC BLOOD PRESSURE: 62 MMHG | OXYGEN SATURATION: 93 % | TEMPERATURE: 98.2 F | SYSTOLIC BLOOD PRESSURE: 134 MMHG | BODY MASS INDEX: 20.34 KG/M2 | HEART RATE: 86 BPM | WEIGHT: 126.6 LBS | RESPIRATION RATE: 16 BRPM

## 2022-11-30 DIAGNOSIS — R63.4 WEIGHT LOSS: ICD-10-CM

## 2022-11-30 DIAGNOSIS — J44.9 COPD, VERY SEVERE (HCC): Primary | ICD-10-CM

## 2022-11-30 DIAGNOSIS — C34.90 NON-SMALL CELL LUNG CANCER, UNSPECIFIED LATERALITY (HCC): Primary | ICD-10-CM

## 2022-11-30 DIAGNOSIS — C34.90 NON-SMALL CELL LUNG CANCER, UNSPECIFIED LATERALITY (HCC): ICD-10-CM

## 2022-11-30 DIAGNOSIS — R11.0 NAUSEA: ICD-10-CM

## 2022-11-30 DIAGNOSIS — Z79.899 HIGH RISK MEDICATION USE: ICD-10-CM

## 2022-11-30 DIAGNOSIS — B02.8 HERPES ZOSTER WITH OTHER COMPLICATION: ICD-10-CM

## 2022-11-30 DIAGNOSIS — J96.11 CHRONIC RESPIRATORY FAILURE WITH HYPOXIA (HCC): ICD-10-CM

## 2022-11-30 DIAGNOSIS — Z87.891 PERSONAL HISTORY OF TOBACCO USE, PRESENTING HAZARDS TO HEALTH: ICD-10-CM

## 2022-11-30 LAB
ALBUMIN SERPL-MCNC: 3.3 G/DL (ref 3.2–4.6)
ALBUMIN/GLOB SERPL: 0.8 {RATIO} (ref 0.4–1.6)
ALP SERPL-CCNC: 59 U/L (ref 50–136)
ALT SERPL-CCNC: 18 U/L (ref 12–65)
ANION GAP SERPL CALC-SCNC: 8 MMOL/L (ref 2–11)
AST SERPL-CCNC: 14 U/L (ref 15–37)
BASOPHILS # BLD: 0 K/UL (ref 0–0.2)
BASOPHILS NFR BLD: 1 % (ref 0–2)
BILIRUB SERPL-MCNC: 0.3 MG/DL (ref 0.2–1.1)
BUN SERPL-MCNC: 19 MG/DL (ref 8–23)
CALCIUM SERPL-MCNC: 9.2 MG/DL (ref 8.3–10.4)
CHLORIDE SERPL-SCNC: 95 MMOL/L (ref 101–110)
CO2 SERPL-SCNC: 30 MMOL/L (ref 21–32)
CREAT SERPL-MCNC: 0.8 MG/DL (ref 0.6–1)
DIFFERENTIAL METHOD BLD: ABNORMAL
EOSINOPHIL # BLD: 0.3 K/UL (ref 0–0.8)
EOSINOPHIL NFR BLD: 4 % (ref 0.5–7.8)
ERYTHROCYTE [DISTWIDTH] IN BLOOD BY AUTOMATED COUNT: 18.5 % (ref 11.9–14.6)
GLOBULIN SER CALC-MCNC: 4.3 G/DL (ref 2.8–4.5)
GLUCOSE SERPL-MCNC: 123 MG/DL (ref 65–100)
HCT VFR BLD AUTO: 30.8 % (ref 35.8–46.3)
HGB BLD-MCNC: 9.8 G/DL (ref 11.7–15.4)
IMM GRANULOCYTES # BLD AUTO: 0 K/UL (ref 0–0.5)
IMM GRANULOCYTES NFR BLD AUTO: 1 % (ref 0–5)
LYMPHOCYTES # BLD: 0.4 K/UL (ref 0.5–4.6)
LYMPHOCYTES NFR BLD: 5 % (ref 13–44)
MAGNESIUM SERPL-MCNC: 2.1 MG/DL (ref 1.8–2.4)
MCH RBC QN AUTO: 31.2 PG (ref 26.1–32.9)
MCHC RBC AUTO-ENTMCNC: 31.8 G/DL (ref 31.4–35)
MCV RBC AUTO: 98.1 FL (ref 82–102)
MONOCYTES # BLD: 1.1 K/UL (ref 0.1–1.3)
MONOCYTES NFR BLD: 13 % (ref 4–12)
NEUTS SEG # BLD: 6 K/UL (ref 1.7–8.2)
NEUTS SEG NFR BLD: 76 % (ref 43–78)
NRBC # BLD: 0 K/UL (ref 0–0.2)
PLATELET # BLD AUTO: 278 K/UL (ref 150–450)
PMV BLD AUTO: 8.9 FL (ref 9.4–12.3)
POTASSIUM SERPL-SCNC: 3.7 MMOL/L (ref 3.5–5.1)
PROT SERPL-MCNC: 7.6 G/DL (ref 6.3–8.2)
RBC # BLD AUTO: 3.14 M/UL (ref 4.05–5.2)
SODIUM SERPL-SCNC: 133 MMOL/L (ref 133–143)
WBC # BLD AUTO: 7.8 K/UL (ref 4.3–11.1)

## 2022-11-30 PROCEDURE — 3074F SYST BP LT 130 MM HG: CPT | Performed by: INTERNAL MEDICINE

## 2022-11-30 PROCEDURE — 36591 DRAW BLOOD OFF VENOUS DEVICE: CPT

## 2022-11-30 PROCEDURE — G8420 CALC BMI NORM PARAMETERS: HCPCS | Performed by: INTERNAL MEDICINE

## 2022-11-30 PROCEDURE — 1123F ACP DISCUSS/DSCN MKR DOCD: CPT | Performed by: INTERNAL MEDICINE

## 2022-11-30 PROCEDURE — G8400 PT W/DXA NO RESULTS DOC: HCPCS | Performed by: INTERNAL MEDICINE

## 2022-11-30 PROCEDURE — G8427 DOCREV CUR MEDS BY ELIG CLIN: HCPCS | Performed by: INTERNAL MEDICINE

## 2022-11-30 PROCEDURE — 85025 COMPLETE CBC W/AUTO DIFF WBC: CPT

## 2022-11-30 PROCEDURE — 3078F DIAST BP <80 MM HG: CPT | Performed by: INTERNAL MEDICINE

## 2022-11-30 PROCEDURE — 99215 OFFICE O/P EST HI 40 MIN: CPT | Performed by: INTERNAL MEDICINE

## 2022-11-30 PROCEDURE — 2580000003 HC RX 258: Performed by: NURSE PRACTITIONER

## 2022-11-30 PROCEDURE — 83735 ASSAY OF MAGNESIUM: CPT

## 2022-11-30 PROCEDURE — 3023F SPIROM DOC REV: CPT | Performed by: INTERNAL MEDICINE

## 2022-11-30 PROCEDURE — G8484 FLU IMMUNIZE NO ADMIN: HCPCS | Performed by: INTERNAL MEDICINE

## 2022-11-30 PROCEDURE — 1090F PRES/ABSN URINE INCON ASSESS: CPT | Performed by: INTERNAL MEDICINE

## 2022-11-30 PROCEDURE — 1036F TOBACCO NON-USER: CPT | Performed by: INTERNAL MEDICINE

## 2022-11-30 PROCEDURE — 99214 OFFICE O/P EST MOD 30 MIN: CPT | Performed by: INTERNAL MEDICINE

## 2022-11-30 PROCEDURE — 80053 COMPREHEN METABOLIC PANEL: CPT

## 2022-11-30 PROCEDURE — 3017F COLORECTAL CA SCREEN DOC REV: CPT | Performed by: INTERNAL MEDICINE

## 2022-11-30 RX ORDER — SODIUM CHLORIDE 0.9 % (FLUSH) 0.9 %
10 SYRINGE (ML) INJECTION PRN
Status: DISCONTINUED | OUTPATIENT
Start: 2022-11-30 | End: 2022-12-01 | Stop reason: HOSPADM

## 2022-11-30 RX ORDER — ACYCLOVIR 800 MG/1
800 TABLET ORAL 2 TIMES DAILY
Qty: 60 TABLET | Refills: 2 | Status: SHIPPED | OUTPATIENT
Start: 2022-11-30 | End: 2022-12-30

## 2022-11-30 RX ADMIN — SODIUM CHLORIDE, PRESERVATIVE FREE 10 ML: 5 INJECTION INTRAVENOUS at 14:09

## 2022-11-30 ASSESSMENT — PATIENT HEALTH QUESTIONNAIRE - PHQ9
SUM OF ALL RESPONSES TO PHQ QUESTIONS 1-9: 0
SUM OF ALL RESPONSES TO PHQ9 QUESTIONS 1 & 2: 0
SUM OF ALL RESPONSES TO PHQ QUESTIONS 1-9: 0
1. LITTLE INTEREST OR PLEASURE IN DOING THINGS: 0
SUM OF ALL RESPONSES TO PHQ QUESTIONS 1-9: 0
2. FEELING DOWN, DEPRESSED OR HOPELESS: 0
SUM OF ALL RESPONSES TO PHQ QUESTIONS 1-9: 0

## 2022-11-30 NOTE — PROGRESS NOTES
[facility] Hematology & Oncology: Office Visit Progress Note    Chief Complaint:    Non-small cell lung cancer stage III    History of Present Illness:  Reason for Referral:  Non-small cell lung cancer     Referring Provider:  Dr. Karol Krishnan, [facility] Pulmonary and Critical Care     Primary Care Provider:  Dr. Sarai Malcolm, [facility])     Family History of Cancer/Hematologic Disorders:  No documented family history     Presenting Symptoms:   Presented to [facility] ED with COPD exacerbation     Ms. Christopher Garcia is a 67 y.o.  female with a medical history of COPD, asthma, oxygen dependent, CAD, closed right hip and femur fracture (2/22), subdural hematoma (3/22), MI, and thyroid disease. Surgical history includes bilateral cataract removal, right hip surgery, and right carotid endarterectomy. She is a former smoker of 50 years but quit in 2017. Denies alcohol use. On 6/9/22 patient presented to the [facility] ED with increased shortness of breath over several days. Admitted with exacerbation of COPD. CXR showed unchanged consolidation of left lower lobe lung. On 6/11 CT Chest PE protocol was performed which revealed no PE but a increase in the left lower lobe lung mass. She was discharged home on 6/12 with instruction to follow up with pulmonary within 1 week. On 6/24/22 she had a PET scan which showed known lung mass and left hilar and subcarinal mediastinal activity as well. On 6/28/22 she was seen in follow up with pulmonary. PET imaging reviewed. Recommended EBUS. Procedure was performed on 7/20/22. Pathology showed malignant cells 11L lymph node. Referral to medical and radiation oncology for evaluation and treatment recommendations.       MRI Brain scheduled on 8/2 and radiation oncology consult on 7/29.       6/9/22 [facility] ED CXR  FINDINGS: The lungs are hyperexpanded, consistent with emphysematous changes on the prior CT chest. Areas of nodular consolidation in the left lower lobe are unchanged. No acute infiltrate or pulmonary edema is seen. The heart size, mediastinal contour and pulmonary vasculature are normal. There are coronary artery calcifications or stents. The thorax or pleural effusion is seen. IMPRESSION:  1. Emphysema. 2. Coronary artery disease. 3. Unchanged nodular consolidation in the left lower lobe. 6/11/22 [facility]F CT Chest PE protocol  Findings: There is no pleural or pericardial effusion. The heart and great vessels are unremarkable. There are no filling defects within the pulmonary arteries to suggest pulmonary emboli. Moderate emphysematous changes are present. Patchy interstitial opacity is present within the right upper lobe  anteriorly. Similar but less impressive findings are newly visualized within the left upper lobe anteriorly. Again noted is the ill-defined masslike opacity within the left lower lobe measuring approximately 2.0 x 3.6 cm previously measured at approximately 1.7 x 2.8 cm. Also is a ground glass opacity within the left upper lobe measuring 1.5 cm, unchanged when remeasured on the previous study. A left hilar lymph node  measures 1.4 cm in short axis, unchanged. There is a small left Bochdalek hernia containing fat. Imaging of the upper abdomen is unremarkable. There are compression fractures within the thoracic and lumbar spine with mild height loss. The fractures at T10 and T11 has occurred in the interim. IMPRESSION:  1. No evidence of pulmonary embolism. 2. Interval increase in left lower lobe mass suspicious for bronchogenic carcinoma. A left hilar lymph node also is enlarged concerning for metastatic adenopathy. 3. Newly visualized interstitial opacities within the upper lobes, right greater than left, most likely infectious. 4. Stable left lower lobe groundglass opacity. 5.  Interval compression fractures with mild height loss at T10 and T11.     6/24/22 [facility] PET  FINDINGS:  NECK/CHEST:  No suspicious activity is seen in the neck. Only minimal nonfocal activity is seen about left posterior facets in the mid cervical spine likely related to benign degenerative changes. No clear aggressive osseous lesion is suggested on limited CT images. The most clear suspicious activity is seen associated with a large heterogeneous lesion in the left lower lobe demonstrating spiculated appearing components best appreciated on axial image 92 measuring 4.4 cm x 2.3 cm in greatest transverse dimensions and with a maximum SUV of 11.2 g/mL. Malignancy is not excluded given the appearance. Additional focal appearing activity is seen in the left hilum on  pet/CT image 85 with a maximum SUV of 9.1 g/mL and in the subcarinal mediastinum on axial image 84 with a maximum SUV of 6.3 g/mL concerning for left hilar and subcarinal mediastinal diego metastases, respectively. Additional mild activity is seen in the mediastinum and left hilum on pet/CT image 76 which does appear to correspond to small prevascular and left hilar lymph nodes seen on axial CT  image 39 of the prior the chest dated 6/11/2022 which are suspicious for additional early diego metastases. No clearly suspicious activity is otherwise seen in the chest. Mild to moderate activity is seen in the medial right upper lobe on pet/CT image 56 with a maximum SUV of 4.8 g/mL. Although the degree of activity is suspicious, this appears to correspond to a fluid containing cystic structure in the right upper lobe with a CT appearance being more suggestive of an inflammatory process. Additional soft tissue activity is seen over the lateral scapula which is most consistent with benign muscle activity. ABDOMEN/PELVIS:  The only potentially suspicious activity is skin activity involving the left  proximal medial thigh seen on pet/CT image 257 with a maximum SUV of 6.3 g/mL which appears to be associated with focal skin thickening.  This is not felt to be related to the patient's lung cancer. However, a dermal neoplasm or focal  inflammatory process is not excluded given the appearance. This should be further assessed with direct visualization. No suspicious activity is otherwise seen below the diaphragms. Activity is seen adjacent to the right femoral neck. However, this appears to correspond to heterotopic bone formation from hip  surgery at this level without aggressive features suggested therefore is not worrisome. No focal hepatic activity is seen. No contour deforming, or hypermetabolic adrenal lesion is seen. Advanced atherosclerotic calcification is seen of the abdominal aorta. IMPRESSION:  1. Heterogeneous left lower lobe mass demonstrating irregular spiculated components and measuring 4.4 cm x 2.3 cm in greatest transverse dimensions and with a maximum SUV of 11.2 g/mL. Malignancy is not excluded given the appearance. Further evaluation as clinically indicated is recommended. 2. Additional left hilar and subcarinal mediastinal activity concerning for diego metastases. Additional mild activity is seen within a prevascular mediastinal lymph node an additional left hilar lymph nodes for which additional early diego metastases are not excluded. 3. Focal activity associated with focal skin thickening in the most medial, proximal left thigh seen on pet/CT image 257. This is unlikely to be related to potential lung cancer although could represent an additional dermal neoplasm or focal abnormal inflammatory process otherwise. This should be clinically  evaluated with direct visualization. This has been marked on image to assist with clinical correlation. 4. Mild activity associated with a cystic abnormality in the medial right upper lobe. Although the degree of activity is suspicious (maximum SUV of 4.8 g/mL) the CT features are more consistent with a potential inflammatory process. Attention on follow-up studies is recommended. 7/20/22 [facility] Pathology EBUS  DIAGNOSIS  11L Lymph Node, Fine Needle Aspiration:      MALIGNANT CELLS PRESENT. Cell block: Malignant cells present. Immunohistochemical findings will follow in an addendum. Notes from Referring Provider:  None     Other Pertinent Information:  Covid vaccination - 1/28/21, 2/18/21, and 9/25/21     Radiation oncology consult with Dr. Arely López scheduled for 7/29/22 at 1 pm.     MRI Brain is scheduled for 8/2/22 at 8 am.       Interim history update in A/P. Review of Systems:  Constitutional Fatigue. Denies fever or chills. Denies weight loss or appetite changes. Denies anorexia. HEENT Denies trauma, bluring vision, hearing loss, ear pain, nosebleeds, sore throat, neck pain and ear discharge. Skin Rash. Denies lesions. Lungs Denies shortness of breath, cough, sputum production or hemoptysis. Cardiovascular Denies chest pain, palpitations, orthopnea, claudication and leg swelling. Gastrointestinal Odynophagia. Denies nausea, vomiting, bowel changes. Denies bloody or black stools. Denies abdominal pain.  Denies dysuria, frequency or hesitancy of urination   Neuro Denies headaches, visual changes or ataxia. Denies dizziness, tingling, tremors, sensory change, speech change, focal weakness and headaches. Hematology Denies nasal/gum bleeding, denies easy bruise   Endo Denies heat/cold intolerance, denies diabetes. MSK Denies back pain, swollen legs, myalgias and falls. Psychiatric/Behavioral Denies depression and substance abuse. The patient is not nervous/anxious.        Allergies   Allergen Reactions    Promethazine Nausea And Vomiting and Other (See Comments)     Severe vomiting      Past Medical History:   Diagnosis Date    Asthma     Closed right hip fracture ([facility] [address] 02/2022    Coronary artery disease     Former cigarette smoker     Fracture of right femur ([facility] [address] 02/2022    Hyperthyroidism     Lung mass     Multiple closed anterior-posterior compression fractures of pelvis ([facility] [address] 2022    Non-small cell lung cancer (HCC)     Severe chronic obstructive pulmonary disease (HCC)     Subdural hematoma 2022    Thyroid nodule      Past Surgical History:   Procedure Laterality Date    BRONCHOSCOPY N/A 2022    BRONCHOSCOPY ENDOBRONCHIAL ULTRASOUND performed by Kwabena Gonzales MD at [facility] ENDOSCOPY    CAROTID ENDARTERECTOMY Right     CATARACT REMOVAL Bilateral     CORONARY STENT PLACEMENT  2022    HIP FRACTURE SURGERY Right 2022    IR PORT PLACEMENT EQUAL OR GREATER THAN 5 YEARS  2022    IR PORT PLACEMENT EQUAL OR GREATER THAN 5 YEARS 2022 [facility] SPE[facility]    WISDOM TOOTH EXTRACTION      as a teenager     Family History   Adopted: Yes   Problem Relation Age of Onset    No Known Problems Mother     No Known Problems Father     Thyroid Disease Daughter      Social History     Socioeconomic History    Marital status:      Spouse name: Not on file    Number of children: Not on file    Years of education: Not on file    Highest education level: Not on file   Occupational History    Not on file   Tobacco Use    Smoking status: Former     Packs/day: 0.50     Years: 50.00     Pack years: 25.00     Types: Cigarettes     Quit date:      Years since quittin.9    Smokeless tobacco: Never   Substance and Sexual Activity    Alcohol use: Not Currently    Drug use: Not on file    Sexual activity: Not on file   Other Topics Concern    Not on file   Social History Narrative    Not on file     Social Determinants of Health     Financial Resource Strain: Not on file   Food Insecurity: Not on file   Transportation Needs: Not on file   Physical Activity: Not on file   Stress: Not on file   Social Connections: Not on file   Intimate Partner Violence: Not on file   Housing Stability: Not on file     Current Outpatient Medications   Medication Sig Dispense Refill    ondansetron (ZOFRAN) 8 MG tablet Take 1 tablet by mouth every 8 hours as needed for acetaminophen (TYLENOL) 325 MG tablet Take by mouth every 4 hours as needed      aspirin 81 MG chewable tablet Take 81 mg by mouth daily      atorvastatin (LIPITOR) 80 MG tablet Take 80 mg by mouth at bedtime TAKE 1 TABLET BY MOUTH DAILY      cetirizine (ZYRTEC) 10 MG tablet Take 10 mg by mouth daily as needed      clopidogrel (PLAVIX) 75 MG tablet Take 75 mg by mouth daily      escitalopram (LEXAPRO) 10 MG tablet Take 10 mg by mouth daily      ezetimibe (ZETIA) 10 MG tablet Take 10 mg by mouth at bedtime      metoprolol succinate (TOPROL XL) 100 MG extended release tablet Take 100 mg by mouth daily      nitroGLYCERIN (NITROSTAT) 0.4 MG SL tablet Place 0.4 mg under the tongue 3 times daily as needed      pantoprazole (PROTONIX) 40 MG tablet Take 40 mg by mouth 2 times daily      Roflumilast (DALIRESP) 500 MCG tablet Take 500 mcg by mouth nightly       No current facility-administered medications for this visit. Facility-Administered Medications Ordered in Other Visits   Medication Dose Route Frequency Provider Last Rate Last Admin    sodium chloride flush 0.9 % injection 10 mL  10 mL IntraVENous PRN General Sav NP-C   10 mL at 11/30/22 1409       OBJECTIVE:  /62 (Site: Right Upper Arm, Position: Sitting, Cuff Size: Medium Adult)   Pulse 86   Temp 98.2 °F (36.8 °C) (Oral)   Resp 16   Ht 5' 5.75\" (1.67 m)   Wt 126 lb 9.6 oz (57.4 kg)   SpO2 93%   BMI 20.59 kg/m²     Physical Exam:  Constitutional: Oriented to person, place, and time. Well-developed and well-nourished. HEENT: Normocephalic and atraumatic. Oropharynx is clear and moist.   Conjunctivae and EOM are normal. Pupils are equal, round, and reactive to light. No scleral icterus. Neck supple. No JVD present. No tracheal deviation present. No thyromegaly present. Lymph node No palpable submandibular, cervical, supraclavicular, axillary and inguinal lymph nodes. Skin Rash. Warm and dry. No bruising noted. No erythema. No pallor. Respiratory Effort normal and breath sounds normal.  No respiratory distress. No wheezes. No rales. No tenderness. CVS Normal rate, regular rhythm and normal heart sounds. Exam reveals no gallop, no friction and no rub. No murmur heard. Abdomen Soft. Bowel sounds are normal. Exhibits no distension. There is no tenderness. There is no rebound and no guarding. Neuro Normal reflexes. No cranial nerve deficit. Exhibits normal muscle tone, 5 of 5 strength of all extremities. MSK Normal range of motion in general.  No edema and no tenderness.    Psych Normal mood, affect, behavior, judgment and thought content      Labs:  Recent Results (from the past 24 hour(s))   Magnesium    Collection Time: 11/30/22  1:59 PM   Result Value Ref Range    Magnesium 2.1 1.8 - 2.4 mg/dL   Comprehensive Metabolic Panel    Collection Time: 11/30/22  1:59 PM   Result Value Ref Range    Sodium 133 133 - 143 mmol/L    Potassium 3.7 3.5 - 5.1 mmol/L    Chloride 95 (L) 101 - 110 mmol/L    CO2 30 21 - 32 mmol/L    Anion Gap 8 2 - 11 mmol/L    Glucose 123 (H) 65 - 100 mg/dL    BUN 19 8 - 23 MG/DL    Creatinine 0.80 0.6 - 1.0 MG/DL    Est, Glom Filt Rate >60 >60 ml/min/1.73m2    Calcium 9.2 8.3 - 10.4 MG/DL    Total Bilirubin 0.3 0.2 - 1.1 MG/DL    ALT 18 12 - 65 U/L    AST 14 (L) 15 - 37 U/L    Alk Phosphatase 59 50 - 136 U/L    Total Protein 7.6 6.3 - 8.2 g/dL    Albumin 3.3 3.2 - 4.6 g/dL    Globulin 4.3 2.8 - 4.5 g/dL    Albumin/Globulin Ratio 0.8 0.4 - 1.6     CBC with Auto Differential    Collection Time: 11/30/22  1:59 PM   Result Value Ref Range    WBC 7.8 4.3 - 11.1 K/uL    RBC 3.14 (L) 4.05 - 5.2 M/uL    Hemoglobin 9.8 (L) 11.7 - 15.4 g/dL    Hematocrit 30.8 (L) 35.8 - 46.3 %    MCV 98.1 82.0 - 102.0 FL    MCH 31.2 26.1 - 32.9 PG    MCHC 31.8 31.4 - 35.0 g/dL    RDW 18.5 (H) 11.9 - 14.6 %    Platelets 707 606 - 260 K/uL    MPV 8.9 (L) 9.4 - 12.3 FL    nRBC 0.00 0.0 - 0.2 K/uL    Differential Type AUTOMATED Seg Neutrophils 76 43 - 78 %    Lymphocytes 5 (L) 13 - 44 %    Monocytes 13 (H) 4.0 - 12.0 %    Eosinophils % 4 0.5 - 7.8 %    Basophils 1 0.0 - 2.0 %    Immature Granulocytes 1 0.0 - 5.0 %    Segs Absolute 6.0 1.7 - 8.2 K/UL    Absolute Lymph # 0.4 (L) 0.5 - 4.6 K/UL    Absolute Mono # 1.1 0.1 - 1.3 K/UL    Absolute Eos # 0.3 0.0 - 0.8 K/UL    Basophils Absolute 0.0 0.0 - 0.2 K/UL    Absolute Immature Granulocyte 0.0 0.0 - 0.5 K/UL       Imaging:  No results found for this or any previous visit. ASSESSMENT/PLAN:   Diagnosis Orders   1. Non-small cell lung cancer, unspecified laterality ([facility] [address]        2. High risk medication use        3. Weight loss        4. Nausea        5. Herpes zoster with other complication                67 y.o. F consulted for lung adenocarcinoma presented to [facility] on 7/26/2022 with . She was a longtime smoker quitted 5 years ago, COPD on home oxygen, recently found left lower lobe 4.4 cm mass and PET showed avid left hilar and subcarinal lymphadenopathy, station 7 lymphadenopathy not accessible on EBUS but biopsy of station 11 L lymph node showed lung adenocarcinoma.   We discussed that this was consistent with stage III lung cancer, discussed potential curative intent therapy with concurrent chemoradiation followed by immunotherapy, all questions answered and patient is interested, we will arrange port placement and radiation oncology evaluation, discussed toxicities and management, patient takes Plavix for severe CAD with multiple coronary stents, no absolute contraindication but would need careful management, return on 9/6/2022 and ready to proceed with cycle 1, monitor bruising and platelet carefully, requested to check thyroid function and reportedly being placed on methimazole for supposed hyperthyroidism when she was hospitalized months ago without any follow-ups, check TSH actually elevated and refer to endocrinology to clarify the management, followed on 10/12/2022, still having significant odynophagia but managing with Magic mouthwash and tramadol and maintain the weight, completed 6 weeks chemoradiation and repeat CT showed good NH, mostly recovering from chemoradiation toxicity, discussed adjuvant durvalumab and motivated to pursue, educated for AE/management, proceed to cycle 1 and okay to change to every 4 week dose with pharmacy, check anemia labs, return in a month to follow for cycle 2 but call as needed. Review/visit/discussion/management over 40 minutes. She called to be working for pain and rash, found shingles of left chest and prescribed valacyclovir therapeutic for a week, refill pain medicine, also having skin rash in the scalp after she dyed her hair pink and I suspect this is contact dermatitis, prescribed cortisone cream and Benadryl gel to use topically, she has been taking prednisone 10 mg daily from pulmonology chronically and she feels need to continue. Return on 11/13/2022 and shingles dried up, prescribe prophylactic acyclovir for 3 months and will get shingleRx vaccine, proceed to cycle 2 durvalumab, Zofran as needed and will determine whether we will add a look in premed depending on how she tolerates this time, discussed weight loss and nutrition, return in 4 weeks but call as needed. All questions are answered to their satisfaction. They will call for further questions and concerns. ECOG PERFORMANCE STATUS - 1- Restricted in physically strenuous activity but ambulatory and able to carry out work of a light or sedentary nature such as light house work, office work. Pain - 5/10. None/Minimal pain - not affecting QOL     Fatigue - No flowsheet data found. Distress - No flowsheet data found. Elements of this note have been dictated via voice recognition software. Text and phrases may be limited by the accuracy and autoconversion of the software. The chart has been reviewed, but errors may still be present.           Alan Hodgson, M.D.  [address], [address] [place] [address]  Office : [phone]  Fax : [phone]

## 2022-11-30 NOTE — PATIENT INSTRUCTIONS
Patient Instructions from Today's Visit    Reason for Visit:  Immunotherapy visit    Plan:  Proceed with treatment  We will continue Acyclovir to prevent shingles recurrence    Follow Up:  Office visit in 4 weeks    Recent Lab Results:  Hospital Outpatient Visit on 11/30/2022   Component Date Value Ref Range Status    Magnesium 11/30/2022 2.1  1.8 - 2.4 mg/dL Final    Sodium 11/30/2022 133  133 - 143 mmol/L Final    Potassium 11/30/2022 3.7  3.5 - 5.1 mmol/L Final    Chloride 11/30/2022 95 (A)  101 - 110 mmol/L Final    CO2 11/30/2022 30  21 - 32 mmol/L Final    Anion Gap 11/30/2022 8  2 - 11 mmol/L Final    Glucose 11/30/2022 123 (A)  65 - 100 mg/dL Final    BUN 11/30/2022 19  8 - 23 MG/DL Final    Creatinine 11/30/2022 0.80  0.6 - 1.0 MG/DL Final    Est, Glom Filt Rate 11/30/2022 >60  >60 ml/min/1.73m2 Final    Comment:      Pediatric calculator link: [employer] [facility].at. org/professionals/kdoqi/gfr_calculatorped       Effective Oct 3, 2022       These results are not intended for use in patients <25years of age. eGFR results are calculated without a race factor using  the 2021 CKD-EPI equation. Careful clinical correlation is recommended, particularly when comparing to results calculated using previous equations. The CKD-EPI equation is less accurate in patients with extremes of muscle mass, extra-renal metabolism of creatinine, excessive creatine ingestion, or following therapy that affects renal tubular secretion.       Calcium 11/30/2022 9.2  8.3 - 10.4 MG/DL Final    Total Bilirubin 11/30/2022 0.3  0.2 - 1.1 MG/DL Final    ALT 11/30/2022 18  12 - 65 U/L Final    AST 11/30/2022 14 (A)  15 - 37 U/L Final    Alk Phosphatase 11/30/2022 59  50 - 136 U/L Final    Total Protein 11/30/2022 7.6  6.3 - 8.2 g/dL Final    Albumin 11/30/2022 3.3  3.2 - 4.6 g/dL Final    Globulin 11/30/2022 4.3  2.8 - 4.5 g/dL Final    Albumin/Globulin Ratio 11/30/2022 0.8  0.4 - 1.6   Final    WBC 11/30/2022 7.8  4.3 - 11.1 K/uL Final    RBC 11/30/2022 3.14 (A)  4.05 - 5.2 M/uL Final    Hemoglobin 11/30/2022 9.8 (A)  11.7 - 15.4 g/dL Final    Hematocrit 11/30/2022 30.8 (A)  35.8 - 46.3 % Final    MCV 11/30/2022 98.1  82.0 - 102.0 FL Final    MCH 11/30/2022 31.2  26.1 - 32.9 PG Final    MCHC 11/30/2022 31.8  31.4 - 35.0 g/dL Final    RDW 11/30/2022 18.5 (A)  11.9 - 14.6 % Final    Platelets 85/78/8327 278  150 - 450 K/uL Final    MPV 11/30/2022 8.9 (A)  9.4 - 12.3 FL Final    nRBC 11/30/2022 0.00  0.0 - 0.2 K/uL Final    **Note: Absolute NRBC parameter is now reported with Hemogram**    Differential Type 11/30/2022 AUTOMATED    Final    Seg Neutrophils 11/30/2022 76  43 - 78 % Final    Lymphocytes 11/30/2022 5 (A)  13 - 44 % Final    Monocytes 11/30/2022 13 (A)  4.0 - 12.0 % Final    Eosinophils % 11/30/2022 4  0.5 - 7.8 % Final    Basophils 11/30/2022 1  0.0 - 2.0 % Final    Immature Granulocytes 11/30/2022 1  0.0 - 5.0 % Final    Segs Absolute 11/30/2022 6.0  1.7 - 8.2 K/UL Final    Absolute Lymph # 11/30/2022 0.4 (A)  0.5 - 4.6 K/UL Final    Absolute Mono # 11/30/2022 1.1  0.1 - 1.3 K/UL Final    Absolute Eos # 11/30/2022 0.3  0.0 - 0.8 K/UL Final    Basophils Absolute 11/30/2022 0.0  0.0 - 0.2 K/UL Final    Absolute Immature Granulocyte 11/30/2022 0.0  0.0 - 0.5 K/UL Final       Treatment Summary has been discussed and given to patient: n/a    -------------------------------------------------------------------------------------------------------------------  Please call our office at [phone] if you have any  of the following symptoms:   Fever of 100.5 or greater  Chills  Shortness of breath  Swelling or pain in one leg    After office hours an answering service is available and will contact a provider for emergencies or if you are experiencing any of the above symptoms. Patient does express an interest in My Chart. My Chart log in information explained on the after visit summary printout at the [facility] [address] desk. Renzo ALFARO Mary Nieto RN  Nurse Navigator  [phone]  For Urgent Matters (after hours and weekends), please call Triage at [phone]. For Emergencies, please call 911.

## 2022-11-30 NOTE — PROGRESS NOTES
Patient arrived to port lab for port access and lab draw   [place] accessed and labs drawn per protocol   *Port remains accessed   Patient discharged from port lab via w/c*

## 2022-11-30 NOTE — PROGRESS NOTES
Patient Name:  Pat Colon      YOB: 1950  MRN: [id number]                       Office Visit 11/30/2022    ASSESSMENT AND PLAN:  (Medical Decision Making)  Pt with very severe COPD with chronic hypoxic respiratory failure. Deemed not a transplant candidate by [facility]- [address] [place] [address] and [facility] Dr Michael Calzada. Dx with stage III lung cancer in 2022. Completed chemo and radiation, on immunotherapy now. Doing relatively well, though still very debilitated. -will try to get her back into pulmonary rehab to complete another cycle.   -cont symbicort, spiriva, daliresp, prn albuterol.   -cont 2-3L O2. Has oximeter at home and will monitor.   -recent CT scan with improvement in areas of cancer. Cont immunotherapy. Discussed possible pneumonitis as a side effect. Monitoring for signs of this. F/u in 3 months. Amado Sanchez was seen today for copd and lung cancer. Diagnoses and all orders for this visit:    COPD, very severe ([address]  -     Ambulatory referral to Pulmonary Rehab    Chronic respiratory failure with hypoxia ([address]    Personal history of tobacco use, presenting hazards to health    Non-small cell lung cancer, unspecified laterality ([facility]      Kalin Stone MD    Total time for encounter on day of encounter was 20 minutes. This time includes chart prep, review of tests/procedures, review of other provider's notes, documentation and counseling patient regarding disease process and medications. _________________________________________________________________________    HISTORY OF PRESENT ILLNESS:    Ms. Shelda Dakin in our clinic today who presents with a COPD and Lung Cancer   She has a history of very severe COPD with centrilobular emphysema, on nocturnal O2. She had a navigation bronchoscopy with attempts at biopsy of a JHONY nodule 2/11/20 which was difficult to visualize with radial probe and ultimately was non diagnostic. PET scan showed this area to have an SUV of 2.1.   She was to have a bronchoscopy attempt at [facility] in July but repeat imaging performed here showed improvement in this area so the bronchoscopy has been cancelled. Was referred to [facility] for eval for lung transplant ultimately felt not to be a lung transplant candidate. Was then seen by Dr Lise Joiner with [address] and also felt not to be a transplant candidate. June 2022 she had a CT scan with increase LLL mass and L hilar lymph node. She had bronch with EBUS and nic showing adenocarcinoma in 11R. Stage III. She was seen by heme/onc. She has completed chemo/radiation and states that she tolerated this well. Here for follow up appt. Since her last appt she states she had her first immunotherapy about 2 weeks ago. She developed shingles 11/21 and is on treatment for this. She states that her breathing is slightly worse than before starting cancer treatment. Using 2L O2 at rest, 3L with exertion. Still using symbicort, daliresp, spiriva, albuterol. Having to use albuterol < 1 time a day. She has been seen by palliative care. REVIEW OF SYSTEMS: 10 point review of systems is negative except as reported in HPI. PHYSICAL EXAM: Body mass index is 21.3 kg/m². Vitals:    11/30/22 0910   BP: 120/80   Pulse: 81   Resp: 20   Temp: 98 °F (36.7 °C)   TempSrc: Temporal   SpO2: 95%   Weight: 130 lb (59 kg)   Height: 5' 5.5\" (1.664 m)     PERTINENT FINDINGS:   NAD, sitting up in wheelchair. Lungs are CTA B, no w/r/r  RRR, no m/r/g. Trace B LE edema. DIAGNOSTIC TESTS:                                                                                    LABS:   Lab Results   Component Value Date/Time    WBC 5.7 11/21/2022 11:15 AM    HGB 9.8 11/21/2022 11:15 AM    HCT 30.8 11/21/2022 11:15 AM     11/21/2022 11:15 AM    TSH 0.017 03/16/2022 12:15 PM    NTPROBNP 3,942 06/09/2022 06:00 AM     Imaging:  CXR:   XR CHEST PORTABLE 06/09/2022    Narrative  EXAM: Chest x-ray. INDICATION: Dyspnea.     COMPARISON: Prior CT chest on February 26, 2022. TECHNIQUE: 2 frontal views of the chest were obtained. FINDINGS: The lungs are hyperexpanded, consistent with emphysematous changes on  the prior CT chest. Areas of nodular consolidation in the left lower lobe are  unchanged. No acute infiltrate or pulmonary edema is seen. The heart size,  mediastinal contour and pulmonary vasculature are normal. There are coronary  artery calcifications or stents. The thorax or pleural effusion is seen. Impression  1. Emphysema. 2. Coronary artery disease. 3. Unchanged nodular consolidation in the left lower lobe. CT Chest:   CT CHEST ABDOMEN PELVIS W CONTRAST 11/16/2022    Narrative  EXAMINATION: CT CHEST ABDOMEN PELVIS W CONTRAST 11/16/2022 11:19 AM    ACCESSION NUMBER: [id number]    COMPARISON: PET/CT June 24, 2022. CT chest June 11, 2022    INDICATION: Malignant neoplasm of unspecified part of unspecified bronchus or  lung    TECHNIQUE: Multiple contiguous axial CT images of the chest, abdomen, and pelvis  were obtained from the lung apices to the symphysis pubis after the intravenous  administration of 100 mL Isovue 370. Reformats are provided. Radiation dose reduction techniques were used for this study. Our CT scanners  use one or all of the following: Automated exposure control, adjustment of the  mA and/or kV according to patient size, iterative reconstruction. FINDINGS:  CHEST:  Base of Neck/Chest Wall: Right chest Chemo-Port catheter with distal tip  terminating at the right atrium. Mediastinum: Interval resolution of previous 1.4 cm left hilar lymph node. No  new or enlarging adenopathy. The heart is normal in size. No pericardial  effusion. Scattered heavy coronary artery calcifications. Scattered  atherosclerotic calcifications of the aortic arch and its major branch vessels. The thoracic aorta is normal caliber. No central pulmonary artery filling  defect.     Airways/Lungs/Pleura: Extensive emphysematous changes with biapical parenchymal  scarring and architectural distortion. Overall decrease size and consolidation  of a ill-defined left lower lobe/perihilar mass with some adjacent surrounding  groundglass attenuation, measuring up to 1.5 x 2.8 cm in its largest axial  dimensions (series 2 image 34). Similar minimal groundglass and architectural  distortion about the posterior left lower lobe. No new pulmonary nodule,  groundglass attenuation, or mass lesion. No pleural fluid collections or pneumothorax. ABDOMEN AND PELVIS:  Liver: Subcentimeter hypodensity too small for accurate characterization within  segment VI, statistically favoring a benign process. Gallbladder/Biliary: Unremarkable. No biliary ductal dilatation. Pancreas: Unremarkable. No main ductal dilatation. Spleen: Unremarkable. Adrenal glands: Unremarkable. Kidneys/Ureters: Symmetric bilateral enhancement. No hydronephrosis or  suspicious lesion. Bladder: Partially distended bladder is unremarkable. Reproduction:Unremarkable. Bowel: Normal caliber small and large bowel without wall thickening or  inflammatory changes. The appendix is normal.    Mesentery/Retroperitoneum/Peritoneum: No enlarged mesentery or retroperitoneal  adenopathy. No peritoneal thickening or ascites. Vasculature: Heavy aortobiiliac atherosclerosis without aneurysmal dilatation. Musculoskeletal: Diffuse osteopenia with multilevel degenerative changes of the  thoracolumbar spine. No acute or aggressive osseous abnormality. Postsurgical  changes from open reduction and internal fixation of a right proximal femur  fracture. Impression  1. Findings of positive treatment response with resolution of previous 1.4 cm  left hilar lymph node and decrease size and soft tissue component of a now 1.5 x  2.8 cm left lower lobe/perihilar lesion. 2.  No evidence of metastatic disease to the abdomen or pelvis.     11/16/22      Nuclear Medicine:   PET CT SKULL BASE TO MID THIGH 06/24/2022    Narrative  PET/CT: 6/24/2022    INDICATION: Initial diagnosis of lung mass. TECHNIQUE: After oral administration of gastroview and intravenous  administration of 12.28 mCi of F18 FDG, noncontrast CT images were obtained for  attenuation correction and for fusion with emission PET images. A series of  overlapping emission PET images were then obtained beginning 60 minutes after  injection of FDG. The area imaged spanned the region from the skull base to the  mid thighs. All CT scans performed at this facility use one or all of the  following: Automated exposure control, adjustment of the mA and/or kVp according  to patient's size, iterative reconstruction. COMPARISON: CT chest 6/11/2022    FINDINGS:  NECK/CHEST:  No suspicious activity is seen in the neck. Only minimal nonfocal activity is  seen about left posterior facets in the mid cervical spine likely related to  benign degenerative changes. No clear aggressive osseous lesion is suggested on  limited CT images. The most clear suspicious activity is seen associated with a large heterogeneous  lesion in the left lower lobe demonstrating spiculated appearing components best  appreciated on axial image 92 measuring 4.4 cm x 2.3 cm in greatest transverse  dimensions and with a maximum SUV of 11.2 g/mL. Malignancy is not excluded given  the appearance. Additional focal appearing activity is seen in the left hilum on  pet/CT image 85 with a maximum SUV of 9.1 g/mL and in the subcarinal mediastinum  on axial image 84 with a maximum SUV of 6.3 g/mL concerning for left hilar and  subcarinal mediastinal diego metastases, respectively. Additional mild activity  is seen in the mediastinum and left hilum on pet/CT image 76 which does appear  to correspond to small prevascular and left hilar lymph nodes seen on axial CT  image 39 of the prior the chest dated 6/11/2022 which are suspicious for  additional early diego metastases.  No clearly suspicious activity is otherwise  seen in the chest. Mild to moderate activity is seen in the medial right upper  lobe on pet/CT image 56 with a maximum SUV of 4.8 g/mL. Although the degree of  activity is suspicious, this appears to correspond to a fluid containing cystic  structure in the right upper lobe with a CT appearance being more suggestive of  an inflammatory process. Additional soft tissue activity is seen over the  lateral scapula which is most consistent with benign muscle activity. ABDOMEN/PELVIS:  The only potentially suspicious activity is skin activity involving the left  proximal medial thigh seen on pet/CT image 257 with a maximum SUV of 6.3 g/mL  which appears to be associated with focal skin thickening. This is not felt to  be related to the patient's lung cancer. However, a dermal neoplasm or focal  inflammatory process is not excluded given the appearance. This should be  further assessed with direct visualization. No suspicious activity is otherwise  seen below the diaphragms. Activity is seen adjacent to the right femoral neck. However, this appears to correspond to heterotopic bone formation from hip  surgery at this level without aggressive features suggested therefore is not  worrisome. No focal hepatic activity is seen. No contour deforming, or  hypermetabolic adrenal lesion is seen. Advanced atherosclerotic calcification is  seen of the abdominal aorta. Impression  1. Heterogeneous left lower lobe mass demonstrating irregular spiculated  components and measuring 4.4 cm x 2.3 cm in greatest transverse dimensions and  with a maximum SUV of 11.2 g/mL. Malignancy is not excluded given the  appearance. Further evaluation as clinically indicated is recommended. 2. Additional left hilar and subcarinal mediastinal activity concerning for  diego metastases.  Additional mild activity is seen within a prevascular  mediastinal lymph node an additional left hilar lymph nodes for which additional  early diego metastases are not excluded. 3. Focal activity associated with focal skin thickening in the most medial,  proximal left thigh seen on pet/CT image 257. This is unlikely to be related to  potential lung cancer although could represent an additional dermal neoplasm or  focal abnormal inflammatory process otherwise. This should be clinically  evaluated with direct visualization. This has been marked on image to assist  with clinical correlation. 4. Mild activity associated with a cystic abnormality in the medial right upper  lobe. Although the degree of activity is suspicious (maximum SUV of 4.8 g/mL)  the CT features are more consistent with a potential inflammatory process. Attention on follow-up studies is recommended. PFTs:   No flowsheet data found. No results found for this or any previous visit. No results found for this or any previous visit. FeNO: No results found for this or any previous visit. Exercise Oximetry: No results found for this or any previous visit. Echo:   TRANSTHORACIC ECHOCARDIOGRAM (TTE) COMPLETE (CONTRAST/BUBBLE/3D PRN) 02/06/2022    Narrative  This is a summary report. The complete report is available in the patient's medical record. If you cannot access the medical record, please contact the sending organization for a detailed fax or copy. Left Ventricle: Left ventricle size is normal. Normal wall thickness. Normal wall motion. Low normal left ventricular systolic function. EF by 2D Simpsons Biplane is 52%. Normal diastolic function. Aortic Valve: Mildly thickened cusps. Mitral Valve: Mildly thickened leaflets. Mildly calcified anterior leaflet. Moderate to severe transvalvular regurgitation with an eccentrically directed jet and may underestimate severity. Transesophageal echo  recommended for further evaluation of potentially severe mitral regurgitation. Aorta: Dilated annulus.     [facility] Reference Info: Past Medical History:   Diagnosis Date    Asthma     Closed right hip fracture ([facility] 02/2022    Coronary artery disease     Former cigarette smoker     [facility] 02/2022    Hyperthyroidism     Lung mass     Multiple closed anterior-posterior compression fractures of pelvis ([facility] 02/2022    Non-small cell lung cancer ([facility]     Severe chronic obstructive pulmonary disease (HCC)     Subdural hematoma 03/2022    Thyroid nodule       Tobacco Use: Medium Risk    Smoking Tobacco Use: Former    Smokeless Tobacco Use: Never    Passive Exposure: Not on file     Allergies   Allergen Reactions    Promethazine Nausea And Vomiting and Other (See Comments)     Severe vomiting      Current Outpatient Medications   Medication Instructions    acetaminophen (TYLENOL) 325 MG tablet Oral, EVERY 4 HOURS PRN    albuterol (PROVENTIL) (2.5 MG/3ML) 0.083% nebulizer solution TAKE 3 ML BY NEBULIZATION EVERY 6 HOURS AS NEEDED FOR SHORTNESS OF BREATH    aspirin 81 mg, Oral, DAILY    atorvastatin (LIPITOR) 80 mg, Oral, Nightly, TAKE 1 TABLET BY MOUTH DAILY    azithromycin (ZITHROMAX) 500 MG tablet Oral    budesonide-formoterol (SYMBICORT) 160-4.5 MCG/ACT AERO 2 puffs, Inhalation, 2 TIMES DAILY    cetirizine (ZYRTEC) 10 mg, Oral, DAILY PRN    clopidogrel (PLAVIX) 75 mg, Oral, DAILY    escitalopram (LEXAPRO) 10 mg, Oral, DAILY    ezetimibe (ZETIA) 10 mg, Oral, Nightly    gabapentin (NEURONTIN) 300 mg, Oral, 3 TIMES DAILY    HYDROcodone-acetaminophen (NORCO) 5-325 MG per tablet 2 tablets, Oral, EVERY 6 HOURS PRN, Intended supply: 3 days. Take lowest dose possible to manage pain    ketorolac (TORADOL) 10 mg, Oral, EVERY 6 HOURS PRN    lidocaine-prilocaine (EMLA) 2.5-2.5 % cream Apply topically as needed.     Magic Mouthwash (MIRACLE MOUTHWASH) 5 mLs, Swish & Swallow, 4 TIMES DAILY PRN, Equal parts viscous lidocaine, maalox, nystatin, benadryl methIMAzole (TAPAZOLE) 5 mg, Oral, DAILY    metoprolol succinate (TOPROL XL) 100 mg, Oral, DAILY    nitroGLYCERIN (NITROSTAT) 0.4 mg, SubLINGual, 3 TIMES DAILY PRN    ondansetron (ZOFRAN ODT) 4 mg, Oral, EVERY 8 HOURS PRN    ondansetron (ZOFRAN) 8 mg, Oral, EVERY 8 HOURS PRN    OXYGEN Inhalation, 2 LPM    pantoprazole (PROTONIX) 40 mg, Oral, 2 TIMES DAILY    predniSONE (DELTASONE) 5 MG tablet     predniSONE (DELTASONE) 10 mg, Oral, DAILY WITH BREAKFAST    prochlorperazine (COMPAZINE) 10 mg, Oral, EVERY 6 HOURS PRN    Pseudoephedrine-guaiFENesin (MUCINEX D PO) Oral, 2 times daily    Roflumilast (DALIRESP) 500 mcg, Oral, NIGHTLY    tiotropium (SPIRIVA RESPIMAT) 2.5 MCG/ACT AERS inhaler 2 puffs, Inhalation, DAILY    torsemide (DEMADEX) 10 mg, Oral, DAILY    traMADol (ULTRAM) 100 mg, Oral, 4 TIMES DAILY, Z51.5 Palliative Care

## 2022-12-01 ENCOUNTER — CLINICAL DOCUMENTATION (OUTPATIENT)
Dept: CASE MANAGEMENT | Age: 72
End: 2022-12-01

## 2022-12-01 ENCOUNTER — HOSPITAL ENCOUNTER (OUTPATIENT)
Dept: INFUSION THERAPY | Age: 72
Discharge: HOME | End: 2022-12-01
Payer: MEDICARE

## 2022-12-01 VITALS
RESPIRATION RATE: 18 BRPM | TEMPERATURE: 99.2 F | OXYGEN SATURATION: 94 % | HEART RATE: 92 BPM | SYSTOLIC BLOOD PRESSURE: 91 MMHG | DIASTOLIC BLOOD PRESSURE: 53 MMHG

## 2022-12-01 DIAGNOSIS — C34.90 NON-SMALL CELL LUNG CANCER, UNSPECIFIED LATERALITY (HCC): Primary | ICD-10-CM

## 2022-12-01 PROCEDURE — 2580000003 HC RX 258: Performed by: NURSE PRACTITIONER

## 2022-12-01 PROCEDURE — 6360000002 HC RX W HCPCS: Performed by: NURSE PRACTITIONER

## 2022-12-01 PROCEDURE — 96413 CHEMO IV INFUSION 1 HR: CPT

## 2022-12-01 RX ORDER — ACETAMINOPHEN 325 MG/1
650 TABLET ORAL
Status: CANCELLED | OUTPATIENT
Start: 2022-12-01

## 2022-12-01 RX ORDER — DIPHENHYDRAMINE HYDROCHLORIDE 50 MG/ML
50 INJECTION INTRAMUSCULAR; INTRAVENOUS
Status: CANCELLED | OUTPATIENT
Start: 2022-12-01

## 2022-12-01 RX ORDER — SODIUM CHLORIDE 0.9 % (FLUSH) 0.9 %
5-40 SYRINGE (ML) INJECTION PRN
Status: DISCONTINUED | OUTPATIENT
Start: 2022-12-01 | End: 2022-12-02 | Stop reason: HOSPADM

## 2022-12-01 RX ORDER — HEPARIN SODIUM (PORCINE) LOCK FLUSH IV SOLN 100 UNIT/ML 100 UNIT/ML
500 SOLUTION INTRAVENOUS PRN
Status: CANCELLED | OUTPATIENT
Start: 2022-12-01

## 2022-12-01 RX ORDER — SODIUM CHLORIDE 9 MG/ML
5-40 INJECTION INTRAVENOUS PRN
Status: CANCELLED | OUTPATIENT
Start: 2022-12-01

## 2022-12-01 RX ORDER — MEPERIDINE HYDROCHLORIDE 25 MG/ML
12.5 INJECTION INTRAMUSCULAR; INTRAVENOUS; SUBCUTANEOUS PRN
Status: CANCELLED | OUTPATIENT
Start: 2022-12-01

## 2022-12-01 RX ORDER — SODIUM CHLORIDE 9 MG/ML
5-250 INJECTION, SOLUTION INTRAVENOUS PRN
Status: DISCONTINUED | OUTPATIENT
Start: 2022-12-01 | End: 2022-12-02 | Stop reason: HOSPADM

## 2022-12-01 RX ORDER — ONDANSETRON 2 MG/ML
8 INJECTION INTRAMUSCULAR; INTRAVENOUS
Status: CANCELLED | OUTPATIENT
Start: 2022-12-01

## 2022-12-01 RX ORDER — ALBUTEROL SULFATE 90 UG/1
4 AEROSOL, METERED RESPIRATORY (INHALATION) PRN
Status: CANCELLED | OUTPATIENT
Start: 2022-12-01

## 2022-12-01 RX ORDER — SODIUM CHLORIDE 9 MG/ML
5-250 INJECTION, SOLUTION INTRAVENOUS PRN
Status: CANCELLED | OUTPATIENT
Start: 2022-12-01

## 2022-12-01 RX ORDER — SODIUM CHLORIDE 9 MG/ML
INJECTION, SOLUTION INTRAVENOUS CONTINUOUS
Status: CANCELLED | OUTPATIENT
Start: 2022-12-01

## 2022-12-01 RX ORDER — EPINEPHRINE 1 MG/ML
0.3 INJECTION, SOLUTION, CONCENTRATE INTRAVENOUS PRN
Status: CANCELLED | OUTPATIENT
Start: 2022-12-01

## 2022-12-01 RX ADMIN — SODIUM CHLORIDE, PRESERVATIVE FREE 10 ML: 5 INJECTION INTRAVENOUS at 07:23

## 2022-12-01 RX ADMIN — SODIUM CHLORIDE 100 ML/HR: 9 INJECTION, SOLUTION INTRAVENOUS at 07:30

## 2022-12-01 RX ADMIN — SODIUM CHLORIDE 1500 MG: 9 INJECTION, SOLUTION INTRAVENOUS at 08:50

## 2022-12-01 NOTE — PROGRESS NOTES
Arrived to the [facility]. Zulayfinzi completed. Patient tolerated well. Any issues or concerns during appointment: none. Patient aware of next infusion appointment on 12/28/2022 (date) at  (time). Patient aware of next lab and [facility] office visit on 12/28/2022 (date) at [phone] (time). Patient instructed to call provider with temperature of 100.4 or greater or nausea/vomiting/ diarrhea or pain not controlled by medications  Discharged via wheelchair.
Oksana Ferrell R.N.[facility]

## 2022-12-01 NOTE — PROGRESS NOTES
Patient phoned with low grade fever. Patient denies nausea, pain, diarrhea, cough, congestion. Advised patient to call back if fever increases or if she develops any symptoms. Patient is currently on cycle 2 of Immunotherapy. Encouraged to call with questions. Navigation will continue to follow.

## 2022-12-09 ENCOUNTER — HOSPITAL ENCOUNTER (EMERGENCY)
Age: 72
Discharge: HOME | DRG: 189 | End: 2022-12-09
Attending: EMERGENCY MEDICINE
Payer: MEDICARE

## 2022-12-09 ENCOUNTER — APPOINTMENT (OUTPATIENT)
Dept: GENERAL RADIOLOGY | Age: 72
DRG: 189 | End: 2022-12-09
Payer: MEDICARE

## 2022-12-09 ENCOUNTER — HOSPITAL ENCOUNTER (INPATIENT)
Age: 72
LOS: 10 days | Discharge: NURSING, REHAB OR LONG-TERM CARE | DRG: 189 | End: 2022-12-21
Attending: INTERNAL MEDICINE | Admitting: INTERNAL MEDICINE
Payer: MEDICARE

## 2022-12-09 ENCOUNTER — APPOINTMENT (OUTPATIENT)
Dept: CT IMAGING | Age: 72
DRG: 189 | End: 2022-12-09
Payer: MEDICARE

## 2022-12-09 ENCOUNTER — CLINICAL DOCUMENTATION (OUTPATIENT)
Dept: ONCOLOGY | Age: 72
End: 2022-12-09

## 2022-12-09 VITALS
BODY MASS INDEX: 21.66 KG/M2 | OXYGEN SATURATION: 100 % | WEIGHT: 130 LBS | SYSTOLIC BLOOD PRESSURE: 92 MMHG | RESPIRATION RATE: 12 BRPM | HEART RATE: 66 BPM | TEMPERATURE: 98.1 F | HEIGHT: 65 IN | DIASTOLIC BLOOD PRESSURE: 55 MMHG

## 2022-12-09 DIAGNOSIS — R06.02 SHORTNESS OF BREATH: Primary | ICD-10-CM

## 2022-12-09 DIAGNOSIS — R94.31 ABNORMAL EKG: ICD-10-CM

## 2022-12-09 DIAGNOSIS — R07.9 CHEST PAIN, UNSPECIFIED TYPE: Primary | ICD-10-CM

## 2022-12-09 DIAGNOSIS — I25.9 ISCHEMIC HEART DISEASE: ICD-10-CM

## 2022-12-09 PROBLEM — F32.A DEPRESSION: Chronic | Status: ACTIVE | Noted: 2021-05-06

## 2022-12-09 PROBLEM — R06.00 DYSPNEA: Status: ACTIVE | Noted: 2022-01-01

## 2022-12-09 PROBLEM — F41.9 CHRONIC ANXIETY: Chronic | Status: ACTIVE | Noted: 2017-04-27

## 2022-12-09 PROBLEM — D50.0 IRON DEFICIENCY ANEMIA DUE TO CHRONIC BLOOD LOSS: Chronic | Status: ACTIVE | Noted: 2022-01-01

## 2022-12-09 PROBLEM — C34.90 NON-SMALL CELL LUNG CANCER (HCC): Chronic | Status: ACTIVE | Noted: 2022-01-01

## 2022-12-09 PROBLEM — B02.9 SHINGLES: Status: ACTIVE | Noted: 2022-01-01

## 2022-12-09 PROBLEM — E05.90 HYPERTHYROIDISM: Chronic | Status: ACTIVE | Noted: 2022-01-01

## 2022-12-09 PROBLEM — F41.9 CHRONIC ANXIETY: Status: ACTIVE | Noted: 2017-04-27

## 2022-12-09 PROBLEM — F32.A DEPRESSION: Status: ACTIVE | Noted: 2021-05-06

## 2022-12-09 PROBLEM — I34.0 MODERATE TO SEVERE MITRAL REGURGITATION: Chronic | Status: ACTIVE | Noted: 2022-01-01

## 2022-12-09 PROBLEM — J96.11 CHRONIC RESPIRATORY FAILURE WITH HYPOXIA (HCC): Chronic | Status: ACTIVE | Noted: 2019-06-08

## 2022-12-09 PROBLEM — I34.0 MODERATE TO SEVERE MITRAL REGURGITATION: Status: ACTIVE | Noted: 2022-01-01

## 2022-12-09 LAB
ALBUMIN SERPL-MCNC: 3.4 G/DL (ref 3.2–4.6)
ALBUMIN/GLOB SERPL: 1 {RATIO} (ref 0.4–1.6)
ALP SERPL-CCNC: 56 U/L (ref 45–117)
ALT SERPL-CCNC: 5 U/L (ref 13–61)
ANION GAP SERPL CALC-SCNC: 15 MMOL/L (ref 2–11)
APTT PPP: 31.2 SEC (ref 24.5–34.2)
AST SERPL-CCNC: 18 U/L (ref 15–37)
BASOPHILS # BLD: 0 K/UL (ref 0–0.2)
BASOPHILS NFR BLD: 1 % (ref 0–2)
BILIRUB SERPL-MCNC: 0.4 MG/DL (ref 0.2–1.1)
BUN SERPL-MCNC: 11 MG/DL (ref 7–18)
CALCIUM SERPL-MCNC: 9.2 MG/DL (ref 8.3–10.4)
CHLORIDE SERPL-SCNC: 95 MMOL/L (ref 98–107)
CO2 SERPL-SCNC: 24 MMOL/L (ref 21–32)
CREAT SERPL-MCNC: 0.56 MG/DL (ref 0.6–1)
DIFFERENTIAL METHOD BLD: ABNORMAL
EOSINOPHIL # BLD: 0.1 K/UL (ref 0–0.8)
EOSINOPHIL NFR BLD: 1 % (ref 0.5–7.8)
ERYTHROCYTE [DISTWIDTH] IN BLOOD BY AUTOMATED COUNT: 17.6 % (ref 11.9–14.6)
FLUAV AG NPH QL IA: NEGATIVE
FLUBV AG NPH QL IA: NEGATIVE
GLOBULIN SER CALC-MCNC: 3.4 G/DL (ref 2.8–4.5)
GLUCOSE SERPL-MCNC: 126 MG/DL (ref 65–100)
HCT VFR BLD AUTO: 27 % (ref 35.8–46.3)
HCT VFR BLD AUTO: 27.4 % (ref 35.8–46.3)
HGB BLD-MCNC: 8.8 G/DL (ref 11.7–15.4)
HGB BLD-MCNC: 8.8 G/DL (ref 11.7–15.4)
IMM GRANULOCYTES # BLD AUTO: 0 K/UL (ref 0–0.5)
IMM GRANULOCYTES NFR BLD AUTO: 1 % (ref 0–5)
LACTATE SERPL-SCNC: 1.3 MMOL/L (ref 0.4–2)
LACTATE SERPL-SCNC: 1.7 MMOL/L (ref 0.4–2)
LYMPHOCYTES # BLD: 0.3 K/UL (ref 0.5–4.6)
LYMPHOCYTES NFR BLD: 5 % (ref 13–44)
MCH RBC QN AUTO: 31.9 PG (ref 26.1–32.9)
MCHC RBC AUTO-ENTMCNC: 32.6 G/DL (ref 31.4–35)
MCV RBC AUTO: 97.8 FL (ref 82–102)
MONOCYTES # BLD: 0.6 K/UL (ref 0.1–1.3)
MONOCYTES NFR BLD: 10 % (ref 4–12)
NEUTS SEG # BLD: 5.5 K/UL (ref 1.7–8.2)
NEUTS SEG NFR BLD: 84 % (ref 43–78)
NRBC # BLD: 0 K/UL (ref 0–0.2)
PLATELET # BLD AUTO: 237 K/UL (ref 150–450)
PMV BLD AUTO: 9.2 FL (ref 9.4–12.3)
POTASSIUM SERPL-SCNC: 3.3 MMOL/L (ref 3.5–5.1)
PROCALCITONIN SERPL-MCNC: 0.15 NG/ML (ref 0–0.49)
PROT SERPL-MCNC: 6.8 G/DL (ref 6.4–8.2)
RBC # BLD AUTO: 2.76 M/UL (ref 4.05–5.2)
SARS-COV-2 RDRP RESP QL NAA+PROBE: NOT DETECTED
SODIUM SERPL-SCNC: 134 MMOL/L (ref 133–143)
SOURCE: NORMAL
SPECIMEN SOURCE: NORMAL
TROPONIN I SERPL HS-MCNC: 113.1 PG/ML (ref 0–14)
TROPONIN T SERPL HS-MCNC: 28.9 NG/L (ref 0–14)
TROPONIN T SERPL HS-MCNC: 30.1 NG/L (ref 0–14)
WBC # BLD AUTO: 6.5 K/UL (ref 4.3–11.1)

## 2022-12-09 PROCEDURE — 87040 BLOOD CULTURE FOR BACTERIA: CPT

## 2022-12-09 PROCEDURE — 93005 ELECTROCARDIOGRAM TRACING: CPT | Performed by: NURSE PRACTITIONER

## 2022-12-09 PROCEDURE — 2580000003 HC RX 258: Performed by: NURSE PRACTITIONER

## 2022-12-09 PROCEDURE — 85730 THROMBOPLASTIN TIME PARTIAL: CPT

## 2022-12-09 PROCEDURE — 6370000000 HC RX 637 (ALT 250 FOR IP): Performed by: PHYSICIAN ASSISTANT

## 2022-12-09 PROCEDURE — 80053 COMPREHEN METABOLIC PANEL: CPT

## 2022-12-09 PROCEDURE — 6370000000 HC RX 637 (ALT 250 FOR IP): Performed by: NURSE PRACTITIONER

## 2022-12-09 PROCEDURE — 94760 N-INVAS EAR/PLS OXIMETRY 1: CPT

## 2022-12-09 PROCEDURE — 2580000003 HC RX 258: Performed by: EMERGENCY MEDICINE

## 2022-12-09 PROCEDURE — 6360000002 HC RX W HCPCS: Performed by: NURSE PRACTITIONER

## 2022-12-09 PROCEDURE — 94640 AIRWAY INHALATION TREATMENT: CPT

## 2022-12-09 PROCEDURE — 2700000000 HC OXYGEN THERAPY PER DAY

## 2022-12-09 PROCEDURE — 84484 ASSAY OF TROPONIN QUANT: CPT

## 2022-12-09 PROCEDURE — 2500000003 HC RX 250 WO HCPCS: Performed by: EMERGENCY MEDICINE

## 2022-12-09 PROCEDURE — 71046 X-RAY EXAM CHEST 2 VIEWS: CPT

## 2022-12-09 PROCEDURE — G0379 DIRECT REFER HOSPITAL OBSERV: HCPCS

## 2022-12-09 PROCEDURE — 85025 COMPLETE CBC W/AUTO DIFF WBC: CPT

## 2022-12-09 PROCEDURE — 87804 INFLUENZA ASSAY W/OPTIC: CPT

## 2022-12-09 PROCEDURE — 83605 ASSAY OF LACTIC ACID: CPT

## 2022-12-09 PROCEDURE — 85018 HEMOGLOBIN: CPT

## 2022-12-09 PROCEDURE — 6360000002 HC RX W HCPCS: Performed by: PHYSICIAN ASSISTANT

## 2022-12-09 PROCEDURE — 87635 SARS-COV-2 COVID-19 AMP PRB: CPT

## 2022-12-09 PROCEDURE — 6360000002 HC RX W HCPCS: Performed by: EMERGENCY MEDICINE

## 2022-12-09 PROCEDURE — 84145 PROCALCITONIN (PCT): CPT

## 2022-12-09 PROCEDURE — 6360000004 HC RX CONTRAST MEDICATION: Performed by: EMERGENCY MEDICINE

## 2022-12-09 PROCEDURE — G0378 HOSPITAL OBSERVATION PER HR: HCPCS

## 2022-12-09 PROCEDURE — 6370000000 HC RX 637 (ALT 250 FOR IP): Performed by: EMERGENCY MEDICINE

## 2022-12-09 PROCEDURE — 99223 1ST HOSP IP/OBS HIGH 75: CPT | Performed by: INTERNAL MEDICINE

## 2022-12-09 PROCEDURE — 71260 CT THORAX DX C+: CPT | Performed by: EMERGENCY MEDICINE

## 2022-12-09 RX ORDER — SODIUM CHLORIDE 0.9 % (FLUSH) 0.9 %
5-40 SYRINGE (ML) INJECTION PRN
Status: DISCONTINUED | OUTPATIENT
Start: 2022-12-09 | End: 2022-12-21 | Stop reason: HOSPADM

## 2022-12-09 RX ORDER — ROFLUMILAST 500 UG/1
500 TABLET ORAL NIGHTLY
Status: DISCONTINUED | OUTPATIENT
Start: 2022-12-09 | End: 2022-12-21 | Stop reason: HOSPADM

## 2022-12-09 RX ORDER — HYDROCODONE BITARTRATE AND ACETAMINOPHEN 5; 325 MG/1; MG/1
2 TABLET ORAL EVERY 6 HOURS PRN
Status: DISCONTINUED | OUTPATIENT
Start: 2022-12-09 | End: 2022-12-21 | Stop reason: HOSPADM

## 2022-12-09 RX ORDER — HEPARIN SODIUM 1000 [USP'U]/ML
30 INJECTION, SOLUTION INTRAVENOUS; SUBCUTANEOUS PRN
Status: DISCONTINUED | OUTPATIENT
Start: 2022-12-09 | End: 2022-12-16 | Stop reason: ALTCHOICE

## 2022-12-09 RX ORDER — ONDANSETRON 4 MG/1
4 TABLET, ORALLY DISINTEGRATING ORAL EVERY 8 HOURS PRN
Status: DISCONTINUED | OUTPATIENT
Start: 2022-12-09 | End: 2022-12-21 | Stop reason: HOSPADM

## 2022-12-09 RX ORDER — ESCITALOPRAM OXALATE 10 MG/1
10 TABLET ORAL DAILY
Status: DISCONTINUED | OUTPATIENT
Start: 2022-12-09 | End: 2022-12-21 | Stop reason: HOSPADM

## 2022-12-09 RX ORDER — GUAIFENESIN 600 MG/1
600 TABLET, EXTENDED RELEASE ORAL 2 TIMES DAILY
Status: DISCONTINUED | OUTPATIENT
Start: 2022-12-09 | End: 2022-12-15

## 2022-12-09 RX ORDER — ASPIRIN 81 MG/1
324 TABLET, CHEWABLE ORAL DAILY
Status: DISCONTINUED | OUTPATIENT
Start: 2022-12-09 | End: 2022-12-09 | Stop reason: HOSPADM

## 2022-12-09 RX ORDER — PREDNISONE 1 MG/1
10 TABLET ORAL
Status: DISCONTINUED | OUTPATIENT
Start: 2022-12-10 | End: 2022-12-10

## 2022-12-09 RX ORDER — SODIUM CHLORIDE 0.9 % (FLUSH) 0.9 %
5-40 SYRINGE (ML) INJECTION EVERY 12 HOURS SCHEDULED
Status: DISCONTINUED | OUTPATIENT
Start: 2022-12-09 | End: 2022-12-21 | Stop reason: HOSPADM

## 2022-12-09 RX ORDER — METHIMAZOLE 5 MG/1
5 TABLET ORAL DAILY
Status: DISCONTINUED | OUTPATIENT
Start: 2022-12-09 | End: 2022-12-21 | Stop reason: HOSPADM

## 2022-12-09 RX ORDER — HEPARIN SODIUM 10000 [USP'U]/100ML
5-30 INJECTION, SOLUTION INTRAVENOUS CONTINUOUS
Status: DISCONTINUED | OUTPATIENT
Start: 2022-12-09 | End: 2022-12-16 | Stop reason: ALTCHOICE

## 2022-12-09 RX ORDER — MORPHINE SULFATE 4 MG/ML
4 INJECTION, SOLUTION INTRAMUSCULAR; INTRAVENOUS
Status: COMPLETED | OUTPATIENT
Start: 2022-12-09 | End: 2022-12-09

## 2022-12-09 RX ORDER — MAGNESIUM SULFATE IN WATER 40 MG/ML
2000 INJECTION, SOLUTION INTRAVENOUS PRN
Status: DISCONTINUED | OUTPATIENT
Start: 2022-12-09 | End: 2022-12-21 | Stop reason: HOSPADM

## 2022-12-09 RX ORDER — ATORVASTATIN CALCIUM 80 MG/1
80 TABLET, FILM COATED ORAL NIGHTLY
Status: DISCONTINUED | OUTPATIENT
Start: 2022-12-09 | End: 2022-12-21 | Stop reason: HOSPADM

## 2022-12-09 RX ORDER — METOPROLOL SUCCINATE 50 MG/1
100 TABLET, EXTENDED RELEASE ORAL DAILY
Status: DISCONTINUED | OUTPATIENT
Start: 2022-12-09 | End: 2022-12-21 | Stop reason: HOSPADM

## 2022-12-09 RX ORDER — POTASSIUM CHLORIDE 20 MEQ/1
40 TABLET, EXTENDED RELEASE ORAL PRN
Status: DISCONTINUED | OUTPATIENT
Start: 2022-12-09 | End: 2022-12-21 | Stop reason: HOSPADM

## 2022-12-09 RX ORDER — TRAMADOL HYDROCHLORIDE 50 MG/1
100 TABLET ORAL 4 TIMES DAILY
Status: DISCONTINUED | OUTPATIENT
Start: 2022-12-09 | End: 2022-12-21 | Stop reason: HOSPADM

## 2022-12-09 RX ORDER — HEPARIN SODIUM 1000 [USP'U]/ML
60 INJECTION, SOLUTION INTRAVENOUS; SUBCUTANEOUS ONCE
Status: COMPLETED | OUTPATIENT
Start: 2022-12-09 | End: 2022-12-09

## 2022-12-09 RX ORDER — HEPARIN SODIUM 1000 [USP'U]/ML
30 INJECTION, SOLUTION INTRAVENOUS; SUBCUTANEOUS PRN
Status: DISCONTINUED | OUTPATIENT
Start: 2022-12-09 | End: 2022-12-09 | Stop reason: HOSPADM

## 2022-12-09 RX ORDER — POTASSIUM CHLORIDE 7.45 MG/ML
10 INJECTION INTRAVENOUS PRN
Status: DISCONTINUED | OUTPATIENT
Start: 2022-12-09 | End: 2022-12-21 | Stop reason: HOSPADM

## 2022-12-09 RX ORDER — MAGNESIUM HYDROXIDE/ALUMINUM HYDROXICE/SIMETHICONE 120; 1200; 1200 MG/30ML; MG/30ML; MG/30ML
30 SUSPENSION ORAL EVERY 6 HOURS PRN
Status: DISCONTINUED | OUTPATIENT
Start: 2022-12-09 | End: 2022-12-21 | Stop reason: HOSPADM

## 2022-12-09 RX ORDER — SODIUM CHLORIDE 9 MG/ML
INJECTION, SOLUTION INTRAVENOUS PRN
Status: DISCONTINUED | OUTPATIENT
Start: 2022-12-09 | End: 2022-12-21 | Stop reason: HOSPADM

## 2022-12-09 RX ORDER — AZITHROMYCIN 250 MG/1
500 TABLET, FILM COATED ORAL
Status: DISCONTINUED | OUTPATIENT
Start: 2022-12-09 | End: 2022-12-19

## 2022-12-09 RX ORDER — CETIRIZINE HYDROCHLORIDE 10 MG/1
10 TABLET ORAL DAILY PRN
Status: DISCONTINUED | OUTPATIENT
Start: 2022-12-09 | End: 2022-12-21 | Stop reason: HOSPADM

## 2022-12-09 RX ORDER — GABAPENTIN 300 MG/1
300 CAPSULE ORAL 3 TIMES DAILY
Status: DISCONTINUED | OUTPATIENT
Start: 2022-12-09 | End: 2022-12-21 | Stop reason: HOSPADM

## 2022-12-09 RX ORDER — ASPIRIN 81 MG/1
81 TABLET, CHEWABLE ORAL DAILY
Status: DISCONTINUED | OUTPATIENT
Start: 2022-12-10 | End: 2022-12-21 | Stop reason: HOSPADM

## 2022-12-09 RX ORDER — HYDROCODONE BITARTRATE AND ACETAMINOPHEN 5; 325 MG/1; MG/1
1 TABLET ORAL
Status: COMPLETED | OUTPATIENT
Start: 2022-12-09 | End: 2022-12-09

## 2022-12-09 RX ORDER — ONDANSETRON 2 MG/ML
4 INJECTION INTRAMUSCULAR; INTRAVENOUS
Status: COMPLETED | OUTPATIENT
Start: 2022-12-09 | End: 2022-12-09

## 2022-12-09 RX ORDER — ALBUTEROL SULFATE 2.5 MG/3ML
2.5 SOLUTION RESPIRATORY (INHALATION) EVERY 4 HOURS PRN
Status: DISCONTINUED | OUTPATIENT
Start: 2022-12-09 | End: 2022-12-21 | Stop reason: HOSPADM

## 2022-12-09 RX ORDER — ONDANSETRON 2 MG/ML
4 INJECTION INTRAMUSCULAR; INTRAVENOUS EVERY 6 HOURS PRN
Status: DISCONTINUED | OUTPATIENT
Start: 2022-12-09 | End: 2022-12-21 | Stop reason: HOSPADM

## 2022-12-09 RX ORDER — LORAZEPAM 2 MG/ML
1 INJECTION INTRAMUSCULAR EVERY 6 HOURS PRN
Status: DISCONTINUED | OUTPATIENT
Start: 2022-12-09 | End: 2022-12-21 | Stop reason: HOSPADM

## 2022-12-09 RX ORDER — HEPARIN SODIUM 10000 [USP'U]/100ML
5-30 INJECTION, SOLUTION INTRAVENOUS CONTINUOUS
Status: DISCONTINUED | OUTPATIENT
Start: 2022-12-09 | End: 2022-12-09 | Stop reason: HOSPADM

## 2022-12-09 RX ORDER — NITROGLYCERIN 0.4 MG/1
0.4 TABLET SUBLINGUAL EVERY 5 MIN PRN
Status: DISCONTINUED | OUTPATIENT
Start: 2022-12-09 | End: 2022-12-21 | Stop reason: HOSPADM

## 2022-12-09 RX ORDER — POLYETHYLENE GLYCOL 3350 17 G/17G
17 POWDER, FOR SOLUTION ORAL DAILY PRN
Status: DISCONTINUED | OUTPATIENT
Start: 2022-12-09 | End: 2022-12-21 | Stop reason: HOSPADM

## 2022-12-09 RX ORDER — 0.9 % SODIUM CHLORIDE 0.9 %
500 INTRAVENOUS SOLUTION INTRAVENOUS
Status: COMPLETED | OUTPATIENT
Start: 2022-12-09 | End: 2022-12-09

## 2022-12-09 RX ORDER — EZETIMIBE 10 MG/1
10 TABLET ORAL NIGHTLY
Status: DISCONTINUED | OUTPATIENT
Start: 2022-12-09 | End: 2022-12-21 | Stop reason: HOSPADM

## 2022-12-09 RX ORDER — HEPARIN SODIUM 1000 [USP'U]/ML
60 INJECTION, SOLUTION INTRAVENOUS; SUBCUTANEOUS PRN
Status: DISCONTINUED | OUTPATIENT
Start: 2022-12-09 | End: 2022-12-09 | Stop reason: HOSPADM

## 2022-12-09 RX ORDER — SODIUM CHLORIDE 9 MG/ML
INJECTION, SOLUTION INTRAVENOUS CONTINUOUS
Status: DISCONTINUED | OUTPATIENT
Start: 2022-12-10 | End: 2022-12-17

## 2022-12-09 RX ORDER — SODIUM CHLORIDE 0.9 % (FLUSH) 0.9 %
10 SYRINGE (ML) INJECTION
Status: COMPLETED | OUTPATIENT
Start: 2022-12-09 | End: 2022-12-09

## 2022-12-09 RX ORDER — HEPARIN SODIUM 1000 [USP'U]/ML
60 INJECTION, SOLUTION INTRAVENOUS; SUBCUTANEOUS PRN
Status: DISCONTINUED | OUTPATIENT
Start: 2022-12-09 | End: 2022-12-16 | Stop reason: ALTCHOICE

## 2022-12-09 RX ORDER — ACYCLOVIR 800 MG/1
800 TABLET ORAL 2 TIMES DAILY
Status: DISCONTINUED | OUTPATIENT
Start: 2022-12-09 | End: 2022-12-21 | Stop reason: HOSPADM

## 2022-12-09 RX ORDER — ACETAMINOPHEN 325 MG/1
650 TABLET ORAL EVERY 4 HOURS PRN
Status: DISCONTINUED | OUTPATIENT
Start: 2022-12-09 | End: 2022-12-21 | Stop reason: HOSPADM

## 2022-12-09 RX ORDER — 0.9 % SODIUM CHLORIDE 0.9 %
100 INTRAVENOUS SOLUTION INTRAVENOUS ONCE
Status: COMPLETED | OUTPATIENT
Start: 2022-12-09 | End: 2022-12-09

## 2022-12-09 RX ORDER — PANTOPRAZOLE SODIUM 40 MG/1
40 TABLET, DELAYED RELEASE ORAL 2 TIMES DAILY
Status: DISCONTINUED | OUTPATIENT
Start: 2022-12-09 | End: 2022-12-21 | Stop reason: HOSPADM

## 2022-12-09 RX ORDER — NITROGLYCERIN 20 MG/100ML
5-200 INJECTION INTRAVENOUS CONTINUOUS
Status: DISCONTINUED | OUTPATIENT
Start: 2022-12-09 | End: 2022-12-09 | Stop reason: HOSPADM

## 2022-12-09 RX ORDER — TORSEMIDE 20 MG/1
10 TABLET ORAL DAILY
Status: DISCONTINUED | OUTPATIENT
Start: 2022-12-09 | End: 2022-12-21 | Stop reason: HOSPADM

## 2022-12-09 RX ORDER — CLOPIDOGREL BISULFATE 75 MG/1
75 TABLET ORAL DAILY
Status: DISCONTINUED | OUTPATIENT
Start: 2022-12-10 | End: 2022-12-21 | Stop reason: HOSPADM

## 2022-12-09 RX ORDER — 0.9 % SODIUM CHLORIDE 0.9 %
1000 INTRAVENOUS SOLUTION INTRAVENOUS
Status: COMPLETED | OUTPATIENT
Start: 2022-12-09 | End: 2022-12-09

## 2022-12-09 RX ADMIN — HEPARIN SODIUM 12 UNITS/KG/HR: 10000 INJECTION, SOLUTION INTRAVENOUS at 23:18

## 2022-12-09 RX ADMIN — ONDANSETRON 4 MG: 2 INJECTION INTRAMUSCULAR; INTRAVENOUS at 14:39

## 2022-12-09 RX ADMIN — IOPAMIDOL 100 ML: 755 INJECTION, SOLUTION INTRAVENOUS at 14:01

## 2022-12-09 RX ADMIN — MORPHINE SULFATE 4 MG: 4 INJECTION INTRAVENOUS at 14:39

## 2022-12-09 RX ADMIN — LORAZEPAM 1 MG: 2 INJECTION INTRAMUSCULAR; INTRAVENOUS at 23:50

## 2022-12-09 RX ADMIN — NITROGLYCERIN 5 MCG/MIN: 20 INJECTION INTRAVENOUS at 15:12

## 2022-12-09 RX ADMIN — HEPARIN SODIUM 3540 UNITS: 1000 INJECTION INTRAVENOUS; SUBCUTANEOUS at 15:10

## 2022-12-09 RX ADMIN — SODIUM CHLORIDE 1000 ML: 9 INJECTION, SOLUTION INTRAVENOUS at 13:18

## 2022-12-09 RX ADMIN — SODIUM CHLORIDE 500 ML: 9 INJECTION, SOLUTION INTRAVENOUS at 16:04

## 2022-12-09 RX ADMIN — MOMETASONE FUROATE AND FORMOTEROL FUMARATE DIHYDRATE 2 PUFF: 200; 5 AEROSOL RESPIRATORY (INHALATION) at 20:01

## 2022-12-09 RX ADMIN — NITROGLYCERIN 1 INCH: 20 OINTMENT TOPICAL at 23:17

## 2022-12-09 RX ADMIN — SODIUM CHLORIDE, PRESERVATIVE FREE 10 ML: 5 INJECTION INTRAVENOUS at 14:01

## 2022-12-09 RX ADMIN — SODIUM CHLORIDE 100 ML: 9 INJECTION, SOLUTION INTRAVENOUS at 14:01

## 2022-12-09 RX ADMIN — ALBUTEROL SULFATE 2.5 MG: 2.5 SOLUTION RESPIRATORY (INHALATION) at 23:30

## 2022-12-09 RX ADMIN — SODIUM CHLORIDE, PRESERVATIVE FREE 10 ML: 5 INJECTION INTRAVENOUS at 23:22

## 2022-12-09 RX ADMIN — HEPARIN SODIUM 12 UNITS/KG/HR: 10000 INJECTION, SOLUTION INTRAVENOUS at 15:06

## 2022-12-09 RX ADMIN — SODIUM CHLORIDE: 9 INJECTION, SOLUTION INTRAVENOUS at 23:10

## 2022-12-09 RX ADMIN — NITROGLYCERIN 1 INCH: 20 OINTMENT TOPICAL at 13:11

## 2022-12-09 RX ADMIN — HYDROCODONE BITARTRATE AND ACETAMINOPHEN 1 TABLET: 5; 325 TABLET ORAL at 13:11

## 2022-12-09 RX ADMIN — ASPIRIN 81 MG 324 MG: 81 TABLET ORAL at 14:39

## 2022-12-09 ASSESSMENT — ENCOUNTER SYMPTOMS
BACK PAIN: 0
DIARRHEA: 0
COUGH: 0
NAUSEA: 0
VOMITING: 0
SHORTNESS OF BREATH: 1
ABDOMINAL PAIN: 0

## 2022-12-09 ASSESSMENT — PAIN SCALES - GENERAL
PAINLEVEL_OUTOF10: 4
PAINLEVEL_OUTOF10: 0

## 2022-12-09 ASSESSMENT — PAIN DESCRIPTION - LOCATION: LOCATION: CHEST

## 2022-12-09 ASSESSMENT — PAIN - FUNCTIONAL ASSESSMENT: PAIN_FUNCTIONAL_ASSESSMENT: 0-10

## 2022-12-09 NOTE — ED PROVIDER NOTES
Emergency Department Provider Note                   PCP:                Rosa Isela Robison MD               Age: 67 y.o. Sex: female     No diagnosis found. DISPOSITION          MDM  Number of Diagnoses or Management Options  Abnormal EKG  Chest pain, unspecified type  Diagnosis management comments: She will EKG shows sinus tachycardia at 121 with ST depression inferior laterally. No ST elevation noted. EKG was repeated approximately an hour and a half later with decreased heart rate and significantly improved ST depression. Chest x-ray shows COPD with stable posttreatment changes left lower lobe. No pneumonia or edema. Blood work shows stable anemia with hemoglobin 8.8 hematocrit 27. Mild hypokalemia 3.3. Lactic acid 1.7, troponin 28.9. Patient treated with hydrocodone for her shingles pain. Also treated with Nitropaste due to underlying cardiac disease and chest pain. During observation, patient's chest pain returned. A third EKG was obtained and shows progression of her ST depression inferior laterally. Still no ST elevation. Patient is being placed on heparin drip and has been treated with aspirin. We will convert from Nitropaste to nitroglycerin drip. Cardiology consulted and will transfer downtown for further management.        Amount and/or Complexity of Data Reviewed  Clinical lab tests: ordered and reviewed  Tests in the radiology section of CPT®: ordered and reviewed  Tests in the medicine section of CPT®: ordered and reviewed  Independent visualization of images, tracings, or specimens: yes    Risk of Complications, Morbidity, and/or Mortality  Presenting problems: high  Diagnostic procedures: high  Management options: high               Orders Placed This Encounter   Procedures    Culture, Blood 1    Culture, Blood 2    Rapid influenza A/B antigens    COVID-19, Rapid    XR CHEST (2 VW)    Lactate, Sepsis    CBC with Auto Differential    CMP    Procalcitonin    Urinalysis w rflx microscopic    Troponin T    Cardiac Monitor - ED Only    Straight Cath (Select if patient is unable to provide a sample)    EKG 12 Lead    Saline lock IV        Medications   0.9 % sodium chloride bolus (has no administration in time range)       New Prescriptions    No medications on file        Tierney Shoemaker is a 67 y.o. female who presents to the Emergency Department with chief complaint of    Chief Complaint   Patient presents with    Chest Pain    Shortness of Breath    Fever      53-year-old white female history of lung cancer, O2 dependent COPD, coronary artery disease with stenting presents with shortness of breath and low-grade fever over the past week. She has noted that on her usual 2 L of oxygen her saturations are between 80 and 85%. She has increased her oxygen to 3 L with improvement to the 90s. She reports the shortness of breath is aggravated by minimal exertion. Today she developed a chest discomfort described as a heaviness. It radiates into both axilla. This resolved with nitroglycerin. She denies cough, vomiting, diarrhea. Only other complaint is shingles pain. She was started on acyclovir for this by her oncologist.    The history is provided by the patient. Review of Systems   Constitutional:  Positive for fever. HENT:  Negative for congestion. Respiratory:  Positive for shortness of breath. Negative for cough. Cardiovascular:  Positive for chest pain. Gastrointestinal:  Negative for abdominal pain, diarrhea, nausea and vomiting. Genitourinary:  Negative for dysuria. Musculoskeletal:  Negative for back pain. Skin:  Negative for rash. Neurological:  Negative for headaches. All other systems reviewed and are negative.     Past Medical History:   Diagnosis Date    Asthma     Closed right hip fracture ([facility] [address] 02/2022    Coronary artery disease     Former cigarette smoker     Fracture of right femur ([facility] [address] 02/2022    Hyperthyroidism     Lung mass     Multiple closed anterior-posterior compression fractures of pelvis ([facility] [address] 2022    Non-small cell lung cancer ([facility] [address]     Severe chronic obstructive pulmonary disease ([address]     Subdural hematoma 2022    Thyroid nodule         Past Surgical History:   Procedure Laterality Date    BRONCHOSCOPY N/A 2022    BRONCHOSCOPY ENDOBRONCHIAL ULTRASOUND performed by Ban Mccracken MD at [facility] ENDOSCOPY    CAROTID ENDARTERECTOMY Right     CATARACT REMOVAL Bilateral     CORONARY STENT PLACEMENT  2022    HIP FRACTURE SURGERY Right 2022    IR PORT PLACEMENT EQUAL OR GREATER THAN 5 YEARS  2022    IR PORT PLACEMENT EQUAL OR GREATER THAN 5 YEARS 2022 [facility]    WISDOM TOOTH EXTRACTION      as a teenager        Family History   Adopted: Yes   Problem Relation Age of Onset    No Known Problems Mother     No Known Problems Father     Thyroid Disease Daughter         Social History     Socioeconomic History    Marital status:      Spouse name: None    Number of children: None    Years of education: None    Highest education level: None   Tobacco Use    Smoking status: Former     Packs/day: 0.50     Years: 50.00     Pack years: 25.00     Types: Cigarettes     Quit date:      Years since quittin.9    Smokeless tobacco: Never   Substance and Sexual Activity    Alcohol use: Not Currently         Promethazine     Previous Medications    ACETAMINOPHEN (TYLENOL) 325 MG TABLET    Take by mouth every 4 hours as needed    ACYCLOVIR (ZOVIRAX) 800 MG TABLET    Take 1 tablet by mouth 2 times daily    ALBUTEROL (PROVENTIL) (2.5 MG/3ML) 0.083% NEBULIZER SOLUTION    TAKE 3 ML BY NEBULIZATION EVERY 6 HOURS AS NEEDED FOR SHORTNESS OF BREATH    ASPIRIN 81 MG CHEWABLE TABLET    Take 81 mg by mouth daily    ATORVASTATIN (LIPITOR) 80 MG TABLET    Take 80 mg by mouth at bedtime TAKE 1 TABLET BY MOUTH DAILY    AZITHROMYCIN (ZITHROMAX) 500 MG TABLET Take by mouth    BUDESONIDE-FORMOTEROL (SYMBICORT) 160-4.5 MCG/ACT AERO    Inhale 2 puffs into the lungs in the morning and 2 puffs before bedtime. CETIRIZINE (ZYRTEC) 10 MG TABLET    Take 10 mg by mouth daily as needed    CLOPIDOGREL (PLAVIX) 75 MG TABLET    Take 75 mg by mouth daily    ESCITALOPRAM (LEXAPRO) 10 MG TABLET    Take 10 mg by mouth daily    EZETIMIBE (ZETIA) 10 MG TABLET    Take 10 mg by mouth at bedtime    GABAPENTIN (NEURONTIN) 100 MG CAPSULE    Take 3 capsules by mouth 3 times daily for 30 days. HYDROCODONE-ACETAMINOPHEN (NORCO) 5-325 MG PER TABLET    Take 2 tablets by mouth every 6 hours as needed for Pain for up to 20 days. Intended supply: 3 days. Take lowest dose possible to manage pain    KETOROLAC (TORADOL) 10 MG TABLET    Take 1 tablet by mouth every 6 hours as needed for Pain    LIDOCAINE-PRILOCAINE (EMLA) 2.5-2.5 % CREAM    Apply topically as needed.     MAGIC MOUTHWASH (MIRACLE MOUTHWASH)    Swish and swallow 5 mLs 4 times daily as needed for Irritation Equal parts viscous lidocaine, maalox, nystatin, benadryl    METHIMAZOLE (TAPAZOLE) 10 MG TABLET    Take 0.5 tablets by mouth daily    METOPROLOL SUCCINATE (TOPROL XL) 100 MG EXTENDED RELEASE TABLET    Take 100 mg by mouth daily    NITROGLYCERIN (NITROSTAT) 0.4 MG SL TABLET    Place 0.4 mg under the tongue 3 times daily as needed    ONDANSETRON (ZOFRAN ODT) 4 MG DISINTEGRATING TABLET    Take 1 tablet by mouth every 8 hours as needed for Nausea or Vomiting    ONDANSETRON (ZOFRAN) 8 MG TABLET    Take 1 tablet by mouth every 8 hours as needed for Nausea or Vomiting    OXYGEN    Inhale into the lungs 2 LPM    PANTOPRAZOLE (PROTONIX) 40 MG TABLET    Take 40 mg by mouth 2 times daily    PREDNISONE (DELTASONE) 10 MG TABLET    Take 10 mg by mouth daily (with breakfast)    PREDNISONE (DELTASONE) 5 MG TABLET        PROCHLORPERAZINE (COMPAZINE) 10 MG TABLET    Take 1 tablet by mouth every 6 hours as needed (chemo-induced nausea) PSEUDOEPHEDRINE-GUAIFENESIN (MUCINEX D PO)    Take by mouth in the morning and at bedtime    ROFLUMILAST (DALIRESP) 500 MCG TABLET    Take 500 mcg by mouth nightly    TIOTROPIUM (SPIRIVA RESPIMAT) 2.5 MCG/ACT AERS INHALER    Inhale 2 puffs into the lungs in the morning. TORSEMIDE (DEMADEX) 10 MG TABLET    Take 10 mg by mouth daily    TRAMADOL (ULTRAM) 50 MG TABLET    Take 2 tablets by mouth 4 times daily for 90 days. Z51.5 Palliative Care        Vitals signs and nursing note reviewed. Patient Vitals for the past 4 hrs:   Temp Pulse Resp BP SpO2   12/09/22 1159 97.9 °F (36.6 °C) (!) 122 23 (!) 150/78 99 %          Physical Exam  Vitals and nursing note reviewed. Constitutional:       General: She is not in acute distress. Appearance: Normal appearance. She is not toxic-appearing. HENT:      Head: Normocephalic and atraumatic. Nose: Nose normal.      Mouth/Throat:      Mouth: Mucous membranes are moist.      Pharynx: Oropharynx is clear. Eyes:      Conjunctiva/sclera: Conjunctivae normal.      Pupils: Pupils are equal, round, and reactive to light. Cardiovascular:      Rate and Rhythm: Regular rhythm. Tachycardia present. Heart sounds: No murmur heard. Pulmonary:      Effort: Pulmonary effort is normal.      Breath sounds: Wheezing present. Comments: Trace wheezes. Abdominal:      General: There is no distension. Palpations: Abdomen is soft. Tenderness: There is no abdominal tenderness. There is no guarding. Musculoskeletal:         General: Swelling present. Cervical back: Normal range of motion and neck supple. Skin:     General: Skin is warm and dry. Neurological:      Mental Status: She is alert and oriented to person, place, and time.    Psychiatric:         Mood and Affect: Mood normal.         Behavior: Behavior normal.        EKG 12 Lead    Date/Time: 12/9/2022 1:31 PM  Performed by: Adtii hTayer MD  Authorized by: Aditi Thayer MD     ECG reviewed by ED Physician in the absence of a cardiologist: yes    Interpretation:     Interpretation: abnormal    Rate:     ECG rate:  121    ECG rate assessment: tachycardic    Rhythm:     Rhythm: sinus tachycardia    Ectopy:     Ectopy: none    QRS:     QRS axis:  Normal  ST segments:     ST segments:  Depression    Depression:  II, III, aVF, V6, V5 and V4  T waves:     T waves: normal    EKG 12 Lead    Date/Time: 12/9/2022 1:32 PM  Performed by: Cindy Fountain MD  Authorized by: Cindy Fountain MD     ECG reviewed by ED Physician in the absence of a cardiologist: yes    Interpretation:     Interpretation: abnormal    Rate:     ECG rate:  100    ECG rate assessment: tachycardic    Rhythm:     Rhythm: sinus rhythm    Ectopy:     Ectopy: none    QRS:     QRS axis:  Normal  ST segments:     ST segments:  Depression    Depression:  II, V6, V5 and V4  EKG 12 Lead    Date/Time: 12/9/2022 2:39 PM  Performed by: Cindy Fountain MD  Authorized by: Cindy Fountain MD     ECG reviewed by ED Physician in the absence of a cardiologist: yes    Interpretation:     Interpretation: abnormal    Rate:     ECG rate:  105    ECG rate assessment: tachycardic    Rhythm:     Rhythm: sinus tachycardia    Ectopy:     Ectopy: none    QRS:     QRS axis:  Normal  ST segments:     ST segments:  Depression    Depression:  II, III, aVF, V6, V5, V4 and V3  T waves:     T waves: normal    Critical Care  Performed by: Cindy Fountain MD  Authorized by: Cindy Fountain MD     Critical care provider statement:     Critical care time (minutes):  35    Critical care time was exclusive of:  Separately billable procedures and treating other patients    Critical care was necessary to treat or prevent imminent or life-threatening deterioration of the following conditions: Agement of chest pain with EKG changes believed to be due to ACS.     Critical care was time spent personally by me on the following activities:  Blood draw for specimens, discussions with consultants, evaluation of patient's response to treatment, examination of patient, ordering and performing treatments and interventions, ordering and review of laboratory studies, ordering and review of radiographic studies, pulse oximetry, re-evaluation of patient's condition and review of old charts    I assumed direction of critical care for this patient from another provider in my specialty: no      Care discussed with: admitting provider      No results found for any visits on 12/09/22. XR CHEST (2 VW)    (Results Pending)                       Voice dictation software was used during the making of this note. This software is not perfect and grammatical and other typographical errors may be present. This note has not been completely proofread for errors.      Mark Patrick MD  12/09/22 1555

## 2022-12-09 NOTE — ED NOTES
[facility] on 3rd floor unable to take report at this time. Pt is currently in transport via [address] [employer]. Darrion Craig RN  12/09/22 0075

## 2022-12-09 NOTE — PROGRESS NOTES
12/9/2022 - Pt left VM with navigator; returned pt's call; pt stating she was experiencing SOB and has had to increase her supplemental oxygen, she was having chest pain that started ~ 7am this morning; took a nitro a few minutes ago, last nitro taken was ~ 7a; still having chest pain. Tachycardic @ 123 just now. Advised pt to go to the ER. Encouraged to call with questions. Navigation will continue to follow.

## 2022-12-09 NOTE — ED TRIAGE NOTES
Pt states she has had SOB for past week and fever, states worse today, normally wears 2 L NC at home has been wearing 3 since SOB started. Hx of stage 3 lung cancer. Fever of 100 - 100.4 at home. Just took 2 tylenol, took 4 SL NTG within last hour she states. States that helped chest pain but it comes back. Denies cough.

## 2022-12-09 NOTE — ED NOTES
TRANSFER - OUT REPORT:    Verbal report given to [employer] on Liz Ahn  being transferred to [facility] for routine progression of patient care       Report consisted of patient's Situation, Background, Assessment and   Recommendations(SBAR). Information from the following report(s) ED SBAR was reviewed with the receiving nurse. [place] Assessment: No data recorded  Lines:   Single Lumen Implantable Port 08/05/22 Right Subclavian (Active)       Peripheral IV 12/09/22 Proximal;Right; Anterior Forearm (Active)        Opportunity for questions and clarification was provided. Patient transported with:  O2 @ 2lpm           Darrion Arnold, RN  12/09/22 0072

## 2022-12-10 ENCOUNTER — APPOINTMENT (OUTPATIENT)
Dept: GENERAL RADIOLOGY | Age: 72
DRG: 189 | End: 2022-12-10
Attending: INTERNAL MEDICINE
Payer: MEDICARE

## 2022-12-10 ENCOUNTER — APPOINTMENT (OUTPATIENT)
Dept: NON INVASIVE DIAGNOSTICS | Age: 72
DRG: 189 | End: 2022-12-10
Attending: INTERNAL MEDICINE
Payer: MEDICARE

## 2022-12-10 LAB
ANION GAP SERPL CALC-SCNC: 9 MMOL/L (ref 2–11)
ARTERIAL PATENCY WRIST A: POSITIVE
ARTERIAL PATENCY WRIST A: POSITIVE
BASE DEFICIT BLD-SCNC: 1 MMOL/L
BASE DEFICIT BLD-SCNC: 1.2 MMOL/L
BDY SITE: ABNORMAL
BDY SITE: ABNORMAL
BUN SERPL-MCNC: 11 MG/DL (ref 8–23)
CALCIUM SERPL-MCNC: 8.8 MG/DL (ref 8.3–10.4)
CHLORIDE SERPL-SCNC: 101 MMOL/L (ref 101–110)
CHOLEST SERPL-MCNC: 90 MG/DL
CO2 SERPL-SCNC: 24 MMOL/L (ref 21–32)
CREAT SERPL-MCNC: 0.7 MG/DL (ref 0.6–1)
ECHO AO ROOT DIAM: 2.2 CM
ECHO AO ROOT INDEX: 1.33 CM/M2
ECHO AR MAX VEL PISA: 4.4 M/S
ECHO AV AREA PEAK VELOCITY: 1.4 CM2
ECHO AV AREA VTI: 1.7 CM2
ECHO AV AREA/BSA PEAK VELOCITY: 0.8 CM2/M2
ECHO AV AREA/BSA VTI: 1 CM2/M2
ECHO AV MEAN GRADIENT: 5 MMHG
ECHO AV MEAN VELOCITY: 1 M/S
ECHO AV PEAK GRADIENT: 8 MMHG
ECHO AV PEAK VELOCITY: 1.4 M/S
ECHO AV REGURGITANT PHT: 384 MS
ECHO AV VELOCITY RATIO: 0.57
ECHO AV VTI: 26.9 CM
ECHO BSA: 1.64 M2
ECHO EST RA PRESSURE: 3 MMHG
ECHO IVC PROX: 1.7 CM
ECHO LA AREA 2C: 17.4 CM2
ECHO LA AREA 4C: 15.2 CM2
ECHO LA DIAMETER INDEX: 2.3 CM/M2
ECHO LA DIAMETER: 3.8 CM
ECHO LA MAJOR AXIS: 4.7 CM
ECHO LA MINOR AXIS: 5.3 CM
ECHO LA TO AORTIC ROOT RATIO: 1.73
ECHO LA VOL 2C: 46 ML (ref 22–52)
ECHO LA VOL 4C: 38 ML (ref 22–52)
ECHO LA VOL BP: 43 ML (ref 22–52)
ECHO LA VOL/BSA BIPLANE: 26 ML/M2 (ref 16–34)
ECHO LA VOLUME INDEX A2C: 28 ML/M2 (ref 16–34)
ECHO LA VOLUME INDEX A4C: 23 ML/M2 (ref 16–34)
ECHO LV E' LATERAL VELOCITY: 13 CM/S
ECHO LV E' SEPTAL VELOCITY: 8 CM/S
ECHO LV EDV A2C: 86 ML
ECHO LV EDV A4C: 79 ML
ECHO LV EDV INDEX A4C: 48 ML/M2
ECHO LV EDV NDEX A2C: 52 ML/M2
ECHO LV EJECTION FRACTION A2C: 53 %
ECHO LV EJECTION FRACTION A4C: 51 %
ECHO LV EJECTION FRACTION BIPLANE: 52 % (ref 55–100)
ECHO LV ESV A2C: 41 ML
ECHO LV ESV A4C: 39 ML
ECHO LV ESV INDEX A2C: 25 ML/M2
ECHO LV ESV INDEX A4C: 24 ML/M2
ECHO LV FRACTIONAL SHORTENING: 28 % (ref 28–44)
ECHO LV INTERNAL DIMENSION DIASTOLE INDEX: 2.85 CM/M2
ECHO LV INTERNAL DIMENSION DIASTOLIC: 4.7 CM (ref 3.9–5.3)
ECHO LV INTERNAL DIMENSION SYSTOLIC INDEX: 2.06 CM/M2
ECHO LV INTERNAL DIMENSION SYSTOLIC: 3.4 CM
ECHO LV IVSD: 0.7 CM (ref 0.6–0.9)
ECHO LV MASS 2D: 112.5 G (ref 67–162)
ECHO LV MASS INDEX 2D: 68.2 G/M2 (ref 43–95)
ECHO LV POSTERIOR WALL DIASTOLIC: 0.8 CM (ref 0.6–0.9)
ECHO LV RELATIVE WALL THICKNESS RATIO: 0.34
ECHO LVOT AREA: 2.5 CM2
ECHO LVOT AV VTI INDEX: 0.67
ECHO LVOT DIAM: 1.8 CM
ECHO LVOT MEAN GRADIENT: 1 MMHG
ECHO LVOT PEAK GRADIENT: 2 MMHG
ECHO LVOT PEAK VELOCITY: 0.8 M/S
ECHO LVOT STROKE VOLUME INDEX: 27.9 ML/M2
ECHO LVOT SV: 46 ML
ECHO LVOT VTI: 18.1 CM
ECHO MV A VELOCITY: 1.43 M/S
ECHO MV E DECELERATION TIME (DT): 111 MS
ECHO MV E VELOCITY: 0.7 M/S
ECHO MV E/A RATIO: 0.49
ECHO MV E/E' LATERAL: 5.38
ECHO MV E/E' RATIO (AVERAGED): 7.07
ECHO MV E/E' SEPTAL: 8.75
ECHO MV REGURGITANT PEAK GRADIENT: 121 MMHG
ECHO MV REGURGITANT PEAK VELOCITY: 5.5 M/S
ECHO PULMONARY ARTERY END DIASTOLIC PRESSURE: 14 MMHG
ECHO PV MAX VELOCITY: 1 M/S
ECHO PV PEAK GRADIENT: 4 MMHG
ECHO PV REGURGITANT MAX VELOCITY: 1.9 M/S
ECHO RIGHT VENTRICULAR SYSTOLIC PRESSURE (RVSP): 42 MMHG
ECHO RV BASAL DIMENSION: 3.4 CM
ECHO RV FREE WALL PEAK S': 14 CM/S
ECHO RV INTERNAL DIMENSION: 3 CM
ECHO RV TAPSE: 1.9 CM (ref 1.7–?)
ECHO TV REGURGITANT MAX VELOCITY: 3.12 M/S
ECHO TV REGURGITANT PEAK GRADIENT: 39 MMHG
EKG ATRIAL RATE: 134 BPM
EKG ATRIAL RATE: 72 BPM
EKG DIAGNOSIS: NORMAL
EKG DIAGNOSIS: NORMAL
EKG P AXIS: 73 DEGREES
EKG P AXIS: 83 DEGREES
EKG P-R INTERVAL: 164 MS
EKG P-R INTERVAL: 164 MS
EKG Q-T INTERVAL: 266 MS
EKG Q-T INTERVAL: 404 MS
EKG QRS DURATION: 82 MS
EKG QRS DURATION: 84 MS
EKG QTC CALCULATION (BAZETT): 397 MS
EKG QTC CALCULATION (BAZETT): 442 MS
EKG R AXIS: 67 DEGREES
EKG R AXIS: 74 DEGREES
EKG T AXIS: 105 DEGREES
EKG T AXIS: 180 DEGREES
EKG VENTRICULAR RATE: 134 BPM
EKG VENTRICULAR RATE: 72 BPM
ERYTHROCYTE [DISTWIDTH] IN BLOOD BY AUTOMATED COUNT: 17.8 % (ref 11.9–14.6)
GAS FLOW.O2 O2 DELIVERY SYS: ABNORMAL L/MIN
GLUCOSE SERPL-MCNC: 184 MG/DL (ref 65–100)
HCO3 BLD-SCNC: 23.7 MMOL/L (ref 22–26)
HCO3 BLD-SCNC: 24.4 MMOL/L (ref 22–26)
HCT VFR BLD AUTO: 27.8 % (ref 35.8–46.3)
HDLC SERPL-MCNC: 56 MG/DL (ref 40–60)
HDLC SERPL: 1.6 {RATIO}
HGB BLD-MCNC: 8.6 G/DL (ref 11.7–15.4)
INSPIRATION.DURATION SETTING TIME VENT: 0.9 SEC
IPAP/PIP/HIGH PEEP: 14
LDLC SERPL CALC-MCNC: 19.2 MG/DL
MAGNESIUM SERPL-MCNC: 1.8 MG/DL (ref 1.8–2.4)
MCH RBC QN AUTO: 31.3 PG (ref 26.1–32.9)
MCHC RBC AUTO-ENTMCNC: 30.9 G/DL (ref 31.4–35)
MCV RBC AUTO: 101.1 FL (ref 82–102)
NRBC # BLD: 0 K/UL (ref 0–0.2)
NT PRO BNP: 3073 PG/ML (ref 5–125)
O2/TOTAL GAS SETTING VFR VENT: 40 %
PCO2 BLD: 39.6 MMHG (ref 35–45)
PCO2 BLD: 42.9 MMHG (ref 35–45)
PH BLD: 7.36 [PH] (ref 7.35–7.45)
PH BLD: 7.39 [PH] (ref 7.35–7.45)
PLATELET # BLD AUTO: 196 K/UL (ref 150–450)
PMV BLD AUTO: 9.4 FL (ref 9.4–12.3)
PO2 BLD: 159 MMHG (ref 75–100)
PO2 BLD: 377 MMHG (ref 75–100)
POC FIO2: 5
POTASSIUM SERPL-SCNC: 3.6 MMOL/L (ref 3.5–5.1)
RBC # BLD AUTO: 2.75 M/UL (ref 4.05–5.2)
SAO2 % BLD: 100 % (ref 95–98)
SAO2 % BLD: 99.4 % (ref 95–98)
SERVICE CMNT-IMP: ABNORMAL
SERVICE CMNT-IMP: ABNORMAL
SODIUM SERPL-SCNC: 134 MMOL/L (ref 133–143)
SPECIMEN TYPE: ABNORMAL
SPECIMEN TYPE: ABNORMAL
TRIGL SERPL-MCNC: 74 MG/DL (ref 35–150)
TROPONIN I SERPL HS-MCNC: 123.6 PG/ML (ref 0–14)
UFH PPP CHRO-ACNC: 0.3 IU/ML (ref 0.3–0.7)
UFH PPP CHRO-ACNC: 0.47 IU/ML (ref 0.3–0.7)
VENTILATION MODE VENT: ABNORMAL
VLDLC SERPL CALC-MCNC: 14.8 MG/DL (ref 6–23)
WBC # BLD AUTO: 7.8 K/UL (ref 4.3–11.1)

## 2022-12-10 PROCEDURE — 83735 ASSAY OF MAGNESIUM: CPT

## 2022-12-10 PROCEDURE — 85520 HEPARIN ASSAY: CPT

## 2022-12-10 PROCEDURE — 94640 AIRWAY INHALATION TREATMENT: CPT

## 2022-12-10 PROCEDURE — 99223 1ST HOSP IP/OBS HIGH 75: CPT | Performed by: INTERNAL MEDICINE

## 2022-12-10 PROCEDURE — 6370000000 HC RX 637 (ALT 250 FOR IP): Performed by: PHYSICIAN ASSISTANT

## 2022-12-10 PROCEDURE — 93306 TTE W/DOPPLER COMPLETE: CPT

## 2022-12-10 PROCEDURE — 6370000000 HC RX 637 (ALT 250 FOR IP): Performed by: NURSE PRACTITIONER

## 2022-12-10 PROCEDURE — G0378 HOSPITAL OBSERVATION PER HR: HCPCS

## 2022-12-10 PROCEDURE — 93306 TTE W/DOPPLER COMPLETE: CPT | Performed by: INTERNAL MEDICINE

## 2022-12-10 PROCEDURE — 6360000002 HC RX W HCPCS: Performed by: EMERGENCY MEDICINE

## 2022-12-10 PROCEDURE — 99232 SBSQ HOSP IP/OBS MODERATE 35: CPT | Performed by: INTERNAL MEDICINE

## 2022-12-10 PROCEDURE — 94760 N-INVAS EAR/PLS OXIMETRY 1: CPT

## 2022-12-10 PROCEDURE — 2700000000 HC OXYGEN THERAPY PER DAY

## 2022-12-10 PROCEDURE — 94660 CPAP INITIATION&MGMT: CPT

## 2022-12-10 PROCEDURE — 36600 WITHDRAWAL OF ARTERIAL BLOOD: CPT

## 2022-12-10 PROCEDURE — 2580000003 HC RX 258: Performed by: NURSE PRACTITIONER

## 2022-12-10 PROCEDURE — 6360000002 HC RX W HCPCS: Performed by: NURSE PRACTITIONER

## 2022-12-10 PROCEDURE — 83880 ASSAY OF NATRIURETIC PEPTIDE: CPT

## 2022-12-10 PROCEDURE — 80061 LIPID PANEL: CPT

## 2022-12-10 PROCEDURE — 82803 BLOOD GASES ANY COMBINATION: CPT

## 2022-12-10 PROCEDURE — 80048 BASIC METABOLIC PNL TOTAL CA: CPT

## 2022-12-10 PROCEDURE — 84484 ASSAY OF TROPONIN QUANT: CPT

## 2022-12-10 PROCEDURE — 85027 COMPLETE CBC AUTOMATED: CPT

## 2022-12-10 PROCEDURE — 71045 X-RAY EXAM CHEST 1 VIEW: CPT

## 2022-12-10 PROCEDURE — 94761 N-INVAS EAR/PLS OXIMETRY MLT: CPT

## 2022-12-10 RX ORDER — LEVALBUTEROL INHALATION SOLUTION 1.25 MG/3ML
1.25 SOLUTION RESPIRATORY (INHALATION) EVERY 4 HOURS PRN
Status: DISCONTINUED | OUTPATIENT
Start: 2022-12-10 | End: 2022-12-16 | Stop reason: CLARIF

## 2022-12-10 RX ORDER — TERBUTALINE SULFATE 1 MG/ML
0.25 INJECTION, SOLUTION SUBCUTANEOUS ONCE
Status: COMPLETED | OUTPATIENT
Start: 2022-12-10 | End: 2022-12-10

## 2022-12-10 RX ORDER — FUROSEMIDE 10 MG/ML
40 INJECTION INTRAMUSCULAR; INTRAVENOUS ONCE
Status: COMPLETED | OUTPATIENT
Start: 2022-12-10 | End: 2022-12-10

## 2022-12-10 RX ORDER — METHYLPREDNISOLONE SODIUM SUCCINATE 125 MG/2ML
125 INJECTION, POWDER, LYOPHILIZED, FOR SOLUTION INTRAMUSCULAR; INTRAVENOUS DAILY
Status: DISCONTINUED | OUTPATIENT
Start: 2022-12-10 | End: 2022-12-11

## 2022-12-10 RX ORDER — POTASSIUM CHLORIDE 7.45 MG/ML
10 INJECTION INTRAVENOUS ONCE
Status: COMPLETED | OUTPATIENT
Start: 2022-12-10 | End: 2022-12-10

## 2022-12-10 RX ADMIN — TRAMADOL HYDROCHLORIDE 100 MG: 50 TABLET ORAL at 09:20

## 2022-12-10 RX ADMIN — SODIUM CHLORIDE, PRESERVATIVE FREE 5 ML: 5 INJECTION INTRAVENOUS at 09:22

## 2022-12-10 RX ADMIN — METHYLPREDNISOLONE SODIUM SUCCINATE 125 MG: 125 INJECTION, POWDER, FOR SOLUTION INTRAMUSCULAR; INTRAVENOUS at 01:58

## 2022-12-10 RX ADMIN — TRAMADOL HYDROCHLORIDE 100 MG: 50 TABLET ORAL at 12:40

## 2022-12-10 RX ADMIN — LEVALBUTEROL HYDROCHLORIDE 1.25 MG: 1.25 SOLUTION RESPIRATORY (INHALATION) at 01:16

## 2022-12-10 RX ADMIN — TRAMADOL HYDROCHLORIDE 100 MG: 50 TABLET ORAL at 17:04

## 2022-12-10 RX ADMIN — GUAIFENESIN 600 MG: 600 TABLET ORAL at 20:30

## 2022-12-10 RX ADMIN — SODIUM CHLORIDE, PRESERVATIVE FREE 10 ML: 5 INJECTION INTRAVENOUS at 20:30

## 2022-12-10 RX ADMIN — LEVALBUTEROL HYDROCHLORIDE 1.25 MG: 1.25 SOLUTION RESPIRATORY (INHALATION) at 01:17

## 2022-12-10 RX ADMIN — PANTOPRAZOLE SODIUM 40 MG: 40 TABLET, DELAYED RELEASE ORAL at 09:20

## 2022-12-10 RX ADMIN — MOMETASONE FUROATE AND FORMOTEROL FUMARATE DIHYDRATE 2 PUFF: 200; 5 AEROSOL RESPIRATORY (INHALATION) at 18:01

## 2022-12-10 RX ADMIN — GUAIFENESIN 600 MG: 600 TABLET ORAL at 09:20

## 2022-12-10 RX ADMIN — TRAMADOL HYDROCHLORIDE 100 MG: 50 TABLET ORAL at 21:09

## 2022-12-10 RX ADMIN — ACYCLOVIR 800 MG: 800 TABLET ORAL at 21:09

## 2022-12-10 RX ADMIN — GABAPENTIN 300 MG: 300 CAPSULE ORAL at 20:30

## 2022-12-10 RX ADMIN — IPRATROPIUM BROMIDE 0.5 MG: 0.5 SOLUTION RESPIRATORY (INHALATION) at 07:01

## 2022-12-10 RX ADMIN — IPRATROPIUM BROMIDE 0.5 MG: 0.5 SOLUTION RESPIRATORY (INHALATION) at 18:01

## 2022-12-10 RX ADMIN — LEVALBUTEROL HYDROCHLORIDE 1.25 MG: 1.25 SOLUTION RESPIRATORY (INHALATION) at 01:15

## 2022-12-10 RX ADMIN — METHIMAZOLE 5 MG: 5 TABLET ORAL at 09:20

## 2022-12-10 RX ADMIN — MOMETASONE FUROATE AND FORMOTEROL FUMARATE DIHYDRATE 2 PUFF: 200; 5 AEROSOL RESPIRATORY (INHALATION) at 07:01

## 2022-12-10 RX ADMIN — POTASSIUM CHLORIDE 10 MEQ: 7.46 INJECTION, SOLUTION INTRAVENOUS at 01:18

## 2022-12-10 RX ADMIN — IPRATROPIUM BROMIDE 0.5 MG: 0.5 SOLUTION RESPIRATORY (INHALATION) at 01:15

## 2022-12-10 RX ADMIN — ACYCLOVIR 800 MG: 800 TABLET ORAL at 09:20

## 2022-12-10 RX ADMIN — TERBUTALINE SULFATE 0.25 MG: 1 INJECTION, SOLUTION SUBCUTANEOUS at 01:15

## 2022-12-10 RX ADMIN — TIOTROPIUM BROMIDE INHALATION SPRAY 2 PUFF: 3.12 SPRAY, METERED RESPIRATORY (INHALATION) at 07:01

## 2022-12-10 RX ADMIN — GABAPENTIN 300 MG: 300 CAPSULE ORAL at 09:20

## 2022-12-10 RX ADMIN — IPRATROPIUM BROMIDE 0.5 MG: 0.5 SOLUTION RESPIRATORY (INHALATION) at 12:52

## 2022-12-10 RX ADMIN — POTASSIUM CHLORIDE 10 MEQ: 7.46 INJECTION, SOLUTION INTRAVENOUS at 01:17

## 2022-12-10 RX ADMIN — FUROSEMIDE 40 MG: 10 INJECTION, SOLUTION INTRAMUSCULAR; INTRAVENOUS at 01:24

## 2022-12-10 RX ADMIN — CLOPIDOGREL BISULFATE 75 MG: 75 TABLET ORAL at 09:20

## 2022-12-10 RX ADMIN — PANTOPRAZOLE SODIUM 40 MG: 40 TABLET, DELAYED RELEASE ORAL at 20:30

## 2022-12-10 RX ADMIN — ASPIRIN 81 MG: 81 TABLET, CHEWABLE ORAL at 09:20

## 2022-12-10 RX ADMIN — ESCITALOPRAM OXALATE 10 MG: 10 TABLET ORAL at 09:20

## 2022-12-10 RX ADMIN — EZETIMIBE 10 MG: 10 TABLET ORAL at 20:30

## 2022-12-10 RX ADMIN — GABAPENTIN 300 MG: 300 CAPSULE ORAL at 14:47

## 2022-12-10 RX ADMIN — ATORVASTATIN CALCIUM 80 MG: 80 TABLET, FILM COATED ORAL at 20:30

## 2022-12-10 RX ADMIN — PREDNISONE 10 MG: 5 TABLET ORAL at 09:20

## 2022-12-10 ASSESSMENT — PAIN SCALES - GENERAL
PAINLEVEL_OUTOF10: 0
PAINLEVEL_OUTOF10: 4
PAINLEVEL_OUTOF10: 0

## 2022-12-10 NOTE — PROGRESS NOTES
RRT Clinical Rounding Nurse Progress Report      SUBJECTIVE: Called to assess patient secondary to RN/provider concern - worsening SOB and chest pain. PP consult ordered. Vitals:    12/09/22 2001 12/09/22 2020 12/09/22 2315 12/09/22 2330   BP:  111/60 (!) 138/109    Pulse: 67 94 (!) 105 100   Resp: 16 18 25 22   Temp:  97.4 °F (36.3 °C) 98.2 °F (36.8 °C)    TempSrc:  Oral Axillary    SpO2: 97% 91% 93% 93%        DETERIORATION INDEX SCORE: 43    ASSESSMENT:  Pt with worsening SOB and chest pain. O2 sat 93% on 3L NC. Bilateral wheezing and BLE edema noted- NS held for now, Lasix given IV x1, CXR. and BNP ordered. Pt given Atrovent x1 and Xopenex x2 with less wheezing and some improvement in breathing after nebs given. EKG shows new ST depression- troponin pending. Dr Nico Kaye at bedside to assess pt and discussed with Lorenzo Ferro NP. Updated pts  at bedside. Primary RN present. 2am: no improvement in SOB, breathing, or wheezing. Discussed with MD at bedside. Pt placed on Bipap. Plan to remove Bipap in 2 hours and see how pt does. PLAN:  Will follow per RRT Clinical Rounding Program protocol.     Evita Olsen, [address] [facility]: [facility]: [phone]

## 2022-12-10 NOTE — PROGRESS NOTES
Pt w progressive respiratory decline through the night. Pulmonary consulted. EKG without new ST changes, troponin flat, pt too SOB to answer questions. S/P IV lasix. Solu medrol ordered. BIPAP pending. ST on monitor.        JAMES Ramachandran

## 2022-12-10 NOTE — PROGRESS NOTES
RRT Clinical Rounding Nurse Update    Vitals:    12/10/22 0724 12/10/22 0932 12/10/22 1114 12/10/22 1252   BP: (!) 115/56 103/62 123/71    Pulse: 97 (!) 105 98 96   Resp: 22 19   Temp: 98.5 °F (36.9 °C)  97.8 °F (36.6 °C)    TempSrc: Oral  Oral    SpO2: 97%  98% 97%   Weight:            DETERIORATION INDEX SCORE: 36    ASSESSMENT:  Previous outreach assessment was reviewed. There have been no significant changes since previous assessment. O2 saturation 97% on 3L. No wheezes noted on auscultation. Respirations even and unlabored. PLAN:  Will follow per RRT Clinical Rounding Program protocol.     Jennifer Mackey RN  [address] [facility]: [phone]

## 2022-12-10 NOTE — PROGRESS NOTES
2300: Entered the patient's room to administer medications and noticed patient audibly wheezing, anxious, and with increased work of breathing compared to assessment earlier in the shift. Patient requesting breathing treatment. Respiratory called and PRN Albuterol treatment was given at 2330.     2350: Patient with no relief from treatment and very anxious about breathing status. JAMES Gomez informed of situation and 1mg IV Ativan ordered and given. 0020: Patient status still not improving O2 bumped up to 5L NC from 3L and sats in the high 80s. JAMES Gomez updated on patient status and STAT consult to Pulmonology ordered. 0040: Aleshia Pruitt MD at bedside. Verbal orders given for Xopenex, Atrovent, Brethine, Lasix, Solumedrol, Troponin, BMP, CXR, EKG, and ABG ordered. 0200: Patient status slightly improved. BIPAP placed on patient. 0230: Significant improvement in patient status with BIPAP. 0430: Repeat ABG and BIPAP removed. Patient now on 3L NC with improved respiratory status. Patient states feeling much better.

## 2022-12-10 NOTE — PROGRESS NOTES
Noticed patient voided in bed and sheets are wet due to purewick becoming out of place. Offered to clean patient up and provide new linens and gown several times. Patient adamantly refuses stating she \"just wants to rest for now. \" Patient is A&O x4.

## 2022-12-10 NOTE — PROGRESS NOTES
Daughter Quiana Nuñez called for update on mother. Update given. Daughter states she also follows through [address]

## 2022-12-10 NOTE — PROGRESS NOTES
Dual skin assessment completed with Genny Sanchez RN. Pt's skin is warm, dry, fragile, and flaky. Pt has scattered bruising and scars. Pt has shingles rashes on her back that is not open. Pt has a R subclavian port that is accessed. Pt's BLE are swollen with 2+ pitting edema.

## 2022-12-10 NOTE — CONSULTS
PULMONARY/CRITICAL CARE CONSULT NOTE           12/10/2022    Maura Hyde                        Date of Admission:  12/9/2022    The patient's chart is reviewed and the patient is discussed with the staff. Subjective:     Patient is a 67 y.o.  female seen and evaluated at the request of Dr. Fei Lerma. Patient has a past medical history of CAD with 15 stents per , hypertension, hyperlipidemia, non-small cell lung cancer stage III, severe COPD on home O2 2 L around-the-clock, anxiety, depression and hypothyroidism who presented to the emergency department with chest pain and shortness of breath. She has been having worsening dyspnea over the past month, increased her home O2 from 2 to 3 L due to sats in the 80s. She took nitroglycerin several times at home with relief of the chest pain. In the emergency department, she was noted to have an elevated troponin and BNP. She was admitted to the cardiology service and heparin bolus and drip was started. Once transferred to [address] [place] [address], her dyspnea worsened along with wheezing. She was given an albuterol treatment with minimal improvement though she had significant tachycardia in the 140s. [place] [facility] was consulted emergently for further management of her severe dyspnea. Per the nurse, she was tripoding and severely dyspneic this evening.     Review of Systems: Comprehensive ROS negative except in HPI    Current Outpatient Medications   Medication Instructions    acetaminophen (TYLENOL) 325 MG tablet Oral, EVERY 4 HOURS PRN    acyclovir (ZOVIRAX) 800 mg, Oral, 2 TIMES DAILY    albuterol (PROVENTIL) (2.5 MG/3ML) 0.083% nebulizer solution TAKE 3 ML BY NEBULIZATION EVERY 6 HOURS AS NEEDED FOR SHORTNESS OF BREATH    aspirin 81 mg, Oral, DAILY    atorvastatin (LIPITOR) 80 mg, Oral, Nightly, TAKE 1 TABLET BY MOUTH DAILY    azithromycin (ZITHROMAX) 500 MG tablet Oral    budesonide-formoterol (SYMBICORT) 160-4.5 MCG/ACT AERO 2 puffs, Inhalation, 2 TIMES DAILY    cetirizine (ZYRTEC) 10 mg, Oral, DAILY PRN    clopidogrel (PLAVIX) 75 mg, Oral, DAILY    escitalopram (LEXAPRO) 10 mg, Oral, DAILY    ezetimibe (ZETIA) 10 mg, Oral, Nightly    gabapentin (NEURONTIN) 300 mg, Oral, 3 TIMES DAILY    HYDROcodone-acetaminophen (NORCO) 5-325 MG per tablet 2 tablets, Oral, EVERY 6 HOURS PRN, Intended supply: 3 days. Take lowest dose possible to manage pain    ketorolac (TORADOL) 10 mg, Oral, EVERY 6 HOURS PRN    lidocaine-prilocaine (EMLA) 2.5-2.5 % cream Apply topically as needed.     Magic Mouthwash (MIRACLE MOUTHWASH) 5 mLs, Swish & Swallow, 4 TIMES DAILY PRN, Equal parts viscous lidocaine, maalox, nystatin, benadryl    methIMAzole (TAPAZOLE) 5 mg, Oral, DAILY    metoprolol succinate (TOPROL XL) 100 mg, Oral, DAILY    nitroGLYCERIN (NITROSTAT) 0.4 mg, SubLINGual, 3 TIMES DAILY PRN    ondansetron (ZOFRAN ODT) 4 mg, Oral, EVERY 8 HOURS PRN    ondansetron (ZOFRAN) 8 mg, Oral, EVERY 8 HOURS PRN    OXYGEN Inhalation, 2 LPM    pantoprazole (PROTONIX) 40 mg, Oral, 2 TIMES DAILY    predniSONE (DELTASONE) 5 MG tablet     predniSONE (DELTASONE) 10 mg, Oral, DAILY WITH BREAKFAST    prochlorperazine (COMPAZINE) 10 mg, Oral, EVERY 6 HOURS PRN    Pseudoephedrine-guaiFENesin (MUCINEX D PO) Oral, 2 times daily    Roflumilast (DALIRESP) 500 mcg, Oral, NIGHTLY    tiotropium (SPIRIVA RESPIMAT) 2.5 MCG/ACT AERS inhaler 2 puffs, Inhalation, DAILY    torsemide (DEMADEX) 10 mg, Oral, DAILY    traMADol (ULTRAM) 100 mg, Oral, 4 TIMES DAILY, Z51.5 Palliative Care      Past Medical History:   Diagnosis Date    Asthma     Closed right hip fracture ([facility] 02/2022    Coronary artery disease     Former cigarette smoker     Fracture of right femur ([facility] 02/2022    Hyperthyroidism     Lung mass     Multiple closed anterior-posterior compression fractures of pelvis (HCC) 02/2022    Non-small cell lung cancer (HCC)     Severe chronic obstructive pulmonary disease ([facility]     Subdural hematoma 2022    Thyroid nodule      Past Surgical History:   Procedure Laterality Date    BRONCHOSCOPY N/A 2022    BRONCHOSCOPY ENDOBRONCHIAL ULTRASOUND performed by Aniceto Loredo MD at [facility] ENDOSCOPY    CAROTID ENDARTERECTOMY Right     CATARACT REMOVAL Bilateral     CORONARY STENT PLACEMENT  2022    HIP FRACTURE SURGERY Right 2022    IR PORT PLACEMENT EQUAL OR GREATER THAN 5 YEARS  2022    IR PORT PLACEMENT EQUAL OR GREATER THAN 5 YEARS 2022 [facility]    WISDOM TOOTH EXTRACTION      as a teenager     Social History     Socioeconomic History    Marital status:      Spouse name: Not on file    Number of children: Not on file    Years of education: Not on file    Highest education level: Not on file   Occupational History    Not on file   Tobacco Use    Smoking status: Former     Packs/day: 0.50     Years: 50.00     Pack years: 25.00     Types: Cigarettes     Quit date:      Years since quittin.9    Smokeless tobacco: Never   Substance and Sexual Activity    Alcohol use: Not Currently    Drug use: Not on file    Sexual activity: Not on file   Other Topics Concern    Not on file   Social History Narrative    Not on file     Social Determinants of Health     Financial Resource Strain: Not on file   Food Insecurity: Not on file   Transportation Needs: Not on file   Physical Activity: Not on file   Stress: Not on file   Social Connections: Not on file   Intimate Partner Violence: Not on file   Housing Stability: Not on file     Family History   Adopted: Yes   Problem Relation Age of Onset    No Known Problems Mother     No Known Problems Father     Thyroid Disease Daughter      Allergies   Allergen Reactions    Promethazine Nausea And Vomiting and Other (See Comments)     Severe vomiting      Objective:   Blood pressure (!) 165/84, pulse (!) 136, temperature 98.2 °F (36.8 °C), temperature source Axillary, resp. rate 28, SpO2 99 %.  No intake or output data in the 24 hours ending 12/10/22 0322  PHYSICAL EXAM   Constitutional: Thin white female who appears in respiratory distress. She is able to answer questions in 2 word sentences. EENMT:  Sclera clear, pupils equal, oral mucosa moist  Respiratory: symmetric chest rise. Significant expiratory wheezes superiorly  Cardiovascular: Tachycardia without M,G,R. There is 3+ lower extremity edema. Gastrointestinal: soft and non-tender; with positive bowel sounds. Musculoskeletal: warm without cyanosis. Normal muscle tone. Skin:  no jaundice or rashes, no wounds   Neurologic: symmetric strength, fluent speech  Psychiatric:  calm, appropriate, oriented x 4    Imaging: I performed an independent interpretation of the patient's images. CXR: COPD, no CHF or pneumonia    CT Chest: No PE, lesions consistent with scattered lung cancer    Recent Labs     12/09/22  1233 12/09/22  2005 12/10/22  0100   WBC 6.5  --   --    HGB 8.8* 8.8*  --    HCT 27.0* 27.4*  --      --   --      --   --    K 3.3*  --   --    CL 95*  --   --    CO2 24  --   --    BUN 11  --   --    CREATININE 0.56*  --   --    BILITOT 0.4  --   --    AST 18  --   --    ALT 5*  --   --    ALKPHOS 56  --   --    TROPHS  --  113.1* 123.6*   NTPROBNP  --   --  3,073*     ECHO: No results found for this or any previous visit. MICRO:   Recent Labs     12/09/22  1233   CULTURE PENDING     Assessment and Plan:  (Medical Decision Making)   Principal Problem:    Chest pain  Plan: Repeat EKG and troponin along with BNP. Initial troponin elevated at 113, heparin drip is going.  mg and Nitropaste. Active Problems:    Dyspnea  Plan: No improvement after approximately 1 hour of Xopenex and Atrovent. Solu-Medrol 125 mg IV and terbutaline 0.25 mg subcu given. Patient placed on BiPAP with significant improvement in her mental status. She is now more alert. We will continue on BiPAP and reevaluate in 2 hours.     Shingles  Plan: Deferred Non-small cell lung cancer ([facility]  Plan: Defer to oncology    Coronary artery disease involving native coronary artery of native heart without angina pectoris  Plan: Continue to trend troponin, repeat EKG as needed. Continue heparin. Full Code    More than 50% of the time documented was spent in face-to-face contact with the patient and in the care of the patient on the floor/unit where the patient is located. Total time spent at bedside was 60 minutes. Thank you very much for this referral.  We appreciate the opportunity to participate in this patient's care. Will follow along with above stated plan.     Zeeshan Ordoñez MD

## 2022-12-10 NOTE — PROGRESS NOTES
12/10/22 0200   NIV Type   $NIV $Daily Charge   Skin Assessment Clean, dry, & intact   Equipment Type v60   Mode Bilevel   Mask Type Other (Comment)   Mask Size Medium   Settings/Measurements   PIP Observed 14 cm H20   IPAP 13 cmH20   CPAP/EPAP 8 cmH2O   Vt (Measured) 483 mL   Rate Ordered 10   Resp 28   Insp Rise Time (%) 4 %   FiO2  40 %   I Time/ I Time % 0.9 s   Minute Volume (L/min) 13.3 Liters   Mask Leak (lpm) 0 lpm   Comfort Level Good   Using Accessory Muscles Yes   SpO2 99   Patient's Home Machine No   Alarm Settings   Alarms On Y   Low Pressure (cmH2O) 5 cmH2O   High Pressure (cmH2O) 35 cmH2O   Apnea (secs) 20 secs   RR Low (bpm) 8   RR High (bpm) 50 br/min

## 2022-12-10 NOTE — PROGRESS NOTES
DNR (do not resuscitate)    Hyperlipidemia    Moderate to severe mitral regurgitation    Depression  Resolved Problems:    * No resolved hospital problems. *    A/P  1) chest pain seems consistent with episodes of hypoxic respiratory failure. I do not feel that these represent acute exacerbations of heart failure but rather is likely related to her worsening lung cancer. She did not respond to IV Lasix. Dynamic EKG changes including ST depressions diffusely in the setting of hypoxia is consistent with demand ischemia due to hypoxic respiratory failure. I do not feel she is a cath candidate at this time nor do I feel she is having an acute coronary syndrome. 2) Resp failure -has responded to Solu-Medrol, pulmonology following oncology to see her due to changes on chest CT. 3) Elevated troponin -not consistent with acute coronary syndrome likely related to demand ischemia no invasive work-up is planned.   4) Echo pending today    Kate Yu MD  12/10/2022 9:31 AM

## 2022-12-10 NOTE — CONSULTS
History and Physical Initial Visit NOTE           12/10/2022    Stiven Hyde                        Date of Admission:  12/9/2022    The patient's chart is reviewed and the patient is discussed with the staff. Subjective:   Patient is a 67 y.o.  female seen and evaluated at the request of Dr. Yony Mcelroy. Patient has a past medical history of CAD with 15 stents per , hypertension, hyperlipidemia, non-small cell lung cancer stage III, severe COPD on home O2 2 L around-the-clock, anxiety, depression and hypothyroidism who presented to the emergency department with chest pain and shortness of breath. She has been having worsening dyspnea over the past month, increased her home O2 from 2 to 3 L due to sats in the 80s. She took nitroglycerin several times at home with relief of the chest pain. In the emergency department, she was noted to have an elevated troponin and BNP. She was admitted to the cardiology service and heparin bolus and drip was started. Once transferred to [facility], her dyspnea worsened along with wheezing. She was given an albuterol treatment with minimal improvement though she had significant tachycardia in the 140s. [place] pulmonary was consulted emergently for further management of her severe dyspnea. Per the nurse, she was tripoding and severely dyspneic this evening. She has a history of very severe COPD with centrilobular emphysema, on nocturnal O2. She had a navigation bronchoscopy with attempts at biopsy of a JHONY nodule 2/11/20 which was difficult to visualize with radial probe and ultimately was non diagnostic. PET scan showed this area to have an SUV of 2.1. She was to have a bronchoscopy attempt at [facility] in July but repeat imaging performed here showed improvement in this area so the bronchoscopy has been cancelled.  Was referred to [facility] for eval for lung transplant ultimately felt not to be a lung transplant candidate. Was then seen by Dr Jatinder Robins with [address] and also felt not to be a transplant candidate. June 2022 she had a CT scan with increase LLL mass and L hilar lymph node. She had bronch with EBUS and nic showing adenocarcinoma in 11R. Stage III. She was seen by heme/onc. She has completed chemo/radiation and states that she tolerated this well. Pt sees us in the office for very severe COPD with chronic hypoxic respiratory failure. Deemed not a transplant candidate by [address] and [facility]. Dx with stage III lung cancer in 2022. Completed chemo and radiation, on immunotherapy now. Attempting to get back into pulm rehab. Takes Symbicort, spiriva, daliresp, prn albuterol at home and wears 2-3 L O2. Pt seen overnight by Dr. Alba Anguiano for increasing dyspnea and hypoxia. Pt now stable on 3L NC. Pt sitting up in bed talking on phone to family. Pt states she still feels \"out of it\", but her breathing is much better from last night. Pt states she is supposed to have cardiac rehab in a few weeks, but doesn't think she'll be well enough to attend. Pt states she wishes we could \"wave a magic wand and have her back walking around on 2 liters\".     Review of Systems: Comprehensive ROS negative except in HPI    Current Outpatient Medications   Medication Instructions    acetaminophen (TYLENOL) 325 MG tablet Oral, EVERY 4 HOURS PRN    acyclovir (ZOVIRAX) 800 mg, Oral, 2 TIMES DAILY    albuterol (PROVENTIL) (2.5 MG/3ML) 0.083% nebulizer solution TAKE 3 ML BY NEBULIZATION EVERY 6 HOURS AS NEEDED FOR SHORTNESS OF BREATH    aspirin 81 mg, Oral, DAILY    atorvastatin (LIPITOR) 80 mg, Oral, Nightly, TAKE 1 TABLET BY MOUTH DAILY    azithromycin (ZITHROMAX) 500 MG tablet Oral    budesonide-formoterol (SYMBICORT) 160-4.5 MCG/ACT AERO 2 puffs, Inhalation, 2 TIMES DAILY    cetirizine (ZYRTEC) 10 mg, Oral, DAILY PRN    clopidogrel (PLAVIX) 75 mg, Oral, DAILY    escitalopram (LEXAPRO) 10 mg, Oral, DAILY    ezetimibe (ZETIA) 10 mg, Oral, Nightly gabapentin (NEURONTIN) 300 mg, Oral, 3 TIMES DAILY    HYDROcodone-acetaminophen (NORCO) 5-325 MG per tablet 2 tablets, Oral, EVERY 6 HOURS PRN, Intended supply: 3 days. Take lowest dose possible to manage pain    ketorolac (TORADOL) 10 mg, Oral, EVERY 6 HOURS PRN    lidocaine-prilocaine (EMLA) 2.5-2.5 % cream Apply topically as needed.     Magic Mouthwash (MIRACLE MOUTHWASH) 5 mLs, Swish & Swallow, 4 TIMES DAILY PRN, Equal parts viscous lidocaine, maalox, nystatin, benadryl    methIMAzole (TAPAZOLE) 5 mg, Oral, DAILY    metoprolol succinate (TOPROL XL) 100 mg, Oral, DAILY    nitroGLYCERIN (NITROSTAT) 0.4 mg, SubLINGual, 3 TIMES DAILY PRN    ondansetron (ZOFRAN ODT) 4 mg, Oral, EVERY 8 HOURS PRN    ondansetron (ZOFRAN) 8 mg, Oral, EVERY 8 HOURS PRN    OXYGEN Inhalation, 2 LPM    pantoprazole (PROTONIX) 40 mg, Oral, 2 TIMES DAILY    predniSONE (DELTASONE) 5 MG tablet     predniSONE (DELTASONE) 10 mg, Oral, DAILY WITH BREAKFAST    prochlorperazine (COMPAZINE) 10 mg, Oral, EVERY 6 HOURS PRN    Pseudoephedrine-guaiFENesin (MUCINEX D PO) Oral, 2 times daily    Roflumilast (DALIRESP) 500 mcg, Oral, NIGHTLY    tiotropium (SPIRIVA RESPIMAT) 2.5 MCG/ACT AERS inhaler 2 puffs, Inhalation, DAILY    torsemide (DEMADEX) 10 mg, Oral, DAILY    traMADol (ULTRAM) 100 mg, Oral, 4 TIMES DAILY, Z51.5 Palliative Care      Past Medical History:   Diagnosis Date    Asthma     Closed right hip fracture ([facility] 02/2022    Coronary artery disease     Former cigarette smoker     Fracture of right femur ([facility] 02/2022    Hyperthyroidism     Lung mass     Multiple closed anterior-posterior compression fractures of pelvis ([facility] 02/2022    Non-small cell lung cancer (HCC)     Severe chronic obstructive pulmonary disease (HCC)     Subdural hematoma 03/2022    Thyroid nodule      Past Surgical History:   Procedure Laterality Date    BRONCHOSCOPY N/A 07/20/2022    BRONCHOSCOPY ENDOBRONCHIAL ULTRASOUND performed by Jj Andrade MD at [facility] ENDOSCOPY CAROTID ENDARTERECTOMY Right     CATARACT REMOVAL Bilateral     CORONARY STENT PLACEMENT  2022    HIP FRACTURE SURGERY Right 2022    IR PORT PLACEMENT EQUAL OR GREATER THAN 5 YEARS  2022    IR PORT PLACEMENT EQUAL OR GREATER THAN 5 YEARS 2022 [facility]    WISDOM TOOTH EXTRACTION      as a teenager     Social History     Socioeconomic History    Marital status:      Spouse name: Not on file    Number of children: Not on file    Years of education: Not on file    Highest education level: Not on file   Occupational History    Not on file   Tobacco Use    Smoking status: Former     Packs/day: 0.50     Years: 50.00     Pack years: 25.00     Types: Cigarettes     Quit date:      Years since quittin.9    Smokeless tobacco: Never   Substance and Sexual Activity    Alcohol use: Not Currently    Drug use: Not on file    Sexual activity: Not on file   Other Topics Concern    Not on file   Social History Narrative    Not on file     Social Determinants of Health     Financial Resource Strain: Not on file   Food Insecurity: Not on file   Transportation Needs: Not on file   Physical Activity: Not on file   Stress: Not on file   Social Connections: Not on file   Intimate Partner Violence: Not on file   Housing Stability: Not on file     Family History   Adopted: Yes   Problem Relation Age of Onset    No Known Problems Mother     No Known Problems Father     Thyroid Disease Daughter      Allergies   Allergen Reactions    Promethazine Nausea And Vomiting and Other (See Comments)     Severe vomiting      Objective:   Blood pressure 123/71, pulse 98, temperature 98.5 °F (36.9 °C), temperature source Oral, resp. rate 22, weight 130 lb 8 oz (59.2 kg), SpO2 98 %.    Intake/Output Summary (Last 24 hours) at 12/10/2022 1139  Last data filed at 12/10/2022 0524  Gross per 24 hour   Intake --   Output 500 ml   Net -500 ml       PHYSICAL EXAM   Constitutional:  the patient is well developed and in no acute distress  EENMT:  Sclera clear, pupils equal, oral mucosa moist  Respiratory: symmetric chest rise. Diminished bilaterally with no wheezes noted. Cardiovascular:  RRR without M,G,R. There is 1+ lower extremity edema. Gastrointestinal: soft and non-tender; with positive bowel sounds. Musculoskeletal: warm without cyanosis. Normal muscle tone. Skin:  no jaundice or rashes, no wounds   Neurologic: symmetric strength, fluent speech  Psychiatric:  calm, appropriate, oriented x 4    Imaging: I performed an independent interpretation of the patient's images. CXR: 12/10                                                                     12/9             CT chest:     Lab:    Recent Labs     12/09/22  1233 12/09/22  2005 12/10/22  0100 12/10/22  0513   WBC 6.5  --   --  7.8   HGB 8.8* 8.8*  --  8.6*   HCT 27.0* 27.4*  --  27.8*     --   --  196     --   --  134   K 3.3*  --   --  3.6   CL 95*  --   --  101   CO2 24  --   --  24   BUN 11  --   --  11   CREATININE 0.56*  --   --  0.70   MG  --   --   --  1.8   BILITOT 0.4  --   --   --    AST 18  --   --   --    ALT 5*  --   --   --    ALKPHOS 56  --   --   --    TROPHS  --  113.1* 123.6*  --    NTPROBNP  --   --  3,073*  --          Lab Results   Component Value Date/Time     12/10/2022 05:13 AM    K 3.6 12/10/2022 05:13 AM     12/10/2022 05:13 AM    CO2 24 12/10/2022 05:13 AM    BUN 11 12/10/2022 05:13 AM    CREATININE 0.70 12/10/2022 05:13 AM    GLUCOSE 184 12/10/2022 05:13 AM    CALCIUM 8.8 12/10/2022 05:13 AM        Lab Results   Component Value Date    BNP 2,917 (H) 03/25/2022       ECHO:    Left Ventricle: Left ventricle size is normal. Normal wall thickness. Normal wall motion. Low normal left ventricular systolic function. EF by 2D Simpsons Biplane is 52%. Normal diastolic function. Aortic Valve: Mildly thickened cusps. Mitral Valve: Mildly thickened leaflets. Mildly calcified anterior leaflet.  Moderate to severe transvalvular regurgitation with an eccentrically directed jet and may underestimate severity. Transesophageal echo  recommended for further evaluation of potentially severe mitral regurgitation. Aorta: Dilated annulus. MICRO:   Recent Labs     12/09/22  1233   CULTURE PENDING       Assessment and Plan:  (Medical Decision Making)   Principal Problem:    Chest pain  Plan: Repeat EKG and troponin along with BNP. Initial troponin elevated at 113, heparin drip is going.  mg and Nitropaste. Per cards, elevated troponin not consistent with acute coronary syndrome likely related to demand ischemia no invasive work-up is planned. Active Problems:    Dyspnea  Plan: No improvement after approximately 1 hour of Xopenex and Atrovent. Solu-Medrol 125 mg IV and terbutaline 0.25 mg subcu given. Patient placed on BiPAP with significant improvement in her mental status. She is now more alert. We will continue on BiPAP and reevaluate in 2 hours. Revaluate pt at 0430 per RN and repeat ABG much better and Bipap removed. Pt on 3L NC. Pt much less dyspneic this morning, but still with shortness of breath on exertion or talking. Shingles  Plan: Per OP onc, prophylactic acyclovir for 3 months. Non-small cell lung cancer ([facility]  Plan: Onc consult pending; currently on durvalumab immunotherapy monthly. Lung ca seems to be progressing with increasing growth of tumors. Essential hypertension, benign  Plan: Per primary, stable       Coronary artery disease involving native coronary artery of native heart without angina pectoris  Plan: Continue to trend troponin, repeat EKG as needed. Continue heparin. Echo pending today. Chronic respiratory failure with hypoxia ([address]  Plan: On 3L NC. Immunotherapy durvalumab does pose risk of pneumonitis? Continue solumedrol 125 mg daily. COPD, very severe ([address]  Plan: Continue spiriva, symbicort, daliresp, and albuterol prn. On 3L NC.           Full Code    Thank you very much for this referral.  We appreciate the opportunity to participate in this patient's care. Will follow along with above stated plan. In this split/shared evaluation I performed performed a medically appropriate history and exam, counseled and educated the patient and/or family member, ordered medications, tests or procedures, documented information in EMR, and coordinated care. which accounted for 10 clinical time.      Kellen Muñoz MD

## 2022-12-10 NOTE — PROGRESS NOTES
Antonio Cardenas 62 [address]  Admission Date: 12/9/2022         Daily Progress Note: 12/10/2022    The patient's chart is reviewed and the patient is discussed with the staff. Background: Patient is a 67 y.o.  female seen and evaluated at the request of Dr. Kathleen Tavares. Patient has a past medical history of CAD with 15 stents per , hypertension, hyperlipidemia, non-small cell lung cancer stage III, severe COPD on home O2 2 L around-the-clock, anxiety, depression and hypothyroidism who presented to the emergency department with chest pain and shortness of breath. She has been having worsening dyspnea over the past month, increased her home O2 from 2 to 3 L due to sats in the 80s. She took nitroglycerin several times at home with relief of the chest pain. In the emergency department, she was noted to have an elevated troponin and BNP. She was admitted to the cardiology service and heparin bolus and drip was started. Once transferred to [facility], her dyspnea worsened along with wheezing. She was given an albuterol treatment with minimal improvement though she had significant tachycardia in the 140s. [place] [facility] was consulted emergently for further management of her severe dyspnea. Per the nurse, she was tripoding and severely dyspneic this evening. Subjective:   Pt now sitting in bed talking on phone to family. Pt states she still feels \"out of it\", but her breathing is much better from last night. Pt states she is supposed to have cardiac rehab in a few weeks, but doesn't think she'll be well enough to attend. Pt states she wishes we could \"wave a magic wand and have her back walking around on 2 liters\".   Current Facility-Administered Medications   Medication Dose Route Frequency    levalbuterol (XOPENEX) nebulizer solution 1.25 mg  1.25 mg Nebulization Q4H PRN    ipratropium (ATROVENT) 0.02 % nebulizer solution 0.5 mg  0.5 mg Nebulization TID methylPREDNISolone sodium (SOLU-MEDROL) injection 125 mg  125 mg IntraVENous Daily    acetaminophen (TYLENOL) tablet 650 mg  650 mg Oral Q4H PRN    acyclovir (ZOVIRAX) tablet 800 mg  800 mg Oral BID    albuterol (PROVENTIL) nebulizer solution 2.5 mg  2.5 mg Nebulization Q4H PRN    aspirin chewable tablet 81 mg  81 mg Oral Daily    atorvastatin (LIPITOR) tablet 80 mg  80 mg Oral Nightly    mometasone-formoterol (DULERA) 200-5 MCG/ACT inhaler 2 puff  2 puff Inhalation BID    cetirizine (ZYRTEC) tablet 10 mg  10 mg Oral Daily PRN    clopidogrel (PLAVIX) tablet 75 mg  75 mg Oral Daily    escitalopram (LEXAPRO) tablet 10 mg  10 mg Oral Daily    ezetimibe (ZETIA) tablet 10 mg  10 mg Oral Nightly    gabapentin (NEURONTIN) capsule 300 mg  300 mg Oral TID    HYDROcodone-acetaminophen (NORCO) 5-325 MG per tablet 2 tablet  2 tablet Oral Q6H PRN    magic (miracle) mouthwash with nystatin  5 mL Swish & Swallow 4x Daily PRN    methIMAzole (TAPAZOLE) tablet 5 mg  5 mg Oral Daily    metoprolol succinate (TOPROL XL) extended release tablet 100 mg  100 mg Oral Daily    ondansetron (ZOFRAN-ODT) disintegrating tablet 4 mg  4 mg Oral Q8H PRN    pantoprazole (PROTONIX) tablet 40 mg  40 mg Oral BID    predniSONE (DELTASONE) tablet 10 mg  10 mg Oral Daily with breakfast    Roflumilast (DALIRESP) tablet 500 mcg (Patient Supplied)  500 mcg Oral Nightly    tiotropium (SPIRIVA RESPIMAT) 2.5 MCG/ACT inhaler 2 puff  2 puff Inhalation Daily    [Held by provider] torsemide (DEMADEX) tablet 10 mg  10 mg Oral Daily    traMADol (ULTRAM) tablet 100 mg  100 mg Oral 4x daily    sodium chloride flush 0.9 % injection 5-40 mL  5-40 mL IntraVENous 2 times per day    sodium chloride flush 0.9 % injection 5-40 mL  5-40 mL IntraVENous PRN    0.9 % sodium chloride infusion   IntraVENous PRN    polyethylene glycol (GLYCOLAX) packet 17 g  17 g Oral Daily PRN    potassium chloride (KLOR-CON M) extended release tablet 40 mEq  40 mEq Oral PRN    Or    potassium bicarb-citric acid (EFFER-K) effervescent tablet 40 mEq  40 mEq Oral PRN    Or    potassium chloride 10 mEq/100 mL IVPB (Peripheral Line)  10 mEq IntraVENous PRN    magnesium sulfate 2000 mg in 50 mL IVPB premix  2,000 mg IntraVENous PRN    ondansetron (ZOFRAN) injection 4 mg  4 mg IntraVENous Q6H PRN    aluminum & magnesium hydroxide-simethicone (MAALOX) 200-200-20 MG/5ML suspension 30 mL  30 mL Oral Q6H PRN    nitroglycerin (NITRO-BID) 2 % ointment 1 inch  1 inch Topical 4 times per day    nitroGLYCERIN (NITROSTAT) SL tablet 0.4 mg  0.4 mg SubLINGual Q5 Min PRN    0.9 % sodium chloride infusion   IntraVENous Continuous    azithromycin (ZITHROMAX) tablet 500 mg  500 mg Oral Once per day on Mon Wed Fri    guaiFENesin [facility]) extended release tablet 600 mg  600 mg Oral BID    heparin (porcine) injection 3,540 Units  60 Units/kg IntraVENous PRN    heparin (porcine) injection 1,770 Units  30 Units/kg IntraVENous PRN    heparin 25,000 units in dextrose 5% 250 mL (premix) infusion  5-30 Units/kg/hr IntraVENous Continuous    LORazepam (ATIVAN) injection 1 mg  1 mg IntraVENous Q6H PRN     Review of Systems: Comprehensive ROS negative except in HPI  Objective:   Blood pressure 103/62, pulse (!) 105, temperature 98.5 °F (36.9 °C), temperature source Oral, resp. rate 22, weight 130 lb 8 oz (59.2 kg), SpO2 97 %. Intake/Output Summary (Last 24 hours) at 12/10/2022 3935  Last data filed at 12/10/2022 0524  Gross per 24 hour   Intake --   Output 500 ml   Net -500 ml     Physical Exam:   Constitutional:  the patient is well developed and in no acute distress  EENMT:  Sclera clear, pupils equal, oral mucosa moist  Respiratory: symmetric chest rise. Diminished bilaterally with no wheezes noted. Cardiovascular:  RRR without M,G,R. There is 1+ lower extremity edema. Gastrointestinal: soft and non-tender; with positive bowel sounds. Musculoskeletal: warm without cyanosis. Normal muscle tone.    Skin:  no jaundice or rashes, no wounds   Neurologic: symmetric strength, fluent speech  Psychiatric:  calm, appropriate, oriented x 4    Imaging: I performed an independent interpretation of the patient's images. CXR: 12/10     12/9     CT chest:          Lab:  Recent Labs     12/09/22  1233 12/09/22  2005 12/10/22  0513   WBC 6.5  --  7.8   HGB 8.8* 8.8* 8.6*   HCT 27.0* 27.4* 27.8*     --  196   PROCAL 0.15  --   --      Recent Labs     12/09/22  1233 12/10/22  0513    134   K 3.3* 3.6   CL 95* 101   CO2 24 24   BUN 11 11   CREATININE 0.56* 0.70   MG  --  1.8   BILITOT 0.4  --    AST 18  --    ALT 5*  --    ALKPHOS 56  --      Recent Labs     12/09/22  2005 12/10/22  0100   TROPHS 113.1* 123.6*   NTPROBNP  --  3,073*     Recent Labs     12/09/22  1233 12/10/22  0513   GLUCOSE 126* 184*      Microbiology:   Recent Labs     12/09/22  1233   CULTURE PENDING     ECHO: No results found for this or any previous visit. Assessment and Plan:  (Medical Decision Making)   Principal Problem:    Chest pain  Plan: Repeat EKG and troponin along with BNP. Initial troponin elevated at 113, heparin drip is going.  mg and Nitropaste. Per cards, elevated troponin not consistent with acute coronary syndrome likely related to demand ischemia no invasive work-up is planned. Active Problems:    Dyspnea  Plan: No improvement after approximately 1 hour of Xopenex and Atrovent. Solu-Medrol 125 mg IV and terbutaline 0.25 mg subcu given. Patient placed on BiPAP with significant improvement in her mental status. She is now more alert. We will continue on BiPAP and reevaluate in 2 hours. Revaluate pt at 0430 per RN and repeat ABG much better and Bipap removed. Pt on 3L NC. Pt much less dyspneic this morning, but still with shortness of breath on exertion or talking.       Shingles  Plan: Deferred      Non-small cell lung cancer [facility])  Plan: Oncology consult pending      Coronary artery disease involving native coronary artery of native heart without angina pectoris  Plan: Continue to trend troponin, repeat EKG as needed. Continue heparin. Echo pending today. More than 50% of the time documented was spent in face-to-face contact with the patient and in the care of the patient on the floor/unit where the patient is located. In this split/shared evaluation I performed performed a medically appropriate history and exam, counseled and educated the patient and/or family member, ordered medications, tests or procedures, documented information in EMR, and coordinated care. which accounted for 10 minutes of clinical time.      YISEL Kothari - NP

## 2022-12-10 NOTE — CARE COORDINATION
Pt presented to the ED at [facility] c/o CP and SOB. Has a PMHx of CAD, HTN, HLD, MR, NSCLC stg 3, severe COPD, depression, anxiety, hyperthyroidism, EZRA, chronic hypoxic respiratory failure and recent shingles. Pt lives with her ex- in a one-story private residence. At baseline, is fairly indep with her ADLs. Uses a cane and RW, also has a SS at home. Pt on 3L supplemental, [facility] supplies home oxygen. Has had [facility] in the past, no current home services. PCP confirmed. Insurance verified. Able to afford home meds. No discharge needs identified but will remain available for potential dcn. 12/10/22 6415   Service Assessment   Patient Orientation Alert and Oriented   Cognition Alert   History Provided By Patient   Primary Caregiver Self   Support Systems Spouse/Significant Other;Children;Family Members;[employer] [employer];Friends/Neighbors   PCP Verified by CM Yes  Daphney Andersen)   Prior Functional Level Independent in ADLs/IADLs   Current Functional Level Independent in ADLs/IADLs   Can patient return to prior living arrangement Yes   Ability to make needs known: Good   Family able to assist with home care needs: Yes   Would you like for me to discuss the discharge plan with any other family members/significant others, and if so, who? Yes   Financial Resources Medicare   Social/Functional History   Lives With Spouse   Type of [address] One level   Home Equipment Cane;Walker, rolling;Oxygen   Receives Help From Family   ADL Assistance Independent   Ambulation Assistance Needs assistance   Occupation Retired   Discharge Planning   Type of [address] Prior To Admission None   Potential Assistance Needed N/A   DME Ordered?  Other (comment)   Potential Assistance Purchasing Medications No   Type of Home Care Services None   Patient expects to be discharged to: [facility] Discharge   Transition of Care Consult (CM Consult) Discharge [facility] 1690 Discharge None   [employer] Information Provided?  No   Mode of Transport at Discharge Other (see comment)  (Family)   Confirm Follow Up Transport Family

## 2022-12-10 NOTE — PROGRESS NOTES
RRT Clinical Rounding Nurse Update    Vitals:    12/10/22 0200 12/10/22 0421 12/10/22 0422 12/10/22 0518   BP:    139/75   Pulse: (!) 136   93   Resp: 28 25 22   Temp:    98 °F (36.7 °C)   TempSrc:    Oral   SpO2: 99% 99% 95% 93%   Weight:    130 lb 8 oz (59.2 kg)        DETERIORATION INDEX SCORE: 42    ASSESSMENT:  Previous outreach assessment was reviewed. There have been no significant changes since previous assessment. Pt taken off Bipap and doing well on 3L NC. PLAN:  Will follow per RRT Clinical Rounding Program protocol.     Evita Olsen, [address]: [facility]: [phone]

## 2022-12-10 NOTE — PROGRESS NOTES
RRT Clinical Rounding Nurse Progress Report      SUBJECTIVE: Called to assess patient secondary to RN/provider concern - SOB/increased O2 demands. Vitals:    12/10/22 0422 12/10/22 0518 12/10/22 0702 12/10/22 0724   BP:  139/75  (!) 115/56   Pulse:  93 (!) 107 97   Resp:  22 22 22   Temp:  98 °F (36.7 °C)  98.5 °F (36.9 °C)   TempSrc:  Oral  Oral   SpO2: 95% 93% 97% 97%   Weight:  130 lb 8 oz (59.2 kg)          DETERIORATION INDEX SCORE: 40    ASSESSMENT:  Patient is alert and oriented. Denies pain or SOB. States she only experiences SOB with exertion. Bilateral lung sounds diminished. Respirations even and unlabored. Currently on 3L NC with O2 saturation of 98%. Patient denies any needs or concerns at this time. No concerns per primary RN. PLAN:  Will follow per RRT Clinical Rounding Program protocol.     Puneet Hickey, RN  [address] [facility], [place]: [facility]: [phone]

## 2022-12-10 NOTE — H&P
[facility] CARDIOLOGY   History & Physical                 Primary Cardiologist: Dr. Deana Witt    Primary Care Physician: Dr. Garth Atkinson    Admitting Physician: Dr. Mirella Goins:     Patient is a 67 y.o.  female with PMHx of CAD, HTN, HLD, MR, NSCLC Stg 3, severe COPD, Depression, Anxiety, Hyperthyroidism, EZRA, chronic hypoxic respiratory failure on home O2 and recent shingles who presented to the ED at [facility] with c/o CP and SOB. States she has been noted worsening SOB over the past month. Has had to turn her O2 up from 2->3L due to O2 sats of 80. Reports today she started having CPs. Located mid-chest, substernal, radiated around under both breasts and to her sides and armpits. Describes as burning and uncomfortable but not sharp or shooting. Reports broke out in shingles 2 weeks ago around her L flank and around under her breast. She states she took some NTG with relief of the CP. However, it would return and she ended up taking 4 tabs prior to coming to the ED. States her breathing has been \"terrible. \" Reports despite O2 she has been very dyspneic. Reports s/s similar to those with prior CV events but also different. She states that she finished her chemo/rad therapy 6 weeks ago and is going for immunotherapy. Reports CA downgraded post chemo/rad from stg 4 to stg 3. Denies any CP prior to today. Did have fever of 100F PTA and took some Tylenol. In the ED: Pt given 324mg ASA, Norco, Morphine, Zofran, NTG paste, IV Heparin bolus +gtt as well as 1600cc NS. She was noted to have dynamic EKG changes and was transferred to [facility] for further Cardiology evaluation. Cardiac Hx:  [facility] 2/7/22 with two vessel complex PCI (diagonal dissection had to be covered with two 5 x 22 stent.  Leading edge of the stent into the LAD a 3.0 x 12 and POBA to dRCA by Dr. Deana Witt),     Past Medical History:   Diagnosis Date    Asthma     Closed right hip fracture ([facility] [address] 02/2022    Coronary artery disease     Former cigarette smoker Fracture of right femur ([facility] [address] 2022    Hyperthyroidism     Lung mass     Multiple closed anterior-posterior compression fractures of pelvis ([facility] [address] 2022    Non-small cell lung cancer ([facility] [address]     Severe chronic obstructive pulmonary disease ([facility] [address]     Subdural hematoma 2022    Thyroid nodule       Past Surgical History:   Procedure Laterality Date    BRONCHOSCOPY N/A 2022    BRONCHOSCOPY ENDOBRONCHIAL ULTRASOUND performed by Kelly Sorto MD at [facility] ENDOSCOPY    CAROTID ENDARTERECTOMY Right     CATARACT REMOVAL Bilateral     CORONARY STENT PLACEMENT  2022    HIP FRACTURE SURGERY Right 2022    IR PORT PLACEMENT EQUAL OR GREATER THAN 5 YEARS  2022    IR PORT PLACEMENT EQUAL OR GREATER THAN 5 YEARS 2022 [facility] SPECIALS    WISDOM TOOTH EXTRACTION      as a teenager      Allergies   Allergen Reactions    Promethazine Nausea And Vomiting and Other (See Comments)     Severe vomiting      Social History     Tobacco Use    Smoking status: Former     Packs/day: 0.50     Years: 50.00     Pack years: 25.00     Types: Cigarettes     Quit date:      Years since quittin.9    Smokeless tobacco: Never   Substance Use Topics    Alcohol use: Not Currently      FH:   Family History   Adopted: Yes   Problem Relation Age of Onset    No Known Problems Mother     No Known Problems Father     Thyroid Disease Daughter         Review of Systems  General: no recent weight change, no weakness, no fatigue, +fever, no chills, no diaphoresis, no change in appetite or ADLs  Skin: +healing shingles rash to L flank  HEENT: no headache, dizziness, lightheadedness, vision changes, hearing changes, sinus pressure/pain/congestion, bleeding gums or sore throat  Neck: no swollen glands, goiter, pain or stiffness  Respiratory: no cough, no congestion, no sputum, no hemoptysis, +dyspnea, no wheezing  Cardiovascular: + as per HPI, no palpitations, syncope, orthopnea, PND  Gastrointestinal: no increased reflux, no constipation, diarrhea, liver problems, GI bleeding, no abdominal pain or distension, no N/V  Urinary: no frequency, urgency, hematuria, burning/pain with urination or difficulty urinating  Peripheral Vascular: no claudication, leg cramps, prior DVTs, no swelling of BLE, no color change, or swelling with redness or tenderness  Musculoskeletal: no new muscle or joint pain/stiffness, joint swelling, erythema of joints, or back pain  Psychiatric: no increased depression, anxiety or excessive stress  Neurological: no sensory or motor loss, seizures, syncope, tremors, numbness, tingling, no changes in mood, attention, or speech, no changes in orientation, memory, insight, or judgment. Hematologic: +anemia, +easy bruising or bleeding  Endocrine: +thyroid problems, no heat or cold intolerance, excessive sweating, polyuria, polydipsia, no diabetes. Objective:       /60   Pulse 94   Temp 97.4 °F (36.3 °C) (Oral)   Resp 18   SpO2 91%     No intake/output data recorded. No intake/output data recorded. Physical Exam:  General: well developed, well nourished, ill-appearing, thin, NAD, resting comfortably  HEENT: PERRLA, no abnormalities noted, sclera clear, EOMs intact  Neck: supple, no JVD, trachea midline  Heart: S1S2 with RRR without obvious murmurs, rubs or gallops  Lungs: clear but overall diminished, few expiratory wheezes, normal effort on O2 at rest, some mild dyspnea with prolonged conversation  Abd: soft, nontender, nondistended, +bowel sounds  Ext: warm, no edema, calves supple/nontender, pulses 2+ bilaterally  Skin: warm and dry, scattered bruises, skin is thin and fragile, noted crusted and healing shingles to L flank  Psychiatric: appropriate mood and affect, cooperative  Neurologic: A&O X 3, moves all 4 equally, CNs intact      ECG: SR with I/L ST depression on arrival, resolved on latest EKG    ECHO: ordered and pending    CXR: Impression:  1.  Emphysema with grossly stable posttreatment changes in the left lower lobe. No evidence of pneumonia. CTA Chest:  1. No evidence for pulmonary embolism, or acute thoracic process otherwise. Only advanced emphysematous changes are seen of the lungs. 2. Multiple suspicious findings in relation to the patient's known neoplasm. There are increasing soft tissue components associated with the left perihilar   lesion and there is an enlarging right hilar lymph node although this is not   clearly enlarged at this time. Disease progression cannot be excluded given   these findings. The patient should be immediately referred back to her   oncologist or instructed to immediately follow up with her oncologist to assess   these potentially worrisome changes.         Data Review:      Recent Results (from the past 24 hour(s))   COVID-19, Rapid    Collection Time: 12/09/22 12:31 PM    Specimen: Nasopharyngeal   Result Value Ref Range    Source Nasopharyngeal      SARS-CoV-2, Rapid Not detected NOTD     Troponin T    Collection Time: 12/09/22 12:31 PM   Result Value Ref Range    Troponin T 28.9 (H) 0 - 14 ng/L   CBC with Auto Differential    Collection Time: 12/09/22 12:33 PM   Result Value Ref Range    WBC 6.5 4.3 - 11.1 K/uL    RBC 2.76 (L) 4.05 - 5.20 M/uL    Hemoglobin 8.8 (L) 11.7 - 15.4 g/dL    Hematocrit 27.0 (L) 35.8 - 46.3 %    MCV 97.8 82.0 - 102.0 FL    MCH 31.9 26.1 - 32.9 PG    MCHC 32.6 31.4 - 35.0 g/dL    RDW 17.6 (H) 11.9 - 14.6 %    Platelets 579 555 - 101 K/uL    MPV 9.2 (L) 9.4 - 12.3 FL    nRBC 0.00 0.0 - 0.2 K/uL    Differential Type AUTOMATED      Seg Neutrophils 84 (H) 43 - 78 %    Lymphocytes 5 (L) 13 - 44 %    Monocytes 10 4.0 - 12.0 %    Eosinophils % 1 0.5 - 7.8 %    Basophils 1 0.0 - 2.0 %    Immature Granulocytes 1 0.0 - 5.0 %    Segs Absolute 5.5 1.7 - 8.2 K/UL    Absolute Lymph # 0.3 (L) 0.5 - 4.6 K/UL    Absolute Mono # 0.6 0.1 - 1.3 K/UL    Absolute Eos # 0.1 0.0 - 0.8 K/UL    Basophils Absolute 0.0 0.0 - 0.2 K/UL    Absolute Immature Granulocyte 0.0 0.0 - 0.5 K/UL   CMP    Collection Time: 12/09/22 12:33 PM   Result Value Ref Range    Sodium 134 133 - 143 mmol/L    Potassium 3.3 (L) 3.5 - 5.1 mmol/L    Chloride 95 (L) 98 - 107 mmol/L    CO2 24 21 - 32 mmol/L    Anion Gap 15 (H) 2 - 11 mmol/L    Glucose 126 (H) 65 - 100 mg/dL    BUN 11 7.0 - 18.0 MG/DL    Creatinine 0.56 (L) 0.6 - 1.0 MG/DL    Est, Glom Filt Rate >60 >60 ml/min/1.73m2    Calcium 9.2 8.3 - 10.4 MG/DL    Total Bilirubin 0.4 0.2 - 1.1 MG/DL    ALT 5 (L) 13.0 - 61.0 U/L    AST 18 15 - 37 U/L    Alk Phosphatase 56 45.0 - 117.0 U/L    Total Protein 6.8 6.4 - 8.2 g/dL    Albumin 3.4 3.2 - 4.6 g/dL    Globulin 3.4 2.8 - 4.5 g/dL    Albumin/Globulin Ratio 1.0 0.4 - 1.6     Procalcitonin    Collection Time: 12/09/22 12:33 PM   Result Value Ref Range    Procalcitonin 0.15 0.00 - 0.49 ng/mL   Culture, Blood 2    Collection Time: 12/09/22 12:33 PM    Specimen: Blood   Result Value Ref Range    Special Requests PORT      Culture PENDING    Rapid influenza A/B antigens    Collection Time: 12/09/22 12:33 PM    Specimen: Nasal Washing   Result Value Ref Range    Influenza A Ag Negative NEG      Influenza B Ag Negative NEG      Source Nasopharyngeal     Lactic Acid    Collection Time: 12/09/22 12:33 PM   Result Value Ref Range    Lactic Acid 1.70 0.4 - 2.0 mmol/L   Troponin T    Collection Time: 12/09/22  2:25 PM   Result Value Ref Range    Troponin T 30.1 (H) 0 - 14 ng/L   APTT    Collection Time: 12/09/22  2:26 PM   Result Value Ref Range    PTT 31.2 24.5 - 34.2 SEC   Lactic Acid    Collection Time: 12/09/22  2:26 PM   Result Value Ref Range    Lactic Acid 1.30 0.4 - 2.0 mmol/L   EKG 12 lead    Collection Time: 12/09/22  6:50 PM   Result Value Ref Range    Ventricular Rate 72 BPM    Atrial Rate 72 BPM    P-R Interval 164 ms    QRS Duration 82 ms    Q-T Interval 404 ms    QTc Calculation (Bazett) 442 ms    P Axis 73 degrees    R Axis 67 degrees    T Axis 105 degrees    Diagnosis Normal sinus rhythm    Troponin    Collection Time: 12/09/22  8:05 PM   Result Value Ref Range    Troponin, High Sensitivity 113.1 (HH) 0 - 14 pg/mL   Hemoglobin and Hematocrit    Collection Time: 12/09/22  8:05 PM   Result Value Ref Range    Hemoglobin 8.8 (L) 11.7 - 15.4 g/dL    Hematocrit 27.4 (L) 35.8 - 46.3 %         Assessment/Plan:   Principal Problem:    Chest pain -- Pt with similar yet different CP than with prior CV events, will plan to trend troponins and cont IV Heparin for now, check ECHO in AM, cont ASA and Plavix, multiple reasons for CP presently    Active Problems:    Dyspnea -- progressive, CT showing progressive disease ? ?  -- will ask Oncology to evaluate in AM, d/w Pt, cont home pulm meds and O2      Shingles -- cont acyclovir, these are very painful per Pt      Non-small cell lung cancer -- Oncology eval in AM      Hyperthyroidism -- cont home meds      Essential hypertension, benign -- cont home meds, monitor BP and adjust as indicated      Iron deficiency anemia due to chronic blood loss -- follow serial H/H      Chronic anxiety -- cont home meds      Coronary artery disease involving native coronary artery of native heart without angina pectoris -- cont ASA, Plavix, BB and statin      Chronic respiratory failure with hypoxia -- cont home O2 and pulm meds, CT showing potentially progressive CA -- Oncology to see      COPD, very severe -- cont O2 and home pulm meds      DNR (do not resuscitate) -- Pt wishes to be a FULL CODE this admit      Hyperlipidemia -- cont statin      Moderate to severe mitral regurgitation -- update ECHO      Depression -- cont home meds        YISEL EnglishC  12/9/2022  9:04 PM

## 2022-12-10 NOTE — CONSULTS
History and Physical Initial Visit NOTE           12/10/2022    Carl yHde                        Date of Admission:  12/9/2022    The patient's chart is reviewed and the patient is discussed with the staff. Subjective:   Patient is a 67 y.o.  female seen and evaluated at the request of Dr. Eliezer Virgen. Patient has a past medical history of CAD with 15 stents per , hypertension, hyperlipidemia, non-small cell lung cancer stage III, severe COPD on home O2 2 L around-the-clock, anxiety, depression and hypothyroidism who presented to the emergency department with chest pain and shortness of breath. She has been having worsening dyspnea over the past month, increased her home O2 from 2 to 3 L due to sats in the 80s. She took nitroglycerin several times at home with relief of the chest pain. In the emergency department, she was noted to have an elevated troponin and BNP. She was admitted to the cardiology service and heparin bolus and drip was started. Once transferred to [facility], her dyspnea worsened along with wheezing. She was given an albuterol treatment with minimal improvement though she had significant tachycardia in the 140s. [facility] was consulted emergently for further management of her severe dyspnea. Per the nurse, she was tripoding and severely dyspneic this evening. She has a history of very severe COPD with centrilobular emphysema, on nocturnal O2. She had a navigation bronchoscopy with attempts at biopsy of a JHONY nodule 2/11/20 which was difficult to visualize with radial probe and ultimately was non diagnostic. PET scan showed this area to have an SUV of 2.1. She was to have a bronchoscopy attempt at [facility] in July but repeat imaging performed here showed improvement in this area so the bronchoscopy has been cancelled.  Was referred to [facility] for eval for lung transplant ultimately felt not to be a lung transplant candidate. Was then seen by Dr Kari Castillo with [address] and also felt not to be a transplant candidate. June 2022 she had a CT scan with increase LLL mass and L hilar lymph node. She had bronch with EBUS and nic showing adenocarcinoma in 11R. Stage III. She was seen by heme/onc. She has completed chemo/radiation and states that she tolerated this well. Pt sees us in the office for very severe COPD with chronic hypoxic respiratory failure. Deemed not a transplant candidate by [address] and [address] Calzada. Dx with stage III lung cancer in 2022. Completed chemo and radiation, on immunotherapy now. Attempting to get back into pulm rehab. Takes Symbicort, spiriva, daliresp, prn albuterol at home and wears 2-3 L O2. Pt seen overnight by Dr. Alicia Jauregui for increasing dyspnea and hypoxia. Pt now stable on 3L NC. Pt sitting up in bed talking on phone to family. Pt states she still feels \"out of it\", but her breathing is much better from last night. Pt states she is supposed to have cardiac rehab in a few weeks, but doesn't think she'll be well enough to attend. Pt states she wishes we could \"wave a magic wand and have her back walking around on 2 liters\".     Review of Systems: Comprehensive ROS negative except in HPI    Current Outpatient Medications   Medication Instructions    acetaminophen (TYLENOL) 325 MG tablet Oral, EVERY 4 HOURS PRN    acyclovir (ZOVIRAX) 800 mg, Oral, 2 TIMES DAILY    albuterol (PROVENTIL) (2.5 MG/3ML) 0.083% nebulizer solution TAKE 3 ML BY NEBULIZATION EVERY 6 HOURS AS NEEDED FOR SHORTNESS OF BREATH    aspirin 81 mg, Oral, DAILY    atorvastatin (LIPITOR) 80 mg, Oral, Nightly, TAKE 1 TABLET BY MOUTH DAILY    azithromycin (ZITHROMAX) 500 MG tablet Oral    budesonide-formoterol (SYMBICORT) 160-4.5 MCG/ACT AERO 2 puffs, Inhalation, 2 TIMES DAILY    cetirizine (ZYRTEC) 10 mg, Oral, DAILY PRN    clopidogrel (PLAVIX) 75 mg, Oral, DAILY    escitalopram (LEXAPRO) 10 mg, Oral, DAILY    ezetimibe (ZETIA) 10 mg, Oral, Nightly gabapentin (NEURONTIN) 300 mg, Oral, 3 TIMES DAILY    HYDROcodone-acetaminophen (NORCO) 5-325 MG per tablet 2 tablets, Oral, EVERY 6 HOURS PRN, Intended supply: 3 days. Take lowest dose possible to manage pain    ketorolac (TORADOL) 10 mg, Oral, EVERY 6 HOURS PRN    lidocaine-prilocaine (EMLA) 2.5-2.5 % cream Apply topically as needed.     Magic Mouthwash (MIRACLE MOUTHWASH) 5 mLs, Swish & Swallow, 4 TIMES DAILY PRN, Equal parts viscous lidocaine, maalox, nystatin, benadryl    methIMAzole (TAPAZOLE) 5 mg, Oral, DAILY    metoprolol succinate (TOPROL XL) 100 mg, Oral, DAILY    nitroGLYCERIN (NITROSTAT) 0.4 mg, SubLINGual, 3 TIMES DAILY PRN    ondansetron (ZOFRAN ODT) 4 mg, Oral, EVERY 8 HOURS PRN    ondansetron (ZOFRAN) 8 mg, Oral, EVERY 8 HOURS PRN    OXYGEN Inhalation, 2 LPM    pantoprazole (PROTONIX) 40 mg, Oral, 2 TIMES DAILY    predniSONE (DELTASONE) 5 MG tablet     predniSONE (DELTASONE) 10 mg, Oral, DAILY WITH BREAKFAST    prochlorperazine (COMPAZINE) 10 mg, Oral, EVERY 6 HOURS PRN    Pseudoephedrine-guaiFENesin (MUCINEX D PO) Oral, 2 times daily    Roflumilast (DALIRESP) 500 mcg, Oral, NIGHTLY    tiotropium (SPIRIVA RESPIMAT) 2.5 MCG/ACT AERS inhaler 2 puffs, Inhalation, DAILY    torsemide (DEMADEX) 10 mg, Oral, DAILY    traMADol (ULTRAM) 100 mg, Oral, 4 TIMES DAILY, Z51.5 Palliative Care      Past Medical History:   Diagnosis Date    Asthma     Closed right hip fracture ([facility] [address] 02/2022    Coronary artery disease     Former cigarette smoker     Fracture of right femur ([facility] 02/2022    Hyperthyroidism     Lung mass     Multiple closed anterior[facility]posterior [facility] 02/2022    Non-small cell lung cancer (HCC)     Severe chronic obstructive pulmonary disease (HCC)     Subdural hematoma 03/2022    Thyroid nodule      Past Surgical History:   Procedure Laterality Date    BRONCHOSCOPY N/A 07/20/2022    BRONCHOSCOPY ENDOBRONCHIAL ULTRASOUND performed by Robbie Salinas MD at [facility] ENDOSCOPY CAROTID ENDARTERECTOMY Right     CATARACT REMOVAL Bilateral     CORONARY STENT PLACEMENT  2022    HIP FRACTURE SURGERY Right 2022    IR PORT PLACEMENT EQUAL OR GREATER THAN 5 YEARS  2022    IR PORT PLACEMENT EQUAL OR GREATER THAN 5 YEARS 2022 [facility]    WISDOM TOOTH EXTRACTION      as a teenager     Social History     Socioeconomic History    Marital status:      Spouse name: Not on file    Number of children: Not on file    Years of education: Not on file    Highest education level: Not on file   Occupational History    Not on file   Tobacco Use    Smoking status: Former     Packs/day: 0.50     Years: 50.00     Pack years: 25.00     Types: Cigarettes     Quit date:      Years since quittin.9    Smokeless tobacco: Never   Substance and Sexual Activity    Alcohol use: Not Currently    Drug use: Not on file    Sexual activity: Not on file   Other Topics Concern    Not on file   Social History Narrative    Not on file     Social Determinants of Health     Financial Resource Strain: Not on file   Food Insecurity: Not on file   Transportation Needs: Not on file   Physical Activity: Not on file   Stress: Not on file   Social Connections: Not on file   Intimate Partner Violence: Not on file   Housing Stability: Not on file     Family History   Adopted: Yes   Problem Relation Age of Onset    No Known Problems Mother     No Known Problems Father     Thyroid Disease Daughter      Allergies   Allergen Reactions    Promethazine Nausea And Vomiting and Other (See Comments)     Severe vomiting      Objective:   Blood pressure 103/62, pulse (!) 105, temperature 98.5 °F (36.9 °C), temperature source Oral, resp. rate 22, weight 130 lb 8 oz (59.2 kg), SpO2 97 %.    Intake/Output Summary (Last 24 hours) at 12/10/2022 1021  Last data filed at 12/10/2022 0524  Gross per 24 hour   Intake --   Output 500 ml   Net -500 ml     PHYSICAL EXAM   Constitutional:  the patient is well developed and in no acute distress  EENMT:  Sclera clear, pupils equal, oral mucosa moist  Respiratory: symmetric chest rise. Diminished bilaterally with no wheezes noted. Cardiovascular:  RRR without M,G,R. There is 1+ lower extremity edema. Gastrointestinal: soft and non-tender; with positive bowel sounds. Musculoskeletal: warm without cyanosis. Normal muscle tone. Skin:  no jaundice or rashes, no wounds   Neurologic: symmetric strength, fluent speech  Psychiatric:  calm, appropriate, oriented x 4    Imaging: I performed an independent interpretation of the patient's images. CXR: 12/10                                                                     12/9             CT chest:     Lab:    Recent Labs     12/09/22  1233 12/09/22  2005 12/10/22  0100 12/10/22  0513   WBC 6.5  --   --  7.8   HGB 8.8* 8.8*  --  8.6*   HCT 27.0* 27.4*  --  27.8*     --   --  196     --   --  134   K 3.3*  --   --  3.6   CL 95*  --   --  101   CO2 24  --   --  24   BUN 11  --   --  11   CREATININE 0.56*  --   --  0.70   MG  --   --   --  1.8   BILITOT 0.4  --   --   --    AST 18  --   --   --    ALT 5*  --   --   --    ALKPHOS 56  --   --   --    TROPHS  --  113.1* 123.6*  --    NTPROBNP  --   --  3,073*  --        Lab Results   Component Value Date/Time     12/10/2022 05:13 AM    K 3.6 12/10/2022 05:13 AM     12/10/2022 05:13 AM    CO2 24 12/10/2022 05:13 AM    BUN 11 12/10/2022 05:13 AM    CREATININE 0.70 12/10/2022 05:13 AM    GLUCOSE 184 12/10/2022 05:13 AM    CALCIUM 8.8 12/10/2022 05:13 AM      Lab Results   Component Value Date    BNP 2,917 (H) 03/25/2022       ECHO:    Left Ventricle: Left ventricle size is normal. Normal wall thickness. Normal wall motion. Low normal left ventricular systolic function. EF by 2D Simpsons Biplane is 52%. Normal diastolic function. Aortic Valve: Mildly thickened cusps. Mitral Valve: Mildly thickened leaflets. Mildly calcified anterior leaflet.  Moderate to severe transvalvular regurgitation with an eccentrically directed jet and may underestimate severity. Transesophageal echo  recommended for further evaluation of potentially severe mitral regurgitation. Aorta: Dilated annulus. MICRO:   Recent Labs     12/09/22  1233   CULTURE PENDING     Assessment and Plan:  (Medical Decision Making)   Principal Problem:    Chest pain  Plan: Repeat EKG and troponin along with BNP. Initial troponin elevated at 113, heparin drip is going.  mg and Nitropaste. Per cards, elevated troponin not consistent with acute coronary syndrome likely related to demand ischemia no invasive work-up is planned. Active Problems:    Dyspnea  Plan: No improvement after approximately 1 hour of Xopenex and Atrovent. Solu-Medrol 125 mg IV and terbutaline 0.25 mg subcu given. Patient placed on BiPAP with significant improvement in her mental status. She is now more alert. We will continue on BiPAP and reevaluate in 2 hours. Revaluate pt at 0430 per RN and repeat ABG much better and Bipap removed. Pt on 3L NC. Pt much less dyspneic this morning, but still with shortness of breath on exertion or talking. Shingles  Plan: Per OP onc, prophylactic acyclovir for 3 months. Non-small cell lung cancer ([facility]  Plan: Onc consult pending; currently on durvalumab immunotherapy monthly. Lung ca seems to be progressing with increasing growth of tumors. Essential hypertension, benign  Plan: Per primary, stable       Coronary artery disease involving native coronary artery of native heart without angina pectoris  Plan: Continue to trend troponin, repeat EKG as needed. Continue heparin. Echo pending today. Chronic respiratory failure with hypoxia ([address]  Plan: On 3L NC. Immunotherapy durvalumab does pose risk of pneumonitis? Continue solumedrol 125 mg daily. COPD, very severe ([address]  Plan: Continue spiriva, symbicort, daliresp, and albuterol prn. On 3L NC.           Full Code    Thank you very much for this referral.  We appreciate the opportunity to participate in this patient's care. Will follow along with above stated plan. In this split/shared evaluation I performed performed a medically appropriate history and exam, counseled and educated the patient and/or family member, ordered medications, tests or procedures, documented information in EMR, and coordinated care. which accounted for 10 clinical time.      YISEL Pizarro - NP

## 2022-12-11 LAB
ANION GAP SERPL CALC-SCNC: 4 MMOL/L (ref 2–11)
APPEARANCE UR: CLEAR
BACTERIA SPEC CULT: NORMAL
BACTERIA SPEC CULT: NORMAL
BACTERIA URNS QL MICRO: ABNORMAL /HPF
BILIRUB UR QL: NEGATIVE
BUN SERPL-MCNC: 10 MG/DL (ref 8–23)
CALCIUM SERPL-MCNC: 8.7 MG/DL (ref 8.3–10.4)
CHLORIDE SERPL-SCNC: 102 MMOL/L (ref 101–110)
CO2 SERPL-SCNC: 30 MMOL/L (ref 21–32)
COLOR UR: ABNORMAL
CREAT SERPL-MCNC: 0.6 MG/DL (ref 0.6–1)
EPI CELLS #/AREA URNS HPF: ABNORMAL /HPF
ERYTHROCYTE [DISTWIDTH] IN BLOOD BY AUTOMATED COUNT: 17.8 % (ref 11.9–14.6)
GLUCOSE SERPL-MCNC: 116 MG/DL (ref 65–100)
GLUCOSE UR STRIP.AUTO-MCNC: NEGATIVE MG/DL
HCT VFR BLD AUTO: 24.4 % (ref 35.8–46.3)
HGB BLD-MCNC: 7.6 G/DL (ref 11.7–15.4)
HGB UR QL STRIP: ABNORMAL
KETONES UR QL STRIP.AUTO: NEGATIVE MG/DL
LEUKOCYTE ESTERASE UR QL STRIP.AUTO: ABNORMAL
MAGNESIUM SERPL-MCNC: 2 MG/DL (ref 1.8–2.4)
MCH RBC QN AUTO: 31.3 PG (ref 26.1–32.9)
MCHC RBC AUTO-ENTMCNC: 31.1 G/DL (ref 31.4–35)
MCV RBC AUTO: 100.4 FL (ref 82–102)
NITRITE UR QL STRIP.AUTO: NEGATIVE
NRBC # BLD: 0 K/UL (ref 0–0.2)
PH UR STRIP: 5.5 [PH] (ref 5–9)
PLATELET # BLD AUTO: 196 K/UL (ref 150–450)
PMV BLD AUTO: 9.5 FL (ref 9.4–12.3)
POTASSIUM SERPL-SCNC: 3.8 MMOL/L (ref 3.5–5.1)
PROT UR STRIP-MCNC: 100 MG/DL
RBC # BLD AUTO: 2.43 M/UL (ref 4.05–5.2)
RBC #/AREA URNS HPF: >100 /HPF
SERVICE CMNT-IMP: NORMAL
SERVICE CMNT-IMP: NORMAL
SODIUM SERPL-SCNC: 136 MMOL/L (ref 133–143)
SP GR UR REFRACTOMETRY: 1.01 (ref 1–1.02)
UFH PPP CHRO-ACNC: 0.35 IU/ML (ref 0.3–0.7)
UROBILINOGEN UR QL STRIP.AUTO: 0.2 EU/DL (ref 0.2–1)
WBC # BLD AUTO: 5.3 K/UL (ref 4.3–11.1)
WBC URNS QL MICRO: ABNORMAL /HPF

## 2022-12-11 PROCEDURE — 94640 AIRWAY INHALATION TREATMENT: CPT

## 2022-12-11 PROCEDURE — 6370000000 HC RX 637 (ALT 250 FOR IP): Performed by: NURSE PRACTITIONER

## 2022-12-11 PROCEDURE — 99232 SBSQ HOSP IP/OBS MODERATE 35: CPT | Performed by: INTERNAL MEDICINE

## 2022-12-11 PROCEDURE — 2580000003 HC RX 258: Performed by: INTERNAL MEDICINE

## 2022-12-11 PROCEDURE — 80048 BASIC METABOLIC PNL TOTAL CA: CPT

## 2022-12-11 PROCEDURE — 85027 COMPLETE CBC AUTOMATED: CPT

## 2022-12-11 PROCEDURE — 6360000002 HC RX W HCPCS: Performed by: INTERNAL MEDICINE

## 2022-12-11 PROCEDURE — 85520 HEPARIN ASSAY: CPT

## 2022-12-11 PROCEDURE — 6360000002 HC RX W HCPCS: Performed by: PHYSICIAN ASSISTANT

## 2022-12-11 PROCEDURE — 94761 N-INVAS EAR/PLS OXIMETRY MLT: CPT

## 2022-12-11 PROCEDURE — 6360000002 HC RX W HCPCS: Performed by: EMERGENCY MEDICINE

## 2022-12-11 PROCEDURE — 83735 ASSAY OF MAGNESIUM: CPT

## 2022-12-11 PROCEDURE — 81001 URINALYSIS AUTO W/SCOPE: CPT

## 2022-12-11 PROCEDURE — 1100000003 HC PRIVATE W/ TELEMETRY

## 2022-12-11 PROCEDURE — 6360000002 HC RX W HCPCS: Performed by: NURSE PRACTITIONER

## 2022-12-11 PROCEDURE — 6370000000 HC RX 637 (ALT 250 FOR IP): Performed by: PHYSICIAN ASSISTANT

## 2022-12-11 PROCEDURE — 2700000000 HC OXYGEN THERAPY PER DAY

## 2022-12-11 PROCEDURE — 2580000003 HC RX 258: Performed by: NURSE PRACTITIONER

## 2022-12-11 RX ORDER — ALBUTEROL SULFATE 2.5 MG/3ML
2.5 SOLUTION RESPIRATORY (INHALATION) EVERY 6 HOURS
Status: DISCONTINUED | OUTPATIENT
Start: 2022-12-11 | End: 2022-12-11

## 2022-12-11 RX ORDER — ALBUTEROL SULFATE 2.5 MG/3ML
2.5 SOLUTION RESPIRATORY (INHALATION)
Status: DISCONTINUED | OUTPATIENT
Start: 2022-12-11 | End: 2022-12-21 | Stop reason: HOSPADM

## 2022-12-11 RX ORDER — SODIUM CHLORIDE FOR INHALATION 3 %
4 VIAL, NEBULIZER (ML) INHALATION 2 TIMES DAILY
Status: DISCONTINUED | OUTPATIENT
Start: 2022-12-11 | End: 2022-12-21 | Stop reason: HOSPADM

## 2022-12-11 RX ORDER — BUDESONIDE 0.5 MG/2ML
0.5 INHALANT ORAL 2 TIMES DAILY
Status: DISCONTINUED | OUTPATIENT
Start: 2022-12-11 | End: 2022-12-21 | Stop reason: HOSPADM

## 2022-12-11 RX ORDER — METHYLPREDNISOLONE SODIUM SUCCINATE 125 MG/2ML
60 INJECTION, POWDER, LYOPHILIZED, FOR SOLUTION INTRAMUSCULAR; INTRAVENOUS EVERY 12 HOURS
Status: DISCONTINUED | OUTPATIENT
Start: 2022-12-11 | End: 2022-12-15

## 2022-12-11 RX ORDER — HYDROCODONE BITARTRATE AND HOMATROPINE METHYLBROMIDE ORAL SOLUTION 5; 1.5 MG/5ML; MG/5ML
5 LIQUID ORAL EVERY 4 HOURS PRN
Status: DISCONTINUED | OUTPATIENT
Start: 2022-12-11 | End: 2022-12-16 | Stop reason: SDUPTHER

## 2022-12-11 RX ORDER — GUAIFENESIN/DEXTROMETHORPHAN 100-10MG/5
5 SYRUP ORAL EVERY 4 HOURS PRN
Status: DISCONTINUED | OUTPATIENT
Start: 2022-12-11 | End: 2022-12-21 | Stop reason: HOSPADM

## 2022-12-11 RX ADMIN — METHYLPREDNISOLONE SODIUM SUCCINATE 60 MG: 125 INJECTION, POWDER, FOR SOLUTION INTRAMUSCULAR; INTRAVENOUS at 20:55

## 2022-12-11 RX ADMIN — TRAMADOL HYDROCHLORIDE 100 MG: 50 TABLET ORAL at 18:15

## 2022-12-11 RX ADMIN — TRAMADOL HYDROCHLORIDE 100 MG: 50 TABLET ORAL at 13:42

## 2022-12-11 RX ADMIN — METHIMAZOLE 5 MG: 5 TABLET ORAL at 08:27

## 2022-12-11 RX ADMIN — EZETIMIBE 10 MG: 10 TABLET ORAL at 20:56

## 2022-12-11 RX ADMIN — GABAPENTIN 300 MG: 300 CAPSULE ORAL at 08:27

## 2022-12-11 RX ADMIN — GABAPENTIN 300 MG: 300 CAPSULE ORAL at 13:42

## 2022-12-11 RX ADMIN — ALBUTEROL SULFATE 2.5 MG: 2.5 SOLUTION RESPIRATORY (INHALATION) at 21:37

## 2022-12-11 RX ADMIN — Medication 4 ML: at 13:03

## 2022-12-11 RX ADMIN — IPRATROPIUM BROMIDE 0.5 MG: 0.5 SOLUTION RESPIRATORY (INHALATION) at 21:36

## 2022-12-11 RX ADMIN — TRAMADOL HYDROCHLORIDE 100 MG: 50 TABLET ORAL at 08:27

## 2022-12-11 RX ADMIN — PANTOPRAZOLE SODIUM 40 MG: 40 TABLET, DELAYED RELEASE ORAL at 20:56

## 2022-12-11 RX ADMIN — ALBUTEROL SULFATE 2.5 MG: 2.5 SOLUTION RESPIRATORY (INHALATION) at 13:03

## 2022-12-11 RX ADMIN — ESCITALOPRAM OXALATE 10 MG: 10 TABLET ORAL at 08:28

## 2022-12-11 RX ADMIN — SODIUM CHLORIDE, PRESERVATIVE FREE 10 ML: 5 INJECTION INTRAVENOUS at 08:34

## 2022-12-11 RX ADMIN — BUDESONIDE 500 MCG: 0.5 INHALANT RESPIRATORY (INHALATION) at 21:35

## 2022-12-11 RX ADMIN — MOMETASONE FUROATE AND FORMOTEROL FUMARATE DIHYDRATE 2 PUFF: 200; 5 AEROSOL RESPIRATORY (INHALATION) at 07:27

## 2022-12-11 RX ADMIN — ACYCLOVIR 800 MG: 800 TABLET ORAL at 08:28

## 2022-12-11 RX ADMIN — ACYCLOVIR 800 MG: 800 TABLET ORAL at 20:55

## 2022-12-11 RX ADMIN — HEPARIN SODIUM 12 UNITS/KG/HR: 10000 INJECTION, SOLUTION INTRAVENOUS at 03:51

## 2022-12-11 RX ADMIN — TIOTROPIUM BROMIDE INHALATION SPRAY 2 PUFF: 3.12 SPRAY, METERED RESPIRATORY (INHALATION) at 07:27

## 2022-12-11 RX ADMIN — TRAMADOL HYDROCHLORIDE 100 MG: 50 TABLET ORAL at 20:56

## 2022-12-11 RX ADMIN — PANTOPRAZOLE SODIUM 40 MG: 40 TABLET, DELAYED RELEASE ORAL at 08:27

## 2022-12-11 RX ADMIN — GABAPENTIN 300 MG: 300 CAPSULE ORAL at 20:56

## 2022-12-11 RX ADMIN — IPRATROPIUM BROMIDE 0.5 MG: 0.5 SOLUTION RESPIRATORY (INHALATION) at 13:02

## 2022-12-11 RX ADMIN — SODIUM CHLORIDE, PRESERVATIVE FREE 10 ML: 5 INJECTION INTRAVENOUS at 21:01

## 2022-12-11 RX ADMIN — LORAZEPAM 1 MG: 2 INJECTION INTRAMUSCULAR; INTRAVENOUS at 23:22

## 2022-12-11 RX ADMIN — ATORVASTATIN CALCIUM 80 MG: 80 TABLET, FILM COATED ORAL at 20:56

## 2022-12-11 RX ADMIN — GUAIFENESIN 600 MG: 600 TABLET ORAL at 08:28

## 2022-12-11 RX ADMIN — ASPIRIN 81 MG: 81 TABLET, CHEWABLE ORAL at 08:27

## 2022-12-11 RX ADMIN — BUDESONIDE 500 MCG: 0.5 INHALANT RESPIRATORY (INHALATION) at 13:03

## 2022-12-11 RX ADMIN — IPRATROPIUM BROMIDE 0.5 MG: 0.5 SOLUTION RESPIRATORY (INHALATION) at 07:27

## 2022-12-11 RX ADMIN — CLOPIDOGREL BISULFATE 75 MG: 75 TABLET ORAL at 08:27

## 2022-12-11 RX ADMIN — GUAIFENESIN 600 MG: 600 TABLET ORAL at 20:56

## 2022-12-11 RX ADMIN — METHYLPREDNISOLONE SODIUM SUCCINATE 125 MG: 125 INJECTION, POWDER, FOR SOLUTION INTRAMUSCULAR; INTRAVENOUS at 08:27

## 2022-12-11 RX ADMIN — HYDROCODONE BITARTRATE AND HOMATROPINE METHYLBROMIDE 5 ML: 5; 1.5 SOLUTION ORAL at 23:22

## 2022-12-11 RX ADMIN — Medication 4 ML: at 21:39

## 2022-12-11 ASSESSMENT — PAIN SCALES - GENERAL
PAINLEVEL_OUTOF10: 0

## 2022-12-11 NOTE — PROGRESS NOTES
RRT Clinical Rounding Nurse Update    Vitals:    12/10/22 1803 12/10/22 1925 12/11/22 0013 12/11/22 0441   BP:  124/65 112/78 115/64   Pulse: 100 (!) 101 99 92   Resp: 19 22 20 16   Temp:  98.3 °F (36.8 °C) 98.1 °F (36.7 °C) 97.7 °F (36.5 °C)   TempSrc:  Oral     SpO2: 96% 95% 95% 98%   Weight:            DETERIORATION INDEX SCORE: 28    ASSESSMENT:  Previous outreach assessment was reviewed. There have been no significant changes since previous assessment. PLAN:  Will discharge from RRT Clinical Rounding Program per protocol. Please call if needed.     Yisel Chapa RN  [address]: [phone]

## 2022-12-11 NOTE — CONSULTS
Inpatient Hematology / Oncology Consult Note    Reason for Consult:  Chest pain [R07.9]  Referring Physician:  Kimani Zaragoza MD    History of Present Illness:  Ms. Teddie Skiff is a 67 y.o. female admitted on 12/9/2022 with a primary diagnosis of The primary encounter diagnosis was Shortness of breath. A diagnosis of Ischemic heart disease was also pertinent to this visit. [facility] 71-year-old white female history of very severe COPD on supplemental O2 (not felt to be a candidate for lung transplant in past), CAD, MI, right carotid enterectomy, under care of oncology Dr. Omer Acuna for left-sided lung adenocarcinoma, stage III s/p definitive concurrent chemo XRT and now on durvalumab therapy. Patient admitted through ER with chest discomfort and worsening shortness of breath. CT chest without evidence of PE though concern for disease progression, oncology consulted. Review of Systems:  As mentioned above. All other systems reviewed in full and are unremarkable.        Allergies   Allergen Reactions    Promethazine Nausea And Vomiting and Other (See Comments)     Severe vomiting      Past Medical History:   Diagnosis Date    Asthma     Closed right hip fracture ([address] 02/2022    Coronary artery disease     Former cigarette smoker     Fracture of right femur ([address] 02/2022    Hyperthyroidism     Lung mass     Multiple closed anterior-posterior compression fractures of pelvis ([address] 02/2022    Non-small cell lung cancer ([address]     Severe chronic obstructive pulmonary disease ([address]     Subdural hematoma 03/2022    Thyroid nodule      Past Surgical History:   Procedure Laterality Date    BRONCHOSCOPY N/A 07/20/2022    BRONCHOSCOPY ENDOBRONCHIAL ULTRASOUND performed by Kelly Sorto MD at [facility] ENDOSCOPY    CAROTID ENDARTERECTOMY Right     CATARACT REMOVAL Bilateral     CORONARY STENT PLACEMENT  03/2022    HIP FRACTURE SURGERY Right 02/2022    IR PORT PLACEMENT EQUAL OR GREATER THAN 5 YEARS  08/05/2022    IR PORT PLACEMENT EQUAL OR GREATER THAN 5 YEARS 2022 [facility] SPECIALS    WISDOM TOOTH EXTRACTION      as a teenager     Family History   Adopted: Yes   Problem Relation Age of Onset    No Known Problems Mother     No Known Problems Father     Thyroid Disease Daughter      Social History     Socioeconomic History    Marital status:      Spouse name: Not on file    Number of children: Not on file    Years of education: Not on file    Highest education level: Not on file   Occupational History    Not on file   Tobacco Use    Smoking status: Former     Packs/day: 0.50     Years: 50.00     Pack years: 25.00     Types: Cigarettes     Quit date:      Years since quittin.9    Smokeless tobacco: Never   Substance and Sexual Activity    Alcohol use: Not Currently    Drug use: Not on file    Sexual activity: Not on file   Other Topics Concern    Not on file   Social History Narrative    Not on file     Social Determinants of Health     Financial Resource Strain: Not on file   Food Insecurity: Not on file   Transportation Needs: Not on file   Physical Activity: Not on file   Stress: Not on file   Social Connections: Not on file   Intimate Partner Violence: Not on file   Housing Stability: Not on file     Current Facility-Administered Medications   Medication Dose Route Frequency Provider Last Rate Last Admin    levalbuterol (XOPENEX) nebulizer solution 1.25 mg  1.25 mg Nebulization Q4H PRN David Rangel MD   1.25 mg at 12/10/22 0117    ipratropium (ATROVENT) 0.02 % nebulizer solution 0.5 mg  0.5 mg Nebulization TID David Rangel MD   0.5 mg at 12/10/22 1801    methylPREDNISolone sodium (SOLU-MEDROL) injection 125 mg  125 mg IntraVENous Daily David Rangel MD   125 mg at 12/10/22 0158    acetaminophen (TYLENOL) tablet 650 mg  650 mg Oral Q4H PRN YISEL Ibrahim CNP        acyclovir (ZOVIRAX) tablet 800 mg  800 mg Oral BID YISEL Ibrahim CNP   800 mg at 12/10/22 0920    albuterol (PROVENTIL) nebulizer solution 2.5 mg  2.5 mg Nebulization Q4H PRN Will YISEL Portillo CNP   2.5 mg at 12/09/22 2330    aspirin chewable tablet 81 mg  81 mg Oral Daily Will YISEL Portillo CNP   81 mg at 12/10/22 0920    atorvastatin (LIPITOR) tablet 80 mg  80 mg Oral Nightly Will YISEL Portillo CNP        mometasone-formoterol (DULERA) 200-5 MCG/ACT inhaler 2 puff  2 puff Inhalation BID Will YISEL Portillo CNP   2 puff at 12/10/22 1801    cetirizine (ZYRTEC) tablet 10 mg  10 mg Oral Daily PRN Will YISEL Portillo CNP        clopidogrel (PLAVIX) tablet 75 mg  75 mg Oral Daily Will YISEL Portillo CNP   75 mg at 12/10/22 0920    escitalopram (LEXAPRO) tablet 10 mg  10 mg Oral Daily Will YISEL Portillo CNP   10 mg at 12/10/22 0920    ezetimibe (ZETIA) tablet 10 mg  10 mg Oral Nightly Will YISEL Portillo CNP        gabapentin (NEURONTIN) capsule 300 mg  300 mg Oral TID Will YISEL Portillo CNP   300 mg at 12/10/22 1447    HYDROcodone-acetaminophen (NORCO) 5-325 MG per tablet 2 tablet  2 tablet Oral Q6H PRN Will YISEL Portillo CNP        magic (miracle) mouthwash with nystatin  5 mL Swish & Swallow 4x Daily PRN Will YISEL Portillo CNP        methIMAzole (TAPAZOLE) tablet 5 mg  5 mg Oral Daily Will YISEL Portillo CNP   5 mg at 12/10/22 0920    metoprolol succinate (TOPROL XL) extended release tablet 100 mg  100 mg Oral Daily Will YISEL Portillo CNP        ondansetron (ZOFRAN-ODT) disintegrating tablet 4 mg  4 mg Oral Q8H PRN Will YISEL Portillo CNP        pantoprazole (PROTONIX) tablet 40 mg  40 mg Oral BID Will YISEL Portillo CNP   40 mg at 12/10/22 0920    Roflumilast (DALIRESP) tablet 500 mcg (Patient Supplied)  500 mcg Oral Nightly Will YISEL Portillo CNP        tiotropium (SPIRIVA RESPIMAT) 2.5 MCG/ACT inhaler 2 puff  2 puff Inhalation Daily Will YISEL Portillo CNP   2 puff at 12/10/22 0701    [Held by provider] torsemide (DEMADEX) tablet 10 mg  10 mg Oral Daily Rcik Sweta, APRN - CNP        traMADol Agustina Horde) tablet 100 mg  100 mg Oral 4x daily Rick Sweta, APRN - CNP   100 mg at 12/10/22 1704    sodium chloride flush 0.9 % injection 5-40 mL  5-40 mL IntraVENous 2 times per day Rick Sweta, APRN - CNP   5 mL at 12/10/22 1312    sodium chloride flush 0.9 % injection 5-40 mL  5-40 mL IntraVENous PRN Rick Sweta, APRN - CNP        0.9 % sodium chloride infusion   IntraVENous PRN Rick Sweta, APRN - CNP        polyethylene glycol (GLYCOLAX) packet 17 g  17 g Oral Daily PRN Rick Sweta, APRN - CNP        potassium chloride (KLOR-CON M) extended release tablet 40 mEq  40 mEq Oral PRN Rick Sweta, APRN - CNP        Or    potassium bicarb-citric acid (EFFER-K) effervescent tablet 40 mEq  40 mEq Oral PRN Rick Sweta, APRN - CNP        Or    potassium chloride 10 mEq/100 mL IVPB (Peripheral Line)  10 mEq IntraVENous PRN Rick Sweta, APRN -  mL/hr at 12/10/22 0117 10 mEq at 12/10/22 0117    magnesium sulfate 2000 mg in 50 mL IVPB premix  2,000 mg IntraVENous PRN Rick Sweta, APRN - CNP        ondansetron [facility]) injection 4 mg  4 mg IntraVENous Q6H PRN Rick Sweta, APRN - CNP        aluminum & magnesium hydroxide-simethicone (MAALOX) 200-200-20 MG/5ML suspension 30 mL  30 mL Oral Q6H PRN Rick Sweta, APRN - CNP        nitroglycerin (NITRO-BID) 2 % ointment 1 inch  1 inch Topical 4 times per day Rick Sweta, APRN - CNP   1 inch at 12/09/22 2317    nitroGLYCERIN (NITROSTAT) SL tablet 0.4 mg  0.4 mg SubLINGual Q5 Min PRN Rick Sweta, APRN - CNP        0.9 % sodium chloride infusion   IntraVENous Continuous Rick Sweta, APRN - CNP   Stopped at 12/10/22 0130    [facility]) tablet 500 mg  500 mg Oral Once per day on Mon Wed Fri JAMES Ritchie [facility]) extended release tablet 600 mg  600 mg Oral BID JAMES Ritchie   600 mg at 12/10/22 0920    heparin (porcine) injection 3,540 Units  60 Units/kg IntraVENous PRN Joyce , APRN - CNP        heparin (porcine) injection 1,770 Units  30 Units/kg IntraVENous PRN Joyce , APRN - CNP        heparin 25,000 units in dextrose 5% 250 mL (premix) infusion  5-30 Units/kg/hr IntraVENous Continuous Joyce , APRN - CNP 7.1 mL/hr at 12/10/22 0735 12 Units/kg/hr at 12/10/22 0735    LORazepam (ATIVAN) injection 1 mg  1 mg IntraVENous Q6H PRN JAMES Garcia   1 mg at 22 2350       OBJECTIVE:  Patient Vitals for the past 8 hrs:   BP Temp Temp src Pulse Resp SpO2   12/10/22 1803 -- -- -- 100 19 96 %   12/10/22 1252 -- -- -- 96 19 97 %   12/10/22 1114 123/71 97.8 °F (36.6 °C) Oral 98 -- 98 %     Temp (24hrs), Av °F (36.7 °C), Min:97.4 °F (36.3 °C), Max:98.5 °F (36.9 °C)    No intake/output data recorded. Physical Exam:  Constitutional: Well developed, well nourished female in no acute distress, sitting comfortably in the hospital bed. HEENT: Normocephalic and atraumatic. Oropharynx is clear, mucous membranes are moist.  Pupils are equal, round, and reactive to light. Extraocular muscles are intact. Sclerae anicteric. Neck supple without JVD. No thyromegaly present. Lymph node   No palpable submandibular, cervical, supraclavicular, axillary or inguinal lymph nodes. Skin Warm and dry. No bruising and no rash noted. No erythema. No pallor. Respiratory Lungs are clear to auscultation bilaterally without wheezes, rales or rhonchi, normal air exchange without accessory muscle use. CVS Normal rate, regular rhythm and normal S1 and S2. No murmurs, gallops, or rubs. Abdomen Soft, nontender and nondistended, normoactive bowel sounds. No palpable mass. No hepatosplenomegaly. Neuro Grossly nonfocal with no obvious sensory or motor deficits. MSK Normal range of motion in general.  No edema and no tenderness. Psych Appropriate mood and affect.         Labs:    Recent Results (from the past 24 hour(s)) Troponin    Collection Time: 12/09/22  8:05 PM   Result Value Ref Range    Troponin, High Sensitivity 113.1 (HH) 0 - 14 pg/mL   Hemoglobin and Hematocrit    Collection Time: 12/09/22  8:05 PM   Result Value Ref Range    Hemoglobin 8.8 (L) 11.7 - 15.4 g/dL    Hematocrit 27.4 (L) 35.8 - 46.3 %   Troponin    Collection Time: 12/10/22  1:00 AM   Result Value Ref Range    Troponin, High Sensitivity 123.6 (HH) 0 - 14 pg/mL   Brain Natriuretic Peptide    Collection Time: 12/10/22  1:00 AM   Result Value Ref Range    NT Pro-BNP 3,073 (H) 5 - 125 PG/ML   EKG 12 Lead    Collection Time: 12/10/22  1:01 AM   Result Value Ref Range    Ventricular Rate 134 BPM    Atrial Rate 134 BPM    P-R Interval 164 ms    QRS Duration 84 ms    Q-T Interval 266 ms    QTc Calculation (Bazett) 397 ms    P Axis 83 degrees    R Axis 74 degrees    T Axis 180 degrees    Diagnosis Sinus tachycardia    Arterial Blood Gas, POC    Collection Time: 12/10/22  2:18 AM   Result Value Ref Range    pH, Arterial, POC 7.36 7.35 - 7.45      pCO2, Arterial, POC 42.9 35 - 45 MMHG    pO2, Arterial,  (H) 75 - 100 MMHG    HCO3, Mixed 24.4 22 - 26 MMOL/L    SO2c, Arterial, .0 (H) 95 - 98 %    BASE DEFICIT (POC) 1.0 mmol/L    POC Rodger's Test Positive      Site RIGHT RADIAL      Specimen type: ARTERIAL      Performed by: Ross     FIO2 5     Arterial Blood Gas, POC    Collection Time: 12/10/22  4:21 AM   Result Value Ref Range    DEVICE BIPAP MASK      FIO2 40 %    pH, Arterial, POC 7.39 7.35 - 7.45      pCO2, Arterial, POC 39.6 35 - 45 MMHG    pO2, Arterial,  (H) 75 - 100 MMHG    HCO3, Mixed 23.7 22 - 26 MMOL/L    SO2c, Arterial, POC 99.4 (H) 95 - 98 %    BASE DEFICIT (POC) 1.2 mmol/L    Mode BILEVEL      IPAP/PIP/High PEEP 14      POC Rodger's Test Positive      Inspiratory Time 0.9 sec    Site RIGHT RADIAL      Specimen type: ARTERIAL      Performed by: Russ Neff    CBC    Collection Time: 12/10/22  5:13 AM   Result Value Ref Range    WBC 7.8 4.3 - 11.1 K/uL    RBC 2.75 (L) 4.05 - 5.2 M/uL    Hemoglobin 8.6 (L) 11.7 - 15.4 g/dL    Hematocrit 27.8 (L) 35.8 - 46.3 %    .1 82 - 102 FL    MCH 31.3 26.1 - 32.9 PG    MCHC 30.9 (L) 31.4 - 35.0 g/dL    RDW 17.8 (H) 11.9 - 14.6 %    Platelets 430 638 - 674 K/uL    MPV 9.4 9.4 - 12.3 FL    nRBC 0.00 0.0 - 0.2 K/uL   Magnesium    Collection Time: 12/10/22  5:13 AM   Result Value Ref Range    Magnesium 1.8 1.8 - 2.4 mg/dL   Basic Metabolic Panel    Collection Time: 12/10/22  5:13 AM   Result Value Ref Range    Sodium 134 133 - 143 mmol/L    Potassium 3.6 3.5 - 5.1 mmol/L    Chloride 101 101 - 110 mmol/L    CO2 24 21 - 32 mmol/L    Anion Gap 9 2 - 11 mmol/L    Glucose 184 (H) 65 - 100 mg/dL    BUN 11 8 - 23 MG/DL    Creatinine 0.70 0.6 - 1.0 MG/DL    Est, Glom Filt Rate >60 >60 ml/min/1.73m2    Calcium 8.8 8.3 - 10.4 MG/DL   Lipid Panel    Collection Time: 12/10/22  5:13 AM   Result Value Ref Range    Cholesterol, Total 90 <200 MG/DL    Triglycerides 74 35 - 150 MG/DL    HDL 56 40 - 60 MG/DL    LDL Calculated 19.2 <100 MG/DL    VLDL Cholesterol Calculated 14.8 6.0 - 23.0 MG/DL    Chol/HDL Ratio 1.6     Anti-Xa, Unfractionated Heparin    Collection Time: 12/10/22  5:13 AM   Result Value Ref Range    Anti-XA Unfrac Heparin 0.30 0.3 - 0.7 IU/mL   Anti-Xa, Unfractionated Heparin    Collection Time: 12/10/22  3:24 PM   Result Value Ref Range    Anti-XA Unfrac Heparin 0.47 0.3 - 0.7 IU/mL   Transthoracic echocardiogram (TTE) complete with contrast, bubble, strain, and 3D PRN    Collection Time: 12/10/22  3:40 PM   Result Value Ref Range    LV EDV A2C 86 mL    LV EDV A4C 79 mL    LV ESV A2C 41 mL    LV ESV A4C 39 mL    IVSd 0.7 0.6 - 0.9 cm    LVIDd 4.7 3.9 - 5.3 cm    LVIDs 3.4 cm    LVOT Diameter 1.8 cm    LVOT Mean Gradient 1 mmHg    LVOT VTI 18.1 cm    LVOT Peak Velocity 0.8 m/s    LVOT Peak Gradient 2 mmHg    LVPWd 0.8 0.6 - 0.9 cm    LV E' Lateral Velocity 13 cm/s    LV E' Septal Velocity 8 cm/s    LV Ejection Fraction A2C 53 %    LV Ejection Fraction A4C 51 %    EF BP 52 (A) 55 - 100 %    LVOT Area 2.5 cm2    LVOT SV 46.0 ml    LA Minor Axis 5.3 cm    LA Major Riley 4.7 cm    LA Area 2C 17.4 cm2    LA Area 4C 15.2 cm2    LA Volume 2C 46 22 - 52 mL    LA Volume 4C 38 22 - 52 mL    LA Volume BP 43 22 - 52 mL    LA Diameter 3.8 cm    AR .0 ms    AR Max Velocity PISA 4.4 m/s    AV Peak Velocity 1.4 m/s    AV Peak Gradient 8 mmHg    AV Mean Velocity 1.0 m/s    AV Mean Gradient 5 mmHg    AV VTI 26.9 cm    AV Area by VTI 1.7 cm2    AV Area by Peak Velocity 1.4 cm2    Aortic Root 2.2 cm    IVC Proxmal 1.7 cm    MR Peak Velocity 5.5 m/s    MR Peak Gradient 121 mmHg    MV E Wave Deceleration Time 111.0 ms    MV A Velocity 1.43 m/s    MV E Velocity 0.70 m/s    GA Max Velocity 1.9 m/s    Pulmonary Artery EDP 14 mmHg    PV Max Velocity 1.0 m/s    PV Peak Gradient 4 mmHg    Est. RA Pressure 3 mmHg    RVIDd 3.0 cm    RV Basal Dimension 3.4 cm    RV Free Wall Peak S' 14 cm/s    TAPSE 1.9 1.7 cm    TR Max Velocity 3.12 m/s    TR Peak Gradient 39 mmHg    Body Surface Area 1.64 m2    Fractional Shortening 2D 28 28 - 44 %    LV ESV Index A4C 24 mL/m2    LV EDV Index A4C 48 mL/m2    LV ESV Index A2C 25 mL/m2    LV EDV Index A2C 52 mL/m2    LVIDd Index 2.85 cm/m2    LVIDs Index 2.06 cm/m2    LV RWT Ratio 0.34     LV Mass 2D 112.5 67 - 162 g    LV Mass 2D Index 68.2 43 - 95 g/m2    MV E/A 0.49     E/E' Ratio (Averaged) 7.07     E/E' Lateral 5.38     E/E' Septal 8.75     LA Volume Index BP 26 16 - 34 ml/m2    LVOT Stroke Volume Index 27.9 mL/m2    LA Volume Index 2C 28 16 - 34 mL/m2    LA Volume Index 4C 23 16 - 34 mL/m2    LA Size Index 2.30 cm/m2    LA/AO Root Ratio 1.73     Ao Root Index 1.33 cm/m2    AV Velocity Ratio 0.57     LVOT:AV VTI Index 0.67     ALIE/BSA VTI 1.0 cm2/m2    ALIE/BSA Peak Velocity 0.8 cm2/m2    RVSP 42 mmHg       Imaging:  Reviewed    ASSESSMENT & PLAN:  76-year-old white female history of very severe COPD on supplemental O2 (not felt to be a candidate for lung transplant in past), CAD, MI, right carotid enterectomy, under care of oncology Dr. Omer Acuna for left-sided lung adenocarcinoma, stage III s/p definitive concurrent chemo XRT and now on durvalumab therapy. Patient admitted through ER with chest discomfort and worsening shortness of breath. CT chest without evidence of PE though concern for disease progression, oncology consulted. Imaging personally reviewed, findings could certainly be related to recent therapy changes versus early progression. No clear evidence of significant new sites of disease. Results discussed with patient. She should follow-up in outpatient oncology about a week from discharge at which point role for PET scan can be considered. Lab studies and imaging studies were personally reviewed. Pertinent old records were reviewed. Thank you for allowing us to participate in the care of Ms. Teddie Skiff.               Tiki Navarrete MD  [address] [place] [address] Hematology Oncology  [address] [place] [address]  Office : [phone]  Fax : [phone]

## 2022-12-11 NOTE — PROGRESS NOTES
Davis Hyde  Admission Date: 12/9/2022         Daily Progress Note: 12/11/2022    The patient's chart is reviewed and the patient is discussed with the staff. Background: Patient is a 67 y.o.  female seen and evaluated at the request of Dr. Dana Solo. Patient has a past medical history of CAD with 15 stents per , hypertension, hyperlipidemia, non-small cell lung cancer stage III, severe COPD on home O2 2 L around-the-clock, anxiety, depression and hypothyroidism who presented to the emergency department with chest pain and shortness of breath. She has been having worsening dyspnea over the past month, increased her home O2 from 2 to 3 L due to sats in the 80s. She took nitroglycerin several times at home with relief of the chest pain. In the emergency department, she was noted to have an elevated troponin and BNP. She was admitted to the cardiology service and heparin bolus and drip was started. Once transferred to [facility], her dyspnea worsened along with wheezing. She was given an albuterol treatment with minimal improvement though she had significant tachycardia in the 140s. [place] [facility] was consulted emergently for further management of her severe dyspnea. Per the nurse, she was tripoding and severely dyspneic this evening. She has a history of very severe COPD with centrilobular emphysema, on nocturnal O2. She had a navigation bronchoscopy with attempts at biopsy of a JHONY nodule 2/11/20 which was difficult to visualize with radial probe and ultimately was non diagnostic. PET scan showed this area to have an SUV of 2.1. She was to have a bronchoscopy attempt at [facility] in July but repeat imaging performed here showed improvement in this area so the bronchoscopy has been cancelled. Was referred to [facility] for eval for lung transplant ultimately felt not to be a lung transplant candidate.   Was then seen by Dr Moises Adam with [address] and also felt not to be a transplant candidate. June 2022 she had a CT scan with increase LLL mass and L hilar lymph node. She had bronch with EBUS and nic showing adenocarcinoma in 11R. Stage III. She was seen by heme/onc. She has completed chemo/radiation and states that she tolerated this well. Pt sees us in the office for very severe COPD with chronic hypoxic respiratory failure. Deemed not a transplant candidate by [facility]- [address] [place] [address] and [address]. Dx with stage III lung cancer in 2022. Completed chemo and radiation, on immunotherapy now. Attempting to get back into pulm rehab. Takes Symbicort, spiriva, daliresp, prn albuterol at home and wears 2-3 L O2. Pt seen overnight by Dr. Juanita Mueller for increasing dyspnea and hypoxia. Pt now stable on 3L NC. Pt sitting up in bed talking on phone to family. Pt states she still feels \"out of it\", but her breathing is much better from last night. Pt states she is supposed to have cardiac rehab in a few weeks, but doesn't think she'll be well enough to attend. Pt states she wishes we could \"wave a magic wand and have her back walking around on 2 liters\". Subjective:   Pt now says she has a cough and is still wheezing some. She feels like her chest is tight and she needs to cough up sputum.      Current Facility-Administered Medications   Medication Dose Route Frequency    levalbuterol (XOPENEX) nebulizer solution 1.25 mg  1.25 mg Nebulization Q4H PRN    ipratropium (ATROVENT) 0.02 % nebulizer solution 0.5 mg  0.5 mg Nebulization TID    methylPREDNISolone sodium (SOLU-MEDROL) injection 125 mg  125 mg IntraVENous Daily    acetaminophen (TYLENOL) tablet 650 mg  650 mg Oral Q4H PRN    acyclovir (ZOVIRAX) tablet 800 mg  800 mg Oral BID    albuterol (PROVENTIL) nebulizer solution 2.5 mg  2.5 mg Nebulization Q4H PRN    aspirin chewable tablet 81 mg  81 mg Oral Daily    atorvastatin (LIPITOR) tablet 80 mg  80 mg Oral Nightly    mometasone-formoterol (DULERA) 200-5 MCG/ACT inhaler 2 puff  2 puff Inhalation BID cetirizine (ZYRTEC) tablet 10 mg  10 mg Oral Daily PRN    clopidogrel (PLAVIX) tablet 75 mg  75 mg Oral Daily    escitalopram (LEXAPRO) tablet 10 mg  10 mg Oral Daily    ezetimibe (ZETIA) tablet 10 mg  10 mg Oral Nightly    gabapentin (NEURONTIN) capsule 300 mg  300 mg Oral TID    HYDROcodone-acetaminophen (NORCO) 5-325 MG per tablet 2 tablet  2 tablet Oral Q6H PRN    magic (miracle) mouthwash with nystatin  5 mL Swish & Swallow 4x Daily PRN    methIMAzole (TAPAZOLE) tablet 5 mg  5 mg Oral Daily    metoprolol succinate (TOPROL XL) extended release tablet 100 mg  100 mg Oral Daily    ondansetron (ZOFRAN-ODT) disintegrating tablet 4 mg  4 mg Oral Q8H PRN    pantoprazole (PROTONIX) tablet 40 mg  40 mg Oral BID    Roflumilast (DALIRESP) tablet 500 mcg (Patient Supplied)  500 mcg Oral Nightly    tiotropium (SPIRIVA RESPIMAT) 2.5 MCG/ACT inhaler 2 puff  2 puff Inhalation Daily    [Held by provider] torsemide (DEMADEX) tablet 10 mg  10 mg Oral Daily    traMADol (ULTRAM) tablet 100 mg  100 mg Oral 4x daily    sodium chloride flush 0.9 % injection 5-40 mL  5-40 mL IntraVENous 2 times per day    sodium chloride flush 0.9 % injection 5-40 mL  5-40 mL IntraVENous PRN    0.9 % sodium chloride infusion   IntraVENous PRN    polyethylene glycol (GLYCOLAX) packet 17 g  17 g Oral Daily PRN    potassium chloride (KLOR-CON M) extended release tablet 40 mEq  40 mEq Oral PRN    Or    potassium bicarb-citric acid (EFFER-K) effervescent tablet 40 mEq  40 mEq Oral PRN    Or    potassium chloride 10 mEq/100 mL IVPB (Peripheral Line)  10 mEq IntraVENous PRN    magnesium sulfate 2000 mg in 50 mL IVPB premix  2,000 mg IntraVENous PRN    ondansetron (ZOFRAN) injection 4 mg  4 mg IntraVENous Q6H PRN    aluminum & magnesium hydroxide-simethicone (MAALOX) 200-200-20 MG/5ML suspension 30 mL  30 mL Oral Q6H PRN    nitroglycerin (NITRO-BID) 2 % ointment 1 inch  1 inch Topical 4 times per day    nitroGLYCERIN (NITROSTAT) SL tablet 0.4 mg  0.4 mg SubLINGual Q5 Min PRN    0.9 % sodium chloride infusion   IntraVENous Continuous    azithromycin (ZITHROMAX) tablet 500 mg  500 mg Oral Once per day on Mon Wed Fri    guaiFENesin [facility]) extended release tablet 600 mg  600 mg Oral BID    heparin (porcine) injection 3,540 Units  60 Units/kg IntraVENous PRN    heparin (porcine) injection 1,770 Units  30 Units/kg IntraVENous PRN    heparin 25,000 units in dextrose 5% 250 mL (premix) infusion  5-30 Units/kg/hr IntraVENous Continuous    LORazepam (ATIVAN) injection 1 mg  1 mg IntraVENous Q6H PRN     Review of Systems: Comprehensive ROS negative except in HPI  Objective:   Blood pressure (!) 109/51, pulse 96, temperature 97.7 °F (36.5 °C), temperature source Oral, resp. rate 16, weight 130 lb 8 oz (59.2 kg), SpO2 95 %. Intake/Output Summary (Last 24 hours) at 12/11/2022 1004  Last data filed at 12/11/2022 0715  Gross per 24 hour   Intake 480 ml   Output 400 ml   Net 80 ml     Physical Exam:   Constitutional:  the patient is well developed and in no acute distress  EENMT:  Sclera clear, pupils equal, oral mucosa moist  Respiratory: symmetric chest rise. Diminished with arnold lower lobe wheezes noted. Cardiovascular:  RRR without M,G,R. There is 1+ lower extremity edema. Gastrointestinal: soft and non-tender; with positive bowel sounds. Musculoskeletal: warm without cyanosis. Normal muscle tone. Skin:  no jaundice or rashes, no wounds   Neurologic: symmetric strength, fluent speech  Psychiatric:  calm, appropriate, oriented x 4    Imaging: I performed an independent interpretation of the patient's images.   CXR:    12/10                                                                     12/9              CT chest:         LAB:  Recent Labs     12/09/22  1233 12/09/22  2005 12/10/22  0513 12/11/22  0527   WBC 6.5  --  7.8 5.3   HGB 8.8* 8.8* 8.6* 7.6*   HCT 27.0* 27.4* 27.8* 24.4*     --  196 196   PROCAL 0.15  --   --   --      Recent Labs     12/09/22  1233 12/10/22  0513 12/11/22  0527    134 136   K 3.3* 3.6 3.8   CL 95* 101 102   CO2 24 24 30   BUN 11 11 10   CREATININE 0.56* 0.70 0.60   MG  --  1.8 2.0   BILITOT 0.4  --   --    AST 18  --   --    ALT 5*  --   --    ALKPHOS 56  --   --      Recent Labs     12/09/22  2005 12/10/22  0100   TROPHS 113.1* 123.6*   NTPROBNP  --  3,073*     Recent Labs     12/09/22  1233 12/10/22  0513 12/11/22  0527   GLUCOSE 126* 184* 116*      Microbiology:   Recent Labs     12/09/22 1233   CULTURE NO GROWTH 2 DAYS  NO GROWTH 2 DAYS     ECHO: 12/09/22    TRANSTHORACIC ECHOCARDIOGRAM (TTE) COMPLETE (CONTRAST/BUBBLE/3D PRN) 12/10/2022  7:40 PM (Final)    Interpretation Summary    Left Ventricle: Low normal left ventricular systolic function. EF by 2D Simpsons Biplane is 52%. Left ventricle size is normal. Normal wall thickness. Normal wall motion. Normal diastolic function. Right Ventricle: Right ventricle size is normal. Normal systolic function. Aortic Valve: Tricuspid valve. Mildly thickened cusps. Moderate regurgitation. AV PHT is 384.0 ms. No stenosis. Mitral Valve: Mildly thickened leaflet, at the anterior and posterior leaflets. Moderate to severe regurgitation. Tricuspid Valve: Mild to moderate regurgitation. The estimated RVSP is 42 mmHg. Right Atrium: Right atrium size is normal.  Echodense structure noted in the right atrium may represent indwelling central venous catheter. IVC/SVC: IVC diameter is less than or equal to 21 mm and decreases greater than 50% during inspiration; therefore the estimated right atrial pressure is normal (~3 mmHg). Technical qualifiers: Technically difficult study, color flow Doppler was performed and pulse wave and/or continuous wave Doppler was performed. Signed by: Galina Perez DO on 12/10/2022  7:40 PM    Assessment and Plan:  (Medical Decision Making)   Principal Problem:   Chest pain  Plan: Repeat EKG and troponin along with BNP.   Initial troponin elevated at 113, heparin drip is going.  mg and Nitropaste. Per cards, elevated troponin not consistent with acute coronary syndrome likely related to demand ischemia no invasive work-up is planned. Active Problems:    Dyspnea  Plan: No improvement after approximately 1 hour of Xopenex and Atrovent. Solu-Medrol 125 mg IV and terbutaline 0.25 mg subcu given. Patient placed on BiPAP with significant improvement in her mental status. She is now more alert. We will continue on BiPAP and reevaluate in 2 hours. Revaluate pt at 0430 per RN and repeat ABG much better and Bipap removed. Pt on 3L NC. Pt much less dyspneic this morning, but still with shortness of breath on exertion or talking. Discontinue dulera and start nebs. Start robitussin. Shingles  Plan: Per OP onc, prophylactic acyclovir for 3 months. Non-small cell lung cancer ([facility] [address]  Plan: Onc consult pending; currently on durvalumab immunotherapy monthly. Lung ca seems to be progressing with increasing growth of tumors. Onc recommends f/u OP. Essential hypertension, benign  Plan: Per primary, stable       Coronary artery disease involving native coronary artery of native heart without angina pectoris  Plan: Continue to trend troponin, repeat EKG as needed. Continue heparin. Echo pending today. Chronic respiratory failure with hypoxia ([facility] [address]  Plan: On 3L NC. Immunotherapy durvalumab does pose risk of pneumonitis? Continue solumedrol 125 mg daily. COPD, very severe ([facility] [address]  Plan: Continue spiriva, symbicort, daliresp, and albuterol prn. On 3L NC. More than 50% of the time documented was spent in face-to-face contact with the patient and in the care of the patient on the floor/unit where the patient is located.     In this split/shared evaluation I performed performed a medically appropriate history and exam, counseled and educated the patient and/or family member, ordered medications, tests or procedures, documented information in EMR, and coordinated care. which accounted for 10 minutes of clinical time. YISEL Acosta NP    In this split/shared evaluation I performed reviewed the patients's H&P, available images, labs, cultures. , discussed case in detail with NPP, performed a medically appropriate history and exam, counseled and educated the patient and/or family member, ordered and/or reviewed medications, tests or procedures, documented information in EMR, independently interpreted images, and coordinated care. which accounted for 20 minutes clinical time.      Impression:     67 y old WF with end stage COPD, chronic respiratory failure,non small cell lung ca on immunotherapy presented with CP,elevated troponin, on heparin, echo pending,per cardiology     -improving, continue steroids, nebs, add hypertonic saline   Dorethia Severe, MD

## 2022-12-11 NOTE — PROGRESS NOTES
RRT Clinical Rounding Nurse Progress Report      SUBJECTIVE: Called to assess patient secondary to RN/provider concern - increased SOB/oxygen demands & chest pain yesterday. Vitals:    12/10/22 1114 12/10/22 1252 12/10/22 1803 12/10/22 1925   BP: 123/71   124/65   Pulse: 98 96 100 (!) 101   Resp:  19 19 22   Temp: 97.8 °F (36.6 °C)   98.3 °F (36.8 °C)   TempSrc: Oral   Oral   SpO2: 98% 97% 96% 95%   Weight:            DETERIORATION INDEX SCORE: 37    ASSESSMENT:  Upon arrival to the pt's room, she was found to be A&Ox4, with an O2 saturation of 95% on 3L NC. Breath sounds are clear, no wheezing noted. Respirations are even and unlabored. She denies having any pain/discomfort. PLAN:  Will follow per RRT Clinical Rounding Program protocol.     David Mann RN  [facility]: [phone]

## 2022-12-11 NOTE — PROGRESS NOTES
[facility] CARDIOLOGY PROGRESS NOTE           12/11/2022 11:53 AM    Admit Date: 12/9/2022         Subjective: Seen by oncology yesterday however did not provide much insight into the progression of her disease. Pulmonology continues to follow her her oxygen requirement is close to her home requirement. ROS:  Cardiovascular:  As noted above    Objective:      Vitals:    12/11/22 0441 12/11/22 0545 12/11/22 0727 12/11/22 0820   BP: 115/64   (!) 109/51   Pulse: 92  92 96   Resp: 16  17 16   Temp: 97.7 °F (36.5 °C)   97.7 °F (36.5 °C)   TempSrc:    Oral   SpO2: 98%  99% 95%   Weight:  130 lb 8 oz (59.2 kg)             Physical Exam:  General: Well Developed, Well Nourished, No Acute Distress, Alert & Oriented x 3, Appropriate mood  Neck: supple, no JVD  Heart: S1S2 with RRR without murmurs or gallops  Lungs: Clear throughout auscultation bilaterally without adventitious sounds  Abd: soft, nontender, nondistended, with good bowel sounds  Ext: no edema bilaterally  Skin: warm and dry      Data Review:   Recent Labs     12/10/22  0513 12/11/22  0527    136   K 3.6 3.8   MG 1.8 2.0   BUN 11 10   WBC 7.8 5.3   HGB 8.6* 7.6*   HCT 27.8* 24.4*    196   CHOL 90  --    HDL 56  --        No results for input(s): TNIPOC in the last 72 hours. Invalid input(s): TROIQ        Assessment/Plan:     Principal Problem:    Chest pain  Active Problems:    Dyspnea    Shingles    Non-small cell lung cancer (HCC)    Hyperthyroidism    Essential hypertension, benign    Iron deficiency anemia due to chronic blood loss    Chronic anxiety    Coronary artery disease involving native coronary artery of native heart without angina pectoris    Chronic respiratory failure with hypoxia (HCC)    COPD, very severe (HCC)    DNR (do not resuscitate)    Hyperlipidemia    Moderate to severe mitral regurgitation    Depression  Resolved Problems:    * No resolved hospital problems.  *    A/P  1) chest pain seems consistent with episodes of hypoxic respiratory failure. I do not feel that these represent acute exacerbations of heart failure but rather is likely related to her worsening lung cancer. She did not respond to IV Lasix. Dynamic EKG changes including ST depressions diffusely in the setting of hypoxia is consistent with demand ischemia due to hypoxic respiratory failure. I do not feel she is a cath candidate at this time nor do I feel she is having an acute coronary syndrome. 2) Resp failure -has responded to Solu-Medrol, pulmonology following oncology to see her due to changes on chest CT. 3) Elevated troponin -not consistent with acute coronary syndrome likely related to demand ischemia no invasive work-up is planned.   4) Echo pending today    Kate Yu MD  12/11/2022 11:53 AM

## 2022-12-12 PROBLEM — J96.21 ACUTE ON CHRONIC RESPIRATORY FAILURE WITH HYPOXIA (HCC): Status: ACTIVE | Noted: 2022-01-01

## 2022-12-12 PROBLEM — J44.1 COPD EXACERBATION (HCC): Status: ACTIVE | Noted: 2022-12-12

## 2022-12-12 LAB
ANION GAP SERPL CALC-SCNC: 4 MMOL/L (ref 2–11)
BUN SERPL-MCNC: 12 MG/DL (ref 8–23)
CALCIUM SERPL-MCNC: 9.1 MG/DL (ref 8.3–10.4)
CHLORIDE SERPL-SCNC: 101 MMOL/L (ref 101–110)
CO2 SERPL-SCNC: 31 MMOL/L (ref 21–32)
CREAT SERPL-MCNC: 0.6 MG/DL (ref 0.6–1)
ERYTHROCYTE [DISTWIDTH] IN BLOOD BY AUTOMATED COUNT: 17.8 % (ref 11.9–14.6)
GLUCOSE SERPL-MCNC: 138 MG/DL (ref 65–100)
HCT VFR BLD AUTO: 26.2 % (ref 35.8–46.3)
HCT VFR BLD AUTO: 26.2 % (ref 35.8–46.3)
HGB BLD-MCNC: 8.2 G/DL (ref 11.7–15.4)
HGB BLD-MCNC: 8.2 G/DL (ref 11.7–15.4)
MAGNESIUM SERPL-MCNC: 2.2 MG/DL (ref 1.8–2.4)
MCH RBC QN AUTO: 31.5 PG (ref 26.1–32.9)
MCHC RBC AUTO-ENTMCNC: 31.3 G/DL (ref 31.4–35)
MCV RBC AUTO: 100.8 FL (ref 82–102)
NRBC # BLD: 0 K/UL (ref 0–0.2)
PLATELET # BLD AUTO: 206 K/UL (ref 150–450)
PMV BLD AUTO: 9.6 FL (ref 9.4–12.3)
POTASSIUM SERPL-SCNC: 4.6 MMOL/L (ref 3.5–5.1)
RBC # BLD AUTO: 2.6 M/UL (ref 4.05–5.2)
SODIUM SERPL-SCNC: 136 MMOL/L (ref 133–143)
WBC # BLD AUTO: 5.2 K/UL (ref 4.3–11.1)

## 2022-12-12 PROCEDURE — 2700000000 HC OXYGEN THERAPY PER DAY

## 2022-12-12 PROCEDURE — 6370000000 HC RX 637 (ALT 250 FOR IP): Performed by: PHYSICIAN ASSISTANT

## 2022-12-12 PROCEDURE — 97161 PT EVAL LOW COMPLEX 20 MIN: CPT

## 2022-12-12 PROCEDURE — 99232 SBSQ HOSP IP/OBS MODERATE 35: CPT | Performed by: INTERNAL MEDICINE

## 2022-12-12 PROCEDURE — 6360000002 HC RX W HCPCS: Performed by: INTERNAL MEDICINE

## 2022-12-12 PROCEDURE — 85027 COMPLETE CBC AUTOMATED: CPT

## 2022-12-12 PROCEDURE — 80048 BASIC METABOLIC PNL TOTAL CA: CPT

## 2022-12-12 PROCEDURE — 85018 HEMOGLOBIN: CPT

## 2022-12-12 PROCEDURE — 6360000002 HC RX W HCPCS: Performed by: EMERGENCY MEDICINE

## 2022-12-12 PROCEDURE — 2580000003 HC RX 258: Performed by: NURSE PRACTITIONER

## 2022-12-12 PROCEDURE — 1100000003 HC PRIVATE W/ TELEMETRY

## 2022-12-12 PROCEDURE — 97530 THERAPEUTIC ACTIVITIES: CPT

## 2022-12-12 PROCEDURE — 2580000003 HC RX 258: Performed by: INTERNAL MEDICINE

## 2022-12-12 PROCEDURE — 97535 SELF CARE MNGMENT TRAINING: CPT

## 2022-12-12 PROCEDURE — 83735 ASSAY OF MAGNESIUM: CPT

## 2022-12-12 PROCEDURE — 6370000000 HC RX 637 (ALT 250 FOR IP): Performed by: NURSE PRACTITIONER

## 2022-12-12 PROCEDURE — 94640 AIRWAY INHALATION TREATMENT: CPT

## 2022-12-12 PROCEDURE — 97166 OT EVAL MOD COMPLEX 45 MIN: CPT

## 2022-12-12 PROCEDURE — 97112 NEUROMUSCULAR REEDUCATION: CPT

## 2022-12-12 RX ORDER — DIMETHICONE, CAMPHOR (SYNTHETIC), MENTHOL, AND PHENOL 1.1; .5; .625; .5 G/100G; G/100G; G/100G; G/100G
OINTMENT TOPICAL PRN
Status: DISCONTINUED | OUTPATIENT
Start: 2022-12-12 | End: 2022-12-21 | Stop reason: HOSPADM

## 2022-12-12 RX ADMIN — ACYCLOVIR 800 MG: 800 TABLET ORAL at 08:42

## 2022-12-12 RX ADMIN — ACYCLOVIR 800 MG: 800 TABLET ORAL at 21:29

## 2022-12-12 RX ADMIN — ESCITALOPRAM OXALATE 10 MG: 10 TABLET ORAL at 08:42

## 2022-12-12 RX ADMIN — BUDESONIDE 500 MCG: 0.5 INHALANT RESPIRATORY (INHALATION) at 19:51

## 2022-12-12 RX ADMIN — AZITHROMYCIN MONOHYDRATE 500 MG: 250 TABLET ORAL at 21:34

## 2022-12-12 RX ADMIN — GUAIFENESIN 600 MG: 600 TABLET ORAL at 21:29

## 2022-12-12 RX ADMIN — BUDESONIDE 500 MCG: 0.5 INHALANT RESPIRATORY (INHALATION) at 07:10

## 2022-12-12 RX ADMIN — GUAIFENESIN SYRUP AND DEXTROMETHORPHAN 5 ML: 100; 10 SYRUP ORAL at 21:30

## 2022-12-12 RX ADMIN — TRAMADOL HYDROCHLORIDE 100 MG: 50 TABLET ORAL at 08:42

## 2022-12-12 RX ADMIN — TIOTROPIUM BROMIDE INHALATION SPRAY 2 PUFF: 3.12 SPRAY, METERED RESPIRATORY (INHALATION) at 07:10

## 2022-12-12 RX ADMIN — METHYLPREDNISOLONE SODIUM SUCCINATE 60 MG: 125 INJECTION, POWDER, FOR SOLUTION INTRAMUSCULAR; INTRAVENOUS at 21:30

## 2022-12-12 RX ADMIN — TRAMADOL HYDROCHLORIDE 100 MG: 50 TABLET ORAL at 17:21

## 2022-12-12 RX ADMIN — METOPROLOL SUCCINATE 100 MG: 100 TABLET, EXTENDED RELEASE ORAL at 08:42

## 2022-12-12 RX ADMIN — TRAMADOL HYDROCHLORIDE 100 MG: 50 TABLET ORAL at 21:30

## 2022-12-12 RX ADMIN — IPRATROPIUM BROMIDE 0.5 MG: 0.5 SOLUTION RESPIRATORY (INHALATION) at 07:10

## 2022-12-12 RX ADMIN — CLOPIDOGREL BISULFATE 75 MG: 75 TABLET ORAL at 08:42

## 2022-12-12 RX ADMIN — SODIUM CHLORIDE, PRESERVATIVE FREE 10 ML: 5 INJECTION INTRAVENOUS at 21:32

## 2022-12-12 RX ADMIN — SODIUM CHLORIDE, PRESERVATIVE FREE 10 ML: 5 INJECTION INTRAVENOUS at 08:44

## 2022-12-12 RX ADMIN — GABAPENTIN 300 MG: 300 CAPSULE ORAL at 08:42

## 2022-12-12 RX ADMIN — TRAMADOL HYDROCHLORIDE 100 MG: 50 TABLET ORAL at 13:17

## 2022-12-12 RX ADMIN — GUAIFENESIN SYRUP AND DEXTROMETHORPHAN 5 ML: 100; 10 SYRUP ORAL at 17:24

## 2022-12-12 RX ADMIN — GABAPENTIN 300 MG: 300 CAPSULE ORAL at 13:17

## 2022-12-12 RX ADMIN — ASPIRIN 81 MG: 81 TABLET, CHEWABLE ORAL at 08:42

## 2022-12-12 RX ADMIN — GABAPENTIN 300 MG: 300 CAPSULE ORAL at 21:30

## 2022-12-12 RX ADMIN — METHIMAZOLE 5 MG: 5 TABLET ORAL at 08:42

## 2022-12-12 RX ADMIN — EZETIMIBE 10 MG: 10 TABLET ORAL at 21:30

## 2022-12-12 RX ADMIN — ATORVASTATIN CALCIUM 80 MG: 80 TABLET, FILM COATED ORAL at 21:29

## 2022-12-12 RX ADMIN — PANTOPRAZOLE SODIUM 40 MG: 40 TABLET, DELAYED RELEASE ORAL at 21:29

## 2022-12-12 RX ADMIN — Medication 4 ML: at 19:51

## 2022-12-12 RX ADMIN — PANTOPRAZOLE SODIUM 40 MG: 40 TABLET, DELAYED RELEASE ORAL at 08:42

## 2022-12-12 RX ADMIN — ALBUTEROL SULFATE 2.5 MG: 2.5 SOLUTION RESPIRATORY (INHALATION) at 19:51

## 2022-12-12 RX ADMIN — ALBUTEROL SULFATE 2.5 MG: 2.5 SOLUTION RESPIRATORY (INHALATION) at 07:10

## 2022-12-12 RX ADMIN — METHYLPREDNISOLONE SODIUM SUCCINATE 60 MG: 125 INJECTION, POWDER, FOR SOLUTION INTRAMUSCULAR; INTRAVENOUS at 08:41

## 2022-12-12 RX ADMIN — Medication 4 ML: at 07:10

## 2022-12-12 RX ADMIN — GUAIFENESIN 600 MG: 600 TABLET ORAL at 08:44

## 2022-12-12 ASSESSMENT — PAIN SCALES - GENERAL
PAINLEVEL_OUTOF10: 0

## 2022-12-12 NOTE — PROGRESS NOTES
Pt's urine color changed today around noon from yellow to dark cassandra and hazy. Order placed for UA to assess for presence of blood. UA returned + for blood. See Lab. Notified Raymond Walker MD. Order placed for H&H now and CBC already order for tomorrow AM. Notified incoming RN to monitor results and notify MD of abnormal results.

## 2022-12-12 NOTE — PROGRESS NOTES
ACUTE OCCUPATIONAL THERAPY GOALS:   (Developed with and agreed upon by patient and/or caregiver.)  1. Pt will complete LB ADL CGA with AE as needed. 2. Pt will complete toileting CGA with AE as needed. 3. Pt will complete UB ADL supervision. 4. Pt will tolerate 25 minutes of OT treatment requiring 2-3 breaks as needed. 5. Pt will complete grooming tasks while standing at sink Min A.  6. Pt will complete functional mobility via RW CGA. 7. Pt will tolerate BUE exercises to increase strength for safe, functional transfers, ADL participation. 8. Pt will self initiate using energy conservation techniques during functional mobility and ADLs 100% of the time. Timeframe: 7 days    OCCUPATIONAL THERAPY Initial Assessment          Acknowledge Orders  Time  OT Charge Capture  Rehab Caseload Tracker      John Titus is a 67 y.o. female   PRIMARY DIAGNOSIS: Chest pain  Chest pain [R07.9]       Reason for Referral: Generalized Muscle Weakness (M62.81)  Inpatient: Payor: MEDICARE / Plan: MEDICARE PART A AND B / Product Type: *No Product type* /     ASSESSMENT:     REHAB RECOMMENDATIONS:   Recommendation to date pending progress:  Setting:  Home Health Therapy    Equipment:    To Be Determined     ASSESSMENT:  Ms. Alexandre Butler is a 67 y F with a hx of severe COPD, CAD, L lung adenocarcinoma, anxiety. Pt was admitted for chest pain. Pt was received supine in bed on 2.5 L O2, 94%. Pt wears 2L at BL. Pt anxiety with all movement in regards to oxygen requirements and breathing. Pt required assistance for functional mobility and ADLs today due to generalized weakness, oxygen desaturation. Pt dropped to 88% on 2.5L during functional activity. Pt recovered to 94% on 2.5 L. Pt could not stand to perform ADLs today due to poor activity tolerance. Due to anxiety, medical prognosis, poor oxygen reserve pt would not tolerate STR well. Pt has deficits in strength, balance, activity tolerance, and performing ADLs. Pt requires continued skilled OT services due to performing functionally below baseline. [address] [facility] [address] [address] [facility] 6 Clicks Daily Activity Inpatient Short Form:    AM-PAC Daily Activity Inpatient   How much help for putting on and taking off regular lower body clothing?: A Lot  How much help for Bathing?: A Lot  How much help for Toileting?: A Lot  How much help for putting on and taking off regular upper body clothing?: A Little  How much help for taking care of personal grooming?: None  How much help for eating meals?: None  AM-[facility] Inpatient Daily Activity Raw Score: 17  AM-PAC Inpatient ADL T-Scale Score : 37.26  ADL Inpatient CMS 0-100% Score: 50.11  ADL Inpatient CMS G-Code Modifier : CK           SUBJECTIVE:     Ms. Saint Friday states, \"I need complete help to shower at home. \"     Social/Functional Lives With: Spouse  Type of Home: House  Home Layout: One level  Bathroom Shower/Tub: Walk-in shower  Bathroom Equipment: Grab bars in shower, Shower chair  Home Equipment: Snapette, rolling, Oxygen  Receives Help From: Family  ADL Assistance: Needs assistance  Ambulation Assistance: Needs assistance  Occupation: Retired    OBJECTIVE:     LINES / [place] / AIRWAY: Continuous Pulse Oximetry    RESTRICTIONS/PRECAUTIONS:       PAIN: Yuli Melgoza / O2:   Pre Treatment:   Pain Assessment: None - Denies Pain      Post Treatment: none       Vitals          Oxygen   See assessment         GROSS EVALUATION: INTACT IMPAIRED   (See Comments)   UE AROM WFL[] []   UE PROM [] []   Strength []  Generally weak     Posture / Balance [] Sitting - Static: Fair, +, Good, -  Sitting - Dynamic:  (F/F+)  Standing - Static: Fair  Standing - Dynamic: Fair   Sensation []     Coordination []       Tone []  Tremors BUE from medication     Edema [x]    Activity Tolerance [] Patient limited by fatigue     Hand Dominance R [] L []      COGNITION/  PERCEPTION: INTACT IMPAIRED   (See Comments)   Orientation [x]     Vision []  Reading glasses Hearing [x]     Cognition  []  anxious   Perception []       MOBILITY: I Mod I S SBA CGA Min Mod Max Total  NT x2 Comments:   Bed Mobility    Rolling [] [] [] [] [] [] [] [] [] [] []    Supine to Sit [] [] [] [x] [] [] [] [] [] [] [] HOB elevated   Scooting [] [] [] [] [] [] [] [] [] [] []    Sit to Supine [] [] [] [] [] [] [] [] [] [] []    Transfers    Sit to Stand [] [] [] [] [] [x] [] [] [] [] [x]    Bed to Chair [] [] [] [] [] [x] [] [] [] [] [x]    Stand to Sit [] [] [] [] [] [x] [] [] [] [] [x]    Tub/Shower [] [] [] [] [] [] [] [] [] [] []     Toilet [] [] [] [] [] [] [] [] [] [] []      [] [] [] [] [] [] [] [] [] [] []    I=Independent, Mod I=Modified Independent, S=Supervision/Setup, SBA=Standby Assistance, CGA=Contact Guard Assistance, Min=Minimal Assistance, Mod=Moderate Assistance, Max=Maximal Assistance, Total=Total Assistance, NT=Not Tested    ACTIVITIES OF DAILY LIVING: I Mod I S SBA CGA Min Mod Max Total NT Comments   BASIC ADLs:              Upper Body Bathing  [] [] [] [] [] [] [] [] [] []    Lower Body Bathing [] [] [] [] [] [] [x] [] [] [] Seated EOB compression socks due to fatigue   Toileting [] [] [] [] [] [] [] [] [] []    Upper Body Dressing [] [] [] [] [] [] [] [] [] []    Lower Body Dressing [] [] [] [] [] [] [] [] [] []    Feeding [] [] [] [] [] [] [] [] [] []    Grooming [] [] [x] [] [] [] [] [] [] [] Seated washing face, brushing teeth   Personal Device Care [] [] [] [] [] [] [] [] [] []    Functional Mobility [] [] [] [] [] [x] [] [] [] [] X2 HHA   I=Independent, Mod I=Modified Independent, S=Supervision/Setup, SBA=Standby Assistance, CGA=Contact Guard Assistance, Min=Minimal Assistance, Mod=Moderate Assistance, Max=Maximal Assistance, Total=Total Assistance, NT=Not Tested    PLAN:   FREQUENCY/DURATION     for duration of hospital stay or until stated goals are met, whichever comes first.    PROBLEM LIST:   (Skilled intervention is medically necessary to address:)  Decreased ADL/Functional Activities  Decreased Activity Tolerance  Decreased Balance  Decreased Gait Ability  Decreased Strength  Decreased Transfer Abilities   INTERVENTIONS PLANNED:  (Benefits and precautions of occupational therapy have been discussed with the patient.)  Self Care Training  Therapeutic Activity  Therapeutic Exercise/HEP  Neuromuscular Re-education  Gait Training  Manual Therapy  Education         TREATMENT:     EVALUATION: MODERATE COMPLEXITY: (Untimed Charge)    TREATMENT:   Co-Treatment PT/OT necessary due to patient's decreased overall endurance/tolerance levels, as well as need for high level skilled assistance to complete functional transfers/mobility and functional tasks  Neuromuscular Re-education (15 Minutes): Neuromuscular Re-education included Balance Training, Coordination training, Functional mobility with facilitation, Postural training, Sitting balance training, and Standing balance training to improve Balance, Coordination, and Functional Mobility. Self Care (15 minutes): Patient participated in lower body dressing and grooming ADLs in supported sitting and unsupported sitting with moderate verbal and manual cueing to increase independence, decrease assistance required, increase activity tolerance, and increase safety awareness. Patient also participated in functional mobility, functional transfer, and energy conservation training to increase independence, decrease assistance required, increase activity tolerance, and increase safety awareness. TREATMENT GRID:  N/A    AFTER TREATMENT PRECAUTIONS: Bed/Chair Locked, Call light within reach, Chair, Needs within reach, RN notified, and Visitors at bedside    INTERDISCIPLINARY COLLABORATION:  RN/ PCT, PT/ PTA, and OT/ LAU    EDUCATION:  Education Given To: Patient; Family  Education Provided: Role of Therapy; Energy Conservation; Fall Prevention Strategies; Plan of Care;Transfer Training  Education Outcome: Verbalized understanding    TOTAL TREATMENT DURATION AND TIME:  Time In: 1101  Time Out: [address]  Minutes: [address], OT

## 2022-12-12 NOTE — PROGRESS NOTES
Davis Mendez [address]  Admission Date: 12/9/2022         Daily Progress Note: 12/12/2022    The patient's chart is reviewed and the patient is discussed with the staff. Background: 76yo WF with h/o CAD s/p 15 stents, HTN, HLD, nsclc stage III (copmleted chemo/radiation, on immunotherapy), very severe COPD (evaluated and turned down for transplant x 2) with chronic hypoxic respiratory failure on 2L all the time, anxiety/depression, presented to ER with chest pain and SOB. Took nitro at home with relief of chest pain. Elevated troponin and BNP in ER. Admitted to cardiology service. Heparin drip strted. Worsened wheezing and pulm consulted. On symbicort, spiriva, daliresp at home. Subjective:     Pt states that overall she is feeling better though still seems far from baseline. More POTTS. Not able to walk to bathroom like she could just a few weeks ago. Ongoing cough with gray sputum. No fever, chills.      Current Facility-Administered Medications   Medication Dose Route Frequency    medicated lip ointment (BLISTEX)   Topical PRN    guaiFENesin-dextromethorphan (ROBITUSSIN DM) 100-10 MG/5ML syrup 5 mL  5 mL Oral Q4H PRN    methylPREDNISolone sodium (SOLU-MEDROL) injection 60 mg  60 mg IntraVENous Q12H    budesonide (PULMICORT) nebulizer suspension 500 mcg  0.5 mg Nebulization BID    sodium chloride (Inhalant) 3 % nebulizer solution 4 mL  4 mL Nebulization BID    albuterol (PROVENTIL) nebulizer solution 2.5 mg  2.5 mg Nebulization Q6H WA    HYDROcodone homatropine (HYCODAN) 5-1.5 MG/5ML solution 5 mL  5 mL Oral Q4H PRN    levalbuterol (XOPENEX) nebulizer solution 1.25 mg  1.25 mg Nebulization Q4H PRN    ipratropium (ATROVENT) 0.02 % nebulizer solution 0.5 mg  0.5 mg Nebulization TID    acetaminophen (TYLENOL) tablet 650 mg  650 mg Oral Q4H PRN    acyclovir (ZOVIRAX) tablet 800 mg  800 mg Oral BID    albuterol (PROVENTIL) nebulizer solution 2.5 mg  2.5 mg Nebulization Q4H PRN    aspirin chewable tablet 81 mg  81 mg Oral Daily    atorvastatin (LIPITOR) tablet 80 mg  80 mg Oral Nightly    cetirizine (ZYRTEC) tablet 10 mg  10 mg Oral Daily PRN    clopidogrel (PLAVIX) tablet 75 mg  75 mg Oral Daily    escitalopram (LEXAPRO) tablet 10 mg  10 mg Oral Daily    ezetimibe (ZETIA) tablet 10 mg  10 mg Oral Nightly    gabapentin (NEURONTIN) capsule 300 mg  300 mg Oral TID    HYDROcodone-acetaminophen (NORCO) 5-325 MG per tablet 2 tablet  2 tablet Oral Q6H PRN    magic (miracle) mouthwash with nystatin  5 mL Swish & Swallow 4x Daily PRN    methIMAzole (TAPAZOLE) tablet 5 mg  5 mg Oral Daily    metoprolol succinate (TOPROL XL) extended release tablet 100 mg  100 mg Oral Daily    ondansetron (ZOFRAN-ODT) disintegrating tablet 4 mg  4 mg Oral Q8H PRN    pantoprazole (PROTONIX) tablet 40 mg  40 mg Oral BID    Roflumilast (DALIRESP) tablet 500 mcg (Patient Supplied)  500 mcg Oral Nightly    tiotropium (SPIRIVA RESPIMAT) 2.5 MCG/ACT inhaler 2 puff  2 puff Inhalation Daily    [Held by provider] torsemide (DEMADEX) tablet 10 mg  10 mg Oral Daily    traMADol (ULTRAM) tablet 100 mg  100 mg Oral 4x daily    sodium chloride flush 0.9 % injection 5-40 mL  5-40 mL IntraVENous 2 times per day    sodium chloride flush 0.9 % injection 5-40 mL  5-40 mL IntraVENous PRN    0.9 % sodium chloride infusion   IntraVENous PRN    polyethylene glycol (GLYCOLAX) packet 17 g  17 g Oral Daily PRN    potassium chloride (KLOR-CON M) extended release tablet 40 mEq  40 mEq Oral PRN    Or    potassium bicarb-citric acid (EFFER-K) effervescent tablet 40 mEq  40 mEq Oral PRN    Or    potassium chloride 10 mEq/100 mL IVPB (Peripheral Line)  10 mEq IntraVENous PRN    magnesium sulfate 2000 mg in 50 mL IVPB premix  2,000 mg IntraVENous PRN    ondansetron (ZOFRAN) injection 4 mg  4 mg IntraVENous Q6H PRN    aluminum & magnesium hydroxide-simethicone (MAALOX) 200-200-20 MG/5ML suspension 30 mL  30 mL Oral Q6H PRN    nitroGLYCERIN (NITROSTAT) SL tablet 0.4 mg  0.4 mg SubLINGual Q5 Min PRN    0.9 % sodium chloride infusion   IntraVENous Continuous    azithromycin (ZITHROMAX) tablet 500 mg  500 mg Oral Once per day on Mon Wed Fri    guaiFENesin [facility]) extended release tablet 600 mg  600 mg Oral BID    heparin (porcine) injection 3,540 Units  60 Units/kg IntraVENous PRN    heparin (porcine) injection 1,770 Units  30 Units/kg IntraVENous PRN    heparin 25,000 units in dextrose 5% 250 mL (premix) infusion  5-30 Units/kg/hr IntraVENous Continuous    LORazepam (ATIVAN) injection 1 mg  1 mg IntraVENous Q6H PRN     Review of Systems: Comprehensive ROS negative except in HPI  Objective:   Blood pressure 139/65, pulse (!) 101, temperature 97.7 °F (36.5 °C), temperature source Oral, resp. rate 18, weight 129 lb 3 oz (58.6 kg), SpO2 97 %. Intake/Output Summary (Last 24 hours) at 12/12/2022 1110  Last data filed at 12/12/2022 0739  Gross per 24 hour   Intake 660 ml   Output 1600 ml   Net -940 ml     Physical Exam:   Constitutional:  the patient is well developed and in no acute distress  EENMT:  Sclera clear, pupils equal, oral mucosa moist  Respiratory: symmetric chest rise. L sided exp wheeze  Cardiovascular:  RRR without M,G,R. There is no lower extremity edema. Gastrointestinal: soft and non-tender; with positive bowel sounds. Musculoskeletal: warm without cyanosis. Normal muscle tone. Skin:  no jaundice or rashes, no wounds   Neurologic: symmetric strength, fluent speech  Psychiatric:  calm, appropriate, oriented x 4    Imaging: I performed an independent interpretation of the patient's images.   CXR: 12/10/22      CT Chest 12/9/22      LAB:  Recent Labs     12/09/22  1233 12/09/22  2005 12/10/22  0513 12/11/22  0527 12/12/22  0246 12/12/22  0543   WBC 6.5  --  7.8 5.3  --  5.2   HGB 8.8*   < > 8.6* 7.6* 8.2* 8.2*   HCT 27.0*   < > 27.8* 24.4* 26.2* 26.2*     --  196 196  --  206   PROCAL 0.15  --   --   --   --   --     < > = values in this interval not displayed. Recent Labs     12/09/22  1233 12/10/22  0513 12/11/22  0527 12/12/22  0543    134 136 136   K 3.3* 3.6 3.8 4.6   CL 95* 101 102 101   CO2 24 24 30 31   BUN 11 11 10 12   CREATININE 0.56* 0.70 0.60 0.60   MG  --  1.8 2.0 2.2   BILITOT 0.4  --   --   --    AST 18  --   --   --    ALT 5*  --   --   --    ALKPHOS 56  --   --   --      Recent Labs     12/09/22  2005 12/10/22  0100   TROPHS 113.1* 123.6*   NTPROBNP  --  3,073*     Recent Labs     12/10/22  0513 12/11/22  0527 12/12/22  0543   GLUCOSE 184* 116* 138*      Microbiology:   Recent Labs     12/09/22 1233   CULTURE NO GROWTH 3 DAYS  NO GROWTH 3 DAYS     ECHO: 12/09/22    TRANSTHORACIC ECHOCARDIOGRAM (TTE) COMPLETE (CONTRAST/BUBBLE/3D PRN) 12/10/2022  7:40 PM (Final)    Interpretation Summary    Left Ventricle: Low normal left ventricular systolic function. EF by 2D Simpsons Biplane is 52%. Left ventricle size is normal. Normal wall thickness. Normal wall motion. Normal diastolic function. Right Ventricle: Right ventricle size is normal. Normal systolic function. Aortic Valve: Tricuspid valve. Mildly thickened cusps. Moderate regurgitation. AV PHT is 384.0 ms. No stenosis. Mitral Valve: Mildly thickened leaflet, at the anterior and posterior leaflets. Moderate to severe regurgitation. Tricuspid Valve: Mild to moderate regurgitation. The estimated RVSP is 42 mmHg. Right Atrium: Right atrium size is normal.  Echodense structure noted in the right atrium may represent indwelling central venous catheter. IVC/SVC: IVC diameter is less than or equal to 21 mm and decreases greater than 50% during inspiration; therefore the estimated right atrial pressure is normal (~3 mmHg). Technical qualifiers: Technically difficult study, color flow Doppler was performed and pulse wave and/or continuous wave Doppler was performed.     Signed by: Rhina Phillips DO on 12/10/2022  7:40 PM    Assessment and Plan:  (Medical Decision Making) Principal Problem:    Chest pain  Plan: heparin drip now off. Getting plavix, asa, nitropaste. Cards feels likely demand ischemia. Active Problems:    Dyspnea  Plan: treating for COPD exacerbation. Shingles  Plan: on acyclovir. Non-small cell lung cancer ([facility]  Plan: Plan: Onc has seen with CT concerning for progression of cancer on th eleft. Currently on durvalumab immunotherapy monthly. Onc recommends f/u OP and will consider PET scan at that time to help determine progression of disease vs post radiation changes. Acute on Chronic respiratory failure with hypoxia (HCC)  Plan: due to copd exacerbation. Was placed on bipap due to increased WOB but abg without hypercarbia. Nearly back to home amount of O2 though still feels more SOB than usual.       COPD, very severe ([facility]  Plan: getting solumedrol q12, albuterol, pulmicort, azithro, atrovent. On tiw azithro and daliresp as home. DNR (do not resuscitate)  Plan: appropriate. More than 50% of the time documented was spent in face-to-face contact with the patient and in the care of the patient on the floor/unit where the patient is located.     Holly Baker MD

## 2022-12-12 NOTE — THERAPY EVALUATION
safety. Will continue to follow.      [address] [facility] [address] [address] AM-PAC 6 Clicks Basic Mobility Inpatient Short Form  AM-PAC Mobility Inpatient   How much difficulty turning over in bed?: A Little  How much difficulty sitting down on / standing up from a chair with arms?: A Little  How much difficulty moving from lying on back to sitting on side of bed?: A Little  How much help from another person moving to and from a bed to a chair?: A Little  How much help from another person needed to walk in hospital room?: A Little  How much help from another person for climbing 3-5 steps with a railing?: A Little  [facility] 18  AM-PAC Inpatient T-Scale Score : 43.63  Mobility Inpatient CMS 0-100% Score: 46.58  Mobility Inpatient CMS G-Code Modifier : CK    SUBJECTIVE:   Ms. Tejal De Oliveira states, \"I have to go slow\"     Social/Functional Lives With: Spouse  Type of Home: House  Home Layout: One level  Bathroom Shower/Tub: Walk-in shower  Bathroom Equipment: Grab bars in shower, Shower chair  Home Equipment: Nadine Labala, [employer], rolling, Oxygen  Receives Help From: Family  ADL Assistance: Needs assistance  Ambulation Assistance: Needs assistance  Occupation: Retired    OBJECTIVE:     PAIN: Raul Irizarry / O2: PRECAUTION / Antonio Anderson / Elissa Villafana:   Pre Treatment: None         Post Treatment: None Vitals    SpO2 91-95%    Oxygen   3L O2, nasal cannula   Continuous Pulse Oximetry, Purewick, and Telemetry     RESTRICTIONS/PRECAUTIONS:  Restrictions/Precautions: Fall Risk                 GROSS EVALUATION: Intact Impaired (Comments):   AROM [x]     PROM [x]    Strength []  BLE strength grossly 3+/5   Balance [] Posture: Fair  Sitting - Static: Good  Sitting - Dynamic: Good  Standing - Static: Fair  Standing - Dynamic: Fair   Posture [] Forward Head  Rounded Shoulders  Thoracic Kyphosis   Sensation [x]     Coordination [x]      Tone [x]     Edema [x]    Activity Tolerance []  Dyspnea and cough with exertion    [] COGNITION/  PERCEPTION: Intact Impaired (Comments):   Orientation [x]     Vision [x]     Hearing [x]     Cognition  [x]       MOBILITY: I Mod I S SBA CGA Min Mod Max Total  NT x2 Comments:   Bed Mobility    Rolling [] [] [] [] [] [] [] [] [] [x] []    Supine to Sit [] [] [] [] [x] [] [] [] [] [] []    Scooting [] [] [] [] [x] [] [] [] [] [] []    Sit to Supine [] [] [] [] [] [] [] [] [] [x] []    Transfers    Sit to Stand [] [] [] [] [] [x] [] [] [] [] [x]    Bed to Chair [] [] [] [] [] [x] [] [] [] [] [x] SPT with HHA   Stand to Sit [] [] [] [] [] [x] [] [] [] [] [x]     [] [] [] [] [] [] [] [] [] [] []    I=Independent, Mod I=Modified Independent, S=Supervision, SBA=Standby Assistance, CGA=Contact Guard Assistance,   Min=Minimal Assistance, Mod=Moderate Assistance, Max=Maximal Assistance, Total=Total Assistance, NT=Not Tested    GAIT: I Mod I S SBA CGA Min Mod Max Total  NT x2 Comments:   Level of Assistance [] [] [] [] [] [x] [] [] [] [] [x]    Distance 5  feet    DME BHHA    Gait Quality Decreased german , Decreased step clearance, Decreased step length, Shuffling , Trunk flexion, and Trunk sway increased    Weightbearing Status Restrictions/Precautions  Restrictions/Precautions: Fall Risk    Stairs      I=Independent, Mod I=Modified Independent, S=Supervision, SBA=Standby Assistance, CGA=Contact Guard Assistance,   Min=Minimal Assistance, Mod=Moderate Assistance, Max=Maximal Assistance, Total=Total Assistance, NT=Not Tested    PLAN:   FREQUENCY AND DURATION: 3 times/week for duration of hospital stay or until stated goals are met, whichever comes first.    THERAPY PROGNOSIS: Good    PROBLEM LIST:   (Skilled intervention is medically necessary to address:)  Decreased ADL/Functional Activities  Decreased Activity Tolerance  Decreased Balance  Decreased Gait Ability  Decreased Strength  Decreased Transfer Abilities INTERVENTIONS PLANNED:   (Benefits and precautions of physical therapy have been discussed with the patient.)  Therapeutic Activity  Therapeutic Exercise/HEP  Neuromuscular Re-education  Gait Training  Education       TREATMENT:   EVALUATION: LOW COMPLEXITY: (Untimed Charge)    TREATMENT:   Co-Treatment PT/OT necessary due to patient's decreased overall endurance/tolerance levels, as well as need for high level skilled assistance to complete functional transfers/mobility and functional tasks  Therapeutic Activity (14 Minutes): Therapeutic activity included Supine to Sit, Scooting, Lateral Scooting, Transfer Training, Ambulation on level ground, Sitting balance , and Standing balance to improve functional Activity tolerance, Balance, Mobility, and Strength.     TREATMENT GRID:  N/A    AFTER TREATMENT PRECAUTIONS: Bed/Chair Locked, Call light within reach, Chair, RN notified, and OT at bedside    INTERDISCIPLINARY COLLABORATION:  RN/ PCT and OT/ LAU    EDUCATION: Education Given To: Patient  Education Provided: Role of Therapy;Plan of Care  Education Method: Verbal  Barriers to Learning: None  Education Outcome: Verbalized understanding    TIME IN/OUT:  Time In: 1107  Time Out: 1 Fazal Koenig  Minutes: [address], PT

## 2022-12-12 NOTE — PROGRESS NOTES
12/12/2022 9:35 AM    Admit Date: 12/9/2022    Admit Diagnosis: Chest pain [R07.9]      Subjective:   Cp with coughing- breathing better      Objective:    /65   Pulse (!) 101   Temp 97.7 °F (36.5 °C) (Oral)   Resp 18   Wt 129 lb 3 oz (58.6 kg)   SpO2 97%   BMI 21.50 kg/m²     Physical Exam:  Banks N[place]ly, Well Nourished, No Acute Distress, Alert & Oriented x 3, appropriate mood. Neck- supple, no JVD  CV- regular rate and rhythm no MRG  Lung- clear bilaterally  Abd- soft, nontender, nondistended  Ext- no edema bilaterally. Skin- warm and dry        Data Review:   Recent Labs     12/10/22  0513 12/11/22  0527 12/12/22  0543      < > 136   K 3.6   < > 4.6   BUN 11   < > 12   WBC 7.8   < > 5.2   HGB 8.6*   < > 8.2*   HCT 27.8*   < > 26.2*      < > 206   HDL 56  --   --     < > = values in this interval not displayed. Assessment/Plan:     Active Hospital Problems    Chest pain- pleuritic- not acs      Dyspnea      Shingles      Hyperthyroidism      Non-small cell lung cancer (HCC)      Iron deficiency anemia due to chronic blood loss      Moderate to severe mitral regurgitation      DNR (do not resuscitate)            POST form completed - DNR but full treatment,            including intubation. No             TRACH. No PEG. Depression      Chronic respiratory failure with hypoxia (HCC)      Chronic anxiety      Essential hypertension, benign Stable. Continue current medical therapy.   Dc ntg paste      Coronary artery disease involving native coronary artery of native heart without angina pectoris      Hyperlipidemia      COPD, very severe ([address]

## 2022-12-13 PROBLEM — Z51.5 ENCOUNTER FOR PALLIATIVE CARE: Status: ACTIVE | Noted: 2022-01-01

## 2022-12-13 PROBLEM — R53.81 DEBILITY: Status: ACTIVE | Noted: 2022-01-01

## 2022-12-13 PROBLEM — R53.83 FATIGUE: Status: ACTIVE | Noted: 2022-01-01

## 2022-12-13 LAB
ANION GAP SERPL CALC-SCNC: 6 MMOL/L (ref 2–11)
BASOPHILS # BLD: 0 K/UL (ref 0–0.2)
BASOPHILS NFR BLD: 0 % (ref 0–2)
BUN SERPL-MCNC: 17 MG/DL (ref 8–23)
CALCIUM SERPL-MCNC: 9.3 MG/DL (ref 8.3–10.4)
CHLORIDE SERPL-SCNC: 101 MMOL/L (ref 101–110)
CO2 SERPL-SCNC: 28 MMOL/L (ref 21–32)
CREAT SERPL-MCNC: 0.6 MG/DL (ref 0.6–1)
DIFFERENTIAL METHOD BLD: ABNORMAL
EOSINOPHIL # BLD: 0 K/UL (ref 0–0.8)
EOSINOPHIL NFR BLD: 0 % (ref 0.5–7.8)
ERYTHROCYTE [DISTWIDTH] IN BLOOD BY AUTOMATED COUNT: 18 % (ref 11.9–14.6)
GLUCOSE SERPL-MCNC: 125 MG/DL (ref 65–100)
HCT VFR BLD AUTO: 26.9 % (ref 35.8–46.3)
HGB BLD-MCNC: 8.4 G/DL (ref 11.7–15.4)
IMM GRANULOCYTES # BLD AUTO: 0.1 K/UL (ref 0–0.5)
IMM GRANULOCYTES NFR BLD AUTO: 1 % (ref 0–5)
LYMPHOCYTES # BLD: 0.2 K/UL (ref 0.5–4.6)
LYMPHOCYTES NFR BLD: 3 % (ref 13–44)
MCH RBC QN AUTO: 31.8 PG (ref 26.1–32.9)
MCHC RBC AUTO-ENTMCNC: 31.2 G/DL (ref 31.4–35)
MCV RBC AUTO: 101.9 FL (ref 82–102)
MONOCYTES # BLD: 0.3 K/UL (ref 0.1–1.3)
MONOCYTES NFR BLD: 4 % (ref 4–12)
NEUTS SEG # BLD: 7.2 K/UL (ref 1.7–8.2)
NEUTS SEG NFR BLD: 92 % (ref 43–78)
NRBC # BLD: 0.02 K/UL (ref 0–0.2)
PLATELET # BLD AUTO: 226 K/UL (ref 150–450)
PMV BLD AUTO: 9.5 FL (ref 9.4–12.3)
POTASSIUM SERPL-SCNC: 4.6 MMOL/L (ref 3.5–5.1)
RBC # BLD AUTO: 2.64 M/UL (ref 4.05–5.2)
SODIUM SERPL-SCNC: 135 MMOL/L (ref 133–143)
WBC # BLD AUTO: 7.7 K/UL (ref 4.3–11.1)

## 2022-12-13 PROCEDURE — 99223 1ST HOSP IP/OBS HIGH 75: CPT | Performed by: NURSE PRACTITIONER

## 2022-12-13 PROCEDURE — 85025 COMPLETE CBC W/AUTO DIFF WBC: CPT

## 2022-12-13 PROCEDURE — 2580000003 HC RX 258: Performed by: NURSE PRACTITIONER

## 2022-12-13 PROCEDURE — 99232 SBSQ HOSP IP/OBS MODERATE 35: CPT | Performed by: INTERNAL MEDICINE

## 2022-12-13 PROCEDURE — 94640 AIRWAY INHALATION TREATMENT: CPT

## 2022-12-13 PROCEDURE — 1100000003 HC PRIVATE W/ TELEMETRY

## 2022-12-13 PROCEDURE — 6370000000 HC RX 637 (ALT 250 FOR IP): Performed by: NURSE PRACTITIONER

## 2022-12-13 PROCEDURE — 94761 N-INVAS EAR/PLS OXIMETRY MLT: CPT

## 2022-12-13 PROCEDURE — 6360000002 HC RX W HCPCS: Performed by: EMERGENCY MEDICINE

## 2022-12-13 PROCEDURE — 2700000000 HC OXYGEN THERAPY PER DAY

## 2022-12-13 PROCEDURE — 2580000003 HC RX 258: Performed by: INTERNAL MEDICINE

## 2022-12-13 PROCEDURE — 6370000000 HC RX 637 (ALT 250 FOR IP): Performed by: INTERNAL MEDICINE

## 2022-12-13 PROCEDURE — 94664 DEMO&/EVAL PT USE INHALER: CPT

## 2022-12-13 PROCEDURE — 6360000002 HC RX W HCPCS: Performed by: INTERNAL MEDICINE

## 2022-12-13 PROCEDURE — 6370000000 HC RX 637 (ALT 250 FOR IP): Performed by: PHYSICIAN ASSISTANT

## 2022-12-13 PROCEDURE — 87205 SMEAR GRAM STAIN: CPT

## 2022-12-13 PROCEDURE — 80048 BASIC METABOLIC PNL TOTAL CA: CPT

## 2022-12-13 RX ORDER — DOXYCYCLINE HYCLATE 100 MG/1
100 CAPSULE ORAL EVERY 12 HOURS SCHEDULED
Status: DISCONTINUED | OUTPATIENT
Start: 2022-12-13 | End: 2022-12-19

## 2022-12-13 RX ORDER — LOSARTAN POTASSIUM 25 MG/1
25 TABLET ORAL DAILY
Status: DISCONTINUED | OUTPATIENT
Start: 2022-12-13 | End: 2022-12-21 | Stop reason: HOSPADM

## 2022-12-13 RX ADMIN — TRAMADOL HYDROCHLORIDE 100 MG: 50 TABLET ORAL at 15:56

## 2022-12-13 RX ADMIN — IPRATROPIUM BROMIDE 0.5 MG: 0.5 SOLUTION RESPIRATORY (INHALATION) at 07:54

## 2022-12-13 RX ADMIN — ACYCLOVIR 800 MG: 800 TABLET ORAL at 08:24

## 2022-12-13 RX ADMIN — TRAMADOL HYDROCHLORIDE 100 MG: 50 TABLET ORAL at 12:48

## 2022-12-13 RX ADMIN — GUAIFENESIN 600 MG: 600 TABLET ORAL at 08:20

## 2022-12-13 RX ADMIN — DOXYCYCLINE HYCLATE 100 MG: 100 CAPSULE ORAL at 21:04

## 2022-12-13 RX ADMIN — GABAPENTIN 300 MG: 300 CAPSULE ORAL at 14:05

## 2022-12-13 RX ADMIN — Medication 4 ML: at 07:54

## 2022-12-13 RX ADMIN — BUDESONIDE 500 MCG: 0.5 INHALANT RESPIRATORY (INHALATION) at 20:30

## 2022-12-13 RX ADMIN — DOXYCYCLINE HYCLATE 100 MG: 100 CAPSULE ORAL at 12:59

## 2022-12-13 RX ADMIN — METHIMAZOLE 5 MG: 5 TABLET ORAL at 08:24

## 2022-12-13 RX ADMIN — ALBUTEROL SULFATE 2.5 MG: 2.5 SOLUTION RESPIRATORY (INHALATION) at 20:28

## 2022-12-13 RX ADMIN — LOSARTAN POTASSIUM 25 MG: 25 TABLET, FILM COATED ORAL at 10:05

## 2022-12-13 RX ADMIN — ATORVASTATIN CALCIUM 80 MG: 80 TABLET, FILM COATED ORAL at 21:03

## 2022-12-13 RX ADMIN — METHYLPREDNISOLONE SODIUM SUCCINATE 60 MG: 125 INJECTION, POWDER, FOR SOLUTION INTRAMUSCULAR; INTRAVENOUS at 21:04

## 2022-12-13 RX ADMIN — ALBUTEROL SULFATE 2.5 MG: 2.5 SOLUTION RESPIRATORY (INHALATION) at 15:08

## 2022-12-13 RX ADMIN — PANTOPRAZOLE SODIUM 40 MG: 40 TABLET, DELAYED RELEASE ORAL at 21:04

## 2022-12-13 RX ADMIN — ASPIRIN 81 MG: 81 TABLET, CHEWABLE ORAL at 08:20

## 2022-12-13 RX ADMIN — METHYLPREDNISOLONE SODIUM SUCCINATE 60 MG: 125 INJECTION, POWDER, FOR SOLUTION INTRAMUSCULAR; INTRAVENOUS at 08:19

## 2022-12-13 RX ADMIN — SODIUM CHLORIDE, PRESERVATIVE FREE 10 ML: 5 INJECTION INTRAVENOUS at 08:22

## 2022-12-13 RX ADMIN — BUDESONIDE 500 MCG: 0.5 INHALANT RESPIRATORY (INHALATION) at 07:54

## 2022-12-13 RX ADMIN — TRAMADOL HYDROCHLORIDE 100 MG: 50 TABLET ORAL at 21:03

## 2022-12-13 RX ADMIN — SODIUM CHLORIDE, PRESERVATIVE FREE 10 ML: 5 INJECTION INTRAVENOUS at 21:04

## 2022-12-13 RX ADMIN — ESCITALOPRAM OXALATE 10 MG: 10 TABLET ORAL at 08:20

## 2022-12-13 RX ADMIN — ACYCLOVIR 800 MG: 800 TABLET ORAL at 21:03

## 2022-12-13 RX ADMIN — GABAPENTIN 300 MG: 300 CAPSULE ORAL at 08:20

## 2022-12-13 RX ADMIN — TRAMADOL HYDROCHLORIDE 100 MG: 50 TABLET ORAL at 08:21

## 2022-12-13 RX ADMIN — GABAPENTIN 300 MG: 300 CAPSULE ORAL at 21:03

## 2022-12-13 RX ADMIN — GUAIFENESIN 600 MG: 600 TABLET ORAL at 21:04

## 2022-12-13 RX ADMIN — EZETIMIBE 10 MG: 10 TABLET ORAL at 21:03

## 2022-12-13 RX ADMIN — CLOPIDOGREL BISULFATE 75 MG: 75 TABLET ORAL at 08:20

## 2022-12-13 RX ADMIN — PANTOPRAZOLE SODIUM 40 MG: 40 TABLET, DELAYED RELEASE ORAL at 08:20

## 2022-12-13 RX ADMIN — ALBUTEROL SULFATE 2.5 MG: 2.5 SOLUTION RESPIRATORY (INHALATION) at 07:54

## 2022-12-13 RX ADMIN — METOPROLOL SUCCINATE 100 MG: 100 TABLET, EXTENDED RELEASE ORAL at 08:20

## 2022-12-13 ASSESSMENT — PAIN SCALES - GENERAL
PAINLEVEL_OUTOF10: 0

## 2022-12-13 NOTE — CONSULTS
Palliative Care    Patient: James Laird MRN: [id number]  SSN: [id number]    YOB: 1950  Age: 67 y.o. Sex: female       Date of Request: 12/13/2022  Date of Consult:  12/13/2022  Reason for Consult:  goals of care and category status  Requesting Physician: JAMES Torres      Assessment/Plan:     Principal Diagnosis:    Dyspnea  R06.00    Additional Diagnoses:   Debility, Unspecified  R53.81  Fatigue, Lethargy  R53.83  Respiratory Failure, Acute on Chronic  J96.20  Counseling, Encounter for Medical Advice  Z71.9  Encounter for Palliative Care  Z51.5    Palliative Performance Scale (PPS):       Medical Decision Making:   Reviewed and summarized notes from admission to present   Discussed case with appropriate providers- Dr Gabo Engel   Reviewed laboratory and x-ray data from admission to present     Pt resting in bed, no distress noted. No visitors at bedside. Pt is familiar with PC, as she sees Surinder Willson NP at [facility]. Spent time talking with pt about her symptoms, and how it has affected her quality of life. She feels like she has had nothing but complications since starting immunotherapy, and is wondering if things will continue to happen. Pt endorses significant dyspnea and debility, to the point that she cannot walk to the bathroom. She states this is unacceptable, and a quality of life she is not willing to endure. She voiced desire to go to STR to get stronger, but voiced worry that she won't improve. Validated her worries and concerns, and provided support. Pt does have a HCPOA and [place] POST on file. Her ex , Christiano Nieves, is her surrogate decision maker, and her daughter Melvina Kumar is first alternate. We did not review her wishes regarding care during today's visit, but will plan to discuss with her tomorrow.   The POST on file states that she does not want resuscitation in the event of cardiopulmonary arrest, but is open to a short term trial of intubation should she need it. Will continue to follow. Will discuss findings with members of the interdisciplinary team.      Thank you for this referral.          .    Subjective:     History obtained from:  Patient, Care Provider, and Chart    Chief Complaint: Dyspnea  History of Present Illness:  Ms Aryan Fofana is a 66 yo female with PMH of of COPD, CAD, NSCLC (stage III), HTN, HLD, and other conditions listed below, who presented to the ER from home one 12/9/2022 with c/o increasing dyspnea and fever for 1 week. Pt reported her dyspnea worsened and was accompanied by chest pain the day of admission. She took NTG with relief of the chest pain. Pt denied cough, n/v/d. Work up revealed mildly elevated HS troponins, and possible progression of her lung CA on Ct scan. Pt was admitted for further management. She was evaluated by pulmonology and oncology. Pt has had ongoing dyspnea since admission, and remains on O2 via NC for desaturations with activity. Her elevated troponins are felt to be related to supply demand mismatch, and not interventions planned. Advance Directive: Yes       Code Status:  Full Code            Health Care Power of : Yes - Copy of [address] on file.     Past Medical History:   Diagnosis Date    Asthma     Closed right hip fracture ([address] 02/2022    Coronary artery disease     Former cigarette smoker     Fracture of right femur ([address] 02/2022    Hyperthyroidism     Lung mass     Multiple closed anterior-posterior compression fractures of pelvis ([address] 02/2022    Non-small cell lung cancer ([address]     Severe chronic obstructive pulmonary disease ([address]     Subdural hematoma 03/2022    Thyroid nodule       Past Surgical History:   Procedure Laterality Date    BRONCHOSCOPY N/A 07/20/2022    BRONCHOSCOPY ENDOBRONCHIAL ULTRASOUND performed by Tr Gupta MD at [facility]    CAROTID ENDARTERECTOMY Right     CATARACT REMOVAL Bilateral     CORONARY STENT PLACEMENT 2022    HIP FRACTURE SURGERY Right 2022    IR PORT PLACEMENT EQUAL OR GREATER THAN 5 YEARS  2022    IR PORT PLACEMENT EQUAL OR GREATER THAN 5 YEARS 2022 [facility]    WISDOM TOOTH EXTRACTION      as a teenager     Family History   Adopted: Yes   Problem Relation Age of Onset    No Known Problems Mother     No Known Problems Father     Thyroid Disease Daughter       Social History     Tobacco Use    Smoking status: Former     Packs/day: 0.50     Years: 50.00     Pack years: 25.00     Types: Cigarettes     Quit date:      Years since quittin.9    Smokeless tobacco: Never   Substance Use Topics    Alcohol use: Not Currently     Prior to Admission medications    Medication Sig Start Date End Date Taking? Authorizing Provider   acyclovir (ZOVIRAX) 800 MG tablet Take 1 tablet by mouth 2 times daily 22  Tara Blas MD   ondansetron [facility]) 8 MG tablet Take 1 tablet by mouth every 8 hours as needed for Nausea or Vomiting 22  YISEL Back CNP   gabapentin (NEURONTIN) 100 MG capsule Take 3 capsules by mouth 3 times daily for 30 days. 22  YISEL Back CNP   ketorolac (TORADOL) 10 MG tablet Take 1 tablet by mouth every 6 hours as needed for Pain  Patient not taking: Reported on 22   Jewel Alba MD   methIMAzole (TAPAZOLE) 10 MG tablet Take 0.5 tablets by mouth daily 10/31/22   Amalia Thompson MD   azithromycin (ZITHROMAX) 500 MG tablet Take by mouth    Historical Provider, MD   Magic Mouthwash (MIRACLE MOUTHWASH) Swish and swallow 5 mLs 4 times daily as needed for Irritation Equal parts viscous lidocaine, maalox, nystatin, benadryl  Patient not taking: Reported on 2022 10/10/22   Corrinne Peri, MD   traMADol (ULTRAM) 50 MG tablet Take 2 tablets by mouth 4 times daily for 90 days.  Z51.5 Palliative Care 10/4/22 1/2/23  YISEL Back CNP   predniSONE (DELTASONE) 5 MG tablet  8/1/22   Historical Provider, MD   albuterol (PROVENTIL) (2.5 MG/3ML) 0.083% nebulizer solution TAKE 3 ML BY NEBULIZATION EVERY 6 HOURS AS NEEDED FOR SHORTNESS OF BREATH 8/26/22   Historical Provider, MD   tiotropium (SPIRIVA RESPIMAT) 2.5 MCG/ACT AERS inhaler Inhale 2 puffs into the lungs in the morning. 8/16/22   Raheem Ridley MD   budesonide-formoterol (SYMBICORT) 160-4.5 MCG/ACT AERO Inhale 2 puffs into the lungs in the morning and 2 puffs before bedtime. 8/16/22   Raheem Ridley MD   OXYGEN Inhale into the lungs 2 LPM    Historical Provider, MD   lidocaine-prilocaine (EMLA) 2.5-2.5 % cream Apply topically as needed.  8/2/22   Solitario Howard MD   ondansetron (ZOFRAN ODT) 4 MG disintegrating tablet Take 1 tablet by mouth every 8 hours as needed for Nausea or Vomiting 7/26/22   Solitario oHward MD   prochlorperazine (COMPAZINE) 10 MG tablet Take 1 tablet by mouth every 6 hours as needed (chemo-induced nausea) 7/26/22   Solitario Howard MD   predniSONE (DELTASONE) 10 MG tablet Take 10 mg by mouth daily (with breakfast)    Historical Provider, MD   Pseudoephedrine-guaiFENesin ([address] D PO) Take by mouth in the morning and at bedtime    Historical Provider, MD   torsemide (DEMADEX) 10 MG tablet Take 10 mg by mouth daily    Historical Provider, MD   acetaminophen (TYLENOL) 325 MG tablet Take by mouth every 4 hours as needed    Ar Automatic Reconciliation   aspirin 81 MG chewable tablet Take 81 mg by mouth daily 3/31/22   Ar Automatic Reconciliation   atorvastatin (LIPITOR) 80 MG tablet Take 80 mg by mouth at bedtime TAKE 1 TABLET BY MOUTH DAILY 9/27/21   Ar Automatic Reconciliation   cetirizine (ZYRTEC) 10 MG tablet Take 10 mg by mouth daily as needed 3/30/22   Ar Automatic Reconciliation   clopidogrel (PLAVIX) 75 MG tablet Take 75 mg by mouth daily 3/31/22   Ar Automatic Reconciliation   escitalopram (LEXAPRO) 10 MG tablet Take 10 mg by mouth daily 4/14/22   Ar Automatic Reconciliation   ezetimibe (ZETIA) 10 MG tablet Take 10 mg by mouth at bedtime 10/7/21   Ar Automatic Reconciliation   metoprolol succinate (TOPROL XL) 100 MG extended release tablet Take 100 mg by mouth daily 3/30/22   Ar Automatic Reconciliation   nitroGLYCERIN (NITROSTAT) 0.4 MG SL tablet Place 0.4 mg under the tongue 3 times daily as needed 2/4/22   Ar Automatic Reconciliation   pantoprazole (PROTONIX) 40 MG tablet Take 40 mg by mouth 2 times daily 3/30/22   Ar Automatic Reconciliation   Roflumilast (DALIRESP) 500 MCG tablet Take 500 mcg by mouth nightly 3/30/22   Ar Automatic Reconciliation       Allergies   Allergen Reactions    Promethazine Nausea And Vomiting and Other (See Comments)     Severe vomiting         Review of Systems:  A comprehensive review of systems was negative except for:   Constitutional: Positive for fatigue, generalized weakness  Respiratory: Positive for dyspnea      Objective:     Visit Vitals  /65   Pulse 87   Temp 97.7 °F (36.5 °C)   Resp 18   Wt 130 lb 1.1 oz (59 kg)   SpO2 96%   BMI 21.64 kg/m²        Physical Exam:    General:  Cooperative. Debilitated. No acute distress. Eyes:  Conjunctivae/corneas clear    Nose: Nares normal. Septum midline.  O2 via NC    Neck: Supple, symmetrical, trachea midline   Lungs:   unlabored   Heart:  Regular rate and rhythm   Abdomen:   Soft, non-tender, non-distended   Extremities: Normal, atraumatic, no cyanosis or edema   Skin: Skin color, texture, turgor normal.    Neurologic: Nonfocal   Psych: Alert and oriented      Assessment:     [unfilled]    Signed By: YISEL Berger - LUCAS     December 13, 2022

## 2022-12-13 NOTE — PROGRESS NOTES
Pt continues to struggle with severe dyspnea. Is currently on 5L; [facility] provides home oxygen. Baseline is 3L. PT/OT consulted and recommended HH. CM spoke with pt about receiving [place] following d/c and politely said that she has not worked with therapy during this hospital admission. CM informed her that PT worked with her yesterday but still reported that this writer was mistaken and politely declined [place] services. Will remain available.

## 2022-12-13 NOTE — PROGRESS NOTES
[address]  Admission Date: 12/9/2022         Daily Progress Note: 12/13/2022    The patient's chart is reviewed and the patient is discussed with the staff. Background: 78yo WF with h/o CAD s/p 15 stents, HTN, HLD, nsclc stage III (completed chemo/radiation, on immunotherapy), very severe COPD (evaluated and turned down for transplant x 2) with chronic hypoxic respiratory failure on 2L O2 continuously, and anxiety/depression, presented to ER with chest pain and shortness of breath on 12/9. Took nitro at home with relief of chest pain. She was found to have elevated BNP and troponin in the ER. She was admitted by cardiology and started on heparin gtt. We were consulted secondary to wheezing. Subjective:   Pt sitting up in bed on 5L O2. Per RN, pt was on 3L this AM but got up to bedside commode and had significant desaturations and had to be turned up to 5L O2. Pt was able to cough up some yellow phlegm this AM. She reports wheezing in her throat.      Current Facility-Administered Medications   Medication Dose Route Frequency    losartan (COZAAR) tablet 25 mg  25 mg Oral Daily    medicated lip ointment (BLISTEX)   Topical PRN    guaiFENesin-dextromethorphan (ROBITUSSIN DM) 100-10 MG/5ML syrup 5 mL  5 mL Oral Q4H PRN    methylPREDNISolone sodium (SOLU-MEDROL) injection 60 mg  60 mg IntraVENous Q12H    budesonide (PULMICORT) nebulizer suspension 500 mcg  0.5 mg Nebulization BID    sodium chloride (Inhalant) 3 % nebulizer solution 4 mL  4 mL Nebulization BID    albuterol (PROVENTIL) nebulizer solution 2.5 mg  2.5 mg Nebulization Q6H WA    HYDROcodone homatropine (HYCODAN) 5-1.5 MG/5ML solution 5 mL  5 mL Oral Q4H PRN    levalbuterol (XOPENEX) nebulizer solution 1.25 mg  1.25 mg Nebulization Q4H PRN    acetaminophen (TYLENOL) tablet 650 mg  650 mg Oral Q4H PRN    acyclovir (ZOVIRAX) tablet 800 mg  800 mg Oral BID    albuterol (PROVENTIL) nebulizer solution 2.5 mg  2.5 mg Nebulization Q4H PRN    aspirin chewable tablet 81 mg  81 mg Oral Daily    atorvastatin (LIPITOR) tablet 80 mg  80 mg Oral Nightly    cetirizine (ZYRTEC) tablet 10 mg  10 mg Oral Daily PRN    clopidogrel (PLAVIX) tablet 75 mg  75 mg Oral Daily    escitalopram (LEXAPRO) tablet 10 mg  10 mg Oral Daily    ezetimibe (ZETIA) tablet 10 mg  10 mg Oral Nightly    gabapentin (NEURONTIN) capsule 300 mg  300 mg Oral TID    HYDROcodone-acetaminophen (NORCO) 5-325 MG per tablet 2 tablet  2 tablet Oral Q6H PRN    magic (miracle) mouthwash with nystatin  5 mL Swish & Swallow 4x Daily PRN    methIMAzole (TAPAZOLE) tablet 5 mg  5 mg Oral Daily    metoprolol succinate (TOPROL XL) extended release tablet 100 mg  100 mg Oral Daily    ondansetron (ZOFRAN-ODT) disintegrating tablet 4 mg  4 mg Oral Q8H PRN    pantoprazole (PROTONIX) tablet 40 mg  40 mg Oral BID    Roflumilast (DALIRESP) tablet 500 mcg (Patient Supplied)  500 mcg Oral Nightly    tiotropium (SPIRIVA RESPIMAT) 2.5 MCG/ACT inhaler 2 puff  2 puff Inhalation Daily    [Held by provider] torsemide (DEMADEX) tablet 10 mg  10 mg Oral Daily    traMADol (ULTRAM) tablet 100 mg  100 mg Oral 4x daily    sodium chloride flush 0.9 % injection 5-40 mL  5-40 mL IntraVENous 2 times per day    sodium chloride flush 0.9 % injection 5-40 mL  5-40 mL IntraVENous PRN    0.9 % sodium chloride infusion   IntraVENous PRN    polyethylene glycol (GLYCOLAX) packet 17 g  17 g Oral Daily PRN    potassium chloride (KLOR-CON M) extended release tablet 40 mEq  40 mEq Oral PRN    Or    potassium bicarb-citric acid (EFFER-K) effervescent tablet 40 mEq  40 mEq Oral PRN    Or    potassium chloride 10 mEq/100 mL IVPB (Peripheral Line)  10 mEq IntraVENous PRN    magnesium sulfate 2000 mg in 50 mL IVPB premix  2,000 mg IntraVENous PRN    ondansetron (ZOFRAN) injection 4 mg  4 mg IntraVENous Q6H PRN    aluminum & magnesium hydroxide-simethicone (MAALOX) 200-200-20 MG/5ML suspension 30 mL  30 mL Oral Q6H PRN    nitroGLYCERIN (NITROSTAT) SL tablet 0.4 mg  0.4 mg SubLINGual Q5 Min PRN    0.9 % sodium chloride infusion   IntraVENous Continuous    azithromycin (ZITHROMAX) tablet 500 mg  500 mg Oral Once per day on Mon Wed Fri    guaiFENesin [facility]) extended release tablet 600 mg  600 mg Oral BID    heparin (porcine) injection 3,540 Units  60 Units/kg IntraVENous PRN    heparin (porcine) injection 1,770 Units  30 Units/kg IntraVENous PRN    heparin 25,000 units in dextrose 5% 250 mL (premix) infusion  5-30 Units/kg/hr IntraVENous Continuous    LORazepam (ATIVAN) injection 1 mg  1 mg IntraVENous Q6H PRN     Review of Systems: Comprehensive ROS negative except in HPI  Objective:   Blood pressure 138/65, pulse 87, temperature 97.7 °F (36.5 °C), resp. rate 18, weight 130 lb 1.1 oz (59 kg), SpO2 96 %. Intake/Output Summary (Last 24 hours) at 12/13/2022 1019  Last data filed at 12/12/2022 1714  Gross per 24 hour   Intake 720 ml   Output 450 ml   Net 270 ml       Physical Exam:   Constitutional:  the patient is well developed and in no acute distress, on 5L O2-sat 100%  EENMT:  Sclera clear, pupils equal, oral mucosa moist  Respiratory: symmetric chest rise. More clear today. Cardiovascular:  RRR without M,G,R. There is no lower extremity edema. Gastrointestinal: soft and non-tender; with positive bowel sounds. Musculoskeletal: warm without cyanosis. Normal muscle tone. Skin:  no jaundice or rashes, no wounds   Neurologic: symmetric strength, fluent speech  Psychiatric:  calm, appropriate, oriented x 4    Imaging: I performed an independent interpretation of the patient's images.   CXR: 12/10/22      CT Chest 12/9/22      LAB:  Recent Labs     12/11/22  0527 12/12/22  0246 12/12/22  0543 12/13/22  0510   WBC 5.3  --  5.2 7.7   HGB 7.6* 8.2* 8.2* 8.4*   HCT 24.4* 26.2* 26.2* 26.9*     --  206 226       Recent Labs     12/11/22  0527 12/12/22  0543 12/13/22  0510    136 135   K 3.8 4.6 4.6    101 101   CO2 30 31 28   BUN 10 12 17   CREATININE 0.60 0.60 0.60   MG 2.0 2.2  --        No results for input(s): TROPHS, NTPROBNP, CRP, ESR in the last 72 hours. Recent Labs     12/11/22  0527 12/12/22  0543 12/13/22  0510   GLUCOSE 116* 138* 125*        Microbiology:   No results for input(s): CULTURE in the last 72 hours. ECHO: 12/09/22    TRANSTHORACIC ECHOCARDIOGRAM (TTE) COMPLETE (CONTRAST/BUBBLE/3D PRN) 12/10/2022  7:40 PM (Final)    Interpretation Summary    Left Ventricle: Low normal left ventricular systolic function. EF by 2D Simpsons Biplane is 52%. Left ventricle size is normal. Normal wall thickness. Normal wall motion. Normal diastolic function. Right Ventricle: Right ventricle size is normal. Normal systolic function. Aortic Valve: Tricuspid valve. Mildly thickened cusps. Moderate regurgitation. AV PHT is 384.0 ms. No stenosis. Mitral Valve: Mildly thickened leaflet, at the anterior and posterior leaflets. Moderate to severe regurgitation. Tricuspid Valve: Mild to moderate regurgitation. The estimated RVSP is 42 mmHg. Right Atrium: Right atrium size is normal.  Echodense structure noted in the right atrium may represent indwelling central venous catheter. IVC/SVC: IVC diameter is less than or equal to 21 mm and decreases greater than 50% during inspiration; therefore the estimated right atrial pressure is normal (~3 mmHg). Technical qualifiers: Technically difficult study, color flow Doppler was performed and pulse wave and/or continuous wave Doppler was performed. Signed by: Monie Romero DO on 12/10/2022  7:40 PM    Assessment and Plan:  (Medical Decision Making)   Principal Problem:    Chest pain  Plan: heparin drip now off. Getting plavix, asa, nitropaste. Cardiology feels likely demand ischemia. Active Problems:    Dyspnea  Plan: treating for COPD exacerbation. Shingles  Plan: on acyclovir.        Non-small cell lung cancer [facility])  Plan: Oncology has seen with CT concerning for progression of cancer on the left. Currently on durvalumab immunotherapy monthly. Onc recommends f/u OP and will consider PET scan at that time to help determine progression of disease vs post radiation changes. Acute on Chronic respiratory failure with hypoxia (HCC)  Plan: due to COPD exacerbation, turned O2 down to 4L and discussed with RN to decrease as able. Pt reports that she was actually using 3L at home prior to admission. .       COPD, very severe ([address]  Plan: getting solumedrol q12, albuterol, pulmicort, azithro, atrovent, on azithrom three times per week. DNR (do not resuscitate)  Plan: appropriate. Palliative care recommended by cardiology and I agree. Will consult. More than 50% of the time documented was spent in face-to-face contact with the patient and in the care of the patient on the floor/unit where the patient is located. In this split/shared evaluation I performed performed a medically appropriate history and exam, counseled and educated the patient and/or family member, ordered medications, tests or procedures, documented information in EMR, and coordinated care. which accounted for 14 minutes of clinical time. JAMES Sen      In this split/shared evaluation I performed reviewed the patients's H&P, available images, labs, cultures. , discussed case in detail with NPP, performed a medically appropriate history and exam, counseled and educated the patient and/or family member, ordered and/or reviewed medications, tests or procedures, documented information in EMR, independently interpreted images, and coordinated care. which accounted for 16 minutes clinical time. Impression:     Pt on 3L at rest, 5L with exertion. Still more short of breath that baseline. More productive cough today. Will order sputum cultures and change tiw azithro to doxy. Wanting to work with PT. Ordered. Sees palliative as oupt. Agree would benfit from inpt conversation as well. Will need to discuss code states. Discussed with her there may be limitations to her further improvement. Cont albuterol, pulmicort, solumedrol, spiriva.       Hank Beckwith MD

## 2022-12-13 NOTE — PROGRESS NOTES
[facility] CARDIOLOGY PROGRESS NOTE           12/13/2022 9:13 AM    Admit Date: 12/9/2022      Subjective:   Patient continues to struggle with severe dyspnea. She states she can still not make it to the bathroom without stopping due to profound shortness of breath. ROS:  Cardiovascular:  As noted above    Objective:      Vitals:    12/13/22 0023 12/13/22 0524 12/13/22 0759 12/13/22 0838   BP: 119/69 134/72  138/65   Pulse: 74 90 84 87   Resp: 18 18 19 18   Temp: 97.9 °F (36.6 °C) 98.2 °F (36.8 °C)  97.7 °F (36.5 °C)   TempSrc:       SpO2: 98% 97% 96%    Weight:  130 lb 1.1 oz (59 kg)         Physical Exam:  General-No Acute Distress  Neck- supple, no JVD  CV-2/6 at apex  Lung-diminished bilaterally  Abd- soft, nontender, nondistended  Ext- no edema bilaterally. Skin- warm and dry    Data Review:   Recent Labs     12/11/22  0527 12/12/22  0246 12/12/22  0543 12/13/22  0510     --  136 135   K 3.8  --  4.6 4.6   MG 2.0  --  2.2  --    BUN 10  --  12 17   WBC 5.3  --  5.2 7.7   HGB 7.6*   < > 8.2* 8.4*   HCT 24.4*   < > 26.2* 26.9*     --  206 226    < > = values in this interval not displayed. Result status: Final result       Left Ventricle: Low normal left ventricular systolic function. EF by 2D Simpsons Biplane is 52%. Left ventricle size is normal. Normal wall thickness. Normal wall motion. Normal diastolic function. Right Ventricle: Right ventricle size is normal. Normal systolic function. Aortic Valve: Tricuspid valve. Mildly thickened cusps. Moderate regurgitation. AV PHT is 384.0 ms. No stenosis. Mitral Valve: Mildly thickened leaflet, at the anterior and posterior leaflets. Moderate to severe regurgitation. Tricuspid Valve: Mild to moderate regurgitation. The estimated RVSP is 42 mmHg. Right Atrium: Right atrium size is normal.  Echodense structure noted in the right atrium may represent indwelling central venous catheter.     IVC/SVC: IVC diameter is less than or equal to 21 mm and decreases greater than 50% during inspiration; therefore the estimated right atrial pressure is normal (~3 mmHg). Technical qualifiers: Technically difficult study, color flow Doppler was performed and pulse wave and/or continuous wave Doppler was performed. Assessment/Plan:     Principal Problem:    Acute on chronic respiratory failure with hypoxia (HCC)  Plan: Patient with acute on chronic severe near end-stage COPD exacerbation. [facility] [address] [place] [address]. Active Problems:    Nonrheumatic mitral valve regurgitation  Plan: Patient with moderate to severe mitral regurgitation. Given patient's severe end-stage COPD she is not a candidate for any invasive therapies. Volume status is stable. Chest pain  Plan: Patient denies any current chest pain. No recent PCI was 02/2022. Remains on dual antiplatelet therapy. She has extensive prior stenting and diffuse pattern CAD that is best managed medically. Dyspnea  Plan: Please see above    Non-small cell lung cancer ([facility] [address]  Plan: CT suggest progression. Oncology plans outpatient PET scan. Hyperthyroidism  Plan: Chronic and stable    COPD exacerbation (HCC)  Plan: Managed per primary team    Essential hypertension, benign  Plan: Labile on metoprolol 100 mg daily. We will add low-dose losartan 25 mg daily. Iron deficiency anemia due to chronic blood loss  Plan: This is severe and worsening. This is likely a significant contributor to patient's overall multifactorial dyspnea. Chronic anxiety  Plan: This is severe contributes to patient's multifactorial dyspnea. Coronary artery disease involving native coronary artery of native heart without angina pectoris  Plan: Patient with well-known diffuse pattern severe CAD. She had PCI and stenting 02/2022 to LAD and diagonal.  She remains on dual antiplatelet therapy.     DNR (do not resuscitate)  Plan: Patient likely would benefit with referral to palliative care. Given patient's severe O2 dependent COPD, severe diffuse pattern CAD and moderate to severe MR with severe anemia patient is not a candidate for any invasive procedures. Hyperlipidemia  Plan: Continue atorvastatin 80 mg daily    Depression  Plan: Severe contributing to patient's overall feeling poorly.         Micheal Carrera MD  12/13/2022 9:13 AM

## 2022-12-14 LAB
ANION GAP SERPL CALC-SCNC: 4 MMOL/L (ref 2–11)
BACTERIA SPEC CULT: NORMAL
BACTERIA SPEC CULT: NORMAL
BASOPHILS # BLD: 0 K/UL (ref 0–0.2)
BASOPHILS NFR BLD: 0 % (ref 0–2)
BUN SERPL-MCNC: 16 MG/DL (ref 8–23)
CALCIUM SERPL-MCNC: 9.1 MG/DL (ref 8.3–10.4)
CHLORIDE SERPL-SCNC: 100 MMOL/L (ref 101–110)
CO2 SERPL-SCNC: 32 MMOL/L (ref 21–32)
CREAT SERPL-MCNC: 0.7 MG/DL (ref 0.6–1)
DIFFERENTIAL METHOD BLD: ABNORMAL
EOSINOPHIL # BLD: 0 K/UL (ref 0–0.8)
EOSINOPHIL NFR BLD: 0 % (ref 0.5–7.8)
ERYTHROCYTE [DISTWIDTH] IN BLOOD BY AUTOMATED COUNT: 17.8 % (ref 11.9–14.6)
GLUCOSE SERPL-MCNC: 140 MG/DL (ref 65–100)
HCT VFR BLD AUTO: 28 % (ref 35.8–46.3)
HGB BLD-MCNC: 8.7 G/DL (ref 11.7–15.4)
IMM GRANULOCYTES # BLD AUTO: 0.1 K/UL (ref 0–0.5)
IMM GRANULOCYTES NFR BLD AUTO: 1 % (ref 0–5)
LYMPHOCYTES # BLD: 0.3 K/UL (ref 0.5–4.6)
LYMPHOCYTES NFR BLD: 4 % (ref 13–44)
MCH RBC QN AUTO: 31.3 PG (ref 26.1–32.9)
MCHC RBC AUTO-ENTMCNC: 31.1 G/DL (ref 31.4–35)
MCV RBC AUTO: 100.7 FL (ref 82–102)
MONOCYTES # BLD: 0.4 K/UL (ref 0.1–1.3)
MONOCYTES NFR BLD: 4 % (ref 4–12)
NEUTS SEG # BLD: 8.5 K/UL (ref 1.7–8.2)
NEUTS SEG NFR BLD: 91 % (ref 43–78)
NRBC # BLD: 0.02 K/UL (ref 0–0.2)
PLATELET # BLD AUTO: 245 K/UL (ref 150–450)
PMV BLD AUTO: 9.6 FL (ref 9.4–12.3)
POTASSIUM SERPL-SCNC: 4.3 MMOL/L (ref 3.5–5.1)
RBC # BLD AUTO: 2.78 M/UL (ref 4.05–5.2)
SERVICE CMNT-IMP: NORMAL
SERVICE CMNT-IMP: NORMAL
SODIUM SERPL-SCNC: 136 MMOL/L (ref 133–143)
WBC # BLD AUTO: 9.4 K/UL (ref 4.3–11.1)

## 2022-12-14 PROCEDURE — 99232 SBSQ HOSP IP/OBS MODERATE 35: CPT | Performed by: INTERNAL MEDICINE

## 2022-12-14 PROCEDURE — 6370000000 HC RX 637 (ALT 250 FOR IP): Performed by: INTERNAL MEDICINE

## 2022-12-14 PROCEDURE — 85025 COMPLETE CBC W/AUTO DIFF WBC: CPT

## 2022-12-14 PROCEDURE — 6360000002 HC RX W HCPCS: Performed by: INTERNAL MEDICINE

## 2022-12-14 PROCEDURE — 2700000000 HC OXYGEN THERAPY PER DAY

## 2022-12-14 PROCEDURE — 6370000000 HC RX 637 (ALT 250 FOR IP): Performed by: NURSE PRACTITIONER

## 2022-12-14 PROCEDURE — 6370000000 HC RX 637 (ALT 250 FOR IP): Performed by: PHYSICIAN ASSISTANT

## 2022-12-14 PROCEDURE — 94760 N-INVAS EAR/PLS OXIMETRY 1: CPT

## 2022-12-14 PROCEDURE — 1100000003 HC PRIVATE W/ TELEMETRY

## 2022-12-14 PROCEDURE — 94640 AIRWAY INHALATION TREATMENT: CPT

## 2022-12-14 PROCEDURE — 2580000003 HC RX 258: Performed by: NURSE PRACTITIONER

## 2022-12-14 PROCEDURE — 80048 BASIC METABOLIC PNL TOTAL CA: CPT

## 2022-12-14 PROCEDURE — 2580000003 HC RX 258: Performed by: INTERNAL MEDICINE

## 2022-12-14 PROCEDURE — 97530 THERAPEUTIC ACTIVITIES: CPT

## 2022-12-14 RX ADMIN — ATORVASTATIN CALCIUM 80 MG: 80 TABLET, FILM COATED ORAL at 20:29

## 2022-12-14 RX ADMIN — BUDESONIDE 500 MCG: 0.5 INHALANT RESPIRATORY (INHALATION) at 19:09

## 2022-12-14 RX ADMIN — METHYLPREDNISOLONE SODIUM SUCCINATE 60 MG: 125 INJECTION, POWDER, FOR SOLUTION INTRAMUSCULAR; INTRAVENOUS at 20:27

## 2022-12-14 RX ADMIN — TRAMADOL HYDROCHLORIDE 100 MG: 50 TABLET ORAL at 08:47

## 2022-12-14 RX ADMIN — ACYCLOVIR 800 MG: 800 TABLET ORAL at 08:47

## 2022-12-14 RX ADMIN — ALBUTEROL SULFATE 2.5 MG: 2.5 SOLUTION RESPIRATORY (INHALATION) at 19:08

## 2022-12-14 RX ADMIN — SODIUM CHLORIDE, PRESERVATIVE FREE 10 ML: 5 INJECTION INTRAVENOUS at 08:49

## 2022-12-14 RX ADMIN — DOXYCYCLINE HYCLATE 100 MG: 100 CAPSULE ORAL at 08:47

## 2022-12-14 RX ADMIN — LOSARTAN POTASSIUM 25 MG: 25 TABLET, FILM COATED ORAL at 08:48

## 2022-12-14 RX ADMIN — METOPROLOL SUCCINATE 100 MG: 100 TABLET, EXTENDED RELEASE ORAL at 08:48

## 2022-12-14 RX ADMIN — TRAMADOL HYDROCHLORIDE 100 MG: 50 TABLET ORAL at 11:45

## 2022-12-14 RX ADMIN — PANTOPRAZOLE SODIUM 40 MG: 40 TABLET, DELAYED RELEASE ORAL at 08:48

## 2022-12-14 RX ADMIN — GUAIFENESIN 600 MG: 600 TABLET ORAL at 08:46

## 2022-12-14 RX ADMIN — GUAIFENESIN 600 MG: 600 TABLET ORAL at 20:27

## 2022-12-14 RX ADMIN — TIOTROPIUM BROMIDE INHALATION SPRAY 2 PUFF: 3.12 SPRAY, METERED RESPIRATORY (INHALATION) at 08:20

## 2022-12-14 RX ADMIN — DOXYCYCLINE HYCLATE 100 MG: 100 CAPSULE ORAL at 20:27

## 2022-12-14 RX ADMIN — GABAPENTIN 300 MG: 300 CAPSULE ORAL at 20:29

## 2022-12-14 RX ADMIN — METHIMAZOLE 5 MG: 5 TABLET ORAL at 08:46

## 2022-12-14 RX ADMIN — METHYLPREDNISOLONE SODIUM SUCCINATE 60 MG: 125 INJECTION, POWDER, FOR SOLUTION INTRAMUSCULAR; INTRAVENOUS at 08:49

## 2022-12-14 RX ADMIN — TRAMADOL HYDROCHLORIDE 100 MG: 50 TABLET ORAL at 20:27

## 2022-12-14 RX ADMIN — ASPIRIN 81 MG: 81 TABLET, CHEWABLE ORAL at 08:46

## 2022-12-14 RX ADMIN — ALBUTEROL SULFATE 2.5 MG: 2.5 SOLUTION RESPIRATORY (INHALATION) at 15:01

## 2022-12-14 RX ADMIN — PANTOPRAZOLE SODIUM 40 MG: 40 TABLET, DELAYED RELEASE ORAL at 20:27

## 2022-12-14 RX ADMIN — ACYCLOVIR 800 MG: 800 TABLET ORAL at 20:28

## 2022-12-14 RX ADMIN — TRAMADOL HYDROCHLORIDE 100 MG: 50 TABLET ORAL at 16:10

## 2022-12-14 RX ADMIN — ALBUTEROL SULFATE 2.5 MG: 2.5 SOLUTION RESPIRATORY (INHALATION) at 08:14

## 2022-12-14 RX ADMIN — GABAPENTIN 300 MG: 300 CAPSULE ORAL at 08:46

## 2022-12-14 RX ADMIN — ESCITALOPRAM OXALATE 10 MG: 10 TABLET ORAL at 08:47

## 2022-12-14 RX ADMIN — Medication 4 ML: at 19:10

## 2022-12-14 RX ADMIN — CLOPIDOGREL BISULFATE 75 MG: 75 TABLET ORAL at 08:47

## 2022-12-14 RX ADMIN — Medication 4 ML: at 08:14

## 2022-12-14 RX ADMIN — EZETIMIBE 10 MG: 10 TABLET ORAL at 20:28

## 2022-12-14 RX ADMIN — BUDESONIDE 500 MCG: 0.5 INHALANT RESPIRATORY (INHALATION) at 08:14

## 2022-12-14 RX ADMIN — SODIUM CHLORIDE, PRESERVATIVE FREE 10 ML: 5 INJECTION INTRAVENOUS at 20:28

## 2022-12-14 RX ADMIN — GABAPENTIN 300 MG: 300 CAPSULE ORAL at 14:24

## 2022-12-14 ASSESSMENT — PAIN DESCRIPTION - ORIENTATION
ORIENTATION: MID;RIGHT;LEFT;ANTERIOR;POSTERIOR
ORIENTATION: ANTERIOR;POSTERIOR;MID;UPPER
ORIENTATION: MID;UPPER;POSTERIOR

## 2022-12-14 ASSESSMENT — PAIN - FUNCTIONAL ASSESSMENT
PAIN_FUNCTIONAL_ASSESSMENT: PREVENTS OR INTERFERES SOME ACTIVE ACTIVITIES AND ADLS
PAIN_FUNCTIONAL_ASSESSMENT: ACTIVITIES ARE NOT PREVENTED
PAIN_FUNCTIONAL_ASSESSMENT: ACTIVITIES ARE NOT PREVENTED

## 2022-12-14 ASSESSMENT — PAIN DESCRIPTION - DESCRIPTORS
DESCRIPTORS: SHARP

## 2022-12-14 ASSESSMENT — PAIN SCALES - GENERAL
PAINLEVEL_OUTOF10: 0
PAINLEVEL_OUTOF10: 6
PAINLEVEL_OUTOF10: 0
PAINLEVEL_OUTOF10: 8
PAINLEVEL_OUTOF10: 8

## 2022-12-14 ASSESSMENT — PAIN DESCRIPTION - LOCATION
LOCATION: BACK
LOCATION: RIB CAGE;BACK
LOCATION: BACK;RIB CAGE

## 2022-12-14 NOTE — PROGRESS NOTES
Pt on 1L supplemental now. Palliative Care was consulted. Pt reported to her that she wanted to go to STR to get stronger, but was concerned that she would not improve. Therapy last worked with the pt on 12/12/22 and recommended HH. Unfortunately, insurance would not cover a STR stay if [place] is being recommended. CM spoke to the pt on 12/10/22 about their recommendation and said that therapy never worked with her and did not want [place] at that time. Will revisit.

## 2022-12-14 NOTE — PLAN OF CARE
Problem: Discharge Planning  Goal: Discharge to home or other facility with appropriate resources  Outcome: Progressing  Flowsheets (Taken 12/14/2022 0851)  Discharge to home or other facility with appropriate resources:   Identify barriers to discharge with patient and caregiver   Arrange for needed discharge resources and transportation as appropriate   Identify discharge learning needs (meds, wound care, etc)   Refer to discharge planning if patient needs post-hospital services based on physician order or complex needs related to functional status, cognitive ability or social support system     Problem: Safety - Adult  Goal: Free from fall injury  Outcome: Progressing  Flowsheets (Taken 12/14/2022 0851)  Free From Fall Injury: Instruct family/caregiver on patient safety     Problem: Pain  Goal: Verbalizes/displays adequate comfort level or baseline comfort level  Outcome: Progressing     Problem: Skin/Tissue Integrity  Goal: Absence of new skin breakdown  Description: 1. Monitor for areas of redness and/or skin breakdown  2. Assess vascular access sites hourly  3. Every 4-6 hours minimum:  Change oxygen saturation probe site  4. Every 4-6 hours:  If on nasal continuous positive airway pressure, respiratory therapy assess nares and determine need for appliance change or resting period.   Outcome: Progressing     Problem: ABCDS Injury Assessment  Goal: Absence of physical injury  Outcome: Progressing  Flowsheets (Taken 12/14/2022 0851)  Absence of Physical Injury: Implement safety measures based on patient assessment

## 2022-12-14 NOTE — PROGRESS NOTES
ACUTE PHYSICAL THERAPY GOALS:   (Developed with and agreed upon by patient and/or caregiver.)  (1.) Jeremiah Martinez  will move from supine to sit and sit to supine  with INDEPENDENT within 7 treatment day(s). (2.) Jeremiah Martinez will transfer from bed to chair and chair to bed with MODIFIED INDEPENDENCE using the least restrictive device within 7 treatment day(s). (3.) Jeremiah Martinez will ambulate with MODIFIED INDEPENDENCE for 150 feet with the least restrictive device within 7 treatment day(s). (4.) Jeremiah Martinez will perform standing static and dynamic balance activities x 15 minutes with MODIFIED INDEPENDENCE to improve safety within 7 treatment day(s). PHYSICAL THERAPY: Daily Note PM   (Link to Caseload Tracking: PT Visit Days : 2  Time In/Out PT Charge Capture  Rehab Caseload Tracker  Orders    Jeremiah Martinez is a 67 y.o. female   PRIMARY DIAGNOSIS: Acute on chronic respiratory failure with hypoxia ([address]  Chest pain [R07.9]       Inpatient: Payor: MEDICARE / Plan: MEDICARE PART A AND B / Product Type: *No Product type* /     ASSESSMENT:     REHAB RECOMMENDATIONS:   Recommendation to date pending progress:  Setting:  Home Health Therapy  Potentially STR if capacity remains so limited     Equipment:    To Be Determined     ASSESSMENT:  Ms. Ayanna Chaves  was seated in recliner on 3L NC upon entering the room and agreeable to therapy. Today, pt demonstrates slow progress toward established goals. This session, pt required CG(A), inc time and cueing for all activity with additional use of RW/ gait belt for amb of 10'x2. Pt requiring multiple standing rest breaks while ambulating d/t fatigue and transient dizziness although she ultimately did well to avoid any LOB/ miss-steps. Gait is slow and shuffling c notably inc sway.  Pt significantly limited at this time with regard to activity tolerance and required seated rest break c max inc time to recover between trials. At this time, pt remains a good candidate for skilled PT and will continue to benefit from advancing POC. At end of session, pt was positioned to comfort in chair with all needs in reach. RN was made aware of pt performance.         SUBJECTIVE:   Ms. Tisha Baig states, \"That just takes it out of me\"     Social/Functional Lives With: Spouse  Type of Home: House  Home Layout: One level  Bathroom Shower/Tub: Walk-in shower  Bathroom Equipment: Grab bars in shower, Shower chair  Home Equipment: Gayl Nice, rolling, Oxygen  Receives Help From: Family  ADL Assistance: Needs assistance  Ambulation Assistance: Needs assistance  Occupation: Retired  OBJECTIVE:     PAIN: VITALS / O2: PRECAUTION / Brittany Lightning / DRAINS:   Pre Treatment: 0/10         Post Treatment: 0/10 Vitals        Oxygen   3L NC; no drop below 90% Continuous Pulse Oximetry, IV, and Telemetry     RESTRICTIONS/PRECAUTIONS:  Restrictions/Precautions  Restrictions/Precautions: Fall Risk  Restrictions/Precautions: Fall Risk     MOBILITY: I Mod I S SBA CGA Min Mod Max Total  NT x2 Comments:   Bed Mobility    Rolling [] [] [] [] [] [] [] [] [] [x] []    Supine to Sit [] [] [] [] [] [] [] [] [] [x] []    Scooting [] [] [] [] [] [] [] [] [] [x] []    Sit to Supine [] [] [] [] [] [] [] [] [] [x] []    Transfers    Sit to Stand [] [] [] [] [x] [] [] [] [] [] []    Bed to Chair [] [] [] [] [] [] [] [] [] [x] []    Stand to Sit [] [] [] [] [x] [] [] [] [] [] []     [] [] [] [] [] [] [] [] [] [] []    I=Independent, Mod I=Modified Independent, S=Supervision, SBA=Standby Assistance, CGA=Contact Guard Assistance,   Min=Minimal Assistance, Mod=Moderate Assistance, Max=Maximal Assistance, Total=Total Assistance, NT=Not Tested    BALANCE: Good Fair+ Fair Fair- Poor NT Comments   Sitting Static [x] [] [] [] [] []    Sitting Dynamic [] [x] [] [] [] []              Standing Static [] [x] [] [] [] []    Standing Dynamic [] [x] [x] [] [] []      GAIT: I Mod I S SBA CGA Min Mod Max Total  NT x2 Comments:   Level of Assistance [] [] [] [] [x] [] [] [] [] [] []    Distance   10'x2 (extended break to recover b/w trials)    DME Gait Belt and Rolling Walker    Gait Quality Decreased german , Decreased step clearance, Decreased step length, Narrow base of support, Shuffling , and Trunk sway increased    Weightbearing Status      Stairs      I=Independent, Mod I=Modified Independent, S=Supervision, SBA=Standby Assistance, CGA=Contact Guard Assistance,   Min=Minimal Assistance, Mod=Moderate Assistance, Max=Maximal Assistance, Total=Total Assistance, NT=Not Tested    PLAN:   FREQUENCY AND DURATION: 3 times/week for duration of hospital stay or until stated goals are met, whichever comes first.    TREATMENT:   TREATMENT:   Therapeutic Activity (23 Minutes): Therapeutic activity included Scooting, Transfer Training, Ambulation on level ground, Sitting balance , and Standing balance to improve functional Activity tolerance, Balance, Mobility, and Strength.     TREATMENT GRID:  N/A    AFTER TREATMENT PRECAUTIONS: Bed/Chair Locked, Call light within reach, Chair, Needs within reach, and RN notified    INTERDISCIPLINARY COLLABORATION:  RN/ PCT and PT/ PTA    EDUCATION:      TIME IN/OUT:  Time In: 1441  Time Out: 8001 [address]  Minutes: 23    Patricio Bryant PT

## 2022-12-14 NOTE — PROGRESS NOTES
[address]  Admission Date: 12/9/2022         Daily Progress Note: 12/14/2022    The patient's chart is reviewed and the patient is discussed with the staff. Background: 78yo WF with h/o CAD s/p 15 stents, HTN, HLD, nsclc stage III (completed chemo/radiation, on immunotherapy), very severe COPD (evaluated and turned down for transplant x 2) with chronic hypoxic respiratory failure on 2L O2 continuously, and anxiety/depression, presented to ER with chest pain and shortness of breath on 12/9. Took nitro at home with relief of chest pain. She was found to have elevated BNP and troponin in the ER. She was admitted by cardiology and started on heparin gtt. We were consulted secondary to wheezing. Subjective: Today the patient is satting well on 3L. Satting 99%. Was weaned to 2L and then 1L. Sputum culture with NGTD. States her breathing feels about the same. Having some decreased appetite, mild nausea.        Current Facility-Administered Medications   Medication Dose Route Frequency    losartan (COZAAR) tablet 25 mg  25 mg Oral Daily    doxycycline hyclate (VIBRAMYCIN) capsule 100 mg  100 mg Oral 2 times per day    medicated lip ointment (BLISTEX)   Topical PRN    guaiFENesin-dextromethorphan (ROBITUSSIN DM) 100-10 MG/5ML syrup 5 mL  5 mL Oral Q4H PRN    methylPREDNISolone sodium (SOLU-MEDROL) injection 60 mg  60 mg IntraVENous Q12H    budesonide (PULMICORT) nebulizer suspension 500 mcg  0.5 mg Nebulization BID    sodium chloride (Inhalant) 3 % nebulizer solution 4 mL  4 mL Nebulization BID    albuterol (PROVENTIL) nebulizer solution 2.5 mg  2.5 mg Nebulization Q6H WA    HYDROcodone homatropine (HYCODAN) 5-1.5 MG/5ML solution 5 mL  5 mL Oral Q4H PRN    levalbuterol (XOPENEX) nebulizer solution 1.25 mg  1.25 mg Nebulization Q4H PRN    acetaminophen (TYLENOL) tablet 650 mg  650 mg Oral Q4H PRN    acyclovir (ZOVIRAX) tablet 800 mg  800 mg Oral BID    albuterol (PROVENTIL) nebulizer solution 2.5 mg  2.5 mg Nebulization Q4H PRN    aspirin chewable tablet 81 mg  81 mg Oral Daily    atorvastatin (LIPITOR) tablet 80 mg  80 mg Oral Nightly    cetirizine (ZYRTEC) tablet 10 mg  10 mg Oral Daily PRN    clopidogrel (PLAVIX) tablet 75 mg  75 mg Oral Daily    escitalopram (LEXAPRO) tablet 10 mg  10 mg Oral Daily    ezetimibe (ZETIA) tablet 10 mg  10 mg Oral Nightly    gabapentin (NEURONTIN) capsule 300 mg  300 mg Oral TID    HYDROcodone-acetaminophen (NORCO) 5-325 MG per tablet 2 tablet  2 tablet Oral Q6H PRN    magic (miracle) mouthwash with nystatin  5 mL Swish & Swallow 4x Daily PRN    methIMAzole (TAPAZOLE) tablet 5 mg  5 mg Oral Daily    metoprolol succinate (TOPROL XL) extended release tablet 100 mg  100 mg Oral Daily    ondansetron (ZOFRAN-ODT) disintegrating tablet 4 mg  4 mg Oral Q8H PRN    pantoprazole (PROTONIX) tablet 40 mg  40 mg Oral BID    Roflumilast (DALIRESP) tablet 500 mcg (Patient Supplied)  500 mcg Oral Nightly    tiotropium (SPIRIVA RESPIMAT) 2.5 MCG/ACT inhaler 2 puff  2 puff Inhalation Daily    [Held by provider] torsemide (DEMADEX) tablet 10 mg  10 mg Oral Daily    traMADol (ULTRAM) tablet 100 mg  100 mg Oral 4x daily    sodium chloride flush 0.9 % injection 5-40 mL  5-40 mL IntraVENous 2 times per day    sodium chloride flush 0.9 % injection 5-40 mL  5-40 mL IntraVENous PRN    0.9 % sodium chloride infusion   IntraVENous PRN    polyethylene glycol (GLYCOLAX) packet 17 g  17 g Oral Daily PRN    potassium chloride (KLOR-CON M) extended release tablet 40 mEq  40 mEq Oral PRN    Or    potassium bicarb-citric acid (EFFER-K) effervescent tablet 40 mEq  40 mEq Oral PRN    Or    potassium chloride 10 mEq/100 mL IVPB (Peripheral Line)  10 mEq IntraVENous PRN    magnesium sulfate 2000 mg in 50 mL IVPB premix  2,000 mg IntraVENous PRN    ondansetron (ZOFRAN) injection 4 mg  4 mg IntraVENous Q6H PRN    aluminum & magnesium hydroxide-simethicone (MAALOX) 200-200-20 MG/5ML suspension 30 mL  30 mL Oral Q6H PRN    nitroGLYCERIN (NITROSTAT) SL tablet 0.4 mg  0.4 mg SubLINGual Q5 Min PRN    0.9 % sodium chloride infusion   IntraVENous Continuous    [Held by provider] azithromycin (ZITHROMAX) tablet 500 mg  500 mg Oral Once per day on Mon Wed Fri    guaiFENesin [facility]) extended release tablet 600 mg  600 mg Oral BID    heparin (porcine) injection 3,540 Units  60 Units/kg IntraVENous PRN    heparin (porcine) injection 1,770 Units  30 Units/kg IntraVENous PRN    heparin 25,000 units in dextrose 5% 250 mL (premix) infusion  5-30 Units/kg/hr IntraVENous Continuous    LORazepam (ATIVAN) injection 1 mg  1 mg IntraVENous Q6H PRN     Review of Systems: Comprehensive ROS negative except in HPI  Objective:   Blood pressure 139/69, pulse 80, temperature 97.9 °F (36.6 °C), temperature source Oral, resp. rate 16, weight 131 lb 6.3 oz (59.6 kg), SpO2 98 %. Intake/Output Summary (Last 24 hours) at 12/14/2022 1049  Last data filed at 12/14/2022 0851  Gross per 24 hour   Intake 960 ml   Output 1350 ml   Net -390 ml     Physical Exam:   Constitutional:  the patient is well developed and in no acute distress, on 5L O2-sat 100%  EENMT:  Sclera clear, pupils equal, oral mucosa moist  Respiratory: symmetric chest rise. CTA B, no w/r/r  Cardiovascular:  RRR without M,G,R. There is no lower extremity edema. Gastrointestinal: soft and non-tender; with positive bowel sounds. Musculoskeletal: warm without cyanosis. Normal muscle tone. Skin:  no jaundice or rashes, no wounds   Neurologic: symmetric strength, fluent speech  Psychiatric:  calm, appropriate, oriented x 4    Imaging: I performed an independent interpretation of the patient's images.   CXR: 12/10/22      CT Chest 12/9/22      LAB:  Recent Labs     12/12/22  0543 12/13/22  0510 12/14/22  0502   WBC 5.2 7.7 9.4   HGB 8.2* 8.4* 8.7*   HCT 26.2* 26.9* 28.0*    226 245     Recent Labs     12/12/22  0543 12/13/22  0510 12/14/22  0502    135 136   K 4.6 4.6 4.3  101 100*   CO2 31 28 32   BUN 12 17 16   CREATININE 0.60 0.60 0.70   MG 2.2  --   --      No results for input(s): TROPHS, NTPROBNP, CRP, ESR in the last 72 hours. Recent Labs     12/12/22  0543 12/13/22  0510 12/14/22  0502   GLUCOSE 138* 125* 140*      Microbiology:   Recent Labs     12/13/22 1942   CULTURE No growth after short period of incubation. Further results to follow after overnight incubation. ECHO: 12/09/22    TRANSTHORACIC ECHOCARDIOGRAM (TTE) COMPLETE (CONTRAST/BUBBLE/3D PRN) 12/10/2022  7:40 PM (Final)    Interpretation Summary    Left Ventricle: Low normal left ventricular systolic function. EF by 2D Simpsons Biplane is 52%. Left ventricle size is normal. Normal wall thickness. Normal wall motion. Normal diastolic function. Right Ventricle: Right ventricle size is normal. Normal systolic function. Aortic Valve: Tricuspid valve. Mildly thickened cusps. Moderate regurgitation. AV PHT is 384.0 ms. No stenosis. Mitral Valve: Mildly thickened leaflet, at the anterior and posterior leaflets. Moderate to severe regurgitation. Tricuspid Valve: Mild to moderate regurgitation. The estimated RVSP is 42 mmHg. Right Atrium: Right atrium size is normal.  Echodense structure noted in the right atrium may represent indwelling central venous catheter. IVC/SVC: IVC diameter is less than or equal to 21 mm and decreases greater than 50% during inspiration; therefore the estimated right atrial pressure is normal (~3 mmHg). Technical qualifiers: Technically difficult study, color flow Doppler was performed and pulse wave and/or continuous wave Doppler was performed. Signed by: Betsy Marcelo DO on 12/10/2022  7:40 PM    Assessment and Plan:  (Medical Decision Making)   Principal Problem:    Chest pain  Plan: heparin drip now off. Getting plavix, asa, nitropaste. Cardiology feels likely demand ischemia. Active Problems:    Dyspnea  Plan: treating for COPD exacerbation. Gradual improvement. Shingles  Plan: on acyclovir. Non-small cell lung cancer [facility])  Plan: Oncology has seen with CT concerning for progression of cancer on the left. Currently on durvalumab immunotherapy monthly. Onc recommends f/u OP and will consider PET scan at that time to help determine progression of disease vs post radiation changes. Acute on Chronic respiratory failure with hypoxia (HCC)  Plan: due to COPD exacerbation, continue to wean O2. Down to 1L at rest. PT order placed to work on debility. Monitor exertional O2 needs. COPD, very severe ([address]  Plan: getting solumedrol 60 mg q12, albuterol, pulmicort, azithro, atrovent, on azithro three times per week. Azithro held while on daily abx. Getting doxy and follow up sputum cultures. DNR (do not resuscitate)  Plan: appropriate. Palliative care seeing patient. More than 50% of the time documented was spent in face-to-face contact with the patient and in the care of the patient on the floor/unit where the patient is located. In this split/shared evaluation I performed performed a medically appropriate history and exam, counseled and educated the patient and/or family member, ordered medications, tests or procedures, documented information in EMR, and coordinated care. which accounted for 14 minutes of clinical time. Cally Beltran MD      In this split/shared evaluation I performed reviewed the patients's H&P, available images, labs, cultures. , discussed case in detail with NPP, performed a medically appropriate history and exam, counseled and educated the patient and/or family member, ordered and/or reviewed medications, tests or procedures, documented information in EMR, independently interpreted images, and coordinated care. which accounted for 16 minutes clinical time. Impression:     Pt on 3L at rest, 5L with exertion. Still more short of breath that baseline. More productive cough today.  Will order sputum cultures and change tiw azithro to doxy. Wanting to work with PT. Ordered. Sees palliative as oupt. Agree would benfit from inpt conversation as well. Will need to discuss code states. Discussed with her there may be limitations to her further improvement. Cont albuterol, pulmicort, solumedrol, spiriva.       Alis Vanessa MD

## 2022-12-14 NOTE — PROGRESS NOTES
[facility] CARDIOLOGY PROGRESS NOTE           12/14/2022 8:52 AM    Admit Date: 12/9/2022         Subjective: Remains very SOB with ambulation. ROS:  Cardiovascular:  As noted above    Objective:      Vitals:    12/14/22 0514 12/14/22 0752 12/14/22 0816 12/14/22 0847   BP: 124/67 139/69     Pulse: 73 85 80    Resp: 16 18 16 16   Temp: 97.4 °F (36.3 °C) 97.9 °F (36.6 °C)     TempSrc:  Oral     SpO2: 99% 98% 98%    Weight: 131 lb 6.3 oz (59.6 kg)              Physical Exam:  General: Well Developed, Well Nourished, No Acute Distress, Alert & Oriented x 3, Appropriate mood  Neck: supple, no JVD  Heart: S1S2 with RRR without murmurs or gallops  Lungs: distant BS  Abd: soft, nontender, nondistended, with good bowel sounds  Ext: no edema bilaterally  Skin: warm and dry      Data Review:   Recent Labs     12/12/22  0543 12/13/22  0510 12/14/22  0502    135 136   K 4.6 4.6 4.3   MG 2.2  --   --    BUN 12 17 16   WBC 5.2 7.7 9.4   HGB 8.2* 8.4* 8.7*   HCT 26.2* 26.9* 28.0*    226 245       No results for input(s): TNIPOC in the last 72 hours. Invalid input(s): TROIQ        Assessment/Plan:     Principal Problem:    Acute on chronic respiratory failure with hypoxia (HCC)  Active Problems:    Chest pain    Dyspnea    Non-small cell lung cancer (HCC)    Hyperthyroidism    COPD exacerbation (HCC)    Debility    Fatigue    Encounter for palliative care    Essential hypertension, benign    Iron deficiency anemia due to chronic blood loss    Chronic anxiety    Coronary artery disease involving native coronary artery of native heart without angina pectoris    DNR (do not resuscitate)    Hyperlipidemia    Nonrheumatic mitral valve regurgitation    Depression  Resolved Problems:    * No resolved hospital problems.  *    A/P  1) chest pain - likely demand ischemia and not ACS  2) Resp failure/COPD -steroids and ABX per pulm team, mild subjective improvement, we are awaiting improvement with

## 2022-12-15 PROBLEM — Z71.89 ACP (ADVANCE CARE PLANNING): Status: ACTIVE | Noted: 2022-01-01

## 2022-12-15 PROBLEM — J39.8 CHRONIC MUCUS HYPERSECRETION, RESPIRATORY: Status: ACTIVE | Noted: 2022-01-01

## 2022-12-15 LAB
ANION GAP SERPL CALC-SCNC: 6 MMOL/L (ref 2–11)
BASOPHILS # BLD: 0 K/UL (ref 0–0.2)
BASOPHILS NFR BLD: 0 % (ref 0–2)
BUN SERPL-MCNC: 19 MG/DL (ref 8–23)
CALCIUM SERPL-MCNC: 9 MG/DL (ref 8.3–10.4)
CHLORIDE SERPL-SCNC: 98 MMOL/L (ref 101–110)
CO2 SERPL-SCNC: 31 MMOL/L (ref 21–32)
CREAT SERPL-MCNC: 0.6 MG/DL (ref 0.6–1)
DIFFERENTIAL METHOD BLD: ABNORMAL
EOSINOPHIL # BLD: 0 K/UL (ref 0–0.8)
EOSINOPHIL NFR BLD: 0 % (ref 0.5–7.8)
ERYTHROCYTE [DISTWIDTH] IN BLOOD BY AUTOMATED COUNT: 17.8 % (ref 11.9–14.6)
GLUCOSE SERPL-MCNC: 146 MG/DL (ref 65–100)
HCT VFR BLD AUTO: 28.3 % (ref 35.8–46.3)
HGB BLD-MCNC: 8.9 G/DL (ref 11.7–15.4)
IMM GRANULOCYTES # BLD AUTO: 0.1 K/UL (ref 0–0.5)
IMM GRANULOCYTES NFR BLD AUTO: 1 % (ref 0–5)
LYMPHOCYTES # BLD: 0.4 K/UL (ref 0.5–4.6)
LYMPHOCYTES NFR BLD: 4 % (ref 13–44)
MCH RBC QN AUTO: 31.6 PG (ref 26.1–32.9)
MCHC RBC AUTO-ENTMCNC: 31.4 G/DL (ref 31.4–35)
MCV RBC AUTO: 100.4 FL (ref 82–102)
MONOCYTES # BLD: 0.6 K/UL (ref 0.1–1.3)
MONOCYTES NFR BLD: 6 % (ref 4–12)
NEUTS SEG # BLD: 9.3 K/UL (ref 1.7–8.2)
NEUTS SEG NFR BLD: 89 % (ref 43–78)
NRBC # BLD: 0 K/UL (ref 0–0.2)
PLATELET # BLD AUTO: 263 K/UL (ref 150–450)
PMV BLD AUTO: 10.1 FL (ref 9.4–12.3)
POTASSIUM SERPL-SCNC: 4.1 MMOL/L (ref 3.5–5.1)
RBC # BLD AUTO: 2.82 M/UL (ref 4.05–5.2)
SODIUM SERPL-SCNC: 135 MMOL/L (ref 133–143)
WBC # BLD AUTO: 10.4 K/UL (ref 4.3–11.1)

## 2022-12-15 PROCEDURE — 6370000000 HC RX 637 (ALT 250 FOR IP): Performed by: INTERNAL MEDICINE

## 2022-12-15 PROCEDURE — 1100000003 HC PRIVATE W/ TELEMETRY

## 2022-12-15 PROCEDURE — 6360000002 HC RX W HCPCS: Performed by: INTERNAL MEDICINE

## 2022-12-15 PROCEDURE — 80048 BASIC METABOLIC PNL TOTAL CA: CPT

## 2022-12-15 PROCEDURE — 2580000003 HC RX 258: Performed by: NURSE PRACTITIONER

## 2022-12-15 PROCEDURE — 94640 AIRWAY INHALATION TREATMENT: CPT

## 2022-12-15 PROCEDURE — 99233 SBSQ HOSP IP/OBS HIGH 50: CPT | Performed by: NURSE PRACTITIONER

## 2022-12-15 PROCEDURE — 6370000000 HC RX 637 (ALT 250 FOR IP): Performed by: NURSE PRACTITIONER

## 2022-12-15 PROCEDURE — 6370000000 HC RX 637 (ALT 250 FOR IP): Performed by: PHYSICIAN ASSISTANT

## 2022-12-15 PROCEDURE — 6370000000 HC RX 637 (ALT 250 FOR IP): Performed by: STUDENT IN AN ORGANIZED HEALTH CARE EDUCATION/TRAINING PROGRAM

## 2022-12-15 PROCEDURE — 99232 SBSQ HOSP IP/OBS MODERATE 35: CPT | Performed by: INTERNAL MEDICINE

## 2022-12-15 PROCEDURE — 2700000000 HC OXYGEN THERAPY PER DAY

## 2022-12-15 PROCEDURE — 94761 N-INVAS EAR/PLS OXIMETRY MLT: CPT

## 2022-12-15 PROCEDURE — 2580000003 HC RX 258: Performed by: INTERNAL MEDICINE

## 2022-12-15 PROCEDURE — 97530 THERAPEUTIC ACTIVITIES: CPT

## 2022-12-15 PROCEDURE — 99231 SBSQ HOSP IP/OBS SF/LOW 25: CPT | Performed by: INTERNAL MEDICINE

## 2022-12-15 PROCEDURE — 85025 COMPLETE CBC W/AUTO DIFF WBC: CPT

## 2022-12-15 RX ORDER — GUAIFENESIN 600 MG/1
1200 TABLET, EXTENDED RELEASE ORAL 2 TIMES DAILY
Status: DISCONTINUED | OUTPATIENT
Start: 2022-12-15 | End: 2022-12-21 | Stop reason: HOSPADM

## 2022-12-15 RX ORDER — DOCUSATE SODIUM 100 MG/1
200 CAPSULE, LIQUID FILLED ORAL NIGHTLY
Status: DISCONTINUED | OUTPATIENT
Start: 2022-12-15 | End: 2022-12-21 | Stop reason: HOSPADM

## 2022-12-15 RX ORDER — METHYLPREDNISOLONE SODIUM SUCCINATE 40 MG/ML
40 INJECTION, POWDER, LYOPHILIZED, FOR SOLUTION INTRAMUSCULAR; INTRAVENOUS EVERY 12 HOURS
Status: DISCONTINUED | OUTPATIENT
Start: 2022-12-15 | End: 2022-12-16

## 2022-12-15 RX ORDER — DOCUSATE SODIUM 100 MG/1
100 CAPSULE, LIQUID FILLED ORAL NIGHTLY
Status: DISCONTINUED | OUTPATIENT
Start: 2022-12-15 | End: 2022-12-15

## 2022-12-15 RX ADMIN — ALBUTEROL SULFATE 2.5 MG: 2.5 SOLUTION RESPIRATORY (INHALATION) at 19:18

## 2022-12-15 RX ADMIN — ACYCLOVIR 800 MG: 800 TABLET ORAL at 21:15

## 2022-12-15 RX ADMIN — METOPROLOL SUCCINATE 100 MG: 100 TABLET, EXTENDED RELEASE ORAL at 08:08

## 2022-12-15 RX ADMIN — TRAMADOL HYDROCHLORIDE 100 MG: 50 TABLET ORAL at 08:08

## 2022-12-15 RX ADMIN — EZETIMIBE 10 MG: 10 TABLET ORAL at 21:15

## 2022-12-15 RX ADMIN — GABAPENTIN 300 MG: 300 CAPSULE ORAL at 08:06

## 2022-12-15 RX ADMIN — ACYCLOVIR 800 MG: 800 TABLET ORAL at 08:07

## 2022-12-15 RX ADMIN — ESCITALOPRAM OXALATE 10 MG: 10 TABLET ORAL at 08:07

## 2022-12-15 RX ADMIN — ALBUTEROL SULFATE 2.5 MG: 2.5 SOLUTION RESPIRATORY (INHALATION) at 14:39

## 2022-12-15 RX ADMIN — GABAPENTIN 300 MG: 300 CAPSULE ORAL at 21:15

## 2022-12-15 RX ADMIN — BUDESONIDE 500 MCG: 0.5 INHALANT RESPIRATORY (INHALATION) at 07:10

## 2022-12-15 RX ADMIN — TRAMADOL HYDROCHLORIDE 100 MG: 50 TABLET ORAL at 12:20

## 2022-12-15 RX ADMIN — SODIUM CHLORIDE, PRESERVATIVE FREE 10 ML: 5 INJECTION INTRAVENOUS at 08:16

## 2022-12-15 RX ADMIN — GUAIFENESIN 1200 MG: 600 TABLET ORAL at 21:15

## 2022-12-15 RX ADMIN — SODIUM CHLORIDE, PRESERVATIVE FREE 10 ML: 5 INJECTION INTRAVENOUS at 20:25

## 2022-12-15 RX ADMIN — PANTOPRAZOLE SODIUM 40 MG: 40 TABLET, DELAYED RELEASE ORAL at 08:06

## 2022-12-15 RX ADMIN — ALBUTEROL SULFATE 2.5 MG: 2.5 SOLUTION RESPIRATORY (INHALATION) at 07:10

## 2022-12-15 RX ADMIN — DOCUSATE SODIUM 200 MG: 100 CAPSULE, LIQUID FILLED ORAL at 21:16

## 2022-12-15 RX ADMIN — METHYLPREDNISOLONE SODIUM SUCCINATE 60 MG: 125 INJECTION, POWDER, FOR SOLUTION INTRAMUSCULAR; INTRAVENOUS at 08:15

## 2022-12-15 RX ADMIN — DOXYCYCLINE HYCLATE 100 MG: 100 CAPSULE ORAL at 21:16

## 2022-12-15 RX ADMIN — METHYLPREDNISOLONE SODIUM SUCCINATE 40 MG: 40 INJECTION, POWDER, FOR SOLUTION INTRAMUSCULAR; INTRAVENOUS at 21:16

## 2022-12-15 RX ADMIN — METHIMAZOLE 5 MG: 5 TABLET ORAL at 08:06

## 2022-12-15 RX ADMIN — GABAPENTIN 300 MG: 300 CAPSULE ORAL at 13:55

## 2022-12-15 RX ADMIN — TRAMADOL HYDROCHLORIDE 100 MG: 50 TABLET ORAL at 21:15

## 2022-12-15 RX ADMIN — TRAMADOL HYDROCHLORIDE 100 MG: 50 TABLET ORAL at 16:19

## 2022-12-15 RX ADMIN — CLOPIDOGREL BISULFATE 75 MG: 75 TABLET ORAL at 08:06

## 2022-12-15 RX ADMIN — DOXYCYCLINE HYCLATE 100 MG: 100 CAPSULE ORAL at 08:07

## 2022-12-15 RX ADMIN — GUAIFENESIN 600 MG: 600 TABLET ORAL at 08:07

## 2022-12-15 RX ADMIN — LOSARTAN POTASSIUM 25 MG: 25 TABLET, FILM COATED ORAL at 08:07

## 2022-12-15 RX ADMIN — Medication 4 ML: at 07:10

## 2022-12-15 RX ADMIN — BUDESONIDE 500 MCG: 0.5 INHALANT RESPIRATORY (INHALATION) at 19:19

## 2022-12-15 RX ADMIN — ASPIRIN 81 MG: 81 TABLET, CHEWABLE ORAL at 08:06

## 2022-12-15 RX ADMIN — PANTOPRAZOLE SODIUM 40 MG: 40 TABLET, DELAYED RELEASE ORAL at 21:16

## 2022-12-15 RX ADMIN — Medication 4 ML: at 19:20

## 2022-12-15 RX ADMIN — ATORVASTATIN CALCIUM 80 MG: 80 TABLET, FILM COATED ORAL at 21:15

## 2022-12-15 ASSESSMENT — PAIN - FUNCTIONAL ASSESSMENT
PAIN_FUNCTIONAL_ASSESSMENT: ACTIVITIES ARE NOT PREVENTED

## 2022-12-15 ASSESSMENT — PAIN SCALES - GENERAL
PAINLEVEL_OUTOF10: 0
PAINLEVEL_OUTOF10: 6
PAINLEVEL_OUTOF10: 0
PAINLEVEL_OUTOF10: 0
PAINLEVEL_OUTOF10: 8
PAINLEVEL_OUTOF10: 7

## 2022-12-15 ASSESSMENT — PAIN DESCRIPTION - DESCRIPTORS
DESCRIPTORS: SHARP
DESCRIPTORS: SHARP;SHOOTING
DESCRIPTORS: SHARP;STABBING

## 2022-12-15 ASSESSMENT — PAIN DESCRIPTION - ORIENTATION
ORIENTATION: MID;UPPER;ANTERIOR;POSTERIOR

## 2022-12-15 ASSESSMENT — PAIN DESCRIPTION - LOCATION
LOCATION: CHEST;BACK
LOCATION: RIB CAGE;BACK
LOCATION: CHEST;BACK

## 2022-12-15 NOTE — PROGRESS NOTES
Spoke with Val with [facility] about IP Rehab consult. Cancelled the consult and placed order for Physical Medicine. She reported that [facility] wanted the pt to be assessed for [facility]. Currently katelin is recommending [facility] and would not be appropriate for that level of care, if her capacity remains limited she could potentially be recommended for STR. Pt reported that she \"won't leave the hospital unless she goes to rehab. \" GAURAV explained to the pt that therapies recommendation determines what her insurance will cover. Pt not happy with this writers explanation but spoke with PT and will pick her up tomorrow and work with her.

## 2022-12-15 NOTE — PROGRESS NOTES
Maria A Farrell 62 [address]  Admission Date: 12/9/2022         Daily Progress Note: 12/15/2022    The patient's chart is reviewed and the patient is discussed with the staff. Background: 76yo WF with h/o CAD s/p 15 stents, HTN, HLD, nsclc stage III (completed chemo/radiation, on immunotherapy), very severe COPD (evaluated and turned down for transplant x 2) with chronic hypoxic respiratory failure on 2L O2 continuously, and anxiety/depression, presented to ER with chest pain and shortness of breath on 12/9. Took nitro at home with relief of chest pain. She was found to have elevated BNP and troponin in the ER. She was admitted by cardiology and started on heparin gtt. We were consulted secondary to wheezing. Subjective:     She is on 2 lpm and states she wears anywhere from 1-3lpm at home. She is concerned that she is too weak to care for herself at home. She wants rehab. No wheezing noted now. Still has a congested cough and having a hard time clearing secretions. Palliative care has seen and she wishes to continue all interventions.      Current Facility-Administered Medications   Medication Dose Route Frequency    docusate sodium (COLACE) capsule 200 mg  200 mg Oral Nightly    losartan (COZAAR) tablet 25 mg  25 mg Oral Daily    doxycycline hyclate (VIBRAMYCIN) capsule 100 mg  100 mg Oral 2 times per day    medicated lip ointment (BLISTEX)   Topical PRN    guaiFENesin-dextromethorphan (ROBITUSSIN DM) 100-10 MG/5ML syrup 5 mL  5 mL Oral Q4H PRN    methylPREDNISolone sodium (SOLU-MEDROL) injection 60 mg  60 mg IntraVENous Q12H    budesonide (PULMICORT) nebulizer suspension 500 mcg  0.5 mg Nebulization BID    sodium chloride (Inhalant) 3 % nebulizer solution 4 mL  4 mL Nebulization BID    albuterol (PROVENTIL) nebulizer solution 2.5 mg  2.5 mg Nebulization Q6H WA    HYDROcodone homatropine (HYCODAN) 5-1.5 MG/5ML solution 5 mL  5 mL Oral Q4H PRN    levalbuterol (XOPENEX) nebulizer solution 1.25 mg 1.25 mg Nebulization Q4H PRN    acetaminophen (TYLENOL) tablet 650 mg  650 mg Oral Q4H PRN    acyclovir (ZOVIRAX) tablet 800 mg  800 mg Oral BID    albuterol (PROVENTIL) nebulizer solution 2.5 mg  2.5 mg Nebulization Q4H PRN    aspirin chewable tablet 81 mg  81 mg Oral Daily    atorvastatin (LIPITOR) tablet 80 mg  80 mg Oral Nightly    cetirizine (ZYRTEC) tablet 10 mg  10 mg Oral Daily PRN    clopidogrel (PLAVIX) tablet 75 mg  75 mg Oral Daily    escitalopram (LEXAPRO) tablet 10 mg  10 mg Oral Daily    ezetimibe (ZETIA) tablet 10 mg  10 mg Oral Nightly    gabapentin (NEURONTIN) capsule 300 mg  300 mg Oral TID    HYDROcodone-acetaminophen (NORCO) 5-325 MG per tablet 2 tablet  2 tablet Oral Q6H PRN    magic (miracle) mouthwash with nystatin  5 mL Swish & Swallow 4x Daily PRN    methIMAzole (TAPAZOLE) tablet 5 mg  5 mg Oral Daily    metoprolol succinate (TOPROL XL) extended release tablet 100 mg  100 mg Oral Daily    ondansetron (ZOFRAN-ODT) disintegrating tablet 4 mg  4 mg Oral Q8H PRN    pantoprazole (PROTONIX) tablet 40 mg  40 mg Oral BID    Roflumilast (DALIRESP) tablet 500 mcg (Patient Supplied)  500 mcg Oral Nightly    tiotropium (SPIRIVA RESPIMAT) 2.5 MCG/ACT inhaler 2 puff  2 puff Inhalation Daily    [Held by provider] torsemide (DEMADEX) tablet 10 mg  10 mg Oral Daily    traMADol (ULTRAM) tablet 100 mg  100 mg Oral 4x daily    sodium chloride flush 0.9 % injection 5-40 mL  5-40 mL IntraVENous 2 times per day    sodium chloride flush 0.9 % injection 5-40 mL  5-40 mL IntraVENous PRN    0.9 % sodium chloride infusion   IntraVENous PRN    polyethylene glycol (GLYCOLAX) packet 17 g  17 g Oral Daily PRN    potassium chloride (KLOR-CON M) extended release tablet 40 mEq  40 mEq Oral PRN    Or    potassium bicarb-citric acid (EFFER-K) effervescent tablet 40 mEq  40 mEq Oral PRN    Or    potassium chloride 10 mEq/100 mL IVPB (Peripheral Line)  10 mEq IntraVENous PRN    magnesium sulfate 2000 mg in 50 mL IVPB premix 2,000 mg IntraVENous PRN    ondansetron (ZOFRAN) injection 4 mg  4 mg IntraVENous Q6H PRN    aluminum & magnesium hydroxide-simethicone (MAALOX) 200-200-20 MG/5ML suspension 30 mL  30 mL Oral Q6H PRN    nitroGLYCERIN (NITROSTAT) SL tablet 0.4 mg  0.4 mg SubLINGual Q5 Min PRN    0.9 % sodium chloride infusion   IntraVENous Continuous    [Held by provider] azithromycin (ZITHROMAX) tablet 500 mg  500 mg Oral Once per day on Mon Wed Fri    guaiFENesin [facility]) extended release tablet 600 mg  600 mg Oral BID    heparin (porcine) injection 3,540 Units  60 Units/kg IntraVENous PRN    heparin (porcine) injection 1,770 Units  30 Units/kg IntraVENous PRN    heparin 25,000 units in dextrose 5% 250 mL (premix) infusion  5-30 Units/kg/hr IntraVENous Continuous    LORazepam (ATIVAN) injection 1 mg  1 mg IntraVENous Q6H PRN     Review of Systems: Comprehensive ROS negative except in HPI  Objective:   Blood pressure (!) 115/47, pulse 72, temperature 98 °F (36.7 °C), temperature source Oral, resp. rate 16, weight 131 lb 6.3 oz (59.6 kg), SpO2 95 %. Intake/Output Summary (Last 24 hours) at 12/15/2022 1330  Last data filed at 12/15/2022 1222  Gross per 24 hour   Intake 1000 ml   Output 400 ml   Net 600 ml     Physical Exam:   Constitutional:  the patient is thin, ill appearing and in no acute distress  EENMT:  Sclera clear, pupils equal, oral mucosa moist  Respiratory: symmetric chest rise. Clear but distant throughout, on 2 lpm NC  Cardiovascular:  RRR without M,G,R. There is no lower extremity edema. Gastrointestinal: soft and non-tender; with positive bowel sounds. Musculoskeletal: warm without cyanosis. Normal muscle tone. Skin:  no jaundice or rashes, no wounds   Neurologic: symmetric strength, fluent speech  Psychiatric:  calm, appropriate, oriented x 4    Imaging: I performed an independent interpretation of the patient's images.   CXR:   12/10 12/9      CT chest 12/9    LAB:  Recent Labs     12/13/22  0510 12/14/22  0502 12/15/22  0555   WBC 7.7 9.4 10.4   HGB 8.4* 8.7* 8.9*   HCT 26.9* 28.0* 28.3*    245 263     Recent Labs     12/13/22  0510 12/14/22  0502 12/15/22  0555    136 135   K 4.6 4.3 4.1    100* 98*   CO2 28 32 31   BUN 17 16 19   CREATININE 0.60 0.70 0.60     No results for input(s): TROPHS, NTPROBNP, CRP, ESR in the last 72 hours. Recent Labs     12/13/22  0510 12/14/22  0502 12/15/22  0555   GLUCOSE 125* 140* 146*      Microbiology:   Recent Labs     12/13/22 1942   CULTURE MODERATE NORMAL RESPIRATORY JAY     ECHO: 12/09/22    TRANSTHORACIC ECHOCARDIOGRAM (TTE) COMPLETE (CONTRAST/BUBBLE/3D PRN) 12/10/2022  7:40 PM (Final)    Interpretation Summary    Left Ventricle: Low normal left ventricular systolic function. EF by 2D Simpsons Biplane is 52%. Left ventricle size is normal. Normal wall thickness. Normal wall motion. Normal diastolic function. Right Ventricle: Right ventricle size is normal. Normal systolic function. Aortic Valve: Tricuspid valve. Mildly thickened cusps. Moderate regurgitation. AV PHT is 384.0 ms. No stenosis. Mitral Valve: Mildly thickened leaflet, at the anterior and posterior leaflets. Moderate to severe regurgitation. Tricuspid Valve: Mild to moderate regurgitation. The estimated RVSP is 42 mmHg. Right Atrium: Right atrium size is normal.  Echodense structure noted in the right atrium may represent indwelling central venous catheter. IVC/SVC: IVC diameter is less than or equal to 21 mm and decreases greater than 50% during inspiration; therefore the estimated right atrial pressure is normal (~3 mmHg). Technical qualifiers: Technically difficult study, color flow Doppler was performed and pulse wave and/or continuous wave Doppler was performed.     Signed by: Leslie Avina DO on 12/10/2022  7:40 PM    Assessment and Plan:  (Medical Decision Making)       Acute on chronic respiratory failure with hypoxia ([facility] [address]  Plan: remains on 2 lpm but she wears anywhere from 1-3 lpm at home. She has a 5 lpm concentrator. States her shortness of breath is no better but has not worsened. COPD exacerbation (HCC)    Dyspnea. Chronic mucus hypersecretion, respiratory  Plan: albuterol, pulmicort, mucinex, spiriva - getting 3 % saline nebs, increase mucinex dose. No further wheezing, will deescalate steroids; sputum cx NGTD       Chest pain  Plan: cardiology primary, off heparin drip, getting plavix, asa, Nitropaste; likely demand eschemia        Non-small cell lung cancer ([facility] [address]  Plan: oncology following o/p, on immunotherapy monthly. Follow up o/p and consider PET scan; has port in right upper chest       Debility    Fatigue    Encounter for palliative care    ACP (advance care planning)  Plan: palliative care seeing patient, she wishes to be full code now. PT seeing and suggests [facility] but patient adamantly wants rehab. She lives with her ex- who works and is not always available to help her. He often works out of town overnight and she would be alone. She verbalized concern that she would not be able to take care of herself. Would recommend some short term rehab. More than 50% of the time documented was spent in face-to-face contact with the patient and in the care of the patient on the floor/unit where the patient is located. In this split/shared evaluation I performed performed a medically appropriate history and exam, counseled and educated the patient and/or family member, documented information in EMR, and coordinated care. which accounted for 12 minutes of clinical time. YISEL Ferrari NP    In this split/shared evaluation I performed reviewed the patients's H&P, available images, labs, cultures. , counseled and educated the patient and/or family member, ordered and/or reviewed medications, tests or procedures, documented information in EMR, independently interpreted images, and coordinated care. which accounted for 18 minutes clinical time. Impression: patient with CAD multiple stents/NCLC stage III (s/p chemo/radiation/immunotherapy), severe COPD, chronic respiratory failure with CHF and COPD exacerbation. Also with elevated TNI  on heparin. Currently down to few liters oxygen, weak at this time and wants rehab. Oncology is worried that has worsening CA and wants pet scan. Not wheezing much and will taper steroids. Also weak and working with PT but wants rehab. She lives alone and Ex- sometimes helps her but not always able to be around and assist with care. She wants to get stronger and will likely benefit from 9th floor rehab, will see if they can evaluate here. Will cut steroids to 40q12. Told her how to use her suction. Will go up on mucinex today. Continue remaining tx.           Domonique Argueta MD

## 2022-12-15 NOTE — PROGRESS NOTES
ACUTE PHYSICAL THERAPY GOALS:   (Developed with and agreed upon by patient and/or caregiver.)  (1.) Marcell Benavidez  will move from supine to sit and sit to supine  with INDEPENDENT within 7 treatment day(s). (2.) Marcell Benavidez will transfer from bed to chair and chair to bed with MODIFIED INDEPENDENCE using the least restrictive device within 7 treatment day(s). (3.) Marcell Benaivdez will ambulate with MODIFIED INDEPENDENCE for 150 feet with the least restrictive device within 7 treatment day(s). (4.) Marcell Benavidez will perform standing static and dynamic balance activities x 15 minutes with MODIFIED INDEPENDENCE to improve safety within 7 treatment day(s). PHYSICAL THERAPY: Daily Note PM   (Link to Caseload Tracking: PT Visit Days : 3  Time In/Out PT Charge Capture  Rehab Caseload Tracker  Orders    Marcell Benavidez is a 67 y.o. female   PRIMARY DIAGNOSIS: Acute on chronic respiratory failure with hypoxia ([address]  Chest pain [R07.9]       Inpatient: Payor: MEDICARE / Plan: MEDICARE PART A AND B / Product Type: *No Product type* /     ASSESSMENT:     REHAB RECOMMENDATIONS:   Recommendation to date pending progress:  Setting:  Home Health Therapy  Potentially STR if capacity remains so limited     Equipment:    To Be Determined     ASSESSMENT:  Ms. Romero Acuna  was seated in recliner on 2L NC upon entering the room and agreeable to therapy. Today, pt demonstrates slow progress toward established goals. This session, pt required CG(A), inc time and cueing for all activity with additional use of RW/ gait belt for amb of 10'x3 (seated rest break between trials). As in yesterday's session, pt requiring multiple standing rest breaks while ambulating d/t fatigue and transient dizziness although she ultimately did well to avoid any LOB/ miss-steps. Gait remains slow and shuffling c notably inc sway. Pt also able to transfer on/ off of BSC with light (A)/ cueing. Some progress although pt cont to be significantly limited at this time with regard to activity tolerance and required seated rest break c max inc time to recover between trials. At this time, pt remains a good candidate for skilled PT and will continue to benefit from advancing POC. At end of session, pt was positioned to comfort in chair with all needs in reach. RN was made aware of pt performance.         SUBJECTIVE:   Ms. Angie Keenan states, \"I still just get tired so quickly\"     Social/Functional Lives With: Spouse  Type of Home: House  Home Layout: One level  Bathroom Shower/Tub: Walk-in shower  Bathroom Equipment: Grab bars in shower, Shower chair  Home Equipment: Thirza Sinning, rolling, Oxygen  Receives Help From: Family  ADL Assistance: Needs assistance  Ambulation Assistance: Needs assistance  Occupation: Retired  OBJECTIVE:     PAIN: VITALS / O2: PRECAUTION / Luis E Ape / [employer]:   Pre Treatment: 0/10         Post Treatment: 0/10 Vitals        Oxygen   2L NC; no drop below 90% Continuous Pulse Oximetry, IV, and Telemetry     RESTRICTIONS/PRECAUTIONS:  Restrictions/Precautions  Restrictions/Precautions: Fall Risk  Restrictions/Precautions: Fall Risk     MOBILITY: I Mod I S SBA CGA Min Mod Max Total  NT x2 Comments:   Bed Mobility    Rolling [] [] [] [] [] [] [] [] [] [x] []    Supine to Sit [] [] [] [] [] [] [] [] [] [x] []    Scooting [] [] [] [] [] [] [] [] [] [x] []    Sit to Supine [] [] [] [] [] [] [] [] [] [x] []    Transfers    Sit to Stand [] [] [] [] [x] [] [] [] [] [] []    Bed to Chair [] [] [] [] [x] [x] [] [] [] [] [] On/off of BSC   Stand to Sit [] [] [] [] [x] [] [] [] [] [] []     [] [] [] [] [] [] [] [] [] [] []    I=Independent, Mod I=Modified Independent, S=Supervision, SBA=Standby Assistance, CGA=Contact Guard Assistance,   Min=Minimal Assistance, Mod=Moderate Assistance, Max=Maximal Assistance, Total=Total Assistance, NT=Not Tested    BALANCE: Good Fair+ Fair Fair- Poor NT Comments   Sitting Static [x] [] [] [] [] []    Sitting Dynamic [] [x] [] [] [] []              Standing Static [] [x] [] [] [] []    Standing Dynamic [] [x] [x] [] [] []      GAIT: I Mod I S SBA CGA Min Mod Max Total  NT x2 Comments:   Level of Assistance [] [] [] [] [x] [] [] [] [] [] []    Distance   10'x3 (extended break to recover b/w trials)    DME Gait Belt and Rolling Walker    Gait Quality Decreased german , Decreased step clearance, Decreased step length, Narrow base of support, Shuffling , and Trunk sway increased    Weightbearing Status      Stairs      I=Independent, Mod I=Modified Independent, S=Supervision, SBA=Standby Assistance, CGA=Contact Guard Assistance,   Min=Minimal Assistance, Mod=Moderate Assistance, Max=Maximal Assistance, Total=Total Assistance, NT=Not Tested    PLAN:   FREQUENCY AND DURATION: 3 times/week for duration of hospital stay or until stated goals are met, whichever comes first.    TREATMENT:   TREATMENT:   Therapeutic Activity (38 Minutes): Therapeutic activity included Scooting, Transfer Training, Ambulation on level ground, Sitting balance , and Standing balance to improve functional Activity tolerance, Balance, Mobility, and Strength.     TREATMENT GRID:  N/A    AFTER TREATMENT PRECAUTIONS: Bed/Chair Locked, Call light within reach, Chair, Needs within reach, and RN notified    INTERDISCIPLINARY COLLABORATION:  RN/ PCT and PT/ PTA    EDUCATION:      TIME IN/OUT:  Time In: 0930  Time Out: [employer]   Minutes: 22 Jamaal Raymond PT

## 2022-12-15 NOTE — PROGRESS NOTES
[facility] CARDIOLOGY PROGRESS NOTE           12/15/2022 1:44 PM    Admit Date: 12/9/2022      Subjective:   No inc sob      Objective:      Vitals:    12/15/22 0710 12/15/22 0811 12/15/22 1149 12/15/22 1220   BP:  138/74 (!) 115/47    Pulse: 72 80 72    Resp: 16 15 17 16   Temp:  97.8 °F (36.6 °C) 98 °F (36.7 °C)    TempSrc:  Oral Oral    SpO2: 100% 96% 95%    Weight:           Physical Exam:  General-No Acute Distress    Data Review:   Recent Labs     12/14/22  0502 12/15/22  0555    135   K 4.3 4.1   BUN 16 19   WBC 9.4 10.4   HGB 8.7* 8.9*   HCT 28.0* 28.3*    263       Assessment/Plan:     Dyspnea  Respiratory failure  Copd  Lung cancer  Cad    ////    As per pulmonary and palliative care    Merary Moore MD  12/15/2022 1:44 PM

## 2022-12-15 NOTE — PROGRESS NOTES
Palliative Care Progress Note    Patient: Farzaneh Rodrigues MRN: [id number]  SSN: [id number]    YOB: 1950  Age: 67 y.o. Sex: female       Assessment/Plan:     Chief Complaint/Interval History: The pt states that her breathing is much better today. Principal Diagnosis:    Dyspnea  R06.00    Additional Diagnoses:   Advance Care Planning Counseling Z71.89  Debility, Unspecified  R53.81  Fatigue, Lethargy  R53.83  Hypersecretions, Respiratory  J39.9  Pain, back  M54.9  Respiratory Failure, Acute on Chronic  J96.20  Encounter for Palliative Care  Z51.5    Palliative Performance Scale (PPS)       Medical Decision Making:   Reviewed and summarized notes from last palliative care visit to present. Discussed case with appropriate providers: [facility]  Reviewed lab and X-ray data from last palliative care visit to present. Pt sitting in easy chair, no visitors present. Ms Regulo Guadarrama endorsed back pain, for which she is finding PT/OT helpful. She endorsed thick secretions which she can cough up but not out. She was very interested to try a [employer]; JIN Sunshine will supply. Ms Regulo Guadarrama said that she would like to re-start her home constipation routine; I've ordered two Colace pills nightly as she requested, and the pt will order the apple juice. Ms Regulo Guadarrama said that she is now has enough breath to get to the bedside commode, and that her breathing is comfortable when she is not attempting to move. Ms Regulo Guadarrama clarified her HCPOA and POST documents, saying that she would want to be intubated if this were called for, and that her /HCPOA would then decided when and if to extubate. Ms Regulo Guadarrama wishes to be full code. Will discuss findings with members of the interdisciplinary team.        More than 50% of this 35 minute visit was spent counseling and coordination of care as outlined above.     Subjective:     Review of Systems:  A comprehensive review of systems was negative except as noted above. Objective:     Visit Vitals  /74   Pulse 80   Temp 97.8 °F (36.6 °C) (Oral)   Resp 15   Wt 131 lb 6.3 oz (59.6 kg)   SpO2 96%   BMI 21.87 kg/m²       Physical Exam:    General:  Cooperative. No acute distress. Eyes:  Conjunctivae/corneas clear    Nose: Nares normal. Septum midline. Neck: Supple, symmetrical, trachea midline, no JVD   Lungs:   Clear to auscultation bilaterally, unlabored   Heart:  Regular rate and rhythm, no murmur    Abdomen:   Soft, non-tender, non-distended   Extremities: Normal, atraumatic, no cyanosis or edema   Skin: Skin color, texture, turgor normal. No rash or lesions.    Neurologic: Nonfocal   Psych: Alert and oriented     Signed By: YISEL Bateman CNP     December 15, 2022

## 2022-12-15 NOTE — CONSULTS
[facility] Consultation    Pt is well known to me from prior 2 prior [facility] stays s/p right femur fx in Feb 2022 and subsequent SDH in march of 2022. She has lung CA (stg 4 NSCLC) and severe COPD. PMH also includes CAD status post PCI, chronic respiratory failure with hypoxia, centrilobular emphysema, hypertension, dependent at 1 L at night, pleurisy, thyroid disease, anxiety, arthritis, GERD, and right carotid stenosis s/p R CEA in 2019. She presented 12/9 to [facility] with c/o CP and SOB. States she has been noted worsening SOB over the past month. Has had to turn her O2 up from 2->3L due to O2 sats of 80. Reports today she started having CPs. Located mid-chest, substernal, radiated around under both breast. She noted recent shingles around her left flank and under her breast. She took SL Ntg with relief for od been \"terrible. \" Reported despite O2 she has been very dyspneic. Reports s/s similar to those with prior CV events but also different. She stated that she finished her chemo/rad therapy 6 weeks ago and is going for immunotherapy. Reports CA downgraded post chemo/rad from stg 4 to stg 3. In the ED: Pt given 324mg ASA, Norco, Morphine, Zofran, NTG paste, IV Heparin bolus +gtt as well as 1600cc NS. She was noted to have dynamic EKG changes and was transferred to [facility] for further Cardiology evaluation. CTA Chest:  1. No evidence for pulmonary embolism, or acute thoracic process otherwise. Only advanced emphysematous changes are seen of the lungs. 2. Multiple suspicious findings in relation to the patient's known neoplasm. There are increasing soft tissue components associated with the left perihilar   lesion and there is an enlarging right hilar lymph node although this is not   clearly enlarged at this time. Disease progression cannot be excluded given   these findings.  The patient should be immediately referred back to her   oncologist or instructed to immediately follow up with her oncologist to assess

## 2022-12-15 NOTE — PLAN OF CARE
Problem: Discharge Planning  Goal: Discharge to home or other facility with appropriate resources  Outcome: Progressing  Flowsheets (Taken 12/15/2022 0815)  Discharge to home or other facility with appropriate resources:   Identify barriers to discharge with patient and caregiver   Arrange for needed discharge resources and transportation as appropriate   Identify discharge learning needs (meds, wound care, etc)   Arrange for interpreters to assist at discharge as needed   Refer to discharge planning if patient needs post-hospital services based on physician order or complex needs related to functional status, cognitive ability or social support system     Problem: Safety - Adult  Goal: Free from fall injury  Outcome: Progressing  Flowsheets (Taken 12/15/2022 0815)  Free From Fall Injury: Instruct family/caregiver on patient safety     Problem: Pain  Goal: Verbalizes/displays adequate comfort level or baseline comfort level  Outcome: Progressing     Problem: Skin/Tissue Integrity  Goal: Absence of new skin breakdown  Description: 1. Monitor for areas of redness and/or skin breakdown  2. Assess vascular access sites hourly  3. Every 4-6 hours minimum:  Change oxygen saturation probe site  4. Every 4-6 hours:  If on nasal continuous positive airway pressure, respiratory therapy assess nares and determine need for appliance change or resting period.   Outcome: Progressing     Problem: ABCDS Injury Assessment  Goal: Absence of physical injury  Outcome: Progressing  Flowsheets (Taken 12/15/2022 0815)  Absence of Physical Injury: Implement safety measures based on patient assessment

## 2022-12-16 LAB
ANION GAP SERPL CALC-SCNC: 4 MMOL/L (ref 2–11)
BACTERIA SPEC CULT: NORMAL
BASOPHILS # BLD: 0 K/UL (ref 0–0.2)
BASOPHILS NFR BLD: 0 % (ref 0–2)
BUN SERPL-MCNC: 19 MG/DL (ref 8–23)
CALCIUM SERPL-MCNC: 8.6 MG/DL (ref 8.3–10.4)
CHLORIDE SERPL-SCNC: 101 MMOL/L (ref 101–110)
CO2 SERPL-SCNC: 31 MMOL/L (ref 21–32)
CREAT SERPL-MCNC: 0.6 MG/DL (ref 0.6–1)
DIFFERENTIAL METHOD BLD: ABNORMAL
EOSINOPHIL # BLD: 0 K/UL (ref 0–0.8)
EOSINOPHIL NFR BLD: 0 % (ref 0.5–7.8)
ERYTHROCYTE [DISTWIDTH] IN BLOOD BY AUTOMATED COUNT: 18 % (ref 11.9–14.6)
GLUCOSE SERPL-MCNC: 144 MG/DL (ref 65–100)
GRAM STN SPEC: NORMAL
HCT VFR BLD AUTO: 28.4 % (ref 35.8–46.3)
HGB BLD-MCNC: 8.8 G/DL (ref 11.7–15.4)
IMM GRANULOCYTES # BLD AUTO: 0.1 K/UL (ref 0–0.5)
IMM GRANULOCYTES NFR BLD AUTO: 1 % (ref 0–5)
LYMPHOCYTES # BLD: 0.4 K/UL (ref 0.5–4.6)
LYMPHOCYTES NFR BLD: 3 % (ref 13–44)
MCH RBC QN AUTO: 31.5 PG (ref 26.1–32.9)
MCHC RBC AUTO-ENTMCNC: 31 G/DL (ref 31.4–35)
MCV RBC AUTO: 101.8 FL (ref 82–102)
MONOCYTES # BLD: 0.7 K/UL (ref 0.1–1.3)
MONOCYTES NFR BLD: 6 % (ref 4–12)
NEUTS SEG # BLD: 10.5 K/UL (ref 1.7–8.2)
NEUTS SEG NFR BLD: 90 % (ref 43–78)
NRBC # BLD: 0 K/UL (ref 0–0.2)
PLATELET # BLD AUTO: 248 K/UL (ref 150–450)
PMV BLD AUTO: 9.7 FL (ref 9.4–12.3)
POTASSIUM SERPL-SCNC: 4.3 MMOL/L (ref 3.5–5.1)
RBC # BLD AUTO: 2.79 M/UL (ref 4.05–5.2)
SERVICE CMNT-IMP: NORMAL
SODIUM SERPL-SCNC: 136 MMOL/L (ref 133–143)
WBC # BLD AUTO: 11.7 K/UL (ref 4.3–11.1)

## 2022-12-16 PROCEDURE — 6370000000 HC RX 637 (ALT 250 FOR IP): Performed by: NURSE PRACTITIONER

## 2022-12-16 PROCEDURE — 6370000000 HC RX 637 (ALT 250 FOR IP): Performed by: STUDENT IN AN ORGANIZED HEALTH CARE EDUCATION/TRAINING PROGRAM

## 2022-12-16 PROCEDURE — 99232 SBSQ HOSP IP/OBS MODERATE 35: CPT | Performed by: INTERNAL MEDICINE

## 2022-12-16 PROCEDURE — 97530 THERAPEUTIC ACTIVITIES: CPT

## 2022-12-16 PROCEDURE — 2700000000 HC OXYGEN THERAPY PER DAY

## 2022-12-16 PROCEDURE — 6360000002 HC RX W HCPCS: Performed by: INTERNAL MEDICINE

## 2022-12-16 PROCEDURE — 94761 N-INVAS EAR/PLS OXIMETRY MLT: CPT

## 2022-12-16 PROCEDURE — 2580000003 HC RX 258: Performed by: NURSE PRACTITIONER

## 2022-12-16 PROCEDURE — 2500000003 HC RX 250 WO HCPCS: Performed by: NURSE PRACTITIONER

## 2022-12-16 PROCEDURE — 6370000000 HC RX 637 (ALT 250 FOR IP): Performed by: INTERNAL MEDICINE

## 2022-12-16 PROCEDURE — 2580000003 HC RX 258: Performed by: INTERNAL MEDICINE

## 2022-12-16 PROCEDURE — 1100000003 HC PRIVATE W/ TELEMETRY

## 2022-12-16 PROCEDURE — 94640 AIRWAY INHALATION TREATMENT: CPT

## 2022-12-16 PROCEDURE — 80048 BASIC METABOLIC PNL TOTAL CA: CPT

## 2022-12-16 PROCEDURE — 85025 COMPLETE CBC W/AUTO DIFF WBC: CPT

## 2022-12-16 RX ORDER — ALBUTEROL SULFATE 2.5 MG/3ML
2.5 SOLUTION RESPIRATORY (INHALATION) EVERY 4 HOURS PRN
Status: DISCONTINUED | OUTPATIENT
Start: 2022-12-16 | End: 2022-12-21 | Stop reason: HOSPADM

## 2022-12-16 RX ORDER — PREDNISONE 20 MG/1
20 TABLET ORAL 2 TIMES DAILY
Status: DISCONTINUED | OUTPATIENT
Start: 2022-12-16 | End: 2022-12-21 | Stop reason: HOSPADM

## 2022-12-16 RX ORDER — LIDOCAINE AND PRILOCAINE 25; 25 MG/G; MG/G
CREAM TOPICAL ONCE
Status: DISCONTINUED | OUTPATIENT
Start: 2022-12-16 | End: 2022-12-16 | Stop reason: SDUPTHER

## 2022-12-16 RX ORDER — LIDOCAINE 40 MG/G
CREAM TOPICAL ONCE
Status: COMPLETED | OUTPATIENT
Start: 2022-12-16 | End: 2022-12-16

## 2022-12-16 RX ADMIN — ACYCLOVIR 800 MG: 800 TABLET ORAL at 08:25

## 2022-12-16 RX ADMIN — Medication 5 UNITS: at 16:02

## 2022-12-16 RX ADMIN — GUAIFENESIN 1200 MG: 600 TABLET ORAL at 20:38

## 2022-12-16 RX ADMIN — GABAPENTIN 300 MG: 300 CAPSULE ORAL at 08:25

## 2022-12-16 RX ADMIN — TIOTROPIUM BROMIDE INHALATION SPRAY 2 PUFF: 3.12 SPRAY, METERED RESPIRATORY (INHALATION) at 09:27

## 2022-12-16 RX ADMIN — ALBUTEROL SULFATE 2.5 MG: 2.5 SOLUTION RESPIRATORY (INHALATION) at 09:27

## 2022-12-16 RX ADMIN — ATORVASTATIN CALCIUM 80 MG: 80 TABLET, FILM COATED ORAL at 20:38

## 2022-12-16 RX ADMIN — METHYLPREDNISOLONE SODIUM SUCCINATE 40 MG: 40 INJECTION, POWDER, FOR SOLUTION INTRAMUSCULAR; INTRAVENOUS at 08:24

## 2022-12-16 RX ADMIN — GABAPENTIN 300 MG: 300 CAPSULE ORAL at 13:48

## 2022-12-16 RX ADMIN — METOPROLOL SUCCINATE 100 MG: 100 TABLET, EXTENDED RELEASE ORAL at 08:25

## 2022-12-16 RX ADMIN — EZETIMIBE 10 MG: 10 TABLET ORAL at 20:38

## 2022-12-16 RX ADMIN — DOXYCYCLINE HYCLATE 100 MG: 100 CAPSULE ORAL at 08:24

## 2022-12-16 RX ADMIN — BUDESONIDE 500 MCG: 0.5 INHALANT RESPIRATORY (INHALATION) at 21:08

## 2022-12-16 RX ADMIN — ASPIRIN 81 MG: 81 TABLET, CHEWABLE ORAL at 08:25

## 2022-12-16 RX ADMIN — Medication 4 ML: at 09:27

## 2022-12-16 RX ADMIN — DOCUSATE SODIUM 200 MG: 100 CAPSULE, LIQUID FILLED ORAL at 20:39

## 2022-12-16 RX ADMIN — BUDESONIDE 500 MCG: 0.5 INHALANT RESPIRATORY (INHALATION) at 09:27

## 2022-12-16 RX ADMIN — LOSARTAN POTASSIUM 25 MG: 25 TABLET, FILM COATED ORAL at 08:25

## 2022-12-16 RX ADMIN — GABAPENTIN 300 MG: 300 CAPSULE ORAL at 20:38

## 2022-12-16 RX ADMIN — TRAMADOL HYDROCHLORIDE 100 MG: 50 TABLET ORAL at 20:38

## 2022-12-16 RX ADMIN — TRAMADOL HYDROCHLORIDE 100 MG: 50 TABLET ORAL at 08:25

## 2022-12-16 RX ADMIN — ALBUTEROL SULFATE 2.5 MG: 2.5 SOLUTION RESPIRATORY (INHALATION) at 21:08

## 2022-12-16 RX ADMIN — ESCITALOPRAM OXALATE 10 MG: 10 TABLET ORAL at 08:25

## 2022-12-16 RX ADMIN — DOXYCYCLINE HYCLATE 100 MG: 100 CAPSULE ORAL at 20:38

## 2022-12-16 RX ADMIN — Medication 4 ML: at 21:08

## 2022-12-16 RX ADMIN — LIDOCAINE: 40 CREAM TOPICAL at 11:26

## 2022-12-16 RX ADMIN — SODIUM CHLORIDE, PRESERVATIVE FREE 10 ML: 5 INJECTION INTRAVENOUS at 08:27

## 2022-12-16 RX ADMIN — ALBUTEROL SULFATE 2.5 MG: 2.5 SOLUTION RESPIRATORY (INHALATION) at 15:00

## 2022-12-16 RX ADMIN — PANTOPRAZOLE SODIUM 40 MG: 40 TABLET, DELAYED RELEASE ORAL at 20:38

## 2022-12-16 RX ADMIN — GUAIFENESIN 1200 MG: 600 TABLET ORAL at 08:25

## 2022-12-16 RX ADMIN — ACYCLOVIR 800 MG: 800 TABLET ORAL at 20:38

## 2022-12-16 RX ADMIN — METHIMAZOLE 5 MG: 5 TABLET ORAL at 08:25

## 2022-12-16 RX ADMIN — PANTOPRAZOLE SODIUM 40 MG: 40 TABLET, DELAYED RELEASE ORAL at 08:25

## 2022-12-16 RX ADMIN — CLOPIDOGREL BISULFATE 75 MG: 75 TABLET ORAL at 08:25

## 2022-12-16 RX ADMIN — TRAMADOL HYDROCHLORIDE 100 MG: 50 TABLET ORAL at 11:24

## 2022-12-16 RX ADMIN — TRAMADOL HYDROCHLORIDE 100 MG: 50 TABLET ORAL at 15:54

## 2022-12-16 RX ADMIN — SODIUM CHLORIDE, PRESERVATIVE FREE 10 ML: 5 INJECTION INTRAVENOUS at 20:42

## 2022-12-16 RX ADMIN — PREDNISONE 20 MG: 20 TABLET ORAL at 20:38

## 2022-12-16 ASSESSMENT — PAIN DESCRIPTION - ORIENTATION
ORIENTATION: MID;POSTERIOR
ORIENTATION: MID;ANTERIOR;POSTERIOR
ORIENTATION: MID;POSTERIOR

## 2022-12-16 ASSESSMENT — PAIN DESCRIPTION - PAIN TYPE
TYPE: CHRONIC PAIN

## 2022-12-16 ASSESSMENT — PAIN DESCRIPTION - FREQUENCY
FREQUENCY: CONTINUOUS
FREQUENCY: CONTINUOUS
FREQUENCY: INTERMITTENT

## 2022-12-16 ASSESSMENT — PAIN DESCRIPTION - DESCRIPTORS
DESCRIPTORS: ACHING;SORE

## 2022-12-16 ASSESSMENT — PAIN SCALES - GENERAL
PAINLEVEL_OUTOF10: 2
PAINLEVEL_OUTOF10: 3
PAINLEVEL_OUTOF10: 5
PAINLEVEL_OUTOF10: 4
PAINLEVEL_OUTOF10: 3
PAINLEVEL_OUTOF10: 0
PAINLEVEL_OUTOF10: 5

## 2022-12-16 ASSESSMENT — PAIN DESCRIPTION - ONSET
ONSET: ON-GOING

## 2022-12-16 ASSESSMENT — PAIN - FUNCTIONAL ASSESSMENT
PAIN_FUNCTIONAL_ASSESSMENT: ACTIVITIES ARE NOT PREVENTED
PAIN_FUNCTIONAL_ASSESSMENT: ACTIVITIES ARE NOT PREVENTED

## 2022-12-16 ASSESSMENT — PAIN DESCRIPTION - LOCATION
LOCATION: CHEST;BACK
LOCATION: BUTTOCKS;BACK
LOCATION: CHEST;BACK

## 2022-12-16 NOTE — PROGRESS NOTES
OT Note:    OT attempted to see patient for therapy today. Pt was receiving breathing treatment at this time. OT will re-attempt to see patient at a later date/time.     Thanks,  JULIÁN Mazariegos

## 2022-12-16 NOTE — PROGRESS NOTES
Carl Ch[address]vez 62 [address]  Admission Date: 12/9/2022         Daily Progress Note: 12/16/2022    The patient's chart is reviewed and the patient is discussed with the staff. Background: 76yo WF with h/o CAD s/p 15 stents, HTN, HLD, nsclc stage III (completed chemo/radiation, on immunotherapy), very severe COPD (evaluated and turned down for transplant x 2) with chronic hypoxic respiratory failure on 2L O2 continuously, and anxiety/depression, presented to ER with chest pain and shortness of breath on 12/9. Took nitro at home with relief of chest pain. She was found to have elevated BNP and troponin in the ER. She was admitted by cardiology and started on heparin gtt. We were consulted secondary to wheezing. Subjective:   Down to 1 LPM NC. Evaluated for [facility] yesterday, but not deemed a candidate at this time. Recommended STR in a skilled setting. Pt is sitting up in recliner eating lunch. Reports intermittent dizziness and lightheadedness when she stands up. No vertigo. Reports feeling generalized weakness. O2 96%.        Current Facility-Administered Medications   Medication Dose Route Frequency    albuterol (PROVENTIL) nebulizer solution 2.5 mg  2.5 mg Nebulization Q4H PRN    docusate sodium (COLACE) capsule 200 mg  200 mg Oral Nightly    guaiFENesin (MUCINEX) extended release tablet 1,200 mg  1,200 mg Oral BID    methylPREDNISolone sodium (SOLU-MEDROL) injection 40 mg  40 mg IntraVENous Q12H    losartan (COZAAR) tablet 25 mg  25 mg Oral Daily    doxycycline hyclate (VIBRAMYCIN) capsule 100 mg  100 mg Oral 2 times per day    medicated lip ointment (BLISTEX)   Topical PRN    guaiFENesin-dextromethorphan (ROBITUSSIN DM) 100-10 MG/5ML syrup 5 mL  5 mL Oral Q4H PRN    budesonide (PULMICORT) nebulizer suspension 500 mcg  0.5 mg Nebulization BID    sodium chloride (Inhalant) 3 % nebulizer solution 4 mL  4 mL Nebulization BID    albuterol (PROVENTIL) nebulizer solution 2.5 mg  2.5 mg Nebulization ondansetron (ZOFRAN) injection 4 mg  4 mg IntraVENous Q6H PRN    aluminum & magnesium hydroxide-simethicone (MAALOX) 200-200-20 MG/5ML suspension 30 mL  30 mL Oral Q6H PRN    nitroGLYCERIN (NITROSTAT) SL tablet 0.4 mg  0.4 mg SubLINGual Q5 Min PRN    0.9 % sodium chloride infusion   IntraVENous Continuous    [Held by provider] azithromycin (ZITHROMAX) tablet 500 mg  500 mg Oral Once per day on Mon Wed Fri    heparin (porcine) injection 3,540 Units  60 Units/kg IntraVENous PRN    heparin (porcine) injection 1,770 Units  30 Units/kg IntraVENous PRN    heparin 25,000 units in dextrose 5% 250 mL (premix) infusion  5-30 Units/kg/hr IntraVENous Continuous    LORazepam (ATIVAN) injection 1 mg  1 mg IntraVENous Q6H PRN     Review of Systems: Comprehensive ROS negative except in HPI  Objective:   Blood pressure 131/65, pulse 73, temperature 97.7 °F (36.5 °C), temperature source Oral, resp. rate 17, weight 126 lb 8.7 oz (57.4 kg), SpO2 97 %. Intake/Output Summary (Last 24 hours) at 12/16/2022 1152  Last data filed at 12/16/2022 0745  Gross per 24 hour   Intake 715 ml   Output 700 ml   Net 15 ml       Physical Exam:   Constitutional:  the patient is thin, ill appearing and in no acute distress  EENMT:  Sclera clear, pupils equal, oral mucosa moist  Respiratory: symmetric chest rise. Clear but distant throughout, on 1 lpm NC  Cardiovascular:  RRR without M,G,R. There is no lower extremity edema. Gastrointestinal: soft and non-tender; with positive bowel sounds. Musculoskeletal: warm without cyanosis. Normal muscle tone. Skin:  no jaundice or rashes, no wounds   Neurologic: symmetric strength, fluent speech  Psychiatric:  calm, appropriate, oriented x 4    Imaging: I performed an independent interpretation of the patient's images.   CXR:   12/10                                                                                    12/9      CT chest 12/9    LAB:  Recent Labs     12/14/22  0502 12/15/22  0555 12/16/22  1836 WBC 9.4 10.4 11.7*   HGB 8.7* 8.9* 8.8*   HCT 28.0* 28.3* 28.4*    263 248       Recent Labs     12/14/22  0502 12/15/22  0555 12/16/22  0521    135 136   K 4.3 4.1 4.3   * 98* 101   CO2 32 31 31   BUN 16 19 19   CREATININE 0.70 0.60 0.60       No results for input(s): TROPHS, NTPROBNP, CRP, ESR in the last 72 hours. Recent Labs     12/14/22  0502 12/15/22  0555 12/16/22  0521   GLUCOSE 140* 146* 144*        Microbiology:   Recent Labs     12/13/22 1942   CULTURE HEAVY NORMAL RESPIRATORY JAY       ECHO: 12/09/22    TRANSTHORACIC ECHOCARDIOGRAM (TTE) COMPLETE (CONTRAST/BUBBLE/3D PRN) 12/10/2022  7:40 PM (Final)    Interpretation Summary    Left Ventricle: Low normal left ventricular systolic function. EF by 2D Simpsons Biplane is 52%. Left ventricle size is normal. Normal wall thickness. Normal wall motion. Normal diastolic function. Right Ventricle: Right ventricle size is normal. Normal systolic function. Aortic Valve: Tricuspid valve. Mildly thickened cusps. Moderate regurgitation. AV PHT is 384.0 ms. No stenosis. Mitral Valve: Mildly thickened leaflet, at the anterior and posterior leaflets. Moderate to severe regurgitation. Tricuspid Valve: Mild to moderate regurgitation. The estimated RVSP is 42 mmHg. Right Atrium: Right atrium size is normal.  Echodense structure noted in the right atrium may represent indwelling central venous catheter. IVC/SVC: IVC diameter is less than or equal to 21 mm and decreases greater than 50% during inspiration; therefore the estimated right atrial pressure is normal (~3 mmHg). Technical qualifiers: Technically difficult study, color flow Doppler was performed and pulse wave and/or continuous wave Doppler was performed.     Signed by: Alex Terrell DO on 12/10/2022  7:40 PM    Assessment and Plan:  (Medical Decision Making)       Acute on chronic respiratory failure with hypoxia ([address]  Plan: down to 1 LPM NC at present,  but she wears anywhere from 1-3 lpm at home. She has a 5 lpm concentrator. States her shortness of breath is no better but has not worsened. COPD exacerbation (HCC)    Dyspnea. Chronic mucus hypersecretion, respiratory  Plan: albuterol, pulmicort, mucinex, spiriva - getting 3 % saline nebs, cont mucinex (was increased yesterday). No further wheezing, will deescalate steroids, discontinue solumedrol and transition to oral prednisone 20 mg BID for now; sputum cx NGTD       Chest pain  Plan: cardiology primary, off heparin drip, getting plavix, asa, Nitropaste; likely demand eschemia. Cardiology is stopping monitoring and transferring to medical bed. Non-small cell lung cancer [facility])  Plan: oncology following o/p, on immunotherapy monthly. Follow up o/p and consider PET scan; has port in right upper chest       Debility    Fatigue    Encounter for palliative care    ACP (advance care planning)  Plan: palliative care seeing patient, she wishes to be full code now. PT seeing and suggests [place] but patient adamantly wants rehab. IRC evaluated yesterday, but was recommended for STR given poor activity intolerance IRC would not be appropriate. She lives with her ex- who works and is not always available to help her. He often works out of town overnight and she would be alone. She verbalized concern that she would not be able to take care of herself. Nothing further to add at this time, pt can follow up outpt with pulmonary once home for monitoring. More than 50% of the time documented was spent in face-to-face contact with the patient and in the care of the patient on the floor/unit where the patient is located. In this split/shared evaluation I performed performed a medically appropriate history and exam, counseled and educated the patient and/or family member, documented information in EMR, and coordinated care. which accounted for 12 minutes of clinical time.      YISEL Miller - CNP  In this split/shared evaluation I performed reviewed the patients's H&P, available images, labs, cultures. , discussed case in detail with NPP, performed a medically appropriate history and exam, counseled and educated the patient and/or family member, ordered and/or reviewed medications, tests or procedures, documented information in EMR, independently interpreted images, and coordinated care. which accounted for 14  minutes clinical time. Impression:   Acute on chronic respiratory failure with hypoxia ([facility] [address]  Plan: remains on 2 lpm but she wears anywhere from 1-3 lpm at home. She has a 5 lpm concentrator. States her shortness of breath is no better but has not worsened. COPD exacerbation (HCC)  Lungs are clear    Dyspnea. Chronic mucus hypersecretion, respiratory  Plan: albuterol, pulmicort, mucinex, spiriva - getting 3 % saline nebs, increase mucinex dose. No further wheezing, will deescalate steroids; sputum cx NGTD        Chest pain  Plan: cardiology primary, off heparin drip, getting plavix, asa, Nitropaste; likely demand eschemia        Non-small cell lung cancer ([facility] [address]  Plan: oncology following o/p, on immunotherapy monthly. Follow up o/p and consider PET scan; has port in right upper chest        Debility    Fatigue    Encounter for palliative care    ACP (advance care planning)  Plan: palliative care seeing patient, she wishes to be full code now.    Nothing to add- will sign off    Briana Neal MD

## 2022-12-16 NOTE — PROGRESS NOTES
PT recommending STR. Provided pt with STR list, however, she asked that this writer e-mail it to her and she will send it to her dtr to help her make a decision on the facilities. Sent list to: [email or web address]. CM explained that we can not make recommendations due to conflict of interest and directed her to the web site on the  rehab list. Requested 3-4 facilities and explained the referral and transfer process to the pt. Will f/u with pt tomorrow, pt aware.

## 2022-12-16 NOTE — PROGRESS NOTES
ACUTE PHYSICAL THERAPY GOALS:   (Developed with and agreed upon by patient and/or caregiver.)  (1.) Buena Lesches  will move from supine to sit and sit to supine  with INDEPENDENT within 7 treatment day(s). (2.) Buena Lesches will transfer from bed to chair and chair to bed with MODIFIED INDEPENDENCE using the least restrictive device within 7 treatment day(s). (3.) Buena Lesches will ambulate with MODIFIED INDEPENDENCE for 150 feet with the least restrictive device within 7 treatment day(s). (4.) Buena Lesches will perform standing static and dynamic balance activities x 15 minutes with MODIFIED INDEPENDENCE to improve safety within 7 treatment day(s). PHYSICAL THERAPY: Daily Note PM   (Link to Caseload Tracking: PT Visit Days : 3  Time In/Out PT Charge Capture  Rehab Caseload Tracker  Orders    Buena Lesches is a 67 y.o. female   PRIMARY DIAGNOSIS: Acute on chronic respiratory failure with hypoxia (HCC)  Chest pain [R07.9]       Inpatient: Payor: MEDICARE / Plan: MEDICARE PART A AND B / Product Type: *No Product type* /     ASSESSMENT:     REHAB RECOMMENDATIONS:   Recommendation to date pending progress:  Setting:  Short-term Rehab      Equipment:    To Be Determined     ASSESSMENT:  Ms. Sanaz Chavez  was seated in recliner on 1L NC upon entering the room and agreeable to therapy. Today, pt demonstrates noteworthy regression from sessions past. This session, pt required Min(A) to stand from chair and she was much more limited in her ability to tolerate activity which previously had been progressing. Today, pt only able to tolerate a total amb of 15'x1 however she required 3 seated rest breaks in order to complete the total distance. Pt requesting breaks d/t lightheadedness and generalized fatigue while moving; BP was taken in sitting and was 128/59.  At one point, pt became tearful and overwhelmed stating \"I don't want to to live like this\" at [] []    Bed to Chair [] [] [] [] [] [] [] [] [] [x] []    Stand to Sit [] [] [] [] [] [x] [] [] [] [] []     [] [] [] [] [] [] [] [] [] [] []    I=Independent, Mod I=Modified Independent, S=Supervision, SBA=Standby Assistance, CGA=Contact Guard Assistance,   Min=Minimal Assistance, Mod=Moderate Assistance, Max=Maximal Assistance, Total=Total Assistance, NT=Not Tested    BALANCE: Good Fair+ Fair Fair- Poor NT Comments   Sitting Static [x] [] [] [] [] []    Sitting Dynamic [] [x] [] [] [] []              Standing Static [] [x] [] [] [] []    Standing Dynamic [] [x] [x] [] [] []      GAIT: I Mod I S SBA CGA Min Mod Max Total  NT x2 Comments:   Level of Assistance [] [] [] [] [x] [] [] [] [] [] []    Distance   15' total (3 seated breaks)    DME Gait Belt and Rolling Walker    Gait Quality Decreased german , Decreased step clearance, Decreased step length, Narrow base of support, Shuffling , and Trunk sway increased    Weightbearing Status      Stairs      I=Independent, Mod I=Modified Independent, S=Supervision, SBA=Standby Assistance, CGA=Contact Guard Assistance,   Min=Minimal Assistance, Mod=Moderate Assistance, Max=Maximal Assistance, Total=Total Assistance, NT=Not Tested    PLAN:   FREQUENCY AND DURATION: 3 times/week for duration of hospital stay or until stated goals are met, whichever comes first.    TREATMENT:   TREATMENT:   Therapeutic Activity (23 Minutes): Therapeutic activity included Scooting, Transfer Training, Ambulation on level ground, Sitting balance , and Standing balance to improve functional Activity tolerance, Balance, Mobility, and Strength.     TREATMENT GRID:  N/A    AFTER TREATMENT PRECAUTIONS: Bed/Chair Locked, Call light within reach, Chair, Needs within reach, and RN notified    INTERDISCIPLINARY COLLABORATION:  RN/ PCT and PT/ PTA    EDUCATION:      TIME IN/OUT:  Time In: 1055  Time Out: 6 [facility]  Minutes: 23    Radha Burns PT

## 2022-12-16 NOTE — PROGRESS NOTES
[facility] CARDIOLOGY PROGRESS NOTE           12/16/2022 8:05 AM    Admit Date: 12/9/2022      Subjective:   No cp  Wants rehab    Objective:      Vitals:    12/15/22 2254 12/15/22 2340 12/16/22 0435 12/16/22 0745   BP: (!) 124/55 (!) 137/56 129/71    Pulse: 70 77     Resp:  18 16    Temp:  97.5 °F (36.4 °C) 97.7 °F (36.5 °C)    TempSrc:  Oral Oral    SpO2: 96% 96% 95% 94%   Weight:   126 lb 8.7 oz (57.4 kg)        Physical Exam:  General-No Acute Distress  Neck- supple, no JVD  CV- regular rate and rhythm no MRG  Lung- clear bilaterally  Abd- soft, nontender, nondistended  Ext- no edema bilaterally.   Skin- warm and dry    Data Review:   Recent Labs     12/15/22  0555 12/16/22  0521    136   K 4.1 4.3   BUN 19 19   WBC 10.4 11.7*   HGB 8.9* 8.8*   HCT 28.3* 28.4*    248       Assessment/Plan:     Chest pain, resolved  Copd  Lung cancer    ////    Stop monitor  Transfer to medical floor bed    Jeffrey Calloway MD  12/16/2022 8:05 AM

## 2022-12-17 LAB
MM INDURATION, POC: 0 MM (ref 0–5)
PPD, POC: NEGATIVE

## 2022-12-17 PROCEDURE — 6370000000 HC RX 637 (ALT 250 FOR IP): Performed by: NURSE PRACTITIONER

## 2022-12-17 PROCEDURE — 6370000000 HC RX 637 (ALT 250 FOR IP): Performed by: INTERNAL MEDICINE

## 2022-12-17 PROCEDURE — 2580000003 HC RX 258: Performed by: NURSE PRACTITIONER

## 2022-12-17 PROCEDURE — 6360000002 HC RX W HCPCS: Performed by: INTERNAL MEDICINE

## 2022-12-17 PROCEDURE — 2580000003 HC RX 258: Performed by: INTERNAL MEDICINE

## 2022-12-17 PROCEDURE — 99233 SBSQ HOSP IP/OBS HIGH 50: CPT | Performed by: INTERNAL MEDICINE

## 2022-12-17 PROCEDURE — 6370000000 HC RX 637 (ALT 250 FOR IP): Performed by: STUDENT IN AN ORGANIZED HEALTH CARE EDUCATION/TRAINING PROGRAM

## 2022-12-17 PROCEDURE — 1100000000 HC RM PRIVATE

## 2022-12-17 PROCEDURE — 94640 AIRWAY INHALATION TREATMENT: CPT

## 2022-12-17 PROCEDURE — 94760 N-INVAS EAR/PLS OXIMETRY 1: CPT

## 2022-12-17 PROCEDURE — 2700000000 HC OXYGEN THERAPY PER DAY

## 2022-12-17 RX ADMIN — ESCITALOPRAM OXALATE 10 MG: 10 TABLET ORAL at 09:27

## 2022-12-17 RX ADMIN — PREDNISONE 20 MG: 20 TABLET ORAL at 09:26

## 2022-12-17 RX ADMIN — GABAPENTIN 300 MG: 300 CAPSULE ORAL at 21:18

## 2022-12-17 RX ADMIN — TRAMADOL HYDROCHLORIDE 100 MG: 50 TABLET ORAL at 09:27

## 2022-12-17 RX ADMIN — Medication 4 ML: at 20:53

## 2022-12-17 RX ADMIN — ALBUTEROL SULFATE 2.5 MG: 2.5 SOLUTION RESPIRATORY (INHALATION) at 20:53

## 2022-12-17 RX ADMIN — ALBUTEROL SULFATE 2.5 MG: 2.5 SOLUTION RESPIRATORY (INHALATION) at 07:32

## 2022-12-17 RX ADMIN — LOSARTAN POTASSIUM 25 MG: 25 TABLET, FILM COATED ORAL at 09:27

## 2022-12-17 RX ADMIN — TRAMADOL HYDROCHLORIDE 100 MG: 50 TABLET ORAL at 21:18

## 2022-12-17 RX ADMIN — TRAMADOL HYDROCHLORIDE 100 MG: 50 TABLET ORAL at 12:22

## 2022-12-17 RX ADMIN — GUAIFENESIN 1200 MG: 600 TABLET ORAL at 09:28

## 2022-12-17 RX ADMIN — TIOTROPIUM BROMIDE INHALATION SPRAY 2 PUFF: 3.12 SPRAY, METERED RESPIRATORY (INHALATION) at 07:32

## 2022-12-17 RX ADMIN — ACYCLOVIR 800 MG: 800 TABLET ORAL at 09:27

## 2022-12-17 RX ADMIN — DOXYCYCLINE HYCLATE 100 MG: 100 CAPSULE ORAL at 09:27

## 2022-12-17 RX ADMIN — GUAIFENESIN 1200 MG: 600 TABLET ORAL at 21:18

## 2022-12-17 RX ADMIN — GABAPENTIN 300 MG: 300 CAPSULE ORAL at 15:10

## 2022-12-17 RX ADMIN — PREDNISONE 20 MG: 20 TABLET ORAL at 21:18

## 2022-12-17 RX ADMIN — SODIUM CHLORIDE, PRESERVATIVE FREE 10 ML: 5 INJECTION INTRAVENOUS at 21:18

## 2022-12-17 RX ADMIN — PANTOPRAZOLE SODIUM 40 MG: 40 TABLET, DELAYED RELEASE ORAL at 09:27

## 2022-12-17 RX ADMIN — METOPROLOL SUCCINATE 100 MG: 100 TABLET, EXTENDED RELEASE ORAL at 09:27

## 2022-12-17 RX ADMIN — BUDESONIDE 500 MCG: 0.5 INHALANT RESPIRATORY (INHALATION) at 20:53

## 2022-12-17 RX ADMIN — CLOPIDOGREL BISULFATE 75 MG: 75 TABLET ORAL at 09:27

## 2022-12-17 RX ADMIN — ALBUTEROL SULFATE 2.5 MG: 2.5 SOLUTION RESPIRATORY (INHALATION) at 14:58

## 2022-12-17 RX ADMIN — ASPIRIN 81 MG: 81 TABLET, CHEWABLE ORAL at 09:27

## 2022-12-17 RX ADMIN — EZETIMIBE 10 MG: 10 TABLET ORAL at 21:18

## 2022-12-17 RX ADMIN — METHIMAZOLE 5 MG: 5 TABLET ORAL at 09:26

## 2022-12-17 RX ADMIN — TRAMADOL HYDROCHLORIDE 100 MG: 50 TABLET ORAL at 17:01

## 2022-12-17 RX ADMIN — DOCUSATE SODIUM 200 MG: 100 CAPSULE, LIQUID FILLED ORAL at 21:18

## 2022-12-17 RX ADMIN — ATORVASTATIN CALCIUM 80 MG: 80 TABLET, FILM COATED ORAL at 21:18

## 2022-12-17 RX ADMIN — SODIUM CHLORIDE, PRESERVATIVE FREE 10 ML: 5 INJECTION INTRAVENOUS at 09:29

## 2022-12-17 RX ADMIN — Medication 4 ML: at 07:32

## 2022-12-17 RX ADMIN — BUDESONIDE 500 MCG: 0.5 INHALANT RESPIRATORY (INHALATION) at 07:32

## 2022-12-17 RX ADMIN — ACYCLOVIR 800 MG: 800 TABLET ORAL at 21:18

## 2022-12-17 RX ADMIN — PANTOPRAZOLE SODIUM 40 MG: 40 TABLET, DELAYED RELEASE ORAL at 21:18

## 2022-12-17 RX ADMIN — GABAPENTIN 300 MG: 300 CAPSULE ORAL at 09:26

## 2022-12-17 RX ADMIN — DOXYCYCLINE HYCLATE 100 MG: 100 CAPSULE ORAL at 21:18

## 2022-12-17 ASSESSMENT — PAIN SCALES - GENERAL
PAINLEVEL_OUTOF10: 0
PAINLEVEL_OUTOF10: 6
PAINLEVEL_OUTOF10: 0

## 2022-12-17 ASSESSMENT — PAIN DESCRIPTION - DESCRIPTORS: DESCRIPTORS: ACHING

## 2022-12-17 ASSESSMENT — PAIN DESCRIPTION - LOCATION: LOCATION: BACK

## 2022-12-17 ASSESSMENT — PAIN DESCRIPTION - ORIENTATION: ORIENTATION: MID

## 2022-12-17 NOTE — PROGRESS NOTES
Reviewed notes for new spiritual concerns. LOCAL    EX SPOUSE -  JNOATHAN    FULL CODE    HAS DIRECTIVES    MY CHART - ACTIVE    HIGH RISK FALLS    HIGH RISK READMISSION            Will follow as needed.

## 2022-12-17 NOTE — PROGRESS NOTES
am  12/17/2022 7:01 AM    Admit Date: 12/9/2022    Admit Diagnosis: Chest pain [R07.9]      Subjective:    Patient : Patient resting quietly with no complaints her episode of chest pain apparently resolved yesterday with other underlying conditions no further work-up is planned at this point    Objective:    /62   Pulse 66   Temp 97.9 °F (36.6 °C)   Resp 18   Wt 126 lb 8.7 oz (57.4 kg)   SpO2 96%   BMI 21.06 kg/m²     ROS:  General ROS: negative for - chills  Hematological and Lymphatic ROS: negative for - blood clots or jaundice  Respiratory ROS: no cough, shortness of breath, or wheezing  Cardiovascular ROS: no chest pain or dyspnea on exertion  Gastrointestinal ROS: no abdominal pain, change in bowel habits, or black or bloody stools  Neurological ROS: no TIA or stroke symptoms    Physical Exam:      Physical Examination: General appearance - Appearance: chronically ill appearing.    Neck/lymph - supple, no significant adenopathy  Chest/CV - clear to auscultation, no wheezes, rales or rhonchi, symmetric air entry  Heart - normal rate, regular rhythm, normal S1, S2, no murmurs, rubs, clicks or gallops  Abdomen/GI - soft, nontender, nondistended, no masses or organomegaly   Musculoskeletal - no joint tenderness, deformity or swelling  Extremities - peripheral pulses normal, no pedal edema, no clubbing or cyanosis  Skin - normal coloration and turgor, no rashes, no suspicious skin lesions noted    Current Facility-Administered Medications   Medication Dose Route Frequency    albuterol (PROVENTIL) nebulizer solution 2.5 mg  2.5 mg Nebulization Q4H PRN    predniSONE (DELTASONE) tablet 20 mg  20 mg Oral BID    tuberculin injection 5 Units  5 Units IntraDERmal Once    docusate sodium (COLACE) capsule 200 mg  200 mg Oral Nightly    guaiFENesin (MUCINEX) extended release tablet 1,200 mg  1,200 mg Oral BID    losartan (COZAAR) tablet 25 mg  25 mg Oral Daily    doxycycline hyclate (VIBRAMYCIN) capsule 100 mg  100 mg Oral 2 times per day    medicated lip ointment (BLISTEX)   Topical PRN    guaiFENesin-dextromethorphan (ROBITUSSIN DM) 100-10 MG/5ML syrup 5 mL  5 mL Oral Q4H PRN    budesonide (PULMICORT) nebulizer suspension 500 mcg  0.5 mg Nebulization BID    sodium chloride (Inhalant) 3 % nebulizer solution 4 mL  4 mL Nebulization BID    albuterol (PROVENTIL) nebulizer solution 2.5 mg  2.5 mg Nebulization Q6H WA    acetaminophen (TYLENOL) tablet 650 mg  650 mg Oral Q4H PRN    acyclovir (ZOVIRAX) tablet 800 mg  800 mg Oral BID    albuterol (PROVENTIL) nebulizer solution 2.5 mg  2.5 mg Nebulization Q4H PRN    aspirin chewable tablet 81 mg  81 mg Oral Daily    atorvastatin (LIPITOR) tablet 80 mg  80 mg Oral Nightly    cetirizine (ZYRTEC) tablet 10 mg  10 mg Oral Daily PRN    clopidogrel (PLAVIX) tablet 75 mg  75 mg Oral Daily    escitalopram (LEXAPRO) tablet 10 mg  10 mg Oral Daily    ezetimibe (ZETIA) tablet 10 mg  10 mg Oral Nightly    gabapentin (NEURONTIN) capsule 300 mg  300 mg Oral TID    HYDROcodone-acetaminophen (NORCO) 5-325 MG per tablet 2 tablet  2 tablet Oral Q6H PRN    magic (miracle) mouthwash with nystatin  5 mL Swish & Swallow 4x Daily PRN    methIMAzole (TAPAZOLE) tablet 5 mg  5 mg Oral Daily    metoprolol succinate (TOPROL XL) extended release tablet 100 mg  100 mg Oral Daily    ondansetron (ZOFRAN-ODT) disintegrating tablet 4 mg  4 mg Oral Q8H PRN    pantoprazole (PROTONIX) tablet 40 mg  40 mg Oral BID    Roflumilast (DALIRESP) tablet 500 mcg (Patient Supplied)  500 mcg Oral Nightly    tiotropium (SPIRIVA RESPIMAT) 2.5 MCG/ACT inhaler 2 puff  2 puff Inhalation Daily    [Held by provider] torsemide (DEMADEX) tablet 10 mg  10 mg Oral Daily    traMADol (ULTRAM) tablet 100 mg  100 mg Oral 4x daily    sodium chloride flush 0.9 % injection 5-40 mL  5-40 mL IntraVENous 2 times per day    sodium chloride flush 0.9 % injection 5-40 mL  5-40 mL IntraVENous PRN    0.9 % sodium chloride infusion   IntraVENous PRN polyethylene glycol (GLYCOLAX) packet 17 g  17 g Oral Daily PRN    potassium chloride (KLOR-CON M) extended release tablet 40 mEq  40 mEq Oral PRN    Or    potassium bicarb-citric acid (EFFER-K) effervescent tablet 40 mEq  40 mEq Oral PRN    Or    potassium chloride 10 mEq/100 mL IVPB (Peripheral Line)  10 mEq IntraVENous PRN    magnesium sulfate 2000 mg in 50 mL IVPB premix  2,000 mg IntraVENous PRN    ondansetron (ZOFRAN) injection 4 mg  4 mg IntraVENous Q6H PRN    aluminum & magnesium hydroxide-simethicone (MAALOX) 200-200-20 MG/5ML suspension 30 mL  30 mL Oral Q6H PRN    nitroGLYCERIN (NITROSTAT) SL tablet 0.4 mg  0.4 mg SubLINGual Q5 Min PRN    0.9 % sodium chloride infusion   IntraVENous Continuous    [Held by provider] azithromycin (ZITHROMAX) tablet 500 mg  500 mg Oral Once per day on Mon Wed Fri    LORazepam (ATIVAN) injection 1 mg  1 mg IntraVENous Q6H PRN       Data Review: data included in this note has been independently reviewed by the author     TELEMETRY: Sinus rhythm    Assessment/Plan:     Patient Active Problem List   Diagnosis    Essential hypertension, benign    Ventricular ectopy    Iron deficiency anemia due to chronic blood loss    Carotid stenosis, right    Carotid stenosis    Right ear pain    Pulmonary mass    Chronic anxiety    Coronary artery disease involving native coronary artery of native heart without angina pectoris    Chronic respiratory failure with hypoxia (HCC)    Personal history of tobacco use    COPD, very severe ([facility] [address]    History of MI (myocardial infarction)    H/O carotid endarterectomy    DNR (do not resuscitate)    Acute bilateral low back pain without sciatica    Centrilobular emphysema (HCC)    CAD S/P percutaneous coronary angioplasty    Palliative care patient    Ischemic heart disease    Hypertensive urgency    Hyperlipidemia    Hypoxemia    Nonrheumatic mitral valve regurgitation    Hypokalemic alkalosis    Depression    DDD (degenerative disc disease), lumbar Anxiety    Acute on chronic respiratory failure (HCC)    Adenopathy    Non-small cell lung cancer (HCC)    Hyperthyroidism    Thyroid nodule    Chest pain    Dyspnea    Shingles    COPD exacerbation (HCC)    Acute on chronic respiratory failure with hypoxia (HCC)    Debility    Fatigue    Encounter for palliative care    Chronic mucus hypersecretion, respiratory    ACP (advance care planning)     PLAN  Multiple issues including lung cancer and COPD. Continue treatment per primary team's for this. Chest pain no further cardiac work-up Plan at this time. She has an echo showing normal ejection fraction normal wall thickness normal wall motion.   We will anticipate and plan for either discharge home since she was admitted to our service or if she is unable to be discharged we will ask if hospitalists or oncology will take her in transfer        Citlalli Ladd MD

## 2022-12-18 LAB
MM INDURATION, POC: 0 MM (ref 0–5)
PPD, POC: NEGATIVE

## 2022-12-18 PROCEDURE — 2700000000 HC OXYGEN THERAPY PER DAY

## 2022-12-18 PROCEDURE — 2580000003 HC RX 258: Performed by: NURSE PRACTITIONER

## 2022-12-18 PROCEDURE — 1100000000 HC RM PRIVATE

## 2022-12-18 PROCEDURE — 6370000000 HC RX 637 (ALT 250 FOR IP): Performed by: INTERNAL MEDICINE

## 2022-12-18 PROCEDURE — 6360000002 HC RX W HCPCS: Performed by: INTERNAL MEDICINE

## 2022-12-18 PROCEDURE — 99232 SBSQ HOSP IP/OBS MODERATE 35: CPT | Performed by: INTERNAL MEDICINE

## 2022-12-18 PROCEDURE — 2580000003 HC RX 258: Performed by: INTERNAL MEDICINE

## 2022-12-18 PROCEDURE — 6370000000 HC RX 637 (ALT 250 FOR IP): Performed by: NURSE PRACTITIONER

## 2022-12-18 PROCEDURE — 6370000000 HC RX 637 (ALT 250 FOR IP): Performed by: STUDENT IN AN ORGANIZED HEALTH CARE EDUCATION/TRAINING PROGRAM

## 2022-12-18 PROCEDURE — 94761 N-INVAS EAR/PLS OXIMETRY MLT: CPT

## 2022-12-18 PROCEDURE — 94760 N-INVAS EAR/PLS OXIMETRY 1: CPT

## 2022-12-18 PROCEDURE — 94640 AIRWAY INHALATION TREATMENT: CPT

## 2022-12-18 RX ADMIN — DOXYCYCLINE HYCLATE 100 MG: 100 CAPSULE ORAL at 20:30

## 2022-12-18 RX ADMIN — DOCUSATE SODIUM 200 MG: 100 CAPSULE, LIQUID FILLED ORAL at 20:19

## 2022-12-18 RX ADMIN — SODIUM CHLORIDE, PRESERVATIVE FREE 10 ML: 5 INJECTION INTRAVENOUS at 08:33

## 2022-12-18 RX ADMIN — CLOPIDOGREL BISULFATE 75 MG: 75 TABLET ORAL at 08:32

## 2022-12-18 RX ADMIN — ACYCLOVIR 800 MG: 800 TABLET ORAL at 08:32

## 2022-12-18 RX ADMIN — PANTOPRAZOLE SODIUM 40 MG: 40 TABLET, DELAYED RELEASE ORAL at 20:20

## 2022-12-18 RX ADMIN — SODIUM CHLORIDE, PRESERVATIVE FREE 10 ML: 5 INJECTION INTRAVENOUS at 20:21

## 2022-12-18 RX ADMIN — TRAMADOL HYDROCHLORIDE 100 MG: 50 TABLET ORAL at 16:26

## 2022-12-18 RX ADMIN — BUDESONIDE 500 MCG: 0.5 INHALANT RESPIRATORY (INHALATION) at 07:09

## 2022-12-18 RX ADMIN — ALBUTEROL SULFATE 2.5 MG: 2.5 SOLUTION RESPIRATORY (INHALATION) at 07:09

## 2022-12-18 RX ADMIN — EZETIMIBE 10 MG: 10 TABLET ORAL at 20:19

## 2022-12-18 RX ADMIN — GABAPENTIN 300 MG: 300 CAPSULE ORAL at 20:30

## 2022-12-18 RX ADMIN — ATORVASTATIN CALCIUM 80 MG: 80 TABLET, FILM COATED ORAL at 20:19

## 2022-12-18 RX ADMIN — ESCITALOPRAM OXALATE 10 MG: 10 TABLET ORAL at 08:32

## 2022-12-18 RX ADMIN — ALBUTEROL SULFATE 2.5 MG: 2.5 SOLUTION RESPIRATORY (INHALATION) at 13:39

## 2022-12-18 RX ADMIN — DOXYCYCLINE HYCLATE 100 MG: 100 CAPSULE ORAL at 08:32

## 2022-12-18 RX ADMIN — TIOTROPIUM BROMIDE INHALATION SPRAY 2 PUFF: 3.12 SPRAY, METERED RESPIRATORY (INHALATION) at 07:10

## 2022-12-18 RX ADMIN — METOPROLOL SUCCINATE 100 MG: 100 TABLET, EXTENDED RELEASE ORAL at 08:32

## 2022-12-18 RX ADMIN — GABAPENTIN 300 MG: 300 CAPSULE ORAL at 14:44

## 2022-12-18 RX ADMIN — ACYCLOVIR 800 MG: 800 TABLET ORAL at 20:30

## 2022-12-18 RX ADMIN — ALBUTEROL SULFATE 2.5 MG: 2.5 SOLUTION RESPIRATORY (INHALATION) at 20:04

## 2022-12-18 RX ADMIN — LOSARTAN POTASSIUM 25 MG: 25 TABLET, FILM COATED ORAL at 08:31

## 2022-12-18 RX ADMIN — BUDESONIDE 500 MCG: 0.5 INHALANT RESPIRATORY (INHALATION) at 20:04

## 2022-12-18 RX ADMIN — Medication 4 ML: at 07:09

## 2022-12-18 RX ADMIN — TRAMADOL HYDROCHLORIDE 100 MG: 50 TABLET ORAL at 08:31

## 2022-12-18 RX ADMIN — TRAMADOL HYDROCHLORIDE 100 MG: 50 TABLET ORAL at 20:20

## 2022-12-18 RX ADMIN — PANTOPRAZOLE SODIUM 40 MG: 40 TABLET, DELAYED RELEASE ORAL at 08:32

## 2022-12-18 RX ADMIN — GABAPENTIN 300 MG: 300 CAPSULE ORAL at 08:32

## 2022-12-18 RX ADMIN — GUAIFENESIN 1200 MG: 600 TABLET ORAL at 20:20

## 2022-12-18 RX ADMIN — METHIMAZOLE 5 MG: 5 TABLET ORAL at 08:31

## 2022-12-18 RX ADMIN — ASPIRIN 81 MG: 81 TABLET, CHEWABLE ORAL at 08:32

## 2022-12-18 RX ADMIN — Medication 4 ML: at 20:04

## 2022-12-18 RX ADMIN — TRAMADOL HYDROCHLORIDE 100 MG: 50 TABLET ORAL at 12:12

## 2022-12-18 RX ADMIN — PREDNISONE 20 MG: 20 TABLET ORAL at 20:20

## 2022-12-18 RX ADMIN — GUAIFENESIN 1200 MG: 600 TABLET ORAL at 08:32

## 2022-12-18 RX ADMIN — PREDNISONE 20 MG: 20 TABLET ORAL at 08:32

## 2022-12-18 ASSESSMENT — PAIN SCALES - GENERAL
PAINLEVEL_OUTOF10: 6
PAINLEVEL_OUTOF10: 3
PAINLEVEL_OUTOF10: 3
PAINLEVEL_OUTOF10: 2
PAINLEVEL_OUTOF10: 5
PAINLEVEL_OUTOF10: 8
PAINLEVEL_OUTOF10: 4

## 2022-12-18 ASSESSMENT — PAIN DESCRIPTION - ORIENTATION
ORIENTATION: LEFT

## 2022-12-18 ASSESSMENT — PAIN DESCRIPTION - DESCRIPTORS
DESCRIPTORS: ACHING
DESCRIPTORS: ACHING;SHARP
DESCRIPTORS: SORE
DESCRIPTORS: ACHING

## 2022-12-18 ASSESSMENT — PAIN DESCRIPTION - PAIN TYPE
TYPE: CHRONIC PAIN

## 2022-12-18 ASSESSMENT — PAIN - FUNCTIONAL ASSESSMENT
PAIN_FUNCTIONAL_ASSESSMENT: PREVENTS OR INTERFERES SOME ACTIVE ACTIVITIES AND ADLS

## 2022-12-18 ASSESSMENT — PAIN DESCRIPTION - LOCATION
LOCATION: RIB CAGE
LOCATION: BACK;RIB CAGE
LOCATION: RIB CAGE;BACK
LOCATION: RIB CAGE;BACK

## 2022-12-18 ASSESSMENT — PAIN DESCRIPTION - FREQUENCY
FREQUENCY: CONTINUOUS

## 2022-12-18 ASSESSMENT — PAIN DESCRIPTION - ONSET
ONSET: ON-GOING

## 2022-12-18 NOTE — PROGRESS NOTES
Good visit with patient    She was in great spirits    Watching program on her iPad     Encouraged her    She was thankful

## 2022-12-18 NOTE — PROGRESS NOTES
am  12/18/2022 7:19 AM    Admit Date: 12/9/2022    Admit Diagnosis: Chest pain [R07.9]      Subjective:    Patient : Patient resting quietly with no complaints her episode of chest pain apparently resolved yesterday with other underlying conditions no further work-up is planned at this point    Awaiting short-term rehab    Objective:    /72   Pulse 75   Temp 97.8 °F (36.6 °C) (Oral)   Resp 16   Wt 124 lb (56.2 kg)   SpO2 97%   BMI 20.63 kg/m²     ROS:  General ROS: negative for - chills  Hematological and Lymphatic ROS: negative for - blood clots or jaundice  Respiratory ROS: no cough, shortness of breath, or wheezing  Cardiovascular ROS: no chest pain or dyspnea on exertion  Gastrointestinal ROS: no abdominal pain, change in bowel habits, or black or bloody stools  Neurological ROS: no TIA or stroke symptoms    Physical Exam:      Physical Examination: General appearance - Appearance: chronically ill appearing.    Neck/lymph - supple, no significant adenopathy  Chest/CV - clear to auscultation, no wheezes, rales or rhonchi, symmetric air entry  Heart - normal rate, regular rhythm, normal S1, S2, no murmurs, rubs, clicks or gallops  Abdomen/GI - soft, nontender, nondistended, no masses or organomegaly   Musculoskeletal - no joint tenderness, deformity or swelling  Extremities - peripheral pulses normal, no pedal edema, no clubbing or cyanosis  Skin - normal coloration and turgor, no rashes, no suspicious skin lesions noted    Current Facility-Administered Medications   Medication Dose Route Frequency    albuterol (PROVENTIL) nebulizer solution 2.5 mg  2.5 mg Nebulization Q4H PRN    predniSONE (DELTASONE) tablet 20 mg  20 mg Oral BID    docusate sodium (COLACE) capsule 200 mg  200 mg Oral Nightly    guaiFENesin (MUCINEX) extended release tablet 1,200 mg  1,200 mg Oral BID    losartan (COZAAR) tablet 25 mg  25 mg Oral Daily    doxycycline hyclate (VIBRAMYCIN) capsule 100 mg  100 mg Oral 2 times per day medicated lip ointment (BLISTEX)   Topical PRN    guaiFENesin-dextromethorphan (ROBITUSSIN DM) 100-10 MG/5ML syrup 5 mL  5 mL Oral Q4H PRN    budesonide (PULMICORT) nebulizer suspension 500 mcg  0.5 mg Nebulization BID    sodium chloride (Inhalant) 3 % nebulizer solution 4 mL  4 mL Nebulization BID    albuterol (PROVENTIL) nebulizer solution 2.5 mg  2.5 mg Nebulization Q6H WA    acetaminophen (TYLENOL) tablet 650 mg  650 mg Oral Q4H PRN    acyclovir (ZOVIRAX) tablet 800 mg  800 mg Oral BID    albuterol (PROVENTIL) nebulizer solution 2.5 mg  2.5 mg Nebulization Q4H PRN    aspirin chewable tablet 81 mg  81 mg Oral Daily    atorvastatin (LIPITOR) tablet 80 mg  80 mg Oral Nightly    cetirizine (ZYRTEC) tablet 10 mg  10 mg Oral Daily PRN    clopidogrel (PLAVIX) tablet 75 mg  75 mg Oral Daily    escitalopram (LEXAPRO) tablet 10 mg  10 mg Oral Daily    ezetimibe (ZETIA) tablet 10 mg  10 mg Oral Nightly    gabapentin (NEURONTIN) capsule 300 mg  300 mg Oral TID    HYDROcodone-acetaminophen (NORCO) 5-325 MG per tablet 2 tablet  2 tablet Oral Q6H PRN    magic (miracle) mouthwash with nystatin  5 mL Swish & Swallow 4x Daily PRN    methIMAzole (TAPAZOLE) tablet 5 mg  5 mg Oral Daily    metoprolol succinate (TOPROL XL) extended release tablet 100 mg  100 mg Oral Daily    ondansetron (ZOFRAN-ODT) disintegrating tablet 4 mg  4 mg Oral Q8H PRN    pantoprazole (PROTONIX) tablet 40 mg  40 mg Oral BID    Roflumilast (DALIRESP) tablet 500 mcg (Patient Supplied)  500 mcg Oral Nightly    tiotropium (SPIRIVA RESPIMAT) 2.5 MCG/ACT inhaler 2 puff  2 puff Inhalation Daily    [Held by provider] torsemide (DEMADEX) tablet 10 mg  10 mg Oral Daily    traMADol (ULTRAM) tablet 100 mg  100 mg Oral 4x daily    sodium chloride flush 0.9 % injection 5-40 mL  5-40 mL IntraVENous 2 times per day    sodium chloride flush 0.9 % injection 5-40 mL  5-40 mL IntraVENous PRN    0.9 % sodium chloride infusion   IntraVENous PRN    polyethylene glycol (GLYCOLAX) packet 17 g  17 g Oral Daily PRN    potassium chloride (KLOR-CON M) extended release tablet 40 mEq  40 mEq Oral PRN    Or    potassium bicarb-citric acid (EFFER-K) effervescent tablet 40 mEq  40 mEq Oral PRN    Or    potassium chloride 10 mEq/100 mL IVPB (Peripheral Line)  10 mEq IntraVENous PRN    magnesium sulfate 2000 mg in 50 mL IVPB premix  2,000 mg IntraVENous PRN    ondansetron (ZOFRAN) injection 4 mg  4 mg IntraVENous Q6H PRN    aluminum & magnesium hydroxide-simethicone (MAALOX) 200-200-20 MG/5ML suspension 30 mL  30 mL Oral Q6H PRN    nitroGLYCERIN (NITROSTAT) SL tablet 0.4 mg  0.4 mg SubLINGual Q5 Min PRN    [Held by provider] azithromycin (ZITHROMAX) tablet 500 mg  500 mg Oral Once per day on Mon Wed Fri    LORazepam (ATIVAN) injection 1 mg  1 mg IntraVENous Q6H PRN       Data Review: data included in this note has been independently reviewed by the author     TELEMETRY: Sinus rhythm    Assessment/Plan:     Patient Active Problem List   Diagnosis    Essential hypertension, benign    Ventricular ectopy    Iron deficiency anemia due to chronic blood loss    Carotid stenosis, right    Carotid stenosis    Right ear pain    Pulmonary mass    Chronic anxiety    Coronary artery disease involving native coronary artery of native heart without angina pectoris    Chronic respiratory failure with hypoxia (HCC)    Personal history of tobacco use    COPD, very severe ([address]    History of MI (myocardial infarction)    H/O carotid endarterectomy    DNR (do not resuscitate)    Acute bilateral low back pain without sciatica    Centrilobular emphysema (HCC)    CAD S/P percutaneous coronary angioplasty    Palliative care patient    Ischemic heart disease    Hypertensive urgency    Hyperlipidemia    Hypoxemia    Nonrheumatic mitral valve regurgitation    Hypokalemic alkalosis    Depression    DDD (degenerative disc disease), lumbar    Anxiety    Acute on chronic respiratory failure (HCC)    Adenopathy    Non-small cell lung

## 2022-12-18 NOTE — PROGRESS NOTES
TRANSFER - OUT REPORT:    Verbal report given to JIN Roberts on [address] being transferred to [facility] for routine progression of patient care       Report consisted of patients Situation, Background, Assessment and Recommendations (SBAR). Information from the following report(s) SBAR, recent results, and admission summary was reviewed with the receiving nurse. Opportunity for questions and clarification was provided. Escorted patient and belongings to room #504. Assisted pt into chair. Oxygen running at 1 L and call light within reach.

## 2022-12-19 PROCEDURE — 2580000003 HC RX 258: Performed by: NURSE PRACTITIONER

## 2022-12-19 PROCEDURE — 2580000003 HC RX 258: Performed by: INTERNAL MEDICINE

## 2022-12-19 PROCEDURE — 6360000002 HC RX W HCPCS: Performed by: INTERNAL MEDICINE

## 2022-12-19 PROCEDURE — 1100000000 HC RM PRIVATE

## 2022-12-19 PROCEDURE — 6370000000 HC RX 637 (ALT 250 FOR IP): Performed by: NURSE PRACTITIONER

## 2022-12-19 PROCEDURE — 2700000000 HC OXYGEN THERAPY PER DAY

## 2022-12-19 PROCEDURE — 6370000000 HC RX 637 (ALT 250 FOR IP): Performed by: INTERNAL MEDICINE

## 2022-12-19 PROCEDURE — 6370000000 HC RX 637 (ALT 250 FOR IP): Performed by: STUDENT IN AN ORGANIZED HEALTH CARE EDUCATION/TRAINING PROGRAM

## 2022-12-19 PROCEDURE — 99232 SBSQ HOSP IP/OBS MODERATE 35: CPT | Performed by: INTERNAL MEDICINE

## 2022-12-19 PROCEDURE — 94640 AIRWAY INHALATION TREATMENT: CPT

## 2022-12-19 PROCEDURE — 94761 N-INVAS EAR/PLS OXIMETRY MLT: CPT

## 2022-12-19 RX ADMIN — ACYCLOVIR 800 MG: 800 TABLET ORAL at 20:38

## 2022-12-19 RX ADMIN — ALBUTEROL SULFATE 2.5 MG: 2.5 SOLUTION RESPIRATORY (INHALATION) at 20:09

## 2022-12-19 RX ADMIN — SODIUM CHLORIDE, PRESERVATIVE FREE 10 ML: 5 INJECTION INTRAVENOUS at 08:30

## 2022-12-19 RX ADMIN — BUDESONIDE 500 MCG: 0.5 INHALANT RESPIRATORY (INHALATION) at 07:02

## 2022-12-19 RX ADMIN — PANTOPRAZOLE SODIUM 40 MG: 40 TABLET, DELAYED RELEASE ORAL at 20:38

## 2022-12-19 RX ADMIN — ALBUTEROL SULFATE 2.5 MG: 2.5 SOLUTION RESPIRATORY (INHALATION) at 07:02

## 2022-12-19 RX ADMIN — Medication 4 ML: at 20:09

## 2022-12-19 RX ADMIN — TRAMADOL HYDROCHLORIDE 100 MG: 50 TABLET ORAL at 08:30

## 2022-12-19 RX ADMIN — GUAIFENESIN 1200 MG: 600 TABLET ORAL at 20:38

## 2022-12-19 RX ADMIN — BUDESONIDE 500 MCG: 0.5 INHALANT RESPIRATORY (INHALATION) at 20:09

## 2022-12-19 RX ADMIN — SODIUM CHLORIDE, PRESERVATIVE FREE 10 ML: 5 INJECTION INTRAVENOUS at 20:39

## 2022-12-19 RX ADMIN — PREDNISONE 20 MG: 20 TABLET ORAL at 08:30

## 2022-12-19 RX ADMIN — METOPROLOL SUCCINATE 100 MG: 100 TABLET, EXTENDED RELEASE ORAL at 08:29

## 2022-12-19 RX ADMIN — PANTOPRAZOLE SODIUM 40 MG: 40 TABLET, DELAYED RELEASE ORAL at 08:29

## 2022-12-19 RX ADMIN — DOXYCYCLINE HYCLATE 100 MG: 100 CAPSULE ORAL at 08:30

## 2022-12-19 RX ADMIN — EZETIMIBE 10 MG: 10 TABLET ORAL at 20:38

## 2022-12-19 RX ADMIN — ATORVASTATIN CALCIUM 80 MG: 80 TABLET, FILM COATED ORAL at 20:37

## 2022-12-19 RX ADMIN — METHIMAZOLE 5 MG: 5 TABLET ORAL at 08:29

## 2022-12-19 RX ADMIN — ALBUTEROL SULFATE 2.5 MG: 2.5 SOLUTION RESPIRATORY (INHALATION) at 13:21

## 2022-12-19 RX ADMIN — GABAPENTIN 300 MG: 300 CAPSULE ORAL at 20:38

## 2022-12-19 RX ADMIN — GABAPENTIN 300 MG: 300 CAPSULE ORAL at 08:29

## 2022-12-19 RX ADMIN — ACYCLOVIR 800 MG: 800 TABLET ORAL at 08:29

## 2022-12-19 RX ADMIN — ASPIRIN 81 MG: 81 TABLET, CHEWABLE ORAL at 08:29

## 2022-12-19 RX ADMIN — TRAMADOL HYDROCHLORIDE 100 MG: 50 TABLET ORAL at 11:54

## 2022-12-19 RX ADMIN — CLOPIDOGREL BISULFATE 75 MG: 75 TABLET ORAL at 08:29

## 2022-12-19 RX ADMIN — Medication 4 ML: at 07:02

## 2022-12-19 RX ADMIN — TRAMADOL HYDROCHLORIDE 100 MG: 50 TABLET ORAL at 20:38

## 2022-12-19 RX ADMIN — TRAMADOL HYDROCHLORIDE 100 MG: 50 TABLET ORAL at 16:31

## 2022-12-19 RX ADMIN — DOCUSATE SODIUM 200 MG: 100 CAPSULE, LIQUID FILLED ORAL at 20:38

## 2022-12-19 RX ADMIN — GABAPENTIN 300 MG: 300 CAPSULE ORAL at 14:36

## 2022-12-19 RX ADMIN — GUAIFENESIN 1200 MG: 600 TABLET ORAL at 08:29

## 2022-12-19 RX ADMIN — ESCITALOPRAM OXALATE 10 MG: 10 TABLET ORAL at 08:30

## 2022-12-19 RX ADMIN — PREDNISONE 20 MG: 20 TABLET ORAL at 20:38

## 2022-12-19 ASSESSMENT — PAIN - FUNCTIONAL ASSESSMENT
PAIN_FUNCTIONAL_ASSESSMENT: ACTIVITIES ARE NOT PREVENTED
PAIN_FUNCTIONAL_ASSESSMENT: PREVENTS OR INTERFERES SOME ACTIVE ACTIVITIES AND ADLS

## 2022-12-19 ASSESSMENT — PAIN DESCRIPTION - ONSET
ONSET: ON-GOING

## 2022-12-19 ASSESSMENT — PAIN DESCRIPTION - DESCRIPTORS
DESCRIPTORS: ACHING

## 2022-12-19 ASSESSMENT — PAIN DESCRIPTION - ORIENTATION
ORIENTATION: LEFT

## 2022-12-19 ASSESSMENT — PAIN DESCRIPTION - FREQUENCY
FREQUENCY: CONTINUOUS

## 2022-12-19 ASSESSMENT — PAIN SCALES - GENERAL
PAINLEVEL_OUTOF10: 6
PAINLEVEL_OUTOF10: 2
PAINLEVEL_OUTOF10: 2
PAINLEVEL_OUTOF10: 5
PAINLEVEL_OUTOF10: 3
PAINLEVEL_OUTOF10: 5
PAINLEVEL_OUTOF10: 7

## 2022-12-19 ASSESSMENT — PAIN DESCRIPTION - LOCATION
LOCATION: BACK;RIB CAGE
LOCATION: RIB CAGE
LOCATION: RIB CAGE;BACK
LOCATION: BACK;RIB CAGE

## 2022-12-19 ASSESSMENT — PAIN DESCRIPTION - PAIN TYPE
TYPE: CHRONIC PAIN

## 2022-12-19 NOTE — PLAN OF CARE
Problem: Respiratory - Adult  Goal: Achieves optimal ventilation and oxygenation  Outcome: Progressing  Flowsheets (Taken 12/19/2022 0823)  Achieves optimal ventilation and oxygenation:   Assess for changes in respiratory status   Respiratory therapy support as indicated   Assess for changes in mentation and behavior   Oxygen supplementation based on oxygen saturation or arterial blood gases   Encourage broncho-pulmonary hygiene including cough, deep breathe, incentive spirometry   Assess and instruct to report shortness of breath or any respiratory difficulty

## 2022-12-19 NOTE — CARE COORDINATION
Chart screened by CM for d/c planning. On 12/17 pt was transferred from room 304 to 504 for progression of care. Per previous following CM notes pt is to be d/c to STR. CM met with pt at the bedside to obtain her facility choices. She provided CM with three: [facility], and [address] The issue is pt has 3 appointments next week: On 12/27 she is to have lab work at [address] [place] [address] at the [facility] and then an oncologist appointment at [facility]. Then on 12/28 she has an appointment at 0900 at the [facility] to receive treatment. CM tried to explain to pt that the SNF has to incur this cost but pt insisted that \"her family could transport her. \"  CM is trying to explain that due to Medicare guidelines when a pt is d/c to a SNF for STR that facility incurs the cost and liability and there is not one facility that will accept a pt while undergoing these treatments. CM reached out to the IP NPs for guidance on who CM can contact regarding these appointments to find out if they can be rescheduled so that pt can be d/c to STR. If they cannot then pt will have to d/c home as provider's notes state that pt is medically stable for d/c and is \"waiting on rehab. \"    CM was instructed to reach out to nurse navigator Nelson Robison. CM explained the concerns that would prevent pt from being d/c to STR. Victor M Leonard stated she will be calling the pt to discuss and then get back with CM. Victor M Leonard spoke with Dr. Silverio Adam and he is in agreement to reschedule pt's appointments in order for her to d/c to STR. CM submitted referrals to the 3 facilities that pt requested.

## 2022-12-19 NOTE — PROGRESS NOTES
am  12/19/2022 7:25 AM    Admit Date: 12/9/2022    Admit Diagnosis: Chest pain [R07.9]      Subjective:    Patient : Patient resting quietly with no complaints her episode of chest pain apparently resolved yesterday with other underlying conditions no further work-up is planned at this point    Awaiting short-term rehab    12/19/22  Still awaiting short-term rehab. Patient is clinically stable    Objective:    BP (!) 115/55   Pulse 80   Temp 98.1 °F (36.7 °C) (Oral)   Resp 22   Wt 134 lb 0.6 oz (60.8 kg)   SpO2 94%   BMI 22.31 kg/m²     ROS:  General ROS: negative for - chills  Hematological and Lymphatic ROS: negative for - blood clots or jaundice  Respiratory ROS: no cough, shortness of breath, or wheezing  Cardiovascular ROS: no chest pain or dyspnea on exertion  Gastrointestinal ROS: no abdominal pain, change in bowel habits, or black or bloody stools  Neurological ROS: no TIA or stroke symptoms    Physical Exam:      Physical Examination: General appearance - Appearance: chronically ill appearing.    Neck/lymph - supple, no significant adenopathy  Chest/CV - clear to auscultation, no wheezes, rales or rhonchi, symmetric air entry  Heart - normal rate, regular rhythm, normal S1, S2, no murmurs, rubs, clicks or gallops  Abdomen/GI - soft, nontender, nondistended, no masses or organomegaly   Musculoskeletal - no joint tenderness, deformity or swelling  Extremities - peripheral pulses normal, no pedal edema, no clubbing or cyanosis  Skin - normal coloration and turgor, no rashes, no suspicious skin lesions noted    Current Facility-Administered Medications   Medication Dose Route Frequency    albuterol (PROVENTIL) nebulizer solution 2.5 mg  2.5 mg Nebulization Q4H PRN    predniSONE (DELTASONE) tablet 20 mg  20 mg Oral BID    docusate sodium (COLACE) capsule 200 mg  200 mg Oral Nightly    guaiFENesin (MUCINEX) extended release tablet 1,200 mg  1,200 mg Oral BID    losartan (COZAAR) tablet 25 mg  25 mg Oral Daily    doxycycline hyclate (VIBRAMYCIN) capsule 100 mg  100 mg Oral 2 times per day    medicated lip ointment (BLISTEX)   Topical PRN    guaiFENesin-dextromethorphan (ROBITUSSIN DM) 100-10 MG/5ML syrup 5 mL  5 mL Oral Q4H PRN    budesonide (PULMICORT) nebulizer suspension 500 mcg  0.5 mg Nebulization BID    sodium chloride (Inhalant) 3 % nebulizer solution 4 mL  4 mL Nebulization BID    albuterol (PROVENTIL) nebulizer solution 2.5 mg  2.5 mg Nebulization Q6H WA    acetaminophen (TYLENOL) tablet 650 mg  650 mg Oral Q4H PRN    acyclovir (ZOVIRAX) tablet 800 mg  800 mg Oral BID    albuterol (PROVENTIL) nebulizer solution 2.5 mg  2.5 mg Nebulization Q4H PRN    aspirin chewable tablet 81 mg  81 mg Oral Daily    atorvastatin (LIPITOR) tablet 80 mg  80 mg Oral Nightly    cetirizine (ZYRTEC) tablet 10 mg  10 mg Oral Daily PRN    clopidogrel (PLAVIX) tablet 75 mg  75 mg Oral Daily    escitalopram (LEXAPRO) tablet 10 mg  10 mg Oral Daily    ezetimibe (ZETIA) tablet 10 mg  10 mg Oral Nightly    gabapentin (NEURONTIN) capsule 300 mg  300 mg Oral TID    HYDROcodone-acetaminophen (NORCO) 5-325 MG per tablet 2 tablet  2 tablet Oral Q6H PRN    magic (miracle) mouthwash with nystatin  5 mL Swish & Swallow 4x Daily PRN    methIMAzole (TAPAZOLE) tablet 5 mg  5 mg Oral Daily    metoprolol succinate (TOPROL XL) extended release tablet 100 mg  100 mg Oral Daily    ondansetron (ZOFRAN-ODT) disintegrating tablet 4 mg  4 mg Oral Q8H PRN    pantoprazole (PROTONIX) tablet 40 mg  40 mg Oral BID    Roflumilast (DALIRESP) tablet 500 mcg (Patient Supplied)  500 mcg Oral Nightly    tiotropium (SPIRIVA RESPIMAT) 2.5 MCG/ACT inhaler 2 puff  2 puff Inhalation Daily    [Held by provider] torsemide (DEMADEX) tablet 10 mg  10 mg Oral Daily    traMADol (ULTRAM) tablet 100 mg  100 mg Oral 4x daily    sodium chloride flush 0.9 % injection 5-40 mL  5-40 mL IntraVENous 2 times per day    sodium chloride flush 0.9 % injection 5-40 mL  5-40 mL IntraVENous PRN    0.9 % sodium chloride infusion   IntraVENous PRN    polyethylene glycol (GLYCOLAX) packet 17 g  17 g Oral Daily PRN    potassium chloride (KLOR-CON M) extended release tablet 40 mEq  40 mEq Oral PRN    Or    potassium bicarb-citric acid (EFFER-K) effervescent tablet 40 mEq  40 mEq Oral PRN    Or    potassium chloride 10 mEq/100 mL IVPB (Peripheral Line)  10 mEq IntraVENous PRN    magnesium sulfate 2000 mg in 50 mL IVPB premix  2,000 mg IntraVENous PRN    ondansetron (ZOFRAN) injection 4 mg  4 mg IntraVENous Q6H PRN    aluminum & magnesium hydroxide-simethicone (MAALOX) 200-200-20 MG/5ML suspension 30 mL  30 mL Oral Q6H PRN    nitroGLYCERIN (NITROSTAT) SL tablet 0.4 mg  0.4 mg SubLINGual Q5 Min PRN    [Held by provider] azithromycin (ZITHROMAX) tablet 500 mg  500 mg Oral Once per day on Mon Wed Fri    LORazepam (ATIVAN) injection 1 mg  1 mg IntraVENous Q6H PRN       Data Review: data included in this note has been independently reviewed by the author     TELEMETRY: Sinus rhythm    Assessment/Plan:     Patient Active Problem List   Diagnosis    Essential hypertension, benign    Ventricular ectopy    Iron deficiency anemia due to chronic blood loss    Carotid stenosis, right    Carotid stenosis    Right ear pain    Pulmonary mass    Chronic anxiety    Coronary artery disease involving native coronary artery of native heart without angina pectoris    Chronic respiratory failure with hypoxia (HCC)    Personal history of tobacco use    COPD, very severe ([facility] [address]    History of MI (myocardial infarction)    H/O carotid endarterectomy    DNR (do not resuscitate)    Acute bilateral low back pain without sciatica    Centrilobular emphysema (HCC)    CAD S/P percutaneous coronary angioplasty    Palliative care patient    Ischemic heart disease    Hypertensive urgency    Hyperlipidemia    Hypoxemia    Nonrheumatic mitral valve regurgitation    Hypokalemic alkalosis    Depression    DDD (degenerative disc disease), lumbar Anxiety    Acute on chronic respiratory failure (HCC)    Adenopathy    Non-small cell lung cancer (HCC)    Hyperthyroidism    Thyroid nodule    Chest pain    Dyspnea    Shingles    COPD exacerbation (HCC)    Acute on chronic respiratory failure with hypoxia (HCC)    Debility    Fatigue    Encounter for palliative care    Chronic mucus hypersecretion, respiratory    ACP (advance care planning)     PLAN  Multiple issues including lung cancer and COPD. Continue treatment per primary team's for this. Chest pain no further cardiac work-up Plan at this time. She has an echo showing normal ejection fraction normal wall thickness normal wall motion.   We will anticipate and plan for either discharge home since she was admitted to our service or if she is unable to be discharged we will ask if hospitalists or oncology will take her in transfer    Anticipate short-term rehab    Citlalli Ladd MD

## 2022-12-19 NOTE — PLAN OF CARE
Problem: Discharge Planning  Goal: Discharge to home or other facility with appropriate resources  Outcome: Progressing     Problem: Safety - Adult  Goal: Free from fall injury  Outcome: Progressing     Problem: Pain  Goal: Verbalizes/displays adequate comfort level or baseline comfort level  Outcome: Progressing     Problem: Skin/Tissue Integrity  Goal: Absence of new skin breakdown  Description: 1. Monitor for areas of redness and/or skin breakdown  2. Assess vascular access sites hourly  3. Every 4-6 hours minimum:  Change oxygen saturation probe site  4. Every 4-6 hours:  If on nasal continuous positive airway pressure, respiratory therapy assess nares and determine need for appliance change or resting period.   Outcome: Progressing     Problem: ABCDS Injury Assessment  Goal: Absence of physical injury  Outcome: Progressing     Problem: Respiratory - Adult  Goal: Achieves optimal ventilation and oxygenation  12/19/2022 1216 by Luz Maria Lopez RN  Outcome: Progressing  12/19/2022 0823 by Solomon Esparza RCP  Outcome: Progressing  Flowsheets (Taken 12/19/2022 1743)  Achieves optimal ventilation and oxygenation:   Assess for changes in respiratory status   Respiratory therapy support as indicated   Assess for changes in mentation and behavior   Oxygen supplementation based on oxygen saturation or arterial blood gases   Encourage broncho-pulmonary hygiene including cough, deep breathe, incentive spirometry   Assess and instruct to report shortness of breath or any respiratory difficulty

## 2022-12-19 NOTE — PROGRESS NOTES
Comprehensive Nutrition Assessment    Type and Reason for Visit: Initial, RD Nutrition Re-Screen/LOS  Length of Stay    Nutrition Recommendations/Plan:   Meals and Snacks:  Diet: Continue current order  Nutrition Supplement Therapy:  Medical food supplement therapy:  None  pt declined at this time. Malnutrition Assessment:  Malnutrition Status: At risk for malnutrition (Comment) (Poor intake >1 week during admission.)    thin but, no giana wasting visualized  Nutrition Assessment:  Nutrition History: Pt states eating well and maintaining weight PTA. Her appetite has been less than normal during admission. Do You Have Any Cultural, Sikhism, or Ethnic Food Preferences?: No   Nutrition Background:       Admitted for a/c respiratory failure with SOB and chest pain. Pt has stage 4 lung cancer treated with radiation and chemo- last treatment 6 weeks ago. Dx CAD, HTN, COPD, depression, anxiety, hyperthyroidism, and recently had shingles. Nutrition Interval:  Pt seen sitting up in chair about to eat dinner. Requesting blue cheese dressing which this RD obtained from cafeteria (though it is not on the menu). Pt able to order meals per her preferences and pt declines ONS at this time. Educated pt that ONS are available if she would like to try them during hospital stay. Current Nutrition Therapies:  ADULT DIET;  Regular    Current Intake:   Average Meal Intake: 26-50% Average Supplements Intake: None Ordered      Anthropometric Measures:  Height: 5' 5\" (165.1 cm)  Current Body Wt: 134 lb 0.6 oz (60.8 kg) (12-19), Weight source: Not Specified  BMI: 22.3, Normal Weight (BMI 22.0 to 24.9) age over 72  Admission Body Weight: 130 lb 8 oz (59.2 kg) (12/10 bed scale)  Ideal Body Weight (Kg) (Calculated): 57 kg (125 lbs), 107.2 %  Usual Body Wt: 130 lb (59 kg) (per emr hx), Percent weight change: 3.1       Edema:Peripheral Vascular  Peripheral Vascular (WDL): Within Defined Limits   BMI Category Normal Weight (BMI 22.0 to 24.9) age over 72  Estimated Daily Nutrient Needs:  Energy (kcal/day): 9071-8059 (Kcal/kg (25-30) Weight used: 60.8 kg (12/19) Current  Protein (g/day): 61-73  g(1-1.2 g/kg) Weight Used: (Current) 60.8 kg (12/19)  Fluid (ml/day):   (1 ml/kcal)    Nutrition Diagnosis:   Inadequate oral intake related to  (waning appetite, a/c respiratory failure) as evidenced by intake 26-50%  Nutrition Interventions:   Food and/or Nutrient Delivery: Continue Current Diet (refused ONS. Able to choose meals on her own to her preferences)  Nutrition Education/Counseling: Survival skills/brief education completed (benefit of ONS- pt declined.)  Coordination of Nutrition Care: Continue to monitor while inpatient  Plan of Care discussed with: Romie Faulkner RN; patient    Goals:       Active Goal: PO intake 50% or greater, by next RD assessment       Nutrition Monitoring and Evaluation:      Food/Nutrient Intake Outcomes: Food and Nutrient Intake  Physical Signs/Symptoms Outcomes: Weight    Discharge Planning:    Continue current diet    Lino Tipton RD

## 2022-12-20 LAB
SARS-COV-2 RDRP RESP QL NAA+PROBE: NOT DETECTED
SOURCE: NORMAL

## 2022-12-20 PROCEDURE — 99232 SBSQ HOSP IP/OBS MODERATE 35: CPT | Performed by: INTERNAL MEDICINE

## 2022-12-20 PROCEDURE — 6370000000 HC RX 637 (ALT 250 FOR IP): Performed by: STUDENT IN AN ORGANIZED HEALTH CARE EDUCATION/TRAINING PROGRAM

## 2022-12-20 PROCEDURE — 6370000000 HC RX 637 (ALT 250 FOR IP): Performed by: NURSE PRACTITIONER

## 2022-12-20 PROCEDURE — 6370000000 HC RX 637 (ALT 250 FOR IP): Performed by: INTERNAL MEDICINE

## 2022-12-20 PROCEDURE — 94760 N-INVAS EAR/PLS OXIMETRY 1: CPT

## 2022-12-20 PROCEDURE — 94640 AIRWAY INHALATION TREATMENT: CPT

## 2022-12-20 PROCEDURE — 97530 THERAPEUTIC ACTIVITIES: CPT

## 2022-12-20 PROCEDURE — 97535 SELF CARE MNGMENT TRAINING: CPT

## 2022-12-20 PROCEDURE — 2580000003 HC RX 258: Performed by: NURSE PRACTITIONER

## 2022-12-20 PROCEDURE — 94761 N-INVAS EAR/PLS OXIMETRY MLT: CPT

## 2022-12-20 PROCEDURE — 6360000002 HC RX W HCPCS: Performed by: INTERNAL MEDICINE

## 2022-12-20 PROCEDURE — 97112 NEUROMUSCULAR REEDUCATION: CPT

## 2022-12-20 PROCEDURE — 2580000003 HC RX 258: Performed by: INTERNAL MEDICINE

## 2022-12-20 PROCEDURE — 2700000000 HC OXYGEN THERAPY PER DAY

## 2022-12-20 PROCEDURE — 87635 SARS-COV-2 COVID-19 AMP PRB: CPT

## 2022-12-20 PROCEDURE — 1100000000 HC RM PRIVATE

## 2022-12-20 PROCEDURE — 97110 THERAPEUTIC EXERCISES: CPT

## 2022-12-20 RX ADMIN — GABAPENTIN 300 MG: 300 CAPSULE ORAL at 08:48

## 2022-12-20 RX ADMIN — TRAMADOL HYDROCHLORIDE 100 MG: 50 TABLET ORAL at 15:29

## 2022-12-20 RX ADMIN — EZETIMIBE 10 MG: 10 TABLET ORAL at 20:44

## 2022-12-20 RX ADMIN — ALBUTEROL SULFATE 2.5 MG: 2.5 SOLUTION RESPIRATORY (INHALATION) at 20:01

## 2022-12-20 RX ADMIN — GUAIFENESIN 1200 MG: 600 TABLET ORAL at 08:48

## 2022-12-20 RX ADMIN — ASPIRIN 81 MG: 81 TABLET, CHEWABLE ORAL at 08:48

## 2022-12-20 RX ADMIN — CLOPIDOGREL BISULFATE 75 MG: 75 TABLET ORAL at 08:48

## 2022-12-20 RX ADMIN — ALBUTEROL SULFATE 2.5 MG: 2.5 SOLUTION RESPIRATORY (INHALATION) at 12:54

## 2022-12-20 RX ADMIN — TIOTROPIUM BROMIDE INHALATION SPRAY 2 PUFF: 3.12 SPRAY, METERED RESPIRATORY (INHALATION) at 07:02

## 2022-12-20 RX ADMIN — ATORVASTATIN CALCIUM 80 MG: 80 TABLET, FILM COATED ORAL at 20:44

## 2022-12-20 RX ADMIN — TRAMADOL HYDROCHLORIDE 100 MG: 50 TABLET ORAL at 20:44

## 2022-12-20 RX ADMIN — Medication 4 ML: at 20:01

## 2022-12-20 RX ADMIN — SODIUM CHLORIDE, PRESERVATIVE FREE 10 ML: 5 INJECTION INTRAVENOUS at 08:49

## 2022-12-20 RX ADMIN — BUDESONIDE 500 MCG: 0.5 INHALANT RESPIRATORY (INHALATION) at 20:01

## 2022-12-20 RX ADMIN — PREDNISONE 20 MG: 20 TABLET ORAL at 20:44

## 2022-12-20 RX ADMIN — PANTOPRAZOLE SODIUM 40 MG: 40 TABLET, DELAYED RELEASE ORAL at 08:48

## 2022-12-20 RX ADMIN — ACYCLOVIR 800 MG: 800 TABLET ORAL at 08:49

## 2022-12-20 RX ADMIN — GUAIFENESIN 1200 MG: 600 TABLET ORAL at 20:44

## 2022-12-20 RX ADMIN — PANTOPRAZOLE SODIUM 40 MG: 40 TABLET, DELAYED RELEASE ORAL at 20:44

## 2022-12-20 RX ADMIN — LOSARTAN POTASSIUM 25 MG: 25 TABLET, FILM COATED ORAL at 08:48

## 2022-12-20 RX ADMIN — TRAMADOL HYDROCHLORIDE 100 MG: 50 TABLET ORAL at 08:48

## 2022-12-20 RX ADMIN — ACYCLOVIR 800 MG: 800 TABLET ORAL at 20:44

## 2022-12-20 RX ADMIN — ACETAMINOPHEN 650 MG: 325 TABLET, FILM COATED ORAL at 06:17

## 2022-12-20 RX ADMIN — ALBUTEROL SULFATE 2.5 MG: 2.5 SOLUTION RESPIRATORY (INHALATION) at 07:00

## 2022-12-20 RX ADMIN — SODIUM CHLORIDE, PRESERVATIVE FREE 10 ML: 5 INJECTION INTRAVENOUS at 20:46

## 2022-12-20 RX ADMIN — Medication 4 ML: at 07:11

## 2022-12-20 RX ADMIN — ESCITALOPRAM OXALATE 10 MG: 10 TABLET ORAL at 08:48

## 2022-12-20 RX ADMIN — GABAPENTIN 300 MG: 300 CAPSULE ORAL at 14:25

## 2022-12-20 RX ADMIN — TRAMADOL HYDROCHLORIDE 100 MG: 50 TABLET ORAL at 12:05

## 2022-12-20 RX ADMIN — GABAPENTIN 300 MG: 300 CAPSULE ORAL at 20:43

## 2022-12-20 RX ADMIN — PREDNISONE 20 MG: 20 TABLET ORAL at 08:48

## 2022-12-20 RX ADMIN — METOPROLOL SUCCINATE 100 MG: 100 TABLET, EXTENDED RELEASE ORAL at 08:49

## 2022-12-20 RX ADMIN — BUDESONIDE 500 MCG: 0.5 INHALANT RESPIRATORY (INHALATION) at 07:01

## 2022-12-20 RX ADMIN — METHIMAZOLE 5 MG: 5 TABLET ORAL at 08:48

## 2022-12-20 ASSESSMENT — PAIN SCALES - GENERAL
PAINLEVEL_OUTOF10: 3
PAINLEVEL_OUTOF10: 0

## 2022-12-20 ASSESSMENT — PAIN DESCRIPTION - DESCRIPTORS: DESCRIPTORS: BURNING

## 2022-12-20 ASSESSMENT — PAIN DESCRIPTION - ORIENTATION: ORIENTATION: LEFT;MID

## 2022-12-20 ASSESSMENT — PAIN DESCRIPTION - PAIN TYPE: TYPE: NEUROPATHIC PAIN;CHRONIC PAIN

## 2022-12-20 ASSESSMENT — PAIN DESCRIPTION - FREQUENCY: FREQUENCY: INTERMITTENT

## 2022-12-20 ASSESSMENT — PAIN - FUNCTIONAL ASSESSMENT: PAIN_FUNCTIONAL_ASSESSMENT: ACTIVITIES ARE NOT PREVENTED

## 2022-12-20 ASSESSMENT — PAIN DESCRIPTION - LOCATION: LOCATION: BACK

## 2022-12-20 NOTE — CARE COORDINATION
Update on STR referrals:  [facility] has declined referral stating \"no bed availability. \"  [address] still pending. Pt has been accepted to [facility]. They will have a bed available on 12/21. CM updated Dr. Floresita Roberts via [employer] as well as pt's bedside nurse. Pt was asleep when CM went to the room.   CM requested pt's bedside nurse to order a rapid COVID-19 test.

## 2022-12-20 NOTE — PROGRESS NOTES
ACUTE PHYSICAL THERAPY GOALS:   (Developed with and agreed upon by patient and/or caregiver.)  (1.) Liyah Arteaga  will move from supine to sit and sit to supine  with INDEPENDENT within 7 treatment day(s). (2.) Liyah Arteaga will transfer from bed to chair and chair to bed with MODIFIED INDEPENDENCE using the least restrictive device within 7 treatment day(s). (3.) Liyah Arteaga will ambulate with MODIFIED INDEPENDENCE for 150 feet with the least restrictive device within 7 treatment day(s). (4.) Liyah Arteaga will perform standing static and dynamic balance activities x 15 minutes with MODIFIED INDEPENDENCE to improve safety within 7 treatment day(s). PHYSICAL THERAPY: Daily Note AM   (Link to Caseload Tracking: PT Visit Days : 4  Time In/Out PT Charge Capture  Rehab Caseload Tracker  Orders    Liyah Arteaga is a 67 y.o. female   PRIMARY DIAGNOSIS: Acute on chronic respiratory failure with hypoxia ([facility] [address]  Chest pain [R07.9]       Inpatient: Payor: Shoaib Castroor / Plan: MEDICARE PART A AND B / Product Type: *No Product type* /     ASSESSMENT:     REHAB RECOMMENDATIONS:   Recommendation to date pending progress:  Setting:  Short-term Rehab      Equipment:    To Be Determined     ASSESSMENT:  Ms. Flako Chandler was sitting up in chair on arrival. She is motivated and ready to participate with therapy. She ambulated 100' with RW and CGA on 3L O2 with sats in the 90s. She required multiple standing rest breaks. BLE exercises performed while up in chair. Pt left in chair with needs in reach.         SUBJECTIVE:   Ms. Flako Chandler states, \"I have to do better\"     Social/Functional Lives With: Spouse  Type of Home: House  Home Layout: One level  Bathroom Shower/Tub: Walk-in shower  Bathroom Equipment: Grab bars in shower, Shower chair  Home Equipment: Jairo Robles Dawson, rolling, Oxygen  Receives Help From: Family  ADL Assistance: Needs assistance  Ambulation Assistance: Needs assistance  Occupation: Retired  OBJECTIVE:     PAIN: VITALS / O2: PRECAUTION / Yuni Hughs / DRAINS:   Pre Treatment: 0/10         Post Treatment: 0/10 Vitals        Oxygen   3L NC; sats in the 90s Telemetry     RESTRICTIONS/PRECAUTIONS:  Restrictions/Precautions  Restrictions/Precautions: Fall Risk  Restrictions/Precautions: Fall Risk     MOBILITY: I Mod I S SBA CGA Min Mod Max Total  NT x2 Comments:   Bed Mobility    Rolling [] [] [] [] [] [] [] [] [] [x] []    Supine to Sit [] [] [] [] [] [] [] [] [] [x] []    Scooting [] [] [] [] [] [] [] [] [] [x] []    Sit to Supine [] [] [] [] [] [] [] [] [] [x] []    Transfers    Sit to Stand [] [] [] [x] [] [] [] [] [] [] []    Bed to Chair [] [] [] [] [] [] [] [] [] [x] []    Stand to Sit [] [] [] [x] [] [] [] [] [] [] []     [] [] [] [] [] [] [] [] [] [] []    I=Independent, Mod I=Modified Independent, S=Supervision, SBA=Standby Assistance, CGA=Contact Guard Assistance,   Min=Minimal Assistance, Mod=Moderate Assistance, Max=Maximal Assistance, Total=Total Assistance, NT=Not Tested    BALANCE: Good Fair+ Fair Fair- Poor NT Comments   Sitting Static [x] [] [] [] [] []    Sitting Dynamic [] [x] [] [] [] []              Standing Static [] [x] [] [] [] []    Standing Dynamic [] [x] [x] [] [] []      GAIT: I Mod I S SBA CGA Min Mod Max Total  NT x2 Comments:   Level of Assistance [] [] [] [x] [x] [] [] [] [] [] []    Distance 100 (multiple standing rest breaks) feet    DME Rolling Walker    Gait Quality Decreased german , Decreased step clearance, Decreased step length, Narrow base of support, Shuffling , and Trunk sway increased    Weightbearing Status      Stairs      I=Independent, Mod I=Modified Independent, S=Supervision, SBA=Standby Assistance, CGA=Contact Guard Assistance,   Min=Minimal Assistance, Mod=Moderate Assistance, Max=Maximal Assistance, Total=Total Assistance, NT=Not Tested    PLAN:   FREQUENCY AND DURATION: 3 times/week for duration of hospital stay or until stated goals are met, whichever comes first.    TREATMENT:   TREATMENT:   Therapeutic Activity (20 Minutes): Therapeutic activity included Scooting, Transfer Training, Ambulation on level ground, Sitting balance , and Standing balance to improve functional Activity tolerance, Balance, Mobility, and Strength. Therapeutic Exercise (10 Minutes): Therapeutic exercises noted below to improve functional activity tolerance, strength, and mobility.      TREATMENT GRID:   Date:  12/20/22 Date:   Date:     Activity/Exercise Parameters Parameters Parameters   Heel/toe taps 10 B     LAQs 10 B     marching 10 B     Hip ABD/ADD 10 B                         AFTER TREATMENT PRECAUTIONS: Bed/Chair Locked, Call light within reach, Chair, Needs within reach, and RN notified    INTERDISCIPLINARY COLLABORATION:  RN/ PCT and PT/ PTA    EDUCATION:      TIME IN/OUT:  Time In: 1140  Time Out: 1210  Minutes: [address] PTA

## 2022-12-20 NOTE — PROGRESS NOTES
ACUTE OCCUPATIONAL THERAPY GOALS:   (Developed with and agreed upon by patient and/or caregiver.)  1. Pt will complete LB ADL CGA with AE as needed. 2. Pt will complete toileting CGA with AE as needed. 3. Pt will complete UB ADL supervision. 4. Pt will tolerate 25 minutes of OT treatment requiring 2-3 breaks as needed. 5. Pt will complete grooming tasks while standing at sink Min A.  6. Pt will complete functional mobility via RW CGA. 7. Pt will tolerate BUE exercises to increase strength for safe, functional transfers, ADL participation. 8. Pt will self initiate using energy conservation techniques during functional mobility and ADLs 100% of the time. Timeframe: 7 days    OCCUPATIONAL THERAPY: Daily Note AM   OT Visit Days: 2   Time In/Out  OT Charge Capture  Rehab Caseload Tracker  OT Orders    Maryuri Cabral is a 67 y.o. female   PRIMARY DIAGNOSIS: Acute on chronic respiratory failure with hypoxia (HCC)  Chest pain [R07.9]       Inpatient: Payor: MEDICARE / Plan: MEDICARE PART A AND B / Product Type: *No Product type* /     ASSESSMENT:     REHAB RECOMMENDATIONS: CURRENT LEVEL OF FUNCTION:  (Most Recently Demonstrated)   Recommendation to date pending progress:  Setting:  Short-term Rehab    Equipment:    To Be Determined Bathing:  Not Tested  Dressing:  Not Tested  Feeding/Grooming:  Not Tested  Toileting:  Not Tested  Functional Mobility:  Stand by Assist/CGA     ASSESSMENT:  Ms. Damian Rojas is doing better today. Pt presents sitting up in chair upon arrival. Pt educated on energy conservation techniques. Pt verbalized understanding. Pt was able to perform functional mobility in hallway with RW today. One short standing rest break required. Did a lot better with mobility than in previous sessions. Pt returned to room and performed UE exercises to increase strength and activity tolerance. Pt tolerated exercises with brief rest breaks in between sets. Good session for patient.  Making some progress with goals. Will continue to benefit from skilled OT during stay.        SUBJECTIVE:     Ms. Devon Lee states, \"I need a moment\"     Social/Functional Lives With: Spouse  Type of Home: House  Home Layout: One level  Bathroom Shower/Tub: Walk-in shower  Bathroom Equipment: Grab bars in shower, Shower chair  Home Equipment: Roxanne , rolling, Oxygen  Receives Help From: Family  ADL Assistance: Needs assistance  Ambulation Assistance: Needs assistance  Occupation: Retired    OBJECTIVE:     LINES / Charmayne Big / Sy Hatchet: NA    RESTRICTIONS/PRECAUTIONS:  Restrictions/Precautions  Restrictions/Precautions: Fall Risk        PAIN: Alvarado Andriy / O2:   Pre Treatment:            Post Treatment: 0 Vitals          Oxygen        MOBILITY: I Mod I S SBA CGA Min Mod Max Total  NT x2 Comments:   Bed Mobility    Rolling [] [] [] [] [] [] [] [] [] [x] []    Supine to Sit [] [] [] [] [] [] [] [] [] [x] []    Scooting [] [] [] [] [] [] [] [] [] [x] []    Sit to Supine [] [] [] [] [] [] [] [] [] [x] []    Transfers    Sit to Stand [] [] [] [x] [x] [] [] [] [] [] []    Bed to Chair [] [] [] [] [] [] [] [] [] [x] []    Stand to Sit [] [] [] [x] [] [] [] [] [] [] []    Tub/Shower [] [] [] [] [] [] [] [] [] [x] []     Toilet [] [] [] [] [] [] [] [] [] [x] []      [] [] [] [] [] [] [] [] [] [] []    I=Independent, Mod I=Modified Independent, S=Supervision/Setup, SBA=Standby Assistance, CGA=Contact Guard Assistance, Min=Minimal Assistance, Mod=Moderate Assistance, Max=Maximal Assistance, Total=Total Assistance, NT=Not Tested    ACTIVITIES OF DAILY LIVING: I Mod I S SBA CGA Min Mod Max Total NT Comments   BASIC ADLs:              Upper Body   Bathing [] [] [] [] [] [] [] [] [] [x]    Lower Body Bathing [] [] [] [] [] [] [] [] [] [x]    Toileting [] [] [] [] [] [] [] [] [] [x]    Upper Body Dressing [] [] [] [] [] [] [] [] [] [x]    Lower Body Dressing [] [] [] [] [] [] [] [] [] [x]    Feeding [] [] [] [] [] [] [] [] [] [x]    Grooming [] [] [] [] [] [] [] [] [] [x]    Personal Device Care [] [] [] [] [] [] [] [] [] [x]    Functional Mobility [] [] [] [x] [x] [] [] [] [] []    I=Independent, Mod I=Modified Independent, S=Supervision/Setup, SBA=Standby Assistance, CGA=Contact Guard Assistance, Min=Minimal Assistance, Mod=Moderate Assistance, Max=Maximal Assistance, Total=Total Assistance, NT=Not Tested    BALANCE: Good Fair+ Fair Fair- Poor NT Comments   Sitting Static [x] [] [] [] [] []    Sitting Dynamic [x] [] [] [] [] []              Standing Static [] [x] [] [] [] []    Standing Dynamic [] [x] [] [] [] []        PLAN:     FREQUENCY/DURATION   OT Plan of Care: 3 times/week for duration of hospital stay or until stated goals are met, whichever comes first.    TREATMENT:     TREATMENT:   Co-Treatment PT/OT necessary due to patient's decreased overall endurance/tolerance levels, as well as need for high level skilled assistance to complete functional transfers/mobility and functional tasks  Neuromuscular Re-education (15 Minutes): Neuromuscular Re-education included Balance Training, Coordination training, Postural training, Sitting balance training, and Standing balance training to improve Balance, Coordination, Functional Mobility, and Postural Control. Self Care (15 minutes): Patient participated in Energy Conservation Education ADLs in supported sitting with minimal verbal cueing to increase independence, decrease assistance required, and increase activity tolerance. Patient also participated in energy conservation and adaptive equipment training to increase independence, decrease assistance required, and increase activity tolerance.        TREATMENT GRID:  N/A    AFTER TREATMENT PRECAUTIONS: Bed/Chair Locked, Call light within reach, Chair, and Needs within reach    INTERDISCIPLINARY COLLABORATION:  RN/ PCT, PT/ PTA, and OT/ LAU    EDUCATION:       TOTAL TREATMENT DURATION AND TIME:  Time In: 1140  Time Out: 1210  Minutes: 30    Kaylan WOODSON Alice Cobb

## 2022-12-20 NOTE — PROGRESS NOTES
am  12/20/2022 7:01 AM    Admit Date: 12/9/2022    Admit Diagnosis: Chest pain [R07.9]      Subjective:    Patient : Patient resting quietly with no complaints her episode of chest pain apparently resolved yesterday with other underlying conditions no further work-up is planned at this point    Awaiting short-term rehab    12/19/22  Still awaiting short-term rehab. Patient is clinically stable    Objective:    BP (!) 129/50   Pulse 75   Temp 97.9 °F (36.6 °C) (Oral)   Resp 17   Ht 5' 5\" (1.651 m)   Wt 121 lb (54.9 kg)   SpO2 98%   BMI 20.14 kg/m²     ROS:  General ROS: negative for - chills  Hematological and Lymphatic ROS: negative for - blood clots or jaundice  Respiratory ROS: no cough, shortness of breath, or wheezing  Cardiovascular ROS: no chest pain or dyspnea on exertion  Gastrointestinal ROS: no abdominal pain, change in bowel habits, or black or bloody stools  Neurological ROS: no TIA or stroke symptoms    Physical Exam:      Physical Examination: General appearance - Appearance: chronically ill appearing.    Neck/lymph - supple, no significant adenopathy  Chest/CV - clear to auscultation, no wheezes, rales or rhonchi, symmetric air entry  Heart - normal rate, regular rhythm, normal S1, S2, no murmurs, rubs, clicks or gallops  Abdomen/GI - soft, nontender, nondistended, no masses or organomegaly   Musculoskeletal - no joint tenderness, deformity or swelling  Extremities - peripheral pulses normal, no pedal edema, no clubbing or cyanosis  Skin - normal coloration and turgor, no rashes, no suspicious skin lesions noted    Current Facility-Administered Medications   Medication Dose Route Frequency    albuterol (PROVENTIL) nebulizer solution 2.5 mg  2.5 mg Nebulization Q4H PRN    predniSONE (DELTASONE) tablet 20 mg  20 mg Oral BID    docusate sodium (COLACE) capsule 200 mg  200 mg Oral Nightly    guaiFENesin (MUCINEX) extended release tablet 1,200 mg  1,200 mg Oral BID    losartan (COZAAR) tablet 25 mg 25 mg Oral Daily    medicated lip ointment (BLISTEX)   Topical PRN    guaiFENesin-dextromethorphan (ROBITUSSIN DM) 100-10 MG/5ML syrup 5 mL  5 mL Oral Q4H PRN    budesonide (PULMICORT) nebulizer suspension 500 mcg  0.5 mg Nebulization BID    sodium chloride (Inhalant) 3 % nebulizer solution 4 mL  4 mL Nebulization BID    albuterol (PROVENTIL) nebulizer solution 2.5 mg  2.5 mg Nebulization Q6H WA    acetaminophen (TYLENOL) tablet 650 mg  650 mg Oral Q4H PRN    acyclovir (ZOVIRAX) tablet 800 mg  800 mg Oral BID    albuterol (PROVENTIL) nebulizer solution 2.5 mg  2.5 mg Nebulization Q4H PRN    aspirin chewable tablet 81 mg  81 mg Oral Daily    atorvastatin (LIPITOR) tablet 80 mg  80 mg Oral Nightly    cetirizine (ZYRTEC) tablet 10 mg  10 mg Oral Daily PRN    clopidogrel (PLAVIX) tablet 75 mg  75 mg Oral Daily    escitalopram (LEXAPRO) tablet 10 mg  10 mg Oral Daily    ezetimibe (ZETIA) tablet 10 mg  10 mg Oral Nightly    gabapentin (NEURONTIN) capsule 300 mg  300 mg Oral TID    HYDROcodone-acetaminophen (NORCO) 5-325 MG per tablet 2 tablet  2 tablet Oral Q6H PRN    magic (miracle) mouthwash with nystatin  5 mL Swish & Swallow 4x Daily PRN    methIMAzole (TAPAZOLE) tablet 5 mg  5 mg Oral Daily    metoprolol succinate (TOPROL XL) extended release tablet 100 mg  100 mg Oral Daily    ondansetron (ZOFRAN-ODT) disintegrating tablet 4 mg  4 mg Oral Q8H PRN    pantoprazole (PROTONIX) tablet 40 mg  40 mg Oral BID    Roflumilast (DALIRESP) tablet 500 mcg (Patient Supplied)  500 mcg Oral Nightly    tiotropium (SPIRIVA RESPIMAT) 2.5 MCG/ACT inhaler 2 puff  2 puff Inhalation Daily    [Held by provider] torsemide (DEMADEX) tablet 10 mg  10 mg Oral Daily    traMADol (ULTRAM) tablet 100 mg  100 mg Oral 4x daily    sodium chloride flush 0.9 % injection 5-40 mL  5-40 mL IntraVENous 2 times per day    sodium chloride flush 0.9 % injection 5-40 mL  5-40 mL IntraVENous PRN    0.9 % sodium chloride infusion   IntraVENous PRN    polyethylene glycol (GLYCOLAX) packet 17 g  17 g Oral Daily PRN    potassium chloride (KLOR-CON M) extended release tablet 40 mEq  40 mEq Oral PRN    Or    potassium bicarb-citric acid (EFFER-K) effervescent tablet 40 mEq  40 mEq Oral PRN    Or    potassium chloride 10 mEq/100 mL IVPB (Peripheral Line)  10 mEq IntraVENous PRN    magnesium sulfate 2000 mg in 50 mL IVPB premix  2,000 mg IntraVENous PRN    ondansetron (ZOFRAN) injection 4 mg  4 mg IntraVENous Q6H PRN    aluminum & magnesium hydroxide-simethicone (MAALOX) 200-200-20 MG/5ML suspension 30 mL  30 mL Oral Q6H PRN    nitroGLYCERIN (NITROSTAT) SL tablet 0.4 mg  0.4 mg SubLINGual Q5 Min PRN    LORazepam (ATIVAN) injection 1 mg  1 mg IntraVENous Q6H PRN       Data Review: data included in this note has been independently reviewed by the author     TELEMETRY: Sinus rhythm    Assessment/Plan:     Patient Active Problem List   Diagnosis    Essential hypertension, benign    Ventricular ectopy    Iron deficiency anemia due to chronic blood loss    Carotid stenosis, right    Carotid stenosis    Right ear pain    Pulmonary mass    Chronic anxiety    Coronary artery disease involving native coronary artery of native heart without angina pectoris    Chronic respiratory failure with hypoxia (HCC)    Personal history of tobacco use    COPD, very severe ([address]    History of MI (myocardial infarction)    H/O carotid endarterectomy    DNR (do not resuscitate)    Acute bilateral low back pain without sciatica    Centrilobular emphysema (HCC)    CAD S/P percutaneous coronary angioplasty    Palliative care patient    Ischemic heart disease    Hypertensive urgency    Hyperlipidemia    Hypoxemia    Nonrheumatic mitral valve regurgitation    Hypokalemic alkalosis    Depression    DDD (degenerative disc disease), lumbar    Anxiety    Acute on chronic respiratory failure (HCC)    Adenopathy    Non-small cell lung cancer (HCC)    Hyperthyroidism    Thyroid nodule    Chest pain    Dyspnea Shingles    COPD exacerbation (HCC)    Acute on chronic respiratory failure with hypoxia (HCC)    Debility    Fatigue    Encounter for palliative care    Chronic mucus hypersecretion, respiratory    ACP (advance care planning)     PLAN  Multiple issues including lung cancer and COPD. Continue treatment per primary team's for this. Chest pain no further cardiac work-up Plan at this time. She has an echo showing normal ejection fraction normal wall thickness normal wall motion.   We will anticipate and plan for either discharge home since she was admitted to our service or if she is unable to be discharged we will ask if hospitalists or oncology will take her in transfer    Anticipate short-term rehab    Mary Noriega MD

## 2022-12-21 ENCOUNTER — TELEPHONE (OUTPATIENT)
Dept: ONCOLOGY | Age: 72
End: 2022-12-21

## 2022-12-21 VITALS
HEIGHT: 65 IN | DIASTOLIC BLOOD PRESSURE: 55 MMHG | WEIGHT: 121 LBS | SYSTOLIC BLOOD PRESSURE: 106 MMHG | BODY MASS INDEX: 20.16 KG/M2 | TEMPERATURE: 97.5 F | HEART RATE: 66 BPM | RESPIRATION RATE: 18 BRPM | OXYGEN SATURATION: 97 %

## 2022-12-21 PROBLEM — R07.9 CHEST PAIN: Status: RESOLVED | Noted: 2022-01-01 | Resolved: 2022-01-01

## 2022-12-21 PROBLEM — Z71.89 ACP (ADVANCE CARE PLANNING): Status: RESOLVED | Noted: 2022-01-01 | Resolved: 2022-01-01

## 2022-12-21 PROBLEM — J44.1 COPD EXACERBATION (HCC): Status: RESOLVED | Noted: 2022-01-01 | Resolved: 2022-01-01

## 2022-12-21 PROBLEM — J96.21 ACUTE ON CHRONIC RESPIRATORY FAILURE WITH HYPOXIA (HCC): Status: RESOLVED | Noted: 2022-01-01 | Resolved: 2022-01-01

## 2022-12-21 PROCEDURE — 6370000000 HC RX 637 (ALT 250 FOR IP): Performed by: NURSE PRACTITIONER

## 2022-12-21 PROCEDURE — 94640 AIRWAY INHALATION TREATMENT: CPT

## 2022-12-21 PROCEDURE — 94669 MECHANICAL CHEST WALL OSCILL: CPT

## 2022-12-21 PROCEDURE — 6370000000 HC RX 637 (ALT 250 FOR IP): Performed by: INTERNAL MEDICINE

## 2022-12-21 PROCEDURE — 2580000003 HC RX 258: Performed by: NURSE PRACTITIONER

## 2022-12-21 PROCEDURE — 2580000003 HC RX 258: Performed by: INTERNAL MEDICINE

## 2022-12-21 PROCEDURE — 6360000002 HC RX W HCPCS: Performed by: INTERNAL MEDICINE

## 2022-12-21 PROCEDURE — 2700000000 HC OXYGEN THERAPY PER DAY

## 2022-12-21 PROCEDURE — 6370000000 HC RX 637 (ALT 250 FOR IP): Performed by: STUDENT IN AN ORGANIZED HEALTH CARE EDUCATION/TRAINING PROGRAM

## 2022-12-21 RX ORDER — POLYETHYLENE GLYCOL 3350 17 G/17G
17 POWDER, FOR SOLUTION ORAL DAILY PRN
Qty: 527 G | Refills: 1 | Status: SHIPPED | OUTPATIENT
Start: 2022-12-21 | End: 2023-01-20

## 2022-12-21 RX ORDER — LOSARTAN POTASSIUM 25 MG/1
25 TABLET ORAL DAILY
Qty: 30 TABLET | Refills: 3 | Status: SHIPPED | OUTPATIENT
Start: 2022-12-22

## 2022-12-21 RX ORDER — PREDNISONE 20 MG/1
20 TABLET ORAL 2 TIMES DAILY
Qty: 20 TABLET | Refills: 0 | Status: SHIPPED | OUTPATIENT
Start: 2022-12-21 | End: 2022-12-31

## 2022-12-21 RX ORDER — PSEUDOEPHEDRINE HCL 30 MG
200 TABLET ORAL NIGHTLY
Qty: 30 CAPSULE | Refills: 0
Start: 2022-12-21

## 2022-12-21 RX ORDER — HEPARIN SODIUM (PORCINE) LOCK FLUSH IV SOLN 100 UNIT/ML 100 UNIT/ML
500 SOLUTION INTRAVENOUS
Status: DISCONTINUED | OUTPATIENT
Start: 2022-12-21 | End: 2022-12-21 | Stop reason: HOSPADM

## 2022-12-21 RX ADMIN — TRAMADOL HYDROCHLORIDE 100 MG: 50 TABLET ORAL at 09:23

## 2022-12-21 RX ADMIN — Medication 4 ML: at 07:17

## 2022-12-21 RX ADMIN — METHIMAZOLE 5 MG: 5 TABLET ORAL at 09:19

## 2022-12-21 RX ADMIN — TIOTROPIUM BROMIDE INHALATION SPRAY 2 PUFF: 3.12 SPRAY, METERED RESPIRATORY (INHALATION) at 07:26

## 2022-12-21 RX ADMIN — PREDNISONE 20 MG: 20 TABLET ORAL at 09:21

## 2022-12-21 RX ADMIN — ESCITALOPRAM OXALATE 10 MG: 10 TABLET ORAL at 09:22

## 2022-12-21 RX ADMIN — ACYCLOVIR 800 MG: 800 TABLET ORAL at 09:18

## 2022-12-21 RX ADMIN — ALBUTEROL SULFATE 2.5 MG: 2.5 SOLUTION RESPIRATORY (INHALATION) at 07:17

## 2022-12-21 RX ADMIN — CLOPIDOGREL BISULFATE 75 MG: 75 TABLET ORAL at 09:19

## 2022-12-21 RX ADMIN — BUDESONIDE 500 MCG: 0.5 INHALANT RESPIRATORY (INHALATION) at 07:17

## 2022-12-21 RX ADMIN — TRAMADOL HYDROCHLORIDE 100 MG: 50 TABLET ORAL at 12:36

## 2022-12-21 RX ADMIN — PANTOPRAZOLE SODIUM 40 MG: 40 TABLET, DELAYED RELEASE ORAL at 09:21

## 2022-12-21 RX ADMIN — LOSARTAN POTASSIUM 25 MG: 25 TABLET, FILM COATED ORAL at 09:22

## 2022-12-21 RX ADMIN — SODIUM CHLORIDE, PRESERVATIVE FREE 10 ML: 5 INJECTION INTRAVENOUS at 09:35

## 2022-12-21 RX ADMIN — METOPROLOL SUCCINATE 100 MG: 100 TABLET, EXTENDED RELEASE ORAL at 09:17

## 2022-12-21 RX ADMIN — ASPIRIN 81 MG: 81 TABLET, CHEWABLE ORAL at 09:24

## 2022-12-21 RX ADMIN — GABAPENTIN 300 MG: 300 CAPSULE ORAL at 09:18

## 2022-12-21 RX ADMIN — GUAIFENESIN 1200 MG: 600 TABLET ORAL at 09:20

## 2022-12-21 RX ADMIN — ACETAMINOPHEN 650 MG: 325 TABLET, FILM COATED ORAL at 06:46

## 2022-12-21 ASSESSMENT — PAIN SCALES - GENERAL
PAINLEVEL_OUTOF10: 5
PAINLEVEL_OUTOF10: 4

## 2022-12-21 ASSESSMENT — PAIN DESCRIPTION - LOCATION
LOCATION: ABDOMEN;RIB CAGE
LOCATION: CHEST;RIB CAGE;BACK

## 2022-12-21 ASSESSMENT — PAIN DESCRIPTION - DESCRIPTORS
DESCRIPTORS: TENDER
DESCRIPTORS: BURNING;TENDER

## 2022-12-21 ASSESSMENT — PAIN DESCRIPTION - ORIENTATION: ORIENTATION: LEFT;RIGHT

## 2022-12-21 NOTE — TELEPHONE ENCOUNTER
12/21/22 - Spoke with pt; pt is being transferred today to a SNF Rehab. Advised pt to call navigation when she is discharged from Rehab, then we will schedule a f/u appt with Dr. Gordo Frank. Pt VU. Encouraged to call with questions. Navigation will continue to follow.

## 2022-12-21 NOTE — DISCHARGE SUMMARY
treatments severe COPD. Sputum cultures were drawn and patient was put on IV antibiotics for concern for bronchitis. Oncology was consulted with changes in CT findings no evidence of PE recommendation will follow-up for PET scan in the outpatient setting no clear evidence of new sites of disease. During this hospitalization patient was seen by palliative care with healthcare power of  and [place] post on file. Patient does desire to go to short-term rehab to gain strength in the outpatient setting. Inpatient rehab was consulted and gave strong recommendations for short-term rehab and that inpatient rehab would not be appropriate at this time. Patient was transferred from telemetry to medical floor for further titration of pulmonary medicines. Patient was accepted to [facility] in [place] with bed available on 12/21. Her last pulmonary the patient was down to 1 L/min via nasal cannula saturation of 96%. The day of discharge Pt's labs were stable. Pt was seen and examined by Dr. Hima Burciaga and determined stable and ready for discharge. Pt was instructed to follow discharge instructions given by nursing staff. DISPOSITION: The patient is being discharged home in stable condition on a low saturated fat, low cholesterol and low salt diet. Pt is instructed to advance activities as tolerated limited to fatigue or shortness of breath. Discharge Exam: BP (!) 119/51   Pulse 74   Temp 97.7 °F (36.5 °C) (Oral)   Resp 18   Ht 5' 5\" (1.651 m)   Wt 121 lb (54.9 kg)   SpO2 100%   BMI 20.14 kg/m²   Pt has been seen by Hima Burciaga MD: see his progress note for exam details.     Recent Results (from the past 24 hour(s))   COVID-19, Rapid    Collection Time: 12/20/22  2:24 PM    Specimen: Nasopharyngeal   Result Value Ref Range    Source NASAL      SARS-CoV-2, Rapid Not detected NOTD           Patient Instructions:     Current Discharge Medication List        START taking these medications    Details losartan (COZAAR) 25 MG tablet Take 1 tablet by mouth daily  Qty: 30 tablet, Refills: 3      docusate sodium (COLACE, DULCOLAX) 100 MG CAPS Take 200 mg by mouth nightly  Qty: 30 capsule, Refills: 0      polyethylene glycol (GLYCOLAX) 17 g packet Take 17 g by mouth daily as needed for Constipation  Qty: 527 g, Refills: 1           CONTINUE these medications which have CHANGED    Details   predniSONE (DELTASONE) 20 MG tablet Take 1 tablet by mouth 2 times daily for 10 days  Qty: 20 tablet, Refills: 0           CONTINUE these medications which have NOT CHANGED    Details   acyclovir (ZOVIRAX) 800 MG tablet Take 1 tablet by mouth 2 times daily  Qty: 60 tablet, Refills: 2    Associated Diagnoses: Herpes zoster with other complication      ondansetron (ZOFRAN) 8 MG tablet Take 1 tablet by mouth every 8 hours as needed for Nausea or Vomiting  Qty: 30 tablet, Refills: 2    Associated Diagnoses: Nausea; Palliative care patient      gabapentin (NEURONTIN) 100 MG capsule Take 3 capsules by mouth 3 times daily for 30 days. Qty: 90 capsule, Refills: 2    Associated Diagnoses: Neuropathic pain; Herpes zoster without complication; Palliative care patient      methIMAzole (TAPAZOLE) 10 MG tablet Take 0.5 tablets by mouth daily  Qty: 45 tablet, Refills: 3    Associated Diagnoses: Hyperthyroidism      Magic Mouthwash (MIRACLE MOUTHWASH) Swish and swallow 5 mLs 4 times daily as needed for Irritation Equal parts viscous lidocaine, maalox, nystatin, benadryl  Qty: 480 mL, Refills: 1      traMADol (ULTRAM) 50 MG tablet Take 2 tablets by mouth 4 times daily for 90 days. Z51.5 Palliative Care  Qty: 240 tablet, Refills: 2    Associated Diagnoses: Acute on chronic respiratory failure with hypoxia (HCC); COPD, very severe ([facility] [address]  Pulmonary mass; Palliative care patient      albuterol (PROVENTIL) (2.5 MG/3ML) 0.083% nebulizer solution TAKE 3 ML BY NEBULIZATION EVERY 6 HOURS AS NEEDED FOR SHORTNESS OF BREATH      tiotropium (SPIRIVA RESPIMAT) 2. 5 MCG/ACT AERS inhaler Inhale 2 puffs into the lungs in the morning. Qty: 1 each, Refills: 11      budesonide-formoterol (SYMBICORT) 160-4.5 MCG/ACT AERO Inhale 2 puffs into the lungs in the morning and 2 puffs before bedtime. Qty: 10.2 g, Refills: 11      OXYGEN Inhale into the lungs 2 LPM      lidocaine-prilocaine (EMLA) 2.5-2.5 % cream Apply topically as needed.   Qty: 30 g, Refills: 1    Associated Diagnoses: Non-small cell lung cancer, unspecified laterality (HCC)      ondansetron (ZOFRAN ODT) 4 MG disintegrating tablet Take 1 tablet by mouth every 8 hours as needed for Nausea or Vomiting  Qty: 90 tablet, Refills: 1    Associated Diagnoses: Non-small cell lung cancer, unspecified laterality (HCC)      prochlorperazine (COMPAZINE) 10 MG tablet Take 1 tablet by mouth every 6 hours as needed (chemo-induced nausea)  Qty: 90 tablet, Refills: 2    Associated Diagnoses: Non-small cell lung cancer, unspecified laterality (HCC)      Pseudoephedrine-guaiFENesin (MUCINEX D PO) Take by mouth in the morning and at bedtime      acetaminophen (TYLENOL) 325 MG tablet Take by mouth every 4 hours as needed      aspirin 81 MG chewable tablet Take 81 mg by mouth daily      atorvastatin (LIPITOR) 80 MG tablet Take 80 mg by mouth at bedtime TAKE 1 TABLET BY MOUTH DAILY      cetirizine (ZYRTEC) 10 MG tablet Take 10 mg by mouth daily as needed      clopidogrel (PLAVIX) 75 MG tablet Take 75 mg by mouth daily      escitalopram (LEXAPRO) 10 MG tablet Take 10 mg by mouth daily      ezetimibe (ZETIA) 10 MG tablet Take 10 mg by mouth at bedtime      metoprolol succinate (TOPROL XL) 100 MG extended release tablet Take 100 mg by mouth daily      nitroGLYCERIN (NITROSTAT) 0.4 MG SL tablet Place 0.4 mg under the tongue 3 times daily as needed      pantoprazole (PROTONIX) 40 MG tablet Take 40 mg by mouth 2 times daily      Roflumilast (DALIRESP) 500 MCG tablet Take 500 mcg by mouth nightly           STOP taking these medications HYDROcodone-acetaminophen (NORCO) 5-325 MG per tablet Comments:   Reason for Stopping:         ketorolac (TORADOL) 10 MG tablet Comments:   Reason for Stopping:         azithromycin (ZITHROMAX) 500 MG tablet Comments:   Reason for Stopping:         torsemide (DEMADEX) 10 MG tablet Comments:   Reason for Stopping:                 Signed:  YISEL White CNP  12/21/2022  10:38 AM

## 2022-12-21 NOTE — CARE COORDINATION
Pt to d/c today to [facility] for STR. Room: [facility]. Report: [phone]. Transport scheduled via [employer] for [phone]. No other supportive care needs identified. Pt to resume CA Tx s/p STR. Pt in agreement with d/c plan. Milestones met. LOS = 10 days       12/10/22 1419   Service Assessment   Patient Orientation Alert and Oriented;Person;Place;Situation;Self   Cognition Alert   History Provided By Patient;Medical Record   Primary Caregiver Self   Support Systems Spouse/Significant Other;Children;Family Members;[place];Friends/Neighbors   Patient's Healthcare Decision Maker is: Named in [address] [place] [address]   PCP Verified by CM Yes  Jennifer Bolden)   Last Visit to PCP Within last 6 months   Prior Functional Level Independent in ADLs/IADLs   Current Functional Level Independent in ADLs/IADLs   Can patient return to prior living arrangement Yes   Ability to make needs known: Good   Family able to assist with home care needs: Yes   Would you like for me to discuss the discharge plan with any other family members/significant others, and if so, who? Yes   Financial Resources Medicare   Social/Functional History   Lives With Significant other   Type of [address] One level   Home Equipment Cane;Walker, rolling;Oxygen   Receives Help From Family   ADL Assistance Independent   Ambulation Assistance Needs assistance   Transfer Assistance Independent   Active  No   Patient's  Info Family   Mode of Transportation Family   Occupation Retired   Discharge Planning   Type of [address]  ([facility])   Living Arrangements Spouse/Significant Other   Current Services Prior To Admission Durable Medical Equipment   Current DME Prior to [address] (Comment);Cane;Walker   [address]   DME Ordered?  No   Potential Assistance Purchasing Medications No   Type of Home Care Services None   Patient expects to be discharged to: Skilled nursing facility  ([address])   One/Two Story Residence One story   History of falls? 0   Services At/After Discharge   Transition of Care Consult (CM Consult) Discharge Planning;SNF; Transportation Assistance   Partner SNF Yes   [facility] Discharge Transport; In ambulance;Skilled [address] (SNF)   [employer] Information Provided? No   Mode of Transport at Discharge BLS  ([place])   [facility]   Confirm Follow Up Transport Other (see comment)  [facility])   Condition of Participation: Discharge Planning   The Plan for Transition of Care is related to the following treatment goals: Pt requires STR to return to functional baseline in the community. The Patient and/or Patient Representative was provided with a Choice of Provider? Patient   The Patient and/Or Patient Representative agree with the Discharge Plan? Yes   Freedom of Choice list was provided with basic dialogue that supports the patient's individualized plan of care/goals, treatment preferences, and shares the quality data associated with the providers?   Yes

## 2022-12-29 NOTE — TELEPHONE ENCOUNTER
Incoming call from patient. She is at [facility] in [place] she reports congestion. She reports SpO2 is 97% on 3 lpm. She reports no wheeze, she reports cough, she states thick secretions. Congestion is yellow. She is taking Mucinex. She is taking 20 mg Prednisone BID. She is taking Albuterol Q6 hours. She is asking about addition of nebulized medications she was taking in hospital-she was getting 3% saline and Pulmicort during admission. Seen by PPA during recent admission- h/o CAD s/p 15 stents, HTN, HLD, nsclc stage III (completed chemo/radiation, on immunotherapy), very severe COPD (evaluated and turned down for transplant x 2) with chronic hypoxic respiratory failure on 2L O2 continuously, and anxiety/depression    I have spoken with Dr Moriah Harris who approves addition of only Hypertonic Saline at this time. Patient voices appreciation and is aware that I will call [facility] to determine how to get this order to them. I have spoken with patient's nurse, Freida Hargrove. Request for order for hypertonic saline to be faxed to [facility] at [address] which has been done. Patient is aware to seek ED attention if needed.

## 2022-12-30 PROBLEM — J96.21 ACUTE ON CHRONIC RESPIRATORY FAILURE WITH HYPOXIA (HCC): Status: ACTIVE | Noted: 2022-01-01

## 2022-12-30 PROBLEM — J44.1 COPD EXACERBATION (HCC): Status: ACTIVE | Noted: 2022-01-01

## 2022-12-30 PROBLEM — R31.0 GROSS HEMATURIA: Status: ACTIVE | Noted: 2022-01-01

## 2022-12-30 PROBLEM — J96.20 ACUTE ON CHRONIC RESPIRATORY FAILURE (HCC): Status: RESOLVED | Noted: 2022-01-01 | Resolved: 2022-01-01

## 2022-12-30 NOTE — ED NOTES
Report given to Martell costello RN. Transferring patient care at this time.       Bessie Herrera RN  12/30/22 8948

## 2022-12-30 NOTE — ED TRIAGE NOTES
Chief Complaint: [Hematuria]     HPI: 67-year-old female presents with hematuria. He states that she had several episodes future today. Reports that she is on Plavix. Also reporting worsening of shortness of breath. States that she was admitted to the hospital and on BiPAP last week. She denies cough, fever, chills.      Vital Signs   Patient Vitals for the past 4 hrs:   Temp Pulse Resp BP SpO2   22 2353 98.2 °F (36.8 °C) 90 21 (!) 159/66 97 %        Past Medical History:   Diagnosis Date    Asthma     Closed right hip fracture ([facility] [address] 2022    Coronary artery disease     Former cigarette smoker     Fracture of right femur ([facility] [address] 2022    Hyperthyroidism     Lung mass     Multiple closed anterior-posterior compression fractures of pelvis ([facility] [address] 2022    Non-small cell lung cancer (HCC)     Severe chronic obstructive pulmonary disease (HCC)     Subdural hematoma 2022    Thyroid nodule         Past Surgical History:   Procedure Laterality Date    BRONCHOSCOPY N/A 2022    BRONCHOSCOPY ENDOBRONCHIAL ULTRASOUND performed by Ashley Palomino MD at [facility] ENDOSCOPY    CAROTID ENDARTERECTOMY Right     CATARACT REMOVAL Bilateral     CORONARY STENT PLACEMENT  2022    HIP FRACTURE SURGERY Right 2022    IR PORT PLACEMENT EQUAL OR GREATER THAN 5 YEARS  2022    IR PORT PLACEMENT EQUAL OR GREATER THAN 5 YEARS 2022 [facility]    WISDOM TOOTH EXTRACTION      as a teenager        Family History   Adopted: Yes   Problem Relation Age of Onset    No Known Problems Mother     No Known Problems Father     Thyroid Disease Daughter         Social History     Socioeconomic History    Marital status:      Spouse name: None    Number of children: None    Years of education: None    Highest education level: None   Tobacco Use    Smoking status: Former     Packs/day: 0.50     Years: 50.00     Pack years: 25.00     Types: Cigarettes     Quit date: 2017     Years since quittin.9 Smokeless tobacco: Never   Vaping Use    Vaping Use: Never used   Substance and Sexual Activity    Alcohol use: Not Currently    Drug use: Never    Sexual activity: Not Currently        Allergies: Promethazine    Previous Medications    ACETAMINOPHEN (TYLENOL) 325 MG TABLET    Take by mouth every 4 hours as needed    ACYCLOVIR (ZOVIRAX) 800 MG TABLET    Take 1 tablet by mouth 2 times daily    ALBUTEROL (PROVENTIL) (2.5 MG/3ML) 0.083% NEBULIZER SOLUTION    TAKE 3 ML BY NEBULIZATION EVERY 6 HOURS AS NEEDED FOR SHORTNESS OF BREATH    ASPIRIN 81 MG CHEWABLE TABLET    Take 81 mg by mouth daily    ATORVASTATIN (LIPITOR) 80 MG TABLET    Take 80 mg by mouth at bedtime TAKE 1 TABLET BY MOUTH DAILY    BUDESONIDE-FORMOTEROL (SYMBICORT) 160-4.5 MCG/ACT AERO    Inhale 2 puffs into the lungs in the morning and 2 puffs before bedtime. CETIRIZINE (ZYRTEC) 10 MG TABLET    Take 10 mg by mouth daily as needed    CLOPIDOGREL (PLAVIX) 75 MG TABLET    Take 75 mg by mouth daily    DOCUSATE SODIUM (COLACE, DULCOLAX) 100 MG CAPS    Take 200 mg by mouth nightly    ESCITALOPRAM (LEXAPRO) 10 MG TABLET    Take 10 mg by mouth daily    EZETIMIBE (ZETIA) 10 MG TABLET    Take 10 mg by mouth at bedtime    GABAPENTIN (NEURONTIN) 100 MG CAPSULE    Take 3 capsules by mouth 3 times daily for 30 days. LIDOCAINE-PRILOCAINE (EMLA) 2.5-2.5 % CREAM    Apply topically as needed.     LOSARTAN (COZAAR) 25 MG TABLET    Take 1 tablet by mouth daily    MAGIC MOUTHWASH (MIRACLE MOUTHWASH)    Swish and swallow 5 mLs 4 times daily as needed for Irritation Equal parts viscous lidocaine, maalox, nystatin, benadryl    METHIMAZOLE (TAPAZOLE) 10 MG TABLET    Take 0.5 tablets by mouth daily    METOPROLOL SUCCINATE (TOPROL XL) 100 MG EXTENDED RELEASE TABLET    Take 100 mg by mouth daily    NITROGLYCERIN (NITROSTAT) 0.4 MG SL TABLET    Place 0.4 mg under the tongue 3 times daily as needed    ONDANSETRON (ZOFRAN ODT) 4 MG DISINTEGRATING TABLET    Take 1 tablet by mouth every 8 hours as needed for Nausea or Vomiting    OXYGEN    Inhale into the lungs 2 LPM    PANTOPRAZOLE (PROTONIX) 40 MG TABLET    Take 40 mg by mouth 2 times daily    POLYETHYLENE GLYCOL (GLYCOLAX) 17 G PACKET    Take 17 g by mouth daily as needed for Constipation    PREDNISONE (DELTASONE) 20 MG TABLET    Take 1 tablet by mouth 2 times daily for 10 days    PROCHLORPERAZINE (COMPAZINE) 10 MG TABLET    Take 1 tablet by mouth every 6 hours as needed (chemo-induced nausea)    PSEUDOEPHEDRINE-GUAIFENESIN (MUCINEX D PO)    Take by mouth in the morning and at bedtime    ROFLUMILAST (DALIRESP) 500 MCG TABLET    Take 500 mcg by mouth nightly    SODIUM CHLORIDE, INHALANT, 3 % NEBULIZER SOLUTION    Take 4 mLs by nebulization in the morning and at bedtime    TIOTROPIUM (SPIRIVA RESPIMAT) 2.5 MCG/ACT AERS INHALER    Inhale 2 puffs into the lungs in the morning. TRAMADOL (ULTRAM) 50 MG TABLET    Take 2 tablets by mouth 4 times daily for 90 days. Z51.5 Palliative Care          Brief PE:  GEN: [No distress.]  CV: As per triage vitals  PULM: [Breathing comfortably]  ABD: [No distention]  NEURO: Jena Leaver, Alert]     Impression:  [ ]     Plan: [ ]    Patient evaluated initially in triage. Rapid Medical Evaluation was conducted and necessary orders have been placed. I have performed a medical screening exam.  Care will now be transferred to the provider in the back of the emergency department.   Concepcion Portillo, YISEL - LUCAS 2:09 AM

## 2022-12-30 NOTE — ED PROVIDER NOTES
Emergency Department Provider Note                  PCP:                Rob Avila MD               Age: 67 y.o. Sex: female       ICD-10-CM    1. COPD exacerbation ([facility]  J44.1       2. Acute respiratory distress  R06.03       3. Acute on chronic respiratory failure with hypoxia ([facility]  J96.21           DISPOSITION Admitted 12/30/2022 07:37:37 AM       MDM  Number of Diagnoses or Management Options  Acute on chronic respiratory failure with hypoxia ([facility]): established, worsening  Acute respiratory distress: new, needed workup  COPD exacerbation ([facility]): established, worsening     Amount and/or Complexity of Data Reviewed  Clinical lab tests: ordered and reviewed  Tests in the radiology section of CPT®: ordered and reviewed  Review and summarize past medical records: yes  Discuss the patient with other providers: yes    Risk of Complications, Morbidity, and/or Mortality  Presenting problems: high  Diagnostic procedures: high  Management options: high    Patient Progress  Patient progress: stable       ED Course as of 12/31/22 0530   Fri Dec 30, 2022   0320 Patient having increased respiratory distress receiving DuoNeb treatment. Patient placed on BiPAP. [CJ]   [id number] Chest x-ray  IMPRESSION:  No evidence of an acute intrathoracic process. [CJ]   0630 CMP unremarkable. Troponin 70.0. BNP 11,000. Lactic 1.1 Pro-Pete negative. Will give patient 20 mg of Lasix. [CJ]   0725 Patient admitted to pulmonology service.  [CJ]      ED Course User Index  [CJ] Dae Cardona, APRN - CNP        Orders Placed This Encounter   Procedures    Blood Culture 1    Blood Culture 2    Culture, Urine    XR CHEST PORTABLE    CBC with Auto Differential    Comprehensive Metabolic Panel    Magnesium    Troponin    Urinalysis w rflx microscopic    Lactate, Sepsis    Procalcitonin    Brain Natriuretic Peptide    Blood Gas, Arterial    Basic Metabolic Panel w/ Reflex to MG    CBC with Auto Differential    Blood Gas, Arterial    Blood Gas, Venous    Diet NPO    Continuous Pulse Oximetry    Vital signs per unit routine    Notify physician    Up with assistance    Daily weights    Intake and output    Elevate Head of Bed     Do Not Resuscitate    Inpatient consult to Palliative Care    IP WOUND CARE NURSE CONSULT TO EVAL    Inpatient consult to Palliative Care    Adult NIV/Positive Airway Pressure    Pulse Oximetry Spot Check    Arterial Blood Gas, POC    Venous Blood Gas, POC    Venous Blood Gas, POC    EKG 12 Lead    EKG 12 Lead    Saline lock IV    ADMIT TO INPATIENT        Medications   albuterol (PROVENTIL) nebulizer solution 2.5 mg (has no administration in time range)   aspirin chewable tablet 81 mg (81 mg Oral Given 12/30/22 1321)   atorvastatin (LIPITOR) tablet 80 mg (80 mg Oral Not Given 12/30/22 2151)   clopidogrel (PLAVIX) tablet 75 mg (75 mg Oral Given 12/30/22 1321)   docusate sodium (COLACE) capsule 200 mg (200 mg Oral Not Given 12/30/22 2150)   escitalopram (LEXAPRO) tablet 10 mg (10 mg Oral Given 12/30/22 1320)   ezetimibe (ZETIA) tablet 10 mg (10 mg Oral Not Given 12/30/22 2150)   losartan (COZAAR) tablet 25 mg (25 mg Oral Given 12/30/22 1321)   methIMAzole (TAPAZOLE) tablet 5 mg (5 mg Oral Given 12/30/22 1321)   metoprolol succinate (TOPROL XL) extended release tablet 100 mg (100 mg Oral Given 12/30/22 1321)   pantoprazole (PROTONIX) tablet 40 mg (40 mg Oral Not Given 12/30/22 2151)   sodium chloride flush 0.9 % injection 5-40 mL (10 mLs IntraVENous Given 12/30/22 2152)   sodium chloride flush 0.9 % injection 5-40 mL (has no administration in time range)   0.9 % sodium chloride infusion (has no administration in time range)   ondansetron (ZOFRAN-ODT) disintegrating tablet 4 mg ( Oral See Alternative 12/30/22 2130)     Or   ondansetron (ZOFRAN) injection 4 mg (4 mg IntraVENous Given 12/30/22 2130)   polyethylene glycol (GLYCOLAX) packet 17 g (has no administration in time range)   enoxaparin (LOVENOX) injection 40 mg (40 mg SubCUTAneous Given 12/30/22 1322)   acetaminophen (TYLENOL) tablet 650 mg (has no administration in time range)     Or   acetaminophen (TYLENOL) suppository 650 mg (has no administration in time range)   ipratropium-albuterol (DUONEB) nebulizer solution 1 ampule (1 ampule Inhalation Given 12/30/22 1932)   methylPREDNISolone sodium (SOLU-MEDROL) injection 40 mg (40 mg IntraVENous Given 12/31/22 0040)     Followed by   predniSONE (DELTASONE) tablet 40 mg (has no administration in time range)   azithromycin (ZITHROMAX) 500 mg in sodium chloride 0.9 % 250 mL IVPB ([id number]) (0 mg IntraVENous Stopped 12/30/22 1400)   cefTRIAXone (ROCEPHIN) 1,000 mg in sodium chloride 0.9 % 50 mL IVPB mini-bag (0 mg IntraVENous Stopped 12/30/22 0922)   budesonide (PULMICORT) nebulizer suspension 500 mcg (500 mcg Nebulization Given 12/30/22 1932)   LORazepam (ATIVAN) tablet 1 mg (1 mg Oral Not Given 12/30/22 2216)   metoprolol tartrate (LOPRESSOR) tablet 25 mg (25 mg Oral Not Given 12/30/22 2151)   morphine injection 2 mg (2 mg IntraVENous Given 12/31/22 0310)   Roflumilast (DALIRESP) tablet 500 mcg (Patient Supplied) (has no administration in time range)   amiodarone (CORDARONE) 450 mg in dextrose 5 % 250 mL infusion ([id number]) (1 mg/min IntraVENous New Bag 12/30/22 2346)     Followed by   amiodarone (CORDARONE) 450 mg in dextrose 5 % 250 mL infusion ([id number]) (has no administration in time range)   dexmedetomidine (PRECEDEX) 400 mcg in sodium chloride 0.9 % 100 mL infusion (0.6 mcg/kg/hr × 56.2 kg IntraVENous Rate/Dose Change 12/31/22 0310)   norepinephrine (LEVOPHED) 16 mg in sodium chloride 0.9 % 250 mL infusion (11 mcg/min IntraVENous Rate/Dose Change 12/31/22 0320)   ipratropium-albuterol (DUONEB) nebulizer solution 1 ampule (1 ampule Inhalation Given 12/30/22 0305)   morphine injection 4 mg (4 mg IntraVENous Given 12/30/22 0019)   piperacillin-tazobactam (ZOSYN) 4,500 mg in sodium chloride 0.9 % 100 mL IVPB (mini-bag) (0 mg IntraVENous Stopped 12/30/22 7470)   furosemide (LASIX) injection 20 mg (20 mg IntraVENous Given 12/30/22 0630)   morphine injection 2 mg (2 mg IntraVENous Given 12/30/22 0730)   morphine injection 2 mg (2 mg IntraVENous Given 12/30/22 2039)   morphine injection 2 mg (2 mg IntraVENous Given 12/30/22 1851)   metoprolol (LOPRESSOR) injection 5 mg (5 mg IntraVENous Given 12/30/22 2230)   0.9 % sodium chloride bolus (0 mLs IntraVENous Stopped 12/30/22 2323)       Current Discharge Medication List           Nelsy Shay is a 67 y.o. female who presents to the Emergency Department with chief complaint of    Chief Complaint   Patient presents with    Hematuria    Shortness of Breath      67year old female with history of COPD with oxygen dependence, small cell lung, CAD, hyperthyroidism, presents for hematuria. States that she was recently sent to a nursing facility and she noticed when she urinated this afternoon there was some blood. She states she does have some dysuria. She was admitted to the hospital last week for respiratory stress requiring BiPAP. She does note that she has had some increased shortness of breath over the last 2 days. She is typically on 2 L nasal cannula, however has been requiring 3 L at all time. She denies cough, fever, chills, nausea, vomiting, diarrhea. The history is provided by the patient.    Shortness of Breath  Severity:  Severe  Onset quality:  Sudden  Duration:  3 days  Timing:  Constant  Progression:  Worsening  Chronicity:  New  Context: activity    Context: not animal exposure, not URI and not weather changes    Relieved by:  Nothing  Worsened by:  Nothing  Ineffective treatments:  None tried  Associated symptoms: sputum production    Associated symptoms: no abdominal pain, no chest pain, no cough, no diaphoresis, no ear pain, no fever, no headaches, no rash, no sore throat and no vomiting    Risk factors: prolonged immobilization    Risk factors: no recent alcohol use and no obesity      All other systems reviewed and are negative unless otherwise stated in the history of present illness section. Review of Systems   Constitutional:  Negative for diaphoresis, fatigue and fever. HENT:  Negative for ear pain, rhinorrhea, sinus pressure, sinus pain, sore throat and trouble swallowing. Eyes:  Negative for pain, discharge and redness. Respiratory:  Positive for sputum production, chest tightness and shortness of breath. Negative for cough. Cardiovascular:  Negative for chest pain and palpitations. Gastrointestinal:  Negative for abdominal pain, nausea and vomiting. Genitourinary:  Negative for difficulty urinating, dysuria, flank pain, frequency, hematuria and urgency. Musculoskeletal:  Negative for arthralgias, back pain and myalgias. Skin:  Negative for color change, pallor, rash and wound. Neurological:  Negative for dizziness, weakness, numbness and headaches. Psychiatric/Behavioral:  Negative for agitation, behavioral problems and confusion. The patient is not nervous/anxious. All other systems reviewed and are negative.     Past Medical History:   Diagnosis Date    Asthma     Closed right hip fracture ([address] 02/2022    Coronary artery disease     Former cigarette smoker     Fracture of right femur ([address] 02/2022    Hyperthyroidism     Lung mass     Multiple closed anterior-posterior compression fractures of pelvis ([address] 02/2022    Non-small cell lung cancer ([address]     Severe chronic obstructive pulmonary disease ([address]     Subdural hematoma 03/2022    Thyroid nodule         Past Surgical History:   Procedure Laterality Date    BRONCHOSCOPY N/A 07/20/2022    BRONCHOSCOPY ENDOBRONCHIAL ULTRASOUND performed by Ros Chaudhari MD at [address] Right     CATARACT REMOVAL Bilateral     CORONARY STENT PLACEMENT  03/2022    HIP FRACTURE SURGERY Right 02/2022    IR PORT PLACEMENT EQUAL OR GREATER THAN 5 YEARS  08/05/2022 IR PORT PLACEMENT EQUAL OR GREATER THAN 5 YEARS 2022 [facility] SPE[facility]    WISDOM TOOTH EXTRACTION      as a teenager        Family History   Adopted: Yes   Problem Relation Age of Onset    No Known Problems Mother     No Known Problems Father     Thyroid Disease Daughter         Social History     Socioeconomic History    Marital status:      Spouse name: None    Number of children: None    Years of education: None    Highest education level: None   Tobacco Use    Smoking status: Former     Packs/day: 0.50     Years: 50.00     Pack years: 25.00     Types: Cigarettes     Quit date:      Years since quittin.0    Smokeless tobacco: Never   Vaping Use    Vaping Use: Never used   Substance and Sexual Activity    Alcohol use: Not Currently    Drug use: Never    Sexual activity: Not Currently        Allergies: Promethazine    Current Discharge Medication List        CONTINUE these medications which have NOT CHANGED    Details   sodium chloride, Inhalant, 3 % nebulizer solution Take 4 mLs by nebulization in the morning and at bedtime  Qty: 240 mL, Refills: 11      losartan (COZAAR) 25 MG tablet Take 1 tablet by mouth daily  Qty: 30 tablet, Refills: 3      predniSONE (DELTASONE) 20 MG tablet Take 1 tablet by mouth 2 times daily for 10 days  Qty: 20 tablet, Refills: 0      docusate sodium (COLACE, DULCOLAX) 100 MG CAPS Take 200 mg by mouth nightly  Qty: 30 capsule, Refills: 0      polyethylene glycol (GLYCOLAX) 17 g packet Take 17 g by mouth daily as needed for Constipation  Qty: 527 g, Refills: 1      gabapentin (NEURONTIN) 100 MG capsule Take 3 capsules by mouth 3 times daily for 30 days.   Qty: 90 capsule, Refills: 2    Associated Diagnoses: Neuropathic pain; Herpes zoster without complication; Palliative care patient      methIMAzole (TAPAZOLE) 10 MG tablet Take 0.5 tablets by mouth daily  Qty: 45 tablet, Refills: 3    Associated Diagnoses: Hyperthyroidism      Magic Mouthwash (MIRACLE MOUTHWASH) Swish and swallow 5 mLs 4 times daily as needed for Irritation Equal parts viscous lidocaine, maalox, nystatin, benadryl  Qty: 480 mL, Refills: 1      traMADol (ULTRAM) 50 MG tablet Take 2 tablets by mouth 4 times daily for 90 days. Z51.5 Palliative Care  Qty: 240 tablet, Refills: 2    Associated Diagnoses: Acute on chronic respiratory failure with hypoxia (HCC); COPD, very severe ([address] Pulmonary mass; Palliative care patient      albuterol (PROVENTIL) (2.5 MG/3ML) 0.083% nebulizer solution TAKE 3 ML BY NEBULIZATION EVERY 6 HOURS AS NEEDED FOR SHORTNESS OF BREATH      tiotropium (SPIRIVA RESPIMAT) 2.5 MCG/ACT AERS inhaler Inhale 2 puffs into the lungs in the morning. Qty: 1 each, Refills: 11      budesonide-formoterol (SYMBICORT) 160-4.5 MCG/ACT AERO Inhale 2 puffs into the lungs in the morning and 2 puffs before bedtime. Qty: 10.2 g, Refills: 11      OXYGEN Inhale into the lungs 2 LPM      lidocaine-prilocaine (EMLA) 2.5-2.5 % cream Apply topically as needed.   Qty: 30 g, Refills: 1    Associated Diagnoses: Non-small cell lung cancer, unspecified laterality (HCC)      ondansetron (ZOFRAN ODT) 4 MG disintegrating tablet Take 1 tablet by mouth every 8 hours as needed for Nausea or Vomiting  Qty: 90 tablet, Refills: 1    Associated Diagnoses: Non-small cell lung cancer, unspecified laterality (HCC)      prochlorperazine (COMPAZINE) 10 MG tablet Take 1 tablet by mouth every 6 hours as needed (chemo-induced nausea)  Qty: 90 tablet, Refills: 2    Associated Diagnoses: Non-small cell lung cancer, unspecified laterality (HCC)      Pseudoephedrine-guaiFENesin (MUCINEX D PO) Take by mouth in the morning and at bedtime      acetaminophen (TYLENOL) 325 MG tablet Take by mouth every 4 hours as needed      aspirin 81 MG chewable tablet Take 81 mg by mouth daily      atorvastatin (LIPITOR) 80 MG tablet Take 80 mg by mouth at bedtime TAKE 1 TABLET BY MOUTH DAILY      cetirizine (ZYRTEC) 10 MG tablet Take 10 mg by mouth daily as needed      clopidogrel (PLAVIX) 75 MG tablet Take 75 mg by mouth daily      escitalopram (LEXAPRO) 10 MG tablet Take 10 mg by mouth daily      ezetimibe (ZETIA) 10 MG tablet Take 10 mg by mouth at bedtime      metoprolol succinate (TOPROL XL) 100 MG extended release tablet Take 100 mg by mouth daily      nitroGLYCERIN (NITROSTAT) 0.4 MG SL tablet Place 0.4 mg under the tongue 3 times daily as needed      pantoprazole (PROTONIX) 40 MG tablet Take 40 mg by mouth 2 times daily      Roflumilast (DALIRESP) 500 MCG tablet Take 500 mcg by mouth nightly              Vitals signs and nursing note reviewed. Patient Vitals for the past 4 hrs:   Temp Pulse Resp BP SpO2   12/31/22 0435 -- -- 19 -- --   12/31/22 0340 -- -- 24 -- --   12/31/22 0324 97.6 °F (36.4 °C) 91 21 (!) 84/55 100 %   12/31/22 0323 -- 85 20 (!) 79/52 100 %   12/31/22 0319 -- 77 16 (!) 73/42 100 %   12/31/22 0303 -- 88 21 (!) 86/51 100 %   12/31/22 0245 -- 82 (!) 31 (!) 82/57 100 %   12/31/22 0230 -- 78 17 (!) 85/56 100 %   12/31/22 0215 -- 88 29 (!) 84/51 99 %   12/31/22 0210 -- 87 14 (!) 74/50 99 %   12/31/22 0205 -- 86 28 (!) 77/47 99 %   12/31/22 0200 -- 92 20 (!) 79/45 99 %   12/31/22 0155 -- 96 16 (!) 76/44 100 %   12/31/22 0150 -- 96 17 (!) 81/43 100 %   12/31/22 0145 -- 87 29 (!) 86/50 99 %   12/31/22 0140 -- (!) 119 23 (!) 82/58 99 %   12/31/22 0135 -- (!) 108 24 (!) 91/55 100 %          Physical Exam  Vitals and nursing note reviewed. Constitutional:       General: She is not in acute distress. Appearance: Normal appearance. She is not ill-appearing, toxic-appearing or diaphoretic. HENT:      Head: Normocephalic and atraumatic. Right Ear: Tympanic membrane and external ear normal. There is no impacted cerumen. Left Ear: Tympanic membrane and external ear normal. There is no impacted cerumen. Nose: Nose normal. No congestion or rhinorrhea.       Mouth/Throat:      Mouth: Mucous membranes are moist.      Pharynx: No oropharyngeal exudate or posterior oropharyngeal erythema. Eyes:      Extraocular Movements: Extraocular movements intact. Pupils: Pupils are equal, round, and reactive to light. Cardiovascular:      Rate and Rhythm: Normal rate and regular rhythm. Pulses: Normal pulses. Heart sounds: Normal heart sounds. No murmur heard. No friction rub. No gallop. Pulmonary:      Effort: Tachypnea and accessory muscle usage present. No respiratory distress. Breath sounds: No stridor. Wheezing present. No rhonchi or rales. Abdominal:      General: Abdomen is flat. Bowel sounds are normal. There is no distension. Palpations: Abdomen is soft. There is no mass. Tenderness: There is no abdominal tenderness. There is no guarding or rebound. Hernia: No hernia is present. Musculoskeletal:         General: No swelling, tenderness, deformity or signs of injury. Normal range of motion. Cervical back: Normal range of motion. No rigidity or tenderness. Right lower leg: No edema. Left lower leg: No edema. Skin:     General: Skin is warm and dry. Coloration: Skin is not jaundiced or pale. Findings: No bruising or erythema. Neurological:      General: No focal deficit present. Mental Status: She is alert and oriented to person, place, and time. Cranial Nerves: No cranial nerve deficit. Sensory: No sensory deficit. Motor: No weakness. Coordination: Coordination normal.   Psychiatric:         Mood and Affect: Mood normal.         Behavior: Behavior normal.         Thought Content:  Thought content normal.         Judgment: Judgment normal.        Procedures    ED EKG Interpretation  EKG was interpreted in the absence of a cardiologist.    Rate: 104  EKG Interpretation: EKG Interpretation: sinus rhythm  ST Segments: Nonspecific ST segments - NO STEMI    Results for orders placed or performed during the hospital encounter of 12/30/22   Blood Culture 1 Specimen: Blood   Result Value Ref Range    Special Requests PORT      Culture PENDING    XR CHEST PORTABLE    Narrative    EXAM: XR CHEST PORTABLE    HISTORY: SOB. TECHNIQUE: Frontal chest.    COMPARISON: 12/10/2022     FINDINGS:   The cardiac silhouette, mediastinum, and pulmonary vasculature are within normal  limits. There is no consolidation, pleural effusion, or pneumothorax. These chronic lung  changes. No significant osseous abnormalities are observed. Impression    No evidence of an acute intrathoracic process.      CBC with Auto Differential   Result Value Ref Range    WBC 9.5 4.3 - 11.1 K/uL    RBC 3.21 (L) 4.05 - 5.2 M/uL    Hemoglobin 10.5 (L) 11.7 - 15.4 g/dL    Hematocrit 32.6 (L) 35.8 - 46.3 %    .6 82 - 102 FL    MCH 32.7 26.1 - 32.9 PG    MCHC 32.2 31.4 - 35.0 g/dL    RDW 17.0 (H) 11.9 - 14.6 %    Platelets 304 453 - 490 K/uL    MPV 9.7 9.4 - 12.3 FL    nRBC 0.00 0.0 - 0.2 K/uL    Differential Type AUTOMATED      Seg Neutrophils 90 (H) 43 - 78 %    Lymphocytes 2 (L) 13 - 44 %    Monocytes 8 4.0 - 12.0 %    Eosinophils % 0 (L) 0.5 - 7.8 %    Basophils 0 0.0 - 2.0 %    Immature Granulocytes 0 0.0 - 5.0 %    Segs Absolute 8.5 (H) 1.7 - 8.2 K/UL    Absolute Lymph # 0.2 (L) 0.5 - 4.6 K/UL    Absolute Mono # 0.7 0.1 - 1.3 K/UL    Absolute Eos # 0.0 0.0 - 0.8 K/UL    Basophils Absolute 0.0 0.0 - 0.2 K/UL    Absolute Immature Granulocyte 0.0 0.0 - 0.5 K/UL   Comprehensive Metabolic Panel   Result Value Ref Range    Sodium 135 133 - 143 mmol/L    Potassium 4.0 3.5 - 5.1 mmol/L    Chloride 100 (L) 101 - 110 mmol/L    CO2 29 21 - 32 mmol/L    Anion Gap 6 2 - 11 mmol/L    Glucose 92 65 - 100 mg/dL    BUN 9 8 - 23 MG/DL    Creatinine 0.70 0.6 - 1.0 MG/DL    Est, Glom Filt Rate >60 >60 ml/min/1.73m2    Calcium 9.2 8.3 - 10.4 MG/DL    Total Bilirubin 0.5 0.2 - 1.1 MG/DL    ALT 23 12 - 65 U/L    AST 20 15 - 37 U/L    Alk Phosphatase 62 50 - 136 U/L    Total Protein 7.3 6.3 - 8.2 g/dL Albumin 3.1 (L) 3.2 - 4.6 g/dL    Globulin 4.2 2.8 - 4.5 g/dL    Albumin/Globulin Ratio 0.7 0.4 - 1.6     Magnesium   Result Value Ref Range    Magnesium 2.1 1.8 - 2.4 mg/dL   Troponin   Result Value Ref Range    Troponin, High Sensitivity 70.0 (H) 0 - 14 pg/mL   Urinalysis w rflx microscopic   Result Value Ref Range    Color, UA RED      Appearance TURBID      Specific Gravity, UA 1.015 1.001 - 1.023      pH, Urine 5.5 5.0 - 9.0      Protein,  (A) NEG mg/dL    Glucose, UA Negative mg/dL    Ketones, Urine Negative NEG mg/dL    Bilirubin Urine SMALL (A) NEG      Blood, Urine LARGE (A) NEG      Urobilinogen, Urine 0.2 0.2 - 1.0 EU/dL    Nitrite, Urine Positive (A) NEG      Leukocyte Esterase, Urine MODERATE (A) NEG      WBC, UA 0-3 0 /hpf    RBC, UA >100 0 /hpf    Epithelial Cells UA 3-5 0 /hpf    BACTERIA, URINE 0 0 /hpf    Other observations RESULTS VERIFIED MANUALLY     Lactate, Sepsis   Result Value Ref Range    Lactic Acid, Sepsis 1.1 0.4 - 2.0 MMOL/L   Procalcitonin   Result Value Ref Range    Procalcitonin <0.05 0.00 - 0.49 ng/mL   Brain Natriuretic Peptide   Result Value Ref Range    NT Pro-BNP 11,714 (H) 5 - 125 PG/ML   Basic Metabolic Panel w/ Reflex to MG   Result Value Ref Range    Sodium 138 133 - 143 mmol/L    Potassium 4.7 3.5 - 5.1 mmol/L    Chloride 102 101 - 110 mmol/L    CO2 29 21 - 32 mmol/L    Anion Gap 7 2 - 11 mmol/L    Glucose 136 (H) 65 - 100 mg/dL    BUN 30 (H) 8 - 23 MG/DL    Creatinine 1.20 (H) 0.6 - 1.0 MG/DL    Est, Glom Filt Rate 48 (L) >60 ml/min/1.73m2    Calcium 8.9 8.3 - 10.4 MG/DL   CBC with Auto Differential   Result Value Ref Range    WBC 9.8 4.3 - 11.1 K/uL    RBC 2.81 (L) 4.05 - 5.2 M/uL    Hemoglobin 9.1 (L) 11.7 - 15.4 g/dL    Hematocrit 29.8 (L) 35.8 - 46.3 %    .0 (H) 82 - 102 FL    MCH 32.4 26.1 - 32.9 PG    MCHC 30.5 (L) 31.4 - 35.0 g/dL    RDW 17.2 (H) 11.9 - 14.6 %    Platelets 365 260 - 431 K/uL    MPV 9.6 9.4 - 12.3 FL    nRBC 0.00 0.0 - 0.2 K/uL Differential Type AUTOMATED      Seg Neutrophils 96 (H) 43 - 78 %    Lymphocytes 1 (L) 13 - 44 %    Monocytes 3 (L) 4.0 - 12.0 %    Eosinophils % 0 (L) 0.5 - 7.8 %    Basophils 0 0.0 - 2.0 %    Immature Granulocytes 0 0.0 - 5.0 %    Segs Absolute 9.4 (H) 1.7 - 8.2 K/UL    Absolute Lymph # 0.1 (L) 0.5 - 4.6 K/UL    Absolute Mono # 0.3 0.1 - 1.3 K/UL    Absolute Eos # 0.0 0.0 - 0.8 K/UL    Basophils Absolute 0.0 0.0 - 0.2 K/UL    Absolute Immature Granulocyte 0.0 0.0 - 0.5 K/UL   Arterial Blood Gas, POC   Result Value Ref Range    DEVICE BIPAP MASK      FIO2 100 %    pH, Arterial, POC 7.31 (L) 7.35 - 7.45      pCO2, Arterial, POC 63.8 (HH) 35 - 45 MMHG    pO2, Arterial,  (H) 75 - 100 MMHG    HCO3, Mixed 31.8 (H) 22 - 26 MMOL/L    SO2c, Arterial, .0 (H) 95 - 98 %    Base Excess 3.8 mmol/L    Mode BIVENT      POC TIDAL VOLUME 674 ml    POC Rodger's Test Positive      Site RIGHT RADIAL      Specimen type: ARTERIAL      Performed by: Duyen Can    Venous Blood Gas, POC   Result Value Ref Range    PH, VENOUS (POC) 7.23 (L) 7.32 - 7.42      PCO2, [place], POC 74.2 (H) 41 - 51 MMHG    PO2, VENOUS (POC) 47 mmHg    HCO3, Venous 30.8 (H) 23 - 28 MMOL/L    SO2, VENOUS (POC) 71.9 65 - 88 %    BASE EXCESS, VENOUS (POC) 1.5 mmol/L    Specimen type: VENOUS BLOOD      Performed by: Eileen     Critical Value Read Back RN    Venous Blood Gas, POC   Result Value Ref Range    DEVICE BIPAP MASK      FIO2 40 %    PH, VENOUS (POC) 7.28 (L) 7.32 - 7.42      PCO2, [place], POC 67.9 (H) 41 - 51 MMHG    PO2, VENOUS (POC) 40 mmHg    HCO3, Venous 32.0 (H) 23 - 28 MMOL/L    SO2, VENOUS (POC) 65.5 65 - 88 %    BASE EXCESS, VENOUS (POC) 3.7 mmol/L    POC PEEP 6 cmH2O    IPAP/PIP/High PEEP 16      POC Pressure Support 10 cmH2O    POC Rodger's Test NOT APPLICABLE      Inspiratory Time 0.85 sec    Respiratory Rate 18      Site Pulmonary Artery      Specimen type: VENOUS BLOOD      Performed by: Jennifer Cruz     Critical Value Read Back CODY     Respiratory Comment: 8.7    EKG 12 Lead   Result Value Ref Range    Ventricular Rate 104 BPM    Atrial Rate 104 BPM    P-R Interval 128 ms    QRS Duration 84 ms    Q-T Interval 342 ms    QTc Calculation (Bazett) 449 ms    P Axis 104 degrees    R Axis 74 degrees    T Axis 91 degrees    Diagnosis       Sinus tachycardia with Premature supraventricular complexes and with   occasional Premature ventricular complexes     EKG 12 Lead   Result Value Ref Range    Ventricular Rate 120 BPM    Atrial Rate 144 BPM    QRS Duration 78 ms    Q-T Interval 372 ms    QTc Calculation (Bazett) 525 ms    R Axis 51 degrees    T Axis 103 degrees    Diagnosis       Atrial fibrillation with rapid ventricular response with premature   ventricular or aberrantly conducted complexes          XR CHEST PORTABLE   Final Result   No evidence of an acute intrathoracic process. Voice dictation software was used during the making of this note. This software is not perfect and grammatical and other typographical errors may be present. This note has not been completely proofread for errors.       Cecelia Padilla, YISEL - CNP  12/31/22 0530

## 2022-12-30 NOTE — ED NOTES
Called hard stick team to come draw second set of blood cultures.       Jose Francisco LOPEZ  12/30/22 7983

## 2022-12-30 NOTE — H&P
HISTORY AND 9 Krystal Fisher  12/30/2022   Date of Admission:  12/30/2022    The patient's chart is reviewed and the patient is discussed with the staff. Subjective:     Patient is a 67 y.o. female presents with a history of very severe COPD (evaluated and turned down for transplant at 2 centers), chronic hypoxic respiratory failure (on 2L at baseline), and stage III NSCLC (s/p chemo and radiation, on immunotherapy), CAD s/p multiple stents most recently in 2022 to LAD. She was just discharged 12/21 from a 2 week hospitalization for shortness of breath. Had chest pain at that time and was seen by cardiology with trop elevated and TTE with moderate to severe mitral regurgitation. Felt this was demand related troponin increase and no intervention was performed. She was treated with lasix and for COPD exacerbation, supported on bipap and discharged to short term rehab on 1L O2. She returned to the ER 12/30 with increased SOB. She currently had bipap on and other than saying she is very short of breath cannot get much history from her. ER notes also mention blood in her urine and UA with blood, nitrites, and leuk esterase but negative WBC and bacteria.      Review of Systems:  Comprehensive ROS negative except in HPI    Current Outpatient Medications   Medication Instructions    acetaminophen (TYLENOL) 325 MG tablet Oral, EVERY 4 HOURS PRN    acyclovir (ZOVIRAX) 800 mg, Oral, 2 TIMES DAILY    albuterol (PROVENTIL) (2.5 MG/3ML) 0.083% nebulizer solution TAKE 3 ML BY NEBULIZATION EVERY 6 HOURS AS NEEDED FOR SHORTNESS OF BREATH    aspirin 81 mg, Oral, DAILY    atorvastatin (LIPITOR) 80 mg, Oral, Nightly, TAKE 1 TABLET BY MOUTH DAILY    budesonide-formoterol (SYMBICORT) 160-4.5 MCG/ACT AERO 2 puffs, Inhalation, 2 TIMES DAILY    cetirizine (ZYRTEC) 10 mg, Oral, DAILY PRN    clopidogrel (PLAVIX) 75 mg, Oral, DAILY    docusate (COLACE, DULCOLAX) 200 mg, Oral, NIGHTLY    escitalopram (LEXAPRO) 10 mg, Oral, DAILY    ezetimibe (ZETIA) 10 mg, Oral, Nightly    gabapentin (NEURONTIN) 300 mg, Oral, 3 TIMES DAILY    lidocaine-prilocaine (EMLA) 2.5-2.5 % cream Apply topically as needed.     losartan (COZAAR) 25 mg, Oral, DAILY    Magic Mouthwash (MIRACLE MOUTHWASH) 5 mLs, Swish & Swallow, 4 TIMES DAILY PRN, Equal parts viscous lidocaine, maalox, nystatin, benadryl    methIMAzole (TAPAZOLE) 5 mg, Oral, DAILY    metoprolol succinate (TOPROL XL) 100 mg, Oral, DAILY    nitroGLYCERIN (NITROSTAT) 0.4 mg, SubLINGual, 3 TIMES DAILY PRN    ondansetron (ZOFRAN ODT) 4 mg, Oral, EVERY 8 HOURS PRN    OXYGEN Inhalation, 2 LPM    pantoprazole (PROTONIX) 40 mg, Oral, 2 TIMES DAILY    polyethylene glycol (GLYCOLAX) 17 g, Oral, DAILY PRN    predniSONE (DELTASONE) 20 mg, Oral, 2 TIMES DAILY    prochlorperazine (COMPAZINE) 10 mg, Oral, EVERY 6 HOURS PRN    Pseudoephedrine-guaiFENesin (MUCINEX D PO) Oral, 2 times daily    Roflumilast (DALIRESP) 500 mcg, Oral, NIGHTLY    sodium chloride, Inhalant, 3 % nebulizer solution 4 mLs, Nebulization, 2 times daily    tiotropium (SPIRIVA RESPIMAT) 2.5 MCG/ACT AERS inhaler 2 puffs, Inhalation, DAILY    traMADol (ULTRAM) 100 mg, Oral, 4 TIMES DAILY, Z51.5 Palliative Care      Past Medical History:   Diagnosis Date    Asthma     Closed right hip fracture ([facility] [address] 02/2022    Coronary artery disease     Former cigarette smoker     Fracture of right femur ([facility] [address] 02/2022    Hyperthyroidism     Lung mass     Multiple closed anterior-posterior compression fractures of pelvis ([facility] [address] 02/2022    Non-small cell lung cancer (HCC)     Severe chronic obstructive pulmonary disease (HCC)     Subdural hematoma 03/2022    Thyroid nodule      Past Surgical History:   Procedure Laterality Date    BRONCHOSCOPY N/A 07/20/2022    BRONCHOSCOPY ENDOBRONCHIAL ULTRASOUND performed by Jossy Villafana MD at [facility] ENDOSCOPY    CAROTID ENDARTERECTOMY Right CATARACT REMOVAL Bilateral     CORONARY STENT PLACEMENT  2022    HIP FRACTURE SURGERY Right 2022    IR PORT PLACEMENT EQUAL OR GREATER THAN 5 YEARS  2022    IR PORT PLACEMENT EQUAL OR GREATER THAN 5 YEARS 2022 [facility]    WISDOM TOOTH EXTRACTION      as a teenager     Social History     Socioeconomic History    Marital status:      Spouse name: Not on file    Number of children: Not on file    Years of education: Not on file    Highest education level: Not on file   Occupational History    Not on file   Tobacco Use    Smoking status: Former     Packs/day: 0.50     Years: 50.00     Pack years: 25.00     Types: Cigarettes     Quit date:      Years since quittin.9    Smokeless tobacco: Never   Vaping Use    Vaping Use: Never used   Substance and Sexual Activity    Alcohol use: Not Currently    Drug use: Never    Sexual activity: Not Currently   Other Topics Concern    Not on file   Social History Narrative    Not on file     Social Determinants of Health     Financial Resource Strain: Not on file   Food Insecurity: Not on file   Transportation Needs: Not on file   Physical Activity: Not on file   Stress: Not on file   Social Connections: Not on file   Intimate Partner Violence: Not on file   Housing Stability: Not on file     Family History   Adopted: Yes   Problem Relation Age of Onset    No Known Problems Mother     No Known Problems Father     Thyroid Disease Daughter      Allergies   Allergen Reactions    Promethazine Nausea And Vomiting and Other (See Comments)     Severe vomiting      Objective:     Vitals:    22 0600 22 0615 22 0630 22 0645   BP: (!) 160/75 135/71 136/82 (!) 172/71   Pulse: (!) 103 97 95 97   Resp: 23 20 20 18   Temp:       TempSrc:       SpO2: 100% 100% 100% 100%   Weight:         PHYSICAL EXAM   Constitutional:  the patient is well developed and in no acute distress  EENMT:  Sclera clear, pupils equal, oral mucosa moist  Respiratory: symmetric chest rise. Decreased but with some mild exp wheeze. Cardiovascular:  RRR without M,G,R. There is trace B lower extremity edema. Gastrointestinal: soft and non-tender; with positive bowel sounds. Musculoskeletal: warm without cyanosis. Normal muscle tone. Skin:  no jaundice or rashes, no wounds   Neurologic: symmetric strength, fluent speech  Psychiatric:  calm, appropriate, oriented x 4    Imaging: I performed an independent interpretation of the patient's images. CXR:   12/30/22      No results found for this or any previous visit. Results for orders placed during the hospital encounter of 12/09/22    CT CHEST PULMONARY EMBOLISM W CONTRAST    Narrative  CT OF THE CHEST WITH INTRAVENOUS CONTRAST, 12/9/2022    Indication: Chest pain and shortness of breath. Non-small cell lung cancer. Comparison: CT chest with contrast 11/16/2022    Technique:   2.5 mm axial scans from above the aortic arch to the lung bases  following the uneventful administration of 70 mL of Isovue-370. Intravenous  contrast was given to evaluate for pulmonary embolism. All CT scans performed at  this facility use one or all of the following: Automated exposure control,  adjustment of the mA and/or kVp according to patient's size, iterative  reconstruction. Findings: The base of the neck is unremarkable in appearance. Although there is no clear  evolving adenopathy, there is a right hilar lymph node now seen on axial image  51 which is increased in size now measuring 9 mm and previously measuring 6 mm  in size. The thoracic aorta is normal in caliber. Opacification of the  pulmonary arteries is good. No abnormal filling defects are seen to suggest  pulmonary embolism. Evaluation with lung windows demonstrates advanced of edematous changes of the  lungs. The patient's known treated lesion is now seen on axial image 71. This  demonstrates increasing soft tissue density at multiple levels. Medially,  increased soft tissue density seen extending to the left hilum best appreciated  on axial image 67. Lateral, there is an increasing soft tissue component best  appreciated on axial image 70 which currently measures 14 mm and previously  measured 11 mm in size. Progression of the patient's primary neoplasm is not  excluded. No evolving suspicious lesion is otherwise seen. Only stable 5 mm left  upper lobe nodule is now seen on axial image 41. No pleural effusion is seen. Lungs are expanded without evidence for pneumothorax. No acute osseous  abnormality is seen. Limited evaluation of the upper abdomen demonstrates no acute abnormality. Impression  1. No evidence for pulmonary embolism, or acute thoracic process otherwise. Only advanced emphysematous changes are seen of the lungs. 2. Multiple suspicious findings in relation to the patient's known neoplasm. There are increasing soft tissue components associated with the left perihilar  lesion and there is an enlarging right hilar lymph node although this is not  clearly enlarged at this time. Disease progression cannot be excluded given  these findings. The patient should be immediately referred back to her  oncologist or instructed to immediately follow up with her oncologist to assess  these potentially worrisome changes. LAB:  Recent Labs     12/30/22 0437   WBC 9.5   HGB 10.5*   HCT 32.6*        Recent Labs     12/30/22 0437      K 4.0   *   CO2 29   BUN 9   CREATININE 0.70   MG 2.1   BILITOT 0.5   AST 20   ALT 23   ALKPHOS 62     Recent Labs     12/30/22 0437   PROCAL <0.05   TROPHS 70.0*   NTPROBNP 11,714*     No results for input(s): PHAPOC, BFR7ENBF, OL1HPGS, MJG2DEL, BE in the last 72 hours. Microbiology:   Recent Labs     12/30/22 0437   CULTURE PENDING     Assessment and Plan:  (Medical Decision Making)   Impression: Pt with baseline very severe COPD, not a transplant candidate, on 2-3L at baseline.  Recent admission for SOB. Now back with the same. Active Problems:    Non-small cell lung cancer ([facility] [address]  Plan: s/p chemo/rads. On immunotherapy. Last CT scan 12/9 with increasing components concerning for disease progression. Plan was for outpt PET scan and oncology follow up. CXR stable today. Cont to monitor. Acute on chronic respiratory failure with hypoxia (HCC)  Plan: no abg performed. On bipap. Will get one now. COPD exacerbation ([facility] [address]  Plan: mild wheeze on exam but poor air movement. IV steroids, duoneb, pulmicort, prn albuterol. Received zosyn in the ER. No reported infectious symptoms. Will start azithromycin. Personal history of tobacco use  Plan: contributes to the cause of her disease. DNR (do not resuscitate)  Plan: discussed with her and  last admission. Unable to discuss with respiratory distress and bipap. Will readdress with them. Has been seen by outpatient and inpatient palliative care already. May be helpful to involve them now as well to discuss recurrent hospitalizations and symptom management option. Nonrheumatic mitral valve regurgitation  Plan: Moderate to severe on recent TTE. Not a candidate for interventions. Hematuria: UA not overly suggestive of UTI. Will get urine cultures. Ceftriaxone for now. More than 50% of the time documented was spent in face-to-face contact with the patient and in the care of the patient on the floor/unit where the patient is located. Kasey Alvarado MD    Dictated using voice recognition software.   Proof read but unrecognized errors may exist.

## 2022-12-30 NOTE — ED NOTES
TRANSFER - OUT REPORT:    Verbal report given to Franral Dance on [address]  being transferred to [facility] for routine progression of patient care       Report consisted of patient's Situation, Background, Assessment and   Recommendations(SBAR). Information from the following report(s) ED Encounter Summary was reviewed with the receiving nurse. [place] Assessment: Presents to emergency department  because of falls (Syncope, seizure, or loss of consciousness): No, Age > 79: Yes, Altered Mental Status, Intoxication with alcohol or substance confusion (Disorientation, impaired judgment, poor safety awaremess, or inability to follow instructions): No, Impaired Mobility: Ambulates or transfers with assistive devices or assistance; Unable to ambulate or transer.: Yes  Lines:   Single Lumen Implantable Port 08/05/22 Right Subclavian (Active)        Opportunity for questions and clarification was provided.       Patient transported with:  O2 @ 15lpm RN         Brennon Situ, RN  12/30/22 2598

## 2022-12-30 NOTE — ED TRIAGE NOTES
Pt to ED with c/o blood in urine. Pt states started as just a drop or two of blood when she wiped. Pt states she starting having a pressure so the nurses came in to help her and noticed a blood clot in her under wear. Pt states increased shortness of breath over the past few days. Pt states typically on 2L but increased to 3L due to shortness of breath. Pt states has a cough that started 2 days ago. Pt states takes plavix daily. Pt denies pain or difficulty urinating. Pt denies chest pain. Pt states has hx of copd and stage 3 lung cancer. Pt states was recently admitted and placed on bipap. Pt states currently at [facility] and [facility] for rehab.

## 2022-12-31 NOTE — PROGRESS NOTES
Patient sustaining hr 150s-180s. MD Casas notified. Patient was unable to take PO lopressor dt not wanting to take off her bipap mask. Orders received for 5mg Lopressor IV one time. Warm

## 2022-12-31 NOTE — PROGRESS NOTES
[facility] Critical Care Note[de-identified] 12/31/2022  [facility]  Admission Date: 12/30/2022     Length of Stay: 1 days    Background: 67 y.o. female with very severe COPD (evaluated and turned down for transplant at 2 centers), chronic hypoxic respiratory failure (on 2L at baseline), and stage III NSCLC (s/p chemo and radiation, on immunotherapy), CAD s/p multiple stents most recently in 2022 to LAD. She was just discharged 12/21 from a 2 week hospitalization for shortness of breath. Had chest pain at that time and was seen by cardiology with trop elevated and TTE with moderate to severe mitral regurgitation. Felt this was demand related troponin increase and no intervention was performed. She was treated with lasix and for COPD exacerbation, supported on bipap and discharged to short term rehab on 1L O2. She returned to the ER 12/30 with increased SOB. She currently had bipap on and other than saying she is very short of breath cannot get much history from her. ER notes also mention blood in her urine and UA with blood, nitrites, and leuk esterase but negative WBC and bacteria. Notable PMH:  has a past medical history of Asthma, Closed right hip fracture ([address] Coronary artery disease, Former cigarette smoker, Fracture of right femur ([address] Hyperthyroidism, Lung mass, Multiple closed anterior-posterior compression fractures of pelvis ([address] Non-small cell lung cancer ([address] Severe chronic obstructive pulmonary disease ([address] Subdural hematoma, and Thyroid nodule. 24 Hour events: last evening developed afib, received lopressor PO then developed hypotension and placed on amio, levophed. Remains on BIPAP. Family gathered overnight and is considering comfort today once son arrives per notes. Review of Systems: Unable to obtain due to patient factors.      Lines: (insertion date)     Single Lumen Implantable Port 08/05/22 Right Subclavian (Active)     Drips: current dose (range)  Dose (mcg/kg/hr) Dexmedetomidine: 0.6 mcg/kg/hr  Dose (mcg/min) Norepinephrine: 11 mcg/min (map 61)  Dose (mg/min) Amiodarone: 0.5 mg/min     Pertinent Exam:         Blood pressure (!) 140/59, pulse 59, temperature 97.6 °F (36.4 °C), temperature source Axillary, resp. rate 14, height 5' 5\" (1.651 m), weight 119 lb 11.4 oz (54.3 kg), SpO2 100 %. Intake/Output Summary (Last 24 hours) at 12/31/2022 5628  Last data filed at 12/31/2022 7395  Gross per 24 hour   Intake 578.12 ml   Output 800 ml   Net -221.88 ml     Constitutional:  elderly, chronically ill appearing  EENMT:  Sclera clear, pupils equal, oral mucosa moist  Respiratory: coarse BS on BIPAP, tachypneic  Cardiovascular:  RRR  Gastrointestinal:  soft with no tenderness; positive bowel sounds present  Musculoskeletal:  warm with no cyanosis, no lower extremity edema  Skin:  no jaundice or ecchymosis  Neurologic: sleeping/sedated  Psychiatric: calm    CXR: mild basilar infiltrates/edema, hyperinflation. Recent Labs     12/30/22 0437 12/31/22 0311   WBC 9.5 9.8   HGB 10.5* 9.1*   HCT 32.6* 29.8*    200   PROCAL <0.05  --      Recent Labs     12/30/22 0437 12/31/22 0311    138   K 4.0 4.7   * 102   CO2 29 29   GLUCOSE 92 136*   BUN 9 30*   CREATININE 0.70 1.20*   MG 2.1  --    BILITOT 0.5  --    AST 20  --    ALT 23  --    ALKPHOS 62  --      Recent Labs     12/30/22 0437   TROPHS 70.0*   NTPROBNP 11,714*     Recent Labs     12/30/22 0437 12/31/22 0311   GLUCOSE 92 136*      ECHO: 12/09/22    TRANSTHORACIC ECHOCARDIOGRAM (TTE) COMPLETE (CONTRAST/BUBBLE/3D PRN) 12/10/2022  7:40 PM (Final)    Interpretation Summary    Left Ventricle: Low normal left ventricular systolic function. EF by 2D Simpsons Biplane is 52%. Left ventricle size is normal. Normal wall thickness. Normal wall motion. Normal diastolic function. Right Ventricle: Right ventricle size is normal. Normal systolic function. Aortic Valve: Tricuspid valve. Mildly thickened cusps. Moderate regurgitation. AV PHT is 384.0 ms. No stenosis. Mitral Valve: Mildly thickened leaflet, at the anterior and posterior leaflets. Moderate to severe regurgitation. Tricuspid Valve: Mild to moderate regurgitation. The estimated RVSP is 42 mmHg. Right Atrium: Right atrium size is normal.  Echodense structure noted in the right atrium may represent indwelling central venous catheter. IVC/SVC: IVC diameter is less than or equal to 21 mm and decreases greater than 50% during inspiration; therefore the estimated right atrial pressure is normal (~3 mmHg). Technical qualifiers: Technically difficult study, color flow Doppler was performed and pulse wave and/or continuous wave Doppler was performed. Signed by: London Davis DO on 12/10/2022  7:40 PM    Microbiology:   Recent Labs     12/30/22  0437 12/30/22  1751   CULTURE NO GROWTH 1 DAY NO GROWTH AFTER 11 HOURS     Ventilator Settings Ideal body weight: 57 kg (125 lb 10.6 oz)  Mode FIO2 Rate Tidal Volume Pressure        40 %                  Peak airway pressure:     Minute ventilation:    ABG:  Recent Labs     12/30/22  0822   PHAPOC 7.31*   URH4VYDL 63.8*   TM3FTRF 558*   XJA8GCY 31.8*   BE 3.8     Assessment and Plan:  (Medical Decision Making)   Impression: 67 y.o. female with end stage COPD, lung cancer on immunotherapy, valvular heart disease admitted with acute on chronic hypoxemic and hypercapneic respiratory failure. She continued to worsen overnight and now on pressors, precedex, amio gtt and BIPAP. NEURO:   Agitation/anxiety: precedex  CV:   Volume Status: does not appear overloaded despite elevated BNP  Shock: No fevers, WBC normal. Doubt septic. Seemed to be related to afib with severe underlying cardiopulmonary disease complicated by medications (precedex, amio)  Valvular heart disease: not surgical candidate, monitor volume status closely, strict I/O. No diuretics for now on 11 levophed.    Afib: new on amio at 0.5 and now in sinus, would need AC if aggressive care continued  PULM:   Acute on chronic hypoxemic/hypercapneic respiratory failure:  continue BIPAP, getting precedex for anxiety. COPD exacerbation: very severe, on IV steroids, nebs, azithro. We could repeat ABG if family doesn't pursue comfort measures only or if would help them make their decision. RENAL:  ANGIE: new, due to shock. Pressor requirement is improving now as she is back in sinus  Electrolytes: follow  GI:   Nutrition: NPO on BIPAP  HEME:   Anemia: moderate, follow  Anticoagulation: would need full AC for afib if aggressive care continued. ID:   Shock: doubt infection, but has been started on CAP coverage with respiratory failure and now with shock. Follow up cultures. Not able to get sputum culture. ENDO:   Skin: no decub, turns, preventive care  Prophy: Lovenox, Protonix     was sleeping at bedside. Did not wake him, but can meet with family later when they gather. DNR    The patient is critically ill with respiratory failure, circulatory failure and requires high complexity decision making for assessment and support including frequent ventilator adjustment, frequent evaluation and titration of therapies , application of advanced monitoring technologies and extensive interpretation of multiple databases    Cumulative time devoted to patient care services by me for day of service is 30 mins.     Andrew Briones MD

## 2022-12-31 NOTE — PROGRESS NOTES
Patient noticed to have intermittent rapid irregular rhythm, 12-lead EKG obtained showing a-fib rvr w/ pvc's. MD Casas notified, received orders for 25mg lopressor PO one time.

## 2022-12-31 NOTE — PROGRESS NOTES
Patient on V60 and connected to nursing monitoring including Continuous Pulse Oximetry. V60 plugged into central monitoring system. Alarms are activated and nurse has been notified. Documentation completed.

## 2022-12-31 NOTE — PROGRESS NOTES
Met with family at bedside, , son and daughter. She continues to be in distress with breathing even on BIPAP, precedex with intermittent morphine pushes. She has not been comfortable with breathing since immunotherapy and has severe cardiopulmonary disease at baseline. They have all agreed to pursue comfort care at this time. Discussed plan with them and nurse for plan.      Travis Muir MD

## 2022-12-31 NOTE — PROGRESS NOTES
Patients family at bedside, given update on patients condition. Family is wishing to pursue comfort measures once patients estranged son arrives in town tomorrow. Consult to palliative care placed.

## 2023-01-01 VITALS
WEIGHT: 119.71 LBS | HEART RATE: 105 BPM | OXYGEN SATURATION: 93 % | DIASTOLIC BLOOD PRESSURE: 52 MMHG | SYSTOLIC BLOOD PRESSURE: 110 MMHG | HEIGHT: 65 IN | BODY MASS INDEX: 19.94 KG/M2 | TEMPERATURE: 99.9 F | RESPIRATION RATE: 16 BRPM

## 2023-01-01 LAB
BACTERIA SPEC CULT: NORMAL
SERVICE CMNT-IMP: NORMAL

## 2023-01-01 PROCEDURE — 6360000002 HC RX W HCPCS: Performed by: INTERNAL MEDICINE

## 2023-01-01 PROCEDURE — 2500000003 HC RX 250 WO HCPCS: Performed by: INTERNAL MEDICINE

## 2023-01-01 PROCEDURE — 99232 SBSQ HOSP IP/OBS MODERATE 35: CPT | Performed by: INTERNAL MEDICINE

## 2023-01-01 PROCEDURE — 2580000003 HC RX 258: Performed by: INTERNAL MEDICINE

## 2023-01-01 RX ADMIN — MORPHINE SULFATE 2 MG: 2 INJECTION, SOLUTION INTRAMUSCULAR; INTRAVENOUS at 16:22

## 2023-01-01 RX ADMIN — LORAZEPAM 2 MG: 2 INJECTION INTRAMUSCULAR; INTRAVENOUS at 02:04

## 2023-01-01 RX ADMIN — LORAZEPAM 2 MG: 2 INJECTION INTRAMUSCULAR; INTRAVENOUS at 20:22

## 2023-01-01 RX ADMIN — MORPHINE SULFATE 2 MG: 2 INJECTION, SOLUTION INTRAMUSCULAR; INTRAVENOUS at 10:16

## 2023-01-01 RX ADMIN — MORPHINE SULFATE 2 MG: 2 INJECTION, SOLUTION INTRAMUSCULAR; INTRAVENOUS at 03:01

## 2023-01-01 RX ADMIN — LORAZEPAM 2 MG: 2 INJECTION INTRAMUSCULAR; INTRAVENOUS at 03:51

## 2023-01-01 RX ADMIN — MORPHINE SULFATE 2 MG: 2 INJECTION, SOLUTION INTRAMUSCULAR; INTRAVENOUS at 05:26

## 2023-01-01 RX ADMIN — MORPHINE SULFATE 2 MG: 2 INJECTION, SOLUTION INTRAMUSCULAR; INTRAVENOUS at 20:22

## 2023-01-01 RX ADMIN — LORAZEPAM 2 MG: 2 INJECTION INTRAMUSCULAR; INTRAVENOUS at 17:44

## 2023-01-01 RX ADMIN — LORAZEPAM 2 MG: 2 INJECTION INTRAMUSCULAR; INTRAVENOUS at 15:09

## 2023-01-01 RX ADMIN — MORPHINE SULFATE 2 MG: 2 INJECTION, SOLUTION INTRAMUSCULAR; INTRAVENOUS at 06:19

## 2023-01-01 RX ADMIN — MORPHINE SULFATE 2 MG: 2 INJECTION, SOLUTION INTRAMUSCULAR; INTRAVENOUS at 03:51

## 2023-01-01 RX ADMIN — MORPHINE SULFATE 2 MG: 2 INJECTION, SOLUTION INTRAMUSCULAR; INTRAVENOUS at 17:43

## 2023-01-01 RX ADMIN — LORAZEPAM 2 MG: 2 INJECTION INTRAMUSCULAR; INTRAVENOUS at 03:02

## 2023-01-01 RX ADMIN — LORAZEPAM 2 MG: 2 INJECTION INTRAMUSCULAR; INTRAVENOUS at 10:16

## 2023-01-01 RX ADMIN — MORPHINE SULFATE 2 MG: 2 INJECTION, SOLUTION INTRAMUSCULAR; INTRAVENOUS at 15:09

## 2023-01-01 RX ADMIN — LORAZEPAM 2 MG: 2 INJECTION INTRAMUSCULAR; INTRAVENOUS at 16:22

## 2023-01-01 RX ADMIN — LORAZEPAM 2 MG: 2 INJECTION INTRAMUSCULAR; INTRAVENOUS at 05:26

## 2023-01-01 RX ADMIN — MORPHINE SULFATE 2 MG: 2 INJECTION, SOLUTION INTRAMUSCULAR; INTRAVENOUS at 07:50

## 2023-01-01 RX ADMIN — LORAZEPAM 2 MG: 2 INJECTION INTRAMUSCULAR; INTRAVENOUS at 06:19

## 2023-01-01 RX ADMIN — GLYCOPYRROLATE 0.1 MG: 0.2 INJECTION, SOLUTION INTRAMUSCULAR; INTRAVENOUS at 20:42

## 2023-01-01 RX ADMIN — LORAZEPAM 2 MG: 2 INJECTION INTRAMUSCULAR; INTRAVENOUS at 07:50

## 2023-01-01 RX ADMIN — SODIUM CHLORIDE, PRESERVATIVE FREE 10 ML: 5 INJECTION INTRAVENOUS at 20:45

## 2023-01-01 RX ADMIN — LORAZEPAM 2 MG: 2 INJECTION INTRAMUSCULAR; INTRAVENOUS at 04:34

## 2023-01-01 RX ADMIN — MORPHINE SULFATE 2 MG: 2 INJECTION, SOLUTION INTRAMUSCULAR; INTRAVENOUS at 04:34

## 2023-01-01 RX ADMIN — MORPHINE SULFATE 2 MG: 2 INJECTION, SOLUTION INTRAMUSCULAR; INTRAVENOUS at 02:04

## 2023-01-01 RX ADMIN — SODIUM CHLORIDE, PRESERVATIVE FREE 10 ML: 5 INJECTION INTRAVENOUS at 15:09

## 2023-01-01 NOTE — PROGRESS NOTES
Patient resting with eyes closed. Respirations labored but no distress noted, RR 10. Family requesting morphine and ativan as soon as the patient can have it, per son \"please make her go soon, end her suffering\". Patient appears to be in no pain nor distress. Instructed family on how to utilize these medicines and let her go peacefully. Patient members with different opinions about meds and will discuss among them. For right now will continue treating as requested.

## 2023-01-01 NOTE — PROGRESS NOTES
TRANSFER - IN REPORT:    Verbal report received from [facility] on [facility]  being received from CCU for change in patient condition (Comfort Care)      Report consisted of patient's Situation, Background, Assessment and   Recommendations(SBAR). Information from the following report(s) Nurse Handoff Report, Adult Overview, and MAR was reviewed with the receiving nurse. Opportunity for questions and clarification was provided. Assessment completed upon patient's arrival to unit and care assumed.

## 2023-01-01 NOTE — CARE COORDINATION
ASSESSMENT NOTE    Attending Physician: Madeline Phalen, MD  Admit Problem: Acute respiratory distress [R06.03]  COPD exacerbation ([address] [J44.1]  Acute on chronic respiratory failure with hypoxia (HCC) [J96.21]  Date/Time of Admission: 12/30/2022  2:06 AM  Problem List:  Patient Active Problem List   Diagnosis    Essential hypertension, benign    Ventricular ectopy    Iron deficiency anemia due to chronic blood loss    Carotid stenosis, right    Carotid stenosis    Right ear pain    Pulmonary mass    Chronic anxiety    Coronary artery disease involving native coronary artery of native heart without angina pectoris    Chronic respiratory failure with hypoxia (HCC)    Personal history of tobacco use    COPD, very severe ([address]    History of MI (myocardial infarction)    H/O carotid endarterectomy    DNR (do not resuscitate)    Acute bilateral low back pain without sciatica    Centrilobular emphysema (HCC)    CAD S/P percutaneous coronary angioplasty    Palliative care patient    Ischemic heart disease    Hypertensive urgency    Hyperlipidemia    Hypoxemia    Nonrheumatic mitral valve regurgitation    Hypokalemic alkalosis    Depression    DDD (degenerative disc disease), lumbar    Anxiety    Adenopathy    Non-small cell lung cancer ([address]    Hyperthyroidism    Thyroid nodule    Dyspnea    Shingles    Acute on chronic respiratory failure with hypoxia (HCC)    Debility    Fatigue    Encounter for palliative care    Chronic mucus hypersecretion, respiratory    COPD exacerbation ([address]    Gross hematuria       Service Assessment  Patient Orientation Unable to Assess   Cognition Other (see comment)   History Provided By [facility], Medical Record   Primary Caregiver Family   Accompanied By/Relationship Ex SpouseSgriceldadawn Clayxena  [phone]   Support Systems Spouse/Significant Other, Children, Family Members   Patient's Healthcare Decision Maker is: Named in [address] [place] [address] Avenue   PCP Verified by CM Yes   Last Visit to PCP Within last 6 months   Prior Functional Level Assistance with the following:, Mobility   Current Functional Level Assistance with the following:, Mobility   Can patient return to prior living arrangement No   Ability to make needs known: Poor   Family able to assist with home care needs: Yes   Would you like for me to discuss the discharge plan with any other family members/significant others, and if so, who? Yes   Financial Resources Medicare   Community Resources     CM/SW Referral       Social/Functional History  Lives With Significant other   Type of [facility] One level   Home Access     Entrance Stairs - Number of Steps     Entrance Stairs - Rails     Bathroom Shower/Tub     Bathroom Toilet     Bathroom Equipment     Bathroom Accessibility     Home Equipment [place], Lorren Barer, rolling, [address] Help From [address] [facility] [address] Work     Driving     Shopping          Other (Comment)     [address] [employer] Paying/Finance [address] [facility] Management     Other (Comment)     Ambulation Assistance Needs assistance   Transfer Assistance Independent   Active  No   Patient's  Info Family   Mode of Transportation Family   Education     Occupation Retired   Type of Occupation       Discharge Planning   Type of Residence [employer]   Living Arrangements Spouse/Significant Other   Support Systems Spouse/Significant Other, Children, Family Members   Current Services Prior To Admission     Potential Assistance Needed     DME     DME     DME Ordered?      Potential Assistance Purchasing Medications No   Meds-to-Beds: Does the patient want to have any new prescriptions delivered to bedside prior to discharge? Type of Home Care Services     Patient expects to be discharged to: Follow Up Appointment: Best Day/Time     One/Two Story Residence:     # of Interior Steps     Height of Each Step (in)     [employer] Available     History of Falls? Services At/After Discharge  Transition of Care Consult (CM Consult): Discharge Planning   Internal Home Health     Internal Hospice     Reason Outside Agency [address]     Partner SNF     Reason Why Partner SNF Not Chosen     Internal Comfort Care     Reason Outside [address] Discharge None    Resource Information Provided? No   Mode of Transport at Discharge [address] Time of Discharge     Confirm Follow Up Transport Other (see comment)     Condition of Participation: Discharge Planning  The plan for Transition of Care is related to the following treatment goals: comfort measures   The Patient and/or Patient Representative was provided with a Choice of Provider? Patient Representative   Name of the Patient Representative who was provided with the Choice of Provider and agrees with the Discharge Plan? Stephen Sharp   The Patient and/or Patient Representative Agree with the Discharge Plan? Yes   Freedom of Choice list was provided with basic dialogue that supports the individualized plan of care/goals, treatment preferences, and shares the quality data associated with the providers?  Yes     Documentation for Discharge Appeal  Discharge Appealed by     Date notified by QIO of appeal request:     Time notified by QIO of appeal request:     Detailed Notice of Discharge given to:     Date Notice of Discharge given:     Time Notice of Discharge given:     Date records sent to 2 Ruadama Tillman     Time records sent to 2 Ruadama Tillman     Date Notified of Outcome     Time Notified of Outcome     Outcome of appeal           Dick Gilbert RN 01/01/23 3:04 PM

## 2023-01-01 NOTE — PROGRESS NOTES
[address] [place] [address] reviewed notification from RN that PT was transitioning to [address] Family requested spiritual support. PT appeared to be resting comfortably d/t current med.reg. Daughter and Spouse were at bedside. Family was tearful and appeared to be experiencing appropriate grief. Family engaged in life review. [address] [place] [address] offered comforting spiritual presence, active listening, therapeutic communication, and hospitality. Family spoke of PT with deep affection. PT was originally from [place]. Family expressed PT love of Family including dogs. Spouse and PT were  for 24 years. Family expressed gratitude for care PT received in hospital. Family expressed importance of elfego to PT and Family. Family expressed great comfort in knowing PT is with God and that PT will be greeted by PT's loved ones in heaven. PT is Adventist. Spouse is Evangelical/Gnosticism and Daughter is Non-Episcopalian [place]. Family expressed importance and comfort in prayer.  offered prayer including traditional Adventist prayers and said a blessing in [place].  ended prayer with [address]. [address] [place] [address] checked for unmet needs.  offered additional support and let Family know Family and PT are in [facility] prayers. [address] [place] [address] thanked Family for letting [address] [place] [address] know PT and expressed 's condolences. Rev. Steph Beckwith M.Div.

## 2023-01-01 NOTE — PROGRESS NOTES
Patient arrived to floor from ICU via hospital bed. Patient noted to with labored breathing. On 2L NC. Unresponsive to voice. Minimal response to pain. Daughter arrived to bedside. Discussed plan of comfort during the night. Daughter voiced understanding and voiced appreciation. Patient noted with gross blood in thomas cath. Scattered ecchymosis to skin. Alleyvn to sacrum.

## 2023-01-01 NOTE — PROGRESS NOTES
Sadia Call [address]  Admission Date: 12/30/2022         Daily Progress Note: 1/1/2023    The patient's chart is reviewed and the patient is discussed with the staff. Background: 67 y.o. female with very severe COPD (evaluated and turned down for transplant at 2 centers), chronic hypoxic respiratory failure (on 2L at baseline), and stage III NSCLC (s/p chemo and radiation, on immunotherapy), CAD s/p multiple stents most recently in 2022 to LAD. She was just discharged 12/21 from a 2 week hospitalization for shortness of breath. Had chest pain at that time and was seen by cardiology with trop elevated and TTE with moderate to severe mitral regurgitation. Felt this was demand related troponin increase and no intervention was performed. She was treated with lasix and for COPD exacerbation, supported on bipap and discharged to short term rehab on 1L O2. She returned to the ER 12/30 with increased SOB. She currently had bipap on and other than saying she is very short of breath cannot get much history from her. ER notes also mention blood in her urine and UA with blood, nitrites, and leuk esterase but negative WBC and bacteria. Subjective:     Pt was made comfort care and moved out of the ICU yesterday. Pt is resting comfortably in bed, unresponsive. Slow, even respiratory rate. Family at bedside. Voicing some concerns about this taking as long as it has and wondering if anything else can be done to speed her remaining time. Reviewed the goals and limitation of comfort care approach to alleviating discomfort but not speeding death. Reviewed that she appears very comfortable right now and not in any distress. Discussed the inevitability of some agonal breathing as death approaches and the possibility of more time before she passes.      Current Facility-Administered Medications   Medication Dose Route Frequency    morphine injection 2 mg  2 mg IntraVENous Q15 Min PRN    LORazepam (ATIVAN) injection 2 mg  2 mg IntraVENous Q15 Min PRN    glycopyrrolate (ROBINUL) injection 0.1 mg  0.1 mg IntraVENous Q6H PRN    scopolamine (TRANSDERM-SCOP) transdermal patch 1 patch  1 patch TransDERmal Q72H    albuterol (PROVENTIL) nebulizer solution 2.5 mg  2.5 mg Nebulization Q4H PRN    sodium chloride flush 0.9 % injection 5-40 mL  5-40 mL IntraVENous 2 times per day    sodium chloride flush 0.9 % injection 5-40 mL  5-40 mL IntraVENous PRN    ondansetron (ZOFRAN-ODT) disintegrating tablet 4 mg  4 mg Oral Q8H PRN    Or    ondansetron (ZOFRAN) injection 4 mg  4 mg IntraVENous Q6H PRN     Review of Systems: Unable to obtain due to patient factors. Objective:   Blood pressure (!) 94/40, pulse (!) 102, temperature 98.2 °F (36.8 °C), temperature source Oral, resp. rate (!) 8, height 5' 5\" (1.651 m), weight 119 lb 11.4 oz (54.3 kg), SpO2 93 %. Intake/Output Summary (Last 24 hours) at 1/1/2023 1206  Last data filed at 1/1/2023 0731  Gross per 24 hour   Intake --   Output 850 ml   Net -850 ml     Physical Exam:   Constitutional:  the patient is well developed and in no acute distress  EENMT:  Sclera clear, pupils equal, oral mucosa moist  Respiratory: symmetric chest rise. Cta b, no w/r/r  Cardiovascular:  RRR without M,G,R. There is no lower extremity edema. Gastrointestinal: soft and non-tender; with positive bowel sounds. Musculoskeletal: warm without cyanosis. Normal muscle tone. Skin:  no jaundice or rashes, no wounds   Neurologic: symmetric strength, fluent speech  Psychiatric:  calm, appropriate, oriented x 4    Imaging: I performed an independent interpretation of the patient's images.   CXR:   No new imaging    LAB:  Recent Labs     12/30/22 0437 12/31/22 0311   WBC 9.5 9.8   HGB 10.5* 9.1*   HCT 32.6* 29.8*    200   PROCAL <0.05  --      Recent Labs     12/30/22  0437 12/31/22  0311    138   K 4.0 4.7   * 102   CO2 29 29   BUN 9 30*   CREATININE 0.70 1.20*   MG 2.1  --    BILITOT 0.5  --    AST 20  --    ALT 23  --    ALKPHOS 62  --      Recent Labs     12/30/22 0437   TROPHS 70.0*   NTPROBNP 11,714*     Recent Labs     12/30/22 0437 12/31/22  0311   GLUCOSE 92 136*      Microbiology:   Recent Labs     12/30/22 0437 12/30/22  0514 12/30/22  1751   CULTURE NO GROWTH 2 DAYS 76066 COLONIES/mL MIXED SKIN JAY ISOLATED NO GROWTH 2 DAYS     ECHO: 12/09/22    TRANSTHORACIC ECHOCARDIOGRAM (TTE) COMPLETE (CONTRAST/BUBBLE/3D PRN) 12/10/2022  7:40 PM (Final)    Interpretation Summary    Left Ventricle: Low normal left ventricular systolic function. EF by 2D Simpsons Biplane is 52%. Left ventricle size is normal. Normal wall thickness. Normal wall motion. Normal diastolic function. Right Ventricle: Right ventricle size is normal. Normal systolic function. Aortic Valve: Tricuspid valve. Mildly thickened cusps. Moderate regurgitation. AV PHT is 384.0 ms. No stenosis. Mitral Valve: Mildly thickened leaflet, at the anterior and posterior leaflets. Moderate to severe regurgitation. Tricuspid Valve: Mild to moderate regurgitation. The estimated RVSP is 42 mmHg. Right Atrium: Right atrium size is normal.  Echodense structure noted in the right atrium may represent indwelling central venous catheter. IVC/SVC: IVC diameter is less than or equal to 21 mm and decreases greater than 50% during inspiration; therefore the estimated right atrial pressure is normal (~3 mmHg). Technical qualifiers: Technically difficult study, color flow Doppler was performed and pulse wave and/or continuous wave Doppler was performed. Signed by: Robby Sánchez DO on 12/10/2022  7:40 PM    Assessment and Plan:  (Medical Decision Making)   Active Problems:    Pt with end stage COPD and lung cancer leading to acute on chronic respiratory failure. Continue current comfort care measures. Would not be surprised if patient passes away today. Counseling and support given to her family.          More than 50% of the time documented was spent in face-to-face contact with the patient and in the care of the patient on the floor/unit where the patient is located.     Pleasant Boxer, MD

## 2023-01-01 NOTE — PROGRESS NOTES
Report called to receiving RN. Patient's daughter @ bedside and is planning to stay with the patient tonight. Patient's family advised they have chosen a  home, when ready. Contact information is: [facility]. [address] Phone: [phone].

## 2023-01-02 NOTE — PROGRESS NOTES
Shen removed with balloon completely deflated and tip intact. Pt's ring given to daughter. All belongings given to family.  home identified pt and has left with her body.

## 2023-01-02 NOTE — PROGRESS NOTES
-Daughter alerted nurse that patient may not be breathing. -Nurse to room. No respirations noted. No heart sound ausculted. -Dr Vance notified patient   -House supervisor alerted.

## 2023-01-02 NOTE — PROGRESS NOTES
END OF SHIFT NOTE:   Patient continues to in agonal breathing but not distress. Son, upset about dying process taking longer \"than expected\" and upset about her \"suffering\" even though patient appears to be calm in no distress. Son has reported several times about disagreeing with the care been provided. Ex  and POA at bedside conveys been satisfactory with current care. Son has requested to speak with MD again and Dr Carlos Eduardo Dow has come back to talk to him but missed him. Patient resting with eyes closed. INTAKE/OUTPUT  12/31 0701 - 01/01 0700  In: 10 [I.V.:10]  Out: 1350 [Urine:1350]  Voiding: No  Catheter: Yes  Drain:   Urinary Catheter 12/31/22 Shen (Active)   Catheter Indications Comfort for end of life care, if needed 01/01/23 0730   Site Assessment No urethral drainage 01/01/23 0251   Urine Color Bloody 01/01/23 0730   Urine Appearance Clear 01/01/23 0730   Collection Container Standard 01/01/23 0730   Securement Method Securing device (Describe) 01/01/23 0730   Catheter Care Completed Yes 01/01/23 0251   Catheter Best Practices  Drainage tube clipped to bed;Catheter secured to thigh; Tamper seal intact; Bag below bladder;Bag not on floor; Lack of dependent loop in tubing;Drainage bag less than half full 01/01/23 0251   Status Patent;Draining 01/01/23 0251   Output (mL) 500 mL 01/01/23 0251               Flatus: Patient does not have flatus present. Stool: 0  occurrences. Characteristics:  Stool Appearance: Watery, Loose  Stool Color: Brown  Stool Amount: Large  Stool Assessment  Incontinence: Yes  Stool Appearance: Watery, Loose  Stool Color: Brown  Stool Amount: Large  Stool Source: Rectum  Last BM (including prior to admit): 12/31/22    Emesis:  occurrences.     Characteristics:        VITAL SIGNS  Patient Vitals for the past 12 hrs:   Pulse Resp BP SpO2   01/01/23 1115 (!) 102 (!) 8 (!) 94/40 93 %   01/01/23 0745 -- (!) 8 -- --       Pain Assessment  Pain Level: 7 (12/31/22 0955)  Pain Location: Back  Patient's Stated Pain Goal: 0 - No pain    Ambulating  No, Patient on comfort care. Shift report given to oncoming nurse at the bedside. Genevieve Yu Setting

## 2023-01-04 NOTE — PROGRESS NOTES
Physician Progress Note      PATIENT:               Rustam EDMONDS #:                  [id number]  :                       1950  ADMIT DATE:       2022 2:06 AM  DISCH DATE:        2023 10:06 PM  RESPONDING  PROVIDER #:        Tamara Montoya MD        QUERY TEXT:    Type of Shock: Please provide further specificity, if known. Clinical indicators include: blood, hgb, hct, volume status, hock, septic, due   to, shock  Options provided:  -- Cardiogenic shock  -- Septic shock  -- Hypovolemic shock  -- Hemorrhagic shock due to trauma  -- Hemorrhagic shock related to surgery  -- Anaphylactic shock  -- Other - I will add my own diagnosis  -- Disagree - Not applicable / Not valid  -- Disagree - Clinically Unable to determine / Unknown        PROVIDER RESPONSE TEXT:    The patient had cardiogenic shock related to afib with RVR. This was further complicated by sedation medications.       Electronically signed by:  Tamara Montoya MD 2023 11:32 AM

## 2023-03-08 NOTE — PROGRESS NOTES
END OF SHIFT NOTE:  Comfort Care    INTAKE/OUTPUT  12/31 0701 - 01/01 0700  In: 10 [I.V.:10]  Out: 1350 [Urine:1350]  Voiding: Yes  Catheter: Yes  Drain:   Urinary Catheter 12/31/22 Shen (Active)   Catheter Indications Urinary retention (acute or chronic), continuous bladder irrigation or bladder outlet obstruction 01/01/23 0251   Site Assessment No urethral drainage 01/01/23 0251   Urine Color Bloody 01/01/23 0251   Collection Container Standard 01/01/23 0251   Securement Method Securing device (Describe) 01/01/23 0251   Catheter Care Completed Yes 01/01/23 0251   Catheter Best Practices  Drainage tube clipped to bed;Catheter secured to thigh; Tamper seal intact; Bag below bladder;Bag not on floor; Lack of dependent loop in tubing;Drainage bag less than half full 01/01/23 0251   Status Patent;Draining 01/01/23 0251   Output (mL) 500 mL 01/01/23 0251               Flatus: Patient does have flatus present. Stool:  occurrences. Characteristics:  Stool Appearance: Watery, Loose  Stool Color: Brown  Stool Amount: Large  Stool Assessment  Incontinence: Yes  Stool Appearance: Watery, Loose  Stool Color: Brown  Stool Amount: Large  Stool Source: Rectum  Last BM (including prior to admit): 12/31/22    Emesis:  occurrences.     Characteristics:        VITAL SIGNS  Patient Vitals for the past 12 hrs:   Temp Pulse Resp BP SpO2   01/01/23 0619 -- -- 10 -- --   01/01/23 0430 -- -- 10 -- --   01/01/23 0251 98.7 °F (37.1 °C) 90 27 122/62 --   01/01/23 0200 -- 94 27 (!) 105/51 (!) 70 %   01/01/23 0130 -- 89 27 (!) 146/62 (!) 70 %   01/01/23 0100 -- 88 30 (!) 136/59 (!) 71 %   01/01/23 0030 -- 79 25 (!) 143/65 (!) 79 %   01/01/23 0000 98.4 °F (36.9 °C) 72 26 (!) 116/56 (!) 81 %   12/31/22 2330 -- 70 24 (!) 101/54 (!) 81 %   12/31/22 2300 98.8 °F (37.1 °C) 68 20 (!) 93/50 (!) 83 %   12/31/22 2230 -- 68 23 (!) 105/55 (!) 84 %   12/31/22 2200 -- 66 22 (!) 99/53 (!) 86 %   12/31/22 2130 -- 65 20 (!) 96/54 (!) 86 %   12/31/22 2100 -- Impression: Nexdtve age-related mclr degn, bilateral, intermed dry stage: H35.3132. Plan: Exam and OCT confirm the presence of RPE changes and drusen and subfovea drusenoid PED vs vitelliform material consistent with Dry AMD. The diagnosis, natural history, and prognosis of dry AMD were discussed at length. The patient was instructed on the importance of taking AREDS2 vitamins and informed that smoking increases the risk of blindness for this disease. The patient was educated on the proper use of the Amsler grid and encouraged to use it daily to monitor the vision in each eye. The patient was instructed to call our office immediately upon any decreased vision or increased distortion. 

RTC: 4 months OCT OU 64 11 (!) 85/49 (!) 88 %   12/31/22 2030 -- 64 11 (!) 81/46 (!) 88 %   12/31/22 2000 98.8 °F (37.1 °C) 65 11 (!) 78/45 (!) 86 %   12/31/22 1930 -- 65 11 (!) 73/42 (!) 84 %   12/31/22 1915 -- 65 12 (!) 74/39 (!) 85 %   12/31/22 1900 -- 64 12 (!) 72/38 (!) 87 %       Pain Assessment  Pain Level: 7 (12/31/22 2187)  Pain Location: Back  Patient's Stated Pain Goal: 0 - No pain    Ambulating  No    Shift report given to oncoming nurse at the bedside.     Gaetano Maurice, RN
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
## (undated) DEVICE — INTENDED FOR TISSUE SEPARATION, AND OTHER PROCEDURES THAT REQUIRE A SHARP SURGICAL BLADE TO PUNCTURE OR CUT.: Brand: BARD-PARKER SAFETY BLADES SIZE 10, STERILE

## (undated) DEVICE — NEEDLE ASPIR 21GA L700MM US GUID TREAT DST END FOR EFFICIENT

## (undated) DEVICE — RUNTHROUGH NS EXTRA FLOPPY PTCA GUIDEWIRE: Brand: RUNTHROUGH

## (undated) DEVICE — MICROCATHETER: Brand: MAMBA™ FLEX

## (undated) DEVICE — GUIDEWIRE VASC STR 3 CM 0.014 INX300 CM HI TORQ WHISPER MS

## (undated) DEVICE — SOLUTION IV 1000ML 0.9% SOD CHL

## (undated) DEVICE — REM POLYHESIVE ADULT PATIENT RETURN ELECTRODE: Brand: VALLEYLAB

## (undated) DEVICE — Z DUPLICATE USE 2103554 VALVE HEMOSTATIC BLEEDBK CTRL COPILOT

## (undated) DEVICE — GARMENT,MEDLINE,DVT,INT,CALF,MED, GEN2: Brand: MEDLINE

## (undated) DEVICE — CATHETER GUID EXTRA BACKUP 3.5 0.070IN 6FR 100CM VISTA BRITE TIP

## (undated) DEVICE — GUIDEWIRE 035IN 210CM PTFE COAT FIX COR J TIP 15MM FIRM BODY

## (undated) DEVICE — DRAPE SHT 3 QTR PROXIMA 53X77 --

## (undated) DEVICE — BIPOLAR FORCEPS CORD: Brand: VALLEYLAB

## (undated) DEVICE — TUBING FLUIDICS BRONCHOSCOPE

## (undated) DEVICE — DRAPE,U/SHT,SPLIT,FILM,60X84,STERILE: Brand: MEDLINE

## (undated) DEVICE — SET EXTN L6IN W/ S STL CLMP

## (undated) DEVICE — SPONGE GZ W4XL4IN COT 12 PLY TYP VII WVN C FLD DSGN

## (undated) DEVICE — BRONCHOSCOPY PACK: Brand: MEDLINE INDUSTRIES, INC.

## (undated) DEVICE — NEEDLE HYPO 25GA L1.5IN BLU POLYPR HUB S STL REG BVL STR

## (undated) DEVICE — PTCA DILATATION CATHETER: Brand: NC QUANTUM APEX™

## (undated) DEVICE — INTENDED TO BE USED TO OCCLUDE, RETRACT AND IDENTIFY ARTERIES, VEINS, TENDONS AND NERVES IN SURGICAL PROCEDURES: Brand: STERION®  VESSEL LOOP

## (undated) DEVICE — SUTURE PERMAHAND SZ 2-0 L12X18IN NONABSORBABLE BLK SILK A185H

## (undated) DEVICE — GOWN,PREVENTION PLUS,2XL,ST,22/CS: Brand: MEDLINE

## (undated) DEVICE — SUTURE PROL SZ 6-0 L24IN NONABSORBABLE BLU L13MM C-1 3/8 8726H

## (undated) DEVICE — COVER,MAYO STAND,STERILE: Brand: MEDLINE

## (undated) DEVICE — GUIDE WIRE, BALL-TIPPED, STERILE

## (undated) DEVICE — BOWL 32OZ-LF: Brand: MEDLINE INDUSTRIES, INC.

## (undated) DEVICE — 2000CC GUARDIAN II: Brand: GUARDIAN

## (undated) DEVICE — INTENDED FOR TISSUE SEPARATION, AND OTHER PROCEDURES THAT REQUIRE A SHARP SURGICAL BLADE TO PUNCTURE OR CUT.: Brand: BARD-PARKER SAFETY BLADES SIZE 15, STERILE

## (undated) DEVICE — CAROTID ARTERY SHUNT KIT,RADIOPAQUE LINE, STRAIGHT: Brand: ARGYLE

## (undated) DEVICE — SUTURE PERMAHAND SZ 3-0 L18IN NONABSORBABLE BLK SILK BRAID A184H

## (undated) DEVICE — GUIDE EXTENSION CATHETER: Brand: GUIDEZILLA™ II

## (undated) DEVICE — COVER,LIGHT HANDLE,FLX,2/PK: Brand: MEDLINE INDUSTRIES, INC.

## (undated) DEVICE — GEL US 20GM NONIRRITATING OVERWRAPPED FILE PCH TRNSMIT

## (undated) DEVICE — APPLICATOR BNDG 1MM ADH PREMIERPRO EXOFIN

## (undated) DEVICE — CLAMP INSERT: Brand: STEALTH® FIBRA® CLAMP INSERT

## (undated) DEVICE — SYRINGE IRRIG 60ML SFT PLIABLE BLB EZ TO GRP 1 HND USE W/

## (undated) DEVICE — APPLIER CLP L9.38IN M LIG TI DISP STR RNG HNDL LIGACLP

## (undated) DEVICE — KIT PROC W/ 90DEG FIRM TIP EXT WRK CHN LOCATABLE GUID FOR

## (undated) DEVICE — SOL INJ SOD CL0.9% 10ML PREFIL --

## (undated) DEVICE — PTCA DILATATION CATHETER: Brand: EMERGE™

## (undated) DEVICE — APPLIER CLP L9.375IN APER 2.1MM CLS L3.8MM 20 SM TI CLP

## (undated) DEVICE — SUTURE VCRL SZ 4-0 L27IN ABSRB UD L19MM PS-2 3/8 CIR PRIM J426H

## (undated) DEVICE — BD NRML NACL FLSH SYR

## (undated) DEVICE — BRUSH CYTO L120MM DIA1.7MM SHARPENED NDL TIP FWD FACING

## (undated) DEVICE — REAMER SHAFT, MOD.TRINKLE: Brand: BIXCUT

## (undated) DEVICE — 3M™ IOBAN™ 2 ANTIMICROBIAL INCISE DRAPE 6650EZ: Brand: IOBAN™ 2

## (undated) DEVICE — CONNECTOR VENT EL DBL SWVL 15M 22M 15F

## (undated) DEVICE — Device: Brand: BALLOON APPLICATOR

## (undated) DEVICE — KIT INTRODUCER BRONCHOSCOPE PATIENT

## (undated) DEVICE — UNIVERSAL DRAPES: Brand: MEDLINE INDUSTRIES, INC.

## (undated) DEVICE — CLOTH PRE OP W9XL10.5IN 2% CHG FRAGRANCE RNS FREE READYPREP

## (undated) DEVICE — RESERVOIR,SUCTION,100CC,SILICONE: Brand: MEDLINE

## (undated) DEVICE — Device: Brand: CONFIANZA PRO 12

## (undated) DEVICE — SUTURE VCRL SZ 2-0 L36IN ABSRB UD L36MM CT-1 1/2 CIR J945H

## (undated) DEVICE — BUTTON SWITCH PENCIL BLADE ELECTRODE, HOLSTER: Brand: EDGE

## (undated) DEVICE — GARMENT,MEDLINE,DVT,INT,CALF,LG, GEN2: Brand: MEDLINE

## (undated) DEVICE — SUTURE MCRYL SZ 2-0 L27IN ABSRB UD SH L26MM TAPERPOINT NDL Y417H

## (undated) DEVICE — INFLATION DEVICE: Brand: ENCORE™ 26

## (undated) DEVICE — SYR LR LCK 1ML GRAD NSAF 30ML --

## (undated) DEVICE — ADULT SPO2 SENSOR: Brand: NELLCOR

## (undated) DEVICE — Device: Brand: PORTEX

## (undated) DEVICE — CATHETER DIAG AD 5FR L125CM COR NYL MP AMPLATZ 2 W/ 2 SIDE

## (undated) DEVICE — FORCEPS BX WRK L110MM CHN L2MM DIA1.7MM SUPERDIMENSION

## (undated) DEVICE — (D)PREP SKN CHLRAPRP APPL 26ML -- CONVERT TO ITEM 371833

## (undated) DEVICE — Device

## (undated) DEVICE — SHEET, DRAPE, SPLIT, STERILE: Brand: MEDLINE

## (undated) DEVICE — ABSORBENT, WATERPROOF, BACTERIA PROOF FILM DRESSING: Brand: OPSITE POST OP 9.5X8.5CM CTN 20

## (undated) DEVICE — PAD,ABDOMINAL,5"X9",ST,LF,25/BX: Brand: MEDLINE INDUSTRIES, INC.

## (undated) DEVICE — PATCH SENS PT FOR ELECTROMAGNETIC NAVIGATION BRONCHSCP SYS

## (undated) DEVICE — GUIDEWIRE WITH ICE™ HYDROPHILIC COATING: Brand: CHOICE™ PT

## (undated) DEVICE — SYR 10ML LUER LOK 1/5ML GRAD --

## (undated) DEVICE — Device: Brand: ASAHI SION

## (undated) DEVICE — SINGLE USE SUCTION VALVE MAJ-209: Brand: SINGLE USE SUCTION VALVE (STERILE)

## (undated) DEVICE — 7 DAY SILVER-COATED ANTIMICROBIAL BARRIER DRESSING: Brand: ACTICOAT 7  4" X 5"

## (undated) DEVICE — PTCA DILATATION CATHETER: Brand: EMERGE™ PUSH

## (undated) DEVICE — TFN: Brand: MEDLINE INDUSTRIES, INC.

## (undated) DEVICE — INTRODUCER SHTH 6FR L11CM 0.038IN STD SIDEPRT EXTN 3 W

## (undated) DEVICE — SINGLE BASIN: Brand: CARDINAL HEALTH

## (undated) DEVICE — SUTURE PERMAHAND SZ 0 L30IN NONABSORBABLE BLK L30MM PSL 3/8 590H

## (undated) DEVICE — TUBING, SUCTION, 3/16" X 6', STRAIGHT: Brand: MEDLINE

## (undated) DEVICE — Device: Brand: ASAHI GLADIUS MONGO14

## (undated) DEVICE — SUTURE PROL SZ 7-0 L24IN NONABSORBABLE BLU L9.3MM BV-1 3/8 M8702

## (undated) DEVICE — GLIDESHEATH SLENDER STAINLESS STEEL KIT: Brand: GLIDESHEATH SLENDER

## (undated) DEVICE — SOLUTION IRRIG 1000ML 09% SOD CHL USP PIC PLAS CONTAINER

## (undated) DEVICE — KENDALL RADIOLUCENT FOAM MONITORING ELECTRODE RECTANGULAR SHAPE: Brand: KENDALL

## (undated) DEVICE — PREP SKN CHLRAPRP APL 26ML STR --

## (undated) DEVICE — DRAPE,INSTRUMENT,MAGNETIC,10X16: Brand: MEDLINE

## (undated) DEVICE — INTENDED USED TO PROTECT, TAG AND HELP LOCATED SUTURES DURING SURGERY: Brand: STERION®SUTURE AID BOOTIES

## (undated) DEVICE — EVEROLIMUS-ELUTING PLATINUM CHROMIUM CORONARY STENT SYSTEM
Type: IMPLANTABLE DEVICE | Site: HEART | Status: NON-FUNCTIONAL
Brand: SYNERGY™ XD

## (undated) DEVICE — GOWN,REINF,POLY,ECL,PP SLV,XL: Brand: MEDLINE

## (undated) DEVICE — DRAPE C-ARMOUR C-ARM KIT --

## (undated) DEVICE — MANIFOLD CART ULT HI FLOW W 3 PRT FOR SUCT

## (undated) DEVICE — SUTURE NONABSORBABLE MONOFILAMENT 5-0 C-1 1X24 IN PROLENE 8725H

## (undated) DEVICE — DRAPE TWL SURG 16X26IN BLU ORB04] ALLCARE INC]

## (undated) DEVICE — SKIN MARKER,REGULAR TIP WITH RULER AND LABELS: Brand: DEVON

## (undated) DEVICE — GAUZE,SPONGE,8"X4",12PLY,XRAY,STRL,LF: Brand: MEDLINE

## (undated) DEVICE — CAROTID DR YORK: Brand: MEDLINE INDUSTRIES, INC.

## (undated) DEVICE — SCANLAN® SURG-I-LOOP® SILICONE LOOPS - BLUE SUPER MAXI, 5.0X1.27 MM, 16"/40 CM LONG (2/PKG): Brand: SCANLAN® SURG-I-LOOP® SILICONE LOOPS

## (undated) DEVICE — WIPE INSTR HIGH ABSORBENT FAST WICKING LINT FREE COUNT 20

## (undated) DEVICE — DRAIN ROUND 15FR PERFORATED SURG L49IN DIA3/16IN 10IN H SIL W/O TRCR END PERF

## (undated) DEVICE — ADAPTER BRONCHSCP FOR USE W/ OLY EDGE

## (undated) DEVICE — K-WIRE

## (undated) DEVICE — DRAPE IRRIG FLD WRM W44XL44IN W/ AORN STD PRTBL INTRATEMP

## (undated) DEVICE — SET EXTN 0.4ML L7IN MINIBOR PRSS RATE W/ SLDE CLMP SPIN M

## (undated) DEVICE — AMD ANTIMICROBIAL GAUZE SPONGES 8 PLY USP TYPE VII: Brand: CURITY

## (undated) DEVICE — SINGLE USE BIOPSY VALVE MAJ-210: Brand: SINGLE USE BIOPSY VALVE (STERILE)

## (undated) DEVICE — 1840 FOAM BLOCK NEEDLE COUNTER: Brand: DEVON

## (undated) DEVICE — STERILE LATEX POWDER-FREE SURGICAL GLOVESWITH NITRILE COATING: Brand: PROTEXIS

## (undated) DEVICE — DEVICE COMPR REG 24 CM VASC BND

## (undated) DEVICE — HI-TORQUE WIGGLE GUIDE WIRE .014 STRAIGHT TIP 2.0 CM X 190 CM: Brand: HI-TORQUE WIGGLE

## (undated) DEVICE — SODIUM CHL 0.9% IRRIG.

## (undated) DEVICE — CATH GUID DL ACCS 3FR 135CM -- TWIN-PASS

## (undated) DEVICE — INTENDED FOR TISSUE SEPARATION, AND OTHER PROCEDURES THAT REQUIRE A SHARP SURGICAL BLADE TO PUNCTURE OR CUT.: Brand: BARD-PARKER SAFETY BLADES SIZE 11, STERILE

## (undated) DEVICE — TUBING, SUCTION, 1/4" X 10', STRAIGHT: Brand: MEDLINE

## (undated) DEVICE — SPONGE LAP 18X18IN STRL -- 5/PK